# Patient Record
Sex: MALE | Race: WHITE | NOT HISPANIC OR LATINO
[De-identification: names, ages, dates, MRNs, and addresses within clinical notes are randomized per-mention and may not be internally consistent; named-entity substitution may affect disease eponyms.]

---

## 2017-09-07 PROBLEM — Z00.00 ENCOUNTER FOR PREVENTIVE HEALTH EXAMINATION: Status: ACTIVE | Noted: 2017-09-07

## 2017-09-25 ENCOUNTER — APPOINTMENT (OUTPATIENT)
Dept: VASCULAR SURGERY | Facility: CLINIC | Age: 57
End: 2017-09-25
Payer: MEDICAID

## 2017-09-25 VITALS
HEIGHT: 68 IN | DIASTOLIC BLOOD PRESSURE: 94 MMHG | HEART RATE: 106 BPM | BODY MASS INDEX: 43.19 KG/M2 | TEMPERATURE: 98.4 F | SYSTOLIC BLOOD PRESSURE: 143 MMHG | WEIGHT: 285 LBS

## 2017-09-25 DIAGNOSIS — I83.90 ASYMPTOMATIC VARICOSE VEINS OF UNSPECIFIED LOWER EXTREMITY: ICD-10-CM

## 2017-09-25 DIAGNOSIS — M25.472 EFFUSION, RIGHT ANKLE: ICD-10-CM

## 2017-09-25 DIAGNOSIS — Z86.39 PERSONAL HISTORY OF OTHER ENDOCRINE, NUTRITIONAL AND METABOLIC DISEASE: ICD-10-CM

## 2017-09-25 DIAGNOSIS — F17.200 NICOTINE DEPENDENCE, UNSPECIFIED, UNCOMPLICATED: ICD-10-CM

## 2017-09-25 DIAGNOSIS — E03.9 HYPOTHYROIDISM, UNSPECIFIED: ICD-10-CM

## 2017-09-25 DIAGNOSIS — L03.116 CELLULITIS OF LEFT LOWER LIMB: ICD-10-CM

## 2017-09-25 DIAGNOSIS — M25.471 EFFUSION, RIGHT ANKLE: ICD-10-CM

## 2017-09-25 PROCEDURE — 99204 OFFICE O/P NEW MOD 45 MIN: CPT | Mod: 25

## 2017-09-25 RX ORDER — LEVOTHYROXINE SODIUM 137 UG/1
TABLET ORAL
Refills: 0 | Status: ACTIVE | COMMUNITY

## 2017-09-25 RX ORDER — DIVALPROEX SODIUM 500 MG/1
500 TABLET, DELAYED RELEASE ORAL
Refills: 0 | Status: ACTIVE | COMMUNITY

## 2017-09-25 RX ORDER — SIMVASTATIN 40 MG/1
40 TABLET, FILM COATED ORAL
Refills: 0 | Status: ACTIVE | COMMUNITY

## 2017-09-25 RX ORDER — OMEPRAZOLE 40 MG/1
40 CAPSULE, DELAYED RELEASE ORAL
Refills: 0 | Status: ACTIVE | COMMUNITY

## 2017-09-25 RX ORDER — CALCIUM CARBONATE/VITAMIN D3 600 MG-10
TABLET ORAL
Refills: 0 | Status: ACTIVE | COMMUNITY

## 2017-09-25 RX ORDER — RISPERIDONE 2 MG/1
2 TABLET ORAL
Refills: 0 | Status: ACTIVE | COMMUNITY

## 2017-09-25 RX ORDER — CEPHALEXIN 500 MG/1
500 CAPSULE ORAL
Qty: 20 | Refills: 0 | Status: ACTIVE | COMMUNITY
Start: 2017-09-25 | End: 1900-01-01

## 2017-09-25 RX ORDER — BUSPIRONE HYDROCHLORIDE 10 MG/1
10 TABLET ORAL
Refills: 0 | Status: ACTIVE | COMMUNITY

## 2017-09-25 RX ORDER — FOLIC ACID 1 MG/1
1 TABLET ORAL
Refills: 0 | Status: ACTIVE | COMMUNITY

## 2017-10-23 ENCOUNTER — APPOINTMENT (OUTPATIENT)
Dept: VASCULAR SURGERY | Facility: CLINIC | Age: 57
End: 2017-10-23

## 2019-05-27 ENCOUNTER — INPATIENT (INPATIENT)
Facility: HOSPITAL | Age: 59
LOS: 1 days | Discharge: HOME | End: 2019-05-29
Attending: HOSPITALIST | Admitting: HOSPITALIST
Payer: MEDICAID

## 2019-05-27 VITALS
HEART RATE: 85 BPM | RESPIRATION RATE: 18 BRPM | DIASTOLIC BLOOD PRESSURE: 56 MMHG | OXYGEN SATURATION: 97 % | SYSTOLIC BLOOD PRESSURE: 114 MMHG | TEMPERATURE: 99 F

## 2019-05-27 DIAGNOSIS — F20.9 SCHIZOPHRENIA, UNSPECIFIED: ICD-10-CM

## 2019-05-27 DIAGNOSIS — R41.0 DISORIENTATION, UNSPECIFIED: ICD-10-CM

## 2019-05-27 DIAGNOSIS — N18.9 CHRONIC KIDNEY DISEASE, UNSPECIFIED: ICD-10-CM

## 2019-05-27 DIAGNOSIS — R09.89 OTHER SPECIFIED SYMPTOMS AND SIGNS INVOLVING THE CIRCULATORY AND RESPIRATORY SYSTEMS: ICD-10-CM

## 2019-05-27 DIAGNOSIS — E03.9 HYPOTHYROIDISM, UNSPECIFIED: ICD-10-CM

## 2019-05-27 DIAGNOSIS — R60.0 LOCALIZED EDEMA: ICD-10-CM

## 2019-05-27 DIAGNOSIS — F05 DELIRIUM DUE TO KNOWN PHYSIOLOGICAL CONDITION: ICD-10-CM

## 2019-05-27 DIAGNOSIS — N40.0 BENIGN PROSTATIC HYPERPLASIA WITHOUT LOWER URINARY TRACT SYMPTOMS: ICD-10-CM

## 2019-05-27 DIAGNOSIS — Z29.9 ENCOUNTER FOR PROPHYLACTIC MEASURES, UNSPECIFIED: ICD-10-CM

## 2019-05-27 DIAGNOSIS — D64.9 ANEMIA, UNSPECIFIED: ICD-10-CM

## 2019-05-27 LAB
ALBUMIN SERPL ELPH-MCNC: 3.3 G/DL — SIGNIFICANT CHANGE UP (ref 3.3–5)
ALP SERPL-CCNC: 104 U/L — SIGNIFICANT CHANGE UP (ref 40–120)
ALT FLD-CCNC: 7 U/L — SIGNIFICANT CHANGE UP (ref 4–41)
AMPHET UR-MCNC: NEGATIVE — SIGNIFICANT CHANGE UP
ANION GAP SERPL CALC-SCNC: 14 MMO/L — SIGNIFICANT CHANGE UP (ref 7–14)
APAP SERPL-MCNC: < 15 UG/ML — LOW (ref 15–25)
APPEARANCE UR: CLEAR — SIGNIFICANT CHANGE UP
AST SERPL-CCNC: 10 U/L — SIGNIFICANT CHANGE UP (ref 4–40)
BACTERIA # UR AUTO: SIGNIFICANT CHANGE UP
BARBITURATES UR SCN-MCNC: NEGATIVE — SIGNIFICANT CHANGE UP
BASOPHILS # BLD AUTO: 0.08 K/UL — SIGNIFICANT CHANGE UP (ref 0–0.2)
BASOPHILS NFR BLD AUTO: 0.9 % — SIGNIFICANT CHANGE UP (ref 0–2)
BENZODIAZ UR-MCNC: NEGATIVE — SIGNIFICANT CHANGE UP
BILIRUB SERPL-MCNC: < 0.2 MG/DL — LOW (ref 0.2–1.2)
BILIRUB UR-MCNC: NEGATIVE — SIGNIFICANT CHANGE UP
BLOOD UR QL VISUAL: NEGATIVE — SIGNIFICANT CHANGE UP
BUN SERPL-MCNC: 29 MG/DL — HIGH (ref 7–23)
CALCIUM SERPL-MCNC: 8.7 MG/DL — SIGNIFICANT CHANGE UP (ref 8.4–10.5)
CANNABINOIDS UR-MCNC: NEGATIVE — SIGNIFICANT CHANGE UP
CHLORIDE SERPL-SCNC: 107 MMOL/L — SIGNIFICANT CHANGE UP (ref 98–107)
CHLORIDE UR-SCNC: 53 MMOL/L — SIGNIFICANT CHANGE UP
CO2 SERPL-SCNC: 20 MMOL/L — LOW (ref 22–31)
COCAINE METAB.OTHER UR-MCNC: NEGATIVE — SIGNIFICANT CHANGE UP
COLOR SPEC: COLORLESS — SIGNIFICANT CHANGE UP
CREAT ?TM UR-MCNC: 24.6 MG/DL — SIGNIFICANT CHANGE UP
CREAT SERPL-MCNC: 3.26 MG/DL — HIGH (ref 0.5–1.3)
EOSINOPHIL # BLD AUTO: 0.33 K/UL — SIGNIFICANT CHANGE UP (ref 0–0.5)
EOSINOPHIL NFR BLD AUTO: 3.8 % — SIGNIFICANT CHANGE UP (ref 0–6)
ETHANOL BLD-MCNC: < 10 MG/DL — SIGNIFICANT CHANGE UP
GLUCOSE SERPL-MCNC: 102 MG/DL — HIGH (ref 70–99)
GLUCOSE UR-MCNC: NEGATIVE — SIGNIFICANT CHANGE UP
HCT VFR BLD CALC: 27.9 % — LOW (ref 39–50)
HGB BLD-MCNC: 8.9 G/DL — LOW (ref 13–17)
HYALINE CASTS # UR AUTO: NEGATIVE — SIGNIFICANT CHANGE UP
IMM GRANULOCYTES NFR BLD AUTO: 0.5 % — SIGNIFICANT CHANGE UP (ref 0–1.5)
KETONES UR-MCNC: NEGATIVE — SIGNIFICANT CHANGE UP
LEUKOCYTE ESTERASE UR-ACNC: SIGNIFICANT CHANGE UP
LYMPHOCYTES # BLD AUTO: 1.41 K/UL — SIGNIFICANT CHANGE UP (ref 1–3.3)
LYMPHOCYTES # BLD AUTO: 16.1 % — SIGNIFICANT CHANGE UP (ref 13–44)
MCHC RBC-ENTMCNC: 30.7 PG — SIGNIFICANT CHANGE UP (ref 27–34)
MCHC RBC-ENTMCNC: 31.9 % — LOW (ref 32–36)
MCV RBC AUTO: 96.2 FL — SIGNIFICANT CHANGE UP (ref 80–100)
METHADONE UR-MCNC: NEGATIVE — SIGNIFICANT CHANGE UP
MONOCYTES # BLD AUTO: 0.62 K/UL — SIGNIFICANT CHANGE UP (ref 0–0.9)
MONOCYTES NFR BLD AUTO: 7.1 % — SIGNIFICANT CHANGE UP (ref 2–14)
NEUTROPHILS # BLD AUTO: 6.29 K/UL — SIGNIFICANT CHANGE UP (ref 1.8–7.4)
NEUTROPHILS NFR BLD AUTO: 71.6 % — SIGNIFICANT CHANGE UP (ref 43–77)
NITRITE UR-MCNC: NEGATIVE — SIGNIFICANT CHANGE UP
NRBC # FLD: 0 K/UL — SIGNIFICANT CHANGE UP (ref 0–0)
NT-PROBNP SERPL-SCNC: 4364 PG/ML — SIGNIFICANT CHANGE UP
OPIATES UR-MCNC: NEGATIVE — SIGNIFICANT CHANGE UP
OXYCODONE UR-MCNC: NEGATIVE — SIGNIFICANT CHANGE UP
PCP UR-MCNC: NEGATIVE — SIGNIFICANT CHANGE UP
PH UR: 6.5 — SIGNIFICANT CHANGE UP (ref 5–8)
PLATELET # BLD AUTO: 383 K/UL — SIGNIFICANT CHANGE UP (ref 150–400)
PMV BLD: 9 FL — SIGNIFICANT CHANGE UP (ref 7–13)
POTASSIUM SERPL-MCNC: 4.1 MMOL/L — SIGNIFICANT CHANGE UP (ref 3.5–5.3)
POTASSIUM SERPL-SCNC: 4.1 MMOL/L — SIGNIFICANT CHANGE UP (ref 3.5–5.3)
POTASSIUM UR-SCNC: 9.5 MMOL/L — SIGNIFICANT CHANGE UP
PROT SERPL-MCNC: 6.6 G/DL — SIGNIFICANT CHANGE UP (ref 6–8.3)
PROT UR-MCNC: NEGATIVE — SIGNIFICANT CHANGE UP
RBC # BLD: 2.9 M/UL — LOW (ref 4.2–5.8)
RBC # FLD: 14 % — SIGNIFICANT CHANGE UP (ref 10.3–14.5)
RBC CASTS # UR COMP ASSIST: SIGNIFICANT CHANGE UP (ref 0–?)
SALICYLATES SERPL-MCNC: < 5 MG/DL — LOW (ref 15–30)
SODIUM SERPL-SCNC: 141 MMOL/L — SIGNIFICANT CHANGE UP (ref 135–145)
SODIUM UR-SCNC: 60 MMOL/L — SIGNIFICANT CHANGE UP
SP GR SPEC: 1.01 — SIGNIFICANT CHANGE UP (ref 1–1.04)
SQUAMOUS # UR AUTO: SIGNIFICANT CHANGE UP
TSH SERPL-MCNC: 8.77 UIU/ML — HIGH (ref 0.27–4.2)
UROBILINOGEN FLD QL: NORMAL — SIGNIFICANT CHANGE UP
VALPROATE SERPL-MCNC: 17.7 UG/ML — LOW (ref 50–100)
WBC # BLD: 8.77 K/UL — SIGNIFICANT CHANGE UP (ref 3.8–10.5)
WBC # FLD AUTO: 8.77 K/UL — SIGNIFICANT CHANGE UP (ref 3.8–10.5)
WBC UR QL: HIGH (ref 0–?)

## 2019-05-27 PROCEDURE — 90792 PSYCH DIAG EVAL W/MED SRVCS: CPT | Mod: GC

## 2019-05-27 PROCEDURE — 99223 1ST HOSP IP/OBS HIGH 75: CPT | Mod: GC

## 2019-05-27 RX ORDER — RISPERIDONE 4 MG/1
2 TABLET ORAL AT BEDTIME
Refills: 0 | Status: DISCONTINUED | OUTPATIENT
Start: 2019-05-27 | End: 2019-05-29

## 2019-05-27 RX ORDER — SIMVASTATIN 20 MG/1
40 TABLET, FILM COATED ORAL AT BEDTIME
Refills: 0 | Status: DISCONTINUED | OUTPATIENT
Start: 2019-05-27 | End: 2019-05-29

## 2019-05-27 RX ORDER — TRAZODONE HCL 50 MG
50 TABLET ORAL AT BEDTIME
Refills: 0 | Status: DISCONTINUED | OUTPATIENT
Start: 2019-05-27 | End: 2019-05-29

## 2019-05-27 RX ORDER — DIVALPROEX SODIUM 500 MG/1
250 TABLET, DELAYED RELEASE ORAL AT BEDTIME
Refills: 0 | Status: DISCONTINUED | OUTPATIENT
Start: 2019-05-27 | End: 2019-05-28

## 2019-05-27 RX ORDER — HEPARIN SODIUM 5000 [USP'U]/ML
5000 INJECTION INTRAVENOUS; SUBCUTANEOUS EVERY 8 HOURS
Refills: 0 | Status: DISCONTINUED | OUTPATIENT
Start: 2019-05-27 | End: 2019-05-29

## 2019-05-27 RX ORDER — TAMSULOSIN HYDROCHLORIDE 0.4 MG/1
0.4 CAPSULE ORAL AT BEDTIME
Refills: 0 | Status: DISCONTINUED | OUTPATIENT
Start: 2019-05-27 | End: 2019-05-29

## 2019-05-27 RX ORDER — LEVOTHYROXINE SODIUM 125 MCG
88 TABLET ORAL DAILY
Refills: 0 | Status: DISCONTINUED | OUTPATIENT
Start: 2019-05-27 | End: 2019-05-29

## 2019-05-27 RX ADMIN — HEPARIN SODIUM 5000 UNIT(S): 5000 INJECTION INTRAVENOUS; SUBCUTANEOUS at 21:37

## 2019-05-27 RX ADMIN — DIVALPROEX SODIUM 250 MILLIGRAM(S): 500 TABLET, DELAYED RELEASE ORAL at 21:37

## 2019-05-27 RX ADMIN — Medication 50 MILLIGRAM(S): at 21:37

## 2019-05-27 RX ADMIN — TAMSULOSIN HYDROCHLORIDE 0.4 MILLIGRAM(S): 0.4 CAPSULE ORAL at 21:37

## 2019-05-27 RX ADMIN — SIMVASTATIN 40 MILLIGRAM(S): 20 TABLET, FILM COATED ORAL at 21:37

## 2019-05-27 RX ADMIN — RISPERIDONE 2 MILLIGRAM(S): 4 TABLET ORAL at 21:37

## 2019-05-27 NOTE — H&P ADULT - PROBLEM SELECTOR PLAN 1
Psych hx: schizophrenia/schizoaffective disorder (30+ hospitalization, last 2018 at [place]), brought in by brother for concern of functional decline in setting of medication non-adherence. Hx 4 suicide attempts (last 20 yrs ago).  - Appreciate psych recs  - Valproic acid level subtherapeutic at 17.7  - C/w home meds including: divalproex sodium 1000 daily, resperidone 2 mg daily  buspirone 10 mg BID, bupropion 75 mg BID, trazodone 50 mg daily for now Psych hx: schizophrenia/schizoaffective disorder (30+ hospitalization, last 2018 at [place]), brought in by brother for concern of functional decline in setting of medication non-adherence. Hx 4 suicide attempts (last 20 yrs ago).  - psych consult reviewed  - Valproic acid level subtherapeutic at 17.7  - C/w home meds including: divalproex sodium 1000 daily, resperidone 2 mg daily  buspirone 10 mg BID, bupropion 75 mg BID, trazodone 50 mg daily for now

## 2019-05-27 NOTE — H&P ADULT - PROBLEM SELECTOR PLAN 3
Scr noted at 3.26 in ED, likely chronic. No baseline Cr in [facility] however Scr 3.89 1 week PTA per [facility] record supplied by brother  Pt and brother endorse long history of "kidney problem"  - FeNa 5.8 suggestive of post-renal/obstructive etiology  - F/u kidney ultrasound   - C/w tamsulosin as below Scr noted at 3.26 in ED, likely chronic. No baseline Cr in [facility] however Scr 3.89 1 week PTA per [facility] record supplied by brother  Pt and brother endorse long history of "kidney problem"  - FeNa 5.8 suggestive of post-renal/obstructive etiology  - F/u kidney ultrasound   - C/w tamsulosin as below

## 2019-05-27 NOTE — ED PROVIDER NOTE - SKIN COLOR
B/L LE lymphadema appearance, lichen planus, stasus dermatitis, nontender erytheam along midline thighs

## 2019-05-27 NOTE — H&P ADULT - PROBLEM SELECTOR PLAN 2
Normocytic anemia. Hgb 8.9 in ED.   Likely chronic 2/2 renal disease. No b/l in [facility].  Hgb 9.4 1 week PTA per [place] record supplied by brother. Per pt EGD at [facility] last week negative for bleed "totally normal".  Pt denies hematuria or BRBPR.    - F/u reticulocyte count/index  - F/u iron studies   - F/u B12/thiamine Normocytic anemia. Hgb 8.9 in ED.   Likely chronic 2/2 renal disease. No b/l in [facility].  Hgb 9.4 1 week PTA per [facility] record supplied by brother. Per pt EGD at [facility] last week negative for bleed "totally normal".  Pt denies hematuria or BRBPR.    - F/u reticulocyte count/index  - F/u iron studies   - F/u B12/thiamine

## 2019-05-27 NOTE — H&P ADULT - PROBLEM SELECTOR PLAN 5
C/w tamsulosin 0.4 mg daily TSH elevated at 8.7 (possibly in setting poor medication adherence)   Will f/u T4 in AM  C/w home levothyroxine 88 mcg for now

## 2019-05-27 NOTE — ED BEHAVIORAL HEALTH ASSESSMENT NOTE - OTHER
cooperative, but appears to be minimizing, unclear if this is intent or his actual beliefs lying in bed Germán alavrado. brother

## 2019-05-27 NOTE — H&P ADULT - HISTORY OF PRESENT ILLNESS
60 yo man w/ ?vascualr disease, psych hx: schizophrenia/schizoaffective disorder (30+ hospitalization, last 2018 at [place]), brought in by brother for concern of functional decline in setting of medication non-adherence. Pt lives alone, disabled by schizophrenia, HHA 4d/wk, visiting nurse 2x/mo, hx of 4 suicide attempts (last 20 yrs ago).  Pt's brother concerned of functional decline, states pt not adhering to medication, apartment noted with lit cigarettes on the floor, pt not showering/ tending to hygiene, reduced sleep, increased anxiety and conceptual disorganization. 60 yo man w/ ?vascualr disease, psych hx: schizophrenia/schizoaffective disorder (30+ hospitalization, last 2018 at [place]), brought in by brother for concern of functional decline in setting of medication non-adherence. Pt lives alone, disabled by schizophrenia, HHA 4d/wk, visiting nurse 2x/mo, hx of 4 suicide attempts (last 20 yrs ago).  Pt's brother concerned of functional decline, states pt not adhering to medication, apartment noted with lit cigarettes on the floor, pt not showering/ tending to hygiene, reduced sleep, increased anxiety and conceptual disorganization.     ED VS, afebrile, HR 70s-80s, -114/56-79, O2sat 100% on RA  No medications administered in ED 58 yo man w/ ?vascualr disease, psych hx: schizophrenia/schizoaffective disorder (30+ hospitalization), brought in by brother for concern of functional decline in setting of medication non-adherence. Pt lives alone, disabled by schizophrenia, HHA 4d/wk, visiting nurse 2x/mo, hx of 4 suicide attempts (last 20 yrs ago).  Pt's brother concerned of functional decline, states pt not adhering to medication, apartment noted with lit cigarettes on the floor, pt not showering/ tending to hygiene, reduced sleep, increased anxiety and conceptual disorganization. Pt and brother endorse long history of kidney disfunction (Cr 3.89 at [facility] 1 week PTA). Pt states he had an EGD at [facility] last week that was unremarkable.  He states that he was found to have clots in his lower extremities last week but was not sent home on blood thinners.  Pt denies light headedness, recent fall, chest pain, SOB, abd pain, N/V/D.     ED VS, afebrile, HR 70s-80s, -114/56-79, O2sat 100% on RA  Labs notable for Scr 3.26, Hgb 9.4, valproic acid level subtherapeutic at 17.7, TSH elevated at 8.7  No medications administered in ED

## 2019-05-27 NOTE — H&P ADULT - NSHPPHYSICALEXAM_GEN_ALL_CORE
Vital Signs Last 24 Hrs  T(C): 36.7 (27 May 2019 16:45), Max: 37.1 (27 May 2019 13:09)  T(F): 98 (27 May 2019 16:45), Max: 98.7 (27 May 2019 13:09)  HR: 74 (27 May 2019 16:45) (74 - 85)  BP: 104/61 (27 May 2019 16:45) (104/61 - 134/79)  RR: 18 (27 May 2019 16:45) (18 - 18)  SpO2: 96% (27 May 2019 16:45) (96% - 100%)    GENERAL: NAD, sitting up in stretcher  HEENT: PERRL, EOMI, MMM, no oropharyngeal lesions or erythema appreciated  Pulm: normal work of breathing, CTAB  CV: RRR, S1&S2+, no m/r/g appreciated  ABDOMEN: +BS, soft, obese, slight distention, nt  MSK: nl ROM  EXTREMITIES: non-pitting AURELIA with erythematous/brown pigmented scaly lichenified plaques involving the distal lower leg. scattered woody induration w/ apearance of lipodermatosclerosis.  unable to palpate DP b/l, warm lower extremities.  palpable pulses b/l UE.    Neuro: A&Ox3, no focal deficits  SKIN: warm and dry, rash as above

## 2019-05-27 NOTE — ED BEHAVIORAL HEALTH ASSESSMENT NOTE - DESCRIPTION
DVT? in past, hx of vascular disease, Kidney disease? lives alone, never , no dependents Calm and cooperative in ED, seen on medical side, patient minimizing, unreliable historian.     Vital Signs Last 24 Hrs  T(C): 36.7 (27 May 2019 16:45), Max: 37.1 (27 May 2019 13:09)  T(F): 98 (27 May 2019 16:45), Max: 98.7 (27 May 2019 13:09)  HR: 74 (27 May 2019 16:45) (74 - 85)  BP: 104/61 (27 May 2019 16:45) (104/61 - 134/79)  BP(mean): --  RR: 18 (27 May 2019 16:45) (18 - 18)  SpO2: 96% (27 May 2019 16:45) (96% - 100%)

## 2019-05-27 NOTE — ED ADULT TRIAGE NOTE - CHIEF COMPLAINT QUOTE
Pt unable to care for self.  Disoriented.  Non compliant with meds.  PMH bipolar disorder, schizophrenia.  Pt states he forgets to take his meds.  A+OX3.  Denies any recent illnesses.

## 2019-05-27 NOTE — H&P ADULT - PROBLEM SELECTOR PLAN 4
TSH elevated at 8.7 (possibly in setting poor medication adherence)   Will f/u T4 in AM  C/w home levothyroxine 88 mcg for now Tense non-pitting lower extremity edema and rash suggestive of statis dermatitis.  Pt notes hx of lower extremity clot last week at [facility] but states he was not sent home on anticoagulation. Denies hx of PE. No SOB.   - F/u lower extremity dopplers to r/o DVT

## 2019-05-27 NOTE — ED PROVIDER NOTE - ATTENDING CONTRIBUTION TO CARE
Dr. Cottrell: I have personally seen and examined this patient at the bedside. I have fully participated in the care of this patient. I have reviewed all pertinent clinical information, including history, physical exam, plan and the Resident's note and agree except as noted. HPI above as by me. PE above as by me. Disorientation setting of medication non compliance no si/hi no harm to self or others PLAN ekg, labs, psych eval, suspect patient requires higher level of care than assisted living.

## 2019-05-27 NOTE — ED BEHAVIORAL HEALTH ASSESSMENT NOTE - DETAILS
hx of 4 SA, 3 ODs and 1 cutting wrists, all over 20 years ago. was toxic on Lithium in remote past, unclear details. Mother with "manic depressive illness" dementia nausea, vomiting signed out on [facility] care is coordinated with brother and EM provider

## 2019-05-27 NOTE — ED ADULT NURSE NOTE - OBJECTIVE STATEMENT
PT received into room 2 A&Ox3, ambulatory with assistance brought in by family for increased confusion, increased difficulties with ADL's and non- compliance with medications x 2 weeks. Pt C/O dizziness and nausea x 1 day. PT has PMH: Schizophrenia, Bipolar, family states PT has not been taking medications for approximately 2 weeks, assisted living apt was in disarray, has had episodes of confusion previously and usually associated to non- compliance with meds. PT currently A&Ox3, has some inconsistency with HPI; Pt has B/L swollen extremities, B/L LE redness; dry cracked skin secondary to Lymphedema and venous stasis, has redness and moisture associated excoriation to lower ABD. PT calm and cooperative on assessment. Denies SI/HI, AH/VH, ETOH/ drug use. MD evaluated, VS as noted. 22 G IV placed to R ARM, labs sent. Family at bedside, call bell within reach, will continue to monitor for safety and comfort.

## 2019-05-27 NOTE — ED PROVIDER NOTE - OBJECTIVE STATEMENT
14:13 Simba att: 59M h/o bipolar disorder and schizophrenia brought in by brother for psych eval. Patient reports he is here for nausea, confusion and dizzines. Brother reports in private patient lives in [facility], when he takes his meds he is good. Early this month 9 day medical admission [place], no psych issues. Past 2 weeks not taking depakote, risperidal, buspirone, trazodone and now he stays up all night, house in disarray, and gradually not answering questions. Brother took him 3 times to [facility] to get him psychiatrically admitted, came to [facility] today at a 's recommendation. While walking her patient told his brother he felt lightheaded. 14:13 Simba att: 59M h/o bipolar disorder and schizophrenia brought in by brother for psych eval. Patient reports he is here for nausea, confusion and dizzines. Brother reports in private patient lives in [facility], when he takes his meds he is good. Early this month 9 day medical admission [place], no psych issues. Past 2 weeks not taking Depakote, Risperdal, buspirone, trazodone and now he stays up all night, house in disarray, and gradually not answering questions. Brother took him 3 times to [facility] to get him psychiatrically admitted, came to [facility] today at a 's recommendation. While walking her patient told his brother he felt lightheaded.

## 2019-05-27 NOTE — ED BEHAVIORAL HEALTH ASSESSMENT NOTE - CASE SUMMARY
58yo M, single, living alone, disabled for schizophrenia, psych hx of schizophrenia/schizoaffective disorder, hx of over 30 hospitalizations, most recently in 2018 at [place], hx of 4 SA, 3 by OD, cut wrists once, all over 20 years ago, lives alone with assistance from [employer] has HHA 4 days/week, visiting nurse 2x/month, medical hx of vascular disease, hx of likely DVT remote, was recently medically admitted to [facility] for 9 days earlier this month for "medical reasons," went to [place] 3x this week was seen in ED and discharged each time, while patient is minimizing of all symptoms, brother Germán, present at bedside notes patient has been med non-compliant as he counted his meds, notes he has been going for walks around town at 3-4am at night, his apartment is in disarray, with lit cigarettes on the floor, patient is not showering or tending to hygiene, reduced sleep, increased anxiety and conceptual disorganization. Patient with apparent abrupt functional decline and waxing and waning sensorium, delirium vs recurrent psychosis 2/2 med non-compliance, current metabolic derangements include anemia and elevated creatinine and BUN, unclear chronicity. Patient to be medically admitted and [facility] psychiatry to follow.

## 2019-05-27 NOTE — ED PROVIDER NOTE - CLINICAL SUMMARY MEDICAL DECISION MAKING FREE TEXT BOX
Disorientation setting of medication non compliance no si/hi no harm to self or others PLAN ekg, labs, psych eval, suspect patient requires higher level of care than assisted living

## 2019-05-27 NOTE — ED BEHAVIORAL HEALTH ASSESSMENT NOTE - SUMMARY
58yo M, single, living alone, disabled for schizophrenia, psych hx of schizophrenia/schizoaffective disorder, hx of over 30 hospitalizations, most recently in 2018 at [place], hx of 4 SA, 3 by OD, cut wrists once, all over 20 years ago, lives alone with assistance from [employer] has HHA 4 days/week, visiting nurse 2x/month, medical hx of vascular disease, hx of likely DVT remote, was recently medically admitted to [facility] for 9 days earlier this month for "medical reasons," went to [place] 3x this week was seen in ED and discharged each time, while patient is minimizing of all symptoms, brother Germán, present at bedside notes patient has been med non-compliant as he counted his meds, notes he has been going for walks around town at 3-4am at night, his apartment is in disarray, with lit cigarettes on the floor, patient is not showering or tending to hygiene, reduced sleep, increased anxiety and conceptual disorganization. Patient with apparent abrupt functional decline and waxing and waning sensorium, delirium vs recurrent psychosis 2/2 med non-compliance, current metabolic derangements include anemia and elevated creatinine and BUN, unclear chronicity. Family thinks patient is not safe to go home.

## 2019-05-27 NOTE — H&P ADULT - NSHPREVIEWOFSYSTEMS_GEN_ALL_CORE
REVIEW OF SYSTEMS:    CONSTITUTIONAL: No weakness, fevers or chills  EYES/ENT: No visual changes;  no throat pain   NECK: No pain or stiffness  RESPIRATORY: No cough, wheezing, hemoptysis; No shortness of breath  CARDIOVASCULAR: No chest pain or palpitations  GASTROINTESTINAL: No abdominal or epigastric pain. No nausea, vomiting, or hematemesis; No diarrhea or constipation. No melena or hematochezia.  GENITOURINARY: No dysuria, change in frequency or hematuria  NEUROLOGICAL: +headache, No numbness or weakness  SKIN: No itching, burning, rashes, or lesions   All other review of systems is negative unless indicated above.

## 2019-05-27 NOTE — ED ADULT NURSE NOTE - NSIMPLEMENTINTERV_GEN_ALL_ED
Implemented All Fall Risk Interventions:  [place] to call system. Call bell, personal items and telephone within reach. Instruct patient to call for assistance. Room bathroom lighting operational. Non-slip footwear when patient is off stretcher. Physically safe environment: no spills, clutter or unnecessary equipment. Stretcher in lowest position, wheels locked, appropriate side rails in place. Provide visual cue, wrist band, yellow gown, etc. Monitor gait and stability. Monitor for mental status changes and reorient to person, place, and time. Review medications for side effects contributing to fall risk. Reinforce activity limits and safety measures with patient and family.

## 2019-05-27 NOTE — ED PROVIDER NOTE - NEUROLOGICAL, MLM
Alert and oriented, no focal deficits, no motor or sensory deficits. CN 2-12 intact, neg pronator drift, 5/5 str throughout, sensation intact throughout, clear speech. Finger to nose intact. Nl rapid alt mvmt.

## 2019-05-27 NOTE — ED BEHAVIORAL HEALTH ASSESSMENT NOTE - HPI (INCLUDE ILLNESS QUALITY, SEVERITY, DURATION, TIMING, CONTEXT, MODIFYING FACTORS, ASSOCIATED SIGNS AND SYMPTOMS)
60yo M, single, living alone, disabled for schizophrenia, psych hx of schizophrenia/schizoaffective disorder, hx of over 30 hospitalizations, most recently in 2018 at [place], hx of 4 SA, 3 by OD, cut wrists once, all over 20 years ago, lives alone with assistance from [employer] has HHA 4 days/week, visiting nurse 2x/month, medical hx of vascular disease, hx of likely DVT remote, was recently medically admitted to [facility] for 9 days earlier this month for "medical reasons," went to [place] 3x this week was seen in ED and discharged each time, while patient is minimizing of all symptoms, brother Germán, present at bedside notes patient has been med non-compliant as he counted his meds, notes he has been going for walks around town at 3-4am at night, his apartment is in disarray, with lit cigarettes on the floor, patient is not showering or tending to hygiene, reduced sleep, increased anxiety and conceptual disorganization. Denies that brother has expressed any SI or behaviors concerning for suicidal tendencies, denies apparent depressed mood. Family is unable to account for chronicity of kidney disease, or anemia. Brother expresses concerns that patient is not appropriate for independent living as he has concerns that cigarettes on floor could lead to fire and that patient is unable to feed self or take appropriate care of himself in current set up. Notes that they have initiated process to obtain placement in assisted living facility with assistance from a SW outside, but she felt that current condition warrants higher level of care until patient is stabilized or higher level of care can be obtained.

## 2019-05-27 NOTE — ED PROVIDER NOTE - PROGRESS NOTE DETAILS
labwork from [facility] on 5/24 with hb 9.4, Cr 3.89  will adm it to hospitalist for psych admission to [facility] and mikael workup of anemia and elevated cr

## 2019-05-27 NOTE — H&P ADULT - ATTENDING COMMENTS
Pt with history of schizophrenia brought to ED by brother 2/2 poor self care.  Pt reports recent admission to [place] where he was treated for blood clots in the legs, but states he had to walk 1 mile home from the hospital at 4 AM.  Reports he took a sleeping pill, and slept for 2 days and therefore missed his medications. Denies AVH, or SI.    On exam: pt in NAD.  Lungs CTAB.  Abd s/nt/nd normal bs.  BLE with marked edema and hyperpigmentation.    Labs and imaging reviewed  1) schizophrenia  - continue home medications  - f/u C/L psych  2) anemia  - likely chronic, no signs of bleeding  3) CKD: Cr improved from level from recent admission provided by brother

## 2019-05-27 NOTE — H&P ADULT - NSHPLABSRESULTS_GEN_ALL_CORE
8.9    8.77  )-----------( 383      ( 27 May 2019 14:20 )             27.9           141  |  107  |  29<H>  ----------------------------<  102<H>  4.1   |  20<L>  |  3.26<H>    Ca    8.7      27 May 2019 14:20    TPro  6.6  /  Alb  3.3  /  TBili  < 0.2<L>  /  DBili  x   /  AST  10  /  ALT  7   /  AlkPhos  104        Urinalysis Basic - ( 27 May 2019 18:35 )    Color: COLORLESS / Appearance: CLEAR / S.006 / pH: 6.5  Gluc: NEGATIVE / Ketone: NEGATIVE  / Bili: NEGATIVE / Urobili: NORMAL   Blood: NEGATIVE / Protein: NEGATIVE / Nitrite: NEGATIVE   Leuk Esterase: MODERATE / RBC: 0-2 / WBC 11-25   Sq Epi: OCC / Non Sq Epi: x / Bacteria: FEW      EKG:   NSR at 74 w/ bifascicular block (RBBB and LAHB) 8.9    8.77  )-----------( 383      ( 27 May 2019 14:20 )             27.9           141  |  107  |  29<H>  ----------------------------<  102<H>  4.1   |  20<L>  |  3.26<H>    Ca    8.7      27 May 2019 14:20    TPro  6.6  /  Alb  3.3  /  TBili  < 0.2<L>  /  DBili  x   /  AST  10  /  ALT  7   /  AlkPhos  104        Urinalysis Basic - ( 27 May 2019 18:35 )    Color: COLORLESS / Appearance: CLEAR / S.006 / pH: 6.5  Gluc: NEGATIVE / Ketone: NEGATIVE  / Bili: NEGATIVE / Urobili: NORMAL   Blood: NEGATIVE / Protein: NEGATIVE / Nitrite: NEGATIVE   Leuk Esterase: MODERATE / RBC: 0-2 / WBC 11-25   Sq Epi: OCC / Non Sq Epi: x / Bacteria: FEW    UTox negative  Acetaminophen level <15  Salicylate level <5  Ethanol level < 10  Valproic Acid level 17.7 (low)       EKG:   NSR at 74 w/ bifascicular block (RBBB and LAHB)

## 2019-05-27 NOTE — ED BEHAVIORAL HEALTH ASSESSMENT NOTE - RISK ASSESSMENT
Patient poses low acute suicide or aggression risk, but has demonstrated an inability to care for self in recent weeks, evidenced by putting out cigarettes on floor, not able to self administer meds consistently, unable to make meals adequately. While patient has static risk factors for suicide including hx of multiple attempts, hospitalizations, and med non-compliance, the patient is not currently suicidal has not been aggressive and there is no indication that patient is depressed.

## 2019-05-27 NOTE — H&P ADULT - NSHPSOCIALHISTORY_GEN_ALL_CORE
Lives alone, disabled by schizophrenia, HHA 4d/wk, visiting nurse 2x/mo  Retired "messenger" x 30 yrs   Ambulates with walker at home, states unlimited exercise tolerance     Tobacco: 47pk yr hx, current smoker  EtOH: never  Other drug use: never

## 2019-05-27 NOTE — H&P ADULT - ASSESSMENT
60 yo man w/ hypothyroidism, ?vascualr disease, schizophrenia/schizoaffective disorder (30+ hospitalization, last 2018 at [place]), brought in by brother for concern of functional decline in setting of medication non-adherence. Pt noted with anemia and elevated Cr (likely chronic), w/u pending.

## 2019-05-28 DIAGNOSIS — F25.0 SCHIZOAFFECTIVE DISORDER, BIPOLAR TYPE: ICD-10-CM

## 2019-05-28 LAB
ANION GAP SERPL CALC-SCNC: 16 MMO/L — HIGH (ref 7–14)
BUN SERPL-MCNC: 27 MG/DL — HIGH (ref 7–23)
CALCIUM SERPL-MCNC: 9.2 MG/DL — SIGNIFICANT CHANGE UP (ref 8.4–10.5)
CHLORIDE SERPL-SCNC: 107 MMOL/L — SIGNIFICANT CHANGE UP (ref 98–107)
CO2 SERPL-SCNC: 20 MMOL/L — LOW (ref 22–31)
CREAT SERPL-MCNC: 3.16 MG/DL — HIGH (ref 0.5–1.3)
FERRITIN SERPL-MCNC: 338.1 NG/ML — SIGNIFICANT CHANGE UP (ref 30–400)
FOLATE SERPL-MCNC: 11.6 NG/ML — SIGNIFICANT CHANGE UP (ref 4.7–20)
GLUCOSE SERPL-MCNC: 102 MG/DL — HIGH (ref 70–99)
HCT VFR BLD CALC: 29.7 % — LOW (ref 39–50)
HCV AB S/CO SERPL IA: 0.12 S/CO — SIGNIFICANT CHANGE UP (ref 0–0.99)
HCV AB SERPL-IMP: SIGNIFICANT CHANGE UP
HGB BLD-MCNC: 9.4 G/DL — LOW (ref 13–17)
IRON SATN MFR SERPL: 210 UG/DL — SIGNIFICANT CHANGE UP (ref 155–535)
IRON SATN MFR SERPL: 33 UG/DL — LOW (ref 45–165)
MAGNESIUM SERPL-MCNC: 2 MG/DL — SIGNIFICANT CHANGE UP (ref 1.6–2.6)
MCHC RBC-ENTMCNC: 30.8 PG — SIGNIFICANT CHANGE UP (ref 27–34)
MCHC RBC-ENTMCNC: 31.6 % — LOW (ref 32–36)
MCV RBC AUTO: 97.4 FL — SIGNIFICANT CHANGE UP (ref 80–100)
NRBC # FLD: 0 K/UL — SIGNIFICANT CHANGE UP (ref 0–0)
PHOSPHATE SERPL-MCNC: 3.5 MG/DL — SIGNIFICANT CHANGE UP (ref 2.5–4.5)
PLATELET # BLD AUTO: 388 K/UL — SIGNIFICANT CHANGE UP (ref 150–400)
PMV BLD: 8.9 FL — SIGNIFICANT CHANGE UP (ref 7–13)
POTASSIUM SERPL-MCNC: 4.4 MMOL/L — SIGNIFICANT CHANGE UP (ref 3.5–5.3)
POTASSIUM SERPL-SCNC: 4.4 MMOL/L — SIGNIFICANT CHANGE UP (ref 3.5–5.3)
RBC # BLD: 3.05 M/UL — LOW (ref 4.2–5.8)
RBC # FLD: 14.2 % — SIGNIFICANT CHANGE UP (ref 10.3–14.5)
RETICS #: 90 K/UL — SIGNIFICANT CHANGE UP (ref 25–125)
RETICS/RBC NFR: 3 % — HIGH (ref 0.5–2.5)
SODIUM SERPL-SCNC: 143 MMOL/L — SIGNIFICANT CHANGE UP (ref 135–145)
T4 FREE SERPL-MCNC: 1.13 NG/DL — SIGNIFICANT CHANGE UP (ref 0.9–1.8)
UIBC SERPL-MCNC: 177.4 UG/DL — SIGNIFICANT CHANGE UP (ref 110–370)
VIT B12 SERPL-MCNC: 390 PG/ML — SIGNIFICANT CHANGE UP (ref 200–900)
WBC # BLD: 8.62 K/UL — SIGNIFICANT CHANGE UP (ref 3.8–10.5)
WBC # FLD AUTO: 8.62 K/UL — SIGNIFICANT CHANGE UP (ref 3.8–10.5)

## 2019-05-28 PROCEDURE — 99232 SBSQ HOSP IP/OBS MODERATE 35: CPT | Mod: GC

## 2019-05-28 PROCEDURE — 93970 EXTREMITY STUDY: CPT | Mod: 26

## 2019-05-28 PROCEDURE — 99233 SBSQ HOSP IP/OBS HIGH 50: CPT

## 2019-05-28 RX ORDER — DIVALPROEX SODIUM 500 MG/1
500 TABLET, DELAYED RELEASE ORAL AT BEDTIME
Refills: 0 | Status: DISCONTINUED | OUTPATIENT
Start: 2019-05-28 | End: 2019-05-29

## 2019-05-28 RX ORDER — PANTOPRAZOLE SODIUM 20 MG/1
2 TABLET, DELAYED RELEASE ORAL
Qty: 0 | Refills: 0 | DISCHARGE

## 2019-05-28 RX ORDER — HALOPERIDOL DECANOATE 100 MG/ML
1 INJECTION INTRAMUSCULAR EVERY 6 HOURS
Refills: 0 | Status: DISCONTINUED | OUTPATIENT
Start: 2019-05-28 | End: 2019-05-29

## 2019-05-28 RX ADMIN — HEPARIN SODIUM 5000 UNIT(S): 5000 INJECTION INTRAVENOUS; SUBCUTANEOUS at 15:32

## 2019-05-28 RX ADMIN — Medication 50 MILLIGRAM(S): at 21:20

## 2019-05-28 RX ADMIN — DIVALPROEX SODIUM 500 MILLIGRAM(S): 500 TABLET, DELAYED RELEASE ORAL at 21:21

## 2019-05-28 RX ADMIN — RISPERIDONE 2 MILLIGRAM(S): 4 TABLET ORAL at 21:20

## 2019-05-28 RX ADMIN — SIMVASTATIN 40 MILLIGRAM(S): 20 TABLET, FILM COATED ORAL at 21:20

## 2019-05-28 RX ADMIN — Medication 10 MILLIGRAM(S): at 17:40

## 2019-05-28 RX ADMIN — HEPARIN SODIUM 5000 UNIT(S): 5000 INJECTION INTRAVENOUS; SUBCUTANEOUS at 05:44

## 2019-05-28 RX ADMIN — Medication 88 MICROGRAM(S): at 05:44

## 2019-05-28 RX ADMIN — Medication 10 MILLIGRAM(S): at 05:44

## 2019-05-28 RX ADMIN — TAMSULOSIN HYDROCHLORIDE 0.4 MILLIGRAM(S): 0.4 CAPSULE ORAL at 21:20

## 2019-05-28 RX ADMIN — HEPARIN SODIUM 5000 UNIT(S): 5000 INJECTION INTRAVENOUS; SUBCUTANEOUS at 21:20

## 2019-05-28 NOTE — PROGRESS NOTE BEHAVIORAL HEALTH - RISK ASSESSMENT
has chronic risks but is not in imminent risk to hurt self or others- older male, chronic schizophrenia, likely noncompliance, prior inpatient psych admissions, prior sa,   mood stable now, denies any si or hi, denies any a.vh or paranoia, is future oriented, able to state reasons to live

## 2019-05-28 NOTE — PROGRESS NOTE BEHAVIORAL HEALTH - NSBHATTESTSEENBY_PSY_A_CORE
avss  Feels well  abd soft, nontender  Drainage minimal  fistulagram tomorrow, perhaps remove drain   attending Psychiatrist without NP/Trainee

## 2019-05-28 NOTE — PROGRESS NOTE BEHAVIORAL HEALTH - NSBHCHARTREVIEWLAB_PSY_A_CORE FT
CBC Full  -  ( 28 May 2019 06:10 )  WBC Count : 8.62 K/uL  RBC Count : 3.05 M/uL  Hemoglobin : 9.4 g/dL  Hematocrit : 29.7 %  Platelet Count - Automated : 388 K/uL  Mean Cell Volume : 97.4 fL  Mean Cell Hemoglobin : 30.8 pg  Mean Cell Hemoglobin Concentration : 31.6 %  Auto Neutrophil # : x  Auto Lymphocyte # : x  Auto Monocyte # : x  Auto Eosinophil # : x  Auto Basophil # : x  Auto Neutrophil % : x  Auto Lymphocyte % : x  Auto Monocyte % : x  Auto Eosinophil % : x  Auto Basophil % : x  05-28    143  |  107  |  27<H>  ----------------------------<  102<H>  4.4   |  20<L>  |  3.16<H>    Ca    9.2      28 May 2019 06:10  Phos  3.5     05-28  Mg     2.0     05-28    TPro  6.6  /  Alb  3.3  /  TBili  < 0.2<L>  /  DBili  x   /  AST  10  /  ALT  7   /  AlkPhos  104  05-27

## 2019-05-28 NOTE — PROGRESS NOTE BEHAVIORAL HEALTH - SUMMARY
60yo M, single, living alone, disabled for schizophrenia, psych hx of schizophrenia/schizoaffective disorder, hx of over 30 hospitalizations, most recently in 2018 at [place], hx of 4 SA, 3 by OD, cut wrists once, all over 20 years ago, lives alone with assistance from [employer] has HHA 4 days/week, visiting nurse 2x/month, medical hx of vascular disease, hx of likely DVT remote, was recently medically admitted to [facility] for 9 days earlier this month for "medical reasons," went to [place] 3x this week was seen in ED and discharged each time, while patient is minimizing of all symptoms, brother Germán, present at bedside notes patient has been med non-compliant as he counted his meds, notes he has been going for walks around town at 3-4am at night, his apartment is in disarray, with lit cigarettes on the floor, patient is not showering or tending to hygiene, reduced sleep, increased anxiety and conceptual disorganization. Patient with apparent abrupt functional decline and waxing and waning sensorium, delirium vs recurrent psychosis 2/2 med non-compliance, current metabolic derangements include anemia and elevated creatinine and BUN, unclear chronicity. Family thinks patient is not safe to go home.    5/28= has been calm, no behavioral issues, has been compliant, no si or hi, no evident psychosis. At this time, there is no indication for an acute inpatient psychiatric admission. Chronic risks present but no imminent risk to hurt self or others. Family has some concerns that patient is unable to care for self in community, and so team will need to collaborate with them for a safe discharge plan    Recommendations  - Routine observation  - Continue Buspar 10mg BID PO, Risperidone 2mg q hs po, Trazodone 50mg q hs po  - INCREASE dose of Depakote to home dose of 500mg qhs po.   - Coordinate care with brother. Discussed with medical team  [id number], and MIKALA Olivier [id number] about need to ensure safe discharge plan  - Aggression= haldol 1mg q 6hrs prn im/iv- hold for qtc>=500

## 2019-05-28 NOTE — PHYSICAL THERAPY INITIAL EVALUATION ADULT - ASSISTIVE DEVICE FOR STAIR TRANSFER, REHAB EVAL
Subjective     History:   Debroah Rosas is a 22 y.o. female admitted on 8/10/2017 secondary to Acute severe exacerbation of asthma     Procedures: None    Patient seen and examined with CHARISMA Robin. Awake and alert with her mother sleeping at bedside. States she feels much improved today. Reports frequent dry coughing spells. Denies CP or palpitations. Denies dysuria. No acute events overnight per RN.     History taken from: patient, chart, and RN.      Objective     Vital Signs  Temp:  [97.6 °F (36.4 °C)-98.7 °F (37.1 °C)] 98 °F (36.7 °C)  Heart Rate:  [] 114  Resp:  [9-23] 15  BP: (104-144)/(65-87) 108/70    Intake/Output Summary (Last 24 hours) at 08/12/17 0813  Last data filed at 08/12/17 0500   Gross per 24 hour   Intake          3677.08 ml   Output                0 ml   Net          3677.08 ml         Physical Exam:  General:    Awake, alert, in no acute distress, thin appearing   Heart:      Normal S1 and S2. Tachycardic. No significant murmur, rubs or gallops appreciated.   Lungs:     Respirations regular, even and unlabored. Expiratory wheezes throughout but improved from yesterday. Air movement overall improved.     Abdomen:   Soft and nontender. No guarding, rebound tenderness or  organomegaly noted. Bowel sounds present x 4.   Extremities:  No clubbing, cyanosis or edema noted. Moves UE and LE equally B/L.     Results Review:      Results from last 7 days  Lab Units 08/12/17  0040 08/11/17  0519 08/11/17  0039 08/10/17  1238   WBC 10*3/mm3 12.89* 9.90 9.33 14.52*   HEMOGLOBIN g/dL 11.5* 9.8* 9.9* 12.9   PLATELETS 10*3/mm3 331 245 266 300       Results from last 7 days  Lab Units 08/12/17  0040 08/11/17  0519 08/11/17  0039 08/10/17  1238   SODIUM mmol/L 139 139 140 140   POTASSIUM mmol/L 4.4 4.1 4.0 3.8   CHLORIDE mmol/L 109 111 109 104   CO2 mmol/L 23.1* 21.4* 24.2* 26.6   BUN mg/dL 8 6* 8 9   CREATININE mg/dL 0.72 0.56 0.60 0.71   CALCIUM mg/dL 9.8 8.9 8.9 10.3*   GLUCOSE mg/dL 107 125* 133*  106       Results from last 7 days  Lab Units 08/11/17  0519 08/10/17  1238   BILIRUBIN mg/dL 0.3 0.4   ALK PHOS U/L 92 139*   AST (SGOT) U/L 26 34*   ALT (SGPT) U/L 16 21       Results from last 7 days  Lab Units 08/12/17  0040 08/10/17  1238   MAGNESIUM mg/dL 2.2 1.8           Results from last 7 days  Lab Units 08/11/17  1146 08/11/17  0519 08/11/17  0039   CK TOTAL U/L 266* 282* 303*   TROPONIN I ng/mL <0.006 <0.006 <0.006       Imaging Results (last 24 hours)     Procedure Component Value Units Date/Time    XR Chest AP [[id number]] Updated:  08/12/17 0724            Medications:    amoxicillin-clavulanate 1 tablet Oral Q12H   azithromycin 500 mg Intravenous Q24H   budesonide 0.25 mg Nebulization BID - RT   buprenorphine 8 mg Sublingual Daily   docusate sodium 100 mg Oral BID   famotidine 20 mg Intravenous Q12H   heparin (porcine) 5,000 Units Subcutaneous Q12H   ipratropium-albuterol 3 mL Nebulization Q4H - RT   methylPREDNISolone sodium succinate 40 mg Intravenous Q12H   IV Fluids 1000 mL + additives 125 mL/hr Intravenous Daily   Pharmacy Meds to Bed Consult  Does not apply Daily              Assessment/Plan   -Acute severe exacerbation of asthma with acute hypercapnic respiratory failure:  IV epinephrine and Ativan were given in ED.  Cont IV Solumedrol 40mg IV BID. Cont Albuterol inhalants in addition to budesonide inhalants. IV Pepcid has also been ordered. Add PRN Tessalon pearles and Robitussin. Transfer to PCU today. Pulm following with input appreciated.     -Severe sepsis: No evidence of pneumonia on CXR but pt is Mycoplasma (+). UA is abnormal with urine culture pending. Evidence of end-organ dysfunction with acute respiratory failure. Pt denies any dysuria. Currently on Azithromycin and Augmentin. Lactic acid quickly returned to normal. WBC elevated today but possibly 2/2 steroids. CRP is normal. Cont to follow cultures and repeat labs in the AM.      -Leukocytosis: Increased this AM but possibly 2/2  steroids. Cont antibiotics as outlined above. Repeat labs in the AM.      -Sinus tachycardia: Likely 2/2 above. EKG on admission revealed anterior T wave inversions. T wave inversions no longer present on repeat EKG. Serial CE's are negative. Cont to monitor on telemetry.       -Post-partum state:  Echocardiogram ordered in the setting of dyspnea, although pt does not appear volume overloaded. Echo reveals an EF of 65%.       -Mildly elevated liver enzymes: Viral hepatitis panel is non-reactive. Resolved this AM. Repeat CMP in the AM.     -Hx of substance abuse: Cont home Subutex      -DVT PPX: SQ heparin    Transfer to PCU.      Pt is at high risk 2/2 severe asthma exacerbation with respiratory failure at high risk for decompensation, severe sepsis and hx of substance abuse.         Dimitrios Blackman,   08/12/17  8:13 AM   right rail up/left rail down

## 2019-05-28 NOTE — PROGRESS NOTE BEHAVIORAL HEALTH - NSBHCHARTREVIEWVS_PSY_A_CORE FT
Vital Signs Last 24 Hrs  T(C): 36.4 (28 May 2019 05:40), Max: 37.1 (27 May 2019 13:09)  T(F): 97.6 (28 May 2019 05:40), Max: 98.7 (27 May 2019 13:09)  HR: 68 (28 May 2019 05:40) (68 - 85)  BP: 117/64 (28 May 2019 05:40) (104/61 - 134/79)  BP(mean): --  RR: 18 (28 May 2019 05:40) (18 - 18)  SpO2: 98% (28 May 2019 05:40) (96% - 100%)

## 2019-05-28 NOTE — PROGRESS NOTE BEHAVIORAL HEALTH - NSBHFUPINTERVALHXFT_PSY_A_CORE
Met with the patient. Pleasant, makes good eye contact, no distress. States that he has a chronic history of schizophrenia, and states that his last inpatient psych admission was 18 months ago for depression. He states that his last SA was when he was 16 years ago. He articulates that he has been compliant with medications and able to state dosage of them. States that he has an upcoming appt with psychiatrist Dr Galicia on June 18th. He states that he lives in an apartment, has 4 hours/4 days a week HHA, feels that his neighbor Wali helps other days, and he attends day program 2 days a week. Denies any mood symptoms when asked today, denies any si or hi, denies any a.vh or paranoia when asked.   Attempts were made to call brother [phone]- no answer to phone calls- left VM

## 2019-05-28 NOTE — PROGRESS NOTE ADULT - PROBLEM SELECTOR PLAN 3
Scr noted at 3.26 in ED, likely chronic. No baseline Cr in [facility] however Scr 3.89 1 week PTA per [facility] record supplied by brother  Pt and brother endorse long history of "kidney problem"  - FeNa 5.8 suggestive of post-renal/obstructive etiology  - F/u kidney ultrasound   - C/w tamsulosin as below Scr noted at 3.26 in ED, likely chronic. No baseline Cr in [facility] however Scr 3.89 1 week PTA per [facility] record supplied by brother  Pt and brother endorse long history of "kidney problem"  - Will f/u w/ PCP Ivan Rosales - [phone] for collateral   - FeNa 5.8 suggestive of post-renal/obstructive etiology  - F/u kidney ultrasound   - C/w tamsulosin as below CKD IV  Scr noted at 3.26 in ED, likely chronic. No baseline Cr in [facility] however Scr 3.89 1 week PTA per [facility] record supplied by brother  Pt and brother endorse long history of "kidney problem"  - Will f/u w/ PCP Ivan Rosales - [phone] for collateral   - FeNa 5.8 suggestive of post-renal/obstructive etiology  - F/u kidney ultrasound   - C/w tamsulosin as below

## 2019-05-28 NOTE — PROGRESS NOTE BEHAVIORAL HEALTH - NSBHFUPINTERVALCCFT_PSY_A_CORE
Reviewed initial behavioral health note. Patient was brought to the ed because patient has been needing more services I  community, and brother complains that he has been unable to care for self. No aggression over the course of his admission, he has been compliant with medications

## 2019-05-28 NOTE — PROGRESS NOTE BEHAVIORAL HEALTH - NSBHADMITCOUNSEL_PSY_A_CORE
risks and benefits of treatment options/importance of adherence to chosen treatment/diagnostic results/impressions and/or recommended studies/client/family/caregiver education/instructions for management, treatment and follow up/risk factor reduction

## 2019-05-29 ENCOUNTER — TRANSCRIPTION ENCOUNTER (OUTPATIENT)
Age: 59
End: 2019-05-29

## 2019-05-29 VITALS
SYSTOLIC BLOOD PRESSURE: 135 MMHG | TEMPERATURE: 98 F | OXYGEN SATURATION: 99 % | DIASTOLIC BLOOD PRESSURE: 76 MMHG | RESPIRATION RATE: 18 BRPM | HEART RATE: 84 BPM

## 2019-05-29 PROCEDURE — 99239 HOSP IP/OBS DSCHRG MGMT >30: CPT

## 2019-05-29 RX ORDER — FERROUS SULFATE 325(65) MG
1 TABLET ORAL
Qty: 30 | Refills: 0
Start: 2019-05-29 | End: 2019-06-27

## 2019-05-29 RX ORDER — ACETAMINOPHEN 500 MG
650 TABLET ORAL ONCE
Refills: 0 | Status: COMPLETED | OUTPATIENT
Start: 2019-05-29 | End: 2019-05-29

## 2019-05-29 RX ORDER — FERROUS SULFATE 325(65) MG
325 TABLET ORAL DAILY
Refills: 0 | Status: DISCONTINUED | OUTPATIENT
Start: 2019-05-29 | End: 2019-05-29

## 2019-05-29 RX ADMIN — Medication 10 MILLIGRAM(S): at 05:27

## 2019-05-29 RX ADMIN — Medication 650 MILLIGRAM(S): at 04:32

## 2019-05-29 RX ADMIN — HEPARIN SODIUM 5000 UNIT(S): 5000 INJECTION INTRAVENOUS; SUBCUTANEOUS at 05:27

## 2019-05-29 RX ADMIN — Medication 650 MILLIGRAM(S): at 03:55

## 2019-05-29 RX ADMIN — Medication 88 MICROGRAM(S): at 05:27

## 2019-05-29 NOTE — PROGRESS NOTE ADULT - PROBLEM SELECTOR PLAN 5
TSH elevated at 8.7 (possibly in setting poor medication adherence)   Will f/u T4 in AM  C/w home levothyroxine 88 mcg for now
TSH elevated at 8.7 (possibly in setting poor medication adherence)   C/w home levothyroxine 88 mcg  Will recommend repeat TSH/T4 6 weeks after discharge

## 2019-05-29 NOTE — DISCHARGE NOTE PROVIDER - PROVIDER TOKENS
FREE:[LAST:[Bobby],FIRST:[Ivan],PHONE:[phone]],FAX:[(   [phone]    -],ADDRESS:[[address] [id number]]]

## 2019-05-29 NOTE — DISCHARGE NOTE NURSING/CASE MANAGEMENT/SOCIAL WORK - NSDCDPATPORTLINK_GEN_ALL_CORE
You can access the [facility] Patient Portal, offered by [facility], by registering with the following website: [email or web address]

## 2019-05-29 NOTE — PROGRESS NOTE ADULT - SUBJECTIVE AND OBJECTIVE BOX
***************************************************************  Mark Hellerman, PGY1  Internal Medicine   pager: [facility]: [phone]; [facility]: [phone]  ***************************************************************    GAYLA PEARCE  59y  MRN: [id number]    Patient is a 59y old  Male who presents with a chief complaint of functional decline (27 May 2019 18:09)    Subjective:   - No events ON.   - No lightheadedness. Good urine output.   - Denies fever, CP, SOB, abn pain, N/V, dysuria. Tolerating diet.      MEDICATIONS  (STANDING):  busPIRone 10 milliGRAM(s) Oral two times a day  diVALproex  milliGRAM(s) Oral at bedtime  heparin  Injectable 5000 Unit(s) SubCutaneous every 8 hours  levothyroxine 88 MICROGram(s) Oral daily  risperiDONE   Tablet 2 milliGRAM(s) Oral at bedtime  simvastatin 40 milliGRAM(s) Oral at bedtime  tamsulosin 0.4 milliGRAM(s) Oral at bedtime  traZODone 50 milliGRAM(s) Oral at bedtime    MEDICATIONS  (PRN):      Objective:    Vitals: Vital Signs Last 24 Hrs  T(C): 36.4 (19 @ 05:40), Max: 37.1 (19 @ 13:09)  T(F): 97.6 (19 @ 05:40), Max: 98.7 (19 @ 13:09)  HR: 68 (19 @ 05:40) (68 - 85)  BP: 117/64 (19 @ 05:40) (104/61 - 134/79)  RR: 18 (19 @ 05:40) (18 - 18)  SpO2: 98% (19 @ 05:40) (96% - 100%)            I&O's Summary      PHYSICAL EXAM:  GENERAL: NAD, sitting up in stretcher  HEENT: PERRL, EOMI, MMM, no oropharyngeal lesions or erythema appreciated  Pulm: normal work of breathing, CTAB  CV: RRR, S1&S2+, no m/r/g appreciated  ABDOMEN: +BS, soft, obese, slight distention, nt  MSK: nl ROM  EXTREMITIES: non-pitting AURELIA with erythematous/brown pigmented scaly lichenified plaques involving the distal lower leg. scattered woody induration w/ apearance of lipodermatosclerosis.  unable to palpate DP b/l, warm lower extremities.  palpable pulses b/l UE.    Neuro: A&Ox3, no focal deficits  SKIN: warm and dry, rash as above    LABS:      143  |  107  |  27<H>  ----------------------------<  102<H>  4.4   |  20<L>  |  3.16<H>      141  |  107  |  29<H>  ----------------------------<  102<H>  4.1   |  20<L>  |  3.26<H>    Ca    9.2      28 May 2019 06:10  Ca    8.7      27 May 2019 14:20  Phos  3.5       Mg     2.0         TPro  6.6  /  Alb  3.3  /  TBili  < 0.2<L>  /  DBili  x   /  AST  10  /  ALT  7   /  AlkPhos  104                      Urinalysis Basic - ( 27 May 2019 18:35 )    Color: COLORLESS / Appearance: CLEAR / S.006 / pH: 6.5  Gluc: NEGATIVE / Ketone: NEGATIVE  / Bili: NEGATIVE / Urobili: NORMAL   Blood: NEGATIVE / Protein: NEGATIVE / Nitrite: NEGATIVE   Leuk Esterase: MODERATE / RBC: 0-2 / WBC 11-25   Sq Epi: OCC / Non Sq Epi: x / Bacteria: FEW                              9.4    8.62  )-----------( 388      ( 28 May 2019 06:10 )             29.7                         8.9    8.77  )-----------( 383      ( 27 May 2019 14:20 )             27.9     CAPILLARY BLOOD GLUCOSE          RADIOLOGY & ADDITIONAL TESTS:    Imaging Personally Reviewed:  [x ] YES  [ ] NO    Consultants involved in case:   Consultant(s) Notes Reviewed:  [ x] YES  [ ] NO:   Care Discussed with Consultants/Other Providers [x ] YES  [ ] NO
***************************************************************  Mark Hellerman, PGY1  Internal Medicine   pager: [facility]: [phone]; [facility]: [phone]  ***************************************************************    GAYLA PEARCE  59y  MRN: [id number]    Patient is a 59y old  Male who presents with a chief complaint of functional decline (28 May 2019 07:27)      Subjective:   - No overnight events. No CP/SOB/abd pain, N/V/D  - Pt requesting to go home today        MEDICATIONS  (STANDING):  busPIRone 10 milliGRAM(s) Oral two times a day  diVALproex  milliGRAM(s) Oral at bedtime  heparin  Injectable 5000 Unit(s) SubCutaneous every 8 hours  levothyroxine 88 MICROGram(s) Oral daily  risperiDONE   Tablet 2 milliGRAM(s) Oral at bedtime  simvastatin 40 milliGRAM(s) Oral at bedtime  tamsulosin 0.4 milliGRAM(s) Oral at bedtime  traZODone 50 milliGRAM(s) Oral at bedtime    MEDICATIONS  (PRN):  haloperidol    Injectable 1 milliGRAM(s) IV Push every 6 hours PRN Aggitation/aggression      Objective:    Vitals: Vital Signs Last 24 Hrs  T(C): 36.4 (19 @ 05:26), Max: 36.7 (19 @ 21:03)  T(F): 97.6 (19 @ 05:26), Max: 98.1 (19 @ 21:03)  HR: 78 (19 @ 05:26) (78 - 82)  BP: 134/63 (19 @ 05:26) (133/66 - 134/76)  RR: 18 (19 @ 05:26) (18 - 19)  SpO2: 96% (19 @ 05:26) (96% - 99%)                 PHYSICAL EXAM:  GENERAL: NAD, sitting up in stretcher  HEENT: PERRL, EOMI, MMM, no oropharyngeal lesions or erythema appreciated  Pulm: normal work of breathing, CTAB  CV: RRR, S1&S2+, no m/r/g appreciated  ABDOMEN: +BS, soft, obese, slight distention, nt  MSK: nl ROM  EXTREMITIES: non-pitting AURELIA with erythematous/brown pigmented scaly lichenified plaques involving the distal lower leg. scattered woody induration w/ appearance of lipodermatosclerosis.  unable to palpate DP b/l, warm lower extremities.  palpable pulses b/l UE.    Neuro: A&Ox3, no focal deficits  SKIN: warm and dry, rash as above    LABS:      143  |  107  |  27<H>  ----------------------------<  102<H>  4.4   |  20<L>  |  3.16<H>      141  |  107  |  29<H>  ----------------------------<  102<H>  4.1   |  20<L>  |  3.26<H>    Ca    9.2      28 May 2019 06:10  Ca    8.7      27 May 2019 14:20  Phos  3.5       Mg     2.0         TPro  6.6  /  Alb  3.3  /  TBili  < 0.2<L>  /  DBili  x   /  AST  10  /  ALT  7   /  AlkPhos  104                      Urinalysis Basic - ( 27 May 2019 18:35 )    Color: COLORLESS / Appearance: CLEAR / S.006 / pH: 6.5  Gluc: NEGATIVE / Ketone: NEGATIVE  / Bili: NEGATIVE / Urobili: NORMAL   Blood: NEGATIVE / Protein: NEGATIVE / Nitrite: NEGATIVE   Leuk Esterase: MODERATE / RBC: 0-2 / WBC 11-25   Sq Epi: OCC / Non Sq Epi: x / Bacteria: FEW                              9.4    8.62  )-----------( 388      ( 28 May 2019 06:10 )             29.7                         8.9    8.77  )-----------( 383      ( 27 May 2019 14:20 )             27.9     CAPILLARY BLOOD GLUCOSE          RADIOLOGY & ADDITIONAL TESTS:    Imaging Personally Reviewed:  [x ] YES  [ ] NO    Consultants involved in case:   Consultant(s) Notes Reviewed:  [ x] YES  [ ] NO:   Care Discussed with Consultants/Other Providers [x ] YES  [ ] NO

## 2019-05-29 NOTE — PROGRESS NOTE ADULT - PROBLEM SELECTOR PLAN 2
Normocytic hypoproliferative (retic index 1.41) anemia. Likely chronic 2/2 renal disease +/- YOSELIN. No b/l in [facility].  Hgb 9.4 1 week PTA per [facility] record supplied by brother and discussion with PMD. Per pt EGD at [facility] last week negative for bleed "totally normal".  Pt denies hematuria or BRBPR. Folate and B12 wnl. Iron slightly low at 33. PMD confirms b/l anemia ~10.
Normocytic hypoproliferative (retic index 1.41) anemia. Hgb 8.9 in ED, now 9.4.   Likely chronic 2/2 renal disease +/- YOSELIN. No b/l in [facility].  Hgb 9.4 1 week PTA per [facility] record supplied by brother. Per pt EGD at [facility] last week negative for bleed "totally normal".  Pt denies hematuria or BRBPR. Folate and B12 wnl. Iron slightly low at 33.

## 2019-05-29 NOTE — PROGRESS NOTE ADULT - PROBLEM SELECTOR PLAN 1
Psych hx: schizophrenia/schizoaffective disorder (30+ hospitalization, last 2018 at [place]), brought in by brother for concern of functional decline in setting of medication non-adherence. Hx 4 suicide attempts (last 20 yrs ago).  - Valproic acid level subtherapeutic at 17.7  - C/w home meds including: divalproex sodium 1000 daily, resperidone 2 mg daily  buspirone 10 mg BID, bupropion 75 mg BID, trazodone 50 mg daily for now  - F/u psych recs
Psych hx: schizophrenia/schizoaffective disorder (30+ hospitalization, last 2018 at [place]), brought in by brother for concern of functional decline in setting of medication non-adherence. Hx 4 suicide attempts (last 20 yrs ago).  - Appreciate psych: no need for inpt hospitalization   - Valproic acid level subtherapeutic at 17.7  - C/w Buspar 10mg BID PO, Risperidone 2mg q hs po, Trazodone 50mg q hs po  - Depakote 500mg qhs po.   - Haldol 1mg q 6hrs prn im/iv for aggression - hold for qtc>=500   - F/w with MIKALA Olivier re: safe discharge plan

## 2019-05-29 NOTE — PROGRESS NOTE ADULT - PROBLEM SELECTOR PLAN 4
Tense non-pitting lower extremity edema and rash suggestive of statis dermatitis.  Pt notes hx of lower extremity clot last week at [facility] but states he was not sent home on anticoagulation. Denies hx of PE. No SOB.   - F/u lower extremity dopplers to r/o DVT
Tense non-pitting lower extremity edema and rash suggestive of statis dermatitis.  Pt notes hx of lower extremity clot last week at [facility] but states he was not sent home on anticoagulation. Denies hx of PE. No SOB. LE dopplers w/o DVT.

## 2019-05-29 NOTE — DISCHARGE NOTE PROVIDER - NSDCCPCAREPLAN_GEN_ALL_CORE_FT
PRINCIPAL DISCHARGE DIAGNOSIS  Diagnosis: Disorientation  Assessment and Plan of Treatment: The blood level of one of your medications was found to be lower than the level at which it is helpful.  This is likely because you missed some doses of the medication. Please continue to take all medications as prescribed and continue to follow up with your outpatient psychiatriast.      SECONDARY DISCHARGE DIAGNOSES  Diagnosis: Hypothyroidism  Assessment and Plan of Treatment: One of the blood levels we monitor to determine if you are on the right dose of medication for your thyroid function was higher than normal which indicates that the medication for your thyroid is not therapeutic. PRINCIPAL DISCHARGE DIAGNOSIS  Diagnosis: Disorientation  Assessment and Plan of Treatment: The blood level of one of your medications was found to be lower than the level at which it is helpful.  This is likely because you missed some doses of the medication. Please continue to take all medications as prescribed and continue to follow up with your outpatient psychiatriast.      SECONDARY DISCHARGE DIAGNOSES  Diagnosis: Hypothyroidism  Assessment and Plan of Treatment: One of the blood levels we monitor to determine if you are on the right dose of medication for your thyroid function was higher than normal which indicates that the medication for your thyroid is not therapeutic. This is likely becuase you missed some doses of medication or the medication dose needs to be higher.  We recommend remeasuring this level in 6 weeks after the medication has had time to reach a therapeutic level in your blood.

## 2019-05-29 NOTE — PROGRESS NOTE ADULT - ATTENDING COMMENTS
Time planning discharge 35 minutes.
Patient seen and examined.  Case discussed with house staff.  Agree with above as edited.  Patient is a 59yoM with h/o schizophrenia/schizoaffective disorder, hypothyroidism, BPH, CKD IV, anemia brought in by brother for concern of functional decline in setting of medication non-adherence. Cr and h/h at baseline. Appreciate psychiatry f/u. Working with social work for safe disposition plan.  Hypothyroidism - TSH elevated but in context of medication non-adherence. Resumed synthroid - check TFTs in 6 weeks.

## 2019-05-29 NOTE — PROGRESS NOTE ADULT - ASSESSMENT
58 yo man w/ hypothyroidism, schizophrenia/schizoaffective disorder, brought in by brother for concern of functional decline in setting of medication non-adherence. Pt noted with anemia and elevated Cr (likely chronic), w/u pending.
60 yo man w/ CKD4, hypothyroidism, schizophrenia/schizoaffective disorder, brought in by brother for concern of functional decline in setting of medication non-adherence.

## 2019-05-29 NOTE — DISCHARGE NOTE PROVIDER - HOSPITAL COURSE
58 yo man CKD4, hypothyroidism, schizophrenia/schizoaffective disorder (30+ hospitalization), brought in by brother for concern of functional decline in setting of medication non-adherence. Pt's brother concerned of functional decline, states pt not adhering to medication, apartment noted with lit cigarettes on the floor, pt not showering/ tending to hygiene, reduced sleep, increased anxiety and conceptual disorganization. In ED pt found afebrile, HR 70s-80s, -114/56-79, O2sat 100% on RA. Labs notable for Scr 3.26 (chronic at baseline), Hgb 9.4 (baseline ~10 per PMD), valproic acid level subtherapeutic at 17.7, TSH elevated at 8.7. Pt evaluated by [facility] with no need for inpatient psych. Iron studies revealed slightly low iron levels at 33 (TIBC and ferritin wnl), pt started on ferrous sulfate 325 daily.  Pt deemed medically stable for discharge on 5/29/2019. Recommend repeat TSH in 6 weeks after discharge to reassess thyroid function.

## 2019-11-22 NOTE — H&P ADULT - PROBLEM SELECTOR PLAN 7
follows with cardiologist for murmur, MVP  METS>9  no recent CP, SOB or syncope Diet: Renal  DVT: subq heparin

## 2019-12-24 PROBLEM — E03.9 HYPOTHYROIDISM, UNSPECIFIED: Chronic | Status: ACTIVE | Noted: 2019-05-27

## 2019-12-30 ENCOUNTER — APPOINTMENT (OUTPATIENT)
Dept: UROLOGY | Facility: CLINIC | Age: 59
End: 2019-12-30

## 2020-02-07 ENCOUNTER — EMERGENCY (EMERGENCY)
Facility: HOSPITAL | Age: 60
LOS: 1 days | Discharge: HOME | End: 2020-02-07
Attending: EMERGENCY MEDICINE
Payer: MEDICAID

## 2020-02-07 VITALS
WEIGHT: 253.97 LBS | TEMPERATURE: 98 F | DIASTOLIC BLOOD PRESSURE: 75 MMHG | RESPIRATION RATE: 17 BRPM | HEART RATE: 90 BPM | SYSTOLIC BLOOD PRESSURE: 139 MMHG | HEIGHT: 68 IN | OXYGEN SATURATION: 96 %

## 2020-02-07 DIAGNOSIS — M25.551 PAIN IN RIGHT HIP: ICD-10-CM

## 2020-02-07 DIAGNOSIS — M79.651 PAIN IN RIGHT THIGH: ICD-10-CM

## 2020-02-07 PROCEDURE — 73552 X-RAY EXAM OF FEMUR 2/>: CPT

## 2020-02-07 PROCEDURE — 73552 X-RAY EXAM OF FEMUR 2/>: CPT | Mod: 26,RT

## 2020-02-07 PROCEDURE — 99283 EMERGENCY DEPT VISIT LOW MDM: CPT

## 2020-02-07 PROCEDURE — 99284 EMERGENCY DEPT VISIT MOD MDM: CPT | Mod: 25

## 2020-02-07 RX ORDER — METHOCARBAMOL 500 MG/1
1500 TABLET, FILM COATED ORAL ONCE
Refills: 0 | Status: COMPLETED | OUTPATIENT
Start: 2020-02-07 | End: 2020-02-07

## 2020-02-07 RX ORDER — IBUPROFEN 200 MG
600 TABLET ORAL ONCE
Refills: 0 | Status: COMPLETED | OUTPATIENT
Start: 2020-02-07 | End: 2020-02-07

## 2020-02-07 RX ADMIN — METHOCARBAMOL 1500 MILLIGRAM(S): 500 TABLET, FILM COATED ORAL at 19:39

## 2020-02-07 RX ADMIN — Medication 600 MILLIGRAM(S): at 19:39

## 2020-02-07 NOTE — ED PROVIDER NOTE - CONSTITUTIONAL, MLM
normal... Well appearing, awake, alert, oriented to person, place, time/situation and in no apparent distress. unkept

## 2020-02-07 NOTE — ED ADULT NURSE REASSESSMENT NOTE - NS ED NURSE REASSESS COMMENT FT1
Pt discharged and to be transferred back to [facility], called facility and spoke with the  Neal, Nursing Supervisor not available at the time. Pt stable at time of discharge, wheeled on a stretcher.

## 2020-02-07 NOTE — ED PROVIDER NOTE - OBJECTIVE STATEMENT
60 yr old male from [facility] c/o right anterior proximal thigh pain x 6 months gradually worsening. pt been taking tylenol and not helping.  denies any numbness or tingling.  pt pain localized.  no fever. pain with walking

## 2020-02-07 NOTE — ED ADULT NURSE NOTE - PAIN: PRESENCE, MLM
The Neb solution was ordered on 6/8 to [facility]. Pt did not know that and he has not called there. ( Budesonide and Performist) Pt will call  and go and  these neb solutions.    complains of pain/discomfort

## 2020-02-07 NOTE — ED PROVIDER NOTE - PATIENT PORTAL LINK FT
You can access the FollowMyHealth Patient Portal offered by [facility] by registering at the following website: [email or web address]. By joining [employer]’s FollowMyHealth portal, you will also be able to view your health information using other applications (apps) compatible with our system.

## 2020-02-07 NOTE — ED PROVIDER NOTE - MUSCULOSKELETAL, MLM
Spine appears normal, range of motion is not limited, no joint tenderness.  right anterior proximal thigh pain, no underlying skin changes ,no ecchymosis. b/l chornic venous stasis LAD

## 2020-02-07 NOTE — ED PROVIDER NOTE - CLINICAL SUMMARY MEDICAL DECISION MAKING FREE TEXT BOX
60 yr old male from [facility] c/o right anterior proximal thigh pain x 6 months gradually worsening. pt been taking tylenol and not helping.  denies any numbness or tingling.  pt pain localized.  no fever. pain with walking    thigh pain likely muscle strain will get xr to assess femur.  robaxin for spasm. and motrin

## 2020-02-24 ENCOUNTER — EMERGENCY (EMERGENCY)
Facility: HOSPITAL | Age: 60
LOS: 1 days | Discharge: HOME | End: 2020-02-24
Attending: STUDENT IN AN ORGANIZED HEALTH CARE EDUCATION/TRAINING PROGRAM
Payer: MEDICAID

## 2020-02-24 VITALS
HEART RATE: 84 BPM | DIASTOLIC BLOOD PRESSURE: 80 MMHG | WEIGHT: 257.94 LBS | TEMPERATURE: 98 F | OXYGEN SATURATION: 98 % | RESPIRATION RATE: 18 BRPM | SYSTOLIC BLOOD PRESSURE: 159 MMHG | HEIGHT: 68 IN

## 2020-02-24 PROCEDURE — 73502 X-RAY EXAM HIP UNI 2-3 VIEWS: CPT | Mod: 26,RT

## 2020-02-24 PROCEDURE — 73502 X-RAY EXAM HIP UNI 2-3 VIEWS: CPT

## 2020-02-24 PROCEDURE — 99283 EMERGENCY DEPT VISIT LOW MDM: CPT

## 2020-02-24 PROCEDURE — 99283 EMERGENCY DEPT VISIT LOW MDM: CPT | Mod: 25

## 2020-02-24 NOTE — ED PROVIDER NOTE - OBJECTIVE STATEMENT
59 y/o M with a PMHx of mood disorder, HLD, BPH, anemia and no known PSHx presents to ED from assisted living for right hip pain. Pt reports falling several months ago with increased work for ambulating, uses walker. Today, more pain than usual, reported this pain to doctor in facility-sent to hospital for further management. Denies current hip pain or usage of pain medication. Denies fever/chills/chest pain/ sob/ N/V/D, dysuria, headache, LOC, rash. NKDA.

## 2020-02-24 NOTE — ED PROVIDER NOTE - NSFOLLOWUPINSTRUCTIONS_ED_ALL_ED_FT
Follow-up with your primary care doctor in 24-48hrs. Return immediately to the emergency department if any worsening or concerning symptoms.

## 2020-02-24 NOTE — ED PROVIDER NOTE - PATIENT PORTAL LINK FT
You can access the FollowMyHealth Patient Portal offered by [facility] by registering at the following website: [email or web address]. By joining [employer]’s FollowMyHealth portal, you will also be able to view your health information using other applications (apps) compatible with our system.

## 2020-02-24 NOTE — ED ADULT NURSE NOTE - OBJECTIVE STATEMENT
BIB EMS from [facility] with c/o Rt hip, Rt leg pain for the past 6month. denies fever chills SOB. B/L LE discoloration and swelling noted

## 2020-02-24 NOTE — ED PROVIDER NOTE - PMH
Anemia    BPH (benign prostatic hyperplasia)    HLD (hyperlipidemia)    Hypothyroid    Mood disorder

## 2020-02-24 NOTE — ED PROVIDER NOTE - PROGRESS NOTE DETAILS
AV: Patient is ambulatory here with assistance, wants to go home. spoke to nurse at assisted living, who states patient reported increasing pain at right hip. Discussed Xray findings. Will dc back to nursing facility.

## 2021-06-01 NOTE — ED ADULT NURSE NOTE - NSHOSCREENINGQ1_ED_ALL_ED
No PAST SURGICAL HISTORY:  AICD (automatic cardioverter/defibrillator) present 12/19/17    Cardiac pacemaker 12/19/17    History of cardioversion 9/11/20    History of tonsillectomy and adenoidectomy     S/P ablation of atrial fibrillation 10/29/20    S/P aortic valve replacement 3/13/2004    S/P cardiac cath 1994, 1995, 1999, 2002, 2004    S/P colonoscopy 2001, 2002, 2006, 2011, 2016    S/P endoscopy 2006    S/P hernia repair 2002    S/P TURP 1996    Status post ablation of ventricular arrhythmia 2/14/19

## 2021-08-30 ENCOUNTER — INPATIENT (INPATIENT)
Facility: HOSPITAL | Age: 61
LOS: 8 days | Discharge: NURSING, REHAB OR LONG-TERM CARE | DRG: 682 | End: 2021-09-08
Attending: INTERNAL MEDICINE | Admitting: INTERNAL MEDICINE
Payer: MEDICAID

## 2021-08-30 VITALS
RESPIRATION RATE: 16 BRPM | TEMPERATURE: 98 F | HEART RATE: 81 BPM | WEIGHT: 292.99 LBS | DIASTOLIC BLOOD PRESSURE: 71 MMHG | OXYGEN SATURATION: 93 % | SYSTOLIC BLOOD PRESSURE: 122 MMHG | HEIGHT: 68 IN

## 2021-08-30 DIAGNOSIS — R26.2 DIFFICULTY IN WALKING, NOT ELSEWHERE CLASSIFIED: ICD-10-CM

## 2021-08-30 DIAGNOSIS — E03.9 HYPOTHYROIDISM, UNSPECIFIED: ICD-10-CM

## 2021-08-30 DIAGNOSIS — J44.9 CHRONIC OBSTRUCTIVE PULMONARY DISEASE, UNSPECIFIED: ICD-10-CM

## 2021-08-30 DIAGNOSIS — N40.0 BENIGN PROSTATIC HYPERPLASIA WITHOUT LOWER URINARY TRACT SYMPTOMS: ICD-10-CM

## 2021-08-30 DIAGNOSIS — D64.9 ANEMIA, UNSPECIFIED: ICD-10-CM

## 2021-08-30 DIAGNOSIS — I89.0 LYMPHEDEMA, NOT ELSEWHERE CLASSIFIED: ICD-10-CM

## 2021-08-30 DIAGNOSIS — Z29.9 ENCOUNTER FOR PROPHYLACTIC MEASURES, UNSPECIFIED: ICD-10-CM

## 2021-08-30 DIAGNOSIS — F31.9 BIPOLAR DISORDER, UNSPECIFIED: ICD-10-CM

## 2021-08-30 DIAGNOSIS — R53.1 WEAKNESS: ICD-10-CM

## 2021-08-30 DIAGNOSIS — N17.9 ACUTE KIDNEY FAILURE, UNSPECIFIED: ICD-10-CM

## 2021-08-30 DIAGNOSIS — R77.8 OTHER SPECIFIED ABNORMALITIES OF PLASMA PROTEINS: ICD-10-CM

## 2021-08-30 LAB
ALBUMIN SERPL ELPH-MCNC: 2.7 G/DL — LOW (ref 3.5–5)
ALP SERPL-CCNC: 98 U/L — SIGNIFICANT CHANGE UP (ref 40–120)
ALT FLD-CCNC: 12 U/L DA — SIGNIFICANT CHANGE UP (ref 10–60)
ANION GAP SERPL CALC-SCNC: 4 MMOL/L — LOW (ref 5–17)
ANION GAP SERPL CALC-SCNC: 5 MMOL/L — SIGNIFICANT CHANGE UP (ref 5–17)
APPEARANCE UR: CLEAR — SIGNIFICANT CHANGE UP
AST SERPL-CCNC: 8 U/L — LOW (ref 10–40)
BASOPHILS # BLD AUTO: 0.03 K/UL — SIGNIFICANT CHANGE UP (ref 0–0.2)
BASOPHILS NFR BLD AUTO: 0.3 % — SIGNIFICANT CHANGE UP (ref 0–2)
BILIRUB SERPL-MCNC: 0.2 MG/DL — SIGNIFICANT CHANGE UP (ref 0.2–1.2)
BILIRUB UR-MCNC: NEGATIVE — SIGNIFICANT CHANGE UP
BUN SERPL-MCNC: 41 MG/DL — HIGH (ref 7–18)
BUN SERPL-MCNC: 43 MG/DL — HIGH (ref 7–18)
CALCIUM SERPL-MCNC: 8.7 MG/DL — SIGNIFICANT CHANGE UP (ref 8.4–10.5)
CALCIUM SERPL-MCNC: 8.7 MG/DL — SIGNIFICANT CHANGE UP (ref 8.4–10.5)
CHLORIDE SERPL-SCNC: 107 MMOL/L — SIGNIFICANT CHANGE UP (ref 96–108)
CHLORIDE SERPL-SCNC: 109 MMOL/L — HIGH (ref 96–108)
CO2 SERPL-SCNC: 27 MMOL/L — SIGNIFICANT CHANGE UP (ref 22–31)
CO2 SERPL-SCNC: 29 MMOL/L — SIGNIFICANT CHANGE UP (ref 22–31)
COLOR SPEC: YELLOW — SIGNIFICANT CHANGE UP
CREAT SERPL-MCNC: 3.82 MG/DL — HIGH (ref 0.5–1.3)
CREAT SERPL-MCNC: 3.86 MG/DL — HIGH (ref 0.5–1.3)
DIFF PNL FLD: NEGATIVE — SIGNIFICANT CHANGE UP
EOSINOPHIL # BLD AUTO: 0.08 K/UL — SIGNIFICANT CHANGE UP (ref 0–0.5)
EOSINOPHIL NFR BLD AUTO: 0.9 % — SIGNIFICANT CHANGE UP (ref 0–6)
GLUCOSE BLDC GLUCOMTR-MCNC: 139 MG/DL — HIGH (ref 70–99)
GLUCOSE SERPL-MCNC: 101 MG/DL — HIGH (ref 70–99)
GLUCOSE SERPL-MCNC: 87 MG/DL — SIGNIFICANT CHANGE UP (ref 70–99)
GLUCOSE UR QL: NEGATIVE — SIGNIFICANT CHANGE UP
HCT VFR BLD CALC: 37 % — LOW (ref 39–50)
HGB BLD-MCNC: 11.5 G/DL — LOW (ref 13–17)
IMM GRANULOCYTES NFR BLD AUTO: 0.7 % — SIGNIFICANT CHANGE UP (ref 0–1.5)
KETONES UR-MCNC: NEGATIVE — SIGNIFICANT CHANGE UP
LACTATE SERPL-SCNC: 0.8 MMOL/L — SIGNIFICANT CHANGE UP (ref 0.7–2)
LEUKOCYTE ESTERASE UR-ACNC: NEGATIVE — SIGNIFICANT CHANGE UP
LYMPHOCYTES # BLD AUTO: 1.12 K/UL — SIGNIFICANT CHANGE UP (ref 1–3.3)
LYMPHOCYTES # BLD AUTO: 12.3 % — LOW (ref 13–44)
MAGNESIUM SERPL-MCNC: 2.7 MG/DL — HIGH (ref 1.6–2.6)
MCHC RBC-ENTMCNC: 31.1 GM/DL — LOW (ref 32–36)
MCHC RBC-ENTMCNC: 31.5 PG — SIGNIFICANT CHANGE UP (ref 27–34)
MCV RBC AUTO: 101.4 FL — HIGH (ref 80–100)
MONOCYTES # BLD AUTO: 1.02 K/UL — HIGH (ref 0–0.9)
MONOCYTES NFR BLD AUTO: 11.2 % — SIGNIFICANT CHANGE UP (ref 2–14)
NEUTROPHILS # BLD AUTO: 6.76 K/UL — SIGNIFICANT CHANGE UP (ref 1.8–7.4)
NEUTROPHILS NFR BLD AUTO: 74.6 % — SIGNIFICANT CHANGE UP (ref 43–77)
NITRITE UR-MCNC: NEGATIVE — SIGNIFICANT CHANGE UP
NRBC # BLD: 0 /100 WBCS — SIGNIFICANT CHANGE UP (ref 0–0)
NT-PROBNP SERPL-SCNC: 3159 PG/ML — HIGH (ref 0–125)
PH UR: 5 — SIGNIFICANT CHANGE UP (ref 5–8)
PLATELET # BLD AUTO: 244 K/UL — SIGNIFICANT CHANGE UP (ref 150–400)
POTASSIUM SERPL-MCNC: 4.7 MMOL/L — SIGNIFICANT CHANGE UP (ref 3.5–5.3)
POTASSIUM SERPL-MCNC: 5 MMOL/L — SIGNIFICANT CHANGE UP (ref 3.5–5.3)
POTASSIUM SERPL-SCNC: 4.7 MMOL/L — SIGNIFICANT CHANGE UP (ref 3.5–5.3)
POTASSIUM SERPL-SCNC: 5 MMOL/L — SIGNIFICANT CHANGE UP (ref 3.5–5.3)
PROT SERPL-MCNC: 7.4 G/DL — SIGNIFICANT CHANGE UP (ref 6–8.3)
PROT UR-MCNC: 100
RBC # BLD: 3.65 M/UL — LOW (ref 4.2–5.8)
RBC # FLD: 14.1 % — SIGNIFICANT CHANGE UP (ref 10.3–14.5)
SARS-COV-2 RNA SPEC QL NAA+PROBE: SIGNIFICANT CHANGE UP
SODIUM SERPL-SCNC: 140 MMOL/L — SIGNIFICANT CHANGE UP (ref 135–145)
SODIUM SERPL-SCNC: 141 MMOL/L — SIGNIFICANT CHANGE UP (ref 135–145)
SP GR SPEC: 1.01 — SIGNIFICANT CHANGE UP (ref 1.01–1.02)
TROPONIN I SERPL-MCNC: 0.18 NG/ML — HIGH (ref 0–0.04)
TROPONIN I SERPL-MCNC: 0.2 NG/ML — HIGH (ref 0–0.04)
UROBILINOGEN FLD QL: NEGATIVE — SIGNIFICANT CHANGE UP
WBC # BLD: 9.07 K/UL — SIGNIFICANT CHANGE UP (ref 3.8–10.5)
WBC # FLD AUTO: 9.07 K/UL — SIGNIFICANT CHANGE UP (ref 3.8–10.5)

## 2021-08-30 PROCEDURE — 99285 EMERGENCY DEPT VISIT HI MDM: CPT | Mod: CS

## 2021-08-30 PROCEDURE — 93970 EXTREMITY STUDY: CPT | Mod: 26

## 2021-08-30 PROCEDURE — 70450 CT HEAD/BRAIN W/O DYE: CPT | Mod: 26,MA

## 2021-08-30 PROCEDURE — 71045 X-RAY EXAM CHEST 1 VIEW: CPT | Mod: 26

## 2021-08-30 RX ORDER — LEVOTHYROXINE SODIUM 125 MCG
125 TABLET ORAL DAILY
Refills: 0 | Status: DISCONTINUED | OUTPATIENT
Start: 2021-08-30 | End: 2021-09-01

## 2021-08-30 RX ORDER — TAMSULOSIN HYDROCHLORIDE 0.4 MG/1
0.4 CAPSULE ORAL AT BEDTIME
Refills: 0 | Status: DISCONTINUED | OUTPATIENT
Start: 2021-08-30 | End: 2021-09-04

## 2021-08-30 RX ORDER — ASPIRIN/CALCIUM CARB/MAGNESIUM 324 MG
324 TABLET ORAL ONCE
Refills: 0 | Status: COMPLETED | OUTPATIENT
Start: 2021-08-30 | End: 2021-08-30

## 2021-08-30 RX ORDER — FOLIC ACID 0.8 MG
1 TABLET ORAL DAILY
Refills: 0 | Status: DISCONTINUED | OUTPATIENT
Start: 2021-08-30 | End: 2021-09-08

## 2021-08-30 RX ORDER — DIVALPROEX SODIUM 500 MG/1
2 TABLET, DELAYED RELEASE ORAL
Qty: 0 | Refills: 0 | DISCHARGE

## 2021-08-30 RX ORDER — FUROSEMIDE 40 MG
20 TABLET ORAL ONCE
Refills: 0 | Status: DISCONTINUED | OUTPATIENT
Start: 2021-08-30 | End: 2021-08-30

## 2021-08-30 RX ORDER — RISPERIDONE 4 MG/1
2 TABLET ORAL
Refills: 0 | Status: DISCONTINUED | OUTPATIENT
Start: 2021-08-30 | End: 2021-09-08

## 2021-08-30 RX ORDER — SIMVASTATIN 20 MG/1
20 TABLET, FILM COATED ORAL AT BEDTIME
Refills: 0 | Status: DISCONTINUED | OUTPATIENT
Start: 2021-08-30 | End: 2021-09-08

## 2021-08-30 RX ORDER — FUROSEMIDE 40 MG
40 TABLET ORAL ONCE
Refills: 0 | Status: COMPLETED | OUTPATIENT
Start: 2021-08-30 | End: 2021-08-30

## 2021-08-30 RX ORDER — FERROUS SULFATE 325(65) MG
325 TABLET ORAL DAILY
Refills: 0 | Status: DISCONTINUED | OUTPATIENT
Start: 2021-08-30 | End: 2021-09-08

## 2021-08-30 RX ORDER — BUPROPION HYDROCHLORIDE 150 MG/1
150 TABLET, EXTENDED RELEASE ORAL DAILY
Refills: 0 | Status: DISCONTINUED | OUTPATIENT
Start: 2021-08-30 | End: 2021-09-08

## 2021-08-30 RX ORDER — ENOXAPARIN SODIUM 100 MG/ML
40 INJECTION SUBCUTANEOUS DAILY
Refills: 0 | Status: DISCONTINUED | OUTPATIENT
Start: 2021-08-30 | End: 2021-08-30

## 2021-08-30 RX ORDER — HEPARIN SODIUM 5000 [USP'U]/ML
5000 INJECTION INTRAVENOUS; SUBCUTANEOUS EVERY 8 HOURS
Refills: 0 | Status: DISCONTINUED | OUTPATIENT
Start: 2021-08-30 | End: 2021-09-08

## 2021-08-30 RX ORDER — TRAZODONE HCL 50 MG
1 TABLET ORAL
Qty: 0 | Refills: 0 | DISCHARGE

## 2021-08-30 RX ORDER — SENNA PLUS 8.6 MG/1
2 TABLET ORAL AT BEDTIME
Refills: 0 | Status: DISCONTINUED | OUTPATIENT
Start: 2021-08-30 | End: 2021-09-08

## 2021-08-30 RX ORDER — RISPERIDONE 4 MG/1
1 TABLET ORAL
Qty: 0 | Refills: 0 | DISCHARGE

## 2021-08-30 RX ORDER — SIMVASTATIN 20 MG/1
1 TABLET, FILM COATED ORAL
Qty: 0 | Refills: 0 | DISCHARGE

## 2021-08-30 RX ORDER — LEVOTHYROXINE SODIUM 125 MCG
1 TABLET ORAL
Qty: 0 | Refills: 0 | DISCHARGE

## 2021-08-30 RX ORDER — HYDROMORPHONE HYDROCHLORIDE 2 MG/ML
4 INJECTION INTRAMUSCULAR; INTRAVENOUS; SUBCUTANEOUS DAILY
Refills: 0 | Status: DISCONTINUED | OUTPATIENT
Start: 2021-08-30 | End: 2021-08-31

## 2021-08-30 RX ORDER — POLYETHYLENE GLYCOL 3350 17 G/17G
17 POWDER, FOR SOLUTION ORAL DAILY
Refills: 0 | Status: DISCONTINUED | OUTPATIENT
Start: 2021-08-30 | End: 2021-09-08

## 2021-08-30 RX ORDER — PANTOPRAZOLE SODIUM 20 MG/1
1 TABLET, DELAYED RELEASE ORAL
Qty: 0 | Refills: 0 | DISCHARGE

## 2021-08-30 RX ORDER — ALBUTEROL 90 UG/1
2 AEROSOL, METERED ORAL
Refills: 0 | Status: DISCONTINUED | OUTPATIENT
Start: 2021-08-30 | End: 2021-09-08

## 2021-08-30 RX ORDER — FINASTERIDE 5 MG/1
5 TABLET, FILM COATED ORAL DAILY
Refills: 0 | Status: DISCONTINUED | OUTPATIENT
Start: 2021-08-30 | End: 2021-09-08

## 2021-08-30 RX ORDER — ASPIRIN/CALCIUM CARB/MAGNESIUM 324 MG
81 TABLET ORAL DAILY
Refills: 0 | Status: DISCONTINUED | OUTPATIENT
Start: 2021-08-30 | End: 2021-09-08

## 2021-08-30 RX ORDER — PANTOPRAZOLE SODIUM 20 MG/1
40 TABLET, DELAYED RELEASE ORAL
Refills: 0 | Status: DISCONTINUED | OUTPATIENT
Start: 2021-08-30 | End: 2021-09-08

## 2021-08-30 RX ORDER — ACETAMINOPHEN 500 MG
650 TABLET ORAL EVERY 6 HOURS
Refills: 0 | Status: DISCONTINUED | OUTPATIENT
Start: 2021-08-30 | End: 2021-09-01

## 2021-08-30 RX ORDER — DIVALPROEX SODIUM 500 MG/1
500 TABLET, DELAYED RELEASE ORAL
Refills: 0 | Status: DISCONTINUED | OUTPATIENT
Start: 2021-08-30 | End: 2021-09-08

## 2021-08-30 RX ORDER — BUDESONIDE AND FORMOTEROL FUMARATE DIHYDRATE 160; 4.5 UG/1; UG/1
2 AEROSOL RESPIRATORY (INHALATION)
Refills: 0 | Status: DISCONTINUED | OUTPATIENT
Start: 2021-08-30 | End: 2021-09-08

## 2021-08-30 RX ORDER — CHOLECALCIFEROL (VITAMIN D3) 125 MCG
1000 CAPSULE ORAL DAILY
Refills: 0 | Status: DISCONTINUED | OUTPATIENT
Start: 2021-08-30 | End: 2021-09-08

## 2021-08-30 RX ADMIN — SENNA PLUS 2 TABLET(S): 8.6 TABLET ORAL at 22:15

## 2021-08-30 RX ADMIN — Medication 40 MILLIGRAM(S): at 19:04

## 2021-08-30 RX ADMIN — TAMSULOSIN HYDROCHLORIDE 0.4 MILLIGRAM(S): 0.4 CAPSULE ORAL at 22:14

## 2021-08-30 RX ADMIN — RISPERIDONE 2 MILLIGRAM(S): 4 TABLET ORAL at 22:14

## 2021-08-30 RX ADMIN — HEPARIN SODIUM 5000 UNIT(S): 5000 INJECTION INTRAVENOUS; SUBCUTANEOUS at 22:14

## 2021-08-30 RX ADMIN — Medication 324 MILLIGRAM(S): at 14:22

## 2021-08-30 RX ADMIN — SIMVASTATIN 20 MILLIGRAM(S): 20 TABLET, FILM COATED ORAL at 22:14

## 2021-08-30 NOTE — ED PROVIDER NOTE - CARE PLAN
1 Principal Discharge DX:	Weakness  Secondary Diagnosis:	Demand ischemia of myocardium  Secondary Diagnosis:	SHEYLA (acute kidney injury)

## 2021-08-30 NOTE — H&P ADULT - ATTENDING COMMENTS
61 year old man with morbid obesity from [facility] [place] ambulates w/ walker has past medical hx of COPD not on O2 at home, hypothyroidism, anemia, left DVT, CKD, BPH, HLD, bipolar disorder, ambulatory dysfunction, lymphedema was BIBEMS as patient was complaining of worsening lower extremity weakness, lightheaded and overall not feeling well for one day. Of note, patient with slurred speech at baseline since childhood and known ambulatory dysfunction reason why he is on IRA. Patient denies chest pain, palpitations, sob or other symptoms.    GOC full code     # HYPOGLYCEMIA S/T POOR PO INTAKE - MONITOR BG  # ELEVATED TROPONIN - TREND, F/U ECHOCARDIOGRAM, CARDIOLOGY CONSULT  # SHEYLA ON CKD - F/U BLADDER SCAN, MONITOR CR, AVOID NEPHROTOXIC AGENTS  # COPD  # HYPOTHYROIDISM  # MORBID OBESITY  # CHRONIC VENOUS INSUFFICIENCY, HX OF LEFT DVT  # F/U PT EVAL  # GI AND DVT PPX    RAISA STORY MD COVERING TACOS STORY MD

## 2021-08-30 NOTE — PATIENT PROFILE ADULT - BRAND OF COVID-19 VACCINATION
patient states that he got 2 doses in February and March but cannot recall which brand patient states that he got 2 doses in February and March but cannot recall which brand/Pfizer dose 1 and 2

## 2021-08-30 NOTE — H&P ADULT - PROBLEM SELECTOR PLAN 9
IMPROVE VTE Individual Risk Assessment  RISK                                                                Points  [  ] Previous VTE                                                  3  [  ] Thrombophilia                                               2  [  ] Lower limb paralysis                                      2        (unable to hold up >15 seconds)    [  ] Current Cancer                                              2         (within 6 months)  [  ] Immobilization > 24 hrs                                1  [  ] ICU/CCU stay > 24 hours                              1  [  ] Age > 60                                                      1  IMPROVE VTE Score ____2_____    PPI for GI prophylaxis  Heparin for DVT PPX - Patient has hx of BPH on Tamsulosin, Finasteride at home   - C/w home meds

## 2021-08-30 NOTE — PATIENT PROFILE ADULT - NS PRO AD PATIENT TYPE
not indicated on transfer paperwork/Health Care Proxy (HCP)/Living Will not indicated on transfer paperwork/Living Will

## 2021-08-30 NOTE — H&P ADULT - NSICDXPASTMEDICALHX_GEN_ALL_CORE_FT
PAST MEDICAL HISTORY:  Ambulatory dysfunction     Anemia     CKD (chronic kidney disease)     History of BPH     Hyperlipidemia     Hypothyroid     Schizoaffective disorder     Schizophrenia      PAST MEDICAL HISTORY:  Ambulatory dysfunction     Anemia     Bipolar disorder     Chronic obstructive pulmonary disease (COPD)     CKD (chronic kidney disease)     History of BPH     Hyperlipidemia     Hypothyroid

## 2021-08-30 NOTE — H&P ADULT - PROBLEM SELECTOR PLAN 2
- - On admission, Patient with elevated troponin 0.2, Pro BNP 3k compared to 4k on last admission in 2019, no chest pain or sob  - Elevated troponin most likely demand ischemia 2/2 SHEYLA on CKD (worsening CKD)  - CXR unremarkable, no signs of pulmonary congestion, cardiomegaly   - F/u echo, no previous echo found   - F/u orthostatics   - Admitted to telemetry   - Cardio Dr Javier consulted - On admission, Patient with elevated troponin 0.20 >0.17, Pro BNP 3k compared to 4k on last admission in 2019, no chest pain or sob  - Elevated troponin most likely demand ischemia 2/2 SHEYLA on CKD (worsening CKD)  - CXR unremarkable, no signs of pulmonary congestion, cardiomegaly   - F/u echo, no previous echo found   - F/u orthostatics   - Admitted to telemetry   - Cardio Dr Javier consulted - On admission, Patient with elevated troponin 0.20 >0.17, Pro BNP 3k compared to 4k on last admission in 2019, no chest pain or sob  - Elevated troponin most likely demand ischemia 2/2 SHEYLA on CKD (worsening CKD)  - CXR unremarkable, no signs of pulmonary congestion, cardiomegaly   - F/u echo, no previous echo found   - F/u orthostatics and troponin  - Admitted to telemetry   - Cardio Dr Javier consulted - On admission, Patient with elevated troponin 0.20 >0.17, Pro BNP 3k compared to 4k on last admission in 2019, no chest pain or sob  - Elevated troponin most likely demand ischemia 2/2 SHEYLA on CKD (worsening CKD)  - EKG RBBB, no acute changes  - CXR unremarkable, no signs of pulmonary congestion, cardiomegaly   - F/u echo, no previous echo found   - F/u orthostatics and troponin  - Admitted to telemetry   - Cardio Dr Javier consulted

## 2021-08-30 NOTE — ED PROVIDER NOTE - OBJECTIVE STATEMENT
61yoM with h/o COPD, CHF, CAD, anemia, bipolar, psychosis, presents from [facility] assisted living with shakiness. Pt reports this morning trying to get out of bed he felt lightheaded, improved with sitting down, then when he went to walk he was feeling shaky. He reports chronic R leg muscle problem and L leg arthritis which have decreased his mobility in the past and this is why he is assisted living, but x3-4 days now feeling more generalized LE weakness. Per EMS he has slurred speech at baseline (and pt confirms this present since childhood) and was possibly slightly more today and unsteady gait, noted to have FS 68 and improved a little after 3 orange juices. No h/o DM. Denies CP, SOB, change in baseline LE edema, fever, v/d, black or bloody stool, dysuria, abd pain, back pain, and all other symptoms.

## 2021-08-30 NOTE — H&P ADULT - PROBLEM SELECTOR PLAN 8
Discharge Note  Short Stay      SUMMARY     Admit Date: 7/31/2019    Attending Physician: Dr Shell      Discharge Physician: CAMILO Barahona      Discharge Date: 7/31/2019 8:52 AM    Procedure(s) (LRB):  INJECTION, STEROID, EPIDURAL, TRANSFORAMINAL APPROACH, ARMINDA L4-L5 Pt. cleared to hold Plavix 7 days and Pletal 3 days (Bilateral)    Final Diagnosis: Lumbar radiculopathy [M54.16]  DDD (degenerative disc disease), lumbar [M51.36]    Disposition: Home or self care    Patient Instructions:   Current Discharge Medication List      CONTINUE these medications which have NOT CHANGED    Details   aspirin (ASPIR-81) 81 MG EC tablet Take 81 mg by mouth.      cilostazol (PLETAL) 100 MG Tab       clopidogrel (PLAVIX) 75 mg tablet clopidogrel 75 mg tablet      !! eplerenone (INSPRA) 25 MG Tab       !! eplerenone (INSPRA) 25 MG Tab eplerenone 25 mg tablet      fluticasone propionate (FLONASE) 50 mcg/actuation nasal spray fluticasone propionate 50 mcg/actuation nasal spray,suspension      furosemide (LASIX) 40 MG tablet Take 40 mg by mouth 2 (two) times daily.      gabapentin (NEURONTIN) 300 MG capsule gabapentin 300 mg capsule   TAKE 1 CAPSULE BY MOUTH THREE TIMES A DAY AS NEEDED      glimepiride (AMARYL) 2 MG tablet glimepiride 2 mg tablet      metoprolol tartrate (LOPRESSOR) 50 MG tablet metoprolol tartrate 50 mg tablet      pravastatin (PRAVACHOL) 80 MG tablet pravastatin 80 mg tablet      traMADol (ULTRAM) 50 mg tablet tramadol 50 mg tablet       !! - Potential duplicate medications found. Please discuss with provider.              Discharge Diagnosis: Lumbar radiculopathy [M54.16]  DDD (degenerative disc disease), lumbar [M51.36]  Condition on Discharge: Stable with no complications to procedure   Diet on Discharge: Same as before.  Activity: as per instruction sheet.  Discharge to: Home with a responsible adult.  Follow up: 2-4 weeks    Please call my office or pager at [phone] if experienced any weakness or loss of  sensation, fever > 101.5, pain uncontrolled with oral medications, persistent nausea/vomiting/or diarrhea, redness or drainage from the incisions, or any other worrisome concerns. If physician on call was not reached or could not communicate with our office for any reason please go to the nearest emergency department      - Patient with hx of hypothyroidism on Synthroid at home   - C/w home meds  - F/u TSH and adjust medications if needed

## 2021-08-30 NOTE — H&P ADULT - PROBLEM SELECTOR PLAN 1
- On admission, patient with Cr XXX, unknown baseline XX  - SHEYLA most likely pre renal in the setting of acute infection, poor oral intake, GI losses, diuretic use   - SHEYLA most likely post renal 2/2 BPH, kidney stones   - F/u urinalysis, urine lytes (urine sodium, urine creatinine, urine urea), urine protein/urine creatinine ratio   - Monitor I/O's daily  - Avoid nephrotoxins, NSAIDS, ACEI and ARBS  - Started on IVF cc/hr x 24 hrs  - Monitor BMP daily  - Nephro Dr consulted  - Consider Nephro consult in the am - On admission, patient with Cr 3.8, baseline 3.1  - SHEYLA most likely pre renal in the setting of worsening LE lymphedema from medication non compliance?  - F/u urine lytes (urine sodium, urine creatinine, urine urea)  - Monitor I/O's daily  - Avoid nephrotoxins, NSAIDS, ACEI and ARBS  - Will order one dose Lasix 40 mg IVP to help with diuresis   - Strict I and O, daily weight  - Monitor BMP daily  - Consider Nephro consult in the am if Creatinine worsens

## 2021-08-30 NOTE — H&P ADULT - PROBLEM SELECTOR PLAN 5
- Patient with known lymphedema and PVD  - Will order one dose of Lasix 40 mg IVP to help with diuresis  - Monitor I and O, weight daily   - Diet fluid restriction 1L

## 2021-08-30 NOTE — ED ADULT NURSE NOTE - OBJECTIVE STATEMENT
Patient present to ED sent from [facility] for shaking this morning with worsen weakness with chronic right leg pain . On EMS arrival FS 68 once in .

## 2021-08-30 NOTE — H&P ADULT - NSHPPHYSICALEXAM_GEN_ALL_CORE
ICU Vital Signs Last 24 Hrs  T(C): 36.4 (30 Aug 2021 15:20), Max: 37 (30 Aug 2021 11:00)  T(F): 97.6 (30 Aug 2021 15:20), Max: 98.6 (30 Aug 2021 11:00)  HR: 87 (30 Aug 2021 15:20) (74 - 87)  BP: 111/70 (30 Aug 2021 15:20) (111/70 - 122/71)  RR: 18 (30 Aug 2021 15:20) (16 - 19)  SpO2: 87% (30 Aug 2021 15:20) (87% - 99%)    GENERAL: NAD, morbid obese, sat well on 1L NC  HEAD:  Atraumatic, Normocephalic  EYES:  conjunctiva and sclera clear  NECK: Supple, No JVD, Normal thyroid  CHEST/LUNG: Clear to auscultation. Clear to percussion bilaterally; No rales, rhonchi, wheezing, or rubs  HEART: Regular rate and rhythm; No murmurs, rubs, or gallops  ABDOMEN: Abundant fat tissue, Soft, Nontender, Nondistended; Bowel sounds present, no pain or masses on palpation   NERVOUS SYSTEM:  Alert & Oriented X3  EXTREMITIES:  2+ Peripheral Pulses, No clubbing or cyanosis. Bilateral LE chronic edema, PVD skin changes   : voiding well   SKIN: warm, dry

## 2021-08-30 NOTE — ED PROVIDER NOTE - PHYSICAL EXAMINATION
Afebrile, hemodynamically stable, saturating well on RA  NAD, well appearing, sitting comfortably in bed, no WOB  Head NCAT  EOMI grossly, anicteric  MMM  No JVD  RRR, nml S1/S2, no m/r/g  Lungs CTAB, no w/r/r  Abd soft, NT, ND, nml BS, no rebound or guarding  AAO, CN's 3-12 intact, motor 4/5 in b/l lower extrems, 5/5 and sens symm in upper extrems, no dysarthria  BENDER spontaneously, b/l symm LE edema  Skin warm, dry

## 2021-08-30 NOTE — H&P ADULT - NSHPREVIEWOFSYSTEMS_GEN_ALL_CORE
REVIEW OF SYSTEMS:  CONSTITUTIONAL: No fever, weight loss, or fatigue  RESPIRATORY: No cough, wheezing, chills or hemoptysis; No shortness of breath  CARDIOVASCULAR: No chest pain, or palpitations, dizziness+,  leg swelling+  GASTROINTESTINAL: No abdominal pain. No nausea, vomiting, or hematemesis; No diarrhea or constipation. No melena or hematochezia.  GENITOURINARY: No dysuria or hematuria, urinary frequency  NEUROLOGICAL: No headaches, memory loss, loss of strength, numbness, or tremors  SKIN: No itching, burning, rashes, or lesions

## 2021-08-30 NOTE — H&P ADULT - HISTORY OF PRESENT ILLNESS
61 year old man from [facility] [place] ambulates w/ walker has past medical hx of hypothyroidism, anemia, CKD, BPH, HLD, schizophrenia, schizoaffective disorder, ambulatory dysfunction, lymphedema was BIBEMS as patient was complaining of worsening lower extremity weakness, lightheaded and overall not feeling well. Of note, patient has slurred speech at baseline since childhood, known ambulatory dysfunction. Patient denies chest pain, palpitations, sob.       [facility] full code  61 year old man with morbid obesity from [facility] [place] ambulates w/ walker has past medical hx of hypothyroidism, anemia, left DVT, CKD, BPH, HLD, bipolar disorder, ambulatory dysfunction, lymphedema was BIBEMS as patient was complaining of worsening lower extremity weakness, lightheaded and overall not feeling well. Of note, patient has slurred speech at baseline since childhood, known ambulatory dysfunction. Patient denies chest pain, palpitations, sob or other symptoms.      GOC full code  61 year old man with morbid obesity from [facility] [place] ambulates w/ walker has past medical hx of COPD not on O2 at home, hypothyroidism, anemia, left DVT, CKD, BPH, HLD, bipolar disorder, ambulatory dysfunction, lymphedema was BIBEMS as patient was complaining of worsening lower extremity weakness, lightheaded and overall not feeling well for one day. Of note, patient with slurred speech at baseline since childhood and known ambulatory dysfunction reason why he is on IRA. Patient denies chest pain, palpitations, sob or other symptoms.      GOC full code  61 year old man with morbid obesity from [facility] [place] ambulates w/ walker has past medical hx of COPD not on O2 at home, hypothyroidism, anemia, left DVT, CKD, BPH, HLD, bipolar disorder, ambulatory dysfunction, lymphedema was BIBEMS as patient was complaining of worsening lower extremity weakness, lightheaded and overall not feeling well for one day. Of note, patient with slurred speech at baseline since childhood and known ambulatory dysfunction reason why he is on IRA. Patient denies chest pain, palpitations, sob or other symptoms.      GOC full code

## 2021-08-30 NOTE — H&P ADULT - PROBLEM SELECTOR PLAN 3
- Patient with known ambulatory dysfunction, walks with walker  - Most likely worsening lymphedema and morbid obesity   - Nutritionist consulted  - Fall precautions   - PT consulted - Patient with known ambulatory dysfunction, walks with walker  - Most likely worsening lymphedema and morbid obesity   - CT head unremarkable for acute process   - Doppler LE -ve for DVT  - Nutritionist consulted  - Fall precautions   - PT consulted

## 2021-08-30 NOTE — H&P ADULT - PROBLEM SELECTOR PLAN 10
IMPROVE VTE Individual Risk Assessment  RISK                                                                Points  [  ] Previous VTE                                                  3  [  ] Thrombophilia                                               2  [  ] Lower limb paralysis                                      2        (unable to hold up >15 seconds)    [  ] Current Cancer                                              2         (within 6 months)  [  ] Immobilization > 24 hrs                                1  [  ] ICU/CCU stay > 24 hours                              1  [  ] Age > 60                                                      1  IMPROVE VTE Score ____2_____    PPI for GI prophylaxis  Heparin for DVT PPX

## 2021-08-30 NOTE — H&P ADULT - ASSESSMENT
61 year old man with extensive medical hx mentioned above was BIBEMS for worsening lower extremity weakness, lightheadedness and overall not feeling well. Patient admitted for SHEYLA on CKD and elevated troponin

## 2021-08-30 NOTE — H&P ADULT - PROBLEM SELECTOR PLAN 7
- Patient with hx of anemia of chronic disease on Ferrous sulfate at home  - C/w home meds  - Monitor CBC daily

## 2021-08-30 NOTE — ED PROVIDER NOTE - CLINICAL SUMMARY MEDICAL DECISION MAKING FREE TEXT BOX
Pt with chronic b/l extrem pain and weakness and no back pain or signs of cauda equina. B/l PVD with low suspicion for CHF exacerbation or DVT. No SOB or e/o pulm edema, PNA, PTX. No fever or specific infectious signs. No focal deficits and character low suspicion for CVA, symptoms appear to more c/w generalized weakness rather than CVA. lytes anemia Pt with chronic b/l extrem pain and weakness and no back pain or signs of cauda equina. B/l PVD with low suspicion for CHF exacerbation or DVT. No SOB or e/o pulm edema, PNA, PTX - BNP elevated but lower than last time documented in 2019. No fever or specific infectious signs. No focal deficits and character low suspicion for CVA, symptoms appear to more c/w generalized weakness rather than CVA. Mild SHEYLA. No gross electrolyte abnormality, no worsening anemia and no active bleeding. Noted elevated trop, no CP/SOB and low suspicion for ACS or PE. Ordered b/l dopplers for completion, which Dr. Parker [facility]NA will follow. Patient well appearing, hemodynamically stable. Given ASA. Admitted to internal medicine for further monitoring, w/u, and care.

## 2021-08-31 LAB
A1C WITH ESTIMATED AVERAGE GLUCOSE RESULT: 5.1 % — SIGNIFICANT CHANGE UP (ref 4–5.6)
ANION GAP SERPL CALC-SCNC: 4 MMOL/L — LOW (ref 5–17)
BASE EXCESS BLDA CALC-SCNC: -4.5 MMOL/L — LOW (ref -2–3)
BASE EXCESS BLDA CALC-SCNC: -5 MMOL/L — LOW (ref -2–3)
BLOOD GAS COMMENTS ARTERIAL: SIGNIFICANT CHANGE UP
BLOOD GAS COMMENTS ARTERIAL: SIGNIFICANT CHANGE UP
BUN SERPL-MCNC: 43 MG/DL — HIGH (ref 7–18)
CALCIUM SERPL-MCNC: 8.9 MG/DL — SIGNIFICANT CHANGE UP (ref 8.4–10.5)
CHLORIDE SERPL-SCNC: 108 MMOL/L — SIGNIFICANT CHANGE UP (ref 96–108)
CHOLEST SERPL-MCNC: 152 MG/DL — SIGNIFICANT CHANGE UP
CO2 SERPL-SCNC: 28 MMOL/L — SIGNIFICANT CHANGE UP (ref 22–31)
COVID-19 SPIKE DOMAIN AB INTERP: POSITIVE
COVID-19 SPIKE DOMAIN ANTIBODY RESULT: >250 U/ML — HIGH
CREAT SERPL-MCNC: 4.01 MG/DL — HIGH (ref 0.5–1.3)
CULTURE RESULTS: SIGNIFICANT CHANGE UP
D DIMER BLD IA.RAPID-MCNC: 217 NG/ML DDU — SIGNIFICANT CHANGE UP
ESTIMATED AVERAGE GLUCOSE: 100 MG/DL — SIGNIFICANT CHANGE UP (ref 68–114)
GLUCOSE BLDC GLUCOMTR-MCNC: 126 MG/DL — HIGH (ref 70–99)
GLUCOSE BLDC GLUCOMTR-MCNC: 138 MG/DL — HIGH (ref 70–99)
GLUCOSE BLDC GLUCOMTR-MCNC: 146 MG/DL — HIGH (ref 70–99)
GLUCOSE SERPL-MCNC: 102 MG/DL — HIGH (ref 70–99)
HCO3 BLDA-SCNC: 27 MMOL/L — SIGNIFICANT CHANGE UP (ref 21–28)
HCO3 BLDA-SCNC: 28 MMOL/L — SIGNIFICANT CHANGE UP (ref 21–28)
HCT VFR BLD CALC: 36.5 % — LOW (ref 39–50)
HDLC SERPL-MCNC: 60 MG/DL — SIGNIFICANT CHANGE UP
HGB BLD-MCNC: 11 G/DL — LOW (ref 13–17)
HOROWITZ INDEX BLDA+IHG-RTO: 100 — SIGNIFICANT CHANGE UP
HOROWITZ INDEX BLDA+IHG-RTO: 40 — SIGNIFICANT CHANGE UP
LIPID PNL WITH DIRECT LDL SERPL: 76 MG/DL — SIGNIFICANT CHANGE UP
MAGNESIUM SERPL-MCNC: 2.5 MG/DL — SIGNIFICANT CHANGE UP (ref 1.6–2.6)
MCHC RBC-ENTMCNC: 30.1 GM/DL — LOW (ref 32–36)
MCHC RBC-ENTMCNC: 31.6 PG — SIGNIFICANT CHANGE UP (ref 27–34)
MCV RBC AUTO: 104.9 FL — HIGH (ref 80–100)
NON HDL CHOLESTEROL: 92 MG/DL — SIGNIFICANT CHANGE UP
NRBC # BLD: 0 /100 WBCS — SIGNIFICANT CHANGE UP (ref 0–0)
PCO2 BLDA: 101 MMHG — CRITICAL HIGH (ref 35–48)
PCO2 BLDA: 89 MMHG — CRITICAL HIGH (ref 35–48)
PH BLDA: 7.05 — CRITICAL LOW (ref 7.35–7.45)
PH BLDA: 7.09 — CRITICAL LOW (ref 7.35–7.45)
PHOSPHATE SERPL-MCNC: 6.8 MG/DL — HIGH (ref 2.5–4.5)
PLATELET # BLD AUTO: 235 K/UL — SIGNIFICANT CHANGE UP (ref 150–400)
PO2 BLDA: 66 MMHG — LOW (ref 83–108)
PO2 BLDA: 86 MMHG — SIGNIFICANT CHANGE UP (ref 83–108)
POTASSIUM SERPL-MCNC: 4.9 MMOL/L — SIGNIFICANT CHANGE UP (ref 3.5–5.3)
POTASSIUM SERPL-SCNC: 4.9 MMOL/L — SIGNIFICANT CHANGE UP (ref 3.5–5.3)
RBC # BLD: 3.48 M/UL — LOW (ref 4.2–5.8)
RBC # FLD: 14.1 % — SIGNIFICANT CHANGE UP (ref 10.3–14.5)
SAO2 % BLDA: 93 % — SIGNIFICANT CHANGE UP
SAO2 % BLDA: 97 % — SIGNIFICANT CHANGE UP
SARS-COV-2 IGG+IGM SERPL QL IA: >250 U/ML — HIGH
SARS-COV-2 IGG+IGM SERPL QL IA: POSITIVE
SODIUM SERPL-SCNC: 140 MMOL/L — SIGNIFICANT CHANGE UP (ref 135–145)
SPECIMEN SOURCE: SIGNIFICANT CHANGE UP
TRIGL SERPL-MCNC: 80 MG/DL — SIGNIFICANT CHANGE UP
TSH SERPL-MCNC: 1.24 UU/ML — SIGNIFICANT CHANGE UP (ref 0.34–4.82)
WBC # BLD: 7.62 K/UL — SIGNIFICANT CHANGE UP (ref 3.8–10.5)
WBC # FLD AUTO: 7.62 K/UL — SIGNIFICANT CHANGE UP (ref 3.8–10.5)

## 2021-08-31 PROCEDURE — 99291 CRITICAL CARE FIRST HOUR: CPT

## 2021-08-31 PROCEDURE — 93306 TTE W/DOPPLER COMPLETE: CPT | Mod: 26

## 2021-08-31 RX ORDER — PHENYLEPHRINE HYDROCHLORIDE 10 MG/ML
0.1 INJECTION INTRAVENOUS
Qty: 40 | Refills: 0 | Status: DISCONTINUED | OUTPATIENT
Start: 2021-08-31 | End: 2021-09-01

## 2021-08-31 RX ORDER — SODIUM CHLORIDE 9 MG/ML
500 INJECTION INTRAMUSCULAR; INTRAVENOUS; SUBCUTANEOUS ONCE
Refills: 0 | Status: COMPLETED | OUTPATIENT
Start: 2021-08-31 | End: 2021-08-31

## 2021-08-31 RX ORDER — NALOXONE HYDROCHLORIDE 4 MG/.1ML
0.4 SPRAY NASAL ONCE
Refills: 0 | Status: COMPLETED | OUTPATIENT
Start: 2021-08-31 | End: 2021-08-31

## 2021-08-31 RX ORDER — SEVELAMER CARBONATE 2400 MG/1
800 POWDER, FOR SUSPENSION ORAL
Refills: 0 | Status: DISCONTINUED | OUTPATIENT
Start: 2021-08-31 | End: 2021-09-08

## 2021-08-31 RX ORDER — FUROSEMIDE 40 MG
40 TABLET ORAL ONCE
Refills: 0 | Status: COMPLETED | OUTPATIENT
Start: 2021-08-31 | End: 2021-08-31

## 2021-08-31 RX ORDER — SODIUM CHLORIDE 9 MG/ML
1000 INJECTION INTRAMUSCULAR; INTRAVENOUS; SUBCUTANEOUS
Refills: 0 | Status: DISCONTINUED | OUTPATIENT
Start: 2021-08-31 | End: 2021-09-01

## 2021-08-31 RX ADMIN — BUPROPION HYDROCHLORIDE 150 MILLIGRAM(S): 150 TABLET, EXTENDED RELEASE ORAL at 12:22

## 2021-08-31 RX ADMIN — HYDROMORPHONE HYDROCHLORIDE 4 MILLIGRAM(S): 2 INJECTION INTRAMUSCULAR; INTRAVENOUS; SUBCUTANEOUS at 13:48

## 2021-08-31 RX ADMIN — PANTOPRAZOLE SODIUM 40 MILLIGRAM(S): 20 TABLET, DELAYED RELEASE ORAL at 06:45

## 2021-08-31 RX ADMIN — ALBUTEROL 2 PUFF(S): 90 AEROSOL, METERED ORAL at 12:22

## 2021-08-31 RX ADMIN — POLYETHYLENE GLYCOL 3350 17 GRAM(S): 17 POWDER, FOR SOLUTION ORAL at 12:23

## 2021-08-31 RX ADMIN — Medication 325 MILLIGRAM(S): at 12:22

## 2021-08-31 RX ADMIN — BUDESONIDE AND FORMOTEROL FUMARATE DIHYDRATE 2 PUFF(S): 160; 4.5 AEROSOL RESPIRATORY (INHALATION) at 12:22

## 2021-08-31 RX ADMIN — HYDROMORPHONE HYDROCHLORIDE 4 MILLIGRAM(S): 2 INJECTION INTRAMUSCULAR; INTRAVENOUS; SUBCUTANEOUS at 15:57

## 2021-08-31 RX ADMIN — BUDESONIDE AND FORMOTEROL FUMARATE DIHYDRATE 2 PUFF(S): 160; 4.5 AEROSOL RESPIRATORY (INHALATION) at 00:30

## 2021-08-31 RX ADMIN — FINASTERIDE 5 MILLIGRAM(S): 5 TABLET, FILM COATED ORAL at 12:23

## 2021-08-31 RX ADMIN — DIVALPROEX SODIUM 500 MILLIGRAM(S): 500 TABLET, DELAYED RELEASE ORAL at 17:31

## 2021-08-31 RX ADMIN — BUDESONIDE AND FORMOTEROL FUMARATE DIHYDRATE 2 PUFF(S): 160; 4.5 AEROSOL RESPIRATORY (INHALATION) at 23:01

## 2021-08-31 RX ADMIN — Medication 10 MILLIGRAM(S): at 06:45

## 2021-08-31 RX ADMIN — Medication 81 MILLIGRAM(S): at 12:22

## 2021-08-31 RX ADMIN — DIVALPROEX SODIUM 500 MILLIGRAM(S): 500 TABLET, DELAYED RELEASE ORAL at 08:37

## 2021-08-31 RX ADMIN — ALBUTEROL 2 PUFF(S): 90 AEROSOL, METERED ORAL at 08:38

## 2021-08-31 RX ADMIN — Medication 40 MILLIGRAM(S): at 12:22

## 2021-08-31 RX ADMIN — PHENYLEPHRINE HYDROCHLORIDE 5.07 MICROGRAM(S)/KG/MIN: 10 INJECTION INTRAVENOUS at 23:47

## 2021-08-31 RX ADMIN — Medication 125 MICROGRAM(S): at 06:45

## 2021-08-31 RX ADMIN — Medication 1000 UNIT(S): at 12:23

## 2021-08-31 RX ADMIN — SEVELAMER CARBONATE 800 MILLIGRAM(S): 2400 POWDER, FOR SUSPENSION ORAL at 18:19

## 2021-08-31 RX ADMIN — SODIUM CHLORIDE 70 MILLILITER(S): 9 INJECTION INTRAMUSCULAR; INTRAVENOUS; SUBCUTANEOUS at 23:00

## 2021-08-31 RX ADMIN — NALOXONE HYDROCHLORIDE 0.4 MILLIGRAM(S): 4 SPRAY NASAL at 21:18

## 2021-08-31 RX ADMIN — NALOXONE HYDROCHLORIDE 0.4 MILLIGRAM(S): 4 SPRAY NASAL at 20:55

## 2021-08-31 RX ADMIN — ALBUTEROL 2 PUFF(S): 90 AEROSOL, METERED ORAL at 17:31

## 2021-08-31 RX ADMIN — HEPARIN SODIUM 5000 UNIT(S): 5000 INJECTION INTRAVENOUS; SUBCUTANEOUS at 22:59

## 2021-08-31 RX ADMIN — HEPARIN SODIUM 5000 UNIT(S): 5000 INJECTION INTRAVENOUS; SUBCUTANEOUS at 06:45

## 2021-08-31 RX ADMIN — Medication 1 MILLIGRAM(S): at 12:23

## 2021-08-31 RX ADMIN — SODIUM CHLORIDE 500 MILLILITER(S): 9 INJECTION INTRAMUSCULAR; INTRAVENOUS; SUBCUTANEOUS at 23:01

## 2021-08-31 RX ADMIN — HEPARIN SODIUM 5000 UNIT(S): 5000 INJECTION INTRAVENOUS; SUBCUTANEOUS at 13:50

## 2021-08-31 RX ADMIN — Medication 10 MILLIGRAM(S): at 17:32

## 2021-08-31 NOTE — DIETITIAN INITIAL EVALUATION ADULT. - PROBLEM SELECTOR PLAN 8
- Patient with hx of hypothyroidism on Synthroid at home   - C/w home meds  - F/u TSH and adjust medications if needed

## 2021-08-31 NOTE — RAPID RESPONSE TEAM SUMMARY - NSSITUATIONBACKGROUNDRRT_GEN_ALL_CORE
61 year old man with morbid obesity from [facility] [place] ambulates w/ walker has past medical hx of COPD not on O2 at home, hypothyroidism, anemia, left DVT, CKD, BPH, HLD, bipolar disorder, ambulatory dysfunction, lymphedema was BIBEMS as patient was complaining of worsening lower extremity weakness, lightheaded and overall not feeling well for one day. Of note, patient with slurred speech at baseline since childhood and known ambulatory dysfunction.   On 8/31, pt was noted to be increasingly lethargic and drowsy and ws hypotensive as well. Rapid response team was called. Patient was only opening eyes to painful stimuli. Pinpoint pupils were noted. chart review showed he received Dilaudid 4mg PO in the afternoon. Narcan 0.4mg was given and pt opened eyes.   ABG shows PCO2 of 100, patient is placed on BIPAP and will be taken to ICU   Attending informed

## 2021-08-31 NOTE — CONSULT NOTE ADULT - SUBJECTIVE AND OBJECTIVE BOX
NEPHROLOGY MEDICAL CARE, [facility] - Dr. Taj Valenzuela/ Dr. Hazel Solis/ Dr. Cosmo Root/ Dr. Fang Gifford    Patient was seen and examined at bedside.     Consultation requested by:  Owen Phillips    Reason for Consult: SHEYLA on CKD    HPI:  61 year old man with morbid obesity from [facility] [place] ambulates w/ walker has past medical hx of COPD not on O2 at home, hypothyroidism, anemia, left DVT, CKD, BPH, HLD, bipolar disorder, ambulatory dysfunction, lymphedema was BIBEMS as patient was complaining of worsening lower extremity weakness, lightheaded and overall not feeling well for one day. Of note, patient with slurred speech at baseline since childhood and known ambulatory dysfunction reason why he is on IRA. Renal consulted for SHEYLA on CKD and pt is poor historian. Patient was admitted with scr around 3.8mg/dL and slowly worsening to 4.0s. Previous scr was around 3.1mg/dL in 2019. Patient did not receive fluids in the Er and patient received lasix 40mg iv once since clinically hypervolemic state. Patient had daley's cath placed in the Er and not sure if was retention.  Patient did not receive IV contrast during hospital stay. Patient denies chest pain, palpitations, sob or other symptoms.      GOC full code         PMH:   Hypothyroid    Hyperlipidemia    Schizophrenia    Schizoaffective disorder    Ambulatory dysfunction    Anemia    History of BPH    CKD (chronic kidney disease)    Chronic obstructive pulmonary disease (COPD)    Bipolar disorder    Bipolar disorder        PSH:   No significant past surgical history        FAMILY HISTORY:      Social History:  non-smoker/ non-alcoholic     Home Meds:  Home Medications:  aspirin 81 mg oral tablet: 1 tab(s) orally once a day (30 Aug 2021 18:01)  buPROPion 75 mg oral tablet: 1 tab(s) orally 2 times a day (30 Aug 2021 18:01)  busPIRone 10 mg oral tablet: 1 tab(s) orally 2 times a day (30 Aug 2021 18:01)  Dilaudid 4 mg oral tablet: 1 tab(s) orally once a day (30 Aug 2021 18:01)  divalproex sodium 500 mg oral tablet, extended release: 1 tab(s) orally 2 times a day (30 Aug 2021 18:01)  folic acid 1 mg oral tablet: 1 tab(s) orally once a day (30 Aug 2021 18:01)  Milk of Magnesia 8% oral suspension: 30 milliliter(s) orally once a day M W F (30 Aug 2021 18:01)  ProAir HFA 90 mcg/inh inhalation aerosol: 2 puff(s) inhaled every 8 hours (30 Aug 2021 18:01)  Proscar 5 mg oral tablet: 1 tab(s) orally once a day (30 Aug 2021 18:01)  RisperDAL 2 mg oral tablet: 1 tab(s) orally once a day 8pm (30 Aug 2021 18:01)  Senna 8.6 mg oral tablet: 2 tab(s) orally once a day (at bedtime) (30 Aug 2021 18:01)  Simbrinza 1%- 0.2% ophthalmic suspension: 1 drop(s) to each affected eye 2 times a day Right eye (30 Aug 2021 18:01)  Symbicort 160 mcg-4.5 mcg/inh inhalation aerosol: 2 puff(s) inhaled 2 times a day (30 Aug 2021 18:01)  Synthroid 125 mcg (0.125 mg) oral tablet: 1 tab(s) orally once a day (30 Aug 2021 18:01)  tamsulosin 0.4 mg oral capsule: 1 cap(s) orally once a day (30 Aug 2021 18:01)  Tylenol 325 mg oral tablet: 2 tab(s) orally 2 times a day (30 Aug 2021 18:01)  Vitamin D3 25 mcg (1000 intl units) oral tablet: 1 tab(s) orally once a day (30 Aug 2021 18:01)  Zocor 20 mg oral tablet: 1 tab(s) orally once a day (at bedtime) (30 Aug 2021 18:01)      Allergies:  Allergies    No Known Allergies    Intolerances        REVIEW OF SYSTEMS:  poor historian    Vital Signs Last 24 Hrs  T(C): 37.7 (31 Aug 2021 15:18), Max: 37.7 (31 Aug 2021 15:18)  T(F): 99.8 (31 Aug 2021 15:18), Max: 99.8 (31 Aug 2021 15:18)  HR: 93 (31 Aug 2021 15:18) (86 - 93)  BP: 122/66 (31 Aug 2021 15:18) (108/63 - 139/63)  BP(mean): --  RR: 18 (31 Aug 2021 15:18) (18 - 19)  SpO2: 98% (31 Aug 2021 15:18) (96% - 99%)    0830 @ 07: @ 07:00  --------------------------------------------------------  IN: 0 mL / OUT: 900 mL / NET: -900 mL     @ 07: @ 16:31  --------------------------------------------------------  IN: 0 mL / OUT: 800 mL / NET: -800 mL          PHYSICAL EXAM:  General: No acute respiratory distress; obese  Eyes: conjunctiva and sclera clear  ENMT: Atraumatic, Normocephalic, supple, No JVD present. Moist mucous membranes  Respiratory: b/l poor air entry; No rales, rhonchi, wheezing  Cardiovasular: S1S2+; no m/r/g  Gastrointestinal: Soft, Non-tender, Nondistended; Bowel sounds present, no hepatosplenomegaly.   : daley's cath with yellowish urine.  Neuro:  Awake, Alert & Oriented X3, No focal deficits present.   Ext:  2+ Peripheral Pulses and 2+ pedal edema with chronic venous stasis; No Cyanosis  Skin: chronic vascular changes of the b/l legs.        LABS:                        11.0   7.62  )-----------( 235      ( 31 Aug 2021 08:02 )             36.5         140  |  108  |  43<H>  ----------------------------<  102<H>  4.9   |  28  |  4.01<H>    Ca    8.9      31 Aug 2021 08:02  Phos  6.8       Mg     2.5         TPro  7.4  /  Alb  2.7<L>  /  TBili  0.2  /  DBili  x   /  AST  8<L>  /  ALT  12  /  AlkPhos  98        Urinalysis Basic - ( 30 Aug 2021 18:47 )    Color: Yellow / Appearance: Clear / S.015 / pH: x  Gluc: x / Ketone: Negative  / Bili: Negative / Urobili: Negative   Blood: x / Protein: 100 / Nitrite: Negative   Leuk Esterase: Negative / RBC: 0-2 /HPF / WBC 3-5 /HPF   Sq Epi: x / Non Sq Epi: Occasional /HPF / Bacteria: Trace /HPF      Magnesium, Serum: 2.5 mg/dL ( @ 08:02)  Phosphorus Level, Serum: 6.8 mg/dL *H* ( @ 08:02)    Urine studies      Medications:  MEDICATIONS  (STANDING):  ALBUTerol    90 MICROgram(s) HFA Inhaler 2 Puff(s) Inhalation <User Schedule>  aspirin enteric coated 81 milliGRAM(s) Oral daily  budesonide 160 MICROgram(s)/formoterol 4.5 MICROgram(s) Inhaler 2 Puff(s) Inhalation two times a day  buPROPion XL (24-Hour) . 150 milliGRAM(s) Oral daily  busPIRone 10 milliGRAM(s) Oral two times a day  cholecalciferol 1000 Unit(s) Oral daily  diVALproex  milliGRAM(s) Oral <User Schedule>  ferrous    sulfate 325 milliGRAM(s) Oral daily  finasteride 5 milliGRAM(s) Oral daily  folic acid 1 milliGRAM(s) Oral daily  heparin   Injectable 5000 Unit(s) SubCutaneous every 8 hours  HYDROmorphone   Tablet 4 milliGRAM(s) Oral daily  levothyroxine 125 MICROGram(s) Oral daily  pantoprazole    Tablet 40 milliGRAM(s) Oral before breakfast  polyethylene glycol 3350 17 Gram(s) Oral daily  risperiDONE   Tablet 2 milliGRAM(s) Oral <User Schedule>  senna 2 Tablet(s) Oral at bedtime  simvastatin 20 milliGRAM(s) Oral at bedtime  tamsulosin 0.4 milliGRAM(s) Oral at bedtime    MEDICATIONS  (PRN):  acetaminophen   Tablet .. 650 milliGRAM(s) Oral every 6 hours PRN Temp greater or equal to 38C (100.4F), Mild Pain (1 - 3)

## 2021-08-31 NOTE — PROGRESS NOTE ADULT - ATTENDING COMMENTS
61 year old man with morbid obesity from [facility] [place] ambulates w/ walker has past medical hx of COPD not on O2 at home, hypothyroidism, anemia, left DVT, CKD, BPH, HLD, bipolar disorder, ambulatory dysfunction, lymphedema was BIBEMS as patient was complaining of worsening lower extremity weakness, lightheaded and overall not feeling well for one day. Of note, patient with slurred speech at baseline since childhood and known ambulatory dysfunction reason why he is on IRA. Patient denies chest pain, palpitations, sob or other symptoms.    GOC full code     # HYPOGLYCEMIA S/T POOR PO INTAKE - MONITOR BG  # ELEVATED TROPONIN - TREND, F/U ECHOCARDIOGRAM, CARDIOLOGY CONSULT  # SHEYLA ON CKD - F/U BLADDER SCAN, MONITOR CR, AVOID NEPHROTOXIC AGENTS  # COPD  # HYPOTHYROIDISM  # MORBID OBESITY  # CHRONIC VENOUS INSUFFICIENCY, HX OF LEFT DVT  # F/U PT EVAL  # GI AND DVT PPX    RAISA STORY MD COVERING TACOS STORY MD . 61 year old man with morbid obesity from [facility] [place] ambulates w/ walker has past medical hx of COPD not on O2 at home, hypothyroidism, anemia, left DVT, CKD, BPH, HLD, bipolar disorder, ambulatory dysfunction, lymphedema was BIBEMS as patient was complaining of worsening lower extremity weakness, lightheaded and overall not feeling well for one day. Of note, patient with slurred speech at baseline since childhood and known ambulatory dysfunction reason why he is on IRA. Patient denies chest pain, palpitations, sob or other symptoms.    GOC full code       # RRT FOR AMS [8/31] FOUND TO HAVE ACUTE HYPERCAPNIA - GIVEN NARCAN [PATIENT'S RESPONSIVENESS IMPROVED], NARCOTICS DISCONTINUED, TRANSFERRED TO ICU ON BIPAP  # HYPOGLYCEMIA S/T POOR PO INTAKE - MONITOR BG  # ELEVATED TROPONIN - TREND, F/U ECHOCARDIOGRAM, CARDIOLOGY CONSULT  # SHEYLA ON CKD - HATFIELD PLACED, MONITOR CR, AVOID NEPHROTOXIC AGENTS  - HOLD DIURETICS  - NEPHROLOGY CONSULT  # COPD  # HYPOTHYROIDISM  # MORBID OBESITY  # CHRONIC VENOUS INSUFFICIENCY, HX OF LEFT DVT  # F/U PT EVAL  # GI AND DVT PPX    RAISA STORY MD COVERING TACOS STORY MD .

## 2021-08-31 NOTE — DIETITIAN INITIAL EVALUATION ADULT. - OTHER INFO
Patient from [facility]. Visited pt. alert & with slow slurred speech, Patient from [facility]. Visited pt. alert & with slow slurred speech, states "eating okay", needs feeding assistance, "shaking hands", also requesting "extra fluids/soda"? explained fluid restrictions per MD orders, obtained "a few food choices", at time pt. confused, d/w PCA & flow sheet pt. consuming 90% of meals, Nephro team consulted, rt./left leg edema - non-pitting, skin intact.

## 2021-08-31 NOTE — PROGRESS NOTE ADULT - ASSESSMENT
This is a    This is a 61 year old male with PMHx of COPD, Hypothyroidism, anemia, CKD, HLD and bipolar disorder presented with weakness.

## 2021-08-31 NOTE — PROGRESS NOTE ADULT - PROBLEM SELECTOR PLAN 2
- On admission, Patient with elevated troponin 0.20 >0.17, Pro BNP 3k compared to 4k on last admission in 2019, no chest pain or sob  - Elevated troponin most likely demand ischemia 2/2 SHEYLA on CKD (worsening CKD)  - EKG RBBB, no acute changes  - CXR unremarkable, no signs of pulmonary congestion, cardiomegaly   - F/u echo, no previous echo found   - F/u orthostatics and troponin  - Admitted to telemetry   - Cardio Dr Javier consulted - On admission, Patient with elevated troponin 0.20 >0.17, Pro BNP 3k compared to 4k on last admission in 2019, no chest pain or sob  - Elevated troponin most likely demand ischemia 2/2 SHEYLA on CKD (worsening CKD)  - EKG RBBB, no acute changes  - CXR unremarkable, no signs of pulmonary congestion, cardiomegaly   - F/u echo, no previous echo found   - F/u orthostatics   - Admitted to telemetry   - Cardio Dr Javier consulted

## 2021-08-31 NOTE — CONSULT NOTE ADULT - ATTENDING COMMENTS
A/P: Acute hypercarbic respiratory failure secondary to narcotics    Plan:  Transfer to ICU  S/P NArcan x 2 and the patient became awake  ABG revealed hypercarbia  Bipap  Repeat ABG  NPO  Stop narcotics

## 2021-08-31 NOTE — DIETITIAN INITIAL EVALUATION ADULT. - PERTINENT LABORATORY DATA
08-31 Na140 mmol/L Glu 102 mg/dL<H> K+ 4.9 mmol/L Cr  4.01 mg/dL<H> BUN 43 mg/dL<H>   08-31 Phos 6.8 mg/dL<H>   08-30 Alb 2.7 g/dL<L>     08-31 Chol 152 mg/dL LDL --    HDL 60 mg/dL Trig 80 mg/dL    08-31-21 @ 13:42 HgbA1C 5.1

## 2021-08-31 NOTE — DIETITIAN INITIAL EVALUATION ADULT. - PROBLEM SELECTOR PLAN 1
- On admission, patient with Cr 3.8, baseline 3.1  - SHEYLA most likely pre renal in the setting of worsening LE lymphedema from medication non compliance?  - F/u urine lytes (urine sodium, urine creatinine, urine urea)  - Monitor I/O's daily  - Avoid nephrotoxins, NSAIDS, ACEI and ARBS  - Will order one dose Lasix 40 mg IVP to help with diuresis   - Strict I and O, daily weight  - Monitor BMP daily  - Consider Nephro consult in the am if Creatinine worsens

## 2021-08-31 NOTE — DIETITIAN INITIAL EVALUATION ADULT. - FACTORS AFF FOOD INTAKE
weakness,/difficulty chewing/difficulty with food procurement/preparation/Buddhist/ethnic/cultural/personal food preferences/other (specify) weakness, SHEYLA, bipolar disorder, COPD, CKD, anemia, lymphedema, ambulatory dysfunction, elevated troponin/difficulty chewing/difficulty feeding self/difficulty with food procurement/preparation/Anabaptist/ethnic/cultural/personal food preferences/other (specify)

## 2021-08-31 NOTE — PROGRESS NOTE ADULT - SUBJECTIVE AND OBJECTIVE BOX
PGY-1 Progress Note discussed with attending    PAGER #: [[phone]] TILL 5:00 PM  PLEASE CONTACT ON CALL TEAM:  - On Call Team (Please refer to Yodit) FROM 5:00 PM - 8:30PM  - Nightfloat Team FROM 8:30 -7:30 AM    CHIEF COMPLAINT & BRIEF HOSPITAL COURSE: HPI:  61 year old man with morbid obesity from [facility] [place] ambulates w/ walker has past medical hx of COPD not on O2 at home, hypothyroidism, anemia, left DVT, CKD, BPH, HLD, bipolar disorder, ambulatory dysfunction, lymphedema was BIBEMS as patient was complaining of worsening lower extremity weakness, lightheaded and overall not feeling well for one day. Of note, patient with slurred speech at baseline since childhood and known ambulatory dysfunction reason why he is on IRA. Patient denies chest pain, palpitations, sob or other symptoms.      [facility] full code    (30 Aug 2021 17:02)      INTERVAL HPI/OVERNIGHT EVENTS: Patient was examined while he was lying in bed, Aox3, responding appropriately to questions, in NAD. He has slurred speech at baseline which was noticed.     REVIEW OF SYSTEMS:  CONSTITUTIONAL: No fever, weight loss, or fatigue  RESPIRATORY: No cough, wheezing, chills or hemoptysis; No shortness of breath  CARDIOVASCULAR: No chest pain, palpitations, dizziness, or leg swelling  GASTROINTESTINAL: No abdominal pain. No nausea, vomiting, or hematemesis; No diarrhea or constipation. No melena or hematochezia.  GENITOURINARY: No dysuria or hematuria, urinary frequency  NEUROLOGICAL: No headaches, memory loss, loss of strength, numbness, or tremors  SKIN: No itching, burning, rashes, or lesions     MEDICATIONS  (STANDING):  ALBUTerol    90 MICROgram(s) HFA Inhaler 2 Puff(s) Inhalation <User Schedule>  aspirin enteric coated 81 milliGRAM(s) Oral daily  budesonide 160 MICROgram(s)/formoterol 4.5 MICROgram(s) Inhaler 2 Puff(s) Inhalation two times a day  buPROPion XL (24-Hour) . 150 milliGRAM(s) Oral daily  busPIRone 10 milliGRAM(s) Oral two times a day  cholecalciferol 1000 Unit(s) Oral daily  diVALproex  milliGRAM(s) Oral <User Schedule>  ferrous    sulfate 325 milliGRAM(s) Oral daily  finasteride 5 milliGRAM(s) Oral daily  folic acid 1 milliGRAM(s) Oral daily  furosemide   Injectable 40 milliGRAM(s) IV Push once  heparin   Injectable 5000 Unit(s) SubCutaneous every 8 hours  HYDROmorphone   Tablet 4 milliGRAM(s) Oral daily  levothyroxine 125 MICROGram(s) Oral daily  pantoprazole    Tablet 40 milliGRAM(s) Oral before breakfast  polyethylene glycol 3350 17 Gram(s) Oral daily  risperiDONE   Tablet 2 milliGRAM(s) Oral <User Schedule>  senna 2 Tablet(s) Oral at bedtime  simvastatin 20 milliGRAM(s) Oral at bedtime  tamsulosin 0.4 milliGRAM(s) Oral at bedtime    MEDICATIONS  (PRN):  acetaminophen   Tablet .. 650 milliGRAM(s) Oral every 6 hours PRN Temp greater or equal to 38C (100.4F), Mild Pain (1 - 3)      Vital Signs Last 24 Hrs  T(C): 36.8 (31 Aug 2021 08:32), Max: 37 (30 Aug 2021 11:00)  T(F): 98.3 (31 Aug 2021 08:32), Max: 98.6 (30 Aug 2021 11:00)  HR: 86 (31 Aug 2021 08:32) (74 - 88)  BP: 118/57 (31 Aug 2021 08:32) (108/63 - 139/63)  BP(mean): --  RR: 19 (31 Aug 2021 08:32) (18 - 19)  SpO2: 96% (31 Aug 2021 08:32) (87% - 99%)    PHYSICAL EXAMINATION:  GENERAL: NAD  HEAD:  Atraumatic, Normocephalic  EYES:  conjunctiva and sclera clear  NECK: Supple, No JVD,   CHEST/LUNG: Crackles present b/l lower lobes.   HEART: Regular rate and rhythm; No murmurs, rubs, or gallops  ABDOMEN: Soft, Nontender, Nondistended; Bowel sounds present  NERVOUS SYSTEM:  Alert & Oriented X3,    EXTREMITIES:  Pitting edema +2 b/l LE.   SKIN: warm dry, Venous stasis changes present b/l LE.                           11.0   7.62  )-----------( 235      ( 31 Aug 2021 08:02 )             36.5     08-31    140  |  108  |  43<H>  ----------------------------<  102<H>  4.9   |  28  |  4.01<H>    Ca    8.9      31 Aug 2021 08:02  Phos  6.8     08-31  Mg     2.5     08-31    TPro  7.4  /  Alb  2.7<L>  /  TBili  0.2  /  DBili  x   /  AST  8<L>  /  ALT  12  /  AlkPhos  98  08-30    LIVER FUNCTIONS - ( 30 Aug 2021 11:35 )  Alb: 2.7 g/dL / Pro: 7.4 g/dL / ALK PHOS: 98 U/L / ALT: 12 U/L DA / AST: 8 U/L / GGT: x           CARDIAC MARKERS ( 30 Aug 2021 18:43 )  0.179 ng/mL / x     / x     / x     / x      CARDIAC MARKERS ( 30 Aug 2021 11:35 )  0.203 ng/mL / x     / x     / x     / x

## 2021-08-31 NOTE — CONSULT NOTE ADULT - SUBJECTIVE AND OBJECTIVE BOX
Patient is a 61y old  Male who presents with a chief complaint of SHEYLA ON CKD, DEMAND ISCHEMIA, AMBULATORY DYSFUNCTION (31 Aug 2021 16:31)      Initial HPI on admission:  HPI:  61 year old man with morbid obesity from [facility] [place] amb[place] w/ walker has past medical hx of COPD not on O2 at home, hypothyroidism, anemia, left DVT, CKD, BPH, HLD, bipolar disorder, ambulatory dysfunction, lymphedema was BIBEMS as patient was complaining of worsening lower extremity weakness, lightheaded and overall not feeling well for one day. Of note, patient with slurred speech at baseline since childhood and known ambulatory dysfunction reason why he is on IRA. Patient denies chest pain, palpitations, sob or other symptoms.      [facility] full code    (30 Aug 2021 17:02)      BRIEF HOSPITAL COURSE:   On , pt was noted to be increasingly lethargic and drowsy and ws hypotensive as well. Rapid response team was called. Patient was only opening eyes to painful stimuli. Pinpoint pupils were noted. chart review showed he received Dilaudid 4mg PO in the afternoon. Narcan 0.4mg was given and pt opened eyes.   ABG shows PCO2 of 100, patient is placed on BIPAP and will be taken to ICU       PAST MEDICAL & SURGICAL HISTORY:  Hypothyroid    Hyperlipidemia    Ambulatory dysfunction    Anemia    History of BPH    CKD (chronic kidney disease)    Chronic obstructive pulmonary disease (COPD)    Bipolar disorder      Allergies    No Known Allergies    Intolerances      FAMILY HISTORY:          Medications:  acetaminophen   Tablet .. 650 milliGRAM(s) Oral every 6 hours PRN  ALBUTerol    90 MICROgram(s) HFA Inhaler 2 Puff(s) Inhalation <User Schedule>  aspirin enteric coated 81 milliGRAM(s) Oral daily  budesonide 160 MICROgram(s)/formoterol 4.5 MICROgram(s) Inhaler 2 Puff(s) Inhalation two times a day  buPROPion XL (24-Hour) . 150 milliGRAM(s) Oral daily  busPIRone 10 milliGRAM(s) Oral two times a day  cholecalciferol 1000 Unit(s) Oral daily  diVALproex  milliGRAM(s) Oral <User Schedule>  ferrous    sulfate 325 milliGRAM(s) Oral daily  finasteride 5 milliGRAM(s) Oral daily  folic acid 1 milliGRAM(s) Oral daily  heparin   Injectable 5000 Unit(s) SubCutaneous every 8 hours  HYDROmorphone   Tablet 4 milliGRAM(s) Oral daily  levothyroxine 125 MICROGram(s) Oral daily  naloxone Injectable 0.4 milliGRAM(s) IV Push once  naloxone Injectable 0.4 milliGRAM(s) IV Push once  pantoprazole    Tablet 40 milliGRAM(s) Oral before breakfast  polyethylene glycol 3350 17 Gram(s) Oral daily  risperiDONE   Tablet 2 milliGRAM(s) Oral <User Schedule>  senna 2 Tablet(s) Oral at bedtime  sevelamer carbonate 800 milliGRAM(s) Oral three times a day with meals  simvastatin 20 milliGRAM(s) Oral at bedtime  tamsulosin 0.4 milliGRAM(s) Oral at bedtime      vent settings      Vital Signs Last 24 Hrs  T(C): 37.7 (31 Aug 2021 15:18), Max: 37.7 (31 Aug 2021 15:18)  T(F): 99.8 (31 Aug 2021 15:18), Max: 99.8 (31 Aug 2021 15:18)  HR: 99 (31 Aug 2021 21:15) (86 - 99)  BP: 122/66 (31 Aug 2021 15:18) (108/63 - 139/63)  BP(mean): --  RR: 18 (31 Aug 2021 15:18) (18 - 19)  SpO2: 96% (31 Aug 2021 21:15) (96% - 99%)    ABG - ( 31 Aug 2021 20:59 )  pH, Arterial: 7.05  pH, Blood: x     /  pCO2: 101   /  pO2: 86    / HCO3: 28    / Base Excess: -4.5  /  SaO2: 97                    08-30 @ 07:01  -   @ 07:00  --------------------------------------------------------  IN: 0 mL / OUT: 900 mL / NET: -900 mL          LABS:                        11.0   7.62  )-----------( 235      ( 31 Aug 2021 08:02 )             36.5     08    140  |  108  |  43<H>  ----------------------------<  102<H>  4.9   |  28  |  4.01<H>    Ca    8.9      31 Aug 2021 08:02  Phos  6.8       Mg     2.5         TPro  7.4  /  Alb  2.7<L>  /  TBili  0.2  /  DBili  x   /  AST  8<L>  /  ALT  12  /  AlkPhos  98        CARDIAC MARKERS ( 30 Aug 2021 18:43 )  0.179 ng/mL / x     / x     / x     / x      CARDIAC MARKERS ( 30 Aug 2021 11:35 )  0.203 ng/mL / x     / x     / x     / x          CAPILLARY BLOOD GLUCOSE      POCT Blood Glucose.: 146 mg/dL (31 Aug 2021 20:52)      Urinalysis Basic - ( 30 Aug 2021 18:47 )    Color: Yellow / Appearance: Clear / S.015 / pH: x  Gluc: x / Ketone: Negative  / Bili: Negative / Urobili: Negative   Blood: x / Protein: 100 / Nitrite: Negative   Leuk Esterase: Negative / RBC: 0-2 /HPF / WBC 3-5 /HPF   Sq Epi: x / Non Sq Epi: Occasional /HPF / Bacteria: Trace /HPF      CULTURES:  Culture Results:   <10,000 CFU/mL Normal Urogenital Ivelisse ( @ 21:13)    Clean Catch Clean Catch (Midstream)      Physical Examination:    PHYSICAL EXAM:  GENERAL: NAD, speaks in full sentences, no signs of respiratory distress  HEAD:  Atraumatic, Normocephalic  EYES: EOMI, PERRLA, conjunctiva and sclera clear  NECK: Supple, No JVD  CHEST/LUNG: Clear to auscultation bilaterally; No wheeze; No crackles; No accessory muscles used  HEART: Regular rate and rhythm; No murmurs;   ABDOMEN: Soft, Nontender, Nondistended; Bowel sounds present; No guarding  EXTREMITIES:  2+ Peripheral Pulses, No cyanosis or edema  PSYCH: AAOx3  NEUROLOGY: non-focal  SKIN: No rashes or lesions    RADIOLOGY REVIEWED yes            ASSESSMENT AND PLAN:   61 year old man with morbid obesity from [facility] [place] ambulates w/ walker has past medical hx of COPD not on O2 at home, hypothyroidism, anemia, left DVT, CKD, BPH, HLD, bipolar disorder, initially admitted for increased lethargy is admitted for hypercapnic respiratroy failure requiring BIPAP     1- Acute Encephalopathy  2-Acute Hypercapnic Respiratory failure   3- Opioid Overdose  4-Bipolar Disorder  5- COPD  6-CKD  7-Hypothyroidism  8-Anemia    Neuro  #Acute Encephalopathy  Patient noted to be progressively lethargic and drowsy, responding to painful stimuli only   Rapid response called, pinpoint pupils noted  s/p Dilaudid 4mg PO, Buspirone, depakote, bupropion during the day (All home medications)  s/p Narcan 0.4mg x 2  with adequate response , Back to baseline   No indication of CT Hat present   Elevated PCO2 adding upto depression   will start on BIPAP and repeat ABG   Closely monitor for mental status in ICU     Cardiovascular  Questionable CHF , f/u Echo     #Elevated troponin  Likely demand ischemia, already trended down   no active complaint     Pulmonary  #Acute Hypercapnic Respiratory Failure   PCO2 100 , likely due to medication affect and underlying TEJ/Obesity hypoventilation (undiagnosed)  Will start on BIPAP and recheck ABG     #COPD  Not on home medications   continue home medications       Infections  No acute infections , however, patient is producing green phlegm   No findings on CXR for now   will monitor    Nephro  #CKD  Cr slowly going up.   pt is making urine   s/p Lasix 40mg IVP in Am   F/u Repeat BMP     Gastrointestinal  No acute issues   Senna for constipation     Heme  H/O DVT , not on AC for unknown reason   D-Dimer 217     Endocrine  FS Q6 while he is NPO      Prophylaxis   Guarded     Disposition   Transfer to ICU

## 2021-08-31 NOTE — CONSULT NOTE ADULT - SUBJECTIVE AND OBJECTIVE BOX
C A R D I O L O G Y  *********************    DATE OF SERVICE: 08-31-21    HISTORY OF PRESENT ILLNESS: HPI:  61 year old man with morbid obesity from [facility] [place] amb[place] w/ walker has past medical hx of COPD not on O2 at home, hypothyroidism, anemia, left DVT, CKD, BPH, HLD, bipolar disorder, ambulatory dysfunction, lymphedema was BIBEMS as patient was complaining of worsening lower extremity weakness, lightheaded and overall not feeling well for one day. Of note, patient with slurred speech at baseline since childhood and known ambulatory dysfunction reason why [facility]. Patient denies chest pain, palpitations, sob or other symptoms.  No CP, No LOC      GOC full code    (30 Aug 2021 17:02)      PAST MEDICAL & SURGICAL HISTORY:  Hypothyroid    Hyperlipidemia    Ambulatory dysfunction    Anemia    History of BPH    CKD (chronic kidney disease)    Chronic obstructive pulmonary disease (COPD)    Bipolar disorder      MEDICATIONS:  MEDICATIONS  (STANDING):  ALBUTerol    90 MICROgram(s) HFA Inhaler 2 Puff(s) Inhalation <User Schedule>  aspirin enteric coated 81 milliGRAM(s) Oral daily  budesonide 160 MICROgram(s)/formoterol 4.5 MICROgram(s) Inhaler 2 Puff(s) Inhalation two times a day  buPROPion XL (24-Hour) . 150 milliGRAM(s) Oral daily  busPIRone 10 milliGRAM(s) Oral two times a day  cholecalciferol 1000 Unit(s) Oral daily  diVALproex  milliGRAM(s) Oral <User Schedule>  ferrous    sulfate 325 milliGRAM(s) Oral daily  finasteride 5 milliGRAM(s) Oral daily  folic acid 1 milliGRAM(s) Oral daily  furosemide   Injectable 40 milliGRAM(s) IV Push once  heparin   Injectable 5000 Unit(s) SubCutaneous every 8 hours  HYDROmorphone   Tablet 4 milliGRAM(s) Oral daily  levothyroxine 125 MICROGram(s) Oral daily  pantoprazole    Tablet 40 milliGRAM(s) Oral before breakfast  polyethylene glycol 3350 17 Gram(s) Oral daily  risperiDONE   Tablet 2 milliGRAM(s) Oral <User Schedule>  senna 2 Tablet(s) Oral at bedtime  simvastatin 20 milliGRAM(s) Oral at bedtime  tamsulosin 0.4 milliGRAM(s) Oral at bedtime      Allergies    No Known Allergies    Intolerances        FAMILY HISTORY:    Non-contributary for premature coronary disease or sudden cardiac death    SOCIAL HISTORY:    [ X] Non-smoker  [ ] Smoker  [ ] Alcohol        REVIEW OF SYSTEMS:  [ ]chest pain  [  ]shortness of breath  [  ]palpitations  [  ]syncope  [ ]near syncope [ ]upper extremity weakness   [ ] lower extremity weakness  [  ]diplopia  [  ]altered mental status   [  ]fevers  [ ]chills [ ]nausea  [ ]vomitting  [  ]dysphagia    [ ]abdominal pain  [ ]melena  [ ]BRBPR    [  ]epistaxis  [  ]rash    [ ]lower extremity edema        [X] All others negative	  [ ] Unable to obtain      LABS:	 	    CARDIAC MARKERS:  CARDIAC MARKERS ( 30 Aug 2021 18:43 )  0.179 ng/mL / x     / x     / x     / x      CARDIAC MARKERS ( 30 Aug 2021 11:35 )  0.203 ng/mL / x     / x     / x     / x                                  11.0   7.62  )-----------( 235      ( 31 Aug 2021 08:02 )             36.5     Hb Trend: 11.0<--, 11.5<--    08-31    140  |  108  |  43<H>  ----------------------------<  102<H>  4.9   |  28  |  4.01<H>    Ca    8.9      31 Aug 2021 08:02  Phos  6.8     08-31  Mg     2.5     08-31    TPro  7.4  /  Alb  2.7<L>  /  TBili  0.2  /  DBili  x   /  AST  8<L>  /  ALT  12  /  AlkPhos  98  08-30    Creatinine Trend: 4.01<--, 3.82<--, 3.86<--      proBNP: Serum Pro-Brain Natriuretic Peptide: 3159 pg/mL (08-30 @ 11:35)    TSH: Thyroid Stimulating Hormone, Serum: 1.24 uU/mL (08-31 @ 08:02)      PHYSICAL EXAM:  T(C): 36.8 (08-31-21 @ 08:32), Max: 37 (08-30-21 @ 11:00)  HR: 86 (08-31-21 @ 08:32) (74 - 88)  BP: 118/57 (08-31-21 @ 08:32) (108/63 - 139/63)  RR: 19 (08-31-21 @ 08:32) (18 - 19)  SpO2: 96% (08-31-21 @ 08:32) (87% - 99%)  Wt(kg): --   BMI (kg/m2): 44.6 (08-30-21 @ 08:52)  I&O's Summary    30 Aug 2021 07:01  -  31 Aug 2021 07:00  --------------------------------------------------------  IN: 0 mL / OUT: 900 mL / NET: -900 mL      HEENT:  (-)icterus (-)pallor  CV: N S1 S2 1/6 ABIGAIL (+)2 Pulses B/l  Resp:  Clear to ausculatation B/L, normal effort  GI: (+) BS Soft, NT, ND  Lymph:  (-)Edema, (-)obvious lymphadenopathy  Skin: Warm to touch, Normal turgor  Psych: Appropriate mood and affect        TELEMETRY: 	  Sinus    ECG:  	Sinus RBBB, LAFB, Bifascicular block     RADIOLOGY:         CXR:   < from: Xray Chest 1 View-PORTABLE IMMEDIATE (08.30.21 @ 10:03) >  No active pulmonary disease.    Thank you for the courtesy of this referral.    < end of copied text >      ASSESSMENT/PLAN: 	61y Male morbid obesity from [facility] [place] ambulates w/ walker has past medical hx of COPD not on O2 at home, hypothyroidism, anemia, left DVT, CKD, BPH, HLD, bipolar disorder, ambulatory dysfunction, lymphedema was BIBEMS as patient was complaining of worsening lower extremity weakness and  lightheadedness    - No LOC.  Cont tele  - Check echo  - will follow    I once again thank you for allowing me to participate in the care of your patient.  If you have any questions or concerns please do not hesitate to contact me.    Austen Javier MD, [facility]  BEEPER [phone]

## 2021-08-31 NOTE — DIETITIAN INITIAL EVALUATION ADULT. - PROBLEM SELECTOR PLAN 3
- Patient with known ambulatory dysfunction, walks with walker  - Most likely worsening lymphedema and morbid obesity   - CT head unremarkable for acute process   - Doppler LE -ve for DVT  - Nutritionist consulted  - Fall precautions   - PT consulted

## 2021-08-31 NOTE — PROGRESS NOTE ADULT - NUTRITIONAL ASSESSMENT
Diet, Regular:   Low Fat (LOWFAT)  1000mL Fluid Restriction ([id number]) (08-30-21 @ 17:41) [Active]

## 2021-08-31 NOTE — DIETITIAN INITIAL EVALUATION ADULT. - PROBLEM SELECTOR PLAN 2
- On admission, Patient with elevated troponin 0.20 >0.17, Pro BNP 3k compared to 4k on last admission in 2019, no chest pain or sob  - Elevated troponin most likely demand ischemia 2/2 SHEYLA on CKD (worsening CKD)  - EKG RBBB, no acute changes  - CXR unremarkable, no signs of pulmonary congestion, cardiomegaly   - F/u echo, no previous echo found   - F/u orthostatics and troponin  - Admitted to telemetry   - Cardio Dr Javier consulted

## 2021-08-31 NOTE — PROGRESS NOTE ADULT - PROBLEM SELECTOR PLAN 1
- On admission, patient with Cr 3.8, baseline 3.1  - SHEYLA most likely pre renal in the setting of worsening LE lymphedema from medication non compliance?  - F/u urine lytes (urine sodium, urine creatinine, urine urea)  - Monitor I/O's daily  - Avoid nephrotoxins, NSAIDS, ACEI and ARBS  - Will order one dose Lasix 40 mg IVP to help with diuresis   - Strict I and O, daily weight  - Monitor BMP daily  - Consider Nephro consult in the am if Creatinine worsens - On admission, patient with Cr 3.8, baseline 3.1, Current 4.01  - SHEYLA most likely pre renal in the setting of worsening LE lymphedema from medication non compliance?  - F/u urine lytes (urine sodium, urine creatinine, urine urea)  - Monitor I/O's daily  - Avoid nephrotoxins, NSAIDS, ACEI and ARBS  - Will order one dose Lasix 40 mg IVP to help with diuresis   - Strict I and O, daily weight  - Monitor BMP daily  - Dr. Valenzuela Nephcolleen consulted.

## 2021-08-31 NOTE — DIETITIAN INITIAL EVALUATION ADULT. - PERTINENT MEDS FT
MEDICATIONS  (STANDING):  ALBUTerol    90 MICROgram(s) HFA Inhaler 2 Puff(s) Inhalation <User Schedule>  aspirin enteric coated 81 milliGRAM(s) Oral daily  budesonide 160 MICROgram(s)/formoterol 4.5 MICROgram(s) Inhaler 2 Puff(s) Inhalation two times a day  buPROPion XL (24-Hour) . 150 milliGRAM(s) Oral daily  busPIRone 10 milliGRAM(s) Oral two times a day  cholecalciferol 1000 Unit(s) Oral daily  diVALproex  milliGRAM(s) Oral <User Schedule>  ferrous    sulfate 325 milliGRAM(s) Oral daily  finasteride 5 milliGRAM(s) Oral daily  folic acid 1 milliGRAM(s) Oral daily  heparin   Injectable 5000 Unit(s) SubCutaneous every 8 hours  HYDROmorphone   Tablet 4 milliGRAM(s) Oral daily  levothyroxine 125 MICROGram(s) Oral daily  pantoprazole    Tablet 40 milliGRAM(s) Oral before breakfast  polyethylene glycol 3350 17 Gram(s) Oral daily  risperiDONE   Tablet 2 milliGRAM(s) Oral <User Schedule>  senna 2 Tablet(s) Oral at bedtime  simvastatin 20 milliGRAM(s) Oral at bedtime  tamsulosin 0.4 milliGRAM(s) Oral at bedtime    MEDICATIONS  (PRN):  acetaminophen   Tablet .. 650 milliGRAM(s) Oral every 6 hours PRN Temp greater or equal to 38C (100.4F), Mild Pain (1 - 3)

## 2021-09-01 DIAGNOSIS — M79.605 PAIN IN LEFT LEG: ICD-10-CM

## 2021-09-01 LAB
24R-OH-CALCIDIOL SERPL-MCNC: 28.4 NG/ML — LOW (ref 30–80)
ALBUMIN SERPL ELPH-MCNC: 2.4 G/DL — LOW (ref 3.5–5)
ALP SERPL-CCNC: 85 U/L — SIGNIFICANT CHANGE UP (ref 40–120)
ALT FLD-CCNC: 12 U/L DA — SIGNIFICANT CHANGE UP (ref 10–60)
ANION GAP SERPL CALC-SCNC: 6 MMOL/L — SIGNIFICANT CHANGE UP (ref 5–17)
ANISOCYTOSIS BLD QL: SLIGHT — SIGNIFICANT CHANGE UP
AST SERPL-CCNC: 11 U/L — SIGNIFICANT CHANGE UP (ref 10–40)
BASE EXCESS BLDA CALC-SCNC: -3.5 MMOL/L — LOW (ref -2–3)
BASE EXCESS BLDA CALC-SCNC: -5.1 MMOL/L — LOW (ref -2–3)
BASE EXCESS BLDA CALC-SCNC: -5.2 MMOL/L — LOW (ref -2–3)
BASOPHILS # BLD AUTO: 0.02 K/UL — SIGNIFICANT CHANGE UP (ref 0–0.2)
BASOPHILS NFR BLD AUTO: 0.2 % — SIGNIFICANT CHANGE UP (ref 0–2)
BILIRUB SERPL-MCNC: 0.2 MG/DL — SIGNIFICANT CHANGE UP (ref 0.2–1.2)
BLOOD GAS COMMENTS ARTERIAL: SIGNIFICANT CHANGE UP
BUN SERPL-MCNC: 49 MG/DL — HIGH (ref 7–18)
CALCIUM SERPL-MCNC: 8.5 MG/DL — SIGNIFICANT CHANGE UP (ref 8.4–10.5)
CALCIUM SERPL-MCNC: 8.7 MG/DL — SIGNIFICANT CHANGE UP (ref 8.4–10.5)
CHLORIDE SERPL-SCNC: 111 MMOL/L — HIGH (ref 96–108)
CO2 SERPL-SCNC: 25 MMOL/L — SIGNIFICANT CHANGE UP (ref 22–31)
CREAT SERPL-MCNC: 4.31 MG/DL — HIGH (ref 0.5–1.3)
DACRYOCYTES BLD QL SMEAR: SLIGHT — SIGNIFICANT CHANGE UP
EOSINOPHIL # BLD AUTO: 0.01 K/UL — SIGNIFICANT CHANGE UP (ref 0–0.5)
EOSINOPHIL NFR BLD AUTO: 0.1 % — SIGNIFICANT CHANGE UP (ref 0–6)
GLUCOSE SERPL-MCNC: 99 MG/DL — SIGNIFICANT CHANGE UP (ref 70–99)
HCO3 BLDA-SCNC: 25 MMOL/L — SIGNIFICANT CHANGE UP (ref 21–28)
HCO3 BLDA-SCNC: 26 MMOL/L — SIGNIFICANT CHANGE UP (ref 21–28)
HCO3 BLDA-SCNC: 26 MMOL/L — SIGNIFICANT CHANGE UP (ref 21–28)
HCT VFR BLD CALC: 37.4 % — LOW (ref 39–50)
HGB BLD-MCNC: 11.4 G/DL — LOW (ref 13–17)
HOROWITZ INDEX BLDA+IHG-RTO: 35 — SIGNIFICANT CHANGE UP
HOROWITZ INDEX BLDA+IHG-RTO: 40 — SIGNIFICANT CHANGE UP
HOROWITZ INDEX BLDA+IHG-RTO: 50 — SIGNIFICANT CHANGE UP
IMM GRANULOCYTES NFR BLD AUTO: 1.4 % — SIGNIFICANT CHANGE UP (ref 0–1.5)
LG PLATELETS BLD QL AUTO: SLIGHT — SIGNIFICANT CHANGE UP
LYMPHOCYTES # BLD AUTO: 0.94 K/UL — LOW (ref 1–3.3)
LYMPHOCYTES # BLD AUTO: 10.3 % — LOW (ref 13–44)
MACROCYTES BLD QL: SLIGHT — SIGNIFICANT CHANGE UP
MAGNESIUM SERPL-MCNC: 2.6 MG/DL — SIGNIFICANT CHANGE UP (ref 1.6–2.6)
MCHC RBC-ENTMCNC: 30.5 GM/DL — LOW (ref 32–36)
MCHC RBC-ENTMCNC: 32.4 PG — SIGNIFICANT CHANGE UP (ref 27–34)
MCV RBC AUTO: 106.3 FL — HIGH (ref 80–100)
MICROCYTES BLD QL: SLIGHT — SIGNIFICANT CHANGE UP
MONOCYTES # BLD AUTO: 1.39 K/UL — HIGH (ref 0–0.9)
MONOCYTES NFR BLD AUTO: 15.2 % — HIGH (ref 2–14)
NEUTROPHILS # BLD AUTO: 6.68 K/UL — SIGNIFICANT CHANGE UP (ref 1.8–7.4)
NEUTROPHILS NFR BLD AUTO: 72.8 % — SIGNIFICANT CHANGE UP (ref 43–77)
NRBC # BLD: 0 /100 WBCS — SIGNIFICANT CHANGE UP (ref 0–0)
OVALOCYTES BLD QL SMEAR: SLIGHT — SIGNIFICANT CHANGE UP
PCO2 BLDA: 62 MMHG — HIGH (ref 35–48)
PCO2 BLDA: 75 MMHG — CRITICAL HIGH (ref 35–48)
PCO2 BLDA: 80 MMHG — CRITICAL HIGH (ref 35–48)
PH BLDA: 7.12 — CRITICAL LOW (ref 7.35–7.45)
PH BLDA: 7.14 — CRITICAL LOW (ref 7.35–7.45)
PH BLDA: 7.22 — LOW (ref 7.35–7.45)
PHOSPHATE SERPL-MCNC: 6.6 MG/DL — HIGH (ref 2.5–4.5)
PLAT MORPH BLD: NORMAL — SIGNIFICANT CHANGE UP
PLATELET # BLD AUTO: 195 K/UL — SIGNIFICANT CHANGE UP (ref 150–400)
PO2 BLDA: 77 MMHG — LOW (ref 83–108)
PO2 BLDA: 83 MMHG — SIGNIFICANT CHANGE UP (ref 83–108)
PO2 BLDA: 88 MMHG — SIGNIFICANT CHANGE UP (ref 83–108)
POIKILOCYTOSIS BLD QL AUTO: SLIGHT — SIGNIFICANT CHANGE UP
POLYCHROMASIA BLD QL SMEAR: SLIGHT — SIGNIFICANT CHANGE UP
POTASSIUM SERPL-MCNC: 4.9 MMOL/L — SIGNIFICANT CHANGE UP (ref 3.5–5.3)
POTASSIUM SERPL-SCNC: 4.9 MMOL/L — SIGNIFICANT CHANGE UP (ref 3.5–5.3)
PROT SERPL-MCNC: 7.2 G/DL — SIGNIFICANT CHANGE UP (ref 6–8.3)
PTH-INTACT FLD-MCNC: 330 PG/ML — HIGH (ref 15–65)
RBC # BLD: 3.52 M/UL — LOW (ref 4.2–5.8)
RBC # FLD: 14 % — SIGNIFICANT CHANGE UP (ref 10.3–14.5)
RBC BLD AUTO: ABNORMAL
SAO2 % BLDA: 97 % — SIGNIFICANT CHANGE UP
SAO2 % BLDA: 97 % — SIGNIFICANT CHANGE UP
SAO2 % BLDA: 98 % — SIGNIFICANT CHANGE UP
SODIUM SERPL-SCNC: 142 MMOL/L — SIGNIFICANT CHANGE UP (ref 135–145)
WBC # BLD: 9.17 K/UL — SIGNIFICANT CHANGE UP (ref 3.8–10.5)
WBC # FLD AUTO: 9.17 K/UL — SIGNIFICANT CHANGE UP (ref 3.8–10.5)

## 2021-09-01 PROCEDURE — 71045 X-RAY EXAM CHEST 1 VIEW: CPT | Mod: 26

## 2021-09-01 PROCEDURE — 99223 1ST HOSP IP/OBS HIGH 75: CPT

## 2021-09-01 RX ORDER — LIDOCAINE 4 G/100G
1 CREAM TOPICAL DAILY
Refills: 0 | Status: DISCONTINUED | OUTPATIENT
Start: 2021-09-01 | End: 2021-09-08

## 2021-09-01 RX ORDER — CALCITRIOL 0.5 UG/1
0.25 CAPSULE ORAL DAILY
Refills: 0 | Status: DISCONTINUED | OUTPATIENT
Start: 2021-09-01 | End: 2021-09-08

## 2021-09-01 RX ORDER — ACETAMINOPHEN 500 MG
1000 TABLET ORAL EVERY 8 HOURS
Refills: 0 | Status: DISCONTINUED | OUTPATIENT
Start: 2021-09-01 | End: 2021-09-08

## 2021-09-01 RX ORDER — LEVOTHYROXINE SODIUM 125 MCG
94 TABLET ORAL
Refills: 0 | Status: DISCONTINUED | OUTPATIENT
Start: 2021-09-01 | End: 2021-09-02

## 2021-09-01 RX ORDER — FUROSEMIDE 40 MG
40 TABLET ORAL DAILY
Refills: 0 | Status: DISCONTINUED | OUTPATIENT
Start: 2021-09-01 | End: 2021-09-06

## 2021-09-01 RX ORDER — NICOTINE POLACRILEX 2 MG
1 GUM BUCCAL DAILY
Refills: 0 | Status: DISCONTINUED | OUTPATIENT
Start: 2021-09-01 | End: 2021-09-08

## 2021-09-01 RX ADMIN — Medication 1 MILLIGRAM(S): at 13:40

## 2021-09-01 RX ADMIN — RISPERIDONE 2 MILLIGRAM(S): 4 TABLET ORAL at 20:11

## 2021-09-01 RX ADMIN — Medication 1000 UNIT(S): at 13:40

## 2021-09-01 RX ADMIN — BUDESONIDE AND FORMOTEROL FUMARATE DIHYDRATE 2 PUFF(S): 160; 4.5 AEROSOL RESPIRATORY (INHALATION) at 21:25

## 2021-09-01 RX ADMIN — Medication 94 MICROGRAM(S): at 13:39

## 2021-09-01 RX ADMIN — FINASTERIDE 5 MILLIGRAM(S): 5 TABLET, FILM COATED ORAL at 13:40

## 2021-09-01 RX ADMIN — DIVALPROEX SODIUM 500 MILLIGRAM(S): 500 TABLET, DELAYED RELEASE ORAL at 17:10

## 2021-09-01 RX ADMIN — ALBUTEROL 2 PUFF(S): 90 AEROSOL, METERED ORAL at 17:10

## 2021-09-01 RX ADMIN — Medication 325 MILLIGRAM(S): at 13:40

## 2021-09-01 RX ADMIN — ALBUTEROL 2 PUFF(S): 90 AEROSOL, METERED ORAL at 13:39

## 2021-09-01 RX ADMIN — BUDESONIDE AND FORMOTEROL FUMARATE DIHYDRATE 2 PUFF(S): 160; 4.5 AEROSOL RESPIRATORY (INHALATION) at 13:39

## 2021-09-01 RX ADMIN — Medication 10 MILLIGRAM(S): at 17:10

## 2021-09-01 RX ADMIN — BUPROPION HYDROCHLORIDE 150 MILLIGRAM(S): 150 TABLET, EXTENDED RELEASE ORAL at 13:40

## 2021-09-01 RX ADMIN — HEPARIN SODIUM 5000 UNIT(S): 5000 INJECTION INTRAVENOUS; SUBCUTANEOUS at 13:38

## 2021-09-01 RX ADMIN — HEPARIN SODIUM 5000 UNIT(S): 5000 INJECTION INTRAVENOUS; SUBCUTANEOUS at 05:50

## 2021-09-01 RX ADMIN — POLYETHYLENE GLYCOL 3350 17 GRAM(S): 17 POWDER, FOR SOLUTION ORAL at 13:39

## 2021-09-01 RX ADMIN — HEPARIN SODIUM 5000 UNIT(S): 5000 INJECTION INTRAVENOUS; SUBCUTANEOUS at 21:26

## 2021-09-01 RX ADMIN — Medication 81 MILLIGRAM(S): at 13:40

## 2021-09-01 RX ADMIN — Medication 40 MILLIGRAM(S): at 17:10

## 2021-09-01 RX ADMIN — SEVELAMER CARBONATE 800 MILLIGRAM(S): 2400 POWDER, FOR SUSPENSION ORAL at 17:10

## 2021-09-01 NOTE — CONSULT NOTE ADULT - REASON FOR ADMISSION
SHEYLA ON CKD, DEMAND ISCHEMIA, AMBULATORY DYSFUNCTION

## 2021-09-01 NOTE — CHART NOTE - NSCHARTNOTEFT_GEN_A_CORE
spoke with Germán Rhoades ([phone]), who is on business trip, but can still receive update about the patient.    Originally, the sister wanted to receive update but Germán said he can get update instead.    Informed him of patient's improving hypercapnia, and improving lethargy.

## 2021-09-01 NOTE — PROGRESS NOTE ADULT - SUBJECTIVE AND OBJECTIVE BOX
EP COVERING C A R D I O L O G Y  *********************    DATE OF SERVICE: 09-01-21    HISTORY OF PRESENT ILLNESS: HPI:  61 year old man with morbid obesity from [facility] [place] amb[place] w/ walker has past medical hx of COPD not on O2 at home, hypothyroidism, anemia, left DVT, CKD, BPH, HLD, bipolar disorder, ambulatory dysfunction, lymphedema was BIBEMS as patient was complaining of worsening lower extremity weakness, lightheaded and overall not feeling well for one day. Of note, patient with slurred speech at baseline since childhood and known ambulatory dysfunction reason why he is on [facility]. Patient denies chest pain, palpitations, sob or other symptoms.  No CP, No LOC    Overnight had RRT for lethargy / impaired work of breathing. Was deemed likely narcotic toxicity. Was treated w/ narcan, started on BIPAP, moved to MICU.  This morning is more awake and alert. States he is thirsty and wants to go home to [facility].  No angina or palpitaitons.    acetaminophen   Tablet .. 650 milliGRAM(s) Oral every 6 hours PRN  ALBUTerol    90 MICROgram(s) HFA Inhaler 2 Puff(s) Inhalation <User Schedule>  aspirin enteric coated 81 milliGRAM(s) Oral daily  budesonide 160 MICROgram(s)/formoterol 4.5 MICROgram(s) Inhaler 2 Puff(s) Inhalation two times a day  buPROPion XL (24-Hour) . 150 milliGRAM(s) Oral daily  busPIRone 10 milliGRAM(s) Oral two times a day  cholecalciferol 1000 Unit(s) Oral daily  diVALproex  milliGRAM(s) Oral <User Schedule>  ferrous    sulfate 325 milliGRAM(s) Oral daily  finasteride 5 milliGRAM(s) Oral daily  folic acid 1 milliGRAM(s) Oral daily  heparin   Injectable 5000 Unit(s) SubCutaneous every 8 hours  levothyroxine Injectable 94 MICROGram(s) IV Push <User Schedule>  pantoprazole    Tablet 40 milliGRAM(s) Oral before breakfast  phenylephrine    Infusion 0.1 MICROgram(s)/kG/Min IV Continuous <Continuous>  polyethylene glycol 3350 17 Gram(s) Oral daily  risperiDONE   Tablet 2 milliGRAM(s) Oral <User Schedule>  senna 2 Tablet(s) Oral at bedtime  sevelamer carbonate 800 milliGRAM(s) Oral three times a day with meals  simvastatin 20 milliGRAM(s) Oral at bedtime  sodium chloride 0.9%. 1000 milliLiter(s) IV Continuous <Continuous>  tamsulosin 0.4 milliGRAM(s) Oral at bedtime                        11.4   9.17  )-----------( 195      ( 01 Sep 2021 05:22 )             37.4       09-01    142  |  111<H>  |  49<H>  ----------------------------<  99  4.9   |  25  |  4.31<H>    Ca    8.7      01 Sep 2021 05:22  Phos  6.6     09-01  Mg     2.6     09-01    TPro  7.2  /  Alb  2.4<L>  /  TBili  0.2  /  DBili  x   /  AST  11  /  ALT  12  /  AlkPhos  85  09-01      CARDIAC MARKERS ( 30 Aug 2021 18:43 )  0.179 ng/mL / x     / x     / x     / x      CARDIAC MARKERS ( 30 Aug 2021 11:35 )  0.203 ng/mL / x     / x     / x     / x          T(C): 36.1 (09-01-21 @ 04:00), Max: 37.7 (08-31-21 @ 15:18)  HR: 77 (09-01-21 @ 08:44) (77 - 99)  BP: 105/59 (09-01-21 @ 06:45) (85/41 - 127/50)  RR: 17 (09-01-21 @ 06:45) (14 - 27)  SpO2: 97% (09-01-21 @ 08:44) (89% - 100%)  Wt(kg): --    I&O's Summary    31 Aug 2021 07:01  -  01 Sep 2021 07:00  --------------------------------------------------------  IN: 1158.8 mL / OUT: 1295 mL / NET: -136.2 mL    Gen: pleasant overweight white male in no acute distress, cooperative.  HEENT:  (-)icterus (-)pallor  CV: N S1 S2 1/6 ABIGAIL (+)2 Pulses B/l  Resp:  Clear to ausculatation B/L, normal effort  GI: (+) BS Soft, NT, ND  Lymph:  severe  edema, (-)obvious lymphadenopathy  Skin: Warm to touch, Normal turgor  Psych: Appropriate mood and affect    TELEMETRY: 	  Sinus    ECG:  	Sinus RBBB, LAFB, Bifascicular block     RADIOLOGY:         CXR:   < from: Xray Chest 1 View-PORTABLE IMMEDIATE (08.30.21 @ 10:03) >  No active pulmonary disease.    Thank you for the courtesy of this referral.    < end of copied text >      ASSESSMENT/PLAN: 	61y Male morbid obesity from [facility] [place] ambulates w/ walker has past medical hx of COPD not on O2 at home, hypothyroidism, anemia, left DVT, CKD, BPH, HLD, bipolar disorder, ambulatory dysfunction, lymphedema was BIBEMS as patient was complaining of worsening lower extremity weakness and  lightheadedness    -  Recovering well after Narcan for opiate toxicity.  Cont tele  - Check echo re: lightheadedness, pending.  - will follow    Munir Bass M.D.  Cardiac Electrophysiology  [phone]

## 2021-09-01 NOTE — PROGRESS NOTE ADULT - ASSESSMENT
61 year old man with extensive medical hx mentioned above was BIBEMS for worsening lower extremity weakness, lightheadedness and overall not feeling well. Patient admitted for SHEYLA on CKD and elevated troponin    61 year old man with morbid obesity from [facility] [place] ambulates w/ walker has past medical hx of COPD not on O2 at home, hypothyroidism, anemia, left DVT, CKD, BPH, HLD, bipolar disorder, initially admitted for increased lethargy is admitted for hypercapnic respiratroy failure requiring BIPAP     1- Acute Encephalopathy  2-Acute Hypercapnic Respiratory failure   3- Opioid Overdose  4-Bipolar Disorder  5- COPD  6-CKD  7-Hypothyroidism  8-Anemia    Neuro  #Acute Encephalopathy  Patient noted to be progressively lethargic and drowsy, responding to painful stimuli only   Rapid response called, pinpoint pupils noted  s/p Dilaudid 4mg PO, Buspirone, depakote, bupropion during the day (All home medications)  s/p Narcan 0.4mg x 2  with adequate response , Back to baseline   No indication of CT Hat present   Elevated PCO2 adding upto depression   will start on BIPAP and repeat ABG   Closely monitor for mental status in ICU     Cardiovascular  Questionable CHF , f/u Echo     #Elevated troponin  Likely demand ischemia, already trended down   no active complaint     Pulmonary  #Acute Hypercapnic Respiratory Failure   PCO2 100 , likely due to medication affect and underlying TEJ/Obesity hypoventilation (undiagnosed)  Will start on BIPAP and recheck ABG     #COPD  Not on home medications   continue home medications       Infections  No acute infections , however, patient is producing green phlegm   No findings on CXR for now   will monitor    Nephro  #CKD  Cr slowly going up.   pt is making urine   s/p Lasix 40mg IVP in Am   F/u Repeat BMP  f/u renal US    Gastrointestinal  No acute issues   Senna for constipation     Heme  H/O DVT , not on AC for unknown reason   D-Dimer 217     Endocrine  FS Q6 while he is NPO      Prophylaxis   Guarded     Disposition   Transfer to ICU         61 year old man with morbid obesity from [facility] [place] ambulates w/ walker has past medical hx of COPD not on O2 at home, hypothyroidism, anemia, left DVT, CKD, BPH, HLD, bipolar disorder, initially admitted for increased lethargy is admitted for hypercapnic respiratroy failure requiring BIPAP     1- Acute Encephalopathy  2-Acute Hypercapnic Respiratory failure   3- Opioid Overdose  4-Bipolar Disorder  5- COPD  6-CKD  7-Hypothyroidism  8-Anemia    Neuro  #Acute Encephalopathy  - Patient noted to be progressively lethargic and drowsy, responding to painful stimuli only  - Rapid response called, pinpoint pupils noted  - s/p Dilaudid 4mg PO, Buspirone, depakote, bupropion during the day (All home medications)  - s/p Narcan 0.4mg x 2  with adequate response , Back to baseline   - No indication of CT Hat present   - Elevated PCO2 adding upto depression   - Trending ABG. CO2 improving  - Bipap changed to 2L NC  - Closely monitor for mental status in ICU     Cardiovascular  Questionable CHF , f/u Echo     #Elevated troponin  Likely demand ischemia, already trended down   no active complaint     Pulmonary  #Acute Hypercapnic Respiratory Failure   PCO2 100 , likely due to medication affect and underlying TEJ/Obesity hypoventilation (undiagnosed)  Will start on BIPAP and recheck ABG     #COPD  Not on home medications   continue home medications       Infections  No acute infections , however, patient is producing green phlegm   No findings on CXR for now   will monitor    Nephro  #CKD  Cr slowly going up.   pt is making urine   s/p Lasix 40mg IVP in Am   F/u Repeat BMP  f/u renal US    Gastrointestinal  No acute issues   Senna for constipation     Heme  H/O DVT , not on AC for unknown reason   D-Dimer 217     Endocrine  FS Q6 while he is NPO      Prophylaxis   Guarded     Disposition   Transfer to ICU         61 year old man with morbid obesity from [facility] [place] amb[place] w/ walker has past medical hx of COPD not on O2 at home, hypothyroidism, anemia, left DVT, CKD, BPH, HLD, bipolar disorder, initially admitted for increased lethargy is admitted for hypercapnic respiratroy failure requiring BIPAP     1- Acute Encephalopathy  2-Acute Hypercapnic Respiratory failure   3- Opioid Overdose  4-Bipolar Disorder  5- COPD  6-CKD  7-Hypothyroidism  8-Anemia    [Neuro]  #Acute Encephalopathy  - Patient noted to be progressively lethargic and drowsy, responding to painful stimuli only  - Rapid response called, pinpoint pupils noted  - s/p Dilaudid 4mg PO, Buspirone, depakote, bupropion during the day (All home medications)  - s/p Narcan 0.4mg x 2  with adequate response , Back to baseline   - No indication of CT head present   - Elevated PCO2 adding upto depression   - Trending ABG. CO2 improving  - Bipap  - Closely monitor for mental status in ICU     [Cardiovascular]  - Questionable CHF , f/u Echo     #Elevated troponin  - Likely demand ischemia, already trended down   - no active complaint     [Pulmonary]  #Acute Hypercapnic Respiratory Failure   - PCO2 100 , likely due to medication affect and underlying TEJ/Obesity hypoventilation (undiagnosed)   - Will start on BIPAP and recheck ABG     #COPD  - Not on home medications   - continue home medications       [Infections]  - No acute infections , however, patient is producing green phlegm   - No findings on CXR for now   - will monitor    [Nephro]  #CKD  - Cr slowly going up.   - pt is making urine   - s/p Lasix 40mg IVP in Am   - F/u Repeat BMP  - f/u renal US    [Gastrointestinal]  No acute issues   Senna for constipation     [Heme]  - H/O DVT , not on AC for unknown reason   - D-Dimer 217     [Endocrine]  - FS Q6 while he is NPO    [MSK]  #Lymphedema  - bilateral  - Pain management consulted    [Prophylaxis]   - Guarded     [Disposition]  - Transfer to ICU    61 year old man with morbid obesity from [facility] [place] ambulates w/ walker has past medical hx of COPD not on O2 at home, hypothyroidism, anemia, left DVT, CKD, BPH, HLD, bipolar disorder, initially admitted for increased lethargy is admitted for hypercapnic respiratroy failure requiring BIPAP     1- Acute Encephalopathy  2-Acute Hypercapnic Respiratory failure   3- Opioid Overdose  4-Bipolar Disorder  5- COPD  6-CKD  7-Hypothyroidism  8-Anemia    [Neuro]  #Acute Encephalopathy  - Patient noted to be progressively lethargic and drowsy, responding to painful stimuli only  - Rapid response called, pinpoint pupils noted  - s/p Dilaudid 4mg PO, Buspirone, depakote, bupropion during the day (All home medications)  - s/p Narcan 0.4mg x 2  with adequate response , Back to baseline   - No indication of CT head present   - Elevated PCO2 adding upto depression   - Trending ABG. CO2 improving  - Bipap  - Closely monitor for mental status in ICU     [Cardiovascular]  - Questionable CHF , f/u Echo     #Elevated troponin  - Likely demand ischemia, already trended down   - no active complaint     [Pulmonary]  #Acute Hypercapnic Respiratory Failure   - PCO2 100 , likely due to medication affect and underlying TEJ/Obesity hypoventilation (undiagnosed)   - Will start on BIPAP and recheck ABG     #COPD  - Not on home medications   - continue home medications       [Infections]  - No acute infections , however, patient is producing green phlegm   - No findings on CXR for now   - will monitor    [Nephro]  #CKD  - Cr slowly going up.   - pt is making urine   - s/p Lasix 40mg IVP in Am   - F/u Repeat BMP  - f/u renal US    [Gastrointestinal]  No acute issues   Senna for constipation     [Heme]  #Macrocytic Anemia  - f/u B12, folate      - H/O DVT , not on AC for unknown reason   - D-Dimer 217     [Endocrine]  - FS Q6 while he is NPO    [MSK]  #Lymphedema  - bilateral  - Pain management consulted    [Prophylaxis]   - Guarded     [Disposition]  - Transfer to ICU    61 year old man with morbid obesity from [facility] [place] ambulates w/ walker has past medical hx of COPD not on O2 at home, hypothyroidism, anemia, left DVT, CKD, BPH, HLD, bipolar disorder, initially admitted for increased lethargy is admitted for hypercapnic respiratroy failure requiring BIPAP     1- Acute Encephalopathy  2-Acute Hypercapnic Respiratory failure   3- Opioid Overdose  4-Bipolar Disorder  5- COPD  6-CKD  7-Hypothyroidism  8-Anemia    [Neuro]  #Acute Encephalopathy  - Patient noted to be progressively lethargic and drowsy, responding to painful stimuli only  - Rapid response called, pinpoint pupils noted  - s/p Dilaudid 4mg PO, Buspirone, depakote, bupropion during the day (All home medications)  - s/p Narcan 0.4mg x 2  with adequate response , Back to baseline   - No indication of CT head present   - Elevated PCO2 adding upto depression   - Trending ABG. CO2 improving  - Bipap  - Closely monitor for mental status in ICU     [Cardiovascular]  - Questionable CHF , f/u Echo     #Elevated troponin  - Likely demand ischemia, already trended down   - no active complaint     [Pulmonary]  #Acute Hypercapnic Respiratory Failure   - PCO2 100 , likely due to medication affect and underlying TEJ/Obesity hypoventilation (undiagnosed)   - on BIPAP and recheck ABG     #COPD  - Not on home medications   - continue home medications       [Infections]  - No acute infections , however, patient is producing green phlegm   - No findings on CXR for now   - will monitor    [Nephro]  #CKD  - Cr slowly going up.   - pt is making urine   - Lasix 40mg IVP  - F/u Repeat BMP  - f/u renal US    [Gastrointestinal]  No acute issues   Senna for constipation     [Heme]  #Macrocytic Anemia  - f/u B12, folate      - H/O DVT , not on AC for unknown reason   - D-Dimer 217     [Endocrine]  - FS Q6 while he is NPO    [MSK]  #Lymphedema  - bilateral  - Pain management consulted    [Prophylaxis]   - Guarded     [Disposition]  - Transfer to ICU

## 2021-09-01 NOTE — PROGRESS NOTE ADULT - SUBJECTIVE AND OBJECTIVE BOX
NEPHROLOGY MEDICAL CARE, [facility] - Dr. Taj Valenzuela/ Dr. Hazel Solis/ Dr. Cosmo Root/ Dr. Fang Gifford    Patient was seen and examined at bedside.    CC: pt was lethargic overnight and transferred to ICU  pt was given dilaudid and responded with narcan.       Vital Signs Last 24 Hrs  T(C): 36.4 (01 Sep 2021 15:59), Max: 36.7 (31 Aug 2021 22:45)  T(F): 97.6 (01 Sep 2021 15:59), Max: 98.1 (31 Aug 2021 22:45)  HR: 87 (01 Sep 2021 16:00) (71 - 99)  BP: 120/63 (01 Sep 2021 16:00) (85/41 - 132/65)  BP(mean): 76 (01 Sep 2021 16:00) (52 - 84)  RR: 22 (01 Sep 2021 16:00) (12 - 29)  SpO2: 97% (01 Sep 2021 16:00) (89% - 100%)     @ : @ 07:00  --------------------------------------------------------  IN: 1244 mL / OUT: 1395 mL / NET: -151 mL     @ 07: @ 17:30  --------------------------------------------------------  IN: 170.4 mL / OUT: 1050 mL / NET: -879.6 mL        PHYSICAL EXAM:  General: No acute respiratory distress; obese  Eyes: conjunctiva and sclera clear  ENMT: Atraumatic, Normocephalic, supple, No JVD present. Moist mucous membranes  Respiratory: b/l poor air entry; No rales, rhonchi, wheezing  Cardiovasular: S1S2+; no m/r/g  Gastrointestinal: Soft, Non-tender, Nondistended; Bowel sounds present  : daley's cath with yellowish urine.  Neuro:  Awake, Alert & Oriented X3  Ext:  2+ Peripheral Pulses and 2+ pedal edema with chronic venous stasis; No Cyanosis  Skin: chronic vascular changes of the b/l legs.    MEDICATIONS:  MEDICATIONS  (STANDING):  ALBUTerol    90 MICROgram(s) HFA Inhaler 2 Puff(s) Inhalation <User Schedule>  aspirin enteric coated 81 milliGRAM(s) Oral daily  budesonide 160 MICROgram(s)/formoterol 4.5 MICROgram(s) Inhaler 2 Puff(s) Inhalation two times a day  buPROPion XL (24-Hour) . 150 milliGRAM(s) Oral daily  busPIRone 10 milliGRAM(s) Oral two times a day  cholecalciferol 1000 Unit(s) Oral daily  diVALproex  milliGRAM(s) Oral <User Schedule>  ferrous    sulfate 325 milliGRAM(s) Oral daily  finasteride 5 milliGRAM(s) Oral daily  folic acid 1 milliGRAM(s) Oral daily  furosemide   Injectable 40 milliGRAM(s) IV Push daily  heparin   Injectable 5000 Unit(s) SubCutaneous every 8 hours  levothyroxine Injectable 94 MICROGram(s) IV Push <User Schedule>  nicotine -  14 mG/24Hr(s) Patch 1 patch Transdermal daily  pantoprazole    Tablet 40 milliGRAM(s) Oral before breakfast  polyethylene glycol 3350 17 Gram(s) Oral daily  risperiDONE   Tablet 2 milliGRAM(s) Oral <User Schedule>  senna 2 Tablet(s) Oral at bedtime  sevelamer carbonate 800 milliGRAM(s) Oral three times a day with meals  simvastatin 20 milliGRAM(s) Oral at bedtime  tamsulosin 0.4 milliGRAM(s) Oral at bedtime    MEDICATIONS  (PRN):  acetaminophen   Tablet .. 650 milliGRAM(s) Oral every 6 hours PRN Temp greater or equal to 38C (100.4F), Mild Pain (1 - 3)          LABS:                        11.4   9.17  )-----------( 195      ( 01 Sep 2021 05:22 )             37.4     09-01    142  |  111<H>  |  49<H>  ----------------------------<  99  4.9   |  25  |  4.31<H>    Ca    8.7      01 Sep 2021 05:22  Phos  6.6       Mg     2.6         TPro  7.2  /  Alb  2.4<L>  /  TBili  0.2  /  DBili  x   /  AST  11  /  ALT  12  /  AlkPhos  85        Urinalysis Basic - ( 30 Aug 2021 18:47 )    Color: Yellow / Appearance: Clear / S.015 / pH: x  Gluc: x / Ketone: Negative  / Bili: Negative / Urobili: Negative   Blood: x / Protein: 100 / Nitrite: Negative   Leuk Esterase: Negative / RBC: 0-2 /HPF / WBC 3-5 /HPF   Sq Epi: x / Non Sq Epi: Occasional /HPF / Bacteria: Trace /HPF      Vitamin D, 25-Hydroxy: 28.4 ng/mL ( @ 10:19)  Intact PTH: 330 pg/mL ( @ 10:19)  Magnesium, Serum: 2.6 mg/dL ( @ 05:22)  Phosphorus Level, Serum: 6.6 mg/dL ( @ 05:22)    Urine studies    PTH and Vit D:  Vitamin D, 25-Hydroxy: 28.4 ng/mL ( @ 10:19)  Intact PTH: 330 pg/mL ( @ 10:19)

## 2021-09-01 NOTE — PROGRESS NOTE ADULT - ASSESSMENT
1. SHEYLA due to r/o ATN due to possible CRS  -scr is worsening but stable electrolytes; Patient has increased UO with lasix. on lasix 40mg iv daily.   -UA shows some proteinuria. -resend urine lytes (uosm, urine sodium, urine creatinine, chloride, potassium), spot protein to creatinine ratio  -order renal sono to assess kidney size and r/o hydronephrosis when clinically stable.  -Adjust meds to eGFR and avoid IV Gadolinium contrast,NSAIDs, and phosphate enema.  -Monitor I/O's daily.   -Monitor SMA daily.  2. CKD stage 4 most likely due to ischemic nephropathy  -order renal sono and baseline scr around 3.1mg/dL  -Keep patient euvolemic and renal diet  -Avoid Nephrotoxic Meds/ Agents such as (NSAIDs, IV contrast, Aminoglycosides such as gentamicin, -Gadolinium contrast, Phosphate containing enemas, etc..)  -Adjust Medications according to eGFR  3. Anemia:  -on iron tabs.   -F/u CBC daily  -transfuse if HB < 7.0.  3. Mineral Bone Disease:  -phos is elevated and on renvela 1 tab tid and Vitamin 25 OH is low and PTH intact is 330, add calcitriol 0.25mcg daily.   4. Respiratory Acidosis: on bipap and f/u echo. on iv lasix. cardiology consulted.   Patient is critically ill. Time Spent: 35mins.  Discussed with patient in detail regarding the renal plan and care  Discussed the assessment and plan with Primary Team/Nurse

## 2021-09-01 NOTE — CONSULT NOTE ADULT - SUBJECTIVE AND OBJECTIVE BOX
Source of information: , Chart review  Patient language: English  : n/a    CC:      This is a 61yMale patient who presents to the hospital for Patient is a 61y old  Male who presents with a chief complaint of SHEYLA ON CKD, DEMAND ISCHEMIA, AMBULATORY DYSFUNCTION (01 Sep 2021 17:30)  .       Patient stated goal for pain control: to be able to take deep breaths, get out of bed to chair and ambulate with tolerable pain control.     PAIN SCORE:         SCALE USED: (1-10 NRS)      PAST MEDICAL & SURGICAL HISTORY:  Hypothyroid    Hyperlipidemia    Ambulatory dysfunction    Anemia    History of BPH    CKD (chronic kidney disease)    Chronic obstructive pulmonary disease (COPD)    Bipolar disorder        FAMILY HISTORY:        SOCIAL HISTORY:  [ ] Denies Smoking, Alcohol, or Drug Use    Allergies    No Known Allergies    Intolerances        MEDICATIONS:    MEDICATIONS  (STANDING):  ALBUTerol    90 MICROgram(s) HFA Inhaler 2 Puff(s) Inhalation <User Schedule>  aspirin enteric coated 81 milliGRAM(s) Oral daily  budesonide 160 MICROgram(s)/formoterol 4.5 MICROgram(s) Inhaler 2 Puff(s) Inhalation two times a day  buPROPion XL (24-Hour) . 150 milliGRAM(s) Oral daily  busPIRone 10 milliGRAM(s) Oral two times a day  calcitriol   Capsule 0.25 MICROGram(s) Oral daily  cholecalciferol 1000 Unit(s) Oral daily  diVALproex  milliGRAM(s) Oral <User Schedule>  ferrous    sulfate 325 milliGRAM(s) Oral daily  finasteride 5 milliGRAM(s) Oral daily  folic acid 1 milliGRAM(s) Oral daily  furosemide   Injectable 40 milliGRAM(s) IV Push daily  heparin   Injectable 5000 Unit(s) SubCutaneous every 8 hours  levothyroxine Injectable 94 MICROGram(s) IV Push <User Schedule>  nicotine -  14 mG/24Hr(s) Patch 1 patch Transdermal daily  pantoprazole    Tablet 40 milliGRAM(s) Oral before breakfast  polyethylene glycol 3350 17 Gram(s) Oral daily  risperiDONE   Tablet 2 milliGRAM(s) Oral <User Schedule>  senna 2 Tablet(s) Oral at bedtime  sevelamer carbonate 800 milliGRAM(s) Oral three times a day with meals  simvastatin 20 milliGRAM(s) Oral at bedtime  tamsulosin 0.4 milliGRAM(s) Oral at bedtime    MEDICATIONS  (PRN):  acetaminophen   Tablet .. 650 milliGRAM(s) Oral every 6 hours PRN Temp greater or equal to 38C (100.4F), Mild Pain (1 - 3)      Vital Signs Last 24 Hrs  T(C): 36.4 (01 Sep 2021 15:59), Max: 36.7 (31 Aug 2021 22:45)  T(F): 97.6 (01 Sep 2021 15:59), Max: 98.1 (31 Aug 2021 22:45)  HR: 87 (01 Sep 2021 16:00) (71 - 99)  BP: 120/63 (01 Sep 2021 16:00) (85/41 - 132/65)  BP(mean): 76 (01 Sep 2021 16:00) (52 - 84)  RR: 22 (01 Sep 2021 16:00) (12 - 29)  SpO2: 97% (01 Sep 2021 16:00) (89% - 100%)    LABS:                          11.4   9.17  )-----------( 195      ( 01 Sep 2021 05:22 )             37.4     09-    142  |  111<H>  |  49<H>  ----------------------------<  99  4.9   |  25  |  4.31<H>    Ca    8.7      01 Sep 2021 05:22  Phos  6.6     09-  Mg     2.6     -    TPro  7.2  /  Alb  2.4<L>  /  TBili  0.2  /  DBili  x   /  AST  11  /  ALT  12  /  AlkPhos  85  09-01      LIVER FUNCTIONS - ( 01 Sep 2021 05:22 )  Alb: 2.4 g/dL / Pro: 7.2 g/dL / ALK PHOS: 85 U/L / ALT: 12 U/L DA / AST: 11 U/L / GGT: x           Urinalysis Basic - ( 30 Aug 2021 18:47 )    Color: Yellow / Appearance: Clear / S.015 / pH: x  Gluc: x / Ketone: Negative  / Bili: Negative / Urobili: Negative   Blood: x / Protein: 100 / Nitrite: Negative   Leuk Esterase: Negative / RBC: 0-2 /HPF / WBC 3-5 /HPF   Sq Epi: x / Non Sq Epi: Occasional /HPF / Bacteria: Trace /HPF      CAPILLARY BLOOD GLUCOSE      POCT Blood Glucose.: 93 mg/dL (01 Sep 2021 17:19)  POCT Blood Glucose.: 101 mg/dL (01 Sep 2021 11:30)  POCT Blood Glucose.: 138 mg/dL (31 Aug 2021 23:08)  POCT Blood Glucose.: 146 mg/dL (31 Aug 2021 20:52)      Radiology:    Drug Screen:        REVIEW OF SYSTEMS:  CONSTITUTIONAL: No fever or fatigue  RESPIRATORY: No cough, wheezing, chills or hemoptysis; No shortness of breath  CARDIOVASCULAR: No chest pain, palpitations, dizziness, or leg swelling  GASTROINTESTINAL: No abdominal or epigastric pain. No nausea, vomiting; No diarrhea or constipation.   GENITOURINARY: No dysuria, frequency, hematuria, retention or incontinence  MUSCULOSKELETAL: No joint pain or swelling; No muscle, back, or extremity pain, no upper or lower motor strength weakness, no saddle anesthesia, bowel/bladder incontinence, no falls   NEURO: No headaches, No numbness/tingling b/l LE, No weakness  PSYCHIATRIC: No depression, anxiety, mood swings, or difficulty sleeping    PHYSICAL EXAM:  GENERAL:  Alert & Oriented X3, NAD, Good concentration  CHEST/LUNG: Clear to auscultation bilaterally; No rales, rhonchi, wheezing, or rubs  HEART: Regular rate and rhythm; No murmurs, rubs, or gallops  ABDOMEN: Soft, Nontender, Nondistended; Bowel sounds present  EXTREMITIES:  2+ Peripheral Pulses, No cyanosis, or edema  MUSCULOSKELETAL: + decreased rom Motor Strength 5/5 B/L upper and lower extremities; moves all extremities equally against gravity; ROM intact; negative SLR  SKIN: No rashes or lesions    Risk factors associated with adverse outcomes related to opioid treatment  [ ]  Concurrent benzodiazepine use  [ ]  History/ Active substance use or alcohol use disorder  [ ] Psychiatric co-morbidity  [ ] Sleep apnea  [ ] COPD  [ ] BMI> 35  [ ] Liver dysfunction  [ ] Renal dysfunction  [ ] CHF  [ ] Smoker  [ ]  Age > 60 years    [ ]  [place]  Reviewed and Copied to Chart. See below.    Plan of care and goal oriented pain management treatment options were discussed with patient and /or primary care giver; all questions and concerns were addressed and care was aligned with patient's wishes.    Educated patient on goal oriented pain management treatment options     21 @ 17:40 Source of information: , Chart review, patient  Patient language: English  : n/a    CC: Patient is a 61y old  Male who presents with a chief complaint of SHEYLA ON CKD, DEMAND ISCHEMIA, AMBULATORY DYSFUNCTION (01 Sep 2021 17:39)      HPI:  61 year old man with morbid obesity from [facility] [place] ambulates w/ walker has past medical hx of COPD not on O2 at home, hypothyroidism, anemia, left DVT, CKD, BPH, HLD, bipolar disorder, ambulatory dysfunction, lymphedema was BIBEMS as patient was complaining of worsening lower extremity weakness, lightheaded and overall not feeling well for one day. Of note, patient with slurred speech at baseline since childhood and known ambulatory dysfunction reason why he is on longterm. Patient denies chest pain, palpitations, sob or other symptoms.    Pt with copd, dvt, bipolar d/o and chronic pain in legs.  Pt recently given script for hydromorphone 4mg po once a day on 8/27/2021.  Believe that pt has not been opioids in past.  + ckd which has worsened.  Pt admitted with weakness, and not feeling well. during admission - pt became increasingly lethargic and unresponsive and needed to receive narcan.  PCO2 high  pt now on bipap.      PAIN SCORE:   4/10    SCALE USED: (1-10 VNRS)    PAST MEDICAL & SURGICAL HISTORY:  Hypothyroid    Hyperlipidemia    Ambulatory dysfunction    Anemia    History of BPH    CKD (chronic kidney disease)    Chronic obstructive pulmonary disease (COPD)    Bipolar disorder        FAMILY HISTORY:        SOCIAL HISTORY:  [ x] Denies Smoking, Alcohol, or Drug Use    Allergies    No Known Allergies    Intolerances        MEDICATIONS:    MEDICATIONS  (STANDING):  ALBUTerol    90 MICROgram(s) HFA Inhaler 2 Puff(s) Inhalation <User Schedule>  aspirin enteric coated 81 milliGRAM(s) Oral daily  budesonide 160 MICROgram(s)/formoterol 4.5 MICROgram(s) Inhaler 2 Puff(s) Inhalation two times a day  buPROPion XL (24-Hour) . 150 milliGRAM(s) Oral daily  busPIRone 10 milliGRAM(s) Oral two times a day  calcitriol   Capsule 0.25 MICROGram(s) Oral daily  cholecalciferol 1000 Unit(s) Oral daily  diVALproex  milliGRAM(s) Oral <User Schedule>  ferrous    sulfate 325 milliGRAM(s) Oral daily  finasteride 5 milliGRAM(s) Oral daily  folic acid 1 milliGRAM(s) Oral daily  furosemide   Injectable 40 milliGRAM(s) IV Push daily  heparin   Injectable 5000 Unit(s) SubCutaneous every 8 hours  levothyroxine Injectable 94 MICROGram(s) IV Push <User Schedule>  nicotine -  14 mG/24Hr(s) Patch 1 patch Transdermal daily  pantoprazole    Tablet 40 milliGRAM(s) Oral before breakfast  polyethylene glycol 3350 17 Gram(s) Oral daily  risperiDONE   Tablet 2 milliGRAM(s) Oral <User Schedule>  senna 2 Tablet(s) Oral at bedtime  sevelamer carbonate 800 milliGRAM(s) Oral three times a day with meals  simvastatin 20 milliGRAM(s) Oral at bedtime  tamsulosin 0.4 milliGRAM(s) Oral at bedtime    MEDICATIONS  (PRN):  acetaminophen   Tablet .. 650 milliGRAM(s) Oral every 6 hours PRN Temp greater or equal to 38C (100.4F), Mild Pain (1 - 3)      Vital Signs Last 24 Hrs  T(C): 37.1 (01 Sep 2021 19:00), Max: 37.1 (01 Sep 2021 19:00)  T(F): 98.7 (01 Sep 2021 19:00), Max: 98.7 (01 Sep 2021 19:00)  HR: 82 (01 Sep 2021 20:20) (71 - 98)  BP: 97/67 (01 Sep 2021 20:18) (73/38 - 132/65)  BP(mean): 75 (01 Sep 2021 20:18) (46 - 103)  RR: 21 (01 Sep 2021 20:18) (12 - 29)  SpO2: 98% (01 Sep 2021 20:20) (89% - 100%)    LABS:                          11.4   9.17  )-----------( 195      ( 01 Sep 2021 05:22 )             37.4     09-01    142  |  111<H>  |  49<H>  ----------------------------<  99  4.9   |  25  |  4.31<H>    Ca    8.7      01 Sep 2021 05:22  Phos  6.6     09-01  Mg     2.6     09-01    TPro  7.2  /  Alb  2.4<L>  /  TBili  0.2  /  DBili  x   /  AST  11  /  ALT  12  /  AlkPhos  85  09-01      LIVER FUNCTIONS - ( 01 Sep 2021 05:22 )  Alb: 2.4 g/dL / Pro: 7.2 g/dL / ALK PHOS: 85 U/L / ALT: 12 U/L DA / AST: 11 U/L / GGT: x             CAPILLARY BLOOD GLUCOSE      POCT Blood Glucose.: 93 mg/dL (01 Sep 2021 17:19)  POCT Blood Glucose.: 101 mg/dL (01 Sep 2021 11:30)  POCT Blood Glucose.: 138 mg/dL (31 Aug 2021 23:08)      REVIEW OF SYSTEMS:  CONSTITUTIONAL: No fever or fatigue  RESPIRATORY: + copd  CARDIOVASCULAR: No chest pain, palpitations, dizziness, or leg swelling  GASTROINTESTINAL: + obesity   GENITOURINARY: No dysuria, frequency, hematuria, retention or incontinence  MUSCULOSKELETAL: + pain in legs  NEURO: No weakness, no numbness   PSYCHIATRIC: No depression, anxiety, mood swings, or difficulty sleeping    PHYSICAL EXAM:  GENERAL:  Alert & Oriented X3, NAD  CHEST/LUNG: decreased breath sounds on bipap  HEART: Regular rate and rhythm; No murmurs, rubs, or gallops  ABDOMEN: Soft, Nontender, Nondistended; Bowel sounds present  obese  : no incontinence, no flank pain  EXTREMITIES:  2+ Peripheral Pulses, No cyanosis, or edema  MUSCULOSKELETAL:+ chronic lymphedema + pain le    NEUROLOGICAL: awake and alert and oriented   SKIN: No rashes or lesions    Radiology:  < from: Xray Chest 1 View- PORTABLE-Routine (Xray Chest 1 View- PORTABLE-Routine in AM.) (09.01.21 @ 07:00) >    EXAM:  XR CHEST PORTABLE ROUTINE 1V                            PROCEDURE DATE:  09/01/2021          INTERPRETATION:  Chest one view    HISTORY: Pulmonary edema    COMPARISON STUDY: 8/30/2021    Frontal expiratory view of the chest shows the heart to be similar in size. The lungs show mild bilateral atelectasis. Pulmonary vascularity is within normal limits and there is no evidence of pneumothorax nor pleural effusion. Elevated left hemidiaphragm is again noted.    IMPRESSION:  Atelectasis. No evidence of pulmonary edema.    < end of copied text >      Drug Screen:  aspirin enteric coated 81 milliGRAM(s) Oral daily  heparin   Injectable 5000 Unit(s) SubCutaneous every 8 hours          ORT Score   Family Hx of substance abuse	Female	Male  Alcohol 	                                              1              3  Illegal drugs	                                      2              3  Rx drugs                                               4	      4    Personal Hx of substance abuse		  Alcohol 	                                               3	      3  Illegal drugs                                  	       4	      4  Rx drugs                                                5	      5  Age between 16- 45 years	               1             1  hx preadolescent sexual abuse	       3	      0  Psychological disease		  ADD, OCD, bipolar, schizophrenia	2	      2  Depression                                    	1	      1  Score totals 		0  		  a score of 3 or lower indicates low risk for opioid abuse		  a score of 4-7 indicates moderate risk for opioid abuse		  a score of 8 or higher indicates high risk for opioid abuse	    Risk factors associated with adverse outcomes related to opioid treatment  [ ]  Concurrent benzodiazepine use  [ ]  History/ Active substance use or alcohol use disorder  [ ] Psychiatric co-morbidity  [ ] Sleep apnea  [x ] COPD  [ ] BMI> 35  [ ] Liver dysfunction  [ x] Renal dysfunction  [ ] CHF  [ ] Smoker  x[ ]  Age > 60 years      [x ]  [place]  Reviewed and Copied to Chart. See below.

## 2021-09-01 NOTE — CONSULT NOTE ADULT - PROBLEM SELECTOR RECOMMENDATION 9
Pt with chronic le pain.  Pt recently prescribed hydromorphone 4mg po daily.  Pt with copd and shruthi on ckd. Pt is also most likley opioid naive.  Hydromorphone with copd led to increased risk of resp depression  nonopioid recc  - acetaminophen 1000mg po q 8 hours prn moderate pian  - no nsaids due to ckd  - lidocaine patches to LE  - will avoid gabapentin at this time as pt with shruthi on ckd  opioid recc   - none at this time.  pt has not been on opioids long term so no risk of withdrawal.   bowel regimen  - senna  Will refer to pain mgt as outpt

## 2021-09-01 NOTE — CONSULT NOTE ADULT - ASSESSMENT
l Drug Utilization Report  Search Terms: bear abdul, 1960Search Date: 09/01/2021 17:47:17 PM  The Drug Utilization Report below displays all of the controlled substance prescriptions, if any, that your patient has filled in the last twelve months. The information displayed on this report is compiled from pharmacy submissions to the Department, and accurately reflects the information as submitted by the pharmacies.    This report was requested by: Eula Olivier | Reference #: [id number]    Others' Prescriptions  Patient Name: Bear AbdulBirth Date: 1960  Address: [address] [place], [place] [address]Jcs: Male  Rx Written	Rx Dispensed	Drug	Quantity	Days Supply	Prescriber Name	Prescriber Joycelyn #	Payment Method  08/27/2021	08/27/2021	hydromorphone 4 mg tablet	30	30	Owen Phillips	[id number]	[facility]  11/06/2020	11/07/2020	alprazolam 0.5 mg tablet	5	5	Owen Phillips	[id number]	[facility]
1. SHEYLA due to r/o ATN due to possible CRS  -scr is worsening but stable electrolytes; Patient has increased UO with lasix. give lasix as needed.   -UA shows some proteinuria. -recommend: urine lytes (uosm, urine sodium, urine creatinine, chloride, potassium), spot protein to creatinine ratio  -order renal sono to assess kidney size and r/o hydronephrosis.   -Adjust meds to eGFR and avoid IV Gadolinium contrast,NSAIDs, and phosphate enema.  -Monitor I/O's daily.   -Monitor SMA daily.  2. CKD stage 4 most likely due to ischemic nephropathy  -order renal sono and baseline scr around 3.1mg/dL  -Keep patient euvolemic and renal diet  -Avoid Nephrotoxic Meds/ Agents such as (NSAIDs, IV contrast, Aminoglycosides such as gentamicin, -Gadolinium contrast, Phosphate containing enemas, etc..)  -Adjust Medications according to eGFR  3. Anemia:  -on iron tabs.   -F/u CBC daily  -transfuse if HB < 7.0.  3. Mineral Bone Disease:  -phos is elevated and add renvela 1 tab tid and check Vitamin 25 OH, and PTH intact in am  4. generalized weakness and sob: echo pending r/o CHF. cardiology consulted.     Discussed the assessment and plan with Primary Team/Nurse

## 2021-09-01 NOTE — PROGRESS NOTE ADULT - SUBJECTIVE AND OBJECTIVE BOX
INTERVAL HPI/OVERNIGHT EVENTS:  Seen and examined at bedside.    PRESSORS: [  ] YES [ X ] NO  WHICH:    Antimicrobial:    Cardiovascular:  phenylephrine    Infusion 0.1 MICROgram(s)/kG/Min IV Continuous <Continuous>  tamsulosin 0.4 milliGRAM(s) Oral at bedtime    Pulmonary:  ALBUTerol    90 MICROgram(s) HFA Inhaler 2 Puff(s) Inhalation <User Schedule>  budesonide 160 MICROgram(s)/formoterol 4.5 MICROgram(s) Inhaler 2 Puff(s) Inhalation two times a day    Hematalogic:  aspirin enteric coated 81 milliGRAM(s) Oral daily  heparin   Injectable 5000 Unit(s) SubCutaneous every 8 hours    Other:  acetaminophen   Tablet .. 650 milliGRAM(s) Oral every 6 hours PRN  buPROPion XL (24-Hour) . 150 milliGRAM(s) Oral daily  busPIRone 10 milliGRAM(s) Oral two times a day  cholecalciferol 1000 Unit(s) Oral daily  diVALproex  milliGRAM(s) Oral <User Schedule>  ferrous    sulfate 325 milliGRAM(s) Oral daily  finasteride 5 milliGRAM(s) Oral daily  folic acid 1 milliGRAM(s) Oral daily  levothyroxine Injectable 94 MICROGram(s) IV Push <User Schedule>  pantoprazole    Tablet 40 milliGRAM(s) Oral before breakfast  polyethylene glycol 3350 17 Gram(s) Oral daily  risperiDONE   Tablet 2 milliGRAM(s) Oral <User Schedule>  senna 2 Tablet(s) Oral at bedtime  sevelamer carbonate 800 milliGRAM(s) Oral three times a day with meals  simvastatin 20 milliGRAM(s) Oral at bedtime  sodium chloride 0.9%. 1000 milliLiter(s) IV Continuous <Continuous>    acetaminophen   Tablet .. 650 milliGRAM(s) Oral every 6 hours PRN  ALBUTerol    90 MICROgram(s) HFA Inhaler 2 Puff(s) Inhalation <User Schedule>  aspirin enteric coated 81 milliGRAM(s) Oral daily  budesonide 160 MICROgram(s)/formoterol 4.5 MICROgram(s) Inhaler 2 Puff(s) Inhalation two times a day  buPROPion XL (24-Hour) . 150 milliGRAM(s) Oral daily  busPIRone 10 milliGRAM(s) Oral two times a day  cholecalciferol 1000 Unit(s) Oral daily  diVALproex  milliGRAM(s) Oral <User Schedule>  ferrous    sulfate 325 milliGRAM(s) Oral daily  finasteride 5 milliGRAM(s) Oral daily  folic acid 1 milliGRAM(s) Oral daily  heparin   Injectable 5000 Unit(s) SubCutaneous every 8 hours  levothyroxine Injectable 94 MICROGram(s) IV Push <User Schedule>  pantoprazole    Tablet 40 milliGRAM(s) Oral before breakfast  phenylephrine    Infusion 0.1 MICROgram(s)/kG/Min IV Continuous <Continuous>  polyethylene glycol 3350 17 Gram(s) Oral daily  risperiDONE   Tablet 2 milliGRAM(s) Oral <User Schedule>  senna 2 Tablet(s) Oral at bedtime  sevelamer carbonate 800 milliGRAM(s) Oral three times a day with meals  simvastatin 20 milliGRAM(s) Oral at bedtime  sodium chloride 0.9%. 1000 milliLiter(s) IV Continuous <Continuous>  tamsulosin 0.4 milliGRAM(s) Oral at bedtime    Drug Dosing Weight  Height (cm): 172.7 (30 Aug 2021 08:52)  Weight (kg): 135.1 (31 Aug 2021 22:45)  BMI (kg/m2): 45.3 (31 Aug 2021 22:45)  BSA (m2): 2.42 (31 Aug 2021 22:45)    CENTRAL LINE: [ ] YES [ ] NO  LOCATION:   DATE INSERTED:  REMOVE: [ ] YES [ ] NO  EXPLAIN:    HATFIELD: [ ] YES [ ] NO    DATE INSERTED:  REMOVE:  [ ] YES [ ] NO  EXPLAIN:    A-LINE:  [ ] YES [ ] NO  LOCATION:   DATE INSERTED:  REMOVE:  [ ] YES [ ] NO  EXPLAIN:    PMH -reviewed admission note, no change since admission  PAST MEDICAL & SURGICAL HISTORY:  Hypothyroid    Hyperlipidemia    Ambulatory dysfunction    Anemia    History of BPH    CKD (chronic kidney disease)    Chronic obstructive pulmonary disease (COPD)    Bipolar disorder        ICU Vital Signs Last 24 Hrs  T(C): 36.1 (01 Sep 2021 04:00), Max: 37.7 (31 Aug 2021 15:18)  T(F): 97 (01 Sep 2021 04:00), Max: 99.8 (31 Aug 2021 15:18)  HR: 79 (01 Sep 2021 06:45) (79 - 99)  BP: 105/59 (01 Sep 2021 06:45) (85/41 - 127/50)  BP(mean): 70 (01 Sep 2021 06:45) (52 - 72)  ABP: --  ABP(mean): --  RR: 17 (01 Sep 2021 06:45) (14 - 27)  SpO2: 93% (01 Sep 2021 06:45) (89% - 100%)      ABG - ( 01 Sep 2021 04:16 )  pH, Arterial: 7.14  pH, Blood: x     /  pCO2: 75    /  pO2: 88    / HCO3: 26    / Base Excess: -5.1  /  SaO2: 98                    08 @ 07:  -   @ 07:00  --------------------------------------------------------  IN: 1158.8 mL / OUT: 1295 mL / NET: -136.2 mL            PHYSICAL EXAM:  GENERAL: NAD, speaks in full sentences, no signs of respiratory distress  HEAD:  Atraumatic, Normocephalic  EYES: EOMI, PERRLA, conjunctiva and sclera clear  NECK: Supple, No JVD  CHEST/LUNG: Clear to auscultation bilaterally; No wheeze; No crackles; No accessory muscles used  HEART: Regular rate and rhythm; No murmurs;   ABDOMEN: Soft, Nontender, Nondistended; Bowel sounds present; No guarding  EXTREMITIES:  2+ Peripheral Pulses, No cyanosis or edema  PSYCH: AAOx3  NEUROLOGY: non-focal  SKIN: No rashes or lesions    LABS:  CBC Full  -  ( 01 Sep 2021 05:22 )  WBC Count : 9.17 K/uL  RBC Count : 3.52 M/uL  Hemoglobin : 11.4 g/dL  Hematocrit : 37.4 %  Platelet Count - Automated : 195 K/uL  Mean Cell Volume : 106.3 fl  Mean Cell Hemoglobin : 32.4 pg  Mean Cell Hemoglobin Concentration : 30.5 gm/dL  Auto Neutrophil # : 6.68 K/uL  Auto Lymphocyte # : 0.94 K/uL  Auto Monocyte # : 1.39 K/uL  Auto Eosinophil # : 0.01 K/uL  Auto Basophil # : 0.02 K/uL  Auto Neutrophil % : 72.8 %  Auto Lymphocyte % : 10.3 %  Auto Monocyte % : 15.2 %  Auto Eosinophil % : 0.1 %  Auto Basophil % : 0.2 %        142  |  111<H>  |  49<H>  ----------------------------<  99  4.9   |  25  |  4.31<H>    Ca    8.7      01 Sep 2021 05:22  Phos  6.6       Mg     2.6         TPro  7.2  /  Alb  2.4<L>  /  TBili  0.2  /  DBili  x   /  AST  11  /  ALT  12  /  AlkPhos  85        Urinalysis Basic - ( 30 Aug 2021 18:47 )    Color: Yellow / Appearance: Clear / S.015 / pH: x  Gluc: x / Ketone: Negative  / Bili: Negative / Urobili: Negative   Blood: x / Protein: 100 / Nitrite: Negative   Leuk Esterase: Negative / RBC: 0-2 /HPF / WBC 3-5 /HPF   Sq Epi: x / Non Sq Epi: Occasional /HPF / Bacteria: Trace /HPF      Culture Results:   <10,000 CFU/mL Normal Urogenital Ivelisse ( @ 21:13)      RADIOLOGY & ADDITIONAL STUDIES REVIEWED:  ***    [ ]GOALS OF CARE DISCUSSION WITH PATIENT/FAMILY/PROXY:    CRITICAL CARE TIME SPENT: 35 minutes INTERVAL HPI/OVERNIGHT EVENTS:  Seen and examined at bedside.    PRESSORS: [  ] YES [ X ] NO  WHICH:    Antimicrobial:    Cardiovascular:  phenylephrine    Infusion 0.1 MICROgram(s)/kG/Min IV Continuous <Continuous>  tamsulosin 0.4 milliGRAM(s) Oral at bedtime    Pulmonary:  ALBUTerol    90 MICROgram(s) HFA Inhaler 2 Puff(s) Inhalation <User Schedule>  budesonide 160 MICROgram(s)/formoterol 4.5 MICROgram(s) Inhaler 2 Puff(s) Inhalation two times a day    Hematalogic:  aspirin enteric coated 81 milliGRAM(s) Oral daily  heparin   Injectable 5000 Unit(s) SubCutaneous every 8 hours    Other:  acetaminophen   Tablet .. 650 milliGRAM(s) Oral every 6 hours PRN  buPROPion XL (24-Hour) . 150 milliGRAM(s) Oral daily  busPIRone 10 milliGRAM(s) Oral two times a day  cholecalciferol 1000 Unit(s) Oral daily  diVALproex  milliGRAM(s) Oral <User Schedule>  ferrous    sulfate 325 milliGRAM(s) Oral daily  finasteride 5 milliGRAM(s) Oral daily  folic acid 1 milliGRAM(s) Oral daily  levothyroxine Injectable 94 MICROGram(s) IV Push <User Schedule>  pantoprazole    Tablet 40 milliGRAM(s) Oral before breakfast  polyethylene glycol 3350 17 Gram(s) Oral daily  risperiDONE   Tablet 2 milliGRAM(s) Oral <User Schedule>  senna 2 Tablet(s) Oral at bedtime  sevelamer carbonate 800 milliGRAM(s) Oral three times a day with meals  simvastatin 20 milliGRAM(s) Oral at bedtime  sodium chloride 0.9%. 1000 milliLiter(s) IV Continuous <Continuous>    acetaminophen   Tablet .. 650 milliGRAM(s) Oral every 6 hours PRN  ALBUTerol    90 MICROgram(s) HFA Inhaler 2 Puff(s) Inhalation <User Schedule>  aspirin enteric coated 81 milliGRAM(s) Oral daily  budesonide 160 MICROgram(s)/formoterol 4.5 MICROgram(s) Inhaler 2 Puff(s) Inhalation two times a day  buPROPion XL (24-Hour) . 150 milliGRAM(s) Oral daily  busPIRone 10 milliGRAM(s) Oral two times a day  cholecalciferol 1000 Unit(s) Oral daily  diVALproex  milliGRAM(s) Oral <User Schedule>  ferrous    sulfate 325 milliGRAM(s) Oral daily  finasteride 5 milliGRAM(s) Oral daily  folic acid 1 milliGRAM(s) Oral daily  heparin   Injectable 5000 Unit(s) SubCutaneous every 8 hours  levothyroxine Injectable 94 MICROGram(s) IV Push <User Schedule>  pantoprazole    Tablet 40 milliGRAM(s) Oral before breakfast  phenylephrine    Infusion 0.1 MICROgram(s)/kG/Min IV Continuous <Continuous>  polyethylene glycol 3350 17 Gram(s) Oral daily  risperiDONE   Tablet 2 milliGRAM(s) Oral <User Schedule>  senna 2 Tablet(s) Oral at bedtime  sevelamer carbonate 800 milliGRAM(s) Oral three times a day with meals  simvastatin 20 milliGRAM(s) Oral at bedtime  sodium chloride 0.9%. 1000 milliLiter(s) IV Continuous <Continuous>  tamsulosin 0.4 milliGRAM(s) Oral at bedtime    Drug Dosing Weight  Height (cm): 172.7 (30 Aug 2021 08:52)  Weight (kg): 135.1 (31 Aug 2021 22:45)  BMI (kg/m2): 45.3 (31 Aug 2021 22:45)  BSA (m2): 2.42 (31 Aug 2021 22:45)    CENTRAL LINE: [ ] YES [ ] NO  LOCATION:   DATE INSERTED:  REMOVE: [ ] YES [ ] NO  EXPLAIN:    HATFIELD: [ ] YES [ ] NO    DATE INSERTED:  REMOVE:  [ ] YES [ ] NO  EXPLAIN:    A-LINE:  [ ] YES [ ] NO  LOCATION:   DATE INSERTED:  REMOVE:  [ ] YES [ ] NO  EXPLAIN:    PMH -reviewed admission note, no change since admission  PAST MEDICAL & SURGICAL HISTORY:  Hypothyroid    Hyperlipidemia    Ambulatory dysfunction    Anemia    History of BPH    CKD (chronic kidney disease)    Chronic obstructive pulmonary disease (COPD)    Bipolar disorder        ICU Vital Signs Last 24 Hrs  T(C): 36.1 (01 Sep 2021 04:00), Max: 37.7 (31 Aug 2021 15:18)  T(F): 97 (01 Sep 2021 04:00), Max: 99.8 (31 Aug 2021 15:18)  HR: 79 (01 Sep 2021 06:45) (79 - 99)  BP: 105/59 (01 Sep 2021 06:45) (85/41 - 127/50)  BP(mean): 70 (01 Sep 2021 06:45) (52 - 72)  ABP: --  ABP(mean): --  RR: 17 (01 Sep 2021 06:45) (14 - 27)  SpO2: 93% (01 Sep 2021 06:45) (89% - 100%)      ABG - ( 01 Sep 2021 04:16 )  pH, Arterial: 7.14  pH, Blood: x     /  pCO2: 75    /  pO2: 88    / HCO3: 26    / Base Excess: -5.1  /  SaO2: 98                    08 @ 07:01  -   @ 07:00  --------------------------------------------------------  IN: 1158.8 mL / OUT: 1295 mL / NET: -136.2 mL            PHYSICAL EXAM:  GENERAL: NAD, speaks in full sentences, bipap changed to NC  HEAD:  Atraumatic, Normocephalic  CHEST/LUNG: Clear to auscultation bilaterally; No wheeze; No crackles; No accessory muscles used  HEART: Regular rate and rhythm; No murmurs;   ABDOMEN: Soft, Nontender, Nondistended; Bowel sounds present; No guarding  EXTREMITIES: Severe lymphedema bilaterally with hyperpigmentation  PSYCH: AAOx3    LABS:  CBC Full  -  ( 01 Sep 2021 05:22 )  WBC Count : 9.17 K/uL  RBC Count : 3.52 M/uL  Hemoglobin : 11.4 g/dL  Hematocrit : 37.4 %  Platelet Count - Automated : 195 K/uL  Mean Cell Volume : 106.3 fl  Mean Cell Hemoglobin : 32.4 pg  Mean Cell Hemoglobin Concentration : 30.5 gm/dL  Auto Neutrophil # : 6.68 K/uL  Auto Lymphocyte # : 0.94 K/uL  Auto Monocyte # : 1.39 K/uL  Auto Eosinophil # : 0.01 K/uL  Auto Basophil # : 0.02 K/uL  Auto Neutrophil % : 72.8 %  Auto Lymphocyte % : 10.3 %  Auto Monocyte % : 15.2 %  Auto Eosinophil % : 0.1 %  Auto Basophil % : 0.2 %        142  |  111<H>  |  49<H>  ----------------------------<  99  4.9   |  25  |  4.31<H>    Ca    8.7      01 Sep 2021 05:22  Phos  6.6       Mg     2.6         TPro  7.2  /  Alb  2.4<L>  /  TBili  0.2  /  DBili  x   /  AST  11  /  ALT  12  /  AlkPhos  85        Urinalysis Basic - ( 30 Aug 2021 18:47 )    Color: Yellow / Appearance: Clear / S.015 / pH: x  Gluc: x / Ketone: Negative  / Bili: Negative / Urobili: Negative   Blood: x / Protein: 100 / Nitrite: Negative   Leuk Esterase: Negative / RBC: 0-2 /HPF / WBC 3-5 /HPF   Sq Epi: x / Non Sq Epi: Occasional /HPF / Bacteria: Trace /HPF      Culture Results:   <10,000 CFU/mL Normal Urogenital Ivelisse ( @ 21:13)      RADIOLOGY & ADDITIONAL STUDIES REVIEWED:  ***    [ ]GOALS OF CARE DISCUSSION WITH PATIENT/FAMILY/PROXY:    CRITICAL CARE TIME SPENT: 35 minutes INTERVAL HPI/OVERNIGHT EVENTS:  Seen and examined at bedside. Patient was agitated because he wanted to go to the restroom but he could not move. Also thirsty. Complains of back pain due to his positioning.    PRESSORS: [X] YES [ ] NO  WHICH: Phenylephrine 40    Antimicrobial:    Cardiovascular:  phenylephrine    Infusion 0.1 MICROgram(s)/kG/Min IV Continuous <Continuous>  tamsulosin 0.4 milliGRAM(s) Oral at bedtime    Pulmonary:  ALBUTerol    90 MICROgram(s) HFA Inhaler 2 Puff(s) Inhalation <User Schedule>  budesonide 160 MICROgram(s)/formoterol 4.5 MICROgram(s) Inhaler 2 Puff(s) Inhalation two times a day    Hematalogic:  aspirin enteric coated 81 milliGRAM(s) Oral daily  heparin   Injectable 5000 Unit(s) SubCutaneous every 8 hours    Other:  acetaminophen   Tablet .. 650 milliGRAM(s) Oral every 6 hours PRN  buPROPion XL (24-Hour) . 150 milliGRAM(s) Oral daily  busPIRone 10 milliGRAM(s) Oral two times a day  cholecalciferol 1000 Unit(s) Oral daily  diVALproex  milliGRAM(s) Oral <User Schedule>  ferrous    sulfate 325 milliGRAM(s) Oral daily  finasteride 5 milliGRAM(s) Oral daily  folic acid 1 milliGRAM(s) Oral daily  levothyroxine Injectable 94 MICROGram(s) IV Push <User Schedule>  pantoprazole    Tablet 40 milliGRAM(s) Oral before breakfast  polyethylene glycol 3350 17 Gram(s) Oral daily  risperiDONE   Tablet 2 milliGRAM(s) Oral <User Schedule>  senna 2 Tablet(s) Oral at bedtime  sevelamer carbonate 800 milliGRAM(s) Oral three times a day with meals  simvastatin 20 milliGRAM(s) Oral at bedtime  sodium chloride 0.9%. 1000 milliLiter(s) IV Continuous <Continuous>    acetaminophen   Tablet .. 650 milliGRAM(s) Oral every 6 hours PRN  ALBUTerol    90 MICROgram(s) HFA Inhaler 2 Puff(s) Inhalation <User Schedule>  aspirin enteric coated 81 milliGRAM(s) Oral daily  budesonide 160 MICROgram(s)/formoterol 4.5 MICROgram(s) Inhaler 2 Puff(s) Inhalation two times a day  buPROPion XL (24-Hour) . 150 milliGRAM(s) Oral daily  busPIRone 10 milliGRAM(s) Oral two times a day  cholecalciferol 1000 Unit(s) Oral daily  diVALproex  milliGRAM(s) Oral <User Schedule>  ferrous    sulfate 325 milliGRAM(s) Oral daily  finasteride 5 milliGRAM(s) Oral daily  folic acid 1 milliGRAM(s) Oral daily  heparin   Injectable 5000 Unit(s) SubCutaneous every 8 hours  levothyroxine Injectable 94 MICROGram(s) IV Push <User Schedule>  pantoprazole    Tablet 40 milliGRAM(s) Oral before breakfast  phenylephrine    Infusion 0.1 MICROgram(s)/kG/Min IV Continuous <Continuous>  polyethylene glycol 3350 17 Gram(s) Oral daily  risperiDONE   Tablet 2 milliGRAM(s) Oral <User Schedule>  senna 2 Tablet(s) Oral at bedtime  sevelamer carbonate 800 milliGRAM(s) Oral three times a day with meals  simvastatin 20 milliGRAM(s) Oral at bedtime  sodium chloride 0.9%. 1000 milliLiter(s) IV Continuous <Continuous>  tamsulosin 0.4 milliGRAM(s) Oral at bedtime    Drug Dosing Weight  Height (cm): 172.7 (30 Aug 2021 08:52)  Weight (kg): 135.1 (31 Aug 2021 22:45)  BMI (kg/m2): 45.3 (31 Aug 2021 22:45)  BSA (m2): 2.42 (31 Aug 2021 22:45)    CENTRAL LINE: [ ] YES [ ] NO  LOCATION:   DATE INSERTED:  REMOVE: [ ] YES [ ] NO  EXPLAIN:    HATFIELD: [ ] YES [ ] NO    DATE INSERTED:  REMOVE:  [ ] YES [ ] NO  EXPLAIN:    A-LINE:  [ ] YES [ ] NO  LOCATION:   DATE INSERTED:  REMOVE:  [ ] YES [ ] NO  EXPLAIN:    PMH -reviewed admission note, no change since admission  PAST MEDICAL & SURGICAL HISTORY:  Hypothyroid    Hyperlipidemia    Ambulatory dysfunction    Anemia    History of BPH    CKD (chronic kidney disease)    Chronic obstructive pulmonary disease (COPD)    Bipolar disorder        ICU Vital Signs Last 24 Hrs  T(C): 36.1 (01 Sep 2021 04:00), Max: 37.7 (31 Aug 2021 15:18)  T(F): 97 (01 Sep 2021 04:00), Max: 99.8 (31 Aug 2021 15:18)  HR: 79 (01 Sep 2021 06:45) (79 - 99)  BP: 105/59 (01 Sep 2021 06:45) (85/41 - 127/50)  BP(mean): 70 (01 Sep 2021 06:45) (52 - 72)  ABP: --  ABP(mean): --  RR: 17 (01 Sep 2021 06:45) (14 - 27)  SpO2: 93% (01 Sep 2021 06:45) (89% - 100%)      ABG - ( 01 Sep 2021 04:16 )  pH, Arterial: 7.14  pH, Blood: x     /  pCO2: 75    /  pO2: 88    / HCO3: 26    / Base Excess: -5.1  /  SaO2: 98                    08- @ 07:01  -  - @ 07:00  --------------------------------------------------------  IN: 1158.8 mL / OUT: 1295 mL / NET: -136.2 mL            PHYSICAL EXAM:  GENERAL: NAD, speaks in full sentences, bipap, morbidly obese  HEAD:  Atraumatic, Normocephalic  CHEST/LUNG: Clear to auscultation bilaterally; No wheeze; No crackles; No accessory muscles used  HEART: Regular rate and rhythm; No murmurs;   ABDOMEN: Soft, Nontender, Nondistended; Bowel sounds present; No guarding  EXTREMITIES: Severe lymphedema bilaterally with hyperpigmentation  PSYCH: AAOx3    LABS:  CBC Full  -  ( 01 Sep 2021 05:22 )  WBC Count : 9.17 K/uL  RBC Count : 3.52 M/uL  Hemoglobin : 11.4 g/dL  Hematocrit : 37.4 %  Platelet Count - Automated : 195 K/uL  Mean Cell Volume : 106.3 fl  Mean Cell Hemoglobin : 32.4 pg  Mean Cell Hemoglobin Concentration : 30.5 gm/dL  Auto Neutrophil # : 6.68 K/uL  Auto Lymphocyte # : 0.94 K/uL  Auto Monocyte # : 1.39 K/uL  Auto Eosinophil # : 0.01 K/uL  Auto Basophil # : 0.02 K/uL  Auto Neutrophil % : 72.8 %  Auto Lymphocyte % : 10.3 %  Auto Monocyte % : 15.2 %  Auto Eosinophil % : 0.1 %  Auto Basophil % : 0.2 %        142  |  111<H>  |  49<H>  ----------------------------<  99  4.9   |  25  |  4.31<H>    Ca    8.7      01 Sep 2021 05:22  Phos  6.6       Mg     2.6         TPro  7.2  /  Alb  2.4<L>  /  TBili  0.2  /  DBili  x   /  AST  11  /  ALT  12  /  AlkPhos  85        Urinalysis Basic - ( 30 Aug 2021 18:47 )    Color: Yellow / Appearance: Clear / S.015 / pH: x  Gluc: x / Ketone: Negative  / Bili: Negative / Urobili: Negative   Blood: x / Protein: 100 / Nitrite: Negative   Leuk Esterase: Negative / RBC: 0-2 /HPF / WBC 3-5 /HPF   Sq Epi: x / Non Sq Epi: Occasional /HPF / Bacteria: Trace /HPF      Culture Results:   <10,000 CFU/mL Normal Urogenital Ivelisse ( @ 21:13)      RADIOLOGY & ADDITIONAL STUDIES REVIEWED:  ***    [ ]GOALS OF CARE DISCUSSION WITH PATIENT/FAMILY/PROXY:    CRITICAL CARE TIME SPENT: 35 minutes INTERVAL HPI/OVERNIGHT EVENTS:  Seen and examined at bedside. Awake & alert, but intermittently lethargic. Patient was agitated because he wanted to go to the restroom but he could not move. Also thirsty. Complains of back pain due to his positioning.      PRESSORS: [X] YES [ ] NO  WHICH: Phenylephrine 40    Antimicrobial:    Cardiovascular:  phenylephrine    Infusion 0.1 MICROgram(s)/kG/Min IV Continuous <Continuous>  tamsulosin 0.4 milliGRAM(s) Oral at bedtime    Pulmonary:  ALBUTerol    90 MICROgram(s) HFA Inhaler 2 Puff(s) Inhalation <User Schedule>  budesonide 160 MICROgram(s)/formoterol 4.5 MICROgram(s) Inhaler 2 Puff(s) Inhalation two times a day    Hematalogic:  aspirin enteric coated 81 milliGRAM(s) Oral daily  heparin   Injectable 5000 Unit(s) SubCutaneous every 8 hours    Other:  acetaminophen   Tablet .. 650 milliGRAM(s) Oral every 6 hours PRN  buPROPion XL (24-Hour) . 150 milliGRAM(s) Oral daily  busPIRone 10 milliGRAM(s) Oral two times a day  cholecalciferol 1000 Unit(s) Oral daily  diVALproex  milliGRAM(s) Oral <User Schedule>  ferrous    sulfate 325 milliGRAM(s) Oral daily  finasteride 5 milliGRAM(s) Oral daily  folic acid 1 milliGRAM(s) Oral daily  levothyroxine Injectable 94 MICROGram(s) IV Push <User Schedule>  pantoprazole    Tablet 40 milliGRAM(s) Oral before breakfast  polyethylene glycol 3350 17 Gram(s) Oral daily  risperiDONE   Tablet 2 milliGRAM(s) Oral <User Schedule>  senna 2 Tablet(s) Oral at bedtime  sevelamer carbonate 800 milliGRAM(s) Oral three times a day with meals  simvastatin 20 milliGRAM(s) Oral at bedtime  sodium chloride 0.9%. 1000 milliLiter(s) IV Continuous <Continuous>    acetaminophen   Tablet .. 650 milliGRAM(s) Oral every 6 hours PRN  ALBUTerol    90 MICROgram(s) HFA Inhaler 2 Puff(s) Inhalation <User Schedule>  aspirin enteric coated 81 milliGRAM(s) Oral daily  budesonide 160 MICROgram(s)/formoterol 4.5 MICROgram(s) Inhaler 2 Puff(s) Inhalation two times a day  buPROPion XL (24-Hour) . 150 milliGRAM(s) Oral daily  busPIRone 10 milliGRAM(s) Oral two times a day  cholecalciferol 1000 Unit(s) Oral daily  diVALproex  milliGRAM(s) Oral <User Schedule>  ferrous    sulfate 325 milliGRAM(s) Oral daily  finasteride 5 milliGRAM(s) Oral daily  folic acid 1 milliGRAM(s) Oral daily  heparin   Injectable 5000 Unit(s) SubCutaneous every 8 hours  levothyroxine Injectable 94 MICROGram(s) IV Push <User Schedule>  pantoprazole    Tablet 40 milliGRAM(s) Oral before breakfast  phenylephrine    Infusion 0.1 MICROgram(s)/kG/Min IV Continuous <Continuous>  polyethylene glycol 3350 17 Gram(s) Oral daily  risperiDONE   Tablet 2 milliGRAM(s) Oral <User Schedule>  senna 2 Tablet(s) Oral at bedtime  sevelamer carbonate 800 milliGRAM(s) Oral three times a day with meals  simvastatin 20 milliGRAM(s) Oral at bedtime  sodium chloride 0.9%. 1000 milliLiter(s) IV Continuous <Continuous>  tamsulosin 0.4 milliGRAM(s) Oral at bedtime    Drug Dosing Weight  Height (cm): 172.7 (30 Aug 2021 08:52)  Weight (kg): 135.1 (31 Aug 2021 22:45)  BMI (kg/m2): 45.3 (31 Aug 2021 22:45)  BSA (m2): 2.42 (31 Aug 2021 22:45)    CENTRAL LINE: [ ] YES [ ] NO  LOCATION:   DATE INSERTED:  REMOVE: [ ] YES [ ] NO  EXPLAIN:    HATFIELD: [ ] YES [ ] NO    DATE INSERTED:  REMOVE:  [ ] YES [ ] NO  EXPLAIN:    A-LINE:  [ ] YES [ ] NO  LOCATION:   DATE INSERTED:  REMOVE:  [ ] YES [ ] NO  EXPLAIN:    PMH -reviewed admission note, no change since admission  PAST MEDICAL & SURGICAL HISTORY:  Hypothyroid    Hyperlipidemia    Ambulatory dysfunction    Anemia    History of BPH    CKD (chronic kidney disease)    Chronic obstructive pulmonary disease (COPD)    Bipolar disorder        ICU Vital Signs Last 24 Hrs  T(C): 36.1 (01 Sep 2021 04:00), Max: 37.7 (31 Aug 2021 15:18)  T(F): 97 (01 Sep 2021 04:00), Max: 99.8 (31 Aug 2021 15:18)  HR: 79 (01 Sep 2021 06:45) (79 - 99)  BP: 105/59 (01 Sep 2021 06:45) (85/41 - 127/50)  BP(mean): 70 (01 Sep 2021 06:45) (52 - 72)  ABP: --  ABP(mean): --  RR: 17 (01 Sep 2021 06:45) (14 - 27)  SpO2: 93% (01 Sep 2021 06:45) (89% - 100%)      ABG - ( 01 Sep 2021 04:16 )  pH, Arterial: 7.14  pH, Blood: x     /  pCO2: 75    /  pO2: 88    / HCO3: 26    / Base Excess: -5.1  /  SaO2: 98                     @ 07:01  -   @ 07:00  --------------------------------------------------------  IN: 1158.8 mL / OUT: 1295 mL / NET: -136.2 mL            PHYSICAL EXAM:  GENERAL: NAD, speaks in full sentences, bipap, morbidly obese  HEAD:  Atraumatic, Normocephalic  CHEST/LUNG: Clear to auscultation bilaterally; No wheeze; No crackles; No accessory muscles used  HEART: Regular rate and rhythm; No murmurs;   ABDOMEN: Soft, Nontender, Nondistended; Bowel sounds present; No guarding  EXTREMITIES: Severe lymphedema bilaterally with hyperpigmentation  PSYCH: AAOx3    LABS:  CBC Full  -  ( 01 Sep 2021 05:22 )  WBC Count : 9.17 K/uL  RBC Count : 3.52 M/uL  Hemoglobin : 11.4 g/dL  Hematocrit : 37.4 %  Platelet Count - Automated : 195 K/uL  Mean Cell Volume : 106.3 fl  Mean Cell Hemoglobin : 32.4 pg  Mean Cell Hemoglobin Concentration : 30.5 gm/dL  Auto Neutrophil # : 6.68 K/uL  Auto Lymphocyte # : 0.94 K/uL  Auto Monocyte # : 1.39 K/uL  Auto Eosinophil # : 0.01 K/uL  Auto Basophil # : 0.02 K/uL  Auto Neutrophil % : 72.8 %  Auto Lymphocyte % : 10.3 %  Auto Monocyte % : 15.2 %  Auto Eosinophil % : 0.1 %  Auto Basophil % : 0.2 %        142  |  111<H>  |  49<H>  ----------------------------<  99  4.9   |  25  |  4.31<H>    Ca    8.7      01 Sep 2021 05:22  Phos  6.6       Mg     2.6         TPro  7.2  /  Alb  2.4<L>  /  TBili  0.2  /  DBili  x   /  AST  11  /  ALT  12  /  AlkPhos  85        Urinalysis Basic - ( 30 Aug 2021 18:47 )    Color: Yellow / Appearance: Clear / S.015 / pH: x  Gluc: x / Ketone: Negative  / Bili: Negative / Urobili: Negative   Blood: x / Protein: 100 / Nitrite: Negative   Leuk Esterase: Negative / RBC: 0-2 /HPF / WBC 3-5 /HPF   Sq Epi: x / Non Sq Epi: Occasional /HPF / Bacteria: Trace /HPF      Culture Results:   <10,000 CFU/mL Normal Urogenital Ivelisse ( @ 21:13)      RADIOLOGY & ADDITIONAL STUDIES REVIEWED:  ***    [ ]GOALS OF CARE DISCUSSION WITH PATIENT/FAMILY/PROXY:    CRITICAL CARE TIME SPENT: 35 minutes INTERVAL HPI/OVERNIGHT EVENTS:  Seen and examined at bedside. Awake & alert, but intermittently lethargic. Patient was agitated because he wanted to go to the restroom but he could not move. Also thirsty. Complains of back pain due to his positioning.      PRESSORS: [] YES [X] NO  WHICH:    Antimicrobial:    Cardiovascular:  phenylephrine    Infusion 0.1 MICROgram(s)/kG/Min IV Continuous <Continuous>  tamsulosin 0.4 milliGRAM(s) Oral at bedtime    Pulmonary:  ALBUTerol    90 MICROgram(s) HFA Inhaler 2 Puff(s) Inhalation <User Schedule>  budesonide 160 MICROgram(s)/formoterol 4.5 MICROgram(s) Inhaler 2 Puff(s) Inhalation two times a day    Hematalogic:  aspirin enteric coated 81 milliGRAM(s) Oral daily  heparin   Injectable 5000 Unit(s) SubCutaneous every 8 hours    Other:  acetaminophen   Tablet .. 650 milliGRAM(s) Oral every 6 hours PRN  buPROPion XL (24-Hour) . 150 milliGRAM(s) Oral daily  busPIRone 10 milliGRAM(s) Oral two times a day  cholecalciferol 1000 Unit(s) Oral daily  diVALproex  milliGRAM(s) Oral <User Schedule>  ferrous    sulfate 325 milliGRAM(s) Oral daily  finasteride 5 milliGRAM(s) Oral daily  folic acid 1 milliGRAM(s) Oral daily  levothyroxine Injectable 94 MICROGram(s) IV Push <User Schedule>  pantoprazole    Tablet 40 milliGRAM(s) Oral before breakfast  polyethylene glycol 3350 17 Gram(s) Oral daily  risperiDONE   Tablet 2 milliGRAM(s) Oral <User Schedule>  senna 2 Tablet(s) Oral at bedtime  sevelamer carbonate 800 milliGRAM(s) Oral three times a day with meals  simvastatin 20 milliGRAM(s) Oral at bedtime  sodium chloride 0.9%. 1000 milliLiter(s) IV Continuous <Continuous>    acetaminophen   Tablet .. 650 milliGRAM(s) Oral every 6 hours PRN  ALBUTerol    90 MICROgram(s) HFA Inhaler 2 Puff(s) Inhalation <User Schedule>  aspirin enteric coated 81 milliGRAM(s) Oral daily  budesonide 160 MICROgram(s)/formoterol 4.5 MICROgram(s) Inhaler 2 Puff(s) Inhalation two times a day  buPROPion XL (24-Hour) . 150 milliGRAM(s) Oral daily  busPIRone 10 milliGRAM(s) Oral two times a day  cholecalciferol 1000 Unit(s) Oral daily  diVALproex  milliGRAM(s) Oral <User Schedule>  ferrous    sulfate 325 milliGRAM(s) Oral daily  finasteride 5 milliGRAM(s) Oral daily  folic acid 1 milliGRAM(s) Oral daily  heparin   Injectable 5000 Unit(s) SubCutaneous every 8 hours  levothyroxine Injectable 94 MICROGram(s) IV Push <User Schedule>  pantoprazole    Tablet 40 milliGRAM(s) Oral before breakfast  phenylephrine    Infusion 0.1 MICROgram(s)/kG/Min IV Continuous <Continuous>  polyethylene glycol 3350 17 Gram(s) Oral daily  risperiDONE   Tablet 2 milliGRAM(s) Oral <User Schedule>  senna 2 Tablet(s) Oral at bedtime  sevelamer carbonate 800 milliGRAM(s) Oral three times a day with meals  simvastatin 20 milliGRAM(s) Oral at bedtime  sodium chloride 0.9%. 1000 milliLiter(s) IV Continuous <Continuous>  tamsulosin 0.4 milliGRAM(s) Oral at bedtime    Drug Dosing Weight  Height (cm): 172.7 (30 Aug 2021 08:52)  Weight (kg): 135.1 (31 Aug 2021 22:45)  BMI (kg/m2): 45.3 (31 Aug 2021 22:45)  BSA (m2): 2.42 (31 Aug 2021 22:45)    CENTRAL LINE: [ ] YES [ ] NO  LOCATION:   DATE INSERTED:  REMOVE: [ ] YES [ ] NO  EXPLAIN:    HATFIELD: [ ] YES [ ] NO    DATE INSERTED:  REMOVE:  [ ] YES [ ] NO  EXPLAIN:    A-LINE:  [ ] YES [ ] NO  LOCATION:   DATE INSERTED:  REMOVE:  [ ] YES [ ] NO  EXPLAIN:    PMH -reviewed admission note, no change since admission  PAST MEDICAL & SURGICAL HISTORY:  Hypothyroid    Hyperlipidemia    Ambulatory dysfunction    Anemia    History of BPH    CKD (chronic kidney disease)    Chronic obstructive pulmonary disease (COPD)    Bipolar disorder        ICU Vital Signs Last 24 Hrs  T(C): 36.1 (01 Sep 2021 04:00), Max: 37.7 (31 Aug 2021 15:18)  T(F): 97 (01 Sep 2021 04:00), Max: 99.8 (31 Aug 2021 15:18)  HR: 79 (01 Sep 2021 06:45) (79 - 99)  BP: 105/59 (01 Sep 2021 06:45) (85/41 - 127/50)  BP(mean): 70 (01 Sep 2021 06:45) (52 - 72)  ABP: --  ABP(mean): --  RR: 17 (01 Sep 2021 06:45) (14 - 27)  SpO2: 93% (01 Sep 2021 06:45) (89% - 100%)      ABG - ( 01 Sep 2021 04:16 )  pH, Arterial: 7.14  pH, Blood: x     /  pCO2: 75    /  pO2: 88    / HCO3: 26    / Base Excess: -5.1  /  SaO2: 98                     @ 07:01  -   @ 07:00  --------------------------------------------------------  IN: 1158.8 mL / OUT: 1295 mL / NET: -136.2 mL            PHYSICAL EXAM:  GENERAL: NAD, speaks in full sentences, bipap, morbidly obese  HEAD:  Atraumatic, Normocephalic  CHEST/LUNG: Clear to auscultation bilaterally; No wheeze; No crackles; No accessory muscles used  HEART: Regular rate and rhythm; No murmurs;   ABDOMEN: Soft, Nontender, Nondistended; Bowel sounds present; No guarding  EXTREMITIES: Severe lymphedema bilaterally with hyperpigmentation  PSYCH: AAOx3    LABS:  CBC Full  -  ( 01 Sep 2021 05:22 )  WBC Count : 9.17 K/uL  RBC Count : 3.52 M/uL  Hemoglobin : 11.4 g/dL  Hematocrit : 37.4 %  Platelet Count - Automated : 195 K/uL  Mean Cell Volume : 106.3 fl  Mean Cell Hemoglobin : 32.4 pg  Mean Cell Hemoglobin Concentration : 30.5 gm/dL  Auto Neutrophil # : 6.68 K/uL  Auto Lymphocyte # : 0.94 K/uL  Auto Monocyte # : 1.39 K/uL  Auto Eosinophil # : 0.01 K/uL  Auto Basophil # : 0.02 K/uL  Auto Neutrophil % : 72.8 %  Auto Lymphocyte % : 10.3 %  Auto Monocyte % : 15.2 %  Auto Eosinophil % : 0.1 %  Auto Basophil % : 0.2 %        142  |  111<H>  |  49<H>  ----------------------------<  99  4.9   |  25  |  4.31<H>    Ca    8.7      01 Sep 2021 05:22  Phos  6.6       Mg     2.6         TPro  7.2  /  Alb  2.4<L>  /  TBili  0.2  /  DBili  x   /  AST  11  /  ALT  12  /  AlkPhos  85        Urinalysis Basic - ( 30 Aug 2021 18:47 )    Color: Yellow / Appearance: Clear / S.015 / pH: x  Gluc: x / Ketone: Negative  / Bili: Negative / Urobili: Negative   Blood: x / Protein: 100 / Nitrite: Negative   Leuk Esterase: Negative / RBC: 0-2 /HPF / WBC 3-5 /HPF   Sq Epi: x / Non Sq Epi: Occasional /HPF / Bacteria: Trace /HPF      Culture Results:   <10,000 CFU/mL Normal Urogenital Ivelisse ( @ 21:13)      RADIOLOGY & ADDITIONAL STUDIES REVIEWED:  ***    [ ]GOALS OF CARE DISCUSSION WITH PATIENT/FAMILY/PROXY:    CRITICAL CARE TIME SPENT: 35 minutes

## 2021-09-01 NOTE — PROGRESS NOTE ADULT - SUBJECTIVE AND OBJECTIVE BOX
Patient is a 61y old  Male who presents with a chief complaint of SHEYLA ON CKD, DEMAND ISCHEMIA, AMBULATORY DYSFUNCTION (01 Sep 2021 17:39)    PATIENT IS SEEN AND EXAMINED IN MEDICAL FLOOR.  SOWMYA [    ]    ALYSIA [   ]      GT [   ]    ALLERGIES:  No Known Allergies      Daily     Daily Weight in k.4 (01 Sep 2021 08:00)    VITALS:    Vital Signs Last 24 Hrs  T(C): 37.1 (01 Sep 2021 19:00), Max: 37.1 (01 Sep 2021 19:00)  T(F): 98.7 (01 Sep 2021 19:00), Max: 98.7 (01 Sep 2021 19:00)  HR: 88 (01 Sep 2021 22:00) (71 - 98)  BP: 121/61 (01 Sep 2021 22:00) (73/38 - 132/65)  BP(mean): 74 (01 Sep 2021 22:00) (46 - 103)  RR: 15 (01 Sep 2021 22:00) (12 - 29)  SpO2: 100% (01 Sep 2021 22:00) (89% - 100%)    LABS:    CBC Full  -  ( 01 Sep 2021 05:22 )  WBC Count : 9.17 K/uL  RBC Count : 3.52 M/uL  Hemoglobin : 11.4 g/dL  Hematocrit : 37.4 %  Platelet Count - Automated : 195 K/uL  Mean Cell Volume : 106.3 fl  Mean Cell Hemoglobin : 32.4 pg  Mean Cell Hemoglobin Concentration : 30.5 gm/dL  Auto Neutrophil # : 6.68 K/uL  Auto Lymphocyte # : 0.94 K/uL  Auto Monocyte # : 1.39 K/uL  Auto Eosinophil # : 0.01 K/uL  Auto Basophil # : 0.02 K/uL  Auto Neutrophil % : 72.8 %  Auto Lymphocyte % : 10.3 %  Auto Monocyte % : 15.2 %  Auto Eosinophil % : 0.1 %  Auto Basophil % : 0.2 %          142  |  111<H>  |  49<H>  ----------------------------<  99  4.9   |  25  |  4.31<H>    Ca    8.7      01 Sep 2021 05:22  Phos  6.6     09-  Mg     2.6     -    TPro  7.2  /  Alb  2.4<L>  /  TBili  0.2  /  DBili  x   /  AST  11  /  ALT  12  /  AlkPhos  85      CAPILLARY BLOOD GLUCOSE      POCT Blood Glucose.: 120 mg/dL (01 Sep 2021 21:36)  POCT Blood Glucose.: 93 mg/dL (01 Sep 2021 17:19)  POCT Blood Glucose.: 101 mg/dL (01 Sep 2021 11:30)  POCT Blood Glucose.: 138 mg/dL (31 Aug 2021 23:08)        LIVER FUNCTIONS - ( 01 Sep 2021 05:22 )  Alb: 2.4 g/dL / Pro: 7.2 g/dL / ALK PHOS: 85 U/L / ALT: 12 U/L DA / AST: 11 U/L / GGT: x           Creatinine Trend: 4.31<--, 4.01<--, 3.82<--, 3.86<--  I&O's Summary    31 Aug 2021 07:  -  01 Sep 2021 07:00  --------------------------------------------------------  IN: 1244 mL / OUT: 1395 mL / NET: -151 mL    01 Sep 2021 07:01  -  01 Sep 2021 22:29  --------------------------------------------------------  IN: 240.4 mL / OUT: 2550 mL / NET: -2309.6 mL        ABG - ( 01 Sep 2021 18:19 )  pH, Arterial: 7.22  pH, Blood: x     /  pCO2: 62    /  pO2: 83    / HCO3: 25    / Base Excess: -3.5  /  SaO2: 97                  Clean Catch Clean Catch (Midstream)  08- @ 21:13   <10,000 CFU/mL Normal Urogenital Ivelisse  --  --          MEDICATIONS:    MEDICATIONS  (STANDING):  ALBUTerol    90 MICROgram(s) HFA Inhaler 2 Puff(s) Inhalation <User Schedule>  aspirin enteric coated 81 milliGRAM(s) Oral daily  budesonide 160 MICROgram(s)/formoterol 4.5 MICROgram(s) Inhaler 2 Puff(s) Inhalation two times a day  buPROPion XL (24-Hour) . 150 milliGRAM(s) Oral daily  busPIRone 10 milliGRAM(s) Oral two times a day  calcitriol   Capsule 0.25 MICROGram(s) Oral daily  cholecalciferol 1000 Unit(s) Oral daily  diVALproex  milliGRAM(s) Oral <User Schedule>  ferrous    sulfate 325 milliGRAM(s) Oral daily  finasteride 5 milliGRAM(s) Oral daily  folic acid 1 milliGRAM(s) Oral daily  furosemide   Injectable 40 milliGRAM(s) IV Push daily  heparin   Injectable 5000 Unit(s) SubCutaneous every 8 hours  levothyroxine Injectable 94 MICROGram(s) IV Push <User Schedule>  lidocaine   4% Patch 1 Patch Transdermal daily  nicotine -  14 mG/24Hr(s) Patch 1 patch Transdermal daily  pantoprazole    Tablet 40 milliGRAM(s) Oral before breakfast  polyethylene glycol 3350 17 Gram(s) Oral daily  risperiDONE   Tablet 2 milliGRAM(s) Oral <User Schedule>  senna 2 Tablet(s) Oral at bedtime  sevelamer carbonate 800 milliGRAM(s) Oral three times a day with meals  simvastatin 20 milliGRAM(s) Oral at bedtime  tamsulosin 0.4 milliGRAM(s) Oral at bedtime      MEDICATIONS  (PRN):  acetaminophen   Tablet .. 1000 milliGRAM(s) Oral every 8 hours PRN Moderate Pain (4 - 6)      REVIEW OF SYSTEMS:                           ALL ROS DONE [ X   ]    CONSTITUTIONAL:  LETHARGIC [   ], FEVER [   ], UNRESPONSIVE [   ]  CVS:  CP  [   ], SOB, [   ], PALPITATIONS [   ], DIZZYNESS [   ]  RS: COUGH [   ], SPUTUM [   ]  GI: ABDOMINAL PAIN [   ], NAUSEA [   ], VOMITINGS [   ], DIARRHEA [   ], CONSTIPATION [   ]  :  DYSURIA [   ], NOCTURIA [   ], INCREASED FREQUENCY [   ], DRIBLING [   ],  SKELETAL: PAINFUL JOINTS [   ], SWOLLEN JOINTS [   ], NECK ACHE [   ], LOW BACK ACHE [   ],  SKIN : ULCERS [   ], RASH [   ], ITCHING [   ]  CNS: HEAD ACHE [   ], DOUBLE VISION [   ], BLURRED VISION [   ], AMS / CONFUSION [   ], SEIZURES [   ], WEAKNESS [   ],TINGLING / NUMBNESS [   ]    PHYSICAL EXAMINATION:  GENERAL APPEARANCE: NO DISTRESS  HEENT:  NO PALLOR, NO  JVD,  NO   NODES, NECK SUPPLE  CVS: S1 +, S2 +,   RS: AEEB,  OCCASIONAL  RALES +,   NO RONCHI  ABD: SOFT, NT, NO, BS +  EXT: PE ++  SKIN: WARM,   SKELETAL:  ROM ACCEPTABLE  CNS:  AAO X 2     RADIOLOGY :    RADIOLOGY REVIEWED    ASSESSMENT :     Weakness    No pertinent past medical history    Hypothyroid    Hyperlipidemia    Schizophrenia    Schizoaffective disorder    Ambulatory dysfunction    Anemia    History of BPH    CKD (chronic kidney disease)    Chronic obstructive pulmonary disease (COPD)    Bipolar disorder    Bipolar disorder    No significant past surgical history        PLAN:  HPI:  61 year old man with morbid obesity from [facility] [place] ambulates w/ walker has past medical hx of COPD not on O2 at home, hypothyroidism, anemia, left DVT, CKD, BPH, HLD, bipolar disorder, ambulatory dysfunction, lymphedema was BIBEMS as patient was complaining of worsening lower extremity weakness, lightheaded and overall not feeling well for one day. Of note, patient with slurred speech at baseline since childhood and known ambulatory dysfunction reason why he is on [facility]. Patient denies chest pain, palpitations, sob or other symptoms.      [facility] full code    (30 Aug 2021 17:02)    # RRT FOR AMS [] FOUND TO HAVE ACUTE HYPERCAPNIA - GIVEN NARCAN [PATIENT'S RESPONSIVENESS IMPROVED], NARCOTICS DISCONTINUED, TRANSFERRED TO ICU ON BIPAP  - PAIN MGMT CONSULT  # HYPOGLYCEMIA S/T POOR PO INTAKE - MONITOR BG  # ELEVATED TROPONIN - TREND, ECHOCARDIOGRAM - W/ MILD PULM HTN, CARDIOLOGY CONSULT  # SHEYLA ON CKD - HATFIELD PLACED, MONITOR CR, AVOID NEPHROTOXIC AGENTS - ? ATN  - MONITOR IS AND OS  - HOLD DIURETICS  - NEPHROLOGY CONSULT  # COPD  # HYPOTHYROIDISM  # MORBID OBESITY  # CHRONIC VENOUS INSUFFICIENCY, HX OF LEFT DVT  # F/U PT EVAL  # GI AND DVT PPX

## 2021-09-01 NOTE — PROGRESS NOTE ADULT - ATTENDING COMMENTS
Assessment:  1. Acute Encephalopathy  2. Acute Hypercapnic Respiratory failure   3. Opioid Overdose  4. Bipolar Disorder  5. COPD  6. CKD stage 4  7. Hypothyroidism  8. Anemia  9. Class 3 Obesity - likely underlying TEJ and OHS    Plan  - Cont. NIV support as tolerated   - Obtain repeat ABG  - Avoid opioids for now, monitor neurologic status  - Pain service consult for pain management  - IV lasix to aim for euvolemic volume status   - Cont. Bronchodilators, add albuterol/Symbicort  - Nicotine patch as current smoker  - May have underlying COPD, should get out patient PFTs  - D/c IV fluids  - Aspiration precautions  - Cont. Psych medications  - NPO for now  - DVT and stress ulcer prophylaxis  - Cont. ICU management for now

## 2021-09-02 ENCOUNTER — TRANSCRIPTION ENCOUNTER (OUTPATIENT)
Age: 61
End: 2021-09-02

## 2021-09-02 DIAGNOSIS — R53.1 WEAKNESS: ICD-10-CM

## 2021-09-02 DIAGNOSIS — T50.901A POISONING BY UNSPECIFIED DRUGS, MEDICAMENTS AND BIOLOGICAL SUBSTANCES, ACCIDENTAL (UNINTENTIONAL), INITIAL ENCOUNTER: ICD-10-CM

## 2021-09-02 DIAGNOSIS — J96.02 ACUTE RESPIRATORY FAILURE WITH HYPERCAPNIA: ICD-10-CM

## 2021-09-02 DIAGNOSIS — Z02.9 ENCOUNTER FOR ADMINISTRATIVE EXAMINATIONS, UNSPECIFIED: ICD-10-CM

## 2021-09-02 LAB
ANION GAP SERPL CALC-SCNC: 3 MMOL/L — LOW (ref 5–17)
BASOPHILS # BLD AUTO: 0.02 K/UL — SIGNIFICANT CHANGE UP (ref 0–0.2)
BASOPHILS NFR BLD AUTO: 0.3 % — SIGNIFICANT CHANGE UP (ref 0–2)
BUN SERPL-MCNC: 55 MG/DL — HIGH (ref 7–18)
CALCIUM SERPL-MCNC: 8.5 MG/DL — SIGNIFICANT CHANGE UP (ref 8.4–10.5)
CHLORIDE SERPL-SCNC: 110 MMOL/L — HIGH (ref 96–108)
CO2 SERPL-SCNC: 29 MMOL/L — SIGNIFICANT CHANGE UP (ref 22–31)
CREAT SERPL-MCNC: 3.62 MG/DL — HIGH (ref 0.5–1.3)
EOSINOPHIL # BLD AUTO: 0.08 K/UL — SIGNIFICANT CHANGE UP (ref 0–0.5)
EOSINOPHIL NFR BLD AUTO: 1.2 % — SIGNIFICANT CHANGE UP (ref 0–6)
GLUCOSE SERPL-MCNC: 107 MG/DL — HIGH (ref 70–99)
HCT VFR BLD CALC: 32.4 % — LOW (ref 39–50)
HGB BLD-MCNC: 10.1 G/DL — LOW (ref 13–17)
IMM GRANULOCYTES NFR BLD AUTO: 0.9 % — SIGNIFICANT CHANGE UP (ref 0–1.5)
LYMPHOCYTES # BLD AUTO: 0.86 K/UL — LOW (ref 1–3.3)
LYMPHOCYTES # BLD AUTO: 13 % — SIGNIFICANT CHANGE UP (ref 13–44)
MAGNESIUM SERPL-MCNC: 2.5 MG/DL — SIGNIFICANT CHANGE UP (ref 1.6–2.6)
MCHC RBC-ENTMCNC: 31.2 GM/DL — LOW (ref 32–36)
MCHC RBC-ENTMCNC: 31.7 PG — SIGNIFICANT CHANGE UP (ref 27–34)
MCV RBC AUTO: 101.6 FL — HIGH (ref 80–100)
MONOCYTES # BLD AUTO: 0.87 K/UL — SIGNIFICANT CHANGE UP (ref 0–0.9)
MONOCYTES NFR BLD AUTO: 13.1 % — SIGNIFICANT CHANGE UP (ref 2–14)
NEUTROPHILS # BLD AUTO: 4.73 K/UL — SIGNIFICANT CHANGE UP (ref 1.8–7.4)
NEUTROPHILS NFR BLD AUTO: 71.5 % — SIGNIFICANT CHANGE UP (ref 43–77)
NRBC # BLD: 0 /100 WBCS — SIGNIFICANT CHANGE UP (ref 0–0)
PHOSPHATE SERPL-MCNC: 4.2 MG/DL — SIGNIFICANT CHANGE UP (ref 2.5–4.5)
PLATELET # BLD AUTO: 206 K/UL — SIGNIFICANT CHANGE UP (ref 150–400)
POTASSIUM SERPL-MCNC: 5 MMOL/L — SIGNIFICANT CHANGE UP (ref 3.5–5.3)
POTASSIUM SERPL-SCNC: 5 MMOL/L — SIGNIFICANT CHANGE UP (ref 3.5–5.3)
RBC # BLD: 3.19 M/UL — LOW (ref 4.2–5.8)
RBC # FLD: 13.6 % — SIGNIFICANT CHANGE UP (ref 10.3–14.5)
SODIUM SERPL-SCNC: 142 MMOL/L — SIGNIFICANT CHANGE UP (ref 135–145)
WBC # BLD: 6.62 K/UL — SIGNIFICANT CHANGE UP (ref 3.8–10.5)
WBC # FLD AUTO: 6.62 K/UL — SIGNIFICANT CHANGE UP (ref 3.8–10.5)

## 2021-09-02 PROCEDURE — 99231 SBSQ HOSP IP/OBS SF/LOW 25: CPT

## 2021-09-02 PROCEDURE — 76775 US EXAM ABDO BACK WALL LIM: CPT | Mod: 26

## 2021-09-02 RX ORDER — LEVOTHYROXINE SODIUM 125 MCG
125 TABLET ORAL DAILY
Refills: 0 | Status: DISCONTINUED | OUTPATIENT
Start: 2021-09-02 | End: 2021-09-08

## 2021-09-02 RX ADMIN — Medication 10 MILLIGRAM(S): at 18:33

## 2021-09-02 RX ADMIN — Medication 125 MICROGRAM(S): at 11:44

## 2021-09-02 RX ADMIN — Medication 10 MILLIGRAM(S): at 06:16

## 2021-09-02 RX ADMIN — TAMSULOSIN HYDROCHLORIDE 0.4 MILLIGRAM(S): 0.4 CAPSULE ORAL at 21:27

## 2021-09-02 RX ADMIN — DIVALPROEX SODIUM 500 MILLIGRAM(S): 500 TABLET, DELAYED RELEASE ORAL at 09:25

## 2021-09-02 RX ADMIN — LIDOCAINE 1 PATCH: 4 CREAM TOPICAL at 23:00

## 2021-09-02 RX ADMIN — Medication 40 MILLIGRAM(S): at 06:16

## 2021-09-02 RX ADMIN — HEPARIN SODIUM 5000 UNIT(S): 5000 INJECTION INTRAVENOUS; SUBCUTANEOUS at 13:36

## 2021-09-02 RX ADMIN — Medication 325 MILLIGRAM(S): at 12:42

## 2021-09-02 RX ADMIN — LIDOCAINE 1 PATCH: 4 CREAM TOPICAL at 20:25

## 2021-09-02 RX ADMIN — LIDOCAINE 1 PATCH: 4 CREAM TOPICAL at 11:44

## 2021-09-02 RX ADMIN — Medication 1 ENEMA: at 18:56

## 2021-09-02 RX ADMIN — DIVALPROEX SODIUM 500 MILLIGRAM(S): 500 TABLET, DELAYED RELEASE ORAL at 17:33

## 2021-09-02 RX ADMIN — SENNA PLUS 2 TABLET(S): 8.6 TABLET ORAL at 21:27

## 2021-09-02 RX ADMIN — BUPROPION HYDROCHLORIDE 150 MILLIGRAM(S): 150 TABLET, EXTENDED RELEASE ORAL at 13:35

## 2021-09-02 RX ADMIN — BUDESONIDE AND FORMOTEROL FUMARATE DIHYDRATE 2 PUFF(S): 160; 4.5 AEROSOL RESPIRATORY (INHALATION) at 21:27

## 2021-09-02 RX ADMIN — SEVELAMER CARBONATE 800 MILLIGRAM(S): 2400 POWDER, FOR SUSPENSION ORAL at 12:42

## 2021-09-02 RX ADMIN — Medication 81 MILLIGRAM(S): at 11:43

## 2021-09-02 RX ADMIN — ALBUTEROL 2 PUFF(S): 90 AEROSOL, METERED ORAL at 11:45

## 2021-09-02 RX ADMIN — ALBUTEROL 2 PUFF(S): 90 AEROSOL, METERED ORAL at 17:35

## 2021-09-02 RX ADMIN — BUDESONIDE AND FORMOTEROL FUMARATE DIHYDRATE 2 PUFF(S): 160; 4.5 AEROSOL RESPIRATORY (INHALATION) at 12:42

## 2021-09-02 RX ADMIN — FINASTERIDE 5 MILLIGRAM(S): 5 TABLET, FILM COATED ORAL at 11:44

## 2021-09-02 RX ADMIN — CALCITRIOL 0.25 MICROGRAM(S): 0.5 CAPSULE ORAL at 11:44

## 2021-09-02 RX ADMIN — Medication 1000 UNIT(S): at 11:44

## 2021-09-02 RX ADMIN — SIMVASTATIN 20 MILLIGRAM(S): 20 TABLET, FILM COATED ORAL at 21:27

## 2021-09-02 RX ADMIN — POLYETHYLENE GLYCOL 3350 17 GRAM(S): 17 POWDER, FOR SOLUTION ORAL at 12:42

## 2021-09-02 RX ADMIN — Medication 1 MILLIGRAM(S): at 11:43

## 2021-09-02 RX ADMIN — SEVELAMER CARBONATE 800 MILLIGRAM(S): 2400 POWDER, FOR SUSPENSION ORAL at 17:35

## 2021-09-02 RX ADMIN — SEVELAMER CARBONATE 800 MILLIGRAM(S): 2400 POWDER, FOR SUSPENSION ORAL at 09:25

## 2021-09-02 RX ADMIN — RISPERIDONE 2 MILLIGRAM(S): 4 TABLET ORAL at 21:27

## 2021-09-02 RX ADMIN — ALBUTEROL 2 PUFF(S): 90 AEROSOL, METERED ORAL at 09:25

## 2021-09-02 RX ADMIN — PANTOPRAZOLE SODIUM 40 MILLIGRAM(S): 20 TABLET, DELAYED RELEASE ORAL at 06:16

## 2021-09-02 RX ADMIN — HEPARIN SODIUM 5000 UNIT(S): 5000 INJECTION INTRAVENOUS; SUBCUTANEOUS at 21:26

## 2021-09-02 RX ADMIN — HEPARIN SODIUM 5000 UNIT(S): 5000 INJECTION INTRAVENOUS; SUBCUTANEOUS at 06:16

## 2021-09-02 NOTE — DISCHARGE NOTE PROVIDER - HOSPITAL COURSE
61 year old man with morbid obesity from [facility] [place] ambulates w/ walker has past medical hx of COPD not on O2 at home, hypothyroidism, anemia, left DVT, CKD, BPH, HLD, bipolar disorder, ambulatory dysfunction, lymphedema was BIBEMS as patient was complaining of worsening lower extremity weakness, lightheaded and overall not feeling well for one day, SHEYLA on CKD on labs. Off note, patient with slurred speech at baseline since childhood and known ambulatory dysfunction, that is the reason why he is at [facility].     On 8/31, pt was noted to be increasingly lethargic and drowsy and was hypotensive as well. Rapid response team was called. Patient was only opening eyes to painful stimuli. Pinpoint pupils were noted. Chart review showed he received Dilaudid 4mg PO in the afternoon, Narcan 0.4mg was given and pt opened eyes.   ABG shows PCO2 of 100 for which pt was placed on BiPAP in ICU and improved. IV Lasix 40 mg added for euvolemic status. Pt was able to drink water and take pills 9/1, hence diet advanced to soft. Latest ABG 7.22/62/83/25 on 4 liters NC, hence patient is being downgraded to AI service.       >>>>>>>>>>>>>>>>>>>>>>>>>>>>>>>>>>>>>>>>>>>>INCOMPLETE>>>>>>>>>>>>>>>>>>>>>>>>>>>>>>>>>>>>>>>>>> 61 year old man with morbid obesity from [facility] [place] ambulates w/ walker has past medical hx of COPD not on O2 at home, hypothyroidism, anemia, left DVT, CKD, BPH, HLD, bipolar disorder, ambulatory dysfunction, lymphedema was BIBEMS as patient was complaining of worsening lower extremity weakness, lightheaded and overall not feeling well for one day, SHEYLA on CKD on labs. Off note, patient with slurred speech at baseline since childhood and known ambulatory dysfunction, that is the reason why he is at [facility].     On 8/31, pt was noted to be increasingly lethargic and drowsy and was hypotensive as well. Rapid response team was called. Patient was only opening eyes to painful stimuli. Pinpoint pupils were noted. Chart review showed he received Dilaudid 4mg PO in the afternoon, Narcan 0.4mg was given and pt opened eyes.   ABG shows PCO2 of 100 for which pt was placed on BiPAP in ICU and improved. IV Lasix 40 mg added for euvolemic status. Pt was able to drink water and take pills 9/1, hence diet advanced to soft. Latest ABG 7.22/62/83/25 on 4 liters NC, hence patient is being downgraded to AI service.     9/2 - Failed TOV, thuy reinserted  9/3 - Awtg PT recs for d/c  9/4 - PT rec TG, pending choices      >>>>>>>>>>>>>>>>>>>>>>>>>>>>>>>>>>>>>>>>>>>>INCOMPLETE>>>>>>>>>>>>>>>>>>>>>>>>>>>>>>>>>>>>>>>>>> 61 year old man with morbid obesity from [facility] [place] ambulates w/ walker has past medical hx of COPD not on O2 at home, hypothyroidism, anemia, left DVT, CKD, BPH, HLD, bipolar disorder, ambulatory dysfunction, lymphedema was BIBEMS as patient was complaining of worsening lower extremity weakness, lightheaded and overall not feeling well for one day, SHEYLA on CKD on labs. Off note, patient with slurred speech at baseline since childhood and known ambulatory dysfunction, that is the reason why he is at [facility].     On 8/31, pt was noted to be increasingly lethargic and drowsy and was hypotensive as well. Rapid response team was called. Patient was only opening eyes to painful stimuli. Pinpoint pupils were noted. Chart review showed he received Dilaudid 4mg PO in the afternoon, Narcan 0.4mg was given and pt opened eyes.   ABG shows PCO2 of 100 for which pt was placed on BiPAP in ICU and improved. IV Lasix 40 mg added for euvolemic status. Pt was able to drink water and take pills 9/1, hence diet advanced to soft. Latest ABG 7.22/62/83/25 on 4 liters NC, hence patient is being downgraded to AI service.     9/2 - Failed TOV, daley reinserted  9/3 - Awtg PT recs for d/c  9/4 - PT rec TG, pending choices  LABS:  CBC Full  -  ( 08 Sep 2021 07:57 )  WBC Count : 7.66 K/uL  RBC Count : 3.36 M/uL  Hemoglobin : 10.7 g/dL  Hematocrit : 33.7 %  Platelet Count - Automated : 207 K/uL  Mean Cell Volume : 100.3 fl  Mean Cell Hemoglobin : 31.8 pg  Mean Cell Hemoglobin Concentration : 31.8 gm/dL  Auto Neutrophil # : x  Auto Lymphocyte # : x  Auto Monocyte # : x  Auto Eosinophil # : x  Auto Basophil # : x  Auto Neutrophil % : x  Auto Lymphocyte % : x  Auto Monocyte % : x  Auto Eosinophil % : x  Auto Basophil % : x    09-08    141  |  106  |  75<H>  ----------------------------<  96  4.2   |  31  |  3.85<H>    Ca    9.6      08 Sep 2021 07:57  Phos  4.5     09-07  Mg     2.5     09-07    TPro  6.9  /  Alb  2.5<L>  /  TBili  0.3  /  DBili  x   /  AST  x   /  ALT  x   /  AlkPhos  82  09-08                 61 year old man with morbid obesity from [facility] [place] ambulates w/ walker has past medical hx of COPD not on O2 at home, hypothyroidism, anemia, left DVT, CKD, BPH, HLD, bipolar disorder, ambulatory dysfunction, lymphedema was BIBEMS as patient was complaining of worsening lower extremity weakness, lightheaded and overall not feeling well for one day, SHEYLA on CKD on labs. Off note, patient with slurred speech at baseline since childhood and known ambulatory dysfunction, that is the reason why he is at [facility].     On 8/31, pt was noted to be increasingly lethargic and drowsy and was hypotensive as well. Rapid response team was called. Patient was only opening eyes to painful stimuli. Pinpoint pupils were noted. Chart review showed he received Dilaudid 4mg PO in the afternoon, Narcan 0.4mg was given and pt opened eyes.   ABG shows PCO2 of 100 for which pt was placed on BiPAP in ICU and improved. IV Lasix 40 mg added for euvolemic status. Pt was able to drink water and take pills 9/1, hence diet advanced to soft. Latest ABG 7.22/62/83/25 on 4 liters NC, hence patient is being downgraded to AI service.     9/2 - Failed TOV, daley reinserted  9/3 - Awtg PT recs for d/c  9/4 - PT rec TG, pending choices  LABS:  CBC Full  -  ( 08 Sep 2021 07:57 )  WBC Count : 7.66 K/uL  RBC Count : 3.36 M/uL  Hemoglobin : 10.7 g/dL  Hematocrit : 33.7 %  Platelet Count - Automated : 207 K/uL  Mean Cell Volume : 100.3 fl  Mean Cell Hemoglobin : 31.8 pg  Mean Cell Hemoglobin Concentration : 31.8 gm/dL  Auto Neutrophil # : x  Auto Lymphocyte # : x  Auto Monocyte # : x  Auto Eosinophil # : x  Auto Basophil # : x  Auto Neutrophil % : x  Auto Lymphocyte % : x  Auto Monocyte % : x  Auto Eosinophil % : x  Auto Basophil % : x    09-08    141  |  106  |  75<H>  ----------------------------<  96  4.2   |  31  |  3.85<H>    Ca    9.6      08 Sep 2021 07:57  Phos  4.5     09-07  Mg     2.5     09-07    TPro  6.9  /  Alb  2.5<L>  /  TBili  0.3  /  DBili  x   /  AST  x   /  ALT  x   /  AlkPhos  82  09-08        for a full account of hospital course please refer to actual medical records for this is a brief summery

## 2021-09-02 NOTE — PROGRESS NOTE ADULT - PROBLEM SELECTOR PLAN 1
CXR noted above  SPO2 95% on 2L  Pt takes Albuterol and Symbicort at home  Continue home regimen   Monitor oxygen saturations

## 2021-09-02 NOTE — CHART NOTE - NSCHARTNOTEFT_GEN_A_CORE
EVENT: Received telephone call from RN that pt failed trial of void. He had urinated 200cc but was retaining 652cc as per bladder scan report.    HPI: This is a 61yMale patient who presents to the hospital for Patient is a 61y old  Male who presents with a chief complaint of SHEYLA ON CKD, DEMAND ISCHEMIA, AMBULATORY DYSFUNCTION (02 Sep 2021 13:44)    Pt with chronic leg pain recently started on opioids, admitted with weakness and found to have worsening acute respiratory distress and lethargy.  + response to narcan.  + copd and sheyla on ckd.  Pt now out of icu and on bipap prn.     Subjective: n/a    OBJECTIVE:  Vital Signs Last 24 Hrs  T(C): 37.4 (02 Sep 2021 20:37), Max: 37.4 (02 Sep 2021 20:37)  T(F): 99.4 (02 Sep 2021 20:37), Max: 99.4 (02 Sep 2021 20:37)  HR: 80 (02 Sep 2021 20:37) (77 - 92)  BP: 106/50 (02 Sep 2021 20:37) (87/72 - 150/66)  BP(mean): 107 (02 Sep 2021 03:00) (75 - 107)  RR: 19 (02 Sep 2021 20:37) (14 - 23)  SpO2: 95% (02 Sep 2021 20:37) (94% - 99%)    FOCUSED PHYSICAL EXAM:  Neuro: Obese, awake, alert, oriented x 3. No neuro deficit  Cardiovascular: Pulses +2 B/L in lower and upper extremities, HR regular, BP stable, No edema.  Respiratory: Respirations regular, unlabored, breath sounds clear B/L.   GI: Abdomen soft, non-tender, positive bowel sounds.  : + Bladder distention noted.  Skin: Dry, intact, no bruising, no diaphoresis.    LABS:                        10.1   6.62  )-----------( 206      ( 02 Sep 2021 11:04 )             32.4     09-02    142  |  110<H>  |  55<H>  ----------------------------<  107<H>  5.0   |  29  |  3.62<H>    Ca    8.5      02 Sep 2021 11:04  Phos  4.2     09-02  Mg     2.5     09-02    TPro  7.2  /  Alb  2.4<L>  /  TBili  0.2  /  DBili  x   /  AST  11  /  ALT  12  /  AlkPhos  85  09-01      EKG:   IMAGING:    ASSESSMENT/PROBLEM: Urinary retention most likely 2/2 to BPH      PLAN:   1. Viera catheter ordered & placed  2. Monitor urine output  3. Cont present care/treatment  4. Supportive care EVENT: Received telephone call from RN that pt failed trial of void. He had urinated 200cc but was retaining 652cc as per bladder scan report.    HPI: This is a 61yMale patient who presents to the hospital for Patient is a 61y old  Male who presents with a chief complaint of SHEYLA ON CKD, DEMAND ISCHEMIA, AMBULATORY DYSFUNCTION (02 Sep 2021 13:44)    Pt with chronic leg pain recently started on opioids, admitted with weakness and found to have worsening acute respiratory distress and lethargy.  + response to narcan.  + copd and sheyla on ckd.  Pt now out of icu and on bipap prn.     Subjective: n/a    OBJECTIVE:  Vital Signs Last 24 Hrs  T(C): 37.4 (02 Sep 2021 20:37), Max: 37.4 (02 Sep 2021 20:37)  T(F): 99.4 (02 Sep 2021 20:37), Max: 99.4 (02 Sep 2021 20:37)  HR: 80 (02 Sep 2021 20:37) (77 - 92)  BP: 106/50 (02 Sep 2021 20:37) (87/72 - 150/66)  BP(mean): 107 (02 Sep 2021 03:00) (75 - 107)  RR: 19 (02 Sep 2021 20:37) (14 - 23)  SpO2: 95% (02 Sep 2021 20:37) (94% - 99%)    FOCUSED PHYSICAL EXAM:  Neuro: Obese, awake, alert, oriented x 3. No neuro deficit  Cardiovascular: Pulses +2 B/L in lower and upper extremities, HR regular, BP stable, No edema.  Respiratory: Respirations regular, unlabored, breath sounds clear B/L.   GI: Abdomen soft, non-tender, positive bowel sounds.  : + Bladder distention noted.  Skin: Dry, intact, no bruising, no diaphoresis.    LABS:                        10.1   6.62  )-----------( 206      ( 02 Sep 2021 11:04 )             32.4     09-02    142  |  110<H>  |  55<H>  ----------------------------<  107<H>  5.0   |  29  |  3.62<H>    Ca    8.5      02 Sep 2021 11:04  Phos  4.2     09-02  Mg     2.5     09-02    TPro  7.2  /  Alb  2.4<L>  /  TBili  0.2  /  DBili  x   /  AST  11  /  ALT  12  /  AlkPhos  85  09-01      EKG:   IMAGING:    ASSESSMENT/PROBLEM: Urinary retention most likely 2/2 to BPH      PLAN:   1. Viera catheter ordered & placed  2. Monitor urine output  3. Cont Flomax at HS as ordered  4. Cont present care/treatment  5. Supportive care

## 2021-09-02 NOTE — DISCHARGE NOTE PROVIDER - CARE PROVIDER_API CALL
Owen Phillips)  Medicine  [id number] [place] [address]  Phone: [phone]  Fax: [phone]  Follow Up Time: 1-3 days

## 2021-09-02 NOTE — PROGRESS NOTE ADULT - SUBJECTIVE AND OBJECTIVE BOX
Source of information: , Chart review  Patient language: English  : n/a    CC: chronic leg pain     This is a 61yMale patient who presents to the hospital for Patient is a 61y old  Male who presents with a chief complaint of SHEYLA ON CKD, DEMAND ISCHEMIA, AMBULATORY DYSFUNCTION (02 Sep 2021 13:44)    Pt with chronic leg pain recently started on opioids, admitted with weakness and found to have worsening acute respiratory distress and lethargy.  + response to narcan.  + copd and sheyla on ckd.  Pt now out of icu and on bipap prn. Pt is awake and not complaining  of pain.      PAIN SCORE:  3/10       SCALE USED: (1-10 NRS)      PAST MEDICAL & SURGICAL HISTORY:  Hypothyroid    Hyperlipidemia    Ambulatory dysfunction    Anemia    History of BPH    CKD (chronic kidney disease)    Chronic obstructive pulmonary disease (COPD)    Bipolar disorder        FAMILY HISTORY:        SOCIAL HISTORY:  [x ] Denies Smoking, Alcohol, or Drug Use    Allergies    No Known Allergies    Intolerances        MEDICATIONS:    MEDICATIONS  (STANDING):  ALBUTerol    90 MICROgram(s) HFA Inhaler 2 Puff(s) Inhalation <User Schedule>  aspirin enteric coated 81 milliGRAM(s) Oral daily  budesonide 160 MICROgram(s)/formoterol 4.5 MICROgram(s) Inhaler 2 Puff(s) Inhalation two times a day  buPROPion XL (24-Hour) . 150 milliGRAM(s) Oral daily  busPIRone 10 milliGRAM(s) Oral two times a day  calcitriol   Capsule 0.25 MICROGram(s) Oral daily  cholecalciferol 1000 Unit(s) Oral daily  diVALproex  milliGRAM(s) Oral <User Schedule>  ferrous    sulfate 325 milliGRAM(s) Oral daily  finasteride 5 milliGRAM(s) Oral daily  folic acid 1 milliGRAM(s) Oral daily  furosemide   Injectable 40 milliGRAM(s) IV Push daily  heparin   Injectable 5000 Unit(s) SubCutaneous every 8 hours  levothyroxine 125 MICROGram(s) Oral daily  lidocaine   4% Patch 1 Patch Transdermal daily  nicotine -  14 mG/24Hr(s) Patch 1 patch Transdermal daily  pantoprazole    Tablet 40 milliGRAM(s) Oral before breakfast  polyethylene glycol 3350 17 Gram(s) Oral daily  risperiDONE   Tablet 2 milliGRAM(s) Oral <User Schedule>  senna 2 Tablet(s) Oral at bedtime  sevelamer carbonate 800 milliGRAM(s) Oral three times a day with meals  simvastatin 20 milliGRAM(s) Oral at bedtime  tamsulosin 0.4 milliGRAM(s) Oral at bedtime    MEDICATIONS  (PRN):  acetaminophen   Tablet .. 1000 milliGRAM(s) Oral every 8 hours PRN Moderate Pain (4 - 6)      Vital Signs Last 24 Hrs  T(C): 37.2 (02 Sep 2021 12:13), Max: 37.2 (02 Sep 2021 12:13)  T(F): 99 (02 Sep 2021 12:13), Max: 99 (02 Sep 2021 12:13)  HR: 77 (02 Sep 2021 12:13) (71 - 92)  BP: 118/58 (02 Sep 2021 12:13) (73/38 - 150/66)  BP(mean): 107 (02 Sep 2021 03:00) (46 - 107)  RR: 15 (02 Sep 2021 12:13) (13 - 23)  SpO2: 95% (02 Sep 2021 12:13) (92% - 100%)    LABS:                          10.1   6.62  )-----------( 206      ( 02 Sep 2021 11:04 )             32.4     09-02    142  |  110<H>  |  55<H>  ----------------------------<  107<H>  5.0   |  29  |  3.62<H>    Ca    8.5      02 Sep 2021 11:04  Phos  4.2     09-02  Mg     2.5     09-02    TPro  7.2  /  Alb  2.4<L>  /  TBili  0.2  /  DBili  x   /  AST  11  /  ALT  12  /  AlkPhos  85  09-01      LIVER FUNCTIONS - ( 01 Sep 2021 05:22 )  Alb: 2.4 g/dL / Pro: 7.2 g/dL / ALK PHOS: 85 U/L / ALT: 12 U/L DA / AST: 11 U/L / GGT: x             CAPILLARY BLOOD GLUCOSE      POCT Blood Glucose.: 123 mg/dL (02 Sep 2021 11:47)  POCT Blood Glucose.: 105 mg/dL (02 Sep 2021 07:51)  POCT Blood Glucose.: 120 mg/dL (01 Sep 2021 21:36)  POCT Blood Glucose.: 93 mg/dL (01 Sep 2021 17:19)      Radiology:  < from: Xray Chest 1 View- PORTABLE-Routine (Xray Chest 1 View- PORTABLE-Routine in AM.) (09.01.21 @ 07:00) >  EXAM:  XR CHEST PORTABLE ROUTINE 1V                            PROCEDURE DATE:  09/01/2021          INTERPRETATION:  Chest one view    HISTORY: Pulmonary edema    COMPARISON STUDY: 8/30/2021    Frontal expiratory view of the chest shows the heart to be similar in size. The lungs show mild bilateral atelectasis. Pulmonary vascularity is within normal limits and there is no evidence of pneumothorax nor pleural effusion. Elevated left hemidiaphragm is again noted.    IMPRESSION:  Atelectasis. No evidence of pulmonary edema.    < end of copied text >      Drug Screen:        REVIEW OF SYSTEMS:  CONSTITUTIONAL: No fever or fatigue  RESPIRATORY: + decreased breath sounds + copd  CARDIOVASCULAR: No chest pain, palpitations, dizziness, or leg swelling  GASTROINTESTINAL: no abdomina pain + obesity   GENITOURINARY: No dysuria, frequency, hematuria, retention or incontinence  MUSCULOSKELETAL: + leg pain   NEURO: No headaches, No numbness/tingling b/l LE, No weakness  PSYCHIATRIC: No depression, anxiety, mood swings, or difficulty sleeping    PHYSICAL EXAM:  GENERAL:  Alert & Oriented X3, NAD  CHEST/LUNG: decreased breath sounds  HEART: Regular rate and rhythm; No murmurs, rubs, or gallops  ABDOMEN: Soft, Nontender, Nondistended; Bowel sounds present + obesity   EXTREMITIES:  2+ Peripheral Pulses, No cyanosis, or edema  MUSCULOSKELETAL: + decreased rom + bilateral leg pain  SKIN: No rashes or lesions    Risk factors associated with adverse outcomes related to opioid treatment  [ ]  Concurrent benzodiazepine use  [ ]  History/ Active substance use or alcohol use disorder  [ ] Psychiatric co-morbidity  [ ] Sleep apnea  [ x] COPD  [ ] BMI> 35  [ ] Liver dysfunction  [x Renal dysfunction  [ ] CHF  [ ] Smoker  x[ ]  Age > 60 years    [ x]  [place]  Reviewed and Copied to Chart. See below.    Plan of care and goal oriented pain management treatment options were discussed with patient and /or primary care giver; all questions and concerns were addressed and care was aligned with patient's wishes.    Educated patient on goal oriented pain management treatment options     09-02-21 @ 16:04

## 2021-09-02 NOTE — PROGRESS NOTE ADULT - ASSESSMENT
1. SHEYLA due to r/o ATN due to possible CRS  -scr is improving today and stable electrolytes; Patient has increased UO with lasix. continue lasix 40mg iv daily. TOV done.  -UA shows some proteinuria. -resend urine lytes (uosm, urine sodium, urine creatinine, chloride, potassium), spot protein to creatinine ratio  - renal sonogram shows no hydronephrosis with Right kidney 11.8cm and Left kidney 12.0cm with increase echogenicity   -Adjust meds to eGFR and avoid IV Gadolinium contrast,NSAIDs, and phosphate enema.  -Monitor I/O's daily.   -Monitor SMA daily.  2. CKD stage 4 most likely due to ischemic nephropathy  -baseline scr around 3.1mg/dL  -Keep patient euvolemic and renal diet  -Avoid Nephrotoxic Meds/ Agents such as (NSAIDs, IV contrast, Aminoglycosides such as gentamicin, -Gadolinium contrast, Phosphate containing enemas, etc..)  -Adjust Medications according to eGFR  3. Anemia:  -on iron tabs.   -F/u CBC daily  -transfuse if HB < 7.0.  3. Mineral Bone Disease:  -phos is elevated and on renvela 1 tab tid and Vitamin 25 OH is low and PTH intact is 330, add calcitriol 0.25mcg daily.   4. Respiratory Acidosis: on bipap and f/u echo. on iv lasix. cardiology consulted.       Discussed with patient in detail regarding the renal plan and care  Discussed the assessment and plan with Primary Team/Nurse

## 2021-09-02 NOTE — PROGRESS NOTE ADULT - SUBJECTIVE AND OBJECTIVE BOX
GAYLA PEARCE    SCU progress note    INTERVAL HPI/OVERNIGHT EVENTS: *** No new overnight events.  Seen and examined at bedside.     DNR [  ]   DNI  [  ]  FULL CODE [ x ]    Covid - 19 PCR:  Negative 8/30    The 4Ms    What Matters Most: see GO  Age appropriate Medications/Screen for High Risk Medication: Yes  Mentation: see CAM below  Mobility: defer to physical exam    The Confusion Assessment Method (CAM) Diagnostic Algorithm     1: Acute Onset or Fluctuating Course  - Is there evidence of an acute change in mental status from the patient’s baseline? Did the (abnormal) behavior  fluctuate during the day, that is, tend to come and go, or increase and decrease in severity?  [  ] YES [ x ] NO     2: Inattention  - Did the patient have difficulty focusing attention, being easily distractible, or having difficulty keeping track of what was being said?  [  ] YES [ x ] NO     3: Disorganized thinking  -Was the patient’s thinking disorganized or incoherent, such as rambling or irrelevant conversation, unclear or illogical flow of ideas, or unpredictable switching from subject to subject?  [  ] YES [ x ] NO    4: Altered Level of consciousness?  [  ] YES [ x ] NO    The diagnosis of delirium by CAM requires the presence of features 1 and 2 and either 3 or 4.    PRESSORS: [  ] YES [ x ] NO    Cardiovascular:  Heart Failure  Acute   Acute on Chronic  Chronic       furosemide   Injectable 40 milliGRAM(s) IV Push daily  tamsulosin 0.4 milliGRAM(s) Oral at bedtime    Pulmonary:  ALBUTerol    90 MICROgram(s) HFA Inhaler 2 Puff(s) Inhalation <User Schedule>  budesonide 160 MICROgram(s)/formoterol 4.5 MICROgram(s) Inhaler 2 Puff(s) Inhalation two times a day    Hematalogic:  aspirin enteric coated 81 milliGRAM(s) Oral daily  heparin   Injectable 5000 Unit(s) SubCutaneous every 8 hours    Other:  acetaminophen   Tablet .. 1000 milliGRAM(s) Oral every 8 hours PRN  buPROPion XL (24-Hour) . 150 milliGRAM(s) Oral daily  busPIRone 10 milliGRAM(s) Oral two times a day  calcitriol   Capsule 0.25 MICROGram(s) Oral daily  cholecalciferol 1000 Unit(s) Oral daily  diVALproex  milliGRAM(s) Oral <User Schedule>  ferrous    sulfate 325 milliGRAM(s) Oral daily  finasteride 5 milliGRAM(s) Oral daily  folic acid 1 milliGRAM(s) Oral daily  levothyroxine 125 MICROGram(s) Oral daily  lidocaine   4% Patch 1 Patch Transdermal daily  nicotine -  14 mG/24Hr(s) Patch 1 patch Transdermal daily  pantoprazole    Tablet 40 milliGRAM(s) Oral before breakfast  polyethylene glycol 3350 17 Gram(s) Oral daily  risperiDONE   Tablet 2 milliGRAM(s) Oral <User Schedule>  senna 2 Tablet(s) Oral at bedtime  sevelamer carbonate 800 milliGRAM(s) Oral three times a day with meals  simvastatin 20 milliGRAM(s) Oral at bedtime    acetaminophen   Tablet .. 1000 milliGRAM(s) Oral every 8 hours PRN  ALBUTerol    90 MICROgram(s) HFA Inhaler 2 Puff(s) Inhalation <User Schedule>  aspirin enteric coated 81 milliGRAM(s) Oral daily  budesonide 160 MICROgram(s)/formoterol 4.5 MICROgram(s) Inhaler 2 Puff(s) Inhalation two times a day  buPROPion XL (24-Hour) . 150 milliGRAM(s) Oral daily  busPIRone 10 milliGRAM(s) Oral two times a day  calcitriol   Capsule 0.25 MICROGram(s) Oral daily  cholecalciferol 1000 Unit(s) Oral daily  diVALproex  milliGRAM(s) Oral <User Schedule>  ferrous    sulfate 325 milliGRAM(s) Oral daily  finasteride 5 milliGRAM(s) Oral daily  folic acid 1 milliGRAM(s) Oral daily  furosemide   Injectable 40 milliGRAM(s) IV Push daily  heparin   Injectable 5000 Unit(s) SubCutaneous every 8 hours  levothyroxine 125 MICROGram(s) Oral daily  lidocaine   4% Patch 1 Patch Transdermal daily  nicotine -  14 mG/24Hr(s) Patch 1 patch Transdermal daily  pantoprazole    Tablet 40 milliGRAM(s) Oral before breakfast  polyethylene glycol 3350 17 Gram(s) Oral daily  risperiDONE   Tablet 2 milliGRAM(s) Oral <User Schedule>  senna 2 Tablet(s) Oral at bedtime  sevelamer carbonate 800 milliGRAM(s) Oral three times a day with meals  simvastatin 20 milliGRAM(s) Oral at bedtime  tamsulosin 0.4 milliGRAM(s) Oral at bedtime    Drug Dosing Weight  Height (cm): 172.7 (30 Aug 2021 08:52)  Weight (kg): 135.1 (31 Aug 2021 22:45)  BMI (kg/m2): 45.3 (31 Aug 2021 22:45)  BSA (m2): 2.42 (31 Aug 2021 22:45)    CENTRAL LINE: [  ] YES [ x ] NO  LOCATION:   DATE INSERTED:  REMOVE: [  ] YES [  ] NO  EXPLAIN:    HATFIELD: [  ] YES [ x ] NO    DATE INSERTED:  REMOVE:  [  ] YES [  ] NO  EXPLAIN:    PAST MEDICAL & SURGICAL HISTORY:  Hypothyroid    Hyperlipidemia    Ambulatory dysfunction    Anemia    History of BPH    CKD (chronic kidney disease)    Chronic obstructive pulmonary disease (COPD)    Bipolar disorder          ABG - ( 01 Sep 2021 18:19 )  pH, Arterial: 7.22  pH, Blood: x     /  pCO2: 62    /  pO2: 83    / HCO3: 25    / Base Excess: -3.5  /  SaO2: 97                    09-01 @ 07:01  -  09-02 @ 07:00  --------------------------------------------------------  IN: 240.4 mL / OUT: 3375 mL / NET: -3134.6 mL            PHYSICAL EXAM:    GENERAL: NAD, well-groomed, well-developed  HEAD:  Atraumatic, Normocephalic  EYES: PERRLA, conjunctiva and sclera clear  ENMT: No tonsillar erythema, exudates, or enlargement; Moist mucous membranes, Good dentition, No lesions  NECK: Supple, No JVD, Normal thyroid  NERVOUS SYSTEM:  Alert & Oriented X3, Good concentration; Motor Strength 5/5 B/L upper and lower extremities  CHEST/LUNG: Clear to auscultation bilaterally; No rales, rhonchi, wheezing, or rubs  HEART: Regular rate and rhythm; No murmurs, rubs, or gallops  ABDOMEN: Soft, Nontender, Nondistended; Bowel sounds present  EXTREMITIES:  + Peripheral Pulses, No clubbing, cyanosis, or edema  LYMPH: No lymphadenopathy noted  SKIN: Warm and dry; No rashes or lesions      LABS:  CBC Full  -  ( 02 Sep 2021 11:04 )  WBC Count : 6.62 K/uL  RBC Count : 3.19 M/uL  Hemoglobin : 10.1 g/dL  Hematocrit : 32.4 %  Platelet Count - Automated : 206 K/uL  Mean Cell Volume : 101.6 fl  Mean Cell Hemoglobin : 31.7 pg  Mean Cell Hemoglobin Concentration : 31.2 gm/dL  Auto Neutrophil # : 4.73 K/uL  Auto Lymphocyte # : 0.86 K/uL  Auto Monocyte # : 0.87 K/uL  Auto Eosinophil # : 0.08 K/uL  Auto Basophil # : 0.02 K/uL  Auto Neutrophil % : 71.5 %  Auto Lymphocyte % : 13.0 %  Auto Monocyte % : 13.1 %  Auto Eosinophil % : 1.2 %  Auto Basophil % : 0.3 %    09-02    142  |  110<H>  |  55<H>  ----------------------------<  107<H>  5.0   |  29  |  3.62<H>    Ca    8.5      02 Sep 2021 11:04  Phos  4.2     09-02  Mg     2.5     09-02    TPro  7.2  /  Alb  2.4<L>  /  TBili  0.2  /  DBili  x   /  AST  11  /  ALT  12  /  AlkPhos  85  09-01              [  ]  DVT Prophylaxis  [  ]  Nutrition, Brand, Rate (  Goal Rate)          Abnormal Nutritional Status -  Malnutrition   Cachexia      Morbid Obesity BMI >/=40    RADIOLOGY & ADDITIONAL STUDIES:  ***     < from: US Renal (09.02.21 @ 11:05) >  Bilateral increased renal cortical echogenicity.    Right kidney: 11.8 cm. No hydronephrosis.    Left kidney: 12.0 cm. No hydronephrosis.    Urinary bladder: Collapsed.    IMPRESSION:    Technically limited exam.    Increased renal cortical echogenicity compatible with medical renal disease.    --- End of Report ---    < end of copied text >    < from: Xray Chest 1 View- PORTABLE-Routine (Xray Chest 1 View- PORTABLE-Routine in AM.) (09.01.21 @ 07:00) >  Frontal expiratory view of the chest shows the heart to be similar in size. The lungs show mild bilateral atelectasis. Pulmonary vascularity is within normal limits and there is no evidence of pneumothorax nor pleural effusion. Elevated left hemidiaphragm is again noted.    IMPRESSION:  Atelectasis. No evidence of pulmonary edema.    < end of copied text >    Goals of Care Discussion with Family/Proxy/Other   - see note from/family meeting set up for...

## 2021-09-02 NOTE — PROGRESS NOTE ADULT - ATTENDING COMMENTS
Assessment:  1. Acute Encephalopathy  2. Acute Hypercapnic Respiratory failure   3. Opioid Overdose  4. Bipolar Disorder  5. COPD  6. CKD stage 4  7. Hypothyroidism  8. Anemia  9. Class 3 Obesity - likely underlying TEJ and OHS    Plan  - Cont. NIV support as tolerated  - Repeat ABG is improved  - Avoid opioids for now, monitor neurologic status  - Pain service consult for pain management  - IV lasix to aim for euvolemic volume status   - Cont. Bronchodilators, add albuterol/Symbicort  - Nicotine patch as current smoker  - May have underlying COPD, should get out patient PFTs  - Aspiration precautions  - Cont. Psych medications  - Oral diet  - DVT and stress ulcer prophylaxis

## 2021-09-02 NOTE — PROGRESS NOTE ADULT - SUBJECTIVE AND OBJECTIVE BOX
NEPHROLOGY MEDICAL CARE, [facility] - Dr. Taj Valenzuela/ Dr. Hazel Solis/ Dr. Cosmo Root/ Dr. Fang Gifford    Patient was seen and examined at bedside.    CC: patient is okay and no sob    Vital Signs Last 24 Hrs  T(C): 37.2 (02 Sep 2021 12:13), Max: 37.2 (02 Sep 2021 12:13)  T(F): 99 (02 Sep 2021 12:13), Max: 99 (02 Sep 2021 12:13)  HR: 77 (02 Sep 2021 12:13) (71 - 92)  BP: 118/58 (02 Sep 2021 12:13) (73/38 - 150/66)  BP(mean): 107 (02 Sep 2021 03:00) (46 - 107)  RR: 15 (02 Sep 2021 12:13) (13 - 24)  SpO2: 95% (02 Sep 2021 12:13) (92% - 100%)    09-01 @ 07:01  -  09-02 @ 07:00  --------------------------------------------------------  IN: 240.4 mL / OUT: 3375 mL / NET: -3134.6 mL    09-02 @ 07:01  -  09-02 @ 13:44  --------------------------------------------------------  IN: 0 mL / OUT: 2125 mL / NET: -2125 mL        PHYSICAL EXAM:  General: No acute respiratory distress; obese  Eyes: conjunctiva and sclera clear  ENMT: Atraumatic, Normocephalic, supple, No JVD present. Moist mucous membranes  Respiratory: b/l poor air entry; No rales, rhonchi, wheezing  Cardiovasular: S1S2+; no m/r/g  Gastrointestinal: Soft, Non-tender, Nondistended; Bowel sounds present  Neuro:  Awake, Alert & Oriented X3  Ext:  2+ Peripheral Pulses and 2+ pedal edema with chronic venous stasis; No Cyanosis  Skin: chronic vascular changes of the b/l legs.      MEDICATIONS:  MEDICATIONS  (STANDING):  ALBUTerol    90 MICROgram(s) HFA Inhaler 2 Puff(s) Inhalation <User Schedule>  aspirin enteric coated 81 milliGRAM(s) Oral daily  budesonide 160 MICROgram(s)/formoterol 4.5 MICROgram(s) Inhaler 2 Puff(s) Inhalation two times a day  buPROPion XL (24-Hour) . 150 milliGRAM(s) Oral daily  busPIRone 10 milliGRAM(s) Oral two times a day  calcitriol   Capsule 0.25 MICROGram(s) Oral daily  cholecalciferol 1000 Unit(s) Oral daily  diVALproex  milliGRAM(s) Oral <User Schedule>  ferrous    sulfate 325 milliGRAM(s) Oral daily  finasteride 5 milliGRAM(s) Oral daily  folic acid 1 milliGRAM(s) Oral daily  furosemide   Injectable 40 milliGRAM(s) IV Push daily  heparin   Injectable 5000 Unit(s) SubCutaneous every 8 hours  levothyroxine 125 MICROGram(s) Oral daily  lidocaine   4% Patch 1 Patch Transdermal daily  nicotine -  14 mG/24Hr(s) Patch 1 patch Transdermal daily  pantoprazole    Tablet 40 milliGRAM(s) Oral before breakfast  polyethylene glycol 3350 17 Gram(s) Oral daily  risperiDONE   Tablet 2 milliGRAM(s) Oral <User Schedule>  senna 2 Tablet(s) Oral at bedtime  sevelamer carbonate 800 milliGRAM(s) Oral three times a day with meals  simvastatin 20 milliGRAM(s) Oral at bedtime  tamsulosin 0.4 milliGRAM(s) Oral at bedtime    MEDICATIONS  (PRN):  acetaminophen   Tablet .. 1000 milliGRAM(s) Oral every 8 hours PRN Moderate Pain (4 - 6)          LABS:                        10.1   6.62  )-----------( 206      ( 02 Sep 2021 11:04 )             32.4     09-02    142  |  110<H>  |  55<H>  ----------------------------<  107<H>  5.0   |  29  |  3.62<H>    Ca    8.5      02 Sep 2021 11:04  Phos  4.2     09-02  Mg     2.5     09-02    TPro  7.2  /  Alb  2.4<L>  /  TBili  0.2  /  DBili  x   /  AST  11  /  ALT  12  /  AlkPhos  85  09-01        Magnesium, Serum: 2.5 mg/dL (09-02 @ 11:04)  Phosphorus Level, Serum: 4.2 mg/dL (09-02 @ 11:04)    Urine studies    PTH and Vit D:

## 2021-09-02 NOTE — PROGRESS NOTE ADULT - PROBLEM SELECTOR PLAN 1
Chronic pain of left lower extremity.   ·  Recommendation: Pt with chronic le pain.  Pt recently prescribed hydromorphone 4mg po daily.  Pt with copd and shruthi on ckd. Pt is also most likely opioid naive.  Hydromorphone with copd and ckd led to increased risk of resp depression  nonopioid recc  - acetaminophen 1000mg po q 8 hours prn moderate pian  - no nsaids due to ckd  - lidocaine patches to LE  - will avoid gabapentin at this time as pt with shruthi on ckd  opioid recc   - none at this time.  pt has not been on opioids long term so no risk of withdrawal.   bowel regimen  - senna  Will refer to pain mgt as outpt.  [facility] - [phone]

## 2021-09-02 NOTE — PROGRESS NOTE ADULT - SUBJECTIVE AND OBJECTIVE BOX
`Patient is a 61y old  Male who presents with a chief complaint of SHEYLA ON CKD, DEMAND ISCHEMIA, AMBULATORY DYSFUNCTION (02 Sep 2021 06:43)    PATIENT IS SEEN AND EXAMINED IN MEDICAL FLOOR.  SOWMYA [    ]    ALYSIA [   ]      GT [   ]    ALLERGIES:  No Known Allergies      Daily     Daily     VITALS:    Vital Signs Last 24 Hrs  T(C): 36.7 (02 Sep 2021 04:20), Max: 37.1 (01 Sep 2021 19:00)  T(F): 98.1 (02 Sep 2021 04:20), Max: 98.7 (01 Sep 2021 19:00)  HR: 77 (02 Sep 2021 08:30) (71 - 92)  BP: 150/66 (02 Sep 2021 04:20) (73/38 - 150/66)  BP(mean): 107 (02 Sep 2021 03:00) (46 - 107)  RR: 20 (02 Sep 2021 04:20) (12 - 29)  SpO2: 94% (02 Sep 2021 08:30) (92% - 100%)    LABS:    CBC Full  -  ( 01 Sep 2021 05:22 )  WBC Count : 9.17 K/uL  RBC Count : 3.52 M/uL  Hemoglobin : 11.4 g/dL  Hematocrit : 37.4 %  Platelet Count - Automated : 195 K/uL  Mean Cell Volume : 106.3 fl  Mean Cell Hemoglobin : 32.4 pg  Mean Cell Hemoglobin Concentration : 30.5 gm/dL  Auto Neutrophil # : 6.68 K/uL  Auto Lymphocyte # : 0.94 K/uL  Auto Monocyte # : 1.39 K/uL  Auto Eosinophil # : 0.01 K/uL  Auto Basophil # : 0.02 K/uL  Auto Neutrophil % : 72.8 %  Auto Lymphocyte % : 10.3 %  Auto Monocyte % : 15.2 %  Auto Eosinophil % : 0.1 %  Auto Basophil % : 0.2 %      09-01    142  |  111<H>  |  49<H>  ----------------------------<  99  4.9   |  25  |  4.31<H>    Ca    8.7      01 Sep 2021 05:22  Phos  6.6     09-01  Mg     2.6     09-01    TPro  7.2  /  Alb  2.4<L>  /  TBili  0.2  /  DBili  x   /  AST  11  /  ALT  12  /  AlkPhos  85  09-01    CAPILLARY BLOOD GLUCOSE      POCT Blood Glucose.: 105 mg/dL (02 Sep 2021 07:51)  POCT Blood Glucose.: 120 mg/dL (01 Sep 2021 21:36)  POCT Blood Glucose.: 93 mg/dL (01 Sep 2021 17:19)  POCT Blood Glucose.: 101 mg/dL (01 Sep 2021 11:30)        LIVER FUNCTIONS - ( 01 Sep 2021 05:22 )  Alb: 2.4 g/dL / Pro: 7.2 g/dL / ALK PHOS: 85 U/L / ALT: 12 U/L DA / AST: 11 U/L / GGT: x           Creatinine Trend: 4.31<--, 4.01<--, 3.82<--, 3.86<--  I&O's Summary    01 Sep 2021 07:01  -  02 Sep 2021 07:00  --------------------------------------------------------  IN: 240.4 mL / OUT: 3375 mL / NET: -3134.6 mL    02 Sep 2021 07:01  -  02 Sep 2021 10:27  --------------------------------------------------------  IN: 0 mL / OUT: 1575 mL / NET: -1575 mL        ABG - ( 01 Sep 2021 18:19 )  pH, Arterial: 7.22  pH, Blood: x     /  pCO2: 62    /  pO2: 83    / HCO3: 25    / Base Excess: -3.5  /  SaO2: 97                  Clean Catch Clean Catch (Midstream)  08-30 @ 21:13   <10,000 CFU/mL Normal Urogenital Ivelisse  --  --          MEDICATIONS:    MEDICATIONS  (STANDING):  ALBUTerol    90 MICROgram(s) HFA Inhaler 2 Puff(s) Inhalation <User Schedule>  aspirin enteric coated 81 milliGRAM(s) Oral daily  budesonide 160 MICROgram(s)/formoterol 4.5 MICROgram(s) Inhaler 2 Puff(s) Inhalation two times a day  buPROPion XL (24-Hour) . 150 milliGRAM(s) Oral daily  busPIRone 10 milliGRAM(s) Oral two times a day  calcitriol   Capsule 0.25 MICROGram(s) Oral daily  cholecalciferol 1000 Unit(s) Oral daily  diVALproex  milliGRAM(s) Oral <User Schedule>  ferrous    sulfate 325 milliGRAM(s) Oral daily  finasteride 5 milliGRAM(s) Oral daily  folic acid 1 milliGRAM(s) Oral daily  furosemide   Injectable 40 milliGRAM(s) IV Push daily  heparin   Injectable 5000 Unit(s) SubCutaneous every 8 hours  levothyroxine 125 MICROGram(s) Oral daily  lidocaine   4% Patch 1 Patch Transdermal daily  nicotine -  14 mG/24Hr(s) Patch 1 patch Transdermal daily  pantoprazole    Tablet 40 milliGRAM(s) Oral before breakfast  polyethylene glycol 3350 17 Gram(s) Oral daily  risperiDONE   Tablet 2 milliGRAM(s) Oral <User Schedule>  senna 2 Tablet(s) Oral at bedtime  sevelamer carbonate 800 milliGRAM(s) Oral three times a day with meals  simvastatin 20 milliGRAM(s) Oral at bedtime  tamsulosin 0.4 milliGRAM(s) Oral at bedtime      MEDICATIONS  (PRN):  acetaminophen   Tablet .. 1000 milliGRAM(s) Oral every 8 hours PRN Moderate Pain (4 - 6)      REVIEW OF SYSTEMS:                           ALL ROS DONE [ X   ]    CONSTITUTIONAL:  LETHARGIC [   ], FEVER [   ], UNRESPONSIVE [   ]  CVS:  CP  [   ], SOB, [   ], PALPITATIONS [   ], DIZZYNESS [   ]  RS: COUGH [   ], SPUTUM [   ]  GI: ABDOMINAL PAIN [   ], NAUSEA [   ], VOMITINGS [   ], DIARRHEA [   ], CONSTIPATION [   ]  :  DYSURIA [   ], NOCTURIA [   ], INCREASED FREQUENCY [   ], DRIBLING [   ],  SKELETAL: PAINFUL JOINTS [   ], SWOLLEN JOINTS [   ], NECK ACHE [   ], LOW BACK ACHE [   ],  SKIN : ULCERS [   ], RASH [   ], ITCHING [   ]  CNS: HEAD ACHE [   ], DOUBLE VISION [   ], BLURRED VISION [   ], AMS / CONFUSION [   ], SEIZURES [   ], WEAKNESS [   ],TINGLING / NUMBNESS [   ]    PHYSICAL EXAMINATION:  GENERAL APPEARANCE: NO DISTRESS  HEENT:  NO PALLOR, NO  JVD,  NO   NODES, NECK SUPPLE  CVS: S1 +, S2 +,   RS: AEEB,  OCCASIONAL  RALES +,   NO RONCHI  ABD: SOFT, NT, NO, BS +  EXT: PE ++  SKIN: WARM,   SKELETAL:  ROM ACCEPTABLE  CNS:  AAO X 2     RADIOLOGY :    RADIOLOGY REVIEWED    ASSESSMENT :     Weakness    No pertinent past medical history    Hypothyroid    Hyperlipidemia    Schizophrenia    Schizoaffective disorder    Ambulatory dysfunction    Anemia    History of BPH    CKD (chronic kidney disease)    Chronic obstructive pulmonary disease (COPD)    Bipolar disorder    Bipolar disorder    No significant past surgical history        PLAN:  HPI:  61 year old man with morbid obesity from [facility] [place] amb[place] w/ walker has past medical hx of COPD not on O2 at home, hypothyroidism, anemia, left DVT, CKD, BPH, HLD, bipolar disorder, ambulatory dysfunction, lymphedema was BIBEMS as patient was complaining of worsening lower extremity weakness, lightheaded and overall not feeling well for one day. Of note, patient with slurred speech at baseline since childhood and known ambulatory dysfunction reason why he is on nursing home. Patient denies chest pain, palpitations, sob or other symptoms.      [facility] full code    (30 Aug 2021 17:02)    # RRT FOR AMS [8/31] FOUND TO HAVE ACUTE HYPERCAPNIA - GIVEN NARCAN [PATIENT'S RESPONSIVENESS IMPROVED], NARCOTICS DISCONTINUED, TRANSFERRED TO ICU ON BIPAP  - PAIN MGMT CONSULT  # HYPOGLYCEMIA S/T POOR PO INTAKE - MONITOR BG  # ELEVATED TROPONIN - TREND, ECHOCARDIOGRAM - W/ MILD PULM HTN, CARDIOLOGY CONSULT  # SHEYLA ON CKD - HATFIELD PLACED, MONITOR CR, AVOID NEPHROTOXIC AGENTS - ? ATN  - MONITOR IS AND OS  - HOLD DIURETICS  - NEPHROLOGY CONSULT  # COPD  # HYPOTHYROIDISM  # MORBID OBESITY  # CHRONIC VENOUS INSUFFICIENCY, HX OF LEFT DVT  # F/U PT EVAL  # GI AND DVT PPX     `Patient is a 61y old  Male who presents with a chief complaint of SHEYLA ON CKD, DEMAND ISCHEMIA, AMBULATORY DYSFUNCTION (02 Sep 2021 06:43)    PATIENT IS SEEN AND EXAMINED IN MEDICAL FLOOR.    ALLERGIES:  No Known Allergies    VITALS:    Vital Signs Last 24 Hrs  T(C): 36.7 (02 Sep 2021 04:20), Max: 37.1 (01 Sep 2021 19:00)  T(F): 98.1 (02 Sep 2021 04:20), Max: 98.7 (01 Sep 2021 19:00)  HR: 77 (02 Sep 2021 08:30) (71 - 92)  BP: 150/66 (02 Sep 2021 04:20) (73/38 - 150/66)  BP(mean): 107 (02 Sep 2021 03:00) (46 - 107)  RR: 20 (02 Sep 2021 04:20) (12 - 29)  SpO2: 94% (02 Sep 2021 08:30) (92% - 100%)    LABS:    CBC Full  -  ( 01 Sep 2021 05:22 )  WBC Count : 9.17 K/uL  RBC Count : 3.52 M/uL  Hemoglobin : 11.4 g/dL  Hematocrit : 37.4 %  Platelet Count - Automated : 195 K/uL  Mean Cell Volume : 106.3 fl  Mean Cell Hemoglobin : 32.4 pg  Mean Cell Hemoglobin Concentration : 30.5 gm/dL  Auto Neutrophil # : 6.68 K/uL  Auto Lymphocyte # : 0.94 K/uL  Auto Monocyte # : 1.39 K/uL  Auto Eosinophil # : 0.01 K/uL  Auto Basophil # : 0.02 K/uL  Auto Neutrophil % : 72.8 %  Auto Lymphocyte % : 10.3 %  Auto Monocyte % : 15.2 %  Auto Eosinophil % : 0.1 %  Auto Basophil % : 0.2 %      09-01    142  |  111<H>  |  49<H>  ----------------------------<  99  4.9   |  25  |  4.31<H>    Ca    8.7      01 Sep 2021 05:22  Phos  6.6     09-01  Mg     2.6     09-01    TPro  7.2  /  Alb  2.4<L>  /  TBili  0.2  /  DBili  x   /  AST  11  /  ALT  12  /  AlkPhos  85  09-01    CAPILLARY BLOOD GLUCOSE      POCT Blood Glucose.: 105 mg/dL (02 Sep 2021 07:51)  POCT Blood Glucose.: 120 mg/dL (01 Sep 2021 21:36)  POCT Blood Glucose.: 93 mg/dL (01 Sep 2021 17:19)  POCT Blood Glucose.: 101 mg/dL (01 Sep 2021 11:30)        LIVER FUNCTIONS - ( 01 Sep 2021 05:22 )  Alb: 2.4 g/dL / Pro: 7.2 g/dL / ALK PHOS: 85 U/L / ALT: 12 U/L DA / AST: 11 U/L / GGT: x           Creatinine Trend: 4.31<--, 4.01<--, 3.82<--, 3.86<--  I&O's Summary    01 Sep 2021 07:01  -  02 Sep 2021 07:00  --------------------------------------------------------  IN: 240.4 mL / OUT: 3375 mL / NET: -3134.6 mL    02 Sep 2021 07:01  -  02 Sep 2021 10:27  --------------------------------------------------------  IN: 0 mL / OUT: 1575 mL / NET: -1575 mL        ABG - ( 01 Sep 2021 18:19 )  pH, Arterial: 7.22  pH, Blood: x     /  pCO2: 62    /  pO2: 83    / HCO3: 25    / Base Excess: -3.5  /  SaO2: 97                  Clean Catch Clean Catch (Midstream)  08-30 @ 21:13   <10,000 CFU/mL Normal Urogenital Ivelisse  --  --          MEDICATIONS:    MEDICATIONS  (STANDING):  ALBUTerol    90 MICROgram(s) HFA Inhaler 2 Puff(s) Inhalation <User Schedule>  aspirin enteric coated 81 milliGRAM(s) Oral daily  budesonide 160 MICROgram(s)/formoterol 4.5 MICROgram(s) Inhaler 2 Puff(s) Inhalation two times a day  buPROPion XL (24-Hour) . 150 milliGRAM(s) Oral daily  busPIRone 10 milliGRAM(s) Oral two times a day  calcitriol   Capsule 0.25 MICROGram(s) Oral daily  cholecalciferol 1000 Unit(s) Oral daily  diVALproex  milliGRAM(s) Oral <User Schedule>  ferrous    sulfate 325 milliGRAM(s) Oral daily  finasteride 5 milliGRAM(s) Oral daily  folic acid 1 milliGRAM(s) Oral daily  furosemide   Injectable 40 milliGRAM(s) IV Push daily  heparin   Injectable 5000 Unit(s) SubCutaneous every 8 hours  levothyroxine 125 MICROGram(s) Oral daily  lidocaine   4% Patch 1 Patch Transdermal daily  nicotine -  14 mG/24Hr(s) Patch 1 patch Transdermal daily  pantoprazole    Tablet 40 milliGRAM(s) Oral before breakfast  polyethylene glycol 3350 17 Gram(s) Oral daily  risperiDONE   Tablet 2 milliGRAM(s) Oral <User Schedule>  senna 2 Tablet(s) Oral at bedtime  sevelamer carbonate 800 milliGRAM(s) Oral three times a day with meals  simvastatin 20 milliGRAM(s) Oral at bedtime  tamsulosin 0.4 milliGRAM(s) Oral at bedtime      MEDICATIONS  (PRN):  acetaminophen   Tablet .. 1000 milliGRAM(s) Oral every 8 hours PRN Moderate Pain (4 - 6)      REVIEW OF SYSTEMS:                           ALL ROS DONE [ X   ]    CONSTITUTIONAL:  LETHARGIC [   ], FEVER [   ], UNRESPONSIVE [   ]  CVS:  CP  [   ], SOB, [   ], PALPITATIONS [   ], DIZZYNESS [   ]  RS: COUGH [   ], SPUTUM [   ]  GI: ABDOMINAL PAIN [   ], NAUSEA [   ], VOMITINGS [   ], DIARRHEA [   ], CONSTIPATION [   ]  :  DYSURIA [   ], NOCTURIA [   ], INCREASED FREQUENCY [   ], DRIBLING [   ],  SKELETAL: PAINFUL JOINTS [   ], SWOLLEN JOINTS [   ], NECK ACHE [   ], LOW BACK ACHE [   ],  SKIN : ULCERS [   ], RASH [   ], ITCHING [   ]  CNS: HEAD ACHE [   ], DOUBLE VISION [   ], BLURRED VISION [   ], AMS / CONFUSION [   ], SEIZURES [   ], WEAKNESS [   ],TINGLING / NUMBNESS [   ]    PHYSICAL EXAMINATION:  GENERAL APPEARANCE: NO DISTRESS  HEENT:  NO PALLOR, NO  JVD,  NO   NODES, NECK SUPPLE  CVS: S1 +, S2 +,   RS: AEEB,  OCCASIONAL  RALES +,   NO RONCHI  ABD: SOFT, NT, NO, BS +  EXT: PE ++  SKIN: WARM,   SKELETAL:  ROM ACCEPTABLE  CNS:  AAO X 2     RADIOLOGY :    RADIOLOGY REVIEWED    ASSESSMENT :     Weakness    No pertinent past medical history    Hypothyroid    Hyperlipidemia    Schizophrenia    Schizoaffective disorder    Ambulatory dysfunction    Anemia    History of BPH    CKD (chronic kidney disease)    Chronic obstructive pulmonary disease (COPD)    Bipolar disorder    Bipolar disorder    No significant past surgical history        PLAN:  HPI:  61 year old man with morbid obesity from [facility] [place] ambulates w/ walker has past medical hx of COPD not on O2 at home, hypothyroidism, anemia, left DVT, CKD, BPH, HLD, bipolar disorder, ambulatory dysfunction, lymphedema was BIBEMS as patient was complaining of worsening lower extremity weakness, lightheaded and overall not feeling well for one day. Of note, patient with slurred speech at baseline since childhood and known ambulatory dysfunction reason why he is on long-term. Patient denies chest pain, palpitations, sob or other symptoms.      [facility] full code    (30 Aug 2021 17:02)    # RRT FOR AMS [8/31] FOUND TO HAVE ACUTE HYPERCAPNIA - GIVEN NARCAN [PATIENT'S RESPONSIVENESS IMPROVED], NARCOTICS DISCONTINUED, TRANSFERRED TO ICU ON BIPAP  - PAIN MGMT CONSULT  # HYPOGLYCEMIA S/T POOR PO INTAKE - MONITOR BG  # ELEVATED TROPONIN - TREND, ECHOCARDIOGRAM - W/ MILD PULM HTN, CARDIOLOGY CONSULT  # SHEYLA ON CKD - IMPROVING - S/P HATFIELD, MONITOR CR, AVOID NEPHROTOXIC AGENTS - ? ATN  - MONITOR IS AND OS  - HOLD DIURETICS  - NEPHROLOGY CONSULT  # COPD  # HYPOTHYROIDISM  # MORBID OBESITY  # CHRONIC VENOUS INSUFFICIENCY, HX OF LEFT DVT  # F/U PT EVAL  # GI AND DVT PPX

## 2021-09-02 NOTE — PHARMACOTHERAPY INTERVENTION NOTE - COMMENTS
94 mcg IV PUSH daily changed to 125 mcg ORAL daily-pt.tolerates soft diet and other oral medications

## 2021-09-02 NOTE — PROGRESS NOTE ADULT - ASSESSMENT
61 year old man with morbid obesity from [facility] [place] ambulates w/ walker has past medical hx of COPD not on O2 at home, hypothyroidism, anemia, left DVT, CKD, BPH, HLD, bipolar disorder, initially admitted for increased lethargy is admitted for hypercapnic respiratroy failure requiring BIPAP

## 2021-09-02 NOTE — DISCHARGE NOTE PROVIDER - NSDCMRMEDTOKEN_GEN_ALL_CORE_FT
aspirin 81 mg oral tablet: 1 tab(s) orally once a day  buPROPion 75 mg oral tablet: 1 tab(s) orally 2 times a day  busPIRone 10 mg oral tablet: 1 tab(s) orally 2 times a day  Dilaudid 4 mg oral tablet: 1 tab(s) orally once a day  divalproex sodium 500 mg oral tablet, extended release: 1 tab(s) orally 2 times a day  FeroSul 325 mg (65 mg elemental iron) oral tablet: 1 tab(s) orally once a day   folic acid 1 mg oral tablet: 1 tab(s) orally once a day  Milk of Magnesia 8% oral suspension: 30 milliliter(s) orally once a day M W F  ProAir HFA 90 mcg/inh inhalation aerosol: 2 puff(s) inhaled every 8 hours  Proscar 5 mg oral tablet: 1 tab(s) orally once a day  RisperDAL 2 mg oral tablet: 1 tab(s) orally once a day 8pm  Senna 8.6 mg oral tablet: 2 tab(s) orally once a day (at bedtime)  Simbrinza 1%- 0.2% ophthalmic suspension: 1 drop(s) to each affected eye 2 times a day Right eye  Symbicort 160 mcg-4.5 mcg/inh inhalation aerosol: 2 puff(s) inhaled 2 times a day  Synthroid 125 mcg (0.125 mg) oral tablet: 1 tab(s) orally once a day  tamsulosin 0.4 mg oral capsule: 1 cap(s) orally once a day  Tylenol 325 mg oral tablet: 2 tab(s) orally 2 times a day  Vitamin D3 25 mcg (1000 intl units) oral tablet: 1 tab(s) orally once a day  Zocor 20 mg oral tablet: 1 tab(s) orally once a day (at bedtime)   aspirin 81 mg oral tablet: 1 tab(s) orally once a day  buPROPion 75 mg oral tablet: 1 tab(s) orally 2 times a day  busPIRone 10 mg oral tablet: 1 tab(s) orally 2 times a day  calcitriol 0.25 mcg oral capsule: 1 cap(s) orally once a day  Dilaudid 4 mg oral tablet: 1 tab(s) orally once a day  divalproex sodium 500 mg oral tablet, extended release: 1 tab(s) orally 2 times a day  FeroSul 325 mg (65 mg elemental iron) oral tablet: 1 tab(s) orally once a day   folic acid 1 mg oral tablet: 1 tab(s) orally once a day  furosemide 40 mg oral tablet: 1 tab(s) orally once a day  lidocaine 4% topical film: Apply topically to affected area once a day  Milk of Magnesia 8% oral suspension: 30 milliliter(s) orally once a day M W F  nicotine 14 mg/24 hr transdermal film, extended release: 1 patch transdermal once a day  pantoprazole 40 mg oral delayed release tablet: 1 tab(s) orally once a day (before a meal)  ProAir HFA 90 mcg/inh inhalation aerosol: 2 puff(s) inhaled every 8 hours  Proscar 5 mg oral tablet: 1 tab(s) orally once a day  RisperDAL 2 mg oral tablet: 1 tab(s) orally once a day 8pm  Senna 8.6 mg oral tablet: 2 tab(s) orally once a day (at bedtime)  sevelamer carbonate 800 mg oral tablet: 1 tab(s) orally 3 times a day (with meals)  Simbrinza 1%- 0.2% ophthalmic suspension: 1 drop(s) to each affected eye 2 times a day Right eye  Symbicort 160 mcg-4.5 mcg/inh inhalation aerosol: 2 puff(s) inhaled 2 times a day  Synthroid 125 mcg (0.125 mg) oral tablet: 1 tab(s) orally once a day  tamsulosin 0.4 mg oral capsule: 1 cap(s) orally once a day  Tylenol 325 mg oral tablet: 2 tab(s) orally 2 times a day  Vitamin D3 25 mcg (1000 intl units) oral tablet: 1 tab(s) orally once a day  Zocor 20 mg oral tablet: 1 tab(s) orally once a day (at bedtime)

## 2021-09-02 NOTE — CHART NOTE - NSCHARTNOTEFT_GEN_A_CORE
61 year old man with morbid obesity from [facility] [place] ambulates w/ walker has past medical hx of COPD not on O2 at home, hypothyroidism, anemia, left DVT, CKD, BPH, HLD, bipolar disorder, ambulatory dysfunction, lymphedema was BIBEMS as patient was complaining of worsening lower extremity weakness, lightheaded and overall not feeling well for one day, SHEYLA on CKD on labs. Off note, patient with slurred speech at baseline since childhood and known ambulatory dysfunction, that is the reason why he is at [facility].     BRIEF HOSPITAL COURSE:   On 8/31, pt was noted to be increasingly lethargic and drowsy and was hypotensive as well. Rapid response team was called. Patient was only opening eyes to painful stimuli. Pinpoint pupils were noted. Chart review showed he received Dilaudid 4mg PO in the afternoon, Narcan 0.4mg was given and pt opened eyes.   ABG shows PCO2 of 100 for which pt was placed on BiPAP in ICU and improved. Pt was able to drink water and take pills 9/1, hence diet advanced to soft. Latest ABG 7.22/62/83/25 on 4 liters NC, hence patient is being downgraded to AI service.     F/u [facility] for SHEYLA on CKD  Pt was signed to NP ****** 61 year old man with morbid obesity from [facility] [place] ambulates w/ walker has past medical hx of COPD not on O2 at home, hypothyroidism, anemia, left DVT, CKD, BPH, HLD, bipolar disorder, ambulatory dysfunction, lymphedema was BIBEMS as patient was complaining of worsening lower extremity weakness, lightheaded and overall not feeling well for one day, SHEYLA on CKD on labs. Off note, patient with slurred speech at baseline since childhood and known ambulatory dysfunction, that is the reason why he is at [facility].     BRIEF HOSPITAL COURSE:   On 8/31, pt was noted to be increasingly lethargic and drowsy and was hypotensive as well. Rapid response team was called. Patient was only opening eyes to painful stimuli. Pinpoint pupils were noted. Chart review showed he received Dilaudid 4mg PO in the afternoon, Narcan 0.4mg was given and pt opened eyes.   ABG shows PCO2 of 100 for which pt was placed on BiPAP in ICU and improved. IV Lasix 40 mg added for euvolemic status. Pt was able to drink water and take pills 9/1, hence diet advanced to soft. Latest ABG 7.22/62/83/25 on 4 liters NC, hence patient is being downgraded to AI service.     F/u [facility] for SHEYLA on CKD  Pt was signed to NP ****** 61 year old man with morbid obesity from [facility] [place] ambulates w/ walker has past medical hx of COPD not on O2 at home, hypothyroidism, anemia, left DVT, CKD, BPH, HLD, bipolar disorder, ambulatory dysfunction, lymphedema was BIBEMS as patient was complaining of worsening lower extremity weakness, lightheaded and overall not feeling well for one day, SHEYLA on CKD on labs. Off note, patient with slurred speech at baseline since childhood and known ambulatory dysfunction, that is the reason why he is at [facility].     BRIEF HOSPITAL COURSE:   On 8/31, pt was noted to be increasingly lethargic and drowsy and was hypotensive as well. Rapid response team was called. Patient was only opening eyes to painful stimuli. Pinpoint pupils were noted. Chart review showed he received Dilaudid 4mg PO in the afternoon, Narcan 0.4mg was given and pt opened eyes.   ABG shows PCO2 of 100 for which pt was placed on BiPAP in ICU and improved. IV Lasix 40 mg added for euvolemic status. Pt was able to drink water and take pills 9/1, hence diet advanced to soft. Latest ABG 7.22/62/83/25 on 4 liters NC, hence patient is being downgraded to AI service.     F/u [facility] for SHEYLA on CKD  Pt was signed to GARY Selby

## 2021-09-02 NOTE — PROGRESS NOTE ADULT - SUBJECTIVE AND OBJECTIVE BOX
EP COVERING C A R D I O L O G Y  *********************    DATE OF SERVICE: 9-2-21    HISTORY OF PRESENT ILLNESS: HPI:  61 year old man with morbid obesity from [facility] [place] amb[place] w/ walker has past medical hx of COPD not on O2 at home, hypothyroidism, anemia, left DVT, CKD, BPH, HLD, bipolar disorder, ambulatory dysfunction, lymphedema was BIBEMS as patient was complaining of worsening lower extremity weakness, lightheaded and overall not feeling well for one day. Of note, patient with slurred speech at baseline since childhood and known ambulatory dysfunction reason why he is on IRA. Patient denies chest pain, palpitations, sob or other symptoms.  No CP, No LOC    Transferred out of ICU to floor.  Hypercarbic respiratory failure due to opiate overuse has resolved.  Feeling better.  Wants to go back to residential.    acetaminophen   Tablet .. 1000 milliGRAM(s) Oral every 8 hours PRN  ALBUTerol    90 MICROgram(s) HFA Inhaler 2 Puff(s) Inhalation <User Schedule>  aspirin enteric coated 81 milliGRAM(s) Oral daily  budesonide 160 MICROgram(s)/formoterol 4.5 MICROgram(s) Inhaler 2 Puff(s) Inhalation two times a day  buPROPion XL (24-Hour) . 150 milliGRAM(s) Oral daily  busPIRone 10 milliGRAM(s) Oral two times a day  calcitriol   Capsule 0.25 MICROGram(s) Oral daily  cholecalciferol 1000 Unit(s) Oral daily  diVALproex  milliGRAM(s) Oral <User Schedule>  ferrous    sulfate 325 milliGRAM(s) Oral daily  finasteride 5 milliGRAM(s) Oral daily  folic acid 1 milliGRAM(s) Oral daily  furosemide   Injectable 40 milliGRAM(s) IV Push daily  heparin   Injectable 5000 Unit(s) SubCutaneous every 8 hours  levothyroxine 125 MICROGram(s) Oral daily  lidocaine   4% Patch 1 Patch Transdermal daily  nicotine -  14 mG/24Hr(s) Patch 1 patch Transdermal daily  pantoprazole    Tablet 40 milliGRAM(s) Oral before breakfast  polyethylene glycol 3350 17 Gram(s) Oral daily  risperiDONE   Tablet 2 milliGRAM(s) Oral <User Schedule>  senna 2 Tablet(s) Oral at bedtime  sevelamer carbonate 800 milliGRAM(s) Oral three times a day with meals  simvastatin 20 milliGRAM(s) Oral at bedtime  tamsulosin 0.4 milliGRAM(s) Oral at bedtime                            10.1   6.62  )-----------( 206      ( 02 Sep 2021 11:04 )             32.4       09-02    142  |  110<H>  |  55<H>  ----------------------------<  107<H>  5.0   |  29  |  3.62<H>    Ca    8.5      02 Sep 2021 11:04  Phos  4.2     09-02  Mg     2.5     09-02    TPro  7.2  /  Alb  2.4<L>  /  TBili  0.2  /  DBili  x   /  AST  11  /  ALT  12  /  AlkPhos  85  09-01      CARDIAC MARKERS ( 30 Aug 2021 18:43 )  0.179 ng/mL / x     / x     / x     / x          T(C): 37.2 (09-02-21 @ 12:13), Max: 37.2 (09-02-21 @ 12:13)  HR: 77 (09-02-21 @ 12:13) (71 - 92)  BP: 118/58 (09-02-21 @ 12:13) (73/38 - 150/66)  RR: 15 (09-02-21 @ 12:13) (13 - 24)  SpO2: 95% (09-02-21 @ 12:13) (92% - 100%)  Wt(kg): --    I&O's Summary    01 Sep 2021 07:01  -  02 Sep 2021 07:00  --------------------------------------------------------  IN: 240.4 mL / OUT: 3375 mL / NET: -3134.6 mL    02 Sep 2021 07:01  -  02 Sep 2021 13:19  --------------------------------------------------------  IN: 0 mL / OUT: 2125 mL / NET: -2125 mL    Gen: pleasant overweight white male in no acute distress, cooperative.  HEENT:  (-)icterus (-)pallor  CV: N S1 S2 1/6 ABIGAIL (+)2 Pulses B/l  Resp:  Clear to ausculatation B/L, normal effort  GI: (+) BS Soft, NT, ND  Lymph:  severe  edema, (-)obvious lymphadenopathy  Skin: Warm to touch, Normal turgor  Psych: Appropriate mood and affect    TELEMETRY: 	  Sinus    ECG:  	Sinus RBBB, LAFB, Bifascicular block     RADIOLOGY:         CXR:   < from: Xray Chest 1 View-PORTABLE IMMEDIATE (08.30.21 @ 10:03) >  No active pulmonary disease.    Thank you for the courtesy of this referral.    < end of copied text >    Echo:  LVEF is 60%, LA is dilated, mild PHTN, normal RV function.    ASSESSMENT/PLAN: 	61y Male morbid obesity from [facility] [place] ambulates w/ walker has past medical hx of COPD not on O2 at home, hypothyroidism, anemia, left DVT, CKD, BPH, HLD, bipolar disorder, ambulatory dysfunction, lymphedema was BIBEMS as patient was complaining of worsening lower extremity weakness and  lightheadedness    -  Recovering well after Narcan for opiate toxicity.  Cont tele  - Echo is overall reassuring.  -Work toward discharge back to assisted living.    Munir Bass M.D.  Cardiac Electrophysiology  [phone]

## 2021-09-02 NOTE — DISCHARGE NOTE PROVIDER - NSDCCPCAREPLAN_GEN_ALL_CORE_FT
PRINCIPAL DISCHARGE DIAGNOSIS  Diagnosis: Acute respiratory failure with hypercapnia  Assessment and Plan of Treatment: Continue to BIPAP nightly Settings 16/8 FIO2 35%. Oxygen as needed during the day. Continue bronchodilators.      SECONDARY DISCHARGE DIAGNOSES  Diagnosis: COPD (chronic obstructive pulmonary disease)  Assessment and Plan of Treatment: Continue bronchodilators and ICS.  Use BIPAP nightly as prescribed    Diagnosis: Acute kidney injury superimposed on CKD  Assessment and Plan of Treatment: Stage 4 CKD. Continue to follow with Renal once discharged.  Continue taking medications as prescribed. Baseline creatinine 3.1.  CMP every 3 weeks    Diagnosis: Chronic venous stasis  Assessment and Plan of Treatment: Continue daily lasix. Elevate legs when sitting.    Diagnosis: Anemia  Assessment and Plan of Treatment: Continue iron supplementation.    Diagnosis: Bipolar disorder  Assessment and Plan of Treatment: Continue medications as prescribed.    Diagnosis: Hypothyroidism  Assessment and Plan of Treatment: Continue taking synthroid as prescribed. Have TSH monitored as an outpatient.    Diagnosis: BPH (benign prostatic hyperplasia)  Assessment and Plan of Treatment: Continue taking medications as prescribed.    Diagnosis: Demand ischemia of myocardium  Assessment and Plan of Treatment:     Diagnosis: SHEYLA (acute kidney injury)  Assessment and Plan of Treatment:

## 2021-09-02 NOTE — CHART NOTE - NSCHARTNOTEFT_GEN_A_CORE
Patient provided approval for emergency contact to receive update.  Called Bear, left message to call back for update.  Unit nurses station number provided for call back.

## 2021-09-02 NOTE — PROGRESS NOTE ADULT - ASSESSMENT
l Drug Utilization Report  Search Terms: bear abdul, 1960Search Date: 09/01/2021 17:47:17 PM  The Drug Utilization Report below displays all of the controlled substance prescriptions, if any, that your patient has filled in the last twelve months. The information displayed on this report is compiled from pharmacy submissions to the Department, and accurately reflects the information as submitted by the pharmacies.    This report was requested by: Eula Olivier | Reference #: [id number]    Others' Prescriptions  Patient Name: Bear AbdulBirth Date: 1960  Address: [address] [place], [place] [address]Xet: Male  Rx Written	Rx Dispensed	Drug	Quantity	Days Supply	Prescriber Name	Prescriber Joycelyn #	Payment Method  08/27/2021	08/27/2021	hydromorphone 4 mg tablet	30	30	Owen Phillips	[id number]	[facility]  11/06/2020	11/07/2020	alprazolam 0.5 mg tablet	5	5	Owen Phillips	[id number]	Medicaid  [facility]

## 2021-09-03 LAB
ALBUMIN SERPL ELPH-MCNC: 2.5 G/DL — LOW (ref 3.5–5)
ALP SERPL-CCNC: 76 U/L — SIGNIFICANT CHANGE UP (ref 40–120)
ALT FLD-CCNC: 12 U/L DA — SIGNIFICANT CHANGE UP (ref 10–60)
ANION GAP SERPL CALC-SCNC: 4 MMOL/L — LOW (ref 5–17)
AST SERPL-CCNC: 10 U/L — SIGNIFICANT CHANGE UP (ref 10–40)
BILIRUB SERPL-MCNC: 0.3 MG/DL — SIGNIFICANT CHANGE UP (ref 0.2–1.2)
BUN SERPL-MCNC: 60 MG/DL — HIGH (ref 7–18)
CALCIUM SERPL-MCNC: 9 MG/DL — SIGNIFICANT CHANGE UP (ref 8.4–10.5)
CHLORIDE SERPL-SCNC: 111 MMOL/L — HIGH (ref 96–108)
CO2 SERPL-SCNC: 30 MMOL/L — SIGNIFICANT CHANGE UP (ref 22–31)
CREAT SERPL-MCNC: 3.65 MG/DL — HIGH (ref 0.5–1.3)
GLUCOSE SERPL-MCNC: 90 MG/DL — SIGNIFICANT CHANGE UP (ref 70–99)
HCT VFR BLD CALC: 34.3 % — LOW (ref 39–50)
HGB BLD-MCNC: 10.9 G/DL — LOW (ref 13–17)
MAGNESIUM SERPL-MCNC: 2.5 MG/DL — SIGNIFICANT CHANGE UP (ref 1.6–2.6)
MCHC RBC-ENTMCNC: 31.8 GM/DL — LOW (ref 32–36)
MCHC RBC-ENTMCNC: 32.1 PG — SIGNIFICANT CHANGE UP (ref 27–34)
MCV RBC AUTO: 100.9 FL — HIGH (ref 80–100)
MRSA PCR RESULT.: DETECTED
NRBC # BLD: 0 /100 WBCS — SIGNIFICANT CHANGE UP (ref 0–0)
PHOSPHATE SERPL-MCNC: 4.2 MG/DL — SIGNIFICANT CHANGE UP (ref 2.5–4.5)
PLATELET # BLD AUTO: 202 K/UL — SIGNIFICANT CHANGE UP (ref 150–400)
POTASSIUM SERPL-MCNC: 4.2 MMOL/L — SIGNIFICANT CHANGE UP (ref 3.5–5.3)
POTASSIUM SERPL-SCNC: 4.2 MMOL/L — SIGNIFICANT CHANGE UP (ref 3.5–5.3)
PROT SERPL-MCNC: 6.9 G/DL — SIGNIFICANT CHANGE UP (ref 6–8.3)
RBC # BLD: 3.4 M/UL — LOW (ref 4.2–5.8)
RBC # FLD: 13.8 % — SIGNIFICANT CHANGE UP (ref 10.3–14.5)
S AUREUS DNA NOSE QL NAA+PROBE: DETECTED
SODIUM SERPL-SCNC: 145 MMOL/L — SIGNIFICANT CHANGE UP (ref 135–145)
WBC # BLD: 7.19 K/UL — SIGNIFICANT CHANGE UP (ref 3.8–10.5)
WBC # FLD AUTO: 7.19 K/UL — SIGNIFICANT CHANGE UP (ref 3.8–10.5)

## 2021-09-03 RX ORDER — MUPIROCIN 20 MG/G
1 OINTMENT TOPICAL EVERY 12 HOURS
Refills: 0 | Status: DISCONTINUED | OUTPATIENT
Start: 2021-09-03 | End: 2021-09-03

## 2021-09-03 RX ORDER — MUPIROCIN 20 MG/G
1 OINTMENT TOPICAL EVERY 12 HOURS
Refills: 0 | Status: COMPLETED | OUTPATIENT
Start: 2021-09-03 | End: 2021-09-08

## 2021-09-03 RX ADMIN — BUDESONIDE AND FORMOTEROL FUMARATE DIHYDRATE 2 PUFF(S): 160; 4.5 AEROSOL RESPIRATORY (INHALATION) at 09:07

## 2021-09-03 RX ADMIN — Medication 81 MILLIGRAM(S): at 13:30

## 2021-09-03 RX ADMIN — CALCITRIOL 0.25 MICROGRAM(S): 0.5 CAPSULE ORAL at 13:31

## 2021-09-03 RX ADMIN — Medication 10 MILLIGRAM(S): at 17:23

## 2021-09-03 RX ADMIN — RISPERIDONE 2 MILLIGRAM(S): 4 TABLET ORAL at 21:29

## 2021-09-03 RX ADMIN — DIVALPROEX SODIUM 500 MILLIGRAM(S): 500 TABLET, DELAYED RELEASE ORAL at 09:07

## 2021-09-03 RX ADMIN — LIDOCAINE 1 PATCH: 4 CREAM TOPICAL at 19:54

## 2021-09-03 RX ADMIN — SENNA PLUS 2 TABLET(S): 8.6 TABLET ORAL at 21:30

## 2021-09-03 RX ADMIN — SEVELAMER CARBONATE 800 MILLIGRAM(S): 2400 POWDER, FOR SUSPENSION ORAL at 09:07

## 2021-09-03 RX ADMIN — HEPARIN SODIUM 5000 UNIT(S): 5000 INJECTION INTRAVENOUS; SUBCUTANEOUS at 21:29

## 2021-09-03 RX ADMIN — PANTOPRAZOLE SODIUM 40 MILLIGRAM(S): 20 TABLET, DELAYED RELEASE ORAL at 05:31

## 2021-09-03 RX ADMIN — HEPARIN SODIUM 5000 UNIT(S): 5000 INJECTION INTRAVENOUS; SUBCUTANEOUS at 13:30

## 2021-09-03 RX ADMIN — Medication 125 MICROGRAM(S): at 05:31

## 2021-09-03 RX ADMIN — SEVELAMER CARBONATE 800 MILLIGRAM(S): 2400 POWDER, FOR SUSPENSION ORAL at 17:23

## 2021-09-03 RX ADMIN — ALBUTEROL 2 PUFF(S): 90 AEROSOL, METERED ORAL at 17:24

## 2021-09-03 RX ADMIN — Medication 1 PATCH: at 13:37

## 2021-09-03 RX ADMIN — SIMVASTATIN 20 MILLIGRAM(S): 20 TABLET, FILM COATED ORAL at 21:29

## 2021-09-03 RX ADMIN — HEPARIN SODIUM 5000 UNIT(S): 5000 INJECTION INTRAVENOUS; SUBCUTANEOUS at 05:31

## 2021-09-03 RX ADMIN — Medication 1000 UNIT(S): at 13:35

## 2021-09-03 RX ADMIN — LIDOCAINE 1 PATCH: 4 CREAM TOPICAL at 13:35

## 2021-09-03 RX ADMIN — TAMSULOSIN HYDROCHLORIDE 0.4 MILLIGRAM(S): 0.4 CAPSULE ORAL at 21:29

## 2021-09-03 RX ADMIN — Medication 325 MILLIGRAM(S): at 13:31

## 2021-09-03 RX ADMIN — BUDESONIDE AND FORMOTEROL FUMARATE DIHYDRATE 2 PUFF(S): 160; 4.5 AEROSOL RESPIRATORY (INHALATION) at 21:30

## 2021-09-03 RX ADMIN — BUPROPION HYDROCHLORIDE 150 MILLIGRAM(S): 150 TABLET, EXTENDED RELEASE ORAL at 13:30

## 2021-09-03 RX ADMIN — DIVALPROEX SODIUM 500 MILLIGRAM(S): 500 TABLET, DELAYED RELEASE ORAL at 17:23

## 2021-09-03 RX ADMIN — ALBUTEROL 2 PUFF(S): 90 AEROSOL, METERED ORAL at 13:29

## 2021-09-03 RX ADMIN — POLYETHYLENE GLYCOL 3350 17 GRAM(S): 17 POWDER, FOR SOLUTION ORAL at 13:37

## 2021-09-03 RX ADMIN — Medication 40 MILLIGRAM(S): at 05:31

## 2021-09-03 RX ADMIN — Medication 10 MILLIGRAM(S): at 05:31

## 2021-09-03 RX ADMIN — FINASTERIDE 5 MILLIGRAM(S): 5 TABLET, FILM COATED ORAL at 13:31

## 2021-09-03 RX ADMIN — Medication 1 MILLIGRAM(S): at 13:31

## 2021-09-03 RX ADMIN — ALBUTEROL 2 PUFF(S): 90 AEROSOL, METERED ORAL at 09:07

## 2021-09-03 RX ADMIN — SEVELAMER CARBONATE 800 MILLIGRAM(S): 2400 POWDER, FOR SUSPENSION ORAL at 13:30

## 2021-09-03 RX ADMIN — MUPIROCIN 1 APPLICATION(S): 20 OINTMENT TOPICAL at 17:24

## 2021-09-03 NOTE — PROGRESS NOTE ADULT - ASSESSMENT
61 year old man with morbid obesity from [facility] [place] ambulates w/ walker has past medical hx of COPD not on O2 at home, hypothyroidism, anemia, left DVT, CKD, BPH, HLD, bipolar disorder, initially admitted for increased lethargy is admitted for hypercapnic respiratroy failure requiring BIPAP     9/2 - Failed TOV, daley reinserted  9/3 - Awtg PT recs for d/c

## 2021-09-03 NOTE — PHYSICAL THERAPY INITIAL EVALUATION ADULT - GENERAL OBSERVATIONS, REHAB EVAL
awake, alert, NAD; currently on O2@2L/min 18 awake, alert, NAD; currently on O2@2L/min; + indwelling urethral catheter; + peripheral IV access on right antecubital area

## 2021-09-03 NOTE — PHYSICAL THERAPY INITIAL EVALUATION ADULT - MANUAL MUSCLE TESTING RESULTS, REHAB EVAL
MMT on both upper extremities are grossly graded 4/5; left lower extremity grossly graded 4/5; right LE MMT grossly graded 3/5

## 2021-09-03 NOTE — PROGRESS NOTE ADULT - ASSESSMENT
1. SHEYLA due to r/o ATN due to possible CRS  -scr is unchanged and close to baseline and stable electrolytes; continue lasix 40mg iv daily. TOV done.  -UA shows some proteinuria. -resend urine lytes (uosm, urine sodium, urine creatinine, chloride, potassium), spot protein to creatinine ratio  - renal sonogram shows no hydronephrosis with Right kidney 11.8cm and Left kidney 12.0cm with increase echogenicity   -Adjust meds to eGFR and avoid IV Gadolinium contrast,NSAIDs, and phosphate enema.  -Monitor I/O's daily.   -Monitor SMA daily.  2. CKD stage 4 most likely due to ischemic nephropathy  -baseline scr around 3.1mg/dL  -Keep patient euvolemic and renal diet  -Avoid Nephrotoxic Meds/ Agents such as (NSAIDs, IV contrast, Aminoglycosides such as gentamicin, -Gadolinium contrast, Phosphate containing enemas, etc..)  -Adjust Medications according to eGFR  3. Anemia:  -on iron tabs.   -F/u CBC daily  -transfuse if HB < 7.0.  3. Mineral Bone Disease:  -phos is elevated and on renvela 1 tab tid and Vitamin 25 OH is low and PTH intact is 330, add calcitriol 0.25mcg daily.   4. Respiratory Acidosis: on bipap and f/u echo. on iv lasix. cardiology consulted.       Discussed with patient in detail regarding the renal plan and care  Discussed the assessment and plan with Primary Team/Nurse

## 2021-09-03 NOTE — PROGRESS NOTE ADULT - SUBJECTIVE AND OBJECTIVE BOX
`Patient is a 61y old  Male who presents with a chief complaint of SHEYLA ON CKD, DEMAND ISCHEMIA, AMBULATORY DYSFUNCTION (02 Sep 2021 16:03)    PATIENT IS SEEN AND EXAMINED IN MEDICAL FLOOR.  DORIST [    ]    ALYSIA [   ]      GT [   ]    ALLERGIES:  No Known Allergies      Daily     Daily     VITALS:    Vital Signs Last 24 Hrs  T(C): 37 (03 Sep 2021 04:49), Max: 37.4 (02 Sep 2021 20:37)  T(F): 98.6 (03 Sep 2021 04:49), Max: 99.4 (02 Sep 2021 20:37)  HR: 90 (03 Sep 2021 09:03) (77 - 90)  BP: 146/58 (03 Sep 2021 04:49) (106/50 - 146/58)  BP(mean): --  RR: 18 (03 Sep 2021 04:49) (15 - 19)  SpO2: 97% (03 Sep 2021 09:03) (95% - 97%)    LABS:    CBC Full  -  ( 03 Sep 2021 07:20 )  WBC Count : 7.19 K/uL  RBC Count : 3.40 M/uL  Hemoglobin : 10.9 g/dL  Hematocrit : 34.3 %  Platelet Count - Automated : 202 K/uL  Mean Cell Volume : 100.9 fl  Mean Cell Hemoglobin : 32.1 pg  Mean Cell Hemoglobin Concentration : 31.8 gm/dL  Auto Neutrophil # : x  Auto Lymphocyte # : x  Auto Monocyte # : x  Auto Eosinophil # : x  Auto Basophil # : x  Auto Neutrophil % : x  Auto Lymphocyte % : x  Auto Monocyte % : x  Auto Eosinophil % : x  Auto Basophil % : x      09-03    145  |  111<H>  |  60<H>  ----------------------------<  90  4.2   |  30  |  3.65<H>    Ca    9.0      03 Sep 2021 07:20  Phos  4.2     09-03  Mg     2.5     09-03    TPro  6.9  /  Alb  2.5<L>  /  TBili  0.3  /  DBili  x   /  AST  10  /  ALT  12  /  AlkPhos  76  09-03    CAPILLARY BLOOD GLUCOSE      POCT Blood Glucose.: 114 mg/dL (03 Sep 2021 06:01)  POCT Blood Glucose.: 111 mg/dL (02 Sep 2021 23:41)  POCT Blood Glucose.: 122 mg/dL (02 Sep 2021 16:58)  POCT Blood Glucose.: 123 mg/dL (02 Sep 2021 11:47)        LIVER FUNCTIONS - ( 03 Sep 2021 07:20 )  Alb: 2.5 g/dL / Pro: 6.9 g/dL / ALK PHOS: 76 U/L / ALT: 12 U/L DA / AST: 10 U/L / GGT: x           Creatinine Trend: 3.65<--, 3.62<--, 4.31<--, 4.01<--, 3.82<--, 3.86<--  I&O's Summary    02 Sep 2021 07:01  -  03 Sep 2021 07:00  --------------------------------------------------------  IN: 0 mL / OUT: 5625 mL / NET: -5625 mL    03 Sep 2021 07:01  -  03 Sep 2021 10:31  --------------------------------------------------------  IN: 0 mL / OUT: 1600 mL / NET: -1600 mL        ABG - ( 01 Sep 2021 18:19 )  pH, Arterial: 7.22  pH, Blood: x     /  pCO2: 62    /  pO2: 83    / HCO3: 25    / Base Excess: -3.5  /  SaO2: 97                  Clean Catch Clean Catch (Midstream)  08-30 @ 21:13   <10,000 CFU/mL Normal Urogenital Ivelisse  --  --          MEDICATIONS:    MEDICATIONS  (STANDING):  ALBUTerol    90 MICROgram(s) HFA Inhaler 2 Puff(s) Inhalation <User Schedule>  aspirin enteric coated 81 milliGRAM(s) Oral daily  budesonide 160 MICROgram(s)/formoterol 4.5 MICROgram(s) Inhaler 2 Puff(s) Inhalation two times a day  buPROPion XL (24-Hour) . 150 milliGRAM(s) Oral daily  busPIRone 10 milliGRAM(s) Oral two times a day  calcitriol   Capsule 0.25 MICROGram(s) Oral daily  cholecalciferol 1000 Unit(s) Oral daily  diVALproex  milliGRAM(s) Oral <User Schedule>  ferrous    sulfate 325 milliGRAM(s) Oral daily  finasteride 5 milliGRAM(s) Oral daily  folic acid 1 milliGRAM(s) Oral daily  furosemide   Injectable 40 milliGRAM(s) IV Push daily  heparin   Injectable 5000 Unit(s) SubCutaneous every 8 hours  levothyroxine 125 MICROGram(s) Oral daily  lidocaine   4% Patch 1 Patch Transdermal daily  nicotine -  14 mG/24Hr(s) Patch 1 patch Transdermal daily  pantoprazole    Tablet 40 milliGRAM(s) Oral before breakfast  polyethylene glycol 3350 17 Gram(s) Oral daily  risperiDONE   Tablet 2 milliGRAM(s) Oral <User Schedule>  senna 2 Tablet(s) Oral at bedtime  sevelamer carbonate 800 milliGRAM(s) Oral three times a day with meals  simvastatin 20 milliGRAM(s) Oral at bedtime  tamsulosin 0.4 milliGRAM(s) Oral at bedtime      MEDICATIONS  (PRN):  acetaminophen   Tablet .. 1000 milliGRAM(s) Oral every 8 hours PRN Moderate Pain (4 - 6)      REVIEW OF SYSTEMS:                           ALL ROS DONE [ X   ]    CONSTITUTIONAL:  LETHARGIC [   ], FEVER [   ], UNRESPONSIVE [   ]  CVS:  CP  [   ], SOB, [   ], PALPITATIONS [   ], DIZZYNESS [   ]  RS: COUGH [   ], SPUTUM [   ]  GI: ABDOMINAL PAIN [   ], NAUSEA [   ], VOMITINGS [   ], DIARRHEA [   ], CONSTIPATION [   ]  :  DYSURIA [   ], NOCTURIA [   ], INCREASED FREQUENCY [   ], DRIBLING [   ],  SKELETAL: PAINFUL JOINTS [   ], SWOLLEN JOINTS [   ], NECK ACHE [   ], LOW BACK ACHE [   ],  SKIN : ULCERS [   ], RASH [   ], ITCHING [   ]  CNS: HEAD ACHE [   ], DOUBLE VISION [   ], BLURRED VISION [   ], AMS / CONFUSION [   ], SEIZURES [   ], WEAKNESS [   ],TINGLING / NUMBNESS [   ]    PHYSICAL EXAMINATION:  GENERAL APPEARANCE: NO DISTRESS  HEENT:  NO PALLOR, NO  JVD,  NO   NODES, NECK SUPPLE  CVS: S1 +, S2 +,   RS: AEEB,  OCCASIONAL  RALES +,   NO RONCHI  ABD: SOFT, NT, NO, BS +  EXT: PE ++  SKIN: WARM,   SKELETAL:  ROM ACCEPTABLE  CNS:  AAO X 2     RADIOLOGY :    RADIOLOGY REVIEWED    ASSESSMENT :     Weakness    No pertinent past medical history    Hypothyroid    Hyperlipidemia    Schizophrenia    Schizoaffective disorder    Ambulatory dysfunction    Anemia    History of BPH    CKD (chronic kidney disease)    Chronic obstructive pulmonary disease (COPD)    Bipolar disorder    Bipolar disorder    No significant past surgical history        PLAN:  HPI:  61 year old man with morbid obesity from [facility] [place] ambulates w/ walker has past medical hx of COPD not on O2 at home, hypothyroidism, anemia, left DVT, CKD, BPH, HLD, bipolar disorder, ambulatory dysfunction, lymphedema was BIBEMS as patient was complaining of worsening lower extremity weakness, lightheaded and overall not feeling well for one day. Of note, patient with slurred speech at baseline since childhood and known ambulatory dysfunction reason why he is on IRA. Patient denies chest pain, palpitations, sob or other symptoms.      [facility] full code    (30 Aug 2021 17:02)    # RRT FOR AMS [8/31] FOUND TO HAVE ACUTE HYPERCAPNIA - GIVEN NARCAN [PATIENT'S RESPONSIVENESS IMPROVED], NARCOTICS DISCONTINUED, TRANSFERRED TO ICU ON BIPAP  - PAIN MGMT CONSULT  # HYPOGLYCEMIA S/T POOR PO INTAKE - MONITOR BG  # ELEVATED TROPONIN - TREND, ECHOCARDIOGRAM - W/ MILD PULM HTN, CARDIOLOGY CONSULT  # SHEYLA ON CKD - IMPROVING - S/P HATFIELD, MONITOR CR, AVOID NEPHROTOXIC AGENTS - ? ATN  - MONITOR IS AND OS  - HOLD DIURETICS  - NEPHROLOGY CONSULT  # COPD  # HYPOTHYROIDISM  # MORBID OBESITY  # CHRONIC VENOUS INSUFFICIENCY, HX OF LEFT DVT  # F/U PT EVAL  # GI AND DVT PPX     Patient is a 61y old  Male who presents with a chief complaint of SHEYLA ON CKD, DEMAND ISCHEMIA, AMBULATORY DYSFUNCTION (02 Sep 2021 16:03)    PATIENT IS SEEN AND EXAMINED IN MEDICAL FLOOR.    ALLERGIES:  No Known Allergies    VITALS:    Vital Signs Last 24 Hrs  T(C): 37 (03 Sep 2021 04:49), Max: 37.4 (02 Sep 2021 20:37)  T(F): 98.6 (03 Sep 2021 04:49), Max: 99.4 (02 Sep 2021 20:37)  HR: 90 (03 Sep 2021 09:03) (77 - 90)  BP: 146/58 (03 Sep 2021 04:49) (106/50 - 146/58)  BP(mean): --  RR: 18 (03 Sep 2021 04:49) (15 - 19)  SpO2: 97% (03 Sep 2021 09:03) (95% - 97%)    LABS:    CBC Full  -  ( 03 Sep 2021 07:20 )  WBC Count : 7.19 K/uL  RBC Count : 3.40 M/uL  Hemoglobin : 10.9 g/dL  Hematocrit : 34.3 %  Platelet Count - Automated : 202 K/uL  Mean Cell Volume : 100.9 fl  Mean Cell Hemoglobin : 32.1 pg  Mean Cell Hemoglobin Concentration : 31.8 gm/dL  Auto Neutrophil # : x  Auto Lymphocyte # : x  Auto Monocyte # : x  Auto Eosinophil # : x  Auto Basophil # : x  Auto Neutrophil % : x  Auto Lymphocyte % : x  Auto Monocyte % : x  Auto Eosinophil % : x  Auto Basophil % : x      09-03    145  |  111<H>  |  60<H>  ----------------------------<  90  4.2   |  30  |  3.65<H>    Ca    9.0      03 Sep 2021 07:20  Phos  4.2     09-03  Mg     2.5     09-03    TPro  6.9  /  Alb  2.5<L>  /  TBili  0.3  /  DBili  x   /  AST  10  /  ALT  12  /  AlkPhos  76  09-03    CAPILLARY BLOOD GLUCOSE      POCT Blood Glucose.: 114 mg/dL (03 Sep 2021 06:01)  POCT Blood Glucose.: 111 mg/dL (02 Sep 2021 23:41)  POCT Blood Glucose.: 122 mg/dL (02 Sep 2021 16:58)  POCT Blood Glucose.: 123 mg/dL (02 Sep 2021 11:47)        LIVER FUNCTIONS - ( 03 Sep 2021 07:20 )  Alb: 2.5 g/dL / Pro: 6.9 g/dL / ALK PHOS: 76 U/L / ALT: 12 U/L DA / AST: 10 U/L / GGT: x           Creatinine Trend: 3.65<--, 3.62<--, 4.31<--, 4.01<--, 3.82<--, 3.86<--  I&O's Summary    02 Sep 2021 07:01  -  03 Sep 2021 07:00  --------------------------------------------------------  IN: 0 mL / OUT: 5625 mL / NET: -5625 mL    03 Sep 2021 07:01  -  03 Sep 2021 10:31  --------------------------------------------------------  IN: 0 mL / OUT: 1600 mL / NET: -1600 mL        ABG - ( 01 Sep 2021 18:19 )  pH, Arterial: 7.22  pH, Blood: x     /  pCO2: 62    /  pO2: 83    / HCO3: 25    / Base Excess: -3.5  /  SaO2: 97                  Clean Catch Clean Catch (Midstream)  08-30 @ 21:13   <10,000 CFU/mL Normal Urogenital Ivelisse  --  --          MEDICATIONS:    MEDICATIONS  (STANDING):  ALBUTerol    90 MICROgram(s) HFA Inhaler 2 Puff(s) Inhalation <User Schedule>  aspirin enteric coated 81 milliGRAM(s) Oral daily  budesonide 160 MICROgram(s)/formoterol 4.5 MICROgram(s) Inhaler 2 Puff(s) Inhalation two times a day  buPROPion XL (24-Hour) . 150 milliGRAM(s) Oral daily  busPIRone 10 milliGRAM(s) Oral two times a day  calcitriol   Capsule 0.25 MICROGram(s) Oral daily  cholecalciferol 1000 Unit(s) Oral daily  diVALproex  milliGRAM(s) Oral <User Schedule>  ferrous    sulfate 325 milliGRAM(s) Oral daily  finasteride 5 milliGRAM(s) Oral daily  folic acid 1 milliGRAM(s) Oral daily  furosemide   Injectable 40 milliGRAM(s) IV Push daily  heparin   Injectable 5000 Unit(s) SubCutaneous every 8 hours  levothyroxine 125 MICROGram(s) Oral daily  lidocaine   4% Patch 1 Patch Transdermal daily  nicotine -  14 mG/24Hr(s) Patch 1 patch Transdermal daily  pantoprazole    Tablet 40 milliGRAM(s) Oral before breakfast  polyethylene glycol 3350 17 Gram(s) Oral daily  risperiDONE   Tablet 2 milliGRAM(s) Oral <User Schedule>  senna 2 Tablet(s) Oral at bedtime  sevelamer carbonate 800 milliGRAM(s) Oral three times a day with meals  simvastatin 20 milliGRAM(s) Oral at bedtime  tamsulosin 0.4 milliGRAM(s) Oral at bedtime      MEDICATIONS  (PRN):  acetaminophen   Tablet .. 1000 milliGRAM(s) Oral every 8 hours PRN Moderate Pain (4 - 6)      REVIEW OF SYSTEMS:                           ALL ROS DONE [ X   ]    CONSTITUTIONAL:  LETHARGIC [   ], FEVER [   ], UNRESPONSIVE [   ]  CVS:  CP  [   ], SOB, [   ], PALPITATIONS [   ], DIZZYNESS [   ]  RS: COUGH [   ], SPUTUM [   ]  GI: ABDOMINAL PAIN [   ], NAUSEA [   ], VOMITINGS [   ], DIARRHEA [   ], CONSTIPATION [   ]  :  DYSURIA [   ], NOCTURIA [   ], INCREASED FREQUENCY [   ], DRIBLING [   ],  SKELETAL: PAINFUL JOINTS [   ], SWOLLEN JOINTS [   ], NECK ACHE [   ], LOW BACK ACHE [   ],  SKIN : ULCERS [   ], RASH [   ], ITCHING [   ]  CNS: HEAD ACHE [   ], DOUBLE VISION [   ], BLURRED VISION [   ], AMS / CONFUSION [   ], SEIZURES [   ], WEAKNESS [   ],TINGLING / NUMBNESS [   ]    PHYSICAL EXAMINATION:  GENERAL APPEARANCE: NO DISTRESS  HEENT:  NO PALLOR, NO  JVD,  NO   NODES, NECK SUPPLE  CVS: S1 +, S2 +,   RS: AEEB,  OCCASIONAL  RALES +,   NO RONCHI  ABD: SOFT, NT, NO, BS +  EXT: PE ++  SKIN: WARM,   SKELETAL:  ROM ACCEPTABLE  CNS:  AAO X 2     RADIOLOGY :    RADIOLOGY REVIEWED    ASSESSMENT :     Weakness    No pertinent past medical history    Hypothyroid    Hyperlipidemia    Schizophrenia    Schizoaffective disorder    Ambulatory dysfunction    Anemia    History of BPH    CKD (chronic kidney disease)    Chronic obstructive pulmonary disease (COPD)    Bipolar disorder    Bipolar disorder    No significant past surgical history        PLAN:  HPI:  61 year old man with morbid obesity from [facility] [place] ambulates w/ walker has past medical hx of COPD not on O2 at home, hypothyroidism, anemia, left DVT, CKD, BPH, HLD, bipolar disorder, ambulatory dysfunction, lymphedema was BIBEMS as patient was complaining of worsening lower extremity weakness, lightheaded and overall not feeling well for one day. Of note, patient with slurred speech at baseline since childhood and known ambulatory dysfunction reason why he is on IRA. Patient denies chest pain, palpitations, sob or other symptoms.      [facility] full code    (30 Aug 2021 17:02)    # PT EVAL RECOMMENDED TG - D/C PLAN TO [facility]    # RRT FOR AMS [8/31] FOUND TO HAVE ACUTE HYPERCAPNIA - GIVEN NARCAN [PATIENT'S RESPONSIVENESS IMPROVED], NARCOTICS DISCONTINUED, TRANSFERRED TO ICU ON BIPAP  - PAIN MGMT CONSULT  # HYPOGLYCEMIA S/T POOR PO INTAKE - MONITOR BG  # ELEVATED TROPONIN - TREND, ECHOCARDIOGRAM - W/ MILD PULM HTN, CARDIOLOGY CONSULT  # SHEYLA ON CKD - IMPROVING - FAILED TOV W/ HATFIELD - MONITOR CR, AVOID NEPHROTOXIC AGENTS - ? ATN  - MONITOR IS AND OS  - HOLD DIURETICS  - NEPHROLOGY CONSULT  # COPD  # HYPOTHYROIDISM  # MORBID OBESITY  # CHRONIC VENOUS INSUFFICIENCY, HX OF LEFT DVT  # F/U PT EVAL  # GI AND DVT PPX

## 2021-09-03 NOTE — PROGRESS NOTE ADULT - SUBJECTIVE AND OBJECTIVE BOX
NEPHROLOGY MEDICAL CARE, [facility] - Dr. Taj Valenzuela/ Dr. Hazel Solis/ Dr. Cosmo Root/ Dr. Fang Gifford    Patient was seen and examined at bedside.    CC: patient is okay and has good appetite    Vital Signs Last 24 Hrs  T(C): 36.3 (03 Sep 2021 11:45), Max: 37.4 (02 Sep 2021 20:37)  T(F): 97.3 (03 Sep 2021 11:45), Max: 99.4 (02 Sep 2021 20:37)  HR: 84 (03 Sep 2021 13:27) (77 - 90)  BP: 109/61 (03 Sep 2021 13:27) (106/50 - 146/58)  BP(mean): 67 (03 Sep 2021 13:27) (67 - 88)  RR: 18 (03 Sep 2021 13:27) (16 - 19)  SpO2: 95% (03 Sep 2021 13:27) (92% - 97%)    09-02 @ 07:01 - 09-03 @ 07:00  --------------------------------------------------------  IN: 0 mL / OUT: 5625 mL / NET: -5625 mL    09-03 @ 07:01  -  09-03 @ 15:47  --------------------------------------------------------  IN: 0 mL / OUT: 2950 mL / NET: -2950 mL        PHYSICAL EXAM:  General: No acute respiratory distress; obese  Eyes: conjunctiva and sclera clear  ENMT: Atraumatic, Normocephalic, supple, No JVD present. Moist mucous membranes  Respiratory: b/l poor air entry; No rales, rhonchi, wheezing  Cardiovasular: S1S2+; no m/r/g  Gastrointestinal: Soft, Non-tender, Nondistended; Bowel sounds present  Neuro:  Awake, Alert & Oriented X3  Ext:  2+ pedal edema with chronic venous stasis; No Cyanosis  Skin: chronic vascular changes of the b/l legs.      MEDICATIONS:  MEDICATIONS  (STANDING):  ALBUTerol    90 MICROgram(s) HFA Inhaler 2 Puff(s) Inhalation <User Schedule>  aspirin enteric coated 81 milliGRAM(s) Oral daily  budesonide 160 MICROgram(s)/formoterol 4.5 MICROgram(s) Inhaler 2 Puff(s) Inhalation two times a day  buPROPion XL (24-Hour) . 150 milliGRAM(s) Oral daily  busPIRone 10 milliGRAM(s) Oral two times a day  calcitriol   Capsule 0.25 MICROGram(s) Oral daily  cholecalciferol 1000 Unit(s) Oral daily  diVALproex  milliGRAM(s) Oral <User Schedule>  ferrous    sulfate 325 milliGRAM(s) Oral daily  finasteride 5 milliGRAM(s) Oral daily  folic acid 1 milliGRAM(s) Oral daily  furosemide   Injectable 40 milliGRAM(s) IV Push daily  heparin   Injectable 5000 Unit(s) SubCutaneous every 8 hours  levothyroxine 125 MICROGram(s) Oral daily  lidocaine   4% Patch 1 Patch Transdermal daily  mupirocin 2% Ointment 1 Application(s) Both Nostrils every 12 hours  nicotine -  14 mG/24Hr(s) Patch 1 patch Transdermal daily  pantoprazole    Tablet 40 milliGRAM(s) Oral before breakfast  polyethylene glycol 3350 17 Gram(s) Oral daily  risperiDONE   Tablet 2 milliGRAM(s) Oral <User Schedule>  senna 2 Tablet(s) Oral at bedtime  sevelamer carbonate 800 milliGRAM(s) Oral three times a day with meals  simvastatin 20 milliGRAM(s) Oral at bedtime  tamsulosin 0.4 milliGRAM(s) Oral at bedtime    MEDICATIONS  (PRN):  acetaminophen   Tablet .. 1000 milliGRAM(s) Oral every 8 hours PRN Moderate Pain (4 - 6)          LABS:                        10.9   7.19  )-----------( 202      ( 03 Sep 2021 07:20 )             34.3     09-03    145  |  111<H>  |  60<H>  ----------------------------<  90  4.2   |  30  |  3.65<H>    Ca    9.0      03 Sep 2021 07:20  Phos  4.2     09-03  Mg     2.5     09-03    TPro  6.9  /  Alb  2.5<L>  /  TBili  0.3  /  DBili  x   /  AST  10  /  ALT  12  /  AlkPhos  76  09-03        Magnesium, Serum: 2.5 mg/dL (09-03 @ 07:20)  Phosphorus Level, Serum: 4.2 mg/dL (09-03 @ 07:20)    Urine studies    PTH and Vit D:

## 2021-09-03 NOTE — PHYSICAL THERAPY INITIAL EVALUATION ADULT - LIVES WITH, PROFILE
from [facility] Living [facility]

## 2021-09-03 NOTE — PROGRESS NOTE ADULT - SUBJECTIVE AND OBJECTIVE BOX
GAYLA PEARCE    SCU progress note    INTERVAL HPI/OVERNIGHT EVENTS: *** No new overnight events.  Seen and examined at bedside.     DNR [  ]   DNI  [  ]  FULL CODE [ x ]    Covid - 19 PCR:  Negative 8/30    The 4Ms    What Matters Most: see GO  Age appropriate Medications/Screen for High Risk Medication: Yes  Mentation: see CAM below  Mobility: defer to physical exam    The Confusion Assessment Method (CAM) Diagnostic Algorithm     1: Acute Onset or Fluctuating Course  - Is there evidence of an acute change in mental status from the patient’s baseline? Did the (abnormal) behavior  fluctuate during the day, that is, tend to come and go, or increase and decrease in severity?  [  ] YES [ x ] NO     2: Inattention  - Did the patient have difficulty focusing attention, being easily distractible, or having difficulty keeping track of what was being said?  [  ] YES [ x ] NO     3: Disorganized thinking  -Was the patient’s thinking disorganized or incoherent, such as rambling or irrelevant conversation, unclear or illogical flow of ideas, or unpredictable switching from subject to subject?  [  ] YES [ x ] NO    4: Altered Level of consciousness?  [  ] YES [ x ] NO    The diagnosis of delirium by CAM requires the presence of features 1 and 2 and either 3 or 4.    PRESSORS: [  ] YES [ x ] NO    Cardiovascular:  Heart Failure  Acute   Acute on Chronic  Chronic       furosemide   Injectable 40 milliGRAM(s) IV Push daily  tamsulosin 0.4 milliGRAM(s) Oral at bedtime    Pulmonary:  ALBUTerol    90 MICROgram(s) HFA Inhaler 2 Puff(s) Inhalation <User Schedule>  budesonide 160 MICROgram(s)/formoterol 4.5 MICROgram(s) Inhaler 2 Puff(s) Inhalation two times a day    Hematalogic:  aspirin enteric coated 81 milliGRAM(s) Oral daily  heparin   Injectable 5000 Unit(s) SubCutaneous every 8 hours    Other:  acetaminophen   Tablet .. 1000 milliGRAM(s) Oral every 8 hours PRN  buPROPion XL (24-Hour) . 150 milliGRAM(s) Oral daily  busPIRone 10 milliGRAM(s) Oral two times a day  calcitriol   Capsule 0.25 MICROGram(s) Oral daily  cholecalciferol 1000 Unit(s) Oral daily  diVALproex  milliGRAM(s) Oral <User Schedule>  ferrous    sulfate 325 milliGRAM(s) Oral daily  finasteride 5 milliGRAM(s) Oral daily  folic acid 1 milliGRAM(s) Oral daily  levothyroxine 125 MICROGram(s) Oral daily  lidocaine   4% Patch 1 Patch Transdermal daily  mupirocin 2% Nasal 1 Application(s) Nasal every 12 hours  nicotine -  14 mG/24Hr(s) Patch 1 patch Transdermal daily  pantoprazole    Tablet 40 milliGRAM(s) Oral before breakfast  polyethylene glycol 3350 17 Gram(s) Oral daily  risperiDONE   Tablet 2 milliGRAM(s) Oral <User Schedule>  senna 2 Tablet(s) Oral at bedtime  sevelamer carbonate 800 milliGRAM(s) Oral three times a day with meals  simvastatin 20 milliGRAM(s) Oral at bedtime    acetaminophen   Tablet .. 1000 milliGRAM(s) Oral every 8 hours PRN  ALBUTerol    90 MICROgram(s) HFA Inhaler 2 Puff(s) Inhalation <User Schedule>  aspirin enteric coated 81 milliGRAM(s) Oral daily  budesonide 160 MICROgram(s)/formoterol 4.5 MICROgram(s) Inhaler 2 Puff(s) Inhalation two times a day  buPROPion XL (24-Hour) . 150 milliGRAM(s) Oral daily  busPIRone 10 milliGRAM(s) Oral two times a day  calcitriol   Capsule 0.25 MICROGram(s) Oral daily  cholecalciferol 1000 Unit(s) Oral daily  diVALproex  milliGRAM(s) Oral <User Schedule>  ferrous    sulfate 325 milliGRAM(s) Oral daily  finasteride 5 milliGRAM(s) Oral daily  folic acid 1 milliGRAM(s) Oral daily  furosemide   Injectable 40 milliGRAM(s) IV Push daily  heparin   Injectable 5000 Unit(s) SubCutaneous every 8 hours  levothyroxine 125 MICROGram(s) Oral daily  lidocaine   4% Patch 1 Patch Transdermal daily  mupirocin 2% Nasal 1 Application(s) Nasal every 12 hours  nicotine -  14 mG/24Hr(s) Patch 1 patch Transdermal daily  pantoprazole    Tablet 40 milliGRAM(s) Oral before breakfast  polyethylene glycol 3350 17 Gram(s) Oral daily  risperiDONE   Tablet 2 milliGRAM(s) Oral <User Schedule>  senna 2 Tablet(s) Oral at bedtime  sevelamer carbonate 800 milliGRAM(s) Oral three times a day with meals  simvastatin 20 milliGRAM(s) Oral at bedtime  tamsulosin 0.4 milliGRAM(s) Oral at bedtime    Drug Dosing Weight  Height (cm): 172.7 (30 Aug 2021 08:52)  Weight (kg): 135.1 (31 Aug 2021 22:45)  BMI (kg/m2): 45.3 (31 Aug 2021 22:45)  BSA (m2): 2.42 (31 Aug 2021 22:45)    CENTRAL LINE: [  ] YES [ x ] NO  LOCATION:   DATE INSERTED:  REMOVE: [  ] YES [  ] NO  EXPLAIN:    HATFIELD: [ x ] YES [  ] NO    DATE INSERTED:  REMOVE:  [  ] YES [  ] NO  EXPLAIN:    PAST MEDICAL & SURGICAL HISTORY:  Hypothyroid    Hyperlipidemia    Ambulatory dysfunction    Anemia    History of BPH    CKD (chronic kidney disease)    Chronic obstructive pulmonary disease (COPD)    Bipolar disorder          ABG - ( 01 Sep 2021 18:19 )  pH, Arterial: 7.22  pH, Blood: x     /  pCO2: 62    /  pO2: 83    / HCO3: 25    / Base Excess: -3.5  /  SaO2: 97                    09-02 @ 07:01  -  09-03 @ 07:00  --------------------------------------------------------  IN: 0 mL / OUT: 5625 mL / NET: -5625 mL            PHYSICAL EXAM:    GENERAL: NAD, well-groomed, well-developed  HEAD:  Atraumatic, Normocephalic  EYES: PERRLA, conjunctiva and sclera clear  ENMT: Moist mucous membranes, No lesions  NECK: Supple, No JVD  NERVOUS SYSTEM:  Alert & Oriented X3  CHEST/LUNG: Clear to auscultation bilaterally  HEART: Regular rate and rhythm  ABDOMEN: Soft, Nontender, Nondistended; Bowel sounds present  EXTREMITIES:  + Peripheral Pulses  LYMPH: No lymphadenopathy noted  SKIN: Warm and dry; No rashes or lesions      LABS:  CBC Full  -  ( 03 Sep 2021 07:20 )  WBC Count : 7.19 K/uL  RBC Count : 3.40 M/uL  Hemoglobin : 10.9 g/dL  Hematocrit : 34.3 %  Platelet Count - Automated : 202 K/uL  Mean Cell Volume : 100.9 fl  Mean Cell Hemoglobin : 32.1 pg  Mean Cell Hemoglobin Concentration : 31.8 gm/dL  Auto Neutrophil # : x  Auto Lymphocyte # : x  Auto Monocyte # : x  Auto Eosinophil # : x  Auto Basophil # : x  Auto Neutrophil % : x  Auto Lymphocyte % : x  Auto Monocyte % : x  Auto Eosinophil % : x  Auto Basophil % : x    09-03    145  |  111<H>  |  60<H>  ----------------------------<  90  4.2   |  30  |  3.65<H>    Ca    9.0      03 Sep 2021 07:20  Phos  4.2     09-03  Mg     2.5     09-03    TPro  6.9  /  Alb  2.5<L>  /  TBili  0.3  /  DBili  x   /  AST  10  /  ALT  12  /  AlkPhos  76  09-03              [  ]  DVT Prophylaxis  [  ]  Nutrition, Brand, Rate (  Goal Rate)          Abnormal Nutritional Status -  Malnutrition   Cachexia      Morbid Obesity BMI >/=40    RADIOLOGY & ADDITIONAL STUDIES:  ***     < from: US Renal (09.02.21 @ 11:05) >  FINDINGS:  Technically limited exam.    Bilateral increased renal cortical echogenicity.    Right kidney: 11.8 cm. No hydronephrosis.    Left kidney: 12.0 cm. No hydronephrosis.    Urinary bladder: Collapsed.    IMPRESSION:    Technically limited exam.    Increased renal cortical echogenicity compatible with medical renal disease.    < end of copied text >    < from: Xray Chest 1 View-PORTABLE IMMEDIATE (08.30.21 @ 10:03) >  Rotated expiratory view of the chest shows the heart to benormal in size. There is mild elevation of the left hemidiaphragm. Old right rib fractures are present.    The lungs are clear and there is no evidence of pneumothorax nor pleural effusion.    IMPRESSION:  No active pulmonary disease.    < end of copied text >    Goals of Care Discussion with Family/Proxy/Other   - see note from/family meeting set up for...     GAYLA PEARCE    SCU progress note    INTERVAL HPI/OVERNIGHT EVENTS: *** No new overnight events.  Seen and examined at bedside.     DNR [  ]   DNI  [  ]  FULL CODE [ x ]    Covid - 19 PCR:  Negative 8/30    The 4Ms    What Matters Most: see GO  Age appropriate Medications/Screen for High Risk Medication: Yes  Mentation: see CAM below  Mobility: defer to physical exam    The Confusion Assessment Method (CAM) Diagnostic Algorithm     1: Acute Onset or Fluctuating Course  - Is there evidence of an acute change in mental status from the patient’s baseline? Did the (abnormal) behavior  fluctuate during the day, that is, tend to come and go, or increase and decrease in severity?  [  ] YES [ x ] NO     2: Inattention  - Did the patient have difficulty focusing attention, being easily distractible, or having difficulty keeping track of what was being said?  [  ] YES [ x ] NO     3: Disorganized thinking  -Was the patient’s thinking disorganized or incoherent, such as rambling or irrelevant conversation, unclear or illogical flow of ideas, or unpredictable switching from subject to subject?  [  ] YES [ x ] NO    4: Altered Level of consciousness?  [  ] YES [ x ] NO    The diagnosis of delirium by CAM requires the presence of features 1 and 2 and either 3 or 4.    PRESSORS: [  ] YES [ x ] NO    Cardiovascular:  Heart Failure  Acute   Acute on Chronic  Chronic       furosemide   Injectable 40 milliGRAM(s) IV Push daily  tamsulosin 0.4 milliGRAM(s) Oral at bedtime    Pulmonary:  ALBUTerol    90 MICROgram(s) HFA Inhaler 2 Puff(s) Inhalation <User Schedule>  budesonide 160 MICROgram(s)/formoterol 4.5 MICROgram(s) Inhaler 2 Puff(s) Inhalation two times a day    Hematalogic:  aspirin enteric coated 81 milliGRAM(s) Oral daily  heparin   Injectable 5000 Unit(s) SubCutaneous every 8 hours    Other:  acetaminophen   Tablet .. 1000 milliGRAM(s) Oral every 8 hours PRN  buPROPion XL (24-Hour) . 150 milliGRAM(s) Oral daily  busPIRone 10 milliGRAM(s) Oral two times a day  calcitriol   Capsule 0.25 MICROGram(s) Oral daily  cholecalciferol 1000 Unit(s) Oral daily  diVALproex  milliGRAM(s) Oral <User Schedule>  ferrous    sulfate 325 milliGRAM(s) Oral daily  finasteride 5 milliGRAM(s) Oral daily  folic acid 1 milliGRAM(s) Oral daily  levothyroxine 125 MICROGram(s) Oral daily  lidocaine   4% Patch 1 Patch Transdermal daily  mupirocin 2% Nasal 1 Application(s) Nasal every 12 hours  nicotine -  14 mG/24Hr(s) Patch 1 patch Transdermal daily  pantoprazole    Tablet 40 milliGRAM(s) Oral before breakfast  polyethylene glycol 3350 17 Gram(s) Oral daily  risperiDONE   Tablet 2 milliGRAM(s) Oral <User Schedule>  senna 2 Tablet(s) Oral at bedtime  sevelamer carbonate 800 milliGRAM(s) Oral three times a day with meals  simvastatin 20 milliGRAM(s) Oral at bedtime    acetaminophen   Tablet .. 1000 milliGRAM(s) Oral every 8 hours PRN  ALBUTerol    90 MICROgram(s) HFA Inhaler 2 Puff(s) Inhalation <User Schedule>  aspirin enteric coated 81 milliGRAM(s) Oral daily  budesonide 160 MICROgram(s)/formoterol 4.5 MICROgram(s) Inhaler 2 Puff(s) Inhalation two times a day  buPROPion XL (24-Hour) . 150 milliGRAM(s) Oral daily  busPIRone 10 milliGRAM(s) Oral two times a day  calcitriol   Capsule 0.25 MICROGram(s) Oral daily  cholecalciferol 1000 Unit(s) Oral daily  diVALproex  milliGRAM(s) Oral <User Schedule>  ferrous    sulfate 325 milliGRAM(s) Oral daily  finasteride 5 milliGRAM(s) Oral daily  folic acid 1 milliGRAM(s) Oral daily  furosemide   Injectable 40 milliGRAM(s) IV Push daily  heparin   Injectable 5000 Unit(s) SubCutaneous every 8 hours  levothyroxine 125 MICROGram(s) Oral daily  lidocaine   4% Patch 1 Patch Transdermal daily  mupirocin 2% Nasal 1 Application(s) Nasal every 12 hours  nicotine -  14 mG/24Hr(s) Patch 1 patch Transdermal daily  pantoprazole    Tablet 40 milliGRAM(s) Oral before breakfast  polyethylene glycol 3350 17 Gram(s) Oral daily  risperiDONE   Tablet 2 milliGRAM(s) Oral <User Schedule>  senna 2 Tablet(s) Oral at bedtime  sevelamer carbonate 800 milliGRAM(s) Oral three times a day with meals  simvastatin 20 milliGRAM(s) Oral at bedtime  tamsulosin 0.4 milliGRAM(s) Oral at bedtime    Drug Dosing Weight  Height (cm): 172.7 (30 Aug 2021 08:52)  Weight (kg): 135.1 (31 Aug 2021 22:45)  BMI (kg/m2): 45.3 (31 Aug 2021 22:45)  BSA (m2): 2.42 (31 Aug 2021 22:45)    CENTRAL LINE: [  ] YES [ x ] NO  LOCATION:   DATE INSERTED:  REMOVE: [  ] YES [  ] NO  EXPLAIN:    HATFIELD: [ x ] YES [  ] NO    DATE INSERTED:  9/2  REMOVE:  [  ] YES [ X ] NO  EXPLAIN:   Failed TOV    PAST MEDICAL & SURGICAL HISTORY:  Hypothyroid    Hyperlipidemia    Ambulatory dysfunction    Anemia    History of BPH    CKD (chronic kidney disease)    Chronic obstructive pulmonary disease (COPD)    Bipolar disorder          ABG - ( 01 Sep 2021 18:19 )  pH, Arterial: 7.22  pH, Blood: x     /  pCO2: 62    /  pO2: 83    / HCO3: 25    / Base Excess: -3.5  /  SaO2: 97                    09-02 @ 07:01  -  09-03 @ 07:00  --------------------------------------------------------  IN: 0 mL / OUT: 5625 mL / NET: -5625 mL            PHYSICAL EXAM:    GENERAL: NAD, well-groomed, well-developed  HEAD:  Atraumatic, Normocephalic  EYES: PERRLA, conjunctiva and sclera clear  ENMT: Moist mucous membranes, No lesions  NECK: Supple, No JVD  NERVOUS SYSTEM:  Alert & Oriented X3  CHEST/LUNG: Clear to auscultation bilaterally  HEART: Regular rate and rhythm  ABDOMEN: Soft, Nontender, Nondistended; Bowel sounds present  EXTREMITIES:  + Peripheral Pulses  LYMPH: No lymphadenopathy noted  SKIN: Warm and dry; No rashes or lesions      LABS:  CBC Full  -  ( 03 Sep 2021 07:20 )  WBC Count : 7.19 K/uL  RBC Count : 3.40 M/uL  Hemoglobin : 10.9 g/dL  Hematocrit : 34.3 %  Platelet Count - Automated : 202 K/uL  Mean Cell Volume : 100.9 fl  Mean Cell Hemoglobin : 32.1 pg  Mean Cell Hemoglobin Concentration : 31.8 gm/dL  Auto Neutrophil # : x  Auto Lymphocyte # : x  Auto Monocyte # : x  Auto Eosinophil # : x  Auto Basophil # : x  Auto Neutrophil % : x  Auto Lymphocyte % : x  Auto Monocyte % : x  Auto Eosinophil % : x  Auto Basophil % : x    09-03    145  |  111<H>  |  60<H>  ----------------------------<  90  4.2   |  30  |  3.65<H>    Ca    9.0      03 Sep 2021 07:20  Phos  4.2     09-03  Mg     2.5     09-03    TPro  6.9  /  Alb  2.5<L>  /  TBili  0.3  /  DBili  x   /  AST  10  /  ALT  12  /  AlkPhos  76  09-03              [  ]  DVT Prophylaxis  [  ]  Nutrition, Brand, Rate (  Goal Rate)          Abnormal Nutritional Status -  Malnutrition   Cachexia      Morbid Obesity BMI >/=40    RADIOLOGY & ADDITIONAL STUDIES:  ***     < from: US Renal (09.02.21 @ 11:05) >  FINDINGS:  Technically limited exam.    Bilateral increased renal cortical echogenicity.    Right kidney: 11.8 cm. No hydronephrosis.    Left kidney: 12.0 cm. No hydronephrosis.    Urinary bladder: Collapsed.    IMPRESSION:    Technically limited exam.    Increased renal cortical echogenicity compatible with medical renal disease.    < end of copied text >    < from: Xray Chest 1 View-PORTABLE IMMEDIATE (08.30.21 @ 10:03) >  Rotated expiratory view of the chest shows the heart to benormal in size. There is mild elevation of the left hemidiaphragm. Old right rib fractures are present.    The lungs are clear and there is no evidence of pneumothorax nor pleural effusion.    IMPRESSION:  No active pulmonary disease.    < end of copied text >    Goals of Care Discussion with Family/Proxy/Other   - see note from/family meeting set up for...

## 2021-09-03 NOTE — PROGRESS NOTE ADULT - ATTENDING COMMENTS
61 year old man with morbid obesity from [facility] [place] ambulates w/ walker has past medical hx of COPD not on O2 at home, hypothyroidism, anemia, left DVT, CKD, BPH, HLD, bipolar disorder, initially admitted for increased lethargy is admitted for hypercapnic respiratroy failure requiring BIPAP     9/2 - Failed TOV, daley reinserted  9/3 - Awtg PT recs for d/c     Problem/Plan - 1:  ·  Problem: Acute respiratory failure with hypercapnia.   ·  Plan: CXR noted above  SPO2 94% on RA  Pt takes Albuterol and Symbicort at home  Continue home regimen   Monitor oxygen saturations.     Problem/Plan - 2:  ·  Problem: Weakness.   ·  Plan: Likely secondary to hypoglycemia  PT consulted for evaluation and safe discharge recommendations. to facility

## 2021-09-03 NOTE — PHYSICAL THERAPY INITIAL EVALUATION ADULT - PATIENT/FAMILY/SIGNIFICANT OTHER GOALS STATEMENT, PT EVAL
return back to assisted living facility; ambulate, transfer, and perform ADLs independently while using a rollator

## 2021-09-03 NOTE — CHART NOTE - NSCHARTNOTEFT_GEN_A_CORE
Offered to provide pts brother with update.  Pt states he has spoken with his brother already today, he will be at the patients bedside later, there is no need for an update.

## 2021-09-03 NOTE — PROGRESS NOTE ADULT - PROBLEM SELECTOR PLAN 1
CXR noted above  SPO2 94% on RA  Pt takes Albuterol and Symbicort at home  Continue home regimen   Monitor oxygen saturations

## 2021-09-03 NOTE — PROGRESS NOTE ADULT - SUBJECTIVE AND OBJECTIVE BOX
C A R D I O L O G Y  *********************    DATE OF SERVICE: 9-3-21    HISTORY OF PRESENT ILLNESS: HPI:  61 year old man with morbid obesity from [facility] [place] amb[place] w/ walker has past medical hx of COPD not on O2 at home, hypothyroidism, anemia, left DVT, CKD, BPH, HLD, bipolar disorder, ambulatory dysfunction, lymphedema was BIBEMS as patient was complaining of worsening lower extremity weakness, lightheaded and overall not feeling well for one day. Of note, patient with slurred speech at baseline since childhood and known ambulatory dysfunction reason why he is on IRA. Patient denies chest pain, palpitations, sob or other symptoms.  No CP, No LOC    Transferred out of ICU to floor.  Hypercarbic respiratory failure due to opiate overuse has resolved.  Feeling better.  Wants to go back to IRA.    acetaminophen   Tablet .. 1000 milliGRAM(s) Oral every 8 hours PRN  ALBUTerol    90 MICROgram(s) HFA Inhaler 2 Puff(s) Inhalation <User Schedule>  aspirin enteric coated 81 milliGRAM(s) Oral daily  budesonide 160 MICROgram(s)/formoterol 4.5 MICROgram(s) Inhaler 2 Puff(s) Inhalation two times a day  buPROPion XL (24-Hour) . 150 milliGRAM(s) Oral daily  busPIRone 10 milliGRAM(s) Oral two times a day  calcitriol   Capsule 0.25 MICROGram(s) Oral daily  cholecalciferol 1000 Unit(s) Oral daily  diVALproex  milliGRAM(s) Oral <User Schedule>  ferrous    sulfate 325 milliGRAM(s) Oral daily  finasteride 5 milliGRAM(s) Oral daily  folic acid 1 milliGRAM(s) Oral daily  furosemide   Injectable 40 milliGRAM(s) IV Push daily  heparin   Injectable 5000 Unit(s) SubCutaneous every 8 hours  levothyroxine 125 MICROGram(s) Oral daily  lidocaine   4% Patch 1 Patch Transdermal daily  nicotine -  14 mG/24Hr(s) Patch 1 patch Transdermal daily  pantoprazole    Tablet 40 milliGRAM(s) Oral before breakfast  polyethylene glycol 3350 17 Gram(s) Oral daily  risperiDONE   Tablet 2 milliGRAM(s) Oral <User Schedule>  senna 2 Tablet(s) Oral at bedtime  sevelamer carbonate 800 milliGRAM(s) Oral three times a day with meals  simvastatin 20 milliGRAM(s) Oral at bedtime  tamsulosin 0.4 milliGRAM(s) Oral at bedtime                            10.1   6.62  )-----------( 206      ( 02 Sep 2021 11:04 )             32.4       09-02    142  |  110<H>  |  55<H>  ----------------------------<  107<H>  5.0   |  29  |  3.62<H>    Ca    8.5      02 Sep 2021 11:04  Phos  4.2     09-02  Mg     2.5     09-02    TPro  7.2  /  Alb  2.4<L>  /  TBili  0.2  /  DBili  x   /  AST  11  /  ALT  12  /  AlkPhos  85  09-01      CARDIAC MARKERS ( 30 Aug 2021 18:43 )  0.179 ng/mL / x     / x     / x     / x          T(C): 37.2 (09-02-21 @ 12:13), Max: 37.2 (09-02-21 @ 12:13)  HR: 77 (09-02-21 @ 12:13) (71 - 92)  BP: 118/58 (09-02-21 @ 12:13) (73/38 - 150/66)  RR: 15 (09-02-21 @ 12:13) (13 - 24)  SpO2: 95% (09-02-21 @ 12:13) (92% - 100%)  Wt(kg): --    I&O's Summary    01 Sep 2021 07:01  -  02 Sep 2021 07:00  --------------------------------------------------------  IN: 240.4 mL / OUT: 3375 mL / NET: -3134.6 mL    02 Sep 2021 07:01  -  02 Sep 2021 13:19  --------------------------------------------------------  IN: 0 mL / OUT: 2125 mL / NET: -2125 mL    Gen: pleasant overweight white male in no acute distress, cooperative.  HEENT:  (-)icterus (-)pallor  CV: N S1 S2 1/6 ABIGAIL (+)2 Pulses B/l  Resp:  Clear to ausculatation B/L, normal effort  GI: (+) BS Soft, NT, ND  Lymph:  severe  edema, (-)obvious lymphadenopathy  Skin: Warm to touch, Normal turgor  Psych: Appropriate mood and affect      ASSESSMENT/PLAN: 	61y Male morbid obesity from [facility] [place] ambulates w/ walker has past medical hx of COPD not on O2 at home, hypothyroidism, anemia, left DVT, CKD, BPH, HLD, bipolar disorder, ambulatory dysfunction, lymphedema was BIBEMS as patient was complaining of worsening lower extremity weakness and  lightheadedness    -  Recovering well after Narcan for opiate toxicity.  Cont tele  - Echo is overall reassuring.  - Work toward discharge back to assisted living.  - No angina or CHF  - Clinical Presentation is sequale of a primary pulmonary process    Austen Javier MD, [facility]  BEEPER [phone]

## 2021-09-04 LAB
ALBUMIN SERPL ELPH-MCNC: 2.5 G/DL — LOW (ref 3.5–5)
ALP SERPL-CCNC: 76 U/L — SIGNIFICANT CHANGE UP (ref 40–120)
ALT FLD-CCNC: 11 U/L DA — SIGNIFICANT CHANGE UP (ref 10–60)
ANION GAP SERPL CALC-SCNC: 6 MMOL/L — SIGNIFICANT CHANGE UP (ref 5–17)
AST SERPL-CCNC: 9 U/L — LOW (ref 10–40)
BILIRUB SERPL-MCNC: 0.3 MG/DL — SIGNIFICANT CHANGE UP (ref 0.2–1.2)
BUN SERPL-MCNC: 59 MG/DL — HIGH (ref 7–18)
CALCIUM SERPL-MCNC: 8.9 MG/DL — SIGNIFICANT CHANGE UP (ref 8.4–10.5)
CHLORIDE SERPL-SCNC: 106 MMOL/L — SIGNIFICANT CHANGE UP (ref 96–108)
CO2 SERPL-SCNC: 29 MMOL/L — SIGNIFICANT CHANGE UP (ref 22–31)
CREAT SERPL-MCNC: 3.38 MG/DL — HIGH (ref 0.5–1.3)
GLUCOSE SERPL-MCNC: 90 MG/DL — SIGNIFICANT CHANGE UP (ref 70–99)
HCT VFR BLD CALC: 34.4 % — LOW (ref 39–50)
HGB BLD-MCNC: 10.6 G/DL — LOW (ref 13–17)
MAGNESIUM SERPL-MCNC: 2.2 MG/DL — SIGNIFICANT CHANGE UP (ref 1.6–2.6)
MCHC RBC-ENTMCNC: 30.8 GM/DL — LOW (ref 32–36)
MCHC RBC-ENTMCNC: 31.2 PG — SIGNIFICANT CHANGE UP (ref 27–34)
MCV RBC AUTO: 101.2 FL — HIGH (ref 80–100)
NRBC # BLD: 0 /100 WBCS — SIGNIFICANT CHANGE UP (ref 0–0)
PHOSPHATE SERPL-MCNC: 4.2 MG/DL — SIGNIFICANT CHANGE UP (ref 2.5–4.5)
PLATELET # BLD AUTO: 214 K/UL — SIGNIFICANT CHANGE UP (ref 150–400)
POTASSIUM SERPL-MCNC: 4.3 MMOL/L — SIGNIFICANT CHANGE UP (ref 3.5–5.3)
POTASSIUM SERPL-SCNC: 4.3 MMOL/L — SIGNIFICANT CHANGE UP (ref 3.5–5.3)
PROT SERPL-MCNC: 7 G/DL — SIGNIFICANT CHANGE UP (ref 6–8.3)
RBC # BLD: 3.4 M/UL — LOW (ref 4.2–5.8)
RBC # FLD: 13.5 % — SIGNIFICANT CHANGE UP (ref 10.3–14.5)
SODIUM SERPL-SCNC: 141 MMOL/L — SIGNIFICANT CHANGE UP (ref 135–145)
WBC # BLD: 6.54 K/UL — SIGNIFICANT CHANGE UP (ref 3.8–10.5)
WBC # FLD AUTO: 6.54 K/UL — SIGNIFICANT CHANGE UP (ref 3.8–10.5)

## 2021-09-04 PROCEDURE — 99231 SBSQ HOSP IP/OBS SF/LOW 25: CPT

## 2021-09-04 RX ORDER — TAMSULOSIN HYDROCHLORIDE 0.4 MG/1
0.8 CAPSULE ORAL AT BEDTIME
Refills: 0 | Status: DISCONTINUED | OUTPATIENT
Start: 2021-09-04 | End: 2021-09-08

## 2021-09-04 RX ADMIN — LIDOCAINE 1 PATCH: 4 CREAM TOPICAL at 20:38

## 2021-09-04 RX ADMIN — PANTOPRAZOLE SODIUM 40 MILLIGRAM(S): 20 TABLET, DELAYED RELEASE ORAL at 06:17

## 2021-09-04 RX ADMIN — HEPARIN SODIUM 5000 UNIT(S): 5000 INJECTION INTRAVENOUS; SUBCUTANEOUS at 21:36

## 2021-09-04 RX ADMIN — LIDOCAINE 1 PATCH: 4 CREAM TOPICAL at 11:12

## 2021-09-04 RX ADMIN — Medication 125 MICROGRAM(S): at 06:18

## 2021-09-04 RX ADMIN — Medication 1 PATCH: at 13:50

## 2021-09-04 RX ADMIN — RISPERIDONE 2 MILLIGRAM(S): 4 TABLET ORAL at 21:36

## 2021-09-04 RX ADMIN — POLYETHYLENE GLYCOL 3350 17 GRAM(S): 17 POWDER, FOR SOLUTION ORAL at 11:11

## 2021-09-04 RX ADMIN — BUDESONIDE AND FORMOTEROL FUMARATE DIHYDRATE 2 PUFF(S): 160; 4.5 AEROSOL RESPIRATORY (INHALATION) at 21:36

## 2021-09-04 RX ADMIN — SENNA PLUS 2 TABLET(S): 8.6 TABLET ORAL at 21:36

## 2021-09-04 RX ADMIN — Medication 1 PATCH: at 20:38

## 2021-09-04 RX ADMIN — MUPIROCIN 1 APPLICATION(S): 20 OINTMENT TOPICAL at 06:58

## 2021-09-04 RX ADMIN — LIDOCAINE 1 PATCH: 4 CREAM TOPICAL at 01:24

## 2021-09-04 RX ADMIN — TAMSULOSIN HYDROCHLORIDE 0.8 MILLIGRAM(S): 0.4 CAPSULE ORAL at 21:36

## 2021-09-04 RX ADMIN — Medication 40 MILLIGRAM(S): at 06:17

## 2021-09-04 RX ADMIN — Medication 1000 UNIT(S): at 11:13

## 2021-09-04 RX ADMIN — SEVELAMER CARBONATE 800 MILLIGRAM(S): 2400 POWDER, FOR SUSPENSION ORAL at 10:03

## 2021-09-04 RX ADMIN — Medication 10 MILLIGRAM(S): at 06:18

## 2021-09-04 RX ADMIN — Medication 1 MILLIGRAM(S): at 11:13

## 2021-09-04 RX ADMIN — ALBUTEROL 2 PUFF(S): 90 AEROSOL, METERED ORAL at 10:03

## 2021-09-04 RX ADMIN — SIMVASTATIN 20 MILLIGRAM(S): 20 TABLET, FILM COATED ORAL at 21:36

## 2021-09-04 RX ADMIN — Medication 1 PATCH: at 11:11

## 2021-09-04 RX ADMIN — BUPROPION HYDROCHLORIDE 150 MILLIGRAM(S): 150 TABLET, EXTENDED RELEASE ORAL at 11:13

## 2021-09-04 RX ADMIN — SEVELAMER CARBONATE 800 MILLIGRAM(S): 2400 POWDER, FOR SUSPENSION ORAL at 13:02

## 2021-09-04 RX ADMIN — Medication 325 MILLIGRAM(S): at 11:13

## 2021-09-04 RX ADMIN — SEVELAMER CARBONATE 800 MILLIGRAM(S): 2400 POWDER, FOR SUSPENSION ORAL at 17:39

## 2021-09-04 RX ADMIN — Medication 10 MILLIGRAM(S): at 17:39

## 2021-09-04 RX ADMIN — MUPIROCIN 1 APPLICATION(S): 20 OINTMENT TOPICAL at 21:37

## 2021-09-04 RX ADMIN — LIDOCAINE 1 PATCH: 4 CREAM TOPICAL at 23:23

## 2021-09-04 RX ADMIN — ALBUTEROL 2 PUFF(S): 90 AEROSOL, METERED ORAL at 11:12

## 2021-09-04 RX ADMIN — HEPARIN SODIUM 5000 UNIT(S): 5000 INJECTION INTRAVENOUS; SUBCUTANEOUS at 13:03

## 2021-09-04 RX ADMIN — ALBUTEROL 2 PUFF(S): 90 AEROSOL, METERED ORAL at 17:38

## 2021-09-04 RX ADMIN — CALCITRIOL 0.25 MICROGRAM(S): 0.5 CAPSULE ORAL at 11:13

## 2021-09-04 RX ADMIN — DIVALPROEX SODIUM 500 MILLIGRAM(S): 500 TABLET, DELAYED RELEASE ORAL at 17:39

## 2021-09-04 RX ADMIN — HEPARIN SODIUM 5000 UNIT(S): 5000 INJECTION INTRAVENOUS; SUBCUTANEOUS at 06:18

## 2021-09-04 RX ADMIN — Medication 81 MILLIGRAM(S): at 11:12

## 2021-09-04 RX ADMIN — BUDESONIDE AND FORMOTEROL FUMARATE DIHYDRATE 2 PUFF(S): 160; 4.5 AEROSOL RESPIRATORY (INHALATION) at 10:04

## 2021-09-04 RX ADMIN — FINASTERIDE 5 MILLIGRAM(S): 5 TABLET, FILM COATED ORAL at 11:13

## 2021-09-04 RX ADMIN — DIVALPROEX SODIUM 500 MILLIGRAM(S): 500 TABLET, DELAYED RELEASE ORAL at 10:03

## 2021-09-04 NOTE — PROGRESS NOTE ADULT - PROBLEM SELECTOR PLAN 1
Chronic pain of left lower extremity.   ·  Recommendation: Pt with chronic le pain.  Pt recently prescribed hydromorphone 4mg po daily.  Regimen was resumed in house and pt was given a dose.  Pt became increasingly lethargic and given narcan.   Pt with copd and shruthi on ckd. Pt is also most likely opioid naive.  Hydromorphone with copd and ckd led to increased risk of resp depression.    nonopioid recc  - acetaminophen 1000mg po q 8 hours prn moderate pain  - no nsaids due to ckd  - lidocaine patches to LE  - will avoid gabapentin at this time as pt with shruthi on ckd  opioid recc   - none at this time.  pt has not been on opioids long term so no risk of withdrawal.   bowel regimen  - senna  Will refer to pain mgt as outpt.  [facility] [place] - [phone]

## 2021-09-04 NOTE — PROGRESS NOTE ADULT - ATTENDING COMMENTS
61 year old man with morbid obesity from [facility] [place] ambulates w/ walker has past medical hx of COPD not on O2 at home, hypothyroidism, anemia, left DVT, CKD, BPH, HLD, bipolar disorder, initially admitted for increased lethargy is admitted for hypercapnic respiratroy failure requiring BIPAP     9/2 - Failed TOV, daley reinserted  9/3 - Awtg PT recs for d/c  9-4 - PT rec TG, pending choices     Problem/Plan - 1:  ·  Problem: Acute respiratory failure with hypercapnia.   ·  Plan: CXR noted above  SPO2 93% on 2L via n/c  Pt takes Albuterol and Symbicort at home  Continue home regimen   Monitor oxygen saturations.     Problem/Plan - 2:  ·  Problem: Weakness.   ·  Plan: Likely secondary to hypoglycemia  PT recommends TG.

## 2021-09-04 NOTE — PROGRESS NOTE ADULT - SUBJECTIVE AND OBJECTIVE BOX
C A R D I O L O G Y  *********************    DATE OF SERVICE: 9-4-21    HISTORY OF PRESENT ILLNESS: HPI:  61 year old man with morbid obesity from [facility] [place] amb[place] w/ walker has past medical hx of COPD not on O2 at home, hypothyroidism, anemia, left DVT, CKD, BPH, HLD, bipolar disorder, ambulatory dysfunction, lymphedema was BIBEMS as patient was complaining of worsening lower extremity weakness, lightheaded and overall not feeling well for one day. Of note, patient with slurred speech at baseline since childhood and known ambulatory dysfunction reason why he is on IRA. Patient denies chest pain, palpitations, sob or other symptoms.  No CP, No LOC    Transferred out of ICU to floor.  Hypercarbic respiratory failure due to opiate overuse has resolved.  Awaiting rehab then back to assisted living.    acetaminophen   Tablet .. 1000 milliGRAM(s) Oral every 8 hours PRN  ALBUTerol    90 MICROgram(s) HFA Inhaler 2 Puff(s) Inhalation <User Schedule>  aspirin enteric coated 81 milliGRAM(s) Oral daily  budesonide 160 MICROgram(s)/formoterol 4.5 MICROgram(s) Inhaler 2 Puff(s) Inhalation two times a day  buPROPion XL (24-Hour) . 150 milliGRAM(s) Oral daily  busPIRone 10 milliGRAM(s) Oral two times a day  calcitriol   Capsule 0.25 MICROGram(s) Oral daily  cholecalciferol 1000 Unit(s) Oral daily  diVALproex  milliGRAM(s) Oral <User Schedule>  ferrous    sulfate 325 milliGRAM(s) Oral daily  finasteride 5 milliGRAM(s) Oral daily  folic acid 1 milliGRAM(s) Oral daily  furosemide   Injectable 40 milliGRAM(s) IV Push daily  heparin   Injectable 5000 Unit(s) SubCutaneous every 8 hours  levothyroxine 125 MICROGram(s) Oral daily  lidocaine   4% Patch 1 Patch Transdermal daily  mupirocin 2% Ointment 1 Application(s) Both Nostrils every 12 hours  nicotine -  14 mG/24Hr(s) Patch 1 patch Transdermal daily  pantoprazole    Tablet 40 milliGRAM(s) Oral before breakfast  polyethylene glycol 3350 17 Gram(s) Oral daily  risperiDONE   Tablet 2 milliGRAM(s) Oral <User Schedule>  senna 2 Tablet(s) Oral at bedtime  sevelamer carbonate 800 milliGRAM(s) Oral three times a day with meals  simvastatin 20 milliGRAM(s) Oral at bedtime  tamsulosin 0.8 milliGRAM(s) Oral at bedtime                            10.6   6.54  )-----------( 214      ( 04 Sep 2021 08:41 )             34.4       09-04    141  |  106  |  59<H>  ----------------------------<  90  4.3   |  29  |  3.38<H>    Ca    8.9      04 Sep 2021 08:41  Phos  4.2     09-04  Mg     2.2     09-04    TPro  7.0  /  Alb  2.5<L>  /  TBili  0.3  /  DBili  x   /  AST  9<L>  /  ALT  11  /  AlkPhos  76  09-04    T(C): 36.9 (09-04-21 @ 12:54), Max: 37.4 (09-03-21 @ 20:41)  HR: 81 (09-04-21 @ 12:54) (81 - 84)  BP: 119/63 (09-04-21 @ 12:54) (108/56 - 119/63)  RR: 18 (09-04-21 @ 12:54) (17 - 18)  SpO2: 93% (09-04-21 @ 12:56) (93% - 96%)  Wt(kg): --    I&O's Summary    03 Sep 2021 07:01  -  04 Sep 2021 07:00  --------------------------------------------------------  IN: 0 mL / OUT: 4000 mL / NET: -4000 mL    04 Sep 2021 07:01  -  04 Sep 2021 13:19  --------------------------------------------------------  IN: 0 mL / OUT: 1000 mL / NET: -1000 mL    Gen: pleasant overweight white male in no acute distress, cooperative.  HEENT:  (-)icterus (-)pallor  CV: N S1 S2 1/6 ABIGAIL (+)2 Pulses B/l  Resp:  Clear to ausculatation B/L, normal effort  GI: (+) BS Soft, NT, ND  Lymph:  severe  edema, (-)obvious lymphadenopathy  Skin: Warm to touch, Normal turgor  Psych: Appropriate mood and affect      ASSESSMENT/PLAN: 	61y Male morbid obesity from [facility] [place] ambulates w/ walker has past medical hx of COPD not on O2 at home, hypothyroidism, anemia, left DVT, CKD, BPH, HLD, bipolar disorder, ambulatory dysfunction, lymphedema was BIBEMS as patient was complaining of worsening lower extremity weakness and  lightheadedness    - Recovering well after Narcan for opiate toxicity.  Cont tele  - Echo is overall reassuring.  - Work toward discharge back to assisted living.  No opposition to discharge/xfer when available.  - No angina or CHF  - Clinical presentation is sequale of a primary pulmonary process    Munir Bass M.D.  Cardiac Electrophysiology  [phone]

## 2021-09-04 NOTE — PROGRESS NOTE ADULT - SUBJECTIVE AND OBJECTIVE BOX
NEPHROLOGY MEDICAL CARE, [facility] - Dr. Taj Valenzuela/ Dr. Hazel Solis/ Dr. Cosmo Root/ Dr. Fang Gifford    Patient was seen and examined at bedside.    CC: patient is okay and no sob    Vital Signs Last 24 Hrs  T(C): 36.9 (04 Sep 2021 12:54), Max: 37.4 (03 Sep 2021 20:41)  T(F): 98.4 (04 Sep 2021 12:54), Max: 99.4 (03 Sep 2021 20:41)  HR: 81 (04 Sep 2021 12:54) (81 - 84)  BP: 119/63 (04 Sep 2021 12:54) (108/56 - 119/63)  BP(mean): --  RR: 18 (04 Sep 2021 12:54) (17 - 18)  SpO2: 93% (04 Sep 2021 12:56) (93% - 96%)    09-03 @ 07:01 - 09-04 @ 07:00  --------------------------------------------------------  IN: 0 mL / OUT: 4000 mL / NET: -4000 mL    09-04 @ 07:01  -  09-04 @ 13:56  --------------------------------------------------------  IN: 0 mL / OUT: 1000 mL / NET: -1000 mL        PHYSICAL EXAM:  General: No acute respiratory distress; obese  Eyes: conjunctiva and sclera clear  ENMT: Atraumatic, Normocephalic, supple, No JVD present. Moist mucous membranes  Respiratory: b/l poor air entry; No rales, rhonchi, wheezing  Cardiovasular: S1S2+; no m/r/g  Gastrointestinal: Soft, Non-tender, Nondistended; Bowel sounds present  Neuro:  Awake, Alert & Oriented X3  Ext:  2+ pedal edema with chronic venous stasis; No Cyanosis  Skin: chronic vascular changes of the b/l legs.    MEDICATIONS:  MEDICATIONS  (STANDING):  ALBUTerol    90 MICROgram(s) HFA Inhaler 2 Puff(s) Inhalation <User Schedule>  aspirin enteric coated 81 milliGRAM(s) Oral daily  budesonide 160 MICROgram(s)/formoterol 4.5 MICROgram(s) Inhaler 2 Puff(s) Inhalation two times a day  buPROPion XL (24-Hour) . 150 milliGRAM(s) Oral daily  busPIRone 10 milliGRAM(s) Oral two times a day  calcitriol   Capsule 0.25 MICROGram(s) Oral daily  cholecalciferol 1000 Unit(s) Oral daily  diVALproex  milliGRAM(s) Oral <User Schedule>  ferrous    sulfate 325 milliGRAM(s) Oral daily  finasteride 5 milliGRAM(s) Oral daily  folic acid 1 milliGRAM(s) Oral daily  furosemide   Injectable 40 milliGRAM(s) IV Push daily  heparin   Injectable 5000 Unit(s) SubCutaneous every 8 hours  levothyroxine 125 MICROGram(s) Oral daily  lidocaine   4% Patch 1 Patch Transdermal daily  mupirocin 2% Ointment 1 Application(s) Both Nostrils every 12 hours  nicotine -  14 mG/24Hr(s) Patch 1 patch Transdermal daily  pantoprazole    Tablet 40 milliGRAM(s) Oral before breakfast  polyethylene glycol 3350 17 Gram(s) Oral daily  risperiDONE   Tablet 2 milliGRAM(s) Oral <User Schedule>  senna 2 Tablet(s) Oral at bedtime  sevelamer carbonate 800 milliGRAM(s) Oral three times a day with meals  simvastatin 20 milliGRAM(s) Oral at bedtime  tamsulosin 0.8 milliGRAM(s) Oral at bedtime    MEDICATIONS  (PRN):  acetaminophen   Tablet .. 1000 milliGRAM(s) Oral every 8 hours PRN Moderate Pain (4 - 6)          LABS:                        10.6   6.54  )-----------( 214      ( 04 Sep 2021 08:41 )             34.4     09-04    141  |  106  |  59<H>  ----------------------------<  90  4.3   |  29  |  3.38<H>    Ca    8.9      04 Sep 2021 08:41  Phos  4.2     09-04  Mg     2.2     09-04    TPro  7.0  /  Alb  2.5<L>  /  TBili  0.3  /  DBili  x   /  AST  9<L>  /  ALT  11  /  AlkPhos  76  09-04        Magnesium, Serum: 2.2 mg/dL (09-04 @ 08:41)  Phosphorus Level, Serum: 4.2 mg/dL (09-04 @ 08:41)    Urine studies    PTH and Vit D:

## 2021-09-04 NOTE — PROGRESS NOTE ADULT - ASSESSMENT
1. SHEYLA due to r/o ATN due to possible CRS  -scr is stable and close to baseline and stable electrolytes; continue lasix 40mg iv daily. TOV done.  -UA shows some proteinuria. -resend urine lytes (uosm, urine sodium, urine creatinine, chloride, potassium), spot protein to creatinine ratio  - renal sonogram shows no hydronephrosis with Right kidney 11.8cm and Left kidney 12.0cm with increase echogenicity   -Adjust meds to eGFR and avoid IV Gadolinium contrast,NSAIDs, and phosphate enema.  -Monitor I/O's daily.   -Monitor SMA daily.  2. CKD stage 4 most likely due to ischemic nephropathy  -baseline scr around 3.1mg/dL  -Keep patient euvolemic and renal diet  -Avoid Nephrotoxic Meds/ Agents such as (NSAIDs, IV contrast, Aminoglycosides such as gentamicin, -Gadolinium contrast, Phosphate containing enemas, etc..)  -Adjust Medications according to eGFR  3. Anemia:  -on iron tabs.   -F/u CBC daily  -transfuse if HB < 7.0.  3. Mineral Bone Disease:  -phos is elevated and on renvela 1 tab tid and Vitamin 25 OH is low and PTH intact is 330, add calcitriol 0.25mcg daily.   4. Respiratory Acidosis: on bipap and f/u echo. on iv lasix. cardiology consulted.

## 2021-09-04 NOTE — CHART NOTE - NSCHARTNOTEFT_GEN_A_CORE
Patient stated he has already provided his brother with an update.  Patient stated he told his brother he is improving, he will need to rehab and hopefully in after a month of rehab he will be able to return to his IRA.

## 2021-09-04 NOTE — PROGRESS NOTE ADULT - ASSESSMENT
l Drug Utilization Report  Search Terms: bear abdul, 1960Search Date: 09/01/2021 17:47:17 PM  The Drug Utilization Report below displays all of the controlled substance prescriptions, if any, that your patient has filled in the last twelve months. The information displayed on this report is compiled from pharmacy submissions to the Department, and accurately reflects the information as submitted by the pharmacies.    This report was requested by: Eula Olivier | Reference #: [id number]    Others' Prescriptions  Patient Name: Bear AbdulBirth Date: 1960  Address: [address] [place], [place] [address]Nmc: Male  Rx Written	Rx Dispensed	Drug	Quantity	Days Supply	Prescriber Name	Prescriber Joycelyn #	Payment Method  08/27/2021	08/27/2021	hydromorphone 4 mg tablet	30	30	Owen Phillips	[id number]	[facility]  11/06/2020	11/07/2020	alprazolam 0.5 mg tablet	5	5	Owen Phillips	[id number]	Medicaid  [facility]

## 2021-09-04 NOTE — PROGRESS NOTE ADULT - PROBLEM SELECTOR PLAN 1
CXR noted above  SPO2 93% on 2L via n/c  Pt takes Albuterol and Symbicort at home  Continue home regimen   Monitor oxygen saturations

## 2021-09-04 NOTE — PROGRESS NOTE ADULT - ASSESSMENT
61 year old man with morbid obesity from [facility] [place] ambulates w/ walker has past medical hx of COPD not on O2 at home, hypothyroidism, anemia, left DVT, CKD, BPH, HLD, bipolar disorder, initially admitted for increased lethargy is admitted for hypercapnic respiratroy failure requiring BIPAP     9/2 - Failed TOV, daley reinserted  9/3 - Awtg PT recs for d/c  9-4 - PT rec TG, pending choices

## 2021-09-04 NOTE — PROGRESS NOTE ADULT - SUBJECTIVE AND OBJECTIVE BOX
`Patient is a 61y old  Male who presents with a chief complaint of SHEYLA ON CKD, DEMAND ISCHEMIA, AMBULATORY DYSFUNCTION (03 Sep 2021 15:47)    PATIENT IS SEEN AND EXAMINED IN MEDICAL FLOOR.  DORIST [    ]    ALYSIA [   ]      GT [   ]    ALLERGIES:  No Known Allergies      Daily     Daily Weight in k.5 (04 Sep 2021 04:57)    VITALS:    Vital Signs Last 24 Hrs  T(C): 36.6 (04 Sep 2021 04:57), Max: 37.4 (03 Sep 2021 20:41)  T(F): 97.8 (04 Sep 2021 04:57), Max: 99.4 (03 Sep 2021 20:41)  HR: 84 (04 Sep 2021 04:57) (77 - 88)  BP: 109/67 (04 Sep 2021 04:57) (108/56 - 131/72)  BP(mean): 67 (03 Sep 2021 13:27) (67 - 88)  RR: 17 (04 Sep 2021 04:57) (16 - 18)  SpO2: 93% (04 Sep 2021 04:57) (92% - 95%)    LABS:    CBC Full  -  ( 04 Sep 2021 08:41 )  WBC Count : 6.54 K/uL  RBC Count : 3.40 M/uL  Hemoglobin : 10.6 g/dL  Hematocrit : 34.4 %  Platelet Count - Automated : 214 K/uL  Mean Cell Volume : 101.2 fl  Mean Cell Hemoglobin : 31.2 pg  Mean Cell Hemoglobin Concentration : 30.8 gm/dL  Auto Neutrophil # : x  Auto Lymphocyte # : x  Auto Monocyte # : x  Auto Eosinophil # : x  Auto Basophil # : x  Auto Neutrophil % : x  Auto Lymphocyte % : x  Auto Monocyte % : x  Auto Eosinophil % : x  Auto Basophil % : x          141  |  106  |  59<H>  ----------------------------<  90  4.3   |  29  |  3.38<H>    Ca    8.9      04 Sep 2021 08:41  Phos  4.2     09-04  Mg     2.2     04    TPro  7.0  /  Alb  2.5<L>  /  TBili  0.3  /  DBili  x   /  AST  9<L>  /  ALT  11  /  AlkPhos  76  -04    CAPILLARY BLOOD GLUCOSE      POCT Blood Glucose.: 95 mg/dL (04 Sep 2021 05:54)  POCT Blood Glucose.: 116 mg/dL (03 Sep 2021 23:56)  POCT Blood Glucose.: 122 mg/dL (03 Sep 2021 17:02)  POCT Blood Glucose.: 112 mg/dL (03 Sep 2021 12:51)        LIVER FUNCTIONS - ( 04 Sep 2021 08:41 )  Alb: 2.5 g/dL / Pro: 7.0 g/dL / ALK PHOS: 76 U/L / ALT: 11 U/L DA / AST: 9 U/L / GGT: x           Creatinine Trend: 3.38<--, 3.65<--, 3.62<--, 4.31<--, 4.01<--, 3.82<--  I&O's Summary    03 Sep 2021 07:01  -  04 Sep 2021 07:00  --------------------------------------------------------  IN: 0 mL / OUT: 4000 mL / NET: -4000 mL            Clean Catch Clean Catch (Midstream)  08-30 @ 21:13   <10,000 CFU/mL Normal Urogenital Ivelisse  --  --          MEDICATIONS:    MEDICATIONS  (STANDING):  ALBUTerol    90 MICROgram(s) HFA Inhaler 2 Puff(s) Inhalation <User Schedule>  aspirin enteric coated 81 milliGRAM(s) Oral daily  budesonide 160 MICROgram(s)/formoterol 4.5 MICROgram(s) Inhaler 2 Puff(s) Inhalation two times a day  buPROPion XL (24-Hour) . 150 milliGRAM(s) Oral daily  busPIRone 10 milliGRAM(s) Oral two times a day  calcitriol   Capsule 0.25 MICROGram(s) Oral daily  cholecalciferol 1000 Unit(s) Oral daily  diVALproex  milliGRAM(s) Oral <User Schedule>  ferrous    sulfate 325 milliGRAM(s) Oral daily  finasteride 5 milliGRAM(s) Oral daily  folic acid 1 milliGRAM(s) Oral daily  furosemide   Injectable 40 milliGRAM(s) IV Push daily  heparin   Injectable 5000 Unit(s) SubCutaneous every 8 hours  levothyroxine 125 MICROGram(s) Oral daily  lidocaine   4% Patch 1 Patch Transdermal daily  mupirocin 2% Ointment 1 Application(s) Both Nostrils every 12 hours  nicotine -  14 mG/24Hr(s) Patch 1 patch Transdermal daily  pantoprazole    Tablet 40 milliGRAM(s) Oral before breakfast  polyethylene glycol 3350 17 Gram(s) Oral daily  risperiDONE   Tablet 2 milliGRAM(s) Oral <User Schedule>  senna 2 Tablet(s) Oral at bedtime  sevelamer carbonate 800 milliGRAM(s) Oral three times a day with meals  simvastatin 20 milliGRAM(s) Oral at bedtime  tamsulosin 0.8 milliGRAM(s) Oral at bedtime      MEDICATIONS  (PRN):  acetaminophen   Tablet .. 1000 milliGRAM(s) Oral every 8 hours PRN Moderate Pain (4 - 6)      REVIEW OF SYSTEMS:                           ALL ROS DONE [ X   ]    CONSTITUTIONAL:  LETHARGIC [   ], FEVER [   ], UNRESPONSIVE [   ]  CVS:  CP  [   ], SOB, [   ], PALPITATIONS [   ], DIZZYNESS [   ]  RS: COUGH [   ], SPUTUM [   ]  GI: ABDOMINAL PAIN [   ], NAUSEA [   ], VOMITINGS [   ], DIARRHEA [   ], CONSTIPATION [   ]  :  DYSURIA [   ], NOCTURIA [   ], INCREASED FREQUENCY [   ], DRIBLING [   ],  SKELETAL: PAINFUL JOINTS [   ], SWOLLEN JOINTS [   ], NECK ACHE [   ], LOW BACK ACHE [   ],  SKIN : ULCERS [   ], RASH [   ], ITCHING [   ]  CNS: HEAD ACHE [   ], DOUBLE VISION [   ], BLURRED VISION [   ], AMS / CONFUSION [   ], SEIZURES [   ], WEAKNESS [   ],TINGLING / NUMBNESS [   ]    PHYSICAL EXAMINATION:  GENERAL APPEARANCE: NO DISTRESS  HEENT:  NO PALLOR, NO  JVD,  NO   NODES, NECK SUPPLE  CVS: S1 +, S2 +,   RS: AEEB,  OCCASIONAL  RALES +,   NO RONCHI  ABD: SOFT, NT, NO, BS +  EXT: PE ++  SKIN: WARM,   SKELETAL:  ROM ACCEPTABLE  CNS:  AAO X 2     RADIOLOGY :    RADIOLOGY REVIEWED    ASSESSMENT :     Weakness    No pertinent past medical history    Hypothyroid    Hyperlipidemia    Schizophrenia    Schizoaffective disorder    Ambulatory dysfunction    Anemia    History of BPH    CKD (chronic kidney disease)    Chronic obstructive pulmonary disease (COPD)    Bipolar disorder    Bipolar disorder    No significant past surgical history        PLAN:  HPI:  61 year old man with morbid obesity from [facility] [place] ambulates w/ walker has past medical hx of COPD not on O2 at home, hypothyroidism, anemia, left DVT, CKD, BPH, HLD, bipolar disorder, ambulatory dysfunction, lymphedema was BIBEMS as patient was complaining of worsening lower extremity weakness, lightheaded and overall not feeling well for one day. Of note, patient with slurred speech at baseline since childhood and known ambulatory dysfunction reason why he is on IRA. Patient denies chest pain, palpitations, sob or other symptoms.      [facility] full code    (30 Aug 2021 17:02)    # PT EVAL RECOMMENDED TG - D/C PLAN TO [facility]    # RRT FOR AMS [] FOUND TO HAVE ACUTE HYPERCAPNIA - GIVEN NARCAN [PATIENT'S RESPONSIVENESS IMPROVED], NARCOTICS DISCONTINUED, TRANSFERRED TO ICU ON BIPAP  - PAIN MGMT CONSULT  # HYPOGLYCEMIA S/T POOR PO INTAKE - MONITOR BG  # ELEVATED TROPONIN - TREND, ECHOCARDIOGRAM - W/ MILD PULM HTN, CARDIOLOGY CONSULT  # SHEYLA ON CKD - IMPROVING - FAILED TOV W/ HATFIELD - MONITOR CR, AVOID NEPHROTOXIC AGENTS - ? ATN  - MONITOR IS AND OS  - HOLD DIURETICS  - NEPHROLOGY CONSULT  # COPD  # HYPOTHYROIDISM  # MORBID OBESITY  # CHRONIC VENOUS INSUFFICIENCY, HX OF LEFT DVT  # F/U PT EVAL  # GI AND DVT PPX       `Patient is a 61y old  Male who presents with a chief complaint of SHEYLA ON CKD, DEMAND ISCHEMIA, AMBULATORY DYSFUNCTION (03 Sep 2021 15:47)    PATIENT IS SEEN AND EXAMINED IN MEDICAL FLOOR.  DORIST [    ]    ALYSIA [   ]      GT [   ]    ALLERGIES:  No Known Allergies      Daily     Daily Weight in k.5 (04 Sep 2021 04:57)    VITALS:    Vital Signs Last 24 Hrs  T(C): 36.6 (04 Sep 2021 04:57), Max: 37.4 (03 Sep 2021 20:41)  T(F): 97.8 (04 Sep 2021 04:57), Max: 99.4 (03 Sep 2021 20:41)  HR: 84 (04 Sep 2021 04:57) (77 - 88)  BP: 109/67 (04 Sep 2021 04:57) (108/56 - 131/72)  BP(mean): 67 (03 Sep 2021 13:27) (67 - 88)  RR: 17 (04 Sep 2021 04:57) (16 - 18)  SpO2: 93% (04 Sep 2021 04:57) (92% - 95%)    LABS:    CBC Full  -  ( 04 Sep 2021 08:41 )  WBC Count : 6.54 K/uL  RBC Count : 3.40 M/uL  Hemoglobin : 10.6 g/dL  Hematocrit : 34.4 %  Platelet Count - Automated : 214 K/uL  Mean Cell Volume : 101.2 fl  Mean Cell Hemoglobin : 31.2 pg  Mean Cell Hemoglobin Concentration : 30.8 gm/dL  Auto Neutrophil # : x  Auto Lymphocyte # : x  Auto Monocyte # : x  Auto Eosinophil # : x  Auto Basophil # : x  Auto Neutrophil % : x  Auto Lymphocyte % : x  Auto Monocyte % : x  Auto Eosinophil % : x  Auto Basophil % : x          141  |  106  |  59<H>  ----------------------------<  90  4.3   |  29  |  3.38<H>    Ca    8.9      04 Sep 2021 08:41  Phos  4.2     09-04  Mg     2.2     04    TPro  7.0  /  Alb  2.5<L>  /  TBili  0.3  /  DBili  x   /  AST  9<L>  /  ALT  11  /  AlkPhos  76  -04    CAPILLARY BLOOD GLUCOSE      POCT Blood Glucose.: 95 mg/dL (04 Sep 2021 05:54)  POCT Blood Glucose.: 116 mg/dL (03 Sep 2021 23:56)  POCT Blood Glucose.: 122 mg/dL (03 Sep 2021 17:02)  POCT Blood Glucose.: 112 mg/dL (03 Sep 2021 12:51)        LIVER FUNCTIONS - ( 04 Sep 2021 08:41 )  Alb: 2.5 g/dL / Pro: 7.0 g/dL / ALK PHOS: 76 U/L / ALT: 11 U/L DA / AST: 9 U/L / GGT: x           Creatinine Trend: 3.38<--, 3.65<--, 3.62<--, 4.31<--, 4.01<--, 3.82<--  I&O's Summary    03 Sep 2021 07:01  -  04 Sep 2021 07:00  --------------------------------------------------------  IN: 0 mL / OUT: 4000 mL / NET: -4000 mL            Clean Catch Clean Catch (Midstream)  08-30 @ 21:13   <10,000 CFU/mL Normal Urogenital Ivelisse  --  --          MEDICATIONS:    MEDICATIONS  (STANDING):  ALBUTerol    90 MICROgram(s) HFA Inhaler 2 Puff(s) Inhalation <User Schedule>  aspirin enteric coated 81 milliGRAM(s) Oral daily  budesonide 160 MICROgram(s)/formoterol 4.5 MICROgram(s) Inhaler 2 Puff(s) Inhalation two times a day  buPROPion XL (24-Hour) . 150 milliGRAM(s) Oral daily  busPIRone 10 milliGRAM(s) Oral two times a day  calcitriol   Capsule 0.25 MICROGram(s) Oral daily  cholecalciferol 1000 Unit(s) Oral daily  diVALproex  milliGRAM(s) Oral <User Schedule>  ferrous    sulfate 325 milliGRAM(s) Oral daily  finasteride 5 milliGRAM(s) Oral daily  folic acid 1 milliGRAM(s) Oral daily  furosemide   Injectable 40 milliGRAM(s) IV Push daily  heparin   Injectable 5000 Unit(s) SubCutaneous every 8 hours  levothyroxine 125 MICROGram(s) Oral daily  lidocaine   4% Patch 1 Patch Transdermal daily  mupirocin 2% Ointment 1 Application(s) Both Nostrils every 12 hours  nicotine -  14 mG/24Hr(s) Patch 1 patch Transdermal daily  pantoprazole    Tablet 40 milliGRAM(s) Oral before breakfast  polyethylene glycol 3350 17 Gram(s) Oral daily  risperiDONE   Tablet 2 milliGRAM(s) Oral <User Schedule>  senna 2 Tablet(s) Oral at bedtime  sevelamer carbonate 800 milliGRAM(s) Oral three times a day with meals  simvastatin 20 milliGRAM(s) Oral at bedtime  tamsulosin 0.8 milliGRAM(s) Oral at bedtime      MEDICATIONS  (PRN):  acetaminophen   Tablet .. 1000 milliGRAM(s) Oral every 8 hours PRN Moderate Pain (4 - 6)      REVIEW OF SYSTEMS:                           ALL ROS DONE [ X   ]    CONSTITUTIONAL:  LETHARGIC [   ], FEVER [   ], UNRESPONSIVE [   ]  CVS:  CP  [   ], SOB, [   ], PALPITATIONS [   ], DIZZYNESS [   ]  RS: COUGH [   ], SPUTUM [   ]  GI: ABDOMINAL PAIN [   ], NAUSEA [   ], VOMITINGS [   ], DIARRHEA [   ], CONSTIPATION [   ]  :  DYSURIA [   ], NOCTURIA [   ], INCREASED FREQUENCY [   ], DRIBLING [   ],  SKELETAL: PAINFUL JOINTS [   ], SWOLLEN JOINTS [   ], NECK ACHE [   ], LOW BACK ACHE [   ],  SKIN : ULCERS [   ], RASH [   ], ITCHING [   ]  CNS: HEAD ACHE [   ], DOUBLE VISION [   ], BLURRED VISION [   ], AMS / CONFUSION [   ], SEIZURES [   ], WEAKNESS [   ],TINGLING / NUMBNESS [   ]    PHYSICAL EXAMINATION:  GENERAL APPEARANCE: NO DISTRESS  HEENT:  NO PALLOR, NO  JVD,  NO   NODES, NECK SUPPLE  CVS: S1 +, S2 +,   RS: AEEB,  OCCASIONAL  RALES +,   NO RONCHI  ABD: SOFT, NT, NO, BS +  EXT: PE ++  SKIN: WARM,   SKELETAL:  ROM ACCEPTABLE  CNS:  AAO X 2     RADIOLOGY :    RADIOLOGY REVIEWED    ASSESSMENT :     Weakness    No pertinent past medical history    Hypothyroid    Hyperlipidemia    Schizophrenia    Schizoaffective disorder    Ambulatory dysfunction    Anemia    History of BPH    CKD (chronic kidney disease)    Chronic obstructive pulmonary disease (COPD)    Bipolar disorder    Bipolar disorder    No significant past surgical history        PLAN:  HPI:  61 year old man with morbid obesity from [facility] [place] ambulates w/ walker has past medical hx of COPD not on O2 at home, hypothyroidism, anemia, left DVT, CKD, BPH, HLD, bipolar disorder, ambulatory dysfunction, lymphedema was BIBEMS as patient was complaining of worsening lower extremity weakness, lightheaded and overall not feeling well for one day. Of note, patient with slurred speech at baseline since childhood and known ambulatory dysfunction reason why he is on IRA. Patient denies chest pain, palpitations, sob or other symptoms.      [facility] full code    (30 Aug 2021 17:02)    # PT EVAL RECOMMENDED TG - D/C PLAN TO [facility]    # RRT FOR AMS [] FOUND TO HAVE ACUTE HYPERCAPNIA - GIVEN NARCAN [PATIENT'S RESPONSIVENESS IMPROVED], NARCOTICS DISCONTINUED, TRANSFERRED TO ICU ON BIPAP  - PAIN MGMT CONSULT  # HYPOGLYCEMIA S/T POOR PO INTAKE - RESOLVED  # ELEVATED TROPONIN - TREND, ECHOCARDIOGRAM - W/ MILD PULM HTN, CARDIOLOGY CONSULT  # SHEYLA ON CKD - IMPROVING - FAILED TOV W/ HATFIELD - MONITOR CR, AVOID NEPHROTOXIC AGENTS - ? ATN  - MONITOR IS AND OS  - INCREASED FLOMAX, CONTINUE FINASTERIDE  - HOLD DIURETICS  - NEPHROLOGY CONSULT  # COPD  # HYPOTHYROIDISM  # MORBID OBESITY  # CHRONIC VENOUS INSUFFICIENCY, HX OF LEFT DVT  # F/U PT EVAL  # GI AND DVT PPX

## 2021-09-04 NOTE — PROGRESS NOTE ADULT - SUBJECTIVE AND OBJECTIVE BOX
GAYLA PEARCE    SCU progress note    INTERVAL HPI/OVERNIGHT EVENTS: *** No new overnight events.  Seen and examined at bedside.     DNR [  ]   DNI  [  ]  FULL CODE [ X ]    Covid - 19 PCR:      The 4Ms    What Matters Most: see GOC  Age appropriate Medications/Screen for High Risk Medication: Yes  Mentation: see CAM below  Mobility: defer to physical exam    The Confusion Assessment Method (CAM) Diagnostic Algorithm     1: Acute Onset or Fluctuating Course  - Is there evidence of an acute change in mental status from the patient’s baseline? Did the (abnormal) behavior  fluctuate during the day, that is, tend to come and go, or increase and decrease in severity?  [  ] YES [ X ] NO     2: Inattention  - Did the patient have difficulty focusing attention, being easily distractible, or having difficulty keeping track of what was being said?  [  ] YES [ X ] NO     3: Disorganized thinking  -Was the patient’s thinking disorganized or incoherent, such as rambling or irrelevant conversation, unclear or illogical flow of ideas, or unpredictable switching from subject to subject?  [  ] YES [ X ] NO    4: Altered Level of consciousness?  [  ] YES [ X ] NO    The diagnosis of delirium by CAM requires the presence of features 1 and 2 and either 3 or 4.    PRESSORS: [  ] YES [ X ] NO    Cardiovascular:  Heart Failure  Acute   Acute on Chronic  Chronic       furosemide   Injectable 40 milliGRAM(s) IV Push daily  tamsulosin 0.8 milliGRAM(s) Oral at bedtime    Pulmonary:  ALBUTerol    90 MICROgram(s) HFA Inhaler 2 Puff(s) Inhalation <User Schedule>  budesonide 160 MICROgram(s)/formoterol 4.5 MICROgram(s) Inhaler 2 Puff(s) Inhalation two times a day    Hematalogic:  aspirin enteric coated 81 milliGRAM(s) Oral daily  heparin   Injectable 5000 Unit(s) SubCutaneous every 8 hours    Other:  acetaminophen   Tablet .. 1000 milliGRAM(s) Oral every 8 hours PRN  buPROPion XL (24-Hour) . 150 milliGRAM(s) Oral daily  busPIRone 10 milliGRAM(s) Oral two times a day  calcitriol   Capsule 0.25 MICROGram(s) Oral daily  cholecalciferol 1000 Unit(s) Oral daily  diVALproex  milliGRAM(s) Oral <User Schedule>  ferrous    sulfate 325 milliGRAM(s) Oral daily  finasteride 5 milliGRAM(s) Oral daily  folic acid 1 milliGRAM(s) Oral daily  levothyroxine 125 MICROGram(s) Oral daily  lidocaine   4% Patch 1 Patch Transdermal daily  mupirocin 2% Ointment 1 Application(s) Both Nostrils every 12 hours  nicotine -  14 mG/24Hr(s) Patch 1 patch Transdermal daily  pantoprazole    Tablet 40 milliGRAM(s) Oral before breakfast  polyethylene glycol 3350 17 Gram(s) Oral daily  risperiDONE   Tablet 2 milliGRAM(s) Oral <User Schedule>  senna 2 Tablet(s) Oral at bedtime  sevelamer carbonate 800 milliGRAM(s) Oral three times a day with meals  simvastatin 20 milliGRAM(s) Oral at bedtime    acetaminophen   Tablet .. 1000 milliGRAM(s) Oral every 8 hours PRN  ALBUTerol    90 MICROgram(s) HFA Inhaler 2 Puff(s) Inhalation <User Schedule>  aspirin enteric coated 81 milliGRAM(s) Oral daily  budesonide 160 MICROgram(s)/formoterol 4.5 MICROgram(s) Inhaler 2 Puff(s) Inhalation two times a day  buPROPion XL (24-Hour) . 150 milliGRAM(s) Oral daily  busPIRone 10 milliGRAM(s) Oral two times a day  calcitriol   Capsule 0.25 MICROGram(s) Oral daily  cholecalciferol 1000 Unit(s) Oral daily  diVALproex  milliGRAM(s) Oral <User Schedule>  ferrous    sulfate 325 milliGRAM(s) Oral daily  finasteride 5 milliGRAM(s) Oral daily  folic acid 1 milliGRAM(s) Oral daily  furosemide   Injectable 40 milliGRAM(s) IV Push daily  heparin   Injectable 5000 Unit(s) SubCutaneous every 8 hours  levothyroxine 125 MICROGram(s) Oral daily  lidocaine   4% Patch 1 Patch Transdermal daily  mupirocin 2% Ointment 1 Application(s) Both Nostrils every 12 hours  nicotine -  14 mG/24Hr(s) Patch 1 patch Transdermal daily  pantoprazole    Tablet 40 milliGRAM(s) Oral before breakfast  polyethylene glycol 3350 17 Gram(s) Oral daily  risperiDONE   Tablet 2 milliGRAM(s) Oral <User Schedule>  senna 2 Tablet(s) Oral at bedtime  sevelamer carbonate 800 milliGRAM(s) Oral three times a day with meals  simvastatin 20 milliGRAM(s) Oral at bedtime  tamsulosin 0.8 milliGRAM(s) Oral at bedtime    Drug Dosing Weight  Height (cm): 172.7 (30 Aug 2021 08:52)  Weight (kg): 135.1 (31 Aug 2021 22:45)  BMI (kg/m2): 45.3 (31 Aug 2021 22:45)  BSA (m2): 2.42 (31 Aug 2021 22:45)    CENTRAL LINE: [  ] YES [ X ] NO  LOCATION:   DATE INSERTED:  REMOVE: [  ] YES [  ] NO  EXPLAIN:    HATFIELD: [ X ] YES [  ] NO  DATE INSERTED:  9/2/2021  REMOVE:  [  ] YES [ X ] NO  EXPLAIN:  Failed TOV    PAST MEDICAL & SURGICAL HISTORY:  Hypothyroid    Hyperlipidemia    Ambulatory dysfunction    Anemia    History of BPH    CKD (chronic kidney disease)    Chronic obstructive pulmonary disease (COPD)    Bipolar disorder                09-03 @ 07:01  -  09-04 @ 07:00  --------------------------------------------------------  IN: 0 mL / OUT: 4000 mL / NET: -4000 mL            PHYSICAL EXAM:    GENERAL: NAD, well-groomed  HEAD:  Atraumatic, Normocephalic  EYES: PERRLA, conjunctiva and sclera clear  ENMT: Moist mucous membranes, No lesions  NECK: Supple, No JVD  NERVOUS SYSTEM:  Alert & Oriented X3, Good concentration  CHEST/LUNG: Clear to auscultation bilaterally  HEART: Regular rate and rhythm  ABDOMEN: Soft, Nontender, Nondistended; Bowel sounds present  EXTREMITIES:  + Peripheral Pulses  LYMPH: No lymphadenopathy noted  SKIN: Warm and dry; No rashes or lesions      LABS:  CBC Full  -  ( 04 Sep 2021 08:41 )  WBC Count : 6.54 K/uL  RBC Count : 3.40 M/uL  Hemoglobin : 10.6 g/dL  Hematocrit : 34.4 %  Platelet Count - Automated : 214 K/uL  Mean Cell Volume : 101.2 fl  Mean Cell Hemoglobin : 31.2 pg  Mean Cell Hemoglobin Concentration : 30.8 gm/dL  Auto Neutrophil # : x  Auto Lymphocyte # : x  Auto Monocyte # : x  Auto Eosinophil # : x  Auto Basophil # : x  Auto Neutrophil % : x  Auto Lymphocyte % : x  Auto Monocyte % : x  Auto Eosinophil % : x  Auto Basophil % : x    09-04    141  |  106  |  59<H>  ----------------------------<  90  4.3   |  29  |  3.38<H>    Ca    8.9      04 Sep 2021 08:41  Phos  4.2     09-04  Mg     2.2     09-04    TPro  7.0  /  Alb  2.5<L>  /  TBili  0.3  /  DBili  x   /  AST  9<L>  /  ALT  11  /  AlkPhos  76  09-04              [  ]  DVT Prophylaxis  [  ]  Nutrition, Brand, Rate (  Goal Rate)          Abnormal Nutritional Status -  Malnutrition   Cachexia      Morbid Obesity BMI >/=40    RADIOLOGY & ADDITIONAL STUDIES:  ***     < from: US Renal (09.02.21 @ 11:05) >  IMPRESSION:    Technically limited exam.    Increased renal cortical echogenicity compatible with medical renal disease.    < end of copied text >    < from: Xray Chest 1 View- PORTABLE-Routine (Xray Chest 1 View- PORTABLE-Routine in AM.) (09.01.21 @ 07:00) >  IMPRESSION:  Atelectasis. No evidence of pulmonary edema.    < end of copied text >    Goals of Care Discussion with Family/Proxy/Other   - see note from/family meeting set up for...

## 2021-09-04 NOTE — PROGRESS NOTE ADULT - SUBJECTIVE AND OBJECTIVE BOX
Source of information: , Chart review  Patient language: English  : n/a    CC:  chronic leg pain    This is a 61yMale patient who presents to the hospital for Patient is a 61y old  Male who presents with a chief complaint of SHEYLA ON CKD, DEMAND ISCHEMIA, AMBULATORY DYSFUNCTION (04 Sep 2021 10:29)    Pt with sheyla on ckd and copd.  + chronic LE pain.  Pt was recently started on hydromorphone 4mg po q daily.  Regimen was resumed in house. Pt given dose and became increasingly lethargic and given narcan. Pt states pain is well controlled.      PAIN SCORE:      3/10   SCALE USED: (1-10 NRS)      PAST MEDICAL & SURGICAL HISTORY:  Hypothyroid    Hyperlipidemia    Ambulatory dysfunction    Anemia    History of BPH    CKD (chronic kidney disease)    Chronic obstructive pulmonary disease (COPD)    Bipolar disorder        FAMILY HISTORY:        SOCIAL HISTORY:  [x ] Denies Smoking, Alcohol, or Drug Use    Allergies    No Known Allergies    Intolerances        MEDICATIONS:    MEDICATIONS  (STANDING):  ALBUTerol    90 MICROgram(s) HFA Inhaler 2 Puff(s) Inhalation <User Schedule>  aspirin enteric coated 81 milliGRAM(s) Oral daily  budesonide 160 MICROgram(s)/formoterol 4.5 MICROgram(s) Inhaler 2 Puff(s) Inhalation two times a day  buPROPion XL (24-Hour) . 150 milliGRAM(s) Oral daily  busPIRone 10 milliGRAM(s) Oral two times a day  calcitriol   Capsule 0.25 MICROGram(s) Oral daily  cholecalciferol 1000 Unit(s) Oral daily  diVALproex  milliGRAM(s) Oral <User Schedule>  ferrous    sulfate 325 milliGRAM(s) Oral daily  finasteride 5 milliGRAM(s) Oral daily  folic acid 1 milliGRAM(s) Oral daily  furosemide   Injectable 40 milliGRAM(s) IV Push daily  heparin   Injectable 5000 Unit(s) SubCutaneous every 8 hours  levothyroxine 125 MICROGram(s) Oral daily  lidocaine   4% Patch 1 Patch Transdermal daily  mupirocin 2% Ointment 1 Application(s) Both Nostrils every 12 hours  nicotine -  14 mG/24Hr(s) Patch 1 patch Transdermal daily  pantoprazole    Tablet 40 milliGRAM(s) Oral before breakfast  polyethylene glycol 3350 17 Gram(s) Oral daily  risperiDONE   Tablet 2 milliGRAM(s) Oral <User Schedule>  senna 2 Tablet(s) Oral at bedtime  sevelamer carbonate 800 milliGRAM(s) Oral three times a day with meals  simvastatin 20 milliGRAM(s) Oral at bedtime  tamsulosin 0.8 milliGRAM(s) Oral at bedtime    MEDICATIONS  (PRN):  acetaminophen   Tablet .. 1000 milliGRAM(s) Oral every 8 hours PRN Moderate Pain (4 - 6)      Vital Signs Last 24 Hrs  T(C): 36.6 (04 Sep 2021 04:57), Max: 37.4 (03 Sep 2021 20:41)  T(F): 97.8 (04 Sep 2021 04:57), Max: 99.4 (03 Sep 2021 20:41)  HR: 84 (04 Sep 2021 08:45) (77 - 88)  BP: 109/67 (04 Sep 2021 04:57) (108/56 - 131/72)  BP(mean): 67 (03 Sep 2021 13:27) (67 - 88)  RR: 17 (04 Sep 2021 04:57) (16 - 18)  SpO2: 94% (04 Sep 2021 08:45) (92% - 95%)    LABS:                          10.6   6.54  )-----------( 214      ( 04 Sep 2021 08:41 )             34.4     09-04    141  |  106  |  59<H>  ----------------------------<  90  4.3   |  29  |  3.38<H>    Ca    8.9      04 Sep 2021 08:41  Phos  4.2     09-04  Mg     2.2     09-04    TPro  7.0  /  Alb  2.5<L>  /  TBili  0.3  /  DBili  x   /  AST  9<L>  /  ALT  11  /  AlkPhos  76  09-04      LIVER FUNCTIONS - ( 04 Sep 2021 08:41 )  Alb: 2.5 g/dL / Pro: 7.0 g/dL / ALK PHOS: 76 U/L / ALT: 11 U/L DA / AST: 9 U/L / GGT: x             CAPILLARY BLOOD GLUCOSE      POCT Blood Glucose.: 95 mg/dL (04 Sep 2021 05:54)  POCT Blood Glucose.: 116 mg/dL (03 Sep 2021 23:56)  POCT Blood Glucose.: 122 mg/dL (03 Sep 2021 17:02)  POCT Blood Glucose.: 112 mg/dL (03 Sep 2021 12:51)      Radiology:    Drug Screen:        REVIEW OF SYSTEMS:  CONSTITUTIONAL: No fever or fatigue  RESPIRATORY: No cough, wheezing, chills or hemoptysis; No shortness of breath  CARDIOVASCULAR: No chest pain, palpitations, dizziness, or leg swelling  GASTROINTESTINAL: No abdominal or epigastric pain. No nausea, vomiting; No diarrhea or constipation.  + obese  GENITOURINARY: No dysuria, frequency, hematuria, retention or incontinence  MUSCULOSKELETAL: + chronic LE pain  NEURO: No headaches, No numbness/tingling b/l LE, No weakness  PSYCHIATRIC: No depression, anxiety, mood swings, or difficulty sleeping    PHYSICAL EXAM:  GENERAL:  Alert & Oriented X3, NAD, Good concentration  CHEST/LUNG: decreased breath sounds   HEART: Regular rate and rhythm; No murmurs, rubs, or gallops  ABDOMEN: distended, nontender   EXTREMITIES:  2+ Peripheral Pulses, No cyanosis, + le edema  MUSCULOSKELETAL: + decreased rom +pain - legs    SKIN: No rashes or lesions    Risk factors associated with adverse outcomes related to opioid treatment  [ ]  Concurrent benzodiazepine use  [ ]  History/ Active substance use or alcohol use disorder  [ ] Psychiatric co-morbidity  [ ] Sleep apnea  x[ ] COPD  [ ] BMI> 35  [ ] Liver dysfunction  [x ] Renal dysfunction  [ ] CHF  [ ] Smoker  [x ]  Age > 60 years    [ x]  [place]  Reviewed and Copied to Chart. See below.    Plan of care and goal oriented pain management treatment options were discussed with patient and /or primary care giver; all questions and concerns were addressed and care was aligned with patient's wishes.    Educated patient on goal oriented pain management treatment options     09-04-21 @ 11:29

## 2021-09-05 LAB
ALBUMIN SERPL ELPH-MCNC: 2.6 G/DL — LOW (ref 3.5–5)
ALP SERPL-CCNC: 72 U/L — SIGNIFICANT CHANGE UP (ref 40–120)
ALT FLD-CCNC: 10 U/L DA — SIGNIFICANT CHANGE UP (ref 10–60)
ANION GAP SERPL CALC-SCNC: 4 MMOL/L — LOW (ref 5–17)
AST SERPL-CCNC: 8 U/L — LOW (ref 10–40)
BILIRUB SERPL-MCNC: 0.3 MG/DL — SIGNIFICANT CHANGE UP (ref 0.2–1.2)
BUN SERPL-MCNC: 69 MG/DL — HIGH (ref 7–18)
CALCIUM SERPL-MCNC: 9.3 MG/DL — SIGNIFICANT CHANGE UP (ref 8.4–10.5)
CHLORIDE SERPL-SCNC: 107 MMOL/L — SIGNIFICANT CHANGE UP (ref 96–108)
CO2 SERPL-SCNC: 31 MMOL/L — SIGNIFICANT CHANGE UP (ref 22–31)
CREAT SERPL-MCNC: 3.54 MG/DL — HIGH (ref 0.5–1.3)
GLUCOSE SERPL-MCNC: 148 MG/DL — HIGH (ref 70–99)
HCT VFR BLD CALC: 32.6 % — LOW (ref 39–50)
HGB BLD-MCNC: 10.5 G/DL — LOW (ref 13–17)
MAGNESIUM SERPL-MCNC: 2.3 MG/DL — SIGNIFICANT CHANGE UP (ref 1.6–2.6)
MCHC RBC-ENTMCNC: 32.2 GM/DL — SIGNIFICANT CHANGE UP (ref 32–36)
MCHC RBC-ENTMCNC: 32.3 PG — SIGNIFICANT CHANGE UP (ref 27–34)
MCV RBC AUTO: 100.3 FL — HIGH (ref 80–100)
NRBC # BLD: 0 /100 WBCS — SIGNIFICANT CHANGE UP (ref 0–0)
PHOSPHATE SERPL-MCNC: 3.6 MG/DL — SIGNIFICANT CHANGE UP (ref 2.5–4.5)
PLATELET # BLD AUTO: 202 K/UL — SIGNIFICANT CHANGE UP (ref 150–400)
POTASSIUM SERPL-MCNC: 4.1 MMOL/L — SIGNIFICANT CHANGE UP (ref 3.5–5.3)
POTASSIUM SERPL-SCNC: 4.1 MMOL/L — SIGNIFICANT CHANGE UP (ref 3.5–5.3)
PROT SERPL-MCNC: 6.8 G/DL — SIGNIFICANT CHANGE UP (ref 6–8.3)
RBC # BLD: 3.25 M/UL — LOW (ref 4.2–5.8)
RBC # FLD: 13.5 % — SIGNIFICANT CHANGE UP (ref 10.3–14.5)
SODIUM SERPL-SCNC: 142 MMOL/L — SIGNIFICANT CHANGE UP (ref 135–145)
WBC # BLD: 6.78 K/UL — SIGNIFICANT CHANGE UP (ref 3.8–10.5)
WBC # FLD AUTO: 6.78 K/UL — SIGNIFICANT CHANGE UP (ref 3.8–10.5)

## 2021-09-05 RX ADMIN — HEPARIN SODIUM 5000 UNIT(S): 5000 INJECTION INTRAVENOUS; SUBCUTANEOUS at 05:26

## 2021-09-05 RX ADMIN — HEPARIN SODIUM 5000 UNIT(S): 5000 INJECTION INTRAVENOUS; SUBCUTANEOUS at 14:10

## 2021-09-05 RX ADMIN — CALCITRIOL 0.25 MICROGRAM(S): 0.5 CAPSULE ORAL at 11:49

## 2021-09-05 RX ADMIN — Medication 81 MILLIGRAM(S): at 11:47

## 2021-09-05 RX ADMIN — ALBUTEROL 2 PUFF(S): 90 AEROSOL, METERED ORAL at 08:16

## 2021-09-05 RX ADMIN — ALBUTEROL 2 PUFF(S): 90 AEROSOL, METERED ORAL at 11:49

## 2021-09-05 RX ADMIN — BUPROPION HYDROCHLORIDE 150 MILLIGRAM(S): 150 TABLET, EXTENDED RELEASE ORAL at 11:48

## 2021-09-05 RX ADMIN — SEVELAMER CARBONATE 800 MILLIGRAM(S): 2400 POWDER, FOR SUSPENSION ORAL at 08:16

## 2021-09-05 RX ADMIN — Medication 1 PATCH: at 11:49

## 2021-09-05 RX ADMIN — Medication 325 MILLIGRAM(S): at 11:47

## 2021-09-05 RX ADMIN — Medication 1 MILLIGRAM(S): at 11:47

## 2021-09-05 RX ADMIN — Medication 1 DROP(S): at 21:31

## 2021-09-05 RX ADMIN — Medication 1 PATCH: at 07:29

## 2021-09-05 RX ADMIN — PANTOPRAZOLE SODIUM 40 MILLIGRAM(S): 20 TABLET, DELAYED RELEASE ORAL at 06:08

## 2021-09-05 RX ADMIN — Medication 40 MILLIGRAM(S): at 05:26

## 2021-09-05 RX ADMIN — BUDESONIDE AND FORMOTEROL FUMARATE DIHYDRATE 2 PUFF(S): 160; 4.5 AEROSOL RESPIRATORY (INHALATION) at 21:29

## 2021-09-05 RX ADMIN — Medication 1000 UNIT(S): at 11:47

## 2021-09-05 RX ADMIN — RISPERIDONE 2 MILLIGRAM(S): 4 TABLET ORAL at 21:29

## 2021-09-05 RX ADMIN — MUPIROCIN 1 APPLICATION(S): 20 OINTMENT TOPICAL at 17:54

## 2021-09-05 RX ADMIN — Medication 125 MICROGRAM(S): at 05:26

## 2021-09-05 RX ADMIN — Medication 10 MILLIGRAM(S): at 17:54

## 2021-09-05 RX ADMIN — LIDOCAINE 1 PATCH: 4 CREAM TOPICAL at 23:36

## 2021-09-05 RX ADMIN — SEVELAMER CARBONATE 800 MILLIGRAM(S): 2400 POWDER, FOR SUSPENSION ORAL at 11:47

## 2021-09-05 RX ADMIN — Medication 10 MILLIGRAM(S): at 05:26

## 2021-09-05 RX ADMIN — ALBUTEROL 2 PUFF(S): 90 AEROSOL, METERED ORAL at 16:54

## 2021-09-05 RX ADMIN — FINASTERIDE 5 MILLIGRAM(S): 5 TABLET, FILM COATED ORAL at 11:48

## 2021-09-05 RX ADMIN — LIDOCAINE 1 PATCH: 4 CREAM TOPICAL at 11:48

## 2021-09-05 RX ADMIN — HEPARIN SODIUM 5000 UNIT(S): 5000 INJECTION INTRAVENOUS; SUBCUTANEOUS at 21:30

## 2021-09-05 RX ADMIN — SIMVASTATIN 20 MILLIGRAM(S): 20 TABLET, FILM COATED ORAL at 21:30

## 2021-09-05 RX ADMIN — SENNA PLUS 2 TABLET(S): 8.6 TABLET ORAL at 21:30

## 2021-09-05 RX ADMIN — SEVELAMER CARBONATE 800 MILLIGRAM(S): 2400 POWDER, FOR SUSPENSION ORAL at 17:53

## 2021-09-05 RX ADMIN — DIVALPROEX SODIUM 500 MILLIGRAM(S): 500 TABLET, DELAYED RELEASE ORAL at 16:11

## 2021-09-05 RX ADMIN — BUDESONIDE AND FORMOTEROL FUMARATE DIHYDRATE 2 PUFF(S): 160; 4.5 AEROSOL RESPIRATORY (INHALATION) at 09:55

## 2021-09-05 RX ADMIN — POLYETHYLENE GLYCOL 3350 17 GRAM(S): 17 POWDER, FOR SOLUTION ORAL at 11:48

## 2021-09-05 RX ADMIN — MUPIROCIN 1 APPLICATION(S): 20 OINTMENT TOPICAL at 05:26

## 2021-09-05 RX ADMIN — TAMSULOSIN HYDROCHLORIDE 0.8 MILLIGRAM(S): 0.4 CAPSULE ORAL at 21:30

## 2021-09-05 RX ADMIN — Medication 1 PATCH: at 19:11

## 2021-09-05 RX ADMIN — Medication 1 PATCH: at 11:45

## 2021-09-05 RX ADMIN — LIDOCAINE 1 PATCH: 4 CREAM TOPICAL at 21:16

## 2021-09-05 RX ADMIN — DIVALPROEX SODIUM 500 MILLIGRAM(S): 500 TABLET, DELAYED RELEASE ORAL at 08:16

## 2021-09-05 NOTE — PROGRESS NOTE ADULT - SUBJECTIVE AND OBJECTIVE BOX
C A R D I O L O G Y  *********************    DATE OF SERVICE: 9-5-21    HISTORY OF PRESENT ILLNESS: HPI:  61 year old man with morbid [place] from [facility] [place] amb[place] w/ walker has past medical hx of COPD not on O2 at home, hypothyroidism, anemia, left DVT, CKD, BPH, HLD, bipolar disorder, ambulatory dysfunction, lymphedema was BIBEMS as patient was complaining of worsening lower extremity weakness, lightheaded and overall not feeling well for one day. Of note, patient with slurred speech at baseline since childhood and known ambulatory dysfunction reason why he is on IRA. Patient denies chest pain, palpitations, sob or other symptoms.  No CP, No LOC    Transferred out of ICU to floor.  Hypercarbic respiratory failure due to opiate overuse has resolved.    Patient states no complaints, feels well, awaiting rehab then back to assisted living.    acetaminophen   Tablet .. 1000 milliGRAM(s) Oral every 8 hours PRN  ALBUTerol    90 MICROgram(s) HFA Inhaler 2 Puff(s) Inhalation <User Schedule>  artificial  tears Solution 1 Drop(s) Right EYE every 6 hours PRN  aspirin enteric coated 81 milliGRAM(s) Oral daily  budesonide 160 MICROgram(s)/formoterol 4.5 MICROgram(s) Inhaler 2 Puff(s) Inhalation two times a day  buPROPion XL (24-Hour) . 150 milliGRAM(s) Oral daily  busPIRone 10 milliGRAM(s) Oral two times a day  calcitriol   Capsule 0.25 MICROGram(s) Oral daily  cholecalciferol 1000 Unit(s) Oral daily  diVALproex  milliGRAM(s) Oral <User Schedule>  ferrous    sulfate 325 milliGRAM(s) Oral daily  finasteride 5 milliGRAM(s) Oral daily  folic acid 1 milliGRAM(s) Oral daily  furosemide   Injectable 40 milliGRAM(s) IV Push daily  heparin   Injectable 5000 Unit(s) SubCutaneous every 8 hours  levothyroxine 125 MICROGram(s) Oral daily  lidocaine   4% Patch 1 Patch Transdermal daily  mupirocin 2% Ointment 1 Application(s) Both Nostrils every 12 hours  nicotine -  14 mG/24Hr(s) Patch 1 patch Transdermal daily  pantoprazole    Tablet 40 milliGRAM(s) Oral before breakfast  polyethylene glycol 3350 17 Gram(s) Oral daily  risperiDONE   Tablet 2 milliGRAM(s) Oral <User Schedule>  senna 2 Tablet(s) Oral at bedtime  sevelamer carbonate 800 milliGRAM(s) Oral three times a day with meals  simvastatin 20 milliGRAM(s) Oral at bedtime  tamsulosin 0.8 milliGRAM(s) Oral at bedtime                            10.5   6.78  )-----------( 202      ( 05 Sep 2021 10:06 )             32.6       09-05    142  |  107  |  69<H>  ----------------------------<  148<H>  4.1   |  31  |  3.54<H>    Ca    9.3      05 Sep 2021 10:06  Phos  3.6     09-05  Mg     2.3     09-05    TPro  6.8  /  Alb  2.6<L>  /  TBili  0.3  /  DBili  x   /  AST  8<L>  /  ALT  10  /  AlkPhos  72  09-05    T(C): 36.4 (09-05-21 @ 05:17), Max: 36.9 (09-04-21 @ 12:54)  HR: 78 (09-05-21 @ 05:17) (76 - 81)  BP: 127/66 (09-05-21 @ 05:17) (117/49 - 127/66)  RR: 19 (09-05-21 @ 05:17) (18 - 20)  SpO2: 93% (09-05-21 @ 05:17) (93% - 96%)  Wt(kg): --    I&O's Summary    04 Sep 2021 07:01  -  05 Sep 2021 07:00  --------------------------------------------------------  IN: 0 mL / OUT: 5600 mL / NET: -5600 mL    Gen: pleasant overweight white male in no acute distress, cooperative.  HEENT:  (-)icterus (-)pallor  CV: N S1 S2 1/6 ABIGAIL (+)2 Pulses B/l  Resp:  Clear to ausculatation B/L, normal effort  GI: (+) BS Soft, NT, ND  Lymph:  severe  edema, (-)obvious lymphadenopathy  Skin: Warm to touch, Normal turgor  Psych: Appropriate mood and affect      ASSESSMENT/PLAN: 	61y Male morbid obesity from [facility] [place] ambulates w/ walker has past medical hx of COPD not on O2 at home, hypothyroidism, anemia, left DVT, CKD, BPH, HLD, bipolar disorder, ambulatory dysfunction, lymphedema was BIBEMS as patient was complaining of worsening lower extremity weakness and  lightheadedness    - Recovering well after Narcan for opiate toxicity.  Cont tele  - Echo is overall reassuring.  - Work toward discharge back to assisted living.  No opposition to discharge/xfer when available.  - No angina or CHF  - Clinical presentation is sequale of a primary pulmonary process    Munir Bass M.D.  Cardiac Electrophysiology  [phone]

## 2021-09-05 NOTE — PROGRESS NOTE ADULT - SUBJECTIVE AND OBJECTIVE BOX
`Patient is a 61y old  Male who presents with a chief complaint of SHEYLA ON CKD, DEMAND ISCHEMIA, AMBULATORY DYSFUNCTION (05 Sep 2021 10:54)    PATIENT IS SEEN AND EXAMINED IN MEDICAL FLOOR.  DORIST [    ]    ALYSIA [   ]      GT [   ]    ALLERGIES:  No Known Allergies      Daily     Daily Weight in k.8 (05 Sep 2021 05:17)    VITALS:    Vital Signs Last 24 Hrs  T(C): 36.4 (05 Sep 2021 05:17), Max: 36.9 (04 Sep 2021 12:54)  T(F): 97.6 (05 Sep 2021 05:17), Max: 98.4 (04 Sep 2021 12:54)  HR: 78 (05 Sep 2021 05:17) (76 - 81)  BP: 127/66 (05 Sep 2021 05:17) (117/49 - 127/66)  BP(mean): --  RR: 19 (05 Sep 2021 05:17) (18 - 20)  SpO2: 93% (05 Sep 2021 05:17) (93% - 96%)    LABS:    CBC Full  -  ( 05 Sep 2021 10:06 )  WBC Count : 6.78 K/uL  RBC Count : 3.25 M/uL  Hemoglobin : 10.5 g/dL  Hematocrit : 32.6 %  Platelet Count - Automated : 202 K/uL  Mean Cell Volume : 100.3 fl  Mean Cell Hemoglobin : 32.3 pg  Mean Cell Hemoglobin Concentration : 32.2 gm/dL  Auto Neutrophil # : x  Auto Lymphocyte # : x  Auto Monocyte # : x  Auto Eosinophil # : x  Auto Basophil # : x  Auto Neutrophil % : x  Auto Lymphocyte % : x  Auto Monocyte % : x  Auto Eosinophil % : x  Auto Basophil % : x          142  |  107  |  69<H>  ----------------------------<  148<H>  4.1   |  31  |  3.54<H>    Ca    9.3      05 Sep 2021 10:06  Phos  3.6     09-  Mg     2.3     09-    TPro  6.8  /  Alb  2.6<L>  /  TBili  0.3  /  DBili  x   /  AST  8<L>  /  ALT  10  /  AlkPhos  72  09-05    CAPILLARY BLOOD GLUCOSE      POCT Blood Glucose.: 100 mg/dL (05 Sep 2021 07:43)  POCT Blood Glucose.: 104 mg/dL (05 Sep 2021 05:53)  POCT Blood Glucose.: 112 mg/dL (04 Sep 2021 23:33)  POCT Blood Glucose.: 110 mg/dL (04 Sep 2021 16:51)  POCT Blood Glucose.: 104 mg/dL (04 Sep 2021 12:05)        LIVER FUNCTIONS - ( 05 Sep 2021 10:06 )  Alb: 2.6 g/dL / Pro: 6.8 g/dL / ALK PHOS: 72 U/L / ALT: 10 U/L DA / AST: 8 U/L / GGT: x           Creatinine Trend: 3.54<--, 3.38<--, 3.65<--, 3.62<--, 4.31<--, 4.01<--  I&O's Summary    04 Sep 2021 07:01  -  05 Sep 2021 07:00  --------------------------------------------------------  IN: 0 mL / OUT: 5600 mL / NET: -5600 mL            Clean Catch Clean Catch (Midstream)  08-30 @ 21:13   <10,000 CFU/mL Normal Urogenital Ivelisse  --  --          MEDICATIONS:    MEDICATIONS  (STANDING):  ALBUTerol    90 MICROgram(s) HFA Inhaler 2 Puff(s) Inhalation <User Schedule>  aspirin enteric coated 81 milliGRAM(s) Oral daily  budesonide 160 MICROgram(s)/formoterol 4.5 MICROgram(s) Inhaler 2 Puff(s) Inhalation two times a day  buPROPion XL (24-Hour) . 150 milliGRAM(s) Oral daily  busPIRone 10 milliGRAM(s) Oral two times a day  calcitriol   Capsule 0.25 MICROGram(s) Oral daily  cholecalciferol 1000 Unit(s) Oral daily  diVALproex  milliGRAM(s) Oral <User Schedule>  ferrous    sulfate 325 milliGRAM(s) Oral daily  finasteride 5 milliGRAM(s) Oral daily  folic acid 1 milliGRAM(s) Oral daily  furosemide   Injectable 40 milliGRAM(s) IV Push daily  heparin   Injectable 5000 Unit(s) SubCutaneous every 8 hours  levothyroxine 125 MICROGram(s) Oral daily  lidocaine   4% Patch 1 Patch Transdermal daily  mupirocin 2% Ointment 1 Application(s) Both Nostrils every 12 hours  nicotine -  14 mG/24Hr(s) Patch 1 patch Transdermal daily  pantoprazole    Tablet 40 milliGRAM(s) Oral before breakfast  polyethylene glycol 3350 17 Gram(s) Oral daily  risperiDONE   Tablet 2 milliGRAM(s) Oral <User Schedule>  senna 2 Tablet(s) Oral at bedtime  sevelamer carbonate 800 milliGRAM(s) Oral three times a day with meals  simvastatin 20 milliGRAM(s) Oral at bedtime  tamsulosin 0.8 milliGRAM(s) Oral at bedtime      MEDICATIONS  (PRN):  acetaminophen   Tablet .. 1000 milliGRAM(s) Oral every 8 hours PRN Moderate Pain (4 - 6)  artificial  tears Solution 1 Drop(s) Right EYE every 6 hours PRN Dry Eyes      REVIEW OF SYSTEMS:                           ALL ROS DONE [ X   ]    CONSTITUTIONAL:  LETHARGIC [   ], FEVER [   ], UNRESPONSIVE [   ]  CVS:  CP  [   ], SOB, [   ], PALPITATIONS [   ], DIZZYNESS [   ]  RS: COUGH [   ], SPUTUM [   ]  GI: ABDOMINAL PAIN [   ], NAUSEA [   ], VOMITINGS [   ], DIARRHEA [   ], CONSTIPATION [   ]  :  DYSURIA [   ], NOCTURIA [   ], INCREASED FREQUENCY [   ], DRIBLING [   ],  SKELETAL: PAINFUL JOINTS [   ], SWOLLEN JOINTS [   ], NECK ACHE [   ], LOW BACK ACHE [   ],  SKIN : ULCERS [   ], RASH [   ], ITCHING [   ]  CNS: HEAD ACHE [   ], DOUBLE VISION [   ], BLURRED VISION [   ], AMS / CONFUSION [   ], SEIZURES [   ], WEAKNESS [   ],TINGLING / NUMBNESS [   ]    PHYSICAL EXAMINATION:  GENERAL APPEARANCE: NO DISTRESS  HEENT:  NO PALLOR, NO  JVD,  NO   NODES, NECK SUPPLE  CVS: S1 +, S2 +,   RS: AEEB,  OCCASIONAL  RALES +,   NO RONCHI  ABD: SOFT, NT, NO, BS +  EXT: PE ++  SKIN: WARM,   SKELETAL:  ROM ACCEPTABLE  CNS:  AAO X 2     RADIOLOGY :    RADIOLOGY REVIEWED    ASSESSMENT :     Weakness    No pertinent past medical history    Hypothyroid    Hyperlipidemia    Schizophrenia    Schizoaffective disorder    Ambulatory dysfunction    Anemia    History of BPH    CKD (chronic kidney disease)    Chronic obstructive pulmonary disease (COPD)    Bipolar disorder    Bipolar disorder    No significant past surgical history        PLAN:  HPI:  61 year old man with morbid obesity from [facility] [place] ambulates w/ walker has past medical hx of COPD not on O2 at home, hypothyroidism, anemia, left DVT, CKD, BPH, HLD, bipolar disorder, ambulatory dysfunction, lymphedema was BIBEMS as patient was complaining of worsening lower extremity weakness, lightheaded and overall not feeling well for one day. Of note, patient with slurred speech at baseline since childhood and known ambulatory dysfunction reason why he is on IRA. Patient denies chest pain, palpitations, sob or other symptoms.      [facility] full code    (30 Aug 2021 17:02)    # PT EVAL RECOMMENDED TG - D/C PLAN TO [facility]    # RRT FOR AMS [] FOUND TO HAVE ACUTE HYPERCAPNIA - GIVEN NARCAN [PATIENT'S RESPONSIVENESS IMPROVED], NARCOTICS DISCONTINUED, TRANSFERRED TO ICU ON BIPAP  - PAIN MGMT CONSULT  # HYPOGLYCEMIA S/T POOR PO INTAKE - RESOLVED  # ELEVATED TROPONIN - TREND, ECHOCARDIOGRAM - W/ MILD PULM HTN, CARDIOLOGY CONSULT  # SHEYLA ON CKD - IMPROVING - FAILED TOV W/ HATFIELD - MONITOR CR, AVOID NEPHROTOXIC AGENTS - ? ATN  - MONITOR IS AND OS  - INCREASED FLOMAX, CONTINUE FINASTERIDE  - HOLD DIURETICS  - NEPHROLOGY CONSULT  # COPD  # HYPOTHYROIDISM  # MORBID OBESITY  # CHRONIC VENOUS INSUFFICIENCY, HX OF LEFT DVT  # F/U PT EVAL  # GI AND DVT PPX

## 2021-09-05 NOTE — PROGRESS NOTE ADULT - PROBLEM SELECTOR PLAN 1
CXR noted above  SPO2 93% on 2L via n/c, saturaing well with NC  Pt takes Albuterol and Symbicort at home  Continue home regimen   Monitor oxygen saturations  using few hrs of bipap at [facility], not requiring through out night

## 2021-09-05 NOTE — PROGRESS NOTE ADULT - SUBJECTIVE AND OBJECTIVE BOX
GAYLA PEARCE    SCU progress note    INTERVAL HPI/OVERNIGHT EVENTS: no new complaints. Pt comfortable in bed. stated, "I don't need the machine". denies any shortness of breath.    DNR [ ]   DNI  [  ] FULL CODE    Covid - 19 PCR: negative since 8/30/21    The 4Ms    What Matters Most: see GOC  Age appropriate Medications/Screen for High Risk Medication: Yes  Mentation: see CAM below  Mobility: defer to physical exam    The Confusion Assessment Method (CAM) Diagnostic Algorithm     1: Acute Onset or Fluctuating Course  - Is there evidence of an acute change in mental status from the patient’s baseline? Did the (abnormal) behavior  fluctuate during the day, that is, tend to come and go, or increase and decrease in severity?  [ ] YES [ x] NO     2: Inattention  - Did the patient have difficulty focusing attention, being easily distractible, or having difficulty keeping track of what was being said?  [ ] YES [x ] NO     3: Disorganized thinking  -Was the patient’s thinking disorganized or incoherent, such as rambling or irrelevant conversation, unclear or illogical flow of ideas, or unpredictable switching from subject to subject?  [ ] YES [ x] NO    4: Altered Level of consciousness?  [ ] YES [ x] NO    The diagnosis of delirium by CAM requires the presence of features 1 and 2 and either 3 or 4.    PRESSORS: [ ] YES [x ] NO    Cardiovascular:  Heart Failure  Acute   Acute on Chronic  Chronic       furosemide   Injectable 40 milliGRAM(s) IV Push daily  tamsulosin 0.8 milliGRAM(s) Oral at bedtime    Pulmonary:  ALBUTerol    90 MICROgram(s) HFA Inhaler 2 Puff(s) Inhalation <User Schedule>  budesonide 160 MICROgram(s)/formoterol 4.5 MICROgram(s) Inhaler 2 Puff(s) Inhalation two times a day    Hematalogic:  aspirin enteric coated 81 milliGRAM(s) Oral daily  heparin   Injectable 5000 Unit(s) SubCutaneous every 8 hours    Other:  acetaminophen   Tablet .. 1000 milliGRAM(s) Oral every 8 hours PRN  artificial  tears Solution 1 Drop(s) Right EYE every 6 hours PRN  buPROPion XL (24-Hour) . 150 milliGRAM(s) Oral daily  busPIRone 10 milliGRAM(s) Oral two times a day  calcitriol   Capsule 0.25 MICROGram(s) Oral daily  cholecalciferol 1000 Unit(s) Oral daily  diVALproex  milliGRAM(s) Oral <User Schedule>  ferrous    sulfate 325 milliGRAM(s) Oral daily  finasteride 5 milliGRAM(s) Oral daily  folic acid 1 milliGRAM(s) Oral daily  levothyroxine 125 MICROGram(s) Oral daily  lidocaine   4% Patch 1 Patch Transdermal daily  mupirocin 2% Ointment 1 Application(s) Both Nostrils every 12 hours  nicotine -  14 mG/24Hr(s) Patch 1 patch Transdermal daily  pantoprazole    Tablet 40 milliGRAM(s) Oral before breakfast  polyethylene glycol 3350 17 Gram(s) Oral daily  risperiDONE   Tablet 2 milliGRAM(s) Oral <User Schedule>  senna 2 Tablet(s) Oral at bedtime  sevelamer carbonate 800 milliGRAM(s) Oral three times a day with meals  simvastatin 20 milliGRAM(s) Oral at bedtime    acetaminophen   Tablet .. 1000 milliGRAM(s) Oral every 8 hours PRN  ALBUTerol    90 MICROgram(s) HFA Inhaler 2 Puff(s) Inhalation <User Schedule>  artificial  tears Solution 1 Drop(s) Right EYE every 6 hours PRN  aspirin enteric coated 81 milliGRAM(s) Oral daily  budesonide 160 MICROgram(s)/formoterol 4.5 MICROgram(s) Inhaler 2 Puff(s) Inhalation two times a day  buPROPion XL (24-Hour) . 150 milliGRAM(s) Oral daily  busPIRone 10 milliGRAM(s) Oral two times a day  calcitriol   Capsule 0.25 MICROGram(s) Oral daily  cholecalciferol 1000 Unit(s) Oral daily  diVALproex  milliGRAM(s) Oral <User Schedule>  ferrous    sulfate 325 milliGRAM(s) Oral daily  finasteride 5 milliGRAM(s) Oral daily  folic acid 1 milliGRAM(s) Oral daily  furosemide   Injectable 40 milliGRAM(s) IV Push daily  heparin   Injectable 5000 Unit(s) SubCutaneous every 8 hours  levothyroxine 125 MICROGram(s) Oral daily  lidocaine   4% Patch 1 Patch Transdermal daily  mupirocin 2% Ointment 1 Application(s) Both Nostrils every 12 hours  nicotine -  14 mG/24Hr(s) Patch 1 patch Transdermal daily  pantoprazole    Tablet 40 milliGRAM(s) Oral before breakfast  polyethylene glycol 3350 17 Gram(s) Oral daily  risperiDONE   Tablet 2 milliGRAM(s) Oral <User Schedule>  senna 2 Tablet(s) Oral at bedtime  sevelamer carbonate 800 milliGRAM(s) Oral three times a day with meals  simvastatin 20 milliGRAM(s) Oral at bedtime  tamsulosin 0.8 milliGRAM(s) Oral at bedtime    Drug Dosing Weight  Height (cm): 172.7 (30 Aug 2021 08:52)  Weight (kg): 135.1 (31 Aug 2021 22:45)  BMI (kg/m2): 45.3 (31 Aug 2021 22:45)  BSA (m2): 2.42 (31 Aug 2021 22:45)    CENTRAL LINE: [ ] YES [x ] NO  LOCATION:   DATE INSERTED:  REMOVE: [ ] YES [ ] NO  EXPLAIN:    HATFIELD: [x ] YES [ ] NO    DATE INSERTED: 9/4  REMOVE:  [ ] YES [x ] NO  EXPLAIN: Pt failed TOV    PAST MEDICAL & SURGICAL HISTORY:  Hypothyroid    Hyperlipidemia    Ambulatory dysfunction    Anemia    History of BPH    CKD (chronic kidney disease)    Chronic obstructive pulmonary disease (COPD)    Bipolar disorder    09-04 @ 07:01  -  09-05 @ 07:00  --------------------------------------------------------  IN: 0 mL / OUT: 5600 mL / NET: -5600 mL    PHYSICAL EXAM:    GENERAL: NAD, well-groomed, morbidly obesed  HEAD:  Atraumatic, Normocephalic  EYES: EOMI, PERRLA, conjunctiva and sclera clear  ENMT: No tonsillar erythema, exudates, or enlargement; Moist mucous membranes, Good dentition, No lesions  NECK: Supple, No JVD, Normal thyroid  NERVOUS SYSTEM:  Alert & Oriented X3, Good concentration; Motor Strength 5/5 B/L upper and generalized weakness  lower extremities; DTRs 2+ intact and symmetric  CHEST/LUNG: Clear to percussion bilaterally; No rales, rhonchi, wheezing, or rubs  HEART: Regular rate and rhythm; No murmurs, rubs, or gallops  ABDOMEN: Soft, Nontender, Nondistended; Bowel sounds present  EXTREMITIES:  2+ Peripheral Pulses, erythema and generalized edema BLE  LYMPH: BLE lymphadenopathy noted  SKIN: No rashes or lesions      LABS:  CBC Full  -  ( 04 Sep 2021 08:41 )  WBC Count : 6.54 K/uL  RBC Count : 3.40 M/uL  Hemoglobin : 10.6 g/dL  Hematocrit : 34.4 %  Platelet Count - Automated : 214 K/uL  Mean Cell Volume : 101.2 fl  Mean Cell Hemoglobin : 31.2 pg  Mean Cell Hemoglobin Concentration : 30.8 gm/dL  Auto Neutrophil # : x  Auto Lymphocyte # : x  Auto Monocyte # : x  Auto Eosinophil # : x  Auto Basophil # : x  Auto Neutrophil % : x  Auto Lymphocyte % : x  Auto Monocyte % : x  Auto Eosinophil % : x  Auto Basophil % : x    09-04    141  |  106  |  59<H>  ----------------------------<  90  4.3   |  29  |  3.38<H>    Ca    8.9      04 Sep 2021 08:41  Phos  4.2     09-04  Mg     2.2     09-04    TPro  7.0  /  Alb  2.5<L>  /  TBili  0.3  /  DBili  x   /  AST  9<L>  /  ALT  11  /  AlkPhos  76  09-04    [  x]  DVT Prophylaxis  [  ]  Nutrition, Brand, Rate, Taking PO        Morbid Obesity BMI >/=40    RADIOLOGY & ADDITIONAL STUDIES:    < from: Xray Chest 1 View- PORTABLE-Routine (Xray Chest 1 View- PORTABLE-Routine in AM.) (09.01.21 @ 07:00) >  EXAM:  XR CHEST PORTABLE ROUTINE 1V                            PROCEDURE DATE:  09/01/2021          INTERPRETATION:  Chest one view    HISTORY: Pulmonary edema    COMPARISON STUDY: 8/30/2021    Frontal expiratory view of the chest shows the heart to be similar in size. The lungs show mild bilateral atelectasis. Pulmonary vascularity is within normal limits and there is no evidence of pneumothorax nor pleural effusion. Elevated left hemidiaphragm is again noted.    IMPRESSION:  Atelectasis. No evidence of pulmonary edema.        Thank you for the courtesy of this referral.    --- End of Report ---      ROYCE PALACIOS MD; Attending Interventional Radiologist  This document has been electronically signed. Sep  1 2021  3:15PM    < end of copied text >    Goals of Care Discussion with Family/Proxy/Other   - see note from/family meeting set up for...

## 2021-09-05 NOTE — PROGRESS NOTE ADULT - ASSESSMENT
61 year old man with morbid obesity from [facility] [place] ambulates w/ walker has past medical hx of COPD not on O2 at home, hypothyroidism, anemia, left DVT, CKD, BPH, HLD, bipolar disorder, initially admitted for increased lethargy is admitted for hypercapnic respiratroy failure requiring BIPAP.  On 8/31, pt was noted to be increasingly lethargic and drowsy and was hypotensive as well. Rapid response team was called. Patient was only opening eyes to painful stimuli. Pinpoint pupils were noted. Chart review showed he received Dilaudid 4mg PO in the afternoon, Narcan 0.4mg was given and pt opened eyes.   ABG shows PCO2 of 100 for which pt was placed on BiPAP in ICU and improved. IV Lasix 40 mg added for euvolemic status. Pt was able to drink water and take pills 9/1, hence diet advanced to soft. Latest ABG 7.22/62/83/25 on 4 liters NC, hence patient is being downgraded to AI service.     9/2 - Failed TOV, daley reinserted  9/4 - PT rec TG, pending choices, will likely go to [facility] per pt

## 2021-09-05 NOTE — PROGRESS NOTE ADULT - ASSESSMENT
1. SHEYLA due to r/o ATN due to possible CRS  -scr slight increase but stable and close to baseline and stable electrolytes; continue lasix 40mg iv daily. TOV done.  -UA shows some proteinuria. -resend urine lytes (uosm, urine sodium, urine creatinine, chloride, potassium), spot protein to creatinine ratio  - renal sonogram shows no hydronephrosis with Right kidney 11.8cm and Left kidney 12.0cm with increase echogenicity   -Adjust meds to eGFR and avoid IV Gadolinium contrast,NSAIDs, and phosphate enema.  -Monitor I/O's daily.   -Monitor SMA daily.  2. CKD stage 4 most likely due to ischemic nephropathy  -baseline scr around 3.1mg/dL  -Keep patient euvolemic and renal diet  -Avoid Nephrotoxic Meds/ Agents such as (NSAIDs, IV contrast, Aminoglycosides such as gentamicin, -Gadolinium contrast, Phosphate containing enemas, etc..)  -Adjust Medications according to eGFR  3. Anemia:  -on iron tabs.   -F/u CBC daily  -transfuse if HB < 7.0.  3. Mineral Bone Disease:  -phos is elevated and on renvela 1 tab tid and Vitamin 25 OH is low and PTH intact is 330, on  calcitriol 0.25mcg daily.   4. Respiratory Acidosis: on bipap prn and echo noted. on iv lasix. cardiology consulted.     Discussed with patient in detail regarding the renal plan and care  Discussed the assessment and plan with Primary Team/Nurse

## 2021-09-05 NOTE — PROGRESS NOTE ADULT - SUBJECTIVE AND OBJECTIVE BOX
NEPHROLOGY MEDICAL CARE, [facility] - Dr. Taj Valenzuela/ Dr. Hazel Solis/ Dr. Cosmo Root/ Dr. Fang Gifford    Patient was seen and examined at bedside.    CC: patient is okay; NAD    Vital Signs Last 24 Hrs  T(C): 36.4 (05 Sep 2021 05:17), Max: 36.9 (04 Sep 2021 12:54)  T(F): 97.6 (05 Sep 2021 05:17), Max: 98.4 (04 Sep 2021 12:54)  HR: 78 (05 Sep 2021 05:17) (76 - 81)  BP: 127/66 (05 Sep 2021 05:17) (117/49 - 127/66)  BP(mean): --  RR: 19 (05 Sep 2021 05:17) (18 - 20)  SpO2: 93% (05 Sep 2021 05:17) (93% - 96%)    09-04 @ 07:01  -  09-05 @ 07:00  --------------------------------------------------------  IN: 0 mL / OUT: 5600 mL / NET: -5600 mL        PHYSICAL EXAM:  General: No acute respiratory distress; obese  Eyes: conjunctiva and sclera clear  ENMT: Atraumatic, Normocephalic, supple, No JVD present. Moist mucous membranes  Respiratory: b/l poor air entry; No rales, rhonchi, wheezing  Cardiovasular: S1S2+; no m/r/g  Gastrointestinal: Soft, Non-tender, Nondistended; Bowel sounds present  Neuro:  Awake, Alert & Oriented X3  Ext:  2+ pedal edema with chronic venous stasis; No Cyanosis  Skin: chronic vascular changes of the b/l legs.    MEDICATIONS:  MEDICATIONS  (STANDING):  ALBUTerol    90 MICROgram(s) HFA Inhaler 2 Puff(s) Inhalation <User Schedule>  aspirin enteric coated 81 milliGRAM(s) Oral daily  budesonide 160 MICROgram(s)/formoterol 4.5 MICROgram(s) Inhaler 2 Puff(s) Inhalation two times a day  buPROPion XL (24-Hour) . 150 milliGRAM(s) Oral daily  busPIRone 10 milliGRAM(s) Oral two times a day  calcitriol   Capsule 0.25 MICROGram(s) Oral daily  cholecalciferol 1000 Unit(s) Oral daily  diVALproex  milliGRAM(s) Oral <User Schedule>  ferrous    sulfate 325 milliGRAM(s) Oral daily  finasteride 5 milliGRAM(s) Oral daily  folic acid 1 milliGRAM(s) Oral daily  furosemide   Injectable 40 milliGRAM(s) IV Push daily  heparin   Injectable 5000 Unit(s) SubCutaneous every 8 hours  levothyroxine 125 MICROGram(s) Oral daily  lidocaine   4% Patch 1 Patch Transdermal daily  mupirocin 2% Ointment 1 Application(s) Both Nostrils every 12 hours  nicotine -  14 mG/24Hr(s) Patch 1 patch Transdermal daily  pantoprazole    Tablet 40 milliGRAM(s) Oral before breakfast  polyethylene glycol 3350 17 Gram(s) Oral daily  risperiDONE   Tablet 2 milliGRAM(s) Oral <User Schedule>  senna 2 Tablet(s) Oral at bedtime  sevelamer carbonate 800 milliGRAM(s) Oral three times a day with meals  simvastatin 20 milliGRAM(s) Oral at bedtime  tamsulosin 0.8 milliGRAM(s) Oral at bedtime    MEDICATIONS  (PRN):  acetaminophen   Tablet .. 1000 milliGRAM(s) Oral every 8 hours PRN Moderate Pain (4 - 6)  artificial  tears Solution 1 Drop(s) Right EYE every 6 hours PRN Dry Eyes          LABS:                        10.5   6.78  )-----------( 202      ( 05 Sep 2021 10:06 )             32.6     09-05    142  |  107  |  69<H>  ----------------------------<  148<H>  4.1   |  31  |  3.54<H>    Ca    9.3      05 Sep 2021 10:06  Phos  3.6     09-05  Mg     2.3     09-05    TPro  6.8  /  Alb  2.6<L>  /  TBili  0.3  /  DBili  x   /  AST  8<L>  /  ALT  10  /  AlkPhos  72  09-05        Magnesium, Serum: 2.3 mg/dL (09-05 @ 10:06)  Phosphorus Level, Serum: 3.6 mg/dL (09-05 @ 10:06)    Urine studies    PTH and Vit D:

## 2021-09-05 NOTE — CHART NOTE - NSCHARTNOTEFT_GEN_A_CORE
-called brother, Proxy Germán Rhoades to update regarding pt. unable to reach over phone. message left to call back

## 2021-09-06 LAB
ALBUMIN SERPL ELPH-MCNC: 2.3 G/DL — LOW (ref 3.5–5)
ALP SERPL-CCNC: 76 U/L — SIGNIFICANT CHANGE UP (ref 40–120)
ALT FLD-CCNC: 10 U/L DA — SIGNIFICANT CHANGE UP (ref 10–60)
ANION GAP SERPL CALC-SCNC: 4 MMOL/L — LOW (ref 5–17)
AST SERPL-CCNC: 7 U/L — LOW (ref 10–40)
BILIRUB SERPL-MCNC: 0.3 MG/DL — SIGNIFICANT CHANGE UP (ref 0.2–1.2)
BUN SERPL-MCNC: 69 MG/DL — HIGH (ref 7–18)
CALCIUM SERPL-MCNC: 9.6 MG/DL — SIGNIFICANT CHANGE UP (ref 8.4–10.5)
CHLORIDE SERPL-SCNC: 108 MMOL/L — SIGNIFICANT CHANGE UP (ref 96–108)
CO2 SERPL-SCNC: 31 MMOL/L — SIGNIFICANT CHANGE UP (ref 22–31)
CREAT SERPL-MCNC: 3.66 MG/DL — HIGH (ref 0.5–1.3)
GLUCOSE SERPL-MCNC: 97 MG/DL — SIGNIFICANT CHANGE UP (ref 70–99)
HCT VFR BLD CALC: 33.6 % — LOW (ref 39–50)
HGB BLD-MCNC: 10.5 G/DL — LOW (ref 13–17)
MAGNESIUM SERPL-MCNC: 2.5 MG/DL — SIGNIFICANT CHANGE UP (ref 1.6–2.6)
MCHC RBC-ENTMCNC: 31.3 GM/DL — LOW (ref 32–36)
MCHC RBC-ENTMCNC: 31.7 PG — SIGNIFICANT CHANGE UP (ref 27–34)
MCV RBC AUTO: 101.5 FL — HIGH (ref 80–100)
NRBC # BLD: 0 /100 WBCS — SIGNIFICANT CHANGE UP (ref 0–0)
PHOSPHATE SERPL-MCNC: 5.2 MG/DL — HIGH (ref 2.5–4.5)
PLATELET # BLD AUTO: 191 K/UL — SIGNIFICANT CHANGE UP (ref 150–400)
POTASSIUM SERPL-MCNC: 4.6 MMOL/L — SIGNIFICANT CHANGE UP (ref 3.5–5.3)
POTASSIUM SERPL-SCNC: 4.6 MMOL/L — SIGNIFICANT CHANGE UP (ref 3.5–5.3)
PROT SERPL-MCNC: 6.7 G/DL — SIGNIFICANT CHANGE UP (ref 6–8.3)
RBC # BLD: 3.31 M/UL — LOW (ref 4.2–5.8)
RBC # FLD: 13.4 % — SIGNIFICANT CHANGE UP (ref 10.3–14.5)
SARS-COV-2 RNA SPEC QL NAA+PROBE: SIGNIFICANT CHANGE UP
SODIUM SERPL-SCNC: 143 MMOL/L — SIGNIFICANT CHANGE UP (ref 135–145)
WBC # BLD: 7.66 K/UL — SIGNIFICANT CHANGE UP (ref 3.8–10.5)
WBC # FLD AUTO: 7.66 K/UL — SIGNIFICANT CHANGE UP (ref 3.8–10.5)

## 2021-09-06 RX ORDER — FUROSEMIDE 40 MG
40 TABLET ORAL DAILY
Refills: 0 | Status: DISCONTINUED | OUTPATIENT
Start: 2021-09-06 | End: 2021-09-08

## 2021-09-06 RX ADMIN — HEPARIN SODIUM 5000 UNIT(S): 5000 INJECTION INTRAVENOUS; SUBCUTANEOUS at 13:47

## 2021-09-06 RX ADMIN — CALCITRIOL 0.25 MICROGRAM(S): 0.5 CAPSULE ORAL at 11:50

## 2021-09-06 RX ADMIN — Medication 1 PATCH: at 11:52

## 2021-09-06 RX ADMIN — LIDOCAINE 1 PATCH: 4 CREAM TOPICAL at 11:50

## 2021-09-06 RX ADMIN — Medication 81 MILLIGRAM(S): at 11:48

## 2021-09-06 RX ADMIN — TAMSULOSIN HYDROCHLORIDE 0.8 MILLIGRAM(S): 0.4 CAPSULE ORAL at 21:11

## 2021-09-06 RX ADMIN — Medication 125 MICROGRAM(S): at 05:53

## 2021-09-06 RX ADMIN — SEVELAMER CARBONATE 800 MILLIGRAM(S): 2400 POWDER, FOR SUSPENSION ORAL at 08:43

## 2021-09-06 RX ADMIN — Medication 1 PATCH: at 13:47

## 2021-09-06 RX ADMIN — PANTOPRAZOLE SODIUM 40 MILLIGRAM(S): 20 TABLET, DELAYED RELEASE ORAL at 05:56

## 2021-09-06 RX ADMIN — BUDESONIDE AND FORMOTEROL FUMARATE DIHYDRATE 2 PUFF(S): 160; 4.5 AEROSOL RESPIRATORY (INHALATION) at 21:09

## 2021-09-06 RX ADMIN — POLYETHYLENE GLYCOL 3350 17 GRAM(S): 17 POWDER, FOR SOLUTION ORAL at 11:53

## 2021-09-06 RX ADMIN — ALBUTEROL 2 PUFF(S): 90 AEROSOL, METERED ORAL at 16:28

## 2021-09-06 RX ADMIN — ALBUTEROL 2 PUFF(S): 90 AEROSOL, METERED ORAL at 08:43

## 2021-09-06 RX ADMIN — DIVALPROEX SODIUM 500 MILLIGRAM(S): 500 TABLET, DELAYED RELEASE ORAL at 08:43

## 2021-09-06 RX ADMIN — SIMVASTATIN 20 MILLIGRAM(S): 20 TABLET, FILM COATED ORAL at 21:08

## 2021-09-06 RX ADMIN — SEVELAMER CARBONATE 800 MILLIGRAM(S): 2400 POWDER, FOR SUSPENSION ORAL at 11:49

## 2021-09-06 RX ADMIN — HEPARIN SODIUM 5000 UNIT(S): 5000 INJECTION INTRAVENOUS; SUBCUTANEOUS at 21:09

## 2021-09-06 RX ADMIN — Medication 1 PATCH: at 05:56

## 2021-09-06 RX ADMIN — ALBUTEROL 2 PUFF(S): 90 AEROSOL, METERED ORAL at 11:48

## 2021-09-06 RX ADMIN — Medication 10 MILLIGRAM(S): at 18:05

## 2021-09-06 RX ADMIN — BUPROPION HYDROCHLORIDE 150 MILLIGRAM(S): 150 TABLET, EXTENDED RELEASE ORAL at 11:48

## 2021-09-06 RX ADMIN — MUPIROCIN 1 APPLICATION(S): 20 OINTMENT TOPICAL at 05:54

## 2021-09-06 RX ADMIN — Medication 10 MILLIGRAM(S): at 05:54

## 2021-09-06 RX ADMIN — MUPIROCIN 1 APPLICATION(S): 20 OINTMENT TOPICAL at 18:06

## 2021-09-06 RX ADMIN — Medication 1 MILLIGRAM(S): at 11:49

## 2021-09-06 RX ADMIN — HEPARIN SODIUM 5000 UNIT(S): 5000 INJECTION INTRAVENOUS; SUBCUTANEOUS at 05:53

## 2021-09-06 RX ADMIN — Medication 1000 UNIT(S): at 11:48

## 2021-09-06 RX ADMIN — LIDOCAINE 1 PATCH: 4 CREAM TOPICAL at 18:39

## 2021-09-06 RX ADMIN — SEVELAMER CARBONATE 800 MILLIGRAM(S): 2400 POWDER, FOR SUSPENSION ORAL at 18:06

## 2021-09-06 RX ADMIN — FINASTERIDE 5 MILLIGRAM(S): 5 TABLET, FILM COATED ORAL at 11:48

## 2021-09-06 RX ADMIN — Medication 40 MILLIGRAM(S): at 05:54

## 2021-09-06 RX ADMIN — Medication 1 PATCH: at 18:39

## 2021-09-06 RX ADMIN — RISPERIDONE 2 MILLIGRAM(S): 4 TABLET ORAL at 21:09

## 2021-09-06 RX ADMIN — BUDESONIDE AND FORMOTEROL FUMARATE DIHYDRATE 2 PUFF(S): 160; 4.5 AEROSOL RESPIRATORY (INHALATION) at 10:43

## 2021-09-06 RX ADMIN — Medication 325 MILLIGRAM(S): at 11:49

## 2021-09-06 NOTE — PROGRESS NOTE ADULT - ASSESSMENT
61 year old man with morbid obesity from [facility] [place] ambulates w/ walker has past medical hx of COPD not on O2 at home, hypothyroidism, anemia, left DVT, CKD, BPH, HLD, bipolar disorder, initially admitted for increased lethargy is admitted for hypercapnic respiratroy failure requiring BIPAP.  On 8/31, pt was noted to be increasingly lethargic and drowsy and was hypotensive as well. Rapid response team was called. Patient was only opening eyes to painful stimuli. Pinpoint pupils were noted. Chart review showed he received Dilaudid 4mg PO in the afternoon, Narcan 0.4mg was given and pt opened eyes.   ABG shows PCO2 of 100 for which pt was placed on BiPAP in ICU and improved. IV Lasix 40 mg added for euvolemic status. Pt was able to drink water and take pills 9/1, hence diet advanced to soft. Latest ABG 7.22/62/83/25 on 4 liters NC, hence patient is being downgraded to AI service.     9/2 - Failed TOV, daley reinserted  9/4 - PT rec TG, pending choices, will likely go to [facility] per pt

## 2021-09-06 NOTE — PROGRESS NOTE ADULT - ASSESSMENT
1. SHEYLA due to r/o ATN due to possible CRS  -scr slight increase but stable electrolytes and close to baseline. change to  lasix 40mg oral daily.   -UA shows some proteinuria. -resend urine lytes (uosm, urine sodium, urine creatinine, chloride, potassium), spot protein to creatinine ratio  - renal sonogram shows no hydronephrosis with Right kidney 11.8cm and Left kidney 12.0cm with increase echogenicity   -Adjust meds to eGFR and avoid IV Gadolinium contrast,NSAIDs, and phosphate enema.  -Monitor I/O's daily.   -Monitor SMA daily.  2. CKD stage 4 most likely due to ischemic nephropathy  -baseline scr around 3.1mg/dL  -Keep patient euvolemic and renal diet  -Avoid Nephrotoxic Meds/ Agents such as (NSAIDs, IV contrast, Aminoglycosides such as gentamicin, -Gadolinium contrast, Phosphate containing enemas, etc..)  -Adjust Medications according to eGFR  3. Anemia:  -on iron tabs.   -F/u CBC daily  -transfuse if HB < 7.0.  3. Mineral Bone Disease:  -phos is elevated and on renvela 1 tab tid and Vitamin 25 OH is low and PTH intact is 330, on  calcitriol 0.25mcg daily.   4. Respiratory Acidosis: on bipap prn and echo noted. on lasix. cardiology consulted.     Discussed with patient in detail regarding the renal plan and care  Discussed the assessment and plan with Primary Team/Nurse

## 2021-09-06 NOTE — PROGRESS NOTE ADULT - ATTENDING COMMENTS
Problem/Plan - 1:  ·  Problem: Acute respiratory failure with hypercapnia.   ·  Plan: CXR noted above  SPO2 93% on 2L via n/c, saturaing well with NC  Pt takes Albuterol and Symbicort at home  Continue home regimen   Monitor oxygen saturations  using few hrs of bipap at bight, not requiring through out night.     Problem/Plan - 2:  ·  Problem: Weakness.   ·  Plan: Likely secondary to hypoglycemia  C/w FS Q6hr  PT recommends TG.  continue BiPaP at night

## 2021-09-06 NOTE — PROGRESS NOTE ADULT - SUBJECTIVE AND OBJECTIVE BOX
C A R D I O L O G Y  *********************    DATE OF SERVICE: 9-6-21    HISTORY OF PRESENT ILLNESS: HPI:  61 year old man with morbid obesity from [facility] [place] ambulates w/ walker has past medical hx of COPD not on O2 at home, hypothyroidism, anemia, left DVT, CKD, BPH, HLD, bipolar disorder, ambulatory dysfunction, lymphedema was BIBEMS as patient was complaining of worsening lower extremity weakness, lightheaded and overall not feeling well for one day. Of note, patient with slurred speech at baseline since childhood and known ambulatory dysfunction reason why he is on IRA. Patient denies chest pain, palpitations, sob or other symptoms.  No CP, No LOC    Transferred out of ICU to floor.  Hypercarbic respiratory failure due to opiate overuse has resolved.    Patient states no complaints, feels well, awaiting rehab then back to assisted living.    acetaminophen   Tablet .. 1000 milliGRAM(s) Oral every 8 hours PRN  ALBUTerol    90 MICROgram(s) HFA Inhaler 2 Puff(s) Inhalation <User Schedule>  artificial  tears Solution 1 Drop(s) Right EYE every 6 hours PRN  aspirin enteric coated 81 milliGRAM(s) Oral daily  budesonide 160 MICROgram(s)/formoterol 4.5 MICROgram(s) Inhaler 2 Puff(s) Inhalation two times a day  buPROPion XL (24-Hour) . 150 milliGRAM(s) Oral daily  busPIRone 10 milliGRAM(s) Oral two times a day  calcitriol   Capsule 0.25 MICROGram(s) Oral daily  cholecalciferol 1000 Unit(s) Oral daily  diVALproex  milliGRAM(s) Oral <User Schedule>  ferrous    sulfate 325 milliGRAM(s) Oral daily  finasteride 5 milliGRAM(s) Oral daily  folic acid 1 milliGRAM(s) Oral daily  furosemide    Tablet 40 milliGRAM(s) Oral daily  heparin   Injectable 5000 Unit(s) SubCutaneous every 8 hours  levothyroxine 125 MICROGram(s) Oral daily  lidocaine   4% Patch 1 Patch Transdermal daily  mupirocin 2% Ointment 1 Application(s) Both Nostrils every 12 hours  nicotine -  14 mG/24Hr(s) Patch 1 patch Transdermal daily  pantoprazole    Tablet 40 milliGRAM(s) Oral before breakfast  polyethylene glycol 3350 17 Gram(s) Oral daily  risperiDONE   Tablet 2 milliGRAM(s) Oral <User Schedule>  senna 2 Tablet(s) Oral at bedtime  sevelamer carbonate 800 milliGRAM(s) Oral three times a day with meals  simvastatin 20 milliGRAM(s) Oral at bedtime  tamsulosin 0.8 milliGRAM(s) Oral at bedtime                            10.5   7.66  )-----------( 191      ( 06 Sep 2021 03:51 )             33.6       09-06    143  |  108  |  69<H>  ----------------------------<  97  4.6   |  31  |  3.66<H>    Ca    9.6      06 Sep 2021 03:51  Phos  5.2     09-06  Mg     2.5     09-06    TPro  6.7  /  Alb  2.3<L>  /  TBili  0.3  /  DBili  x   /  AST  7<L>  /  ALT  10  /  AlkPhos  76  09-06      T(C): 37.3 (09-05-21 @ 20:51), Max: 37.3 (09-05-21 @ 14:32)  HR: 79 (09-06-21 @ 08:35) (79 - 86)  BP: 108/48 (09-05-21 @ 20:51) (108/48 - 110/48)  RR: 20 (09-05-21 @ 20:51) (18 - 20)  SpO2: 94% (09-06-21 @ 08:35) (93% - 98%)  Wt(kg): --    I&O's Summary    05 Sep 2021 07:01  -  06 Sep 2021 07:00  --------------------------------------------------------  IN: 0 mL / OUT: 4400 mL / NET: -4400 mL    06 Sep 2021 07:01  -  06 Sep 2021 12:14  --------------------------------------------------------  IN: 0 mL / OUT: 1200 mL / NET: -1200 mL        Gen: pleasant overweight white male in no acute distress, cooperative.  HEENT:  (-)icterus (-)pallor  CV: N S1 S2 1/6 ABIGAIL (+)2 Pulses B/l  Resp:  Clear to ausculatation B/L, normal effort  GI: (+) BS Soft, NT, ND  Lymph:  severe  edema, (-)obvious lymphadenopathy  Skin: Warm to touch, Normal turgor  Psych: Appropriate mood and affect      ASSESSMENT/PLAN: 	61y Male morbid obesity from [facility] [place] ambulates w/ walker has past medical hx of COPD not on O2 at home, hypothyroidism, anemia, left DVT, CKD, BPH, HLD, bipolar disorder, ambulatory dysfunction, lymphedema was BIBEMS as patient was complaining of worsening lower extremity weakness and  lightheadedness    - Recovering well after Narcan for opiate toxicity.  Cont tele  - Echo is overall reassuring.  - Work toward discharge back to assisted living.  No opposition to discharge/xfer when available.  - No angina or CHF  - Clinical presentation is sequale of a primary pulmonary process    Munir Bass M.D.  Cardiac Electrophysiology  [phone]

## 2021-09-06 NOTE — PROGRESS NOTE ADULT - PROBLEM SELECTOR PLAN 1
CXR noted above  SPO2 93% on 2L via n/c, saturaing well with NC  Pt takes Albuterol and Symbicort at home  Continue home regimen   Monitor oxygen saturations  using few hrs of bipap at [facility], not requiring through out night

## 2021-09-06 NOTE — PROGRESS NOTE ADULT - SUBJECTIVE AND OBJECTIVE BOX
Patient is a 61y old  Male who presents with a chief complaint of SHEYLA ON CKD, DEMAND ISCHEMIA, AMBULATORY DYSFUNCTION (06 Sep 2021 10:53)    PATIENT IS SEEN AND EXAMINED IN MEDICAL FLOOR.  DORIST [    ]    ALYSIA [   ]      GT [   ]    ALLERGIES:  No Known Allergies      Daily     Daily Weight in k.2 (06 Sep 2021 08:44)    VITALS:    Vital Signs Last 24 Hrs  T(C): 37.3 (05 Sep 2021 20:51), Max: 37.3 (05 Sep 2021 14:32)  T(F): 99.2 (05 Sep 2021 20:51), Max: 99.2 (05 Sep 2021 14:32)  HR: 79 (06 Sep 2021 08:35) (79 - 86)  BP: 108/48 (05 Sep 2021 20:51) (108/48 - 110/48)  BP(mean): --  RR: 20 (05 Sep 2021 20:51) (18 - 20)  SpO2: 94% (06 Sep 2021 08:35) (93% - 98%)    LABS:    CBC Full  -  ( 06 Sep 2021 03:51 )  WBC Count : 7.66 K/uL  RBC Count : 3.31 M/uL  Hemoglobin : 10.5 g/dL  Hematocrit : 33.6 %  Platelet Count - Automated : 191 K/uL  Mean Cell Volume : 101.5 fl  Mean Cell Hemoglobin : 31.7 pg  Mean Cell Hemoglobin Concentration : 31.3 gm/dL  Auto Neutrophil # : x  Auto Lymphocyte # : x  Auto Monocyte # : x  Auto Eosinophil # : x  Auto Basophil # : x  Auto Neutrophil % : x  Auto Lymphocyte % : x  Auto Monocyte % : x  Auto Eosinophil % : x  Auto Basophil % : x          143  |  108  |  69<H>  ----------------------------<  97  4.6   |  31  |  3.66<H>    Ca    9.6      06 Sep 2021 03:51  Phos  5.2     -  Mg     2.5     -    TPro  6.7  /  Alb  2.3<L>  /  TBili  0.3  /  DBili  x   /  AST  7<L>  /  ALT  10  /  AlkPhos  76  -06    CAPILLARY BLOOD GLUCOSE      POCT Blood Glucose.: 104 mg/dL (06 Sep 2021 07:36)  POCT Blood Glucose.: 118 mg/dL (05 Sep 2021 20:59)  POCT Blood Glucose.: 117 mg/dL (05 Sep 2021 16:52)  POCT Blood Glucose.: 152 mg/dL (05 Sep 2021 11:34)        LIVER FUNCTIONS - ( 06 Sep 2021 03:51 )  Alb: 2.3 g/dL / Pro: 6.7 g/dL / ALK PHOS: 76 U/L / ALT: 10 U/L DA / AST: 7 U/L / GGT: x           Creatinine Trend: 3.66<--, 3.54<--, 3.38<--, 3.65<--, 3.62<--, 4.31<--  I&O's Summary    05 Sep 2021 07:01  -  06 Sep 2021 07:00  --------------------------------------------------------  IN: 0 mL / OUT: 4400 mL / NET: -4400 mL    06 Sep 2021 07:01  -  06 Sep 2021 11:26  --------------------------------------------------------  IN: 0 mL / OUT: 1200 mL / NET: -1200 mL            Clean Catch Clean Catch (Midstream)  08- @ 21:13   <10,000 CFU/mL Normal Urogenital Ivelisse  --  --          MEDICATIONS:    MEDICATIONS  (STANDING):  ALBUTerol    90 MICROgram(s) HFA Inhaler 2 Puff(s) Inhalation <User Schedule>  aspirin enteric coated 81 milliGRAM(s) Oral daily  budesonide 160 MICROgram(s)/formoterol 4.5 MICROgram(s) Inhaler 2 Puff(s) Inhalation two times a day  buPROPion XL (24-Hour) . 150 milliGRAM(s) Oral daily  busPIRone 10 milliGRAM(s) Oral two times a day  calcitriol   Capsule 0.25 MICROGram(s) Oral daily  cholecalciferol 1000 Unit(s) Oral daily  diVALproex  milliGRAM(s) Oral <User Schedule>  ferrous    sulfate 325 milliGRAM(s) Oral daily  finasteride 5 milliGRAM(s) Oral daily  folic acid 1 milliGRAM(s) Oral daily  furosemide    Tablet 40 milliGRAM(s) Oral daily  heparin   Injectable 5000 Unit(s) SubCutaneous every 8 hours  levothyroxine 125 MICROGram(s) Oral daily  lidocaine   4% Patch 1 Patch Transdermal daily  mupirocin 2% Ointment 1 Application(s) Both Nostrils every 12 hours  nicotine -  14 mG/24Hr(s) Patch 1 patch Transdermal daily  pantoprazole    Tablet 40 milliGRAM(s) Oral before breakfast  polyethylene glycol 3350 17 Gram(s) Oral daily  risperiDONE   Tablet 2 milliGRAM(s) Oral <User Schedule>  senna 2 Tablet(s) Oral at bedtime  sevelamer carbonate 800 milliGRAM(s) Oral three times a day with meals  simvastatin 20 milliGRAM(s) Oral at bedtime  tamsulosin 0.8 milliGRAM(s) Oral at bedtime      MEDICATIONS  (PRN):  acetaminophen   Tablet .. 1000 milliGRAM(s) Oral every 8 hours PRN Moderate Pain (4 - 6)  artificial  tears Solution 1 Drop(s) Right EYE every 6 hours PRN Dry Eyes      REVIEW OF SYSTEMS:                           ALL ROS DONE [ X   ]    CONSTITUTIONAL:  LETHARGIC [   ], FEVER [   ], UNRESPONSIVE [   ]  CVS:  CP  [   ], SOB, [   ], PALPITATIONS [   ], DIZZYNESS [   ]  RS: COUGH [   ], SPUTUM [   ]  GI: ABDOMINAL PAIN [   ], NAUSEA [   ], VOMITINGS [   ], DIARRHEA [   ], CONSTIPATION [   ]  :  DYSURIA [   ], NOCTURIA [   ], INCREASED FREQUENCY [   ], DRIBLING [   ],  SKELETAL: PAINFUL JOINTS [   ], SWOLLEN JOINTS [   ], NECK ACHE [   ], LOW BACK ACHE [   ],  SKIN : ULCERS [   ], RASH [   ], ITCHING [   ]  CNS: HEAD ACHE [   ], DOUBLE VISION [   ], BLURRED VISION [   ], AMS / CONFUSION [   ], SEIZURES [   ], WEAKNESS [   ],TINGLING / NUMBNESS [   ]    PHYSICAL EXAMINATION:  GENERAL APPEARANCE: NO DISTRESS  HEENT:  NO PALLOR, NO  JVD,  NO   NODES, NECK SUPPLE  CVS: S1 +, S2 +,   RS: AEEB,  OCCASIONAL  RALES +,   NO RONCHI  ABD: SOFT, NT, NO, BS +  EXT: PE ++  SKIN: WARM,   SKELETAL:  ROM ACCEPTABLE  CNS:  AAO X 2     RADIOLOGY :    RADIOLOGY REVIEWED    ASSESSMENT :     Weakness    No pertinent past medical history    Hypothyroid    Hyperlipidemia    Schizophrenia    Schizoaffective disorder    Ambulatory dysfunction    Anemia    History of BPH    CKD (chronic kidney disease)    Chronic obstructive pulmonary disease (COPD)    Bipolar disorder    Bipolar disorder    No significant past surgical history        PLAN:  HPI:  61 year old man with morbid obesity from [facility] [place] ambulates w/ walker has past medical hx of COPD not on O2 at home, hypothyroidism, anemia, left DVT, CKD, BPH, HLD, bipolar disorder, ambulatory dysfunction, lymphedema was BIBEMS as patient was complaining of worsening lower extremity weakness, lightheaded and overall not feeling well for one day. Of note, patient with slurred speech at baseline since childhood and known ambulatory dysfunction reason why he is on prison. Patient denies chest pain, palpitations, sob or other symptoms.      [facility] full code    (30 Aug 2021 17:02)    # PT EVAL RECOMMENDED TG - D/C PLAN TO [facility]    # RRT FOR AMS [] FOUND TO HAVE ACUTE HYPERCAPNIA - GIVEN NARCAN [PATIENT'S RESPONSIVENESS IMPROVED], NARCOTICS DISCONTINUED, TRANSFERRED TO ICU ON BIPAP  - PAIN MGMT CONSULT  # HYPOGLYCEMIA S/T POOR PO INTAKE - RESOLVED  # ELEVATED TROPONIN - TREND, ECHOCARDIOGRAM - W/ MILD PULM HTN, CARDIOLOGY CONSULT  # SHEYLA ON CKD - IMPROVING - FAILED TOV W/ HATFIELD - MONITOR CR, AVOID NEPHROTOXIC AGENTS - ? ATN  - MONITOR IS AND OS  - INCREASED FLOMAX, CONTINUE FINASTERIDE  - HOLD DIURETICS  - NEPHROLOGY CONSULT  # COPD  # HYPOTHYROIDISM  # MORBID OBESITY  # CHRONIC VENOUS INSUFFICIENCY, HX OF LEFT DVT  # F/U PT EVAL  # GI AND DVT PPX

## 2021-09-06 NOTE — CHART NOTE - NSCHARTNOTEFT_GEN_A_CORE
- called proxy Germán for update regarding pt's conditions. left message for brother to call back if there is any questions regarding pt.

## 2021-09-06 NOTE — PROGRESS NOTE ADULT - SUBJECTIVE AND OBJECTIVE BOX
NEPHROLOGY MEDICAL CARE, [facility] - Dr. Taj Valenzuela/ Dr. Hazel Solis/ Dr. Cosmo Root/ Dr. Fang Gifford    Patient was seen and examined at bedside.    CC: patient is okay and no sob.    Vital Signs Last 24 Hrs  T(C): 37.3 (05 Sep 2021 20:51), Max: 37.3 (05 Sep 2021 14:32)  T(F): 99.2 (05 Sep 2021 20:51), Max: 99.2 (05 Sep 2021 14:32)  HR: 79 (06 Sep 2021 08:35) (79 - 86)  BP: 108/48 (05 Sep 2021 20:51) (108/48 - 110/48)  BP(mean): --  RR: 20 (05 Sep 2021 20:51) (18 - 20)  SpO2: 94% (06 Sep 2021 08:35) (93% - 98%)    09-05 @ 07:01 - 09-06 @ 07:00  --------------------------------------------------------  IN: 0 mL / OUT: 4400 mL / NET: -4400 mL    09-06 @ 07:01  -  09-06 @ 10:53  --------------------------------------------------------  IN: 0 mL / OUT: 1200 mL / NET: -1200 mL        PHYSICAL EXAM:  General: No acute respiratory distress; obese  Eyes: conjunctiva and sclera clear  ENMT: Atraumatic, Normocephalic, supple, No JVD present. Moist mucous membranes  Respiratory: b/l poor air entry; No rales, rhonchi, wheezing  Cardiovasular: S1S2+; no m/r/g  Gastrointestinal: Soft, Non-tender, Nondistended; Bowel sounds present  Neuro:  Awake, Alert & Oriented X3  Ext:  2+ pedal edema with chronic venous stasis; No Cyanosis  Skin: chronic vascular changes of the b/l legs.    MEDICATIONS:  MEDICATIONS  (STANDING):  ALBUTerol    90 MICROgram(s) HFA Inhaler 2 Puff(s) Inhalation <User Schedule>  aspirin enteric coated 81 milliGRAM(s) Oral daily  budesonide 160 MICROgram(s)/formoterol 4.5 MICROgram(s) Inhaler 2 Puff(s) Inhalation two times a day  buPROPion XL (24-Hour) . 150 milliGRAM(s) Oral daily  busPIRone 10 milliGRAM(s) Oral two times a day  calcitriol   Capsule 0.25 MICROGram(s) Oral daily  cholecalciferol 1000 Unit(s) Oral daily  diVALproex  milliGRAM(s) Oral <User Schedule>  ferrous    sulfate 325 milliGRAM(s) Oral daily  finasteride 5 milliGRAM(s) Oral daily  folic acid 1 milliGRAM(s) Oral daily  furosemide    Tablet 40 milliGRAM(s) Oral daily  heparin   Injectable 5000 Unit(s) SubCutaneous every 8 hours  levothyroxine 125 MICROGram(s) Oral daily  lidocaine   4% Patch 1 Patch Transdermal daily  mupirocin 2% Ointment 1 Application(s) Both Nostrils every 12 hours  nicotine -  14 mG/24Hr(s) Patch 1 patch Transdermal daily  pantoprazole    Tablet 40 milliGRAM(s) Oral before breakfast  polyethylene glycol 3350 17 Gram(s) Oral daily  risperiDONE   Tablet 2 milliGRAM(s) Oral <User Schedule>  senna 2 Tablet(s) Oral at bedtime  sevelamer carbonate 800 milliGRAM(s) Oral three times a day with meals  simvastatin 20 milliGRAM(s) Oral at bedtime  tamsulosin 0.8 milliGRAM(s) Oral at bedtime    MEDICATIONS  (PRN):  acetaminophen   Tablet .. 1000 milliGRAM(s) Oral every 8 hours PRN Moderate Pain (4 - 6)  artificial  tears Solution 1 Drop(s) Right EYE every 6 hours PRN Dry Eyes          LABS:                        10.5   7.66  )-----------( 191      ( 06 Sep 2021 03:51 )             33.6     09-06    143  |  108  |  69<H>  ----------------------------<  97  4.6   |  31  |  3.66<H>    Ca    9.6      06 Sep 2021 03:51  Phos  5.2     09-06  Mg     2.5     09-06    TPro  6.7  /  Alb  2.3<L>  /  TBili  0.3  /  DBili  x   /  AST  7<L>  /  ALT  10  /  AlkPhos  76  09-06        Magnesium, Serum: 2.5 mg/dL (09-06 @ 03:51)  Phosphorus Level, Serum: 5.2 mg/dL (09-06 @ 03:51)    Urine studies    PTH and Vit D:

## 2021-09-06 NOTE — PROGRESS NOTE ADULT - SUBJECTIVE AND OBJECTIVE BOX
GAYLA PEARCE    SCU progress note    INTERVAL HPI/OVERNIGHT EVENTS: no new complaints    DNR [ ]   DNI  [  ] FULL CODE    Covid - 19 PCR: negative since 9/6/21    The 4Ms    What Matters Most: see GOC  Age appropriate Medications/Screen for High Risk Medication: Yes  Mentation: see CAM below  Mobility: defer to physical exam    The Confusion Assessment Method (CAM) Diagnostic Algorithm     1: Acute Onset or Fluctuating Course  - Is there evidence of an acute change in mental status from the patient’s baseline? Did the (abnormal) behavior  fluctuate during the day, that is, tend to come and go, or increase and decrease in severity?  [ ] YES [ x] NO     2: Inattention  - Did the patient have difficulty focusing attention, being easily distractible, or having difficulty keeping track of what was being said?  [ ] YES [x ] NO     3: Disorganized thinking  -Was the patient’s thinking disorganized or incoherent, such as rambling or irrelevant conversation, unclear or illogical flow of ideas, or unpredictable switching from subject to subject?  [ ] YES [x ] NO    4: Altered Level of consciousness?  [ ] YES [x ] NO    The diagnosis of delirium by CAM requires the presence of features 1 and 2 and either 3 or 4.    PRESSORS: [ ] YES [x ] NO    Cardiovascular:  Heart Failure  Acute   Acute on Chronic  Chronic       furosemide    Tablet 40 milliGRAM(s) Oral daily  tamsulosin 0.8 milliGRAM(s) Oral at bedtime    Pulmonary:  ALBUTerol    90 MICROgram(s) HFA Inhaler 2 Puff(s) Inhalation <User Schedule>  budesonide 160 MICROgram(s)/formoterol 4.5 MICROgram(s) Inhaler 2 Puff(s) Inhalation two times a day    Hematalogic:  aspirin enteric coated 81 milliGRAM(s) Oral daily  heparin   Injectable 5000 Unit(s) SubCutaneous every 8 hours    Other:  acetaminophen   Tablet .. 1000 milliGRAM(s) Oral every 8 hours PRN  artificial  tears Solution 1 Drop(s) Right EYE every 6 hours PRN  buPROPion XL (24-Hour) . 150 milliGRAM(s) Oral daily  busPIRone 10 milliGRAM(s) Oral two times a day  calcitriol   Capsule 0.25 MICROGram(s) Oral daily  cholecalciferol 1000 Unit(s) Oral daily  diVALproex  milliGRAM(s) Oral <User Schedule>  ferrous    sulfate 325 milliGRAM(s) Oral daily  finasteride 5 milliGRAM(s) Oral daily  folic acid 1 milliGRAM(s) Oral daily  levothyroxine 125 MICROGram(s) Oral daily  lidocaine   4% Patch 1 Patch Transdermal daily  mupirocin 2% Ointment 1 Application(s) Both Nostrils every 12 hours  nicotine -  14 mG/24Hr(s) Patch 1 patch Transdermal daily  pantoprazole    Tablet 40 milliGRAM(s) Oral before breakfast  polyethylene glycol 3350 17 Gram(s) Oral daily  risperiDONE   Tablet 2 milliGRAM(s) Oral <User Schedule>  senna 2 Tablet(s) Oral at bedtime  sevelamer carbonate 800 milliGRAM(s) Oral three times a day with meals  simvastatin 20 milliGRAM(s) Oral at bedtime    acetaminophen   Tablet .. 1000 milliGRAM(s) Oral every 8 hours PRN  ALBUTerol    90 MICROgram(s) HFA Inhaler 2 Puff(s) Inhalation <User Schedule>  artificial  tears Solution 1 Drop(s) Right EYE every 6 hours PRN  aspirin enteric coated 81 milliGRAM(s) Oral daily  budesonide 160 MICROgram(s)/formoterol 4.5 MICROgram(s) Inhaler 2 Puff(s) Inhalation two times a day  buPROPion XL (24-Hour) . 150 milliGRAM(s) Oral daily  busPIRone 10 milliGRAM(s) Oral two times a day  calcitriol   Capsule 0.25 MICROGram(s) Oral daily  cholecalciferol 1000 Unit(s) Oral daily  diVALproex  milliGRAM(s) Oral <User Schedule>  ferrous    sulfate 325 milliGRAM(s) Oral daily  finasteride 5 milliGRAM(s) Oral daily  folic acid 1 milliGRAM(s) Oral daily  furosemide    Tablet 40 milliGRAM(s) Oral daily  heparin   Injectable 5000 Unit(s) SubCutaneous every 8 hours  levothyroxine 125 MICROGram(s) Oral daily  lidocaine   4% Patch 1 Patch Transdermal daily  mupirocin 2% Ointment 1 Application(s) Both Nostrils every 12 hours  nicotine -  14 mG/24Hr(s) Patch 1 patch Transdermal daily  pantoprazole    Tablet 40 milliGRAM(s) Oral before breakfast  polyethylene glycol 3350 17 Gram(s) Oral daily  risperiDONE   Tablet 2 milliGRAM(s) Oral <User Schedule>  senna 2 Tablet(s) Oral at bedtime  sevelamer carbonate 800 milliGRAM(s) Oral three times a day with meals  simvastatin 20 milliGRAM(s) Oral at bedtime  tamsulosin 0.8 milliGRAM(s) Oral at bedtime    Drug Dosing Weight  Height (cm): 172.7 (30 Aug 2021 08:52)  Weight (kg): 135.1 (31 Aug 2021 22:45)  BMI (kg/m2): 45.3 (31 Aug 2021 22:45)  BSA (m2): 2.42 (31 Aug 2021 22:45)    CENTRAL LINE: [ ] YES [ ] NO  LOCATION:   DATE INSERTED:  REMOVE: [ ] YES [ ] NO  EXPLAIN:    HATFIELD: [ ] YES [ ] NO    DATE INSERTED:  REMOVE:  [ ] YES [ ] NO  EXPLAIN:    PAST MEDICAL & SURGICAL HISTORY:  Hypothyroid    Hyperlipidemia    Ambulatory dysfunction    Anemia    History of BPH    CKD (chronic kidney disease)    Chronic obstructive pulmonary disease (COPD)    Bipolar disorder      09-05 @ 07:01  -  09-06 @ 07:00  --------------------------------------------------------  IN: 0 mL / OUT: 4400 mL / NET: -4400 mL    PHYSICAL EXAM:    GENERAL: NAD, well-groomed, well-developed  HEAD:  Atraumatic, Normocephalic  EYES: EOMI, PERRLA, conjunctiva and sclera clear  ENMT: No tonsillar erythema, exudates, or enlargement; Moist mucous membranes, Good dentition, No lesions  NECK: Supple, No JVD, Normal thyroid  NERVOUS SYSTEM:  Alert & Oriented X3, Good concentration; Motor Strength 5/5 B/L upper and lower extremities; DTRs 2+ intact and symmetric  CHEST/LUNG: Clear to percussion bilaterally; No rales, rhonchi, wheezing, or rubs  HEART: Regular rate and rhythm; No murmurs, rubs, or gallops  ABDOMEN: Soft, Nontender, Nondistended; Bowel sounds present  EXTREMITIES:  Generalized edema BLE  LYMPH: lymphadenopathy noted  SKIN: No rashes or lesions      LABS:  CBC Full  -  ( 06 Sep 2021 03:51 )  WBC Count : 7.66 K/uL  RBC Count : 3.31 M/uL  Hemoglobin : 10.5 g/dL  Hematocrit : 33.6 %  Platelet Count - Automated : 191 K/uL  Mean Cell Volume : 101.5 fl  Mean Cell Hemoglobin : 31.7 pg  Mean Cell Hemoglobin Concentration : 31.3 gm/dL  Auto Neutrophil # : x  Auto Lymphocyte # : x  Auto Monocyte # : x  Auto Eosinophil # : x  Auto Basophil # : x  Auto Neutrophil % : x  Auto Lymphocyte % : x  Auto Monocyte % : x  Auto Eosinophil % : x  Auto Basophil % : x    09-06    143  |  108  |  69<H>  ----------------------------<  97  4.6   |  31  |  3.66<H>    Ca    9.6      06 Sep 2021 03:51  Phos  5.2     09-06  Mg     2.5     09-06    TPro  6.7  /  Alb  2.3<L>  /  TBili  0.3  /  DBili  x   /  AST  7<L>  /  ALT  10  /  AlkPhos  76  09-06    [ x ]  DVT Prophylaxis  [  ]  Nutrition, Brand, Taking PO         Goal Rate        Abnormal Nutritional Status -  Malnutrition   Cachexia      Morbid Obesity BMI >/=40    RADIOLOGY & ADDITIONAL STUDIES:  < from: Xray Chest 1 View- PORTABLE-Routine (Xray Chest 1 View- PORTABLE-Routine in AM.) (09.01.21 @ 07:00) >  EXAM:  XR CHEST PORTABLE ROUTINE 1V                            PROCEDURE DATE:  09/01/2021          INTERPRETATION:  Chest one view    HISTORY: Pulmonary edema    COMPARISON STUDY: 8/30/2021    Frontal expiratory view of the chest shows the heart to be similar in size. The lungs show mild bilateral atelectasis. Pulmonary vascularity is within normal limits and there is no evidence of pneumothorax nor pleural effusion. Elevated left hemidiaphragm is again noted.    IMPRESSION:  Atelectasis. No evidence of pulmonary edema.        Thank you for the courtesy of this referral.    --- End of Report ---      ROYCE PALACIOS MD; Attending Interventional Radiologist  This document has been electronically signed. Sep  1 2021  3:15PM    < end of copied text >      Goals of Care Discussion with Family/Proxy/Other   - see note from/family meeting set up for

## 2021-09-07 LAB
ALBUMIN SERPL ELPH-MCNC: 2.4 G/DL — LOW (ref 3.5–5)
ALP SERPL-CCNC: 78 U/L — SIGNIFICANT CHANGE UP (ref 40–120)
ALT FLD-CCNC: 10 U/L DA — SIGNIFICANT CHANGE UP (ref 10–60)
ANION GAP SERPL CALC-SCNC: 7 MMOL/L — SIGNIFICANT CHANGE UP (ref 5–17)
AST SERPL-CCNC: 6 U/L — LOW (ref 10–40)
BILIRUB SERPL-MCNC: 0.3 MG/DL — SIGNIFICANT CHANGE UP (ref 0.2–1.2)
BUN SERPL-MCNC: 72 MG/DL — HIGH (ref 7–18)
CALCIUM SERPL-MCNC: 9.5 MG/DL — SIGNIFICANT CHANGE UP (ref 8.4–10.5)
CHLORIDE SERPL-SCNC: 107 MMOL/L — SIGNIFICANT CHANGE UP (ref 96–108)
CO2 SERPL-SCNC: 29 MMOL/L — SIGNIFICANT CHANGE UP (ref 22–31)
CREAT SERPL-MCNC: 3.67 MG/DL — HIGH (ref 0.5–1.3)
GLUCOSE SERPL-MCNC: 93 MG/DL — SIGNIFICANT CHANGE UP (ref 70–99)
HCT VFR BLD CALC: 34.3 % — LOW (ref 39–50)
HGB BLD-MCNC: 10.9 G/DL — LOW (ref 13–17)
MAGNESIUM SERPL-MCNC: 2.5 MG/DL — SIGNIFICANT CHANGE UP (ref 1.6–2.6)
MCHC RBC-ENTMCNC: 31.8 GM/DL — LOW (ref 32–36)
MCHC RBC-ENTMCNC: 32.6 PG — SIGNIFICANT CHANGE UP (ref 27–34)
MCV RBC AUTO: 102.7 FL — HIGH (ref 80–100)
NRBC # BLD: 0 /100 WBCS — SIGNIFICANT CHANGE UP (ref 0–0)
PHOSPHATE SERPL-MCNC: 4.5 MG/DL — SIGNIFICANT CHANGE UP (ref 2.5–4.5)
PLATELET # BLD AUTO: 189 K/UL — SIGNIFICANT CHANGE UP (ref 150–400)
POTASSIUM SERPL-MCNC: 4.3 MMOL/L — SIGNIFICANT CHANGE UP (ref 3.5–5.3)
POTASSIUM SERPL-SCNC: 4.3 MMOL/L — SIGNIFICANT CHANGE UP (ref 3.5–5.3)
PROT SERPL-MCNC: 6.9 G/DL — SIGNIFICANT CHANGE UP (ref 6–8.3)
RBC # BLD: 3.34 M/UL — LOW (ref 4.2–5.8)
RBC # FLD: 13.5 % — SIGNIFICANT CHANGE UP (ref 10.3–14.5)
SODIUM SERPL-SCNC: 143 MMOL/L — SIGNIFICANT CHANGE UP (ref 135–145)
WBC # BLD: 8.12 K/UL — SIGNIFICANT CHANGE UP (ref 3.8–10.5)
WBC # FLD AUTO: 8.12 K/UL — SIGNIFICANT CHANGE UP (ref 3.8–10.5)

## 2021-09-07 RX ORDER — SIMETHICONE 80 MG/1
80 TABLET, CHEWABLE ORAL THREE TIMES A DAY
Refills: 0 | Status: DISCONTINUED | OUTPATIENT
Start: 2021-09-07 | End: 2021-09-08

## 2021-09-07 RX ORDER — CHLORHEXIDINE GLUCONATE 213 G/1000ML
1 SOLUTION TOPICAL
Refills: 0 | Status: DISCONTINUED | OUTPATIENT
Start: 2021-09-07 | End: 2021-09-08

## 2021-09-07 RX ADMIN — Medication 1 PATCH: at 12:01

## 2021-09-07 RX ADMIN — Medication 1 PATCH: at 20:41

## 2021-09-07 RX ADMIN — HEPARIN SODIUM 5000 UNIT(S): 5000 INJECTION INTRAVENOUS; SUBCUTANEOUS at 13:00

## 2021-09-07 RX ADMIN — Medication 1 MILLIGRAM(S): at 12:00

## 2021-09-07 RX ADMIN — Medication 1000 UNIT(S): at 12:00

## 2021-09-07 RX ADMIN — POLYETHYLENE GLYCOL 3350 17 GRAM(S): 17 POWDER, FOR SOLUTION ORAL at 12:00

## 2021-09-07 RX ADMIN — Medication 40 MILLIGRAM(S): at 05:22

## 2021-09-07 RX ADMIN — BUPROPION HYDROCHLORIDE 150 MILLIGRAM(S): 150 TABLET, EXTENDED RELEASE ORAL at 11:59

## 2021-09-07 RX ADMIN — SEVELAMER CARBONATE 800 MILLIGRAM(S): 2400 POWDER, FOR SUSPENSION ORAL at 11:59

## 2021-09-07 RX ADMIN — ALBUTEROL 2 PUFF(S): 90 AEROSOL, METERED ORAL at 16:53

## 2021-09-07 RX ADMIN — SIMETHICONE 80 MILLIGRAM(S): 80 TABLET, CHEWABLE ORAL at 21:34

## 2021-09-07 RX ADMIN — Medication 10 MILLIGRAM(S): at 05:21

## 2021-09-07 RX ADMIN — CHLORHEXIDINE GLUCONATE 1 APPLICATION(S): 213 SOLUTION TOPICAL at 21:35

## 2021-09-07 RX ADMIN — Medication 10 MILLIGRAM(S): at 17:28

## 2021-09-07 RX ADMIN — Medication 125 MICROGRAM(S): at 05:22

## 2021-09-07 RX ADMIN — HEPARIN SODIUM 5000 UNIT(S): 5000 INJECTION INTRAVENOUS; SUBCUTANEOUS at 21:34

## 2021-09-07 RX ADMIN — BUDESONIDE AND FORMOTEROL FUMARATE DIHYDRATE 2 PUFF(S): 160; 4.5 AEROSOL RESPIRATORY (INHALATION) at 21:35

## 2021-09-07 RX ADMIN — Medication 325 MILLIGRAM(S): at 11:59

## 2021-09-07 RX ADMIN — DIVALPROEX SODIUM 500 MILLIGRAM(S): 500 TABLET, DELAYED RELEASE ORAL at 17:27

## 2021-09-07 RX ADMIN — DIVALPROEX SODIUM 500 MILLIGRAM(S): 500 TABLET, DELAYED RELEASE ORAL at 08:13

## 2021-09-07 RX ADMIN — FINASTERIDE 5 MILLIGRAM(S): 5 TABLET, FILM COATED ORAL at 12:00

## 2021-09-07 RX ADMIN — ALBUTEROL 2 PUFF(S): 90 AEROSOL, METERED ORAL at 08:13

## 2021-09-07 RX ADMIN — CALCITRIOL 0.25 MICROGRAM(S): 0.5 CAPSULE ORAL at 12:00

## 2021-09-07 RX ADMIN — Medication 81 MILLIGRAM(S): at 11:59

## 2021-09-07 RX ADMIN — LIDOCAINE 1 PATCH: 4 CREAM TOPICAL at 00:33

## 2021-09-07 RX ADMIN — HEPARIN SODIUM 5000 UNIT(S): 5000 INJECTION INTRAVENOUS; SUBCUTANEOUS at 05:21

## 2021-09-07 RX ADMIN — SEVELAMER CARBONATE 800 MILLIGRAM(S): 2400 POWDER, FOR SUSPENSION ORAL at 17:27

## 2021-09-07 RX ADMIN — SEVELAMER CARBONATE 800 MILLIGRAM(S): 2400 POWDER, FOR SUSPENSION ORAL at 08:13

## 2021-09-07 RX ADMIN — RISPERIDONE 2 MILLIGRAM(S): 4 TABLET ORAL at 21:34

## 2021-09-07 RX ADMIN — LIDOCAINE 1 PATCH: 4 CREAM TOPICAL at 20:41

## 2021-09-07 RX ADMIN — MUPIROCIN 1 APPLICATION(S): 20 OINTMENT TOPICAL at 05:22

## 2021-09-07 RX ADMIN — SENNA PLUS 2 TABLET(S): 8.6 TABLET ORAL at 21:35

## 2021-09-07 RX ADMIN — MUPIROCIN 1 APPLICATION(S): 20 OINTMENT TOPICAL at 17:28

## 2021-09-07 RX ADMIN — Medication 1 PATCH: at 12:35

## 2021-09-07 RX ADMIN — TAMSULOSIN HYDROCHLORIDE 0.8 MILLIGRAM(S): 0.4 CAPSULE ORAL at 21:35

## 2021-09-07 RX ADMIN — LIDOCAINE 1 PATCH: 4 CREAM TOPICAL at 12:00

## 2021-09-07 RX ADMIN — SIMVASTATIN 20 MILLIGRAM(S): 20 TABLET, FILM COATED ORAL at 21:34

## 2021-09-07 RX ADMIN — BUDESONIDE AND FORMOTEROL FUMARATE DIHYDRATE 2 PUFF(S): 160; 4.5 AEROSOL RESPIRATORY (INHALATION) at 09:02

## 2021-09-07 RX ADMIN — ALBUTEROL 2 PUFF(S): 90 AEROSOL, METERED ORAL at 12:01

## 2021-09-07 RX ADMIN — PANTOPRAZOLE SODIUM 40 MILLIGRAM(S): 20 TABLET, DELAYED RELEASE ORAL at 05:22

## 2021-09-07 RX ADMIN — Medication 1 PATCH: at 07:00

## 2021-09-07 NOTE — CHART NOTE - NSCHARTNOTESELECT_GEN_ALL_CORE
Event Note
Event Note
Family Update/Event Note
Family Update/Event Note
family update note/Event Note
phone call/Event Note
Family Update/Event Note
Family update note/Event Note
Family update note/Event Note

## 2021-09-07 NOTE — PROGRESS NOTE ADULT - PROBLEM SELECTOR PLAN 11
Pt admitted from [facility]  PT recommends TG  likely D/c to [facility] per Pt, pending auth  Covid negative 9/6  CM following
Pt admitted from [facility]  PT recommends TG  Care management following for discharge planning  likely D/c to [facility] per Pt
Pt admitted from [facility]  PT consulted for discharge recommendations  Care management following for discharge planning
Pt admitted from [facility]  PT consulted for discharge recommendations  Care management following for discharge planning
Pt admitted from [facility]  PT recommends TG  likely D/c to [facility] per Pt  Covid negative 9/6  CM following
Pt admitted from [facility]  PT recommends TG  Care management following for discharge planning

## 2021-09-07 NOTE — PROGRESS NOTE ADULT - SUBJECTIVE AND OBJECTIVE BOX
C A R D I O L O G Y  *********************    DATE OF SERVICE: 09-07-21    Patient denies chest pain or shortness of breath.   Review of symptoms otherwise negative.    acetaminophen   Tablet .. 1000 milliGRAM(s) Oral every 8 hours PRN  ALBUTerol    90 MICROgram(s) HFA Inhaler 2 Puff(s) Inhalation <User Schedule>  artificial  tears Solution 1 Drop(s) Right EYE every 6 hours PRN  aspirin enteric coated 81 milliGRAM(s) Oral daily  budesonide 160 MICROgram(s)/formoterol 4.5 MICROgram(s) Inhaler 2 Puff(s) Inhalation two times a day  buPROPion XL (24-Hour) . 150 milliGRAM(s) Oral daily  busPIRone 10 milliGRAM(s) Oral two times a day  calcitriol   Capsule 0.25 MICROGram(s) Oral daily  chlorhexidine 2% Cloths 1 Application(s) Topical <User Schedule>  cholecalciferol 1000 Unit(s) Oral daily  diVALproex  milliGRAM(s) Oral <User Schedule>  ferrous    sulfate 325 milliGRAM(s) Oral daily  finasteride 5 milliGRAM(s) Oral daily  folic acid 1 milliGRAM(s) Oral daily  furosemide    Tablet 40 milliGRAM(s) Oral daily  heparin   Injectable 5000 Unit(s) SubCutaneous every 8 hours  levothyroxine 125 MICROGram(s) Oral daily  lidocaine   4% Patch 1 Patch Transdermal daily  mupirocin 2% Ointment 1 Application(s) Both Nostrils every 12 hours  nicotine -  14 mG/24Hr(s) Patch 1 patch Transdermal daily  pantoprazole    Tablet 40 milliGRAM(s) Oral before breakfast  polyethylene glycol 3350 17 Gram(s) Oral daily  risperiDONE   Tablet 2 milliGRAM(s) Oral <User Schedule>  senna 2 Tablet(s) Oral at bedtime  sevelamer carbonate 800 milliGRAM(s) Oral three times a day with meals  simvastatin 20 milliGRAM(s) Oral at bedtime  tamsulosin 0.8 milliGRAM(s) Oral at bedtime                            10.9   8.12  )-----------( 189      ( 07 Sep 2021 08:30 )             34.3       Hemoglobin: 10.9 g/dL (09-07 @ 08:30)  Hemoglobin: 10.5 g/dL (09-06 @ 03:51)  Hemoglobin: 10.5 g/dL (09-05 @ 10:06)  Hemoglobin: 10.6 g/dL (09-04 @ 08:41)  Hemoglobin: 10.9 g/dL (09-03 @ 07:20)      09-07    143  |  107  |  72<H>  ----------------------------<  93  4.3   |  29  |  3.67<H>    Ca    9.5      07 Sep 2021 08:30  Phos  4.5     09-07  Mg     2.5     09-07    TPro  6.9  /  Alb  2.4<L>  /  TBili  0.3  /  DBili  x   /  AST  6<L>  /  ALT  10  /  AlkPhos  78  09-07    Creatinine Trend: 3.67<--, 3.66<--, 3.54<--, 3.38<--, 3.65<--, 3.62<--    COAGS:           T(C): 36.4 (09-07-21 @ 05:17), Max: 37.4 (09-06-21 @ 21:07)  HR: 84 (09-07-21 @ 08:39) (77 - 84)  BP: 127/62 (09-07-21 @ 05:17) (121/58 - 127/62)  RR: 18 (09-07-21 @ 05:17) (16 - 18)  SpO2: 93% (09-07-21 @ 08:39) (93% - 98%)  Wt(kg): --    I&O's Summary    06 Sep 2021 07:01  -  07 Sep 2021 07:00  --------------------------------------------------------  IN: 0 mL / OUT: 4120 mL / NET: -4120 mL    07 Sep 2021 07:01  -  07 Sep 2021 11:23  --------------------------------------------------------  IN: 0 mL / OUT: 1175 mL / NET: -1175 mL          Gen: pleasant overweight white male in no acute distress, cooperative.  HEENT:  (-)icterus (-)pallor  CV: N S1 S2 1/6 ABIGAIL (+)2 Pulses B/l  Resp:  Clear to ausculatation B/L, normal effort  GI: (+) BS Soft, NT, ND  Lymph:  severe  edema, (-)obvious lymphadenopathy  Skin: Warm to touch, Normal turgor  Psych: Appropriate mood and affect      ASSESSMENT/PLAN: 	61y Male morbid obesity from [facility] [place] ambulates w/ walker has past medical hx of COPD not on O2 at home, hypothyroidism, anemia, left DVT, CKD, BPH, HLD, bipolar disorder, ambulatory dysfunction, lymphedema was BIBEMS as patient was complaining of worsening lower extremity weakness and  lightheadedness    - Recovering well after Narcan for opiate toxicity.  Cont tele  - Echo is overall reassuring.  - Work toward discharge back to assisted living.  No opposition to discharge/xfer when available.  - No angina or CHF  - Clinical presentation is sequale of a primary pulmonary process.  Trace trop and elevated BNP not specific in the setting of CKG  - He has no angina at this point.  Can consider Stress as an oupt when he is fully recovered    Austen Javier MD, [facility]  BEEPER [phone]

## 2021-09-07 NOTE — PROGRESS NOTE ADULT - ASSESSMENT
61 year old man with morbid obesity from [facility] [place] ambulates w/ walker has past medical hx of COPD not on O2 at home, hypothyroidism, anemia, left DVT, CKD, BPH, HLD, bipolar disorder, initially admitted for increased lethargy is admitted for hypercapnic respiratroy failure requiring BIPAP.  On 8/31, pt was noted to be increasingly lethargic and drowsy and was hypotensive as well. Rapid response team was called. Patient was only opening eyes to painful stimuli. Pinpoint pupils were noted. Chart review showed he received Dilaudid 4mg PO in the afternoon, Narcan 0.4mg was given and pt opened eyes.   ABG shows PCO2 of 100 for which pt was placed on BiPAP in ICU and improved. IV Lasix 40 mg added for euvolemic status. Pt was able to drink water and take pills 9/1, hence diet advanced to soft. Latest ABG 7.22/62/83/25 on 4 liters NC, hence patient is being downgraded to AI service.     9/2 - Failed TOV, daley reinserted  9/4 - PT rec TG, pending choices, will likely go to [facility] per pt  9/7, R buttock wound noted, dressing applied by RN, wound consult placed. Daley D/c, voiding without any difficulty

## 2021-09-07 NOTE — PROGRESS NOTE ADULT - PROBLEM SELECTOR PLAN 1
CXR noted above  SPO2 93% on 2L via n/c, saturating well with NC  Pt tolerating bipap at night as needed  Pt takes Albuterol and Symbicort at home  Continue home regimen   Monitor oxygen saturations

## 2021-09-07 NOTE — PROGRESS NOTE ADULT - SUBJECTIVE AND OBJECTIVE BOX
NEPHROLOGY MEDICAL CARE, [facility] - Dr. Taj Valenzuela/ Dr. Hazel Solis/ Dr. Cosmo Root/ Dr. Fang Gifford    Patient was seen and examined at bedside.    CC: patient is okay.     Vital Signs Last 24 Hrs  T(C): 36.7 (07 Sep 2021 12:24), Max: 37.4 (06 Sep 2021 21:07)  T(F): 98 (07 Sep 2021 12:24), Max: 99.3 (06 Sep 2021 21:07)  HR: 78 (07 Sep 2021 12:24) (78 - 84)  BP: 112/64 (07 Sep 2021 12:24) (112/64 - 127/62)  BP(mean): 77 (06 Sep 2021 21:07) (77 - 77)  RR: 15 (07 Sep 2021 12:24) (15 - 18)  SpO2: 94% (07 Sep 2021 12:24) (93% - 98%)    09-06 @ 07:01 - 09-07 @ 07:00  --------------------------------------------------------  IN: 0 mL / OUT: 4120 mL / NET: -4120 mL    09-07 @ 07:01  -  09-07 @ 13:41  --------------------------------------------------------  IN: 0 mL / OUT: 1175 mL / NET: -1175 mL        PHYSICAL EXAM:  General: No acute respiratory distress; obese  Eyes: conjunctiva and sclera clear  ENMT: Atraumatic, Normocephalic, supple, No JVD present. Moist mucous membranes  Respiratory: b/l poor air entry; No rales, rhonchi, wheezing  Cardiovasular: S1S2+; no m/r/g  Gastrointestinal: Soft, Non-tender, Nondistended; Bowel sounds present  Neuro:  Awake, Alert & Oriented X3  Ext:  2+ pedal edema with chronic venous stasis; No Cyanosis  Skin: chronic vascular changes of the b/l legs.      MEDICATIONS:  MEDICATIONS  (STANDING):  ALBUTerol    90 MICROgram(s) HFA Inhaler 2 Puff(s) Inhalation <User Schedule>  aspirin enteric coated 81 milliGRAM(s) Oral daily  budesonide 160 MICROgram(s)/formoterol 4.5 MICROgram(s) Inhaler 2 Puff(s) Inhalation two times a day  buPROPion XL (24-Hour) . 150 milliGRAM(s) Oral daily  busPIRone 10 milliGRAM(s) Oral two times a day  calcitriol   Capsule 0.25 MICROGram(s) Oral daily  chlorhexidine 2% Cloths 1 Application(s) Topical <User Schedule>  cholecalciferol 1000 Unit(s) Oral daily  diVALproex  milliGRAM(s) Oral <User Schedule>  ferrous    sulfate 325 milliGRAM(s) Oral daily  finasteride 5 milliGRAM(s) Oral daily  folic acid 1 milliGRAM(s) Oral daily  furosemide    Tablet 40 milliGRAM(s) Oral daily  heparin   Injectable 5000 Unit(s) SubCutaneous every 8 hours  levothyroxine 125 MICROGram(s) Oral daily  lidocaine   4% Patch 1 Patch Transdermal daily  mupirocin 2% Ointment 1 Application(s) Both Nostrils every 12 hours  nicotine -  14 mG/24Hr(s) Patch 1 patch Transdermal daily  pantoprazole    Tablet 40 milliGRAM(s) Oral before breakfast  polyethylene glycol 3350 17 Gram(s) Oral daily  risperiDONE   Tablet 2 milliGRAM(s) Oral <User Schedule>  senna 2 Tablet(s) Oral at bedtime  sevelamer carbonate 800 milliGRAM(s) Oral three times a day with meals  simvastatin 20 milliGRAM(s) Oral at bedtime  tamsulosin 0.8 milliGRAM(s) Oral at bedtime    MEDICATIONS  (PRN):  acetaminophen   Tablet .. 1000 milliGRAM(s) Oral every 8 hours PRN Moderate Pain (4 - 6)  artificial  tears Solution 1 Drop(s) Right EYE every 6 hours PRN Dry Eyes          LABS:                        10.9   8.12  )-----------( 189      ( 07 Sep 2021 08:30 )             34.3     09-07    143  |  107  |  72<H>  ----------------------------<  93  4.3   |  29  |  3.67<H>    Ca    9.5      07 Sep 2021 08:30  Phos  4.5     09-07  Mg     2.5     09-07    TPro  6.9  /  Alb  2.4<L>  /  TBili  0.3  /  DBili  x   /  AST  6<L>  /  ALT  10  /  AlkPhos  78  09-07        Magnesium, Serum: 2.5 mg/dL (09-07 @ 08:30)  Phosphorus Level, Serum: 4.5 mg/dL (09-07 @ 08:30)    Urine studies    PTH and Vit D:

## 2021-09-07 NOTE — PROGRESS NOTE ADULT - SUBJECTIVE AND OBJECTIVE BOX
GAYLA PEARCE    SCU progress note    INTERVAL HPI/OVERNIGHT EVENTS: No new complaints    DNR [ ]   DNI  [  ] FUll CODE    Covid - 19 PCR: negative since 9/6/21    The 4Ms    What Matters Most: see GOC  Age appropriate Medications/Screen for High Risk Medication: Yes  Mentation: see CAM below  Mobility: defer to physical exam    The Confusion Assessment Method (CAM) Diagnostic Algorithm     1: Acute Onset or Fluctuating Course  - Is there evidence of an acute change in mental status from the patient’s baseline? Did the (abnormal) behavior  fluctuate during the day, that is, tend to come and go, or increase and decrease in severity?  [ ] YES [ x] NO     2: Inattention  - Did the patient have difficulty focusing attention, being easily distractible, or having difficulty keeping track of what was being said?  [ ] YES [ x] NO     3: Disorganized thinking  -Was the patient’s thinking disorganized or incoherent, such as rambling or irrelevant conversation, unclear or illogical flow of ideas, or unpredictable switching from subject to subject?  [ ] YES [x ] NO    4: Altered Level of consciousness?  [ ] YES [ x] NO    The diagnosis of delirium by CAM requires the presence of features 1 and 2 and either 3 or 4.    PRESSORS: [ ] YES [x ] NO    Cardiovascular:  Heart Failure  Acute   Acute on Chronic  Chronic       furosemide    Tablet 40 milliGRAM(s) Oral daily  tamsulosin 0.8 milliGRAM(s) Oral at bedtime    Pulmonary:  ALBUTerol    90 MICROgram(s) HFA Inhaler 2 Puff(s) Inhalation <User Schedule>  budesonide 160 MICROgram(s)/formoterol 4.5 MICROgram(s) Inhaler 2 Puff(s) Inhalation two times a day    Hematalogic:  aspirin enteric coated 81 milliGRAM(s) Oral daily  heparin   Injectable 5000 Unit(s) SubCutaneous every 8 hours    Other:  acetaminophen   Tablet .. 1000 milliGRAM(s) Oral every 8 hours PRN  artificial  tears Solution 1 Drop(s) Right EYE every 6 hours PRN  buPROPion XL (24-Hour) . 150 milliGRAM(s) Oral daily  busPIRone 10 milliGRAM(s) Oral two times a day  calcitriol   Capsule 0.25 MICROGram(s) Oral daily  chlorhexidine 2% Cloths 1 Application(s) Topical <User Schedule>  cholecalciferol 1000 Unit(s) Oral daily  diVALproex  milliGRAM(s) Oral <User Schedule>  ferrous    sulfate 325 milliGRAM(s) Oral daily  finasteride 5 milliGRAM(s) Oral daily  folic acid 1 milliGRAM(s) Oral daily  levothyroxine 125 MICROGram(s) Oral daily  lidocaine   4% Patch 1 Patch Transdermal daily  mupirocin 2% Ointment 1 Application(s) Both Nostrils every 12 hours  nicotine -  14 mG/24Hr(s) Patch 1 patch Transdermal daily  pantoprazole    Tablet 40 milliGRAM(s) Oral before breakfast  polyethylene glycol 3350 17 Gram(s) Oral daily  risperiDONE   Tablet 2 milliGRAM(s) Oral <User Schedule>  senna 2 Tablet(s) Oral at bedtime  sevelamer carbonate 800 milliGRAM(s) Oral three times a day with meals  simvastatin 20 milliGRAM(s) Oral at bedtime    acetaminophen   Tablet .. 1000 milliGRAM(s) Oral every 8 hours PRN  ALBUTerol    90 MICROgram(s) HFA Inhaler 2 Puff(s) Inhalation <User Schedule>  artificial  tears Solution 1 Drop(s) Right EYE every 6 hours PRN  aspirin enteric coated 81 milliGRAM(s) Oral daily  budesonide 160 MICROgram(s)/formoterol 4.5 MICROgram(s) Inhaler 2 Puff(s) Inhalation two times a day  buPROPion XL (24-Hour) . 150 milliGRAM(s) Oral daily  busPIRone 10 milliGRAM(s) Oral two times a day  calcitriol   Capsule 0.25 MICROGram(s) Oral daily  chlorhexidine 2% Cloths 1 Application(s) Topical <User Schedule>  cholecalciferol 1000 Unit(s) Oral daily  diVALproex  milliGRAM(s) Oral <User Schedule>  ferrous    sulfate 325 milliGRAM(s) Oral daily  finasteride 5 milliGRAM(s) Oral daily  folic acid 1 milliGRAM(s) Oral daily  furosemide    Tablet 40 milliGRAM(s) Oral daily  heparin   Injectable 5000 Unit(s) SubCutaneous every 8 hours  levothyroxine 125 MICROGram(s) Oral daily  lidocaine   4% Patch 1 Patch Transdermal daily  mupirocin 2% Ointment 1 Application(s) Both Nostrils every 12 hours  nicotine -  14 mG/24Hr(s) Patch 1 patch Transdermal daily  pantoprazole    Tablet 40 milliGRAM(s) Oral before breakfast  polyethylene glycol 3350 17 Gram(s) Oral daily  risperiDONE   Tablet 2 milliGRAM(s) Oral <User Schedule>  senna 2 Tablet(s) Oral at bedtime  sevelamer carbonate 800 milliGRAM(s) Oral three times a day with meals  simvastatin 20 milliGRAM(s) Oral at bedtime  tamsulosin 0.8 milliGRAM(s) Oral at bedtime    Drug Dosing Weight  Height (cm): 172.7 (30 Aug 2021 08:52)  Weight (kg): 135.1 (31 Aug 2021 22:45)  BMI (kg/m2): 45.3 (31 Aug 2021 22:45)  BSA (m2): 2.42 (31 Aug 2021 22:45)    CENTRAL LINE: [ ] YES [x ] NO     HATFIELD: [ ] YES [x ] NO        PAST MEDICAL & SURGICAL HISTORY:  Hypothyroid    Hyperlipidemia    Ambulatory dysfunction    Anemia    History of BPH    CKD (chronic kidney disease)    Chronic obstructive pulmonary disease (COPD)    Bipolar disorder    09-06 @ 07:01  -  09-07 @ 07:00  --------------------------------------------------------  IN: 0 mL / OUT: 4120 mL / NET: -4120 mL    PHYSICAL EXAM:    GENERAL: NAD, well-groomed, well-developed  HEAD:  Atraumatic, Normocephalic  EYES: EOMI, PERRLA, conjunctiva and sclera clear  ENMT: No tonsillar erythema, exudates, or enlargement; Moist mucous membranes, Good dentition, No lesions  NECK: Supple, No JVD, Normal thyroid  NERVOUS SYSTEM:  Alert & Oriented X3, Good concentration; Motor Strength 5/5 B/L upper and lower extremities; DTRs 2+ intact and symmetric  CHEST/LUNG: Clear to percussion bilaterally; No rales, rhonchi, wheezing, or rubs  HEART: Regular rate and rhythm; No murmurs, rubs, or gallops  ABDOMEN: Soft, Nontender, Nondistended; Bowel sounds present  EXTREMITIES:  2+ edema BLE  LYMPH: No lymphadenopathy noted  SKIN: R buttock wound, L hip area with dressing CDI      LABS:  CBC Full  -  ( 07 Sep 2021 08:30 )  WBC Count : 8.12 K/uL  RBC Count : 3.34 M/uL  Hemoglobin : 10.9 g/dL  Hematocrit : 34.3 %  Platelet Count - Automated : 189 K/uL  Mean Cell Volume : 102.7 fl  Mean Cell Hemoglobin : 32.6 pg  Mean Cell Hemoglobin Concentration : 31.8 gm/dL  Auto Neutrophil # : x  Auto Lymphocyte # : x  Auto Monocyte # : x  Auto Eosinophil # : x  Auto Basophil # : x  Auto Neutrophil % : x  Auto Lymphocyte % : x  Auto Monocyte % : x  Auto Eosinophil % : x  Auto Basophil % : x    09-07    143  |  107  |  72<H>  ----------------------------<  93  4.3   |  29  |  3.67<H>    Ca    9.5      07 Sep 2021 08:30  Phos  4.5     09-07  Mg     2.5     09-07    TPro  6.9  /  Alb  2.4<L>  /  TBili  0.3  /  DBili  x   /  AST  6<L>  /  ALT  10  /  AlkPhos  78  09-07    [ x ]  DVT Prophylaxis  [  ]  Nutrition, Brand, Taking PO            Morbid Obesity BMI >/=40    RADIOLOGY & ADDITIONAL STUDIES:  < from: Xray Chest 1 View- PORTABLE-Routine (Xray Chest 1 View- PORTABLE-Routine in AM.) (09.01.21 @ 07:00) >  EXAM:  XR CHEST PORTABLE ROUTINE 1V                            PROCEDURE DATE:  09/01/2021          INTERPRETATION:  Chest one view    HISTORY: Pulmonary edema    COMPARISON STUDY: 8/30/2021    Frontal expiratory view of the chest shows the heart to be similar in size. The lungs show mild bilateral atelectasis. Pulmonary vascularity is within normal limits and there is no evidence of pneumothorax nor pleural effusion. Elevated left hemidiaphragm is again noted.    IMPRESSION:  Atelectasis. No evidence of pulmonary edema.        Thank you for the courtesy of this referral.    --- End of Report ---    < end of copied text >      Goals of Care Discussion with Family/Proxy/Other   - see note from/family meeting set up for

## 2021-09-07 NOTE — PROGRESS NOTE ADULT - ASSESSMENT
1. SHEYLA due to r/o ATN due to possible CRS  -scr is stable with stable electrolytes and close to baseline. continue lasix 40mg oral daily.   -UA shows some proteinuria. -resend urine lytes (uosm, urine sodium, urine creatinine, chloride, potassium), spot protein to creatinine ratio  - renal sonogram shows no hydronephrosis with Right kidney 11.8cm and Left kidney 12.0cm with increase echogenicity   -Adjust meds to eGFR and avoid IV Gadolinium contrast,NSAIDs, and phosphate enema.  -Monitor I/O's daily.   -Monitor SMA daily.  2. CKD stage 4 most likely due to ischemic nephropathy  -baseline scr around 3.1mg/dL  -Keep patient euvolemic and renal diet  -Avoid Nephrotoxic Meds/ Agents such as (NSAIDs, IV contrast, Aminoglycosides such as gentamicin, -Gadolinium contrast, Phosphate containing enemas, etc..)  -Adjust Medications according to eGFR  3. Anemia:  -on iron tabs.   -F/u CBC daily  -transfuse if HB < 7.0.  3. Mineral Bone Disease:  -phos is elevated and on renvela 1 tab tid and Vitamin 25 OH is low and PTH intact is 330, on  calcitriol 0.25mcg daily.   4. Respiratory Acidosis: on bipap prn and echo noted. on lasix. cardiology consulted.     Discussed with patient in detail regarding the renal plan and care  Discussed the assessment and plan with Primary Team/Nurse

## 2021-09-07 NOTE — CHART NOTE - NSCHARTNOTEFT_GEN_A_CORE
- attempted to call brother proxy for update regarding pt's condition. unable to reach pts brother. Per pt, He is working and is difficult to reach, advised pt that he can call NP at any time for update.

## 2021-09-08 ENCOUNTER — TRANSCRIPTION ENCOUNTER (OUTPATIENT)
Age: 61
End: 2021-09-08

## 2021-09-08 VITALS
RESPIRATION RATE: 16 BRPM | TEMPERATURE: 98 F | OXYGEN SATURATION: 93 % | SYSTOLIC BLOOD PRESSURE: 117 MMHG | HEART RATE: 80 BPM | DIASTOLIC BLOOD PRESSURE: 65 MMHG

## 2021-09-08 DIAGNOSIS — Z71.89 OTHER SPECIFIED COUNSELING: ICD-10-CM

## 2021-09-08 DIAGNOSIS — I87.8 OTHER SPECIFIED DISORDERS OF VEINS: ICD-10-CM

## 2021-09-08 LAB
ALBUMIN SERPL ELPH-MCNC: 2.5 G/DL — LOW (ref 3.5–5)
ALP SERPL-CCNC: 82 U/L — SIGNIFICANT CHANGE UP (ref 40–120)
ALT FLD-CCNC: 12 U/L DA — SIGNIFICANT CHANGE UP (ref 10–60)
ANION GAP SERPL CALC-SCNC: 4 MMOL/L — LOW (ref 5–17)
AST SERPL-CCNC: 10 U/L — SIGNIFICANT CHANGE UP (ref 10–40)
BILIRUB SERPL-MCNC: 0.3 MG/DL — SIGNIFICANT CHANGE UP (ref 0.2–1.2)
BUN SERPL-MCNC: 75 MG/DL — HIGH (ref 7–18)
CALCIUM SERPL-MCNC: 9.6 MG/DL — SIGNIFICANT CHANGE UP (ref 8.4–10.5)
CHLORIDE SERPL-SCNC: 106 MMOL/L — SIGNIFICANT CHANGE UP (ref 96–108)
CO2 SERPL-SCNC: 31 MMOL/L — SIGNIFICANT CHANGE UP (ref 22–31)
CREAT SERPL-MCNC: 3.85 MG/DL — HIGH (ref 0.5–1.3)
GLUCOSE SERPL-MCNC: 96 MG/DL — SIGNIFICANT CHANGE UP (ref 70–99)
HCT VFR BLD CALC: 33.7 % — LOW (ref 39–50)
HGB BLD-MCNC: 10.7 G/DL — LOW (ref 13–17)
MCHC RBC-ENTMCNC: 31.8 GM/DL — LOW (ref 32–36)
MCHC RBC-ENTMCNC: 31.8 PG — SIGNIFICANT CHANGE UP (ref 27–34)
MCV RBC AUTO: 100.3 FL — HIGH (ref 80–100)
NRBC # BLD: 0 /100 WBCS — SIGNIFICANT CHANGE UP (ref 0–0)
PLATELET # BLD AUTO: 207 K/UL — SIGNIFICANT CHANGE UP (ref 150–400)
POTASSIUM SERPL-MCNC: 4.2 MMOL/L — SIGNIFICANT CHANGE UP (ref 3.5–5.3)
POTASSIUM SERPL-SCNC: 4.2 MMOL/L — SIGNIFICANT CHANGE UP (ref 3.5–5.3)
PROT SERPL-MCNC: 6.9 G/DL — SIGNIFICANT CHANGE UP (ref 6–8.3)
RBC # BLD: 3.36 M/UL — LOW (ref 4.2–5.8)
RBC # FLD: 13.5 % — SIGNIFICANT CHANGE UP (ref 10.3–14.5)
SODIUM SERPL-SCNC: 141 MMOL/L — SIGNIFICANT CHANGE UP (ref 135–145)
WBC # BLD: 7.66 K/UL — SIGNIFICANT CHANGE UP (ref 3.8–10.5)
WBC # FLD AUTO: 7.66 K/UL — SIGNIFICANT CHANGE UP (ref 3.8–10.5)

## 2021-09-08 PROCEDURE — 87086 URINE CULTURE/COLONY COUNT: CPT

## 2021-09-08 PROCEDURE — 83605 ASSAY OF LACTIC ACID: CPT

## 2021-09-08 PROCEDURE — 80061 LIPID PANEL: CPT

## 2021-09-08 PROCEDURE — 70450 CT HEAD/BRAIN W/O DYE: CPT | Mod: MA

## 2021-09-08 PROCEDURE — 83970 ASSAY OF PARATHORMONE: CPT

## 2021-09-08 PROCEDURE — 82310 ASSAY OF CALCIUM: CPT

## 2021-09-08 PROCEDURE — 87641 MR-STAPH DNA AMP PROBE: CPT

## 2021-09-08 PROCEDURE — 82962 GLUCOSE BLOOD TEST: CPT

## 2021-09-08 PROCEDURE — 85027 COMPLETE CBC AUTOMATED: CPT

## 2021-09-08 PROCEDURE — 76775 US EXAM ABDO BACK WALL LIM: CPT

## 2021-09-08 PROCEDURE — 85025 COMPLETE CBC W/AUTO DIFF WBC: CPT

## 2021-09-08 PROCEDURE — 94660 CPAP INITIATION&MGMT: CPT

## 2021-09-08 PROCEDURE — 97116 GAIT TRAINING THERAPY: CPT

## 2021-09-08 PROCEDURE — 93005 ELECTROCARDIOGRAM TRACING: CPT

## 2021-09-08 PROCEDURE — 36415 COLL VENOUS BLD VENIPUNCTURE: CPT

## 2021-09-08 PROCEDURE — 84100 ASSAY OF PHOSPHORUS: CPT

## 2021-09-08 PROCEDURE — 93970 EXTREMITY STUDY: CPT

## 2021-09-08 PROCEDURE — 99285 EMERGENCY DEPT VISIT HI MDM: CPT

## 2021-09-08 PROCEDURE — 81001 URINALYSIS AUTO W/SCOPE: CPT

## 2021-09-08 PROCEDURE — 97110 THERAPEUTIC EXERCISES: CPT

## 2021-09-08 PROCEDURE — 93306 TTE W/DOPPLER COMPLETE: CPT

## 2021-09-08 PROCEDURE — 80048 BASIC METABOLIC PNL TOTAL CA: CPT

## 2021-09-08 PROCEDURE — 84443 ASSAY THYROID STIM HORMONE: CPT

## 2021-09-08 PROCEDURE — 85379 FIBRIN DEGRADATION QUANT: CPT

## 2021-09-08 PROCEDURE — 97530 THERAPEUTIC ACTIVITIES: CPT

## 2021-09-08 PROCEDURE — 84484 ASSAY OF TROPONIN QUANT: CPT

## 2021-09-08 PROCEDURE — 83036 HEMOGLOBIN GLYCOSYLATED A1C: CPT

## 2021-09-08 PROCEDURE — 71045 X-RAY EXAM CHEST 1 VIEW: CPT

## 2021-09-08 PROCEDURE — 80053 COMPREHEN METABOLIC PANEL: CPT

## 2021-09-08 PROCEDURE — 87640 STAPH A DNA AMP PROBE: CPT

## 2021-09-08 PROCEDURE — 94640 AIRWAY INHALATION TREATMENT: CPT

## 2021-09-08 PROCEDURE — 86769 SARS-COV-2 COVID-19 ANTIBODY: CPT

## 2021-09-08 PROCEDURE — 82306 VITAMIN D 25 HYDROXY: CPT

## 2021-09-08 PROCEDURE — 82803 BLOOD GASES ANY COMBINATION: CPT

## 2021-09-08 PROCEDURE — 87635 SARS-COV-2 COVID-19 AMP PRB: CPT

## 2021-09-08 PROCEDURE — 83735 ASSAY OF MAGNESIUM: CPT

## 2021-09-08 PROCEDURE — 83880 ASSAY OF NATRIURETIC PEPTIDE: CPT

## 2021-09-08 RX ORDER — SEVELAMER CARBONATE 2400 MG/1
1 POWDER, FOR SUSPENSION ORAL
Qty: 0 | Refills: 0 | DISCHARGE
Start: 2021-09-08

## 2021-09-08 RX ORDER — CALCITRIOL 0.5 UG/1
1 CAPSULE ORAL
Qty: 0 | Refills: 0 | DISCHARGE
Start: 2021-09-08

## 2021-09-08 RX ORDER — NICOTINE POLACRILEX 2 MG
1 GUM BUCCAL
Qty: 0 | Refills: 0 | DISCHARGE
Start: 2021-09-08

## 2021-09-08 RX ORDER — PANTOPRAZOLE SODIUM 20 MG/1
1 TABLET, DELAYED RELEASE ORAL
Qty: 0 | Refills: 0 | DISCHARGE
Start: 2021-09-08

## 2021-09-08 RX ORDER — FUROSEMIDE 40 MG
1 TABLET ORAL
Qty: 0 | Refills: 0 | DISCHARGE
Start: 2021-09-08

## 2021-09-08 RX ORDER — LIDOCAINE 4 G/100G
1 CREAM TOPICAL
Qty: 0 | Refills: 0 | DISCHARGE
Start: 2021-09-08

## 2021-09-08 RX ADMIN — POLYETHYLENE GLYCOL 3350 17 GRAM(S): 17 POWDER, FOR SOLUTION ORAL at 11:31

## 2021-09-08 RX ADMIN — FINASTERIDE 5 MILLIGRAM(S): 5 TABLET, FILM COATED ORAL at 11:29

## 2021-09-08 RX ADMIN — CHLORHEXIDINE GLUCONATE 1 APPLICATION(S): 213 SOLUTION TOPICAL at 05:50

## 2021-09-08 RX ADMIN — LIDOCAINE 1 PATCH: 4 CREAM TOPICAL at 00:34

## 2021-09-08 RX ADMIN — MUPIROCIN 1 APPLICATION(S): 20 OINTMENT TOPICAL at 05:08

## 2021-09-08 RX ADMIN — Medication 1 PATCH: at 11:32

## 2021-09-08 RX ADMIN — Medication 1 PATCH: at 18:31

## 2021-09-08 RX ADMIN — ALBUTEROL 2 PUFF(S): 90 AEROSOL, METERED ORAL at 07:24

## 2021-09-08 RX ADMIN — ALBUTEROL 2 PUFF(S): 90 AEROSOL, METERED ORAL at 16:44

## 2021-09-08 RX ADMIN — CALCITRIOL 0.25 MICROGRAM(S): 0.5 CAPSULE ORAL at 11:29

## 2021-09-08 RX ADMIN — BUDESONIDE AND FORMOTEROL FUMARATE DIHYDRATE 2 PUFF(S): 160; 4.5 AEROSOL RESPIRATORY (INHALATION) at 09:49

## 2021-09-08 RX ADMIN — ALBUTEROL 2 PUFF(S): 90 AEROSOL, METERED ORAL at 11:31

## 2021-09-08 RX ADMIN — BUPROPION HYDROCHLORIDE 150 MILLIGRAM(S): 150 TABLET, EXTENDED RELEASE ORAL at 11:28

## 2021-09-08 RX ADMIN — SEVELAMER CARBONATE 800 MILLIGRAM(S): 2400 POWDER, FOR SUSPENSION ORAL at 17:25

## 2021-09-08 RX ADMIN — LIDOCAINE 1 PATCH: 4 CREAM TOPICAL at 18:30

## 2021-09-08 RX ADMIN — Medication 81 MILLIGRAM(S): at 11:28

## 2021-09-08 RX ADMIN — Medication 1 PATCH: at 11:30

## 2021-09-08 RX ADMIN — DIVALPROEX SODIUM 500 MILLIGRAM(S): 500 TABLET, DELAYED RELEASE ORAL at 17:26

## 2021-09-08 RX ADMIN — Medication 10 MILLIGRAM(S): at 17:25

## 2021-09-08 RX ADMIN — SEVELAMER CARBONATE 800 MILLIGRAM(S): 2400 POWDER, FOR SUSPENSION ORAL at 07:30

## 2021-09-08 RX ADMIN — Medication 1 MILLIGRAM(S): at 11:30

## 2021-09-08 RX ADMIN — SIMETHICONE 80 MILLIGRAM(S): 80 TABLET, CHEWABLE ORAL at 14:01

## 2021-09-08 RX ADMIN — PANTOPRAZOLE SODIUM 40 MILLIGRAM(S): 20 TABLET, DELAYED RELEASE ORAL at 06:01

## 2021-09-08 RX ADMIN — Medication 1 PATCH: at 08:34

## 2021-09-08 RX ADMIN — HEPARIN SODIUM 5000 UNIT(S): 5000 INJECTION INTRAVENOUS; SUBCUTANEOUS at 14:01

## 2021-09-08 RX ADMIN — LIDOCAINE 1 PATCH: 4 CREAM TOPICAL at 11:30

## 2021-09-08 RX ADMIN — HEPARIN SODIUM 5000 UNIT(S): 5000 INJECTION INTRAVENOUS; SUBCUTANEOUS at 05:08

## 2021-09-08 RX ADMIN — Medication 325 MILLIGRAM(S): at 11:29

## 2021-09-08 RX ADMIN — Medication 1000 UNIT(S): at 11:29

## 2021-09-08 RX ADMIN — SEVELAMER CARBONATE 800 MILLIGRAM(S): 2400 POWDER, FOR SUSPENSION ORAL at 11:33

## 2021-09-08 RX ADMIN — Medication 10 MILLIGRAM(S): at 05:08

## 2021-09-08 RX ADMIN — SIMETHICONE 80 MILLIGRAM(S): 80 TABLET, CHEWABLE ORAL at 05:08

## 2021-09-08 RX ADMIN — Medication 125 MICROGRAM(S): at 05:08

## 2021-09-08 RX ADMIN — DIVALPROEX SODIUM 500 MILLIGRAM(S): 500 TABLET, DELAYED RELEASE ORAL at 07:25

## 2021-09-08 RX ADMIN — Medication 40 MILLIGRAM(S): at 05:08

## 2021-09-08 NOTE — PROGRESS NOTE ADULT - PROVIDER SPECIALTY LIST ADULT
Cardiology
Cardiology
Nephrology
Cardiology
Critical Care
Internal Medicine
Nephrology
Nephrology
Cardiology
Internal Medicine
Nephrology
Critical Care
Pain Medicine
Critical Care
Pain Medicine
Critical Care
Critical Care
Internal Medicine
Critical Care

## 2021-09-08 NOTE — PROGRESS NOTE ADULT - SUBJECTIVE AND OBJECTIVE BOX
NEPHROLOGY MEDICAL CARE, [facility] - Dr. Taj Valenzuela/ Dr. Hazel Solis/ Dr. Cosmo Root/ Dr. Fang Gifford    Patient was seen and examined at bedside.    CC: patient is okay and no sob.    Vital Signs Last 24 Hrs  T(C): 36.6 (08 Sep 2021 12:23), Max: 37 (07 Sep 2021 20:39)  T(F): 97.9 (08 Sep 2021 12:23), Max: 98.6 (07 Sep 2021 20:39)  HR: 80 (08 Sep 2021 12:23) (71 - 80)  BP: 117/65 (08 Sep 2021 12:23) (116/65 - 120/81)  BP(mean): 82 (07 Sep 2021 20:39) (82 - 82)  RR: 16 (08 Sep 2021 12:23) (16 - 18)  SpO2: 93% (08 Sep 2021 12:23) (91% - 98%)    09-07 @ 07:01  -  09-08 @ 07:00  --------------------------------------------------------  IN: 0 mL / OUT: 1375 mL / NET: -1375 mL    09-08 @ 07:01  -  09-08 @ 13:59  --------------------------------------------------------  IN: 0 mL / OUT: 1310 mL / NET: -1310 mL        PHYSICAL EXAM:  General: No acute respiratory distress; obese  Eyes: conjunctiva and sclera clear  ENMT: Atraumatic, Normocephalic, supple, No JVD present. Moist mucous membranes  Respiratory: b/l poor air entry; No rales, rhonchi, wheezing  Cardiovasular: S1S2+; no m/r/g  Gastrointestinal: Soft, Non-tender, Nondistended; Bowel sounds present  Neuro:  Awake, Alert & Oriented X3  Ext:  2+ pedal edema with chronic venous stasis; No Cyanosis  Skin: chronic vascular changes of the b/l legs.    MEDICATIONS:  MEDICATIONS  (STANDING):  ALBUTerol    90 MICROgram(s) HFA Inhaler 2 Puff(s) Inhalation <User Schedule>  aspirin enteric coated 81 milliGRAM(s) Oral daily  budesonide 160 MICROgram(s)/formoterol 4.5 MICROgram(s) Inhaler 2 Puff(s) Inhalation two times a day  buPROPion XL (24-Hour) . 150 milliGRAM(s) Oral daily  busPIRone 10 milliGRAM(s) Oral two times a day  calcitriol   Capsule 0.25 MICROGram(s) Oral daily  chlorhexidine 2% Cloths 1 Application(s) Topical <User Schedule>  cholecalciferol 1000 Unit(s) Oral daily  diVALproex  milliGRAM(s) Oral <User Schedule>  ferrous    sulfate 325 milliGRAM(s) Oral daily  finasteride 5 milliGRAM(s) Oral daily  folic acid 1 milliGRAM(s) Oral daily  furosemide    Tablet 40 milliGRAM(s) Oral daily  heparin   Injectable 5000 Unit(s) SubCutaneous every 8 hours  levothyroxine 125 MICROGram(s) Oral daily  lidocaine   4% Patch 1 Patch Transdermal daily  nicotine -  14 mG/24Hr(s) Patch 1 patch Transdermal daily  pantoprazole    Tablet 40 milliGRAM(s) Oral before breakfast  polyethylene glycol 3350 17 Gram(s) Oral daily  risperiDONE   Tablet 2 milliGRAM(s) Oral <User Schedule>  senna 2 Tablet(s) Oral at bedtime  sevelamer carbonate 800 milliGRAM(s) Oral three times a day with meals  simethicone 80 milliGRAM(s) Chew three times a day  simvastatin 20 milliGRAM(s) Oral at bedtime  tamsulosin 0.8 milliGRAM(s) Oral at bedtime    MEDICATIONS  (PRN):  acetaminophen   Tablet .. 1000 milliGRAM(s) Oral every 8 hours PRN Moderate Pain (4 - 6)  artificial  tears Solution 1 Drop(s) Right EYE every 6 hours PRN Dry Eyes          LABS:                        10.7   7.66  )-----------( 207      ( 08 Sep 2021 07:57 )             33.7     09-08    141  |  106  |  75<H>  ----------------------------<  96  4.2   |  31  |  3.85<H>    Ca    9.6      08 Sep 2021 07:57  Phos  4.5     09-07  Mg     2.5     09-07    TPro  6.9  /  Alb  2.5<L>  /  TBili  0.3  /  DBili  x   /  AST  10  /  ALT  12  /  AlkPhos  82  09-08          Urine studies    PTH and Vit D:

## 2021-09-08 NOTE — PROGRESS NOTE ADULT - PROBLEM SELECTOR PLAN 5
- Patient with known lymphedema and PVD  - Will order one dose of Lasix 40 mg IVP to help with diuresis  - Monitor I and O, weight daily   - Diet fluid restriction 1L
Pt takes Risperidone, Wellbutrin and Buspar at home  Continue home regimen   Continue supportive care
Pt takes Risperidone, Wellbutrin and Buspar at home  Continue home regimen   Continue supportive care
- Patient with known lymphedema and PVD  - Will order one dose of Lasix 40 mg IVP to help with diuresis  - Monitor I and O, weight daily   - Diet fluid restriction 1L
Pt takes Risperidone, Wellbutrin and Buspar at home  Continue home regimen   Continue supportive care
Pt takes Risperidone, Wellbutrin and Buspar at home  Continue home regimen   Continue supportive care
Continue Risperidone, Wellbutrin and Buspar at home.  Continue home regimen
Pt takes Risperidone, Wellbutrin and Buspar at home  Continue home regimen   Continue supportive care
Pt takes Risperidone, Wellbutrin and Buspar at home  Continue home regimen   Continue supportive care

## 2021-09-08 NOTE — PROGRESS NOTE ADULT - PROBLEM SELECTOR PROBLEM 4
Lymphedema
Lymphedema
Acute kidney injury superimposed on CKD
Chronic venous stasis
Acute kidney injury superimposed on CKD
COPD (chronic obstructive pulmonary disease)
Acute kidney injury superimposed on CKD
Acute kidney injury superimposed on CKD
COPD (chronic obstructive pulmonary disease)

## 2021-09-08 NOTE — PROGRESS NOTE ADULT - SUBJECTIVE AND OBJECTIVE BOX
Patient is a 61y old  Male who presents with a chief complaint of SHEYLA ON CKD, DEMAND ISCHEMIA, AMBULATORY DYSFUNCTION (08 Sep 2021 13:59)    PATIENT IS SEEN AND EXAMINED IN MEDICAL FLOOR.  DORIST [    ]    ALYSIA [   ]      GT [   ]    ALLERGIES:  No Known Allergies      Daily     Daily Weight in k.9 (08 Sep 2021 05:04)    VITALS:    Vital Signs Last 24 Hrs  T(C): 36.6 (08 Sep 2021 12:23), Max: 37 (07 Sep 2021 20:39)  T(F): 97.9 (08 Sep 2021 12:23), Max: 98.6 (07 Sep 2021 20:39)  HR: 80 (08 Sep 2021 12:23) (71 - 80)  BP: 117/65 (08 Sep 2021 12:23) (116/65 - 120/81)  BP(mean): 82 (07 Sep 2021 20:39) (82 - 82)  RR: 16 (08 Sep 2021 12:23) (16 - 18)  SpO2: 93% (08 Sep 2021 12:23) (91% - 98%)    LABS:    CBC Full  -  ( 08 Sep 2021 07:57 )  WBC Count : 7.66 K/uL  RBC Count : 3.36 M/uL  Hemoglobin : 10.7 g/dL  Hematocrit : 33.7 %  Platelet Count - Automated : 207 K/uL  Mean Cell Volume : 100.3 fl  Mean Cell Hemoglobin : 31.8 pg  Mean Cell Hemoglobin Concentration : 31.8 gm/dL  Auto Neutrophil # : x  Auto Lymphocyte # : x  Auto Monocyte # : x  Auto Eosinophil # : x  Auto Basophil # : x  Auto Neutrophil % : x  Auto Lymphocyte % : x  Auto Monocyte % : x  Auto Eosinophil % : x  Auto Basophil % : x      08    141  |  106  |  75<H>  ----------------------------<  96  4.2   |  31  |  3.85<H>    Ca    9.6      08 Sep 2021 07:57  Phos  4.5     0907  Mg     2.5         TPro  6.9  /  Alb  2.5<L>  /  TBili  0.3  /  DBili  x   /  AST  10  /  ALT  12  /  AlkPhos  82  08    CAPILLARY BLOOD GLUCOSE      POCT Blood Glucose.: 150 mg/dL (08 Sep 2021 11:22)  POCT Blood Glucose.: 111 mg/dL (08 Sep 2021 07:46)  POCT Blood Glucose.: 134 mg/dL (07 Sep 2021 21:00)  POCT Blood Glucose.: 132 mg/dL (07 Sep 2021 16:32)        LIVER FUNCTIONS - ( 08 Sep 2021 07:57 )  Alb: 2.5 g/dL / Pro: 6.9 g/dL / ALK PHOS: 82 U/L / ALT: 12 U/L DA / AST: 10 U/L / GGT: x           Creatinine Trend: 3.85<--, 3.67<--, 3.66<--, 3.54<--, 3.38<--, 3.65<--  I&O's Summary    07 Sep 2021 07:01  -  08 Sep 2021 07:00  --------------------------------------------------------  IN: 0 mL / OUT: 1375 mL / NET: -1375 mL    08 Sep 2021 07:01  -  08 Sep 2021 15:27  --------------------------------------------------------  IN: 0 mL / OUT: 1310 mL / NET: -1310 mL            Clean Catch Clean Catch (Midstream)   @ 21:13   <10,000 CFU/mL Normal Urogenital Ivelisse  --  --          MEDICATIONS:    MEDICATIONS  (STANDING):  ALBUTerol    90 MICROgram(s) HFA Inhaler 2 Puff(s) Inhalation <User Schedule>  aspirin enteric coated 81 milliGRAM(s) Oral daily  budesonide 160 MICROgram(s)/formoterol 4.5 MICROgram(s) Inhaler 2 Puff(s) Inhalation two times a day  buPROPion XL (24-Hour) . 150 milliGRAM(s) Oral daily  busPIRone 10 milliGRAM(s) Oral two times a day  calcitriol   Capsule 0.25 MICROGram(s) Oral daily  chlorhexidine 2% Cloths 1 Application(s) Topical <User Schedule>  cholecalciferol 1000 Unit(s) Oral daily  diVALproex  milliGRAM(s) Oral <User Schedule>  ferrous    sulfate 325 milliGRAM(s) Oral daily  finasteride 5 milliGRAM(s) Oral daily  folic acid 1 milliGRAM(s) Oral daily  furosemide    Tablet 40 milliGRAM(s) Oral daily  heparin   Injectable 5000 Unit(s) SubCutaneous every 8 hours  levothyroxine 125 MICROGram(s) Oral daily  lidocaine   4% Patch 1 Patch Transdermal daily  nicotine -  14 mG/24Hr(s) Patch 1 patch Transdermal daily  pantoprazole    Tablet 40 milliGRAM(s) Oral before breakfast  polyethylene glycol 3350 17 Gram(s) Oral daily  risperiDONE   Tablet 2 milliGRAM(s) Oral <User Schedule>  senna 2 Tablet(s) Oral at bedtime  sevelamer carbonate 800 milliGRAM(s) Oral three times a day with meals  simethicone 80 milliGRAM(s) Chew three times a day  simvastatin 20 milliGRAM(s) Oral at bedtime  tamsulosin 0.8 milliGRAM(s) Oral at bedtime      MEDICATIONS  (PRN):  acetaminophen   Tablet .. 1000 milliGRAM(s) Oral every 8 hours PRN Moderate Pain (4 - 6)  artificial  tears Solution 1 Drop(s) Right EYE every 6 hours PRN Dry Eyes      REVIEW OF SYSTEMS:                           ALL ROS DONE [ X   ]    CONSTITUTIONAL:  LETHARGIC [   ], FEVER [   ], UNRESPONSIVE [   ]  CVS:  CP  [   ], SOB, [   ], PALPITATIONS [   ], DIZZYNESS [   ]  RS: COUGH [   ], SPUTUM [   ]  GI: ABDOMINAL PAIN [   ], NAUSEA [   ], VOMITINGS [   ], DIARRHEA [   ], CONSTIPATION [   ]  :  DYSURIA [   ], NOCTURIA [   ], INCREASED FREQUENCY [   ], DRIBLING [   ],  SKELETAL: PAINFUL JOINTS [   ], SWOLLEN JOINTS [   ], NECK ACHE [   ], LOW BACK ACHE [   ],  SKIN : ULCERS [   ], RASH [   ], ITCHING [   ]  CNS: HEAD ACHE [   ], DOUBLE VISION [   ], BLURRED VISION [   ], AMS / CONFUSION [   ], SEIZURES [   ], WEAKNESS [   ],TINGLING / NUMBNESS [   ]    PHYSICAL EXAMINATION:  GENERAL APPEARANCE: NO DISTRESS  HEENT:  NO PALLOR, NO  JVD,  NO   NODES, NECK SUPPLE  CVS: S1 +, S2 +,   RS: AEEB,  OCCASIONAL  RALES +,   NO RONCHI  ABD: SOFT, NT, NO, BS +  EXT: PE ++  SKIN: WARM,   SKELETAL:  ROM ACCEPTABLE  CNS:  AAO X 2     RADIOLOGY :    RADIOLOGY REVIEWED    ASSESSMENT :     Weakness    No pertinent past medical history    Hypothyroid    Hyperlipidemia    Schizophrenia    Schizoaffective disorder    Ambulatory dysfunction    Anemia    History of BPH    CKD (chronic kidney disease)    Chronic obstructive pulmonary disease (COPD)    Bipolar disorder    Bipolar disorder    No significant past surgical history        PLAN:  HPI:  61 year old man with morbid obesity from [facility] [place] ambulates w/ walker has past medical hx of COPD not on O2 at home, hypothyroidism, anemia, left DVT, CKD, BPH, HLD, bipolar disorder, ambulatory dysfunction, lymphedema was BIBEMS as patient was complaining of worsening lower extremity weakness, lightheaded and overall not feeling well for one day. Of note, patient with slurred speech at baseline since childhood and known ambulatory dysfunction reason why he is on IRA. Patient denies chest pain, palpitations, sob or other symptoms.      [facility] full code    (30 Aug 2021 17:02)    # PT EVAL RECOMMENDED TG - D/C PLAN TO [facility]    # RRT FOR AMS [] FOUND TO HAVE ACUTE HYPERCAPNIA - GIVEN NARCAN [PATIENT'S RESPONSIVENESS IMPROVED], NARCOTICS DISCONTINUED, TRANSFERRED TO ICU ON BIPAP  - PAIN MGMT CONSULT  # HYPOGLYCEMIA S/T POOR PO INTAKE - RESOLVED  # ELEVATED TROPONIN - TREND, ECHOCARDIOGRAM - W/ MILD PULM HTN, CARDIOLOGY CONSULT  # SHEYLA ON CKD - IMPROVING - PASSED TOV W/ ALYSIA [] - MONITOR CR, AVOID NEPHROTOXIC AGENTS - ? ATN  - MONITOR IS AND OS  - INCREASED FLOMAX, CONTINUE FINASTERIDE  - HOLD DIURETICS  - NEPHROLOGY CONSULT  # COPD  # HYPOTHYROIDISM  # MORBID OBESITY  # CHRONIC VENOUS INSUFFICIENCY, HX OF LEFT DVT    # GI AND DVT PPX

## 2021-09-08 NOTE — DISCHARGE NOTE NURSING/CASE MANAGEMENT/SOCIAL WORK - NSDCPEWEB_GEN_ALL_CORE
[facility] for Tobacco Control website --- [email or web address] website --- [email or web address]

## 2021-09-08 NOTE — PROGRESS NOTE ADULT - ASSESSMENT
1. SHEYLA due to r/o ATN due to possible CRS  -scr is slight increased but stable electrolytes and close to baseline. continue lasix 40mg oral daily.   -UA shows some proteinuria. -resend urine lytes (uosm, urine sodium, urine creatinine, chloride, potassium), spot protein to creatinine ratio  - renal sonogram shows no hydronephrosis with Right kidney 11.8cm and Left kidney 12.0cm with increase echogenicity   -Adjust meds to eGFR and avoid IV Gadolinium contrast,NSAIDs, and phosphate enema.  -Monitor I/O's daily.   -Monitor SMA daily.  2. CKD stage 4 most likely due to ischemic nephropathy  -baseline scr around 3.1mg/dL  -Keep patient euvolemic and renal diet  -Avoid Nephrotoxic Meds/ Agents such as (NSAIDs, IV contrast, Aminoglycosides such as gentamicin, -Gadolinium contrast, Phosphate containing enemas, etc..)  -Adjust Medications according to eGFR  3. Anemia:  -on iron tabs.   -F/u CBC daily  -transfuse if HB < 7.0.  3. Mineral Bone Disease:  -phos is elevated and on renvela 1 tab tid and Vitamin 25 OH is low and PTH intact is 330, on  calcitriol 0.25mcg daily.   4. Respiratory Acidosis: on bipap prn and echo noted. on lasix. cardiology consulted.     Discussed with patient in detail regarding the renal plan and care  Discussed the assessment and plan with Primary Team/Nurse

## 2021-09-08 NOTE — ADVANCED PRACTICE NURSE CONSULT - RECOMMEDATIONS
-Apply a Foam dressing to the Coccyx area Q 72hrs PRN, for protection  -Elevate/float the patients heels using heel protectors and reposition the patient Q 2hrs using wedges or pillows

## 2021-09-08 NOTE — PROGRESS NOTE ADULT - PROBLEM SELECTOR PLAN 4
SCr 3.38  Pt takes Renvela at home  Continue home regimen   Follow up CMP in AM
- Patient with hx of COPD not on O2 at home, not on exacerbation at this time   - C/w home meds
- Patient with hx of COPD not on O2 at home, not on exacerbation at this time   - C/w home meds
SCr 3.62  Pt takes Renvela at home  Continue home regimen   Follow up CMP in AM
SCr 3.38  Pt takes Renvela at home  Continue home regimen   Nepro following
SCr 3.38  Pt takes Renvela at home  Continue home regimen   Nepro following
Pt takes Renvela at home  avoid nephro toxins  Continue home regimen   Nepro following
SCr 3.65  Pt takes Renvela at home  Continue home regimen   Follow up CMP in AM
Continue daily lasix  Elevate legs when sitting.  Needs assistance with ambulation.

## 2021-09-08 NOTE — PROGRESS NOTE ADULT - PROBLEM SELECTOR PLAN 1
Secondary to COPD and likely TEJ/OHS  Continue BIPAP nightly.   Currently maintaining oxygen saturation off oxygen  Continue to monitor oxygen saturation  Continue bronchodilators and ICS

## 2021-09-08 NOTE — PROGRESS NOTE ADULT - PROBLEM SELECTOR PROBLEM 2
Acute kidney injury superimposed on CKD
Weakness
COPD (chronic obstructive pulmonary disease)
Acute kidney injury superimposed on CKD
Weakness
Weakness
Elevated troponin
Weakness
Weakness
Elevated troponin
Weakness

## 2021-09-08 NOTE — DISCHARGE NOTE NURSING/CASE MANAGEMENT/SOCIAL WORK - NSDCPEEMAIL_GEN_ALL_CORE
[facility] for Tobacco Control email [email or web address]

## 2021-09-08 NOTE — PROGRESS NOTE ADULT - PROBLEM SELECTOR PROBLEM 1
Chronic pain of left lower extremity
Acute kidney injury superimposed on CKD
Acute respiratory failure with hypercapnia
Acute respiratory failure with hypercapnia
Chronic pain of left lower extremity
Acute kidney injury superimposed on CKD
Acute respiratory failure with hypercapnia

## 2021-09-08 NOTE — DISCHARGE NOTE NURSING/CASE MANAGEMENT/SOCIAL WORK - BRAND OF COVID-19 VACCINATION
patient states that he got 2 doses in February and March but cannot recall which brand/Pfizer dose 1 and 2

## 2021-09-08 NOTE — DISCHARGE NOTE NURSING/CASE MANAGEMENT/SOCIAL WORK - PATIENT PORTAL LINK FT
You can access the FollowMyHealth Patient Portal offered by [facility] by registering at the following website: [email or web address]. By joining [employer]’s FollowMyHealth portal, you will also be able to view your health information using other applications (apps) compatible with our system.

## 2021-09-08 NOTE — PROGRESS NOTE ADULT - PROBLEM SELECTOR PROBLEM 6
COPD (chronic obstructive pulmonary disease)
COPD (chronic obstructive pulmonary disease)
Bipolar disorder
COPD (chronic obstructive pulmonary disease)
Anemia
COPD (chronic obstructive pulmonary disease)
Bipolar disorder
COPD (chronic obstructive pulmonary disease)
COPD (chronic obstructive pulmonary disease)

## 2021-09-08 NOTE — PROGRESS NOTE ADULT - PROBLEM SELECTOR PROBLEM 10
Prophylactic measure
Prophylactic measure
Advance care planning
Prophylactic measure

## 2021-09-08 NOTE — PROGRESS NOTE ADULT - PROBLEM SELECTOR PROBLEM 3
Ambulatory dysfunction
Acute kidney injury superimposed on CKD
Ambulatory dysfunction

## 2021-09-08 NOTE — PROGRESS NOTE ADULT - REASON FOR ADMISSION
SHEYLA ON CKD, DEMAND ISCHEMIA, AMBULATORY DYSFUNCTION

## 2021-09-08 NOTE — PROGRESS NOTE ADULT - PROBLEM SELECTOR PLAN 10
DVT PPX: Heparin   GI PPX: PPI
DVT and GI prophylaxis.  Nightly BIPAP  OOB daily   Medically stable for discharge.
IMPROVE VTE Individual Risk Assessment  RISK                                                                Points  [  ] Previous VTE                                                  3  [  ] Thrombophilia                                               2  [  ] Lower limb paralysis                                      2        (unable to hold up >15 seconds)    [  ] Current Cancer                                              2         (within 6 months)  [  ] Immobilization > 24 hrs                                1  [  ] ICU/CCU stay > 24 hours                              1  [  ] Age > 60                                                      1  IMPROVE VTE Score ____2_____    PPI for GI prophylaxis  Heparin for DVT PPX
IMPROVE VTE Individual Risk Assessment  RISK                                                                Points  [  ] Previous VTE                                                  3  [  ] Thrombophilia                                               2  [  ] Lower limb paralysis                                      2        (unable to hold up >15 seconds)    [  ] Current Cancer                                              2         (within 6 months)  [  ] Immobilization > 24 hrs                                1  [  ] ICU/CCU stay > 24 hours                              1  [  ] Age > 60                                                      1  IMPROVE VTE Score ____2_____    PPI for GI prophylaxis  Heparin for DVT PPX

## 2021-09-08 NOTE — PROGRESS NOTE ADULT - SUBJECTIVE AND OBJECTIVE BOX
GAYLA PEARCE    SCU progress note    INTERVAL HPI/OVERNIGHT EVENTS: ***Used BIPAP during the night    DNR [ ]   DNI  [  ]   FULL CODE    Covid - 19 PCR: Negative 9/6    The 4Ms    What Matters Most: see GOC  Age appropriate Medications/Screen for High Risk Medication: Yes  Mentation: see CAM below  Mobility: defer to physical exam    The Confusion Assessment Method (CAM) Diagnostic Algorithm     1: Acute Onset or Fluctuating Course  - Is there evidence of an acute change in mental status from the patient’s baseline? Did the (abnormal) behavior  fluctuate during the day, that is, tend to come and go, or increase and decrease in severity?  [ ] YES [x ] NO     2: Inattention  - Did the patient have difficulty focusing attention, being easily distractible, or having difficulty keeping track of what was being said?  [ ] YES [ x] NO     3: Disorganized thinking  -Was the patient’s thinking disorganized or incoherent, such as rambling or irrelevant conversation, unclear or illogical flow of ideas, or unpredictable switching from subject to subject?  [ ] YES [x ] NO    4: Altered Level of consciousness?  [ ] YES [x ] NO    The diagnosis of delirium by CAM requires the presence of features 1 and 2 and either 3 or 4.    PRESSORS: [ ] YES [x ] NO    Cardiovascular:  Heart Failure  Acute   Acute on Chronic  Chronic       furosemide    Tablet 40 milliGRAM(s) Oral daily  tamsulosin 0.8 milliGRAM(s) Oral at bedtime    Pulmonary:  ALBUTerol    90 MICROgram(s) HFA Inhaler 2 Puff(s) Inhalation <User Schedule>  budesonide 160 MICROgram(s)/formoterol 4.5 MICROgram(s) Inhaler 2 Puff(s) Inhalation two times a day    Hematalogic:  aspirin enteric coated 81 milliGRAM(s) Oral daily  heparin   Injectable 5000 Unit(s) SubCutaneous every 8 hours    Other:  acetaminophen   Tablet .. 1000 milliGRAM(s) Oral every 8 hours PRN  artificial  tears Solution 1 Drop(s) Right EYE every 6 hours PRN  buPROPion XL (24-Hour) . 150 milliGRAM(s) Oral daily  busPIRone 10 milliGRAM(s) Oral two times a day  calcitriol   Capsule 0.25 MICROGram(s) Oral daily  chlorhexidine 2% Cloths 1 Application(s) Topical <User Schedule>  cholecalciferol 1000 Unit(s) Oral daily  diVALproex  milliGRAM(s) Oral <User Schedule>  ferrous    sulfate 325 milliGRAM(s) Oral daily  finasteride 5 milliGRAM(s) Oral daily  folic acid 1 milliGRAM(s) Oral daily  levothyroxine 125 MICROGram(s) Oral daily  lidocaine   4% Patch 1 Patch Transdermal daily  nicotine -  14 mG/24Hr(s) Patch 1 patch Transdermal daily  pantoprazole    Tablet 40 milliGRAM(s) Oral before breakfast  polyethylene glycol 3350 17 Gram(s) Oral daily  risperiDONE   Tablet 2 milliGRAM(s) Oral <User Schedule>  senna 2 Tablet(s) Oral at bedtime  sevelamer carbonate 800 milliGRAM(s) Oral three times a day with meals  simethicone 80 milliGRAM(s) Chew three times a day  simvastatin 20 milliGRAM(s) Oral at bedtime    acetaminophen   Tablet .. 1000 milliGRAM(s) Oral every 8 hours PRN  ALBUTerol    90 MICROgram(s) HFA Inhaler 2 Puff(s) Inhalation <User Schedule>  artificial  tears Solution 1 Drop(s) Right EYE every 6 hours PRN  aspirin enteric coated 81 milliGRAM(s) Oral daily  budesonide 160 MICROgram(s)/formoterol 4.5 MICROgram(s) Inhaler 2 Puff(s) Inhalation two times a day  buPROPion XL (24-Hour) . 150 milliGRAM(s) Oral daily  busPIRone 10 milliGRAM(s) Oral two times a day  calcitriol   Capsule 0.25 MICROGram(s) Oral daily  chlorhexidine 2% Cloths 1 Application(s) Topical <User Schedule>  cholecalciferol 1000 Unit(s) Oral daily  diVALproex  milliGRAM(s) Oral <User Schedule>  ferrous    sulfate 325 milliGRAM(s) Oral daily  finasteride 5 milliGRAM(s) Oral daily  folic acid 1 milliGRAM(s) Oral daily  furosemide    Tablet 40 milliGRAM(s) Oral daily  heparin   Injectable 5000 Unit(s) SubCutaneous every 8 hours  levothyroxine 125 MICROGram(s) Oral daily  lidocaine   4% Patch 1 Patch Transdermal daily  nicotine -  14 mG/24Hr(s) Patch 1 patch Transdermal daily  pantoprazole    Tablet 40 milliGRAM(s) Oral before breakfast  polyethylene glycol 3350 17 Gram(s) Oral daily  risperiDONE   Tablet 2 milliGRAM(s) Oral <User Schedule>  senna 2 Tablet(s) Oral at bedtime  sevelamer carbonate 800 milliGRAM(s) Oral three times a day with meals  simethicone 80 milliGRAM(s) Chew three times a day  simvastatin 20 milliGRAM(s) Oral at bedtime  tamsulosin 0.8 milliGRAM(s) Oral at bedtime    Drug Dosing Weight  Height (cm): 172.7 (30 Aug 2021 08:52)  Weight (kg): 135.1 (31 Aug 2021 22:45)  BMI (kg/m2): 45.3 (31 Aug 2021 22:45)  BSA (m2): 2.42 (31 Aug 2021 22:45)    CENTRAL LINE: [ ] YES [x ] NO  LOCATION:   DATE INSERTED:  REMOVE: [ ] YES [ ] NO  EXPLAIN:    HATFIELD: [ ] YES [x ] NO    DATE INSERTED:  REMOVE:  [ ] YES [ ] NO  EXPLAIN:    PAST MEDICAL & SURGICAL HISTORY:  Hypothyroid    Hyperlipidemia    Ambulatory dysfunction    Anemia    History of BPH    CKD (chronic kidney disease)    Chronic obstructive pulmonary disease (COPD)    Bipolar disorder                09-07 @ 07:01  -  09-08 @ 07:00  --------------------------------------------------------  IN: 0 mL / OUT: 1375 mL / NET: -1375 mL            PHYSICAL EXAM:    GENERAL: NAD, morbidly obese  HEAD:  Atraumatic, Normocephalic  EYES: EOMI, PERRLA, conjunctiva and sclera clear  ENMT: No tonsillar erythema, exudates  NECK: Supple, No JVD  NERVOUS SYSTEM:  Alert & Oriented X3, Follows commands, moving all extremities  CHEST/LUNG: Clear to percussion bilaterally; No rales, rhonchi, wheezing, or rubs  HEART: Regular rate and rhythm; No murmurs, rubs, or gallops  ABDOMEN: Soft, Nontender, Nondistended; Bowel sounds present  EXTREMITIES:  2+ Peripheral Pulses, No clubbing, cyanosis, or edema  LYMPH: No lymphadenopathy noted  SKIN: No rashes or lesions      LABS:  CBC Full  -  ( 08 Sep 2021 07:57 )  WBC Count : 7.66 K/uL  RBC Count : 3.36 M/uL  Hemoglobin : 10.7 g/dL  Hematocrit : 33.7 %  Platelet Count - Automated : 207 K/uL  Mean Cell Volume : 100.3 fl  Mean Cell Hemoglobin : 31.8 pg  Mean Cell Hemoglobin Concentration : 31.8 gm/dL  Auto Neutrophil # : x  Auto Lymphocyte # : x  Auto Monocyte # : x  Auto Eosinophil # : x  Auto Basophil # : x  Auto Neutrophil % : x  Auto Lymphocyte % : x  Auto Monocyte % : x  Auto Eosinophil % : x  Auto Basophil % : x    09-08    141  |  106  |  75<H>  ----------------------------<  96  4.2   |  31  |  3.85<H>    Ca    9.6      08 Sep 2021 07:57  Phos  4.5     09-07  Mg     2.5     09-07    TPro  6.9  /  Alb  2.5<L>  /  TBili  0.3  /  DBili  x   /  AST  x   /  ALT  x   /  AlkPhos  82  09-08              [  ]  DVT Prophylaxis  [  ]  Nutrition, Brand, Rate         Goal Rate        Abnormal Nutritional Status -  Malnutrition   Cachexia      Morbid Obesity BMI >/=40    RADIOLOGY & ADDITIONAL STUDIES:  ***    Goals of Care Discussion with Family/Proxy/Other   - see note from/family meeting set up for...     GAYLA PEARCE    SCU progress note    INTERVAL HPI/OVERNIGHT EVENTS: ***Used BIPAP during the night    DNR [ ]   DNI  [  ]   FULL CODE    Covid - 19 PCR: Negative 9/6    The 4Ms    What Matters Most: see GOC  Age appropriate Medications/Screen for High Risk Medication: Yes  Mentation: see CAM below  Mobility: defer to physical exam    The Confusion Assessment Method (CAM) Diagnostic Algorithm     1: Acute Onset or Fluctuating Course  - Is there evidence of an acute change in mental status from the patient’s baseline? Did the (abnormal) behavior  fluctuate during the day, that is, tend to come and go, or increase and decrease in severity?  [ ] YES [x ] NO     2: Inattention  - Did the patient have difficulty focusing attention, being easily distractible, or having difficulty keeping track of what was being said?  [ ] YES [ x] NO     3: Disorganized thinking  -Was the patient’s thinking disorganized or incoherent, such as rambling or irrelevant conversation, unclear or illogical flow of ideas, or unpredictable switching from subject to subject?  [ ] YES [x ] NO    4: Altered Level of consciousness?  [ ] YES [x ] NO    The diagnosis of delirium by CAM requires the presence of features 1 and 2 and either 3 or 4.    PRESSORS: [ ] YES [x ] NO    Cardiovascular:  Heart Failure  Acute   Acute on Chronic  Chronic       furosemide    Tablet 40 milliGRAM(s) Oral daily  tamsulosin 0.8 milliGRAM(s) Oral at bedtime    Pulmonary:  ALBUTerol    90 MICROgram(s) HFA Inhaler 2 Puff(s) Inhalation <User Schedule>  budesonide 160 MICROgram(s)/formoterol 4.5 MICROgram(s) Inhaler 2 Puff(s) Inhalation two times a day    Hematalogic:  aspirin enteric coated 81 milliGRAM(s) Oral daily  heparin   Injectable 5000 Unit(s) SubCutaneous every 8 hours    Other:  acetaminophen   Tablet .. 1000 milliGRAM(s) Oral every 8 hours PRN  artificial  tears Solution 1 Drop(s) Right EYE every 6 hours PRN  buPROPion XL (24-Hour) . 150 milliGRAM(s) Oral daily  busPIRone 10 milliGRAM(s) Oral two times a day  calcitriol   Capsule 0.25 MICROGram(s) Oral daily  chlorhexidine 2% Cloths 1 Application(s) Topical <User Schedule>  cholecalciferol 1000 Unit(s) Oral daily  diVALproex  milliGRAM(s) Oral <User Schedule>  ferrous    sulfate 325 milliGRAM(s) Oral daily  finasteride 5 milliGRAM(s) Oral daily  folic acid 1 milliGRAM(s) Oral daily  levothyroxine 125 MICROGram(s) Oral daily  lidocaine   4% Patch 1 Patch Transdermal daily  nicotine -  14 mG/24Hr(s) Patch 1 patch Transdermal daily  pantoprazole    Tablet 40 milliGRAM(s) Oral before breakfast  polyethylene glycol 3350 17 Gram(s) Oral daily  risperiDONE   Tablet 2 milliGRAM(s) Oral <User Schedule>  senna 2 Tablet(s) Oral at bedtime  sevelamer carbonate 800 milliGRAM(s) Oral three times a day with meals  simethicone 80 milliGRAM(s) Chew three times a day  simvastatin 20 milliGRAM(s) Oral at bedtime    acetaminophen   Tablet .. 1000 milliGRAM(s) Oral every 8 hours PRN  ALBUTerol    90 MICROgram(s) HFA Inhaler 2 Puff(s) Inhalation <User Schedule>  artificial  tears Solution 1 Drop(s) Right EYE every 6 hours PRN  aspirin enteric coated 81 milliGRAM(s) Oral daily  budesonide 160 MICROgram(s)/formoterol 4.5 MICROgram(s) Inhaler 2 Puff(s) Inhalation two times a day  buPROPion XL (24-Hour) . 150 milliGRAM(s) Oral daily  busPIRone 10 milliGRAM(s) Oral two times a day  calcitriol   Capsule 0.25 MICROGram(s) Oral daily  chlorhexidine 2% Cloths 1 Application(s) Topical <User Schedule>  cholecalciferol 1000 Unit(s) Oral daily  diVALproex  milliGRAM(s) Oral <User Schedule>  ferrous    sulfate 325 milliGRAM(s) Oral daily  finasteride 5 milliGRAM(s) Oral daily  folic acid 1 milliGRAM(s) Oral daily  furosemide    Tablet 40 milliGRAM(s) Oral daily  heparin   Injectable 5000 Unit(s) SubCutaneous every 8 hours  levothyroxine 125 MICROGram(s) Oral daily  lidocaine   4% Patch 1 Patch Transdermal daily  nicotine -  14 mG/24Hr(s) Patch 1 patch Transdermal daily  pantoprazole    Tablet 40 milliGRAM(s) Oral before breakfast  polyethylene glycol 3350 17 Gram(s) Oral daily  risperiDONE   Tablet 2 milliGRAM(s) Oral <User Schedule>  senna 2 Tablet(s) Oral at bedtime  sevelamer carbonate 800 milliGRAM(s) Oral three times a day with meals  simethicone 80 milliGRAM(s) Chew three times a day  simvastatin 20 milliGRAM(s) Oral at bedtime  tamsulosin 0.8 milliGRAM(s) Oral at bedtime    Drug Dosing Weight  Height (cm): 172.7 (30 Aug 2021 08:52)  Weight (kg): 135.1 (31 Aug 2021 22:45)  BMI (kg/m2): 45.3 (31 Aug 2021 22:45)  BSA (m2): 2.42 (31 Aug 2021 22:45)    CENTRAL LINE: [ ] YES [x ] NO  LOCATION:   DATE INSERTED:  REMOVE: [ ] YES [ ] NO  EXPLAIN:    HATFIELD: [ ] YES [x ] NO    DATE INSERTED:  REMOVE:  [ ] YES [ ] NO  EXPLAIN:    PAST MEDICAL & SURGICAL HISTORY:  Hypothyroid    Hyperlipidemia    Ambulatory dysfunction    Anemia    History of BPH    CKD (chronic kidney disease)    Chronic obstructive pulmonary disease (COPD)    Bipolar disorder                09-07 @ 07:01  -  09-08 @ 07:00  --------------------------------------------------------  IN: 0 mL / OUT: 1375 mL / NET: -1375 mL            PHYSICAL EXAM:    GENERAL: NAD, morbidly obese  HEAD:  Atraumatic, Normocephalic  EYES: EOMI, PERRLA, conjunctiva and sclera clear  ENMT: No tonsillar erythema, exudates  NECK: Supple, No JVD  NERVOUS SYSTEM:  Alert & Oriented X3, Follows commands, moving all extremities  CHEST/LUNG: Clear to percussion bilaterally; No rales, rhonchi, wheezing, or rubs  HEART: Regular rate and rhythm; No murmurs, rubs, or gallops  ABDOMEN: Soft, Nontender, Nondistended; Bowel sounds present  EXTREMITIES:  +2 edema bilateral lower extremities.  Chronic venous stasis. 2+ Peripheral Pulses, No clubbing   LYMPH: No lymphadenopathy noted  SKIN: Blanchable erythema bilateral gluteus.. Chronic venous stasis bilateral lower extremities      LABS:  CBC Full  -  ( 08 Sep 2021 07:57 )  WBC Count : 7.66 K/uL  RBC Count : 3.36 M/uL  Hemoglobin : 10.7 g/dL  Hematocrit : 33.7 %  Platelet Count - Automated : 207 K/uL  Mean Cell Volume : 100.3 fl  Mean Cell Hemoglobin : 31.8 pg  Mean Cell Hemoglobin Concentration : 31.8 gm/dL  Auto Neutrophil # : x  Auto Lymphocyte # : x  Auto Monocyte # : x  Auto Eosinophil # : x  Auto Basophil # : x  Auto Neutrophil % : x  Auto Lymphocyte % : x  Auto Monocyte % : x  Auto Eosinophil % : x  Auto Basophil % : x    09-08    141  |  106  |  75<H>  ----------------------------<  96  4.2   |  31  |  3.85<H>    Ca    9.6      08 Sep 2021 07:57  Phos  4.5     09-07  Mg     2.5     09-07    TPro  6.9  /  Alb  2.5<L>  /  TBili  0.3  /  DBili  x   /  AST  x   /  ALT  x   /  AlkPhos  82  09-08              [  ]  DVT Prophylaxis  [  ]  Nutrition, Brand, Rate         Goal Rate        Abnormal Nutritional Status -  Malnutrition   Cachexia      Morbid Obesity BMI >/=40    RADIOLOGY & ADDITIONAL STUDIES:  ***  < from: Xray Chest 1 View- PORTABLE-Routine (Xray Chest 1 View- PORTABLE-Routine in AM.) (09.01.21 @ 07:00) >  Frontal expiratory view of the chest shows the heart to be similar in size. The lungs show mild bilateral atelectasis. Pulmonary vascularity is within normal limits and there is no evidence of pneumothorax nor pleural effusion. Elevated left hemidiaphragm is again noted.    IMPRESSION:  Atelectasis. No evidence of pulmonary edema.    < end of copied text >    Goals of Care Discussion with Family/Proxy/Other   - see note from      GAYLA PEARCE    SCU progress note    INTERVAL HPI/OVERNIGHT EVENTS: ***Used BIPAP during the night    DNR [ ]   DNI  [  ]   FULL CODE    Covid - 19 PCR: Negative 9/6    The 4Ms    What Matters Most: see GOC  Age appropriate Medications/Screen for High Risk Medication: Yes  Mentation: see CAM below  Mobility: defer to physical exam    The Confusion Assessment Method (CAM) Diagnostic Algorithm     1: Acute Onset or Fluctuating Course  - Is there evidence of an acute change in mental status from the patient’s baseline? Did the (abnormal) behavior  fluctuate during the day, that is, tend to come and go, or increase and decrease in severity?  [ ] YES [x ] NO     2: Inattention  - Did the patient have difficulty focusing attention, being easily distractible, or having difficulty keeping track of what was being said?  [ ] YES [ x] NO     3: Disorganized thinking  -Was the patient’s thinking disorganized or incoherent, such as rambling or irrelevant conversation, unclear or illogical flow of ideas, or unpredictable switching from subject to subject?  [ ] YES [x ] NO    4: Altered Level of consciousness?  [ ] YES [x ] NO    The diagnosis of delirium by CAM requires the presence of features 1 and 2 and either 3 or 4.    PRESSORS: [ ] YES [x ] NO    Cardiovascular:  Heart Failure  Acute   Acute on Chronic  Chronic       furosemide    Tablet 40 milliGRAM(s) Oral daily  tamsulosin 0.8 milliGRAM(s) Oral at bedtime    Pulmonary:  ALBUTerol    90 MICROgram(s) HFA Inhaler 2 Puff(s) Inhalation <User Schedule>  budesonide 160 MICROgram(s)/formoterol 4.5 MICROgram(s) Inhaler 2 Puff(s) Inhalation two times a day    Hematalogic:  aspirin enteric coated 81 milliGRAM(s) Oral daily  heparin   Injectable 5000 Unit(s) SubCutaneous every 8 hours    Other:  acetaminophen   Tablet .. 1000 milliGRAM(s) Oral every 8 hours PRN  artificial  tears Solution 1 Drop(s) Right EYE every 6 hours PRN  buPROPion XL (24-Hour) . 150 milliGRAM(s) Oral daily  busPIRone 10 milliGRAM(s) Oral two times a day  calcitriol   Capsule 0.25 MICROGram(s) Oral daily  chlorhexidine 2% Cloths 1 Application(s) Topical <User Schedule>  cholecalciferol 1000 Unit(s) Oral daily  diVALproex  milliGRAM(s) Oral <User Schedule>  ferrous    sulfate 325 milliGRAM(s) Oral daily  finasteride 5 milliGRAM(s) Oral daily  folic acid 1 milliGRAM(s) Oral daily  levothyroxine 125 MICROGram(s) Oral daily  lidocaine   4% Patch 1 Patch Transdermal daily  nicotine -  14 mG/24Hr(s) Patch 1 patch Transdermal daily  pantoprazole    Tablet 40 milliGRAM(s) Oral before breakfast  polyethylene glycol 3350 17 Gram(s) Oral daily  risperiDONE   Tablet 2 milliGRAM(s) Oral <User Schedule>  senna 2 Tablet(s) Oral at bedtime  sevelamer carbonate 800 milliGRAM(s) Oral three times a day with meals  simethicone 80 milliGRAM(s) Chew three times a day  simvastatin 20 milliGRAM(s) Oral at bedtime    acetaminophen   Tablet .. 1000 milliGRAM(s) Oral every 8 hours PRN  ALBUTerol    90 MICROgram(s) HFA Inhaler 2 Puff(s) Inhalation <User Schedule>  artificial  tears Solution 1 Drop(s) Right EYE every 6 hours PRN  aspirin enteric coated 81 milliGRAM(s) Oral daily  budesonide 160 MICROgram(s)/formoterol 4.5 MICROgram(s) Inhaler 2 Puff(s) Inhalation two times a day  buPROPion XL (24-Hour) . 150 milliGRAM(s) Oral daily  busPIRone 10 milliGRAM(s) Oral two times a day  calcitriol   Capsule 0.25 MICROGram(s) Oral daily  chlorhexidine 2% Cloths 1 Application(s) Topical <User Schedule>  cholecalciferol 1000 Unit(s) Oral daily  diVALproex  milliGRAM(s) Oral <User Schedule>  ferrous    sulfate 325 milliGRAM(s) Oral daily  finasteride 5 milliGRAM(s) Oral daily  folic acid 1 milliGRAM(s) Oral daily  furosemide    Tablet 40 milliGRAM(s) Oral daily  heparin   Injectable 5000 Unit(s) SubCutaneous every 8 hours  levothyroxine 125 MICROGram(s) Oral daily  lidocaine   4% Patch 1 Patch Transdermal daily  nicotine -  14 mG/24Hr(s) Patch 1 patch Transdermal daily  pantoprazole    Tablet 40 milliGRAM(s) Oral before breakfast  polyethylene glycol 3350 17 Gram(s) Oral daily  risperiDONE   Tablet 2 milliGRAM(s) Oral <User Schedule>  senna 2 Tablet(s) Oral at bedtime  sevelamer carbonate 800 milliGRAM(s) Oral three times a day with meals  simethicone 80 milliGRAM(s) Chew three times a day  simvastatin 20 milliGRAM(s) Oral at bedtime  tamsulosin 0.8 milliGRAM(s) Oral at bedtime    Drug Dosing Weight  Height (cm): 172.7 (30 Aug 2021 08:52)  Weight (kg): 135.1 (31 Aug 2021 22:45)  BMI (kg/m2): 45.3 (31 Aug 2021 22:45)  BSA (m2): 2.42 (31 Aug 2021 22:45)    CENTRAL LINE: [ ] YES [x ] NO  LOCATION:   DATE INSERTED:  REMOVE: [ ] YES [ ] NO  EXPLAIN:    HATFIELD: [ ] YES [x ] NO    DATE INSERTED:  REMOVE:  [ ] YES [ ] NO  EXPLAIN:    PAST MEDICAL & SURGICAL HISTORY:  Hypothyroid    Hyperlipidemia    Ambulatory dysfunction    Anemia    History of BPH    CKD (chronic kidney disease)    Chronic obstructive pulmonary disease (COPD)    Bipolar disorder                09-07 @ 07:01  -  09-08 @ 07:00  --------------------------------------------------------  IN: 0 mL / OUT: 1375 mL / NET: -1375 mL            PHYSICAL EXAM:    GENERAL: NAD, morbidly obese  HEAD:  Atraumatic, Normocephalic  EYES: EOMI, PERRLA, conjunctiva and sclera clear  ENMT: No tonsillar erythema, exudates  NECK: Supple, No JVD  NERVOUS SYSTEM:  Alert & Oriented X3, Follows commands, moving all extremities  CHEST/LUNG: Clear to percussion bilaterally; No rales, rhonchi, wheezing, or rubs  HEART: Regular rate and rhythm; No murmurs, rubs, or gallops  ABDOMEN: Soft, Nontender, Nondistended; Bowel sounds present  EXTREMITIES:  +2 edema bilateral lower extremities.  Chronic venous stasis. 2+ Peripheral Pulses, No clubbing   LYMPH: No lymphadenopathy noted  SKIN: Blanchable erythema bilateral gluteus.. Chronic venous stasis bilateral lower extremities      LABS:  CBC Full  -  ( 08 Sep 2021 07:57 )  WBC Count : 7.66 K/uL  RBC Count : 3.36 M/uL  Hemoglobin : 10.7 g/dL  Hematocrit : 33.7 %  Platelet Count - Automated : 207 K/uL  Mean Cell Volume : 100.3 fl  Mean Cell Hemoglobin : 31.8 pg  Mean Cell Hemoglobin Concentration : 31.8 gm/dL  Auto Neutrophil # : x  Auto Lymphocyte # : x  Auto Monocyte # : x  Auto Eosinophil # : x  Auto Basophil # : x  Auto Neutrophil % : x  Auto Lymphocyte % : x  Auto Monocyte % : x  Auto Eosinophil % : x  Auto Basophil % : x    09-08    141  |  106  |  75<H>  ----------------------------<  96  4.2   |  31  |  3.85<H>    Ca    9.6      08 Sep 2021 07:57  Phos  4.5     09-07  Mg     2.5     09-07    TPro  6.9  /  Alb  2.5<L>  /  TBili  0.3  /  DBili  x   /  AST  x   /  ALT  x   /  AlkPhos  82  09-08              [  ]  DVT Prophylaxis  [  ]  Nutrition, Brand, Rate         Goal Rate        Abnormal Nutritional Status -  Malnutrition   Cachexia      Morbid Obesity BMI >/=40    RADIOLOGY & ADDITIONAL STUDIES:  ***  < from: Xray Chest 1 View- PORTABLE-Routine (Xray Chest 1 View- PORTABLE-Routine in AM.) (09.01.21 @ 07:00) >  Frontal expiratory view of the chest shows the heart to be similar in size. The lungs show mild bilateral atelectasis. Pulmonary vascularity is within normal limits and there is no evidence of pneumothorax nor pleural effusion. Elevated left hemidiaphragm is again noted.    IMPRESSION:  Atelectasis. No evidence of pulmonary edema.    < end of copied text >    Goals of Care Discussion with Family/Proxy/Other

## 2021-09-08 NOTE — PROGRESS NOTE ADULT - ATTENDING COMMENTS
Problem/Plan - 1:  ·  Problem: Acute respiratory failure with hypercapnia.   ·  Plan: Secondary to COPD and likely TEJ/OHS  Continue BIPAP nightly.   Currently maintaining oxygen saturation off oxygen  Continue to monitor oxygen saturation  Continue bronchodilators and ICS.     Problem/Plan - 2:  ·  Problem: COPD (chronic obstructive pulmonary disease).   ·  Plan: Continue bronchodilators and ICS  BIPAP nightly and as needed during the day.     Problem/Plan - 3:  ·  Problem: Acute kidney injury superimposed on CKD.   ·  Plan: CKD stage 4  Baseline creatinine 3.1  Daily CMP while in the hospital  Renal following.  Avoid nephrotoxins.  Continue sevelamer.

## 2021-09-08 NOTE — PROGRESS NOTE ADULT - PROBLEM SELECTOR PLAN 8
Pt takes synthroid at home  Continue home regimen
- Patient with hx of hypothyroidism on Synthroid at home   - C/w home meds  - F/u TSH and adjust medications if needed
- Patient with hx of hypothyroidism on Synthroid at home   - C/w home meds  - F/u TSH and adjust medications if needed
Continue flomax
Pt takes synthroid at home  Continue home regimen
Pt takes synthroid at home  Continue home regimen

## 2021-09-08 NOTE — PROGRESS NOTE ADULT - PROBLEM SELECTOR PROBLEM 5
Lymphedema
Bipolar disorder
COPD (chronic obstructive pulmonary disease)
Lymphedema
COPD (chronic obstructive pulmonary disease)
Bipolar disorder

## 2021-09-08 NOTE — PROGRESS NOTE ADULT - PROBLEM SELECTOR PROBLEM 8
Hypothyroidism
BPH (benign prostatic hyperplasia)
Hypothyroidism

## 2021-09-08 NOTE — PROGRESS NOTE ADULT - PROBLEM SELECTOR PLAN 9
- Patient has hx of BPH on Tamsulosin, Finasteride at home   - C/w home meds
Pt takes proscar and flomax at home  Continue home regimen
Pt from [facility]  PT rec TG to be discharged to [facility] today. Auth received.
Pt takes proscar and flomax at home  Increase Flomax to 0.8 secondary to failed TOV
Pt takes proscar and flomax at home  Continue home regimen
Pt takes proscar and flomax at home  Increase Flomax to 0.8 secondary to failed TOV
Pt takes proscar and flomax at home  Increase Flomax to 0.8 secondary to failed TOV
Pt takes proscar and flomax at home  C/w Flomax
- Patient has hx of BPH on Tamsulosin, Finasteride at home   - C/w home meds

## 2021-09-08 NOTE — PROGRESS NOTE ADULT - PROBLEM SELECTOR PROBLEM 9
BPH (benign prostatic hyperplasia)
Discharge planning issues
BPH (benign prostatic hyperplasia)

## 2021-09-08 NOTE — PROGRESS NOTE ADULT - PROBLEM SELECTOR PLAN 7
Pt takes iron at home  Continue home regimen
Pt takes iron at home  Continue home regimen
- Patient with hx of anemia of chronic disease on Ferrous sulfate at home  - C/w home meds  - Monitor CBC daily
Pt takes iron at home  Continue home regimen
Continue synthroid.  Will need TSH monitored as an outpatient.
Pt takes iron at home  Continue home regimen
Pt takes iron at home  Continue home regimen
- Patient with hx of anemia of chronic disease on Ferrous sulfate at home  - C/w home meds  - Monitor CBC daily
Pt takes iron at home  Continue home regimen

## 2021-09-08 NOTE — PROGRESS NOTE ADULT - SUBJECTIVE AND OBJECTIVE BOX
C A R D I O L O G Y  *********************    DATE OF SERVICE: 09-08-21    Patient denies chest pain or shortness of breath.   Review of symptoms otherwise negative.    acetaminophen   Tablet .. 1000 milliGRAM(s) Oral every 8 hours PRN  ALBUTerol    90 MICROgram(s) HFA Inhaler 2 Puff(s) Inhalation <User Schedule>  artificial  tears Solution 1 Drop(s) Right EYE every 6 hours PRN  aspirin enteric coated 81 milliGRAM(s) Oral daily  budesonide 160 MICROgram(s)/formoterol 4.5 MICROgram(s) Inhaler 2 Puff(s) Inhalation two times a day  buPROPion XL (24-Hour) . 150 milliGRAM(s) Oral daily  busPIRone 10 milliGRAM(s) Oral two times a day  calcitriol   Capsule 0.25 MICROGram(s) Oral daily  chlorhexidine 2% Cloths 1 Application(s) Topical <User Schedule>  cholecalciferol 1000 Unit(s) Oral daily  diVALproex  milliGRAM(s) Oral <User Schedule>  ferrous    sulfate 325 milliGRAM(s) Oral daily  finasteride 5 milliGRAM(s) Oral daily  folic acid 1 milliGRAM(s) Oral daily  furosemide    Tablet 40 milliGRAM(s) Oral daily  heparin   Injectable 5000 Unit(s) SubCutaneous every 8 hours  levothyroxine 125 MICROGram(s) Oral daily  lidocaine   4% Patch 1 Patch Transdermal daily  nicotine -  14 mG/24Hr(s) Patch 1 patch Transdermal daily  pantoprazole    Tablet 40 milliGRAM(s) Oral before breakfast  polyethylene glycol 3350 17 Gram(s) Oral daily  risperiDONE   Tablet 2 milliGRAM(s) Oral <User Schedule>  senna 2 Tablet(s) Oral at bedtime  sevelamer carbonate 800 milliGRAM(s) Oral three times a day with meals  simethicone 80 milliGRAM(s) Chew three times a day  simvastatin 20 milliGRAM(s) Oral at bedtime  tamsulosin 0.8 milliGRAM(s) Oral at bedtime                            10.7   7.66  )-----------( 207      ( 08 Sep 2021 07:57 )             33.7       Hemoglobin: 10.7 g/dL (09-08 @ 07:57)  Hemoglobin: 10.9 g/dL (09-07 @ 08:30)  Hemoglobin: 10.5 g/dL (09-06 @ 03:51)  Hemoglobin: 10.5 g/dL (09-05 @ 10:06)  Hemoglobin: 10.6 g/dL (09-04 @ 08:41)      09-08    141  |  106  |  75<H>  ----------------------------<  96  4.2   |  31  |  3.85<H>    Ca    9.6      08 Sep 2021 07:57  Phos  4.5     09-07  Mg     2.5     09-07    TPro  6.9  /  Alb  2.5<L>  /  TBili  0.3  /  DBili  x   /  AST  10  /  ALT  12  /  AlkPhos  82  09-08    Creatinine Trend: 3.85<--, 3.67<--, 3.66<--, 3.54<--, 3.38<--, 3.65<--    COAGS:           T(C): 36.4 (09-08-21 @ 05:04), Max: 37 (09-07-21 @ 20:39)  HR: 71 (09-08-21 @ 05:04) (71 - 82)  BP: 120/81 (09-08-21 @ 05:04) (112/64 - 139/70)  RR: 18 (09-08-21 @ 05:04) (15 - 18)  SpO2: 98% (09-08-21 @ 05:04) (91% - 98%)  Wt(kg): --    I&O's Summary    07 Sep 2021 07:01  -  08 Sep 2021 07:00  --------------------------------------------------------  IN: 0 mL / OUT: 1375 mL / NET: -1375 mL    08 Sep 2021 07:01  -  08 Sep 2021 10:28  --------------------------------------------------------  IN: 0 mL / OUT: 560 mL / NET: -560 mL          Gen: pleasant overweight white male in no acute distress, cooperative.  HEENT:  (-)icterus (-)pallor  CV: N S1 S2 1/6 ABIGAIL (+)2 Pulses B/l  Resp:  Clear to ausculatation B/L, normal effort  GI: (+) BS Soft, NT, ND  Lymph:  severe  edema, (-)obvious lymphadenopathy  Skin: Warm to touch, Normal turgor  Psych: Appropriate mood and affect      ASSESSMENT/PLAN: 	61y Male morbid obesity from [facility] [place] ambulates w/ walker has past medical hx of COPD not on O2 at home, hypothyroidism, anemia, left DVT, CKD, BPH, HLD, bipolar disorder, ambulatory dysfunction, lymphedema was BIBEMS as patient was complaining of worsening lower extremity weakness and  lightheadedness    - Recovering well after Narcan for opiate toxicity.  - Echo is overall reassuring.  - Work toward discharge back to assisted living.  No opposition to discharge/xfer when available.  - No angina or CHF  - Clinical presentation is sequale of a primary pulmonary process.  Trace trop and elevated BNP not specific in the setting of CKD  - He has no angina at this point.  Can consider Stress as an oupt when he is fully recovered    Austen Javier MD, [facility]  BEEPER [phone]

## 2021-09-08 NOTE — ADVANCED PRACTICE NURSE CONSULT - ASSESSMENT
This is a 61yr old male patient admitted for Weakness, presenting with blanchable erythema to the Bilateral Gluteus

## 2022-01-01 ENCOUNTER — TRANSCRIPTION ENCOUNTER (OUTPATIENT)
Age: 62
End: 2022-01-01

## 2022-01-01 ENCOUNTER — INPATIENT (INPATIENT)
Facility: HOSPITAL | Age: 62
LOS: 14 days | Discharge: NURSING, REHAB OR LONG-TERM CARE | DRG: 871 | End: 2022-12-15
Attending: SURGERY | Admitting: SURGERY
Payer: MEDICAID

## 2022-01-01 ENCOUNTER — INPATIENT (INPATIENT)
Facility: HOSPITAL | Age: 62
LOS: 5 days | Discharge: NURSING, REHAB OR LONG-TERM CARE | DRG: 871 | End: 2022-07-23
Attending: INTERNAL MEDICINE | Admitting: INTERNAL MEDICINE
Payer: MEDICAID

## 2022-01-01 VITALS
SYSTOLIC BLOOD PRESSURE: 89 MMHG | WEIGHT: 251.11 LBS | RESPIRATION RATE: 22 BRPM | HEART RATE: 55 BPM | DIASTOLIC BLOOD PRESSURE: 42 MMHG | OXYGEN SATURATION: 96 %

## 2022-01-01 VITALS
OXYGEN SATURATION: 96 % | HEART RATE: 102 BPM | SYSTOLIC BLOOD PRESSURE: 137 MMHG | RESPIRATION RATE: 19 BRPM | TEMPERATURE: 99 F | DIASTOLIC BLOOD PRESSURE: 56 MMHG

## 2022-01-01 VITALS
HEIGHT: 68 IN | SYSTOLIC BLOOD PRESSURE: 92 MMHG | DIASTOLIC BLOOD PRESSURE: 66 MMHG | HEART RATE: 108 BPM | WEIGHT: 315 LBS | TEMPERATURE: 98 F | OXYGEN SATURATION: 95 % | RESPIRATION RATE: 18 BRPM

## 2022-01-01 VITALS
HEART RATE: 77 BPM | RESPIRATION RATE: 25 BRPM | SYSTOLIC BLOOD PRESSURE: 103 MMHG | DIASTOLIC BLOOD PRESSURE: 50 MMHG | OXYGEN SATURATION: 98 %

## 2022-01-01 DIAGNOSIS — I50.32 CHRONIC DIASTOLIC (CONGESTIVE) HEART FAILURE: ICD-10-CM

## 2022-01-01 DIAGNOSIS — E03.9 HYPOTHYROIDISM, UNSPECIFIED: ICD-10-CM

## 2022-01-01 DIAGNOSIS — N19 UNSPECIFIED KIDNEY FAILURE: ICD-10-CM

## 2022-01-01 DIAGNOSIS — N18.9 CHRONIC KIDNEY DISEASE, UNSPECIFIED: ICD-10-CM

## 2022-01-01 DIAGNOSIS — Z87.438 PERSONAL HISTORY OF OTHER DISEASES OF MALE GENITAL ORGANS: ICD-10-CM

## 2022-01-01 DIAGNOSIS — E66.9 OBESITY, UNSPECIFIED: ICD-10-CM

## 2022-01-01 DIAGNOSIS — E78.5 HYPERLIPIDEMIA, UNSPECIFIED: ICD-10-CM

## 2022-01-01 DIAGNOSIS — N17.9 ACUTE KIDNEY FAILURE, UNSPECIFIED: ICD-10-CM

## 2022-01-01 DIAGNOSIS — J96.01 ACUTE RESPIRATORY FAILURE WITH HYPOXIA: ICD-10-CM

## 2022-01-01 DIAGNOSIS — T14.90XA INJURY, UNSPECIFIED, INITIAL ENCOUNTER: ICD-10-CM

## 2022-01-01 DIAGNOSIS — J44.9 CHRONIC OBSTRUCTIVE PULMONARY DISEASE, UNSPECIFIED: ICD-10-CM

## 2022-01-01 DIAGNOSIS — D64.9 ANEMIA, UNSPECIFIED: ICD-10-CM

## 2022-01-01 DIAGNOSIS — F31.9 BIPOLAR DISORDER, UNSPECIFIED: ICD-10-CM

## 2022-01-01 DIAGNOSIS — N40.0 BENIGN PROSTATIC HYPERPLASIA WITHOUT LOWER URINARY TRACT SYMPTOMS: ICD-10-CM

## 2022-01-01 DIAGNOSIS — M79.606 PAIN IN LEG, UNSPECIFIED: ICD-10-CM

## 2022-01-01 DIAGNOSIS — M54.9 DORSALGIA, UNSPECIFIED: ICD-10-CM

## 2022-01-01 DIAGNOSIS — I89.0 LYMPHEDEMA, NOT ELSEWHERE CLASSIFIED: ICD-10-CM

## 2022-01-01 DIAGNOSIS — J96.02 ACUTE RESPIRATORY FAILURE WITH HYPERCAPNIA: ICD-10-CM

## 2022-01-01 DIAGNOSIS — Z71.89 OTHER SPECIFIED COUNSELING: ICD-10-CM

## 2022-01-01 LAB
ALBUMIN SERPL ELPH-MCNC: 1.7 G/DL — LOW (ref 3.5–5)
ALBUMIN SERPL ELPH-MCNC: 1.7 G/DL — LOW (ref 3.5–5)
ALBUMIN SERPL ELPH-MCNC: 1.8 G/DL — LOW (ref 3.5–5)
ALBUMIN SERPL ELPH-MCNC: 1.9 G/DL — LOW (ref 3.5–5)
ALBUMIN SERPL ELPH-MCNC: 2 G/DL — LOW (ref 3.5–5)
ALBUMIN SERPL ELPH-MCNC: 2.1 G/DL — LOW (ref 3.5–5)
ALBUMIN SERPL ELPH-MCNC: 2.3 G/DL — LOW (ref 3.5–5)
ALBUMIN SERPL ELPH-MCNC: 2.4 G/DL — LOW (ref 3.5–5)
ALBUMIN SERPL ELPH-MCNC: 2.5 G/DL — LOW (ref 3.5–5)
ALP SERPL-CCNC: 103 U/L — SIGNIFICANT CHANGE UP (ref 40–120)
ALP SERPL-CCNC: 112 U/L — SIGNIFICANT CHANGE UP (ref 40–120)
ALP SERPL-CCNC: 63 U/L — SIGNIFICANT CHANGE UP (ref 40–120)
ALP SERPL-CCNC: 65 U/L — SIGNIFICANT CHANGE UP (ref 40–120)
ALP SERPL-CCNC: 66 U/L — SIGNIFICANT CHANGE UP (ref 40–120)
ALP SERPL-CCNC: 70 U/L — SIGNIFICANT CHANGE UP (ref 40–120)
ALP SERPL-CCNC: 71 U/L — SIGNIFICANT CHANGE UP (ref 40–120)
ALP SERPL-CCNC: 71 U/L — SIGNIFICANT CHANGE UP (ref 40–120)
ALP SERPL-CCNC: 73 U/L — SIGNIFICANT CHANGE UP (ref 40–120)
ALP SERPL-CCNC: 74 U/L — SIGNIFICANT CHANGE UP (ref 40–120)
ALP SERPL-CCNC: 74 U/L — SIGNIFICANT CHANGE UP (ref 40–120)
ALP SERPL-CCNC: 77 U/L — SIGNIFICANT CHANGE UP (ref 40–120)
ALP SERPL-CCNC: 78 U/L — SIGNIFICANT CHANGE UP (ref 40–120)
ALP SERPL-CCNC: 78 U/L — SIGNIFICANT CHANGE UP (ref 40–120)
ALP SERPL-CCNC: 79 U/L — SIGNIFICANT CHANGE UP (ref 40–120)
ALP SERPL-CCNC: 81 U/L — SIGNIFICANT CHANGE UP (ref 40–120)
ALP SERPL-CCNC: 83 U/L — SIGNIFICANT CHANGE UP (ref 40–120)
ALP SERPL-CCNC: 90 U/L — SIGNIFICANT CHANGE UP (ref 40–120)
ALP SERPL-CCNC: 93 U/L — SIGNIFICANT CHANGE UP (ref 40–120)
ALP SERPL-CCNC: 98 U/L — SIGNIFICANT CHANGE UP (ref 40–120)
ALT FLD-CCNC: 10 U/L DA — SIGNIFICANT CHANGE UP (ref 10–60)
ALT FLD-CCNC: 11 U/L DA — SIGNIFICANT CHANGE UP (ref 10–60)
ALT FLD-CCNC: 12 U/L DA — SIGNIFICANT CHANGE UP (ref 10–60)
ALT FLD-CCNC: 14 U/L DA — SIGNIFICANT CHANGE UP (ref 10–60)
ALT FLD-CCNC: 14 U/L DA — SIGNIFICANT CHANGE UP (ref 10–60)
ALT FLD-CCNC: 15 U/L DA — SIGNIFICANT CHANGE UP (ref 10–60)
ALT FLD-CCNC: 16 U/L DA — SIGNIFICANT CHANGE UP (ref 10–60)
ALT FLD-CCNC: 17 U/L DA — SIGNIFICANT CHANGE UP (ref 10–60)
ALT FLD-CCNC: 18 U/L DA — SIGNIFICANT CHANGE UP (ref 10–60)
ALT FLD-CCNC: 19 U/L DA — SIGNIFICANT CHANGE UP (ref 10–60)
ALT FLD-CCNC: 24 U/L DA — SIGNIFICANT CHANGE UP (ref 10–60)
ALT FLD-CCNC: 26 U/L DA — SIGNIFICANT CHANGE UP (ref 10–60)
ALT FLD-CCNC: 28 U/L DA — SIGNIFICANT CHANGE UP (ref 10–60)
ALT FLD-CCNC: 8 U/L DA — LOW (ref 10–60)
ALT FLD-CCNC: 8 U/L DA — LOW (ref 10–60)
ALT FLD-CCNC: 9 U/L DA — LOW (ref 10–60)
AMMONIA BLD-MCNC: 58 UMOL/L — HIGH (ref 11–32)
AMPHET UR-MCNC: NEGATIVE — SIGNIFICANT CHANGE UP
ANION GAP SERPL CALC-SCNC: 10 MMOL/L — SIGNIFICANT CHANGE UP (ref 5–17)
ANION GAP SERPL CALC-SCNC: 11 MMOL/L — SIGNIFICANT CHANGE UP (ref 5–17)
ANION GAP SERPL CALC-SCNC: 11 MMOL/L — SIGNIFICANT CHANGE UP (ref 5–17)
ANION GAP SERPL CALC-SCNC: 12 MMOL/L — SIGNIFICANT CHANGE UP (ref 5–17)
ANION GAP SERPL CALC-SCNC: 12 MMOL/L — SIGNIFICANT CHANGE UP (ref 5–17)
ANION GAP SERPL CALC-SCNC: 15 MMOL/L — SIGNIFICANT CHANGE UP (ref 5–17)
ANION GAP SERPL CALC-SCNC: 16 MMOL/L — SIGNIFICANT CHANGE UP (ref 5–17)
ANION GAP SERPL CALC-SCNC: 16 MMOL/L — SIGNIFICANT CHANGE UP (ref 5–17)
ANION GAP SERPL CALC-SCNC: 4 MMOL/L — LOW (ref 5–17)
ANION GAP SERPL CALC-SCNC: 5 MMOL/L — SIGNIFICANT CHANGE UP (ref 5–17)
ANION GAP SERPL CALC-SCNC: 6 MMOL/L — SIGNIFICANT CHANGE UP (ref 5–17)
ANION GAP SERPL CALC-SCNC: 7 MMOL/L — SIGNIFICANT CHANGE UP (ref 5–17)
ANION GAP SERPL CALC-SCNC: 8 MMOL/L — SIGNIFICANT CHANGE UP (ref 5–17)
ANION GAP SERPL CALC-SCNC: 9 MMOL/L — SIGNIFICANT CHANGE UP (ref 5–17)
ANION GAP SERPL CALC-SCNC: 9 MMOL/L — SIGNIFICANT CHANGE UP (ref 5–17)
ANISOCYTOSIS BLD QL: SLIGHT — SIGNIFICANT CHANGE UP
APAP SERPL-MCNC: 4 UG/ML — LOW (ref 10–30)
APPEARANCE UR: CLEAR — SIGNIFICANT CHANGE UP
APPEARANCE UR: CLEAR — SIGNIFICANT CHANGE UP
APTT BLD: 28.6 SEC — SIGNIFICANT CHANGE UP (ref 27.5–35.5)
APTT BLD: 35 SEC — SIGNIFICANT CHANGE UP (ref 27.5–35.5)
AST SERPL-CCNC: 11 U/L — SIGNIFICANT CHANGE UP (ref 10–40)
AST SERPL-CCNC: 13 U/L — SIGNIFICANT CHANGE UP (ref 10–40)
AST SERPL-CCNC: 13 U/L — SIGNIFICANT CHANGE UP (ref 10–40)
AST SERPL-CCNC: 14 U/L — SIGNIFICANT CHANGE UP (ref 10–40)
AST SERPL-CCNC: 16 U/L — SIGNIFICANT CHANGE UP (ref 10–40)
AST SERPL-CCNC: 18 U/L — SIGNIFICANT CHANGE UP (ref 10–40)
AST SERPL-CCNC: 20 U/L — SIGNIFICANT CHANGE UP (ref 10–40)
AST SERPL-CCNC: 22 U/L — SIGNIFICANT CHANGE UP (ref 10–40)
AST SERPL-CCNC: 23 U/L — SIGNIFICANT CHANGE UP (ref 10–40)
AST SERPL-CCNC: 23 U/L — SIGNIFICANT CHANGE UP (ref 10–40)
AST SERPL-CCNC: 27 U/L — SIGNIFICANT CHANGE UP (ref 10–40)
AST SERPL-CCNC: 33 U/L — SIGNIFICANT CHANGE UP (ref 10–40)
AST SERPL-CCNC: 4 U/L — LOW (ref 10–40)
AST SERPL-CCNC: 5 U/L — LOW (ref 10–40)
AST SERPL-CCNC: 6 U/L — LOW (ref 10–40)
AST SERPL-CCNC: 7 U/L — LOW (ref 10–40)
AST SERPL-CCNC: 8 U/L — LOW (ref 10–40)
AST SERPL-CCNC: 9 U/L — LOW (ref 10–40)
BACTERIA # UR AUTO: ABNORMAL /HPF
BACTERIA # UR AUTO: ABNORMAL /HPF
BARBITURATES UR SCN-MCNC: NEGATIVE — SIGNIFICANT CHANGE UP
BASE EXCESS BLDA CALC-SCNC: -1.5 MMOL/L — SIGNIFICANT CHANGE UP (ref -2–3)
BASE EXCESS BLDA CALC-SCNC: -10 MMOL/L — LOW (ref -2–3)
BASE EXCESS BLDA CALC-SCNC: -10 MMOL/L — LOW (ref -2–3)
BASE EXCESS BLDA CALC-SCNC: -2.8 MMOL/L — LOW (ref -2–3)
BASE EXCESS BLDA CALC-SCNC: -6.1 MMOL/L — LOW (ref -2–3)
BASE EXCESS BLDA CALC-SCNC: -7 MMOL/L — LOW (ref -2–3)
BASE EXCESS BLDA CALC-SCNC: -8 MMOL/L — LOW (ref -2–3)
BASE EXCESS BLDA CALC-SCNC: -8.8 MMOL/L — LOW (ref -2–3)
BASE EXCESS BLDA CALC-SCNC: -9 MMOL/L — LOW (ref -2–3)
BASE EXCESS BLDA CALC-SCNC: -9.9 MMOL/L — LOW (ref -2–3)
BASE EXCESS BLDA CALC-SCNC: 0.3 MMOL/L — SIGNIFICANT CHANGE UP (ref -2–3)
BASE EXCESS BLDA CALC-SCNC: 0.4 MMOL/L — SIGNIFICANT CHANGE UP (ref -2–3)
BASE EXCESS BLDA CALC-SCNC: 1.1 MMOL/L — SIGNIFICANT CHANGE UP (ref -2–3)
BASE EXCESS BLDA CALC-SCNC: 1.2 MMOL/L — SIGNIFICANT CHANGE UP (ref -2–3)
BASE EXCESS BLDA CALC-SCNC: 1.9 MMOL/L — SIGNIFICANT CHANGE UP (ref -2–3)
BASE EXCESS BLDA CALC-SCNC: SIGNIFICANT CHANGE UP MMOL/L (ref -2–3)
BASE EXCESS BLDV CALC-SCNC: -0.3 MMOL/L — SIGNIFICANT CHANGE UP
BASE EXCESS BLDV CALC-SCNC: -11.4 MMOL/L — SIGNIFICANT CHANGE UP
BASE EXCESS BLDV CALC-SCNC: -4.6 MMOL/L — SIGNIFICANT CHANGE UP
BASOPHILS # BLD AUTO: 0 K/UL — SIGNIFICANT CHANGE UP (ref 0–0.2)
BASOPHILS # BLD AUTO: 0.01 K/UL — SIGNIFICANT CHANGE UP (ref 0–0.2)
BASOPHILS # BLD AUTO: 0.02 K/UL — SIGNIFICANT CHANGE UP (ref 0–0.2)
BASOPHILS # BLD AUTO: 0.03 K/UL — SIGNIFICANT CHANGE UP (ref 0–0.2)
BASOPHILS # BLD AUTO: 0.04 K/UL — SIGNIFICANT CHANGE UP (ref 0–0.2)
BASOPHILS NFR BLD AUTO: 0 % — SIGNIFICANT CHANGE UP (ref 0–2)
BASOPHILS NFR BLD AUTO: 0.1 % — SIGNIFICANT CHANGE UP (ref 0–2)
BASOPHILS NFR BLD AUTO: 0.2 % — SIGNIFICANT CHANGE UP (ref 0–2)
BASOPHILS NFR BLD AUTO: 0.3 % — SIGNIFICANT CHANGE UP (ref 0–2)
BASOPHILS NFR BLD AUTO: 0.3 % — SIGNIFICANT CHANGE UP (ref 0–2)
BENZODIAZ UR-MCNC: NEGATIVE — SIGNIFICANT CHANGE UP
BILIRUB SERPL-MCNC: 0.1 MG/DL — LOW (ref 0.2–1.2)
BILIRUB SERPL-MCNC: 0.1 MG/DL — LOW (ref 0.2–1.2)
BILIRUB SERPL-MCNC: 0.2 MG/DL — SIGNIFICANT CHANGE UP (ref 0.2–1.2)
BILIRUB SERPL-MCNC: 0.3 MG/DL — SIGNIFICANT CHANGE UP (ref 0.2–1.2)
BILIRUB UR-MCNC: NEGATIVE — SIGNIFICANT CHANGE UP
BILIRUB UR-MCNC: NEGATIVE — SIGNIFICANT CHANGE UP
BLD GP AB SCN SERPL QL: SIGNIFICANT CHANGE UP
BLOOD GAS COMMENTS ARTERIAL: SIGNIFICANT CHANGE UP
BLOOD GAS COMMENTS, VENOUS: SIGNIFICANT CHANGE UP
BUN SERPL-MCNC: 119 MG/DL — HIGH (ref 7–18)
BUN SERPL-MCNC: 121 MG/DL — HIGH (ref 7–18)
BUN SERPL-MCNC: 124 MG/DL — HIGH (ref 7–18)
BUN SERPL-MCNC: 125 MG/DL — HIGH (ref 7–18)
BUN SERPL-MCNC: 130 MG/DL — HIGH (ref 7–18)
BUN SERPL-MCNC: 51 MG/DL — HIGH (ref 7–18)
BUN SERPL-MCNC: 52 MG/DL — HIGH (ref 7–18)
BUN SERPL-MCNC: 53 MG/DL — HIGH (ref 7–18)
BUN SERPL-MCNC: 54 MG/DL — HIGH (ref 7–18)
BUN SERPL-MCNC: 59 MG/DL — HIGH (ref 7–18)
BUN SERPL-MCNC: 61 MG/DL — HIGH (ref 7–18)
BUN SERPL-MCNC: 63 MG/DL — HIGH (ref 7–18)
BUN SERPL-MCNC: 64 MG/DL — HIGH (ref 7–18)
BUN SERPL-MCNC: 64 MG/DL — HIGH (ref 7–18)
BUN SERPL-MCNC: 66 MG/DL — HIGH (ref 7–18)
BUN SERPL-MCNC: 66 MG/DL — HIGH (ref 7–18)
BUN SERPL-MCNC: 67 MG/DL — HIGH (ref 7–18)
BUN SERPL-MCNC: 68 MG/DL — HIGH (ref 7–18)
BUN SERPL-MCNC: 73 MG/DL — HIGH (ref 7–18)
BUN SERPL-MCNC: 76 MG/DL — HIGH (ref 7–18)
BUN SERPL-MCNC: 82 MG/DL — HIGH (ref 7–18)
BUN SERPL-MCNC: 84 MG/DL — HIGH (ref 7–18)
BUN SERPL-MCNC: 89 MG/DL — HIGH (ref 7–18)
BUN SERPL-MCNC: 89 MG/DL — HIGH (ref 7–18)
BUN SERPL-MCNC: 91 MG/DL — HIGH (ref 7–18)
BUN SERPL-MCNC: 91 MG/DL — HIGH (ref 7–18)
BUN SERPL-MCNC: 93 MG/DL — HIGH (ref 7–18)
BUN SERPL-MCNC: 95 MG/DL — HIGH (ref 7–18)
BUN SERPL-MCNC: 97 MG/DL — HIGH (ref 7–18)
CA-I SERPL-SCNC: SIGNIFICANT CHANGE UP MMOL/L (ref 1.15–1.33)
CALCIUM SERPL-MCNC: 10 MG/DL — SIGNIFICANT CHANGE UP (ref 8.4–10.5)
CALCIUM SERPL-MCNC: 10.1 MG/DL — SIGNIFICANT CHANGE UP (ref 8.4–10.5)
CALCIUM SERPL-MCNC: 10.2 MG/DL — SIGNIFICANT CHANGE UP (ref 8.4–10.5)
CALCIUM SERPL-MCNC: 10.3 MG/DL — SIGNIFICANT CHANGE UP (ref 8.4–10.5)
CALCIUM SERPL-MCNC: 8.4 MG/DL — SIGNIFICANT CHANGE UP (ref 8.4–10.5)
CALCIUM SERPL-MCNC: 8.5 MG/DL — SIGNIFICANT CHANGE UP (ref 8.4–10.5)
CALCIUM SERPL-MCNC: 8.8 MG/DL — SIGNIFICANT CHANGE UP (ref 8.4–10.5)
CALCIUM SERPL-MCNC: 8.9 MG/DL — SIGNIFICANT CHANGE UP (ref 8.4–10.5)
CALCIUM SERPL-MCNC: 9.2 MG/DL — SIGNIFICANT CHANGE UP (ref 8.4–10.5)
CALCIUM SERPL-MCNC: 9.2 MG/DL — SIGNIFICANT CHANGE UP (ref 8.4–10.5)
CALCIUM SERPL-MCNC: 9.4 MG/DL — SIGNIFICANT CHANGE UP (ref 8.4–10.5)
CALCIUM SERPL-MCNC: 9.4 MG/DL — SIGNIFICANT CHANGE UP (ref 8.4–10.5)
CALCIUM SERPL-MCNC: 9.5 MG/DL — SIGNIFICANT CHANGE UP (ref 8.4–10.5)
CALCIUM SERPL-MCNC: 9.6 MG/DL — SIGNIFICANT CHANGE UP (ref 8.4–10.5)
CALCIUM SERPL-MCNC: 9.7 MG/DL — SIGNIFICANT CHANGE UP (ref 8.4–10.5)
CALCIUM SERPL-MCNC: 9.8 MG/DL — SIGNIFICANT CHANGE UP (ref 8.4–10.5)
CALCIUM SERPL-MCNC: 9.8 MG/DL — SIGNIFICANT CHANGE UP (ref 8.4–10.5)
CALCIUM SERPL-MCNC: 9.9 MG/DL — SIGNIFICANT CHANGE UP (ref 8.4–10.5)
CHLORIDE SERPL-SCNC: 101 MMOL/L — SIGNIFICANT CHANGE UP (ref 96–108)
CHLORIDE SERPL-SCNC: 104 MMOL/L — SIGNIFICANT CHANGE UP (ref 96–108)
CHLORIDE SERPL-SCNC: 105 MMOL/L — SIGNIFICANT CHANGE UP (ref 96–108)
CHLORIDE SERPL-SCNC: 106 MMOL/L — SIGNIFICANT CHANGE UP (ref 96–108)
CHLORIDE SERPL-SCNC: 108 MMOL/L — SIGNIFICANT CHANGE UP (ref 96–108)
CHLORIDE SERPL-SCNC: 109 MMOL/L — HIGH (ref 96–108)
CHLORIDE SERPL-SCNC: 111 MMOL/L — HIGH (ref 96–108)
CHLORIDE SERPL-SCNC: 111 MMOL/L — HIGH (ref 96–108)
CHLORIDE SERPL-SCNC: 112 MMOL/L — HIGH (ref 96–108)
CHLORIDE SERPL-SCNC: 115 MMOL/L — HIGH (ref 96–108)
CHLORIDE SERPL-SCNC: 116 MMOL/L — HIGH (ref 96–108)
CHLORIDE SERPL-SCNC: 117 MMOL/L — HIGH (ref 96–108)
CHLORIDE SERPL-SCNC: 118 MMOL/L — HIGH (ref 96–108)
CHLORIDE SERPL-SCNC: 119 MMOL/L — HIGH (ref 96–108)
CHLORIDE SERPL-SCNC: 98 MMOL/L — SIGNIFICANT CHANGE UP (ref 96–108)
CHLORIDE SERPL-SCNC: 99 MMOL/L — SIGNIFICANT CHANGE UP (ref 96–108)
CK MB BLD-MCNC: 4.5 % — HIGH (ref 0–3.5)
CK MB CFR SERPL CALC: 10.1 NG/ML — HIGH (ref 0–3.6)
CK SERPL-CCNC: 185 U/L — SIGNIFICANT CHANGE UP (ref 35–232)
CK SERPL-CCNC: 225 U/L — SIGNIFICANT CHANGE UP (ref 35–232)
CO2 SERPL-SCNC: 18 MMOL/L — LOW (ref 22–31)
CO2 SERPL-SCNC: 18 MMOL/L — LOW (ref 22–31)
CO2 SERPL-SCNC: 19 MMOL/L — LOW (ref 22–31)
CO2 SERPL-SCNC: 20 MMOL/L — LOW (ref 22–31)
CO2 SERPL-SCNC: 20 MMOL/L — LOW (ref 22–31)
CO2 SERPL-SCNC: 21 MMOL/L — LOW (ref 22–31)
CO2 SERPL-SCNC: 23 MMOL/L — SIGNIFICANT CHANGE UP (ref 22–31)
CO2 SERPL-SCNC: 24 MMOL/L — SIGNIFICANT CHANGE UP (ref 22–31)
CO2 SERPL-SCNC: 26 MMOL/L — SIGNIFICANT CHANGE UP (ref 22–31)
CO2 SERPL-SCNC: 27 MMOL/L — SIGNIFICANT CHANGE UP (ref 22–31)
CO2 SERPL-SCNC: 28 MMOL/L — SIGNIFICANT CHANGE UP (ref 22–31)
CO2 SERPL-SCNC: 30 MMOL/L — SIGNIFICANT CHANGE UP (ref 22–31)
CO2 SERPL-SCNC: 31 MMOL/L — SIGNIFICANT CHANGE UP (ref 22–31)
COCAINE METAB.OTHER UR-MCNC: NEGATIVE — SIGNIFICANT CHANGE UP
COLOR SPEC: YELLOW — SIGNIFICANT CHANGE UP
COLOR SPEC: YELLOW — SIGNIFICANT CHANGE UP
CREAT SERPL-MCNC: 3 MG/DL — HIGH (ref 0.5–1.3)
CREAT SERPL-MCNC: 3.11 MG/DL — HIGH (ref 0.5–1.3)
CREAT SERPL-MCNC: 3.34 MG/DL — HIGH (ref 0.5–1.3)
CREAT SERPL-MCNC: 3.42 MG/DL — HIGH (ref 0.5–1.3)
CREAT SERPL-MCNC: 3.43 MG/DL — HIGH (ref 0.5–1.3)
CREAT SERPL-MCNC: 3.46 MG/DL — HIGH (ref 0.5–1.3)
CREAT SERPL-MCNC: 3.5 MG/DL — HIGH (ref 0.5–1.3)
CREAT SERPL-MCNC: 3.56 MG/DL — HIGH (ref 0.5–1.3)
CREAT SERPL-MCNC: 3.58 MG/DL — HIGH (ref 0.5–1.3)
CREAT SERPL-MCNC: 3.62 MG/DL — HIGH (ref 0.5–1.3)
CREAT SERPL-MCNC: 3.65 MG/DL — HIGH (ref 0.5–1.3)
CREAT SERPL-MCNC: 3.67 MG/DL — HIGH (ref 0.5–1.3)
CREAT SERPL-MCNC: 3.69 MG/DL — HIGH (ref 0.5–1.3)
CREAT SERPL-MCNC: 3.71 MG/DL — HIGH (ref 0.5–1.3)
CREAT SERPL-MCNC: 3.72 MG/DL — HIGH (ref 0.5–1.3)
CREAT SERPL-MCNC: 3.79 MG/DL — HIGH (ref 0.5–1.3)
CREAT SERPL-MCNC: 4.06 MG/DL — HIGH (ref 0.5–1.3)
CREAT SERPL-MCNC: 4.24 MG/DL — HIGH (ref 0.5–1.3)
CREAT SERPL-MCNC: 4.32 MG/DL — HIGH (ref 0.5–1.3)
CREAT SERPL-MCNC: 4.66 MG/DL — HIGH (ref 0.5–1.3)
CREAT SERPL-MCNC: 5.03 MG/DL — HIGH (ref 0.5–1.3)
CREAT SERPL-MCNC: 5.2 MG/DL — HIGH (ref 0.5–1.3)
CREAT SERPL-MCNC: 5.61 MG/DL — HIGH (ref 0.5–1.3)
CREAT SERPL-MCNC: 5.68 MG/DL — HIGH (ref 0.5–1.3)
CREAT SERPL-MCNC: 5.68 MG/DL — HIGH (ref 0.5–1.3)
CREAT SERPL-MCNC: 5.82 MG/DL — HIGH (ref 0.5–1.3)
CREAT SERPL-MCNC: 6.03 MG/DL — HIGH (ref 0.5–1.3)
CREAT SERPL-MCNC: 6.14 MG/DL — HIGH (ref 0.5–1.3)
CREAT SERPL-MCNC: 6.44 MG/DL — HIGH (ref 0.5–1.3)
CULTURE RESULTS: NO GROWTH — SIGNIFICANT CHANGE UP
CULTURE RESULTS: NO GROWTH — SIGNIFICANT CHANGE UP
CULTURE RESULTS: SIGNIFICANT CHANGE UP
D DIMER BLD IA.RAPID-MCNC: 700 NG/ML DDU — HIGH
DIFF PNL FLD: ABNORMAL
DIFF PNL FLD: ABNORMAL
EGFR: 10 ML/MIN/1.73M2 — LOW
EGFR: 11 ML/MIN/1.73M2 — LOW
EGFR: 12 ML/MIN/1.73M2 — LOW
EGFR: 12 ML/MIN/1.73M2 — LOW
EGFR: 13 ML/MIN/1.73M2 — LOW
EGFR: 15 ML/MIN/1.73M2 — LOW
EGFR: 15 ML/MIN/1.73M2 — LOW
EGFR: 16 ML/MIN/1.73M2 — LOW
EGFR: 17 ML/MIN/1.73M2 — LOW
EGFR: 17 ML/MIN/1.73M2 — LOW
EGFR: 18 ML/MIN/1.73M2 — LOW
EGFR: 19 ML/MIN/1.73M2 — LOW
EGFR: 20 ML/MIN/1.73M2 — LOW
EGFR: 22 ML/MIN/1.73M2 — LOW
EGFR: 23 ML/MIN/1.73M2 — LOW
EGFR: 9 ML/MIN/1.73M2 — LOW
EOSINOPHIL # BLD AUTO: 0 K/UL — SIGNIFICANT CHANGE UP (ref 0–0.5)
EOSINOPHIL # BLD AUTO: 0.03 K/UL — SIGNIFICANT CHANGE UP (ref 0–0.5)
EOSINOPHIL # BLD AUTO: 0.06 K/UL — SIGNIFICANT CHANGE UP (ref 0–0.5)
EOSINOPHIL # BLD AUTO: 0.09 K/UL — SIGNIFICANT CHANGE UP (ref 0–0.5)
EOSINOPHIL # BLD AUTO: 0.19 K/UL — SIGNIFICANT CHANGE UP (ref 0–0.5)
EOSINOPHIL NFR BLD AUTO: 0 % — SIGNIFICANT CHANGE UP (ref 0–6)
EOSINOPHIL NFR BLD AUTO: 0.2 % — SIGNIFICANT CHANGE UP (ref 0–6)
EOSINOPHIL NFR BLD AUTO: 0.5 % — SIGNIFICANT CHANGE UP (ref 0–6)
EOSINOPHIL NFR BLD AUTO: 0.8 % — SIGNIFICANT CHANGE UP (ref 0–6)
EOSINOPHIL NFR BLD AUTO: 2 % — SIGNIFICANT CHANGE UP (ref 0–6)
EPI CELLS # UR: SIGNIFICANT CHANGE UP /HPF
EPI CELLS # UR: SIGNIFICANT CHANGE UP /HPF
FERRITIN SERPL-MCNC: 553 NG/ML — HIGH (ref 30–400)
FLUAV AG NPH QL: SIGNIFICANT CHANGE UP
FLUBV AG NPH QL: SIGNIFICANT CHANGE UP
GAS PNL BLDA: SIGNIFICANT CHANGE UP
GAS PNL BLDV: 133 MMOL/L — LOW (ref 136–145)
GAS PNL BLDV: SIGNIFICANT CHANGE UP
GLUCOSE SERPL-MCNC: 102 MG/DL — HIGH (ref 70–99)
GLUCOSE SERPL-MCNC: 102 MG/DL — HIGH (ref 70–99)
GLUCOSE SERPL-MCNC: 103 MG/DL — HIGH (ref 70–99)
GLUCOSE SERPL-MCNC: 104 MG/DL — HIGH (ref 70–99)
GLUCOSE SERPL-MCNC: 105 MG/DL — HIGH (ref 70–99)
GLUCOSE SERPL-MCNC: 111 MG/DL — HIGH (ref 70–99)
GLUCOSE SERPL-MCNC: 112 MG/DL — HIGH (ref 70–99)
GLUCOSE SERPL-MCNC: 114 MG/DL — HIGH (ref 70–99)
GLUCOSE SERPL-MCNC: 115 MG/DL — HIGH (ref 70–99)
GLUCOSE SERPL-MCNC: 116 MG/DL — HIGH (ref 70–99)
GLUCOSE SERPL-MCNC: 117 MG/DL — HIGH (ref 70–99)
GLUCOSE SERPL-MCNC: 119 MG/DL — HIGH (ref 70–99)
GLUCOSE SERPL-MCNC: 128 MG/DL — HIGH (ref 70–99)
GLUCOSE SERPL-MCNC: 131 MG/DL — HIGH (ref 70–99)
GLUCOSE SERPL-MCNC: 131 MG/DL — HIGH (ref 70–99)
GLUCOSE SERPL-MCNC: 135 MG/DL — HIGH (ref 70–99)
GLUCOSE SERPL-MCNC: 139 MG/DL — HIGH (ref 70–99)
GLUCOSE SERPL-MCNC: 139 MG/DL — HIGH (ref 70–99)
GLUCOSE SERPL-MCNC: 163 MG/DL — HIGH (ref 70–99)
GLUCOSE SERPL-MCNC: 174 MG/DL — HIGH (ref 70–99)
GLUCOSE SERPL-MCNC: 85 MG/DL — SIGNIFICANT CHANGE UP (ref 70–99)
GLUCOSE SERPL-MCNC: 90 MG/DL — SIGNIFICANT CHANGE UP (ref 70–99)
GLUCOSE SERPL-MCNC: 94 MG/DL — SIGNIFICANT CHANGE UP (ref 70–99)
GLUCOSE SERPL-MCNC: 97 MG/DL — SIGNIFICANT CHANGE UP (ref 70–99)
GLUCOSE SERPL-MCNC: 98 MG/DL — SIGNIFICANT CHANGE UP (ref 70–99)
GLUCOSE SERPL-MCNC: 99 MG/DL — SIGNIFICANT CHANGE UP (ref 70–99)
GLUCOSE SERPL-MCNC: 99 MG/DL — SIGNIFICANT CHANGE UP (ref 70–99)
GLUCOSE UR QL: NEGATIVE — SIGNIFICANT CHANGE UP
GLUCOSE UR QL: NEGATIVE — SIGNIFICANT CHANGE UP
GRAM STN FLD: SIGNIFICANT CHANGE UP
GRAM STN FLD: SIGNIFICANT CHANGE UP
HAV IGM SER-ACNC: SIGNIFICANT CHANGE UP
HBV CORE IGM SER-ACNC: SIGNIFICANT CHANGE UP
HBV SURFACE AG SER-ACNC: SIGNIFICANT CHANGE UP
HBV SURFACE AG SER-ACNC: SIGNIFICANT CHANGE UP
HCO3 BLDA-SCNC: 15 MMOL/L — LOW (ref 21–28)
HCO3 BLDA-SCNC: 17 MMOL/L — LOW (ref 21–28)
HCO3 BLDA-SCNC: 18 MMOL/L — LOW (ref 21–28)
HCO3 BLDA-SCNC: 19 MMOL/L — LOW (ref 21–28)
HCO3 BLDA-SCNC: 21 MMOL/L — SIGNIFICANT CHANGE UP (ref 21–28)
HCO3 BLDA-SCNC: 24 MMOL/L — SIGNIFICANT CHANGE UP (ref 21–28)
HCO3 BLDA-SCNC: 24 MMOL/L — SIGNIFICANT CHANGE UP (ref 21–28)
HCO3 BLDA-SCNC: 25 MMOL/L — SIGNIFICANT CHANGE UP (ref 21–28)
HCO3 BLDA-SCNC: 26 MMOL/L — SIGNIFICANT CHANGE UP (ref 21–28)
HCO3 BLDA-SCNC: 28 MMOL/L — SIGNIFICANT CHANGE UP (ref 21–28)
HCO3 BLDA-SCNC: 28 MMOL/L — SIGNIFICANT CHANGE UP (ref 21–28)
HCO3 BLDA-SCNC: 29 MMOL/L — HIGH (ref 21–28)
HCO3 BLDA-SCNC: SIGNIFICANT CHANGE UP MMOL/L (ref 21–28)
HCO3 BLDV-SCNC: 17 MMOL/L — LOW (ref 22–29)
HCO3 BLDV-SCNC: 25 MMOL/L — SIGNIFICANT CHANGE UP (ref 22–29)
HCO3 BLDV-SCNC: 28 MMOL/L — SIGNIFICANT CHANGE UP (ref 22–29)
HCT VFR BLD CALC: 21.6 % — LOW (ref 39–50)
HCT VFR BLD CALC: 22.2 % — LOW (ref 39–50)
HCT VFR BLD CALC: 23.4 % — LOW (ref 39–50)
HCT VFR BLD CALC: 23.8 % — LOW (ref 39–50)
HCT VFR BLD CALC: 23.9 % — LOW (ref 39–50)
HCT VFR BLD CALC: 24.1 % — LOW (ref 39–50)
HCT VFR BLD CALC: 24.3 % — LOW (ref 39–50)
HCT VFR BLD CALC: 24.4 % — LOW (ref 39–50)
HCT VFR BLD CALC: 24.7 % — LOW (ref 39–50)
HCT VFR BLD CALC: 24.7 % — LOW (ref 39–50)
HCT VFR BLD CALC: 25 % — LOW (ref 39–50)
HCT VFR BLD CALC: 25.2 % — LOW (ref 39–50)
HCT VFR BLD CALC: 25.2 % — LOW (ref 39–50)
HCT VFR BLD CALC: 25.3 % — LOW (ref 39–50)
HCT VFR BLD CALC: 25.5 % — LOW (ref 39–50)
HCT VFR BLD CALC: 25.5 % — LOW (ref 39–50)
HCT VFR BLD CALC: 25.8 % — LOW (ref 39–50)
HCT VFR BLD CALC: 26 % — LOW (ref 39–50)
HCT VFR BLD CALC: 26.2 % — LOW (ref 39–50)
HCT VFR BLD CALC: 26.4 % — LOW (ref 39–50)
HCT VFR BLD CALC: 26.6 % — LOW (ref 39–50)
HCT VFR BLD CALC: 27 % — LOW (ref 39–50)
HCT VFR BLD CALC: 27.6 % — LOW (ref 39–50)
HCT VFR BLD CALC: 27.9 % — LOW (ref 39–50)
HCT VFR BLD CALC: 28 % — LOW (ref 39–50)
HCT VFR BLD CALC: 28.3 % — LOW (ref 39–50)
HCT VFR BLD CALC: 28.4 % — LOW (ref 39–50)
HCT VFR BLD CALC: 29.2 % — LOW (ref 39–50)
HCV AB S/CO SERPL IA: 0.08 S/CO — SIGNIFICANT CHANGE UP (ref 0–0.99)
HCV AB SERPL-IMP: SIGNIFICANT CHANGE UP
HGB BLD-MCNC: 7 G/DL — CRITICAL LOW (ref 13–17)
HGB BLD-MCNC: 7 G/DL — CRITICAL LOW (ref 13–17)
HGB BLD-MCNC: 7.1 G/DL — LOW (ref 13–17)
HGB BLD-MCNC: 7.2 G/DL — LOW (ref 13–17)
HGB BLD-MCNC: 7.3 G/DL — LOW (ref 13–17)
HGB BLD-MCNC: 7.4 G/DL — LOW (ref 13–17)
HGB BLD-MCNC: 7.4 G/DL — LOW (ref 13–17)
HGB BLD-MCNC: 7.5 G/DL — LOW (ref 13–17)
HGB BLD-MCNC: 7.6 G/DL — LOW (ref 13–17)
HGB BLD-MCNC: 7.7 G/DL — LOW (ref 13–17)
HGB BLD-MCNC: 7.9 G/DL — LOW (ref 13–17)
HGB BLD-MCNC: 8 G/DL — LOW (ref 13–17)
HGB BLD-MCNC: 8 G/DL — LOW (ref 13–17)
HGB BLD-MCNC: 8.1 G/DL — LOW (ref 13–17)
HGB BLD-MCNC: 8.5 G/DL — LOW (ref 13–17)
HGB BLD-MCNC: 9.2 G/DL — LOW (ref 13–17)
HOROWITZ INDEX BLDA+IHG-RTO: 100 — SIGNIFICANT CHANGE UP
HOROWITZ INDEX BLDA+IHG-RTO: 40 — SIGNIFICANT CHANGE UP
HOROWITZ INDEX BLDA+IHG-RTO: 50 — SIGNIFICANT CHANGE UP
HOROWITZ INDEX BLDA+IHG-RTO: 60 — SIGNIFICANT CHANGE UP
HOROWITZ INDEX BLDA+IHG-RTO: 70 — SIGNIFICANT CHANGE UP
HOROWITZ INDEX BLDA+IHG-RTO: 70 — SIGNIFICANT CHANGE UP
HOROWITZ INDEX BLDA+IHG-RTO: 95 — SIGNIFICANT CHANGE UP
HYPOCHROMIA BLD QL: SLIGHT — SIGNIFICANT CHANGE UP
IMM GRANULOCYTES NFR BLD AUTO: 0.6 % — SIGNIFICANT CHANGE UP (ref 0–1.5)
IMM GRANULOCYTES NFR BLD AUTO: 0.9 % — SIGNIFICANT CHANGE UP (ref 0–1.5)
IMM GRANULOCYTES NFR BLD AUTO: 0.9 % — SIGNIFICANT CHANGE UP (ref 0–1.5)
IMM GRANULOCYTES NFR BLD AUTO: 1.9 % — HIGH (ref 0–1.5)
IMM GRANULOCYTES NFR BLD AUTO: 2.2 % — HIGH (ref 0–0.9)
IMM GRANULOCYTES NFR BLD AUTO: 2.6 % — HIGH (ref 0–0.9)
INR BLD: 0.93 RATIO — SIGNIFICANT CHANGE UP (ref 0.88–1.16)
INR BLD: 0.99 RATIO — SIGNIFICANT CHANGE UP (ref 0.88–1.16)
IRON SATN MFR SERPL: 18 % — LOW (ref 20–55)
IRON SATN MFR SERPL: 29 UG/DL — LOW (ref 65–170)
IRON SATN MFR SERPL: 48 % — SIGNIFICANT CHANGE UP (ref 20–55)
IRON SATN MFR SERPL: 99 UG/DL — SIGNIFICANT CHANGE UP (ref 65–170)
KETONES UR-MCNC: NEGATIVE — SIGNIFICANT CHANGE UP
KETONES UR-MCNC: NEGATIVE — SIGNIFICANT CHANGE UP
LACTATE BLDV-MCNC: <0.45 MMOL/L — LOW (ref 0.5–2)
LACTATE SERPL-SCNC: 1.2 MMOL/L — SIGNIFICANT CHANGE UP (ref 0.7–2)
LACTATE SERPL-SCNC: 1.4 MMOL/L — SIGNIFICANT CHANGE UP (ref 0.7–2)
LEGIONELLA AG UR QL: NEGATIVE — SIGNIFICANT CHANGE UP
LEUKOCYTE ESTERASE UR-ACNC: ABNORMAL
LEUKOCYTE ESTERASE UR-ACNC: NEGATIVE — SIGNIFICANT CHANGE UP
LEVETIRACETAM SERPL-MCNC: 10.3 UG/ML — SIGNIFICANT CHANGE UP (ref 10–40)
LEVETIRACETAM SERPL-MCNC: 16.2 UG/ML — SIGNIFICANT CHANGE UP (ref 10–40)
LYMPHOCYTES # BLD AUTO: 0.17 K/UL — LOW (ref 1–3.3)
LYMPHOCYTES # BLD AUTO: 0.27 K/UL — LOW (ref 1–3.3)
LYMPHOCYTES # BLD AUTO: 0.5 K/UL — LOW (ref 1–3.3)
LYMPHOCYTES # BLD AUTO: 0.64 K/UL — LOW (ref 1–3.3)
LYMPHOCYTES # BLD AUTO: 0.67 K/UL — LOW (ref 1–3.3)
LYMPHOCYTES # BLD AUTO: 0.69 K/UL — LOW (ref 1–3.3)
LYMPHOCYTES # BLD AUTO: 0.79 K/UL — LOW (ref 1–3.3)
LYMPHOCYTES # BLD AUTO: 0.83 K/UL — LOW (ref 1–3.3)
LYMPHOCYTES # BLD AUTO: 0.92 K/UL — LOW (ref 1–3.3)
LYMPHOCYTES # BLD AUTO: 1.2 % — LOW (ref 13–44)
LYMPHOCYTES # BLD AUTO: 10.3 % — LOW (ref 13–44)
LYMPHOCYTES # BLD AUTO: 11 % — LOW (ref 13–44)
LYMPHOCYTES # BLD AUTO: 3 % — LOW (ref 13–44)
LYMPHOCYTES # BLD AUTO: 3.5 % — LOW (ref 13–44)
LYMPHOCYTES # BLD AUTO: 5 % — LOW (ref 13–44)
LYMPHOCYTES # BLD AUTO: 5.9 % — LOW (ref 13–44)
LYMPHOCYTES # BLD AUTO: 7 % — LOW (ref 13–44)
LYMPHOCYTES # BLD AUTO: 7 % — LOW (ref 13–44)
MAGNESIUM SERPL-MCNC: 1.8 MG/DL — SIGNIFICANT CHANGE UP (ref 1.6–2.6)
MAGNESIUM SERPL-MCNC: 1.8 MG/DL — SIGNIFICANT CHANGE UP (ref 1.6–2.6)
MAGNESIUM SERPL-MCNC: 1.9 MG/DL — SIGNIFICANT CHANGE UP (ref 1.6–2.6)
MAGNESIUM SERPL-MCNC: 2 MG/DL — SIGNIFICANT CHANGE UP (ref 1.6–2.6)
MAGNESIUM SERPL-MCNC: 2.1 MG/DL — SIGNIFICANT CHANGE UP (ref 1.6–2.6)
MAGNESIUM SERPL-MCNC: 2.2 MG/DL — SIGNIFICANT CHANGE UP (ref 1.6–2.6)
MAGNESIUM SERPL-MCNC: 2.3 MG/DL — SIGNIFICANT CHANGE UP (ref 1.6–2.6)
MAGNESIUM SERPL-MCNC: 2.3 MG/DL — SIGNIFICANT CHANGE UP (ref 1.6–2.6)
MAGNESIUM SERPL-MCNC: 2.4 MG/DL — SIGNIFICANT CHANGE UP (ref 1.6–2.6)
MAGNESIUM SERPL-MCNC: 2.5 MG/DL — SIGNIFICANT CHANGE UP (ref 1.6–2.6)
MAGNESIUM SERPL-MCNC: 2.5 MG/DL — SIGNIFICANT CHANGE UP (ref 1.6–2.6)
MAGNESIUM SERPL-MCNC: 2.6 MG/DL — SIGNIFICANT CHANGE UP (ref 1.6–2.6)
MAGNESIUM SERPL-MCNC: 2.9 MG/DL — HIGH (ref 1.6–2.6)
MAGNESIUM SERPL-MCNC: 3 MG/DL — HIGH (ref 1.6–2.6)
MAGNESIUM SERPL-MCNC: 3.2 MG/DL — HIGH (ref 1.6–2.6)
MAGNESIUM SERPL-MCNC: 3.3 MG/DL — HIGH (ref 1.6–2.6)
MAGNESIUM SERPL-MCNC: 3.4 MG/DL — HIGH (ref 1.6–2.6)
MAGNESIUM SERPL-MCNC: 3.5 MG/DL — HIGH (ref 1.6–2.6)
MANUAL SMEAR VERIFICATION: SIGNIFICANT CHANGE UP
MCHC RBC-ENTMCNC: 28.2 GM/DL — LOW (ref 32–36)
MCHC RBC-ENTMCNC: 28.3 GM/DL — LOW (ref 32–36)
MCHC RBC-ENTMCNC: 28.4 GM/DL — LOW (ref 32–36)
MCHC RBC-ENTMCNC: 28.7 GM/DL — LOW (ref 32–36)
MCHC RBC-ENTMCNC: 28.8 GM/DL — LOW (ref 32–36)
MCHC RBC-ENTMCNC: 29.1 GM/DL — LOW (ref 32–36)
MCHC RBC-ENTMCNC: 29.1 PG — SIGNIFICANT CHANGE UP (ref 27–34)
MCHC RBC-ENTMCNC: 29.4 GM/DL — LOW (ref 32–36)
MCHC RBC-ENTMCNC: 29.5 PG — SIGNIFICANT CHANGE UP (ref 27–34)
MCHC RBC-ENTMCNC: 29.6 GM/DL — LOW (ref 32–36)
MCHC RBC-ENTMCNC: 29.6 PG — SIGNIFICANT CHANGE UP (ref 27–34)
MCHC RBC-ENTMCNC: 29.7 PG — SIGNIFICANT CHANGE UP (ref 27–34)
MCHC RBC-ENTMCNC: 29.7 PG — SIGNIFICANT CHANGE UP (ref 27–34)
MCHC RBC-ENTMCNC: 29.8 PG — SIGNIFICANT CHANGE UP (ref 27–34)
MCHC RBC-ENTMCNC: 29.9 PG — SIGNIFICANT CHANGE UP (ref 27–34)
MCHC RBC-ENTMCNC: 30 GM/DL — LOW (ref 32–36)
MCHC RBC-ENTMCNC: 30 GM/DL — LOW (ref 32–36)
MCHC RBC-ENTMCNC: 30 PG — SIGNIFICANT CHANGE UP (ref 27–34)
MCHC RBC-ENTMCNC: 30.1 PG — SIGNIFICANT CHANGE UP (ref 27–34)
MCHC RBC-ENTMCNC: 30.2 GM/DL — LOW (ref 32–36)
MCHC RBC-ENTMCNC: 30.2 PG — SIGNIFICANT CHANGE UP (ref 27–34)
MCHC RBC-ENTMCNC: 30.3 GM/DL — LOW (ref 32–36)
MCHC RBC-ENTMCNC: 30.3 PG — SIGNIFICANT CHANGE UP (ref 27–34)
MCHC RBC-ENTMCNC: 30.4 PG — SIGNIFICANT CHANGE UP (ref 27–34)
MCHC RBC-ENTMCNC: 30.5 GM/DL — LOW (ref 32–36)
MCHC RBC-ENTMCNC: 30.6 PG — SIGNIFICANT CHANGE UP (ref 27–34)
MCHC RBC-ENTMCNC: 30.7 GM/DL — LOW (ref 32–36)
MCHC RBC-ENTMCNC: 30.8 GM/DL — LOW (ref 32–36)
MCHC RBC-ENTMCNC: 31.2 PG — SIGNIFICANT CHANGE UP (ref 27–34)
MCHC RBC-ENTMCNC: 31.3 PG — SIGNIFICANT CHANGE UP (ref 27–34)
MCHC RBC-ENTMCNC: 31.3 PG — SIGNIFICANT CHANGE UP (ref 27–34)
MCHC RBC-ENTMCNC: 31.4 GM/DL — LOW (ref 32–36)
MCHC RBC-ENTMCNC: 31.4 PG — SIGNIFICANT CHANGE UP (ref 27–34)
MCHC RBC-ENTMCNC: 31.5 GM/DL — LOW (ref 32–36)
MCHC RBC-ENTMCNC: 31.5 PG — SIGNIFICANT CHANGE UP (ref 27–34)
MCHC RBC-ENTMCNC: 31.7 GM/DL — LOW (ref 32–36)
MCHC RBC-ENTMCNC: 31.8 GM/DL — LOW (ref 32–36)
MCHC RBC-ENTMCNC: 31.9 PG — SIGNIFICANT CHANGE UP (ref 27–34)
MCHC RBC-ENTMCNC: 32 PG — SIGNIFICANT CHANGE UP (ref 27–34)
MCHC RBC-ENTMCNC: 32.1 GM/DL — SIGNIFICANT CHANGE UP (ref 32–36)
MCHC RBC-ENTMCNC: 32.4 GM/DL — SIGNIFICANT CHANGE UP (ref 32–36)
MCHC RBC-ENTMCNC: 32.7 PG — SIGNIFICANT CHANGE UP (ref 27–34)
MCHC RBC-ENTMCNC: 33.3 GM/DL — SIGNIFICANT CHANGE UP (ref 32–36)
MCV RBC AUTO: 100.7 FL — HIGH (ref 80–100)
MCV RBC AUTO: 101.2 FL — HIGH (ref 80–100)
MCV RBC AUTO: 101.5 FL — HIGH (ref 80–100)
MCV RBC AUTO: 102 FL — HIGH (ref 80–100)
MCV RBC AUTO: 102.8 FL — HIGH (ref 80–100)
MCV RBC AUTO: 103 FL — HIGH (ref 80–100)
MCV RBC AUTO: 103.1 FL — HIGH (ref 80–100)
MCV RBC AUTO: 103.3 FL — HIGH (ref 80–100)
MCV RBC AUTO: 103.9 FL — HIGH (ref 80–100)
MCV RBC AUTO: 104.3 FL — HIGH (ref 80–100)
MCV RBC AUTO: 104.4 FL — HIGH (ref 80–100)
MCV RBC AUTO: 104.9 FL — HIGH (ref 80–100)
MCV RBC AUTO: 105.1 FL — HIGH (ref 80–100)
MCV RBC AUTO: 105.2 FL — HIGH (ref 80–100)
MCV RBC AUTO: 105.6 FL — HIGH (ref 80–100)
MCV RBC AUTO: 106.1 FL — HIGH (ref 80–100)
MCV RBC AUTO: 89.9 FL — SIGNIFICANT CHANGE UP (ref 80–100)
MCV RBC AUTO: 91.9 FL — SIGNIFICANT CHANGE UP (ref 80–100)
MCV RBC AUTO: 93.1 FL — SIGNIFICANT CHANGE UP (ref 80–100)
MCV RBC AUTO: 93.4 FL — SIGNIFICANT CHANGE UP (ref 80–100)
MCV RBC AUTO: 93.9 FL — SIGNIFICANT CHANGE UP (ref 80–100)
MCV RBC AUTO: 94.4 FL — SIGNIFICANT CHANGE UP (ref 80–100)
MCV RBC AUTO: 94.5 FL — SIGNIFICANT CHANGE UP (ref 80–100)
MCV RBC AUTO: 94.9 FL — SIGNIFICANT CHANGE UP (ref 80–100)
MCV RBC AUTO: 97.8 FL — SIGNIFICANT CHANGE UP (ref 80–100)
MCV RBC AUTO: 98.8 FL — SIGNIFICANT CHANGE UP (ref 80–100)
MCV RBC AUTO: 98.8 FL — SIGNIFICANT CHANGE UP (ref 80–100)
MCV RBC AUTO: 99.2 FL — SIGNIFICANT CHANGE UP (ref 80–100)
METAMYELOCYTES # FLD: 2 % — HIGH (ref 0–0)
METHADONE UR-MCNC: NEGATIVE — SIGNIFICANT CHANGE UP
MICROCYTES BLD QL: SLIGHT — SIGNIFICANT CHANGE UP
MONOCYTES # BLD AUTO: 0.18 K/UL — SIGNIFICANT CHANGE UP (ref 0–0.9)
MONOCYTES # BLD AUTO: 0.38 K/UL — SIGNIFICANT CHANGE UP (ref 0–0.9)
MONOCYTES # BLD AUTO: 0.41 K/UL — SIGNIFICANT CHANGE UP (ref 0–0.9)
MONOCYTES # BLD AUTO: 0.69 K/UL — SIGNIFICANT CHANGE UP (ref 0–0.9)
MONOCYTES # BLD AUTO: 0.77 K/UL — SIGNIFICANT CHANGE UP (ref 0–0.9)
MONOCYTES # BLD AUTO: 0.9 K/UL — SIGNIFICANT CHANGE UP (ref 0–0.9)
MONOCYTES # BLD AUTO: 0.96 K/UL — HIGH (ref 0–0.9)
MONOCYTES # BLD AUTO: 1.05 K/UL — HIGH (ref 0–0.9)
MONOCYTES # BLD AUTO: 1.07 K/UL — HIGH (ref 0–0.9)
MONOCYTES NFR BLD AUTO: 2 % — SIGNIFICANT CHANGE UP (ref 2–14)
MONOCYTES NFR BLD AUTO: 2.9 % — SIGNIFICANT CHANGE UP (ref 2–14)
MONOCYTES NFR BLD AUTO: 4 % — SIGNIFICANT CHANGE UP (ref 2–14)
MONOCYTES NFR BLD AUTO: 6.7 % — SIGNIFICANT CHANGE UP (ref 2–14)
MONOCYTES NFR BLD AUTO: 7.1 % — SIGNIFICANT CHANGE UP (ref 2–14)
MONOCYTES NFR BLD AUTO: 9 % — SIGNIFICANT CHANGE UP (ref 2–14)
MONOCYTES NFR BLD AUTO: 9.2 % — SIGNIFICANT CHANGE UP (ref 2–14)
MRSA PCR RESULT.: DETECTED
MRSA PCR RESULT.: SIGNIFICANT CHANGE UP
MYELOCYTES NFR BLD: 2 % — HIGH (ref 0–0)
NEUTROPHILS # BLD AUTO: 10.88 K/UL — HIGH (ref 1.8–7.4)
NEUTROPHILS # BLD AUTO: 12.36 K/UL — HIGH (ref 1.8–7.4)
NEUTROPHILS # BLD AUTO: 13.12 K/UL — HIGH (ref 1.8–7.4)
NEUTROPHILS # BLD AUTO: 6.12 K/UL — SIGNIFICANT CHANGE UP (ref 1.8–7.4)
NEUTROPHILS # BLD AUTO: 6.62 K/UL — SIGNIFICANT CHANGE UP (ref 1.8–7.4)
NEUTROPHILS # BLD AUTO: 7.81 K/UL — HIGH (ref 1.8–7.4)
NEUTROPHILS # BLD AUTO: 8.63 K/UL — HIGH (ref 1.8–7.4)
NEUTROPHILS # BLD AUTO: 9.4 K/UL — HIGH (ref 1.8–7.4)
NEUTROPHILS # BLD AUTO: 9.69 K/UL — HIGH (ref 1.8–7.4)
NEUTROPHILS NFR BLD AUTO: 79 % — HIGH (ref 43–77)
NEUTROPHILS NFR BLD AUTO: 79 % — HIGH (ref 43–77)
NEUTROPHILS NFR BLD AUTO: 79.7 % — HIGH (ref 43–77)
NEUTROPHILS NFR BLD AUTO: 82 % — HIGH (ref 43–77)
NEUTROPHILS NFR BLD AUTO: 83.1 % — HIGH (ref 43–77)
NEUTROPHILS NFR BLD AUTO: 85.2 % — HIGH (ref 43–77)
NEUTROPHILS NFR BLD AUTO: 86.8 % — HIGH (ref 43–77)
NEUTROPHILS NFR BLD AUTO: 93.5 % — HIGH (ref 43–77)
NEUTROPHILS NFR BLD AUTO: 95 % — HIGH (ref 43–77)
NITRITE UR-MCNC: NEGATIVE — SIGNIFICANT CHANGE UP
NITRITE UR-MCNC: NEGATIVE — SIGNIFICANT CHANGE UP
NRBC # BLD: 0 /100 WBCS — SIGNIFICANT CHANGE UP (ref 0–0)
NRBC # BLD: 0 /100 — SIGNIFICANT CHANGE UP (ref 0–0)
NRBC # BLD: 0 /100 — SIGNIFICANT CHANGE UP (ref 0–0)
NT-PROBNP SERPL-SCNC: 1750 PG/ML — HIGH (ref 0–125)
NT-PROBNP SERPL-SCNC: 1763 PG/ML — HIGH (ref 0–125)
OB PNL STL: NEGATIVE — SIGNIFICANT CHANGE UP
OPIATES UR-MCNC: NEGATIVE — SIGNIFICANT CHANGE UP
OSMOLALITY UR: 301 MOS/KG — SIGNIFICANT CHANGE UP (ref 50–1200)
PCO2 BLDA: 32 MMHG — LOW (ref 35–48)
PCO2 BLDA: 34 MMHG — LOW (ref 35–48)
PCO2 BLDA: 37 MMHG — SIGNIFICANT CHANGE UP (ref 35–48)
PCO2 BLDA: 38 MMHG — SIGNIFICANT CHANGE UP (ref 35–48)
PCO2 BLDA: 39 MMHG — SIGNIFICANT CHANGE UP (ref 35–48)
PCO2 BLDA: 40 MMHG — SIGNIFICANT CHANGE UP (ref 35–48)
PCO2 BLDA: 41 MMHG — SIGNIFICANT CHANGE UP (ref 35–48)
PCO2 BLDA: 43 MMHG — SIGNIFICANT CHANGE UP (ref 35–48)
PCO2 BLDA: 44 MMHG — SIGNIFICANT CHANGE UP (ref 35–48)
PCO2 BLDA: 46 MMHG — SIGNIFICANT CHANGE UP (ref 35–48)
PCO2 BLDA: 46 MMHG — SIGNIFICANT CHANGE UP (ref 35–48)
PCO2 BLDA: 49 MMHG — HIGH (ref 35–48)
PCO2 BLDA: 55 MMHG — HIGH (ref 35–48)
PCO2 BLDA: 55 MMHG — HIGH (ref 35–48)
PCO2 BLDA: 57 MMHG — HIGH (ref 35–48)
PCO2 BLDA: >125 MMHG — CRITICAL HIGH (ref 35–48)
PCO2 BLDV: 52 MMHG — SIGNIFICANT CHANGE UP (ref 42–55)
PCO2 BLDV: 65 MMHG — HIGH (ref 42–55)
PCO2 BLDV: 65 MMHG — HIGH (ref 42–55)
PCP SPEC-MCNC: SIGNIFICANT CHANGE UP
PCP UR-MCNC: NEGATIVE — SIGNIFICANT CHANGE UP
PH BLDA: 7.2 — CRITICAL LOW (ref 7.35–7.45)
PH BLDA: 7.22 — LOW (ref 7.35–7.45)
PH BLDA: 7.26 — LOW (ref 7.35–7.45)
PH BLDA: 7.26 — LOW (ref 7.35–7.45)
PH BLDA: 7.27 — LOW (ref 7.35–7.45)
PH BLDA: 7.29 — LOW (ref 7.35–7.45)
PH BLDA: 7.3 — LOW (ref 7.35–7.45)
PH BLDA: 7.31 — LOW (ref 7.35–7.45)
PH BLDA: 7.32 — LOW (ref 7.35–7.45)
PH BLDA: 7.33 — LOW (ref 7.35–7.45)
PH BLDA: 7.35 — SIGNIFICANT CHANGE UP (ref 7.35–7.45)
PH BLDA: 7.41 — SIGNIFICANT CHANGE UP (ref 7.35–7.45)
PH BLDA: 7.42 — SIGNIFICANT CHANGE UP (ref 7.35–7.45)
PH BLDA: <7 — CRITICAL LOW (ref 7.35–7.45)
PH BLDV: 7.13 — LOW (ref 7.32–7.43)
PH BLDV: 7.19 — LOW (ref 7.32–7.43)
PH BLDV: 7.25 — LOW (ref 7.32–7.43)
PH UR: 5 — SIGNIFICANT CHANGE UP (ref 5–8)
PH UR: 5 — SIGNIFICANT CHANGE UP (ref 5–8)
PHOSPHATE SERPL-MCNC: 2.7 MG/DL — SIGNIFICANT CHANGE UP (ref 2.5–4.5)
PHOSPHATE SERPL-MCNC: 2.8 MG/DL — SIGNIFICANT CHANGE UP (ref 2.5–4.5)
PHOSPHATE SERPL-MCNC: 3.1 MG/DL — SIGNIFICANT CHANGE UP (ref 2.5–4.5)
PHOSPHATE SERPL-MCNC: 3.1 MG/DL — SIGNIFICANT CHANGE UP (ref 2.5–4.5)
PHOSPHATE SERPL-MCNC: 3.6 MG/DL — SIGNIFICANT CHANGE UP (ref 2.5–4.5)
PHOSPHATE SERPL-MCNC: 3.7 MG/DL — SIGNIFICANT CHANGE UP (ref 2.5–4.5)
PHOSPHATE SERPL-MCNC: 3.8 MG/DL — SIGNIFICANT CHANGE UP (ref 2.5–4.5)
PHOSPHATE SERPL-MCNC: 4 MG/DL — SIGNIFICANT CHANGE UP (ref 2.5–4.5)
PHOSPHATE SERPL-MCNC: 4.1 MG/DL — SIGNIFICANT CHANGE UP (ref 2.5–4.5)
PHOSPHATE SERPL-MCNC: 4.6 MG/DL — HIGH (ref 2.5–4.5)
PHOSPHATE SERPL-MCNC: 4.9 MG/DL — HIGH (ref 2.5–4.5)
PHOSPHATE SERPL-MCNC: 5 MG/DL — HIGH (ref 2.5–4.5)
PHOSPHATE SERPL-MCNC: 5.3 MG/DL — HIGH (ref 2.5–4.5)
PHOSPHATE SERPL-MCNC: 5.4 MG/DL — HIGH (ref 2.5–4.5)
PHOSPHATE SERPL-MCNC: 5.6 MG/DL — HIGH (ref 2.5–4.5)
PHOSPHATE SERPL-MCNC: 5.6 MG/DL — HIGH (ref 2.5–4.5)
PHOSPHATE SERPL-MCNC: 6.7 MG/DL — HIGH (ref 2.5–4.5)
PLAT MORPH BLD: NORMAL — SIGNIFICANT CHANGE UP
PLATELET # BLD AUTO: 149 K/UL — LOW (ref 150–400)
PLATELET # BLD AUTO: 151 K/UL — SIGNIFICANT CHANGE UP (ref 150–400)
PLATELET # BLD AUTO: 161 K/UL — SIGNIFICANT CHANGE UP (ref 150–400)
PLATELET # BLD AUTO: 163 K/UL — SIGNIFICANT CHANGE UP (ref 150–400)
PLATELET # BLD AUTO: 165 K/UL — SIGNIFICANT CHANGE UP (ref 150–400)
PLATELET # BLD AUTO: 182 K/UL — SIGNIFICANT CHANGE UP (ref 150–400)
PLATELET # BLD AUTO: 186 K/UL — SIGNIFICANT CHANGE UP (ref 150–400)
PLATELET # BLD AUTO: 196 K/UL — SIGNIFICANT CHANGE UP (ref 150–400)
PLATELET # BLD AUTO: 199 K/UL — SIGNIFICANT CHANGE UP (ref 150–400)
PLATELET # BLD AUTO: 228 K/UL — SIGNIFICANT CHANGE UP (ref 150–400)
PLATELET # BLD AUTO: 229 K/UL — SIGNIFICANT CHANGE UP (ref 150–400)
PLATELET # BLD AUTO: 234 K/UL — SIGNIFICANT CHANGE UP (ref 150–400)
PLATELET # BLD AUTO: 235 K/UL — SIGNIFICANT CHANGE UP (ref 150–400)
PLATELET # BLD AUTO: 240 K/UL — SIGNIFICANT CHANGE UP (ref 150–400)
PLATELET # BLD AUTO: 240 K/UL — SIGNIFICANT CHANGE UP (ref 150–400)
PLATELET # BLD AUTO: 250 K/UL — SIGNIFICANT CHANGE UP (ref 150–400)
PLATELET # BLD AUTO: 250 K/UL — SIGNIFICANT CHANGE UP (ref 150–400)
PLATELET # BLD AUTO: 255 K/UL — SIGNIFICANT CHANGE UP (ref 150–400)
PLATELET # BLD AUTO: 257 K/UL — SIGNIFICANT CHANGE UP (ref 150–400)
PLATELET # BLD AUTO: 262 K/UL — SIGNIFICANT CHANGE UP (ref 150–400)
PLATELET # BLD AUTO: 262 K/UL — SIGNIFICANT CHANGE UP (ref 150–400)
PLATELET # BLD AUTO: 263 K/UL — SIGNIFICANT CHANGE UP (ref 150–400)
PLATELET # BLD AUTO: 265 K/UL — SIGNIFICANT CHANGE UP (ref 150–400)
PLATELET # BLD AUTO: 268 K/UL — SIGNIFICANT CHANGE UP (ref 150–400)
PLATELET # BLD AUTO: 273 K/UL — SIGNIFICANT CHANGE UP (ref 150–400)
PLATELET # BLD AUTO: 279 K/UL — SIGNIFICANT CHANGE UP (ref 150–400)
PLATELET # BLD AUTO: 315 K/UL — SIGNIFICANT CHANGE UP (ref 150–400)
PLATELET # BLD AUTO: 319 K/UL — SIGNIFICANT CHANGE UP (ref 150–400)
PLATELET COUNT - ESTIMATE: NORMAL — SIGNIFICANT CHANGE UP
PO2 BLDA: 102 MMHG — SIGNIFICANT CHANGE UP (ref 83–108)
PO2 BLDA: 107 MMHG — SIGNIFICANT CHANGE UP (ref 83–108)
PO2 BLDA: 112 MMHG — HIGH (ref 83–108)
PO2 BLDA: 113 MMHG — HIGH (ref 83–108)
PO2 BLDA: 143 MMHG — HIGH (ref 83–108)
PO2 BLDA: 166 MMHG — HIGH (ref 83–108)
PO2 BLDA: 171 MMHG — HIGH (ref 83–108)
PO2 BLDA: 190 MMHG — HIGH (ref 83–108)
PO2 BLDA: 77 MMHG — LOW (ref 83–108)
PO2 BLDA: 79 MMHG — LOW (ref 83–108)
PO2 BLDA: 80 MMHG — LOW (ref 83–108)
PO2 BLDA: 81 MMHG — LOW (ref 83–108)
PO2 BLDA: 91 MMHG — SIGNIFICANT CHANGE UP (ref 83–108)
PO2 BLDA: 95 MMHG — SIGNIFICANT CHANGE UP (ref 83–108)
PO2 BLDA: 96 MMHG — SIGNIFICANT CHANGE UP (ref 83–108)
PO2 BLDA: 98 MMHG — SIGNIFICANT CHANGE UP (ref 83–108)
PO2 BLDV: 41 MMHG — SIGNIFICANT CHANGE UP
PO2 BLDV: 49 MMHG — SIGNIFICANT CHANGE UP
PO2 BLDV: 57 MMHG — SIGNIFICANT CHANGE UP
POIKILOCYTOSIS BLD QL AUTO: SLIGHT — SIGNIFICANT CHANGE UP
POLYCHROMASIA BLD QL SMEAR: SLIGHT — SIGNIFICANT CHANGE UP
POLYCHROMASIA BLD QL SMEAR: SLIGHT — SIGNIFICANT CHANGE UP
POTASSIUM BLDV-SCNC: 3.8 MMOL/L — SIGNIFICANT CHANGE UP (ref 3.5–5.1)
POTASSIUM SERPL-MCNC: 3.2 MMOL/L — LOW (ref 3.5–5.3)
POTASSIUM SERPL-MCNC: 3.3 MMOL/L — LOW (ref 3.5–5.3)
POTASSIUM SERPL-MCNC: 3.3 MMOL/L — LOW (ref 3.5–5.3)
POTASSIUM SERPL-MCNC: 3.4 MMOL/L — LOW (ref 3.5–5.3)
POTASSIUM SERPL-MCNC: 3.5 MMOL/L — SIGNIFICANT CHANGE UP (ref 3.5–5.3)
POTASSIUM SERPL-MCNC: 3.6 MMOL/L — SIGNIFICANT CHANGE UP (ref 3.5–5.3)
POTASSIUM SERPL-MCNC: 3.6 MMOL/L — SIGNIFICANT CHANGE UP (ref 3.5–5.3)
POTASSIUM SERPL-MCNC: 3.7 MMOL/L — SIGNIFICANT CHANGE UP (ref 3.5–5.3)
POTASSIUM SERPL-MCNC: 3.8 MMOL/L — SIGNIFICANT CHANGE UP (ref 3.5–5.3)
POTASSIUM SERPL-MCNC: 3.9 MMOL/L — SIGNIFICANT CHANGE UP (ref 3.5–5.3)
POTASSIUM SERPL-MCNC: 4 MMOL/L — SIGNIFICANT CHANGE UP (ref 3.5–5.3)
POTASSIUM SERPL-MCNC: 4 MMOL/L — SIGNIFICANT CHANGE UP (ref 3.5–5.3)
POTASSIUM SERPL-MCNC: 4.1 MMOL/L — SIGNIFICANT CHANGE UP (ref 3.5–5.3)
POTASSIUM SERPL-MCNC: 4.1 MMOL/L — SIGNIFICANT CHANGE UP (ref 3.5–5.3)
POTASSIUM SERPL-MCNC: 4.2 MMOL/L — SIGNIFICANT CHANGE UP (ref 3.5–5.3)
POTASSIUM SERPL-MCNC: 4.2 MMOL/L — SIGNIFICANT CHANGE UP (ref 3.5–5.3)
POTASSIUM SERPL-MCNC: 4.3 MMOL/L — SIGNIFICANT CHANGE UP (ref 3.5–5.3)
POTASSIUM SERPL-MCNC: 4.3 MMOL/L — SIGNIFICANT CHANGE UP (ref 3.5–5.3)
POTASSIUM SERPL-MCNC: 4.4 MMOL/L — SIGNIFICANT CHANGE UP (ref 3.5–5.3)
POTASSIUM SERPL-MCNC: 4.5 MMOL/L — SIGNIFICANT CHANGE UP (ref 3.5–5.3)
POTASSIUM SERPL-MCNC: 4.5 MMOL/L — SIGNIFICANT CHANGE UP (ref 3.5–5.3)
POTASSIUM SERPL-MCNC: 5 MMOL/L — SIGNIFICANT CHANGE UP (ref 3.5–5.3)
POTASSIUM SERPL-SCNC: 3.2 MMOL/L — LOW (ref 3.5–5.3)
POTASSIUM SERPL-SCNC: 3.3 MMOL/L — LOW (ref 3.5–5.3)
POTASSIUM SERPL-SCNC: 3.3 MMOL/L — LOW (ref 3.5–5.3)
POTASSIUM SERPL-SCNC: 3.4 MMOL/L — LOW (ref 3.5–5.3)
POTASSIUM SERPL-SCNC: 3.5 MMOL/L — SIGNIFICANT CHANGE UP (ref 3.5–5.3)
POTASSIUM SERPL-SCNC: 3.6 MMOL/L — SIGNIFICANT CHANGE UP (ref 3.5–5.3)
POTASSIUM SERPL-SCNC: 3.6 MMOL/L — SIGNIFICANT CHANGE UP (ref 3.5–5.3)
POTASSIUM SERPL-SCNC: 3.7 MMOL/L — SIGNIFICANT CHANGE UP (ref 3.5–5.3)
POTASSIUM SERPL-SCNC: 3.8 MMOL/L — SIGNIFICANT CHANGE UP (ref 3.5–5.3)
POTASSIUM SERPL-SCNC: 3.9 MMOL/L — SIGNIFICANT CHANGE UP (ref 3.5–5.3)
POTASSIUM SERPL-SCNC: 4 MMOL/L — SIGNIFICANT CHANGE UP (ref 3.5–5.3)
POTASSIUM SERPL-SCNC: 4 MMOL/L — SIGNIFICANT CHANGE UP (ref 3.5–5.3)
POTASSIUM SERPL-SCNC: 4.1 MMOL/L — SIGNIFICANT CHANGE UP (ref 3.5–5.3)
POTASSIUM SERPL-SCNC: 4.1 MMOL/L — SIGNIFICANT CHANGE UP (ref 3.5–5.3)
POTASSIUM SERPL-SCNC: 4.2 MMOL/L — SIGNIFICANT CHANGE UP (ref 3.5–5.3)
POTASSIUM SERPL-SCNC: 4.2 MMOL/L — SIGNIFICANT CHANGE UP (ref 3.5–5.3)
POTASSIUM SERPL-SCNC: 4.3 MMOL/L — SIGNIFICANT CHANGE UP (ref 3.5–5.3)
POTASSIUM SERPL-SCNC: 4.3 MMOL/L — SIGNIFICANT CHANGE UP (ref 3.5–5.3)
POTASSIUM SERPL-SCNC: 4.4 MMOL/L — SIGNIFICANT CHANGE UP (ref 3.5–5.3)
POTASSIUM SERPL-SCNC: 4.5 MMOL/L — SIGNIFICANT CHANGE UP (ref 3.5–5.3)
POTASSIUM SERPL-SCNC: 4.5 MMOL/L — SIGNIFICANT CHANGE UP (ref 3.5–5.3)
POTASSIUM SERPL-SCNC: 5 MMOL/L — SIGNIFICANT CHANGE UP (ref 3.5–5.3)
PROT SERPL-MCNC: 5.8 G/DL — LOW (ref 6–8.3)
PROT SERPL-MCNC: 5.8 G/DL — LOW (ref 6–8.3)
PROT SERPL-MCNC: 6 G/DL — SIGNIFICANT CHANGE UP (ref 6–8.3)
PROT SERPL-MCNC: 6.1 G/DL — SIGNIFICANT CHANGE UP (ref 6–8.3)
PROT SERPL-MCNC: 6.2 G/DL — SIGNIFICANT CHANGE UP (ref 6–8.3)
PROT SERPL-MCNC: 6.3 G/DL — SIGNIFICANT CHANGE UP (ref 6–8.3)
PROT SERPL-MCNC: 6.3 G/DL — SIGNIFICANT CHANGE UP (ref 6–8.3)
PROT SERPL-MCNC: 6.4 G/DL — SIGNIFICANT CHANGE UP (ref 6–8.3)
PROT SERPL-MCNC: 6.5 G/DL — SIGNIFICANT CHANGE UP (ref 6–8.3)
PROT SERPL-MCNC: 6.8 G/DL — SIGNIFICANT CHANGE UP (ref 6–8.3)
PROT SERPL-MCNC: 7 G/DL — SIGNIFICANT CHANGE UP (ref 6–8.3)
PROT SERPL-MCNC: 7.1 G/DL — SIGNIFICANT CHANGE UP (ref 6–8.3)
PROT SERPL-MCNC: 7.1 G/DL — SIGNIFICANT CHANGE UP (ref 6–8.3)
PROT SERPL-MCNC: 7.5 G/DL — SIGNIFICANT CHANGE UP (ref 6–8.3)
PROT UR-MCNC: 100
PROT UR-MCNC: 30 MG/DL
PROTHROM AB SERPL-ACNC: 11.1 SEC — SIGNIFICANT CHANGE UP (ref 10.5–13.4)
PROTHROM AB SERPL-ACNC: 11.8 SEC — SIGNIFICANT CHANGE UP (ref 10.5–13.4)
PTH-INTACT FLD-MCNC: 402 PG/ML — HIGH (ref 15–65)
RAPID RVP RESULT: SIGNIFICANT CHANGE UP
RBC # BLD: 2.27 M/UL — LOW (ref 4.2–5.8)
RBC # BLD: 2.3 M/UL — LOW (ref 4.2–5.8)
RBC # BLD: 2.31 M/UL — LOW (ref 4.2–5.8)
RBC # BLD: 2.32 M/UL — LOW (ref 4.2–5.8)
RBC # BLD: 2.34 M/UL — LOW (ref 4.2–5.8)
RBC # BLD: 2.35 M/UL — LOW (ref 4.2–5.8)
RBC # BLD: 2.39 M/UL — LOW (ref 4.2–5.8)
RBC # BLD: 2.43 M/UL — LOW (ref 4.2–5.8)
RBC # BLD: 2.46 M/UL — LOW (ref 4.2–5.8)
RBC # BLD: 2.47 M/UL — LOW (ref 4.2–5.8)
RBC # BLD: 2.47 M/UL — LOW (ref 4.2–5.8)
RBC # BLD: 2.51 M/UL — LOW (ref 4.2–5.8)
RBC # BLD: 2.53 M/UL — LOW (ref 4.2–5.8)
RBC # BLD: 2.53 M/UL — LOW (ref 4.2–5.8)
RBC # BLD: 2.54 M/UL — LOW (ref 4.2–5.8)
RBC # BLD: 2.56 M/UL — LOW (ref 4.2–5.8)
RBC # BLD: 2.59 M/UL — LOW (ref 4.2–5.8)
RBC # BLD: 2.62 M/UL — LOW (ref 4.2–5.8)
RBC # BLD: 2.64 M/UL — LOW (ref 4.2–5.8)
RBC # BLD: 2.68 M/UL — LOW (ref 4.2–5.8)
RBC # BLD: 2.68 M/UL — LOW (ref 4.2–5.8)
RBC # BLD: 2.7 M/UL — LOW (ref 4.2–5.8)
RBC # BLD: 2.71 M/UL — LOW (ref 4.2–5.8)
RBC # BLD: 2.72 M/UL — LOW (ref 4.2–5.8)
RBC # BLD: 2.75 M/UL — LOW (ref 4.2–5.8)
RBC # BLD: 2.81 M/UL — LOW (ref 4.2–5.8)
RBC # BLD: 3.04 M/UL — LOW (ref 4.2–5.8)
RBC # BLD: 3.07 M/UL — LOW (ref 4.2–5.8)
RBC # FLD: 13.5 % — SIGNIFICANT CHANGE UP (ref 10.3–14.5)
RBC # FLD: 13.6 % — SIGNIFICANT CHANGE UP (ref 10.3–14.5)
RBC # FLD: 13.7 % — SIGNIFICANT CHANGE UP (ref 10.3–14.5)
RBC # FLD: 13.8 % — SIGNIFICANT CHANGE UP (ref 10.3–14.5)
RBC # FLD: 13.9 % — SIGNIFICANT CHANGE UP (ref 10.3–14.5)
RBC # FLD: 14 % — SIGNIFICANT CHANGE UP (ref 10.3–14.5)
RBC # FLD: 14.6 % — HIGH (ref 10.3–14.5)
RBC # FLD: 14.6 % — HIGH (ref 10.3–14.5)
RBC # FLD: 14.7 % — HIGH (ref 10.3–14.5)
RBC # FLD: 14.8 % — HIGH (ref 10.3–14.5)
RBC # FLD: 14.9 % — HIGH (ref 10.3–14.5)
RBC # FLD: 15 % — HIGH (ref 10.3–14.5)
RBC # FLD: 15.4 % — HIGH (ref 10.3–14.5)
RBC # FLD: 15.4 % — HIGH (ref 10.3–14.5)
RBC # FLD: 15.5 % — HIGH (ref 10.3–14.5)
RBC # FLD: 15.6 % — HIGH (ref 10.3–14.5)
RBC # FLD: 15.6 % — HIGH (ref 10.3–14.5)
RBC # FLD: 15.8 % — HIGH (ref 10.3–14.5)
RBC # FLD: 15.9 % — HIGH (ref 10.3–14.5)
RBC # FLD: 16 % — HIGH (ref 10.3–14.5)
RBC # FLD: 16.1 % — HIGH (ref 10.3–14.5)
RBC # FLD: 16.1 % — HIGH (ref 10.3–14.5)
RBC # FLD: 16.7 % — HIGH (ref 10.3–14.5)
RBC # FLD: 16.8 % — HIGH (ref 10.3–14.5)
RBC BLD AUTO: ABNORMAL
RBC BLD AUTO: ABNORMAL
RBC BLD AUTO: NORMAL — SIGNIFICANT CHANGE UP
RBC CASTS # UR COMP ASSIST: ABNORMAL /HPF (ref 0–2)
RBC CASTS # UR COMP ASSIST: ABNORMAL /HPF (ref 0–2)
S AUREUS DNA NOSE QL NAA+PROBE: DETECTED
S AUREUS DNA NOSE QL NAA+PROBE: DETECTED
SALICYLATES SERPL-MCNC: 1.9 MG/DL — LOW (ref 2.8–20)
SAO2 % BLDA: 96 % — SIGNIFICANT CHANGE UP
SAO2 % BLDA: 97 % — SIGNIFICANT CHANGE UP
SAO2 % BLDA: 98 % — SIGNIFICANT CHANGE UP
SAO2 % BLDA: 99 % — SIGNIFICANT CHANGE UP
SAO2 % BLDV: 67.2 % — SIGNIFICANT CHANGE UP
SAO2 % BLDV: 84 % — SIGNIFICANT CHANGE UP
SAO2 % BLDV: SIGNIFICANT CHANGE UP %
SARS-COV-2 RNA SPEC QL NAA+PROBE: SIGNIFICANT CHANGE UP
SODIUM SERPL-SCNC: 136 MMOL/L — SIGNIFICANT CHANGE UP (ref 135–145)
SODIUM SERPL-SCNC: 136 MMOL/L — SIGNIFICANT CHANGE UP (ref 135–145)
SODIUM SERPL-SCNC: 138 MMOL/L — SIGNIFICANT CHANGE UP (ref 135–145)
SODIUM SERPL-SCNC: 139 MMOL/L — SIGNIFICANT CHANGE UP (ref 135–145)
SODIUM SERPL-SCNC: 141 MMOL/L — SIGNIFICANT CHANGE UP (ref 135–145)
SODIUM SERPL-SCNC: 142 MMOL/L — SIGNIFICANT CHANGE UP (ref 135–145)
SODIUM SERPL-SCNC: 142 MMOL/L — SIGNIFICANT CHANGE UP (ref 135–145)
SODIUM SERPL-SCNC: 143 MMOL/L — SIGNIFICANT CHANGE UP (ref 135–145)
SODIUM SERPL-SCNC: 144 MMOL/L — SIGNIFICANT CHANGE UP (ref 135–145)
SODIUM SERPL-SCNC: 144 MMOL/L — SIGNIFICANT CHANGE UP (ref 135–145)
SODIUM SERPL-SCNC: 146 MMOL/L — HIGH (ref 135–145)
SODIUM SERPL-SCNC: 146 MMOL/L — HIGH (ref 135–145)
SODIUM SERPL-SCNC: 147 MMOL/L — HIGH (ref 135–145)
SODIUM SERPL-SCNC: 149 MMOL/L — HIGH (ref 135–145)
SODIUM SERPL-SCNC: 150 MMOL/L — HIGH (ref 135–145)
SODIUM SERPL-SCNC: 151 MMOL/L — HIGH (ref 135–145)
SODIUM SERPL-SCNC: 151 MMOL/L — HIGH (ref 135–145)
SODIUM SERPL-SCNC: 153 MMOL/L — HIGH (ref 135–145)
SODIUM SERPL-SCNC: 153 MMOL/L — HIGH (ref 135–145)
SODIUM SERPL-SCNC: 154 MMOL/L — HIGH (ref 135–145)
SODIUM SERPL-SCNC: 154 MMOL/L — HIGH (ref 135–145)
SP GR SPEC: 1 — LOW (ref 1.01–1.02)
SP GR SPEC: 1.02 — SIGNIFICANT CHANGE UP (ref 1.01–1.02)
SPECIMEN SOURCE: SIGNIFICANT CHANGE UP
SPHEROCYTES BLD QL SMEAR: SLIGHT — SIGNIFICANT CHANGE UP
THC UR QL: NEGATIVE — SIGNIFICANT CHANGE UP
TIBC SERPL-MCNC: 166 UG/DL — LOW (ref 250–450)
TIBC SERPL-MCNC: 204 UG/DL — LOW (ref 250–450)
TROPONIN I, HIGH SENSITIVITY RESULT: 18.8 NG/L — SIGNIFICANT CHANGE UP
TROPONIN I, HIGH SENSITIVITY RESULT: 284.8 NG/L — HIGH
TROPONIN I, HIGH SENSITIVITY RESULT: 353.3 NG/L — HIGH
TROPONIN I, HIGH SENSITIVITY RESULT: 9.9 NG/L — SIGNIFICANT CHANGE UP
TSH SERPL-MCNC: 1.77 UU/ML — SIGNIFICANT CHANGE UP (ref 0.34–4.82)
UIBC SERPL-MCNC: 105 UG/DL — LOW (ref 110–370)
UIBC SERPL-MCNC: 137 UG/DL — SIGNIFICANT CHANGE UP (ref 110–370)
UROBILINOGEN FLD QL: NEGATIVE — SIGNIFICANT CHANGE UP
UROBILINOGEN FLD QL: NEGATIVE — SIGNIFICANT CHANGE UP
VALPROATE FREE SERPL-MCNC: 11.4 MG/L — SIGNIFICANT CHANGE UP (ref 4.8–17.3)
VALPROATE SERPL-MCNC: 24 UG/ML — LOW (ref 50–100)
VALPROATE SERPL-MCNC: 31 UG/ML — LOW (ref 50–100)
VALPROATE SERPL-MCNC: 41 UG/ML — LOW (ref 50–100)
VALPROATE SERPL-MCNC: 54 UG/ML — SIGNIFICANT CHANGE UP (ref 50–100)
VARIANT LYMPHS # BLD: 1 % — SIGNIFICANT CHANGE UP (ref 0–6)
WBC # BLD: 10.09 K/UL — SIGNIFICANT CHANGE UP (ref 3.8–10.5)
WBC # BLD: 10.64 K/UL — HIGH (ref 3.8–10.5)
WBC # BLD: 10.73 K/UL — HIGH (ref 3.8–10.5)
WBC # BLD: 11.51 K/UL — HIGH (ref 3.8–10.5)
WBC # BLD: 11.65 K/UL — HIGH (ref 3.8–10.5)
WBC # BLD: 11.69 K/UL — HIGH (ref 3.8–10.5)
WBC # BLD: 11.9 K/UL — HIGH (ref 3.8–10.5)
WBC # BLD: 11.91 K/UL — HIGH (ref 3.8–10.5)
WBC # BLD: 12.48 K/UL — HIGH (ref 3.8–10.5)
WBC # BLD: 12.65 K/UL — HIGH (ref 3.8–10.5)
WBC # BLD: 12.76 K/UL — HIGH (ref 3.8–10.5)
WBC # BLD: 12.76 K/UL — HIGH (ref 3.8–10.5)
WBC # BLD: 14.04 K/UL — HIGH (ref 3.8–10.5)
WBC # BLD: 14.25 K/UL — HIGH (ref 3.8–10.5)
WBC # BLD: 15.84 K/UL — HIGH (ref 3.8–10.5)
WBC # BLD: 7.27 K/UL — SIGNIFICANT CHANGE UP (ref 3.8–10.5)
WBC # BLD: 7.61 K/UL — SIGNIFICANT CHANGE UP (ref 3.8–10.5)
WBC # BLD: 7.68 K/UL — SIGNIFICANT CHANGE UP (ref 3.8–10.5)
WBC # BLD: 7.68 K/UL — SIGNIFICANT CHANGE UP (ref 3.8–10.5)
WBC # BLD: 7.99 K/UL — SIGNIFICANT CHANGE UP (ref 3.8–10.5)
WBC # BLD: 8.38 K/UL — SIGNIFICANT CHANGE UP (ref 3.8–10.5)
WBC # BLD: 9.08 K/UL — SIGNIFICANT CHANGE UP (ref 3.8–10.5)
WBC # BLD: 9.12 K/UL — SIGNIFICANT CHANGE UP (ref 3.8–10.5)
WBC # BLD: 9.52 K/UL — SIGNIFICANT CHANGE UP (ref 3.8–10.5)
WBC # BLD: 9.61 K/UL — SIGNIFICANT CHANGE UP (ref 3.8–10.5)
WBC # BLD: 9.8 K/UL — SIGNIFICANT CHANGE UP (ref 3.8–10.5)
WBC # BLD: 9.86 K/UL — SIGNIFICANT CHANGE UP (ref 3.8–10.5)
WBC # BLD: 9.97 K/UL — SIGNIFICANT CHANGE UP (ref 3.8–10.5)
WBC # FLD AUTO: 10.09 K/UL — SIGNIFICANT CHANGE UP (ref 3.8–10.5)
WBC # FLD AUTO: 10.64 K/UL — HIGH (ref 3.8–10.5)
WBC # FLD AUTO: 10.73 K/UL — HIGH (ref 3.8–10.5)
WBC # FLD AUTO: 11.51 K/UL — HIGH (ref 3.8–10.5)
WBC # FLD AUTO: 11.65 K/UL — HIGH (ref 3.8–10.5)
WBC # FLD AUTO: 11.69 K/UL — HIGH (ref 3.8–10.5)
WBC # FLD AUTO: 11.9 K/UL — HIGH (ref 3.8–10.5)
WBC # FLD AUTO: 11.91 K/UL — HIGH (ref 3.8–10.5)
WBC # FLD AUTO: 12.48 K/UL — HIGH (ref 3.8–10.5)
WBC # FLD AUTO: 12.65 K/UL — HIGH (ref 3.8–10.5)
WBC # FLD AUTO: 12.76 K/UL — HIGH (ref 3.8–10.5)
WBC # FLD AUTO: 12.76 K/UL — HIGH (ref 3.8–10.5)
WBC # FLD AUTO: 14.04 K/UL — HIGH (ref 3.8–10.5)
WBC # FLD AUTO: 14.25 K/UL — HIGH (ref 3.8–10.5)
WBC # FLD AUTO: 15.84 K/UL — HIGH (ref 3.8–10.5)
WBC # FLD AUTO: 7.27 K/UL — SIGNIFICANT CHANGE UP (ref 3.8–10.5)
WBC # FLD AUTO: 7.61 K/UL — SIGNIFICANT CHANGE UP (ref 3.8–10.5)
WBC # FLD AUTO: 7.68 K/UL — SIGNIFICANT CHANGE UP (ref 3.8–10.5)
WBC # FLD AUTO: 7.68 K/UL — SIGNIFICANT CHANGE UP (ref 3.8–10.5)
WBC # FLD AUTO: 7.99 K/UL — SIGNIFICANT CHANGE UP (ref 3.8–10.5)
WBC # FLD AUTO: 8.38 K/UL — SIGNIFICANT CHANGE UP (ref 3.8–10.5)
WBC # FLD AUTO: 9.08 K/UL — SIGNIFICANT CHANGE UP (ref 3.8–10.5)
WBC # FLD AUTO: 9.12 K/UL — SIGNIFICANT CHANGE UP (ref 3.8–10.5)
WBC # FLD AUTO: 9.52 K/UL — SIGNIFICANT CHANGE UP (ref 3.8–10.5)
WBC # FLD AUTO: 9.61 K/UL — SIGNIFICANT CHANGE UP (ref 3.8–10.5)
WBC # FLD AUTO: 9.8 K/UL — SIGNIFICANT CHANGE UP (ref 3.8–10.5)
WBC # FLD AUTO: 9.86 K/UL — SIGNIFICANT CHANGE UP (ref 3.8–10.5)
WBC # FLD AUTO: 9.97 K/UL — SIGNIFICANT CHANGE UP (ref 3.8–10.5)
WBC UR QL: ABNORMAL /HPF (ref 0–5)
WBC UR QL: SIGNIFICANT CHANGE UP /HPF (ref 0–5)

## 2022-01-01 PROCEDURE — 74176 CT ABD & PELVIS W/O CONTRAST: CPT | Mod: 26

## 2022-01-01 PROCEDURE — 82962 GLUCOSE BLOOD TEST: CPT

## 2022-01-01 PROCEDURE — 93306 TTE W/DOPPLER COMPLETE: CPT

## 2022-01-01 PROCEDURE — 99291 CRITICAL CARE FIRST HOUR: CPT

## 2022-01-01 PROCEDURE — 87635 SARS-COV-2 COVID-19 AMP PRB: CPT

## 2022-01-01 PROCEDURE — 84100 ASSAY OF PHOSPHORUS: CPT

## 2022-01-01 PROCEDURE — 82803 BLOOD GASES ANY COMBINATION: CPT

## 2022-01-01 PROCEDURE — 85730 THROMBOPLASTIN TIME PARTIAL: CPT

## 2022-01-01 PROCEDURE — 87637 SARSCOV2&INF A&B&RSV AMP PRB: CPT

## 2022-01-01 PROCEDURE — 87640 STAPH A DNA AMP PROBE: CPT

## 2022-01-01 PROCEDURE — 95816 EEG AWAKE AND DROWSY: CPT | Mod: 26

## 2022-01-01 PROCEDURE — 86901 BLOOD TYPING SEROLOGIC RH(D): CPT

## 2022-01-01 PROCEDURE — 94003 VENT MGMT INPAT SUBQ DAY: CPT

## 2022-01-01 PROCEDURE — 87040 BLOOD CULTURE FOR BACTERIA: CPT

## 2022-01-01 PROCEDURE — 87641 MR-STAPH DNA AMP PROBE: CPT

## 2022-01-01 PROCEDURE — 84443 ASSAY THYROID STIM HORMONE: CPT

## 2022-01-01 PROCEDURE — 93010 ELECTROCARDIOGRAM REPORT: CPT

## 2022-01-01 PROCEDURE — 82550 ASSAY OF CK (CPK): CPT

## 2022-01-01 PROCEDURE — 85027 COMPLETE CBC AUTOMATED: CPT

## 2022-01-01 PROCEDURE — 83935 ASSAY OF URINE OSMOLALITY: CPT

## 2022-01-01 PROCEDURE — 99497 ADVNCD CARE PLAN 30 MIN: CPT

## 2022-01-01 PROCEDURE — 84132 ASSAY OF SERUM POTASSIUM: CPT

## 2022-01-01 PROCEDURE — 82310 ASSAY OF CALCIUM: CPT

## 2022-01-01 PROCEDURE — 84295 ASSAY OF SERUM SODIUM: CPT

## 2022-01-01 PROCEDURE — 80053 COMPREHEN METABOLIC PANEL: CPT

## 2022-01-01 PROCEDURE — 93306 TTE W/DOPPLER COMPLETE: CPT | Mod: 26

## 2022-01-01 PROCEDURE — 80307 DRUG TEST PRSMV CHEM ANLYZR: CPT

## 2022-01-01 PROCEDURE — 71045 X-RAY EXAM CHEST 1 VIEW: CPT | Mod: 26,77

## 2022-01-01 PROCEDURE — 80165 DIPROPYLACETIC ACID FREE: CPT

## 2022-01-01 PROCEDURE — 87070 CULTURE OTHR SPECIMN AEROBIC: CPT

## 2022-01-01 PROCEDURE — 95819 EEG AWAKE AND ASLEEP: CPT

## 2022-01-01 PROCEDURE — P9040: CPT

## 2022-01-01 PROCEDURE — 81001 URINALYSIS AUTO W/SCOPE: CPT

## 2022-01-01 PROCEDURE — 94640 AIRWAY INHALATION TREATMENT: CPT

## 2022-01-01 PROCEDURE — 99285 EMERGENCY DEPT VISIT HI MDM: CPT

## 2022-01-01 PROCEDURE — 86900 BLOOD TYPING SEROLOGIC ABO: CPT

## 2022-01-01 PROCEDURE — 95720 EEG PHY/QHP EA INCR W/VEEG: CPT

## 2022-01-01 PROCEDURE — 99222 1ST HOSP IP/OBS MODERATE 55: CPT

## 2022-01-01 PROCEDURE — 82330 ASSAY OF CALCIUM: CPT

## 2022-01-01 PROCEDURE — 36620 INSERTION CATHETER ARTERY: CPT | Mod: GC

## 2022-01-01 PROCEDURE — 71045 X-RAY EXAM CHEST 1 VIEW: CPT | Mod: 26,77,76

## 2022-01-01 PROCEDURE — 71045 X-RAY EXAM CHEST 1 VIEW: CPT | Mod: 26

## 2022-01-01 PROCEDURE — 36430 TRANSFUSION BLD/BLD COMPNT: CPT

## 2022-01-01 PROCEDURE — 86850 RBC ANTIBODY SCREEN: CPT

## 2022-01-01 PROCEDURE — 92526 ORAL FUNCTION THERAPY: CPT

## 2022-01-01 PROCEDURE — 83540 ASSAY OF IRON: CPT

## 2022-01-01 PROCEDURE — 71045 X-RAY EXAM CHEST 1 VIEW: CPT

## 2022-01-01 PROCEDURE — 83605 ASSAY OF LACTIC ACID: CPT

## 2022-01-01 PROCEDURE — 93970 EXTREMITY STUDY: CPT | Mod: 26

## 2022-01-01 PROCEDURE — 70450 CT HEAD/BRAIN W/O DYE: CPT | Mod: 26

## 2022-01-01 PROCEDURE — 85025 COMPLETE CBC W/AUTO DIFF WBC: CPT

## 2022-01-01 PROCEDURE — 71250 CT THORAX DX C-: CPT

## 2022-01-01 PROCEDURE — 96375 TX/PRO/DX INJ NEW DRUG ADDON: CPT

## 2022-01-01 PROCEDURE — 95957 EEG DIGITAL ANALYSIS: CPT

## 2022-01-01 PROCEDURE — 80177 DRUG SCRN QUAN LEVETIRACETAM: CPT

## 2022-01-01 PROCEDURE — 80048 BASIC METABOLIC PNL TOTAL CA: CPT

## 2022-01-01 PROCEDURE — 36556 INSERT NON-TUNNEL CV CATH: CPT | Mod: GC

## 2022-01-01 PROCEDURE — 80074 ACUTE HEPATITIS PANEL: CPT

## 2022-01-01 PROCEDURE — 86923 COMPATIBILITY TEST ELECTRIC: CPT

## 2022-01-01 PROCEDURE — 82553 CREATINE MB FRACTION: CPT

## 2022-01-01 PROCEDURE — 80164 ASSAY DIPROPYLACETIC ACD TOT: CPT

## 2022-01-01 PROCEDURE — 83735 ASSAY OF MAGNESIUM: CPT

## 2022-01-01 PROCEDURE — 96361 HYDRATE IV INFUSION ADD-ON: CPT

## 2022-01-01 PROCEDURE — 93005 ELECTROCARDIOGRAM TRACING: CPT

## 2022-01-01 PROCEDURE — 99291 CRITICAL CARE FIRST HOUR: CPT | Mod: GC,25

## 2022-01-01 PROCEDURE — 87449 NOS EACH ORGANISM AG IA: CPT

## 2022-01-01 PROCEDURE — 36415 COLL VENOUS BLD VENIPUNCTURE: CPT

## 2022-01-01 PROCEDURE — 94002 VENT MGMT INPAT INIT DAY: CPT

## 2022-01-01 PROCEDURE — 94660 CPAP INITIATION&MGMT: CPT

## 2022-01-01 PROCEDURE — 96360 HYDRATION IV INFUSION INIT: CPT

## 2022-01-01 PROCEDURE — 85379 FIBRIN DEGRADATION QUANT: CPT

## 2022-01-01 PROCEDURE — 70450 CT HEAD/BRAIN W/O DYE: CPT

## 2022-01-01 PROCEDURE — 85610 PROTHROMBIN TIME: CPT

## 2022-01-01 PROCEDURE — 94760 N-INVAS EAR/PLS OXIMETRY 1: CPT

## 2022-01-01 PROCEDURE — 87086 URINE CULTURE/COLONY COUNT: CPT

## 2022-01-01 PROCEDURE — 71250 CT THORAX DX C-: CPT | Mod: 26

## 2022-01-01 PROCEDURE — 82140 ASSAY OF AMMONIA: CPT

## 2022-01-01 PROCEDURE — 92610 EVALUATE SWALLOWING FUNCTION: CPT

## 2022-01-01 PROCEDURE — 82728 ASSAY OF FERRITIN: CPT

## 2022-01-01 PROCEDURE — 95707 EEG W/O VID 2-12HR CONT MNTR: CPT

## 2022-01-01 PROCEDURE — 99261: CPT

## 2022-01-01 PROCEDURE — 93970 EXTREMITY STUDY: CPT

## 2022-01-01 PROCEDURE — 74176 CT ABD & PELVIS W/O CONTRAST: CPT

## 2022-01-01 PROCEDURE — 96374 THER/PROPH/DIAG INJ IV PUSH: CPT

## 2022-01-01 PROCEDURE — 82272 OCCULT BLD FECES 1-3 TESTS: CPT

## 2022-01-01 PROCEDURE — 71045 X-RAY EXAM CHEST 1 VIEW: CPT | Mod: 26,76

## 2022-01-01 PROCEDURE — 87340 HEPATITIS B SURFACE AG IA: CPT

## 2022-01-01 PROCEDURE — 84484 ASSAY OF TROPONIN QUANT: CPT

## 2022-01-01 PROCEDURE — 83880 ASSAY OF NATRIURETIC PEPTIDE: CPT

## 2022-01-01 PROCEDURE — 31500 INSERT EMERGENCY AIRWAY: CPT | Mod: GC

## 2022-01-01 PROCEDURE — 0225U NFCT DS DNA&RNA 21 SARSCOV2: CPT

## 2022-01-01 PROCEDURE — 70450 CT HEAD/BRAIN W/O DYE: CPT | Mod: MA

## 2022-01-01 PROCEDURE — 83550 IRON BINDING TEST: CPT

## 2022-01-01 PROCEDURE — 83970 ASSAY OF PARATHORMONE: CPT

## 2022-01-01 RX ORDER — MIDAZOLAM HYDROCHLORIDE 1 MG/ML
2 INJECTION, SOLUTION INTRAMUSCULAR; INTRAVENOUS ONCE
Refills: 0 | Status: DISCONTINUED | OUTPATIENT
Start: 2022-01-01 | End: 2022-01-01

## 2022-01-01 RX ORDER — PROPOFOL 10 MG/ML
10 INJECTION, EMULSION INTRAVENOUS
Qty: 1000 | Refills: 0 | Status: DISCONTINUED | OUTPATIENT
Start: 2022-01-01 | End: 2022-01-01

## 2022-01-01 RX ORDER — ACETAMINOPHEN 500 MG
650 TABLET ORAL EVERY 6 HOURS
Refills: 0 | Status: DISCONTINUED | OUTPATIENT
Start: 2022-01-01 | End: 2022-01-01

## 2022-01-01 RX ORDER — FERROUS SULFATE 325(65) MG
325 TABLET ORAL DAILY
Refills: 0 | Status: DISCONTINUED | OUTPATIENT
Start: 2022-01-01 | End: 2022-01-01

## 2022-01-01 RX ORDER — ACETAMINOPHEN 500 MG
975 TABLET ORAL EVERY 6 HOURS
Refills: 0 | Status: DISCONTINUED | OUTPATIENT
Start: 2022-01-01 | End: 2022-01-01

## 2022-01-01 RX ORDER — PANTOPRAZOLE SODIUM 20 MG/1
40 TABLET, DELAYED RELEASE ORAL
Refills: 0 | Status: DISCONTINUED | OUTPATIENT
Start: 2022-01-01 | End: 2022-01-01

## 2022-01-01 RX ORDER — LEVOTHYROXINE SODIUM 125 MCG
75 TABLET ORAL AT BEDTIME
Refills: 0 | Status: DISCONTINUED | OUTPATIENT
Start: 2022-01-01 | End: 2022-01-01

## 2022-01-01 RX ORDER — RISPERIDONE 4 MG/1
2 TABLET ORAL DAILY
Refills: 0 | Status: DISCONTINUED | OUTPATIENT
Start: 2022-01-01 | End: 2022-01-01

## 2022-01-01 RX ORDER — FINASTERIDE 5 MG/1
5 TABLET, FILM COATED ORAL DAILY
Refills: 0 | Status: DISCONTINUED | OUTPATIENT
Start: 2022-01-01 | End: 2022-01-01

## 2022-01-01 RX ORDER — FOLIC ACID 0.8 MG
1 TABLET ORAL DAILY
Refills: 0 | Status: DISCONTINUED | OUTPATIENT
Start: 2022-01-01 | End: 2022-01-01

## 2022-01-01 RX ORDER — SODIUM CHLORIDE 9 MG/ML
1000 INJECTION, SOLUTION INTRAVENOUS
Refills: 0 | Status: DISCONTINUED | OUTPATIENT
Start: 2022-01-01 | End: 2022-01-01

## 2022-01-01 RX ORDER — VALPROIC ACID (AS SODIUM SALT) 250 MG/5ML
650 SOLUTION, ORAL ORAL EVERY 6 HOURS
Refills: 0 | Status: DISCONTINUED | OUTPATIENT
Start: 2022-01-01 | End: 2022-01-01

## 2022-01-01 RX ORDER — VALPROIC ACID (AS SODIUM SALT) 250 MG/5ML
750 SOLUTION, ORAL ORAL EVERY 6 HOURS
Refills: 0 | Status: DISCONTINUED | OUTPATIENT
Start: 2022-01-01 | End: 2022-01-01

## 2022-01-01 RX ORDER — SEVELAMER CARBONATE 2400 MG/1
800 POWDER, FOR SUSPENSION ORAL EVERY 8 HOURS
Refills: 0 | Status: DISCONTINUED | OUTPATIENT
Start: 2022-01-01 | End: 2022-01-01

## 2022-01-01 RX ORDER — TAMSULOSIN HYDROCHLORIDE 0.4 MG/1
0.4 CAPSULE ORAL AT BEDTIME
Refills: 0 | Status: DISCONTINUED | OUTPATIENT
Start: 2022-01-01 | End: 2022-01-01

## 2022-01-01 RX ORDER — SODIUM BICARBONATE 1 MEQ/ML
325 SYRINGE (ML) INTRAVENOUS EVERY 6 HOURS
Refills: 0 | Status: DISCONTINUED | OUTPATIENT
Start: 2022-01-01 | End: 2022-01-01

## 2022-01-01 RX ORDER — CHLORHEXIDINE GLUCONATE 213 G/1000ML
2 SOLUTION TOPICAL
Refills: 0 | Status: DISCONTINUED | OUTPATIENT
Start: 2022-01-01 | End: 2022-01-01

## 2022-01-01 RX ORDER — SENNA PLUS 8.6 MG/1
10 TABLET ORAL AT BEDTIME
Refills: 0 | Status: DISCONTINUED | OUTPATIENT
Start: 2022-01-01 | End: 2022-01-01

## 2022-01-01 RX ORDER — RISPERIDONE 4 MG/1
2 TABLET ORAL AT BEDTIME
Refills: 0 | Status: DISCONTINUED | OUTPATIENT
Start: 2022-01-01 | End: 2022-01-01

## 2022-01-01 RX ORDER — NOREPINEPHRINE BITARTRATE/D5W 8 MG/250ML
0.4 PLASTIC BAG, INJECTION (ML) INTRAVENOUS
Qty: 8 | Refills: 0 | Status: DISCONTINUED | OUTPATIENT
Start: 2022-01-01 | End: 2022-01-01

## 2022-01-01 RX ORDER — CEFTRIAXONE 500 MG/1
2000 INJECTION, POWDER, FOR SOLUTION INTRAMUSCULAR; INTRAVENOUS EVERY 24 HOURS
Refills: 0 | Status: DISCONTINUED | OUTPATIENT
Start: 2022-01-01 | End: 2022-01-01

## 2022-01-01 RX ORDER — SEVELAMER CARBONATE 2400 MG/1
800 POWDER, FOR SUSPENSION ORAL THREE TIMES A DAY
Refills: 0 | Status: DISCONTINUED | OUTPATIENT
Start: 2022-01-01 | End: 2022-01-01

## 2022-01-01 RX ORDER — DOXAZOSIN MESYLATE 4 MG
1 TABLET ORAL AT BEDTIME
Refills: 0 | Status: DISCONTINUED | OUTPATIENT
Start: 2022-01-01 | End: 2022-01-01

## 2022-01-01 RX ORDER — BUPROPION HYDROCHLORIDE 150 MG/1
150 TABLET, EXTENDED RELEASE ORAL DAILY
Refills: 0 | Status: DISCONTINUED | OUTPATIENT
Start: 2022-01-01 | End: 2022-01-01

## 2022-01-01 RX ORDER — SODIUM CHLORIDE 9 MG/ML
1000 INJECTION, SOLUTION INTRAVENOUS
Refills: 0 | Status: COMPLETED | OUTPATIENT
Start: 2022-01-01 | End: 2022-01-01

## 2022-01-01 RX ORDER — MIDAZOLAM HYDROCHLORIDE 1 MG/ML
1 INJECTION, SOLUTION INTRAMUSCULAR; INTRAVENOUS ONCE
Refills: 0 | Status: DISCONTINUED | OUTPATIENT
Start: 2022-01-01 | End: 2022-01-01

## 2022-01-01 RX ORDER — HEPARIN SODIUM 5000 [USP'U]/ML
5000 INJECTION INTRAVENOUS; SUBCUTANEOUS EVERY 8 HOURS
Refills: 0 | Status: DISCONTINUED | OUTPATIENT
Start: 2022-01-01 | End: 2022-01-01

## 2022-01-01 RX ORDER — POTASSIUM CHLORIDE 20 MEQ
20 PACKET (EA) ORAL ONCE
Refills: 0 | Status: DISCONTINUED | OUTPATIENT
Start: 2022-01-01 | End: 2022-01-01

## 2022-01-01 RX ORDER — LEVOTHYROXINE SODIUM 125 MCG
125 TABLET ORAL DAILY
Refills: 0 | Status: DISCONTINUED | OUTPATIENT
Start: 2022-01-01 | End: 2022-01-01

## 2022-01-01 RX ORDER — VALPROIC ACID (AS SODIUM SALT) 250 MG/5ML
500 SOLUTION, ORAL ORAL EVERY 8 HOURS
Refills: 0 | Status: DISCONTINUED | OUTPATIENT
Start: 2022-01-01 | End: 2022-01-01

## 2022-01-01 RX ORDER — SIMVASTATIN 20 MG/1
20 TABLET, FILM COATED ORAL AT BEDTIME
Refills: 0 | Status: DISCONTINUED | OUTPATIENT
Start: 2022-01-01 | End: 2022-01-01

## 2022-01-01 RX ORDER — MUPIROCIN 20 MG/G
1 OINTMENT TOPICAL
Refills: 0 | Status: COMPLETED | OUTPATIENT
Start: 2022-01-01 | End: 2022-01-01

## 2022-01-01 RX ORDER — LEVOTHYROXINE SODIUM 125 MCG
100 TABLET ORAL AT BEDTIME
Refills: 0 | Status: DISCONTINUED | OUTPATIENT
Start: 2022-01-01 | End: 2022-01-01

## 2022-01-01 RX ORDER — NOREPINEPHRINE BITARTRATE/D5W 8 MG/250ML
0.05 PLASTIC BAG, INJECTION (ML) INTRAVENOUS
Qty: 16 | Refills: 0 | Status: DISCONTINUED | OUTPATIENT
Start: 2022-01-01 | End: 2022-01-01

## 2022-01-01 RX ORDER — ALBUTEROL 90 UG/1
2 AEROSOL, METERED ORAL EVERY 6 HOURS
Refills: 0 | Status: COMPLETED | OUTPATIENT
Start: 2022-01-01 | End: 2023-10-30

## 2022-01-01 RX ORDER — VALPROIC ACID (AS SODIUM SALT) 250 MG/5ML
250 SOLUTION, ORAL ORAL EVERY 6 HOURS
Refills: 0 | Status: DISCONTINUED | OUTPATIENT
Start: 2022-01-01 | End: 2022-01-01

## 2022-01-01 RX ORDER — DEXMEDETOMIDINE HYDROCHLORIDE IN 0.9% SODIUM CHLORIDE 4 UG/ML
0.2 INJECTION INTRAVENOUS
Qty: 200 | Refills: 0 | Status: DISCONTINUED | OUTPATIENT
Start: 2022-01-01 | End: 2022-01-01

## 2022-01-01 RX ORDER — POLYETHYLENE GLYCOL 3350 17 G/17G
17 POWDER, FOR SOLUTION ORAL AT BEDTIME
Refills: 0 | Status: DISCONTINUED | OUTPATIENT
Start: 2022-01-01 | End: 2022-01-01

## 2022-01-01 RX ORDER — POTASSIUM CHLORIDE 20 MEQ
40 PACKET (EA) ORAL ONCE
Refills: 0 | Status: COMPLETED | OUTPATIENT
Start: 2022-01-01 | End: 2022-01-01

## 2022-01-01 RX ORDER — NOREPINEPHRINE BITARTRATE/D5W 8 MG/250ML
0.1 PLASTIC BAG, INJECTION (ML) INTRAVENOUS
Qty: 8 | Refills: 0 | Status: DISCONTINUED | OUTPATIENT
Start: 2022-01-01 | End: 2022-01-01

## 2022-01-01 RX ORDER — SEVELAMER CARBONATE 2400 MG/1
800 POWDER, FOR SUSPENSION ORAL
Refills: 0 | Status: DISCONTINUED | OUTPATIENT
Start: 2022-01-01 | End: 2022-01-01

## 2022-01-01 RX ORDER — DOPAMINE HYDROCHLORIDE 40 MG/ML
5 INJECTION, SOLUTION, CONCENTRATE INTRAVENOUS
Qty: 800 | Refills: 0 | Status: DISCONTINUED | OUTPATIENT
Start: 2022-01-01 | End: 2022-01-01

## 2022-01-01 RX ORDER — PANTOPRAZOLE SODIUM 20 MG/1
40 TABLET, DELAYED RELEASE ORAL DAILY
Refills: 0 | Status: DISCONTINUED | OUTPATIENT
Start: 2022-01-01 | End: 2022-01-01

## 2022-01-01 RX ORDER — FERROUS SULFATE 325(65) MG
300 TABLET ORAL DAILY
Refills: 0 | Status: DISCONTINUED | OUTPATIENT
Start: 2022-01-01 | End: 2022-01-01

## 2022-01-01 RX ORDER — BUPROPION HYDROCHLORIDE 150 MG/1
75 TABLET, EXTENDED RELEASE ORAL
Refills: 0 | Status: DISCONTINUED | OUTPATIENT
Start: 2022-01-01 | End: 2022-01-01

## 2022-01-01 RX ORDER — RISPERIDONE 4 MG/1
25 TABLET ORAL ONCE
Refills: 0 | Status: DISCONTINUED | OUTPATIENT
Start: 2022-01-01 | End: 2022-01-01

## 2022-01-01 RX ORDER — MIDAZOLAM HYDROCHLORIDE 1 MG/ML
4 INJECTION, SOLUTION INTRAMUSCULAR; INTRAVENOUS EVERY 4 HOURS
Refills: 0 | Status: DISCONTINUED | OUTPATIENT
Start: 2022-01-01 | End: 2022-01-01

## 2022-01-01 RX ORDER — BUDESONIDE AND FORMOTEROL FUMARATE DIHYDRATE 160; 4.5 UG/1; UG/1
2 AEROSOL RESPIRATORY (INHALATION)
Qty: 0 | Refills: 0 | DISCHARGE

## 2022-01-01 RX ORDER — CEFTRIAXONE 500 MG/1
1000 INJECTION, POWDER, FOR SOLUTION INTRAMUSCULAR; INTRAVENOUS EVERY 24 HOURS
Refills: 0 | Status: DISCONTINUED | OUTPATIENT
Start: 2022-01-01 | End: 2022-01-01

## 2022-01-01 RX ORDER — ACETAMINOPHEN 500 MG
1000 TABLET ORAL ONCE
Refills: 0 | Status: COMPLETED | OUTPATIENT
Start: 2022-01-01 | End: 2022-01-01

## 2022-01-01 RX ORDER — ACETAMINOPHEN 500 MG
2 TABLET ORAL
Qty: 0 | Refills: 0 | DISCHARGE

## 2022-01-01 RX ORDER — CEFEPIME 1 G/1
1000 INJECTION, POWDER, FOR SOLUTION INTRAMUSCULAR; INTRAVENOUS ONCE
Refills: 0 | Status: COMPLETED | OUTPATIENT
Start: 2022-01-01 | End: 2022-01-01

## 2022-01-01 RX ORDER — MIDODRINE HYDROCHLORIDE 2.5 MG/1
10 TABLET ORAL EVERY 8 HOURS
Refills: 0 | Status: DISCONTINUED | OUTPATIENT
Start: 2022-01-01 | End: 2022-01-01

## 2022-01-01 RX ORDER — BUPROPION HYDROCHLORIDE 150 MG/1
1 TABLET, EXTENDED RELEASE ORAL
Qty: 0 | Refills: 0 | DISCHARGE
Start: 2022-01-01

## 2022-01-01 RX ORDER — SIMETHICONE 80 MG/1
80 TABLET, CHEWABLE ORAL
Refills: 0 | Status: DISCONTINUED | OUTPATIENT
Start: 2022-01-01 | End: 2022-01-01

## 2022-01-01 RX ORDER — FUROSEMIDE 40 MG
40 TABLET ORAL DAILY
Refills: 0 | Status: DISCONTINUED | OUTPATIENT
Start: 2022-01-01 | End: 2022-01-01

## 2022-01-01 RX ORDER — CEFEPIME 1 G/1
INJECTION, POWDER, FOR SOLUTION INTRAMUSCULAR; INTRAVENOUS
Refills: 0 | Status: DISCONTINUED | OUTPATIENT
Start: 2022-01-01 | End: 2022-01-01

## 2022-01-01 RX ORDER — CEFEPIME 1 G/1
1000 INJECTION, POWDER, FOR SOLUTION INTRAMUSCULAR; INTRAVENOUS EVERY 8 HOURS
Refills: 0 | Status: DISCONTINUED | OUTPATIENT
Start: 2022-01-01 | End: 2022-01-01

## 2022-01-01 RX ORDER — HYDROCORTISONE 20 MG
50 TABLET ORAL EVERY 6 HOURS
Refills: 0 | Status: DISCONTINUED | OUTPATIENT
Start: 2022-01-01 | End: 2022-01-01

## 2022-01-01 RX ORDER — SENNA PLUS 8.6 MG/1
2 TABLET ORAL
Qty: 0 | Refills: 0 | DISCHARGE

## 2022-01-01 RX ORDER — IPRATROPIUM/ALBUTEROL SULFATE 18-103MCG
3 AEROSOL WITH ADAPTER (GRAM) INHALATION EVERY 6 HOURS
Refills: 0 | Status: DISCONTINUED | OUTPATIENT
Start: 2022-01-01 | End: 2022-01-01

## 2022-01-01 RX ORDER — ZINC OXIDE 200 MG/G
1 OINTMENT TOPICAL
Refills: 0 | Status: DISCONTINUED | OUTPATIENT
Start: 2022-01-01 | End: 2022-01-01

## 2022-01-01 RX ORDER — ERYTHROPOIETIN 10000 [IU]/ML
8000 INJECTION, SOLUTION INTRAVENOUS; SUBCUTANEOUS
Refills: 0 | Status: DISCONTINUED | OUTPATIENT
Start: 2022-01-01 | End: 2022-01-01

## 2022-01-01 RX ORDER — AZITHROMYCIN 500 MG/1
500 TABLET, FILM COATED ORAL EVERY 24 HOURS
Refills: 0 | Status: COMPLETED | OUTPATIENT
Start: 2022-01-01 | End: 2022-01-01

## 2022-01-01 RX ORDER — DEXMEDETOMIDINE HYDROCHLORIDE IN 0.9% SODIUM CHLORIDE 4 UG/ML
0.5 INJECTION INTRAVENOUS
Qty: 400 | Refills: 0 | Status: DISCONTINUED | OUTPATIENT
Start: 2022-01-01 | End: 2022-01-01

## 2022-01-01 RX ORDER — SENNA PLUS 8.6 MG/1
2 TABLET ORAL AT BEDTIME
Refills: 0 | Status: DISCONTINUED | OUTPATIENT
Start: 2022-01-01 | End: 2022-01-01

## 2022-01-01 RX ORDER — FENTANYL CITRATE 50 UG/ML
1 INJECTION INTRAVENOUS
Qty: 2500 | Refills: 0 | Status: DISCONTINUED | OUTPATIENT
Start: 2022-01-01 | End: 2022-01-01

## 2022-01-01 RX ORDER — DIVALPROEX SODIUM 500 MG/1
1 TABLET, DELAYED RELEASE ORAL
Qty: 60 | Refills: 0
Start: 2022-01-01 | End: 2022-01-01

## 2022-01-01 RX ORDER — FERROUS SULFATE 325(65) MG
1 TABLET ORAL
Qty: 0 | Refills: 0 | DISCHARGE
Start: 2022-01-01

## 2022-01-01 RX ORDER — SODIUM BICARBONATE 1 MEQ/ML
0.13 SYRINGE (ML) INTRAVENOUS
Qty: 150 | Refills: 0 | Status: DISCONTINUED | OUTPATIENT
Start: 2022-01-01 | End: 2022-01-01

## 2022-01-01 RX ORDER — FENTANYL CITRATE 50 UG/ML
0.5 INJECTION INTRAVENOUS
Qty: 2500 | Refills: 0 | Status: DISCONTINUED | OUTPATIENT
Start: 2022-01-01 | End: 2022-01-01

## 2022-01-01 RX ORDER — CEFAZOLIN SODIUM 1 G
VIAL (EA) INJECTION
Refills: 0 | Status: DISCONTINUED | OUTPATIENT
Start: 2022-01-01 | End: 2022-01-01

## 2022-01-01 RX ORDER — ALBUTEROL 90 UG/1
2 AEROSOL, METERED ORAL EVERY 6 HOURS
Refills: 0 | Status: DISCONTINUED | OUTPATIENT
Start: 2022-01-01 | End: 2022-01-01

## 2022-01-01 RX ORDER — VALPROIC ACID (AS SODIUM SALT) 250 MG/5ML
750 SOLUTION, ORAL ORAL EVERY 12 HOURS
Refills: 0 | Status: DISCONTINUED | OUTPATIENT
Start: 2022-01-01 | End: 2022-01-01

## 2022-01-01 RX ORDER — VALPROIC ACID (AS SODIUM SALT) 250 MG/5ML
200 SOLUTION, ORAL ORAL
Refills: 0 | Status: DISCONTINUED | OUTPATIENT
Start: 2022-01-01 | End: 2022-01-01

## 2022-01-01 RX ORDER — CEFAZOLIN SODIUM 1 G
1000 VIAL (EA) INJECTION ONCE
Refills: 0 | Status: COMPLETED | OUTPATIENT
Start: 2022-01-01 | End: 2022-01-01

## 2022-01-01 RX ORDER — SODIUM CHLORIDE 9 MG/ML
500 INJECTION INTRAMUSCULAR; INTRAVENOUS; SUBCUTANEOUS ONCE
Refills: 0 | Status: COMPLETED | OUTPATIENT
Start: 2022-01-01 | End: 2022-01-01

## 2022-01-01 RX ORDER — BUDESONIDE AND FORMOTEROL FUMARATE DIHYDRATE 160; 4.5 UG/1; UG/1
2 AEROSOL RESPIRATORY (INHALATION)
Refills: 0 | Status: DISCONTINUED | OUTPATIENT
Start: 2022-01-01 | End: 2022-01-01

## 2022-01-01 RX ORDER — DEXMEDETOMIDINE HYDROCHLORIDE IN 0.9% SODIUM CHLORIDE 4 UG/ML
0.1 INJECTION INTRAVENOUS
Qty: 200 | Refills: 0 | Status: DISCONTINUED | OUTPATIENT
Start: 2022-01-01 | End: 2022-01-01

## 2022-01-01 RX ORDER — HYDROMORPHONE HYDROCHLORIDE 2 MG/ML
1 INJECTION INTRAMUSCULAR; INTRAVENOUS; SUBCUTANEOUS
Qty: 0 | Refills: 0 | DISCHARGE

## 2022-01-01 RX ORDER — FENTANYL CITRATE 50 UG/ML
100 INJECTION INTRAVENOUS ONCE
Refills: 0 | Status: DISCONTINUED | OUTPATIENT
Start: 2022-01-01 | End: 2022-01-01

## 2022-01-01 RX ORDER — DEXMEDETOMIDINE HYDROCHLORIDE IN 0.9% SODIUM CHLORIDE 4 UG/ML
0.3 INJECTION INTRAVENOUS
Qty: 400 | Refills: 0 | Status: DISCONTINUED | OUTPATIENT
Start: 2022-01-01 | End: 2022-01-01

## 2022-01-01 RX ORDER — DEXMEDETOMIDINE HYDROCHLORIDE IN 0.9% SODIUM CHLORIDE 4 UG/ML
0.5 INJECTION INTRAVENOUS
Qty: 200 | Refills: 0 | Status: DISCONTINUED | OUTPATIENT
Start: 2022-01-01 | End: 2022-01-01

## 2022-01-01 RX ORDER — CALCITRIOL 0.5 UG/1
0.25 CAPSULE ORAL DAILY
Refills: 0 | Status: DISCONTINUED | OUTPATIENT
Start: 2022-01-01 | End: 2022-01-01

## 2022-01-01 RX ORDER — ALBUTEROL 90 UG/1
2 AEROSOL, METERED ORAL
Qty: 0 | Refills: 0 | DISCHARGE

## 2022-01-01 RX ORDER — CHLORHEXIDINE GLUCONATE 213 G/1000ML
15 SOLUTION TOPICAL EVERY 12 HOURS
Refills: 0 | Status: DISCONTINUED | OUTPATIENT
Start: 2022-01-01 | End: 2022-01-01

## 2022-01-01 RX ORDER — VALPROIC ACID (AS SODIUM SALT) 250 MG/5ML
750 SOLUTION, ORAL ORAL
Refills: 0 | Status: DISCONTINUED | OUTPATIENT
Start: 2022-01-01 | End: 2022-01-01

## 2022-01-01 RX ORDER — CALCITRIOL 0.5 UG/1
1 CAPSULE ORAL
Qty: 0 | Refills: 0 | DISCHARGE

## 2022-01-01 RX ORDER — ACETAMINOPHEN 500 MG
2 TABLET ORAL
Qty: 0 | Refills: 0 | DISCHARGE
Start: 2022-01-01

## 2022-01-01 RX ORDER — LIDOCAINE 4 G/100G
1 CREAM TOPICAL DAILY
Refills: 0 | Status: DISCONTINUED | OUTPATIENT
Start: 2022-01-01 | End: 2022-01-01

## 2022-01-01 RX ORDER — SODIUM CHLORIDE 9 MG/ML
4500 INJECTION INTRAMUSCULAR; INTRAVENOUS; SUBCUTANEOUS ONCE
Refills: 0 | Status: COMPLETED | OUTPATIENT
Start: 2022-01-01 | End: 2022-01-01

## 2022-01-01 RX ORDER — ALBUTEROL 90 UG/1
2 AEROSOL, METERED ORAL
Qty: 0 | Refills: 0 | DISCHARGE
Start: 2022-01-01

## 2022-01-01 RX ORDER — POLYETHYLENE GLYCOL 3350 17 G/17G
17 POWDER, FOR SOLUTION ORAL
Qty: 0 | Refills: 0 | DISCHARGE
Start: 2022-01-01

## 2022-01-01 RX ORDER — AMIODARONE HYDROCHLORIDE 400 MG/1
150 TABLET ORAL ONCE
Refills: 0 | Status: COMPLETED | OUTPATIENT
Start: 2022-01-01 | End: 2022-01-01

## 2022-01-01 RX ORDER — MAGNESIUM HYDROXIDE 400 MG/1
30 TABLET, CHEWABLE ORAL
Qty: 0 | Refills: 0 | DISCHARGE

## 2022-01-01 RX ORDER — CEFTRIAXONE 500 MG/1
1000 INJECTION, POWDER, FOR SOLUTION INTRAMUSCULAR; INTRAVENOUS ONCE
Refills: 0 | Status: COMPLETED | OUTPATIENT
Start: 2022-01-01 | End: 2022-01-01

## 2022-01-01 RX ORDER — DIVALPROEX SODIUM 500 MG/1
500 TABLET, DELAYED RELEASE ORAL AT BEDTIME
Refills: 0 | Status: DISCONTINUED | OUTPATIENT
Start: 2022-01-01 | End: 2022-01-01

## 2022-01-01 RX ORDER — POTASSIUM CHLORIDE 20 MEQ
10 PACKET (EA) ORAL
Refills: 0 | Status: COMPLETED | OUTPATIENT
Start: 2022-01-01 | End: 2022-01-01

## 2022-01-01 RX ORDER — DIVALPROEX SODIUM 500 MG/1
1 TABLET, DELAYED RELEASE ORAL
Qty: 0 | Refills: 0 | DISCHARGE

## 2022-01-01 RX ORDER — ASPIRIN/CALCIUM CARB/MAGNESIUM 324 MG
81 TABLET ORAL DAILY
Refills: 0 | Status: DISCONTINUED | OUTPATIENT
Start: 2022-01-01 | End: 2022-01-01

## 2022-01-01 RX ORDER — SIMVASTATIN 20 MG/1
1 TABLET, FILM COATED ORAL
Qty: 0 | Refills: 0 | DISCHARGE

## 2022-01-01 RX ORDER — CEFEPIME 1 G/1
1000 INJECTION, POWDER, FOR SOLUTION INTRAMUSCULAR; INTRAVENOUS EVERY 24 HOURS
Refills: 0 | Status: DISCONTINUED | OUTPATIENT
Start: 2022-01-01 | End: 2022-01-01

## 2022-01-01 RX ORDER — POLYETHYLENE GLYCOL 3350 17 G/17G
17 POWDER, FOR SOLUTION ORAL DAILY
Refills: 0 | Status: DISCONTINUED | OUTPATIENT
Start: 2022-01-01 | End: 2022-01-01

## 2022-01-01 RX ORDER — ETOMIDATE 2 MG/ML
20 INJECTION INTRAVENOUS ONCE
Refills: 0 | Status: COMPLETED | OUTPATIENT
Start: 2022-01-01 | End: 2022-01-01

## 2022-01-01 RX ORDER — DIVALPROEX SODIUM 500 MG/1
500 TABLET, DELAYED RELEASE ORAL
Refills: 0 | Status: DISCONTINUED | OUTPATIENT
Start: 2022-01-01 | End: 2022-01-01

## 2022-01-01 RX ORDER — DIVALPROEX SODIUM 500 MG/1
3 TABLET, DELAYED RELEASE ORAL
Qty: 180 | Refills: 0
Start: 2022-01-01 | End: 2023-01-01

## 2022-01-01 RX ORDER — BUPROPION HYDROCHLORIDE 150 MG/1
75 TABLET, EXTENDED RELEASE ORAL EVERY 12 HOURS
Refills: 0 | Status: DISCONTINUED | OUTPATIENT
Start: 2022-01-01 | End: 2022-01-01

## 2022-01-01 RX ORDER — ALBUTEROL 90 UG/1
1 AEROSOL, METERED ORAL EVERY 4 HOURS
Refills: 0 | Status: DISCONTINUED | OUTPATIENT
Start: 2022-01-01 | End: 2022-01-01

## 2022-01-01 RX ORDER — BUPROPION HYDROCHLORIDE 150 MG/1
1 TABLET, EXTENDED RELEASE ORAL
Qty: 0 | Refills: 0 | DISCHARGE

## 2022-01-01 RX ORDER — CEFAZOLIN SODIUM 1 G
1000 VIAL (EA) INJECTION EVERY 12 HOURS
Refills: 0 | Status: DISCONTINUED | OUTPATIENT
Start: 2022-01-01 | End: 2022-01-01

## 2022-01-01 RX ORDER — ALBUTEROL 90 UG/1
2.5 AEROSOL, METERED ORAL EVERY 6 HOURS
Refills: 0 | Status: DISCONTINUED | OUTPATIENT
Start: 2022-01-01 | End: 2022-01-01

## 2022-01-01 RX ORDER — COLLAGENASE CLOSTRIDIUM HIST. 250 UNIT/G
1 OINTMENT (GRAM) TOPICAL
Refills: 0 | Status: DISCONTINUED | OUTPATIENT
Start: 2022-01-01 | End: 2022-01-01

## 2022-01-01 RX ORDER — ATROPINE SULFATE 0.1 MG/ML
0.4 SYRINGE (ML) INJECTION ONCE
Refills: 0 | Status: COMPLETED | OUTPATIENT
Start: 2022-01-01 | End: 2022-01-01

## 2022-01-01 RX ORDER — OFLOXACIN 0.3 %
1 DROPS OPHTHALMIC (EYE) EVERY 4 HOURS
Refills: 0 | Status: DISCONTINUED | OUTPATIENT
Start: 2022-01-01 | End: 2022-01-01

## 2022-01-01 RX ORDER — LEVETIRACETAM 250 MG/1
1000 TABLET, FILM COATED ORAL ONCE
Refills: 0 | Status: COMPLETED | OUTPATIENT
Start: 2022-01-01 | End: 2022-01-01

## 2022-01-01 RX ORDER — MUPIROCIN 20 MG/G
1 OINTMENT TOPICAL
Refills: 0 | Status: DISCONTINUED | OUTPATIENT
Start: 2022-01-01 | End: 2022-01-01

## 2022-01-01 RX ORDER — VALPROIC ACID (AS SODIUM SALT) 250 MG/5ML
500 SOLUTION, ORAL ORAL EVERY 12 HOURS
Refills: 0 | Status: DISCONTINUED | OUTPATIENT
Start: 2022-01-01 | End: 2022-01-01

## 2022-01-01 RX ORDER — VALPROIC ACID (AS SODIUM SALT) 250 MG/5ML
500 SOLUTION, ORAL ORAL THREE TIMES A DAY
Refills: 0 | Status: DISCONTINUED | OUTPATIENT
Start: 2022-01-01 | End: 2022-01-01

## 2022-01-01 RX ORDER — MUPIROCIN 20 MG/G
1 OINTMENT TOPICAL EVERY 12 HOURS
Refills: 0 | Status: DISCONTINUED | OUTPATIENT
Start: 2022-01-01 | End: 2022-01-01

## 2022-01-01 RX ORDER — CHOLECALCIFEROL (VITAMIN D3) 125 MCG
1 CAPSULE ORAL
Qty: 0 | Refills: 0 | DISCHARGE

## 2022-01-01 RX ORDER — FUROSEMIDE 40 MG
1 TABLET ORAL
Qty: 0 | Refills: 0 | DISCHARGE

## 2022-01-01 RX ORDER — IPRATROPIUM/ALBUTEROL SULFATE 18-103MCG
3 AEROSOL WITH ADAPTER (GRAM) INHALATION
Qty: 0 | Refills: 0 | DISCHARGE
Start: 2022-01-01

## 2022-01-01 RX ORDER — AZITHROMYCIN 500 MG/1
500 TABLET, FILM COATED ORAL ONCE
Refills: 0 | Status: COMPLETED | OUTPATIENT
Start: 2022-01-01 | End: 2022-01-01

## 2022-01-01 RX ORDER — LEVOTHYROXINE SODIUM 125 MCG
94 TABLET ORAL AT BEDTIME
Refills: 0 | Status: DISCONTINUED | OUTPATIENT
Start: 2022-01-01 | End: 2022-01-01

## 2022-01-01 RX ORDER — CHLORHEXIDINE GLUCONATE 213 G/1000ML
1 SOLUTION TOPICAL
Refills: 0 | Status: DISCONTINUED | OUTPATIENT
Start: 2022-01-01 | End: 2022-01-01

## 2022-01-01 RX ORDER — LEVETIRACETAM 250 MG/1
500 TABLET, FILM COATED ORAL EVERY 12 HOURS
Refills: 0 | Status: DISCONTINUED | OUTPATIENT
Start: 2022-01-01 | End: 2022-01-01

## 2022-01-01 RX ORDER — CEFEPIME 1 G/1
1000 INJECTION, POWDER, FOR SOLUTION INTRAMUSCULAR; INTRAVENOUS EVERY 12 HOURS
Refills: 0 | Status: DISCONTINUED | OUTPATIENT
Start: 2022-01-01 | End: 2022-01-01

## 2022-01-01 RX ORDER — VALPROIC ACID (AS SODIUM SALT) 250 MG/5ML
150 SOLUTION, ORAL ORAL
Refills: 0 | Status: DISCONTINUED | OUTPATIENT
Start: 2022-01-01 | End: 2022-01-01

## 2022-01-01 RX ORDER — MIDAZOLAM HYDROCHLORIDE 1 MG/ML
4 INJECTION, SOLUTION INTRAMUSCULAR; INTRAVENOUS ONCE
Refills: 0 | Status: DISCONTINUED | OUTPATIENT
Start: 2022-01-01 | End: 2022-01-01

## 2022-01-01 RX ORDER — NOREPINEPHRINE BITARTRATE/D5W 8 MG/250ML
0.08 PLASTIC BAG, INJECTION (ML) INTRAVENOUS
Qty: 8 | Refills: 0 | Status: DISCONTINUED | OUTPATIENT
Start: 2022-01-01 | End: 2022-01-01

## 2022-01-01 RX ORDER — ASPIRIN/CALCIUM CARB/MAGNESIUM 324 MG
1 TABLET ORAL
Qty: 0 | Refills: 0 | DISCHARGE

## 2022-01-01 RX ORDER — KETAMINE HYDROCHLORIDE 100 MG/ML
100 INJECTION INTRAMUSCULAR; INTRAVENOUS ONCE
Refills: 0 | Status: DISCONTINUED | OUTPATIENT
Start: 2022-01-01 | End: 2022-01-01

## 2022-01-01 RX ADMIN — LEVETIRACETAM 420 MILLIGRAM(S): 250 TABLET, FILM COATED ORAL at 17:01

## 2022-01-01 RX ADMIN — Medication 125 MICROGRAM(S): at 05:02

## 2022-01-01 RX ADMIN — SEVELAMER CARBONATE 800 MILLIGRAM(S): 2400 POWDER, FOR SUSPENSION ORAL at 05:08

## 2022-01-01 RX ADMIN — CHLORHEXIDINE GLUCONATE 1 APPLICATION(S): 213 SOLUTION TOPICAL at 06:56

## 2022-01-01 RX ADMIN — SEVELAMER CARBONATE 800 MILLIGRAM(S): 2400 POWDER, FOR SUSPENSION ORAL at 14:26

## 2022-01-01 RX ADMIN — ALBUTEROL 2 PUFF(S): 90 AEROSOL, METERED ORAL at 03:10

## 2022-01-01 RX ADMIN — Medication 50 MILLIGRAM(S): at 17:15

## 2022-01-01 RX ADMIN — AZITHROMYCIN 255 MILLIGRAM(S): 500 TABLET, FILM COATED ORAL at 17:08

## 2022-01-01 RX ADMIN — MUPIROCIN 1 APPLICATION(S): 20 OINTMENT TOPICAL at 05:35

## 2022-01-01 RX ADMIN — SIMETHICONE 80 MILLIGRAM(S): 80 TABLET, CHEWABLE ORAL at 18:32

## 2022-01-01 RX ADMIN — RISPERIDONE 2 MILLIGRAM(S): 4 TABLET ORAL at 11:34

## 2022-01-01 RX ADMIN — MUPIROCIN 1 APPLICATION(S): 20 OINTMENT TOPICAL at 17:31

## 2022-01-01 RX ADMIN — FINASTERIDE 5 MILLIGRAM(S): 5 TABLET, FILM COATED ORAL at 11:15

## 2022-01-01 RX ADMIN — SEVELAMER CARBONATE 800 MILLIGRAM(S): 2400 POWDER, FOR SUSPENSION ORAL at 21:12

## 2022-01-01 RX ADMIN — SEVELAMER CARBONATE 800 MILLIGRAM(S): 2400 POWDER, FOR SUSPENSION ORAL at 05:31

## 2022-01-01 RX ADMIN — Medication 100 MICROGRAM(S): at 05:08

## 2022-01-01 RX ADMIN — MUPIROCIN 1 APPLICATION(S): 20 OINTMENT TOPICAL at 05:41

## 2022-01-01 RX ADMIN — CEFTRIAXONE 100 MILLIGRAM(S): 500 INJECTION, POWDER, FOR SOLUTION INTRAMUSCULAR; INTRAVENOUS at 12:52

## 2022-01-01 RX ADMIN — HEPARIN SODIUM 5000 UNIT(S): 5000 INJECTION INTRAVENOUS; SUBCUTANEOUS at 21:23

## 2022-01-01 RX ADMIN — CHLORHEXIDINE GLUCONATE 15 MILLILITER(S): 213 SOLUTION TOPICAL at 17:15

## 2022-01-01 RX ADMIN — SEVELAMER CARBONATE 800 MILLIGRAM(S): 2400 POWDER, FOR SUSPENSION ORAL at 05:34

## 2022-01-01 RX ADMIN — FINASTERIDE 5 MILLIGRAM(S): 5 TABLET, FILM COATED ORAL at 11:23

## 2022-01-01 RX ADMIN — Medication 1 APPLICATION(S): at 05:33

## 2022-01-01 RX ADMIN — HEPARIN SODIUM 5000 UNIT(S): 5000 INJECTION INTRAVENOUS; SUBCUTANEOUS at 13:19

## 2022-01-01 RX ADMIN — MIDAZOLAM HYDROCHLORIDE 4 MILLIGRAM(S): 1 INJECTION, SOLUTION INTRAMUSCULAR; INTRAVENOUS at 02:00

## 2022-01-01 RX ADMIN — Medication 1 APPLICATION(S): at 17:55

## 2022-01-01 RX ADMIN — Medication 125 MICROGRAM(S): at 05:36

## 2022-01-01 RX ADMIN — Medication 1 APPLICATION(S): at 18:35

## 2022-01-01 RX ADMIN — LIDOCAINE 1 PATCH: 4 CREAM TOPICAL at 11:20

## 2022-01-01 RX ADMIN — CHLORHEXIDINE GLUCONATE 2 APPLICATION(S): 213 SOLUTION TOPICAL at 05:40

## 2022-01-01 RX ADMIN — Medication 325 MILLIGRAM(S): at 02:03

## 2022-01-01 RX ADMIN — ZINC OXIDE 1 APPLICATION(S): 200 OINTMENT TOPICAL at 05:33

## 2022-01-01 RX ADMIN — SEVELAMER CARBONATE 800 MILLIGRAM(S): 2400 POWDER, FOR SUSPENSION ORAL at 18:32

## 2022-01-01 RX ADMIN — SEVELAMER CARBONATE 800 MILLIGRAM(S): 2400 POWDER, FOR SUSPENSION ORAL at 14:09

## 2022-01-01 RX ADMIN — BUPROPION HYDROCHLORIDE 75 MILLIGRAM(S): 150 TABLET, EXTENDED RELEASE ORAL at 05:36

## 2022-01-01 RX ADMIN — Medication 3 MILLILITER(S): at 20:08

## 2022-01-01 RX ADMIN — Medication 27.2 MICROGRAM(S)/KG/MIN: at 08:45

## 2022-01-01 RX ADMIN — Medication 100 MEQ/KG/HR: at 21:00

## 2022-01-01 RX ADMIN — CEFEPIME 100 MILLIGRAM(S): 1 INJECTION, POWDER, FOR SOLUTION INTRAMUSCULAR; INTRAVENOUS at 11:14

## 2022-01-01 RX ADMIN — SEVELAMER CARBONATE 800 MILLIGRAM(S): 2400 POWDER, FOR SUSPENSION ORAL at 14:55

## 2022-01-01 RX ADMIN — HEPARIN SODIUM 5000 UNIT(S): 5000 INJECTION INTRAVENOUS; SUBCUTANEOUS at 05:16

## 2022-01-01 RX ADMIN — Medication 3 MILLILITER(S): at 20:31

## 2022-01-01 RX ADMIN — Medication 30 MILLIGRAM(S): at 05:43

## 2022-01-01 RX ADMIN — TAMSULOSIN HYDROCHLORIDE 0.4 MILLIGRAM(S): 0.4 CAPSULE ORAL at 21:28

## 2022-01-01 RX ADMIN — Medication 27.2 MICROGRAM(S)/KG/MIN: at 10:21

## 2022-01-01 RX ADMIN — MUPIROCIN 1 APPLICATION(S): 20 OINTMENT TOPICAL at 06:27

## 2022-01-01 RX ADMIN — SODIUM CHLORIDE 500 MILLILITER(S): 9 INJECTION INTRAMUSCULAR; INTRAVENOUS; SUBCUTANEOUS at 18:48

## 2022-01-01 RX ADMIN — ALBUTEROL 2.5 MILLIGRAM(S): 90 AEROSOL, METERED ORAL at 03:07

## 2022-01-01 RX ADMIN — Medication 3 MILLILITER(S): at 20:24

## 2022-01-01 RX ADMIN — SENNA PLUS 10 MILLILITER(S): 8.6 TABLET ORAL at 22:29

## 2022-01-01 RX ADMIN — SODIUM CHLORIDE 100 MILLILITER(S): 9 INJECTION, SOLUTION INTRAVENOUS at 04:39

## 2022-01-01 RX ADMIN — ZINC OXIDE 1 APPLICATION(S): 200 OINTMENT TOPICAL at 17:39

## 2022-01-01 RX ADMIN — HEPARIN SODIUM 5000 UNIT(S): 5000 INJECTION INTRAVENOUS; SUBCUTANEOUS at 23:33

## 2022-01-01 RX ADMIN — Medication 1 APPLICATION(S): at 17:07

## 2022-01-01 RX ADMIN — LIDOCAINE 1 PATCH: 4 CREAM TOPICAL at 20:23

## 2022-01-01 RX ADMIN — Medication 1 APPLICATION(S): at 05:36

## 2022-01-01 RX ADMIN — MIDODRINE HYDROCHLORIDE 10 MILLIGRAM(S): 2.5 TABLET ORAL at 14:11

## 2022-01-01 RX ADMIN — MUPIROCIN 1 APPLICATION(S): 20 OINTMENT TOPICAL at 05:17

## 2022-01-01 RX ADMIN — POLYETHYLENE GLYCOL 3350 17 GRAM(S): 17 POWDER, FOR SOLUTION ORAL at 13:03

## 2022-01-01 RX ADMIN — ZINC OXIDE 1 APPLICATION(S): 200 OINTMENT TOPICAL at 05:11

## 2022-01-01 RX ADMIN — ZINC OXIDE 1 APPLICATION(S): 200 OINTMENT TOPICAL at 17:21

## 2022-01-01 RX ADMIN — ZINC OXIDE 1 APPLICATION(S): 200 OINTMENT TOPICAL at 05:34

## 2022-01-01 RX ADMIN — BUPROPION HYDROCHLORIDE 75 MILLIGRAM(S): 150 TABLET, EXTENDED RELEASE ORAL at 05:18

## 2022-01-01 RX ADMIN — CHLORHEXIDINE GLUCONATE 1 APPLICATION(S): 213 SOLUTION TOPICAL at 06:13

## 2022-01-01 RX ADMIN — Medication 57.5 MILLIGRAM(S): at 18:25

## 2022-01-01 RX ADMIN — PANTOPRAZOLE SODIUM 40 MILLIGRAM(S): 20 TABLET, DELAYED RELEASE ORAL at 11:25

## 2022-01-01 RX ADMIN — Medication 125 MICROGRAM(S): at 05:37

## 2022-01-01 RX ADMIN — HEPARIN SODIUM 5000 UNIT(S): 5000 INJECTION INTRAVENOUS; SUBCUTANEOUS at 21:42

## 2022-01-01 RX ADMIN — LEVETIRACETAM 420 MILLIGRAM(S): 250 TABLET, FILM COATED ORAL at 06:13

## 2022-01-01 RX ADMIN — CEFEPIME 100 MILLIGRAM(S): 1 INJECTION, POWDER, FOR SOLUTION INTRAMUSCULAR; INTRAVENOUS at 05:14

## 2022-01-01 RX ADMIN — TAMSULOSIN HYDROCHLORIDE 0.4 MILLIGRAM(S): 0.4 CAPSULE ORAL at 23:33

## 2022-01-01 RX ADMIN — Medication 500 MILLIGRAM(S): at 22:48

## 2022-01-01 RX ADMIN — CHLORHEXIDINE GLUCONATE 15 MILLILITER(S): 213 SOLUTION TOPICAL at 17:44

## 2022-01-01 RX ADMIN — Medication 1000 MILLIGRAM(S): at 23:51

## 2022-01-01 RX ADMIN — Medication 3 MILLILITER(S): at 20:00

## 2022-01-01 RX ADMIN — HEPARIN SODIUM 5000 UNIT(S): 5000 INJECTION INTRAVENOUS; SUBCUTANEOUS at 21:26

## 2022-01-01 RX ADMIN — HEPARIN SODIUM 5000 UNIT(S): 5000 INJECTION INTRAVENOUS; SUBCUTANEOUS at 22:48

## 2022-01-01 RX ADMIN — FINASTERIDE 5 MILLIGRAM(S): 5 TABLET, FILM COATED ORAL at 12:50

## 2022-01-01 RX ADMIN — HEPARIN SODIUM 5000 UNIT(S): 5000 INJECTION INTRAVENOUS; SUBCUTANEOUS at 06:27

## 2022-01-01 RX ADMIN — BUPROPION HYDROCHLORIDE 150 MILLIGRAM(S): 150 TABLET, EXTENDED RELEASE ORAL at 12:44

## 2022-01-01 RX ADMIN — Medication 650 MILLIGRAM(S): at 04:45

## 2022-01-01 RX ADMIN — SENNA PLUS 10 MILLILITER(S): 8.6 TABLET ORAL at 21:24

## 2022-01-01 RX ADMIN — Medication 3 MILLILITER(S): at 08:52

## 2022-01-01 RX ADMIN — ALBUTEROL 2 PUFF(S): 90 AEROSOL, METERED ORAL at 16:08

## 2022-01-01 RX ADMIN — HEPARIN SODIUM 5000 UNIT(S): 5000 INJECTION INTRAVENOUS; SUBCUTANEOUS at 14:32

## 2022-01-01 RX ADMIN — SODIUM CHLORIDE 100 MILLILITER(S): 9 INJECTION, SOLUTION INTRAVENOUS at 23:10

## 2022-01-01 RX ADMIN — CEFEPIME 100 MILLIGRAM(S): 1 INJECTION, POWDER, FOR SOLUTION INTRAMUSCULAR; INTRAVENOUS at 15:58

## 2022-01-01 RX ADMIN — BUPROPION HYDROCHLORIDE 75 MILLIGRAM(S): 150 TABLET, EXTENDED RELEASE ORAL at 17:20

## 2022-01-01 RX ADMIN — Medication 750 MILLIGRAM(S): at 05:20

## 2022-01-01 RX ADMIN — Medication 1 APPLICATION(S): at 05:38

## 2022-01-01 RX ADMIN — Medication 1 APPLICATION(S): at 17:39

## 2022-01-01 RX ADMIN — SEVELAMER CARBONATE 800 MILLIGRAM(S): 2400 POWDER, FOR SUSPENSION ORAL at 22:29

## 2022-01-01 RX ADMIN — RISPERIDONE 2 MILLIGRAM(S): 4 TABLET ORAL at 11:23

## 2022-01-01 RX ADMIN — Medication 1 MILLIGRAM(S): at 21:41

## 2022-01-01 RX ADMIN — Medication 17.1 MICROGRAM(S)/KG/MIN: at 09:14

## 2022-01-01 RX ADMIN — FINASTERIDE 5 MILLIGRAM(S): 5 TABLET, FILM COATED ORAL at 18:30

## 2022-01-01 RX ADMIN — SEVELAMER CARBONATE 800 MILLIGRAM(S): 2400 POWDER, FOR SUSPENSION ORAL at 05:41

## 2022-01-01 RX ADMIN — FINASTERIDE 5 MILLIGRAM(S): 5 TABLET, FILM COATED ORAL at 11:25

## 2022-01-01 RX ADMIN — ZINC OXIDE 1 APPLICATION(S): 200 OINTMENT TOPICAL at 06:14

## 2022-01-01 RX ADMIN — Medication 57.5 MILLIGRAM(S): at 02:59

## 2022-01-01 RX ADMIN — MUPIROCIN 1 APPLICATION(S): 20 OINTMENT TOPICAL at 17:02

## 2022-01-01 RX ADMIN — Medication 1 APPLICATION(S): at 05:35

## 2022-01-01 RX ADMIN — Medication 3 MILLILITER(S): at 09:17

## 2022-01-01 RX ADMIN — ALBUTEROL 2 PUFF(S): 90 AEROSOL, METERED ORAL at 10:04

## 2022-01-01 RX ADMIN — BUPROPION HYDROCHLORIDE 150 MILLIGRAM(S): 150 TABLET, EXTENDED RELEASE ORAL at 11:51

## 2022-01-01 RX ADMIN — MUPIROCIN 1 APPLICATION(S): 20 OINTMENT TOPICAL at 17:45

## 2022-01-01 RX ADMIN — MIDAZOLAM HYDROCHLORIDE 1 MILLIGRAM(S): 1 INJECTION, SOLUTION INTRAMUSCULAR; INTRAVENOUS at 22:02

## 2022-01-01 RX ADMIN — ZINC OXIDE 1 APPLICATION(S): 200 OINTMENT TOPICAL at 06:28

## 2022-01-01 RX ADMIN — ALBUTEROL 2 PUFF(S): 90 AEROSOL, METERED ORAL at 20:15

## 2022-01-01 RX ADMIN — HEPARIN SODIUM 5000 UNIT(S): 5000 INJECTION INTRAVENOUS; SUBCUTANEOUS at 13:02

## 2022-01-01 RX ADMIN — Medication 3 MILLILITER(S): at 08:31

## 2022-01-01 RX ADMIN — HEPARIN SODIUM 5000 UNIT(S): 5000 INJECTION INTRAVENOUS; SUBCUTANEOUS at 15:00

## 2022-01-01 RX ADMIN — Medication 3 MILLILITER(S): at 09:32

## 2022-01-01 RX ADMIN — Medication 3 MILLILITER(S): at 17:05

## 2022-01-01 RX ADMIN — MUPIROCIN 1 APPLICATION(S): 20 OINTMENT TOPICAL at 06:14

## 2022-01-01 RX ADMIN — Medication 1 MILLIGRAM(S): at 21:23

## 2022-01-01 RX ADMIN — Medication 3 MILLILITER(S): at 15:39

## 2022-01-01 RX ADMIN — Medication 3 MILLILITER(S): at 08:32

## 2022-01-01 RX ADMIN — ZINC OXIDE 1 APPLICATION(S): 200 OINTMENT TOPICAL at 17:07

## 2022-01-01 RX ADMIN — DEXMEDETOMIDINE HYDROCHLORIDE IN 0.9% SODIUM CHLORIDE 18.1 MICROGRAM(S)/KG/HR: 4 INJECTION INTRAVENOUS at 02:35

## 2022-01-01 RX ADMIN — HEPARIN SODIUM 5000 UNIT(S): 5000 INJECTION INTRAVENOUS; SUBCUTANEOUS at 15:57

## 2022-01-01 RX ADMIN — ZINC OXIDE 1 APPLICATION(S): 200 OINTMENT TOPICAL at 18:16

## 2022-01-01 RX ADMIN — SENNA PLUS 2 TABLET(S): 8.6 TABLET ORAL at 21:29

## 2022-01-01 RX ADMIN — Medication 1 APPLICATION(S): at 18:17

## 2022-01-01 RX ADMIN — SEVELAMER CARBONATE 800 MILLIGRAM(S): 2400 POWDER, FOR SUSPENSION ORAL at 05:20

## 2022-01-01 RX ADMIN — CHLORHEXIDINE GLUCONATE 15 MILLILITER(S): 213 SOLUTION TOPICAL at 17:09

## 2022-01-01 RX ADMIN — PANTOPRAZOLE SODIUM 40 MILLIGRAM(S): 20 TABLET, DELAYED RELEASE ORAL at 12:53

## 2022-01-01 RX ADMIN — MUPIROCIN 1 APPLICATION(S): 20 OINTMENT TOPICAL at 05:12

## 2022-01-01 RX ADMIN — Medication 3 MILLILITER(S): at 03:19

## 2022-01-01 RX ADMIN — Medication 250 MILLIGRAM(S): at 17:11

## 2022-01-01 RX ADMIN — HEPARIN SODIUM 5000 UNIT(S): 5000 INJECTION INTRAVENOUS; SUBCUTANEOUS at 13:57

## 2022-01-01 RX ADMIN — HEPARIN SODIUM 5000 UNIT(S): 5000 INJECTION INTRAVENOUS; SUBCUTANEOUS at 05:30

## 2022-01-01 RX ADMIN — SEVELAMER CARBONATE 800 MILLIGRAM(S): 2400 POWDER, FOR SUSPENSION ORAL at 14:08

## 2022-01-01 RX ADMIN — CHLORHEXIDINE GLUCONATE 15 MILLILITER(S): 213 SOLUTION TOPICAL at 05:08

## 2022-01-01 RX ADMIN — SEVELAMER CARBONATE 800 MILLIGRAM(S): 2400 POWDER, FOR SUSPENSION ORAL at 21:42

## 2022-01-01 RX ADMIN — HEPARIN SODIUM 5000 UNIT(S): 5000 INJECTION INTRAVENOUS; SUBCUTANEOUS at 05:40

## 2022-01-01 RX ADMIN — CHLORHEXIDINE GLUCONATE 2 APPLICATION(S): 213 SOLUTION TOPICAL at 05:35

## 2022-01-01 RX ADMIN — Medication 50 MILLIGRAM(S): at 11:52

## 2022-01-01 RX ADMIN — POLYETHYLENE GLYCOL 3350 17 GRAM(S): 17 POWDER, FOR SOLUTION ORAL at 21:24

## 2022-01-01 RX ADMIN — Medication 3 MILLILITER(S): at 03:09

## 2022-01-01 RX ADMIN — Medication 3 MILLILITER(S): at 09:16

## 2022-01-01 RX ADMIN — ZINC OXIDE 1 APPLICATION(S): 200 OINTMENT TOPICAL at 17:00

## 2022-01-01 RX ADMIN — Medication 125 MICROGRAM(S): at 05:30

## 2022-01-01 RX ADMIN — MIDODRINE HYDROCHLORIDE 10 MILLIGRAM(S): 2.5 TABLET ORAL at 05:41

## 2022-01-01 RX ADMIN — DEXMEDETOMIDINE HYDROCHLORIDE IN 0.9% SODIUM CHLORIDE 18.1 MICROGRAM(S)/KG/HR: 4 INJECTION INTRAVENOUS at 13:55

## 2022-01-01 RX ADMIN — DIVALPROEX SODIUM 500 MILLIGRAM(S): 500 TABLET, DELAYED RELEASE ORAL at 17:15

## 2022-01-01 RX ADMIN — Medication 50 MILLIGRAM(S): at 14:04

## 2022-01-01 RX ADMIN — TAMSULOSIN HYDROCHLORIDE 0.4 MILLIGRAM(S): 0.4 CAPSULE ORAL at 21:07

## 2022-01-01 RX ADMIN — Medication 1 APPLICATION(S): at 05:12

## 2022-01-01 RX ADMIN — SEVELAMER CARBONATE 800 MILLIGRAM(S): 2400 POWDER, FOR SUSPENSION ORAL at 21:24

## 2022-01-01 RX ADMIN — BUPROPION HYDROCHLORIDE 75 MILLIGRAM(S): 150 TABLET, EXTENDED RELEASE ORAL at 05:35

## 2022-01-01 RX ADMIN — HEPARIN SODIUM 5000 UNIT(S): 5000 INJECTION INTRAVENOUS; SUBCUTANEOUS at 05:35

## 2022-01-01 RX ADMIN — Medication 3 MILLILITER(S): at 08:35

## 2022-01-01 RX ADMIN — DEXMEDETOMIDINE HYDROCHLORIDE IN 0.9% SODIUM CHLORIDE 3.63 MICROGRAM(S)/KG/HR: 4 INJECTION INTRAVENOUS at 08:44

## 2022-01-01 RX ADMIN — PANTOPRAZOLE SODIUM 40 MILLIGRAM(S): 20 TABLET, DELAYED RELEASE ORAL at 13:10

## 2022-01-01 RX ADMIN — Medication 125 MICROGRAM(S): at 05:18

## 2022-01-01 RX ADMIN — Medication 250 MILLIGRAM(S): at 12:48

## 2022-01-01 RX ADMIN — RISPERIDONE 2 MILLIGRAM(S): 4 TABLET ORAL at 21:23

## 2022-01-01 RX ADMIN — ZINC OXIDE 1 APPLICATION(S): 200 OINTMENT TOPICAL at 05:39

## 2022-01-01 RX ADMIN — Medication 3 MILLILITER(S): at 15:35

## 2022-01-01 RX ADMIN — Medication 250 MILLIGRAM(S): at 02:09

## 2022-01-01 RX ADMIN — Medication 1 APPLICATION(S): at 17:21

## 2022-01-01 RX ADMIN — Medication 40 MILLIGRAM(S): at 17:53

## 2022-01-01 RX ADMIN — HEPARIN SODIUM 5000 UNIT(S): 5000 INJECTION INTRAVENOUS; SUBCUTANEOUS at 06:14

## 2022-01-01 RX ADMIN — Medication 750 MILLIGRAM(S): at 23:33

## 2022-01-01 RX ADMIN — Medication 109 MICROGRAM(S)/KG/MIN: at 21:50

## 2022-01-01 RX ADMIN — CEFEPIME 100 MILLIGRAM(S): 1 INJECTION, POWDER, FOR SOLUTION INTRAMUSCULAR; INTRAVENOUS at 10:17

## 2022-01-01 RX ADMIN — SEVELAMER CARBONATE 800 MILLIGRAM(S): 2400 POWDER, FOR SUSPENSION ORAL at 05:10

## 2022-01-01 RX ADMIN — RISPERIDONE 2 MILLIGRAM(S): 4 TABLET ORAL at 11:15

## 2022-01-01 RX ADMIN — BUPROPION HYDROCHLORIDE 75 MILLIGRAM(S): 150 TABLET, EXTENDED RELEASE ORAL at 17:41

## 2022-01-01 RX ADMIN — SEVELAMER CARBONATE 800 MILLIGRAM(S): 2400 POWDER, FOR SUSPENSION ORAL at 21:23

## 2022-01-01 RX ADMIN — Medication 150 MILLIGRAM(S): at 05:08

## 2022-01-01 RX ADMIN — Medication 2 MILLIGRAM(S): at 08:55

## 2022-01-01 RX ADMIN — Medication 100 MEQ/KG/HR: at 07:19

## 2022-01-01 RX ADMIN — HEPARIN SODIUM 5000 UNIT(S): 5000 INJECTION INTRAVENOUS; SUBCUTANEOUS at 05:36

## 2022-01-01 RX ADMIN — Medication 125 MICROGRAM(S): at 05:22

## 2022-01-01 RX ADMIN — Medication 1 APPLICATION(S): at 05:32

## 2022-01-01 RX ADMIN — Medication 1 APPLICATION(S): at 05:19

## 2022-01-01 RX ADMIN — Medication 94 MICROGRAM(S): at 01:59

## 2022-01-01 RX ADMIN — HEPARIN SODIUM 5000 UNIT(S): 5000 INJECTION INTRAVENOUS; SUBCUTANEOUS at 14:01

## 2022-01-01 RX ADMIN — Medication 3 MILLILITER(S): at 03:24

## 2022-01-01 RX ADMIN — Medication 40 MILLIGRAM(S): at 05:15

## 2022-01-01 RX ADMIN — Medication 3 MILLILITER(S): at 03:27

## 2022-01-01 RX ADMIN — Medication 5.34 MICROGRAM(S)/KG/MIN: at 07:34

## 2022-01-01 RX ADMIN — Medication 1 MILLIGRAM(S): at 22:10

## 2022-01-01 RX ADMIN — DIVALPROEX SODIUM 500 MILLIGRAM(S): 500 TABLET, DELAYED RELEASE ORAL at 05:15

## 2022-01-01 RX ADMIN — HEPARIN SODIUM 5000 UNIT(S): 5000 INJECTION INTRAVENOUS; SUBCUTANEOUS at 21:48

## 2022-01-01 RX ADMIN — MUPIROCIN 1 APPLICATION(S): 20 OINTMENT TOPICAL at 17:57

## 2022-01-01 RX ADMIN — HEPARIN SODIUM 5000 UNIT(S): 5000 INJECTION INTRAVENOUS; SUBCUTANEOUS at 05:37

## 2022-01-01 RX ADMIN — MIDAZOLAM HYDROCHLORIDE 2 MILLIGRAM(S): 1 INJECTION, SOLUTION INTRAMUSCULAR; INTRAVENOUS at 01:33

## 2022-01-01 RX ADMIN — Medication 3 MILLILITER(S): at 08:13

## 2022-01-01 RX ADMIN — HEPARIN SODIUM 5000 UNIT(S): 5000 INJECTION INTRAVENOUS; SUBCUTANEOUS at 22:30

## 2022-01-01 RX ADMIN — FENTANYL CITRATE 11.4 MICROGRAM(S)/KG/HR: 50 INJECTION INTRAVENOUS at 13:58

## 2022-01-01 RX ADMIN — ALBUTEROL 2 PUFF(S): 90 AEROSOL, METERED ORAL at 16:06

## 2022-01-01 RX ADMIN — Medication 3 MILLILITER(S): at 16:11

## 2022-01-01 RX ADMIN — Medication 50 MILLIGRAM(S): at 01:59

## 2022-01-01 RX ADMIN — MUPIROCIN 1 APPLICATION(S): 20 OINTMENT TOPICAL at 05:01

## 2022-01-01 RX ADMIN — SEVELAMER CARBONATE 800 MILLIGRAM(S): 2400 POWDER, FOR SUSPENSION ORAL at 13:04

## 2022-01-01 RX ADMIN — AZITHROMYCIN 255 MILLIGRAM(S): 500 TABLET, FILM COATED ORAL at 18:50

## 2022-01-01 RX ADMIN — CEFEPIME 100 MILLIGRAM(S): 1 INJECTION, POWDER, FOR SOLUTION INTRAMUSCULAR; INTRAVENOUS at 05:37

## 2022-01-01 RX ADMIN — PROPOFOL 6.83 MICROGRAM(S)/KG/MIN: 10 INJECTION, EMULSION INTRAVENOUS at 17:09

## 2022-01-01 RX ADMIN — Medication 1 MILLIGRAM(S): at 21:28

## 2022-01-01 RX ADMIN — Medication 57.5 MILLIGRAM(S): at 05:30

## 2022-01-01 RX ADMIN — TAMSULOSIN HYDROCHLORIDE 0.4 MILLIGRAM(S): 0.4 CAPSULE ORAL at 21:24

## 2022-01-01 RX ADMIN — LEVETIRACETAM 420 MILLIGRAM(S): 250 TABLET, FILM COATED ORAL at 05:02

## 2022-01-01 RX ADMIN — DIVALPROEX SODIUM 500 MILLIGRAM(S): 500 TABLET, DELAYED RELEASE ORAL at 18:30

## 2022-01-01 RX ADMIN — BUPROPION HYDROCHLORIDE 150 MILLIGRAM(S): 150 TABLET, EXTENDED RELEASE ORAL at 21:40

## 2022-01-01 RX ADMIN — AZITHROMYCIN 255 MILLIGRAM(S): 500 TABLET, FILM COATED ORAL at 17:12

## 2022-01-01 RX ADMIN — HEPARIN SODIUM 5000 UNIT(S): 5000 INJECTION INTRAVENOUS; SUBCUTANEOUS at 14:10

## 2022-01-01 RX ADMIN — Medication 3 MILLILITER(S): at 20:36

## 2022-01-01 RX ADMIN — Medication 57.5 MILLIGRAM(S): at 18:41

## 2022-01-01 RX ADMIN — RISPERIDONE 2 MILLIGRAM(S): 4 TABLET ORAL at 14:13

## 2022-01-01 RX ADMIN — MIDODRINE HYDROCHLORIDE 10 MILLIGRAM(S): 2.5 TABLET ORAL at 13:03

## 2022-01-01 RX ADMIN — Medication 325 MILLIGRAM(S): at 11:11

## 2022-01-01 RX ADMIN — ALBUTEROL 2 PUFF(S): 90 AEROSOL, METERED ORAL at 20:39

## 2022-01-01 RX ADMIN — HEPARIN SODIUM 5000 UNIT(S): 5000 INJECTION INTRAVENOUS; SUBCUTANEOUS at 14:55

## 2022-01-01 RX ADMIN — Medication 3 MILLILITER(S): at 03:14

## 2022-01-01 RX ADMIN — Medication 1 APPLICATION(S): at 17:01

## 2022-01-01 RX ADMIN — Medication 109 MICROGRAM(S)/KG/MIN: at 13:55

## 2022-01-01 RX ADMIN — TAMSULOSIN HYDROCHLORIDE 0.4 MILLIGRAM(S): 0.4 CAPSULE ORAL at 21:12

## 2022-01-01 RX ADMIN — Medication 975 MILLIGRAM(S): at 10:00

## 2022-01-01 RX ADMIN — FENTANYL CITRATE 5.7 MICROGRAM(S)/KG/HR: 50 INJECTION INTRAVENOUS at 17:07

## 2022-01-01 RX ADMIN — DIVALPROEX SODIUM 500 MILLIGRAM(S): 500 TABLET, DELAYED RELEASE ORAL at 17:28

## 2022-01-01 RX ADMIN — AZITHROMYCIN 255 MILLIGRAM(S): 500 TABLET, FILM COATED ORAL at 08:02

## 2022-01-01 RX ADMIN — Medication 3 MILLILITER(S): at 08:11

## 2022-01-01 RX ADMIN — SEVELAMER CARBONATE 800 MILLIGRAM(S): 2400 POWDER, FOR SUSPENSION ORAL at 21:39

## 2022-01-01 RX ADMIN — SEVELAMER CARBONATE 800 MILLIGRAM(S): 2400 POWDER, FOR SUSPENSION ORAL at 08:38

## 2022-01-01 RX ADMIN — Medication 55 MILLIGRAM(S): at 18:53

## 2022-01-01 RX ADMIN — Medication 1 APPLICATION(S): at 05:21

## 2022-01-01 RX ADMIN — Medication 400 MILLIGRAM(S): at 17:38

## 2022-01-01 RX ADMIN — PANTOPRAZOLE SODIUM 40 MILLIGRAM(S): 20 TABLET, DELAYED RELEASE ORAL at 12:31

## 2022-01-01 RX ADMIN — MUPIROCIN 1 APPLICATION(S): 20 OINTMENT TOPICAL at 05:38

## 2022-01-01 RX ADMIN — FENTANYL CITRATE 5.7 MICROGRAM(S)/KG/HR: 50 INJECTION INTRAVENOUS at 22:00

## 2022-01-01 RX ADMIN — SEVELAMER CARBONATE 800 MILLIGRAM(S): 2400 POWDER, FOR SUSPENSION ORAL at 05:37

## 2022-01-01 RX ADMIN — Medication 17.1 MICROGRAM(S)/KG/MIN: at 18:48

## 2022-01-01 RX ADMIN — ZINC OXIDE 1 APPLICATION(S): 200 OINTMENT TOPICAL at 17:55

## 2022-01-01 RX ADMIN — Medication 1 MILLIGRAM(S): at 21:39

## 2022-01-01 RX ADMIN — Medication 250 MILLIGRAM(S): at 02:06

## 2022-01-01 RX ADMIN — BUPROPION HYDROCHLORIDE 75 MILLIGRAM(S): 150 TABLET, EXTENDED RELEASE ORAL at 18:32

## 2022-01-01 RX ADMIN — Medication 1 APPLICATION(S): at 05:01

## 2022-01-01 RX ADMIN — Medication 125 MICROGRAM(S): at 05:07

## 2022-01-01 RX ADMIN — SODIUM CHLORIDE 4500 MILLILITER(S): 9 INJECTION INTRAMUSCULAR; INTRAVENOUS; SUBCUTANEOUS at 08:01

## 2022-01-01 RX ADMIN — Medication 50 MILLIGRAM(S): at 05:06

## 2022-01-01 RX ADMIN — Medication 40 MILLIEQUIVALENT(S): at 09:37

## 2022-01-01 RX ADMIN — Medication 3 MILLILITER(S): at 09:34

## 2022-01-01 RX ADMIN — SEVELAMER CARBONATE 800 MILLIGRAM(S): 2400 POWDER, FOR SUSPENSION ORAL at 09:12

## 2022-01-01 RX ADMIN — CHLORHEXIDINE GLUCONATE 15 MILLILITER(S): 213 SOLUTION TOPICAL at 06:27

## 2022-01-01 RX ADMIN — RISPERIDONE 2 MILLIGRAM(S): 4 TABLET ORAL at 21:28

## 2022-01-01 RX ADMIN — Medication 650 MILLIGRAM(S): at 11:24

## 2022-01-01 RX ADMIN — RISPERIDONE 2 MILLIGRAM(S): 4 TABLET ORAL at 22:48

## 2022-01-01 RX ADMIN — Medication 250 MILLIGRAM(S): at 06:28

## 2022-01-01 RX ADMIN — HEPARIN SODIUM 5000 UNIT(S): 5000 INJECTION INTRAVENOUS; SUBCUTANEOUS at 05:18

## 2022-01-01 RX ADMIN — SEVELAMER CARBONATE 800 MILLIGRAM(S): 2400 POWDER, FOR SUSPENSION ORAL at 15:06

## 2022-01-01 RX ADMIN — SEVELAMER CARBONATE 800 MILLIGRAM(S): 2400 POWDER, FOR SUSPENSION ORAL at 22:50

## 2022-01-01 RX ADMIN — Medication 50 MILLIGRAM(S): at 02:06

## 2022-01-01 RX ADMIN — MUPIROCIN 1 APPLICATION(S): 20 OINTMENT TOPICAL at 05:18

## 2022-01-01 RX ADMIN — BUPROPION HYDROCHLORIDE 75 MILLIGRAM(S): 150 TABLET, EXTENDED RELEASE ORAL at 06:26

## 2022-01-01 RX ADMIN — CALCITRIOL 0.25 MICROGRAM(S): 0.5 CAPSULE ORAL at 18:02

## 2022-01-01 RX ADMIN — Medication 40 MILLIGRAM(S): at 05:34

## 2022-01-01 RX ADMIN — Medication 650 MILLIGRAM(S): at 10:48

## 2022-01-01 RX ADMIN — Medication 17.1 MICROGRAM(S)/KG/MIN: at 22:29

## 2022-01-01 RX ADMIN — ETOMIDATE 20 MILLIGRAM(S): 2 INJECTION INTRAVENOUS at 08:01

## 2022-01-01 RX ADMIN — Medication 10 MILLIGRAM(S): at 15:40

## 2022-01-01 RX ADMIN — TAMSULOSIN HYDROCHLORIDE 0.4 MILLIGRAM(S): 0.4 CAPSULE ORAL at 22:42

## 2022-01-01 RX ADMIN — BUPROPION HYDROCHLORIDE 75 MILLIGRAM(S): 150 TABLET, EXTENDED RELEASE ORAL at 17:47

## 2022-01-01 RX ADMIN — ZINC OXIDE 1 APPLICATION(S): 200 OINTMENT TOPICAL at 05:36

## 2022-01-01 RX ADMIN — RISPERIDONE 2 MILLIGRAM(S): 4 TABLET ORAL at 13:02

## 2022-01-01 RX ADMIN — ZINC OXIDE 1 APPLICATION(S): 200 OINTMENT TOPICAL at 05:09

## 2022-01-01 RX ADMIN — SIMETHICONE 80 MILLIGRAM(S): 80 TABLET, CHEWABLE ORAL at 09:16

## 2022-01-01 RX ADMIN — ALBUTEROL 2.5 MILLIGRAM(S): 90 AEROSOL, METERED ORAL at 15:38

## 2022-01-01 RX ADMIN — Medication 50 MILLIGRAM(S): at 06:27

## 2022-01-01 RX ADMIN — AZITHROMYCIN 255 MILLIGRAM(S): 500 TABLET, FILM COATED ORAL at 16:54

## 2022-01-01 RX ADMIN — ALBUTEROL 2 PUFF(S): 90 AEROSOL, METERED ORAL at 15:53

## 2022-01-01 RX ADMIN — BUPROPION HYDROCHLORIDE 75 MILLIGRAM(S): 150 TABLET, EXTENDED RELEASE ORAL at 05:31

## 2022-01-01 RX ADMIN — Medication 5.34 MICROGRAM(S)/KG/MIN: at 09:36

## 2022-01-01 RX ADMIN — SODIUM CHLORIDE 100 MILLILITER(S): 9 INJECTION, SOLUTION INTRAVENOUS at 08:55

## 2022-01-01 RX ADMIN — HEPARIN SODIUM 5000 UNIT(S): 5000 INJECTION INTRAVENOUS; SUBCUTANEOUS at 21:24

## 2022-01-01 RX ADMIN — Medication 650 MILLIGRAM(S): at 18:30

## 2022-01-01 RX ADMIN — Medication 125 MICROGRAM(S): at 05:31

## 2022-01-01 RX ADMIN — SEVELAMER CARBONATE 800 MILLIGRAM(S): 2400 POWDER, FOR SUSPENSION ORAL at 12:03

## 2022-01-01 RX ADMIN — HEPARIN SODIUM 5000 UNIT(S): 5000 INJECTION INTRAVENOUS; SUBCUTANEOUS at 13:54

## 2022-01-01 RX ADMIN — Medication 3 MILLILITER(S): at 08:57

## 2022-01-01 RX ADMIN — Medication 650 MILLIGRAM(S): at 10:18

## 2022-01-01 RX ADMIN — Medication 57.5 MILLIGRAM(S): at 20:08

## 2022-01-01 RX ADMIN — Medication 3 MILLILITER(S): at 09:59

## 2022-01-01 RX ADMIN — Medication 125 MICROGRAM(S): at 06:13

## 2022-01-01 RX ADMIN — PANTOPRAZOLE SODIUM 40 MILLIGRAM(S): 20 TABLET, DELAYED RELEASE ORAL at 12:03

## 2022-01-01 RX ADMIN — MUPIROCIN 1 APPLICATION(S): 20 OINTMENT TOPICAL at 17:12

## 2022-01-01 RX ADMIN — ALBUTEROL 2.5 MILLIGRAM(S): 90 AEROSOL, METERED ORAL at 03:39

## 2022-01-01 RX ADMIN — SEVELAMER CARBONATE 800 MILLIGRAM(S): 2400 POWDER, FOR SUSPENSION ORAL at 14:01

## 2022-01-01 RX ADMIN — Medication 250 MILLIGRAM(S): at 07:04

## 2022-01-01 RX ADMIN — TAMSULOSIN HYDROCHLORIDE 0.4 MILLIGRAM(S): 0.4 CAPSULE ORAL at 21:42

## 2022-01-01 RX ADMIN — Medication 57.5 MILLIGRAM(S): at 23:08

## 2022-01-01 RX ADMIN — CHLORHEXIDINE GLUCONATE 1 APPLICATION(S): 213 SOLUTION TOPICAL at 05:08

## 2022-01-01 RX ADMIN — Medication 50 MILLIGRAM(S): at 05:01

## 2022-01-01 RX ADMIN — PROPOFOL 6.83 MICROGRAM(S)/KG/MIN: 10 INJECTION, EMULSION INTRAVENOUS at 05:01

## 2022-01-01 RX ADMIN — Medication 57.5 MILLIGRAM(S): at 13:55

## 2022-01-01 RX ADMIN — Medication 650 MILLIGRAM(S): at 17:08

## 2022-01-01 RX ADMIN — DIVALPROEX SODIUM 500 MILLIGRAM(S): 500 TABLET, DELAYED RELEASE ORAL at 17:37

## 2022-01-01 RX ADMIN — HEPARIN SODIUM 5000 UNIT(S): 5000 INJECTION INTRAVENOUS; SUBCUTANEOUS at 14:08

## 2022-01-01 RX ADMIN — Medication 3 MILLILITER(S): at 08:25

## 2022-01-01 RX ADMIN — CHLORHEXIDINE GLUCONATE 1 APPLICATION(S): 213 SOLUTION TOPICAL at 05:35

## 2022-01-01 RX ADMIN — LIDOCAINE 1 PATCH: 4 CREAM TOPICAL at 23:51

## 2022-01-01 RX ADMIN — Medication 1 MILLIGRAM(S): at 22:29

## 2022-01-01 RX ADMIN — RISPERIDONE 2 MILLIGRAM(S): 4 TABLET ORAL at 11:19

## 2022-01-01 RX ADMIN — CHLORHEXIDINE GLUCONATE 1 APPLICATION(S): 213 SOLUTION TOPICAL at 05:31

## 2022-01-01 RX ADMIN — SENNA PLUS 2 TABLET(S): 8.6 TABLET ORAL at 21:28

## 2022-01-01 RX ADMIN — Medication 325 MILLIGRAM(S): at 12:49

## 2022-01-01 RX ADMIN — POLYETHYLENE GLYCOL 3350 17 GRAM(S): 17 POWDER, FOR SOLUTION ORAL at 22:29

## 2022-01-01 RX ADMIN — CEFTRIAXONE 100 MILLIGRAM(S): 500 INJECTION, POWDER, FOR SOLUTION INTRAMUSCULAR; INTRAVENOUS at 17:48

## 2022-01-01 RX ADMIN — ALBUTEROL 2 PUFF(S): 90 AEROSOL, METERED ORAL at 03:24

## 2022-01-01 RX ADMIN — Medication 1 APPLICATION(S): at 05:09

## 2022-01-01 RX ADMIN — FINASTERIDE 5 MILLIGRAM(S): 5 TABLET, FILM COATED ORAL at 13:01

## 2022-01-01 RX ADMIN — ZINC OXIDE 1 APPLICATION(S): 200 OINTMENT TOPICAL at 05:00

## 2022-01-01 RX ADMIN — Medication 57.5 MILLIGRAM(S): at 05:20

## 2022-01-01 RX ADMIN — HEPARIN SODIUM 5000 UNIT(S): 5000 INJECTION INTRAVENOUS; SUBCUTANEOUS at 22:28

## 2022-01-01 RX ADMIN — CHLORHEXIDINE GLUCONATE 15 MILLILITER(S): 213 SOLUTION TOPICAL at 06:56

## 2022-01-01 RX ADMIN — MIDAZOLAM HYDROCHLORIDE 2 MILLIGRAM(S): 1 INJECTION, SOLUTION INTRAMUSCULAR; INTRAVENOUS at 06:46

## 2022-01-01 RX ADMIN — Medication 1000 MILLIGRAM(S): at 17:50

## 2022-01-01 RX ADMIN — Medication 1 MILLIGRAM(S): at 13:51

## 2022-01-01 RX ADMIN — Medication 125 MICROGRAM(S): at 05:08

## 2022-01-01 RX ADMIN — ERYTHROPOIETIN 8000 UNIT(S): 10000 INJECTION, SOLUTION INTRAVENOUS; SUBCUTANEOUS at 11:13

## 2022-01-01 RX ADMIN — ALBUTEROL 2 PUFF(S): 90 AEROSOL, METERED ORAL at 03:20

## 2022-01-01 RX ADMIN — HEPARIN SODIUM 5000 UNIT(S): 5000 INJECTION INTRAVENOUS; SUBCUTANEOUS at 13:10

## 2022-01-01 RX ADMIN — HEPARIN SODIUM 5000 UNIT(S): 5000 INJECTION INTRAVENOUS; SUBCUTANEOUS at 05:38

## 2022-01-01 RX ADMIN — SEVELAMER CARBONATE 800 MILLIGRAM(S): 2400 POWDER, FOR SUSPENSION ORAL at 05:22

## 2022-01-01 RX ADMIN — SODIUM CHLORIDE 100 MILLILITER(S): 9 INJECTION, SOLUTION INTRAVENOUS at 21:40

## 2022-01-01 RX ADMIN — PANTOPRAZOLE SODIUM 40 MILLIGRAM(S): 20 TABLET, DELAYED RELEASE ORAL at 12:30

## 2022-01-01 RX ADMIN — Medication 5.34 MICROGRAM(S)/KG/MIN: at 17:15

## 2022-01-01 RX ADMIN — MUPIROCIN 1 APPLICATION(S): 20 OINTMENT TOPICAL at 17:15

## 2022-01-01 RX ADMIN — ALBUTEROL 2 PUFF(S): 90 AEROSOL, METERED ORAL at 21:54

## 2022-01-01 RX ADMIN — POLYETHYLENE GLYCOL 3350 17 GRAM(S): 17 POWDER, FOR SOLUTION ORAL at 21:43

## 2022-01-01 RX ADMIN — Medication 100 MILLIGRAM(S): at 06:56

## 2022-01-01 RX ADMIN — LIDOCAINE 1 PATCH: 4 CREAM TOPICAL at 00:00

## 2022-01-01 RX ADMIN — PROPOFOL 6.83 MICROGRAM(S)/KG/MIN: 10 INJECTION, EMULSION INTRAVENOUS at 05:20

## 2022-01-01 RX ADMIN — SEVELAMER CARBONATE 800 MILLIGRAM(S): 2400 POWDER, FOR SUSPENSION ORAL at 21:56

## 2022-01-01 RX ADMIN — PANTOPRAZOLE SODIUM 40 MILLIGRAM(S): 20 TABLET, DELAYED RELEASE ORAL at 12:27

## 2022-01-01 RX ADMIN — CHLORHEXIDINE GLUCONATE 1 APPLICATION(S): 213 SOLUTION TOPICAL at 06:27

## 2022-01-01 RX ADMIN — LEVETIRACETAM 420 MILLIGRAM(S): 250 TABLET, FILM COATED ORAL at 17:09

## 2022-01-01 RX ADMIN — Medication 1 MILLIGRAM(S): at 11:11

## 2022-01-01 RX ADMIN — CHLORHEXIDINE GLUCONATE 15 MILLILITER(S): 213 SOLUTION TOPICAL at 17:47

## 2022-01-01 RX ADMIN — HEPARIN SODIUM 5000 UNIT(S): 5000 INJECTION INTRAVENOUS; SUBCUTANEOUS at 13:06

## 2022-01-01 RX ADMIN — Medication 400 MILLIGRAM(S): at 07:16

## 2022-01-01 RX ADMIN — SODIUM CHLORIDE 75 MILLILITER(S): 9 INJECTION, SOLUTION INTRAVENOUS at 15:20

## 2022-01-01 RX ADMIN — DEXMEDETOMIDINE HYDROCHLORIDE IN 0.9% SODIUM CHLORIDE 3.63 MICROGRAM(S)/KG/HR: 4 INJECTION INTRAVENOUS at 10:22

## 2022-01-01 RX ADMIN — Medication 50 MILLIGRAM(S): at 05:05

## 2022-01-01 RX ADMIN — PANTOPRAZOLE SODIUM 40 MILLIGRAM(S): 20 TABLET, DELAYED RELEASE ORAL at 11:51

## 2022-01-01 RX ADMIN — Medication 1 APPLICATION(S): at 06:27

## 2022-01-01 RX ADMIN — SEVELAMER CARBONATE 800 MILLIGRAM(S): 2400 POWDER, FOR SUSPENSION ORAL at 17:41

## 2022-01-01 RX ADMIN — Medication 57.5 MILLIGRAM(S): at 12:27

## 2022-01-01 RX ADMIN — Medication 5.34 MICROGRAM(S)/KG/MIN: at 19:06

## 2022-01-01 RX ADMIN — Medication 50 MILLIGRAM(S): at 00:23

## 2022-01-01 RX ADMIN — Medication 500 MILLIGRAM(S): at 13:18

## 2022-01-01 RX ADMIN — CHLORHEXIDINE GLUCONATE 15 MILLILITER(S): 213 SOLUTION TOPICAL at 05:02

## 2022-01-01 RX ADMIN — CHLORHEXIDINE GLUCONATE 1 APPLICATION(S): 213 SOLUTION TOPICAL at 05:32

## 2022-01-01 RX ADMIN — Medication 57.5 MILLIGRAM(S): at 17:20

## 2022-01-01 RX ADMIN — Medication 325 MILLIGRAM(S): at 13:02

## 2022-01-01 RX ADMIN — FINASTERIDE 5 MILLIGRAM(S): 5 TABLET, FILM COATED ORAL at 12:26

## 2022-01-01 RX ADMIN — PANTOPRAZOLE SODIUM 40 MILLIGRAM(S): 20 TABLET, DELAYED RELEASE ORAL at 11:11

## 2022-01-01 RX ADMIN — Medication 3 MILLILITER(S): at 03:11

## 2022-01-01 RX ADMIN — SEVELAMER CARBONATE 800 MILLIGRAM(S): 2400 POWDER, FOR SUSPENSION ORAL at 05:15

## 2022-01-01 RX ADMIN — Medication 57.5 MILLIGRAM(S): at 05:09

## 2022-01-01 RX ADMIN — Medication 57.5 MILLIGRAM(S): at 11:03

## 2022-01-01 RX ADMIN — SEVELAMER CARBONATE 800 MILLIGRAM(S): 2400 POWDER, FOR SUSPENSION ORAL at 13:54

## 2022-01-01 RX ADMIN — PANTOPRAZOLE SODIUM 40 MILLIGRAM(S): 20 TABLET, DELAYED RELEASE ORAL at 11:16

## 2022-01-01 RX ADMIN — Medication 40 MILLIEQUIVALENT(S): at 05:18

## 2022-01-01 RX ADMIN — Medication 3 MILLILITER(S): at 20:52

## 2022-01-01 RX ADMIN — CEFEPIME 100 MILLIGRAM(S): 1 INJECTION, POWDER, FOR SOLUTION INTRAMUSCULAR; INTRAVENOUS at 05:41

## 2022-01-01 RX ADMIN — Medication 27.2 MICROGRAM(S)/KG/MIN: at 17:36

## 2022-01-01 RX ADMIN — SEVELAMER CARBONATE 800 MILLIGRAM(S): 2400 POWDER, FOR SUSPENSION ORAL at 18:33

## 2022-01-01 RX ADMIN — BUPROPION HYDROCHLORIDE 150 MILLIGRAM(S): 150 TABLET, EXTENDED RELEASE ORAL at 17:37

## 2022-01-01 RX ADMIN — LIDOCAINE 1 PATCH: 4 CREAM TOPICAL at 05:35

## 2022-01-01 RX ADMIN — CHLORHEXIDINE GLUCONATE 1 APPLICATION(S): 213 SOLUTION TOPICAL at 06:19

## 2022-01-01 RX ADMIN — SEVELAMER CARBONATE 800 MILLIGRAM(S): 2400 POWDER, FOR SUSPENSION ORAL at 22:30

## 2022-01-01 RX ADMIN — Medication 150 MILLIGRAM(S): at 23:27

## 2022-01-01 RX ADMIN — Medication 325 MILLIGRAM(S): at 11:19

## 2022-01-01 RX ADMIN — KETAMINE HYDROCHLORIDE 100 MILLIGRAM(S): 100 INJECTION INTRAMUSCULAR; INTRAVENOUS at 22:02

## 2022-01-01 RX ADMIN — SEVELAMER CARBONATE 800 MILLIGRAM(S): 2400 POWDER, FOR SUSPENSION ORAL at 13:09

## 2022-01-01 RX ADMIN — Medication 325 MILLIGRAM(S): at 06:28

## 2022-01-01 RX ADMIN — Medication 400 MILLIGRAM(S): at 22:41

## 2022-01-01 RX ADMIN — Medication 750 MILLIGRAM(S): at 23:11

## 2022-01-01 RX ADMIN — CALCITRIOL 0.25 MICROGRAM(S): 0.5 CAPSULE ORAL at 11:50

## 2022-01-01 RX ADMIN — TAMSULOSIN HYDROCHLORIDE 0.4 MILLIGRAM(S): 0.4 CAPSULE ORAL at 22:31

## 2022-01-01 RX ADMIN — CHLORHEXIDINE GLUCONATE 15 MILLILITER(S): 213 SOLUTION TOPICAL at 05:05

## 2022-01-01 RX ADMIN — Medication 3 MILLILITER(S): at 15:49

## 2022-01-01 RX ADMIN — Medication 3 MILLILITER(S): at 03:51

## 2022-01-01 RX ADMIN — AZITHROMYCIN 255 MILLIGRAM(S): 500 TABLET, FILM COATED ORAL at 17:48

## 2022-01-01 RX ADMIN — Medication 5.34 MICROGRAM(S)/KG/MIN: at 00:53

## 2022-01-01 RX ADMIN — BUPROPION HYDROCHLORIDE 150 MILLIGRAM(S): 150 TABLET, EXTENDED RELEASE ORAL at 13:01

## 2022-01-01 RX ADMIN — SEVELAMER CARBONATE 800 MILLIGRAM(S): 2400 POWDER, FOR SUSPENSION ORAL at 21:40

## 2022-01-01 RX ADMIN — SEVELAMER CARBONATE 800 MILLIGRAM(S): 2400 POWDER, FOR SUSPENSION ORAL at 13:03

## 2022-01-01 RX ADMIN — Medication 125 MICROGRAM(S): at 05:40

## 2022-01-01 RX ADMIN — ALBUTEROL 2 PUFF(S): 90 AEROSOL, METERED ORAL at 04:35

## 2022-01-01 RX ADMIN — Medication 40 MILLIGRAM(S): at 05:40

## 2022-01-01 RX ADMIN — Medication 3 MILLILITER(S): at 03:21

## 2022-01-01 RX ADMIN — Medication 750 MILLIGRAM(S): at 11:25

## 2022-01-01 RX ADMIN — Medication 1 APPLICATION(S): at 17:23

## 2022-01-01 RX ADMIN — LIDOCAINE 1 PATCH: 4 CREAM TOPICAL at 21:26

## 2022-01-01 RX ADMIN — PANTOPRAZOLE SODIUM 40 MILLIGRAM(S): 20 TABLET, DELAYED RELEASE ORAL at 11:02

## 2022-01-01 RX ADMIN — HEPARIN SODIUM 5000 UNIT(S): 5000 INJECTION INTRAVENOUS; SUBCUTANEOUS at 05:21

## 2022-01-01 RX ADMIN — HEPARIN SODIUM 5000 UNIT(S): 5000 INJECTION INTRAVENOUS; SUBCUTANEOUS at 05:09

## 2022-01-01 RX ADMIN — Medication 125 MICROGRAM(S): at 05:34

## 2022-01-01 RX ADMIN — Medication 1 APPLICATION(S): at 22:11

## 2022-01-01 RX ADMIN — ZINC OXIDE 1 APPLICATION(S): 200 OINTMENT TOPICAL at 17:09

## 2022-01-01 RX ADMIN — Medication 3 MILLILITER(S): at 03:12

## 2022-01-01 RX ADMIN — AZITHROMYCIN 500 MILLIGRAM(S): 500 TABLET, FILM COATED ORAL at 09:00

## 2022-01-01 RX ADMIN — DIVALPROEX SODIUM 500 MILLIGRAM(S): 500 TABLET, DELAYED RELEASE ORAL at 05:38

## 2022-01-01 RX ADMIN — ZINC OXIDE 1 APPLICATION(S): 200 OINTMENT TOPICAL at 05:32

## 2022-01-01 RX ADMIN — ZINC OXIDE 1 APPLICATION(S): 200 OINTMENT TOPICAL at 18:24

## 2022-01-01 RX ADMIN — Medication 750 MILLIGRAM(S): at 11:48

## 2022-01-01 RX ADMIN — Medication 100 MILLIGRAM(S): at 20:28

## 2022-01-01 RX ADMIN — RISPERIDONE 2 MILLIGRAM(S): 4 TABLET ORAL at 11:51

## 2022-01-01 RX ADMIN — Medication 50 MILLIGRAM(S): at 12:52

## 2022-01-01 RX ADMIN — SEVELAMER CARBONATE 800 MILLIGRAM(S): 2400 POWDER, FOR SUSPENSION ORAL at 06:13

## 2022-01-01 RX ADMIN — Medication 57.5 MILLIGRAM(S): at 18:15

## 2022-01-01 RX ADMIN — MIDAZOLAM HYDROCHLORIDE 4 MILLIGRAM(S): 1 INJECTION, SOLUTION INTRAMUSCULAR; INTRAVENOUS at 07:09

## 2022-01-01 RX ADMIN — SODIUM CHLORIDE 60 MILLILITER(S): 9 INJECTION, SOLUTION INTRAVENOUS at 13:06

## 2022-01-01 RX ADMIN — Medication 1 MILLIGRAM(S): at 21:56

## 2022-01-01 RX ADMIN — POLYETHYLENE GLYCOL 3350 17 GRAM(S): 17 POWDER, FOR SOLUTION ORAL at 12:48

## 2022-01-01 RX ADMIN — CHLORHEXIDINE GLUCONATE 1 APPLICATION(S): 213 SOLUTION TOPICAL at 05:36

## 2022-01-01 RX ADMIN — SEVELAMER CARBONATE 800 MILLIGRAM(S): 2400 POWDER, FOR SUSPENSION ORAL at 22:42

## 2022-01-01 RX ADMIN — Medication 325 MILLIGRAM(S): at 11:51

## 2022-01-01 RX ADMIN — SEVELAMER CARBONATE 800 MILLIGRAM(S): 2400 POWDER, FOR SUSPENSION ORAL at 18:25

## 2022-01-01 RX ADMIN — CHLORHEXIDINE GLUCONATE 1 APPLICATION(S): 213 SOLUTION TOPICAL at 05:19

## 2022-01-01 RX ADMIN — Medication 1 MILLIGRAM(S): at 13:02

## 2022-01-01 RX ADMIN — DIVALPROEX SODIUM 500 MILLIGRAM(S): 500 TABLET, DELAYED RELEASE ORAL at 05:34

## 2022-01-01 RX ADMIN — CEFEPIME 100 MILLIGRAM(S): 1 INJECTION, POWDER, FOR SOLUTION INTRAMUSCULAR; INTRAVENOUS at 17:50

## 2022-01-01 RX ADMIN — ZINC OXIDE 1 APPLICATION(S): 200 OINTMENT TOPICAL at 17:11

## 2022-01-01 RX ADMIN — SODIUM CHLORIDE 100 MILLILITER(S): 9 INJECTION, SOLUTION INTRAVENOUS at 11:12

## 2022-01-01 RX ADMIN — ZINC OXIDE 1 APPLICATION(S): 200 OINTMENT TOPICAL at 17:30

## 2022-01-01 RX ADMIN — Medication 30 MILLIGRAM(S): at 05:38

## 2022-01-01 RX ADMIN — Medication 325 MILLIGRAM(S): at 18:02

## 2022-01-01 RX ADMIN — HEPARIN SODIUM 5000 UNIT(S): 5000 INJECTION INTRAVENOUS; SUBCUTANEOUS at 21:55

## 2022-01-01 RX ADMIN — SENNA PLUS 2 TABLET(S): 8.6 TABLET ORAL at 22:42

## 2022-01-01 RX ADMIN — RISPERIDONE 2 MILLIGRAM(S): 4 TABLET ORAL at 12:48

## 2022-01-01 RX ADMIN — Medication 3 MILLILITER(S): at 09:15

## 2022-01-01 RX ADMIN — FENTANYL CITRATE 5.7 MICROGRAM(S)/KG/HR: 50 INJECTION INTRAVENOUS at 12:49

## 2022-01-01 RX ADMIN — CHLORHEXIDINE GLUCONATE 1 APPLICATION(S): 213 SOLUTION TOPICAL at 05:05

## 2022-01-01 RX ADMIN — SEVELAMER CARBONATE 800 MILLIGRAM(S): 2400 POWDER, FOR SUSPENSION ORAL at 05:30

## 2022-01-01 RX ADMIN — Medication 2 MILLIGRAM(S): at 13:58

## 2022-01-01 RX ADMIN — Medication 125 MICROGRAM(S): at 05:43

## 2022-01-01 RX ADMIN — Medication 3 MILLILITER(S): at 20:20

## 2022-01-01 RX ADMIN — Medication 125 MICROGRAM(S): at 05:10

## 2022-01-01 RX ADMIN — PANTOPRAZOLE SODIUM 40 MILLIGRAM(S): 20 TABLET, DELAYED RELEASE ORAL at 06:42

## 2022-01-01 RX ADMIN — HEPARIN SODIUM 5000 UNIT(S): 5000 INJECTION INTRAVENOUS; SUBCUTANEOUS at 15:06

## 2022-01-01 RX ADMIN — CEFTRIAXONE 100 MILLIGRAM(S): 500 INJECTION, POWDER, FOR SOLUTION INTRAMUSCULAR; INTRAVENOUS at 11:32

## 2022-01-01 RX ADMIN — TAMSULOSIN HYDROCHLORIDE 0.4 MILLIGRAM(S): 0.4 CAPSULE ORAL at 21:29

## 2022-01-01 RX ADMIN — AMIODARONE HYDROCHLORIDE 600 MILLIGRAM(S): 400 TABLET ORAL at 17:40

## 2022-01-01 RX ADMIN — MUPIROCIN 1 APPLICATION(S): 20 OINTMENT TOPICAL at 18:31

## 2022-01-01 RX ADMIN — MUPIROCIN 1 APPLICATION(S): 20 OINTMENT TOPICAL at 17:08

## 2022-01-01 RX ADMIN — DEXMEDETOMIDINE HYDROCHLORIDE IN 0.9% SODIUM CHLORIDE 8.54 MICROGRAM(S)/KG/HR: 4 INJECTION INTRAVENOUS at 10:22

## 2022-01-01 RX ADMIN — LEVETIRACETAM 420 MILLIGRAM(S): 250 TABLET, FILM COATED ORAL at 05:20

## 2022-01-01 RX ADMIN — TAMSULOSIN HYDROCHLORIDE 0.4 MILLIGRAM(S): 0.4 CAPSULE ORAL at 21:43

## 2022-01-01 RX ADMIN — Medication 50 MILLIGRAM(S): at 17:09

## 2022-01-01 RX ADMIN — Medication 1 MILLIGRAM(S): at 11:19

## 2022-01-01 RX ADMIN — PANTOPRAZOLE SODIUM 40 MILLIGRAM(S): 20 TABLET, DELAYED RELEASE ORAL at 12:48

## 2022-01-01 RX ADMIN — RISPERIDONE 2 MILLIGRAM(S): 4 TABLET ORAL at 22:10

## 2022-01-01 RX ADMIN — HEPARIN SODIUM 5000 UNIT(S): 5000 INJECTION INTRAVENOUS; SUBCUTANEOUS at 14:26

## 2022-01-01 RX ADMIN — Medication 3 MILLILITER(S): at 15:01

## 2022-01-01 RX ADMIN — Medication 1 MILLIGRAM(S): at 22:48

## 2022-01-01 RX ADMIN — CHLORHEXIDINE GLUCONATE 1 APPLICATION(S): 213 SOLUTION TOPICAL at 05:20

## 2022-01-01 RX ADMIN — HEPARIN SODIUM 5000 UNIT(S): 5000 INJECTION INTRAVENOUS; SUBCUTANEOUS at 05:10

## 2022-01-01 RX ADMIN — RISPERIDONE 2 MILLIGRAM(S): 4 TABLET ORAL at 12:26

## 2022-01-01 RX ADMIN — HEPARIN SODIUM 5000 UNIT(S): 5000 INJECTION INTRAVENOUS; SUBCUTANEOUS at 05:08

## 2022-01-01 RX ADMIN — CALCITRIOL 0.25 MICROGRAM(S): 0.5 CAPSULE ORAL at 11:19

## 2022-01-01 RX ADMIN — HEPARIN SODIUM 5000 UNIT(S): 5000 INJECTION INTRAVENOUS; SUBCUTANEOUS at 13:04

## 2022-01-01 RX ADMIN — Medication 125 MICROGRAM(S): at 05:15

## 2022-01-01 RX ADMIN — PANTOPRAZOLE SODIUM 40 MILLIGRAM(S): 20 TABLET, DELAYED RELEASE ORAL at 05:17

## 2022-01-01 RX ADMIN — CEFTRIAXONE 100 MILLIGRAM(S): 500 INJECTION, POWDER, FOR SOLUTION INTRAMUSCULAR; INTRAVENOUS at 12:28

## 2022-01-01 RX ADMIN — Medication 125 MICROGRAM(S): at 05:20

## 2022-01-01 RX ADMIN — Medication 750 MILLIGRAM(S): at 17:01

## 2022-01-01 RX ADMIN — Medication 100 MILLIEQUIVALENT(S): at 08:12

## 2022-01-01 RX ADMIN — Medication 975 MILLIGRAM(S): at 09:42

## 2022-01-01 RX ADMIN — CALCITRIOL 0.25 MICROGRAM(S): 0.5 CAPSULE ORAL at 13:01

## 2022-01-01 RX ADMIN — HEPARIN SODIUM 5000 UNIT(S): 5000 INJECTION INTRAVENOUS; SUBCUTANEOUS at 22:10

## 2022-01-01 RX ADMIN — Medication 57.5 MILLIGRAM(S): at 07:15

## 2022-01-01 RX ADMIN — CEFTRIAXONE 100 MILLIGRAM(S): 500 INJECTION, POWDER, FOR SOLUTION INTRAMUSCULAR; INTRAVENOUS at 11:34

## 2022-01-01 RX ADMIN — SEVELAMER CARBONATE 800 MILLIGRAM(S): 2400 POWDER, FOR SUSPENSION ORAL at 21:29

## 2022-01-01 RX ADMIN — MUPIROCIN 1 APPLICATION(S): 20 OINTMENT TOPICAL at 05:48

## 2022-01-01 RX ADMIN — Medication 750 MILLIGRAM(S): at 17:31

## 2022-01-01 RX ADMIN — Medication 650 MILLIGRAM(S): at 17:44

## 2022-01-01 RX ADMIN — BUPROPION HYDROCHLORIDE 150 MILLIGRAM(S): 150 TABLET, EXTENDED RELEASE ORAL at 11:20

## 2022-01-01 RX ADMIN — Medication 3 MILLILITER(S): at 02:58

## 2022-01-01 RX ADMIN — LEVETIRACETAM 400 MILLIGRAM(S): 250 TABLET, FILM COATED ORAL at 14:04

## 2022-01-01 RX ADMIN — Medication 50 MILLIGRAM(S): at 17:49

## 2022-01-01 RX ADMIN — SODIUM CHLORIDE 75 MILLILITER(S): 9 INJECTION, SOLUTION INTRAVENOUS at 05:19

## 2022-01-01 RX ADMIN — HEPARIN SODIUM 5000 UNIT(S): 5000 INJECTION INTRAVENOUS; SUBCUTANEOUS at 05:20

## 2022-01-01 RX ADMIN — SODIUM CHLORIDE 500 MILLILITER(S): 9 INJECTION INTRAMUSCULAR; INTRAVENOUS; SUBCUTANEOUS at 15:55

## 2022-01-01 RX ADMIN — Medication 125 MICROGRAM(S): at 05:35

## 2022-01-01 RX ADMIN — HEPARIN SODIUM 5000 UNIT(S): 5000 INJECTION INTRAVENOUS; SUBCUTANEOUS at 21:12

## 2022-01-01 RX ADMIN — CEFEPIME 100 MILLIGRAM(S): 1 INJECTION, POWDER, FOR SOLUTION INTRAMUSCULAR; INTRAVENOUS at 05:36

## 2022-01-01 RX ADMIN — Medication 650 MILLIGRAM(S): at 04:27

## 2022-01-01 RX ADMIN — DIVALPROEX SODIUM 500 MILLIGRAM(S): 500 TABLET, DELAYED RELEASE ORAL at 17:57

## 2022-01-01 RX ADMIN — CHLORHEXIDINE GLUCONATE 1 APPLICATION(S): 213 SOLUTION TOPICAL at 19:02

## 2022-01-01 RX ADMIN — PANTOPRAZOLE SODIUM 40 MILLIGRAM(S): 20 TABLET, DELAYED RELEASE ORAL at 13:16

## 2022-01-01 RX ADMIN — CHLORHEXIDINE GLUCONATE 1 APPLICATION(S): 213 SOLUTION TOPICAL at 05:11

## 2022-01-01 RX ADMIN — Medication 40 MILLIEQUIVALENT(S): at 05:30

## 2022-01-01 RX ADMIN — ALBUTEROL 2.5 MILLIGRAM(S): 90 AEROSOL, METERED ORAL at 08:17

## 2022-01-01 RX ADMIN — ALBUTEROL 2.5 MILLIGRAM(S): 90 AEROSOL, METERED ORAL at 20:04

## 2022-01-01 RX ADMIN — LIDOCAINE 1 PATCH: 4 CREAM TOPICAL at 13:03

## 2022-01-01 RX ADMIN — ZINC OXIDE 1 APPLICATION(S): 200 OINTMENT TOPICAL at 05:31

## 2022-01-01 RX ADMIN — CHLORHEXIDINE GLUCONATE 2 APPLICATION(S): 213 SOLUTION TOPICAL at 17:38

## 2022-01-01 RX ADMIN — PANTOPRAZOLE SODIUM 40 MILLIGRAM(S): 20 TABLET, DELAYED RELEASE ORAL at 05:37

## 2022-01-01 RX ADMIN — HEPARIN SODIUM 5000 UNIT(S): 5000 INJECTION INTRAVENOUS; SUBCUTANEOUS at 21:43

## 2022-01-01 RX ADMIN — MIDAZOLAM HYDROCHLORIDE 4 MILLIGRAM(S): 1 INJECTION, SOLUTION INTRAMUSCULAR; INTRAVENOUS at 11:59

## 2022-01-01 RX ADMIN — Medication 3 MILLILITER(S): at 20:12

## 2022-01-01 RX ADMIN — Medication 325 MILLIGRAM(S): at 17:47

## 2022-01-01 RX ADMIN — Medication 1000 MILLIGRAM(S): at 07:44

## 2022-01-01 RX ADMIN — ALBUTEROL 2 PUFF(S): 90 AEROSOL, METERED ORAL at 20:22

## 2022-01-01 RX ADMIN — DOPAMINE HYDROCHLORIDE 10.7 MICROGRAM(S)/KG/MIN: 40 INJECTION, SOLUTION, CONCENTRATE INTRAVENOUS at 21:31

## 2022-01-01 RX ADMIN — Medication 55 MILLIGRAM(S): at 06:47

## 2022-01-01 RX ADMIN — ALBUTEROL 2 PUFF(S): 90 AEROSOL, METERED ORAL at 09:52

## 2022-01-01 RX ADMIN — MIDODRINE HYDROCHLORIDE 10 MILLIGRAM(S): 2.5 TABLET ORAL at 21:36

## 2022-01-01 RX ADMIN — Medication 3 MILLILITER(S): at 14:37

## 2022-01-01 RX ADMIN — Medication 250 MILLIGRAM(S): at 17:47

## 2022-01-01 RX ADMIN — POLYETHYLENE GLYCOL 3350 17 GRAM(S): 17 POWDER, FOR SOLUTION ORAL at 11:19

## 2022-01-01 RX ADMIN — Medication 650 MILLIGRAM(S): at 06:13

## 2022-01-01 RX ADMIN — CEFEPIME 100 MILLIGRAM(S): 1 INJECTION, POWDER, FOR SOLUTION INTRAMUSCULAR; INTRAVENOUS at 05:08

## 2022-01-01 RX ADMIN — CHLORHEXIDINE GLUCONATE 2 APPLICATION(S): 213 SOLUTION TOPICAL at 17:45

## 2022-01-01 RX ADMIN — RISPERIDONE 2 MILLIGRAM(S): 4 TABLET ORAL at 21:41

## 2022-01-01 RX ADMIN — PANTOPRAZOLE SODIUM 40 MILLIGRAM(S): 20 TABLET, DELAYED RELEASE ORAL at 12:49

## 2022-01-01 RX ADMIN — Medication 1 APPLICATION(S): at 05:31

## 2022-01-01 RX ADMIN — Medication 3 MILLILITER(S): at 20:03

## 2022-01-01 RX ADMIN — CHLORHEXIDINE GLUCONATE 1 APPLICATION(S): 213 SOLUTION TOPICAL at 05:18

## 2022-01-01 RX ADMIN — PANTOPRAZOLE SODIUM 40 MILLIGRAM(S): 20 TABLET, DELAYED RELEASE ORAL at 06:59

## 2022-01-01 RX ADMIN — ZINC OXIDE 1 APPLICATION(S): 200 OINTMENT TOPICAL at 22:16

## 2022-01-01 RX ADMIN — Medication 3 MILLILITER(S): at 03:35

## 2022-01-01 RX ADMIN — HEPARIN SODIUM 5000 UNIT(S): 5000 INJECTION INTRAVENOUS; SUBCUTANEOUS at 21:28

## 2022-01-01 RX ADMIN — CEFTRIAXONE 1000 MILLIGRAM(S): 500 INJECTION, POWDER, FOR SOLUTION INTRAMUSCULAR; INTRAVENOUS at 08:30

## 2022-01-01 RX ADMIN — CEFTRIAXONE 100 MILLIGRAM(S): 500 INJECTION, POWDER, FOR SOLUTION INTRAMUSCULAR; INTRAVENOUS at 20:28

## 2022-01-01 RX ADMIN — DIVALPROEX SODIUM 500 MILLIGRAM(S): 500 TABLET, DELAYED RELEASE ORAL at 05:41

## 2022-01-01 RX ADMIN — Medication 1 MILLIGRAM(S): at 11:51

## 2022-01-01 RX ADMIN — HEPARIN SODIUM 5000 UNIT(S): 5000 INJECTION INTRAVENOUS; SUBCUTANEOUS at 06:56

## 2022-01-01 RX ADMIN — SODIUM CHLORIDE 4500 MILLILITER(S): 9 INJECTION INTRAMUSCULAR; INTRAVENOUS; SUBCUTANEOUS at 09:01

## 2022-01-01 RX ADMIN — Medication 50 MILLIGRAM(S): at 06:56

## 2022-01-01 RX ADMIN — ZINC OXIDE 1 APPLICATION(S): 200 OINTMENT TOPICAL at 17:23

## 2022-01-01 RX ADMIN — CEFTRIAXONE 100 MILLIGRAM(S): 500 INJECTION, POWDER, FOR SOLUTION INTRAMUSCULAR; INTRAVENOUS at 08:00

## 2022-01-01 RX ADMIN — Medication 500 MILLIGRAM(S): at 05:35

## 2022-01-01 RX ADMIN — Medication 250 MILLIGRAM(S): at 12:27

## 2022-01-01 RX ADMIN — SEVELAMER CARBONATE 800 MILLIGRAM(S): 2400 POWDER, FOR SUSPENSION ORAL at 13:17

## 2022-01-01 RX ADMIN — HEPARIN SODIUM 5000 UNIT(S): 5000 INJECTION INTRAVENOUS; SUBCUTANEOUS at 21:40

## 2022-01-01 RX ADMIN — RISPERIDONE 2 MILLIGRAM(S): 4 TABLET ORAL at 11:11

## 2022-01-01 RX ADMIN — ZINC OXIDE 1 APPLICATION(S): 200 OINTMENT TOPICAL at 05:20

## 2022-01-01 RX ADMIN — PANTOPRAZOLE SODIUM 40 MILLIGRAM(S): 20 TABLET, DELAYED RELEASE ORAL at 12:17

## 2022-01-01 RX ADMIN — SEVELAMER CARBONATE 800 MILLIGRAM(S): 2400 POWDER, FOR SUSPENSION ORAL at 13:50

## 2022-01-01 RX ADMIN — MUPIROCIN 1 APPLICATION(S): 20 OINTMENT TOPICAL at 17:37

## 2022-01-01 RX ADMIN — CALCITRIOL 0.25 MICROGRAM(S): 0.5 CAPSULE ORAL at 11:10

## 2022-01-01 RX ADMIN — SIMETHICONE 80 MILLIGRAM(S): 80 TABLET, CHEWABLE ORAL at 05:35

## 2022-01-01 RX ADMIN — Medication 100 MILLIEQUIVALENT(S): at 06:58

## 2022-01-01 RX ADMIN — FINASTERIDE 5 MILLIGRAM(S): 5 TABLET, FILM COATED ORAL at 11:19

## 2022-01-01 RX ADMIN — HEPARIN SODIUM 5000 UNIT(S): 5000 INJECTION INTRAVENOUS; SUBCUTANEOUS at 05:43

## 2022-01-01 RX ADMIN — Medication 1 APPLICATION(S): at 06:14

## 2022-01-01 RX ADMIN — Medication 1 APPLICATION(S): at 17:11

## 2022-01-01 RX ADMIN — CEFTRIAXONE 100 MILLIGRAM(S): 500 INJECTION, POWDER, FOR SOLUTION INTRAMUSCULAR; INTRAVENOUS at 12:50

## 2022-01-01 RX ADMIN — PANTOPRAZOLE SODIUM 40 MILLIGRAM(S): 20 TABLET, DELAYED RELEASE ORAL at 11:52

## 2022-01-01 RX ADMIN — Medication 500 MILLIGRAM(S): at 13:57

## 2022-01-01 RX ADMIN — ALBUTEROL 2.5 MILLIGRAM(S): 90 AEROSOL, METERED ORAL at 20:24

## 2022-01-01 RX ADMIN — ALBUTEROL 2 PUFF(S): 90 AEROSOL, METERED ORAL at 09:55

## 2022-01-01 RX ADMIN — ZINC OXIDE 1 APPLICATION(S): 200 OINTMENT TOPICAL at 05:19

## 2022-01-01 RX ADMIN — SEVELAMER CARBONATE 800 MILLIGRAM(S): 2400 POWDER, FOR SUSPENSION ORAL at 05:36

## 2022-01-01 RX ADMIN — Medication 1 APPLICATION(S): at 18:25

## 2022-01-01 RX ADMIN — Medication 57.5 MILLIGRAM(S): at 17:31

## 2022-01-01 RX ADMIN — Medication 3 MILLILITER(S): at 20:27

## 2022-01-01 RX ADMIN — Medication 750 MILLIGRAM(S): at 05:10

## 2022-01-01 RX ADMIN — MUPIROCIN 1 APPLICATION(S): 20 OINTMENT TOPICAL at 17:48

## 2022-01-01 RX ADMIN — Medication 10 MILLIGRAM(S): at 15:15

## 2022-01-01 RX ADMIN — HEPARIN SODIUM 5000 UNIT(S): 5000 INJECTION INTRAVENOUS; SUBCUTANEOUS at 22:42

## 2022-01-01 RX ADMIN — PANTOPRAZOLE SODIUM 40 MILLIGRAM(S): 20 TABLET, DELAYED RELEASE ORAL at 11:24

## 2022-01-01 RX ADMIN — BUPROPION HYDROCHLORIDE 75 MILLIGRAM(S): 150 TABLET, EXTENDED RELEASE ORAL at 18:25

## 2022-01-01 RX ADMIN — Medication 1 APPLICATION(S): at 17:09

## 2022-01-01 RX ADMIN — Medication 57.5 MILLIGRAM(S): at 05:37

## 2022-01-01 RX ADMIN — Medication 57.5 MILLIGRAM(S): at 05:23

## 2022-01-01 RX ADMIN — LIDOCAINE 1 PATCH: 4 CREAM TOPICAL at 18:31

## 2022-01-01 RX ADMIN — ZINC OXIDE 1 APPLICATION(S): 200 OINTMENT TOPICAL at 18:36

## 2022-01-01 RX ADMIN — SODIUM CHLORIDE 75 MILLILITER(S): 9 INJECTION, SOLUTION INTRAVENOUS at 09:40

## 2022-01-01 RX ADMIN — SEVELAMER CARBONATE 800 MILLIGRAM(S): 2400 POWDER, FOR SUSPENSION ORAL at 05:40

## 2022-01-01 RX ADMIN — Medication 650 MILLIGRAM(S): at 00:11

## 2022-01-01 RX ADMIN — DIVALPROEX SODIUM 500 MILLIGRAM(S): 500 TABLET, DELAYED RELEASE ORAL at 05:39

## 2022-01-01 RX ADMIN — LIDOCAINE 1 PATCH: 4 CREAM TOPICAL at 18:53

## 2022-01-01 RX ADMIN — Medication 1 APPLICATION(S): at 17:30

## 2022-01-01 RX ADMIN — Medication 57.5 MILLIGRAM(S): at 17:39

## 2022-01-01 RX ADMIN — ALBUTEROL 2.5 MILLIGRAM(S): 90 AEROSOL, METERED ORAL at 14:50

## 2022-01-01 RX ADMIN — SENNA PLUS 10 MILLILITER(S): 8.6 TABLET ORAL at 21:43

## 2022-02-14 ENCOUNTER — INPATIENT (INPATIENT)
Facility: HOSPITAL | Age: 62
LOS: 3 days | Discharge: NURSING, REHAB OR LONG-TERM CARE | DRG: 291 | End: 2022-02-18
Attending: INTERNAL MEDICINE | Admitting: INTERNAL MEDICINE
Payer: MEDICAID

## 2022-02-14 VITALS
SYSTOLIC BLOOD PRESSURE: 107 MMHG | OXYGEN SATURATION: 98 % | RESPIRATION RATE: 17 BRPM | DIASTOLIC BLOOD PRESSURE: 62 MMHG | HEIGHT: 68 IN | WEIGHT: 302.03 LBS | TEMPERATURE: 99 F | HEART RATE: 105 BPM

## 2022-02-14 DIAGNOSIS — Z87.09 PERSONAL HISTORY OF OTHER DISEASES OF THE RESPIRATORY SYSTEM: ICD-10-CM

## 2022-02-14 DIAGNOSIS — J96.01 ACUTE RESPIRATORY FAILURE WITH HYPOXIA: ICD-10-CM

## 2022-02-14 DIAGNOSIS — I50.9 HEART FAILURE, UNSPECIFIED: ICD-10-CM

## 2022-02-14 DIAGNOSIS — Z29.9 ENCOUNTER FOR PROPHYLACTIC MEASURES, UNSPECIFIED: ICD-10-CM

## 2022-02-14 DIAGNOSIS — N18.9 CHRONIC KIDNEY DISEASE, UNSPECIFIED: ICD-10-CM

## 2022-02-14 DIAGNOSIS — E03.9 HYPOTHYROIDISM, UNSPECIFIED: ICD-10-CM

## 2022-02-14 DIAGNOSIS — J81.1 CHRONIC PULMONARY EDEMA: ICD-10-CM

## 2022-02-14 DIAGNOSIS — A41.9 SEPSIS, UNSPECIFIED ORGANISM: ICD-10-CM

## 2022-02-14 LAB
ALBUMIN SERPL ELPH-MCNC: 2.6 G/DL — LOW (ref 3.5–5)
ALP SERPL-CCNC: 93 U/L — SIGNIFICANT CHANGE UP (ref 40–120)
ALT FLD-CCNC: 10 U/L DA — SIGNIFICANT CHANGE UP (ref 10–60)
ANION GAP SERPL CALC-SCNC: 4 MMOL/L — LOW (ref 5–17)
APPEARANCE UR: CLEAR — SIGNIFICANT CHANGE UP
APTT BLD: 33.2 SEC — SIGNIFICANT CHANGE UP (ref 27.5–35.5)
AST SERPL-CCNC: 8 U/L — LOW (ref 10–40)
BASE EXCESS BLDA CALC-SCNC: 5.7 MMOL/L — HIGH (ref -2–3)
BILIRUB SERPL-MCNC: 0.2 MG/DL — SIGNIFICANT CHANGE UP (ref 0.2–1.2)
BILIRUB UR-MCNC: NEGATIVE — SIGNIFICANT CHANGE UP
BLOOD GAS COMMENTS ARTERIAL: SIGNIFICANT CHANGE UP
BUN SERPL-MCNC: 52 MG/DL — HIGH (ref 7–18)
CALCIUM SERPL-MCNC: 9.5 MG/DL — SIGNIFICANT CHANGE UP (ref 8.4–10.5)
CHLORIDE SERPL-SCNC: 103 MMOL/L — SIGNIFICANT CHANGE UP (ref 96–108)
CO2 SERPL-SCNC: 32 MMOL/L — HIGH (ref 22–31)
COLOR SPEC: YELLOW — SIGNIFICANT CHANGE UP
CREAT SERPL-MCNC: 3.82 MG/DL — HIGH (ref 0.5–1.3)
DIFF PNL FLD: ABNORMAL
GAS PNL BLDV: SIGNIFICANT CHANGE UP
GLUCOSE SERPL-MCNC: 119 MG/DL — HIGH (ref 70–99)
GLUCOSE UR QL: NEGATIVE — SIGNIFICANT CHANGE UP
HCO3 BLDA-SCNC: 34 MMOL/L — HIGH (ref 21–28)
HCT VFR BLD CALC: 30.8 % — LOW (ref 39–50)
HGB BLD-MCNC: 9.6 G/DL — LOW (ref 13–17)
HOROWITZ INDEX BLDA+IHG-RTO: 32 — SIGNIFICANT CHANGE UP
INR BLD: 1.01 RATIO — SIGNIFICANT CHANGE UP (ref 0.88–1.16)
KETONES UR-MCNC: NEGATIVE — SIGNIFICANT CHANGE UP
LACTATE SERPL-SCNC: 1.8 MMOL/L — SIGNIFICANT CHANGE UP (ref 0.7–2)
LEUKOCYTE ESTERASE UR-ACNC: NEGATIVE — SIGNIFICANT CHANGE UP
LYMPHOCYTES # BLD AUTO: 5 % — LOW (ref 13–44)
MAGNESIUM SERPL-MCNC: 2.6 MG/DL — SIGNIFICANT CHANGE UP (ref 1.6–2.6)
MCHC RBC-ENTMCNC: 31.2 GM/DL — LOW (ref 32–36)
MCHC RBC-ENTMCNC: 31.6 PG — SIGNIFICANT CHANGE UP (ref 27–34)
MCV RBC AUTO: 101.3 FL — HIGH (ref 80–100)
MONOCYTES NFR BLD AUTO: 7 % — SIGNIFICANT CHANGE UP (ref 2–14)
NEUTROPHILS NFR BLD AUTO: 83 % — HIGH (ref 43–77)
NITRITE UR-MCNC: NEGATIVE — SIGNIFICANT CHANGE UP
NT-PROBNP SERPL-SCNC: 1228 PG/ML — HIGH (ref 0–125)
PCO2 BLDA: 64 MMHG — HIGH (ref 35–48)
PH BLDA: 7.33 — LOW (ref 7.35–7.45)
PH UR: 6.5 — SIGNIFICANT CHANGE UP (ref 5–8)
PLATELET # BLD AUTO: 294 K/UL — SIGNIFICANT CHANGE UP (ref 150–400)
PO2 BLDA: 60 MMHG — LOW (ref 83–108)
POTASSIUM SERPL-MCNC: 5.1 MMOL/L — SIGNIFICANT CHANGE UP (ref 3.5–5.3)
POTASSIUM SERPL-SCNC: 5.1 MMOL/L — SIGNIFICANT CHANGE UP (ref 3.5–5.3)
PROT SERPL-MCNC: 7.7 G/DL — SIGNIFICANT CHANGE UP (ref 6–8.3)
PROT UR-MCNC: 30 MG/DL
PROTHROM AB SERPL-ACNC: 12 SEC — SIGNIFICANT CHANGE UP (ref 10.6–13.6)
RAPID RVP RESULT: SIGNIFICANT CHANGE UP
RBC # BLD: 3.04 M/UL — LOW (ref 4.2–5.8)
RBC # FLD: 14.6 % — HIGH (ref 10.3–14.5)
SAO2 % BLDA: 92 % — SIGNIFICANT CHANGE UP
SARS-COV-2 RNA SPEC QL NAA+PROBE: SIGNIFICANT CHANGE UP
SODIUM SERPL-SCNC: 139 MMOL/L — SIGNIFICANT CHANGE UP (ref 135–145)
SP GR SPEC: 1.01 — SIGNIFICANT CHANGE UP (ref 1.01–1.02)
TROPONIN I, HIGH SENSITIVITY RESULT: 21.3 NG/L — SIGNIFICANT CHANGE UP
UROBILINOGEN FLD QL: NEGATIVE — SIGNIFICANT CHANGE UP
WBC # BLD: 13.79 K/UL — HIGH (ref 3.8–10.5)
WBC # FLD AUTO: 13.79 K/UL — HIGH (ref 3.8–10.5)

## 2022-02-14 PROCEDURE — 71045 X-RAY EXAM CHEST 1 VIEW: CPT | Mod: 26

## 2022-02-14 PROCEDURE — 93010 ELECTROCARDIOGRAM REPORT: CPT

## 2022-02-14 PROCEDURE — 99291 CRITICAL CARE FIRST HOUR: CPT

## 2022-02-14 RX ORDER — FINASTERIDE 5 MG/1
5 TABLET, FILM COATED ORAL DAILY
Refills: 0 | Status: DISCONTINUED | OUTPATIENT
Start: 2022-02-14 | End: 2022-02-18

## 2022-02-14 RX ORDER — LEVOTHYROXINE SODIUM 125 MCG
125 TABLET ORAL DAILY
Refills: 0 | Status: DISCONTINUED | OUTPATIENT
Start: 2022-02-14 | End: 2022-02-18

## 2022-02-14 RX ORDER — RISPERIDONE 4 MG/1
2 TABLET ORAL DAILY
Refills: 0 | Status: DISCONTINUED | OUTPATIENT
Start: 2022-02-14 | End: 2022-02-18

## 2022-02-14 RX ORDER — FUROSEMIDE 40 MG
20 TABLET ORAL ONCE
Refills: 0 | Status: COMPLETED | OUTPATIENT
Start: 2022-02-14 | End: 2022-02-14

## 2022-02-14 RX ORDER — NICOTINE POLACRILEX 2 MG
1 GUM BUCCAL DAILY
Refills: 0 | Status: DISCONTINUED | OUTPATIENT
Start: 2022-02-14 | End: 2022-02-18

## 2022-02-14 RX ORDER — BUDESONIDE AND FORMOTEROL FUMARATE DIHYDRATE 160; 4.5 UG/1; UG/1
2 AEROSOL RESPIRATORY (INHALATION)
Refills: 0 | Status: DISCONTINUED | OUTPATIENT
Start: 2022-02-14 | End: 2022-02-18

## 2022-02-14 RX ORDER — ACETAMINOPHEN 500 MG
650 TABLET ORAL ONCE
Refills: 0 | Status: COMPLETED | OUTPATIENT
Start: 2022-02-14 | End: 2022-02-14

## 2022-02-14 RX ORDER — SEVELAMER CARBONATE 2400 MG/1
800 POWDER, FOR SUSPENSION ORAL
Refills: 0 | Status: DISCONTINUED | OUTPATIENT
Start: 2022-02-14 | End: 2022-02-18

## 2022-02-14 RX ORDER — TAMSULOSIN HYDROCHLORIDE 0.4 MG/1
0.4 CAPSULE ORAL AT BEDTIME
Refills: 0 | Status: DISCONTINUED | OUTPATIENT
Start: 2022-02-14 | End: 2022-02-18

## 2022-02-14 RX ORDER — DIVALPROEX SODIUM 500 MG/1
500 TABLET, DELAYED RELEASE ORAL
Refills: 0 | Status: DISCONTINUED | OUTPATIENT
Start: 2022-02-14 | End: 2022-02-18

## 2022-02-14 RX ORDER — SIMVASTATIN 20 MG/1
20 TABLET, FILM COATED ORAL AT BEDTIME
Refills: 0 | Status: DISCONTINUED | OUTPATIENT
Start: 2022-02-14 | End: 2022-02-18

## 2022-02-14 RX ORDER — VANCOMYCIN HCL 1 G
1000 VIAL (EA) INTRAVENOUS ONCE
Refills: 0 | Status: COMPLETED | OUTPATIENT
Start: 2022-02-14 | End: 2022-02-14

## 2022-02-14 RX ORDER — PIPERACILLIN AND TAZOBACTAM 4; .5 G/20ML; G/20ML
3.38 INJECTION, POWDER, LYOPHILIZED, FOR SOLUTION INTRAVENOUS ONCE
Refills: 0 | Status: COMPLETED | OUTPATIENT
Start: 2022-02-14 | End: 2022-02-14

## 2022-02-14 RX ORDER — PANTOPRAZOLE SODIUM 20 MG/1
40 TABLET, DELAYED RELEASE ORAL
Refills: 0 | Status: DISCONTINUED | OUTPATIENT
Start: 2022-02-14 | End: 2022-02-18

## 2022-02-14 RX ORDER — BUPROPION HYDROCHLORIDE 150 MG/1
150 TABLET, EXTENDED RELEASE ORAL DAILY
Refills: 0 | Status: DISCONTINUED | OUTPATIENT
Start: 2022-02-14 | End: 2022-02-18

## 2022-02-14 RX ORDER — ALBUTEROL 90 UG/1
2 AEROSOL, METERED ORAL EVERY 6 HOURS
Refills: 0 | Status: DISCONTINUED | OUTPATIENT
Start: 2022-02-14 | End: 2022-02-18

## 2022-02-14 RX ORDER — CALCITRIOL 0.5 UG/1
0.25 CAPSULE ORAL DAILY
Refills: 0 | Status: DISCONTINUED | OUTPATIENT
Start: 2022-02-14 | End: 2022-02-18

## 2022-02-14 RX ORDER — IPRATROPIUM/ALBUTEROL SULFATE 18-103MCG
3 AEROSOL WITH ADAPTER (GRAM) INHALATION
Refills: 0 | Status: COMPLETED | OUTPATIENT
Start: 2022-02-14 | End: 2022-02-14

## 2022-02-14 RX ORDER — MAGNESIUM HYDROXIDE 400 MG/1
30 TABLET, CHEWABLE ORAL AT BEDTIME
Refills: 0 | Status: DISCONTINUED | OUTPATIENT
Start: 2022-02-14 | End: 2022-02-18

## 2022-02-14 RX ORDER — AZITHROMYCIN 500 MG/1
500 TABLET, FILM COATED ORAL EVERY 24 HOURS
Refills: 0 | Status: DISCONTINUED | OUTPATIENT
Start: 2022-02-14 | End: 2022-02-18

## 2022-02-14 RX ORDER — CEFTRIAXONE 500 MG/1
1000 INJECTION, POWDER, FOR SOLUTION INTRAMUSCULAR; INTRAVENOUS EVERY 24 HOURS
Refills: 0 | Status: DISCONTINUED | OUTPATIENT
Start: 2022-02-14 | End: 2022-02-18

## 2022-02-14 RX ORDER — ACETAMINOPHEN 500 MG
650 TABLET ORAL EVERY 6 HOURS
Refills: 0 | Status: DISCONTINUED | OUTPATIENT
Start: 2022-02-14 | End: 2022-02-18

## 2022-02-14 RX ORDER — ASPIRIN/CALCIUM CARB/MAGNESIUM 324 MG
81 TABLET ORAL DAILY
Refills: 0 | Status: DISCONTINUED | OUTPATIENT
Start: 2022-02-14 | End: 2022-02-18

## 2022-02-14 RX ORDER — FOLIC ACID 0.8 MG
1 TABLET ORAL DAILY
Refills: 0 | Status: DISCONTINUED | OUTPATIENT
Start: 2022-02-14 | End: 2022-02-18

## 2022-02-14 RX ORDER — FERROUS SULFATE 325(65) MG
325 TABLET ORAL DAILY
Refills: 0 | Status: DISCONTINUED | OUTPATIENT
Start: 2022-02-14 | End: 2022-02-18

## 2022-02-14 RX ORDER — FUROSEMIDE 40 MG
40 TABLET ORAL ONCE
Refills: 0 | Status: COMPLETED | OUTPATIENT
Start: 2022-02-14 | End: 2022-02-14

## 2022-02-14 RX ORDER — CHOLECALCIFEROL (VITAMIN D3) 125 MCG
2000 CAPSULE ORAL DAILY
Refills: 0 | Status: DISCONTINUED | OUTPATIENT
Start: 2022-02-14 | End: 2022-02-18

## 2022-02-14 RX ADMIN — PIPERACILLIN AND TAZOBACTAM 3.38 GRAM(S): 4; .5 INJECTION, POWDER, LYOPHILIZED, FOR SOLUTION INTRAVENOUS at 19:20

## 2022-02-14 RX ADMIN — Medication 650 MILLIGRAM(S): at 18:33

## 2022-02-14 RX ADMIN — Medication 1000 MILLIGRAM(S): at 23:30

## 2022-02-14 RX ADMIN — Medication 40 MILLIGRAM(S): at 20:17

## 2022-02-14 RX ADMIN — Medication 3 MILLILITER(S): at 20:17

## 2022-02-14 RX ADMIN — TAMSULOSIN HYDROCHLORIDE 0.4 MILLIGRAM(S): 0.4 CAPSULE ORAL at 22:37

## 2022-02-14 RX ADMIN — SIMVASTATIN 20 MILLIGRAM(S): 20 TABLET, FILM COATED ORAL at 22:37

## 2022-02-14 RX ADMIN — Medication 3 MILLILITER(S): at 18:33

## 2022-02-14 RX ADMIN — Medication 250 MILLIGRAM(S): at 19:26

## 2022-02-14 RX ADMIN — Medication 20 MILLIGRAM(S): at 22:41

## 2022-02-14 RX ADMIN — Medication 650 MILLIGRAM(S): at 18:41

## 2022-02-14 RX ADMIN — PIPERACILLIN AND TAZOBACTAM 200 GRAM(S): 4; .5 INJECTION, POWDER, LYOPHILIZED, FOR SOLUTION INTRAVENOUS at 18:32

## 2022-02-14 RX ADMIN — Medication 3 MILLILITER(S): at 19:27

## 2022-02-14 NOTE — ED PROVIDER NOTE - CARE PLAN
1 Principal Discharge DX:	Acute exacerbation of CHF (congestive heart failure)   Principal Discharge DX:	Acute exacerbation of CHF (congestive heart failure)  Secondary Diagnosis:	Sepsis  Secondary Diagnosis:	Consolidation lung  Secondary Diagnosis:	CKD (chronic kidney disease)

## 2022-02-14 NOTE — H&P ADULT - NSHPREVIEWOFSYSTEMS_GEN_ALL_CORE
REVIEW OF SYSTEMS:  CONSTITUTIONAL: Weakness  EYES/ENT: No visual changes;  No vertigo or throat pain   RESPIRATORY: Mild to moderate shortness of breath  CARDIOVASCULAR: No chest pain or palpitations  GASTROINTESTINAL: No abdominal  pain. No nausea, vomiting. No diarrhea or constipation. No melena or hematochezia.  GENITOURINARY: No dysuria, frequency or hematuria  NEUROLOGICAL: No numbness or weakness  SKIN: Bilateral legs swelling with skin changes

## 2022-02-14 NOTE — H&P ADULT - PROBLEM SELECTOR PLAN 1
- Patient was desaturated to 80s on ED arrival   - Saturated well on 4L NC; Patient is on 2L O2 NC   - Could be due to COPD on morbid obesity or CHF   - CXR showed bilateral pulmonary edema with suspecting for pneumonia   - Patient got 60mg IV lasix in ED   - Symptoms improved; Still on 4L NC but less respiratory distress   - Will continue to diuresis on 40mg IV lasix; Patient is on 40mg Lasix PO at home   - Will measure I&O   - Fluids restrictions   - Bed rest   - Fall precautions

## 2022-02-14 NOTE — H&P ADULT - NSHPPHYSICALEXAM_GEN_ALL_CORE
ICU Vital Signs Last 24 Hrs  T(C): 36.9 (14 Feb 2022 20:13), Max: 38.2 (14 Feb 2022 18:51)  T(F): 98.4 (14 Feb 2022 20:13), Max: 100.7 (14 Feb 2022 18:51)  HR: 96 (14 Feb 2022 20:00) (95 - 105)  BP: 180/156 (14 Feb 2022 20:00) (107/62 - 180/156)  BP(mean): 162 (14 Feb 2022 20:00) (121 - 162)  RR: 21 (14 Feb 2022 20:00) (17 - 21)  SpO2: 95% (14 Feb 2022 20:00) (94% - 98%)    GENERAL: NAD, lying in bed, AAOx2, in mild distress, on 4L NC   HEAD:  Atraumatic, Normocephalic  EYES: EOMI, PERRLA, conjunctiva and sclera clear  ENT: Moist mucous membranes  NECK: Supple, No JVD  CHEST/LUNG: Bilaterals crackles throughout lungs   HEART: Regular rate and rhythm; No murmurs, rubs, or gallops  ABDOMEN: Bowel sounds present; Soft, Nontender, Nondistended. No hepatomegally  EXTREMITIES:  2+ Peripheral Pulses, brisk capillary refill. No clubbing, cyanosis, or edema  NERVOUS SYSTEM:  Alert & Oriented X2, speech unclear. No deficits   MSK: FROM all 4 extremities, full and equal strength  SKIN: Bilateral leg swelling with skin changes due to venous stasis

## 2022-02-14 NOTE — ED PROVIDER NOTE - NSICDXPASTMEDICALHX_GEN_ALL_CORE_FT
PAST MEDICAL HISTORY:  Ambulatory dysfunction     Anemia     Bipolar disorder     Chronic obstructive pulmonary disease (COPD)     CKD (chronic kidney disease)     History of BPH     Hyperlipidemia     Hypothyroid

## 2022-02-14 NOTE — ED PROVIDER NOTE - ADMIT DISPOSITION PRESENT ON ADMISSION SEPSIS Q1 - RE-EVALUATED PATIENT FLUID AND VITAL SIGNS
No I have re-evaluated the patient's fluid status and reviewed vital signs. Clinical perfusion assessment was performed.

## 2022-02-14 NOTE — H&P ADULT - HISTORY OF PRESENT ILLNESS
Patient is 62 years old Morbid obese male from [facility] with past medical history of ambulates with walker has past medical hx of COPD not on O2 at home, hypothyroidism, anemia, left DVT, CKD, BPH, HLD, bipolar disorder, ambulatory dysfunction, lymphedema was brought to ED due to shortness of breath. Patient is not good historian. Patient states that he has this problem usually but he gets anxious and that's why it gets worse as he mentioned. Patient mentioned that he usually use 2L O2 at night. Patient was admitted to [facility] before in August and was started on BIPAP but for some insurance reasons, Patient wasn't discharged on BIPAP. Patient was found to have sepsis in ED as well and Antibiotics was given. CXR showed pulmonary edema and patient looks overloaded. Patient was jet Patient is 62 years old Morbid obese male from [facility] with past medical history of ambulates with walker has past medical hx of COPD not on O2 at home, hypothyroidism, anemia, left DVT, CKD, BPH, HLD, bipolar disorder, ambulatory dysfunction, lymphedema was brought to ED due to shortness of breath. Patient is not good historian. Patient states that he has this problem usually but he gets anxious and that's why it gets worse as he mentioned. Patient mentioned that he usually use 2L O2 at night. Patient was admitted to [facility] before in August and was started on BIPAP but for some insurance reasons, Patient wasn't discharged on BIPAP. Patient was found to have sepsis in ED as well and Antibiotics was given. CXR showed pulmonary edema and patient looks overloaded. During time of examination, Patient is AAox2. His speech was unclear. on 4L Oxygen NC. Patient denied chest pain, abdominal pain, leg pain, N/V or change in bowel movement.

## 2022-02-14 NOTE — ED PROVIDER NOTE - CLINICAL SUMMARY MEDICAL DECISION MAKING FREE TEXT BOX
Character low suspicion for ACS and ECG/trop unremarkable. No u/l swelling and lower suspicion overall for PE. Rales, b/l lymphedema, elevated BNP, Xray findings concerning for CHF. Given Duonebs on arrival as well in light of strong COPD hx. Given abx for fever. No arrhythmia. Patient well appearing, hemodynamically stable, no WOB, satting well on NRB. Admitted to internal medicine for further monitoring, w/u, and care.

## 2022-02-14 NOTE — ED ADULT NURSE REASSESSMENT NOTE - NS ED NURSE REASSESS COMMENT FT1
aaox3.no acute distress noted .vitals done .antibiotic in progress . safety maintained report given to roseanne /seferino.

## 2022-02-14 NOTE — ED PROVIDER NOTE - OBJECTIVE STATEMENT
62yoM with h/o COPD on home O2 2-3L baseline, no CPAP at night, morbid obesity, chronic lymphedema, CHF, RBBB, CKD 4, HTN, HLD, anemia, bipolar disorder, presents from [facility] assisted living with SOB. Pt states onset this morning, but now feels better after being placed on oxygen. Denies change in chronic lymphedema, CP, cough, fever, v/d, and all other symptoms. COVID booster in October, states never had COVID.

## 2022-02-14 NOTE — H&P ADULT - PROBLEM SELECTOR PLAN 2
- WBCs 14K, Hypotensive, Fever, Tachycardia + suspecting pneumonia   - Lactate is normal   - Patient is at his baseline mental status   - Got Zosyn and Vanc in ED   - Will start on IV Zithro and Rocephin   - No fluids IV   - Will f/u on Labs   - Monitor Vitals   - ID consult Dr. Nolasco if gets worse - WBCs 14K, Hypotensive, Fever, Tachycardia + suspecting pneumonia   - Lactate is normal   - Patient is at his baseline mental status   - Got Zosyn and Vanc in ED   - Will start on IV Zithro and Rocephin   - No fluids IV   - Cultures f/u   - Will f/u on Labs   - Monitor Vitals   - ID consult Dr. Nolasco if gets worse

## 2022-02-14 NOTE — H&P ADULT - PROBLEM SELECTOR PLAN 3
- Plan as above   - Will continue diuresis   - Could be CHF or flashing pulmonary edema   - Will get Echo

## 2022-02-14 NOTE — ED PROVIDER NOTE - PHYSICAL EXAMINATION
Febrile, hemodynamically stable, saturating high 80's on RA, 4L 90s  NAD, appearance of chronic disease, morbid obesity, no WOB, speaking full sentences  Head NCAT  EOMI grossly, anicteric  MMM  No JVD  RRR, nml S1/S2, no m/r/g  Lungs generalized rales  Abd soft, NT, ND, nml BS, no rebound or guarding  AAO, CN's 3-12 grossly intact  B/l appearance of chronic lymphedema/skin driness and weeping, no undue   Skin warm, dry

## 2022-02-14 NOTE — H&P ADULT - ASSESSMENT
Patient is 62 years old Morbid obese male from [facility] with past medical history of ambulates with walker has past medical hx of COPD not on O2 at home, hypothyroidism, anemia, left DVT, CKD, BPH, HLD, bipolar disorder, ambulatory dysfunction, lymphedema was brought to ED due to shortness of breath. Patient is not good historian. Patient states that he has this problem usually but he gets anxious and that's why it gets worse as he mentioned. Patient mentioned that he usually use 2L O2 at night. Patient was admitted to [facility] before in August and was started on BIPAP.     WBCs 14K, Hypotensive, tachycardia, Fever - Patient got IV Zosyn and Vancomycin in ED   CXR showed pulmonary edema; Patient got 60mg IV lasix in ED     Patient will be admitted for acute hypoxia respiratory failure due to COPD/Morbid obese or CHF exacerbation.

## 2022-02-14 NOTE — ED ADULT NURSE NOTE - OBJECTIVE STATEMENT
BIBA from nursing home with difficulty breathing ,has h/o chf and copd, oxygen dependent .bilateral leg swelling and redness noted .

## 2022-02-14 NOTE — ED ADULT NURSE NOTE - ED STAT RN HANDOFF DETAILS
Patient is currently sleeping, but response to tactile stimuli, breathing 4L/min of oxygen via nasal cannula with no sign of acute distress noted. Patient is admitted for acute exacerbation of CHF; pending bed assignment. Safety measures maintain. Endorsed to SHRUTHI Bravo.

## 2022-02-15 PROBLEM — F31.9 BIPOLAR DISORDER, UNSPECIFIED: Chronic | Status: ACTIVE | Noted: 2021-08-30

## 2022-02-15 PROBLEM — D64.9 ANEMIA, UNSPECIFIED: Chronic | Status: ACTIVE | Noted: 2021-08-30

## 2022-02-15 PROBLEM — R26.2 DIFFICULTY IN WALKING, NOT ELSEWHERE CLASSIFIED: Chronic | Status: ACTIVE | Noted: 2021-08-30

## 2022-02-15 PROBLEM — N18.9 CHRONIC KIDNEY DISEASE, UNSPECIFIED: Chronic | Status: ACTIVE | Noted: 2021-08-30

## 2022-02-15 PROBLEM — J44.9 CHRONIC OBSTRUCTIVE PULMONARY DISEASE, UNSPECIFIED: Chronic | Status: ACTIVE | Noted: 2021-08-30

## 2022-02-15 PROBLEM — E78.5 HYPERLIPIDEMIA, UNSPECIFIED: Chronic | Status: ACTIVE | Noted: 2021-08-30

## 2022-02-15 PROBLEM — Z87.438 PERSONAL HISTORY OF OTHER DISEASES OF MALE GENITAL ORGANS: Chronic | Status: ACTIVE | Noted: 2021-08-30

## 2022-02-15 LAB
A1C WITH ESTIMATED AVERAGE GLUCOSE RESULT: 5.2 % — SIGNIFICANT CHANGE UP (ref 4–5.6)
ALBUMIN SERPL ELPH-MCNC: 2.3 G/DL — LOW (ref 3.5–5)
ALP SERPL-CCNC: 80 U/L — SIGNIFICANT CHANGE UP (ref 40–120)
ALT FLD-CCNC: 10 U/L DA — SIGNIFICANT CHANGE UP (ref 10–60)
ANION GAP SERPL CALC-SCNC: 3 MMOL/L — LOW (ref 5–17)
AST SERPL-CCNC: 6 U/L — LOW (ref 10–40)
BASOPHILS # BLD AUTO: 0.02 K/UL — SIGNIFICANT CHANGE UP (ref 0–0.2)
BASOPHILS NFR BLD AUTO: 0.1 % — SIGNIFICANT CHANGE UP (ref 0–2)
BILIRUB SERPL-MCNC: 0.2 MG/DL — SIGNIFICANT CHANGE UP (ref 0.2–1.2)
BUN SERPL-MCNC: 53 MG/DL — HIGH (ref 7–18)
CALCIUM SERPL-MCNC: 9 MG/DL — SIGNIFICANT CHANGE UP (ref 8.4–10.5)
CHLORIDE SERPL-SCNC: 105 MMOL/L — SIGNIFICANT CHANGE UP (ref 96–108)
CHOLEST SERPL-MCNC: 138 MG/DL — SIGNIFICANT CHANGE UP
CO2 SERPL-SCNC: 34 MMOL/L — HIGH (ref 22–31)
CREAT SERPL-MCNC: 3.95 MG/DL — HIGH (ref 0.5–1.3)
CULTURE RESULTS: NO GROWTH — SIGNIFICANT CHANGE UP
D DIMER BLD IA.RAPID-MCNC: 331 NG/ML DDU — HIGH
EOSINOPHIL # BLD AUTO: 0.2 K/UL — SIGNIFICANT CHANGE UP (ref 0–0.5)
EOSINOPHIL NFR BLD AUTO: 1.4 % — SIGNIFICANT CHANGE UP (ref 0–6)
ESTIMATED AVERAGE GLUCOSE: 103 MG/DL — SIGNIFICANT CHANGE UP (ref 68–114)
FOLATE SERPL-MCNC: >20 NG/ML — SIGNIFICANT CHANGE UP
GLUCOSE SERPL-MCNC: 100 MG/DL — HIGH (ref 70–99)
HCT VFR BLD CALC: 28.1 % — LOW (ref 39–50)
HDLC SERPL-MCNC: 76 MG/DL — SIGNIFICANT CHANGE UP
HGB BLD-MCNC: 8.8 G/DL — LOW (ref 13–17)
IMM GRANULOCYTES NFR BLD AUTO: 0.5 % — SIGNIFICANT CHANGE UP (ref 0–1.5)
LIPID PNL WITH DIRECT LDL SERPL: 47 MG/DL — SIGNIFICANT CHANGE UP
LYMPHOCYTES # BLD AUTO: 1.18 K/UL — SIGNIFICANT CHANGE UP (ref 1–3.3)
LYMPHOCYTES # BLD AUTO: 8.5 % — LOW (ref 13–44)
MAGNESIUM SERPL-MCNC: 2.9 MG/DL — HIGH (ref 1.6–2.6)
MCHC RBC-ENTMCNC: 31.3 GM/DL — LOW (ref 32–36)
MCHC RBC-ENTMCNC: 31.5 PG — SIGNIFICANT CHANGE UP (ref 27–34)
MCV RBC AUTO: 100.7 FL — HIGH (ref 80–100)
MONOCYTES # BLD AUTO: 1.26 K/UL — HIGH (ref 0–0.9)
MONOCYTES NFR BLD AUTO: 9 % — SIGNIFICANT CHANGE UP (ref 2–14)
NEUTROPHILS # BLD AUTO: 11.2 K/UL — HIGH (ref 1.8–7.4)
NEUTROPHILS NFR BLD AUTO: 80.5 % — HIGH (ref 43–77)
NON HDL CHOLESTEROL: 62 MG/DL — SIGNIFICANT CHANGE UP
NRBC # BLD: 0 /100 WBCS — SIGNIFICANT CHANGE UP (ref 0–0)
PHOSPHATE SERPL-MCNC: 5.5 MG/DL — HIGH (ref 2.5–4.5)
PLATELET # BLD AUTO: 263 K/UL — SIGNIFICANT CHANGE UP (ref 150–400)
POTASSIUM SERPL-MCNC: 4.6 MMOL/L — SIGNIFICANT CHANGE UP (ref 3.5–5.3)
POTASSIUM SERPL-SCNC: 4.6 MMOL/L — SIGNIFICANT CHANGE UP (ref 3.5–5.3)
PROT SERPL-MCNC: 7.3 G/DL — SIGNIFICANT CHANGE UP (ref 6–8.3)
RBC # BLD: 2.79 M/UL — LOW (ref 4.2–5.8)
RBC # FLD: 14.6 % — HIGH (ref 10.3–14.5)
SODIUM SERPL-SCNC: 142 MMOL/L — SIGNIFICANT CHANGE UP (ref 135–145)
SPECIMEN SOURCE: SIGNIFICANT CHANGE UP
TRIGL SERPL-MCNC: 75 MG/DL — SIGNIFICANT CHANGE UP
VIT B12 SERPL-MCNC: 374 PG/ML — SIGNIFICANT CHANGE UP (ref 232–1245)
WBC # BLD: 13.93 K/UL — HIGH (ref 3.8–10.5)
WBC # FLD AUTO: 13.93 K/UL — HIGH (ref 3.8–10.5)

## 2022-02-15 RX ORDER — FUROSEMIDE 40 MG
40 TABLET ORAL DAILY
Refills: 0 | Status: DISCONTINUED | OUTPATIENT
Start: 2022-02-15 | End: 2022-02-15

## 2022-02-15 RX ORDER — POLYETHYLENE GLYCOL 3350 17 G/17G
17 POWDER, FOR SOLUTION ORAL DAILY
Refills: 0 | Status: DISCONTINUED | OUTPATIENT
Start: 2022-02-15 | End: 2022-02-18

## 2022-02-15 RX ORDER — FUROSEMIDE 40 MG
60 TABLET ORAL
Refills: 0 | Status: DISCONTINUED | OUTPATIENT
Start: 2022-02-15 | End: 2022-02-17

## 2022-02-15 RX ADMIN — FINASTERIDE 5 MILLIGRAM(S): 5 TABLET, FILM COATED ORAL at 12:59

## 2022-02-15 RX ADMIN — BUDESONIDE AND FORMOTEROL FUMARATE DIHYDRATE 2 PUFF(S): 160; 4.5 AEROSOL RESPIRATORY (INHALATION) at 23:54

## 2022-02-15 RX ADMIN — DIVALPROEX SODIUM 500 MILLIGRAM(S): 500 TABLET, DELAYED RELEASE ORAL at 10:04

## 2022-02-15 RX ADMIN — Medication 10 MILLIGRAM(S): at 19:04

## 2022-02-15 RX ADMIN — BUPROPION HYDROCHLORIDE 150 MILLIGRAM(S): 150 TABLET, EXTENDED RELEASE ORAL at 13:57

## 2022-02-15 RX ADMIN — BUDESONIDE AND FORMOTEROL FUMARATE DIHYDRATE 2 PUFF(S): 160; 4.5 AEROSOL RESPIRATORY (INHALATION) at 10:04

## 2022-02-15 RX ADMIN — SEVELAMER CARBONATE 800 MILLIGRAM(S): 2400 POWDER, FOR SUSPENSION ORAL at 13:03

## 2022-02-15 RX ADMIN — BUDESONIDE AND FORMOTEROL FUMARATE DIHYDRATE 2 PUFF(S): 160; 4.5 AEROSOL RESPIRATORY (INHALATION) at 01:00

## 2022-02-15 RX ADMIN — Medication 125 MICROGRAM(S): at 05:41

## 2022-02-15 RX ADMIN — SEVELAMER CARBONATE 800 MILLIGRAM(S): 2400 POWDER, FOR SUSPENSION ORAL at 17:46

## 2022-02-15 RX ADMIN — AZITHROMYCIN 255 MILLIGRAM(S): 500 TABLET, FILM COATED ORAL at 20:35

## 2022-02-15 RX ADMIN — CALCITRIOL 0.25 MICROGRAM(S): 0.5 CAPSULE ORAL at 13:57

## 2022-02-15 RX ADMIN — SIMVASTATIN 20 MILLIGRAM(S): 20 TABLET, FILM COATED ORAL at 18:41

## 2022-02-15 RX ADMIN — DIVALPROEX SODIUM 500 MILLIGRAM(S): 500 TABLET, DELAYED RELEASE ORAL at 19:04

## 2022-02-15 RX ADMIN — Medication 325 MILLIGRAM(S): at 12:59

## 2022-02-15 RX ADMIN — Medication 60 MILLIGRAM(S): at 18:40

## 2022-02-15 RX ADMIN — SEVELAMER CARBONATE 800 MILLIGRAM(S): 2400 POWDER, FOR SUSPENSION ORAL at 10:04

## 2022-02-15 RX ADMIN — Medication 1 MILLIGRAM(S): at 12:59

## 2022-02-15 RX ADMIN — CEFTRIAXONE 100 MILLIGRAM(S): 500 INJECTION, POWDER, FOR SOLUTION INTRAMUSCULAR; INTRAVENOUS at 19:05

## 2022-02-15 RX ADMIN — RISPERIDONE 2 MILLIGRAM(S): 4 TABLET ORAL at 12:59

## 2022-02-15 RX ADMIN — Medication 10 MILLIGRAM(S): at 05:41

## 2022-02-15 RX ADMIN — TAMSULOSIN HYDROCHLORIDE 0.4 MILLIGRAM(S): 0.4 CAPSULE ORAL at 22:14

## 2022-02-15 RX ADMIN — Medication 2000 UNIT(S): at 12:59

## 2022-02-15 RX ADMIN — Medication 81 MILLIGRAM(S): at 12:59

## 2022-02-15 NOTE — PATIENT PROFILE ADULT - FUNCTIONAL ASSESSMENT - BASIC MOBILITY 4.
78y/o F with PMH of uterine CA s/p SHAAN on chemo (finished 01/2021, [place]), CAD CABG, hx of PE on xarelto, HTN, depression presents to ED for abd discomfort. Hx of constipation, last seen in ED 2 wk ago. pt placed on lactulose with "extreme relief". Here today for SP pressure. pain mild, non radiating with no alleviating or aggravating factors. No fever/chills, hx of STD, hx of smoking, weight loss, CP, SOB, abd pain, N/V/D, blood per rectum. Patient is passing gas. Denies dysuria or vaginal discharge
3 = A little assistance

## 2022-02-15 NOTE — PATIENT PROFILE ADULT - FALL HARM RISK - RISK INTERVENTIONS

## 2022-02-15 NOTE — PROGRESS NOTE ADULT - PROBLEM SELECTOR PLAN 1
- Patient was desaturated to 80s on ED arrival   - Saturated well on 4L NC; Patient is on 2L O2 NC   - Could be due to COPD on morbid obesity or CHF   - CXR read with b/l atelectasis with questionable small left base infiltrate, no PTX, pleural eff. However clinically has rales on exam.  - Appreciated Cards Dr. Javier's recs: Cont lasix 60mg BID  - Will measure I&O   - Fluids restrictions   - Bed rest   - Fall precautions

## 2022-02-15 NOTE — PROGRESS NOTE ADULT - ASSESSMENT
Patient is 62 years old Morbid obese male from [facility] with past medical history of ambulates with walker has past medical hx of COPD not on O2 at home, hypothyroidism, anemia, left DVT, CKD, BPH, HLD, bipolar disorder, ambulatory dysfunction, lymphedema was brought to ED due to shortness of breath. Patient is not good historian. Patient states that he has this problem usually but he gets anxious and that's why it gets worse as he mentioned. Patient mentioned that he usually use 2L O2 at night. Patient was admitted to [facility] before in August and was started on BIPAP.     WBCs 14K, Hypotensive, tachycardia, Fever - Patient got IV Zosyn and Vancomycin in ED   CXR showed pulmonary edema; Patient got 60mg IV lasix in ED     Patient will be admitted for acute hypoxia respiratory failure due to COPD/Morbid obese or CHF exacerbation.

## 2022-02-15 NOTE — PROGRESS NOTE ADULT - SUBJECTIVE AND OBJECTIVE BOX
Patient is a 62y old  Male who presents with a chief complaint of Shortness of breath / CHF (15 Feb 2022 13:15)    PATIENT IS SEEN AND EXAMINED IN MEDICAL FLOOR.  SOWMYA [    ]    ALYSIA [   ]      GT [   ]    ALLERGIES:  No Known Allergies      Daily     Daily     VITALS:    Vital Signs Last 24 Hrs  T(C): 36.7 (15 Feb 2022 16:06), Max: 37.2 (15 Feb 2022 07:00)  T(F): 98 (15 Feb 2022 16:06), Max: 98.9 (15 Feb 2022 07:00)  HR: 88 (15 Feb 2022 16:06) (78 - 95)  BP: 164/67 (15 Feb 2022 16:06) (98/46 - 164/67)  BP(mean): 80 (15 Feb 2022 15:45) (57 - 89)  RR: 17 (15 Feb 2022 16:06) (17 - 20)  SpO2: 96% (15 Feb 2022 16:06) (95% - 98%)    LABS:    CBC Full  -  ( 15 Feb 2022 05:43 )  WBC Count : 13.93 K/uL  RBC Count : 2.79 M/uL  Hemoglobin : 8.8 g/dL  Hematocrit : 28.1 %  Platelet Count - Automated : 263 K/uL  Mean Cell Volume : 100.7 fl  Mean Cell Hemoglobin : 31.5 pg  Mean Cell Hemoglobin Concentration : 31.3 gm/dL  Auto Neutrophil # : 11.20 K/uL  Auto Lymphocyte # : 1.18 K/uL  Auto Monocyte # : 1.26 K/uL  Auto Eosinophil # : 0.20 K/uL  Auto Basophil # : 0.02 K/uL  Auto Neutrophil % : 80.5 %  Auto Lymphocyte % : 8.5 %  Auto Monocyte % : 9.0 %  Auto Eosinophil % : 1.4 %  Auto Basophil % : 0.1 %    PT/INR - ( 14 Feb 2022 18:27 )   PT: 12.0 sec;   INR: 1.01 ratio         PTT - ( 14 Feb 2022 18:27 )  PTT:33.2 sec  02-15    142  |  105  |  53<H>  ----------------------------<  100<H>  4.6   |  34<H>  |  3.95<H>    Ca    9.0      15 Feb 2022 05:43  Phos  5.5     02-15  Mg     2.9     02-15    TPro  7.3  /  Alb  2.3<L>  /  TBili  0.2  /  DBili  x   /  AST  6<L>  /  ALT  10  /  AlkPhos  80  02-15    CAPILLARY BLOOD GLUCOSE            LIVER FUNCTIONS - ( 15 Feb 2022 05:43 )  Alb: 2.3 g/dL / Pro: 7.3 g/dL / ALK PHOS: 80 U/L / ALT: 10 U/L DA / AST: 6 U/L / GGT: x           Creatinine Trend: 3.95<--, 3.82<--  I&O's Summary    14 Feb 2022 07:01  -  15 Feb 2022 07:00  --------------------------------------------------------  IN: 0 mL / OUT: 600 mL / NET: -600 mL    15 Feb 2022 07:01  -  15 Feb 2022 21:09  --------------------------------------------------------  IN: 480 mL / OUT: 400 mL / NET: 80 mL        ABG - ( 14 Feb 2022 20:18 )  pH, Arterial: 7.33  pH, Blood: x     /  pCO2: 64    /  pO2: 60    / HCO3: 34    / Base Excess: 5.7   /  SaO2: 92                  .Blood Blood-Peripheral  02-14 @ 20:45   No growth to date.  --  --      Clean Catch Clean Catch (Midstream)  02-14 @ 20:43   No growth  --  --          MEDICATIONS:    MEDICATIONS  (STANDING):  aspirin  chewable 81 milliGRAM(s) Oral daily  azithromycin  IVPB 500 milliGRAM(s) IV Intermittent every 24 hours  budesonide 160 MICROgram(s)/formoterol 4.5 MICROgram(s) Inhaler 2 Puff(s) Inhalation two times a day  buPROPion XL (24-Hour) . 150 milliGRAM(s) Oral daily  busPIRone 10 milliGRAM(s) Oral two times a day  calcitriol   Capsule 0.25 MICROGram(s) Oral daily  cefTRIAXone   IVPB 1000 milliGRAM(s) IV Intermittent every 24 hours  cholecalciferol 2000 Unit(s) Oral daily  diVALproex  milliGRAM(s) Oral <User Schedule>  ferrous    sulfate 325 milliGRAM(s) Oral daily  finasteride 5 milliGRAM(s) Oral daily  folic acid 1 milliGRAM(s) Oral daily  furosemide   Injectable 60 milliGRAM(s) IV Push two times a day  levothyroxine 125 MICROGram(s) Oral daily  nicotine -  14 mG/24Hr(s) Patch 1 patch Transdermal daily  pantoprazole    Tablet 40 milliGRAM(s) Oral before breakfast  risperiDONE   Tablet 2 milliGRAM(s) Oral daily  sevelamer carbonate 800 milliGRAM(s) Oral three times a day with meals  simvastatin 20 milliGRAM(s) Oral at bedtime  tamsulosin 0.4 milliGRAM(s) Oral at bedtime      MEDICATIONS  (PRN):  acetaminophen     Tablet .. 650 milliGRAM(s) Oral every 6 hours PRN Temp greater or equal to 38C (100.4F), Mild Pain (1 - 3)  ALBUTerol    90 MICROgram(s) HFA Inhaler 2 Puff(s) Inhalation every 6 hours PRN Shortness of Breath and/or Wheezing  magnesium hydroxide Suspension 30 milliLiter(s) Oral at bedtime PRN Constipation      REVIEW OF SYSTEMS:                           ALL ROS DONE [ X   ]    CONSTITUTIONAL:  LETHARGIC [   ], FEVER [   ], UNRESPONSIVE [   ]  CVS:  CP  [   ], SOB, [   ], PALPITATIONS [   ], DIZZYNESS [   ]  RS: COUGH [   ], SPUTUM [   ]  GI: ABDOMINAL PAIN [   ], NAUSEA [   ], VOMITINGS [   ], DIARRHEA [   ], CONSTIPATION [   ]  :  DYSURIA [   ], NOCTURIA [   ], INCREASED FREQUENCY [   ], DRIBLING [   ],  SKELETAL: PAINFUL JOINTS [   ], SWOLLEN JOINTS [   ], NECK ACHE [   ], LOW BACK ACHE [   ],  SKIN : ULCERS [   ], RASH [   ], ITCHING [   ]  CNS: HEAD ACHE [   ], DOUBLE VISION [   ], BLURRED VISION [   ], AMS / CONFUSION [   ], SEIZURES [   ], WEAKNESS [   ],TINGLING / NUMBNESS [   ]    PHYSICAL EXAMINATION:  GENERAL APPEARANCE: NO DISTRESS  HEENT:  NO PALLOR, NO  JVD,  NO   NODES, NECK SUPPLE  CVS: S1 +, S2 +,   RS: AEEB,  OCCASIONAL  RALES +,   NO RONCHI  ABD: SOFT, NT, NO, BS +  EXT: NO PE  SKIN: WARM,   SKELETAL:  ROM ACCEPTABLE  CNS:  AAO X    ,   DEFICITS    RADIOLOGY :      ASSESSMENT :     Heart failure    No pertinent past medical history    Hypothyroid    Hyperlipidemia    Schizophrenia    Schizoaffective disorder    Ambulatory dysfunction    Anemia    History of BPH    CKD (chronic kidney disease)    Chronic obstructive pulmonary disease (COPD)    Bipolar disorder    Bipolar disorder    No significant past surgical history        PLAN:  HPI:  Patient is 62 years old Morbid obese male from [facility] with past medical history of ambulates with walker has past medical hx of COPD not on O2 at home, hypothyroidism, anemia, left DVT, CKD, BPH, HLD, bipolar disorder, ambulatory dysfunction, lymphedema was brought to ED due to shortness of breath. Patient is not good historian. Patient states that he has this problem usually but he gets anxious and that's why it gets worse as he mentioned. Patient mentioned that he usually use 2L O2 at night. Patient was admitted to [facility] before in August and was started on BIPAP but for some insurance reasons, Patient wasn't discharged on BIPAP. Patient was found to have sepsis in ED as well and Antibiotics was given. CXR showed pulmonary edema and patient looks overloaded. During time of examination, Patient is AAox2. His speech was unclear. on 4L Oxygen NC. Patient denied chest pain, abdominal pain, leg pain, N/V or change in bowel movement. (14 Feb 2022 21:20)    -      Patient is a 62y old  Male who presents with a chief complaint of Shortness of breath / CHF (15 Feb 2022 13:15)    PATIENT IS SEEN AND EXAMINED IN MEDICAL FLOOR.  SOWMYA [    ]    LAYSIA [   ]      GT [   ]    ALLERGIES:  No Known Allergies      Daily     Daily     VITALS:    Vital Signs Last 24 Hrs  T(C): 36.7 (15 Feb 2022 16:06), Max: 37.2 (15 Feb 2022 07:00)  T(F): 98 (15 Feb 2022 16:06), Max: 98.9 (15 Feb 2022 07:00)  HR: 88 (15 Feb 2022 16:06) (78 - 95)  BP: 164/67 (15 Feb 2022 16:06) (98/46 - 164/67)  BP(mean): 80 (15 Feb 2022 15:45) (57 - 89)  RR: 17 (15 Feb 2022 16:06) (17 - 20)  SpO2: 96% (15 Feb 2022 16:06) (95% - 98%)    LABS:    CBC Full  -  ( 15 Feb 2022 05:43 )  WBC Count : 13.93 K/uL  RBC Count : 2.79 M/uL  Hemoglobin : 8.8 g/dL  Hematocrit : 28.1 %  Platelet Count - Automated : 263 K/uL  Mean Cell Volume : 100.7 fl  Mean Cell Hemoglobin : 31.5 pg  Mean Cell Hemoglobin Concentration : 31.3 gm/dL  Auto Neutrophil # : 11.20 K/uL  Auto Lymphocyte # : 1.18 K/uL  Auto Monocyte # : 1.26 K/uL  Auto Eosinophil # : 0.20 K/uL  Auto Basophil # : 0.02 K/uL  Auto Neutrophil % : 80.5 %  Auto Lymphocyte % : 8.5 %  Auto Monocyte % : 9.0 %  Auto Eosinophil % : 1.4 %  Auto Basophil % : 0.1 %    PT/INR - ( 14 Feb 2022 18:27 )   PT: 12.0 sec;   INR: 1.01 ratio         PTT - ( 14 Feb 2022 18:27 )  PTT:33.2 sec  02-15    142  |  105  |  53<H>  ----------------------------<  100<H>  4.6   |  34<H>  |  3.95<H>    Ca    9.0      15 Feb 2022 05:43  Phos  5.5     02-15  Mg     2.9     02-15    TPro  7.3  /  Alb  2.3<L>  /  TBili  0.2  /  DBili  x   /  AST  6<L>  /  ALT  10  /  AlkPhos  80  02-15    CAPILLARY BLOOD GLUCOSE            LIVER FUNCTIONS - ( 15 Feb 2022 05:43 )  Alb: 2.3 g/dL / Pro: 7.3 g/dL / ALK PHOS: 80 U/L / ALT: 10 U/L DA / AST: 6 U/L / GGT: x           Creatinine Trend: 3.95<--, 3.82<--  I&O's Summary    14 Feb 2022 07:01  -  15 Feb 2022 07:00  --------------------------------------------------------  IN: 0 mL / OUT: 600 mL / NET: -600 mL    15 Feb 2022 07:01  -  15 Feb 2022 21:09  --------------------------------------------------------  IN: 480 mL / OUT: 400 mL / NET: 80 mL        ABG - ( 14 Feb 2022 20:18 )  pH, Arterial: 7.33  pH, Blood: x     /  pCO2: 64    /  pO2: 60    / HCO3: 34    / Base Excess: 5.7   /  SaO2: 92                  .Blood Blood-Peripheral  02-14 @ 20:45   No growth to date.  --  --      Clean Catch Clean Catch (Midstream)  02-14 @ 20:43   No growth  --  --          MEDICATIONS:    MEDICATIONS  (STANDING):  aspirin  chewable 81 milliGRAM(s) Oral daily  azithromycin  IVPB 500 milliGRAM(s) IV Intermittent every 24 hours  budesonide 160 MICROgram(s)/formoterol 4.5 MICROgram(s) Inhaler 2 Puff(s) Inhalation two times a day  buPROPion XL (24-Hour) . 150 milliGRAM(s) Oral daily  busPIRone 10 milliGRAM(s) Oral two times a day  calcitriol   Capsule 0.25 MICROGram(s) Oral daily  cefTRIAXone   IVPB 1000 milliGRAM(s) IV Intermittent every 24 hours  cholecalciferol 2000 Unit(s) Oral daily  diVALproex  milliGRAM(s) Oral <User Schedule>  ferrous    sulfate 325 milliGRAM(s) Oral daily  finasteride 5 milliGRAM(s) Oral daily  folic acid 1 milliGRAM(s) Oral daily  furosemide   Injectable 60 milliGRAM(s) IV Push two times a day  levothyroxine 125 MICROGram(s) Oral daily  nicotine -  14 mG/24Hr(s) Patch 1 patch Transdermal daily  pantoprazole    Tablet 40 milliGRAM(s) Oral before breakfast  risperiDONE   Tablet 2 milliGRAM(s) Oral daily  sevelamer carbonate 800 milliGRAM(s) Oral three times a day with meals  simvastatin 20 milliGRAM(s) Oral at bedtime  tamsulosin 0.4 milliGRAM(s) Oral at bedtime      MEDICATIONS  (PRN):  acetaminophen     Tablet .. 650 milliGRAM(s) Oral every 6 hours PRN Temp greater or equal to 38C (100.4F), Mild Pain (1 - 3)  ALBUTerol    90 MICROgram(s) HFA Inhaler 2 Puff(s) Inhalation every 6 hours PRN Shortness of Breath and/or Wheezing  magnesium hydroxide Suspension 30 milliLiter(s) Oral at bedtime PRN Constipation      REVIEW OF SYSTEMS:                           ALL ROS DONE [ X   ]    CONSTITUTIONAL:  LETHARGIC [   ], FEVER [   ], UNRESPONSIVE [   ]  CVS:  CP  [   ], SOB, [   ], PALPITATIONS [   ], DIZZYNESS [   ]  RS: COUGH [   ], SPUTUM [   ]  GI: ABDOMINAL PAIN [   ], NAUSEA [   ], VOMITINGS [   ], DIARRHEA [   ], CONSTIPATION [   ]  :  DYSURIA [   ], NOCTURIA [   ], INCREASED FREQUENCY [   ], DRIBLING [   ],  SKELETAL: PAINFUL JOINTS [   ], SWOLLEN JOINTS [   ], NECK ACHE [   ], LOW BACK ACHE [   ],  SKIN : ULCERS [   ], RASH [   ], ITCHING [   ]  CNS: HEAD ACHE [   ], DOUBLE VISION [   ], BLURRED VISION [   ], AMS / CONFUSION [   ], SEIZURES [   ], WEAKNESS [   ],TINGLING / NUMBNESS [   ]    PHYSICAL EXAMINATION:  GENERAL APPEARANCE: NO DISTRESS  HEENT:  NO PALLOR, NO  JVD,  NO   NODES, NECK SUPPLE  CVS: S1 +, S2 +,   RS: AEEB,  OCCASIONAL  RALES +,   NO RONCHI  ABD: SOFT, NT, NO, BS +  EXT: PE +  SKIN: WARM,   SKELETAL:  ROM ACCEPTABLE  CNS:  AAO X 1-2    RADIOLOGY :    ACC: [id number] EXAM:  XR CHEST PORTABLE IMMED 1V                          PROCEDURE DATE:  02/14/2022      Frontal expiratory view of the chest shows the heart to be similar in   size. The lungs show more atelectasis bilaterally with questionable small   left base infiltrate and there is no evidence of pneumothorax nor pleural   effusion. Old right rib fractures are again noted.    IMPRESSION:  Questionable left base infiltrate. Follow-up study is recommended as   clinically warranted.      ASSESSMENT :     Heart failure    No pertinent past medical history    Hypothyroid    Hyperlipidemia    Schizophrenia    Schizoaffective disorder    Ambulatory dysfunction    Anemia    History of BPH    CKD (chronic kidney disease)    Chronic obstructive pulmonary disease (COPD)    Bipolar disorder    Bipolar disorder    No significant past surgical history        PLAN:  HPI:  Patient is 62 years old Morbid obese male from [facility] with past medical history of ambulates with walker has past medical hx of COPD not on O2 at home, hypothyroidism, anemia, left DVT, CKD, BPH, HLD, bipolar disorder, ambulatory dysfunction, lymphedema was brought to ED due to shortness of breath. Patient is not good historian. Patient states that he has this problem usually but he gets anxious and that's why it gets worse as he mentioned. Patient mentioned that he usually use 2L O2 at night. Patient was admitted to [facility] before in August and was started on BIPAP but for some insurance reasons, Patient wasn't discharged on BIPAP. Patient was found to have sepsis in ED as well and Antibiotics was given. CXR showed pulmonary edema and patient looks overloaded. During time of examination, Patient is AAox2. His speech was unclear. on 4L Oxygen NC. Patient denied chest pain, abdominal pain, leg pain, N/V or change in bowel movement. (14 Feb 2022 21:20)      # ACUTE HYPOXIC RESPIRATORY FAILURE S/T PULMONARY EDEMA, POSSIBLE PNA - PLACED ON ROCEPHIN AND AZITHROMYCIN, LASIX, F/U ECHOCARDIOGRAM  # CHRONIC COPD, MILD PULM HTN  # ?TEJ ON BIPAP [NONCOMPLIANT]  - F/U BCX  - STRICT IS AND OS  - CARDIOLOGY CONSULT    - F/U CT CHEST    # CKD - MONITOR CR, AVOID NEPHROTOXIC AGENTS   - FLOMAX,  FINASTERIDE  - WILL CONSULT NEPHROLOGY    # CONSTIPATION - PLANNED FOR TWE X 2 DAYS, MIRALAX    # HYPOTHYROIDISM  # MORBID OBESITY  # CHRONIC VENOUS INSUFFICIENCY, HX OF LEFT DVT    # GI AND DVT PPX

## 2022-02-15 NOTE — PROGRESS NOTE ADULT - SUBJECTIVE AND OBJECTIVE BOX
Chief Complaint:  HPI:  Patient is 62 years old Morbid obese male from [facility] with past medical history of ambulates with walker has past medical hx of COPD not on O2 at home, hypothyroidism, anemia, left DVT, CKD, BPH, HLD, bipolar disorder, ambulatory dysfunction, lymphedema was brought to ED due to shortness of breath. Patient is not good historian. Patient states that he has this problem usually but he gets anxious and that's why it gets worse as he mentioned. Patient mentioned that he usually use 2L O2 at night. Patient was admitted to [facility] before in August and was started on BIPAP but for some insurance reasons, Patient wasn't discharged on BIPAP. Patient was found to have sepsis in ED as well and Antibiotics was given. CXR showed pulmonary edema and patient looks overloaded. During time of examination, Patient is AAox2. His speech was unclear. on 4L Oxygen NC. Patient denied chest pain, abdominal pain, leg pain, N/V or change in bowel movement. (14 Feb 2022 21:20)    No acute events overnight, was seen by Cardiology earlier today.  Has chronic right thigh pain from falling in a pothole, otherwise denies complaints.      Allergies    No Known Allergies    Intolerances        REVIEW OF SYSTEMS:  As above.      Vital Signs Last 24 Hrs  T(C): 36.6 (15 Feb 2022 11:26), Max: 38.2 (14 Feb 2022 18:51)  T(F): 97.8 (15 Feb 2022 11:26), Max: 100.7 (14 Feb 2022 18:51)  HR: 85 (15 Feb 2022 11:26) (78 - 105)  BP: 124/57 (15 Feb 2022 11:26) (98/46 - 180/156)  BP(mean): 57 (15 Feb 2022 05:36) (57 - 162)  RR: 19 (15 Feb 2022 11:26) (17 - 21)  SpO2: 96% (15 Feb 2022 11:26) (94% - 98%)    MedsMEDICATIONS  (STANDING):  aspirin  chewable 81 milliGRAM(s) Oral daily  azithromycin  IVPB 500 milliGRAM(s) IV Intermittent every 24 hours  budesonide 160 MICROgram(s)/formoterol 4.5 MICROgram(s) Inhaler 2 Puff(s) Inhalation two times a day  buPROPion XL (24-Hour) . 150 milliGRAM(s) Oral daily  busPIRone 10 milliGRAM(s) Oral two times a day  calcitriol   Capsule 0.25 MICROGram(s) Oral daily  cefTRIAXone   IVPB 1000 milliGRAM(s) IV Intermittent every 24 hours  cholecalciferol 2000 Unit(s) Oral daily  diVALproex  milliGRAM(s) Oral <User Schedule>  ferrous    sulfate 325 milliGRAM(s) Oral daily  finasteride 5 milliGRAM(s) Oral daily  folic acid 1 milliGRAM(s) Oral daily  furosemide   Injectable 60 milliGRAM(s) IV Push two times a day  levothyroxine 125 MICROGram(s) Oral daily  nicotine -  14 mG/24Hr(s) Patch 1 patch Transdermal daily  pantoprazole    Tablet 40 milliGRAM(s) Oral before breakfast  risperiDONE   Tablet 2 milliGRAM(s) Oral daily  sevelamer carbonate 800 milliGRAM(s) Oral three times a day with meals  simvastatin 20 milliGRAM(s) Oral at bedtime  tamsulosin 0.4 milliGRAM(s) Oral at bedtime    MEDICATIONS  (PRN):  acetaminophen     Tablet .. 650 milliGRAM(s) Oral every 6 hours PRN Temp greater or equal to 38C (100.4F), Mild Pain (1 - 3)  ALBUTerol    90 MICROgram(s) HFA Inhaler 2 Puff(s) Inhalation every 6 hours PRN Shortness of Breath and/or Wheezing  magnesium hydroxide Suspension 30 milliLiter(s) Oral at bedtime PRN Constipation      PHYSICAL EXAM:  GENERAL: NAD, well-groomed, well-developed  NEURO: AAOx3, BIJAN b/l, 5/5 b/l UE and 5/5 b/l LE motor strength  LUNG: Lungs dim b/l, +crackles b/l  HEART: S1, S2, no S3 or S4. Regular rate and rhythm. No murmurs, gallops, or rubs. Cannot assess JVD  ABDOMEN: Bowel sounds present, abd Soft, Nontender, Nondistended  EXTREMITIES:  2+ Peripheral Pulses b/l, No clubbing, cyanosis, or edema  SKIN: Darkened skin b/l LE likely from venous stasis.    Consultant(s) Notes Reviewed:  [x ] YES  [ ] NO  Care Discussed with Consultants/Other Providers [ x] YES  [ ] NO    LABS:                        8.8    13.93 )-----------( 263      ( 15 Feb 2022 05:43 )             28.1     02-15    142  |  105  |  53<H>  ----------------------------<  100<H>  4.6   |  34<H>  |  3.95<H>    Ca    9.0      15 Feb 2022 05:43  Phos  5.5     02-15  Mg     2.9     02-15    TPro  7.3  /  Alb  2.3<L>  /  TBili  0.2  /  DBili  x   /  AST  6<L>  /  ALT  10  /  AlkPhos  80  02-15    PT/INR - ( 14 Feb 2022 18:27 )   PT: 12.0 sec;   INR: 1.01 ratio         PTT - ( 14 Feb 2022 18:27 )  PTT:33.2 sec    RADIOLOGY & ADDITIONAL TESTS:    EKG:     Chief Complaint:  HPI:  Patient is 62 years old Morbid obese male from [facility] with past medical history of ambulates with walker has past medical hx of COPD not on O2 at home, hypothyroidism, anemia, left DVT, CKD, BPH, HLD, bipolar disorder, ambulatory dysfunction, lymphedema was brought to ED due to shortness of breath. Patient is not good historian. Patient states that he has this problem usually but he gets anxious and that's why it gets worse as he mentioned. Patient mentioned that he usually use 2L O2 at night. Patient was admitted to [facility] before in August and was started on BIPAP but for some insurance reasons, Patient wasn't discharged on BIPAP. Patient was found to have sepsis in ED as well and Antibiotics was given. CXR showed pulmonary edema and patient looks overloaded. During time of examination, Patient is AAox2. His speech was unclear. on 4L Oxygen NC. Patient denied chest pain, abdominal pain, leg pain, N/V or change in bowel movement. (14 Feb 2022 21:20)    No acute events overnight, was seen by Cardiology earlier today.  Has chronic right thigh pain from falling in a pothole, otherwise denies complaints.      Allergies    No Known Allergies    Intolerances        REVIEW OF SYSTEMS:  As above.      Vital Signs Last 24 Hrs  T(C): 36.6 (15 Feb 2022 11:26), Max: 38.2 (14 Feb 2022 18:51)  T(F): 97.8 (15 Feb 2022 11:26), Max: 100.7 (14 Feb 2022 18:51)  HR: 85 (15 Feb 2022 11:26) (78 - 105)  BP: 124/57 (15 Feb 2022 11:26) (98/46 - 180/156)  BP(mean): 57 (15 Feb 2022 05:36) (57 - 162)  RR: 19 (15 Feb 2022 11:26) (17 - 21)  SpO2: 96% (15 Feb 2022 11:26) (94% - 98%)    MedsMEDICATIONS  (STANDING):  aspirin  chewable 81 milliGRAM(s) Oral daily  azithromycin  IVPB 500 milliGRAM(s) IV Intermittent every 24 hours  budesonide 160 MICROgram(s)/formoterol 4.5 MICROgram(s) Inhaler 2 Puff(s) Inhalation two times a day  buPROPion XL (24-Hour) . 150 milliGRAM(s) Oral daily  busPIRone 10 milliGRAM(s) Oral two times a day  calcitriol   Capsule 0.25 MICROGram(s) Oral daily  cefTRIAXone   IVPB 1000 milliGRAM(s) IV Intermittent every 24 hours  cholecalciferol 2000 Unit(s) Oral daily  diVALproex  milliGRAM(s) Oral <User Schedule>  ferrous    sulfate 325 milliGRAM(s) Oral daily  finasteride 5 milliGRAM(s) Oral daily  folic acid 1 milliGRAM(s) Oral daily  furosemide   Injectable 60 milliGRAM(s) IV Push two times a day  levothyroxine 125 MICROGram(s) Oral daily  nicotine -  14 mG/24Hr(s) Patch 1 patch Transdermal daily  pantoprazole    Tablet 40 milliGRAM(s) Oral before breakfast  risperiDONE   Tablet 2 milliGRAM(s) Oral daily  sevelamer carbonate 800 milliGRAM(s) Oral three times a day with meals  simvastatin 20 milliGRAM(s) Oral at bedtime  tamsulosin 0.4 milliGRAM(s) Oral at bedtime    MEDICATIONS  (PRN):  acetaminophen     Tablet .. 650 milliGRAM(s) Oral every 6 hours PRN Temp greater or equal to 38C (100.4F), Mild Pain (1 - 3)  ALBUTerol    90 MICROgram(s) HFA Inhaler 2 Puff(s) Inhalation every 6 hours PRN Shortness of Breath and/or Wheezing  magnesium hydroxide Suspension 30 milliLiter(s) Oral at bedtime PRN Constipation      PHYSICAL EXAM:  GENERAL: NAD, well-groomed, well-developed  NEURO: AAOx3, BIJAN b/l, MAEx4  LUNG: Lungs dim b/l, +crackles b/l to mid lung  HEART: S1, S2, no S3 or S4. Regular rate and rhythm. No murmurs, gallops, or rubs. Cannot assess JVD  ABDOMEN: Bowel sounds present, abd Soft, Nontender, Nondistended  EXTREMITIES:  +2 b/l LE edema to knee  SKIN: Darkened skin b/l LE likely from venous stasis.    Consultant(s) Notes Reviewed:  [x ] YES  [ ] NO  Care Discussed with Consultants/Other Providers [ x] YES  [ ] NO    LABS:                        8.8    13.93 )-----------( 263      ( 15 Feb 2022 05:43 )             28.1     02-15    142  |  105  |  53<H>  ----------------------------<  100<H>  4.6   |  34<H>  |  3.95<H>    Ca    9.0      15 Feb 2022 05:43  Phos  5.5     02-15  Mg     2.9     02-15    TPro  7.3  /  Alb  2.3<L>  /  TBili  0.2  /  DBili  x   /  AST  6<L>  /  ALT  10  /  AlkPhos  80  02-15    PT/INR - ( 14 Feb 2022 18:27 )   PT: 12.0 sec;   INR: 1.01 ratio         PTT - ( 14 Feb 2022 18:27 )  PTT:33.2 sec    RADIOLOGY & ADDITIONAL TESTS:    EKG:

## 2022-02-15 NOTE — CONSULT NOTE ADULT - SUBJECTIVE AND OBJECTIVE BOX
C A R D I O L O G Y  *********************    DATE OF SERVICE: 02-15-22    HISTORY OF PRESENT ILLNESS: HPI:  Patient is 62 years old Morbid obese male from [facility] with past medical history of ambulates with walker has past medical hx of COPD not on O2 at home, normal LV/ RV function  hypothyroidism, anemia, left DVT, CKD, BPH, HLD, bipolar disorder, ambulatory dysfunction, lymphedema was brought to ED due to shortness of breath. Patient is not good historian. Patient states that he has this problem usually but he gets anxious and that's why it gets worse as he mentioned. Patient mentioned that he usually use 2L O2 at night. [facility] and was started on BIPAP but for some insurance reasons, Patient wasn't discharged on BIPAP. Patient was found to have sepsis in ED as well and Antibiotics was given. CXR showed pulmonary edema and patient looks overloaded. During time of examination, Patient is AAox2. His speech was unclear. on 4L Oxygen NC. Patient denied chest pain, abdominal pain, leg pain, N/V or change in bowel movement. No CP, NO LOC      PAST MEDICAL & SURGICAL HISTORY:  Hypothyroid    Hyperlipidemia    Ambulatory dysfunction    Anemia    History of BPH    CKD (chronic kidney disease)    Chronic obstructive pulmonary disease (COPD)    Bipolar disorder            MEDICATIONS:  MEDICATIONS  (STANDING):  aspirin  chewable 81 milliGRAM(s) Oral daily  azithromycin  IVPB 500 milliGRAM(s) IV Intermittent every 24 hours  budesonide 160 MICROgram(s)/formoterol 4.5 MICROgram(s) Inhaler 2 Puff(s) Inhalation two times a day  buPROPion XL (24-Hour) . 150 milliGRAM(s) Oral daily  busPIRone 10 milliGRAM(s) Oral two times a day  calcitriol   Capsule 0.25 MICROGram(s) Oral daily  cefTRIAXone   IVPB 1000 milliGRAM(s) IV Intermittent every 24 hours  cholecalciferol 2000 Unit(s) Oral daily  diVALproex  milliGRAM(s) Oral <User Schedule>  ferrous    sulfate 325 milliGRAM(s) Oral daily  finasteride 5 milliGRAM(s) Oral daily  folic acid 1 milliGRAM(s) Oral daily  furosemide   Injectable 40 milliGRAM(s) IV Push daily  levothyroxine 125 MICROGram(s) Oral daily  nicotine -  14 mG/24Hr(s) Patch 1 patch Transdermal daily  pantoprazole    Tablet 40 milliGRAM(s) Oral before breakfast  risperiDONE   Tablet 2 milliGRAM(s) Oral daily  sevelamer carbonate 800 milliGRAM(s) Oral three times a day with meals  simvastatin 20 milliGRAM(s) Oral at bedtime  tamsulosin 0.4 milliGRAM(s) Oral at bedtime      Allergies    No Known Allergies    Intolerances        FAMILY HISTORY:    Non-contributary for premature coronary disease or sudden cardiac death    SOCIAL HISTORY:    [X ] Non-smoker  [ ] Smoker  [ ] Alcohol        REVIEW OF SYSTEMS:  [ ]chest pain  [  ]shortness of breath  [  ]palpitations  [  ]syncope  [ ]near syncope [ ]upper extremity weakness   [ ] lower extremity weakness  [  ]diplopia  [  ]altered mental status   [  ]fevers  [ ]chills [ ]nausea  [ ]vomitting  [  ]dysphagia    [ ]abdominal pain  [ ]melena  [ ]BRBPR    [  ]epistaxis  [  ]rash    [ X]lower extremity edema        [X] All others negative	  [ ] Unable to obtain      LABS:	 	    CARDIAC MARKERS:        Troponin I, High Sensitivity Result: 21.3 ng/L (02-14-22 @ 18:27)                            8.8    13.93 )-----------( 263      ( 15 Feb 2022 05:43 )             28.1     Hb Trend: 8.8<--, 9.6<--    02-15    142  |  105  |  53<H>  ----------------------------<  100<H>  4.6   |  34<H>  |  3.95<H>    Ca    9.0      15 Feb 2022 05:43  Phos  5.5     02-15  Mg     2.9     02-15    TPro  7.3  /  Alb  2.3<L>  /  TBili  0.2  /  DBili  x   /  AST  6<L>  /  ALT  10  /  AlkPhos  80  02-15    Creatinine Trend: 3.95<--, 3.82<--    Coags:  PT/INR - ( 14 Feb 2022 18:27 )   PT: 12.0 sec;   INR: 1.01 ratio         PTT - ( 14 Feb 2022 18:27 )  PTT:33.2 sec    proBNP: Serum Pro-Brain Natriuretic Peptide: 1228 pg/mL (02-14 @ 18:27)        PHYSICAL EXAM:  T(C): 37.2 (02-15-22 @ 07:00), Max: 38.2 (02-14-22 @ 18:51)  HR: 91 (02-15-22 @ 07:00) (78 - 105)  BP: 109/60 (02-15-22 @ 07:00) (98/46 - 180/156)  RR: 19 (02-15-22 @ 07:00) (17 - 21)  SpO2: 97% (02-15-22 @ 07:00) (94% - 98%)  Wt(kg): --   BMI (kg/m2): 45.9 (02-14-22 @ 17:47)  I&O's Summary    14 Feb 2022 07:01  -  15 Feb 2022 07:00  --------------------------------------------------------  IN: 0 mL / OUT: 600 mL / NET: -600 mL        Gen: Appears well in NAD  HEENT:  (-)icterus (-)pallor  CV: N S1 S2 1/6 ABIGAIL (+)2 Pulses B/l  Resp:  Clear to ausculatation B/L, normal effort  GI: (+) BS Soft, NT, ND  Lymph:  (+)Edema/ Stasis , (-)obvious lymphadenopathy  Skin: Warm to touch, Normal turgor  Psych: Appropriate mood and affect       ECG:  	sinus 98 BPM,, RBBB, LAD     RADIOLOGY:         CXR:   < from: Xray Chest 1 View- PORTABLE-Routine (Xray Chest 1 View- PORTABLE-Routine in AM.) (09.01.21 @ 07:00) >  Atelectasis. No evidence of pulmonary edema.    < end of copied text >       ASSESSMENT/PLAN: 	62y Male Morbid obese male from [facility] with past medical history of ambulates with walker has past medical hx of COPD not on O2 at home, normal LV/ RV function  hypothyroidism, anemia, left DVT, CKD, BPH, HLD, bipolar disorder, ambulatory dysfunction, lymphedema was brought to ED due to shortness of breath pulmonary vascular congestion.    - Suspect pulmonary vascular congestion is at least in part driven  by Advanced renal disease  - keep net negative increase lasix to 60 mg IV BID  - consider renal eval  - will follow    I once again thank you for allowing me to participate in the care of your patient.  If you have any questions or concerns please do not hesitate to contact me.    Austen Javier MD, [facility]  BEEPER [phone]

## 2022-02-15 NOTE — PROGRESS NOTE ADULT - PROBLEM SELECTOR PLAN 2
- WBCs 14K, Hypotensive, Fever, Tachycardia + suspecting pneumonia   - Lactate is normal   - Patient is at his baseline mental status   - Got Zosyn and Vanc in ED   - Small left base infiltrate, may be CAP  - Cont roceph/azithro  - f/u Cultures  - Monitor Vitals

## 2022-02-16 ENCOUNTER — TRANSCRIPTION ENCOUNTER (OUTPATIENT)
Age: 62
End: 2022-02-16

## 2022-02-16 LAB
ANION GAP SERPL CALC-SCNC: 2 MMOL/L — LOW (ref 5–17)
BASE EXCESS BLDA CALC-SCNC: 6.4 MMOL/L — HIGH (ref -2–3)
BLOOD GAS COMMENTS ARTERIAL: SIGNIFICANT CHANGE UP
BUN SERPL-MCNC: 55 MG/DL — HIGH (ref 7–18)
CALCIUM SERPL-MCNC: 9.6 MG/DL — SIGNIFICANT CHANGE UP (ref 8.4–10.5)
CHLORIDE SERPL-SCNC: 106 MMOL/L — SIGNIFICANT CHANGE UP (ref 96–108)
CO2 SERPL-SCNC: 34 MMOL/L — HIGH (ref 22–31)
CREAT SERPL-MCNC: 3.67 MG/DL — HIGH (ref 0.5–1.3)
GLUCOSE SERPL-MCNC: 97 MG/DL — SIGNIFICANT CHANGE UP (ref 70–99)
HCO3 BLDA-SCNC: 35 MMOL/L — HIGH (ref 21–28)
HCT VFR BLD CALC: 27.5 % — LOW (ref 39–50)
HGB BLD-MCNC: 8.8 G/DL — LOW (ref 13–17)
HOROWITZ INDEX BLDA+IHG-RTO: 36 — SIGNIFICANT CHANGE UP
MCHC RBC-ENTMCNC: 32 GM/DL — SIGNIFICANT CHANGE UP (ref 32–36)
MCHC RBC-ENTMCNC: 32.2 PG — SIGNIFICANT CHANGE UP (ref 27–34)
MCV RBC AUTO: 100.7 FL — HIGH (ref 80–100)
NRBC # BLD: 0 /100 WBCS — SIGNIFICANT CHANGE UP (ref 0–0)
PCO2 BLDA: 70 MMHG — CRITICAL HIGH (ref 35–48)
PH BLDA: 7.31 — LOW (ref 7.35–7.45)
PLATELET # BLD AUTO: 249 K/UL — SIGNIFICANT CHANGE UP (ref 150–400)
PO2 BLDA: 74 MMHG — LOW (ref 83–108)
POTASSIUM SERPL-MCNC: 4.2 MMOL/L — SIGNIFICANT CHANGE UP (ref 3.5–5.3)
POTASSIUM SERPL-SCNC: 4.2 MMOL/L — SIGNIFICANT CHANGE UP (ref 3.5–5.3)
RBC # BLD: 2.73 M/UL — LOW (ref 4.2–5.8)
RBC # FLD: 14.3 % — SIGNIFICANT CHANGE UP (ref 10.3–14.5)
SAO2 % BLDA: 96 % — SIGNIFICANT CHANGE UP
SODIUM SERPL-SCNC: 142 MMOL/L — SIGNIFICANT CHANGE UP (ref 135–145)
WBC # BLD: 9.14 K/UL — SIGNIFICANT CHANGE UP (ref 3.8–10.5)
WBC # FLD AUTO: 9.14 K/UL — SIGNIFICANT CHANGE UP (ref 3.8–10.5)

## 2022-02-16 PROCEDURE — 71250 CT THORAX DX C-: CPT | Mod: 26

## 2022-02-16 RX ORDER — HYDROCORTISONE 20 MG
40 TABLET ORAL
Refills: 0 | Status: DISCONTINUED | OUTPATIENT
Start: 2022-02-16 | End: 2022-02-16

## 2022-02-16 RX ADMIN — Medication 60 MILLIGRAM(S): at 06:50

## 2022-02-16 RX ADMIN — Medication 1 MILLIGRAM(S): at 13:24

## 2022-02-16 RX ADMIN — BUDESONIDE AND FORMOTEROL FUMARATE DIHYDRATE 2 PUFF(S): 160; 4.5 AEROSOL RESPIRATORY (INHALATION) at 12:01

## 2022-02-16 RX ADMIN — Medication 650 MILLIGRAM(S): at 08:18

## 2022-02-16 RX ADMIN — SIMVASTATIN 20 MILLIGRAM(S): 20 TABLET, FILM COATED ORAL at 21:00

## 2022-02-16 RX ADMIN — Medication 81 MILLIGRAM(S): at 13:24

## 2022-02-16 RX ADMIN — RISPERIDONE 2 MILLIGRAM(S): 4 TABLET ORAL at 13:24

## 2022-02-16 RX ADMIN — DIVALPROEX SODIUM 500 MILLIGRAM(S): 500 TABLET, DELAYED RELEASE ORAL at 16:18

## 2022-02-16 RX ADMIN — SEVELAMER CARBONATE 800 MILLIGRAM(S): 2400 POWDER, FOR SUSPENSION ORAL at 18:14

## 2022-02-16 RX ADMIN — SEVELAMER CARBONATE 800 MILLIGRAM(S): 2400 POWDER, FOR SUSPENSION ORAL at 13:24

## 2022-02-16 RX ADMIN — CALCITRIOL 0.25 MICROGRAM(S): 0.5 CAPSULE ORAL at 13:25

## 2022-02-16 RX ADMIN — Medication 1 PATCH: at 13:23

## 2022-02-16 RX ADMIN — Medication 325 MILLIGRAM(S): at 13:24

## 2022-02-16 RX ADMIN — Medication 10 MILLIGRAM(S): at 18:14

## 2022-02-16 RX ADMIN — Medication 125 MICROGRAM(S): at 06:49

## 2022-02-16 RX ADMIN — SEVELAMER CARBONATE 800 MILLIGRAM(S): 2400 POWDER, FOR SUSPENSION ORAL at 08:16

## 2022-02-16 RX ADMIN — DIVALPROEX SODIUM 500 MILLIGRAM(S): 500 TABLET, DELAYED RELEASE ORAL at 08:16

## 2022-02-16 RX ADMIN — FINASTERIDE 5 MILLIGRAM(S): 5 TABLET, FILM COATED ORAL at 13:24

## 2022-02-16 RX ADMIN — BUDESONIDE AND FORMOTEROL FUMARATE DIHYDRATE 2 PUFF(S): 160; 4.5 AEROSOL RESPIRATORY (INHALATION) at 21:01

## 2022-02-16 RX ADMIN — Medication 2000 UNIT(S): at 13:25

## 2022-02-16 RX ADMIN — TAMSULOSIN HYDROCHLORIDE 0.4 MILLIGRAM(S): 0.4 CAPSULE ORAL at 21:00

## 2022-02-16 RX ADMIN — Medication 60 MILLIGRAM(S): at 18:14

## 2022-02-16 RX ADMIN — Medication 650 MILLIGRAM(S): at 09:59

## 2022-02-16 RX ADMIN — POLYETHYLENE GLYCOL 3350 17 GRAM(S): 17 POWDER, FOR SOLUTION ORAL at 13:25

## 2022-02-16 RX ADMIN — AZITHROMYCIN 255 MILLIGRAM(S): 500 TABLET, FILM COATED ORAL at 20:18

## 2022-02-16 RX ADMIN — CEFTRIAXONE 100 MILLIGRAM(S): 500 INJECTION, POWDER, FOR SOLUTION INTRAMUSCULAR; INTRAVENOUS at 20:16

## 2022-02-16 RX ADMIN — Medication 650 MILLIGRAM(S): at 21:01

## 2022-02-16 RX ADMIN — Medication 40 MILLIGRAM(S): at 18:13

## 2022-02-16 RX ADMIN — BUPROPION HYDROCHLORIDE 150 MILLIGRAM(S): 150 TABLET, EXTENDED RELEASE ORAL at 13:25

## 2022-02-16 RX ADMIN — PANTOPRAZOLE SODIUM 40 MILLIGRAM(S): 20 TABLET, DELAYED RELEASE ORAL at 06:49

## 2022-02-16 RX ADMIN — Medication 650 MILLIGRAM(S): at 22:21

## 2022-02-16 RX ADMIN — Medication 10 MILLIGRAM(S): at 06:49

## 2022-02-16 NOTE — DISCHARGE NOTE PROVIDER - PROVIDER TOKENS
PROVIDER:[TOKEN:[[id number]],FOLLOWUP:[1 week],ESTABLISHEDPATIENT:[T]],PROVIDER:[TOKEN:[[id number]],FOLLOWUP:[1 week],ESTABLISHEDPATIENT:[T]]

## 2022-02-16 NOTE — PROGRESS NOTE ADULT - ASSESSMENT
62 years old Morbid obese male from [facility] ambulates with walker has past medical hx of COPD not on O2 at home, hypothyroidism, anemia, left DVT, CKD, BPH, HLD, bipolar disorder, ambulatory dysfunction, lymphedema presented with shortness of breath.   Patient states uses oxygen  2L at night. Patient was admitted to [facility] before in August and was recommended  BIPAP however never used     In ED Patient's oxygen saturation in 80s, WBC 14K, Hypotensive, tachycardic, febrile   Patient received IV Zosyn and Vancomycin   CXR showed pulmonary edema with questionable left base infiltrate.  Patient got 60mg IV lasix     Patient was admitted for acute hypoxia respiratory failure due to COPD/CHF exacerbation with suspected underlying PNA   ID and cardiology consulted. Started on Zithromax and ceftriaxone   Lasix 60 mg IV BID continued     Pt seen at bedside saturating 94% on 2 L NC, NAD. Afebrile, WBC normal 9.4  TTE with normal left ventricular systolic function,  (EF = 55 to 60%). Normal diastolic function.   62 years old Morbid obese male from [facility] ambulates with walker has past medical hx of COPD not on O2 at home, hypothyroidism, anemia, left DVT, CKD, BPH, HLD, bipolar disorder, ambulatory dysfunction, lymphedema presented with shortness of breath.   Patient states uses oxygen  2L at night. Patient was admitted to [facility] before in August and was recommended  BIPAP however never used     In ED Patient's oxygen saturation in 80s, WBC 14K, Hypotensive, tachycardic, febrile   Patient received IV Zosyn and Vancomycin   CXR showed pulmonary edema with questionable left base infiltrate.  Patient got 60mg IV lasix     Patient was admitted for acute hypoxia respiratory failure due to COPD/CHF exacerbation with suspected underlying PNA   ID and cardiology consulted. Started on Zithromax and ceftriaxone   Lasix 60 mg IV BID continued     Pt seen at bedside saturating 94% on 4 L NC, co intermittent SOB. Afebrile, WBC normal 9.4  Pt was placed on bipap   TTE with normal left ventricular systolic function,  (EF = 55 to 60%). Normal diastolic function.

## 2022-02-16 NOTE — PROGRESS NOTE ADULT - PROBLEM SELECTOR PLAN 2
p/w tachycardia, fever, hypotension, CXR with left base infiltrates, suspected PNA   sepsis resolved   continue Zithromax, Ceftriaxone   f/u Cultures  supplemental oxygen 2-4 L NC p/w tachycardia, fever, hypotension, CXR with left base infiltrates, suspected PNA   sepsis resolved   continue Zithromax, Ceftriaxone   f/u Cultures  ID on board

## 2022-02-16 NOTE — DISCHARGE NOTE PROVIDER - CARE PROVIDER_API CALL
Serina Phillips)  Internal Medicine  [address] [place] [address]  Phone: [phone]  Fax: [phone]  Established Patient  Follow Up Time: 1 week    Owen Phillips)  Medicine  [address] [place] [address]  Phone: [phone]  Fax: [phone]  Established Patient  Follow Up Time: 1 week

## 2022-02-16 NOTE — DISCHARGE NOTE PROVIDER - NSDCCPCAREPLAN_GEN_ALL_CORE_FT
PRINCIPAL DISCHARGE DIAGNOSIS  Diagnosis: Acute exacerbation of CHF (congestive heart failure)  Assessment and Plan of Treatment:       SECONDARY DISCHARGE DIAGNOSES  Diagnosis: Sepsis  Assessment and Plan of Treatment:     Diagnosis: Consolidation lung  Assessment and Plan of Treatment:     Diagnosis: CKD (chronic kidney disease)  Assessment and Plan of Treatment:      PRINCIPAL DISCHARGE DIAGNOSIS  Diagnosis: Acute exacerbation of CHF (congestive heart failure)  Assessment and Plan of Treatment: Weigh yourself daily.If you gain 3lbs in 3 days, or 5lbs in a week call your Health Care Provider.Do not eat or drink foods containing more than 2000mg of salt (sodium) in your diet every day. Continue to take your medications as prescribed.  Call your Health Care Provider if you have any swelling or increased swelling in your feet, ankles, and/or stomach.  Take all of your medication as directed.  If you become dizzy call your Health Care Provider.        SECONDARY DISCHARGE DIAGNOSES  Diagnosis: Sepsis  Assessment and Plan of Treatment: Take all antibiotics as ordered.  Call you Health care provider upon arrival home to make a one week follow up appointment.  If you develop fever, chills, malaise, or change in mental status call your Health Care Provider or go to the Emergency Department.  Nutrition is important, eat small frequent meals to help ensure you get adequate calories.  Do not stay in bed all day!  Increase your activity daily as tolerated.      Diagnosis: Consolidation lung  Assessment and Plan of Treatment: Pneumonia is a lung infection that can cause a fever, cough, and trouble breathing. You had imaging of the chest which showes consolidation. You received azithromycin and ceftriaxone in the hospital. You completed a 5 day course or Azithromycin in the hospital and you will continue Ceftin orally in exchenge of Ceftriaxone for 2 additional days to complete a 7 day course of ceftin/ceftriaxone.  Nutrition is important, eat small frequent meals.  Get lots of rest and drink fluids.  Call your health care provider upon arrival home from hospital and make a follow up appointment for one week.  If your cough worsens, you develop fever greater than 101', you have shaking chills, a fast heartbeat, trouble breathing and/or feel your are breathing much faster than usual, call your healthcare provider.  Make sure you wash your hands frequently.      Diagnosis: CKD (chronic kidney disease)  Assessment and Plan of Treatment: Avoid taking (NSAIDs) - (ex: Ibuprofen, Advil, Celebrex, Naprosyn)  Avoid taking any nephrotoxic agents (can harm kidneys) - Intravenous contrast for diagnostic testing, combination cold medications.  Have all medications adjusted for your renal function by your Health Care Provider.  Blood pressure control is important.  Take all medication as prescribed.

## 2022-02-16 NOTE — DISCHARGE NOTE PROVIDER - NSDCMRMEDTOKEN_GEN_ALL_CORE_FT
aspirin 81 mg oral tablet: 1 tab(s) orally once a day  buPROPion 75 mg oral tablet: 1 tab(s) orally 2 times a day  busPIRone 10 mg oral tablet: 1 tab(s) orally 2 times a day  calcitriol 0.25 mcg oral capsule: 1 cap(s) orally once a day  Dilaudid 4 mg oral tablet: 1 tab(s) orally once a day  divalproex sodium 500 mg oral tablet, extended release: 1 tab(s) orally 2 times a day  FeroSul 325 mg (65 mg elemental iron) oral tablet: 1 tab(s) orally once a day   folic acid 1 mg oral tablet: 1 tab(s) orally once a day  furosemide 40 mg oral tablet: 1 tab(s) orally once a day  lidocaine 4% topical film: Apply topically to affected area once a day  Milk of Magnesia 8% oral suspension: 30 milliliter(s) orally once a day M W F  nicotine 14 mg/24 hr transdermal film, extended release: 1 patch transdermal once a day  pantoprazole 40 mg oral delayed release tablet: 1 tab(s) orally once a day (before a meal)  ProAir HFA 90 mcg/inh inhalation aerosol: 2 puff(s) inhaled every 8 hours  Proscar 5 mg oral tablet: 1 tab(s) orally once a day  RisperDAL 2 mg oral tablet: 1 tab(s) orally once a day 8pm  Senna 8.6 mg oral tablet: 2 tab(s) orally once a day (at bedtime)  sevelamer carbonate 800 mg oral tablet: 1 tab(s) orally 3 times a day (with meals)  Simbrinza 1%- 0.2% ophthalmic suspension: 1 drop(s) to each affected eye 2 times a day Right eye  Symbicort 160 mcg-4.5 mcg/inh inhalation aerosol: 2 puff(s) inhaled 2 times a day  Synthroid 125 mcg (0.125 mg) oral tablet: 1 tab(s) orally once a day  tamsulosin 0.4 mg oral capsule: 1 cap(s) orally once a day  Tylenol 325 mg oral tablet: 2 tab(s) orally 2 times a day  Vitamin D3 25 mcg (1000 intl units) oral tablet: 1 tab(s) orally once a day  Zocor 20 mg oral tablet: 1 tab(s) orally once a day (at bedtime)   aspirin 81 mg oral tablet: 1 tab(s) orally once a day  buPROPion 75 mg oral tablet: 1 tab(s) orally 2 times a day  busPIRone 10 mg oral tablet: 1 tab(s) orally 2 times a day  calcitriol 0.25 mcg oral capsule: 1 cap(s) orally once a day  Ceftin 250 mg oral tablet: 1 tab(s) orally every 12 hours  until Feb 21, 2022  Dilaudid 4 mg oral tablet: 1 tab(s) orally once a day  divalproex sodium 500 mg oral tablet, extended release: 1 tab(s) orally 2 times a day  FeroSul 325 mg (65 mg elemental iron) oral tablet: 1 tab(s) orally once a day   folic acid 1 mg oral tablet: 1 tab(s) orally once a day  furosemide 40 mg oral tablet: 1 tab(s) orally once a day  lidocaine 4% topical film: Apply topically to affected area once a day  Milk of Magnesia 8% oral suspension: 30 milliliter(s) orally once a day M W F  nicotine 14 mg/24 hr transdermal film, extended release: 1 patch transdermal once a day  pantoprazole 40 mg oral delayed release tablet: 1 tab(s) orally once a day (before a meal)  predniSONE 20 mg oral tablet: 2 tab(s) orally once a day  ProAir HFA 90 mcg/inh inhalation aerosol: 2 puff(s) inhaled every 8 hours  Proscar 5 mg oral tablet: 1 tab(s) orally once a day  RisperDAL 2 mg oral tablet: 1 tab(s) orally once a day 8pm  Senna 8.6 mg oral tablet: 2 tab(s) orally once a day (at bedtime)  sevelamer carbonate 800 mg oral tablet: 1 tab(s) orally 3 times a day (with meals)  Simbrinza 1%- 0.2% ophthalmic suspension: 1 drop(s) to each affected eye 2 times a day Right eye  Symbicort 160 mcg-4.5 mcg/inh inhalation aerosol: 2 puff(s) inhaled 2 times a day  Synthroid 125 mcg (0.125 mg) oral tablet: 1 tab(s) orally once a day  tamsulosin 0.4 mg oral capsule: 1 cap(s) orally once a day  Tylenol 325 mg oral tablet: 2 tab(s) orally 2 times a day  Vitamin D3 25 mcg (1000 intl units) oral tablet: 1 tab(s) orally once a day  Zocor 20 mg oral tablet: 1 tab(s) orally once a day (at bedtime)

## 2022-02-16 NOTE — CONSULT NOTE ADULT - SUBJECTIVE AND OBJECTIVE BOX
NEPHROLOGY MEDICAL CARE, [facility] - Dr. Taj Valenzuela/ Dr. Hazel Solis/ Dr. Cosmo Root/ Dr. Fang Gifford    Patient was seen and examined at bedside.     Consultation requested by:  Owen Phillips    Reason for Consult:     HPI:  Patient is 62 years old Morbid obese male from [facility] with past medical history of ambulates with walker has past medical hx of COPD not on O2 at home, hypothyroidism, anemia, left DVT, CKD, BPH, HLD, bipolar disorder, ambulatory dysfunction, lymphedema was brought to ED due to shortness of breath. Patient is not good historian. Patient states that he has this problem usually but he gets anxious and that's why it gets worse as he mentioned. Patient mentioned that he usually use 2L O2 at night. Patient was admitted to [facility] before in August and was started on BIPAP but for some insurance reasons, Patient wasn't discharged on BIPAP. Patient was found to have sepsis in ED as well and Antibiotics was given. CXR showed pulmonary edema and patient looks overloaded. During time of examination, Patient is AAox2. His speech was unclear. on 4L Oxygen NC. Patient denied chest pain, abdominal pain, leg pain, N/V or change in bowel movement. (2022 21:20).   Renal consulted for SHEYLA on CKD and pt is poor historian. Patient was admitted with scr around 3.8mg/dL. Previous scr was around 3.1mg/dL in 2019. Pt received lasix 40mg iv bid due to hypervolemic state. Pt has baseline around 3s. Patient did not receive IV contrast during hospital stay. Patient denies chest pain, palpitations, sob or other symptoms.        PMH:   No pertinent past medical history    Hypothyroid    Hyperlipidemia    Schizophrenia    Schizoaffective disorder    Ambulatory dysfunction    Anemia    History of BPH    CKD (chronic kidney disease)    Chronic obstructive pulmonary disease (COPD)    Bipolar disorder    Bipolar disorder        PSH:   No significant past surgical history        FAMILY HISTORY:      Social History:  non-smoker/ non-alcoholic     Home Meds:  Home Medications:  aspirin 81 mg oral tablet: 1 tab(s) orally once a day (2022 21:14)  buPROPion 75 mg oral tablet: 1 tab(s) orally 2 times a day (2022 21:14)  busPIRone 10 mg oral tablet: 1 tab(s) orally 2 times a day (2022 21:14)  calcitriol 0.25 mcg oral capsule: 1 cap(s) orally once a day (2022 21:14)  Dilaudid 4 mg oral tablet: 1 tab(s) orally once a day (2022 21:14)  divalproex sodium 500 mg oral tablet, extended release: 1 tab(s) orally 2 times a day (2022 21:14)  folic acid 1 mg oral tablet: 1 tab(s) orally once a day (2022 21:14)  furosemide 40 mg oral tablet: 1 tab(s) orally once a day (2022 21:14)  lidocaine 4% topical film: Apply topically to affected area once a day (2022 21:14)  Milk of Magnesia 8% oral suspension: 30 milliliter(s) orally once a day M W F (2022 21:14)  nicotine 14 mg/24 hr transdermal film, extended release: 1 patch transdermal once a day (2022 21:14)  pantoprazole 40 mg oral delayed release tablet: 1 tab(s) orally once a day (before a meal) (2022 21:14)  ProAir HFA 90 mcg/inh inhalation aerosol: 2 puff(s) inhaled every 8 hours (2022 21:14)  Proscar 5 mg oral tablet: 1 tab(s) orally once a day (2022 21:14)  RisperDAL 2 mg oral tablet: 1 tab(s) orally once a day 8pm (2022 21:14)  Senna 8.6 mg oral tablet: 2 tab(s) orally once a day (at bedtime) (2022 21:14)  sevelamer carbonate 800 mg oral tablet: 1 tab(s) orally 3 times a day (with meals) (2022 21:14)  Simbrinza 1%- 0.2% ophthalmic suspension: 1 drop(s) to each affected eye 2 times a day Right eye (2022 21:14)  Symbicort 160 mcg-4.5 mcg/inh inhalation aerosol: 2 puff(s) inhaled 2 times a day (2022 21:14)  Synthroid 125 mcg (0.125 mg) oral tablet: 1 tab(s) orally once a day (2022 21:14)  tamsulosin 0.4 mg oral capsule: 1 cap(s) orally once a day (2022 21:14)  Tylenol 325 mg oral tablet: 2 tab(s) orally 2 times a day (2022 21:14)  Vitamin D3 25 mcg (1000 intl units) oral tablet: 1 tab(s) orally once a day (2022 21:14)  Zocor 20 mg oral tablet: 1 tab(s) orally once a day (at bedtime) (2022 21:14)      Allergies:  Allergies    No Known Allergies    Intolerances        REVIEW OF SYSTEMS:  pt is poor historian so unable obtain.     Vital Signs Last 24 Hrs  T(C): 36.4 (2022 14:31), Max: 36.8 (2022 04:47)  T(F): 97.6 (2022 14:31), Max: 98.2 (2022 04:47)  HR: 82 (2022 14:31) (82 - 95)  BP: 133/65 (2022 14:31) (116/65 - 164/67)  BP(mean): 80 (15 Feb 2022 15:45) (80 - 80)  RR: 18 (2022 14:31) (17 - 19)  SpO2: 94% (2022 14:31) (94% - 97%)    02-15 @ 07:  -   @ 07:00  --------------------------------------------------------  IN: 480 mL / OUT: 975 mL / NET: -495 mL     @ 07:  -  16 @ 14:39  --------------------------------------------------------  IN: 0 mL / OUT: 400 mL / NET: -400 mL          PHYSICAL EXAM:  General: No acute respiratory distress; obese  Eyes: conjunctiva and sclera clear  ENMT: Atraumatic, Normocephalic, supple, No JVD present. Moist mucous membranes  Respiratory: b/l poor air entry; No rales, rhonchi, wheezing  Cardiovasular: S1S2+; no m/r/g  Gastrointestinal: Soft, Non-tender, Nondistended; Bowel sounds present  Neuro:  Awake, Alert & Oriented X3  Ext:  2+ pedal edema with chronic venous stasis; No Cyanosis  Skin: chronic vascular changes of the b/l legs.          LABS:                        8.8    9.14  )-----------( 249      ( 2022 06:37 )             27.5     02-16    142  |  106  |  55<H>  ----------------------------<  97  4.2   |  34<H>  |  3.67<H>    Ca    9.6      2022 06:37  Phos  5.5     02-15  Mg     2.9     02-15    TPro  7.3  /  Alb  2.3<L>  /  TBili  0.2  /  DBili  x   /  AST  6<L>  /  ALT  10  /  AlkPhos  80  02-15    PT/INR - ( 2022 18:27 )   PT: 12.0 sec;   INR: 1.01 ratio         PTT - ( 2022 18:27 )  PTT:33.2 sec  Urinalysis Basic - ( 2022 18:27 )    Color: Yellow / Appearance: Clear / S.010 / pH: x  Gluc: x / Ketone: Negative  / Bili: Negative / Urobili: Negative   Blood: x / Protein: 30 mg/dL / Nitrite: Negative   Leuk Esterase: Negative / RBC: 0-2 /HPF / WBC 0-2 /HPF   Sq Epi: x / Non Sq Epi: x / Bacteria: Trace /HPF        Urine studies      Medications:  MEDICATIONS  (STANDING):  aspirin  chewable 81 milliGRAM(s) Oral daily  azithromycin  IVPB 500 milliGRAM(s) IV Intermittent every 24 hours  budesonide 160 MICROgram(s)/formoterol 4.5 MICROgram(s) Inhaler 2 Puff(s) Inhalation two times a day  buPROPion XL (24-Hour) . 150 milliGRAM(s) Oral daily  busPIRone 10 milliGRAM(s) Oral two times a day  calcitriol   Capsule 0.25 MICROGram(s) Oral daily  cefTRIAXone   IVPB 1000 milliGRAM(s) IV Intermittent every 24 hours  cholecalciferol 2000 Unit(s) Oral daily  diVALproex  milliGRAM(s) Oral <User Schedule>  ferrous    sulfate 325 milliGRAM(s) Oral daily  finasteride 5 milliGRAM(s) Oral daily  folic acid 1 milliGRAM(s) Oral daily  furosemide   Injectable 60 milliGRAM(s) IV Push two times a day  levothyroxine 125 MICROGram(s) Oral daily  nicotine -  14 mG/24Hr(s) Patch 1 patch Transdermal daily  pantoprazole    Tablet 40 milliGRAM(s) Oral before breakfast  polyethylene glycol 3350 17 Gram(s) Oral daily  risperiDONE   Tablet 2 milliGRAM(s) Oral daily  sevelamer carbonate 800 milliGRAM(s) Oral three times a day with meals  simvastatin 20 milliGRAM(s) Oral at bedtime  tamsulosin 0.4 milliGRAM(s) Oral at bedtime    MEDICATIONS  (PRN):  acetaminophen     Tablet .. 650 milliGRAM(s) Oral every 6 hours PRN Temp greater or equal to 38C (100.4F), Mild Pain (1 - 3)  ALBUTerol    90 MICROgram(s) HFA Inhaler 2 Puff(s) Inhalation every 6 hours PRN Shortness of Breath and/or Wheezing  magnesium hydroxide Suspension 30 milliLiter(s) Oral at bedtime PRN Constipation

## 2022-02-16 NOTE — CONSULT NOTE ADULT - ASSESSMENT
1. SHEYLA due to possible CRS  -scr is improving with stable electrolytes and close to baseline. Will accept higher bun/cr to optimize volume and respiratory status. continue lasix 60mg iv bid.    -Adjust meds to eGFR and avoid IV Gadolinium contrast,NSAIDs, and phosphate enema.  -Monitor I/O's daily.   -Monitor SMA daily.  2. CKD stage 4 most likely due to ischemic nephropathy  -baseline scr around 3.1mg/dL  -Keep patient euvolemic and renal diet  -Avoid Nephrotoxic Meds/ Agents such as (NSAIDs, IV contrast, Aminoglycosides such as gentamicin, -Gadolinium contrast, Phosphate containing enemas, etc..)  -Adjust Medications according to eGFR  3. Anemia:  -on iron tabs.   -F/u CBC daily  -transfuse if HB < 7.0.  3. Mineral Bone Disease:  -phos is elevated and on renvela 1 tab tid and on calcitriol 0.25mcg daily; check PTH in am.   4. Respiratory Acidosis: on bipap prn and echo noted. on lasix. cardiology consulted.     Discussed the assessment and plan with Primary Team/Nurse
Pneumonia - LLL  Fever - low grade x 1   Leukocytosis - normalized    Plan - Cont Rocephin 1 gm iv q24hrs  Cont Azithromycin 500mgs iv q24hrs  will switch to po abxs at time of discharge.

## 2022-02-16 NOTE — PHARMACOTHERAPY INTERVENTION NOTE - COMMENTS
Hydrocortisone (SOLU-CORTWF) 40 mg IV PUSH twice a day changed to Methylprednisolone (SOLU-MEDROL) 40 mg IV PUSH twice a day Hydrocortisone (SOLU-CORTEF) 40 mg IV PUSH twice a day changed to Methylprednisolone (SOLU-MEDROL) 40 mg IV PUSH twice a day

## 2022-02-16 NOTE — PROGRESS NOTE ADULT - SUBJECTIVE AND OBJECTIVE BOX
C A R D I O L O G Y  **********************************     DATE OF SERVICE: 02-16-22    Patient denies chest pain or shortness of breath.   Review of systems otherwise (-)  	  MEDICATIONS:  MEDICATIONS  (STANDING):  aspirin  chewable 81 milliGRAM(s) Oral daily  azithromycin  IVPB 500 milliGRAM(s) IV Intermittent every 24 hours  budesonide 160 MICROgram(s)/formoterol 4.5 MICROgram(s) Inhaler 2 Puff(s) Inhalation two times a day  buPROPion XL (24-Hour) . 150 milliGRAM(s) Oral daily  busPIRone 10 milliGRAM(s) Oral two times a day  calcitriol   Capsule 0.25 MICROGram(s) Oral daily  cefTRIAXone   IVPB 1000 milliGRAM(s) IV Intermittent every 24 hours  cholecalciferol 2000 Unit(s) Oral daily  diVALproex  milliGRAM(s) Oral <User Schedule>  ferrous    sulfate 325 milliGRAM(s) Oral daily  finasteride 5 milliGRAM(s) Oral daily  folic acid 1 milliGRAM(s) Oral daily  furosemide   Injectable 60 milliGRAM(s) IV Push two times a day  levothyroxine 125 MICROGram(s) Oral daily  nicotine -  14 mG/24Hr(s) Patch 1 patch Transdermal daily  pantoprazole    Tablet 40 milliGRAM(s) Oral before breakfast  polyethylene glycol 3350 17 Gram(s) Oral daily  risperiDONE   Tablet 2 milliGRAM(s) Oral daily  sevelamer carbonate 800 milliGRAM(s) Oral three times a day with meals  simvastatin 20 milliGRAM(s) Oral at bedtime  tamsulosin 0.4 milliGRAM(s) Oral at bedtime      LABS:	 	    CARDIAC MARKERS:        Troponin I, High Sensitivity Result: 21.3 ng/L (02-14-22 @ 18:27)                              8.8    9.14  )-----------( 249      ( 16 Feb 2022 06:37 )             27.5     Hemoglobin: 8.8 g/dL (02-16 @ 06:37)  Hemoglobin: 8.8 g/dL (02-15 @ 05:43)  Hemoglobin: 9.6 g/dL (02-14 @ 18:27)      02-16    142  |  106  |  55<H>  ----------------------------<  97  4.2   |  34<H>  |  3.67<H>    Ca    9.6      16 Feb 2022 06:37  Phos  5.5     02-15  Mg     2.9     02-15    TPro  7.3  /  Alb  2.3<L>  /  TBili  0.2  /  DBili  x   /  AST  6<L>  /  ALT  10  /  AlkPhos  80  02-15    Creatinine Trend: 3.67<--, 3.95<--, 3.82<--      PHYSICAL EXAM:  T(C): 36.8 (02-16-22 @ 04:47), Max: 36.8 (02-16-22 @ 04:47)  HR: 91 (02-16-22 @ 04:47) (84 - 95)  BP: 116/65 (02-16-22 @ 04:47) (116/65 - 164/67)  RR: 19 (02-16-22 @ 04:47) (17 - 19)  SpO2: 94% (02-16-22 @ 04:47) (94% - 97%)  Wt(kg): --  I&O's Summary    15 Feb 2022 07:01  -  16 Feb 2022 07:00  --------------------------------------------------------  IN: 480 mL / OUT: 975 mL / NET: -495 mL    16 Feb 2022 07:01  -  16 Feb 2022 12:04  --------------------------------------------------------  IN: 0 mL / OUT: 400 mL / NET: -400 mL      HEENT:  (-)icterus (-)pallor  CV: N S1 S2 1/6 ABIGAIL (+)2 Pulses B/l  Resp:  Clear to ausculatation B/L, normal effort  GI: (+) BS Soft, NT, ND  Lymph:  (+)Edema/ Stasis , (-)obvious lymphadenopathy  Skin: Warm to touch, Normal turgor  Psych: Appropriate mood and affect          ASSESSMENT/PLAN: 	62y Male Morbid obese male from [facility] with past medical history of ambulates with walker has past medical hx of COPD not on O2 at home, normal LV/ RV function  hypothyroidism, anemia, left DVT, CKD, BPH, HLD, bipolar disorder, ambulatory dysfunction, lymphedema was brought to ED due to shortness of breath pulmonary vascular congestion.    - Suspect pulmonary vascular congestion is at least in part driven  by Advanced renal disease  - keep net negative increase lasix to 60 mg IV BID  - consider renal eval  - echo with no interval changes    Austen Javier MD, [facility]  BEEPER [phone]

## 2022-02-16 NOTE — CONSULT NOTE ADULT - SUBJECTIVE AND OBJECTIVE BOX
HPI:  Patient is 62 years old Morbid obese male from [facility] with past medical history of ambulates with walker has past medical hx of COPD not on O2 at home, hypothyroidism, anemia, left DVT, CKD, BPH, HLD, bipolar disorder, ambulatory dysfunction, lymphedema was brought to ED due to shortness of breath. Patient is not good historian. Patient states that he has this problem usually but he gets anxious and that's why it gets worse as he mentioned. Patient mentioned that he usually use 2L O2 at night. Patient was admitted to [facility] before in August and was started on BIPAP but for some insurance reasons, Patient wasn't discharged on BIPAP. Patient was found to have sepsis in ED as well and Antibiotics was given. CXR showed pulmonary edema and patient looks overloaded. During time of examination, Patient is AAox2. His speech was unclear. on 4L Oxygen NC. Patient denied chest pain, abdominal pain, leg pain, N/V or change in bowel movement. (2022 21:20)      PAST MEDICAL & SURGICAL HISTORY:  Hypothyroid    Hyperlipidemia    Ambulatory dysfunction    Anemia    History of BPH    CKD (chronic kidney disease)    Chronic obstructive pulmonary disease (COPD)    Bipolar disorder        No Known Allergies      Meds:  acetaminophen     Tablet .. 650 milliGRAM(s) Oral every 6 hours PRN  ALBUTerol    90 MICROgram(s) HFA Inhaler 2 Puff(s) Inhalation every 6 hours PRN  aspirin  chewable 81 milliGRAM(s) Oral daily  azithromycin  IVPB 500 milliGRAM(s) IV Intermittent every 24 hours  budesonide 160 MICROgram(s)/formoterol 4.5 MICROgram(s) Inhaler 2 Puff(s) Inhalation two times a day  buPROPion XL (24-Hour) . 150 milliGRAM(s) Oral daily  busPIRone 10 milliGRAM(s) Oral two times a day  calcitriol   Capsule 0.25 MICROGram(s) Oral daily  cefTRIAXone   IVPB 1000 milliGRAM(s) IV Intermittent every 24 hours  cholecalciferol 2000 Unit(s) Oral daily  diVALproex  milliGRAM(s) Oral <User Schedule>  ferrous    sulfate 325 milliGRAM(s) Oral daily  finasteride 5 milliGRAM(s) Oral daily  folic acid 1 milliGRAM(s) Oral daily  furosemide   Injectable 60 milliGRAM(s) IV Push two times a day  levothyroxine 125 MICROGram(s) Oral daily  magnesium hydroxide Suspension 30 milliLiter(s) Oral at bedtime PRN  nicotine -  14 mG/24Hr(s) Patch 1 patch Transdermal daily  pantoprazole    Tablet 40 milliGRAM(s) Oral before breakfast  polyethylene glycol 3350 17 Gram(s) Oral daily  risperiDONE   Tablet 2 milliGRAM(s) Oral daily  sevelamer carbonate 800 milliGRAM(s) Oral three times a day with meals  simvastatin 20 milliGRAM(s) Oral at bedtime  tamsulosin 0.4 milliGRAM(s) Oral at bedtime      SOCIAL HISTORY:  Smoker:  YES / NO        PACK YEARS:                         WHEN QUIT?  ETOH use:  YES / NO               FREQUENCY / QUANTITY:  Ilicit Drug use:  YES / NO  Occupation:  Assisted device use (Cane / Walker):  Live with:    FAMILY HISTORY:      VITALS:  Vital Signs Last 24 Hrs  T(C): 36.4 (2022 14:31), Max: 36.8 (2022 04:47)  T(F): 97.6 (2022 14:31), Max: 98.2 (2022 04:47)  HR: 82 (2022 14:31) (82 - 95)  BP: 133/65 (2022 14:31) (116/65 - 164/67)  BP(mean): 80 (15 Feb 2022 15:45) (80 - 80)  RR: 18 (2022 14:31) (17 - 19)  SpO2: 94% (2022 14:31) (94% - 97%)    LABS/DIAGNOSTIC TESTS:                          8.8    9.14  )-----------( 249      ( 2022 06:37 )             27.5     WBC Count: 9.14 K/uL ( @ 06:37)  WBC Count: 13.93 K/uL (02-15 @ 05:43)  WBC Count: 13.79 K/uL ( @ 18:27)          142  |  106  |  55<H>  ----------------------------<  97  4.2   |  34<H>  |  3.67<H>    Ca    9.6      2022 06:37  Phos  5.5     02-15  Mg     2.9     02-15    TPro  7.3  /  Alb  2.3<L>  /  TBili  0.2  /  DBili  x   /  AST  6<L>  /  ALT  10  /  AlkPhos  80  02-15      Urinalysis Basic - ( 2022 18:27 )    Color: Yellow / Appearance: Clear / S.010 / pH: x  Gluc: x / Ketone: Negative  / Bili: Negative / Urobili: Negative   Blood: x / Protein: 30 mg/dL / Nitrite: Negative   Leuk Esterase: Negative / RBC: 0-2 /HPF / WBC 0-2 /HPF   Sq Epi: x / Non Sq Epi: x / Bacteria: Trace /HPF        LIVER FUNCTIONS - ( 15 Feb 2022 05:43 )  Alb: 2.3 g/dL / Pro: 7.3 g/dL / ALK PHOS: 80 U/L / ALT: 10 U/L DA / AST: 6 U/L / GGT: x             PT/INR - ( 2022 18:27 )   PT: 12.0 sec;   INR: 1.01 ratio         PTT - ( 2022 18:27 )  PTT:33.2 sec    LACTATE:    ABG - ABG - ( 2022 04:28 )  pH, Arterial: 7.31  pH, Blood: x     /  pCO2: 70    /  pO2: 74    / HCO3: 35    / Base Excess: 6.4   /  SaO2: 96                  CULTURES:   .Blood Blood-Peripheral   @ 20:45   No growth to date.  --  --      Clean Catch Clean Catch (Midstream)   @ 20:43   No growth  --  --          RADIOLOGY:< from: CT Chest No Cont (22 @ 09:17) >  ACC: [id number] EXAM:  CT CHEST                          PROCEDURE DATE:  2022          INTERPRETATION:  Clinical information: Evaluate for infiltrate.    CT scan of the chest was obtained without administration of intravenous   contrast.    Calcifications are noted within both lobes of the thyroid gland.    No hilar and or mediastinal adenopathy is noted.    Heart is enlarged in size. Calcification of the aortic valve and the   coronary arteries is noted. No pericardial effusion is noted.    No endobronchial lesions are noted. Mucous/secretions are noted in the   trachea. Few small opacities are noted in the left lower lobe. Linear   opacities representing atelectasis are noted within both lungs. Trace   left pleural effusion is noted.    Below the diaphragm, visualized portions of the abdomen are unremarkable.    Degenerative changes of the spine are noted.    IMPRESSION: Few small opacities are noted in the left lower lobe.   Etiology is unclear. Follow-up CT scan is recommended in 3 months to   ensure resolution.    --- End of Report ---            ANKIT LEGGETT MD; Attending Radiologist  This document has been electronically signed. 2022 10:28AM    < end of copied text >        ROS  [  ] UNABLE TO ELICIT               HPI:  Patient is 62 years old Morbid obese male from [facility] with past medical history of ambulates with walker has past medical hx of COPD not on O2 at home, hypothyroidism, anemia, left DVT, CKD, BPH, HLD, bipolar disorder, ambulatory dysfunction, lymphedema was brought to ED due to shortness of breath. Patient is not good historian. Patient states that he has this problem usually but he gets anxious and that's why it gets worse as he mentioned. Patient mentioned that he usually use 2L O2 at night. Patient was admitted to [facility] before in August and was started on BIPAP but for some insurance reasons, Patient wasn't discharged on BIPAP. Patient was found to have sepsis in ED as well and Antibiotics was given. CXR showed pulmonary edema and patient looks overloaded. During time of examination, Patient is AAox2. His speech was unclear. on 4L Oxygen NC. Patient denied chest pain, abdominal pain, leg pain, N/V or change in bowel movement. (2022 21:20)        History as above, pt who is from an assisted living facility, brought to the hospital with SOB and found to be fluid overloaded but also had a low grade fever to 100.7 and Leukocytosis which has normalized now, he c/o chills and sweating but has no more fevers, he has a dry cough and leg swelling but no chest pain, no nausea, vomiting or diarrhea, no urinary symptoms. He is doing better clinically and is on abxs for possible pneumonia.         PAST MEDICAL & SURGICAL HISTORY:  Hypothyroid    Hyperlipidemia    Ambulatory dysfunction    Anemia    History of BPH    CKD (chronic kidney disease)    Chronic obstructive pulmonary disease (COPD)    Bipolar disorder        No Known Allergies      Meds:  acetaminophen     Tablet .. 650 milliGRAM(s) Oral every 6 hours PRN  ALBUTerol    90 MICROgram(s) HFA Inhaler 2 Puff(s) Inhalation every 6 hours PRN  aspirin  chewable 81 milliGRAM(s) Oral daily  azithromycin  IVPB 500 milliGRAM(s) IV Intermittent every 24 hours  budesonide 160 MICROgram(s)/formoterol 4.5 MICROgram(s) Inhaler 2 Puff(s) Inhalation two times a day  buPROPion XL (24-Hour) . 150 milliGRAM(s) Oral daily  busPIRone 10 milliGRAM(s) Oral two times a day  calcitriol   Capsule 0.25 MICROGram(s) Oral daily  cefTRIAXone   IVPB 1000 milliGRAM(s) IV Intermittent every 24 hours  cholecalciferol 2000 Unit(s) Oral daily  diVALproex  milliGRAM(s) Oral <User Schedule>  ferrous    sulfate 325 milliGRAM(s) Oral daily  finasteride 5 milliGRAM(s) Oral daily  folic acid 1 milliGRAM(s) Oral daily  furosemide   Injectable 60 milliGRAM(s) IV Push two times a day  levothyroxine 125 MICROGram(s) Oral daily  magnesium hydroxide Suspension 30 milliLiter(s) Oral at bedtime PRN  nicotine -  14 mG/24Hr(s) Patch 1 patch Transdermal daily  pantoprazole    Tablet 40 milliGRAM(s) Oral before breakfast  polyethylene glycol 3350 17 Gram(s) Oral daily  risperiDONE   Tablet 2 milliGRAM(s) Oral daily  sevelamer carbonate 800 milliGRAM(s) Oral three times a day with meals  simvastatin 20 milliGRAM(s) Oral at bedtime  tamsulosin 0.4 milliGRAM(s) Oral at bedtime      SOCIAL HISTORY:  Smoker:  YES   ETOH use:  denies  Ilicit Drug use:  denies      FAMILY HISTORY: not contributory      VITALS:  Vital Signs Last 24 Hrs  T(C): 36.4 (2022 14:31), Max: 36.8 (2022 04:47)  T(F): 97.6 (2022 14:31), Max: 98.2 (2022 04:47)  HR: 82 (2022 14:31) (82 - 95)  BP: 133/65 (2022 14:31) (116/65 - 164/67)  BP(mean): 80 (15 Feb 2022 15:45) (80 - 80)  RR: 18 (2022 14:31) (17 - 19)  SpO2: 94% (2022 14:31) (94% - 97%)    LABS/DIAGNOSTIC TESTS:                          8.8    9.14  )-----------( 249      ( 2022 06:37 )             27.5     WBC Count: 9.14 K/uL ( @ 06:37)  WBC Count: 13.93 K/uL (02-15 @ 05:43)  WBC Count: 13.79 K/uL ( @ 18:27)          142  |  106  |  55<H>  ----------------------------<  97  4.2   |  34<H>  |  3.67<H>    Ca    9.6      2022 06:37  Phos  5.5     02-15  Mg     2.9     02-15    TPro  7.3  /  Alb  2.3<L>  /  TBili  0.2  /  DBili  x   /  AST  6<L>  /  ALT  10  /  AlkPhos  80  02-15      Urinalysis Basic - ( 2022 18:27 )    Color: Yellow / Appearance: Clear / S.010 / pH: x  Gluc: x / Ketone: Negative  / Bili: Negative / Urobili: Negative   Blood: x / Protein: 30 mg/dL / Nitrite: Negative   Leuk Esterase: Negative / RBC: 0-2 /HPF / WBC 0-2 /HPF   Sq Epi: x / Non Sq Epi: x / Bacteria: Trace /HPF        LIVER FUNCTIONS - ( 15 Feb 2022 05:43 )  Alb: 2.3 g/dL / Pro: 7.3 g/dL / ALK PHOS: 80 U/L / ALT: 10 U/L DA / AST: 6 U/L / GGT: x             PT/INR - ( 2022 18:27 )   PT: 12.0 sec;   INR: 1.01 ratio         PTT - ( 2022 18:27 )  PTT:33.2 sec    LACTATE:    ABG - ABG - ( 2022 04:28 )  pH, Arterial: 7.31  pH, Blood: x     /  pCO2: 70    /  pO2: 74    / HCO3: 35    / Base Excess: 6.4   /  SaO2: 96                  CULTURES:   .Blood Blood-Peripheral   @ 20:45   No growth to date.  --  --      Clean Catch Clean Catch (Midstream)   @ 20:43   No growth  --  --          RADIOLOGY:< from: CT Chest No Cont (22 @ 09:17) >  ACC: [id number] EXAM:  CT CHEST                          PROCEDURE DATE:  2022          INTERPRETATION:  Clinical information: Evaluate for infiltrate.    CT scan of the chest was obtained without administration of intravenous   contrast.    Calcifications are noted within both lobes of the thyroid gland.    No hilar and or mediastinal adenopathy is noted.    Heart is enlarged in size. Calcification of the aortic valve and the   coronary arteries is noted. No pericardial effusion is noted.    No endobronchial lesions are noted. Mucous/secretions are noted in the   trachea. Few small opacities are noted in the left lower lobe. Linear   opacities representing atelectasis are noted within both lungs. Trace   left pleural effusion is noted.    Below the diaphragm, visualized portions of the abdomen are unremarkable.    Degenerative changes of the spine are noted.    IMPRESSION: Few small opacities are noted in the left lower lobe.   Etiology is unclear. Follow-up CT scan is recommended in 3 months to   ensure resolution.    --- End of Report ---            ANKIT LEGGETT MD; Attending Radiologist  This document has been electronically signed. 2022 10:28AM    < end of copied text >        ROS  [  ] UNABLE TO ELICIT

## 2022-02-16 NOTE — PROGRESS NOTE ADULT - PROBLEM SELECTOR PLAN 5
At his baseline   creatinine 3.95--> 3.67  avoid nephrotoxics At his baseline   creatinine 3.95--> 3.67  avoid nephrotoxics  Dr Bianca ko consulted

## 2022-02-16 NOTE — DISCHARGE NOTE PROVIDER - HOSPITAL COURSE
62 years old Morbid obese male from [facility] ambulates with walker has past medical hx of COPD not on O2 at home, hypothyroidism, anemia, left DVT, CKD, BPH, HLD, bipolar disorder, ambulatory dysfunction, lymphedema presented with shortness of breath.   Patient states uses oxygen  2L at night. Patient was admitted to [facility] before in August and was recommended  BIPAP however never used     In ED Patient's oxygen saturation in 80s, WBC 14K, Hypotensive, tachycardic, febrile   Patient received IV Zosyn and Vancomycin   CXR showed pulmonary edema with questionable left base infiltrate.  Patient got 60mg IV lasix     Patient was admitted for acute hypoxia respiratory failure due to COPD/CHF exacerbation with suspected underlying PNA   ID and cardiology consulted. Started on Zithromax, ceftriaxone and solumedrol   Lasix 60 mg IV BID continued   ABG with pH 7.1, pO2 74, pCO2 70  Placed on bipap   TTE with normal left ventricular systolic function,  (EF = 55 to 60%). Normal diastolic function.      INCOMPLETE 62 years old Morbid obese male from [facility] ambulates with walker has past medical hx of COPD not on O2 at home, hypothyroidism, anemia, left DVT, CKD, BPH, HLD, bipolar disorder, ambulatory dysfunction, lymphedema presented with shortness of breath. Patient states uses oxygen 2L at night. Patient was admitted to [facility] before in August and was recommended BIPAP however never used. In ED Patient's oxygen saturation was in the 80s, WBC 14K, Hypotensive, tachycardic, febrile   Patient received IV Zosyn and Vancomycin . CXR showed pulmonary edema with questionable left base infiltrate. Patient received IV lasix for volume overload . Patient was admitted for acute hypoxia respiratory failure due to COPD/CHF exacerbation with suspected underlying PNA. ID and cardiology consulted. Pt was started on Zithromax, ceftriaxone and solumedrol. Patient was started on BIPAP at night and continued on lasix with improvement. TTE was done with normal left ventricular systolic function,  (EF = 55 to 60%). Normal diastolic function. Patient was seen by PT - team recommends [facility].  Patient will continue BIPAP and 40mg Lasix PO QD. Patient completed 5 days of Azithromycin and will transition from Ceftriaxone to Ceftin to complete 7 day course. Patient stable for transfer to [facility] to continue management outpatient.      This is a brief summary of the patient encounter. Please refer to the medical records for complete details. 62 years old Morbid obese male from [facility] ambulates with walker has past medical hx of COPD not on O2 at home, hypothyroidism, anemia, left DVT, CKD, BPH, HLD, bipolar disorder, ambulatory dysfunction, lymphedema presented with shortness of breath. Patient states uses oxygen 2L at night. Patient was admitted to [facility] before in August and was recommended BIPAP however never used. In ED Patient's oxygen saturation was in the 80s, WBC 14K, Hypotensive, tachycardic, febrile   Patient received IV Zosyn and Vancomycin . CXR showed pulmonary edema with questionable left base infiltrate. Patient received IV lasix for volume overload . Patient was admitted for acute hypoxia respiratory failure due to COPD/CHF exacerbation with suspected underlying PNA. ID and cardiology consulted. Pt was started on Zithromax, ceftriaxone and solumedrol. Patient was started on BIPAP at night and continued on lasix with improvement. TTE was done with normal left ventricular systolic function,  (EF = 55 to 60%). Normal diastolic function. Patient was seen by PT - team recommends [facility].  Patient will continue BIPAP and 40mg Lasix PO QD. Patient completed 5 days of Azithromycin and will transition from Ceftriaxone to Ceftin to complete 7 day course. Patient stable for transfer to [facility] to continue management outpatient management under Dr. Phillips at [facility] and [facility]. Patient was tested for covid-19 and has tested negative the following times:    COVID-19 PCR: NotDetec (18 Feb 2022 11:25)  SARS-CoV-2: NotDetec (14 Feb 2022 18:27)        This is a brief summary of the patient encounter. Please refer to the medical records for complete details.

## 2022-02-16 NOTE — PROGRESS NOTE ADULT - PROBLEM SELECTOR PLAN 1
likely due to COPD/ CHF exacerbation with morbid obesity, suspected PNA   continue Lasix 60 mg BID  cardiology Dr. Javier on board   measure I&O   Fluids restrictions   TTE noted as above   monitor oxygen saturation  supplemental oxygen 2-4 L as needed to keep O2 sat >92%   Fall precautions likely due to COPD/ CHF exacerbation with morbid obesity, suspected PNA   continue Lasix 60 mg BID  cardiology Dr. Javier on board   measure I&O   Fluids restrictions   TTE noted as above   monitor oxygen saturation  supplemental oxygen 2-4 L as needed to keep O2 sat >92%   Fall precautions  Dr Morin consulted likely due to COPD/ CHF exacerbation with morbid obesity, suspected PNA   placed on bipap   continue Lasix 60 mg BID  cardiology Dr. Javier on board   measure I&O   Fluids restrictions   TTE noted as above   monitor oxygen saturation  Fall precautions  Dr Morin consulted

## 2022-02-16 NOTE — CONSULT NOTE ADULT - REASON FOR ADMISSION
Shortness of breath / CHF

## 2022-02-16 NOTE — PROGRESS NOTE ADULT - SUBJECTIVE AND OBJECTIVE BOX
Patient is a 62y old  Male who presents with a chief complaint of Shortness of breath / CHF (2022 09:13)    PATIENT IS SEEN AND EXAMINED IN MEDICAL FLOOR.  SOWMYA [    ]    ALYSIA [   ]      GT [   ]    ALLERGIES:  No Known Allergies      Daily     Daily Weight in k.2 (2022 04:47)    VITALS:    Vital Signs Last 24 Hrs  T(C): 36.8 (2022 04:47), Max: 36.8 (2022 04:47)  T(F): 98.2 (2022 04:47), Max: 98.2 (2022 04:47)  HR: 91 (2022 04:47) (84 - 95)  BP: 116/65 (2022 04:47) (116/65 - 164/67)  BP(mean): 80 (15 Feb 2022 15:45) (80 - 80)  RR: 19 (2022 04:47) (17 - 19)  SpO2: 94% (2022 04:47) (94% - 97%)    LABS:    CBC Full  -  ( 2022 06:37 )  WBC Count : 9.14 K/uL  RBC Count : 2.73 M/uL  Hemoglobin : 8.8 g/dL  Hematocrit : 27.5 %  Platelet Count - Automated : 249 K/uL  Mean Cell Volume : 100.7 fl  Mean Cell Hemoglobin : 32.2 pg  Mean Cell Hemoglobin Concentration : 32.0 gm/dL  Auto Neutrophil # : x  Auto Lymphocyte # : x  Auto Monocyte # : x  Auto Eosinophil # : x  Auto Basophil # : x  Auto Neutrophil % : x  Auto Lymphocyte % : x  Auto Monocyte % : x  Auto Eosinophil % : x  Auto Basophil % : x    PT/INR - ( 2022 18:27 )   PT: 12.0 sec;   INR: 1.01 ratio         PTT - ( 2022 18:27 )  PTT:33.2 sec  02-16    142  |  106  |  55<H>  ----------------------------<  97  4.2   |  34<H>  |  3.67<H>    Ca    9.6      2022 06:37  Phos  5.5     02-15  Mg     2.9     02-15    TPro  7.3  /  Alb  2.3<L>  /  TBili  0.2  /  DBili  x   /  AST  6<L>  /  ALT  10  /  AlkPhos  80  02-15    CAPILLARY BLOOD GLUCOSE            LIVER FUNCTIONS - ( 15 Feb 2022 05:43 )  Alb: 2.3 g/dL / Pro: 7.3 g/dL / ALK PHOS: 80 U/L / ALT: 10 U/L DA / AST: 6 U/L / GGT: x           Creatinine Trend: 3.67<--, 3.95<--, 3.82<--  I&O's Summary    15 Feb 2022 07:01  -  2022 07:00  --------------------------------------------------------  IN: 480 mL / OUT: 975 mL / NET: -495 mL    2022 07:01  -  2022 10:31  --------------------------------------------------------  IN: 0 mL / OUT: 400 mL / NET: -400 mL        ABG - ( 2022 04:28 )  pH, Arterial: 7.31  pH, Blood: x     /  pCO2: 70    /  pO2: 74    / HCO3: 35    / Base Excess: 6.4   /  SaO2: 96                  .Blood Blood-Peripheral   @ 20:45   No growth to date.  --  --      Clean Catch Clean Catch (Midstream)   @ 20:43   No growth  --  --          MEDICATIONS:    MEDICATIONS  (STANDING):  aspirin  chewable 81 milliGRAM(s) Oral daily  azithromycin  IVPB 500 milliGRAM(s) IV Intermittent every 24 hours  budesonide 160 MICROgram(s)/formoterol 4.5 MICROgram(s) Inhaler 2 Puff(s) Inhalation two times a day  buPROPion XL (24-Hour) . 150 milliGRAM(s) Oral daily  busPIRone 10 milliGRAM(s) Oral two times a day  calcitriol   Capsule 0.25 MICROGram(s) Oral daily  cefTRIAXone   IVPB 1000 milliGRAM(s) IV Intermittent every 24 hours  cholecalciferol 2000 Unit(s) Oral daily  diVALproex  milliGRAM(s) Oral <User Schedule>  ferrous    sulfate 325 milliGRAM(s) Oral daily  finasteride 5 milliGRAM(s) Oral daily  folic acid 1 milliGRAM(s) Oral daily  furosemide   Injectable 60 milliGRAM(s) IV Push two times a day  levothyroxine 125 MICROGram(s) Oral daily  nicotine -  14 mG/24Hr(s) Patch 1 patch Transdermal daily  pantoprazole    Tablet 40 milliGRAM(s) Oral before breakfast  polyethylene glycol 3350 17 Gram(s) Oral daily  risperiDONE   Tablet 2 milliGRAM(s) Oral daily  sevelamer carbonate 800 milliGRAM(s) Oral three times a day with meals  simvastatin 20 milliGRAM(s) Oral at bedtime  tamsulosin 0.4 milliGRAM(s) Oral at bedtime      MEDICATIONS  (PRN):  acetaminophen     Tablet .. 650 milliGRAM(s) Oral every 6 hours PRN Temp greater or equal to 38C (100.4F), Mild Pain (1 - 3)  ALBUTerol    90 MICROgram(s) HFA Inhaler 2 Puff(s) Inhalation every 6 hours PRN Shortness of Breath and/or Wheezing  magnesium hydroxide Suspension 30 milliLiter(s) Oral at bedtime PRN Constipation      REVIEW OF SYSTEMS:                           ALL ROS DONE [ X   ]    CONSTITUTIONAL:  LETHARGIC [   ], FEVER [   ], UNRESPONSIVE [   ]  CVS:  CP  [   ], SOB, [   ], PALPITATIONS [   ], DIZZYNESS [   ]  RS: COUGH [   ], SPUTUM [   ]  GI: ABDOMINAL PAIN [   ], NAUSEA [   ], VOMITINGS [   ], DIARRHEA [   ], CONSTIPATION [   ]  :  DYSURIA [   ], NOCTURIA [   ], INCREASED FREQUENCY [   ], DRIBLING [   ],  SKELETAL: PAINFUL JOINTS [   ], SWOLLEN JOINTS [   ], NECK ACHE [   ], LOW BACK ACHE [   ],  SKIN : ULCERS [   ], RASH [   ], ITCHING [   ]  CNS: HEAD ACHE [   ], DOUBLE VISION [   ], BLURRED VISION [   ], AMS / CONFUSION [   ], SEIZURES [   ], WEAKNESS [   ],TINGLING / NUMBNESS [   ]      PHYSICAL EXAMINATION:  GENERAL APPEARANCE: NO DISTRESS  HEENT:  NO PALLOR, NO  JVD,  NO   NODES, NECK SUPPLE  CVS: S1 +, S2 +,   RS: AEEB,  OCCASIONAL  RALES +,   NO RONCHI  ABD: SOFT, NT, NO, BS +  EXT: PE +  SKIN: WARM,   SKELETAL:  ROM ACCEPTABLE  CNS:  AAO X 1-2    RADIOLOGY :    ACC: [id number] EXAM:  XR CHEST PORTABLE IMMED 1V                          PROCEDURE DATE:  2022      Frontal expiratory view of the chest shows the heart to be similar in   size. The lungs show more atelectasis bilaterally with questionable small   left base infiltrate and there is no evidence of pneumothorax nor pleural   effusion. Old right rib fractures are again noted.    IMPRESSION:  Questionable left base infiltrate. Follow-up study is recommended as   clinically warranted.      ASSESSMENT :     Heart failure    No pertinent past medical history    Hypothyroid    Hyperlipidemia    Schizophrenia    Schizoaffective disorder    Ambulatory dysfunction    Anemia    History of BPH    CKD (chronic kidney disease)    Chronic obstructive pulmonary disease (COPD)    Bipolar disorder    Bipolar disorder    No significant past surgical history        PLAN:  HPI:  Patient is 62 years old Morbid obese male from [facility] with past medical history of ambulates with walker has past medical hx of COPD not on O2 at home, hypothyroidism, anemia, left DVT, CKD, BPH, HLD, bipolar disorder, ambulatory dysfunction, lymphedema was brought to ED due to shortness of breath. Patient is not good historian. Patient states that he has this problem usually but he gets anxious and that's why it gets worse as he mentioned. Patient mentioned that he usually use 2L O2 at night. Patient was admitted to [facility] before in August and was started on BIPAP but for some insurance reasons, Patient wasn't discharged on BIPAP. Patient was found to have sepsis in ED as well and Antibiotics was given. CXR showed pulmonary edema and patient looks overloaded. During time of examination, Patient is AAox2. His speech was unclear. on 4L Oxygen NC. Patient denied chest pain, abdominal pain, leg pain, N/V or change in bowel movement. (2022 21:20)      # ACUTE HYPOXIC RESPIRATORY FAILURE S/T PULMONARY EDEMA, POSSIBLE PNA - PLACED ON ROCEPHIN AND AZITHROMYCIN, LASIX, F/U ECHOCARDIOGRAM  # CHRONIC COPD, MILD PULM HTN  # ?TEJ ON BIPAP [NONCOMPLIANT]  - F/U BCX  - STRICT IS AND OS  - CARDIOLOGY CONSULT    - F/U CT CHEST    # CKD - MONITOR CR, AVOID NEPHROTOXIC AGENTS   - FLOMAX,  FINASTERIDE  - WILL CONSULT NEPHROLOGY    # CONSTIPATION - PLANNED FOR TWE X 2 DAYS, MIRALAX    # HYPOTHYROIDISM  # MORBID OBESITY  # CHRONIC VENOUS INSUFFICIENCY, HX OF LEFT DVT    # GI AND DVT PPX   Patient is a 62y old  Male who presents with a chief complaint of Shortness of breath / CHF (2022 09:13)    PATIENT IS SEEN AND EXAMINED IN MEDICAL FLOOR.    ALLERGIES:  No Known Allergies      Daily     Daily Weight in k.2 (2022 04:47)    VITALS:    Vital Signs Last 24 Hrs  T(C): 36.8 (2022 04:47), Max: 36.8 (2022 04:47)  T(F): 98.2 (2022 04:47), Max: 98.2 (2022 04:47)  HR: 91 (2022 04:47) (84 - 95)  BP: 116/65 (2022 04:47) (116/65 - 164/67)  BP(mean): 80 (15 Feb 2022 15:45) (80 - 80)  RR: 19 (2022 04:47) (17 - 19)  SpO2: 94% (2022 04:47) (94% - 97%)    LABS:    CBC Full  -  ( 2022 06:37 )  WBC Count : 9.14 K/uL  RBC Count : 2.73 M/uL  Hemoglobin : 8.8 g/dL  Hematocrit : 27.5 %  Platelet Count - Automated : 249 K/uL  Mean Cell Volume : 100.7 fl  Mean Cell Hemoglobin : 32.2 pg  Mean Cell Hemoglobin Concentration : 32.0 gm/dL  Auto Neutrophil # : x  Auto Lymphocyte # : x  Auto Monocyte # : x  Auto Eosinophil # : x  Auto Basophil # : x  Auto Neutrophil % : x  Auto Lymphocyte % : x  Auto Monocyte % : x  Auto Eosinophil % : x  Auto Basophil % : x    PT/INR - ( 2022 18:27 )   PT: 12.0 sec;   INR: 1.01 ratio         PTT - ( 2022 18:27 )  PTT:33.2 sec  02-16    142  |  106  |  55<H>  ----------------------------<  97  4.2   |  34<H>  |  3.67<H>    Ca    9.6      2022 06:37  Phos  5.5     02-15  Mg     2.9     02-15    TPro  7.3  /  Alb  2.3<L>  /  TBili  0.2  /  DBili  x   /  AST  6<L>  /  ALT  10  /  AlkPhos  80  02-15    CAPILLARY BLOOD GLUCOSE            LIVER FUNCTIONS - ( 15 Feb 2022 05:43 )  Alb: 2.3 g/dL / Pro: 7.3 g/dL / ALK PHOS: 80 U/L / ALT: 10 U/L DA / AST: 6 U/L / GGT: x           Creatinine Trend: 3.67<--, 3.95<--, 3.82<--  I&O's Summary    15 Feb 2022 07:01  -  2022 07:00  --------------------------------------------------------  IN: 480 mL / OUT: 975 mL / NET: -495 mL    2022 07:01  -  2022 10:31  --------------------------------------------------------  IN: 0 mL / OUT: 400 mL / NET: -400 mL        ABG - ( 2022 04:28 )  pH, Arterial: 7.31  pH, Blood: x     /  pCO2: 70    /  pO2: 74    / HCO3: 35    / Base Excess: 6.4   /  SaO2: 96                  .Blood Blood-Peripheral   @ 20:45   No growth to date.  --  --      Clean Catch Clean Catch (Midstream)   @ 20:43   No growth  --  --          MEDICATIONS:    MEDICATIONS  (STANDING):  aspirin  chewable 81 milliGRAM(s) Oral daily  azithromycin  IVPB 500 milliGRAM(s) IV Intermittent every 24 hours  budesonide 160 MICROgram(s)/formoterol 4.5 MICROgram(s) Inhaler 2 Puff(s) Inhalation two times a day  buPROPion XL (24-Hour) . 150 milliGRAM(s) Oral daily  busPIRone 10 milliGRAM(s) Oral two times a day  calcitriol   Capsule 0.25 MICROGram(s) Oral daily  cefTRIAXone   IVPB 1000 milliGRAM(s) IV Intermittent every 24 hours  cholecalciferol 2000 Unit(s) Oral daily  diVALproex  milliGRAM(s) Oral <User Schedule>  ferrous    sulfate 325 milliGRAM(s) Oral daily  finasteride 5 milliGRAM(s) Oral daily  folic acid 1 milliGRAM(s) Oral daily  furosemide   Injectable 60 milliGRAM(s) IV Push two times a day  levothyroxine 125 MICROGram(s) Oral daily  nicotine -  14 mG/24Hr(s) Patch 1 patch Transdermal daily  pantoprazole    Tablet 40 milliGRAM(s) Oral before breakfast  polyethylene glycol 3350 17 Gram(s) Oral daily  risperiDONE   Tablet 2 milliGRAM(s) Oral daily  sevelamer carbonate 800 milliGRAM(s) Oral three times a day with meals  simvastatin 20 milliGRAM(s) Oral at bedtime  tamsulosin 0.4 milliGRAM(s) Oral at bedtime      MEDICATIONS  (PRN):  acetaminophen     Tablet .. 650 milliGRAM(s) Oral every 6 hours PRN Temp greater or equal to 38C (100.4F), Mild Pain (1 - 3)  ALBUTerol    90 MICROgram(s) HFA Inhaler 2 Puff(s) Inhalation every 6 hours PRN Shortness of Breath and/or Wheezing  magnesium hydroxide Suspension 30 milliLiter(s) Oral at bedtime PRN Constipation      REVIEW OF SYSTEMS:                           ALL ROS DONE [ X   ]    CONSTITUTIONAL:  LETHARGIC [   ], FEVER [   ], UNRESPONSIVE [   ]  CVS:  CP  [   ], SOB, [   ], PALPITATIONS [   ], DIZZYNESS [   ]  RS: COUGH [   ], SPUTUM [   ]  GI: ABDOMINAL PAIN [   ], NAUSEA [   ], VOMITINGS [   ], DIARRHEA [   ], CONSTIPATION [   ]  :  DYSURIA [   ], NOCTURIA [   ], INCREASED FREQUENCY [   ], DRIBLING [   ],  SKELETAL: PAINFUL JOINTS [   ], SWOLLEN JOINTS [   ], NECK ACHE [   ], LOW BACK ACHE [   ],  SKIN : ULCERS [   ], RASH [   ], ITCHING [   ]  CNS: HEAD ACHE [   ], DOUBLE VISION [   ], BLURRED VISION [   ], AMS / CONFUSION [   ], SEIZURES [   ], WEAKNESS [   ],TINGLING / NUMBNESS [   ]      PHYSICAL EXAMINATION:  GENERAL APPEARANCE: NO DISTRESS  HEENT:  NO PALLOR, NO  JVD,  NO   NODES, NECK SUPPLE  CVS: S1 +, S2 +,   RS: AEEB,  OCCASIONAL  RALES +,   NO RONCHI  ABD: SOFT, NT, NO, BS +  EXT: PE +  SKIN: WARM,   SKELETAL:  ROM ACCEPTABLE  CNS:  AAO X 1-2    RADIOLOGY :    ACC: [id number] EXAM:  XR CHEST PORTABLE IMMED 1V                          PROCEDURE DATE:  2022      Frontal expiratory view of the chest shows the heart to be similar in   size. The lungs show more atelectasis bilaterally with questionable small   left base infiltrate and there is no evidence of pneumothorax nor pleural   effusion. Old right rib fractures are again noted.    IMPRESSION:  Questionable left base infiltrate. Follow-up study is recommended as   clinically warranted.      ASSESSMENT :     Heart failure    No pertinent past medical history    Hypothyroid    Hyperlipidemia    Schizophrenia    Schizoaffective disorder    Ambulatory dysfunction    Anemia    History of BPH    CKD (chronic kidney disease)    Chronic obstructive pulmonary disease (COPD)    Bipolar disorder    Bipolar disorder    No significant past surgical history        PLAN:  HPI:  Patient is 62 years old Morbid obese male from [facility] with past medical history of ambulates with walker has past medical hx of COPD not on O2 at home, hypothyroidism, anemia, left DVT, CKD, BPH, HLD, bipolar disorder, ambulatory dysfunction, lymphedema was brought to ED due to shortness of breath. Patient is not good historian. Patient states that he has this problem usually but he gets anxious and that's why it gets worse as he mentioned. Patient mentioned that he usually use 2L O2 at night. Patient was admitted to [facility] before in August and was started on BIPAP but for some insurance reasons, Patient wasn't discharged on BIPAP. Patient was found to have sepsis in ED as well and Antibiotics was given. CXR showed pulmonary edema and patient looks overloaded. During time of examination, Patient is AAox2. His speech was unclear. on 4L Oxygen NC. Patient denied chest pain, abdominal pain, leg pain, N/V or change in bowel movement. (2022 21:20)      # ACUTE HYPOXIC RESPIRATORY FAILURE S/T PULMONARY EDEMA, POSSIBLE PNA - PLACED ON ROCEPHIN AND AZITHROMYCIN, LASIX, F/U ECHOCARDIOGRAM  # CHRONIC COPD, MILD PULM HTN  # ?TEJ ON BIPAP [NONCOMPLIANT]  - F/U BCX  - STRICT IS AND OS  - CARDIOLOGY CONSULT    - CT CHEST - LLL OPACITIES  - ECHO - NORMAL LV EF, NORMAL MODERATE PULM HTN    - STARTED ON BIPAP, STEROIDS  - PULM CONSULT, ID CONSULT    # CKD - MONITOR CR, AVOID NEPHROTOXIC AGENTS   - FLOMAX,  FINASTERIDE  - WILL CONSULT NEPHROLOGY    # CONSTIPATION - PLANNED FOR TWE X 2 DAYS, MIRALAX    # HYPOTHYROIDISM  # MORBID OBESITY  # CHRONIC VENOUS INSUFFICIENCY, HX OF LEFT DVT    # GI AND DVT PPX

## 2022-02-16 NOTE — CONSULT NOTE ADULT - SUBJECTIVE AND OBJECTIVE BOX
Time of visit:    CHIEF COMPLAINT: Patient is a 62y old  Male who presents with a chief complaint of Shortness of breath / CHF (16 Feb 2022 16:29)      HPI:  Patient is 62 years old Morbid obese male from [facility] with past medical history of ambulates with walker has past medical hx of COPD not on O2 at home, hypothyroidism, anemia, left DVT, CKD, BPH, HLD, bipolar disorder, ambulatory dysfunction, lymphedema was brought to ED due to shortness of breath. Patient is not good historian. Patient states that he has this problem usually but he gets anxious and that's why it gets worse as he mentioned. Patient mentioned that he usually use 2L O2 at night. Patient was admitted to [facility] before in August and was started on BIPAP but for some insurance reasons, Patient wasn't discharged on BIPAP. Patient was found to have sepsis in ED as well and Antibiotics was given. CXR showed pulmonary edema and patient looks overloaded. During time of examination, Patient is AAox2. His speech was unclear. on 4L Oxygen NC. Patient denied chest pain, abdominal pain, leg pain, N/V or change in bowel movement. (14 Feb 2022 21:20)   Patient seen and examined.     PAST MEDICAL & SURGICAL HISTORY:  Hypothyroid    Hyperlipidemia    Ambulatory dysfunction    Anemia    History of BPH    CKD (chronic kidney disease)    Chronic obstructive pulmonary disease (COPD)    Bipolar disorder        Allergies    No Known Allergies    Intolerances        MEDICATIONS  (STANDING):  aspirin  chewable 81 milliGRAM(s) Oral daily  azithromycin  IVPB 500 milliGRAM(s) IV Intermittent every 24 hours  budesonide 160 MICROgram(s)/formoterol 4.5 MICROgram(s) Inhaler 2 Puff(s) Inhalation two times a day  buPROPion XL (24-Hour) . 150 milliGRAM(s) Oral daily  busPIRone 10 milliGRAM(s) Oral two times a day  calcitriol   Capsule 0.25 MICROGram(s) Oral daily  cefTRIAXone   IVPB 1000 milliGRAM(s) IV Intermittent every 24 hours  cholecalciferol 2000 Unit(s) Oral daily  diVALproex  milliGRAM(s) Oral <User Schedule>  ferrous    sulfate 325 milliGRAM(s) Oral daily  finasteride 5 milliGRAM(s) Oral daily  folic acid 1 milliGRAM(s) Oral daily  furosemide   Injectable 60 milliGRAM(s) IV Push two times a day  levothyroxine 125 MICROGram(s) Oral daily  methylPREDNISolone sodium succinate Injectable 40 milliGRAM(s) IV Push two times a day  nicotine -  14 mG/24Hr(s) Patch 1 patch Transdermal daily  pantoprazole    Tablet 40 milliGRAM(s) Oral before breakfast  polyethylene glycol 3350 17 Gram(s) Oral daily  risperiDONE   Tablet 2 milliGRAM(s) Oral daily  sevelamer carbonate 800 milliGRAM(s) Oral three times a day with meals  simvastatin 20 milliGRAM(s) Oral at bedtime  tamsulosin 0.4 milliGRAM(s) Oral at bedtime      MEDICATIONS  (PRN):  acetaminophen     Tablet .. 650 milliGRAM(s) Oral every 6 hours PRN Temp greater or equal to 38C (100.4F), Mild Pain (1 - 3)  ALBUTerol    90 MICROgram(s) HFA Inhaler 2 Puff(s) Inhalation every 6 hours PRN Shortness of Breath and/or Wheezing  magnesium hydroxide Suspension 30 milliLiter(s) Oral at bedtime PRN Constipation   Medications up to date at time of exam.    Medications up to date at time of exam.    FAMILY HISTORY:      SOCIAL HISTORY  Smoking History: [   ] smoking/smoke exposure, [  x ] former smoker smoked 11/2 PPD  Living Condition: [   ] apartment, [   ] private house  Work History:  retired   Travel History: denies recent travel  Illicit Substance Use: denies  Alcohol Use: denies    REVIEW OF SYSTEMS:    CONSTITUTIONAL:  denies fevers, chills, sweats, weight loss    HEENT:  denies diplopia or blurred vision, sore throat or runny nose.    CARDIOVASCULAR:  denies pressure, squeezing, tightness, or heaviness about the chest; no palpitations.    RESPIRATORY:  denies SOB, cough, PAULA, wheezing.    GASTROINTESTINAL:  denies abdominal pain, nausea, vomiting or diarrhea.    GENITOURINARY: denies dysuria, frequency or urgency.    NEUROLOGIC:  denies numbness, tingling, seizures or weakness.    PSYCHIATRIC:  denies disorder of thought or mood.    MSK: + chronic venous stasis       PHYSICAL EXAMINATION:    GENERAL: The patient is a well-developed, well-nourished, in no apparent distress.     Vital Signs Last 24 Hrs  T(C): 36.4 (16 Feb 2022 14:31), Max: 36.8 (16 Feb 2022 04:47)  T(F): 97.6 (16 Feb 2022 14:31), Max: 98.2 (16 Feb 2022 04:47)  HR: 82 (16 Feb 2022 14:31) (82 - 91)  BP: 133/65 (16 Feb 2022 14:31) (116/65 - 139/60)  BP(mean): --  RR: 18 (16 Feb 2022 14:31) (18 - 19)  SpO2: 94% (16 Feb 2022 14:31) (94% - 97%)   (if applicable)    Chest Tube (if applicable)    HEENT: Head is normocephalic and atraumatic. Extraocular muscles are intact. Mucous membranes are moist.     NECK: Supple, no palpable adenopathy.    LUNGS: Clear to auscultation, no wheezing, rales, or rhonchi.    HEART: Regular rate and rhythm without murmur.    ABDOMEN: Soft, nontender, and nondistended.  No hepatosplenomegaly is noted.    RENAL: No difficulty voiding, no pelvic pain    EXTREMITIES: + b/l  LE chronic skin changes with edema     NEUROLOGIC: Awake, alert, oriented, grossly intact    SKIN: Warm, dry, good turgor.      LABS:                        8.8    9.14  )-----------( 249      ( 16 Feb 2022 06:37 )             27.5     02-16    142  |  106  |  55<H>  ----------------------------<  97  4.2   |  34<H>  |  3.67<H>    Ca    9.6      16 Feb 2022 06:37  Phos  5.5     02-15  Mg     2.9     02-15    TPro  7.3  /  Alb  2.3<L>  /  TBili  0.2  /  DBili  x   /  AST  6<L>  /  ALT  10  /  AlkPhos  80  02-15        ABG - ( 16 Feb 2022 04:28 )  pH, Arterial: 7.31  pH, Blood: x     /  pCO2: 70    /  pO2: 74    / HCO3: 35    / Base Excess: 6.4   /  SaO2: 96            Serum Pro-Brain Natriuretic Peptide: 1228 pg/mL (02-14-22 @ 18:27)          MICROBIOLOGY: (if applicable)    RADIOLOGY & ADDITIONAL STUDIES:  EKG:   CXR:< from: CT Chest No Cont (02.16.22 @ 09:17) >    ACC: [id number] EXAM:  CT CHEST                          PROCEDURE DATE:  02/16/2022          INTERPRETATION:  Clinical information: Evaluate for infiltrate.    CT scan of the chest was obtained without administration of intravenous   contrast.    Calcifications are noted within both lobes of the thyroid gland.    No hilar and or mediastinal adenopathy is noted.    Heart is enlarged in size. Calcification of the aortic valve and the   coronary arteries is noted. No pericardial effusion is noted.    No endobronchial lesions are noted. Mucous/secretions are noted in the   trachea. Few small opacities are noted in the left lower lobe. Linear   opacities representing atelectasis are noted within both lungs. Trace   left pleural effusion is noted.    Below the diaphragm, visualized portions of the abdomen are unremarkable.    Degenerative changes of the spine are noted.    IMPRESSION: Few small opacities are noted in the left lower lobe.   Etiology is unclear. Follow-up CT scan is recommended in 3 months to   ensure resolution.    --- End of Report ---            ANKIT LEGGETT MD; Attending Radiologist  This document has been electronically signed. Feb 16 2022 10:28AM    < end of copied text >    ECHO:    IMPRESSION: 62y Male PAST MEDICAL & SURGICAL HISTORY:  Hypothyroid    Hyperlipidemia    Ambulatory dysfunction    Anemia    History of BPH    CKD (chronic kidney disease)    Chronic obstructive pulmonary disease (COPD)    Bipolar disorder     p/w         IMP: This is a 62 yr old morbid obese man , former smoker  from [facility]  ambulates with walker, Chronic hypercapnic hypoxic resp failure due to COPD requiring supp O2 @ 2 L/Min ,hypothyroidism, anemia, left DVT, CKD, BPH, HLD, bipolar disorder, ambulatory dysfunction, lymphedema was brought to ED due to shortness of breath from a  combination of Pul edema with COPD , morbid obesity , TEJ and possible heart failure.  He had use BiPaP in past . While in rehab did not use BiPaP.  ? PNSA on antibx by primary team     Sugg:  - Continue O2 supp as needed   - BiPaP at 16/8  plus O2   - Diuresis   - Fluid restriction   - Continue Albuterol inhaler   - Symbicort   - Change solumedrol to prednisone 40 mg /day fro 2/17  - Advise to stop smoking   - Repeat CXR in AM   - DVT GI Prophy

## 2022-02-16 NOTE — PROGRESS NOTE ADULT - SUBJECTIVE AND OBJECTIVE BOX
NP Note     Patient is a 62y old  Male who presents with a chief complaint of Shortness of breath / CHF (15 Feb 2022 21:08)      INTERVAL HPI/OVERNIGHT EVENTS: no new complaints    MEDICATIONS  (STANDING):  aspirin  chewable 81 milliGRAM(s) Oral daily  azithromycin  IVPB 500 milliGRAM(s) IV Intermittent every 24 hours  budesonide 160 MICROgram(s)/formoterol 4.5 MICROgram(s) Inhaler 2 Puff(s) Inhalation two times a day  buPROPion XL (24-Hour) . 150 milliGRAM(s) Oral daily  busPIRone 10 milliGRAM(s) Oral two times a day  calcitriol   Capsule 0.25 MICROGram(s) Oral daily  cefTRIAXone   IVPB 1000 milliGRAM(s) IV Intermittent every 24 hours  cholecalciferol 2000 Unit(s) Oral daily  diVALproex  milliGRAM(s) Oral <User Schedule>  ferrous    sulfate 325 milliGRAM(s) Oral daily  finasteride 5 milliGRAM(s) Oral daily  folic acid 1 milliGRAM(s) Oral daily  furosemide   Injectable 60 milliGRAM(s) IV Push two times a day  levothyroxine 125 MICROGram(s) Oral daily  nicotine -  14 mG/24Hr(s) Patch 1 patch Transdermal daily  pantoprazole    Tablet 40 milliGRAM(s) Oral before breakfast  polyethylene glycol 3350 17 Gram(s) Oral daily  risperiDONE   Tablet 2 milliGRAM(s) Oral daily  sevelamer carbonate 800 milliGRAM(s) Oral three times a day with meals  simvastatin 20 milliGRAM(s) Oral at bedtime  tamsulosin 0.4 milliGRAM(s) Oral at bedtime    MEDICATIONS  (PRN):  acetaminophen     Tablet .. 650 milliGRAM(s) Oral every 6 hours PRN Temp greater or equal to 38C (100.4F), Mild Pain (1 - 3)  ALBUTerol    90 MICROgram(s) HFA Inhaler 2 Puff(s) Inhalation every 6 hours PRN Shortness of Breath and/or Wheezing  magnesium hydroxide Suspension 30 milliLiter(s) Oral at bedtime PRN Constipation      __________________________________________________  REVIEW OF SYSTEMS:    CONSTITUTIONAL: No fever,   RESPIRATORY: No cough; +intermittent  shortness of breath  CARDIOVASCULAR: No chest pain, no palpitations  GASTROINTESTINAL: No pain. No nausea or vomiting; No diarrhea   NEUROLOGICAL: No headache or numbness, no tremors  MUSCULOSKELETAL: + chronic leg pain       Vital Signs Last 24 Hrs  T(C): 36.8 (2022 04:47), Max: 36.8 (2022 04:47)  T(F): 98.2 (2022 04:47), Max: 98.2 (2022 04:47)  HR: 91 (2022 04:47) (84 - 95)  BP: 116/65 (2022 04:47) (116/65 - 164/67)  BP(mean): 80 (15 Feb 2022 15:45) (80 - 80)  RR: 19 (2022 04:47) (17 - 19)  SpO2: 94% (2022 04:47) (94% - 97%)    ________________________________________________  PHYSICAL EXAM:  GENERAL: NAD, morbidly obese   CHEST/LUNG: decreased breath sounds with scattered rales   HEART: S1 S2  regular;   ABDOMEN: Soft, Nontender, nondistended; Bowel sounds present  EXTREMITIES: + LE chronic edema, discoloration hx of lymphedema   SKIN: warm and dry; no rash  NERVOUS SYSTEM:  Awake and alert; Oriented  to place, person and time ; no new deficits    _________________________________________________  LABS:                        8.8    9.14  )-----------( 249      ( 2022 06:37 )             27.5     02-16    142  |  106  |  55<H>  ----------------------------<  97  4.2   |  34<H>  |  3.67<H>    Ca    9.6      2022 06:37  Phos  5.5     02-15  Mg     2.9     02-15    TPro  7.3  /  Alb  2.3<L>  /  TBili  0.2  /  DBili  x   /  AST  6<L>  /  ALT  10  /  AlkPhos  80  02-15    PT/INR - ( 2022 18:27 )   PT: 12.0 sec;   INR: 1.01 ratio         PTT - ( 2022 18:27 )  PTT:33.2 sec  Urinalysis Basic - ( 2022 18:27 )    Color: Yellow / Appearance: Clear / S.010 / pH: x  Gluc: x / Ketone: Negative  / Bili: Negative / Urobili: Negative   Blood: x / Protein: 30 mg/dL / Nitrite: Negative   Leuk Esterase: Negative / RBC: 0-2 /HPF / WBC 0-2 /HPF   Sq Epi: x / Non Sq Epi: x / Bacteria: Trace /HPF      CAPILLARY BLOOD GLUCOSE            RADIOLOGY & ADDITIONAL TESTS:    Imaging Personally Reviewed:  YES/NO    Consultant(s) Notes Reviewed:   YES/ No    Care Discussed with Consultants :     Plan of care was discussed with patient and /or primary care giver; all questions and concerns were addressed and care was aligned with patient's wishes.

## 2022-02-17 DIAGNOSIS — Z02.9 ENCOUNTER FOR ADMINISTRATIVE EXAMINATIONS, UNSPECIFIED: ICD-10-CM

## 2022-02-17 DIAGNOSIS — J18.9 PNEUMONIA, UNSPECIFIED ORGANISM: ICD-10-CM

## 2022-02-17 LAB
ANION GAP SERPL CALC-SCNC: 4 MMOL/L — LOW (ref 5–17)
BUN SERPL-MCNC: 54 MG/DL — HIGH (ref 7–18)
CALCIUM SERPL-MCNC: 10 MG/DL — SIGNIFICANT CHANGE UP (ref 8.4–10.5)
CALCIUM SERPL-MCNC: 9.6 MG/DL — SIGNIFICANT CHANGE UP (ref 8.4–10.5)
CHLORIDE SERPL-SCNC: 104 MMOL/L — SIGNIFICANT CHANGE UP (ref 96–108)
CO2 SERPL-SCNC: 36 MMOL/L — HIGH (ref 22–31)
CREAT SERPL-MCNC: 3.7 MG/DL — HIGH (ref 0.5–1.3)
GLUCOSE SERPL-MCNC: 113 MG/DL — HIGH (ref 70–99)
HCT VFR BLD CALC: 30.5 % — LOW (ref 39–50)
HGB BLD-MCNC: 9.8 G/DL — LOW (ref 13–17)
MCHC RBC-ENTMCNC: 32 PG — SIGNIFICANT CHANGE UP (ref 27–34)
MCHC RBC-ENTMCNC: 32.1 GM/DL — SIGNIFICANT CHANGE UP (ref 32–36)
MCV RBC AUTO: 99.7 FL — SIGNIFICANT CHANGE UP (ref 80–100)
NRBC # BLD: 0 /100 WBCS — SIGNIFICANT CHANGE UP (ref 0–0)
PLATELET # BLD AUTO: 294 K/UL — SIGNIFICANT CHANGE UP (ref 150–400)
POTASSIUM SERPL-MCNC: 4.7 MMOL/L — SIGNIFICANT CHANGE UP (ref 3.5–5.3)
POTASSIUM SERPL-SCNC: 4.7 MMOL/L — SIGNIFICANT CHANGE UP (ref 3.5–5.3)
PTH-INTACT FLD-MCNC: 214 PG/ML — HIGH (ref 15–65)
RBC # BLD: 3.06 M/UL — LOW (ref 4.2–5.8)
RBC # FLD: 14.1 % — SIGNIFICANT CHANGE UP (ref 10.3–14.5)
SODIUM SERPL-SCNC: 144 MMOL/L — SIGNIFICANT CHANGE UP (ref 135–145)
WBC # BLD: 7.23 K/UL — SIGNIFICANT CHANGE UP (ref 3.8–10.5)
WBC # FLD AUTO: 7.23 K/UL — SIGNIFICANT CHANGE UP (ref 3.8–10.5)

## 2022-02-17 RX ORDER — HEPARIN SODIUM 5000 [USP'U]/ML
7500 INJECTION INTRAVENOUS; SUBCUTANEOUS EVERY 12 HOURS
Refills: 0 | Status: DISCONTINUED | OUTPATIENT
Start: 2022-02-17 | End: 2022-02-18

## 2022-02-17 RX ORDER — FUROSEMIDE 40 MG
60 TABLET ORAL DAILY
Refills: 0 | Status: DISCONTINUED | OUTPATIENT
Start: 2022-02-17 | End: 2022-02-18

## 2022-02-17 RX ADMIN — Medication 81 MILLIGRAM(S): at 11:26

## 2022-02-17 RX ADMIN — Medication 40 MILLIGRAM(S): at 06:32

## 2022-02-17 RX ADMIN — BUDESONIDE AND FORMOTEROL FUMARATE DIHYDRATE 2 PUFF(S): 160; 4.5 AEROSOL RESPIRATORY (INHALATION) at 21:01

## 2022-02-17 RX ADMIN — BUDESONIDE AND FORMOTEROL FUMARATE DIHYDRATE 2 PUFF(S): 160; 4.5 AEROSOL RESPIRATORY (INHALATION) at 11:26

## 2022-02-17 RX ADMIN — AZITHROMYCIN 255 MILLIGRAM(S): 500 TABLET, FILM COATED ORAL at 20:43

## 2022-02-17 RX ADMIN — Medication 1 MILLIGRAM(S): at 11:26

## 2022-02-17 RX ADMIN — CALCITRIOL 0.25 MICROGRAM(S): 0.5 CAPSULE ORAL at 11:26

## 2022-02-17 RX ADMIN — SEVELAMER CARBONATE 800 MILLIGRAM(S): 2400 POWDER, FOR SUSPENSION ORAL at 12:50

## 2022-02-17 RX ADMIN — DIVALPROEX SODIUM 500 MILLIGRAM(S): 500 TABLET, DELAYED RELEASE ORAL at 17:21

## 2022-02-17 RX ADMIN — SEVELAMER CARBONATE 800 MILLIGRAM(S): 2400 POWDER, FOR SUSPENSION ORAL at 17:22

## 2022-02-17 RX ADMIN — Medication 125 MICROGRAM(S): at 07:00

## 2022-02-17 RX ADMIN — Medication 10 MILLIGRAM(S): at 06:31

## 2022-02-17 RX ADMIN — PANTOPRAZOLE SODIUM 40 MILLIGRAM(S): 20 TABLET, DELAYED RELEASE ORAL at 06:31

## 2022-02-17 RX ADMIN — HEPARIN SODIUM 7500 UNIT(S): 5000 INJECTION INTRAVENOUS; SUBCUTANEOUS at 17:23

## 2022-02-17 RX ADMIN — Medication 10 MILLIGRAM(S): at 17:22

## 2022-02-17 RX ADMIN — DIVALPROEX SODIUM 500 MILLIGRAM(S): 500 TABLET, DELAYED RELEASE ORAL at 08:28

## 2022-02-17 RX ADMIN — POLYETHYLENE GLYCOL 3350 17 GRAM(S): 17 POWDER, FOR SOLUTION ORAL at 11:27

## 2022-02-17 RX ADMIN — Medication 60 MILLIGRAM(S): at 06:35

## 2022-02-17 RX ADMIN — SIMVASTATIN 20 MILLIGRAM(S): 20 TABLET, FILM COATED ORAL at 21:01

## 2022-02-17 RX ADMIN — BUPROPION HYDROCHLORIDE 150 MILLIGRAM(S): 150 TABLET, EXTENDED RELEASE ORAL at 11:26

## 2022-02-17 RX ADMIN — Medication 2000 UNIT(S): at 12:55

## 2022-02-17 RX ADMIN — SEVELAMER CARBONATE 800 MILLIGRAM(S): 2400 POWDER, FOR SUSPENSION ORAL at 08:28

## 2022-02-17 RX ADMIN — FINASTERIDE 5 MILLIGRAM(S): 5 TABLET, FILM COATED ORAL at 11:29

## 2022-02-17 RX ADMIN — TAMSULOSIN HYDROCHLORIDE 0.4 MILLIGRAM(S): 0.4 CAPSULE ORAL at 21:01

## 2022-02-17 RX ADMIN — RISPERIDONE 2 MILLIGRAM(S): 4 TABLET ORAL at 11:26

## 2022-02-17 RX ADMIN — Medication 325 MILLIGRAM(S): at 11:26

## 2022-02-17 NOTE — PROGRESS NOTE ADULT - SUBJECTIVE AND OBJECTIVE BOX
C A R D I O L O G Y  **********************************     DATE OF SERVICE: 02-17-22    Patient denies chest pain or shortness of breath.   Review of symptoms otherwise negative.    acetaminophen     Tablet .. 650 milliGRAM(s) Oral every 6 hours PRN  ALBUTerol    90 MICROgram(s) HFA Inhaler 2 Puff(s) Inhalation every 6 hours PRN  aspirin  chewable 81 milliGRAM(s) Oral daily  azithromycin  IVPB 500 milliGRAM(s) IV Intermittent every 24 hours  budesonide 160 MICROgram(s)/formoterol 4.5 MICROgram(s) Inhaler 2 Puff(s) Inhalation two times a day  buPROPion XL (24-Hour) . 150 milliGRAM(s) Oral daily  busPIRone 10 milliGRAM(s) Oral two times a day  calcitriol   Capsule 0.25 MICROGram(s) Oral daily  cefTRIAXone   IVPB 1000 milliGRAM(s) IV Intermittent every 24 hours  cholecalciferol 2000 Unit(s) Oral daily  diVALproex  milliGRAM(s) Oral <User Schedule>  ferrous    sulfate 325 milliGRAM(s) Oral daily  finasteride 5 milliGRAM(s) Oral daily  folic acid 1 milliGRAM(s) Oral daily  furosemide   Injectable 60 milliGRAM(s) IV Push daily  levothyroxine 125 MICROGram(s) Oral daily  magnesium hydroxide Suspension 30 milliLiter(s) Oral at bedtime PRN  nicotine -  14 mG/24Hr(s) Patch 1 patch Transdermal daily  pantoprazole    Tablet 40 milliGRAM(s) Oral before breakfast  polyethylene glycol 3350 17 Gram(s) Oral daily  predniSONE   Tablet 40 milliGRAM(s) Oral daily  risperiDONE   Tablet 2 milliGRAM(s) Oral daily  sevelamer carbonate 800 milliGRAM(s) Oral three times a day with meals  simvastatin 20 milliGRAM(s) Oral at bedtime  tamsulosin 0.4 milliGRAM(s) Oral at bedtime                            9.8    7.23  )-----------( 294      ( 17 Feb 2022 07:26 )             30.5       Hemoglobin: 9.8 g/dL (02-17 @ 07:26)  Hemoglobin: 8.8 g/dL (02-16 @ 06:37)  Hemoglobin: 8.8 g/dL (02-15 @ 05:43)  Hemoglobin: 9.6 g/dL (02-14 @ 18:27)      02-17    144  |  104  |  54<H>  ----------------------------<  113<H>  4.7   |  36<H>  |  3.70<H>    Ca    10.0      17 Feb 2022 07:26      Creatinine Trend: 3.70<--, 3.67<--, 3.95<--, 3.82<--    COAGS:           T(C): 36.8 (02-17-22 @ 05:55), Max: 37 (02-16-22 @ 21:21)  HR: 82 (02-17-22 @ 05:55) (77 - 84)  BP: 117/60 (02-17-22 @ 05:55) (117/60 - 150/73)  RR: 18 (02-17-22 @ 05:55) (18 - 20)  SpO2: 99% (02-17-22 @ 05:55) (93% - 99%)  Wt(kg): --    I&O's Summary    16 Feb 2022 07:01  -  17 Feb 2022 07:00  --------------------------------------------------------  IN: 0 mL / OUT: 400 mL / NET: -400 mL        HEENT:  (-)icterus (-)pallor  CV: N S1 S2 1/6 ABIGAIL (+)2 Pulses B/l  Resp:  Clear to ausculatation B/L, normal effort  GI: (+) BS Soft, NT, ND  Lymph:  (+)Edema/ Stasis , (-)obvious lymphadenopathy  Skin: Warm to touch, Normal turgor  Psych: Appropriate mood and affect          ASSESSMENT/PLAN: 	62y Male Morbid obese male from [facility] with past medical history of ambulates with walker has past medical hx of COPD not on O2 at home, normal LV/ RV function  hypothyroidism, anemia, left DVT, CKD, BPH, HLD, bipolar disorder, ambulatory dysfunction, lymphedema was brought to ED due to shortness of breath pulmonary vascular congestion.    - Suspect pulmonary vascular congestion is at least in part driven  by Advanced renal disease  - keep net negative diuretics per renal  -  renal and pulm eval appreciated   - echo with no interval changes    Austen Javier MD, [facility]  BEEPER [phone]

## 2022-02-17 NOTE — PROGRESS NOTE ADULT - PROBLEM SELECTOR PLAN 6
Continue on home medications; Synthroid
baseline 3.1  - Creatinine 3.95--> 3.67--> 3.7   - Avoid  nephrotoxics  - Renal, Dr Valenzuela,  following
- Continue on home medications

## 2022-02-17 NOTE — PHYSICAL THERAPY INITIAL EVALUATION ADULT - GENERAL OBSERVATIONS, REHAB EVAL
awake, alert, NAD; currently on O2@2L/min via NC; + peripheral IV access on both forearms; +skin creases and dry skin on all extremities, also complains of thirst -on fluid restriction, currently on furosemide; dark discoloration on both lower extremities

## 2022-02-17 NOTE — PROGRESS NOTE ADULT - ASSESSMENT
61 y/o Morbid obese male from [facility] ambulates with walker has past medical hx of COPD not on O2 at home, hypothyroidism, anemia, left DVT, CKD, BPH, HLD, bipolar disorder, ambulatory dysfunction, lymphedema presented with shortness of breath Patient stated he uses oxygen  2L at night. Patient was admitted to [facility] before in August and was recommended  BIPAP however never used. In ED Patient's oxygen saturation in 80s, WBC 14K, Hypotensive, tachycardic, febrile. Patient received IV Zosyn and Vancomycin   CXR showed pulmonary edema with questionable left base infiltrate. CT chest reveals few small opacities LLL with recommendation for f/u CT in 3 months to check for resolution.  Patient got 60mg IV lasix. Patient was admitted for acute hypoxia respiratory failure due to COPD/CHF exacerbation and PNA ID and cardiology consulted and following. Started on Zithromax and ceftriaxone Lasix 60 mg IV BID continued. Pulmonary also following; on Bipap 16/8. TTE with normal left ventricular systolic function,  (EF = 55 to 60%). Normal diastolic function.

## 2022-02-17 NOTE — PROGRESS NOTE ADULT - ASSESSMENT
1. SHEYLA due to possible CRS  -scr is unchanged with stable electrolytes and close to baseline. Will accept higher bun/cr to optimize volume and respiratory status. continue lasix 60mg iv bid.    -Adjust meds to eGFR and avoid IV Gadolinium contrast,NSAIDs, and phosphate enema.  -Monitor I/O's daily.   -Monitor SMA daily.  2. CKD stage 4 most likely due to ischemic nephropathy  -baseline scr around 3.1mg/dL  -Keep patient euvolemic and renal diet  -Avoid Nephrotoxic Meds/ Agents such as (NSAIDs, IV contrast, Aminoglycosides such as gentamicin, -Gadolinium contrast, Phosphate containing enemas, etc..)  -Adjust Medications according to eGFR  3. Anemia:  -on iron tabs.   -F/u CBC daily  -transfuse if HB < 7.0.  3. Mineral Bone Disease:  -phos is elevated and on renvela 1 tab tid and on calcitriol 0.25mcg daily;  improving  (334)   4. Respiratory Acidosis: on bipap prn and echo noted. on lasix. cardiology consulted.     Discussed the assessment and plan with Primary Team/Nurse

## 2022-02-17 NOTE — PROGRESS NOTE ADULT - PROBLEM SELECTOR PLAN 9
PT eval pending  For possible discharge tomorrow if remains stable. PT eval pending  f/u Cxray  For possible discharge tomorrow if remains stable.

## 2022-02-17 NOTE — PROGRESS NOTE ADULT - SUBJECTIVE AND OBJECTIVE BOX
Time of Visit:  Patient seen and examined.     MEDICATIONS  (STANDING):  aspirin  chewable 81 milliGRAM(s) Oral daily  azithromycin  IVPB 500 milliGRAM(s) IV Intermittent every 24 hours  budesonide 160 MICROgram(s)/formoterol 4.5 MICROgram(s) Inhaler 2 Puff(s) Inhalation two times a day  buPROPion XL (24-Hour) . 150 milliGRAM(s) Oral daily  busPIRone 10 milliGRAM(s) Oral two times a day  calcitriol   Capsule 0.25 MICROGram(s) Oral daily  cefTRIAXone   IVPB 1000 milliGRAM(s) IV Intermittent every 24 hours  cholecalciferol 2000 Unit(s) Oral daily  diVALproex  milliGRAM(s) Oral <User Schedule>  ferrous    sulfate 325 milliGRAM(s) Oral daily  finasteride 5 milliGRAM(s) Oral daily  folic acid 1 milliGRAM(s) Oral daily  furosemide   Injectable 60 milliGRAM(s) IV Push daily  levothyroxine 125 MICROGram(s) Oral daily  nicotine -  14 mG/24Hr(s) Patch 1 patch Transdermal daily  pantoprazole    Tablet 40 milliGRAM(s) Oral before breakfast  polyethylene glycol 3350 17 Gram(s) Oral daily  predniSONE   Tablet 40 milliGRAM(s) Oral daily  risperiDONE   Tablet 2 milliGRAM(s) Oral daily  sevelamer carbonate 800 milliGRAM(s) Oral three times a day with meals  simvastatin 20 milliGRAM(s) Oral at bedtime  tamsulosin 0.4 milliGRAM(s) Oral at bedtime      MEDICATIONS  (PRN):  acetaminophen     Tablet .. 650 milliGRAM(s) Oral every 6 hours PRN Temp greater or equal to 38C (100.4F), Mild Pain (1 - 3)  ALBUTerol    90 MICROgram(s) HFA Inhaler 2 Puff(s) Inhalation every 6 hours PRN Shortness of Breath and/or Wheezing  magnesium hydroxide Suspension 30 milliLiter(s) Oral at bedtime PRN Constipation       Medications up to date at time of exam.    ROS; No fever, chills, cough, nasal congestion.   PHYSICAL EXAMINATION:    Vital Signs Last 24 Hrs  T(C): 37.1 (17 Feb 2022 13:10), Max: 37.1 (17 Feb 2022 13:10)  T(F): 98.7 (17 Feb 2022 13:10), Max: 98.7 (17 Feb 2022 13:10)  HR: 81 (17 Feb 2022 15:02) (77 - 84)  BP: 109/61 (17 Feb 2022 13:10) (109/61 - 150/73)  BP(mean): --  RR: 18 (17 Feb 2022 15:02) (18 - 20)  SpO2: 94% (17 Feb 2022 15:02) (93% - 99%)   (if applicable)    General : Alert and oriented. Fair historian.  Morbid Obese. No acute distress.     HEENT: Head is normocephalic and atraumatic. No nasal tenderness. Extraocular muscles are intact. Mucous membranes are moist.     NECK: Supple, no palpable adenopathy.    LUNGS: Non labored. Fair air entrance. No wheezing. No use of accessory muscle .    HEART: S1 S2 Regular rate and no click/ rub.     ABDOMEN: Soft, nontender, and nondistended. Active bowel sounds.     ; No bladder distention and tenderness.     NEUROLOGIC: Awake, alert, oriented.     SKIN: Warm and moist. Non diaphoretic. Has bilateral LE with chronic skin changes.       LABS:                        9.8    7.23  )-----------( 294      ( 17 Feb 2022 07:26 )             30.5     02-17    144  |  104  |  54<H>  ----------------------------<  113<H>  4.7   |  36<H>  |  3.70<H>    Ca    10.0      17 Feb 2022 07:26          ABG - ( 16 Feb 2022 04:28 )  pH, Arterial: 7.31  pH, Blood: x     /  pCO2: 70    /  pO2: 74    / HCO3: 35    / Base Excess: 6.4   /  SaO2: 96                      Serum Pro-Brain Natriuretic Peptide: 1228 pg/mL (02-14-22 @ 18:27)          MICROBIOLOGY: (if applicable)    RADIOLOGY & ADDITIONAL STUDIES:  EKG:   CXR:  ECHO:    IMPRESSION: 62y Male PAST MEDICAL & SURGICAL HISTORY:  Hypothyroid    Hyperlipidemia    Ambulatory dysfunction    Anemia    History of BPH    CKD (chronic kidney disease)    Chronic obstructive pulmonary disease (COPD)    Bipolar disorder    Impression; 61 Y/O Male who appears older than stated age from [facility] presented with shortness of breath from combination Pulmonary Edema with COPD, Morbid Obesity. Has chronic hypoxic respiratory failure secondary to COPD and CHF exacerbation with LLL Pneumonia. Has TEJ but non compliant with Bipap at the Assisted Living Facility . Negative Blood Cx x2 , RVP and Negative Covid 19.         Suggestion:   O2 saturation 94% with O2 supplement. Continue Oxygen supplementation 2L NC.  Reinforced the importance of daily compliance and continue Bipap 16/8 FIO2 40% at Night.   Reinforced the importance of smoking cessation . On Nicotine patch on x 12 Hours.  Continue PRN Albuterol 2Puffs Q 6 Hours.  Continue Symbicort 2 Puffs twice Daily.      Started on Prednisone 40 mg oral daily x 5 days .  On Azithromycin 500 mg IVPB daily . Ceftriaxone 1 Gm IVPB daily x 7 days as per ID recommendation.       Time of Visit:  Patient seen and examined.     MEDICATIONS  (STANDING):  aspirin  chewable 81 milliGRAM(s) Oral daily  azithromycin  IVPB 500 milliGRAM(s) IV Intermittent every 24 hours  budesonide 160 MICROgram(s)/formoterol 4.5 MICROgram(s) Inhaler 2 Puff(s) Inhalation two times a day  buPROPion XL (24-Hour) . 150 milliGRAM(s) Oral daily  busPIRone 10 milliGRAM(s) Oral two times a day  calcitriol   Capsule 0.25 MICROGram(s) Oral daily  cefTRIAXone   IVPB 1000 milliGRAM(s) IV Intermittent every 24 hours  cholecalciferol 2000 Unit(s) Oral daily  diVALproex  milliGRAM(s) Oral <User Schedule>  ferrous    sulfate 325 milliGRAM(s) Oral daily  finasteride 5 milliGRAM(s) Oral daily  folic acid 1 milliGRAM(s) Oral daily  furosemide   Injectable 60 milliGRAM(s) IV Push daily  levothyroxine 125 MICROGram(s) Oral daily  nicotine -  14 mG/24Hr(s) Patch 1 patch Transdermal daily  pantoprazole    Tablet 40 milliGRAM(s) Oral before breakfast  polyethylene glycol 3350 17 Gram(s) Oral daily  predniSONE   Tablet 40 milliGRAM(s) Oral daily  risperiDONE   Tablet 2 milliGRAM(s) Oral daily  sevelamer carbonate 800 milliGRAM(s) Oral three times a day with meals  simvastatin 20 milliGRAM(s) Oral at bedtime  tamsulosin 0.4 milliGRAM(s) Oral at bedtime      MEDICATIONS  (PRN):  acetaminophen     Tablet .. 650 milliGRAM(s) Oral every 6 hours PRN Temp greater or equal to 38C (100.4F), Mild Pain (1 - 3)  ALBUTerol    90 MICROgram(s) HFA Inhaler 2 Puff(s) Inhalation every 6 hours PRN Shortness of Breath and/or Wheezing  magnesium hydroxide Suspension 30 milliLiter(s) Oral at bedtime PRN Constipation       Medications up to date at time of exam.    ROS; No fever, chills, cough, nasal congestion.   PHYSICAL EXAMINATION:    Vital Signs Last 24 Hrs  T(C): 37.1 (17 Feb 2022 13:10), Max: 37.1 (17 Feb 2022 13:10)  T(F): 98.7 (17 Feb 2022 13:10), Max: 98.7 (17 Feb 2022 13:10)  HR: 81 (17 Feb 2022 15:02) (77 - 84)  BP: 109/61 (17 Feb 2022 13:10) (109/61 - 150/73)  BP(mean): --  RR: 18 (17 Feb 2022 15:02) (18 - 20)  SpO2: 94% (17 Feb 2022 15:02) (93% - 99%)   (if applicable)    General : Alert and oriented. Fair historian.  Morbid Obese. No acute distress.     HEENT: Head is normocephalic and atraumatic. No nasal tenderness. Extraocular muscles are intact. Mucous membranes are moist.     NECK: Supple, no palpable adenopathy.    LUNGS: Non labored. Fair air entrance. No wheezing. No use of accessory muscle .    HEART: S1 S2 Regular rate and no click/ rub.     ABDOMEN: Soft, nontender, and nondistended. Active bowel sounds.     ; No bladder distention and tenderness.     NEUROLOGIC: Awake, alert, oriented.     SKIN: Warm and moist. Non diaphoretic. Has bilateral LE with chronic skin changes.       LABS:                        9.8    7.23  )-----------( 294      ( 17 Feb 2022 07:26 )             30.5     02-17    144  |  104  |  54<H>  ----------------------------<  113<H>  4.7   |  36<H>  |  3.70<H>    Ca    10.0      17 Feb 2022 07:26          ABG - ( 16 Feb 2022 04:28 )  pH, Arterial: 7.31  pH, Blood: x     /  pCO2: 70    /  pO2: 74    / HCO3: 35    / Base Excess: 6.4   /  SaO2: 96                      Serum Pro-Brain Natriuretic Peptide: 1228 pg/mL (02-14-22 @ 18:27)          MICROBIOLOGY: (if applicable)    RADIOLOGY & ADDITIONAL STUDIES:  EKG:   CXR:  ECHO:    IMPRESSION: 62y Male PAST MEDICAL & SURGICAL HISTORY:  Hypothyroid    Hyperlipidemia    Ambulatory dysfunction    Anemia    History of BPH    CKD (chronic kidney disease)    Chronic obstructive pulmonary disease (COPD)    Bipolar disorder    Impression; 63 Y/O Male who appears older than stated age from [facility] presented with shortness of breath from combination Pulmonary Edema with COPD, Morbid Obesity. Has chronic hypoxic respiratory failure secondary to COPD and CHF exacerbation with LLL Pneumonia. Has TEJ but non compliant with Bipap at the Assisted Living Facility . Negative Blood Cx x2 , RVP and Negative Covid 19.         Suggestion:   O2 saturation 94% with O2 supplement. Continue Oxygen supplementation 2L NC.  Reinforced the importance of daily compliance and continue Bipap 16/8 FIO2 40% at Night.   Reinforced the importance of smoking cessation . On Nicotine patch on x 12 Hours.  Continue PRN Albuterol 2Puffs Q 6 Hours.  Continue Symbicort 2 Puffs twice Daily.      Started on Prednisone 40 mg oral daily x 5 days .  On Azithromycin 500 mg IVPB daily . Ceftriaxone 1 Gm IVPB daily x 7 days as per ID recommendation.          Agree with above assessment and plan as transcribed.

## 2022-02-17 NOTE — PHYSICAL THERAPY INITIAL EVALUATION ADULT - PATIENT/FAMILY/SIGNIFICANT OTHER GOALS STATEMENT, PT EVAL
return to assisted living and be able to perform mobility and functional activities independently while using a rolling walker while on portable O2@2L/min via NC

## 2022-02-17 NOTE — PHYSICAL THERAPY INITIAL EVALUATION ADULT - MODALITIES TREATMENT COMMENTS
currently on O2@2L/min via NC; reports modified Demetrio RPE of 7 when performing bed mobility and sit-stand tasks; requires rest period and breathing strategies to return back to baseline

## 2022-02-17 NOTE — PROGRESS NOTE ADULT - PROBLEM SELECTOR PLAN 5
- Continue albuterol, symbicort  - Switched from Solumedrol to Prednisone today   - Bipap 16/8  -continue  on 2-4 L NC PRN when not on bipap ( eating etc)

## 2022-02-17 NOTE — PROGRESS NOTE ADULT - PROBLEM SELECTOR PLAN 1
likely due to COPD/ CHF exacerbation with morbid obesity, suspected PNA   - Continue bipap 16/8  - continue Lasix 60 mg IV daily  - Solumedrol switched to PO Prednisone 40mg daily today  - Continue current antimicrobial therapies  - Daily weights, strict Is and Os  - Echo with normal LV & RV  systolic function   - continue to monitor oxygen saturation  - Pulmonary following

## 2022-02-17 NOTE — PROGRESS NOTE ADULT - PROBLEM SELECTOR PLAN 4
Continue on home medications   BIPAP if needed; Not new BIPAP; used in on last admission  continue  on 2-4 L NC adjust as indicated
-CT chest reveals few small opacities LLL with recommendation for f/u CT in 3 months to check for resolution.  - Continue Azithromycin and Ceftriaxone  - Continue to monitor WBC, vitals
- Continue on home medications   - BIPAP if needed; Not new BIPAP; used in on last admission

## 2022-02-17 NOTE — PROGRESS NOTE ADULT - SUBJECTIVE AND OBJECTIVE BOX
NP Note discussed with  Primary Attending; Dr Phillips     Patient is a 62y old  Male who presents with a chief complaint of Shortness of breath / CHF (17 Feb 2022 14:02)      INTERVAL HPI/OVERNIGHT EVENTS: Patient seen and examined earlier this am; no new complaints    MEDICATIONS  (STANDING):  aspirin  chewable 81 milliGRAM(s) Oral daily  azithromycin  IVPB 500 milliGRAM(s) IV Intermittent every 24 hours  budesonide 160 MICROgram(s)/formoterol 4.5 MICROgram(s) Inhaler 2 Puff(s) Inhalation two times a day  buPROPion XL (24-Hour) . 150 milliGRAM(s) Oral daily  busPIRone 10 milliGRAM(s) Oral two times a day  calcitriol   Capsule 0.25 MICROGram(s) Oral daily  cefTRIAXone   IVPB 1000 milliGRAM(s) IV Intermittent every 24 hours  cholecalciferol 2000 Unit(s) Oral daily  diVALproex  milliGRAM(s) Oral <User Schedule>  ferrous    sulfate 325 milliGRAM(s) Oral daily  finasteride 5 milliGRAM(s) Oral daily  folic acid 1 milliGRAM(s) Oral daily  furosemide   Injectable 60 milliGRAM(s) IV Push daily  levothyroxine 125 MICROGram(s) Oral daily  nicotine -  14 mG/24Hr(s) Patch 1 patch Transdermal daily  pantoprazole    Tablet 40 milliGRAM(s) Oral before breakfast  polyethylene glycol 3350 17 Gram(s) Oral daily  predniSONE   Tablet 40 milliGRAM(s) Oral daily  risperiDONE   Tablet 2 milliGRAM(s) Oral daily  sevelamer carbonate 800 milliGRAM(s) Oral three times a day with meals  simvastatin 20 milliGRAM(s) Oral at bedtime  tamsulosin 0.4 milliGRAM(s) Oral at bedtime    MEDICATIONS  (PRN):  acetaminophen     Tablet .. 650 milliGRAM(s) Oral every 6 hours PRN Temp greater or equal to 38C (100.4F), Mild Pain (1 - 3)  ALBUTerol    90 MICROgram(s) HFA Inhaler 2 Puff(s) Inhalation every 6 hours PRN Shortness of Breath and/or Wheezing  magnesium hydroxide Suspension 30 milliLiter(s) Oral at bedtime PRN Constipation      __________________________________________________  REVIEW OF SYSTEMS:    CONSTITUTIONAL: No fever,   EYES: no acute visual disturbances  NECK: No pain or stiffness  RESPIRATORY: No cough; No shortness of breath  CARDIOVASCULAR: No chest pain, no palpitations  GASTROINTESTINAL: No pain. No nausea or vomiting; No bloody BM  NEUROLOGICAL: No headache or numbness, no dizziness  MUSCULOSKELETAL: No joint pain, no muscle pain  GENITOURINARY: no dysuria, no frequency, no hematuria   ALL OTHER  ROS negative        Vital Signs Last 24 Hrs  T(C): 37.1 (17 Feb 2022 13:10), Max: 37.1 (17 Feb 2022 13:10)  T(F): 98.7 (17 Feb 2022 13:10), Max: 98.7 (17 Feb 2022 13:10)  HR: 81 (17 Feb 2022 15:02) (77 - 84)  BP: 109/61 (17 Feb 2022 13:10) (109/61 - 150/73)  BP(mean): --  RR: 18 (17 Feb 2022 15:02) (18 - 20)  SpO2: 94% (17 Feb 2022 15:02) (93% - 99%)    ________________________________________________  PHYSICAL EXAM:  GENERAL: NAD  HEENT: Normocephalic; atraumatic   CHEST/LUNG: Breathing nonlabored; diminished breath sounds on auscultation.   HEART: +S1 +S2  regular  ABDOMEN: Softly distended, Nontender; Bowel sounds present  EXTREMITIES: +2 bilateral radial pulses. bilateral LE + hyperpigmentation  SKIN: warm and dry; no rash  NERVOUS SYSTEM:  Awake and alert; Oriented  to place, person and time ; no new deficits    _________________________________________________  LABS:                        9.8    7.23  )-----------( 294      ( 17 Feb 2022 07:26 )             30.5     02-17    144  |  104  |  54<H>  ----------------------------<  113<H>  4.7   |  36<H>  |  3.70<H>    Ca    10.0      17 Feb 2022 07:26          CAPILLARY BLOOD GLUCOSE            RADIOLOGY & ADDITIONAL TESTS:    < from: Xray Chest 1 View-PORTABLE IMMEDIATE (02.14.22 @ 18:46) >    ACC: [id number] EXAM:  XR CHEST PORTABLE IMMED 1V                          PROCEDURE DATE:  02/14/2022          INTERPRETATION:  Chest one view    HISTORY: Sepsis    COMPARISON STUDY: 9/1/2021    Frontal expiratory view of the chest shows the heart to be similar in   size. The lungs show more atelectasis bilaterally with questionable small   left base infiltrate and there is no evidence of pneumothorax nor pleural   effusion. Old right rib fractures are again noted.    IMPRESSION:  Questionable left base infiltrate. Follow-up study is recommended as   clinically warranted.        Thank you for the courtesy of this referral.    --- End of Report ---      ROYCE PALACIOS MD; Attending Interventional Radiologist  This document has been electronically signed. Feb 15 2022  9:09AM        < from: Transthoracic Echocardiogram (02.15.22 @ 07:08) >    Patient name: GAYLA PEARCE  YOB: 1960   Age: 62 (M)   MR#: [id number]  Study Date: 2/15/2022  Location: [address] [place], [place] [address]Sonographer: Shannon [facility]  Study quality: Technically good  Referring Physician: Owen Phillips MD  Blood Pressure:98/46 mmHg  Height: 173 cm  Weight: 137 kg  BSA: 2.4 m2  ------------------------------------------------------------------------    PROCEDURE: Transthoracic echocardiogram with 2-D, M-Mode  and complete spectral and color flow Doppler.  INDICATION: Heart failure, unspecified (I50.9)  HISTORY:  ------------------------------------------------------------------------  DIMENSIONS:  Dimensions:     Normal Values:  LA:     4.7 cm    2.0 - 4.0 cm  Ao:     4.1 cm    2.0 - 3.8 cm  SEPTUM: 1.0 cm    0.6 - 1.2 cm  PWT:    1.1 cm    0.6 - 1.1 cm  LVIDd:  6.3 cm    3.0 - 5.6 cm  LVIDs:  4.2 cm    1.8 - 4.0 cm      Derived Variables:  LVMI: 117 g/m2  RWT: 0.34  Ejection Fraction Visual Estimate: 55-60 %  Ejection Fraction Shore: 60 %    ------------------------------------------------------------------------  OBSERVATIONS:  Mitral Valve: Normal mitral valve.  Aortic Root: Aortic Root: 4.1 cm.    Aortic Valve: Calcified trileaflet aortic valve with normal  opening.  Left Atrium: Normal left atrium.LA volume index = 26  cc/m2.  Left Ventricle: Normal Left Ventricular Systolic Function,  (EF = 55 to 60%) Normal left ventricular internal  dimensions and wall thicknesses. Normal diastolic function.  Right Heart: Normal right atrium. Normal right ventricular  size and systolic function (TAPSE  2.6cm). Normal tricuspid  valve. Normal pulmonic valve.  Pericardium/PleuraNormal pericardium with no pericardial  effusion.  Hemodynamic: RV systolic pressure is moderately increased  at  48 mm Hg.  ------------------------------------------------------------------------  CONCLUSIONS:  1. Normal mitral valve.  2. Calcified trileaflet aortic valve with normal opening.  3. Aortic Root: 4.1 cm.    4. Normal left atrium.  LA volume index = 26 cc/m2.  5. Normal left ventricular internal dimensions and wall  thicknesses.  6. Normal Left Ventricular Systolic Function,  (EF = 55 to  60%)  7. Normal diastolic function.  8. Normal right atrium.  9. Normal right ventricular size and systolic function  (TAPSE  2.6cm).  10. RV systolic pressure is moderately increased at  48 mm  Hg.  11. Normal tricuspid valve.  12. Normal pulmonic valve.  13. Normal pericardium with no pericardial effusion.    ------------------------------------------------------------------------  Confirmed on  2/16/2022 - 07:58:46 by Mora Kowalski MD  ------------------------------------------------------------------------    < end of copied text >        Culture - Blood (02.14.22 @ 20:45)    Specimen Source: .Blood Blood-Peripheral    Culture Results:   No growth to date.    Culture - Blood (02.14.22 @ 20:45)    Specimen Source: .Blood Blood-Peripheral    Culture Results:   No growth to date.    < from: Xray Chest 1 View-PORTABLE IMMEDIATE (02.14.22 @ 18:46) >    ACC: [id number] EXAM:  XR CHEST PORTABLE IMMED 1V                          PROCEDURE DATE:  02/14/2022          INTERPRETATION:  Chest one view    HISTORY: Sepsis    COMPARISON STUDY: 9/1/2021    Frontal expiratory view of the chest shows the heart to be similar in   size. The lungs show more atelectasis bilaterally with questionable small   left base infiltrate and there is no evidence of pneumothorax nor pleural   effusion. Old right rib fractures are again noted.    IMPRESSION:  Questionable left base infiltrate. Follow-up study is recommended as   clinically warranted.        Thank you for the courtesy of this referral.    --- End of Report ---    < end of copied text >  < from: CT Chest No Cont (02.16.22 @ 09:17) >    ACC: [id number] EXAM:  CT CHEST                          PROCEDURE DATE:  02/16/2022          INTERPRETATION:  Clinical information: Evaluate for infiltrate.    CT scan of the chest was obtained without administration of intravenous   contrast.    Calcifications are noted within both lobes of the thyroid gland.    No hilar and or mediastinal adenopathy is noted.    Heart is enlarged in size. Calcification of the aortic valve and the   coronary arteries is noted. No pericardial effusion is noted.    No endobronchial lesions are noted. Mucous/secretions are noted in the   trachea. Few small opacities are noted in the left lower lobe. Linear   opacities representing atelectasis are noted within both lungs. Trace   left pleural effusion is noted.    Below the diaphragm, visualized portions of the abdomen are unremarkable.    Degenerative changes of the spine are noted.    IMPRESSION: Few small opacities are noted in the left lower lobe.   Etiology is unclear. Follow-up CT scan is recommended in 3 months to   ensure resolution.    --- End of Report ---        ANKIT LEGGETT MD; Attending Radiologist  This document has been electronically signed. Feb 16 2022 10:28AM    < end of copied text >

## 2022-02-17 NOTE — PHYSICAL THERAPY INITIAL EVALUATION ADULT - DIAGNOSIS, PT EVAL
generalized weakness; impaired cardiopulmonary and functional status, CHF, difficulty performing bed mobility, standing, transfers, and ambulation

## 2022-02-17 NOTE — PROGRESS NOTE ADULT - SUBJECTIVE AND OBJECTIVE BOX
Patient is a 62y old  Male who presents with a chief complaint of Shortness of breath / CHF (2022 19:27)    PATIENT IS SEEN AND EXAMINED IN MEDICAL FLOOR.  SOWMYA [    ]    ALYSIA [   ]      GT [   ]    ALLERGIES:  No Known Allergies      Daily     Daily Weight in k.3 (2022 05:55)    VITALS:    Vital Signs Last 24 Hrs  T(C): 36.8 (2022 05:55), Max: 37 (2022 21:21)  T(F): 98.3 (2022 05:55), Max: 98.6 (2022 21:21)  HR: 82 (2022 05:55) (77 - 84)  BP: 117/60 (2022 05:55) (117/60 - 150/73)  BP(mean): --  RR: 18 (2022 05:55) (18 - 20)  SpO2: 99% (2022 05:55) (93% - 99%)    LABS:    CBC Full  -  ( 2022 07:26 )  WBC Count : 7.23 K/uL  RBC Count : 3.06 M/uL  Hemoglobin : 9.8 g/dL  Hematocrit : 30.5 %  Platelet Count - Automated : 294 K/uL  Mean Cell Volume : 99.7 fl  Mean Cell Hemoglobin : 32.0 pg  Mean Cell Hemoglobin Concentration : 32.1 gm/dL  Auto Neutrophil # : x  Auto Lymphocyte # : x  Auto Monocyte # : x  Auto Eosinophil # : x  Auto Basophil # : x  Auto Neutrophil % : x  Auto Lymphocyte % : x  Auto Monocyte % : x  Auto Eosinophil % : x  Auto Basophil % : x      02-17    144  |  104  |  54<H>  ----------------------------<  113<H>  4.7   |  36<H>  |  3.70<H>    Ca    10.0      2022 07:26      CAPILLARY BLOOD GLUCOSE              Creatinine Trend: 3.70<--, 3.67<--, 3.95<--, 3.82<--  I&O's Summary    2022 07:01  -  2022 07:00  --------------------------------------------------------  IN: 0 mL / OUT: 400 mL / NET: -400 mL        ABG - ( 2022 04:28 )  pH, Arterial: 7.31  pH, Blood: x     /  pCO2: 70    /  pO2: 74    / HCO3: 35    / Base Excess: 6.4   /  SaO2: 96                  .Blood Blood-Peripheral   @ 20:45   No growth to date.  --  --      Clean Catch Clean Catch (Midstream)   @ 20:43   No growth  --  --          MEDICATIONS:    MEDICATIONS  (STANDING):  aspirin  chewable 81 milliGRAM(s) Oral daily  azithromycin  IVPB 500 milliGRAM(s) IV Intermittent every 24 hours  budesonide 160 MICROgram(s)/formoterol 4.5 MICROgram(s) Inhaler 2 Puff(s) Inhalation two times a day  buPROPion XL (24-Hour) . 150 milliGRAM(s) Oral daily  busPIRone 10 milliGRAM(s) Oral two times a day  calcitriol   Capsule 0.25 MICROGram(s) Oral daily  cefTRIAXone   IVPB 1000 milliGRAM(s) IV Intermittent every 24 hours  cholecalciferol 2000 Unit(s) Oral daily  diVALproex  milliGRAM(s) Oral <User Schedule>  ferrous    sulfate 325 milliGRAM(s) Oral daily  finasteride 5 milliGRAM(s) Oral daily  folic acid 1 milliGRAM(s) Oral daily  furosemide   Injectable 60 milliGRAM(s) IV Push daily  levothyroxine 125 MICROGram(s) Oral daily  nicotine -  14 mG/24Hr(s) Patch 1 patch Transdermal daily  pantoprazole    Tablet 40 milliGRAM(s) Oral before breakfast  polyethylene glycol 3350 17 Gram(s) Oral daily  predniSONE   Tablet 40 milliGRAM(s) Oral daily  risperiDONE   Tablet 2 milliGRAM(s) Oral daily  sevelamer carbonate 800 milliGRAM(s) Oral three times a day with meals  simvastatin 20 milliGRAM(s) Oral at bedtime  tamsulosin 0.4 milliGRAM(s) Oral at bedtime      MEDICATIONS  (PRN):  acetaminophen     Tablet .. 650 milliGRAM(s) Oral every 6 hours PRN Temp greater or equal to 38C (100.4F), Mild Pain (1 - 3)  ALBUTerol    90 MICROgram(s) HFA Inhaler 2 Puff(s) Inhalation every 6 hours PRN Shortness of Breath and/or Wheezing  magnesium hydroxide Suspension 30 milliLiter(s) Oral at bedtime PRN Constipation      REVIEW OF SYSTEMS:                           ALL ROS DONE [ X   ]    CONSTITUTIONAL:  LETHARGIC [   ], FEVER [   ], UNRESPONSIVE [   ]  CVS:  CP  [   ], SOB, [   ], PALPITATIONS [   ], DIZZYNESS [   ]  RS: COUGH [   ], SPUTUM [   ]  GI: ABDOMINAL PAIN [   ], NAUSEA [   ], VOMITINGS [   ], DIARRHEA [   ], CONSTIPATION [   ]  :  DYSURIA [   ], NOCTURIA [   ], INCREASED FREQUENCY [   ], DRIBLING [   ],  SKELETAL: PAINFUL JOINTS [   ], SWOLLEN JOINTS [   ], NECK ACHE [   ], LOW BACK ACHE [   ],  SKIN : ULCERS [   ], RASH [   ], ITCHING [   ]  CNS: HEAD ACHE [   ], DOUBLE VISION [   ], BLURRED VISION [   ], AMS / CONFUSION [   ], SEIZURES [   ], WEAKNESS [   ],TINGLING / NUMBNESS [   ]      PHYSICAL EXAMINATION:  GENERAL APPEARANCE: NO DISTRESS  HEENT:  NO PALLOR, NO  JVD,  NO   NODES, NECK SUPPLE  CVS: S1 +, S2 +,   RS: AEEB,  OCCASIONAL  RALES +,   NO RONCHI  ABD: SOFT, NT, NO, BS +  EXT: PE +  SKIN: WARM,   SKELETAL:  ROM ACCEPTABLE  CNS:  AAO X 1-2    RADIOLOGY :    ACC: [id number] EXAM:  XR CHEST PORTABLE IMMED 1V                          PROCEDURE DATE:  2022      Frontal expiratory view of the chest shows the heart to be similar in   size. The lungs show more atelectasis bilaterally with questionable small   left base infiltrate and there is no evidence of pneumothorax nor pleural   effusion. Old right rib fractures are again noted.    IMPRESSION:  Questionable left base infiltrate. Follow-up study is recommended as   clinically warranted.      ASSESSMENT :     Heart failure    No pertinent past medical history    Hypothyroid    Hyperlipidemia    Schizophrenia    Schizoaffective disorder    Ambulatory dysfunction    Anemia    History of BPH    CKD (chronic kidney disease)    Chronic obstructive pulmonary disease (COPD)    Bipolar disorder    Bipolar disorder    No significant past surgical history        PLAN:  HPI:  Patient is 62 years old Morbid obese male from [facility] with past medical history of ambulates with walker has past medical hx of COPD not on O2 at home, hypothyroidism, anemia, left DVT, CKD, BPH, HLD, bipolar disorder, ambulatory dysfunction, lymphedema was brought to ED due to shortness of breath. Patient is not good historian. Patient states that he has this problem usually but he gets anxious and that's why it gets worse as he mentioned. Patient mentioned that he usually use 2L O2 at night. Patient was admitted to [facility] before in August and was started on BIPAP but for some insurance reasons, Patient wasn't discharged on BIPAP. Patient was found to have sepsis in ED as well and Antibiotics was given. CXR showed pulmonary edema and patient looks overloaded. During time of examination, Patient is AAox2. His speech was unclear. on 4L Oxygen NC. Patient denied chest pain, abdominal pain, leg pain, N/V or change in bowel movement. (2022 21:20)      # ACUTE HYPOXIC RESPIRATORY FAILURE S/T PULMONARY EDEMA, POSSIBLE PNA - PLACED ON ROCEPHIN AND AZITHROMYCIN, LASIX, F/U ECHOCARDIOGRAM  # CHRONIC COPD, MILD PULM HTN  # ?TEJ ON BIPAP [NONCOMPLIANT]  - F/U BCX  - STRICT IS AND OS  - CARDIOLOGY CONSULT    - CT CHEST - LLL OPACITIES  - ECHO - NORMAL LV EF, NORMAL MODERATE PULM HTN    - STARTED ON BIPAP, STEROIDS  - PULM CONSULT, ID CONSULT    # CKD - MONITOR CR, AVOID NEPHROTOXIC AGENTS   - FLOMAX,  FINASTERIDE  - WILL CONSULT NEPHROLOGY    # CONSTIPATION - PLANNED FOR TWE X 2 DAYS, MIRALAX    # HYPOTHYROIDISM  # MORBID OBESITY  # CHRONIC VENOUS INSUFFICIENCY, HX OF LEFT DVT    # GI AND DVT PPX   Patient is a 62y old  Male who presents with a chief complaint of Shortness of breath / CHF (2022 19:27)    PATIENT IS SEEN AND EXAMINED IN MEDICAL FLOOR.    ALLERGIES:  No Known Allergies      Daily     Daily Weight in k.3 (2022 05:55)    VITALS:    Vital Signs Last 24 Hrs  T(C): 36.8 (2022 05:55), Max: 37 (2022 21:21)  T(F): 98.3 (2022 05:55), Max: 98.6 (2022 21:21)  HR: 82 (2022 05:55) (77 - 84)  BP: 117/60 (2022 05:55) (117/60 - 150/73)  BP(mean): --  RR: 18 (2022 05:55) (18 - 20)  SpO2: 99% (2022 05:55) (93% - 99%)    LABS:    CBC Full  -  ( 2022 07:26 )  WBC Count : 7.23 K/uL  RBC Count : 3.06 M/uL  Hemoglobin : 9.8 g/dL  Hematocrit : 30.5 %  Platelet Count - Automated : 294 K/uL  Mean Cell Volume : 99.7 fl  Mean Cell Hemoglobin : 32.0 pg  Mean Cell Hemoglobin Concentration : 32.1 gm/dL  Auto Neutrophil # : x  Auto Lymphocyte # : x  Auto Monocyte # : x  Auto Eosinophil # : x  Auto Basophil # : x  Auto Neutrophil % : x  Auto Lymphocyte % : x  Auto Monocyte % : x  Auto Eosinophil % : x  Auto Basophil % : x      -17    144  |  104  |  54<H>  ----------------------------<  113<H>  4.7   |  36<H>  |  3.70<H>    Ca    10.0      2022 07:26      CAPILLARY BLOOD GLUCOSE              Creatinine Trend: 3.70<--, 3.67<--, 3.95<--, 3.82<--  I&O's Summary    2022 07:01  -  2022 07:00  --------------------------------------------------------  IN: 0 mL / OUT: 400 mL / NET: -400 mL        ABG - ( 2022 04:28 )  pH, Arterial: 7.31  pH, Blood: x     /  pCO2: 70    /  pO2: 74    / HCO3: 35    / Base Excess: 6.4   /  SaO2: 96                  .Blood Blood-Peripheral   @ 20:45   No growth to date.  --  --      Clean Catch Clean Catch (Midstream)   @ 20:43   No growth  --  --          MEDICATIONS:    MEDICATIONS  (STANDING):  aspirin  chewable 81 milliGRAM(s) Oral daily  azithromycin  IVPB 500 milliGRAM(s) IV Intermittent every 24 hours  budesonide 160 MICROgram(s)/formoterol 4.5 MICROgram(s) Inhaler 2 Puff(s) Inhalation two times a day  buPROPion XL (24-Hour) . 150 milliGRAM(s) Oral daily  busPIRone 10 milliGRAM(s) Oral two times a day  calcitriol   Capsule 0.25 MICROGram(s) Oral daily  cefTRIAXone   IVPB 1000 milliGRAM(s) IV Intermittent every 24 hours  cholecalciferol 2000 Unit(s) Oral daily  diVALproex  milliGRAM(s) Oral <User Schedule>  ferrous    sulfate 325 milliGRAM(s) Oral daily  finasteride 5 milliGRAM(s) Oral daily  folic acid 1 milliGRAM(s) Oral daily  furosemide   Injectable 60 milliGRAM(s) IV Push daily  levothyroxine 125 MICROGram(s) Oral daily  nicotine -  14 mG/24Hr(s) Patch 1 patch Transdermal daily  pantoprazole    Tablet 40 milliGRAM(s) Oral before breakfast  polyethylene glycol 3350 17 Gram(s) Oral daily  predniSONE   Tablet 40 milliGRAM(s) Oral daily  risperiDONE   Tablet 2 milliGRAM(s) Oral daily  sevelamer carbonate 800 milliGRAM(s) Oral three times a day with meals  simvastatin 20 milliGRAM(s) Oral at bedtime  tamsulosin 0.4 milliGRAM(s) Oral at bedtime      MEDICATIONS  (PRN):  acetaminophen     Tablet .. 650 milliGRAM(s) Oral every 6 hours PRN Temp greater or equal to 38C (100.4F), Mild Pain (1 - 3)  ALBUTerol    90 MICROgram(s) HFA Inhaler 2 Puff(s) Inhalation every 6 hours PRN Shortness of Breath and/or Wheezing  magnesium hydroxide Suspension 30 milliLiter(s) Oral at bedtime PRN Constipation      REVIEW OF SYSTEMS:                           ALL ROS DONE [ X   ]    CONSTITUTIONAL:  LETHARGIC [   ], FEVER [   ], UNRESPONSIVE [   ]  CVS:  CP  [   ], SOB, [   ], PALPITATIONS [   ], DIZZYNESS [   ]  RS: COUGH [   ], SPUTUM [   ]  GI: ABDOMINAL PAIN [   ], NAUSEA [   ], VOMITINGS [   ], DIARRHEA [   ], CONSTIPATION [   ]  :  DYSURIA [   ], NOCTURIA [   ], INCREASED FREQUENCY [   ], DRIBLING [   ],  SKELETAL: PAINFUL JOINTS [   ], SWOLLEN JOINTS [   ], NECK ACHE [   ], LOW BACK ACHE [   ],  SKIN : ULCERS [   ], RASH [   ], ITCHING [   ]  CNS: HEAD ACHE [   ], DOUBLE VISION [   ], BLURRED VISION [   ], AMS / CONFUSION [   ], SEIZURES [   ], WEAKNESS [   ],TINGLING / NUMBNESS [   ]      PHYSICAL EXAMINATION:  GENERAL APPEARANCE: NO DISTRESS  HEENT:  NO PALLOR, NO  JVD,  NO   NODES, NECK SUPPLE  CVS: S1 +, S2 +,   RS: AEEB,  OCCASIONAL  RALES +,   NO RONCHI  ABD: SOFT, NT, NO, BS +  EXT: PE +  SKIN: WARM,   SKELETAL:  ROM ACCEPTABLE  CNS:  AAO X 1-2    RADIOLOGY :    ACC: [id number] EXAM:  XR CHEST PORTABLE IMMED 1V                          PROCEDURE DATE:  2022      Frontal expiratory view of the chest shows the heart to be similar in   size. The lungs show more atelectasis bilaterally with questionable small   left base infiltrate and there is no evidence of pneumothorax nor pleural   effusion. Old right rib fractures are again noted.    IMPRESSION:  Questionable left base infiltrate. Follow-up study is recommended as   clinically warranted.      ASSESSMENT :     Heart failure    No pertinent past medical history    Hypothyroid    Hyperlipidemia    Schizophrenia    Schizoaffective disorder    Ambulatory dysfunction    Anemia    History of BPH    CKD (chronic kidney disease)    Chronic obstructive pulmonary disease (COPD)    Bipolar disorder    Bipolar disorder    No significant past surgical history        PLAN:  HPI:  Patient is 62 years old Morbid obese male from [facility] with past medical history of ambulates with walker has past medical hx of COPD not on O2 at home, hypothyroidism, anemia, left DVT, CKD, BPH, HLD, bipolar disorder, ambulatory dysfunction, lymphedema was brought to ED due to shortness of breath. Patient is not good historian. Patient states that he has this problem usually but he gets anxious and that's why it gets worse as he mentioned. Patient mentioned that he usually use 2L O2 at night. [facility] and was started on BIPAP but for some insurance reasons, Patient wasn't discharged on BIPAP. Patient was found to have sepsis in ED as well and Antibiotics was given. CXR showed pulmonary edema and patient looks overloaded. During time of examination, Patient is AAox2. His speech was unclear. on 4L Oxygen NC. Patient denied chest pain, abdominal pain, leg pain, N/V or change in bowel movement. (2022 21:20)    # F/U PT EVAL     # ACUTE HYPOXIC RESPIRATORY FAILURE S/T PULMONARY EDEMA, POSSIBLE PNA - PLACED ON ROCEPHIN AND AZITHROMYCIN, LASIX [REDUCING DOSE]  # CHRONIC COPD, MILD PULM HTN  # ?TEJ ON BIPAP [NONCOMPLIANT]  - F/U BCX  - STRICT IS AND OS  - CARDIOLOGY CONSULT    - CT CHEST - LLL OPACITIES  - ECHO - NORMAL LV EF, NORMAL MODERATE PULM HTN    - ON BIPAP, STEROIDS  - PULM CONSULT, ID CONSULT    # CKD - MONITOR CR, AVOID NEPHROTOXIC AGENTS   - FLOMAX,  FINASTERIDE  - WILL CONSULT NEPHROLOGY    # CONSTIPATION - PLANNED FOR TWE X 2 DAYS, MIRALAX    # HYPOTHYROIDISM  # MORBID OBESITY  # CHRONIC VENOUS INSUFFICIENCY, HX OF LEFT DVT    # GI AND DVT PPX

## 2022-02-17 NOTE — PROGRESS NOTE ADULT - PROBLEM SELECTOR PLAN 3
renal vs cardiac in origin   - Continue Lasix  - Continue to monitor Scr  - Daily weights, strict Is and Os
- Plan as above  - Cards opinion is that this is more renal related, had normal LV/RV function in past.  - Will recheck echo
renal vs cardiac in origin   Plan as above

## 2022-02-17 NOTE — PROGRESS NOTE ADULT - SUBJECTIVE AND OBJECTIVE BOX
NEPHROLOGY MEDICAL CARE, [facility] - Dr. Taj Valenzuela/ Dr. Hazel Solis/ Dr. Cosmo Root/ Dr. Fang Gifford    Patient was seen and examined at bedside.    CC: patient resting comfortable with oxygen    Vital Signs Last 24 Hrs  T(C): 37.1 (17 Feb 2022 13:10), Max: 37.1 (17 Feb 2022 13:10)  T(F): 98.7 (17 Feb 2022 13:10), Max: 98.7 (17 Feb 2022 13:10)  HR: 78 (17 Feb 2022 13:10) (77 - 84)  BP: 109/61 (17 Feb 2022 13:10) (109/61 - 150/73)  BP(mean): --  RR: 18 (17 Feb 2022 13:10) (18 - 20)  SpO2: 98% (17 Feb 2022 13:10) (93% - 99%)    02-16 @ 07:01  -  02-17 @ 07:00  --------------------------------------------------------  IN: 0 mL / OUT: 400 mL / NET: -400 mL        PHYSICAL EXAM:  General: No acute respiratory distress; obese  Eyes: conjunctiva and sclera clear  ENMT: Atraumatic, Normocephalic, supple, No JVD present. Moist mucous membranes  Respiratory: b/l poor air entry; No rales, rhonchi, wheezing  Cardiovasular: S1S2+; no m/r/g  Gastrointestinal: Soft, Non-tender, Nondistended; Bowel sounds present  Neuro:  Awake, Alert & Oriented X3  Ext:  2+ pedal edema with chronic venous stasis; No Cyanosis  Skin: chronic vascular changes of the b/l legs.    MEDICATIONS:  MEDICATIONS  (STANDING):  aspirin  chewable 81 milliGRAM(s) Oral daily  azithromycin  IVPB 500 milliGRAM(s) IV Intermittent every 24 hours  budesonide 160 MICROgram(s)/formoterol 4.5 MICROgram(s) Inhaler 2 Puff(s) Inhalation two times a day  buPROPion XL (24-Hour) . 150 milliGRAM(s) Oral daily  busPIRone 10 milliGRAM(s) Oral two times a day  calcitriol   Capsule 0.25 MICROGram(s) Oral daily  cefTRIAXone   IVPB 1000 milliGRAM(s) IV Intermittent every 24 hours  cholecalciferol 2000 Unit(s) Oral daily  diVALproex  milliGRAM(s) Oral <User Schedule>  ferrous    sulfate 325 milliGRAM(s) Oral daily  finasteride 5 milliGRAM(s) Oral daily  folic acid 1 milliGRAM(s) Oral daily  furosemide   Injectable 60 milliGRAM(s) IV Push daily  levothyroxine 125 MICROGram(s) Oral daily  nicotine -  14 mG/24Hr(s) Patch 1 patch Transdermal daily  pantoprazole    Tablet 40 milliGRAM(s) Oral before breakfast  polyethylene glycol 3350 17 Gram(s) Oral daily  predniSONE   Tablet 40 milliGRAM(s) Oral daily  risperiDONE   Tablet 2 milliGRAM(s) Oral daily  sevelamer carbonate 800 milliGRAM(s) Oral three times a day with meals  simvastatin 20 milliGRAM(s) Oral at bedtime  tamsulosin 0.4 milliGRAM(s) Oral at bedtime    MEDICATIONS  (PRN):  acetaminophen     Tablet .. 650 milliGRAM(s) Oral every 6 hours PRN Temp greater or equal to 38C (100.4F), Mild Pain (1 - 3)  ALBUTerol    90 MICROgram(s) HFA Inhaler 2 Puff(s) Inhalation every 6 hours PRN Shortness of Breath and/or Wheezing  magnesium hydroxide Suspension 30 milliLiter(s) Oral at bedtime PRN Constipation          LABS:                        9.8    7.23  )-----------( 294      ( 17 Feb 2022 07:26 )             30.5     02-17    144  |  104  |  54<H>  ----------------------------<  113<H>  4.7   |  36<H>  |  3.70<H>    Ca    10.0      17 Feb 2022 07:26          Intact PTH: 214 pg/mL (02-17 @ 11:45)    Urine studies    PTH and Vit D:  Intact PTH: 214 pg/mL (02-17 @ 11:45)

## 2022-02-17 NOTE — PROGRESS NOTE ADULT - PROBLEM SELECTOR PLAN 2
p/w tachycardia, fever, hypotension, CXR with left base infiltrates, suspected PNA   since resolved  -continue Zithromax, Ceftriaxone for PNA  - 2/14 blood cultures; NGTD   -ID, Dr Nolasco, following

## 2022-02-18 ENCOUNTER — TRANSCRIPTION ENCOUNTER (OUTPATIENT)
Age: 62
End: 2022-02-18

## 2022-02-18 VITALS
RESPIRATION RATE: 18 BRPM | HEART RATE: 72 BPM | SYSTOLIC BLOOD PRESSURE: 138 MMHG | TEMPERATURE: 98 F | OXYGEN SATURATION: 95 % | DIASTOLIC BLOOD PRESSURE: 72 MMHG

## 2022-02-18 LAB
ANION GAP SERPL CALC-SCNC: 3 MMOL/L — LOW (ref 5–17)
BASOPHILS # BLD AUTO: 0.05 K/UL — SIGNIFICANT CHANGE UP (ref 0–0.2)
BASOPHILS NFR BLD AUTO: 0.6 % — SIGNIFICANT CHANGE UP (ref 0–2)
BUN SERPL-MCNC: 63 MG/DL — HIGH (ref 7–18)
CALCIUM SERPL-MCNC: 10.1 MG/DL — SIGNIFICANT CHANGE UP (ref 8.4–10.5)
CHLORIDE SERPL-SCNC: 106 MMOL/L — SIGNIFICANT CHANGE UP (ref 96–108)
CO2 SERPL-SCNC: 36 MMOL/L — HIGH (ref 22–31)
CREAT SERPL-MCNC: 3.87 MG/DL — HIGH (ref 0.5–1.3)
EOSINOPHIL # BLD AUTO: 0.03 K/UL — SIGNIFICANT CHANGE UP (ref 0–0.5)
EOSINOPHIL NFR BLD AUTO: 0.4 % — SIGNIFICANT CHANGE UP (ref 0–6)
GLUCOSE SERPL-MCNC: 98 MG/DL — SIGNIFICANT CHANGE UP (ref 70–99)
HCT VFR BLD CALC: 30.1 % — LOW (ref 39–50)
HGB BLD-MCNC: 9.5 G/DL — LOW (ref 13–17)
IMM GRANULOCYTES NFR BLD AUTO: 0.6 % — SIGNIFICANT CHANGE UP (ref 0–1.5)
LYMPHOCYTES # BLD AUTO: 1.59 K/UL — SIGNIFICANT CHANGE UP (ref 1–3.3)
LYMPHOCYTES # BLD AUTO: 19 % — SIGNIFICANT CHANGE UP (ref 13–44)
MCHC RBC-ENTMCNC: 31.6 GM/DL — LOW (ref 32–36)
MCHC RBC-ENTMCNC: 31.9 PG — SIGNIFICANT CHANGE UP (ref 27–34)
MCV RBC AUTO: 101 FL — HIGH (ref 80–100)
MONOCYTES # BLD AUTO: 0.89 K/UL — SIGNIFICANT CHANGE UP (ref 0–0.9)
MONOCYTES NFR BLD AUTO: 10.6 % — SIGNIFICANT CHANGE UP (ref 2–14)
NEUTROPHILS # BLD AUTO: 5.75 K/UL — SIGNIFICANT CHANGE UP (ref 1.8–7.4)
NEUTROPHILS NFR BLD AUTO: 68.8 % — SIGNIFICANT CHANGE UP (ref 43–77)
NRBC # BLD: 0 /100 WBCS — SIGNIFICANT CHANGE UP (ref 0–0)
PLATELET # BLD AUTO: 268 K/UL — SIGNIFICANT CHANGE UP (ref 150–400)
POTASSIUM SERPL-MCNC: 5 MMOL/L — SIGNIFICANT CHANGE UP (ref 3.5–5.3)
POTASSIUM SERPL-SCNC: 5 MMOL/L — SIGNIFICANT CHANGE UP (ref 3.5–5.3)
RBC # BLD: 2.98 M/UL — LOW (ref 4.2–5.8)
RBC # FLD: 13.9 % — SIGNIFICANT CHANGE UP (ref 10.3–14.5)
SARS-COV-2 RNA SPEC QL NAA+PROBE: SIGNIFICANT CHANGE UP
SODIUM SERPL-SCNC: 145 MMOL/L — SIGNIFICANT CHANGE UP (ref 135–145)
WBC # BLD: 8.36 K/UL — SIGNIFICANT CHANGE UP (ref 3.8–10.5)
WBC # FLD AUTO: 8.36 K/UL — SIGNIFICANT CHANGE UP (ref 3.8–10.5)

## 2022-02-18 PROCEDURE — 82607 VITAMIN B-12: CPT

## 2022-02-18 PROCEDURE — 83036 HEMOGLOBIN GLYCOSYLATED A1C: CPT

## 2022-02-18 PROCEDURE — 80053 COMPREHEN METABOLIC PANEL: CPT

## 2022-02-18 PROCEDURE — 71250 CT THORAX DX C-: CPT

## 2022-02-18 PROCEDURE — 93306 TTE W/DOPPLER COMPLETE: CPT

## 2022-02-18 PROCEDURE — 84100 ASSAY OF PHOSPHORUS: CPT

## 2022-02-18 PROCEDURE — 71045 X-RAY EXAM CHEST 1 VIEW: CPT

## 2022-02-18 PROCEDURE — 83970 ASSAY OF PARATHORMONE: CPT

## 2022-02-18 PROCEDURE — 85379 FIBRIN DEGRADATION QUANT: CPT

## 2022-02-18 PROCEDURE — 80061 LIPID PANEL: CPT

## 2022-02-18 PROCEDURE — 85025 COMPLETE CBC W/AUTO DIFF WBC: CPT

## 2022-02-18 PROCEDURE — 94640 AIRWAY INHALATION TREATMENT: CPT

## 2022-02-18 PROCEDURE — 94660 CPAP INITIATION&MGMT: CPT

## 2022-02-18 PROCEDURE — 99291 CRITICAL CARE FIRST HOUR: CPT | Mod: 25

## 2022-02-18 PROCEDURE — 36415 COLL VENOUS BLD VENIPUNCTURE: CPT

## 2022-02-18 PROCEDURE — 87040 BLOOD CULTURE FOR BACTERIA: CPT

## 2022-02-18 PROCEDURE — 83880 ASSAY OF NATRIURETIC PEPTIDE: CPT

## 2022-02-18 PROCEDURE — 84295 ASSAY OF SERUM SODIUM: CPT

## 2022-02-18 PROCEDURE — 84484 ASSAY OF TROPONIN QUANT: CPT

## 2022-02-18 PROCEDURE — 82330 ASSAY OF CALCIUM: CPT

## 2022-02-18 PROCEDURE — 85730 THROMBOPLASTIN TIME PARTIAL: CPT

## 2022-02-18 PROCEDURE — 87635 SARS-COV-2 COVID-19 AMP PRB: CPT

## 2022-02-18 PROCEDURE — 81001 URINALYSIS AUTO W/SCOPE: CPT

## 2022-02-18 PROCEDURE — 84132 ASSAY OF SERUM POTASSIUM: CPT

## 2022-02-18 PROCEDURE — 82803 BLOOD GASES ANY COMBINATION: CPT

## 2022-02-18 PROCEDURE — 83735 ASSAY OF MAGNESIUM: CPT

## 2022-02-18 PROCEDURE — 71045 X-RAY EXAM CHEST 1 VIEW: CPT | Mod: 26

## 2022-02-18 PROCEDURE — 85610 PROTHROMBIN TIME: CPT

## 2022-02-18 PROCEDURE — 82746 ASSAY OF FOLIC ACID SERUM: CPT

## 2022-02-18 PROCEDURE — 82310 ASSAY OF CALCIUM: CPT

## 2022-02-18 PROCEDURE — 85027 COMPLETE CBC AUTOMATED: CPT

## 2022-02-18 PROCEDURE — 83605 ASSAY OF LACTIC ACID: CPT

## 2022-02-18 PROCEDURE — 0225U NFCT DS DNA&RNA 21 SARSCOV2: CPT

## 2022-02-18 PROCEDURE — 96375 TX/PRO/DX INJ NEW DRUG ADDON: CPT

## 2022-02-18 PROCEDURE — 87086 URINE CULTURE/COLONY COUNT: CPT

## 2022-02-18 PROCEDURE — 93005 ELECTROCARDIOGRAM TRACING: CPT

## 2022-02-18 PROCEDURE — 96365 THER/PROPH/DIAG IV INF INIT: CPT

## 2022-02-18 PROCEDURE — 80048 BASIC METABOLIC PNL TOTAL CA: CPT

## 2022-02-18 RX ORDER — CEFUROXIME AXETIL 250 MG
1 TABLET ORAL
Qty: 0 | Refills: 0 | DISCHARGE
Start: 2022-02-18 | End: 2022-02-20

## 2022-02-18 RX ADMIN — PANTOPRAZOLE SODIUM 40 MILLIGRAM(S): 20 TABLET, DELAYED RELEASE ORAL at 07:06

## 2022-02-18 RX ADMIN — Medication 40 MILLIGRAM(S): at 07:06

## 2022-02-18 RX ADMIN — Medication 81 MILLIGRAM(S): at 12:31

## 2022-02-18 RX ADMIN — SEVELAMER CARBONATE 800 MILLIGRAM(S): 2400 POWDER, FOR SUSPENSION ORAL at 09:15

## 2022-02-18 RX ADMIN — Medication 10 MILLIGRAM(S): at 17:28

## 2022-02-18 RX ADMIN — RISPERIDONE 2 MILLIGRAM(S): 4 TABLET ORAL at 12:32

## 2022-02-18 RX ADMIN — Medication 325 MILLIGRAM(S): at 12:32

## 2022-02-18 RX ADMIN — Medication 10 MILLIGRAM(S): at 07:06

## 2022-02-18 RX ADMIN — BUPROPION HYDROCHLORIDE 150 MILLIGRAM(S): 150 TABLET, EXTENDED RELEASE ORAL at 12:31

## 2022-02-18 RX ADMIN — DIVALPROEX SODIUM 500 MILLIGRAM(S): 500 TABLET, DELAYED RELEASE ORAL at 09:14

## 2022-02-18 RX ADMIN — Medication 2000 UNIT(S): at 12:31

## 2022-02-18 RX ADMIN — POLYETHYLENE GLYCOL 3350 17 GRAM(S): 17 POWDER, FOR SOLUTION ORAL at 12:32

## 2022-02-18 RX ADMIN — CEFTRIAXONE 100 MILLIGRAM(S): 500 INJECTION, POWDER, FOR SOLUTION INTRAMUSCULAR; INTRAVENOUS at 18:18

## 2022-02-18 RX ADMIN — Medication 60 MILLIGRAM(S): at 07:07

## 2022-02-18 RX ADMIN — FINASTERIDE 5 MILLIGRAM(S): 5 TABLET, FILM COATED ORAL at 12:32

## 2022-02-18 RX ADMIN — SEVELAMER CARBONATE 800 MILLIGRAM(S): 2400 POWDER, FOR SUSPENSION ORAL at 17:27

## 2022-02-18 RX ADMIN — HEPARIN SODIUM 7500 UNIT(S): 5000 INJECTION INTRAVENOUS; SUBCUTANEOUS at 07:07

## 2022-02-18 RX ADMIN — BUDESONIDE AND FORMOTEROL FUMARATE DIHYDRATE 2 PUFF(S): 160; 4.5 AEROSOL RESPIRATORY (INHALATION) at 09:15

## 2022-02-18 RX ADMIN — CALCITRIOL 0.25 MICROGRAM(S): 0.5 CAPSULE ORAL at 12:32

## 2022-02-18 RX ADMIN — DIVALPROEX SODIUM 500 MILLIGRAM(S): 500 TABLET, DELAYED RELEASE ORAL at 17:29

## 2022-02-18 RX ADMIN — SEVELAMER CARBONATE 800 MILLIGRAM(S): 2400 POWDER, FOR SUSPENSION ORAL at 12:31

## 2022-02-18 RX ADMIN — Medication 125 MICROGRAM(S): at 07:06

## 2022-02-18 RX ADMIN — HEPARIN SODIUM 7500 UNIT(S): 5000 INJECTION INTRAVENOUS; SUBCUTANEOUS at 17:28

## 2022-02-18 RX ADMIN — Medication 1 MILLIGRAM(S): at 12:32

## 2022-02-18 NOTE — PROGRESS NOTE ADULT - ASSESSMENT
1. SHEYLA due to possible CRS  -scr is slight increased with stable electrolytes and close to baseline. Will accept higher bun/cr to optimize volume and respiratory status. continue lasix 60mg iv bid if scr worsens tomorrow then decrease to lasix 60mg daily.   -Adjust meds to eGFR and avoid IV Gadolinium contrast,NSAIDs, and phosphate enema.  -Monitor I/O's daily.   -Monitor SMA daily.  2. CKD stage 4 most likely due to ischemic nephropathy  -baseline scr around 3.1mg/dL  -Keep patient euvolemic and renal diet  -Avoid Nephrotoxic Meds/ Agents such as (NSAIDs, IV contrast, Aminoglycosides such as gentamicin, -Gadolinium contrast, Phosphate containing enemas, etc..)  -Adjust Medications according to eGFR  3. Anemia:  -on iron tabs.   -F/u CBC daily  -transfuse if HB < 7.0.  3. Mineral Bone Disease:  -phos is elevated and on renvela 1 tab tid and on calcitriol 0.25mcg daily;  improving  (334)   4. Respiratory Acidosis: on bipap prn and echo noted. on lasix. cardiology consulted.     Discussed the assessment and plan with Primary Team/Nurse

## 2022-02-18 NOTE — PROGRESS NOTE ADULT - SUBJECTIVE AND OBJECTIVE BOX
Patient is a 62y old  Male who presents with a chief complaint of Shortness of breath / CHF (18 Feb 2022 16:07)    PATIENT IS SEEN AND EXAMINED IN MEDICAL FLOOR.  SOWMYA [    ]    ALYSIA [   ]      GT [   ]    ALLERGIES:  No Known Allergies      Daily     Daily     VITALS:    Vital Signs Last 24 Hrs  T(C): 36.9 (18 Feb 2022 12:55), Max: 37.6 (17 Feb 2022 20:35)  T(F): 98.4 (18 Feb 2022 12:55), Max: 99.6 (17 Feb 2022 20:35)  HR: 72 (18 Feb 2022 12:55) (72 - 86)  BP: 138/72 (18 Feb 2022 12:55) (104/49 - 138/72)  BP(mean): --  RR: 18 (18 Feb 2022 12:55) (17 - 19)  SpO2: 95% (18 Feb 2022 12:55) (94% - 95%)    LABS:    CBC Full  -  ( 18 Feb 2022 07:00 )  WBC Count : 8.36 K/uL  RBC Count : 2.98 M/uL  Hemoglobin : 9.5 g/dL  Hematocrit : 30.1 %  Platelet Count - Automated : 268 K/uL  Mean Cell Volume : 101.0 fl  Mean Cell Hemoglobin : 31.9 pg  Mean Cell Hemoglobin Concentration : 31.6 gm/dL  Auto Neutrophil # : 5.75 K/uL  Auto Lymphocyte # : 1.59 K/uL  Auto Monocyte # : 0.89 K/uL  Auto Eosinophil # : 0.03 K/uL  Auto Basophil # : 0.05 K/uL  Auto Neutrophil % : 68.8 %  Auto Lymphocyte % : 19.0 %  Auto Monocyte % : 10.6 %  Auto Eosinophil % : 0.4 %  Auto Basophil % : 0.6 %      02-18    145  |  106  |  63<H>  ----------------------------<  98  5.0   |  36<H>  |  3.87<H>    Ca    10.1      18 Feb 2022 07:00      CAPILLARY BLOOD GLUCOSE              Creatinine Trend: 3.87<--, 3.70<--, 3.67<--, 3.95<--, 3.82<--  I&O's Summary          .Blood Blood-Peripheral  02-14 @ 20:45   No growth to date.  --  --      Clean Catch Clean Catch (Midstream)  02-14 @ 20:43   No growth  --  --          MEDICATIONS:    MEDICATIONS  (STANDING):  aspirin  chewable 81 milliGRAM(s) Oral daily  azithromycin  IVPB 500 milliGRAM(s) IV Intermittent every 24 hours  budesonide 160 MICROgram(s)/formoterol 4.5 MICROgram(s) Inhaler 2 Puff(s) Inhalation two times a day  buPROPion XL (24-Hour) . 150 milliGRAM(s) Oral daily  busPIRone 10 milliGRAM(s) Oral two times a day  calcitriol   Capsule 0.25 MICROGram(s) Oral daily  cefTRIAXone   IVPB 1000 milliGRAM(s) IV Intermittent every 24 hours  cholecalciferol 2000 Unit(s) Oral daily  diVALproex  milliGRAM(s) Oral <User Schedule>  ferrous    sulfate 325 milliGRAM(s) Oral daily  finasteride 5 milliGRAM(s) Oral daily  folic acid 1 milliGRAM(s) Oral daily  furosemide   Injectable 60 milliGRAM(s) IV Push daily  heparin   Injectable 7500 Unit(s) SubCutaneous every 12 hours  levothyroxine 125 MICROGram(s) Oral daily  nicotine -  14 mG/24Hr(s) Patch 1 patch Transdermal daily  pantoprazole    Tablet 40 milliGRAM(s) Oral before breakfast  polyethylene glycol 3350 17 Gram(s) Oral daily  predniSONE   Tablet 40 milliGRAM(s) Oral daily  risperiDONE   Tablet 2 milliGRAM(s) Oral daily  sevelamer carbonate 800 milliGRAM(s) Oral three times a day with meals  simvastatin 20 milliGRAM(s) Oral at bedtime  tamsulosin 0.4 milliGRAM(s) Oral at bedtime      MEDICATIONS  (PRN):  acetaminophen     Tablet .. 650 milliGRAM(s) Oral every 6 hours PRN Temp greater or equal to 38C (100.4F), Mild Pain (1 - 3)  ALBUTerol    90 MICROgram(s) HFA Inhaler 2 Puff(s) Inhalation every 6 hours PRN Shortness of Breath and/or Wheezing  magnesium hydroxide Suspension 30 milliLiter(s) Oral at bedtime PRN Constipation      REVIEW OF SYSTEMS:                           ALL ROS DONE [ X   ]    CONSTITUTIONAL:  LETHARGIC [   ], FEVER [   ], UNRESPONSIVE [   ]  CVS:  CP  [   ], SOB, [   ], PALPITATIONS [   ], DIZZYNESS [   ]  RS: COUGH [   ], SPUTUM [   ]  GI: ABDOMINAL PAIN [   ], NAUSEA [   ], VOMITINGS [   ], DIARRHEA [   ], CONSTIPATION [   ]  :  DYSURIA [   ], NOCTURIA [   ], INCREASED FREQUENCY [   ], DRIBLING [   ],  SKELETAL: PAINFUL JOINTS [   ], SWOLLEN JOINTS [   ], NECK ACHE [   ], LOW BACK ACHE [   ],  SKIN : ULCERS [   ], RASH [   ], ITCHING [   ]  CNS: HEAD ACHE [   ], DOUBLE VISION [   ], BLURRED VISION [   ], AMS / CONFUSION [   ], SEIZURES [   ], WEAKNESS [   ],TINGLING / NUMBNESS [   ]      PHYSICAL EXAMINATION:  GENERAL APPEARANCE: NO DISTRESS  HEENT:  NO PALLOR, NO  JVD,  NO   NODES, NECK SUPPLE  CVS: S1 +, S2 +,   RS: AEEB,  OCCASIONAL  RALES +,   NO RONCHI  ABD: SOFT, NT, NO, BS +  EXT: PE +  SKIN: WARM,   SKELETAL:  ROM ACCEPTABLE  CNS:  AAO X 1-2    RADIOLOGY :    ACC: [id number] EXAM:  XR CHEST PORTABLE IMMED 1V                          PROCEDURE DATE:  02/14/2022      Frontal expiratory view of the chest shows the heart to be similar in   size. The lungs show more atelectasis bilaterally with questionable small   left base infiltrate and there is no evidence of pneumothorax nor pleural   effusion. Old right rib fractures are again noted.    IMPRESSION:  Questionable left base infiltrate. Follow-up study is recommended as   clinically warranted.      ASSESSMENT :     Heart failure    No pertinent past medical history    Hypothyroid    Hyperlipidemia    Schizophrenia    Schizoaffective disorder    Ambulatory dysfunction    Anemia    History of BPH    CKD (chronic kidney disease)    Chronic obstructive pulmonary disease (COPD)    Bipolar disorder    Bipolar disorder    No significant past surgical history        PLAN:  HPI:  Patient is 62 years old Morbid obese male from [facility] with past medical history of ambulates with walker has past medical hx of COPD not on O2 at home, hypothyroidism, anemia, left DVT, CKD, BPH, HLD, bipolar disorder, ambulatory dysfunction, lymphedema was brought to ED due to shortness of breath. Patient is not good historian. Patient states that he has this problem usually but he gets anxious and that's why it gets worse as he mentioned. Patient mentioned that he usually use 2L O2 at night. Patient was admitted to [facility] before in August and was started on BIPAP but for some insurance reasons, Patient wasn't discharged on BIPAP. Patient was found to have sepsis in ED as well and Antibiotics was given. CXR showed pulmonary edema and patient looks overloaded. During time of examination, Patient is AAox2. His speech was unclear. on 4L Oxygen NC. Patient denied chest pain, abdominal pain, leg pain, N/V or change in bowel movement. (14 Feb 2022 21:20)    # D/C PLAN TO [facility] TG - ON PO LASIX, CEFTIN [TO COMPLETE 7 DAYS] , STEROIDS, BIPAP    # ACUTE HYPOXIC RESPIRATORY FAILURE S/T PULMONARY EDEMA, POSSIBLE PNA - PLACED ON ROCEPHIN AND AZITHROMYCIN, LASIX SWITCHED TO PO  # CHRONIC COPD, MILD PULM HTN  # ?TEJ ON BIPAP [NONCOMPLIANT]  -  BCX - NGTD  - STRICT IS AND OS  - CARDIOLOGY CONSULT    - CT CHEST - LLL OPACITIES  - ECHO - NORMAL LV EF, NORMAL MODERATE PULM HTN    - ON BIPAP, STEROIDS  - PULM CONSULT, ID CONSULT    # CKD - MONITOR CR, AVOID NEPHROTOXIC AGENTS   - FLOMAX,  FINASTERIDE  - WILL CONSULT NEPHROLOGY    # CONSTIPATION - PLANNED FOR TWE X 2 DAYS, MIRALAX    # HYPOTHYROIDISM  # MORBID OBESITY  # CHRONIC VENOUS INSUFFICIENCY, HX OF LEFT DVT    # GI AND DVT PPX

## 2022-02-18 NOTE — PROGRESS NOTE ADULT - SUBJECTIVE AND OBJECTIVE BOX
C A R D I O L O G Y  **********************************     DATE OF SERVICE: 02-18-22    Patient denies chest pain or shortness of breath.   Review of symptoms otherwise negative.    acetaminophen     Tablet .. 650 milliGRAM(s) Oral every 6 hours PRN  ALBUTerol    90 MICROgram(s) HFA Inhaler 2 Puff(s) Inhalation every 6 hours PRN  aspirin  chewable 81 milliGRAM(s) Oral daily  azithromycin  IVPB 500 milliGRAM(s) IV Intermittent every 24 hours  budesonide 160 MICROgram(s)/formoterol 4.5 MICROgram(s) Inhaler 2 Puff(s) Inhalation two times a day  buPROPion XL (24-Hour) . 150 milliGRAM(s) Oral daily  busPIRone 10 milliGRAM(s) Oral two times a day  calcitriol   Capsule 0.25 MICROGram(s) Oral daily  cefTRIAXone   IVPB 1000 milliGRAM(s) IV Intermittent every 24 hours  cholecalciferol 2000 Unit(s) Oral daily  diVALproex  milliGRAM(s) Oral <User Schedule>  ferrous    sulfate 325 milliGRAM(s) Oral daily  finasteride 5 milliGRAM(s) Oral daily  folic acid 1 milliGRAM(s) Oral daily  furosemide   Injectable 60 milliGRAM(s) IV Push daily  heparin   Injectable 7500 Unit(s) SubCutaneous every 12 hours  levothyroxine 125 MICROGram(s) Oral daily  magnesium hydroxide Suspension 30 milliLiter(s) Oral at bedtime PRN  nicotine -  14 mG/24Hr(s) Patch 1 patch Transdermal daily  pantoprazole    Tablet 40 milliGRAM(s) Oral before breakfast  polyethylene glycol 3350 17 Gram(s) Oral daily  predniSONE   Tablet 40 milliGRAM(s) Oral daily  risperiDONE   Tablet 2 milliGRAM(s) Oral daily  sevelamer carbonate 800 milliGRAM(s) Oral three times a day with meals  simvastatin 20 milliGRAM(s) Oral at bedtime  tamsulosin 0.4 milliGRAM(s) Oral at bedtime                            9.5    8.36  )-----------( 268      ( 18 Feb 2022 07:00 )             30.1       Hemoglobin: 9.5 g/dL (02-18 @ 07:00)  Hemoglobin: 9.8 g/dL (02-17 @ 07:26)  Hemoglobin: 8.8 g/dL (02-16 @ 06:37)  Hemoglobin: 8.8 g/dL (02-15 @ 05:43)  Hemoglobin: 9.6 g/dL (02-14 @ 18:27)      02-18    145  |  106  |  63<H>  ----------------------------<  98  5.0   |  36<H>  |  3.87<H>    Ca    10.1      18 Feb 2022 07:00      Creatinine Trend: 3.87<--, 3.70<--, 3.67<--, 3.95<--, 3.82<--    COAGS:           T(C): 36.1 (02-18-22 @ 05:18), Max: 37.6 (02-17-22 @ 20:35)  HR: 80 (02-18-22 @ 05:18) (64 - 94)  BP: 104/49 (02-18-22 @ 05:18) (102/68 - 135/63)  RR: 19 (02-18-22 @ 05:18) (16 - 19)  SpO2: 95% (02-18-22 @ 05:18) (93% - 98%)  Wt(kg): --    I&O's Summary            HEENT:  (-)icterus (-)pallor  CV: N S1 S2 1/6 ABIGAIL (+)2 Pulses B/l  Resp:  Clear to ausculatation B/L, normal effort  GI: (+) BS Soft, NT, ND  Lymph:  (+)Edema/ Stasis , (-)obvious lymphadenopathy  Skin: Warm to touch, Normal turgor  Psych: Appropriate mood and affect          ASSESSMENT/PLAN: 	62y Male Morbid obese male from [facility] with past medical history of ambulates with walker has past medical hx of COPD not on O2 at home, normal LV/ RV function  hypothyroidism, anemia, left DVT, CKD, BPH, HLD, bipolar disorder, ambulatory dysfunction, lymphedema was brought to ED due to shortness of breath pulmonary vascular congestion.    - Suspect pulmonary vascular congestion is at least in part driven  by Advanced renal disease.  CT scan with no pulmonary edema at present  - keep net negative diuretics per renal  -  renal and pulm eval appreciated   - echo with no interval changes  - not in clinical CHF    Austen Javier MD, [facility]  BEEPER [phone]

## 2022-02-18 NOTE — PROGRESS NOTE ADULT - SUBJECTIVE AND OBJECTIVE BOX
NEPHROLOGY MEDICAL CARE, [facility] - Dr. Taj Valenzuela/ Dr. Hazel Solis/ Dr. Cosmo Root/ Dr. Fang Gifford    Patient was seen and examined at bedside.    CC: patient is not sob. resting comfortable.    Vital Signs Last 24 Hrs  T(C): 36.9 (18 Feb 2022 12:55), Max: 37.6 (17 Feb 2022 20:35)  T(F): 98.4 (18 Feb 2022 12:55), Max: 99.6 (17 Feb 2022 20:35)  HR: 72 (18 Feb 2022 12:55) (72 - 86)  BP: 138/72 (18 Feb 2022 12:55) (104/49 - 138/72)  BP(mean): --  RR: 18 (18 Feb 2022 12:55) (17 - 19)  SpO2: 95% (18 Feb 2022 12:55) (94% - 95%)      PHYSICAL EXAM:  General: No acute respiratory distress; obese  Eyes: conjunctiva and sclera clear  ENMT: Atraumatic, Normocephalic, supple, No JVD present. Moist mucous membranes  Respiratory: b/l poor air entry; No rales, rhonchi, wheezing  Cardiovasular: S1S2+; no m/r/g  Gastrointestinal: Soft, Non-tender, Nondistended; Bowel sounds present  Neuro:  Awake, Alert & Oriented X3  Ext:  2+ pedal edema with chronic venous stasis; No Cyanosis  Skin: chronic vascular changes of the b/l legs.    MEDICATIONS:  MEDICATIONS  (STANDING):  aspirin  chewable 81 milliGRAM(s) Oral daily  azithromycin  IVPB 500 milliGRAM(s) IV Intermittent every 24 hours  budesonide 160 MICROgram(s)/formoterol 4.5 MICROgram(s) Inhaler 2 Puff(s) Inhalation two times a day  buPROPion XL (24-Hour) . 150 milliGRAM(s) Oral daily  busPIRone 10 milliGRAM(s) Oral two times a day  calcitriol   Capsule 0.25 MICROGram(s) Oral daily  cefTRIAXone   IVPB 1000 milliGRAM(s) IV Intermittent every 24 hours  cholecalciferol 2000 Unit(s) Oral daily  diVALproex  milliGRAM(s) Oral <User Schedule>  ferrous    sulfate 325 milliGRAM(s) Oral daily  finasteride 5 milliGRAM(s) Oral daily  folic acid 1 milliGRAM(s) Oral daily  furosemide   Injectable 60 milliGRAM(s) IV Push daily  heparin   Injectable 7500 Unit(s) SubCutaneous every 12 hours  levothyroxine 125 MICROGram(s) Oral daily  nicotine -  14 mG/24Hr(s) Patch 1 patch Transdermal daily  pantoprazole    Tablet 40 milliGRAM(s) Oral before breakfast  polyethylene glycol 3350 17 Gram(s) Oral daily  predniSONE   Tablet 40 milliGRAM(s) Oral daily  risperiDONE   Tablet 2 milliGRAM(s) Oral daily  sevelamer carbonate 800 milliGRAM(s) Oral three times a day with meals  simvastatin 20 milliGRAM(s) Oral at bedtime  tamsulosin 0.4 milliGRAM(s) Oral at bedtime    MEDICATIONS  (PRN):  acetaminophen     Tablet .. 650 milliGRAM(s) Oral every 6 hours PRN Temp greater or equal to 38C (100.4F), Mild Pain (1 - 3)  ALBUTerol    90 MICROgram(s) HFA Inhaler 2 Puff(s) Inhalation every 6 hours PRN Shortness of Breath and/or Wheezing  magnesium hydroxide Suspension 30 milliLiter(s) Oral at bedtime PRN Constipation          LABS:                        9.5    8.36  )-----------( 268      ( 18 Feb 2022 07:00 )             30.1     02-18    145  |  106  |  63<H>  ----------------------------<  98  5.0   |  36<H>  |  3.87<H>    Ca    10.1      18 Feb 2022 07:00            Urine studies    PTH and Vit D:

## 2022-02-18 NOTE — DISCHARGE NOTE NURSING/CASE MANAGEMENT/SOCIAL WORK - PATIENT PORTAL LINK FT
You can access the FollowMyHealth Patient Portal offered by [facility] by registering at the following website: [email or web address]. By joining [email or web address]s FollowMyHealth portal, you will also be able to view your health information using other applications (apps) compatible with our system.

## 2022-02-18 NOTE — PROGRESS NOTE ADULT - PROVIDER SPECIALTY LIST ADULT
Cardiology
Cardiology
Internal Medicine
Nephrology
Cardiology
Internal Medicine
Pulmonology
Nephrology
Internal Medicine
Internal Medicine

## 2022-02-18 NOTE — PROGRESS NOTE ADULT - REASON FOR ADMISSION
Shortness of breath / CHF

## 2022-02-18 NOTE — DISCHARGE NOTE NURSING/CASE MANAGEMENT/SOCIAL WORK - NSDCPEFALRISK_GEN_ALL_CORE
For information on Fall & Injury Prevention, visit: [email or web address] OR  [email or web address] OR  https://www.cdc.gov/steadi/patient.html

## 2022-02-19 LAB
CULTURE RESULTS: SIGNIFICANT CHANGE UP
CULTURE RESULTS: SIGNIFICANT CHANGE UP
SPECIMEN SOURCE: SIGNIFICANT CHANGE UP
SPECIMEN SOURCE: SIGNIFICANT CHANGE UP

## 2022-03-29 NOTE — DISCHARGE NOTE PROVIDER - NSDCDCMDCOMP_GEN_ALL_CORE
Report given to Orin Young RN including SBAR, ED Summary, [facility] and Recent Results. This document is complete and the patient is ready for discharge.

## 2022-07-08 NOTE — PROGRESS NOTE ADULT - PROBLEM SELECTOR PLAN 3
Plan as above
Plan as above
- Patient with known ambulatory dysfunction, walks with walker  - Most likely worsening lymphedema and morbid obesity   - CT head unremarkable for acute process   - Doppler LE -ve for DVT  - Nutritionist consulted  - Fall precautions   - PT consulted
Plan as above
- Patient with known ambulatory dysfunction, walks with walker  - Most likely worsening lymphedema and morbid obesity   - CT head unremarkable for acute process   - Doppler LE -ve for DVT  - Nutritionist consulted  - Fall precautions   - PT consulted
Detail Level: Zone
Plan as above
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  Try products in the fold of elbow for three days to test product
CKD stage 4  Baseline creatinine 3.1  Daily CMP while in the hospital  Renal following.  Avoid nephrotoxins.  Continue sevelamer

## 2022-07-17 NOTE — ED PROVIDER NOTE - CARE PLAN
Principal Discharge DX:	Worried well   1 Principal Discharge DX:	Acute respiratory failure with hypercapnia

## 2022-07-17 NOTE — CONSULT NOTE ADULT - NSCONSULTADDITIONALINFOA_GEN_ALL_CORE
Agree with note as outlined by JODEE Clayton above. I examined pt in ICU. Admitted for respiratory failure, currently intubated, sedated though is rousable. With assistance pt was rolled onto left side to examine rectal area. Dressing removed. Could see where stitch placed by ED to control bleeding with overlying surgicel which was removed, this resulted in some oozing. Located at right posterolateral gluteal area. There was a cavity underlying the suture containing clot. Surrounding bruising to area with ridge of skin.  No evidence of purulence. Pressure dressing applied to wound. No evidence of anal mass. Pt desaturated with turning and unable to perform a more thorough exam.  Doubt hematoma due to infection, may be perianal bleed due to prolonged immobility. Continue critical care management of respiratory failure. Once pt improves can consider exam under anesthesia.

## 2022-07-17 NOTE — CONSULT NOTE ADULT - ASSESSMENT
perianal skin lesion, does not appear infectious, no fluctuance, nonfungating, hyperkeratotic skin lesion. periractal area without active bleed. pressure dressing left in place.  trend cbc  GI consult  keep pressure to wound  observation

## 2022-07-17 NOTE — CONSULT NOTE ADULT - SUBJECTIVE AND OBJECTIVE BOX
61 yo mald pmhx anemia, bph, mood disorder, to ER c/o sob intubated and admitted to icu for respiratory failure. Called for report of perianal bleeding from punctate site, unexplained. no hx trauma. Reportedly bleeding was brisk and projectile, however with suturing by ER staff and pressure and surgi-lula wound reportedly slowed to slow ooze. In ER pt intubated and sedated and ICU team indicated that pt hemodynamically stable and better to evaluate in ICYU after transfer.   Upon arrival to ICU examined pt with assistance of icu staff, right perianal area there is a 4cm x 1cm area of hyperkeratotic skin fold, non fluctuant and non erythematous. lateral to this skin lesion punctate area of dried blood. no active bleed. dried scab evident. no fluctuance, no hematoma.  no rectal mass evident. no active bleed from anal/ractal area evident. no blood on glove  no crepitus. unable to assess tenderness but no sign of abcess or cellulitis  Abd: obese, soft nt nd                        8.5    12.76 )-----------( 319      ( 17 Jul 2022 08:21 )             28.3   07-17    138  |  99  |  84<H>  ----------------------------<  163<H>  5.0   |  28  |  6.03<H>    Ca    9.7      17 Jul 2022 08:21    TPro  7.5  /  Alb  2.5<L>  /  TBili  0.1<L>  /  DBili  x   /  AST  14  /  ALT  11  /  AlkPhos  112  07-17

## 2022-07-17 NOTE — PATIENT PROFILE ADULT - FALL HARM RISK - HARM RISK INTERVENTIONS
Assistance with ambulation/Assistance OOB with selected safe patient handling equipment/Communicate Risk of Fall with Harm to all staff/Discuss with provider need for PT consult/Monitor gait and stability/Provide patient with walking aids - walker, cane, crutches/Reinforce activity limits and safety measures with patient and family/Sit up slowly, dangle for a short time, stand at bedside before walking/Tailored Fall Risk Interventions/Use of alarms - bed, chair and/or voice tab/Visual Cue: Yellow wristband and red socks/Bed in lowest position, wheels locked, appropriate side rails in place/Call bell, personal items and telephone in reach/Instruct patient to call for assistance before getting out of bed or chair/Non-slip footwear when patient is out of bed/[place] to call system/Physically safe environment - no spills, clutter or unnecessary equipment/Purposeful Proactive Rounding/Room/bathroom lighting operational, light cord in reach

## 2022-07-17 NOTE — ED ADULT NURSE REASSESSMENT NOTE - NS ED NURSE REASSESS COMMENT FT1
Around 6:50 am, pt is unresponsive to verbal and tactile stimuli. V/S check, BP - 82/53 P - 70 O2 - 87% via NC. MD Underwood made aware. Around 6:50 am, pt is unresponsive to verbal and tactile stimuli. V/S check, BP - 82/53 P - 70 O2 - 87% via NC. Pt was placed on Non rebreather @15lpm. MD Underwood made aware.

## 2022-07-17 NOTE — H&P ADULT - NSICDXPASTMEDICALHX_GEN_ALL_CORE_FT
PAST MEDICAL HISTORY:  Ambulatory dysfunction     Anemia     Anemia     Bipolar disorder     BPH (benign prostatic hyperplasia)     Chronic obstructive pulmonary disease (COPD)     CKD (chronic kidney disease)     History of BPH     HLD (hyperlipidemia)     Hyperlipidemia     Hypothyroid     Mood disorder

## 2022-07-17 NOTE — ED PROVIDER NOTE - PATIENT PORTAL LINK FT
You can access the FollowMyHealth Patient Portal offered by [facility] by registering at the following website: [email or web address]. By joining [facility]’s FollowMyHealth portal, you will also be able to view your health information using other applications (apps) compatible with our system.

## 2022-07-17 NOTE — ED ADULT TRIAGE NOTE - CHIEF COMPLAINT QUOTE
lennox from [place] assisted [facility] with c/o became sweaty and shaking after taking new meds of zocor today as per ems BGL on scene was 150

## 2022-07-17 NOTE — ED ADULT NURSE NOTE - CHIEF COMPLAINT QUOTE
lennox from [place] assisted [facility] with c/o became sweaty and shaking after taking new meds of zocor today as per ems BGL on scene was 150

## 2022-07-17 NOTE — CONSULT NOTE ADULT - SUBJECTIVE AND OBJECTIVE BOX
Time of visit:    CHIEF COMPLAINT: Patient is a 62y old  Male who presents with a chief complaint of     HPI:  Patient is AAOx0, intubated; So hx was obtained from previous charts and NH paperwork    Patient 62 years old Morbid obese male from [facility] ambulates with walker has PMHx of bph, ckd, and bipolar disorder, COPD not on O2 at home, hypothyroidism, anemia, left DVT, CKD, BPH, HLD, bipolar disorder, ambulatory dysfunction, lymphedema presented with shortness of breath and unresponsivness. The patient was sent here due to sweaty and shaky reaction to taking new Zocor pill. Patient was intubated in ED and ICU was called for Acute hypoxia respiratory failure likely secondary to COPD exacerbation.  (2022 11:15)   Patient seen and examined.     PAST MEDICAL & SURGICAL HISTORY:  Hypothyroid      Hyperlipidemia      Ambulatory dysfunction      Anemia      History of BPH      CKD (chronic kidney disease)      Chronic obstructive pulmonary disease (COPD)      Bipolar disorder      Mood disorder      HLD (hyperlipidemia)      BPH (benign prostatic hyperplasia)      Anemia      No significant past surgical history          Allergies    No Known Allergies    Intolerances        MEDICATIONS  (STANDING):  buPROPion XL (24-Hour) . 150 milliGRAM(s) Oral daily  calcitriol   Capsule 0.25 MICROGram(s) Oral daily  cefepime   IVPB      cefepime   IVPB 1000 milliGRAM(s) IV Intermittent every 12 hours  chlorhexidine 0.12% Liquid 15 milliLiter(s) Oral Mucosa every 12 hours  dexMEDEtomidine Infusion 0.5 MICROgram(s)/kG/Hr (18.1 mL/Hr) IV Continuous <Continuous>  diVALproex  milliGRAM(s) Oral at bedtime  ferrous    sulfate 325 milliGRAM(s) Oral daily  folic acid 1 milliGRAM(s) Oral daily  levothyroxine 125 MICROGram(s) Oral daily  norepinephrine Infusion 0.4 MICROgram(s)/kG/Min (109 mL/Hr) IV Continuous <Continuous>  pantoprazole    Tablet 40 milliGRAM(s) Oral before breakfast  risperiDONE   Tablet 2 milliGRAM(s) Oral daily  sevelamer carbonate 800 milliGRAM(s) Oral every 8 hours  tamsulosin 0.4 milliGRAM(s) Oral at bedtime      MEDICATIONS  (PRN):  ALBUTerol    90 MICROgram(s) HFA Inhaler 2 Puff(s) Inhalation every 6 hours PRN Shortness of Breath and/or Wheezing   Medications up to date at time of exam.    Medications up to date at time of exam.    FAMILY HISTORY:      SOCIAL HISTORY  Smoking History: [   ] smoking/smoke exposure, [ x  ] former smoker  PPD   Living Condition: [   ] apartment, [   ] private house  Work History:  retired   Travel History: denies recent travel  Illicit Substance Use: denies  Alcohol Use: denies    REVIEW OF SYSTEMS:    CONSTITUTIONAL:  denies fevers, chills, sweats, weight loss    HEENT:  denies diplopia or blurred vision, sore throat or runny nose.    CARDIOVASCULAR:  denies pressure, squeezing, tightness, or heaviness about the chest; no palpitations.    RESPIRATORY:  denies SOB, cough, PAULA, wheezing.    GASTROINTESTINAL:  denies abdominal pain, nausea, vomiting or diarrhea.    GENITOURINARY: denies dysuria, frequency or urgency.    NEUROLOGIC:  denies numbness, tingling, seizures or weakness.    PSYCHIATRIC:  denies disorder of thought or mood.    MSK: denies swelling, redness      PHYSICAL EXAMINATION:    GENERAL: The patient is a well-developed, well-nourished, in no apparent distress.     Vital Signs Last 24 Hrs  T(C): 36.1 (2022 14:00), Max: 36.9 (2022 02:28)  T(F): 97 (2022 14:00), Max: 98.5 (2022 02:28)  HR: 55 (2022 16:38) (53 - 108)  BP: 117/59 (2022 15:30) (82/53 - 136/72)  BP(mean): 73 (2022 15:30) (55 - 82)  RR: 20 (2022 15:45) (15 - 23)  SpO2: 100% (2022 16:38) (87% - 100%)    Parameters below as of 2022 15:45  Patient On (Oxygen Delivery Method): ventilator    O2 Concentration (%): 60  Mode: AC/ CMV (Assist Control/ Continuous Mandatory Ventilation)  RR (machine): 20  TV (machine): 450  FiO2: 50  PEEP: 5  ITime: 0.8  MAP: 9  PIP: 22   (if applicable)    Chest Tube (if applicable)    HEENT: Head is normocephalic and atraumatic. Extraocular muscles are intact. Mucous membranes are moist.     NECK: Supple, no palpable adenopathy.    LUNGS: Clear to auscultation, no wheezing, rales, or rhonchi.    HEART: Regular rate and rhythm without murmur.    ABDOMEN: Soft, nontender, and nondistended.  No hepatosplenomegaly is noted.    RENAL: No difficulty voiding, no pelvic pain    EXTREMITIES: Without any cyanosis, clubbing, rash, lesions or edema.    NEUROLOGIC: Awake, alert, oriented, grossly intact    SKIN: Warm, dry, good turgor.      LABS:                        8.5    12.76 )-----------( 319      ( 2022 08:21 )             28.3         138  |  99  |  84<H>  ----------------------------<  163<H>  5.0   |  28  |  6.03<H>    Ca    9.7      2022 08:21    TPro  7.5  /  Alb  2.5<L>  /  TBili  0.1<L>  /  DBili  x   /  AST  14  /  ALT  11  /  AlkPhos  112      PT/INR - ( 2022 08:54 )   PT: 11.1 sec;   INR: 0.93 ratio         PTT - ( 2022 08:54 )  PTT:28.6 sec  Urinalysis Basic - ( 2022 09:05 )    Color: Yellow / Appearance: Clear / S.020 / pH: x  Gluc: x / Ketone: Negative  / Bili: Negative / Urobili: Negative   Blood: x / Protein: 100 / Nitrite: Negative   Leuk Esterase: Trace / RBC: 5-10 /HPF / WBC 6-10 /HPF   Sq Epi: x / Non Sq Epi: Few /HPF / Bacteria: Few /HPF      ABG - ( 2022 12:56 )  pH, Arterial: 7.30  pH, Blood: x     /  pCO2: 49    /  pO2: 143   / HCO3: 24    / Base Excess: -2.8  /  SaO2: 99                        Lactate, Blood: 1.2 mmol/L (22 @ 08:21)        MICROBIOLOGY: (if applicable)    RADIOLOGY & ADDITIONAL STUDIES:  EKG:   CT chest:< from: CT Chest No Cont (22 @ 09:51) >    ACC: [id number] EXAM:  CT ABDOMEN AND PELVIS                        ACC: [id number] EXAM:  CT CHEST                          PROCEDURE DATE:  2022          INTERPRETATION:  CLINICAL INFORMATION: Altered mental status. Acute   hypoxic respiratory failure, intubated. Perianal subcutaneous bleeding,   concern for collection.    COMPARISON: CT chest 2022    CONTRAST/COMPLICATIONS:  IV Contrast: NONE  Oral Contrast: NONE  Complications: None reported at time of study completion    PROCEDURE:  CT of the Chest, Abdomen and Pelvis was performed.  Sagittal and coronal reformats were performed.    FINDINGS:  Limited evaluation of the vasculature and viscera in the absence of   intravenous contrast.    CHEST:  LUNGS AND LARGE AIRWAYS: Motion degraded. Endotracheal tube with tip   above the lesley. Likely mucous plugging in the bilateral lower lobe   airways. Near complete atelectasis of the right lower lobe. Segmental   atelectasis within the left lower lobe. Subsegmental atelectasis within  the right upper lobe, middle lobe, and left upper lobe. Mild biapical   paraseptal emphysema.  PLEURA: No pleural effusion.  VESSELS: Atherosclerotic changes. Coronary artery calcifications.  HEART: Mild cardiomegaly. No pericardial effusion. Aortic valve   calcification.  MEDIASTINUM AND CARLOS: No lymphadenopathy.  CHEST WALL AND LOWER NECK: Heterogeneous thyroid gland with coarse   calcifications.    ABDOMEN AND PELVIS:  LIVER: Within normal limits.  BILE DUCTS: Normal caliber.  GALLBLADDER: Cholecystectomy.  SPLEEN: Within normal limits.  PANCREAS: Within normal limits.  ADRENALS: Within normal limits.  KIDNEYS/URETERS: Bilateral nonspecific perinephric stranding. No   hydronephrosis. No renal calculi.    BLADDER: Underdistended with Viera catheter in place and expected   intravesicular air.  REPRODUCTIVE ORGANS: The prostate is not enlarged.    BOWEL: Moderate colonic stool burden. The rectum is distended with stool.   No evidence of bowel obstruction. Colonic diverticula without evidence of   acute diverticulitis. Appendix is normal.  PERITONEUM: No ascites.  VESSELS: Atherosclerotic changes. IVC filter.  RETROPERITONEUM/LYMPH NODES: Nonspecific small left para-aortic and left   common iliac lymph nodes, with example measuring 1.8 x 1.6 cm (series 2   image 219).  ABDOMINAL WALL: Small bilateral fat-containing inguinal hernias.   Incompletely imaged focal cutaneous/subcutaneous thickening along the   right inferior intergluteal cleft extending into the right perineum and   towards the right perianal soft tissue, the imaged portion measuring 4.4   x 3.4 x 2.2 cm (series 2 image 328, series 8 image 127). The imaged   portion demonstrate soft tissue density without discrete fluid collection   identified.  BONES: Degenerative changes, with severe osteoarthritic changes of the   right hip and remodeling of the right femoral head.    IMPRESSION:  Near complete atelectasis of the right lower lobe and segmental   atelectasis of the left lower lobe with likely mucous pluggingof the   bilateral lower lobe airways. Underlying pneumonia is not excluded.    Incompletely imaged cutaneous/subcutaneous thickening along the right   inferior gluteal cleft and extending into the right perineum and towards   the right perianal softtissue, which is of uncertain etiology. The   imaged portion measures 4.4 x 3.4 x 2.2 cm. No discrete fluid collection   is visualized.    Moderate colonic stool burden with mild distention of the rectum, likely   reflecting constipation. No evidenceof bowel obstruction.        --- End of Report ---            AISSATOU VERGARA MD; Attending Radiologist  This document has been electronically signed. 2022  2:21PM    < end of copied text >    ECHO:    IMPRESSION: 62y Male PAST MEDICAL & SURGICAL HISTORY:  Hypothyroid      Hyperlipidemia      Ambulatory dysfunction      Anemia      History of BPH      CKD (chronic kidney disease)      Chronic obstructive pulmonary disease (COPD)      Bipolar disorder      Mood disorder      HLD (hyperlipidemia)      BPH (benign prostatic hyperplasia)      Anemia      No significant past surgical history       p/w               IMP: This a 62 years old man former smoker with Morbid obese class 3  from [facility] ambulates with  COPD , TEJ on BiPaP 18/ , hypothyroidism, anemia, left DVT, CKD, BPH, HLD, bipolar disorder, ambulatory dysfunction, lymphedema presented with shortness of breath and unresponsiveness  was intubated for acute hypoxic hypercapnic resp failure due to acute ex of COPD .  CT chest show b/l lower lung atelectasis with possible PNA     Sugg     - Continue vent support   - Adjust vent as per ABG   - Sedation for vent synchrony   - Continue iv antibx   - F/U  cultures   - D/C albuterol inhaler prn   - Start Combivent q6h   - Solumedrol 40 mg q12h   - DVT GI prophy     discussed with ICU team

## 2022-07-17 NOTE — H&P ADULT - ATTENDING COMMENTS
62 years old Morbid obese male from [facility] ambulates with walker has PMHx of COPD not on O2 at home, hypothyroidism, anemia, left DVT, CKD, BPH, HLD, bipolar disorder, ambulatory dysfunction, lymphedema presented with shortness of breath and unresponsiveness The patient was sent here due to sweaty and shaky reaction to taking new Zocor pill. Patient was intubated in ED and ICU was called for Acute hypoxia and hypercapnic respiratory failure likely secondary to COPD exacerbation or other etiology?    Assessment:  1. Acute hypoxic and hypercapnic respiratory failure   2.   #Acute Hypoxia respiratory failure   #TEJ  #SHEYLA on CKD  # Prianal Subcutanous tissue bleeding 62 years old Morbid obese male from [facility] ambulates with walker has PMHx of COPD not on O2 at home, hypothyroidism, anemia, left DVT, CKD, BPH, HLD, bipolar disorder, ambulatory dysfunction, lymphedema presented with shortness of breath and unresponsiveness The patient was sent here due to sweaty and shaky reaction to taking new Zocor pill. Patient was intubated in ED and ICU was called for Acute hypoxia and hypercapnic respiratory failure. As per triage note patient endorsed shortness of breath.     Assessment:  1. Acute hypoxic and hypercapnic respiratory failure   2. Severe sepsis  3. Aspiration pneumonia   4. COPD   5. Acute encephalopathy  6. Perianal soft tissue bleeding  7. Bipolar disorder  8. Chronic venous statis changes of lower extremities   9. Acute renal failure on CKD   10. Hyperlipidemia   11. BPH   12. Class 3 obesity    Plan:  - Cont. mechanical ventilation  - ABG improved post intubation  - CT head noted with out acute pathology   - Empiric antibiotics  - Follow up cultures, including sputum culture  - Check viral panel   - S/p IV fluid resuscitation in the ED  - Cont. home psych meds  - Check valproic acid level  - restart bronchodilators  - Hypotensive post intubation, requiring low dose vasopressors  - Surgery evaluation of bleeding wound, s/p surgicel placement and suturing in the ED  - Wound care consult  - Pelvic CT scan to evaluate for possible perianal collection  - Check lower extremity dopplers  - Viera to monitor urine output  - Repeat labs in PM   - Admit to ICU for further management

## 2022-07-17 NOTE — ED ADULT NURSE REASSESSMENT NOTE - NS ED NURSE REASSESS COMMENT FT1
Patient intubated by Dr. Underwood with ET size 7.5 at lip line 24. Etomidate 20mg administered as ordered. Viera catheter fr.16 inserted aseptically without any difficulty. Awaiting fo ICU Consultation.

## 2022-07-17 NOTE — H&P ADULT - HISTORY OF PRESENT ILLNESS
62 year old male with major PMHx of bph, ckd, and bipolar disorder is a resident and [facility]. The patient was sent here due to sweaty and shaky reaction to taking new zocor pill today. Patient is AAOx0, intubated; So hx was obtained from previous charts and NH paperwork    Patient 62 years old Morbid obese male from [facility] ambulates with walker has PMHx of bph, ckd, and bipolar disorder, COPD not on O2 at home, hypothyroidism, anemia, left DVT, CKD, BPH, HLD, bipolar disorder, ambulatory dysfunction, lymphedema presented with shortness of breath and unresponsivness. The patient was sent here due to sweaty and shaky reaction to taking new Zocor pill. Patient was intubated in ED and ICU was called for Acute hypoxia respiratory failure likely secondary to COPD exacerbation.

## 2022-07-17 NOTE — H&P ADULT - NSHPPHYSICALEXAM_GEN_ALL_CORE
ICU Vital Signs Last 24 Hrs  T(C): 35.7 (17 Jul 2022 11:45), Max: 36.9 (17 Jul 2022 02:28)  T(F): 96.2 (17 Jul 2022 11:45), Max: 98.5 (17 Jul 2022 02:28)  HR: 60 (17 Jul 2022 11:30) (60 - 108)  BP: 121/62 (17 Jul 2022 11:30) (82/53 - 136/72)  BP(mean): 75 (17 Jul 2022 11:30) (55 - 76)  RR: 20 (17 Jul 2022 11:30) (15 - 23)  SpO2: 100% (17 Jul 2022 11:30) (87% - 100%)    O2 Parameters below as of 17 Jul 2022 10:20  Patient On (Oxygen Delivery Method): ventilator    O2 Concentration (%): 100      GENERAL: Patient is geri  HEAD:  Atraumatic, Normocephalic  EYES: EOMI, PERRLA, conjunctiva and sclera clear  ENT: Moist mucous membranes  NECK: Supple, No JVD  CHEST/LUNG: Clear to auscultation bilaterally; No rales, rhonchi, wheezing, or rubs. Unlabored respirations  HEART: Regular rate and rhythm; No murmurs, rubs, or gallops  ABDOMEN: Bowel sounds present; Soft, Nontender, Nondistended. No hepatomegally  EXTREMITIES:  2+ Peripheral Pulses, brisk capillary refill. No clubbing, cyanosis, or edema  NERVOUS SYSTEM:  Alert & Oriented X3, speech clear. No deficits   MSK: FROM all 4 extremities, full and equal strength  SKIN: No rashes or lesions ICU Vital Signs Last 24 Hrs  T(C): 35.7 (17 Jul 2022 11:45), Max: 36.9 (17 Jul 2022 02:28)  T(F): 96.2 (17 Jul 2022 11:45), Max: 98.5 (17 Jul 2022 02:28)  HR: 60 (17 Jul 2022 11:30) (60 - 108)  BP: 121/62 (17 Jul 2022 11:30) (82/53 - 136/72)  BP(mean): 75 (17 Jul 2022 11:30) (55 - 76)  RR: 20 (17 Jul 2022 11:30) (15 - 23)  SpO2: 100% (17 Jul 2022 11:30) (87% - 100%)    O2 Parameters below as of 17 Jul 2022 10:20  Patient On (Oxygen Delivery Method): ventilator    O2 Concentration (%): 100      GENERAL: Patient is sedated and intubated, AAOx0  HEAD:  Atraumatic, Normocephalic  EYES: EOMI, PERRLA, conjunctiva and sclera clear  ENT: Moist mucous membranes  NECK: Supple, No JVD  CHEST/LUNG: Diminished breath sound bilateral lower lobes  HEART: Regular rate and rhythm; No murmurs, rubs, or gallops  ABDOMEN: Bowel sounds present; Soft, Nontender, Nondistended. No hepatomegally  EXTREMITIES:  2+ Peripheral Pulses, brisk capillary refill. No clubbing, cyanosis, or edema  NERVOUS SYSTEM:  Alert & Oriented X0, intubated.  MSK: Unable to assess   SKIN: No rashes or lesions ICU Vital Signs Last 24 Hrs  T(C): 35.7 (17 Jul 2022 11:45), Max: 36.9 (17 Jul 2022 02:28)  T(F): 96.2 (17 Jul 2022 11:45), Max: 98.5 (17 Jul 2022 02:28)  HR: 60 (17 Jul 2022 11:30) (60 - 108)  BP: 121/62 (17 Jul 2022 11:30) (82/53 - 136/72)  BP(mean): 75 (17 Jul 2022 11:30) (55 - 76)  RR: 20 (17 Jul 2022 11:30) (15 - 23)  SpO2: 100% (17 Jul 2022 11:30) (87% - 100%)    O2 Parameters below as of 17 Jul 2022 10:20  Patient On (Oxygen Delivery Method): ventilator    O2 Concentration (%): 100      GENERAL: Patient is sedated and intubated, AAOx0  HEAD:  Atraumatic, Normocephalic  EYES: EOMI, PERRLA, conjunctiva and sclera clear  ENT: Moist mucous membranes  NECK: Supple, No JVD  CHEST/LUNG: Diminished breath sound bilateral lower lobes  HEART: Regular rate and rhythm; No murmurs, rubs, or gallops  ABDOMEN: Bowel sounds present; Soft, Nontender, Nondistended. No hepatomegally  EXTREMITIES:  2+ Peripheral Pulses, brisk capillary refill. No clubbing, cyanosis, or edema  NERVOUS SYSTEM:  Alert & Oriented X0, intubated.  MSK: Unable to assess   SKIN: Thickened skin in the perianal region, with + bleeding wound, bilateral venous stasis changes

## 2022-07-17 NOTE — ED ADULT NURSE REASSESSMENT NOTE - NS ED NURSE REASSESS COMMENT FT1
Report given to SHRUTHI Solis. Noted with open wound at right inner buttocks and skin discoloration at mid-sternum area. Noted bilateral lower leg dryness. Wound sutured by Dr. Parson.

## 2022-07-17 NOTE — ED PROVIDER NOTE - PHYSICAL EXAMINATION
Patient is asleep. Waking and answering questions and falling back asleep (may be consistent with morbid obesity and sleep apnea)

## 2022-07-17 NOTE — ED PROVIDER NOTE - PROGRESS NOTE DETAILS
Patient waiting for [facility] to bring him back to nursing home, schedueld for noon, sleeping throughout the night.  Sonorous but responsive.  At 7 pm nurse informed me he was unresponsive, with a pulse but no gag relex, likely CO retaining, will do trail of CPAP DERICK: Patient intubated by Dr. Underwood at 7:30am, signed out pending ICU consult. ICU team at bedside, accepted under Dr. Cohen.

## 2022-07-17 NOTE — ED PROVIDER NOTE - NSICDXPASTMEDICALHX_GEN_ALL_CORE_FT
PAST MEDICAL HISTORY:  Ambulatory dysfunction     Anemia     Bipolar disorder     Chronic obstructive pulmonary disease (COPD)     CKD (chronic kidney disease)     History of BPH     Hyperlipidemia     Hypothyroid       Anemia     BPH (benign prostatic hyperplasia)     HLD (hyperlipidemia)     Mood disorder

## 2022-07-17 NOTE — ED ADULT NURSE NOTE - OBJECTIVE STATEMENT
pt lennox from [place] assisted [facility]. As per EMS, pt c/o became sweaty and shaking after taking new meds of zocor today. PT sleeping, responsive to verbal and tactile stimuli.

## 2022-07-17 NOTE — H&P ADULT - ASSESSMENT
Patient 62 years old Morbid obese male from [facility] ambulates with walker has PMHx of bph, ckd, and bipolar disorder, COPD not on O2 at home, hypothyroidism, anemia, left DVT, CKD, BPH, HLD, bipolar disorder, ambulatory dysfunction, lymphedema presented with shortness of breath and unresponsiveness The patient was sent here due to sweaty and shaky reaction to taking new Zocor pill. Patient was intubated in ED and ICU was called for Acute hypoxia respiratory failure likely secondary to COPD exacerbation or other etiology?    #Acute Hypoxia respiratory failure   #TEJ  #SHEYLA on CKD  # Prianal Subcutanous tissue bleeding    Patient 62 years old Morbid obese male from [facility] ambulates with walker has PMHx of bph, ckd, and bipolar disorder, COPD not on O2 at home, hypothyroidism, anemia, left DVT, CKD, BPH, HLD, bipolar disorder, ambulatory dysfunction, lymphedema presented with shortness of breath and unresponsiveness The patient was sent here due to sweaty and shaky reaction to taking new Zocor pill. Patient was intubated in ED and ICU was called for Acute hypoxia respiratory failure likely secondary to COPD exacerbation or other etiology?    #Acute Hypoxia respiratory failure   #TEJ  #SHEYLA on CKD  # Prianal Subcutanous tissue bleeding       ====Neuro====    Patient is AAOx0, Sedated and intubated in ED, Baseline AAox2-3  In ED, Patient was unresponsiveness likely due to Hypercapnia from COPD exacerbation      #Seizure?  Will need to r/o Seizure, Patient was shaking in ED and Got Ativan 2mg   Will get EEG   Ativan PRN 2mg       ====CVS====  #Hypotension   Likely due to sedations   Now BP improving   Echo on Feb; Was normal including EF    ====Pulm====  #Acute hypoxia respiratory failure   Likely due to COPD   ABG showed CO2 >125  Patient was intubated; Will repeat ABG  Will continue home medications includes inhalars  CXR showed Atelectasis of bilateral lungs   Serial CXR   f/u Sputum culture   f/u RVP       ====GI====  #NPO on tube feed     ====Renal====  #NAKI on CKD   Baseline ~5  Will send U lytes and start on IV fluids     ====ID====  #No active issues    ====Heme/Onc====    #Patient has prianal bleeding from subcutanous tissue   Pressure was applied and surgery was consulted   pending recommendations     ====Endo====  #bG goal: 140-180    ====Skin/lines====  # As before     ====Ppx====  #DVT: SCD

## 2022-07-17 NOTE — ED PROVIDER NOTE - OBJECTIVE STATEMENT
62 year old male with major PMHx of bph, ckd, and bipolar disorder is a resident and [facility]. The patient was sent here due to sweaty and shaky reaction to taking new zocor pill today. Patient also complains of shortness of breath for "a very long time." NKDA.

## 2022-07-17 NOTE — PROCEDURE NOTE - ADDITIONAL PROCEDURE DETAILS
18g 8cm  extended dwell IV (Arrow) placed with sterile technique under ultrasound guidance on 7/17. Confirmed via ultrasound and VBG. Can be used for up to 30 days. Above details and extended dwell policy reviewed in full with primary team NP/Resident and RN.

## 2022-07-18 NOTE — ADVANCED PRACTICE NURSE CONSULT - RECOMMEDATIONS
-Apply a Foam dressing to the Sacral area Q 72hrs PRN, for protection  -Elevate/float the patients heels using heel protectors and reposition the patient Q 2hrs using wedges or pillows

## 2022-07-18 NOTE — AIRWAY REMOVAL NOTE  ADULT & PEDS - ARTIFICAL AIRWAY REMOVAL COMMENTS
@11:12 am patient extubated and placed on BIPAP ST 16/8/40% as per DR. BURNETTE, will continue to monitor ventilation and oxygenation status.

## 2022-07-18 NOTE — PROGRESS NOTE ADULT - ASSESSMENT
Patient 62 years old Morbid obese male from [facility] ambulates with walker has PMHx of bph, ckd, and bipolar disorder, COPD not on O2 at home, hypothyroidism, anemia, left DVT, CKD, BPH, HLD, bipolar disorder, ambulatory dysfunction, lymphedema presented with shortness of breath and unresponsiveness The patient was sent here due to sweaty and shaky reaction to taking new Zocor pill. Patient was intubated in ED and ICU was called for Acute hypoxia respiratory failure likely secondary to COPD exacerbation or other etiology?    #Acute Hypoxia respiratory failure   #TEJ  #SHEYLA on CKD  # Prianal Subcutanous tissue bleeding       ====Neuro====    Patient is AAOx0, Sedated and intubated in ED, Baseline AAox2-3  In ED, Patient was unresponsiveness likely due to Hypercapnia from COPD exacerbation      #Seizure?  Will need to r/o Seizure, Patient was shaking in ED and Got Ativan 2mg   Will get EEG   Ativan PRN 2mg       ====CVS====  #Hypotension   Likely due to sedations   Now BP improving   Echo on Feb; Was normal including EF    ====Pulm====  #Acute hypoxia respiratory failure   Likely due to COPD   ABG showed CO2 >125  Patient was intubated; Will repeat ABG  Will continue home medications includes inhalars  CXR showed Atelectasis of bilateral lungs   Serial CXR   f/u Sputum culture   f/u RVP       ====GI====  #NPO on tube feed     ====Renal====  #NAKI on CKD   Baseline ~5  Will send U lytes and start on IV fluids     ====ID====  #No active issues    ====Heme/Onc====    #Patient has prianal bleeding from subcutanous tissue   Pressure was applied and surgery was consulted   pending recommendations     ====Endo====  #bG goal: 140-180    ====Skin/lines====  # As before     ====Ppx====  #DVT: SCD      Assessment & Plan  62 years old from [facility], history of morbid obesity, bph, ckd, and bipolar disorder, hypothyroidism lymphedema, COPD, and TEJ on QHS CPAP presented to  on 7/17 for an episode of "shaking a sweating" after taking Zocor, while awaiting disposition became unresponsive secondary to acute hypercapnic respiratory failure requiring intubation, also noted to have perianal bleeding.     Active Issues:  #Acute Hypoxia Hypercapnic respiratory failure   #TEJ  #SHEYLA on CKD  # Prianal Subcutanous tissue bleeding   # COPD    ====Neuro====  #Acute Metabolic Encephalopathy   - Likely secondary to hypercapnia.   - Baseline of A+Ox2 - 3 resides at assisted living.   - Off all sedating medications.   - Currently at baseline mentation now that hypercapnia has resolved.     #Rule Out: Seizure  - Had episode of generalized shaking in ED and received Ativan.   - Continue home dose of Divalproex 500mg BID.     ====CVS====  #Hypotension   - Likely due to sedation, was on low dose Levophed.   - Wean off, start Midodrine if needed.   - TTE in February without acute findings.     ====Pulm====  #Acute hypoxia hypercapnic respiratory failure   - Likely due to TEJ/COPD and non-adherence to CPAP   - Inital ABG showed CO2 >125  - Resolved after intubation/ventilation.   - Tolerated PS trial, extubated 7/18.   - Continue home COPD medications.     ====GI====  #Nutrition  - Diet as tolerated    ====Renal====  #NAKI on CKD   - Non-oliguric, likely pre-renal.   - Monitor for resolution.    ====ID====  #Empiric  - On empiric course of Cefepime due to inital concern for sepsis.   - If no infectious indicators can discontinue.     ====Heme/Onc====  VTE Prophylaxis   - Heparin 5000 q8.   - History of left leg DVT in chart but unable to find positive ultrasound study in EMR.   - Not on full AC at home.     ====Endo====  #bG goal: 140-180    ====Skin/lines====  #Rule Out: Perianal Abcess  - Noted to have perianal skin bleeding in ED requiring sutute.   - CT Abdomen pelvis with skin thickening but no collection.   - Evaluated by surgery Dr. Rojas.

## 2022-07-18 NOTE — PROGRESS NOTE ADULT - SUBJECTIVE AND OBJECTIVE BOX
Time of Visit:  Patient seen and examined.     MEDICATIONS  (STANDING):  albuterol/ipratropium for Nebulization 3 milliLiter(s) Nebulizer every 6 hours  buPROPion XL (24-Hour) . 150 milliGRAM(s) Oral daily  calcitriol   Capsule 0.25 MICROGram(s) Oral daily  cefepime   IVPB      cefepime   IVPB 1000 milliGRAM(s) IV Intermittent every 12 hours  ferrous    sulfate 325 milliGRAM(s) Oral daily  folic acid 1 milliGRAM(s) Oral daily  heparin   Injectable 5000 Unit(s) SubCutaneous every 8 hours  levothyroxine 125 MICROGram(s) Oral daily  mupirocin 2% Ointment 1 Application(s) Both Nostrils two times a day  norepinephrine Infusion 0.4 MICROgram(s)/kG/Min (109 mL/Hr) IV Continuous <Continuous>  pantoprazole  Injectable 40 milliGRAM(s) IV Push daily  risperiDONE   Tablet 2 milliGRAM(s) Oral daily  sevelamer carbonate Powder 800 milliGRAM(s) Enteral Tube three times a day  tamsulosin 0.4 milliGRAM(s) Oral at bedtime  valproic  acid Syrup 150 milliGRAM(s) Oral four times a day      MEDICATIONS  (PRN):  ALBUTerol    90 MICROgram(s) HFA Inhaler 2 Puff(s) Inhalation every 6 hours PRN Shortness of Breath and/or Wheezing       Medications up to date at time of exam.      PHYSICAL EXAMINATION:    Vital Signs Last 24 Hrs  T(C): 36.7 (2022 07:30), Max: 37.9 (2022 00:00)  T(F): 98 (2022 07:30), Max: 100.2 (2022 00:00)  HR: 49 (2022 12:15) (46 - 104)  BP: 79/53 (2022 12:00) (79/40 - 153/50)  BP(mean): 60 (2022 12:00) (49 - 109)  RR: 14 (2022 12:15) (11 - 45)  SpO2: 100% (2022 12:15) (85% - 100%)    Parameters below as of 2022 11:38      O2 Concentration (%): 40  Mode: CPAP with PS  FiO2: 40  PEEP: 5  PS: 8  ITime: 0.8  MAP: 8  PIP: 13   (if applicable)    General: Extubated today and on Bipap.  Morbid Obese.     HEENT: Head is normocephalic and atraumatic. No nasal tenderness . Mucous membranes are moist.     NECK: Supple, no palpable adenopathy.    LUNGS: Non labored. No wheezing. No use of accessory muscle.     HEART: S1 S2 Regular rate and no click/ rub.     ABDOMEN: Soft, nontender, and nondistended.  Active bowel sounds.     : No bladder distention and tenderness.      SKIN: Warm and moist . Non diaphoretic.       LABS:                        8.1    9.08  )-----------( 263      ( 2022 03:53 )             25.2     07-18    139  |  105  |  82<H>  ----------------------------<  139<H>  4.5   |  24  |  5.20<H>    Ca    9.7      2022 03:53  Phos  5.0     07-18  Mg     3.2     07-18    TPro  7.1  /  Alb  2.4<L>  /  TBili  0.2  /  DBili  x   /  AST  23  /  ALT  16  /  AlkPhos  90  07-18    PT/INR - ( 2022 08:54 )   PT: 11.1 sec;   INR: 0.93 ratio         PTT - ( 2022 08:54 )  PTT:28.6 sec  Urinalysis Basic - ( 2022 09:05 )    Color: Yellow / Appearance: Clear / S.020 / pH: x  Gluc: x / Ketone: Negative  / Bili: Negative / Urobili: Negative   Blood: x / Protein: 100 / Nitrite: Negative   Leuk Esterase: Trace / RBC: 5-10 /HPF / WBC 6-10 /HPF   Sq Epi: x / Non Sq Epi: Few /HPF / Bacteria: Few /HPF      ABG - ( 2022 03:22 )  pH, Arterial: 7.35  pH, Blood: x     /  pCO2: 44    /  pO2: 102   / HCO3: 24    / Base Excess: -1.5  /  SaO2: 98                              MICROBIOLOGY: (if applicable)    RADIOLOGY & ADDITIONAL STUDIES:  EKG:   CXR:  ECHO:    IMPRESSION: 62y Male PAST MEDICAL & SURGICAL HISTORY:  Hypothyroid      Hyperlipidemia      Ambulatory dysfunction      Anemia      History of BPH      CKD (chronic kidney disease)      Chronic obstructive pulmonary disease (COPD)      Bipolar disorder      Mood disorder      HLD (hyperlipidemia)      BPH (benign prostatic hyperplasia)      Anemia      No significant past surgical history    Impression: This is a 61 Y/O Male from [facility] with Hx of TEJ on Bipap and COPD. Presented with Shortness of breath and unresponsiveness. Admitted to ICU for Acute hypoxic hypercapnic respiratory failure due to Acute exacerbation of COPD. Got intubated and extubated today and on Bipap.  CT chest show b/l lower lung atelectasis with possible PNA . 22 Negative RVP and Negative for Covid 19.        Suggestion:  Extubated today. On Bipap 16/8 FIO 40%. Monitor O2 saturation trend.   On Duoneb via nebulization Q 6 Hours.   Please Dcd PRN Albuterol 90 mcg Q 6 hours.   On Cefepime 1 Gm IVPB Daily.   DVT/ GI prophylactic. On Heparin 5,000 Units SQ Q8 Hours.

## 2022-07-18 NOTE — PROGRESS NOTE ADULT - SUBJECTIVE AND OBJECTIVE BOX
INTERVAL HPI/OVERNIGHT EVENTS:       PRESSORS: [ ] YES [ ] NO  WHICH:    ANTIBIOTICS:                  DATE STARTED:  ANTIBIOTICS:                  DATE STARTED:    Antimicrobial:  cefepime   IVPB      cefepime   IVPB 1000 milliGRAM(s) IV Intermittent every 12 hours    Cardiovascular:  norepinephrine Infusion 0.4 MICROgram(s)/kG/Min IV Continuous <Continuous>  tamsulosin 0.4 milliGRAM(s) Oral at bedtime    Pulmonary:  ALBUTerol    90 MICROgram(s) HFA Inhaler 2 Puff(s) Inhalation every 6 hours PRN  albuterol/ipratropium for Nebulization 3 milliLiter(s) Nebulizer every 6 hours    Hematalogic:  heparin   Injectable 5000 Unit(s) SubCutaneous every 8 hours    Other:  buPROPion . 75 milliGRAM(s) Oral two times a day  calcitriol   Capsule 0.25 MICROGram(s) Oral daily  ferrous    sulfate Liquid 300 milliGRAM(s) Enteral Tube daily  folic acid 1 milliGRAM(s) Oral daily  levothyroxine 125 MICROGram(s) Oral daily  mupirocin 2% Ointment 1 Application(s) Both Nostrils two times a day  pantoprazole  Injectable 40 milliGRAM(s) IV Push daily  risperiDONE   Tablet 2 milliGRAM(s) Oral daily  sevelamer carbonate Powder 800 milliGRAM(s) Enteral Tube three times a day  valproic  acid Syrup 200 milliGRAM(s) Enteral Tube four times a day    ALBUTerol    90 MICROgram(s) HFA Inhaler 2 Puff(s) Inhalation every 6 hours PRN  albuterol/ipratropium for Nebulization 3 milliLiter(s) Nebulizer every 6 hours  buPROPion . 75 milliGRAM(s) Oral two times a day  calcitriol   Capsule 0.25 MICROGram(s) Oral daily  cefepime   IVPB      cefepime   IVPB 1000 milliGRAM(s) IV Intermittent every 12 hours  ferrous    sulfate Liquid 300 milliGRAM(s) Enteral Tube daily  folic acid 1 milliGRAM(s) Oral daily  heparin   Injectable 5000 Unit(s) SubCutaneous every 8 hours  levothyroxine 125 MICROGram(s) Oral daily  mupirocin 2% Ointment 1 Application(s) Both Nostrils two times a day  norepinephrine Infusion 0.4 MICROgram(s)/kG/Min IV Continuous <Continuous>  pantoprazole  Injectable 40 milliGRAM(s) IV Push daily  risperiDONE   Tablet 2 milliGRAM(s) Oral daily  sevelamer carbonate Powder 800 milliGRAM(s) Enteral Tube three times a day  tamsulosin 0.4 milliGRAM(s) Oral at bedtime  valproic  acid Syrup 200 milliGRAM(s) Enteral Tube four times a day    Drug Dosing Weight  Height (cm): 172.7 (2022 00:32)  Weight (kg): 126.8 (2022 10:20)  BMI (kg/m2): 42.5 (2022 10:20)  BSA (m2): 2.36 (2022 10:20)    PHYSICAL EXAM:  GENERAL: NAD  EYES: EOMI, PERRLA  NECK: Supple, No JVD; Normal thyroid; Trachea midline: No LAD   NERVOUS SYSTEM:  Alert & Oriented X3,  Motor Strength 5/5 B/L upper and lower extremities; DTRs 2+ intact and symmetric  CHEST/LUNG: No rales, rhonchi, wheezing, breath sounds present bilaterally  HEART: Regular rate and rhythm; No murmurs, no gallops  ABDOMEN: Soft, Nontender, Nondistended; Bowel sounds present, no pain or masses on palpation  : voiding well, [place] in place  EXTREMITIES:  2+ Peripheral Pulses, No clubbing, cyanosis, or edema  SKIN: warm, intact, no lesions     LINES/DRAINS/DEVICES  CENTRAL LINE: [ ] YES [ ] NO  LOCATION:     HATFIELD: [ ] YES [ ] NO     A-LINE:  [ ] YES [ ] NO  LOCATION:       ICU Vital Signs Last 24 Hrs  T(C): 36.7 (2022 07:30), Max: 37.9 (2022 00:00)  T(F): 98 (2022 07:30), Max: 100.2 (2022 00:00)  HR: 49 (2022 12:15) (46 - 104)  BP: 100/88 (2022 12:08) (79/40 - 153/50)  BP(mean): 91 (2022 12:08) (49 - 109)  ABP: --  ABP(mean): --  RR: 14 (2022 12:15) (11 - 45)  SpO2: 100% (2022 12:15) (85% - 100%)    O2 Parameters below as of 2022 11:38      O2 Concentration (%): 40        ABG - ( 2022 03:22 )  pH, Arterial: 7.35  pH, Blood: x     /  pCO2: 44    /  pO2: 102   / HCO3: 24    / Base Excess: -1.5  /  SaO2: 98                     @ 07:01  -  0718 @ 07:00  --------------------------------------------------------  IN: 1268.2 mL / OUT: 4335 mL / NET: -3066.8 mL        Mode: CPAP with PS  FiO2: 40  PEEP: 5  PS: 8  ITime: 0.8  MAP: 8  PIP: 13        LABS:  CBC Full  -  ( 2022 03:53 )  WBC Count : 9.08 K/uL  RBC Count : 2.54 M/uL  Hemoglobin : 8.1 g/dL  Hematocrit : 25.2 %  Platelet Count - Automated : 263 K/uL  Mean Cell Volume : 99.2 fl  Mean Cell Hemoglobin : 31.9 pg  Mean Cell Hemoglobin Concentration : 32.1 gm/dL  Auto Neutrophil # : 8.63 K/uL  Auto Lymphocyte # : 0.27 K/uL  Auto Monocyte # : 0.18 K/uL  Auto Eosinophil # : 0.00 K/uL  Auto Basophil # : 0.00 K/uL  Auto Neutrophil % : 95.0 %  Auto Lymphocyte % : 3.0 %  Auto Monocyte % : 2.0 %  Auto Eosinophil % : 0.0 %  Auto Basophil % : 0.0 %        139  |  105  |  82<H>  ----------------------------<  139<H>  4.5   |  24  |  5.20<H>    Ca    9.7      2022 03:53  Phos  5.0       Mg     3.2         TPro  7.1  /  Alb  2.4<L>  /  TBili  0.2  /  DBili  x   /  AST  23  /  ALT  16  /  AlkPhos  90  -18    PT/INR - ( 2022 08:54 )   PT: 11.1 sec;   INR: 0.93 ratio         PTT - ( 2022 08:54 )  PTT:28.6 sec  Urinalysis Basic - ( 2022 09:05 )    Color: Yellow / Appearance: Clear / S.020 / pH: x  Gluc: x / Ketone: Negative  / Bili: Negative / Urobili: Negative   Blood: x / Protein: 100 / Nitrite: Negative   Leuk Esterase: Trace / RBC: 5-10 /HPF / WBC 6-10 /HPF   Sq Epi: x / Non Sq Epi: Few /HPF / Bacteria: Few /HPF      Culture Results:   No growth ( @ 14:56)      RADIOLOGY & ADDITIONAL STUDIES REVIEWED DURING TEAM ROUNDS    [ ]GOALS OF CARE DISCUSSION WITH PATIENT/FAMILY/PROXY:    CRITICAL CARE TIME SPENT: 35 minutes

## 2022-07-18 NOTE — PROGRESS NOTE ADULT - SUBJECTIVE AND OBJECTIVE BOX
Gluteal wound nchange. no purulence no bleed no crepitus  local wound care  plan for possible EUA when pt stable and downgraded from ICU

## 2022-07-18 NOTE — ADVANCED PRACTICE NURSE CONSULT - ASSESSMENT
This is a 62yr old male patient admitted for Acute Respiratory Failure, presenting with the following:  -There is a Perirectal wound to which the patient is being followed by Surgery with a treatment plan in place to address this issue  -There is evidence of Bilateral Lower Extremity Venous Stasis Ulcers to which there will be a Podiatry consultation for evaluation and treatment of this issue This is a 62yr old male patient admitted for Acute Respiratory Failure, presenting with the following:  -There is a R. Ischial wound to which the patient is being followed by Surgery with a treatment plan in place to address this issue  -There is a Stage 1 Pressure Injury to the Bilateral Heels and Sacral area, as evident by non-blanchable erythema

## 2022-07-18 NOTE — CONSULT NOTE ADULT - SUBJECTIVE AND OBJECTIVE BOX
HPI:  Patient is AAOx0, intubated; So hx was obtained from previous charts and NH paperwork    Patient 62 years old Morbid obese male from [facility] ambulates with walker has PMHx of bph, ckd, and bipolar disorder, COPD not on O2 at home, hypothyroidism, anemia, left DVT, CKD, BPH, HLD, bipolar disorder, ambulatory dysfunction, lymphedema presented with shortness of breath and unresponsivness. The patient was sent here due to sweaty and shaky reaction to taking new Zocor pill. Patient was intubated in ED and ICU was called for Acute hypoxia respiratory failure likely secondary to COPD exacerbation.  (17 Jul 2022 11:15)      PAST MEDICAL & SURGICAL HISTORY:  Hypothyroid      Hyperlipidemia      Ambulatory dysfunction      Anemia      History of BPH      CKD (chronic kidney disease)      Chronic obstructive pulmonary disease (COPD)      Bipolar disorder      Mood disorder      HLD (hyperlipidemia)      BPH (benign prostatic hyperplasia)      Anemia      No significant past surgical history          No Known Allergies      Social Hx:    FAMILY HISTORY:      ALBUTerol    90 MICROgram(s) HFA Inhaler 2 Puff(s) Inhalation every 6 hours PRN  albuterol/ipratropium for Nebulization 3 milliLiter(s) Nebulizer every 6 hours  buPROPion XL (24-Hour) . 150 milliGRAM(s) Oral daily  calcitriol   Capsule 0.25 MICROGram(s) Oral daily  cefepime   IVPB      cefepime   IVPB 1000 milliGRAM(s) IV Intermittent every 12 hours  chlorhexidine 0.12% Liquid 15 milliLiter(s) Oral Mucosa every 12 hours  dexMEDEtomidine Infusion 0.5 MICROgram(s)/kG/Hr IV Continuous <Continuous>  ferrous    sulfate 325 milliGRAM(s) Oral daily  folic acid 1 milliGRAM(s) Oral daily  levothyroxine 125 MICROGram(s) Oral daily  methylPREDNISolone sodium succinate Injectable 40 milliGRAM(s) IV Push two times a day  mupirocin 2% Ointment 1 Application(s) Both Nostrils two times a day  norepinephrine Infusion 0.4 MICROgram(s)/kG/Min IV Continuous <Continuous>  pantoprazole  Injectable 40 milliGRAM(s) IV Push daily  risperiDONE   Tablet 2 milliGRAM(s) Oral daily  sevelamer carbonate 800 milliGRAM(s) Oral every 8 hours  tamsulosin 0.4 milliGRAM(s) Oral at bedtime  valproic  acid Syrup 150 milliGRAM(s) Oral four times a day      MEDICATIONS  (PRN):  ALBUTerol    90 MICROgram(s) HFA Inhaler 2 Puff(s) Inhalation every 6 hours PRN Shortness of Breath and/or Wheezing      T(C): 36.7 (07-18-22 @ 07:30), Max: 37.9 (07-18-22 @ 00:00)  HR: 50 (07-18-22 @ 08:30) (46 - 79)  BP: 122/55 (07-18-22 @ 08:30) (79/40 - 153/50)  RR: 19 (07-18-22 @ 08:30) (12 - 27)  SpO2: 99% (07-18-22 @ 08:30) (85% - 100%)        REVIEW OF SYSTEMS:                           ALL ROS DONE [ X   ]    CONSTITUTIONAL:  LETHARGIC [   ], FEVER [   ], UNRESPONSIVE [   ]  CVS:  CP  [   ], SOB, [   ], PALPITATIONS [   ], DIZZYNESS [   ]  RS: COUGH [   ], SPUTUM [   ]  GI: ABDOMINAL PAIN [   ], NAUSEA [   ], VOMITINGS [   ], DIARRHEA [   ], CONSTIPATION [   ]  :  DYSURIA [   ], NOCTURIA [   ], INCREASED FREQUENCY [   ], DRIBLING [   ],  SKELETAL: PAINFUL JOINTS [   ], SWOLLEN JOINTS [   ], NECK ACHE [   ], LOW BACK ACHE [   ],  SKIN : ULCERS [   ], RASH [   ], ITCHING [   ]  CNS: HEAD ACHE [   ], DOUBLE VISION [   ], BLURRED VISION [   ], AMS / CONFUSION [   ], SEIZURES [   ], WEAKNESS [   ],TINGLING / NUMBNESS [   ]    PHYSICAL EXAMINATION:  GENERAL APPEARANCE: NO DISTRESS  HEENT:  NO PALLOR, NO  JVD,  NO   NODES, NECK SUPPLE  CVS: S1 +, S2 +,   RS: AEEB,  OCCASIONAL  RALES +,   NO RONCHI  ABD: SOFT, NT, NO, BS +  EXT: NO PE  SKIN: WARM,   SKELETAL:  ROM ACCEPTABLE  CNS:  AAO X    ,   DEFICITS    RADIOLOGY :      ASSESSMENT :       PLAN:  HPI:  Patient is AAOx0, intubated; So hx was obtained from previous charts and NH paperwork    Patient 62 years old Morbid obese male from [facility] ambulates with walker has PMHx of bph, ckd, and bipolar disorder, COPD not on O2 at home, hypothyroidism, anemia, left DVT, CKD, BPH, HLD, bipolar disorder, ambulatory dysfunction, lymphedema presented with shortness of breath and unresponsivness. The patient was sent here due to sweaty and shaky reaction to taking new Zocor pill. Patient was intubated in ED and ICU was called for Acute hypoxia respiratory failure likely secondary to COPD exacerbation.  (17 Jul 2022 11:15)    -      HPI:  Patient is AAOx0, intubated; So hx was obtained from previous charts and NH paperwork    Patient 62 years old Morbid obese male from [facility] ambulates with walker has PMHx of bph, ckd, and bipolar disorder, COPD not on O2 at home, hypothyroidism, anemia, left DVT, CKD, BPH, HLD, bipolar disorder, ambulatory dysfunction, lymphedema presented with shortness of breath and unresponsivness. The patient was sent here due to sweaty and shaky reaction to taking new Zocor pill. Patient was intubated in ED and ICU was called for Acute hypoxia respiratory failure likely secondary to COPD exacerbation.  (17 Jul 2022 11:15)      PAST MEDICAL & SURGICAL HISTORY:  Hypothyroid      Hyperlipidemia      Ambulatory dysfunction      Anemia      History of BPH      CKD (chronic kidney disease)      Chronic obstructive pulmonary disease (COPD)      Bipolar disorder      Mood disorder      HLD (hyperlipidemia)      BPH (benign prostatic hyperplasia)      Anemia      No significant past surgical history          No Known Allergies      Social Hx:    FAMILY HISTORY:      ALBUTerol    90 MICROgram(s) HFA Inhaler 2 Puff(s) Inhalation every 6 hours PRN  albuterol/ipratropium for Nebulization 3 milliLiter(s) Nebulizer every 6 hours  buPROPion XL (24-Hour) . 150 milliGRAM(s) Oral daily  calcitriol   Capsule 0.25 MICROGram(s) Oral daily  cefepime   IVPB      cefepime   IVPB 1000 milliGRAM(s) IV Intermittent every 12 hours  chlorhexidine 0.12% Liquid 15 milliLiter(s) Oral Mucosa every 12 hours  dexMEDEtomidine Infusion 0.5 MICROgram(s)/kG/Hr IV Continuous <Continuous>  ferrous    sulfate 325 milliGRAM(s) Oral daily  folic acid 1 milliGRAM(s) Oral daily  levothyroxine 125 MICROGram(s) Oral daily  methylPREDNISolone sodium succinate Injectable 40 milliGRAM(s) IV Push two times a day  mupirocin 2% Ointment 1 Application(s) Both Nostrils two times a day  norepinephrine Infusion 0.4 MICROgram(s)/kG/Min IV Continuous <Continuous>  pantoprazole  Injectable 40 milliGRAM(s) IV Push daily  risperiDONE   Tablet 2 milliGRAM(s) Oral daily  sevelamer carbonate 800 milliGRAM(s) Oral every 8 hours  tamsulosin 0.4 milliGRAM(s) Oral at bedtime  valproic  acid Syrup 150 milliGRAM(s) Oral four times a day      MEDICATIONS  (PRN):  ALBUTerol    90 MICROgram(s) HFA Inhaler 2 Puff(s) Inhalation every 6 hours PRN Shortness of Breath and/or Wheezing      T(C): 36.7 (07-18-22 @ 07:30), Max: 37.9 (07-18-22 @ 00:00)  HR: 50 (07-18-22 @ 08:30) (46 - 79)  BP: 122/55 (07-18-22 @ 08:30) (79/40 - 153/50)  RR: 19 (07-18-22 @ 08:30) (12 - 27)  SpO2: 99% (07-18-22 @ 08:30) (85% - 100%)        REVIEW OF SYSTEMS:                           ALL ROS DONE [ X   ]    CONSTITUTIONAL:  LETHARGIC [   ], FEVER [   ], UNRESPONSIVE [   ]  CVS:  CP  [   ], SOB, [   ], PALPITATIONS [   ], DIZZYNESS [   ]  RS: COUGH [   ], SPUTUM [   ]  GI: ABDOMINAL PAIN [   ], NAUSEA [   ], VOMITINGS [   ], DIARRHEA [   ], CONSTIPATION [   ]  :  DYSURIA [   ], NOCTURIA [   ], INCREASED FREQUENCY [   ], DRIBLING [   ],  SKELETAL: PAINFUL JOINTS [   ], SWOLLEN JOINTS [   ], NECK ACHE [   ], LOW BACK ACHE [   ],  SKIN : ULCERS [   ], RASH [   ], ITCHING [   ]  CNS: HEAD ACHE [   ], DOUBLE VISION [   ], BLURRED VISION [   ], AMS / CONFUSION [   ], SEIZURES [   ], WEAKNESS [   ],TINGLING / NUMBNESS [   ]    PHYSICAL EXAMINATION:  GENERAL APPEARANCE: NO DISTRESS  HEENT:  NO PALLOR, NO  JVD,  NO   NODES, NECK SUPPLE  CVS: S1 +, S2 +,   RS: AEEB,  OCCASIONAL  RALES +,   NO RONCHI  ; ON BIPAP  ABD: SOFT, NT, NO, BS +  EXT: NO PE  SKIN: WARM,   SKELETAL:  ROM ACCEPTABLE  CNS:  AAO X 2    RADIOLOGY :    ACC: [id number] EXAM:  XR CHEST PORTABLE URGENT 1V                        ACC: [id number] EXAM:  XR CHEST PORTABLE URGENT 1V                          PROCEDURE DATE:  07/17/2022          INTERPRETATION:  Portable chest radiograph    CLINICAL INFORMATION: Dyspnea, shortness of breath.    TECHNIQUE:  Portable  AP chest radiograph.    COMPARISON: 9/1/2021 chest .    FINDINGS:  CATHETERS AND TUBES: ET tube tip above tracheal bifurcation. NG tube tip   coiled within the hypopharynx. PULMONARY: LEFT basilar airspace disease   obscures diaphragm contour. Is mild vascular congestion.  .   No pneumothorax.    HEART/VASCULAR: The  heart is enlarged in transverse diameter. .    BONES: Visualized osseous structures are intact.    IMPRESSION:   LEFT basilarairspace disease.  ET tube tip above tracheal bifurcation.  NG tube coiled within the hypopharynx..    Follow-up AP portable chest radiograph 7/17/2022 at 12:59 PM:  ET tube tip above tracheal bifurcation.  NG tube tip beyond GE junction.  Residual LEFT basilar airspace consolidation and/or effusion obscuring   diaphragm contour. RIGHT basilar mild diffuse airspace disease and linear   atelectasis. Upper zones clear.  Cardiomegaly.      ASSESSMENT :       PLAN:  HPI:  Patient is AAOx0, intubated; So hx was obtained from previous charts and NH paperwork    Patient 62 years old Morbid obese male from [facility] ambulates with walker has PMHx of bph, ckd, and bipolar disorder, COPD not on O2 at home, hypothyroidism, anemia, left DVT, CKD, BPH, HLD, bipolar disorder, ambulatory dysfunction, lymphedema presented with shortness of breath and unresponsivness. The patient was sent here due to sweaty and shaky reaction to taking new Zocor pill. Patient was intubated in ED and ICU was called for Acute hypoxia respiratory failure likely secondary to COPD exacerbation.  (17 Jul 2022 11:15)      # ACUTE HYPOXIC RESPIRATORY FAILURE W/ UNDERLYING COPD/TEJ W/ SUSPECTED ASPIRATION PNEUMONITIS  - S/P MECHANICAL VENTILATION  - ON BIPAP  - ON BRONCHODILATORS, SYMBICORT  - ON CEFEPIME, F/U BCX  - CRITICAL CARE CONSULT IN PROGRESS    # ACUTE METABOLIC ENCEPHALOPATHY LIKELY S/T HYPERCAPNIA  - R/O SEIZURE - F/U EEG  - ON HOME DIVALPROEX    # SEPSIS S/T ASPIRATION PNEUMONITIS  - EMPIRICALLY ON CEFEPIME  - ON VASOPRESSORS  - F/U BCX   - CRITICAL CARE CONSULT IN PROGRESS    # SHEYLA ON CKD  # BPH  - MONITORING CR  - AVOID NEPHROTOXIC AGENTS    # PERIANAL SUBCUTANEOUS TISSUE BLEEDING  - CT A/P REVIEWED  - SURGERY CONSULT IN PROGRESS    # CLASS III OBESITY    # GI AND DVT PPX

## 2022-07-19 NOTE — DIETITIAN INITIAL EVALUATION ADULT - NS FNS DIET ORDER
Diet, Full Liquid:   Consistent Carbohydrate {No Snacks}  DASH/TLC {Sodium & Cholesterol Restricted} (07-18-22 @ 16:14)

## 2022-07-19 NOTE — DIETITIAN INITIAL EVALUATION ADULT - NSFNSPHYEXAMSKINFT_GEN_A_CORE
Pressure Injury 1: none, none  Pressure Injury 2: sacrum, Stage I  Pressure Injury 3: Bilateral:,heel, Stage I  Pressure Injury 4: none, none  Pressure Injury 5: none, none  Pressure Injury 6: none, none  Pressure Injury 7: none, none  Pressure Injury 8: none, none  Pressure Injury 9: none, none  Pressure Injury 10: none, none  Pressure Injury 11: none, none

## 2022-07-19 NOTE — PHYSICAL THERAPY INITIAL EVALUATION ADULT - PERTINENT HX OF CURRENT PROBLEM, REHAB EVAL
Patient admitted from home due to SOB and unresponsiveness. Pt. w/h/o obesity, COPD, Schizophrenia disorder

## 2022-07-19 NOTE — DIETITIAN INITIAL EVALUATION ADULT - PERTINENT MEDS FT
MEDICATIONS  (STANDING):  albuterol/ipratropium for Nebulization 3 milliLiter(s) Nebulizer every 6 hours  buPROPion XL (24-Hour) . 150 milliGRAM(s) Oral daily  calcitriol   Capsule 0.25 MICROGram(s) Oral daily  cefepime   IVPB 1000 milliGRAM(s) IV Intermittent every 24 hours  chlorhexidine 2% Cloths 2 Application(s) Topical two times a day  diVALproex  milliGRAM(s) Oral <User Schedule>  ferrous    sulfate 325 milliGRAM(s) Oral daily  folic acid 1 milliGRAM(s) Oral daily  heparin   Injectable 5000 Unit(s) SubCutaneous every 8 hours  levothyroxine 125 MICROGram(s) Oral daily  midodrine 10 milliGRAM(s) Oral every 8 hours  mupirocin 2% Ointment 1 Application(s) Both Nostrils two times a day  norepinephrine Infusion 0.1 MICROgram(s)/kG/Min (27.2 mL/Hr) IV Continuous <Continuous>  pantoprazole  Injectable 40 milliGRAM(s) IV Push daily  predniSONE   Tablet 40 milliGRAM(s) Oral daily  risperiDONE   Tablet 2 milliGRAM(s) Oral daily  sevelamer carbonate 800 milliGRAM(s) Oral every 8 hours  tamsulosin 0.4 milliGRAM(s) Oral at bedtime    MEDICATIONS  (PRN):  ALBUTerol    90 MICROgram(s) HFA Inhaler 2 Puff(s) Inhalation every 6 hours PRN Shortness of Breath and/or Wheezing

## 2022-07-19 NOTE — DIETITIAN INITIAL EVALUATION ADULT - OTHER INFO
From assisted living. S/p extubation 7/18 . Pt on liquids. Pt does not provide detailed info to RD, lunch tray present, pt waiting for feeding assistance.

## 2022-07-19 NOTE — PROGRESS NOTE ADULT - ASSESSMENT
Assessment & Plan  62 years old from [facility], history of morbid obesity, bph, ckd, and bipolar disorder, hypothyroidism lymphedema, COPD, and TEJ on QHS CPAP presented to  on 7/17 for an episode of "shaking a sweating" after taking Zocor, while awaiting disposition became unresponsive secondary to acute hypercapnic respiratory failure requiring intubation, also noted to have perianal bleeding.     Active Issues:  #Acute Hypoxia Hypercapnic respiratory failure   #TEJ  #SHEYLA on CKD  # Prianal Subcutanous tissue bleeding   # COPD    ====Neuro====  #Acute Metabolic Encephalopathy   - Likely secondary to hypercapnia.   - Baseline of A+Ox2 - 3 resides at assisted living.   - Off all sedating medications.   - Currently at baseline mentation now that hypercapnia has resolved.     #Rule Out: Seizure  - Had episode of generalized shaking in ED and received Ativan.   - Continue home dose of Divalproex 500mg BID.     ====CVS====  #Hypotension   - Likely due to sedation, was on low dose Levophed.   - Wean off, start Midodrine if needed.   - TTE in February without acute findings.     ====Pulm====  #Acute hypoxia hypercapnic respiratory failure   - Likely due to TEJ/COPD and non-adherence to CPAP   - Inital ABG showed CO2 >125  - Resolved after intubation/ventilation.   - Tolerated PS trial, extubated 7/18.   - Continue home COPD medications.     ====GI====  #Nutrition  - Diet as tolerated    ====Renal====  #NAKI on CKD   - Non-oliguric, likely pre-renal.   - Monitor for resolution.    ====ID====  #Empiric  - On empiric course of Cefepime due to inital concern for sepsis.   - If no infectious indicators can discontinue.     ====Heme/Onc====  VTE Prophylaxis   - Heparin 5000 q8.   - History of left leg DVT in chart but unable to find positive ultrasound study in EMR.   - Not on full AC at home.     ====Endo====  #bG goal: 140-180    ====Skin/lines====  #Rule Out: Perianal Abcess  - Noted to have perianal skin bleeding in ED requiring sutute.   - CT Abdomen pelvis with skin thickening but no collection.   - Evaluated by surgery Dr. Rojas.      Assessment & Plan  62 years old from [facility], history of morbid obesity, bph, ckd, and bipolar disorder, hypothyroidism lymphedema, COPD, and TEJ on QHS CPAP presented to  on 7/17 for an episode of "shaking a sweating" after taking Zocor, while awaiting disposition became unresponsive secondary to acute hypercapnic respiratory failure requiring intubation, also noted to have perianal bleeding.     Active Issues:  1. Acute hypoxic and hypercapnic respiratory failure   2. Severe sepsis  3. Aspiration pneumonia   4. COPD   5. Acute encephalopathy  6. Perianal soft tissue bleeding  7. Bipolar disorder  8. Chronic venous statis changes of lower extremities   9. Acute renal failure on CKD   10. Hyperlipidemia   11. BPH   12. Class 3 obesity    ====Neuro====  #Acute Metabolic Encephalopathy   - Likely secondary to hypercapnia.   - Baseline of A+Ox2 - 3 resides at assisted living.   - Off all sedating medications.   - Currently at baseline mentation now that hypercapnia has resolved.     #Rule Out: Seizure  - Had episode of generalized shaking in ED and received Ativan.   - Continue home dose of Divalproex 500mg BID.     ====CVS====  #Hypotension   - Likely due to sedation, was on low dose Levophed.   - Wean off, start Midodrine if needed.   - TTE in February without acute findings.     ====Pulm====  #Acute hypoxia hypercapnic respiratory failure   - Likely due to TEJ/COPD and non-adherence to CPAP   - Inital ABG showed CO2 >125  - Resolved after intubation/ventilation.   - Tolerated PS trial, extubated 7/18.   - Continue home COPD medications.   - On NC, not on O2 at assisted living, wean as tolerated.     ====GI====  #Nutrition  - Diet as tolerated    ====Renal====  #NAKI on CKD   - Non-oliguric, likely pre-renal.   - Monitor for resolution.    ====ID====  #Empiric  - On empiric course of Cefepime due to inital concern for sepsis.   - If no infectious indicators can discontinue.     ====Heme/Onc====  VTE Prophylaxis   - Heparin 5000 q8.   - History of left leg DVT in chart but unable to find positive ultrasound study in EMR.   - Not on full AC at home.     ====Endo====  #bG goal: 140-180    ====Skin/lines====  #Rule Out: Perianal Abcess  - Noted to have perianal skin bleeding in ED requiring sutute.   - CT Abdomen pelvis with skin thickening but no collection.   - Evaluated by surgery Dr. Rojas.      Assessment & Plan  62 years old from [facility], history of morbid obesity, bph, ckd, and bipolar disorder, hypothyroidism lymphedema, COPD, and TEJ on QHS CPAP presented to  on 7/17 for an episode of "shaking a sweating" after taking Zocor, while awaiting disposition became unresponsive secondary to acute hypercapnic respiratory failure requiring intubation, also noted to have perianal bleeding.     Active Issues:  1. Acute hypoxic and hypercapnic respiratory failure   2. Severe sepsis  3. Aspiration pneumonia   4. COPD   5. Acute encephalopathy  6. Perianal soft tissue bleeding  7. Bipolar disorder  8. Chronic venous statis changes of lower extremities   9. Acute renal failure on CKD   10. Hyperlipidemia   11. BPH   12. Class 3 obesity    ====Neuro====  #Acute Metabolic Encephalopathy   - Likely secondary to hypercapnia.   - Baseline of A+Ox2 - 3 resides at assisted living.   - Off all sedating medications.   - Currently at baseline mentation now that hypercapnia has resolved.     #Rule Out: Seizure  - Had episode of generalized shaking in ED and received Ativan.   - Continue home dose of Divalproex 500mg BID.     ====CVS====  #Hypotension   - Likely due to sedation, was on low dose Levophed.   - Wean off, start Midodrine if needed.   - TTE in February without acute findings.     ====Pulm====  #Acute hypoxia hypercapnic respiratory failure   - Likely due to TEJ/COPD and non-adherence to CPAP   - Inital ABG showed CO2 >125  - Resolved after intubation/ventilation.   - Tolerated PS trial, extubated 7/18.   - Continue home COPD medications.   - On NC, not on O2 at assisted living, wean as tolerated.     ====GI====  #Nutrition  - Diet as tolerated    ====Renal====  #NAKI on CKD   - Non-oliguric, likely pre-renal.   - Monitor for resolution.  - Attempted Viera removal and trial of void, however had retention.   - Viera replaced, on Flomax, re-attempt in a few days.     ====ID====  #Empiric  - On empiric course of Cefepime due to initial concern for sepsis.   - If no infectious indicators can discontinue.     ====Heme/Onc====  VTE Prophylaxis   - Heparin 5000 q8.   - History of left leg DVT in chart but unable to find positive ultrasound study in EMR.   - Not on full AC at home.     ====Endo====  #bG goal: 140-180    ====Skin/lines====  #Rule Out: Perianal Abcess  - Noted to have perianal skin bleeding in ED requiring sutute.   - CT Abdomen pelvis with skin thickening but no collection.   - Evaluated by surgery Dr. Rojas.      Assessment & Plan  62 years old from [facility], history of morbid obesity, bph, ckd, and bipolar disorder, hypothyroidism lymphedema, COPD, and TEJ on QHS CPAP presented to  on 7/17 for an episode of "shaking a sweating" after taking Zocor, while awaiting disposition became unresponsive secondary to acute hypercapnic respiratory failure requiring intubation, also noted to have perianal bleeding.     Active Issues:  1. Acute hypoxic and hypercapnic respiratory failure   2. Severe sepsis  3. Aspiration pneumonia   4. COPD   5. Acute encephalopathy  6. Perianal soft tissue bleeding  7. Bipolar disorder  8. Chronic venous statis changes of lower extremities   9. Acute renal failure on CKD   10. Hyperlipidemia   11. BPH   12. Class 3 obesity    ====Neuro====  #Acute Metabolic Encephalopathy   - Likely secondary to hypercapnia.   - Baseline of A+Ox2 - 3 resides at assisted living.   - Off all sedating medications.   - Currently at baseline mentation now that hypercapnia has resolved.     #Rule Out: Seizure  - Had episode of generalized shaking in ED and received Ativan.   - Continue home dose of Divalproex 500mg BID.     ====CVS====  #Hypotension   - Likely due to sedation, was on low dose Levophed.   - Wean off, start Midodrine if needed.   - TTE in February without acute findings.     ====Pulm====  #Acute hypoxia hypercapnic respiratory failure   - Likely due to TEJ/COPD and non-adherence to CPAP   - Inital ABG showed CO2 >125  - Resolved after intubation/ventilation.   - Tolerated PS trial, extubated 7/18.   - Continue home COPD medications.   - On NC, not on O2 at assisted living, wean as tolerated.     ====GI====  #Nutrition  - Diet as tolerated    ====Renal====  #NAKI on CKD   - Non-oliguric, likely pre-renal.   - Monitor for resolution.  - Attempted Viera removal and trial of void, however had retention.   - Viera replaced, on Flomax, re-attempt in a few days.     ====ID====  #Empiric  - On empiric course of Cefepime due to initial concern for sepsis.   - If no infectious indicators can discontinue.     ====Heme/Onc====  VTE Prophylaxis   - Heparin 5000 q8.   - History of left leg DVT in chart but unable to find positive ultrasound study in EMR.   - Repeat performed on 7/18, no DVT noted.   - Not on full AC at home.     ====Endo====  #bG goal: 140-180    ====Skin/lines====  #Rule Out: Perianal Abcess  - Noted to have perianal skin bleeding in ED requiring sutute.   - CT Abdomen pelvis with skin thickening but no collection.   - Evaluated by surgery Dr. Rojas.

## 2022-07-19 NOTE — PROGRESS NOTE ADULT - SUBJECTIVE AND OBJECTIVE BOX
Patient is a 62y old  Male who presents with a chief complaint of AHRF due to COPD (2022 11:45)    PATIENT IS SEEN AND EXAMINED IN MEDICAL FLOOR.  SOWMYA [    ]    ALYSIA [   ]      GT [   ]    ALLERGIES:  No Known Allergies      Daily     Daily Weight in k (2022 08:30)    VITALS:    Vital Signs Last 24 Hrs  T(C): 36.4 (2022 07:30), Max: 36.9 (2022 04:16)  T(F): 97.5 (2022 07:30), Max: 98.4 (2022 04:16)  HR: 103 (2022 09:10) (44 - 120)  BP: 128/66 (2022 08:45) (66/44 - 185/109)  BP(mean): 81 (2022 08:45) (30 - 138)  RR: 27 (2022 08:45) (10 - 35)  SpO2: 96% (2022 09:10) (89% - 100%)    Parameters below as of 2022 08:30  Patient On (Oxygen Delivery Method): nasal cannula    O2 Concentration (%): 3    LABS:    CBC Full  -  ( 2022 03:53 )  WBC Count : 8.38 K/uL  RBC Count : 2.46 M/uL  Hemoglobin : 7.7 g/dL  Hematocrit : 24.3 %  Platelet Count - Automated : 265 K/uL  Mean Cell Volume : 98.8 fl  Mean Cell Hemoglobin : 31.3 pg  Mean Cell Hemoglobin Concentration : 31.7 gm/dL  Auto Neutrophil # : 6.62 K/uL  Auto Lymphocyte # : 0.92 K/uL  Auto Monocyte # : 0.77 K/uL  Auto Eosinophil # : 0.00 K/uL  Auto Basophil # : 0.02 K/uL  Auto Neutrophil % : 79.0 %  Auto Lymphocyte % : 11.0 %  Auto Monocyte % : 9.2 %  Auto Eosinophil % : 0.0 %  Auto Basophil % : 0.2 %          142  |  106  |  95<H>  ----------------------------<  119<H>  4.2   |  28  |  5.03<H>    Ca    9.7      2022 03:53  Phos  5.4       Mg     3.4         TPro  7.0  /  Alb  2.5<L>  /  TBili  0.2  /  DBili  x   /  AST  18  /  ALT  17  /  AlkPhos  81      CAPILLARY BLOOD GLUCOSE      POCT Blood Glucose.: 125 mg/dL (2022 17:23)  POCT Blood Glucose.: 149 mg/dL (2022 11:24)        LIVER FUNCTIONS - ( 2022 03:53 )  Alb: 2.5 g/dL / Pro: 7.0 g/dL / ALK PHOS: 81 U/L / ALT: 17 U/L DA / AST: 18 U/L / GGT: x           Creatinine Trend: 5.03<--, 5.20<--, 6.03<--, 5.68<--  I&O's Summary    2022 07:01  -  2022 07:00  --------------------------------------------------------  IN: 259.6 mL / OUT: 2195 mL / NET: -1935.4 mL        ABG - ( 2022 03:33 )  pH, Arterial: 7.30  pH, Blood: x     /  pCO2: 57    /  pO2: 171   / HCO3: 28    / Base Excess: 0.4   /  SaO2: 99                  ET Tube ET Tube   @ 06:18 --  --    No polymorphonuclear leukocytes seen per low power field  Moderate Squamous epithelial cells seen per low power field  Moderate Yeast like cells seen per oil power field  Few Gram positive cocci in pairs seen per oil power field  Rare Gram PositiveRods seen per oil power field  Rare Gram Negative Rods seen per oil power field  Rare Gram Negative Diplococci seen per oil power field      Clean Catch Clean Catch (Midstream)   @ 14:56   No growth  --  --      .Blood Blood-Peripheral   @ 14:46   No growth to date.  --  --          MEDICATIONS:    MEDICATIONS  (STANDING):  albuterol/ipratropium for Nebulization 3 milliLiter(s) Nebulizer every 6 hours  buPROPion XL (24-Hour) . 150 milliGRAM(s) Oral daily  calcitriol   Capsule 0.25 MICROGram(s) Oral daily  cefepime   IVPB 1000 milliGRAM(s) IV Intermittent every 24 hours  chlorhexidine 2% Cloths 2 Application(s) Topical two times a day  diVALproex  milliGRAM(s) Oral <User Schedule>  ferrous    sulfate 325 milliGRAM(s) Oral daily  ferrous    sulfate Liquid 300 milliGRAM(s) Enteral Tube daily  folic acid 1 milliGRAM(s) Oral daily  heparin   Injectable 5000 Unit(s) SubCutaneous every 8 hours  levothyroxine 125 MICROGram(s) Oral daily  mupirocin 2% Ointment 1 Application(s) Both Nostrils two times a day  norepinephrine Infusion 0.1 MICROgram(s)/kG/Min (27.2 mL/Hr) IV Continuous <Continuous>  pantoprazole  Injectable 40 milliGRAM(s) IV Push daily  predniSONE   Tablet 40 milliGRAM(s) Oral daily  risperiDONE   Tablet 2 milliGRAM(s) Oral daily  sevelamer carbonate 800 milliGRAM(s) Oral every 8 hours  tamsulosin 0.4 milliGRAM(s) Oral at bedtime      MEDICATIONS  (PRN):  ALBUTerol    90 MICROgram(s) HFA Inhaler 2 Puff(s) Inhalation every 6 hours PRN Shortness of Breath and/or Wheezing      REVIEW OF SYSTEMS:                           ALL ROS DONE [ X   ]    CONSTITUTIONAL:  LETHARGIC [   ], FEVER [   ], UNRESPONSIVE [   ]  CVS:  CP  [   ], SOB, [   ], PALPITATIONS [   ], DIZZYNESS [   ]  RS: COUGH [   ], SPUTUM [   ]  GI: ABDOMINAL PAIN [   ], NAUSEA [   ], VOMITINGS [   ], DIARRHEA [   ], CONSTIPATION [   ]  :  DYSURIA [   ], NOCTURIA [   ], INCREASED FREQUENCY [   ], DRIBLING [   ],  SKELETAL: PAINFUL JOINTS [   ], SWOLLEN JOINTS [   ], NECK ACHE [   ], LOW BACK ACHE [   ],  SKIN : ULCERS [   ], RASH [   ], ITCHING [   ]  CNS: HEAD ACHE [   ], DOUBLE VISION [   ], BLURRED VISION [   ], AMS / CONFUSION [   ], SEIZURES [   ], WEAKNESS [   ],TINGLING / NUMBNESS [   ]      PHYSICAL EXAMINATION:  GENERAL APPEARANCE: NO DISTRESS  HEENT:  NO PALLOR, NO  JVD,  NO   NODES, NECK SUPPLE  CVS: S1 +, S2 +,   RS: AEEB,  OCCASIONAL  RALES +,   NO RONCHI  ; ON BIPAP  ABD: SOFT, NT, NO, BS +  EXT: NO PE  SKIN: WARM,   SKELETAL:  ROM ACCEPTABLE  CNS:  AAO X 2    RADIOLOGY :    ACC: [id number] EXAM:  XR CHEST PORTABLE URGENT 1V                        ACC: [id number] EXAM:  XR CHEST PORTABLE URGENT 1V                          PROCEDURE DATE:  2022          INTERPRETATION:  Portable chest radiograph    CLINICAL INFORMATION: Dyspnea, shortness of breath.    TECHNIQUE:  Portable  AP chest radiograph.    COMPARISON: 2021 chest .    FINDINGS:  CATHETERS AND TUBES: ET tube tip above tracheal bifurcation. NG tube tip   coiled within the hypopharynx. PULMONARY: LEFT basilar airspace disease   obscures diaphragm contour. Is mild vascular congestion.  .   No pneumothorax.    HEART/VASCULAR: The  heart is enlarged in transverse diameter. .    BONES: Visualized osseous structures are intact.    IMPRESSION:   LEFT basilarairspace disease.  ET tube tip above tracheal bifurcation.  NG tube coiled within the hypopharynx..    Follow-up AP portable chest radiograph 2022 at 12:59 PM:  ET tube tip above tracheal bifurcation.  NG tube tip beyond GE junction.  Residual LEFT basilar airspace consolidation and/or effusion obscuring   diaphragm contour. RIGHT basilar mild diffuse airspace disease and linear   atelectasis. Upper zones clear.  Cardiomegaly.      ASSESSMENT :       PLAN:  HPI:  Patient is AAOx0, intubated; So hx was obtained from previous charts and NH paperwork    Patient 62 years old Morbid obese male from [facility] ambulates with walker has PMHx of bph, ckd, and bipolar disorder, COPD not on O2 at home, hypothyroidism, anemia, left DVT, CKD, BPH, HLD, bipolar disorder, ambulatory dysfunction, lymphedema presented with shortness of breath and unresponsivness. The patient was sent here due to sweaty and shaky reaction to taking new Zocor pill. Patient was intubated in ED and ICU was called for Acute hypoxia respiratory failure likely secondary to COPD exacerbation.  (2022 11:15)      # ACUTE HYPOXIC RESPIRATORY FAILURE W/ UNDERLYING COPD/TEJ W/ SUSPECTED ASPIRATION PNEUMONITIS  - S/P MECHANICAL VENTILATION  - ON BIPAP  - ON BRONCHODILATORS, SYMBICORT  - ON CEFEPIME, F/U BCX  - CRITICAL CARE CONSULT IN PROGRESS    # ACUTE METABOLIC ENCEPHALOPATHY LIKELY S/T HYPERCAPNIA  - R/O SEIZURE - F/U EEG  - ON HOME DIVALPROEX    # SEPSIS S/T ASPIRATION PNEUMONITIS  - EMPIRICALLY ON CEFEPIME  - ON VASOPRESSORS  - F/U BCX   - CRITICAL CARE CONSULT IN PROGRESS    # SHEYLA ON CKD  # BPH  - MONITORING CR  - AVOID NEPHROTOXIC AGENTS    # PERIANAL SUBCUTANEOUS TISSUE BLEEDING  - CT A/P REVIEWED  - SURGERY CONSULT IN PROGRESS    # CLASS III OBESITY    # GI AND DVT PPX   Patient is a 62y old  Male who presents with a chief complaint of AHRF due to COPD (2022 11:45)    PATIENT IS SEEN AND EXAMINED IN MEDICAL FLOOR.  SOWMYA [    ]    ALYSIA [   ]      GT [   ]    ALLERGIES:  No Known Allergies      Daily     Daily Weight in k (2022 08:30)    VITALS:    Vital Signs Last 24 Hrs  T(C): 36.4 (2022 07:30), Max: 36.9 (2022 04:16)  T(F): 97.5 (2022 07:30), Max: 98.4 (2022 04:16)  HR: 103 (2022 09:10) (44 - 120)  BP: 128/66 (2022 08:45) (66/44 - 185/109)  BP(mean): 81 (2022 08:45) (30 - 138)  RR: 27 (2022 08:45) (10 - 35)  SpO2: 96% (2022 09:10) (89% - 100%)    Parameters below as of 2022 08:30  Patient On (Oxygen Delivery Method): nasal cannula    O2 Concentration (%): 3    LABS:    CBC Full  -  ( 2022 03:53 )  WBC Count : 8.38 K/uL  RBC Count : 2.46 M/uL  Hemoglobin : 7.7 g/dL  Hematocrit : 24.3 %  Platelet Count - Automated : 265 K/uL  Mean Cell Volume : 98.8 fl  Mean Cell Hemoglobin : 31.3 pg  Mean Cell Hemoglobin Concentration : 31.7 gm/dL  Auto Neutrophil # : 6.62 K/uL  Auto Lymphocyte # : 0.92 K/uL  Auto Monocyte # : 0.77 K/uL  Auto Eosinophil # : 0.00 K/uL  Auto Basophil # : 0.02 K/uL  Auto Neutrophil % : 79.0 %  Auto Lymphocyte % : 11.0 %  Auto Monocyte % : 9.2 %  Auto Eosinophil % : 0.0 %  Auto Basophil % : 0.2 %          142  |  106  |  95<H>  ----------------------------<  119<H>  4.2   |  28  |  5.03<H>    Ca    9.7      2022 03:53  Phos  5.4       Mg     3.4         TPro  7.0  /  Alb  2.5<L>  /  TBili  0.2  /  DBili  x   /  AST  18  /  ALT  17  /  AlkPhos  81      CAPILLARY BLOOD GLUCOSE      POCT Blood Glucose.: 125 mg/dL (2022 17:23)  POCT Blood Glucose.: 149 mg/dL (2022 11:24)        LIVER FUNCTIONS - ( 2022 03:53 )  Alb: 2.5 g/dL / Pro: 7.0 g/dL / ALK PHOS: 81 U/L / ALT: 17 U/L DA / AST: 18 U/L / GGT: x           Creatinine Trend: 5.03<--, 5.20<--, 6.03<--, 5.68<--  I&O's Summary    2022 07:01  -  2022 07:00  --------------------------------------------------------  IN: 259.6 mL / OUT: 2195 mL / NET: -1935.4 mL        ABG - ( 2022 03:33 )  pH, Arterial: 7.30  pH, Blood: x     /  pCO2: 57    /  pO2: 171   / HCO3: 28    / Base Excess: 0.4   /  SaO2: 99                  ET Tube ET Tube   @ 06:18 --  --    No polymorphonuclear leukocytes seen per low power field  Moderate Squamous epithelial cells seen per low power field  Moderate Yeast like cells seen per oil power field  Few Gram positive cocci in pairs seen per oil power field  Rare Gram PositiveRods seen per oil power field  Rare Gram Negative Rods seen per oil power field  Rare Gram Negative Diplococci seen per oil power field      Clean Catch Clean Catch (Midstream)   @ 14:56   No growth  --  --      .Blood Blood-Peripheral   @ 14:46   No growth to date.  --  --          MEDICATIONS:    MEDICATIONS  (STANDING):  albuterol/ipratropium for Nebulization 3 milliLiter(s) Nebulizer every 6 hours  buPROPion XL (24-Hour) . 150 milliGRAM(s) Oral daily  calcitriol   Capsule 0.25 MICROGram(s) Oral daily  cefepime   IVPB 1000 milliGRAM(s) IV Intermittent every 24 hours  chlorhexidine 2% Cloths 2 Application(s) Topical two times a day  diVALproex  milliGRAM(s) Oral <User Schedule>  ferrous    sulfate 325 milliGRAM(s) Oral daily  ferrous    sulfate Liquid 300 milliGRAM(s) Enteral Tube daily  folic acid 1 milliGRAM(s) Oral daily  heparin   Injectable 5000 Unit(s) SubCutaneous every 8 hours  levothyroxine 125 MICROGram(s) Oral daily  mupirocin 2% Ointment 1 Application(s) Both Nostrils two times a day  norepinephrine Infusion 0.1 MICROgram(s)/kG/Min (27.2 mL/Hr) IV Continuous <Continuous>  pantoprazole  Injectable 40 milliGRAM(s) IV Push daily  predniSONE   Tablet 40 milliGRAM(s) Oral daily  risperiDONE   Tablet 2 milliGRAM(s) Oral daily  sevelamer carbonate 800 milliGRAM(s) Oral every 8 hours  tamsulosin 0.4 milliGRAM(s) Oral at bedtime      MEDICATIONS  (PRN):  ALBUTerol    90 MICROgram(s) HFA Inhaler 2 Puff(s) Inhalation every 6 hours PRN Shortness of Breath and/or Wheezing      REVIEW OF SYSTEMS:                           ALL ROS DONE [ X   ]    CONSTITUTIONAL:  LETHARGIC [   ], FEVER [   ], UNRESPONSIVE [   ]  CVS:  CP  [   ], SOB, [   ], PALPITATIONS [   ], DIZZYNESS [   ]  RS: COUGH [   ], SPUTUM [   ]  GI: ABDOMINAL PAIN [   ], NAUSEA [   ], VOMITINGS [   ], DIARRHEA [   ], CONSTIPATION [   ]  :  DYSURIA [   ], NOCTURIA [   ], INCREASED FREQUENCY [   ], DRIBLING [   ],  SKELETAL: PAINFUL JOINTS [   ], SWOLLEN JOINTS [   ], NECK ACHE [   ], LOW BACK ACHE [   ],  SKIN : ULCERS [   ], RASH [   ], ITCHING [   ]  CNS: HEAD ACHE [   ], DOUBLE VISION [   ], BLURRED VISION [   ], AMS / CONFUSION [   ], SEIZURES [   ], WEAKNESS [   ],TINGLING / NUMBNESS [   ]      PHYSICAL EXAMINATION:  GENERAL APPEARANCE: NO DISTRESS  HEENT:  NO PALLOR, NO  JVD,  NO   NODES, NECK SUPPLE  CVS: S1 +, S2 +,   RS: AEEB,  OCCASIONAL  RALES +,   NO RONCHI  ABD: SOFT, NT, NO, BS +  EXT: NO PE  SKIN: WARM,   SKELETAL:  ROM ACCEPTABLE  CNS:  AAO X 2    RADIOLOGY :    ACC: [id number] EXAM:  XR CHEST PORTABLE URGENT 1V                        ACC: [id number] EXAM:  XR CHEST PORTABLE URGENT 1V                          PROCEDURE DATE:  2022          INTERPRETATION:  Portable chest radiograph    CLINICAL INFORMATION: Dyspnea, shortness of breath.    TECHNIQUE:  Portable  AP chest radiograph.    COMPARISON: 2021 chest .    FINDINGS:  CATHETERS AND TUBES: ET tube tip above tracheal bifurcation. NG tube tip   coiled within the hypopharynx. PULMONARY: LEFT basilar airspace disease   obscures diaphragm contour. Is mild vascular congestion.  .   No pneumothorax.    HEART/VASCULAR: The  heart is enlarged in transverse diameter. .    BONES: Visualized osseous structures are intact.    IMPRESSION:   LEFT basilarairspace disease.  ET tube tip above tracheal bifurcation.  NG tube coiled within the hypopharynx..    Follow-up AP portable chest radiograph 2022 at 12:59 PM:  ET tube tip above tracheal bifurcation.  NG tube tip beyond GE junction.  Residual LEFT basilar airspace consolidation and/or effusion obscuring   diaphragm contour. RIGHT basilar mild diffuse airspace disease and linear   atelectasis. Upper zones clear.  Cardiomegaly.      ASSESSMENT :       PLAN:  HPI:  Patient is AAOx0, intubated; So hx was obtained from previous charts and NH paperwork    Patient 62 years old Morbid obese male from [facility] ambulates with walker has PMHx of bph, ckd, and bipolar disorder, COPD not on O2 at home, hypothyroidism, anemia, left DVT, CKD, BPH, HLD, bipolar disorder, ambulatory dysfunction, lymphedema presented with shortness of breath and unresponsivness. The patient was sent here due to sweaty and shaky reaction to taking new Zocor pill. Patient was intubated in ED and ICU was called for Acute hypoxia respiratory failure likely secondary to COPD exacerbation.  (2022 11:15)      # ACUTE HYPOXIC RESPIRATORY FAILURE W/ UNDERLYING COPD/TEJ W/ SUSPECTED ASPIRATION PNEUMONITIS  - S/P MECHANICAL VENTILATION  - ON NC  - ON BRONCHODILATORS, SYMBICORT  - ON CEFEPIME, F/U BCX  - CRITICAL CARE CONSULT IN PROGRESS    # ACUTE METABOLIC ENCEPHALOPATHY LIKELY S/T HYPERCAPNIA  - R/O SEIZURE - F/U EEG  - ON HOME DIVALPROEX    # SEPSIS S/T ASPIRATION PNEUMONITIS  - EMPIRICALLY ON CEFEPIME  - ON VASOPRESSORS  - F/U BCX   - CRITICAL CARE CONSULT IN PROGRESS    # SHEYLA ON CKD  # BPH , URINARY RETENTION  - MONITORING CR  - AVOID NEPHROTOXIC AGENTS    - FAILED TOV, PLACED RE-PLACED HATFIELD    # PERIANAL SUBCUTANEOUS TISSUE BLEEDING  - CT A/P REVIEWED  - SURGERY CONSULT IN PROGRESS    # CLASS III OBESITY    # GI AND DVT PPX

## 2022-07-19 NOTE — DIETITIAN INITIAL EVALUATION ADULT - PERTINENT LABORATORY DATA
07-19    142  |  106  |  95<H>  ----------------------------<  119<H>  4.2   |  28  |  5.03<H>    Ca    9.7      19 Jul 2022 03:53  Phos  5.4     07-19  Mg     3.4     07-19    TPro  7.0  /  Alb  2.5<L>  /  TBili  0.2  /  DBili  x   /  AST  18  /  ALT  17  /  AlkPhos  81  07-19  POCT Blood Glucose.: 125 mg/dL (07-18-22 @ 17:23)  A1C with Estimated Average Glucose Result: 5.2 % (02-15-22 @ 09:59)  A1C with Estimated Average Glucose Result: 5.1 % (08-31-21 @ 13:42)

## 2022-07-19 NOTE — PROGRESS NOTE ADULT - SUBJECTIVE AND OBJECTIVE BOX
INTERVAL HPI/OVERNIGHT EVENTS:       PRESSORS: [ ] YES [ ] NO  WHICH:    ANTIBIOTICS:                  DATE STARTED:  ANTIBIOTICS:                  DATE STARTED:    Antimicrobial:  cefepime   IVPB 1000 milliGRAM(s) IV Intermittent every 24 hours    Cardiovascular:  norepinephrine Infusion 0.1 MICROgram(s)/kG/Min IV Continuous <Continuous>  tamsulosin 0.4 milliGRAM(s) Oral at bedtime    Pulmonary:  ALBUTerol    90 MICROgram(s) HFA Inhaler 2 Puff(s) Inhalation every 6 hours PRN  albuterol/ipratropium for Nebulization 3 milliLiter(s) Nebulizer every 6 hours    Hematalogic:  heparin   Injectable 5000 Unit(s) SubCutaneous every 8 hours    Other:  buPROPion XL (24-Hour) . 150 milliGRAM(s) Oral daily  calcitriol   Capsule 0.25 MICROGram(s) Oral daily  chlorhexidine 2% Cloths 2 Application(s) Topical two times a day  diVALproex  milliGRAM(s) Oral <User Schedule>  ferrous    sulfate 325 milliGRAM(s) Oral daily  ferrous    sulfate Liquid 300 milliGRAM(s) Enteral Tube daily  folic acid 1 milliGRAM(s) Oral daily  levothyroxine 125 MICROGram(s) Oral daily  mupirocin 2% Ointment 1 Application(s) Both Nostrils two times a day  pantoprazole  Injectable 40 milliGRAM(s) IV Push daily  predniSONE   Tablet 40 milliGRAM(s) Oral daily  risperiDONE   Tablet 2 milliGRAM(s) Oral daily  sevelamer carbonate 800 milliGRAM(s) Oral every 8 hours    ALBUTerol    90 MICROgram(s) HFA Inhaler 2 Puff(s) Inhalation every 6 hours PRN  albuterol/ipratropium for Nebulization 3 milliLiter(s) Nebulizer every 6 hours  buPROPion XL (24-Hour) . 150 milliGRAM(s) Oral daily  calcitriol   Capsule 0.25 MICROGram(s) Oral daily  cefepime   IVPB 1000 milliGRAM(s) IV Intermittent every 24 hours  chlorhexidine 2% Cloths 2 Application(s) Topical two times a day  diVALproex  milliGRAM(s) Oral <User Schedule>  ferrous    sulfate 325 milliGRAM(s) Oral daily  ferrous    sulfate Liquid 300 milliGRAM(s) Enteral Tube daily  folic acid 1 milliGRAM(s) Oral daily  heparin   Injectable 5000 Unit(s) SubCutaneous every 8 hours  levothyroxine 125 MICROGram(s) Oral daily  mupirocin 2% Ointment 1 Application(s) Both Nostrils two times a day  norepinephrine Infusion 0.1 MICROgram(s)/kG/Min IV Continuous <Continuous>  pantoprazole  Injectable 40 milliGRAM(s) IV Push daily  predniSONE   Tablet 40 milliGRAM(s) Oral daily  risperiDONE   Tablet 2 milliGRAM(s) Oral daily  sevelamer carbonate 800 milliGRAM(s) Oral every 8 hours  tamsulosin 0.4 milliGRAM(s) Oral at bedtime    Drug Dosing Weight  Height (cm): 172.7 (2022 00:32)  Weight (kg): 126.8 (2022 10:20)  BMI (kg/m2): 42.5 (2022 10:20)  BSA (m2): 2.36 (2022 10:20)    PHYSICAL EXAM:  GENERAL: NAD  EYES: EOMI, PERRLA  NECK: Supple, No JVD; Normal thyroid; Trachea midline: No LAD   NERVOUS SYSTEM:  Alert & Oriented X3,  Motor Strength 5/5 B/L upper and lower extremities; DTRs 2+ intact and symmetric  CHEST/LUNG: No rales, rhonchi, wheezing, breath sounds present bilaterally  HEART: Regular rate and rhythm; No murmurs, no gallops  ABDOMEN: Soft, Nontender, Nondistended; Bowel sounds present, no pain or masses on palpation  : voiding well, Hatfield in place  EXTREMITIES:  2+ Peripheral Pulses, No clubbing, cyanosis, or edema  SKIN: warm, intact, no lesions     LINES/DRAINS/DEVICES  CENTRAL LINE: [ ] YES [ ] NO  LOCATION:     HATFIELD: [ ] YES [ ] NO     A-LINE:  [ ] YES [ ] NO  LOCATION:       ICU Vital Signs Last 24 Hrs  T(C): 36.9 (2022 04:16), Max: 36.9 (2022 04:16)  T(F): 98.4 (2022 04:16), Max: 98.4 (2022 04:16)  HR: 86 (2022 07:17) (44 - 120)  BP: 119/102 (2022 07:17) (66/44 - 185/109)  BP(mean): 106 (2022 07:17) (30 - 138)  ABP: --  ABP(mean): --  RR: 13 (2022 07:17) (10 - 45)  SpO2: 91% (2022 07:17) (88% - 100%)    O2 Parameters below as of 2022 02:00  Patient On (Oxygen Delivery Method): BiPAP/CPAP            ABG - ( 2022 03:33 )  pH, Arterial: 7.30  pH, Blood: x     /  pCO2: 57    /  pO2: 171   / HCO3: 28    / Base Excess: 0.4   /  SaO2: 99                    0718 @ 07:01  -   @ 07:00  --------------------------------------------------------  IN: 259.6 mL / OUT: 2195 mL / NET: -1935.4 mL        Mode: CPAP with PS  FiO2: 40  PEEP: 5  PS: 8  ITime: 0.8  MAP: 8  PIP: 13        LABS:  CBC Full  -  ( 2022 03:53 )  WBC Count : 8.38 K/uL  RBC Count : 2.46 M/uL  Hemoglobin : 7.7 g/dL  Hematocrit : 24.3 %  Platelet Count - Automated : 265 K/uL  Mean Cell Volume : 98.8 fl  Mean Cell Hemoglobin : 31.3 pg  Mean Cell Hemoglobin Concentration : 31.7 gm/dL  Auto Neutrophil # : 6.62 K/uL  Auto Lymphocyte # : 0.92 K/uL  Auto Monocyte # : 0.77 K/uL  Auto Eosinophil # : 0.00 K/uL  Auto Basophil # : 0.02 K/uL  Auto Neutrophil % : 79.0 %  Auto Lymphocyte % : 11.0 %  Auto Monocyte % : 9.2 %  Auto Eosinophil % : 0.0 %  Auto Basophil % : 0.2 %        142  |  106  |  95<H>  ----------------------------<  119<H>  4.2   |  28  |  5.03<H>    Ca    9.7      2022 03:53  Phos  5.4       Mg     3.4     07-19    TPro  7.0  /  Alb  2.5<L>  /  TBili  0.2  /  DBili  x   /  AST  18  /  ALT  17  /  AlkPhos  81  07-    PT/INR - ( 2022 08:54 )   PT: 11.1 sec;   INR: 0.93 ratio         PTT - ( 2022 08:54 )  PTT:28.6 sec  Urinalysis Basic - ( 2022 09:05 )    Color: Yellow / Appearance: Clear / S.020 / pH: x  Gluc: x / Ketone: Negative  / Bili: Negative / Urobili: Negative   Blood: x / Protein: 100 / Nitrite: Negative   Leuk Esterase: Trace / RBC: 5-10 /HPF / WBC 6-10 /HPF   Sq Epi: x / Non Sq Epi: Few /HPF / Bacteria: Few /HPF      Culture Results:   No growth ( @ 14:56)  Culture Results:   No growth to date. ( @ 14:46)  Culture Results:   No growth to date. ( @ 14:46)      RADIOLOGY & ADDITIONAL STUDIES REVIEWED DURING TEAM ROUNDS    [ ]GOALS OF CARE DISCUSSION WITH PATIENT/FAMILY/PROXY:    CRITICAL CARE TIME SPENT: 35 minutes   INTERVAL HPI/OVERNIGHT EVENTS: On BiPAP overnight, has Hatfield discontinued but developed urinary retention, had straight catheter performed x1.       PRESSORS: [ ] YES [X] NO  WHICH:    ANTIBIOTICS:                  DATE STARTED:  ANTIBIOTICS:                  DATE STARTED:    Antimicrobial:  cefepime   IVPB 1000 milliGRAM(s) IV Intermittent every 24 hours    Cardiovascular:  norepinephrine Infusion 0.1 MICROgram(s)/kG/Min IV Continuous <Continuous>  tamsulosin 0.4 milliGRAM(s) Oral at bedtime    Pulmonary:  ALBUTerol    90 MICROgram(s) HFA Inhaler 2 Puff(s) Inhalation every 6 hours PRN  albuterol/ipratropium for Nebulization 3 milliLiter(s) Nebulizer every 6 hours    Hematalogic:  heparin   Injectable 5000 Unit(s) SubCutaneous every 8 hours    Other:  buPROPion XL (24-Hour) . 150 milliGRAM(s) Oral daily  calcitriol   Capsule 0.25 MICROGram(s) Oral daily  chlorhexidine 2% Cloths 2 Application(s) Topical two times a day  diVALproex  milliGRAM(s) Oral <User Schedule>  ferrous    sulfate 325 milliGRAM(s) Oral daily  ferrous    sulfate Liquid 300 milliGRAM(s) Enteral Tube daily  folic acid 1 milliGRAM(s) Oral daily  levothyroxine 125 MICROGram(s) Oral daily  mupirocin 2% Ointment 1 Application(s) Both Nostrils two times a day  pantoprazole  Injectable 40 milliGRAM(s) IV Push daily  predniSONE   Tablet 40 milliGRAM(s) Oral daily  risperiDONE   Tablet 2 milliGRAM(s) Oral daily  sevelamer carbonate 800 milliGRAM(s) Oral every 8 hours    ALBUTerol    90 MICROgram(s) HFA Inhaler 2 Puff(s) Inhalation every 6 hours PRN  albuterol/ipratropium for Nebulization 3 milliLiter(s) Nebulizer every 6 hours  buPROPion XL (24-Hour) . 150 milliGRAM(s) Oral daily  calcitriol   Capsule 0.25 MICROGram(s) Oral daily  cefepime   IVPB 1000 milliGRAM(s) IV Intermittent every 24 hours  chlorhexidine 2% Cloths 2 Application(s) Topical two times a day  diVALproex  milliGRAM(s) Oral <User Schedule>  ferrous    sulfate 325 milliGRAM(s) Oral daily  ferrous    sulfate Liquid 300 milliGRAM(s) Enteral Tube daily  folic acid 1 milliGRAM(s) Oral daily  heparin   Injectable 5000 Unit(s) SubCutaneous every 8 hours  levothyroxine 125 MICROGram(s) Oral daily  mupirocin 2% Ointment 1 Application(s) Both Nostrils two times a day  norepinephrine Infusion 0.1 MICROgram(s)/kG/Min IV Continuous <Continuous>  pantoprazole  Injectable 40 milliGRAM(s) IV Push daily  predniSONE   Tablet 40 milliGRAM(s) Oral daily  risperiDONE   Tablet 2 milliGRAM(s) Oral daily  sevelamer carbonate 800 milliGRAM(s) Oral every 8 hours  tamsulosin 0.4 milliGRAM(s) Oral at bedtime    Drug Dosing Weight  Height (cm): 172.7 (2022 00:32)  Weight (kg): 126.8 (2022 10:20)  BMI (kg/m2): 42.5 (2022 10:20)  BSA (m2): 2.36 (2022 10:20)    PHYSICAL EXAM:  GENERAL: NAD  EYES: EOMI, PERRLA  NECK: Supple, No JVD; Normal thyroid; Trachea midline: No LAD   NERVOUS SYSTEM:  Alert & Oriented X3,  Motor Strength 5/5 B/L upper and lower extremities; DTRs 2+ intact and symmetric  CHEST/LUNG: No rales, rhonchi, wheezing, breath sounds present bilaterally  HEART: Regular rate and rhythm; No murmurs, no gallops  ABDOMEN: Soft, Nontender, Nondistended; Bowel sounds present, no pain or masses on palpation  : voiding well, Hatfield in place  EXTREMITIES:  2+ Peripheral Pulses, No clubbing, cyanosis, or edema  SKIN: warm, intact, no lesions     LINES/DRAINS/DEVICES  CENTRAL LINE: [ ] YES [ ] NO  LOCATION:     HATFIELD: [ ] YES [ ] NO     A-LINE:  [ ] YES [ ] NO  LOCATION:       ICU Vital Signs Last 24 Hrs  T(C): 36.9 (2022 04:16), Max: 36.9 (2022 04:16)  T(F): 98.4 (2022 04:16), Max: 98.4 (2022 04:16)  HR: 86 (2022 07:17) (44 - 120)  BP: 119/102 (2022 07:17) (66/44 - 185/109)  BP(mean): 106 (2022 07:17) (30 - 138)  ABP: --  ABP(mean): --  RR: 13 (2022 07:17) (10 - 45)  SpO2: 91% (2022 07:17) (88% - 100%)    O2 Parameters below as of 2022 02:00  Patient On (Oxygen Delivery Method): BiPAP/CPAP            ABG - ( 2022 03:33 )  pH, Arterial: 7.30  pH, Blood: x     /  pCO2: 57    /  pO2: 171   / HCO3: 28    / Base Excess: 0.4   /  SaO2: 99                    0718 @ 07:01  -  07-19 @ 07:00  --------------------------------------------------------  IN: 259.6 mL / OUT: 2195 mL / NET: -1935.4 mL        Mode: CPAP with PS  FiO2: 40  PEEP: 5  PS: 8  ITime: 0.8  MAP: 8  PIP: 13        LABS:  CBC Full  -  ( 2022 03:53 )  WBC Count : 8.38 K/uL  RBC Count : 2.46 M/uL  Hemoglobin : 7.7 g/dL  Hematocrit : 24.3 %  Platelet Count - Automated : 265 K/uL  Mean Cell Volume : 98.8 fl  Mean Cell Hemoglobin : 31.3 pg  Mean Cell Hemoglobin Concentration : 31.7 gm/dL  Auto Neutrophil # : 6.62 K/uL  Auto Lymphocyte # : 0.92 K/uL  Auto Monocyte # : 0.77 K/uL  Auto Eosinophil # : 0.00 K/uL  Auto Basophil # : 0.02 K/uL  Auto Neutrophil % : 79.0 %  Auto Lymphocyte % : 11.0 %  Auto Monocyte % : 9.2 %  Auto Eosinophil % : 0.0 %  Auto Basophil % : 0.2 %        142  |  106  |  95<H>  ----------------------------<  119<H>  4.2   |  28  |  5.03<H>    Ca    9.7      2022 03:53  Phos  5.4       Mg     3.4         TPro  7.0  /  Alb  2.5<L>  /  TBili  0.2  /  DBili  x   /  AST  18  /  ALT  17  /  AlkPhos  81  -    PT/INR - ( 2022 08:54 )   PT: 11.1 sec;   INR: 0.93 ratio         PTT - ( 2022 08:54 )  PTT:28.6 sec  Urinalysis Basic - ( 2022 09:05 )    Color: Yellow / Appearance: Clear / S.020 / pH: x  Gluc: x / Ketone: Negative  / Bili: Negative / Urobili: Negative   Blood: x / Protein: 100 / Nitrite: Negative   Leuk Esterase: Trace / RBC: 5-10 /HPF / WBC 6-10 /HPF   Sq Epi: x / Non Sq Epi: Few /HPF / Bacteria: Few /HPF      Culture Results:   No growth ( @ 14:56)  Culture Results:   No growth to date. ( @ 14:46)  Culture Results:   No growth to date. ( @ 14:46)      RADIOLOGY & ADDITIONAL STUDIES REVIEWED DURING TEAM ROUNDS    [ ]GOALS OF CARE DISCUSSION WITH PATIENT/FAMILY/PROXY:    CRITICAL CARE TIME SPENT: 35 minutes

## 2022-07-19 NOTE — PROGRESS NOTE ADULT - CRITICAL CARE ATTENDING COMMENT
62 years old Morbidly obese male from [facility] ambulates with walker has PMHx of COPD not on O2 at home, hypothyroidism, anemia, left DVT, CKD, BPH, HLD, bipolar disorder, ambulatory dysfunction, lymphedema presented with shortness of breath and unresponsiveness The patient was sent here due to sweaty and shaky reaction to taking new Zocor pill. Patient was intubated in ED and ICU was called for Acute hypoxia and hypercapnic respiratory failure. As per triage note patient endorsed shortness of breath.     Assessment:  1. Acute hypoxic and hypercapnic respiratory failure   2. Severe sepsis  3. Aspiration pneumonia   4. COPD   5. Acute encephalopathy  6. Perianal soft tissue bleeding  7. Bipolar disorder  8. Chronic venous statis changes of lower extremities   9. Acute renal failure on CKD   10. Hyperlipidemia   11. BPH   12. Class 3 obesity    Plan:  - reported patient on NIV at facility, likely cause of respiratory failure was hypercapnia due not being placed on NIV. may have aspirated when unresponsive.   - Awake this morning, tolerating PS  - ABG improved  - Extubated patient to Bipap   - CT head noted with out acute pathology   - Empiric antibiotics for possible aspiration   - Follow up cultures, including sputum culture  - viral panel negative  - S/p IV fluid resuscitation in the ED  - Cont. home psych meds  - Cont. bronchodilators  - Prednisone taper as no evidence of wheezing on exam  - US of lower extremities with out DVT  - Surgery evaluation of bleeding wound, s/p surgicel placement and suturing in the ED  - Viera to monitor urine output  - Cont. ICU care for today
62 years old Morbidly obese male from [facility] ambulates with walker has PMHx of COPD not on O2 at home, hypothyroidism, anemia, left DVT, CKD, BPH, HLD, bipolar disorder, ambulatory dysfunction, lymphedema presented with shortness of breath and unresponsiveness The patient was sent here due to sweaty and shaky reaction to taking new Zocor pill. Patient was intubated in ED and ICU was called for Acute hypoxia and hypercapnic respiratory failure. As per triage note patient endorsed shortness of breath.     Assessment:  1. Acute hypoxic and hypercapnic respiratory failure   2. Severe sepsis  3. Aspiration pneumonia   4. COPD   5. Acute encephalopathy  6. Perianal soft tissue bleeding  7. Bipolar disorder  8. Chronic venous statis changes of lower extremities   9. Acute renal failure on CKD   10. Hyperlipidemia   11. BPH   12. Class 3 obesity    Plan:  - Extubated 7/18  - Cont. Nocturnal bipap support  - ABG improved  - CT head noted with out acute pathology   - Empiric antibiotics for possible aspiration   - Follow up cultures, including sputum culture  - viral panel negative  - S/p IV fluid resuscitation in the ED  - Cont. home psych meds  - Cont. bronchodilators  - Prednisone taper as no evidence of wheezing on exam  - US of lower extremities with out DVT  - Failed void trial, daley reinserted   - Taper vasopressors  - PT evaluation  - OOB to chair  - Cont. ICU care for today

## 2022-07-19 NOTE — PHYSICAL THERAPY INITIAL EVALUATION ADULT - RANGE OF MOTION EXAMINATION, REHAB EVAL
Left LE ROM was WNL (within normal limits)/Right LE ROM was WNL (within normal limits)/Left LE ROM was WFL (within functional limits)/Right LE ROM was WFL (within functional limits)

## 2022-07-20 NOTE — PROGRESS NOTE ADULT - SUBJECTIVE AND OBJECTIVE BOX
`Patient is a 62y old  Male who presents with a chief complaint of Acute respiratory failure with hypercapnia     (2022 12:03)    PATIENT IS SEEN AND EXAMINED IN MEDICAL FLOOR.  SOWMYA [    ]    ALYSIA [   ]      GT [   ]    ALLERGIES:  No Known Allergies      Daily     Daily Weight in k (2022 07:00)    VITALS:    Vital Signs Last 24 Hrs  T(C): 37.6 (2022 16:57), Max: 37.6 (2022 16:57)  T(F): 99.6 (2022 16:57), Max: 99.6 (2022 16:57)  HR: 104 (2022 16:57) (62 - 109)  BP: 123/59 (2022 16:57) (85/46 - 138/61)  BP(mean): 81 (2022 15:17) (50 - 106)  RR: 18 (2022 16:57) (11 - 24)  SpO2: 97% (2022 16:57) (87% - 100%)    Parameters below as of 2022 16:57  Patient On (Oxygen Delivery Method): nasal cannula  O2 Flow (L/min): 2      LABS:    CBC Full  -  ( 2022 03:58 )  WBC Count : 7.68 K/uL  RBC Count : 2.47 M/uL  Hemoglobin : 7.7 g/dL  Hematocrit : 25.0 %  Platelet Count - Automated : 268 K/uL  Mean Cell Volume : 101.2 fl  Mean Cell Hemoglobin : 31.2 pg  Mean Cell Hemoglobin Concentration : 30.8 gm/dL  Auto Neutrophil # : 6.12 K/uL  Auto Lymphocyte # : 0.79 K/uL  Auto Monocyte # : 0.69 K/uL  Auto Eosinophil # : 0.00 K/uL  Auto Basophil # : 0.01 K/uL  Auto Neutrophil % : 79.7 %  Auto Lymphocyte % : 10.3 %  Auto Monocyte % : 9.0 %  Auto Eosinophil % : 0.0 %  Auto Basophil % : 0.1 %      07-20    143  |  109<H>  |  91<H>  ----------------------------<  105<H>  4.3   |  28  |  4.66<H>    Ca    9.9      2022 03:58  Phos  5.6     20  Mg     3.5     -    TPro  7.1  /  Alb  2.5<L>  /  TBili  0.2  /  DBili  x   /  AST  13  /  ALT  14  /  AlkPhos  79  07-20    CAPILLARY BLOOD GLUCOSE            LIVER FUNCTIONS - ( 2022 03:58 )  Alb: 2.5 g/dL / Pro: 7.1 g/dL / ALK PHOS: 79 U/L / ALT: 14 U/L DA / AST: 13 U/L / GGT: x           Creatinine Trend: 4.66<--, 5.03<--, 5.20<--, 6.03<--, 5.68<--  I&O's Summary    2022 07:01  -  2022 07:00  --------------------------------------------------------  IN: 917 mL / OUT: 2535 mL / NET: -1618 mL    2022 07:01  -  2022 17:28  --------------------------------------------------------  IN: 240 mL / OUT: 1300 mL / NET: -1060 mL        ABG - ( 2022 03:33 )  pH, Arterial: 7.30  pH, Blood: x     /  pCO2: 57    /  pO2: 171   / HCO3: 28    / Base Excess: 0.4   /  SaO2: 99                  ET Tube ET Tube   @ 06:18   Normal Respiratory Ivelisse present  --    No polymorphonuclear leukocytes seen per low power field  Moderate Squamous epithelial cells seen per low power field  Moderate Yeast like cells seen per oil power field  Few Gram positive cocci in pairs seen per oil power field  Rare Gram PositiveRods seen per oil power field  Rare Gram Negative Rods seen per oil power field  Rare Gram Negative Diplococci seen per oil power field      Clean Catch Clean Catch (Midstream)   @ 14:56   No growth  --  --      .Blood Blood-Peripheral   @ 14:46   No growth to date.  --  --          MEDICATIONS:    MEDICATIONS  (STANDING):  albuterol/ipratropium for Nebulization 3 milliLiter(s) Nebulizer every 6 hours  buPROPion XL (24-Hour) . 150 milliGRAM(s) Oral daily  calcitriol   Capsule 0.25 MICROGram(s) Oral daily  cefepime   IVPB 1000 milliGRAM(s) IV Intermittent every 24 hours  chlorhexidine 2% Cloths 2 Application(s) Topical two times a day  diVALproex  milliGRAM(s) Oral <User Schedule>  ferrous    sulfate 325 milliGRAM(s) Oral daily  finasteride 5 milliGRAM(s) Oral daily  folic acid 1 milliGRAM(s) Oral daily  heparin   Injectable 5000 Unit(s) SubCutaneous every 8 hours  levothyroxine 125 MICROGram(s) Oral daily  lidocaine   4% Patch 1 Patch Transdermal daily  midodrine 10 milliGRAM(s) Oral every 8 hours  mupirocin 2% Ointment 1 Application(s) Both Nostrils two times a day  pantoprazole    Tablet 40 milliGRAM(s) Oral before breakfast  predniSONE   Tablet 40 milliGRAM(s) Oral daily  risperiDONE   Tablet 2 milliGRAM(s) Oral daily  sevelamer carbonate 800 milliGRAM(s) Oral every 8 hours  simvastatin 20 milliGRAM(s) Oral at bedtime  tamsulosin 0.4 milliGRAM(s) Oral at bedtime      MEDICATIONS  (PRN):  acetaminophen     Tablet .. 650 milliGRAM(s) Oral every 6 hours PRN Mild Pain (1 - 3), Moderate Pain (4 - 6)  ALBUTerol    90 MICROgram(s) HFA Inhaler 2 Puff(s) Inhalation every 6 hours PRN Shortness of Breath and/or Wheezing      REVIEW OF SYSTEMS:                           ALL ROS DONE [ X   ]    CONSTITUTIONAL:  LETHARGIC [   ], FEVER [   ], UNRESPONSIVE [   ]  CVS:  CP  [   ], SOB, [   ], PALPITATIONS [   ], DIZZYNESS [   ]  RS: COUGH [   ], SPUTUM [   ]  GI: ABDOMINAL PAIN [   ], NAUSEA [   ], VOMITINGS [   ], DIARRHEA [   ], CONSTIPATION [   ]  :  DYSURIA [   ], NOCTURIA [   ], INCREASED FREQUENCY [   ], DRIBLING [   ],  SKELETAL: PAINFUL JOINTS [   ], SWOLLEN JOINTS [   ], NECK ACHE [   ], LOW BACK ACHE [   ],  SKIN : ULCERS [   ], RASH [   ], ITCHING [   ]  CNS: HEAD ACHE [   ], DOUBLE VISION [   ], BLURRED VISION [   ], AMS / CONFUSION [   ], SEIZURES [   ], WEAKNESS [   ],TINGLING / NUMBNESS [   ]    PHYSICAL EXAMINATION:  GENERAL APPEARANCE: NO DISTRESS  HEENT:  NO PALLOR, NO  JVD,  NO   NODES, NECK SUPPLE  CVS: S1 +, S2 +,   RS: AEEB,  OCCASIONAL  RALES +,   NO RONCHI  ABD: SOFT, NT, NO, BS +  EXT: NO PE  SKIN: WARM,   SKELETAL:  ROM ACCEPTABLE  CNS:  AAO X 2    RADIOLOGY :    ACC: [id number] EXAM:  XR CHEST PORTABLE URGENT 1V                        ACC: [id number] EXAM:  XR CHEST PORTABLE URGENT 1V                          PROCEDURE DATE:  2022          INTERPRETATION:  Portable chest radiograph    CLINICAL INFORMATION: Dyspnea, shortness of breath.    TECHNIQUE:  Portable  AP chest radiograph.    COMPARISON: 2021 chest .    FINDINGS:  CATHETERS AND TUBES: ET tube tip above tracheal bifurcation. NG tube tip   coiled within the hypopharynx. PULMONARY: LEFT basilar airspace disease   obscures diaphragm contour. Is mild vascular congestion.  .   No pneumothorax.    HEART/VASCULAR: The  heart is enlarged in transverse diameter. .    BONES: Visualized osseous structures are intact.    IMPRESSION:   LEFT basilarairspace disease.  ET tube tip above tracheal bifurcation.  NG tube coiled within the hypopharynx..    Follow-up AP portable chest radiograph 2022 at 12:59 PM:  ET tube tip above tracheal bifurcation.  NG tube tip beyond GE junction.  Residual LEFT basilar airspace consolidation and/or effusion obscuring   diaphragm contour. RIGHT basilar mild diffuse airspace disease and linear   atelectasis. Upper zones clear.  Cardiomegaly.      ASSESSMENT :       PLAN:  HPI:  Patient is AAOx0, intubated; So hx was obtained from previous charts and NH paperwork    Patient 62 years old Morbid obese male from [facility] ambulates with walker has PMHx of bph, ckd, and bipolar disorder, COPD not on O2 at home, hypothyroidism, anemia, left DVT, CKD, BPH, HLD, bipolar disorder, ambulatory dysfunction, lymphedema presented with shortness of breath and unresponsivness. The patient was sent here due to sweaty and shaky reaction to taking new Zocor pill. Patient was intubated in ED and ICU was called for Acute hypoxia respiratory failure likely secondary to COPD exacerbation.  (2022 11:15)      # ACUTE HYPOXIC RESPIRATORY FAILURE W/ UNDERLYING COPD/TEJ W/ SUSPECTED ASPIRATION PNEUMONITIS  - S/P MECHANICAL VENTILATION  - ON NC  - ON BRONCHODILATORS, SYMBICORT  - ON CEFEPIME, F/U BCX  - CRITICAL CARE CONSULT IN PROGRESS    # ACUTE METABOLIC ENCEPHALOPATHY LIKELY S/T HYPERCAPNIA  - R/O SEIZURE - F/U EEG  - ON HOME DIVALPROEX    # SEPSIS S/T ASPIRATION PNEUMONITIS  - EMPIRICALLY ON CEFEPIME  - S/P VASOPRESSORS  - F/U BCX   - CRITICAL CARE CONSULT IN PROGRESS    # SHEYLA ON CKD  # BPH , URINARY RETENTION  - MONITORING CR  - AVOID NEPHROTOXIC AGENTS    - FAILED TOV, PLACED RE-PLACED HATFIELD    # PERIANAL SUBCUTANEOUS TISSUE BLEEDING  - CT A/P REVIEWED  - SURGERY CONSULT IN PROGRESS    # CLASS III OBESITY    # GI AND DVT PPX

## 2022-07-20 NOTE — PROGRESS NOTE ADULT - PROBLEM SELECTOR PLAN 9
perianal wound stable  Surgery recommending outpatient follow up, no surgical intervention at this time

## 2022-07-20 NOTE — PROGRESS NOTE ADULT - ASSESSMENT
Patient is a 62 years old male from [facility], with PMH of COPD/TEJ- CPAP at night, CHF Grade 1 LV diastolic dysfunction, HLD, PAD, CKD 4, anemia of chronic disease, hypothyroidism, lymphedema,  Chronic Back Pain, OP, Polyarthritis, peripheral neuropathy. Patient presented to ED from assisted living after taking Zocor developed sweats and shakes, was to return to assisted living, however ED course complicated after patient became unresponsive for CO2 retention from COPD. Patient was emergently mechanically intubated and sedated in ED. ICU consulted. Patient admitted to ICU for Acute Hypoxic Respiratory Failure Secondary to COPD exacerbation. Surgery consulted for perianal bleeding from punctate site, unexplained, no trauma. Suture placed in ED.   CT chest, abdomen and pelvis near complete atelectasis of the right lower lobe and segmental atelectasis of the left lower lobe with likely mucous plugging of the bilateral lower lobe airways. Underlying pneumonia is not excluded. Incompletely imaged cutaneous/subcutaneous thickening along the right inferior gluteal cleft and extending into the right perineum and towards the right perianal soft tissue, which is of uncertain etiology. The imaged portion measures 4.4 x 3.4 x 2.2 cm. No discrete fluid collection is visualized. Moderate colonic stool burden with mild distention of the rectum, likely reflecting constipation. No evidence of bowel obstruction.  During ICU stay patient was hemodynamically unstable requiring vasopressor support. TOV attempted however failed and Viera catheter reinserted, restarted on BPH medication. Patient respiratory status improved and was extubated. Patient downgraded to SCU for further medical management.

## 2022-07-20 NOTE — PROGRESS NOTE ADULT - SUBJECTIVE AND OBJECTIVE BOX
Patient is a 62y old  Male who presents with a chief complaint of Acute respiratory failure with hypercapnia     (19 Jul 2022 12:03)    INTERVAL HISTORY/ OVERNIGHT EVENTS:       PRESSORS: [ ] YES [ ] NO  WHICH:    Antimicrobial:  cefepime   IVPB 1000 milliGRAM(s) IV Intermittent every 24 hours    Cardiovascular:  midodrine 10 milliGRAM(s) Oral every 8 hours  norepinephrine Infusion 0.1 MICROgram(s)/kG/Min IV Continuous <Continuous>  tamsulosin 0.4 milliGRAM(s) Oral at bedtime    Pulmonary:  ALBUTerol    90 MICROgram(s) HFA Inhaler 2 Puff(s) Inhalation every 6 hours PRN  albuterol/ipratropium for Nebulization 3 milliLiter(s) Nebulizer every 6 hours    Hematalogic:  heparin   Injectable 5000 Unit(s) SubCutaneous every 8 hours    Other:  acetaminophen     Tablet .. 650 milliGRAM(s) Oral every 6 hours PRN  buPROPion XL (24-Hour) . 150 milliGRAM(s) Oral daily  calcitriol   Capsule 0.25 MICROGram(s) Oral daily  chlorhexidine 2% Cloths 2 Application(s) Topical two times a day  diVALproex  milliGRAM(s) Oral <User Schedule>  ferrous    sulfate 325 milliGRAM(s) Oral daily  folic acid 1 milliGRAM(s) Oral daily  levothyroxine 125 MICROGram(s) Oral daily  mupirocin 2% Ointment 1 Application(s) Both Nostrils two times a day  pantoprazole  Injectable 40 milliGRAM(s) IV Push daily  predniSONE   Tablet 40 milliGRAM(s) Oral daily  risperiDONE   Tablet 2 milliGRAM(s) Oral daily  sevelamer carbonate 800 milliGRAM(s) Oral every 8 hours    acetaminophen     Tablet .. 650 milliGRAM(s) Oral every 6 hours PRN  ALBUTerol    90 MICROgram(s) HFA Inhaler 2 Puff(s) Inhalation every 6 hours PRN  albuterol/ipratropium for Nebulization 3 milliLiter(s) Nebulizer every 6 hours  buPROPion XL (24-Hour) . 150 milliGRAM(s) Oral daily  calcitriol   Capsule 0.25 MICROGram(s) Oral daily  cefepime   IVPB 1000 milliGRAM(s) IV Intermittent every 24 hours  chlorhexidine 2% Cloths 2 Application(s) Topical two times a day  diVALproex  milliGRAM(s) Oral <User Schedule>  ferrous    sulfate 325 milliGRAM(s) Oral daily  folic acid 1 milliGRAM(s) Oral daily  heparin   Injectable 5000 Unit(s) SubCutaneous every 8 hours  levothyroxine 125 MICROGram(s) Oral daily  midodrine 10 milliGRAM(s) Oral every 8 hours  mupirocin 2% Ointment 1 Application(s) Both Nostrils two times a day  norepinephrine Infusion 0.1 MICROgram(s)/kG/Min IV Continuous <Continuous>  pantoprazole  Injectable 40 milliGRAM(s) IV Push daily  predniSONE   Tablet 40 milliGRAM(s) Oral daily  risperiDONE   Tablet 2 milliGRAM(s) Oral daily  sevelamer carbonate 800 milliGRAM(s) Oral every 8 hours  tamsulosin 0.4 milliGRAM(s) Oral at bedtime    Drug Dosing Weight  Height (cm): 172.7 (17 Jul 2022 00:32)  Weight (kg): 126.8 (17 Jul 2022 10:20)  BMI (kg/m2): 42.5 (17 Jul 2022 10:20)  BSA (m2): 2.36 (17 Jul 2022 10:20)    CENTRAL LINE: [ ] YES [ ] NO  LOCATION:     HATFIELD: [ ] YES [ ] NO      A-LINE:  [ ] YES [ ] NO  LOCATION:         ICU Vital Signs Last 24 Hrs  T(C): 36.1 (20 Jul 2022 04:00), Max: 36.7 (19 Jul 2022 20:00)  T(F): 96.9 (20 Jul 2022 04:00), Max: 98 (19 Jul 2022 20:00)  HR: 81 (20 Jul 2022 06:00) (62 - 110)  BP: 119/46 (20 Jul 2022 06:00) (85/46 - 188/165)  BP(mean): 57 (20 Jul 2022 06:00) (51 - 171)  ABP: --  ABP(mean): --  RR: 19 (20 Jul 2022 06:00) (11 - 33)  SpO2: 99% (20 Jul 2022 06:00) (82% - 100%)    O2 Parameters below as of 20 Jul 2022 06:00  Patient On (Oxygen Delivery Method): nasal cannula            ABG - ( 19 Jul 2022 03:33 )  pH, Arterial: 7.30  pH, Blood: x     /  pCO2: 57    /  pO2: 171   / HCO3: 28    / Base Excess: 0.4   /  SaO2: 99                    07-19 @ 07:01  -  07-20 @ 07:00  --------------------------------------------------------  IN: 917 mL / OUT: 2475 mL / NET: -1558 mL            PHYSICAL EXAM:    GENERAL: NAD, well-groomed, well-developed  EYES: EOMI, PERRLA,   NECK: Supple, No JVD; Normal thyroid; Trachea midline  NERVOUS SYSTEM:  Alert & Oriented X3,  Motor Strength 5/5 B/L upper and lower extremities; DTRs 2+ intact and symmetric  CHEST/LUNG: No rales, rhonchi, wheezing   HEART: Regular rate and rhythm; No murmurs,   ABDOMEN: Soft, Nontender, Nondistended; Bowel sounds present  EXTREMITIES:  2+ Peripheral Pulses, No clubbing, cyanosis, or edema      LABS:                        7.7    7.68  )-----------( 268      ( 20 Jul 2022 03:58 )             25.0     07-20    143  |  109<H>  |  91<H>  ----------------------------<  105<H>  4.3   |  28  |  4.66<H>    Ca    9.9      20 Jul 2022 03:58  Phos  5.6     07-20  Mg     3.5     07-20    TPro  7.1  /  Alb  2.5<L>  /  TBili  0.2  /  DBili  x   /  AST  13  /  ALT  14  /  AlkPhos  79  07-20        CAPILLARY BLOOD GLUCOSE        Culture Results:   Normal Respiratory Ivelisse present (07-18 @ 06:18)  Culture Results:   No growth (07-17 @ 14:56)  Culture Results:   No growth to date. (07-17 @ 14:46)  Culture Results:   No growth to date. (07-17 @ 14:46)      RADIOLOGY & ADDITIONAL STUDIES REVIEWED:  ***     Patient is a 62y old  Male who presents with a chief complaint of Acute respiratory failure with hypercapnia     (19 Jul 2022 12:03)    INTERVAL HISTORY/ OVERNIGHT EVENTS:   Overnight, pt refusing to wear BIPAP mask, states it is uncomfortable and does not want to wear it. Otherwise denies any other acute complaints.     PRESSORS: [ ] YES [X] NO      Antimicrobial:  cefepime   IVPB 1000 milliGRAM(s) IV Intermittent every 24 hours    Cardiovascular:  midodrine 10 milliGRAM(s) Oral every 8 hours  norepinephrine Infusion 0.1 MICROgram(s)/kG/Min IV Continuous <Continuous>  tamsulosin 0.4 milliGRAM(s) Oral at bedtime    Pulmonary:  ALBUTerol    90 MICROgram(s) HFA Inhaler 2 Puff(s) Inhalation every 6 hours PRN  albuterol/ipratropium for Nebulization 3 milliLiter(s) Nebulizer every 6 hours    Hematalogic:  heparin   Injectable 5000 Unit(s) SubCutaneous every 8 hours    Other:  acetaminophen     Tablet .. 650 milliGRAM(s) Oral every 6 hours PRN  buPROPion XL (24-Hour) . 150 milliGRAM(s) Oral daily  calcitriol   Capsule 0.25 MICROGram(s) Oral daily  chlorhexidine 2% Cloths 2 Application(s) Topical two times a day  diVALproex  milliGRAM(s) Oral <User Schedule>  ferrous    sulfate 325 milliGRAM(s) Oral daily  folic acid 1 milliGRAM(s) Oral daily  levothyroxine 125 MICROGram(s) Oral daily  mupirocin 2% Ointment 1 Application(s) Both Nostrils two times a day  pantoprazole  Injectable 40 milliGRAM(s) IV Push daily  predniSONE   Tablet 40 milliGRAM(s) Oral daily  risperiDONE   Tablet 2 milliGRAM(s) Oral daily  sevelamer carbonate 800 milliGRAM(s) Oral every 8 hours    acetaminophen     Tablet .. 650 milliGRAM(s) Oral every 6 hours PRN  ALBUTerol    90 MICROgram(s) HFA Inhaler 2 Puff(s) Inhalation every 6 hours PRN  albuterol/ipratropium for Nebulization 3 milliLiter(s) Nebulizer every 6 hours  buPROPion XL (24-Hour) . 150 milliGRAM(s) Oral daily  calcitriol   Capsule 0.25 MICROGram(s) Oral daily  cefepime   IVPB 1000 milliGRAM(s) IV Intermittent every 24 hours  chlorhexidine 2% Cloths 2 Application(s) Topical two times a day  diVALproex  milliGRAM(s) Oral <User Schedule>  ferrous    sulfate 325 milliGRAM(s) Oral daily  folic acid 1 milliGRAM(s) Oral daily  heparin   Injectable 5000 Unit(s) SubCutaneous every 8 hours  levothyroxine 125 MICROGram(s) Oral daily  midodrine 10 milliGRAM(s) Oral every 8 hours  mupirocin 2% Ointment 1 Application(s) Both Nostrils two times a day  norepinephrine Infusion 0.1 MICROgram(s)/kG/Min IV Continuous <Continuous>  pantoprazole  Injectable 40 milliGRAM(s) IV Push daily  predniSONE   Tablet 40 milliGRAM(s) Oral daily  risperiDONE   Tablet 2 milliGRAM(s) Oral daily  sevelamer carbonate 800 milliGRAM(s) Oral every 8 hours  tamsulosin 0.4 milliGRAM(s) Oral at bedtime    Drug Dosing Weight  Height (cm): 172.7 (17 Jul 2022 00:32)  Weight (kg): 126.8 (17 Jul 2022 10:20)  BMI (kg/m2): 42.5 (17 Jul 2022 10:20)  BSA (m2): 2.36 (17 Jul 2022 10:20)    CENTRAL LINE: [ ] YES [X] NO  LOCATION:     HATFIELD: [X ] YES [ ] NO      A-LINE:  [ ] YES [X ] NO  LOCATION:         ICU Vital Signs Last 24 Hrs  T(C): 36.1 (20 Jul 2022 04:00), Max: 36.7 (19 Jul 2022 20:00)  T(F): 96.9 (20 Jul 2022 04:00), Max: 98 (19 Jul 2022 20:00)  HR: 81 (20 Jul 2022 06:00) (62 - 110)  BP: 119/46 (20 Jul 2022 06:00) (85/46 - 188/165)  BP(mean): 57 (20 Jul 2022 06:00) (51 - 171)  ABP: --  ABP(mean): --  RR: 19 (20 Jul 2022 06:00) (11 - 33)  SpO2: 99% (20 Jul 2022 06:00) (82% - 100%)    O2 Parameters below as of 20 Jul 2022 06:00  Patient On (Oxygen Delivery Method): nasal cannula            ABG - ( 19 Jul 2022 03:33 )  pH, Arterial: 7.30  pH, Blood: x     /  pCO2: 57    /  pO2: 171   / HCO3: 28    / Base Excess: 0.4   /  SaO2: 99                    07-19 @ 07:01  -  07-20 @ 07:00  --------------------------------------------------------  IN: 917 mL / OUT: 2475 mL / NET: -1558 mL            PHYSICAL EXAM:  GENERAL: NAD obese elderly male  EYES: EOMI, PERRLA  NECK: Supple, No JVD; Normal thyroid; Trachea midline: No LAD   NERVOUS SYSTEM:  Alert & Oriented X3,  Motor Strength 5/5 B/L upper and lower extremities; DTRs 2+ intact and symmetric  CHEST/LUNG: No rales, rhonchi, wheezing, breath sounds present bilaterally  HEART: Regular rate and rhythm; No murmurs, no gallops  ABDOMEN: Soft, Nontender, Nondistended; Bowel sounds present, no pain or masses on palpation  : voiding well, Hatfield in place  EXTREMITIES:  2+ Peripheral Pulses, No clubbing, cyanosis, or edema  SKIN: warm, intact, chronic venous stasis changes on B/L LE      LABS:                        7.7    7.68  )-----------( 268      ( 20 Jul 2022 03:58 )             25.0     07-20    143  |  109<H>  |  91<H>  ----------------------------<  105<H>  4.3   |  28  |  4.66<H>    Ca    9.9      20 Jul 2022 03:58  Phos  5.6     07-20  Mg     3.5     07-20    TPro  7.1  /  Alb  2.5<L>  /  TBili  0.2  /  DBili  x   /  AST  13  /  ALT  14  /  AlkPhos  79  07-20        CAPILLARY BLOOD GLUCOSE        Culture Results:   Normal Respiratory Ivelisse present (07-18 @ 06:18)  Culture Results:   No growth (07-17 @ 14:56)  Culture Results:   No growth to date. (07-17 @ 14:46)  Culture Results:   No growth to date. (07-17 @ 14:46)      RADIOLOGY & ADDITIONAL STUDIES REVIEWED:  ***

## 2022-07-20 NOTE — PROGRESS NOTE ADULT - SUBJECTIVE AND OBJECTIVE BOX
GAYLA PEARCE    SCU progress note    INTERVAL HPI/OVERNIGHT EVENTS: *** Patient seen and examined at bedside. Patient downgraded to SCU from ICU today.     DNR [ ]   DNI  [  ] FULL CODE    Covid - 19 PCR: 7/17/22 negative    The 4Ms    What Matters Most: see GOC  Age appropriate Medications/Screen for High Risk Medication: Yes  Mentation: see CAM below  Mobility: defer to physical exam    The Confusion Assessment Method (CAM) Diagnostic Algorithm     1: Acute Onset or Fluctuating Course  - Is there evidence of an acute change in mental status from the patient’s baseline? Did the (abnormal) behavior  fluctuate during the day, that is, tend to come and go, or increase and decrease in severity?  [ ] YES [x ] NO     2: Inattention  - Did the patient have difficulty focusing attention, being easily distractible, or having difficulty keeping track of what was being said?  [ ] YES [ x] NO     3: Disorganized thinking  -Was the patient’s thinking disorganized or incoherent, such as rambling or irrelevant conversation, unclear or illogical flow of ideas, or unpredictable switching from subject to subject?  [ ] YES [x ] NO    4: Altered Level of consciousness?  [ ] YES [x ] NO    The diagnosis of delirium by CAM requires the presence of features 1 and 2 and either 3 or 4.    PRESSORS: [ ] YES [x ] NO  cefepime   IVPB 1000 milliGRAM(s) IV Intermittent every 24 hours    Cardiovascular:  Heart Failure  Acute   Acute on Chronic  Chronic       midodrine 10 milliGRAM(s) Oral every 8 hours  tamsulosin 0.4 milliGRAM(s) Oral at bedtime    Pulmonary:  ALBUTerol    90 MICROgram(s) HFA Inhaler 2 Puff(s) Inhalation every 6 hours PRN  albuterol/ipratropium for Nebulization 3 milliLiter(s) Nebulizer every 6 hours    Hematalogic:  aspirin enteric coated 81 milliGRAM(s) Oral daily  heparin   Injectable 5000 Unit(s) SubCutaneous every 8 hours    Other:  acetaminophen     Tablet .. 650 milliGRAM(s) Oral every 6 hours PRN  buPROPion XL (24-Hour) . 150 milliGRAM(s) Oral daily  calcitriol   Capsule 0.25 MICROGram(s) Oral daily  diVALproex  milliGRAM(s) Oral <User Schedule>  ferrous    sulfate 325 milliGRAM(s) Oral daily  finasteride 5 milliGRAM(s) Oral daily  folic acid 1 milliGRAM(s) Oral daily  levothyroxine 125 MICROGram(s) Oral daily  lidocaine   4% Patch 1 Patch Transdermal daily  mupirocin 2% Ointment 1 Application(s) Both Nostrils two times a day  pantoprazole    Tablet 40 milliGRAM(s) Oral before breakfast  predniSONE   Tablet 40 milliGRAM(s) Oral daily  risperiDONE   Tablet 2 milliGRAM(s) Oral daily  sevelamer carbonate 800 milliGRAM(s) Oral every 8 hours    acetaminophen     Tablet .. 650 milliGRAM(s) Oral every 6 hours PRN  ALBUTerol    90 MICROgram(s) HFA Inhaler 2 Puff(s) Inhalation every 6 hours PRN  albuterol/ipratropium for Nebulization 3 milliLiter(s) Nebulizer every 6 hours  aspirin enteric coated 81 milliGRAM(s) Oral daily  buPROPion XL (24-Hour) . 150 milliGRAM(s) Oral daily  calcitriol   Capsule 0.25 MICROGram(s) Oral daily  cefepime   IVPB 1000 milliGRAM(s) IV Intermittent every 24 hours  diVALproex  milliGRAM(s) Oral <User Schedule>  ferrous    sulfate 325 milliGRAM(s) Oral daily  finasteride 5 milliGRAM(s) Oral daily  folic acid 1 milliGRAM(s) Oral daily  heparin   Injectable 5000 Unit(s) SubCutaneous every 8 hours  levothyroxine 125 MICROGram(s) Oral daily  lidocaine   4% Patch 1 Patch Transdermal daily  midodrine 10 milliGRAM(s) Oral every 8 hours  mupirocin 2% Ointment 1 Application(s) Both Nostrils two times a day  pantoprazole    Tablet 40 milliGRAM(s) Oral before breakfast  predniSONE   Tablet 40 milliGRAM(s) Oral daily  risperiDONE   Tablet 2 milliGRAM(s) Oral daily  sevelamer carbonate 800 milliGRAM(s) Oral every 8 hours  tamsulosin 0.4 milliGRAM(s) Oral at bedtime    Drug Dosing Weight  Height (cm): 172.7 (17 Jul 2022 00:32)  Weight (kg): 126.8 (17 Jul 2022 10:20)  BMI (kg/m2): 42.5 (17 Jul 2022 10:20)  BSA (m2): 2.36 (17 Jul 2022 10:20)    CENTRAL LINE: [ ] YES [x ] NO  LOCATION:   DATE INSERTED:  REMOVE: [ ] YES [ ] NO  EXPLAIN:    HATFIELD: [ x] YES [ ] NO    DATE INSERTED:7/19/22  REMOVE:  [ ] YES [x ] NO  EXPLAIN: urinary retention    PAST MEDICAL & SURGICAL HISTORY:  Hypothyroid      Hyperlipidemia      Ambulatory dysfunction      Anemia      History of BPH      CKD (chronic kidney disease)      Chronic obstructive pulmonary disease (COPD)      Bipolar disorder      Mood disorder      HLD (hyperlipidemia)      BPH (benign prostatic hyperplasia)      Anemia      No significant past surgical history      ABG - ( 19 Jul 2022 03:33 )  pH, Arterial: 7.30  pH, Blood: x     /  pCO2: 57    /  pO2: 171   / HCO3: 28    / Base Excess: 0.4   /  SaO2: 99              07-19 @ 07:01  -  07-20 @ 07:00  --------------------------------------------------------  IN: 917 mL / OUT: 2535 mL / NET: -1618 mL      PHYSICAL EXAM:    GENERAL: NAD, well-groomed, well-developed  HEAD:  Atraumatic, Normocephalic  EYES: EOMI, PERRLA, conjunctiva and sclera clear  ENMT: No tonsillar erythema, exudates, or enlargement; Moist mucous membranes, Good dentition, No lesions  NECK: Supple, No JVD, Normal thyroid  NERVOUS SYSTEM:  Alert & Oriented X3, Good concentration; generalized weakness  CHEST/LUNG: right upper back slight expiratory wheezing, clear throughout rest of lung field  HEART: Regular rate and rhythm; No murmurs, rubs, or gallops  ABDOMEN: obese, Soft, Nontender, Nondistended; Bowel sounds present  EXTREMITIES: bilateral lower leg edema, dark discoloration  2+ Peripheral Pulses  LYMPH: No lymphadenopathy noted  SKIN: right ischial wound, Pressure injury stage 1 to bilateral heels and sacral      LABS:  CBC Full  -  ( 20 Jul 2022 03:58 )  WBC Count : 7.68 K/uL  RBC Count : 2.47 M/uL  Hemoglobin : 7.7 g/dL  Hematocrit : 25.0 %  Platelet Count - Automated : 268 K/uL  Mean Cell Volume : 101.2 fl  Mean Cell Hemoglobin : 31.2 pg  Mean Cell Hemoglobin Concentration : 30.8 gm/dL  Auto Neutrophil # : 6.12 K/uL  Auto Lymphocyte # : 0.79 K/uL  Auto Monocyte # : 0.69 K/uL  Auto Eosinophil # : 0.00 K/uL  Auto Basophil # : 0.01 K/uL  Auto Neutrophil % : 79.7 %  Auto Lymphocyte % : 10.3 %  Auto Monocyte % : 9.0 %  Auto Eosinophil % : 0.0 %  Auto Basophil % : 0.1 %    07-20    143  |  109<H>  |  91<H>  ----------------------------<  105<H>  4.3   |  28  |  4.66<H>    Ca    9.9      20 Jul 2022 03:58  Phos  5.6     07-20  Mg     3.5     07-20    TPro  7.1  /  Alb  2.5<L>  /  TBili  0.2  /  DBili  x   /  AST  13  /  ALT  14  /  AlkPhos  79  07-20    [  ]  DVT Prophylaxis  [  ]  Nutrition, Brand, Rate         Goal Rate        Abnormal Nutritional Status -  Malnutrition   Cachexia      Morbid Obesity BMI >/=40    RADIOLOGY & ADDITIONAL STUDIES:  ***  < from: Xray Chest 1 View- PORTABLE-Urgent (Xray Chest 1 View- PORTABLE-Urgent .) (07.19.22 @ 10:05) >  FINDINGS:  CATHETERS AND TUBES: None    PULMONARY: Residual RIGHT basilar airspace consolidation obscuring   diaphragm contour.  Mild bilateral perihilar diffuse airspace disease/. No pneumothorax.    HEART/VASCULAR:    The  heart is enlarged in transverse diameter.       BONES: Visualized osseous structures are intact.    IMPRESSION:   Residual RIGHT basilar airspace consolidation. LEFT   perihilar/basilar airspace disease.  .    --- End of Report ---    < end of copied text >  < from: US Duplex Venous Lower Ext Complete, Bilateral (07.18.22 @ 12:01) >  FINDINGS:    RIGHT:  Normal compressibility of the RIGHT common femoral, femoral and popliteal   veins.  Doppler examination shows normal spontaneous and phasic flow.  No RIGHT calf vein thrombosis is detected.    LEFT:  Normal compressibility of the LEFT common femoral, femoral and popliteal   veins.  Doppler examination shows normal spontaneous and phasic flow.  No LEFT calf vein thrombosis is detected.    IMPRESSION:  No evidence of deep venous thrombosis in either lower extremity.          --- End of Report ---    < end of copied text >  < from: CT Abdomen and Pelvis No Cont (07.17.22 @ 09:51) >  FINDINGS:  Limited evaluation of the vasculature and viscera in the absence of   intravenous contrast.    CHEST:  LUNGS AND LARGE AIRWAYS: Motion degraded. Endotracheal tube with tip   above the lesley. Likely mucous plugging in the bilateral lower lobe   airways. Near complete atelectasis of the right lower lobe. Segmental   atelectasis within the left lower lobe. Subsegmental atelectasis within  the right upper lobe, middle lobe, and left upper lobe. Mild biapical   paraseptal emphysema.  PLEURA: No pleural effusion.  VESSELS: Atherosclerotic changes. Coronary artery calcifications.  HEART: Mild cardiomegaly. No pericardial effusion. Aortic valve   calcification.  MEDIASTINUM AND CARLOS: No lymphadenopathy.  CHEST WALL AND LOWER NECK: Heterogeneous thyroid gland with coarse   calcifications.    ABDOMEN AND PELVIS:  LIVER: Within normal limits.  BILE DUCTS: Normal caliber.  GALLBLADDER: Cholecystectomy.  SPLEEN: Within normal limits.  PANCREAS: Within normal limits.  ADRENALS: Within normal limits.  KIDNEYS/URETERS: Bilateral nonspecific perinephric stranding. No   hydronephrosis. No renal calculi.    BLADDER: Underdistended with Hatfield catheter in place and expected   intravesicular air.  REPRODUCTIVE ORGANS: The prostate is not enlarged.    BOWEL: Moderate colonic stool burden. The rectum is distended with stool.   No evidence of bowel obstruction. Colonic diverticula without evidence of   acute diverticulitis. Appendix is normal.  PERITONEUM: No ascites.  VESSELS: Atherosclerotic changes. IVC filter.  RETROPERITONEUM/LYMPH NODES: Nonspecific small left para-aortic and left   common iliac lymph nodes, with example measuring 1.8 x 1.6 cm (series 2   image 219).  ABDOMINAL WALL: Small bilateral fat-containing inguinal hernias.   Incompletely imaged focal cutaneous/subcutaneous thickening along the   right inferior intergluteal cleft extending into the right perineum and   towards the right perianal soft tissue, the imaged portion measuring 4.4   x 3.4 x 2.2 cm (series 2 image 328, series 8 image 127). The imaged   portion demonstrate soft tissue density without discrete fluid collection   identified.  BONES: Degenerative changes, with severe osteoarthritic changes of the   right hip and remodeling of the right femoral head.    IMPRESSION:  Near complete atelectasis of the right lower lobe and segmental   atelectasis of the left lower lobe with likely mucous pluggingof the   bilateral lower lobe airways. Underlying pneumonia is not excluded.    Incompletely imaged cutaneous/subcutaneous thickening along the right   inferior gluteal cleft and extending into the right perineum and towards   the right perianal softtissue, which is of uncertain etiology. The   imaged portion measures 4.4 x 3.4 x 2.2 cm. No discrete fluid collection   is visualized.    Moderate colonic stool burden with mild distention of the rectum, likely   reflecting constipation. No evidenceof bowel obstruction.        --- End of Report ---    < end of copied text >  < from: CT Head No Cont (07.17.22 @ 09:51) >  FINDINGS:  There is no CT evidence of acute transcortical infarct.    There is no hydrocephalus, mass effect, or acute intracranial hemorrhage.   No extra-axial collection. Basal cisterns are patent.    The visualized paranasal sinuses and mastoid air cells are clear.    The calvarium is intact.    Bilateral cataract surgery.    IMPRESSION:  No evidence of acute transcortical infarct, acute intracranial   hemorrhage, or mass effect.    --- End of Report ---    < end of copied text >    Goals of Care Discussion with Family/Proxy/Other   - see note from 7/20/22

## 2022-07-20 NOTE — PROGRESS NOTE ADULT - ATTENDING COMMENTS
62 years old Morbidly obese male from [facility] ambulates with walker has PMHx of COPD not on O2 at home, hypothyroidism, anemia, left DVT, CKD, BPH, HLD, bipolar disorder, ambulatory dysfunction, lymphedema presented with shortness of breath and unresponsiveness The patient was sent here due to sweaty and shaky reaction to taking new Zocor pill. Patient was intubated in ED and ICU was called for Acute hypoxia and hypercapnic respiratory failure. As per triage note patient endorsed shortness of breath.     Assessment:  1. Acute hypoxic and hypercapnic respiratory failure   2. Severe sepsis  3. Aspiration pneumonia   4. COPD   5. Acute encephalopathy  6. Perianal soft tissue bleeding  7. Bipolar disorder  8. Chronic venous statis changes of lower extremities   9. Acute renal failure on CKD   10. Hyperlipidemia   11. BPH   12. Class 3 obesity    Plan:  - Extubated 7/18  - Cont. Nocturnal BiPAP support  - CT head noted with out acute pathology   - Empiric antibiotics for possible aspiration   - Follow up cultures, including sputum culture  - viral panel negative  - S/p IV fluid resuscitation in the ED  - Cont. home psych meds  - Cont. bronchodilators  - Prednisone taper as no evidence of wheezing on exam  - US of lower extremities with out DVT  - Failed void trial, daley reinserted   - Taper vasopressors  - PT evaluation  - OOB to chair

## 2022-07-20 NOTE — PROGRESS NOTE ADULT - ASSESSMENT
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     Active Issues:  1. Acute hypoxic and hypercapnic respiratory failure   2. Severe sepsis  3. Aspiration pneumonia   4. COPD   5. Acute encephalopathy  6. Perianal soft tissue bleeding  7. Bipolar disorder  8. Chronic venous statis changes of lower extremities   9. Acute renal failure on CKD   10. Hyperlipidemia   11. BPH   12. Class 3 obesity    ====Neuro====  #Acute Metabolic Encephalopathy   - Likely secondary to hypercapnia.   - Baseline of A+Ox2 - 3 resides at assisted living.   - Off all sedating medications.   - Currently at baseline mentation now that hypercapnia has resolved.     #Rule Out: Seizure  - Had episode of generalized shaking in ED and received Ativan.   - Continue home dose of Divalproex 500mg BID.     ====CVS====  #Hypotension   - Likely due to sedation, was on low dose Levophed.   - Wean off, start Midodrine if needed.   - TTE in February without acute findings.     ====Pulm====  #Acute hypoxia hypercapnic respiratory failure   - Likely due to TEJ/COPD and non-adherence to CPAP   - Inital ABG showed CO2 >125  - Resolved after intubation/ventilation.   - Tolerated PS trial, extubated 7/18.   - Continue home COPD medications.   - On NC, not on O2 at assisted living, wean as tolerated.     ====GI====  #Nutrition  - Diet as tolerated    ====Renal====  #NAKI on CKD   - Non-oliguric, likely pre-renal.   - Monitor for resolution.  - Attempted Viera removal and trial of void, however had retention.   - Viera replaced, on Flomax, re-attempt in a few days.     ====ID====  #Empiric  - On empiric course of Cefepime due to initial concern for sepsis.   - If no infectious indicators can discontinue.     ====Heme/Onc====  VTE Prophylaxis   - Heparin 5000 q8.   - History of left leg DVT in chart but unable to find positive ultrasound study in EMR.   - Repeat performed on 7/18, no DVT noted.   - Not on full AC at home.     ====Endo====  #bG goal: 140-180    ====Skin/lines====  #Rule Out: Perianal Abcess  - Noted to have perianal skin bleeding in ED requiring sutute.   - CT Abdomen pelvis with skin thickening but no collection.   - Evaluated by surgery Dr. Rojas.      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     Active Issues:  1. Acute hypoxic and hypercapnic respiratory failure   2. Severe sepsis  3. Aspiration pneumonia   4. COPD   5. Acute encephalopathy  6. Perianal soft tissue bleeding  7. Bipolar disorder  8. Chronic venous statis changes of lower extremities   9. Acute renal failure on CKD   10. Hyperlipidemia   11. BPH   12. Class 3 obesity    ====Neuro====  #Acute Metabolic Encephalopathy   - Likely secondary to hypercapnia.   - Baseline of A+Ox2 - 3 resides at assisted living.   - Off all sedating medications.   - Currently at baseline mentation now that hypercapnia has resolved.     #Rule Out: Seizure  - Had episode of generalized shaking in ED and received Ativan.   - Continue home dose of Divalproex 500mg BID.     ====CVS====  #Hypotension   - Likely due to sedation, was on low dose Levophed.   - Off Levophed, start Midodrine if needed.   - TTE in February without acute findings.     ====Pulm====  #Acute hypoxia hypercapnic respiratory failure   - Likely due to TEJ/COPD and non-adherence to CPAP   - Inital ABG showed CO2 >125  - Resolved after intubation/ventilation.   - Tolerated PS trial, extubated 7/18.   - Continue home COPD medications.   - On NC, not on O2 at assisted living, wean as tolerated.     ====GI====  #Nutrition  - Diet as tolerated    ====Renal====  #NAKI on CKD   - Non-oliguric, likely pre-renal.   - Monitor for resolution.  - Attempted Viera removal and trial of void, however had retention.   - Viera replaced, on Flomax, re-attempt in a few days.     ====ID====  #Empiric  - On empiric course of Cefepime due to initial concern for sepsis.   - c/w cefepime day 4 out of 5    ====Heme/Onc====  VTE Prophylaxis   - Heparin 5000 q8.   - History of left leg DVT in chart but unable to find positive ultrasound study in EMR.   - Repeat performed on 7/18, no DVT noted.   - Not on full AC at home.     ====Endo====  #bG goal: 140-180    ====Skin/lines====  #Rule Out: Perianal Abcess  - Noted to have perianal skin bleeding in ED requiring sutute.   - CT Abdomen pelvis with skin thickening but no collection.   - Evaluated by surgery Dr. Rojas.

## 2022-07-20 NOTE — PROGRESS NOTE ADULT - PROBLEM SELECTOR PLAN 6
secondary anemia of chronic disease  no active bleeding currently   PRBC not given during this admission  transfuse as appropriate

## 2022-07-20 NOTE — PROGRESS NOTE ADULT - PROBLEM SELECTOR PLAN 7
SHEYLA on CKD stage 4  likely due to prerenal due to hypotension in ED  baseline 3.8   Creatinine 4.66  consider nephrology consult if Creatinine continues to uptrend  will need outpatient follow up

## 2022-07-20 NOTE — PHARMACOTHERAPY INTERVENTION NOTE - COMMENTS
Pantoprazole was changed from IV to PO by the ICU team due to patient's oral diet.
Recommended changing frequency of cefepime 1gm from Q12hrs to Q24hrs due to patient's current renal function.

## 2022-07-20 NOTE — CHART NOTE - NSCHARTNOTEFT_GEN_A_CORE
63 yo from [facility], history of morbid obesity, bph, ckd, and bipolar disorder, hypothyroidism lymphedema, COPD, and TEJ on QHS CPAP presented to  on 7/17 for an episode of "shaking a sweating" after taking Zocor. Pt has history of left leg DVT in chart but unable to find positive ultrasound study in EMR. Repeat performed on 7/18, no DVT noted. Not on full AC at home. While awaiting disposition became unresponsive secondary to acute hypercapnic respiratory failure requiring intubation, also noted to have perianal bleeding. Noted to have perianal skin bleeding in ED requiring sutures. CT Abdomen pelvis with skin thickening but no collection. Evaluated by surgery Dr. Rojas, no acute intervention at this time. In ED, had episode of generalized shaking, received Ativan.    ICU Course:  Patient was intubated in ED and admitted to ICU acute hypoxia respiratory failure likely secondary to COPD exacerbation. Initial ABG showed CO2 >125. Resolved after intubation/ventilation. Pt was hypotensive, started on pressors. Pt extubated 7/18. Started on BIPAP but pt refusing to wear mask. Saturating well on NC. Attempted Viera removal and trial of void, however had retention. Viera replaced, on Flomax, re-attempt in a few days. On empiric course of Cefepime due to initial concern for sepsis. c/w cefepime for 5 days total.    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 Pt has history of left leg DVT in chart but unable to find positive ultrasound study in EMR. Repeat performed on 7/18, no DVT noted. Not on full AC at home. While awaiting disposition became unresponsive secondary to acute hypercapnic respiratory failure requiring intubation, also noted to have perianal bleeding. Noted to have perianal skin bleeding in ED requiring sutures. CT Abdomen pelvis with skin thickening but no collection. Evaluated by surgery Dr. Rojas, no acute intervention at this time. In ED, had episode of generalized shaking, received Ativan.    ICU Course:  Patient was intubated in ED and admitted to ICU acute hypoxia respiratory failure likely secondary to COPD exacerbation. Initial ABG showed CO2 >125. Resolved after intubation/ventilation. Pt was hypotensive, started on pressors. Pt extubated 7/18. Pt on prednisone taper for COPD exacerbation, no wheezing on exam. Started on BIPAP but pt refusing to wear mask. Saturating well on NC. Attempted Viera removal and trial of void, however had retention. Viera replaced, on Flomax, re-attempt in a few days. On empiric course of Cefepime due to initial concern for sepsis. c/w cefepime for 5 days total.    To Follow:  - complete cefepime tomorrow 7/21  - c/w Prednisone taper      - prednisone 40mg day 2 of 3      - taper schedule: 30mg x3days, 20mg x 3days, 10mg x 3days  - f/u blood cultures (final reports)    Pt transferred to [facility]. 63 yo from [facility], history of morbid obesity, bph, ckd, and bipolar disorder, hypothyroidism lymphedema, COPD, and TEJ on QHS CPAP presented to  on 7/17 for an episode of "shaking a sweating" after taking Zocor. Pt has history of left leg DVT in chart but unable to find positive ultrasound study in EMR. Repeat performed on 7/18, no DVT noted. Not on full AC at home. While awaiting disposition became unresponsive secondary to acute hypercapnic respiratory failure requiring intubation, also noted to have perianal bleeding. Noted to have perianal skin bleeding in ED requiring sutures. CT Abdomen pelvis with skin thickening but no collection. Evaluated by surgery Dr. Rojas, no acute intervention at this time. In ED, had episode of generalized shaking, received Ativan.    ICU Course:  Patient was intubated in ED and admitted to ICU acute hypoxia respiratory failure likely secondary to COPD exacerbation. Initial ABG showed CO2 >125. Resolved after intubation/ventilation. Pt was hypotensive, started on pressors. Pt extubated 7/18. Pt on prednisone taper for COPD exacerbation, no wheezing on exam. Started on BIPAP but pt refusing to wear mask. Saturating well on NC. Attempted Viera removal and trial of void, however had retention. Viera replaced, on Flomax, re-attempt in a few days. On empiric course of Cefepime due to initial concern for sepsis. c/w cefepime for 5 days total.    To Follow:  - complete cefepime tomorrow 7/21  - c/w Prednisone taper      - prednisone 40mg day 2 of 3      - taper schedule: 30mg x3days, 20mg x 3days, 10mg x 3days  - f/u blood cultures (final reports)    Pt transferred to [address]. Signed out to GARY Oliveros and Dr. Morin.

## 2022-07-21 NOTE — PROGRESS NOTE ADULT - ASSESSMENT
Patient is a 62 years old male from [facility], with PMH of COPD/TEJ- CPAP at night, CHF Grade 1 LV diastolic dysfunction, HLD, PAD, CKD 4, anemia of chronic disease, hypothyroidism, lymphedema,  Chronic Back Pain, OP, Polyarthritis, peripheral neuropathy. Patient presented to ED from assisted living after taking Zocor developed sweats and shakes, was to return to assisted living, however ED course complicated after patient became unresponsive for CO2 retention from COPD. Patient was emergently mechanically intubated and sedated in ED. ICU consulted. Patient admitted to ICU for Acute Hypoxic Respiratory Failure Secondary to COPD exacerbation. Surgery consulted for perianal bleeding from punctate site, unexplained, no trauma. Suture placed in ED.   CT chest, abdomen and pelvis near complete atelectasis of the right lower lobe and segmental atelectasis of the left lower lobe with likely mucous plugging of the bilateral lower lobe airways. Underlying pneumonia is not excluded. Incompletely imaged cutaneous/subcutaneous thickening along the right inferior gluteal cleft and extending into the right perineum and towards the right perianal soft tissue, which is of uncertain etiology. The imaged portion measures 4.4 x 3.4 x 2.2 cm. No discrete fluid collection is visualized. Moderate colonic stool burden with mild distention of the rectum, likely reflecting constipation. No evidence of bowel obstruction.  During ICU stay patient was hemodynamically unstable requiring vasopressor support. TOV attempted however failed and Viera catheter reinserted, restarted on BPH medication. Patient respiratory status improved and was extubated.   Patient downgraded to SCU for further medical management.  on Prednisone taper. given last dose antibiotic today.   PT recs TG.

## 2022-07-21 NOTE — CHART NOTE - NSCHARTNOTEFT_GEN_A_CORE
Pt HCP [facility] not available, [place].   spoke with sister Daija Mcrae at [facility] (C.P. [phone]) and updated pt condition, including test result, prognosis, discharge plan.   answered all questions. no further questions are addressed.

## 2022-07-21 NOTE — PROGRESS NOTE ADULT - PROBLEM SELECTOR PLAN 4
continue daley catheter consider TOV after 3 days  continue Flomax  restarted Proscar continue daley catheter consider TOV after 3 days  continue Flomax  restarted Proscar  TOV 7/22

## 2022-07-21 NOTE — PROGRESS NOTE ADULT - PROBLEM SELECTOR PLAN 8
lymphedema with PAD  Doppler negative for DVT  Lasix held due to SHEYLA lymphedema with PAD  b/l lower leg discoloration, dry scaly scabs, no open wound  Doppler negative for DVT  Lasix held due to SHEYLA

## 2022-07-21 NOTE — PROGRESS NOTE ADULT - SUBJECTIVE AND OBJECTIVE BOX
Patient is a 62y old  Male who presents with a chief complaint of AHRF due to COPD, aspiration PNA (21 Jul 2022 10:29)    PATIENT IS SEEN AND EXAMINED IN MEDICAL FLOOR.  SOWMYA [    ]    ALYSIA [   ]      GT [   ]    ALLERGIES:  No Known Allergies      Daily     Daily     VITALS:    Vital Signs Last 24 Hrs  T(C): 36.6 (21 Jul 2022 05:12), Max: 37.6 (20 Jul 2022 16:57)  T(F): 97.8 (21 Jul 2022 05:12), Max: 99.6 (20 Jul 2022 16:57)  HR: 85 (21 Jul 2022 05:12) (85 - 109)  BP: 119/57 (21 Jul 2022 05:12) (108/49 - 138/61)  BP(mean): 81 (20 Jul 2022 15:17) (50 - 81)  RR: 18 (21 Jul 2022 05:12) (12 - 23)  SpO2: 95% (21 Jul 2022 08:42) (93% - 100%)    Parameters below as of 21 Jul 2022 05:12  Patient On (Oxygen Delivery Method): nasal cannula  O2 Flow (L/min): 2      LABS:    CBC Full  -  ( 21 Jul 2022 09:30 )  WBC Count : 7.68 K/uL  RBC Count : 2.31 M/uL  Hemoglobin : 7.4 g/dL  Hematocrit : 24.1 %  Platelet Count - Automated : 250 K/uL  Mean Cell Volume : 104.3 fl  Mean Cell Hemoglobin : 32.0 pg  Mean Cell Hemoglobin Concentration : 30.7 gm/dL  Auto Neutrophil # : x  Auto Lymphocyte # : x  Auto Monocyte # : x  Auto Eosinophil # : x  Auto Basophil # : x  Auto Neutrophil % : x  Auto Lymphocyte % : x  Auto Monocyte % : x  Auto Eosinophil % : x  Auto Basophil % : x      07-21    147<H>  |  111<H>  |  97<H>  ----------------------------<  x   4.1   |  27  |  4.06<H>    Ca    10.0      21 Jul 2022 07:14  Phos  3.8     07-21  Mg     3.3     07-21    TPro  7.1  /  Alb  2.5<L>  /  TBili  0.2  /  DBili  x   /  AST  13  /  ALT  14  /  AlkPhos  79  07-20    CAPILLARY BLOOD GLUCOSE            LIVER FUNCTIONS - ( 20 Jul 2022 03:58 )  Alb: 2.5 g/dL / Pro: 7.1 g/dL / ALK PHOS: 79 U/L / ALT: 14 U/L DA / AST: 13 U/L / GGT: x           Creatinine Trend: 4.06<--, 4.66<--, 5.03<--, 5.20<--, 6.03<--, 5.68<--  I&O's Summary    20 Jul 2022 07:01  -  21 Jul 2022 07:00  --------------------------------------------------------  IN: 240 mL / OUT: 2300 mL / NET: -2060 mL    21 Jul 2022 07:01  -  21 Jul 2022 10:54  --------------------------------------------------------  IN: 0 mL / OUT: 1300 mL / NET: -1300 mL            ET Tube ET Tube  07-18 @ 06:18   Normal Respiratory Ivelisse present  --    No polymorphonuclear leukocytes seen per low power field  Moderate Squamous epithelial cells seen per low power field  Moderate Yeast like cells seen per oil power field  Few Gram positive cocci in pairs seen per oil power field  Rare Gram PositiveRods seen per oil power field  Rare Gram Negative Rods seen per oil power field  Rare Gram Negative Diplococci seen per oil power field      Clean Catch Clean Catch (Midstream)  07-17 @ 14:56   No growth  --  --      .Blood Blood-Peripheral  07-17 @ 14:46   No growth to date.  --  --          MEDICATIONS:    MEDICATIONS  (STANDING):  albuterol/ipratropium for Nebulization 3 milliLiter(s) Nebulizer every 6 hours  buPROPion XL (24-Hour) . 150 milliGRAM(s) Oral daily  calcitriol   Capsule 0.25 MICROGram(s) Oral daily  cefepime   IVPB 1000 milliGRAM(s) IV Intermittent every 24 hours  dextrose 5% + sodium chloride 0.45%. 1000 milliLiter(s) (60 mL/Hr) IV Continuous <Continuous>  diVALproex  milliGRAM(s) Oral <User Schedule>  ferrous    sulfate 325 milliGRAM(s) Oral daily  finasteride 5 milliGRAM(s) Oral daily  folic acid 1 milliGRAM(s) Oral daily  heparin   Injectable 5000 Unit(s) SubCutaneous every 8 hours  levothyroxine 125 MICROGram(s) Oral daily  lidocaine   4% Patch 1 Patch Transdermal daily  midodrine 10 milliGRAM(s) Oral every 8 hours  mupirocin 2% Ointment 1 Application(s) Both Nostrils two times a day  pantoprazole    Tablet 40 milliGRAM(s) Oral before breakfast  polyethylene glycol 3350 17 Gram(s) Oral daily  risperiDONE   Tablet 2 milliGRAM(s) Oral daily  senna 2 Tablet(s) Oral at bedtime  sevelamer carbonate 800 milliGRAM(s) Oral every 8 hours  tamsulosin 0.4 milliGRAM(s) Oral at bedtime      MEDICATIONS  (PRN):  acetaminophen     Tablet .. 650 milliGRAM(s) Oral every 6 hours PRN Mild Pain (1 - 3), Moderate Pain (4 - 6)  ALBUTerol    90 MICROgram(s) HFA Inhaler 2 Puff(s) Inhalation every 6 hours PRN Shortness of Breath and/or Wheezing      REVIEW OF SYSTEMS:                           ALL ROS DONE [ X   ]    CONSTITUTIONAL:  LETHARGIC [   ], FEVER [   ], UNRESPONSIVE [   ]  CVS:  CP  [   ], SOB, [   ], PALPITATIONS [   ], DIZZYNESS [   ]  RS: COUGH [   ], SPUTUM [   ]  GI: ABDOMINAL PAIN [   ], NAUSEA [   ], VOMITINGS [   ], DIARRHEA [   ], CONSTIPATION [   ]  :  DYSURIA [   ], NOCTURIA [   ], INCREASED FREQUENCY [   ], DRIBLING [   ],  SKELETAL: PAINFUL JOINTS [   ], SWOLLEN JOINTS [   ], NECK ACHE [   ], LOW BACK ACHE [   ],  SKIN : ULCERS [   ], RASH [   ], ITCHING [   ]  CNS: HEAD ACHE [   ], DOUBLE VISION [   ], BLURRED VISION [   ], AMS / CONFUSION [   ], SEIZURES [   ], WEAKNESS [   ],TINGLING / NUMBNESS [   ]    PHYSICAL EXAMINATION:  GENERAL APPEARANCE: NO DISTRESS  HEENT:  NO PALLOR, NO  JVD,  NO   NODES, NECK SUPPLE  CVS: S1 +, S2 +,   RS: AEEB,  OCCASIONAL  RALES +,   NO RONCHI  ABD: SOFT, NT, NO, BS +  EXT: NO PE  SKIN: WARM,   SKELETAL:  ROM ACCEPTABLE  CNS:  AAO X 2    RADIOLOGY :    ACC: [id number] EXAM:  XR CHEST PORTABLE URGENT 1V                        ACC: [id number] EXAM:  XR CHEST PORTABLE URGENT 1V                          PROCEDURE DATE:  07/17/2022          INTERPRETATION:  Portable chest radiograph    CLINICAL INFORMATION: Dyspnea, shortness of breath.    TECHNIQUE:  Portable  AP chest radiograph.    COMPARISON: 9/1/2021 chest .    FINDINGS:  CATHETERS AND TUBES: ET tube tip above tracheal bifurcation. NG tube tip   coiled within the hypopharynx. PULMONARY: LEFT basilar airspace disease   obscures diaphragm contour. Is mild vascular congestion.  .   No pneumothorax.    HEART/VASCULAR: The  heart is enlarged in transverse diameter. .    BONES: Visualized osseous structures are intact.    IMPRESSION:   LEFT basilarairspace disease.  ET tube tip above tracheal bifurcation.  NG tube coiled within the hypopharynx..    Follow-up AP portable chest radiograph 7/17/2022 at 12:59 PM:  ET tube tip above tracheal bifurcation.  NG tube tip beyond GE junction.  Residual LEFT basilar airspace consolidation and/or effusion obscuring   diaphragm contour. RIGHT basilar mild diffuse airspace disease and linear   atelectasis. Upper zones clear.  Cardiomegaly.      ASSESSMENT :       PLAN:  HPI:  Patient is AAOx0, intubated; So hx was obtained from previous charts and NH paperwork    Patient 62 years old Morbid obese male from [facility] ambulates with walker has PMHx of bph, ckd, and bipolar disorder, COPD not on O2 at home, hypothyroidism, anemia, left DVT, CKD, BPH, HLD, bipolar disorder, ambulatory dysfunction, lymphedema presented with shortness of breath and unresponsivness. The patient was sent here due to sweaty and shaky reaction to taking new Zocor pill. Patient was intubated in ED and ICU was called for Acute hypoxia respiratory failure likely secondary to COPD exacerbation.  (17 Jul 2022 11:15)      # ACUTE HYPOXIC RESPIRATORY FAILURE W/ UNDERLYING COPD/TEJ W/ SUSPECTED ASPIRATION PNEUMONITIS  - S/P MECHANICAL VENTILATION  - ON NC  - ON BRONCHODILATORS, SYMBICORT  - ON CEFEPIME, F/U BCX  - CRITICAL CARE CONSULT IN PROGRESS    # ACUTE METABOLIC ENCEPHALOPATHY LIKELY S/T HYPERCAPNIA  - R/O SEIZURE - F/U EEG  - ON HOME DIVALPROEX    # SEPSIS S/T ASPIRATION PNEUMONITIS  - EMPIRICALLY ON CEFEPIME  - S/P VASOPRESSORS  - F/U BCX   - CRITICAL CARE CONSULT IN PROGRESS    # SHEYLA ON CKD  # BPH , URINARY RETENTION  - MONITORING CR  - AVOID NEPHROTOXIC AGENTS    - FAILED TOV, PLACED RE-PLACED HATFIELD    # PERIANAL SUBCUTANEOUS TISSUE BLEEDING  - CT A/P REVIEWED  - SURGERY CONSULT IN PROGRESS    # CLASS III OBESITY    # GI AND DVT PPX   Patient is a 62y old  Male who presents with a chief complaint of AHRF due to COPD, aspiration PNA (21 Jul 2022 10:29)    PATIENT IS SEEN AND EXAMINED IN MEDICAL FLOOR.    ALLERGIES:  No Known Allergies    VITALS:    Vital Signs Last 24 Hrs  T(C): 36.6 (21 Jul 2022 05:12), Max: 37.6 (20 Jul 2022 16:57)  T(F): 97.8 (21 Jul 2022 05:12), Max: 99.6 (20 Jul 2022 16:57)  HR: 85 (21 Jul 2022 05:12) (85 - 109)  BP: 119/57 (21 Jul 2022 05:12) (108/49 - 138/61)  BP(mean): 81 (20 Jul 2022 15:17) (50 - 81)  RR: 18 (21 Jul 2022 05:12) (12 - 23)  SpO2: 95% (21 Jul 2022 08:42) (93% - 100%)    Parameters below as of 21 Jul 2022 05:12  Patient On (Oxygen Delivery Method): nasal cannula  O2 Flow (L/min): 2      LABS:    CBC Full  -  ( 21 Jul 2022 09:30 )  WBC Count : 7.68 K/uL  RBC Count : 2.31 M/uL  Hemoglobin : 7.4 g/dL  Hematocrit : 24.1 %  Platelet Count - Automated : 250 K/uL  Mean Cell Volume : 104.3 fl  Mean Cell Hemoglobin : 32.0 pg  Mean Cell Hemoglobin Concentration : 30.7 gm/dL  Auto Neutrophil # : x  Auto Lymphocyte # : x  Auto Monocyte # : x  Auto Eosinophil # : x  Auto Basophil # : x  Auto Neutrophil % : x  Auto Lymphocyte % : x  Auto Monocyte % : x  Auto Eosinophil % : x  Auto Basophil % : x      07-21    147<H>  |  111<H>  |  97<H>  ----------------------------<  x   4.1   |  27  |  4.06<H>    Ca    10.0      21 Jul 2022 07:14  Phos  3.8     07-21  Mg     3.3     07-21    TPro  7.1  /  Alb  2.5<L>  /  TBili  0.2  /  DBili  x   /  AST  13  /  ALT  14  /  AlkPhos  79  07-20    CAPILLARY BLOOD GLUCOSE            LIVER FUNCTIONS - ( 20 Jul 2022 03:58 )  Alb: 2.5 g/dL / Pro: 7.1 g/dL / ALK PHOS: 79 U/L / ALT: 14 U/L DA / AST: 13 U/L / GGT: x           Creatinine Trend: 4.06<--, 4.66<--, 5.03<--, 5.20<--, 6.03<--, 5.68<--  I&O's Summary    20 Jul 2022 07:01  -  21 Jul 2022 07:00  --------------------------------------------------------  IN: 240 mL / OUT: 2300 mL / NET: -2060 mL    21 Jul 2022 07:01  -  21 Jul 2022 10:54  --------------------------------------------------------  IN: 0 mL / OUT: 1300 mL / NET: -1300 mL            ET Tube ET Tube  07-18 @ 06:18   Normal Respiratory Ivelisse present  --    No polymorphonuclear leukocytes seen per low power field  Moderate Squamous epithelial cells seen per low power field  Moderate Yeast like cells seen per oil power field  Few Gram positive cocci in pairs seen per oil power field  Rare Gram PositiveRods seen per oil power field  Rare Gram Negative Rods seen per oil power field  Rare Gram Negative Diplococci seen per oil power field      Clean Catch Clean Catch (Midstream)  07-17 @ 14:56   No growth  --  --      .Blood Blood-Peripheral  07-17 @ 14:46   No growth to date.  --  --          MEDICATIONS:    MEDICATIONS  (STANDING):  albuterol/ipratropium for Nebulization 3 milliLiter(s) Nebulizer every 6 hours  buPROPion XL (24-Hour) . 150 milliGRAM(s) Oral daily  calcitriol   Capsule 0.25 MICROGram(s) Oral daily  cefepime   IVPB 1000 milliGRAM(s) IV Intermittent every 24 hours  dextrose 5% + sodium chloride 0.45%. 1000 milliLiter(s) (60 mL/Hr) IV Continuous <Continuous>  diVALproex  milliGRAM(s) Oral <User Schedule>  ferrous    sulfate 325 milliGRAM(s) Oral daily  finasteride 5 milliGRAM(s) Oral daily  folic acid 1 milliGRAM(s) Oral daily  heparin   Injectable 5000 Unit(s) SubCutaneous every 8 hours  levothyroxine 125 MICROGram(s) Oral daily  lidocaine   4% Patch 1 Patch Transdermal daily  midodrine 10 milliGRAM(s) Oral every 8 hours  mupirocin 2% Ointment 1 Application(s) Both Nostrils two times a day  pantoprazole    Tablet 40 milliGRAM(s) Oral before breakfast  polyethylene glycol 3350 17 Gram(s) Oral daily  risperiDONE   Tablet 2 milliGRAM(s) Oral daily  senna 2 Tablet(s) Oral at bedtime  sevelamer carbonate 800 milliGRAM(s) Oral every 8 hours  tamsulosin 0.4 milliGRAM(s) Oral at bedtime      MEDICATIONS  (PRN):  acetaminophen     Tablet .. 650 milliGRAM(s) Oral every 6 hours PRN Mild Pain (1 - 3), Moderate Pain (4 - 6)  ALBUTerol    90 MICROgram(s) HFA Inhaler 2 Puff(s) Inhalation every 6 hours PRN Shortness of Breath and/or Wheezing      REVIEW OF SYSTEMS:                           ALL ROS DONE [ X   ]    CONSTITUTIONAL:  LETHARGIC [   ], FEVER [   ], UNRESPONSIVE [   ]  CVS:  CP  [   ], SOB, [   ], PALPITATIONS [   ], DIZZYNESS [   ]  RS: COUGH [   ], SPUTUM [   ]  GI: ABDOMINAL PAIN [   ], NAUSEA [   ], VOMITINGS [   ], DIARRHEA [   ], CONSTIPATION [   ]  :  DYSURIA [   ], NOCTURIA [   ], INCREASED FREQUENCY [   ], DRIBLING [   ],  SKELETAL: PAINFUL JOINTS [   ], SWOLLEN JOINTS [   ], NECK ACHE [   ], LOW BACK ACHE [   ],  SKIN : ULCERS [   ], RASH [   ], ITCHING [   ]  CNS: HEAD ACHE [   ], DOUBLE VISION [   ], BLURRED VISION [   ], AMS / CONFUSION [   ], SEIZURES [   ], WEAKNESS [   ],TINGLING / NUMBNESS [   ]    PHYSICAL EXAMINATION:  GENERAL APPEARANCE: NO DISTRESS  HEENT:  NO PALLOR, NO  JVD,  NO   NODES, NECK SUPPLE  CVS: S1 +, S2 +,   RS: AEEB,  OCCASIONAL  RALES +,   NO RONCHI  ABD: SOFT, NT, NO, BS +  EXT: NO PE  SKIN: WARM,   SKELETAL:  ROM ACCEPTABLE  CNS:  AAO X 2    RADIOLOGY :    ACC: [id number] EXAM:  XR CHEST PORTABLE URGENT 1V                        ACC: [id number] EXAM:  XR CHEST PORTABLE URGENT 1V                          PROCEDURE DATE:  07/17/2022          INTERPRETATION:  Portable chest radiograph    CLINICAL INFORMATION: Dyspnea, shortness of breath.    TECHNIQUE:  Portable  AP chest radiograph.    COMPARISON: 9/1/2021 chest .    FINDINGS:  CATHETERS AND TUBES: ET tube tip above tracheal bifurcation. NG tube tip   coiled within the hypopharynx. PULMONARY: LEFT basilar airspace disease   obscures diaphragm contour. Is mild vascular congestion.  .   No pneumothorax.    HEART/VASCULAR: The  heart is enlarged in transverse diameter. .    BONES: Visualized osseous structures are intact.    IMPRESSION:   LEFT basilarairspace disease.  ET tube tip above tracheal bifurcation.  NG tube coiled within the hypopharynx..    Follow-up AP portable chest radiograph 7/17/2022 at 12:59 PM:  ET tube tip above tracheal bifurcation.  NG tube tip beyond GE junction.  Residual LEFT basilar airspace consolidation and/or effusion obscuring   diaphragm contour. RIGHT basilar mild diffuse airspace disease and linear   atelectasis. Upper zones clear.  Cardiomegaly.      ASSESSMENT :       PLAN:  HPI:  Patient is AAOx0, intubated; So hx was obtained from previous charts and NH paperwork    Patient 62 years old Morbid obese male from [facility] ambulates with walker has PMHx of bph, ckd, and bipolar disorder, COPD not on O2 at home, hypothyroidism, anemia, left DVT, CKD, BPH, HLD, bipolar disorder, ambulatory dysfunction, lymphedema presented with shortness of breath and unresponsivness. The patient was sent here due to sweaty and shaky reaction to taking new Zocor pill. Patient was intubated in ED and ICU was called for Acute hypoxia respiratory failure likely secondary to COPD exacerbation.  (17 Jul 2022 11:15)    # CASE DISCUSSED AT LENGTH WITH SISTER JAMES AT BEDSIDE. ALL QUESTIONS ANSWERED. PATIENT AND SISTER AGREEABLE FOR DISCHARGE TO [facility] FOR SUBACUTE REHAB.     # ACUTE HYPOXIC RESPIRATORY FAILURE W/ UNDERLYING COPD/TEJ W/ SUSPECTED ASPIRATION PNEUMONITIS  - S/P MECHANICAL VENTILATION  - ON NC  - ON BRONCHODILATORS, SYMBICORT  - ON CEFEPIME, F/U BCX  - CRITICAL CARE CONSULT IN PROGRESS    # ACUTE METABOLIC ENCEPHALOPATHY LIKELY S/T HYPERCAPNIA  - R/O SEIZURE - F/U EEG  - ON HOME DIVALPROEX    # SEPSIS S/T ASPIRATION PNEUMONITIS  - EMPIRICALLY ON CEFEPIME  - S/P VASOPRESSORS  - F/U BCX   - CRITICAL CARE CONSULT IN PROGRESS    # SHEYLA ON CKD  # BPH , URINARY RETENTION  - MONITORING CR  - AVOID NEPHROTOXIC AGENTS    - FAILED TOV, PLACED RE-PLACED HATFIELD    # PERIANAL SUBCUTANEOUS TISSUE BLEEDING  - CT A/P REVIEWED  - SURGERY CONSULT IN PROGRESS    # CLASS III OBESITY    # GI AND DVT PPX

## 2022-07-21 NOTE — PROGRESS NOTE ADULT - SUBJECTIVE AND OBJECTIVE BOX
GAYLA PEARCE    SCU progress note    INTERVAL HPI/OVERNIGHT EVENTS: seen at bedside, no overnight event, no complains. eats very well.     DNR [ ]   DNI  [  ] FULL cod [x]    Covid - 19 PCR:  negative 7/17    The 4Ms    What Matters Most: see GOC  Age appropriate Medications/Screen for High Risk Medication: Yes  Mentation: see CAM below  Mobility: defer to physical exam    The Confusion Assessment Method (CAM) Diagnostic Algorithm     1: Acute Onset or Fluctuating Course  - Is there evidence of an acute change in mental status from the patient’s baseline? Did the (abnormal) behavior  fluctuate during the day, that is, tend to come and go, or increase and decrease in severity?  [ ] YES [x ] NO     2: Inattention  - Did the patient have difficulty focusing attention, being easily distractible, or having difficulty keeping track of what was being said?  [ ] YES [x ] NO     3: Disorganized thinking  -Was the patient’s thinking disorganized or incoherent, such as rambling or irrelevant conversation, unclear or illogical flow of ideas, or unpredictable switching from subject to subject?  [ ] YES [ x] NO    4: Altered Level of consciousness?  [ ] YES [x ] NO    The diagnosis of delirium by CAM requires the presence of features 1 and 2 and either 3 or 4.    PRESSORS: [ ] YES [x ] NO  cefepime   IVPB 1000 milliGRAM(s) IV Intermittent every 24 hours    Cardiovascular:  Heart Failure  Acute   Acute on Chronic  Chronic       midodrine 10 milliGRAM(s) Oral every 8 hours  tamsulosin 0.4 milliGRAM(s) Oral at bedtime    Pulmonary:  ALBUTerol    90 MICROgram(s) HFA Inhaler 2 Puff(s) Inhalation every 6 hours PRN  albuterol/ipratropium for Nebulization 3 milliLiter(s) Nebulizer every 6 hours    Hematalogic:  heparin   Injectable 5000 Unit(s) SubCutaneous every 8 hours    Other:  acetaminophen     Tablet .. 650 milliGRAM(s) Oral every 6 hours PRN  buPROPion XL (24-Hour) . 150 milliGRAM(s) Oral daily  calcitriol   Capsule 0.25 MICROGram(s) Oral daily  dextrose 5% + sodium chloride 0.45%. 1000 milliLiter(s) IV Continuous <Continuous>  diVALproex  milliGRAM(s) Oral <User Schedule>  ferrous    sulfate 325 milliGRAM(s) Oral daily  finasteride 5 milliGRAM(s) Oral daily  folic acid 1 milliGRAM(s) Oral daily  levothyroxine 125 MICROGram(s) Oral daily  lidocaine   4% Patch 1 Patch Transdermal daily  mupirocin 2% Ointment 1 Application(s) Both Nostrils two times a day  pantoprazole    Tablet 40 milliGRAM(s) Oral before breakfast  polyethylene glycol 3350 17 Gram(s) Oral daily  risperiDONE   Tablet 2 milliGRAM(s) Oral daily  senna 2 Tablet(s) Oral at bedtime  sevelamer carbonate 800 milliGRAM(s) Oral every 8 hours    acetaminophen     Tablet .. 650 milliGRAM(s) Oral every 6 hours PRN  ALBUTerol    90 MICROgram(s) HFA Inhaler 2 Puff(s) Inhalation every 6 hours PRN  albuterol/ipratropium for Nebulization 3 milliLiter(s) Nebulizer every 6 hours  buPROPion XL (24-Hour) . 150 milliGRAM(s) Oral daily  calcitriol   Capsule 0.25 MICROGram(s) Oral daily  cefepime   IVPB 1000 milliGRAM(s) IV Intermittent every 24 hours  dextrose 5% + sodium chloride 0.45%. 1000 milliLiter(s) IV Continuous <Continuous>  diVALproex  milliGRAM(s) Oral <User Schedule>  ferrous    sulfate 325 milliGRAM(s) Oral daily  finasteride 5 milliGRAM(s) Oral daily  folic acid 1 milliGRAM(s) Oral daily  heparin   Injectable 5000 Unit(s) SubCutaneous every 8 hours  levothyroxine 125 MICROGram(s) Oral daily  lidocaine   4% Patch 1 Patch Transdermal daily  midodrine 10 milliGRAM(s) Oral every 8 hours  mupirocin 2% Ointment 1 Application(s) Both Nostrils two times a day  pantoprazole    Tablet 40 milliGRAM(s) Oral before breakfast  polyethylene glycol 3350 17 Gram(s) Oral daily  risperiDONE   Tablet 2 milliGRAM(s) Oral daily  senna 2 Tablet(s) Oral at bedtime  sevelamer carbonate 800 milliGRAM(s) Oral every 8 hours  tamsulosin 0.4 milliGRAM(s) Oral at bedtime    Drug Dosing Weight  Height (cm): 172.7 (17 Jul 2022 00:32)  Weight (kg): 126.8 (17 Jul 2022 10:20)  BMI (kg/m2): 42.5 (17 Jul 2022 10:20)  BSA (m2): 2.36 (17 Jul 2022 10:20)    CENTRAL LINE: [ ] YES [x ] NO  LOCATION:   DATE INSERTED:  REMOVE: [ ] YES [ ] NO  EXPLAIN:    HATFIELD: [ x] YES [ ] NO    DATE INSERTED: failed TOV  REMOVE:  [ ] YES [ ] NO  EXPLAIN:    PAST MEDICAL & SURGICAL HISTORY:  Hypothyroid  Hyperlipidemia  Ambulatory dysfunction  Anemia  History of BPH  CKD (chronic kidney disease)  Chronic obstructive pulmonary disease (COPD)  Bipolar disorder  Mood disorder  HLD (hyperlipidemia)  BPH (benign prostatic hyperplasia)  Anemia  No significant past surgical history    07-20 @ 07:01  -  07-21 @ 07:00  --------------------------------------------------------  IN: 240 mL / OUT: 2300 mL / NET: -2060 mL    PHYSICAL EXAM:    GENERAL: NAD, obese, well groomed  HEAD:  Atraumatic, Normocephalic  EYES: EOMI, PERRLA, conjunctiva and sclera clear  ENMT: No tonsillar erythema, exudates, or enlargement; Moist mucous membranes, Good dentition, No lesions  NECK: Supple, No JVD, Normal thyroid  NERVOUS SYSTEM:  Alert & Oriented X 2-3,  Motor Strength 5/5 B/L upper and lower extremities; DTRs 2+ intact and symmetric  CHEST/LUNG: fair air entry b/l Eupneic on NC oxygen therapy ; No rales, rhonchi, wheezing, or rubs  HEART: Regular rate and rhythm; No murmurs, rubs, or gallops  ABDOMEN: Soft, Nontender, Nondistended; Bowel sounds present  EXTREMITIES:  2+ Peripheral Pulses, No clubbing, cyanosis, or edema  ; Hatfield in place, clear urine output  LYMPH: No lymphadenopathy noted  SKIN: No rashes or lesions, see wound care note, R. Ischial wound  Stage 1 Pressure Injury to the Bilateral Heels and Sacral area, as evident by non-blanchable erythema      LABS:  CBC Full  -  ( 21 Jul 2022 09:30 )  WBC Count : 7.68 K/uL  RBC Count : 2.31 M/uL  Hemoglobin : 7.4 g/dL  Hematocrit : 24.1 %  Platelet Count - Automated : 250 K/uL  Mean Cell Volume : 104.3 fl  Mean Cell Hemoglobin : 32.0 pg  Mean Cell Hemoglobin Concentration : 30.7 gm/dL    07-21    147<H>  |  111<H>  |  97<H>  ----------------------------<  x   4.1   |  27  |  4.06<H>    Ca    10.0      21 Jul 2022 07:14  Phos  3.8     07-21  Mg     3.3     07-21    TPro  7.1  /  Alb  2.5<L>  /  TBili  0.2  /  DBili  x   /  AST  13  /  ALT  14  /  AlkPhos  79  07-20      [  ]  DVT Prophylaxis  [  ]  Nutrition, Brand, Rate         Goal Rate        Abnormal Nutritional Status -  Malnutrition   Cachexia      Morbid Obesity BMI >/=40    RADIOLOGY & ADDITIONAL STUDIES:      < from: Xray Chest 1 View- PORTABLE-Urgent (Xray Chest 1 View- PORTABLE-Urgent .) (07.19.22 @ 10:05) >  IMPRESSION:   Residual RIGHT basilar airspace consolidation. LEFT   perihilar/basilar airspace disease.    < end of copied text >    Goals of Care Discussion with Family/Proxy/Other   - see note from/family meeting set up for...     GAYLA PEARCE    SCU progress note    INTERVAL HPI/OVERNIGHT EVENTS: seen at bedside, no overnight event, no complains. eats very well.     DNR [ ]   DNI  [  ] FULL cod [x]    Covid - 19 PCR:  negative 7/17    The 4Ms    What Matters Most: see GOC  Age appropriate Medications/Screen for High Risk Medication: Yes  Mentation: see CAM below  Mobility: defer to physical exam    The Confusion Assessment Method (CAM) Diagnostic Algorithm     1: Acute Onset or Fluctuating Course  - Is there evidence of an acute change in mental status from the patient’s baseline? Did the (abnormal) behavior  fluctuate during the day, that is, tend to come and go, or increase and decrease in severity?  [ ] YES [x ] NO     2: Inattention  - Did the patient have difficulty focusing attention, being easily distractible, or having difficulty keeping track of what was being said?  [ ] YES [x ] NO     3: Disorganized thinking  -Was the patient’s thinking disorganized or incoherent, such as rambling or irrelevant conversation, unclear or illogical flow of ideas, or unpredictable switching from subject to subject?  [ ] YES [ x] NO    4: Altered Level of consciousness?  [ ] YES [x ] NO    The diagnosis of delirium by CAM requires the presence of features 1 and 2 and either 3 or 4.    PRESSORS: [ ] YES [x ] NO  cefepime   IVPB 1000 milliGRAM(s) IV Intermittent every 24 hours    Cardiovascular:  Heart Failure  Acute   Acute on Chronic  Chronic       midodrine 10 milliGRAM(s) Oral every 8 hours  tamsulosin 0.4 milliGRAM(s) Oral at bedtime    Pulmonary:  ALBUTerol    90 MICROgram(s) HFA Inhaler 2 Puff(s) Inhalation every 6 hours PRN  albuterol/ipratropium for Nebulization 3 milliLiter(s) Nebulizer every 6 hours    Hematalogic:  heparin   Injectable 5000 Unit(s) SubCutaneous every 8 hours    Other:  acetaminophen     Tablet .. 650 milliGRAM(s) Oral every 6 hours PRN  buPROPion XL (24-Hour) . 150 milliGRAM(s) Oral daily  calcitriol   Capsule 0.25 MICROGram(s) Oral daily  dextrose 5% + sodium chloride 0.45%. 1000 milliLiter(s) IV Continuous <Continuous>  diVALproex  milliGRAM(s) Oral <User Schedule>  ferrous    sulfate 325 milliGRAM(s) Oral daily  finasteride 5 milliGRAM(s) Oral daily  folic acid 1 milliGRAM(s) Oral daily  levothyroxine 125 MICROGram(s) Oral daily  lidocaine   4% Patch 1 Patch Transdermal daily  mupirocin 2% Ointment 1 Application(s) Both Nostrils two times a day  pantoprazole    Tablet 40 milliGRAM(s) Oral before breakfast  polyethylene glycol 3350 17 Gram(s) Oral daily  risperiDONE   Tablet 2 milliGRAM(s) Oral daily  senna 2 Tablet(s) Oral at bedtime  sevelamer carbonate 800 milliGRAM(s) Oral every 8 hours    acetaminophen     Tablet .. 650 milliGRAM(s) Oral every 6 hours PRN  ALBUTerol    90 MICROgram(s) HFA Inhaler 2 Puff(s) Inhalation every 6 hours PRN  albuterol/ipratropium for Nebulization 3 milliLiter(s) Nebulizer every 6 hours  buPROPion XL (24-Hour) . 150 milliGRAM(s) Oral daily  calcitriol   Capsule 0.25 MICROGram(s) Oral daily  cefepime   IVPB 1000 milliGRAM(s) IV Intermittent every 24 hours  dextrose 5% + sodium chloride 0.45%. 1000 milliLiter(s) IV Continuous <Continuous>  diVALproex  milliGRAM(s) Oral <User Schedule>  ferrous    sulfate 325 milliGRAM(s) Oral daily  finasteride 5 milliGRAM(s) Oral daily  folic acid 1 milliGRAM(s) Oral daily  heparin   Injectable 5000 Unit(s) SubCutaneous every 8 hours  levothyroxine 125 MICROGram(s) Oral daily  lidocaine   4% Patch 1 Patch Transdermal daily  midodrine 10 milliGRAM(s) Oral every 8 hours  mupirocin 2% Ointment 1 Application(s) Both Nostrils two times a day  pantoprazole    Tablet 40 milliGRAM(s) Oral before breakfast  polyethylene glycol 3350 17 Gram(s) Oral daily  risperiDONE   Tablet 2 milliGRAM(s) Oral daily  senna 2 Tablet(s) Oral at bedtime  sevelamer carbonate 800 milliGRAM(s) Oral every 8 hours  tamsulosin 0.4 milliGRAM(s) Oral at bedtime    Drug Dosing Weight  Height (cm): 172.7 (17 Jul 2022 00:32)  Weight (kg): 126.8 (17 Jul 2022 10:20)  BMI (kg/m2): 42.5 (17 Jul 2022 10:20)  BSA (m2): 2.36 (17 Jul 2022 10:20)    CENTRAL LINE: [ ] YES [x ] NO  LOCATION:   DATE INSERTED:  REMOVE: [ ] YES [ ] NO  EXPLAIN:    HATFIELD: [ x] YES [ ] NO    DATE INSERTED: failed TOV  REMOVE:  [ ] YES [ ] NO  EXPLAIN:    PAST MEDICAL & SURGICAL HISTORY:  Hypothyroid  Hyperlipidemia  Ambulatory dysfunction  Anemia  History of BPH  CKD (chronic kidney disease)  Chronic obstructive pulmonary disease (COPD)  Bipolar disorder  Mood disorder  HLD (hyperlipidemia)  BPH (benign prostatic hyperplasia)  Anemia  No significant past surgical history    07-20 @ 07:01  -  07-21 @ 07:00  --------------------------------------------------------  IN: 240 mL / OUT: 2300 mL / NET: -2060 mL    PHYSICAL EXAM:    GENERAL: NAD, obese, well groomed  HEAD:  Atraumatic, Normocephalic  EYES: EOMI, PERRLA, conjunctiva and sclera clear  ENMT: No tonsillar erythema, exudates, or enlargement; Moist mucous membranes, Good dentition, No lesions  NECK: Supple, No JVD, Normal thyroid  NERVOUS SYSTEM:  Alert & Oriented X 2-3,  Motor Strength 5/5 B/L upper and lower extremities; DTRs 2+ intact and symmetric  CHEST/LUNG: fair air entry b/l Eupneic on NC oxygen therapy ; No rales, rhonchi, wheezing, or rubs  HEART: Regular rate and rhythm; No murmurs, rubs, or gallops  ABDOMEN: Soft, Nontender, Nondistended; Bowel sounds present  EXTREMITIES:  2+ Peripheral Pulses, No clubbing, cyanosis, or edema  ; Hatfield in place, clear urine output  LYMPH: No lymphadenopathy noted  SKIN: No rashes or lesions, see wound care note, R. Ischial wound  Stage 1 Pressure Injury to the Bilateral Heels and Sacral area, as evident by non-blanchable erythema. b/l lower legs brownish discoloration with dry scabs. no open wound.       LABS:  CBC Full  -  ( 21 Jul 2022 09:30 )  WBC Count : 7.68 K/uL  RBC Count : 2.31 M/uL  Hemoglobin : 7.4 g/dL  Hematocrit : 24.1 %  Platelet Count - Automated : 250 K/uL  Mean Cell Volume : 104.3 fl  Mean Cell Hemoglobin : 32.0 pg  Mean Cell Hemoglobin Concentration : 30.7 gm/dL    07-21    147<H>  |  111<H>  |  97<H>  ----------------------------<  x   4.1   |  27  |  4.06<H>    Ca    10.0      21 Jul 2022 07:14  Phos  3.8     07-21  Mg     3.3     07-21    TPro  7.1  /  Alb  2.5<L>  /  TBili  0.2  /  DBili  x   /  AST  13  /  ALT  14  /  AlkPhos  79  07-20      [  ]  DVT Prophylaxis  [  ]  Nutrition, Brand, Rate         Goal Rate        Abnormal Nutritional Status -  Malnutrition   Cachexia      Morbid Obesity BMI >/=40    RADIOLOGY & ADDITIONAL STUDIES:      < from: Xray Chest 1 View- PORTABLE-Urgent (Xray Chest 1 View- PORTABLE-Urgent .) (07.19.22 @ 10:05) >  IMPRESSION:   Residual RIGHT basilar airspace consolidation. LEFT   perihilar/basilar airspace disease.    < end of copied text >    Goals of Care Discussion with Family/Proxy/Other   - see note from/family meeting set up for...

## 2022-07-21 NOTE — PROGRESS NOTE ADULT - PROBLEM SELECTOR PLAN 6
secondary anemia of chronic disease  no active bleeding currently   PRBC not given during this admission  transfuse as appropriate secondary anemia of chronic disease  H/H trending down from 8 to 7.1  no active bleeding currently   PRBC not given during this admission  transfuse as appropriate  Type and screen done 7/21

## 2022-07-21 NOTE — PROGRESS NOTE ADULT - PROBLEM SELECTOR PLAN 9
perianal wound stable  Surgery recommending outpatient follow up, no surgical intervention at this time perianal wound stable  Wound care consulted  Surgery recommending outpatient follow up, no surgical intervention at this time

## 2022-07-21 NOTE — PROGRESS NOTE ADULT - PROBLEM SELECTOR PLAN 12
from [facility]  BiPAP at night  Full Code  PT recs TG  repeat COVID 7/24 Sunday from [facility]  BiPAP at night  Full Code  PT recs TG  will do TOV 7/22.   repeat COVID 7/24 Sunday

## 2022-07-21 NOTE — PROGRESS NOTE ADULT - PROBLEM SELECTOR PLAN 7
SHEYLA on CKD stage 4  likely due to prerenal due to hypotension in ED  baseline 3.8   Creatinine 4.66  consider nephrology consult if Creatinine continues to uptrend  will need outpatient follow up SHEYLA on CKD stage 4  likely due to prerenal due to hypotension in ED  baseline 3.8   Creatinine 4.66-4.06  mild hypernatremia-give free water, low dose IVF x 12hr.   monitor BMP  consider nephrology consult if Creatinine continues to uptrend  will need outpatient follow up

## 2022-07-22 NOTE — CHART NOTE - NSCHARTNOTEFT_GEN_A_CORE
EVENT:   7/21/22, 10:17pm, patient c/o right thing pain level 8/10 and requested medication.   HPI:   62 years old Morbid obese male from [facility] ambulates with walker has PMHx of BPH, CKD, and bipolar disorder, COPD not on O2 at home, hypothyroidism, anemia, left DVT, CKD, BPH, HLD, bipolar disorder, ambulatory dysfunction, lymphedema presented with shortness of breath and unresponsiveness. The patient was sent here due to sweaty and shaky reaction to taking new Zocor pill. Patient was intubated in ED and ICU was called for Acute hypoxia respiratory failure likely secondary to COPD exacerbation.    OBJECTIVE:  Vital Signs Last 24 Hrs  T(C): 36.1 (22 Jul 2022 04:39), Max: 36.9 (21 Jul 2022 11:41)  T(F): 97 (22 Jul 2022 04:39), Max: 98.5 (21 Jul 2022 11:41)  HR: 77 (22 Jul 2022 04:39) (77 - 102)  BP: 145/89 (22 Jul 2022 04:39) (128/53 - 145/89)  BP(mean): --  RR: 19 (22 Jul 2022 04:39) (19 - 20)  SpO2: 97% (22 Jul 2022 04:39) (95% - 99%)    Parameters below as of 22 Jul 2022 04:39  Patient On (Oxygen Delivery Method): nasal cannula  O2 Flow (L/min): 3      FOCUSED PHYSICAL EXAM:    LABS:                        7.4    7.68  )-----------( 250      ( 21 Jul 2022 09:30 )             24.1     07-21    147<H>  |  111<H>  |  97<H>  ----------------------------<  104<H>  4.1   |  27  |  4.06<H>    Ca    10.0      21 Jul 2022 07:14  Phos  3.8     07-21  Mg     3.3     07-2    PLAN:   - Ofirmev (Acetaminophen) 1000 mg IV PB x 1 dose for pain.     FOLLOW UP / RESULT:   - Reassess patient pain level,   - Continue supportive care/ treatment.

## 2022-07-22 NOTE — PROGRESS NOTE ADULT - PROBLEM SELECTOR PLAN 8
lymphedema with PAD  b/l lower leg discoloration, dry scaly scabs, no open wound  Doppler negative for DVT  Lasix held due to SHEYLA

## 2022-07-22 NOTE — DISCHARGE NOTE PROVIDER - CARE PROVIDER_API CALL
Owen Phillips)  Medicine  [address], [address] [place] [address]  Phone: [phone]  Fax: [phone]  Follow Up Time: 1-3 days    Facility MD at rehabilitation,   Phone: (   )    -  Fax: (   )    -  Follow Up Time: 1-3 days    Serafin Cunningham)  Surgery  [address] [facility], [address]  [place] [id number]  Phone: [phone]  Fax: [phone]  Follow Up Time: 2 weeks

## 2022-07-22 NOTE — PROGRESS NOTE ADULT - PROBLEM SELECTOR PLAN 9
perianal wound stable  Wound care consulted  Surgery recommending outpatient follow up, no surgical intervention at this time

## 2022-07-22 NOTE — PROGRESS NOTE ADULT - NS ATTEND AMEND GEN_ALL_CORE FT
Impression: This is a 61 Y/O Male from [facility] with Hx of TEJ on Bipap and COPD. Presented with Shortness of breath and unresponsiveness. Admitted to ICU for Acute hypoxic hypercapnic respiratory failure due to Acute exacerbation of COPD. Got intubated and extubated today and on Bipap.  CT chest show b/l lower lung atelectasis with possible PNA . 07-17-22 Negative RVP and Negative for Covid 19.        Suggestion:  Extubated today. On Bipap 16/8 FIO 40%. Monitor O2 saturation trend.   On Duoneb via nebulization Q 6 Hours.   Please Dcd PRN Albuterol 90 mcg Q 6 hours.   On Cefepime 1 Gm IVPB Daily.   DVT/ GI prophylactic. On Heparin 5,000 Units SQ Q8 Hours.     case discussed with icu team
Problem/Plan - 1:  ·  Problem: Acute respiratory failure with hypoxia and hypercapnia.   ·  Plan: secondary to COPD exacerbation and aspiration pneumonia  continue BiPAP at night and PRN during day  continue cefepime  continue albuterol neb and MDI PRN  continue prednisone taper  blood and urine culture negative.     Problem/Plan - 2:  ·  Problem: Chronic obstructive pulmonary disease (COPD).   ·  Plan: see plan as above  not in exacerbation  will hold nicotine patch- quit smoking 11mns, no patch given this admission. patient denies wanting a cigarette.     Problem/Plan - 3:  ·  Problem: Chronic diastolic heart failure.   ·  Plan: not in exacerbation  Lasix held due to SHEYLA  continue aspirin  hold Zocor due to sweating and shaking.     Problem/Plan - 4:  ·  Problem: BPH (benign prostatic hyperplasia).   ·  Plan: continue daley catheter consider TOV after 3 days  continue Flomax  restarted Proscar  TOV 7/22.     Problem/Plan - 5:  ·  Problem: Hyperlipidemia.   ·  Plan: hold Zocor as patient develop sweat and shaking  will need to follow up outpatient for further recommendations.     Problem/Plan - 6:  ·  Problem: Anemia.   ·  Plan: secondary anemia of chronic disease  H/H trending down from 8 to 7.1  no active bleeding currently   PRBC not given during this admission  transfuse as appropriate  Type and screen done 7/21.     Problem/Plan - 7:  ·  Problem: SHEYLA (acute kidney injury).   ·  Plan: SHEYLA on CKD stage 4  likely due to prerenal due to hypotension in ED  baseline 3.8   mild hypernatremia-give free water, s/p low dose IVF x 12hr.  resolved  Creatinine 4.66--> 3.72 today  monitor BMP  Cr level is trending down   will need outpatient follow up.     Problem/Plan - 8:  ·  Problem: Lymphedema.   ·  Plan: lymphedema with PAD  b/l lower leg discoloration, dry scaly scabs, no open wound  Doppler negative for DVT  Lasix held due to SHEYLA.
Problem/Plan - 1:  ·  Problem: Acute respiratory failure with hypoxia and hypercapnia.   ·  Plan: secondary to COPD exacerbation and aspiration pneumonia  continue BiPAP at night and PRN during day  continue cefepime  continue albuterol neb and MDI PRN  continue prednisone taper  blood and urine culture negative.     Problem/Plan - 2:  ·  Problem: Chronic obstructive pulmonary disease (COPD).   ·  Plan: see plan as above  not in exacerbation  will hold nicotine patch- quit smoking 11mns, no patch given this admission. patient denies wanting a cigarette.     Problem/Plan - 3:  ·  Problem: Chronic diastolic heart failure.   ·  Plan: not in exacerbation  Lasix held due to SHEYLA  continue aspirin  hold Zocor due to sweating and shaking.     Problem/Plan - 4:  ·  Problem: BPH (benign prostatic hyperplasia).   ·  Plan: continue daley catheter consider TOV after 3 days  continue Flomax  restarted Proscar  TOV 7/22.     Problem/Plan - 5:  ·  Problem: Hyperlipidemia.   ·  Plan: hold Zocor as patient develop sweat and shaking  will need to follow up outpatient for further recommendations.     Problem/Plan - 6:  ·  Problem: Anemia.   ·  Plan: secondary anemia of chronic disease  H/H trending down from 8 to 7.1  no active bleeding currently   PRBC not given during this admission  transfuse as appropriate  Type and screen done 7/21.     Problem/Plan - 7:  ·  Problem: SHEYLA (acute kidney injury).   ·  Plan: SHEYLA on CKD stage 4  likely due to prerenal due to hypotension in ED  baseline 3.8   Creatinine 4.66-4.06  mild hypernatremia-give free water, low dose IVF x 12hr.   monitor BMP  consider nephrology consult if Creatinine continues to uptrend  will need outpatient follow up.     Problem/Plan - 8:  ·  Problem: Lymphedema.   ·  Plan: lymphedema with PAD  b/l lower leg discoloration, dry scaly scabs, no open wound  Doppler negative for DVT  Lasix held due to SHEYLA.     Problem/Plan - 9:  ·  Problem: Wound.   ·  Plan: perianal wound stable  Wound care consulted  Surgery recommending outpatient follow up, no surgical intervention at this time.

## 2022-07-22 NOTE — PROGRESS NOTE ADULT - PROBLEM SELECTOR PLAN 7
SHEYLA on CKD stage 4  likely due to prerenal due to hypotension in ED  baseline 3.8   mild hypernatremia-give free water, s/p low dose IVF x 12hr.  resolved  Creatinine 4.66--> 3.72 today  monitor BMP  consider nephrology consult if Creatinine continues to uptrend  will need outpatient follow up

## 2022-07-22 NOTE — PROGRESS NOTE ADULT - SUBJECTIVE AND OBJECTIVE BOX
GAYLA PEARCE    SCU progress note    INTERVAL HPI/OVERNIGHT EVENTS: Seen at bedside, no overnight event no complaints    DNR [ ]   DNI  [  ] Full cod [x]    Covid - 19 PCR:  negative 7/17    The 4Ms    What Matters Most: see GOC  Age appropriate Medications/Screen for High Risk Medication: Yes  Mentation: see CAM below  Mobility: defer to physical exam    The Confusion Assessment Method (CAM) Diagnostic Algorithm     1: Acute Onset or Fluctuating Course  - Is there evidence of an acute change in mental status from the patient’s baseline? Did the (abnormal) behavior  fluctuate during the day, that is, tend to come and go, or increase and decrease in severity?  [ ] YES [x ] NO     2: Inattention  - Did the patient have difficulty focusing attention, being easily distractible, or having difficulty keeping track of what was being said?  [ ] YES [x] NO     3: Disorganized thinking  -Was the patient’s thinking disorganized or incoherent, such as rambling or irrelevant conversation, unclear or illogical flow of ideas, or unpredictable switching from subject to subject?  [ ] YES [x ] NO    4: Altered Level of consciousness?  [ ] YES [ x] NO    The diagnosis of delirium by CAM requires the presence of features 1 and 2 and either 3 or 4.    PRESSORS: [ ] YES [ x] NO  cefepime   IVPB 1000 milliGRAM(s) IV Intermittent every 24 hours    Cardiovascular:  Heart Failure  Acute   Acute on Chronic  Chronic       midodrine 10 milliGRAM(s) Oral every 8 hours  tamsulosin 0.4 milliGRAM(s) Oral at bedtime    Pulmonary:  ALBUTerol    90 MICROgram(s) HFA Inhaler 2 Puff(s) Inhalation every 6 hours PRN  albuterol/ipratropium for Nebulization 3 milliLiter(s) Nebulizer every 6 hours    Hematalogic:  heparin   Injectable 5000 Unit(s) SubCutaneous every 8 hours    Other:  acetaminophen     Tablet .. 650 milliGRAM(s) Oral every 6 hours PRN  buPROPion XL (24-Hour) . 150 milliGRAM(s) Oral daily  calcitriol   Capsule 0.25 MICROGram(s) Oral daily  dextrose 5% + sodium chloride 0.45%. 1000 milliLiter(s) IV Continuous <Continuous>  diVALproex  milliGRAM(s) Oral <User Schedule>  ferrous    sulfate 325 milliGRAM(s) Oral daily  finasteride 5 milliGRAM(s) Oral daily  folic acid 1 milliGRAM(s) Oral daily  levothyroxine 125 MICROGram(s) Oral daily  lidocaine   4% Patch 1 Patch Transdermal daily  mupirocin 2% Ointment 1 Application(s) Both Nostrils two times a day  pantoprazole    Tablet 40 milliGRAM(s) Oral before breakfast  polyethylene glycol 3350 17 Gram(s) Oral daily  predniSONE   Tablet 30 milliGRAM(s) Oral daily  risperiDONE   Tablet 2 milliGRAM(s) Oral daily  senna 2 Tablet(s) Oral at bedtime  sevelamer carbonate 800 milliGRAM(s) Oral every 8 hours    acetaminophen     Tablet .. 650 milliGRAM(s) Oral every 6 hours PRN  ALBUTerol    90 MICROgram(s) HFA Inhaler 2 Puff(s) Inhalation every 6 hours PRN  albuterol/ipratropium for Nebulization 3 milliLiter(s) Nebulizer every 6 hours  buPROPion XL (24-Hour) . 150 milliGRAM(s) Oral daily  calcitriol   Capsule 0.25 MICROGram(s) Oral daily  cefepime   IVPB 1000 milliGRAM(s) IV Intermittent every 24 hours  dextrose 5% + sodium chloride 0.45%. 1000 milliLiter(s) IV Continuous <Continuous>  diVALproex  milliGRAM(s) Oral <User Schedule>  ferrous    sulfate 325 milliGRAM(s) Oral daily  finasteride 5 milliGRAM(s) Oral daily  folic acid 1 milliGRAM(s) Oral daily  heparin   Injectable 5000 Unit(s) SubCutaneous every 8 hours  levothyroxine 125 MICROGram(s) Oral daily  lidocaine   4% Patch 1 Patch Transdermal daily  midodrine 10 milliGRAM(s) Oral every 8 hours  mupirocin 2% Ointment 1 Application(s) Both Nostrils two times a day  pantoprazole    Tablet 40 milliGRAM(s) Oral before breakfast  polyethylene glycol 3350 17 Gram(s) Oral daily  predniSONE   Tablet 30 milliGRAM(s) Oral daily  risperiDONE   Tablet 2 milliGRAM(s) Oral daily  senna 2 Tablet(s) Oral at bedtime  sevelamer carbonate 800 milliGRAM(s) Oral every 8 hours  tamsulosin 0.4 milliGRAM(s) Oral at bedtime    Drug Dosing Weight  Height (cm): 172.7 (17 Jul 2022 00:32)  Weight (kg): 126.8 (17 Jul 2022 10:20)  BMI (kg/m2): 42.5 (17 Jul 2022 10:20)  BSA (m2): 2.36 (17 Jul 2022 10:20)    CENTRAL LINE: [ ] YES [x ] NO  LOCATION:   DATE INSERTED:  REMOVE: [ ] YES [ ] NO  EXPLAIN:    HATFIELD: [x ] YES [ ] NO    DATE INSERTED: 7/17  REMOVE:  [ ] YES [ ] NO  EXPLAIN:    PAST MEDICAL & SURGICAL HISTORY:  Hypothyroid      Hyperlipidemia      Ambulatory dysfunction      Anemia      History of BPH      CKD (chronic kidney disease)      Chronic obstructive pulmonary disease (COPD)      Bipolar disorder      Mood disorder      HLD (hyperlipidemia)      BPH (benign prostatic hyperplasia)      Anemia      No significant past surgical history    07-21 @ 07:01  -  07-22 @ 07:00  --------------------------------------------------------  IN: 720 mL / OUT: 4000 mL / NET: -3280 mL    PHYSICAL EXAM:    GENERAL: NAD, well-groomed,  obese  HEAD:  Atraumatic, Normocephalic  EYES: EOMI, PERRLA, conjunctiva and sclera clear  ENMT: No tonsillar erythema, exudates, or enlargement; Moist mucous membranes, Good dentition, No lesions  NECK: Supple, No JVD, Normal thyroid  NERVOUS SYSTEM:  Alert & Oriented X 2-3,  Motor Strength 5/5 B/L upper and lower extremities; DTRs 2+ intact and symmetric  CHEST/LUNG: fair air entry b/l Eupneic on NC oxygen therapy ; No rales, rhonchi, wheezing, or rubs  HEART: Regular rate and rhythm; No murmurs, rubs, or gallops  ABDOMEN: Soft, Nontender, Nondistended; Bowel sounds present  EXTREMITIES:  2+ Peripheral Pulses, No clubbing, cyanosis, or edema  ; Hatfield in place, clear urine output  LYMPH: No lymphadenopathy noted  SKIN: No rashes or lesions, see wound care note, R. Ischial wound  Stage 1 Pressure Injury to the Bilateral Heels and Sacral area, as evident by non-blanchable erythema. b/l lower legs brownish discoloration with dry scabs. no open wound.     LABS:  CBC Full  -  ( 22 Jul 2022 06:54 )  WBC Count : 7.61 K/uL  RBC Count : 2.32 M/uL  Hemoglobin : 7.3 g/dL  Hematocrit : 23.9 %  Platelet Count - Automated : 235 K/uL  Mean Cell Volume : 103.0 fl  Mean Cell Hemoglobin : 31.5 pg  Mean Cell Hemoglobin Concentration : 30.5 gm/dL       07-22    143  |  108  |  91<H>  ----------------------------<  117<H>  4.3   |  28  |  3.72<H>    Ca    9.9      22 Jul 2022 06:54  Phos  3.6     07-22  Mg     3.0     07-22    TPro  6.5  /  Alb  2.3<L>  /  TBili  0.2  /  DBili  x   /  AST  6<L>  /  ALT  10  /  AlkPhos  73  07-22           [  ]  DVT Prophylaxis  [  ]  Nutrition, Brand, Rate         Goal Rate        Abnormal Nutritional Status -  Malnutrition   Cachexia      Morbid Obesity BMI >/=40    RADIOLOGY & ADDITIONAL STUDIES:   < from: Xray Chest 1 View- PORTABLE-Urgent (Xray Chest 1 View- PORTABLE-Urgent .) (07.19.22 @ 10:05) >  IMPRESSION:   Residual RIGHT basilar airspace consolidation. LEFT   perihilar/basilar airspace disease.    < end of copied text >    < from: CT Abdomen and Pelvis No Cont (07.17.22 @ 09:51) >    ACC: [id number] EXAM:  CT ABDOMEN AND PELVIS                        ACC: [id number] EXAM:  CT CHEST                          PROCEDURE DATE:  07/17/2022          INTERPRETATION:  CLINICAL INFORMATION: Altered mental status. Acute   hypoxic respiratory failure, intubated. Perianal subcutaneous bleeding,   concern for collection.    COMPARISON: CT chest 2/16/2022    CONTRAST/COMPLICATIONS:  IV Contrast: NONE  Oral Contrast: NONE  Complications: None reported at time of study completion    PROCEDURE:  CT of the Chest, Abdomen and Pelvis was performed.  Sagittal and coronal reformats were performed.    FINDINGS:  Limited evaluation of the vasculature and viscera in the absence of   intravenous contrast.    CHEST:  LUNGS AND LARGE AIRWAYS: Motion degraded. Endotracheal tube with tip   above the lesley. Likely mucous plugging in the bilateral lower lobe   airways. Near complete atelectasis of the right lower lobe. Segmental   atelectasis within the left lower lobe. Subsegmental atelectasis within  the right upper lobe, middle lobe, and left upper lobe. Mild biapical   paraseptal emphysema.  PLEURA: No pleural effusion.  VESSELS: Atherosclerotic changes. Coronary artery calcifications.  HEART: Mild cardiomegaly. No pericardial effusion. Aortic valve   calcification.  MEDIASTINUM AND CARLOS: No lymphadenopathy.  CHEST WALL AND LOWER NECK: Heterogeneous thyroid gland with coarse   calcifications.    ABDOMEN AND PELVIS:  LIVER: Within normal limits.  BILE DUCTS: Normal caliber.  GALLBLADDER: Cholecystectomy.  SPLEEN: Within normal limits.  PANCREAS: Within normal limits.  ADRENALS: Within normal limits.  KIDNEYS/URETERS: Bilateral nonspecific perinephric stranding. No   hydronephrosis. No renal calculi.    BLADDER: Underdistended with Hatfield catheter in place and expected   intravesicular air.  REPRODUCTIVE ORGANS: The prostate is not enlarged.    BOWEL: Moderate colonic stool burden. The rectum is distended with stool.   No evidence of bowel obstruction. Colonic diverticula without evidence of   acute diverticulitis. Appendix is normal.  PERITONEUM: No ascites.  VESSELS: Atherosclerotic changes. IVC filter.  RETROPERITONEUM/LYMPH NODES: Nonspecific small left para-aortic and left   common iliac lymph nodes, with example measuring 1.8 x 1.6 cm (series 2   image 219).  ABDOMINAL WALL: Small bilateral fat-containing inguinal hernias.   Incompletely imaged focal cutaneous/subcutaneous thickening along the   right inferior intergluteal cleft extending into the right perineum and   towards the right perianal soft tissue, the imaged portion measuring 4.4   x 3.4 x 2.2 cm (series 2 image 328, series 8 image 127). The imaged   portion demonstrate soft tissue density without discrete fluid collection   identified.  BONES: Degenerative changes, with severe osteoarthritic changes of the   right hip and remodeling of the right femoral head.    IMPRESSION:  Near complete atelectasis of the right lower lobe and segmental   atelectasis of the left lower lobe with likely mucous pluggingof the   bilateral lower lobe airways. Underlying pneumonia is not excluded.    Incompletely imaged cutaneous/subcutaneous thickening along the right   inferior gluteal cleft and extending into the right perineum and towards   the right perianal softtissue, which is of uncertain etiology. The   imaged portion measures 4.4 x 3.4 x 2.2 cm. No discrete fluid collection   is visualized.    Moderate colonic stool burden with mild distention of the rectum, likely   reflecting constipation. No evidenceof bowel obstruction.      --- End of Report ---    AISSATOU VERGARA MD; Attending Radiologist  This document has been electronically signed. Jul 17 2022  2:21PM    < end of copied text >      Goals of Care Discussion with Family/Proxy/Other   - see note from/family meeting set up for...

## 2022-07-22 NOTE — GOALS OF CARE CONVERSATION - ADVANCED CARE PLANNING - CONVERSATION DETAILS
Discussed patient and sister Daija Mcrae at bedside, plan of care regarding life sustaining treatment. Wishes all aggressive treatment including intubation, CPR. MOLST initiated Discussed patient and sister Daija Mcrae at bedside, plan of care regarding life sustaining treatment. Wishes all aggressive treatment including intubation, CPR. short term tube feeding. MOLST initiated

## 2022-07-22 NOTE — DISCHARGE NOTE PROVIDER - HOSPITAL COURSE
Patient is a 62 years old male from [facility], with PMH of COPD/TEJ- CPAP at night, CHF Grade 1 LV diastolic dysfunction, HLD, PAD, CKD 4, anemia of chronic disease, hypothyroidism, lymphedema,  Chronic Back Pain, OP, Polyarthritis, peripheral neuropathy. Patient presented to ED from assisted living after taking Zocor developed sweats and shakes, was to return to assisted living, however ED course complicated after patient became unresponsive for CO2 retention from COPD. Patient was emergently mechanically intubated and sedated in ED. ICU consulted. Patient admitted to ICU for Acute Hypoxic Respiratory Failure Secondary to COPD exacerbation. Surgery consulted for perianal bleeding from punctate site, unexplained, no trauma. Suture placed in ED.   CT chest, abdomen and pelvis near complete atelectasis of the right lower lobe and segmental atelectasis of the left lower lobe with likely mucous plugging of the bilateral lower lobe airways. Underlying pneumonia is not excluded. Incompletely imaged cutaneous/subcutaneous thickening along the right inferior gluteal cleft and extending into the right perineum and towards the right perianal soft tissue, which is of uncertain etiology. The imaged portion measures 4.4 x 3.4 x 2.2 cm. No discrete fluid collection is visualized. Moderate colonic stool burden with mild distention of the rectum, likely reflecting constipation. No evidence of bowel obstruction.  During ICU stay patient was hemodynamically unstable requiring vasopressor support. TOV attempted however failed and Daley catheter reinserted, restarted on BPH medication. Patient respiratory status improved and was extubated.   Patient downgraded to SCU for further medical management.  on Prednisone taper. given last dose antibiotic 7/21.   PT recs TG. CM/SW to follow.  D/C'd daley for TOV 7/22       Problem/Plan - 5:  ·  Problem: Hyperlipidemia.   ·  Plan: hold Zocor as patient develop sweat and shaking  will need to follow up outpatient for further recommendations.     Problem/Plan - 6:  ·  Problem: Anemia.   ·  Plan: secondary anemia of chronic disease  H/H trending down from 8 to 7.1  no active bleeding currently   PRBC not given during this admission  transfuse as appropriate  Type and screen done 7/21.     Problem/Plan - 7:  ·  Problem: SHEYLA (acute kidney injury).   ·  Plan: SHEYLA on CKD stage 4  likely due to prerenal due to hypotension in ED  baseline 3.8   mild hypernatremia-give free water, s/p low dose IVF x 12hr.  resolved  Creatinine 4.66--> 3.72 today  monitor BMP  consider nephrology consult if Creatinine continues to uptrend  will need outpatient follow up.     Problem/Plan - 8:  ·  Problem: Lymphedema.   ·  Plan: lymphedema with PAD  b/l lower leg discoloration, dry scaly scabs, no open wound  Doppler negative for DVT  Lasix held due to SHEYLA.     Problem/Plan - 9:  ·  Problem: Wound.   ·  Plan: perianal wound stable  Wound care consulted  Surgery recommending outpatient follow up, no surgical intervention at this time.     Problem/Plan - 10:  ·  Problem: Chronic back pain.   ·  Plan; left thigh pain- patient fell in a pot hole  continue Lidocaine patch  patient on dilaudid, however none during this admission, will hold off as patient developed unresponsiveness in ED- patient not complaining of pain.     Problem/Plan - 11:  ·  Problem: Bipolar disorder.   ·  Plan: continue Risperdal, buproprion and Depakote. Patient is a 62 years old male from [facility], with PMH of COPD/TEJ- CPAP at night, CHF Grade 1 LV diastolic dysfunction, HLD, PAD, CKD 4, anemia of chronic disease, hypothyroidism, lymphedema,  Chronic Back Pain, OP, Polyarthritis, peripheral neuropathy. Patient presented to ED from assisted living after taking Zocor developed sweats and shakes, was to return to assisted living, however ED course complicated after patient became unresponsive for CO2 retention from COPD. Patient was emergently mechanically intubated and sedated in ED. ICU consulted. Patient admitted to ICU for Acute Hypoxic Respiratory Failure Secondary to COPD exacerbation. Surgery consulted for perianal bleeding from punctate site, unexplained, no trauma. Suture placed in ED.   CT chest, abdomen and pelvis near complete atelectasis of the right lower lobe and segmental atelectasis of the left lower lobe with likely mucous plugging of the bilateral lower lobe airways. Underlying pneumonia is not excluded. Incompletely imaged cutaneous/subcutaneous thickening along the right inferior gluteal cleft and extending into the right perineum and towards the right perianal soft tissue, which is of uncertain etiology. The imaged portion measures 4.4 x 3.4 x 2.2 cm. No discrete fluid collection is visualized. Moderate colonic stool burden with mild distention of the rectum, likely reflecting constipation. No evidence of bowel obstruction.  During ICU stay patient was hemodynamically unstable requiring vasopressor support. TOV attempted however failed and Daley catheter reinserted, restarted on BPH medication. Patient respiratory status improved and was extubated.   Patient downgraded to SCU for further medical management.  on Prednisone taper. given last dose antibiotic 7/22.   PT recs TG.  D/C'd daley for TOV 7/22, Voided freely. continues flomax and proscar.          Problem/Plan - 6:  ·  Problem: Anemia.   ·  Plan: secondary anemia of chronic disease  H/H trending down from 8 to 7.1  no active bleeding currently   PRBC not given during this admission  transfuse as appropriate  Type and screen done 7/21.     Problem/Plan - 7:  ·  Problem: SHEYLA (acute kidney injury).   ·  Plan: SHEYLA on CKD stage 4  likely due to prerenal due to hypotension in ED  baseline 3.8   mild hypernatremia-give free water, s/p low dose IVF x 12hr.  resolved  Creatinine 4.66--> 3.72 today  monitor BMP  consider nephrology consult if Creatinine continues to uptrend  will need outpatient follow up.     Problem/Plan - 8:  ·  Problem: Lymphedema.   ·  Plan: lymphedema with PAD  b/l lower leg discoloration, dry scaly scabs, no open wound  Doppler negative for DVT  Lasix held due to SHEYLA.     Problem/Plan - 9:  ·  Problem: Wound.   ·  Plan: perianal wound stable  Wound care consulted  Surgery recommending outpatient follow up, no surgical intervention at this time.     Problem/Plan - 10:  ·  Problem: Chronic back pain.   ·  Plan; left thigh pain- patient fell in a pot hole  continue Lidocaine patch  patient on dilaudid, however none during this admission, will hold off as patient developed unresponsiveness in ED- patient not complaining of pain.     Problem/Plan - 11:  ·  Problem: Bipolar disorder.   ·  Plan: continue Risperdal, buproprion and Depakote. Patient is a 62 years old male from [facility], with PMH of COPD/TEJ- CPAP at night, CHF Grade 1 LV diastolic dysfunction, HLD, PAD, CKD 4, anemia of chronic disease, hypothyroidism, lymphedema,  Chronic Back Pain, OP, Polyarthritis, peripheral neuropathy. Patient presented to ED from assisted living after taking Zocor developed sweats and shakes, was to return to assisted living, however ED course complicated after patient became unresponsive for CO2 retention from COPD. Patient was emergently mechanically intubated and sedated in ED. ICU consulted. Patient admitted to ICU for Acute Hypoxic Respiratory Failure Secondary to COPD exacerbation. Surgery consulted for perianal bleeding from punctate site, unexplained, no trauma. Suture placed in ED.   CT chest, abdomen and pelvis near complete atelectasis of the right lower lobe and segmental atelectasis of the left lower lobe with likely mucous plugging of the bilateral lower lobe airways. Underlying pneumonia is not excluded. Incompletely imaged cutaneous/subcutaneous thickening along the right inferior gluteal cleft and extending into the right perineum and towards the right perianal soft tissue, which is of uncertain etiology. The imaged portion measures 4.4 x 3.4 x 2.2 cm. No discrete fluid collection is visualized. Moderate colonic stool burden with mild distention of the rectum, likely reflecting constipation. No evidence of bowel obstruction.  During ICU stay patient was hemodynamically unstable requiring vasopressor support. TOV attempted however failed and Daley catheter reinserted, restarted on BPH medication. Patient respiratory status improved and was extubated.   Patient downgraded to SCU for further medical management.  on Prednisone taper. given last dose antibiotic 7/22.   Discontinued daley catheter and passed Trial of void. Continues Flomax and Proscar.   PT evaluated and recommends TG.  Last COVID PCR negative 7/22.     Patient is medically optimized for discharge to rehab facility.   Please refer to medical records/progress notes for in depth hospital course as this is a brief summary.        Patient is a 62 years old male from [facility], with PMH of COPD/TEJ- CPAP at night, CHF Grade 1 LV diastolic dysfunction, HLD, PAD, CKD 4, anemia of chronic disease, hypothyroidism, lymphedema,  Chronic Back Pain, OP, Polyarthritis, peripheral neuropathy. Patient presented to ED from assisted living after taking Zocor developed sweats and shakes, was to return to assisted living, however ED course complicated after patient became unresponsive for CO2 retention from COPD. Patient was emergently mechanically intubated and sedated in ED. ICU consulted.   Patient admitted to ICU for Acute Hypoxic Respiratory Failure Secondary to COPD exacerbation. Surgery consulted for perianal bleeding from punctate site, unexplained, no trauma. Suture placed in ED.   CT chest, abdomen and pelvis near complete atelectasis of the right lower lobe and segmental atelectasis of the left lower lobe with likely mucous plugging of the bilateral lower lobe airways. Underlying pneumonia is not excluded. Incompletely imaged cutaneous/subcutaneous thickening along the right inferior gluteal cleft and extending into the right perineum and towards the right perianal soft tissue, which is of uncertain etiology. The imaged portion measures 4.4 x 3.4 x 2.2 cm. No discrete fluid collection is visualized. Moderate colonic stool burden with mild distention of the rectum, likely reflecting constipation. No evidence of bowel obstruction.  During ICU stay patient was hemodynamically unstable requiring vasopressor support. TOV attempted however failed and Daley catheter reinserted, Restarted on BPH medication. Patient respiratory status improved and was extubated 7/20/22.   Patient downgraded to SCU for further medical management.  on Prednisone taper. given last dose antibiotic 7/22.   SHEYLA on CKD 4- Scr remains improving slowly to 3.72 from 6.0 (on admission) and lasix has been held. Patient is Euvolumic.   Followed by surgery team for perirectal wound, wound site healing well. Surgery recommends to continue local wound care with NS with Dry dressing, pt needs to follow up with colorectal surgeon dr. Cunningham outpatient and wound care team at nursing facility.  Patient with chronic anemia, no s/sx of bleeding. hold ASA on discharge. advised monitoring CBC closely at facility.   Discontinued daley catheter and passed Trial of void. Continues Flomax and Proscar.   PT evaluated and recommends TG.  Last COVID PCR negative 7/22.     Patient is medically optimized for discharge to rehab facility.   Please refer to medical records/progress notes for in depth hospital course as this is a brief summary.        Patient is a 62 years old male from [facility], with PMH of COPD/TEJ- CPAP at night, CHF Grade 1 LV diastolic dysfunction, HLD, PAD, CKD 4, anemia of chronic disease, hypothyroidism, lymphedema,  Chronic Back Pain, OP, Polyarthritis, peripheral neuropathy. Patient presented to ED from assisted living after taking Zocor developed sweats and shakes, was to return to assisted living, however ED course complicated after patient became unresponsive for CO2 retention from COPD. Patient was emergently mechanically intubated and sedated in ED. ICU consulted.   Patient admitted to ICU for Acute Hypoxic Respiratory Failure Secondary to COPD exacerbation. Surgery consulted for perianal bleeding from punctate site, unexplained, no trauma. Suture placed in ED.   CT chest, abdomen and pelvis near complete atelectasis of the right lower lobe and segmental atelectasis of the left lower lobe with likely mucous plugging of the bilateral lower lobe airways. Underlying pneumonia is not excluded. Incompletely imaged cutaneous/subcutaneous thickening along the right inferior gluteal cleft and extending into the right perineum and towards the right perianal soft tissue, which is of uncertain etiology. The imaged portion measures 4.4 x 3.4 x 2.2 cm. No discrete fluid collection is visualized. Moderate colonic stool burden with mild distention of the rectum, likely reflecting constipation. No evidence of bowel obstruction.  During ICU stay patient was hemodynamically unstable requiring vasopressor support. TOV attempted however failed and Daley catheter reinserted, Restarted on BPH medication. Patient respiratory status improved and was extubated 7/20/22.   Patient downgraded to SCU for further medical management.  on Prednisone taper. given last dose antibiotic 7/22.   SHEYLA on CKD 4- Scr remains improving slowly to 3.72 from 6.0 (on admission) and lasix has been held. Patient is Euvolumic.   Followed by surgery team for perirectal wound, wound site healing well. Surgery recommends to continue local wound care with NS with Dry dressing, pt needs to follow up with colorectal surgeon dr. Cunningham outpatient and wound care team at nursing facility.  Patient with chronic anemia, no s/sx of bleeding. Fecal occult blood is negative.  hold ASA on discharge. advised monitoring CBC closely at facility.   Discontinued daley catheter and passed Trial of void. Continues Flomax and Proscar.   PT evaluated and recommends TG.  Last COVID PCR negative 7/22.     Patient is medically optimized for discharge to rehab facility.   Please refer to medical records/progress notes for in depth hospital course as this is a brief summary.        Patient is a 62 years old male from [facility], with PMH of COPD/TEJ- CPAP at night, CHF Grade 1 LV diastolic dysfunction, HLD, PAD, CKD 4, anemia of chronic disease, hypothyroidism, lymphedema,  Chronic Back Pain, OP, Polyarthritis, peripheral neuropathy. Patient presented to ED from assisted living after taking Zocor developed sweats and shakes, was to return to assisted living, however ED course complicated after patient became unresponsive for CO2 retention from COPD. Patient was emergently mechanically intubated and sedated in ED. ICU consulted.   Patient admitted to ICU for Acute Hypoxic Respiratory Failure Secondary to COPD exacerbation. Surgery consulted for perianal bleeding from punctate site, unexplained, no trauma. Suture placed in ED.   CT chest, abdomen and pelvis near complete atelectasis of the right lower lobe and segmental atelectasis of the left lower lobe with likely mucous plugging of the bilateral lower lobe airways. Underlying pneumonia is not excluded. Incompletely imaged cutaneous/subcutaneous thickening along the right inferior gluteal cleft and extending into the right perineum and towards the right perianal soft tissue, which is of uncertain etiology. The imaged portion measures 4.4 x 3.4 x 2.2 cm. No discrete fluid collection is visualized. Moderate colonic stool burden with mild distention of the rectum, likely reflecting constipation. No evidence of bowel obstruction.  During ICU stay patient was hemodynamically unstable requiring vasopressor support. TOV attempted however failed and Daley catheter reinserted, Restarted on BPH medication. Patient respiratory status improved and was extubated 7/20/22.   Patient downgraded to SCU for further medical management.  on Prednisone taper. given last dose antibiotic 7/22.   SHEYLA on CKD 4- Scr remains improving slowly to 3.72 from 6.0 (on admission) and lasix has been held. Patient is Euvolumic.   Followed by surgery team for perirectal wound, wound site healing well. Surgery recommends to continue local wound care with NS with Dry dressing, pt needs to follow up with colorectal surgeon dr. Cunningham outpatient and wound care team at nursing facility.  Patient with chronic anemia, no s/sx of bleeding. Fecal occult blood is negative.  hold ASA on discharge. advised monitoring CBC closely at facility.   Discontinued daley catheter and passed Trial of void. Continues Flomax and Proscar.   PT evaluated and recommends TG.  Last COVID PCR negative 7/22.     Patient is medically optimized for discharge to rehab facility.   Please refer to medical records/progress notes for in depth hospital course as this is a brief summary.     for a full account of hospital course please refer to actual medical records for this is a brief summery

## 2022-07-22 NOTE — PROGRESS NOTE ADULT - SUBJECTIVE AND OBJECTIVE BOX
`Patient is a 62y old  Male who presents with a chief complaint of AHRF due to COPD exacerbation (22 Jul 2022 09:24)    PATIENT IS SEEN AND EXAMINED IN MEDICAL FLOOR.  SOWMYA [    ]    ALYSIA [   ]      GT [   ]    ALLERGIES:  No Known Allergies      Daily     Daily     VITALS:    Vital Signs Last 24 Hrs  T(C): 36.1 (22 Jul 2022 04:39), Max: 36.9 (21 Jul 2022 11:41)  T(F): 97 (22 Jul 2022 04:39), Max: 98.5 (21 Jul 2022 11:41)  HR: 77 (22 Jul 2022 04:39) (77 - 102)  BP: 145/89 (22 Jul 2022 04:39) (128/53 - 145/89)  BP(mean): --  RR: 19 (22 Jul 2022 04:39) (19 - 20)  SpO2: 97% (22 Jul 2022 04:39) (95% - 99%)    Parameters below as of 22 Jul 2022 04:39  Patient On (Oxygen Delivery Method): nasal cannula  O2 Flow (L/min): 3      LABS:    CBC Full  -  ( 22 Jul 2022 06:54 )  WBC Count : 7.61 K/uL  RBC Count : 2.32 M/uL  Hemoglobin : 7.3 g/dL  Hematocrit : 23.9 %  Platelet Count - Automated : 235 K/uL  Mean Cell Volume : 103.0 fl  Mean Cell Hemoglobin : 31.5 pg  Mean Cell Hemoglobin Concentration : 30.5 gm/dL  Auto Neutrophil # : x  Auto Lymphocyte # : x  Auto Monocyte # : x  Auto Eosinophil # : x  Auto Basophil # : x  Auto Neutrophil % : x  Auto Lymphocyte % : x  Auto Monocyte % : x  Auto Eosinophil % : x  Auto Basophil % : x      07-22    143  |  108  |  91<H>  ----------------------------<  117<H>  4.3   |  28  |  3.72<H>    Ca    9.9      22 Jul 2022 06:54  Phos  3.6     07-22  Mg     3.0     07-22    TPro  6.5  /  Alb  2.3<L>  /  TBili  0.2  /  DBili  x   /  AST  6<L>  /  ALT  10  /  AlkPhos  73  07-22    CAPILLARY BLOOD GLUCOSE            LIVER FUNCTIONS - ( 22 Jul 2022 06:54 )  Alb: 2.3 g/dL / Pro: 6.5 g/dL / ALK PHOS: 73 U/L / ALT: 10 U/L DA / AST: 6 U/L / GGT: x           Creatinine Trend: 3.72<--, 4.06<--, 4.66<--, 5.03<--, 5.20<--, 6.03<--  I&O's Summary    21 Jul 2022 07:01  -  22 Jul 2022 07:00  --------------------------------------------------------  IN: 720 mL / OUT: 4000 mL / NET: -3280 mL            ET Tube ET Tube  07-18 @ 06:18   Normal Respiratory Ivelisse present  --    No polymorphonuclear leukocytes seen per low power field  Moderate Squamous epithelial cells seen per low power field  Moderate Yeast like cells seen per oil power field  Few Gram positive cocci in pairs seen per oil power field  Rare Gram PositiveRods seen per oil power field  Rare Gram Negative Rods seen per oil power field  Rare Gram Negative Diplococci seen per oil power field      Clean Catch Clean Catch (Midstream)  07-17 @ 14:56   No growth  --  --      .Blood Blood-Peripheral  07-17 @ 14:46   No growth to date.  --  --          MEDICATIONS:    MEDICATIONS  (STANDING):  albuterol/ipratropium for Nebulization 3 milliLiter(s) Nebulizer every 6 hours  buPROPion XL (24-Hour) . 150 milliGRAM(s) Oral daily  calcitriol   Capsule 0.25 MICROGram(s) Oral daily  cefepime   IVPB 1000 milliGRAM(s) IV Intermittent every 24 hours  dextrose 5% + sodium chloride 0.45%. 1000 milliLiter(s) (60 mL/Hr) IV Continuous <Continuous>  diVALproex  milliGRAM(s) Oral <User Schedule>  ferrous    sulfate 325 milliGRAM(s) Oral daily  finasteride 5 milliGRAM(s) Oral daily  folic acid 1 milliGRAM(s) Oral daily  heparin   Injectable 5000 Unit(s) SubCutaneous every 8 hours  levothyroxine 125 MICROGram(s) Oral daily  lidocaine   4% Patch 1 Patch Transdermal daily  midodrine 10 milliGRAM(s) Oral every 8 hours  mupirocin 2% Ointment 1 Application(s) Both Nostrils two times a day  pantoprazole    Tablet 40 milliGRAM(s) Oral before breakfast  polyethylene glycol 3350 17 Gram(s) Oral daily  predniSONE   Tablet 30 milliGRAM(s) Oral daily  risperiDONE   Tablet 2 milliGRAM(s) Oral daily  senna 2 Tablet(s) Oral at bedtime  sevelamer carbonate 800 milliGRAM(s) Oral every 8 hours  tamsulosin 0.4 milliGRAM(s) Oral at bedtime      MEDICATIONS  (PRN):  acetaminophen     Tablet .. 650 milliGRAM(s) Oral every 6 hours PRN Mild Pain (1 - 3), Moderate Pain (4 - 6)  ALBUTerol    90 MICROgram(s) HFA Inhaler 2 Puff(s) Inhalation every 6 hours PRN Shortness of Breath and/or Wheezing      REVIEW OF SYSTEMS:                           ALL ROS DONE [ X   ]    CONSTITUTIONAL:  LETHARGIC [   ], FEVER [   ], UNRESPONSIVE [   ]  CVS:  CP  [   ], SOB, [   ], PALPITATIONS [   ], DIZZYNESS [   ]  RS: COUGH [   ], SPUTUM [   ]  GI: ABDOMINAL PAIN [   ], NAUSEA [   ], VOMITINGS [   ], DIARRHEA [   ], CONSTIPATION [   ]  :  DYSURIA [   ], NOCTURIA [   ], INCREASED FREQUENCY [   ], DRIBLING [   ],  SKELETAL: PAINFUL JOINTS [   ], SWOLLEN JOINTS [   ], NECK ACHE [   ], LOW BACK ACHE [   ],  SKIN : ULCERS [   ], RASH [   ], ITCHING [   ]  CNS: HEAD ACHE [   ], DOUBLE VISION [   ], BLURRED VISION [   ], AMS / CONFUSION [   ], SEIZURES [   ], WEAKNESS [   ],TINGLING / NUMBNESS [   ]      PHYSICAL EXAMINATION:  GENERAL APPEARANCE: NO DISTRESS  HEENT:  NO PALLOR, NO  JVD,  NO   NODES, NECK SUPPLE  CVS: S1 +, S2 +,   RS: AEEB,  OCCASIONAL  RALES +,   NO RONCHI  ABD: SOFT, NT, NO, BS +  EXT: NO PE  SKIN: WARM,   SKELETAL:  ROM ACCEPTABLE  CNS:  AAO X 2    RADIOLOGY :    ACC: [id number] EXAM:  XR CHEST PORTABLE URGENT 1V                        ACC: [id number] EXAM:  XR CHEST PORTABLE URGENT 1V                          PROCEDURE DATE:  07/17/2022          INTERPRETATION:  Portable chest radiograph    CLINICAL INFORMATION: Dyspnea, shortness of breath.    TECHNIQUE:  Portable  AP chest radiograph.    COMPARISON: 9/1/2021 chest .    FINDINGS:  CATHETERS AND TUBES: ET tube tip above tracheal bifurcation. NG tube tip   coiled within the hypopharynx. PULMONARY: LEFT basilar airspace disease   obscures diaphragm contour. Is mild vascular congestion.  .   No pneumothorax.    HEART/VASCULAR: The  heart is enlarged in transverse diameter. .    BONES: Visualized osseous structures are intact.    IMPRESSION:   LEFT basilarairspace disease.  ET tube tip above tracheal bifurcation.  NG tube coiled within the hypopharynx..    Follow-up AP portable chest radiograph 7/17/2022 at 12:59 PM:  ET tube tip above tracheal bifurcation.  NG tube tip beyond GE junction.  Residual LEFT basilar airspace consolidation and/or effusion obscuring   diaphragm contour. RIGHT basilar mild diffuse airspace disease and linear   atelectasis. Upper zones clear.  Cardiomegaly.      ASSESSMENT :       PLAN:  HPI:  Patient is AAOx0, intubated; So hx was obtained from previous charts and NH paperwork    Patient 62 years old Morbid obese male from [facility] ambulates with walker has PMHx of bph, ckd, and bipolar disorder, COPD not on O2 at home, hypothyroidism, anemia, left DVT, CKD, BPH, HLD, bipolar disorder, ambulatory dysfunction, lymphedema presented with shortness of breath and unresponsivness. The patient was sent here due to sweaty and shaky reaction to taking new Zocor pill. Patient was intubated in ED and ICU was called for Acute hypoxia respiratory failure likely secondary to COPD exacerbation.  (17 Jul 2022 11:15)    # CASE DISCUSSED AT LENGTH WITH SISTER JAMES AT BEDSIDE. ALL QUESTIONS ANSWERED. PATIENT AND SISTER AGREEABLE FOR DISCHARGE TO Health system FOR SUBACUTE REHAB.     # ACUTE HYPOXIC RESPIRATORY FAILURE W/ UNDERLYING COPD/TEJ W/ SUSPECTED ASPIRATION PNEUMONITIS  - S/P MECHANICAL VENTILATION  - ON NC  - ON BRONCHODILATORS, SYMBICORT  - ON CEFEPIME, F/U BCX  - CRITICAL CARE CONSULT IN PROGRESS    # ACUTE METABOLIC ENCEPHALOPATHY LIKELY S/T HYPERCAPNIA  - R/O SEIZURE - F/U EEG  - ON HOME DIVALPROEX    # SEPSIS S/T ASPIRATION PNEUMONITIS  - EMPIRICALLY ON CEFEPIME  - S/P VASOPRESSORS  - F/U BCX   - CRITICAL CARE CONSULT IN PROGRESS    # SHEYLA ON CKD  # BPH , URINARY RETENTION  - MONITORING CR  - AVOID NEPHROTOXIC AGENTS    - FAILED TOV, PLACED RE-PLACED HATFIELD    # PERIANAL SUBCUTANEOUS TISSUE BLEEDING  - CT A/P REVIEWED  - SURGERY CONSULT IN PROGRESS    # CLASS III OBESITY    # GI AND DVT PPX   `Patient is a 62y old  Male who presents with a chief complaint of AHRF due to COPD exacerbation (22 Jul 2022 09:24)    PATIENT IS SEEN AND EXAMINED IN MEDICAL FLOOR.  SOWMYA [    ]    ALYSIA [   ]      GT [   ]    ALLERGIES:  No Known Allergies      Daily     Daily     VITALS:    Vital Signs Last 24 Hrs  T(C): 36.1 (22 Jul 2022 04:39), Max: 36.9 (21 Jul 2022 11:41)  T(F): 97 (22 Jul 2022 04:39), Max: 98.5 (21 Jul 2022 11:41)  HR: 77 (22 Jul 2022 04:39) (77 - 102)  BP: 145/89 (22 Jul 2022 04:39) (128/53 - 145/89)  BP(mean): --  RR: 19 (22 Jul 2022 04:39) (19 - 20)  SpO2: 97% (22 Jul 2022 04:39) (95% - 99%)    Parameters below as of 22 Jul 2022 04:39  Patient On (Oxygen Delivery Method): nasal cannula  O2 Flow (L/min): 3      LABS:    CBC Full  -  ( 22 Jul 2022 06:54 )  WBC Count : 7.61 K/uL  RBC Count : 2.32 M/uL  Hemoglobin : 7.3 g/dL  Hematocrit : 23.9 %  Platelet Count - Automated : 235 K/uL  Mean Cell Volume : 103.0 fl  Mean Cell Hemoglobin : 31.5 pg  Mean Cell Hemoglobin Concentration : 30.5 gm/dL  Auto Neutrophil # : x  Auto Lymphocyte # : x  Auto Monocyte # : x  Auto Eosinophil # : x  Auto Basophil # : x  Auto Neutrophil % : x  Auto Lymphocyte % : x  Auto Monocyte % : x  Auto Eosinophil % : x  Auto Basophil % : x      07-22    143  |  108  |  91<H>  ----------------------------<  117<H>  4.3   |  28  |  3.72<H>    Ca    9.9      22 Jul 2022 06:54  Phos  3.6     07-22  Mg     3.0     07-22    TPro  6.5  /  Alb  2.3<L>  /  TBili  0.2  /  DBili  x   /  AST  6<L>  /  ALT  10  /  AlkPhos  73  07-22    CAPILLARY BLOOD GLUCOSE            LIVER FUNCTIONS - ( 22 Jul 2022 06:54 )  Alb: 2.3 g/dL / Pro: 6.5 g/dL / ALK PHOS: 73 U/L / ALT: 10 U/L DA / AST: 6 U/L / GGT: x           Creatinine Trend: 3.72<--, 4.06<--, 4.66<--, 5.03<--, 5.20<--, 6.03<--  I&O's Summary    21 Jul 2022 07:01  -  22 Jul 2022 07:00  --------------------------------------------------------  IN: 720 mL / OUT: 4000 mL / NET: -3280 mL            ET Tube ET Tube  07-18 @ 06:18   Normal Respiratory Ivelisse present  --    No polymorphonuclear leukocytes seen per low power field  Moderate Squamous epithelial cells seen per low power field  Moderate Yeast like cells seen per oil power field  Few Gram positive cocci in pairs seen per oil power field  Rare Gram PositiveRods seen per oil power field  Rare Gram Negative Rods seen per oil power field  Rare Gram Negative Diplococci seen per oil power field      Clean Catch Clean Catch (Midstream)  07-17 @ 14:56   No growth  --  --      .Blood Blood-Peripheral  07-17 @ 14:46   No growth to date.  --  --          MEDICATIONS:    MEDICATIONS  (STANDING):  albuterol/ipratropium for Nebulization 3 milliLiter(s) Nebulizer every 6 hours  buPROPion XL (24-Hour) . 150 milliGRAM(s) Oral daily  calcitriol   Capsule 0.25 MICROGram(s) Oral daily  cefepime   IVPB 1000 milliGRAM(s) IV Intermittent every 24 hours  dextrose 5% + sodium chloride 0.45%. 1000 milliLiter(s) (60 mL/Hr) IV Continuous <Continuous>  diVALproex  milliGRAM(s) Oral <User Schedule>  ferrous    sulfate 325 milliGRAM(s) Oral daily  finasteride 5 milliGRAM(s) Oral daily  folic acid 1 milliGRAM(s) Oral daily  heparin   Injectable 5000 Unit(s) SubCutaneous every 8 hours  levothyroxine 125 MICROGram(s) Oral daily  lidocaine   4% Patch 1 Patch Transdermal daily  midodrine 10 milliGRAM(s) Oral every 8 hours  mupirocin 2% Ointment 1 Application(s) Both Nostrils two times a day  pantoprazole    Tablet 40 milliGRAM(s) Oral before breakfast  polyethylene glycol 3350 17 Gram(s) Oral daily  predniSONE   Tablet 30 milliGRAM(s) Oral daily  risperiDONE   Tablet 2 milliGRAM(s) Oral daily  senna 2 Tablet(s) Oral at bedtime  sevelamer carbonate 800 milliGRAM(s) Oral every 8 hours  tamsulosin 0.4 milliGRAM(s) Oral at bedtime      MEDICATIONS  (PRN):  acetaminophen     Tablet .. 650 milliGRAM(s) Oral every 6 hours PRN Mild Pain (1 - 3), Moderate Pain (4 - 6)  ALBUTerol    90 MICROgram(s) HFA Inhaler 2 Puff(s) Inhalation every 6 hours PRN Shortness of Breath and/or Wheezing      REVIEW OF SYSTEMS:                           ALL ROS DONE [ X   ]    CONSTITUTIONAL:  LETHARGIC [   ], FEVER [   ], UNRESPONSIVE [   ]  CVS:  CP  [   ], SOB, [   ], PALPITATIONS [   ], DIZZYNESS [   ]  RS: COUGH [   ], SPUTUM [   ]  GI: ABDOMINAL PAIN [   ], NAUSEA [   ], VOMITINGS [   ], DIARRHEA [   ], CONSTIPATION [   ]  :  DYSURIA [   ], NOCTURIA [   ], INCREASED FREQUENCY [   ], DRIBLING [   ],  SKELETAL: PAINFUL JOINTS [   ], SWOLLEN JOINTS [   ], NECK ACHE [   ], LOW BACK ACHE [   ],  SKIN : ULCERS [   ], RASH [   ], ITCHING [   ]  CNS: HEAD ACHE [   ], DOUBLE VISION [   ], BLURRED VISION [   ], AMS / CONFUSION [   ], SEIZURES [   ], WEAKNESS [   ],TINGLING / NUMBNESS [   ]      PHYSICAL EXAMINATION:  GENERAL APPEARANCE: NO DISTRESS  HEENT:  NO PALLOR, NO  JVD,  NO   NODES, NECK SUPPLE  CVS: S1 +, S2 +,   RS: AEEB,  OCCASIONAL  RALES +,   NO RONCHI  ABD: SOFT, NT, NO, BS +  EXT: NO PE  SKIN: WARM,   SKELETAL:  ROM ACCEPTABLE  CNS:  AAO X 2    RADIOLOGY :    ACC: [id number] EXAM:  XR CHEST PORTABLE URGENT 1V                        ACC: [id number] EXAM:  XR CHEST PORTABLE URGENT 1V                          PROCEDURE DATE:  07/17/2022          INTERPRETATION:  Portable chest radiograph    CLINICAL INFORMATION: Dyspnea, shortness of breath.    TECHNIQUE:  Portable  AP chest radiograph.    COMPARISON: 9/1/2021 chest .    FINDINGS:  CATHETERS AND TUBES: ET tube tip above tracheal bifurcation. NG tube tip   coiled within the hypopharynx. PULMONARY: LEFT basilar airspace disease   obscures diaphragm contour. Is mild vascular congestion.  .   No pneumothorax.    HEART/VASCULAR: The  heart is enlarged in transverse diameter. .    BONES: Visualized osseous structures are intact.    IMPRESSION:   LEFT basilarairspace disease.  ET tube tip above tracheal bifurcation.  NG tube coiled within the hypopharynx..    Follow-up AP portable chest radiograph 7/17/2022 at 12:59 PM:  ET tube tip above tracheal bifurcation.  NG tube tip beyond GE junction.  Residual LEFT basilar airspace consolidation and/or effusion obscuring   diaphragm contour. RIGHT basilar mild diffuse airspace disease and linear   atelectasis. Upper zones clear.  Cardiomegaly.      ASSESSMENT :       PLAN:  HPI:  Patient is AAOx0, intubated; So hx was obtained from previous charts and NH paperwork    Patient 62 years old Morbid obese male from [facility] ambulates with walker has PMHx of bph, ckd, and bipolar disorder, COPD not on O2 at home, hypothyroidism, anemia, left DVT, CKD, BPH, HLD, bipolar disorder, ambulatory dysfunction, lymphedema presented with shortness of breath and unresponsivness. The patient was sent here due to sweaty and shaky reaction to taking new Zocor pill. Patient was intubated in ED and ICU was called for Acute hypoxia respiratory failure likely secondary to COPD exacerbation.  (17 Jul 2022 11:15)    # CASE DISCUSSED AT LENGTH WITH SISTER JAMES AT BEDSIDE. ALL QUESTIONS ANSWERED. PATIENT AND SISTER AGREEABLE FOR DISCHARGE TO Health system FOR SUBACUTE REHAB.     # ACUTE HYPOXIC RESPIRATORY FAILURE W/ UNDERLYING COPD/TEJ W/ SUSPECTED ASPIRATION PNEUMONITIS  - S/P MECHANICAL VENTILATION  - ON NC  - ON BRONCHODILATORS, SYMBICORT  - S/P CEFEPIME, BCX - NGTD  - CRITICAL CARE CONSULT IN PROGRESS    # ACUTE METABOLIC ENCEPHALOPATHY LIKELY S/T HYPERCAPNIA  - CRITICAL CARE EVALUATION IN PROGRESS  - ON HOME DIVALPROEX    # SEPSIS S/T ASPIRATION PNEUMONITIS  - EMPIRICALLY ON CEFEPIME  - S/P VASOPRESSORS  - F/U BCX   - CRITICAL CARE CONSULT IN PROGRESS    # SHEYLA ON CKD  # BPH , URINARY RETENTION  - MONITORING CR  - AVOID NEPHROTOXIC AGENTS    - FAILED TOV, PLACED RE-PLACED HATFIELD    # PERIANAL SUBCUTANEOUS TISSUE BLEEDING  - CT A/P REVIEWED  - SURGERY CONSULT IN PROGRESS    # CLASS III OBESITY    # GI AND DVT PPX

## 2022-07-22 NOTE — DISCHARGE NOTE PROVIDER - NSDCCPCAREPLAN_GEN_ALL_CORE_FT
PRINCIPAL DISCHARGE DIAGNOSIS  Diagnosis: Acute respiratory failure with hypercapnia  Assessment and Plan of Treatment: continue BiPAP at night and PRN during day  continue cefepime  continue albuterol neb and MDI PRN  continue prednisone taper  blood and urine culture negative.      SECONDARY DISCHARGE DIAGNOSES  Diagnosis: Chronic obstructive pulmonary disease (COPD)  Assessment and Plan of Treatment: Call your Health Care provider upon arrival home to make a follow up appointment within one week.  Take all inhalers as prescribed by your Health Care Provider.  Take steroids as prescribed by your Health Care Provider.  If your cough increases infrequency and severity and/or you have shortness of breath or increased shortness of breath call your Health Care Provider.  If you develop fever, chills, night sweats, malaise, and/or change in mental status call your Health care Provider.  Nutrition is very important.  Eat small frequent meals.  Increase your activity as tolerated.  Do not stay in bed all day  not in exacerbation  will hold nicotine patch- quit smoking 11mns, no patch given this admission. patient denies wanting a cigarette.      Diagnosis: Chronic diastolic heart failure  Assessment and Plan of Treatment: Lasix held due to SHEYLA  continue aspirin  hold Zocor due to sweating and shaking.  Weigh yourself daily.  If you gain 3lbs in 3 days, or 5lbs in a week call your Health Care Provider.  Do not eat or drink foods containing more than 2000mg of salt (sodium) in your diet every day.  Call your Health Care Provider if you have any swelling or increased swelling in your feet, ankles, and/or stomach.  Take all of your medication as directed.  If you become dizzy call your Health Care Provider.      Diagnosis: BPH (benign prostatic hyperplasia)  Assessment and Plan of Treatment: continue Flomax  restarted Proscar  You have an enlarged prostate gland which gets bigger as men get older - it is a very common problem and has nothing to do with prostate cancer  Call your doctor if you are urinating more frequently, have trouble starting to urinate, have weak stream, urine leaking or dribbling, and feeling as though bladder is not empty after urination  Your doctor will monitor your prostate with a rectal exam as well as urine or blood testing  You can help yourself by reducing the amount of fluid you drink before going to bed, limiting the amount of alcohol & caffeine you drink   Avoid cold & allergy medication that contain decongestants or antihistamines which make BPH symptoms worse  You can also "double void" by waiting a moment after urinating & trying again  Take your medication as prescribed - one medication helps to relax the muscle around the urethra and the other medication you may take prevents the prostate from growing more or even shrinking the prostate      Diagnosis: Hyperlipidemia  Assessment and Plan of Treatment: Follow up with PCP for treatment goals, continue medication, have liver function testing every 3 months as anti lipid medications can cause liver irritation, eat low fat, low cholesterol meals      Diagnosis: Acute kidney injury superimposed on CKD  Assessment and Plan of Treatment: Acute kidney injury (SHEYLA) is when the kidneys suddenly stop working effectively.  This can happen when there is less blood flow to the kidneys, there is kidney damage or the path for urine to leave the body is blocked.  SHEYLA can cause decreased urination, blood in the urine, swelling, vomiting, weakness, confusion and/or seisures.  The treatment depends on the cause and severity of the injury.  In any case, while your kidneys are healing you should avoid agents that can cause kidney injury.  Some of these agents include NSAIDs, Gadolinium contrast, and Phosphate containing enemas.      Diagnosis: Anemia  Assessment and Plan of Treatment: Symptoms to report, bleeding, palpitations, fatigue, pale skin, cold skin, dizziness. Take medications as ordered by PCP      Diagnosis: PAD (peripheral artery disease)  Assessment and Plan of Treatment: Follow-up with your primary care physician.Avoid injury and extreme temperatures to your lower extremities.Call your physician if you develop non-healing wounds , numbness/tingling  in your extremities.      Diagnosis: Bipolar disorder  Assessment and Plan of Treatment:     Diagnosis: Pressure injury of skin  Assessment and Plan of Treatment: you were followed by wound care specialist and surgery team Right Ischial wound and Stage 1 pressure injury to both heels and sacral area. non blanchable erythema.   For right Ischial wound, no acute bleeding or signs of infectino thus no acute surgical intervention is needed at this time and continue clean wiht normal saline solution and apply dry protective dressing per surgery recommendation. Follow up with wound care doctor at facility or outpatient surgery team dr. Cunningham after discharge if there is no wound healing or bleeding.   For stage 1 Pressure injury, apply a Foam dressing to the Sacral area Q 72hrs PRN, for protection  -Elevate/float the patients heels using heel protectors and reposition the patient Q 2hrs using wedges or pillows         PRINCIPAL DISCHARGE DIAGNOSIS  Diagnosis: Acute respiratory failure with hypercapnia  Assessment and Plan of Treatment: continue BiPAP at night and PRN during day  continue cefepime  continue albuterol neb and MDI PRN  continue prednisone taper  blood and urine culture negative.      SECONDARY DISCHARGE DIAGNOSES  Diagnosis: Chronic obstructive pulmonary disease (COPD)  Assessment and Plan of Treatment: Call your Health Care provider upon arrival home to make a follow up appointment within one week.  Take all inhalers as prescribed by your Health Care Provider.  Take steroids as prescribed by your Health Care Provider.  If your cough increases infrequency and severity and/or you have shortness of breath or increased shortness of breath call your Health Care Provider.  If you develop fever, chills, night sweats, malaise, and/or change in mental status call your Health care Provider.  Nutrition is very important.  Eat small frequent meals.  Increase your activity as tolerated.  Do not stay in bed all day  not in exacerbation  will hold nicotine patch- quit smoking 11mns, no patch given this admission. patient denies wanting a cigarette.      Diagnosis: Chronic diastolic heart failure  Assessment and Plan of Treatment: Lasix held due to SHEYLA  continue aspirin  hold Zocor due to sweating and shaking.  Weigh yourself daily.  If you gain 3lbs in 3 days, or 5lbs in a week call your Health Care Provider.  Do not eat or drink foods containing more than 2000mg of salt (sodium) in your diet every day.  Call your Health Care Provider if you have any swelling or increased swelling in your feet, ankles, and/or stomach.  Take all of your medication as directed.  If you become dizzy call your Health Care Provider.      Diagnosis: BPH (benign prostatic hyperplasia)  Assessment and Plan of Treatment: continue Flomax  restarted Proscar  You have an enlarged prostate gland which gets bigger as men get older - it is a very common problem and has nothing to do with prostate cancer  Call your doctor if you are urinating more frequently, have trouble starting to urinate, have weak stream, urine leaking or dribbling, and feeling as though bladder is not empty after urination  Your doctor will monitor your prostate with a rectal exam as well as urine or blood testing  You can help yourself by reducing the amount of fluid you drink before going to bed, limiting the amount of alcohol & caffeine you drink   Avoid cold & allergy medication that contain decongestants or antihistamines which make BPH symptoms worse  You can also "double void" by waiting a moment after urinating & trying again  Take your medication as prescribed - one medication helps to relax the muscle around the urethra and the other medication you may take prevents the prostate from growing more or even shrinking the prostate      Diagnosis: Hyperlipidemia  Assessment and Plan of Treatment:    Problem/Plan - 5:  ·  Problem: Hyperlipidemia.   ·  Plan: hold Zocor as patient develop sweat and shaking  will need to follow up outpatient for further recommendations.  Follow up with PCP for treatment goals, continue medication, have liver function testing every 3 months as anti lipid medications can cause liver irritation, eat low fat, low cholesterol meals      Diagnosis: Acute kidney injury superimposed on CKD  Assessment and Plan of Treatment: Acute kidney injury (SHEYLA) is when the kidneys suddenly stop working effectively.  This can happen when there is less blood flow to the kidneys, there is kidney damage or the path for urine to leave the body is blocked.  SHEYLA can cause decreased urination, blood in the urine, swelling, vomiting, weakness, confusion and/or seisures.  The treatment depends on the cause and severity of the injury.  In any case, while your kidneys are healing you should avoid agents that can cause kidney injury.  Some of these agents include NSAIDs, Gadolinium contrast, and Phosphate containing enemas.      Diagnosis: Anemia  Assessment and Plan of Treatment: Symptoms to report, bleeding, palpitations, fatigue, pale skin, cold skin, dizziness. Take medications as ordered by PCP      Diagnosis: PAD (peripheral artery disease)  Assessment and Plan of Treatment: Follow-up with your primary care physician.Avoid injury and extreme temperatures to your lower extremities.Call your physician if you develop non-healing wounds , numbness/tingling  in your extremities.      Diagnosis: Bipolar disorder  Assessment and Plan of Treatment:     Diagnosis: Pressure injury of skin  Assessment and Plan of Treatment: you were followed by wound care specialist and surgery team Right Ischial wound and Stage 1 pressure injury to both heels and sacral area. non blanchable erythema.   For right Ischial wound, no acute bleeding or signs of infectino thus no acute surgical intervention is needed at this time and continue clean wiht normal saline solution and apply dry protective dressing per surgery recommendation. Follow up with wound care doctor at facility or outpatient surgery team dr. Cunningham after discharge if there is no wound healing or bleeding.   For stage 1 Pressure injury, apply a Foam dressing to the Sacral area Q 72hrs PRN, for protection  -Elevate/float the patients heels using heel protectors and reposition the patient Q 2hrs using wedges or pillows         PRINCIPAL DISCHARGE DIAGNOSIS  Diagnosis: Acute respiratory failure with hypercapnia  Assessment and Plan of Treatment: You were admitted for acute hypercapnic respiratory failure, required intubation, transferred to ICU, monitored on ventilator and extubated 7/18.  You were followed by pulmonary team. Use oxygen as needed for shortness or breath and hypoxia. continue BiPAP at night and PRN during day. Setting FIO2 40%, IP 18, EP 6, Rate 12  You were treated for sepsis, aspiration pneumonia.   continue albuterol neb and MDI PRN  continue prednisone with tapered dose        SECONDARY DISCHARGE DIAGNOSES  Diagnosis: Chronic obstructive pulmonary disease (COPD)  Assessment and Plan of Treatment: Take all inhalers as prescribed by your Health Care Provider.  Take steroids as prescribed by your Health Care Provider.  If your cough increases infrequency and severity and/or you have shortness of breath or increased shortness of breath call your Health Care Provider.  If you develop fever, chills, night sweats, malaise, and/or change in mental status call your Health care Provider.  Nutrition is very important.  Eat small frequent meals.  Increase your activity as tolerated.  Do not stay in bed all day  Follow up with your facility MD at rehabilitation and PCP.          Diagnosis: Chronic diastolic heart failure  Assessment and Plan of Treatment: Diuretic medication is on hold due to deterioration of kidney function. Kidney functions are slowly improving. Last Serum Cr level 3.72 from 6.03.    Follow up with your facility MD or PCP when to resume medication.   Weigh yourself daily.  If you gain 3lbs in 3 days, or 5lbs in a week call your Health Care Provider.  Do not eat or drink foods containing more than 2000mg of salt (sodium) in your diet every day.  Call your Health Care Provider if you have any swelling or increased swelling in your feet, ankles, and/or stomach.  Take all of your medication as directed.  If you become dizzy call your Health Care Provider.      Diagnosis: BPH (benign prostatic hyperplasia)  Assessment and Plan of Treatment: continue Flomax and Proscar as prescribed.   Urniary catheter was placed in the ICU for urinary retention. you are voiding freely after removal of catheter.   You have an enlarged prostate gland which gets bigger as men get older - it is a very common problem and has nothing to do with prostate cancer  Call your doctor if you are urinating more frequently, have trouble starting to urinate, have weak stream, urine leaking or dribbling, and feeling as though bladder is not empty after urination  Your doctor will monitor your prostate with a rectal exam as well as urine or blood testing  You can help yourself by reducing the amount of fluid you drink before going to bed, limiting the amount of alcohol & caffeine you drink   Avoid cold & allergy medication that contain decongestants or antihistamines which make BPH symptoms worse  You can also "double void" by waiting a moment after urinating & trying again         Diagnosis: Hyperlipidemia  Assessment and Plan of Treatment: You Cholesterol medication is held due to side effect-sweat and shaking. Follow up with facility MD or PCP to repeat lipid panel and start alternative medication depending on the result.   eat low fat, low cholesterol meals      Diagnosis: Acute kidney injury superimposed on CKD  Assessment and Plan of Treatment: You have history chronic kidney disease stage 4 and deteriorated on admission. Now slowly improving, close to baseline (3.8).   continue medications as prescribed.   Follow up with your facility MD or PCP to monitor kidney functions.   Acute kidney injury (SHYELA) is when the kidneys suddenly stop working effectively.  This can happen when there is less blood flow to the kidneys, there is kidney damage or the path for urine to leave the body is blocked.  SHEYLA can cause decreased urination, blood in the urine, swelling, vomiting, weakness, confusion and/or seisures.  The treatment depends on the cause and severity of the injury.  In any case, while your kidneys are healing you should avoid agents that can cause kidney injury.  Some of these agents include NSAIDs, Gadolinium contrast, and Phosphate containing enemas.      Diagnosis: Anemia  Assessment and Plan of Treatment: Likely due to chronic kidney disease and lung disease.   Symptoms to report, bleeding, palpitations, fatigue, pale skin, cold skin, dizziness. Take medications as ordered by PCP      Diagnosis: PAD (peripheral artery disease)  Assessment and Plan of Treatment: Follow-up with your primary care physician.Avoid injury and extreme temperatures to your lower extremities.Call your physician if you develop non-healing wounds , numbness/tingling  in your extremities.      Diagnosis: Bipolar disorder  Assessment and Plan of Treatment: continue home medications and follow up with your doctor/facility MD after discharge.    Diagnosis: Pressure injury of skin  Assessment and Plan of Treatment: you were followed by wound care specialist and surgery team Right Ischial wound and Stage 1 pressure injury to both heels and sacral area. non blanchable erythema.   For right Ischial wound, no acute bleeding or signs of infectino thus no acute surgical intervention is needed at this time and continue clean wiht normal saline solution and apply dry protective dressing per surgery recommendation. Follow up with wound care doctor at facility or outpatient surgery team dr. Cunningham after discharge if there is no wound healing or bleeding.   For stage 1 Pressure injury, apply a Foam dressing to the Sacral area Q 72hrs PRN, for protection  -Elevate/float the patients heels using heel protectors and reposition the patient Q 2hrs using wedges or pillows         PRINCIPAL DISCHARGE DIAGNOSIS  Diagnosis: Acute respiratory failure with hypercapnia  Assessment and Plan of Treatment: You were admitted for acute hypercapnic respiratory failure, required intubation, transferred to ICU, monitored on ventilator and extubated 7/18.  You were followed by pulmonary team. Use oxygen as needed for shortness or breath and hypoxia. continue BiPAP at night and PRN during day. Setting FIO2 40%, IP 18, EP 6, Rate 12  You were treated for sepsis, aspiration pneumonia.   continue albuterol neb and MDI PRN  continue prednisone with tapered dose        SECONDARY DISCHARGE DIAGNOSES  Diagnosis: Chronic obstructive pulmonary disease (COPD)  Assessment and Plan of Treatment: Take all inhalers as prescribed by your Health Care Provider.  Take steroids as prescribed by your Health Care Provider.  If your cough increases infrequency and severity and/or you have shortness of breath or increased shortness of breath call your Health Care Provider.  If you develop fever, chills, night sweats, malaise, and/or change in mental status call your Health care Provider.  Nutrition is very important.  Eat small frequent meals.  Increase your activity as tolerated.  Do not stay in bed all day  Follow up with your facility MD at rehabilitation and PCP.          Diagnosis: Chronic diastolic heart failure  Assessment and Plan of Treatment: Diuretic medication is on hold due to deterioration of kidney function. Kidney functions are slowly improving. Last Serum Cr level 3.72 from 6.03.    Follow up with your facility MD or PCP when to resume medication.   Weigh yourself daily.  If you gain 3lbs in 3 days, or 5lbs in a week call your Health Care Provider.  Do not eat or drink foods containing more than 2000mg of salt (sodium) in your diet every day.  Call your Health Care Provider if you have any swelling or increased swelling in your feet, ankles, and/or stomach.  Take all of your medication as directed.  If you become dizzy call your Health Care Provider.      Diagnosis: BPH (benign prostatic hyperplasia)  Assessment and Plan of Treatment: continue Flomax and Proscar as prescribed.   Urniary catheter was placed in the ICU for urinary retention. you are voiding freely after removal of catheter.   You have an enlarged prostate gland which gets bigger as men get older - it is a very common problem and has nothing to do with prostate cancer  Call your doctor if you are urinating more frequently, have trouble starting to urinate, have weak stream, urine leaking or dribbling, and feeling as though bladder is not empty after urination  Your doctor will monitor your prostate with a rectal exam as well as urine or blood testing  You can help yourself by reducing the amount of fluid you drink before going to bed, limiting the amount of alcohol & caffeine you drink   Avoid cold & allergy medication that contain decongestants or antihistamines which make BPH symptoms worse  You can also "double void" by waiting a moment after urinating & trying again         Diagnosis: Hyperlipidemia  Assessment and Plan of Treatment: You Cholesterol medication is held due to side effect-sweat and shaking. Follow up with facility MD or PCP to repeat lipid panel and start alternative medication depending on the result.   eat low fat, low cholesterol meals      Diagnosis: Acute kidney injury superimposed on CKD  Assessment and Plan of Treatment: You have history chronic kidney disease stage 4 and deteriorated on admission. Now slowly improving, close to baseline (3.8).   continue medications as prescribed.   Follow up with your facility MD or PCP to monitor kidney functions.   Acute kidney injury (SHEYLA) is when the kidneys suddenly stop working effectively.  This can happen when there is less blood flow to the kidneys, there is kidney damage or the path for urine to leave the body is blocked.  SHEYLA can cause decreased urination, blood in the urine, swelling, vomiting, weakness, confusion and/or seisures.  The treatment depends on the cause and severity of the injury.  In any case, while your kidneys are healing you should avoid agents that can cause kidney injury.  Some of these agents include NSAIDs, Gadolinium contrast, and Phosphate containing enemas.      Diagnosis: Anemia  Assessment and Plan of Treatment: Likely due to chronic kidney disease. no signs of bleeding.   Symptoms to report, bleeding, palpitations, fatigue, pale skin, cold skin, dizziness. Take medications as ordered by PCP. Hold Aspirin for now and monitor CBC at nursing facility and restart Aspirin when lab is stable.       Diagnosis: PAD (peripheral artery disease)  Assessment and Plan of Treatment: you have history of poor circulation with lymphedema due to peripheral artery disease. Doppler resulted negative for DVT.   Hold aspirin for now due to anemia. monitor lab, signs of bleeding. discuss with your doctor when to start aspirin.   Follow-up with your facility MD. Avoid injury and extreme temperatures to your lower extremities. Call your physician if you develop non-healing wounds , numbness/tingling  in your extremities.      Diagnosis: Bipolar disorder  Assessment and Plan of Treatment: continue home medications and follow up with your doctor/facility MD after discharge.    Diagnosis: Pressure injury of skin  Assessment and Plan of Treatment: you were followed by wound care specialist and surgery team Right Ischial wound and Stage 1 pressure injury to both heels and sacral area. non blanchable erythema.   For right Ischial wound, no acute bleeding or signs of infectino thus no acute surgical intervention is needed at this time and continue clean wiht normal saline solution and apply dry protective dressing per surgery recommendation. Follow up with wound care doctor at facility or outpatient surgery team dr. Cunningham after discharge if there is no wound healing or bleeding.   For stage 1 Pressure injury, apply a Foam dressing to the Sacral area Q 72hrs PRN, for protection  -Elevate/float the patients heels using heel protectors and reposition the patient Q 2hrs using wedges or pillows         PRINCIPAL DISCHARGE DIAGNOSIS  Diagnosis: Acute respiratory failure with hypercapnia  Assessment and Plan of Treatment: You were admitted for acute hypercapnic respiratory failure, required intubation, transferred to ICU, monitored on ventilator and extubated 7/18.  You were followed by pulmonary team. Use oxygen as needed for shortness or breath and hypoxia. continue BiPAP at night and PRN during day. Setting FIO2 40%, IP 18, EP 6, Rate 12  You were treated for sepsis, aspiration pneumonia.   continue albuterol neb and MDI PRN  continue prednisone with tapered dose        SECONDARY DISCHARGE DIAGNOSES  Diagnosis: Chronic obstructive pulmonary disease (COPD)  Assessment and Plan of Treatment: Take all inhalers as prescribed by your Health Care Provider.  Take steroids as prescribed by your Health Care Provider.  If your cough increases infrequency and severity and/or you have shortness of breath or increased shortness of breath call your Health Care Provider.  If you develop fever, chills, night sweats, malaise, and/or change in mental status call your Health care Provider.  Nutrition is very important.  Eat small frequent meals.  Increase your activity as tolerated.  Do not stay in bed all day  Follow up with your facility MD at rehabilitation and PCP.          Diagnosis: Chronic diastolic heart failure  Assessment and Plan of Treatment: Diuretic medication is on hold due to deterioration of kidney function. Kidney functions are slowly improving. Last Serum Cr level 3.72 from 6.03.    Follow up with your facility MD or PCP when to resume medication.   Weigh yourself daily.  If you gain 3lbs in 3 days, or 5lbs in a week call your Health Care Provider.  Do not eat or drink foods containing more than 2000mg of salt (sodium) in your diet every day.  Call your Health Care Provider if you have any swelling or increased swelling in your feet, ankles, and/or stomach.  Take all of your medication as directed.  If you become dizzy call your Health Care Provider.      Diagnosis: BPH (benign prostatic hyperplasia)  Assessment and Plan of Treatment: continue Flomax and Proscar as prescribed.   Urniary catheter was placed in the ICU for urinary retention. you are voiding freely after removal of catheter.   You have an enlarged prostate gland which gets bigger as men get older - it is a very common problem and has nothing to do with prostate cancer  Call your doctor if you are urinating more frequently, have trouble starting to urinate, have weak stream, urine leaking or dribbling, and feeling as though bladder is not empty after urination  Your doctor will monitor your prostate with a rectal exam as well as urine or blood testing  You can help yourself by reducing the amount of fluid you drink before going to bed, limiting the amount of alcohol & caffeine you drink   Avoid cold & allergy medication that contain decongestants or antihistamines which make BPH symptoms worse  You can also "double void" by waiting a moment after urinating & trying again         Diagnosis: Hyperlipidemia  Assessment and Plan of Treatment: You Cholesterol medication is held due to side effect-sweat and shaking. Follow up with facility MD or PCP to repeat lipid panel and start alternative medication depending on the result.   eat low fat, low cholesterol meals      Diagnosis: Acute kidney injury superimposed on CKD  Assessment and Plan of Treatment: You have history chronic kidney disease stage 4 and deteriorated on admission. Now slowly improving, close to baseline (3.8).   continue medications as prescribed.   Follow up with your facility MD or PCP to monitor kidney functions.   Acute kidney injury (SHEYLA) is when the kidneys suddenly stop working effectively.  This can happen when there is less blood flow to the kidneys, there is kidney damage or the path for urine to leave the body is blocked.  SHEYLA can cause decreased urination, blood in the urine, swelling, vomiting, weakness, confusion and/or seisures.  The treatment depends on the cause and severity of the injury.  In any case, while your kidneys are healing you should avoid agents that can cause kidney injury.  Some of these agents include NSAIDs, Gadolinium contrast, and Phosphate containing enemas.      Diagnosis: Anemia  Assessment and Plan of Treatment: Likely due to chronic kidney disease. no signs of bleeding.   Fecal occult blood test is negative.   Symptoms to report, bleeding, palpitations, fatigue, pale skin, cold skin, dizziness. Take medications as ordered by PCP. Hold Aspirin for now and monitor CBC at nursing facility and restart Aspirin when lab is stable.       Diagnosis: PAD (peripheral artery disease)  Assessment and Plan of Treatment: you have history of poor circulation with lymphedema due to peripheral artery disease. Doppler resulted negative for DVT.   Hold aspirin for now due to anemia. monitor lab, signs of bleeding. discuss with your doctor when to start aspirin.   Follow-up with your facility MD. Avoid injury and extreme temperatures to your lower extremities. Call your physician if you develop non-healing wounds , numbness/tingling  in your extremities.      Diagnosis: Bipolar disorder  Assessment and Plan of Treatment: continue home medications and follow up with your doctor/facility MD after discharge.    Diagnosis: Pressure injury of skin  Assessment and Plan of Treatment: you were followed by wound care specialist and surgery team Right Ischial wound and Stage 1 pressure injury to both heels and sacral area. non blanchable erythema.   For right Ischial wound, no acute bleeding or signs of infectino thus no acute surgical intervention is needed at this time and continue clean wiht normal saline solution and apply dry protective dressing per surgery recommendation. Follow up with wound care doctor at facility or outpatient surgery team dr. Cunningham after discharge if there is no wound healing or bleeding.   For stage 1 Pressure injury, apply a Foam dressing to the Sacral area Q 72hrs PRN, for protection  -Elevate/float the patients heels using heel protectors and reposition the patient Q 2hrs using wedges or pillows

## 2022-07-22 NOTE — PROGRESS NOTE ADULT - PROBLEM SELECTOR PLAN 6
secondary anemia of chronic disease  H/H trending down from 8 to 7.1  no active bleeding currently   PRBC not given during this admission  transfuse as appropriate  Type and screen done 7/21

## 2022-07-22 NOTE — DISCHARGE NOTE PROVIDER - NSDCFUADDAPPT_GEN_ALL_CORE_FT
follow up with wound care specialist at nursing facility or outpatient colorectal surgery Dr. Cunningham to follow up perirectal wound if there is  bleeding or not improving.

## 2022-07-22 NOTE — PROGRESS NOTE ADULT - ASSESSMENT
Patient is a 62 years old male from [facility], with PMH of COPD/TEJ- CPAP at night, CHF Grade 1 LV diastolic dysfunction, HLD, PAD, CKD 4, anemia of chronic disease, hypothyroidism, lymphedema,  Chronic Back Pain, OP, Polyarthritis, peripheral neuropathy. Patient presented to ED from assisted living after taking Zocor developed sweats and shakes, was to return to assisted living, however ED course complicated after patient became unresponsive for CO2 retention from COPD. Patient was emergently mechanically intubated and sedated in ED. ICU consulted. Patient admitted to ICU for Acute Hypoxic Respiratory Failure Secondary to COPD exacerbation. Surgery consulted for perianal bleeding from punctate site, unexplained, no trauma. Suture placed in ED.   CT chest, abdomen and pelvis near complete atelectasis of the right lower lobe and segmental atelectasis of the left lower lobe with likely mucous plugging of the bilateral lower lobe airways. Underlying pneumonia is not excluded. Incompletely imaged cutaneous/subcutaneous thickening along the right inferior gluteal cleft and extending into the right perineum and towards the right perianal soft tissue, which is of uncertain etiology. The imaged portion measures 4.4 x 3.4 x 2.2 cm. No discrete fluid collection is visualized. Moderate colonic stool burden with mild distention of the rectum, likely reflecting constipation. No evidence of bowel obstruction.  During ICU stay patient was hemodynamically unstable requiring vasopressor support. TOV attempted however failed and Viera catheter reinserted, restarted on BPH medication. Patient respiratory status improved and was extubated.   Patient downgraded to SCU for further medical management.  on Prednisone taper. given last dose antibiotic 7/21.   PT recs TG. CM/SW to follow.

## 2022-07-22 NOTE — DISCHARGE NOTE PROVIDER - NSDCFUADDINST_GEN_ALL_CORE_FT
for perirectal wound, continue clean with NS and apply dry dressing. can remove stitches at surgery follow up or with wound care team.

## 2022-07-22 NOTE — DISCHARGE NOTE PROVIDER - NSDCMRMEDTOKEN_GEN_ALL_CORE_FT
aspirin 81 mg oral tablet: 1 tab(s) orally once a day  buPROPion 75 mg oral tablet: 1 tab(s) orally 2 times a day  busPIRone 10 mg oral tablet: 1 tab(s) orally 2 times a day  calcitriol 0.25 mcg oral capsule: 1 cap(s) orally once a day  Ceftin 250 mg oral tablet: 1 tab(s) orally every 12 hours  until Feb 21, 2022  Dilaudid 4 mg oral tablet: 1 tab(s) orally once a day  divalproex sodium 500 mg oral tablet, extended release: 1 tab(s) orally 2 times a day  FeroSul 325 mg (65 mg elemental iron) oral tablet: 1 tab(s) orally once a day   folic acid 1 mg oral tablet: 1 tab(s) orally once a day  Lasix 40 mg oral tablet: 1 tab(s) orally once a day  lidocaine 4% topical film: Apply topically to affected area once a day  Milk of Magnesia 8% oral suspension: 30 milliliter(s) orally once a day M W F  nicotine 14 mg/24 hr transdermal film, extended release: 1 patch transdermal once a day  pantoprazole 40 mg oral delayed release tablet: 1 tab(s) orally once a day (before a meal)  predniSONE 20 mg oral tablet: 2 tab(s) orally once a day  ProAir HFA 90 mcg/inh inhalation aerosol: 2 puff(s) inhaled every 8 hours  Proscar 5 mg oral tablet: 1 tab(s) orally once a day  RisperDAL 2 mg oral tablet: 1 tab(s) orally once a day 8pm  Senna 8.6 mg oral tablet: 2 tab(s) orally once a day (at bedtime)  sevelamer carbonate 800 mg oral tablet: 1 tab(s) orally 3 times a day (with meals)  Simbrinza 1%- 0.2% ophthalmic suspension: 1 drop(s) to each affected eye 2 times a day Right eye  Symbicort 160 mcg-4.5 mcg/inh inhalation aerosol: 2 puff(s) inhaled 2 times a day  Synthroid 125 mcg (0.125 mg) oral tablet: 1 tab(s) orally once a day  tamsulosin 0.4 mg oral capsule: 1 cap(s) orally once a day  Tylenol 325 mg oral tablet: 2 tab(s) orally 2 times a day  Vitamin D3 25 mcg (1000 intl units) oral tablet: 1 tab(s) orally once a day  Zocor 20 mg oral tablet: 1 tab(s) orally once a day (at bedtime)   acetaminophen 325 mg oral tablet: 2 tab(s) orally every 6 hours, As needed, Mild Pain (1 - 3), Moderate Pain (4 - 6)  albuterol 90 mcg/inh inhalation aerosol: 2 puff(s) inhaled every 6 hours, As needed, Shortness of Breath and/or Wheezing  buPROPion 150 mg/24 hours (XL) oral tablet, extended release: 1 tab(s) orally once a day  calcitriol 0.25 mcg oral capsule: 1 cap(s) orally once a day  divalproex sodium 500 mg oral tablet, extended release: 1 tab(s) orally 2 times a day (6am and 6pm)  ferrous sulfate 325 mg (65 mg elemental iron) oral tablet: 1 tab(s) orally once a day  folic acid 1 mg oral tablet: 1 tab(s) orally once a day  ipratropium-albuterol 0.5 mg-2.5 mg/3 mL inhalation solution: 3 milliliter(s) inhaled every 6 hours  lidocaine 4% topical film: Apply topically to affected area once a day  pantoprazole 40 mg oral delayed release tablet: 1 tab(s) orally once a day (before a meal)  polyethylene glycol 3350 oral powder for reconstitution: 17 gram(s) orally once a day  predniSONE 10 mg oral tablet: 3 tab(s) orally once a day x 3 days ( from 7/22)  2 tab(s) orally once a day x 3 days  1 tab(s) orally once a day x 3 days  Proscar 5 mg oral tablet: 1 tab(s) orally once a day  RisperDAL 2 mg oral tablet: 1 tab(s) orally once a day 8pm  Senna 8.6 mg oral tablet: 2 tab(s) orally once a day (at bedtime)  sevelamer carbonate 800 mg oral tablet: 1 tab(s) orally 3 times a day (with meals)  Simbrinza 1%- 0.2% ophthalmic suspension: 1 drop(s) to each affected eye 2 times a day Right eye  Synthroid 125 mcg (0.125 mg) oral tablet: 1 tab(s) orally once a day  tamsulosin 0.4 mg oral capsule: 1 cap(s) orally once a day

## 2022-07-22 NOTE — DISCHARGE NOTE PROVIDER - PROVIDER TOKENS
PROVIDER:[TOKEN:[[id number]],FOLLOWUP:[1-3 days]],FREE:[LAST:[Facility MD at rehabilitation],PHONE:[(   )    -],FAX:[(   )    -],FOLLOWUP:[1-3 days]],PROVIDER:[TOKEN:[[id number]],FOLLOWUP:[2 weeks]]

## 2022-07-23 NOTE — DISCHARGE NOTE NURSING/CASE MANAGEMENT/SOCIAL WORK - PATIENT PORTAL LINK FT
You can access the FollowMyHealth Patient Portal offered by [facility] by registering at the following website: [email or web address]. By joining [employer]’s FollowMyHealth portal, you will also be able to view your health information using other applications (apps) compatible with our system.

## 2022-07-23 NOTE — PROGRESS NOTE ADULT - PROBLEM SELECTOR PLAN 2
see plan as above  not in exacerbation  will hold nicotine patch- quit smoking 11mns, no patch given this admission. patient denies wanting a cigarette.

## 2022-07-23 NOTE — PROGRESS NOTE ADULT - SUBJECTIVE AND OBJECTIVE BOX
Patient is a 62y old  Male who presents with a chief complaint of AHRF due to COPD exacerbation (23 Jul 2022 09:54)    PATIENT IS SEEN AND EXAMINED IN MEDICAL FLOOR.  SOWMYA [    ]    ALYSIA [   ]      GT [   ]    ALLERGIES:  No Known Allergies      Daily     Daily     VITALS:    Vital Signs Last 24 Hrs  T(C): 37.3 (23 Jul 2022 10:25), Max: 37.3 (23 Jul 2022 10:25)  T(F): 99.1 (23 Jul 2022 10:25), Max: 99.1 (23 Jul 2022 10:25)  HR: 102 (23 Jul 2022 10:25) (73 - 102)  BP: 137/56 (23 Jul 2022 10:25) (118/52 - 145/56)  BP(mean): --  RR: 19 (23 Jul 2022 10:25) (19 - 20)  SpO2: 96% (23 Jul 2022 10:25) (95% - 100%)    Parameters below as of 23 Jul 2022 10:25  Patient On (Oxygen Delivery Method): nasal cannula  O2 Flow (L/min): 2      LABS:    CBC Full  -  ( 22 Jul 2022 06:54 )  WBC Count : 7.61 K/uL  RBC Count : 2.32 M/uL  Hemoglobin : 7.3 g/dL  Hematocrit : 23.9 %  Platelet Count - Automated : 235 K/uL  Mean Cell Volume : 103.0 fl  Mean Cell Hemoglobin : 31.5 pg  Mean Cell Hemoglobin Concentration : 30.5 gm/dL  Auto Neutrophil # : x  Auto Lymphocyte # : x  Auto Monocyte # : x  Auto Eosinophil # : x  Auto Basophil # : x  Auto Neutrophil % : x  Auto Lymphocyte % : x  Auto Monocyte % : x  Auto Eosinophil % : x  Auto Basophil % : x      07-22    143  |  108  |  91<H>  ----------------------------<  117<H>  4.3   |  28  |  3.72<H>    Ca    9.9      22 Jul 2022 06:54  Phos  3.6     07-22  Mg     3.0     07-22    TPro  6.5  /  Alb  2.3<L>  /  TBili  0.2  /  DBili  x   /  AST  6<L>  /  ALT  10  /  AlkPhos  73  07-22    CAPILLARY BLOOD GLUCOSE            LIVER FUNCTIONS - ( 22 Jul 2022 06:54 )  Alb: 2.3 g/dL / Pro: 6.5 g/dL / ALK PHOS: 73 U/L / ALT: 10 U/L DA / AST: 6 U/L / GGT: x           Creatinine Trend: 3.72<--, 4.06<--, 4.66<--, 5.03<--, 5.20<--, 6.03<--  I&O's Summary    22 Jul 2022 07:01  -  23 Jul 2022 07:00  --------------------------------------------------------  IN: 0 mL / OUT: 2300 mL / NET: -2300 mL            ET Tube ET Tube  07-18 @ 06:18   Normal Respiratory Ivelisse present  --    No polymorphonuclear leukocytes seen per low power field  Moderate Squamous epithelial cells seen per low power field  Moderate Yeast like cells seen per oil power field  Few Gram positive cocci in pairs seen per oil power field  Rare Gram PositiveRods seen per oil power field  Rare Gram Negative Rods seen per oil power field  Rare Gram Negative Diplococci seen per oil power field      Clean Catch Clean Catch (Midstream)  07-17 @ 14:56   No growth  --  --      .Blood Blood-Peripheral  07-17 @ 14:46   No Growth Final  --  --          MEDICATIONS:    MEDICATIONS  (STANDING):  albuterol/ipratropium for Nebulization 3 milliLiter(s) Nebulizer every 6 hours  buPROPion XL (24-Hour) . 150 milliGRAM(s) Oral daily  calcitriol   Capsule 0.25 MICROGram(s) Oral daily  dextrose 5% + sodium chloride 0.45%. 1000 milliLiter(s) (60 mL/Hr) IV Continuous <Continuous>  diVALproex  milliGRAM(s) Oral <User Schedule>  ferrous    sulfate 325 milliGRAM(s) Oral daily  finasteride 5 milliGRAM(s) Oral daily  folic acid 1 milliGRAM(s) Oral daily  heparin   Injectable 5000 Unit(s) SubCutaneous every 8 hours  levothyroxine 125 MICROGram(s) Oral daily  lidocaine   4% Patch 1 Patch Transdermal daily  pantoprazole    Tablet 40 milliGRAM(s) Oral before breakfast  polyethylene glycol 3350 17 Gram(s) Oral daily  predniSONE   Tablet 30 milliGRAM(s) Oral daily  risperiDONE   Tablet 2 milliGRAM(s) Oral daily  senna 2 Tablet(s) Oral at bedtime  sevelamer carbonate 800 milliGRAM(s) Oral every 8 hours  tamsulosin 0.4 milliGRAM(s) Oral at bedtime      MEDICATIONS  (PRN):  acetaminophen     Tablet .. 650 milliGRAM(s) Oral every 6 hours PRN Mild Pain (1 - 3), Moderate Pain (4 - 6)  ALBUTerol    90 MICROgram(s) HFA Inhaler 2 Puff(s) Inhalation every 6 hours PRN Shortness of Breath and/or Wheezing      REVIEW OF SYSTEMS:                           ALL ROS DONE [ X   ]    CONSTITUTIONAL:  LETHARGIC [   ], FEVER [   ], UNRESPONSIVE [   ]  CVS:  CP  [   ], SOB, [   ], PALPITATIONS [   ], DIZZYNESS [   ]  RS: COUGH [   ], SPUTUM [   ]  GI: ABDOMINAL PAIN [   ], NAUSEA [   ], VOMITINGS [   ], DIARRHEA [   ], CONSTIPATION [   ]  :  DYSURIA [   ], NOCTURIA [   ], INCREASED FREQUENCY [   ], DRIBLING [   ],  SKELETAL: PAINFUL JOINTS [   ], SWOLLEN JOINTS [   ], NECK ACHE [   ], LOW BACK ACHE [   ],  SKIN : ULCERS [   ], RASH [   ], ITCHING [   ]  CNS: HEAD ACHE [   ], DOUBLE VISION [   ], BLURRED VISION [   ], AMS / CONFUSION [   ], SEIZURES [   ], WEAKNESS [   ],TINGLING / NUMBNESS [   ]      PHYSICAL EXAMINATION:  GENERAL APPEARANCE: NO DISTRESS  HEENT:  NO PALLOR, NO  JVD,  NO   NODES, NECK SUPPLE  CVS: S1 +, S2 +,   RS: AEEB,  OCCASIONAL  RALES +,   NO RONCHI  ABD: SOFT, NT, NO, BS +  EXT: NO PE  SKIN: WARM,   SKELETAL:  ROM ACCEPTABLE  CNS:  AAO X 2    RADIOLOGY :    ACC: [id number] EXAM:  XR CHEST PORTABLE URGENT 1V                        ACC: [id number] EXAM:  XR CHEST PORTABLE URGENT 1V                          PROCEDURE DATE:  07/17/2022          INTERPRETATION:  Portable chest radiograph    CLINICAL INFORMATION: Dyspnea, shortness of breath.    TECHNIQUE:  Portable  AP chest radiograph.    COMPARISON: 9/1/2021 chest .    FINDINGS:  CATHETERS AND TUBES: ET tube tip above tracheal bifurcation. NG tube tip   coiled within the hypopharynx. PULMONARY: LEFT basilar airspace disease   obscures diaphragm contour. Is mild vascular congestion.  .   No pneumothorax.    HEART/VASCULAR: The  heart is enlarged in transverse diameter. .    BONES: Visualized osseous structures are intact.    IMPRESSION:   LEFT basilarairspace disease.  ET tube tip above tracheal bifurcation.  NG tube coiled within the hypopharynx..    Follow-up AP portable chest radiograph 7/17/2022 at 12:59 PM:  ET tube tip above tracheal bifurcation.  NG tube tip beyond GE junction.  Residual LEFT basilar airspace consolidation and/or effusion obscuring   diaphragm contour. RIGHT basilar mild diffuse airspace disease and linear   atelectasis. Upper zones clear.  Cardiomegaly.      ASSESSMENT :       PLAN:  HPI:  Patient is AAOx0, intubated; So hx was obtained from previous charts and NH paperwork    Patient 62 years old Morbid obese male from [facility] ambulates with walker has PMHx of bph, ckd, and bipolar disorder, COPD not on O2 at home, hypothyroidism, anemia, left DVT, CKD, BPH, HLD, bipolar disorder, ambulatory dysfunction, lymphedema presented with shortness of breath and unresponsivness. The patient was sent here due to sweaty and shaky reaction to taking new Zocor pill. Patient was intubated in ED and ICU was called for Acute hypoxia respiratory failure likely secondary to COPD exacerbation.  (17 Jul 2022 11:15)    # CASE DISCUSSED AT LENGTH WITH SISTER JAEMS AT BEDSIDE. ALL QUESTIONS ANSWERED. PATIENT AND SISTER AGREEABLE FOR DISCHARGE TO [facility] FOR SUBACUTE REHAB.     # ACUTE HYPOXIC RESPIRATORY FAILURE W/ UNDERLYING COPD/TEJ W/ SUSPECTED ASPIRATION PNEUMONITIS  - S/P MECHANICAL VENTILATION  - ON NC  - ON BRONCHODILATORS, SYMBICORT  - S/P CEFEPIME, BCX - NGTD  - CRITICAL CARE CONSULT IN PROGRESS    # ACUTE METABOLIC ENCEPHALOPATHY LIKELY S/T HYPERCAPNIA  - CRITICAL CARE EVALUATION IN PROGRESS  - ON HOME DIVALPROEX    # SEPSIS S/T ASPIRATION PNEUMONITIS  - EMPIRICALLY ON CEFEPIME  - S/P VASOPRESSORS  - F/U BCX   - CRITICAL CARE CONSULT IN PROGRESS    # SHEYLA ON CKD  # BPH , URINARY RETENTION  - MONITORING CR  - AVOID NEPHROTOXIC AGENTS    - FAILED TOV, PLACED RE-PLACED HATFIELD    # PERIANAL SUBCUTANEOUS TISSUE BLEEDING  - CT A/P REVIEWED  - SURGERY CONSULT IN PROGRESS    # CLASS III OBESITY    # GI AND DVT PPX

## 2022-07-23 NOTE — PROGRESS NOTE ADULT - PROBLEM SELECTOR PLAN 1
secondary to COPD exacerbation and aspiration pneumonia  continue BiPAP at night and PRN during day  continue cefepime  continue albuterol neb and MDI PRN  continue prednisone taper  blood and urine culture negative

## 2022-07-23 NOTE — PROGRESS NOTE ADULT - SUBJECTIVE AND OBJECTIVE BOX
GAYLA PEARCE    SCU progress note    INTERVAL HPI/OVERNIGHT EVENTS: ***    DNR [ ]   DNI  [  ] Full code [x]    Covid - 19 PCR: negative 7/21    The 4Ms    What Matters Most: see GOC  Age appropriate Medications/Screen for High Risk Medication: Yes  Mentation: see CAM below  Mobility: defer to physical exam    The Confusion Assessment Method (CAM) Diagnostic Algorithm     1: Acute Onset or Fluctuating Course  - Is there evidence of an acute change in mental status from the patient’s baseline? Did the (abnormal) behavior  fluctuate during the day, that is, tend to come and go, or increase and decrease in severity?  [ ] YES [x ] NO     2: Inattention  - Did the patient have difficulty focusing attention, being easily distractible, or having difficulty keeping track of what was being said?  [ ] YES [x ] NO     3: Disorganized thinking  -Was the patient’s thinking disorganized or incoherent, such as rambling or irrelevant conversation, unclear or illogical flow of ideas, or unpredictable switching from subject to subject?  [ ] YES [x ] NO    4: Altered Level of consciousness?  [ ] YES [x ] NO    The diagnosis of delirium by CAM requires the presence of features 1 and 2 and either 3 or 4.    PRESSORS: [ ] YES [x ] NO    Cardiovascular:  Heart Failure  Acute   Acute on Chronic  Chronic       tamsulosin 0.4 milliGRAM(s) Oral at bedtime    Pulmonary:  ALBUTerol    90 MICROgram(s) HFA Inhaler 2 Puff(s) Inhalation every 6 hours PRN  albuterol/ipratropium for Nebulization 3 milliLiter(s) Nebulizer every 6 hours    Hematalogic:  heparin   Injectable 5000 Unit(s) SubCutaneous every 8 hours    Other:  acetaminophen     Tablet .. 650 milliGRAM(s) Oral every 6 hours PRN  buPROPion XL (24-Hour) . 150 milliGRAM(s) Oral daily  calcitriol   Capsule 0.25 MICROGram(s) Oral daily  dextrose 5% + sodium chloride 0.45%. 1000 milliLiter(s) IV Continuous <Continuous>  diVALproex  milliGRAM(s) Oral <User Schedule>  ferrous    sulfate 325 milliGRAM(s) Oral daily  finasteride 5 milliGRAM(s) Oral daily  folic acid 1 milliGRAM(s) Oral daily  levothyroxine 125 MICROGram(s) Oral daily  lidocaine   4% Patch 1 Patch Transdermal daily  pantoprazole    Tablet 40 milliGRAM(s) Oral before breakfast  polyethylene glycol 3350 17 Gram(s) Oral daily  predniSONE   Tablet 30 milliGRAM(s) Oral daily  risperiDONE   Tablet 2 milliGRAM(s) Oral daily  senna 2 Tablet(s) Oral at bedtime  sevelamer carbonate 800 milliGRAM(s) Oral every 8 hours    acetaminophen     Tablet .. 650 milliGRAM(s) Oral every 6 hours PRN  ALBUTerol    90 MICROgram(s) HFA Inhaler 2 Puff(s) Inhalation every 6 hours PRN  albuterol/ipratropium for Nebulization 3 milliLiter(s) Nebulizer every 6 hours  buPROPion XL (24-Hour) . 150 milliGRAM(s) Oral daily  calcitriol   Capsule 0.25 MICROGram(s) Oral daily  dextrose 5% + sodium chloride 0.45%. 1000 milliLiter(s) IV Continuous <Continuous>  diVALproex  milliGRAM(s) Oral <User Schedule>  ferrous    sulfate 325 milliGRAM(s) Oral daily  finasteride 5 milliGRAM(s) Oral daily  folic acid 1 milliGRAM(s) Oral daily  heparin   Injectable 5000 Unit(s) SubCutaneous every 8 hours  levothyroxine 125 MICROGram(s) Oral daily  lidocaine   4% Patch 1 Patch Transdermal daily  pantoprazole    Tablet 40 milliGRAM(s) Oral before breakfast  polyethylene glycol 3350 17 Gram(s) Oral daily  predniSONE   Tablet 30 milliGRAM(s) Oral daily  risperiDONE   Tablet 2 milliGRAM(s) Oral daily  senna 2 Tablet(s) Oral at bedtime  sevelamer carbonate 800 milliGRAM(s) Oral every 8 hours  tamsulosin 0.4 milliGRAM(s) Oral at bedtime    Drug Dosing Weight  Height (cm): 172.7 (17 Jul 2022 00:32)  Weight (kg): 126.8 (17 Jul 2022 10:20)  BMI (kg/m2): 42.5 (17 Jul 2022 10:20)  BSA (m2): 2.36 (17 Jul 2022 10:20)    CENTRAL LINE: [ ] YES [ x] NO  LOCATION:   DATE INSERTED:  REMOVE: [ ] YES [ ] NO  EXPLAIN:    HATFIELD: [ ] YES [x ] NO    DATE INSERTED:  REMOVE:  [ ] YES [ ] NO  EXPLAIN:    PAST MEDICAL & SURGICAL HISTORY:  Hypothyroid      Hyperlipidemia      Ambulatory dysfunction      Anemia      History of BPH      CKD (chronic kidney disease)      Chronic obstructive pulmonary disease (COPD)      Bipolar disorder      Mood disorder      HLD (hyperlipidemia)      BPH (benign prostatic hyperplasia)      Anemia      No significant past surgical history    07-22 @ 07:01  -  07-23 @ 07:00  --------------------------------------------------------  IN: 0 mL / OUT: 2300 mL / NET: -2300 mL    PHYSICAL EXAM:  GENERAL: NAD, well-groomed,  obese  HEAD:  Atraumatic, Normocephalic  EYES: EOMI, PERRLA, conjunctiva and sclera clear  ENMT: No tonsillar erythema, exudates, or enlargement; Moist mucous membranes, Good dentition, No lesions  NECK: Supple, No JVD, Normal thyroid  NERVOUS SYSTEM:  Alert & Oriented X 3,  Motor Strength 5/5 B/L upper and lower extremities; DTRs 2+ intact and symmetric  CHEST/LUNG: fair air entry b/l Eupneic on NC oxygen therapy ; No rales, rhonchi, wheezing, or rubs  HEART: Regular rate and rhythm; No murmurs, rubs, or gallops  ABDOMEN: Soft, Nontender, Nondistended; Bowel sounds present  EXTREMITIES:  2+ Peripheral Pulses, No clubbing, cyanosis, or edema  ; Hatfield in place, clear urine output  LYMPH: No lymphadenopathy noted  SKIN: No rashes or lesions, see wound care note, R. Ischial wound  Stage 1 Pressure Injury to the Bilateral Heels and Sacral area, as evident by non-blanchable erythema. b/l lower legs brownish discoloration with dry scabs. no open wound.         LABS:  CBC Full  -  ( 22 Jul 2022 06:54 )  WBC Count : 7.61 K/uL  RBC Count : 2.32 M/uL  Hemoglobin : 7.3 g/dL  Hematocrit : 23.9 %  Platelet Count - Automated : 235 K/uL  Mean Cell Volume : 103.0 fl  Mean Cell Hemoglobin : 31.5 pg  Mean Cell Hemoglobin Concentration : 30.5 gm/dL       07-22    143  |  108  |  91<H>  ----------------------------<  117<H>  4.3   |  28  |  3.72<H>    Ca    9.9      22 Jul 2022 06:54  Phos  3.6     07-22  Mg     3.0     07-22    TPro  6.5  /  Alb  2.3<L>  /  TBili  0.2  /  DBili  x   /  AST  6<L>  /  ALT  10  /  AlkPhos  73  07-22    [  ]  DVT Prophylaxis  [  ]  Nutrition, Brand, Rate         Goal Rate        Abnormal Nutritional Status -  Malnutrition   Cachexia      Morbid Obesity BMI >/=40    RADIOLOGY & ADDITIONAL STUDIES:     < from: Xray Chest 1 View- PORTABLE-Urgent (Xray Chest 1 View- PORTABLE-Urgent .) (07.19.22 @ 10:05) >    ACC: [id number] EXAM:  XR CHEST PORTABLE URGENT 1V                          PROCEDURE DATE:  07/19/2022          INTERPRETATION:  Portable chest radiograph    CLINICAL INFORMATION: Postextubation chest radiograph. Follow-up    TECHNIQUE:  Portable  AP chest radiograph.    COMPARISON: 7/18/2022 chest radiograph .    FINDINGS:  CATHETERS AND TUBES: None    PULMONARY: Residual RIGHT basilar airspace consolidation obscuring   diaphragm contour.  Mild bilateral perihilar diffuse airspace disease/. No pneumothorax.    HEART/VASCULAR:    The  heart is enlarged in transverse diameter.       BONES: Visualized osseous structures are intact.    IMPRESSION:   Residual RIGHT basilar airspace consolidation. LEFT   perihilar/basilar airspace disease.  .    --- End of Report ---      MAICO RUST MD; Attending Radiologist  This document has been electronically signed. Jul 20 2022  2:28PM    < end of copied text >      Goals of Care Discussion with Family/Proxy/Other   - see note from/family meeting set up for...     GAYLA PEARCE    SCU progress note    INTERVAL HPI/OVERNIGHT EVENTS: seen at bedside no complains, no overnight event    DNR [ ]   DNI  [  ] Full code [x]    Covid - 19 PCR: negative 7/21    The 4Ms    What Matters Most: see GO  Age appropriate Medications/Screen for High Risk Medication: Yes  Mentation: see CAM below  Mobility: defer to physical exam    The Confusion Assessment Method (CAM) Diagnostic Algorithm     1: Acute Onset or Fluctuating Course  - Is there evidence of an acute change in mental status from the patient’s baseline? Did the (abnormal) behavior  fluctuate during the day, that is, tend to come and go, or increase and decrease in severity?  [ ] YES [x ] NO     2: Inattention  - Did the patient have difficulty focusing attention, being easily distractible, or having difficulty keeping track of what was being said?  [ ] YES [x ] NO     3: Disorganized thinking  -Was the patient’s thinking disorganized or incoherent, such as rambling or irrelevant conversation, unclear or illogical flow of ideas, or unpredictable switching from subject to subject?  [ ] YES [x ] NO    4: Altered Level of consciousness?  [ ] YES [x ] NO    The diagnosis of delirium by CAM requires the presence of features 1 and 2 and either 3 or 4.    PRESSORS: [ ] YES [x ] NO    Cardiovascular:  Heart Failure  Acute   Acute on Chronic  Chronic       tamsulosin 0.4 milliGRAM(s) Oral at bedtime    Pulmonary:  ALBUTerol    90 MICROgram(s) HFA Inhaler 2 Puff(s) Inhalation every 6 hours PRN  albuterol/ipratropium for Nebulization 3 milliLiter(s) Nebulizer every 6 hours    Hematalogic:  heparin   Injectable 5000 Unit(s) SubCutaneous every 8 hours    Other:  acetaminophen     Tablet .. 650 milliGRAM(s) Oral every 6 hours PRN  buPROPion XL (24-Hour) . 150 milliGRAM(s) Oral daily  calcitriol   Capsule 0.25 MICROGram(s) Oral daily  dextrose 5% + sodium chloride 0.45%. 1000 milliLiter(s) IV Continuous <Continuous>  diVALproex  milliGRAM(s) Oral <User Schedule>  ferrous    sulfate 325 milliGRAM(s) Oral daily  finasteride 5 milliGRAM(s) Oral daily  folic acid 1 milliGRAM(s) Oral daily  levothyroxine 125 MICROGram(s) Oral daily  lidocaine   4% Patch 1 Patch Transdermal daily  pantoprazole    Tablet 40 milliGRAM(s) Oral before breakfast  polyethylene glycol 3350 17 Gram(s) Oral daily  predniSONE   Tablet 30 milliGRAM(s) Oral daily  risperiDONE   Tablet 2 milliGRAM(s) Oral daily  senna 2 Tablet(s) Oral at bedtime  sevelamer carbonate 800 milliGRAM(s) Oral every 8 hours    acetaminophen     Tablet .. 650 milliGRAM(s) Oral every 6 hours PRN  ALBUTerol    90 MICROgram(s) HFA Inhaler 2 Puff(s) Inhalation every 6 hours PRN  albuterol/ipratropium for Nebulization 3 milliLiter(s) Nebulizer every 6 hours  buPROPion XL (24-Hour) . 150 milliGRAM(s) Oral daily  calcitriol   Capsule 0.25 MICROGram(s) Oral daily  dextrose 5% + sodium chloride 0.45%. 1000 milliLiter(s) IV Continuous <Continuous>  diVALproex  milliGRAM(s) Oral <User Schedule>  ferrous    sulfate 325 milliGRAM(s) Oral daily  finasteride 5 milliGRAM(s) Oral daily  folic acid 1 milliGRAM(s) Oral daily  heparin   Injectable 5000 Unit(s) SubCutaneous every 8 hours  levothyroxine 125 MICROGram(s) Oral daily  lidocaine   4% Patch 1 Patch Transdermal daily  pantoprazole    Tablet 40 milliGRAM(s) Oral before breakfast  polyethylene glycol 3350 17 Gram(s) Oral daily  predniSONE   Tablet 30 milliGRAM(s) Oral daily  risperiDONE   Tablet 2 milliGRAM(s) Oral daily  senna 2 Tablet(s) Oral at bedtime  sevelamer carbonate 800 milliGRAM(s) Oral every 8 hours  tamsulosin 0.4 milliGRAM(s) Oral at bedtime    Drug Dosing Weight  Height (cm): 172.7 (17 Jul 2022 00:32)  Weight (kg): 126.8 (17 Jul 2022 10:20)  BMI (kg/m2): 42.5 (17 Jul 2022 10:20)  BSA (m2): 2.36 (17 Jul 2022 10:20)    CENTRAL LINE: [ ] YES [ x] NO  LOCATION:   DATE INSERTED:  REMOVE: [ ] YES [ ] NO  EXPLAIN:    HATFIELD: [ ] YES [x ] NO    DATE INSERTED:  REMOVE:  [ ] YES [ ] NO  EXPLAIN:    PAST MEDICAL & SURGICAL HISTORY:  Hypothyroid      Hyperlipidemia      Ambulatory dysfunction      Anemia      History of BPH      CKD (chronic kidney disease)      Chronic obstructive pulmonary disease (COPD)      Bipolar disorder      Mood disorder      HLD (hyperlipidemia)      BPH (benign prostatic hyperplasia)      Anemia      No significant past surgical history    07-22 @ 07:01  -  07-23 @ 07:00  --------------------------------------------------------  IN: 0 mL / OUT: 2300 mL / NET: -2300 mL    PHYSICAL EXAM:  GENERAL: NAD, well-groomed,  obese  HEAD:  Atraumatic, Normocephalic  EYES: EOMI, PERRLA, conjunctiva and sclera clear  ENMT: No tonsillar erythema, exudates, or enlargement; Moist mucous membranes, Good dentition, No lesions  NECK: Supple, No JVD, Normal thyroid  NERVOUS SYSTEM:  Alert & Oriented X 3,  Motor Strength 5/5 B/L upper and lower extremities; DTRs 2+ intact and symmetric  CHEST/LUNG: fair air entry b/l Eupneic on NC oxygen therapy ; No rales, rhonchi, wheezing, or rubs  HEART: Regular rate and rhythm; No murmurs, rubs, or gallops  ABDOMEN: Soft, Nontender, Nondistended; Bowel sounds present  EXTREMITIES:  2+ Peripheral Pulses, No clubbing, cyanosis, or edema  ; Hatfield in place, clear urine output  LYMPH: No lymphadenopathy noted  SKIN: No rashes or lesions, see wound care note, R. Ischial wound  Stage 1 Pressure Injury to the Bilateral Heels and Sacral area, as evident by non-blanchable erythema. b/l lower legs brownish discoloration with dry scabs. no open wound.         LABS:  CBC Full  -  ( 22 Jul 2022 06:54 )  WBC Count : 7.61 K/uL  RBC Count : 2.32 M/uL  Hemoglobin : 7.3 g/dL  Hematocrit : 23.9 %  Platelet Count - Automated : 235 K/uL  Mean Cell Volume : 103.0 fl  Mean Cell Hemoglobin : 31.5 pg  Mean Cell Hemoglobin Concentration : 30.5 gm/dL       07-22    143  |  108  |  91<H>  ----------------------------<  117<H>  4.3   |  28  |  3.72<H>    Ca    9.9      22 Jul 2022 06:54  Phos  3.6     07-22  Mg     3.0     07-22    TPro  6.5  /  Alb  2.3<L>  /  TBili  0.2  /  DBili  x   /  AST  6<L>  /  ALT  10  /  AlkPhos  73  07-22    [  ]  DVT Prophylaxis  [  ]  Nutrition, Brand, Rate         Goal Rate        Abnormal Nutritional Status -  Malnutrition   Cachexia      Morbid Obesity BMI >/=40    RADIOLOGY & ADDITIONAL STUDIES:     < from: Xray Chest 1 View- PORTABLE-Urgent (Xray Chest 1 View- PORTABLE-Urgent .) (07.19.22 @ 10:05) >    ACC: [id number] EXAM:  XR CHEST PORTABLE URGENT 1V                          PROCEDURE DATE:  07/19/2022          INTERPRETATION:  Portable chest radiograph    CLINICAL INFORMATION: Postextubation chest radiograph. Follow-up    TECHNIQUE:  Portable  AP chest radiograph.    COMPARISON: 7/18/2022 chest radiograph .    FINDINGS:  CATHETERS AND TUBES: None    PULMONARY: Residual RIGHT basilar airspace consolidation obscuring   diaphragm contour.  Mild bilateral perihilar diffuse airspace disease/. No pneumothorax.    HEART/VASCULAR:    The  heart is enlarged in transverse diameter.       BONES: Visualized osseous structures are intact.    IMPRESSION:   Residual RIGHT basilar airspace consolidation. LEFT   perihilar/basilar airspace disease.  .    --- End of Report ---      MAICO RUST MD; Attending Radiologist  This document has been electronically signed. Jul 20 2022  2:28PM    < end of copied text >      Goals of Care Discussion with Family/Proxy/Other   - see note from/family meeting set up for...

## 2022-07-23 NOTE — PROGRESS NOTE ADULT - PROBLEM SELECTOR PROBLEM 7
SHEYLA (acute kidney injury)

## 2022-07-23 NOTE — PROGRESS NOTE ADULT - REASON FOR ADMISSION
AHRF due to COPD
AHRF due to COPD exacerbation
AHRF due to COPD exacerbation
AHRF due to COPD, aspiration PNA

## 2022-07-23 NOTE — PROGRESS NOTE ADULT - PROBLEM SELECTOR PLAN 11
continue Risperdal, buproprion and Depakote

## 2022-07-23 NOTE — DISCHARGE NOTE NURSING/CASE MANAGEMENT/SOCIAL WORK - NSDCPEFALRISK_GEN_ALL_CORE
For information on Fall & Injury Prevention, visit: [email or web address] OR  [email or web address] OR  https://www.cdc.gov/steadi/patient.html

## 2022-07-23 NOTE — PROGRESS NOTE ADULT - PROBLEM SELECTOR PLAN 7
SHEYLA on CKD stage 4  likely due to prerenal due to hypotension in ED  baseline 3.8   mild hypernatremia-give free water, s/p low dose IVF x 12hr.  resolved  Creatinine 4.66--> 3.72    monitor BMP  consider nephrology consult if Creatinine continues to uptrend  will need outpatient follow up

## 2022-07-23 NOTE — PROGRESS NOTE ADULT - PROVIDER SPECIALTY LIST ADULT
Internal Medicine
Pulmonology
Surgery
Critical Care
Internal Medicine
Internal Medicine
Critical Care

## 2022-07-23 NOTE — PROGRESS NOTE ADULT - PROBLEM SELECTOR PLAN 10
left thigh pain- patient fell in a pot hole  continue Lidocaine patch  patient on dilaudid, however none during this admission, will hold off as patient developed unresponsiveness in ED- patient not complaining of pain

## 2022-07-23 NOTE — PROGRESS NOTE ADULT - PROBLEM SELECTOR PLAN 6
secondary anemia of chronic disease  H/H trending down from 8 to 7.1  no active bleeding currently   PRBC not given during this admission  transfuse as appropriate  Type and screen done 7/21  hold ASA for now and on discharge

## 2022-07-23 NOTE — PROGRESS NOTE ADULT - PROBLEM SELECTOR PLAN 5
hold Zocor as patient develop sweat and shaking  will need to follow up outpatient for further recommendations

## 2022-07-23 NOTE — PROGRESS NOTE ADULT - PROBLEM SELECTOR PLAN 3
not in exacerbation  Lasix held due to SHEYLA  continue aspirin  hold Zocor due to sweating and shaking
not in exacerbation  Lasix held due to SHEYLA  hold aspirin for anemia  hold Zocor for reaction due to sweating and shaking

## 2022-07-23 NOTE — PROGRESS NOTE ADULT - ASSESSMENT
"Cystoscopy    Date/Time: 10/27/2020 8:20 AM  Performed by: Darren Truong MD  Authorized by: Darren Truong MD     Consent Done?:  Yes (Written)  Time out: Immediately prior to procedure a "time out" was called to verify the correct patient, procedure, equipment, support staff and site/side marked as required.    Indications: hematuria    Position:  Supine  Anesthesia:  Intraurethral instillation  Patient sedated?: No    Preparation: Patient was prepped and draped in usual sterile fashion      Scope type:  Flexible cystoscope  External exam normal: Yes    Urethra normal: Yes    Prostate normal: Yes       The patient was brought to the procedure room placed on the table padded prepped and draped in usual sterile fashion in supine position. The cystoscope was inserted into the urethra and advanced the urethra was normal. The bladder was entered and inspected, it was found to be free of tumor stone or foreign body.  Bilateral ureteral orifices were identified and noted to be normal in appearance with clear efflux of urine at this point the scope was removed the patient tolerated the procedure well there were no complications.  Pelvic exam was performed and no abnormalities were noted.        " Patient is a 62 years old male from [facility], with PMH of COPD/TEJ- CPAP at night, CHF Grade 1 LV diastolic dysfunction, HLD, PAD, CKD 4, anemia of chronic disease, hypothyroidism, lymphedema,  Chronic Back Pain, OP, Polyarthritis, peripheral neuropathy. Patient presented to ED from assisted living after taking Zocor developed sweats and shakes, was to return to assisted living, however ED course complicated after patient became unresponsive for CO2 retention from COPD. Patient was emergently mechanically intubated and sedated in ED. ICU consulted.     Patient admitted to ICU for Acute Hypoxic Respiratory Failure Secondary to COPD exacerbation. Surgery consulted for perianal bleeding from punctate site, unexplained, no trauma. Suture placed in ED.   CT chest, abdomen and pelvis near complete atelectasis of the right lower lobe and segmental atelectasis of the left lower lobe with likely mucous plugging of the bilateral lower lobe airways. Underlying pneumonia is not excluded. Incompletely imaged cutaneous/subcutaneous thickening along the right inferior gluteal cleft and extending into the right perineum and towards the right perianal soft tissue, which is of uncertain etiology. The imaged portion measures 4.4 x 3.4 x 2.2 cm. No discrete fluid collection is visualized. Moderate colonic stool burden with mild distention of the rectum, likely reflecting constipation. No evidence of bowel obstruction.  During ICU stay patient was hemodynamically unstable requiring vasopressor support. TOV attempted however failed and Daley catheter reinserted, Restarted on BPH medication. Patient respiratory status improved and was extubated 7/20/22.   Patient downgraded to SCU for further medical management.  on Prednisone taper. given last dose antibiotic 7/22.   SHEYLA on CKD 4- Scr remains improving slowly to 3.72 from 6.0 (on admission) and lasix has been held. Patient is Euvolumic.   Followed by surgery team for perirectal wound, wound site healing well. Surgery recommends to continue local wound care with NS with Dry dressing, pt needs to follow up with colorectal surgeon dr. Cunningham outpatient and wound care team at nursing facility.  Patient with chronic anemia, no s/sx of bleeding. Fecal occult blood is negative. continue holding ASA on discharge and` monitoring CBC closely at facility.   Discontinued daley catheter and passed Trial of void. Continues Flomax and Proscar.   PT evaluated and recommends TG.  Last COVID PCR negative 7/22.  Patient is a 62 years old male from [facility], with PMH of COPD/TEJ- CPAP at night, CHF Grade 1 LV diastolic dysfunction, HLD, PAD, CKD 4, anemia of chronic disease, hypothyroidism, lymphedema,  Chronic Back Pain, OP, Polyarthritis, peripheral neuropathy. Patient presented to ED from assisted living after taking Zocor developed sweats and shakes, was to return to assisted living, however ED course complicated after patient became unresponsive for CO2 retention from COPD. Patient was emergently mechanically intubated and sedated in ED. ICU consulted.     Patient admitted to ICU for Acute Hypoxic Respiratory Failure Secondary to COPD exacerbation. Surgery consulted for perianal bleeding from punctate site, unexplained, no trauma. Suture placed in ED.   CT chest, abdomen and pelvis near complete atelectasis of the right lower lobe and segmental atelectasis of the left lower lobe with likely mucous plugging of the bilateral lower lobe airways. Underlying pneumonia is not excluded. Incompletely imaged cutaneous/subcutaneous thickening along the right inferior gluteal cleft and extending into the right perineum and towards the right perianal soft tissue, which is of uncertain etiology. The imaged portion measures 4.4 x 3.4 x 2.2 cm. No discrete fluid collection is visualized. Moderate colonic stool burden with mild distention of the rectum, likely reflecting constipation. No evidence of bowel obstruction.  During ICU stay patient was hemodynamically unstable requiring vasopressor support. TOV attempted however failed and Daley catheter reinserted, Restarted on BPH medication. Patient respiratory status improved and was extubated 7/20/22.   Patient downgraded to SCU for further medical management.  on Prednisone taper. given last dose antibiotic 7/22.   SHEYLA on CKD 4- Scr remains improving slowly to 3.72 from 6.0 (on admission) and lasix has been held. Patient is Euvolumic.   Followed by surgery team for perirectal wound, wound site healing well. Surgery recommends to continue local wound care with NS with Dry dressing, pt needs to follow up with colorectal surgeon dr. Cunningham outpatient and wound care team at nursing facility.  Patient with chronic anemia, no s/sx of bleeding. Fecal occult blood is negative. continue holding ASA on discharge and` monitoring CBC closely at facility.   Discontinued daley catheter and passed Trial of void. Continues Flomax and Proscar.   PT evaluated and recommends [facility].  Last COVID PCR negative 7/22.   Medically optimized for discharge to [facility].

## 2022-11-30 PROBLEM — E78.5 HYPERLIPIDEMIA, UNSPECIFIED: Chronic | Status: ACTIVE | Noted: 2022-01-01

## 2022-11-30 PROBLEM — N40.0 BENIGN PROSTATIC HYPERPLASIA WITHOUT LOWER URINARY TRACT SYMPTOMS: Chronic | Status: ACTIVE | Noted: 2022-01-01

## 2022-11-30 NOTE — H&P ADULT - NSICDXPASTMEDICALHX_GEN_ALL_CORE_FT
PAST MEDICAL HISTORY:  Ambulatory dysfunction     Anemia     Anemia     Bipolar disorder     BPH (benign prostatic hyperplasia)     Chronic obstructive pulmonary disease (COPD)     CKD (chronic kidney disease)     History of BPH     HLD (hyperlipidemia)     Hyperlipidemia     Hypothyroid     Mood disorder      PAST MEDICAL HISTORY:  Ambulatory dysfunction     Anemia     Bipolar disorder     BPH (benign prostatic hyperplasia)     Chronic obstructive pulmonary disease (COPD)     CKD (chronic kidney disease)     History of BPH     HLD (hyperlipidemia)     Hyperlipidemia     Hypothyroid      PAST MEDICAL HISTORY:  Ambulatory dysfunction     Anemia     Bipolar disorder     BPH (benign prostatic hyperplasia)     Chronic diastolic congestive heart failure     Chronic obstructive pulmonary disease (COPD)     CKD (chronic kidney disease)     History of BPH     HLD (hyperlipidemia)     Hyperlipidemia     Hypothyroid     Lymphedema

## 2022-11-30 NOTE — H&P ADULT - ATTENDING COMMENTS
Patient is a 61 y/o male with PMH of CKD ( baseline creat 3.8 ), COPD on recent prednisone taper, PVD, lymphedema, and Bipolar disorder admitted from [facility] after reported altered mental status. EMS was called and found the patient hypotensive ( 73/37 ) and bradycardic ( 40 ). The patient got 1 dose of atropine in the ED and 500cc IVF with improvement in both albeit transiently. The patient was transferred to my care by the ED attending. A call to the patient's nephrologist was made and he & I spoke regarding his need of lack of need for hemodialysis. The patient has an anion gap of 16. CXR significant only for right basilar atelectasis. potassium 4. The patient had a normal echo in Feb 2022 with the exception of an elevated Pa systolic pressure of 48 mmhg. normal aortic/mitral valves. Grade 1 diastolic dysfunction. He is morbidly obese  and hypoventilates while somnolent. ABG- 7.20/44/97 on 3L. We have accepted the patient to ICU and placed a central line for transduction of CVP ( 12mmhg ) and administration of vasopressors. Will also treat with stress dose steroids or more pointedly steroids for relative adrenal insufficiency in setting of sepsis. The patient has a h/o perirectal lesion which bleed on his last admission. He had a suture placed on general surgery did a procedure. I inspected the area and found broken skin over the perirectal area ( about the size of a 1/2 dollar ) with an adjacent area of eschar. No pus. The patient has dramatic edema of the lower extremities with punctate areas of erythema. Dx- hypotension, likely distributive shock with cellulitis. The patient has h/o RBBB and the bradycardia may be slow AFlutter/AFib. Will send cardiac enzymes every 8 hours. r/o inferior wall ischemia. Kefzol for antibiotic coverage. The levophed dose is 0.6 yamil/kg/min. BC x 2 sent. lactate sent.

## 2022-11-30 NOTE — ED PROVIDER NOTE - OBJECTIVE STATEMENT
62-year-old male with history of anemia of CKD, CKD, BPH, COPD on home O2, PVD, presents as he was poorly responsive at [facility], found by EMS to have blood pressure 73/37, heart rate in the 40s. Given 500 mL NS and 1 mg of atropine with significant improvement in vitals and mental status. Patient himself confirms that he was sent here due to confusion but states he feels entirely normal. Denies all symptoms including chest pain, shortness of breath, vomiting, diarrhea, urinary symptoms, cough, abdominal pain.

## 2022-11-30 NOTE — ED PROVIDER NOTE - CLINICAL SUMMARY MEDICAL DECISION MAKING FREE TEXT BOX
Pt with no CP/SOB. EF in February was nml. Pt with no abd pain or tenderness. No fever or specific infectious symptoms. No Sinus and no significant bradycardia and low suspicion for this as cause of symptoms.    Noted uremia and SHEYLA. D/w Dr. Valenzuela who has followed pt in the past, and will see him in the next few hrs, no acute intervention at this time. Given IVF Pt with no CP/SOB. EF in February was nml. Pt with no abd pain or tenderness. No fever or specific infectious symptoms. No Sinus and no significant bradycardia and low suspicion for this as cause of symptoms. Noted uremia and SHEYLA. D/w Dr. Valenzuela who has followed pt in the past, and will see him in the next few hrs, no acute intervention at this time. Given IVF with initial improvement. However then became hypotensive again and decreased responsiveness though still awakeable and able to have a conversation but drowsy. Giving more fluids and ICU starting peripheral pressors. Protecting airway.

## 2022-11-30 NOTE — ED PROVIDER NOTE - CARE PLAN
1 Principal Discharge DX:	Acute uremia  Secondary Diagnosis:	Acute renal failure  Secondary Diagnosis:	Metabolic acidosis   Principal Discharge DX:	Acute uremia  Secondary Diagnosis:	Acute renal failure  Secondary Diagnosis:	Metabolic acidosis  Secondary Diagnosis:	Hypotension

## 2022-11-30 NOTE — H&P ADULT - HISTORY OF PRESENT ILLNESS
62 year old male, from [facility], has past medical hx of ambulatory dysfunction, ACD, bipolar disorder, BPH, CKD, HLD, hypothyroidism was BIBEMS for hypotension and bradycardia. Story obtained from ED provider note as patient is not able to provide any history.  62 year old morbid obese male, from [facility], has past medical hx of ambulatory dysfunction, ACD, bipolar disorder, BPH, CKD, HLD, PVD,  COPD not on oxygen, hypothyroidism, RBBB, lymphedema, CHF (last echo 2/22 normal EF, GIDD) was BIBEMS for hypotension 73/37 mmHg and bradycardia (40's). Story obtained from ED provider note as patient is not able to provide any history. Patient received 500 cc NS and one dose of atropine for bradycardia. Patient very somnolent during interview but arousable to verbal stimuli.

## 2022-11-30 NOTE — ED PROVIDER NOTE - PHYSICAL EXAMINATION
Afebrile, hemodynamically stable, saturating well on NC  NAD, nontoxic appearing, sitting comfortably in bed, no WOB, speaking full sentences  Head NCAT  EOMI grossly, anicteric  MMM  No JVD  RRR, nml S1/S2, no m/r/g  Lungs CTAB, no w/r/r  Abd soft, NT, ND, nml BS, no rebound or guarding  Fully AAO, no dysarthria, CN's 3-12 grossly intact  BENDER spontaneously, b/l PVD  Skin warm, dry

## 2022-11-30 NOTE — CONSULT NOTE ADULT - ASSESSMENT
1. SHEYLA due to pre-renal azotemia from hypotension vs r/o ATN  -s/p 2L NS in the Er and ICU will start pressor if no improvement after fluid resuscitation. K is stable but acidosis present; would start bicarbonate drip if still worsening.  -no urgent need for HD however after fluid resuscitation and worsening patient's electrolytes then will consider HD.     -recommend: urinalysis, urine lytes (uosm, urine sodium, urine creatinine, chloride, potassium), spot protein to creatinine ratio  -order renal sono to assess kidney size and r/o hydronephrosis.   -Adjust meds to eGFR and avoid IV Gadolinium contrast,NSAIDs, and phosphate enema.  -Monitor I/O's daily.   -Monitor SMA daily.  2. CKD stage 4 most likely due to ischemic nephropathy  -baseline scr around 3.7mg/dL in july 2022.  -Keep patient euvolemic and renal diet  -Avoid Nephrotoxic Meds/ Agents such as (NSAIDs, IV contrast, Aminoglycosides such as gentamicin, -Gadolinium contrast, Phosphate containing enemas, etc..)  -Adjust Medications according to eGFR  3. Hypotension possible r/o sepsis vs cardiogenic  -s/p fluids and pressor as needed.  4. Mineral Bone Disease:  -Order phos, Vitamin 25 OH, and PTH intact in am  -continue calcitriol 0.25mcg daily and renvela tid once taking oral meds.  5. HAGMA and mild respiratory acidosis:  -abg noted.   -start bicarbonate drip and if no improvement then will consider dialysis.  6. Bradycardia due to unclear etiology  -trend troponins  -order TSH.     Patient is critically ill. Time Spent: 35mins.  Discussed the assessment and plan with ICU Team/Nurse     1. SHEYLA due to pre-renal azotemia from hypotension vs r/o ATN  -s/p 2L NS in the Er and ICU will start pressor if no improvement after fluid resuscitation. K is stable, good urine output but acidosis present; would start bicarbonate drip if still worsening.  -no urgent need for HD however after fluid resuscitation and worsening patient's electrolytes then will consider HD.     -recommend: urinalysis, urine lytes (uosm, urine sodium, urine creatinine, chloride, potassium), spot protein to creatinine ratio  -order renal sono to assess kidney size and r/o hydronephrosis.   -Adjust meds to eGFR and avoid IV Gadolinium contrast,NSAIDs, and phosphate enema.  -Monitor I/O's daily.   -Monitor SMA daily.  2. CKD stage 4 most likely due to ischemic nephropathy  -baseline scr around 3.7mg/dL in july 2022.  -Keep patient euvolemic and renal diet  -Avoid Nephrotoxic Meds/ Agents such as (NSAIDs, IV contrast, Aminoglycosides such as gentamicin, -Gadolinium contrast, Phosphate containing enemas, etc..)  -Adjust Medications according to eGFR  3. Hypotension possible r/o sepsis vs cardiogenic  -s/p fluids and pressor as needed.  4. Mineral Bone Disease:  -Order phos, Vitamin 25 OH, and PTH intact in am  -continue calcitriol 0.25mcg daily and renvela tid once taking oral meds.  5. HAGMA and mild respiratory acidosis:  -abg noted.   -start bicarbonate drip and if no improvement then will consider dialysis.  6. Bradycardia due to unclear etiology  -trend troponins  -order TSH.     Patient is critically ill. Time Spent: 35mins.  Discussed the assessment and plan with ICU Team/Nurse

## 2022-11-30 NOTE — PROCEDURE NOTE - NSPOSTPRCRAD_GEN_A_CORE
central line located in the/central line located in the superior vena cava/post-procedure radiography performed

## 2022-11-30 NOTE — H&P ADULT - ASSESSMENT
DX:  - Acute encephalopathy   - Sinus bradycardia  - HNAG Metabolic acidosis   - SHEYLA on CKD  -  -  -  -  -      =================== Neuro============================  Alert and oriented x 3 with confusion at times          ================= Cardiovascular==========================      ================- Pulm=================================      ==================ID===================================      ================= Nephro================================      =================GI====================================      ================ Heme==================================      =================Endocrine===============================      ================= Skin/Catheters============================  Peripheral IV lines   Viera catheter   Patient/Family consented for A line and CVC     =================Prophylaxis =============================  DVT prophylaxis with Heparin SQ daily   GI prophylaxis with PPI daily    ==================GOC==================================  FULL CODE            DX:  - Shock septic vs hypovolemia  - Acute encephalopathy   - Sinus bradycardia  - HNAG Metabolic acidosis   - SHEYLA on CKD  - Anemia   - COPD   - HLD   - Hypothyroidism   - Mood disorder       =================== Neuro============================  Alert and oriented x 3 with confusion at times    Pain meds with Tylenol     Mood disorder    ================= Cardiovascular==========================  Sinus bradycardia  - EKG sinus bradycardia, old RBBB  - F/u echo   - Monitor for now     ================- Pulm=================================  COPD  - Patient with hx of COPD not in exacerbation   - Chest X ray Showed bilateral patchy infiltrates   - Oxygen supplementation as needed to maintain SpO2 of 88-92%      ==================ID===================================  No acute issues      ================= Nephro================================  SHEYLA on CKD  - On admission, patient with Cr 6.4, baseline 3.7  - Worsening CKD  - Monitor I/O's daily  - Avoid nephrotoxins, NSAIDS, ACEI and ARBS  - Monitor BMP daily  - Nephro Dr Valenzuela consulted      =================GI====================================  No acute issues      ================ Heme==================================  Anemia   - Anemia of chronic disease   - Monitor CBC daily     =================Endocrine===============================  Hypothyroidism  - F/u TSH   - C/w home meds when not NPO     ================= Skin/Catheters============================  Peripheral IV lines   Viera catheter   Patient/Family consented for A line and CVC     =================Prophylaxis =============================  DVT prophylaxis with Heparin SQ daily   GI prophylaxis with PPI daily    ==================GOC==================================  FULL CODE          62 year old morbid obese male, from [facility], has past medical hx of ambulatory dysfunction, ACD, bipolar disorder, BPH, CKD, HLD, PVD, COPD not on oxygen, hypothyroidism, RBBB, lymphedema, CHF (last echo 2/22 normal EF, GIDD) was BIBEMS for hypotension 73/37 mmHg and bradycardia (40's). Patient admitted to ICU for shock and acute encephalopathy.     DX:  - Shock septic vs hypovolemia  - LE cellulitis   - Acute encephalopathy   - Sinus bradycardia  - HAGMA   - SHEYLA on CKD  - Anemia   - COPD not on oxygen at home   - HLD   - BPH  - Hypothyroidism   - Mood disorder       =================== Neuro============================  Alert and oriented x 3 with somnolence     Pain meds with Tylenol     Mood disorder  - C/w home meds     ================= Cardiovascular==========================  Sinus bradycardia  - EKG sinus bradycardia, old RBBB  - F/u echo, troponin   - Monitor for now   - Will give atropine if needed, pads at bedside     CHF  - Patient with hx of diastolic CHF  - Last echo 2/22 normal EF, GIDD  - F/u echo     ================- Pulm=================================  COPD  - Patient with hx of COPD not in exacerbation   - Chest X ray Showed bilateral patchy infiltrates, no concern for fluid overload    - Oxygen supplementation as needed to maintain SpO2 of 88-92%  - C/w home meds       ==================ID===================================  Shock  - Concern for sepsis vs hypovolemic   - Will start empirical treatment with Cefazolin for LE cellulitis   - F/u lactate, procalcitonin, Bcx, CK  - ID consulted Dr Constance       ================= Nephro================================  SHEYLA on CKD  - On admission, patient with Cr 6.4, baseline 3.7  - Worsening CKD most likely a combination of obstructive vs pre- renal   - Viera catheter placed in ED with 1000 cc of UO  - Monitor I/O's daily  - Avoid nephrotoxins, NSAIDS, ACEI and ARBS  - Monitor BMP daily  - Nephro Dr Valenzuela consulted    [facility]  - Most likely from uremia   - ABG 7.2/44/97/17  - Will give bicarb as needed   - Nephro Dr Valenzuela consulted    =================GI====================================  NPO for now, FS q 6hrs     ================ Heme==================================  Anemia   - Anemia of chronic disease   - Monitor CBC daily     =================Endocrine===============================  Hypothyroidism  - F/u TSH   - C/w home meds when not NPO     ================= Skin/Catheters============================  Peripheral IV lines   Viera catheter   Patient/Family consented for A line and CVC     =================Prophylaxis =============================  DVT prophylaxis with Heparin SQ daily   GI prophylaxis with PPI daily    ==================GOC==================================  FULL CODE          62 year old morbid obese male, from [facility], has past medical hx of ambulatory dysfunction, ACD, bipolar disorder, BPH, CKD, HLD, PVD, COPD not on oxygen, hypothyroidism, RBBB, lymphedema, CHF (last echo 2/22 normal EF, GIDD) was BIBEMS for hypotension 73/37 mmHg and bradycardia (40's). Patient admitted to ICU for shock and acute encephalopathy.     DX:  - Shock septic vs hypovolemia  - LE cellulitis   - Acute encephalopathy   - Sinus bradycardia  - HAGMA   - SHEYLA on CKD  - Anemia   - COPD not on oxygen at home   - HLD   - BPH  - Hypothyroidism   - Mood disorder       =================== Neuro============================  Alert and oriented x 3 with somnolence     Pain meds with Tylenol     Mood disorder  - C/w home meds     ================= Cardiovascular==========================  Sinus bradycardia  - EKG sinus bradycardia, old RBBB  - F/u echo, troponin   - Monitor for now   - Will give atropine if needed, pads at bedside     CHF  - Patient with hx of diastolic CHF  - Last echo 2/22 normal EF, GIDD  - F/u echo     ================- Pulm=================================  COPD  - Patient with hx of COPD not in exacerbation   - Chest X ray Showed bilateral patchy infiltrates, no concern for fluid overload    - Oxygen supplementation as needed to maintain SpO2 of 88-92%  - C/w home meds       ==================ID===================================  Shock  - Concern for sepsis vs hypovolemic   - Will start empirical treatment with Cefazolin for LE cellulitis   - S/p 4L NS   - Started on pressors   - F/u lactate, procalcitonin, Bcx, Ucx, CK  - ID consulted Dr Nolasco     ================= Nephro================================  SHEYLA on CKD  - On admission, patient with Cr 6.4, baseline 3.7  - Worsening CKD most likely a combination of obstructive vs pre- renal   - Viera catheter placed in ED with 1000 cc of UO  - Monitor I/O's daily  - Avoid nephrotoxins, NSAIDS, ACEI and ARBS  - Monitor BMP daily  - Nephro Dr Valenzuela consulted    HAGMA  - Most likely from uremia   - ABG 7.2/44/97/17  - Will give bicarb as needed   - Nephro Dr Valenzuela consulted    =================GI====================================  NPO for now, FS q 6hrs     ================ Heme==================================  Anemia   - Anemia of chronic disease   - Monitor CBC daily     =================Endocrine===============================  Hypothyroidism  - F/u TSH   - C/w home meds when not NPO     ================= Skin/Catheters============================  Peripheral IV lines   Viera catheter   Patient/Family consented for A line and CVC     =================Prophylaxis =============================  DVT prophylaxis with Heparin SQ daily   GI prophylaxis with PPI daily    ==================GOC==================================  FULL CODE

## 2022-11-30 NOTE — CHART NOTE - NSCHARTNOTEFT_GEN_A_CORE
Called Mr. Germán Rhoades, the brother, and explained that Mr. Bear Rhoades needed to be intubated.  Mr. Germán Rhoades agreed for the intubation of Mr. Bear Rhoades

## 2022-11-30 NOTE — CONSULT NOTE ADULT - SUBJECTIVE AND OBJECTIVE BOX
NEPHROLOGY MEDICAL CARE, [facility] - Dr. Taj Valenzuela/ Dr. Hazel Solis/ Dr. Cosmo Root/ Dr. Fang Gifford    Patient was seen and examined at bedside.     Consultation requested by:  López Vargas    Reason for Consult: SHEYLA on CKD and Acidosis    HPI:  Bri Weaver with past medical history of ambulates with walker has past medical hx of COPD not on O2 at home, hypothyroidism, anemia, left DVT, CKD, BPH, HLD, bipolar disorder, ambulatory dysfunction, lymphedema was brought to ED for worsening mental status and hypotension around 80s. Renal consulted for sheyla on ckd. Patient knowns where he is but seems disoriented. Pt was admitted with scr around 6.0mg/dL and 130s in bun and had scr around 3.7mg/dL in july 2022. Patient has similar issues in the past and his worsened to 6 then improved to 3s. in the Er, patient was bradycardic and hypotensive to 70s and given 500cc NS then given 2L. no other information provided.     PMH:     Hypothyroid    Hyperlipidemia    Schizophrenia    Schizoaffective disorder    Ambulatory dysfunction    Anemia    History of BPH    CKD (chronic kidney disease)    Chronic obstructive pulmonary disease (COPD)    Bipolar disorder    Bipolar disorder    Mood disorder    HLD (hyperlipidemia)    BPH (benign prostatic hyperplasia)    Anemia        PSH:       FAMILY HISTORY:      Social History:  non-smoker/ non-alcoholic     Home Meds:  Home Medications:  acetaminophen 325 mg oral tablet: 2 tab(s) orally every 6 hours, As needed, Mild Pain (1 - 3), Moderate Pain (4 - 6) (22 Jul 2022 18:44)  albuterol 90 mcg/inh inhalation aerosol: 2 puff(s) inhaled every 6 hours, As needed, Shortness of Breath and/or Wheezing (22 Jul 2022 18:44)  buPROPion 150 mg/24 hours (XL) oral tablet, extended release: 1 tab(s) orally once a day (22 Jul 2022 18:44)  calcitriol 0.25 mcg oral capsule: 1 cap(s) orally once a day (17 Jul 2022 12:06)  ferrous sulfate 325 mg (65 mg elemental iron) oral tablet: 1 tab(s) orally once a day (22 Jul 2022 18:44)  folic acid 1 mg oral tablet: 1 tab(s) orally once a day (17 Jul 2022 12:06)  ipratropium-albuterol 0.5 mg-2.5 mg/3 mL inhalation solution: 3 milliliter(s) inhaled every 6 hours (22 Jul 2022 18:44)  lidocaine 4% topical film: Apply topically to affected area once a day (17 Jul 2022 12:06)  pantoprazole 40 mg oral delayed release tablet: 1 tab(s) orally once a day (before a meal) (17 Jul 2022 12:06)  polyethylene glycol 3350 oral powder for reconstitution: 17 gram(s) orally once a day (22 Jul 2022 18:44)  Proscar 5 mg oral tablet: 1 tab(s) orally once a day (17 Jul 2022 12:06)  RisperDAL 2 mg oral tablet: 1 tab(s) orally once a day 8pm (17 Jul 2022 12:06)  Senna 8.6 mg oral tablet: 2 tab(s) orally once a day (at bedtime) (17 Jul 2022 12:06)  sevelamer carbonate 800 mg oral tablet: 1 tab(s) orally 3 times a day (with meals) (17 Jul 2022 12:06)  Simbrinza 1%- 0.2% ophthalmic suspension: 1 drop(s) to each affected eye 2 times a day Right eye (17 Jul 2022 12:06)  Synthroid 125 mcg (0.125 mg) oral tablet: 1 tab(s) orally once a day (17 Jul 2022 12:06)  tamsulosin 0.4 mg oral capsule: 1 cap(s) orally once a day (17 Jul 2022 12:06)      Allergies:  Allergies    No Known Allergies    Intolerances        REVIEW OF SYSTEMS:  -unable to obtain.    Vital Signs Last 24 Hrs  T(C): 37 (30 Nov 2022 13:36), Max: 37 (30 Nov 2022 13:36)  T(F): 98.6 (30 Nov 2022 13:36), Max: 98.6 (30 Nov 2022 13:36)  HR: 50 (30 Nov 2022 15:50) (50 - 55)  BP: 91/56 (30 Nov 2022 15:50) (89/42 - 100/63)  BP(mean): --  RR: 16 (30 Nov 2022 15:50) (16 - 22)  SpO2: 97% (30 Nov 2022 15:50) (96% - 97%)    Parameters below as of 30 Nov 2022 15:50  Patient On (Oxygen Delivery Method): nasal cannula            PHYSICAL EXAM:  General: No acute respiratory distress; obese.  Eyes: conjunctiva and sclera clear  ENMT: Atraumatic, Normocephalic, supple, No JVD present. Moist mucous membranes  Respiratory: Bilateral wheezing  Cardiovascular: S1S2+; no m/r/g  Gastrointestinal: Soft, Non-tender, Nondistended; Bowel sounds present,   Neuro:  Awake, Alert & Oriented X 2 (name and place), No focal deficits present.   Ext:  vascular skin changes of both legs; No Cyanosis  Skin: No visible rashes        LABS:                        8.1    9.52  )-----------( 234      ( 30 Nov 2022 13:50 )             25.5     11-30    136  |  101  |  130<H>  ----------------------------<  85  4.0   |  19<L>  |  6.44<H>    Ca    9.6      30 Nov 2022 13:50  Mg     2.9     11-30    TPro  6.8  /  Alb  2.1<L>  /  TBili  0.1<L>  /  DBili  x   /  AST  23  /  ALT  18  /  AlkPhos  98  11-30        Magnesium, Serum: 2.9 mg/dL *H* (11-30 @ 13:50)    Urine studies      Medications:  MEDICATIONS  (STANDING):  norepinephrine Infusion 0.08 MICROgram(s)/kG/Min (17.1 mL/Hr) IV Continuous <Continuous>    MEDICATIONS  (PRN):           NEPHROLOGY MEDICAL CARE, [facility] - Dr. Taj Valenzuela/ Dr. Hazel Solis/ Dr. Cosmo Root/ Dr. Fang Gifford    Patient was seen and examined at bedside.     Consultation requested by:  López Vargas    Reason for Consult: SHEYLA on CKD and Acidosis    HPI:  Bri Weaver with past medical history of ambulates with walker has past medical hx of COPD not on O2 at home, hypothyroidism, anemia, left DVT, CKD, BPH, HLD, bipolar disorder, ambulatory dysfunction, lymphedema was brought to ED for worsening mental status and hypotension around 80s. Renal consulted for sheyla on ckd. Patient knowns where he is but seems disoriented. Pt was admitted with scr around 6.0mg/dL and 130s in bun and had scr around 3.7mg/dL in july 2022. Patient has similar issues in the past and his worsened to 6 then improved to 3s. in the Er, patient was bradycardic and hypotensive to 70s and given 500cc NS then given 2L. no other information provided.     PMH:     Hypothyroid    Hyperlipidemia    Schizophrenia    Schizoaffective disorder    Ambulatory dysfunction    Anemia    History of BPH    CKD (chronic kidney disease)    Chronic obstructive pulmonary disease (COPD)    Bipolar disorder    Bipolar disorder    Mood disorder    HLD (hyperlipidemia)    BPH (benign prostatic hyperplasia)    Anemia        PSH:       FAMILY HISTORY:      Social History:  non-smoker/ non-alcoholic     Home Meds:  Home Medications:  acetaminophen 325 mg oral tablet: 2 tab(s) orally every 6 hours, As needed, Mild Pain (1 - 3), Moderate Pain (4 - 6) (22 Jul 2022 18:44)  albuterol 90 mcg/inh inhalation aerosol: 2 puff(s) inhaled every 6 hours, As needed, Shortness of Breath and/or Wheezing (22 Jul 2022 18:44)  buPROPion 150 mg/24 hours (XL) oral tablet, extended release: 1 tab(s) orally once a day (22 Jul 2022 18:44)  calcitriol 0.25 mcg oral capsule: 1 cap(s) orally once a day (17 Jul 2022 12:06)  ferrous sulfate 325 mg (65 mg elemental iron) oral tablet: 1 tab(s) orally once a day (22 Jul 2022 18:44)  folic acid 1 mg oral tablet: 1 tab(s) orally once a day (17 Jul 2022 12:06)  ipratropium-albuterol 0.5 mg-2.5 mg/3 mL inhalation solution: 3 milliliter(s) inhaled every 6 hours (22 Jul 2022 18:44)  lidocaine 4% topical film: Apply topically to affected area once a day (17 Jul 2022 12:06)  pantoprazole 40 mg oral delayed release tablet: 1 tab(s) orally once a day (before a meal) (17 Jul 2022 12:06)  polyethylene glycol 3350 oral powder for reconstitution: 17 gram(s) orally once a day (22 Jul 2022 18:44)  Proscar 5 mg oral tablet: 1 tab(s) orally once a day (17 Jul 2022 12:06)  RisperDAL 2 mg oral tablet: 1 tab(s) orally once a day 8pm (17 Jul 2022 12:06)  Senna 8.6 mg oral tablet: 2 tab(s) orally once a day (at bedtime) (17 Jul 2022 12:06)  sevelamer carbonate 800 mg oral tablet: 1 tab(s) orally 3 times a day (with meals) (17 Jul 2022 12:06)  Simbrinza 1%- 0.2% ophthalmic suspension: 1 drop(s) to each affected eye 2 times a day Right eye (17 Jul 2022 12:06)  Synthroid 125 mcg (0.125 mg) oral tablet: 1 tab(s) orally once a day (17 Jul 2022 12:06)  tamsulosin 0.4 mg oral capsule: 1 cap(s) orally once a day (17 Jul 2022 12:06)      Allergies:  Allergies    No Known Allergies    Intolerances        REVIEW OF SYSTEMS:  -unable to obtain.    Vital Signs Last 24 Hrs  T(C): 37 (30 Nov 2022 13:36), Max: 37 (30 Nov 2022 13:36)  T(F): 98.6 (30 Nov 2022 13:36), Max: 98.6 (30 Nov 2022 13:36)  HR: 50 (30 Nov 2022 15:50) (50 - 55)  BP: 91/56 (30 Nov 2022 15:50) (89/42 - 100/63)  BP(mean): --  RR: 16 (30 Nov 2022 15:50) (16 - 22)  SpO2: 97% (30 Nov 2022 15:50) (96% - 97%)    Parameters below as of 30 Nov 2022 15:50  Patient On (Oxygen Delivery Method): nasal cannula            PHYSICAL EXAM:  General: No acute respiratory distress; obese.  Eyes: conjunctiva and sclera clear  ENMT: Atraumatic, Normocephalic, supple, No JVD present. Moist mucous membranes  Respiratory: Bilateral wheezing  Cardiovascular: S1S2+; no m/r/g  Gastrointestinal: Soft, Non-tender, Nondistended; Bowel sounds present,   : daley's cath with yellowish urine.  Neuro:  Awake, Alert & Oriented X 2 (name and place), No focal deficits present.   Ext:  vascular skin changes of both legs; No Cyanosis  Skin: No visible rashes        LABS:                        8.1    9.52  )-----------( 234      ( 30 Nov 2022 13:50 )             25.5     11-30    136  |  101  |  130<H>  ----------------------------<  85  4.0   |  19<L>  |  6.44<H>    Ca    9.6      30 Nov 2022 13:50  Mg     2.9     11-30    TPro  6.8  /  Alb  2.1<L>  /  TBili  0.1<L>  /  DBili  x   /  AST  23  /  ALT  18  /  AlkPhos  98  11-30        Magnesium, Serum: 2.9 mg/dL *H* (11-30 @ 13:50)    Urine studies      Medications:  MEDICATIONS  (STANDING):  norepinephrine Infusion 0.08 MICROgram(s)/kG/Min (17.1 mL/Hr) IV Continuous <Continuous>    MEDICATIONS  (PRN):

## 2022-11-30 NOTE — H&P ADULT - CONVERSATION DETAILS
Spoke to HCP regrading advanced directives. He would like everything done if it will help his brother but if there is a point where patient will have poor quality of life then he will consider DNR DNI    Patient remains Full Code

## 2022-11-30 NOTE — ED ADULT NURSE REASSESSMENT NOTE - NS ED NURSE REASSESS COMMENT FT1
pt. lethargic but awake to name, oriented to person, place and situation .pt. report given to ICU RN.  pt. receiving levophed drip via right arm saline lock 20g.  IV site intact no signs of redness or pain. pt. transferred with ICU MD, on cardiac monitor. oxygen 2l nasal canula.

## 2022-11-30 NOTE — H&P ADULT - NSHPPHYSICALEXAM_GEN_ALL_CORE
ICU Vital Signs Last 24 Hrs  T(C): 37 (30 Nov 2022 13:36), Max: 37 (30 Nov 2022 13:36)  T(F): 98.6 (30 Nov 2022 13:36), Max: 98.6 (30 Nov 2022 13:36)  HR: 50 (30 Nov 2022 15:50) (50 - 55)  BP: 91/56 (30 Nov 2022 15:50) (89/42 - 100/63)  RR: 16 (30 Nov 2022 15:50) (16 - 22)  SpO2: 97% (30 Nov 2022 15:50) (96% - 97%)    O2 Parameters below as of 30 Nov 2022 15:50  Patient On (Oxygen Delivery Method): nasal cannula      GENERAL: NAD, obese, sat well on NC  HEAD:  Atraumatic, Normocephalic  EYES:  conjunctiva and sclera clear  NECK: Supple, No JVD, Normal thyroid  CHEST/LUNG: Clear to auscultation. Clear to percussion bilaterally; No rales, rhonchi, wheezing, or rubs  HEART: bradycardic, no murmurs   ABDOMEN: Soft, Nontender, Nondistended; Bowel sounds present, no pain or masses on palpation   NERVOUS SYSTEM:  Alert & Oriented X 2-3 with periods of confusion and lethargy  EXTREMITIES:  2+ Peripheral Pulses, No clubbing, or cyanosis, bilateral lymphedema, venous ulcers   : voiding well   SKIN: warm, dry ICU Vital Signs Last 24 Hrs  T(C): 37 (30 Nov 2022 13:36), Max: 37 (30 Nov 2022 13:36)  T(F): 98.6 (30 Nov 2022 13:36), Max: 98.6 (30 Nov 2022 13:36)  HR: 50 (30 Nov 2022 15:50) (50 - 55)  BP: 91/56 (30 Nov 2022 15:50) (89/42 - 100/63)  RR: 16 (30 Nov 2022 15:50) (16 - 22)  SpO2: 97% (30 Nov 2022 15:50) (96% - 97%)    O2 Parameters below as of 30 Nov 2022 15:50  Patient On (Oxygen Delivery Method): nasal cannula      GENERAL: NAD, obese, sat well on NC  HEAD:  Atraumatic, Normocephalic  EYES:  conjunctiva and sclera clear  NECK: Supple, No JVD, Normal thyroid  CHEST/LUNG: Clear to auscultation. Clear to percussion bilaterally; No rales, rhonchi, wheezing, or rubs  HEART: bradycardic, no murmurs   ABDOMEN: Soft, Nontender, Nondistended; Bowel sounds present, no pain or masses on palpation   NERVOUS SYSTEM:  Alert & Oriented X 2-3 with periods of confusion and somnolent   EXTREMITIES:  2+ Peripheral Pulses, No clubbing, or cyanosis, bilateral lymphedema associated with erythema and multiple blister like lesions, venous ulcers   : voiding well   SKIN: warm, dry, sacral and right thigh skin breakdown, no purulent secretion or malodor

## 2022-12-01 NOTE — ADVANCED PRACTICE NURSE CONSULT - ASSESSMENT
This is a 62yr old male patient admitted for Renal Failure, presenting with the following:  -There is evidence of Bilateral Lower Extremity wound, to which a Podiatry consultation will be placed for evaluation and treatment of this issue.  -There is evidence of Unstageable Pressure Injury to the R. Lat Thigh, R. Gluteus, and Coccyx areas with necrotic tissue, red tissue, drainage, strong odor, and periwound ertythema  -There is evidence of an intact DTI to the Bilateral Inner Thigh areas without drainage  -There is signs of skin failure  -The patient is with pressure injury prevention resources in place  -The patient is being treated for Shock septic vs hypovolemia, LE cellulitis, Acute encephalopathy, Sinus bradycardia, HAGMA, SHEYLA on CKD, Anemia, COPD, HLD, BPH, Hypothyroidism, Mood disorder

## 2022-12-01 NOTE — DIETITIAN INITIAL EVALUATION ADULT - OTHER INFO
Pt seen.. Discussed with CCM, this PM,  planning TF. Pt noted w/ multiple unstageable pressure injuries. Pt is from assisted living w/ hx ambulatory dysfunction, lymphedema, bipolar, CKD ("stage 4"), COPD (not on O2). Noted to be SHEYLA on CKD. Of note: seen by this RD (7/19/22) then 5'8, wt 121 kg

## 2022-12-01 NOTE — PROGRESS NOTE ADULT - ASSESSMENT
1. SHEYLA due to pre-renal azotemia from hypotension vs ATN  -Scr is improving to 5.0mg/dl with stable electrolytes. will hold off HD for now. questionable post obstructive uropathy due to increase UO.   -UA shows no infection. send urine lytes (uosm, urine sodium, urine creatinine, chloride, potassium), spot protein to creatinine ratio  -order renal sono to assess kidney size and r/o hydronephrosis when clincally stable.   -Adjust meds to eGFR and avoid IV Gadolinium contrast,NSAIDs, and phosphate enema.  -Monitor I/O's daily.   -Monitor SMA daily.  2. CKD stage 4 most likely due to ischemic nephropathy  -baseline scr around 3.7mg/dL in july 2022.  -Keep patient euvolemic and renal diet  -Avoid Nephrotoxic Meds/ Agents such as (NSAIDs, IV contrast, Aminoglycosides such as gentamicin, -Gadolinium contrast, Phosphate containing enemas, etc..)  -Adjust Medications according to eGFR  3. Hypotension possible sepsis  -bp is stable and on one pressor.  4. Mineral Bone Disease:  -phos is improving and f/u Vitamin 25 OH, and PTH intact  -continue calcitriol 0.25mcg daily and renvela tid once taking oral meds.  5. HAGMA and respiratory acidosis:  -abg noted.   -improved post intubation.   -on bicarbonate drip and if rpt abg improves then discontinue   6. Bradycardia due to unclear etiology  -improving heart rate.  -trend troponins  -TSH is nl  7. Pulm:  -s/p intubated on 11/30 for worsening respiratory support.  -continue vent support  -plan as per MICU.     Patient is critically ill. Time Spent: 35mins.  Discussed the assessment and plan with ICU Team/Nurse

## 2022-12-01 NOTE — PROGRESS NOTE ADULT - ASSESSMENT
62 year old morbid obese male, from [facility], has past medical hx of ambulatory dysfunction, ACD, bipolar disorder, BPH, CKD, HLD, PVD, COPD not on oxygen, hypothyroidism, RBBB, lymphedema, CHF (last echo 2/22 normal EF, GIDD) was BIBEMS for hypotension 73/37 mmHg and bradycardia (40's). Patient admitted to ICU for shock and acute encephalopathy.     DX:  - Shock septic vs hypovolemia  - LE cellulitis   - Acute encephalopathy   - Sinus bradycardia  - HAGMA   - SHEYLA on CKD  - Anemia   - COPD not on oxygen at home   - HLD   - BPH  - Hypothyroidism   - Mood disorder       =================== Neuro============================  Alert and oriented x 3 with somnolence     Pain meds with Tylenol     Mood disorder  - C/w home meds     ================= Cardiovascular==========================  Sinus bradycardia  - EKG sinus bradycardia, old RBBB  - F/u echo, troponin   - Monitor for now   - Will give atropine if needed, pads at bedside     CHF  - Patient with hx of diastolic CHF  - Last echo 2/22 normal EF, GIDD  - F/u echo     ================- Pulm=================================  COPD  - Patient with hx of COPD not in exacerbation   - Chest X ray Showed bilateral patchy infiltrates, no concern for fluid overload    - Oxygen supplementation as needed to maintain SpO2 of 88-92%  - C/w home meds       ==================ID===================================  Shock  - Concern for sepsis vs hypovolemic   - Will start empirical treatment with Cefazolin for LE cellulitis   - S/p 4L NS   - Started on pressors   - F/u lactate, procalcitonin, Bcx, Ucx, CK  - ID consulted Dr Nolasco     ================= Nephro================================  SHEYLA on CKD  - On admission, patient with Cr 6.4, baseline 3.7  - Worsening CKD most likely a combination of obstructive vs pre- renal   - Viera catheter placed in ED with 1000 cc of UO  - Monitor I/O's daily  - Avoid nephrotoxins, NSAIDS, ACEI and ARBS  - Monitor BMP daily  - Nephro Dr Valenzuela consulted    HAGMA  - Most likely from uremia   - ABG 7.2/44/97/17  - Will give bicarb as needed   - Nephro Dr Valenzuela consulted    =================GI====================================  NPO for now, FS q 6hrs     ================ Heme==================================  Anemia   - Anemia of chronic disease   - Monitor CBC daily     =================Endocrine===============================  Hypothyroidism  - F/u TSH   - C/w home meds when not NPO     ================= Skin/Catheters============================  Peripheral IV lines   Viera catheter   Patient/Family consented for A line and CVC     =================Prophylaxis =============================  DVT prophylaxis with Heparin SQ daily   GI prophylaxis with PPI daily    ==================GOC==================================  FULL CODE          62 year old morbid obese male, from [facility], has past medical hx of ambulatory dysfunction, ACD, bipolar disorder, BPH, CKD, HLD, PVD, COPD not on oxygen, hypothyroidism, RBBB, lymphedema, CHF (last echo 2/22 normal EF, GIDD) was BIBEMS for hypotension 73/37 mmHg and bradycardia (40's). Patient admitted to ICU for shock and acute encephalopathy.     DX:  - Shock septic vs hypovolemia  - LE cellulitis   - Acute encephalopathy   - Sinus bradycardia  - HAGMA   - SHEYLA on CKD  - Anemia   - COPD not on oxygen at home   - HLD   - BPH  - Hypothyroidism   - Mood disorder       =================== Neuro============================  Lethargic, unable to follow commands     Pain meds with Tylenol   On Fentanyl for Sedation/Pain    [ ] Follow up CT head    ================= Cardiovascular==========================  Sinus bradycardia  - EKG sinus bradycardia, old RBBB  - F/u echo, troponin; downtrending, normal EF  - Monitor for now   - Will give atropine if needed, pads at bedside   - Given normal EF, no changes in EKG, cardiogenic shock less likely    Hypotension  - On Levophed, Dopamine  - IV Fluids 75cc/hr    ================- Pulm=================================  COPD  - Patient with hx of COPD not in exacerbation   - Chest X ray Showed bilateral patchy infiltrates, no concern for fluid overload    - Oxygen supplementation as needed to maintain SpO2 of 88-92%  - C/w home meds       ==================ID===================================  Shock  - Concern for sepsis vs hypovolemic   - Empirical treatment with Cefepime for LE cellulitis and for pseudomonal coverage  - S/p 4L NS   - Started on pressors   [ ]  F/u lactate, procalcitonin, Bcx, Ucx, CK  - ID consulted Dr Nolasco     ================= Nephro================================  SHEYLA on CKD  - On admission, patient with Cr 6.4>>5.6, baseline 3.7  - Worsening CKD most likely a combination of obstructive vs pre- renal   - Viera catheter placed in ED with 1000 cc of UO  - -2200L in the past 24 hours  - Avoid nephrotoxins, NSAIDS, ACEI and ARBS  - Monitor BMP daily  - Nephro Dr Valenzuela consulted    HAGMA  - Most likely from uremia   - ABG 7.2/44/97/17 >>7.31/37/79/20  - s/p bicarb drip  - Nephro Dr Valenzuela consulted    =================GI====================================  NPO for now, FS q 6hrs   NGT Today    ================ Heme==================================  Anemia   - Anemia of chronic disease   - Monitor CBC daily   - Hgb Stable no signs of active bleed    =================Endocrine===============================  Hypothyroidism  - F/u TSH Normal   - C/w home meds when not NPO     ================= Skin/Catheters============================  Peripheral IV lines   Viera catheter   A line and CVC   Sacral and Posterior Thigh Ulcers, Wound Care Consulted    =================Prophylaxis =============================  DVT prophylaxis with Heparin SQ daily   GI prophylaxis with PPI daily    ==================GOC==================================  FULL CODE

## 2022-12-01 NOTE — PATIENT PROFILE ADULT - FALL HARM RISK - HARM RISK INTERVENTIONS

## 2022-12-01 NOTE — CONSULT NOTE ADULT - SUBJECTIVE AND OBJECTIVE BOX
Podiatry HPI: Patient is a 62y old  Male who presents with a chief complaint of Shock, Acute Encephalopathy (01 Dec 2022 17:27). Patient is seen in ICU bed, appears uncomfortable while intubated. Patient is unable to answer questions. Nurse bedside reports he does not like to be bothered. Patient has a history of sacral pressure ulcers. Unable to obtain further history.       HPI:  62 year old morbid obese male, from [facility], has past medical hx of ambulatory dysfunction, ACD, bipolar disorder, BPH, CKD, HLD, PVD,  COPD not on oxygen, hypothyroidism, RBBB, lymphedema, CHF (last echo 2/22 normal EF, GIDD) was BIBEMS for hypotension 73/37 mmHg and bradycardia (40's). Story obtained from ED provider note as patient is not able to provide any history. Patient received 500 cc NS and one dose of atropine for bradycardia. Patient very somnolent during interview but arousable to verbal stimuli.  (30 Nov 2022 18:39)      PMH:No pertinent past medical history    Hypothyroid    Hyperlipidemia    Schizophrenia    Schizoaffective disorder    Ambulatory dysfunction    Anemia    History of BPH    CKD (chronic kidney disease)    Chronic obstructive pulmonary disease (COPD)    Bipolar disorder    Bipolar disorder    Mood disorder    HLD (hyperlipidemia)    BPH (benign prostatic hyperplasia)    Anemia    Chronic diastolic congestive heart failure    Lymphedema      Allergies: No Known Allergies    Medications: albuterol    90 MICROgram(s) HFA Inhaler 2 Puff(s) Inhalation every 6 hours  azithromycin  IVPB 500 milliGRAM(s) IV Intermittent every 24 hours  cefTRIAXone   IVPB 1000 milliGRAM(s) IV Intermittent every 24 hours  chlorhexidine 0.12% Liquid 15 milliLiter(s) Oral Mucosa every 12 hours  chlorhexidine 2% Cloths 1 Application(s) Topical <User Schedule>  fentaNYL   Infusion. 0.5 MICROgram(s)/kG/Hr IV Continuous <Continuous>  heparin   Injectable 5000 Unit(s) SubCutaneous every 8 hours  hydrocortisone sodium succinate Injectable 50 milliGRAM(s) IV Push every 6 hours  lactated ringers. 1000 milliLiter(s) IV Continuous <Continuous>  levothyroxine Injectable 100 MICROGram(s) IV Push at bedtime  norepinephrine Infusion 0.05 MICROgram(s)/kG/Min IV Continuous <Continuous>  pantoprazole  Injectable 40 milliGRAM(s) IV Push daily  valproate sodium  IVPB 500 milliGRAM(s) IV Intermittent every 12 hours    FH:No pertinent family history in first degree relatives      PSX: No significant past surgical history    No significant past surgical history      SH: albuterol    90 MICROgram(s) HFA Inhaler 2 Puff(s) Inhalation every 6 hours  azithromycin  IVPB 500 milliGRAM(s) IV Intermittent every 24 hours  cefTRIAXone   IVPB 1000 milliGRAM(s) IV Intermittent every 24 hours  chlorhexidine 0.12% Liquid 15 milliLiter(s) Oral Mucosa every 12 hours  chlorhexidine 2% Cloths 1 Application(s) Topical <User Schedule>  fentaNYL   Infusion. 0.5 MICROgram(s)/kG/Hr IV Continuous <Continuous>  heparin   Injectable 5000 Unit(s) SubCutaneous every 8 hours  hydrocortisone sodium succinate Injectable 50 milliGRAM(s) IV Push every 6 hours  lactated ringers. 1000 milliLiter(s) IV Continuous <Continuous>  levothyroxine Injectable 100 MICROGram(s) IV Push at bedtime  norepinephrine Infusion 0.05 MICROgram(s)/kG/Min IV Continuous <Continuous>  pantoprazole  Injectable 40 milliGRAM(s) IV Push daily  valproate sodium  IVPB 500 milliGRAM(s) IV Intermittent every 12 hours      Vital Signs Last 24 Hrs  T(C): 37.2 (01 Dec 2022 18:15), Max: 37.9 (01 Dec 2022 13:15)  T(F): 99 (01 Dec 2022 18:15), Max: 100.2 (01 Dec 2022 13:15)  HR: 74 (01 Dec 2022 18:15) (45 - 85)  BP: 124/36 (01 Dec 2022 18:00) (74/38 - 124/55)  BP(mean): 59 (01 Dec 2022 18:00) (43 - 73)  RR: 25 (01 Dec 2022 18:15) (15 - 28)  SpO2: 100% (01 Dec 2022 18:15) (81% - 100%)    Parameters below as of 01 Dec 2022 07:00  Patient On (Oxygen Delivery Method): ventilator    O2 Concentration (%): 50    LABS                        9.2    14.04 )-----------( 273      ( 01 Dec 2022 06:03 )             27.6               12-01    138  |  106  |  124<H>  ----------------------------<  112<H>  4.0   |  20<L>  |  5.82<H>    Ca    8.8      01 Dec 2022 15:30  Phos  5.3     12-01  Mg     2.5     12-01    TPro  6.2  /  Alb  1.9<L>  /  TBili  0.2  /  DBili  x   /  AST  22  /  ALT  24  /  AlkPhos  83  12-01      ROS  REVIEW OF SYSTEMS:    CONSTITUTIONAL: unremarkable outside of PE      PHYSICAL EXAM  Vasc:  DP and PT pulses non palpable due to edema. TG: warm to warm. Pedal hair absent. Bilateral non pitting edema.   Derm: b/l venous stasis changes to skin with hemosiderin deposits. Peau d'orange appearance of skin due to venous stasis weeping b/l. Small superficial fibrosis due to venous stasis. Skin slough due to edema. No purulence or drainage noted at b/l legs on exam. No infected wounds on inspection of b/l lower extremity; no rash noted at b/l legs. Mild IDM noted in all interspaces. Hypertrophic and elongated toenails x 10  Neuro: unable to assess  MSK: unable to assess        A:  Chronic bilateral venous stasis with hemosiderin deposits in skin    P:   Patient evaluated and Chart reviewed   Unable to discuss diagnosis with patient  Inspected b/l extremities for signs of infection, none found  Continue with IV antibiotics As Per ID  Recommend CAIR boots to avoid pressure ulcers  Podiatry to follow while in house.  Outpatient nail care as needed  Discussed with Attending Dr. Navas

## 2022-12-01 NOTE — PROGRESS NOTE ADULT - SUBJECTIVE AND OBJECTIVE BOX
NEPHROLOGY MEDICAL CARE, [facility] - Dr. Taj Valenzuela/ Dr. Hazel Solis/ Dr. Cosmo Root/ Dr. Fang Gifford    Patient was seen and examined at bedside.    CC: patient was intubated overnight for worsening respiratory status.    Vital Signs Last 24 Hrs  T(C): 37.8 (01 Dec 2022 13:06), Max: 37.8 (01 Dec 2022 13:04)  T(F): 100 (01 Dec 2022 13:06), Max: 100 (01 Dec 2022 13:04)  HR: 67 (01 Dec 2022 13:06) (45 - 75)  BP: 111/45 (01 Dec 2022 13:04) (58/26 - 124/55)  BP(mean): 61 (01 Dec 2022 13:) (33 - 73)  RR: 19 (01 Dec 2022 13:) (15 - 22)  SpO2: 98% (01 Dec 2022 13:06) (81% - 100%)    Parameters below as of 01 Dec 2022 07:00  Patient On (Oxygen Delivery Method): ventilator    O2 Concentration (%): 50    11-30 @ :  -   @ 07:00  --------------------------------------------------------  IN: 2083.3 mL / OUT: 4350 mL / NET: -2266.7 mL     @ 07: @ 13:29  --------------------------------------------------------  IN: 550.1 mL / OUT: 375 mL / NET: 175.1 mL        PHYSICAL EXAM:  General: No acute respiratory distress on vent; obese.  Eyes: conjunctiva and sclera clear  ENMT: Atraumatic, Normocephalic; ET on vent  Respiratory: Bilateral poor air entry  Cardiovascular: S1S2+; no m/r/g  Gastrointestinal: Soft, Non-tender, Nondistended; Bowel sounds present,   : daley's cath with yellowish urine.  Neuro:  sedated.  Ext:  vascular skin changes of both legs; No Cyanosis  Skin: No visible rashes      MEDICATIONS:  MEDICATIONS  (STANDING):  albuterol    90 MICROgram(s) HFA Inhaler 2 Puff(s) Inhalation every 6 hours  cefepime   IVPB      cefepime   IVPB 1000 milliGRAM(s) IV Intermittent every 8 hours  chlorhexidine 0.12% Liquid 15 milliLiter(s) Oral Mucosa every 12 hours  chlorhexidine 2% Cloths 1 Application(s) Topical <User Schedule>  fentaNYL   Infusion. 0.5 MICROgram(s)/kG/Hr (5.7 mL/Hr) IV Continuous <Continuous>  heparin   Injectable 5000 Unit(s) SubCutaneous every 8 hours  hydrocortisone sodium succinate Injectable 50 milliGRAM(s) IV Push every 6 hours  lactated ringers. 1000 milliLiter(s) (75 mL/Hr) IV Continuous <Continuous>  norepinephrine Infusion 0.05 MICROgram(s)/kG/Min (5.34 mL/Hr) IV Continuous <Continuous>  pantoprazole  Injectable 40 milliGRAM(s) IV Push daily  valproate sodium  IVPB 500 milliGRAM(s) IV Intermittent every 12 hours    MEDICATIONS  (PRN):          LABS:                        9.2    14.04 )-----------( 273      ( 01 Dec 2022 06:03 )             27.6     12    141  |  106  |  121<H>  ----------------------------<  139<H>  3.3<L>   |  20<L>  |  5.68<H>    Ca    9.2      01 Dec 2022 06:03  Phos  5.3     12  Mg     2.5     12    TPro  6.5  /  Alb  1.9<L>  /  TBili  0.2  /  DBili  x   /  AST  27  /  ALT  26  /  AlkPhos  93  12    PT/INR - ( 01 Dec 2022 00:49 )   PT: 11.8 sec;   INR: 0.99 ratio         PTT - ( 01 Dec 2022 00:49 )  PTT:35.0 sec  Urinalysis Basic - ( 01 Dec 2022 00:14 )    Color: Yellow / Appearance: Clear / S.005 / pH: x  Gluc: x / Ketone: Negative  / Bili: Negative / Urobili: Negative   Blood: x / Protein: 30 mg/dL / Nitrite: Negative   Leuk Esterase: Negative / RBC: 2-5 /HPF / WBC 3-5 /HPF   Sq Epi: x / Non Sq Epi: Few /HPF / Bacteria: Few /HPF      Magnesium, Serum: 2.5 mg/dL ( @ 06:03)  Phosphorus Level, Serum: 5.3 mg/dL ( @ 06:03)  Magnesium, Serum: 2.6 mg/dL ( @ 00:14)  Phosphorus Level, Serum: 6.7 mg/dL ( @ 00:14)  Magnesium, Serum: 2.9 mg/dL ( @ 13:50)    Urine studies    PTH and Vit D:

## 2022-12-01 NOTE — DIETITIAN INITIAL EVALUATION ADULT - NSFNSPHYEXAMSKINFT_GEN_A_CORE
Pressure Injury 1: Right:,lateral,thigh, Unstageable  Pressure Injury 2: Right:,buttocks, Unstageable  Pressure Injury 3: coccyx, Unstageable  Pressure Injury 4: none, none  Pressure Injury 5: none, none  Pressure Injury 6: none, none  Pressure Injury 7: none, none  Pressure Injury 8: none, none  Pressure Injury 9: none, none  Pressure Injury 10: none, none  Pressure Injury 11: none, none, Pressure Injury 1: Right:,lateral,thigh, Unstageable  Pressure Injury 2: Right:,buttocks, Unstageable  Pressure Injury 3: coccyx, Unstageable  Pressure Injury 4: none, none  Pressure Injury 5: none, none  Pressure Injury 6: none, none  Pressure Injury 7: none, none  Pressure Injury 8: none, none  Pressure Injury 9: none, none  Pressure Injury 10: none, none  Pressure Injury 11: none, none

## 2022-12-01 NOTE — DIETITIAN INITIAL EVALUATION ADULT - PERTINENT LABORATORY DATA
12-01    141  |  106  |  121<H>  ----------------------------<  139<H>  3.3<L>   |  20<L>  |  5.68<H>    Ca    9.2      01 Dec 2022 06:03  Phos  5.3     12-01  Mg     2.5     12-01    TPro  6.5  /  Alb  1.9<L>  /  TBili  0.2  /  DBili  x   /  AST  27  /  ALT  26  /  AlkPhos  93  12-01  A1C with Estimated Average Glucose Result: 5.2 % (02-15-22 @ 09:59)

## 2022-12-01 NOTE — DIETITIAN INITIAL EVALUATION ADULT - NSICDXPASTMEDICALHX_GEN_ALL_CORE_FT
PAST MEDICAL HISTORY:  Ambulatory dysfunction     Anemia     Bipolar disorder     BPH (benign prostatic hyperplasia)     Chronic diastolic congestive heart failure     Chronic obstructive pulmonary disease (COPD)     CKD (chronic kidney disease)     History of BPH     HLD (hyperlipidemia)     Hyperlipidemia     Hypothyroid     Lymphedema

## 2022-12-01 NOTE — ADVANCED PRACTICE NURSE CONSULT - RECOMMEDATIONS
-Clean all wounds with normal saline and apply skin prep to the surrounding skin  -Apply Collagenase ointment to the necrotic and slough areas of the R. Lat Thigh, Coccyx, and R. Gluteal wounds, apply saline moistened gauze to the wound bed, and cover with a Foam dressing Daily PRN  -Apply Zinc Oxide Moisture Barrier Cream to the Bilateral Inner Thigh wounds b.i.d. PRN  -Frequent toileting   -Elevate/float the patients heels using heel protectors and reposition the patient Q 2hrs using wedges or pillows

## 2022-12-01 NOTE — CONSULT NOTE ADULT - ASSESSMENT
Pneumonia - CAP  Resp failure  Septic shock  fevers  Leukocytosis  Pustular rash      Plan - Cont Rocephin 1 gm iv q24hrs  Cont Azithromycin 500mgs iv q24hrs  Cont Pressor support

## 2022-12-01 NOTE — DIETITIAN INITIAL EVALUATION ADULT - PERTINENT MEDS FT
MEDICATIONS  (STANDING):  albuterol    90 MICROgram(s) HFA Inhaler 2 Puff(s) Inhalation every 6 hours  cefepime   IVPB      cefepime   IVPB 1000 milliGRAM(s) IV Intermittent every 8 hours  chlorhexidine 0.12% Liquid 15 milliLiter(s) Oral Mucosa every 12 hours  chlorhexidine 2% Cloths 1 Application(s) Topical <User Schedule>  fentaNYL   Infusion. 0.5 MICROgram(s)/kG/Hr (5.7 mL/Hr) IV Continuous <Continuous>  heparin   Injectable 5000 Unit(s) SubCutaneous every 8 hours  hydrocortisone sodium succinate Injectable 50 milliGRAM(s) IV Push every 6 hours  lactated ringers. 1000 milliLiter(s) (75 mL/Hr) IV Continuous <Continuous>  levothyroxine Injectable 100 MICROGram(s) IV Push at bedtime  norepinephrine Infusion 0.05 MICROgram(s)/kG/Min (5.34 mL/Hr) IV Continuous <Continuous>  pantoprazole  Injectable 40 milliGRAM(s) IV Push daily  valproate sodium  IVPB 500 milliGRAM(s) IV Intermittent every 12 hours    MEDICATIONS  (PRN):

## 2022-12-01 NOTE — CONSULT NOTE ADULT - SKIN COMMENTS
nodular rash over both lower exts with some pus filled pockets much better than yesterday, no warmth of skin , chronic mild erythema

## 2022-12-01 NOTE — DIETITIAN INITIAL EVALUATION ADULT - OTHER CALCULATIONS
Note: Protein levels as tolerated, re: renal fx (Consideration for ICU recs, wound care, intubation w/ BMI>30) and ?ht

## 2022-12-01 NOTE — CONSULT NOTE ADULT - SUBJECTIVE AND OBJECTIVE BOX
HPI:  62 year old morbid obese male, from [facility], has past medical hx of ambulatory dysfunction, ACD, bipolar disorder, BPH, CKD, HLD, PVD,  COPD not on oxygen, hypothyroidism, RBBB, lymphedema, CHF (last echo  normal EF, GIDD) was BIBEMS for hypotension 73/37 mmHg and bradycardia (40's). Story obtained from ED provider note as patient is not able to provide any history. Patient received 500 cc NS and one dose of atropine for bradycardia. Patient very somnolent during interview but arousable to verbal stimuli.  (2022 18:39)      PAST MEDICAL & SURGICAL HISTORY:  Hypothyroid      Hyperlipidemia      Ambulatory dysfunction      Anemia      History of BPH      CKD (chronic kidney disease)      Chronic obstructive pulmonary disease (COPD)      Bipolar disorder      HLD (hyperlipidemia)      BPH (benign prostatic hyperplasia)      Chronic diastolic congestive heart failure      Lymphedema      No significant past surgical history          No Known Allergies      Meds:  albuterol    90 MICROgram(s) HFA Inhaler 2 Puff(s) Inhalation every 6 hours  chlorhexidine 0.12% Liquid 15 milliLiter(s) Oral Mucosa every 12 hours  chlorhexidine 2% Cloths 1 Application(s) Topical <User Schedule>  fentaNYL   Infusion. 0.5 MICROgram(s)/kG/Hr IV Continuous <Continuous>  heparin   Injectable 5000 Unit(s) SubCutaneous every 8 hours  hydrocortisone sodium succinate Injectable 50 milliGRAM(s) IV Push every 6 hours  lactated ringers. 1000 milliLiter(s) IV Continuous <Continuous>  levothyroxine Injectable 100 MICROGram(s) IV Push at bedtime  norepinephrine Infusion 0.05 MICROgram(s)/kG/Min IV Continuous <Continuous>  pantoprazole  Injectable 40 milliGRAM(s) IV Push daily  valproate sodium  IVPB 500 milliGRAM(s) IV Intermittent every 12 hours      SOCIAL HISTORY:  Smoker:  YES / NO        PACK YEARS:                         WHEN QUIT?  ETOH use:  YES / NO               FREQUENCY / QUANTITY:  Ilicit Drug use:  YES / NO  Occupation:  Assisted device use (Cane / Walker):  Live with:    FAMILY HISTORY:      VITALS:  Vital Signs Last 24 Hrs  T(C): 37.5 (01 Dec 2022 16:30), Max: 37.9 (01 Dec 2022 13:15)  T(F): 99.5 (01 Dec 2022 16:30), Max: 100.2 (01 Dec 2022 13:15)  HR: 80 (01 Dec 2022 16:30) (45 - 85)  BP: 103/44 (01 Dec 2022 16:00) (58/26 - 124/55)  BP(mean): 59 (01 Dec 2022 16:00) (33 - 73)  RR: 28 (01 Dec 2022 16:30) (15 - 28)  SpO2: 97% (01 Dec 2022 16:30) (81% - 100%)    Parameters below as of 01 Dec 2022 07:00  Patient On (Oxygen Delivery Method): ventilator    O2 Concentration (%): 50    LABS/DIAGNOSTIC TESTS:                          9.2    14.04 )-----------( 273      ( 01 Dec 2022 06:03 )             27.6     WBC Count: 14.04 K/uL ( @ 06:03)  WBC Count: 14.25 K/uL ( @ 00:14)  WBC Count: 9.52 K/uL ( @ 13:50)          138  |  106  |  124<H>  ----------------------------<  112<H>  4.0   |  20<L>  |  5.82<H>    Ca    8.8      01 Dec 2022 15:30  Phos  5.3       Mg     2.5         TPro  6.2  /  Alb  1.9<L>  /  TBili  0.2  /  DBili  x   /  AST  22  /  ALT  24  /  AlkPhos  83  12      Urinalysis Basic - ( 01 Dec 2022 00:14 )    Color: Yellow / Appearance: Clear / S.005 / pH: x  Gluc: x / Ketone: Negative  / Bili: Negative / Urobili: Negative   Blood: x / Protein: 30 mg/dL / Nitrite: Negative   Leuk Esterase: Negative / RBC: 2-5 /HPF / WBC 3-5 /HPF   Sq Epi: x / Non Sq Epi: Few /HPF / Bacteria: Few /HPF        LIVER FUNCTIONS - ( 01 Dec 2022 15:30 )  Alb: 1.9 g/dL / Pro: 6.2 g/dL / ALK PHOS: 83 U/L / ALT: 24 U/L DA / AST: 22 U/L / GGT: x             PT/INR - ( 01 Dec 2022 00:49 )   PT: 11.8 sec;   INR: 0.99 ratio         PTT - ( 01 Dec 2022 00:49 )  PTT:35.0 sec    LACTATE:    ABG - ABG - ( 01 Dec 2022 15:31 )  pH, Arterial: 7.20  pH, Blood: x     /  pCO2: 46    /  pO2: 98    / HCO3: 18    / Base Excess: -9.9  /  SaO2: 98                  CULTURES:       RADIOLOGY:< from: CT Abdomen and Pelvis No Cont (22 @ 15:37) >    ACC: [id number] EXAM:  CT CHEST                        ACC: [id number] EXAM:  CT ABDOMEN AND PELVIS                          PROCEDURE DATE:  2022          INTERPRETATION:  CLINICAL INFORMATION: Infection    COMPARISON: CT of the chest, abdomen, pelvis dated 2022.    CONTRAST/COMPLICATIONS:  IV Contrast: NONE  Oral Contrast: NONE  Complications: None reported at time of study completion    PROCEDURE:  CT of the Chest, Abdomen and Pelvis was performed.  Sagittal and coronal reformats wereperformed.    FINDINGS:  CHEST:  LUNGS AND LARGE AIRWAYS: Patent central airways. ET tube terminates above   the lesley. Combination of partial atelectasis and consolidation in the   lower lobes bilaterally. There are scattered small atelectasis in the   rest of the lung fields the bilaterally. Small blebs in the lung apices   bilaterally.  PLEURA: Small right and trace left pleural effusions. No evidence for   pneumothorax.  VESSELS: Aortic and coronary artery calcifications are present. No   evidence for aortic aneurysm.  HEART: Mild cardiomegaly. No pericardial effusion.  MEDIASTINUM AND CARLOS: NG tube terminates in the distal esophagus;   advancement is recommended.  CHEST WALL AND LOWER NECK: Coarse calcifications in the thyroid   bilaterally. Right IJ central venous catheter terminates in the   cavoatrial junction.    ABDOMEN AND PELVIS: Evaluation of the abdomen and pelvis is limited by   lack of IV contrast.  LIVER: Within normal limits.  BILE DUCTS: Normal caliber.  GALLBLADDER: Stable cholecystectomy clips.  SPLEEN: Mild splenomegaly measuring 13.8.  PANCREAS: Within normal limits.  ADRENALS: Within normal limits.  KIDNEYS/URETERS: No evidence for a calculus in the kidneys or ureters. No   hydronephrosis. Perinephric stranding again noted bilaterally. Although   this is a nonspecific finding, pyelonephritis can have this noncontrast   CT appearance. Clinical correlation with urinalysis is recommended.    BLADDER: Viera catheter in the urinary bladder which is underdistended.  REPRODUCTIVE ORGANS: The prostate and seminal vesicles appear grossly   unremarkable.    BOWEL: No bowel obstruction. Colonic diverticulosis without evidence of   diverticulitis. Appendix within normal limits.  Moderate amount of stool in the rectum with mild rectal wall thickening   may represent associated stercoral proctitis. Follow-up colonoscopy after   treatment/resolution of acute disease may be pursued for additional   evaluation. A rectal tube is present, terminating in the rectum.  PERITONEUM: No free air. Small pelvic ascites.  VESSELS: Calcified atherosclerotic disease. IVC filter is present.  RETROPERITONEUM/LYMPH NODES: Mildly enlarged 1.2 cm left the common iliac   chain lymph node (image 223 series 2). 1.6 cm enlarged leftinguinal   lymph node (image 337 series 2). Enlarged 1.5 cm right inguinal lymph   node (image 318 series 2).  ABDOMINAL WALL: Mild fat-containing inguinal hernias bilaterally. Small   fat-containing umbilical hernia.  BONES: 2 stable old right rib fractures. Mild degenerative spondylosis.   Severe degenerative joint disease at the right hip.    IMPRESSION:  NG tube terminates in the distal esophagus; advancement is recommended.    Combination of partial atelectasis and consolidation in the lowerlobes   bilaterally. Clinical correlation with pneumonia is recommended.    Small right and trace left pleural effusions.    Mild splenomegaly.    Perinephric stranding again noted bilaterally. Although this is a   nonspecific finding, pyelonephritiscan have this noncontrast CT   appearance. Clinical correlation with urinalysis is recommended.    Moderate amount of stool in the rectum with mild rectal wall thickening   may represent associated stercoral proctitis. Follow-up colonoscopy after   treatment/resolution of acute disease may be pursued for additional   evaluation.    Small pelvic ascites.    Dr. Calvillo is informed with read back.    --- End of Report ---            ROBBY BARRAGAN MD; Attending Radiologist  This document has been electronically signed. Dec  1 2022  4:37PM    < end of copied text >        ROS  [  ] UNABLE TO ELICIT               HPI:  62 year old morbid obese male, from [facility], has past medical hx of ambulatory dysfunction, ACD, bipolar disorder, BPH, CKD, HLD, PVD,  COPD not on oxygen, hypothyroidism, RBBB, lymphedema, CHF (last echo  normal EF, GIDD) was BIBEMS for hypotension 73/37 mmHg and bradycardia (40's). Story obtained from ED provider note as patient is not able to provide any history. Patient received 500 cc NS and one dose of atropine for bradycardia. Patient very somnolent during interview but arousable to verbal stimuli.  (2022 18:39)        History as above, patient I had seen as an informal consult yesterday for a pustular rash on both his lower legs where he has chronic lymphedema and stasis dermatitis, he developed hypotension and SOB and had to be intubated , he is currently sedated but responds to pain, he is orally intubated and vent dependent on a FIO2 of 40% and PEEP of 5, he is on a pressor as well. He has some low grade fevers and a rising WBC count and was found to have bilat Pneumonia on a CT chest. His rash on his legs has improved a lot and the pus has mostly dried up. He is currently on Rocephin and Azithromycin for his Pneumonia.      PAST MEDICAL & SURGICAL HISTORY:  Hypothyroid      Hyperlipidemia      Ambulatory dysfunction      Anemia      History of BPH      CKD (chronic kidney disease)      Chronic obstructive pulmonary disease (COPD)      Bipolar disorder      HLD (hyperlipidemia)      BPH (benign prostatic hyperplasia)      Chronic diastolic congestive heart failure      Lymphedema      No significant past surgical history          No Known Allergies      Meds:  albuterol    90 MICROgram(s) HFA Inhaler 2 Puff(s) Inhalation every 6 hours  chlorhexidine 0.12% Liquid 15 milliLiter(s) Oral Mucosa every 12 hours  chlorhexidine 2% Cloths 1 Application(s) Topical <User Schedule>  fentaNYL   Infusion. 0.5 MICROgram(s)/kG/Hr IV Continuous <Continuous>  heparin   Injectable 5000 Unit(s) SubCutaneous every 8 hours  hydrocortisone sodium succinate Injectable 50 milliGRAM(s) IV Push every 6 hours  lactated ringers. 1000 milliLiter(s) IV Continuous <Continuous>  levothyroxine Injectable 100 MICROGram(s) IV Push at bedtime  norepinephrine Infusion 0.05 MICROgram(s)/kG/Min IV Continuous <Continuous>  pantoprazole  Injectable 40 milliGRAM(s) IV Push daily  valproate sodium  IVPB 500 milliGRAM(s) IV Intermittent every 12 hours      SOCIAL HISTORY: unknown    FAMILY HISTORY: unknown      VITALS:  Vital Signs Last 24 Hrs  T(C): 37.5 (01 Dec 2022 16:30), Max: 37.9 (01 Dec 2022 13:15)  T(F): 99.5 (01 Dec 2022 16:30), Max: 100.2 (01 Dec 2022 13:15)  HR: 80 (01 Dec 2022 16:30) (45 - 85)  BP: 103/44 (01 Dec 2022 16:00) (58/26 - 124/55)  BP(mean): 59 (01 Dec 2022 16:00) (33 - 73)  RR: 28 (01 Dec 2022 16:30) (15 - 28)  SpO2: 97% (01 Dec 2022 16:30) (81% - 100%)    Parameters below as of 01 Dec 2022 07:00  Patient On (Oxygen Delivery Method): ventilator    O2 Concentration (%): 50    LABS/DIAGNOSTIC TESTS:                          9.2    14.04 )-----------( 273      ( 01 Dec 2022 06:03 )             27.6     WBC Count: 14.04 K/uL ( @ 06:03)  WBC Count: 14.25 K/uL ( @ 00:14)  WBC Count: 9.52 K/uL ( @ 13:50)          138  |  106  |  124<H>  ----------------------------<  112<H>  4.0   |  20<L>  |  5.82<H>    Ca    8.8      01 Dec 2022 15:30  Phos  5.3       Mg     2.5         TPro  6.2  /  Alb  1.9<L>  /  TBili  0.2  /  DBili  x   /  AST  22  /  ALT  24  /  AlkPhos  83        Urinalysis Basic - ( 01 Dec 2022 00:14 )    Color: Yellow / Appearance: Clear / S.005 / pH: x  Gluc: x / Ketone: Negative  / Bili: Negative / Urobili: Negative   Blood: x / Protein: 30 mg/dL / Nitrite: Negative   Leuk Esterase: Negative / RBC: 2-5 /HPF / WBC 3-5 /HPF   Sq Epi: x / Non Sq Epi: Few /HPF / Bacteria: Few /HPF        LIVER FUNCTIONS - ( 01 Dec 2022 15:30 )  Alb: 1.9 g/dL / Pro: 6.2 g/dL / ALK PHOS: 83 U/L / ALT: 24 U/L DA / AST: 22 U/L / GGT: x             PT/INR - ( 01 Dec 2022 00:49 )   PT: 11.8 sec;   INR: 0.99 ratio         PTT - ( 01 Dec 2022 00:49 )  PTT:35.0 sec    LACTATE:    ABG - ABG - ( 01 Dec 2022 15:31 )  pH, Arterial: 7.20  pH, Blood: x     /  pCO2: 46    /  pO2: 98    / HCO3: 18    / Base Excess: -9.9  /  SaO2: 98                  CULTURES:       RADIOLOGY:< from: CT Abdomen and Pelvis No Cont (22 @ 15:37) >    ACC: [id number] EXAM:  CT CHEST                        ACC: [id number] EXAM:  CT ABDOMEN AND PELVIS                          PROCEDURE DATE:  2022          INTERPRETATION:  CLINICAL INFORMATION: Infection    COMPARISON: CT of the chest, abdomen, pelvis dated 2022.    CONTRAST/COMPLICATIONS:  IV Contrast: NONE  Oral Contrast: NONE  Complications: None reported at time of study completion    PROCEDURE:  CT of the Chest, Abdomen and Pelvis was performed.  Sagittal and coronal reformats wereperformed.    FINDINGS:  CHEST:  LUNGS AND LARGE AIRWAYS: Patent central airways. ET tube terminates above   the lesley. Combination of partial atelectasis and consolidation in the   lower lobes bilaterally. There are scattered small atelectasis in the   rest of the lung fields the bilaterally. Small blebs in the lung apices   bilaterally.  PLEURA: Small right and trace left pleural effusions. No evidence for   pneumothorax.  VESSELS: Aortic and coronary artery calcifications are present. No   evidence for aortic aneurysm.  HEART: Mild cardiomegaly. No pericardial effusion.  MEDIASTINUM AND CARLOS: NG tube terminates in the distal esophagus;   advancement is recommended.  CHEST WALL AND LOWER NECK: Coarse calcifications in the thyroid   bilaterally. Right IJ central venous catheter terminates in the   cavoatrial junction.    ABDOMEN AND PELVIS: Evaluation of the abdomen and pelvis is limited by   lack of IV contrast.  LIVER: Within normal limits.  BILE DUCTS: Normal caliber.  GALLBLADDER: Stable cholecystectomy clips.  SPLEEN: Mild splenomegaly measuring 13.8.  PANCREAS: Within normal limits.  ADRENALS: Within normal limits.  KIDNEYS/URETERS: No evidence for a calculus in the kidneys or ureters. No   hydronephrosis. Perinephric stranding again noted bilaterally. Although   this is a nonspecific finding, pyelonephritis can have this noncontrast   CT appearance. Clinical correlation with urinalysis is recommended.    BLADDER: Viera catheter in the urinary bladder which is underdistended.  REPRODUCTIVE ORGANS: The prostate and seminal vesicles appear grossly   unremarkable.    BOWEL: No bowel obstruction. Colonic diverticulosis without evidence of   diverticulitis. Appendix within normal limits.  Moderate amount of stool in the rectum with mild rectal wall thickening   may represent associated stercoral proctitis. Follow-up colonoscopy after   treatment/resolution of acute disease may be pursued for additional   evaluation. A rectal tube is present, terminating in the rectum.  PERITONEUM: No free air. Small pelvic ascites.  VESSELS: Calcified atherosclerotic disease. IVC filter is present.  RETROPERITONEUM/LYMPH NODES: Mildly enlarged 1.2 cm left the common iliac   chain lymph node (image 223 series 2). 1.6 cm enlarged leftinguinal   lymph node (image 337 series 2). Enlarged 1.5 cm right inguinal lymph   node (image 318 series 2).  ABDOMINAL WALL: Mild fat-containing inguinal hernias bilaterally. Small   fat-containing umbilical hernia.  BONES: 2 stable old right rib fractures. Mild degenerative spondylosis.   Severe degenerative joint disease at the right hip.    IMPRESSION:  NG tube terminates in the distal esophagus; advancement is recommended.    Combination of partial atelectasis and consolidation in the lowerlobes   bilaterally. Clinical correlation with pneumonia is recommended.    Small right and trace left pleural effusions.    Mild splenomegaly.    Perinephric stranding again noted bilaterally. Although this is a   nonspecific finding, pyelonephritiscan have this noncontrast CT   appearance. Clinical correlation with urinalysis is recommended.    Moderate amount of stool in the rectum with mild rectal wall thickening   may represent associated stercoral proctitis. Follow-up colonoscopy after   treatment/resolution of acute disease may be pursued for additional   evaluation.    Small pelvic ascites.    Dr. Calvillo is informed with read back.    --- End of Report ---            ROBBY BARRAGAN MD; Attending Radiologist  This document has been electronically signed. Dec  1 2022  4:37PM    < end of copied text >        ROS  [ x ] UNABLE TO ELICIT

## 2022-12-01 NOTE — PROGRESS NOTE ADULT - ATTENDING COMMENTS
62 year old morbid obese male, from [facility], has past medical hx of ambulatory dysfunction, ACD, bipolar disorder, BPH, CKD, HLD, PVD, COPD not on oxygen, hypothyroidism, RBBB, lymphedema, CHF (last echo 2/22 normal EF, GIDD). Presented for hypotension 73/37 mmHg and bradycardia (40's). Patient admitted to ICU for shock and acute encephalopathy. Now intubated on mechanical ventilation.     DX:  - Shock - septic vs hypovolemia  - Bilateral lower cellulitis   - Acute encephalopathy   - Sinus bradycardia  - HAGMA   - SHEYLA on CKD  - Anemia   - COPD not on oxygen at home   - HLD   - BPH  - Hypothyroidism   - Mood disorder     Plan:  - Monitor hemodyanmics  - Vasopressor support to maintain MAP > 65  - Mechanical ventilation with lung protective strategy  - Adjust vent per ABG  - Change antibiotics to cefepime   - Obtain CT Chest abdomen and pelvis   - ID consult  - Follow up cultures  - Monitor renal function  - Viera for urinary obstruction   - Change to LR for now  - repeat labs in PM   - Place NGT and start tube feedings  - Cont. home valproic acid  - Cont. Levothyroxine  - DVT and stress ulcer prophylaxis  - Cont. ICU care for now

## 2022-12-01 NOTE — DIETITIAN INITIAL EVALUATION ADULT - ENTERAL
Nepro 40x24 (960 ml, 1728 kcals, 78 gm protein). Add Prosource (each Pkt provides: 15 gm protein, 60 kcals). May consider 4 Pkts/ day (may start 1 Pkt BID), then increase to either 2 Pkt BID or 1 Pkt 4x/day. MD to monitor. RD available.

## 2022-12-01 NOTE — PROGRESS NOTE ADULT - SUBJECTIVE AND OBJECTIVE BOX
INTERVAL HPI/OVERNIGHT EVENTS:     PRESSORS: [ ] YES [ ] NO  WHICH:    ANTIBIOTICS:                  DATE STARTED:  ANTIBIOTICS:                  DATE STARTED:  ANTIBIOTICS:                  DATE STARTED:    Antimicrobial:  ceFAZolin   IVPB      ceFAZolin   IVPB 1000 milliGRAM(s) IV Intermittent every 12 hours    Cardiovascular:  DOPamine Infusion 5 MICROgram(s)/kG/Min IV Continuous <Continuous>  norepinephrine Infusion 0.08 MICROgram(s)/kG/Min IV Continuous <Continuous>    Pulmonary:    Hematalogic:  heparin   Injectable 5000 Unit(s) SubCutaneous every 8 hours    Other:  chlorhexidine 0.12% Liquid 15 milliLiter(s) Oral Mucosa every 12 hours  chlorhexidine 2% Cloths 1 Application(s) Topical <User Schedule>  fentaNYL   Infusion. 0.5 MICROgram(s)/kG/Hr IV Continuous <Continuous>  hydrocortisone sodium succinate Injectable 50 milliGRAM(s) IV Push every 6 hours  pantoprazole  Injectable 40 milliGRAM(s) IV Push daily  potassium chloride  10 mEq/100 mL IVPB 10 milliEquivalent(s) IV Intermittent every 1 hour  sodium bicarbonate  Infusion 0.132 mEq/kG/Hr IV Continuous <Continuous>      Drug Dosing Weight  Height (cm): 177.8 (2022 18:45)  Weight (kg): 113.9 (2022 18:45)  BMI (kg/m2): 36 (2022 18:45)  BSA (m2): 2.3 (2022 18:45)    CENTRAL LINE: [ ] YES [ ] NO  LOCATION:   DATE INSERTED:  REMOVE: [ ] YES [ ] NO  EXPLAIN:    HATFIELD: [ ] YES [ ] NO    DATE INSERTED:  REMOVE:  [ ] YES [ ] NO  EXPLAIN:    A-LINE:  [ ] YES [ ] NO  LOCATION:   DATE INSERTED:  REMOVE:  [ ] YES [ ] NO  EXPLAIN:    PMH/Social Hx/Fam Hx -reviewed admission note, no change since admission  PAST MEDICAL & SURGICAL HISTORY:  Hypothyroid      Hyperlipidemia      Ambulatory dysfunction      Anemia      History of BPH      CKD (chronic kidney disease)      Chronic obstructive pulmonary disease (COPD)      Bipolar disorder      HLD (hyperlipidemia)      BPH (benign prostatic hyperplasia)      Chronic diastolic congestive heart failure      Lymphedema      No significant past surgical history        Heart faliure: acute [ ] chronic [ ] acute or chronic [ ] diastolic [ ] systolic [ ] combied systolic and diastolic[ ]  SHEYLA: ATN[ ] renal medullary necrosis [ ] CKD I [ ]CKDII [ ]CKD III [ ]CKD IV [ ]CKD V [ ]Other pathological lesions [ ]  Abdominal Nutrition Status: malnutrition [ ] cachexia [ ] morbid obesity/BMI=40 [ ] Supplement ordered [___________]     T(C): 34.5 (22 @ 07:45), Max: 37 (22 @ 13:36)  HR: 68 (22 @ 07:45)  BP: 99/44 (22 @ 07:45)  BP(mean): 58 (22 @ 07:45)  ABP: --  ABP(mean): --  RR: 20 (22 @ 07:45)  SpO2: 95% (22 @ 07:45)  Wt(kg): --    ABG - ( 01 Dec 2022 06:04 )  pH, Arterial: 7.31  pH, Blood: x     /  pCO2: 37    /  pO2: 79    / HCO3: 19    / Base Excess: -7.0  /  SaO2: 97                     @ 07:01  -   @ 07:00  --------------------------------------------------------  IN: 2083.3 mL / OUT: 4350 mL / NET: -2266.7 mL        Mode: AC/ CMV (Assist Control/ Continuous Mandatory Ventilation)  RR (machine): 20  TV (machine): 540  FiO2: 50  PEEP: 5  ITime: 0.9  MAP: 11  PIP: 29      PHYSICAL EXAM:    GENERAL: [ ]NAD, [ ]well-groomed, [ ]well-developed  HEAD:  [ ]Atraumatic, [ ]Normocephalic  EYES: [ ]EOMI, [ ]PERRLA, [ ]conjunctiva and sclera clear  ENMT: [ ]No tonsillar erythema, exudates, or enlargement; [ ]Moist mucous membranes, [ ]Good dentition, [ ]No lesions  NECK: [ ]Supple, normal appearance, [ ]No JVD; [ ]Normal thyroid; [ ]Trachea midline  NERVOUS SYSTEM:  [ ]Alert & Oriented X3, [ ]Good concentration; [ ]Motor Strength 5/5 B/L upper and lower extremities; [ ]DTRs 2+ intact and symmetric  CHEST/LUNG: [ ]No chest deformity; [ ]Normal percussion bilaterally; [ ]No rales, rhonchi, wheezing; [ ]Crackles at bases  HEART: [ ]Regular rate and rhythm; [ ]No murmurs, rubs, or gallops  ABDOMEN: [ ]Soft, Nontender, Nondistended; [ ]Bowel sounds present  EXTREMITIES:  [ ]2+ Peripheral Pulses, [ ]No clubbing, cyanosis, or edema [ ]Bilat lower extremity edema  LYMPH: [ ]No lymphadenopathy noted  SKIN: [ ]No rashes or lesions; [ ]Good capillary refill      LABS:  CBC Full  -  ( 01 Dec 2022 06:03 )  WBC Count : 14.04 K/uL  RBC Count : 3.07 M/uL  Hemoglobin : 9.2 g/dL  Hematocrit : 27.6 %  Platelet Count - Automated : 273 K/uL  Mean Cell Volume : 89.9 fl  Mean Cell Hemoglobin : 30.0 pg  Mean Cell Hemoglobin Concentration : 33.3 gm/dL  Auto Neutrophil # : x  Auto Lymphocyte # : x  Auto Monocyte # : x  Auto Eosinophil # : x  Auto Basophil # : x  Auto Neutrophil % : x  Auto Lymphocyte % : x  Auto Monocyte % : x  Auto Eosinophil % : x  Auto Basophil % : x        141  |  106  |  121<H>  ----------------------------<  139<H>  3.3<L>   |  20<L>  |  5.68<H>    Ca    9.2      01 Dec 2022 06:03  Phos  5.3       Mg     2.5         TPro  6.5  /  Alb  1.9<L>  /  TBili  0.2  /  DBili  x   /  AST  27  /  ALT  26  /  AlkPhos  93  12    PT/INR - ( 01 Dec 2022 00:49 )   PT: 11.8 sec;   INR: 0.99 ratio         PTT - ( 01 Dec 2022 00:49 )  PTT:35.0 sec  Urinalysis Basic - ( 01 Dec 2022 00:14 )    Color: Yellow / Appearance: Clear / S.005 / pH: x  Gluc: x / Ketone: Negative  / Bili: Negative / Urobili: Negative   Blood: x / Protein: 30 mg/dL / Nitrite: Negative   Leuk Esterase: Negative / RBC: 2-5 /HPF / WBC 3-5 /HPF   Sq Epi: x / Non Sq Epi: Few /HPF / Bacteria: Few /HPF          RADIOLOGY & ADDITIONAL STUDIES REVIEWED:      [ ]GOALS OF CARE DISCUSSION WITH PATIENT/FAMILY/PROXY:    CRITICAL CARE TIME SPENT: 35 minutes INTERVAL HPI/OVERNIGHT EVENTS: Patient became further lethargic, unable to follow commands, intubated, sedated with fentanyl    PRESSORS: [X] YES [ ] NO  WHICH: Levophed, Dopamine    ANTIBIOTICS: Cefazolin                 DATE STARTED: -  ANTIBIOTICS: Cefepime                 DATE STARTED:     Antimicrobial:  ceFAZolin   IVPB      ceFAZolin   IVPB 1000 milliGRAM(s) IV Intermittent every 12 hours    Cardiovascular:  DOPamine Infusion 5 MICROgram(s)/kG/Min IV Continuous <Continuous>  norepinephrine Infusion 0.08 MICROgram(s)/kG/Min IV Continuous <Continuous>    Pulmonary:    Hematalogic:  heparin   Injectable 5000 Unit(s) SubCutaneous every 8 hours    Other:  chlorhexidine 0.12% Liquid 15 milliLiter(s) Oral Mucosa every 12 hours  chlorhexidine 2% Cloths 1 Application(s) Topical <User Schedule>  fentaNYL   Infusion. 0.5 MICROgram(s)/kG/Hr IV Continuous <Continuous>  hydrocortisone sodium succinate Injectable 50 milliGRAM(s) IV Push every 6 hours  pantoprazole  Injectable 40 milliGRAM(s) IV Push daily  potassium chloride  10 mEq/100 mL IVPB 10 milliEquivalent(s) IV Intermittent every 1 hour  sodium bicarbonate  Infusion 0.132 mEq/kG/Hr IV Continuous <Continuous>      Drug Dosing Weight  Height (cm): 177.8 (2022 18:45)  Weight (kg): 113.9 (2022 18:45)  BMI (kg/m2): 36 (2022 18:45)  BSA (m2): 2.3 (2022 18:45)    CENTRAL LINE: [X ] YES [ ] NO  LOCATION: [facility]  DATE INSERTED:   REMOVE: [ ] YES [ ] NO  EXPLAIN:    HATFIELD: [X ] YES [ ] NO    DATE INSERTED:   REMOVE:  [ ] YES [ ] NO  EXPLAIN:    A-LINE:  [X] YES [ ] NO  LOCATION:   DATE INSERTED:   REMOVE:  [ ] YES [ ] NO  EXPLAIN:    PMH/Social Hx/Fam Hx -reviewed admission note, no change since admission  PAST MEDICAL & SURGICAL HISTORY:  Hypothyroid      Hyperlipidemia      Ambulatory dysfunction      Anemia      History of BPH      CKD (chronic kidney disease)      Chronic obstructive pulmonary disease (COPD)      Bipolar disorder      HLD (hyperlipidemia)      BPH (benign prostatic hyperplasia)      Chronic diastolic congestive heart failure      Lymphedema      No significant past surgical history        Heart faliure: acute [ ] chronic [ ] acute or chronic [ ] diastolic [ ] systolic [ ] combied systolic and diastolic[ ]  SHEYLA: ATN[ ] renal medullary necrosis [ ] CKD I [ ]CKDII [ ]CKD III [ ]CKD IV [ ]CKD V [ ]Other pathological lesions [ ]  Abdominal Nutrition Status: malnutrition [ ] cachexia [ ] morbid obesity/BMI=40 [ ] Supplement ordered [___________]     T(C): 34.5 (22 @ 07:45), Max: 37 (22 @ 13:36)  HR: 68 (22 @ 07:45)  BP: 99/44 (22 @ 07:45)  BP(mean): 58 (22 @ 07:45)  ABP: --  ABP(mean): --  RR: 20 (22 @ 07:45)  SpO2: 95% (22 @ 07:45)  Wt(kg): --    ABG - ( 01 Dec 2022 06:04 )  pH, Arterial: 7.31  pH, Blood: x     /  pCO2: 37    /  pO2: 79    / HCO3: 19    / Base Excess: -7.0  /  SaO2: 97                     @ 07:01  -   @ 07:00  --------------------------------------------------------  IN: 2083.3 mL / OUT: 4350 mL / NET: -2266.7 mL        Mode: AC/ CMV (Assist Control/ Continuous Mandatory Ventilation)  RR (machine): 20  TV (machine): 540  FiO2: 50  PEEP: 5  ITime: 0.9  MAP: 11  PIP: 29      PHYSICAL EXAM:    GENERAL: Obese, obtunded  HEAD:  Atraumatic, Normocephalic  EYES: EOMI, PERRLA, conjunctiva and sclera clear  NECK: Supple, normal appearance,  NERVOUS SYSTEM:  Lethargic, does not follow commands  CHEST/LUNG: Lungs clear to auscultation bilaterally, No rales, rhonchi, wheezing   HEART: Regular rate and rhythm; No murmurs, rubs, or gallops  ABDOMEN: Soft, Nontender, Distended; Bowel sounds present  EXTREMITIES:  2+ Peripheral Pulses, No clubbing, cyanosis, or edema  LYMPH: No lymphadenopathy noted  SKIN: Sacral ulcer wound, Posterior Right Thigh        LABS:  CBC Full  -  ( 01 Dec 2022 06:03 )  WBC Count : 14.04 K/uL  RBC Count : 3.07 M/uL  Hemoglobin : 9.2 g/dL  Hematocrit : 27.6 %  Platelet Count - Automated : 273 K/uL  Mean Cell Volume : 89.9 fl  Mean Cell Hemoglobin : 30.0 pg  Mean Cell Hemoglobin Concentration : 33.3 gm/dL  Auto Neutrophil # : x  Auto Lymphocyte # : x  Auto Monocyte # : x  Auto Eosinophil # : x  Auto Basophil # : x  Auto Neutrophil % : x  Auto Lymphocyte % : x  Auto Monocyte % : x  Auto Eosinophil % : x  Auto Basophil % : x        141  |  106  |  121<H>  ----------------------------<  139<H>  3.3<L>   |  20<L>  |  5.68<H>    Ca    9.2      01 Dec 2022 06:03  Phos  5.3     12  Mg     2.5         TPro  6.5  /  Alb  1.9<L>  /  TBili  0.2  /  DBili  x   /  AST  27  /  ALT  26  /  AlkPhos  93  12    PT/INR - ( 01 Dec 2022 00:49 )   PT: 11.8 sec;   INR: 0.99 ratio         PTT - ( 01 Dec 2022 00:49 )  PTT:35.0 sec  Urinalysis Basic - ( 01 Dec 2022 00:14 )    Color: Yellow / Appearance: Clear / S.005 / pH: x  Gluc: x / Ketone: Negative  / Bili: Negative / Urobili: Negative   Blood: x / Protein: 30 mg/dL / Nitrite: Negative   Leuk Esterase: Negative / RBC: 2-5 /HPF / WBC 3-5 /HPF   Sq Epi: x / Non Sq Epi: Few /HPF / Bacteria: Few /HPF          RADIOLOGY & ADDITIONAL STUDIES REVIEWED:      [ ]GOALS OF CARE DISCUSSION WITH PATIENT/FAMILY/PROXY:    CRITICAL CARE TIME SPENT: 35 minutes

## 2022-12-02 NOTE — PROGRESS NOTE ADULT - SUBJECTIVE AND OBJECTIVE BOX
INTERVAL HPI/OVERNIGHT EVENTS:     PRESSORS: [ ] YES [ ] NO  WHICH:    ANTIBIOTICS:                  DATE STARTED:  ANTIBIOTICS:                  DATE STARTED:  ANTIBIOTICS:                  DATE STARTED:    Antimicrobial:  azithromycin  IVPB 500 milliGRAM(s) IV Intermittent every 24 hours  cefTRIAXone   IVPB 1000 milliGRAM(s) IV Intermittent every 24 hours    Cardiovascular:  norepinephrine Infusion 0.05 MICROgram(s)/kG/Min IV Continuous <Continuous>    Pulmonary:  albuterol    90 MICROgram(s) HFA Inhaler 2 Puff(s) Inhalation every 6 hours    Hematalogic:  heparin   Injectable 5000 Unit(s) SubCutaneous every 8 hours    Other:  buPROPion . 75 milliGRAM(s) Oral two times a day  chlorhexidine 0.12% Liquid 15 milliLiter(s) Oral Mucosa every 12 hours  chlorhexidine 2% Cloths 1 Application(s) Topical <User Schedule>  collagenase Ointment 1 Application(s) Topical two times a day  dexMEDEtomidine Infusion 0.3 MICROgram(s)/kG/Hr IV Continuous <Continuous>  fentaNYL   Infusion. 0.5 MICROgram(s)/kG/Hr IV Continuous <Continuous>  finasteride 5 milliGRAM(s) Oral daily  hydrocortisone sodium succinate Injectable 50 milliGRAM(s) IV Push every 6 hours  lactated ringers. 1000 milliLiter(s) IV Continuous <Continuous>  levothyroxine Injectable 94 MICROGram(s) IV Push at bedtime  pantoprazole  Injectable 40 milliGRAM(s) IV Push daily  polyethylene glycol 3350 17 Gram(s) Oral at bedtime  risperiDONE   Tablet 2 milliGRAM(s) Oral daily  senna Syrup 10 milliLiter(s) Oral at bedtime  sodium bicarbonate 325 milliGRAM(s) Oral every 6 hours  tamsulosin 0.4 milliGRAM(s) Oral at bedtime  valproic  acid Syrup 250 milliGRAM(s) Oral every 6 hours  zinc oxide 20% Ointment 1 Application(s) Topical two times a day      Drug Dosing Weight  Height (cm): 177.8 (2022 18:45)  Weight (kg): 113.9 (2022 18:45)  BMI (kg/m2): 36 (2022 18:45)  BSA (m2): 2.3 (2022 18:45)    CENTRAL LINE: [ ] YES [ ] NO  LOCATION:   DATE INSERTED:  REMOVE: [ ] YES [ ] NO  EXPLAIN:    HATFIELD: [ ] YES [ ] NO    DATE INSERTED:  REMOVE:  [ ] YES [ ] NO  EXPLAIN:    A-LINE:  [ ] YES [ ] NO  LOCATION:   DATE INSERTED:  REMOVE:  [ ] YES [ ] NO  EXPLAIN:    PMH/Social Hx/Fam Hx -reviewed admission note, no change since admission  PAST MEDICAL & SURGICAL HISTORY:  Hypothyroid      Hyperlipidemia      Ambulatory dysfunction      Anemia      History of BPH      CKD (chronic kidney disease)      Chronic obstructive pulmonary disease (COPD)      Bipolar disorder      HLD (hyperlipidemia)      BPH (benign prostatic hyperplasia)      Chronic diastolic congestive heart failure      Lymphedema      No significant past surgical history        Heart faliure: acute [ ] chronic [ ] acute or chronic [ ] diastolic [ ] systolic [ ] combied systolic and diastolic[ ]  SHEYLA: ATN[ ] renal medullary necrosis [ ] CKD I [ ]CKDII [ ]CKD III [ ]CKD IV [ ]CKD V [ ]Other pathological lesions [ ]  Abdominal Nutrition Status: malnutrition [ ] cachexia [ ] morbid obesity/BMI=40 [ ] Supplement ordered [___________]     T(C): 36.9 (22 @ 10:45), Max: 37.9 (22 @ 13:15)  HR: 63 (22 @ 10:45)  BP: 111/43 (22 @ 10:00)  BP(mean): 60 (22 @ 10:00)  ABP: 115/40 (22 @ 10:45)  ABP(mean): 62 (22 @ 10:45)  RR: 30 (22 @ 10:45)  SpO2: 100% (22 @ 10:45)  Wt(kg): --    ABG - ( 02 Dec 2022 03:41 )  pH, Arterial: 7.30  pH, Blood: x     /  pCO2: 34    /  pO2: 107   / HCO3: 17    / Base Excess: -8.8  /  SaO2: 98                     @ 07:01  -   @ 07:00  --------------------------------------------------------  IN: 3042.5 mL / OUT: 2000 mL / NET: 1042.5 mL        Mode: AC/ CMV (Assist Control/ Continuous Mandatory Ventilation)  RR (machine): 30  TV (machine): 450  FiO2: 60  PEEP: 5  ITime: 0.9  MAP: 11  PIP: 21      PHYSICAL EXAM:    GENERAL: [ ]NAD, [ ]well-groomed, [ ]well-developed  HEAD:  [ ]Atraumatic, [ ]Normocephalic  EYES: [ ]EOMI, [ ]PERRLA, [ ]conjunctiva and sclera clear  ENMT: [ ]No tonsillar erythema, exudates, or enlargement; [ ]Moist mucous membranes, [ ]Good dentition, [ ]No lesions  NECK: [ ]Supple, normal appearance, [ ]No JVD; [ ]Normal thyroid; [ ]Trachea midline  NERVOUS SYSTEM:  [ ]Alert & Oriented X3, [ ]Good concentration; [ ]Motor Strength 5/5 B/L upper and lower extremities; [ ]DTRs 2+ intact and symmetric  CHEST/LUNG: [ ]No chest deformity; [ ]Normal percussion bilaterally; [ ]No rales, rhonchi, wheezing; [ ]Crackles at bases  HEART: [ ]Regular rate and rhythm; [ ]No murmurs, rubs, or gallops  ABDOMEN: [ ]Soft, Nontender, Nondistended; [ ]Bowel sounds present  EXTREMITIES:  [ ]2+ Peripheral Pulses, [ ]No clubbing, cyanosis, or edema [ ]Bilat lower extremity edema  LYMPH: [ ]No lymphadenopathy noted  SKIN: [ ]No rashes or lesions; [ ]Good capillary refill      LABS:  CBC Full  -  ( 02 Dec 2022 06:45 )  WBC Count : 12.76 K/uL  RBC Count : 2.62 M/uL  Hemoglobin : 7.9 g/dL  Hematocrit : 24.4 %  Platelet Count - Automated : 250 K/uL  Mean Cell Volume : 93.1 fl  Mean Cell Hemoglobin : 30.2 pg  Mean Cell Hemoglobin Concentration : 32.4 gm/dL  Auto Neutrophil # : x  Auto Lymphocyte # : x  Auto Monocyte # : x  Auto Eosinophil # : x  Auto Basophil # : x  Auto Neutrophil % : x  Auto Lymphocyte % : x  Auto Monocyte % : x  Auto Eosinophil % : x  Auto Basophil % : x    12    142  |  112<H>  |  119<H>  ----------------------------<  131<H>  3.8   |  18<L>  |  5.61<H>    Ca    8.9      02 Dec 2022 05:11  Phos  5.6       Mg     2.5         TPro  6.1  /  Alb  1.8<L>  /  TBili  0.2  /  DBili  x   /  AST  16  /  ALT  19  /  AlkPhos  77  12    PT/INR - ( 01 Dec 2022 00:49 )   PT: 11.8 sec;   INR: 0.99 ratio         PTT - ( 01 Dec 2022 00:49 )  PTT:35.0 sec  Urinalysis Basic - ( 01 Dec 2022 00:14 )    Color: Yellow / Appearance: Clear / S.005 / pH: x  Gluc: x / Ketone: Negative  / Bili: Negative / Urobili: Negative   Blood: x / Protein: 30 mg/dL / Nitrite: Negative   Leuk Esterase: Negative / RBC: 2-5 /HPF / WBC 3-5 /HPF   Sq Epi: x / Non Sq Epi: Few /HPF / Bacteria: Few /HPF      Culture Results:   No growth to date. ( @ 00:15)  Culture Results:   No growth to date. ( @ 00:15)  Culture Results:   No growth ( @ 00:14)      RADIOLOGY & ADDITIONAL STUDIES REVIEWED:      [ ]GOALS OF CARE DISCUSSION WITH PATIENT/FAMILY/PROXY:    CRITICAL CARE TIME SPENT: 35 minutes INTERVAL HPI/OVERNIGHT EVENTS: Placed on increased O2, agitated on vent    Antimicrobial:  azithromycin  IVPB 500 milliGRAM(s) IV Intermittent every 24 hours  cefTRIAXone   IVPB 1000 milliGRAM(s) IV Intermittent every 24 hours    Cardiovascular:  norepinephrine Infusion 0.05 MICROgram(s)/kG/Min IV Continuous <Continuous>    Pulmonary:  albuterol    90 MICROgram(s) HFA Inhaler 2 Puff(s) Inhalation every 6 hours    Hematalogic:  heparin   Injectable 5000 Unit(s) SubCutaneous every 8 hours    Other:  buPROPion . 75 milliGRAM(s) Oral two times a day  chlorhexidine 0.12% Liquid 15 milliLiter(s) Oral Mucosa every 12 hours  chlorhexidine 2% Cloths 1 Application(s) Topical <User Schedule>  collagenase Ointment 1 Application(s) Topical two times a day  dexMEDEtomidine Infusion 0.3 MICROgram(s)/kG/Hr IV Continuous <Continuous>  fentaNYL   Infusion. 0.5 MICROgram(s)/kG/Hr IV Continuous <Continuous>  finasteride 5 milliGRAM(s) Oral daily  hydrocortisone sodium succinate Injectable 50 milliGRAM(s) IV Push every 6 hours  lactated ringers. 1000 milliLiter(s) IV Continuous <Continuous>  levothyroxine Injectable 94 MICROGram(s) IV Push at bedtime  pantoprazole  Injectable 40 milliGRAM(s) IV Push daily  polyethylene glycol 3350 17 Gram(s) Oral at bedtime  risperiDONE   Tablet 2 milliGRAM(s) Oral daily  senna Syrup 10 milliLiter(s) Oral at bedtime  sodium bicarbonate 325 milliGRAM(s) Oral every 6 hours  tamsulosin 0.4 milliGRAM(s) Oral at bedtime  valproic  acid Syrup 250 milliGRAM(s) Oral every 6 hours  zinc oxide 20% Ointment 1 Application(s) Topical two times a day      Drug Dosing Weight  Height (cm): 177.8 (2022 18:45)  Weight (kg): 113.9 (2022 18:45)  BMI (kg/m2): 36 (2022 18:45)  BSA (m2): 2.3 (2022 18:45)    CENTRAL LINE: [X] YES [ ] NO  LOCATION:   DATE INSERTED:       HATFIELD: [X] YES [ ] NO    DATE INSERTED:   REMOVE:  [ ] YES [ ] NO  EXPLAIN:    PMH/Social Hx/Fam Hx -reviewed admission note, no change since admission  PAST MEDICAL & SURGICAL HISTORY:  Hypothyroid      Hyperlipidemia      Ambulatory dysfunction      Anemia      History of BPH      CKD (chronic kidney disease)      Chronic obstructive pulmonary disease (COPD)      Bipolar disorder      HLD (hyperlipidemia)      BPH (benign prostatic hyperplasia)      Chronic diastolic congestive heart failure      Lymphedema      No significant past surgical history        Heart faliure: acute [ ] chronic [ ] acute or chronic [ ] diastolic [ ] systolic [ ] combied systolic and diastolic[ ]  SHEYLA: ATN[ ] renal medullary necrosis [ ] CKD I [ ]CKDII [ ]CKD III [ ]CKD IV [ ]CKD V [ ]Other pathological lesions [ ]  Abdominal Nutrition Status: malnutrition [ ] cachexia [ ] morbid obesity/BMI=40 [ ] Supplement ordered [___________]     T(C): 36.9 (22 @ 10:45), Max: 37.9 (22 @ 13:15)  HR: 63 (22 @ 10:45)  BP: 111/43 (22 @ 10:00)  BP(mean): 60 (22 @ 10:00)  ABP: 115/40 (22 @ 10:45)  ABP(mean): 62 (22 @ 10:45)  RR: 30 (22 @ 10:45)  SpO2: 100% (22 @ 10:45)  Wt(kg): --    ABG - ( 02 Dec 2022 03:41 )  pH, Arterial: 7.30  pH, Blood: x     /  pCO2: 34    /  pO2: 107   / HCO3: 17    / Base Excess: -8.8  /  SaO2: 98                     @ 07:01  -   @ 07:00  --------------------------------------------------------  IN: 3042.5 mL / OUT: 2000 mL / NET: 1042.5 mL        Mode: AC/ CMV (Assist Control/ Continuous Mandatory Ventilation)  RR (machine): 30  TV (machine): 450  FiO2: 60  PEEP: 5  ITime: 0.9  MAP: 11  PIP: 21      PHYSICAL EXAM:    GENERAL: Ventilated, Sedated  HEAD:  Atraumatic, Normocephalic  EYES: 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, no scleral icterus  NECK: Supple, normal appearance  NERVOUS SYSTEM: Sedated, responds to noxious stimuli  CHEST/LUNG: Lungs clear to auscultation bilaterally, No rales, rhonchi, wheezing   HEART: Regular rate and rhythm; No murmurs, rubs, or gallops  ABDOMEN: Soft, Nontender, Nondistended; Bowel sounds present  EXTREMITIES,:  severe scaling, discoloring  LYMPH: No lymphadenopathy noted  SKIN: Sacral ulcer, Right posterior thigh        LABS:  CBC Full  -  ( 02 Dec 2022 06:45 )  WBC Count : 12.76 K/uL  RBC Count : 2.62 M/uL  Hemoglobin : 7.9 g/dL  Hematocrit : 24.4 %  Platelet Count - Automated : 250 K/uL  Mean Cell Volume : 93.1 fl  Mean Cell Hemoglobin : 30.2 pg  Mean Cell Hemoglobin Concentration : 32.4 gm/dL  Auto Neutrophil # : x  Auto Lymphocyte # : x  Auto Monocyte # : x  Auto Eosinophil # : x  Auto Basophil # : x  Auto Neutrophil % : x  Auto Lymphocyte % : x  Auto Monocyte % : x  Auto Eosinophil % : x  Auto Basophil % : x    12    142  |  112<H>  |  119<H>  ----------------------------<  131<H>  3.8   |  18<L>  |  5.61<H>    Ca    8.9      02 Dec 2022 05:11  Phos  5.6     12-  Mg     2.5         TPro  6.1  /  Alb  1.8<L>  /  TBili  0.2  /  DBili  x   /  AST  16  /  ALT  19  /  AlkPhos  77  12-02    PT/INR - ( 01 Dec 2022 00:49 )   PT: 11.8 sec;   INR: 0.99 ratio         PTT - ( 01 Dec 2022 00:49 )  PTT:35.0 sec  Urinalysis Basic - ( 01 Dec 2022 00:14 )    Color: Yellow / Appearance: Clear / S.005 / pH: x  Gluc: x / Ketone: Negative  / Bili: Negative / Urobili: Negative   Blood: x / Protein: 30 mg/dL / Nitrite: Negative   Leuk Esterase: Negative / RBC: 2-5 /HPF / WBC 3-5 /HPF   Sq Epi: x / Non Sq Epi: Few /HPF / Bacteria: Few /HPF      Culture Results:   No growth to date. ( @ 00:15)  Culture Results:   No growth to date. ( @ 00:15)  Culture Results:   No growth ( @ 00:14)      RADIOLOGY & ADDITIONAL STUDIES REVIEWED:      [ ]GOALS OF CARE DISCUSSION WITH PATIENT/FAMILY/PROXY:    CRITICAL CARE TIME SPENT: 35 minutes

## 2022-12-02 NOTE — PROGRESS NOTE ADULT - ATTENDING COMMENTS
62 year old morbid obese male, from [facility], has past medical hx of ambulatory dysfunction, ACD, bipolar disorder, BPH, CKD, HLD, PVD, COPD not on oxygen, hypothyroidism, RBBB, lymphedema, CHF (last echo 2/22 normal EF, GIDD). Presented for hypotension 73/37 mmHg and bradycardia (40's). Patient admitted to ICU for shock and acute encephalopathy. Now intubated on mechanical ventilation.     DX:  - Shock - septic vs hypovolemia  - Bilateral lower cellulitis   - Acute encephalopathy   - Sinus bradycardia  - HAGMA   - SHEYLA on CKD  - Anemia   - COPD not on oxygen at home   - HLD   - BPH  - Hypothyroidism   - Mood disorder     Plan:  - Monitor hemodyanmics  - Vasopressor support to maintain MAP > 65  - Mechanical ventilation with lung protective strategy  - Adjust vent per ABG  - Change antibiotics to cefepime   - CT scan showing basilar pneumonia   - ID consult appreciated  - Cultures negative thus far   - Monitor renal function  - Viera for urinary obstruction   - Cont. LR, remains non oliguric though acidotic  - Plan for HD as per nephrology   - Tube feedings via NGT   - Restart home psych medications  - Cont. Levothyroxine  - DVT and stress ulcer prophylaxis  - Cont. ICU care for now

## 2022-12-02 NOTE — PROGRESS NOTE ADULT - SUBJECTIVE AND OBJECTIVE BOX
Podiatry HPI: Patient is seen in ICU bed, is intubated. Patient is unable to answer questions, seen fighting the vent. Patient appears uncomfortable during exam. Unable to obtain further history.     HPI:  62 year old morbid obese male, from [facility], has past medical hx of ambulatory dysfunction, ACD, bipolar disorder, BPH, CKD, HLD, PVD,  COPD not on oxygen, hypothyroidism, RBBB, lymphedema, CHF (last echo 2/22 normal EF, GIDD) was BIBEMS for hypotension 73/37 mmHg and bradycardia (40's). Story obtained from ED provider note as patient is not able to provide any history. Patient received 500 cc NS and one dose of atropine for bradycardia. Patient very somnolent during interview but arousable to verbal stimuli.  (30 Nov 2022 18:39)    Patient admits to  (-) Fevers, (-) Chills, (-) Nausea, (-) Vomiting, (-) Shortness of Breath (-) calf pain (-) chest pain     Medications albuterol    90 MICROgram(s) HFA Inhaler 2 Puff(s) Inhalation every 6 hours  azithromycin  IVPB 500 milliGRAM(s) IV Intermittent every 24 hours  buPROPion . 75 milliGRAM(s) Oral two times a day  cefTRIAXone   IVPB 1000 milliGRAM(s) IV Intermittent every 24 hours  chlorhexidine 0.12% Liquid 15 milliLiter(s) Oral Mucosa every 12 hours  chlorhexidine 2% Cloths 1 Application(s) Topical <User Schedule>  collagenase Ointment 1 Application(s) Topical two times a day  dexMEDEtomidine Infusion 0.3 MICROgram(s)/kG/Hr IV Continuous <Continuous>  fentaNYL   Infusion. 0.5 MICROgram(s)/kG/Hr IV Continuous <Continuous>  finasteride 5 milliGRAM(s) Oral daily  heparin   Injectable 5000 Unit(s) SubCutaneous every 8 hours  hydrocortisone sodium succinate Injectable 50 milliGRAM(s) IV Push every 6 hours  lactated ringers. 1000 milliLiter(s) IV Continuous <Continuous>  levothyroxine Injectable 94 MICROGram(s) IV Push at bedtime  mupirocin 2% Ointment 1 Application(s) Both Nostrils two times a day  norepinephrine Infusion 0.05 MICROgram(s)/kG/Min IV Continuous <Continuous>  pantoprazole  Injectable 40 milliGRAM(s) IV Push daily  polyethylene glycol 3350 17 Gram(s) Oral at bedtime  risperiDONE   Tablet 2 milliGRAM(s) Oral daily  senna Syrup 10 milliLiter(s) Oral at bedtime  sodium bicarbonate 325 milliGRAM(s) Oral every 6 hours  tamsulosin 0.4 milliGRAM(s) Oral at bedtime  valproic  acid Syrup 250 milliGRAM(s) Oral every 6 hours  zinc oxide 20% Ointment 1 Application(s) Topical two times a day    FHNo pertinent family history in first degree relatives    PMHNo pertinent past medical history    Hypothyroid    Hyperlipidemia    Schizophrenia    Schizoaffective disorder    Ambulatory dysfunction    Anemia    History of BPH    CKD (chronic kidney disease)    Chronic obstructive pulmonary disease (COPD)    Bipolar disorder    Bipolar disorder    Mood disorder    HLD (hyperlipidemia)    BPH (benign prostatic hyperplasia)    Anemia    Chronic diastolic congestive heart failure    Lymphedema       PSHNo significant past surgical history    No significant past surgical history        Labs                          7.9    12.76 )-----------( 250      ( 02 Dec 2022 06:45 )             24.4      12-02    142  |  112<H>  |  119<H>  ----------------------------<  131<H>  3.8   |  18<L>  |  5.61<H>    Ca    8.9      02 Dec 2022 05:11  Phos  5.6     12-02  Mg     2.5     12-02    TPro  6.1  /  Alb  1.8<L>  /  TBili  0.2  /  DBili  x   /  AST  16  /  ALT  19  /  AlkPhos  77  12-02     Vital Signs Last 24 Hrs  T(C): 36.7 (02 Dec 2022 11:49), Max: 37.8 (01 Dec 2022 21:00)  T(F): 98.1 (02 Dec 2022 11:49), Max: 100 (01 Dec 2022 21:00)  HR: 53 (02 Dec 2022 14:18) (38 - 104)  BP: 120/57 (02 Dec 2022 14:04) (103/44 - 130/50)  BP(mean): 76 (02 Dec 2022 14:04) (59 - 96)  RR: 30 (02 Dec 2022 14:18) (18 - 41)  SpO2: 100% (02 Dec 2022 14:18) (91% - 100%)    Parameters below as of 02 Dec 2022 07:00  Patient On (Oxygen Delivery Method): ventilator              WBC Count: 12.76 K/uL *H* (12-02-22 @ 06:45)  WBC Count: 11.91 K/uL *H* (12-02-22 @ 05:12)      ROS  REVIEW OF SYSTEMS:    CONSTITUTIONAL: unremarkable outside of PE      PHYSICAL EXAM  Vasc:  DP and PT pulses non palpable due to edema. TG: warm to warm. Pedal hair absent. Bilateral non pitting edema.   Derm: unchanged appearance of bilateral extremities on exam. B/L venous stasis changes to skin with hemosiderin deposits. Peau d'orange appearance of skin due to venous stasis weeping b/l. Small superficial fibrosis due to venous stasis. Skin slough due to edema. No purulence or drainage noted at b/l legs on exam. No infected wounds on inspection of b/l lower extremity; no rash noted at b/l legs. Mild IDM noted in all interspaces. Hypertrophic and elongated toenails x 10  Neuro: unable to assess  MSK: unable to assess        A:  Chronic bilateral venous stasis with hemosiderin deposits in skin    P:   Patient evaluated and Chart reviewed   Unable to discuss diagnosis with patient  Inspected b/l extremities for signs of infection, none found  Continue with IV antibiotics As Per ID  Recommend to reapply CAIR boots to avoid pressure ulcers  Podiatry to follow while in house.  Outpatient nail care as needed  Seen bedside and discussed with Dr. Ramírez  Discussed with Attending Dr. Navas

## 2022-12-02 NOTE — PROGRESS NOTE ADULT - ASSESSMENT
1. SHEYLA due to ATN  -BUN/Scr remain unchanged with fair UO. Since kidney function is not improving at 5s and elevated BUN with acidosis and also underlying CKD stage 4, pt progressed to   ESRD. CT scan shows no hydro. will plan for First HD today for 2hrs and HD again tomorrow. Brother gave consent to proceed with HD.   -UA shows no infection. f/u urine lytes (uosm, urine sodium, urine creatinine, chloride, potassium), spot protein to creatinine ratio  -Adjust meds to eGFR and avoid IV Gadolinium contrast,NSAIDs, and phosphate enema.  -Monitor I/O's daily.   -Monitor SMA daily.  2. CKD stage 4 most likely due to ischemic nephropathy  -baseline scr around 3.7mg/dL in july 2022.  -Keep patient euvolemic and renal diet  -Avoid Nephrotoxic Meds/ Agents such as (NSAIDs, IV contrast, Aminoglycosides such as gentamicin, -Gadolinium contrast, Phosphate containing enemas, etc..)  -Adjust Medications according to eGFR  3. Hypotension possible sepsis  -bp is stable and on one pressor.  4. Mineral Bone Disease:  -phos is improving and f/u Vitamin 25 OH, and PTH intact  -continue calcitriol 0.25mcg daily and renvela tid once taking oral meds.  5. HAGMA and respiratory acidosis:  -abg noted.   -still acidotic and will start dialysis for correction.  6. Bradycardia due to unclear etiology  -improving heart rate.  -trend troponins  -TSH is nl  7. Pulm:  -s/p intubated on 11/30 for worsening respiratory support.  -continue vent support  -plan as per MICU.     Patient is critically ill. Time Spent: 35mins.  Discussed the assessment and plan with ICU Team/Nurse 1. SHEYLA due to ATN  -BUN/Scr remain unchanged with fair UO. Since kidney function is not improving at 5s and elevated BUN with acidosis and also underlying CKD stage 4, pt progressed to   ESRD. CT scan shows no hydro. will plan for First HD today for 2hrs and HD again tomorrow. Brother gave consent to proceed with HD.   -UA shows no infection. f/u urine lytes (uosm, urine sodium, urine creatinine, chloride, potassium), spot protein to creatinine ratio  -Adjust meds to eGFR and avoid IV Gadolinium contrast,NSAIDs, and phosphate enema.  -Monitor I/O's daily.   -Monitor SMA daily.  2. CKD stage 4 most likely due to ischemic nephropathy  -baseline scr around 3.7mg/dL in july 2022.  -Keep patient euvolemic and renal diet  -Avoid Nephrotoxic Meds/ Agents such as (NSAIDs, IV contrast, Aminoglycosides such as gentamicin, -Gadolinium contrast, Phosphate containing enemas, etc..)  -Adjust Medications according to eGFR  3. Hypotension possible sepsis  -bp is stable and on one pressor.  4. Mineral Bone Disease:  -phos is improving and f/u Vitamin 25 OH, and PTH intact  -continue calcitriol 0.25mcg daily and renvela tid once taking oral meds.  5. HAGMA and respiratory acidosis:  -abg noted.   -still acidotic and will start dialysis for correction.  6. Bradycardia due to unclear etiology  -improving heart rate.  -trend troponins  -TSH is nl  7. Pulm:  -s/p intubated on 11/30 for worsening respiratory support.  -continue vent support  -plan as per MICU.   8. Anemia of ESRD:  -check iron panel and ferritin in am.  -order Epogen 8000units IV/SC TIW  -F/u CBC daily  -transfuse if HB < 7.0.      Patient is critically ill. Time Spent: 35mins.  Discussed the assessment and plan with ICU Team/Nurse

## 2022-12-02 NOTE — PROGRESS NOTE ADULT - ASSESSMENT
62 year old morbid obese male, from [facility], has past medical hx of ambulatory dysfunction, ACD, bipolar disorder, BPH, CKD, HLD, PVD, COPD not on oxygen, hypothyroidism, RBBB, lymphedema, CHF (last echo 2/22 normal EF, GIDD) was BIBEMS for hypotension 73/37 mmHg and bradycardia (40's). Patient admitted to ICU for shock and acute encephalopathy.     DX:  - Shock septic vs hypovolemia  - LE cellulitis   - Acute encephalopathy   - Sinus bradycardia  - HAGMA   - SHEYLA on CKD  - Anemia   - COPD not on oxygen at home   - HLD   - BPH  - Hypothyroidism   - Mood disorder       =================== Neuro============================  Lethargic, unable to follow commands     Pain meds with Tylenol   On Fentanyl for Sedation/Pain    [ ] Follow up CT head    ================= Cardiovascular==========================  Sinus bradycardia  - EKG sinus bradycardia, old RBBB  - F/u echo, troponin; downtrending, normal EF  - Monitor for now   - Will give atropine if needed, pads at bedside   - Given normal EF, no changes in EKG, cardiogenic shock less likely    Hypotension  - On Levophed, Dopamine  - IV Fluids 75cc/hr    ================- Pulm=================================  COPD  - Patient with hx of COPD not in exacerbation   - Chest X ray Showed bilateral patchy infiltrates, no concern for fluid overload    - Oxygen supplementation as needed to maintain SpO2 of 88-92%  - C/w home meds       ==================ID===================================  Shock  - Concern for sepsis vs hypovolemic   - Empirical treatment with Cefepime for LE cellulitis and for pseudomonal coverage  - S/p 4L NS   - Started on pressors   [ ]  F/u lactate, procalcitonin, Bcx, Ucx, CK  - ID consulted Dr Nolasco     ================= Nephro================================  SHEYLA on CKD  - On admission, patient with Cr 6.4>>5.6, baseline 3.7  - Worsening CKD most likely a combination of obstructive vs pre- renal   - Viera catheter placed in ED with 1000 cc of UO  - -2200L in the past 24 hours  - Avoid nephrotoxins, NSAIDS, ACEI and ARBS  - Monitor BMP daily  - Nephro Dr Valenzuela consulted    HAGMA  - Most likely from uremia   - ABG 7.2/44/97/17 >>7.31/37/79/20  - s/p bicarb drip  - Nephro Dr Valenzuela consulted    =================GI====================================  NPO for now, FS q 6hrs   NGT Today    ================ Heme==================================  Anemia   - Anemia of chronic disease   - Monitor CBC daily   - Hgb Stable no signs of active bleed    =================Endocrine===============================  Hypothyroidism  - F/u TSH Normal   - C/w home meds when not NPO     ================= Skin/Catheters============================  Peripheral IV lines   Viera catheter   A line and CVC   Sacral and Posterior Thigh Ulcers, Wound Care Consulted    =================Prophylaxis =============================  DVT prophylaxis with Heparin SQ daily   GI prophylaxis with PPI daily    ==================GOC==================================  FULL CODE          62 year old morbid obese male, from [facility], has past medical hx of ambulatory dysfunction, ACD, bipolar disorder, BPH, CKD, HLD, PVD, COPD not on oxygen, hypothyroidism, RBBB, lymphedema, CHF (last echo 2/22 normal EF, GIDD) was BIBEMS for hypotension 73/37 mmHg and bradycardia (40's). Patient admitted to ICU for shock and acute encephalopathy.     DX:  - Shock septic vs hypovolemia  - LE cellulitis   - Acute encephalopathy   - Sinus bradycardia  - HAGMA   - SHEYLA on CKD  - Anemia   - COPD not on oxygen at home   - HLD   - BPH  - Hypothyroidism   - Mood disorder       =================== Neuro============================  #Acute Encephalopathy; likely in the setting of acidemia, infection, uremia    Lethargic, fighting vent    Pain meds with Tylenol   On Precedex for Sedation/Pain  CT Head negative for acute bleed/swelling  [ ] EEG      ================= Cardiovascular==========================  Sinus bradycardia  - EKG sinus bradycardia, old RBBB  - F/u echo, troponin; downtrending, normal EF  - Monitor for now   - Will give atropine if needed, pads at bedside   - Given normal EF, no changes in EKG, cardiogenic shock less likely    Hypotension  - On Levophed, Dopamine  - IV Fluids 75cc/hr    ================- Pulm=================================  COPD  - Ventilated on 20/450/60%/8  - Patient with hx of COPD not in exacerbation   - Chest X ray Showed bilateral patchy infiltrates, no concern for fluid overload    - Oxygen supplementation as needed to maintain SpO2 of 88-92%  - C/w home meds       ==================ID===================================  Shock  - Concern for sepsis vs hypovolemic   - Empirical treatment with Cefepime for LE cellulitis and for pseudomonal coverage  - S/p 4L NS   - Started on pressors   F/u lactate 1.4, procalcitonin, Bcx, Ucx, CK NGTD  - ID consulted Dr Nolasco     ================= Nephro================================  SHEYLA on CKD  - On admission, patient with Cr 6.4>>5.6, baseline 3.7  - Worsening CKD most likely a combination of obstructive vs pre- renal   - Viera catheter placed in ED with 1000 cc of UO  - -2200L in the past 24 hours  - Avoid nephrotoxins, NSAIDS, ACEI and ARBS  - Monitor BMP daily  - Nephro Dr Valenzuela consulted  [ ] Dialysis today  [ ] Establish Line access via vascular    HAGMA  - Most likely from uremia   - ABG 7.2/44/97/17 >>7.34/34/102/18  - Resume Bicarb 325 Q6 hours  - Nephro Dr Valenzuela consulted    =================GI====================================  NGT in place  Start tube feeds  Resume psych PO medications- Wellbutrin  [ ] Bowel regimen, stool impaction    ================ Heme==================================  Anemia   - Anemia of chronic disease   - Monitor CBC daily   - Hgb Stable no signs of active bleed    =================Endocrine===============================  Hypothyroidism  - F/u TSH Normal   - C/w home meds when not NPO     ================= Skin/Catheters============================  Peripheral IV lines   Viera catheter   A line and CVC   Sacral and Posterior Thigh Ulcers, Wound Care Consulted  [ ] Pending dialysis access    =================Prophylaxis =============================  DVT prophylaxis with Heparin SQ daily   GI prophylaxis with PPI daily    ==================GOC==================================  FULL CODE

## 2022-12-02 NOTE — PROGRESS NOTE ADULT - SUBJECTIVE AND OBJECTIVE BOX
NEPHROLOGY MEDICAL CARE, [facility] - Dr. Taj Valenzuela/ Dr. Hazel Solis/ Dr. Cosmo Root/ Dr. Fang Gifford    Patient was seen and examined at bedside.    CC: patient is intubated and on one pressor    Vital Signs Last 24 Hrs  T(C): 36.7 (02 Dec 2022 11:49), Max: 37.8 (01 Dec 2022 21:00)  T(F): 98.1 (02 Dec 2022 11:49), Max: 100 (01 Dec 2022 21:00)  HR: 47 (02 Dec 2022 16:00) (38 - 104)  BP: 113/51 (02 Dec 2022 16:00) (111/43 - 130/50)  BP(mean): 70 (02 Dec 2022 16:00) (59 - 96)  RR: 30 (02 Dec 2022 16:00) (17 - 41)  SpO2: 100% (02 Dec 2022 16:00) (91% - 100%)    Parameters below as of 02 Dec 2022 07:00  Patient On (Oxygen Delivery Method): ventilator         @ : @ 07:00  --------------------------------------------------------  IN: 3042.5 mL / OUT: 2000 mL / NET: 1042.5 mL     @ : @ 16:05  --------------------------------------------------------  IN: 830 mL / OUT: 710 mL / NET: 120 mL        PHYSICAL EXAM:  General: No acute respiratory distress on vent; obese.  Eyes: conjunctiva and sclera clear  ENMT: Atraumatic, Normocephalic; ET on vent  Respiratory: Bilateral poor air entry  Cardiovascular: S1S2+; no m/r/g  Gastrointestinal: Soft, Non-tender, Nondistended; Bowel sounds present,   : daley's cath with yellowish urine.  Neuro:  sedated.  Ext:  vascular skin changes of both legs; No Cyanosis  Skin: No visible rashes  Dialysis Access: Rt femoral shiley    MEDICATIONS:  MEDICATIONS  (STANDING):  albuterol    90 MICROgram(s) HFA Inhaler 2 Puff(s) Inhalation every 6 hours  azithromycin  IVPB 500 milliGRAM(s) IV Intermittent every 24 hours  buPROPion . 75 milliGRAM(s) Oral two times a day  cefTRIAXone   IVPB 1000 milliGRAM(s) IV Intermittent every 24 hours  chlorhexidine 0.12% Liquid 15 milliLiter(s) Oral Mucosa every 12 hours  chlorhexidine 2% Cloths 1 Application(s) Topical <User Schedule>  collagenase Ointment 1 Application(s) Topical two times a day  dexMEDEtomidine Infusion 0.3 MICROgram(s)/kG/Hr (8.54 mL/Hr) IV Continuous <Continuous>  fentaNYL   Infusion. 1 MICROgram(s)/kG/Hr (11.4 mL/Hr) IV Continuous <Continuous>  finasteride 5 milliGRAM(s) Oral daily  heparin   Injectable 5000 Unit(s) SubCutaneous every 8 hours  hydrocortisone sodium succinate Injectable 50 milliGRAM(s) IV Push every 6 hours  lactated ringers. 1000 milliLiter(s) (75 mL/Hr) IV Continuous <Continuous>  levothyroxine Injectable 94 MICROGram(s) IV Push at bedtime  mupirocin 2% Ointment 1 Application(s) Both Nostrils two times a day  norepinephrine Infusion 0.05 MICROgram(s)/kG/Min (5.34 mL/Hr) IV Continuous <Continuous>  pantoprazole  Injectable 40 milliGRAM(s) IV Push daily  polyethylene glycol 3350 17 Gram(s) Oral at bedtime  risperiDONE   Tablet 2 milliGRAM(s) Oral daily  senna Syrup 10 milliLiter(s) Oral at bedtime  sodium bicarbonate 325 milliGRAM(s) Oral every 6 hours  tamsulosin 0.4 milliGRAM(s) Oral at bedtime  valproic  acid Syrup 250 milliGRAM(s) Oral every 6 hours  zinc oxide 20% Ointment 1 Application(s) Topical two times a day    MEDICATIONS  (PRN):          LABS:                        7.9    12.76 )-----------( 250      ( 02 Dec 2022 06:45 )             24.4     12-02    142  |  112<H>  |  119<H>  ----------------------------<  131<H>  3.8   |  18<L>  |  5.61<H>    Ca    8.9      02 Dec 2022 05:11  Phos  5.6       Mg     2.5         TPro  6.1  /  Alb  1.8<L>  /  TBili  0.2  /  DBili  x   /  AST  16  /  ALT  19  /  AlkPhos  77      PT/INR - ( 01 Dec 2022 00:49 )   PT: 11.8 sec;   INR: 0.99 ratio         PTT - ( 01 Dec 2022 00:49 )  PTT:35.0 sec  Urinalysis Basic - ( 01 Dec 2022 00:14 )    Color: Yellow / Appearance: Clear / S.005 / pH: x  Gluc: x / Ketone: Negative  / Bili: Negative / Urobili: Negative   Blood: x / Protein: 30 mg/dL / Nitrite: Negative   Leuk Esterase: Negative / RBC: 2-5 /HPF / WBC 3-5 /HPF   Sq Epi: x / Non Sq Epi: Few /HPF / Bacteria: Few /HPF      Magnesium, Serum: 2.5 mg/dL ( @ 05:11)  Phosphorus Level, Serum: 5.6 mg/dL ( @ 05:11)    Urine studies    PTH and Vit D:

## 2022-12-02 NOTE — CONSULT NOTE ADULT - ASSESSMENT
62M with  multiple comorbidities including CKD, HLD, PVD, ACD, COPD, hypothyroidism, RBBB, lymphedema, CHF  a/w encephalopathy, hypotensive shock, uremia, SHEYLA on CKD requiring hemodilaysis    Right femoral shiley placed at bedside in sterile fashion; dialyze as per nephrology   Diligent wound care for weeping lower legs  Discussed with Dr. Pinzon

## 2022-12-02 NOTE — CONSULT NOTE ADULT - SUBJECTIVE AND OBJECTIVE BOX
SURGERY CONSULTATION NOTE    HPI: 63 y/o morbidly obese male from [facility] with PMHx bipolar disorder, BPH, CKD, HLD, PVD, ACD, COPD, hypothyroidism, RBBB, lymphedema, CHF (last echo 2/22 normal EF, GIDD) was BIBEMS for hypotension 73/37 mmHg and bradycardia (40's). Patient received 500 cc NS and one dose of atropine for bradycardia. Patient very somnolent during interview but arousable to verbal stimuli. Admitted to ICU for hypotensive shock with encephalopathy, intubated and sedated, on pressor support. Pt uremic, with acute on chronic kidney disease and hyperkalemia. Vascular surgery consulted for temporary dialysis catheter placement.     REVIEW OF SYSTEMS:   Negative except stated above in HPI    Allergies:  No Known Allergies    MEDICATIONS:  heparin   Injectable 5000 Unit(s) SubCutaneous every 8 hours  norepinephrine Infusion 0.05 MICROgram(s)/kG/Min IV Continuous <Continuous>  azithromycin  IVPB 500 milliGRAM(s) IV Intermittent every 24 hours  cefTRIAXone   IVPB 1000 milliGRAM(s) IV Intermittent every 24 hours  albuterol    90 MICROgram(s) HFA Inhaler 2 Puff(s) Inhalation every 6 hours  buPROPion . 75 milliGRAM(s) Oral two times a day  dexMEDEtomidine Infusion 0.3 MICROgram(s)/kG/Hr IV Continuous <Continuous>  fentaNYL   Infusion. 0.5 MICROgram(s)/kG/Hr IV Continuous <Continuous>  risperiDONE   Tablet 2 milliGRAM(s) Oral daily  valproic  acid Syrup 250 milliGRAM(s) Oral every 6 hours  pantoprazole  Injectable 40 milliGRAM(s) IV Push daily  polyethylene glycol 3350 17 Gram(s) Oral at bedtime  senna Syrup 10 milliLiter(s) Oral at bedtime  finasteride 5 milliGRAM(s) Oral daily  hydrocortisone sodium succinate Injectable 50 milliGRAM(s) IV Push every 6 hours  levothyroxine Injectable 94 MICROGram(s) IV Push at bedtime  chlorhexidine 0.12% Liquid 15 milliLiter(s) Oral Mucosa every 12 hours  chlorhexidine 2% Cloths 1 Application(s) Topical <User Schedule>  collagenase Ointment 1 Application(s) Topical two times a day  lactated ringers. 1000 milliLiter(s) IV Continuous <Continuous>  sodium bicarbonate 325 milliGRAM(s) Oral every 6 hours  tamsulosin 0.4 milliGRAM(s) Oral at bedtime  zinc oxide 20% Ointment 1 Application(s) Topical two times a day      PAST MEDICAL & SURGICAL HISTORY:  Hypothyroid  Hyperlipidemia  Ambulatory dysfunction  Anemia  History of BPH  CKD (chronic kidney disease)  Chronic obstructive pulmonary disease (COPD)  Bipolar disorder  HLD (hyperlipidemia)  BPH (benign prostatic hyperplasia)  Chronic diastolic congestive heart failure  Lymphedema  No significant past surgical history    VITALS:  T(C): 36.9 (12-02-22 @ 10:45), Max: 37.9 (12-01-22 @ 13:15)  HR: 63 (12-02-22 @ 10:45) (54 - 92)  BP: 111/43 (12-02-22 @ 10:00) (103/44 - 130/50)  RR: 30 (12-02-22 @ 10:45) (18 - 31)  SpO2: 100% (12-02-22 @ 10:45) (91% - 100%)    I&O's Summary  01 Dec 2022 07:01  -  02 Dec 2022 07:00  --------------------------------------------------------  IN: 3042.5 mL / OUT: 2000 mL / NET: 1042.5 mL  02 Dec 2022 07:01  -  02 Dec 2022 13:02  --------------------------------------------------------  IN: 366 mL / OUT: 0 mL / NET: 366 mL    PHYSICAL EXAM:   General: intubated, sedated  Respiratory: on vent  Abdomen: obese, soft  Extremities: hyperkeratotic skin changes to b/l lower legs with weeping skin; b/l feet warm    LABS:	 	  CBC Full  -  ( 02 Dec 2022 06:45 )  WBC Count : 12.76 K/uL  Hemoglobin : 7.9 g/dL  Hematocrit : 24.4 %  Platelet Count - Automated : 250 K/uL  Mean Cell Volume : 93.1 fl  Mean Cell Hemoglobin : 30.2 pg  Mean Cell Hemoglobin Concentration : 32.4 gm/dL    12-02  142  |  112<H>  |  119<H>  ----------------------------<  131<H>  3.8   |  18<L>  |  5.61<H>  12-01    138  |  106  |  124<H>  ----------------------------<  112<H>  4.0   |  20<L>  |  5.82<H>    Ca    8.9      02 Dec 2022 05:11  Ca    8.8      01 Dec 2022 15:30  Phos  5.6     12-02  Phos  5.3     12-01  Mg     2.5     12-02  Mg     2.5     12-01    TPro  6.1  /  Alb  1.8<L>  /  TBili  0.2  /  DBili  x   /  AST  16  /  ALT  19  /  AlkPhos  77  12-02  TPro  6.2  /  Alb  1.9<L>  /  TBili  0.2  /  DBili  x   /  AST  22  /  ALT  24  /  AlkPhos  83  12-01

## 2022-12-03 NOTE — CONSULT NOTE ADULT - CRITICAL CARE ATTENDING COMMENT
I counseled the primary team about the patient's differential diagnoses, as well as the testing and treatment indicated for evaluation and management of the patient's jerking movements.

## 2022-12-03 NOTE — PROGRESS NOTE ADULT - SUBJECTIVE AND OBJECTIVE BOX
Podiatry HPI: Patient is seen in ICU bed, is intubated and without CAIR boots. Patient is unable to answer questions however; visibly uncomfortably with movement of b/l legs upon physical exam. Unable to obtain further history.     HPI:  62 year old morbid obese male, from [facility], has past medical hx of ambulatory dysfunction, ACD, bipolar disorder, BPH, CKD, HLD, PVD,  COPD not on oxygen, hypothyroidism, RBBB, lymphedema, CHF (last echo 2/22 normal EF, GIDD) was BIBEMS for hypotension 73/37 mmHg and bradycardia (40's). Story obtained from ED provider note as patient is not able to provide any history. Patient received 500 cc NS and one dose of atropine for bradycardia. Patient very somnolent during interview but arousable to verbal stimuli.  (30 Nov 2022 18:39)    Patient admits to  (-) Fevers, (-) Chills, (-) Nausea, (-) Vomiting, (-) Shortness of Breath (-) calf pain (-) chest pain     Medications albuterol    90 MICROgram(s) HFA Inhaler 2 Puff(s) Inhalation every 6 hours  azithromycin  IVPB 500 milliGRAM(s) IV Intermittent every 24 hours  buPROPion . 75 milliGRAM(s) Oral two times a day  cefTRIAXone   IVPB 1000 milliGRAM(s) IV Intermittent every 24 hours  chlorhexidine 0.12% Liquid 15 milliLiter(s) Oral Mucosa every 12 hours  chlorhexidine 2% Cloths 1 Application(s) Topical <User Schedule>  collagenase Ointment 1 Application(s) Topical two times a day  epoetin madyson-epbx (RETACRIT) Injectable 8000 Unit(s) IV Push <User Schedule>  fentaNYL   Infusion. 1 MICROgram(s)/kG/Hr IV Continuous <Continuous>  finasteride 5 milliGRAM(s) Oral daily  heparin   Injectable 5000 Unit(s) SubCutaneous every 8 hours  hydrocortisone sodium succinate Injectable 50 milliGRAM(s) IV Push every 6 hours  levothyroxine Injectable 94 MICROGram(s) IV Push at bedtime  midazolam Injectable 4 milliGRAM(s) IV Push every 4 hours PRN  mupirocin 2% Ointment 1 Application(s) Both Nostrils two times a day  norepinephrine Infusion 0.05 MICROgram(s)/kG/Min IV Continuous <Continuous>  pantoprazole  Injectable 40 milliGRAM(s) IV Push daily  polyethylene glycol 3350 17 Gram(s) Oral at bedtime  propofol Infusion 10 MICROgram(s)/kG/Min IV Continuous <Continuous>  risperiDONE   Tablet 2 milliGRAM(s) Oral daily  senna Syrup 10 milliLiter(s) Oral at bedtime  sodium bicarbonate 325 milliGRAM(s) Oral every 6 hours  tamsulosin 0.4 milliGRAM(s) Oral at bedtime  valproic  acid Syrup 250 milliGRAM(s) Oral every 6 hours  zinc oxide 20% Ointment 1 Application(s) Topical two times a day    FHNo pertinent family history in first degree relatives    ,   PMHNo pertinent past medical history    Hypothyroid    Hyperlipidemia    Schizophrenia    Schizoaffective disorder    Ambulatory dysfunction    Anemia    History of BPH    CKD (chronic kidney disease)    Chronic obstructive pulmonary disease (COPD)    Bipolar disorder    Bipolar disorder    Mood disorder    HLD (hyperlipidemia)    BPH (benign prostatic hyperplasia)    Anemia    Chronic diastolic congestive heart failure    Lymphedema       PSHNo significant past surgical history    No significant past surgical history        Labs                          7.2    9.97  )-----------( 182      ( 03 Dec 2022 04:54 )             22.2      12-03    141  |  109<H>  |  93<H>  ----------------------------<  115<H>  3.9   |  23  |  4.32<H>    Ca    8.8      03 Dec 2022 04:54  Phos  4.6     12-03  Mg     2.4     12-03    TPro  6.1  /  Alb  1.8<L>  /  TBili  0.2  /  DBili  x   /  AST  20  /  ALT  15  /  AlkPhos  74  12-02     Vital Signs Last 24 Hrs  T(C): 37.3 (03 Dec 2022 04:15), Max: 37.3 (03 Dec 2022 04:15)  T(F): 99.1 (03 Dec 2022 04:15), Max: 99.1 (03 Dec 2022 04:15)  HR: 83 (03 Dec 2022 07:45) (38 - 108)  BP: 106/69 (03 Dec 2022 07:45) (103/42 - 146/118)  BP(mean): 81 (03 Dec 2022 07:45) (59 - 128)  RR: 29 (03 Dec 2022 07:45) (17 - 41)  SpO2: 99% (03 Dec 2022 07:45) (91% - 100%)    Parameters below as of 02 Dec 2022 15:55  Patient On (Oxygen Delivery Method): ventilator              WBC Count: 9.97 K/uL (12-03-22 @ 04:54)      ROS unremarkable outside of HPI    CONSTITUTIONAL: unremarkable outside of PE      PHYSICAL EXAM  Vasc:  DP and PT pulses non palpable due to b/l edema. TG: warm to warm. Pedal hair absent. Bilateral non pitting edema to LE.   Derm: unchanged appearance of bilateral extremities on exam. B/L venous stasis changes to skin with hemosiderin deposits. Peau d'orange appearance of skin due to venous stasis weeping b/l. Small superficial fibrosis due to venous stasis. Skin slough due to edema. No purulence or drainage noted at b/l legs on exam. No infected wounds on inspection of b/l lower extremity; no rash noted at b/l legs. Mild IDM noted in all interspaces. Hypertrophic and elongated toenails x 10  Neuro: unable to assess  MSK: unable to assess        A:  Chronic bilateral venous stasis with hemosiderin deposits in skin    P:   Patient evaluated and Chart reviewed   Unable to discuss diagnosis with patient  Inspected b/l extremities for signs of infection, none found  Continue with IV antibiotics As Per ID  Recommend to reapply CAIR boots to avoid pressure ulcers if tolerated by patient   Podiatry to follow while in house.  Outpatient nail care as needed  Seen bedside and discussed with Dr. Ramírez  Discussed with Attending Dr. Navas

## 2022-12-03 NOTE — PROGRESS NOTE ADULT - ASSESSMENT
62 year old morbid obese male, from [facility], has past medical hx of ambulatory dysfunction, ACD, bipolar disorder, BPH, CKD, HLD, PVD, COPD not on oxygen, hypothyroidism, RBBB, lymphedema, CHF (last echo 2/22 normal EF, GIDD) was BIBEMS for hypotension 73/37 mmHg and bradycardia (40's). Patient admitted to ICU for shock and acute encephalopathy.     DX:  - Shock septic vs hypovolemia  - LE cellulitis   - Acute encephalopathy   - Sinus bradycardia  - HAGMA   - SHEYLA on CKD  - Anemia   - COPD not on oxygen at home   - HLD   - BPH  - Hypothyroidism   - Mood disorder       =================== Neuro============================  #Acute Encephalopathy; likely in the setting of acidemia, infection, uremia    Lethargic, fighting vent    Pain meds with Tylenol   On Precedex for Sedation/Pain  CT Head negative for acute bleed/swelling    #Seizure  - Home med includes valproic acid  - Patient is having repeated muscle spasms, improved on Versed but not fully stopped  - Still having muscle movements after dialysis which brought down BUN  - Started propofol instead of precedex  - Valproic acid level low, likely due to missed doses when patient was originally admitted.  - [ ] EEG    ================= Cardiovascular==========================  Sinus bradycardia  - EKG sinus bradycardia, old RBBB  - F/u echo, troponin; downtrending, normal EF  - Monitor for now   - Will give atropine if needed, pads at bedside   - Given normal EF, no changes in EKG, cardiogenic shock less likely    Hypotension  - On Levophed, Dopamine  - IV Fluids 75cc/hr    ================- Pulm=================================  COPD  - Ventilated on 20/450/60%/8  - Patient with hx of COPD not in exacerbation   - Chest X ray Showed bilateral patchy infiltrates, no concern for fluid overload    - Oxygen supplementation as needed to maintain SpO2 of 88-92%  - C/w home meds       ==================ID===================================  Shock  - Concern for sepsis vs hypovolemic   - Empirical treatment with Cefepime for LE cellulitis and for pseudomonal coverage  - S/p 4L NS   - Started on pressors   F/u lactate 1.4, procalcitonin, Bcx, Ucx, CK NGTD  - ID consulted Dr Nolasco     ================= Nephro================================  SHEYLA on CKD  - On admission, patient with Cr 6.4>>5.6, baseline 3.7  - Worsening CKD most likely a combination of obstructive vs pre- renal   - Viera catheter placed in ED with 1000 cc of UO  - -2200L in the past 24 hours  - Avoid nephrotoxins, NSAIDS, ACEI and ARBS  - Monitor BMP daily  - Nephro Dr Valenzuela consulted  - S/p Dialysis 12/2  [ ] Dialysis 12/3  [ ]Establish Line access via vascular    HAGMA  - Most likely from uremia   - ABG 7.2/44/97/17 >>7.34/34/102/18  - Resume Bicarb 325 Q6 hours  - Nephro Dr Valenzuela consulted    =================GI====================================  NGT in place  Start tube feeds  Resume psych PO medications- Wellbutrin  [ ] Bowel regimen, stool impaction    ================ Heme==================================  Anemia   - Anemia of chronic disease   - Monitor CBC daily   - Hgb Stable no signs of active bleed    =================Endocrine===============================  Hypothyroidism  - F/u TSH Normal   - C/w home meds when not NPO     ================= Skin/Catheters============================  Peripheral IV lines   Viera catheter   A line and CVC   Sacral and Posterior Thigh Ulcers, Wound Care Consulted  [ ] Pending dialysis access    =================Prophylaxis =============================  DVT prophylaxis with Heparin SQ daily   GI prophylaxis with PPI daily    ==================GOC==================================  FULL CODE

## 2022-12-03 NOTE — CONSULT NOTE ADULT - SUBJECTIVE AND OBJECTIVE BOX
NEUROLOGY CONSULT NOTE    NAME:  GAYLA PEARCE      ASSESSMENT:  62M with recurrent jerks in the setting of end-stage renal disease on hemodialysis and shock, concerning for epileptic seizures vs. myoclonic jerks      RECOMMENDATIONS:    - Routine EEG shows no subclinical seizures - Continuous EEG is approved to monitor for emergence of subclinical seizures as sedation is weaned    - Continue Levetiracetam 500mg PO BID    - Continue Valproic acid syrup 650mg PO QID    - Check Valproic acid and Levetiracetam levels in the morning    - Respiratory management and infectious workup as per primary team    - DVT ppx: SCDs, Heparin        NOTE TO BE COMPLETED - PLEASE REFER TO ABOVE ONLY AND IGNORE INFORMATION BELOW    *******************************      CHIEF COMPLAINT:  Patient is a 62y old  Male who presents with a chief complaint of Shock, Acute Encephalopathy (04 Dec 2022 03:44)      HPI:  62 year old morbid obese male, from [facility], has past medical hx of ambulatory dysfunction, ACD, bipolar disorder, BPH, CKD, HLD, PVD,  COPD not on oxygen, hypothyroidism, RBBB, lymphedema, CHF (last echo 2/22 normal EF, GIDD) was BIBEMS for hypotension 73/37 mmHg and bradycardia (40's). Story obtained from ED provider note as patient is not able to provide any history. Patient received 500 cc NS and one dose of atropine for bradycardia. Patient very somnolent during interview but arousable to verbal stimuli.  (30 Nov 2022 18:39)      NEURO HPI:      PAST MEDICAL & SURGICAL HISTORY:  Hypothyroid      Hyperlipidemia      Ambulatory dysfunction      Anemia      History of BPH      CKD (chronic kidney disease)      Chronic obstructive pulmonary disease (COPD)      Bipolar disorder      HLD (hyperlipidemia)      BPH (benign prostatic hyperplasia)      Chronic diastolic congestive heart failure      Lymphedema      No significant past surgical history          MEDICATIONS:  albuterol    90 MICROgram(s) HFA Inhaler 2 Puff(s) Inhalation every 6 hours  azithromycin  IVPB 500 milliGRAM(s) IV Intermittent every 24 hours  cefTRIAXone   IVPB 2000 milliGRAM(s) IV Intermittent every 24 hours  chlorhexidine 0.12% Liquid 15 milliLiter(s) Oral Mucosa every 12 hours  chlorhexidine 2% Cloths 1 Application(s) Topical <User Schedule>  collagenase Ointment 1 Application(s) Topical two times a day  epoetin madyson-epbx (RETACRIT) Injectable 8000 Unit(s) IV Push <User Schedule>  fentaNYL   Infusion. 1 MICROgram(s)/kG/Hr IV Continuous <Continuous>  finasteride 5 milliGRAM(s) Oral daily  heparin   Injectable 5000 Unit(s) SubCutaneous every 8 hours  hydrocortisone sodium succinate Injectable 50 milliGRAM(s) IV Push every 6 hours  levETIRAcetam  IVPB 500 milliGRAM(s) IV Intermittent every 12 hours  levothyroxine 125 MICROGram(s) Oral daily  midazolam Injectable 4 milliGRAM(s) IV Push every 4 hours PRN  mupirocin 2% Ointment 1 Application(s) Both Nostrils two times a day  norepinephrine Infusion 0.05 MICROgram(s)/kG/Min IV Continuous <Continuous>  pantoprazole  Injectable 40 milliGRAM(s) IV Push daily  polyethylene glycol 3350 17 Gram(s) Oral at bedtime  propofol Infusion 10 MICROgram(s)/kG/Min IV Continuous <Continuous>  risperiDONE   Tablet 2 milliGRAM(s) Oral daily  senna Syrup 10 milliLiter(s) Oral at bedtime  tamsulosin 0.4 milliGRAM(s) Oral at bedtime  valproic  acid Syrup 250 milliGRAM(s) Oral every 6 hours  zinc oxide 20% Ointment 1 Application(s) Topical two times a day      ALLERGIES:  No Known Allergies      FAMILY HISTORY:        SOCIAL HISTORY:  Denies alcohol, tobacco, or illicit drug use      REVIEW OF SYSTEMS:  GENERAL: No fever, weight changes, fatigue  EYES: No eye pain or discharge  EAR/NOSE/MOUTH/THROAT: No sinus or throat pain; No difficulty hearing  NECK: No pain or stiffness  RESPIRATORY: No cough, wheezing, chills, or hemoptysis  CARDIOVASCULAR: No chest pain, palpitations, shortness of breath, or dyspnea on exertion  GASTROINTESTINAL: No abdominal pain, nausea, vomiting, hematemesis, diarrhea, or constipation  GENITOURINARY: No dysuria, frequency, hematuria, or incontinence  SKIN: No rashes or lesions  ENDOCRINE: No heat or cold intolerance  HEMATOLOGIC: No easy bruising or bleeding  PSYCHIATRIC: No depression, anxiety, or mood swings  MUSCULOSKELETAL: No joint pain or swelling  NEUROLOGICAL: As per HPI        OBJECTIVE:    Vital Signs Last 24 Hrs  T(C): 36.6 (04 Dec 2022 04:03), Max: 36.6 (04 Dec 2022 04:03)  T(F): 97.9 (04 Dec 2022 04:03), Max: 97.9 (04 Dec 2022 04:03)  HR: 49 (04 Dec 2022 05:45) (33 - 114)  BP: 116/37 (03 Dec 2022 10:15) (95/38 - 116/37)  BP(mean): 62 (03 Dec 2022 10:15) (55 - 90)  RR: 24 (04 Dec 2022 05:45) (18 - 30)  SpO2: 98% (04 Dec 2022 05:45) (94% - 100%)    Parameters below as of 03 Dec 2022 23:00  Patient On (Oxygen Delivery Method): ventilator    O2 Concentration (%): 40    General Examination:  General: No acute distress  HEENT: Atraumatic, Normocephalic  Respiratory: CTA B/l.  No crackles, rhonchi, or wheezes.  Cardiovascular: RRR.  Normal S1 & S2.  Normal b/l radial and pedal pulses.    Neurological Examination:  General / Mental Status: AAO x 3.  No aphasia or dysarthria.  Naming and repetition intact.  Cranial Nerves: VFF x 4.  PERRL.  EOMI x 2, No nystagmus or diplopia.  B/l V1-V3 equal and intact to light touch and pinprick.  Symmetric facial movement and palate elevation.  B/l hearing equal to finger rub.  5/5 strength with b/l sternocleidomastoid & trapezius.  Midline tongue protrusion, with no atrophy or fasciculations.  Motor: Normal bulk & tone in all four extremities.  5/5 strength throughout all four extremities.  No downward drift, rigidity, spasticity, or tremors in any of the four extremities.  Sensory: Intact to light touch and pinprick in all four extremities.  Negative Romberg.  Reflex: 2+ and symmetric at b/l biceps, triceps, brachioradialis, patellae, and ankles.  Downgoing toes b/l.  Coordination: No dysmetria with b/l finger-to-nose and heel raise tests.  Symmetric rapid alternating movements b/l.  Gait: Normal, narrow-based gait.  No difficulty with tiptoe, heel, and tandem gaits.        LABORATORY VALUES:                        7.0    10.09 )-----------( 165      ( 04 Dec 2022 03:50 )             21.6       12-04    144  |  106  |  63<H>  ----------------------------<  128<H>  3.4<L>   |  28  |  3.00<H>    Ca    8.9      04 Dec 2022 03:50  Phos  4.1     12-04  Mg     2.2     12-04    TPro  6.1  /  Alb  1.8<L>  /  TBili  0.2  /  DBili  x   /  AST  20  /  ALT  15  /  AlkPhos  74  12-02    LIVER FUNCTIONS - ( 02 Dec 2022 19:09 )  Alb: 1.8 g/dL / Pro: 6.1 g/dL / ALK PHOS: 74 U/L / ALT: 15 U/L DA / AST: 20 U/L / GGT: x                                 NEUROIMAGING:          Please contact the Neurology consult service with any neurological questions.    Eric Rose MD   of Neurology  [facility] NEUROLOGY CONSULT NOTE    NAME:  GAYLA PEARCE      ASSESSMENT:  62M with recurrent jerks in the setting of end-stage renal disease on hemodialysis and shock, concerning for epileptic seizures vs. myoclonic jerks      RECOMMENDATIONS:    - Routine EEG shows no subclinical seizures - Continuous EEG is approved to monitor for emergence of subclinical seizures as sedation is weaned    - Continue Levetiracetam 500mg PO BID    - Continue Valproic acid syrup 650mg PO QID    - Check Valproic acid and Levetiracetam levels in the morning    - Respiratory management and infectious workup as per primary team    - DVT ppx: SCDs, Heparin        *******************************      CHIEF COMPLAINT:  Patient is a 62y old  Male who presents with a chief complaint of Shock, Acute Encephalopathy (04 Dec 2022 03:44)      HPI:  62 year old morbid obese male, from [facility], has past medical hx of ambulatory dysfunction, ACD, bipolar disorder, BPH, CKD, HLD, PVD,  COPD not on oxygen, hypothyroidism, RBBB, lymphedema, CHF (last echo 2/22 normal EF, GIDD) was BIBEMS for hypotension 73/37 mmHg and bradycardia (40's). Story obtained from ED provider note as patient is not able to provide any history. Patient received 500 cc NS and one dose of atropine for bradycardia. Patient very somnolent during interview but arousable to verbal stimuli.  (30 Nov 2022 18:39)      NEURO HPI:  62 LHM presenting after experiencing bradycardia and hypotension, and then observed to have shaking of both arms and hands while being managed in the ICU.      PAST MEDICAL & SURGICAL HISTORY:  Hypothyroid  Hyperlipidemia  Ambulatory dysfunction  Anemia  History of BPH  CKD (chronic kidney disease)  Chronic obstructive pulmonary disease (COPD)  Bipolar disorder  HLD (hyperlipidemia)  BPH (benign prostatic hyperplasia)  Chronic diastolic congestive heart failure  Lymphedema  No significant past surgical history      MEDICATIONS:  albuterol    90 MICROgram(s) HFA Inhaler 2 Puff(s) Inhalation every 6 hours  azithromycin  IVPB 500 milliGRAM(s) IV Intermittent every 24 hours  cefTRIAXone   IVPB 2000 milliGRAM(s) IV Intermittent every 24 hours  chlorhexidine 0.12% Liquid 15 milliLiter(s) Oral Mucosa every 12 hours  chlorhexidine 2% Cloths 1 Application(s) Topical <User Schedule>  collagenase Ointment 1 Application(s) Topical two times a day  epoetin madyson-epbx (RETACRIT) Injectable 8000 Unit(s) IV Push <User Schedule>  fentaNYL   Infusion. 1 MICROgram(s)/kG/Hr IV Continuous <Continuous>  finasteride 5 milliGRAM(s) Oral daily  heparin   Injectable 5000 Unit(s) SubCutaneous every 8 hours  hydrocortisone sodium succinate Injectable 50 milliGRAM(s) IV Push every 6 hours  levETIRAcetam  IVPB 500 milliGRAM(s) IV Intermittent every 12 hours  levothyroxine 125 MICROGram(s) Oral daily  midazolam Injectable 4 milliGRAM(s) IV Push every 4 hours PRN  mupirocin 2% Ointment 1 Application(s) Both Nostrils two times a day  norepinephrine Infusion 0.05 MICROgram(s)/kG/Min IV Continuous <Continuous>  pantoprazole  Injectable 40 milliGRAM(s) IV Push daily  polyethylene glycol 3350 17 Gram(s) Oral at bedtime  propofol Infusion 10 MICROgram(s)/kG/Min IV Continuous <Continuous>  risperiDONE   Tablet 2 milliGRAM(s) Oral daily  senna Syrup 10 milliLiter(s) Oral at bedtime  tamsulosin 0.4 milliGRAM(s) Oral at bedtime  valproic  acid Syrup 250 milliGRAM(s) Oral every 6 hours  zinc oxide 20% Ointment 1 Application(s) Topical two times a day      ALLERGIES:  No Known Allergies      FAMILY HISTORY:  No reported family history of epilepsy      SOCIAL HISTORY:  Skilled nursing facility resident  No reported alcohol, tobacco, or illicit drug use      REVIEW OF SYSTEMS: Limited due to encephalopathy  GENERAL: No fever  EYES: No eye discharge  EAR/NOSE/MOUTH/THROAT: No sinus discharge  NECK: No stiffness  RESPIRATORY: No cough  CARDIOVASCULAR: Recent bradycardia and hypotension  GASTROINTESTINAL: No nausea, vomiting, hematemesis  GENITOURINARY: No frequency, hematuria  SKIN: No rashes or lesions  ENDOCRINE: No reported heat or cold intolerance  HEMATOLOGIC: No reported easy bruising or bleeding  PSYCHIATRIC: H/o bipolar disorder  MUSCULOSKELETAL: No joint swelling  NEUROLOGICAL: As per HPI          OBJECTIVE:    Vital Signs Last 24 Hrs  T(C): 36.6 (04 Dec 2022 04:03), Max: 36.6 (04 Dec 2022 04:03)  T(F): 97.9 (04 Dec 2022 04:03), Max: 97.9 (04 Dec 2022 04:03)  HR: 49 (04 Dec 2022 05:45) (33 - 114)  BP: 116/37 (03 Dec 2022 10:15) (95/38 - 116/37)  BP(mean): 62 (03 Dec 2022 10:15) (55 - 90)  RR: 24 (04 Dec 2022 05:45) (18 - 30)  SpO2: 98% (04 Dec 2022 05:45) (94% - 100%)  Parameters below as of 03 Dec 2022 23:00  Patient On (Oxygen Delivery Method): ventilator  O2 Concentration (%): 40    General Exam:  General: Intubated, Sedated  Respiratory: Rapid rate, Diminished breath sounds b/l  Cardiovascular: Low heart rate, Regular rhythm, Weak b/l radial and pedal pulses    Neurological Exam:  General / Mental Status: Intubated, Sedated, Nonverbal, Does not follow commands.  Neurological exam is limited.  Cranial Nerves: Pupils are constricted at baseline with no further reaction to light.  Blink to threat absent b/l, Does not track finger movements with eyes b/l.  No response to pinprick at b/l V1-V3 nor to snap at b/l ears.  No apparent facial asymmetry.  Midline palate and tongue.  No resistance to passive motion of neck b/l.  Motor: Diminished bulk and tone in all four extremities.  All four extremities drop to bed after passive elevation.  Intermittent b/l arm jerking movements, more prominent on the right, are observed during the examination.  Sensation: No response to nailbed pressure in any of the four extremities.  Reflexes: 1+ and symmetric at b/l biceps, triceps, brachioradialis, patellae, and ankles.  Toes mute b/l.  Unable to assess coordination, Romberg sign, or gait in unresponsive patient.          LABORATORY VALUES:                        7.0    10.09 )-----------( 165      ( 04 Dec 2022 03:50 )             21.6       12-04    144  |  106  |  63<H>  ----------------------------<  128<H>  3.4<L>   |  28  |  3.00<H>    Ca    8.9      04 Dec 2022 03:50  Phos  4.1     12-04  Mg     2.2     12-04    TPro  6.1  /  Alb  1.8<L>  /  TBili  0.2  /  DBili  x   /  AST  20  /  ALT  15  /  AlkPhos  74  12-02    Valproic Acid Level, Serum: 31 ug/mL (12.02.22 @ 06:45)         NEUROIMAGING:      CT Head (12/1/22):  - No acute intracranial abnormality  - Chronic microvascular changes  - Mild diffuse atrophy  - Incidental inflammatory changes in sinuses      Routine EEG (12/3/22):  - Moderate generalized background slowing  - No seizure tendency identified          Please contact the Neurology consult service with any neurological questions.    Eric Rose MD   of Neurology  [facility] at [facility]

## 2022-12-03 NOTE — PROGRESS NOTE ADULT - SUBJECTIVE AND OBJECTIVE BOX
ICU VISIT  62y Male    Meds:  azithromycin  IVPB 500 milliGRAM(s) IV Intermittent every 24 hours  cefTRIAXone   IVPB 2000 milliGRAM(s) IV Intermittent every 24 hours    Allergies    No Known Allergies    Intolerances        VITALS:  Vital Signs Last 24 Hrs  T(C): 36.3 (03 Dec 2022 15:33), Max: 37.3 (03 Dec 2022 04:15)  T(F): 97.4 (03 Dec 2022 15:33), Max: 99.1 (03 Dec 2022 04:15)  HR: 53 (03 Dec 2022 16:06) (41 - 114)  BP: 116/37 (03 Dec 2022 10:15) (95/38 - 146/118)  BP(mean): 62 (03 Dec 2022 10:15) (55 - 128)  RR: 24 (03 Dec 2022 16:06) (21 - 30)  SpO2: 98% (03 Dec 2022 16:06) (94% - 100%)        LABS/DIAGNOSTIC TESTS:                          7.2    9.97  )-----------( 182      ( 03 Dec 2022 04:54 )             22.2         12-03    141  |  109<H>  |  93<H>  ----------------------------<  115<H>  3.9   |  23  |  4.32<H>    Ca    8.8      03 Dec 2022 04:54  Phos  4.6     12-03  Mg     2.4     12-03    TPro  6.1  /  Alb  1.8<L>  /  TBili  0.2  /  DBili  x   /  AST  20  /  ALT  15  /  AlkPhos  74  12-02      LIVER FUNCTIONS - ( 02 Dec 2022 19:09 )  Alb: 1.8 g/dL / Pro: 6.1 g/dL / ALK PHOS: 74 U/L / ALT: 15 U/L DA / AST: 20 U/L / GGT: x             CULTURES: ET Tube ET Tube  12-01 @ 17:40   Normal Respiratory Ivelisse present  --    Rare polymorphonuclear leukocytes per low power field  Rare Squamous epithelial cells per low power field  No organisms seen per oil power field      .Blood Blood  12-01 @ 00:15   No growth to date.  --  --      Clean Catch Clean Catch (Midstream)  12-01 @ 00:14   No growth  --  --            RADIOLOGY:      ROS:  [  ] UNABLE TO ELICIT ICU VISIT  62y Male who remains in the ICU , he remains intubated and vent dependent on an FIO2 of 40% and PEEP of 5, he is off pressors, he has no fevers or diarrhea and his WBC count is WNL.    Meds:  azithromycin  IVPB 500 milliGRAM(s) IV Intermittent every 24 hours  cefTRIAXone   IVPB 2000 milliGRAM(s) IV Intermittent every 24 hours    Allergies    No Known Allergies    Intolerances        VITALS:  Vital Signs Last 24 Hrs  T(C): 36.3 (03 Dec 2022 15:33), Max: 37.3 (03 Dec 2022 04:15)  T(F): 97.4 (03 Dec 2022 15:33), Max: 99.1 (03 Dec 2022 04:15)  HR: 53 (03 Dec 2022 16:06) (41 - 114)  BP: 116/37 (03 Dec 2022 10:15) (95/38 - 146/118)  BP(mean): 62 (03 Dec 2022 10:15) (55 - 128)  RR: 24 (03 Dec 2022 16:06) (21 - 30)  SpO2: 98% (03 Dec 2022 16:06) (94% - 100%)        LABS/DIAGNOSTIC TESTS:                          7.2    9.97  )-----------( 182      ( 03 Dec 2022 04:54 )             22.2         12-03    141  |  109<H>  |  93<H>  ----------------------------<  115<H>  3.9   |  23  |  4.32<H>    Ca    8.8      03 Dec 2022 04:54  Phos  4.6     12-03  Mg     2.4     12-03    TPro  6.1  /  Alb  1.8<L>  /  TBili  0.2  /  DBili  x   /  AST  20  /  ALT  15  /  AlkPhos  74  12-02      LIVER FUNCTIONS - ( 02 Dec 2022 19:09 )  Alb: 1.8 g/dL / Pro: 6.1 g/dL / ALK PHOS: 74 U/L / ALT: 15 U/L DA / AST: 20 U/L / GGT: x             CULTURES: ET Tube ET Tube  12-01 @ 17:40   Normal Respiratory Ivelisse present  --    Rare polymorphonuclear leukocytes per low power field  Rare Squamous epithelial cells per low power field  No organisms seen per oil power field      .Blood Blood  12-01 @ 00:15   No growth to date.  --  --      Clean Catch Clean Catch (Midstream)  12-01 @ 00:14   No growth  --  --            RADIOLOGY:      ROS:  [ x ] UNABLE TO ELICIT

## 2022-12-03 NOTE — PROGRESS NOTE ADULT - ASSESSMENT
1. SHEYLA due to ATN  Likely ATN from shock on CKD. Started on HD.  - HD again today.  -Adjust meds to eGFR and avoid IV Gadolinium contrast,NSAIDs, and phosphate enema.  -Monitor I/O's daily.   -Monitor SMA daily.    2. CKD stage 4 most likely due to ischemic nephropathy  -baseline scr around 3.7mg/dL in july 2022.  -Keep patient euvolemic and renal diet  -Avoid Nephrotoxic Meds/ Agents such as (NSAIDs, IV contrast, Aminoglycosides such as gentamicin, -Gadolinium contrast, Phosphate containing enemas, etc..)  -Adjust Medications according to eGFR.    3. Shock:  Likely septic shock.  Target MAP > 65 mm Hg.  - Care as per ICU    4. Mineral Bone Disease:  PTH is 400 and Ca is 8.8, corrected Ca is 11.8.  -phos is improving and f/u Vitamin 25 OH.  - Renvela tid once taking oral meds.  - Hold Calcitriol.  - Follow up Corrected Ca.    5. Mixed Acidosis:  Has AG and Respiratory Acidosis.  - Repeat ABG as he is at risk for Alkalosis now.    6. Bradycardia due to unclear etiology.  -improving heart rate.  -trend troponins  -TSH is nl.    7. Respiratory Failure.  -s/p intubated on 11/30 for worsening respiratory support.  -continue vent support  -plan as per MICU.     8. Anemia of ESRD:  -check iron panel and ferritin in am.  -order Epogen 8000units IV/SC TIW  -F/u CBC daily  -transfuse if HB < 7.0.      Patient is critically ill. Time Spent: 35mins.  Discussed the assessment and plan with ICU Team/Nurse

## 2022-12-03 NOTE — PROGRESS NOTE ADULT - ATTENDING COMMENTS
62 year old morbid obese male, from [facility], has past medical hx of ambulatory dysfunction, ACD, bipolar disorder, BPH, CKD, HLD, PVD, COPD not on oxygen, hypothyroidism, RBBB, lymphedema, CHF (last echo 2/22 normal EF, GIDD). Presented for hypotension 73/37 mmHg and bradycardia (40's). Patient admitted to ICU for shock and acute encephalopathy. Now intubated on mechanical ventilation.     DX:  - Shock - septic vs hypovolemia  - Bilateral lower cellulitis   - Acute encephalopathy   - Sinus bradycardia  - HAGMA   - SHEYLA on CKD  - Anemia   - COPD not on oxygen at home   - HLD   - BPH  - Hypothyroidism   - Mood disorder     Plan:  - Noted with myoclonic activity when off propofol  - Concern for possible seizures  - Obtain EEG   - Neurology consult  - Load with keppra  - Repeat CT head given new HD and concern for possible dialysis disequilibrium syndrome  - Monitor hemodynamics  - Vasopressor support to maintain MAP > 65  - Mechanical ventilation with lung protective strategy  - Adjust vent per ABG  - Cont. antibiotics  - CT scan showing basilar pneumonia   - ID consult appreciated  - Cultures negative thus far   - Monitor renal function  - Viera for urinary obstruction   - HD as per nephrology   - Tube feedings via NGT   - Cont. home psych medications  - Cont. Levothyroxine  - DVT and stress ulcer prophylaxis  - Cont. ICU care for now

## 2022-12-03 NOTE — PROGRESS NOTE ADULT - ASSESSMENT
Pneumonia - CAP  Resp failure  Septic shock - resolved  fevers - resolved  Leukocytosis - normalized  Pustular rash - resolved      Plan - Cont Rocephin 1 gm iv q24hrs  Cont Azithromycin 500mgs iv q24hrs  Time spent - 31 mins

## 2022-12-03 NOTE — CHART NOTE - NSCHARTNOTEFT_GEN_A_CORE
Right femoral shiley catheter dressing clean/dry. No palpable hematomas.   Continue dialysis per neph recs.

## 2022-12-03 NOTE — EEG REPORT - NS EEG TEXT BOX
[facility]  Com[facility]  Report of Routine EEG with Video    [facility]: [address], [place] [address], Phone [phone]  [place] Office: [address], [address], [place] [address] Phone [phone]    [facility]: [address] [place] [address], Phone [phone]  [place] Office: [address] [place] [address], Phone [phone]    Patient Name: GAYLA PEARCE    Age: 62 year  : 1960  Patient ID: -, MRN #: -, Location: ICU BED 6 D1-  Referring Physician: DR OSBORNE  EEG #: [id number]    Study Date: 12/3/2022     Technical Information:					  On Instrument: -  Placement and Labeling of Electrodes:  The EEG was performed utilizing 20 channels referential EEG connections (coronal over temporal over parasagittal montage) using all standard 10-20 electrode placements.  Recording was at a sampling rate of 256 samples per second per channel.  Time synchronized digital video recording was done simultaneously with EEG recording.  A low light infrared camera was used for low light recording.  Costa and seizure detection algorithms were utilized.    ROUTINE EEG PERFORMED AT BEDSIDE  61 Y/O MALE  H/O : MORBID OBESE MALE, AMBULATORY DYSFUNCTION ACXD, BIPOLAR DISORDER  BPH, CKD, PVD, COPD, NOT ON OXYGEN, ANEMIA, HYPOTHYROIDISMRBBB, LYMPHEDEMA, CHF (LAST ECHO 2022 NORMAL EF GIDD ) , WAS BIBEMS FOR HYPOTENSION ( 73/37 MMHG) AND BRADYCARDIA 40'S  P/W : R/O SEIZURE AMS / RE TWITCHING    Pertinent Medication  No Data.    Study Interpretation:  Findings: The background was continuous and reactive.   No posterior dominant rhythm seen.  Diffuse theta and polymorphic delta slowing.    Focal Slowing:   None were present.    Sleep Background:  Stage II sleep transients were not recorded.    Other Non-Epileptiform Findings:  None were present.    Interictal Epileptiform Activity:   None were present.    Events:  No event or seizure recorded.    Activation Procedures:   Hyperventilation was not performed.    Photic stimulation was performed and did not elicit any abnormalities.      Artifacts:  Intermittent myogenic and movement artifacts were noted.    EEG Summary / Classification:  Abnormal EEG   - Moderate generalized slowing.    EEG Impression / Clinical Correlate:  Abnormal EEG study.  Moderate nonspecific diffuse or multifocal cerebral dysfunction.   No epileptiform pattern or seizure seen.    ________________________________________    Feliciano Esquivel MD  Attending Physician, [facility]

## 2022-12-03 NOTE — PROGRESS NOTE ADULT - SUBJECTIVE AND OBJECTIVE BOX
Blake Olivier MD  [phone].    Covering for Dr Valenzuela.    GAYLA PEARCE  62y  Patient is a 62y old  Male who presents with a chief complaint of Shock, Acute Encephalopathy (03 Dec 2022 08:30)    HPI:  This is a patient admitted for septic shock and developed SHEYLA on CKD. HD initiated yesterday via temporary HD non-tunnelled catheter in femoral vein.  HD again today. He remains sedated on pressors.    HEALTH ISSUES - PROBLEM Dx:        MEDICATIONS  (STANDING):  albuterol    90 MICROgram(s) HFA Inhaler 2 Puff(s) Inhalation every 6 hours  azithromycin  IVPB 500 milliGRAM(s) IV Intermittent every 24 hours  cefTRIAXone   IVPB 2000 milliGRAM(s) IV Intermittent every 24 hours  chlorhexidine 0.12% Liquid 15 milliLiter(s) Oral Mucosa every 12 hours  chlorhexidine 2% Cloths 1 Application(s) Topical <User Schedule>  collagenase Ointment 1 Application(s) Topical two times a day  epoetin madyson-epbx (RETACRIT) Injectable 8000 Unit(s) IV Push <User Schedule>  fentaNYL   Infusion. 1 MICROgram(s)/kG/Hr (11.4 mL/Hr) IV Continuous <Continuous>  finasteride 5 milliGRAM(s) Oral daily  heparin   Injectable 5000 Unit(s) SubCutaneous every 8 hours  hydrocortisone sodium succinate Injectable 50 milliGRAM(s) IV Push every 6 hours  levothyroxine 125 MICROGram(s) Oral daily  mupirocin 2% Ointment 1 Application(s) Both Nostrils two times a day  norepinephrine Infusion 0.05 MICROgram(s)/kG/Min (5.34 mL/Hr) IV Continuous <Continuous>  pantoprazole  Injectable 40 milliGRAM(s) IV Push daily  polyethylene glycol 3350 17 Gram(s) Oral at bedtime  propofol Infusion 10 MICROgram(s)/kG/Min (6.83 mL/Hr) IV Continuous <Continuous>  risperiDONE   Tablet 2 milliGRAM(s) Oral daily  senna Syrup 10 milliLiter(s) Oral at bedtime  tamsulosin 0.4 milliGRAM(s) Oral at bedtime  valproic  acid Syrup 250 milliGRAM(s) Oral every 6 hours  zinc oxide 20% Ointment 1 Application(s) Topical two times a day    MEDICATIONS  (PRN):  midazolam Injectable 4 milliGRAM(s) IV Push every 4 hours PRN seizure or muscle spasms    Vital Signs Last 24 Hrs  T(C): 37.3 (03 Dec 2022 04:15), Max: 37.3 (03 Dec 2022 04:15)  T(F): 99.1 (03 Dec 2022 04:15), Max: 99.1 (03 Dec 2022 04:15)  HR: 55 (03 Dec 2022 14:15) (41 - 114)  BP: 116/37 (03 Dec 2022 10:15) (95/38 - 146/118)  BP(mean): 62 (03 Dec 2022 10:15) (55 - 128)  RR: 30 (03 Dec 2022 14:15) (17 - 30)  SpO2: 100% (03 Dec 2022 14:15) (94% - 100%)    Parameters below as of 02 Dec 2022 15:55  Patient On (Oxygen Delivery Method): ventilator      Daily     Daily Weight in k.9 (03 Dec 2022 07:00)    PHYSICAL EXAM:  Constitutional:  He appears comfortable and not distressed. Not diaphoretic. Obese.    Neck:  The thyroid is normal. Trachea is midline.     Breasts: No gynecomastia.      Respiratory: The lungs are clear to auscultation. No dullness and expansion is normal.    Cardiovascular: S1 and S2 are normal. No mummurs, rubs or gallops are present.    Gastrointestinal: The abdomen is soft. No tenderness is present. No masses are present.     Genitourinary: The bladder is not distended. No CVA tenderness is present.    Extremities: No edema is noted. Cellulitis of both lower legs.    Neurological: Sedated.    Skin: Leg cellulitis, possible chronic venous stasis.    Lymph Nodes: No lymphadenopathy is present.    I&O's Summary    02 Dec 2022 07:01  -  03 Dec 2022 07:00  --------------------------------------------------------  IN: 2989.5 mL / OUT: 2610 mL / NET: 379.5 mL    03 Dec 2022 07:01  -  03 Dec 2022 15:28  --------------------------------------------------------  IN: 751 mL / OUT: 1310 mL / NET: -559 mL                            7.2    9.97  )-----------( 182      ( 03 Dec 2022 04:54 )             22.2     12-    141  |  109<H>  |  93<H>  ----------------------------<  115<H>  3.9   |  23  |  4.32<H>    Ca    8.8      03 Dec 2022 04:54  Phos  4.6       Mg     2.4         TPro  6.1  /  Alb  1.8<L>  /  TBili  0.2  /  DBili  x   /  AST  20  /  ALT  15  /  AlkPhos  74  12    Parathyroid Hormone Intact, Serum (22 @ 04:54)   Calcium.: 8.4 mg/dL   Intact PTH: 402  Creatine Kinase, Serum: 185 U/L (22 @ 06:45)   Creatine Kinase, Serum: 225 U/L (22 @ 00:14)

## 2022-12-03 NOTE — PHARMACOTHERAPY INTERVENTION NOTE - NSPHARMCOMMASP
ASP - Lab/ test recommended
ASP - Dose optimization/Non-Renal dose adjustment
ASP - Renal dose adjustment

## 2022-12-03 NOTE — PROGRESS NOTE ADULT - SUBJECTIVE AND OBJECTIVE BOX
INTERVAL HPI/OVERNIGHT EVENTS:  Seen and examined at bedside. Intubated. Sedated on fentanyl. Added propofol due to recurrent muscle spasms    PRESSORS: [ X] YES [ ] NO  WHICH: Levophed    Antimicrobial:  azithromycin  IVPB 500 milliGRAM(s) IV Intermittent every 24 hours  cefTRIAXone   IVPB 1000 milliGRAM(s) IV Intermittent every 24 hours    Cardiovascular:  norepinephrine Infusion 0.05 MICROgram(s)/kG/Min IV Continuous <Continuous>    Pulmonary:  albuterol    90 MICROgram(s) HFA Inhaler 2 Puff(s) Inhalation every 6 hours    Hematalogic:  heparin   Injectable 5000 Unit(s) SubCutaneous every 8 hours    Other:  buPROPion . 75 milliGRAM(s) Oral two times a day  chlorhexidine 0.12% Liquid 15 milliLiter(s) Oral Mucosa every 12 hours  chlorhexidine 2% Cloths 1 Application(s) Topical <User Schedule>  collagenase Ointment 1 Application(s) Topical two times a day  dexMEDEtomidine Infusion 0.3 MICROgram(s)/kG/Hr IV Continuous <Continuous>  epoetin madyson-epbx (RETACRIT) Injectable 8000 Unit(s) IV Push <User Schedule>  fentaNYL   Infusion. 1 MICROgram(s)/kG/Hr IV Continuous <Continuous>  finasteride 5 milliGRAM(s) Oral daily  hydrocortisone sodium succinate Injectable 50 milliGRAM(s) IV Push every 6 hours  lactated ringers. 1000 milliLiter(s) IV Continuous <Continuous>  levothyroxine Injectable 94 MICROGram(s) IV Push at bedtime  midazolam Injectable 4 milliGRAM(s) IV Push every 4 hours PRN  mupirocin 2% Ointment 1 Application(s) Both Nostrils two times a day  pantoprazole  Injectable 40 milliGRAM(s) IV Push daily  polyethylene glycol 3350 17 Gram(s) Oral at bedtime  propofol Infusion 10 MICROgram(s)/kG/Min IV Continuous <Continuous>  risperiDONE   Tablet 2 milliGRAM(s) Oral daily  senna Syrup 10 milliLiter(s) Oral at bedtime  sodium bicarbonate 325 milliGRAM(s) Oral every 6 hours  tamsulosin 0.4 milliGRAM(s) Oral at bedtime  valproic  acid Syrup 250 milliGRAM(s) Oral every 6 hours  zinc oxide 20% Ointment 1 Application(s) Topical two times a day    albuterol    90 MICROgram(s) HFA Inhaler 2 Puff(s) Inhalation every 6 hours  azithromycin  IVPB 500 milliGRAM(s) IV Intermittent every 24 hours  buPROPion . 75 milliGRAM(s) Oral two times a day  cefTRIAXone   IVPB 1000 milliGRAM(s) IV Intermittent every 24 hours  chlorhexidine 0.12% Liquid 15 milliLiter(s) Oral Mucosa every 12 hours  chlorhexidine 2% Cloths 1 Application(s) Topical <User Schedule>  collagenase Ointment 1 Application(s) Topical two times a day  dexMEDEtomidine Infusion 0.3 MICROgram(s)/kG/Hr IV Continuous <Continuous>  epoetin madyson-epbx (RETACRIT) Injectable 8000 Unit(s) IV Push <User Schedule>  fentaNYL   Infusion. 1 MICROgram(s)/kG/Hr IV Continuous <Continuous>  finasteride 5 milliGRAM(s) Oral daily  heparin   Injectable 5000 Unit(s) SubCutaneous every 8 hours  hydrocortisone sodium succinate Injectable 50 milliGRAM(s) IV Push every 6 hours  lactated ringers. 1000 milliLiter(s) IV Continuous <Continuous>  levothyroxine Injectable 94 MICROGram(s) IV Push at bedtime  midazolam Injectable 4 milliGRAM(s) IV Push every 4 hours PRN  mupirocin 2% Ointment 1 Application(s) Both Nostrils two times a day  norepinephrine Infusion 0.05 MICROgram(s)/kG/Min IV Continuous <Continuous>  pantoprazole  Injectable 40 milliGRAM(s) IV Push daily  polyethylene glycol 3350 17 Gram(s) Oral at bedtime  propofol Infusion 10 MICROgram(s)/kG/Min IV Continuous <Continuous>  risperiDONE   Tablet 2 milliGRAM(s) Oral daily  senna Syrup 10 milliLiter(s) Oral at bedtime  sodium bicarbonate 325 milliGRAM(s) Oral every 6 hours  tamsulosin 0.4 milliGRAM(s) Oral at bedtime  valproic  acid Syrup 250 milliGRAM(s) Oral every 6 hours  zinc oxide 20% Ointment 1 Application(s) Topical two times a day    Drug Dosing Weight  Height (cm): 177.8 (30 Nov 2022 18:45)  Weight (kg): 113.9 (30 Nov 2022 18:45)  BMI (kg/m2): 36 (30 Nov 2022 18:45)  BSA (m2): 2.3 (30 Nov 2022 18:45)    CENTRAL LINE: [ ] YES [ ] NO  LOCATION:   DATE INSERTED:  REMOVE: [ ] YES [ ] NO  EXPLAIN:    HATFIELD: [ ] YES [ ] NO    DATE INSERTED:  REMOVE:  [ ] YES [ ] NO  EXPLAIN:    A-LINE:  [ ] YES [ ] NO  LOCATION:   DATE INSERTED:  REMOVE:  [ ] YES [ ] NO  EXPLAIN:    PMH -reviewed admission note, no change since admission  PAST MEDICAL & SURGICAL HISTORY:  Hypothyroid      Hyperlipidemia      Ambulatory dysfunction      Anemia      History of BPH      CKD (chronic kidney disease)      Chronic obstructive pulmonary disease (COPD)      Bipolar disorder      HLD (hyperlipidemia)      BPH (benign prostatic hyperplasia)      Chronic diastolic congestive heart failure      Lymphedema      No significant past surgical history          ICU Vital Signs Last 24 Hrs  T(C): 35.9 (02 Dec 2022 18:30), Max: 37.3 (02 Dec 2022 06:00)  T(F): 96.6 (02 Dec 2022 18:30), Max: 99.1 (02 Dec 2022 06:00)  HR: 55 (03 Dec 2022 03:00) (38 - 108)  BP: 146/118 (03 Dec 2022 02:00) (111/43 - 146/118)  BP(mean): 128 (03 Dec 2022 02:00) (60 - 128)  ABP: 116/50 (03 Dec 2022 03:00) (110/39 - 193/133)  ABP(mean): 69 (03 Dec 2022 03:00) (59 - 156)  RR: 30 (03 Dec 2022 03:00) (17 - 41)  SpO2: 100% (03 Dec 2022 03:00) (91% - 100%)    O2 Parameters below as of 02 Dec 2022 15:55  Patient On (Oxygen Delivery Method): ventilator            ABG - ( 02 Dec 2022 18:37 )  pH, Arterial: 7.26  pH, Blood: x     /  pCO2: 46    /  pO2: 112   / HCO3: 21    / Base Excess: -6.1  /  SaO2: 99                    12-01 @ 07:01  -  12-02 @ 07:00  --------------------------------------------------------  IN: 3042.5 mL / OUT: 2000 mL / NET: 1042.5 mL        Mode: AC/ CMV (Assist Control/ Continuous Mandatory Ventilation)  RR (machine): 30  TV (machine): 450  FiO2: 40  PEEP: 5  ITime: 0.9  MAP: 10  PIP: 24      PHYSICAL EXAM:  GENERAL: Ventilated, Sedated  HEAD:  Atraumatic, Normocephalic  EYES: 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, no scleral icterus  NECK: Supple, normal appearance  NERVOUS SYSTEM: Sedated, responds to noxious stimuli  CHEST/LUNG: Lungs clear to auscultation bilaterally, No rales, rhonchi, wheezing   HEART: Regular rate and rhythm; No murmurs, rubs, or gallops  ABDOMEN: Soft, Nontender, Nondistended; Bowel sounds present  EXTREMITIES,:  severe scaling, discoloring  LYMPH: No lymphadenopathy noted  SKIN: Sacral ulcer, Right posterior thigh      LABS:  CBC Full  -  ( 02 Dec 2022 06:45 )  WBC Count : 12.76 K/uL  RBC Count : 2.62 M/uL  Hemoglobin : 7.9 g/dL  Hematocrit : 24.4 %  Platelet Count - Automated : 250 K/uL  Mean Cell Volume : 93.1 fl  Mean Cell Hemoglobin : 30.2 pg  Mean Cell Hemoglobin Concentration : 32.4 gm/dL  Auto Neutrophil # : x  Auto Lymphocyte # : x  Auto Monocyte # : x  Auto Eosinophil # : x  Auto Basophil # : x  Auto Neutrophil % : x  Auto Lymphocyte % : x  Auto Monocyte % : x  Auto Eosinophil % : x  Auto Basophil % : x    12-02    141  |  109<H>  |  89<H>  ----------------------------<  131<H>  3.6   |  21<L>  |  4.24<H>    Ca    8.5      02 Dec 2022 19:09  Phos  5.6     12-02  Mg     2.5     12-02    TPro  6.1  /  Alb  1.8<L>  /  TBili  0.2  /  DBili  x   /  AST  20  /  ALT  15  /  AlkPhos  74  12-02        Culture Results:   Normal Respiratory Ivelisse present (12-01 @ 17:40)  Culture Results:   No growth to date. (12-01 @ 00:15)  Culture Results:   No growth to date. (12-01 @ 00:15)  Culture Results:   No growth (12-01 @ 00:14)      RADIOLOGY & ADDITIONAL STUDIES REVIEWED:  ***    [ ]GOALS OF CARE DISCUSSION WITH PATIENT/FAMILY/PROXY:    CRITICAL CARE TIME SPENT: 35 minutes INTERVAL HPI/OVERNIGHT EVENTS:  Seen and examined at bedside. Intubated. Sedated on fentanyl. Added propofol due to recurrent muscle spasms    PRESSORS: [ X] YES [ ] NO  WHICH: Levophed    Antimicrobial:  azithromycin  IVPB 500 milliGRAM(s) IV Intermittent every 24 hours  cefTRIAXone   IVPB 1000 milliGRAM(s) IV Intermittent every 24 hours    Cardiovascular:  norepinephrine Infusion 0.05 MICROgram(s)/kG/Min IV Continuous <Continuous>    Pulmonary:  albuterol    90 MICROgram(s) HFA Inhaler 2 Puff(s) Inhalation every 6 hours    Hematalogic:  heparin   Injectable 5000 Unit(s) SubCutaneous every 8 hours    Other:  buPROPion . 75 milliGRAM(s) Oral two times a day  chlorhexidine 0.12% Liquid 15 milliLiter(s) Oral Mucosa every 12 hours  chlorhexidine 2% Cloths 1 Application(s) Topical <User Schedule>  collagenase Ointment 1 Application(s) Topical two times a day  dexMEDEtomidine Infusion 0.3 MICROgram(s)/kG/Hr IV Continuous <Continuous>  epoetin madyson-epbx (RETACRIT) Injectable 8000 Unit(s) IV Push <User Schedule>  fentaNYL   Infusion. 1 MICROgram(s)/kG/Hr IV Continuous <Continuous>  finasteride 5 milliGRAM(s) Oral daily  hydrocortisone sodium succinate Injectable 50 milliGRAM(s) IV Push every 6 hours  lactated ringers. 1000 milliLiter(s) IV Continuous <Continuous>  levothyroxine Injectable 94 MICROGram(s) IV Push at bedtime  midazolam Injectable 4 milliGRAM(s) IV Push every 4 hours PRN  mupirocin 2% Ointment 1 Application(s) Both Nostrils two times a day  pantoprazole  Injectable 40 milliGRAM(s) IV Push daily  polyethylene glycol 3350 17 Gram(s) Oral at bedtime  propofol Infusion 10 MICROgram(s)/kG/Min IV Continuous <Continuous>  risperiDONE   Tablet 2 milliGRAM(s) Oral daily  senna Syrup 10 milliLiter(s) Oral at bedtime  sodium bicarbonate 325 milliGRAM(s) Oral every 6 hours  tamsulosin 0.4 milliGRAM(s) Oral at bedtime  valproic  acid Syrup 250 milliGRAM(s) Oral every 6 hours  zinc oxide 20% Ointment 1 Application(s) Topical two times a day    albuterol    90 MICROgram(s) HFA Inhaler 2 Puff(s) Inhalation every 6 hours  azithromycin  IVPB 500 milliGRAM(s) IV Intermittent every 24 hours  buPROPion . 75 milliGRAM(s) Oral two times a day  cefTRIAXone   IVPB 1000 milliGRAM(s) IV Intermittent every 24 hours  chlorhexidine 0.12% Liquid 15 milliLiter(s) Oral Mucosa every 12 hours  chlorhexidine 2% Cloths 1 Application(s) Topical <User Schedule>  collagenase Ointment 1 Application(s) Topical two times a day  dexMEDEtomidine Infusion 0.3 MICROgram(s)/kG/Hr IV Continuous <Continuous>  epoetin madyson-epbx (RETACRIT) Injectable 8000 Unit(s) IV Push <User Schedule>  fentaNYL   Infusion. 1 MICROgram(s)/kG/Hr IV Continuous <Continuous>  finasteride 5 milliGRAM(s) Oral daily  heparin   Injectable 5000 Unit(s) SubCutaneous every 8 hours  hydrocortisone sodium succinate Injectable 50 milliGRAM(s) IV Push every 6 hours  lactated ringers. 1000 milliLiter(s) IV Continuous <Continuous>  levothyroxine Injectable 94 MICROGram(s) IV Push at bedtime  midazolam Injectable 4 milliGRAM(s) IV Push every 4 hours PRN  mupirocin 2% Ointment 1 Application(s) Both Nostrils two times a day  norepinephrine Infusion 0.05 MICROgram(s)/kG/Min IV Continuous <Continuous>  pantoprazole  Injectable 40 milliGRAM(s) IV Push daily  polyethylene glycol 3350 17 Gram(s) Oral at bedtime  propofol Infusion 10 MICROgram(s)/kG/Min IV Continuous <Continuous>  risperiDONE   Tablet 2 milliGRAM(s) Oral daily  senna Syrup 10 milliLiter(s) Oral at bedtime  sodium bicarbonate 325 milliGRAM(s) Oral every 6 hours  tamsulosin 0.4 milliGRAM(s) Oral at bedtime  valproic  acid Syrup 250 milliGRAM(s) Oral every 6 hours  zinc oxide 20% Ointment 1 Application(s) Topical two times a day    Drug Dosing Weight  Height (cm): 177.8 (30 Nov 2022 18:45)  Weight (kg): 113.9 (30 Nov 2022 18:45)  BMI (kg/m2): 36 (30 Nov 2022 18:45)  BSA (m2): 2.3 (30 Nov 2022 18:45)    CENTRAL LINE: [X] YES [ ] NO  LOCATION:  [facility] DATE INSERTED: 11/30    HATFIELD: [X] YES [ ] NO    DATE INSERTED: 11/30    A-LINE:  [X ] YES [ ] NO  LOCATION: [facility]  DATE INSERTED: 11/30      PMH -reviewed admission note, no change since admission  PAST MEDICAL & SURGICAL HISTORY:  Hypothyroid      Hyperlipidemia      Ambulatory dysfunction      Anemia      History of BPH      CKD (chronic kidney disease)      Chronic obstructive pulmonary disease (COPD)      Bipolar disorder      HLD (hyperlipidemia)      BPH (benign prostatic hyperplasia)      Chronic diastolic congestive heart failure      Lymphedema      No significant past surgical history          ICU Vital Signs Last 24 Hrs  T(C): 35.9 (02 Dec 2022 18:30), Max: 37.3 (02 Dec 2022 06:00)  T(F): 96.6 (02 Dec 2022 18:30), Max: 99.1 (02 Dec 2022 06:00)  HR: 55 (03 Dec 2022 03:00) (38 - 108)  BP: 146/118 (03 Dec 2022 02:00) (111/43 - 146/118)  BP(mean): 128 (03 Dec 2022 02:00) (60 - 128)  ABP: 116/50 (03 Dec 2022 03:00) (110/39 - 193/133)  ABP(mean): 69 (03 Dec 2022 03:00) (59 - 156)  RR: 30 (03 Dec 2022 03:00) (17 - 41)  SpO2: 100% (03 Dec 2022 03:00) (91% - 100%)    O2 Parameters below as of 02 Dec 2022 15:55  Patient On (Oxygen Delivery Method): ventilator            ABG - ( 02 Dec 2022 18:37 )  pH, Arterial: 7.26  pH, Blood: x     /  pCO2: 46    /  pO2: 112   / HCO3: 21    / Base Excess: -6.1  /  SaO2: 99                    12-01 @ 07:01  -  12-02 @ 07:00  --------------------------------------------------------  IN: 3042.5 mL / OUT: 2000 mL / NET: 1042.5 mL        Mode: AC/ CMV (Assist Control/ Continuous Mandatory Ventilation)  RR (machine): 30  TV (machine): 450  FiO2: 40  PEEP: 5  ITime: 0.9  MAP: 10  PIP: 24      PHYSICAL EXAM:  GENERAL: Ventilated, Sedated  HEAD:  Atraumatic, Normocephalic  EYES: 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, no scleral icterus  NECK: Supple, normal appearance  NERVOUS SYSTEM: Sedated, responds to noxious stimuli  CHEST/LUNG: Lungs clear to auscultation bilaterally, No rales, rhonchi, wheezing   HEART: Regular rate and rhythm; No murmurs, rubs, or gallops  ABDOMEN: Soft, Nontender, Nondistended; Bowel sounds present  EXTREMITIES,:  severe scaling, discoloring  LYMPH: No lymphadenopathy noted  SKIN: Sacral ulcer, Right posterior thigh      LABS:  CBC Full  -  ( 02 Dec 2022 06:45 )  WBC Count : 12.76 K/uL  RBC Count : 2.62 M/uL  Hemoglobin : 7.9 g/dL  Hematocrit : 24.4 %  Platelet Count - Automated : 250 K/uL  Mean Cell Volume : 93.1 fl  Mean Cell Hemoglobin : 30.2 pg  Mean Cell Hemoglobin Concentration : 32.4 gm/dL  Auto Neutrophil # : x  Auto Lymphocyte # : x  Auto Monocyte # : x  Auto Eosinophil # : x  Auto Basophil # : x  Auto Neutrophil % : x  Auto Lymphocyte % : x  Auto Monocyte % : x  Auto Eosinophil % : x  Auto Basophil % : x    12-02    141  |  109<H>  |  89<H>  ----------------------------<  131<H>  3.6   |  21<L>  |  4.24<H>    Ca    8.5      02 Dec 2022 19:09  Phos  5.6     12-02  Mg     2.5     12-02    TPro  6.1  /  Alb  1.8<L>  /  TBili  0.2  /  DBili  x   /  AST  20  /  ALT  15  /  AlkPhos  74  12-02        Culture Results:   Normal Respiratory Ivelisse present (12-01 @ 17:40)  Culture Results:   No growth to date. (12-01 @ 00:15)  Culture Results:   No growth to date. (12-01 @ 00:15)  Culture Results:   No growth (12-01 @ 00:14)      RADIOLOGY & ADDITIONAL STUDIES REVIEWED:  ***    [ ]GOALS OF CARE DISCUSSION WITH PATIENT/FAMILY/PROXY:    CRITICAL CARE TIME SPENT: 35 minutes

## 2022-12-04 NOTE — PROGRESS NOTE ADULT - ASSESSMENT
62 year old morbid obese male, from [facility], has past medical hx of ambulatory dysfunction, ACD, bipolar disorder, BPH, CKD, HLD, PVD, COPD not on oxygen, hypothyroidism, RBBB, lymphedema, CHF (last echo 2/22 normal EF, GIDD) was BIBEMS for hypotension 73/37 mmHg and bradycardia (40's). Patient admitted to ICU for shock and acute encephalopathy.     DX:  - Shock septic vs hypovolemia  - LE cellulitis   - Acute encephalopathy   - Sinus bradycardia  - HAGMA   - SHEYLA on CKD  - Anemia   - COPD not on oxygen at home   - HLD   - BPH  - Hypothyroidism   - Mood disorder       =================== Neuro============================  #Acute Encephalopathy; likely in the setting of acidemia, infection, uremia    Lethargic, fighting vent    Pain meds with Tylenol   On Precedex for Sedation/Pain  CT Head negative for acute bleed/swelling    #Seizure  - Home med includes valproic acid  - Patient is having repeated muscle spasms, improved on Versed but not fully stopped  - Still having muscle movements after dialysis which brought down BUN  - Started propofol instead of precedex  - Valproic acid level low, likely due to missed doses when patient was originally admitted.  - [ ] EEG    ================= Cardiovascular==========================  Sinus bradycardia  - EKG sinus bradycardia, old RBBB  - F/u echo, troponin; downtrending, normal EF  - Monitor for now   - Will give atropine if needed, pads at bedside   - Given normal EF, no changes in EKG, cardiogenic shock less likely    Hypotension  - On Levophed, Dopamine  - IV Fluids 75cc/hr    ================- Pulm=================================  COPD  - Ventilated on 20/450/60%/8  - Patient with hx of COPD not in exacerbation   - Chest X ray Showed bilateral patchy infiltrates, no concern for fluid overload    - Oxygen supplementation as needed to maintain SpO2 of 88-92%  - C/w home meds       ==================ID===================================  Shock  - Concern for sepsis vs hypovolemic   - Empirical treatment with Cefepime for LE cellulitis and for pseudomonal coverage  - S/p 4L NS   - Started on pressors   F/u lactate 1.4, procalcitonin, Bcx, Ucx, CK NGTD  - ID consulted Dr Nolasco     ================= Nephro================================  SHEYLA on CKD  - On admission, patient with Cr 6.4>>5.6, baseline 3.7  - Worsening CKD most likely a combination of obstructive vs pre- renal   - Viera catheter placed in ED with 1000 cc of UO  - -2200L in the past 24 hours  - Avoid nephrotoxins, NSAIDS, ACEI and ARBS  - Monitor BMP daily  - Nephro Dr Valenzuela consulted  - S/p Dialysis 12/2  [ ] Dialysis 12/3  [ ]Establish Line access via vascular    HAGMA  - Most likely from uremia   - ABG 7.2/44/97/17 >>7.34/34/102/18  - Resume Bicarb 325 Q6 hours  - Nephro Dr Valenzuela consulted    =================GI====================================  NGT in place  Start tube feeds  Resume psych PO medications- Wellbutrin  [ ] Bowel regimen, stool impaction    ================ Heme==================================  Anemia   - Anemia of chronic disease   - Monitor CBC daily   - Hgb Stable no signs of active bleed    =================Endocrine===============================  Hypothyroidism  - F/u TSH Normal   - C/w home meds when not NPO     ================= Skin/Catheters============================  Peripheral IV lines   Viera catheter   A line and CVC   Sacral and Posterior Thigh Ulcers, Wound Care Consulted  [ ] Pending dialysis access    =================Prophylaxis =============================  DVT prophylaxis with Heparin SQ daily   GI prophylaxis with PPI daily    ==================GOC==================================  FULL CODE

## 2022-12-04 NOTE — PROCEDURE NOTE - NSPROCNAME_GEN_A_CORE
Arterial Puncture/Cannulation
Central Line Insertion
Central Line Insertion
Peripheral Line Insertion

## 2022-12-04 NOTE — CHART NOTE - NSCHARTNOTEFT_GEN_A_CORE
Patient seen and examined at bedside for removal of a radial arterial line. Chart and labs reviewed prior to removal, no contraindication to procedure. Area cleaned with Alcohol swabs and suture removal kit used to remove sutures holding arterial line in place. Pressure applied for 10 minutes with gauze. No signs of active bleeding noted. No hematoma formation noted. Tegaderm and gauze/tape used to create pressure dressing to maintain pressure on arterial line site. Patient tolerated well without complications. Discussed procedure with RN who will monitor and notify the provider for bleeding, hematoma formation, or other procedural complications.

## 2022-12-04 NOTE — PROGRESS NOTE ADULT - SUBJECTIVE AND OBJECTIVE BOX
INTERVAL HPI/OVERNIGHT EVENTS:   Albaro to 20s    PRESSORS: [ X] YES [ ] NO  WHICH: levo     ANTIBIOTICS:         azithro          DATE STARTED:  12/1/2022  ANTIBIOTICS:      ceftriaxone             DATE STARTED:  12/1/2022      Antimicrobial:  azithromycin  IVPB 500 milliGRAM(s) IV Intermittent every 24 hours  cefTRIAXone   IVPB 2000 milliGRAM(s) IV Intermittent every 24 hours    Cardiovascular:  norepinephrine Infusion 0.05 MICROgram(s)/kG/Min IV Continuous <Continuous>    Pulmonary:  albuterol    90 MICROgram(s) HFA Inhaler 2 Puff(s) Inhalation every 6 hours    Hematalogic:  heparin   Injectable 5000 Unit(s) SubCutaneous every 8 hours    Other:  chlorhexidine 0.12% Liquid 15 milliLiter(s) Oral Mucosa every 12 hours  chlorhexidine 2% Cloths 1 Application(s) Topical <User Schedule>  collagenase Ointment 1 Application(s) Topical two times a day  epoetin madyson-epbx (RETACRIT) Injectable 8000 Unit(s) IV Push <User Schedule>  fentaNYL   Infusion. 1 MICROgram(s)/kG/Hr IV Continuous <Continuous>  finasteride 5 milliGRAM(s) Oral daily  hydrocortisone sodium succinate Injectable 50 milliGRAM(s) IV Push every 6 hours  levETIRAcetam  IVPB 500 milliGRAM(s) IV Intermittent every 12 hours  levothyroxine 125 MICROGram(s) Oral daily  midazolam Injectable 4 milliGRAM(s) IV Push every 4 hours PRN  mupirocin 2% Ointment 1 Application(s) Both Nostrils two times a day  pantoprazole  Injectable 40 milliGRAM(s) IV Push daily  polyethylene glycol 3350 17 Gram(s) Oral at bedtime  propofol Infusion 10 MICROgram(s)/kG/Min IV Continuous <Continuous>  risperiDONE   Tablet 2 milliGRAM(s) Oral daily  senna Syrup 10 milliLiter(s) Oral at bedtime  tamsulosin 0.4 milliGRAM(s) Oral at bedtime  valproic  acid Syrup 250 milliGRAM(s) Oral every 6 hours  zinc oxide 20% Ointment 1 Application(s) Topical two times a day      Drug Dosing Weight  Height (cm): 177.8 (30 Nov 2022 18:45)  Weight (kg): 113.9 (30 Nov 2022 18:45)  BMI (kg/m2): 36 (30 Nov 2022 18:45)  BSA (m2): 2.3 (30 Nov 2022 18:45)    CENTRAL LINE: [X ] YES  LOCATION:  [facility]  DATE INSERTED:  11/30  REMOVE: [ ] YES [ ] NO  EXPLAIN:    HATFIELD: [ X] YES [ ] NO    DATE INSERTED:  11/30      A-LINE:  [X ] YES [ ] NO  LOCATION: left radial   DATE INSERTED: 11/30      PMH/Social Hx/Fam Hx -reviewed admission note, no change since admission  PAST MEDICAL & SURGICAL HISTORY:  Hypothyroid      Hyperlipidemia      Ambulatory dysfunction      Anemia      History of BPH      CKD (chronic kidney disease)      Chronic obstructive pulmonary disease (COPD)      Bipolar disorder      HLD (hyperlipidemia)      BPH (benign prostatic hyperplasia)      Chronic diastolic congestive heart failure      Lymphedema      No significant past surgical history        Heart faliure: acute [ ] chronic [ ] acute or chronic [ ] diastolic [ ] systolic [ ] combied systolic and diastolic[ ]  SHEYLA: ATN[ ] renal medullary necrosis [ ] CKD I [ ]CKDII [ ]CKD III [ ]CKD IV [ ]CKD V [ ]Other pathological lesions [ ]  Abdominal Nutrition Status: malnutrition [ ] cachexia [ ] morbid obesity/BMI=40 [ ] Supplement ordered [___________]     T(C): 36.3 (12-03-22 @ 19:28), Max: 37.3 (12-03-22 @ 04:15)  HR: 40 (12-04-22 @ 03:00)  BP: 116/37 (12-03-22 @ 10:15)  BP(mean): 62 (12-03-22 @ 10:15)  ABP: 116/53 (12-04-22 @ 03:00)  ABP(mean): 75 (12-04-22 @ 03:00)  RR: 24 (12-04-22 @ 03:00)  SpO2: 100% (12-04-22 @ 03:00)  Wt(kg): --    ABG - ( 04 Dec 2022 03:29 )  pH, Arterial: 7.41  pH, Blood: x     /  pCO2: 41    /  pO2: 113   / HCO3: 26    / Base Excess: 1.2   /  SaO2: 96                    12-02 @ 07:01  -  12-03 @ 07:00  --------------------------------------------------------  IN: 2989.5 mL / OUT: 2610 mL / NET: 379.5 mL        Mode: AC/ CMV (Assist Control/ Continuous Mandatory Ventilation)  RR (machine): 24  TV (machine): 450  FiO2: 40  PEEP: 5  ITime: 0.9  MAP: 10  PIP: 19      PHYSICAL EXAM:  GENERAL: NAD, lying in bed comfortably, lightly sedated  HEAD:  Atraumatic, Normocephalic  EYES: EOMI, PERRLA, conjunctiva and sclera clear  ENT: Moist mucous membranes  NECK: Supple, No JVD  CHEST/LUNG: Clear to auscultation bilaterally; No rales, rhonchi, wheezing, or rubs. Unlabored respirations  HEART: Regular rate and rhythm; No murmurs, rubs, or gallops  ABDOMEN: Bowel sounds present; Soft, Nontender, Nondistended. No hepatomegally  EXTREMITIES:  2+ Peripheral Pulses, brisk capillary refill. No clubbing, cyanosis, or edema  NERVOUS SYSTEM:  lightly sedated, no distress  MSK: FROM all 4 extremities, full and equal strength  SKIN: No rashes or lesions      LABS:  CBC Full  -  ( 03 Dec 2022 04:54 )  WBC Count : 9.97 K/uL  RBC Count : 2.35 M/uL  Hemoglobin : 7.2 g/dL  Hematocrit : 22.2 %  Platelet Count - Automated : 182 K/uL  Mean Cell Volume : 94.5 fl  Mean Cell Hemoglobin : 30.6 pg  Mean Cell Hemoglobin Concentration : 32.4 gm/dL  Auto Neutrophil # : x  Auto Lymphocyte # : x  Auto Monocyte # : x  Auto Eosinophil # : x  Auto Basophil # : x  Auto Neutrophil % : x  Auto Lymphocyte % : x  Auto Monocyte % : x  Auto Eosinophil % : x  Auto Basophil % : x    12-03    141  |  109<H>  |  93<H>  ----------------------------<  115<H>  3.9   |  23  |  4.32<H>    Ca    8.8      03 Dec 2022 04:54  Phos  4.6     12-03  Mg     2.4     12-03    TPro  6.1  /  Alb  1.8<L>  /  TBili  0.2  /  DBili  x   /  AST  20  /  ALT  15  /  AlkPhos  74  12-02        Culture Results:   Normal Respiratory Ivelisse present (12-01 @ 17:40)      RADIOLOGY & ADDITIONAL STUDIES REVIEWED:      [X ]GOALS OF CARE DISCUSSION WITH PATIENT/FAMILY/PROXY:    CRITICAL CARE TIME SPENT: 35 minutes

## 2022-12-04 NOTE — PROCEDURE NOTE - NSPROCDETAILS_GEN_ALL_CORE
location identified, draped/prepped, sterile technique used, needle inserted/introduced/positive blood return obtained via catheter/connected to a pressurized flush line/sutured in place/all materials/supplies accounted for at end of procedure
guidewire recovered
location identified, draped/prepped, sterile technique used/blood seen on insertion/dressing applied/flushes easily/secured in place/sterile technique, catheter placed

## 2022-12-04 NOTE — PROGRESS NOTE ADULT - SUBJECTIVE AND OBJECTIVE BOX
Covering for Dr Bianca hebert MD  [phone]    GAYLA PEARCE  62y  Patient is a 62y old  Male who presents with a chief complaint of Shock, Acute Encephalopathy (04 Dec 2022 09:22)    HPI:  Seen and examined.  Sedated and intubated. S/P HD yesterday.  He is passing urine.    HEALTH ISSUES - PROBLEM Dx:        MEDICATIONS  (STANDING):  albuterol    90 MICROgram(s) HFA Inhaler 2 Puff(s) Inhalation every 6 hours  azithromycin  IVPB 500 milliGRAM(s) IV Intermittent every 24 hours  cefTRIAXone   IVPB 2000 milliGRAM(s) IV Intermittent every 24 hours  chlorhexidine 0.12% Liquid 15 milliLiter(s) Oral Mucosa every 12 hours  chlorhexidine 2% Cloths 1 Application(s) Topical <User Schedule>  collagenase Ointment 1 Application(s) Topical two times a day  epoetin madyson-epbx (RETACRIT) Injectable 8000 Unit(s) IV Push <User Schedule>  finasteride 5 milliGRAM(s) Oral daily  heparin   Injectable 5000 Unit(s) SubCutaneous every 8 hours  levETIRAcetam  IVPB 500 milliGRAM(s) IV Intermittent every 12 hours  levothyroxine 125 MICROGram(s) Oral daily  mupirocin 2% Ointment 1 Application(s) Both Nostrils two times a day  pantoprazole   Suspension 40 milliGRAM(s) Oral daily  polyethylene glycol 3350 17 Gram(s) Oral at bedtime  risperiDONE   Tablet 2 milliGRAM(s) Oral daily  senna Syrup 10 milliLiter(s) Oral at bedtime  sevelamer carbonate Powder 800 milliGRAM(s) Enteral Tube three times a day  tamsulosin 0.4 milliGRAM(s) Oral at bedtime  valproic  acid Syrup 650 milliGRAM(s) Oral every 6 hours  zinc oxide 20% Ointment 1 Application(s) Topical two times a day    MEDICATIONS  (PRN):    Vital Signs Last 24 Hrs  T(C): 35.7 (04 Dec 2022 09:30), Max: 36.6 (04 Dec 2022 04:03)  T(F): 96.2 (04 Dec 2022 09:30), Max: 97.9 (04 Dec 2022 04:03)  HR: 78 (04 Dec 2022 11:45) (33 - 114)  BP: --  BP(mean): --  RR: 14 (04 Dec 2022 11:45) (11 - 30)  SpO2: 99% (04 Dec 2022 11:45) (95% - 100%)    Parameters below as of 03 Dec 2022 23:00  Patient On (Oxygen Delivery Method): ventilator    O2 Concentration (%): 40  Daily     Daily Weight in k.6 (04 Dec 2022 07:00)    PHYSICAL EXAM:  Constitutional: He appears comfortable and not distressed. Not diaphoretic. Obese.    Neck:  The thyroid is normal. Trachea is midline.     Respiratory: The lungs are clear to auscultation. No dullness and expansion is normal.    Cardiovascular: S1 and S2 are normal. No murmurs, rubs or gallops are present.    Gastrointestinal: The abdomen is soft. No tenderness is present.     Genitourinary: The bladder is not distended. No CVA tenderness is present.    Extremities: No edema is noted. No deformities are present.    Neurological: Sedated.    Skin: No lesions are seen  or palpated.    Lymph Nodes: No lymphadenopathy is present.      I&O's Summary    03 Dec 2022 07:01  -  04 Dec 2022 07:00  --------------------------------------------------------  IN: 2073 mL / OUT: 2535 mL / NET: -462 mL    04 Dec 2022 07:01  -  04 Dec 2022 12:02  --------------------------------------------------------  IN: 447 mL / OUT: 350 mL / NET: 97 mL                            7.0    10.09 )-----------( 165      ( 04 Dec 2022 03:50 )             21.6     1204    144  |  106  |  63<H>  ----------------------------<  128<H>  3.4<L>   |  28  |  3.00<H>  Blood Gas Profile - Arterial (22 @ 03:29)   pH, Arterial: 7.41   pCO2, Arterial: 41 mmHg   pO2, Arterial: 113 mmHg   HCO3, Arterial: 26 mmol/L     Ca    8.9      04 Dec 2022 03:50  Phos  4.1     -  Mg     2.2         TPro  6.1  /  Alb  1.8<L>  /  TBili  0.2  /  DBili  x   /  AST  20  /  ALT  15  /  AlkPhos  74

## 2022-12-04 NOTE — EEG REPORT - NS EEG TEXT BOX
[facility]  Comprehensive Epilepsy Center  Report of Routine EEG with Video    [facility]: [address], [place] [address], Phone [phone]  [place] Office: [address], [address], [place] [address] Phone [phone]    [facility]: [address] [place] [address], Phone [phone]  [place] Office: [address] [place] [address], Phone [phone]    Patient Name: GAYLA PEARCE    Age: 62 year  : 1960  Patient ID: -, MRN #: -, Location: ICU BED 6 D1-  Referring Physician: DR OSBORNE  EEG #: [id number]    Study Date: 2022     Technical Information:					  On Instrument: -  Placement and Labeling of Electrodes:  The EEG was performed utilizing 20 channels referential EEG connections (coronal over temporal over parasagittal montage) using all standard 10-20 electrode placements.  Recording was at a sampling rate of 256 samples per second per channel.  Time synchronized digital video recording was done simultaneously with EEG recording.  A low light infrared camera was used for low light recording.  Costa and seizure detection algorithms were utilized.    ROUTINE EEG PERFORMED AT BEDSIDE  63 Y/O MALE  H/O : MORBID OBESE MALE, AMBULATORY DYSFUNCTION ACXD, BIPOLAR DISORDER  BPH, CKD, PVD, COPD, NOT ON OXYGEN, ANEMIA, HYPOTHYROIDISMRBBB, LYMPHEDEMA, CHF (LAST ECHO 2022 NORMAL EF GIDD ) , WAS BIBEMS FOR HYPOTENSION ( 73/37 MMHG) AND BRADYCARDIA 40'S  P/W : R/O SEIZURE AMS / RE TWITCHING    MEDICATIONS  (STANDING):  albuterol    90 MICROgram(s) HFA Inhaler 2 Puff(s) Inhalation every 6 hours  azithromycin  IVPB 500 milliGRAM(s) IV Intermittent every 24 hours  cefTRIAXone   IVPB 2000 milliGRAM(s) IV Intermittent every 24 hours  chlorhexidine 2% Cloths 1 Application(s) Topical <User Schedule>  collagenase Ointment 1 Application(s) Topical two times a day  epoetin madyson-epbx (RETACRIT) Injectable 8000 Unit(s) IV Push <User Schedule>  finasteride 5 milliGRAM(s) Oral daily  heparin   Injectable 5000 Unit(s) SubCutaneous every 8 hours  levETIRAcetam  IVPB 500 milliGRAM(s) IV Intermittent every 12 hours  levothyroxine 125 MICROGram(s) Oral daily  mupirocin 2% Ointment 1 Application(s) Both Nostrils two times a day  pantoprazole   Suspension 40 milliGRAM(s) Oral daily  polyethylene glycol 3350 17 Gram(s) Oral at bedtime  risperiDONE   Tablet 2 milliGRAM(s) Oral daily  senna Syrup 10 milliLiter(s) Oral at bedtime  sevelamer carbonate Powder 800 milliGRAM(s) Enteral Tube three times a day  tamsulosin 0.4 milliGRAM(s) Oral at bedtime  valproic  acid Syrup 650 milliGRAM(s) Oral every 6 hours  zinc oxide 20% Ointment 1 Application(s) Topical two times a day    Study Interpretation:  Findings: The background was continuous and reactive.   PDR: 7 Hz during maximal wakefulness  Diffuse theta and polymorphic delta slowing.    Focal Slowing:   None were present.    Sleep Background:  Stage II sleep transients were not recorded.    Other Non-Epileptiform Findings:  None were present.    Interictal Epileptiform Activity:   None were present.    Events:  No event or seizure recorded.    Activation Procedures:   Hyperventilation was not performed.    Photic stimulation was not performed      Artifacts:  Intermittent myogenic and movement artifacts were noted.    EEG Summary / Classification:  Abnormal EEG   - Mild to Moderate generalized slowing.    EEG Impression / Clinical Correlate:  Abnormal EEG study.  Mild to Moderate nonspecific diffuse or multifocal cerebral dysfunction.   No epileptiform pattern or seizure seen.    ________________________________________    Placido Burns MD  Epilepsy Fellow , [facility]  Department of Neurology, [facility]    [facility] EEG Reading Room Ph#: [phone]  Epilepsy Answering Service after 5PM and before 8:30AM: Ph#: [phone]     [facility]  Comprehensive Epilepsy Center  Report of Routine EEG with Video    [facility]: [address], [place] [address], Phone [phone]  [place] Office: [address], [address], [place] [address] Phone [phone]    [facility]: [address] [place] [address], Phone [phone]  [place] Office: [address] [place] [address], Phone [phone]    Patient Name: GAYLA PEARCE    Age: 62 year  : 1960  Patient ID: -, MRN #: -, Location: ICU BED 6 D1-  Referring Physician: DR OSBORNE  EEG #: [id number]    Study Date: 2022     Technical Information:					  On Instrument: -  Placement and Labeling of Electrodes:  The EEG was performed utilizing 20 channels referential EEG connections (coronal over temporal over parasagittal montage) using all standard 10-20 electrode placements.  Recording was at a sampling rate of 256 samples per second per channel.  Time synchronized digital video recording was done simultaneously with EEG recording.  A low light infrared camera was used for low light recording.  Costa and seizure detection algorithms were utilized.    ROUTINE EEG PERFORMED AT BEDSIDE  61 Y/O MALE  H/O : MORBID OBESE MALE, AMBULATORY DYSFUNCTION ACXD, BIPOLAR DISORDER  BPH, CKD, PVD, COPD, NOT ON OXYGEN, ANEMIA, HYPOTHYROIDISMRBBB, LYMPHEDEMA, CHF (LAST ECHO 2022 NORMAL EF GIDD ) , WAS BIBEMS FOR HYPOTENSION ( 73/37 MMHG) AND BRADYCARDIA 40'S  P/W : R/O SEIZURE AMS / RE TWITCHING    MEDICATIONS  (STANDING):  albuterol    90 MICROgram(s) HFA Inhaler 2 Puff(s) Inhalation every 6 hours  azithromycin  IVPB 500 milliGRAM(s) IV Intermittent every 24 hours  cefTRIAXone   IVPB 2000 milliGRAM(s) IV Intermittent every 24 hours  chlorhexidine 2% Cloths 1 Application(s) Topical <User Schedule>  collagenase Ointment 1 Application(s) Topical two times a day  epoetin madyson-epbx (RETACRIT) Injectable 8000 Unit(s) IV Push <User Schedule>  finasteride 5 milliGRAM(s) Oral daily  heparin   Injectable 5000 Unit(s) SubCutaneous every 8 hours  levETIRAcetam  IVPB 500 milliGRAM(s) IV Intermittent every 12 hours  levothyroxine 125 MICROGram(s) Oral daily  mupirocin 2% Ointment 1 Application(s) Both Nostrils two times a day  pantoprazole   Suspension 40 milliGRAM(s) Oral daily  polyethylene glycol 3350 17 Gram(s) Oral at bedtime  risperiDONE   Tablet 2 milliGRAM(s) Oral daily  senna Syrup 10 milliLiter(s) Oral at bedtime  sevelamer carbonate Powder 800 milliGRAM(s) Enteral Tube three times a day  tamsulosin 0.4 milliGRAM(s) Oral at bedtime  valproic  acid Syrup 650 milliGRAM(s) Oral every 6 hours  zinc oxide 20% Ointment 1 Application(s) Topical two times a day    Study Interpretation:  Findings: The background was continuous and reactive.   PDR: 7 Hz during maximal wakefulness  Diffuse theta and polymorphic delta slowing.    Focal Slowing:   None were present.    Sleep Background:  Stage II sleep transients were not recorded.    Other Non-Epileptiform Findings:  None were present.    Interictal Epileptiform Activity:   None were present.    Events:  No event or seizure recorded.    Activation Procedures:   Hyperventilation was not performed.    Photic stimulation was not performed      Artifacts:  Intermittent myogenic and movement artifacts were noted.    EEG Summary / Classification:  Abnormal EEG   - Mild to Moderate generalized slowing.    EEG Impression / Clinical Correlate:  Abnormal EEG study.  Mild to Moderate nonspecific diffuse or multifocal cerebral dysfunction.   No epileptiform pattern or seizure seen.    ________________________________________    Placido Burns MD  Epilepsy Fellow , [facility]  Department of Neurology, [facility]    Feliciano Esquivel MD  EEG/Epilepsy Attending     [facility] EEG Reading Room Ph#: [phone]  Epilepsy Answering Service after 5PM and before 8:30AM: Ph#: [phone]

## 2022-12-04 NOTE — PROGRESS NOTE ADULT - ASSESSMENT
1. SHEYLA due to ATN  Likely ATN from shock, superimposed on CKD. Started on HD.  He is Not Oliguric.  - Will evaluate in am for continued HD.  -Adjust meds to eGFR and avoid IV Gadolinium contrast NSAIDs, and phosphate enema.  -Monitor I/O's daily.   -Monitor SMA daily.    2. CKD stage 4 most likely due to ischemic nephropathy  His baseline scr around 3.7mg/dL in july 2022.  -Keep patient euvolemic and renal diet  -Avoid Nephrotoxic Meds/ Agents such as (NSAIDs, IV contrast, Aminoglycosides such as gentamicin, -Gadolinium contrast, Phosphate containing enemas, etc..)  -Adjust Medications according to eGFR.    3. Shock:  Likely septic shock.  Target MAP > 65 mm Hg.  - Care as per ICU    4. Mineral Bone Disease:  PTH is 400 and Ca is 8.8, corrected Ca is 11.8.  -phos is improving and f/u Vitamin 25 OH.  - Renvela tid once taking oral meds.  - Hold Calcitriol.  - Follow up Corrected Ca.    5. Mixed Acidosis:  Has AG and Respiratory Acidosis.  - Repeat ABG as he is at risk for Alkalosis now.    6. Bradycardia due to unclear etiology.  -improving heart rate.  -trend troponins  -TSH is nl.    7. Respiratory Failure.  -s/p intubated on 11/30 for worsening respiratory support.  -continue vent support  -plan as per MICU.     8. Anemia of ESRD:  -check iron panel and ferritin in am.  -order Epogen 8000units IV/SC TIW  -F/u CBC daily  -transfuse if HB < 7.0.      Patient is critically ill. Time Spent: 35mins.  Discussed the assessment and plan with ICU Team/Nurse

## 2022-12-04 NOTE — PROGRESS NOTE ADULT - SUBJECTIVE AND OBJECTIVE BOX
INTERVAL HPI/OVERNIGHT EVENTS:     PRESSORS: [ ] YES [x ] NO  WHICH:    ANTIBIOTICS:    azithromycin              DATE STARTED:  ANTIBIOTICS:     CTX             DATE STARTED:  ANTIBIOTICS:                  DATE STARTED:    Antimicrobial:  azithromycin  IVPB 500 milliGRAM(s) IV Intermittent every 24 hours  cefTRIAXone   IVPB 2000 milliGRAM(s) IV Intermittent every 24 hours    Cardiovascular:    Pulmonary:  albuterol    90 MICROgram(s) HFA Inhaler 2 Puff(s) Inhalation every 6 hours    Hematalogic:  heparin   Injectable 5000 Unit(s) SubCutaneous every 8 hours    Other:  chlorhexidine 2% Cloths 1 Application(s) Topical <User Schedule>  collagenase Ointment 1 Application(s) Topical two times a day  epoetin madyson-epbx (RETACRIT) Injectable 8000 Unit(s) IV Push <User Schedule>  finasteride 5 milliGRAM(s) Oral daily  levETIRAcetam  IVPB 500 milliGRAM(s) IV Intermittent every 12 hours  levothyroxine 125 MICROGram(s) Oral daily  mupirocin 2% Ointment 1 Application(s) Both Nostrils two times a day  pantoprazole   Suspension 40 milliGRAM(s) Oral daily  polyethylene glycol 3350 17 Gram(s) Oral at bedtime  risperiDONE   Tablet 2 milliGRAM(s) Oral daily  senna Syrup 10 milliLiter(s) Oral at bedtime  sevelamer carbonate Powder 800 milliGRAM(s) Enteral Tube three times a day  tamsulosin 0.4 milliGRAM(s) Oral at bedtime  valproic  acid Syrup 650 milliGRAM(s) Oral every 6 hours  zinc oxide 20% Ointment 1 Application(s) Topical two times a day      Drug Dosing Weight  Height (cm): 177.8 (30 Nov 2022 18:45)  Weight (kg): 113.9 (30 Nov 2022 18:45)  BMI (kg/m2): 36 (30 Nov 2022 18:45)  BSA (m2): 2.3 (30 Nov 2022 18:45)    CENTRAL LINE: [x ] YES [ ] NO  LOCATION:   DATE INSERTED: 11/30  REMOVE: [ ] YES [ ] NO  EXPLAIN:    HATFIELD: [x ] YES [ ] NO    DATE INSERTED: 11/30  REMOVE:  [ ] YES [ ] NO  EXPLAIN:    A-LINE:  [x ] YES [ ] NO  LOCATION:   DATE INSERTED: 11/30  REMOVE:  [ ] YES [ ] NO  EXPLAIN:    PMH/Social Hx/Fam Hx -reviewed admission note, no change since admission  PAST MEDICAL & SURGICAL HISTORY:  Hypothyroid      Hyperlipidemia      Ambulatory dysfunction      Anemia      History of BPH      CKD (chronic kidney disease)      Chronic obstructive pulmonary disease (COPD)      Bipolar disorder      HLD (hyperlipidemia)      BPH (benign prostatic hyperplasia)      Chronic diastolic congestive heart failure      Lymphedema      No significant past surgical history        Heart faliure: acute [ ] chronic [ ] acute or chronic [ ] diastolic [ ] systolic [ ] combied systolic and diastolic[ ]  SHEYLA: ATN[ ] renal medullary necrosis [ ] CKD I [ ]CKDII [ ]CKD III [ ]CKD IV [ ]CKD V [ ]Other pathological lesions [ ]  Abdominal Nutrition Status: malnutrition [ ] cachexia [ ] morbid obesity/BMI=40 [ ] Supplement ordered [___________]     T(C): 35.7 (12-04-22 @ 09:30), Max: 36.6 (12-04-22 @ 04:03)  HR: 108 (12-04-22 @ 13:00)  BP: 123/65 (12-04-22 @ 13:00)  BP(mean): 80 (12-04-22 @ 13:00)  ABP: 127/51 (12-04-22 @ 13:00)  ABP(mean): 75 (12-04-22 @ 13:00)  RR: 34 (12-04-22 @ 13:00)  SpO2: 97% (12-04-22 @ 13:00)  Wt(kg): --    ABG - ( 04 Dec 2022 03:29 )  pH, Arterial: 7.41  pH, Blood: x     /  pCO2: 41    /  pO2: 113   / HCO3: 26    / Base Excess: 1.2   /  SaO2: 96                    12-03 @ 07:01  -  12-04 @ 07:00  --------------------------------------------------------  IN: 2073 mL / OUT: 2535 mL / NET: -462 mL        Mode: PS (Pressure Support)/ Spontaneous  FiO2: 40  PEEP: 5  PS: 5  ITime: 1  MAP: 6  PIP: 10      PHYSICAL EXAM:    GENERAL: NAD, well-developed, morbid obesity  HEAD:  atraumatic, normocephalic  EYES: EOMI, PERRL, conjunctiva and sclera clear  ENMT:  Moist mucous membranes,  [ ]No lesions  NECK: Supple, normal appearance, No JVD; Trachea midline  NERVOUS SYSTEM:  Alert & Oriented X3  CHEST/LUNG: No chest deformity; No rales, rhonchi, wheezing; EXAM LIMITED BY BODY HABITUS  HEART: Regular rate and rhythm; No murmurs, rubs, or gallops, EXAM LIMITED BY BODY HABITUS  ABDOMEN: Soft, Nontender, Nondistended; Bowel sounds present  : voiding well, [ ] Hatfield in place  EXTREMITIES:  ]2+ Peripheral Pulses, Bilat lower extremity edema, chronic ulceration and discoloration of b/l extremities from feet to just distal to knee, purplish discoloration + crusting and green discoloration  LYMPH: No lymphadenopathy noted  SKIN: b/l ulcers from feet to below knee, purplish with crusting and green discoloration, Good capillary refill      LABS:  CBC Full  -  ( 04 Dec 2022 03:50 )  WBC Count : 10.09 K/uL  RBC Count : 2.30 M/uL  Hemoglobin : 7.0 g/dL  Hematocrit : 21.6 %  Platelet Count - Automated : 165 K/uL  Mean Cell Volume : 93.9 fl  Mean Cell Hemoglobin : 30.4 pg  Mean Cell Hemoglobin Concentration : 32.4 gm/dL  Auto Neutrophil # : x  Auto Lymphocyte # : x  Auto Monocyte # : x  Auto Eosinophil # : x  Auto Basophil # : x  Auto Neutrophil % : x  Auto Lymphocyte % : x  Auto Monocyte % : x  Auto Eosinophil % : x  Auto Basophil % : x    12-04    144  |  106  |  63<H>  ----------------------------<  128<H>  3.4<L>   |  28  |  3.00<H>    Ca    8.9      04 Dec 2022 03:50  Phos  4.1     12-04  Mg     2.2     12-04    TPro  6.1  /  Alb  1.8<L>  /  TBili  0.2  /  DBili  x   /  AST  20  /  ALT  15  /  AlkPhos  74  12-02        Culture Results:   Normal Respiratory Ivelisse present (12-01 @ 17:40)      RADIOLOGY & ADDITIONAL STUDIES REVIEWED:      [ ]GOALS OF CARE DISCUSSION WITH PATIENT/FAMILY/PROXY:    CRITICAL CARE TIME SPENT: ____ minutes

## 2022-12-04 NOTE — PROGRESS NOTE ADULT - ASSESSMENT
62 year old morbid obese male, from [facility], has past medical hx of ambulatory dysfunction, ACD, bipolar disorder, BPH, CKD, HLD, PVD, COPD not on oxygen, hypothyroidism, RBBB, lymphedema, CHF (last echo 2/22 normal EF, GIDD) was BIBEMS for hypotension 73/37 mmHg and bradycardia (40's). Patient admitted to ICU for shock and acute encephalopathy.     DX:  - Shock septic vs hypovolemia  - LE cellulitis   - Acute encephalopathy   - Sinus bradycardia  - HAGMA   - SHEYLA on CKD  - Anemia   - COPD not on oxygen at home   - HLD   - BPH  - Hypothyroidism   - Mood disorder       =================== Neuro============================  #Acute Encephalopathy; likely in the setting of acidemia, infection, uremia    Lethargic, fighting vent    Pain meds with Tylenol   CT Head negative for acute bleed/swelling  Off sedation    #Seizure  - Home med includes valproic acid  - Patient is having repeated muscle spasms, improved on Versed but not fully stopped  - Still having muscle movements after dialysis which brought down BUN  - Started propofol instead of precedex  - Valproic acid level low, likely due to missed doses when patient was originally admitted.  - cont keppra and valproate as per neuro recs, prefer valproate as less nephrotoxic  - spot EEG 12/3 - moderate generalized slowing    ================= Cardiovascular==========================  Sinus bradycardia  - EKG sinus bradycardia, old RBBB  - TTE 12/1 - LVEF 60-65%  - trops neg  - Given normal EF, no changes in EKG, cardiogenic shock less likely    Hypotension  - off pressers      ================- Pulm=================================  COPD  - Ventilated on 20/450/60%/8  - Patient with hx of COPD not in exacerbation   - Chest X ray Showed bilateral patchy infiltrates, no concern for fluid overload    - Oxygen supplementation as needed to maintain SpO2 of 88-92%  - C/w home meds       ==================ID===================================  Shock  - Concern for sepsis vs hypovolemic   - Empirical treatment with Cefepime for LE cellulitis and for pseudomonal coverage  - S/p 4L NS   - Started on pressors   F/u lactate 1.4, procalcitonin, Bcx, Ucx, CK NGTD  - ID consulted Dr Nolasco     ================= Nephro================================  SHEYLA on CKD  - On admission, patient with Cr 6.4>>5.6, baseline 3.7  - Worsening CKD most likely a combination of obstructive vs pre- renal   - Viera catheter placed in ED with 1000 cc of UO  - -2200L in the past 24 hours  - Avoid nephrotoxins, NSAIDS, ACEI and ARBS  - Monitor BMP daily  - Nephro Dr Valenzuela consulted  - S/p Dialysis 12/2  [ ] Dialysis 12/3  [ ]Establish Line access via vascular    HAGMA  - Most likely from uremia   - ABG 7.2/44/97/17 >>7.34/34/102/18  - Resume Bicarb 325 Q6 hours  - Nephro Dr Valenzuela consulted    =================GI====================================  NGT in place  Start tube feeds  Resume psych PO medications- Wellbutrin  [ ] Bowel regimen, stool impaction    ================ Heme==================================  Anemia   - Anemia of chronic disease   - Monitor CBC daily   - Hgb Stable no signs of active bleed    =================Endocrine===============================  Hypothyroidism  - F/u TSH Normal   - C/w home meds when not NPO     ================= Skin/Catheters============================  Peripheral IV lines   Viera catheter   A line and CVC   Sacral and Posterior Thigh Ulcers, Wound Care Consulted  [ ] Pending dialysis access    =================Prophylaxis =============================  DVT prophylaxis with Heparin SQ daily   GI prophylaxis with PPI daily    ==================GOC==================================  FULL CODE    62 year old morbid obese male, from [facility], has past medical hx of ambulatory dysfunction, ACD, bipolar disorder, BPH, CKD, HLD, PVD, COPD not on oxygen, hypothyroidism, RBBB, lymphedema, CHF (last echo 2/22 normal EF, GIDD) was BIBEMS for hypotension 73/37 mmHg and bradycardia (40's). Patient admitted to ICU for shock and acute encephalopathy.     DX:  - Shock septic vs hypovolemia  - LE cellulitis   - Acute encephalopathy   - Sinus bradycardia  - HAGMA   - SHEYLA on CKD  - Anemia   - COPD not on oxygen at home   - HLD   - BPH  - Hypothyroidism   - Mood disorder       =================== Neuro============================  #Acute Encephalopathy; likely in the setting of acidemia, infection, uremia    Lethargic, fighting vent    Pain meds with Tylenol   CT Head negative for acute bleed/swelling  Off sedation    #Seizure  - Home med includes valproic acid  - Patient is having repeated muscle spasms, improved on Versed but not fully stopped  - Still having muscle movements after dialysis which brought down BUN  - Started propofol instead of precedex  - Valproic acid level low, likely due to missed doses when patient was originally admitted.  - cont keppra and valproate as per neuro recs, prefer valproate as less nephrotoxic  - spot EEG 12/3 - moderate generalized slowing    ================= Cardiovascular==========================  Sinus bradycardia  - EKG sinus bradycardia, old RBBB  - TTE 12/1 - LVEF 60-65%  - trops neg  - Given normal EF, no changes in EKG, cardiogenic shock less likely    Hypotension  - off pressers      ================- Pulm=================================  COPD  - Ventilated on 20/450/60%/8  - Patient with hx of COPD not in exacerbation   - Chest X ray Showed bilateral patchy infiltrates, no concern for fluid overload    - Oxygen supplementation as needed to maintain SpO2 of 88-92%  - C/w home meds       ==================ID===================================  Shock  - Concern for sepsis vs hypovolemic   - Empirical treatment with Cefepime for LE cellulitis and for pseudomonal coverage  - S/p 4L NS   - Started on pressors   F/u lactate 1.4, procalcitonin, Bcx, Ucx, CK NGTD  - ID consulted Dr Nolasco     ================= Nephro================================  SHEYLA on CKD  - On admission, patient with Cr 6.4>>5.6, baseline 3.7  - Worsening CKD most likely a combination of obstructive vs pre- renal   - Viera catheter placed in ED with 1000 cc of UO  - -2200L in the past 24 hours  - Avoid nephrotoxins, NSAIDS, ACEI and ARBS  - Monitor BMP daily  - Nephro Dr Valenzuela consulted  - S/p Dialysis 12/2  [ ] Dialysis 12/3  [ ]Establish Line access via vascular    HAGMA  - Most likely from uremia   - ABG 7.2/44/97/17 >>7.34/34/102/18  - Resume Bicarb 325 Q6 hours  - Nephro Dr Valenzuela consulted    =================GI====================================  NGT in place  Start tube feeds  Resume psych PO medications- Wellbutrin  [ ] Bowel regimen, stool impaction    ================ Heme==================================  Anemia   - Anemia of chronic disease   - Monitor CBC daily   - Hgb Stable no signs of active bleed  - 12/4 - Hb 7.0, s/p 2U PRBC  - f/u repeat CBC    =================Endocrine===============================  Hypothyroidism  - F/u TSH Normal   - C/w home meds when not NPO     ================= Skin/Catheters============================  Peripheral IV lines   Viera catheter   A line and CVC   Sacral and Posterior Thigh Ulcers, Wound Care Consulted  [ ] Pending dialysis access    =================Prophylaxis =============================  DVT prophylaxis with Heparin SQ daily   GI prophylaxis with PPI daily    ==================GOC==================================  FULL CODE    62 year old morbid obese male, from [facility], has past medical hx of ambulatory dysfunction, ACD, bipolar disorder, BPH, CKD, HLD, PVD, COPD not on oxygen, hypothyroidism, RBBB, lymphedema, CHF (last echo 2/22 normal EF, GIDD) was BIBEMS for hypotension 73/37 mmHg and bradycardia (40's). Patient admitted to ICU for shock and acute encephalopathy.     DX:  - Shock septic vs hypovolemia  - LE cellulitis   - Acute encephalopathy   - Sinus bradycardia  - HAGMA   - SHEYLA on CKD  - Anemia   - COPD not on oxygen at home   - HLD   - BPH  - Hypothyroidism   - Mood disorder       =================== Neuro============================  #Acute Encephalopathy; likely in the setting of acidemia, infection, uremia    Lethargic, fighting vent    Pain meds with Tylenol   CT Head negative for acute bleed/swelling  Off sedation    #Seizure  - Home med includes valproic acid  - Patient is having repeated muscle spasms, improved on Versed but not fully stopped  - Still having muscle movements after dialysis which brought down BUN  - Started propofol instead of precedex  - Valproic acid level low, likely due to missed doses when patient was originally admitted.  - cont keppra and valproate as per neuro recs, prefer valproate as less nephrotoxic  - spot EEG 12/3 - moderate generalized slowing    ================= Cardiovascular==========================  Sinus bradycardia  - EKG sinus bradycardia, old RBBB  - TTE 12/1 - LVEF 60-65%  - trops neg  - Given normal EF, no changes in EKG, cardiogenic shock less likely    Hypotension  - off pressers      ================- Pulm=================================  COPD  - Patient with hx of COPD not in exacerbation   - Chest X ray Showed bilateral patchy infiltrates, no concern for fluid overload    - Oxygen supplementation as needed to maintain SpO2 of 88-92%  - C/w home meds   - currently on high flow O2 (12/4), but pt may require Bipap      ==================ID===================================  Shock  - Concern for sepsis vs hypovolemic   - Empirical treatment with Cefepime for LE cellulitis and for pseudomonal coverage  - S/p 4L NS   - Started on pressors   F/u lactate 1.4, procalcitonin, Bcx, Ucx, CK NGTD  - ID consulted Dr Nolasco     ================= Nephro================================  SHEYLA on CKD  - On admission, patient with Cr 6.4>>5.6, baseline 3.7  - Worsening CKD most likely a combination of obstructive vs pre- renal   - Viera catheter placed in ED with 1000 cc of UO  - -2200L in the past 24 hours  - Avoid nephrotoxins, NSAIDS, ACEI and ARBS  - Monitor BMP daily  - Nephro Dr Valenzuela consulted  - S/p Dialysis 12/2  [ ] Dialysis 12/3  [ ]Establish Line access via vascular    HAGMA  - Most likely from uremia   - ABG 7.2/44/97/17 >>7.34/34/102/18  - Resume Bicarb 325 Q6 hours  - Nephro Dr Valenzuela consulted    =================GI====================================  NGT in place  Start tube feeds  Resume psych PO medications- Wellbutrin  [ ] Bowel regimen, stool impaction    ================ Heme==================================  Anemia   - Anemia of chronic disease   - Monitor CBC daily   - Hgb Stable no signs of active bleed  - 12/4 - Hb 7.0, s/p 2U PRBC  - f/u repeat CBC    =================Endocrine===============================  Hypothyroidism  - F/u TSH Normal   - C/w home meds when not NPO     ================= Skin/Catheters============================  Peripheral IV lines   Viera catheter   A line and CVC   Sacral and Posterior Thigh Ulcers, Wound Care Consulted  [ ] Pending dialysis access    =================Prophylaxis =============================  DVT prophylaxis with Heparin SQ daily   GI prophylaxis with PPI daily    ==================GOC==================================  FULL CODE    62 year old morbid obese male, from [facility], has past medical hx of ambulatory dysfunction, ACD, bipolar disorder, BPH, CKD, HLD, PVD, COPD not on oxygen, hypothyroidism, RBBB, lymphedema, CHF (last echo 2/22 normal EF, GIDD) was BIBEMS for hypotension 73/37 mmHg and bradycardia (40's). Patient admitted to ICU for shock and acute encephalopathy.     DX:  - Shock septic vs hypovolemia  - LE cellulitis   - Acute encephalopathy   - Sinus bradycardia  - HAGMA   - SHEYLA on CKD  - Anemia   - COPD not on oxygen at home   - HLD   - BPH  - Hypothyroidism   - Mood disorder       =================== Neuro============================  #Acute Encephalopathy; likely in the setting of acidemia, infection, uremia    Lethargic, fighting vent    Pain meds with Tylenol   CT Head negative for acute bleed/swelling  Off sedation    #Seizure  - Home med includes valproic acid  - Patient is having repeated muscle spasms, improved on Versed but not fully stopped  - Still having muscle movements after dialysis which brought down BUN  - Started propofol instead of precedex  - Valproic acid level low, likely due to missed doses when patient was originally admitted.  - cont keppra and valproate as per neuro recs, prefer valproate as less nephrotoxic  - spot EEG 12/3 - moderate generalized slowing    ================= Cardiovascular==========================  Sinus bradycardia  - EKG sinus bradycardia, old RBBB  - TTE 12/1 - LVEF 60-65%  - trops neg  - Given normal EF, no changes in EKG, cardiogenic shock less likely    Hypotension  - off pressers      ================- Pulm=================================  COPD  - Patient with hx of COPD not in exacerbation   - Chest X ray Showed bilateral patchy infiltrates, no concern for fluid overload    - Oxygen supplementation as needed to maintain SpO2 of 88-92%  - C/w home meds   - currently on high flow O2 (12/4), but pt may require Bipap      ==================ID===================================  Shock  - Concern for sepsis vs hypovolemic   - Empirical treatment with Cefepime for LE cellulitis and for pseudomonal coverage  - S/p 4L NS   - Started on pressors   F/u lactate 1.4, procalcitonin, Bcx, Ucx, CK NGTD  - ID consulted Dr Nolasco     ================= Nephro================================  SHEYLA on CKD  - On admission, patient with Cr 6.4>>5.6, baseline 3.7  - Worsening CKD most likely a combination of obstructive vs pre- renal   - Viera catheter placed in ED with 1000 cc of UO  - -2200L in the past 24 hours  - Avoid nephrotoxins, NSAIDS, ACEI and ARBS  - Monitor BMP daily  - Nephro Dr Valenzuela consulted  - S/p Dialysis 12/2  [ ] Dialysis 12/3  [ ]Establish Line access via vascular    HAGMA  - Most likely from uremia   - ABG 7.2/44/97/17 >>7.34/34/102/18  - Resume Bicarb 325 Q6 hours  - Nephro Dr Valenzuela consulted    =================GI====================================  NGT in place  Start tube feeds  Resume psych PO medications- Wellbutrin  [ ] Bowel regimen, stool impaction    ================ Heme==================================  Anemia   - Anemia of chronic disease   - Monitor CBC daily   - Hgb Stable no signs of active bleed  - 12/4 - Hb 7.0, s/p 1U PRBC  - f/u repeat CBC    =================Endocrine===============================  Hypothyroidism  - F/u TSH Normal   - C/w home meds when not NPO     ================= Skin/Catheters============================  Peripheral IV lines   Viera catheter   A line and CVC   Sacral and Posterior Thigh Ulcers, Wound Care Consulted  [ ] Pending dialysis access    =================Prophylaxis =============================  DVT prophylaxis with Heparin SQ daily   GI prophylaxis with PPI daily    ==================GOC==================================  FULL CODE    62 year old morbid obese male, from [facility], has past medical hx of ambulatory dysfunction, ACD, bipolar disorder, BPH, CKD, HLD, PVD, COPD not on oxygen, hypothyroidism, RBBB, lymphedema, CHF (last echo 2/22 normal EF, GIDD) was BIBEMS for hypotension 73/37 mmHg and bradycardia (40's). Patient admitted to ICU for shock and acute encephalopathy.     DX:  - Shock septic vs hypovolemia  - LE cellulitis   - Acute encephalopathy   - Sinus bradycardia  - HAGMA   - SHEYLA on CKD  - Anemia   - COPD not on oxygen at home   - HLD   - BPH  - Hypothyroidism   - Mood disorder       =================== Neuro============================  #Acute Encephalopathy; likely in the setting of acidemia, infection, uremia    Lethargic, fighting vent    Pain meds with Tylenol   CT Head negative for acute bleed/swelling  Off sedation    #Seizure  - Home med includes valproic acid  - Patient is having repeated muscle spasms, improved on Versed but not fully stopped  - Still having muscle movements after dialysis which brought down BUN  - Started propofol instead of precedex  - Valproic acid level low, likely due to missed doses when patient was originally admitted.  - cont keppra and valproate as per neuro recs, prefer valproate as less nephrotoxic  - spot EEG 12/3 - moderate generalized slowing  - f/u 24 hr EEG (12/4)    ================= Cardiovascular==========================  Sinus bradycardia  - EKG sinus bradycardia, old RBBB  - TTE 12/1 - LVEF 60-65%  - trops neg  - Given normal EF, no changes in EKG, cardiogenic shock less likely    Hypotension  - off pressers      ================- Pulm=================================  COPD  - Patient with hx of COPD not in exacerbation   - Chest X ray Showed bilateral patchy infiltrates, no concern for fluid overload    - Oxygen supplementation as needed to maintain SpO2 of 88-92%  - C/w home meds   - currently on high flow O2 (12/4), but pt may require Bipap      ==================ID===================================  Shock  - Concern for sepsis vs hypovolemic   - Empirical treatment with Cefepime for LE cellulitis and for pseudomonal coverage  - S/p 4L NS   - Started on pressors   F/u lactate 1.4, procalcitonin, Bcx, Ucx, CK NGTD  - ID consulted Dr Nolasco     ================= Nephro================================  SHEYLA on CKD  - On admission, patient with Cr 6.4>>5.6, baseline 3.7  - Worsening CKD most likely a combination of obstructive vs pre- renal   - Viera catheter placed in ED with 1000 cc of UO  - -2200L in the past 24 hours  - Avoid nephrotoxins, NSAIDS, ACEI and ARBS  - Monitor BMP daily  - Nephro Dr Valenzuela consulted  - S/p Dialysis 12/2  [ ] Dialysis 12/3  [ ]Establish Line access via vascular    HAGMA  - Most likely from uremia   - ABG 7.2/44/97/17 >>7.34/34/102/18  - Resume Bicarb 325 Q6 hours  - Nephro Dr Valenzuela consulted    =================GI====================================  NGT in place  Start tube feeds  Resume psych PO medications- Wellbutrin  [ ] Bowel regimen, stool impaction    ================ Heme==================================  Anemia   - Anemia of chronic disease   - Monitor CBC daily   - Hgb Stable no signs of active bleed  - 12/4 - Hb 7.0, s/p 1U PRBC  - f/u repeat CBC    =================Endocrine===============================  Hypothyroidism  - F/u TSH Normal   - C/w home meds when not NPO     ================= Skin/Catheters============================  Peripheral IV lines   Viera catheter   A line and CVC   Sacral and Posterior Thigh Ulcers, Wound Care Consulted  [ ] Pending dialysis access    =================Prophylaxis =============================  DVT prophylaxis with Heparin SQ daily   GI prophylaxis with PPI daily    ==================GOC==================================  FULL CODE

## 2022-12-04 NOTE — PROGRESS NOTE ADULT - ATTENDING COMMENTS
62 year old morbid obese male, from [facility], has past medical hx of ambulatory dysfunction, ACD, bipolar disorder, BPH, CKD, HLD, PVD, COPD not on oxygen, hypothyroidism, RBBB, lymphedema, CHF (last echo 2/22 normal EF, GIDD). Presented for hypotension 73/37 mmHg and bradycardia (40's). Patient admitted to ICU for shock and acute encephalopathy. Now intubated on mechanical ventilation.     DX:  - Shock - septic vs hypovolemia  - Bilateral lower cellulitis   - Acute encephalopathy   - Sinus bradycardia  - HAGMA   - SHEYLA on CKD  - Anemia   - COPD not on oxygen at home   - HLD   - BPH  - Hypothyroidism   - Mood disorder     Plan:  - No further myoclonic activity this morning   - Cont. Keppra and VPA for now  - EEG with out seizures  - Neurology consult noted, recommend prolonged EEG monitoring  - CT head unremarkable   - Awake this morning  - Tolerating SBT  - Extubated today   - Monitor respiratory status   - Cont. antibiotics  - CT scan showing basilar pneumonia   - ID consult appreciated  - Cultures negative thus far   - Monitor renal function  - Viera for urinary obstruction   - HD as per nephrology   - Transfuse 1 unit PRBC  - Tube feedings via NGT   - Cont. home psych medications  - Cont. Levothyroxine  - DVT and stress ulcer prophylaxis  - Cont. ICU care for now

## 2022-12-04 NOTE — PROCEDURE NOTE - NSSITEPREP_SKIN_A_CORE
chlorhexidine
alcohol/chlorhexidine/Adherence to aseptic technique: hand hygiene prior to donning barriers (gown, gloves), don cap and mask, sterile drape over patient

## 2022-12-04 NOTE — PROGRESS NOTE ADULT - SUBJECTIVE AND OBJECTIVE BOX
Podiatry HPI: Patient is seen in ICU bed, is intubated and without CAIR boots, on his way to imaging. Patient is unable to answer questions due toe intubation status.Unable to obtain further history.     HPI:  62 year old morbid obese male, from [facility], has past medical hx of ambulatory dysfunction, ACD, bipolar disorder, BPH, CKD, HLD, PVD,  COPD not on oxygen, hypothyroidism, RBBB, lymphedema, CHF (last echo 2/22 normal EF, GIDD) was BIBEMS for hypotension 73/37 mmHg and bradycardia (40's). Story obtained from ED provider note as patient is not able to provide any history. Patient received 500 cc NS and one dose of atropine for bradycardia. Patient very somnolent during interview but arousable to verbal stimuli.  (30 Nov 2022 18:39)    Patient admits to  (-) Fevers, (-) Chills, (-) Nausea, (-) Vomiting, (-) Shortness of Breath (-) calf pain (-) chest pain     Medications albuterol    90 MICROgram(s) HFA Inhaler 2 Puff(s) Inhalation every 6 hours  azithromycin  IVPB 500 milliGRAM(s) IV Intermittent every 24 hours  cefTRIAXone   IVPB 2000 milliGRAM(s) IV Intermittent every 24 hours  chlorhexidine 0.12% Liquid 15 milliLiter(s) Oral Mucosa every 12 hours  chlorhexidine 2% Cloths 1 Application(s) Topical <User Schedule>  collagenase Ointment 1 Application(s) Topical two times a day  epoetin madyson-epbx (RETACRIT) Injectable 8000 Unit(s) IV Push <User Schedule>  fentaNYL   Infusion. 1 MICROgram(s)/kG/Hr IV Continuous <Continuous>  finasteride 5 milliGRAM(s) Oral daily  heparin   Injectable 5000 Unit(s) SubCutaneous every 8 hours  hydrocortisone sodium succinate Injectable 50 milliGRAM(s) IV Push every 6 hours  levETIRAcetam  IVPB 500 milliGRAM(s) IV Intermittent every 12 hours  levothyroxine 125 MICROGram(s) Oral daily  midazolam Injectable 4 milliGRAM(s) IV Push every 4 hours PRN  mupirocin 2% Ointment 1 Application(s) Both Nostrils two times a day  norepinephrine Infusion 0.05 MICROgram(s)/kG/Min IV Continuous <Continuous>  pantoprazole  Injectable 40 milliGRAM(s) IV Push daily  polyethylene glycol 3350 17 Gram(s) Oral at bedtime  potassium chloride   Powder 40 milliEquivalent(s) Oral once  propofol Infusion 10 MICROgram(s)/kG/Min IV Continuous <Continuous>  risperiDONE   Tablet 2 milliGRAM(s) Oral daily  senna Syrup 10 milliLiter(s) Oral at bedtime  tamsulosin 0.4 milliGRAM(s) Oral at bedtime  valproic  acid Syrup 250 milliGRAM(s) Oral every 6 hours  zinc oxide 20% Ointment 1 Application(s) Topical two times a day    FHNo pertinent family history in first degree relatives    ,   PMHNo pertinent past medical history    Hypothyroid    Hyperlipidemia    Schizophrenia    Schizoaffective disorder    Ambulatory dysfunction    Anemia    History of BPH    CKD (chronic kidney disease)    Chronic obstructive pulmonary disease (COPD)    Bipolar disorder    Bipolar disorder    Mood disorder    HLD (hyperlipidemia)    BPH (benign prostatic hyperplasia)    Anemia    Chronic diastolic congestive heart failure    Lymphedema       PSHNo significant past surgical history    No significant past surgical history        Labs                          7.0    10.09 )-----------( 165      ( 04 Dec 2022 03:50 )             21.6      12-04    144  |  106  |  63<H>  ----------------------------<  128<H>  3.4<L>   |  28  |  3.00<H>    Ca    8.9      04 Dec 2022 03:50  Phos  4.1     12-04  Mg     2.2     12-04    TPro  6.1  /  Alb  1.8<L>  /  TBili  0.2  /  DBili  x   /  AST  20  /  ALT  15  /  AlkPhos  74  12-02     Vital Signs Last 24 Hrs  T(C): 36.6 (04 Dec 2022 04:03), Max: 36.6 (04 Dec 2022 04:03)  T(F): 97.9 (04 Dec 2022 04:03), Max: 97.9 (04 Dec 2022 04:03)  HR: 46 (04 Dec 2022 07:00) (33 - 114)  BP: 116/37 (03 Dec 2022 10:15) (112/40 - 116/37)  BP(mean): 62 (03 Dec 2022 10:15) (62 - 62)  RR: 24 (04 Dec 2022 07:00) (18 - 30)  SpO2: 97% (04 Dec 2022 07:00) (95% - 100%)    Parameters below as of 03 Dec 2022 23:00  Patient On (Oxygen Delivery Method): ventilator    O2 Concentration (%): 40          WBC Count: 10.09 K/uL (12-04-22 @ 03:50)    ROS unremarkable outside of HPI    CONSTITUTIONAL: unremarkable outside of PE      PHYSICAL EXAM  Vasc:  DP and PT pulses non palpable due to b/l edema. TG: warm to warm. Pedal hair absent. Bilateral non pitting edema to LE.   Derm: slightly improved appearance of bilateral extremities on exam. B/L venous stasis changes to skin with hemosiderin deposits. Peau d'orange appearance of skin due to venous stasis, weeping is improved today b/l. Small superficial fibrosis due to venous stasis. SNo purulence or drainage noted at b/l legs on exam. No infected wounds on inspection of b/l lower extremity; no rash noted at b/l legs. Mild IDM noted in all interspaces. Hypertrophic and elongated toenails x 10  Neuro: unable to assess  MSK: unable to assess        A:  Chronic bilateral venous stasis with hemosiderin deposits in skin    P:   Patient evaluated and Chart reviewed, leukocytosis improved  Unable to discuss diagnosis with patient  Inspected b/l extremities for signs of infection, none found  Continue with IV antibiotics As Per ID  Recommend to reapply CAIR boots to avoid pressure ulcers if tolerated by patient   Podiatry to follow while in house.  Outpatient nail care as needed  Seen bedside and discussed with Dr. Ramírez  Discussed with Attending Dr. Navas

## 2022-12-04 NOTE — CHART NOTE - NSCHARTNOTEFT_GEN_A_CORE
[facility] ICUX IC06 D1  GAYLA PEARCE, 62y, Male  [id number]    Patient seen and examined at bedside for removal of central line. Chart and labs reviewed prior to removal, no contraindication to procedure. Area cleaned with alcohol swabs and suture removal kit used to remove sutures holding central line in place. Patient then placed in reverse Trendelenburg position, asked to hold breath/hum, and then central line removed. Pressure applied for 5-10 minutes with gauze. No signs of active bleeding noted. No hematoma formation noted. Tegaderm/gauze/tape used to create pressure dressing to maintain pressure on central line site. Patient tolerated well without complications.   Discussed procedure with RN who will monitor and notify the provider for bleeding, hematoma formation, or other procedural complications.    Sally Vang MD  Department of Medicine  #

## 2022-12-04 NOTE — PROCEDURE NOTE - NSINDICATIONS_GEN_A_CORE
dialysis/CRRT
antibiotic therapy
critical patient/monitoring purposes
critical illness/emergency venous access

## 2022-12-04 NOTE — PROCEDURE NOTE - ADDITIONAL PROCEDURE DETAILS
20g 6cm extended dwell IV (Arrow) placed with sterile technique under ultrasound guidance on 12/4. Confirmed via ultrasound and VBG. Can be used for up to 30 days. Above details and extended dwell policy reviewed in full with primary team NP/Resident and RN.

## 2022-12-04 NOTE — PROCEDURE NOTE - NSINFORMCONSENT_GEN_A_CORE
Benefits, risks, and possible complications of procedure explained to patient/caregiver who verbalized understanding and gave verbal consent.
Germán Rhoades, brother/Benefits, risks, and possible complications of procedure explained to patient/caregiver who verbalized understanding and gave written consent.
Benefits, risks, and possible complications of procedure explained to patient/caregiver who verbalized understanding and gave verbal consent.

## 2022-12-04 NOTE — PROCEDURE NOTE - NSPOSTCAREGUIDE_GEN_A_CORE
Care for catheter as per unit/ICU protocols
no

## 2022-12-05 NOTE — PROGRESS NOTE ADULT - ASSESSMENT
1. SHEYLA due to ATN  -BUN/Scr remain unchanged with fair UO. Since kidney function is not improving at 5s and elevated BUN with acidosis and also underlying CKD stage 4, pt progressed to   ESRD. CT scan shows no hydro. First HD 12/2. s/p shiley on 12/2.   -Patient had HD on Friday and Saturday and will monitor Renal function for recovery otherwise will continue with HD.    -UA shows no infection. f/u urine lytes (uosm, urine sodium, urine creatinine, chloride, potassium), spot protein to creatinine ratio  -Adjust meds to eGFR and avoid IV Gadolinium contrast,NSAIDs, and phosphate enema.  -Monitor I/O's daily.   -Monitor SMA daily.  2. CKD stage 4 most likely due to ischemic nephropathy  -baseline scr around 3.7mg/dL in july 2022.  -Keep patient euvolemic and renal diet  -Avoid Nephrotoxic Meds/ Agents such as (NSAIDs, IV contrast, Aminoglycosides such as gentamicin, -Gadolinium contrast, Phosphate containing enemas, etc..)  -Adjust Medications according to eGFR  3. Hypotension possible sepsis  -bp is stable and off pressor.  4. Mineral Bone Disease:  -phos is improving and PTH intact is 400.  -continue calcitriol 0.25mcg daily and renvela tid once taking oral meds.  5. HAGMA and respiratory acidosis:  -abg noted.   -patient has respiratory acidosis and managed by ICU.   6. Bradycardia due to unclear etiology  -improving heart rate.  -trend troponins  -TSH is nl  7. Pulm:  -s/p intubated on 11/30 for worsening respiratory support.  -continue vent support  -plan as per MICU.   8. Anemia of ESRD:  -iron panel and ferritin is acceptable.   -continue Epogen 8000units IV/SC TIW  -F/u CBC daily  -transfuse if HB < 7.0.  9. Encephalopathy:  -video EEG in progress.         Patient is critically ill. Time Spent: 35mins.  Discussed the assessment and plan with ICU Team/Nurse   1. SHEYLA due to ATN  -BUN/Scr remain unchanged with fair UO. Since kidney function is not improving at 5s and elevated BUN with acidosis and also underlying CKD stage 4, pt seems likely progressed to   ESRD. CT scan shows no hydro. First HD 12/2. s/p shiley on 12/2.   -Patient had HD on Friday and Saturday and will monitor Renal function for recovery otherwise will continue with HD if no improvement.   -UA shows no infection. f/u urine lytes (uosm, urine sodium, urine creatinine, chloride, potassium), spot protein to creatinine ratio  -Adjust meds to eGFR and avoid IV Gadolinium contrast,NSAIDs, and phosphate enema.  -Monitor I/O's daily.   -Monitor SMA daily.  2. CKD stage 4 most likely due to ischemic nephropathy  -baseline scr around 3.7mg/dL in july 2022.  -Keep patient euvolemic and renal diet  -Avoid Nephrotoxic Meds/ Agents such as (NSAIDs, IV contrast, Aminoglycosides such as gentamicin, -Gadolinium contrast, Phosphate containing enemas, etc..)  -Adjust Medications according to eGFR  3. Hypotension possible sepsis  -bp is stable and off pressor.  4. Mineral Bone Disease:  -phos is improving and PTH intact is 400.  -continue calcitriol 0.25mcg daily and renvela tid once taking oral meds.  5. HAGMA and respiratory acidosis:  -abg noted.   -patient has respiratory acidosis and managed by ICU.   6. Bradycardia due to unclear etiology  -improving heart rate.  -trend troponins  -TSH is nl  7. Pulm:  -s/p intubated on 11/30 for worsening respiratory support.  -continue vent support  -plan as per MICU.   8. Anemia of ESRD:  -iron panel and ferritin is acceptable.   -continue Epogen 8000units IV/SC TIW  -F/u CBC daily  -transfuse if HB < 7.0.  9. Encephalopathy:  -video EEG in progress.         Patient is critically ill. Time Spent: 35mins.  Discussed the assessment and plan with ICU Team/Nurse

## 2022-12-05 NOTE — PROGRESS NOTE ADULT - SUBJECTIVE AND OBJECTIVE BOX
NEPHROLOGY MEDICAL CARE, [facility] - Dr. Taj Valenzuela/ Dr. Hazel Solis/ Dr. Comso Root/ Dr. Fang Gifford    Patient was seen and examined at bedside.    CC: patient was extubated over the weekend  patient still confused and on HFNC    Vital Signs Last 24 Hrs  T(C): 36.2 (05 Dec 2022 07:00), Max: 36.4 (04 Dec 2022 15:20)  T(F): 97.2 (05 Dec 2022 07:00), Max: 97.5 (04 Dec 2022 15:20)  HR: 94 (05 Dec 2022 11:59) (76 - 108)  BP: 116/57 (05 Dec 2022 11:00) (97/56 - 134/77)  BP(mean): 74 (05 Dec 2022 11:00) (71 - 95)  RR: 20 (05 Dec 2022 11:59) (10 - 34)  SpO2: 94% (05 Dec 2022 11:59) (83% - 100%)    Parameters below as of 05 Dec 2022 11:59  Patient On (Oxygen Delivery Method): nasal cannula, high flow  O2 Flow (L/min): 30  O2 Concentration (%): 60    12-04 @ 07:01  -  12-05 @ 07:00  --------------------------------------------------------  IN: 1597 mL / OUT: 1580 mL / NET: 17 mL    12-05 @ 07:01  -  12-05 @ 12:51  --------------------------------------------------------  IN: 330 mL / OUT: 0 mL / NET: 330 mL        PHYSICAL EXAM:  General: NAD on HFNC; obese.  Eyes: conjunctiva and sclera clear  ENMT: Atraumatic, Normocephalic  Respiratory: Bilateral poor air entry  Cardiovascular: S1S2+; no m/r/g  Gastrointestinal: Soft, Non-tender, Nondistended; Bowel sounds present,   : dlaey's cath with yellowish urine.  Neuro:  confused.  Ext:  vascular skin changes of both legs; No Cyanosis  Skin: No visible rashes  Dialysis Access: Rt femoral shiley    MEDICATIONS:  MEDICATIONS  (STANDING):  albuterol    90 MICROgram(s) HFA Inhaler 2 Puff(s) Inhalation every 6 hours  azithromycin  IVPB 500 milliGRAM(s) IV Intermittent every 24 hours  cefTRIAXone   IVPB 2000 milliGRAM(s) IV Intermittent every 24 hours  chlorhexidine 2% Cloths 1 Application(s) Topical <User Schedule>  collagenase Ointment 1 Application(s) Topical two times a day  epoetin madyson-epbx (RETACRIT) Injectable 8000 Unit(s) IV Push <User Schedule>  finasteride 5 milliGRAM(s) Oral daily  heparin   Injectable 5000 Unit(s) SubCutaneous every 8 hours  levETIRAcetam  IVPB 500 milliGRAM(s) IV Intermittent every 12 hours  levothyroxine 125 MICROGram(s) Oral daily  mupirocin 2% Ointment 1 Application(s) Both Nostrils two times a day  pantoprazole   Suspension 40 milliGRAM(s) Oral daily  polyethylene glycol 3350 17 Gram(s) Oral at bedtime  risperiDONE   Tablet 2 milliGRAM(s) Oral daily  senna Syrup 10 milliLiter(s) Oral at bedtime  sevelamer carbonate Powder 800 milliGRAM(s) Enteral Tube three times a day  tamsulosin 0.4 milliGRAM(s) Oral at bedtime  valproic  acid Syrup 750 milliGRAM(s) Oral every 6 hours  zinc oxide 20% Ointment 1 Application(s) Topical two times a day    MEDICATIONS  (PRN):  bisacodyl Suppository 10 milliGRAM(s) Rectal daily PRN Constipation          LABS:                        7.9    10.64 )-----------( 199      ( 05 Dec 2022 05:09 )             26.2     12-05    144  |  109<H>  |  68<H>  ----------------------------<  94  3.9   |  30  |  3.34<H>    Ca    8.9      05 Dec 2022 05:09  Phos  4.9     12-05  Mg     2.4     12-05          Magnesium, Serum: 2.4 mg/dL (12-05 @ 05:09)  Phosphorus Level, Serum: 4.9 mg/dL (12-05 @ 05:09)  Magnesium, Serum: 2.3 mg/dL (12-04 @ 14:25)  Phosphorus Level, Serum: 4.9 mg/dL (12-04 @ 14:25)    Urine studies    PTH and Vit D:

## 2022-12-05 NOTE — EEG REPORT - NS EEG TEXT BOX
[facility]  Comprehensive Epilepsy Center  Report of Continuous Video EEG    [facility]: [address], [place] [address], Phone [phone]  [place] Office: [address], [address], [place] [address] Phone [phone]    [facility]: [address] [place] [address], Phone [phone]  [place] Office: [address] [place] [address], Phone [phone]    Patient Name: GAYLA PEARCE    Age: 62 year, : 1960  Patient ID: -, MRN #: -, Braden: - -  Referring Physician: -  EEG #:     Study Time/Date: 7:12:31 PM on 2022  	  End Time/Date: 0800 on 2022          			   Duration: 12H 40min    Study Information:    EEG Recording Technique:  The patient underwent continuous Video-EEG monitoring, using Telemetry System hardware on the XLTek Digital System. EEG and video data were stored on a computer hard drive with important events saved in digital archive files. The material was reviewed by a physician (electroencephalographer / epileptologist) on a daily basis. Costa and seizure detection algorithms were utilized and reviewed. An EEG Technician attended to the patient, and was available throughout daytime work hours.  The epilepsy center neurologist was available in person or on call 24-hours per day.    EEG Placement and Labeling of Electrodes:  The EEG was performed utilizing 20 channel referential EEG connections (coronal over temporal over parasagittal montage) using all standard 10-20 electrode placements, with additional electrodes placed in the inferior temporal region using the modified 10-10 montage electrode placements for elective admissions, or if deemed necessary. Recording was at a sampling rate of 256 samples per second per channel. Time synchronized digital video recording was done simultaneously with EEG recording. A low light infrared camera was used for low light recording.     History:   VEG DAY 1  HR 77  61 Y/O male presents with:  Upper extremity twitching  R/O seizure  Bipolar disorder  COPD          Pertinent Medication  Keppra (Levetiracetam)    Interpretation:    Daily EEG Visual Analysis  Findings: The background was continuous and reactive. During wakefulness, the posterior dominant rhythm consisted of symmetric, 7 Hz activity, with amplitude to 30 uV, that attenuated to eye opening.     Background Slowing:  Diffuse theta and polymorphic delta slowing.    Focal Slowing:   None were present.    Sleep Background:  Drowsiness was characterized by fragmentation, attenuation, and slowing of the background activity.    Sleep was characterized by the presence of vertex waves, symmetric sleep spindles and K-complexes.    Other Non-Epileptiform Findings:  None were present.    Interictal Epileptiform Activity:   None were present.    Events:  Clinical events: None recorded.  Seizures: None recorded.    Activation Procedures:   Hyperventilation was not performed.    Photic stimulation was not performed.     Artifacts:  Intermittent myogenic and movement artifacts were noted.    EEG Summary / Classification:  Abnormal EEG   - Moderate generalized slowing.    EEG Impression / Clinical Correlate:  Abnormal EEG study.  Moderate nonspecific diffuse or multifocal cerebral dysfunction.   No epileptiform pattern or seizure seen.    Consider discontinuation of EEG unless strong clinical concern persists.  ________________________________________  Feliciano Esquivel MD  Attending Physician, [facility]ile[facility] [place]   [facility]  Comprehensive Epilepsy Center  Report of Continuous Video EEG    [facility]: [address], [place] [address], Phone [phone]  [place] Office: [address], [address], [place] [address] Phone [phone]    [facility]: [address] [place] [address], Phone [phone]  [place] Office: [address] [place] [address], Phone [phone]    Patient Name: GAYLA PEARCE    Age: 62 year, : 1960  Patient ID: -, MRN #: -, Braden: - -  Referring Physician: -  EEG #:     Study Time/Date: 7:12:31 PM on 2022  	  End Time/Date: 1530 on 2022          			   Duration: 20H 20min    Study Information:    EEG Recording Technique:  The patient underwent continuous Video-EEG monitoring, using Telemetry System hardware on the XLTek Digital System. EEG and video data were stored on a computer hard drive with important events saved in digital archive files. The material was reviewed by a physician (electroencephalographer / epileptologist) on a daily basis. Costa and seizure detection algorithms were utilized and reviewed. An EEG Technician attended to the patient, and was available throughout daytime work hours.  The epilepsy center neurologist was available in person or on call 24-hours per day.    EEG Placement and Labeling of Electrodes:  The EEG was performed utilizing 20 channel referential EEG connections (coronal over temporal over parasagittal montage) using all standard 10-20 electrode placements, with additional electrodes placed in the inferior temporal region using the modified 10-10 montage electrode placements for elective admissions, or if deemed necessary. Recording was at a sampling rate of 256 samples per second per channel. Time synchronized digital video recording was done simultaneously with EEG recording. A low light infrared camera was used for low light recording.     History:   VEG DAY 1  HR 77  63 Y/O male presents with:  Upper extremity twitching  R/O seizure  Bipolar disorder  COPD          Pertinent Medication  Keppra (Levetiracetam)    Interpretation:    Daily EEG Visual Analysis  Findings: The background was continuous and reactive. During wakefulness, the posterior dominant rhythm consisted of symmetric, 7 Hz activity, with amplitude to 30 uV, that attenuated to eye opening.     Background Slowing:  Diffuse theta and polymorphic delta slowing.    Focal Slowing:   None were present.    Sleep Background:  Drowsiness was characterized by fragmentation, attenuation, and slowing of the background activity.    Sleep was characterized by the presence of vertex waves, symmetric sleep spindles and K-complexes.    Other Non-Epileptiform Findings:  None were present.    Interictal Epileptiform Activity:   None were present.    Events:  Clinical events: None recorded.  Seizures: None recorded.    Activation Procedures:   Hyperventilation was not performed.    Photic stimulation was not performed.     Artifacts:  Intermittent myogenic and movement artifacts were noted.    EEG Summary / Classification:  Abnormal EEG   - Moderate generalized slowing.    EEG Impression / Clinical Correlate:  Abnormal EEG study.  Moderate nonspecific diffuse or multifocal cerebral dysfunction.   No epileptiform pattern or seizure seen.    Consider discontinuation of EEG unless strong clinical concern persists.  ________________________________________  Feliciano Esquivel MD  Attending Physician, [facility] Epilepsy [place]

## 2022-12-05 NOTE — PROGRESS NOTE ADULT - SUBJECTIVE AND OBJECTIVE BOX
Podiatry HPI: Patient is seen in ICU bed, no longer intubated. Patient is not wearing CAIR boots. Patient is unable to answer questions, not conscious. Unable to obtain further history.     HPI:  62 year old morbid obese male, from [facility], has past medical hx of ambulatory dysfunction, ACD, bipolar disorder, BPH, CKD, HLD, PVD,  COPD not on oxygen, hypothyroidism, RBBB, lymphedema, CHF (last echo 2/22 normal EF, GIDD) was BIBEMS for hypotension 73/37 mmHg and bradycardia (40's). Story obtained from ED provider note as patient is not able to provide any history. Patient received 500 cc NS and one dose of atropine for bradycardia. Patient very somnolent during interview but arousable to verbal stimuli.  (30 Nov 2022 18:39)    Patient admits to  (-) Fevers, (-) Chills, (-) Nausea, (-) Vomiting, (-) Shortness of Breath (-) calf pain (-) chest pain     Medications albuterol    0.083% 2.5 milliGRAM(s) Nebulizer every 6 hours  albuterol    90 MICROgram(s) HFA Inhaler 1 Puff(s) Inhalation every 4 hours  azithromycin  IVPB 500 milliGRAM(s) IV Intermittent every 24 hours  bisacodyl Suppository 10 milliGRAM(s) Rectal daily PRN  cefTRIAXone   IVPB 2000 milliGRAM(s) IV Intermittent every 24 hours  chlorhexidine 2% Cloths 1 Application(s) Topical <User Schedule>  collagenase Ointment 1 Application(s) Topical two times a day  epoetin madyson-epbx (RETACRIT) Injectable 8000 Unit(s) IV Push <User Schedule>  finasteride 5 milliGRAM(s) Oral daily  heparin   Injectable 5000 Unit(s) SubCutaneous every 8 hours  levETIRAcetam  IVPB 500 milliGRAM(s) IV Intermittent every 12 hours  levothyroxine 125 MICROGram(s) Oral daily  mupirocin 2% Ointment 1 Application(s) Both Nostrils two times a day  pantoprazole   Suspension 40 milliGRAM(s) Oral daily  polyethylene glycol 3350 17 Gram(s) Oral at bedtime  risperiDONE   Tablet 2 milliGRAM(s) Oral daily  senna Syrup 10 milliLiter(s) Oral at bedtime  sevelamer carbonate Powder 800 milliGRAM(s) Enteral Tube three times a day  tamsulosin 0.4 milliGRAM(s) Oral at bedtime  valproic  acid Syrup 750 milliGRAM(s) Oral every 6 hours  zinc oxide 20% Ointment 1 Application(s) Topical two times a day    FHNo pertinent family history in first degree relatives    ,   PMHNo pertinent past medical history    Hypothyroid    Hyperlipidemia    Schizophrenia    Schizoaffective disorder    Ambulatory dysfunction    Anemia    History of BPH    CKD (chronic kidney disease)    Chronic obstructive pulmonary disease (COPD)    Bipolar disorder    Bipolar disorder    Mood disorder    HLD (hyperlipidemia)    BPH (benign prostatic hyperplasia)    Anemia    Chronic diastolic congestive heart failure    Lymphedema       PSHNo significant past surgical history    No significant past surgical history        Labs                          7.9    10.64 )-----------( 199      ( 05 Dec 2022 05:09 )             26.2      12-05    144  |  109<H>  |  68<H>  ----------------------------<  94  3.9   |  30  |  3.34<H>    Ca    8.9      05 Dec 2022 05:09  Phos  4.9     12-05  Mg     2.4     12-05       Vital Signs Last 24 Hrs  T(C): 36.2 (05 Dec 2022 07:00), Max: 36.4 (04 Dec 2022 15:20)  T(F): 97.2 (05 Dec 2022 07:00), Max: 97.5 (04 Dec 2022 15:20)  HR: 83 (05 Dec 2022 13:00) (76 - 101)  BP: 101/55 (05 Dec 2022 13:00) (97/56 - 134/77)  BP(mean): 66 (05 Dec 2022 13:00) (65 - 95)  RR: 18 (05 Dec 2022 13:00) (10 - 20)  SpO2: 93% (05 Dec 2022 13:00) (93% - 100%)    Parameters below as of 05 Dec 2022 13:00  Patient On (Oxygen Delivery Method): nasal cannula, high flow  O2 Flow (L/min): 30  O2 Concentration (%): 60          WBC Count: 10.64 K/uL *H* (12-05-22 @ 05:09)  WBC Count: 11.90 K/uL *H* (12-04-22 @ 14:25)        PHYSICAL EXAM  Vasc:  DP and PT pulses non palpable due to b/l edema. TG: warm to warm. Pedal hair absent. Bilateral non pitting edema to LE.   Derm: slightly improved appearance of bilateral extremities on exam. B/L venous stasis changes to skin with hemosiderin deposits. Peau d'orange appearance of skin due to venous stasis, weeping is improved today b/l. Small superficial fibrosis due to venous stasis. SNo purulence or drainage noted at b/l legs on exam. No infected wounds on inspection of b/l lower extremity; no rash noted at b/l legs. Mild IDM noted in all interspaces. Hypertrophic and elongated toenails x 10  Neuro: unable to assess  MSK: unable to assess        A:  Chronic bilateral venous stasis with hemosiderin deposits in skin    P:   Patient evaluated and Chart reviewed  Unable to discuss diagnosis with patient  Inspected b/l extremities for signs of infection, none found  Continue with IV antibiotics As Per ID  Recommend to reapply CAIR boots to avoid pressure ulcers if tolerated by patient   Podiatry to follow while in house.  Outpatient nail care as needed  Discussed with Attending Dr. Navas   DISPLAY PLAN FREE TEXT

## 2022-12-05 NOTE — PROGRESS NOTE ADULT - SUBJECTIVE AND OBJECTIVE BOX
NEUROLOGY FOLLOW-UP NOTE    NAME:  GAYLA PEARCE      ASSESSMENT:  62M with recurrent jerks in the setting of end-stage renal disease on hemodialysis and shock, concerning for myoclonic jerks, less likely epileptic seizures given absence of seizures on continuous EEG      RECOMMENDATIONS:    - Continuous EEG does not reveal subclinical seizures - This study can be discontinued this afternoon    - Continue Levetiracetam 500mg PO BID to manage myoclonic jerks    - Increase Valproic acid syrup dosage from 650mg to 750mg PO QID given low valproic acid level    - Recheck Valproic acid level tomorrow morning    - Respiratory management and infectious workup as per primary team    - DVT ppx: SCDs, Heparin            NOTE TO BE COMPLETED - PLEASE REFER TO ABOVE ONLY AND IGNORE INFORMATION BELOW    ******************************    HPI:  62 year old morbid obese male, from [facility], has past medical hx of ambulatory dysfunction, ACD, bipolar disorder, BPH, CKD, HLD, PVD,  COPD not on oxygen, hypothyroidism, RBBB, lymphedema, CHF (last echo 2/22 normal EF, GIDD) was BIBEMS for hypotension 73/37 mmHg and bradycardia (40's). Story obtained from ED provider note as patient is not able to provide any history. Patient received 500 cc NS and one dose of atropine for bradycardia. Patient very somnolent during interview but arousable to verbal stimuli.  (30 Nov 2022 18:39)      NEURO HPI:      INTERVAL HISTORY:      MEDICATIONS:  albuterol    0.083% 2.5 milliGRAM(s) Nebulizer every 6 hours  albuterol    90 MICROgram(s) HFA Inhaler 1 Puff(s) Inhalation every 4 hours  azithromycin  IVPB 500 milliGRAM(s) IV Intermittent every 24 hours  bisacodyl Suppository 10 milliGRAM(s) Rectal daily PRN  cefTRIAXone   IVPB 2000 milliGRAM(s) IV Intermittent every 24 hours  chlorhexidine 2% Cloths 1 Application(s) Topical <User Schedule>  collagenase Ointment 1 Application(s) Topical two times a day  epoetin madyson-epbx (RETACRIT) Injectable 8000 Unit(s) IV Push <User Schedule>  finasteride 5 milliGRAM(s) Oral daily  heparin   Injectable 5000 Unit(s) SubCutaneous every 8 hours  levETIRAcetam  IVPB 500 milliGRAM(s) IV Intermittent every 12 hours  levothyroxine 125 MICROGram(s) Oral daily  mupirocin 2% Ointment 1 Application(s) Both Nostrils two times a day  pantoprazole   Suspension 40 milliGRAM(s) Oral daily  polyethylene glycol 3350 17 Gram(s) Oral at bedtime  risperiDONE   Tablet 2 milliGRAM(s) Oral daily  senna Syrup 10 milliLiter(s) Oral at bedtime  sevelamer carbonate Powder 800 milliGRAM(s) Enteral Tube three times a day  tamsulosin 0.4 milliGRAM(s) Oral at bedtime  valproic  acid Syrup 750 milliGRAM(s) Oral every 6 hours  zinc oxide 20% Ointment 1 Application(s) Topical two times a day      ALLERGIES:  No Known Allergies      REVIEW OF SYSTEMS:  Fourteen systems reviewed and negative except as in HPI / Interval History.        OBJECTIVE:  Vital Signs Last 24 Hrs  T(C): 36.2 (05 Dec 2022 07:00), Max: 36.4 (04 Dec 2022 15:20)  T(F): 97.2 (05 Dec 2022 07:00), Max: 97.5 (04 Dec 2022 15:20)  HR: 94 (05 Dec 2022 11:59) (76 - 101)  BP: 116/57 (05 Dec 2022 11:00) (97/56 - 134/77)  BP(mean): 74 (05 Dec 2022 11:00) (71 - 95)  RR: 20 (05 Dec 2022 11:59) (10 - 20)  SpO2: 94% (05 Dec 2022 11:59) (93% - 100%)    Parameters below as of 05 Dec 2022 11:59  Patient On (Oxygen Delivery Method): nasal cannula, high flow  O2 Flow (L/min): 30  O2 Concentration (%): 60    General Examination:  General: No acute distress  HEENT: Atraumatic, Normocephalic  Respiratory: CTA B/l.  No crackles, rhonchi, or wheezes.  Cardiovascular: RRR.  Normal S1 & S2.  Normal b/l radial and pedal pulses.    Neurological Examination:  General / Mental Status: AAO x 3.  No aphasia or dysarthria.  Naming and repetition intact.  Cranial Nerves: VFF x 4.  PERRL.  EOMI x 2, No nystagmus or diplopia.  B/l V1-V3 equal and intact to light touch and pinprick.  Symmetric facial movement and palate elevation.  B/l hearing equal to finger rub.  5/5 strength with b/l sternocleidomastoid & trapezius.  Midline tongue protrusion, with no atrophy or fasciculations.  Motor: Normal bulk & tone in all four extremities.  5/5 strength throughout all four extremities.  No downward drift, rigidity, spasticity, or tremors in any of the four extremities.  Sensory: Intact to light touch and pinprick in all four extremities.  Negative Romberg.  Reflex: 2+ and symmetric at b/l biceps, triceps, brachioradialis, patellae, and ankles.  Downgoing toes b/l.  Coordination: No dysmetria with b/l finger-to-nose and heel raise tests.  Symmetric rapid alternating movements b/l.  Gait: Normal, narrow-based gait.  No difficulty with tiptoe, heel, and tandem gaits.        LABORATORY VALUES:                          7.9    10.64 )-----------( 199      ( 05 Dec 2022 05:09 )             26.2       12-05    144  |  109<H>  |  68<H>  ----------------------------<  94  3.9   |  30  |  3.34<H>    Ca    8.9      05 Dec 2022 05:09  Phos  4.9     12-05  Mg     2.4     12-05                Glucose Trend  12-05-22 @ 05:09   -  -- 94 --  12-05-22 @ 00:10   -  -- -- 88  12-04-22 @ 14:25   -  -- 105<H> --  12-04-22 @ 03:50   -  -- 128<H> --  12-03-22 @ 22:32   -  -- -- 138<H>  12-03-22 @ 14:14   -  -- -- 99  12-03-22 @ 04:54   -  -- 115<H> --  12-02-22 @ 19:09   -  -- 131<H> --                    NEUROIMAGING:          Please contact the Neurology consult service with any neurological questions.      Eric Rose MD   of Neurology  [facility]         NEUROLOGY FOLLOW-UP NOTE    NAME:  GAYLA PEARCE      ASSESSMENT:  62M with recurrent jerks in the setting of end-stage renal disease on hemodialysis and shock, concerning for myoclonic jerks, less likely epileptic seizures given absence of seizures on continuous EEG      RECOMMENDATIONS:    - Continuous EEG does not reveal subclinical seizures - This study can be discontinued this afternoon    - Continue Levetiracetam 500mg PO BID to manage myoclonic jerks    - Increase Valproic acid syrup dosage from 650mg to 750mg PO QID given low valproic acid level    - Recheck Valproic acid level tomorrow morning    - Respiratory management and infectious workup as per primary team    - DVT ppx: SCDs, Heparin          ******************************    HPI:  62 year old morbid obese male, from [facility], has past medical hx of ambulatory dysfunction, ACD, bipolar disorder, BPH, CKD, HLD, PVD,  COPD not on oxygen, hypothyroidism, RBBB, lymphedema, CHF (last echo 2/22 normal EF, GIDD) was BIBEMS for hypotension 73/37 mmHg and bradycardia (40's). Story obtained from ED provider note as patient is not able to provide any history. Patient received 500 cc NS and one dose of atropine for bradycardia. Patient very somnolent during interview but arousable to verbal stimuli.  (30 Nov 2022 18:39)      NEURO HPI:  62 LHM presenting after experiencing bradycardia and hypotension, and then observed to have shaking of both arms and hands while being managed in the ICU.      INTERVAL HISTORY:  The patient's sedation has been stopped and he has had decreased frequency of shaking movements in both arms overnight.      MEDICATIONS:  albuterol    0.083% 2.5 milliGRAM(s) Nebulizer every 6 hours  albuterol    90 MICROgram(s) HFA Inhaler 1 Puff(s) Inhalation every 4 hours  azithromycin  IVPB 500 milliGRAM(s) IV Intermittent every 24 hours  bisacodyl Suppository 10 milliGRAM(s) Rectal daily PRN  cefTRIAXone   IVPB 2000 milliGRAM(s) IV Intermittent every 24 hours  chlorhexidine 2% Cloths 1 Application(s) Topical <User Schedule>  collagenase Ointment 1 Application(s) Topical two times a day  epoetin madyson-epbx (RETACRIT) Injectable 8000 Unit(s) IV Push <User Schedule>  finasteride 5 milliGRAM(s) Oral daily  heparin   Injectable 5000 Unit(s) SubCutaneous every 8 hours  levETIRAcetam  IVPB 500 milliGRAM(s) IV Intermittent every 12 hours  levothyroxine 125 MICROGram(s) Oral daily  mupirocin 2% Ointment 1 Application(s) Both Nostrils two times a day  pantoprazole   Suspension 40 milliGRAM(s) Oral daily  polyethylene glycol 3350 17 Gram(s) Oral at bedtime  risperiDONE   Tablet 2 milliGRAM(s) Oral daily  senna Syrup 10 milliLiter(s) Oral at bedtime  sevelamer carbonate Powder 800 milliGRAM(s) Enteral Tube three times a day  tamsulosin 0.4 milliGRAM(s) Oral at bedtime  valproic  acid Syrup 750 milliGRAM(s) Oral every 6 hours  zinc oxide 20% Ointment 1 Application(s) Topical two times a day      ALLERGIES:  No Known Allergies      REVIEW OF SYSTEMS:  Fourteen systems reviewed and negative except as in HPI / Interval History.        OBJECTIVE:  Vital Signs Last 24 Hrs  T(C): 36.2 (05 Dec 2022 07:00), Max: 36.4 (04 Dec 2022 15:20)  T(F): 97.2 (05 Dec 2022 07:00), Max: 97.5 (04 Dec 2022 15:20)  HR: 94 (05 Dec 2022 11:59) (76 - 101)  BP: 116/57 (05 Dec 2022 11:00) (97/56 - 134/77)  BP(mean): 74 (05 Dec 2022 11:00) (71 - 95)  RR: 20 (05 Dec 2022 11:59) (10 - 20)  SpO2: 94% (05 Dec 2022 11:59) (93% - 100%)  Parameters below as of 05 Dec 2022 11:59  Patient On (Oxygen Delivery Method): nasal cannula, high flow  O2 Flow (L/min): 30  O2 Concentration (%): 60    General Exam:  General: Intubated  Respiratory: Rapid rate, Diminished breath sounds b/l  Cardiovascular: RRR, Weak b/l radial and pedal pulses    Neurological Exam:  General / Mental Status: Intubated, Nonverbal, Does not follow commands.  Neurological exam is limited.  Cranial Nerves: Pupils are constricted at baseline with no further reaction to light.  Blink to threat absent b/l, Does not track finger movements with eyes b/l.  No response to pinprick at b/l V1-V3 nor to snap at b/l ears.  No apparent facial asymmetry.  Midline palate and tongue.  No resistance to passive motion of neck b/l.  Motor: Diminished bulk and tone in all four extremities.  All four extremities drop to bed after passive elevation.  No arm jerking movements observed during this examination.  Sensation: Weak withdrawal to nailbed pressure in all four extremities.  Reflexes: 1+ and symmetric at b/l biceps, triceps, brachioradialis, patellae, and ankles.  Toes mute b/l.  Unable to assess coordination, Romberg sign, or gait in unresponsive patient.          LABORATORY VALUES:                          7.9    10.64 )-----------( 199      ( 05 Dec 2022 05:09 )             26.2       12-05    144  |  109<H>  |  68<H>  ----------------------------<  94  3.9   |  30  |  3.34<H>    Ca    8.9      05 Dec 2022 05:09  Phos  4.9     12-05  Mg     2.4     12-05      Glucose Trend  12-05-22 @ 05:09   -  -- 94 --  12-05-22 @ 00:10   -  -- -- 88  12-04-22 @ 14:25   -  -- 105<H> --  12-04-22 @ 03:50   -  -- 128<H> --  12-03-22 @ 22:32   -  -- -- 138<H>  12-03-22 @ 14:14   -  -- -- 99  12-03-22 @ 04:54   -  -- 115<H> --  12-02-22 @ 19:09   -  -- 131<H> --          NEUROIMAGING:        CT Head (12/1/22):  - No acute intracranial abnormality  - Chronic microvascular changes  - Mild diffuse atrophy  - Incidental inflammatory changes in sinuses      Routine EEG (12/3/22):  - Moderate generalized background slowing  - No seizure tendency identified      Routine & Continuous EEG (12/4/22 - 12/5/22):  - Mild/Moderate generalized background slowing  - No seizure tendency identified          Please contact the Neurology consult service with any neurological questions.      Eric Rose MD   of Neurology  [facility] at [facility]         NEUROLOGY FOLLOW-UP NOTE    NAME:  GAYLA PEARCE      ASSESSMENT:  62M with recurrent jerks in the setting of end-stage renal disease on hemodialysis and shock, concerning for myoclonic jerks, less likely epileptic seizures given absence of seizures on continuous EEG      RECOMMENDATIONS:    - Continuous EEG does not reveal subclinical seizures - This study can be discontinued this afternoon    - Continue Levetiracetam 500mg PO BID to manage myoclonic jerks    - Increase Valproic acid syrup dosage from 650mg to 750mg PO QID given low valproic acid level    - Recheck Valproic acid level tomorrow morning    - Respiratory management and infectious workup as per primary team    - DVT ppx: SCDs, Heparin          ******************************    HPI:  62 year old morbid obese male, from [facility], has past medical hx of ambulatory dysfunction, ACD, bipolar disorder, BPH, CKD, HLD, PVD,  COPD not on oxygen, hypothyroidism, RBBB, lymphedema, CHF (last echo 2/22 normal EF, GIDD) was BIBEMS for hypotension 73/37 mmHg and bradycardia (40's). Story obtained from ED provider note as patient is not able to provide any history. Patient received 500 cc NS and one dose of atropine for bradycardia. Patient very somnolent during interview but arousable to verbal stimuli.  (30 Nov 2022 18:39)      NEURO HPI:  62 LHM presenting after experiencing bradycardia and hypotension, and then observed to have shaking of both arms and hands while being managed in the ICU.      INTERVAL HISTORY:  The patient's sedation has been stopped and he has had decreased frequency of shaking movements in both arms overnight.      MEDICATIONS:  albuterol    0.083% 2.5 milliGRAM(s) Nebulizer every 6 hours  albuterol    90 MICROgram(s) HFA Inhaler 1 Puff(s) Inhalation every 4 hours  azithromycin  IVPB 500 milliGRAM(s) IV Intermittent every 24 hours  bisacodyl Suppository 10 milliGRAM(s) Rectal daily PRN  cefTRIAXone   IVPB 2000 milliGRAM(s) IV Intermittent every 24 hours  chlorhexidine 2% Cloths 1 Application(s) Topical <User Schedule>  collagenase Ointment 1 Application(s) Topical two times a day  epoetin madyson-epbx (RETACRIT) Injectable 8000 Unit(s) IV Push <User Schedule>  finasteride 5 milliGRAM(s) Oral daily  heparin   Injectable 5000 Unit(s) SubCutaneous every 8 hours  levETIRAcetam  IVPB 500 milliGRAM(s) IV Intermittent every 12 hours  levothyroxine 125 MICROGram(s) Oral daily  mupirocin 2% Ointment 1 Application(s) Both Nostrils two times a day  pantoprazole   Suspension 40 milliGRAM(s) Oral daily  polyethylene glycol 3350 17 Gram(s) Oral at bedtime  risperiDONE   Tablet 2 milliGRAM(s) Oral daily  senna Syrup 10 milliLiter(s) Oral at bedtime  sevelamer carbonate Powder 800 milliGRAM(s) Enteral Tube three times a day  tamsulosin 0.4 milliGRAM(s) Oral at bedtime  valproic  acid Syrup 750 milliGRAM(s) Oral every 6 hours  zinc oxide 20% Ointment 1 Application(s) Topical two times a day      ALLERGIES:  No Known Allergies      REVIEW OF SYSTEMS:  Fourteen systems reviewed and negative except as in HPI / Interval History.        OBJECTIVE:  Vital Signs Last 24 Hrs  T(C): 36.2 (05 Dec 2022 07:00), Max: 36.4 (04 Dec 2022 15:20)  T(F): 97.2 (05 Dec 2022 07:00), Max: 97.5 (04 Dec 2022 15:20)  HR: 94 (05 Dec 2022 11:59) (76 - 101)  BP: 116/57 (05 Dec 2022 11:00) (97/56 - 134/77)  BP(mean): 74 (05 Dec 2022 11:00) (71 - 95)  RR: 20 (05 Dec 2022 11:59) (10 - 20)  SpO2: 94% (05 Dec 2022 11:59) (93% - 100%)  Parameters below as of 05 Dec 2022 11:59  Patient On (Oxygen Delivery Method): nasal cannula, high flow  O2 Flow (L/min): 30  O2 Concentration (%): 60    General Exam:  General: Intubated  Respiratory: Rapid rate, Diminished breath sounds b/l  Cardiovascular: RRR, Weak b/l radial and pedal pulses    Neurological Exam:  General / Mental Status: Intubated, Nonverbal, Does not follow commands.  Neurological exam is limited.  Cranial Nerves: Pupils are constricted at baseline with no further reaction to light.  Blink to threat absent b/l, Does not track finger movements with eyes b/l.  No response to pinprick at b/l V1-V3 nor to snap at b/l ears.  No apparent facial asymmetry.  Midline palate and tongue.  No resistance to passive motion of neck b/l.  Motor: Diminished bulk and tone in all four extremities.  All four extremities drop to bed after passive elevation.  No arm jerking movements observed during this examination.  Sensation: Weak withdrawal to nailbed pressure in all four extremities.  Reflexes: 1+ and symmetric at b/l biceps, triceps, brachioradialis, patellae, and ankles.  Toes mute b/l.  Unable to assess coordination, Romberg sign, or gait in unresponsive patient.          LABORATORY VALUES:                          7.9    10.64 )-----------( 199      ( 05 Dec 2022 05:09 )             26.2       12-05    144  |  109<H>  |  68<H>  ----------------------------<  94  3.9   |  30  |  3.34<H>    Ca    8.9      05 Dec 2022 05:09  Phos  4.9     12-05  Mg     2.4     12-05      Glucose Trend  12-05-22 @ 05:09   -  -- 94 --  12-05-22 @ 00:10   -  -- -- 88  12-04-22 @ 14:25   -  -- 105<H> --  12-04-22 @ 03:50   -  -- 128<H> --  12-03-22 @ 22:32   -  -- -- 138<H>  12-03-22 @ 14:14   -  -- -- 99  12-03-22 @ 04:54   -  -- 115<H> --  12-02-22 @ 19:09   -  -- 131<H> --          NEUROIMAGING:        CT Head (12/1/22):  - No acute intracranial abnormality  - Chronic microvascular changes  - Mild diffuse atrophy  - Incidental inflammatory changes in sinuses      Routine EEG (12/3/22):  - Moderate generalized background slowing  - No seizure tendency identified      Repeat CT Head (12/4/22):  - No acute intracranial abnormality  - Mild chronic microvascular changes  - Mild diffuse atrophy      Routine & Continuous EEG (12/4/22 - 12/5/22):  - Mild/Moderate generalized background slowing  - No seizure tendency identified          Please contact the Neurology consult service with any neurological questions.      Eric Rose MD   of Neurology  [facility] at [facility]

## 2022-12-05 NOTE — PROGRESS NOTE ADULT - ATTENDING COMMENTS
62 year old morbid obese male, from [facility], has past medical hx of ambulatory dysfunction, ACD, bipolar disorder, BPH, CKD, HLD, PVD, COPD not on oxygen, hypothyroidism, RBBB, lymphedema, CHF (last echo 2/22 normal EF, GIDD). Presented for hypotension 73/37 mmHg and bradycardia (40's). Patient admitted to ICU for shock and acute encephalopathy. Now intubated on mechanical ventilation.     DX:  - Shock - septic vs hypovolemia  - Bilateral lower cellulitis   - Acute encephalopathy   - Sinus bradycardia  - HAGMA   - SHEYLA on CKD  - Anemia   - COPD not on oxygen at home   - HLD   - BPH  - Hypothyroidism   - Mood disorder     Plan:  - No further myoclonic activity this morning   - Cont. Keppra and VPA for now  - EEG with out seizures  - Neurology consult noted, recommend prolonged EEG monitoring  - CT head unremarkable   - Awake this morning  - Tolerating SBT  - Extubated today   - Monitor respiratory status   - Cont. antibiotics  - CT scan showing basilar pneumonia   - ID consult appreciated  - Cultures negative thus far   - Monitor renal function  - Viera for urinary obstruction   - HD as per nephrology   - Transfuse 1 unit PRBC  - Tube feedings via NGT   - Cont. home psych medications  - Cont. Levothyroxine  - DVT and stress ulcer prophylaxis  - Cont. ICU care for now.

## 2022-12-05 NOTE — PROGRESS NOTE ADULT - ASSESSMENT
62 year old morbid obese male, from [facility], has past medical hx of ambulatory dysfunction, ACD, bipolar disorder, BPH, CKD, HLD, PVD, COPD not on oxygen, hypothyroidism, RBBB, lymphedema, CHF (last echo 2/22 normal EF, GIDD) was BIBEMS for hypotension 73/37 mmHg and bradycardia (40's). Patient admitted to ICU for shock and acute encephalopathy.     DX:  - Shock septic vs hypovolemia  - LE cellulitis   - Acute encephalopathy   - Sinus bradycardia  - HAGMA   - SHEYLA on CKD  - Anemia   - COPD not on oxygen at home   - HLD   - BPH  - Hypothyroidism   - Mood disorder       =================== Neuro============================  #Acute Encephalopathy; likely in the setting of acidemia, infection, uremia    Lethargic, fighting vent    Pain meds with Tylenol   CT Head negative for acute bleed/swelling  Off sedation    #Seizure  - Home med includes valproic acid  - Patient is having repeated muscle spasms, improved on Versed but not fully stopped  - Still having muscle movements after dialysis which brought down BUN  - Started propofol instead of precedex  - Valproic acid level low, likely due to missed doses when patient was originally admitted.  - cont keppra and valproate as per neuro recs, prefer valproate as less nephrotoxic  - spot EEG 12/3 - moderate generalized slowing  - f/u 24 hr EEG (12/4)    ================= Cardiovascular==========================  Sinus bradycardia  - EKG sinus bradycardia, old RBBB  - TTE 12/1 - LVEF 60-65%  - trops neg  - Given normal EF, no changes in EKG, cardiogenic shock less likely    Hypotension  - off pressers      ================- Pulm=================================  COPD  - Patient with hx of COPD not in exacerbation   - Chest X ray Showed bilateral patchy infiltrates, no concern for fluid overload    - Oxygen supplementation as needed to maintain SpO2 of 88-92%  - C/w home meds   - currently on high flow O2 (12/4), but pt may require Bipap      ==================ID===================================  Shock  - Concern for sepsis vs hypovolemic   - Empirical treatment with Cefepime for LE cellulitis and for pseudomonal coverage  - S/p 4L NS   - Started on pressors   F/u lactate 1.4, procalcitonin, Bcx, Ucx, CK NGTD  - ID consulted Dr Nolasco     ================= Nephro================================  SHEYLA on CKD  - On admission, patient with Cr 6.4>>5.6, baseline 3.7  - Worsening CKD most likely a combination of obstructive vs pre- renal   - Viera catheter placed in ED with 1000 cc of UO  - -2200L in the past 24 hours  - Avoid nephrotoxins, NSAIDS, ACEI and ARBS  - Monitor BMP daily  - Nephro Dr Valenzuela consulted  - S/p Dialysis 12/2  [ ] Dialysis 12/3  [ ]Establish Line access via vascular    HAGMA  - Most likely from uremia   - ABG 7.2/44/97/17 >>7.34/34/102/18  - Resume Bicarb 325 Q6 hours  - Nephro Dr Valenzuela consulted    =================GI====================================  NGT in place  Start tube feeds  Resume psych PO medications- Wellbutrin  [ ] Bowel regimen, stool impaction    ================ Heme==================================  Anemia   - Anemia of chronic disease   - Monitor CBC daily   - Hgb Stable no signs of active bleed  - 12/4 - Hb 7.0, s/p 1U PRBC  - f/u repeat CBC    =================Endocrine===============================  Hypothyroidism  - F/u TSH Normal   - C/w home meds when not NPO     ================= Skin/Catheters============================  Peripheral IV lines   Viera catheter   A line and CVC   Sacral and Posterior Thigh Ulcers, Wound Care Consulted  [ ] Pending dialysis access    =================Prophylaxis =============================  DVT prophylaxis with Heparin SQ daily   GI prophylaxis with PPI daily    ==================GOC==================================  FULL CODE    62 year old morbid obese male, from [facility], has past medical hx of ambulatory dysfunction, ACD, bipolar disorder, BPH, CKD, HLD, PVD, COPD not on oxygen, hypothyroidism, RBBB, lymphedema, CHF (last echo 2/22 normal EF, GIDD) was BIBEMS for hypotension 73/37 mmHg and bradycardia (40's). Patient admitted to ICU for shock and acute encephalopathy.     DX:  - Shock septic vs hypovolemia  - LE cellulitis   - Acute encephalopathy   - Sinus bradycardia  - HAGMA   - SHEYLA on CKD  - Anemia   - COPD not on oxygen at home   - HLD   - BPH  - Hypothyroidism   - Mood disorder       =================== Neuro============================  #Acute Encephalopathy; likely in the setting of acidemia, infection, uremia    Lethargic, fighting vent    Pain meds with Tylenol   CT Head negative for acute bleed/swelling  Off sedation  Alert and Oriented X2    #Seizure  - Home med includes valproic acid  - Patient is having repeated muscle spasms, improved on Versed but not fully stopped  - Still having muscle movements after dialysis which brought down BUN  - Started propofol instead of precedex  - Valproic acid level low, likely due to missed doses when patient was originally admitted.  - cont keppra and valproate as per neuro recs, prefer valproate as less nephrotoxic  - spot EEG 12/3 - moderate generalized slowing  - f/u 24 hr EEG (12/4)  [ ] Valproic Acid Trough in AM     ================= Cardiovascular==========================  Sinus bradycardia  - EKG sinus bradycardia, old RBBB  - TTE 12/1 - LVEF 60-65%  - trops neg  - Given normal EF, no changes in EKG, cardiogenic shock less likely    Hypotension  - off pressers      ================- Pulm=================================  COPD  - Patient with hx of COPD not in exacerbation   - Chest X ray Showed bilateral patchy infiltrates, no concern for fluid overload    - Oxygen supplementation as needed to maintain SpO2 of 88-92%  - C/w home meds   - currently on high flow O2 (12/4), but pt may require Bipap      ==================ID===================================  Shock  - Concern for sepsis vs hypovolemic   - Empirical treatment with Cefepime for LE cellulitis and for pseudomonal coverage  - S/p 4L NS   - Started on pressors   F/u lactate 1.4, procalcitonin, Bcx, Ucx, CK NGTD  - ID consulted Dr Nolasco     ================= Nephro================================  SHEYLA on CKD  - On admission, patient with Cr 6.4>>5.6, baseline 3.7  - Worsening CKD most likely a combination of obstructive vs pre- renal   - Viera catheter placed in ED with 1000 cc of UO  - -2200L in the past 24 hours  - Avoid nephrotoxins, NSAIDS, ACEI and ARBS  - Monitor BMP daily  - Nephro Dr Valenzuela consulted  - S/p Dialysis 12/2 and 12/3, Creatnine near baseline, mental status improved  [ ] Follow up w/ nephro to see if shiley can be removed    HAGMA  - Most likely from uremia   - ABG 7.2/44/97/17 >>7.34/34/102/18  - Resume Bicarb 325 Q6 hours  - Nephro Dr Valenzuela consulted    =================GI====================================  NGT in place  Start tube feeds  Resume psych PO medications- Wellbutrin  Swallow evaluation  Bowel Regimen: Suppository; Impacted    ================ Heme==================================  Anemia   - Anemia of chronic disease   - Monitor CBC daily   - Hgb Stable no signs of active bleed  - 12/4 - Hb 7.0, s/p 1U PRBC  - f/u repeat CBC    =================Endocrine===============================  Hypothyroidism  - F/u TSH Normal   - C/w home meds when not NPO     ================= Skin/Catheters============================  Peripheral IV lines   Viera catheter    Sacral and Posterior Thigh Ulcers, Wound Care Consulted  [ ] f/u removal of shiley     =================Prophylaxis =============================  DVT prophylaxis with Heparin SQ daily   GI prophylaxis with PPI daily    ==================GOC==================================  FULL CODE    62 year old morbid obese male, from [facility], has past medical hx of ambulatory dysfunction, ACD, bipolar disorder, BPH, CKD, HLD, PVD, COPD not on oxygen, hypothyroidism, RBBB, lymphedema, CHF (last echo 2/22 normal EF, GIDD) was BIBEMS for hypotension 73/37 mmHg and bradycardia (40's). Patient admitted to ICU for shock and acute encephalopathy.     DX:  - Shock septic vs hypovolemia  - LE cellulitis   - Acute encephalopathy   - Sinus bradycardia  - HAGMA   - SHEYLA on CKD  - Anemia   - COPD not on oxygen at home   - HLD   - BPH  - Hypothyroidism   - Mood disorder       =================== Neuro============================  #Acute Encephalopathy; likely in the setting of acidemia, infection, uremia    Lethargic, fighting vent    Pain meds with Tylenol   CT Head negative for acute bleed/swelling  Off sedation  Alert and Oriented X2    #Seizure  - Home med includes valproic acid  - Patient is having repeated muscle spasms, improved on Versed but not fully stopped  - Still having muscle movements after dialysis which brought down BUN  - Started propofol instead of precedex  - Valproic acid level low, likely due to missed doses when patient was originally admitted.  - cont keppra and valproate as per neuro recs, prefer valproate as less nephrotoxic  - spot EEG 12/3 - moderate generalized slowing  - f/u 24 hr EEG (12/4)  [ ] Increase Valproic Acid to 750mg Q6  [ ] Valproic Acid Trough in AM     ================= Cardiovascular==========================  Sinus bradycardia  - EKG sinus bradycardia, old RBBB  - TTE 12/1 - LVEF 60-65%  - trops neg  - Given normal EF, no changes in EKG, cardiogenic shock less likely    Hypotension  - off pressers      ================- Pulm=================================  COPD  - Patient with hx of COPD not in exacerbation   - Chest X ray Showed bilateral patchy infiltrates, no concern for fluid overload    - Oxygen supplementation as needed to maintain SpO2 of 88-92%  - C/w home meds   - currently on high flow O2 (12/4), but pt may require Bipap      ==================ID===================================  Shock  - Concern for sepsis vs hypovolemic   - Empirical treatment with Cefepime for LE cellulitis and for pseudomonal coverage  - S/p 4L NS   - Started on pressors   F/u lactate 1.4, procalcitonin, Bcx, Ucx, CK NGTD  - ID consulted Dr Nolasco     ================= Nephro================================  SHEYLA on CKD  - On admission, patient with Cr 6.4>>5.6, baseline 3.7  - Worsening CKD most likely a combination of obstructive vs pre- renal   - Viera catheter placed in ED with 1000 cc of UO  - -2200L in the past 24 hours  - Avoid nephrotoxins, NSAIDS, ACEI and ARBS  - Monitor BMP daily  - Nephro Dr Valenzuela consulted  - S/p Dialysis 12/2 and 12/3, Creatnine near baseline, mental status improved  [ ] Follow up w/ nephro to see if shiley can be removed    HAGMA  - Most likely from uremia   - ABG 7.2/44/97/17 >>7.34/34/102/18  - Resume Bicarb 325 Q6 hours  - Nephro Dr Valenzuela consulted    =================GI====================================  NGT in place  Start tube feeds  Resume psych PO medications- Wellbutrin  Swallow evaluation  Bowel Regimen: Suppository; Impacted    ================ Heme==================================  Anemia   - Anemia of chronic disease   - Monitor CBC daily   - Hgb Stable no signs of active bleed  - 12/4 - Hb 7.0, s/p 1U PRBC  - f/u repeat CBC    =================Endocrine===============================  Hypothyroidism  - F/u TSH Normal   - C/w home meds when not NPO     ================= Skin/Catheters============================  Peripheral IV lines   Viera catheter    Sacral and Posterior Thigh Ulcers, Wound Care Consulted  [ ] f/u removal of shiley     =================Prophylaxis =============================  DVT prophylaxis with Heparin SQ daily   GI prophylaxis with PPI daily    ==================GOC==================================  FULL CODE    62 year old morbid obese male, from [facility], has past medical hx of ambulatory dysfunction, ACD, bipolar disorder, BPH, CKD, HLD, PVD, COPD not on oxygen, hypothyroidism, RBBB, lymphedema, CHF (last echo 2/22 normal EF, GIDD) was BIBEMS for hypotension 73/37 mmHg and bradycardia (40's). Patient admitted to ICU for shock and acute encephalopathy.     DX:  - Shock septic vs hypovolemia  - LE cellulitis   - Acute encephalopathy   - Sinus bradycardia  - HAGMA   - SHEYLA on CKD  - Anemia   - COPD not on oxygen at home   - HLD   - BPH  - Hypothyroidism   - Mood disorder       =================== Neuro============================  #Acute Encephalopathy; likely in the setting of acidemia, infection, uremia    Lethargic, fighting vent    Pain meds with Tylenol   CT Head negative for acute bleed/swelling  Off sedation  Alert and Oriented X2    #Seizure  - Home med includes valproic acid  - Patient is having repeated muscle spasms, improved on Versed but not fully stopped  - Still having muscle movements after dialysis which brought down BUN  - Started propofol instead of precedex  - Valproic acid level low, likely due to missed doses when patient was originally admitted.  - cont keppra and valproate as per neuro recs, prefer valproate as less nephrotoxic  - spot EEG 12/3 - moderate generalized slowing  - f/u 24 hr EEG (12/4)  [ ] Increase Valproic Acid to 750mg Q6  [ ] Valproic Acid Trough in AM     ================= Cardiovascular==========================  Sinus bradycardia  - EKG sinus bradycardia, old RBBB  - TTE 12/1 - LVEF 60-65%  - trops neg  - Given normal EF, no changes in EKG, cardiogenic shock less likely    Hypotension  - off pressers      ================- Pulm=================================  COPD  - Patient with hx of COPD not in exacerbation   - Chest X ray Showed bilateral patchy infiltrates, no concern for fluid overload    - Oxygen supplementation as needed to maintain SpO2 of 88-92%  - C/w home meds   - currently on high flow O2 (12/4), but pt may require Bipap      ==================ID===================================  Shock  - Concern for sepsis vs hypovolemic   - Empirical treatment with Cefepime for LE cellulitis and for pseudomonal coverage  - S/p 4L NS   - Started on pressors   F/u lactate 1.4, procalcitonin, Bcx, Ucx, CK NGTD  - ID consulted Dr Nolasco     ================= Nephro================================  SHEYLA on CKD  - On admission, patient with Cr 6.4>>5.6, baseline 3.7; back to [facility] today  - Worsening CKD most likely a combination of obstructive vs pre- renal   - Viera catheter placed in ED with 1000 cc of UO  - -58mL in the past 24 hours  - Avoid nephrotoxins, NSAIDS, ACEI and ARBS  - Monitor BMP daily  - Nephro Dr Valenzuela consulted  - S/p Dialysis 12/2 and 12/3, Creatnine near baseline, mental status improved  [ ] Follow up w/ nephro to see if shiley can be removed    HAGMA  - Most likely from uremia   - ABG 7.2/44/97/17 >>7.34/34/102/18  - Resume Bicarb 325 Q6 hours  - Nephro Dr Valenzuela consulted    =================GI====================================  NGT in place  Start tube feeds  Resume psych PO medications- Wellbutrin  Swallow evaluation  Bowel Regimen: Suppository; Impacted    ================ Heme==================================  Anemia   - Anemia of chronic disease   - Monitor CBC daily   - Hgb Stable no signs of active bleed  - 12/4 - Hb 7.0, s/p 1U PRBC  - f/u repeat CBC    =================Endocrine===============================  Hypothyroidism  - F/u TSH Normal   - C/w home meds when not NPO     ================= Skin/Catheters============================  Peripheral IV lines   Viera catheter    Sacral and Posterior Thigh Ulcers, Wound Care Consulted  [ ] f/u removal of shiley     =================Prophylaxis =============================  DVT prophylaxis with Heparin SQ daily   GI prophylaxis with PPI daily    ==================GOC==================================  FULL CODE

## 2022-12-05 NOTE — PROGRESS NOTE ADULT - SUBJECTIVE AND OBJECTIVE BOX
INTERVAL HPI/OVERNIGHT EVENTS:     PRESSORS: [ ] YES [ ] NO  WHICH:    ANTIBIOTICS:                  DATE STARTED:  ANTIBIOTICS:                  DATE STARTED:  ANTIBIOTICS:                  DATE STARTED:    Antimicrobial:  azithromycin  IVPB 500 milliGRAM(s) IV Intermittent every 24 hours  cefTRIAXone   IVPB 2000 milliGRAM(s) IV Intermittent every 24 hours    Cardiovascular:    Pulmonary:  albuterol    90 MICROgram(s) HFA Inhaler 2 Puff(s) Inhalation every 6 hours    Hematalogic:  heparin   Injectable 5000 Unit(s) SubCutaneous every 8 hours    Other:  chlorhexidine 2% Cloths 1 Application(s) Topical <User Schedule>  collagenase Ointment 1 Application(s) Topical two times a day  epoetin madyson-epbx (RETACRIT) Injectable 8000 Unit(s) IV Push <User Schedule>  finasteride 5 milliGRAM(s) Oral daily  levETIRAcetam  IVPB 500 milliGRAM(s) IV Intermittent every 12 hours  levothyroxine 125 MICROGram(s) Oral daily  mupirocin 2% Ointment 1 Application(s) Both Nostrils two times a day  pantoprazole   Suspension 40 milliGRAM(s) Oral daily  polyethylene glycol 3350 17 Gram(s) Oral at bedtime  risperiDONE   Tablet 2 milliGRAM(s) Oral daily  senna Syrup 10 milliLiter(s) Oral at bedtime  sevelamer carbonate Powder 800 milliGRAM(s) Enteral Tube three times a day  tamsulosin 0.4 milliGRAM(s) Oral at bedtime  valproic  acid Syrup 650 milliGRAM(s) Oral every 6 hours  zinc oxide 20% Ointment 1 Application(s) Topical two times a day      Drug Dosing Weight  Height (cm): 177.8 (30 Nov 2022 18:45)  Weight (kg): 113.9 (30 Nov 2022 18:45)  BMI (kg/m2): 36 (30 Nov 2022 18:45)  BSA (m2): 2.3 (30 Nov 2022 18:45)    CENTRAL LINE: [ ] YES [ ] NO  LOCATION:   DATE INSERTED:  REMOVE: [ ] YES [ ] NO  EXPLAIN:    HATFIELD: [ ] YES [ ] NO    DATE INSERTED:  REMOVE:  [ ] YES [ ] NO  EXPLAIN:    A-LINE:  [ ] YES [ ] NO  LOCATION:   DATE INSERTED:  REMOVE:  [ ] YES [ ] NO  EXPLAIN:    PMH/Social Hx/Fam Hx -reviewed admission note, no change since admission  PAST MEDICAL & SURGICAL HISTORY:  Hypothyroid      Hyperlipidemia      Ambulatory dysfunction      Anemia      History of BPH      CKD (chronic kidney disease)      Chronic obstructive pulmonary disease (COPD)      Bipolar disorder      HLD (hyperlipidemia)      BPH (benign prostatic hyperplasia)      Chronic diastolic congestive heart failure      Lymphedema      No significant past surgical history        Heart faliure: acute [ ] chronic [ ] acute or chronic [ ] diastolic [ ] systolic [ ] combied systolic and diastolic[ ]  SHEYLA: ATN[ ] renal medullary necrosis [ ] CKD I [ ]CKDII [ ]CKD III [ ]CKD IV [ ]CKD V [ ]Other pathological lesions [ ]  Abdominal Nutrition Status: malnutrition [ ] cachexia [ ] morbid obesity/BMI=40 [ ] Supplement ordered [___________]     T(C): 36.2 (12-05-22 @ 07:00), Max: 36.4 (12-04-22 @ 15:20)  HR: 96 (12-05-22 @ 08:00)  BP: 130/60 (12-05-22 @ 08:00)  BP(mean): 79 (12-05-22 @ 08:00)  ABP: 143/57 (12-04-22 @ 15:00)  ABP(mean): 85 (12-04-22 @ 14:45)  RR: 19 (12-05-22 @ 08:00)  SpO2: 94% (12-05-22 @ 08:00)  Wt(kg): --    ABG - ( 05 Dec 2022 05:21 )  pH, Arterial: 7.32  pH, Blood: x     /  pCO2: 55    /  pO2: 77    / HCO3: 28    / Base Excess: 1.1   /  SaO2: 97                    12-04 @ 07:01  -  12-05 @ 07:00  --------------------------------------------------------  IN: 1522 mL / OUT: 1580 mL / NET: -58 mL        Mode: HFNC   FiO2: 30      PHYSICAL EXAM:    GENERAL: [ ]NAD, [ ]well-groomed, [ ]well-developed  HEAD:  [ ]Atraumatic, [ ]Normocephalic  EYES: [ ]EOMI, [ ]PERRLA, [ ]conjunctiva and sclera clear  ENMT: [ ]No tonsillar erythema, exudates, or enlargement; [ ]Moist mucous membranes, [ ]Good dentition, [ ]No lesions  NECK: [ ]Supple, normal appearance, [ ]No JVD; [ ]Normal thyroid; [ ]Trachea midline  NERVOUS SYSTEM:  [ ]Alert & Oriented X3, [ ]Good concentration; [ ]Motor Strength 5/5 B/L upper and lower extremities; [ ]DTRs 2+ intact and symmetric  CHEST/LUNG: [ ]No chest deformity; [ ]Normal percussion bilaterally; [ ]No rales, rhonchi, wheezing; [ ]Crackles at bases  HEART: [ ]Regular rate and rhythm; [ ]No murmurs, rubs, or gallops  ABDOMEN: [ ]Soft, Nontender, Nondistended; [ ]Bowel sounds present  EXTREMITIES:  [ ]2+ Peripheral Pulses, [ ]No clubbing, cyanosis, or edema [ ]Bilat lower extremity edema  LYMPH: [ ]No lymphadenopathy noted  SKIN: [ ]No rashes or lesions; [ ]Good capillary refill      LABS:  CBC Full  -  ( 05 Dec 2022 05:09 )  WBC Count : 10.64 K/uL  RBC Count : 2.68 M/uL  Hemoglobin : 7.9 g/dL  Hematocrit : 26.2 %  Platelet Count - Automated : 199 K/uL  Mean Cell Volume : 97.8 fl  Mean Cell Hemoglobin : 29.5 pg  Mean Cell Hemoglobin Concentration : 30.2 gm/dL  Auto Neutrophil # : x  Auto Lymphocyte # : x  Auto Monocyte # : x  Auto Eosinophil # : x  Auto Basophil # : x  Auto Neutrophil % : x  Auto Lymphocyte % : x  Auto Monocyte % : x  Auto Eosinophil % : x  Auto Basophil % : x    12-05    144  |  109<H>  |  68<H>  ----------------------------<  94  3.9   |  30  |  3.34<H>    Ca    8.9      05 Dec 2022 05:09  Phos  4.9     12-05  Mg     2.4     12-05              RADIOLOGY & ADDITIONAL STUDIES REVIEWED:      [ ]GOALS OF CARE DISCUSSION WITH PATIENT/FAMILY/PROXY:    CRITICAL CARE TIME SPENT: 35 minutes INTERVAL HPI/OVERNIGHT EVENTS: EEG, extubated, AAOX2 to self and time, but not place    Antimicrobial:  azithromycin  IVPB 500 milliGRAM(s) IV Intermittent every 24 hours  cefTRIAXone   IVPB 2000 milliGRAM(s) IV Intermittent every 24 hours    Cardiovascular:    Pulmonary:  albuterol    90 MICROgram(s) HFA Inhaler 2 Puff(s) Inhalation every 6 hours    Hematalogic:  heparin   Injectable 5000 Unit(s) SubCutaneous every 8 hours    Other:  chlorhexidine 2% Cloths 1 Application(s) Topical <User Schedule>  collagenase Ointment 1 Application(s) Topical two times a day  epoetin madyson-epbx (RETACRIT) Injectable 8000 Unit(s) IV Push <User Schedule>  finasteride 5 milliGRAM(s) Oral daily  levETIRAcetam  IVPB 500 milliGRAM(s) IV Intermittent every 12 hours  levothyroxine 125 MICROGram(s) Oral daily  mupirocin 2% Ointment 1 Application(s) Both Nostrils two times a day  pantoprazole   Suspension 40 milliGRAM(s) Oral daily  polyethylene glycol 3350 17 Gram(s) Oral at bedtime  risperiDONE   Tablet 2 milliGRAM(s) Oral daily  senna Syrup 10 milliLiter(s) Oral at bedtime  sevelamer carbonate Powder 800 milliGRAM(s) Enteral Tube three times a day  tamsulosin 0.4 milliGRAM(s) Oral at bedtime  valproic  acid Syrup 650 milliGRAM(s) Oral every 6 hours  zinc oxide 20% Ointment 1 Application(s) Topical two times a day      Drug Dosing Weight  Height (cm): 177.8 (30 Nov 2022 18:45)  Weight (kg): 113.9 (30 Nov 2022 18:45)  BMI (kg/m2): 36 (30 Nov 2022 18:45)  BSA (m2): 2.3 (30 Nov 2022 18:45)    PMH/Social Hx/Fam Hx -reviewed admission note, no change since admission  PAST MEDICAL & SURGICAL HISTORY:  Hypothyroid      Hyperlipidemia      Ambulatory dysfunction      Anemia      History of BPH      CKD (chronic kidney disease)      Chronic obstructive pulmonary disease (COPD)      Bipolar disorder      HLD (hyperlipidemia)      BPH (benign prostatic hyperplasia)      Chronic diastolic congestive heart failure      Lymphedema      No significant past surgical history       T(C): 36.2 (12-05-22 @ 07:00), Max: 36.4 (12-04-22 @ 15:20)  HR: 96 (12-05-22 @ 08:00)  BP: 130/60 (12-05-22 @ 08:00)  BP(mean): 79 (12-05-22 @ 08:00)  ABP: 143/57 (12-04-22 @ 15:00)  ABP(mean): 85 (12-04-22 @ 14:45)  RR: 19 (12-05-22 @ 08:00)  SpO2: 94% (12-05-22 @ 08:00)  Wt(kg): --    ABG - ( 05 Dec 2022 05:21 )  pH, Arterial: 7.32  pH, Blood: x     /  pCO2: 55    /  pO2: 77    / HCO3: 28    / Base Excess: 1.1   /  SaO2: 97                    12-04 @ 07:01  -  12-05 @ 07:00  --------------------------------------------------------  IN: 1522 mL / OUT: 1580 mL / NET: -58 mL        Mode: HFNC   FiO2: 30      PHYSICAL EXAM:  GENERAL: NAD, EEG study in progress  HEAD:  Atraumatic, Normocephalic  EYES: EOMI, PERRLA, conjunctiva and sclera clear  NECK: Supple, normal appearance  NERVOUS SYSTEM:  Alert & Oriented X2 to self and time, not place, moves extremities spontaneously   CHEST/LUNG: Lungs clear to auscultation bilaterally, No rales, rhonchi, wheezing   HEART: Regular rate and rhythm; No murmurs, rubs, or gallops  ABDOMEN: Soft, Nontender, Distended; Bowel sounds present  EXTREMITIES:  2+ Peripheral Pulses, No clubbing, cyanosis, or edema  LYMPH: No lymphadenopathy noted  SKIN: No rashes or lesions;  Good capillary refill      LABS:  CBC Full  -  ( 05 Dec 2022 05:09 )  WBC Count : 10.64 K/uL  RBC Count : 2.68 M/uL  Hemoglobin : 7.9 g/dL  Hematocrit : 26.2 %  Platelet Count - Automated : 199 K/uL  Mean Cell Volume : 97.8 fl  Mean Cell Hemoglobin : 29.5 pg  Mean Cell Hemoglobin Concentration : 30.2 gm/dL  Auto Neutrophil # : x  Auto Lymphocyte # : x  Auto Monocyte # : x  Auto Eosinophil # : x  Auto Basophil # : x  Auto Neutrophil % : x  Auto Lymphocyte % : x  Auto Monocyte % : x  Auto Eosinophil % : x  Auto Basophil % : x    12-05    144  |  109<H>  |  68<H>  ----------------------------<  94  3.9   |  30  |  3.34<H>    Ca    8.9      05 Dec 2022 05:09  Phos  4.9     12-05  Mg     2.4     12-05              RADIOLOGY & ADDITIONAL STUDIES REVIEWED:      [ ]GOALS OF CARE DISCUSSION WITH PATIENT/FAMILY/PROXY:    CRITICAL CARE TIME SPENT: 35 minutes

## 2022-12-06 NOTE — PHYSICAL THERAPY INITIAL EVALUATION ADULT - DIAGNOSIS, PT EVAL
Pt presents w/ impairments in cognition and strength which have reduced his ability to perform bed mobility.

## 2022-12-06 NOTE — PROGRESS NOTE ADULT - SUBJECTIVE AND OBJECTIVE BOX
INTERVAL HPI/OVERNIGHT EVENTS:     PRESSORS: [ ] YES [ ] NO  WHICH:    ANTIBIOTICS:                  DATE STARTED:  ANTIBIOTICS:                  DATE STARTED:  ANTIBIOTICS:                  DATE STARTED:    Antimicrobial:  cefTRIAXone   IVPB 2000 milliGRAM(s) IV Intermittent every 24 hours    Cardiovascular:    Pulmonary:  albuterol    0.083% 2.5 milliGRAM(s) Nebulizer every 6 hours  albuterol    90 MICROgram(s) HFA Inhaler 1 Puff(s) Inhalation every 4 hours    Hematalogic:  heparin   Injectable 5000 Unit(s) SubCutaneous every 8 hours    Other:  bisacodyl Suppository 10 milliGRAM(s) Rectal daily PRN  chlorhexidine 2% Cloths 1 Application(s) Topical <User Schedule>  collagenase Ointment 1 Application(s) Topical two times a day  epoetin madyson-epbx (RETACRIT) Injectable 8000 Unit(s) IV Push <User Schedule>  finasteride 5 milliGRAM(s) Oral daily  levETIRAcetam  IVPB 500 milliGRAM(s) IV Intermittent every 12 hours  levothyroxine 125 MICROGram(s) Oral daily  mupirocin 2% Ointment 1 Application(s) Both Nostrils two times a day  pantoprazole   Suspension 40 milliGRAM(s) Oral daily  polyethylene glycol 3350 17 Gram(s) Oral at bedtime  risperiDONE   Tablet 2 milliGRAM(s) Oral daily  senna Syrup 10 milliLiter(s) Oral at bedtime  sevelamer carbonate Powder 800 milliGRAM(s) Enteral Tube three times a day  tamsulosin 0.4 milliGRAM(s) Oral at bedtime  valproic  acid Syrup 750 milliGRAM(s) Oral every 6 hours  zinc oxide 20% Ointment 1 Application(s) Topical two times a day      Drug Dosing Weight  Height (cm): 177.8 (30 Nov 2022 18:45)  Weight (kg): 113.9 (30 Nov 2022 18:45)  BMI (kg/m2): 36 (30 Nov 2022 18:45)  BSA (m2): 2.3 (30 Nov 2022 18:45)    CENTRAL LINE: [ ] YES [ ] NO  LOCATION:   DATE INSERTED:  REMOVE: [ ] YES [ ] NO  EXPLAIN:    HATFIELD: [ ] YES [ ] NO    DATE INSERTED:  REMOVE:  [ ] YES [ ] NO  EXPLAIN:    A-LINE:  [ ] YES [ ] NO  LOCATION:   DATE INSERTED:  REMOVE:  [ ] YES [ ] NO  EXPLAIN:    PMH/Social Hx/Fam Hx -reviewed admission note, no change since admission  PAST MEDICAL & SURGICAL HISTORY:  Hypothyroid      Hyperlipidemia      Ambulatory dysfunction      Anemia      History of BPH      CKD (chronic kidney disease)      Chronic obstructive pulmonary disease (COPD)      Bipolar disorder      HLD (hyperlipidemia)      BPH (benign prostatic hyperplasia)      Chronic diastolic congestive heart failure      Lymphedema      No significant past surgical history        Heart faliure: acute [ ] chronic [ ] acute or chronic [ ] diastolic [ ] systolic [ ] combied systolic and diastolic[ ]  SHEYLA: ATN[ ] renal medullary necrosis [ ] CKD I [ ]CKDII [ ]CKD III [ ]CKD IV [ ]CKD V [ ]Other pathological lesions [ ]  Abdominal Nutrition Status: malnutrition [ ] cachexia [ ] morbid obesity/BMI=40 [ ] Supplement ordered [___________]     T(C): 37.6 (12-06-22 @ 05:25), Max: 37.6 (12-06-22 @ 05:25)  HR: 100 (12-06-22 @ 06:00)  BP: 110/42 (12-06-22 @ 05:00)  BP(mean): 61 (12-06-22 @ 05:00)  ABP: --  ABP(mean): --  RR: 35 (12-06-22 @ 06:00)  SpO2: 100% (12-06-22 @ 06:00)  Wt(kg): --    ABG - ( 05 Dec 2022 05:21 )  pH, Arterial: 7.32  pH, Blood: x     /  pCO2: 55    /  pO2: 77    / HCO3: 28    / Base Excess: 1.1   /  SaO2: 97                    12-05 @ 07:01  -  12-06 @ 07:00  --------------------------------------------------------  IN: 1025 mL / OUT: 1670 mL / NET: -645 mL            PHYSICAL EXAM:    GENERAL: [ ]NAD, [ ]well-groomed, [ ]well-developed  HEAD:  [ ]Atraumatic, [ ]Normocephalic  EYES: [ ]EOMI, [ ]PERRLA, [ ]conjunctiva and sclera clear  ENMT: [ ]No tonsillar erythema, exudates, or enlargement; [ ]Moist mucous membranes, [ ]Good dentition, [ ]No lesions  NECK: [ ]Supple, normal appearance, [ ]No JVD; [ ]Normal thyroid; [ ]Trachea midline  NERVOUS SYSTEM:  [ ]Alert & Oriented X3, [ ]Good concentration; [ ]Motor Strength 5/5 B/L upper and lower extremities; [ ]DTRs 2+ intact and symmetric  CHEST/LUNG: [ ]No chest deformity; [ ]Normal percussion bilaterally; [ ]No rales, rhonchi, wheezing; [ ]Crackles at bases  HEART: [ ]Regular rate and rhythm; [ ]No murmurs, rubs, or gallops  ABDOMEN: [ ]Soft, Nontender, Nondistended; [ ]Bowel sounds present  EXTREMITIES:  [ ]2+ Peripheral Pulses, [ ]No clubbing, cyanosis, or edema [ ]Bilat lower extremity edema  LYMPH: [ ]No lymphadenopathy noted  SKIN: [ ]No rashes or lesions; [ ]Good capillary refill      LABS:  CBC Full  -  ( 06 Dec 2022 04:16 )  WBC Count : 10.73 K/uL  RBC Count : 2.56 M/uL  Hemoglobin : 7.6 g/dL  Hematocrit : 25.3 %  Platelet Count - Automated : 161 K/uL  Mean Cell Volume : 98.8 fl  Mean Cell Hemoglobin : 29.7 pg  Mean Cell Hemoglobin Concentration : 30.0 gm/dL  Auto Neutrophil # : x  Auto Lymphocyte # : x  Auto Monocyte # : x  Auto Eosinophil # : x  Auto Basophil # : x  Auto Neutrophil % : x  Auto Lymphocyte % : x  Auto Monocyte % : x  Auto Eosinophil % : x  Auto Basophil % : x    12-06    149<H>  |  112<H>  |  76<H>  ----------------------------<  102<H>  4.1   |  31  |  3.56<H>    Ca    9.4      06 Dec 2022 04:16  Phos  4.0     12-06  Mg     2.3     12-06    TPro  5.8<L>  /  Alb  1.8<L>  /  TBili  0.2  /  DBili  x   /  AST  13  /  ALT  12  /  AlkPhos  71  12-06            RADIOLOGY & ADDITIONAL STUDIES REVIEWED:      [ ]GOALS OF CARE DISCUSSION WITH PATIENT/FAMILY/PROXY:    CRITICAL CARE TIME SPENT: 35 minutes INTERVAL HPI/OVERNIGHT EVENTS: coughing while on tube feeds, tube feeds held, on BiPAP    Antimicrobial:  cefTRIAXone   IVPB 2000 milliGRAM(s) IV Intermittent every 24 hours    Cardiovascular:    Pulmonary:  albuterol    0.083% 2.5 milliGRAM(s) Nebulizer every 6 hours  albuterol    90 MICROgram(s) HFA Inhaler 1 Puff(s) Inhalation every 4 hours    Hematalogic:  heparin   Injectable 5000 Unit(s) SubCutaneous every 8 hours    Other:  bisacodyl Suppository 10 milliGRAM(s) Rectal daily PRN  chlorhexidine 2% Cloths 1 Application(s) Topical <User Schedule>  collagenase Ointment 1 Application(s) Topical two times a day  epoetin madyson-epbx (RETACRIT) Injectable 8000 Unit(s) IV Push <User Schedule>  finasteride 5 milliGRAM(s) Oral daily  levETIRAcetam  IVPB 500 milliGRAM(s) IV Intermittent every 12 hours  levothyroxine 125 MICROGram(s) Oral daily  mupirocin 2% Ointment 1 Application(s) Both Nostrils two times a day  pantoprazole   Suspension 40 milliGRAM(s) Oral daily  polyethylene glycol 3350 17 Gram(s) Oral at bedtime  risperiDONE   Tablet 2 milliGRAM(s) Oral daily  senna Syrup 10 milliLiter(s) Oral at bedtime  sevelamer carbonate Powder 800 milliGRAM(s) Enteral Tube three times a day  tamsulosin 0.4 milliGRAM(s) Oral at bedtime  valproic  acid Syrup 750 milliGRAM(s) Oral every 6 hours  zinc oxide 20% Ointment 1 Application(s) Topical two times a day      Drug Dosing Weight  Height (cm): 177.8 (30 Nov 2022 18:45)  Weight (kg): 113.9 (30 Nov 2022 18:45)  BMI (kg/m2): 36 (30 Nov 2022 18:45)  BSA (m2): 2.3 (30 Nov 2022 18:45)    PMH/Social Hx/Fam Hx -reviewed admission note, no change since admission  PAST MEDICAL & SURGICAL HISTORY:  Hypothyroid      Hyperlipidemia      Ambulatory dysfunction      Anemia      History of BPH      CKD (chronic kidney disease)      Chronic obstructive pulmonary disease (COPD)      Bipolar disorder      HLD (hyperlipidemia)      BPH (benign prostatic hyperplasia)      Chronic diastolic congestive heart failure      Lymphedema      No significant past surgical history       T(C): 37.6 (12-06-22 @ 05:25), Max: 37.6 (12-06-22 @ 05:25)  HR: 100 (12-06-22 @ 06:00)  BP: 110/42 (12-06-22 @ 05:00)  BP(mean): 61 (12-06-22 @ 05:00)  ABP: --  ABP(mean): --  RR: 35 (12-06-22 @ 06:00)  SpO2: 100% (12-06-22 @ 06:00)  Wt(kg): --    ABG - ( 05 Dec 2022 05:21 )  pH, Arterial: 7.32  pH, Blood: x     /  pCO2: 55    /  pO2: 77    / HCO3: 28    / Base Excess: 1.1   /  SaO2: 97                    12-05 @ 07:01  -  12-06 @ 07:00  --------------------------------------------------------  IN: 1025 mL / OUT: 1670 mL / NET: -645 mL            PHYSICAL EXAM:    GENERAL: on Bipap, NAD   HEAD:  Atraumatic, Normocephalic  EYES: EOMI, PERRLA, conjunctiva and sclera clear  NECK: Supple, normal appearance  NERVOUS SYSTEM:  Alert & Oriented X2, some discomfort  CHEST/LUNG: Lungs clear to auscultation bilaterally  HEART: Slow rate and rhythm  ABDOMEN: Soft, Nontender, Nondistended; Bowel sounds present  EXTREMITIES: bilateral edema, stable, erythema, venous stasis          LABS:  CBC Full  -  ( 06 Dec 2022 04:16 )  WBC Count : 10.73 K/uL  RBC Count : 2.56 M/uL  Hemoglobin : 7.6 g/dL  Hematocrit : 25.3 %  Platelet Count - Automated : 161 K/uL  Mean Cell Volume : 98.8 fl  Mean Cell Hemoglobin : 29.7 pg  Mean Cell Hemoglobin Concentration : 30.0 gm/dL  Auto Neutrophil # : x  Auto Lymphocyte # : x  Auto Monocyte # : x  Auto Eosinophil # : x  Auto Basophil # : x  Auto Neutrophil % : x  Auto Lymphocyte % : x  Auto Monocyte % : x  Auto Eosinophil % : x  Auto Basophil % : x    12-06    149<H>  |  112<H>  |  76<H>  ----------------------------<  102<H>  4.1   |  31  |  3.56<H>    Ca    9.4      06 Dec 2022 04:16  Phos  4.0     12-06  Mg     2.3     12-06    TPro  5.8<L>  /  Alb  1.8<L>  /  TBili  0.2  /  DBili  x   /  AST  13  /  ALT  12  /  AlkPhos  71  12-06            RADIOLOGY & ADDITIONAL STUDIES REVIEWED:      [ ]GOALS OF CARE DISCUSSION WITH PATIENT/FAMILY/PROXY:    CRITICAL CARE TIME SPENT: 35 minutes

## 2022-12-06 NOTE — PROGRESS NOTE ADULT - SUBJECTIVE AND OBJECTIVE BOX
NEPHROLOGY MEDICAL CARE, [facility] - Dr. Taj Valenzuela/ Dr. Hazel Solis/ Dr. Cosmo Root/ Dr. Fang Gifford    Patient was seen and examined at bedside.    CC: patient on bipap and still non-verbal.    Vital Signs Last 24 Hrs  T(C): 37.3 (06 Dec 2022 08:00), Max: 37.6 (06 Dec 2022 05:25)  T(F): 99.2 (06 Dec 2022 08:00), Max: 99.6 (06 Dec 2022 05:25)  HR: 95 (06 Dec 2022 08:00) (67 - 104)  BP: 128/69 (06 Dec 2022 08:00) (101/53 - 141/52)  BP(mean): 85 (06 Dec 2022 08:00) (57 - 85)  RR: 25 (06 Dec 2022 08:00) (15 - 35)  SpO2: 94% (06 Dec 2022 08:00) (82% - 100%)    Parameters below as of 06 Dec 2022 08:00  Patient On (Oxygen Delivery Method): BiPAP/CPAP        12-05 @ 07:01  -  12-06 @ 07:00  --------------------------------------------------------  IN: 1025 mL / OUT: 1670 mL / NET: -645 mL    12-06 @ 07:01  -  12-06 @ 09:02  --------------------------------------------------------  IN: 0 mL / OUT: 100 mL / NET: -100 mL        PHYSICAL EXAM:  General: NAD on HFNC; obese.  Eyes: conjunctiva and sclera clear  ENMT: Atraumatic, Normocephalic  Respiratory: Bilateral poor air entry  Cardiovascular: S1S2+; no m/r/g  Gastrointestinal: Soft, Non-tender, Nondistended; Bowel sounds present,   : daley's cath with yellowish urine.  Neuro:  confused.  Ext:  vascular skin changes of both legs; No Cyanosis  Skin: No visible rashes  Dialysis Access: Rt femoral shiley      MEDICATIONS:  MEDICATIONS  (STANDING):  albuterol    0.083% 2.5 milliGRAM(s) Nebulizer every 6 hours  albuterol    90 MICROgram(s) HFA Inhaler 1 Puff(s) Inhalation every 4 hours  cefTRIAXone   IVPB 2000 milliGRAM(s) IV Intermittent every 24 hours  chlorhexidine 2% Cloths 1 Application(s) Topical <User Schedule>  collagenase Ointment 1 Application(s) Topical two times a day  epoetin madyson-epbx (RETACRIT) Injectable 8000 Unit(s) IV Push <User Schedule>  finasteride 5 milliGRAM(s) Oral daily  heparin   Injectable 5000 Unit(s) SubCutaneous every 8 hours  levETIRAcetam  IVPB 500 milliGRAM(s) IV Intermittent every 12 hours  levothyroxine 125 MICROGram(s) Oral daily  mupirocin 2% Ointment 1 Application(s) Both Nostrils two times a day  pantoprazole   Suspension 40 milliGRAM(s) Oral daily  polyethylene glycol 3350 17 Gram(s) Oral at bedtime  risperiDONE   Tablet 2 milliGRAM(s) Oral daily  senna Syrup 10 milliLiter(s) Oral at bedtime  sevelamer carbonate Powder 800 milliGRAM(s) Enteral Tube three times a day  tamsulosin 0.4 milliGRAM(s) Oral at bedtime  valproic  acid Syrup 750 milliGRAM(s) Oral every 6 hours  zinc oxide 20% Ointment 1 Application(s) Topical two times a day    MEDICATIONS  (PRN):  bisacodyl Suppository 10 milliGRAM(s) Rectal daily PRN Constipation          LABS:                        7.6    10.73 )-----------( 161      ( 06 Dec 2022 04:16 )             25.3     12-06    149<H>  |  112<H>  |  76<H>  ----------------------------<  102<H>  4.1   |  31  |  3.56<H>    Ca    9.4      06 Dec 2022 04:16  Phos  4.0     12-06  Mg     2.3     12-06    TPro  5.8<L>  /  Alb  1.8<L>  /  TBili  0.2  /  DBili  x   /  AST  13  /  ALT  12  /  AlkPhos  71  12-06        Magnesium, Serum: 2.3 mg/dL (12-06 @ 04:16)  Phosphorus Level, Serum: 4.0 mg/dL (12-06 @ 04:16)    Urine studies    PTH and Vit D:         NEPHROLOGY MEDICAL CARE, [facility] - Dr. Taj Valenzuela/ Dr. Hazel Solis/ Dr. Cosmo Root/ Dr. Fang Gifford    Patient was seen and examined at bedside.    CC: patient on bipap and still non-verbal.    Vital Signs Last 24 Hrs  T(C): 37.3 (06 Dec 2022 08:00), Max: 37.6 (06 Dec 2022 05:25)  T(F): 99.2 (06 Dec 2022 08:00), Max: 99.6 (06 Dec 2022 05:25)  HR: 95 (06 Dec 2022 08:00) (67 - 104)  BP: 128/69 (06 Dec 2022 08:00) (101/53 - 141/52)  BP(mean): 85 (06 Dec 2022 08:00) (57 - 85)  RR: 25 (06 Dec 2022 08:00) (15 - 35)  SpO2: 94% (06 Dec 2022 08:00) (82% - 100%)    Parameters below as of 06 Dec 2022 08:00  Patient On (Oxygen Delivery Method): BiPAP/CPAP        12-05 @ 07:01  -  12-06 @ 07:00  --------------------------------------------------------  IN: 1025 mL / OUT: 1670 mL / NET: -645 mL    12-06 @ 07:01 - 12-06 @ 09:02  --------------------------------------------------------  IN: 0 mL / OUT: 100 mL / NET: -100 mL        PHYSICAL EXAM:  General: NAD on bipap; obese.  Eyes: conjunctiva and sclera clear  ENMT: Atraumatic, Normocephalic; NGT  Respiratory: Bilateral poor air entry  Cardiovascular: S1S2+; no m/r/g  Gastrointestinal: Soft, Non-tender, Nondistended; Bowel sounds present,   : daley's cath with yellowish urine.  Neuro:  confused.  Ext:  vascular skin changes of both legs; No Cyanosis  Skin: No visible rashes  Dialysis Access: Rt femoral shiley      MEDICATIONS:  MEDICATIONS  (STANDING):  albuterol    0.083% 2.5 milliGRAM(s) Nebulizer every 6 hours  albuterol    90 MICROgram(s) HFA Inhaler 1 Puff(s) Inhalation every 4 hours  cefTRIAXone   IVPB 2000 milliGRAM(s) IV Intermittent every 24 hours  chlorhexidine 2% Cloths 1 Application(s) Topical <User Schedule>  collagenase Ointment 1 Application(s) Topical two times a day  epoetin madyson-epbx (RETACRIT) Injectable 8000 Unit(s) IV Push <User Schedule>  finasteride 5 milliGRAM(s) Oral daily  heparin   Injectable 5000 Unit(s) SubCutaneous every 8 hours  levETIRAcetam  IVPB 500 milliGRAM(s) IV Intermittent every 12 hours  levothyroxine 125 MICROGram(s) Oral daily  mupirocin 2% Ointment 1 Application(s) Both Nostrils two times a day  pantoprazole   Suspension 40 milliGRAM(s) Oral daily  polyethylene glycol 3350 17 Gram(s) Oral at bedtime  risperiDONE   Tablet 2 milliGRAM(s) Oral daily  senna Syrup 10 milliLiter(s) Oral at bedtime  sevelamer carbonate Powder 800 milliGRAM(s) Enteral Tube three times a day  tamsulosin 0.4 milliGRAM(s) Oral at bedtime  valproic  acid Syrup 750 milliGRAM(s) Oral every 6 hours  zinc oxide 20% Ointment 1 Application(s) Topical two times a day    MEDICATIONS  (PRN):  bisacodyl Suppository 10 milliGRAM(s) Rectal daily PRN Constipation          LABS:                        7.6    10.73 )-----------( 161      ( 06 Dec 2022 04:16 )             25.3     12-06    149<H>  |  112<H>  |  76<H>  ----------------------------<  102<H>  4.1   |  31  |  3.56<H>    Ca    9.4      06 Dec 2022 04:16  Phos  4.0     12-06  Mg     2.3     12-06    TPro  5.8<L>  /  Alb  1.8<L>  /  TBili  0.2  /  DBili  x   /  AST  13  /  ALT  12  /  AlkPhos  71  12-06        Magnesium, Serum: 2.3 mg/dL (12-06 @ 04:16)  Phosphorus Level, Serum: 4.0 mg/dL (12-06 @ 04:16)    Urine studies    PTH and Vit D:

## 2022-12-06 NOTE — PROGRESS NOTE ADULT - ASSESSMENT
Pneumonia - CAP  Resp failure  fevers - resolved  Leukocytosis - decreased  rash - on legs is dried up      Plan - Cont Rocephin 1 gm iv q24hrs  Time spent - 32 mins

## 2022-12-06 NOTE — PROGRESS NOTE ADULT - ATTENDING COMMENTS
62 year old morbid obese male, from [facility], has past medical hx of ambulatory dysfunction, ACD, bipolar disorder, BPH, CKD, HLD, PVD, COPD not on oxygen, hypothyroidism, RBBB, lymphedema, CHF (last echo 2/22 normal EF, GIDD). Presented for hypotension 73/37 mmHg and bradycardia (40's). Patient admitted to ICU for shock and acute encephalopathy. Now intubated on mechanical ventilation.     DX:  - Shock - septic vs hypovolemia  - Bilateral lower cellulitis   - Acute encephalopathy   - Sinus bradycardia  - HAGMA   - SHEYLA on CKD  - Anemia   - COPD not on oxygen at home   - HLD   - BPH  - Hypothyroidism   - Mood disorder       Plan:  - No further myoclonic activity this morning   - Cont. Keppra and VPA for now  - EEG with out seizures  - Neurology consult noted, recommend prolonged EEG monitoring  - CT head unremarkable   - Awake this morning  - Tolerating SBT  - Extubated today   - Monitor respiratory status   - Cont. antibiotics  - CT scan showing basilar pneumonia   - ID consult appreciated  - Cultures negative   - Monitor renal function  - Viera for urinary obstruction   - HD as per nephrology   - S/P 1  u PRBC  - Tube feedings via NGT   - Cont. home psych medications  - Cont. Levothyroxine  - DVT and stress ulcer prophylaxis  - Cont. ICU care for now.

## 2022-12-06 NOTE — PROGRESS NOTE ADULT - ASSESSMENT
1. SHEYLA due to ATN.  First HD 12/2. s/p shiley on 12/2. dialysis started since Scr not improved and remained at 5s.  -Scr slowly increasing with stable electrolytes and good urine output. will hold off HD today and monitor renal recovery.   -Adjust meds to eGFR and avoid IV Gadolinium contrast,NSAIDs, and phosphate enema.  -Monitor I/O's daily.   -Monitor SMA daily.  2. CKD stage 4 most likely due to ischemic nephropathy  -baseline scr around 3.7mg/dL in july 2022.  -Keep patient euvolemic and renal diet  -Avoid Nephrotoxic Meds/ Agents such as (NSAIDs, IV contrast, Aminoglycosides such as gentamicin, -Gadolinium contrast, Phosphate containing enemas, etc..)  -Adjust Medications according to eGFR  3. Hypotension possible sepsis  -bp is stable and off pressor.  4. Mineral Bone Disease:  -phos is improving and PTH intact is 400.  -continue calcitriol 0.25mcg daily.  5. HAGMA and respiratory acidosis:  -abg noted.   -patient has respiratory acidosis and managed by ICU.   6. Bradycardia due to unclear etiology  -improving heart rate.  -trend troponins  -TSH is nl  7. Pulm:  -s/p intubated on 11/30 for worsening respiratory support.  -plan as per MICU.   8. Anemia of ESRD:  -iron panel and ferritin is acceptable.   -continue Epogen 8000units IV/SC TIW  -F/u CBC daily  -transfuse if HB < 7.0.  9. Encephalopathy:  -video EEG shows no seizures.      Patient is critically ill. Time Spent: 35mins.  Discussed the assessment and plan with ICU Team/Nurse   1. SHEYLA due to ATN.  First HD 12/2. s/p riosley on 12/2. dialysis started since Scr not improved and remained at 5s.  -Scr slowly increasing with stable electrolytes and good urine output. will hold off HD today and monitor renal recovery.   -Adjust meds to eGFR and avoid IV Gadolinium contrast,NSAIDs, and phosphate enema.  -Monitor I/O's daily.   -Monitor SMA daily.  2. CKD stage 4 most likely due to ischemic nephropathy  -baseline scr around 3.7mg/dL in july 2022.  -Keep patient euvolemic and renal diet  -Avoid Nephrotoxic Meds/ Agents such as (NSAIDs, IV contrast, Aminoglycosides such as gentamicin, -Gadolinium contrast, Phosphate containing enemas, etc..)  -Adjust Medications according to eGFR  3. Hypotension possible sepsis  -bp is stable and off pressor.  4. Mineral Bone Disease:  -phos is improving and PTH intact is 400.  -continue calcitriol 0.25mcg daily.  5. HAGMA and respiratory acidosis:  -abg noted.   -patient has respiratory acidosis and managed by ICU.   6. Bradycardia due to unclear etiology  -improving heart rate.  -trend troponins  -TSH is nl  7. Pulm:  -s/p intubated on 11/30 for worsening respiratory support.  -plan as per MICU.   8. Anemia of ESRD:  -iron panel and ferritin is acceptable.   -continue Epogen 8000units IV/SC TIW  -F/u CBC daily  -transfuse if HB < 7.0.  9. Encephalopathy:  -video EEG shows no seizures.    10. Hypernatremia due to water deficit and insensible losses. Pt is clinically euvolemic.   -start free water 300 q8hrs.   -Monitor I/O's. Check Serum Na Daily. Avoid high solute intake diet and sodium bicarbonate infuse. Avoid overcorrection of NA (8-10meq/day)      Patient is critically ill. Time Spent: 35mins.  Discussed the assessment and plan with ICU Team/Nurse

## 2022-12-06 NOTE — PROGRESS NOTE ADULT - SUBJECTIVE AND OBJECTIVE BOX
ICU VISIT  62y Male    Meds:  cefTRIAXone   IVPB 2000 milliGRAM(s) IV Intermittent every 24 hours    Allergies    No Known Allergies    Intolerances        VITALS:  Vital Signs Last 24 Hrs  T(C): 36.3 (06 Dec 2022 16:00), Max: 37.6 (06 Dec 2022 05:25)  T(F): 97.4 (06 Dec 2022 16:00), Max: 99.6 (06 Dec 2022 05:25)  HR: 84 (06 Dec 2022 18:00) (67 - 115)  BP: 128/77 (06 Dec 2022 18:00) (96/45 - 144/64)  BP(mean): 94 (06 Dec 2022 18:00) (57 - 94)  RR: 18 (06 Dec 2022 18:00) (17 - 35)  SpO2: 92% (06 Dec 2022 18:00) (82% - 100%)    Parameters below as of 06 Dec 2022 18:00  Patient On (Oxygen Delivery Method): BiPAP/CPAP    O2 Concentration (%): 100    LABS/DIAGNOSTIC TESTS:                          7.6    10.73 )-----------( 161      ( 06 Dec 2022 04:16 )             25.3         12-06    149<H>  |  112<H>  |  76<H>  ----------------------------<  102<H>  4.1   |  31  |  3.56<H>    Ca    9.4      06 Dec 2022 04:16  Phos  4.0     12-06  Mg     2.3     12-06    TPro  5.8<L>  /  Alb  1.8<L>  /  TBili  0.2  /  DBili  x   /  AST  13  /  ALT  12  /  AlkPhos  71  12-06      LIVER FUNCTIONS - ( 06 Dec 2022 04:16 )  Alb: 1.8 g/dL / Pro: 5.8 g/dL / ALK PHOS: 71 U/L / ALT: 12 U/L DA / AST: 13 U/L / GGT: x             CULTURES: ET Tube ET Tube  12-01 @ 17:40   Normal Respiratory Ivelisse present  --    Rare polymorphonuclear leukocytes per low power field  Rare Squamous epithelial cells per low power field  No organisms seen per oil power field      .Blood Blood  12-01 @ 00:15   No Growth Final  --  --      Clean Catch Clean Catch (Midstream)  12-01 @ 00:14   No growth  --  --            RADIOLOGY:      ROS:  [  ] UNABLE TO ELICIT ICU VISIT  62y Male who is in the ICU but is on BIPAP, he is on 100% oxygen. He is arousable but sleepy, he has a slight cough, and is still SOB, he has no chest pain or sputum production, no fevers , chills , no nausea, vomiting or diarrhea, no other complaints except for a dry mouth. He completed his Azithromycin and is on Rocephin alone.    Meds:  cefTRIAXone   IVPB 2000 milliGRAM(s) IV Intermittent every 24 hours    Allergies    No Known Allergies    Intolerances        VITALS:  Vital Signs Last 24 Hrs  T(C): 36.3 (06 Dec 2022 16:00), Max: 37.6 (06 Dec 2022 05:25)  T(F): 97.4 (06 Dec 2022 16:00), Max: 99.6 (06 Dec 2022 05:25)  HR: 84 (06 Dec 2022 18:00) (67 - 115)  BP: 128/77 (06 Dec 2022 18:00) (96/45 - 144/64)  BP(mean): 94 (06 Dec 2022 18:00) (57 - 94)  RR: 18 (06 Dec 2022 18:00) (17 - 35)  SpO2: 92% (06 Dec 2022 18:00) (82% - 100%)    Parameters below as of 06 Dec 2022 18:00  Patient On (Oxygen Delivery Method): BiPAP/CPAP    O2 Concentration (%): 100    LABS/DIAGNOSTIC TESTS:                          7.6    10.73 )-----------( 161      ( 06 Dec 2022 04:16 )             25.3         12-06    149<H>  |  112<H>  |  76<H>  ----------------------------<  102<H>  4.1   |  31  |  3.56<H>    Ca    9.4      06 Dec 2022 04:16  Phos  4.0     12-06  Mg     2.3     12-06    TPro  5.8<L>  /  Alb  1.8<L>  /  TBili  0.2  /  DBili  x   /  AST  13  /  ALT  12  /  AlkPhos  71  12-06      LIVER FUNCTIONS - ( 06 Dec 2022 04:16 )  Alb: 1.8 g/dL / Pro: 5.8 g/dL / ALK PHOS: 71 U/L / ALT: 12 U/L DA / AST: 13 U/L / GGT: x             CULTURES: ET Tube ET Tube  12-01 @ 17:40   Normal Respiratory Ivelisse present  --    Rare polymorphonuclear leukocytes per low power field  Rare Squamous epithelial cells per low power field  No organisms seen per oil power field      .Blood Blood  12-01 @ 00:15   No Growth Final  --  --      Clean Catch Clean Catch (Midstream)  12-01 @ 00:14   No growth  --  --            RADIOLOGY:      ROS:  [  ] UNABLE TO ELICIT

## 2022-12-06 NOTE — PROGRESS NOTE ADULT - SUBJECTIVE AND OBJECTIVE BOX
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 jerks, and less likely to represent epileptic seizures given absence of seizures on continuous EEG      RECOMMENDATIONS:    - Continue Valproic acid syrup 750mg PO QID given low valproic acid level       - Valproic acid level is within normal range at this dosage; no need to further adjust dosage at this time    - Okay to discontinue Levetiracetam 500mg PO BID in the setting of renal disease    - Respiratory management and infectious workup as per primary team    - DVT ppx: SCDs, Heparin            NOTE TO BE COMPLETED - PLEASE REFER TO ABOVE ONLY AND IGNORE INFORMATION BELOW    ******************************    HPI:  62 year old morbid obese male, from [facility], has past medical hx of ambulatory dysfunction, ACD, bipolar disorder, BPH, CKD, HLD, PVD,  COPD not on oxygen, hypothyroidism, RBBB, lymphedema, CHF (last echo 2/22 normal EF, GIDD) was BIBEMS for hypotension 73/37 mmHg and bradycardia (40's). Story obtained from ED provider note as patient is not able to provide any history. Patient received 500 cc NS and one dose of atropine for bradycardia. Patient very somnolent during interview but arousable to verbal stimuli.  (30 Nov 2022 18:39)      NEURO HPI:      INTERVAL HISTORY:      MEDICATIONS:  albuterol    0.083% 2.5 milliGRAM(s) Nebulizer every 6 hours  albuterol    90 MICROgram(s) HFA Inhaler 1 Puff(s) Inhalation every 4 hours  bisacodyl Suppository 10 milliGRAM(s) Rectal daily PRN  cefTRIAXone   IVPB 2000 milliGRAM(s) IV Intermittent every 24 hours  chlorhexidine 2% Cloths 1 Application(s) Topical <User Schedule>  collagenase Ointment 1 Application(s) Topical two times a day  epoetin madyson-epbx (RETACRIT) Injectable 8000 Unit(s) IV Push <User Schedule>  finasteride 5 milliGRAM(s) Oral daily  heparin   Injectable 5000 Unit(s) SubCutaneous every 8 hours  levothyroxine 125 MICROGram(s) Oral daily  mupirocin 2% Ointment 1 Application(s) Both Nostrils two times a day  pantoprazole   Suspension 40 milliGRAM(s) Oral daily  polyethylene glycol 3350 17 Gram(s) Oral at bedtime  risperiDONE   Tablet 2 milliGRAM(s) Oral daily  senna Syrup 10 milliLiter(s) Oral at bedtime  sevelamer carbonate Powder 800 milliGRAM(s) Enteral Tube three times a day  tamsulosin 0.4 milliGRAM(s) Oral at bedtime  valproic  acid Syrup 750 milliGRAM(s) Oral every 6 hours  zinc oxide 20% Ointment 1 Application(s) Topical two times a day      ALLERGIES:  No Known Allergies      REVIEW OF SYSTEMS:  Fourteen systems reviewed and negative except as in HPI / Interval History.        OBJECTIVE:  Vital Signs Last 24 Hrs  T(C): 36.3 (06 Dec 2022 16:00), Max: 37.6 (06 Dec 2022 05:25)  T(F): 97.4 (06 Dec 2022 16:00), Max: 99.6 (06 Dec 2022 05:25)  HR: 86 (06 Dec 2022 19:00) (67 - 115)  BP: 110/71 (06 Dec 2022 19:00) (96/45 - 144/64)  BP(mean): 83 (06 Dec 2022 19:00) (57 - 94)  RR: 15 (06 Dec 2022 19:00) (15 - 35)  SpO2: 100% (06 Dec 2022 19:00) (82% - 100%)    Parameters below as of 06 Dec 2022 19:00  Patient On (Oxygen Delivery Method): BiPAP/CPAP    O2 Concentration (%): 100    General Examination:  General: No acute distress  HEENT: Atraumatic, Normocephalic  Respiratory: CTA B/l.  No crackles, rhonchi, or wheezes.  Cardiovascular: RRR.  Normal S1 & S2.  Normal b/l radial and pedal pulses.    Neurological Examination:  General / Mental Status: AAO x 3.  No aphasia or dysarthria.  Naming and repetition intact.  Cranial Nerves: VFF x 4.  PERRL.  EOMI x 2, No nystagmus or diplopia.  B/l V1-V3 equal and intact to light touch and pinprick.  Symmetric facial movement and palate elevation.  B/l hearing equal to finger rub.  5/5 strength with b/l sternocleidomastoid & trapezius.  Midline tongue protrusion, with no atrophy or fasciculations.  Motor: Normal bulk & tone in all four extremities.  5/5 strength throughout all four extremities.  No downward drift, rigidity, spasticity, or tremors in any of the four extremities.  Sensory: Intact to light touch and pinprick in all four extremities.  Negative Romberg.  Reflex: 2+ and symmetric at b/l biceps, triceps, brachioradialis, patellae, and ankles.  Downgoing toes b/l.  Coordination: No dysmetria with b/l finger-to-nose and heel raise tests.  Symmetric rapid alternating movements b/l.  Gait: Normal, narrow-based gait.  No difficulty with tiptoe, heel, and tandem gaits.        LABORATORY VALUES:                          7.6    10.73 )-----------( 161      ( 06 Dec 2022 04:16 )             25.3       12-06    149<H>  |  112<H>  |  76<H>  ----------------------------<  102<H>  4.1   |  31  |  3.56<H>    Ca    9.4      06 Dec 2022 04:16  Phos  4.0     12-06  Mg     2.3     12-06    TPro  5.8<L>  /  Alb  1.8<L>  /  TBili  0.2  /  DBili  x   /  AST  13  /  ALT  12  /  AlkPhos  71  12-06    Glucose Trend  12-06-22 @ 16:33   -  -- -- 90 12-06-22 @ 14:13   -  -- -- 94  12-06-22 @ 04:16   -  -- 102<H> --  12-05-22 @ 05:09   -  -- 94 --  12-05-22 @ 00:10   -  -- -- 88  12-04-22 @ 14:25   -  -- 105<H> --  12-04-22 @ 03:50   -  -- 128<H> --  12-03-22 @ 22:32   -  -- -- 138<H>                    NEUROIMAGING:          Please contact the Neurology consult service with any neurological questions.      Eric Rose MD   of Neurology  [facility]         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 jerks, and less likely to represent epileptic seizures given absence of seizures on continuous EEG      RECOMMENDATIONS:    - Continue Valproic acid syrup 750mg PO QID given low valproic acid level       - Valproic acid level is within normal range at this dosage; no need to further adjust dosage at this time    - Okay to discontinue Levetiracetam 500mg PO BID in the setting of renal disease    - Respiratory management and infectious workup as per primary team    - DVT ppx: SCDs, Heparin          ******************************    HPI:  62 year old morbid obese male, from [facility], has past medical hx of ambulatory dysfunction, ACD, bipolar disorder, BPH, CKD, HLD, PVD,  COPD not on oxygen, hypothyroidism, RBBB, lymphedema, CHF (last echo 2/22 normal EF, GIDD) was BIBEMS for hypotension 73/37 mmHg and bradycardia (40's). Story obtained from ED provider note as patient is not able to provide any history. Patient received 500 cc NS and one dose of atropine for bradycardia. Patient very somnolent during interview but arousable to verbal stimuli.  (30 Nov 2022 18:39)      NEURO HPI:  62 LHM presenting after experiencing bradycardia and hypotension, and then observed to have shaking of both arms and hands while being managed in the ICU.      INTERVAL HISTORY:  The patient has been extubated and is currently on BiPAP. Hehas had only occasional shaking in both arms overnight.      MEDICATIONS:  albuterol    0.083% 2.5 milliGRAM(s) Nebulizer every 6 hours  albuterol    90 MICROgram(s) HFA Inhaler 1 Puff(s) Inhalation every 4 hours  bisacodyl Suppository 10 milliGRAM(s) Rectal daily PRN  cefTRIAXone   IVPB 2000 milliGRAM(s) IV Intermittent every 24 hours  chlorhexidine 2% Cloths 1 Application(s) Topical <User Schedule>  collagenase Ointment 1 Application(s) Topical two times a day  epoetin madyson-epbx (RETACRIT) Injectable 8000 Unit(s) IV Push <User Schedule>  finasteride 5 milliGRAM(s) Oral daily  heparin   Injectable 5000 Unit(s) SubCutaneous every 8 hours  levothyroxine 125 MICROGram(s) Oral daily  mupirocin 2% Ointment 1 Application(s) Both Nostrils two times a day  pantoprazole   Suspension 40 milliGRAM(s) Oral daily  polyethylene glycol 3350 17 Gram(s) Oral at bedtime  risperiDONE   Tablet 2 milliGRAM(s) Oral daily  senna Syrup 10 milliLiter(s) Oral at bedtime  sevelamer carbonate Powder 800 milliGRAM(s) Enteral Tube three times a day  tamsulosin 0.4 milliGRAM(s) Oral at bedtime  valproic  acid Syrup 750 milliGRAM(s) Oral every 6 hours  zinc oxide 20% Ointment 1 Application(s) Topical two times a day      ALLERGIES:  No Known Allergies      REVIEW OF SYSTEMS:  Fourteen systems reviewed and negative except as in HPI / Interval History.        OBJECTIVE:  Vital Signs Last 24 Hrs  T(C): 36.3 (06 Dec 2022 16:00), Max: 37.6 (06 Dec 2022 05:25)  T(F): 97.4 (06 Dec 2022 16:00), Max: 99.6 (06 Dec 2022 05:25)  HR: 86 (06 Dec 2022 19:00) (67 - 115)  BP: 110/71 (06 Dec 2022 19:00) (96/45 - 144/64)  BP(mean): 83 (06 Dec 2022 19:00) (57 - 94)  RR: 15 (06 Dec 2022 19:00) (15 - 35)  SpO2: 100% (06 Dec 2022 19:00) (82% - 100%)  Parameters below as of 06 Dec 2022 19:00  Patient On (Oxygen Delivery Method): BiPAP/CPAP  O2 Concentration (%): 100    General Examination:  General: No acute distress  HEENT: Atraumatic, Normocephalic  Respiratory: CTA B/l.  No crackles, rhonchi, or wheezes.  Cardiovascular: RRR.  Normal S1 & S2.  Normal b/l radial and pedal pulses.    Neurological Examination:  General / Mental Status: AAO x 1 (only to name).  No aphasia or dysarthria.  Cranial Nerves: B/l blink to threat present.  PERRL.  EOMI x 2, No nystagmus.  B/l V1-V3 equal and intact to light touch and pinprick.  Symmetric facial movement and palate elevation.  B/l hearing equal to finger rub.  At least 3/5 strength with b/l sternocleidomastoid & trapezius.  Midline tongue protrusion.  Motor: Normal bulk & tone in all four extremities.  At least 3/5 strength throughout all four extremities, limited by poor effort.  No downward drift, rigidity, spasticity, or tremors in any of the four extremities.  Sensory: Intact to light touch and pinprick in all four extremities.  Reflex: 1+ and symmetric at b/l biceps, triceps, brachioradialis, patellae, and ankles.  Downgoing toes b/l.  Coordination: No dysmetria with b/l finger-to-nose and heel raise tests.  Gait and Romberg sing testing deferred while patient is on BiPAP.          LABORATORY VALUES:                          7.6    10.73 )-----------( 161      ( 06 Dec 2022 04:16 )             25.3       12-06    149<H>  |  112<H>  |  76<H>  ----------------------------<  102<H>  4.1   |  31  |  3.56<H>    Ca    9.4      06 Dec 2022 04:16  Phos  4.0     12-06  Mg     2.3     12-06    TPro  5.8<L>  /  Alb  1.8<L>  /  TBili  0.2  /  DBili  x   /  AST  13  /  ALT  12  /  AlkPhos  71  12-06    Glucose Trend  12-06-22 @ 16:33   -  -- -- 90  12-06-22 @ 14:13   -  -- -- 94  12-06-22 @ 04:16   -  -- 102<H> --  12-05-22 @ 05:09   -  -- 94 --  12-05-22 @ 00:10   -  -- -- 88  12-04-22 @ 14:25   -  -- 105<H> --  12-04-22 @ 03:50   -  -- 128<H> --  12-03-22 @ 22:32   -  -- -- 138<H>          NEUROIMAGING:      CT Head (12/1/22):  - No acute intracranial abnormality  - Chronic microvascular changes  - Mild diffuse atrophy  - Incidental inflammatory changes in sinuses      Routine EEG (12/3/22):  - Moderate generalized background slowing  - No seizure tendency identified      Repeat CT Head (12/4/22):  - No acute intracranial abnormality  - Mild chronic microvascular changes  - Mild diffuse atrophy      Routine & Continuous EEG (12/4/22 - 12/5/22):  - Mild/Moderate generalized background slowing  - No seizure tendency identified            Please contact the Neurology consult service with any neurological questions.      Eric Rose MD   of Neurology  [facility]         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 jerks, and less likely to represent epileptic seizures given absence of seizures on continuous EEG      RECOMMENDATIONS:    - Continue Valproic acid syrup 750mg PO QID given low valproic acid level       - Valproic acid level is within normal range at this dosage; no need to further adjust dosage at this time    - Okay to discontinue Levetiracetam 500mg PO BID in the setting of renal disease    - Respiratory management and infectious workup as per primary team    - DVT ppx: SCDs, Heparin          ******************************    HPI:  62 year old morbid obese male, from [facility], has past medical hx of ambulatory dysfunction, ACD, bipolar disorder, BPH, CKD, HLD, PVD,  COPD not on oxygen, hypothyroidism, RBBB, lymphedema, CHF (last echo 2/22 normal EF, GIDD) was BIBEMS for hypotension 73/37 mmHg and bradycardia (40's). Story obtained from ED provider note as patient is not able to provide any history. Patient received 500 cc NS and one dose of atropine for bradycardia. Patient very somnolent during interview but arousable to verbal stimuli.  (30 Nov 2022 18:39)      NEURO HPI:  62 LHM presenting after experiencing bradycardia and hypotension, and then observed to have shaking of both arms and hands while being managed in the ICU.      INTERVAL HISTORY:  The patient has been extubated and is currently on BiPAP. Hehas had only occasional shaking in both arms overnight.      MEDICATIONS:  albuterol    0.083% 2.5 milliGRAM(s) Nebulizer every 6 hours  albuterol    90 MICROgram(s) HFA Inhaler 1 Puff(s) Inhalation every 4 hours  bisacodyl Suppository 10 milliGRAM(s) Rectal daily PRN  cefTRIAXone   IVPB 2000 milliGRAM(s) IV Intermittent every 24 hours  chlorhexidine 2% Cloths 1 Application(s) Topical <User Schedule>  collagenase Ointment 1 Application(s) Topical two times a day  epoetin madyson-epbx (RETACRIT) Injectable 8000 Unit(s) IV Push <User Schedule>  finasteride 5 milliGRAM(s) Oral daily  heparin   Injectable 5000 Unit(s) SubCutaneous every 8 hours  levothyroxine 125 MICROGram(s) Oral daily  mupirocin 2% Ointment 1 Application(s) Both Nostrils two times a day  pantoprazole   Suspension 40 milliGRAM(s) Oral daily  polyethylene glycol 3350 17 Gram(s) Oral at bedtime  risperiDONE   Tablet 2 milliGRAM(s) Oral daily  senna Syrup 10 milliLiter(s) Oral at bedtime  sevelamer carbonate Powder 800 milliGRAM(s) Enteral Tube three times a day  tamsulosin 0.4 milliGRAM(s) Oral at bedtime  valproic  acid Syrup 750 milliGRAM(s) Oral every 6 hours  zinc oxide 20% Ointment 1 Application(s) Topical two times a day      ALLERGIES:  No Known Allergies      REVIEW OF SYSTEMS:  Fourteen systems reviewed and negative except as in HPI / Interval History.        OBJECTIVE:  Vital Signs Last 24 Hrs  T(C): 36.3 (06 Dec 2022 16:00), Max: 37.6 (06 Dec 2022 05:25)  T(F): 97.4 (06 Dec 2022 16:00), Max: 99.6 (06 Dec 2022 05:25)  HR: 86 (06 Dec 2022 19:00) (67 - 115)  BP: 110/71 (06 Dec 2022 19:00) (96/45 - 144/64)  BP(mean): 83 (06 Dec 2022 19:00) (57 - 94)  RR: 15 (06 Dec 2022 19:00) (15 - 35)  SpO2: 100% (06 Dec 2022 19:00) (82% - 100%)  Parameters below as of 06 Dec 2022 19:00  Patient On (Oxygen Delivery Method): BiPAP/CPAP  O2 Concentration (%): 100    General Examination:  General: No acute distress  HEENT: Atraumatic, Normocephalic  Respiratory: CTA B/l.  No crackles, rhonchi, or wheezes.  Cardiovascular: RRR.  Normal S1 & S2.  Normal b/l radial and pedal pulses.    Neurological Examination:  General / Mental Status: AAO x 1 (only to name).  No aphasia or dysarthria.  Cranial Nerves: B/l blink to threat present.  PERRL.  EOMI x 2, No nystagmus.  B/l V1-V3 equal and intact to light touch and pinprick.  Symmetric facial movement and palate elevation.  B/l hearing equal to finger rub.  At least 3/5 strength with b/l sternocleidomastoid & trapezius.  Midline tongue protrusion.  Motor: Normal bulk & tone in all four extremities.  At least 3/5 strength throughout all four extremities, limited by poor effort.  Downward drift to bed present in b/l lower extremities only.  No rigidity, spasticity, or tremors in any of the four extremities.  Sensory: Intact to light touch and pinprick in all four extremities.  Reflex: 1+ and symmetric at b/l biceps, triceps, brachioradialis, patellae, and ankles.  Downgoing toes b/l.  Coordination: No dysmetria with b/l finger-to-nose and heel raise tests.  Gait and Romberg sign testing deferred while patient is on BiPAP.          LABORATORY VALUES:                          7.6    10.73 )-----------( 161      ( 06 Dec 2022 04:16 )             25.3       12-06    149<H>  |  112<H>  |  76<H>  ----------------------------<  102<H>  4.1   |  31  |  3.56<H>    Ca    9.4      06 Dec 2022 04:16  Phos  4.0     12-06  Mg     2.3     12-06    TPro  5.8<L>  /  Alb  1.8<L>  /  TBili  0.2  /  DBili  x   /  AST  13  /  ALT  12  /  AlkPhos  71  12-06    Glucose Trend  12-06-22 @ 16:33   -  -- -- 90  12-06-22 @ 14:13   -  -- -- 94  12-06-22 @ 04:16   -  -- 102<H> --  12-05-22 @ 05:09   -  -- 94 --  12-05-22 @ 00:10   -  -- -- 88  12-04-22 @ 14:25   -  -- 105<H> --  12-04-22 @ 03:50   -  -- 128<H> --  12-03-22 @ 22:32   -  -- -- 138<H>          NEUROIMAGING:      CT Head (12/1/22):  - No acute intracranial abnormality  - Chronic microvascular changes  - Mild diffuse atrophy  - Incidental inflammatory changes in sinuses      Routine EEG (12/3/22):  - Moderate generalized background slowing  - No seizure tendency identified      Repeat CT Head (12/4/22):  - No acute intracranial abnormality  - Mild chronic microvascular changes  - Mild diffuse atrophy      Routine & Continuous EEG (12/4/22 - 12/5/22):  - Mild/Moderate generalized background slowing  - No seizure tendency identified            Please contact the Neurology consult service with any neurological questions.      Eric Rose MD   of Neurology  [facility]

## 2022-12-06 NOTE — PROGRESS NOTE ADULT - ASSESSMENT
62 year old morbid obese male, from [facility], has past medical hx of ambulatory dysfunction, ACD, bipolar disorder, BPH, CKD, HLD, PVD, COPD not on oxygen, hypothyroidism, RBBB, lymphedema, CHF (last echo 2/22 normal EF, GIDD) was BIBEMS for hypotension 73/37 mmHg and bradycardia (40's). Patient admitted to ICU for shock and acute encephalopathy.     DX:  - Shock septic vs hypovolemia  - LE cellulitis   - Acute encephalopathy   - Sinus bradycardia  - HAGMA   - SHEYLA on CKD  - Anemia   - COPD not on oxygen at home   - HLD   - BPH  - Hypothyroidism   - Mood disorder       =================== Neuro============================  #Acute Encephalopathy; likely in the setting of acidemia, infection, uremia    Lethargic, fighting vent    Pain meds with Tylenol   CT Head negative for acute bleed/swelling  Off sedation  Alert and Oriented X2    #Seizure  - Home med includes valproic acid  - Patient is having repeated muscle spasms, improved on Versed but not fully stopped  - Still having muscle movements after dialysis which brought down BUN  - Started propofol instead of precedex  - Valproic acid level low, likely due to missed doses when patient was originally admitted.  - cont keppra and valproate as per neuro recs, prefer valproate as less nephrotoxic  - spot EEG 12/3 - moderate generalized slowing  - f/u 24 hr EEG (12/4)  [ ] Increase Valproic Acid to 750mg Q6  [ ] Valproic Acid Trough in AM     ================= Cardiovascular==========================  Sinus bradycardia  - EKG sinus bradycardia, old RBBB  - TTE 12/1 - LVEF 60-65%  - trops neg  - Given normal EF, no changes in EKG, cardiogenic shock less likely    Hypotension  - off pressers      ================- Pulm=================================  COPD  - Patient with hx of COPD not in exacerbation   - Chest X ray Showed bilateral patchy infiltrates, no concern for fluid overload    - Oxygen supplementation as needed to maintain SpO2 of 88-92%  - C/w home meds   - currently on high flow O2 (12/4), but pt may require Bipap      ==================ID===================================  Shock  - Concern for sepsis vs hypovolemic   - Empirical treatment with Cefepime for LE cellulitis and for pseudomonal coverage  - S/p 4L NS   - Started on pressors   F/u lactate 1.4, procalcitonin, Bcx, Ucx, CK NGTD  - ID consulted Dr Nolasco     ================= Nephro================================  SHEYLA on CKD  - On admission, patient with Cr 6.4>>5.6, baseline 3.7; back to [facility] today  - Worsening CKD most likely a combination of obstructive vs pre- renal   - Viera catheter placed in ED with 1000 cc of UO  - -58mL in the past 24 hours  - Avoid nephrotoxins, NSAIDS, ACEI and ARBS  - Monitor BMP daily  - Nephro Dr Valenzuela consulted  - S/p Dialysis 12/2 and 12/3, Creatnine near baseline, mental status improved  [ ] Follow up w/ nephro to see if shiley can be removed    HAGMA  - Most likely from uremia   - ABG 7.2/44/97/17 >>7.34/34/102/18  - Resume Bicarb 325 Q6 hours  - Nephro Dr Valenzuela consulted    =================GI====================================  NGT in place  Start tube feeds  Resume psych PO medications- Wellbutrin  Swallow evaluation  Bowel Regimen: Suppository; Impacted    ================ Heme==================================  Anemia   - Anemia of chronic disease   - Monitor CBC daily   - Hgb Stable no signs of active bleed  - 12/4 - Hb 7.0, s/p 1U PRBC  - f/u repeat CBC    =================Endocrine===============================  Hypothyroidism  - F/u TSH Normal   - C/w home meds when not NPO     ================= Skin/Catheters============================  Peripheral IV lines   Viera catheter    Sacral and Posterior Thigh Ulcers, Wound Care Consulted  [ ] f/u removal of shiley     =================Prophylaxis =============================  DVT prophylaxis with Heparin SQ daily   GI prophylaxis with PPI daily    ==================GOC==================================  FULL CODE    0 62 year old morbid obese male, from [facility], has past medical hx of ambulatory dysfunction, ACD, bipolar disorder, BPH, CKD, HLD, PVD, COPD not on oxygen, hypothyroidism, RBBB, lymphedema, CHF (last echo 2/22 normal EF, GIDD) was BIBEMS for hypotension 73/37 mmHg and bradycardia (40's). Patient admitted to ICU for shock and acute encephalopathy.     DX:  - Shock septic vs hypovolemia  - LE cellulitis   - Acute encephalopathy   - Sinus bradycardia  - HAGMA   - SHEYLA on CKD  - Anemia   - COPD not on oxygen at home   - HLD   - BPH  - Hypothyroidism   - Mood disorder       =================== Neuro============================  #Acute Encephalopathy; likely in the setting of acidemia, infection, uremia    Lethargic, fighting vent    Pain meds with Tylenol   CT Head negative for acute bleed/swelling  Off sedation  Alert and Oriented X2    #Seizure  - Home med includes valproic acid  - Patient is having repeated muscle spasms, improved on Versed but not fully stopped  - Still having muscle movements after dialysis which brought down BUN  - Started propofol instead of precedex  - Valproic acid level low, likely due to missed doses when patient was originally admitted.  - cont keppra and valproate as per neuro recs, prefer valproate as less nephrotoxic  - spot EEG 12/3 - moderate generalized slowing  - f/u 24 hr EEG (12/4)  - Valproic Acid to 750mg Q6  - Valproic Acid Trough in AM 54    ================= Cardiovascular==========================  Sinus bradycardia  - EKG sinus bradycardia, old RBBB  - TTE 12/1 - LVEF 60-65%  - trops neg  - Given normal EF, no changes in EKG, cardiogenic shock less likely    Hypotension  - off pressers      ================- Pulm=================================  COPD  - Patient with hx of COPD not in exacerbation   - Chest X ray Showed bilateral patchy infiltrates, no concern for fluid overload    - Oxygen supplementation as needed to maintain SpO2 of 88-92%  - C/w home meds   - On BiPAP, saturated 90s      ==================ID===================================  Shock  - Concern for sepsis vs hypovolemic   - Empirical treatment with Cefepime for LE cellulitis and for pseudomonal coverage  - S/p 4L NS   - Started on pressors   F/u lactate 1.4, procalcitonin, Bcx, Ucx, CK NGTD  - ID consulted Dr Nolasco     ================= Nephro================================  SHEYLA on CKD  - On admission, patient with Cr 6.4>>5.6, baseline 3.7; stable at baseline today  - Worsening CKD most likely a combination of obstructive vs pre- renal   - Viera catheter placed in ED with 1000 cc of UO  - -58mL in the past 24 hours  - Avoid nephrotoxins, NSAIDS, ACEI and ARBS  - Monitor BMP daily  - Nephro Dr Valenzuela consulted  - S/p Dialysis 12/2 and 12/3, Creatnine near baseline, mental status improved  [ ] Dialysis Today    HAGMA  - Most likely from uremia   - ABG 7.2/44/97/17 >>7.34/34/102/18  - Resume Bicarb 325 Q6 hours  - Nephro Dr Valenzuela consulted    =================GI====================================  NGT in place  Start tube feeds  Resume psych PO medications- Wellbutrin  Swallow evaluation  Bowel Regimen: Suppository; Impacted    ================ Heme==================================  Anemia   - Anemia of chronic disease   - Monitor CBC daily   - Hgb Stable no signs of active bleed    =================Endocrine===============================  Hypothyroidism  - F/u TSH Normal   - C/w home meds when not NPO     ================= Skin/Catheters============================  Peripheral IV lines   Viera catheter    Sacral and Posterior Thigh Ulcers, Wound Care Consulted  [ ] f/u removal of shiley     =================Prophylaxis =============================  DVT prophylaxis with Heparin SQ daily   GI prophylaxis with PPI daily    ==================GOC==================================  FULL CODE    62 year old morbid obese male, from [facility], has past medical hx of ambulatory dysfunction, ACD, bipolar disorder, BPH, CKD, HLD, PVD, COPD not on oxygen, hypothyroidism, RBBB, lymphedema, CHF (last echo 2/22 normal EF, GIDD) was BIBEMS for hypotension 73/37 mmHg and bradycardia (40's). Patient admitted to ICU for shock and acute encephalopathy.     DX:  - Shock septic vs hypovolemia  - LE cellulitis   - Acute encephalopathy   - Sinus bradycardia  - HAGMA   - SHEYLA on CKD  - Anemia   - COPD not on oxygen at home   - HLD   - BPH  - Hypothyroidism   - Mood disorder       =================== Neuro============================  #Acute Encephalopathy; likely in the setting of acidemia, infection, uremia    Lethargic, fighting vent    Pain meds with Tylenol   CT Head negative for acute bleed/swelling  Off sedation  Alert and Oriented X2    #Seizure  - Home med includes valproic acid  - Patient is having repeated muscle spasms, improved on Versed but not fully stopped  - Still having muscle movements after dialysis which brought down BUN  - Started propofol instead of precedex  - Valproic acid level low, likely due to missed doses when patient was originally admitted.  - cont keppra and valproate as per neuro recs, prefer valproate as less nephrotoxic  - spot EEG 12/3 - moderate generalized slowing  - f/u 24 hr EEG (12/4)  - Valproic Acid to 750mg Q6  - Valproic Acid Trough in AM 54    ================= Cardiovascular==========================  Sinus bradycardia  - EKG sinus bradycardia, old RBBB  - TTE 12/1 - LVEF 60-65%  - trops neg  - Given normal EF, no changes in EKG, cardiogenic shock less likely    Hypotension  - off pressers      ================- Pulm=================================  COPD  - Patient with hx of COPD not in exacerbation   - Chest X ray Showed bilateral patchy infiltrates, no concern for fluid overload    - Oxygen supplementation as needed to maintain SpO2 of 88-92%  - C/w home meds   - On BiPAP, saturated 90s; transition to high flow to assess mentation      ==================ID===================================  Shock  - Concern for sepsis vs hypovolemic   - Empirical treatment with Cefepime for LE cellulitis and for pseudomonal coverage  - S/p 4L NS   - Started on pressors   F/u lactate 1.4, procalcitonin, Bcx, Ucx, CK NGTD  - ID consulted Dr Nolasco     ================= Nephro================================  SHEYLA on CKD  - On admission, patient with Cr 6.4>>5.6, baseline 3.7; stable at baseline today  - Worsening CKD most likely a combination of obstructive vs pre- renal   - Viera catheter placed in ED with 1000 cc of UO  - -58mL in the past 24 hours  - Avoid nephrotoxins, NSAIDS, ACEI and ARBS  - Monitor BMP daily  - Nephro Dr Valenzuela consulted  - S/p Dialysis 12/2 and 12/3, Creatnine near baseline, mental status improved  Dialysis held today 12/6    HAGMA  - Most likely from uremia   - Nephro Dr Valenzuela consulted    =================GI====================================  NGT in place  Start tube feeds  Resume psych PO medications- Wellbutrin  Swallow evaluation  Bowel Regimen: Suppository; Impacted    ================ Heme==================================  Anemia   - Anemia of chronic disease   - Monitor CBC daily   - Hgb Stable no signs of active bleed    =================Endocrine===============================  Hypothyroidism  - F/u TSH Normal   - C/w home meds when not NPO     ================= Skin/Catheters============================  Peripheral IV lines   Viera catheter    Sacral and Posterior Thigh Ulcers, Wound Care Consulted  [ ] f/u removal of shiley     =================Prophylaxis =============================  DVT prophylaxis with Heparin SQ daily   GI prophylaxis with PPI daily    ==================GOC==================================  FULL CODE    62 year old morbid obese male, from [facility], has past medical hx of ambulatory dysfunction, ACD, bipolar disorder, BPH, CKD, HLD, PVD, COPD not on oxygen, hypothyroidism, RBBB, lymphedema, CHF (last echo 2/22 normal EF, GIDD) was BIBEMS for hypotension 73/37 mmHg and bradycardia (40's). Patient admitted to ICU for shock and acute encephalopathy.     DX:  - Shock septic vs hypovolemia  - LE cellulitis   - Acute encephalopathy   - Sinus bradycardia  - HAGMA   - SHEYLA on CKD  - Anemia   - COPD not on oxygen at home   - HLD   - BPH  - Hypothyroidism   - Mood disorder       =================== Neuro============================  #Acute Encephalopathy; likely in the setting of acidemia, infection, uremia    Lethargic, fighting vent    Pain meds with Tylenol   CT Head negative for acute bleed/swelling  Off sedation  Alert and Oriented X2    #Seizure  - Home med includes valproic acid  - Patient is having repeated muscle spasms, improved on Versed but not fully stopped  - Still having muscle movements after dialysis which brought down BUN  - Started propofol instead of precedex  - Valproic acid level low, likely due to missed doses when patient was originally admitted.  - cont keppra and valproate as per neuro recs, prefer valproate as less nephrotoxic  - spot EEG 12/3 - moderate generalized slowing  - f/u 24 hr EEG (12/4)  - Valproic Acid to 750mg Q6  - Valproic Acid Trough in AM 54    ================= Cardiovascular==========================  Sinus bradycardia  - EKG sinus bradycardia, old RBBB  - TTE 12/1 - LVEF 60-65%  - trops neg  - Given normal EF, no changes in EKG, cardiogenic shock less likely    Hypotension  - off pressers      ================- Pulm=================================  COPD  - Patient with hx of COPD not in exacerbation   - Chest X ray Showed bilateral patchy infiltrates, no concern for fluid overload    - Oxygen supplementation as needed to maintain SpO2 of 88-92%  - C/w home meds   - On BiPAP, saturated 90s; transition to high flow to assess mentation, dysphagia screen      ==================ID===================================  Shock  - Concern for sepsis vs hypovolemic   - Empirical treatment with Cefepime for LE cellulitis and for pseudomonal coverage  - S/p 4L NS   - Started on pressors   F/u lactate 1.4, procalcitonin, Bcx, Ucx, CK NGTD  - ID consulted Dr Nolasco     ================= Nephro================================  SHEYLA on CKD  - On admission, patient with Cr 6.4>>5.6, baseline 3.7; stable at baseline today  - Worsening CKD most likely a combination of obstructive vs pre- renal   - Viera catheter placed in ED with 1000 cc of UO  - -58mL in the past 24 hours  - Avoid nephrotoxins, NSAIDS, ACEI and ARBS  - Monitor BMP daily  - Nephro Dr Valenzuela consulted  - S/p Dialysis 12/2 and 12/3, Creatnine near baseline, mental status improved  Dialysis held today 12/6    HAGMA  - Most likely from uremia   - Nephro Dr Valenzuela consulted  - Hold dialysis today as BUN/Cr near baseline    Hypernatremia  [ ] Free Water 300ml Q8 via NGT    =================GI====================================  NGT in place  Start tube feeds  Resume psych PO medications- Wellbutrin  Swallow evaluation  Bowel Regimen Give tonight,  Possible disimpaction tomorrow    ================ Heme==================================  Anemia   - Anemia of chronic disease   - Monitor CBC daily   - Hgb Stable no signs of active bleed    =================Endocrine===============================  Hypothyroidism  - F/u TSH Normal   - C/w home meds when not NPO     ================= Skin/Catheters============================  Peripheral IV lines   Viera catheter    Sacral and Posterior Thigh Ulcers, Wound Care Consulted  [ ] f/u removal of shiley     =================Prophylaxis =============================  DVT prophylaxis with Heparin SQ daily   GI prophylaxis with PPI daily    ==================GOC==================================  FULL CODE

## 2022-12-07 NOTE — PROGRESS NOTE ADULT - SUBJECTIVE AND OBJECTIVE BOX
Podiatry HPI: Patient is seen in ICU bed, no longer intubated, now verbal. Patient has CAIR boots on. Patient asks for help through his nebulizer treatment. Unable to understand, will interview patient on following exam     HPI:  62 year old morbid obese male, from [facility], has past medical hx of ambulatory dysfunction, ACD, bipolar disorder, BPH, CKD, HLD, PVD,  COPD not on oxygen, hypothyroidism, RBBB, lymphedema, CHF (last echo 2/22 normal EF, GIDD) was BIBEMS for hypotension 73/37 mmHg and bradycardia (40's). Story obtained from ED provider note as patient is not able to provide any history. Patient received 500 cc NS and one dose of atropine for bradycardia. Patient very somnolent during interview but arousable to verbal stimuli.  (30 Nov 2022 18:39)    Patient admits to  (-) Fevers, (-) Chills, (-) Nausea, (-) Vomiting, (-) Shortness of Breath (-) calf pain (-) chest pain     Medications albuterol    0.083% 2.5 milliGRAM(s) Nebulizer every 6 hours  albuterol    90 MICROgram(s) HFA Inhaler 1 Puff(s) Inhalation every 4 hours  albuterol/ipratropium for Nebulization 3 milliLiter(s) Nebulizer every 6 hours  bisacodyl Suppository 10 milliGRAM(s) Rectal daily PRN  cefTRIAXone   IVPB 2000 milliGRAM(s) IV Intermittent every 24 hours  chlorhexidine 2% Cloths 1 Application(s) Topical <User Schedule>  collagenase Ointment 1 Application(s) Topical two times a day  dextrose 5%. 1000 milliLiter(s) IV Continuous <Continuous>  doxazosin 1 milliGRAM(s) Oral at bedtime  finasteride 5 milliGRAM(s) Oral daily  heparin   Injectable 5000 Unit(s) SubCutaneous every 8 hours  levothyroxine 125 MICROGram(s) Oral daily  pantoprazole  Injectable 40 milliGRAM(s) IV Push daily  polyethylene glycol 3350 17 Gram(s) Oral at bedtime  senna Syrup 10 milliLiter(s) Oral at bedtime  sevelamer carbonate Powder 800 milliGRAM(s) Enteral Tube three times a day  valproic  acid Syrup 750 milliGRAM(s) Oral every 6 hours  zinc oxide 20% Ointment 1 Application(s) Topical two times a day    FHNo pertinent family history in first degree relatives    ,   PMHNo pertinent past medical history    Hypothyroid    Hyperlipidemia    Schizophrenia    Schizoaffective disorder    Ambulatory dysfunction    Anemia    History of BPH    CKD (chronic kidney disease)    Chronic obstructive pulmonary disease (COPD)    Bipolar disorder    Bipolar disorder    Mood disorder    HLD (hyperlipidemia)    BPH (benign prostatic hyperplasia)    Anemia    Chronic diastolic congestive heart failure    Lymphedema       PSHNo significant past surgical history    No significant past surgical history        Labs                          7.4    11.69 )-----------( 151      ( 07 Dec 2022 03:19 )             25.2      12-07    151<H>  |  116<H>  |  73<H>  ----------------------------<  99  4.2   |  31  |  3.69<H>    Ca    9.7      07 Dec 2022 03:19  Phos  4.9     12-07  Mg     2.4     12-07    TPro  5.8<L>  /  Alb  2.0<L>  /  TBili  0.3  /  DBili  x   /  AST  11  /  ALT  12  /  AlkPhos  63  12-07     Vital Signs Last 24 Hrs  T(C): 37.2 (07 Dec 2022 09:00), Max: 37.2 (07 Dec 2022 09:00)  T(F): 99 (07 Dec 2022 09:00), Max: 99 (07 Dec 2022 09:00)  HR: 98 (07 Dec 2022 11:00) (78 - 113)  BP: 133/47 (07 Dec 2022 11:00) (98/43 - 150/81)  BP(mean): 69 (07 Dec 2022 11:00) (59 - 103)  RR: 19 (07 Dec 2022 11:00) (15 - 38)  SpO2: 98% (07 Dec 2022 11:00) (83% - 100%)    Parameters below as of 07 Dec 2022 11:00  Patient On (Oxygen Delivery Method): BiPAP/CPAP    O2 Concentration (%): 70          WBC Count: 11.69 K/uL *H* (12-07-22 @ 03:19)      PHYSICAL EXAM  Vasc:  DP and PT pulses non palpable due to b/l edema. TG: warm to warm. Pedal hair absent. Bilateral non pitting edema to LE.   Derm: improved appearance of bilateral extremities on exam. B/L venous stasis changes to skin with hemosiderin deposits. Peau d'orange appearance of skin due to venous stasis, no weeping on exam today. Edema is visibly improved. No purulence or drainage noted at b/l legs on exam. No infected wounds on inspection of b/l lower extremity; no rash noted at b/l legs. Mild IDM noted in all interspaces. Hypertrophic and elongated toenails x 10  Neuro: unable to assess  MSK: unable to assess        A:  Chronic bilateral venous stasis with hemosiderin deposits in skin    P:   Patient evaluated and Chart reviewed  Unable to discuss diagnosis with patient  Inspected b/l extremities for signs of infection, no weeping or open wounds  Continue with IV antibiotics As Per ID  Continue wearing CAIR boots to avoid pressure ulcers as tolerated by patient   Podiatry to follow while in house.  Outpatient nail care as needed  Seen and discussed with Dr. Foster  Discussed with Attending Dr. Navas

## 2022-12-07 NOTE — PROGRESS NOTE ADULT - ASSESSMENT
1. SHEYLA due to ATN.  First HD 12/2. s/p riosley on 12/2. dialysis started since Scr not improved and remained at 5s.  -Scr remains stable at 3.7mg/dL with stable electrolytes and good urine output. will hold off HD today and monitor renal recovery.   -Adjust meds to eGFR and avoid IV Gadolinium contrast,NSAIDs, and phosphate enema.  -Monitor I/O's daily.   -Monitor SMA daily.  2. CKD stage 4 most likely due to ischemic nephropathy  -baseline scr around 3.7mg/dL in july 2022.  -Keep patient euvolemic and renal diet  -Avoid Nephrotoxic Meds/ Agents such as (NSAIDs, IV contrast, Aminoglycosides such as gentamicin, -Gadolinium contrast, Phosphate containing enemas, etc..)  -Adjust Medications according to eGFR  3. Hypotension possible sepsis  -bp is stable and off pressor.  4. Mineral Bone Disease:  -phos is improving and PTH intact is 400.  -continue calcitriol 0.25mcg daily.  5. HAGMA and respiratory acidosis:  -abg noted.   -patient has respiratory acidosis and managed by ICU.   6. Bradycardia due to unclear etiology  -improving heart rate.  -trend troponins  -TSH is nl  7. Pulm:  -s/p intubated on 11/30 for worsening respiratory support.  -plan as per MICU.   8. Anemia of ESRD:  -iron panel and ferritin is acceptable.   -continue Epogen 8000units IV/SC TIW  -F/u CBC daily  -transfuse if HB < 7.0.  9. Encephalopathy:  -video EEG shows no seizures.    10. Hypernatremia due to water deficit and insensible losses. Pt is clinically euvolemic.   -Na worsenig; free water 300 q8hrs or D5W at 70cc/hr.  -Monitor I/O's. Check Serum Na Daily. Avoid high solute intake diet and sodium bicarbonate infuse. Avoid overcorrection of NA (8-10meq/day)      Patient is critically ill. Time Spent: 35mins.  Discussed the assessment and plan with ICU Team/Nurse

## 2022-12-07 NOTE — PROGRESS NOTE ADULT - SUBJECTIVE AND OBJECTIVE BOX
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 jerks, and less likely to represent epileptic seizures given absence of seizures on continuous EEG      RECOMMENDATIONS:    - Valproic acid syrup 750mg PO QID to be switched to IV Valproate since patient is unable to receive NG tube feeds while BiPAP is used during most of the day    - Levetiracetam 500mg PT/IV BID does not need to be resumed since myoclonic jerks are well-controlled on Valproic acid    - Respiratory management and infectious workup as per primary team    - DVT ppx: SCDs, Heparin          NOTE TO BE COMPLETED - PLEASE REFER TO ABOVE ONLY AND IGNORE INFORMATION BELOW    ******************************    HPI:  62 year old morbid obese male, from [facility], has past medical hx of ambulatory dysfunction, ACD, bipolar disorder, BPH, CKD, HLD, PVD,  COPD not on oxygen, hypothyroidism, RBBB, lymphedema, CHF (last echo 2/22 normal EF, GIDD) was BIBEMS for hypotension 73/37 mmHg and bradycardia (40's). Story obtained from ED provider note as patient is not able to provide any history. Patient received 500 cc NS and one dose of atropine for bradycardia. Patient very somnolent during interview but arousable to verbal stimuli.  (30 Nov 2022 18:39)      NEURO HPI:      INTERVAL HISTORY:      MEDICATIONS:  albuterol/ipratropium for Nebulization 3 milliLiter(s) Nebulizer every 6 hours  cefTRIAXone   IVPB 2000 milliGRAM(s) IV Intermittent every 24 hours  chlorhexidine 2% Cloths 1 Application(s) Topical <User Schedule>  collagenase Ointment 1 Application(s) Topical two times a day  dextrose 5%. 1000 milliLiter(s) IV Continuous <Continuous>  doxazosin 1 milliGRAM(s) Oral at bedtime  heparin   Injectable 5000 Unit(s) SubCutaneous every 8 hours  levothyroxine 125 MICROGram(s) Oral daily  pantoprazole  Injectable 40 milliGRAM(s) IV Push daily  sevelamer carbonate Powder 800 milliGRAM(s) Enteral Tube three times a day  valproate sodium  IVPB 750 milliGRAM(s) IV Intermittent every 6 hours  zinc oxide 20% Ointment 1 Application(s) Topical two times a day      ALLERGIES:  No Known Allergies      REVIEW OF SYSTEMS:  Fourteen systems reviewed and negative except as in HPI / Interval History.        OBJECTIVE:  Vital Signs Last 24 Hrs  T(C): 37.3 (07 Dec 2022 18:00), Max: 37.3 (07 Dec 2022 13:00)  T(F): 99.1 (07 Dec 2022 18:00), Max: 99.1 (07 Dec 2022 13:00)  HR: 85 (07 Dec 2022 20:16) (80 - 113)  BP: 141/59 (07 Dec 2022 20:00) (98/43 - 157/68)  BP(mean): 82 (07 Dec 2022 20:00) (60 - 103)  RR: 23 (07 Dec 2022 20:00) (19 - 38)  SpO2: 99% (07 Dec 2022 20:16) (83% - 100%)    Parameters below as of 07 Dec 2022 19:00  Patient On (Oxygen Delivery Method): BiPAP/CPAP        General Examination:  General: No acute distress  HEENT: Atraumatic, Normocephalic  Respiratory: CTA B/l.  No crackles, rhonchi, or wheezes.  Cardiovascular: RRR.  Normal S1 & S2.  Normal b/l radial and pedal pulses.    Neurological Examination:  General / Mental Status: AAO x 3.  No aphasia or dysarthria.  Naming and repetition intact.  Cranial Nerves: VFF x 4.  PERRL.  EOMI x 2, No nystagmus or diplopia.  B/l V1-V3 equal and intact to light touch and pinprick.  Symmetric facial movement and palate elevation.  B/l hearing equal to finger rub.  5/5 strength with b/l sternocleidomastoid & trapezius.  Midline tongue protrusion, with no atrophy or fasciculations.  Motor: Normal bulk & tone in all four extremities.  5/5 strength throughout all four extremities.  No downward drift, rigidity, spasticity, or tremors in any of the four extremities.  Sensory: Intact to light touch and pinprick in all four extremities.  Negative Romberg.  Reflex: 2+ and symmetric at b/l biceps, triceps, brachioradialis, patellae, and ankles.  Downgoing toes b/l.  Coordination: No dysmetria with b/l finger-to-nose and heel raise tests.  Symmetric rapid alternating movements b/l.  Gait: Normal, narrow-based gait.  No difficulty with tiptoe, heel, and tandem gaits.        LABORATORY VALUES:                          7.4    11.69 )-----------( 151      ( 07 Dec 2022 03:19 )             25.2       12-07    151<H>  |  116<H>  |  73<H>  ----------------------------<  99  4.2   |  31  |  3.69<H>    Ca    9.7      07 Dec 2022 03:19  Phos  4.9     12-07  Mg     2.4     12-07    TPro  5.8<L>  /  Alb  2.0<L>  /  TBili  0.3  /  DBili  x   /  AST  11  /  ALT  12  /  AlkPhos  63  12-07      LIVER FUNCTIONS - ( 07 Dec 2022 03:19 )  Alb: 2.0 g/dL / Pro: 5.8 g/dL / ALK PHOS: 63 U/L / ALT: 12 U/L DA / AST: 11 U/L / GGT: x                 Glucose Trend  12-07-22 @ 17:12   -  -- -- 104<H>  12-07-22 @ 11:10   -  -- -- 100<H>  12-07-22 @ 05:34   -  -- -- 112<H>  12-07-22 @ 03:19   -  -- 99 --  12-06-22 @ 23:15   -  -- -- 98  12-06-22 @ 16:33   -  -- -- 90  12-06-22 @ 14:13   -  -- -- 94  12-06-22 @ 04:16   -  -- 102<H> --  12-05-22 @ 05:09   -  -- 94 --  12-05-22 @ 00:10   -  -- -- 88                    NEUROIMAGING:          Please contact the Neurology consult service with any neurological questions.      Eric Rose MD   of Neurology  [facility] at [facility]         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 jerks, and less likely to represent epileptic seizures given absence of seizures on continuous EEG      RECOMMENDATIONS:    - Valproic acid syrup 750mg PO QID to be switched to IV Valproate since patient is unable to receive NG tube feeds while BiPAP is used during most of the day    - Levetiracetam 500mg PT/IV BID does not need to be resumed since myoclonic jerks are well-controlled on Valproic acid    - Respiratory management and infectious workup as per primary team    - DVT ppx: SCDs, Heparin          ******************************    HPI:  62 year old morbid obese male, from [facility], has past medical hx of ambulatory dysfunction, ACD, bipolar disorder, BPH, CKD, HLD, PVD,  COPD not on oxygen, hypothyroidism, RBBB, lymphedema, CHF (last echo 2/22 normal EF, GIDD) was BIBEMS for hypotension 73/37 mmHg and bradycardia (40's). Story obtained from ED provider note as patient is not able to provide any history. Patient received 500 cc NS and one dose of atropine for bradycardia. Patient very somnolent during interview but arousable to verbal stimuli.  (30 Nov 2022 18:39)      NEURO HPI:  62 LHM presenting after experiencing bradycardia and hypotension, and then observed to have shaking of both arms and hands while being managed in the ICU.      INTERVAL HISTORY:  The patient continues to be managed on BiPAP. He has not had any witnessed arm shaking overnight.      MEDICATIONS:  albuterol/ipratropium for Nebulization 3 milliLiter(s) Nebulizer every 6 hours  cefTRIAXone   IVPB 2000 milliGRAM(s) IV Intermittent every 24 hours  chlorhexidine 2% Cloths 1 Application(s) Topical <User Schedule>  collagenase Ointment 1 Application(s) Topical two times a day  dextrose 5%. 1000 milliLiter(s) IV Continuous <Continuous>  doxazosin 1 milliGRAM(s) Oral at bedtime  heparin   Injectable 5000 Unit(s) SubCutaneous every 8 hours  levothyroxine 125 MICROGram(s) Oral daily  pantoprazole  Injectable 40 milliGRAM(s) IV Push daily  sevelamer carbonate Powder 800 milliGRAM(s) Enteral Tube three times a day  valproate sodium  IVPB 750 milliGRAM(s) IV Intermittent every 6 hours  zinc oxide 20% Ointment 1 Application(s) Topical two times a day      ALLERGIES:  No Known Allergies      REVIEW OF SYSTEMS:  Fourteen systems reviewed and negative except as in HPI / Interval History.        OBJECTIVE:  Vital Signs Last 24 Hrs  T(C): 37.3 (07 Dec 2022 18:00), Max: 37.3 (07 Dec 2022 13:00)  T(F): 99.1 (07 Dec 2022 18:00), Max: 99.1 (07 Dec 2022 13:00)  HR: 85 (07 Dec 2022 20:16) (80 - 113)  BP: 141/59 (07 Dec 2022 20:00) (98/43 - 157/68)  BP(mean): 82 (07 Dec 2022 20:00) (60 - 103)  RR: 23 (07 Dec 2022 20:00) (19 - 38)  SpO2: 99% (07 Dec 2022 20:16) (83% - 100%)  Parameters below as of 07 Dec 2022 19:00  Patient On (Oxygen Delivery Method): BiPAP/CPAP        General Examination:  General: No acute distress  HEENT: Atraumatic, Normocephalic  Respiratory: Rapid rate, No crackles  Cardiovascular: RRR.  Normal S1 & S2.  Normal b/l radial and pedal pulses.    Neurological Examination:  General / Mental Status: AAO x 1 (only to name).  No aphasia or dysarthria.  Cranial Nerves: B/l blink to threat present.  PERRL.  EOMI x 2, No nystagmus.  B/l V1-V3 equal and intact to light touch and pinprick.  Symmetric facial movement and palate elevation.  B/l hearing equal to finger rub.  At least 3/5 strength with b/l sternocleidomastoid & trapezius.  Midline tongue protrusion.  Motor: Normal bulk & tone in all four extremities.  At least 3/5 strength throughout all four extremities, limited by poor effort.  No downward drift, rigidity, spasticity, or tremors in any of the four extremities.  Sensory: Intact to light touch and pinprick in all four extremities.  Reflex: 1+ and symmetric at b/l biceps, triceps, brachioradialis, patellae, and ankles.  Downgoing toes b/l.  Coordination: No dysmetria with b/l finger-to-nose and heel raise tests.  Gait and Romberg sing testing deferred while patient is on BiPAP.          LABORATORY VALUES:                          7.4    11.69 )-----------( 151      ( 07 Dec 2022 03:19 )             25.2       12-07    151<H>  |  116<H>  |  73<H>  ----------------------------<  99  4.2   |  31  |  3.69<H>    Ca    9.7      07 Dec 2022 03:19  Phos  4.9     12-07  Mg     2.4     12-07    TPro  5.8<L>  /  Alb  2.0<L>  /  TBili  0.3  /  DBili  x   /  AST  11  /  ALT  12  /  AlkPhos  63  12-07    Glucose Trend  12-07-22 @ 17:12   -  -- -- 104<H>  12-07-22 @ 11:10   -  -- -- 100<H>  12-07-22 @ 05:34   -  -- -- 112<H>  12-07-22 @ 03:19   -  -- 99 --  12-06-22 @ 23:15   -  -- -- 98  12-06-22 @ 16:33   -  -- -- 90  12-06-22 @ 14:13   -  -- -- 94  12-06-22 @ 04:16   -  -- 102<H> --  12-05-22 @ 05:09   -  -- 94 --  12-05-22 @ 00:10   -  -- -- 88          NEUROIMAGING:      CT Head (12/1/22):  - No acute intracranial abnormality  - Chronic microvascular changes  - Mild diffuse atrophy  - Incidental inflammatory changes in sinuses      Routine EEG (12/3/22):  - Moderate generalized background slowing  - No seizure tendency identified      Repeat CT Head (12/4/22):  - No acute intracranial abnormality  - Mild chronic microvascular changes  - Mild diffuse atrophy      Routine & Continuous EEG (12/4/22 - 12/5/22):  - Mild/Moderate generalized background slowing  - No seizure tendency identified            Please contact the Neurology consult service with any neurological questions.      Eric Rose MD   of Neurology  [facility] at [facility]         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 jerks, and less likely to represent epileptic seizures given absence of seizures on continuous EEG      RECOMMENDATIONS:    - Valproic acid syrup 750mg PO QID to be switched to IV Valproate since patient is unable to receive NG tube feeds while BiPAP is used during most of the day    - Levetiracetam 500mg PT/IV BID does not need to be resumed since myoclonic jerks are well-controlled on Valproic acid    - Respiratory management and infectious workup as per primary team    - DVT ppx: SCDs, Heparin          ******************************    HPI:  62 year old morbid obese male, from [facility], has past medical hx of ambulatory dysfunction, ACD, bipolar disorder, BPH, CKD, HLD, PVD,  COPD not on oxygen, hypothyroidism, RBBB, lymphedema, CHF (last echo 2/22 normal EF, GIDD) was BIBEMS for hypotension 73/37 mmHg and bradycardia (40's). Story obtained from ED provider note as patient is not able to provide any history. Patient received 500 cc NS and one dose of atropine for bradycardia. Patient very somnolent during interview but arousable to verbal stimuli.  (30 Nov 2022 18:39)      NEURO HPI:  62 LHM presenting after experiencing bradycardia and hypotension, and then observed to have shaking of both arms and hands while being managed in the ICU.      INTERVAL HISTORY:  The patient continues to be managed on BiPAP. He has not had any witnessed arm shaking overnight.      MEDICATIONS:  albuterol/ipratropium for Nebulization 3 milliLiter(s) Nebulizer every 6 hours  cefTRIAXone   IVPB 2000 milliGRAM(s) IV Intermittent every 24 hours  chlorhexidine 2% Cloths 1 Application(s) Topical <User Schedule>  collagenase Ointment 1 Application(s) Topical two times a day  dextrose 5%. 1000 milliLiter(s) IV Continuous <Continuous>  doxazosin 1 milliGRAM(s) Oral at bedtime  heparin   Injectable 5000 Unit(s) SubCutaneous every 8 hours  levothyroxine 125 MICROGram(s) Oral daily  pantoprazole  Injectable 40 milliGRAM(s) IV Push daily  sevelamer carbonate Powder 800 milliGRAM(s) Enteral Tube three times a day  valproate sodium  IVPB 750 milliGRAM(s) IV Intermittent every 6 hours  zinc oxide 20% Ointment 1 Application(s) Topical two times a day      ALLERGIES:  No Known Allergies      REVIEW OF SYSTEMS:  Fourteen systems reviewed and negative except as in HPI / Interval History.        OBJECTIVE:  Vital Signs Last 24 Hrs  T(C): 37.3 (07 Dec 2022 18:00), Max: 37.3 (07 Dec 2022 13:00)  T(F): 99.1 (07 Dec 2022 18:00), Max: 99.1 (07 Dec 2022 13:00)  HR: 85 (07 Dec 2022 20:16) (80 - 113)  BP: 141/59 (07 Dec 2022 20:00) (98/43 - 157/68)  BP(mean): 82 (07 Dec 2022 20:00) (60 - 103)  RR: 23 (07 Dec 2022 20:00) (19 - 38)  SpO2: 99% (07 Dec 2022 20:16) (83% - 100%)  Parameters below as of 07 Dec 2022 19:00  Patient On (Oxygen Delivery Method): BiPAP/CPAP        General Examination:  General: No acute distress  HEENT: Atraumatic, Normocephalic  Respiratory: Rapid rate, No crackles  Cardiovascular: RRR.  Normal S1 & S2.  Normal b/l radial and pedal pulses.    Neurological Examination:  General / Mental Status: AAO x 1 (only to name).  No aphasia or dysarthria.  Cranial Nerves: B/l blink to threat present.  PERRL.  EOMI x 2, No nystagmus.  B/l V1-V3 equal and intact to light touch and pinprick.  Symmetric facial movement and palate elevation.  B/l hearing equal to finger rub.  At least 3/5 strength with b/l sternocleidomastoid & trapezius.  Midline tongue protrusion.  Motor: Normal bulk & tone in all four extremities.  At least 3/5 strength throughout all four extremities, limited by poor effort, with downward drift to bed only in both lower extremities.  No rigidity, spasticity, or tremors in any of the four extremities.  Sensory: Intact to light touch and pinprick in all four extremities.  Reflex: 1+ and symmetric at b/l biceps, triceps, brachioradialis, patellae, and ankles.  Downgoing toes b/l.  Coordination: No dysmetria with b/l finger-to-nose and heel raise tests.  Gait and Romberg sign testing deferred while patient is on BiPAP.          LABORATORY VALUES:                          7.4    11.69 )-----------( 151      ( 07 Dec 2022 03:19 )             25.2       12-07    151<H>  |  116<H>  |  73<H>  ----------------------------<  99  4.2   |  31  |  3.69<H>    Ca    9.7      07 Dec 2022 03:19  Phos  4.9     12-07  Mg     2.4     12-07    TPro  5.8<L>  /  Alb  2.0<L>  /  TBili  0.3  /  DBili  x   /  AST  11  /  ALT  12  /  AlkPhos  63  12-07    Glucose Trend  12-07-22 @ 17:12   -  -- -- 104<H>  12-07-22 @ 11:10   -  -- -- 100<H>  12-07-22 @ 05:34   -  -- -- 112<H>  12-07-22 @ 03:19   -  -- 99 --  12-06-22 @ 23:15   -  -- -- 98  12-06-22 @ 16:33   -  -- -- 90  12-06-22 @ 14:13   -  -- -- 94  12-06-22 @ 04:16   -  -- 102<H> --  12-05-22 @ 05:09   -  -- 94 --  12-05-22 @ 00:10   -  -- -- 88          NEUROIMAGING:      CT Head (12/1/22):  - No acute intracranial abnormality  - Chronic microvascular changes  - Mild diffuse atrophy  - Incidental inflammatory changes in sinuses      Routine EEG (12/3/22):  - Moderate generalized background slowing  - No seizure tendency identified      Repeat CT Head (12/4/22):  - No acute intracranial abnormality  - Mild chronic microvascular changes  - Mild diffuse atrophy      Routine & Continuous EEG (12/4/22 - 12/5/22):  - Mild/Moderate generalized background slowing  - No seizure tendency identified            Please contact the Neurology consult service with any neurological questions.      Eric Rose MD   of Neurology  [facility]

## 2022-12-07 NOTE — PROGRESS NOTE ADULT - SUBJECTIVE AND OBJECTIVE BOX
INTERVAL HPI/OVERNIGHT EVENTS:     PRESSORS: [ ] YES [ ] NO  WHICH:    ANTIBIOTICS:                  DATE STARTED:  ANTIBIOTICS:                  DATE STARTED:  ANTIBIOTICS:                  DATE STARTED:    Antimicrobial:  cefTRIAXone   IVPB 2000 milliGRAM(s) IV Intermittent every 24 hours    Cardiovascular:    Pulmonary:  albuterol    0.083% 2.5 milliGRAM(s) Nebulizer every 6 hours  albuterol    90 MICROgram(s) HFA Inhaler 1 Puff(s) Inhalation every 4 hours    Hematalogic:  heparin   Injectable 5000 Unit(s) SubCutaneous every 8 hours    Other:  bisacodyl Suppository 10 milliGRAM(s) Rectal daily PRN  chlorhexidine 2% Cloths 1 Application(s) Topical <User Schedule>  collagenase Ointment 1 Application(s) Topical two times a day  epoetin madyson-epbx (RETACRIT) Injectable 8000 Unit(s) IV Push <User Schedule>  finasteride 5 milliGRAM(s) Oral daily  levothyroxine 125 MICROGram(s) Oral daily  pantoprazole   Suspension 40 milliGRAM(s) Oral daily  polyethylene glycol 3350 17 Gram(s) Oral at bedtime  risperiDONE   Tablet 2 milliGRAM(s) Oral daily  senna Syrup 10 milliLiter(s) Oral at bedtime  sevelamer carbonate Powder 800 milliGRAM(s) Enteral Tube three times a day  tamsulosin 0.4 milliGRAM(s) Oral at bedtime  valproic  acid Syrup 750 milliGRAM(s) Oral every 6 hours  zinc oxide 20% Ointment 1 Application(s) Topical two times a day      Drug Dosing Weight  Height (cm): 177.8 (30 Nov 2022 18:45)  Weight (kg): 113.9 (30 Nov 2022 18:45)  BMI (kg/m2): 36 (30 Nov 2022 18:45)  BSA (m2): 2.3 (30 Nov 2022 18:45)    CENTRAL LINE: [ ] YES [ ] NO  LOCATION:   DATE INSERTED:  REMOVE: [ ] YES [ ] NO  EXPLAIN:    HATFIELD: [ ] YES [ ] NO    DATE INSERTED:  REMOVE:  [ ] YES [ ] NO  EXPLAIN:    A-LINE:  [ ] YES [ ] NO  LOCATION:   DATE INSERTED:  REMOVE:  [ ] YES [ ] NO  EXPLAIN:    PMH/Social Hx/Fam Hx -reviewed admission note, no change since admission  PAST MEDICAL & SURGICAL HISTORY:  Hypothyroid      Hyperlipidemia      Ambulatory dysfunction      Anemia      History of BPH      CKD (chronic kidney disease)      Chronic obstructive pulmonary disease (COPD)      Bipolar disorder      HLD (hyperlipidemia)      BPH (benign prostatic hyperplasia)      Chronic diastolic congestive heart failure      Lymphedema      No significant past surgical history        Heart faliure: acute [ ] chronic [ ] acute or chronic [ ] diastolic [ ] systolic [ ] combied systolic and diastolic[ ]  SHEYLA: ATN[ ] renal medullary necrosis [ ] CKD I [ ]CKDII [ ]CKD III [ ]CKD IV [ ]CKD V [ ]Other pathological lesions [ ]  Abdominal Nutrition Status: malnutrition [ ] cachexia [ ] morbid obesity/BMI=40 [ ] Supplement ordered [___________]     T(C): 37.1 (12-07-22 @ 04:30), Max: 37.3 (12-06-22 @ 08:00)  HR: 99 (12-07-22 @ 07:00)  BP: 149/67 (12-07-22 @ 07:00)  BP(mean): 92 (12-07-22 @ 07:00)  ABP: --  ABP(mean): --  RR: 24 (12-07-22 @ 07:00)  SpO2: 93% (12-07-22 @ 07:00)  Wt(kg): --          12-06 @ 07:01  -  12-07 @ 07:00  --------------------------------------------------------  IN: 650 mL / OUT: 2470 mL / NET: -1820 mL            PHYSICAL EXAM:    GENERAL: [ ]NAD, [ ]well-groomed, [ ]well-developed  HEAD:  [ ]Atraumatic, [ ]Normocephalic  EYES: [ ]EOMI, [ ]PERRLA, [ ]conjunctiva and sclera clear  ENMT: [ ]No tonsillar erythema, exudates, or enlargement; [ ]Moist mucous membranes, [ ]Good dentition, [ ]No lesions  NECK: [ ]Supple, normal appearance, [ ]No JVD; [ ]Normal thyroid; [ ]Trachea midline  NERVOUS SYSTEM:  [ ]Alert & Oriented X3, [ ]Good concentration; [ ]Motor Strength 5/5 B/L upper and lower extremities; [ ]DTRs 2+ intact and symmetric  CHEST/LUNG: [ ]No chest deformity; [ ]Normal percussion bilaterally; [ ]No rales, rhonchi, wheezing; [ ]Crackles at bases  HEART: [ ]Regular rate and rhythm; [ ]No murmurs, rubs, or gallops  ABDOMEN: [ ]Soft, Nontender, Nondistended; [ ]Bowel sounds present  EXTREMITIES:  [ ]2+ Peripheral Pulses, [ ]No clubbing, cyanosis, or edema [ ]Bilat lower extremity edema  LYMPH: [ ]No lymphadenopathy noted  SKIN: [ ]No rashes or lesions; [ ]Good capillary refill      LABS:  CBC Full  -  ( 07 Dec 2022 03:19 )  WBC Count : 11.69 K/uL  RBC Count : 2.47 M/uL  Hemoglobin : 7.4 g/dL  Hematocrit : 25.2 %  Platelet Count - Automated : 151 K/uL  Mean Cell Volume : 102.0 fl  Mean Cell Hemoglobin : 30.0 pg  Mean Cell Hemoglobin Concentration : 29.4 gm/dL  Auto Neutrophil # : x  Auto Lymphocyte # : x  Auto Monocyte # : x  Auto Eosinophil # : x  Auto Basophil # : x  Auto Neutrophil % : x  Auto Lymphocyte % : x  Auto Monocyte % : x  Auto Eosinophil % : x  Auto Basophil % : x    12-07    151<H>  |  116<H>  |  73<H>  ----------------------------<  99  4.2   |  31  |  3.69<H>    Ca    9.7      07 Dec 2022 03:19  Phos  4.9     12-07  Mg     2.4     12-07    TPro  5.8<L>  /  Alb  2.0<L>  /  TBili  0.3  /  DBili  x   /  AST  11  /  ALT  12  /  AlkPhos  63  12-07            RADIOLOGY & ADDITIONAL STUDIES REVIEWED:      [ ]GOALS OF CARE DISCUSSION WITH PATIENT/FAMILY/PROXY:    CRITICAL CARE TIME SPENT: 35 minutes INTERVAL HPI/OVERNIGHT EVENTS: BIPAP overnight, FiO2 down to 80%      Antimicrobial:  cefTRIAXone   IVPB 2000 milliGRAM(s) IV Intermittent every 24 hours    Cardiovascular:    Pulmonary:  albuterol    0.083% 2.5 milliGRAM(s) Nebulizer every 6 hours  albuterol    90 MICROgram(s) HFA Inhaler 1 Puff(s) Inhalation every 4 hours    Hematalogic:  heparin   Injectable 5000 Unit(s) SubCutaneous every 8 hours    Other:  bisacodyl Suppository 10 milliGRAM(s) Rectal daily PRN  chlorhexidine 2% Cloths 1 Application(s) Topical <User Schedule>  collagenase Ointment 1 Application(s) Topical two times a day  epoetin madyson-epbx (RETACRIT) Injectable 8000 Unit(s) IV Push <User Schedule>  finasteride 5 milliGRAM(s) Oral daily  levothyroxine 125 MICROGram(s) Oral daily  pantoprazole   Suspension 40 milliGRAM(s) Oral daily  polyethylene glycol 3350 17 Gram(s) Oral at bedtime  risperiDONE   Tablet 2 milliGRAM(s) Oral daily  senna Syrup 10 milliLiter(s) Oral at bedtime  sevelamer carbonate Powder 800 milliGRAM(s) Enteral Tube three times a day  tamsulosin 0.4 milliGRAM(s) Oral at bedtime  valproic  acid Syrup 750 milliGRAM(s) Oral every 6 hours  zinc oxide 20% Ointment 1 Application(s) Topical two times a day      Drug Dosing Weight  Height (cm): 177.8 (30 Nov 2022 18:45)  Weight (kg): 113.9 (30 Nov 2022 18:45)  BMI (kg/m2): 36 (30 Nov 2022 18:45)  BSA (m2): 2.3 (30 Nov 2022 18:45)    PMH/Social Hx/Fam Hx -reviewed admission note, no change since admission  PAST MEDICAL & SURGICAL HISTORY:  Hypothyroid      Hyperlipidemia      Ambulatory dysfunction      Anemia      History of BPH      CKD (chronic kidney disease)      Chronic obstructive pulmonary disease (COPD)      Bipolar disorder      HLD (hyperlipidemia)      BPH (benign prostatic hyperplasia)      Chronic diastolic congestive heart failure      Lymphedema      No significant past surgical history      T(C): 37.1 (12-07-22 @ 04:30), Max: 37.3 (12-06-22 @ 08:00)  HR: 99 (12-07-22 @ 07:00)  BP: 149/67 (12-07-22 @ 07:00)  BP(mean): 92 (12-07-22 @ 07:00)  ABP: --  ABP(mean): --  RR: 24 (12-07-22 @ 07:00)  SpO2: 93% (12-07-22 @ 07:00)  Wt(kg): --          12-06 @ 07:01  -  12-07 @ 07:00  --------------------------------------------------------  IN: 650 mL / OUT: 2470 mL / NET: -1820 mL            PHYSICAL EXAM:    GENERAL: NAD, well-groomed, well-developed  HEAD:  Atraumatic, Normocephalic  EYES: EOMI, PERRLA, conjunctiva and sclera clear  ENMT: No tonsillar erythema, exudates, or enlargement; Moist mucous membranes, Good dentition, No lesions  NECK: Supple, normal appearance, No JVD; Normal thyroid; Trachea midline  NERVOUS SYSTEM:  Alert & Oriented X3,  Motor Strength 5/5 B/L upper and lower extremities, sensation intact  CHEST/LUNG: Lungs clear to auscultation bilaterally, No rales, rhonchi, wheezing   HEART: Regular rate and rhythm; No murmurs, rubs, or gallops  ABDOMEN: Soft, Nontender, Nondistended; Bowel sounds present  EXTREMITIES:  2+ Peripheral Pulses, No clubbing, cyanosis, or edema  LYMPH: No lymphadenopathy noted  SKIN: No rashes or lesions;  Good capillary refill      LABS:  CBC Full  -  ( 07 Dec 2022 03:19 )  WBC Count : 11.69 K/uL  RBC Count : 2.47 M/uL  Hemoglobin : 7.4 g/dL  Hematocrit : 25.2 %  Platelet Count - Automated : 151 K/uL  Mean Cell Volume : 102.0 fl  Mean Cell Hemoglobin : 30.0 pg  Mean Cell Hemoglobin Concentration : 29.4 gm/dL  Auto Neutrophil # : x  Auto Lymphocyte # : x  Auto Monocyte # : x  Auto Eosinophil # : x  Auto Basophil # : x  Auto Neutrophil % : x  Auto Lymphocyte % : x  Auto Monocyte % : x  Auto Eosinophil % : x  Auto Basophil % : x    12-07    151<H>  |  116<H>  |  73<H>  ----------------------------<  99  4.2   |  31  |  3.69<H>    Ca    9.7      07 Dec 2022 03:19  Phos  4.9     12-07  Mg     2.4     12-07    TPro  5.8<L>  /  Alb  2.0<L>  /  TBili  0.3  /  DBili  x   /  AST  11  /  ALT  12  /  AlkPhos  63  12-07            RADIOLOGY & ADDITIONAL STUDIES REVIEWED:      [ ]GOALS OF CARE DISCUSSION WITH PATIENT/FAMILY/PROXY:    CRITICAL CARE TIME SPENT: 35 minutes INTERVAL HPI/OVERNIGHT EVENTS: BIPAP overnight, FiO2 down to 80%    Antimicrobial:  cefTRIAXone   IVPB 2000 milliGRAM(s) IV Intermittent every 24 hours    Cardiovascular:    Pulmonary:  albuterol    0.083% 2.5 milliGRAM(s) Nebulizer every 6 hours  albuterol    90 MICROgram(s) HFA Inhaler 1 Puff(s) Inhalation every 4 hours    Hematalogic:  heparin   Injectable 5000 Unit(s) SubCutaneous every 8 hours    Other:  bisacodyl Suppository 10 milliGRAM(s) Rectal daily PRN  chlorhexidine 2% Cloths 1 Application(s) Topical <User Schedule>  collagenase Ointment 1 Application(s) Topical two times a day  epoetin madyson-epbx (RETACRIT) Injectable 8000 Unit(s) IV Push <User Schedule>  finasteride 5 milliGRAM(s) Oral daily  levothyroxine 125 MICROGram(s) Oral daily  pantoprazole   Suspension 40 milliGRAM(s) Oral daily  polyethylene glycol 3350 17 Gram(s) Oral at bedtime  risperiDONE   Tablet 2 milliGRAM(s) Oral daily  senna Syrup 10 milliLiter(s) Oral at bedtime  sevelamer carbonate Powder 800 milliGRAM(s) Enteral Tube three times a day  tamsulosin 0.4 milliGRAM(s) Oral at bedtime  valproic  acid Syrup 750 milliGRAM(s) Oral every 6 hours  zinc oxide 20% Ointment 1 Application(s) Topical two times a day      Drug Dosing Weight  Height (cm): 177.8 (30 Nov 2022 18:45)  Weight (kg): 113.9 (30 Nov 2022 18:45)  BMI (kg/m2): 36 (30 Nov 2022 18:45)  BSA (m2): 2.3 (30 Nov 2022 18:45)    PMH/Social Hx/Fam Hx -reviewed admission note, no change since admission  PAST MEDICAL & SURGICAL HISTORY:  Hypothyroid      Hyperlipidemia      Ambulatory dysfunction      Anemia      History of BPH      CKD (chronic kidney disease)      Chronic obstructive pulmonary disease (COPD)      Bipolar disorder      HLD (hyperlipidemia)      BPH (benign prostatic hyperplasia)      Chronic diastolic congestive heart failure      Lymphedema      No significant past surgical history      T(C): 37.1 (12-07-22 @ 04:30), Max: 37.3 (12-06-22 @ 08:00)  HR: 99 (12-07-22 @ 07:00)  BP: 149/67 (12-07-22 @ 07:00)  BP(mean): 92 (12-07-22 @ 07:00)  ABP: --  ABP(mean): --  RR: 24 (12-07-22 @ 07:00)  SpO2: 93% (12-07-22 @ 07:00)  Wt(kg): --          12-06 @ 07:01  -  12-07 @ 07:00  --------------------------------------------------------  IN: 650 mL / OUT: 2470 mL / NET: -1820 mL            PHYSICAL EXAM:    GENERAL: NAD, BIPAP  HEAD:  Atraumatic, Normocephalic  EYES: EOMI, PERRLA, conjunctiva and sclera clear  NECK: Supple, normal appearance,  NERVOUS SYSTEM:  Awakes to noise, but does not follow commands  CHEST/LUNG: Lungs clear to auscultation bilaterally, No rales, rhonchi, wheezing   HEART: Regular rate and rhythm; No murmurs, rubs, or gallops  ABDOMEN: Soft, Nontender, Distended; Bowel sounds present  EXTREMITIES:  Bilateral lower extremity edema      LABS:  CBC Full  -  ( 07 Dec 2022 03:19 )  WBC Count : 11.69 K/uL  RBC Count : 2.47 M/uL  Hemoglobin : 7.4 g/dL  Hematocrit : 25.2 %  Platelet Count - Automated : 151 K/uL  Mean Cell Volume : 102.0 fl  Mean Cell Hemoglobin : 30.0 pg  Mean Cell Hemoglobin Concentration : 29.4 gm/dL  Auto Neutrophil # : x  Auto Lymphocyte # : x  Auto Monocyte # : x  Auto Eosinophil # : x  Auto Basophil # : x  Auto Neutrophil % : x  Auto Lymphocyte % : x  Auto Monocyte % : x  Auto Eosinophil % : x  Auto Basophil % : x    12-07    151<H>  |  116<H>  |  73<H>  ----------------------------<  99  4.2   |  31  |  3.69<H>    Ca    9.7      07 Dec 2022 03:19  Phos  4.9     12-07  Mg     2.4     12-07    TPro  5.8<L>  /  Alb  2.0<L>  /  TBili  0.3  /  DBili  x   /  AST  11  /  ALT  12  /  AlkPhos  63  12-07            RADIOLOGY & ADDITIONAL STUDIES REVIEWED:      [ ]GOALS OF CARE DISCUSSION WITH PATIENT/FAMILY/PROXY:    CRITICAL CARE TIME SPENT: 35 minutes INTERVAL HPI/OVERNIGHT EVENTS: BIPAP overnight, FiO2 down to 80%    Antimicrobial:  cefTRIAXone   IVPB 2000 milliGRAM(s) IV Intermittent every 24 hours    Cardiovascular:    Pulmonary:  albuterol    0.083% 2.5 milliGRAM(s) Nebulizer every 6 hours  albuterol    90 MICROgram(s) HFA Inhaler 1 Puff(s) Inhalation every 4 hours    Hematalogic:  heparin   Injectable 5000 Unit(s) SubCutaneous every 8 hours    Other:  bisacodyl Suppository 10 milliGRAM(s) Rectal daily PRN  chlorhexidine 2% Cloths 1 Application(s) Topical <User Schedule>  collagenase Ointment 1 Application(s) Topical two times a day  epoetin madyson-epbx (RETACRIT) Injectable 8000 Unit(s) IV Push <User Schedule>  finasteride 5 milliGRAM(s) Oral daily  levothyroxine 125 MICROGram(s) Oral daily  pantoprazole   Suspension 40 milliGRAM(s) Oral daily  polyethylene glycol 3350 17 Gram(s) Oral at bedtime  risperiDONE   Tablet 2 milliGRAM(s) Oral daily  senna Syrup 10 milliLiter(s) Oral at bedtime  sevelamer carbonate Powder 800 milliGRAM(s) Enteral Tube three times a day  tamsulosin 0.4 milliGRAM(s) Oral at bedtime  valproic  acid Syrup 750 milliGRAM(s) Oral every 6 hours  zinc oxide 20% Ointment 1 Application(s) Topical two times a day      Drug Dosing Weight  Height (cm): 177.8 (30 Nov 2022 18:45)  Weight (kg): 113.9 (30 Nov 2022 18:45)  BMI (kg/m2): 36 (30 Nov 2022 18:45)  BSA (m2): 2.3 (30 Nov 2022 18:45)    PMH/Social Hx/Fam Hx -reviewed admission note, no change since admission  PAST MEDICAL & SURGICAL HISTORY:  Hypothyroid      Hyperlipidemia      Ambulatory dysfunction      Anemia      History of BPH      CKD (chronic kidney disease)      Chronic obstructive pulmonary disease (COPD)      Bipolar disorder      HLD (hyperlipidemia)      BPH (benign prostatic hyperplasia)      Chronic diastolic congestive heart failure      Lymphedema      No significant past surgical history      T(C): 37.1 (12-07-22 @ 04:30), Max: 37.3 (12-06-22 @ 08:00)  HR: 99 (12-07-22 @ 07:00)  BP: 149/67 (12-07-22 @ 07:00)  BP(mean): 92 (12-07-22 @ 07:00)  ABP: --  ABP(mean): --  RR: 24 (12-07-22 @ 07:00)  SpO2: 93% (12-07-22 @ 07:00)  Wt(kg): --          12-06 @ 07:01  -  12-07 @ 07:00  --------------------------------------------------------  IN: 650 mL / OUT: 2470 mL / NET: -1820 mL            PHYSICAL EXAM:    GENERAL: NAD, BIPAP  HEAD:  Atraumatic, Normocephalic  EYES: EOMI, PERRLA, conjunctiva and sclera clear  NECK: Supple, normal appearance,  NERVOUS SYSTEM:  Awakes to noise, but does not follow commands  CHEST/LUNG: Lungs clear to auscultation on Left, Right sounds diminished, No rales, rhonchi, wheezing   HEART: Regular rate and rhythm; No murmurs, rubs, or gallops  ABDOMEN: Soft, Nontender, Distended; Bowel sounds present  EXTREMITIES:  Bilateral lower extremity edema      LABS:  CBC Full  -  ( 07 Dec 2022 03:19 )  WBC Count : 11.69 K/uL  RBC Count : 2.47 M/uL  Hemoglobin : 7.4 g/dL  Hematocrit : 25.2 %  Platelet Count - Automated : 151 K/uL  Mean Cell Volume : 102.0 fl  Mean Cell Hemoglobin : 30.0 pg  Mean Cell Hemoglobin Concentration : 29.4 gm/dL  Auto Neutrophil # : x  Auto Lymphocyte # : x  Auto Monocyte # : x  Auto Eosinophil # : x  Auto Basophil # : x  Auto Neutrophil % : x  Auto Lymphocyte % : x  Auto Monocyte % : x  Auto Eosinophil % : x  Auto Basophil % : x    12-07    151<H>  |  116<H>  |  73<H>  ----------------------------<  99  4.2   |  31  |  3.69<H>    Ca    9.7      07 Dec 2022 03:19  Phos  4.9     12-07  Mg     2.4     12-07    TPro  5.8<L>  /  Alb  2.0<L>  /  TBili  0.3  /  DBili  x   /  AST  11  /  ALT  12  /  AlkPhos  63  12-07            RADIOLOGY & ADDITIONAL STUDIES REVIEWED:      [ ]GOALS OF CARE DISCUSSION WITH PATIENT/FAMILY/PROXY:    CRITICAL CARE TIME SPENT: 35 minutes

## 2022-12-07 NOTE — PROGRESS NOTE ADULT - ATTENDING COMMENTS
62 year old morbid obese male, from [facility], has past medical hx of ambulatory dysfunction, ACD, bipolar disorder, BPH, CKD, HLD, PVD, COPD not on oxygen, hypothyroidism, RBBB, lymphedema, CHF (last echo 2/22 normal EF, GIDD). Presented for hypotension 73/37 mmHg and bradycardia (40's). Patient admitted to ICU for shock and acute encephalopathy. Now intubated on mechanical ventilation.     DX:  - Shock - septic vs hypovolemia  - Bilateral lower cellulitis   - Acute encephalopathy   - Sinus bradycardia  - HAGMA   - SHEYLA on CKD  - Anemia   - COPD not on oxygen at home   - HLD   - BPH  - Hypothyroidism   - Mood disorder       Plan:  - No further myoclonic activity this morning   - D/C  Keppra   - EEG with out seizures  - Depressed mental status maybe due to meds ( Keppra) toxic metabolic   - Neurology consult noted, recommend prolonged EEG monitoring  - CT head unremarkable   - Monitor respiratory status   - CXR with complete right lung atelectasis , failed trail of HFNC   - Rotated pat right side up with aggressive Chest PT and suctioning   - Repeated CXR  improved right atelectasis with RLL atelectasis   - Cont. antibiotics last day   - CT scan showing basilar pneumonia   - ID following   - Cultures negative   - Monitor renal function  - HD today , Worst Renal failure   - Viera for urinary obstruction   - HD as per nephrology   - Tube feedings via NGT   - Cont. home psych medications  - Cont. Levothyroxine  - DVT and stress ulcer prophylaxis 62 year old morbid obese male, from [facility], has past medical hx of ambulatory dysfunction, ACD, bipolar disorder, BPH, CKD, HLD, PVD, COPD not on oxygen, hypothyroidism, RBBB, lymphedema, CHF (last echo 2/22 normal EF, GIDD). Presented for hypotension 73/37 mmHg and bradycardia (40's). Patient admitted to ICU for shock and acute encephalopathy. Now intubated on mechanical ventilation.     DX:  - Shock - septic vs hypovolemia  - Bilateral lower cellulitis   - Acute encephalopathy   - Sinus bradycardia  - HAGMA   - SHEYLA on CKD  - Anemia   - COPD not on oxygen at home   - HLD   - BPH  - Hypothyroidism   - Mood disorder       Plan:  - No further myoclonic activity this morning   - D/C  Keppra   - EEG with out seizures  - Depressed mental status maybe due to meds ( Keppra) toxic metabolic   - Neurology consult noted, recommend prolonged EEG monitoring  - CT head unremarkable   - Monitor respiratory status   - CXR with complete right lung atelectasis , failed trail of HFNC   - Rotated pat right side up with aggressive Chest PT and suctioning   - Repeated CXR  improved right atelectasis with RLL atelectasis   - Cont. antibiotics last day   - CT scan showing basilar pneumonia   - ID following   - Cultures negative   - Monitor renal function  - HD as per Neph  - Viera for urinary obstruction   - HD as per nephrology   - Tube feedings via NGT   - Cont. home psych medications  - Cont. Levothyroxine  - DVT and stress ulcer prophylaxis

## 2022-12-07 NOTE — PROGRESS NOTE ADULT - ASSESSMENT
62 year old morbid obese male, from [facility], has past medical hx of ambulatory dysfunction, ACD, bipolar disorder, BPH, CKD, HLD, PVD, COPD not on oxygen, hypothyroidism, RBBB, lymphedema, CHF (last echo 2/22 normal EF, GIDD) was BIBEMS for hypotension 73/37 mmHg and bradycardia (40's). Patient admitted to ICU for shock and acute encephalopathy.     DX:  - Shock septic vs hypovolemia  - LE cellulitis   - Acute encephalopathy   - Sinus bradycardia  - HAGMA   - SHEYLA on CKD  - Anemia   - COPD not on oxygen at home   - HLD   - BPH  - Hypothyroidism   - Mood disorder       =================== Neuro============================  #Acute Encephalopathy; likely in the setting of acidemia, infection, uremia    Lethargic, fighting vent    Pain meds with Tylenol   CT Head negative for acute bleed/swelling  Off sedation  Alert and Oriented X2    #Seizure  - Home med includes valproic acid  - Patient is having repeated muscle spasms, improved on Versed but not fully stopped  - Still having muscle movements after dialysis which brought down BUN  - Started propofol instead of precedex  - Valproic acid level low, likely due to missed doses when patient was originally admitted.  - cont keppra and valproate as per neuro recs, prefer valproate as less nephrotoxic  - spot EEG 12/3 - moderate generalized slowing  - f/u 24 hr EEG (12/4)  - Valproic Acid to 750mg Q6  - Valproic Acid Trough in AM 54    ================= Cardiovascular==========================  Sinus bradycardia  - EKG sinus bradycardia, old RBBB  - TTE 12/1 - LVEF 60-65%  - trops neg  - Given normal EF, no changes in EKG, cardiogenic shock less likely    Hypotension  - off pressers      ================- Pulm=================================  COPD  - Patient with hx of COPD not in exacerbation   - Chest X ray Showed bilateral patchy infiltrates, no concern for fluid overload    - Oxygen supplementation as needed to maintain SpO2 of 88-92%  - C/w home meds   - On BiPAP, saturated 90s; transition to high flow to assess mentation, dysphagia screen      ==================ID===================================  Shock  - Concern for sepsis vs hypovolemic   - Empirical treatment with Cefepime for LE cellulitis and for pseudomonal coverage  - S/p 4L NS   - Started on pressors   F/u lactate 1.4, procalcitonin, Bcx, Ucx, CK NGTD  - ID consulted Dr Nolasco     ================= Nephro================================  SHEYLA on CKD  - On admission, patient with Cr 6.4>>5.6, baseline 3.7; stable at baseline today  - Worsening CKD most likely a combination of obstructive vs pre- renal   - Viera catheter placed in ED with 1000 cc of UO  - -58mL in the past 24 hours  - Avoid nephrotoxins, NSAIDS, ACEI and ARBS  - Monitor BMP daily  - Nephro Dr Valenzuela consulted  - S/p Dialysis 12/2 and 12/3, Creatnine near baseline, mental status improved  Dialysis held today 12/6    HAGMA  - Most likely from uremia   - Nephro Dr Valenzuela consulted  - Hold dialysis today as BUN/Cr near baseline    Hypernatremia  [ ] Free Water 300ml Q8 via NGT    =================GI====================================  NGT in place  Start tube feeds  Resume psych PO medications- Wellbutrin  Swallow evaluation  Bowel Regimen Give tonight,  Possible disimpaction tomorrow    ================ Heme==================================  Anemia   - Anemia of chronic disease   - Monitor CBC daily   - Hgb Stable no signs of active bleed    =================Endocrine===============================  Hypothyroidism  - F/u TSH Normal   - C/w home meds when not NPO     ================= Skin/Catheters============================  Peripheral IV lines   Viera catheter    Sacral and Posterior Thigh Ulcers, Wound Care Consulted  [ ] f/u removal of shiley     =================Prophylaxis =============================  DVT prophylaxis with Heparin SQ daily   GI prophylaxis with PPI daily    ==================GOC==================================  FULL CODE    62 year old morbid obese male, from [facility], has past medical hx of ambulatory dysfunction, ACD, bipolar disorder, BPH, CKD, HLD, PVD, COPD not on oxygen, hypothyroidism, RBBB, lymphedema, CHF (last echo 2/22 normal EF, GIDD) was BIBEMS for hypotension 73/37 mmHg and bradycardia (40's). Patient admitted to ICU for shock and acute encephalopathy.     DX:  - Shock septic vs hypovolemia  - LE cellulitis   - Acute encephalopathy   - Sinus bradycardia  - HAGMA   - SHEYLA on CKD  - Anemia   - COPD not on oxygen at home   - HLD   - BPH  - Hypothyroidism   - Mood disorder       =================== Neuro============================  #Acute Encephalopathy; likely in the setting of acidemia, infection, uremia    Lethargic, fighting vent    Pain meds with Tylenol   CT Head negative for acute bleed/swelling  Off sedation  Awakens to voice, but does not follow commands    #Seizure  - Home med includes valproic acid  - Patient is having repeated muscle spasms, improved on Versed but not fully stopped  - Still having muscle movements after dialysis which brought down BUN  - Started propofol instead of precedex  - Valproic acid level low, likely due to missed doses when patient was originally admitted.  - DC keppra per neuro recs, prefer valproate as less nephrotoxic  - spot EEG 12/3 - moderate generalized slowing  - f/u 24 hr EEG (12/4)  - Valproic Acid to 750mg Q6  - Valproic Acid Trough in AM 54    ================= Cardiovascular==========================  Sinus bradycardia  - EKG sinus bradycardia, old RBBB  - TTE 12/1 - LVEF 60-65%  - trops neg  - Given normal EF, no changes in EKG, cardiogenic shock less likely    Hypotension  - off pressers      ================- Pulm=================================  COPD  - Patient with hx of COPD not in exacerbation   - Chest X ray Showed bilateral patchy infiltrates, no concern for fluid overload    - Oxygen supplementation as needed to maintain SpO2 of 88-92%  - C/w home meds   - On BiPAP, saturated 90s; transition to high flow to assess mentation, dysphagia screen      ==================ID===================================  Shock  - Concern for sepsis vs hypovolemic   - Empirical treatment with Cefepime for LE cellulitis and for pseudomonal coverage  - S/p 4L NS   - Started on pressors   F/u lactate 1.4, procalcitonin, Bcx, Ucx, CK NGTD  - ID consulted Dr Nolasco     ================= Nephro================================  SHEYLA on CKD  - On admission, patient with Cr 6.4>>5.6, baseline 3.7; stable at baseline today  - Worsening CKD most likely a combination of obstructive vs pre- renal   - Viera catheter placed in ED with 1000 cc of UO  - -58mL in the past 24 hours  - Avoid nephrotoxins, NSAIDS, ACEI and ARBS  - Monitor BMP daily  - Nephro Dr Valenzuela consulted  - S/p Dialysis 12/2 and 12/3, Creatnine near baseline, mental status improved  Dialysis held today 12/7    HAGMA  - Most likely from uremia   - Nephro Dr Valenzuela consulted  - Hold dialysis today as BUN/Cr near baseline    Hypernatremia  [ ] D5W 1L  [ ] follow up BMP tonight 8PM    =================GI====================================  NGT in place  Start tube feeds  Resume psych PO medications- Wellbutrin  Swallow evaluation  Bowel Regimen Give tonight,  Possible disimpaction tomorrow    ================ Heme==================================  Anemia   - Anemia of chronic disease   - Monitor CBC daily   - Hgb Stable no signs of active bleed    =================Endocrine===============================  Hypothyroidism  - F/u TSH Normal   - C/w home meds when not NPO     ================= Skin/Catheters============================  Peripheral IV lines   Viera catheter    Sacral and Posterior Thigh Ulcers, Wound Care Consulted  [ ] f/u removal of shiley     =================Prophylaxis =============================  DVT prophylaxis with Heparin SQ daily   GI prophylaxis with PPI daily    ==================GOC==================================  FULL CODE    62 year old morbid obese male, from [facility], has past medical hx of ambulatory dysfunction, ACD, bipolar disorder, BPH, CKD, HLD, PVD, COPD not on oxygen, hypothyroidism, RBBB, lymphedema, CHF (last echo 2/22 normal EF, GIDD) was BIBEMS for hypotension 73/37 mmHg and bradycardia (40's). Patient admitted to ICU for shock and acute encephalopathy.     DX:  - Shock septic vs hypovolemia  - LE cellulitis   - Acute encephalopathy   - Sinus bradycardia  - HAGMA   - SHEYLA on CKD  - Anemia   - COPD not on oxygen at home   - HLD   - BPH  - Hypothyroidism   - Mood disorder       =================== Neuro============================  #Acute Encephalopathy; likely in the setting of acidemia, infection, uremia    Lethargic, fighting vent    Pain meds with Tylenol   CT Head negative for acute bleed/swelling  Off sedation  Awakens to voice, but does not follow commands    #Seizure  - Home med includes valproic acid  - Patient is having repeated muscle spasms, improved on Versed but not fully stopped  - Still having muscle movements after dialysis which brought down BUN  - Started propofol instead of precedex  - Valproic acid level low, likely due to missed doses when patient was originally admitted.  - DC keppra per neuro recs, prefer valproate as less nephrotoxic  - spot EEG 12/3 - moderate generalized slowing  - f/u 24 hr EEG (12/4)  - Valproic Acid to 750mg Q6    ================= Cardiovascular==========================  Sinus bradycardia  - EKG sinus bradycardia, old RBBB  - TTE 12/1 - LVEF 60-65%  - trops neg  - Given normal EF, no changes in EKG, cardiogenic shock less likely    Hypotension  - off pressers      ================- Pulm=================================  COPD  - Patient with hx of COPD not in exacerbation   - Chest X ray Showed bilateral patchy infiltrates, no concern for fluid overload    - CXR 12/7 showed severe R Atelectasis, likely due to mucous plugging, chest PT, R side elevated  [ ] Repeat CXR 2PM today  - Oxygen supplementation as needed to maintain SpO2 of 88-92%  - On BiPAP, saturated 90s; transition to high flow to assess mentation, dysphagia screen      ==================ID===================================  Shock  - Concern for sepsis vs hypovolemic   - Empirical treatment with CTX for LE cellulitis and for pseudomonal coverage  - S/p 4L NS   - Started on pressors   F/u lactate 1.4, procalcitonin, Bcx, Ucx, CK NGTD  - ID consulted Dr Nolasco     ================= Nephro================================  SHEYLA on CKD  - On admission, patient with Cr 6.4>>5.6, baseline 3.7; stable at baseline today  - Worsening CKD most likely a combination of obstructive vs pre- renal   - Viera catheter placed in ED with 1000 cc of UO  - -58mL in the past 24 hours  - Avoid nephrotoxins, NSAIDS, ACEI and ARBS  - Monitor BMP daily  - Nephro Dr Valenzuela consulted  - S/p Dialysis 12/2 and 12/3, Creatnine near baseline, mental status improved  Dialysis held today 12/7    HAGMA  - Most likely from uremia   - Nephro Dr Valenzuela consulted  - Hold dialysis today as BUN/Cr near baseline    Hypernatremia  [ ] D5W 1L  [ ] follow up BMP tonight 8PM    =================GI====================================  NGT in place  Start tube feeds  Resume psych PO medications- Wellbutrin  Swallow evaluation  Bowel Regimen Give tonight,  Possible disimpaction tomorrow    ================ Heme==================================  Anemia   - Anemia of chronic disease   - Monitor CBC daily   - Hgb Stable no signs of active bleed    =================Endocrine===============================  Hypothyroidism  - F/u TSH Normal   - C/w home meds when not NPO     ================= Skin/Catheters============================  Peripheral IV lines   Viera catheter    Sacral and Posterior Thigh Ulcers, Wound Care Consulted  [ ] f/u removal of shiley     =================Prophylaxis =============================  DVT prophylaxis with Heparin SQ daily   GI prophylaxis with PPI daily    ==================GOC==================================  FULL CODE    62 year old morbid obese male, from [facility], has past medical hx of ambulatory dysfunction, ACD, bipolar disorder, BPH, CKD, HLD, PVD, COPD not on oxygen, hypothyroidism, RBBB, lymphedema, CHF (last echo 2/22 normal EF, GIDD) was BIBEMS for hypotension 73/37 mmHg and bradycardia (40's). Patient admitted to ICU for shock and acute encephalopathy.     DX:  - Shock septic vs hypovolemia  - LE cellulitis   - Acute encephalopathy   - Sinus bradycardia  - HAGMA   - SHEYLA on CKD  - Anemia   - COPD not on oxygen at home   - HLD   - BPH  - Hypothyroidism   - Mood disorder       =================== Neuro============================  #Acute Encephalopathy; likely in the setting of acidemia, infection, uremia    Lethargic, fighting vent    Pain meds with Tylenol   CT Head negative for acute bleed/swelling  Off sedation  Awakens to voice, but does not follow commands    #Seizure  - Home med includes valproic acid  - Patient is having repeated muscle spasms, improved on Versed but not fully stopped  - Still having muscle movements after dialysis which brought down BUN  - Started propofol instead of precedex  - Valproic acid level low, likely due to missed doses when patient was originally admitted.  - DC keppra per neuro recs, prefer valproate as less nephrotoxic  - spot EEG 12/3 - moderate generalized slowing  - f/u 24 hr EEG (12/4)  - Valproic Acid to 750mg Q6    ================= Cardiovascular==========================  Sinus bradycardia  - EKG sinus bradycardia, old RBBB  - TTE 12/1 - LVEF 60-65%  - trops neg  - Given normal EF, no changes in EKG, cardiogenic shock less likely    Hypotension  - off pressers      ================- Pulm=================================  COPD  - Patient with hx of COPD not in exacerbation   - Chest X ray Showed bilateral patchy infiltrates, no concern for fluid overload    - CXR 12/7 showed severe R Atelectasis, likely due to mucous plugging, chest PT, R side elevated  [ ] Repeat CXR 2PM today  - Low threshold for intubation given poor respiratory status  - Oxygen supplementation as needed to maintain SpO2 of 88-92%      ==================ID===================================  Shock  - Concern for sepsis vs hypovolemic   - Empirical treatment with CTX for LE cellulitis and for pseudomonal coverage  - S/p 4L NS   - Started on pressors   F/u lactate 1.4, procalcitonin, Bcx, Ucx, CK NGTD  - ID consulted Dr Nolasco     ================= Nephro================================  SHEYLA on CKD  - On admission, patient with Cr 6.4>>5.6, baseline 3.7; stable at baseline today  - Worsening CKD most likely a combination of obstructive vs pre- renal   - Viera catheter placed in ED with 1000 cc of UO  - -58mL in the past 24 hours  - Avoid nephrotoxins, NSAIDS, ACEI and ARBS  - Monitor BMP daily  - Nephro Dr Valenzuela consulted  - S/p Dialysis 12/2 and 12/3, Creatnine near baseline, mental status improved  Dialysis held today 12/7    HAGMA  - Most likely from uremia   - Nephro Dr Valenzuela consulted  - Hold dialysis today as BUN/Cr near baseline    Hypernatremia  [ ] D5W 1L  [ ] follow up BMP tonight 8PM    =================GI====================================  NGT in place  Start tube feeds  Resume psych PO medications- Wellbutrin  Swallow evaluation  Bowel Regimen Give tonight,  Possible disimpaction tomorrow    ================ Heme==================================  Anemia   - Anemia of chronic disease   - Monitor CBC daily   - Hgb Stable no signs of active bleed    =================Endocrine===============================  Hypothyroidism  - F/u TSH Normal   - C/w home meds when not NPO     ================= Skin/Catheters============================  Peripheral IV lines   Viera catheter    Sacral and Posterior Thigh Ulcers, Wound Care Consulted  [ ] f/u removal of shiley     =================Prophylaxis =============================  DVT prophylaxis with Heparin SQ daily   GI prophylaxis with PPI daily    ==================GOC==================================  FULL CODE

## 2022-12-07 NOTE — PROGRESS NOTE ADULT - SUBJECTIVE AND OBJECTIVE BOX
NEPHROLOGY MEDICAL CARE, [facility] - Dr. Taj Valenzuela/ Dr. Hazel Solis/ Dr. Cosmo Root/ Dr. Fang Gifford    Patient was seen and examined at bedside.    CC: pt on bipap and restless    Vital Signs Last 24 Hrs  T(C): 37.2 (07 Dec 2022 09:00), Max: 37.2 (06 Dec 2022 12:00)  T(F): 99 (07 Dec 2022 09:00), Max: 99 (06 Dec 2022 12:00)  HR: 98 (07 Dec 2022 11:00) (77 - 113)  BP: 133/47 (07 Dec 2022 11:00) (96/45 - 150/81)  BP(mean): 69 (07 Dec 2022 11:00) (59 - 103)  RR: 19 (07 Dec 2022 11:00) (15 - 38)  SpO2: 98% (07 Dec 2022 11:00) (83% - 100%)    Parameters below as of 07 Dec 2022 11:00  Patient On (Oxygen Delivery Method): BiPAP/CPAP    O2 Concentration (%): 70    12-06 @ 07:01  -  12-07 @ 07:00  --------------------------------------------------------  IN: 650 mL / OUT: 2470 mL / NET: -1820 mL    12-07 @ 07:01 - 12-07 @ 11:10  --------------------------------------------------------  IN: 0 mL / OUT: 450 mL / NET: -450 mL        PHYSICAL EXAM:  General: NAD on bipap; obese.  Eyes: conjunctiva and sclera clear  ENMT: Atraumatic, Normocephalic; NGT  Respiratory: Bilateral poor air entry  Cardiovascular: S1S2+; no m/r/g  Gastrointestinal: Soft, Non-tender, Nondistended; Bowel sounds present,   : daley's cath with yellowish urine.  Neuro:  confused.  Ext:  vascular skin changes of both legs; No Cyanosis  Skin: No visible rashes  Dialysis Access: Rt femoral shiley    MEDICATIONS:  MEDICATIONS  (STANDING):  albuterol    0.083% 2.5 milliGRAM(s) Nebulizer every 6 hours  albuterol    90 MICROgram(s) HFA Inhaler 1 Puff(s) Inhalation every 4 hours  albuterol/ipratropium for Nebulization 3 milliLiter(s) Nebulizer every 6 hours  cefTRIAXone   IVPB 2000 milliGRAM(s) IV Intermittent every 24 hours  chlorhexidine 2% Cloths 1 Application(s) Topical <User Schedule>  collagenase Ointment 1 Application(s) Topical two times a day  dextrose 5%. 1000 milliLiter(s) (100 mL/Hr) IV Continuous <Continuous>  finasteride 5 milliGRAM(s) Oral daily  heparin   Injectable 5000 Unit(s) SubCutaneous every 8 hours  levothyroxine 125 MICROGram(s) Oral daily  pantoprazole  Injectable 40 milliGRAM(s) IV Push daily  polyethylene glycol 3350 17 Gram(s) Oral at bedtime  risperiDONE Injectable, Long Acting 25 milliGRAM(s) IntraMuscular once  senna Syrup 10 milliLiter(s) Oral at bedtime  sevelamer carbonate Powder 800 milliGRAM(s) Enteral Tube three times a day  tamsulosin 0.4 milliGRAM(s) Oral at bedtime  valproic  acid Syrup 750 milliGRAM(s) Oral every 6 hours  zinc oxide 20% Ointment 1 Application(s) Topical two times a day    MEDICATIONS  (PRN):  bisacodyl Suppository 10 milliGRAM(s) Rectal daily PRN Constipation          LABS:                        7.4    11.69 )-----------( 151      ( 07 Dec 2022 03:19 )             25.2     12-07    151<H>  |  116<H>  |  73<H>  ----------------------------<  99  4.2   |  31  |  3.69<H>    Ca    9.7      07 Dec 2022 03:19  Phos  4.9     12-07  Mg     2.4     12-07    TPro  5.8<L>  /  Alb  2.0<L>  /  TBili  0.3  /  DBili  x   /  AST  11  /  ALT  12  /  AlkPhos  63  12-07        Magnesium, Serum: 2.4 mg/dL (12-07 @ 03:19)  Phosphorus Level, Serum: 4.9 mg/dL (12-07 @ 03:19)    Urine studies    PTH and Vit D:

## 2022-12-07 NOTE — CHART NOTE - NSCHARTNOTEFT_GEN_A_CORE
Assessment:     Factors impacting intake: [ ] none [ ] nausea  [ ] vomiting [ ] diarrhea [ ] constipation  [ ]chewing problems [ ] swallowing issues  [ ] other:     Diet Presciption: Diet, NPO with Tube Feed:   Tube Feeding Modality: Nasogastric  Nepro with Carb Steady  Total Volume for 24 Hours (mL): 600  Continuous  Starting Tube Feed Rate {mL per Hour}: 10  Increase Tube Feed Rate by (mL): 10     Every 4 hours  Until Goal Tube Feed Rate (mL per Hour): 25  Tube Feed Duration (in Hours): 24  Tube Feed Start Time: 09:15 (22 @ 09:07)    Intake:     Daily Weight in k.3 (07 Dec 2022 07:00)  Weight in k (06 Dec 2022 08:00)  Weight in k.4 (05 Dec 2022 07:00)  Weight in k.6 (04 Dec 2022 07:00)  Weight in k.9 (03 Dec 2022 07:00)  Weight in k.3 (02 Dec 2022 07:00)  Weight in k.5 (01 Dec 2022 07:00)    % Weight Change    Pertinent Medications: MEDICATIONS  (STANDING):  albuterol    0.083% 2.5 milliGRAM(s) Nebulizer every 6 hours  albuterol    90 MICROgram(s) HFA Inhaler 1 Puff(s) Inhalation every 4 hours  albuterol/ipratropium for Nebulization 3 milliLiter(s) Nebulizer every 6 hours  cefTRIAXone   IVPB 2000 milliGRAM(s) IV Intermittent every 24 hours  chlorhexidine 2% Cloths 1 Application(s) Topical <User Schedule>  collagenase Ointment 1 Application(s) Topical two times a day  dextrose 5%. 1000 milliLiter(s) (100 mL/Hr) IV Continuous <Continuous>  finasteride 5 milliGRAM(s) Oral daily  heparin   Injectable 5000 Unit(s) SubCutaneous every 8 hours  levothyroxine 125 MICROGram(s) Oral daily  pantoprazole   Suspension 40 milliGRAM(s) Oral daily  polyethylene glycol 3350 17 Gram(s) Oral at bedtime  risperiDONE   Tablet 2 milliGRAM(s) Oral daily  senna Syrup 10 milliLiter(s) Oral at bedtime  sevelamer carbonate Powder 800 milliGRAM(s) Enteral Tube three times a day  tamsulosin 0.4 milliGRAM(s) Oral at bedtime  valproic  acid Syrup 750 milliGRAM(s) Oral every 6 hours  zinc oxide 20% Ointment 1 Application(s) Topical two times a day    MEDICATIONS  (PRN):  bisacodyl Suppository 10 milliGRAM(s) Rectal daily PRN Constipation    Pertinent Labs:  Na151 mmol/L<H> Glu 99 mg/dL K+ 4.2 mmol/L Cr  3.69 mg/dL<H> BUN 73 mg/dL<H>  Phos 4.9 mg/dL<H>  Alb 2.0 g/dL<L>     CAPILLARY BLOOD GLUCOSE      POCT Blood Glucose.: 112 mg/dL (07 Dec 2022 05:34)  POCT Blood Glucose.: 98 mg/dL (06 Dec 2022 23:15)  POCT Blood Glucose.: 90 mg/dL (06 Dec 2022 16:33)  POCT Blood Glucose.: 94 mg/dL (06 Dec 2022 14:13)      Skin:     Estimated Needs:   [ ] no change since previous assessment  [ ] recalculated:     Previous Nutrition Diagnosis:   [ ] Inadequate Energy Intake [ ]Inadequate Oral Intake [ ] Excessive Energy Intake   [ ] Underweight [ ] Increased Nutrient Needs [ ] Overweight/Obesity  [ ] Swallowing Difficult   [ ] Altered GI Function [ ] Unintended Weight Loss [ ] Food & Nutrition Related Knowledge Deficit [ ] Malnutrition   [ ] Not Ready for Diet/Life Style Changes     Nutrition Diagnosis is [ ] ongoing  [ ] Improving   [ ] resolved [ ] not applicable     New Nutrition Diagnosis: [ ] not applicable       Interventions:   Recommend  [ ] Change Diet To:  [ ] Nutrition Supplement  [ ] Nutrition Support  [ ] Other:     Monitoring and Evaluation:   [ ] PO intake [ x ] Tolerance to diet prescription [ x ] weights [ x ] labs[ x ] follow up per protocol  [ ] other: Assessment:      Nutrition reassessment for follow-up. Chart reviewed, s/p extubation 22, visited in ICU, lethargy/confused, NGT in place, TF on hold at present due to BiPAP per RN; s/o HD , 12/3, on hold 22 per MD, Nephrology on the case     Factors impacting intake: [ X ] swallowing issues  [ X ] other: acute on chronic comorbidities including shock, acute encephalopathy, SHEYLA on CKD     Diet Prescription:   Diet, NPO with Tube Feed:   Tube Feeding Modality: Nasogastric  Nepro with Carb Steady  Total Volume for 24 Hours (mL): 600  Continuous  Starting Tube Feed Rate {mL per Hour}: 10  Increase Tube Feed Rate by (mL): 10     Every 4 hours  Until Goal Tube Feed Rate (mL per Hour): 25  Tube Feed Duration (in Hours): 24  Tube Feed Start Time: 09:15 (22 @ 09:07)    Intake: TF on hold at present     Daily Weight in k.3 (07 Dec 2022 07:00)  Weight in k (06 Dec 2022 08:00)  Weight in k.4 (05 Dec 2022 07:00)  Weight in k.6 (04 Dec 2022 07:00)  Weight in k.9 (03 Dec 2022 07:00)  Weight in k.3 (02 Dec 2022 07:00)  Weight in k.5 (01 Dec 2022 07:00)    % Weight Change: Wts in [place] EMR reviewed, a bit fluctuated with downward trend, may due to scale/fluid variance, HD Tx; 2+ generalized edema, 3+ extremity edema     Pertinent Medications: MEDICATIONS  (STANDING):  albuterol    0.083% 2.5 milliGRAM(s) Nebulizer every 6 hours  albuterol    90 MICROgram(s) HFA Inhaler 1 Puff(s) Inhalation every 4 hours  albuterol/ipratropium for Nebulization 3 milliLiter(s) Nebulizer every 6 hours  cefTRIAXone   IVPB 2000 milliGRAM(s) IV Intermittent every 24 hours  chlorhexidine 2% Cloths 1 Application(s) Topical <User Schedule>  collagenase Ointment 1 Application(s) Topical two times a day  dextrose 5%. 1000 milliLiter(s) (100 mL/Hr) IV Continuous <Continuous>  finasteride 5 milliGRAM(s) Oral daily  heparin   Injectable 5000 Unit(s) SubCutaneous every 8 hours  levothyroxine 125 MICROGram(s) Oral daily  pantoprazole   Suspension 40 milliGRAM(s) Oral daily  polyethylene glycol 3350 17 Gram(s) Oral at bedtime  risperiDONE   Tablet 2 milliGRAM(s) Oral daily  senna Syrup 10 milliLiter(s) Oral at bedtime  sevelamer carbonate Powder 800 milliGRAM(s) Enteral Tube three times a day  tamsulosin 0.4 milliGRAM(s) Oral at bedtime  valproic  acid Syrup 750 milliGRAM(s) Oral every 6 hours  zinc oxide 20% Ointment 1 Application(s) Topical two times a day    MEDICATIONS  (PRN):  bisacodyl Suppository 10 milliGRAM(s) Rectal daily PRN Constipation    Pertinent Labs:  Na151 mmol/L<H> Glu 99 mg/dL K+ 4.2 mmol/L Cr  3.69 mg/dL<H> BUN 73 mg/dL<H>  Phos 4.9 mg/dL<H>  Alb 2.0 g/dL<L>     CAPILLARY BLOOD GLUCOSE    POCT Blood Glucose.: 112 mg/dL (07 Dec 2022 05:34)  POCT Blood Glucose.: 98 mg/dL (06 Dec 2022 23:15)  POCT Blood Glucose.: 90 mg/dL (06 Dec 2022 16:33)  POCT Blood Glucose.: 94 mg/dL (06 Dec 2022 14:13)    Skin: Pressure Injury: Unstagable x 3    Estimated Needs:   [ X ] no change since previous assessment  [ ] recalculated:     Previous Nutrition Diagnosis:   [ X ] Swallowing Difficult     Nutrition Diagnosis is [ X ] ongoing  [ ] Improving   [ ] resolved [ ] not applicable     New Nutrition Diagnosis: [ X ] not applicable       Interventions:   Recommend  [ X ] May consider to restart TF as medically feasible: Goal: Nepro @ 40 ml/h x 24 h  MD to adjust free water as needed  ( 960 ml, 1728 kcal, 78 g protein, 698 ml water daily); Add Prosource 30ml x tid daily as medically feasible (extra 180 kcal, 45 g protein daily)          May consider Swallow evaluation as medically feasible if for po intake trial   [ ] Nutrition Supplement  [ ] Nutrition Support  [ X ] Other: Discussed with RN    Monitoring and Evaluation:   [ ] PO intake [ x ] Tolerance to diet prescription [ x ] weights [ x ] labs[ x ] follow up per protocol  [ ] other: Assessment:      Nutrition reassessment for follow-up. Chart reviewed, s/p extubation 22, visited in ICU, lethargy/confused, NGT in place, TF on hold at present due to BiPAP per RN; s/o HD , 12/3, on hold 22 per MD, Nephrology on the case     Factors impacting intake: [ X ] swallowing issues  [ X ] other: acute on chronic comorbidities including shock, acute encephalopathy, SHEYLA on CKD     Diet Prescription:   Diet, NPO with Tube Feed:   Tube Feeding Modality: Nasogastric  Nepro with Carb Steady  Total Volume for 24 Hours (mL): 600  Continuous  Starting Tube Feed Rate {mL per Hour}: 10  Increase Tube Feed Rate by (mL): 10     Every 4 hours  Until Goal Tube Feed Rate (mL per Hour): 25  Tube Feed Duration (in Hours): 24  Tube Feed Start Time: 09:15 (22 @ 09:07)    Intake: TF on hold at present     Daily Weight in k.3 (07 Dec 2022 07:00)  Weight in k (06 Dec 2022 08:00)  Weight in k.4 (05 Dec 2022 07:00)  Weight in k.6 (04 Dec 2022 07:00)  Weight in k.9 (03 Dec 2022 07:00)  Weight in k.3 (02 Dec 2022 07:00)  Weight in k.5 (01 Dec 2022 07:00)    % Weight Change: Wts in [place] EMR reviewed, a bit fluctuated with downward trend, may due to scale/fluid variance, HD Tx; 2+ generalized edema, 3+ extremity edema     Pertinent Medications: MEDICATIONS  (STANDING):  albuterol    0.083% 2.5 milliGRAM(s) Nebulizer every 6 hours  albuterol    90 MICROgram(s) HFA Inhaler 1 Puff(s) Inhalation every 4 hours  albuterol/ipratropium for Nebulization 3 milliLiter(s) Nebulizer every 6 hours  cefTRIAXone   IVPB 2000 milliGRAM(s) IV Intermittent every 24 hours  chlorhexidine 2% Cloths 1 Application(s) Topical <User Schedule>  collagenase Ointment 1 Application(s) Topical two times a day  dextrose 5%. 1000 milliLiter(s) (100 mL/Hr) IV Continuous <Continuous>  finasteride 5 milliGRAM(s) Oral daily  heparin   Injectable 5000 Unit(s) SubCutaneous every 8 hours  levothyroxine 125 MICROGram(s) Oral daily  pantoprazole   Suspension 40 milliGRAM(s) Oral daily  polyethylene glycol 3350 17 Gram(s) Oral at bedtime  risperiDONE   Tablet 2 milliGRAM(s) Oral daily  senna Syrup 10 milliLiter(s) Oral at bedtime  sevelamer carbonate Powder 800 milliGRAM(s) Enteral Tube three times a day  tamsulosin 0.4 milliGRAM(s) Oral at bedtime  valproic  acid Syrup 750 milliGRAM(s) Oral every 6 hours  zinc oxide 20% Ointment 1 Application(s) Topical two times a day    MEDICATIONS  (PRN):  bisacodyl Suppository 10 milliGRAM(s) Rectal daily PRN Constipation    Pertinent Labs:  Na151 mmol/L<H> Glu 99 mg/dL K+ 4.2 mmol/L Cr  3.69 mg/dL<H> BUN 73 mg/dL<H>  Phos 4.9 mg/dL<H>  Alb 2.0 g/dL<L>     CAPILLARY BLOOD GLUCOSE    POCT Blood Glucose.: 112 mg/dL (07 Dec 2022 05:34)  POCT Blood Glucose.: 98 mg/dL (06 Dec 2022 23:15)  POCT Blood Glucose.: 90 mg/dL (06 Dec 2022 16:33)  POCT Blood Glucose.: 94 mg/dL (06 Dec 2022 14:13)    Skin: Pressure Injury: Unstagable x 3    Estimated Needs:   [ X ] no change since previous assessment  [ ] recalculated:     Previous Nutrition Diagnosis:   [ X ] Swallowing Difficult     Nutrition Diagnosis is [ X ] ongoing  [ ] Improving   [ ] resolved [ ] not applicable     New Nutrition Diagnosis: [ X ] not applicable       Interventions:   Recommend  [ X ] May consider to restart TF as medically feasible: Goal: Nepro @ 40 ml/h x 24 h  MD to adjust free water as needed  ( 960 ml, 1728 kcal, 78 g protein, 698 ml water daily); Add Prosource 30ml x tid daily as medically feasible (extra 180 kcal, 45 g protein daily)          May consider Swallow evaluation as medically feasible if for po intake trial   [ X ] Nutrition Supplement: Nephrocaps as medically feasible   [ ] Nutrition Support  [ X ] Other: Discussed with RN    Monitoring and Evaluation:   [ ] PO intake [ x ] Tolerance to diet prescription [ x ] weights [ x ] labs[ x ] follow up per protocol  [ ] other:

## 2022-12-07 NOTE — PROGRESS NOTE ADULT - ATTENDING COMMENTS
Seen at bedside. Continue to offload and elevate to reduce swelling and prevent ulceration. No acute intervention planned. Will follow

## 2022-12-08 NOTE — PROGRESS NOTE ADULT - ASSESSMENT
62 year old morbid obese male, from [facility], has past medical hx of ambulatory dysfunction, ACD, bipolar disorder, BPH, CKD, HLD, PVD, COPD not on oxygen, hypothyroidism, RBBB, lymphedema, CHF (last echo 2/22 normal EF, GIDD) was BIBEMS for hypotension 73/37 mmHg and bradycardia (40's). Patient admitted to ICU for shock and acute encephalopathy.     DX:  - Shock septic vs hypovolemia  - LE cellulitis   - Acute encephalopathy   - Sinus bradycardia  - HAGMA   - SHEYLA on CKD  - Anemia   - COPD not on oxygen at home   - HLD   - BPH  - Hypothyroidism   - Mood disorder       =================== Neuro============================  #Acute Encephalopathy; likely in the setting of acidemia, infection, uremia    Lethargic, fighting vent    Pain meds with Tylenol   CT Head negative for acute bleed/swelling  Off sedation  Awakens to voice, but does not follow commands    #Seizure  - Home med includes valproic acid  - Patient is having repeated muscle spasms, improved on Versed but not fully stopped  - Still having muscle movements after dialysis which brought down BUN  - Started propofol instead of precedex  - Valproic acid level low, likely due to missed doses when patient was originally admitted.  - DC keppra per neuro recs, prefer valproate as less nephrotoxic  - spot EEG 12/3 - moderate generalized slowing  - f/u 24 hr EEG (12/4)  - Valproic Acid to 750mg Q6    ================= Cardiovascular==========================  Sinus bradycardia  - EKG sinus bradycardia, old RBBB  - TTE 12/1 - LVEF 60-65%  - trops neg  - Given normal EF, no changes in EKG, cardiogenic shock less likely    Hypotension  - off pressers      ================- Pulm=================================  COPD  - Patient with hx of COPD not in exacerbation   - Chest X ray Showed bilateral patchy infiltrates, no concern for fluid overload    - CXR 12/7 showed severe R Atelectasis, likely due to mucous plugging, chest PT, R side elevated  [ ] Repeat CXR 2PM today  - Low threshold for intubation given poor respiratory status  - Oxygen supplementation as needed to maintain SpO2 of 88-92%      ==================ID===================================  Shock  - Concern for sepsis vs hypovolemic   - Empirical treatment with CTX for LE cellulitis and for pseudomonal coverage  - S/p 4L NS   - Started on pressors   F/u lactate 1.4, procalcitonin, Bcx, Ucx, CK NGTD  - ID consulted Dr Nolasco     ================= Nephro================================  SHEYLA on CKD  - On admission, patient with Cr 6.4>>5.6, baseline 3.7; stable at baseline today  - Worsening CKD most likely a combination of obstructive vs pre- renal   - Viera catheter placed in ED with 1000 cc of UO  - -58mL in the past 24 hours  - Avoid nephrotoxins, NSAIDS, ACEI and ARBS  - Monitor BMP daily  - Nephro Dr Valenzuela consulted  - S/p Dialysis 12/2 and 12/3, Creatnine near baseline, mental status improved  Dialysis held today 12/7    HAGMA  - Most likely from uremia   - Nephro Dr Valenzuela consulted  - Hold dialysis today as BUN/Cr near baseline    Hypernatremia  [ ] D5W 1L  [ ] follow up BMP tonight 8PM    =================GI====================================  NGT in place  Start tube feeds  Resume psych PO medications- Wellbutrin  Swallow evaluation  Bowel Regimen Give tonight,  Possible disimpaction tomorrow    ================ Heme==================================  Anemia   - Anemia of chronic disease   - Monitor CBC daily   - Hgb Stable no signs of active bleed    =================Endocrine===============================  Hypothyroidism  - F/u TSH Normal   - C/w home meds when not NPO     ================= Skin/Catheters============================  Peripheral IV lines   Viera catheter    Sacral and Posterior Thigh Ulcers, Wound Care Consulted  [ ] f/u removal of shiley     =================Prophylaxis =============================  DVT prophylaxis with Heparin SQ daily   GI prophylaxis with PPI daily    ==================GOC==================================  FULL CODE    62 year old morbid obese male, from [facility], has past medical hx of ambulatory dysfunction, ACD, bipolar disorder, BPH, CKD, HLD, PVD, COPD not on oxygen, hypothyroidism, RBBB, lymphedema, CHF (last echo 2/22 normal EF, GIDD) was BIBEMS for hypotension 73/37 mmHg and bradycardia (40's). Patient admitted to ICU for shock and acute encephalopathy.     DX:  - Shock septic vs hypovolemia  - LE cellulitis   - Acute encephalopathy   - Sinus bradycardia  - HAGMA   - SHEYLA on CKD  - Anemia   - COPD not on oxygen at home   - HLD   - BPH  - Hypothyroidism   - Mood disorder       =================== Neuro============================  #Acute Encephalopathy; likely in the setting of acidemia, infection, uremia    Lethargic, fighting vent    Pain meds with Tylenol   CT Head negative for acute bleed/swelling  Off sedation  Awakens to voice, but does not follow commands    #Seizure  - Home med includes valproic acid  - Patient is having repeated muscle spasms, improved on Versed but not fully stopped  - Still having muscle movements after dialysis which brought down BUN  - Started propofol instead of precedex  - Valproic acid level low, likely due to missed doses when patient was originally admitted.  - DC keppra per neuro recs, prefer valproate as less nephrotoxic  - spot EEG 12/3 - moderate generalized slowing  - f/u 24 hr EEG (12/4)  - Valproic Acid to 750mg Q6    ================= Cardiovascular==========================  Sinus bradycardia  - EKG sinus bradycardia, old RBBB  - TTE 12/1 - LVEF 60-65%  - trops neg  - Given normal EF, no changes in EKG, cardiogenic shock less likely    Hypotension  - off pressers      ================- Pulm=================================  COPD  - Patient with hx of COPD not in exacerbation   - Chest X ray Showed bilateral patchy infiltrates, no concern for fluid overload    - CXR 12/7 showed severe R Atelectasis, likely due to mucous plugging, chest PT, R side elevated  -CXR 12/7 3PM showed improvement with chest PT  -CXR 12/8 showed worsening edema, fluids stopped,   - Patient on high flow  - Oxygen supplementation as needed to maintain SpO2 of 88-92%      ==================ID===================================  Shock  - Concern for sepsis vs hypovolemic   - Empirical treatment with CTX for LE cellulitis and for pseudomonal coverage  - S/p 4L NS   - Started on pressors   F/u lactate 1.4, procalcitonin, Bcx, Ucx, CK NGTD  - ID consulted Dr Nolasco     ================= Nephro================================  SHEYLA on CKD  - On admission, patient with Cr 6.4>>5.6, baseline 3.7; stable at baseline today  - Worsening CKD most likely a combination of obstructive vs pre- renal   - Viera catheter placed in ED with 1000 cc of UO  - -58mL in the past 24 hours  - Avoid nephrotoxins, NSAIDS, ACEI and ARBS  - Monitor BMP daily  - Nephro Dr Valenzuela consulted  - S/p Dialysis 12/2 and 12/3, Creatnine near baseline, mental status improved  Dialysis held today 12/8    HAGMA  - Most likely from uremia   - Nephro Dr Valenzuela consulted  - Hold dialysis today as BUN/Cr near baseline    Hypernatremia  Held D5 IV fluids in the setting of worsening edema,   Give 200ml Q6 free water via NGT    =================GI====================================  NGT in place  Start tube feeds  Resume psych PO medications- Wellbutrin  Swallow evaluation  Bowel Regimen Give tonight,  Possible disimpaction tomorrow    ================ Heme==================================  Anemia   - Anemia of chronic disease   - Monitor CBC daily   - Hgb Stable no signs of active bleed    =================Endocrine===============================  Hypothyroidism  - F/u TSH Normal   - C/w home meds when not NPO     ================= Skin/Catheters============================  Peripheral IV lines   Viera catheter    Sacral and Posterior Thigh Ulcers, Wound Care Consulted    =================Prophylaxis =============================  DVT prophylaxis with Heparin SQ daily   GI prophylaxis with PPI daily    ==================GOC==================================  FULL CODE

## 2022-12-08 NOTE — PROGRESS NOTE ADULT - RESPIRATORY
no wheezes/no rales/airway patent/breath sounds equal/good air movement/rhonchi
no wheezes/no rales/breath sounds equal/rhonchi
no wheezes/no rales/breath sounds equal/rhonchi

## 2022-12-08 NOTE — PROGRESS NOTE ADULT - ASSESSMENT
Pneumonia - CAP  fevers - resolved  Leukocytosis - normalized  rash - on legs is dried up      Plan - Cont Rocephin 1 gm iv q24hrs, will give antibiotics for 1-2 days more only.  Time spent - 30 mins

## 2022-12-08 NOTE — PROGRESS NOTE ADULT - SUBJECTIVE AND OBJECTIVE BOX
ICU VISIT  62y Male    Meds:  cefTRIAXone   IVPB 2000 milliGRAM(s) IV Intermittent every 24 hours    Allergies    No Known Allergies    Intolerances        VITALS:  Vital Signs Last 24 Hrs  T(C): 36.6 (08 Dec 2022 15:30), Max: 37.3 (07 Dec 2022 18:00)  T(F): 97.9 (08 Dec 2022 15:30), Max: 99.1 (07 Dec 2022 18:00)  HR: 87 (08 Dec 2022 15:00) (60 - 97)  BP: 116/40 (08 Dec 2022 15:00) (101/42 - 141/59)  BP(mean): 64 (08 Dec 2022 15:00) (59 - 97)  RR: 27 (08 Dec 2022 15:00) (17 - 28)  SpO2: 97% (08 Dec 2022 15:00) (87% - 100%)    Parameters below as of 07 Dec 2022 19:00  Patient On (Oxygen Delivery Method): BiPAP/CPAP        LABS/DIAGNOSTIC TESTS:                          7.2    9.80  )-----------( 149      ( 08 Dec 2022 03:25 )             24.7         12-08    150<H>  |  115<H>  |  64<H>  ----------------------------<  103<H>  3.6   |  30  |  3.58<H>    Ca    9.5      08 Dec 2022 13:30  Phos  4.1     12-08  Mg     2.2     12-08    TPro  6.3  /  Alb  1.9<L>  /  TBili  0.2  /  DBili  x   /  AST  9<L>  /  ALT  12  /  AlkPhos  65  12-08      LIVER FUNCTIONS - ( 08 Dec 2022 03:25 )  Alb: 1.9 g/dL / Pro: 6.3 g/dL / ALK PHOS: 65 U/L / ALT: 12 U/L DA / AST: 9 U/L / GGT: x             CULTURES: ET Tube ET Tube  12-01 @ 17:40   Normal Respiratory Ivelisse present  --    Rare polymorphonuclear leukocytes per low power field  Rare Squamous epithelial cells per low power field  No organisms seen per oil power field      .Blood Blood  12-01 @ 00:15   No Growth Final  --  --      Clean Catch Clean Catch (Midstream)  12-01 @ 00:14   No growth  --  --            RADIOLOGY:< from: Xray Chest 1 View- PORTABLE-Urgent (Xray Chest 1 View- PORTABLE-Urgent .) (12.08.22 @ 10:44) >  ACC: [id number] EXAM:  XR CHEST PORTABLE URGENT 1V                        ACC: [id number] EXAM:  XR CHEST PORTABLE ROUTINE 1V                          PROCEDURE DATE:  12/07/2022          INTERPRETATION:  INDICATION: Respiratory distress    COMPARISON:12/4/2022    Portable chest 12/7/2022 at 9:41 AM    FINDINGS:  Heart/Vascular: The heart size, mediastinum, hilum and aorta are within   normal limits for projection.  Pulmonary: Midline trachea. Complete whiteout of right lung with mild   shift of heart and mediastinum to the right suggesting at least some   degree of atelectasis. The left lung is clear.  Bones: There is no fracture.  Lines and catheter: Feeding tube below diaphragm. Tip appears to be in   the very distal stomach.    Portable chest 12/8/2022 at 9:29 AM. Comparison 12/7/2022 at 3:14 PM    FINDINGS: Further reaeration of the right lung. There is however now   moderate pulmonary venous congestion. The heart size appears stable. Tip   of feeding tube presently not seen.    Impression:    In comparison with 12/7/2022 at 9:41 AM there is near complete resolution   of the right-sided atelectasis. There is now moderate pulmonary venous   congestion.    --- End of Report ---             JONATHAN ESPINAL DO; Attending Radiologist  This document has been electronically signed. Dec  8 2022 12:31PM    < end of copied text >      ROS:  [  ] UNABLE TO ELICIT ICU VISIT  62y Male who remains in the ICU , he is on HFNC on a FIO2 of 80% and states that he does not feel SOB and that he has very little coughing, he has no chest pain , nausea, vomiting or diarrhea , he is c/o wanting to eat and drink but explained to him that he is currently on NGT feeds until he stabilizes more. He has no fevers or chills.    Meds:  cefTRIAXone   IVPB 2000 milliGRAM(s) IV Intermittent every 24 hours    Allergies    No Known Allergies    Intolerances        VITALS:  Vital Signs Last 24 Hrs  T(C): 36.6 (08 Dec 2022 15:30), Max: 37.3 (07 Dec 2022 18:00)  T(F): 97.9 (08 Dec 2022 15:30), Max: 99.1 (07 Dec 2022 18:00)  HR: 87 (08 Dec 2022 15:00) (60 - 97)  BP: 116/40 (08 Dec 2022 15:00) (101/42 - 141/59)  BP(mean): 64 (08 Dec 2022 15:00) (59 - 97)  RR: 27 (08 Dec 2022 15:00) (17 - 28)  SpO2: 97% (08 Dec 2022 15:00) (87% - 100%)    Parameters below as of 07 Dec 2022 19:00  Patient On (Oxygen Delivery Method): BiPAP/CPAP        LABS/DIAGNOSTIC TESTS:                          7.2    9.80  )-----------( 149      ( 08 Dec 2022 03:25 )             24.7         12-08    150<H>  |  115<H>  |  64<H>  ----------------------------<  103<H>  3.6   |  30  |  3.58<H>    Ca    9.5      08 Dec 2022 13:30  Phos  4.1     12-08  Mg     2.2     12-08    TPro  6.3  /  Alb  1.9<L>  /  TBili  0.2  /  DBili  x   /  AST  9<L>  /  ALT  12  /  AlkPhos  65  12-08      LIVER FUNCTIONS - ( 08 Dec 2022 03:25 )  Alb: 1.9 g/dL / Pro: 6.3 g/dL / ALK PHOS: 65 U/L / ALT: 12 U/L DA / AST: 9 U/L / GGT: x             CULTURES: ET Tube ET Tube  12-01 @ 17:40   Normal Respiratory Ivelisse present  --    Rare polymorphonuclear leukocytes per low power field  Rare Squamous epithelial cells per low power field  No organisms seen per oil power field      .Blood Blood  12-01 @ 00:15   No Growth Final  --  --      Clean Catch Clean Catch (Midstream)  12-01 @ 00:14   No growth  --  --            RADIOLOGY:< from: Xray Chest 1 View- PORTABLE-Urgent (Xray Chest 1 View- PORTABLE-Urgent .) (12.08.22 @ 10:44) >  ACC: [id number] EXAM:  XR CHEST PORTABLE URGENT 1V                        ACC: [id number] EXAM:  XR CHEST PORTABLE ROUTINE 1V                          PROCEDURE DATE:  12/07/2022          INTERPRETATION:  INDICATION: Respiratory distress    COMPARISON:12/4/2022    Portable chest 12/7/2022 at 9:41 AM    FINDINGS:  Heart/Vascular: The heart size, mediastinum, hilum and aorta are within   normal limits for projection.  Pulmonary: Midline trachea. Complete whiteout of right lung with mild   shift of heart and mediastinum to the right suggesting at least some   degree of atelectasis. The left lung is clear.  Bones: There is no fracture.  Lines and catheter: Feeding tube below diaphragm. Tip appears to be in   the very distal stomach.    Portable chest 12/8/2022 at 9:29 AM. Comparison 12/7/2022 at 3:14 PM    FINDINGS: Further reaeration of the right lung. There is however now   moderate pulmonary venous congestion. The heart size appears stable. Tip   of feeding tube presently not seen.    Impression:    In comparison with 12/7/2022 at 9:41 AM there is near complete resolution   of the right-sided atelectasis. There is now moderate pulmonary venous   congestion.    --- End of Report ---             JONATHAN ESPINAL DO; Attending Radiologist  This document has been electronically signed. Dec  8 2022 12:31PM    < end of copied text >      ROS:  [  ] UNABLE TO ELICIT

## 2022-12-08 NOTE — PROGRESS NOTE ADULT - SKIN COMMENTS
rash over legs is dry and scaly
chronic rash on legs , pustular lesions have resolved, no warmth of skin
scaly dry rash over lower exts unchanged

## 2022-12-08 NOTE — PROGRESS NOTE ADULT - SUBJECTIVE AND OBJECTIVE BOX
INTERVAL HPI/OVERNIGHT EVENTS:     PRESSORS: [ ] YES [ ] NO  WHICH:    ANTIBIOTICS:                  DATE STARTED:  ANTIBIOTICS:                  DATE STARTED:  ANTIBIOTICS:                  DATE STARTED:    Antimicrobial:  cefTRIAXone   IVPB 2000 milliGRAM(s) IV Intermittent every 24 hours    Cardiovascular:  doxazosin 1 milliGRAM(s) Oral at bedtime    Pulmonary:  albuterol/ipratropium for Nebulization 3 milliLiter(s) Nebulizer every 6 hours    Hematalogic:  heparin   Injectable 5000 Unit(s) SubCutaneous every 8 hours    Other:  chlorhexidine 2% Cloths 1 Application(s) Topical <User Schedule>  collagenase Ointment 1 Application(s) Topical two times a day  dextrose 5%. 1000 milliLiter(s) IV Continuous <Continuous>  levothyroxine 125 MICROGram(s) Oral daily  pantoprazole  Injectable 40 milliGRAM(s) IV Push daily  sevelamer carbonate Powder 800 milliGRAM(s) Enteral Tube three times a day  valproate sodium  IVPB 750 milliGRAM(s) IV Intermittent every 6 hours  zinc oxide 20% Ointment 1 Application(s) Topical two times a day      Drug Dosing Weight  Height (cm): 177.8 (30 Nov 2022 18:45)  Weight (kg): 113.9 (30 Nov 2022 18:45)  BMI (kg/m2): 36 (30 Nov 2022 18:45)  BSA (m2): 2.3 (30 Nov 2022 18:45)    CENTRAL LINE: [ ] YES [ ] NO  LOCATION:   DATE INSERTED:  REMOVE: [ ] YES [ ] NO  EXPLAIN:    HATFIELD: [ ] YES [ ] NO    DATE INSERTED:  REMOVE:  [ ] YES [ ] NO  EXPLAIN:    A-LINE:  [ ] YES [ ] NO  LOCATION:   DATE INSERTED:  REMOVE:  [ ] YES [ ] NO  EXPLAIN:    PMH/Social Hx/Fam Hx -reviewed admission note, no change since admission  PAST MEDICAL & SURGICAL HISTORY:  Hypothyroid      Hyperlipidemia      Ambulatory dysfunction      Anemia      History of BPH      CKD (chronic kidney disease)      Chronic obstructive pulmonary disease (COPD)      Bipolar disorder      HLD (hyperlipidemia)      BPH (benign prostatic hyperplasia)      Chronic diastolic congestive heart failure      Lymphedema      No significant past surgical history        Heart faliure: acute [ ] chronic [ ] acute or chronic [ ] diastolic [ ] systolic [ ] combied systolic and diastolic[ ]  SHEYLA: ATN[ ] renal medullary necrosis [ ] CKD I [ ]CKDII [ ]CKD III [ ]CKD IV [ ]CKD V [ ]Other pathological lesions [ ]  Abdominal Nutrition Status: malnutrition [ ] cachexia [ ] morbid obesity/BMI=40 [ ] Supplement ordered [___________]     T(C): 36.7 (12-08-22 @ 04:00), Max: 37.3 (12-07-22 @ 13:00)  HR: 70 (12-08-22 @ 06:00)  BP: 134/50 (12-08-22 @ 06:00)  BP(mean): 75 (12-08-22 @ 06:00)  ABP: --  ABP(mean): --  RR: 20 (12-08-22 @ 06:00)  SpO2: 100% (12-08-22 @ 06:00)  Wt(kg): --    ABG - ( 08 Dec 2022 03:32 )  pH, Arterial: 7.33  pH, Blood: x     /  pCO2: 55    /  pO2: 190   / HCO3: 29    / Base Excess: 1.9   /  SaO2: 99                    12-07 @ 07:01  -  12-08 @ 07:00  --------------------------------------------------------  IN: 2850 mL / OUT: 2755 mL / NET: 95 mL            PHYSICAL EXAM:    GENERAL: [ ]NAD, [ ]well-groomed, [ ]well-developed  HEAD:  [ ]Atraumatic, [ ]Normocephalic  EYES: [ ]EOMI, [ ]PERRLA, [ ]conjunctiva and sclera clear  ENMT: [ ]No tonsillar erythema, exudates, or enlargement; [ ]Moist mucous membranes, [ ]Good dentition, [ ]No lesions  NECK: [ ]Supple, normal appearance, [ ]No JVD; [ ]Normal thyroid; [ ]Trachea midline  NERVOUS SYSTEM:  [ ]Alert & Oriented X3, [ ]Good concentration; [ ]Motor Strength 5/5 B/L upper and lower extremities; [ ]DTRs 2+ intact and symmetric  CHEST/LUNG: [ ]No chest deformity; [ ]Normal percussion bilaterally; [ ]No rales, rhonchi, wheezing; [ ]Crackles at bases  HEART: [ ]Regular rate and rhythm; [ ]No murmurs, rubs, or gallops  ABDOMEN: [ ]Soft, Nontender, Nondistended; [ ]Bowel sounds present  EXTREMITIES:  [ ]2+ Peripheral Pulses, [ ]No clubbing, cyanosis, or edema [ ]Bilat lower extremity edema  LYMPH: [ ]No lymphadenopathy noted  SKIN: [ ]No rashes or lesions; [ ]Good capillary refill      LABS:  CBC Full  -  ( 08 Dec 2022 03:25 )  WBC Count : 9.80 K/uL  RBC Count : 2.39 M/uL  Hemoglobin : 7.2 g/dL  Hematocrit : 24.7 %  Platelet Count - Automated : 149 K/uL  Mean Cell Volume : 103.3 fl  Mean Cell Hemoglobin : 30.1 pg  Mean Cell Hemoglobin Concentration : 29.1 gm/dL  Auto Neutrophil # : x  Auto Lymphocyte # : x  Auto Monocyte # : x  Auto Eosinophil # : x  Auto Basophil # : x  Auto Neutrophil % : x  Auto Lymphocyte % : x  Auto Monocyte % : x  Auto Eosinophil % : x  Auto Basophil % : x    12-08    153<H>  |  116<H>  |  66<H>  ----------------------------<  105<H>  3.9   |  31  |  3.67<H>    Ca    9.4      08 Dec 2022 03:25  Phos  4.1     12-08  Mg     2.2     12-08    TPro  6.3  /  Alb  1.9<L>  /  TBili  0.2  /  DBili  x   /  AST  9<L>  /  ALT  12  /  AlkPhos  65  12-08            RADIOLOGY & ADDITIONAL STUDIES REVIEWED:      [ ]GOALS OF CARE DISCUSSION WITH PATIENT/FAMILY/PROXY:    CRITICAL CARE TIME SPENT: 35 minutes INTERVAL HPI/OVERNIGHT EVENTS: on BIPAP, titrated down to 50% FiO2, to try HFNC today:    Antimicrobial:  cefTRIAXone   IVPB 2000 milliGRAM(s) IV Intermittent every 24 hours    Cardiovascular:  doxazosin 1 milliGRAM(s) Oral at bedtime    Pulmonary:  albuterol/ipratropium for Nebulization 3 milliLiter(s) Nebulizer every 6 hours    Hematalogic:  heparin   Injectable 5000 Unit(s) SubCutaneous every 8 hours    Other:  chlorhexidine 2% Cloths 1 Application(s) Topical <User Schedule>  collagenase Ointment 1 Application(s) Topical two times a day  dextrose 5%. 1000 milliLiter(s) IV Continuous <Continuous>  levothyroxine 125 MICROGram(s) Oral daily  pantoprazole  Injectable 40 milliGRAM(s) IV Push daily  sevelamer carbonate Powder 800 milliGRAM(s) Enteral Tube three times a day  valproate sodium  IVPB 750 milliGRAM(s) IV Intermittent every 6 hours  zinc oxide 20% Ointment 1 Application(s) Topical two times a day      Drug Dosing Weight  Height (cm): 177.8 (30 Nov 2022 18:45)  Weight (kg): 113.9 (30 Nov 2022 18:45)  BMI (kg/m2): 36 (30 Nov 2022 18:45)  BSA (m2): 2.3 (30 Nov 2022 18:45)    PMH/Social Hx/Fam Hx -reviewed admission note, no change since admission  PAST MEDICAL & SURGICAL HISTORY:  Hypothyroid      Hyperlipidemia      Ambulatory dysfunction      Anemia      History of BPH      CKD (chronic kidney disease)      Chronic obstructive pulmonary disease (COPD)      Bipolar disorder      HLD (hyperlipidemia)      BPH (benign prostatic hyperplasia)      Chronic diastolic congestive heart failure      Lymphedema      No significant past surgical history        Heart faliure: acute [ ] chronic [ ] acute or chronic [ ] diastolic [ ] systolic [ ] combied systolic and diastolic[ ]  SHEYLA: ATN[ ] renal medullary necrosis [ ] CKD I [ ]CKDII [ ]CKD III [ ]CKD IV [ ]CKD V [ ]Other pathological lesions [ ]  Abdominal Nutrition Status: malnutrition [ ] cachexia [ ] morbid obesity/BMI=40 [ ] Supplement ordered [___________]     T(C): 36.7 (12-08-22 @ 04:00), Max: 37.3 (12-07-22 @ 13:00)  HR: 70 (12-08-22 @ 06:00)  BP: 134/50 (12-08-22 @ 06:00)  BP(mean): 75 (12-08-22 @ 06:00)  ABP: --  ABP(mean): --  RR: 20 (12-08-22 @ 06:00)  SpO2: 100% (12-08-22 @ 06:00)  Wt(kg): --    ABG - ( 08 Dec 2022 03:32 )  pH, Arterial: 7.33  pH, Blood: x     /  pCO2: 55    /  pO2: 190   / HCO3: 29    / Base Excess: 1.9   /  SaO2: 99                    12-07 @ 07:01  -  12-08 @ 07:00  --------------------------------------------------------  IN: 2850 mL / OUT: 2755 mL / NET: 95 mL            PHYSICAL EXAM:    GENERAL: NAD, sleeping on bipap mask  HEAD:  Atraumatic, Normocephalic  EYES: EOMI, PERRLA, conjunctiva and sclera clear  NECK: Supple, normal appearance  NERVOUS SYSTEM:  Awakens to voice, does not follow commands  CHEST/LUNG: Lungs clear to auscultation bilaterally in upper lobes, sounds diminished  HEART: Regular rate and rhythm; No murmurs, rubs, or gallops  ABDOMEN: Soft, Nontender, Nondistended; Bowel sounds present  EXTREMITIES:  bilateral edema still present, stable          LABS:  CBC Full  -  ( 08 Dec 2022 03:25 )  WBC Count : 9.80 K/uL  RBC Count : 2.39 M/uL  Hemoglobin : 7.2 g/dL  Hematocrit : 24.7 %  Platelet Count - Automated : 149 K/uL  Mean Cell Volume : 103.3 fl  Mean Cell Hemoglobin : 30.1 pg  Mean Cell Hemoglobin Concentration : 29.1 gm/dL  Auto Neutrophil # : x  Auto Lymphocyte # : x  Auto Monocyte # : x  Auto Eosinophil # : x  Auto Basophil # : x  Auto Neutrophil % : x  Auto Lymphocyte % : x  Auto Monocyte % : x  Auto Eosinophil % : x  Auto Basophil % : x    12-08    153<H>  |  116<H>  |  66<H>  ----------------------------<  105<H>  3.9   |  31  |  3.67<H>    Ca    9.4      08 Dec 2022 03:25  Phos  4.1     12-08  Mg     2.2     12-08    TPro  6.3  /  Alb  1.9<L>  /  TBili  0.2  /  DBili  x   /  AST  9<L>  /  ALT  12  /  AlkPhos  65  12-08            RADIOLOGY & ADDITIONAL STUDIES REVIEWED:      [ ]GOALS OF CARE DISCUSSION WITH PATIENT/FAMILY/PROXY:    CRITICAL CARE TIME SPENT: 35 minutes

## 2022-12-08 NOTE — PROGRESS NOTE ADULT - SUBJECTIVE AND OBJECTIVE BOX
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 jerks, and less likely to represent epileptic seizures given absence of seizures on continuous EEG      RECOMMENDATIONS:    - Continue Valproate 750mg IV QID for now - Transition back to Valproic acid syrup 750mg PO QID when able to be tolerated by patient now that BiPAP has been removed this morning    - Levetiracetam 500mg PT/IV BID does not need to be resumed since myoclonic jerks are well-controlled on Valproic acid    - Respiratory management and infectious workup as per primary team    - DVT ppx: SCDs, Heparin          NOTE TO BE COMPLETED - PLEASE REFER TO ABOVE ONLY AND IGNORE INFORMATION BELOW    ******************************    HPI:  62 year old morbid obese male, from [facility], has past medical hx of ambulatory dysfunction, ACD, bipolar disorder, BPH, CKD, HLD, PVD,  COPD not on oxygen, hypothyroidism, RBBB, lymphedema, CHF (last echo 2/22 normal EF, GIDD) was BIBEMS for hypotension 73/37 mmHg and bradycardia (40's). Story obtained from ED provider note as patient is not able to provide any history. Patient received 500 cc NS and one dose of atropine for bradycardia. Patient very somnolent during interview but arousable to verbal stimuli.  (30 Nov 2022 18:39)      NEURO HPI:      INTERVAL HISTORY:      MEDICATIONS:  albuterol/ipratropium for Nebulization 3 milliLiter(s) Nebulizer every 6 hours  cefTRIAXone   IVPB 2000 milliGRAM(s) IV Intermittent every 24 hours  chlorhexidine 2% Cloths 1 Application(s) Topical <User Schedule>  collagenase Ointment 1 Application(s) Topical two times a day  doxazosin 1 milliGRAM(s) Oral at bedtime  heparin   Injectable 5000 Unit(s) SubCutaneous every 8 hours  levothyroxine 125 MICROGram(s) Oral daily  pantoprazole  Injectable 40 milliGRAM(s) IV Push daily  sevelamer carbonate Powder 800 milliGRAM(s) Enteral Tube three times a day  valproate sodium  IVPB 750 milliGRAM(s) IV Intermittent every 6 hours  zinc oxide 20% Ointment 1 Application(s) Topical two times a day      ALLERGIES:  No Known Allergies      REVIEW OF SYSTEMS:  Fourteen systems reviewed and negative except as in HPI / Interval History.        OBJECTIVE:  Vital Signs Last 24 Hrs  T(C): 36.6 (08 Dec 2022 08:00), Max: 37.3 (07 Dec 2022 13:00)  T(F): 97.8 (08 Dec 2022 08:00), Max: 99.1 (07 Dec 2022 13:00)  HR: 61 (08 Dec 2022 09:00) (60 - 106)  BP: 112/41 (08 Dec 2022 09:00) (102/44 - 157/68)  BP(mean): 62 (08 Dec 2022 09:00) (62 - 97)  RR: 19 (08 Dec 2022 09:00) (17 - 28)  SpO2: 99% (08 Dec 2022 09:00) (95% - 100%)    Parameters below as of 07 Dec 2022 19:00  Patient On (Oxygen Delivery Method): BiPAP/CPAP        General Examination:  General: No acute distress  HEENT: Atraumatic, Normocephalic  Respiratory: CTA B/l.  No crackles, rhonchi, or wheezes.  Cardiovascular: RRR.  Normal S1 & S2.  Normal b/l radial and pedal pulses.    Neurological Examination:  General / Mental Status: AAO x 3.  No aphasia or dysarthria.  Naming and repetition intact.  Cranial Nerves: VFF x 4.  PERRL.  EOMI x 2, No nystagmus or diplopia.  B/l V1-V3 equal and intact to light touch and pinprick.  Symmetric facial movement and palate elevation.  B/l hearing equal to finger rub.  5/5 strength with b/l sternocleidomastoid & trapezius.  Midline tongue protrusion, with no atrophy or fasciculations.  Motor: Normal bulk & tone in all four extremities.  5/5 strength throughout all four extremities.  No downward drift, rigidity, spasticity, or tremors in any of the four extremities.  Sensory: Intact to light touch and pinprick in all four extremities.  Negative Romberg.  Reflex: 2+ and symmetric at b/l biceps, triceps, brachioradialis, patellae, and ankles.  Downgoing toes b/l.  Coordination: No dysmetria with b/l finger-to-nose and heel raise tests.  Symmetric rapid alternating movements b/l.  Gait: Normal, narrow-based gait.  No difficulty with tiptoe, heel, and tandem gaits.        LABORATORY VALUES:                          7.2    9.80  )-----------( 149      ( 08 Dec 2022 03:25 )             24.7       12-08    153<H>  |  116<H>  |  66<H>  ----------------------------<  105<H>  3.9   |  31  |  3.67<H>    Ca    9.4      08 Dec 2022 03:25  Phos  4.1     12-08  Mg     2.2     12-08    TPro  6.3  /  Alb  1.9<L>  /  TBili  0.2  /  DBili  x   /  AST  9<L>  /  ALT  12  /  AlkPhos  65  12-08      LIVER FUNCTIONS - ( 08 Dec 2022 03:25 )  Alb: 1.9 g/dL / Pro: 6.3 g/dL / ALK PHOS: 65 U/L / ALT: 12 U/L DA / AST: 9 U/L / GGT: x                 Glucose Trend  12-08-22 @ 05:37   -  -- -- 101<H>  12-08-22 @ 03:25   -  -- 105<H> --  12-07-22 @ 21:44   -  -- -- 105<H>  12-07-22 @ 21:30   -  -- 116<H> --  12-07-22 @ 17:12   -  -- -- 104<H>  12-07-22 @ 11:10   -  -- -- 100<H>  12-07-22 @ 05:34   -  -- -- 112<H>  12-07-22 @ 03:19   -  -- 99 --  12-06-22 @ 23:15   -  -- -- 98  12-06-22 @ 16:33   -  -- -- 90                    NEUROIMAGING:          Please contact the Neurology consult service with any neurological questions.      Eric Rose MD   of Neurology  [facility] at [facility]         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 jerks, and less likely to represent epileptic seizures given absence of seizures on continuous EEG      RECOMMENDATIONS:    - Continue Valproate 750mg IV QID for now - Transition back to Valproic acid syrup 750mg PO QID when able to be tolerated by patient now that BiPAP has been removed this morning    - Levetiracetam 500mg PT/IV BID does not need to be resumed since myoclonic jerks are well-controlled on Valproic acid    - Respiratory management and infectious workup as per primary team    - DVT ppx: SCDs, Heparin          ******************************    HPI:  62 year old morbid obese male, from [facility], has past medical hx of ambulatory dysfunction, ACD, bipolar disorder, BPH, CKD, HLD, PVD,  COPD not on oxygen, hypothyroidism, RBBB, lymphedema, CHF (last echo 2/22 normal EF, GIDD) was BIBEMS for hypotension 73/37 mmHg and bradycardia (40's). Story obtained from ED provider note as patient is not able to provide any history. Patient received 500 cc NS and one dose of atropine for bradycardia. Patient very somnolent during interview but arousable to verbal stimuli.  (30 Nov 2022 18:39)      NEURO HPI:  62 LHM presenting after experiencing bradycardia and hypotension, and then observed to have shaking of both arms and hands while being managed in the ICU.      INTERVAL HISTORY:  The patient continues to be managed on BiPAP, without any witnessed arm shaking episodes overnight.      MEDICATIONS:  albuterol/ipratropium for Nebulization 3 milliLiter(s) Nebulizer every 6 hours  cefTRIAXone   IVPB 2000 milliGRAM(s) IV Intermittent every 24 hours  chlorhexidine 2% Cloths 1 Application(s) Topical <User Schedule>  collagenase Ointment 1 Application(s) Topical two times a day  doxazosin 1 milliGRAM(s) Oral at bedtime  heparin   Injectable 5000 Unit(s) SubCutaneous every 8 hours  levothyroxine 125 MICROGram(s) Oral daily  pantoprazole  Injectable 40 milliGRAM(s) IV Push daily  sevelamer carbonate Powder 800 milliGRAM(s) Enteral Tube three times a day  valproate sodium  IVPB 750 milliGRAM(s) IV Intermittent every 6 hours  zinc oxide 20% Ointment 1 Application(s) Topical two times a day      ALLERGIES:  No Known Allergies      REVIEW OF SYSTEMS:  Fourteen systems reviewed and negative except as in HPI / Interval History.        OBJECTIVE:  Vital Signs Last 24 Hrs  T(C): 36.6 (08 Dec 2022 08:00), Max: 37.3 (07 Dec 2022 13:00)  T(F): 97.8 (08 Dec 2022 08:00), Max: 99.1 (07 Dec 2022 13:00)  HR: 61 (08 Dec 2022 09:00) (60 - 106)  BP: 112/41 (08 Dec 2022 09:00) (102/44 - 157/68)  BP(mean): 62 (08 Dec 2022 09:00) (62 - 97)  RR: 19 (08 Dec 2022 09:00) (17 - 28)  SpO2: 99% (08 Dec 2022 09:00) (95% - 100%)  Parameters below as of 07 Dec 2022 19:00  Patient On (Oxygen Delivery Method): BiPAP/CPAP      General Examination:  General: No acute distress  HEENT: Atraumatic, Normocephalic  Respiratory: Diminished breath sounds b/l  Cardiovascular: RRR.  Normal S1 & S2.  Normal b/l radial and pedal pulses.    Neurological Examination:  General / Mental Status: AAO x 1 (only to name).  No aphasia or dysarthria.  Cranial Nerves: B/l blink to threat present.  PERRL.  EOMI x 2, No nystagmus.  B/l V1-V3 equal and intact to light touch and pinprick.  Symmetric facial movement and palate elevation.  B/l hearing equal to finger rub.  At least 3/5 strength with b/l sternocleidomastoid & trapezius.  Midline tongue protrusion.  Motor: Normal bulk & tone in all four extremities.  At least 3/5 strength throughout all four extremities, limited by poor effort.  Downward drift to bed present in b/l lower extremities; no downward drift present in b/l upper extremities. No rigidity, spasticity, or tremors in any of the four extremities.  Sensory: Intact to light touch and pinprick in all four extremities.  Reflex: 1+ and symmetric at b/l biceps, triceps, brachioradialis, patellae, and ankles.  Downgoing toes b/l.  Coordination: No dysmetria with b/l finger-to-nose and heel raise tests.  Gait and Romberg sign testing deferred while patient is on BiPAP.          LABORATORY VALUES:                          7.2    9.80  )-----------( 149      ( 08 Dec 2022 03:25 )             24.7       12-08    153<H>  |  116<H>  |  66<H>  ----------------------------<  105<H>  3.9   |  31  |  3.67<H>    Ca    9.4      08 Dec 2022 03:25  Phos  4.1     12-08  Mg     2.2     12-08    TPro  6.3  /  Alb  1.9<L>  /  TBili  0.2  /  DBili  x   /  AST  9<L>  /  ALT  12  /  AlkPhos  65  12-08    Glucose Trend  12-08-22 @ 05:37   -  -- -- 101<H>  12-08-22 @ 03:25   -  -- 105<H> --  12-07-22 @ 21:44   -  -- -- 105<H>  12-07-22 @ 21:30   -  -- 116<H> --  12-07-22 @ 17:12   -  -- -- 104<H>  12-07-22 @ 11:10   -  -- -- 100<H>  12-07-22 @ 05:34   -  -- -- 112<H>  12-07-22 @ 03:19   -  -- 99 --  12-06-22 @ 23:15   -  -- -- 98  12-06-22 @ 16:33   -  -- -- 90            NEUROIMAGING:      CT Head (12/1/22):  - No acute intracranial abnormality  - Chronic microvascular changes  - Mild diffuse atrophy  - Incidental inflammatory changes in sinuses      Routine EEG (12/3/22):  - Moderate generalized background slowing  - No seizure tendency identified      Repeat CT Head (12/4/22):  - No acute intracranial abnormality  - Mild chronic microvascular changes  - Mild diffuse atrophy      Routine & Continuous EEG (12/4/22 - 12/5/22):  - Mild/Moderate generalized background slowing  - No seizure tendency identified              Please contact the Neurology consult service with any neurological questions.      Eric Rose MD   of Neurology  [facility] at [facility]

## 2022-12-08 NOTE — PROGRESS NOTE ADULT - SUBJECTIVE AND OBJECTIVE BOX
NEPHROLOGY MEDICAL CARE, [facility] - Dr. Taj Valenzuela/ Dr. Hazel Solis/ Dr. Cosmo Root/ Dr. Fang Gifford    Patient was seen and examined at bedside.    CC: patient is more awake and on HFNC    Vital Signs Last 24 Hrs  T(C): 36.6 (08 Dec 2022 08:00), Max: 37.3 (07 Dec 2022 18:00)  T(F): 97.8 (08 Dec 2022 08:00), Max: 99.1 (07 Dec 2022 18:00)  HR: 87 (08 Dec 2022 12:06) (60 - 106)  BP: 112/41 (08 Dec 2022 09:00) (102/44 - 157/68)  BP(mean): 62 (08 Dec 2022 09:00) (62 - 97)  RR: 19 (08 Dec 2022 09:00) (17 - 28)  SpO2: 97% (08 Dec 2022 12:06) (96% - 100%)    Parameters below as of 07 Dec 2022 19:00  Patient On (Oxygen Delivery Method): BiPAP/CPAP        12-07 @ 07:01  -  12-08 @ 07:00  --------------------------------------------------------  IN: 2850 mL / OUT: 2755 mL / NET: 95 mL        PHYSICAL EXAM:  General: NAD on HFNC  Eyes: conjunctiva and sclera clear  ENMT: Atraumatic, Normocephalic; NGT  Respiratory: Bilateral poor air entry  Cardiovascular: S1S2+; no m/r/g  Gastrointestinal: Soft, Non-tender, Nondistended; Bowel sounds present,   : daley's cath with yellowish urine.  Neuro:  awake and asking for water.  Ext:  vascular skin changes of both legs; No Cyanosis  Skin: No visible rashes  Dialysis Access: Rt femoral shiley    MEDICATIONS:  MEDICATIONS  (STANDING):  albuterol/ipratropium for Nebulization 3 milliLiter(s) Nebulizer every 6 hours  cefTRIAXone   IVPB 2000 milliGRAM(s) IV Intermittent every 24 hours  chlorhexidine 2% Cloths 1 Application(s) Topical <User Schedule>  collagenase Ointment 1 Application(s) Topical two times a day  doxazosin 1 milliGRAM(s) Oral at bedtime  heparin   Injectable 5000 Unit(s) SubCutaneous every 8 hours  levothyroxine 125 MICROGram(s) Oral daily  pantoprazole  Injectable 40 milliGRAM(s) IV Push daily  sevelamer carbonate Powder 800 milliGRAM(s) Enteral Tube three times a day  valproate sodium  IVPB 750 milliGRAM(s) IV Intermittent every 6 hours  zinc oxide 20% Ointment 1 Application(s) Topical two times a day    MEDICATIONS  (PRN):          LABS:                        7.2    9.80  )-----------( 149      ( 08 Dec 2022 03:25 )             24.7     12-08    153<H>  |  116<H>  |  66<H>  ----------------------------<  105<H>  3.9   |  31  |  3.67<H>    Ca    9.4      08 Dec 2022 03:25  Phos  4.1     12-08  Mg     2.2     12-08    TPro  6.3  /  Alb  1.9<L>  /  TBili  0.2  /  DBili  x   /  AST  9<L>  /  ALT  12  /  AlkPhos  65  12-08        Magnesium, Serum: 2.2 mg/dL (12-08 @ 03:25)  Phosphorus Level, Serum: 4.1 mg/dL (12-08 @ 03:25)    Urine studies    PTH and Vit D:

## 2022-12-08 NOTE — PROGRESS NOTE ADULT - ATTENDING COMMENTS
62 year old morbid obese male, from [facility], has past medical hx of ambulatory dysfunction, ACD, bipolar disorder, BPH, CKD, HLD, PVD, COPD not on oxygen, hypothyroidism, RBBB, lymphedema, CHF (last echo 2/22 normal EF, GIDD). Presented for hypotension 73/37 mmHg and bradycardia (40's). Patient admitted to ICU for shock and acute encephalopathy. Now intubated on mechanical ventilation.     DX:  - Acute hypoxic resp failure   - Shock - septic vs hypovolemia  - Bilateral lower cellulitis   - Acute encephalopathy   - Sinus bradycardia  - HAGMA   - SHEYLA on CKD  - Anemia   - COPD not on oxygen at home   - HLD   - BPH  - Hypothyroidism   - Mood disorder       Plan:  - No further myoclonic activity this morning   - Off  Keppra , more awake   - EEG with out seizures  - Depressed mental status maybe due to meds ( Keppra) toxic metabolic   - Neurology consult noted, recommend prolonged EEG monitoring  - CT head unremarkable   - Monitor respiratory status   - CXR improved , Right atelectasis improved . trial of O2 via  HFNC   - Rotated pat right side up with aggressive Chest PT and suctioning   - Completed course of antibx   - CT scan showing basilar pneumonia   - ID following   - Cultures negative   - Monitor renal function  - HD as per Neph  - Viera for urinary obstruction   - Tube feedings via NGT   - Cont. home psych medications  - Cont. Levothyroxine  - DVT and stress ulcer prophylaxis.

## 2022-12-08 NOTE — CHART NOTE - NSCHARTNOTEFT_GEN_A_CORE
Received phone call from Mr. Germán Rhoades ([phone]).  Provided updates and answered all questions.

## 2022-12-09 NOTE — SWALLOW BEDSIDE ASSESSMENT ADULT - COMMENTS
Pt seen for subjective bedside swallow evaluation. Pt received HOB elevated to 90°. Oxygen saturation observed in mid 90's + Hi-Flow NC. AA+Ox3, followed directives & responded to SLP queries, cooperative for all PO trials. Pt denied any pain/difficulty swallowing. PO trials included: ice, puree, minced & moist, mildly thick liquids, & thin liquids. Pt observed to have wet, upper airway sounds following PO intake of minced & moist. Overt s&s of aspiration/penetration observed AEB immediate cough & increased work of breathing following PO intake of thin liquids by cup.

## 2022-12-09 NOTE — PROGRESS NOTE ADULT - SUBJECTIVE AND OBJECTIVE BOX
NEUROLOGY CONSULT NOTE    NAME:  GAYLA PEARCE      ASSESSMENT:  62M with recurrent jerks in the setting of end-stage renal disease on hemodialysis and shock, likely exacerbation of essential tremor vs. myoclonic jerks, now resolved on Valproic acid      RECOMMENDATIONS:    - Okay to decrease Valproate 750mg IV from QID to BID       - Switch to Depakote ER 750mg PO BID when patient can tolerate PO medications    - Respiratory management and infectious workup as per primary team    - DVT ppx: SCDs, Heparin          NOTE TO BE COMPLETED - PLEASE REFER TO ABOVE ONLY AND IGNORE INFORMATION BELOW    *******************************      CHIEF COMPLAINT:  Patient is a 62y old  Male who presents with a chief complaint of Shock, Acute Encephalopathy (09 Dec 2022 15:09)      HPI:  62 year old morbid obese male, from [facility], has past medical hx of ambulatory dysfunction, ACD, bipolar disorder, BPH, CKD, HLD, PVD,  COPD not on oxygen, hypothyroidism, RBBB, lymphedema, CHF (last echo 2/22 normal EF, GIDD) was BIBEMS for hypotension 73/37 mmHg and bradycardia (40's). Story obtained from ED provider note as patient is not able to provide any history. Patient received 500 cc NS and one dose of atropine for bradycardia. Patient very somnolent during interview but arousable to verbal stimuli.  (30 Nov 2022 18:39)      NEURO HPI:      PAST MEDICAL & SURGICAL HISTORY:  Hypothyroid      Hyperlipidemia      Ambulatory dysfunction      Anemia      History of BPH      CKD (chronic kidney disease)      Chronic obstructive pulmonary disease (COPD)      Bipolar disorder      HLD (hyperlipidemia)      BPH (benign prostatic hyperplasia)      Chronic diastolic congestive heart failure      Lymphedema      No significant past surgical history          MEDICATIONS:  albuterol/ipratropium for Nebulization 3 milliLiter(s) Nebulizer every 6 hours  chlorhexidine 2% Cloths 1 Application(s) Topical <User Schedule>  collagenase Ointment 1 Application(s) Topical two times a day  doxazosin 1 milliGRAM(s) Oral at bedtime  heparin   Injectable 5000 Unit(s) SubCutaneous every 8 hours  levothyroxine 125 MICROGram(s) Oral daily  pantoprazole  Injectable 40 milliGRAM(s) IV Push daily  sevelamer carbonate Powder 800 milliGRAM(s) Enteral Tube three times a day  valproate sodium  IVPB 750 milliGRAM(s) IV Intermittent two times a day  zinc oxide 20% Ointment 1 Application(s) Topical two times a day      ALLERGIES:  No Known Allergies      FAMILY HISTORY:        SOCIAL HISTORY:  Denies alcohol, tobacco, or illicit drug use      REVIEW OF SYSTEMS:  GENERAL: No fever, weight changes, fatigue  EYES: No eye pain or discharge  EAR/NOSE/MOUTH/THROAT: No sinus or throat pain; No difficulty hearing  NECK: No pain or stiffness  RESPIRATORY: No cough, wheezing, chills, or hemoptysis  CARDIOVASCULAR: No chest pain, palpitations, shortness of breath, or dyspnea on exertion  GASTROINTESTINAL: No abdominal pain, nausea, vomiting, hematemesis, diarrhea, or constipation  GENITOURINARY: No dysuria, frequency, hematuria, or incontinence  SKIN: No rashes or lesions  ENDOCRINE: No heat or cold intolerance  HEMATOLOGIC: No easy bruising or bleeding  PSYCHIATRIC: No depression, anxiety, or mood swings  MUSCULOSKELETAL: No joint pain or swelling  NEUROLOGICAL: As per HPI        OBJECTIVE:    Vital Signs Last 24 Hrs  T(C): 36.8 (09 Dec 2022 20:00), Max: 37.3 (09 Dec 2022 04:00)  T(F): 98.3 (09 Dec 2022 20:00), Max: 99.1 (09 Dec 2022 04:00)  HR: 81 (09 Dec 2022 20:00) (76 - 99)  BP: 137/48 (09 Dec 2022 20:00) (119/46 - 157/62)  BP(mean): 71 (09 Dec 2022 20:00) (68 - 98)  RR: 21 (09 Dec 2022 20:47) (17 - 34)  SpO2: 94% (09 Dec 2022 20:47) (91% - 99%)    Parameters below as of 09 Dec 2022 20:47  Patient On (Oxygen Delivery Method): nasal cannula, high flow  O2 Flow (L/min): 40  O2 Concentration (%): 40    General Examination:  General: No acute distress  HEENT: Atraumatic, Normocephalic  Respiratory: CTA B/l.  No crackles, rhonchi, or wheezes.  Cardiovascular: RRR.  Normal S1 & S2.  Normal b/l radial and pedal pulses.    Neurological Examination:  General / Mental Status: AAO x 3.  No aphasia or dysarthria.  Naming and repetition intact.  Cranial Nerves: VFF x 4.  PERRL.  EOMI x 2, No nystagmus or diplopia.  B/l V1-V3 equal and intact to light touch and pinprick.  Symmetric facial movement and palate elevation.  B/l hearing equal to finger rub.  5/5 strength with b/l sternocleidomastoid & trapezius.  Midline tongue protrusion, with no atrophy or fasciculations.  Motor: Normal bulk & tone in all four extremities.  5/5 strength throughout all four extremities.  No downward drift, rigidity, spasticity, or tremors in any of the four extremities.  Sensory: Intact to light touch and pinprick in all four extremities.  Negative Romberg.  Reflex: 2+ and symmetric at b/l biceps, triceps, brachioradialis, patellae, and ankles.  Downgoing toes b/l.  Coordination: No dysmetria with b/l finger-to-nose and heel raise tests.  Symmetric rapid alternating movements b/l.  Gait: Normal, narrow-based gait.  No difficulty with tiptoe, heel, and tandem gaits.        LABORATORY VALUES:                        7.5    15.84 )-----------( 163      ( 09 Dec 2022 03:55 )             26.4       12-09    153<H>  |  118<H>  |  66<H>  ----------------------------<  90  3.9   |  27  |  3.42<H>    Ca    9.9      09 Dec 2022 03:55  Phos  3.7     12-09  Mg     2.4     12-09    TPro  6.0  /  Alb  2.0<L>  /  TBili  0.3  /  DBili  x   /  AST  7<L>  /  ALT  14  /  AlkPhos  66  12-09                NEUROIMAGING:          Please contact the Neurology consult service with any neurological questions.    Eric Rose MD   of Neurology  [facility] at hospitals/[facility] NEUROLOGY CONSULT NOTE    NAME:  GAYLA PEARCE      ASSESSMENT:  62 LHM with recurrent jerks in the setting of end-stage renal disease on hemodialysis and shock, likely exacerbation of essential tremor vs. myoclonic jerks, now resolved on Valproic acid      RECOMMENDATIONS:    - Okay to decrease Valproate 750mg IV from QID to BID       - Switch to Depakote ER 750mg PO BID when patient can tolerate PO medications    - Respiratory management and infectious workup as per primary team    - DVT ppx: SCDs, Heparin          NOTE TO BE COMPLETED - PLEASE REFER TO ABOVE ONLY AND IGNORE INFORMATION BELOW    *******************************      CHIEF COMPLAINT:  Patient is a 62y old  Male who presents with a chief complaint of Shock, Acute Encephalopathy (09 Dec 2022 15:09)      HPI:  62 year old morbid obese male, from [facility], has past medical hx of ambulatory dysfunction, ACD, bipolar disorder, BPH, CKD, HLD, PVD,  COPD not on oxygen, hypothyroidism, RBBB, lymphedema, CHF (last echo 2/22 normal EF, GIDD) was BIBEMS for hypotension 73/37 mmHg and bradycardia (40's). Story obtained from ED provider note as patient is not able to provide any history. Patient received 500 cc NS and one dose of atropine for bradycardia. Patient very somnolent during interview but arousable to verbal stimuli.  (30 Nov 2022 18:39)      NEURO HPI:      PAST MEDICAL & SURGICAL HISTORY:  Hypothyroid      Hyperlipidemia      Ambulatory dysfunction      Anemia      History of BPH      CKD (chronic kidney disease)      Chronic obstructive pulmonary disease (COPD)      Bipolar disorder      HLD (hyperlipidemia)      BPH (benign prostatic hyperplasia)      Chronic diastolic congestive heart failure      Lymphedema      No significant past surgical history          MEDICATIONS:  albuterol/ipratropium for Nebulization 3 milliLiter(s) Nebulizer every 6 hours  chlorhexidine 2% Cloths 1 Application(s) Topical <User Schedule>  collagenase Ointment 1 Application(s) Topical two times a day  doxazosin 1 milliGRAM(s) Oral at bedtime  heparin   Injectable 5000 Unit(s) SubCutaneous every 8 hours  levothyroxine 125 MICROGram(s) Oral daily  pantoprazole  Injectable 40 milliGRAM(s) IV Push daily  sevelamer carbonate Powder 800 milliGRAM(s) Enteral Tube three times a day  valproate sodium  IVPB 750 milliGRAM(s) IV Intermittent two times a day  zinc oxide 20% Ointment 1 Application(s) Topical two times a day      ALLERGIES:  No Known Allergies      FAMILY HISTORY:        SOCIAL HISTORY:  Denies alcohol, tobacco, or illicit drug use      REVIEW OF SYSTEMS:  GENERAL: No fever, weight changes, fatigue  EYES: No eye pain or discharge  EAR/NOSE/MOUTH/THROAT: No sinus or throat pain; No difficulty hearing  NECK: No pain or stiffness  RESPIRATORY: No cough, wheezing, chills, or hemoptysis  CARDIOVASCULAR: No chest pain, palpitations, shortness of breath, or dyspnea on exertion  GASTROINTESTINAL: No abdominal pain, nausea, vomiting, hematemesis, diarrhea, or constipation  GENITOURINARY: No dysuria, frequency, hematuria, or incontinence  SKIN: No rashes or lesions  ENDOCRINE: No heat or cold intolerance  HEMATOLOGIC: No easy bruising or bleeding  PSYCHIATRIC: No depression, anxiety, or mood swings  MUSCULOSKELETAL: No joint pain or swelling  NEUROLOGICAL: As per HPI        OBJECTIVE:    Vital Signs Last 24 Hrs  T(C): 36.8 (09 Dec 2022 20:00), Max: 37.3 (09 Dec 2022 04:00)  T(F): 98.3 (09 Dec 2022 20:00), Max: 99.1 (09 Dec 2022 04:00)  HR: 81 (09 Dec 2022 20:00) (76 - 99)  BP: 137/48 (09 Dec 2022 20:00) (119/46 - 157/62)  BP(mean): 71 (09 Dec 2022 20:00) (68 - 98)  RR: 21 (09 Dec 2022 20:47) (17 - 34)  SpO2: 94% (09 Dec 2022 20:47) (91% - 99%)    Parameters below as of 09 Dec 2022 20:47  Patient On (Oxygen Delivery Method): nasal cannula, high flow  O2 Flow (L/min): 40  O2 Concentration (%): 40    General Examination:  General: No acute distress  HEENT: Atraumatic, Normocephalic  Respiratory: CTA B/l.  No crackles, rhonchi, or wheezes.  Cardiovascular: RRR.  Normal S1 & S2.  Normal b/l radial and pedal pulses.    Neurological Examination:  General / Mental Status: AAO x 3.  No aphasia or dysarthria.  Naming and repetition intact.  Cranial Nerves: VFF x 4.  PERRL.  EOMI x 2, No nystagmus or diplopia.  B/l V1-V3 equal and intact to light touch and pinprick.  Symmetric facial movement and palate elevation.  B/l hearing equal to finger rub.  5/5 strength with b/l sternocleidomastoid & trapezius.  Midline tongue protrusion, with no atrophy or fasciculations.  Motor: Normal bulk & tone in all four extremities.  5/5 strength throughout all four extremities.  No downward drift, rigidity, spasticity, or tremors in any of the four extremities.  Sensory: Intact to light touch and pinprick in all four extremities.  Negative Romberg.  Reflex: 2+ and symmetric at b/l biceps, triceps, brachioradialis, patellae, and ankles.  Downgoing toes b/l.  Coordination: No dysmetria with b/l finger-to-nose and heel raise tests.  Symmetric rapid alternating movements b/l.  Gait: Normal, narrow-based gait.  No difficulty with tiptoe, heel, and tandem gaits.        LABORATORY VALUES:                        7.5    15.84 )-----------( 163      ( 09 Dec 2022 03:55 )             26.4       12-09    153<H>  |  118<H>  |  66<H>  ----------------------------<  90  3.9   |  27  |  3.42<H>    Ca    9.9      09 Dec 2022 03:55  Phos  3.7     12-09  Mg     2.4     12-09    TPro  6.0  /  Alb  2.0<L>  /  TBili  0.3  /  DBili  x   /  AST  7<L>  /  ALT  14  /  AlkPhos  66  12-09                NEUROIMAGING:          Please contact the Neurology consult service with any neurological questions.    Eric Rose MD   of Neurology  [facility] at hospitals/[facility] NEUROLOGY FOLLOW-UP NOTE    NAME:  GAYLA PEARCE      ASSESSMENT:  62 LHM with recurrent jerks in the setting of end-stage renal disease on hemodialysis and shock, likely exacerbation of essential tremor vs. myoclonic jerks, now resolved on Valproic acid      RECOMMENDATIONS:    - Okay to decrease Valproate 750mg IV from QID to BID       - Switch to Depakote ER 750mg PO BID when patient can tolerate PO medications    - Respiratory management and infectious workup as per primary team    - DVT ppx: SCDs, Heparin          *******************************      CHIEF COMPLAINT:  Patient is a 62y old  Male who presents with a chief complaint of Shock, Acute Encephalopathy (09 Dec 2022 15:09)      HPI:  62 year old morbid obese male, from [facility], has past medical hx of ambulatory dysfunction, ACD, bipolar disorder, BPH, CKD, HLD, PVD,  COPD not on oxygen, hypothyroidism, RBBB, lymphedema, CHF (last echo 2/22 normal EF, GIDD) was BIBEMS for hypotension 73/37 mmHg and bradycardia (40's). Story obtained from ED provider note as patient is not able to provide any history. Patient received 500 cc NS and one dose of atropine for bradycardia. Patient very somnolent during interview but arousable to verbal stimuli.  (30 Nov 2022 18:39)      NEURO HPI:  62 LHM presenting after experiencing bradycardia and hypotension, and then observed to have shaking of both arms and hands while being managed in the ICU.      INTERVAL HISTORY:  The patient continues to be managed on BiPAP. He is able to state that he has had a long history of essential tremor affecting both upper extremities.      MEDICATIONS:  albuterol/ipratropium for Nebulization 3 milliLiter(s) Nebulizer every 6 hours  chlorhexidine 2% Cloths 1 Application(s) Topical <User Schedule>  collagenase Ointment 1 Application(s) Topical two times a day  doxazosin 1 milliGRAM(s) Oral at bedtime  heparin   Injectable 5000 Unit(s) SubCutaneous every 8 hours  levothyroxine 125 MICROGram(s) Oral daily  pantoprazole  Injectable 40 milliGRAM(s) IV Push daily  sevelamer carbonate Powder 800 milliGRAM(s) Enteral Tube three times a day  valproate sodium  IVPB 750 milliGRAM(s) IV Intermittent two times a day  zinc oxide 20% Ointment 1 Application(s) Topical two times a day      ALLERGIES:  No Known Allergies      REVIEW OF SYSTEMS: All fourteen systems reviewed and negative except as in HPI / Neuro HPI.        OBJECTIVE:    Vital Signs Last 24 Hrs  T(C): 36.8 (09 Dec 2022 20:00), Max: 37.3 (09 Dec 2022 04:00)  T(F): 98.3 (09 Dec 2022 20:00), Max: 99.1 (09 Dec 2022 04:00)  HR: 81 (09 Dec 2022 20:00) (76 - 99)  BP: 137/48 (09 Dec 2022 20:00) (119/46 - 157/62)  BP(mean): 71 (09 Dec 2022 20:00) (68 - 98)  RR: 21 (09 Dec 2022 20:47) (17 - 34)  SpO2: 94% (09 Dec 2022 20:47) (91% - 99%)  Parameters below as of 09 Dec 2022 20:47  Patient On (Oxygen Delivery Method): nasal cannula, high flow  O2 Flow (L/min): 40  O2 Concentration (%): 40      General Examination:  General: No acute distress  HEENT: Atraumatic, Normocephalic  Respiratory: Rapid rate, Diminished breath sounds b/l  Cardiovascular: RRR.  Normal S1 & S2.  Normal b/l radial and pedal pulses.    Neurological Examination:  General / Mental Status: AAO x 1 (only to name).  No aphasia or dysarthria.  Cranial Nerves: B/l blink to threat present.  PERRL.  EOMI x 2, No nystagmus.  B/l V1-V3 equal and intact to light touch and pinprick.  Symmetric facial movement and palate elevation.  B/l hearing equal to finger rub.  At least 3/5 strength with b/l sternocleidomastoid & trapezius.  Midline tongue protrusion.  Motor: Normal bulk & tone in all four extremities.  At least 3/5 strength throughout all four extremities, limited by poor effort.  Downward drift to bed present in b/l lower extremities,  No downward drift present in b/l upper extremities. No rigidity, spasticity, or tremors in any of the four extremities.  Sensory: Intact to light touch and pinprick in all four extremities.  Reflex: 1+ and symmetric at b/l biceps, triceps, brachioradialis, patellae, and ankles.  Downgoing toes b/l.  Coordination: No dysmetria with b/l finger-to-nose and heel raise tests.  Gait and Romberg sign testing deferred while patient is on BiPAP.          LABORATORY VALUES:                        7.5    15.84 )-----------( 163      ( 09 Dec 2022 03:55 )             26.4       12-09    153<H>  |  118<H>  |  66<H>  ----------------------------<  90  3.9   |  27  |  3.42<H>    Ca    9.9      09 Dec 2022 03:55  Phos  3.7     12-09  Mg     2.4     12-09    TPro  6.0  /  Alb  2.0<L>  /  TBili  0.3  /  DBili  x   /  AST  7<L>  /  ALT  14  /  AlkPhos  66  12-09            NEUROIMAGING:      CT Head (12/1/22):  - No acute intracranial abnormality  - Chronic microvascular changes  - Mild diffuse atrophy  - Incidental inflammatory changes in sinuses      Routine EEG (12/3/22):  - Moderate generalized background slowing  - No seizure tendency identified      Repeat CT Head (12/4/22):  - No acute intracranial abnormality  - Mild chronic microvascular changes  - Mild diffuse atrophy      Routine & Continuous EEG (12/4/22 - 12/5/22):  - Mild/Moderate generalized background slowing  - No seizure tendency identified            Please contact the Neurology consult service with any neurological questions.    Eric Rose MD   of Neurology  [facility] at [facility]

## 2022-12-09 NOTE — SWALLOW BEDSIDE ASSESSMENT ADULT - SWALLOW EVAL: DIAGNOSIS
Pt p/w s&s of oropharyngeal dysphagia s/p extubation (12/4/22) c/b weak labial seal, decreased mastication ability 2/2 edentition, slow A-P transport, delayed swallow reflex, & decreased hyolaryngeal elevation. Overt s&s of aspiration/penetration observed AEB immediate cough & increased work of breathing following PO intake of thin liquids by cup. Pt also observed to have wet, upper airway sounds following PO intake of minced & moist.

## 2022-12-09 NOTE — PROGRESS NOTE ADULT - ATTENDING COMMENTS
62 year old morbid obese male, from [facility], has past medical hx of ambulatory dysfunction, ACD, bipolar disorder, BPH, CKD, HLD, PVD, COPD not on oxygen, hypothyroidism, RBBB, lymphedema, CHF (last echo 2/22 normal EF, GIDD). Presented for hypotension 73/37 mmHg and bradycardia (40's). Patient admitted to ICU for shock and acute encephalopathy. Now intubated on mechanical ventilation.     DX:  - Acute hypoxic resp failure   - Shock - septic vs hypovolemia  - Bilateral lower cellulitis   - Acute encephalopathy   - Sinus bradycardia  - HAGMA   - SHEYLA on CKD  - Anemia   - COPD not on oxygen at home   - HLD   - BPH  - Hypothyroidism   - Mood disorder       Plan:  - No further myoclonic activity this morning   - Off  Keppra , more awake   - EEG with out seizures  - Depressed mental status maybe due to meds ( Keppra) toxic metabolic   - Neurology consult noted, recommend prolonged EEG monitoring  - CT head unremarkable   - Monitor respiratory status   - CXR improved , Right atelectasis improved . trial of O2 via  HFNC   - Rotated pat right side up with aggressive Chest PT and suctioning   - Completed course of antibx   - CT scan showing basilar pneumonia   - ID following   - Cultures negative   - Monitor renal function  - HD as per Neph  - Viera for urinary obstruction   - Tube feedings via NGT   - Cont. home psych medications  - Cont. Levothyroxine  - DVT and stress ulcer prophylaxis.

## 2022-12-09 NOTE — PROGRESS NOTE ADULT - ASSESSMENT
1. SHEYLA due to ATN.  First HD 12/2. s/p shiley on 12/2. dialysis started since Scr not improved and remained at 5s.  -Scr improved at 3.4mg/dl and stable last few days and last HD on saturday with signs of renal recovery; probably doesn't need dialysis for now and can remove shiley if needed.  -Adjust meds to eGFR and avoid IV Gadolinium contrast,NSAIDs, and phosphate enema.  -Monitor I/O's daily.   -Monitor SMA daily.  2. CKD stage 4 most likely due to ischemic nephropathy  -baseline scr around 3.7mg/dL in july 2022.  -Keep patient euvolemic and renal diet  -Avoid Nephrotoxic Meds/ Agents such as (NSAIDs, IV contrast, Aminoglycosides such as gentamicin, -Gadolinium contrast, Phosphate containing enemas, etc..)  -Adjust Medications according to eGFR  3. Hypotension possible sepsis  -bp is stable and off pressor.  4. Mineral Bone Disease:  -phos is improving and PTH intact is 400.  -continue calcitriol 0.25mcg daily.  5. HAGMA and respiratory acidosis:  -abg noted.   -patient has respiratory acidosis and managed by ICU.   6. Bradycardia due to unclear etiology  -stable.  -trend troponins  -TSH is nl  7. Pulm:  -s/p intubated on 11/30 for worsening respiratory support.  -plan as per MICU.   8. Anemia of ESRD:  -iron panel and ferritin is acceptable.   -F/u CBC daily  -transfuse if HB < 7.0.  9. Encephalopathy:  -video EEG shows no seizures.    10. Hypernatremia due to water deficit and insensible losses. Pt is clinically hypervolemic.   -Na worsening and started on D5W but CXR shows more congestion. on free water 200cc 6hrs. may give metolazone 5mg once for diuresis if needed.   -Monitor I/O's. Check Serum Na Daily. Avoid high solute intake diet and sodium bicarbonate infuse. Avoid overcorrection of NA (8-10meq/day)      Patient is critically ill. Time Spent: 35mins.  Discussed the assessment and plan with ICU Team/Nurse

## 2022-12-09 NOTE — PROGRESS NOTE ADULT - SUBJECTIVE AND OBJECTIVE BOX
INTERVAL HPI/OVERNIGHT EVENTS:     PRESSORS: [ ] YES [ ] NO  WHICH:    ANTIBIOTICS:                  DATE STARTED:  ANTIBIOTICS:                  DATE STARTED:  ANTIBIOTICS:                  DATE STARTED:    Antimicrobial:    Cardiovascular:  doxazosin 1 milliGRAM(s) Oral at bedtime    Pulmonary:  albuterol/ipratropium for Nebulization 3 milliLiter(s) Nebulizer every 6 hours    Hematalogic:  heparin   Injectable 5000 Unit(s) SubCutaneous every 8 hours    Other:  chlorhexidine 2% Cloths 1 Application(s) Topical <User Schedule>  collagenase Ointment 1 Application(s) Topical two times a day  levothyroxine 125 MICROGram(s) Oral daily  pantoprazole  Injectable 40 milliGRAM(s) IV Push daily  sevelamer carbonate Powder 800 milliGRAM(s) Enteral Tube three times a day  valproate sodium  IVPB 750 milliGRAM(s) IV Intermittent every 6 hours  zinc oxide 20% Ointment 1 Application(s) Topical two times a day      Drug Dosing Weight  Height (cm): 177.8 (30 Nov 2022 18:45)  Weight (kg): 113.9 (30 Nov 2022 18:45)  BMI (kg/m2): 36 (30 Nov 2022 18:45)  BSA (m2): 2.3 (30 Nov 2022 18:45)    CENTRAL LINE: [ ] YES [ ] NO  LOCATION:   DATE INSERTED:  REMOVE: [ ] YES [ ] NO  EXPLAIN:    HATFIELD: [ ] YES [ ] NO    DATE INSERTED:  REMOVE:  [ ] YES [ ] NO  EXPLAIN:    A-LINE:  [ ] YES [ ] NO  LOCATION:   DATE INSERTED:  REMOVE:  [ ] YES [ ] NO  EXPLAIN:    PMH/Social Hx/Fam Hx -reviewed admission note, no change since admission  PAST MEDICAL & SURGICAL HISTORY:  Hypothyroid      Hyperlipidemia      Ambulatory dysfunction      Anemia      History of BPH      CKD (chronic kidney disease)      Chronic obstructive pulmonary disease (COPD)      Bipolar disorder      HLD (hyperlipidemia)      BPH (benign prostatic hyperplasia)      Chronic diastolic congestive heart failure      Lymphedema      No significant past surgical history        Heart faliure: acute [ ] chronic [ ] acute or chronic [ ] diastolic [ ] systolic [ ] combied systolic and diastolic[ ]  SHEYLA: ATN[ ] renal medullary necrosis [ ] CKD I [ ]CKDII [ ]CKD III [ ]CKD IV [ ]CKD V [ ]Other pathological lesions [ ]  Abdominal Nutrition Status: malnutrition [ ] cachexia [ ] morbid obesity/BMI=40 [ ] Supplement ordered [___________]     T(C): 36.3 (12-09-22 @ 08:00), Max: 37.3 (12-09-22 @ 04:00)  HR: 98 (12-09-22 @ 08:00)  BP: 145/53 (12-09-22 @ 08:00)  BP(mean): 79 (12-09-22 @ 08:00)  ABP: --  ABP(mean): --  RR: 20 (12-09-22 @ 08:00)  SpO2: 99% (12-09-22 @ 08:00)  Wt(kg): --    ABG - ( 08 Dec 2022 03:32 )  pH, Arterial: 7.33  pH, Blood: x     /  pCO2: 55    /  pO2: 190   / HCO3: 29    / Base Excess: 1.9   /  SaO2: 99                    12-08 @ 07:01  -  12-09 @ 07:00  --------------------------------------------------------  IN: 1080 mL / OUT: 2350 mL / NET: -1270 mL            PHYSICAL EXAM:    GENERAL: [ ]NAD, [ ]well-groomed, [ ]well-developed  HEAD:  [ ]Atraumatic, [ ]Normocephalic  EYES: [ ]EOMI, [ ]PERRLA, [ ]conjunctiva and sclera clear  ENMT: [ ]No tonsillar erythema, exudates, or enlargement; [ ]Moist mucous membranes, [ ]Good dentition, [ ]No lesions  NECK: [ ]Supple, normal appearance, [ ]No JVD; [ ]Normal thyroid; [ ]Trachea midline  NERVOUS SYSTEM:  [ ]Alert & Oriented X3, [ ]Good concentration; [ ]Motor Strength 5/5 B/L upper and lower extremities; [ ]DTRs 2+ intact and symmetric  CHEST/LUNG: [ ]No chest deformity; [ ]Normal percussion bilaterally; [ ]No rales, rhonchi, wheezing; [ ]Crackles at bases  HEART: [ ]Regular rate and rhythm; [ ]No murmurs, rubs, or gallops  ABDOMEN: [ ]Soft, Nontender, Nondistended; [ ]Bowel sounds present  EXTREMITIES:  [ ]2+ Peripheral Pulses, [ ]No clubbing, cyanosis, or edema [ ]Bilat lower extremity edema  LYMPH: [ ]No lymphadenopathy noted  SKIN: [ ]No rashes or lesions; [ ]Good capillary refill      LABS:  CBC Full  -  ( 09 Dec 2022 03:55 )  WBC Count : 15.84 K/uL  RBC Count : 2.51 M/uL  Hemoglobin : 7.5 g/dL  Hematocrit : 26.4 %  Platelet Count - Automated : 163 K/uL  Mean Cell Volume : 105.2 fl  Mean Cell Hemoglobin : 29.9 pg  Mean Cell Hemoglobin Concentration : 28.4 gm/dL  Auto Neutrophil # : x  Auto Lymphocyte # : x  Auto Monocyte # : x  Auto Eosinophil # : x  Auto Basophil # : x  Auto Neutrophil % : x  Auto Lymphocyte % : x  Auto Monocyte % : x  Auto Eosinophil % : x  Auto Basophil % : x    12-09    153<H>  |  118<H>  |  66<H>  ----------------------------<  90  3.9   |  27  |  3.42<H>    Ca    9.9      09 Dec 2022 03:55  Phos  3.7     12-09  Mg     2.4     12-09    TPro  6.0  /  Alb  2.0<L>  /  TBili  0.3  /  DBili  x   /  AST  7<L>  /  ALT  14  /  AlkPhos  66  12-09            RADIOLOGY & ADDITIONAL STUDIES REVIEWED:      [ ]GOALS OF CARE DISCUSSION WITH PATIENT/FAMILY/PROXY:    CRITICAL CARE TIME SPENT: 35 minutes INTERVAL HPI/OVERNIGHT EVENTS: NGT removed, patient passed swallow eval    Antimicrobial:    Cardiovascular:  doxazosin 1 milliGRAM(s) Oral at bedtime    Pulmonary:  albuterol/ipratropium for Nebulization 3 milliLiter(s) Nebulizer every 6 hours    Hematalogic:  heparin   Injectable 5000 Unit(s) SubCutaneous every 8 hours    Other:  chlorhexidine 2% Cloths 1 Application(s) Topical <User Schedule>  collagenase Ointment 1 Application(s) Topical two times a day  levothyroxine 125 MICROGram(s) Oral daily  pantoprazole  Injectable 40 milliGRAM(s) IV Push daily  sevelamer carbonate Powder 800 milliGRAM(s) Enteral Tube three times a day  valproate sodium  IVPB 750 milliGRAM(s) IV Intermittent every 6 hours  zinc oxide 20% Ointment 1 Application(s) Topical two times a day      Drug Dosing Weight  Height (cm): 177.8 (30 Nov 2022 18:45)  Weight (kg): 113.9 (30 Nov 2022 18:45)  BMI (kg/m2): 36 (30 Nov 2022 18:45)  BSA (m2): 2.3 (30 Nov 2022 18:45)      PMH/Social Hx/Fam Hx -reviewed admission note, no change since admission  PAST MEDICAL & SURGICAL HISTORY:  Hypothyroid      Hyperlipidemia      Ambulatory dysfunction      Anemia      History of BPH      CKD (chronic kidney disease)      Chronic obstructive pulmonary disease (COPD)      Bipolar disorder      HLD (hyperlipidemia)      BPH (benign prostatic hyperplasia)      Chronic diastolic congestive heart failure      Lymphedema      No significant past surgical history        T(C): 36.3 (12-09-22 @ 08:00), Max: 37.3 (12-09-22 @ 04:00)  HR: 98 (12-09-22 @ 08:00)  BP: 145/53 (12-09-22 @ 08:00)  BP(mean): 79 (12-09-22 @ 08:00)  ABP: --  ABP(mean): --  RR: 20 (12-09-22 @ 08:00)  SpO2: 99% (12-09-22 @ 08:00)  Wt(kg): --    ABG - ( 08 Dec 2022 03:32 )  pH, Arterial: 7.33  pH, Blood: x     /  pCO2: 55    /  pO2: 190   / HCO3: 29    / Base Excess: 1.9   /  SaO2: 99                    12-08 @ 07:01 - 12-09 @ 07:00  --------------------------------------------------------  IN: 1080 mL / OUT: 2350 mL / NET: -1270 mL            PHYSICAL EXAM:    GENERAL: NAD, resting in bed  HEAD:  Atraumatic, Normocephalic  EYES: EOMI, PERRLA, conjunctiva and sclera clear  NECK: Supple, normal appearance,  NERVOUS SYSTEM:  Alert & Oriented X2  CHEST/LUNG: Lungs clear to auscultation bilaterally, No rales, rhonchi, wheezing   HEART: Regular rate and rhythm; No murmurs, rubs, or gallops  ABDOMEN: Soft, Nontender, Nondistended; Bowel sounds present  EXTREMITIES:  Bilateral edema,   SKIN: Sacral wound bandage in place, posterrior R thigh ulcer bandage in place        LABS:  CBC Full  -  ( 09 Dec 2022 03:55 )  WBC Count : 15.84 K/uL  RBC Count : 2.51 M/uL  Hemoglobin : 7.5 g/dL  Hematocrit : 26.4 %  Platelet Count - Automated : 163 K/uL  Mean Cell Volume : 105.2 fl  Mean Cell Hemoglobin : 29.9 pg  Mean Cell Hemoglobin Concentration : 28.4 gm/dL  Auto Neutrophil # : x  Auto Lymphocyte # : x  Auto Monocyte # : x  Auto Eosinophil # : x  Auto Basophil # : x  Auto Neutrophil % : x  Auto Lymphocyte % : x  Auto Monocyte % : x  Auto Eosinophil % : x  Auto Basophil % : x    12-09    153<H>  |  118<H>  |  66<H>  ----------------------------<  90  3.9   |  27  |  3.42<H>    Ca    9.9      09 Dec 2022 03:55  Phos  3.7     12-09  Mg     2.4     12-09    TPro  6.0  /  Alb  2.0<L>  /  TBili  0.3  /  DBili  x   /  AST  7<L>  /  ALT  14  /  AlkPhos  66  12-09            RADIOLOGY & ADDITIONAL STUDIES REVIEWED:      [ ]GOALS OF CARE DISCUSSION WITH PATIENT/FAMILY/PROXY:    CRITICAL CARE TIME SPENT: 35 minutes

## 2022-12-09 NOTE — SWALLOW BEDSIDE ASSESSMENT ADULT - SLP PERTINENT HISTORY OF CURRENT PROBLEM
62 year old morbid obese male, from [facility], has past medical hx of ambulatory dysfunction, ACD, bipolar disorder, BPH, CKD, HLD, PVD, COPD not on oxygen, hypothyroidism, RBBB, lymphedema, CHF (last echo 2/22 normal EF, GIDD) was BIBEMS for hypotension 73/37 mmHg and bradycardia. Admitted to ICU for shock and acute encephalopathy. As per respiratory orders, pt intubated 11/30/22 & extubated 12/4/22. PT currently receiving NGT tube feeds. As per radiology, recent CXR (12/8/22) remarkable for further reaeration of R lung; moderate pulmonary venous congestion.

## 2022-12-09 NOTE — SWALLOW BEDSIDE ASSESSMENT ADULT - MODE OF PRESENTATION
x2 ice chips/spoon/fed by clinician x2 TSPN self-fed; x3 TSPN fed by clinician/spoon/self fed/fed by clinician x3 TSPN/spoon/fed by clinician x5 cup sips/cup/fed by clinician x3 TSPN & x2 cup sips/cup/spoon/fed by clinician

## 2022-12-09 NOTE — PROGRESS NOTE ADULT - SUBJECTIVE AND OBJECTIVE BOX
NEPHROLOGY MEDICAL CARE, [facility] - Dr. Taj Valenzuela/ Dr. Hazel Solis/ Dr. Cosmo Root/ Dr. Fang Gifford    Patient was seen and examined at bedside.    CC: patient is awake and asking for water.    Vital Signs Last 24 Hrs  T(C): 36.3 (09 Dec 2022 08:00), Max: 37.3 (09 Dec 2022 04:00)  T(F): 97.4 (09 Dec 2022 08:00), Max: 99.1 (09 Dec 2022 04:00)  HR: 98 (09 Dec 2022 08:30) (61 - 98)  BP: 145/53 (09 Dec 2022 08:00) (101/42 - 157/62)  BP(mean): 79 (09 Dec 2022 08:00) (59 - 98)  RR: 30 (09 Dec 2022 08:30) (17 - 30)  SpO2: 95% (09 Dec 2022 08:30) (87% - 100%)    Parameters below as of 09 Dec 2022 08:30  Patient On (Oxygen Delivery Method): nasal cannula, high flow  O2 Flow (L/min): 40  O2 Concentration (%): 40    12-08 @ 07:01  -  12-09 @ 07:00  --------------------------------------------------------  IN: 1080 mL / OUT: 2350 mL / NET: -1270 mL        PHYSICAL EXAM:  General: NAD on HFNC  Eyes: conjunctiva and sclera clear  ENMT: Atraumatic, Normocephalic; NGT  Respiratory: Bilateral poor air entry  Cardiovascular: S1S2+; no m/r/g  Gastrointestinal: Soft, Non-tender, Nondistended; Bowel sounds present,   : daley's cath with yellowish urine.  Neuro:  awake and asking for water.  Ext:  vascular skin changes of both legs; No Cyanosis  Skin: No visible rashes  Dialysis Access: Rt femoral shiley      MEDICATIONS:  MEDICATIONS  (STANDING):  albuterol/ipratropium for Nebulization 3 milliLiter(s) Nebulizer every 6 hours  chlorhexidine 2% Cloths 1 Application(s) Topical <User Schedule>  collagenase Ointment 1 Application(s) Topical two times a day  doxazosin 1 milliGRAM(s) Oral at bedtime  heparin   Injectable 5000 Unit(s) SubCutaneous every 8 hours  levothyroxine 125 MICROGram(s) Oral daily  pantoprazole  Injectable 40 milliGRAM(s) IV Push daily  sevelamer carbonate Powder 800 milliGRAM(s) Enteral Tube three times a day  valproate sodium  IVPB 750 milliGRAM(s) IV Intermittent every 6 hours  zinc oxide 20% Ointment 1 Application(s) Topical two times a day    MEDICATIONS  (PRN):          LABS:                        7.5    15.84 )-----------( 163      ( 09 Dec 2022 03:55 )             26.4     12-09    153<H>  |  118<H>  |  66<H>  ----------------------------<  90  3.9   |  27  |  3.42<H>    Ca    9.9      09 Dec 2022 03:55  Phos  3.7     12-09  Mg     2.4     12-09    TPro  6.0  /  Alb  2.0<L>  /  TBili  0.3  /  DBili  x   /  AST  7<L>  /  ALT  14  /  AlkPhos  66  12-09        Magnesium, Serum: 2.4 mg/dL (12-09 @ 03:55)  Phosphorus Level, Serum: 3.7 mg/dL (12-09 @ 03:55)    Urine studies    PTH and Vit D:

## 2022-12-09 NOTE — PROGRESS NOTE ADULT - SUBJECTIVE AND OBJECTIVE BOX
Podiatry HPI: Patient is seen in ICU bed, no longer intubated, now verbal. He reports he is feeling well, does not have pain in his bilateral extremities. Patient has CAIR boots on.     HPI:  62 year old morbid obese male, from [facility], has past medical hx of ambulatory dysfunction, ACD, bipolar disorder, BPH, CKD, HLD, PVD,  COPD not on oxygen, hypothyroidism, RBBB, lymphedema, CHF (last echo 2/22 normal EF, GIDD) was BIBEMS for hypotension 73/37 mmHg and bradycardia (40's). Story obtained from ED provider note as patient is not able to provide any history. Patient received 500 cc NS and one dose of atropine for bradycardia. Patient very somnolent during interview but arousable to verbal stimuli.  (30 Nov 2022 18:39)    Patient admits to  (-) Fevers, (-) Chills, (-) Nausea, (-) Vomiting, (-) Shortness of Breath (-) calf pain (-) chest pain     Medications albuterol/ipratropium for Nebulization 3 milliLiter(s) Nebulizer every 6 hours  chlorhexidine 2% Cloths 1 Application(s) Topical <User Schedule>  collagenase Ointment 1 Application(s) Topical two times a day  doxazosin 1 milliGRAM(s) Oral at bedtime  heparin   Injectable 5000 Unit(s) SubCutaneous every 8 hours  levothyroxine 125 MICROGram(s) Oral daily  pantoprazole  Injectable 40 milliGRAM(s) IV Push daily  sevelamer carbonate Powder 800 milliGRAM(s) Enteral Tube three times a day  valproate sodium  IVPB 750 milliGRAM(s) IV Intermittent every 6 hours  zinc oxide 20% Ointment 1 Application(s) Topical two times a day    FHNo pertinent family history in first degree relatives    ,   PMHNo pertinent past medical history    Hypothyroid    Hyperlipidemia    Schizophrenia    Schizoaffective disorder    Ambulatory dysfunction    Anemia    History of BPH    CKD (chronic kidney disease)    Chronic obstructive pulmonary disease (COPD)    Bipolar disorder    Bipolar disorder    Mood disorder    HLD (hyperlipidemia)    BPH (benign prostatic hyperplasia)    Anemia    Chronic diastolic congestive heart failure    Lymphedema       PSHNo significant past surgical history    No significant past surgical history        Labs                          7.5    15.84 )-----------( 163      ( 09 Dec 2022 03:55 )             26.4      12-09    153<H>  |  118<H>  |  66<H>  ----------------------------<  90  3.9   |  27  |  3.42<H>    Ca    9.9      09 Dec 2022 03:55  Phos  3.7     12-09  Mg     2.4     12-09    TPro  6.0  /  Alb  2.0<L>  /  TBili  0.3  /  DBili  x   /  AST  7<L>  /  ALT  14  /  AlkPhos  66  12-09     Vital Signs Last 24 Hrs  T(C): 36.7 (09 Dec 2022 12:00), Max: 37.3 (09 Dec 2022 04:00)  T(F): 98 (09 Dec 2022 12:00), Max: 99.1 (09 Dec 2022 04:00)  HR: 93 (09 Dec 2022 14:00) (76 - 98)  BP: 127/70 (09 Dec 2022 14:00) (104/43 - 157/62)  BP(mean): 84 (09 Dec 2022 14:00) (59 - 98)  RR: 26 (09 Dec 2022 14:00) (17 - 34)  SpO2: 94% (09 Dec 2022 14:00) (93% - 100%)    Parameters below as of 09 Dec 2022 14:00  Patient On (Oxygen Delivery Method): nasal cannula, high flow  O2 Flow (L/min): 40  O2 Concentration (%): 40          WBC Count: 15.84 K/uL *H* (12-09-22 @ 03:55)      PHYSICAL EXAM  Vasc:  DP and PT pulses non palpable due to b/l edema. TG: warm to warm. Pedal hair absent. Bilateral non pitting edema to LE.   Derm: pressure changes noted at bilateral heels. Improved appearance of bilateral extremities on exam. B/L venous stasis changes to skin with hemosiderin deposits. Peau d'orange appearance of skin due to venous stasis, no weeping on exam today. Edema is unchanged. No purulence or drainage noted at b/l legs on exam. No infected wounds on inspection of b/l lower extremity; no rash noted at b/l legs. Mild IDM noted in all interspaces. Hypertrophic and elongated toenails x 10  Neuro: unable to assess  MSK: unable to assess        A:  Chronic bilateral venous stasis with hemosiderin deposits in skin    P:   Patient evaluated and Chart reviewed  Inspected b/l extremities for signs of infection, no weeping or open wounds  Continue with IV antibiotics As Per ID  Continue wearing CAIR boots to avoid pressure ulcers as tolerated by patient  Recommend decub precautions  Podiatry to follow while in house  Outpatient nail care as needed  Discussed with Attending Dr. Navas

## 2022-12-09 NOTE — ADVANCED PRACTICE NURSE CONSULT - APN SPECIALTY LIST
Wound Ostomy Care Abdomen soft, non-tender and non-distended, no rebound, no guarding and no masses. no hepatosplenomegaly.

## 2022-12-09 NOTE — PROGRESS NOTE ADULT - CRITICAL CARE ATTENDING COMMENT
I counseled the primary team about the medication adjustments indicated to manage myoclonic jerks while avoiding medication adverse effects.
I counseled the primary team about the medication to maintain to help prevent myoclonic jerks.
I counseled the primary team about the medications to maintain to control myoclonic jerks.
I counseled the primary team about the medications to maintain to manage myoclonic jerks.
I counseled the patient and primary team about the medication that should be maintained to help prevent recurrence of tremors or myoclonic jerks in both upper extremities.

## 2022-12-09 NOTE — CHART NOTE - NSCHARTNOTEFT_GEN_A_CORE
Patient seen and examined at bedside for removal of femoral line. Chart and labs reviewed prior to removal, no contraindication to procedure. Area cleaned with alcohol swabs and suture removal kit used to remove sutures holding central line in place. Patient then placed in reverse Trendelenburg position, asked to hold breath/hum, and then central line removed. Pressure applied for 10 minutes with gauze. No signs of active bleeding noted. No hematoma formation noted. Tegaderm/gauze/tape used to create pressure dressing to maintain pressure on femoral line site. Patient tolerated well without complications.   Discussed procedure with RN who will monitor and notify the provider for bleeding, hematoma formation, or other procedural complications. Patient seen and examined at bedside for removal of femoral line. Chart and labs reviewed prior to removal, no contraindication to procedure. Area cleaned with alcohol swabs and suture removal kit used to remove sutures holding femoral line in place. Patient then placed in reverse Trendelenburg position, asked to hold breath/hum, and then central line removed. Pressure applied for 10 minutes with gauze. No signs of active bleeding noted. No hematoma formation noted. Tegaderm/gauze/tape used to create pressure dressing to maintain pressure on femoral line site. Patient tolerated well without complications.   Discussed procedure with RN who will monitor and notify the provider for bleeding, hematoma formation, or other procedural complications.

## 2022-12-10 NOTE — PROGRESS NOTE ADULT - ASSESSMENT
62 year old morbid obese male, from [facility], has past medical hx of ambulatory dysfunction, ACD, bipolar disorder, BPH, CKD, HLD, PVD, COPD not on oxygen, hypothyroidism, RBBB, lymphedema, CHF (last echo 2/22 normal EF, GIDD) was BIBEMS for hypotension 73/37 mmHg and bradycardia (40's). Patient admitted to ICU for shock and acute encephalopathy.     DX:  - Shock septic vs hypovolemia  - LE cellulitis   - Acute encephalopathy   - Sinus bradycardia  - HAGMA   - SHEYLA on CKD  - Anemia   - COPD not on oxygen at home   - HLD   - BPH  - Hypothyroidism   - Mood disorder       =================== Neuro============================  #Acute Encephalopathy; likely in the setting of acidemia, infection, uremia    Lethargic, fighting vent    Pain meds with Tylenol   CT Head negative for acute bleed/swelling  Awake and alert X2     #Seizure  - Home med includes valproic acid  - Patient is having repeated muscle spasms, improved on Versed but not fully stopped  - Still having muscle movements after dialysis which brought down BUN  - Started propofol instead of precedex  - Valproic acid level low, likely due to missed doses when patient was originally admitted.  - DC keppra per neuro recs, prefer valproate as less nephrotoxic  - spot EEG 12/3 - moderate generalized slowing  - f/u 24 hr EEG (12/4)  - Valproic Acid to 750mg Q6    ================= Cardiovascular==========================  Sinus bradycardia  - EKG sinus bradycardia, old RBBB  - TTE 12/1 - LVEF 60-65%  - trops neg  - Given normal EF, no changes in EKG, cardiogenic shock less likely    Hypotension  - off pressers      ================- Pulm=================================  COPD  - Patient with hx of COPD not in exacerbation   - Chest X ray Showed bilateral patchy infiltrates, no concern for fluid overload    - CXR 12/7 showed severe R Atelectasis, likely due to mucous plugging, chest PT, R side elevated  -CXR 12/7 3PM showed improvement with chest PT  -CXR 12/8 showed worsening edema, fluids stopped,   - Patient on high flow  - Oxygen supplementation as needed to maintain SpO2 of 88-92%      ==================ID===================================  Shock  - Concern for sepsis vs hypovolemic   - Empirical treatment with CTX for LE cellulitis and for pseudomonal coverage  - S/p 4L NS   - Started on pressors   F/u lactate 1.4, procalcitonin, Bcx, Ucx, CK NGTD  - ID consulted Dr Nolasco     ================= Nephro================================  SHEYLA on CKD  - On admission, patient with Cr 6.4>>5.6, baseline 3.7; stable at baseline today  - Worsening CKD most likely a combination of obstructive vs pre- renal   - Viera catheter placed in ED with 1000 cc of UO  - -58mL in the past 24 hours  - Avoid nephrotoxins, NSAIDS, ACEI and ARBS  - Monitor BMP daily  - Nephro Dr Valenzuela consulted  - S/p Dialysis 12/2 and 12/3, Creatnine near baseline, mental status improved  - Patient's renal function returned to baseline  [ ] Okay to remove Shiley    HAGMA  - Most likely from uremia   - Nephro Dr Valenzuela consulted  - Resolved    Hypernatremia  Likely hypervolemic hypernatremia, given fluid overload, and patient's refractory Na secondary to fluid.  [ ] Metolazone trial 5mg to induce Na excretion      =================GI====================================  NGT in place  Start tube feeds  Resume psych PO medications- Wellbutrin  Swallow evaluation  Bowel Regimen Give tonight,  Possible disimpaction tomorrow    ================ Heme==================================  Anemia   - Anemia of chronic disease   - Monitor CBC daily   - Hgb Stable no signs of active bleed    =================Endocrine===============================  Hypothyroidism  - F/u TSH Normal   - C/w home meds when not NPO     ================= Skin/Catheters============================  Peripheral IV lines   Viera catheter    Sacral and Posterior Thigh Ulcers, Wound Care Consulted    =================Prophylaxis =============================  DVT prophylaxis with Heparin SQ daily   GI prophylaxis with PPI daily    ==================GOC==================================  FULL CODE    62 year old morbid obese male, from [facility], has past medical hx of ambulatory dysfunction, ACD, bipolar disorder, BPH, CKD, HLD, PVD, COPD not on oxygen, hypothyroidism, RBBB, lymphedema, CHF (last echo 2/22 normal EF, GIDD) was BIBEMS for hypotension 73/37 mmHg and bradycardia (40's). Patient admitted to ICU for shock and acute encephalopathy.     DX:  - Shock septic vs hypovolemia  - LE cellulitis   - Acute encephalopathy   - Sinus bradycardia  - HAGMA   - SHEYLA on CKD  - Anemia   - COPD not on oxygen at home   - HLD   - BPH  - Hypothyroidism   - Mood disorder       =================== Neuro============================  #Acute Encephalopathy; likely in the setting of acidemia, infection, uremia    Lethargic, fighting vent    Pain meds with Tylenol   CT Head negative for acute bleed/swelling  Awake and alert X2     #Seizure  - Home med includes valproic acid  - Patient is having repeated muscle spasms, improved on Versed but not fully stopped  - Still having muscle movements after dialysis which brought down BUN  - Started propofol instead of precedex  - Valproic acid level low, likely due to missed doses when patient was originally admitted.  - DC keppra per neuro recs, prefer valproate as less nephrotoxic  - spot EEG 12/3 - moderate generalized slowing  - f/u 24 hr EEG (12/4)  - Valproic Acid to 750mg BID    ================= Cardiovascular==========================  Sinus bradycardia  - EKG sinus bradycardia, old RBBB  - TTE 12/1 - LVEF 60-65%  - trops neg  - Given normal EF, no changes in EKG, cardiogenic shock less likely    Hypotension  - off pressers      ================- Pulm=================================  COPD  - Patient with hx of COPD not in exacerbation   - Chest X ray Showed bilateral patchy infiltrates, no concern for fluid overload    - CXR 12/7 showed severe R Atelectasis, likely due to mucous plugging, chest PT, R side elevated  -CXR 12/7 3PM showed improvement with chest PT  -CXR 12/8 showed worsening edema, fluids stopped,   - Patient on high flow  - Oxygen supplementation as needed to maintain SpO2 of 88-92%      ==================ID===================================  Shock  - Concern for sepsis vs hypovolemic   - Empirical treatment with CTX for LE cellulitis and for pseudomonal coverage  - S/p 4L NS   - Started on pressors   F/u lactate 1.4, procalcitonin, Bcx, Ucx, CK NGTD  - ID consulted Dr Nolasco     ================= Nephro================================  SHEYLA on CKD  - On admission, patient with Cr 6.4>>5.6, baseline 3.7; stable at baseline today  - Worsening CKD most likely a combination of obstructive vs pre- renal   - Viera catheter placed in ED with 1000 cc of UO  - -58mL in the past 24 hours  - Avoid nephrotoxins, NSAIDS, ACEI and ARBS  - Monitor BMP daily  - Nephro Dr Valenzuela consulted  - S/p Dialysis 12/2 and 12/3, Creatnine near baseline, mental status improved  - Patient's renal function returned to baseline    HAGMA  - Most likely from uremia   - Nephro Dr Valenzuela consulted  - Resolved    Hypernatremia  Likely hypervolemic hypernatremia, given fluid overload, and patient's refractory Na secondary to fluid.  [ ] Metolazone trial 5mg to induce Na excretion      =================GI====================================  NGT in place  Start tube feeds  Resume psych PO medications- Wellbutrin  Swallow evaluation  Bowel Regimen Give tonight,  Possible disimpaction tomorrow    ================ Heme==================================  Anemia   - Anemia of chronic disease   - Monitor CBC daily   - Hgb Stable no signs of active bleed    =================Endocrine===============================  Hypothyroidism  - F/u TSH Normal   - C/w home meds when not NPO     ================= Skin/Catheters============================  Peripheral IV lines   Viera catheter    Sacral and Posterior Thigh Ulcers, Wound Care Consulted    =================Prophylaxis =============================  DVT prophylaxis with Heparin SQ daily   GI prophylaxis with PPI daily    ==================GOC==================================  FULL CODE    62 year old morbid obese male, from [facility], has past medical hx of ambulatory dysfunction, ACD, bipolar disorder, BPH, CKD, HLD, PVD, COPD not on oxygen, hypothyroidism, RBBB, lymphedema, CHF (last echo 2/22 normal EF, GIDD) was BIBEMS for hypotension 73/37 mmHg and bradycardia (40's). Patient admitted to ICU for shock and acute encephalopathy.     DX:  - Shock septic vs hypovolemia  - LE cellulitis   - Acute encephalopathy   - Sinus bradycardia  - HAGMA   - SHEYLA on CKD  - Anemia   - COPD not on oxygen at home   - HLD   - BPH  - Hypothyroidism   - Mood disorder       =================== Neuro============================  #Acute Encephalopathy; likely in the setting of acidemia, infection, uremia    Lethargic, fighting vent    Pain meds with Tylenol   CT Head negative for acute bleed/swelling  Awake and alert X2     #Seizure  - Home med includes valproic acid  - Patient is having repeated muscle spasms, improved on Versed but not fully stopped  - Still having muscle movements after dialysis which brought down BUN  - Started propofol instead of precedex  - Valproic acid level low, likely due to missed doses when patient was originally admitted.  - DC keppra per neuro recs, prefer valproate as less nephrotoxic  - spot EEG 12/3 - moderate generalized slowing  - f/u 24 hr EEG (12/4)  - Valproic Acid to 750mg BID    ================= Cardiovascular==========================  Sinus bradycardia  - EKG sinus bradycardia, old RBBB  - TTE 12/1 - LVEF 60-65%  - trops neg  - Given normal EF, no changes in EKG, cardiogenic shock less likely    Hypotension  - off pressers      ================- Pulm=================================  COPD  - Patient with hx of COPD not in exacerbation   - Chest X ray Showed bilateral patchy infiltrates, no concern for fluid overload    - CXR 12/7 showed severe R Atelectasis, likely due to mucous plugging, chest PT, R side elevated  -CXR 12/7 3PM showed improvement with chest PT  -CXR 12/8 showed worsening edema, fluids stopped,   - Patient on high flow  - Oxygen supplementation as needed to maintain SpO2 of 88-92%      ==================ID===================================  Shock  - Concern for sepsis vs hypovolemic   - Empirical treatment with CTX for LE cellulitis and for pseudomonal coverage  - S/p 4L NS   - Started on pressors   - Off Pressors  F/u lactate 1.4, procalcitonin, Bcx, Ucx, CK NGTD  - ID consulted Dr Nolasco   - Completed CTX    ================= Nephro================================  SHEYLA on CKD  - On admission, patient with Cr 6.4>>5.6, baseline 3.7; stable at baseline today  - Worsening CKD most likely a combination of obstructive vs pre- renal   - Viera catheter placed in ED with 1000 cc of UO  - -58mL in the past 24 hours  - Avoid nephrotoxins, NSAIDS, ACEI and ARBS  - Monitor BMP daily  - Nephro Dr Valenzuela consulted  - S/p Dialysis 12/2 and 12/3, Creatnine near baseline, mental status improved  - Patient's renal function returned to baseline    HAGMA  - Most likely from uremia   - Nephro Dr Valenzuela consulted  - Resolved    Hypernatremia  Likely hypervolemic hypernatremia, given fluid overload, and patient's refractory Na secondary to fluid.  [ ] Metolazone trial 5mg to induce Na excretion      =================GI====================================  Pureed Diet  Resume psych PO medications- Wellbutrin  Swallow evaluation  BM today, regular    ================ Heme==================================  Anemia   - Anemia of chronic disease   - Monitor CBC daily   - Hgb Stable no signs of active bleed    =================Endocrine===============================  Hypothyroidism  - F/u TSH Normal   - C/w home meds when not NPO     ================= Skin/Catheters============================  Peripheral IV lines   Viera catheter    Sacral and Posterior Thigh Ulcers, Wound Care Consulted    =================Prophylaxis =============================  DVT prophylaxis with Heparin SQ daily   GI prophylaxis with PPI daily    ==================GOC==================================  FULL CODE

## 2022-12-10 NOTE — PROGRESS NOTE ADULT - ATTENDING COMMENTS
62 year old morbid obese male, from [facility], has past medical hx of ambulatory dysfunction, ACD, bipolar disorder, BPH, CKD, HLD, PVD, COPD not on oxygen, hypothyroidism, RBBB, lymphedema, CHF (last echo 2/22 normal EF, GIDD). Presented for hypotension 73/37 mmHg and bradycardia (40's). Patient admitted to ICU for shock and acute encephalopathy. Now intubated on mechanical ventilation.     DX:  - Acute hypoxic resp failure   - Shock - septic vs hypovolemia  - Bilateral lower cellulitis   - Acute encephalopathy   - Sinus bradycardia  - HAGMA   - SHEYLA on CKD  - Anemia   - COPD not on oxygen at home   - HLD   - BPH  - Hypothyroidism   - Mood disorder       Plan:  - No further myoclonic activity this morning   - Off  Keppra , more awake   - EEG with out seizures  - Awake and more alert today   - Neurology consult noted, recommend prolonged EEG monitoring  - CT head unremarkable   - Monitor respiratory status   - CXR improved , Right atelectasis improved . trial of O2 via  HFNC   - Rotated pat right side up with aggressive Chest PT and suctioning   - Completed course of antibx   - CT scan showing basilar pneumonia   - ID following   - Cultures negative   - Monitor renal function  - HD as per Neph  - Viera for urinary obstruction   - Tube feedings via NGT   - Cont. home psych medications  - Cont. Levothyroxine  - DVT and stress ulcer prophylaxis.   - Transfer to AI unit

## 2022-12-10 NOTE — PHARMACOTHERAPY INTERVENTION NOTE - COMMENTS
Convert IV VPA to PO suspension, 750 mg Q12H --> 500 mg q8H
Increase  2 Q24H for body weight. Still requiring pressors, treatment for PNA.
Recommended switching from tamsulosin to doxazosin because the patient has a NGT and tamsulosin cannot be crushed or opened.
Recommended obtaining a Legionella urinary antigen in the setting of azithromycin ordered empirically for suspected pneumonia.    Marco Barnett PharmD  Clinical Pharmacy Specialist, Infectious Diseases  Tele-Antimicrobial Stewardship Program (Tele-ASP)  Tele-ASP Phone: [phone] 
Recommended to adjust cefepime dose from 1g IV q8h to 1g IV q12h since the creatinine clearance is 16 mL/min.    Jerica Barcenas, PharmD, [facility]  Clinical Pharmacy Specialist, Infectious Diseases  Tele-Antimicrobial Stewardship Program (Tele-ASP)  Tele-ASP Phone: [phone]

## 2022-12-10 NOTE — PROGRESS NOTE ADULT - SUBJECTIVE AND OBJECTIVE BOX
INTERVAL HPI/OVERNIGHT EVENTS:  Seen and examined at bedside. Comfortable    PRESSORS: [  ] YES [ X ] NO  WHICH:    Antimicrobial:    Cardiovascular:  doxazosin 1 milliGRAM(s) Oral at bedtime    Pulmonary:  albuterol/ipratropium for Nebulization 3 milliLiter(s) Nebulizer every 6 hours    Hematalogic:  heparin   Injectable 5000 Unit(s) SubCutaneous every 8 hours    Other:  chlorhexidine 2% Cloths 1 Application(s) Topical <User Schedule>  collagenase Ointment 1 Application(s) Topical two times a day  levothyroxine 125 MICROGram(s) Oral daily  pantoprazole  Injectable 40 milliGRAM(s) IV Push daily  sevelamer carbonate Powder 800 milliGRAM(s) Enteral Tube three times a day  valproate sodium  IVPB 750 milliGRAM(s) IV Intermittent two times a day  zinc oxide 20% Ointment 1 Application(s) Topical two times a day    albuterol/ipratropium for Nebulization 3 milliLiter(s) Nebulizer every 6 hours  chlorhexidine 2% Cloths 1 Application(s) Topical <User Schedule>  collagenase Ointment 1 Application(s) Topical two times a day  doxazosin 1 milliGRAM(s) Oral at bedtime  heparin   Injectable 5000 Unit(s) SubCutaneous every 8 hours  levothyroxine 125 MICROGram(s) Oral daily  pantoprazole  Injectable 40 milliGRAM(s) IV Push daily  sevelamer carbonate Powder 800 milliGRAM(s) Enteral Tube three times a day  valproate sodium  IVPB 750 milliGRAM(s) IV Intermittent two times a day  zinc oxide 20% Ointment 1 Application(s) Topical two times a day    Drug Dosing Weight  Height (cm): 177.8 (30 Nov 2022 18:45)  Weight (kg): 113.9 (30 Nov 2022 18:45)  BMI (kg/m2): 36 (30 Nov 2022 18:45)  BSA (m2): 2.3 (30 Nov 2022 18:45)    CENTRAL LINE: [ ] YES [ X] NO  LOCATION:   DATE INSERTED:  REMOVE: [ ] YES [ ] NO  EXPLAIN:    HATFIELD: [ X] YES [ ] NO    DATE INSERTED: 11/30  REMOVE:  [ ] YES [ ] NO  EXPLAIN:    A-LINE:  [ ] YES [X ] NO  LOCATION:   DATE INSERTED:  REMOVE:  [ ] YES [ ] NO  EXPLAIN:    PMH -reviewed admission note, no change since admission  PAST MEDICAL & SURGICAL HISTORY:  Hypothyroid      Hyperlipidemia      Ambulatory dysfunction      Anemia      History of BPH      CKD (chronic kidney disease)      Chronic obstructive pulmonary disease (COPD)      Bipolar disorder      HLD (hyperlipidemia)      BPH (benign prostatic hyperplasia)      Chronic diastolic congestive heart failure      Lymphedema      No significant past surgical history          ICU Vital Signs Last 24 Hrs  T(C): 36.8 (09 Dec 2022 20:00), Max: 37.3 (09 Dec 2022 04:00)  T(F): 98.3 (09 Dec 2022 20:00), Max: 99.1 (09 Dec 2022 04:00)  HR: 84 (10 Dec 2022 00:00) (76 - 99)  BP: 115/45 (10 Dec 2022 00:00) (110/51 - 157/62)  BP(mean): 66 (10 Dec 2022 00:00) (66 - 98)  ABP: --  ABP(mean): --  RR: 22 (10 Dec 2022 00:00) (17 - 34)  SpO2: 94% (10 Dec 2022 00:00) (91% - 99%)    O2 Parameters below as of 09 Dec 2022 23:00  Patient On (Oxygen Delivery Method): nasal cannula, high flow  O2 Flow (L/min): 40  O2 Concentration (%): 40        ABG - ( 08 Dec 2022 03:32 )  pH, Arterial: 7.33  pH, Blood: x     /  pCO2: 55    /  pO2: 190   / HCO3: 29    / Base Excess: 1.9   /  SaO2: 99                    12-08 @ 07:01  -  12-09 @ 07:00  --------------------------------------------------------  IN: 1080 mL / OUT: 2350 mL / NET: -1270 mL            PHYSICAL EXAM:  GENERAL: NAD, resting in bed  HEAD:  Atraumatic, Normocephalic  EYES: EOMI, PERRLA, conjunctiva and sclera clear  NECK: Supple, normal appearance,  NERVOUS SYSTEM:  Alert & Oriented X2  CHEST/LUNG: Lungs clear to auscultation bilaterally, No rales, rhonchi, wheezing   HEART: Regular rate and rhythm; No murmurs, rubs, or gallops  ABDOMEN: Soft, Nontender, Nondistended; Bowel sounds present  EXTREMITIES:  Bilateral edema,   SKIN: Sacral wound bandage in place, posterrior R thigh ulcer bandage in place      LABS:  CBC Full  -  ( 09 Dec 2022 03:55 )  WBC Count : 15.84 K/uL  RBC Count : 2.51 M/uL  Hemoglobin : 7.5 g/dL  Hematocrit : 26.4 %  Platelet Count - Automated : 163 K/uL  Mean Cell Volume : 105.2 fl  Mean Cell Hemoglobin : 29.9 pg  Mean Cell Hemoglobin Concentration : 28.4 gm/dL  Auto Neutrophil # : x  Auto Lymphocyte # : x  Auto Monocyte # : x  Auto Eosinophil # : x  Auto Basophil # : x  Auto Neutrophil % : x  Auto Lymphocyte % : x  Auto Monocyte % : x  Auto Eosinophil % : x  Auto Basophil % : x    12-09    153<H>  |  118<H>  |  66<H>  ----------------------------<  90  3.9   |  27  |  3.42<H>    Ca    9.9      09 Dec 2022 03:55  Phos  3.7     12-09  Mg     2.4     12-09    TPro  6.0  /  Alb  2.0<L>  /  TBili  0.3  /  DBili  x   /  AST  7<L>  /  ALT  14  /  AlkPhos  66  12-09            RADIOLOGY & ADDITIONAL STUDIES REVIEWED:  ***    [ ]GOALS OF CARE DISCUSSION WITH PATIENT/FAMILY/PROXY:    CRITICAL CARE TIME SPENT: 35 minutes

## 2022-12-10 NOTE — PROGRESS NOTE ADULT - ASSESSMENT
1. SHEYLA due to ATN.  First HD 12/2. s/p shiley on 12/2. dialysis started since Scr not improved and remained at 5s.  -Scr stable at 3.5mg/dl and stable last few days and last HD on saturday (12/3) with signs of renal recovery. no need HD and shiley was removed.   -Adjust meds to eGFR and avoid IV Gadolinium contrast,NSAIDs, and phosphate enema.  -Monitor I/O's daily.   -Monitor SMA daily.  2. CKD stage 4 most likely due to ischemic nephropathy  -baseline scr around 3.7mg/dL in july 2022.  -Keep patient euvolemic and renal diet  -Avoid Nephrotoxic Meds/ Agents such as (NSAIDs, IV contrast, Aminoglycosides such as gentamicin, -Gadolinium contrast, Phosphate containing enemas, etc..)  -Adjust Medications according to eGFR  3. Hypotension possible sepsis  -bp is stable and off pressor.  4. Mineral Bone Disease:  -phos is improving and PTH intact is 400.  -continue calcitriol 0.25mcg daily.  5. HAGMA and respiratory acidosis:  -abg noted.   -patient has respiratory acidosis and managed by ICU.   6. Bradycardia due to unclear etiology  -stable.  -trend troponins  -TSH is nl  7. Pulm:  -s/p intubated on 11/30 for worsening respiratory support.  -plan as per MICU.   8. Anemia of ESRD:  -iron panel and ferritin is acceptable.   -F/u CBC daily  -transfuse if HB < 7.0.  9. Encephalopathy:  -video EEG shows no seizures.    10. Hypernatremia due to water deficit and insensible losses. Pt is clinically hypervolemic.   -Na is unchanged and started on D5W but CXR shows still shows congestion. on free water 200cc 6hrs. may give lasix and metolazone 5mg once for diuresis if needed or diamox.   -Monitor I/O's. Check Serum Na Daily. Avoid high solute intake diet and sodium bicarbonate infuse. Avoid overcorrection of NA (8-10meq/day)      Patient is critically ill. Time Spent: 35mins.  Discussed the assessment and plan with ICU Team/Nurse

## 2022-12-10 NOTE — PROGRESS NOTE ADULT - SUBJECTIVE AND OBJECTIVE BOX
NEPHROLOGY MEDICAL CARE, [facility] - Dr. Taj Valenzuela/ Dr. Hazel Solis/ Dr. Cosmo Root/ Dr. Fang Gifford    Patient was seen and examined at bedside.    CC: pt is awake and on HFNC    Vital Signs Last 24 Hrs  T(C): 36.9 (10 Dec 2022 08:00), Max: 37.1 (10 Dec 2022 04:00)  T(F): 98.4 (10 Dec 2022 08:00), Max: 98.7 (10 Dec 2022 04:00)  HR: 78 (10 Dec 2022 09:00) (78 - 99)  BP: 142/64 (10 Dec 2022 09:00) (110/51 - 151/59)  BP(mean): 87 (10 Dec 2022 09:00) (66 - 90)  RR: 28 (10 Dec 2022 09:00) (20 - 34)  SpO2: 93% (10 Dec 2022 09:00) (89% - 97%)    Parameters below as of 10 Dec 2022 08:00  Patient On (Oxygen Delivery Method): nasal cannula, high flow  O2 Flow (L/min): 40  O2 Concentration (%): 50    12-09 @ 07:01  -  12-10 @ 07:00  --------------------------------------------------------  IN: 830 mL / OUT: 2850 mL / NET: -2020 mL    12-10 @ 07:01  -  12-10 @ 11:19  --------------------------------------------------------  IN: 0 mL / OUT: 200 mL / NET: -200 mL        PHYSICAL EXAM:  General: NAD on HFNC  Eyes: conjunctiva and sclera clear  ENMT: Atraumatic, Normocephalic; NGT  Respiratory: Bilateral poor air entry  Cardiovascular: S1S2+; no m/r/g  Gastrointestinal: Soft, Non-tender, Nondistended; Bowel sounds present,   : daley's cath with yellowish urine.  Neuro:  awake and asking for water.  Ext:  vascular skin changes of both legs; No Cyanosis  Skin: No visible rashes    MEDICATIONS:  MEDICATIONS  (STANDING):  albuterol/ipratropium for Nebulization 3 milliLiter(s) Nebulizer every 6 hours  chlorhexidine 2% Cloths 1 Application(s) Topical <User Schedule>  collagenase Ointment 1 Application(s) Topical two times a day  doxazosin 1 milliGRAM(s) Oral at bedtime  heparin   Injectable 5000 Unit(s) SubCutaneous every 8 hours  levothyroxine 125 MICROGram(s) Oral daily  pantoprazole   Suspension 40 milliGRAM(s) Oral daily  sevelamer carbonate Powder 800 milliGRAM(s) Enteral Tube three times a day  valproate sodium  IVPB 750 milliGRAM(s) IV Intermittent two times a day  zinc oxide 20% Ointment 1 Application(s) Topical two times a day    MEDICATIONS  (PRN):          LABS:                        7.5    12.65 )-----------( 196      ( 10 Dec 2022 04:25 )             26.6     12-10    154<H>  |  119<H>  |  59<H>  ----------------------------<  114<H>  3.5   |  31  |  3.50<H>    Ca    9.8      10 Dec 2022 04:25  Phos  2.7     12-10  Mg     2.2     12-10    TPro  6.4  /  Alb  1.8<L>  /  TBili  0.2  /  DBili  x   /  AST  5<L>  /  ALT  9<L>  /  AlkPhos  70  12-10        Magnesium, Serum: 2.2 mg/dL (12-10 @ 04:25)  Phosphorus Level, Serum: 2.7 mg/dL (12-10 @ 04:25)    Urine studies    PTH and Vit D:

## 2022-12-11 NOTE — PROGRESS NOTE ADULT - ATTENDING COMMENTS
62 year old morbid obese male, from [facility], has past medical hx of ambulatory dysfunction, ACD, bipolar disorder, BPH, CKD, HLD, PVD, COPD not on oxygen, hypothyroidism, RBBB, lymphedema, CHF (last echo 2/22 normal EF, GIDD). Presented for hypotension 73/37 mmHg and bradycardia (40's). Patient admitted to ICU for shock and acute encephalopathy. Now intubated on mechanical ventilation.     DX:  - Acute hypoxic resp failure   - Shock - septic vs hypovolemia  - Bilateral lower cellulitis   - Acute encephalopathy   - Sinus bradycardia  - HAGMA   - SHEYLA on CKD  - Anemia   - COPD not on oxygen at home   - HLD   - BPH  - Hypothyroidism   - Mood disorder       Plan:  - No further myoclonic activity this morning   - Off  Keppra , more awake   - EEG with out seizures  - Awake and more alert today   - Neurology consult noted, recommend prolonged EEG monitoring  - CT head unremarkable   - Monitor respiratory status   - CXR improved , Right atelectasis improved . trial of O2 via  HFNC   - Rotated pat right side up with aggressive Chest PT and suctioning   - Completed course of antibx   - CT scan showing basilar pneumonia   - ID following   - Cultures negative   - Monitor renal function  - HD as per Neph  - Viera for urinary obstruction , failed trial of void   - Cont. home psych medications  - Cont. Levothyroxine  - DVT and stress ulcer prophylaxis.   - Transfer to AI unit.

## 2022-12-11 NOTE — PROGRESS NOTE ADULT - ASSESSMENT
62 year old morbid obese male, from [facility], has past medical hx of ambulatory dysfunction, ACD, bipolar disorder, BPH, CKD, HLD, PVD, COPD not on oxygen, hypothyroidism, RBBB, lymphedema, CHF (last echo 2/22 normal EF, GIDD) was BIBEMS for hypotension 73/37 mmHg and bradycardia (40's). Patient admitted to ICU for shock and acute encephalopathy.     DX:  - Shock septic vs hypovolemia  - LE cellulitis   - Acute encephalopathy   - Sinus bradycardia  - HAGMA   - SHEYLA on CKD  - Anemia   - COPD not on oxygen at home   - HLD   - BPH  - Hypothyroidism   - Mood disorder       =================== Neuro============================  #Acute Encephalopathy; likely in the setting of acidemia, infection, uremia    Lethargic, fighting vent    Pain meds with Tylenol   CT Head negative for acute bleed/swelling  Awake and alert X2     #Seizure  - Home med includes valproic acid  - Patient is having repeated muscle spasms, improved on Versed but not fully stopped  - Still having muscle movements after dialysis which brought down BUN  - Started propofol instead of precedex  - Valproic acid level low, likely due to missed doses when patient was originally admitted.  - DC keppra per neuro recs, prefer valproate as less nephrotoxic  - spot EEG 12/3 - moderate generalized slowing  - f/u 24 hr EEG (12/4)  - Valproic Acid to 750mg BID    ================= Cardiovascular==========================  Sinus bradycardia  - EKG sinus bradycardia, old RBBB  - TTE 12/1 - LVEF 60-65%  - trops neg  - Given normal EF, no changes in EKG, cardiogenic shock less likely    Hypotension  - off pressers      ================- Pulm=================================  COPD  - Patient with hx of COPD not in exacerbation   - Chest X ray Showed bilateral patchy infiltrates, no concern for fluid overload    - CXR 12/7 showed severe R Atelectasis, likely due to mucous plugging, chest PT, R side elevated  -CXR 12/7 3PM showed improvement with chest PT  -CXR 12/8 showed worsening edema, fluids stopped,   - Patient on high flow  - Oxygen supplementation as needed to maintain SpO2 of 88-92%      ==================ID===================================  Shock  - Concern for sepsis vs hypovolemic   - Empirical treatment with CTX for LE cellulitis and for pseudomonal coverage  - S/p 4L NS   - Started on pressors   - Off Pressors  F/u lactate 1.4, procalcitonin, Bcx, Ucx, CK NGTD  - ID consulted Dr Nolasco   - Completed CTX    ================= Nephro================================  SHEYLA on CKD  - On admission, patient with Cr 6.4>>5.6, baseline 3.7; stable at baseline today  - Worsening CKD most likely a combination of obstructive vs pre- renal   - Viera catheter placed in ED with 1000 cc of UO  - -58mL in the past 24 hours  - Avoid nephrotoxins, NSAIDS, ACEI and ARBS  - Monitor BMP daily  - Nephro Dr Valenzuela consulted  - S/p Dialysis 12/2 and 12/3, Creatnine near baseline, mental status improved  - Patient's renal function returned to baseline    HAGMA  - Most likely from uremia   - Nephro Dr Valenzuela consulted  - Resolved    Hypernatremia  Likely hypervolemic hypernatremia, given fluid overload, and patient's refractory Na secondary to fluid.  [ ] Metolazone trial 5mg to induce Na excretion      =================GI====================================  Pureed Diet  Resume psych PO medications- Wellbutrin  Swallow evaluation  BM today, regular    ================ Heme==================================  Anemia   - Anemia of chronic disease   - Monitor CBC daily   - Hgb Stable no signs of active bleed    =================Endocrine===============================  Hypothyroidism  - F/u TSH Normal   - C/w home meds when not NPO     ================= Skin/Catheters============================  Peripheral IV lines   Viera catheter    Sacral and Posterior Thigh Ulcers, Wound Care Consulted    =================Prophylaxis =============================  DVT prophylaxis with Heparin SQ daily   GI prophylaxis with PPI daily    ==================GOC==================================  FULL CODE    62 year old morbid obese male, from [facility], has past medical hx of ambulatory dysfunction, ACD, bipolar disorder, BPH, CKD, HLD, PVD, COPD not on oxygen, hypothyroidism, RBBB, lymphedema, CHF (last echo 2/22 normal EF, GIDD) was BIBEMS for hypotension 73/37 mmHg and bradycardia (40's). Patient admitted to ICU for shock and acute encephalopathy.     DX:  - Shock septic vs hypovolemia  - LE cellulitis   - Acute encephalopathy   - Sinus bradycardia  - HAGMA   - SHEYLA on CKD  - Anemia   - COPD not on oxygen at home   - HLD   - BPH  - Hypothyroidism   - Mood disorder       =================== Neuro============================  #Acute Encephalopathy; likely in the setting of acidemia, infection, uremia    Lethargic, fighting vent    Pain meds with Tylenol   CT Head negative for acute bleed/swelling  Awake and alert X2     #Seizure  - Home med includes valproic acid  - Patient is having repeated muscle spasms, improved on Versed but not fully stopped  - Still having muscle movements after dialysis which brought down BUN  - Started propofol instead of precedex  - Valproic acid level low, likely due to missed doses when patient was originally admitted.  - DC keppra per neuro recs, prefer valproate as less nephrotoxic  - spot EEG 12/3 - moderate generalized slowing  - 24 hr EEG (12/4)-no change, No epileptiform pattern or seizure seen.  - Valproic Acid to 750mg BID    ================= Cardiovascular==========================  Sinus bradycardia  - EKG sinus bradycardia, old RBBB  - TTE 12/1 - LVEF 60-65%  - trops neg  - Given normal EF, no changes in EKG, cardiogenic shock less likely    Hypotension  - off pressers      ================- Pulm=================================  COPD  - Patient with hx of COPD not in exacerbation   - Chest X ray Showed bilateral patchy infiltrates, no concern for fluid overload    - CXR 12/7 showed severe R Atelectasis, likely due to mucous plugging, chest PT, R side elevated  -CXR 12/7 3PM showed improvement with chest PT  -CXR 12/8 showed worsening edema, fluids stopped,   - Patient on high flow  - Oxygen supplementation as needed to maintain SpO2 of 88-92%      ==================ID===================================  Shock  - Concern for sepsis vs hypovolemic   - Empirical treatment with CTX for LE cellulitis and for pseudomonal coverage  - S/p 4L NS   - Started on pressors   - Off Pressors  F/u lactate 1.4, procalcitonin, Bcx, Ucx, CK NGTD  - ID consulted Dr Nolasco   - Completed CTX    ================= Nephro================================  SHEYLA on CKD  - On admission, patient with Cr 6.4>>5.6, baseline 3.7; stable at baseline today  - Worsening CKD most likely a combination of obstructive vs pre- renal   - Viera catheter placed in ED with 1000 cc of UO  - -58mL in the past 24 hours  - Avoid nephrotoxins, NSAIDS, ACEI and ARBS  - Monitor BMP daily  - Nephro Dr Valenzuela consulted  - S/p Dialysis 12/2 and 12/3, Creatnine near baseline, mental status improved  - Patient's renal function returned to baseline    HAGMA  - Most likely from uremia   - Nephro Dr Valenzuela consulted  - Resolved    Hypernatremia  Likely hypervolemic hypernatremia, given fluid overload, and patient's refractory Na secondary to fluid.  c/w Metolazone trial 5mg to induce Na excretion      =================GI====================================  Pureed Diet  Resume psych PO medications- Wellbutrin  Swallow evaluation  BM today, regular    ================ Heme==================================  Anemia   - Anemia of chronic disease   - Monitor CBC daily   - Hgb Stable no signs of active bleed    =================Endocrine===============================  Hypothyroidism  - F/u TSH Normal   - C/w home meds when not NPO     ================= Skin/Catheters============================  Peripheral IV lines   Viera catheter    Sacral and Posterior Thigh Ulcers, Wound Care Consulted    =================Prophylaxis =============================  DVT prophylaxis with Heparin SQ daily   GI prophylaxis with PPI daily    ==================GOC==================================  FULL CODE

## 2022-12-11 NOTE — PROGRESS NOTE ADULT - SUBJECTIVE AND OBJECTIVE BOX
INTERVAL HPI/OVERNIGHT EVENTS:  Seen and examined at bedside. Comfortable    PRESSORS: [  ] YES [ X ] NO  WHICH:    Antimicrobial:    Cardiovascular:  doxazosin 1 milliGRAM(s) Oral at bedtime    Pulmonary:  albuterol/ipratropium for Nebulization 3 milliLiter(s) Nebulizer every 6 hours    Hematalogic:  heparin   Injectable 5000 Unit(s) SubCutaneous every 8 hours    Other:  buPROPion XL (24-Hour) . 150 milliGRAM(s) Oral daily  chlorhexidine 2% Cloths 1 Application(s) Topical <User Schedule>  collagenase Ointment 1 Application(s) Topical two times a day  levothyroxine 125 MICROGram(s) Oral daily  pantoprazole    Tablet 40 milliGRAM(s) Oral before breakfast  risperiDONE   Tablet 2 milliGRAM(s) Oral at bedtime  sevelamer carbonate Powder 800 milliGRAM(s) Enteral Tube three times a day  valproate sodium  IVPB 750 milliGRAM(s) IV Intermittent two times a day  zinc oxide 20% Ointment 1 Application(s) Topical two times a day      Drug Dosing Weight  Height (cm): 177.8 (30 Nov 2022 18:45)  Weight (kg): 113.9 (30 Nov 2022 18:45)  BMI (kg/m2): 36 (30 Nov 2022 18:45)  BSA (m2): 2.3 (30 Nov 2022 18:45)      CENTRAL LINE: [ ] YES [ X] NO  LOCATION:   DATE INSERTED:  REMOVE: [ ] YES [ ] NO  EXPLAIN:    HATFIELD: [ X] YES [ ] NO    DATE INSERTED: 11/30 removed 12/10 and reinserted for retention       A-LINE:  [ ] YES [X ] NO  LOCATION:   DATE INSERTED:  REMOVE:  [ ] YES [ ] NO  EXPLAIN:    PMH/Social Hx/Fam Hx -reviewed admission note, no change since admission  PAST MEDICAL & SURGICAL HISTORY:  Hypothyroid      Hyperlipidemia      Ambulatory dysfunction      Anemia      History of BPH      CKD (chronic kidney disease)      Chronic obstructive pulmonary disease (COPD)      Bipolar disorder      HLD (hyperlipidemia)      BPH (benign prostatic hyperplasia)      Chronic diastolic congestive heart failure      Lymphedema      No significant past surgical history        Heart faliure: acute [ ] chronic [ ] acute or chronic [ ] diastolic [ ] systolic [ ] combied systolic and diastolic[ ]  SHEYLA: ATN[ ] renal medullary necrosis [ ] CKD I [ ]CKDII [ ]CKD III [ ]CKD IV [ ]CKD V [ ]Other pathological lesions [ ]  Abdominal Nutrition Status: malnutrition [ ] cachexia [ ] morbid obesity/BMI=40 [ ] Supplement ordered [___________]     T(C): 37 (12-10-22 @ 20:00), Max: 37.1 (12-10-22 @ 04:00)  HR: 87 (12-10-22 @ 23:00)  BP: 117/53 (12-10-22 @ 23:00)  BP(mean): 72 (12-10-22 @ 23:00)  ABP: --  ABP(mean): --  RR: 20 (12-10-22 @ 23:00)  SpO2: 93% (12-10-22 @ 23:00)  Wt(kg): --          12-09 @ 07:01  -  12-10 @ 07:00  --------------------------------------------------------  IN: 830 mL / OUT: 2850 mL / NET: -2020 mL          PHYSICAL EXAM:  GENERAL: NAD, resting in bed  HEAD:  Atraumatic, Normocephalic  EYES: EOMI, PERRLA, conjunctiva and sclera clear  NECK: Supple, normal appearance,  NERVOUS SYSTEM:  Alert & Oriented X2  CHEST/LUNG: Lungs clear to auscultation bilaterally, No rales, rhonchi, wheezing   HEART: Regular rate and rhythm; No murmurs, rubs, or gallops  ABDOMEN: Soft, Nontender, Nondistended; Bowel sounds present  EXTREMITIES:  Bilateral edema,   SKIN: Sacral wound bandage in place, posterrior R thigh ulcer bandage in place      LABS:  CBC Full  -  ( 10 Dec 2022 04:25 )  WBC Count : 12.65 K/uL  RBC Count : 2.53 M/uL  Hemoglobin : 7.5 g/dL  Hematocrit : 26.6 %  Platelet Count - Automated : 196 K/uL  Mean Cell Volume : 105.1 fl  Mean Cell Hemoglobin : 29.6 pg  Mean Cell Hemoglobin Concentration : 28.2 gm/dL  Auto Neutrophil # : x  Auto Lymphocyte # : x  Auto Monocyte # : x  Auto Eosinophil # : x  Auto Basophil # : x  Auto Neutrophil % : x  Auto Lymphocyte % : x  Auto Monocyte % : x  Auto Eosinophil % : x  Auto Basophil % : x    12-10    154<H>  |  119<H>  |  59<H>  ----------------------------<  114<H>  3.5   |  31  |  3.50<H>    Ca    9.8      10 Dec 2022 04:25  Phos  2.7     12-10  Mg     2.2     12-10    TPro  6.4  /  Alb  1.8<L>  /  TBili  0.2  /  DBili  x   /  AST  5<L>  /  ALT  9<L>  /  AlkPhos  70  12-10            RADIOLOGY & ADDITIONAL STUDIES REVIEWED:      [ ]GOALS OF CARE DISCUSSION WITH PATIENT/FAMILY/PROXY:    CRITICAL CARE TIME SPENT: 35 minutes INTERVAL HPI/OVERNIGHT EVENTS:  Seen and examined at bedside. Comfortable    PRESSORS: [  ] YES [ X ] NO  WHICH:    Antimicrobial:    Cardiovascular:  doxazosin 1 milliGRAM(s) Oral at bedtime    Pulmonary:  albuterol/ipratropium for Nebulization 3 milliLiter(s) Nebulizer every 6 hours    Hematalogic:  heparin   Injectable 5000 Unit(s) SubCutaneous every 8 hours    Other:  buPROPion XL (24-Hour) . 150 milliGRAM(s) Oral daily  chlorhexidine 2% Cloths 1 Application(s) Topical <User Schedule>  collagenase Ointment 1 Application(s) Topical two times a day  levothyroxine 125 MICROGram(s) Oral daily  pantoprazole    Tablet 40 milliGRAM(s) Oral before breakfast  risperiDONE   Tablet 2 milliGRAM(s) Oral at bedtime  sevelamer carbonate Powder 800 milliGRAM(s) Enteral Tube three times a day  valproate sodium  IVPB 750 milliGRAM(s) IV Intermittent two times a day  zinc oxide 20% Ointment 1 Application(s) Topical two times a day      Drug Dosing Weight  Height (cm): 177.8 (30 Nov 2022 18:45)  Weight (kg): 113.9 (30 Nov 2022 18:45)  BMI (kg/m2): 36 (30 Nov 2022 18:45)  BSA (m2): 2.3 (30 Nov 2022 18:45)      CENTRAL LINE: [ ] YES [ X] NO  LOCATION:   DATE INSERTED:  REMOVE: [ ] YES [ ] NO  EXPLAIN:    HATFIELD: [ X] YES [ ] NO    DATE INSERTED: 11/30 removed 12/10 and reinserted for retention       A-LINE:  [ ] YES [X ] NO  LOCATION:   DATE INSERTED:  REMOVE:  [ ] YES [ ] NO  EXPLAIN:    PMH/Social Hx/Fam Hx -reviewed admission note, no change since admission  PAST MEDICAL & SURGICAL HISTORY:  Hypothyroid      Hyperlipidemia      Ambulatory dysfunction      Anemia      History of BPH      CKD (chronic kidney disease)      Chronic obstructive pulmonary disease (COPD)      Bipolar disorder      HLD (hyperlipidemia)      BPH (benign prostatic hyperplasia)      Chronic diastolic congestive heart failure      Lymphedema      No significant past surgical history        Heart faliure: acute [ ] chronic [ ] acute or chronic [ ] diastolic [ ] systolic [ ] combied systolic and diastolic[ ]  SHEYLA: ATN[ ] renal medullary necrosis [ ] CKD I [ ]CKDII [ ]CKD III [ ]CKD IV [ ]CKD V [ ]Other pathological lesions [ ]  Abdominal Nutrition Status: malnutrition [ ] cachexia [ ] morbid obesity/BMI=40 [ ] Supplement ordered [___________]     T(C): 37 (12-10-22 @ 20:00), Max: 37.1 (12-10-22 @ 04:00)  HR: 87 (12-10-22 @ 23:00)  BP: 117/53 (12-10-22 @ 23:00)  BP(mean): 72 (12-10-22 @ 23:00)  ABP: --  ABP(mean): --  RR: 20 (12-10-22 @ 23:00)  SpO2: 93% (12-10-22 @ 23:00)  Wt(kg): --          12-09 @ 07:01  -  12-10 @ 07:00  --------------------------------------------------------  IN: 830 mL / OUT: 2850 mL / NET: -2020 mL          PHYSICAL EXAM:  GENERAL: NAD, resting in bed  HEAD:  Atraumatic, Normocephalic  EYES: EOMI, PERRLA, conjunctiva and sclera clear  NECK: Supple, normal appearance,  NERVOUS SYSTEM:  Alert & Oriented X2  CHEST/LUNG: Lungs clear to auscultation bilaterally, No rales, rhonchi, wheezing   HEART: Regular rate and rhythm; No murmurs, rubs, or gallops  ABDOMEN: Soft, Nontender, Nondistended; Bowel sounds present  EXTREMITIES:  Bilateral LE edema,   SKIN: Sacral wound bandage in place, posterrior R thigh ulcer bandage in place      LABS:  CBC Full  -  ( 10 Dec 2022 04:25 )  WBC Count : 12.65 K/uL  RBC Count : 2.53 M/uL  Hemoglobin : 7.5 g/dL  Hematocrit : 26.6 %  Platelet Count - Automated : 196 K/uL  Mean Cell Volume : 105.1 fl  Mean Cell Hemoglobin : 29.6 pg  Mean Cell Hemoglobin Concentration : 28.2 gm/dL  Auto Neutrophil # : x  Auto Lymphocyte # : x  Auto Monocyte # : x  Auto Eosinophil # : x  Auto Basophil # : x  Auto Neutrophil % : x  Auto Lymphocyte % : x  Auto Monocyte % : x  Auto Eosinophil % : x  Auto Basophil % : x    12-10    154<H>  |  119<H>  |  59<H>  ----------------------------<  114<H>  3.5   |  31  |  3.50<H>    Ca    9.8      10 Dec 2022 04:25  Phos  2.7     12-10  Mg     2.2     12-10    TPro  6.4  /  Alb  1.8<L>  /  TBili  0.2  /  DBili  x   /  AST  5<L>  /  ALT  9<L>  /  AlkPhos  70  12-10            RADIOLOGY & ADDITIONAL STUDIES REVIEWED:      [ ]GOALS OF CARE DISCUSSION WITH PATIENT/FAMILY/PROXY:    CRITICAL CARE TIME SPENT: 35 minutes

## 2022-12-11 NOTE — PROGRESS NOTE ADULT - ASSESSMENT
1. SHEYLA due to ATN.  First HD 12/2. s/p shiley on 12/2. dialysis started since Scr not improved and remained at 5s.  -Scr stable at 3.5mg/dl and stable last few days and last HD on saturday (12/3) with signs of renal recovery. no need HD and shiley was removed.   -Adjust meds to eGFR and avoid IV Gadolinium contrast,NSAIDs, and phosphate enema.  -Monitor I/O's daily.   -Monitor SMA daily.  2. CKD stage 4 most likely due to ischemic nephropathy  -baseline scr around 3.7mg/dL in july 2022.  -Keep patient euvolemic and renal diet  -Avoid Nephrotoxic Meds/ Agents such as (NSAIDs, IV contrast, Aminoglycosides such as gentamicin, -Gadolinium contrast, Phosphate containing enemas, etc..)  -Adjust Medications according to eGFR  3. Hypotension possible sepsis  -bp is stable and off pressor.  4. Mineral Bone Disease:  -phos is improving and PTH intact is 400.  -continue calcitriol 0.25mcg daily.  5. HAGMA and respiratory acidosis:  -abg noted.   -patient has respiratory acidosis and managed by ICU.   6. Bradycardia due to unclear etiology  -stable.  -trend troponins  -TSH is nl  7. Pulm:  -s/p intubated on 11/30 for worsening respiratory support.  -plan as per MICU.   8. Anemia of ESRD:  -iron panel and ferritin is acceptable.   -F/u CBC daily  -transfuse if HB < 7.0.  9. Encephalopathy:  -video EEG shows no seizures.    10. Hypernatremia due to water deficit and insensible losses. Pt is clinically hypervolemic.   -Na improving and continue free water 200cc 6hrs. caution with IVFs since CXR shows congestion.   -Monitor I/O's. Check Serum Na Daily. Avoid high solute intake diet and sodium bicarbonate infuse. Avoid overcorrection of NA (8-10meq/day)  10. Hypokalemia:  -give kcl 20meq once.   -monitor K.     Patient is critically ill. Time Spent: 35mins.  Discussed the assessment and plan with ICU Team/Nurse

## 2022-12-11 NOTE — PROGRESS NOTE ADULT - SUBJECTIVE AND OBJECTIVE BOX
NEPHROLOGY MEDICAL CARE, [facility] - Dr. Taj Valenzuela/ Dr. Hazel Solis/ Dr. Cosmo Root/ Dr. Fang Gifford    Patient was seen and examined at bedside.    CC: patient is doing well on HFNC    Vital Signs Last 24 Hrs  T(C): 36.1 (11 Dec 2022 08:00), Max: 37.1 (11 Dec 2022 04:00)  T(F): 97 (11 Dec 2022 08:00), Max: 98.8 (11 Dec 2022 04:00)  HR: 82 (11 Dec 2022 09:40) (80 - 95)  BP: 150/63 (11 Dec 2022 08:00) (106/54 - 150/63)  BP(mean): 88 (11 Dec 2022 08:00) (70 - 88)  RR: 19 (11 Dec 2022 08:00) (19 - 30)  SpO2: 96% (11 Dec 2022 09:40) (91% - 100%)    Parameters below as of 11 Dec 2022 08:00  Patient On (Oxygen Delivery Method): nasal cannula, high flow  O2 Flow (L/min): 40  O2 Concentration (%): 50    12-10 @ 07:01  -  12-11 @ 07:00  --------------------------------------------------------  IN: 1070 mL / OUT: 2950 mL / NET: -1880 mL    12-11 @ 07:01  -  12-11 @ 10:39  --------------------------------------------------------  IN: 712 mL / OUT: 0 mL / NET: 712 mL        PHYSICAL EXAM:  General: NAD on HFNC  Eyes: conjunctiva and sclera clear  ENMT: Atraumatic, Normocephalic;  Respiratory: Bilateral poor air entry  Cardiovascular: S1S2+; no m/r/g  Gastrointestinal: Soft, Non-tender, Nondistended; Bowel sounds present,   : daley's cath with yellowish urine.  Neuro:  AAOx2-3  Ext:  vascular skin changes of both legs; No Cyanosis  Skin: No visible rashes    MEDICATIONS:  MEDICATIONS  (STANDING):  albuterol/ipratropium for Nebulization 3 milliLiter(s) Nebulizer every 6 hours  buPROPion XL (24-Hour) . 150 milliGRAM(s) Oral daily  chlorhexidine 2% Cloths 1 Application(s) Topical <User Schedule>  collagenase Ointment 1 Application(s) Topical two times a day  doxazosin 1 milliGRAM(s) Oral at bedtime  heparin   Injectable 5000 Unit(s) SubCutaneous every 8 hours  levothyroxine 125 MICROGram(s) Oral daily  pantoprazole    Tablet 40 milliGRAM(s) Oral before breakfast  risperiDONE   Tablet 2 milliGRAM(s) Oral at bedtime  sevelamer carbonate Powder 800 milliGRAM(s) Enteral Tube three times a day  valproate sodium  IVPB 750 milliGRAM(s) IV Intermittent two times a day  zinc oxide 20% Ointment 1 Application(s) Topical two times a day    MEDICATIONS  (PRN):          LABS:                        7.9    12.48 )-----------( 228      ( 11 Dec 2022 04:30 )             28.0     12-11    151<H>  |  116<H>  |  61<H>  ----------------------------<  111<H>  3.4<L>   |  30  |  3.46<H>    Ca    10.3      11 Dec 2022 04:30  Phos  2.8     12-11  Mg     2.1     12-11    TPro  6.1  /  Alb  2.0<L>  /  TBili  0.3  /  DBili  x   /  AST  5<L>  /  ALT  8<L>  /  AlkPhos  71  12-11        Magnesium, Serum: 2.1 mg/dL (12-11 @ 04:30)  Phosphorus Level, Serum: 2.8 mg/dL (12-11 @ 04:30)    Urine studies  Osmolality, Random Urine: 301 mos/kg (12-11 @ 04:30)    PTH and Vit D:

## 2022-12-12 NOTE — PROGRESS NOTE ADULT - SUBJECTIVE AND OBJECTIVE BOX
Podiatry HPI: Patient is seen in ICU bed, no longer intubated, now verbal. He reports he is feeling well, does not have pain in his bilateral extremities. Patient has CAIR boots on and states he has them on 24/7. Denies b/l calf pain. Denies any overnight acute events. Denies constitutional symptoms.     HPI:  62 year old morbid obese male, from [facility], has past medical hx of ambulatory dysfunction, ACD, bipolar disorder, BPH, CKD, HLD, PVD,  COPD not on oxygen, hypothyroidism, RBBB, lymphedema, CHF (last echo 2/22 normal EF, GIDD) was BIBEMS for hypotension 73/37 mmHg and bradycardia (40's). Story obtained from ED provider note as patient is not able to provide any history. Patient received 500 cc NS and one dose of atropine for bradycardia. Patient very somnolent during interview but arousable to verbal stimuli.  (30 Nov 2022 18:39)    Patient admits to  (-) Fevers, (-) Chills, (-) Nausea, (-) Vomiting, (-) Shortness of Breath (-) calf pain (-) chest pain     Medications albuterol/ipratropium for Nebulization 3 milliLiter(s) Nebulizer every 6 hours  buPROPion . 75 milliGRAM(s) Oral every 12 hours  chlorhexidine 2% Cloths 1 Application(s) Topical <User Schedule>  collagenase Ointment 1 Application(s) Topical two times a day  doxazosin 1 milliGRAM(s) Oral at bedtime  heparin   Injectable 5000 Unit(s) SubCutaneous every 8 hours  levothyroxine 125 MICROGram(s) Oral daily  pantoprazole   Suspension 40 milliGRAM(s) Oral daily  risperiDONE   Tablet 2 milliGRAM(s) Oral at bedtime  sevelamer carbonate 800 milliGRAM(s) Oral every 8 hours  valproate sodium  IVPB 750 milliGRAM(s) IV Intermittent two times a day  zinc oxide 20% Ointment 1 Application(s) Topical two times a day    FHNo pertinent family history in first degree relatives    ,   PMHNo pertinent past medical history    Hypothyroid    Hyperlipidemia    Schizophrenia    Schizoaffective disorder    Ambulatory dysfunction    Anemia    History of BPH    CKD (chronic kidney disease)    Chronic obstructive pulmonary disease (COPD)    Bipolar disorder    Bipolar disorder    Mood disorder    HLD (hyperlipidemia)    BPH (benign prostatic hyperplasia)    Anemia    Chronic diastolic congestive heart failure    Lymphedema       PSHNo significant past surgical history    No significant past surgical history        Labs                          7.2    11.51 )-----------( 255      ( 12 Dec 2022 03:43 )             25.5      12-12    147<H>  |  112<H>  |  54<H>  ----------------------------<  98  3.2<L>   |  28  |  3.43<H>    Ca    10.2      12 Dec 2022 03:43  Phos  3.1     12-12  Mg     2.0     12-12    TPro  6.1  /  Alb  1.9<L>  /  TBili  0.3  /  DBili  x   /  AST  4<L>  /  ALT  10  /  AlkPhos  74  12-12     Vital Signs Last 24 Hrs  T(C): 36.6 (12 Dec 2022 08:00), Max: 37.3 (11 Dec 2022 16:00)  T(F): 97.8 (12 Dec 2022 08:00), Max: 99.1 (11 Dec 2022 16:00)  HR: 88 (12 Dec 2022 08:04) (75 - 96)  BP: 117/97 (12 Dec 2022 08:00) (99/48 - 144/68)  BP(mean): 105 (12 Dec 2022 08:00) (61 - 105)  RR: 27 (12 Dec 2022 08:00) (15 - 33)  SpO2: 96% (12 Dec 2022 08:04) (87% - 98%)    Parameters below as of 12 Dec 2022 08:00  Patient On (Oxygen Delivery Method): nasal cannula  O2 Flow (L/min): 40  O2 Concentration (%): 30          WBC Count: 11.51 K/uL *H* (12-12-22 @ 03:43)      ROS unremarkable outside of HPI            WBC Count: 15.84 K/uL *H* (12-09-22 @ 03:55)      PHYSICAL EXAM  Vasc:  DP and PT pulses non palpable due to b/l edema. TG: warm to warm. Pedal hair absent. Bilateral non pitting edema to LE.   Derm: pressure changes noted at bilateral heels. Improved appearance of bilateral extremities on exam. B/L venous stasis changes to skin with hemosiderin deposits. Peau d'orange appearance of skin due to venous stasis, no weeping on exam today. Edema is unchanged. No purulence or drainage noted at b/l legs on exam. No infected wounds on inspection of b/l lower extremity; no rash noted at b/l legs. Mild IDM noted in all interspaces. Hypertrophic and elongated toenails x 10  Neuro: unable to assess  MSK: unable to assess        A:  Chronic bilateral venous stasis with hemosiderin deposits in skin    P:   Patient evaluated and Chart reviewed  Inspected b/l extremities for signs of infection, no weeping or open wounds  Continue with IV antibiotics As Per ID  Continue wearing CAIR boots to avoid pressure ulcers as tolerated by patient  Recommend decub precautions  Podiatry to follow while in house  Outpatient nail care as needed  Discussed with Attending Dr. Navas

## 2022-12-12 NOTE — PROGRESS NOTE ADULT - ATTENDING COMMENTS
62 year old morbid obese male, from [facility], has past medical hx of ambulatory dysfunction, ACD, bipolar disorder, BPH, CKD, HLD, PVD, COPD not on oxygen, hypothyroidism, RBBB, lymphedema, CHF (last echo 2/22 normal EF, GIDD). Presented for hypotension 73/37 mmHg and bradycardia (40's). Patient admitted to ICU for shock and acute encephalopathy. Now intubated on mechanical ventilation.     DX:  - Acute hypoxic resp failure   - Shock - septic vs hypovolemia  - Bilateral lower cellulitis   - Acute encephalopathy   - Sinus bradycardia  - HAGMA   - SHEYLA on CKD  - Anemia   - COPD not on oxygen at home   - HLD   - BPH  - Hypothyroidism   - Mood disorder       Plan:  - Awake and more alert today   - CT head unremarkable   - Monitor respiratory status   - Transitioned off HFNC support to NC oxygen  - Rotated pat right side up with aggressive Chest PT and suctioning   - Completed course of antibx   - CT scan showing basilar pneumonia   - ID following   - Cultures negative   - Monitor renal function  - HD as per Neph  - Viera for urinary obstruction , failed trial of void   - Cont. home psych medications  - Cont. Levothyroxine  - DVT and stress ulcer prophylaxis.   - Transfer out of ICU today

## 2022-12-12 NOTE — CONSULT NOTE ADULT - SUBJECTIVE AND OBJECTIVE BOX
HPI:  62 year old morbid obese male, from [facility], has past medical hx of ambulatory dysfunction, ACD, bipolar disorder, BPH, CKD, HLD, PVD,  COPD not on oxygen, hypothyroidism, RBBB, lymphedema, CHF (last echo 2/22 normal EF, GIDD) was BIBEMS for hypotension 73/37 mmHg and bradycardia (40's). Story obtained from ED provider note as patient is not able to provide any history. Patient received 500 cc NS and one dose of atropine for bradycardia. Patient very somnolent during interview but arousable to verbal stimuli.  (30 Nov 2022 18:39)      PAST MEDICAL & SURGICAL HISTORY:  Hypothyroid      Hyperlipidemia      Ambulatory dysfunction      Anemia      History of BPH      CKD (chronic kidney disease)      Chronic obstructive pulmonary disease (COPD)      Bipolar disorder      HLD (hyperlipidemia)      BPH (benign prostatic hyperplasia)      Chronic diastolic congestive heart failure      Lymphedema      Chronic leg pain    ALLERGIES:  No Known Allergies    Social Hx: SMOKER    FAMILY HISTORY: REVIEWED AND NONCONTRIBUTORY    MEDICATION:  acetaminophen     Tablet .. 975 milliGRAM(s) Oral every 6 hours PRN  buPROPion . 75 milliGRAM(s) Oral every 12 hours  chlorhexidine 2% Cloths 1 Application(s) Topical <User Schedule>  collagenase Ointment 1 Application(s) Topical two times a day  doxazosin 1 milliGRAM(s) Oral at bedtime  heparin   Injectable 5000 Unit(s) SubCutaneous every 8 hours  levothyroxine 125 MICROGram(s) Oral daily  pantoprazole   Suspension 40 milliGRAM(s) Oral daily  risperiDONE   Tablet 2 milliGRAM(s) Oral at bedtime  sevelamer carbonate Powder 800 milliGRAM(s) Oral three times a day with meals  simethicone 80 milliGRAM(s) Chew two times a day  valproic  acid Syrup 500 milliGRAM(s) Oral three times a day  zinc oxide 20% Ointment 1 Application(s) Topical two times a day      MEDICATIONS  (PRN):  acetaminophen     Tablet .. 975 milliGRAM(s) Oral every 6 hours PRN Temp greater or equal to 38C (100.4F), Moderate Pain (4 - 6), Severe Pain (7 - 10)      T(C): 36.6 (12-15-22 @ 04:49), Max: 36.8 (12-14-22 @ 23:38)  HR: 77 (12-15-22 @ 12:00) (72 - 83)  BP: 103/50 (12-15-22 @ 12:00) (84/46 - 138/71)  RR: 25 (12-15-22 @ 12:00) (18 - 25)  SpO2: 98% (12-15-22 @ 12:00) (88% - 99%)        REVIEW OF SYSTEMS:                           ALL ROS DONE [ X   ]    CONSTITUTIONAL:  LETHARGIC [   ], FEVER [   ], UNRESPONSIVE [   ]  CVS:  CP  [   ], SOB, [   ], PALPITATIONS [   ], DIZZYNESS [   ]  RS: COUGH [   ], SPUTUM [   ]  GI: ABDOMINAL PAIN [   ], NAUSEA [   ], VOMITINGS [   ], DIARRHEA [   ], CONSTIPATION [   ]  :  DYSURIA [   ], NOCTURIA [   ], INCREASED FREQUENCY [   ], DRIBLING [   ],  SKELETAL: PAINFUL JOINTS [   ], SWOLLEN JOINTS [   ], NECK ACHE [   ], LOW BACK ACHE [   ],  SKIN : ULCERS [   ], RASH [   ], ITCHING [   ]  CNS: HEAD ACHE [   ], DOUBLE VISION [   ], BLURRED VISION [   ], AMS / CONFUSION [   ], SEIZURES [   ], WEAKNESS [   ],TINGLING / NUMBNESS [   ]    PHYSICAL EXAMINATION:  GENERAL APPEARANCE: NO DISTRESS  HEENT:  NO PALLOR, NO  JVD,  NO   NODES, NECK SUPPLE  CVS: S1 +, S2 +,   RS: AEEB,  OCCASIONAL  RALES +,   NO RONCHI  ABD: SOFT, NT, NO, BS +  EXT: PE +  SKIN: WARM,   SKELETAL:  ROM ACCEPTABLE  CNS:  AAO X 2    RADIOLOGY :    REVIEWED    ASSESSMENT :       PLAN:  HPI:  62 year old morbid obese male, from [facility], has past medical hx of ambulatory dysfunction, ACD, bipolar disorder, BPH, CKD, HLD, PVD,  COPD not on oxygen, hypothyroidism, RBBB, lymphedema, CHF (last echo 2/22 normal EF, GIDD) was BIBEMS for hypotension 73/37 mmHg and bradycardia (40's). Story obtained from ED provider note as patient is not able to provide any history. Patient received 500 cc NS and one dose of atropine for bradycardia. Patient very somnolent during interview but arousable to verbal stimuli.  (30 Nov 2022 18:39)      # SEPTIC SHOCK - S/T CELLULITIS OF LOWER EXTREMITY  - S/P IVF, S/P VASOPRESSORS  - S/P ROCEPHIN  - ID CONSULT  - CRITICAL CARE CONSULT    # ACUTE ON CHRONIC HYPOXIC RESPIRATORY FAILURE S/T PULMONARY EDEMA - IMPROVED  # UNDERLYING COPD  - ON PRN O2  - S/P HIGH FLOW NASAL CANNULA  - S/P CHEST PT  - CRITICAL CARE EVALUATION    # ACUTE METABOLIC ENCEPHALOPATHY S/T ? UREMIA  # HX OF SEIZURE DX, W/ QUESTIONABLE SPASMODIC MOVEMENT - RESOLVED  - S/P EEG - NO EPILEPTIFORM PATTERN  - ON VALPROIC ACID  - NEUROLOGY CONSULT  - CRITICAL CARE CONSULT    # SHEYLA ON CKD   # URINARY RETENTION, BPH  - HATFIELD PLACED  - STRICT IS AND OS  - AVOID NEPHROTOXIC AGENTS  - MONITOR CR  - PATIENT REQUIRED SHILEY PLACEMENT AND HD ON 12/2 AND 12/3  - NOW S/P EMERGENT HD  - NEPHROLOGY CONSULT  - UROLOGY CONSULT    # AMBULATORY DYSFUNCTION, IMPAIRED GAIT S/T OA, PVD, PERIPHERAL NEUROPATHY  - NOTED PT EVAL - TG    # SINUS BRADYCARDIA  - TTE - NORMAL LV EF    # HAGMA - RESOLVED, NEPHROLOGY CONSULT IN PROGRESS    # HYPERNATREMIA - SUSPECTED HYPERVOLEMIC HYPERNATREMIA - ENCOURAGED PO FREE WATER INTAKE - RESOLVED    # DYSPHAGIA - ON MODIFIED DIET PER SWALLOW EVAL    # ANEMIA OF CHRONIC DISEASE - MONITOR HGB, TRANSFUSION THRESHOLD HGB < 8    # HYPOTHYROIDISM - ON LEVOTHYROXINE    # PRESSURE ULCERS  - PATIENT IS HIGH RISK FOR PRESSURE ULCERS DESPITE PREVENTIVE MEASURES IN PLACE  - WOUND CARE CONSULT    # GI AND DVT PPX

## 2022-12-12 NOTE — PROGRESS NOTE ADULT - ASSESSMENT
62 year old morbid obese male, from [facility], has past medical hx of ambulatory dysfunction, ACD, bipolar disorder, BPH, CKD, HLD, PVD, COPD not on oxygen, hypothyroidism, RBBB, lymphedema, CHF (last echo 2/22 normal EF, GIDD) was BIBEMS for hypotension 73/37 mmHg and bradycardia (40's). Patient admitted to ICU for shock and acute encephalopathy.     DX:  - Shock septic vs hypovolemia  - LE cellulitis   - Acute encephalopathy   - Sinus bradycardia  - HAGMA   - SHEYLA on CKD  - Anemia   - COPD not on oxygen at home   - HLD   - BPH  - Hypothyroidism   - Mood disorder       =================== Neuro============================  #Acute Encephalopathy; likely in the setting of acidemia, infection, uremia    Lethargic, fighting vent    Pain meds with Tylenol   CT Head negative for acute bleed/swelling  Awake and alert X2     #Seizure  - Home med includes valproic acid  - Patient is having repeated muscle spasms, improved on Versed but not fully stopped  - Still having muscle movements after dialysis which brought down BUN  - Started propofol instead of precedex  - Valproic acid level low, likely due to missed doses when patient was originally admitted.  - DC keppra per neuro recs, prefer valproate as less nephrotoxic  - spot EEG 12/3 - moderate generalized slowing  - 24 hr EEG (12/4)-no change, No epileptiform pattern or seizure seen.  - Valproic Acid to 750mg BID    ================= Cardiovascular==========================  Sinus bradycardia  - EKG sinus bradycardia, old RBBB  - TTE 12/1 - LVEF 60-65%  - trops neg  - Given normal EF, no changes in EKG, cardiogenic shock less likely    Hypotension  - off pressers      ================- Pulm=================================  COPD  - Patient with hx of COPD not in exacerbation   - Chest X ray Showed bilateral patchy infiltrates, no concern for fluid overload    - CXR 12/7 showed severe R Atelectasis, likely due to mucous plugging, chest PT, R side elevated  -CXR 12/7 3PM showed improvement with chest PT  -CXR 12/8 showed worsening edema, fluids stopped,   - Patient on high flow  - Oxygen supplementation as needed to maintain SpO2 of 88-92%      ==================ID===================================  Shock  - Concern for sepsis vs hypovolemic   - Empirical treatment with CTX for LE cellulitis and for pseudomonal coverage  - S/p 4L NS   - Started on pressors   - Off Pressors  F/u lactate 1.4, procalcitonin, Bcx, Ucx, CK NGTD  - ID consulted Dr Nolasco   - Completed CTX    ================= Nephro================================  SHEYLA on CKD  - On admission, patient with Cr 6.4>>5.6, baseline 3.7; stable at baseline today  - Worsening CKD most likely a combination of obstructive vs pre- renal   - Viera catheter placed in ED with 1000 cc of UO  - -58mL in the past 24 hours  - Avoid nephrotoxins, NSAIDS, ACEI and ARBS  - Monitor BMP daily  - Nephro Dr Valenzuela consulted  - S/p Dialysis 12/2 and 12/3, Creatnine near baseline, mental status improved  - Patient's renal function returned to baseline    HAGMA  - Most likely from uremia   - Nephro Dr Valenzuela consulted  - Resolved    Hypernatremia  Likely hypervolemic hypernatremia, given fluid overload, and patient's refractory Na secondary to fluid.  c/w Metolazone trial 5mg to induce Na excretion      =================GI====================================  Pureed Diet  Resume psych PO medications- Wellbutrin  Swallow evaluation  BM today, regular    ================ Heme==================================  Anemia   - Anemia of chronic disease   - Monitor CBC daily   - Hgb Stable no signs of active bleed    =================Endocrine===============================  Hypothyroidism  - F/u TSH Normal   - C/w home meds when not NPO     ================= Skin/Catheters============================  Peripheral IV lines   Viera catheter    Sacral and Posterior Thigh Ulcers, Wound Care Consulted    =================Prophylaxis =============================  DVT prophylaxis with Heparin SQ daily   GI prophylaxis with PPI daily    ==================GOC==================================  FULL CODE    62 year old morbid obese male, from [facility], has past medical hx of ambulatory dysfunction, ACD, bipolar disorder, BPH, CKD, HLD, PVD, COPD not on oxygen, hypothyroidism, RBBB, lymphedema, CHF (last echo 2/22 normal EF, GIDD) was BIBEMS for hypotension 73/37 mmHg and bradycardia (40's). Patient admitted to ICU for shock and acute encephalopathy.     DX:  - Shock septic vs hypovolemia  - LE cellulitis   - Acute encephalopathy   - Sinus bradycardia  - HAGMA   - SHEYLA on CKD  - Anemia   - COPD not on oxygen at home   - HLD   - BPH  - Hypothyroidism   - Mood disorder       =================== Neuro============================  #Acute Encephalopathy; likely in the setting of acidemia, infection, uremia    Lethargic, fighting vent    Pain meds with Tylenol   CT Head negative for acute bleed/swelling  Awake and alert X3 back at baseline    #Seizure  - Home med includes valproic acid  - Patient is having repeated muscle spasms, improved on Versed but not fully stopped  - Still having muscle movements after dialysis which brought down BUN  - Started propofol instead of precedex  - Valproic acid level low, likely due to missed doses when patient was originally admitted.  - DC keppra per neuro recs, prefer valproate as less nephrotoxic  - spot EEG 12/3 - moderate generalized slowing  - 24 hr EEG (12/4)-no change, No epileptiform pattern or seizure seen.  - Valproic Acid to 750mg BID    ================= Cardiovascular==========================  Sinus bradycardia  - EKG sinus bradycardia, old RBBB  - TTE 12/1 - LVEF 60-65%  - trops neg  - Given normal EF, no changes in EKG, cardiogenic shock less likely    Hypotension  - off pressers      ================- Pulm=================================  COPD  - Patient with hx of COPD not in exacerbation   - Chest X ray Showed bilateral patchy infiltrates, no concern for fluid overload    - CXR 12/7 showed severe R Atelectasis, likely due to mucous plugging, chest PT, R side elevated  -CXR 12/7 3PM showed improvement with chest PT  -CXR 12/8 showed worsening edema, fluids stopped,   - Patient on high flow  - Oxygen supplementation as needed to maintain SpO2 of 88-92%      ==================ID===================================  Shock  - Concern for sepsis vs hypovolemic   - Empirical treatment with CTX for LE cellulitis and for pseudomonal coverage  - S/p 4L NS   - Started on pressors   - Off Pressors  F/u lactate 1.4, procalcitonin, Bcx, Ucx, CK NGTD  - ID consulted Dr Nolasco   - Completed CTX    ================= Nephro================================  SHEYLA on CKD  - On admission, patient with Cr 6.4>>5.6, baseline 3.7; stable at baseline today  - Worsening CKD most likely a combination of obstructive vs pre- renal   - Viera catheter placed in ED with 1000 cc of UO  - -58mL in the past 24 hours  - Avoid nephrotoxins, NSAIDS, ACEI and ARBS  - Monitor BMP daily  - Nephro Dr Valenzuela consulted  - S/p Dialysis 12/2 and 12/3, Creatnine near baseline, mental status improved  - Patient's renal function returned to baseline    HAGMA  - Most likely from uremia   - Nephro Dr Valenzuela consulted  - Resolved    Hypernatremia  Likely hypervolemic hypernatremia, given fluid overload, and patient's refractory Na secondary to fluid.  Encourage PO water intake      =================GI====================================  Pureed Diet  Resume psych PO medications- Wellbutrin  Swallow evaluation  BM today, regular    ================ Heme==================================  Anemia   - Anemia of chronic disease   - Monitor CBC daily   - Hgb Stable no signs of active bleed    =================Endocrine===============================  Hypothyroidism  - On Levothyroxine    ================= Skin/Catheters============================  Peripheral IV lines   Viera catheter  TOV failed; to retry on floors  Sacral and Posterior Thigh Ulcers, Wound Care Consulted    =================Prophylaxis =============================  DVT prophylaxis with Heparin SQ daily   GI prophylaxis with PPI daily    ==================GOC==================================  FULL CODE

## 2022-12-12 NOTE — PROGRESS NOTE ADULT - SUBJECTIVE AND OBJECTIVE BOX
NEPHROLOGY MEDICAL CARE, [facility] - Dr. Taj Valenzuela/ Dr. Hazel Solis/ Dr. Cosmo Root/ Dr. Fang Gifford    Patient was seen and examined at bedside.    CC: patient is okay and NAD    Vital Signs Last 24 Hrs  T(C): 36.6 (12 Dec 2022 08:00), Max: 37.3 (11 Dec 2022 16:00)  T(F): 97.8 (12 Dec 2022 08:00), Max: 99.1 (11 Dec 2022 16:00)  HR: 86 (12 Dec 2022 12:00) (75 - 95)  BP: 104/56 (12 Dec 2022 12:00) (90/40 - 144/68)  BP(mean): 68 (12 Dec 2022 12:00) (55 - 105)  RR: 22 (12 Dec 2022 12:00) (15 - 33)  SpO2: 99% (12 Dec 2022 12:00) (87% - 99%)    Parameters below as of 12 Dec 2022 08:00  Patient On (Oxygen Delivery Method): nasal cannula  O2 Flow (L/min): 40  O2 Concentration (%): 30    12-11 @ 07:01  -  12-12 @ 07:00  --------------------------------------------------------  IN: 2564 mL / OUT: 4525 mL / NET: -1961 mL        PHYSICAL EXAM:  General: NAD on NC  Eyes: conjunctiva and sclera clear  ENMT: Atraumatic, Normocephalic;  Respiratory: Bilateral poor air entry  Cardiovascular: S1S2+; no m/r/g  Gastrointestinal: Soft, Non-tender, Nondistended; Bowel sounds present,   : daley's cath with yellowish urine.  Neuro:  AAOx2-3  Ext:  vascular skin changes of both legs; No Cyanosis  Skin: No visible rashes    MEDICATIONS:  MEDICATIONS  (STANDING):  albuterol/ipratropium for Nebulization 3 milliLiter(s) Nebulizer every 6 hours  buPROPion . 75 milliGRAM(s) Oral every 12 hours  chlorhexidine 2% Cloths 1 Application(s) Topical <User Schedule>  collagenase Ointment 1 Application(s) Topical two times a day  doxazosin 1 milliGRAM(s) Oral at bedtime  heparin   Injectable 5000 Unit(s) SubCutaneous every 8 hours  levothyroxine 125 MICROGram(s) Oral daily  pantoprazole   Suspension 40 milliGRAM(s) Oral daily  risperiDONE   Tablet 2 milliGRAM(s) Oral at bedtime  sevelamer carbonate 800 milliGRAM(s) Oral every 8 hours  valproate sodium  IVPB 750 milliGRAM(s) IV Intermittent two times a day  zinc oxide 20% Ointment 1 Application(s) Topical two times a day    MEDICATIONS  (PRN):          LABS:                        7.2    11.51 )-----------( 255      ( 12 Dec 2022 03:43 )             25.5     12-12    147<H>  |  112<H>  |  54<H>  ----------------------------<  98  3.2<L>   |  28  |  3.43<H>    Ca    10.2      12 Dec 2022 03:43  Phos  3.1     12-12  Mg     2.0     12-12    TPro  6.1  /  Alb  1.9<L>  /  TBili  0.3  /  DBili  x   /  AST  4<L>  /  ALT  10  /  AlkPhos  74  12-12        Magnesium, Serum: 2.0 mg/dL (12-12 @ 03:43)  Phosphorus Level, Serum: 3.1 mg/dL (12-12 @ 03:43)    Urine studies  Osmolality, Random Urine: 301 mos/kg (12-11 @ 04:30)    PTH and Vit D:

## 2022-12-12 NOTE — PROGRESS NOTE ADULT - ASSESSMENT
1. SHEYLA due to ATN.  First HD 12/2. s/p shiley on 12/2. dialysis started since Scr not improved and remained at 5s.  -Scr stable at 3.5mg/dl and last HD on saturday (12/3 and 12/2) with signs of renal recovery.  -Adjust meds to eGFR and avoid IV Gadolinium contrast,NSAIDs, and phosphate enema.  -Monitor I/O's daily.   -Monitor SMA daily.  2. CKD stage 4 most likely due to ischemic nephropathy  -baseline scr around 3.7mg/dL in july 2022.  -Keep patient euvolemic and renal diet  -Avoid Nephrotoxic Meds/ Agents such as (NSAIDs, IV contrast, Aminoglycosides such as gentamicin, -Gadolinium contrast, Phosphate containing enemas, etc..)  -Adjust Medications according to eGFR  3. Hypotension possible sepsis  -bp is stable and off pressor.  4. Mineral Bone Disease:  -phos is improving and PTH intact is 400.  -continue calcitriol 0.25mcg daily.  5. HAGMA and respiratory acidosis:  -abg noted.   -patient has respiratory acidosis and managed by ICU.   6. Bradycardia due to unclear etiology  -stable.  -TSH is nl  7. Pulm:  -s/p intubated on 11/30 for worsening respiratory support and extubated.  -plan as per MICU.   8. Anemia of ESRD:  -iron panel and ferritin is acceptable.   -F/u CBC daily  -transfuse if HB < 7.0.  9. Encephalopathy:  -video EEG shows no seizures.    10. Hypernatremia due to water deficit and insensible losses. Pt is clinically hypervolemic.   -Na improving and continue free water 200cc 6hrs. caution with IVFs since CXR shows congestion.   -Monitor I/O's. Check Serum Na Daily. Avoid high solute intake diet and sodium bicarbonate infuse. Avoid overcorrection of NA (8-10meq/day)  10. Hypokalemia:  -give kcl 20meq once.   -monitor K.     Patient is critically ill. Time Spent: 35mins.  Discussed the assessment and plan with ICU Team/Nurse

## 2022-12-12 NOTE — PROGRESS NOTE ADULT - SUBJECTIVE AND OBJECTIVE BOX
INTERVAL HPI/OVERNIGHT EVENTS:     PRESSORS: [ ] YES [ ] NO  WHICH:    ANTIBIOTICS:                  DATE STARTED:  ANTIBIOTICS:                  DATE STARTED:  ANTIBIOTICS:                  DATE STARTED:    Antimicrobial:    Cardiovascular:  doxazosin 1 milliGRAM(s) Oral at bedtime    Pulmonary:  albuterol/ipratropium for Nebulization 3 milliLiter(s) Nebulizer every 6 hours    Hematalogic:  heparin   Injectable 5000 Unit(s) SubCutaneous every 8 hours    Other:  buPROPion . 75 milliGRAM(s) Oral every 12 hours  chlorhexidine 2% Cloths 1 Application(s) Topical <User Schedule>  collagenase Ointment 1 Application(s) Topical two times a day  levothyroxine 125 MICROGram(s) Oral daily  pantoprazole   Suspension 40 milliGRAM(s) Oral daily  risperiDONE   Tablet 2 milliGRAM(s) Oral at bedtime  sevelamer carbonate 800 milliGRAM(s) Oral every 8 hours  valproate sodium  IVPB 750 milliGRAM(s) IV Intermittent two times a day  zinc oxide 20% Ointment 1 Application(s) Topical two times a day      Drug Dosing Weight  Height (cm): 177.8 (30 Nov 2022 18:45)  Weight (kg): 113.9 (30 Nov 2022 18:45)  BMI (kg/m2): 36 (30 Nov 2022 18:45)  BSA (m2): 2.3 (30 Nov 2022 18:45)    CENTRAL LINE: [ ] YES [ ] NO  LOCATION:   DATE INSERTED:  REMOVE: [ ] YES [ ] NO  EXPLAIN:    HATFIELD: [ ] YES [ ] NO    DATE INSERTED:  REMOVE:  [ ] YES [ ] NO  EXPLAIN:    A-LINE:  [ ] YES [ ] NO  LOCATION:   DATE INSERTED:  REMOVE:  [ ] YES [ ] NO  EXPLAIN:    PMH/Social Hx/Fam Hx -reviewed admission note, no change since admission  PAST MEDICAL & SURGICAL HISTORY:  Hypothyroid      Hyperlipidemia      Ambulatory dysfunction      Anemia      History of BPH      CKD (chronic kidney disease)      Chronic obstructive pulmonary disease (COPD)      Bipolar disorder      HLD (hyperlipidemia)      BPH (benign prostatic hyperplasia)      Chronic diastolic congestive heart failure      Lymphedema      No significant past surgical history        Heart faliure: acute [ ] chronic [ ] acute or chronic [ ] diastolic [ ] systolic [ ] combied systolic and diastolic[ ]  SHEYLA: ATN[ ] renal medullary necrosis [ ] CKD I [ ]CKDII [ ]CKD III [ ]CKD IV [ ]CKD V [ ]Other pathological lesions [ ]  Abdominal Nutrition Status: malnutrition [ ] cachexia [ ] morbid obesity/BMI=40 [ ] Supplement ordered [___________]     T(C): 36.6 (12-12-22 @ 08:00), Max: 37.3 (12-11-22 @ 16:00)  HR: 88 (12-12-22 @ 08:04)  BP: 117/97 (12-12-22 @ 08:00)  BP(mean): 105 (12-12-22 @ 08:00)  ABP: --  ABP(mean): --  RR: 27 (12-12-22 @ 08:00)  SpO2: 96% (12-12-22 @ 08:04)  Wt(kg): --          12-11 @ 07:01  -  12-12 @ 07:00  --------------------------------------------------------  IN: 2564 mL / OUT: 4525 mL / NET: -1961 mL            PHYSICAL EXAM:    GENERAL: [ ]NAD, [ ]well-groomed, [ ]well-developed  HEAD:  [ ]Atraumatic, [ ]Normocephalic  EYES: [ ]EOMI, [ ]PERRLA, [ ]conjunctiva and sclera clear  ENMT: [ ]No tonsillar erythema, exudates, or enlargement; [ ]Moist mucous membranes, [ ]Good dentition, [ ]No lesions  NECK: [ ]Supple, normal appearance, [ ]No JVD; [ ]Normal thyroid; [ ]Trachea midline  NERVOUS SYSTEM:  [ ]Alert & Oriented X3, [ ]Good concentration; [ ]Motor Strength 5/5 B/L upper and lower extremities; [ ]DTRs 2+ intact and symmetric  CHEST/LUNG: [ ]No chest deformity; [ ]Normal percussion bilaterally; [ ]No rales, rhonchi, wheezing; [ ]Crackles at bases  HEART: [ ]Regular rate and rhythm; [ ]No murmurs, rubs, or gallops  ABDOMEN: [ ]Soft, Nontender, Nondistended; [ ]Bowel sounds present  EXTREMITIES:  [ ]2+ Peripheral Pulses, [ ]No clubbing, cyanosis, or edema [ ]Bilat lower extremity edema  LYMPH: [ ]No lymphadenopathy noted  SKIN: [ ]No rashes or lesions; [ ]Good capillary refill      LABS:  CBC Full  -  ( 12 Dec 2022 03:43 )  WBC Count : 11.51 K/uL  RBC Count : 2.43 M/uL  Hemoglobin : 7.2 g/dL  Hematocrit : 25.5 %  Platelet Count - Automated : 255 K/uL  Mean Cell Volume : 104.9 fl  Mean Cell Hemoglobin : 29.6 pg  Mean Cell Hemoglobin Concentration : 28.2 gm/dL  Auto Neutrophil # : x  Auto Lymphocyte # : x  Auto Monocyte # : x  Auto Eosinophil # : x  Auto Basophil # : x  Auto Neutrophil % : x  Auto Lymphocyte % : x  Auto Monocyte % : x  Auto Eosinophil % : x  Auto Basophil % : x    12-12    147<H>  |  112<H>  |  54<H>  ----------------------------<  98  3.2<L>   |  28  |  3.43<H>    Ca    10.2      12 Dec 2022 03:43  Phos  3.1     12-12  Mg     2.0     12-12    TPro  6.1  /  Alb  1.9<L>  /  TBili  0.3  /  DBili  x   /  AST  4<L>  /  ALT  10  /  AlkPhos  74  12-12            RADIOLOGY & ADDITIONAL STUDIES REVIEWED:      [ ]GOALS OF CARE DISCUSSION WITH PATIENT/FAMILY/PROXY:    CRITICAL CARE TIME SPENT: 35 minutes INTERVAL HPI/OVERNIGHT EVENTS: Transitioned to NC    Antimicrobial:    Cardiovascular:  doxazosin 1 milliGRAM(s) Oral at bedtime    Pulmonary:  albuterol/ipratropium for Nebulization 3 milliLiter(s) Nebulizer every 6 hours    Hematalogic:  heparin   Injectable 5000 Unit(s) SubCutaneous every 8 hours    Other:  buPROPion . 75 milliGRAM(s) Oral every 12 hours  chlorhexidine 2% Cloths 1 Application(s) Topical <User Schedule>  collagenase Ointment 1 Application(s) Topical two times a day  levothyroxine 125 MICROGram(s) Oral daily  pantoprazole   Suspension 40 milliGRAM(s) Oral daily  risperiDONE   Tablet 2 milliGRAM(s) Oral at bedtime  sevelamer carbonate 800 milliGRAM(s) Oral every 8 hours  valproate sodium  IVPB 750 milliGRAM(s) IV Intermittent two times a day  zinc oxide 20% Ointment 1 Application(s) Topical two times a day      Drug Dosing Weight  Height (cm): 177.8 (30 Nov 2022 18:45)  Weight (kg): 113.9 (30 Nov 2022 18:45)  BMI (kg/m2): 36 (30 Nov 2022 18:45)  BSA (m2): 2.3 (30 Nov 2022 18:45)    PMH/Social Hx/Fam Hx -reviewed admission note, no change since admission  PAST MEDICAL & SURGICAL HISTORY:  Hypothyroid      Hyperlipidemia      Ambulatory dysfunction      Anemia      History of BPH      CKD (chronic kidney disease)      Chronic obstructive pulmonary disease (COPD)      Bipolar disorder      HLD (hyperlipidemia)      BPH (benign prostatic hyperplasia)      Chronic diastolic congestive heart failure      Lymphedema      No significant past surgical history        Heart faliure: acute [ ] chronic [ ] acute or chronic [ ] diastolic [ ] systolic [ ] combied systolic and diastolic[ ]  SHEYLA: ATN[ ] renal medullary necrosis [ ] CKD I [ ]CKDII [ ]CKD III [ ]CKD IV [ ]CKD V [ ]Other pathological lesions [ ]  Abdominal Nutrition Status: malnutrition [ ] cachexia [ ] morbid obesity/BMI=40 [ ] Supplement ordered [___________]     T(C): 36.6 (12-12-22 @ 08:00), Max: 37.3 (12-11-22 @ 16:00)  HR: 88 (12-12-22 @ 08:04)  BP: 117/97 (12-12-22 @ 08:00)  BP(mean): 105 (12-12-22 @ 08:00)  ABP: --  ABP(mean): --  RR: 27 (12-12-22 @ 08:00)  SpO2: 96% (12-12-22 @ 08:04)  Wt(kg): --          12-11 @ 07:01  -  12-12 @ 07:00  --------------------------------------------------------  IN: 2564 mL / OUT: 4525 mL / NET: -1961 mL            PHYSICAL EXAM:  GENERAL: NAD, resting comfortably in bed  HEAD:  Atraumatic, Normocephalic  EYES: EOMI, PERRLA, conjunctiva and sclera clear  NECK: Supple, normal appearance,  NERVOUS SYSTEM:  Alert & Oriented X3,  Motor Strength 5/5 B/L upper and lower extremities, sensation intact  CHEST/LUNG: Lungs clear to auscultation bilaterally, No rales, rhonchi, wheezing   HEART: Regular rate and rhythm; No murmurs, rubs, or gallops  ABDOMEN: Soft, Nontender, Nondistended; Bowel sounds present  EXTREMITIES:  Bilateral lower extremity edema, no changes in erythema or redness        LABS:  CBC Full  -  ( 12 Dec 2022 03:43 )  WBC Count : 11.51 K/uL  RBC Count : 2.43 M/uL  Hemoglobin : 7.2 g/dL  Hematocrit : 25.5 %  Platelet Count - Automated : 255 K/uL  Mean Cell Volume : 104.9 fl  Mean Cell Hemoglobin : 29.6 pg  Mean Cell Hemoglobin Concentration : 28.2 gm/dL  Auto Neutrophil # : x  Auto Lymphocyte # : x  Auto Monocyte # : x  Auto Eosinophil # : x  Auto Basophil # : x  Auto Neutrophil % : x  Auto Lymphocyte % : x  Auto Monocyte % : x  Auto Eosinophil % : x  Auto Basophil % : x    12-12    147<H>  |  112<H>  |  54<H>  ----------------------------<  98  3.2<L>   |  28  |  3.43<H>    Ca    10.2      12 Dec 2022 03:43  Phos  3.1     12-12  Mg     2.0     12-12    TPro  6.1  /  Alb  1.9<L>  /  TBili  0.3  /  DBili  x   /  AST  4<L>  /  ALT  10  /  AlkPhos  74  12-12            RADIOLOGY & ADDITIONAL STUDIES REVIEWED:      [ ]GOALS OF CARE DISCUSSION WITH PATIENT/FAMILY/PROXY:    CRITICAL CARE TIME SPENT: 35 minutes

## 2022-12-13 NOTE — CHART NOTE - NSCHARTNOTEFT_GEN_A_CORE
Assessment:     Factors impacting intake: [ ] none [ ] nausea  [ ] vomiting [ ] diarrhea [ ] constipation  [ ]chewing problems [ ] swallowing issues  [ ] other:     Diet Presciption: Diet, Pureed:   Consistent Carbohydrate {Evening Snacks}  DASH/TLC {Sodium & Cholesterol Restricted}  Mildly Thick Liquids (MILDTHICKLIQS)  For patients receiving Renal Replacement - No Protein Restr, No Conc K, No Conc Phos, Low Sodium (RENAL) (22 @ 11:42)    Intake:     Daily Weight in k.5 (12 Dec 2022 08:00)  Weight in k.1 (11 Dec 2022 08:00)  Weight in k.2 (10 Dec 2022 07:00)  Weight in k.7 (09 Dec 2022 07:00)  Weight in k.9 (08 Dec 2022 08:00)  Weight in k.3 (07 Dec 2022 07:00)    % Weight Change    Pertinent Medications: MEDICATIONS  (STANDING):  albuterol/ipratropium for Nebulization 3 milliLiter(s) Nebulizer every 6 hours  buPROPion . 75 milliGRAM(s) Oral every 12 hours  chlorhexidine 2% Cloths 1 Application(s) Topical <User Schedule>  collagenase Ointment 1 Application(s) Topical two times a day  doxazosin 1 milliGRAM(s) Oral at bedtime  heparin   Injectable 5000 Unit(s) SubCutaneous every 8 hours  levothyroxine 125 MICROGram(s) Oral daily  pantoprazole   Suspension 40 milliGRAM(s) Oral daily  risperiDONE   Tablet 2 milliGRAM(s) Oral at bedtime  sevelamer carbonate Powder 800 milliGRAM(s) Oral three times a day with meals  valproate sodium  IVPB 750 milliGRAM(s) IV Intermittent two times a day  zinc oxide 20% Ointment 1 Application(s) Topical two times a day    MEDICATIONS  (PRN):    Pertinent Labs:  Na146 mmol/L<H> Glu 99 mg/dL K+ 3.4 mmol/L<L> Cr  3.65 mg/dL<H> BUN 53 mg/dL<H>  Phos 4.1 mg/dL 12 Alb 1.9 g/dL<L>     CAPILLARY BLOOD GLUCOSE      POCT Blood Glucose.: 116 mg/dL (12 Dec 2022 16:59)  POCT Blood Glucose.: 109 mg/dL (12 Dec 2022 12:41)      Skin:     Estimated Needs:   [ ] no change since previous assessment  [ ] recalculated:     Previous Nutrition Diagnosis:   [ ] Inadequate Energy Intake [ ]Inadequate Oral Intake [ ] Excessive Energy Intake   [ ] Underweight [ ] Increased Nutrient Needs [ ] Overweight/Obesity  [ ] Swallowing Difficult   [ ] Altered GI Function [ ] Unintended Weight Loss [ ] Food & Nutrition Related Knowledge Deficit [ ] Malnutrition   [ ] Not Ready for Diet/Life Style Changes     Nutrition Diagnosis is [ ] ongoing  [ ] Improving   [ ] resolved [ ] not applicable     New Nutrition Diagnosis: [ ] not applicable       Interventions:   Recommend  [ ] Change Diet To:  [ ] Nutrition Supplement  [ ] Nutrition Support  [ ] Other:     Monitoring and Evaluation:   [ ] PO intake [ x ] Tolerance to diet prescription [ x ] weights [ x ] labs[ x ] follow up per protocol  [ ] other: Assessment:      Nutrition reassessment for follow-up. Chart reviewed, pt visited in ICU, asleep, confused per chart; s/p NGT TF, po diet started with swallow evaluation 22: puree food, mildly thicken liquid recommended, tolerating meals well, 100% intake per nursing; SHEYLA, ATN, CkD, s/p a few session of HD, Nephrolgoist on the case, sign of renal recovery, continue renal diet per MD     Factors impacting intake: [ X ] swallowing issues [ X ] acute on chronic comorbidities including shock, acute encephalopathy, SHEYLA on CKD     Diet Prescription:   Diet, Pureed:   Consistent Carbohydrate {Evening Snacks}  DASH/TLC {Sodium & Cholesterol Restricted}  Mildly Thick Liquids (MILDTHICKLIQS)  For patients receiving Renal Replacement - No Protein Restr, No Conc K, No Conc Phos, Low Sodium (RENAL) (22 @ 11:42)    Intake:  see above     Daily Weight in k.5 (12 Dec 2022 08:00)  Weight in k.1 (11 Dec 2022 08:00)  Weight in k.2 (10 Dec 2022 07:00)  Weight in k.7 (09 Dec 2022 07:00)  Weight in k.9 (08 Dec 2022 08:00)  Weight in k.3 (07 Dec 2022 07:00)    % Weight Change:  a bit fluctuated with downward trend, may due to scale/fluid variance    Pertinent Medications: MEDICATIONS  (STANDING):  albuterol/ipratropium for Nebulization 3 milliLiter(s) Nebulizer every 6 hours  buPROPion . 75 milliGRAM(s) Oral every 12 hours  chlorhexidine 2% Cloths 1 Application(s) Topical <User Schedule>  collagenase Ointment 1 Application(s) Topical two times a day  doxazosin 1 milliGRAM(s) Oral at bedtime  heparin   Injectable 5000 Unit(s) SubCutaneous every 8 hours  levothyroxine 125 MICROGram(s) Oral daily  pantoprazole   Suspension 40 milliGRAM(s) Oral daily  risperiDONE   Tablet 2 milliGRAM(s) Oral at bedtime  sevelamer carbonate Powder 800 milliGRAM(s) Oral three times a day with meals  valproate sodium  IVPB 750 milliGRAM(s) IV Intermittent two times a day  zinc oxide 20% Ointment 1 Application(s) Topical two times a day    MEDICATIONS  (PRN):    Pertinent Labs:  Na146 mmol/L<H> Glu 99 mg/dL K+ 3.4 mmol/L<L> Cr  3.65 mg/dL<H> BUN 53 mg/dL<H>  Phos 4.1 mg/dL  Alb 1.9 g/dL<L>     CAPILLARY BLOOD GLUCOSE    POCT Blood Glucose.: 116 mg/dL (12 Dec 2022 16:59)  POCT Blood Glucose.: 109 mg/dL (12 Dec 2022 12:41)    Skin:  Pressure Injury: Unstagable x 3     Estimated Needs:   [ X ] no change since previous assessment  [ ] recalculated:     Previous Nutrition Diagnosis:   [ X ] Swallowing Difficult     Nutrition Diagnosis is [ X ] ongoing  [ ] Improving   [ ] resolved [ ] not applicable     New Nutrition Diagnosis: [ X ] not applicable       Interventions:   Recommend  [ X ] Change Diet To: Puree, Mildly Thicken fluid, DASH, No concentrated K, No concentrated PO4; Add Ensure Pudding 120 ml x bid ( 340kcal 8g protein ) if medically feasible   [ X ] Nutrition Supplement: Nephrocaps, Vit C supplements as medically feasible, Monitor needs for Zn supplement   [ ] Nutrition Support  [ X ] Other: Discussed with RN                   Provide food choices within diet Rx as available/updated                    Nursing to continue feeding assistance and encouragement, aspiration precaution     Monitoring and Evaluation:   [ X ] PO intake [ x ] Tolerance to diet prescription [ x ] weights [ x ] labs[ x ] follow up per protocol  [ ] other:

## 2022-12-13 NOTE — PROGRESS NOTE ADULT - ASSESSMENT
62 year old morbid obese male, from [facility], has past medical hx of ambulatory dysfunction, ACD, bipolar disorder, BPH, CKD, HLD, PVD, COPD not on oxygen, hypothyroidism, RBBB, lymphedema, CHF (last echo 2/22 normal EF, GIDD) was BIBEMS for hypotension 73/37 mmHg and bradycardia (40's). Patient admitted to ICU for shock and acute encephalopathy.     DX:  - Shock septic vs hypovolemia  - LE cellulitis   - Acute encephalopathy   - Sinus bradycardia  - HAGMA   - SHEYLA on CKD  - Anemia   - COPD not on oxygen at home   - HLD   - BPH  - Hypothyroidism   - Mood disorder       =================== Neuro============================  #Acute Encephalopathy; likely in the setting of acidemia, infection, uremia    Lethargic, fighting vent    Pain meds with Tylenol   CT Head negative for acute bleed/swelling  Awake and alert X3 back at baseline    #Seizure  - Home med includes valproic acid  - Patient is having repeated muscle spasms, improved on Versed but not fully stopped  - Still having muscle movements after dialysis which brought down BUN  - Started propofol instead of precedex  - Valproic acid level low, likely due to missed doses when patient was originally admitted.  - DC keppra per neuro recs, prefer valproate as less nephrotoxic  - spot EEG 12/3 - moderate generalized slowing  - 24 hr EEG (12/4)-no change, No epileptiform pattern or seizure seen.  - Valproic Acid to 750mg BID    ================= Cardiovascular==========================  Sinus bradycardia  - EKG sinus bradycardia, old RBBB  - TTE 12/1 - LVEF 60-65%  - trops neg  - Given normal EF, no changes in EKG, cardiogenic shock less likely    Hypotension  - off pressers      ================- Pulm=================================  COPD  - Patient with hx of COPD not in exacerbation   - Chest X ray Showed bilateral patchy infiltrates, no concern for fluid overload    - CXR 12/7 showed severe R Atelectasis, likely due to mucous plugging, chest PT, R side elevated  -CXR 12/7 3PM showed improvement with chest PT  -CXR 12/8 showed worsening edema, fluids stopped,   - Patient on high flow  - Oxygen supplementation as needed to maintain SpO2 of 88-92%      ==================ID===================================  Shock  - Concern for sepsis vs hypovolemic   - Empirical treatment with CTX for LE cellulitis and for pseudomonal coverage  - S/p 4L NS   - Started on pressors   - Off Pressors  F/u lactate 1.4, procalcitonin, Bcx, Ucx, CK NGTD  - ID consulted Dr Nolasco   - Completed CTX    ================= Nephro================================  SHEYLA on CKD  - On admission, patient with Cr 6.4>>5.6, baseline 3.7; stable at baseline today  - Worsening CKD most likely a combination of obstructive vs pre- renal   - Viera catheter placed in ED with 1000 cc of UO  - -58mL in the past 24 hours  - Avoid nephrotoxins, NSAIDS, ACEI and ARBS  - Monitor BMP daily  - Nephro Dr Valenzuela consulted  - S/p Dialysis 12/2 and 12/3, Creatnine near baseline, mental status improved  - Patient's renal function returned to baseline    HAGMA  - Most likely from uremia   - Nephro Dr Valenzuela consulted  - Resolved    Hypernatremia  Likely hypervolemic hypernatremia, given fluid overload, and patient's refractory Na secondary to fluid.  Encourage PO water intake      =================GI====================================  Pureed Diet  Resume psych PO medications- Wellbutrin  Swallow evaluation  BM today, regular    ================ Heme==================================  Anemia   - Anemia of chronic disease   - Monitor CBC daily   - Hgb Stable no signs of active bleed    =================Endocrine===============================  Hypothyroidism  - On Levothyroxine    ================= Skin/Catheters============================  Peripheral IV lines   Viera catheter  TOV failed; to retry on floors  Sacral and Posterior Thigh Ulcers, Wound Care Consulted    =================Prophylaxis =============================  DVT prophylaxis with Heparin SQ daily   GI prophylaxis with PPI daily    ==================GOC==================================  FULL CODE    62 year old morbid obese male, from [facility], has past medical hx of ambulatory dysfunction, ACD, bipolar disorder, BPH, CKD, HLD, PVD, COPD not on oxygen, hypothyroidism, RBBB, lymphedema, CHF (last echo 2/22 normal EF, GIDD) was BIBEMS for hypotension 73/37 mmHg and bradycardia (40's). Patient admitted to ICU for shock and acute encephalopathy.     DX:  - Shock septic vs hypovolemia  - LE cellulitis   - Acute encephalopathy   - Sinus bradycardia  - HAGMA   - SHEYLA on CKD  - Anemia   - COPD not on oxygen at home   - HLD   - BPH  - Hypothyroidism   - Mood disorder       =================== Neuro============================  #Acute Encephalopathy; likely in the setting of acidemia, infection, uremia    Lethargic, fighting vent    Pain meds with Tylenol   CT Head negative for acute bleed/swelling  Awake and alert X3 back at baseline    #Seizure  - Home med includes valproic acid  - Patient is having repeated muscle spasms, improved on Versed but not fully stopped  - Still having muscle movements after dialysis which brought down BUN  - Started propofol instead of precedex  - Valproic acid level low, likely due to missed doses when patient was originally admitted.  - DC keppra per neuro recs, prefer valproate as less nephrotoxic  - spot EEG 12/3 - moderate generalized slowing  - 24 hr EEG (12/4)-no change, No epileptiform pattern or seizure seen.  - Valproic Acid to 750mg BID    ================= Cardiovascular==========================  Sinus bradycardia  - EKG sinus bradycardia, old RBBB  - TTE 12/1 - LVEF 60-65%  - trops neg  - Given normal EF, no changes in EKG, cardiogenic shock less likely    Hypotension  - off pressers      ================- Pulm=================================  COPD  - Patient with hx of COPD not in exacerbation   - Chest X ray Showed bilateral patchy infiltrates, no concern for fluid overload    - CXR 12/7 showed severe R Atelectasis, likely due to mucous plugging, chest PT, R side elevated  -CXR 12/7 3PM showed improvement with chest PT  -CXR 12/8 showed worsening edema, fluids stopped,   - Patient on high flow  - Oxygen supplementation as needed to maintain SpO2 of 88-92%      ==================ID===================================  Shock  - Concern for sepsis vs hypovolemic   - Empirical treatment with CTX for LE cellulitis and for pseudomonal coverage  - S/p 4L NS   - Started on pressors   - Off Pressors  F/u lactate 1.4, procalcitonin, Bcx, Ucx, CK NGTD  - ID consulted Dr Nolasco   - Completed CTX    ================= Nephro================================  SHEYLA on CKD  - On admission, patient with Cr 6.4>>5.6, baseline 3.7; stable at baseline today  - Worsening CKD most likely a combination of obstructive vs pre- renal   - Viera catheter placed in ED with 1000 cc of UO  - -58mL in the past 24 hours  - Avoid nephrotoxins, NSAIDS, ACEI and ARBS  - Monitor BMP daily  - Nephro Dr Valenzuela consulted  - S/p Dialysis 12/2 and 12/3, Creatnine near baseline, mental status improved  - Patient's renal function returned to baseline    HAGMA  - Most likely from uremia   - Nephro Dr Valenzuela consulted  - Resolved    Hypernatremia  Likely hypervolemic hypernatremia, given fluid overload, and patient's refractory Na secondary to fluid.  Encourage PO water intake      =================GI====================================  Pureed Diet  Resume psych PO medications- Wellbutrin  Swallow evaluation  BM today, regular    ================ Heme==================================  Anemia   - Anemia of chronic disease   - Monitor CBC daily   - No active bleed  - Hgb 7  [ ] 1 unit prBC    =================Endocrine===============================  Hypothyroidism  - On Levothyroxine    ================= Skin/Catheters============================  Peripheral IV lines   Sacral and Posterior Thigh Ulcers, Wound Care Consulted  [ ] TOV today    =================Prophylaxis =============================  DVT prophylaxis with Heparin SQ daily   GI prophylaxis with PPI daily    ==================GOC==================================  FULL CODE

## 2022-12-13 NOTE — PROGRESS NOTE ADULT - ATTENDING COMMENTS
62 year old morbid obese male, from [facility], has past medical hx of ambulatory dysfunction, ACD, bipolar disorder, BPH, CKD, HLD, PVD, COPD not on oxygen, hypothyroidism, RBBB, lymphedema, CHF (last echo 2/22 normal EF, GIDD). Presented for hypotension 73/37 mmHg and bradycardia (40's). Patient admitted to ICU for shock and acute encephalopathy. Now intubated on mechanical ventilation.     DX:  - Acute hypoxic resp failure   - Shock - septic vs hypovolemia  - Bilateral lower cellulitis   - Acute encephalopathy   - Sinus bradycardia  - HAGMA   - SHEYLA on CKD  - Anemia   - COPD not on oxygen at home   - HLD   - BPH  - Hypothyroidism   - Mood disorder       Plan:  - Comfortable on NC oxygent  - Completed course of antibx   - CT scan showing basilar pneumonia   - ID following   - Cultures negative   - Monitor renal function  - HD as per Neph  - Transfuse 1 unit PRBC, no obvious signs of bleeding  - Cont. home psych medications  - Cont. Levothyroxine  - DVT and stress ulcer prophylaxis.   - Transfer out of ICU today  - Discharge planning

## 2022-12-13 NOTE — PROGRESS NOTE ADULT - ASSESSMENT
1. SHEYLA due to ATN.  First HD 12/2. s/p shiley on 12/2. dialysis started since Scr not improved and remained at 5s.  -Scr stable at 3.5mg/dl and and unchanged. Last HD on saturday (12/3 and 12/2) with signs of renal recovery.  -Adjust meds to eGFR and avoid IV Gadolinium contrast,NSAIDs, and phosphate enema.  -Monitor I/O's daily.   -Monitor SMA daily.  2. CKD stage 4 most likely due to ischemic nephropathy  -baseline scr around 3.7mg/dL in july 2022.  -Keep patient euvolemic and renal diet  -Avoid Nephrotoxic Meds/ Agents such as (NSAIDs, IV contrast, Aminoglycosides such as gentamicin, -Gadolinium contrast, Phosphate containing enemas, etc..)  -Adjust Medications according to eGFR  3. Hypotension possible sepsis  -bp is stable and off pressor.  4. Mineral Bone Disease:  -phos is improving and PTH intact is 400.  -continue calcitriol 0.25mcg daily.  5. HAGMA and respiratory acidosis:  -abg noted.   -patient has respiratory acidosis and managed by ICU.   6. Bradycardia due to unclear etiology  -stable.  -TSH is nl  7. Pulm:  -s/p intubated on 11/30 for worsening respiratory support and extubated.  -plan as per MICU.   8. Anemia of ESRD:  -iron panel and ferritin is acceptable.   -F/u CBC daily  -transfuse if HB < 7.0.  9. Encephalopathy:  -video EEG shows no seizures.    10. Hypernatremia due to water deficit and insensible losses. Pt is clinically hypervolemic.   -Na improving and continue free water 200cc 6hrs. caution with IVFs since CXR shows congestion.   -Monitor I/O's. Check Serum Na Daily. Avoid high solute intake diet and sodium bicarbonate infuse. Avoid overcorrection of NA (8-10meq/day)  10. Hypokalemia:  -stable.  -monitor K.     Patient is critically ill. Time Spent: 35mins.  Discussed the assessment and plan with ICU Team/Nurse

## 2022-12-13 NOTE — PROGRESS NOTE ADULT - SUBJECTIVE AND OBJECTIVE BOX
INTERVAL HPI/OVERNIGHT EVENTS:     PRESSORS: [ ] YES [ ] NO  WHICH:    ANTIBIOTICS:                  DATE STARTED:  ANTIBIOTICS:                  DATE STARTED:  ANTIBIOTICS:                  DATE STARTED:    Antimicrobial:    Cardiovascular:  doxazosin 1 milliGRAM(s) Oral at bedtime    Pulmonary:  albuterol/ipratropium for Nebulization 3 milliLiter(s) Nebulizer every 6 hours    Hematalogic:  heparin   Injectable 5000 Unit(s) SubCutaneous every 8 hours    Other:  buPROPion . 75 milliGRAM(s) Oral every 12 hours  chlorhexidine 2% Cloths 1 Application(s) Topical <User Schedule>  collagenase Ointment 1 Application(s) Topical two times a day  levothyroxine 125 MICROGram(s) Oral daily  pantoprazole   Suspension 40 milliGRAM(s) Oral daily  risperiDONE   Tablet 2 milliGRAM(s) Oral at bedtime  sevelamer carbonate Powder 800 milliGRAM(s) Oral three times a day with meals  valproate sodium  IVPB 750 milliGRAM(s) IV Intermittent two times a day  zinc oxide 20% Ointment 1 Application(s) Topical two times a day      Drug Dosing Weight  Height (cm): 177.8 (30 Nov 2022 18:45)  Weight (kg): 113.9 (30 Nov 2022 18:45)  BMI (kg/m2): 36 (30 Nov 2022 18:45)  BSA (m2): 2.3 (30 Nov 2022 18:45)    CENTRAL LINE: [ ] YES [ ] NO  LOCATION:   DATE INSERTED:  REMOVE: [ ] YES [ ] NO  EXPLAIN:    HATFIELD: [ ] YES [ ] NO    DATE INSERTED:  REMOVE:  [ ] YES [ ] NO  EXPLAIN:    A-LINE:  [ ] YES [ ] NO  LOCATION:   DATE INSERTED:  REMOVE:  [ ] YES [ ] NO  EXPLAIN:    PMH/Social Hx/Fam Hx -reviewed admission note, no change since admission  PAST MEDICAL & SURGICAL HISTORY:  Hypothyroid      Hyperlipidemia      Ambulatory dysfunction      Anemia      History of BPH      CKD (chronic kidney disease)      Chronic obstructive pulmonary disease (COPD)      Bipolar disorder      HLD (hyperlipidemia)      BPH (benign prostatic hyperplasia)      Chronic diastolic congestive heart failure      Lymphedema      No significant past surgical history        Heart faliure: acute [ ] chronic [ ] acute or chronic [ ] diastolic [ ] systolic [ ] combied systolic and diastolic[ ]  SHEYLA: ATN[ ] renal medullary necrosis [ ] CKD I [ ]CKDII [ ]CKD III [ ]CKD IV [ ]CKD V [ ]Other pathological lesions [ ]  Abdominal Nutrition Status: malnutrition [ ] cachexia [ ] morbid obesity/BMI=40 [ ] Supplement ordered [___________]     T(C): 36.3 (12-13-22 @ 04:00), Max: 37.1 (12-12-22 @ 15:25)  HR: 81 (12-13-22 @ 07:00)  BP: 131/63 (12-13-22 @ 07:00)  BP(mean): 80 (12-13-22 @ 07:00)  ABP: --  ABP(mean): --  RR: 20 (12-13-22 @ 07:00)  SpO2: 91% (12-13-22 @ 07:00)  Wt(kg): --          12-12 @ 07:01  -  12-13 @ 07:00  --------------------------------------------------------  IN: 530 mL / OUT: 3525 mL / NET: -2995 mL            PHYSICAL EXAM:    GENERAL: [ ]NAD, [ ]well-groomed, [ ]well-developed  HEAD:  [ ]Atraumatic, [ ]Normocephalic  EYES: [ ]EOMI, [ ]PERRLA, [ ]conjunctiva and sclera clear  ENMT: [ ]No tonsillar erythema, exudates, or enlargement; [ ]Moist mucous membranes, [ ]Good dentition, [ ]No lesions  NECK: [ ]Supple, normal appearance, [ ]No JVD; [ ]Normal thyroid; [ ]Trachea midline  NERVOUS SYSTEM:  [ ]Alert & Oriented X3, [ ]Good concentration; [ ]Motor Strength 5/5 B/L upper and lower extremities; [ ]DTRs 2+ intact and symmetric  CHEST/LUNG: [ ]No chest deformity; [ ]Normal percussion bilaterally; [ ]No rales, rhonchi, wheezing; [ ]Crackles at bases  HEART: [ ]Regular rate and rhythm; [ ]No murmurs, rubs, or gallops  ABDOMEN: [ ]Soft, Nontender, Nondistended; [ ]Bowel sounds present  EXTREMITIES:  [ ]2+ Peripheral Pulses, [ ]No clubbing, cyanosis, or edema [ ]Bilat lower extremity edema  LYMPH: [ ]No lymphadenopathy noted  SKIN: [ ]No rashes or lesions; [ ]Good capillary refill      LABS:  CBC Full  -  ( 13 Dec 2022 03:20 )  WBC Count : 9.86 K/uL  RBC Count : 2.34 M/uL  Hemoglobin : 7.0 g/dL  Hematocrit : 24.7 %  Platelet Count - Automated : 240 K/uL  Mean Cell Volume : 105.6 fl  Mean Cell Hemoglobin : 29.9 pg  Mean Cell Hemoglobin Concentration : 28.3 gm/dL  Auto Neutrophil # : x  Auto Lymphocyte # : x  Auto Monocyte # : x  Auto Eosinophil # : x  Auto Basophil # : x  Auto Neutrophil % : x  Auto Lymphocyte % : x  Auto Monocyte % : x  Auto Eosinophil % : x  Auto Basophil % : x    12-13    146<H>  |  112<H>  |  53<H>  ----------------------------<  99  3.4<L>   |  26  |  3.65<H>    Ca    9.7      13 Dec 2022 03:20  Phos  4.1     12-13  Mg     1.8     12-13    TPro  6.1  /  Alb  1.9<L>  /  TBili  0.3  /  DBili  x   /  AST  4<L>  /  ALT  10  /  AlkPhos  74  12-12            RADIOLOGY & ADDITIONAL STUDIES REVIEWED:      [ ]GOALS OF CARE DISCUSSION WITH PATIENT/FAMILY/PROXY:    CRITICAL CARE TIME SPENT: 35 minutes INTERVAL HPI/OVERNIGHT EVENTS: Patient placed on isolation precaution because patient who shared same room had COVID-19. To be placed on DROPLET precautions per Infection Control; last day 12/19/22    Antimicrobial:    Cardiovascular:  doxazosin 1 milliGRAM(s) Oral at bedtime    Pulmonary:  albuterol/ipratropium for Nebulization 3 milliLiter(s) Nebulizer every 6 hours    Hematalogic:  heparin   Injectable 5000 Unit(s) SubCutaneous every 8 hours    Other:  buPROPion . 75 milliGRAM(s) Oral every 12 hours  chlorhexidine 2% Cloths 1 Application(s) Topical <User Schedule>  collagenase Ointment 1 Application(s) Topical two times a day  levothyroxine 125 MICROGram(s) Oral daily  pantoprazole   Suspension 40 milliGRAM(s) Oral daily  risperiDONE   Tablet 2 milliGRAM(s) Oral at bedtime  sevelamer carbonate Powder 800 milliGRAM(s) Oral three times a day with meals  valproate sodium  IVPB 750 milliGRAM(s) IV Intermittent two times a day  zinc oxide 20% Ointment 1 Application(s) Topical two times a day      Drug Dosing Weight  Height (cm): 177.8 (30 Nov 2022 18:45)  Weight (kg): 113.9 (30 Nov 2022 18:45)  BMI (kg/m2): 36 (30 Nov 2022 18:45)  BSA (m2): 2.3 (30 Nov 2022 18:45)      PMH/Social Hx/Fam Hx -reviewed admission note, no change since admission  PAST MEDICAL & SURGICAL HISTORY:  Hypothyroid      Hyperlipidemia      Ambulatory dysfunction      Anemia      History of BPH      CKD (chronic kidney disease)      Chronic obstructive pulmonary disease (COPD)      Bipolar disorder      HLD (hyperlipidemia)      BPH (benign prostatic hyperplasia)      Chronic diastolic congestive heart failure      Lymphedema      No significant past surgical history        Heart faliure: acute [ ] chronic [ ] acute or chronic [ ] diastolic [ ] systolic [ ] combied systolic and diastolic[ ]  SHEYLA: ATN[ ] renal medullary necrosis [ ] CKD I [ ]CKDII [ ]CKD III [ ]CKD IV [ ]CKD V [ ]Other pathological lesions [ ]  Abdominal Nutrition Status: malnutrition [ ] cachexia [ ] morbid obesity/BMI=40 [ ] Supplement ordered [___________]     T(C): 36.3 (12-13-22 @ 04:00), Max: 37.1 (12-12-22 @ 15:25)  HR: 81 (12-13-22 @ 07:00)  BP: 131/63 (12-13-22 @ 07:00)  BP(mean): 80 (12-13-22 @ 07:00)  ABP: --  ABP(mean): --  RR: 20 (12-13-22 @ 07:00)  SpO2: 91% (12-13-22 @ 07:00)  Wt(kg): --          12-12 @ 07:01  -  12-13 @ 07:00  --------------------------------------------------------  IN: 530 mL / OUT: 3525 mL / NET: -2995 mL            PHYSICAL EXAM:  GENERAL: NAD  HEAD:  Atraumatic, Normocephalic  EYES: EOMI, PERRLA, conjunctiva and sclera clear  NECK: Supple, normal appearance,  NERVOUS SYSTEM:  Alert & Oriented X3,  Motor Strength 5/5 B/L upper and lower extremities, sensation intact  CHEST/LUNG: Lungs clear to auscultation bilaterally, No rales, rhonchi, wheezing   HEART: Regular rate and rhythm; No murmurs, rubs, or gallops  ABDOMEN: Soft, Nontender, Nondistended; Bowel sounds present  EXTREMITIES:  Chronic bilateral edema of the LE        LABS:  CBC Full  -  ( 13 Dec 2022 03:20 )  WBC Count : 9.86 K/uL  RBC Count : 2.34 M/uL  Hemoglobin : 7.0 g/dL  Hematocrit : 24.7 %  Platelet Count - Automated : 240 K/uL  Mean Cell Volume : 105.6 fl  Mean Cell Hemoglobin : 29.9 pg  Mean Cell Hemoglobin Concentration : 28.3 gm/dL  Auto Neutrophil # : x  Auto Lymphocyte # : x  Auto Monocyte # : x  Auto Eosinophil # : x  Auto Basophil # : x  Auto Neutrophil % : x  Auto Lymphocyte % : x  Auto Monocyte % : x  Auto Eosinophil % : x  Auto Basophil % : x    12-13    146<H>  |  112<H>  |  53<H>  ----------------------------<  99  3.4<L>   |  26  |  3.65<H>    Ca    9.7      13 Dec 2022 03:20  Phos  4.1     12-13  Mg     1.8     12-13    TPro  6.1  /  Alb  1.9<L>  /  TBili  0.3  /  DBili  x   /  AST  4<L>  /  ALT  10  /  AlkPhos  74  12-12            RADIOLOGY & ADDITIONAL STUDIES REVIEWED:      [ ]GOALS OF CARE DISCUSSION WITH PATIENT/FAMILY/PROXY:    CRITICAL CARE TIME SPENT: 35 minutes

## 2022-12-13 NOTE — PROGRESS NOTE ADULT - SUBJECTIVE AND OBJECTIVE BOX
NEPHROLOGY MEDICAL CARE, [facility] - Dr. Taj Valenzuela/ Dr. Hazel Solis/ Dr. Cosmo Root/ Dr. Fang Gifford    Patient was seen and examined at bedside.    CC: patient is okay and NAD    Vital Signs Last 24 Hrs  T(C): 37.1 (13 Dec 2022 15:23), Max: 37.1 (13 Dec 2022 15:23)  T(F): 98.8 (13 Dec 2022 15:23), Max: 98.8 (13 Dec 2022 15:23)  HR: 85 (13 Dec 2022 14:00) (69 - 90)  BP: 130/64 (13 Dec 2022 14:00) (87/41 - 142/70)  BP(mean): 82 (13 Dec 2022 14:00) (55 - 93)  RR: 29 (13 Dec 2022 14:00) (12 - 32)  SpO2: 95% (13 Dec 2022 13:00) (91% - 100%)    Parameters below as of 13 Dec 2022 07:00  Patient On (Oxygen Delivery Method): nasal cannula  O2 Flow (L/min): 1      12-12 @ 07:01  -  12-13 @ 07:00  --------------------------------------------------------  IN: 530 mL / OUT: 3525 mL / NET: -2995 mL        PHYSICAL EXAM:  General: NAD on NC  Eyes: conjunctiva and sclera clear  ENMT: Atraumatic, Normocephalic;  Respiratory: Bilateral poor air entry  Cardiovascular: S1S2+; no m/r/g  Gastrointestinal: Soft, Non-tender, Nondistended; Bowel sounds present,   : daley's cath with yellowish urine.  Neuro:  AAOx2-3  Ext:  vascular skin changes of both legs; No Cyanosis  Skin: No visible rashes      MEDICATIONS:  MEDICATIONS  (STANDING):  albuterol/ipratropium for Nebulization 3 milliLiter(s) Nebulizer every 6 hours  buPROPion . 75 milliGRAM(s) Oral every 12 hours  chlorhexidine 2% Cloths 1 Application(s) Topical <User Schedule>  collagenase Ointment 1 Application(s) Topical two times a day  doxazosin 1 milliGRAM(s) Oral at bedtime  heparin   Injectable 5000 Unit(s) SubCutaneous every 8 hours  levothyroxine 125 MICROGram(s) Oral daily  pantoprazole   Suspension 40 milliGRAM(s) Oral daily  risperiDONE   Tablet 2 milliGRAM(s) Oral at bedtime  sevelamer carbonate Powder 800 milliGRAM(s) Oral three times a day with meals  valproate sodium  IVPB 750 milliGRAM(s) IV Intermittent two times a day  zinc oxide 20% Ointment 1 Application(s) Topical two times a day    MEDICATIONS  (PRN):          LABS:                        7.0    9.86  )-----------( 240      ( 13 Dec 2022 03:20 )             24.7     12-13    146<H>  |  112<H>  |  53<H>  ----------------------------<  99  3.4<L>   |  26  |  3.65<H>    Ca    9.7      13 Dec 2022 03:20  Phos  4.1     12-13  Mg     1.8     12-13    TPro  6.1  /  Alb  1.9<L>  /  TBili  0.3  /  DBili  x   /  AST  4<L>  /  ALT  10  /  AlkPhos  74  12-12        Magnesium, Serum: 1.8 mg/dL (12-13 @ 03:20)  Phosphorus Level, Serum: 4.1 mg/dL (12-13 @ 03:20)    Urine studies  Osmolality, Random Urine: 301 mos/kg (12-11 @ 04:30)    PTH and Vit D:

## 2022-12-14 NOTE — PROGRESS NOTE ADULT - ATTENDING COMMENTS
Seen at bedside. Legs/ankles improving. Less swelling, resolved wounds, resolved cellulitis.  No acute intervention planned. Continue to offload

## 2022-12-14 NOTE — CONSULT NOTE ADULT - PROBLEM SELECTOR RECOMMENDATION 9
Pt with chronic right leg pain which is somatic in nature since hx remote fall 7 years ago. Takes Tylenol at home.   Non-opioid pain recommendations   - Avoid NSAIDs due to CKD.  - Acetaminophen 1 gram PO q 8 hours PRN moderate pain.  Bowel Regimen  - per primary team  Mild pain   - Non-pharmacological pain treatment recommendations  - Warm/ Cool packs PRN   - Repositioning extremity, elevation, imagery, relaxation, distraction.  - Physical therapy OOB if no contraindications   Recommendations discussed with primary team and RN

## 2022-12-14 NOTE — DISCHARGE NOTE PROVIDER - NSDCCPCAREPLAN_GEN_ALL_CORE_FT
PRINCIPAL DISCHARGE DIAGNOSIS  Diagnosis: Uremic encephalopathy  Assessment and Plan of Treatment: You came in with altered mental status. Initial imaging was found to be negative with any acute bleed or hemorrhage. BUN/Creatinine was extremely elevated indicating that you had higher your uremia levels that were likely contributing to your alternate mental status. Your required emergent dialysis because of your worsening kidney function. In hyperuremia you received two sessions of dialysis which improved your kidney numbers and decrease your uremia. Your given sedation medication which was titrated down. You're awaken and were allowed to breathe on your own. Please follow up with nephrologist        SECONDARY DISCHARGE DIAGNOSES  Diagnosis: Hypotension  Assessment and Plan of Treatment: You had extreme hypertension likely due to shock. We treated you with  antibiotics to cover for any infection though your blood cultures and your urianalysis were negative. We treated you with vasopressors until your blood pressure stabilized. This hypotension was likely due to your kidney function, after dialysis your blood pressure imroved.    Diagnosis: Chronic leg pain  Assessment and Plan of Treatment: We had chronic venous insufficiency your evaluated by podiatry. They indicated that you had no source of infection that this was due to chronic insufficiency. Please follow up with PODIATRY OUTPATIENT  Continue wearing CAIR boots to avoid pressure ulcers as tolerated by patient  Recommend decub precautions  Outpatient nail care as needed    Diagnosis: Urinary retention  Assessment and Plan of Treatment: You retaining urine and needed a fully catheter placed. We attempted to do trial voids but you fail to void on your own and we're retaining fluid. Urology was consulted. They recommended for their ambulation and that there was no indication for inpatient work up at this time. They recommended outpatient follow up with urology and discharge with a daley catheter. PLEASE FOLLOW UP with OUTPATIENT UROLOGY    Diagnosis: Bipolar disorder  Assessment and Plan of Treatment: You have history of bipolar disorder. We held your medications due to your mental status and your sedation but gave you your medications. Once you're able to tolerate oral medications, please follow up with outpatient psychiatry  PLEASE CONTINUE TAKING YOUR PSYCHIATRIC MEDICATIONS      Diagnosis: Seizure  Assessment and Plan of Treatment: You have a history of seizures. You had seizures coming in while you were had elevated uremia. We did an EEG and found no new focal seizure locations. Neurology recommended increasing valproic acid and Aislinn and as your seizures were controlled, a decreased and discontinued the kept route. We adjusted your valparaic acid level medications and adjusted them to PO. Please follow up with outpatient neurology.  PLEASE CONTINUE TAKING 750MG DEPAKOTE TWICE DAILY

## 2022-12-14 NOTE — PROGRESS NOTE ADULT - ATTENDING COMMENTS
62 year old morbid obese male, from [facility], has past medical hx of ambulatory dysfunction, ACD, bipolar disorder, BPH, CKD, HLD, PVD, COPD not on oxygen, hypothyroidism, RBBB, lymphedema, CHF (last echo 2/22 normal EF, GIDD). Presented for hypotension 73/37 mmHg and bradycardia (40's). Patient admitted to ICU for shock and acute encephalopathy. Now intubated on mechanical ventilation.     DX:  - Acute hypoxic resp failure   - Shock - septic vs hypovolemia  - Bilateral lower cellulitis   - Acute encephalopathy   - Sinus bradycardia  - HAGMA   - SHEYLA on CKD  - Anemia   - COPD not on oxygen at home   - HLD   - BPH  - Hypothyroidism   - Mood disorder       Plan:  - Comfortable on NC oxygent  - Completed course of antibx   - CT scan showing basilar pneumonia   - ID following   - Cultures negative   - Monitor renal function  - HD as per Neph  - Transfuse 1 unit PRBC, no obvious signs of bleeding  - Cont. home psych medications  - Cont. Levothyroxine  - Failed void trial, daley reinserted  - Urology consult  - Cont. Doxazosin   - DVT and stress ulcer prophylaxis.   - Oral diet  - Transfer out of ICU today  - Discharge planning

## 2022-12-14 NOTE — CONSULT NOTE ADULT - SUBJECTIVE AND OBJECTIVE BOX
Source of information: GAYLA PEARCE, Chart review  Patient language: English  : n/a    HPI:  62 year old morbid obese male, from [facility], has past medical hx of ambulatory dysfunction, ACD, bipolar disorder, BPH, CKD, HLD, PVD,  COPD not on oxygen, hypothyroidism, RBBB, lymphedema, CHF (last echo 2/22 normal EF, GIDD) was BIBEMS for hypotension 73/37 mmHg and bradycardia (40's). Story obtained from ED provider note as patient is not able to provide any history. Patient received 500 cc NS and one dose of atropine for bradycardia. Patient very somnolent during interview but arousable to verbal stimuli.  (30 Nov 2022 18:39)    Dx with Shock, Acute Encephalopathy, currently in ICU. Pain consulted for right leg pain. Pt seen and examined at bedside. Pt laying in bed, denies pain at this time. Reports occasional right leg pain, around left thigh upon movement. Reports pain increases to a score 8-9/10 when right leg is lifted  SCALE USED: (1-10 VNRS). Pt describes pain as sharp, radiating to right leg, alleviated by Tylenol and resting leg on bed, exacerbated by elevating right leg. Denies recent injury/ trauma. Reports he has been experiencing right thigh pain since having a remote fall 7 years ago. Denies hx fractures, surgery. Takes PRN Tylenol at home for the pain.  Pt tolerating PO diet. Denies lethargy, chest pain, SOB, nausea, vomiting, constipation. On nighttime O2 at home. Reports last BM 12/14, loose. Patient stated goal for pain control: to be able to take deep breaths, get out of bed to chair with tolerable pain control. Reports he has last been out of bed to chair 1 month ago.     PAST MEDICAL & SURGICAL HISTORY:  Hypothyroid      Hyperlipidemia      Ambulatory dysfunction      Anemia      History of BPH      CKD (chronic kidney disease)      Chronic obstructive pulmonary disease (COPD)      Bipolar disorder      HLD (hyperlipidemia)      BPH (benign prostatic hyperplasia)      Chronic diastolic congestive heart failure      Lymphedema      No significant past surgical history          FAMILY HISTORY:      Social History:   [ X] Denies ETOH use, illicit drug use   [X] former smoking, quit 1 year ago.     Allergies    No Known Allergies    MEDICATIONS  (STANDING):  buPROPion . 75 milliGRAM(s) Oral every 12 hours  chlorhexidine 2% Cloths 1 Application(s) Topical <User Schedule>  collagenase Ointment 1 Application(s) Topical two times a day  doxazosin 1 milliGRAM(s) Oral at bedtime  heparin   Injectable 5000 Unit(s) SubCutaneous every 8 hours  levothyroxine 125 MICROGram(s) Oral daily  pantoprazole   Suspension 40 milliGRAM(s) Oral daily  risperiDONE   Tablet 2 milliGRAM(s) Oral at bedtime  sevelamer carbonate Powder 800 milliGRAM(s) Oral three times a day with meals  simethicone 80 milliGRAM(s) Chew two times a day  valproic  acid Syrup 500 milliGRAM(s) Oral three times a day  zinc oxide 20% Ointment 1 Application(s) Topical two times a day    MEDICATIONS  (PRN):      Vital Signs Last 24 Hrs  T(C): 36.6 (14 Dec 2022 04:38), Max: 37.1 (13 Dec 2022 15:23)  T(F): 97.9 (14 Dec 2022 04:38), Max: 98.8 (13 Dec 2022 15:23)  HR: 85 (14 Dec 2022 12:00) (76 - 95)  BP: 118/65 (14 Dec 2022 12:00) (90/64 - 140/59)  BP(mean): 78 (14 Dec 2022 12:00) (67 - 94)  RR: 16 (14 Dec 2022 12:00) (16 - 38)  SpO2: 96% (14 Dec 2022 12:00) (87% - 97%)    Parameters below as of 13 Dec 2022 19:00  Patient On (Oxygen Delivery Method): nasal cannula  O2 Flow (L/min): 2      LABS: Reviewed.                          8.0    9.61  )-----------( 279      ( 14 Dec 2022 04:45 )             27.9     12-14    147<H>  |  112<H>  |  51<H>  ----------------------------<  102<H>  3.8   |  28  |  3.62<H>    Ca    9.7      14 Dec 2022 04:45  Phos  3.1     12-14  Mg     1.8     12-14    TPro  6.5  /  Alb  1.7<L>  /  TBili  0.3  /  DBili  x   /  AST  5<L>  /  ALT  8<L>  /  AlkPhos  78  12-14      LIVER FUNCTIONS - ( 14 Dec 2022 04:45 )  Alb: 1.7 g/dL / Pro: 6.5 g/dL / ALK PHOS: 78 U/L / ALT: 8 U/L DA / AST: 5 U/L / GGT: x             CAPILLARY BLOOD GLUCOSE        COVID-19 PCR: NotDetec (22 Jul 2022 10:48)  SARS-CoV-2: NotDetec (17 Jul 2022 10:51)  COVID-19 PCR: NotDetec (17 Jul 2022 08:21)      Radiology: Reviewed.   < from: Xray Chest 1 View- PORTABLE-Urgent (Xray Chest 1 View- PORTABLE-Urgent .) (12.11.22 @ 14:20) >  ACC: [id number] EXAM:  XR CHEST PORTABLE URGENT 1V                          PROCEDURE DATE:  12/11/2022          INTERPRETATION:  TIME OF EXAM: December 11, 2022 at 2:06 PM.    CLINICAL INFORMATION: Pulmonary edema.    COMPARISON:  December 10, 2022.    TECHNIQUE:   AP Portable chest x-ray.    INTERPRETATION:    Heart size and the mediastinum cannot be accurately evaluated on this   projection.  Low lung volumes. Mildly elevated right hemidiaphragm again seen.  Patchy right lung opacities are notsignificantly changed allowing for   different patient position.  Continued right basilar linear atelectasis and trace right pleural   effusion. Left basilar linear atelectasis.  Left retrocardiac opacity, not significantly changed. No left pleural   effusion.  No pneumothorax seen.  There is osteoarthritic degenerative change of the spine.      IMPRESSION:  Low lung volumes with mild right hemidiaphragm elevation   again seen.    No significant interval change in patchy right lung opacities, allowing   for different patient position, or left retrocardiac opacity. The   findings are nonspecific and could be due to areas of atelectasis,   asymmetric pulmonary edema, or multifocal infection.    Bibasilar linear atelectasis.    Trace right pleural effusion.    --- End of Report ---            TREVOR OLIVAS MD; Attending Radiologist  This document has been electronically signed. Dec 12 2022  5:14PM    < end of copied text >      ORT Score -   Family Hx of substance abuse	Female	      Male  Alcohol 	                                           1                     3  Illegal drugs	                                   2                     3  Rx drugs                                           4 	                  4  Personal Hx of substance abuse		  Alcohol 	                                          3	                  3  Illegal drugs                                     4	                  4  Rx drugs                                            5 	                  5  Age between 16- 45 years	           1                     1  hx preadolescent sexual abuse	   3 	                  0  Psychological disease		  ADD, OCD, bipolar, schizophrenia   2	          2  Depression                                           1 	          1  Total: 2    a score of 3 or lower indicates low risk for opioid abuse		  a score of 4-7 indicates moderate risk for opioid abuse		  a score of 8 or higher indicates high risk for opioid abuse    REVIEW OF SYSTEMS:  CONSTITUTIONAL: No fever or fatigue  RESPIRATORY: No cough, wheezing, chills or hemoptysis; No shortness of breath  CARDIOVASCULAR: No chest pain, palpitations, dizziness, or leg swelling  GASTROINTESTINAL: No loss of appetite, decreased PO intake. No abdominal or epigastric pain. No nausea, vomiting; No diarrhea or constipation.   GENITOURINARY: No dysuria, frequency, hematuria, retention   MUSCULOSKELETAL: + occasional right leg pain. + b/l leg swelling; No back pain, no upper motor strength weakness + b/l lower motor strength weakness, no saddle anesthesia, bowel/bladder incontinence, no recent falls   NEURO: No headaches, No numbness/tingling b/l LE, + b/l LE weakness  PSYCHIATRIC: + bipolar     PHYSICAL EXAM:  GENERAL:  Alert & Oriented X4, cooperative, NAD, Good concentration. Speech is clear.   RESPIRATORY: Respirations even and unlabored. Clear to auscultation bilaterally; No rales, rhonchi, wheezing, or rubs + O2 2L NC   CARDIOVASCULAR: Normal S1/S2, regular rate and rhythm; No murmurs, rubs, or gallops. No JVD.   GASTROINTESTINAL: + obese per BMI, Soft, Nontender, Nondistended; Bowel sounds present  GENITOURINARY: + urinary catheter draining clear yellow urine   PERIPHERAL VASCULAR:  Extremities warm with severe b/l edema. + lymphedema; 2+ Peripheral Pulses, No cyanosis, No calf tenderness  MUSCULOSKELETAL: Motor Strength 4/5 B/L upper and 2/5 lower extremities; + decreased ROM b/l legs; No tenderness on palpation of all joints.   SKIN: Warm, dry, intact. No rashes, lesions, scars or wounds.     Risk factors associated with adverse outcomes related to opioid treatment  [ ]  Concurrent benzodiazepine use  [ ]  History/ Active substance use or alcohol use disorder  [X ] Psychiatric co-morbidity  [ ] Sleep apnea  [X] COPD  [X ] BMI> 35  [ ] Liver dysfunction  [X ] Renal dysfunction  [X ] CHF  [X ] Former Smoker, quit 1 year ago  [X ]  Age > 60 years    [X ]  [facility]  Reviewed and Copied to Chart. See below.    Plan of care and goal oriented pain management treatment options were discussed with patient and /or primary care giver; all questions and concerns were addressed and care was aligned with patient's wishes.    Educated patient on goal oriented pain management treatment options

## 2022-12-14 NOTE — CONSULT NOTE ADULT - CONSULT REQUESTED DATE/TIME
01-Dec-2022
14-Dec-2022 09:15
12-Dec-2022
30-Nov-2022 18:32
01-Dec-2022 17:29
02-Dec-2022 13:02
03-Dec-2022 21:08

## 2022-12-14 NOTE — DISCHARGE NOTE PROVIDER - NSDCCAREPROVSEEN_GEN_ALL_CORE_FT
Mauri Cohen Vicki, Mauri H  Parker, Priyanka Dooley, Viri Lopez, Yohannes Martinez, Renetta Ardon, Fox Rothman, Cory Vargas, López Angeles, Elly Ramírez, Keely Cohen, Mauri H  Celeste, Oscar Navas, José Luis Lim, Jennie López, Ajith Foster, Lee Sears, Valeriy Das, Liberty Morin, Gunjan Rosales, Parisa Snow, Papi Vang, Sally Rose, Eric Fraire, Elly Nice, Jeffery Phillips, Serina Gray, [facility]

## 2022-12-14 NOTE — CONSULT NOTE ADULT - REASON FOR ADMISSION
Shock, Acute Encephalopathy

## 2022-12-14 NOTE — PROGRESS NOTE ADULT - SUBJECTIVE AND OBJECTIVE BOX
[facility], [facility] - Dr. Taj Valenzuela/ Dr. Hazel Solis/ Dr. Cosmo Root/ Dr. Fang Gifford    Patient was seen and examined at bedside.    CC: patient is okay and NAD    Vital Signs Last 24 Hrs  T(C): 36.6 (14 Dec 2022 04:38), Max: 37.1 (13 Dec 2022 23:10)  T(F): 97.9 (14 Dec 2022 04:38), Max: 98.8 (13 Dec 2022 23:10)  HR: 87 (14 Dec 2022 13:00) (76 - 95)  BP: 109/51 (14 Dec 2022 14:00) (90/64 - 135/69)  BP(mean): 68 (14 Dec 2022 14:00) (67 - 94)  RR: 27 (14 Dec 2022 13:00) (16 - 38)  SpO2: 95% (14 Dec 2022 13:00) (87% - 97%)    Parameters below as of 13 Dec 2022 19:00  Patient On (Oxygen Delivery Method): nasal cannula  O2 Flow (L/min): 2      12-13 @ 07:01  -  12-14 @ 07:00  --------------------------------------------------------  IN: 1730 mL / OUT: 3850 mL / NET: -2120 mL        PHYSICAL EXAM:  General: NAD on NC  Eyes: conjunctiva and sclera clear  ENMT: Atraumatic, Normocephalic;  Respiratory: Bilateral poor air entry  Cardiovascular: S1S2+; no m/r/g  Gastrointestinal: Soft, Non-tender, Nondistended; Bowel sounds present,   : daley's cath with yellowish urine.  Neuro:  AAOx2-3  Ext:  vascular skin changes of both legs; No Cyanosis  Skin: No visible rashes      MEDICATIONS:  MEDICATIONS  (STANDING):  buPROPion . 75 milliGRAM(s) Oral every 12 hours  chlorhexidine 2% Cloths 1 Application(s) Topical <User Schedule>  collagenase Ointment 1 Application(s) Topical two times a day  doxazosin 1 milliGRAM(s) Oral at bedtime  heparin   Injectable 5000 Unit(s) SubCutaneous every 8 hours  levothyroxine 125 MICROGram(s) Oral daily  pantoprazole   Suspension 40 milliGRAM(s) Oral daily  risperiDONE   Tablet 2 milliGRAM(s) Oral at bedtime  sevelamer carbonate Powder 800 milliGRAM(s) Oral three times a day with meals  simethicone 80 milliGRAM(s) Chew two times a day  valproic  acid Syrup 500 milliGRAM(s) Oral three times a day  zinc oxide 20% Ointment 1 Application(s) Topical two times a day    MEDICATIONS  (PRN):          LABS:                        8.0    9.61  )-----------( 279      ( 14 Dec 2022 04:45 )             27.9     12-14    147<H>  |  112<H>  |  51<H>  ----------------------------<  102<H>  3.8   |  28  |  3.62<H>    Ca    9.7      14 Dec 2022 04:45  Phos  3.1     12-14  Mg     1.8     12-14    TPro  6.5  /  Alb  1.7<L>  /  TBili  0.3  /  DBili  x   /  AST  5<L>  /  ALT  8<L>  /  AlkPhos  78  12-14        Magnesium, Serum: 1.8 mg/dL (12-14 @ 04:45)  Phosphorus Level, Serum: 3.1 mg/dL (12-14 @ 04:45)    Urine studies    PTH and Vit D:

## 2022-12-14 NOTE — CHART NOTE - NSCHARTNOTEFT_GEN_A_CORE
Reached out to Urology regarding urinary retention, failed TOV on 12/10 and 12/13 despite being on doxazosin, no indication for further workup while inpatient, recommended further ambulation and outpatient urology follow up.

## 2022-12-14 NOTE — PROGRESS NOTE ADULT - SUBJECTIVE AND OBJECTIVE BOX
Podiatry HPI: Patient is seen in ICU bed, resting. He reports he is feeling well, does not have pain in his bilateral extremities. Patient has CAIR boots on and states he has them on 24/7. Denies b/l calf pain. Denies any overnight acute events. Denies constitutional symptoms.     HPI:  62 year old morbid obese male, from [facility], has past medical hx of ambulatory dysfunction, ACD, bipolar disorder, BPH, CKD, HLD, PVD,  COPD not on oxygen, hypothyroidism, RBBB, lymphedema, CHF (last echo 2/22 normal EF, GIDD) was BIBEMS for hypotension 73/37 mmHg and bradycardia (40's). Story obtained from ED provider note as patient is not able to provide any history. Patient received 500 cc NS and one dose of atropine for bradycardia. Patient very somnolent during interview but arousable to verbal stimuli.  (30 Nov 2022 18:39)    Patient admits to  (-) Fevers, (-) Chills, (-) Nausea, (-) Vomiting, (-) Shortness of Breath (-) calf pain (-) chest pain     Medications buPROPion . 75 milliGRAM(s) Oral every 12 hours  chlorhexidine 2% Cloths 1 Application(s) Topical <User Schedule>  collagenase Ointment 1 Application(s) Topical two times a day  doxazosin 1 milliGRAM(s) Oral at bedtime  heparin   Injectable 5000 Unit(s) SubCutaneous every 8 hours  levothyroxine 125 MICROGram(s) Oral daily  pantoprazole   Suspension 40 milliGRAM(s) Oral daily  risperiDONE   Tablet 2 milliGRAM(s) Oral at bedtime  sevelamer carbonate Powder 800 milliGRAM(s) Oral three times a day with meals  simethicone 80 milliGRAM(s) Chew two times a day  valproic  acid Syrup 500 milliGRAM(s) Oral three times a day  zinc oxide 20% Ointment 1 Application(s) Topical two times a day    FHNo pertinent family history in first degree relatives    ,   PMHNo pertinent past medical history    Hypothyroid    Hyperlipidemia    Schizophrenia    Schizoaffective disorder    Ambulatory dysfunction    Anemia    History of BPH    CKD (chronic kidney disease)    Chronic obstructive pulmonary disease (COPD)    Bipolar disorder    Bipolar disorder    Mood disorder    HLD (hyperlipidemia)    BPH (benign prostatic hyperplasia)    Anemia    Chronic diastolic congestive heart failure    Lymphedema    Chronic leg pain       PSHNo significant past surgical history    No significant past surgical history        Labs                          8.0    9.61  )-----------( 279      ( 14 Dec 2022 04:45 )             27.9      12-14    147<H>  |  112<H>  |  51<H>  ----------------------------<  102<H>  3.8   |  28  |  3.62<H>    Ca    9.7      14 Dec 2022 04:45  Phos  3.1     12-14  Mg     1.8     12-14    TPro  6.5  /  Alb  1.7<L>  /  TBili  0.3  /  DBili  x   /  AST  5<L>  /  ALT  8<L>  /  AlkPhos  78  12-14     Vital Signs Last 24 Hrs  T(C): 36.6 (14 Dec 2022 04:38), Max: 37.1 (13 Dec 2022 15:23)  T(F): 97.9 (14 Dec 2022 04:38), Max: 98.8 (13 Dec 2022 15:23)  HR: 87 (14 Dec 2022 13:00) (76 - 95)  BP: 109/51 (14 Dec 2022 14:00) (90/64 - 140/59)  BP(mean): 68 (14 Dec 2022 14:00) (67 - 94)  RR: 27 (14 Dec 2022 13:00) (16 - 38)  SpO2: 95% (14 Dec 2022 13:00) (87% - 97%)    Parameters below as of 13 Dec 2022 19:00  Patient On (Oxygen Delivery Method): nasal cannula  O2 Flow (L/min): 2            WBC Count: 9.61 K/uL (12-14-22 @ 04:45)  WBC Count: 9.12 K/uL (12-13-22 @ 19:30)        ROS unremarkable outside of HPI            WBC Count: 15.84 K/uL *H* (12-09-22 @ 03:55)      PHYSICAL EXAM  Vasc:  DP and PT pulses non palpable due to b/l edema. TG: warm to warm. Pedal hair absent. Bilateral non pitting edema to LE.   Derm: pressure changes noted at bilateral heels. Improved appearance of bilateral extremities on exam. B/L venous stasis changes to skin with hemosiderin deposits. Peau d'orange appearance of skin due to venous stasis, significantly improved today. Edema improved. No purulence or drainage noted at b/l legs on exam. No infected wounds on inspection of b/l lower extremity; no rash noted at b/l legs. Mild IDM noted in all interspaces. Hypertrophic and elongated toenails x 10  Neuro: protective sensation intact  MSK: patient is bed bound        A:  Chronic bilateral venous stasis with hemosiderin deposits in skin    P:   Patient evaluated and Chart reviewed  Inspected b/l extremities for signs of infection, no weeping or open wounds  Continue with IV antibiotics As Per ID  Continue wearing CAIR boots to avoid pressure ulcers as tolerated by patient  Recommend decub precautions  Podiatry to follow while in house  Outpatient nail care as needed  Seen bedside and discussed with Dr. Foster  Discussed with Attending Dr. Navas

## 2022-12-14 NOTE — PROGRESS NOTE ADULT - SUBJECTIVE AND OBJECTIVE BOX
Patient is a 62y old  Male who presents with a chief complaint of Shock, Acute Encephalopathy (14 Dec 2022 16:01)    PATIENT IS SEEN AND EXAMINED IN MEDICAL FLOOR.  SOWMYA [    ]    AYLSIA [   ]      GT [   ]    ALLERGIES:  No Known Allergies      Daily     Daily     VITALS:    Vital Signs Last 24 Hrs  T(C): 36.2 (14 Dec 2022 20:00), Max: 37.1 (13 Dec 2022 23:10)  T(F): 97.2 (14 Dec 2022 20:00), Max: 98.8 (13 Dec 2022 23:10)  HR: 76 (14 Dec 2022 21:00) (72 - 95)  BP: 107/54 (14 Dec 2022 21:00) (90/40 - 138/71)  BP(mean): 71 (14 Dec 2022 21:00) (55 - 97)  RR: 20 (14 Dec 2022 21:00) (16 - 32)  SpO2: 95% (14 Dec 2022 21:00) (87% - 99%)    Parameters below as of 14 Dec 2022 21:00  Patient On (Oxygen Delivery Method): nasal cannula  O2 Flow (L/min): 2      LABS:    CBC Full  -  ( 14 Dec 2022 04:45 )  WBC Count : 9.61 K/uL  RBC Count : 2.75 M/uL  Hemoglobin : 8.0 g/dL  Hematocrit : 27.9 %  Platelet Count - Automated : 279 K/uL  Mean Cell Volume : 101.5 fl  Mean Cell Hemoglobin : 29.1 pg  Mean Cell Hemoglobin Concentration : 28.7 gm/dL  Auto Neutrophil # : x  Auto Lymphocyte # : x  Auto Monocyte # : x  Auto Eosinophil # : x  Auto Basophil # : x  Auto Neutrophil % : x  Auto Lymphocyte % : x  Auto Monocyte % : x  Auto Eosinophil % : x  Auto Basophil % : x      12-14    147<H>  |  112<H>  |  51<H>  ----------------------------<  102<H>  3.8   |  28  |  3.62<H>    Ca    9.7      14 Dec 2022 04:45  Phos  3.1     12-14  Mg     1.8     12-14    TPro  6.5  /  Alb  1.7<L>  /  TBili  0.3  /  DBili  x   /  AST  5<L>  /  ALT  8<L>  /  AlkPhos  78  12-14    CAPILLARY BLOOD GLUCOSE            LIVER FUNCTIONS - ( 14 Dec 2022 04:45 )  Alb: 1.7 g/dL / Pro: 6.5 g/dL / ALK PHOS: 78 U/L / ALT: 8 U/L DA / AST: 5 U/L / GGT: x           Creatinine Trend: 3.62<--, 3.65<--, 3.43<--, 3.46<--, 3.50<--, 3.42<--  I&O's Summary    13 Dec 2022 07:01  -  14 Dec 2022 07:00  --------------------------------------------------------  IN: 1730 mL / OUT: 3850 mL / NET: -2120 mL    14 Dec 2022 07:01  -  14 Dec 2022 22:36  --------------------------------------------------------  IN: 550 mL / OUT: 1025 mL / NET: -475 mL            ET Tube ET Tube  12-01 @ 17:40   Normal Respiratory Ivelisse present  --    Rare polymorphonuclear leukocytes per low power field  Rare Squamous epithelial cells per low power field  No organisms seen per oil power field      .Blood Blood  12-01 @ 00:15   No Growth Final  --  --      Clean Catch Clean Catch (Midstream)  12-01 @ 00:14   No growth  --  --          MEDICATIONS:    MEDICATIONS  (STANDING):  buPROPion . 75 milliGRAM(s) Oral every 12 hours  chlorhexidine 2% Cloths 1 Application(s) Topical <User Schedule>  collagenase Ointment 1 Application(s) Topical two times a day  doxazosin 1 milliGRAM(s) Oral at bedtime  heparin   Injectable 5000 Unit(s) SubCutaneous every 8 hours  levothyroxine 125 MICROGram(s) Oral daily  pantoprazole   Suspension 40 milliGRAM(s) Oral daily  risperiDONE   Tablet 2 milliGRAM(s) Oral at bedtime  sevelamer carbonate Powder 800 milliGRAM(s) Oral three times a day with meals  simethicone 80 milliGRAM(s) Chew two times a day  valproic  acid Syrup 500 milliGRAM(s) Oral three times a day  zinc oxide 20% Ointment 1 Application(s) Topical two times a day      MEDICATIONS  (PRN):      REVIEW OF SYSTEMS:                           ALL ROS DONE [ X   ]    CONSTITUTIONAL:  LETHARGIC [   ], FEVER [   ], UNRESPONSIVE [   ]  CVS:  CP  [   ], SOB, [   ], PALPITATIONS [   ], DIZZYNESS [   ]  RS: COUGH [   ], SPUTUM [   ]  GI: ABDOMINAL PAIN [   ], NAUSEA [   ], VOMITINGS [   ], DIARRHEA [   ], CONSTIPATION [   ]  :  DYSURIA [   ], NOCTURIA [   ], INCREASED FREQUENCY [   ], DRIBLING [   ],  SKELETAL: PAINFUL JOINTS [   ], SWOLLEN JOINTS [   ], NECK ACHE [   ], LOW BACK ACHE [   ],  SKIN : ULCERS [   ], RASH [   ], ITCHING [   ]  CNS: HEAD ACHE [   ], DOUBLE VISION [   ], BLURRED VISION [   ], AMS / CONFUSION [   ], SEIZURES [   ], WEAKNESS [   ],TINGLING / NUMBNESS [   ]    PHYSICAL EXAMINATION:  GENERAL APPEARANCE: NO DISTRESS  HEENT:  NO PALLOR, NO  JVD,  NO   NODES, NECK SUPPLE  CVS: S1 +, S2 +,   RS: AEEB,  OCCASIONAL  RALES +,   NO RONCHI  ABD: SOFT, NT, NO, BS +  EXT: NO PE  SKIN: WARM,   SKELETAL:  ROM ACCEPTABLE  CNS:  AAO X    ,   DEFICITS    RADIOLOGY :      ASSESSMENT :     Renal failure    No pertinent past medical history    Hypothyroid    Hyperlipidemia    Schizophrenia    Schizoaffective disorder    Ambulatory dysfunction    Anemia    History of BPH    CKD (chronic kidney disease)    Chronic obstructive pulmonary disease (COPD)    Bipolar disorder    Bipolar disorder    Mood disorder    HLD (hyperlipidemia)    BPH (benign prostatic hyperplasia)    Anemia    Chronic diastolic congestive heart failure    Lymphedema    Chronic leg pain    No significant past surgical history    No significant past surgical history        PLAN:  HPI:  62 year old morbid obese male, from [facility], has past medical hx of ambulatory dysfunction, ACD, bipolar disorder, BPH, CKD, HLD, PVD,  COPD not on oxygen, hypothyroidism, RBBB, lymphedema, CHF (last echo 2/22 normal EF, GIDD) was BIBEMS for hypotension 73/37 mmHg and bradycardia (40's). Story obtained from ED provider note as patient is not able to provide any history. Patient received 500 cc NS and one dose of atropine for bradycardia. Patient very somnolent during interview but arousable to verbal stimuli.  (30 Nov 2022 18:39)    -      Patient is a 62y old  Male who presents with a chief complaint of Shock, Acute Encephalopathy (14 Dec 2022 16:01)    PATIENT IS SEEN AND EXAMINED IN MEDICAL FLOOR.  SOWMYA [    ]    ALYSIA [   ]      GT [   ]    ALLERGIES:  No Known Allergies      Daily     Daily     VITALS:    Vital Signs Last 24 Hrs  T(C): 36.2 (14 Dec 2022 20:00), Max: 37.1 (13 Dec 2022 23:10)  T(F): 97.2 (14 Dec 2022 20:00), Max: 98.8 (13 Dec 2022 23:10)  HR: 76 (14 Dec 2022 21:00) (72 - 95)  BP: 107/54 (14 Dec 2022 21:00) (90/40 - 138/71)  BP(mean): 71 (14 Dec 2022 21:00) (55 - 97)  RR: 20 (14 Dec 2022 21:00) (16 - 32)  SpO2: 95% (14 Dec 2022 21:00) (87% - 99%)    Parameters below as of 14 Dec 2022 21:00  Patient On (Oxygen Delivery Method): nasal cannula  O2 Flow (L/min): 2      LABS:    CBC Full  -  ( 14 Dec 2022 04:45 )  WBC Count : 9.61 K/uL  RBC Count : 2.75 M/uL  Hemoglobin : 8.0 g/dL  Hematocrit : 27.9 %  Platelet Count - Automated : 279 K/uL  Mean Cell Volume : 101.5 fl  Mean Cell Hemoglobin : 29.1 pg  Mean Cell Hemoglobin Concentration : 28.7 gm/dL  Auto Neutrophil # : x  Auto Lymphocyte # : x  Auto Monocyte # : x  Auto Eosinophil # : x  Auto Basophil # : x  Auto Neutrophil % : x  Auto Lymphocyte % : x  Auto Monocyte % : x  Auto Eosinophil % : x  Auto Basophil % : x      12-14    147<H>  |  112<H>  |  51<H>  ----------------------------<  102<H>  3.8   |  28  |  3.62<H>    Ca    9.7      14 Dec 2022 04:45  Phos  3.1     12-14  Mg     1.8     12-14    TPro  6.5  /  Alb  1.7<L>  /  TBili  0.3  /  DBili  x   /  AST  5<L>  /  ALT  8<L>  /  AlkPhos  78  12-14    CAPILLARY BLOOD GLUCOSE            LIVER FUNCTIONS - ( 14 Dec 2022 04:45 )  Alb: 1.7 g/dL / Pro: 6.5 g/dL / ALK PHOS: 78 U/L / ALT: 8 U/L DA / AST: 5 U/L / GGT: x           Creatinine Trend: 3.62<--, 3.65<--, 3.43<--, 3.46<--, 3.50<--, 3.42<--  I&O's Summary    13 Dec 2022 07:01  -  14 Dec 2022 07:00  --------------------------------------------------------  IN: 1730 mL / OUT: 3850 mL / NET: -2120 mL    14 Dec 2022 07:01  -  14 Dec 2022 22:36  --------------------------------------------------------  IN: 550 mL / OUT: 1025 mL / NET: -475 mL            ET Tube ET Tube  12-01 @ 17:40   Normal Respiratory Ivelisse present  --    Rare polymorphonuclear leukocytes per low power field  Rare Squamous epithelial cells per low power field  No organisms seen per oil power field      .Blood Blood  12-01 @ 00:15   No Growth Final  --  --      Clean Catch Clean Catch (Midstream)  12-01 @ 00:14   No growth  --  --          MEDICATIONS:    MEDICATIONS  (STANDING):  buPROPion . 75 milliGRAM(s) Oral every 12 hours  chlorhexidine 2% Cloths 1 Application(s) Topical <User Schedule>  collagenase Ointment 1 Application(s) Topical two times a day  doxazosin 1 milliGRAM(s) Oral at bedtime  heparin   Injectable 5000 Unit(s) SubCutaneous every 8 hours  levothyroxine 125 MICROGram(s) Oral daily  pantoprazole   Suspension 40 milliGRAM(s) Oral daily  risperiDONE   Tablet 2 milliGRAM(s) Oral at bedtime  sevelamer carbonate Powder 800 milliGRAM(s) Oral three times a day with meals  simethicone 80 milliGRAM(s) Chew two times a day  valproic  acid Syrup 500 milliGRAM(s) Oral three times a day  zinc oxide 20% Ointment 1 Application(s) Topical two times a day      MEDICATIONS  (PRN):      REVIEW OF SYSTEMS:                           ALL ROS DONE [ X   ]    CONSTITUTIONAL:  LETHARGIC [   ], FEVER [   ], UNRESPONSIVE [   ]  CVS:  CP  [   ], SOB, [   ], PALPITATIONS [   ], DIZZYNESS [   ]  RS: COUGH [   ], SPUTUM [   ]  GI: ABDOMINAL PAIN [   ], NAUSEA [   ], VOMITINGS [   ], DIARRHEA [   ], CONSTIPATION [   ]  :  DYSURIA [   ], NOCTURIA [   ], INCREASED FREQUENCY [   ], DRIBLING [   ],  SKELETAL: PAINFUL JOINTS [   ], SWOLLEN JOINTS [   ], NECK ACHE [   ], LOW BACK ACHE [   ],       Patient is a 62y old  Male who presents with a chief complaint of Shock, Acute Encephalopathy (14 Dec 2022 16:01)    PATIENT IS SEEN AND EXAMINED IN ICU.    ALLERGIES:  No Known Allergies    VITALS:    Vital Signs Last 24 Hrs  T(C): 36.2 (14 Dec 2022 20:00), Max: 37.1 (13 Dec 2022 23:10)  T(F): 97.2 (14 Dec 2022 20:00), Max: 98.8 (13 Dec 2022 23:10)  HR: 76 (14 Dec 2022 21:00) (72 - 95)  BP: 107/54 (14 Dec 2022 21:00) (90/40 - 138/71)  BP(mean): 71 (14 Dec 2022 21:00) (55 - 97)  RR: 20 (14 Dec 2022 21:00) (16 - 32)  SpO2: 95% (14 Dec 2022 21:00) (87% - 99%)    Parameters below as of 14 Dec 2022 21:00  Patient On (Oxygen Delivery Method): nasal cannula  O2 Flow (L/min): 2      LABS:    CBC Full  -  ( 14 Dec 2022 04:45 )  WBC Count : 9.61 K/uL  RBC Count : 2.75 M/uL  Hemoglobin : 8.0 g/dL  Hematocrit : 27.9 %  Platelet Count - Automated : 279 K/uL  Mean Cell Volume : 101.5 fl  Mean Cell Hemoglobin : 29.1 pg  Mean Cell Hemoglobin Concentration : 28.7 gm/dL  Auto Neutrophil # : x  Auto Lymphocyte # : x  Auto Monocyte # : x  Auto Eosinophil # : x  Auto Basophil # : x  Auto Neutrophil % : x  Auto Lymphocyte % : x  Auto Monocyte % : x  Auto Eosinophil % : x  Auto Basophil % : x      12-14    147<H>  |  112<H>  |  51<H>  ----------------------------<  102<H>  3.8   |  28  |  3.62<H>    Ca    9.7      14 Dec 2022 04:45  Phos  3.1     12-14  Mg     1.8     12-14    TPro  6.5  /  Alb  1.7<L>  /  TBili  0.3  /  DBili  x   /  AST  5<L>  /  ALT  8<L>  /  AlkPhos  78  12-14    CAPILLARY BLOOD GLUCOSE      LIVER FUNCTIONS - ( 14 Dec 2022 04:45 )  Alb: 1.7 g/dL / Pro: 6.5 g/dL / ALK PHOS: 78 U/L / ALT: 8 U/L DA / AST: 5 U/L / GGT: x           Creatinine Trend: 3.62<--, 3.65<--, 3.43<--, 3.46<--, 3.50<--, 3.42<--  I&O's Summary    13 Dec 2022 07:01  -  14 Dec 2022 07:00  --------------------------------------------------------  IN: 1730 mL / OUT: 3850 mL / NET: -2120 mL    14 Dec 2022 07:01  -  14 Dec 2022 22:36  --------------------------------------------------------  IN: 550 mL / OUT: 1025 mL / NET: -475 mL        ET Tube ET Tube  12-01 @ 17:40   Normal Respiratory Ivelisse present  --    Rare polymorphonuclear leukocytes per low power field  Rare Squamous epithelial cells per low power field  No organisms seen per oil power field      .Blood Blood  12-01 @ 00:15   No Growth Final  --  --      Clean Catch Clean Catch (Midstream)  12-01 @ 00:14   No growth  --  --          MEDICATIONS:    MEDICATIONS  (STANDING):  buPROPion . 75 milliGRAM(s) Oral every 12 hours  chlorhexidine 2% Cloths 1 Application(s) Topical <User Schedule>  collagenase Ointment 1 Application(s) Topical two times a day  doxazosin 1 milliGRAM(s) Oral at bedtime  heparin   Injectable 5000 Unit(s) SubCutaneous every 8 hours  levothyroxine 125 MICROGram(s) Oral daily  pantoprazole   Suspension 40 milliGRAM(s) Oral daily  risperiDONE   Tablet 2 milliGRAM(s) Oral at bedtime  sevelamer carbonate Powder 800 milliGRAM(s) Oral three times a day with meals  simethicone 80 milliGRAM(s) Chew two times a day  valproic  acid Syrup 500 milliGRAM(s) Oral three times a day  zinc oxide 20% Ointment 1 Application(s) Topical two times a day      MEDICATIONS  (PRN):      REVIEW OF SYSTEMS:                           ALL ROS DONE [ X   ]    CONSTITUTIONAL:  LETHARGIC [   ], FEVER [   ], UNRESPONSIVE [   ]  CVS:  CP  [   ], SOB, [   ], PALPITATIONS [   ], DIZZYNESS [   ]  RS: COUGH [   ], SPUTUM [   ]  GI: ABDOMINAL PAIN [   ], NAUSEA [   ], VOMITINGS [   ], DIARRHEA [   ], CONSTIPATION [   ]  :  DYSURIA [   ], NOCTURIA [   ], INCREASED FREQUENCY [   ], DRIBLING [   ],  SKELETAL: PAINFUL JOINTS [   ], SWOLLEN JOINTS [   ], NECK ACHE [   ], LOW BACK ACHE [   ],    PHYSICAL EXAMINATION:  GENERAL APPEARANCE: NO DISTRESS  HEENT:  NO PALLOR, NO  JVD,  NO   NODES, NECK SUPPLE  CVS: S1 +, S2 +,   RS: AEEB,  OCCASIONAL  RALES +,   NO RONCHI  ABD: SOFT, NT, NO, BS +  EXT: PE +  SKIN: WARM,   SKELETAL:  ROM ACCEPTABLE  CNS:  AAO X 2    RADIOLOGY :    REVIEWED    ASSESSMENT :       PLAN:  HPI:  62 year old morbid obese male, from [facility], has past medical hx of ambulatory dysfunction, ACD, bipolar disorder, BPH, CKD, HLD, PVD,  COPD not on oxygen, hypothyroidism, RBBB, lymphedema, CHF (last echo 2/22 normal EF, GIDD) was BIBEMS for hypotension 73/37 mmHg and bradycardia (40's). Story obtained from ED provider note as patient is not able to provide any history. Patient received 500 cc NS and one dose of atropine for bradycardia. Patient very somnolent during interview but arousable to verbal stimuli.  (30 Nov 2022 18:39)      # SEPTIC SHOCK - S/T CELLULITIS OF LOWER EXTREMITY  - S/P IVF, S/P VASOPRESSORS  - S/P ROCEPHIN  - ID CONSULT  - CRITICAL CARE CONSULT    # ACUTE ON CHRONIC HYPOXIC RESPIRATORY FAILURE S/T PULMONARY EDEMA - IMPROVED  # UNDERLYING COPD  - ON PRN O2  - S/P HIGH FLOW NASAL CANNULA  - S/P CHEST PT  - CRITICAL CARE EVALUATION    # ACUTE METABOLIC ENCEPHALOPATHY S/T ? UREMIA  # HX OF SEIZURE DX, W/ QUESTIONABLE SPASMODIC MOVEMENT - RESOLVED  - S/P EEG - NO EPILEPTIFORM PATTERN  - ON VALPROIC ACID  - NEUROLOGY CONSULT  - CRITICAL CARE CONSULT    # SHEYLA ON CKD   # URINARY RETENTION, BPH  - HATFIELD PLACED  - STRICT IS AND OS  - AVOID NEPHROTOXIC AGENTS  - MONITOR CR  - PATIENT REQUIRED SHILEY PLACEMENT AND HD ON 12/2 AND 12/3  - NOW S/P EMERGENT HD  - NEPHROLOGY CONSULT  - UROLOGY CONSULT    # AMBULATORY DYSFUNCTION, IMPAIRED GAIT S/T OA, PVD, PERIPHERAL NEUROPATHY  - NOTED PT EVAL - TG    # SINUS BRADYCARDIA  - TTE - NORMAL LV EF    # HAGMA - RESOLVED, NEPHROLOGY CONSULT IN PROGRESS    # HYPERNATREMIA - SUSPECTED HYPERVOLEMIC HYPERNATREMIA - ENCOURAGED PO FREE WATER INTAKE - RESOLVED    # DYSPHAGIA - ON MODIFIED DIET PER SWALLOW EVAL    # ANEMIA OF CHRONIC DISEASE - MONITOR HGB, TRANSFUSION THRESHOLD HGB < 8    # HYPOTHYROIDISM - ON LEVOTHYROXINE    # PRESSURE ULCERS  - PATIENT IS HIGH RISK FOR PRESSURE ULCERS DESPITE PREVENTIVE MEASURES IN PLACE  - WOUND CARE CONSULT    # GI AND DVT PPX

## 2022-12-14 NOTE — DISCHARGE NOTE PROVIDER - [email or web address]_GEN_ALL_CORE_FT
acetaminophen 325 mg oral tablet: 2 tab(s) orally every 6 hours, As needed, Mild Pain (1 - 3), Moderate Pain (4 - 6)  buPROPion 150 mg/24 hours (XL) oral tablet, extended release: 1 tab(s) orally once a day  calcitriol 0.25 mcg oral capsule: 1 cap(s) orally once a day  folic acid 1 mg oral tablet: 1 tab(s) orally once a day  ipratropium-albuterol 0.5 mg-2.5 mg/3 mL inhalation solution: 3 milliliter(s) inhaled every 6 hours  Proscar 5 mg oral tablet: 1 tab(s) orally once a day  RisperDAL 2 mg oral tablet: 1 tab(s) orally once a day 8pm  Simbrinza 1%- 0.2% ophthalmic suspension: 1 drop(s) to each affected eye 2 times a day Right eye  Synthroid 125 mcg (0.125 mg) oral tablet: 1 tab(s) orally once a day  tamsulosin 0.4 mg oral capsule: 1 cap(s) orally once a day  Vitamin D3 25 mcg (1000 intl units) oral tablet: 1 tab(s) orally once a day

## 2022-12-14 NOTE — PROGRESS NOTE ADULT - ASSESSMENT
62 year old morbid obese male, from [facility], has past medical hx of ambulatory dysfunction, ACD, bipolar disorder, BPH, CKD, HLD, PVD, COPD not on oxygen, hypothyroidism, RBBB, lymphedema, CHF (last echo 2/22 normal EF, GIDD) was BIBEMS for hypotension 73/37 mmHg and bradycardia (40's). Patient admitted to ICU for shock and acute encephalopathy.     DX:  - Shock septic vs hypovolemia  - LE cellulitis   - Acute encephalopathy   - Sinus bradycardia  - HAGMA   - SHEYLA on CKD  - Anemia   - COPD not on oxygen at home   - HLD   - BPH  - Hypothyroidism   - Mood disorder       =================== Neuro============================  #Acute Encephalopathy; likely in the setting of acidemia, infection, uremia    Lethargic, fighting vent    Pain meds with Tylenol   CT Head negative for acute bleed/swelling  Awake and alert X3 back at baseline    #Seizure  - Home med includes valproic acid  - Patient is having repeated muscle spasms, improved on Versed but not fully stopped  - Still having muscle movements after dialysis which brought down BUN  - Started propofol instead of precedex  - Valproic acid level low, likely due to missed doses when patient was originally admitted.  - DC keppra per neuro recs, prefer valproate as less nephrotoxic  - spot EEG 12/3 - moderate generalized slowing  - 24 hr EEG (12/4)-no change, No epileptiform pattern or seizure seen.  - Valproic Acid to 750mg BID    ================= Cardiovascular==========================  Sinus bradycardia  - EKG sinus bradycardia, old RBBB  - TTE 12/1 - LVEF 60-65%  - trops neg  - Given normal EF, no changes in EKG, cardiogenic shock less likely    Hypotension  - off pressers      ================- Pulm=================================  COPD  - Patient with hx of COPD not in exacerbation   - Chest X ray Showed bilateral patchy infiltrates, no concern for fluid overload    - CXR 12/7 showed severe R Atelectasis, likely due to mucous plugging, chest PT, R side elevated  -CXR 12/7 3PM showed improvement with chest PT  -CXR 12/8 showed worsening edema, fluids stopped,   - Patient on high flow  - Oxygen supplementation as needed to maintain SpO2 of 88-92%      ==================ID===================================  Shock  - Concern for sepsis vs hypovolemic   - Empirical treatment with CTX for LE cellulitis and for pseudomonal coverage  - S/p 4L NS   - Started on pressors   - Off Pressors  F/u lactate 1.4, procalcitonin, Bcx, Ucx, CK NGTD  - ID consulted Dr Nolasco   - Completed CTX    ================= Nephro================================  SHEYLA on CKD  - On admission, patient with Cr 6.4>>5.6, baseline 3.7; stable at baseline today  - Worsening CKD most likely a combination of obstructive vs pre- renal   - Viera catheter placed in ED with 1000 cc of UO  - -58mL in the past 24 hours  - Avoid nephrotoxins, NSAIDS, ACEI and ARBS  - Monitor BMP daily  - Nephro Dr Valenzuela consulted  - S/p Dialysis 12/2 and 12/3, Creatnine near baseline, mental status improved  - Patient's renal function returned to baseline    HAGMA  - Most likely from uremia   - Nephro Dr Valenzuela consulted  - Resolved    Hypernatremia  Likely hypervolemic hypernatremia, given fluid overload, and patient's refractory Na secondary to fluid.  Encourage PO water intake      =================GI====================================  Pureed Diet  Resume psych PO medications- Wellbutrin  Swallow evaluation  BM today, regular    ================ Heme==================================  Anemia   - Anemia of chronic disease   - Monitor CBC daily   - No active bleed  - Hgb 7  [ ] 1 unit prBC    =================Endocrine===============================  Hypothyroidism  - On Levothyroxine    ================= Skin/Catheters============================  Peripheral IV lines   Sacral and Posterior Thigh Ulcers, Wound Care Consulted  [ ] TOV today    =================Prophylaxis =============================  DVT prophylaxis with Heparin SQ daily   GI prophylaxis with PPI daily    ==================GOC==================================  FULL CODE    62 year old morbid obese male, from [facility], has past medical hx of ambulatory dysfunction, ACD, bipolar disorder, BPH, CKD, HLD, PVD, COPD not on oxygen, hypothyroidism, RBBB, lymphedema, CHF (last echo 2/22 normal EF, GIDD) was BIBEMS for hypotension 73/37 mmHg and bradycardia (40's). Patient admitted to ICU for shock and acute encephalopathy.     DX:  - Shock septic vs hypovolemia  - LE cellulitis   - Acute encephalopathy   - Sinus bradycardia  - HAGMA   - SHEYLA on CKD  - Anemia   - COPD not on oxygen at home   - HLD   - BPH  - Hypothyroidism   - Mood disorder       =================== Neuro============================  #Acute Encephalopathy; likely in the setting of acidemia, infection, uremia  Awake and alert X3 back at baseline    #Seizure  - Home med includes valproic acid  - Patient is having repeated muscle spasms, improved on Versed but not fully stopped  - Still having muscle movements after dialysis which brought down BUN  - Started propofol instead of precedex  - Valproic acid level low, likely due to missed doses when patient was originally admitted.  - DC keppra per neuro recs, prefer valproate as less nephrotoxic  - spot EEG 12/3 - moderate generalized slowing  - 24 hr EEG (12/4)-no change, No epileptiform pattern or seizure seen.  - Valproic Acid to 750mg BID    ================= Cardiovascular==========================  Sinus bradycardia  - EKG sinus bradycardia, old RBBB  - TTE 12/1 - LVEF 60-65%  - trops neg  - Given normal EF, no changes in EKG, cardiogenic shock less likely    Hypotension  - off pressers      ================- Pulm=================================  COPD  - Patient with hx of COPD not in exacerbation   - Chest X ray Showed bilateral patchy infiltrates, no concern for fluid overload    - CXR 12/7 showed severe R Atelectasis, likely due to mucous plugging, chest PT, R side elevated  -CXR 12/7 3PM showed improvement with chest PT  -CXR 12/8 showed worsening edema, fluids stopped,   - Patient on high flow  - Oxygen supplementation as needed to maintain SpO2 of 88-92%  [ ] Trial Room Air      ==================ID===================================  Shock  - Concern for sepsis vs hypovolemic   - Empirical treatment with CTX for LE cellulitis and for pseudomonal coverage  - S/p 4L NS   - Started on pressors   - Off Pressors  F/u lactate 1.4, procalcitonin, Bcx, Ucx, CK NGTD  - ID consulted Dr Nolasco   - Completed CTX    ================= Nephro================================  Urinary Obstruction  Failed TOV on 12/10 and 12/13, retaining more than 500cc on bladder scan  Urology consulted; recommending ambulation, but no inpatient workup at this time, and outpatient follow up    SHEYLA on CKD  - On admission, patient with Cr 6.4>>5.6, baseline 3.7; stable at baseline today  - Worsening CKD most likely a combination of obstructive vs pre- renal   - Viera catheter placed in ED with 1000 cc of UO  - -58mL in the past 24 hours  - Avoid nephrotoxins, NSAIDS, ACEI and ARBS  - Monitor BMP daily  - Nephro Dr Valenzuela consulted  - S/p Dialysis 12/2 and 12/3, Creatnine near baseline, mental status improved  - Patient's renal function returned to baseline    HAGMA  - Most likely from uremia   - Nephro Dr Valenzuela consulted  - Resolved    Hypernatremia  Likely hypervolemic hypernatremia, given fluid overload, and patient's refractory Na secondary to fluid.  Encourage PO water intake      =================GI====================================  Pureed Diet  Resume psych PO medications- Wellbutrin  Swallow evaluation  BM today, regular    ================ Heme==================================  Anemia   - Anemia of chronic disease   - Monitor CBC daily   - No active bleed  - Hgb 8, s/p pRBC 12/13    =================Endocrine===============================  Hypothyroidism  - On Levothyroxine    ================= Skin/Catheters============================  Peripheral IV lines   Sacral and Posterior Thigh Ulcers, Wound Care Consulted  Viera to be in place at DC    =================Prophylaxis =============================  DVT prophylaxis with Heparin SQ daily   GI prophylaxis with PPI daily    ==================GOC==================================  FULL CODE    62 year old morbid obese male, from [facility], has past medical hx of ambulatory dysfunction, ACD, bipolar disorder, BPH, CKD, HLD, PVD, COPD not on oxygen, hypothyroidism, RBBB, lymphedema, CHF (last echo 2/22 normal EF, GIDD) was BIBEMS for hypotension 73/37 mmHg and bradycardia (40's). Patient admitted to ICU for shock and acute encephalopathy.     DX:  - Shock septic vs hypovolemia  - LE cellulitis   - Acute encephalopathy   - Sinus bradycardia  - HAGMA   - SHEYLA on CKD  - Anemia   - COPD not on oxygen at home   - HLD   - BPH  - Hypothyroidism   - Mood disorder       =================== Neuro============================  #Acute Encephalopathy; likely in the setting of acidemia, infection, uremia  Awake and alert X3 back at baseline    #Seizure  - Home med includes valproic acid  - Patient is having repeated muscle spasms, improved on Versed but not fully stopped  - Still having muscle movements after dialysis which brought down BUN  - Started propofol instead of precedex  - Valproic acid level low, likely due to missed doses when patient was originally admitted.  - DC keppra per neuro recs, prefer valproate as less nephrotoxic  - spot EEG 12/3 - moderate generalized slowing  - 24 hr EEG (12/4)-no change, No epileptiform pattern or seizure seen.  - Valproic Acid to 750mg BID    ================= Cardiovascular==========================  Sinus bradycardia  - EKG sinus bradycardia, old RBBB  - TTE 12/1 - LVEF 60-65%  - trops neg  - Given normal EF, no changes in EKG, cardiogenic shock less likely    Hypotension  - off pressers      ================- Pulm=================================  COPD  - Patient with hx of COPD not in exacerbation   - Chest X ray Showed bilateral patchy infiltrates, no concern for fluid overload    - CXR 12/7 showed severe R Atelectasis, likely due to mucous plugging, chest PT, R side elevated  -CXR 12/7 3PM showed improvement with chest PT  -CXR 12/8 showed worsening edema, fluids stopped,   - Patient on high flow  - Oxygen supplementation as needed to maintain SpO2 of 88-92%  [ ] Trial Room Air      ==================ID===================================  Shock  - Concern for sepsis vs hypovolemic   - Empirical treatment with CTX for LE cellulitis and for pseudomonal coverage  - S/p 4L NS   - Started on pressors   - Off Pressors  F/u lactate 1.4, procalcitonin, Bcx, Ucx, CK NGTD  - ID consulted Dr Nolasco   - Completed CTX    ================= Nephro================================  #Urinary Retention  Failed TOV on 12/10 and 12/13, retaining more than 500cc on bladder scan  Urology consulted; recommending ambulation, but no inpatient workup at this time, and outpatient follow up    # SHEYLA on CKD  - On admission, patient with Cr 6.4>>5.6, baseline 3.7; stable at baseline today  - Worsening CKD most likely a combination of obstructive vs pre- renal   - Viera catheter placed in ED with 1000 cc of UO  - -58mL in the past 24 hours  - Avoid nephrotoxins, NSAIDS, ACEI and ARBS  - Monitor BMP daily  - Nephcolleen Valenzuela consulted  - S/p Dialysis 12/2 and 12/3, Creatnine near baseline, mental status improved  - Patient's renal function returned to baseline    HAGMA  - Most likely from uremia   - Nephcolleen Valenzuela consulted  - Resolved    Hypernatremia  Likely hypervolemic hypernatremia, given fluid overload, and patient's refractory Na secondary to fluid.  Encourage PO water intake      =================GI====================================  Pureed Diet  Resume psych PO medications- Wellbutrin  Swallow evaluation  BM today, regular    ================ Heme==================================  Anemia   - Anemia of chronic disease   - Monitor CBC daily   - No active bleed  - Hgb 8, s/p pRBC 12/13    =================Endocrine===============================  Hypothyroidism  - On Levothyroxine    ================= Skin/Catheters============================  Peripheral IV lines   Sacral and Posterior Thigh Ulcers, Wound Care Consulted  Viera to be in place at DC    =================Prophylaxis =============================  DVT prophylaxis with Heparin SQ daily   GI prophylaxis with PPI daily    ==================GOC==================================  FULL CODE

## 2022-12-14 NOTE — DISCHARGE NOTE PROVIDER - HOSPITAL COURSE
63 y/o male with PMH of CKD ( baseline creat 3.8 ), COPD, PVD, lymphedema, and Bipolar disorder admitted from [facility] after reported altered mental status. EMS was called and found the patient hypotensive ( 73/37 ) and bradycardic ( 40 ). The patient got 1 dose of atropine in the ED and 500cc IVF.  Anion gap of 16. CXR significant only for right basilar atelectasis. The patient had a normal echo in Feb 2022 with the exception of an elevated Pa systolic pressure of 48 mmhg. normal aortic/mitral valves. Grade 1 diastolic dysfunction. He is morbidly obese  and hypoventilates while somnolent. ABG- 7.20/44/97 on 3L. Patient came to ICU for pressor requirements and placed a central line for transduction of CVP ( 12mmhg ) and administration of vasopressors. Started with stress dose steroids or more pointedly steroids for relative adrenal insufficiency in setting of sepsis. The patient has a h/o perirectal lesion which bleed on his last admission. He had a suture placed on general surgery did a procedure. The patient has dramatic edema of the lower extremities with punctate areas of erythema. Dx- hypotension, likely distributive shock with cellulitis. The patient has h/o RBBB and the bradycardia may be slow AFlutter/AFib. Pateint intubated for airway protection as patient was not following commands, likely in the setting of metabolic acidosis/sepsis. Cefazolin was switched to Cefepime for pseudomonal coverage. Wound care consulted, ID Consulted. Nephrology consulted, dialysis recommended in the setting of acidemia, uremia with altered mental status. Vascular surgery placed access. SBT and SAT passed, patient extubated, EEG done; found diffuse hypoactivity, Valproic acid uptitrated, pending trough in AM, Valproic acid levels appropriate, patient required BIPAP overnight, attepmtpted to titrate 12/7 CXR showed massive R Atelectasis, likely due to mucous plugging. Chest PT ordered 12/8 CXR showed improvement in mucous plugging, patient on HFNC, NGT removed, patient started pureed diet. 12/9 Patient tolerated bipap, titrated now on high flow, normal diet, 12/10 Patient transitioned to home dose valproic acid, patient failed TOV  12/12 Patient transitioned to NC TOV 12/13 failed. Urology consulted; recommended ambulation, no inpatient workup at this time, recommended outpatient follow up.    Pt is stable for discharge. Pt has been advised to follow up as outpatient. Case has joyce discussed with the attending. This is just a summary of the case. For further information please refer to pt. chart document.

## 2022-12-14 NOTE — PROGRESS NOTE ADULT - SUBJECTIVE AND OBJECTIVE BOX
INTERVAL HPI/OVERNIGHT EVENTS:     PRESSORS: [ ] YES [ ] NO  WHICH:    ANTIBIOTICS:                  DATE STARTED:  ANTIBIOTICS:                  DATE STARTED:  ANTIBIOTICS:                  DATE STARTED:    Antimicrobial:    Cardiovascular:  doxazosin 1 milliGRAM(s) Oral at bedtime    Pulmonary:    Hematalogic:  heparin   Injectable 5000 Unit(s) SubCutaneous every 8 hours    Other:  buPROPion . 75 milliGRAM(s) Oral every 12 hours  chlorhexidine 2% Cloths 1 Application(s) Topical <User Schedule>  collagenase Ointment 1 Application(s) Topical two times a day  levothyroxine 125 MICROGram(s) Oral daily  pantoprazole   Suspension 40 milliGRAM(s) Oral daily  risperiDONE   Tablet 2 milliGRAM(s) Oral at bedtime  sevelamer carbonate Powder 800 milliGRAM(s) Oral three times a day with meals  simethicone 80 milliGRAM(s) Chew two times a day  valproic  acid Syrup 500 milliGRAM(s) Oral three times a day  zinc oxide 20% Ointment 1 Application(s) Topical two times a day      Drug Dosing Weight  Height (cm): 177.8 (30 Nov 2022 18:45)  Weight (kg): 113.9 (30 Nov 2022 18:45)  BMI (kg/m2): 36 (30 Nov 2022 18:45)  BSA (m2): 2.3 (30 Nov 2022 18:45)    CENTRAL LINE: [ ] YES [ ] NO  LOCATION:   DATE INSERTED:  REMOVE: [ ] YES [ ] NO  EXPLAIN:    HATFIELD: [ ] YES [ ] NO    DATE INSERTED:  REMOVE:  [ ] YES [ ] NO  EXPLAIN:    A-LINE:  [ ] YES [ ] NO  LOCATION:   DATE INSERTED:  REMOVE:  [ ] YES [ ] NO  EXPLAIN:    PMH/Social Hx/Fam Hx -reviewed admission note, no change since admission  PAST MEDICAL & SURGICAL HISTORY:  Hypothyroid      Hyperlipidemia      Ambulatory dysfunction      Anemia      History of BPH      CKD (chronic kidney disease)      Chronic obstructive pulmonary disease (COPD)      Bipolar disorder      HLD (hyperlipidemia)      BPH (benign prostatic hyperplasia)      Chronic diastolic congestive heart failure      Lymphedema      No significant past surgical history        Heart faliure: acute [ ] chronic [ ] acute or chronic [ ] diastolic [ ] systolic [ ] combied systolic and diastolic[ ]  SHEYLA: ATN[ ] renal medullary necrosis [ ] CKD I [ ]CKDII [ ]CKD III [ ]CKD IV [ ]CKD V [ ]Other pathological lesions [ ]  Abdominal Nutrition Status: malnutrition [ ] cachexia [ ] morbid obesity/BMI=40 [ ] Supplement ordered [___________]     T(C): 36.6 (12-14-22 @ 04:38), Max: 37.1 (12-13-22 @ 15:23)  HR: 83 (12-14-22 @ 09:00)  BP: 112/57 (12-14-22 @ 09:00)  BP(mean): 72 (12-14-22 @ 09:00)  ABP: --  ABP(mean): --  RR: 24 (12-14-22 @ 09:00)  SpO2: 96% (12-14-22 @ 08:00)  Wt(kg): --          12-13 @ 07:01  -  12-14 @ 07:00  --------------------------------------------------------  IN: 1730 mL / OUT: 3850 mL / NET: -2120 mL            PHYSICAL EXAM:    GENERAL: [ ]NAD, [ ]well-groomed, [ ]well-developed  HEAD:  [ ]Atraumatic, [ ]Normocephalic  EYES: [ ]EOMI, [ ]PERRLA, [ ]conjunctiva and sclera clear  ENMT: [ ]No tonsillar erythema, exudates, or enlargement; [ ]Moist mucous membranes, [ ]Good dentition, [ ]No lesions  NECK: [ ]Supple, normal appearance, [ ]No JVD; [ ]Normal thyroid; [ ]Trachea midline  NERVOUS SYSTEM:  [ ]Alert & Oriented X3, [ ]Good concentration; [ ]Motor Strength 5/5 B/L upper and lower extremities; [ ]DTRs 2+ intact and symmetric  CHEST/LUNG: [ ]No chest deformity; [ ]Normal percussion bilaterally; [ ]No rales, rhonchi, wheezing; [ ]Crackles at bases  HEART: [ ]Regular rate and rhythm; [ ]No murmurs, rubs, or gallops  ABDOMEN: [ ]Soft, Nontender, Nondistended; [ ]Bowel sounds present  EXTREMITIES:  [ ]2+ Peripheral Pulses, [ ]No clubbing, cyanosis, or edema [ ]Bilat lower extremity edema  LYMPH: [ ]No lymphadenopathy noted  SKIN: [ ]No rashes or lesions; [ ]Good capillary refill      LABS:  CBC Full  -  ( 14 Dec 2022 04:45 )  WBC Count : 9.61 K/uL  RBC Count : 2.75 M/uL  Hemoglobin : 8.0 g/dL  Hematocrit : 27.9 %  Platelet Count - Automated : 279 K/uL  Mean Cell Volume : 101.5 fl  Mean Cell Hemoglobin : 29.1 pg  Mean Cell Hemoglobin Concentration : 28.7 gm/dL  Auto Neutrophil # : x  Auto Lymphocyte # : x  Auto Monocyte # : x  Auto Eosinophil # : x  Auto Basophil # : x  Auto Neutrophil % : x  Auto Lymphocyte % : x  Auto Monocyte % : x  Auto Eosinophil % : x  Auto Basophil % : x    12-14    147<H>  |  112<H>  |  51<H>  ----------------------------<  102<H>  3.8   |  28  |  3.62<H>    Ca    9.7      14 Dec 2022 04:45  Phos  3.1     12-14  Mg     1.8     12-14    TPro  6.5  /  Alb  1.7<L>  /  TBili  0.3  /  DBili  x   /  AST  5<L>  /  ALT  8<L>  /  AlkPhos  78  12-14            RADIOLOGY & ADDITIONAL STUDIES REVIEWED:      [ ]GOALS OF CARE DISCUSSION WITH PATIENT/FAMILY/PROXY:    CRITICAL CARE TIME SPENT: 35 minutes INTERVAL HPI/OVERNIGHT EVENTS: Failed TOV    Cardiovascular:  doxazosin 1 milliGRAM(s) Oral at bedtime    Pulmonary:    Hematalogic:  heparin   Injectable 5000 Unit(s) SubCutaneous every 8 hours    Other:  buPROPion . 75 milliGRAM(s) Oral every 12 hours  chlorhexidine 2% Cloths 1 Application(s) Topical <User Schedule>  collagenase Ointment 1 Application(s) Topical two times a day  levothyroxine 125 MICROGram(s) Oral daily  pantoprazole   Suspension 40 milliGRAM(s) Oral daily  risperiDONE   Tablet 2 milliGRAM(s) Oral at bedtime  sevelamer carbonate Powder 800 milliGRAM(s) Oral three times a day with meals  simethicone 80 milliGRAM(s) Chew two times a day  valproic  acid Syrup 500 milliGRAM(s) Oral three times a day  zinc oxide 20% Ointment 1 Application(s) Topical two times a day      Drug Dosing Weight  Height (cm): 177.8 (30 Nov 2022 18:45)  Weight (kg): 113.9 (30 Nov 2022 18:45)  BMI (kg/m2): 36 (30 Nov 2022 18:45)  BSA (m2): 2.3 (30 Nov 2022 18:45)    CENTRAL LINE: [ ] YES [ ] NO  LOCATION:   DATE INSERTED:  REMOVE: [ ] YES [ ] NO  EXPLAIN:    HATFIELD: [ ] YES [ ] NO    DATE INSERTED:  REMOVE:  [ ] YES [ ] NO  EXPLAIN:    A-LINE:  [ ] YES [ ] NO  LOCATION:   DATE INSERTED:  REMOVE:  [ ] YES [ ] NO  EXPLAIN:    PMH/Social Hx/Fam Hx -reviewed admission note, no change since admission  PAST MEDICAL & SURGICAL HISTORY:  Hypothyroid      Hyperlipidemia      Ambulatory dysfunction      Anemia      History of BPH      CKD (chronic kidney disease)      Chronic obstructive pulmonary disease (COPD)      Bipolar disorder      HLD (hyperlipidemia)      BPH (benign prostatic hyperplasia)      Chronic diastolic congestive heart failure      Lymphedema      No significant past surgical history       T(C): 36.6 (12-14-22 @ 04:38), Max: 37.1 (12-13-22 @ 15:23)  HR: 83 (12-14-22 @ 09:00)  BP: 112/57 (12-14-22 @ 09:00)  BP(mean): 72 (12-14-22 @ 09:00)  ABP: --  ABP(mean): --  RR: 24 (12-14-22 @ 09:00)  SpO2: 96% (12-14-22 @ 08:00)  Wt(kg): --          12-13 @ 07:01  -  12-14 @ 07:00  --------------------------------------------------------  IN: 1730 mL / OUT: 3850 mL / NET: -2120 mL            PHYSICAL EXAM:  GENERAL: NAD, eating, sitting in bed  HEAD:  Atraumatic, Normocephalic  EYES: EOMI, PERRLA, conjunctiva and sclera clear  NECK: Supple, normal appearance,  NERVOUS SYSTEM:  Alert & Oriented X3,  moves all extremities spontaneously  CHEST/LUNG: Lungs clear to auscultation bilaterally, No rales, rhonchi, wheezing   HEART: Regular rate and rhythm; No murmurs, rubs, or gallops  ABDOMEN: Soft, Nontender, Distended; Bowel sounds present  EXTREMITIES: Bilateral lower extremity edema      LABS:  CBC Full  -  ( 14 Dec 2022 04:45 )  WBC Count : 9.61 K/uL  RBC Count : 2.75 M/uL  Hemoglobin : 8.0 g/dL  Hematocrit : 27.9 %  Platelet Count - Automated : 279 K/uL  Mean Cell Volume : 101.5 fl  Mean Cell Hemoglobin : 29.1 pg  Mean Cell Hemoglobin Concentration : 28.7 gm/dL  Auto Neutrophil # : x  Auto Lymphocyte # : x  Auto Monocyte # : x  Auto Eosinophil # : x  Auto Basophil # : x  Auto Neutrophil % : x  Auto Lymphocyte % : x  Auto Monocyte % : x  Auto Eosinophil % : x  Auto Basophil % : x    12-14    147<H>  |  112<H>  |  51<H>  ----------------------------<  102<H>  3.8   |  28  |  3.62<H>    Ca    9.7      14 Dec 2022 04:45  Phos  3.1     12-14  Mg     1.8     12-14    TPro  6.5  /  Alb  1.7<L>  /  TBili  0.3  /  DBili  x   /  AST  5<L>  /  ALT  8<L>  /  AlkPhos  78  12-14            RADIOLOGY & ADDITIONAL STUDIES REVIEWED:      [ ]GOALS OF CARE DISCUSSION WITH PATIENT/FAMILY/PROXY:    CRITICAL CARE TIME SPENT: 35 minutes

## 2022-12-14 NOTE — DISCHARGE NOTE PROVIDER - CARE PROVIDER_API CALL
Serina Phillips)  Internal Medicine  [phone] [place] [address]  Phone: [phone]  Fax: [phone]  Follow Up Time: 1 week

## 2022-12-14 NOTE — CONSULT NOTE ADULT - ASSESSMENT
Confidential Drug Utilization Report  Search Terms: Bear Rhoades, 1960Search Date: 12/14/2022 09:17:13 AM  The Drug Utilization Report below displays all of the controlled substance prescriptions, if any, that your patient has filled in the last twelve months. The information displayed on this report is compiled from pharmacy submissions to the Department, and accurately reflects the information as submitted by the pharmacies.    This report was requested by: Parisa Rosales | Reference #: [id number]    You have not added a JOYCELYN number. Keeping your JOYCELYN number(s) up to date on the My JOYCELYN # page will enable the separation of your prescriptions from others in the search results.    Others' Prescriptions  Patient Name: Bear RhoadesBirth Date: 1960  Address: [address] [place] [id number]: Male  Rx Written	Rx Dispensed	Drug	Quantity	Days Supply	Prescriber Name	Prescriber Joycelyn #	Payment Method	Dispenser  06/01/2022	06/01/2022	hydromorphone 2 mg tablet	30	7	Owen Phillips	[id number]	Cash	[facility]    Patient Name: Bear RhoadesBirth Date: 1960  Address: [address] [place], [place] [address]Xuc: Male  Rx Written	Rx Dispensed	Drug	Quantity	Days Supply	Prescriber Name	Prescriber Joycelyn #	Payment Method	Dispenser  07/14/2022	07/14/2022	morphine sulf er 30 mg tablet	90	30	Owen Phillips	[id number]	Medicaid	[facility]  12/27/2021	01/17/2022	hydromorphone 4 mg tablet	30	30	Owen Phillips	[id number]	Cash	[facility]    Patient Name: Bear hRoadesBirth Date: 1960  Address: [address] [place] [id number]: Male  Rx Written	Rx Dispensed	Drug	Quantity	Days Supply	Prescriber Name	Prescriber Joycelyn #	Payment Method	Dispenser  04/22/2022	06/20/2022	hydromorphone 2 mg tablet	30	10	Shiraz Madrigal	[id number]	[place]	[facility]  06/20/2022	06/20/2022	hydromorphone 2 mg tablet	15	5	Owen Phillips	[id number]	Cash	[facility]  05/09/2022	06/02/2022	hydromorphone 2 mg tablet	30	10	Jacqueline Owen R	[id number]	Cash	[facility]  05/09/2022	05/22/2022	hydromorphone 2 mg tablet	30	10	Jacqueline Owen HARRIS	[id number]	Cash	[facility]  05/09/2022	05/10/2022	hydromorphone 2 mg tablet	30	10	Jacqueline Owen [place]	[id number]	Cash	[facility]  04/22/2022	05/02/2022	hydromorphone 2 mg tablet	30	10	Krupochkin, Shiraz	[id number]	[place]	[facility]  04/22/2022	04/22/2022	hydromorphone 2 mg tablet	30	10	Krupochkin, Shiraz	[id number]	[place]	[facility]  03/18/2022	04/08/2022	hydromorphone 2 mg tablet	30	10	Krupochkin, Shiraz	[id number]	[place]	[facility]  03/18/2022	03/29/2022	hydromorphone 2 mg tablet	30	10	Krupochkin, Shiraz	[id number]	[place]	[facility]  03/18/2022	03/18/2022	hydromorphone 2 mg tablet	30	10	Krupochkin, Shiraz	[id number]	[place]	[facility]  02/19/2022	03/11/2022	hydromorphone 2 mg tablet	30	10	Krupochkin, Shiraz	[id number]	[place]	[facility]  02/19/2022	03/01/2022	hydromorphone 2 mg tablet	30	10	Krupochkin, Shiraz	[id number]	[place]	[facility]  02/19/2022	02/19/2022	hydromorphone 2 mg tablet	30	10	Krupochkin, Shiraz	[id number]	[place]	[facility]  * - Drugs marked with an asterisk are compound drugs. If the compound drug is made up of more than one controlled substance, then each controlled substance will be a separate row in the table.

## 2022-12-14 NOTE — PROGRESS NOTE ADULT - ASSESSMENT
1. SHEYLA due to ATN.  First HD 12/2. s/p shiley on 12/2. dialysis started since Scr not improved and remained at 5s.  -Scr stable at 3.5mg/dl and uchanged with renal recovery.  Last HD on saturday (12/3 and 12/2).  -Adjust meds to eGFR and avoid IV Gadolinium contrast,NSAIDs, and phosphate enema.  -Monitor I/O's daily.   -Monitor SMA daily.  2. CKD stage 4 most likely due to ischemic nephropathy  -baseline scr around 3.7mg/dL in july 2022.  -Keep patient euvolemic and renal diet  -Avoid Nephrotoxic Meds/ Agents such as (NSAIDs, IV contrast, Aminoglycosides such as gentamicin, -Gadolinium contrast, Phosphate containing enemas, etc..)  -Adjust Medications according to eGFR  3. Hypotension possible sepsis  -bp is stable and off pressor.  4. Mineral Bone Disease:  -phos is improving and PTH intact is 400.  -continue calcitriol 0.25mcg daily.  5. HAGMA and respiratory acidosis:  -abg noted.   -patient has respiratory acidosis and managed by ICU.   6. Bradycardia due to unclear etiology  -stable.  -TSH is nl  7. Pulm:  -s/p intubated on 11/30 for worsening respiratory support and extubated.  -plan as per MICU.   8. Anemia of ESRD:  -iron panel and ferritin is acceptable.   -F/u CBC daily  -transfuse if HB < 7.0.  9. Encephalopathy:  -video EEG shows no seizures.    10. Hypernatremia due to water deficit and insensible losses. Pt is clinically hypervolemic.   -Na improving and continue free water 200cc 6hrs. caution with IVFs since CXR shows congestion.   -Monitor I/O's. Check Serum Na Daily. Avoid high solute intake diet and sodium bicarbonate infuse. Avoid overcorrection of NA (8-10meq/day)  10. Hypokalemia:  -stable.  -monitor K.     Patient is critically ill. Time Spent: 35mins.  Discussed the assessment and plan with ICU Team/Nurse

## 2022-12-15 NOTE — PROGRESS NOTE ADULT - NUTRITIONAL ASSESSMENT
This patient has been assessed with a concern for Malnutrition and has been determined to have a diagnosis/diagnoses of Morbid obesity (BMI > 40).    This patient is being managed with:   Diet NPO with Tube Feed-  Tube Feeding Modality: Nasogastric  Nepro with Carb Steady  Total Volume for 24 Hours (mL): 600  Continuous  Starting Tube Feed Rate {mL per Hour}: 10  Increase Tube Feed Rate by (mL): 10     Every 4 hours  Until Goal Tube Feed Rate (mL per Hour): 25  Tube Feed Duration (in Hours): 24  Tube Feed Start Time: 09:15  Entered: Dec  2 2022  9:07AM    
This patient has been assessed with a concern for Malnutrition and has been determined to have a diagnosis/diagnoses of Morbid obesity (BMI > 40).    This patient is being managed with:   Diet Pureed-  Consistent Carbohydrate {Evening Snacks}  DASH/TLC {Sodium & Cholesterol Restricted}  Mildly Thick Liquids (MILDTHICKLIQS)  For patients receiving Renal Replacement - No Protein Restr No Conc K No Conc Phos Low Sodium (RENAL)  Entered: Dec  9 2022 11:42AM    
This patient has been assessed with a concern for Malnutrition and has been determined to have a diagnosis/diagnoses of Morbid obesity (BMI > 40).    This patient is being managed with:   Diet NPO with Tube Feed-  Tube Feeding Modality: Nasogastric  Nepro with Carb Steady  Total Volume for 24 Hours (mL): 600  Continuous  Starting Tube Feed Rate {mL per Hour}: 10  Increase Tube Feed Rate by (mL): 10     Every 4 hours  Until Goal Tube Feed Rate (mL per Hour): 25  Tube Feed Duration (in Hours): 24  Tube Feed Start Time: 09:15  Entered: Dec  2 2022  9:07AM    
This patient has been assessed with a concern for Malnutrition and has been determined to have a diagnosis/diagnoses of Morbid obesity (BMI > 40).    This patient is being managed with:   Diet Pureed-  DASH/TLC {Sodium & Cholesterol Restricted}  Mildly Thick Liquids (MILDTHICKLIQS)  No Concentrated Potassium  No Concentrated Phosphorus  Supplement Feeding Modality:  Oral  Ensure Pudding Cans or Servings Per Day:  1       Frequency:  Two Times a day  Entered: Dec 13 2022 10:11AM    
This patient has been assessed with a concern for Malnutrition and has been determined to have a diagnosis/diagnoses of Morbid obesity (BMI > 40).    This patient is being managed with:   Diet NPO with Tube Feed-  Tube Feeding Modality: Nasogastric  Nepro with Carb Steady  Total Volume for 24 Hours (mL): 600  Continuous  Starting Tube Feed Rate {mL per Hour}: 10  Increase Tube Feed Rate by (mL): 10     Every 4 hours  Until Goal Tube Feed Rate (mL per Hour): 25  Tube Feed Duration (in Hours): 24  Tube Feed Start Time: 09:15  Entered: Dec  2 2022  9:07AM    
This patient has been assessed with a concern for Malnutrition and has been determined to have a diagnosis/diagnoses of Morbid obesity (BMI > 40).    This patient is being managed with:   Diet NPO with Tube Feed-  Tube Feeding Modality: Nasogastric  Nepro with Carb Steady  Total Volume for 24 Hours (mL): 600  Continuous  Starting Tube Feed Rate {mL per Hour}: 10  Increase Tube Feed Rate by (mL): 10     Every 4 hours  Until Goal Tube Feed Rate (mL per Hour): 25  Tube Feed Duration (in Hours): 24  Tube Feed Start Time: 09:15  Entered: Dec  2 2022  9:07AM    
This patient has been assessed with a concern for Malnutrition and has been determined to have a diagnosis/diagnoses of Morbid obesity (BMI > 40).    This patient is being managed with:   Diet Pureed-  Consistent Carbohydrate {Evening Snacks}  DASH/TLC {Sodium & Cholesterol Restricted}  Mildly Thick Liquids (MILDTHICKLIQS)  For patients receiving Renal Replacement - No Protein Restr No Conc K No Conc Phos Low Sodium (RENAL)  Entered: Dec  9 2022 11:42AM    
This patient has been assessed with a concern for Malnutrition and has been determined to have a diagnosis/diagnoses of Morbid obesity (BMI > 40).    This patient is being managed with:   Diet NPO with Tube Feed-  Tube Feeding Modality: Nasogastric  Nepro with Carb Steady  Total Volume for 24 Hours (mL): 600  Continuous  Starting Tube Feed Rate {mL per Hour}: 10  Increase Tube Feed Rate by (mL): 10     Every 4 hours  Until Goal Tube Feed Rate (mL per Hour): 25  Tube Feed Duration (in Hours): 24  Tube Feed Start Time: 09:15  Entered: Dec  2 2022  9:07AM    
This patient has been assessed with a concern for Malnutrition and has been determined to have a diagnosis/diagnoses of Morbid obesity (BMI > 40).    This patient is being managed with:   Diet NPO with Tube Feed-  Tube Feeding Modality: Nasogastric  Nepro with Carb Steady  Total Volume for 24 Hours (mL): 600  Continuous  Starting Tube Feed Rate {mL per Hour}: 10  Increase Tube Feed Rate by (mL): 10     Every 4 hours  Until Goal Tube Feed Rate (mL per Hour): 25  Tube Feed Duration (in Hours): 24  Tube Feed Start Time: 09:15  Entered: Dec  2 2022  9:07AM    
This patient has been assessed with a concern for Malnutrition and has been determined to have a diagnosis/diagnoses of Morbid obesity (BMI > 40).    This patient is being managed with:   Diet NPO with Tube Feed-  Tube Feeding Modality: Nasogastric  Nepro with Carb Steady  Total Volume for 24 Hours (mL): 960  Continuous  Starting Tube Feed Rate {mL per Hour}: 10  Increase Tube Feed Rate by (mL): 10     Every 6 hours  Until Goal Tube Feed Rate (mL per Hour): 40  Tube Feed Duration (in Hours): 24  Tube Feed Start Time: 15:05  Entered: Dec  8 2022  3:01PM    
This patient has been assessed with a concern for Malnutrition and has been determined to have a diagnosis/diagnoses of Morbid obesity (BMI > 40).    This patient is being managed with:   Diet Pureed-  Consistent Carbohydrate {Evening Snacks}  DASH/TLC {Sodium & Cholesterol Restricted}  Mildly Thick Liquids (MILDTHICKLIQS)  For patients receiving Renal Replacement - No Protein Restr No Conc K No Conc Phos Low Sodium (RENAL)  Entered: Dec  9 2022 11:42AM    
This patient has been assessed with a concern for Malnutrition and has been determined to have a diagnosis/diagnoses of Morbid obesity (BMI > 40).    This patient is being managed with:   Diet Pureed-  Consistent Carbohydrate {Evening Snacks}  DASH/TLC {Sodium & Cholesterol Restricted}  Mildly Thick Liquids (MILDTHICKLIQS)  For patients receiving Renal Replacement - No Protein Restr No Conc K No Conc Phos Low Sodium (RENAL)  Entered: Dec  9 2022 11:42AM    
This patient has been assessed with a concern for Malnutrition and has been determined to have a diagnosis/diagnoses of Morbid obesity (BMI > 40).    This patient is being managed with:   Diet Pureed-  DASH/TLC {Sodium & Cholesterol Restricted}  Mildly Thick Liquids (MILDTHICKLIQS)  No Concentrated Potassium  No Concentrated Phosphorus  Supplement Feeding Modality:  Oral  Ensure Pudding Cans or Servings Per Day:  1       Frequency:  Two Times a day  Entered: Dec 13 2022 10:11AM    Diet Pureed-  Consistent Carbohydrate {Evening Snacks}  DASH/TLC {Sodium & Cholesterol Restricted}  Mildly Thick Liquids (MILDTHICKLIQS)  For patients receiving Renal Replacement - No Protein Restr No Conc K No Conc Phos Low Sodium (RENAL)  Entered: Dec  9 2022 11:42AM    The following pending diet order is being considered for treatment of Morbid obesity (BMI > 40):null

## 2022-12-15 NOTE — DISCHARGE NOTE NURSING/CASE MANAGEMENT/SOCIAL WORK - PATIENT PORTAL LINK FT
You can access the FollowMyHealth Patient Portal offered by [facility] by registering at the following website: [email or web address]. By joining [facility]’s FollowMyHealth portal, you will also be able to view your health information using other applications (apps) compatible with our system.

## 2022-12-15 NOTE — PROGRESS NOTE ADULT - SUBJECTIVE AND OBJECTIVE BOX
Podiatry HPI: Patient is seen in ICU bed, resting. Responds to questions. He reports he is feeling well, does not have pain in his bilateral extremities. Patient has CAIR boots on and states he has them on 24/7. Denies b/l calf pain. Denies any overnight acute events. Denies constitutional symptoms.     HPI:  62 year old morbid obese male, from [facility], has past medical hx of ambulatory dysfunction, ACD, bipolar disorder, BPH, CKD, HLD, PVD,  COPD not on oxygen, hypothyroidism, RBBB, lymphedema, CHF (last echo 2/22 normal EF, GIDD) was BIBEMS for hypotension 73/37 mmHg and bradycardia (40's). Story obtained from ED provider note as patient is not able to provide any history. Patient received 500 cc NS and one dose of atropine for bradycardia. Patient very somnolent during interview but arousable to verbal stimuli.  (30 Nov 2022 18:39)    Patient admits to  (-) Fevers, (-) Chills, (-) Nausea, (-) Vomiting, (-) Shortness of Breath (-) calf pain (-) chest pain     Medications acetaminophen     Tablet .. 975 milliGRAM(s) Oral every 6 hours PRN  buPROPion . 75 milliGRAM(s) Oral every 12 hours  chlorhexidine 2% Cloths 1 Application(s) Topical <User Schedule>  collagenase Ointment 1 Application(s) Topical two times a day  doxazosin 1 milliGRAM(s) Oral at bedtime  heparin   Injectable 5000 Unit(s) SubCutaneous every 8 hours  levothyroxine 125 MICROGram(s) Oral daily  pantoprazole   Suspension 40 milliGRAM(s) Oral daily  risperiDONE   Tablet 2 milliGRAM(s) Oral at bedtime  sevelamer carbonate Powder 800 milliGRAM(s) Oral three times a day with meals  simethicone 80 milliGRAM(s) Chew two times a day  valproic  acid Syrup 500 milliGRAM(s) Oral three times a day  zinc oxide 20% Ointment 1 Application(s) Topical two times a day    FHNo pertinent family history in first degree relatives    ,   PMHNo pertinent past medical history    Hypothyroid    Hyperlipidemia    Schizophrenia    Schizoaffective disorder    Ambulatory dysfunction    Anemia    History of BPH    CKD (chronic kidney disease)    Chronic obstructive pulmonary disease (COPD)    Bipolar disorder    Bipolar disorder    Mood disorder    HLD (hyperlipidemia)    BPH (benign prostatic hyperplasia)    Anemia    Chronic diastolic congestive heart failure    Lymphedema    Chronic leg pain       PSHNo significant past surgical history    No significant past surgical history        Labs                          7.5    7.27  )-----------( 257      ( 15 Dec 2022 04:30 )             26.0      12-15    146<H>  |  111<H>  |  52<H>  ----------------------------<  97  3.7   |  27  |  3.79<H>    Ca    10.1      15 Dec 2022 04:30  Phos  4.1     12-15  Mg     1.9     12-15    TPro  6.3  /  Alb  1.7<L>  /  TBili  0.2  /  DBili  x   /  AST  8<L>  /  ALT  10  /  AlkPhos  78  12-15     Vital Signs Last 24 Hrs  T(C): 36.6 (15 Dec 2022 04:49), Max: 36.8 (14 Dec 2022 23:38)  T(F): 97.9 (15 Dec 2022 04:49), Max: 98.3 (14 Dec 2022 23:38)  HR: 77 (15 Dec 2022 12:00) (72 - 83)  BP: 103/50 (15 Dec 2022 12:00) (84/46 - 138/71)  BP(mean): 66 (15 Dec 2022 12:00) (55 - 92)  RR: 25 (15 Dec 2022 12:00) (19 - 25)  SpO2: 98% (15 Dec 2022 12:00) (88% - 99%)    Parameters below as of 14 Dec 2022 21:00  Patient On (Oxygen Delivery Method): nasal cannula  O2 Flow (L/min): 2            WBC Count: 7.27 K/uL (12-15-22 @ 04:30)        ROS unremarkable outside of HPI            WBC Count: 15.84 K/uL *H* (12-09-22 @ 03:55)      PHYSICAL EXAM  Vasc:  DP and PT pulses non palpable due to b/l edema. TG: warm to warm. Pedal hair absent. Bilateral non pitting edema to LE.   Derm: pressure changes noted at right heel. Improved appearance of bilateral extremities on exam. B/L venous stasis changes to skin with hemosiderin deposits. Improved appearance of skin at bilateral extremities. Edema improved. No purulence or drainage noted at b/l legs on exam. No infected wounds on inspection of b/l lower extremity; no rash noted at b/l legs. Mild IDM noted in all interspaces. Hypertrophic and elongated toenails x 10  Neuro: protective sensation intact  MSK: patient is bed bound        A:  Chronic bilateral venous stasis with hemosiderin deposits in skin    P:   Patient evaluated and Chart reviewed  Inspected b/l extremities for signs of infection, no weeping or open wounds  Continue with IV antibiotics As Per ID  Continue wearing CAIR boots to avoid pressure ulcers as tolerated by patient  Recommend decub precautions  Podiatry to follow while in house  Outpatient nail care as needed  Seen bedside and discussed with Dr. Ramírez  Discussed with Attending Dr. Navas

## 2022-12-15 NOTE — PROGRESS NOTE ADULT - PROVIDER SPECIALTY LIST ADULT
Critical Care
Critical Care
Infectious Disease
Internal Medicine
Podiatry
Podiatry
Critical Care
Infectious Disease
Nephrology
Neurology
Neurology
Podiatry
Critical Care
Infectious Disease
Internal Medicine
Nephrology
Neurology
Podiatry
Podiatry
Critical Care
Nephrology
Critical Care

## 2022-12-15 NOTE — DISCHARGE NOTE NURSING/CASE MANAGEMENT/SOCIAL WORK - NSDCPEFALRISK_GEN_ALL_CORE
For information on Fall & Injury Prevention, visit: [email or web address] OR  [email or web address] OR  https://www.cdc.gov/steadi/patient.html

## 2022-12-15 NOTE — PROGRESS NOTE ADULT - SUBJECTIVE AND OBJECTIVE BOX
INTERVAL HPI/OVERNIGHT EVENTS:     PRESSORS: [ ] YES [ ] NO  WHICH:    ANTIBIOTICS:                  DATE STARTED:  ANTIBIOTICS:                  DATE STARTED:  ANTIBIOTICS:                  DATE STARTED:    Antimicrobial:    Cardiovascular:  doxazosin 1 milliGRAM(s) Oral at bedtime    Pulmonary:    Hematalogic:  heparin   Injectable 5000 Unit(s) SubCutaneous every 8 hours    Other:  acetaminophen     Tablet .. 975 milliGRAM(s) Oral every 6 hours PRN  buPROPion . 75 milliGRAM(s) Oral every 12 hours  chlorhexidine 2% Cloths 1 Application(s) Topical <User Schedule>  collagenase Ointment 1 Application(s) Topical two times a day  levothyroxine 125 MICROGram(s) Oral daily  pantoprazole   Suspension 40 milliGRAM(s) Oral daily  risperiDONE   Tablet 2 milliGRAM(s) Oral at bedtime  sevelamer carbonate Powder 800 milliGRAM(s) Oral three times a day with meals  simethicone 80 milliGRAM(s) Chew two times a day  valproic  acid Syrup 500 milliGRAM(s) Oral three times a day  zinc oxide 20% Ointment 1 Application(s) Topical two times a day      Drug Dosing Weight  Height (cm): 177.8 (30 Nov 2022 18:45)  Weight (kg): 113.9 (30 Nov 2022 18:45)  BMI (kg/m2): 36 (30 Nov 2022 18:45)  BSA (m2): 2.3 (30 Nov 2022 18:45)    CENTRAL LINE: [ ] YES [ ] NO  LOCATION:   DATE INSERTED:  REMOVE: [ ] YES [ ] NO  EXPLAIN:    HATFIELD: [ ] YES [ ] NO    DATE INSERTED:  REMOVE:  [ ] YES [ ] NO  EXPLAIN:    A-LINE:  [ ] YES [ ] NO  LOCATION:   DATE INSERTED:  REMOVE:  [ ] YES [ ] NO  EXPLAIN:    PMH/Social Hx/Fam Hx -reviewed admission note, no change since admission  PAST MEDICAL & SURGICAL HISTORY:  Hypothyroid      Hyperlipidemia      Ambulatory dysfunction      Anemia      History of BPH      CKD (chronic kidney disease)      Chronic obstructive pulmonary disease (COPD)      Bipolar disorder      HLD (hyperlipidemia)      BPH (benign prostatic hyperplasia)      Chronic diastolic congestive heart failure      Lymphedema      Chronic leg pain      No significant past surgical history        Heart faliure: acute [ ] chronic [ ] acute or chronic [ ] diastolic [ ] systolic [ ] combied systolic and diastolic[ ]  SHEYLA: ATN[ ] renal medullary necrosis [ ] CKD I [ ]CKDII [ ]CKD III [ ]CKD IV [ ]CKD V [ ]Other pathological lesions [ ]  Abdominal Nutrition Status: malnutrition [ ] cachexia [ ] morbid obesity/BMI=40 [ ] Supplement ordered [___________]     T(C): 36.6 (12-15-22 @ 04:49), Max: 36.8 (12-14-22 @ 23:38)  HR: 76 (12-15-22 @ 04:00)  BP: 99/52 (12-15-22 @ 04:00)  BP(mean): 68 (12-15-22 @ 04:00)  ABP: --  ABP(mean): --  RR: 22 (12-15-22 @ 04:00)  SpO2: 95% (12-15-22 @ 04:00)  Wt(kg): --          12-14 @ 07:01  -  12-15 @ 07:00  --------------------------------------------------------  IN: 1050 mL / OUT: 1925 mL / NET: -875 mL            PHYSICAL EXAM:    GENERAL: [ ]NAD, [ ]well-groomed, [ ]well-developed  HEAD:  [ ]Atraumatic, [ ]Normocephalic  EYES: [ ]EOMI, [ ]PERRLA, [ ]conjunctiva and sclera clear  ENMT: [ ]No tonsillar erythema, exudates, or enlargement; [ ]Moist mucous membranes, [ ]Good dentition, [ ]No lesions  NECK: [ ]Supple, normal appearance, [ ]No JVD; [ ]Normal thyroid; [ ]Trachea midline  NERVOUS SYSTEM:  [ ]Alert & Oriented X3, [ ]Good concentration; [ ]Motor Strength 5/5 B/L upper and lower extremities; [ ]DTRs 2+ intact and symmetric  CHEST/LUNG: [ ]No chest deformity; [ ]Normal percussion bilaterally; [ ]No rales, rhonchi, wheezing; [ ]Crackles at bases  HEART: [ ]Regular rate and rhythm; [ ]No murmurs, rubs, or gallops  ABDOMEN: [ ]Soft, Nontender, Nondistended; [ ]Bowel sounds present  EXTREMITIES:  [ ]2+ Peripheral Pulses, [ ]No clubbing, cyanosis, or edema [ ]Bilat lower extremity edema  LYMPH: [ ]No lymphadenopathy noted  SKIN: [ ]No rashes or lesions; [ ]Good capillary refill      LABS:  CBC Full  -  ( 15 Dec 2022 04:30 )  WBC Count : 7.27 K/uL  RBC Count : 2.53 M/uL  Hemoglobin : 7.5 g/dL  Hematocrit : 26.0 %  Platelet Count - Automated : 257 K/uL  Mean Cell Volume : 102.8 fl  Mean Cell Hemoglobin : 29.6 pg  Mean Cell Hemoglobin Concentration : 28.8 gm/dL  Auto Neutrophil # : x  Auto Lymphocyte # : x  Auto Monocyte # : x  Auto Eosinophil # : x  Auto Basophil # : x  Auto Neutrophil % : x  Auto Lymphocyte % : x  Auto Monocyte % : x  Auto Eosinophil % : x  Auto Basophil % : x    12-15    146<H>  |  111<H>  |  52<H>  ----------------------------<  97  3.7   |  27  |  3.79<H>    Ca    10.1      15 Dec 2022 04:30  Phos  4.1     12-15  Mg     1.9     12-15    TPro  6.3  /  Alb  1.7<L>  /  TBili  0.2  /  DBili  x   /  AST  8<L>  /  ALT  10  /  AlkPhos  78  12-15            RADIOLOGY & ADDITIONAL STUDIES REVIEWED:      [ ]GOALS OF CARE DISCUSSION WITH PATIENT/FAMILY/PROXY:    CRITICAL CARE TIME SPENT: 35 minutes INTERVAL HPI/OVERNIGHT EVENTS: Urology recommended ambulation/outpatient follow up regarding urinary retention    Antimicrobial:    Cardiovascular:  doxazosin 1 milliGRAM(s) Oral at bedtime    Pulmonary:    Hematalogic:  heparin   Injectable 5000 Unit(s) SubCutaneous every 8 hours    Other:  acetaminophen     Tablet .. 975 milliGRAM(s) Oral every 6 hours PRN  buPROPion . 75 milliGRAM(s) Oral every 12 hours  chlorhexidine 2% Cloths 1 Application(s) Topical <User Schedule>  collagenase Ointment 1 Application(s) Topical two times a day  levothyroxine 125 MICROGram(s) Oral daily  pantoprazole   Suspension 40 milliGRAM(s) Oral daily  risperiDONE   Tablet 2 milliGRAM(s) Oral at bedtime  sevelamer carbonate Powder 800 milliGRAM(s) Oral three times a day with meals  simethicone 80 milliGRAM(s) Chew two times a day  valproic  acid Syrup 500 milliGRAM(s) Oral three times a day  zinc oxide 20% Ointment 1 Application(s) Topical two times a day      Drug Dosing Weight  Height (cm): 177.8 (30 Nov 2022 18:45)  Weight (kg): 113.9 (30 Nov 2022 18:45)  BMI (kg/m2): 36 (30 Nov 2022 18:45)  BSA (m2): 2.3 (30 Nov 2022 18:45)    CENTRAL LINE: [ ] YES [ ] NO  LOCATION:   DATE INSERTED:  REMOVE: [ ] YES [ ] NO  EXPLAIN:    HATFIELD: [ ] YES [ ] NO    DATE INSERTED:  REMOVE:  [ ] YES [ ] NO  EXPLAIN:    A-LINE:  [ ] YES [ ] NO  LOCATION:   DATE INSERTED:  REMOVE:  [ ] YES [ ] NO  EXPLAIN:    PMH/Social Hx/Fam Hx -reviewed admission note, no change since admission  PAST MEDICAL & SURGICAL HISTORY:  Hypothyroid      Hyperlipidemia      Ambulatory dysfunction      Anemia      History of BPH      CKD (chronic kidney disease)      Chronic obstructive pulmonary disease (COPD)      Bipolar disorder      HLD (hyperlipidemia)      BPH (benign prostatic hyperplasia)      Chronic diastolic congestive heart failure      Lymphedema      Chronic leg pain      No significant past surgical history        Heart faliure: acute [ ] chronic [ ] acute or chronic [ ] diastolic [ ] systolic [ ] combied systolic and diastolic[ ]  SHEYLA: ATN[ ] renal medullary necrosis [ ] CKD I [ ]CKDII [ ]CKD III [ ]CKD IV [ ]CKD V [ ]Other pathological lesions [ ]  Abdominal Nutrition Status: malnutrition [ ] cachexia [ ] morbid obesity/BMI=40 [ ] Supplement ordered [___________]     T(C): 36.6 (12-15-22 @ 04:49), Max: 36.8 (12-14-22 @ 23:38)  HR: 76 (12-15-22 @ 04:00)  BP: 99/52 (12-15-22 @ 04:00)  BP(mean): 68 (12-15-22 @ 04:00)  ABP: --  ABP(mean): --  RR: 22 (12-15-22 @ 04:00)  SpO2: 95% (12-15-22 @ 04:00)  Wt(kg): --          12-14 @ 07:01  -  12-15 @ 07:00  --------------------------------------------------------  IN: 1050 mL / OUT: 1925 mL / NET: -875 mL            PHYSICAL EXAM:    GENERAL: [ ]NAD, [ ]well-groomed, [ ]well-developed  HEAD:  [ ]Atraumatic, [ ]Normocephalic  EYES: [ ]EOMI, [ ]PERRLA, [ ]conjunctiva and sclera clear  ENMT: [ ]No tonsillar erythema, exudates, or enlargement; [ ]Moist mucous membranes, [ ]Good dentition, [ ]No lesions  NECK: [ ]Supple, normal appearance, [ ]No JVD; [ ]Normal thyroid; [ ]Trachea midline  NERVOUS SYSTEM:  [ ]Alert & Oriented X3, [ ]Good concentration; [ ]Motor Strength 5/5 B/L upper and lower extremities; [ ]DTRs 2+ intact and symmetric  CHEST/LUNG: [ ]No chest deformity; [ ]Normal percussion bilaterally; [ ]No rales, rhonchi, wheezing; [ ]Crackles at bases  HEART: [ ]Regular rate and rhythm; [ ]No murmurs, rubs, or gallops  ABDOMEN: [ ]Soft, Nontender, Nondistended; [ ]Bowel sounds present  EXTREMITIES:  [ ]2+ Peripheral Pulses, [ ]No clubbing, cyanosis, or edema [ ]Bilat lower extremity edema  LYMPH: [ ]No lymphadenopathy noted  SKIN: [ ]No rashes or lesions; [ ]Good capillary refill      LABS:  CBC Full  -  ( 15 Dec 2022 04:30 )  WBC Count : 7.27 K/uL  RBC Count : 2.53 M/uL  Hemoglobin : 7.5 g/dL  Hematocrit : 26.0 %  Platelet Count - Automated : 257 K/uL  Mean Cell Volume : 102.8 fl  Mean Cell Hemoglobin : 29.6 pg  Mean Cell Hemoglobin Concentration : 28.8 gm/dL  Auto Neutrophil # : x  Auto Lymphocyte # : x  Auto Monocyte # : x  Auto Eosinophil # : x  Auto Basophil # : x  Auto Neutrophil % : x  Auto Lymphocyte % : x  Auto Monocyte % : x  Auto Eosinophil % : x  Auto Basophil % : x    12-15    146<H>  |  111<H>  |  52<H>  ----------------------------<  97  3.7   |  27  |  3.79<H>    Ca    10.1      15 Dec 2022 04:30  Phos  4.1     12-15  Mg     1.9     12-15    TPro  6.3  /  Alb  1.7<L>  /  TBili  0.2  /  DBili  x   /  AST  8<L>  /  ALT  10  /  AlkPhos  78  12-15            RADIOLOGY & ADDITIONAL STUDIES REVIEWED:      [ ]GOALS OF CARE DISCUSSION WITH PATIENT/FAMILY/PROXY:    CRITICAL CARE TIME SPENT: 35 minutes INTERVAL HPI/OVERNIGHT EVENTS: Urology recommended ambulation/outpatient follow up regarding urinary retention    Antimicrobial:    Cardiovascular:  doxazosin 1 milliGRAM(s) Oral at bedtime    Pulmonary:    Hematalogic:  heparin   Injectable 5000 Unit(s) SubCutaneous every 8 hours    Other:  acetaminophen     Tablet .. 975 milliGRAM(s) Oral every 6 hours PRN  buPROPion . 75 milliGRAM(s) Oral every 12 hours  chlorhexidine 2% Cloths 1 Application(s) Topical <User Schedule>  collagenase Ointment 1 Application(s) Topical two times a day  levothyroxine 125 MICROGram(s) Oral daily  pantoprazole   Suspension 40 milliGRAM(s) Oral daily  risperiDONE   Tablet 2 milliGRAM(s) Oral at bedtime  sevelamer carbonate Powder 800 milliGRAM(s) Oral three times a day with meals  simethicone 80 milliGRAM(s) Chew two times a day  valproic  acid Syrup 500 milliGRAM(s) Oral three times a day  zinc oxide 20% Ointment 1 Application(s) Topical two times a day      Drug Dosing Weight  Height (cm): 177.8 (30 Nov 2022 18:45)  Weight (kg): 113.9 (30 Nov 2022 18:45)  BMI (kg/m2): 36 (30 Nov 2022 18:45)  BSA (m2): 2.3 (30 Nov 2022 18:45)    HATFIELD 12/14    PMH/Social Hx/Fam Hx -reviewed admission note, no change since admission  PAST MEDICAL & SURGICAL HISTORY:  Hypothyroid      Hyperlipidemia      Ambulatory dysfunction      Anemia      History of BPH      CKD (chronic kidney disease)      Chronic obstructive pulmonary disease (COPD)      Bipolar disorder      HLD (hyperlipidemia)      BPH (benign prostatic hyperplasia)      Chronic diastolic congestive heart failure      Lymphedema      Chronic leg pain      No significant past surgical history        Heart faliure: acute [ ] chronic [ ] acute or chronic [ ] diastolic [ ] systolic [ ] combied systolic and diastolic[ ]  SHEYLA: ATN[ ] renal medullary necrosis [ ] CKD I [ ]CKDII [ ]CKD III [ ]CKD IV [ ]CKD V [ ]Other pathological lesions [ ]  Abdominal Nutrition Status: malnutrition [ ] cachexia [ ] morbid obesity/BMI=40 [ ] Supplement ordered [___________]     T(C): 36.6 (12-15-22 @ 04:49), Max: 36.8 (12-14-22 @ 23:38)  HR: 76 (12-15-22 @ 04:00)  BP: 99/52 (12-15-22 @ 04:00)  BP(mean): 68 (12-15-22 @ 04:00)  ABP: --  ABP(mean): --  RR: 22 (12-15-22 @ 04:00)  SpO2: 95% (12-15-22 @ 04:00)  Wt(kg): --          12-14 @ 07:01  -  12-15 @ 07:00  --------------------------------------------------------  IN: 1050 mL / OUT: 1925 mL / NET: -875 mL            PHYSICAL EXAM:    GENERAL: NAD  HEAD:  Atraumatic, Normocephalic  EYES: EOMI, PERRLA, conjunctiva and sclera clear  ENMT: No tonsillar erythema, exudates, or enlargement; Moist mucous membranes, Good dentition, No lesions  NECK: Supple, normal appearance, No JVD; Normal thyroid; Trachea midline  NERVOUS SYSTEM:  Alert & Oriented X3,  Motor Strength 5/5 B/L upper and lower extremities, sensation intact  CHEST/LUNG: Lungs clear to auscultation bilaterally, No rales, rhonchi, wheezing   HEART: Regular rate and rhythm; No murmurs, rubs, or gallops  ABDOMEN: Soft, Nontender, Nondistended; Bowel sounds present  EXTREMITIES:  2+ Peripheral Pulses, No clubbing, cyanosis, or edema  LYMPH: No lymphadenopathy noted  SKIN: No rashes or lesions;  Good capillary refill        LABS:  CBC Full  -  ( 15 Dec 2022 04:30 )  WBC Count : 7.27 K/uL  RBC Count : 2.53 M/uL  Hemoglobin : 7.5 g/dL  Hematocrit : 26.0 %  Platelet Count - Automated : 257 K/uL  Mean Cell Volume : 102.8 fl  Mean Cell Hemoglobin : 29.6 pg  Mean Cell Hemoglobin Concentration : 28.8 gm/dL  Auto Neutrophil # : x  Auto Lymphocyte # : x  Auto Monocyte # : x  Auto Eosinophil # : x  Auto Basophil # : x  Auto Neutrophil % : x  Auto Lymphocyte % : x  Auto Monocyte % : x  Auto Eosinophil % : x  Auto Basophil % : x    12-15    146<H>  |  111<H>  |  52<H>  ----------------------------<  97  3.7   |  27  |  3.79<H>    Ca    10.1      15 Dec 2022 04:30  Phos  4.1     12-15  Mg     1.9     12-15    TPro  6.3  /  Alb  1.7<L>  /  TBili  0.2  /  DBili  x   /  AST  8<L>  /  ALT  10  /  AlkPhos  78  12-15            RADIOLOGY & ADDITIONAL STUDIES REVIEWED:      [ ]GOALS OF CARE DISCUSSION WITH PATIENT/FAMILY/PROXY:    CRITICAL CARE TIME SPENT: 35 minutes INTERVAL HPI/OVERNIGHT EVENTS: Urology recommended ambulation/outpatient follow up regarding urinary retention    Antimicrobial:    Cardiovascular:  doxazosin 1 milliGRAM(s) Oral at bedtime    Pulmonary:    Hematalogic:  heparin   Injectable 5000 Unit(s) SubCutaneous every 8 hours    Other:  acetaminophen     Tablet .. 975 milliGRAM(s) Oral every 6 hours PRN  buPROPion . 75 milliGRAM(s) Oral every 12 hours  chlorhexidine 2% Cloths 1 Application(s) Topical <User Schedule>  collagenase Ointment 1 Application(s) Topical two times a day  levothyroxine 125 MICROGram(s) Oral daily  pantoprazole   Suspension 40 milliGRAM(s) Oral daily  risperiDONE   Tablet 2 milliGRAM(s) Oral at bedtime  sevelamer carbonate Powder 800 milliGRAM(s) Oral three times a day with meals  simethicone 80 milliGRAM(s) Chew two times a day  valproic  acid Syrup 500 milliGRAM(s) Oral three times a day  zinc oxide 20% Ointment 1 Application(s) Topical two times a day      Drug Dosing Weight  Height (cm): 177.8 (30 Nov 2022 18:45)  Weight (kg): 113.9 (30 Nov 2022 18:45)  BMI (kg/m2): 36 (30 Nov 2022 18:45)  BSA (m2): 2.3 (30 Nov 2022 18:45)    HATFIELD 12/14    PMH/Social Hx/Fam Hx -reviewed admission note, no change since admission  PAST MEDICAL & SURGICAL HISTORY:  Hypothyroid      Hyperlipidemia      Ambulatory dysfunction      Anemia      History of BPH      CKD (chronic kidney disease)      Chronic obstructive pulmonary disease (COPD)      Bipolar disorder      HLD (hyperlipidemia)      BPH (benign prostatic hyperplasia)      Chronic diastolic congestive heart failure      Lymphedema      Chronic leg pain      No significant past surgical history        Heart faliure: acute [ ] chronic [ ] acute or chronic [ ] diastolic [ ] systolic [ ] combied systolic and diastolic[ ]  SHEYLA: ATN[ ] renal medullary necrosis [ ] CKD I [ ]CKDII [ ]CKD III [ ]CKD IV [ ]CKD V [ ]Other pathological lesions [ ]  Abdominal Nutrition Status: malnutrition [ ] cachexia [ ] morbid obesity/BMI=40 [ ] Supplement ordered [___________]     T(C): 36.6 (12-15-22 @ 04:49), Max: 36.8 (12-14-22 @ 23:38)  HR: 76 (12-15-22 @ 04:00)  BP: 99/52 (12-15-22 @ 04:00)  BP(mean): 68 (12-15-22 @ 04:00)  ABP: --  ABP(mean): --  RR: 22 (12-15-22 @ 04:00)  SpO2: 95% (12-15-22 @ 04:00)  Wt(kg): --          12-14 @ 07:01  -  12-15 @ 07:00  --------------------------------------------------------  IN: 1050 mL / OUT: 1925 mL / NET: -875 mL            PHYSICAL EXAM:    GENERAL: NAD  HEAD:  Atraumatic, Normocephalic  EYES: EOMI, PERRLA, conjunctiva and sclera clear  NECK: Supple, normal appearance  NERVOUS SYSTEM:  Alert & Oriented X3  CHEST/LUNG: Lungs clear to auscultation bilaterally, No rales, rhonchi, wheezing   HEART: Regular rate and rhythm; No murmurs, rubs, or gallops  ABDOMEN: Soft, Nontender, Distended; Bowel sounds present  EXTREMITIES:  Bilateral lower extremity edema, no worsening erythema        LABS:  CBC Full  -  ( 15 Dec 2022 04:30 )  WBC Count : 7.27 K/uL  RBC Count : 2.53 M/uL  Hemoglobin : 7.5 g/dL  Hematocrit : 26.0 %  Platelet Count - Automated : 257 K/uL  Mean Cell Volume : 102.8 fl  Mean Cell Hemoglobin : 29.6 pg  Mean Cell Hemoglobin Concentration : 28.8 gm/dL  Auto Neutrophil # : x  Auto Lymphocyte # : x  Auto Monocyte # : x  Auto Eosinophil # : x  Auto Basophil # : x  Auto Neutrophil % : x  Auto Lymphocyte % : x  Auto Monocyte % : x  Auto Eosinophil % : x  Auto Basophil % : x    12-15    146<H>  |  111<H>  |  52<H>  ----------------------------<  97  3.7   |  27  |  3.79<H>    Ca    10.1      15 Dec 2022 04:30  Phos  4.1     12-15  Mg     1.9     12-15    TPro  6.3  /  Alb  1.7<L>  /  TBili  0.2  /  DBili  x   /  AST  8<L>  /  ALT  10  /  AlkPhos  78  12-15            RADIOLOGY & ADDITIONAL STUDIES REVIEWED:      [ ]GOALS OF CARE DISCUSSION WITH PATIENT/FAMILY/PROXY:    CRITICAL CARE TIME SPENT: 35 minutes

## 2022-12-15 NOTE — PROGRESS NOTE ADULT - SUBJECTIVE AND OBJECTIVE BOX
Patient is a 62y old  Male who presents with a chief complaint of Shock, Acute Encephalopathy (15 Dec 2022 11:42)    PATIENT IS SEEN AND EXAMINED IN ICU.    ALLERGIES:  No Known Allergies    VITALS:    Vital Signs Last 24 Hrs  T(C): 36.6 (15 Dec 2022 04:49), Max: 36.8 (14 Dec 2022 23:38)  T(F): 97.9 (15 Dec 2022 04:49), Max: 98.3 (14 Dec 2022 23:38)  HR: 77 (15 Dec 2022 12:00) (72 - 83)  BP: 103/50 (15 Dec 2022 12:00) (84/46 - 138/71)  BP(mean): 66 (15 Dec 2022 12:00) (55 - 97)  RR: 25 (15 Dec 2022 12:00) (18 - 25)  SpO2: 98% (15 Dec 2022 12:00) (88% - 99%)    Parameters below as of 14 Dec 2022 21:00  Patient On (Oxygen Delivery Method): nasal cannula  O2 Flow (L/min): 2      LABS:    CBC Full  -  ( 15 Dec 2022 04:30 )  WBC Count : 7.27 K/uL  RBC Count : 2.53 M/uL  Hemoglobin : 7.5 g/dL  Hematocrit : 26.0 %  Platelet Count - Automated : 257 K/uL  Mean Cell Volume : 102.8 fl  Mean Cell Hemoglobin : 29.6 pg  Mean Cell Hemoglobin Concentration : 28.8 gm/dL  Auto Neutrophil # : x  Auto Lymphocyte # : x  Auto Monocyte # : x  Auto Eosinophil # : x  Auto Basophil # : x  Auto Neutrophil % : x  Auto Lymphocyte % : x  Auto Monocyte % : x  Auto Eosinophil % : x  Auto Basophil % : x      12-15    146<H>  |  111<H>  |  52<H>  ----------------------------<  97  3.7   |  27  |  3.79<H>    Ca    10.1      15 Dec 2022 04:30  Phos  4.1     12-15  Mg     1.9     12-15    TPro  6.3  /  Alb  1.7<L>  /  TBili  0.2  /  DBili  x   /  AST  8<L>  /  ALT  10  /  AlkPhos  78  12-15    CAPILLARY BLOOD GLUCOSE            LIVER FUNCTIONS - ( 15 Dec 2022 04:30 )  Alb: 1.7 g/dL / Pro: 6.3 g/dL / ALK PHOS: 78 U/L / ALT: 10 U/L DA / AST: 8 U/L / GGT: x           Creatinine Trend: 3.79<--, 3.62<--, 3.65<--, 3.43<--, 3.46<--, 3.50<--  I&O's Summary    14 Dec 2022 07:01  -  15 Dec 2022 07:00  --------------------------------------------------------  IN: 1050 mL / OUT: 1925 mL / NET: -875 mL            ET Tube ET Tube  12-01 @ 17:40   Normal Respiratory Ivelisse present  --    Rare polymorphonuclear leukocytes per low power field  Rare Squamous epithelial cells per low power field  No organisms seen per oil power field      .Blood Blood  12-01 @ 00:15   No Growth Final  --  --      Clean Catch Clean Catch (Midstream)  12-01 @ 00:14   No growth  --  --          MEDICATIONS:    MEDICATIONS  (STANDING):  buPROPion . 75 milliGRAM(s) Oral every 12 hours  chlorhexidine 2% Cloths 1 Application(s) Topical <User Schedule>  collagenase Ointment 1 Application(s) Topical two times a day  doxazosin 1 milliGRAM(s) Oral at bedtime  heparin   Injectable 5000 Unit(s) SubCutaneous every 8 hours  levothyroxine 125 MICROGram(s) Oral daily  pantoprazole   Suspension 40 milliGRAM(s) Oral daily  risperiDONE   Tablet 2 milliGRAM(s) Oral at bedtime  sevelamer carbonate Powder 800 milliGRAM(s) Oral three times a day with meals  simethicone 80 milliGRAM(s) Chew two times a day  valproic  acid Syrup 500 milliGRAM(s) Oral three times a day  zinc oxide 20% Ointment 1 Application(s) Topical two times a day      MEDICATIONS  (PRN):  acetaminophen     Tablet .. 975 milliGRAM(s) Oral every 6 hours PRN Temp greater or equal to 38C (100.4F), Moderate Pain (4 - 6), Severe Pain (7 - 10)      REVIEW OF SYSTEMS:                           ALL ROS DONE [ X   ]    CONSTITUTIONAL:  LETHARGIC [   ], FEVER [   ], UNRESPONSIVE [   ]  CVS:  CP  [   ], SOB, [   ], PALPITATIONS [   ], DIZZYNESS [   ]  RS: COUGH [   ], SPUTUM [   ]  GI: ABDOMINAL PAIN [   ], NAUSEA [   ], VOMITINGS [   ], DIARRHEA [   ], CONSTIPATION [   ]  :  DYSURIA [   ], NOCTURIA [   ], INCREASED FREQUENCY [   ], DRIBLING [   ],  SKELETAL: PAINFUL JOINTS [   ], SWOLLEN JOINTS [   ], NECK ACHE [   ], LOW BACK ACHE [   ],  SKIN : ULCERS [   ], RASH [   ], ITCHING [   ]  CNS: HEAD ACHE [   ], DOUBLE VISION [   ], BLURRED VISION [   ], AMS / CONFUSION [   ], SEIZURES [   ], WEAKNESS [   ],TINGLING / NUMBNESS [   ]    PHYSICAL EXAMINATION:  GENERAL APPEARANCE: NO DISTRESS  HEENT:  NO PALLOR, NO  JVD,  NO   NODES, NECK SUPPLE  CVS: S1 +, S2 +,   RS: AEEB,  OCCASIONAL  RALES +,   NO RONCHI  ABD: SOFT, NT, NO, BS +  EXT: PE +  SKIN: WARM,   SKELETAL:  ROM ACCEPTABLE  CNS:  AAO X 2    RADIOLOGY :    REVIEWED    ASSESSMENT :       PLAN:  HPI:  62 year old morbid obese male, from [facility], has past medical hx of ambulatory dysfunction, ACD, bipolar disorder, BPH, CKD, HLD, PVD,  COPD not on oxygen, hypothyroidism, RBBB, lymphedema, CHF (last echo 2/22 normal EF, GIDD) was BIBEMS for hypotension 73/37 mmHg and bradycardia (40's). Story obtained from ED provider note as patient is not able to provide any history. Patient received 500 cc NS and one dose of atropine for bradycardia. Patient very somnolent during interview but arousable to verbal stimuli.  (30 Nov 2022 18:39)    # D/C PLAN TO [facility] PER CRITICAL CARE TEAM      # SEPTIC SHOCK - S/T CELLULITIS OF LOWER EXTREMITY  - S/P IVF, S/P VASOPRESSORS  - S/P ROCEPHIN  - ID CONSULT  - CRITICAL CARE CONSULT    # ACUTE ON CHRONIC HYPOXIC RESPIRATORY FAILURE S/T PULMONARY EDEMA - IMPROVED  # UNDERLYING COPD  - ON PRN O2  - S/P HIGH FLOW NASAL CANNULA  - S/P CHEST PT  - CRITICAL CARE EVALUATION    # ACUTE METABOLIC ENCEPHALOPATHY S/T ? UREMIA  # HX OF SEIZURE DX, W/ QUESTIONABLE SPASMODIC MOVEMENT - RESOLVED  - S/P EEG - NO EPILEPTIFORM PATTERN  - ON VALPROIC ACID  - NEUROLOGY CONSULT  - CRITICAL CARE CONSULT    # SHEYLA ON CKD   # URINARY RETENTION, BPH  - HATFIELD PLACED  - STRICT IS AND OS  - AVOID NEPHROTOXIC AGENTS  - MONITOR CR  - PATIENT REQUIRED SHILEY PLACEMENT AND HD ON 12/2 AND 12/3  - NOW S/P EMERGENT HD  - NEPHROLOGY CONSULT  - UROLOGY CONSULT    # AMBULATORY DYSFUNCTION, IMPAIRED GAIT S/T OA, PVD, PERIPHERAL NEUROPATHY  - NOTED PT EVAL - TG    # SINUS BRADYCARDIA  - TTE - NORMAL LV EF    # HAGMA - RESOLVED, NEPHROLOGY CONSULT IN PROGRESS    # HYPERNATREMIA - SUSPECTED HYPERVOLEMIC HYPERNATREMIA - ENCOURAGED PO FREE WATER INTAKE - RESOLVED    # DYSPHAGIA - ON MODIFIED DIET PER SWALLOW EVAL    # ANEMIA OF CHRONIC DISEASE - MONITOR HGB, TRANSFUSION THRESHOLD HGB < 8    # HYPOTHYROIDISM - ON LEVOTHYROXINE    # PRESSURE ULCERS  - PATIENT IS HIGH RISK FOR PRESSURE ULCERS DESPITE PREVENTIVE MEASURES IN PLACE  - WOUND CARE CONSULT    # GI AND DVT PPX

## 2022-12-15 NOTE — PROGRESS NOTE ADULT - SUBJECTIVE AND OBJECTIVE BOX
NEPHROLOGY MEDICAL CARE, [facility] - Dr. Taj Valenzuela/ Dr. Hazel Solis/ Dr. Cosmo Root/ Dr. Fang Gifford    Patient was seen and examined at bedside.    CC: patient is okay and NAD    Vital Signs Last 24 Hrs  T(C): 36.6 (15 Dec 2022 04:49), Max: 36.8 (14 Dec 2022 23:38)  T(F): 97.9 (15 Dec 2022 04:49), Max: 98.3 (14 Dec 2022 23:38)  HR: 74 (15 Dec 2022 09:15) (72 - 87)  BP: 112/55 (15 Dec 2022 08:00) (84/46 - 138/71)  BP(mean): 74 (15 Dec 2022 08:00) (55 - 97)  RR: 25 (15 Dec 2022 09:15) (16 - 27)  SpO2: 96% (15 Dec 2022 09:15) (88% - 99%)    Parameters below as of 14 Dec 2022 21:00  Patient On (Oxygen Delivery Method): nasal cannula  O2 Flow (L/min): 2      12-14 @ 07:01  -  12-15 @ 07:00  --------------------------------------------------------  IN: 1050 mL / OUT: 1925 mL / NET: -875 mL        PHYSICAL EXAM:  General: NAD on NC  Eyes: conjunctiva and sclera clear  ENMT: Atraumatic, Normocephalic;  Respiratory: Bilateral poor air entry  Cardiovascular: S1S2+; no m/r/g  Gastrointestinal: Soft, Non-tender, Nondistended; Bowel sounds present,   : daley's cath with yellowish urine.  Neuro:  AAOx2-3  Ext:  vascular skin changes of both legs; No Cyanosis  Skin: No visible rashes    MEDICATIONS:  MEDICATIONS  (STANDING):  buPROPion . 75 milliGRAM(s) Oral every 12 hours  chlorhexidine 2% Cloths 1 Application(s) Topical <User Schedule>  collagenase Ointment 1 Application(s) Topical two times a day  doxazosin 1 milliGRAM(s) Oral at bedtime  heparin   Injectable 5000 Unit(s) SubCutaneous every 8 hours  levothyroxine 125 MICROGram(s) Oral daily  pantoprazole   Suspension 40 milliGRAM(s) Oral daily  risperiDONE   Tablet 2 milliGRAM(s) Oral at bedtime  sevelamer carbonate Powder 800 milliGRAM(s) Oral three times a day with meals  simethicone 80 milliGRAM(s) Chew two times a day  valproic  acid Syrup 500 milliGRAM(s) Oral three times a day  zinc oxide 20% Ointment 1 Application(s) Topical two times a day    MEDICATIONS  (PRN):  acetaminophen     Tablet .. 975 milliGRAM(s) Oral every 6 hours PRN Temp greater or equal to 38C (100.4F), Moderate Pain (4 - 6), Severe Pain (7 - 10)          LABS:                        7.5    7.27  )-----------( 257      ( 15 Dec 2022 04:30 )             26.0     12-15    146<H>  |  111<H>  |  52<H>  ----------------------------<  97  3.7   |  27  |  3.79<H>    Ca    10.1      15 Dec 2022 04:30  Phos  4.1     12-15  Mg     1.9     12-15    TPro  6.3  /  Alb  1.7<L>  /  TBili  0.2  /  DBili  x   /  AST  8<L>  /  ALT  10  /  AlkPhos  78  12-15        Magnesium, Serum: 1.9 mg/dL (12-15 @ 04:30)  Phosphorus Level, Serum: 4.1 mg/dL (12-15 @ 04:30)    Urine studies    PTH and Vit D:

## 2022-12-15 NOTE — PROGRESS NOTE ADULT - REASON FOR ADMISSION
Shock, Acute Encephalopathy

## 2022-12-15 NOTE — PROGRESS NOTE ADULT - ASSESSMENT
62 year old morbid obese male, from [facility], has past medical hx of ambulatory dysfunction, ACD, bipolar disorder, BPH, CKD, HLD, PVD, COPD not on oxygen, hypothyroidism, RBBB, lymphedema, CHF (last echo 2/22 normal EF, GIDD) was BIBEMS for hypotension 73/37 mmHg and bradycardia (40's). Patient admitted to ICU for shock and acute encephalopathy.     DX:  - Shock septic vs hypovolemia  - LE cellulitis   - Acute encephalopathy   - Sinus bradycardia  - HAGMA   - SHEYLA on CKD  - Anemia   - COPD not on oxygen at home   - HLD   - BPH  - Hypothyroidism   - Mood disorder       =================== Neuro============================  #Acute Encephalopathy; likely in the setting of acidemia, infection, uremia  Awake and alert X3 back at baseline    #Seizure  - Home med includes valproic acid  - Patient is having repeated muscle spasms, improved on Versed but not fully stopped  - Still having muscle movements after dialysis which brought down BUN  - Started propofol instead of precedex  - Valproic acid level low, likely due to missed doses when patient was originally admitted.  - DC keppra per neuro recs, prefer valproate as less nephrotoxic  - spot EEG 12/3 - moderate generalized slowing  - 24 hr EEG (12/4)-no change, No epileptiform pattern or seizure seen.  - Valproic Acid to 750mg BID    ================= Cardiovascular==========================  Sinus bradycardia  - EKG sinus bradycardia, old RBBB  - TTE 12/1 - LVEF 60-65%  - trops neg  - Given normal EF, no changes in EKG, cardiogenic shock less likely    Hypotension  - off pressers      ================- Pulm=================================  COPD  - Patient with hx of COPD not in exacerbation   - Chest X ray Showed bilateral patchy infiltrates, no concern for fluid overload    - CXR 12/7 showed severe R Atelectasis, likely due to mucous plugging, chest PT, R side elevated  -CXR 12/7 3PM showed improvement with chest PT  -CXR 12/8 showed worsening edema, fluids stopped,   - Patient on high flow  - Oxygen supplementation as needed to maintain SpO2 of 88-92%  [ ] Trial Room Air      ==================ID===================================  Shock  - Concern for sepsis vs hypovolemic   - Empirical treatment with CTX for LE cellulitis and for pseudomonal coverage  - S/p 4L NS   - Started on pressors   - Off Pressors  F/u lactate 1.4, procalcitonin, Bcx, Ucx, CK NGTD  - ID consulted Dr Nolasco   - Completed CTX    ================= Nephro================================  #Urinary Retention  Failed TOV on 12/10 and 12/13, retaining more than 500cc on bladder scan  Urology consulted; recommending ambulation, but no inpatient workup at this time, and outpatient follow up    # SHEYLA on CKD  - On admission, patient with Cr 6.4>>5.6, baseline 3.7; stable at baseline today  - Worsening CKD most likely a combination of obstructive vs pre- renal   - Viera catheter placed in ED with 1000 cc of UO  - -58mL in the past 24 hours  - Avoid nephrotoxins, NSAIDS, ACEI and ARBS  - Monitor BMP daily  - Nephcolleen Valenzuela consulted  - S/p Dialysis 12/2 and 12/3, Creatnine near baseline, mental status improved  - Patient's renal function returned to baseline    HAGMA  - Most likely from uremia   - Nephcolleen Valenzuela consulted  - Resolved    Hypernatremia  Likely hypervolemic hypernatremia, given fluid overload, and patient's refractory Na secondary to fluid.  Encourage PO water intake      =================GI====================================  Pureed Diet  Resume psych PO medications- Wellbutrin  Swallow evaluation  BM today, regular    ================ Heme==================================  Anemia   - Anemia of chronic disease   - Monitor CBC daily   - No active bleed  - Hgb 8, s/p pRBC 12/13    =================Endocrine===============================  Hypothyroidism  - On Levothyroxine    ================= Skin/Catheters============================  Peripheral IV lines   Sacral and Posterior Thigh Ulcers, Wound Care Consulted  Viera to be in place at DC    =================Prophylaxis =============================  DVT prophylaxis with Heparin SQ daily   GI prophylaxis with PPI daily    ==================GOC==================================  FULL CODE    62 year old morbid obese male, from [facility], has past medical hx of ambulatory dysfunction, ACD, bipolar disorder, BPH, CKD, HLD, PVD, COPD not on oxygen, hypothyroidism, RBBB, lymphedema, CHF (last echo 2/22 normal EF, GIDD) was BIBEMS for hypotension 73/37 mmHg and bradycardia (40's). Patient admitted to ICU for shock and acute encephalopathy.     DX:  - Shock septic vs hypovolemia  - LE cellulitis   - Acute encephalopathy   - Sinus bradycardia  - HAGMA   - SHEYLA on CKD  - Anemia   - COPD not on oxygen at home   - HLD   - BPH  - Hypothyroidism   - Mood disorder       =================== Neuro============================  #Acute Encephalopathy; likely in the setting of acidemia, infection, uremia  Awake and alert X3 back at baseline    #Seizure  - Home med includes valproic acid  - Patient is having repeated muscle spasms, improved on Versed but not fully stopped  - Still having muscle movements after dialysis which brought down BUN  - Started propofol instead of precedex  - Valproic acid level low, likely due to missed doses when patient was originally admitted.  - DC keppra per neuro recs, prefer valproate as less nephrotoxic  - spot EEG 12/3 - moderate generalized slowing  - 24 hr EEG (12/4)-no change, No epileptiform pattern or seizure seen.  - Valproic Acid to 750mg BID    ================= Cardiovascular==========================  Sinus bradycardia  - EKG sinus bradycardia, old RBBB  - TTE 12/1 - LVEF 60-65%  - trops neg  - Given normal EF, no changes in EKG, cardiogenic shock less likely    Hypotension  - off pressers      ================- Pulm=================================  COPD  - Patient with hx of COPD not in exacerbation   - Chest X ray Showed bilateral patchy infiltrates, no concern for fluid overload    - CXR 12/7 showed severe R Atelectasis, likely due to mucous plugging, chest PT, R side elevated  -CXR 12/7 3PM showed improvement with chest PT  -CXR 12/8 showed worsening edema, fluids stopped,   - Patient on high flow  - Oxygen supplementation as needed to maintain SpO2 of 88-92%      ==================ID===================================  Shock  - Concern for sepsis vs hypovolemic   - Empirical treatment with CTX for LE cellulitis and for pseudomonal coverage  - S/p 4L NS   - Started on pressors   - Off Pressors  F/u lactate 1.4, procalcitonin, Bcx, Ucx, CK NGTD  - ID consulted Dr Nolasco   - Completed CTX    ================= Nephro================================  #Urinary Retention  Failed TOV on 12/10 and 12/13, retaining more than 500cc on bladder scan  Urology consulted; recommending ambulation, but no inpatient workup at this time, and outpatient follow up    # SHEYLA on CKD  - On admission, patient with Cr 6.4>>5.6, baseline 3.7; stable at baseline today  - Worsening CKD most likely a combination of obstructive vs pre- renal   - Viera catheter placed in ED with 1000 cc of UO  - -58mL in the past 24 hours  - Avoid nephrotoxins, NSAIDS, ACEI and ARBS  - Monitor BMP daily  - Nephro Dr Valenzuela consulted  - S/p Dialysis 12/2 and 12/3, Creatnine near baseline, mental status improved  - Patient's renal function returned to baseline    HAGMA  - Most likely from uremia   - Nephro Dr Valenzuela consulted  - Resolved    Hypernatremia  Likely hypervolemic hypernatremia, given fluid overload, and patient's refractory Na secondary to fluid.  Encourage PO water intake      =================GI====================================  Pureed Diet  Resume psych PO medications- Wellbutrin  Swallow evaluation  BM today, regular    ================ Heme==================================  Anemia   - Anemia of chronic disease   - Monitor CBC daily   - No active bleed  - Hgb 8, s/p pRBC 12/13    =================Endocrine===============================  Hypothyroidism  - On Levothyroxine    ================= Skin/Catheters============================  Peripheral IV lines   Sacral and Posterior Thigh Ulcers, Wound Care Consulted  Viera to be in place at DC    =================Prophylaxis =============================  DVT prophylaxis with Heparin SQ daily   GI prophylaxis with PPI daily    ==================GOC==================================  FULL CODE

## 2022-12-15 NOTE — PROGRESS NOTE ADULT - ASSESSMENT
1. SHEYLA due to ATN.  First HD 12/2. s/p shiley on 12/2. dialysis started since Scr not improved and remained at 5s.  -Scr stable at 3.7mg/dl and unchanged with renal recovery.  Last HD on saturday (12/3 and 12/2).  -Adjust meds to eGFR and avoid IV Gadolinium contrast,NSAIDs, and phosphate enema.  -Monitor I/O's daily.   -Monitor SMA daily.  2. CKD stage 4 most likely due to ischemic nephropathy  -baseline scr around 3.7mg/dL in july 2022.  -Keep patient euvolemic and renal diet  -Avoid Nephrotoxic Meds/ Agents such as (NSAIDs, IV contrast, Aminoglycosides such as gentamicin, -Gadolinium contrast, Phosphate containing enemas, etc..)  -Adjust Medications according to eGFR  3. Hypotension possible sepsis  -bp is stable and off pressor.  4. Mineral Bone Disease:  -phos is improving and PTH intact is 400.  -continue calcitriol 0.25mcg daily.  5. HAGMA and respiratory acidosis:  -abg noted.   -patient has respiratory acidosis and managed by ICU.   6. Bradycardia due to unclear etiology  -stable.  -TSH is nl  7. Pulm:  -s/p intubated on 11/30 for worsening respiratory support and extubated.  -plan as per MICU.   8. Anemia of ESRD:  -iron panel and ferritin is acceptable.   -F/u CBC daily  -transfuse if HB < 7.0.  9. Encephalopathy:  -video EEG shows no seizures.    10. Hypernatremia due to water deficit and insensible losses. Pt is clinically hypervolemic.   -Na improving and continue free water 200cc 6hrs. caution with IVFs since CXR shows congestion.   -Monitor I/O's. Check Serum Na Daily. Avoid high solute intake diet and sodium bicarbonate infuse. Avoid overcorrection of NA (8-10meq/day)  10. Hypokalemia:  -stable.  -monitor K.   11. awaiting to STR    Patient is critically ill. Time Spent: 35mins.  Discussed the assessment and plan with ICU Team/Nurse

## 2022-12-15 NOTE — PROGRESS NOTE ADULT - ATTENDING COMMENTS
62 year old morbid obese male, from [facility], has past medical hx of ambulatory dysfunction, ACD, bipolar disorder, BPH, CKD, HLD, PVD, COPD not on oxygen, hypothyroidism, RBBB, lymphedema, CHF (last echo 2/22 normal EF, GIDD). Presented for hypotension 73/37 mmHg and bradycardia (40's). Patient admitted to ICU for shock and acute encephalopathy. Now intubated on mechanical ventilation.     DX:  - Acute hypoxic resp failure   - Shock - septic vs hypovolemia  - Bilateral lower cellulitis   - Acute encephalopathy   - Sinus bradycardia  - HAGMA   - SHEYLA on CKD  - Anemia   - COPD not on oxygen at home   - HLD   - BPH  - Hypothyroidism   - Mood disorder       Plan:  - Comfortable on NC oxygent  - Completed course of antibx   - CT scan showing basilar pneumonia   - Cultures negative   - Monitor renal function  - HD as per Neph  - No obvious signs of bleeding  - Cont. home psych medications  - Cont. Valproic acid per neurology recs  - Cont. Levothyroxine  - Failed void trial, daley reinserted  - Urology consult, recommend outpatient follow up  - Cont. Doxazosin   - DVT and stress ulcer prophylaxis.   - Oral diet  - Discharge planning to TG

## 2023-01-01 ENCOUNTER — TRANSCRIPTION ENCOUNTER (OUTPATIENT)
Age: 63
End: 2023-01-01

## 2023-01-01 ENCOUNTER — INPATIENT (INPATIENT)
Facility: HOSPITAL | Age: 63
LOS: 26 days | DRG: 698 | End: 2023-06-24
Attending: INTERNAL MEDICINE | Admitting: INTERNAL MEDICINE
Payer: MEDICAID

## 2023-01-01 VITALS
SYSTOLIC BLOOD PRESSURE: 88 MMHG | TEMPERATURE: 98 F | HEIGHT: 68 IN | OXYGEN SATURATION: 100 % | WEIGHT: 184.97 LBS | DIASTOLIC BLOOD PRESSURE: 62 MMHG | RESPIRATION RATE: 16 BRPM | HEART RATE: 86 BPM

## 2023-01-01 VITALS — OXYGEN SATURATION: 100 % | HEART RATE: 71 BPM

## 2023-01-01 DIAGNOSIS — R11.10 VOMITING, UNSPECIFIED: ICD-10-CM

## 2023-01-01 DIAGNOSIS — E03.9 HYPOTHYROIDISM, UNSPECIFIED: ICD-10-CM

## 2023-01-01 DIAGNOSIS — N17.9 ACUTE KIDNEY FAILURE, UNSPECIFIED: ICD-10-CM

## 2023-01-01 DIAGNOSIS — R91.8 OTHER NONSPECIFIC ABNORMAL FINDING OF LUNG FIELD: ICD-10-CM

## 2023-01-01 DIAGNOSIS — D69.6 THROMBOCYTOPENIA, UNSPECIFIED: ICD-10-CM

## 2023-01-01 DIAGNOSIS — K20.90 ESOPHAGITIS, UNSPECIFIED WITHOUT BLEEDING: ICD-10-CM

## 2023-01-01 DIAGNOSIS — F31.9 BIPOLAR DISORDER, UNSPECIFIED: ICD-10-CM

## 2023-01-01 DIAGNOSIS — K22.10 ULCER OF ESOPHAGUS WITHOUT BLEEDING: ICD-10-CM

## 2023-01-01 DIAGNOSIS — I50.32 CHRONIC DIASTOLIC (CONGESTIVE) HEART FAILURE: ICD-10-CM

## 2023-01-01 DIAGNOSIS — E43 UNSPECIFIED SEVERE PROTEIN-CALORIE MALNUTRITION: ICD-10-CM

## 2023-01-01 DIAGNOSIS — G93.41 METABOLIC ENCEPHALOPATHY: ICD-10-CM

## 2023-01-01 DIAGNOSIS — R53.81 OTHER MALAISE: ICD-10-CM

## 2023-01-01 DIAGNOSIS — D64.9 ANEMIA, UNSPECIFIED: ICD-10-CM

## 2023-01-01 DIAGNOSIS — E46 UNSPECIFIED PROTEIN-CALORIE MALNUTRITION: ICD-10-CM

## 2023-01-01 DIAGNOSIS — J96.01 ACUTE RESPIRATORY FAILURE WITH HYPOXIA: ICD-10-CM

## 2023-01-01 DIAGNOSIS — R53.2 FUNCTIONAL QUADRIPLEGIA: ICD-10-CM

## 2023-01-01 DIAGNOSIS — E44.0 MODERATE PROTEIN-CALORIE MALNUTRITION: ICD-10-CM

## 2023-01-01 DIAGNOSIS — E87.0 HYPEROSMOLALITY AND HYPERNATREMIA: ICD-10-CM

## 2023-01-01 DIAGNOSIS — R56.9 UNSPECIFIED CONVULSIONS: ICD-10-CM

## 2023-01-01 DIAGNOSIS — N40.0 BENIGN PROSTATIC HYPERPLASIA WITHOUT LOWER URINARY TRACT SYMPTOMS: ICD-10-CM

## 2023-01-01 DIAGNOSIS — Z71.89 OTHER SPECIFIED COUNSELING: ICD-10-CM

## 2023-01-01 DIAGNOSIS — T14.8XXA OTHER INJURY OF UNSPECIFIED BODY REGION, INITIAL ENCOUNTER: ICD-10-CM

## 2023-01-01 DIAGNOSIS — N18.9 CHRONIC KIDNEY DISEASE, UNSPECIFIED: ICD-10-CM

## 2023-01-01 DIAGNOSIS — K92.2 GASTROINTESTINAL HEMORRHAGE, UNSPECIFIED: ICD-10-CM

## 2023-01-01 DIAGNOSIS — E87.8 OTHER DISORDERS OF ELECTROLYTE AND FLUID BALANCE, NOT ELSEWHERE CLASSIFIED: ICD-10-CM

## 2023-01-01 DIAGNOSIS — Z51.5 ENCOUNTER FOR PALLIATIVE CARE: ICD-10-CM

## 2023-01-01 DIAGNOSIS — A41.9 SEPSIS, UNSPECIFIED ORGANISM: ICD-10-CM

## 2023-01-01 DIAGNOSIS — R26.2 DIFFICULTY IN WALKING, NOT ELSEWHERE CLASSIFIED: ICD-10-CM

## 2023-01-01 DIAGNOSIS — Z29.9 ENCOUNTER FOR PROPHYLACTIC MEASURES, UNSPECIFIED: ICD-10-CM

## 2023-01-01 DIAGNOSIS — G93.40 ENCEPHALOPATHY, UNSPECIFIED: ICD-10-CM

## 2023-01-01 DIAGNOSIS — E87.6 HYPOKALEMIA: ICD-10-CM

## 2023-01-01 DIAGNOSIS — Z91.89 OTHER SPECIFIED PERSONAL RISK FACTORS, NOT ELSEWHERE CLASSIFIED: ICD-10-CM

## 2023-01-01 DIAGNOSIS — L89.90 PRESSURE ULCER OF UNSPECIFIED SITE, UNSPECIFIED STAGE: ICD-10-CM

## 2023-01-01 LAB
% ALBUMIN: 62.7 % — SIGNIFICANT CHANGE UP
% ALBUMIN: 62.8 % — SIGNIFICANT CHANGE UP
% ALPHA 1: 5.7 % — SIGNIFICANT CHANGE UP
% ALPHA 1: 5.7 % — SIGNIFICANT CHANGE UP
% ALPHA 2: 6.2 % — SIGNIFICANT CHANGE UP
% ALPHA 2: 6.4 % — SIGNIFICANT CHANGE UP
% BETA: 7.6 % — SIGNIFICANT CHANGE UP
% BETA: 8 % — SIGNIFICANT CHANGE UP
% GAMMA: 17.4 % — SIGNIFICANT CHANGE UP
% GAMMA: 17.5 % — SIGNIFICANT CHANGE UP
% M SPIKE: 5.6 % — SIGNIFICANT CHANGE UP
% M SPIKE: 6.3 % — SIGNIFICANT CHANGE UP
-  AMIKACIN: SIGNIFICANT CHANGE UP
-  AZTREONAM: SIGNIFICANT CHANGE UP
-  CEFEPIME: SIGNIFICANT CHANGE UP
-  CEFTAZIDIME: SIGNIFICANT CHANGE UP
-  CIPROFLOXACIN: SIGNIFICANT CHANGE UP
-  GENTAMICIN: SIGNIFICANT CHANGE UP
-  IMIPENEM: SIGNIFICANT CHANGE UP
-  LEVOFLOXACIN: SIGNIFICANT CHANGE UP
-  MEROPENEM: SIGNIFICANT CHANGE UP
-  PIPERACILLIN/TAZOBACTAM: SIGNIFICANT CHANGE UP
-  TOBRAMYCIN: SIGNIFICANT CHANGE UP
ALBUMIN SERPL ELPH-MCNC: 1.3 G/DL — LOW (ref 3.5–5)
ALBUMIN SERPL ELPH-MCNC: 1.4 G/DL — LOW (ref 3.5–5)
ALBUMIN SERPL ELPH-MCNC: 1.5 G/DL — LOW (ref 3.5–5)
ALBUMIN SERPL ELPH-MCNC: 1.6 G/DL — LOW (ref 3.5–5)
ALBUMIN SERPL ELPH-MCNC: 1.7 G/DL — LOW (ref 3.5–5)
ALBUMIN SERPL ELPH-MCNC: 1.8 G/DL — LOW (ref 3.5–5)
ALBUMIN SERPL ELPH-MCNC: 1.8 G/DL — LOW (ref 3.5–5)
ALBUMIN SERPL ELPH-MCNC: 1.9 G/DL — LOW (ref 3.5–5)
ALBUMIN SERPL ELPH-MCNC: 2 G/DL — LOW (ref 3.5–5)
ALBUMIN SERPL ELPH-MCNC: 2.1 G/DL — LOW (ref 3.5–5)
ALBUMIN SERPL ELPH-MCNC: 2.2 G/DL — LOW (ref 3.5–5)
ALBUMIN SERPL ELPH-MCNC: 2.3 G/DL — LOW (ref 3.5–5)
ALBUMIN SERPL ELPH-MCNC: 2.7 G/DL — LOW (ref 3.5–5)
ALBUMIN SERPL ELPH-MCNC: 2.7 G/DL — LOW (ref 3.6–5.5)
ALBUMIN SERPL ELPH-MCNC: 2.9 G/DL — LOW (ref 3.6–5.5)
ALBUMIN/GLOB SERPL ELPH: 1.7 RATIO — SIGNIFICANT CHANGE UP
ALBUMIN/GLOB SERPL ELPH: 1.7 RATIO — SIGNIFICANT CHANGE UP
ALP SERPL-CCNC: 104 U/L — SIGNIFICANT CHANGE UP (ref 40–120)
ALP SERPL-CCNC: 104 U/L — SIGNIFICANT CHANGE UP (ref 40–120)
ALP SERPL-CCNC: 59 U/L — SIGNIFICANT CHANGE UP (ref 40–120)
ALP SERPL-CCNC: 64 U/L — SIGNIFICANT CHANGE UP (ref 40–120)
ALP SERPL-CCNC: 66 U/L — SIGNIFICANT CHANGE UP (ref 40–120)
ALP SERPL-CCNC: 70 U/L — SIGNIFICANT CHANGE UP (ref 40–120)
ALP SERPL-CCNC: 74 U/L — SIGNIFICANT CHANGE UP (ref 40–120)
ALP SERPL-CCNC: 79 U/L — SIGNIFICANT CHANGE UP (ref 40–120)
ALP SERPL-CCNC: 80 U/L — SIGNIFICANT CHANGE UP (ref 40–120)
ALP SERPL-CCNC: 81 U/L — SIGNIFICANT CHANGE UP (ref 40–120)
ALP SERPL-CCNC: 85 U/L — SIGNIFICANT CHANGE UP (ref 40–120)
ALP SERPL-CCNC: 86 U/L — SIGNIFICANT CHANGE UP (ref 40–120)
ALP SERPL-CCNC: 86 U/L — SIGNIFICANT CHANGE UP (ref 40–120)
ALP SERPL-CCNC: 88 U/L — SIGNIFICANT CHANGE UP (ref 40–120)
ALP SERPL-CCNC: 88 U/L — SIGNIFICANT CHANGE UP (ref 40–120)
ALP SERPL-CCNC: 89 U/L — SIGNIFICANT CHANGE UP (ref 40–120)
ALP SERPL-CCNC: 90 U/L — SIGNIFICANT CHANGE UP (ref 40–120)
ALP SERPL-CCNC: 90 U/L — SIGNIFICANT CHANGE UP (ref 40–120)
ALP SERPL-CCNC: 91 U/L — SIGNIFICANT CHANGE UP (ref 40–120)
ALP SERPL-CCNC: 92 U/L — SIGNIFICANT CHANGE UP (ref 40–120)
ALP SERPL-CCNC: 93 U/L — SIGNIFICANT CHANGE UP (ref 40–120)
ALP SERPL-CCNC: 93 U/L — SIGNIFICANT CHANGE UP (ref 40–120)
ALP SERPL-CCNC: 95 U/L — SIGNIFICANT CHANGE UP (ref 40–120)
ALPHA1 GLOB SERPL ELPH-MCNC: 0.2 G/DL — SIGNIFICANT CHANGE UP (ref 0.1–0.4)
ALPHA1 GLOB SERPL ELPH-MCNC: 0.3 G/DL — SIGNIFICANT CHANGE UP (ref 0.1–0.4)
ALPHA2 GLOB SERPL ELPH-MCNC: 0.3 G/DL — LOW (ref 0.5–1)
ALPHA2 GLOB SERPL ELPH-MCNC: 0.3 G/DL — LOW (ref 0.5–1)
ALT FLD-CCNC: 6 U/L DA — LOW (ref 10–60)
ALT FLD-CCNC: 6 U/L DA — LOW (ref 10–60)
ALT FLD-CCNC: 7 U/L DA — LOW (ref 10–60)
ALT FLD-CCNC: 8 U/L DA — LOW (ref 10–60)
ALT FLD-CCNC: 9 U/L DA — LOW (ref 10–60)
ALT FLD-CCNC: <6 U/L DA — LOW (ref 10–60)
ANA TITR SER: NEGATIVE — SIGNIFICANT CHANGE UP
ANA TITR SER: NEGATIVE — SIGNIFICANT CHANGE UP
ANION GAP SERPL CALC-SCNC: 10 MMOL/L — SIGNIFICANT CHANGE UP (ref 5–17)
ANION GAP SERPL CALC-SCNC: 11 MMOL/L — SIGNIFICANT CHANGE UP (ref 5–17)
ANION GAP SERPL CALC-SCNC: 12 MMOL/L — SIGNIFICANT CHANGE UP (ref 5–17)
ANION GAP SERPL CALC-SCNC: 13 MMOL/L — SIGNIFICANT CHANGE UP (ref 5–17)
ANION GAP SERPL CALC-SCNC: 15 MMOL/L — SIGNIFICANT CHANGE UP (ref 5–17)
ANION GAP SERPL CALC-SCNC: 6 MMOL/L — SIGNIFICANT CHANGE UP (ref 5–17)
ANION GAP SERPL CALC-SCNC: 6 MMOL/L — SIGNIFICANT CHANGE UP (ref 5–17)
ANION GAP SERPL CALC-SCNC: 7 MMOL/L — SIGNIFICANT CHANGE UP (ref 5–17)
ANION GAP SERPL CALC-SCNC: 7 MMOL/L — SIGNIFICANT CHANGE UP (ref 5–17)
ANION GAP SERPL CALC-SCNC: 8 MMOL/L — SIGNIFICANT CHANGE UP (ref 5–17)
ANION GAP SERPL CALC-SCNC: 9 MMOL/L — SIGNIFICANT CHANGE UP (ref 5–17)
ANISOCYTOSIS BLD QL: SLIGHT — SIGNIFICANT CHANGE UP
APPEARANCE UR: ABNORMAL
APPEARANCE UR: CLEAR — SIGNIFICANT CHANGE UP
APTT BLD: 32.5 SEC — SIGNIFICANT CHANGE UP (ref 27.5–35.5)
APTT BLD: 36.9 SEC — HIGH (ref 27.5–35.5)
APTT BLD: 37.6 SEC — HIGH (ref 27.5–35.5)
APTT BLD: 40.2 SEC — HIGH (ref 27.5–35.5)
APTT BLD: 40.7 SEC — HIGH (ref 27.5–35.5)
APTT BLD: 41.5 SEC — HIGH (ref 27.5–35.5)
APTT BLD: 41.6 SEC — HIGH (ref 27.5–35.5)
APTT BLD: 43.7 SEC — HIGH (ref 27.5–35.5)
APTT BLD: 44.5 SEC — HIGH (ref 27.5–35.5)
APTT BLD: 49.1 SEC — HIGH (ref 27.5–35.5)
APTT BLD: 51 SEC — HIGH (ref 27.5–35.5)
APTT BLD: 55.3 SEC — HIGH (ref 27.5–35.5)
AST SERPL-CCNC: 11 U/L — SIGNIFICANT CHANGE UP (ref 10–40)
AST SERPL-CCNC: 11 U/L — SIGNIFICANT CHANGE UP (ref 10–40)
AST SERPL-CCNC: 13 U/L — SIGNIFICANT CHANGE UP (ref 10–40)
AST SERPL-CCNC: 16 U/L — SIGNIFICANT CHANGE UP (ref 10–40)
AST SERPL-CCNC: 16 U/L — SIGNIFICANT CHANGE UP (ref 10–40)
AST SERPL-CCNC: 3 U/L — LOW (ref 10–40)
AST SERPL-CCNC: 4 U/L — LOW (ref 10–40)
AST SERPL-CCNC: 5 U/L — LOW (ref 10–40)
AST SERPL-CCNC: 6 U/L — LOW (ref 10–40)
AST SERPL-CCNC: 6 U/L — LOW (ref 10–40)
AST SERPL-CCNC: 8 U/L — LOW (ref 10–40)
AST SERPL-CCNC: 8 U/L — LOW (ref 10–40)
AST SERPL-CCNC: <3 U/L — LOW (ref 10–40)
B-GLOBULIN SERPL ELPH-MCNC: 0.3 G/DL — LOW (ref 0.5–1)
B-GLOBULIN SERPL ELPH-MCNC: 0.3 G/DL — LOW (ref 0.5–1)
BACTERIA # UR AUTO: ABNORMAL /HPF
BACTERIA # UR AUTO: ABNORMAL /HPF
BASE EXCESS BLDA CALC-SCNC: -10.6 MMOL/L — LOW (ref -2–3)
BASE EXCESS BLDA CALC-SCNC: -10.9 MMOL/L — LOW (ref -2–3)
BASE EXCESS BLDA CALC-SCNC: -11.1 MMOL/L — LOW (ref -2–3)
BASE EXCESS BLDA CALC-SCNC: -15 MMOL/L — LOW (ref -2–3)
BASE EXCESS BLDA CALC-SCNC: -7.8 MMOL/L — LOW (ref -2–3)
BASE EXCESS BLDV CALC-SCNC: -10.9 MMOL/L — SIGNIFICANT CHANGE UP
BASE EXCESS BLDV CALC-SCNC: -16 MMOL/L — SIGNIFICANT CHANGE UP
BASE EXCESS BLDV CALC-SCNC: -4.2 MMOL/L — SIGNIFICANT CHANGE UP
BASE EXCESS BLDV CALC-SCNC: -5.1 MMOL/L — SIGNIFICANT CHANGE UP
BASE EXCESS BLDV CALC-SCNC: -6.6 MMOL/L — SIGNIFICANT CHANGE UP
BASE EXCESS BLDV CALC-SCNC: -7 MMOL/L — SIGNIFICANT CHANGE UP
BASO STIPL BLD QL SMEAR: PRESENT — SIGNIFICANT CHANGE UP
BASO STIPL BLD QL SMEAR: PRESENT — SIGNIFICANT CHANGE UP
BASOPHILS # BLD AUTO: 0 K/UL — SIGNIFICANT CHANGE UP (ref 0–0.2)
BASOPHILS # BLD AUTO: 0.01 K/UL — SIGNIFICANT CHANGE UP (ref 0–0.2)
BASOPHILS # BLD AUTO: 0.02 K/UL — SIGNIFICANT CHANGE UP (ref 0–0.2)
BASOPHILS # BLD AUTO: 0.02 K/UL — SIGNIFICANT CHANGE UP (ref 0–0.2)
BASOPHILS # BLD AUTO: 0.03 K/UL — SIGNIFICANT CHANGE UP (ref 0–0.2)
BASOPHILS # BLD AUTO: 0.04 K/UL — SIGNIFICANT CHANGE UP (ref 0–0.2)
BASOPHILS # BLD AUTO: 0.05 K/UL — SIGNIFICANT CHANGE UP (ref 0–0.2)
BASOPHILS NFR BLD AUTO: 0 % — SIGNIFICANT CHANGE UP (ref 0–2)
BASOPHILS NFR BLD AUTO: 0.2 % — SIGNIFICANT CHANGE UP (ref 0–2)
BASOPHILS NFR BLD AUTO: 0.3 % — SIGNIFICANT CHANGE UP (ref 0–2)
BILIRUB DIRECT SERPL-MCNC: 0.1 MG/DL — SIGNIFICANT CHANGE UP (ref 0–0.3)
BILIRUB DIRECT SERPL-MCNC: <0.1 MG/DL — SIGNIFICANT CHANGE UP (ref 0–0.3)
BILIRUB INDIRECT FLD-MCNC: >0.1 MG/DL — LOW (ref 0.2–1)
BILIRUB SERPL-MCNC: 0.1 MG/DL — LOW (ref 0.2–1.2)
BILIRUB SERPL-MCNC: 0.2 MG/DL — SIGNIFICANT CHANGE UP (ref 0.2–1.2)
BILIRUB SERPL-MCNC: 0.3 MG/DL — SIGNIFICANT CHANGE UP (ref 0.2–1.2)
BILIRUB SERPL-MCNC: 0.4 MG/DL — SIGNIFICANT CHANGE UP (ref 0.2–1.2)
BILIRUB SERPL-MCNC: 0.5 MG/DL — SIGNIFICANT CHANGE UP (ref 0.2–1.2)
BILIRUB SERPL-MCNC: 0.5 MG/DL — SIGNIFICANT CHANGE UP (ref 0.2–1.2)
BILIRUB SERPL-MCNC: 0.6 MG/DL — SIGNIFICANT CHANGE UP (ref 0.2–1.2)
BILIRUB SERPL-MCNC: 0.7 MG/DL — SIGNIFICANT CHANGE UP (ref 0.2–1.2)
BILIRUB UR-MCNC: NEGATIVE — SIGNIFICANT CHANGE UP
BILIRUB UR-MCNC: NEGATIVE — SIGNIFICANT CHANGE UP
BLD GP AB SCN SERPL QL: SIGNIFICANT CHANGE UP
BLOOD GAS COMMENTS ARTERIAL: SIGNIFICANT CHANGE UP
BLOOD GAS COMMENTS, VENOUS: SIGNIFICANT CHANGE UP
BLOOD GAS COMMENTS, VENOUS: SIGNIFICANT CHANGE UP
BUN SERPL-MCNC: 103 MG/DL — HIGH (ref 7–18)
BUN SERPL-MCNC: 40 MG/DL — HIGH (ref 7–18)
BUN SERPL-MCNC: 44 MG/DL — HIGH (ref 7–18)
BUN SERPL-MCNC: 45 MG/DL — HIGH (ref 7–18)
BUN SERPL-MCNC: 46 MG/DL — HIGH (ref 7–18)
BUN SERPL-MCNC: 46 MG/DL — HIGH (ref 7–18)
BUN SERPL-MCNC: 48 MG/DL — HIGH (ref 7–18)
BUN SERPL-MCNC: 49 MG/DL — HIGH (ref 7–18)
BUN SERPL-MCNC: 49 MG/DL — HIGH (ref 7–18)
BUN SERPL-MCNC: 50 MG/DL — HIGH (ref 7–18)
BUN SERPL-MCNC: 50 MG/DL — HIGH (ref 7–18)
BUN SERPL-MCNC: 51 MG/DL — HIGH (ref 7–18)
BUN SERPL-MCNC: 53 MG/DL — HIGH (ref 7–18)
BUN SERPL-MCNC: 56 MG/DL — HIGH (ref 7–18)
BUN SERPL-MCNC: 56 MG/DL — HIGH (ref 7–18)
BUN SERPL-MCNC: 57 MG/DL — HIGH (ref 7–18)
BUN SERPL-MCNC: 59 MG/DL — HIGH (ref 7–18)
BUN SERPL-MCNC: 62 MG/DL — HIGH (ref 7–18)
BUN SERPL-MCNC: 63 MG/DL — HIGH (ref 7–18)
BUN SERPL-MCNC: 64 MG/DL — HIGH (ref 7–18)
BUN SERPL-MCNC: 65 MG/DL — HIGH (ref 7–18)
BUN SERPL-MCNC: 66 MG/DL — HIGH (ref 7–18)
BUN SERPL-MCNC: 67 MG/DL — HIGH (ref 7–18)
BUN SERPL-MCNC: 69 MG/DL — HIGH (ref 7–18)
BUN SERPL-MCNC: 70 MG/DL — HIGH (ref 7–18)
BUN SERPL-MCNC: 71 MG/DL — HIGH (ref 7–18)
BUN SERPL-MCNC: 71 MG/DL — HIGH (ref 7–18)
BUN SERPL-MCNC: 72 MG/DL — HIGH (ref 7–18)
BUN SERPL-MCNC: 72 MG/DL — HIGH (ref 7–18)
BUN SERPL-MCNC: 73 MG/DL — HIGH (ref 7–18)
BUN SERPL-MCNC: 74 MG/DL — HIGH (ref 7–18)
BUN SERPL-MCNC: 84 MG/DL — HIGH (ref 7–18)
BUN SERPL-MCNC: 95 MG/DL — HIGH (ref 7–18)
BUN SERPL-MCNC: 97 MG/DL — HIGH (ref 7–18)
CA-I BLD-SCNC: 5.2 MG/DL — SIGNIFICANT CHANGE UP (ref 4.5–5.6)
CA-I SERPL-SCNC: SIGNIFICANT CHANGE UP MMOL/L (ref 1.15–1.33)
CALCIUM SERPL-MCNC: 10 MG/DL — SIGNIFICANT CHANGE UP (ref 8.4–10.5)
CALCIUM SERPL-MCNC: 10.1 MG/DL — SIGNIFICANT CHANGE UP (ref 8.4–10.5)
CALCIUM SERPL-MCNC: 10.2 MG/DL — SIGNIFICANT CHANGE UP (ref 8.4–10.5)
CALCIUM SERPL-MCNC: 10.3 MG/DL — SIGNIFICANT CHANGE UP (ref 8.4–10.5)
CALCIUM SERPL-MCNC: 10.4 MG/DL — SIGNIFICANT CHANGE UP (ref 8.4–10.5)
CALCIUM SERPL-MCNC: 6.6 MG/DL — LOW (ref 8.4–10.5)
CALCIUM SERPL-MCNC: 8.4 MG/DL — SIGNIFICANT CHANGE UP (ref 8.4–10.5)
CALCIUM SERPL-MCNC: 8.5 MG/DL — SIGNIFICANT CHANGE UP (ref 8.4–10.5)
CALCIUM SERPL-MCNC: 8.5 MG/DL — SIGNIFICANT CHANGE UP (ref 8.4–10.5)
CALCIUM SERPL-MCNC: 8.9 MG/DL — SIGNIFICANT CHANGE UP (ref 8.4–10.5)
CALCIUM SERPL-MCNC: 9.1 MG/DL — SIGNIFICANT CHANGE UP (ref 8.4–10.5)
CALCIUM SERPL-MCNC: 9.3 MG/DL — SIGNIFICANT CHANGE UP (ref 8.4–10.5)
CALCIUM SERPL-MCNC: 9.3 MG/DL — SIGNIFICANT CHANGE UP (ref 8.4–10.5)
CALCIUM SERPL-MCNC: 9.4 MG/DL — SIGNIFICANT CHANGE UP (ref 8.4–10.5)
CALCIUM SERPL-MCNC: 9.5 MG/DL — SIGNIFICANT CHANGE UP (ref 8.4–10.5)
CALCIUM SERPL-MCNC: 9.6 MG/DL — SIGNIFICANT CHANGE UP (ref 8.4–10.5)
CALCIUM SERPL-MCNC: 9.7 MG/DL — SIGNIFICANT CHANGE UP (ref 8.4–10.5)
CALCIUM SERPL-MCNC: 9.8 MG/DL — SIGNIFICANT CHANGE UP (ref 8.4–10.5)
CALCIUM SERPL-MCNC: 9.8 MG/DL — SIGNIFICANT CHANGE UP (ref 8.4–10.5)
CALCIUM SERPL-MCNC: 9.9 MG/DL — SIGNIFICANT CHANGE UP (ref 8.4–10.5)
CALCIUM SERPL-MCNC: 9.9 MG/DL — SIGNIFICANT CHANGE UP (ref 8.4–10.5)
CHLORIDE SERPL-SCNC: 101 MMOL/L — SIGNIFICANT CHANGE UP (ref 96–108)
CHLORIDE SERPL-SCNC: 108 MMOL/L — SIGNIFICANT CHANGE UP (ref 96–108)
CHLORIDE SERPL-SCNC: 112 MMOL/L — HIGH (ref 96–108)
CHLORIDE SERPL-SCNC: 114 MMOL/L — HIGH (ref 96–108)
CHLORIDE SERPL-SCNC: 115 MMOL/L — HIGH (ref 96–108)
CHLORIDE SERPL-SCNC: 116 MMOL/L — HIGH (ref 96–108)
CHLORIDE SERPL-SCNC: 117 MMOL/L — HIGH (ref 96–108)
CHLORIDE SERPL-SCNC: 118 MMOL/L — HIGH (ref 96–108)
CHLORIDE SERPL-SCNC: 119 MMOL/L — HIGH (ref 96–108)
CHLORIDE SERPL-SCNC: 120 MMOL/L — HIGH (ref 96–108)
CHLORIDE SERPL-SCNC: 120 MMOL/L — HIGH (ref 96–108)
CHLORIDE SERPL-SCNC: 125 MMOL/L — HIGH (ref 96–108)
CHLORIDE SERPL-SCNC: 125 MMOL/L — HIGH (ref 96–108)
CHLORIDE SERPL-SCNC: 126 MMOL/L — HIGH (ref 96–108)
CHLORIDE SERPL-SCNC: 128 MMOL/L — HIGH (ref 96–108)
CHLORIDE SERPL-SCNC: 99 MMOL/L — SIGNIFICANT CHANGE UP (ref 96–108)
CLOSURE TME COLL+EPINEP BLD: 30 K/UL — LOW (ref 150–400)
CLOSURE TME COLL+EPINEP BLD: 31 K/UL — LOW (ref 150–400)
CLOSURE TME COLL+EPINEP BLD: 36 K/UL — SIGNIFICANT CHANGE UP (ref 150–400)
CO2 SERPL-SCNC: 12 MMOL/L — LOW (ref 22–31)
CO2 SERPL-SCNC: 13 MMOL/L — LOW (ref 22–31)
CO2 SERPL-SCNC: 14 MMOL/L — LOW (ref 22–31)
CO2 SERPL-SCNC: 14 MMOL/L — LOW (ref 22–31)
CO2 SERPL-SCNC: 15 MMOL/L — LOW (ref 22–31)
CO2 SERPL-SCNC: 16 MMOL/L — LOW (ref 22–31)
CO2 SERPL-SCNC: 17 MMOL/L — LOW (ref 22–31)
CO2 SERPL-SCNC: 18 MMOL/L — LOW (ref 22–31)
CO2 SERPL-SCNC: 19 MMOL/L — LOW (ref 22–31)
CO2 SERPL-SCNC: 20 MMOL/L — LOW (ref 22–31)
CO2 SERPL-SCNC: 21 MMOL/L — LOW (ref 22–31)
COLOR SPEC: SIGNIFICANT CHANGE UP
COLOR SPEC: YELLOW — SIGNIFICANT CHANGE UP
CREAT ?TM UR-MCNC: 30 MG/DL — SIGNIFICANT CHANGE UP
CREAT ?TM UR-MCNC: 31 MG/DL — SIGNIFICANT CHANGE UP
CREAT SERPL-MCNC: 3.11 MG/DL — HIGH (ref 0.5–1.3)
CREAT SERPL-MCNC: 3.17 MG/DL — HIGH (ref 0.5–1.3)
CREAT SERPL-MCNC: 3.18 MG/DL — HIGH (ref 0.5–1.3)
CREAT SERPL-MCNC: 3.44 MG/DL — HIGH (ref 0.5–1.3)
CREAT SERPL-MCNC: 3.52 MG/DL — HIGH (ref 0.5–1.3)
CREAT SERPL-MCNC: 3.69 MG/DL — HIGH (ref 0.5–1.3)
CREAT SERPL-MCNC: 3.8 MG/DL — HIGH (ref 0.5–1.3)
CREAT SERPL-MCNC: 3.81 MG/DL — HIGH (ref 0.5–1.3)
CREAT SERPL-MCNC: 4.12 MG/DL — HIGH (ref 0.5–1.3)
CREAT SERPL-MCNC: 4.46 MG/DL — HIGH (ref 0.5–1.3)
CREAT SERPL-MCNC: 4.48 MG/DL — HIGH (ref 0.5–1.3)
CREAT SERPL-MCNC: 4.63 MG/DL — HIGH (ref 0.5–1.3)
CREAT SERPL-MCNC: 4.66 MG/DL — HIGH (ref 0.5–1.3)
CREAT SERPL-MCNC: 4.7 MG/DL — HIGH (ref 0.5–1.3)
CREAT SERPL-MCNC: 4.73 MG/DL — HIGH (ref 0.5–1.3)
CREAT SERPL-MCNC: 4.79 MG/DL — HIGH (ref 0.5–1.3)
CREAT SERPL-MCNC: 4.8 MG/DL — HIGH (ref 0.5–1.3)
CREAT SERPL-MCNC: 4.82 MG/DL — HIGH (ref 0.5–1.3)
CREAT SERPL-MCNC: 4.82 MG/DL — HIGH (ref 0.5–1.3)
CREAT SERPL-MCNC: 4.88 MG/DL — HIGH (ref 0.5–1.3)
CREAT SERPL-MCNC: 4.91 MG/DL — HIGH (ref 0.5–1.3)
CREAT SERPL-MCNC: 4.94 MG/DL — HIGH (ref 0.5–1.3)
CREAT SERPL-MCNC: 4.98 MG/DL — HIGH (ref 0.5–1.3)
CREAT SERPL-MCNC: 5.02 MG/DL — HIGH (ref 0.5–1.3)
CREAT SERPL-MCNC: 5.11 MG/DL — HIGH (ref 0.5–1.3)
CREAT SERPL-MCNC: 5.15 MG/DL — HIGH (ref 0.5–1.3)
CREAT SERPL-MCNC: 5.16 MG/DL — HIGH (ref 0.5–1.3)
CREAT SERPL-MCNC: 5.17 MG/DL — HIGH (ref 0.5–1.3)
CREAT SERPL-MCNC: 5.17 MG/DL — HIGH (ref 0.5–1.3)
CREAT SERPL-MCNC: 5.22 MG/DL — HIGH (ref 0.5–1.3)
CREAT SERPL-MCNC: 5.23 MG/DL — HIGH (ref 0.5–1.3)
CREAT SERPL-MCNC: 5.27 MG/DL — HIGH (ref 0.5–1.3)
CREAT SERPL-MCNC: 5.31 MG/DL — HIGH (ref 0.5–1.3)
CREAT SERPL-MCNC: 5.4 MG/DL — HIGH (ref 0.5–1.3)
CREAT SERPL-MCNC: 5.41 MG/DL — HIGH (ref 0.5–1.3)
CREAT SERPL-MCNC: 5.43 MG/DL — HIGH (ref 0.5–1.3)
CREAT SERPL-MCNC: 5.43 MG/DL — HIGH (ref 0.5–1.3)
CREAT SERPL-MCNC: 5.55 MG/DL — HIGH (ref 0.5–1.3)
CULTURE RESULTS: SIGNIFICANT CHANGE UP
D DIMER BLD IA.RAPID-MCNC: 282 NG/ML DDU — HIGH
D DIMER BLD IA.RAPID-MCNC: <150 NG/ML DDU — SIGNIFICANT CHANGE UP
DACRYOCYTES BLD QL SMEAR: SLIGHT — SIGNIFICANT CHANGE UP
DIFF PNL FLD: ABNORMAL
DIFF PNL FLD: ABNORMAL
EGFR: 11 ML/MIN/1.73M2 — LOW
EGFR: 12 ML/MIN/1.73M2 — LOW
EGFR: 13 ML/MIN/1.73M2 — LOW
EGFR: 14 ML/MIN/1.73M2 — LOW
EGFR: 14 ML/MIN/1.73M2 — LOW
EGFR: 15 ML/MIN/1.73M2 — LOW
EGFR: 17 ML/MIN/1.73M2 — LOW
EGFR: 17 ML/MIN/1.73M2 — LOW
EGFR: 18 ML/MIN/1.73M2 — LOW
EGFR: 19 ML/MIN/1.73M2 — LOW
EGFR: 19 ML/MIN/1.73M2 — LOW
EGFR: 21 ML/MIN/1.73M2 — LOW
EGFR: 21 ML/MIN/1.73M2 — LOW
EGFR: 22 ML/MIN/1.73M2 — LOW
ELLIPTOCYTES BLD QL SMEAR: SLIGHT — SIGNIFICANT CHANGE UP
EOSINOPHIL # BLD AUTO: 0 K/UL — SIGNIFICANT CHANGE UP (ref 0–0.5)
EOSINOPHIL # BLD AUTO: 0.07 K/UL — SIGNIFICANT CHANGE UP (ref 0–0.5)
EOSINOPHIL # BLD AUTO: 0.07 K/UL — SIGNIFICANT CHANGE UP (ref 0–0.5)
EOSINOPHIL # BLD AUTO: 0.08 K/UL — SIGNIFICANT CHANGE UP (ref 0–0.5)
EOSINOPHIL # BLD AUTO: 0.09 K/UL — SIGNIFICANT CHANGE UP (ref 0–0.5)
EOSINOPHIL # BLD AUTO: 0.1 K/UL — SIGNIFICANT CHANGE UP (ref 0–0.5)
EOSINOPHIL # BLD AUTO: 0.12 K/UL — SIGNIFICANT CHANGE UP (ref 0–0.5)
EOSINOPHIL # BLD AUTO: 0.13 K/UL — SIGNIFICANT CHANGE UP (ref 0–0.5)
EOSINOPHIL # BLD AUTO: 0.14 K/UL — SIGNIFICANT CHANGE UP (ref 0–0.5)
EOSINOPHIL # BLD AUTO: 0.15 K/UL — SIGNIFICANT CHANGE UP (ref 0–0.5)
EOSINOPHIL # BLD AUTO: 0.16 K/UL — SIGNIFICANT CHANGE UP (ref 0–0.5)
EOSINOPHIL NFR BLD AUTO: 0 % — SIGNIFICANT CHANGE UP (ref 0–6)
EOSINOPHIL NFR BLD AUTO: 0.5 % — SIGNIFICANT CHANGE UP (ref 0–6)
EOSINOPHIL NFR BLD AUTO: 0.8 % — SIGNIFICANT CHANGE UP (ref 0–6)
EOSINOPHIL NFR BLD AUTO: 1 % — SIGNIFICANT CHANGE UP (ref 0–6)
EOSINOPHIL NFR BLD AUTO: 1 % — SIGNIFICANT CHANGE UP (ref 0–6)
EOSINOPHIL NFR BLD AUTO: 1.5 % — SIGNIFICANT CHANGE UP (ref 0–6)
EOSINOPHIL NFR BLD AUTO: 1.6 % — SIGNIFICANT CHANGE UP (ref 0–6)
EOSINOPHIL NFR BLD AUTO: 1.7 % — SIGNIFICANT CHANGE UP (ref 0–6)
EOSINOPHIL NFR BLD AUTO: 1.7 % — SIGNIFICANT CHANGE UP (ref 0–6)
EOSINOPHIL NFR BLD AUTO: 1.9 % — SIGNIFICANT CHANGE UP (ref 0–6)
EOSINOPHIL NFR BLD AUTO: 2.6 % — SIGNIFICANT CHANGE UP (ref 0–6)
EOSINOPHIL NFR BLD AUTO: 3 % — SIGNIFICANT CHANGE UP (ref 0–6)
EOSINOPHIL NFR BLD AUTO: 3 % — SIGNIFICANT CHANGE UP (ref 0–6)
EPI CELLS # UR: ABNORMAL /HPF
EPI CELLS # UR: ABNORMAL /HPF
FERRITIN SERPL-MCNC: 1854 NG/ML — HIGH (ref 30–400)
FERRITIN SERPL-MCNC: 1901 NG/ML — HIGH (ref 30–400)
FERRITIN SERPL-MCNC: 2043 NG/ML — HIGH (ref 30–400)
FIBRINOGEN PPP-MCNC: 130 MG/DL — LOW (ref 200–475)
FIBRINOGEN PPP-MCNC: 152 MG/DL — LOW (ref 200–475)
FIBRINOGEN PPP-MCNC: 157 MG/DL — LOW (ref 200–475)
FIBRINOGEN PPP-MCNC: 164 MG/DL — LOW (ref 200–475)
FIBRINOGEN PPP-MCNC: 187 MG/DL — LOW (ref 200–475)
FIBRINOGEN PPP-MCNC: 199 MG/DL — LOW (ref 200–475)
FIBRINOGEN PPP-MCNC: 204 MG/DL — SIGNIFICANT CHANGE UP (ref 200–475)
FIBRINOGEN PPP-MCNC: 213 MG/DL — SIGNIFICANT CHANGE UP (ref 200–475)
FIBRINOGEN PPP-MCNC: 282 MG/DL — SIGNIFICANT CHANGE UP (ref 200–475)
FIBRINOGEN PPP-MCNC: 309 MG/DL — SIGNIFICANT CHANGE UP (ref 200–475)
FIBRINOGEN PPP-MCNC: 413 MG/DL — SIGNIFICANT CHANGE UP (ref 200–475)
FIBRINOGEN PPP-MCNC: 93 MG/DL — CRITICAL LOW (ref 200–475)
FOLATE SERPL-MCNC: >20 NG/ML — SIGNIFICANT CHANGE UP
GAMMA GLOBULIN: 0.7 G/DL — SIGNIFICANT CHANGE UP (ref 0.6–1.6)
GAMMA GLOBULIN: 0.8 G/DL — SIGNIFICANT CHANGE UP (ref 0.6–1.6)
GAS PNL BLDA: SIGNIFICANT CHANGE UP
GAS PNL BLDA: SIGNIFICANT CHANGE UP
GAS PNL BLDV: 141 MMOL/L — SIGNIFICANT CHANGE UP (ref 136–145)
GAS PNL BLDV: 145 MMOL/L — SIGNIFICANT CHANGE UP (ref 136–145)
GAS PNL BLDV: 145 MMOL/L — SIGNIFICANT CHANGE UP (ref 136–145)
GAS PNL BLDV: 147 MMOL/L — HIGH (ref 136–145)
GAS PNL BLDV: SIGNIFICANT CHANGE UP
GLUCOSE BLDC GLUCOMTR-MCNC: 101 MG/DL — HIGH (ref 70–99)
GLUCOSE BLDC GLUCOMTR-MCNC: 104 MG/DL — HIGH (ref 70–99)
GLUCOSE BLDC GLUCOMTR-MCNC: 105 MG/DL — HIGH (ref 70–99)
GLUCOSE BLDC GLUCOMTR-MCNC: 110 MG/DL — HIGH (ref 70–99)
GLUCOSE BLDC GLUCOMTR-MCNC: 167 MG/DL — HIGH (ref 70–99)
GLUCOSE BLDC GLUCOMTR-MCNC: 75 MG/DL — SIGNIFICANT CHANGE UP (ref 70–99)
GLUCOSE BLDC GLUCOMTR-MCNC: 79 MG/DL — SIGNIFICANT CHANGE UP (ref 70–99)
GLUCOSE BLDC GLUCOMTR-MCNC: 80 MG/DL — SIGNIFICANT CHANGE UP (ref 70–99)
GLUCOSE BLDC GLUCOMTR-MCNC: 80 MG/DL — SIGNIFICANT CHANGE UP (ref 70–99)
GLUCOSE BLDC GLUCOMTR-MCNC: 82 MG/DL — SIGNIFICANT CHANGE UP (ref 70–99)
GLUCOSE BLDC GLUCOMTR-MCNC: 82 MG/DL — SIGNIFICANT CHANGE UP (ref 70–99)
GLUCOSE BLDC GLUCOMTR-MCNC: 83 MG/DL — SIGNIFICANT CHANGE UP (ref 70–99)
GLUCOSE BLDC GLUCOMTR-MCNC: 87 MG/DL — SIGNIFICANT CHANGE UP (ref 70–99)
GLUCOSE BLDC GLUCOMTR-MCNC: 88 MG/DL — SIGNIFICANT CHANGE UP (ref 70–99)
GLUCOSE BLDC GLUCOMTR-MCNC: 91 MG/DL — SIGNIFICANT CHANGE UP (ref 70–99)
GLUCOSE BLDC GLUCOMTR-MCNC: 92 MG/DL — SIGNIFICANT CHANGE UP (ref 70–99)
GLUCOSE BLDC GLUCOMTR-MCNC: 97 MG/DL — SIGNIFICANT CHANGE UP (ref 70–99)
GLUCOSE SERPL-MCNC: 101 MG/DL — HIGH (ref 70–99)
GLUCOSE SERPL-MCNC: 103 MG/DL — HIGH (ref 70–99)
GLUCOSE SERPL-MCNC: 105 MG/DL — HIGH (ref 70–99)
GLUCOSE SERPL-MCNC: 106 MG/DL — HIGH (ref 70–99)
GLUCOSE SERPL-MCNC: 107 MG/DL — HIGH (ref 70–99)
GLUCOSE SERPL-MCNC: 108 MG/DL — HIGH (ref 70–99)
GLUCOSE SERPL-MCNC: 109 MG/DL — HIGH (ref 70–99)
GLUCOSE SERPL-MCNC: 111 MG/DL — HIGH (ref 70–99)
GLUCOSE SERPL-MCNC: 115 MG/DL — HIGH (ref 70–99)
GLUCOSE SERPL-MCNC: 115 MG/DL — HIGH (ref 70–99)
GLUCOSE SERPL-MCNC: 116 MG/DL — HIGH (ref 70–99)
GLUCOSE SERPL-MCNC: 117 MG/DL — HIGH (ref 70–99)
GLUCOSE SERPL-MCNC: 120 MG/DL — HIGH (ref 70–99)
GLUCOSE SERPL-MCNC: 126 MG/DL — HIGH (ref 70–99)
GLUCOSE SERPL-MCNC: 175 MG/DL — HIGH (ref 70–99)
GLUCOSE SERPL-MCNC: 68 MG/DL — LOW (ref 70–99)
GLUCOSE SERPL-MCNC: 71 MG/DL — SIGNIFICANT CHANGE UP (ref 70–99)
GLUCOSE SERPL-MCNC: 73 MG/DL — SIGNIFICANT CHANGE UP (ref 70–99)
GLUCOSE SERPL-MCNC: 78 MG/DL — SIGNIFICANT CHANGE UP (ref 70–99)
GLUCOSE SERPL-MCNC: 83 MG/DL — SIGNIFICANT CHANGE UP (ref 70–99)
GLUCOSE SERPL-MCNC: 84 MG/DL — SIGNIFICANT CHANGE UP (ref 70–99)
GLUCOSE SERPL-MCNC: 85 MG/DL — SIGNIFICANT CHANGE UP (ref 70–99)
GLUCOSE SERPL-MCNC: 85 MG/DL — SIGNIFICANT CHANGE UP (ref 70–99)
GLUCOSE SERPL-MCNC: 86 MG/DL — SIGNIFICANT CHANGE UP (ref 70–99)
GLUCOSE SERPL-MCNC: 89 MG/DL — SIGNIFICANT CHANGE UP (ref 70–99)
GLUCOSE SERPL-MCNC: 90 MG/DL — SIGNIFICANT CHANGE UP (ref 70–99)
GLUCOSE SERPL-MCNC: 93 MG/DL — SIGNIFICANT CHANGE UP (ref 70–99)
GLUCOSE SERPL-MCNC: 93 MG/DL — SIGNIFICANT CHANGE UP (ref 70–99)
GLUCOSE SERPL-MCNC: 95 MG/DL — SIGNIFICANT CHANGE UP (ref 70–99)
GLUCOSE SERPL-MCNC: 96 MG/DL — SIGNIFICANT CHANGE UP (ref 70–99)
GLUCOSE SERPL-MCNC: 97 MG/DL — SIGNIFICANT CHANGE UP (ref 70–99)
GLUCOSE SERPL-MCNC: 99 MG/DL — SIGNIFICANT CHANGE UP (ref 70–99)
GLUCOSE SERPL-MCNC: 99 MG/DL — SIGNIFICANT CHANGE UP (ref 70–99)
GLUCOSE UR QL: NEGATIVE — SIGNIFICANT CHANGE UP
GLUCOSE UR QL: NEGATIVE — SIGNIFICANT CHANGE UP
GRAM STN FLD: SIGNIFICANT CHANGE UP
HAPTOGLOB SERPL-MCNC: 44 MG/DL — SIGNIFICANT CHANGE UP (ref 34–200)
HAPTOGLOB SERPL-MCNC: 76 MG/DL — SIGNIFICANT CHANGE UP (ref 34–200)
HAV IGM SER-ACNC: SIGNIFICANT CHANGE UP
HAV IGM SER-ACNC: SIGNIFICANT CHANGE UP
HBV CORE IGM SER-ACNC: SIGNIFICANT CHANGE UP
HBV CORE IGM SER-ACNC: SIGNIFICANT CHANGE UP
HBV SURFACE AG SER-ACNC: SIGNIFICANT CHANGE UP
HCO3 BLDA-SCNC: 11 MMOL/L — LOW (ref 21–28)
HCO3 BLDA-SCNC: 15 MMOL/L — LOW (ref 21–28)
HCO3 BLDA-SCNC: 16 MMOL/L — LOW (ref 21–28)
HCO3 BLDA-SCNC: 16 MMOL/L — LOW (ref 21–28)
HCO3 BLDA-SCNC: 18 MMOL/L — LOW (ref 21–28)
HCO3 BLDV-SCNC: 12 MMOL/L — LOW (ref 22–29)
HCO3 BLDV-SCNC: 17 MMOL/L — LOW (ref 22–29)
HCO3 BLDV-SCNC: 18 MMOL/L — LOW (ref 22–29)
HCO3 BLDV-SCNC: 19 MMOL/L — LOW (ref 22–29)
HCO3 BLDV-SCNC: 20 MMOL/L — LOW (ref 22–29)
HCO3 BLDV-SCNC: 20 MMOL/L — LOW (ref 22–29)
HCT VFR BLD CALC: 17.3 % — CRITICAL LOW (ref 39–50)
HCT VFR BLD CALC: 21.7 % — LOW (ref 39–50)
HCT VFR BLD CALC: 22 % — LOW (ref 39–50)
HCT VFR BLD CALC: 22.3 % — LOW (ref 39–50)
HCT VFR BLD CALC: 22.9 % — LOW (ref 39–50)
HCT VFR BLD CALC: 23 % — LOW (ref 39–50)
HCT VFR BLD CALC: 23.3 % — LOW (ref 39–50)
HCT VFR BLD CALC: 23.9 % — LOW (ref 39–50)
HCT VFR BLD CALC: 24.5 % — LOW (ref 39–50)
HCT VFR BLD CALC: 24.8 % — LOW (ref 39–50)
HCT VFR BLD CALC: 24.8 % — LOW (ref 39–50)
HCT VFR BLD CALC: 24.9 % — LOW (ref 39–50)
HCT VFR BLD CALC: 25.1 % — LOW (ref 39–50)
HCT VFR BLD CALC: 25.4 % — LOW (ref 39–50)
HCT VFR BLD CALC: 25.5 % — LOW (ref 39–50)
HCT VFR BLD CALC: 26 % — LOW (ref 39–50)
HCT VFR BLD CALC: 26.4 % — LOW (ref 39–50)
HCT VFR BLD CALC: 26.5 % — LOW (ref 39–50)
HCT VFR BLD CALC: 26.6 % — LOW (ref 39–50)
HCT VFR BLD CALC: 26.8 % — LOW (ref 39–50)
HCT VFR BLD CALC: 27 % — LOW (ref 39–50)
HCT VFR BLD CALC: 27.1 % — LOW (ref 39–50)
HCT VFR BLD CALC: 27.2 % — LOW (ref 39–50)
HCT VFR BLD CALC: 27.3 % — LOW (ref 39–50)
HCT VFR BLD CALC: 27.4 % — LOW (ref 39–50)
HCT VFR BLD CALC: 27.5 % — LOW (ref 39–50)
HCT VFR BLD CALC: 27.6 % — LOW (ref 39–50)
HCT VFR BLD CALC: 27.9 % — LOW (ref 39–50)
HCT VFR BLD CALC: 28.1 % — LOW (ref 39–50)
HCT VFR BLD CALC: 28.3 % — LOW (ref 39–50)
HCT VFR BLD CALC: 28.4 % — LOW (ref 39–50)
HCT VFR BLD CALC: 28.5 % — LOW (ref 39–50)
HCT VFR BLD CALC: 28.8 % — LOW (ref 39–50)
HCT VFR BLD CALC: 29.5 % — LOW (ref 39–50)
HCT VFR BLD CALC: 29.6 % — LOW (ref 39–50)
HCT VFR BLD CALC: 30.3 % — LOW (ref 39–50)
HCT VFR BLD CALC: 30.5 % — LOW (ref 39–50)
HCT VFR BLD CALC: 31.1 % — LOW (ref 39–50)
HCV AB S/CO SERPL IA: 0.09 S/CO — SIGNIFICANT CHANGE UP (ref 0–0.99)
HCV AB S/CO SERPL IA: 0.13 S/CO — SIGNIFICANT CHANGE UP (ref 0–0.99)
HCV AB SERPL-IMP: SIGNIFICANT CHANGE UP
HCV AB SERPL-IMP: SIGNIFICANT CHANGE UP
HEPARIN-PF4 AB RESULT: <0.6 U/ML — SIGNIFICANT CHANGE UP (ref 0–0.9)
HGB BLD-MCNC: 5.3 G/DL — CRITICAL LOW (ref 13–17)
HGB BLD-MCNC: 6.8 G/DL — CRITICAL LOW (ref 13–17)
HGB BLD-MCNC: 6.9 G/DL — CRITICAL LOW (ref 13–17)
HGB BLD-MCNC: 6.9 G/DL — CRITICAL LOW (ref 13–17)
HGB BLD-MCNC: 7.1 G/DL — LOW (ref 13–17)
HGB BLD-MCNC: 7.1 G/DL — LOW (ref 13–17)
HGB BLD-MCNC: 7.2 G/DL — LOW (ref 13–17)
HGB BLD-MCNC: 7.3 G/DL — LOW (ref 13–17)
HGB BLD-MCNC: 7.4 G/DL — LOW (ref 13–17)
HGB BLD-MCNC: 7.6 G/DL — LOW (ref 13–17)
HGB BLD-MCNC: 7.7 G/DL — LOW (ref 13–17)
HGB BLD-MCNC: 7.7 G/DL — LOW (ref 13–17)
HGB BLD-MCNC: 7.8 G/DL — LOW (ref 13–17)
HGB BLD-MCNC: 7.9 G/DL — LOW (ref 13–17)
HGB BLD-MCNC: 7.9 G/DL — LOW (ref 13–17)
HGB BLD-MCNC: 8 G/DL — LOW (ref 13–17)
HGB BLD-MCNC: 8.2 G/DL — LOW (ref 13–17)
HGB BLD-MCNC: 8.3 G/DL — LOW (ref 13–17)
HGB BLD-MCNC: 8.3 G/DL — LOW (ref 13–17)
HGB BLD-MCNC: 8.4 G/DL — LOW (ref 13–17)
HGB BLD-MCNC: 8.5 G/DL — LOW (ref 13–17)
HGB BLD-MCNC: 8.6 G/DL — LOW (ref 13–17)
HGB BLD-MCNC: 8.6 G/DL — LOW (ref 13–17)
HGB BLD-MCNC: 8.8 G/DL — LOW (ref 13–17)
HGB BLD-MCNC: 8.9 G/DL — LOW (ref 13–17)
HGB BLD-MCNC: 9 G/DL — LOW (ref 13–17)
HGB BLD-MCNC: 9.3 G/DL — LOW (ref 13–17)
HGB BLD-MCNC: 9.3 G/DL — LOW (ref 13–17)
HGB BLD-MCNC: 9.5 G/DL — LOW (ref 13–17)
HGB BLD-MCNC: 9.7 G/DL — LOW (ref 13–17)
HGB BLD-MCNC: 9.9 G/DL — LOW (ref 13–17)
HOROWITZ INDEX BLDA+IHG-RTO: 28 — SIGNIFICANT CHANGE UP
HOROWITZ INDEX BLDA+IHG-RTO: 28 — SIGNIFICANT CHANGE UP
HOROWITZ INDEX BLDA+IHG-RTO: 60 — SIGNIFICANT CHANGE UP
HOROWITZ INDEX BLDA+IHG-RTO: 70 — SIGNIFICANT CHANGE UP
HOROWITZ INDEX BLDA+IHG-RTO: SIGNIFICANT CHANGE UP
HOROWITZ INDEX BLDV+IHG-RTO: 21 — SIGNIFICANT CHANGE UP
HOROWITZ INDEX BLDV+IHG-RTO: 21 — SIGNIFICANT CHANGE UP
HYPOCHROMIA BLD QL: SLIGHT — SIGNIFICANT CHANGE UP
HYPOCHROMIA BLD QL: SLIGHT — SIGNIFICANT CHANGE UP
IMM GRANULOCYTES NFR BLD AUTO: 1.9 % — HIGH (ref 0–0.9)
IMM GRANULOCYTES NFR BLD AUTO: 2.9 % — HIGH (ref 0–0.9)
IMM GRANULOCYTES NFR BLD AUTO: 3.1 % — HIGH (ref 0–0.9)
IMM GRANULOCYTES NFR BLD AUTO: 3.2 % — HIGH (ref 0–0.9)
IMM GRANULOCYTES NFR BLD AUTO: 3.3 % — HIGH (ref 0–0.9)
IMM GRANULOCYTES NFR BLD AUTO: 3.7 % — HIGH (ref 0–0.9)
IMM GRANULOCYTES NFR BLD AUTO: 3.8 % — HIGH (ref 0–0.9)
IMM GRANULOCYTES NFR BLD AUTO: 4.8 % — HIGH (ref 0–0.9)
IMM GRANULOCYTES NFR BLD AUTO: 5.1 % — HIGH (ref 0–0.9)
INR BLD: 0.96 RATIO — SIGNIFICANT CHANGE UP (ref 0.88–1.16)
INR BLD: 1.06 RATIO — SIGNIFICANT CHANGE UP (ref 0.88–1.16)
INR BLD: 1.07 RATIO — SIGNIFICANT CHANGE UP (ref 0.88–1.16)
INR BLD: 1.15 RATIO — SIGNIFICANT CHANGE UP (ref 0.88–1.16)
INR BLD: 1.34 RATIO — HIGH (ref 0.88–1.16)
INR BLD: 1.34 RATIO — HIGH (ref 0.88–1.16)
INR BLD: 1.4 RATIO — HIGH (ref 0.88–1.16)
INR BLD: 1.51 RATIO — HIGH (ref 0.88–1.16)
INR BLD: 1.51 RATIO — HIGH (ref 0.88–1.16)
INR BLD: 1.56 RATIO — HIGH (ref 0.88–1.16)
INR BLD: 1.62 RATIO — HIGH (ref 0.88–1.16)
INR BLD: 1.7 RATIO — HIGH (ref 0.88–1.16)
INR BLD: 1.72 RATIO — HIGH (ref 0.88–1.16)
INTERPRETATION SERPL IFE-IMP: SIGNIFICANT CHANGE UP
INTERPRETATION SERPL IFE-IMP: SIGNIFICANT CHANGE UP
IRON SATN MFR SERPL: 85 % — HIGH (ref 20–55)
IRON SATN MFR SERPL: 94 UG/DL — SIGNIFICANT CHANGE UP (ref 65–170)
KETONES UR-MCNC: ABNORMAL
KETONES UR-MCNC: NEGATIVE — SIGNIFICANT CHANGE UP
LACTATE BLDV-MCNC: 1.1 MMOL/L — SIGNIFICANT CHANGE UP (ref 0.5–2)
LACTATE BLDV-MCNC: 1.1 MMOL/L — SIGNIFICANT CHANGE UP (ref 0.5–2)
LACTATE BLDV-MCNC: 1.5 MMOL/L — SIGNIFICANT CHANGE UP (ref 0.5–2)
LACTATE BLDV-MCNC: 2 MMOL/L — SIGNIFICANT CHANGE UP (ref 0.5–2)
LACTATE SERPL-SCNC: 0.9 MMOL/L — SIGNIFICANT CHANGE UP (ref 0.7–2)
LACTATE SERPL-SCNC: 1.1 MMOL/L — SIGNIFICANT CHANGE UP (ref 0.7–2)
LACTATE SERPL-SCNC: 1.3 MMOL/L — SIGNIFICANT CHANGE UP (ref 0.7–2)
LACTATE SERPL-SCNC: 1.7 MMOL/L — SIGNIFICANT CHANGE UP (ref 0.7–2)
LDH SERPL L TO P-CCNC: 113 U/L — LOW (ref 120–225)
LDH SERPL L TO P-CCNC: 126 U/L — SIGNIFICANT CHANGE UP (ref 120–225)
LEUKOCYTE ESTERASE UR-ACNC: ABNORMAL
LEUKOCYTE ESTERASE UR-ACNC: ABNORMAL
LG PLATELETS BLD QL AUTO: SLIGHT — SIGNIFICANT CHANGE UP
LYMPHOCYTES # BLD AUTO: 0.76 K/UL — LOW (ref 1–3.3)
LYMPHOCYTES # BLD AUTO: 0.84 K/UL — LOW (ref 1–3.3)
LYMPHOCYTES # BLD AUTO: 0.88 K/UL — LOW (ref 1–3.3)
LYMPHOCYTES # BLD AUTO: 0.89 K/UL — LOW (ref 1–3.3)
LYMPHOCYTES # BLD AUTO: 0.91 K/UL — LOW (ref 1–3.3)
LYMPHOCYTES # BLD AUTO: 1.02 K/UL — SIGNIFICANT CHANGE UP (ref 1–3.3)
LYMPHOCYTES # BLD AUTO: 1.02 K/UL — SIGNIFICANT CHANGE UP (ref 1–3.3)
LYMPHOCYTES # BLD AUTO: 1.05 K/UL — SIGNIFICANT CHANGE UP (ref 1–3.3)
LYMPHOCYTES # BLD AUTO: 1.21 K/UL — SIGNIFICANT CHANGE UP (ref 1–3.3)
LYMPHOCYTES # BLD AUTO: 1.28 K/UL — SIGNIFICANT CHANGE UP (ref 1–3.3)
LYMPHOCYTES # BLD AUTO: 1.31 K/UL — SIGNIFICANT CHANGE UP (ref 1–3.3)
LYMPHOCYTES # BLD AUTO: 10 % — LOW (ref 13–44)
LYMPHOCYTES # BLD AUTO: 10.2 % — LOW (ref 13–44)
LYMPHOCYTES # BLD AUTO: 11.1 % — LOW (ref 13–44)
LYMPHOCYTES # BLD AUTO: 13.2 % — SIGNIFICANT CHANGE UP (ref 13–44)
LYMPHOCYTES # BLD AUTO: 14 % — SIGNIFICANT CHANGE UP (ref 13–44)
LYMPHOCYTES # BLD AUTO: 15.8 % — SIGNIFICANT CHANGE UP (ref 13–44)
LYMPHOCYTES # BLD AUTO: 16.6 % — SIGNIFICANT CHANGE UP (ref 13–44)
LYMPHOCYTES # BLD AUTO: 17 % — SIGNIFICANT CHANGE UP (ref 13–44)
LYMPHOCYTES # BLD AUTO: 20 % — SIGNIFICANT CHANGE UP (ref 13–44)
LYMPHOCYTES # BLD AUTO: 20 % — SIGNIFICANT CHANGE UP (ref 13–44)
LYMPHOCYTES # BLD AUTO: 24 % — SIGNIFICANT CHANGE UP (ref 13–44)
LYMPHOCYTES # BLD AUTO: 29 % — SIGNIFICANT CHANGE UP (ref 13–44)
LYMPHOCYTES # BLD AUTO: 6.8 % — LOW (ref 13–44)
M-SPIKE: 0.3 G/DL — HIGH (ref 0–0)
M-SPIKE: 0.3 G/DL — HIGH (ref 0–0)
MACROCYTES BLD QL: SLIGHT — SIGNIFICANT CHANGE UP
MAGNESIUM SERPL-MCNC: 1.4 MG/DL — LOW (ref 1.6–2.6)
MAGNESIUM SERPL-MCNC: 1.5 MG/DL — LOW (ref 1.6–2.6)
MAGNESIUM SERPL-MCNC: 1.6 MG/DL — SIGNIFICANT CHANGE UP (ref 1.6–2.6)
MAGNESIUM SERPL-MCNC: 1.7 MG/DL — SIGNIFICANT CHANGE UP (ref 1.6–2.6)
MAGNESIUM SERPL-MCNC: 1.9 MG/DL — SIGNIFICANT CHANGE UP (ref 1.6–2.6)
MAGNESIUM SERPL-MCNC: 2 MG/DL — SIGNIFICANT CHANGE UP (ref 1.6–2.6)
MAGNESIUM SERPL-MCNC: 2.1 MG/DL — SIGNIFICANT CHANGE UP (ref 1.6–2.6)
MAGNESIUM SERPL-MCNC: 2.2 MG/DL — SIGNIFICANT CHANGE UP (ref 1.6–2.6)
MAGNESIUM SERPL-MCNC: 2.2 MG/DL — SIGNIFICANT CHANGE UP (ref 1.6–2.6)
MAGNESIUM SERPL-MCNC: 2.4 MG/DL — SIGNIFICANT CHANGE UP (ref 1.6–2.6)
MAGNESIUM SERPL-MCNC: 2.8 MG/DL — HIGH (ref 1.6–2.6)
MAGNESIUM SERPL-MCNC: 2.9 MG/DL — HIGH (ref 1.6–2.6)
MAGNESIUM SERPL-MCNC: 2.9 MG/DL — HIGH (ref 1.6–2.6)
MANUAL SMEAR VERIFICATION: SIGNIFICANT CHANGE UP
MCHC RBC-ENTMCNC: 28.9 PG — SIGNIFICANT CHANGE UP (ref 27–34)
MCHC RBC-ENTMCNC: 28.9 PG — SIGNIFICANT CHANGE UP (ref 27–34)
MCHC RBC-ENTMCNC: 29.1 PG — SIGNIFICANT CHANGE UP (ref 27–34)
MCHC RBC-ENTMCNC: 29.2 GM/DL — LOW (ref 32–36)
MCHC RBC-ENTMCNC: 29.2 PG — SIGNIFICANT CHANGE UP (ref 27–34)
MCHC RBC-ENTMCNC: 29.3 PG — SIGNIFICANT CHANGE UP (ref 27–34)
MCHC RBC-ENTMCNC: 29.4 PG — SIGNIFICANT CHANGE UP (ref 27–34)
MCHC RBC-ENTMCNC: 29.4 PG — SIGNIFICANT CHANGE UP (ref 27–34)
MCHC RBC-ENTMCNC: 29.5 PG — SIGNIFICANT CHANGE UP (ref 27–34)
MCHC RBC-ENTMCNC: 29.6 PG — SIGNIFICANT CHANGE UP (ref 27–34)
MCHC RBC-ENTMCNC: 29.7 PG — SIGNIFICANT CHANGE UP (ref 27–34)
MCHC RBC-ENTMCNC: 29.8 GM/DL — LOW (ref 32–36)
MCHC RBC-ENTMCNC: 29.9 GM/DL — LOW (ref 32–36)
MCHC RBC-ENTMCNC: 29.9 PG — SIGNIFICANT CHANGE UP (ref 27–34)
MCHC RBC-ENTMCNC: 29.9 PG — SIGNIFICANT CHANGE UP (ref 27–34)
MCHC RBC-ENTMCNC: 30 GM/DL — LOW (ref 32–36)
MCHC RBC-ENTMCNC: 30 PG — SIGNIFICANT CHANGE UP (ref 27–34)
MCHC RBC-ENTMCNC: 30 PG — SIGNIFICANT CHANGE UP (ref 27–34)
MCHC RBC-ENTMCNC: 30.1 GM/DL — LOW (ref 32–36)
MCHC RBC-ENTMCNC: 30.1 GM/DL — LOW (ref 32–36)
MCHC RBC-ENTMCNC: 30.1 PG — SIGNIFICANT CHANGE UP (ref 27–34)
MCHC RBC-ENTMCNC: 30.1 PG — SIGNIFICANT CHANGE UP (ref 27–34)
MCHC RBC-ENTMCNC: 30.2 GM/DL — LOW (ref 32–36)
MCHC RBC-ENTMCNC: 30.2 GM/DL — LOW (ref 32–36)
MCHC RBC-ENTMCNC: 30.4 GM/DL — LOW (ref 32–36)
MCHC RBC-ENTMCNC: 30.4 GM/DL — LOW (ref 32–36)
MCHC RBC-ENTMCNC: 30.4 PG — SIGNIFICANT CHANGE UP (ref 27–34)
MCHC RBC-ENTMCNC: 30.5 GM/DL — LOW (ref 32–36)
MCHC RBC-ENTMCNC: 30.6 GM/DL — LOW (ref 32–36)
MCHC RBC-ENTMCNC: 30.6 PG — SIGNIFICANT CHANGE UP (ref 27–34)
MCHC RBC-ENTMCNC: 30.6 PG — SIGNIFICANT CHANGE UP (ref 27–34)
MCHC RBC-ENTMCNC: 30.7 PG — SIGNIFICANT CHANGE UP (ref 27–34)
MCHC RBC-ENTMCNC: 30.8 GM/DL — LOW (ref 32–36)
MCHC RBC-ENTMCNC: 30.8 PG — SIGNIFICANT CHANGE UP (ref 27–34)
MCHC RBC-ENTMCNC: 30.8 PG — SIGNIFICANT CHANGE UP (ref 27–34)
MCHC RBC-ENTMCNC: 30.9 GM/DL — LOW (ref 32–36)
MCHC RBC-ENTMCNC: 30.9 PG — SIGNIFICANT CHANGE UP (ref 27–34)
MCHC RBC-ENTMCNC: 30.9 PG — SIGNIFICANT CHANGE UP (ref 27–34)
MCHC RBC-ENTMCNC: 31 GM/DL — LOW (ref 32–36)
MCHC RBC-ENTMCNC: 31 PG — SIGNIFICANT CHANGE UP (ref 27–34)
MCHC RBC-ENTMCNC: 31 PG — SIGNIFICANT CHANGE UP (ref 27–34)
MCHC RBC-ENTMCNC: 31.1 GM/DL — LOW (ref 32–36)
MCHC RBC-ENTMCNC: 31.1 PG — SIGNIFICANT CHANGE UP (ref 27–34)
MCHC RBC-ENTMCNC: 31.1 PG — SIGNIFICANT CHANGE UP (ref 27–34)
MCHC RBC-ENTMCNC: 31.2 PG — SIGNIFICANT CHANGE UP (ref 27–34)
MCHC RBC-ENTMCNC: 31.3 GM/DL — LOW (ref 32–36)
MCHC RBC-ENTMCNC: 31.4 GM/DL — LOW (ref 32–36)
MCHC RBC-ENTMCNC: 31.4 PG — SIGNIFICANT CHANGE UP (ref 27–34)
MCHC RBC-ENTMCNC: 31.4 PG — SIGNIFICANT CHANGE UP (ref 27–34)
MCHC RBC-ENTMCNC: 31.5 GM/DL — LOW (ref 32–36)
MCHC RBC-ENTMCNC: 31.7 GM/DL — LOW (ref 32–36)
MCHC RBC-ENTMCNC: 31.8 GM/DL — LOW (ref 32–36)
MCHC RBC-ENTMCNC: 31.8 GM/DL — LOW (ref 32–36)
MCHC RBC-ENTMCNC: 32.1 GM/DL — SIGNIFICANT CHANGE UP (ref 32–36)
MCHC RBC-ENTMCNC: 32.9 GM/DL — SIGNIFICANT CHANGE UP (ref 32–36)
MCHC RBC-ENTMCNC: 33.1 GM/DL — SIGNIFICANT CHANGE UP (ref 32–36)
MCHC RBC-ENTMCNC: 33.3 GM/DL — SIGNIFICANT CHANGE UP (ref 32–36)
MCHC RBC-ENTMCNC: 33.3 GM/DL — SIGNIFICANT CHANGE UP (ref 32–36)
MCHC RBC-ENTMCNC: 33.6 GM/DL — SIGNIFICANT CHANGE UP (ref 32–36)
MCHC RBC-ENTMCNC: 33.8 GM/DL — SIGNIFICANT CHANGE UP (ref 32–36)
MCV RBC AUTO: 100 FL — SIGNIFICANT CHANGE UP (ref 80–100)
MCV RBC AUTO: 100 FL — SIGNIFICANT CHANGE UP (ref 80–100)
MCV RBC AUTO: 100.7 FL — HIGH (ref 80–100)
MCV RBC AUTO: 100.8 FL — HIGH (ref 80–100)
MCV RBC AUTO: 101 FL — HIGH (ref 80–100)
MCV RBC AUTO: 101.1 FL — HIGH (ref 80–100)
MCV RBC AUTO: 101.4 FL — HIGH (ref 80–100)
MCV RBC AUTO: 107 FL — HIGH (ref 80–100)
MCV RBC AUTO: 86.6 FL — SIGNIFICANT CHANGE UP (ref 80–100)
MCV RBC AUTO: 86.7 FL — SIGNIFICANT CHANGE UP (ref 80–100)
MCV RBC AUTO: 87.5 FL — SIGNIFICANT CHANGE UP (ref 80–100)
MCV RBC AUTO: 87.8 FL — SIGNIFICANT CHANGE UP (ref 80–100)
MCV RBC AUTO: 89.1 FL — SIGNIFICANT CHANGE UP (ref 80–100)
MCV RBC AUTO: 91.6 FL — SIGNIFICANT CHANGE UP (ref 80–100)
MCV RBC AUTO: 91.8 FL — SIGNIFICANT CHANGE UP (ref 80–100)
MCV RBC AUTO: 93.5 FL — SIGNIFICANT CHANGE UP (ref 80–100)
MCV RBC AUTO: 93.5 FL — SIGNIFICANT CHANGE UP (ref 80–100)
MCV RBC AUTO: 93.7 FL — SIGNIFICANT CHANGE UP (ref 80–100)
MCV RBC AUTO: 95.1 FL — SIGNIFICANT CHANGE UP (ref 80–100)
MCV RBC AUTO: 95.2 FL — SIGNIFICANT CHANGE UP (ref 80–100)
MCV RBC AUTO: 95.3 FL — SIGNIFICANT CHANGE UP (ref 80–100)
MCV RBC AUTO: 95.9 FL — SIGNIFICANT CHANGE UP (ref 80–100)
MCV RBC AUTO: 95.9 FL — SIGNIFICANT CHANGE UP (ref 80–100)
MCV RBC AUTO: 96.5 FL — SIGNIFICANT CHANGE UP (ref 80–100)
MCV RBC AUTO: 96.6 FL — SIGNIFICANT CHANGE UP (ref 80–100)
MCV RBC AUTO: 97.3 FL — SIGNIFICANT CHANGE UP (ref 80–100)
MCV RBC AUTO: 97.4 FL — SIGNIFICANT CHANGE UP (ref 80–100)
MCV RBC AUTO: 97.5 FL — SIGNIFICANT CHANGE UP (ref 80–100)
MCV RBC AUTO: 97.8 FL — SIGNIFICANT CHANGE UP (ref 80–100)
MCV RBC AUTO: 97.9 FL — SIGNIFICANT CHANGE UP (ref 80–100)
MCV RBC AUTO: 97.9 FL — SIGNIFICANT CHANGE UP (ref 80–100)
MCV RBC AUTO: 98.1 FL — SIGNIFICANT CHANGE UP (ref 80–100)
MCV RBC AUTO: 98.4 FL — SIGNIFICANT CHANGE UP (ref 80–100)
MCV RBC AUTO: 98.6 FL — SIGNIFICANT CHANGE UP (ref 80–100)
MCV RBC AUTO: 98.8 FL — SIGNIFICANT CHANGE UP (ref 80–100)
MCV RBC AUTO: 98.8 FL — SIGNIFICANT CHANGE UP (ref 80–100)
MCV RBC AUTO: 98.9 FL — SIGNIFICANT CHANGE UP (ref 80–100)
MCV RBC AUTO: 99.1 FL — SIGNIFICANT CHANGE UP (ref 80–100)
MCV RBC AUTO: 99.6 FL — SIGNIFICANT CHANGE UP (ref 80–100)
MCV RBC AUTO: 99.6 FL — SIGNIFICANT CHANGE UP (ref 80–100)
METAMYELOCYTES # FLD: 1 % — HIGH (ref 0–0)
METAMYELOCYTES # FLD: 4 % — HIGH (ref 0–0)
METHOD TYPE: SIGNIFICANT CHANGE UP
MICROCYTES BLD QL: SIGNIFICANT CHANGE UP
MICROCYTES BLD QL: SLIGHT — SIGNIFICANT CHANGE UP
MONOCYTES # BLD AUTO: 0.22 K/UL — SIGNIFICANT CHANGE UP (ref 0–0.9)
MONOCYTES # BLD AUTO: 0.31 K/UL — SIGNIFICANT CHANGE UP (ref 0–0.9)
MONOCYTES # BLD AUTO: 0.41 K/UL — SIGNIFICANT CHANGE UP (ref 0–0.9)
MONOCYTES # BLD AUTO: 0.45 K/UL — SIGNIFICANT CHANGE UP (ref 0–0.9)
MONOCYTES # BLD AUTO: 0.46 K/UL — SIGNIFICANT CHANGE UP (ref 0–0.9)
MONOCYTES # BLD AUTO: 0.5 K/UL — SIGNIFICANT CHANGE UP (ref 0–0.9)
MONOCYTES # BLD AUTO: 0.52 K/UL — SIGNIFICANT CHANGE UP (ref 0–0.9)
MONOCYTES # BLD AUTO: 0.61 K/UL — SIGNIFICANT CHANGE UP (ref 0–0.9)
MONOCYTES # BLD AUTO: 0.64 K/UL — SIGNIFICANT CHANGE UP (ref 0–0.9)
MONOCYTES # BLD AUTO: 0.72 K/UL — SIGNIFICANT CHANGE UP (ref 0–0.9)
MONOCYTES # BLD AUTO: 1 K/UL — HIGH (ref 0–0.9)
MONOCYTES # BLD AUTO: 1.24 K/UL — HIGH (ref 0–0.9)
MONOCYTES # BLD AUTO: 1.36 K/UL — HIGH (ref 0–0.9)
MONOCYTES NFR BLD AUTO: 10.8 % — SIGNIFICANT CHANGE UP (ref 2–14)
MONOCYTES NFR BLD AUTO: 10.8 % — SIGNIFICANT CHANGE UP (ref 2–14)
MONOCYTES NFR BLD AUTO: 11 % — SIGNIFICANT CHANGE UP (ref 2–14)
MONOCYTES NFR BLD AUTO: 7 % — SIGNIFICANT CHANGE UP (ref 2–14)
MONOCYTES NFR BLD AUTO: 7 % — SIGNIFICANT CHANGE UP (ref 2–14)
MONOCYTES NFR BLD AUTO: 7.7 % — SIGNIFICANT CHANGE UP (ref 2–14)
MONOCYTES NFR BLD AUTO: 7.9 % — SIGNIFICANT CHANGE UP (ref 2–14)
MONOCYTES NFR BLD AUTO: 8 % — SIGNIFICANT CHANGE UP (ref 2–14)
MONOCYTES NFR BLD AUTO: 8.1 % — SIGNIFICANT CHANGE UP (ref 2–14)
MONOCYTES NFR BLD AUTO: 8.3 % — SIGNIFICANT CHANGE UP (ref 2–14)
MONOCYTES NFR BLD AUTO: 8.7 % — SIGNIFICANT CHANGE UP (ref 2–14)
MONOCYTES NFR BLD AUTO: 8.9 % — SIGNIFICANT CHANGE UP (ref 2–14)
MONOCYTES NFR BLD AUTO: 9.8 % — SIGNIFICANT CHANGE UP (ref 2–14)
MRSA PCR RESULT.: SIGNIFICANT CHANGE UP
MRSA PCR RESULT.: SIGNIFICANT CHANGE UP
MYELOCYTES NFR BLD: 1 % — HIGH (ref 0–0)
MYELOCYTES NFR BLD: 2 % — HIGH (ref 0–0)
MYELOCYTES NFR BLD: 3 % — HIGH (ref 0–0)
NEUTROPHILS # BLD AUTO: 1.88 K/UL — SIGNIFICANT CHANGE UP (ref 1.8–7.4)
NEUTROPHILS # BLD AUTO: 12.42 K/UL — HIGH (ref 1.8–7.4)
NEUTROPHILS # BLD AUTO: 2.9 K/UL — SIGNIFICANT CHANGE UP (ref 1.8–7.4)
NEUTROPHILS # BLD AUTO: 3.04 K/UL — SIGNIFICANT CHANGE UP (ref 1.8–7.4)
NEUTROPHILS # BLD AUTO: 3.53 K/UL — SIGNIFICANT CHANGE UP (ref 1.8–7.4)
NEUTROPHILS # BLD AUTO: 3.68 K/UL — SIGNIFICANT CHANGE UP (ref 1.8–7.4)
NEUTROPHILS # BLD AUTO: 3.7 K/UL — SIGNIFICANT CHANGE UP (ref 1.8–7.4)
NEUTROPHILS # BLD AUTO: 3.98 K/UL — SIGNIFICANT CHANGE UP (ref 1.8–7.4)
NEUTROPHILS # BLD AUTO: 4.78 K/UL — SIGNIFICANT CHANGE UP (ref 1.8–7.4)
NEUTROPHILS # BLD AUTO: 5.69 K/UL — SIGNIFICANT CHANGE UP (ref 1.8–7.4)
NEUTROPHILS # BLD AUTO: 6.08 K/UL — SIGNIFICANT CHANGE UP (ref 1.8–7.4)
NEUTROPHILS # BLD AUTO: 6.67 K/UL — SIGNIFICANT CHANGE UP (ref 1.8–7.4)
NEUTROPHILS # BLD AUTO: 9.21 K/UL — HIGH (ref 1.8–7.4)
NEUTROPHILS NFR BLD AUTO: 53 % — SIGNIFICANT CHANGE UP (ref 43–77)
NEUTROPHILS NFR BLD AUTO: 60.1 % — SIGNIFICANT CHANGE UP (ref 43–77)
NEUTROPHILS NFR BLD AUTO: 61 % — SIGNIFICANT CHANGE UP (ref 43–77)
NEUTROPHILS NFR BLD AUTO: 63 % — SIGNIFICANT CHANGE UP (ref 43–77)
NEUTROPHILS NFR BLD AUTO: 66.7 % — SIGNIFICANT CHANGE UP (ref 43–77)
NEUTROPHILS NFR BLD AUTO: 69.4 % — SIGNIFICANT CHANGE UP (ref 43–77)
NEUTROPHILS NFR BLD AUTO: 71.3 % — SIGNIFICANT CHANGE UP (ref 43–77)
NEUTROPHILS NFR BLD AUTO: 72.4 % — SIGNIFICANT CHANGE UP (ref 43–77)
NEUTROPHILS NFR BLD AUTO: 73.1 % — SIGNIFICANT CHANGE UP (ref 43–77)
NEUTROPHILS NFR BLD AUTO: 73.5 % — SIGNIFICANT CHANGE UP (ref 43–77)
NEUTROPHILS NFR BLD AUTO: 73.6 % — SIGNIFICANT CHANGE UP (ref 43–77)
NEUTROPHILS NFR BLD AUTO: 80 % — HIGH (ref 43–77)
NEUTROPHILS NFR BLD AUTO: 81 % — HIGH (ref 43–77)
NEUTS BAND # BLD: 3 % — SIGNIFICANT CHANGE UP (ref 0–8)
NEUTS BAND # BLD: 8 % — SIGNIFICANT CHANGE UP (ref 0–8)
NITRITE UR-MCNC: NEGATIVE — SIGNIFICANT CHANGE UP
NITRITE UR-MCNC: NEGATIVE — SIGNIFICANT CHANGE UP
NRBC # BLD: 0 /100 WBCS — SIGNIFICANT CHANGE UP (ref 0–0)
NRBC # BLD: 0 /100 — SIGNIFICANT CHANGE UP (ref 0–0)
NRBC # BLD: 3 /100 — HIGH (ref 0–0)
NRBC # BLD: 4 /100 WBCS — HIGH (ref 0–0)
NRBC # BLD: 6 /100 WBCS — HIGH (ref 0–0)
NT-PROBNP SERPL-SCNC: 4946 PG/ML — HIGH (ref 0–125)
ORGANISM # SPEC MICROSCOPIC CNT: SIGNIFICANT CHANGE UP
OSMOLALITY UR: 261 MOS/KG — SIGNIFICANT CHANGE UP (ref 50–1200)
OVALOCYTES BLD QL SMEAR: SLIGHT — SIGNIFICANT CHANGE UP
P AERUGINOSA DNA BLD POS NAA+NON-PROBE: SIGNIFICANT CHANGE UP
P AERUGINOSA DNA BLD POS NAA+NON-PROBE: SIGNIFICANT CHANGE UP
PCO2 BLDA: 27 MMHG — LOW (ref 35–48)
PCO2 BLDA: 31 MMHG — LOW (ref 35–48)
PCO2 BLDA: 35 MMHG — SIGNIFICANT CHANGE UP (ref 35–48)
PCO2 BLDA: 39 MMHG — SIGNIFICANT CHANGE UP (ref 35–48)
PCO2 BLDA: 39 MMHG — SIGNIFICANT CHANGE UP (ref 35–48)
PCO2 BLDV: 34 MMHG — LOW (ref 42–55)
PCO2 BLDV: 36 MMHG — LOW (ref 42–55)
PCO2 BLDV: 40 MMHG — LOW (ref 42–55)
PCO2 BLDV: 43 MMHG — SIGNIFICANT CHANGE UP (ref 42–55)
PF4 HEPARIN CMPLX AB SER-ACNC: NEGATIVE — SIGNIFICANT CHANGE UP
PH BLDA: 7.22 — LOW (ref 7.35–7.45)
PH BLDA: 7.22 — LOW (ref 7.35–7.45)
PH BLDA: 7.23 — LOW (ref 7.35–7.45)
PH BLDA: 7.28 — LOW (ref 7.35–7.45)
PH BLDA: 7.31 — LOW (ref 7.35–7.45)
PH BLDV: 7.15 — LOW (ref 7.32–7.43)
PH BLDV: 7.2 — LOW (ref 7.32–7.43)
PH BLDV: 7.29 — LOW (ref 7.32–7.43)
PH BLDV: 7.34 — SIGNIFICANT CHANGE UP (ref 7.32–7.43)
PH BLDV: 7.35 — SIGNIFICANT CHANGE UP (ref 7.32–7.43)
PH BLDV: 7.38 — SIGNIFICANT CHANGE UP (ref 7.32–7.43)
PH UR: 5 — SIGNIFICANT CHANGE UP (ref 5–8)
PH UR: 6 — SIGNIFICANT CHANGE UP (ref 5–8)
PHOSPHATE SERPL-MCNC: 1.7 MG/DL — LOW (ref 2.5–4.5)
PHOSPHATE SERPL-MCNC: 2 MG/DL — LOW (ref 2.5–4.5)
PHOSPHATE SERPL-MCNC: 2.1 MG/DL — LOW (ref 2.5–4.5)
PHOSPHATE SERPL-MCNC: 2.1 MG/DL — LOW (ref 2.5–4.5)
PHOSPHATE SERPL-MCNC: 2.2 MG/DL — LOW (ref 2.5–4.5)
PHOSPHATE SERPL-MCNC: 2.2 MG/DL — LOW (ref 2.5–4.5)
PHOSPHATE SERPL-MCNC: 2.3 MG/DL — LOW (ref 2.5–4.5)
PHOSPHATE SERPL-MCNC: 2.3 MG/DL — LOW (ref 2.5–4.5)
PHOSPHATE SERPL-MCNC: 2.4 MG/DL — LOW (ref 2.5–4.5)
PHOSPHATE SERPL-MCNC: 2.5 MG/DL — SIGNIFICANT CHANGE UP (ref 2.5–4.5)
PHOSPHATE SERPL-MCNC: 2.6 MG/DL — SIGNIFICANT CHANGE UP (ref 2.5–4.5)
PHOSPHATE SERPL-MCNC: 2.7 MG/DL — SIGNIFICANT CHANGE UP (ref 2.5–4.5)
PHOSPHATE SERPL-MCNC: 2.8 MG/DL — SIGNIFICANT CHANGE UP (ref 2.5–4.5)
PHOSPHATE SERPL-MCNC: 3.1 MG/DL — SIGNIFICANT CHANGE UP (ref 2.5–4.5)
PHOSPHATE SERPL-MCNC: 3.1 MG/DL — SIGNIFICANT CHANGE UP (ref 2.5–4.5)
PHOSPHATE SERPL-MCNC: 3.2 MG/DL — SIGNIFICANT CHANGE UP (ref 2.5–4.5)
PHOSPHATE SERPL-MCNC: 3.2 MG/DL — SIGNIFICANT CHANGE UP (ref 2.5–4.5)
PHOSPHATE SERPL-MCNC: 3.3 MG/DL — SIGNIFICANT CHANGE UP (ref 2.5–4.5)
PHOSPHATE SERPL-MCNC: 3.4 MG/DL — SIGNIFICANT CHANGE UP (ref 2.5–4.5)
PHOSPHATE SERPL-MCNC: 3.4 MG/DL — SIGNIFICANT CHANGE UP (ref 2.5–4.5)
PHOSPHATE SERPL-MCNC: 3.6 MG/DL — SIGNIFICANT CHANGE UP (ref 2.5–4.5)
PHOSPHATE SERPL-MCNC: 3.9 MG/DL — SIGNIFICANT CHANGE UP (ref 2.5–4.5)
PHOSPHATE SERPL-MCNC: 3.9 MG/DL — SIGNIFICANT CHANGE UP (ref 2.5–4.5)
PHOSPHATE SERPL-MCNC: 4 MG/DL — SIGNIFICANT CHANGE UP (ref 2.5–4.5)
PHOSPHATE SERPL-MCNC: 4.2 MG/DL — SIGNIFICANT CHANGE UP (ref 2.5–4.5)
PHOSPHATE SERPL-MCNC: 4.3 MG/DL — SIGNIFICANT CHANGE UP (ref 2.5–4.5)
PLAT MORPH BLD: NORMAL — SIGNIFICANT CHANGE UP
PLATELET # BLD AUTO: 104 K/UL — LOW (ref 150–400)
PLATELET # BLD AUTO: 111 K/UL — LOW (ref 150–400)
PLATELET # BLD AUTO: 130 K/UL — LOW (ref 150–400)
PLATELET # BLD AUTO: 132 K/UL — LOW (ref 150–400)
PLATELET # BLD AUTO: 171 K/UL — SIGNIFICANT CHANGE UP (ref 150–400)
PLATELET # BLD AUTO: 184 K/UL — SIGNIFICANT CHANGE UP (ref 150–400)
PLATELET # BLD AUTO: 195 K/UL — SIGNIFICANT CHANGE UP (ref 150–400)
PLATELET # BLD AUTO: 205 K/UL — SIGNIFICANT CHANGE UP (ref 150–400)
PLATELET # BLD AUTO: 209 K/UL — SIGNIFICANT CHANGE UP (ref 150–400)
PLATELET # BLD AUTO: 210 K/UL — SIGNIFICANT CHANGE UP (ref 150–400)
PLATELET # BLD AUTO: 211 K/UL — SIGNIFICANT CHANGE UP (ref 150–400)
PLATELET # BLD AUTO: 219 K/UL — SIGNIFICANT CHANGE UP (ref 150–400)
PLATELET # BLD AUTO: 223 K/UL — SIGNIFICANT CHANGE UP (ref 150–400)
PLATELET # BLD AUTO: 224 K/UL — SIGNIFICANT CHANGE UP (ref 150–400)
PLATELET # BLD AUTO: 227 K/UL — SIGNIFICANT CHANGE UP (ref 150–400)
PLATELET # BLD AUTO: 228 K/UL — SIGNIFICANT CHANGE UP (ref 150–400)
PLATELET # BLD AUTO: 247 K/UL — SIGNIFICANT CHANGE UP (ref 150–400)
PLATELET # BLD AUTO: 250 K/UL — SIGNIFICANT CHANGE UP (ref 150–400)
PLATELET # BLD AUTO: 38 K/UL — LOW (ref 150–400)
PLATELET # BLD AUTO: 43 K/UL — LOW (ref 150–400)
PLATELET # BLD AUTO: 44 K/UL — LOW (ref 150–400)
PLATELET # BLD AUTO: 44 K/UL — LOW (ref 150–400)
PLATELET # BLD AUTO: 45 K/UL — LOW (ref 150–400)
PLATELET # BLD AUTO: 46 K/UL — LOW (ref 150–400)
PLATELET # BLD AUTO: 46 K/UL — LOW (ref 150–400)
PLATELET # BLD AUTO: 49 K/UL — LOW (ref 150–400)
PLATELET # BLD AUTO: 49 K/UL — LOW (ref 150–400)
PLATELET # BLD AUTO: 51 K/UL — LOW (ref 150–400)
PLATELET # BLD AUTO: 53 K/UL — LOW (ref 150–400)
PLATELET # BLD AUTO: 58 K/UL — LOW (ref 150–400)
PLATELET # BLD AUTO: 58 K/UL — LOW (ref 150–400)
PLATELET # BLD AUTO: 60 K/UL — LOW (ref 150–400)
PLATELET # BLD AUTO: 71 K/UL — LOW (ref 150–400)
PLATELET # BLD AUTO: 71 K/UL — LOW (ref 150–400)
PLATELET # BLD AUTO: 72 K/UL — LOW (ref 150–400)
PLATELET # BLD AUTO: 81 K/UL — LOW (ref 150–400)
PLATELET # BLD AUTO: 88 K/UL — LOW (ref 150–400)
PLATELET # BLD AUTO: 89 K/UL — LOW (ref 150–400)
PLATELET # BLD AUTO: 89 K/UL — LOW (ref 150–400)
PLATELET # BLD AUTO: 90 K/UL — LOW (ref 150–400)
PLATELET COUNT - ESTIMATE: ABNORMAL
PO2 BLDA: 141 MMHG — HIGH (ref 83–108)
PO2 BLDA: 65 MMHG — LOW (ref 83–108)
PO2 BLDA: 76 MMHG — LOW (ref 83–108)
PO2 BLDA: 80 MMHG — LOW (ref 83–108)
PO2 BLDA: 93 MMHG — SIGNIFICANT CHANGE UP (ref 83–108)
PO2 BLDV: 24 MMHG — SIGNIFICANT CHANGE UP
PO2 BLDV: 25 MMHG — SIGNIFICANT CHANGE UP
PO2 BLDV: 40 MMHG — SIGNIFICANT CHANGE UP
PO2 BLDV: 47 MMHG — SIGNIFICANT CHANGE UP
PO2 BLDV: 54 MMHG — SIGNIFICANT CHANGE UP
PO2 BLDV: 61 MMHG — SIGNIFICANT CHANGE UP
POIKILOCYTOSIS BLD QL AUTO: SLIGHT — SIGNIFICANT CHANGE UP
POLYCHROMASIA BLD QL SMEAR: SLIGHT — SIGNIFICANT CHANGE UP
POLYCHROMASIA BLD QL SMEAR: SLIGHT — SIGNIFICANT CHANGE UP
POTASSIUM BLDV-SCNC: 2.7 MMOL/L — CRITICAL LOW (ref 3.5–5.1)
POTASSIUM BLDV-SCNC: 3.3 MMOL/L — LOW (ref 3.5–5.1)
POTASSIUM BLDV-SCNC: 3.3 MMOL/L — LOW (ref 3.5–5.1)
POTASSIUM BLDV-SCNC: 4 MMOL/L — SIGNIFICANT CHANGE UP (ref 3.5–5.1)
POTASSIUM SERPL-MCNC: 2.7 MMOL/L — CRITICAL LOW (ref 3.5–5.3)
POTASSIUM SERPL-MCNC: 2.8 MMOL/L — CRITICAL LOW (ref 3.5–5.3)
POTASSIUM SERPL-MCNC: 2.9 MMOL/L — CRITICAL LOW (ref 3.5–5.3)
POTASSIUM SERPL-MCNC: 3 MMOL/L — LOW (ref 3.5–5.3)
POTASSIUM SERPL-MCNC: 3 MMOL/L — LOW (ref 3.5–5.3)
POTASSIUM SERPL-MCNC: 3.1 MMOL/L — LOW (ref 3.5–5.3)
POTASSIUM SERPL-MCNC: 3.2 MMOL/L — LOW (ref 3.5–5.3)
POTASSIUM SERPL-MCNC: 3.3 MMOL/L — LOW (ref 3.5–5.3)
POTASSIUM SERPL-MCNC: 3.4 MMOL/L — LOW (ref 3.5–5.3)
POTASSIUM SERPL-MCNC: 3.5 MMOL/L — SIGNIFICANT CHANGE UP (ref 3.5–5.3)
POTASSIUM SERPL-MCNC: 3.6 MMOL/L — SIGNIFICANT CHANGE UP (ref 3.5–5.3)
POTASSIUM SERPL-MCNC: 3.7 MMOL/L — SIGNIFICANT CHANGE UP (ref 3.5–5.3)
POTASSIUM SERPL-MCNC: 3.8 MMOL/L — SIGNIFICANT CHANGE UP (ref 3.5–5.3)
POTASSIUM SERPL-MCNC: 3.8 MMOL/L — SIGNIFICANT CHANGE UP (ref 3.5–5.3)
POTASSIUM SERPL-MCNC: 3.9 MMOL/L — SIGNIFICANT CHANGE UP (ref 3.5–5.3)
POTASSIUM SERPL-MCNC: 4 MMOL/L — SIGNIFICANT CHANGE UP (ref 3.5–5.3)
POTASSIUM SERPL-MCNC: 4 MMOL/L — SIGNIFICANT CHANGE UP (ref 3.5–5.3)
POTASSIUM SERPL-MCNC: 4.1 MMOL/L — SIGNIFICANT CHANGE UP (ref 3.5–5.3)
POTASSIUM SERPL-SCNC: 2.7 MMOL/L — CRITICAL LOW (ref 3.5–5.3)
POTASSIUM SERPL-SCNC: 2.8 MMOL/L — CRITICAL LOW (ref 3.5–5.3)
POTASSIUM SERPL-SCNC: 2.9 MMOL/L — CRITICAL LOW (ref 3.5–5.3)
POTASSIUM SERPL-SCNC: 3 MMOL/L — LOW (ref 3.5–5.3)
POTASSIUM SERPL-SCNC: 3 MMOL/L — LOW (ref 3.5–5.3)
POTASSIUM SERPL-SCNC: 3.1 MMOL/L — LOW (ref 3.5–5.3)
POTASSIUM SERPL-SCNC: 3.2 MMOL/L — LOW (ref 3.5–5.3)
POTASSIUM SERPL-SCNC: 3.3 MMOL/L — LOW (ref 3.5–5.3)
POTASSIUM SERPL-SCNC: 3.4 MMOL/L — LOW (ref 3.5–5.3)
POTASSIUM SERPL-SCNC: 3.5 MMOL/L — SIGNIFICANT CHANGE UP (ref 3.5–5.3)
POTASSIUM SERPL-SCNC: 3.6 MMOL/L — SIGNIFICANT CHANGE UP (ref 3.5–5.3)
POTASSIUM SERPL-SCNC: 3.7 MMOL/L — SIGNIFICANT CHANGE UP (ref 3.5–5.3)
POTASSIUM SERPL-SCNC: 3.8 MMOL/L — SIGNIFICANT CHANGE UP (ref 3.5–5.3)
POTASSIUM SERPL-SCNC: 3.8 MMOL/L — SIGNIFICANT CHANGE UP (ref 3.5–5.3)
POTASSIUM SERPL-SCNC: 3.9 MMOL/L — SIGNIFICANT CHANGE UP (ref 3.5–5.3)
POTASSIUM SERPL-SCNC: 4 MMOL/L — SIGNIFICANT CHANGE UP (ref 3.5–5.3)
POTASSIUM SERPL-SCNC: 4 MMOL/L — SIGNIFICANT CHANGE UP (ref 3.5–5.3)
POTASSIUM SERPL-SCNC: 4.1 MMOL/L — SIGNIFICANT CHANGE UP (ref 3.5–5.3)
PROCALCITONIN SERPL-MCNC: 0.67 NG/ML — HIGH (ref 0.02–0.1)
PROT PATTERN SERPL ELPH-IMP: SIGNIFICANT CHANGE UP
PROT PATTERN SERPL ELPH-IMP: SIGNIFICANT CHANGE UP
PROT SERPL-MCNC: 4.1 G/DL — LOW (ref 6–8.3)
PROT SERPL-MCNC: 4.3 G/DL — LOW (ref 6–8.3)
PROT SERPL-MCNC: 4.3 G/DL — LOW (ref 6–8.3)
PROT SERPL-MCNC: 4.6 G/DL — LOW (ref 6–8.3)
PROT SERPL-MCNC: 4.7 G/DL — LOW (ref 6–8.3)
PROT SERPL-MCNC: 4.7 G/DL — LOW (ref 6–8.3)
PROT SERPL-MCNC: 4.8 G/DL — LOW (ref 6–8.3)
PROT SERPL-MCNC: 4.9 G/DL — LOW (ref 6–8.3)
PROT SERPL-MCNC: 4.9 G/DL — LOW (ref 6–8.3)
PROT SERPL-MCNC: 5 G/DL — LOW (ref 6–8.3)
PROT SERPL-MCNC: 5 G/DL — LOW (ref 6–8.3)
PROT SERPL-MCNC: 5.1 G/DL — LOW (ref 6–8.3)
PROT SERPL-MCNC: 5.1 G/DL — LOW (ref 6–8.3)
PROT SERPL-MCNC: 5.2 G/DL — LOW (ref 6–8.3)
PROT SERPL-MCNC: 5.3 G/DL — LOW (ref 6–8.3)
PROT SERPL-MCNC: 5.4 G/DL — LOW (ref 6–8.3)
PROT SERPL-MCNC: 5.5 G/DL — LOW (ref 6–8.3)
PROT SERPL-MCNC: 5.5 G/DL — LOW (ref 6–8.3)
PROT SERPL-MCNC: 5.7 G/DL — LOW (ref 6–8.3)
PROT SERPL-MCNC: 6 G/DL — SIGNIFICANT CHANGE UP (ref 6–8.3)
PROT UR-MCNC: 100 MG/DL
PROT UR-MCNC: 30 MG/DL
PROTHROM AB SERPL-ACNC: 11.4 SEC — SIGNIFICANT CHANGE UP (ref 10.5–13.4)
PROTHROM AB SERPL-ACNC: 12.6 SEC — SIGNIFICANT CHANGE UP (ref 10.5–13.4)
PROTHROM AB SERPL-ACNC: 12.7 SEC — SIGNIFICANT CHANGE UP (ref 10.5–13.4)
PROTHROM AB SERPL-ACNC: 13.7 SEC — HIGH (ref 10.5–13.4)
PROTHROM AB SERPL-ACNC: 16 SEC — HIGH (ref 10.5–13.4)
PROTHROM AB SERPL-ACNC: 16 SEC — HIGH (ref 10.5–13.4)
PROTHROM AB SERPL-ACNC: 16.7 SEC — HIGH (ref 10.5–13.4)
PROTHROM AB SERPL-ACNC: 18 SEC — HIGH (ref 10.5–13.4)
PROTHROM AB SERPL-ACNC: 18.1 SEC — HIGH (ref 10.5–13.4)
PROTHROM AB SERPL-ACNC: 18.6 SEC — HIGH (ref 10.5–13.4)
PROTHROM AB SERPL-ACNC: 19.4 SEC — HIGH (ref 10.5–13.4)
PROTHROM AB SERPL-ACNC: 20.3 SEC — HIGH (ref 10.5–13.4)
PROTHROM AB SERPL-ACNC: 20.6 SEC — HIGH (ref 10.5–13.4)
PTH-INTACT FLD-MCNC: 33 PG/ML — SIGNIFICANT CHANGE UP (ref 15–65)
PTH-INTACT FLD-MCNC: 35 PG/ML — SIGNIFICANT CHANGE UP (ref 15–65)
RAPID RVP RESULT: SIGNIFICANT CHANGE UP
RAPID RVP RESULT: SIGNIFICANT CHANGE UP
RBC # BLD: 1.73 M/UL — LOW (ref 4.2–5.8)
RBC # BLD: 2.19 M/UL — LOW (ref 4.2–5.8)
RBC # BLD: 2.19 M/UL — LOW (ref 4.2–5.8)
RBC # BLD: 2.29 M/UL — LOW (ref 4.2–5.8)
RBC # BLD: 2.31 M/UL — LOW (ref 4.2–5.8)
RBC # BLD: 2.35 M/UL — LOW (ref 4.2–5.8)
RBC # BLD: 2.37 M/UL — LOW (ref 4.2–5.8)
RBC # BLD: 2.43 M/UL — LOW (ref 4.2–5.8)
RBC # BLD: 2.43 M/UL — LOW (ref 4.2–5.8)
RBC # BLD: 2.45 M/UL — LOW (ref 4.2–5.8)
RBC # BLD: 2.51 M/UL — LOW (ref 4.2–5.8)
RBC # BLD: 2.51 M/UL — LOW (ref 4.2–5.8)
RBC # BLD: 2.56 M/UL — LOW (ref 4.2–5.8)
RBC # BLD: 2.59 M/UL — LOW (ref 4.2–5.8)
RBC # BLD: 2.64 M/UL — LOW (ref 4.2–5.8)
RBC # BLD: 2.64 M/UL — LOW (ref 4.2–5.8)
RBC # BLD: 2.66 M/UL — LOW (ref 4.2–5.8)
RBC # BLD: 2.7 M/UL — LOW (ref 4.2–5.8)
RBC # BLD: 2.71 M/UL — LOW (ref 4.2–5.8)
RBC # BLD: 2.72 M/UL — LOW (ref 4.2–5.8)
RBC # BLD: 2.73 M/UL — LOW (ref 4.2–5.8)
RBC # BLD: 2.76 M/UL — LOW (ref 4.2–5.8)
RBC # BLD: 2.79 M/UL — LOW (ref 4.2–5.8)
RBC # BLD: 2.81 M/UL — LOW (ref 4.2–5.8)
RBC # BLD: 2.83 M/UL — LOW (ref 4.2–5.8)
RBC # BLD: 2.85 M/UL — LOW (ref 4.2–5.8)
RBC # BLD: 2.89 M/UL — LOW (ref 4.2–5.8)
RBC # BLD: 2.92 M/UL — LOW (ref 4.2–5.8)
RBC # BLD: 2.93 M/UL — LOW (ref 4.2–5.8)
RBC # BLD: 2.94 M/UL — LOW (ref 4.2–5.8)
RBC # BLD: 3 M/UL — LOW (ref 4.2–5.8)
RBC # BLD: 3.03 M/UL — LOW (ref 4.2–5.8)
RBC # BLD: 3.08 M/UL — LOW (ref 4.2–5.8)
RBC # BLD: 3.18 M/UL — LOW (ref 4.2–5.8)
RBC # BLD: 3.18 M/UL — LOW (ref 4.2–5.8)
RBC # BLD: 3.19 M/UL — LOW (ref 4.2–5.8)
RBC # BLD: 3.2 M/UL — LOW (ref 4.2–5.8)
RBC # BLD: 3.22 M/UL — LOW (ref 4.2–5.8)
RBC # BLD: 3.22 M/UL — LOW (ref 4.2–5.8)
RBC # FLD: 14.8 % — HIGH (ref 10.3–14.5)
RBC # FLD: 14.8 % — HIGH (ref 10.3–14.5)
RBC # FLD: 14.9 % — HIGH (ref 10.3–14.5)
RBC # FLD: 15 % — HIGH (ref 10.3–14.5)
RBC # FLD: 15.1 % — HIGH (ref 10.3–14.5)
RBC # FLD: 15.2 % — HIGH (ref 10.3–14.5)
RBC # FLD: 15.4 % — HIGH (ref 10.3–14.5)
RBC # FLD: 15.4 % — HIGH (ref 10.3–14.5)
RBC # FLD: 15.7 % — HIGH (ref 10.3–14.5)
RBC # FLD: 15.8 % — HIGH (ref 10.3–14.5)
RBC # FLD: 15.9 % — HIGH (ref 10.3–14.5)
RBC # FLD: 16 % — HIGH (ref 10.3–14.5)
RBC # FLD: 16 % — HIGH (ref 10.3–14.5)
RBC # FLD: 16.2 % — HIGH (ref 10.3–14.5)
RBC # FLD: 16.3 % — HIGH (ref 10.3–14.5)
RBC # FLD: 16.3 % — HIGH (ref 10.3–14.5)
RBC # FLD: 16.4 % — HIGH (ref 10.3–14.5)
RBC # FLD: 16.6 % — HIGH (ref 10.3–14.5)
RBC # FLD: 18.3 % — HIGH (ref 10.3–14.5)
RBC # FLD: 19 % — HIGH (ref 10.3–14.5)
RBC # FLD: 19.4 % — HIGH (ref 10.3–14.5)
RBC # FLD: 19.5 % — HIGH (ref 10.3–14.5)
RBC # FLD: 19.7 % — HIGH (ref 10.3–14.5)
RBC # FLD: 19.9 % — HIGH (ref 10.3–14.5)
RBC # FLD: 20.2 % — HIGH (ref 10.3–14.5)
RBC # FLD: 20.4 % — HIGH (ref 10.3–14.5)
RBC # FLD: 20.5 % — HIGH (ref 10.3–14.5)
RBC # FLD: 20.5 % — HIGH (ref 10.3–14.5)
RBC # FLD: 20.7 % — HIGH (ref 10.3–14.5)
RBC # FLD: 20.7 % — HIGH (ref 10.3–14.5)
RBC # FLD: 20.8 % — HIGH (ref 10.3–14.5)
RBC # FLD: 21.2 % — HIGH (ref 10.3–14.5)
RBC # FLD: 21.4 % — HIGH (ref 10.3–14.5)
RBC # FLD: 21.4 % — HIGH (ref 10.3–14.5)
RBC BLD AUTO: ABNORMAL
RBC CASTS # UR COMP ASSIST: ABNORMAL /HPF (ref 0–2)
RBC CASTS # UR COMP ASSIST: ABNORMAL /HPF (ref 0–2)
RETICS #: 40.1 K/UL — SIGNIFICANT CHANGE UP (ref 25–125)
RETICS #: 46.2 K/UL — SIGNIFICANT CHANGE UP (ref 25–125)
RETICS #: 50.5 K/UL — SIGNIFICANT CHANGE UP (ref 25–125)
RETICS/RBC NFR: 1.3 % — SIGNIFICANT CHANGE UP (ref 0.5–2.5)
RETICS/RBC NFR: 1.8 % — SIGNIFICANT CHANGE UP (ref 0.5–2.5)
RETICS/RBC NFR: 2.1 % — SIGNIFICANT CHANGE UP (ref 0.5–2.5)
RHEUMATOID FACT SERPL-ACNC: <10 IU/ML — SIGNIFICANT CHANGE UP (ref 0–13)
S AUREUS DNA NOSE QL NAA+PROBE: DETECTED
S AUREUS DNA NOSE QL NAA+PROBE: DETECTED
SAO2 % BLDA: 100 % — SIGNIFICANT CHANGE UP
SAO2 % BLDA: 100 % — SIGNIFICANT CHANGE UP
SAO2 % BLDA: 95 % — SIGNIFICANT CHANGE UP
SAO2 % BLDA: 98 % — SIGNIFICANT CHANGE UP
SAO2 % BLDA: 98 % — SIGNIFICANT CHANGE UP
SAO2 % BLDV: 37.2 % — SIGNIFICANT CHANGE UP
SAO2 % BLDV: 42.1 % — SIGNIFICANT CHANGE UP
SAO2 % BLDV: 76.3 % — SIGNIFICANT CHANGE UP
SAO2 % BLDV: 85.3 % — SIGNIFICANT CHANGE UP
SAO2 % BLDV: 87.9 % — SIGNIFICANT CHANGE UP
SAO2 % BLDV: 90.8 % — SIGNIFICANT CHANGE UP
SARS-COV-2 RNA SPEC QL NAA+PROBE: SIGNIFICANT CHANGE UP
SODIUM SERPL-SCNC: 132 MMOL/L — LOW (ref 135–145)
SODIUM SERPL-SCNC: 133 MMOL/L — LOW (ref 135–145)
SODIUM SERPL-SCNC: 135 MMOL/L — SIGNIFICANT CHANGE UP (ref 135–145)
SODIUM SERPL-SCNC: 136 MMOL/L — SIGNIFICANT CHANGE UP (ref 135–145)
SODIUM SERPL-SCNC: 138 MMOL/L — SIGNIFICANT CHANGE UP (ref 135–145)
SODIUM SERPL-SCNC: 139 MMOL/L — SIGNIFICANT CHANGE UP (ref 135–145)
SODIUM SERPL-SCNC: 139 MMOL/L — SIGNIFICANT CHANGE UP (ref 135–145)
SODIUM SERPL-SCNC: 140 MMOL/L — SIGNIFICANT CHANGE UP (ref 135–145)
SODIUM SERPL-SCNC: 140 MMOL/L — SIGNIFICANT CHANGE UP (ref 135–145)
SODIUM SERPL-SCNC: 141 MMOL/L — SIGNIFICANT CHANGE UP (ref 135–145)
SODIUM SERPL-SCNC: 142 MMOL/L — SIGNIFICANT CHANGE UP (ref 135–145)
SODIUM SERPL-SCNC: 142 MMOL/L — SIGNIFICANT CHANGE UP (ref 135–145)
SODIUM SERPL-SCNC: 144 MMOL/L — SIGNIFICANT CHANGE UP (ref 135–145)
SODIUM SERPL-SCNC: 144 MMOL/L — SIGNIFICANT CHANGE UP (ref 135–145)
SODIUM SERPL-SCNC: 145 MMOL/L — SIGNIFICANT CHANGE UP (ref 135–145)
SODIUM SERPL-SCNC: 145 MMOL/L — SIGNIFICANT CHANGE UP (ref 135–145)
SODIUM SERPL-SCNC: 146 MMOL/L — HIGH (ref 135–145)
SODIUM SERPL-SCNC: 147 MMOL/L — HIGH (ref 135–145)
SODIUM SERPL-SCNC: 148 MMOL/L — HIGH (ref 135–145)
SODIUM SERPL-SCNC: 149 MMOL/L — HIGH (ref 135–145)
SODIUM SERPL-SCNC: 150 MMOL/L — HIGH (ref 135–145)
SODIUM SERPL-SCNC: 150 MMOL/L — HIGH (ref 135–145)
SODIUM SERPL-SCNC: 151 MMOL/L — HIGH (ref 135–145)
SODIUM SERPL-SCNC: 152 MMOL/L — HIGH (ref 135–145)
SODIUM SERPL-SCNC: 153 MMOL/L — HIGH (ref 135–145)
SODIUM UR-SCNC: 37 MMOL/L — SIGNIFICANT CHANGE UP
SODIUM UR-SCNC: 63 MMOL/L — SIGNIFICANT CHANGE UP
SODIUM UR-SCNC: 76 MMOL/L — SIGNIFICANT CHANGE UP
SP GR SPEC: 1.01 — SIGNIFICANT CHANGE UP (ref 1.01–1.02)
SP GR SPEC: 1.01 — SIGNIFICANT CHANGE UP (ref 1.01–1.02)
SPECIMEN SOURCE: SIGNIFICANT CHANGE UP
SPHEROCYTES BLD QL SMEAR: SLIGHT — SIGNIFICANT CHANGE UP
T4 FREE SERPL-MCNC: 1.2 NG/DL — SIGNIFICANT CHANGE UP (ref 0.9–1.8)
TIBC SERPL-MCNC: 111 UG/DL — LOW (ref 250–450)
TROPONIN I, HIGH SENSITIVITY RESULT: 26.5 NG/L — SIGNIFICANT CHANGE UP
TSH SERPL-MCNC: 0.09 UU/ML — LOW (ref 0.34–4.82)
UIBC SERPL-MCNC: 17 UG/DL — LOW (ref 110–370)
UNFRACTIONATED HEPARIN INTERPRETATION: SIGNIFICANT CHANGE UP
UNFRACTIONATED HEPARIN RESULT: NEGATIVE — SIGNIFICANT CHANGE UP
UNFRACTIONATED HEPARIN-HIGH DOSE: 0 % — SIGNIFICANT CHANGE UP
UNFRACTIONATED HEPARIN-LOW DOSE: 2 % — SIGNIFICANT CHANGE UP
UROBILINOGEN FLD QL: NEGATIVE — SIGNIFICANT CHANGE UP
UROBILINOGEN FLD QL: NEGATIVE — SIGNIFICANT CHANGE UP
UUN UR-MCNC: 289 MG/DL — SIGNIFICANT CHANGE UP
VALPROATE SERPL-MCNC: 40 UG/ML — LOW (ref 50–100)
VANCOMYCIN TROUGH SERPL-MCNC: 12.4 UG/ML — SIGNIFICANT CHANGE UP (ref 10–20)
VANCOMYCIN TROUGH SERPL-MCNC: 25.6 UG/ML — CRITICAL HIGH (ref 10–20)
VARIANT LYMPHS # BLD: 2 % — SIGNIFICANT CHANGE UP (ref 0–6)
VIT B12 SERPL-MCNC: 1294 PG/ML — HIGH (ref 232–1245)
WBC # BLD: 1.2 K/UL — LOW (ref 3.8–10.5)
WBC # BLD: 1.85 K/UL — LOW (ref 3.8–10.5)
WBC # BLD: 10.49 K/UL — SIGNIFICANT CHANGE UP (ref 3.8–10.5)
WBC # BLD: 12.56 K/UL — HIGH (ref 3.8–10.5)
WBC # BLD: 12.59 K/UL — HIGH (ref 3.8–10.5)
WBC # BLD: 15.34 K/UL — HIGH (ref 3.8–10.5)
WBC # BLD: 3.08 K/UL — LOW (ref 3.8–10.5)
WBC # BLD: 4.39 K/UL — SIGNIFICANT CHANGE UP (ref 3.8–10.5)
WBC # BLD: 4.74 K/UL — SIGNIFICANT CHANGE UP (ref 3.8–10.5)
WBC # BLD: 4.83 K/UL — SIGNIFICANT CHANGE UP (ref 3.8–10.5)
WBC # BLD: 5.05 K/UL — SIGNIFICANT CHANGE UP (ref 3.8–10.5)
WBC # BLD: 5.19 K/UL — SIGNIFICANT CHANGE UP (ref 3.8–10.5)
WBC # BLD: 5.24 K/UL — SIGNIFICANT CHANGE UP (ref 3.8–10.5)
WBC # BLD: 5.3 K/UL — SIGNIFICANT CHANGE UP (ref 3.8–10.5)
WBC # BLD: 5.3 K/UL — SIGNIFICANT CHANGE UP (ref 3.8–10.5)
WBC # BLD: 5.47 K/UL — SIGNIFICANT CHANGE UP (ref 3.8–10.5)
WBC # BLD: 5.82 K/UL — SIGNIFICANT CHANGE UP (ref 3.8–10.5)
WBC # BLD: 6.46 K/UL — SIGNIFICANT CHANGE UP (ref 3.8–10.5)
WBC # BLD: 6.5 K/UL — SIGNIFICANT CHANGE UP (ref 3.8–10.5)
WBC # BLD: 6.53 K/UL — SIGNIFICANT CHANGE UP (ref 3.8–10.5)
WBC # BLD: 6.64 K/UL — SIGNIFICANT CHANGE UP (ref 3.8–10.5)
WBC # BLD: 6.73 K/UL — SIGNIFICANT CHANGE UP (ref 3.8–10.5)
WBC # BLD: 6.99 K/UL — SIGNIFICANT CHANGE UP (ref 3.8–10.5)
WBC # BLD: 7.11 K/UL — SIGNIFICANT CHANGE UP (ref 3.8–10.5)
WBC # BLD: 7.19 K/UL — SIGNIFICANT CHANGE UP (ref 3.8–10.5)
WBC # BLD: 7.4 K/UL — SIGNIFICANT CHANGE UP (ref 3.8–10.5)
WBC # BLD: 7.48 K/UL — SIGNIFICANT CHANGE UP (ref 3.8–10.5)
WBC # BLD: 7.6 K/UL — SIGNIFICANT CHANGE UP (ref 3.8–10.5)
WBC # BLD: 7.67 K/UL — SIGNIFICANT CHANGE UP (ref 3.8–10.5)
WBC # BLD: 7.71 K/UL — SIGNIFICANT CHANGE UP (ref 3.8–10.5)
WBC # BLD: 7.73 K/UL — SIGNIFICANT CHANGE UP (ref 3.8–10.5)
WBC # BLD: 8.04 K/UL — SIGNIFICANT CHANGE UP (ref 3.8–10.5)
WBC # BLD: 8.08 K/UL — SIGNIFICANT CHANGE UP (ref 3.8–10.5)
WBC # BLD: 8.25 K/UL — SIGNIFICANT CHANGE UP (ref 3.8–10.5)
WBC # BLD: 8.47 K/UL — SIGNIFICANT CHANGE UP (ref 3.8–10.5)
WBC # BLD: 8.61 K/UL — SIGNIFICANT CHANGE UP (ref 3.8–10.5)
WBC # BLD: 8.76 K/UL — SIGNIFICANT CHANGE UP (ref 3.8–10.5)
WBC # BLD: 8.95 K/UL — SIGNIFICANT CHANGE UP (ref 3.8–10.5)
WBC # BLD: 9.22 K/UL — SIGNIFICANT CHANGE UP (ref 3.8–10.5)
WBC # BLD: 9.87 K/UL — SIGNIFICANT CHANGE UP (ref 3.8–10.5)
WBC # FLD AUTO: 1.2 K/UL — LOW (ref 3.8–10.5)
WBC # FLD AUTO: 1.85 K/UL — LOW (ref 3.8–10.5)
WBC # FLD AUTO: 10.49 K/UL — SIGNIFICANT CHANGE UP (ref 3.8–10.5)
WBC # FLD AUTO: 12.56 K/UL — HIGH (ref 3.8–10.5)
WBC # FLD AUTO: 12.59 K/UL — HIGH (ref 3.8–10.5)
WBC # FLD AUTO: 15.34 K/UL — HIGH (ref 3.8–10.5)
WBC # FLD AUTO: 3.08 K/UL — LOW (ref 3.8–10.5)
WBC # FLD AUTO: 4.39 K/UL — SIGNIFICANT CHANGE UP (ref 3.8–10.5)
WBC # FLD AUTO: 4.74 K/UL — SIGNIFICANT CHANGE UP (ref 3.8–10.5)
WBC # FLD AUTO: 4.83 K/UL — SIGNIFICANT CHANGE UP (ref 3.8–10.5)
WBC # FLD AUTO: 5.05 K/UL — SIGNIFICANT CHANGE UP (ref 3.8–10.5)
WBC # FLD AUTO: 5.19 K/UL — SIGNIFICANT CHANGE UP (ref 3.8–10.5)
WBC # FLD AUTO: 5.24 K/UL — SIGNIFICANT CHANGE UP (ref 3.8–10.5)
WBC # FLD AUTO: 5.3 K/UL — SIGNIFICANT CHANGE UP (ref 3.8–10.5)
WBC # FLD AUTO: 5.3 K/UL — SIGNIFICANT CHANGE UP (ref 3.8–10.5)
WBC # FLD AUTO: 5.47 K/UL — SIGNIFICANT CHANGE UP (ref 3.8–10.5)
WBC # FLD AUTO: 5.82 K/UL — SIGNIFICANT CHANGE UP (ref 3.8–10.5)
WBC # FLD AUTO: 6.46 K/UL — SIGNIFICANT CHANGE UP (ref 3.8–10.5)
WBC # FLD AUTO: 6.5 K/UL — SIGNIFICANT CHANGE UP (ref 3.8–10.5)
WBC # FLD AUTO: 6.53 K/UL — SIGNIFICANT CHANGE UP (ref 3.8–10.5)
WBC # FLD AUTO: 6.64 K/UL — SIGNIFICANT CHANGE UP (ref 3.8–10.5)
WBC # FLD AUTO: 6.73 K/UL — SIGNIFICANT CHANGE UP (ref 3.8–10.5)
WBC # FLD AUTO: 6.99 K/UL — SIGNIFICANT CHANGE UP (ref 3.8–10.5)
WBC # FLD AUTO: 7.11 K/UL — SIGNIFICANT CHANGE UP (ref 3.8–10.5)
WBC # FLD AUTO: 7.19 K/UL — SIGNIFICANT CHANGE UP (ref 3.8–10.5)
WBC # FLD AUTO: 7.4 K/UL — SIGNIFICANT CHANGE UP (ref 3.8–10.5)
WBC # FLD AUTO: 7.48 K/UL — SIGNIFICANT CHANGE UP (ref 3.8–10.5)
WBC # FLD AUTO: 7.6 K/UL — SIGNIFICANT CHANGE UP (ref 3.8–10.5)
WBC # FLD AUTO: 7.67 K/UL — SIGNIFICANT CHANGE UP (ref 3.8–10.5)
WBC # FLD AUTO: 7.71 K/UL — SIGNIFICANT CHANGE UP (ref 3.8–10.5)
WBC # FLD AUTO: 7.73 K/UL — SIGNIFICANT CHANGE UP (ref 3.8–10.5)
WBC # FLD AUTO: 8.04 K/UL — SIGNIFICANT CHANGE UP (ref 3.8–10.5)
WBC # FLD AUTO: 8.08 K/UL — SIGNIFICANT CHANGE UP (ref 3.8–10.5)
WBC # FLD AUTO: 8.25 K/UL — SIGNIFICANT CHANGE UP (ref 3.8–10.5)
WBC # FLD AUTO: 8.47 K/UL — SIGNIFICANT CHANGE UP (ref 3.8–10.5)
WBC # FLD AUTO: 8.61 K/UL — SIGNIFICANT CHANGE UP (ref 3.8–10.5)
WBC # FLD AUTO: 8.76 K/UL — SIGNIFICANT CHANGE UP (ref 3.8–10.5)
WBC # FLD AUTO: 8.95 K/UL — SIGNIFICANT CHANGE UP (ref 3.8–10.5)
WBC # FLD AUTO: 9.22 K/UL — SIGNIFICANT CHANGE UP (ref 3.8–10.5)
WBC # FLD AUTO: 9.87 K/UL — SIGNIFICANT CHANGE UP (ref 3.8–10.5)
WBC UR QL: ABNORMAL /HPF (ref 0–5)
WBC UR QL: ABNORMAL /HPF (ref 0–5)

## 2023-01-01 PROCEDURE — 86022 PLATELET ANTIBODIES: CPT

## 2023-01-01 PROCEDURE — 99232 SBSQ HOSP IP/OBS MODERATE 35: CPT

## 2023-01-01 PROCEDURE — 83935 ASSAY OF URINE OSMOLALITY: CPT

## 2023-01-01 PROCEDURE — 80164 ASSAY DIPROPYLACETIC ACD TOT: CPT

## 2023-01-01 PROCEDURE — 99223 1ST HOSP IP/OBS HIGH 75: CPT

## 2023-01-01 PROCEDURE — 99232 SBSQ HOSP IP/OBS MODERATE 35: CPT | Mod: 25

## 2023-01-01 PROCEDURE — 43235 EGD DIAGNOSTIC BRUSH WASH: CPT

## 2023-01-01 PROCEDURE — 93971 EXTREMITY STUDY: CPT

## 2023-01-01 PROCEDURE — 80202 ASSAY OF VANCOMYCIN: CPT

## 2023-01-01 PROCEDURE — 87070 CULTURE OTHR SPECIMN AEROBIC: CPT

## 2023-01-01 PROCEDURE — P9012: CPT

## 2023-01-01 PROCEDURE — 82746 ASSAY OF FOLIC ACID SERUM: CPT

## 2023-01-01 PROCEDURE — 82728 ASSAY OF FERRITIN: CPT

## 2023-01-01 PROCEDURE — 85384 FIBRINOGEN ACTIVITY: CPT

## 2023-01-01 PROCEDURE — 86850 RBC ANTIBODY SCREEN: CPT

## 2023-01-01 PROCEDURE — 83615 LACTATE (LD) (LDH) ENZYME: CPT

## 2023-01-01 PROCEDURE — 85730 THROMBOPLASTIN TIME PARTIAL: CPT

## 2023-01-01 PROCEDURE — P9037: CPT

## 2023-01-01 PROCEDURE — 83605 ASSAY OF LACTIC ACID: CPT

## 2023-01-01 PROCEDURE — 71045 X-RAY EXAM CHEST 1 VIEW: CPT | Mod: 26

## 2023-01-01 PROCEDURE — 92610 EVALUATE SWALLOWING FUNCTION: CPT

## 2023-01-01 PROCEDURE — 99497 ADVNCD CARE PLAN 30 MIN: CPT | Mod: 25

## 2023-01-01 PROCEDURE — 85732 THROMBOPLASTIN TIME PARTIAL: CPT

## 2023-01-01 PROCEDURE — 84132 ASSAY OF SERUM POTASSIUM: CPT

## 2023-01-01 PROCEDURE — 86901 BLOOD TYPING SEROLOGIC RH(D): CPT

## 2023-01-01 PROCEDURE — 94003 VENT MGMT INPAT SUBQ DAY: CPT

## 2023-01-01 PROCEDURE — 83010 ASSAY OF HAPTOGLOBIN QUANT: CPT

## 2023-01-01 PROCEDURE — 83880 ASSAY OF NATRIURETIC PEPTIDE: CPT

## 2023-01-01 PROCEDURE — 82330 ASSAY OF CALCIUM: CPT

## 2023-01-01 PROCEDURE — 99285 EMERGENCY DEPT VISIT HI MDM: CPT | Mod: 25

## 2023-01-01 PROCEDURE — 83550 IRON BINDING TEST: CPT

## 2023-01-01 PROCEDURE — 87040 BLOOD CULTURE FOR BACTERIA: CPT

## 2023-01-01 PROCEDURE — 82310 ASSAY OF CALCIUM: CPT

## 2023-01-01 PROCEDURE — 82803 BLOOD GASES ANY COMBINATION: CPT

## 2023-01-01 PROCEDURE — 99285 EMERGENCY DEPT VISIT HI MDM: CPT

## 2023-01-01 PROCEDURE — 93970 EXTREMITY STUDY: CPT | Mod: 26

## 2023-01-01 PROCEDURE — 99233 SBSQ HOSP IP/OBS HIGH 50: CPT

## 2023-01-01 PROCEDURE — 86965 POOLING BLOOD PLATELETS: CPT

## 2023-01-01 PROCEDURE — 82570 ASSAY OF URINE CREATININE: CPT

## 2023-01-01 PROCEDURE — 93010 ELECTROCARDIOGRAM REPORT: CPT

## 2023-01-01 PROCEDURE — 83540 ASSAY OF IRON: CPT

## 2023-01-01 PROCEDURE — 85610 PROTHROMBIN TIME: CPT

## 2023-01-01 PROCEDURE — 87150 DNA/RNA AMPLIFIED PROBE: CPT

## 2023-01-01 PROCEDURE — 86923 COMPATIBILITY TEST ELECTRIC: CPT

## 2023-01-01 PROCEDURE — P9100: CPT

## 2023-01-01 PROCEDURE — 70450 CT HEAD/BRAIN W/O DYE: CPT | Mod: 26,MA

## 2023-01-01 PROCEDURE — 93005 ELECTROCARDIOGRAM TRACING: CPT

## 2023-01-01 PROCEDURE — 36415 COLL VENOUS BLD VENIPUNCTURE: CPT

## 2023-01-01 PROCEDURE — 74176 CT ABD & PELVIS W/O CONTRAST: CPT | Mod: MA

## 2023-01-01 PROCEDURE — 84100 ASSAY OF PHOSPHORUS: CPT

## 2023-01-01 PROCEDURE — 99291 CRITICAL CARE FIRST HOUR: CPT | Mod: GC

## 2023-01-01 PROCEDURE — 86900 BLOOD TYPING SEROLOGIC ABO: CPT

## 2023-01-01 PROCEDURE — 43752 NASAL/OROGASTRIC W/TUBE PLMT: CPT

## 2023-01-01 PROCEDURE — 82962 GLUCOSE BLOOD TEST: CPT

## 2023-01-01 PROCEDURE — 82248 BILIRUBIN DIRECT: CPT

## 2023-01-01 PROCEDURE — 87340 HEPATITIS B SURFACE AG IA: CPT

## 2023-01-01 PROCEDURE — 84295 ASSAY OF SERUM SODIUM: CPT

## 2023-01-01 PROCEDURE — P9045: CPT

## 2023-01-01 PROCEDURE — 99233 SBSQ HOSP IP/OBS HIGH 50: CPT | Mod: GC

## 2023-01-01 PROCEDURE — 84145 PROCALCITONIN (PCT): CPT

## 2023-01-01 PROCEDURE — 93306 TTE W/DOPPLER COMPLETE: CPT

## 2023-01-01 PROCEDURE — 87086 URINE CULTURE/COLONY COUNT: CPT

## 2023-01-01 PROCEDURE — 85027 COMPLETE CBC AUTOMATED: CPT

## 2023-01-01 PROCEDURE — 81001 URINALYSIS AUTO W/SCOPE: CPT

## 2023-01-01 PROCEDURE — P9040: CPT

## 2023-01-01 PROCEDURE — 85379 FIBRIN DEGRADATION QUANT: CPT

## 2023-01-01 PROCEDURE — 99291 CRITICAL CARE FIRST HOUR: CPT

## 2023-01-01 PROCEDURE — 93042 RHYTHM ECG REPORT: CPT

## 2023-01-01 PROCEDURE — 87640 STAPH A DNA AMP PROBE: CPT

## 2023-01-01 PROCEDURE — 99497 ADVNCD CARE PLAN 30 MIN: CPT

## 2023-01-01 PROCEDURE — 84155 ASSAY OF PROTEIN SERUM: CPT

## 2023-01-01 PROCEDURE — 80053 COMPREHEN METABOLIC PANEL: CPT

## 2023-01-01 PROCEDURE — 82607 VITAMIN B-12: CPT

## 2023-01-01 PROCEDURE — 84443 ASSAY THYROID STIM HORMONE: CPT

## 2023-01-01 PROCEDURE — 93041 RHYTHM ECG TRACING: CPT

## 2023-01-01 PROCEDURE — 86334 IMMUNOFIX E-PHORESIS SERUM: CPT

## 2023-01-01 PROCEDURE — 0225U NFCT DS DNA&RNA 21 SARSCOV2: CPT

## 2023-01-01 PROCEDURE — 85045 AUTOMATED RETICULOCYTE COUNT: CPT

## 2023-01-01 PROCEDURE — 84300 ASSAY OF URINE SODIUM: CPT

## 2023-01-01 PROCEDURE — 74176 CT ABD & PELVIS W/O CONTRAST: CPT | Mod: 26,MA

## 2023-01-01 PROCEDURE — 99222 1ST HOSP IP/OBS MODERATE 55: CPT | Mod: 25

## 2023-01-01 PROCEDURE — 87077 CULTURE AEROBIC IDENTIFY: CPT

## 2023-01-01 PROCEDURE — P9047: CPT

## 2023-01-01 PROCEDURE — 87641 MR-STAPH DNA AMP PROBE: CPT

## 2023-01-01 PROCEDURE — 86431 RHEUMATOID FACTOR QUANT: CPT

## 2023-01-01 PROCEDURE — 84439 ASSAY OF FREE THYROXINE: CPT

## 2023-01-01 PROCEDURE — 87635 SARS-COV-2 COVID-19 AMP PRB: CPT

## 2023-01-01 PROCEDURE — 94640 AIRWAY INHALATION TREATMENT: CPT

## 2023-01-01 PROCEDURE — 31720 CLEARANCE OF AIRWAYS: CPT

## 2023-01-01 PROCEDURE — 85670 THROMBIN TIME PLASMA: CPT

## 2023-01-01 PROCEDURE — 93970 EXTREMITY STUDY: CPT

## 2023-01-01 PROCEDURE — 80074 ACUTE HEPATITIS PANEL: CPT

## 2023-01-01 PROCEDURE — 85049 AUTOMATED PLATELET COUNT: CPT

## 2023-01-01 PROCEDURE — 71045 X-RAY EXAM CHEST 1 VIEW: CPT

## 2023-01-01 PROCEDURE — 84484 ASSAY OF TROPONIN QUANT: CPT

## 2023-01-01 PROCEDURE — 83735 ASSAY OF MAGNESIUM: CPT

## 2023-01-01 PROCEDURE — 36430 TRANSFUSION BLD/BLD COMPNT: CPT

## 2023-01-01 PROCEDURE — 80048 BASIC METABOLIC PNL TOTAL CA: CPT

## 2023-01-01 PROCEDURE — 85025 COMPLETE CBC W/AUTO DIFF WBC: CPT

## 2023-01-01 PROCEDURE — 86038 ANTINUCLEAR ANTIBODIES: CPT

## 2023-01-01 PROCEDURE — 84165 PROTEIN E-PHORESIS SERUM: CPT

## 2023-01-01 PROCEDURE — 87186 SC STD MICRODIL/AGAR DIL: CPT

## 2023-01-01 PROCEDURE — 83970 ASSAY OF PARATHORMONE: CPT

## 2023-01-01 PROCEDURE — 96375 TX/PRO/DX INJ NEW DRUG ADDON: CPT

## 2023-01-01 PROCEDURE — 94002 VENT MGMT INPAT INIT DAY: CPT

## 2023-01-01 PROCEDURE — 70450 CT HEAD/BRAIN W/O DYE: CPT | Mod: MA

## 2023-01-01 PROCEDURE — 90792 PSYCH DIAG EVAL W/MED SRVCS: CPT

## 2023-01-01 PROCEDURE — 93971 EXTREMITY STUDY: CPT | Mod: 26,RT

## 2023-01-01 PROCEDURE — 96374 THER/PROPH/DIAG INJ IV PUSH: CPT

## 2023-01-01 PROCEDURE — 84540 ASSAY OF URINE/UREA-N: CPT

## 2023-01-01 PROCEDURE — 82247 BILIRUBIN TOTAL: CPT

## 2023-01-01 PROCEDURE — 99233 SBSQ HOSP IP/OBS HIGH 50: CPT | Mod: 25

## 2023-01-01 PROCEDURE — 71045 X-RAY EXAM CHEST 1 VIEW: CPT | Mod: 26,77

## 2023-01-01 DEVICE — ESOPHAGEAL BALLOON CATH CRE FIXED WIRE 8-9-10MM: Type: IMPLANTABLE DEVICE | Status: FUNCTIONAL

## 2023-01-01 DEVICE — ESOPHAGEAL BALLOON CATH CRE FIXED WIRE 6-7-8MM: Type: IMPLANTABLE DEVICE | Status: FUNCTIONAL

## 2023-01-01 DEVICE — ESOPHAGEAL BALLOON CATH CRE FIXED WIRE 10-11-12MM: Type: IMPLANTABLE DEVICE | Status: FUNCTIONAL

## 2023-01-01 RX ORDER — HEPARIN SODIUM 5000 [USP'U]/ML
5000 INJECTION INTRAVENOUS; SUBCUTANEOUS EVERY 8 HOURS
Refills: 0 | Status: ACTIVE | OUTPATIENT
Start: 2023-01-01 | End: 2024-05-12

## 2023-01-01 RX ORDER — SODIUM CHLORIDE 9 MG/ML
1000 INJECTION, SOLUTION INTRAVENOUS
Refills: 0 | Status: DISCONTINUED | OUTPATIENT
Start: 2023-01-01 | End: 2023-01-01

## 2023-01-01 RX ORDER — TAMSULOSIN HYDROCHLORIDE 0.4 MG/1
0.4 CAPSULE ORAL AT BEDTIME
Refills: 0 | Status: DISCONTINUED | OUTPATIENT
Start: 2023-01-01 | End: 2023-01-01

## 2023-01-01 RX ORDER — DIVALPROEX SODIUM 500 MG/1
2 TABLET, DELAYED RELEASE ORAL
Refills: 0 | DISCHARGE

## 2023-01-01 RX ORDER — CEFEPIME 1 G/1
INJECTION, POWDER, FOR SOLUTION INTRAMUSCULAR; INTRAVENOUS
Refills: 0 | Status: DISCONTINUED | OUTPATIENT
Start: 2023-01-01 | End: 2023-01-01

## 2023-01-01 RX ORDER — MAGNESIUM SULFATE 500 MG/ML
1 VIAL (ML) INJECTION ONCE
Refills: 0 | Status: COMPLETED | OUTPATIENT
Start: 2023-01-01 | End: 2023-01-01

## 2023-01-01 RX ORDER — CHLORHEXIDINE GLUCONATE 213 G/1000ML
1 SOLUTION TOPICAL
Refills: 0 | Status: DISCONTINUED | OUTPATIENT
Start: 2023-01-01 | End: 2023-01-01

## 2023-01-01 RX ORDER — ETOMIDATE 2 MG/ML
20 INJECTION INTRAVENOUS ONCE
Refills: 0 | Status: COMPLETED | OUTPATIENT
Start: 2023-01-01 | End: 2023-01-01

## 2023-01-01 RX ORDER — SODIUM CHLORIDE 9 MG/ML
500 INJECTION, SOLUTION INTRAVENOUS ONCE
Refills: 0 | Status: COMPLETED | OUTPATIENT
Start: 2023-01-01 | End: 2023-01-01

## 2023-01-01 RX ORDER — ACETYLCYSTEINE 200 MG/ML
4 VIAL (ML) MISCELLANEOUS EVERY 4 HOURS
Refills: 0 | Status: DISCONTINUED | OUTPATIENT
Start: 2023-01-01 | End: 2023-01-01

## 2023-01-01 RX ORDER — ALBUMIN HUMAN 25 %
50 VIAL (ML) INTRAVENOUS ONCE
Refills: 0 | Status: COMPLETED | OUTPATIENT
Start: 2023-01-01 | End: 2023-01-01

## 2023-01-01 RX ORDER — POTASSIUM CHLORIDE 20 MEQ
20 PACKET (EA) ORAL
Refills: 0 | Status: COMPLETED | OUTPATIENT
Start: 2023-01-01 | End: 2023-01-01

## 2023-01-01 RX ORDER — POTASSIUM CHLORIDE 20 MEQ
20 PACKET (EA) ORAL ONCE
Refills: 0 | Status: COMPLETED | OUTPATIENT
Start: 2023-01-01 | End: 2023-01-01

## 2023-01-01 RX ORDER — DIVALPROEX SODIUM 500 MG/1
500 TABLET, DELAYED RELEASE ORAL
Refills: 0 | Status: DISCONTINUED | OUTPATIENT
Start: 2023-01-01 | End: 2023-01-01

## 2023-01-01 RX ORDER — PANTOPRAZOLE SODIUM 20 MG/1
40 TABLET, DELAYED RELEASE ORAL DAILY
Refills: 0 | Status: DISCONTINUED | OUTPATIENT
Start: 2023-01-01 | End: 2023-01-01

## 2023-01-01 RX ORDER — POTASSIUM CHLORIDE 20 MEQ
10 PACKET (EA) ORAL
Refills: 0 | Status: COMPLETED | OUTPATIENT
Start: 2023-01-01 | End: 2023-01-01

## 2023-01-01 RX ORDER — SODIUM CHLORIDE 9 MG/ML
1000 INJECTION, SOLUTION INTRAVENOUS ONCE
Refills: 0 | Status: COMPLETED | OUTPATIENT
Start: 2023-01-01 | End: 2023-01-01

## 2023-01-01 RX ORDER — FENTANYL CITRATE 50 UG/ML
50 INJECTION INTRAVENOUS
Refills: 0 | Status: DISCONTINUED | OUTPATIENT
Start: 2023-01-01 | End: 2023-01-01

## 2023-01-01 RX ORDER — ERYTHROPOIETIN 10000 [IU]/ML
10000 INJECTION, SOLUTION INTRAVENOUS; SUBCUTANEOUS
Refills: 0 | Status: DISCONTINUED | OUTPATIENT
Start: 2023-01-01 | End: 2023-01-01

## 2023-01-01 RX ORDER — ALBUTEROL 90 UG/1
2.5 AEROSOL, METERED ORAL EVERY 4 HOURS
Refills: 0 | Status: DISCONTINUED | OUTPATIENT
Start: 2023-01-01 | End: 2023-01-01

## 2023-01-01 RX ORDER — ALBUMIN HUMAN 25 %
250 VIAL (ML) INTRAVENOUS
Refills: 0 | Status: DISCONTINUED | OUTPATIENT
Start: 2023-01-01 | End: 2023-01-01

## 2023-01-01 RX ORDER — ROCURONIUM BROMIDE 10 MG/ML
5 VIAL (ML) INTRAVENOUS ONCE
Refills: 0 | Status: COMPLETED | OUTPATIENT
Start: 2023-01-01 | End: 2023-01-01

## 2023-01-01 RX ORDER — VASOPRESSIN 20 [USP'U]/ML
0.04 INJECTION INTRAVENOUS
Qty: 40 | Refills: 0 | Status: DISCONTINUED | OUTPATIENT
Start: 2023-01-01 | End: 2023-01-01

## 2023-01-01 RX ORDER — PANTOPRAZOLE SODIUM 20 MG/1
40 TABLET, DELAYED RELEASE ORAL
Refills: 0 | Status: DISCONTINUED | OUTPATIENT
Start: 2023-01-01 | End: 2023-01-01

## 2023-01-01 RX ORDER — MAGNESIUM SULFATE 500 MG/ML
2 VIAL (ML) INJECTION
Refills: 0 | Status: COMPLETED | OUTPATIENT
Start: 2023-01-01 | End: 2023-01-01

## 2023-01-01 RX ORDER — CEFEPIME 1 G/1
1000 INJECTION, POWDER, FOR SOLUTION INTRAMUSCULAR; INTRAVENOUS EVERY 12 HOURS
Refills: 0 | Status: DISCONTINUED | OUTPATIENT
Start: 2023-01-01 | End: 2023-01-01

## 2023-01-01 RX ORDER — METOPROLOL TARTRATE 50 MG
5 TABLET ORAL ONCE
Refills: 0 | Status: DISCONTINUED | OUTPATIENT
Start: 2023-01-01 | End: 2023-01-01

## 2023-01-01 RX ORDER — SODIUM CHLORIDE 9 MG/ML
1000 INJECTION, SOLUTION INTRAVENOUS
Refills: 0 | Status: COMPLETED | OUTPATIENT
Start: 2023-01-01 | End: 2023-01-01

## 2023-01-01 RX ORDER — POTASSIUM PHOSPHATE, MONOBASIC POTASSIUM PHOSPHATE, DIBASIC 236; 224 MG/ML; MG/ML
15 INJECTION, SOLUTION INTRAVENOUS ONCE
Refills: 0 | Status: DISCONTINUED | OUTPATIENT
Start: 2023-01-01 | End: 2023-01-01

## 2023-01-01 RX ORDER — SODIUM,POTASSIUM PHOSPHATES 278-250MG
1 POWDER IN PACKET (EA) ORAL ONCE
Refills: 0 | Status: COMPLETED | OUTPATIENT
Start: 2023-01-01 | End: 2023-01-01

## 2023-01-01 RX ORDER — SODIUM BICARBONATE 1 MEQ/ML
100 SYRINGE (ML) INTRAVENOUS ONCE
Refills: 0 | Status: COMPLETED | OUTPATIENT
Start: 2023-01-01 | End: 2023-01-01

## 2023-01-01 RX ORDER — POTASSIUM CHLORIDE 20 MEQ
20 PACKET (EA) ORAL
Refills: 0 | Status: DISCONTINUED | OUTPATIENT
Start: 2023-01-01 | End: 2023-01-01

## 2023-01-01 RX ORDER — ALBUMIN HUMAN 25 %
50 VIAL (ML) INTRAVENOUS EVERY 8 HOURS
Refills: 0 | Status: COMPLETED | OUTPATIENT
Start: 2023-01-01 | End: 2023-01-01

## 2023-01-01 RX ORDER — SODIUM CHLORIDE 9 MG/ML
1000 INJECTION INTRAMUSCULAR; INTRAVENOUS; SUBCUTANEOUS ONCE
Refills: 0 | Status: COMPLETED | OUTPATIENT
Start: 2023-01-01 | End: 2023-01-01

## 2023-01-01 RX ORDER — MEROPENEM 1 G/30ML
500 INJECTION INTRAVENOUS EVERY 12 HOURS
Refills: 0 | Status: DISCONTINUED | OUTPATIENT
Start: 2023-01-01 | End: 2023-01-01

## 2023-01-01 RX ORDER — POTASSIUM CHLORIDE 20 MEQ
40 PACKET (EA) ORAL ONCE
Refills: 0 | Status: DISCONTINUED | OUTPATIENT
Start: 2023-01-01 | End: 2023-01-01

## 2023-01-01 RX ORDER — SODIUM BICARBONATE 1 MEQ/ML
1300 SYRINGE (ML) INTRAVENOUS THREE TIMES A DAY
Refills: 0 | Status: DISCONTINUED | OUTPATIENT
Start: 2023-01-01 | End: 2023-01-01

## 2023-01-01 RX ORDER — PANTOPRAZOLE SODIUM 20 MG/1
80 TABLET, DELAYED RELEASE ORAL ONCE
Refills: 0 | Status: COMPLETED | OUTPATIENT
Start: 2023-01-01 | End: 2023-01-01

## 2023-01-01 RX ORDER — FINASTERIDE 5 MG/1
5 TABLET, FILM COATED ORAL DAILY
Refills: 0 | Status: DISCONTINUED | OUTPATIENT
Start: 2023-01-01 | End: 2023-01-01

## 2023-01-01 RX ORDER — TAMSULOSIN HYDROCHLORIDE 0.4 MG/1
1 CAPSULE ORAL
Qty: 0 | Refills: 0 | DISCHARGE

## 2023-01-01 RX ORDER — ONDANSETRON 8 MG/1
4 TABLET, FILM COATED ORAL ONCE
Refills: 0 | Status: COMPLETED | OUTPATIENT
Start: 2023-01-01 | End: 2023-01-01

## 2023-01-01 RX ORDER — METOCLOPRAMIDE HCL 10 MG
5 TABLET ORAL EVERY 6 HOURS
Refills: 0 | Status: DISCONTINUED | OUTPATIENT
Start: 2023-01-01 | End: 2023-01-01

## 2023-01-01 RX ORDER — PANTOPRAZOLE SODIUM 20 MG/1
40 TABLET, DELAYED RELEASE ORAL EVERY 12 HOURS
Refills: 0 | Status: DISCONTINUED | OUTPATIENT
Start: 2023-01-01 | End: 2023-01-01

## 2023-01-01 RX ORDER — DORZOLAMIDE HYDROCHLORIDE 20 MG/ML
1 SOLUTION/ DROPS OPHTHALMIC
Refills: 0 | Status: DISCONTINUED | OUTPATIENT
Start: 2023-01-01 | End: 2023-01-01

## 2023-01-01 RX ORDER — VALPROIC ACID (AS SODIUM SALT) 250 MG/5ML
500 SOLUTION, ORAL ORAL
Refills: 0 | Status: DISCONTINUED | OUTPATIENT
Start: 2023-01-01 | End: 2023-01-01

## 2023-01-01 RX ORDER — POTASSIUM CHLORIDE 20 MEQ
10 PACKET (EA) ORAL
Refills: 0 | Status: DISCONTINUED | OUTPATIENT
Start: 2023-01-01 | End: 2023-01-01

## 2023-01-01 RX ORDER — POTASSIUM CHLORIDE 20 MEQ
40 PACKET (EA) ORAL ONCE
Refills: 0 | Status: COMPLETED | OUTPATIENT
Start: 2023-01-01 | End: 2023-01-01

## 2023-01-01 RX ORDER — SEVELAMER CARBONATE 2400 MG/1
1 POWDER, FOR SUSPENSION ORAL
Refills: 0 | DISCHARGE

## 2023-01-01 RX ORDER — CEFEPIME 1 G/1
1000 INJECTION, POWDER, FOR SOLUTION INTRAMUSCULAR; INTRAVENOUS ONCE
Refills: 0 | Status: COMPLETED | OUTPATIENT
Start: 2023-01-01 | End: 2023-01-01

## 2023-01-01 RX ORDER — FENTANYL CITRATE 50 UG/ML
1 INJECTION INTRAVENOUS
Qty: 2500 | Refills: 0 | Status: DISCONTINUED | OUTPATIENT
Start: 2023-01-01 | End: 2023-01-01

## 2023-01-01 RX ORDER — ACETAMINOPHEN 500 MG
1000 TABLET ORAL ONCE
Refills: 0 | Status: COMPLETED | OUTPATIENT
Start: 2023-01-01 | End: 2023-01-01

## 2023-01-01 RX ORDER — POTASSIUM PHOSPHATE, MONOBASIC POTASSIUM PHOSPHATE, DIBASIC 236; 224 MG/ML; MG/ML
30 INJECTION, SOLUTION INTRAVENOUS ONCE
Refills: 0 | Status: COMPLETED | OUTPATIENT
Start: 2023-01-01 | End: 2023-01-01

## 2023-01-01 RX ORDER — LEVOTHYROXINE SODIUM 125 MCG
137 TABLET ORAL DAILY
Refills: 0 | Status: DISCONTINUED | OUTPATIENT
Start: 2023-01-01 | End: 2023-01-01

## 2023-01-01 RX ORDER — HALOPERIDOL DECANOATE 100 MG/ML
1 INJECTION INTRAMUSCULAR EVERY 6 HOURS
Refills: 0 | Status: DISCONTINUED | OUTPATIENT
Start: 2023-01-01 | End: 2023-01-01

## 2023-01-01 RX ORDER — VANCOMYCIN HCL 1 G
1000 VIAL (EA) INTRAVENOUS ONCE
Refills: 0 | Status: COMPLETED | OUTPATIENT
Start: 2023-01-01 | End: 2023-01-01

## 2023-01-01 RX ORDER — POTASSIUM CHLORIDE 20 MEQ
10 PACKET (EA) ORAL ONCE
Refills: 0 | Status: COMPLETED | OUTPATIENT
Start: 2023-01-01 | End: 2023-01-01

## 2023-01-01 RX ORDER — CEFEPIME 1 G/1
1000 INJECTION, POWDER, FOR SOLUTION INTRAMUSCULAR; INTRAVENOUS EVERY 24 HOURS
Refills: 0 | Status: DISCONTINUED | OUTPATIENT
Start: 2023-01-01 | End: 2023-01-01

## 2023-01-01 RX ORDER — METOCLOPRAMIDE HCL 10 MG
10 TABLET ORAL EVERY 6 HOURS
Refills: 0 | Status: DISCONTINUED | OUTPATIENT
Start: 2023-01-01 | End: 2023-01-01

## 2023-01-01 RX ORDER — SUCRALFATE 1 G
1 TABLET ORAL
Refills: 0 | Status: DISCONTINUED | OUTPATIENT
Start: 2023-01-01 | End: 2023-01-01

## 2023-01-01 RX ORDER — ACETYLCYSTEINE 200 MG/ML
4 VIAL (ML) MISCELLANEOUS EVERY 8 HOURS
Refills: 0 | Status: ACTIVE | OUTPATIENT
Start: 2023-01-01 | End: 2024-05-18

## 2023-01-01 RX ORDER — PROPOFOL 10 MG/ML
15 INJECTION, EMULSION INTRAVENOUS
Qty: 1000 | Refills: 0 | Status: DISCONTINUED | OUTPATIENT
Start: 2023-01-01 | End: 2023-01-01

## 2023-01-01 RX ORDER — ALBUTEROL 90 UG/1
2 AEROSOL, METERED ORAL EVERY 6 HOURS
Refills: 0 | Status: DISCONTINUED | OUTPATIENT
Start: 2023-01-01 | End: 2023-01-01

## 2023-01-01 RX ORDER — MUPIROCIN 20 MG/G
1 OINTMENT TOPICAL
Refills: 0 | Status: COMPLETED | OUTPATIENT
Start: 2023-01-01 | End: 2023-01-01

## 2023-01-01 RX ORDER — MEROPENEM 1 G/30ML
500 INJECTION INTRAVENOUS ONCE
Refills: 0 | Status: DISCONTINUED | OUTPATIENT
Start: 2023-01-01 | End: 2023-01-01

## 2023-01-01 RX ORDER — HEPARIN SODIUM 5000 [USP'U]/ML
5000 INJECTION INTRAVENOUS; SUBCUTANEOUS EVERY 8 HOURS
Refills: 0 | Status: DISCONTINUED | OUTPATIENT
Start: 2023-01-01 | End: 2023-01-01

## 2023-01-01 RX ORDER — METOCLOPRAMIDE HCL 10 MG
10 TABLET ORAL ONCE
Refills: 0 | Status: COMPLETED | OUTPATIENT
Start: 2023-01-01 | End: 2023-01-01

## 2023-01-01 RX ORDER — METOCLOPRAMIDE HCL 10 MG
5 TABLET ORAL ONCE
Refills: 0 | Status: COMPLETED | OUTPATIENT
Start: 2023-01-01 | End: 2023-01-01

## 2023-01-01 RX ORDER — POLYETHYLENE GLYCOL 3350 17 G/17G
17 POWDER, FOR SOLUTION ORAL EVERY 12 HOURS
Refills: 0 | Status: DISCONTINUED | OUTPATIENT
Start: 2023-01-01 | End: 2023-01-01

## 2023-01-01 RX ORDER — LEVOTHYROXINE SODIUM 125 MCG
1 TABLET ORAL
Qty: 0 | Refills: 0 | DISCHARGE

## 2023-01-01 RX ORDER — SODIUM,POTASSIUM PHOSPHATES 278-250MG
1 POWDER IN PACKET (EA) ORAL ONCE
Refills: 0 | Status: DISCONTINUED | OUTPATIENT
Start: 2023-01-01 | End: 2023-01-01

## 2023-01-01 RX ORDER — ROBINUL 0.2 MG/ML
0.2 INJECTION INTRAMUSCULAR; INTRAVENOUS EVERY 8 HOURS
Refills: 0 | Status: DISCONTINUED | OUTPATIENT
Start: 2023-01-01 | End: 2023-01-01

## 2023-01-01 RX ORDER — FUROSEMIDE 40 MG
40 TABLET ORAL ONCE
Refills: 0 | Status: COMPLETED | OUTPATIENT
Start: 2023-01-01 | End: 2023-01-01

## 2023-01-01 RX ORDER — SENNA PLUS 8.6 MG/1
10 TABLET ORAL AT BEDTIME
Refills: 0 | Status: DISCONTINUED | OUTPATIENT
Start: 2023-01-01 | End: 2023-01-01

## 2023-01-01 RX ORDER — RISPERIDONE 4 MG/1
1 TABLET ORAL
Qty: 0 | Refills: 0 | DISCHARGE

## 2023-01-01 RX ORDER — PROPOFOL 10 MG/ML
20 INJECTION, EMULSION INTRAVENOUS
Qty: 500 | Refills: 0 | Status: DISCONTINUED | OUTPATIENT
Start: 2023-01-01 | End: 2023-01-01

## 2023-01-01 RX ORDER — HYDROMORPHONE HYDROCHLORIDE 2 MG/ML
1 INJECTION INTRAMUSCULAR; INTRAVENOUS; SUBCUTANEOUS
Refills: 0 | Status: DISCONTINUED | OUTPATIENT
Start: 2023-01-01 | End: 2023-01-01

## 2023-01-01 RX ORDER — NOREPINEPHRINE BITARTRATE/D5W 8 MG/250ML
0.05 PLASTIC BAG, INJECTION (ML) INTRAVENOUS
Qty: 16 | Refills: 0 | Status: DISCONTINUED | OUTPATIENT
Start: 2023-01-01 | End: 2023-01-01

## 2023-01-01 RX ORDER — ONDANSETRON 8 MG/1
4 TABLET, FILM COATED ORAL EVERY 8 HOURS
Refills: 0 | Status: DISCONTINUED | OUTPATIENT
Start: 2023-01-01 | End: 2023-01-01

## 2023-01-01 RX ORDER — SODIUM BICARBONATE 1 MEQ/ML
650 SYRINGE (ML) INTRAVENOUS THREE TIMES A DAY
Refills: 0 | Status: DISCONTINUED | OUTPATIENT
Start: 2023-01-01 | End: 2023-01-01

## 2023-01-01 RX ORDER — SODIUM CHLORIDE 9 MG/ML
500 INJECTION INTRAMUSCULAR; INTRAVENOUS; SUBCUTANEOUS ONCE
Refills: 0 | Status: COMPLETED | OUTPATIENT
Start: 2023-01-01 | End: 2023-01-01

## 2023-01-01 RX ORDER — ALBUTEROL 90 UG/1
2.5 AEROSOL, METERED ORAL EVERY 6 HOURS
Refills: 0 | Status: DISCONTINUED | OUTPATIENT
Start: 2023-01-01 | End: 2023-01-01

## 2023-01-01 RX ORDER — SODIUM CHLORIDE 9 MG/ML
3 INJECTION INTRAMUSCULAR; INTRAVENOUS; SUBCUTANEOUS EVERY 6 HOURS
Refills: 0 | Status: COMPLETED | OUTPATIENT
Start: 2023-01-01 | End: 2023-01-01

## 2023-01-01 RX ORDER — ONDANSETRON 8 MG/1
4 TABLET, FILM COATED ORAL EVERY 6 HOURS
Refills: 0 | Status: DISCONTINUED | OUTPATIENT
Start: 2023-01-01 | End: 2023-01-01

## 2023-01-01 RX ORDER — MIDAZOLAM HYDROCHLORIDE 1 MG/ML
1 INJECTION, SOLUTION INTRAMUSCULAR; INTRAVENOUS ONCE
Refills: 0 | Status: DISCONTINUED | OUTPATIENT
Start: 2023-01-01 | End: 2023-01-01

## 2023-01-01 RX ORDER — TIOTROPIUM BROMIDE 18 UG/1
2 CAPSULE ORAL; RESPIRATORY (INHALATION) DAILY
Refills: 0 | Status: DISCONTINUED | OUTPATIENT
Start: 2023-01-01 | End: 2023-01-01

## 2023-01-01 RX ORDER — LEVOTHYROXINE SODIUM 125 MCG
95 TABLET ORAL AT BEDTIME
Refills: 0 | Status: DISCONTINUED | OUTPATIENT
Start: 2023-01-01 | End: 2023-01-01

## 2023-01-01 RX ORDER — PANTOPRAZOLE SODIUM 20 MG/1
40 TABLET, DELAYED RELEASE ORAL EVERY 24 HOURS
Refills: 0 | Status: DISCONTINUED | OUTPATIENT
Start: 2023-01-01 | End: 2023-01-01

## 2023-01-01 RX ORDER — FERROUS SULFATE 325(65) MG
1 TABLET ORAL
Refills: 0 | DISCHARGE

## 2023-01-01 RX ORDER — SODIUM CHLORIDE 9 MG/ML
1000 INJECTION, SOLUTION INTRAVENOUS ONCE
Refills: 0 | Status: DISCONTINUED | OUTPATIENT
Start: 2023-01-01 | End: 2023-01-01

## 2023-01-01 RX ORDER — POTASSIUM PHOSPHATE, MONOBASIC POTASSIUM PHOSPHATE, DIBASIC 236; 224 MG/ML; MG/ML
15 INJECTION, SOLUTION INTRAVENOUS ONCE
Refills: 0 | Status: COMPLETED | OUTPATIENT
Start: 2023-01-01 | End: 2023-01-01

## 2023-01-01 RX ORDER — SENNA PLUS 8.6 MG/1
1 TABLET ORAL
Refills: 0 | DISCHARGE

## 2023-01-01 RX ORDER — NOREPINEPHRINE BITARTRATE/D5W 8 MG/250ML
0.55 PLASTIC BAG, INJECTION (ML) INTRAVENOUS
Qty: 16 | Refills: 0 | Status: DISCONTINUED | OUTPATIENT
Start: 2023-01-01 | End: 2023-01-01

## 2023-01-01 RX ORDER — SODIUM BICARBONATE 1 MEQ/ML
0.13 SYRINGE (ML) INTRAVENOUS
Qty: 150 | Refills: 0 | Status: DISCONTINUED | OUTPATIENT
Start: 2023-01-01 | End: 2023-01-01

## 2023-01-01 RX ORDER — NOREPINEPHRINE BITARTRATE/D5W 8 MG/250ML
0.05 PLASTIC BAG, INJECTION (ML) INTRAVENOUS
Qty: 8 | Refills: 0 | Status: DISCONTINUED | OUTPATIENT
Start: 2023-01-01 | End: 2023-01-01

## 2023-01-01 RX ORDER — CHOLECALCIFEROL (VITAMIN D3) 125 MCG
1 CAPSULE ORAL
Qty: 0 | Refills: 0 | DISCHARGE

## 2023-01-01 RX ORDER — LEVOTHYROXINE SODIUM 125 MCG
125 TABLET ORAL DAILY
Refills: 0 | Status: DISCONTINUED | OUTPATIENT
Start: 2023-01-01 | End: 2023-01-01

## 2023-01-01 RX ORDER — SODIUM CHLORIDE 9 MG/ML
10 INJECTION INTRAMUSCULAR; INTRAVENOUS; SUBCUTANEOUS
Refills: 0 | Status: DISCONTINUED | OUTPATIENT
Start: 2023-01-01 | End: 2023-01-01

## 2023-01-01 RX ORDER — BUPROPION HYDROCHLORIDE 150 MG/1
150 TABLET, EXTENDED RELEASE ORAL DAILY
Refills: 0 | Status: DISCONTINUED | OUTPATIENT
Start: 2023-01-01 | End: 2023-01-01

## 2023-01-01 RX ORDER — MEROPENEM 1 G/30ML
INJECTION INTRAVENOUS
Refills: 0 | Status: DISCONTINUED | OUTPATIENT
Start: 2023-01-01 | End: 2023-01-01

## 2023-01-01 RX ORDER — BRINZOLAMIDE/BRIMONIDINE TARTRATE 10; 2 MG/ML; MG/ML
1 SUSPENSION/ DROPS OPHTHALMIC
Qty: 0 | Refills: 0 | DISCHARGE

## 2023-01-01 RX ORDER — TRAZODONE HCL 50 MG
50 TABLET ORAL AT BEDTIME
Refills: 0 | Status: DISCONTINUED | OUTPATIENT
Start: 2023-01-01 | End: 2023-01-01

## 2023-01-01 RX ORDER — TRAZODONE HCL 50 MG
1 TABLET ORAL
Refills: 0 | DISCHARGE

## 2023-01-01 RX ORDER — FOLIC ACID 0.8 MG
1 TABLET ORAL
Qty: 0 | Refills: 0 | DISCHARGE

## 2023-01-01 RX ORDER — METOCLOPRAMIDE HCL 10 MG
10 TABLET ORAL EVERY 6 HOURS
Refills: 0 | Status: COMPLETED | OUTPATIENT
Start: 2023-01-01 | End: 2023-01-01

## 2023-01-01 RX ORDER — NYSTATIN CREAM 100000 [USP'U]/G
1 CREAM TOPICAL
Refills: 0 | Status: DISCONTINUED | OUTPATIENT
Start: 2023-01-01 | End: 2023-01-01

## 2023-01-01 RX ORDER — MAGNESIUM SULFATE 500 MG/ML
2 VIAL (ML) INJECTION ONCE
Refills: 0 | Status: COMPLETED | OUTPATIENT
Start: 2023-01-01 | End: 2023-01-01

## 2023-01-01 RX ORDER — VANCOMYCIN HCL 1 G
1500 VIAL (EA) INTRAVENOUS ONCE
Refills: 0 | Status: COMPLETED | OUTPATIENT
Start: 2023-01-01 | End: 2023-01-01

## 2023-01-01 RX ORDER — VANCOMYCIN HCL 1 G
1250 VIAL (EA) INTRAVENOUS
Refills: 0 | Status: DISCONTINUED | OUTPATIENT
Start: 2023-01-01 | End: 2023-01-01

## 2023-01-01 RX ORDER — MIDODRINE HYDROCHLORIDE 2.5 MG/1
5 TABLET ORAL EVERY 8 HOURS
Refills: 0 | Status: DISCONTINUED | OUTPATIENT
Start: 2023-01-01 | End: 2023-01-01

## 2023-01-01 RX ORDER — SALICYLIC ACID 0.5 %
1 CLEANSER (GRAM) TOPICAL AT BEDTIME
Refills: 0 | Status: DISCONTINUED | OUTPATIENT
Start: 2023-01-01 | End: 2023-01-01

## 2023-01-01 RX ORDER — PANTOPRAZOLE SODIUM 20 MG/1
80 TABLET, DELAYED RELEASE ORAL ONCE
Refills: 0 | Status: DISCONTINUED | OUTPATIENT
Start: 2023-01-01 | End: 2023-01-01

## 2023-01-01 RX ORDER — VANCOMYCIN HCL 1 G
1250 VIAL (EA) INTRAVENOUS ONCE
Refills: 0 | Status: DISCONTINUED | OUTPATIENT
Start: 2023-01-01 | End: 2023-01-01

## 2023-01-01 RX ORDER — POTASSIUM PHOSPHATE, MONOBASIC POTASSIUM PHOSPHATE, DIBASIC 236; 224 MG/ML; MG/ML
30 INJECTION, SOLUTION INTRAVENOUS ONCE
Refills: 0 | Status: DISCONTINUED | OUTPATIENT
Start: 2023-01-01 | End: 2023-01-01

## 2023-01-01 RX ORDER — LEVOTHYROXINE SODIUM 125 MCG
130 TABLET ORAL AT BEDTIME
Refills: 0 | Status: DISCONTINUED | OUTPATIENT
Start: 2023-01-01 | End: 2023-01-01

## 2023-01-01 RX ORDER — FINASTERIDE 5 MG/1
1 TABLET, FILM COATED ORAL
Qty: 0 | Refills: 0 | DISCHARGE

## 2023-01-01 RX ADMIN — ONDANSETRON 4 MILLIGRAM(S): 8 TABLET, FILM COATED ORAL at 20:38

## 2023-01-01 RX ADMIN — PANTOPRAZOLE SODIUM 40 MILLIGRAM(S): 20 TABLET, DELAYED RELEASE ORAL at 06:32

## 2023-01-01 RX ADMIN — CEFEPIME 100 MILLIGRAM(S): 1 INJECTION, POWDER, FOR SOLUTION INTRAMUSCULAR; INTRAVENOUS at 17:42

## 2023-01-01 RX ADMIN — Medication 40 MILLIGRAM(S): at 00:55

## 2023-01-01 RX ADMIN — ERYTHROPOIETIN 10000 UNIT(S): 10000 INJECTION, SOLUTION INTRAVENOUS; SUBCUTANEOUS at 09:49

## 2023-01-01 RX ADMIN — ALBUTEROL 2 PUFF(S): 90 AEROSOL, METERED ORAL at 03:36

## 2023-01-01 RX ADMIN — HYDROMORPHONE HYDROCHLORIDE 1 MILLIGRAM(S): 2 INJECTION INTRAMUSCULAR; INTRAVENOUS; SUBCUTANEOUS at 04:11

## 2023-01-01 RX ADMIN — Medication 50 MILLIEQUIVALENT(S): at 04:08

## 2023-01-01 RX ADMIN — ALBUTEROL 2 PUFF(S): 90 AEROSOL, METERED ORAL at 22:44

## 2023-01-01 RX ADMIN — Medication 40 MILLIEQUIVALENT(S): at 10:30

## 2023-01-01 RX ADMIN — HEPARIN SODIUM 5000 UNIT(S): 5000 INJECTION INTRAVENOUS; SUBCUTANEOUS at 06:12

## 2023-01-01 RX ADMIN — ALBUTEROL 2 PUFF(S): 90 AEROSOL, METERED ORAL at 14:23

## 2023-01-01 RX ADMIN — DIVALPROEX SODIUM 500 MILLIGRAM(S): 500 TABLET, DELAYED RELEASE ORAL at 05:57

## 2023-01-01 RX ADMIN — HEPARIN SODIUM 5000 UNIT(S): 5000 INJECTION INTRAVENOUS; SUBCUTANEOUS at 05:37

## 2023-01-01 RX ADMIN — Medication 300 MILLIGRAM(S): at 12:45

## 2023-01-01 RX ADMIN — ALBUTEROL 2 PUFF(S): 90 AEROSOL, METERED ORAL at 03:00

## 2023-01-01 RX ADMIN — CHLORHEXIDINE GLUCONATE 1 APPLICATION(S): 213 SOLUTION TOPICAL at 06:49

## 2023-01-01 RX ADMIN — Medication 100 GRAM(S): at 14:57

## 2023-01-01 RX ADMIN — HEPARIN SODIUM 5000 UNIT(S): 5000 INJECTION INTRAVENOUS; SUBCUTANEOUS at 05:40

## 2023-01-01 RX ADMIN — Medication 1 GRAM(S): at 23:09

## 2023-01-01 RX ADMIN — ROBINUL 0.2 MILLIGRAM(S): 0.2 INJECTION INTRAMUSCULAR; INTRAVENOUS at 21:58

## 2023-01-01 RX ADMIN — Medication 50 MILLIGRAM(S): at 23:23

## 2023-01-01 RX ADMIN — Medication 125 MICROGRAM(S): at 05:27

## 2023-01-01 RX ADMIN — Medication 1 GRAM(S): at 00:29

## 2023-01-01 RX ADMIN — Medication 1 APPLICATION(S): at 21:09

## 2023-01-01 RX ADMIN — DORZOLAMIDE HYDROCHLORIDE 1 DROP(S): 20 SOLUTION/ DROPS OPHTHALMIC at 21:51

## 2023-01-01 RX ADMIN — DORZOLAMIDE HYDROCHLORIDE 1 DROP(S): 20 SOLUTION/ DROPS OPHTHALMIC at 22:45

## 2023-01-01 RX ADMIN — BUPROPION HYDROCHLORIDE 150 MILLIGRAM(S): 150 TABLET, EXTENDED RELEASE ORAL at 11:29

## 2023-01-01 RX ADMIN — NYSTATIN CREAM 1 APPLICATION(S): 100000 CREAM TOPICAL at 17:35

## 2023-01-01 RX ADMIN — Medication 50 MILLIGRAM(S): at 22:15

## 2023-01-01 RX ADMIN — PANTOPRAZOLE SODIUM 40 MILLIGRAM(S): 20 TABLET, DELAYED RELEASE ORAL at 11:03

## 2023-01-01 RX ADMIN — Medication 1 GRAM(S): at 05:21

## 2023-01-01 RX ADMIN — Medication 650 MILLIGRAM(S): at 05:32

## 2023-01-01 RX ADMIN — Medication 50 MILLIGRAM(S): at 21:24

## 2023-01-01 RX ADMIN — POTASSIUM PHOSPHATE, MONOBASIC POTASSIUM PHOSPHATE, DIBASIC 62.5 MILLIMOLE(S): 236; 224 INJECTION, SOLUTION INTRAVENOUS at 08:45

## 2023-01-01 RX ADMIN — Medication 1 GRAM(S): at 05:38

## 2023-01-01 RX ADMIN — DIVALPROEX SODIUM 500 MILLIGRAM(S): 500 TABLET, DELAYED RELEASE ORAL at 05:09

## 2023-01-01 RX ADMIN — PROPOFOL 7.78 MICROGRAM(S)/KG/MIN: 10 INJECTION, EMULSION INTRAVENOUS at 23:53

## 2023-01-01 RX ADMIN — DORZOLAMIDE HYDROCHLORIDE 1 DROP(S): 20 SOLUTION/ DROPS OPHTHALMIC at 08:54

## 2023-01-01 RX ADMIN — HEPARIN SODIUM 5000 UNIT(S): 5000 INJECTION INTRAVENOUS; SUBCUTANEOUS at 14:55

## 2023-01-01 RX ADMIN — PROPOFOL 7.78 MICROGRAM(S)/KG/MIN: 10 INJECTION, EMULSION INTRAVENOUS at 04:55

## 2023-01-01 RX ADMIN — CHLORHEXIDINE GLUCONATE 1 APPLICATION(S): 213 SOLUTION TOPICAL at 06:31

## 2023-01-01 RX ADMIN — FINASTERIDE 5 MILLIGRAM(S): 5 TABLET, FILM COATED ORAL at 13:18

## 2023-01-01 RX ADMIN — HEPARIN SODIUM 5000 UNIT(S): 5000 INJECTION INTRAVENOUS; SUBCUTANEOUS at 05:55

## 2023-01-01 RX ADMIN — Medication 1 GRAM(S): at 11:19

## 2023-01-01 RX ADMIN — Medication 4 MILLILITER(S): at 03:43

## 2023-01-01 RX ADMIN — HEPARIN SODIUM 5000 UNIT(S): 5000 INJECTION INTRAVENOUS; SUBCUTANEOUS at 22:09

## 2023-01-01 RX ADMIN — Medication 44.6 MICROGRAM(S)/KG/MIN: at 09:43

## 2023-01-01 RX ADMIN — Medication 125 MICROGRAM(S): at 07:14

## 2023-01-01 RX ADMIN — Medication 1 GRAM(S): at 00:11

## 2023-01-01 RX ADMIN — CEFEPIME 100 MILLIGRAM(S): 1 INJECTION, POWDER, FOR SOLUTION INTRAMUSCULAR; INTRAVENOUS at 05:39

## 2023-01-01 RX ADMIN — Medication 100 MILLIEQUIVALENT(S): at 06:18

## 2023-01-01 RX ADMIN — FENTANYL CITRATE 50 MICROGRAM(S): 50 INJECTION INTRAVENOUS at 05:18

## 2023-01-01 RX ADMIN — Medication 10 MILLIGRAM(S): at 22:34

## 2023-01-01 RX ADMIN — Medication 1300 MILLIGRAM(S): at 13:16

## 2023-01-01 RX ADMIN — PANTOPRAZOLE SODIUM 40 MILLIGRAM(S): 20 TABLET, DELAYED RELEASE ORAL at 11:19

## 2023-01-01 RX ADMIN — SODIUM CHLORIDE 1000 MILLILITER(S): 9 INJECTION INTRAMUSCULAR; INTRAVENOUS; SUBCUTANEOUS at 20:38

## 2023-01-01 RX ADMIN — PANTOPRAZOLE SODIUM 40 MILLIGRAM(S): 20 TABLET, DELAYED RELEASE ORAL at 06:11

## 2023-01-01 RX ADMIN — Medication 1 GRAM(S): at 05:45

## 2023-01-01 RX ADMIN — CEFEPIME 100 MILLIGRAM(S): 1 INJECTION, POWDER, FOR SOLUTION INTRAMUSCULAR; INTRAVENOUS at 17:30

## 2023-01-01 RX ADMIN — NYSTATIN CREAM 1 APPLICATION(S): 100000 CREAM TOPICAL at 17:06

## 2023-01-01 RX ADMIN — MIDAZOLAM HYDROCHLORIDE 1 MILLIGRAM(S): 1 INJECTION, SOLUTION INTRAMUSCULAR; INTRAVENOUS at 22:32

## 2023-01-01 RX ADMIN — Medication 50 MILLILITER(S): at 06:22

## 2023-01-01 RX ADMIN — ALBUTEROL 2 PUFF(S): 90 AEROSOL, METERED ORAL at 21:51

## 2023-01-01 RX ADMIN — Medication 650 MILLIGRAM(S): at 14:55

## 2023-01-01 RX ADMIN — Medication 4 MILLILITER(S): at 09:36

## 2023-01-01 RX ADMIN — ONDANSETRON 4 MILLIGRAM(S): 8 TABLET, FILM COATED ORAL at 05:42

## 2023-01-01 RX ADMIN — Medication 50 MILLILITER(S): at 14:16

## 2023-01-01 RX ADMIN — Medication 100 MILLIEQUIVALENT(S): at 00:50

## 2023-01-01 RX ADMIN — Medication 1300 MILLIGRAM(S): at 15:24

## 2023-01-01 RX ADMIN — Medication 10 MILLIGRAM(S): at 04:14

## 2023-01-01 RX ADMIN — Medication 1 GRAM(S): at 05:39

## 2023-01-01 RX ADMIN — HEPARIN SODIUM 5000 UNIT(S): 5000 INJECTION INTRAVENOUS; SUBCUTANEOUS at 22:43

## 2023-01-01 RX ADMIN — PANTOPRAZOLE SODIUM 40 MILLIGRAM(S): 20 TABLET, DELAYED RELEASE ORAL at 16:10

## 2023-01-01 RX ADMIN — Medication 1 GRAM(S): at 05:10

## 2023-01-01 RX ADMIN — Medication 250 MILLIGRAM(S): at 08:22

## 2023-01-01 RX ADMIN — ALBUTEROL 2 PUFF(S): 90 AEROSOL, METERED ORAL at 04:15

## 2023-01-01 RX ADMIN — ONDANSETRON 4 MILLIGRAM(S): 8 TABLET, FILM COATED ORAL at 04:45

## 2023-01-01 RX ADMIN — HEPARIN SODIUM 5000 UNIT(S): 5000 INJECTION INTRAVENOUS; SUBCUTANEOUS at 06:30

## 2023-01-01 RX ADMIN — BUPROPION HYDROCHLORIDE 150 MILLIGRAM(S): 150 TABLET, EXTENDED RELEASE ORAL at 12:33

## 2023-01-01 RX ADMIN — HEPARIN SODIUM 5000 UNIT(S): 5000 INJECTION INTRAVENOUS; SUBCUTANEOUS at 13:16

## 2023-01-01 RX ADMIN — ALBUTEROL 2 PUFF(S): 90 AEROSOL, METERED ORAL at 22:49

## 2023-01-01 RX ADMIN — Medication 3.95 MICROGRAM(S)/KG/MIN: at 00:51

## 2023-01-01 RX ADMIN — ALBUTEROL 2 PUFF(S): 90 AEROSOL, METERED ORAL at 08:18

## 2023-01-01 RX ADMIN — Medication 650 MILLIGRAM(S): at 22:26

## 2023-01-01 RX ADMIN — SODIUM CHLORIDE 2000 MILLILITER(S): 9 INJECTION, SOLUTION INTRAVENOUS at 21:49

## 2023-01-01 RX ADMIN — ALBUTEROL 2 PUFF(S): 90 AEROSOL, METERED ORAL at 03:02

## 2023-01-01 RX ADMIN — FINASTERIDE 5 MILLIGRAM(S): 5 TABLET, FILM COATED ORAL at 12:12

## 2023-01-01 RX ADMIN — Medication 1 GRAM(S): at 17:15

## 2023-01-01 RX ADMIN — Medication 650 MILLIGRAM(S): at 06:30

## 2023-01-01 RX ADMIN — Medication 1 GRAM(S): at 12:13

## 2023-01-01 RX ADMIN — TAMSULOSIN HYDROCHLORIDE 0.4 MILLIGRAM(S): 0.4 CAPSULE ORAL at 21:20

## 2023-01-01 RX ADMIN — Medication 1 GRAM(S): at 17:58

## 2023-01-01 RX ADMIN — PANTOPRAZOLE SODIUM 40 MILLIGRAM(S): 20 TABLET, DELAYED RELEASE ORAL at 06:12

## 2023-01-01 RX ADMIN — FINASTERIDE 5 MILLIGRAM(S): 5 TABLET, FILM COATED ORAL at 12:25

## 2023-01-01 RX ADMIN — Medication 1 GRAM(S): at 01:00

## 2023-01-01 RX ADMIN — DIVALPROEX SODIUM 500 MILLIGRAM(S): 500 TABLET, DELAYED RELEASE ORAL at 05:26

## 2023-01-01 RX ADMIN — ALBUTEROL 2 PUFF(S): 90 AEROSOL, METERED ORAL at 22:37

## 2023-01-01 RX ADMIN — HEPARIN SODIUM 5000 UNIT(S): 5000 INJECTION INTRAVENOUS; SUBCUTANEOUS at 13:18

## 2023-01-01 RX ADMIN — Medication 1 GRAM(S): at 00:10

## 2023-01-01 RX ADMIN — Medication 1 GRAM(S): at 17:30

## 2023-01-01 RX ADMIN — SODIUM CHLORIDE 75 MILLILITER(S): 9 INJECTION, SOLUTION INTRAVENOUS at 08:16

## 2023-01-01 RX ADMIN — Medication 1 APPLICATION(S): at 23:02

## 2023-01-01 RX ADMIN — PANTOPRAZOLE SODIUM 40 MILLIGRAM(S): 20 TABLET, DELAYED RELEASE ORAL at 17:47

## 2023-01-01 RX ADMIN — ALBUTEROL 2 PUFF(S): 90 AEROSOL, METERED ORAL at 15:34

## 2023-01-01 RX ADMIN — CHLORHEXIDINE GLUCONATE 1 APPLICATION(S): 213 SOLUTION TOPICAL at 06:38

## 2023-01-01 RX ADMIN — CHLORHEXIDINE GLUCONATE 1 APPLICATION(S): 213 SOLUTION TOPICAL at 05:10

## 2023-01-01 RX ADMIN — BUPROPION HYDROCHLORIDE 150 MILLIGRAM(S): 150 TABLET, EXTENDED RELEASE ORAL at 12:26

## 2023-01-01 RX ADMIN — Medication 1 APPLICATION(S): at 22:48

## 2023-01-01 RX ADMIN — DIVALPROEX SODIUM 500 MILLIGRAM(S): 500 TABLET, DELAYED RELEASE ORAL at 17:47

## 2023-01-01 RX ADMIN — BUPROPION HYDROCHLORIDE 150 MILLIGRAM(S): 150 TABLET, EXTENDED RELEASE ORAL at 12:22

## 2023-01-01 RX ADMIN — Medication 10 MILLIGRAM(S): at 10:53

## 2023-01-01 RX ADMIN — Medication 650 MILLIGRAM(S): at 22:09

## 2023-01-01 RX ADMIN — DORZOLAMIDE HYDROCHLORIDE 1 DROP(S): 20 SOLUTION/ DROPS OPHTHALMIC at 21:24

## 2023-01-01 RX ADMIN — CHLORHEXIDINE GLUCONATE 1 APPLICATION(S): 213 SOLUTION TOPICAL at 06:23

## 2023-01-01 RX ADMIN — ROBINUL 0.2 MILLIGRAM(S): 0.2 INJECTION INTRAMUSCULAR; INTRAVENOUS at 14:34

## 2023-01-01 RX ADMIN — Medication 50 MILLILITER(S): at 06:00

## 2023-01-01 RX ADMIN — DIVALPROEX SODIUM 500 MILLIGRAM(S): 500 TABLET, DELAYED RELEASE ORAL at 18:31

## 2023-01-01 RX ADMIN — TAMSULOSIN HYDROCHLORIDE 0.4 MILLIGRAM(S): 0.4 CAPSULE ORAL at 21:49

## 2023-01-01 RX ADMIN — ALBUTEROL 2 PUFF(S): 90 AEROSOL, METERED ORAL at 03:54

## 2023-01-01 RX ADMIN — Medication 1 GRAM(S): at 06:29

## 2023-01-01 RX ADMIN — Medication 55 MILLIGRAM(S): at 17:41

## 2023-01-01 RX ADMIN — ALBUTEROL 2.5 MILLIGRAM(S): 90 AEROSOL, METERED ORAL at 09:35

## 2023-01-01 RX ADMIN — ONDANSETRON 4 MILLIGRAM(S): 8 TABLET, FILM COATED ORAL at 21:20

## 2023-01-01 RX ADMIN — Medication 650 MILLIGRAM(S): at 13:51

## 2023-01-01 RX ADMIN — PANTOPRAZOLE SODIUM 40 MILLIGRAM(S): 20 TABLET, DELAYED RELEASE ORAL at 11:36

## 2023-01-01 RX ADMIN — Medication 50 MILLILITER(S): at 06:47

## 2023-01-01 RX ADMIN — BUPROPION HYDROCHLORIDE 150 MILLIGRAM(S): 150 TABLET, EXTENDED RELEASE ORAL at 13:15

## 2023-01-01 RX ADMIN — HEPARIN SODIUM 5000 UNIT(S): 5000 INJECTION INTRAVENOUS; SUBCUTANEOUS at 21:00

## 2023-01-01 RX ADMIN — Medication 1 GRAM(S): at 17:37

## 2023-01-01 RX ADMIN — Medication 1 PACKET(S): at 12:33

## 2023-01-01 RX ADMIN — PANTOPRAZOLE SODIUM 40 MILLIGRAM(S): 20 TABLET, DELAYED RELEASE ORAL at 18:48

## 2023-01-01 RX ADMIN — ALBUTEROL 2 PUFF(S): 90 AEROSOL, METERED ORAL at 11:27

## 2023-01-01 RX ADMIN — ALBUTEROL 2 PUFF(S): 90 AEROSOL, METERED ORAL at 21:43

## 2023-01-01 RX ADMIN — CEFEPIME 100 MILLIGRAM(S): 1 INJECTION, POWDER, FOR SOLUTION INTRAMUSCULAR; INTRAVENOUS at 06:30

## 2023-01-01 RX ADMIN — FINASTERIDE 5 MILLIGRAM(S): 5 TABLET, FILM COATED ORAL at 13:49

## 2023-01-01 RX ADMIN — ALBUTEROL 2 PUFF(S): 90 AEROSOL, METERED ORAL at 22:11

## 2023-01-01 RX ADMIN — ROBINUL 0.2 MILLIGRAM(S): 0.2 INJECTION INTRAMUSCULAR; INTRAVENOUS at 11:28

## 2023-01-01 RX ADMIN — ALBUTEROL 2 PUFF(S): 90 AEROSOL, METERED ORAL at 04:30

## 2023-01-01 RX ADMIN — ONDANSETRON 4 MILLIGRAM(S): 8 TABLET, FILM COATED ORAL at 00:03

## 2023-01-01 RX ADMIN — DORZOLAMIDE HYDROCHLORIDE 1 DROP(S): 20 SOLUTION/ DROPS OPHTHALMIC at 22:23

## 2023-01-01 RX ADMIN — DIVALPROEX SODIUM 500 MILLIGRAM(S): 500 TABLET, DELAYED RELEASE ORAL at 17:24

## 2023-01-01 RX ADMIN — ONDANSETRON 4 MILLIGRAM(S): 8 TABLET, FILM COATED ORAL at 14:31

## 2023-01-01 RX ADMIN — Medication 1 APPLICATION(S): at 21:37

## 2023-01-01 RX ADMIN — ALBUTEROL 2.5 MILLIGRAM(S): 90 AEROSOL, METERED ORAL at 09:21

## 2023-01-01 RX ADMIN — Medication 5 MILLIGRAM(S): at 04:02

## 2023-01-01 RX ADMIN — DIVALPROEX SODIUM 500 MILLIGRAM(S): 500 TABLET, DELAYED RELEASE ORAL at 17:11

## 2023-01-01 RX ADMIN — FINASTERIDE 5 MILLIGRAM(S): 5 TABLET, FILM COATED ORAL at 12:06

## 2023-01-01 RX ADMIN — DIVALPROEX SODIUM 500 MILLIGRAM(S): 500 TABLET, DELAYED RELEASE ORAL at 17:02

## 2023-01-01 RX ADMIN — MUPIROCIN 1 APPLICATION(S): 20 OINTMENT TOPICAL at 06:29

## 2023-01-01 RX ADMIN — DORZOLAMIDE HYDROCHLORIDE 1 DROP(S): 20 SOLUTION/ DROPS OPHTHALMIC at 21:29

## 2023-01-01 RX ADMIN — Medication 4 MILLILITER(S): at 20:13

## 2023-01-01 RX ADMIN — Medication 50 MILLIGRAM(S): at 21:20

## 2023-01-01 RX ADMIN — Medication 650 MILLIGRAM(S): at 06:32

## 2023-01-01 RX ADMIN — ALBUTEROL 2 PUFF(S): 90 AEROSOL, METERED ORAL at 21:01

## 2023-01-01 RX ADMIN — Medication 1300 MILLIGRAM(S): at 05:56

## 2023-01-01 RX ADMIN — DORZOLAMIDE HYDROCHLORIDE 1 DROP(S): 20 SOLUTION/ DROPS OPHTHALMIC at 21:46

## 2023-01-01 RX ADMIN — Medication 10 MILLIGRAM(S): at 17:03

## 2023-01-01 RX ADMIN — Medication 100 GRAM(S): at 05:52

## 2023-01-01 RX ADMIN — CEFEPIME 100 MILLIGRAM(S): 1 INJECTION, POWDER, FOR SOLUTION INTRAMUSCULAR; INTRAVENOUS at 17:49

## 2023-01-01 RX ADMIN — Medication 25 GRAM(S): at 09:13

## 2023-01-01 RX ADMIN — SODIUM CHLORIDE 50 MILLILITER(S): 9 INJECTION, SOLUTION INTRAVENOUS at 17:12

## 2023-01-01 RX ADMIN — ONDANSETRON 4 MILLIGRAM(S): 8 TABLET, FILM COATED ORAL at 02:42

## 2023-01-01 RX ADMIN — DORZOLAMIDE HYDROCHLORIDE 1 DROP(S): 20 SOLUTION/ DROPS OPHTHALMIC at 22:16

## 2023-01-01 RX ADMIN — ROBINUL 0.2 MILLIGRAM(S): 0.2 INJECTION INTRAMUSCULAR; INTRAVENOUS at 05:34

## 2023-01-01 RX ADMIN — HEPARIN SODIUM 5000 UNIT(S): 5000 INJECTION INTRAVENOUS; SUBCUTANEOUS at 21:44

## 2023-01-01 RX ADMIN — MUPIROCIN 1 APPLICATION(S): 20 OINTMENT TOPICAL at 05:31

## 2023-01-01 RX ADMIN — PANTOPRAZOLE SODIUM 40 MILLIGRAM(S): 20 TABLET, DELAYED RELEASE ORAL at 17:02

## 2023-01-01 RX ADMIN — Medication 250 MILLIGRAM(S): at 01:47

## 2023-01-01 RX ADMIN — Medication 20 MILLIEQUIVALENT(S): at 09:08

## 2023-01-01 RX ADMIN — FINASTERIDE 5 MILLIGRAM(S): 5 TABLET, FILM COATED ORAL at 12:29

## 2023-01-01 RX ADMIN — Medication 1 APPLICATION(S): at 22:27

## 2023-01-01 RX ADMIN — ALBUTEROL 2 PUFF(S): 90 AEROSOL, METERED ORAL at 08:35

## 2023-01-01 RX ADMIN — PANTOPRAZOLE SODIUM 40 MILLIGRAM(S): 20 TABLET, DELAYED RELEASE ORAL at 17:30

## 2023-01-01 RX ADMIN — Medication 50 MILLIGRAM(S): at 22:09

## 2023-01-01 RX ADMIN — ALBUTEROL 2 PUFF(S): 90 AEROSOL, METERED ORAL at 10:46

## 2023-01-01 RX ADMIN — NYSTATIN CREAM 1 APPLICATION(S): 100000 CREAM TOPICAL at 05:46

## 2023-01-01 RX ADMIN — ALBUTEROL 2 PUFF(S): 90 AEROSOL, METERED ORAL at 17:12

## 2023-01-01 RX ADMIN — Medication 50 MILLIGRAM(S): at 22:08

## 2023-01-01 RX ADMIN — Medication 10 MILLIGRAM(S): at 12:25

## 2023-01-01 RX ADMIN — POTASSIUM PHOSPHATE, MONOBASIC POTASSIUM PHOSPHATE, DIBASIC 62.5 MILLIMOLE(S): 236; 224 INJECTION, SOLUTION INTRAVENOUS at 11:10

## 2023-01-01 RX ADMIN — NYSTATIN CREAM 1 APPLICATION(S): 100000 CREAM TOPICAL at 18:00

## 2023-01-01 RX ADMIN — ALBUTEROL 2.5 MILLIGRAM(S): 90 AEROSOL, METERED ORAL at 19:21

## 2023-01-01 RX ADMIN — CHLORHEXIDINE GLUCONATE 1 APPLICATION(S): 213 SOLUTION TOPICAL at 05:28

## 2023-01-01 RX ADMIN — Medication 1000 MILLIGRAM(S): at 19:03

## 2023-01-01 RX ADMIN — POTASSIUM PHOSPHATE, MONOBASIC POTASSIUM PHOSPHATE, DIBASIC 62.5 MILLIMOLE(S): 236; 224 INJECTION, SOLUTION INTRAVENOUS at 06:53

## 2023-01-01 RX ADMIN — Medication 40 MILLIEQUIVALENT(S): at 08:57

## 2023-01-01 RX ADMIN — Medication 1 GRAM(S): at 14:14

## 2023-01-01 RX ADMIN — FINASTERIDE 5 MILLIGRAM(S): 5 TABLET, FILM COATED ORAL at 12:13

## 2023-01-01 RX ADMIN — CHLORHEXIDINE GLUCONATE 1 APPLICATION(S): 213 SOLUTION TOPICAL at 05:54

## 2023-01-01 RX ADMIN — PANTOPRAZOLE SODIUM 40 MILLIGRAM(S): 20 TABLET, DELAYED RELEASE ORAL at 18:01

## 2023-01-01 RX ADMIN — Medication 55 MILLIGRAM(S): at 05:36

## 2023-01-01 RX ADMIN — Medication 100 MILLIEQUIVALENT(S): at 10:53

## 2023-01-01 RX ADMIN — Medication 1000 MILLIGRAM(S): at 01:45

## 2023-01-01 RX ADMIN — CHLORHEXIDINE GLUCONATE 1 APPLICATION(S): 213 SOLUTION TOPICAL at 05:29

## 2023-01-01 RX ADMIN — Medication 100 MILLIEQUIVALENT(S): at 11:57

## 2023-01-01 RX ADMIN — DIVALPROEX SODIUM 500 MILLIGRAM(S): 500 TABLET, DELAYED RELEASE ORAL at 05:47

## 2023-01-01 RX ADMIN — Medication 1 GRAM(S): at 00:19

## 2023-01-01 RX ADMIN — Medication 5 MILLIGRAM(S): at 09:00

## 2023-01-01 RX ADMIN — Medication 10 MILLIGRAM(S): at 11:40

## 2023-01-01 RX ADMIN — CHLORHEXIDINE GLUCONATE 1 APPLICATION(S): 213 SOLUTION TOPICAL at 06:29

## 2023-01-01 RX ADMIN — ONDANSETRON 4 MILLIGRAM(S): 8 TABLET, FILM COATED ORAL at 22:36

## 2023-01-01 RX ADMIN — Medication 650 MILLIGRAM(S): at 21:47

## 2023-01-01 RX ADMIN — Medication 1 APPLICATION(S): at 21:52

## 2023-01-01 RX ADMIN — CEFEPIME 100 MILLIGRAM(S): 1 INJECTION, POWDER, FOR SOLUTION INTRAMUSCULAR; INTRAVENOUS at 06:21

## 2023-01-01 RX ADMIN — Medication 50 MILLIGRAM(S): at 21:34

## 2023-01-01 RX ADMIN — DORZOLAMIDE HYDROCHLORIDE 1 DROP(S): 20 SOLUTION/ DROPS OPHTHALMIC at 09:09

## 2023-01-01 RX ADMIN — Medication 50 MILLIEQUIVALENT(S): at 10:35

## 2023-01-01 RX ADMIN — Medication 55 MILLIGRAM(S): at 05:52

## 2023-01-01 RX ADMIN — Medication 50 MILLILITER(S): at 05:33

## 2023-01-01 RX ADMIN — Medication 50 MILLIGRAM(S): at 21:50

## 2023-01-01 RX ADMIN — Medication 137 MICROGRAM(S): at 06:12

## 2023-01-01 RX ADMIN — Medication 10 MILLIGRAM(S): at 00:17

## 2023-01-01 RX ADMIN — TAMSULOSIN HYDROCHLORIDE 0.4 MILLIGRAM(S): 0.4 CAPSULE ORAL at 22:21

## 2023-01-01 RX ADMIN — TAMSULOSIN HYDROCHLORIDE 0.4 MILLIGRAM(S): 0.4 CAPSULE ORAL at 21:58

## 2023-01-01 RX ADMIN — SODIUM CHLORIDE 2000 MILLILITER(S): 9 INJECTION, SOLUTION INTRAVENOUS at 08:54

## 2023-01-01 RX ADMIN — TAMSULOSIN HYDROCHLORIDE 0.4 MILLIGRAM(S): 0.4 CAPSULE ORAL at 22:08

## 2023-01-01 RX ADMIN — ALBUTEROL 2 PUFF(S): 90 AEROSOL, METERED ORAL at 12:14

## 2023-01-01 RX ADMIN — PANTOPRAZOLE SODIUM 40 MILLIGRAM(S): 20 TABLET, DELAYED RELEASE ORAL at 12:22

## 2023-01-01 RX ADMIN — CHLORHEXIDINE GLUCONATE 1 APPLICATION(S): 213 SOLUTION TOPICAL at 05:56

## 2023-01-01 RX ADMIN — SODIUM CHLORIDE 50 MILLILITER(S): 9 INJECTION, SOLUTION INTRAVENOUS at 05:48

## 2023-01-01 RX ADMIN — Medication 1 TABLET(S): at 15:12

## 2023-01-01 RX ADMIN — Medication 137 MICROGRAM(S): at 05:55

## 2023-01-01 RX ADMIN — Medication 75 MEQ/KG/HR: at 20:20

## 2023-01-01 RX ADMIN — DORZOLAMIDE HYDROCHLORIDE 1 DROP(S): 20 SOLUTION/ DROPS OPHTHALMIC at 22:13

## 2023-01-01 RX ADMIN — DORZOLAMIDE HYDROCHLORIDE 1 DROP(S): 20 SOLUTION/ DROPS OPHTHALMIC at 21:17

## 2023-01-01 RX ADMIN — DIVALPROEX SODIUM 500 MILLIGRAM(S): 500 TABLET, DELAYED RELEASE ORAL at 17:42

## 2023-01-01 RX ADMIN — SODIUM CHLORIDE 3 MILLILITER(S): 9 INJECTION INTRAMUSCULAR; INTRAVENOUS; SUBCUTANEOUS at 20:17

## 2023-01-01 RX ADMIN — NYSTATIN CREAM 1 APPLICATION(S): 100000 CREAM TOPICAL at 17:26

## 2023-01-01 RX ADMIN — ALBUTEROL 2 PUFF(S): 90 AEROSOL, METERED ORAL at 04:14

## 2023-01-01 RX ADMIN — Medication 1 GRAM(S): at 11:40

## 2023-01-01 RX ADMIN — HEPARIN SODIUM 5000 UNIT(S): 5000 INJECTION INTRAVENOUS; SUBCUTANEOUS at 22:07

## 2023-01-01 RX ADMIN — TAMSULOSIN HYDROCHLORIDE 0.4 MILLIGRAM(S): 0.4 CAPSULE ORAL at 22:27

## 2023-01-01 RX ADMIN — FINASTERIDE 5 MILLIGRAM(S): 5 TABLET, FILM COATED ORAL at 11:28

## 2023-01-01 RX ADMIN — CEFEPIME 100 MILLIGRAM(S): 1 INJECTION, POWDER, FOR SOLUTION INTRAMUSCULAR; INTRAVENOUS at 06:49

## 2023-01-01 RX ADMIN — SODIUM CHLORIDE 500 MILLILITER(S): 9 INJECTION INTRAMUSCULAR; INTRAVENOUS; SUBCUTANEOUS at 14:00

## 2023-01-01 RX ADMIN — Medication 650 MILLIGRAM(S): at 14:16

## 2023-01-01 RX ADMIN — MUPIROCIN 1 APPLICATION(S): 20 OINTMENT TOPICAL at 18:00

## 2023-01-01 RX ADMIN — SODIUM CHLORIDE 100 MILLILITER(S): 9 INJECTION, SOLUTION INTRAVENOUS at 00:05

## 2023-01-01 RX ADMIN — ALBUTEROL 2 PUFF(S): 90 AEROSOL, METERED ORAL at 17:05

## 2023-01-01 RX ADMIN — Medication 100 MILLIEQUIVALENT(S): at 08:05

## 2023-01-01 RX ADMIN — DORZOLAMIDE HYDROCHLORIDE 1 DROP(S): 20 SOLUTION/ DROPS OPHTHALMIC at 22:37

## 2023-01-01 RX ADMIN — Medication 1 GRAM(S): at 19:01

## 2023-01-01 RX ADMIN — BUPROPION HYDROCHLORIDE 150 MILLIGRAM(S): 150 TABLET, EXTENDED RELEASE ORAL at 11:36

## 2023-01-01 RX ADMIN — Medication 1 GRAM(S): at 17:35

## 2023-01-01 RX ADMIN — Medication 5 MILLIGRAM(S): at 21:41

## 2023-01-01 RX ADMIN — ALBUTEROL 2 PUFF(S): 90 AEROSOL, METERED ORAL at 08:55

## 2023-01-01 RX ADMIN — ALBUTEROL 2 PUFF(S): 90 AEROSOL, METERED ORAL at 06:37

## 2023-01-01 RX ADMIN — Medication 50 MILLIGRAM(S): at 21:44

## 2023-01-01 RX ADMIN — CEFEPIME 100 MILLIGRAM(S): 1 INJECTION, POWDER, FOR SOLUTION INTRAMUSCULAR; INTRAVENOUS at 05:28

## 2023-01-01 RX ADMIN — Medication 1300 MILLIGRAM(S): at 05:30

## 2023-01-01 RX ADMIN — HEPARIN SODIUM 5000 UNIT(S): 5000 INJECTION INTRAVENOUS; SUBCUTANEOUS at 21:46

## 2023-01-01 RX ADMIN — Medication 125 MICROGRAM(S): at 05:32

## 2023-01-01 RX ADMIN — Medication 1300 MILLIGRAM(S): at 15:32

## 2023-01-01 RX ADMIN — HEPARIN SODIUM 5000 UNIT(S): 5000 INJECTION INTRAVENOUS; SUBCUTANEOUS at 13:32

## 2023-01-01 RX ADMIN — DORZOLAMIDE HYDROCHLORIDE 1 DROP(S): 20 SOLUTION/ DROPS OPHTHALMIC at 22:44

## 2023-01-01 RX ADMIN — PANTOPRAZOLE SODIUM 40 MILLIGRAM(S): 20 TABLET, DELAYED RELEASE ORAL at 12:30

## 2023-01-01 RX ADMIN — MUPIROCIN 1 APPLICATION(S): 20 OINTMENT TOPICAL at 05:52

## 2023-01-01 RX ADMIN — Medication 100 MILLIEQUIVALENT(S): at 04:41

## 2023-01-01 RX ADMIN — Medication 10 MILLIGRAM(S): at 17:35

## 2023-01-01 RX ADMIN — DORZOLAMIDE HYDROCHLORIDE 1 DROP(S): 20 SOLUTION/ DROPS OPHTHALMIC at 10:13

## 2023-01-01 RX ADMIN — ALBUTEROL 2.5 MILLIGRAM(S): 90 AEROSOL, METERED ORAL at 03:42

## 2023-01-01 RX ADMIN — Medication 100 GRAM(S): at 06:21

## 2023-01-01 RX ADMIN — ALBUTEROL 2.5 MILLIGRAM(S): 90 AEROSOL, METERED ORAL at 15:52

## 2023-01-01 RX ADMIN — TAMSULOSIN HYDROCHLORIDE 0.4 MILLIGRAM(S): 0.4 CAPSULE ORAL at 21:28

## 2023-01-01 RX ADMIN — Medication 650 MILLIGRAM(S): at 22:21

## 2023-01-01 RX ADMIN — BUPROPION HYDROCHLORIDE 150 MILLIGRAM(S): 150 TABLET, EXTENDED RELEASE ORAL at 12:29

## 2023-01-01 RX ADMIN — DORZOLAMIDE HYDROCHLORIDE 1 DROP(S): 20 SOLUTION/ DROPS OPHTHALMIC at 08:32

## 2023-01-01 RX ADMIN — Medication 10 MILLIGRAM(S): at 17:06

## 2023-01-01 RX ADMIN — ALBUTEROL 2 PUFF(S): 90 AEROSOL, METERED ORAL at 09:23

## 2023-01-01 RX ADMIN — Medication 50 MILLILITER(S): at 22:23

## 2023-01-01 RX ADMIN — MUPIROCIN 1 APPLICATION(S): 20 OINTMENT TOPICAL at 06:47

## 2023-01-01 RX ADMIN — Medication 5 MILLIGRAM(S): at 18:03

## 2023-01-01 RX ADMIN — FINASTERIDE 5 MILLIGRAM(S): 5 TABLET, FILM COATED ORAL at 12:22

## 2023-01-01 RX ADMIN — DIVALPROEX SODIUM 500 MILLIGRAM(S): 500 TABLET, DELAYED RELEASE ORAL at 17:37

## 2023-01-01 RX ADMIN — ALBUTEROL 2 PUFF(S): 90 AEROSOL, METERED ORAL at 21:35

## 2023-01-01 RX ADMIN — PANTOPRAZOLE SODIUM 40 MILLIGRAM(S): 20 TABLET, DELAYED RELEASE ORAL at 05:33

## 2023-01-01 RX ADMIN — CEFEPIME 100 MILLIGRAM(S): 1 INJECTION, POWDER, FOR SOLUTION INTRAMUSCULAR; INTRAVENOUS at 18:05

## 2023-01-01 RX ADMIN — Medication 1 APPLICATION(S): at 22:52

## 2023-01-01 RX ADMIN — DORZOLAMIDE HYDROCHLORIDE 1 DROP(S): 20 SOLUTION/ DROPS OPHTHALMIC at 09:35

## 2023-01-01 RX ADMIN — DIVALPROEX SODIUM 500 MILLIGRAM(S): 500 TABLET, DELAYED RELEASE ORAL at 18:16

## 2023-01-01 RX ADMIN — ALBUTEROL 2 PUFF(S): 90 AEROSOL, METERED ORAL at 16:17

## 2023-01-01 RX ADMIN — Medication 1 GRAM(S): at 00:05

## 2023-01-01 RX ADMIN — SODIUM CHLORIDE 3 MILLILITER(S): 9 INJECTION INTRAMUSCULAR; INTRAVENOUS; SUBCUTANEOUS at 03:49

## 2023-01-01 RX ADMIN — Medication 50 MILLIGRAM(S): at 21:28

## 2023-01-01 RX ADMIN — CHLORHEXIDINE GLUCONATE 1 APPLICATION(S): 213 SOLUTION TOPICAL at 06:12

## 2023-01-01 RX ADMIN — PANTOPRAZOLE SODIUM 40 MILLIGRAM(S): 20 TABLET, DELAYED RELEASE ORAL at 11:28

## 2023-01-01 RX ADMIN — Medication 50 MILLIGRAM(S): at 21:00

## 2023-01-01 RX ADMIN — Medication 1 GRAM(S): at 17:16

## 2023-01-01 RX ADMIN — ALBUTEROL 2.5 MILLIGRAM(S): 90 AEROSOL, METERED ORAL at 14:55

## 2023-01-01 RX ADMIN — Medication 1300 MILLIGRAM(S): at 13:28

## 2023-01-01 RX ADMIN — ALBUTEROL 2 PUFF(S): 90 AEROSOL, METERED ORAL at 08:23

## 2023-01-01 RX ADMIN — DIVALPROEX SODIUM 500 MILLIGRAM(S): 500 TABLET, DELAYED RELEASE ORAL at 05:30

## 2023-01-01 RX ADMIN — Medication 50 MILLIGRAM(S): at 22:36

## 2023-01-01 RX ADMIN — NYSTATIN CREAM 1 APPLICATION(S): 100000 CREAM TOPICAL at 05:27

## 2023-01-01 RX ADMIN — Medication 5 MILLIGRAM(S): at 02:23

## 2023-01-01 RX ADMIN — CHLORHEXIDINE GLUCONATE 1 APPLICATION(S): 213 SOLUTION TOPICAL at 05:39

## 2023-01-01 RX ADMIN — Medication 50 MILLIEQUIVALENT(S): at 02:08

## 2023-01-01 RX ADMIN — Medication 5 MILLIGRAM(S): at 09:46

## 2023-01-01 RX ADMIN — BUPROPION HYDROCHLORIDE 150 MILLIGRAM(S): 150 TABLET, EXTENDED RELEASE ORAL at 12:02

## 2023-01-01 RX ADMIN — Medication 50 MILLIGRAM(S): at 22:14

## 2023-01-01 RX ADMIN — ALBUTEROL 2 PUFF(S): 90 AEROSOL, METERED ORAL at 02:08

## 2023-01-01 RX ADMIN — DORZOLAMIDE HYDROCHLORIDE 1 DROP(S): 20 SOLUTION/ DROPS OPHTHALMIC at 12:32

## 2023-01-01 RX ADMIN — MUPIROCIN 1 APPLICATION(S): 20 OINTMENT TOPICAL at 05:27

## 2023-01-01 RX ADMIN — PANTOPRAZOLE SODIUM 40 MILLIGRAM(S): 20 TABLET, DELAYED RELEASE ORAL at 05:44

## 2023-01-01 RX ADMIN — ALBUTEROL 2 PUFF(S): 90 AEROSOL, METERED ORAL at 10:14

## 2023-01-01 RX ADMIN — Medication 100 GRAM(S): at 11:25

## 2023-01-01 RX ADMIN — Medication 137 MICROGRAM(S): at 06:30

## 2023-01-01 RX ADMIN — ALBUTEROL 2 PUFF(S): 90 AEROSOL, METERED ORAL at 21:26

## 2023-01-01 RX ADMIN — FENTANYL CITRATE 8.64 MICROGRAM(S)/KG/HR: 50 INJECTION INTRAVENOUS at 08:19

## 2023-01-01 RX ADMIN — Medication 1 GRAM(S): at 12:26

## 2023-01-01 RX ADMIN — PANTOPRAZOLE SODIUM 40 MILLIGRAM(S): 20 TABLET, DELAYED RELEASE ORAL at 06:30

## 2023-01-01 RX ADMIN — Medication 4 MILLILITER(S): at 13:21

## 2023-01-01 RX ADMIN — CHLORHEXIDINE GLUCONATE 1 APPLICATION(S): 213 SOLUTION TOPICAL at 06:03

## 2023-01-01 RX ADMIN — PANTOPRAZOLE SODIUM 40 MILLIGRAM(S): 20 TABLET, DELAYED RELEASE ORAL at 11:40

## 2023-01-01 RX ADMIN — Medication 1 GRAM(S): at 05:32

## 2023-01-01 RX ADMIN — FINASTERIDE 5 MILLIGRAM(S): 5 TABLET, FILM COATED ORAL at 16:11

## 2023-01-01 RX ADMIN — DIVALPROEX SODIUM 500 MILLIGRAM(S): 500 TABLET, DELAYED RELEASE ORAL at 05:31

## 2023-01-01 RX ADMIN — Medication 10 MILLIGRAM(S): at 05:39

## 2023-01-01 RX ADMIN — CEFEPIME 100 MILLIGRAM(S): 1 INJECTION, POWDER, FOR SOLUTION INTRAMUSCULAR; INTRAVENOUS at 18:01

## 2023-01-01 RX ADMIN — DORZOLAMIDE HYDROCHLORIDE 1 DROP(S): 20 SOLUTION/ DROPS OPHTHALMIC at 08:51

## 2023-01-01 RX ADMIN — ALBUTEROL 2 PUFF(S): 90 AEROSOL, METERED ORAL at 10:29

## 2023-01-01 RX ADMIN — Medication 1 GRAM(S): at 05:53

## 2023-01-01 RX ADMIN — SODIUM CHLORIDE 75 MILLILITER(S): 9 INJECTION, SOLUTION INTRAVENOUS at 16:07

## 2023-01-01 RX ADMIN — DORZOLAMIDE HYDROCHLORIDE 1 DROP(S): 20 SOLUTION/ DROPS OPHTHALMIC at 21:00

## 2023-01-01 RX ADMIN — SODIUM CHLORIDE 1000 MILLILITER(S): 9 INJECTION, SOLUTION INTRAVENOUS at 11:36

## 2023-01-01 RX ADMIN — TAMSULOSIN HYDROCHLORIDE 0.4 MILLIGRAM(S): 0.4 CAPSULE ORAL at 21:32

## 2023-01-01 RX ADMIN — ALBUTEROL 2 PUFF(S): 90 AEROSOL, METERED ORAL at 09:10

## 2023-01-01 RX ADMIN — Medication 1300 MILLIGRAM(S): at 21:44

## 2023-01-01 RX ADMIN — Medication 100 MILLIEQUIVALENT(S): at 05:33

## 2023-01-01 RX ADMIN — ALBUTEROL 2.5 MILLIGRAM(S): 90 AEROSOL, METERED ORAL at 20:16

## 2023-01-01 RX ADMIN — Medication 50 MILLIGRAM(S): at 22:26

## 2023-01-01 RX ADMIN — BUPROPION HYDROCHLORIDE 150 MILLIGRAM(S): 150 TABLET, EXTENDED RELEASE ORAL at 11:33

## 2023-01-01 RX ADMIN — Medication 55 MILLIGRAM(S): at 08:06

## 2023-01-01 RX ADMIN — Medication 1 GRAM(S): at 17:12

## 2023-01-01 RX ADMIN — Medication 1 GRAM(S): at 17:05

## 2023-01-01 RX ADMIN — Medication 1300 MILLIGRAM(S): at 21:32

## 2023-01-01 RX ADMIN — ALBUTEROL 2.5 MILLIGRAM(S): 90 AEROSOL, METERED ORAL at 13:20

## 2023-01-01 RX ADMIN — Medication 1 APPLICATION(S): at 22:22

## 2023-01-01 RX ADMIN — HEPARIN SODIUM 5000 UNIT(S): 5000 INJECTION INTRAVENOUS; SUBCUTANEOUS at 22:20

## 2023-01-01 RX ADMIN — CEFEPIME 100 MILLIGRAM(S): 1 INJECTION, POWDER, FOR SOLUTION INTRAMUSCULAR; INTRAVENOUS at 17:11

## 2023-01-01 RX ADMIN — Medication 50 MILLILITER(S): at 05:46

## 2023-01-01 RX ADMIN — Medication 1 GRAM(S): at 12:11

## 2023-01-01 RX ADMIN — MIDODRINE HYDROCHLORIDE 5 MILLIGRAM(S): 2.5 TABLET ORAL at 12:51

## 2023-01-01 RX ADMIN — NYSTATIN CREAM 1 APPLICATION(S): 100000 CREAM TOPICAL at 17:21

## 2023-01-01 RX ADMIN — SODIUM CHLORIDE 50 MILLILITER(S): 9 INJECTION, SOLUTION INTRAVENOUS at 08:45

## 2023-01-01 RX ADMIN — CEFEPIME 100 MILLIGRAM(S): 1 INJECTION, POWDER, FOR SOLUTION INTRAMUSCULAR; INTRAVENOUS at 05:57

## 2023-01-01 RX ADMIN — PROPOFOL 7.78 MICROGRAM(S)/KG/MIN: 10 INJECTION, EMULSION INTRAVENOUS at 05:35

## 2023-01-01 RX ADMIN — Medication 5 MILLIGRAM(S): at 23:52

## 2023-01-01 RX ADMIN — Medication 650 MILLIGRAM(S): at 22:43

## 2023-01-01 RX ADMIN — Medication 25 GRAM(S): at 16:27

## 2023-01-01 RX ADMIN — Medication 1 GRAM(S): at 12:05

## 2023-01-01 RX ADMIN — DORZOLAMIDE HYDROCHLORIDE 1 DROP(S): 20 SOLUTION/ DROPS OPHTHALMIC at 08:41

## 2023-01-01 RX ADMIN — HEPARIN SODIUM 5000 UNIT(S): 5000 INJECTION INTRAVENOUS; SUBCUTANEOUS at 15:24

## 2023-01-01 RX ADMIN — NYSTATIN CREAM 1 APPLICATION(S): 100000 CREAM TOPICAL at 05:35

## 2023-01-01 RX ADMIN — Medication 1300 MILLIGRAM(S): at 14:25

## 2023-01-01 RX ADMIN — Medication 1 APPLICATION(S): at 22:04

## 2023-01-01 RX ADMIN — HEPARIN SODIUM 5000 UNIT(S): 5000 INJECTION INTRAVENOUS; SUBCUTANEOUS at 14:15

## 2023-01-01 RX ADMIN — Medication 1 GRAM(S): at 17:47

## 2023-01-01 RX ADMIN — MUPIROCIN 1 APPLICATION(S): 20 OINTMENT TOPICAL at 18:05

## 2023-01-01 RX ADMIN — TAMSULOSIN HYDROCHLORIDE 0.4 MILLIGRAM(S): 0.4 CAPSULE ORAL at 21:24

## 2023-01-01 RX ADMIN — SODIUM CHLORIDE 100 MILLILITER(S): 9 INJECTION, SOLUTION INTRAVENOUS at 10:47

## 2023-01-01 RX ADMIN — ALBUTEROL 2.5 MILLIGRAM(S): 90 AEROSOL, METERED ORAL at 03:02

## 2023-01-01 RX ADMIN — Medication 1 APPLICATION(S): at 21:24

## 2023-01-01 RX ADMIN — CHLORHEXIDINE GLUCONATE 1 APPLICATION(S): 213 SOLUTION TOPICAL at 05:32

## 2023-01-01 RX ADMIN — ALBUTEROL 2 PUFF(S): 90 AEROSOL, METERED ORAL at 21:19

## 2023-01-01 RX ADMIN — Medication 1 APPLICATION(S): at 22:21

## 2023-01-01 RX ADMIN — ALBUTEROL 2.5 MILLIGRAM(S): 90 AEROSOL, METERED ORAL at 17:11

## 2023-01-01 RX ADMIN — DORZOLAMIDE HYDROCHLORIDE 1 DROP(S): 20 SOLUTION/ DROPS OPHTHALMIC at 21:20

## 2023-01-01 RX ADMIN — DIVALPROEX SODIUM 500 MILLIGRAM(S): 500 TABLET, DELAYED RELEASE ORAL at 06:50

## 2023-01-01 RX ADMIN — Medication 50 MILLILITER(S): at 10:53

## 2023-01-01 RX ADMIN — Medication 8.1 MICROGRAM(S)/KG/MIN: at 17:16

## 2023-01-01 RX ADMIN — SODIUM CHLORIDE 50 MILLILITER(S): 9 INJECTION, SOLUTION INTRAVENOUS at 16:10

## 2023-01-01 RX ADMIN — Medication 4 MILLILITER(S): at 09:21

## 2023-01-01 RX ADMIN — NYSTATIN CREAM 1 APPLICATION(S): 100000 CREAM TOPICAL at 17:11

## 2023-01-01 RX ADMIN — ALBUTEROL 2 PUFF(S): 90 AEROSOL, METERED ORAL at 12:06

## 2023-01-01 RX ADMIN — NYSTATIN CREAM 1 APPLICATION(S): 100000 CREAM TOPICAL at 05:21

## 2023-01-01 RX ADMIN — HEPARIN SODIUM 5000 UNIT(S): 5000 INJECTION INTRAVENOUS; SUBCUTANEOUS at 13:50

## 2023-01-01 RX ADMIN — DORZOLAMIDE HYDROCHLORIDE 1 DROP(S): 20 SOLUTION/ DROPS OPHTHALMIC at 22:28

## 2023-01-01 RX ADMIN — Medication 100 MILLIEQUIVALENT(S): at 11:05

## 2023-01-01 RX ADMIN — Medication 1 APPLICATION(S): at 22:16

## 2023-01-01 RX ADMIN — Medication 125 MILLILITER(S): at 04:28

## 2023-01-01 RX ADMIN — HEPARIN SODIUM 5000 UNIT(S): 5000 INJECTION INTRAVENOUS; SUBCUTANEOUS at 14:14

## 2023-01-01 RX ADMIN — Medication 1 GRAM(S): at 06:12

## 2023-01-01 RX ADMIN — FENTANYL CITRATE 50 MICROGRAM(S): 50 INJECTION INTRAVENOUS at 07:45

## 2023-01-01 RX ADMIN — Medication 50 MILLILITER(S): at 21:32

## 2023-01-01 RX ADMIN — TAMSULOSIN HYDROCHLORIDE 0.4 MILLIGRAM(S): 0.4 CAPSULE ORAL at 21:00

## 2023-01-01 RX ADMIN — HEPARIN SODIUM 5000 UNIT(S): 5000 INJECTION INTRAVENOUS; SUBCUTANEOUS at 23:25

## 2023-01-01 RX ADMIN — PANTOPRAZOLE SODIUM 40 MILLIGRAM(S): 20 TABLET, DELAYED RELEASE ORAL at 05:57

## 2023-01-01 RX ADMIN — ALBUTEROL 2 PUFF(S): 90 AEROSOL, METERED ORAL at 15:48

## 2023-01-01 RX ADMIN — Medication 5 MILLIGRAM(S): at 11:40

## 2023-01-01 RX ADMIN — Medication 400 MILLIGRAM(S): at 17:24

## 2023-01-01 RX ADMIN — DORZOLAMIDE HYDROCHLORIDE 1 DROP(S): 20 SOLUTION/ DROPS OPHTHALMIC at 21:31

## 2023-01-01 RX ADMIN — ALBUTEROL 2 PUFF(S): 90 AEROSOL, METERED ORAL at 12:31

## 2023-01-01 RX ADMIN — Medication 10 MILLIGRAM(S): at 08:45

## 2023-01-01 RX ADMIN — DORZOLAMIDE HYDROCHLORIDE 1 DROP(S): 20 SOLUTION/ DROPS OPHTHALMIC at 22:19

## 2023-01-01 RX ADMIN — BUPROPION HYDROCHLORIDE 150 MILLIGRAM(S): 150 TABLET, EXTENDED RELEASE ORAL at 13:50

## 2023-01-01 RX ADMIN — Medication 1 GRAM(S): at 22:33

## 2023-01-01 RX ADMIN — PANTOPRAZOLE SODIUM 40 MILLIGRAM(S): 20 TABLET, DELAYED RELEASE ORAL at 05:38

## 2023-01-01 RX ADMIN — SODIUM CHLORIDE 3 MILLILITER(S): 9 INJECTION INTRAMUSCULAR; INTRAVENOUS; SUBCUTANEOUS at 03:01

## 2023-01-01 RX ADMIN — ALBUTEROL 2 PUFF(S): 90 AEROSOL, METERED ORAL at 11:40

## 2023-01-01 RX ADMIN — NYSTATIN CREAM 1 APPLICATION(S): 100000 CREAM TOPICAL at 18:40

## 2023-01-01 RX ADMIN — ALBUTEROL 2 PUFF(S): 90 AEROSOL, METERED ORAL at 15:30

## 2023-01-01 RX ADMIN — Medication 20 MILLIEQUIVALENT(S): at 17:15

## 2023-01-01 RX ADMIN — BUPROPION HYDROCHLORIDE 150 MILLIGRAM(S): 150 TABLET, EXTENDED RELEASE ORAL at 12:32

## 2023-01-01 RX ADMIN — Medication 137 MICROGRAM(S): at 05:39

## 2023-01-01 RX ADMIN — ONDANSETRON 4 MILLIGRAM(S): 8 TABLET, FILM COATED ORAL at 22:38

## 2023-01-01 RX ADMIN — Medication 50 MILLILITER(S): at 12:34

## 2023-01-01 RX ADMIN — CEFEPIME 100 MILLIGRAM(S): 1 INJECTION, POWDER, FOR SOLUTION INTRAMUSCULAR; INTRAVENOUS at 17:45

## 2023-01-01 RX ADMIN — DORZOLAMIDE HYDROCHLORIDE 1 DROP(S): 20 SOLUTION/ DROPS OPHTHALMIC at 08:06

## 2023-01-01 RX ADMIN — PANTOPRAZOLE SODIUM 80 MILLIGRAM(S): 20 TABLET, DELAYED RELEASE ORAL at 03:24

## 2023-01-01 RX ADMIN — HEPARIN SODIUM 5000 UNIT(S): 5000 INJECTION INTRAVENOUS; SUBCUTANEOUS at 22:15

## 2023-01-01 RX ADMIN — DORZOLAMIDE HYDROCHLORIDE 1 DROP(S): 20 SOLUTION/ DROPS OPHTHALMIC at 08:55

## 2023-01-01 RX ADMIN — ETOMIDATE 20 MILLIGRAM(S): 2 INJECTION INTRAVENOUS at 21:41

## 2023-01-01 RX ADMIN — PANTOPRAZOLE SODIUM 40 MILLIGRAM(S): 20 TABLET, DELAYED RELEASE ORAL at 06:23

## 2023-01-01 RX ADMIN — Medication 50 MILLILITER(S): at 13:21

## 2023-01-01 RX ADMIN — Medication 50 MILLIGRAM(S): at 21:31

## 2023-01-01 RX ADMIN — PANTOPRAZOLE SODIUM 40 MILLIGRAM(S): 20 TABLET, DELAYED RELEASE ORAL at 17:24

## 2023-01-01 RX ADMIN — CEFEPIME 100 MILLIGRAM(S): 1 INJECTION, POWDER, FOR SOLUTION INTRAMUSCULAR; INTRAVENOUS at 05:29

## 2023-01-01 RX ADMIN — Medication 125 MILLILITER(S): at 08:45

## 2023-01-01 RX ADMIN — DORZOLAMIDE HYDROCHLORIDE 1 DROP(S): 20 SOLUTION/ DROPS OPHTHALMIC at 22:38

## 2023-01-01 RX ADMIN — PANTOPRAZOLE SODIUM 40 MILLIGRAM(S): 20 TABLET, DELAYED RELEASE ORAL at 11:25

## 2023-01-01 RX ADMIN — Medication 1 APPLICATION(S): at 21:18

## 2023-01-01 RX ADMIN — HEPARIN SODIUM 5000 UNIT(S): 5000 INJECTION INTRAVENOUS; SUBCUTANEOUS at 06:48

## 2023-01-01 RX ADMIN — Medication 125 MICROGRAM(S): at 22:20

## 2023-01-01 RX ADMIN — PANTOPRAZOLE SODIUM 40 MILLIGRAM(S): 20 TABLET, DELAYED RELEASE ORAL at 18:06

## 2023-01-01 RX ADMIN — Medication 1 GRAM(S): at 18:40

## 2023-01-01 RX ADMIN — PANTOPRAZOLE SODIUM 40 MILLIGRAM(S): 20 TABLET, DELAYED RELEASE ORAL at 06:48

## 2023-01-01 RX ADMIN — BUPROPION HYDROCHLORIDE 150 MILLIGRAM(S): 150 TABLET, EXTENDED RELEASE ORAL at 12:51

## 2023-01-01 RX ADMIN — PANTOPRAZOLE SODIUM 40 MILLIGRAM(S): 20 TABLET, DELAYED RELEASE ORAL at 18:42

## 2023-01-01 RX ADMIN — DORZOLAMIDE HYDROCHLORIDE 1 DROP(S): 20 SOLUTION/ DROPS OPHTHALMIC at 08:45

## 2023-01-01 RX ADMIN — DORZOLAMIDE HYDROCHLORIDE 1 DROP(S): 20 SOLUTION/ DROPS OPHTHALMIC at 08:46

## 2023-01-01 RX ADMIN — NYSTATIN CREAM 1 APPLICATION(S): 100000 CREAM TOPICAL at 17:04

## 2023-01-01 RX ADMIN — Medication 1 APPLICATION(S): at 01:50

## 2023-01-01 RX ADMIN — Medication 1 APPLICATION(S): at 22:44

## 2023-01-01 RX ADMIN — BUPROPION HYDROCHLORIDE 150 MILLIGRAM(S): 150 TABLET, EXTENDED RELEASE ORAL at 13:17

## 2023-01-01 RX ADMIN — Medication 1 GRAM(S): at 00:09

## 2023-01-01 RX ADMIN — DORZOLAMIDE HYDROCHLORIDE 1 DROP(S): 20 SOLUTION/ DROPS OPHTHALMIC at 22:09

## 2023-01-01 RX ADMIN — Medication 1300 MILLIGRAM(S): at 22:07

## 2023-01-01 RX ADMIN — FINASTERIDE 5 MILLIGRAM(S): 5 TABLET, FILM COATED ORAL at 11:33

## 2023-01-01 RX ADMIN — ALBUTEROL 2 PUFF(S): 90 AEROSOL, METERED ORAL at 08:15

## 2023-01-01 RX ADMIN — BUPROPION HYDROCHLORIDE 150 MILLIGRAM(S): 150 TABLET, EXTENDED RELEASE ORAL at 11:59

## 2023-01-01 RX ADMIN — CEFEPIME 100 MILLIGRAM(S): 1 INJECTION, POWDER, FOR SOLUTION INTRAMUSCULAR; INTRAVENOUS at 06:33

## 2023-01-01 RX ADMIN — Medication 5 MILLIGRAM(S): at 03:24

## 2023-01-01 RX ADMIN — ALBUTEROL 2 PUFF(S): 90 AEROSOL, METERED ORAL at 03:20

## 2023-01-01 RX ADMIN — BUPROPION HYDROCHLORIDE 150 MILLIGRAM(S): 150 TABLET, EXTENDED RELEASE ORAL at 12:13

## 2023-01-01 RX ADMIN — Medication 1 GRAM(S): at 05:31

## 2023-01-01 RX ADMIN — TAMSULOSIN HYDROCHLORIDE 0.4 MILLIGRAM(S): 0.4 CAPSULE ORAL at 22:20

## 2023-01-01 RX ADMIN — Medication 1 GRAM(S): at 23:22

## 2023-01-01 RX ADMIN — DIVALPROEX SODIUM 500 MILLIGRAM(S): 500 TABLET, DELAYED RELEASE ORAL at 17:36

## 2023-01-01 RX ADMIN — CEFEPIME 100 MILLIGRAM(S): 1 INJECTION, POWDER, FOR SOLUTION INTRAMUSCULAR; INTRAVENOUS at 05:37

## 2023-01-01 RX ADMIN — Medication 1 GRAM(S): at 12:30

## 2023-01-01 RX ADMIN — CEFEPIME 100 MILLIGRAM(S): 1 INJECTION, POWDER, FOR SOLUTION INTRAMUSCULAR; INTRAVENOUS at 05:33

## 2023-01-01 RX ADMIN — ALBUTEROL 2 PUFF(S): 90 AEROSOL, METERED ORAL at 21:31

## 2023-01-01 RX ADMIN — ALBUTEROL 2 PUFF(S): 90 AEROSOL, METERED ORAL at 21:25

## 2023-01-01 RX ADMIN — Medication 1 GRAM(S): at 11:29

## 2023-01-01 RX ADMIN — FINASTERIDE 5 MILLIGRAM(S): 5 TABLET, FILM COATED ORAL at 11:19

## 2023-01-01 RX ADMIN — SODIUM CHLORIDE 1000 MILLILITER(S): 9 INJECTION, SOLUTION INTRAVENOUS at 23:45

## 2023-01-01 RX ADMIN — Medication 5 MILLIGRAM(S): at 14:37

## 2023-01-01 RX ADMIN — DORZOLAMIDE HYDROCHLORIDE 1 DROP(S): 20 SOLUTION/ DROPS OPHTHALMIC at 21:58

## 2023-01-01 RX ADMIN — Medication 1300 MILLIGRAM(S): at 17:11

## 2023-01-01 RX ADMIN — DORZOLAMIDE HYDROCHLORIDE 1 DROP(S): 20 SOLUTION/ DROPS OPHTHALMIC at 08:16

## 2023-01-01 RX ADMIN — Medication 1 APPLICATION(S): at 22:28

## 2023-01-01 RX ADMIN — DORZOLAMIDE HYDROCHLORIDE 1 DROP(S): 20 SOLUTION/ DROPS OPHTHALMIC at 08:14

## 2023-01-01 RX ADMIN — Medication 1 GRAM(S): at 23:28

## 2023-01-01 RX ADMIN — Medication 400 MILLIGRAM(S): at 01:24

## 2023-01-01 RX ADMIN — CEFEPIME 100 MILLIGRAM(S): 1 INJECTION, POWDER, FOR SOLUTION INTRAMUSCULAR; INTRAVENOUS at 17:47

## 2023-01-01 RX ADMIN — ALBUTEROL 2 PUFF(S): 90 AEROSOL, METERED ORAL at 08:46

## 2023-01-01 RX ADMIN — PANTOPRAZOLE SODIUM 40 MILLIGRAM(S): 20 TABLET, DELAYED RELEASE ORAL at 05:32

## 2023-01-01 RX ADMIN — Medication 1 GRAM(S): at 23:06

## 2023-01-01 RX ADMIN — Medication 55 MILLIGRAM(S): at 17:05

## 2023-01-01 RX ADMIN — ONDANSETRON 4 MILLIGRAM(S): 8 TABLET, FILM COATED ORAL at 22:00

## 2023-01-01 RX ADMIN — Medication 50 MILLIGRAM(S): at 22:19

## 2023-01-01 RX ADMIN — SODIUM CHLORIDE 3 MILLILITER(S): 9 INJECTION INTRAMUSCULAR; INTRAVENOUS; SUBCUTANEOUS at 21:34

## 2023-01-01 RX ADMIN — HEPARIN SODIUM 5000 UNIT(S): 5000 INJECTION INTRAVENOUS; SUBCUTANEOUS at 22:24

## 2023-01-01 RX ADMIN — DIVALPROEX SODIUM 500 MILLIGRAM(S): 500 TABLET, DELAYED RELEASE ORAL at 17:50

## 2023-01-01 RX ADMIN — FINASTERIDE 5 MILLIGRAM(S): 5 TABLET, FILM COATED ORAL at 12:51

## 2023-01-01 RX ADMIN — CHLORHEXIDINE GLUCONATE 1 APPLICATION(S): 213 SOLUTION TOPICAL at 05:37

## 2023-01-01 RX ADMIN — TAMSULOSIN HYDROCHLORIDE 0.4 MILLIGRAM(S): 0.4 CAPSULE ORAL at 22:15

## 2023-01-01 RX ADMIN — DORZOLAMIDE HYDROCHLORIDE 1 DROP(S): 20 SOLUTION/ DROPS OPHTHALMIC at 08:59

## 2023-01-01 RX ADMIN — Medication 1 GRAM(S): at 18:06

## 2023-01-01 RX ADMIN — Medication 4 MILLILITER(S): at 19:21

## 2023-01-01 RX ADMIN — Medication 650 MILLIGRAM(S): at 13:19

## 2023-01-01 RX ADMIN — HEPARIN SODIUM 5000 UNIT(S): 5000 INJECTION INTRAVENOUS; SUBCUTANEOUS at 05:31

## 2023-01-01 RX ADMIN — Medication 166.67 MILLIGRAM(S): at 04:23

## 2023-01-01 RX ADMIN — ONDANSETRON 4 MILLIGRAM(S): 8 TABLET, FILM COATED ORAL at 08:22

## 2023-01-01 RX ADMIN — Medication 1 APPLICATION(S): at 21:50

## 2023-01-01 RX ADMIN — Medication 50 MILLILITER(S): at 05:20

## 2023-01-01 RX ADMIN — Medication 50 MILLILITER(S): at 22:18

## 2023-01-01 RX ADMIN — CEFEPIME 100 MILLIGRAM(S): 1 INJECTION, POWDER, FOR SOLUTION INTRAMUSCULAR; INTRAVENOUS at 06:11

## 2023-01-01 RX ADMIN — CHLORHEXIDINE GLUCONATE 1 APPLICATION(S): 213 SOLUTION TOPICAL at 05:43

## 2023-01-01 RX ADMIN — Medication 75 MEQ/KG/HR: at 06:21

## 2023-01-01 RX ADMIN — Medication 50 MILLILITER(S): at 15:32

## 2023-01-01 RX ADMIN — ONDANSETRON 4 MILLIGRAM(S): 8 TABLET, FILM COATED ORAL at 08:14

## 2023-01-01 RX ADMIN — Medication 4 MILLILITER(S): at 20:17

## 2023-01-01 RX ADMIN — HEPARIN SODIUM 5000 UNIT(S): 5000 INJECTION INTRAVENOUS; SUBCUTANEOUS at 06:32

## 2023-01-01 RX ADMIN — ALBUTEROL 2 PUFF(S): 90 AEROSOL, METERED ORAL at 15:13

## 2023-01-01 RX ADMIN — ALBUTEROL 2 PUFF(S): 90 AEROSOL, METERED ORAL at 17:06

## 2023-01-01 RX ADMIN — HEPARIN SODIUM 5000 UNIT(S): 5000 INJECTION INTRAVENOUS; SUBCUTANEOUS at 05:30

## 2023-01-01 RX ADMIN — PROPOFOL 7.78 MICROGRAM(S)/KG/MIN: 10 INJECTION, EMULSION INTRAVENOUS at 00:34

## 2023-01-01 RX ADMIN — DORZOLAMIDE HYDROCHLORIDE 1 DROP(S): 20 SOLUTION/ DROPS OPHTHALMIC at 11:05

## 2023-01-01 RX ADMIN — Medication 1 GRAM(S): at 13:18

## 2023-01-01 RX ADMIN — HEPARIN SODIUM 5000 UNIT(S): 5000 INJECTION INTRAVENOUS; SUBCUTANEOUS at 05:45

## 2023-01-01 RX ADMIN — ALBUTEROL 2 PUFF(S): 90 AEROSOL, METERED ORAL at 22:15

## 2023-01-01 RX ADMIN — SODIUM CHLORIDE 3 MILLILITER(S): 9 INJECTION INTRAMUSCULAR; INTRAVENOUS; SUBCUTANEOUS at 15:10

## 2023-01-01 RX ADMIN — Medication 50 MILLILITER(S): at 00:19

## 2023-01-01 RX ADMIN — ALBUTEROL 2 PUFF(S): 90 AEROSOL, METERED ORAL at 20:41

## 2023-01-01 RX ADMIN — VASOPRESSIN 6 UNIT(S)/MIN: 20 INJECTION INTRAVENOUS at 06:18

## 2023-01-01 RX ADMIN — ALBUTEROL 2 PUFF(S): 90 AEROSOL, METERED ORAL at 17:38

## 2023-01-01 RX ADMIN — CEFEPIME 100 MILLIGRAM(S): 1 INJECTION, POWDER, FOR SOLUTION INTRAMUSCULAR; INTRAVENOUS at 17:37

## 2023-01-01 RX ADMIN — Medication 1 TABLET(S): at 17:57

## 2023-01-01 RX ADMIN — FENTANYL CITRATE 50 MICROGRAM(S): 50 INJECTION INTRAVENOUS at 07:35

## 2023-01-01 RX ADMIN — ALBUTEROL 2 PUFF(S): 90 AEROSOL, METERED ORAL at 22:21

## 2023-01-01 RX ADMIN — DORZOLAMIDE HYDROCHLORIDE 1 DROP(S): 20 SOLUTION/ DROPS OPHTHALMIC at 22:41

## 2023-01-01 RX ADMIN — DORZOLAMIDE HYDROCHLORIDE 1 DROP(S): 20 SOLUTION/ DROPS OPHTHALMIC at 22:35

## 2023-01-01 RX ADMIN — DIVALPROEX SODIUM 500 MILLIGRAM(S): 500 TABLET, DELAYED RELEASE ORAL at 18:00

## 2023-01-01 RX ADMIN — ALBUTEROL 2 PUFF(S): 90 AEROSOL, METERED ORAL at 14:25

## 2023-01-01 RX ADMIN — ALBUTEROL 2 PUFF(S): 90 AEROSOL, METERED ORAL at 14:15

## 2023-01-01 RX ADMIN — Medication 1 GRAM(S): at 11:33

## 2023-01-01 RX ADMIN — TAMSULOSIN HYDROCHLORIDE 0.4 MILLIGRAM(S): 0.4 CAPSULE ORAL at 21:17

## 2023-01-01 RX ADMIN — SODIUM CHLORIDE 3 MILLILITER(S): 9 INJECTION INTRAMUSCULAR; INTRAVENOUS; SUBCUTANEOUS at 09:21

## 2023-01-01 RX ADMIN — NYSTATIN CREAM 1 APPLICATION(S): 100000 CREAM TOPICAL at 05:53

## 2023-01-01 RX ADMIN — FINASTERIDE 5 MILLIGRAM(S): 5 TABLET, FILM COATED ORAL at 11:36

## 2023-01-01 RX ADMIN — HYDROMORPHONE HYDROCHLORIDE 1 MILLIGRAM(S): 2 INJECTION INTRAMUSCULAR; INTRAVENOUS; SUBCUTANEOUS at 03:56

## 2023-01-01 RX ADMIN — Medication 1 GRAM(S): at 13:49

## 2023-01-01 RX ADMIN — Medication 50 MILLIGRAM(S): at 21:17

## 2023-01-01 RX ADMIN — Medication 50 MILLIEQUIVALENT(S): at 06:00

## 2023-01-01 RX ADMIN — FINASTERIDE 5 MILLIGRAM(S): 5 TABLET, FILM COATED ORAL at 12:33

## 2023-01-01 RX ADMIN — Medication 650 MILLIGRAM(S): at 07:14

## 2023-01-01 RX ADMIN — NYSTATIN CREAM 1 APPLICATION(S): 100000 CREAM TOPICAL at 05:10

## 2023-01-01 RX ADMIN — Medication 1 GRAM(S): at 00:01

## 2023-01-01 RX ADMIN — Medication 4 MILLILITER(S): at 17:11

## 2023-01-01 RX ADMIN — ALBUTEROL 2 PUFF(S): 90 AEROSOL, METERED ORAL at 22:13

## 2023-01-01 RX ADMIN — ALBUTEROL 2.5 MILLIGRAM(S): 90 AEROSOL, METERED ORAL at 20:12

## 2023-01-01 RX ADMIN — Medication 1 GRAM(S): at 17:41

## 2023-01-01 RX ADMIN — Medication 1 GRAM(S): at 12:31

## 2023-01-01 RX ADMIN — Medication 1 GRAM(S): at 17:03

## 2023-01-01 RX ADMIN — Medication 100 MILLIEQUIVALENT(S): at 09:23

## 2023-01-01 RX ADMIN — DORZOLAMIDE HYDROCHLORIDE 1 DROP(S): 20 SOLUTION/ DROPS OPHTHALMIC at 22:14

## 2023-01-01 RX ADMIN — Medication 100 MILLIEQUIVALENT(S): at 04:52

## 2023-01-01 RX ADMIN — SODIUM CHLORIDE 1000 MILLILITER(S): 9 INJECTION INTRAMUSCULAR; INTRAVENOUS; SUBCUTANEOUS at 20:37

## 2023-01-01 RX ADMIN — BUPROPION HYDROCHLORIDE 150 MILLIGRAM(S): 150 TABLET, EXTENDED RELEASE ORAL at 16:11

## 2023-01-01 RX ADMIN — Medication 3.95 MICROGRAM(S)/KG/MIN: at 21:44

## 2023-01-01 RX ADMIN — Medication 5 MILLIGRAM(S): at 15:48

## 2023-01-01 RX ADMIN — TAMSULOSIN HYDROCHLORIDE 0.4 MILLIGRAM(S): 0.4 CAPSULE ORAL at 21:47

## 2023-01-01 RX ADMIN — DIVALPROEX SODIUM 500 MILLIGRAM(S): 500 TABLET, DELAYED RELEASE ORAL at 06:12

## 2023-01-01 RX ADMIN — SODIUM CHLORIDE 1000 MILLILITER(S): 9 INJECTION, SOLUTION INTRAVENOUS at 23:08

## 2023-01-01 RX ADMIN — Medication 95 MICROGRAM(S): at 04:31

## 2023-01-01 RX ADMIN — POTASSIUM PHOSPHATE, MONOBASIC POTASSIUM PHOSPHATE, DIBASIC 83.33 MILLIMOLE(S): 236; 224 INJECTION, SOLUTION INTRAVENOUS at 21:45

## 2023-01-01 RX ADMIN — Medication 650 MILLIGRAM(S): at 05:39

## 2023-01-01 RX ADMIN — CEFEPIME 100 MILLIGRAM(S): 1 INJECTION, POWDER, FOR SOLUTION INTRAMUSCULAR; INTRAVENOUS at 17:43

## 2023-01-01 RX ADMIN — FINASTERIDE 5 MILLIGRAM(S): 5 TABLET, FILM COATED ORAL at 11:59

## 2023-01-01 RX ADMIN — DORZOLAMIDE HYDROCHLORIDE 1 DROP(S): 20 SOLUTION/ DROPS OPHTHALMIC at 08:23

## 2023-01-01 RX ADMIN — Medication 1300 MILLIGRAM(S): at 21:17

## 2023-01-01 RX ADMIN — Medication 1 APPLICATION(S): at 21:21

## 2023-01-01 RX ADMIN — SODIUM CHLORIDE 3 MILLILITER(S): 9 INJECTION INTRAMUSCULAR; INTRAVENOUS; SUBCUTANEOUS at 09:22

## 2023-01-01 RX ADMIN — CEFEPIME 100 MILLIGRAM(S): 1 INJECTION, POWDER, FOR SOLUTION INTRAMUSCULAR; INTRAVENOUS at 01:24

## 2023-01-01 RX ADMIN — Medication 5 MILLIGRAM(S): at 18:07

## 2023-01-01 RX ADMIN — Medication 1 GRAM(S): at 06:48

## 2023-01-01 RX ADMIN — Medication 50 MILLIEQUIVALENT(S): at 00:50

## 2023-01-01 RX ADMIN — Medication 1 GRAM(S): at 06:01

## 2023-01-01 RX ADMIN — NYSTATIN CREAM 1 APPLICATION(S): 100000 CREAM TOPICAL at 06:01

## 2023-01-01 RX ADMIN — Medication 1 GRAM(S): at 11:28

## 2023-01-01 RX ADMIN — Medication 1 GRAM(S): at 17:45

## 2023-01-01 RX ADMIN — Medication 1 GRAM(S): at 05:57

## 2023-01-01 RX ADMIN — ONDANSETRON 4 MILLIGRAM(S): 8 TABLET, FILM COATED ORAL at 20:41

## 2023-01-01 RX ADMIN — DORZOLAMIDE HYDROCHLORIDE 1 DROP(S): 20 SOLUTION/ DROPS OPHTHALMIC at 08:31

## 2023-01-01 RX ADMIN — Medication 1 GRAM(S): at 00:21

## 2023-01-01 RX ADMIN — DIVALPROEX SODIUM 500 MILLIGRAM(S): 500 TABLET, DELAYED RELEASE ORAL at 17:35

## 2023-01-01 RX ADMIN — TAMSULOSIN HYDROCHLORIDE 0.4 MILLIGRAM(S): 0.4 CAPSULE ORAL at 22:43

## 2023-01-01 RX ADMIN — Medication 1300 MILLIGRAM(S): at 22:15

## 2023-01-01 RX ADMIN — CHLORHEXIDINE GLUCONATE 1 APPLICATION(S): 213 SOLUTION TOPICAL at 06:14

## 2023-01-01 RX ADMIN — Medication 50 MILLIGRAM(S): at 21:58

## 2023-01-01 RX ADMIN — Medication 50 MILLILITER(S): at 00:21

## 2023-01-01 RX ADMIN — TAMSULOSIN HYDROCHLORIDE 0.4 MILLIGRAM(S): 0.4 CAPSULE ORAL at 21:33

## 2023-01-01 RX ADMIN — FINASTERIDE 5 MILLIGRAM(S): 5 TABLET, FILM COATED ORAL at 12:10

## 2023-01-01 RX ADMIN — ALBUTEROL 2 PUFF(S): 90 AEROSOL, METERED ORAL at 11:04

## 2023-01-01 RX ADMIN — DIVALPROEX SODIUM 500 MILLIGRAM(S): 500 TABLET, DELAYED RELEASE ORAL at 06:29

## 2023-01-01 RX ADMIN — Medication 4 MILLILITER(S): at 15:53

## 2023-01-01 RX ADMIN — FINASTERIDE 5 MILLIGRAM(S): 5 TABLET, FILM COATED ORAL at 11:24

## 2023-01-01 RX ADMIN — FENTANYL CITRATE 50 MICROGRAM(S): 50 INJECTION INTRAVENOUS at 05:31

## 2023-01-01 RX ADMIN — Medication 1 GRAM(S): at 17:50

## 2023-01-01 RX ADMIN — TAMSULOSIN HYDROCHLORIDE 0.4 MILLIGRAM(S): 0.4 CAPSULE ORAL at 22:36

## 2023-01-01 RX ADMIN — Medication 5 MILLIGRAM(S): at 13:18

## 2023-01-01 RX ADMIN — ALBUTEROL 2 PUFF(S): 90 AEROSOL, METERED ORAL at 22:14

## 2023-01-01 RX ADMIN — Medication 1 GRAM(S): at 18:31

## 2023-01-01 RX ADMIN — NYSTATIN CREAM 1 APPLICATION(S): 100000 CREAM TOPICAL at 05:36

## 2023-01-01 RX ADMIN — Medication 1 GRAM(S): at 12:01

## 2023-01-01 RX ADMIN — ALBUTEROL 2 PUFF(S): 90 AEROSOL, METERED ORAL at 08:51

## 2023-01-01 RX ADMIN — Medication 50 MILLILITER(S): at 15:12

## 2023-01-01 RX ADMIN — NYSTATIN CREAM 1 APPLICATION(S): 100000 CREAM TOPICAL at 17:41

## 2023-01-01 RX ADMIN — ALBUTEROL 2 PUFF(S): 90 AEROSOL, METERED ORAL at 05:52

## 2023-01-01 RX ADMIN — ALBUTEROL 2 PUFF(S): 90 AEROSOL, METERED ORAL at 15:39

## 2023-01-01 RX ADMIN — ALBUTEROL 2 PUFF(S): 90 AEROSOL, METERED ORAL at 15:26

## 2023-01-01 RX ADMIN — Medication 1 APPLICATION(S): at 22:45

## 2023-01-01 RX ADMIN — DORZOLAMIDE HYDROCHLORIDE 1 DROP(S): 20 SOLUTION/ DROPS OPHTHALMIC at 00:29

## 2023-01-01 RX ADMIN — DORZOLAMIDE HYDROCHLORIDE 1 DROP(S): 20 SOLUTION/ DROPS OPHTHALMIC at 09:23

## 2023-01-01 RX ADMIN — Medication 100 MILLIEQUIVALENT(S): at 09:08

## 2023-01-01 RX ADMIN — Medication 1 APPLICATION(S): at 22:00

## 2023-01-01 RX ADMIN — NYSTATIN CREAM 1 APPLICATION(S): 100000 CREAM TOPICAL at 06:55

## 2023-01-01 RX ADMIN — Medication 1 APPLICATION(S): at 22:13

## 2023-01-01 RX ADMIN — Medication 137 MICROGRAM(S): at 05:29

## 2023-01-01 RX ADMIN — SODIUM CHLORIDE 50 MILLILITER(S): 9 INJECTION, SOLUTION INTRAVENOUS at 05:53

## 2023-01-01 RX ADMIN — DIVALPROEX SODIUM 500 MILLIGRAM(S): 500 TABLET, DELAYED RELEASE ORAL at 06:32

## 2023-01-01 RX ADMIN — HEPARIN SODIUM 5000 UNIT(S): 5000 INJECTION INTRAVENOUS; SUBCUTANEOUS at 22:27

## 2023-01-01 RX ADMIN — Medication 1 GRAM(S): at 05:27

## 2023-01-01 RX ADMIN — Medication 10 MILLIGRAM(S): at 05:54

## 2023-01-01 RX ADMIN — MUPIROCIN 1 APPLICATION(S): 20 OINTMENT TOPICAL at 17:05

## 2023-01-01 RX ADMIN — Medication 7.87 MICROGRAM(S)/KG/MIN: at 20:45

## 2023-01-01 RX ADMIN — Medication 10 MILLIGRAM(S): at 05:38

## 2023-01-01 RX ADMIN — DIVALPROEX SODIUM 500 MILLIGRAM(S): 500 TABLET, DELAYED RELEASE ORAL at 17:45

## 2023-01-01 RX ADMIN — Medication 55 MILLIGRAM(S): at 18:02

## 2023-01-01 RX ADMIN — DIVALPROEX SODIUM 500 MILLIGRAM(S): 500 TABLET, DELAYED RELEASE ORAL at 17:03

## 2023-01-01 RX ADMIN — MUPIROCIN 1 APPLICATION(S): 20 OINTMENT TOPICAL at 17:45

## 2023-01-01 RX ADMIN — DIVALPROEX SODIUM 500 MILLIGRAM(S): 500 TABLET, DELAYED RELEASE ORAL at 19:01

## 2023-01-01 RX ADMIN — ALBUTEROL 2 PUFF(S): 90 AEROSOL, METERED ORAL at 02:46

## 2023-01-01 RX ADMIN — PANTOPRAZOLE SODIUM 40 MILLIGRAM(S): 20 TABLET, DELAYED RELEASE ORAL at 17:37

## 2023-01-01 RX ADMIN — Medication 10 MILLIGRAM(S): at 12:52

## 2023-01-01 RX ADMIN — ALBUTEROL 2 PUFF(S): 90 AEROSOL, METERED ORAL at 14:33

## 2023-01-01 RX ADMIN — ALBUTEROL 2 PUFF(S): 90 AEROSOL, METERED ORAL at 17:41

## 2023-01-01 RX ADMIN — TAMSULOSIN HYDROCHLORIDE 0.4 MILLIGRAM(S): 0.4 CAPSULE ORAL at 21:44

## 2023-01-01 RX ADMIN — Medication 55 MILLIGRAM(S): at 05:44

## 2023-01-01 RX ADMIN — DIVALPROEX SODIUM 500 MILLIGRAM(S): 500 TABLET, DELAYED RELEASE ORAL at 05:38

## 2023-01-01 RX ADMIN — DORZOLAMIDE HYDROCHLORIDE 1 DROP(S): 20 SOLUTION/ DROPS OPHTHALMIC at 09:07

## 2023-01-01 RX ADMIN — Medication 1 GRAM(S): at 11:58

## 2023-01-01 RX ADMIN — HEPARIN SODIUM 5000 UNIT(S): 5000 INJECTION INTRAVENOUS; SUBCUTANEOUS at 15:38

## 2023-01-01 RX ADMIN — Medication 137 MICROGRAM(S): at 06:32

## 2023-01-01 RX ADMIN — SODIUM CHLORIDE 75 MILLILITER(S): 9 INJECTION, SOLUTION INTRAVENOUS at 14:35

## 2023-01-01 RX ADMIN — SODIUM CHLORIDE 75 MILLILITER(S): 9 INJECTION, SOLUTION INTRAVENOUS at 06:03

## 2023-01-01 RX ADMIN — Medication 5 MILLIGRAM(S): at 12:43

## 2023-01-01 RX ADMIN — PANTOPRAZOLE SODIUM 40 MILLIGRAM(S): 20 TABLET, DELAYED RELEASE ORAL at 06:03

## 2023-01-01 RX ADMIN — Medication 10 MILLIGRAM(S): at 00:06

## 2023-01-01 RX ADMIN — CEFEPIME 100 MILLIGRAM(S): 1 INJECTION, POWDER, FOR SOLUTION INTRAMUSCULAR; INTRAVENOUS at 17:34

## 2023-01-01 RX ADMIN — Medication 1 GRAM(S): at 06:36

## 2023-01-01 RX ADMIN — MUPIROCIN 1 APPLICATION(S): 20 OINTMENT TOPICAL at 18:31

## 2023-01-01 RX ADMIN — Medication 25 GRAM(S): at 11:12

## 2023-01-01 RX ADMIN — ROBINUL 0.2 MILLIGRAM(S): 0.2 INJECTION INTRAMUSCULAR; INTRAVENOUS at 00:29

## 2023-01-01 RX ADMIN — Medication 1 GRAM(S): at 05:56

## 2023-01-01 RX ADMIN — Medication 50 MILLILITER(S): at 16:43

## 2023-01-01 RX ADMIN — PANTOPRAZOLE SODIUM 40 MILLIGRAM(S): 20 TABLET, DELAYED RELEASE ORAL at 12:26

## 2023-01-01 RX ADMIN — TAMSULOSIN HYDROCHLORIDE 0.4 MILLIGRAM(S): 0.4 CAPSULE ORAL at 22:14

## 2023-01-01 RX ADMIN — BUPROPION HYDROCHLORIDE 150 MILLIGRAM(S): 150 TABLET, EXTENDED RELEASE ORAL at 16:10

## 2023-01-01 RX ADMIN — Medication 50 MILLILITER(S): at 16:09

## 2023-01-01 RX ADMIN — Medication 1 GRAM(S): at 17:23

## 2023-01-01 RX ADMIN — ALBUTEROL 2 PUFF(S): 90 AEROSOL, METERED ORAL at 08:05

## 2023-01-01 RX ADMIN — ALBUTEROL 2 PUFF(S): 90 AEROSOL, METERED ORAL at 02:23

## 2023-01-01 RX ADMIN — Medication 1 GRAM(S): at 17:02

## 2023-01-01 RX ADMIN — DIVALPROEX SODIUM 500 MILLIGRAM(S): 500 TABLET, DELAYED RELEASE ORAL at 06:48

## 2023-01-01 RX ADMIN — HEPARIN SODIUM 5000 UNIT(S): 5000 INJECTION INTRAVENOUS; SUBCUTANEOUS at 05:27

## 2023-01-01 RX ADMIN — SODIUM CHLORIDE 50 MILLILITER(S): 9 INJECTION, SOLUTION INTRAVENOUS at 08:31

## 2023-01-01 RX ADMIN — Medication 95 MICROGRAM(S): at 21:49

## 2023-01-01 RX ADMIN — BUPROPION HYDROCHLORIDE 150 MILLIGRAM(S): 150 TABLET, EXTENDED RELEASE ORAL at 12:05

## 2023-01-01 RX ADMIN — Medication 1 GRAM(S): at 05:26

## 2023-01-01 RX ADMIN — PANTOPRAZOLE SODIUM 40 MILLIGRAM(S): 20 TABLET, DELAYED RELEASE ORAL at 06:05

## 2023-01-01 RX ADMIN — ALBUTEROL 2 PUFF(S): 90 AEROSOL, METERED ORAL at 14:16

## 2023-01-01 RX ADMIN — PANTOPRAZOLE SODIUM 40 MILLIGRAM(S): 20 TABLET, DELAYED RELEASE ORAL at 17:50

## 2023-01-01 RX ADMIN — Medication 50 MILLIGRAM(S): at 22:21

## 2023-01-01 RX ADMIN — ALBUTEROL 2 PUFF(S): 90 AEROSOL, METERED ORAL at 15:25

## 2023-01-01 RX ADMIN — ALBUTEROL 2 PUFF(S): 90 AEROSOL, METERED ORAL at 14:39

## 2023-01-01 RX ADMIN — ALBUTEROL 2 PUFF(S): 90 AEROSOL, METERED ORAL at 16:30

## 2023-01-01 RX ADMIN — Medication 40 MILLIEQUIVALENT(S): at 13:18

## 2023-01-01 RX ADMIN — DORZOLAMIDE HYDROCHLORIDE 1 DROP(S): 20 SOLUTION/ DROPS OPHTHALMIC at 08:15

## 2023-01-01 RX ADMIN — ALBUTEROL 2 PUFF(S): 90 AEROSOL, METERED ORAL at 21:47

## 2023-01-01 RX ADMIN — Medication 1 APPLICATION(S): at 21:26

## 2023-01-01 RX ADMIN — Medication 3.95 MICROGRAM(S)/KG/MIN: at 04:56

## 2023-01-01 RX ADMIN — FINASTERIDE 5 MILLIGRAM(S): 5 TABLET, FILM COATED ORAL at 12:31

## 2023-01-01 RX ADMIN — CEFEPIME 100 MILLIGRAM(S): 1 INJECTION, POWDER, FOR SOLUTION INTRAMUSCULAR; INTRAVENOUS at 16:48

## 2023-01-01 RX ADMIN — ALBUTEROL 2 PUFF(S): 90 AEROSOL, METERED ORAL at 08:41

## 2023-01-01 RX ADMIN — SODIUM CHLORIDE 1000 MILLILITER(S): 9 INJECTION INTRAMUSCULAR; INTRAVENOUS; SUBCUTANEOUS at 19:00

## 2023-01-01 RX ADMIN — Medication 1 GRAM(S): at 12:22

## 2023-01-01 RX ADMIN — PANTOPRAZOLE SODIUM 40 MILLIGRAM(S): 20 TABLET, DELAYED RELEASE ORAL at 17:45

## 2023-01-01 RX ADMIN — ONDANSETRON 4 MILLIGRAM(S): 8 TABLET, FILM COATED ORAL at 17:10

## 2023-01-01 RX ADMIN — NYSTATIN CREAM 1 APPLICATION(S): 100000 CREAM TOPICAL at 06:23

## 2023-01-01 RX ADMIN — Medication 50 MILLILITER(S): at 11:17

## 2023-01-01 RX ADMIN — Medication 1 GRAM(S): at 00:07

## 2023-01-01 RX ADMIN — CEFEPIME 100 MILLIGRAM(S): 1 INJECTION, POWDER, FOR SOLUTION INTRAMUSCULAR; INTRAVENOUS at 20:17

## 2023-01-01 RX ADMIN — PANTOPRAZOLE SODIUM 40 MILLIGRAM(S): 20 TABLET, DELAYED RELEASE ORAL at 10:28

## 2023-01-01 RX ADMIN — Medication 5 MILLIGRAM(S): at 12:57

## 2023-01-01 RX ADMIN — Medication 100 MILLIEQUIVALENT(S): at 08:57

## 2023-01-01 RX ADMIN — Medication 50 MILLILITER(S): at 22:12

## 2023-01-01 RX ADMIN — SODIUM CHLORIDE 500 MILLILITER(S): 9 INJECTION, SOLUTION INTRAVENOUS at 10:46

## 2023-01-01 RX ADMIN — Medication 137 MICROGRAM(S): at 05:57

## 2023-01-01 RX ADMIN — DIVALPROEX SODIUM 500 MILLIGRAM(S): 500 TABLET, DELAYED RELEASE ORAL at 08:20

## 2023-01-01 RX ADMIN — NYSTATIN CREAM 1 APPLICATION(S): 100000 CREAM TOPICAL at 17:58

## 2023-01-01 RX ADMIN — FINASTERIDE 5 MILLIGRAM(S): 5 TABLET, FILM COATED ORAL at 11:29

## 2023-01-01 RX ADMIN — DORZOLAMIDE HYDROCHLORIDE 1 DROP(S): 20 SOLUTION/ DROPS OPHTHALMIC at 21:35

## 2023-01-01 RX ADMIN — ALBUTEROL 2 PUFF(S): 90 AEROSOL, METERED ORAL at 22:08

## 2023-01-01 RX ADMIN — PANTOPRAZOLE SODIUM 40 MILLIGRAM(S): 20 TABLET, DELAYED RELEASE ORAL at 17:35

## 2023-01-01 RX ADMIN — Medication 50 MILLIGRAM(S): at 22:43

## 2023-01-01 RX ADMIN — Medication 1 GRAM(S): at 11:25

## 2023-01-01 RX ADMIN — Medication 1 APPLICATION(S): at 22:10

## 2023-01-01 RX ADMIN — Medication 1 GRAM(S): at 23:17

## 2023-01-01 RX ADMIN — ALBUTEROL 2 PUFF(S): 90 AEROSOL, METERED ORAL at 08:16

## 2023-01-01 RX ADMIN — ALBUTEROL 2 PUFF(S): 90 AEROSOL, METERED ORAL at 21:17

## 2023-01-01 RX ADMIN — ALBUTEROL 2 PUFF(S): 90 AEROSOL, METERED ORAL at 22:22

## 2023-01-01 RX ADMIN — ALBUTEROL 2 PUFF(S): 90 AEROSOL, METERED ORAL at 16:18

## 2023-01-01 RX ADMIN — SODIUM CHLORIDE 3 MILLILITER(S): 9 INJECTION INTRAMUSCULAR; INTRAVENOUS; SUBCUTANEOUS at 14:55

## 2023-01-01 RX ADMIN — ALBUTEROL 2 PUFF(S): 90 AEROSOL, METERED ORAL at 20:52

## 2023-01-01 RX ADMIN — Medication 1300 MILLIGRAM(S): at 05:57

## 2023-01-01 RX ADMIN — ROBINUL 0.2 MILLIGRAM(S): 0.2 INJECTION INTRAMUSCULAR; INTRAVENOUS at 05:10

## 2023-01-01 RX ADMIN — Medication 650 MILLIGRAM(S): at 18:39

## 2023-01-01 RX ADMIN — DIVALPROEX SODIUM 500 MILLIGRAM(S): 500 TABLET, DELAYED RELEASE ORAL at 18:05

## 2023-01-01 RX ADMIN — Medication 1300 MILLIGRAM(S): at 06:11

## 2023-03-01 NOTE — ED ADULT NURSE NOTE - NSSEPSISSUSPECTED_ED_A_ED
In an effort to ensure that our patients [employer], a Team Member has reviewed your chart and identified an opportunity to provide the best care possible. An attempt was made to discuss or schedule overdue Preventive or Disease Management screening.     The Outcome was Contact was made, care gap was discussed - see further documentation. Care Gaps include Immunizations.    Patient notified due for flu vaccine will think about it    Yes

## 2023-05-28 NOTE — ED PROVIDER NOTE - NSICDXPASTMEDICALHX_GEN_ALL_CORE_FT
PAST MEDICAL HISTORY:  Ambulatory dysfunction     Anemia     Bipolar disorder     BPH (benign prostatic hyperplasia)     Chronic diastolic congestive heart failure     Chronic leg pain     Chronic obstructive pulmonary disease (COPD)     CKD (chronic kidney disease)     History of BPH     HLD (hyperlipidemia)     Hyperlipidemia     Hypothyroid     Lymphedema

## 2023-05-28 NOTE — ED PROVIDER NOTE - OBJECTIVE STATEMENT
63-year-old male with past medical history hypertension, hyperlipidemia, anemia, CHF, seizures, bipolar disorder, COPD, BPH presents with vomiting x3 to 4 days.  Patient reports diffuse abdominal discomfort and multiple episodes of vomiting, states he is not passing gas or having bowel movements.  Patient's alert and oriented to self only, history limited from patient, history obtained from chart review and nursing facility paperwork.  Denies any additional complaints.

## 2023-05-28 NOTE — ED PROVIDER NOTE - PROGRESS NOTE DETAILS
Lucks-DO: CT without evidence of obstruction. BP 80s/60s, Creatinine 4.98. Additional fluids ordered. ICU consulted, pending eval/recs. Signout to Dr. Richmond pending ICU eval/recs/reassessment. Basilio POSADA: Received sign out from Dr. Cisse.  Pt accepted  to ICU.  I had a detailed discussion with the patient and/or guardian regarding the historical points, exam findings, and any diagnostic results supporting the admit diagnosis.

## 2023-05-28 NOTE — ED ADULT TRIAGE NOTE - CHIEF COMPLAINT QUOTE
[facility] been vomiting brown liquid x 4 days. patient has decubiti to sacral area

## 2023-05-28 NOTE — ED PROVIDER NOTE - CARE PLAN
Principal Discharge DX:	Vomiting  Secondary Diagnosis:	Acute kidney injury superimposed on CKD  Secondary Diagnosis:	Hypotension   1

## 2023-05-28 NOTE — ED ADULT NURSE NOTE - ISOLATION TYPE:
None Eucrisa Counseling: Patient may experience a mild burning sensation during topical application. Eucrisa is not approved in children less than 2 years of age.

## 2023-05-28 NOTE — ED PROVIDER NOTE - PHYSICAL EXAMINATION
CONSTITUTIONAL: non-toxic, NAD  SKIN: no rash, no petechiae.  EYES: pink conjunctiva, anicteric  ENT: tongue and uvular midline, no exudates, dry mucous membranes  NECK: Supple; no meningismus, no JVD  CARD: RRR, no murmurs, equal radial pulses bilaterally 2+  RESP: CTAB, no respiratory distress  ABD: Soft, diffusely tender, mildly distended, no peritoneal signs, no CVA tenderness. Viera in place.   EXT: Normal ROM x4. No edema. Chronic dermatitis to BLE.   NEURO: Alert, oriented to self only. Neuro exam nonfocal.

## 2023-05-28 NOTE — ED PROVIDER NOTE - INPATIENT RESIDENT/ACP NOTIFIED
LOV 3/21/18.  Pt requesting a script for depression med.  Advised PCP office needs to address. Doesn't need any meds refilled at this time.  
Patient, Autumn Quintanabeba calling for medication refill. Medication(s) set up as pending orders from medication list.    Caller has been advised that their call does not guarantee an immediate refill. This refill will be reviewed within 24-72 hours by a qualified provider who will determine whether he or she can refill the medication.    Patient has contacted the pharmacy?  Yes    Patient advised he or she will receive a call back.    Additional information:     Patient has 2 weeks worth of medication.    Patient is also requesting a prescription for depression.     Patient’s preferred pharmacy has been noted and populated.       [facility] - [address]  [address]  [facility] [address]  Phone: [phone] Fax: [phone]      
ICU resident

## 2023-05-28 NOTE — ED ADULT NURSE NOTE - OBJECTIVE STATEMENT
Pt a&ox3, in ED for nausea x 3 days, denies pain, Viera & oxygen NC noted on admission, oxygen level stable on RA, no SOB, unlabored breathing, equal rise & fall, able to speak in full sentences, nausea without vomiting, denies diarrhea. PAD noted to lower extremities.

## 2023-05-28 NOTE — ED PROVIDER NOTE - CLINICAL SUMMARY MEDICAL DECISION MAKING FREE TEXT BOX
Lucila: 63-year-old male with past medical history hypertension, hyperlipidemia, anemia, CHF, seizures, bipolar disorder, COPD, BPH presents with vomiting x3 to 4 days.  Patient reports diffuse abdominal discomfort and multiple episodes of vomiting, states he is not passing gas or having bowel movements.  Patient's alert and oriented to self only, history limited from patient, history obtained from chart review and nursing facility paperwork. Concern for obstruction, ddx also includes but not limited to infectious/metabolic cause. Patient hypotensive to 80s/60s on arrival. Will obtain labs, imaging, provide supportive treatment, admit.

## 2023-05-28 NOTE — ED ADULT NURSE NOTE - CHIEF COMPLAINT QUOTE
lennox from [facility] with c/o been vomiting brown liquid x 4 days. patient has decubiti to sacral area

## 2023-05-28 NOTE — ED ADULT NURSE NOTE - NSFALLUNIVINTERV_ED_ALL_ED
Bed/Stretcher in lowest position, wheels locked, appropriate side rails in place/Call bell, personal items and telephone in reach/Instruct patient to call for assistance before getting out of bed/chair/stretcher/Non-slip footwear applied when patient is off stretcher/[place] to call system/Physically safe environment - no spills, clutter or unnecessary equipment/Purposeful proactive rounding/Room/bathroom lighting operational, light cord in reach

## 2023-05-29 PROBLEM — I89.0 LYMPHEDEMA, NOT ELSEWHERE CLASSIFIED: Chronic | Status: ACTIVE | Noted: 2022-01-01

## 2023-05-29 PROBLEM — I50.32 CHRONIC DIASTOLIC (CONGESTIVE) HEART FAILURE: Chronic | Status: ACTIVE | Noted: 2022-01-01

## 2023-05-29 PROBLEM — M79.606 PAIN IN LEG, UNSPECIFIED: Chronic | Status: ACTIVE | Noted: 2022-01-01

## 2023-05-29 NOTE — H&P ADULT - NSHPLABSRESULTS_GEN_ALL_CORE
LABS:                        9.9    7.73  )-----------( 250      ( 28 May 2023 21:02 )             31.1         132<L>  |  99  |  103<H>  ----------------------------<  96  3.1<L>   |  20<L>  |  4.98<H>    Ca    10.2      28 May 2023 21:02    TPro  6.0  /  Alb  1.8<L>  /  TBili  0.2  /  DBili  x   /  AST  5<L>  /  ALT  <6<L>  /  AlkPhos  104      PT/INR - ( 28 May 2023 21:02 )   PT: 11.4 sec;   INR: 0.96 ratio         PTT - ( 28 May 2023 21:02 )  PTT:32.5 sec  Urinalysis Basic - ( 28 May 2023 21:02 )    Color: Pale Yellow / Appearance: Slightly Turbid / S.010 / pH: x  Gluc: x / Ketone: Trace  / Bili: Negative / Urobili: Negative   Blood: x / Protein: 100 mg/dL / Nitrite: Negative   Leuk Esterase: Moderate / RBC: 10-25 /HPF / WBC 26-50 /HPF   Sq Epi: x / Non Sq Epi: x / Bacteria: TNTC /HPF      LIVER FUNCTIONS - ( 28 May 2023 21:02 )  Alb: 1.8 g/dL / Pro: 6.0 g/dL / ALK PHOS: 104 U/L / ALT: <6 U/L DA / AST: 5 U/L / GGT: x           Lactate, Blood: 0.9 mmol/L (23 @ 21:02)    < from: CT Head No Cont (23 @ 22:33) >    IMPRESSION:  No acute intracranial hemorrhage, mass effect, or midline shift.  Paranasal sinus mucosal disease.  The extent of ventricular and sulcal prominence appears increased   compared with 2022, indeterminate clinical significance.    < end of copied text >    < from: CT Abdomen and Pelvis No Cont (23 @ 22:34) >    IMPRESSION:  1. No bowel obstruction or inflammation.  2. Circumferential wall thickening of the distal esophagus which could be   due to an esophagitis.  3. Circumferential urinary bladder wall thickening which could be due to   underdistention versus a cystitis. Correlate with urinalysis.    < end of copied text >

## 2023-05-29 NOTE — CONSULT NOTE ADULT - SUBJECTIVE AND OBJECTIVE BOX
NEPHROLOGY MEDICAL CARE, [facility] - Dr. Taj Valenzuela/ Dr. Hazel Solis/ Dr. Cosmo Root/ Dr. Fang Gifford    Date of Service: 23 @ 09:55    Patient was seen and examined at bedside.     Consultation requested by:  Garfield Barcenas    Reason for Consult:     HPI:  64 y/o M, A&Ox3, bedbound, chronic daley catheter with CKD (baseline creat 3.8), COPD, CHFpEF, BPH, PVD, Lymphedema, decubitus ulcers (Sacrum/gluteal) , Anemia, poly-neuropathy, poly-arthritis, hypothyroidism, seizures and Bipolar disorder was BIB EMS from [place] for abdominal pain, [place]. Mr. Rhoades states that he developed acute onset sharp abdominal pain 4 days ago a/w "brown" emesis (witnessed by EMS). Unable to tolerate food/drink x 4 days. Having normal bowel movements, last this morning . Denies any fevers. NH paperwork states concern for obstruction. Previous admission in Dec 2022, pt had SHEYLA due to ATN requiring Hemodialysis x 2 session then had renal recovery with scr improving to 3.7mg/dL. Pt on admission had 4.9mg/dL and improved to 4.46mg/dl this morning after receiving 3L of fluid on admission. Patient didn't receive any iv contrast on admission.       PMH:   No pertinent past medical history    Hypothyroid    Hyperlipidemia    Schizophrenia    Schizoaffective disorder    Ambulatory dysfunction    Anemia    History of BPH    CKD (chronic kidney disease)    Chronic obstructive pulmonary disease (COPD)    Bipolar disorder    Bipolar disorder    Mood disorder    HLD (hyperlipidemia)    BPH (benign prostatic hyperplasia)    Anemia    Chronic diastolic congestive heart failure    Lymphedema    Chronic leg pain        PSH:   Chronic daley's cath        FAMILY HISTORY:  N/c    Social History:  non-smoker/ non-alcoholic     Home Meds:  Home Medications:  acetaminophen 325 mg oral tablet: 2 tab(s) orally every 6 hours, As needed, Mild Pain (1 - 3), Moderate Pain (4 - 6) (29 May 2023 01:34)  buPROPion 150 mg/24 hours (XL) oral tablet, extended release: 1 tab(s) orally once a day (29 May 2023 01:34)  calcitriol 0.25 mcg oral capsule: 1 cap(s) orally once a day (29 May 2023 01:34)  Depakote  mg oral tablet, extended release: 2 orally 2 times a day (29 May 2023 01:41)  ferrous sulfate 325 mg (65 mg elemental iron) oral delayed release tablet: 1 orally once a day (29 May 2023 01:41)  folic acid 1 mg oral tablet: 1 tab(s) orally once a day (29 May 2023 01:34)  ipratropium-albuterol 0.5 mg-2.5 mg/3 mL inhalation solution: 3 milliliter(s) inhaled every 6 hours (29 May 2023 01:34)  Proscar 5 mg oral tablet: 1 tab(s) orally once a day (29 May 2023 01:34)  Senna 8.6 mg oral tablet: 1 orally once a day (29 May 2023 01:41)  sevelamer carbonate 800 mg oral tablet: 1 orally 3 times a day (29 May 2023 01:41)  Simbrinza 1%- 0.2% ophthalmic suspension: 1 drop(s) to each affected eye 2 times a day Right eye (29 May 2023 01:34)  Synthroid 125 mcg (0.125 mg) oral tablet: 1 tab(s) orally once a day (29 May 2023 01:34)  tamsulosin 0.4 mg oral capsule: 1 cap(s) orally once a day (29 May 2023 01:34)  traZODone 50 mg oral tablet: 1 orally once a day (at bedtime) (29 May 2023 01:41)  Vitamin D3 25 mcg (1000 intl units) oral tablet: 1 tab(s) orally once a day (29 May 2023 01:34)      Allergies:  Allergies    No Known Allergies    Intolerances        REVIEW OF SYSTEMS:  unable to obtain due to poor historian    Vital Signs Last 24 Hrs  T(C): 35.5 (29 May 2023 07:00), Max: 36.8 (28 May 2023 19:50)  T(F): 95.9 (29 May 2023 07:00), Max: 98.2 (28 May 2023 19:50)  HR: 65 (29 May 2023 09:00) (65 - 86)  BP: 102/60 (29 May 2023 09:00) (88/62 - 122/73)  BP(mean): 70 (29 May 2023 09:00) (60 - 84)  RR: 15 (29 May 2023 09:00) (11 - 23)  SpO2: 100% (29 May 2023 09:00) (96% - 100%)    Parameters below as of 29 May 2023 02:45  Patient On (Oxygen Delivery Method): room air         @ 07:  -   @ 07:00  --------------------------------------------------------  IN: 100 mL / OUT: 450 mL / NET: -350 mL     @ 07:01  -   @ 09:55  --------------------------------------------------------  IN: 200 mL / OUT: 55 mL / NET: 145 mL          PHYSICAL EXAM:  General: No acute respiratory distress.  Eyes: conjunctiva and sclera clear  ENMT: Atraumatic, Normocephalic, supple, No JVD present. Moist mucous membranes  Respiratory: Bilateral poor air entry; No rales, rhonchi, wheezing  Cardiovascular: S1S2+; no m/r/g  Gastrointestinal: Soft, Non-tender, Nondistended; Bowel sounds present, no hepatosplenomegaly.   : daley's cath with clear urine.  Neuro:  Awake   Ext:  2+ Peripheral Pulses and pedal edema, No Cyanosis  Skin: No visible rashes        LABS:                        8.9    7.40  )-----------( 223      ( 29 May 2023 04:50 )             28.3     05-29    133<L>  |  101  |  95<H>  ----------------------------<  86  2.8<LL>   |  20<L>  |  4.46<H>    Ca    9.9      29 May 2023 04:50  Phos  3.9     -  Mg     2.9         TPro  5.3<L>  /  Alb  1.7<L>  /  TBili  0.2  /  DBili  x   /  AST  6<L>  /  ALT  <6<L>  /  AlkPhos  91      PT/INR - ( 28 May 2023 21:02 )   PT: 11.4 sec;   INR: 0.96 ratio         PTT - ( 28 May 2023 21:02 )  PTT:32.5 sec  Urinalysis Basic - ( 28 May 2023 21:02 )    Color: Pale Yellow / Appearance: Slightly Turbid / S.010 / pH: x  Gluc: x / Ketone: Trace  / Bili: Negative / Urobili: Negative   Blood: x / Protein: 100 mg/dL / Nitrite: Negative   Leuk Esterase: Moderate / RBC: 10-25 /HPF / WBC 26-50 /HPF   Sq Epi: x / Non Sq Epi: x / Bacteria: TNTC /HPF      Magnesium, Serum: 2.9 mg/dL *H* ( @ 04:50)  Phosphorus Level, Serum: 3.9 mg/dL ( @ 04:50)    Urine studies      Medications:  MEDICATIONS  (STANDING):  albumin human  5% IVPB 250 milliLiter(s) IV Intermittent every 2 hours  albuterol    90 MICROgram(s) HFA Inhaler 2 Puff(s) Inhalation every 6 hours  cefepime   IVPB 1000 milliGRAM(s) IV Intermittent every 24 hours  chlorhexidine 2% Cloths 1 Application(s) Topical <User Schedule>  dorzolamide 2% Ophthalmic Solution 1 Drop(s) Right EYE <User Schedule>  finasteride 5 milliGRAM(s) Oral daily  heparin   Injectable 5000 Unit(s) SubCutaneous every 8 hours  levothyroxine Injectable 95 MICROGram(s) IV Push at bedtime  sucralfate suspension 1 Gram(s) Oral four times a day  tamsulosin 0.4 milliGRAM(s) Oral at bedtime  traZODone 50 milliGRAM(s) Oral at bedtime  valproate sodium  IVPB 500 milliGRAM(s) IV Intermittent two times a day  vitamin A &amp; D Ointment 1 Application(s) Topical at bedtime    MEDICATIONS  (PRN):  metoclopramide Injectable 5 milliGRAM(s) IV Push every 6 hours PRN nausea/vomit

## 2023-05-29 NOTE — H&P ADULT - NSHPREVIEWOFSYSTEMS_GEN_ALL_CORE
REVIEW OF SYSTEMS:  CONSTITUTIONAL: No fever, weight loss, or fatigue  RESPIRATORY: No cough, wheezing, chills or hemoptysis; No shortness of breath  CARDIOVASCULAR: No chest pain, palpitations, dizziness, or leg swelling  GASTROINTESTINAL: + abdominal pain. + nausea, + vomiting, ? hematemesis; No diarrhea or constipation. No melena or hematochezia.  GENITOURINARY: No dysuria or hematuria, urinary frequency + chronic daley  NEUROLOGICAL: No headaches, memory loss, loss of strength, numbness, or tremors  SKIN: No itching, burning, rashes, or lesions

## 2023-05-29 NOTE — CHART NOTE - NSCHARTNOTEFT_GEN_A_CORE
EVENT:     SUBJECTIVE:    OBJECTIVE:  Vital Signs Last 24 Hrs  T(C): 36.6 (29 May 2023 21:23), Max: 36.6 (29 May 2023 21:23)  T(F): 97.8 (29 May 2023 21:23), Max: 97.8 (29 May 2023 21:23)  HR: 84 (29 May 2023 21:23) (64 - 84)  BP: 107/53 (29 May 2023 21:23) (77/41 - 122/73)  BP(mean): 68 (29 May 2023 17:15) (50 - 84)  RR: 20 (29 May 2023 21:23) (11 - 24)  SpO2: 100% (29 May 2023 21:23) (95% - 100%)    Parameters below as of 29 May 2023 21:23  Patient On (Oxygen Delivery Method): room air        PHYSICAL EXAM:  Neuro: Awake and alert, oriented to person, place, and time  Cardiovascular: + S1, S2, no murmurs, rubs, or bruits  Respiratory: clear to auscultation bilaterally with good air entry   GI: Abdomen soft, non-tender, bowel sounds present   : Non distended;   Skin: warm and dry; no rash      LABS:                        8.4    6.46  )-----------( 211      ( 29 May 2023 22:40 )             27.1     05-29    135  |  108  |  97<H>  ----------------------------<  86  3.8   |  17<L>  |  4.12<H>    Ca    9.8      29 May 2023 13:53  Phos  3.9     05-29  Mg     2.9     05-29    TPro  5.1<L>  /  Alb  1.9<L>  /  TBili  0.2  /  DBili  x   /  AST  5<L>  /  ALT  6<L>  /  AlkPhos  79  05-29        EKG:   IMAGING:    ASSESSMENT/PROBLEM    PLAN:     FOLLOW UP / RESULT: EVENT: Repeat CBC reviewed. Hgb of 8.4. Previous was 7.7. SQ heparin reinstated for DVT ppx. Continue to trend CBC and monitor for any s/s of active bleeding.

## 2023-05-29 NOTE — H&P ADULT - CRITICAL CARE ATTENDING COMMENT
case discussed with residents and patient    64 y/o M, A&Ox3, bedbound, chronic daley, CKD COPD, CHFpEF, BPH, PVD, Lymphedema, decubitus ulcers (Sacrum/gluteal) , Anemia, poly-neuropathy, poly-arthritis, hypothyroidism, seizures and Bipolar disorder was BIB EMS from [place] for abdominal pain, [place]. Admit to icu for hypotension. Patient endorses BLOODY EMESIS x 4 days.  edema legs    PLAN:    -----------------CNS:------------------  #Bipolar  #Seizure  CT head shows paranasal sinus disease, ventricular and sulcal prominences appear increased from prior; indeterminant clinical significance  cont home meds

## 2023-05-29 NOTE — CHART NOTE - NSCHARTNOTEFT_GEN_A_CORE
Talked to Ms Choe (who is in contact list in NH papers) and provided update. Answered all questions. Talked to sister, Daija (who is in contact list in NH papers), and provided update. Answered all questions.

## 2023-05-29 NOTE — PROGRESS NOTE ADULT - SUBJECTIVE AND OBJECTIVE BOX
INTERVAL HPI/OVERNIGHT EVENTS:  Seen and examined at bedside. No acute events overnight.    PRESSORS: [  ] YES [ X ] NO  WHICH:    Antimicrobial:  cefepime   IVPB 1000 milliGRAM(s) IV Intermittent every 24 hours    Cardiovascular:    Pulmonary:  albuterol    90 MICROgram(s) HFA Inhaler 2 Puff(s) Inhalation every 6 hours  tiotropium 2.5 MICROgram(s) Inhaler 2 Puff(s) Inhalation daily    Hematalogic:    Other:  albumin human  5% IVPB 250 milliLiter(s) IV Intermittent every 2 hours  chlorhexidine 2% Cloths 1 Application(s) Topical <User Schedule>  dorzolamide 2% Ophthalmic Solution 1 Drop(s) Right EYE <User Schedule>  levothyroxine Injectable 95 MICROGram(s) IV Push at bedtime  ondansetron Injectable 4 milliGRAM(s) IV Push every 6 hours PRN  potassium chloride  10 mEq/100 mL IVPB 10 milliEquivalent(s) IV Intermittent every 1 hour  sucralfate suspension 1 Gram(s) Oral four times a day  valproate sodium  IVPB 500 milliGRAM(s) IV Intermittent two times a day  vitamin A &amp; D Ointment 1 Application(s) Topical at bedtime    albumin human  5% IVPB 250 milliLiter(s) IV Intermittent every 2 hours  albuterol    90 MICROgram(s) HFA Inhaler 2 Puff(s) Inhalation every 6 hours  cefepime   IVPB 1000 milliGRAM(s) IV Intermittent every 24 hours  chlorhexidine 2% Cloths 1 Application(s) Topical <User Schedule>  dorzolamide 2% Ophthalmic Solution 1 Drop(s) Right EYE <User Schedule>  levothyroxine Injectable 95 MICROGram(s) IV Push at bedtime  ondansetron Injectable 4 milliGRAM(s) IV Push every 6 hours PRN  potassium chloride  10 mEq/100 mL IVPB 10 milliEquivalent(s) IV Intermittent every 1 hour  sucralfate suspension 1 Gram(s) Oral four times a day  tiotropium 2.5 MICROgram(s) Inhaler 2 Puff(s) Inhalation daily  valproate sodium  IVPB 500 milliGRAM(s) IV Intermittent two times a day  vitamin A &amp; D Ointment 1 Application(s) Topical at bedtime    Drug Dosing Weight  Height (cm): 172.7 (28 May 2023 19:50)  Weight (kg): 83.9 (28 May 2023 19:50)  BMI (kg/m2): 28.1 (28 May 2023 19:50)  BSA (m2): 1.98 (28 May 2023 19:50)    CENTRAL LINE: [ ] YES [ ] NO  LOCATION:   DATE INSERTED:  REMOVE: [ ] YES [ ] NO  EXPLAIN:    HATFIELD: [ ] YES [ ] NO    DATE INSERTED:  REMOVE:  [ ] YES [ ] NO  EXPLAIN:    A-LINE:  [ ] YES [ ] NO  LOCATION:   DATE INSERTED:  REMOVE:  [ ] YES [ ] NO  EXPLAIN:    PMH -reviewed admission note, no change since admission  PAST MEDICAL & SURGICAL HISTORY:  Hypothyroid      Hyperlipidemia      Ambulatory dysfunction      Anemia      History of BPH      CKD (chronic kidney disease)      Chronic obstructive pulmonary disease (COPD)      Bipolar disorder      HLD (hyperlipidemia)      BPH (benign prostatic hyperplasia)      Chronic diastolic congestive heart failure      Lymphedema      Chronic leg pain      No significant past surgical history          ICU Vital Signs Last 24 Hrs  T(C): 35.6 (29 May 2023 02:30), Max: 36.8 (28 May 2023 19:50)  T(F): 96 (29 May 2023 02:30), Max: 98.2 (28 May 2023 19:50)  HR: 68 (29 May 2023 06:00) (66 - 86)  BP: 122/73 (29 May 2023 06:00) (88/62 - 122/73)  BP(mean): 83 (29 May 2023 06:00) (60 - 84)  ABP: --  ABP(mean): --  RR: 20 (29 May 2023 06:00) (11 - 20)  SpO2: 100% (29 May 2023 06:00) (96% - 100%)    O2 Parameters below as of 29 May 2023 02:45  Patient On (Oxygen Delivery Method): room air                   @ 07:01  -   @ 07:00  --------------------------------------------------------  IN: 0 mL / OUT: 400 mL / NET: -400 mL            PHYSICAL EXAM:  GENERAL: NAD, well-groomed, well-developed  HEAD:  Atraumatic, Normocephalic  EYES: EOMI, PERRLA, conjunctiva and sclera clear  ENMT: No tonsillar erythema, exudates, or enlargement; Moist mucous membranes, No lesions  NECK: Supple, normal appearance, No JVD; Trachea midline  NERVOUS SYSTEM:  Alert & Oriented X3,  Motor Strength 5/5 B/L upper and lower extremities  CHEST/LUNG: Clear to auscultation bilaterally; No rales, rhonchi, wheezing   HEART: Regular rate and rhythm; No murmurs, rubs, or gallops  ABDOMEN: Soft, Nontender, Nondistended; Bowel sounds present  EXTREMITIES:  2+ Peripheral Pulses, No clubbing, cyanosis, or edema  LYMPH: No lymphadenopathy noted  SKIN: No rashes or lesions;  Good capillary refill      LABS:  CBC Full  -  ( 29 May 2023 04:50 )  WBC Count : 7.40 K/uL  RBC Count : 2.89 M/uL  Hemoglobin : 8.9 g/dL  Hematocrit : 28.3 %  Platelet Count - Automated : 223 K/uL  Mean Cell Volume : 97.9 fl  Mean Cell Hemoglobin : 30.8 pg  Mean Cell Hemoglobin Concentration : 31.4 gm/dL  Auto Neutrophil # : x  Auto Lymphocyte # : x  Auto Monocyte # : x  Auto Eosinophil # : x  Auto Basophil # : x  Auto Neutrophil % : x  Auto Lymphocyte % : x  Auto Monocyte % : x  Auto Eosinophil % : x  Auto Basophil % : x    -    133<L>  |  101  |  95<H>  ----------------------------<  86  2.8<LL>   |  20<L>  |  4.46<H>    Ca    9.9      29 May 2023 04:50  Phos  3.9       Mg     2.9         TPro  5.3<L>  /  Alb  1.7<L>  /  TBili  0.2  /  DBili  x   /  AST  6<L>  /  ALT  <6<L>  /  AlkPhos  91  -    PT/INR - ( 28 May 2023 21:02 )   PT: 11.4 sec;   INR: 0.96 ratio         PTT - ( 28 May 2023 21:02 )  PTT:32.5 sec  Urinalysis Basic - ( 28 May 2023 21:02 )    Color: Pale Yellow / Appearance: Slightly Turbid / S.010 / pH: x  Gluc: x / Ketone: Trace  / Bili: Negative / Urobili: Negative   Blood: x / Protein: 100 mg/dL / Nitrite: Negative   Leuk Esterase: Moderate / RBC: 10-25 /HPF / WBC 26-50 /HPF   Sq Epi: x / Non Sq Epi: x / Bacteria: TNTC /HPF          RADIOLOGY & ADDITIONAL STUDIES REVIEWED:  ***    [ ]GOALS OF CARE DISCUSSION WITH PATIENT/FAMILY/PROXY:    CRITICAL CARE TIME SPENT: 35 minutes INTERVAL HPI/OVERNIGHT EVENTS:  Seen and examined at bedside. No acute events overnight. Patient did not vomit. He is only nauseous. He wants to go home.    PRESSORS: [  ] YES [ X ] NO  WHICH:    Antimicrobial:  cefepime   IVPB 1000 milliGRAM(s) IV Intermittent every 24 hours    Cardiovascular:    Pulmonary:  albuterol    90 MICROgram(s) HFA Inhaler 2 Puff(s) Inhalation every 6 hours  tiotropium 2.5 MICROgram(s) Inhaler 2 Puff(s) Inhalation daily    Hematalogic:    Other:  albumin human  5% IVPB 250 milliLiter(s) IV Intermittent every 2 hours  chlorhexidine 2% Cloths 1 Application(s) Topical <User Schedule>  dorzolamide 2% Ophthalmic Solution 1 Drop(s) Right EYE <User Schedule>  levothyroxine Injectable 95 MICROGram(s) IV Push at bedtime  ondansetron Injectable 4 milliGRAM(s) IV Push every 6 hours PRN  potassium chloride  10 mEq/100 mL IVPB 10 milliEquivalent(s) IV Intermittent every 1 hour  sucralfate suspension 1 Gram(s) Oral four times a day  valproate sodium  IVPB 500 milliGRAM(s) IV Intermittent two times a day  vitamin A &amp; D Ointment 1 Application(s) Topical at bedtime    albumin human  5% IVPB 250 milliLiter(s) IV Intermittent every 2 hours  albuterol    90 MICROgram(s) HFA Inhaler 2 Puff(s) Inhalation every 6 hours  cefepime   IVPB 1000 milliGRAM(s) IV Intermittent every 24 hours  chlorhexidine 2% Cloths 1 Application(s) Topical <User Schedule>  dorzolamide 2% Ophthalmic Solution 1 Drop(s) Right EYE <User Schedule>  levothyroxine Injectable 95 MICROGram(s) IV Push at bedtime  ondansetron Injectable 4 milliGRAM(s) IV Push every 6 hours PRN  potassium chloride  10 mEq/100 mL IVPB 10 milliEquivalent(s) IV Intermittent every 1 hour  sucralfate suspension 1 Gram(s) Oral four times a day  tiotropium 2.5 MICROgram(s) Inhaler 2 Puff(s) Inhalation daily  valproate sodium  IVPB 500 milliGRAM(s) IV Intermittent two times a day  vitamin A &amp; D Ointment 1 Application(s) Topical at bedtime    Drug Dosing Weight  Height (cm): 172.7 (28 May 2023 19:50)  Weight (kg): 83.9 (28 May 2023 19:50)  BMI (kg/m2): 28.1 (28 May 2023 19:50)  BSA (m2): 1.98 (28 May 2023 19:50)    CENTRAL LINE: [ ] YES [ ] NO  LOCATION:   DATE INSERTED:  REMOVE: [ ] YES [ ] NO  EXPLAIN:    HATFIELD: [ ] YES [ ] NO    DATE INSERTED:  REMOVE:  [ ] YES [ ] NO  EXPLAIN:    A-LINE:  [ ] YES [ ] NO  LOCATION:   DATE INSERTED:  REMOVE:  [ ] YES [ ] NO  EXPLAIN:    PMH -reviewed admission note, no change since admission  PAST MEDICAL & SURGICAL HISTORY:  Hypothyroid      Hyperlipidemia      Ambulatory dysfunction      Anemia      History of BPH      CKD (chronic kidney disease)      Chronic obstructive pulmonary disease (COPD)      Bipolar disorder      HLD (hyperlipidemia)      BPH (benign prostatic hyperplasia)      Chronic diastolic congestive heart failure      Lymphedema      Chronic leg pain      No significant past surgical history          ICU Vital Signs Last 24 Hrs  T(C): 35.6 (29 May 2023 02:30), Max: 36.8 (28 May 2023 19:50)  T(F): 96 (29 May 2023 02:30), Max: 98.2 (28 May 2023 19:50)  HR: 68 (29 May 2023 06:00) (66 - 86)  BP: 122/73 (29 May 2023 06:00) (88/62 - 122/73)  BP(mean): 83 (29 May 2023 06:00) (60 - 84)  ABP: --  ABP(mean): --  RR: 20 (29 May 2023 06:00) (11 - 20)  SpO2: 100% (29 May 2023 06:00) (96% - 100%)    O2 Parameters below as of 29 May 2023 02:45  Patient On (Oxygen Delivery Method): room air                   @ 07:01  -   @ 07:00  --------------------------------------------------------  IN: 0 mL / OUT: 400 mL / NET: -400 mL            PHYSICAL EXAM:  GENERAL: NAD, well-groomed, well-developed  HEAD:  Atraumatic, Normocephalic  EYES: EOMI, PERRLA, conjunctiva and sclera clear  ENMT: No tonsillar erythema, exudates, or enlargement; Moist mucous membranes, No lesions  NECK: Supple, normal appearance, No JVD; Trachea midline  NERVOUS SYSTEM:  Alert & Oriented X3, no focal neuro deficits  CHEST/LUNG: Clear to auscultation bilaterally; No rales, rhonchi, wheezing   HEART: Regular rate and rhythm; No murmurs, rubs, or gallops  ABDOMEN: Soft, Nontender, Nondistended; Bowel sounds present  EXTREMITIES: 2+ Peripheral Pulses, No clubbing, cyanosis, or edema  SKIN: Chronic bilateral LE changes with hyperpigmentation, sacral pressure injury;  Good capillary refill      LABS:  CBC Full  -  ( 29 May 2023 04:50 )  WBC Count : 7.40 K/uL  RBC Count : 2.89 M/uL  Hemoglobin : 8.9 g/dL  Hematocrit : 28.3 %  Platelet Count - Automated : 223 K/uL  Mean Cell Volume : 97.9 fl  Mean Cell Hemoglobin : 30.8 pg  Mean Cell Hemoglobin Concentration : 31.4 gm/dL  Auto Neutrophil # : x  Auto Lymphocyte # : x  Auto Monocyte # : x  Auto Eosinophil # : x  Auto Basophil # : x  Auto Neutrophil % : x  Auto Lymphocyte % : x  Auto Monocyte % : x  Auto Eosinophil % : x  Auto Basophil % : x        133<L>  |  101  |  95<H>  ----------------------------<  86  2.8<LL>   |  20<L>  |  4.46<H>    Ca    9.9      29 May 2023 04:50  Phos  3.9       Mg     2.9         TPro  5.3<L>  /  Alb  1.7<L>  /  TBili  0.2  /  DBili  x   /  AST  6<L>  /  ALT  <6<L>  /  AlkPhos  91      PT/INR - ( 28 May 2023 21:02 )   PT: 11.4 sec;   INR: 0.96 ratio         PTT - ( 28 May 2023 21:02 )  PTT:32.5 sec  Urinalysis Basic - ( 28 May 2023 21:02 )    Color: Pale Yellow / Appearance: Slightly Turbid / S.010 / pH: x  Gluc: x / Ketone: Trace  / Bili: Negative / Urobili: Negative   Blood: x / Protein: 100 mg/dL / Nitrite: Negative   Leuk Esterase: Moderate / RBC: 10-25 /HPF / WBC 26-50 /HPF   Sq Epi: x / Non Sq Epi: x / Bacteria: TNTC /HPF          RADIOLOGY & ADDITIONAL STUDIES REVIEWED:  ***    [ ]GOALS OF CARE DISCUSSION WITH PATIENT/FAMILY/PROXY:    CRITICAL CARE TIME SPENT: 35 minutes

## 2023-05-29 NOTE — H&P ADULT - HISTORY OF PRESENT ILLNESS
64 y/o M, A&Ox3, bedbound, chronic daley catheter with CKD (baseline creat 3.8), COPD, CHFpEF, BPH, PVD, Lymphedema, decubitus ulcers (Sacrum/gluteal) , Anemia, poly-neuropathy, poly-arthritis, hypothyroidism, seizures and Bipolar disorder was BIB EMS from [place] for abdominal pain, [place]. Mr. Rhoades states that he developed acute onset sharp abdominal pain 4 days ago a/w "brown" emesis (witnessed by EMS). Unable to tolerate food/drink x 4 days. Having normal bowel movements, last this morning 5/28. Denies any fevers. NH paperwork states concern for obstruction. 62 y/o M, A&Ox3, bedbound, chronic daley catheter with CKD (baseline creat 3.8), COPD, CHFpEF, BPH, PVD, Lymphedema, decubitus ulcers (Sacrum/gluteal) , Anemia, poly-neuropathy, poly-arthritis, hypothyroidism, seizures and Bipolar disorder was BIB EMS from [place] for abdominal pain, [place]. Mr. Rhoades states that he developed acute onset sharp abdominal pain 4 days ago a/w "brown" emesis (witnessed by EMS). Unable to tolerate food/drink x 4 days. Having normal bowel movements, last this morning 5/28. Denies any fevers. NH paperwork states concern for obstruction.  Patient endorses BLOODY EMESIS x 4 days upon further questioning.

## 2023-05-29 NOTE — CHART NOTE - NSCHARTNOTEFT_GEN_A_CORE
[ICU course]  63M, from [place], AOx3, walks w/ walker+assistance, chronic FC, h/o obesity, HTN, HLD, PVD, Seizures, CKD4 (base SCr 3.8), anemia, BPH, Lymphedema, hypothyroidism, HFpEF, ?COPD, decubitus ulcer (sacrum/gluteal), polyneuropathy, polyarthritis, bipolar disorder, p/w abd pain a/w "brown” emesis and food intolerance x4d. Admitted for complicated UTI + suspected GIB.    Patient initially had BP of 88/62 but it improved with IVF. Patient admitted to poor oral intake for the past 4 days due to nausea and vomiting.  CT head was negative for acute changes. CT abd showed circumferential wall thickening of distal esophagus. PPI was started.  Patient complained of persistent nausea and Zofran was discontinued to prolonged QTc. Reglan was started. Patient did not have anymore vomiting. Clear liquid diet was started. Hemoglobin was stable around his baseline ~9 and has chronic anemia of renal disease. Hep SC was started for DVT prophylaxis.    Patient had chronic daley from Dec 2022 and he failed TOV. UA was positive. Patient was started on Cefepime. Blood cultures and urine cultures were sent.  Wound care was consulted for sacral pressure ulcer.    Patient presented with BUN/Cr 103/4.98 (Baseline Cr 3.6~3.8). Patient received IVF and SCr has been improving. Dr. Valenzuela nephrologist is on board.    Patient presented with Hypokelamia with K 3.1->2.8, likely due to vomiting and decreased PO intake. It was repleted IV and oral.    Patient is stable for transfer to . GARY Carpenter was informed.    [Things to follow]  - F/u BCx, UCx  - F/u CBC 10pm of 5/29. Hgb good as long as it's stable ~7.7. If stable, resume Hep SC dvt prophylaxis  - GI consult on 5/30  - Advance diet as tolerated

## 2023-05-29 NOTE — PROGRESS NOTE ADULT - ATTENDING COMMENTS
62 y/o M, A&Ox3, walks with walker+assistance, chronic daley(since Dec 2022, failed TOV since), CKD4, HFpEF, BPH, PVD, Lymphedema, decubitus ulcers (Sacrum/gluteal) , Anemia, poly-neuropathy, poly-arthritis, hypothyroidism, seizures and Bipolar disorder was BIB EMS from [place] for abdominal pain, NV. Admit to icu for hypotension. Patient endorses dark colored emesis     Assessment:  1. Sepsis  2. Vomiting  3. Catheter associated complicated UTI  4. SHEYLA on CKD stage 4   5. Seizure disorder  6. HFpEF  7. Pressure injury    Plan:  - No further episodes of vomiting  - Noted with esophageal thickening on CT scan  - Will likely need GI consult  - Start clear liquid diet and monitor response  - Follow up cultures   - IV fluids   - Cont. Antibiotics   - PPI   - Restart BPH medications  - Chronic daley catheter  - DVT prophylaxis  - Transfer to SCU service

## 2023-05-29 NOTE — PROGRESS NOTE ADULT - ASSESSMENT
64 y/o M, A&Ox3, bedbound, chronic daley, CKD COPD, CHFpEF, BPH, PVD, Lymphedema, decubitus ulcers (Sacrum/gluteal) , Anemia, poly-neuropathy, poly-arthritis, hypothyroidism, seizures and Bipolar disorder was BIB EMS from [place] for abdominal pain, NV. Admit to icu for hypotension. Patient endorses BLOODY EMESIS x 4 days.    PLAN:    -----------------CNS:------------------  #Bipolar  #Seizure  -CT head shows paranasal sinus disease, ventricular and sulcal prominences appear increased from prior; indeterminant -clinical significance  -cont home meds  -HOLDING Buproprion and Trazodone while NPO - please resume when off NPO  - buproprion, depakote, trazodone  - seizure precautions    -----------------CVS:------------------  #Hypotension  c/f sepsis though lactate wnl at this time  s/p 3L IVF in ED  - low threshold for pressor initiation (has been given in past)    #HFpEF  last echo with normal EF and diastolic dysfunction and elevated pulm A. systolic pressure 48mmHg (feb 2022)  - f/u repeat echo    -----------------RESPIRATORY:--------  #COPD  No acute exacerbation  albuterol/Duonebs PRN (not on inhaler)     -----------------GI:----------------------  #suspected UPPER GIB  c/o NV w/ brown vomit and bright red blood x 4 days  - CT abd showing circumferential wall thickening of distal esophagus   - trend CBC, maintain active T/S  - Hgb 9.9 -> 8.9 (baseline Hgb ~9)  - PPI 80 push then 40 IVB BID  - carafate QID if tolerates PO  - NPO for now while nauseous  - will place NGT    #Vomiting  - CT abd showing circumferential wall thickening of distal esophagus  - having BMs  - symptomatic control PRN zofran     -----------------ID:-----------------------  #Sepsis 2/2 Complicated UTI   - CT abd showing cystitis and b/l danni-nephric stranding (seen on prior as well)  - Chronic daley replaced in ED 5/29  - f/u UCx, BCx  - cont vanc + cefepime    #Decub ulcers  - chronic sacral gluteal decub ulcers stage 1 and 2  - continue home topical ointment, unable to continue dermaseptin and hydrogel (not on formulary)  - wound care consulted    -----------------RENAL: ---------------  #SHEYLA on CKD   - p/w BUN/Cr 103/4.98, ratio 20  - Baseline Cr 3.5~3.8  - s/p 3 L IVF in ED  - has had HD on past admission for persistent acidosis  - trend Cr  - avoid nephrotoxins    #Hypokalemia  - P/w K 3.1->2.8  - Vomiting and decreased PO intake  - Will replete    -----------------HEME/ONC: ---------------  #Anemia  - P/w Hgb 9.9 -> 8.9 (baseline Hgb ~9)  - normocytic MCV 97.5  - has chronic anemia on ferrous sulfate QD  - will HOLD PO meds for now    -----------------ENDO: ---------------  #Hypothyroid  -cont synthroid 125 > conver 130 synthroid inj QHS    -----------------EXTREMITIES:-------  - maintain 2 large bore peripheral IVs  - Chronic daley - replaced in ED 5/29    -----------------PROPHYLAXIS: -------  - SCD  - GI PPI IB BID    -----------------DISPOSITION--------  ICU 62 y/o M, A&Ox3, walks with walker+assistance, chronic daley, CKD4, ?COPD, ?HFpEF, BPH, PVD, Lymphedema, decubitus ulcers (Sacrum/gluteal) , Anemia, poly-neuropathy, poly-arthritis, hypothyroidism, seizures and Bipolar disorder was BIB EMS from [place] for abdominal pain, NV. Admit to icu for hypotension. Patient endorses BLOODY EMESIS x 4 days.    PLAN:    -----------------CNS:------------------  #Bipolar  #Seizure  -CT head shows paranasal sinus disease, ventricular and sulcal prominences appear increased from prior; indeterminant -clinical significance  -cont home meds  -HOLDING Buproprion and Trazodone while NPO - please resume when off NPO  - buproprion, depakote, trazodone  - seizure precautions    -----------------CVS:------------------  #Hypotension  c/f sepsis though lactate wnl at this time  s/p 3L IVF in ED  - low threshold for pressor initiation (has been given in past)    #HFpEF  last echo with normal EF and diastolic dysfunction and elevated pulm A. systolic pressure 48mmHg (feb 2022)  - f/u repeat echo    -----------------RESPIRATORY:--------  #COPD  No acute exacerbation  albuterol/Duonebs PRN (not on inhaler)     -----------------GI:----------------------  #suspected UPPER GIB  c/o NV w/ brown vomit and bright red blood x 4 days  - CT abd showing circumferential wall thickening of distal esophagus   - trend CBC, maintain active T/S  - Hgb 9.9 -> 8.9 (baseline Hgb ~9)  - PPI 80 push then 40 IVB BID  - carafate QID if tolerates PO  - NPO for now while nauseous  - will place NGT    #Vomiting  - CT abd showing circumferential wall thickening of distal esophagus  - having BMs  - symptomatic control PRN zofran     -----------------ID:-----------------------  #Sepsis 2/2 Complicated UTI   - CT abd showing cystitis and b/l danni-nephric stranding (seen on prior as well)  - Chronic daley replaced in ED 5/29  - f/u UCx, BCx  - cont vanc + cefepime    #Decub ulcers  - chronic sacral gluteal decub ulcers stage 1 and 2  - continue home topical ointment, unable to continue dermaseptin and hydrogel (not on formulary)  - wound care consulted    -----------------RENAL: ---------------  #SHEYLA on CKD   - p/w BUN/Cr 103/4.98, ratio 20  - Baseline Cr 3.5~3.8  - s/p 3 L IVF in ED  - has had HD on past admission for persistent acidosis  - trend Cr  - avoid nephrotoxins    #Hypokalemia  - P/w K 3.1->2.8  - Vomiting and decreased PO intake  - Will replete    -----------------HEME/ONC: ---------------  #Anemia  - P/w Hgb 9.9 -> 8.9 (baseline Hgb ~9)  - normocytic MCV 97.5  - has chronic anemia on ferrous sulfate QD  - will HOLD PO meds for now    -----------------ENDO: ---------------  #Hypothyroid  -cont synthroid 125 > conver 130 synthroid inj QHS    -----------------EXTREMITIES:-------  - maintain 2 large bore peripheral IVs  - Chronic daley - replaced in ED 5/29    -----------------PROPHYLAXIS: -------  - SCD  - GI PPI IB BID    -----------------DISPOSITION--------  ICU 62 y/o M, A&Ox3, walks with walker+assistance, chronic daley(since Dec 2022, failed TOV since), CKD4, ?COPD, ?HFpEF, BPH, PVD, Lymphedema, decubitus ulcers (Sacrum/gluteal) , Anemia, poly-neuropathy, poly-arthritis, hypothyroidism, seizures and Bipolar disorder was BIB EMS from [place] for abdominal pain, NV. Admit to icu for hypotension. Patient endorses BLOODY EMESIS x 4 days.    #Suspected upper GI bleed  #Nausea and vomiting  #Catheter Associated Complicated UTI  #CKD stage 4  #Hypokalemia  #Hypothyroidism  #Seizure disorder  #HFpEF  #?COPD  #Pressure injury    PLAN:  -----------------CNS:------------------  #Bipolar  #Seizure disorder  -CT head shows paranasal sinus disease, ventricular and sulcal prominences appear increased from prior  - cont home buproprion, depakote, trazodone  - seizure precautions    -----------------CVS:------------------  #Hypotension  - Initially 88/62  - Admits to poor oral take in the past 4 days due to vomiting  - s/p 3L IVF in ED  - Normotensive after IVF    #HFpEF  - Hx of grade 2 diastolic dysfunction  - Last echo with normal EF and diastolic dysfunction and elevated pulm A. systolic pressure 48mmHg (feb 2022)  - Not in exacerbation  - F/u repeat echo    -----------------RESPIRATORY:--------  #COPD  - Questionable history of COPD  - No acute exacerbation  - Home meds albuterol/Duonebs PRN (not on inhaler)     -----------------GI:----------------------  #suspected UPPER GIB  - c/o NV w/ brown vomit and bright red blood x 4 days  - CT abd showing circumferential wall thickening of distal esophagus   - Hgb 9.9 -> 8.9 (baseline Hgb ~9)  - PPI  - Clear liquid diet    #Vomiting  - CT abd showing circumferential wall thickening of distal esophagus  - Possibly due to complicated UTI  - having BMs without diarrhea  - symptomatic control PRN Reglan (Zofran discontinued due to prolonged QTc)     -----------------ID:-----------------------  #Catheter associated complicated UTI   - CT abd showing cystitis and b/l danni-nephric stranding (seen on prior as well)  - Chronic daley replaced in ED 5/29  - UA positive  - F/u UCx, BCx  - C/w cefepime    #Decub ulcers  - chronic sacral gluteal decub ulcers stage 1 and 2  - continue home topical ointment, unable to continue dermaseptin and hydrogel (not on formulary)  - wound care consulted    -----------------RENAL: ---------------  #CKD   - p/w BUN/Cr 103/4.98, ratio 20  - Baseline Cr 3.5~3.8  - S/p 3 L IVF in ED  - has had HD on past admission for persistent acidosis  - SCr improving  - avoid nephrotoxins    #Hypokalemia  - P/w K 3.1->2.8  - Vomiting and decreased PO intake  - Replete IV and oral    -----------------HEME/ONC: ---------------  #Anemia  - P/w Hgb 9.9 -> 8.9 (baseline Hgb ~9.0)  - normocytic MCV 97.5  - has chronic anemia on ferrous sulfate QD    -----------------ENDO: ---------------  #Hypothyroid  - Cont synthroid 130 as IV  - Switch to oral when nausea/vomiting resolves    -----------------EXTREMITIES:-------  - maintain 2 large bore peripheral IVs  - Chronic daley - replaced in ED 5/29    -----------------PROPHYLAXIS: -------  - Hep SC  - PPI until GI Bleeding ruled out    -----------------DISPOSITION--------  ICU 64 y/o M, A&Ox3, walks with walker+assistance, chronic daley(since Dec 2022, failed TOV since), CKD4, HFpEF, BPH, PVD, Lymphedema, decubitus ulcers (Sacrum/gluteal) , Anemia, poly-neuropathy, poly-arthritis, hypothyroidism, seizures and Bipolar disorder was BIB EMS from [place] for abdominal pain, NV. Admit to icu for hypotension. Patient endorses Dark colored emesis x 4 days.    #Sepsis  #Nausea and vomiting  #Catheter Associated Complicated UTI  #CKD stage 4  #Hypokalemia  #Hypothyroidism  #Seizure disorder  #HFpEF  #Pressure injury    PLAN:  -----------------CNS:------------------  #Bipolar  #Seizure disorder  -CT head shows paranasal sinus disease, ventricular and sulcal prominences appear increased from prior  - cont home buproprion, depakote, trazodone  - seizure precautions    -----------------CVS:------------------  #Hypotension  - Initially 88/62  - Admits to poor oral take in the past 4 days due to vomiting  - s/p 3L IVF in ED  - Normotensive after IVF    #HFpEF  - Hx of grade 2 diastolic dysfunction  - Last echo with normal EF and diastolic dysfunction and elevated pulm A. systolic pressure 48mmHg (feb 2022)  - Not in exacerbation  - F/u repeat echo    -----------------RESPIRATORY:--------  None    -----------------GI:--------------------  #Vomiting  - c/o NV w/ brown vomit and dark colored emesis x 4 days  - CT abd showing circumferential wall thickening of distal esophagus   - Hgb 9.9 -> 8.9 (baseline Hgb ~9)  - PPI  - Clear liquid diet  - CT abd showing circumferential wall thickening of distal esophagus  - Possibly due to complicated UTI  - having BMs without diarrhea  - symptomatic control PRN Reglan (Zofran discontinued due to prolonged QTc)     -----------------ID:-----------------------  #Catheter associated complicated UTI   - CT abd showing cystitis and b/l danni-nephric stranding (seen on prior as well)  - Chronic daley replaced in ED 5/29  - UA positive  - F/u UCx, BCx  - C/w cefepime    #Decub ulcers  - chronic sacral gluteal decub ulcers stage 1 and 2  - continue home topical ointment, unable to continue dermaseptin and hydrogel (not on formulary)  - wound care consulted    -----------------RENAL: ---------------  #CKD   - p/w BUN/Cr 103/4.98, ratio 20  - Baseline Cr 3.5~3.8  - S/p 3 L IVF in ED  - has had HD on past admission for persistent acidosis  - SCr improving  - avoid nephrotoxins    #Hypokalemia  - P/w K 3.1->2.8  - Vomiting and decreased PO intake  - Replete IV and oral    -----------------HEME/ONC: ---------------  #Anemia  - P/w Hgb 9.9 -> 8.9 (baseline Hgb ~9.0)  - normocytic MCV 97.5  - has chronic anemia on ferrous sulfate QD    -----------------ENDO: ---------------  #Hypothyroid  - Cont synthroid 130 as IV  - Switch to oral when nausea/vomiting resolves    -----------------EXTREMITIES:-------  - maintain 2 large bore peripheral IVs  - Chronic daley - replaced in ED 5/29    -----------------PROPHYLAXIS: -------  - Hep SC  - PPI until GI Bleeding ruled out    -----------------DISPOSITION--------  ICU

## 2023-05-29 NOTE — CONSULT NOTE ADULT - ASSESSMENT
1. SHEYLA due to hypovolemia state  -Scr is improving to 4.46mg/dl from 4.9mg/dL.   -send urine lytes. CT scan shows no hydro but perinephric stranding.  -Adjust meds to eGFR and avoid IV Gadolinium contrast,NSAIDs, and phosphate enema.  -Monitor I/O's daily.   -Monitor SMA daily.  2. CKD stage 4 most likely due to ischemic nephropathy  -baseline scr around 3.7mg/dL in Dec after ATN with renal recovery.  -Keep patient euvolemic and renal diet  -Avoid Nephrotoxic Meds/ Agents such as (NSAIDs, IV contrast, Aminoglycosides such as gentamicin, -Gadolinium contrast, Phosphate containing enemas, etc..)  -Adjust Medications according to eGFR  3. Hypotension  -bp is stable   -s/p 3L on admission  -monitor BP.  4. Mineral Bone Disease:  -phos is okay without binders  -check PTH intact in am.   5. Anemia of CKD  -Hematemesis being monitor but stable hb.   -check iron panel/ferritin; pt may benefit from SUREKHA.    -F/u CBC daily  -transfuse if HB < 7.0.  6. Hypokalemia:  -supplement K to keep >4.0  -monitor K.   7. UTI with chronic daley's cath.  -on iv cefepime  -f/u urine culture    Patient is critically ill. Time Spent: 35mins.  Discussed the assessment and plan with ICU Team/Nurse

## 2023-05-29 NOTE — H&P ADULT - NSHPPHYSICALEXAM_GEN_ALL_CORE
PHYSICAL EXAMINATION:  GENERAL: NAD, well built - tremulous  HEAD:  Atraumatic, Normocephalic  EYES:  conjunctiva and sclera clear - pallor  NECK: Supple, No JVD  CHEST/LUNG: Clear to auscultation.  No rales, rhonchi, wheezing, or rubs  HEART: Regular rate and rhythm; No murmurs, rubs, or gallops  ABDOMEN: Soft, tender throughout - non-focal. Nondistended; Bowel sounds present  NERVOUS SYSTEM:  Alert & Oriented X3,    EXTREMITIES:  2+ Peripheral Pulses, No clubbing, cyanosis, or edema  SKIN: warm dry

## 2023-05-29 NOTE — CHART NOTE - NSCHARTNOTEFT_GEN_A_CORE
Patient received from ICU.  Alert and oriented x 3.  Resting comfortably in bed with emesis bag at side.  Just medicated for nausea.  Denies pain, no distress noted.  Viera draining clear samantha urine.  Pt in agreement to try clear liquid diet and advance when tolerated.

## 2023-05-29 NOTE — H&P ADULT - ASSESSMENT
Assessment  64 y/o M, A&Ox3, bedbound, chronic daley, CKD COPD, CHFpEF, BPH, PVD, Lymphedema, decubitus ulcers (Sacrum/gluteal) , Anemia, poly-neuropathy, poly-arthritis, hypothyroidism, seizures and Bipolar disorder was BIB EMS from [place] for abdominal pain, NV. Admit to icu for hypotension.     PLAN:    -----------------CNS:------------------  #Bipolar  #Seizure  CT head shows paranasal sinus disease, ventricular and sulcal prominences appear increased from prior; indeterminant clinical significance  cont home meds  - buproprion, depakote, trazodone  - seizure precautions    -----------------CVS:------------------  #Hypotension  c/f sepsis though lactate wnl at this time  s/p 3L IVF in ED  - low threshold for pressor initiation (has been given in past)    #CHFpEF  last echo with normal EF and diastolic dysfunction and elevated pulm A. systolic pressure 48mmHg (feb 2022)  - f/u repeat echo    -----------------RESPIRATORY:--------    -----------------GI:----------------------    -----------------ID:-----------------------    -----------------RENAL: ---------------    -----------------HEME/ONC: ---------------    -----------------ENDO: ---------------    -----------------EXTREMITIES:-------    -----------------PROPHYLAXIS: -------    DVT   GI    -----------------DISPOSITION-------- Assessment  64 y/o M, A&Ox3, bedbound, chronic daley, CKD COPD, CHFpEF, BPH, PVD, Lymphedema, decubitus ulcers (Sacrum/gluteal) , Anemia, poly-neuropathy, poly-arthritis, hypothyroidism, seizures and Bipolar disorder was BIB EMS from [place] for abdominal pain, NV. Admit to icu for hypotension. Patient endorses BLOODY EMESIS x 4 days.    PLAN:    -----------------CNS:------------------  #Bipolar  #Seizure  CT head shows paranasal sinus disease, ventricular and sulcal prominences appear increased from prior; indeterminant clinical significance  cont home meds  - buproprion, depakote, trazodone  - seizure precautions    -----------------CVS:------------------  #Hypotension  c/f sepsis though lactate wnl at this time  s/p 3L IVF in ED  - low threshold for pressor initiation (has been given in past)    #CHFpEF  last echo with normal EF and diastolic dysfunction and elevated pulm A. systolic pressure 48mmHg (feb 2022)  - f/u repeat echo    -----------------RESPIRATORY:--------  #COPD  No acute exacerbation  albuterol/Duonebs PRN (not on inhaler)     -----------------GI:----------------------  #suspected UPPER GIB  c/o NV w/ brown vomit and bright red blood x 4 days  CT abd showing circumferential wall thickening of distal esophagus   - trend CBC, maintain active T/S  - PPI 80 push then 40 IVB BID  - carafate QID if tolerates PO  - NPO for now while nauseous  - will place NGT    -----------------ID:-----------------------  #Sepsis 2/2 Complicated UTI   CT abd showing cystitis and b/l danni-nephric stranding (seen on prior as well)  Chronic daley replaced in ED 5/29  - f/u UCx, BCx  - cont vanc + cefepime    #Decub ulcers  chronic sacral gluteal decub ulcers stage 1 and 2  continue home topical ointment, unable to continue dermaseptin and hydrogel (not on formulary)  - wound care consulted    -----------------RENAL: ---------------  #CKD   Baseline Cr 3.8, p/w BUN/Cr 103/4.98, ratio 20  s/p 3 L IVF in ED  has had HD on past admission for persistent acidosis  - trend Cr  - avoid nephrotoxins    -----------------HEME/ONC: ---------------  #Anemia  normocytic MCV 97.5  has chronic anemia on ferrous sulfate QD  will HOLD PO meds for now    -----------------ENDO: ---------------  #Hypothyroid  cont synthroid 125 > conver 130 synthroid inj QHS    -----------------EXTREMITIES:-------  maintain 2 large bore peripheral IVs  Chronic daley - replaced in ED 5/29  SCD  -----------------PROPHYLAXIS: -------    DVT NO CHEMICAL DVT PPX susp GIB   GI PPI IB BID    -----------------DISPOSITION--------  ICU Assessment  62 y/o M, A&Ox3, bedbound, chronic daley, CKD COPD, CHFpEF, BPH, PVD, Lymphedema, decubitus ulcers (Sacrum/gluteal) , Anemia, poly-neuropathy, poly-arthritis, hypothyroidism, seizures and Bipolar disorder was BIB EMS from [place] for abdominal pain, NV. Admit to icu for hypotension. Patient endorses BLOODY EMESIS x 4 days.    PLAN:    -----------------CNS:------------------  #Bipolar  #Seizure  CT head shows paranasal sinus disease, ventricular and sulcal prominences appear increased from prior; indeterminant clinical significance  cont home meds  - buproprion, depakote, trazodone  - seizure precautions    -----------------CVS:------------------  #Hypotension  c/f sepsis though lactate wnl at this time  s/p 3L IVF in ED  - low threshold for pressor initiation (has been given in past)    #CHFpEF  last echo with normal EF and diastolic dysfunction and elevated pulm A. systolic pressure 48mmHg (feb 2022)  - f/u repeat echo    -----------------RESPIRATORY:--------  #COPD  No acute exacerbation  albuterol/Duonebs PRN (not on inhaler)     -----------------GI:----------------------  #suspected UPPER GIB  c/o NV w/ brown vomit and bright red blood x 4 days  CT abd showing circumferential wall thickening of distal esophagus   - trend CBC, maintain active T/S  - PPI 80 push then 40 IVB BID  - carafate QID if tolerates PO  - NPO for now while nauseous  - will place NGT    #Vomiting  CT abd showing circumferential wall thickening of distal esophagus  having BMs  - symptomatic control PRN zofran     -----------------ID:-----------------------  #Sepsis 2/2 Complicated UTI   CT abd showing cystitis and b/l danni-nephric stranding (seen on prior as well)  Chronic daley replaced in ED 5/29  - f/u UCx, BCx  - cont vanc + cefepime    #Decub ulcers  chronic sacral gluteal decub ulcers stage 1 and 2  continue home topical ointment, unable to continue dermaseptin and hydrogel (not on formulary)  - wound care consulted    -----------------RENAL: ---------------  #CKD   Baseline Cr 3.8, p/w BUN/Cr 103/4.98, ratio 20  s/p 3 L IVF in ED  has had HD on past admission for persistent acidosis  - trend Cr  - avoid nephrotoxins    -----------------HEME/ONC: ---------------  #Anemia  normocytic MCV 97.5  has chronic anemia on ferrous sulfate QD  will HOLD PO meds for now    -----------------ENDO: ---------------  #Hypothyroid  cont synthroid 125 > conver 130 synthroid inj QHS    -----------------EXTREMITIES:-------  maintain 2 large bore peripheral IVs  Chronic daley - replaced in ED 5/29  SCD  -----------------PROPHYLAXIS: -------    DVT NO CHEMICAL DVT PPX susp GIB   GI PPI IB BID    -----------------DISPOSITION--------  ICU Assessment  64 y/o M, A&Ox3, bedbound, chronic daley, CKD COPD, CHFpEF, BPH, PVD, Lymphedema, decubitus ulcers (Sacrum/gluteal) , Anemia, poly-neuropathy, poly-arthritis, hypothyroidism, seizures and Bipolar disorder was BIB EMS from [place] for abdominal pain, NV. Admit to icu for hypotension. Patient endorses BLOODY EMESIS x 4 days.    PLAN:    -----------------CNS:------------------  #Bipolar  #Seizure  CT head shows paranasal sinus disease, ventricular and sulcal prominences appear increased from prior; indeterminant clinical significance  cont home meds  HOLDING Buproprion and Trazodone while NPO - please resume when off NPO  - buproprion, depakote, trazodone  - seizure precautions    -----------------CVS:------------------  #Hypotension  c/f sepsis though lactate wnl at this time  s/p 3L IVF in ED  - low threshold for pressor initiation (has been given in past)    #CHFpEF  last echo with normal EF and diastolic dysfunction and elevated pulm A. systolic pressure 48mmHg (feb 2022)  - f/u repeat echo    -----------------RESPIRATORY:--------  #COPD  No acute exacerbation  albuterol/Duonebs PRN (not on inhaler)     -----------------GI:----------------------  #suspected UPPER GIB  c/o NV w/ brown vomit and bright red blood x 4 days  CT abd showing circumferential wall thickening of distal esophagus   - trend CBC, maintain active T/S  - PPI 80 push then 40 IVB BID  - carafate QID if tolerates PO  - NPO for now while nauseous  - will place NGT    #Vomiting  CT abd showing circumferential wall thickening of distal esophagus  having BMs  - symptomatic control PRN zofran     -----------------ID:-----------------------  #Sepsis 2/2 Complicated UTI   CT abd showing cystitis and b/l danni-nephric stranding (seen on prior as well)  Chronic daley replaced in ED 5/29  - f/u UCx, BCx  - cont vanc + cefepime    #Decub ulcers  chronic sacral gluteal decub ulcers stage 1 and 2  continue home topical ointment, unable to continue dermaseptin and hydrogel (not on formulary)  - wound care consulted    -----------------RENAL: ---------------  #CKD   Baseline Cr 3.8, p/w BUN/Cr 103/4.98, ratio 20  s/p 3 L IVF in ED  has had HD on past admission for persistent acidosis  - trend Cr  - avoid nephrotoxins    -----------------HEME/ONC: ---------------  #Anemia  normocytic MCV 97.5  has chronic anemia on ferrous sulfate QD  will HOLD PO meds for now    -----------------ENDO: ---------------  #Hypothyroid  cont synthroid 125 > conver 130 synthroid inj QHS    -----------------EXTREMITIES:-------  maintain 2 large bore peripheral IVs  Chronic daley - replaced in ED 5/29  SCD  -----------------PROPHYLAXIS: -------    DVT NO CHEMICAL DVT PPX susp GIB   GI PPI IB BID    -----------------DISPOSITION--------  ICU

## 2023-05-29 NOTE — PATIENT PROFILE ADULT - FALL HARM RISK - HARM RISK INTERVENTIONS

## 2023-05-30 NOTE — CONSULT NOTE ADULT - ASSESSMENT
63M, from [facility], with significant PMH anemia, CKD4 (base SCr 3.8), p/w abd pain a/w "brown” emesis and food intolerance x4d. Admitted for complicated UTI + suspected GIB.    GIB has resolved  Imaging indicates esophagitis.      Plan for EGD today  Keep NPO  PPI BID  Carafate    This note and its recommendations herein are preliminary until such time as cosigned by an attending.    GI will continue to follow.  Thank you for the consult!

## 2023-05-30 NOTE — PROGRESS NOTE ADULT - NS ATTEND AMEND GEN_ALL_CORE FT
64 y/o M, A&Ox3, walks with walker+assistance, chronic daley(since Dec 2022, failed TOV since), CKD4, HFpEF, BPH, PVD, Lymphedema, decubitus ulcers (Sacrum/gluteal) , Anemia, poly-neuropathy, poly-arthritis, hypothyroidism, seizures and Bipolar disorder was BIB EMS from [employer] for abdominal pain, NV. Admit to icu for hypotension. Patient endorses dark colored emesis     Assessment:  1. Sepsis  2. Vomiting  3. Catheter associated complicated UTI  4. SHEYLA on CKD stage 4   5. Seizure disorder  6. HFpEF  7. Pressure injury  8. Pseudomonas BSI    Plan:  - No further episodes of vomiting or bleeding reported  - Noted with esophageal thickening on CT scan  - GI consult for EGD today   - NPO for now   - cont. PPI   - Follow up cultures   - IV fluids   - Cont. Antibiotics   - ID consult   - Repeat blood cultures   - PPI   - Cont. BPH medications  - Chronic daley catheter - changed in the ED  - DVT prophylaxis

## 2023-05-30 NOTE — PROGRESS NOTE ADULT - PROBLEM SELECTOR PLAN 2
secondary to complicated UTI  blood culture positive- Gram negative rids  f/u Urine culture   f/u repeat blood culture  continue Cefepime see CT as above  NPO  continue PPI  pending endoscopy

## 2023-05-30 NOTE — PROGRESS NOTE ADULT - PROBLEM SELECTOR PLAN 5
SHEYLA on CKD stage 4- likely prerenal  Serum creatinine today at baseline likely anemia of chronic disease and GIB  currently no active bleed  Hemoglobin 8.5  transfuse if Hemoglobin <7

## 2023-05-30 NOTE — PROGRESS NOTE ADULT - SUBJECTIVE AND OBJECTIVE BOX
NEPHROLOGY MEDICAL CARE, [facility] - Dr. Taj Valenzuela/ Dr. Hazel Solis/ Dr. Cosmo Root/ Dr. Fang Gifford    Date of Service: 23 @ 16:23    Patient was seen and examined at bedside.    CC: patient is okay post egd    Vital Signs Last 24 Hrs  T(C): 36.6 (30 May 2023 15:54), Max: 36.9 (30 May 2023 14:12)  T(F): 97.9 (30 May 2023 15:54), Max: 98.4 (30 May 2023 14:12)  HR: 92 (30 May 2023 15:54) (66 - 102)  BP: 99/58 (30 May 2023 15:54) (90/67 - 107/53)  BP(mean): 68 (29 May 2023 17:15) (58 - 68)  RR: 18 (30 May 2023 15:54) (13 - 20)  SpO2: 100% (30 May 2023 15:54) (98% - 100%)    Parameters below as of 30 May 2023 15:54  Patient On (Oxygen Delivery Method): room air         @ 07:01  -   @ 07:00  --------------------------------------------------------  IN: 1740 mL / OUT: 560 mL / NET: 1180 mL        PHYSICAL EXAM:  General: No acute respiratory distress.  Eyes: conjunctiva and sclera clear  ENMT: Atraumatic, Normocephalic, supple, No JVD present.   Respiratory: Bilateral poor air entry; No rales, rhonchi, wheezing  Cardiovascular: S1S2+; no m/r/g  Gastrointestinal: Soft, Non-tender, Nondistended; Bowel sounds present  : daely's cath with clear urine.  Neuro:  Awake and oriented.  Ext: pedal edema, No Cyanosis  Skin: No visible rashes    MEDICATIONS:  MEDICATIONS  (STANDING):  albuterol    90 MICROgram(s) HFA Inhaler 2 Puff(s) Inhalation every 6 hours  buPROPion XL (24-Hour) . 150 milliGRAM(s) Oral daily  cefepime   IVPB      chlorhexidine 2% Cloths 1 Application(s) Topical <User Schedule>  divalproex  milliGRAM(s) Oral two times a day  dorzolamide 2% Ophthalmic Solution 1 Drop(s) Right EYE <User Schedule>  finasteride 5 milliGRAM(s) Oral daily  heparin   Injectable 5000 Unit(s) SubCutaneous every 8 hours  levothyroxine 125 MICROGram(s) Oral daily  mupirocin 2% Ointment 1 Application(s) Both Nostrils two times a day  pantoprazole    Tablet 40 milliGRAM(s) Oral two times a day  sucralfate suspension 1 Gram(s) Oral four times a day  tamsulosin 0.4 milliGRAM(s) Oral at bedtime  traZODone 50 milliGRAM(s) Oral at bedtime  vitamin A &amp; D Ointment 1 Application(s) Topical at bedtime    MEDICATIONS  (PRN):  metoclopramide Injectable 5 milliGRAM(s) IV Push every 6 hours PRN nausea/vomit          LABS:                        8.5    6.99  )-----------( 247      ( 30 May 2023 06:53 )             27.5     05-30    142  |  115<H>  |  84<H>  ----------------------------<  68<L>  3.4<L>   |  12<L>  |  3.81<H>    Ca    10.3      30 May 2023 06:53  Phos  3.2     05-30  Mg     2.8     05-30    TPro  5.4<L>  /  Alb  2.0<L>  /  TBili  0.2  /  DBili  x   /  AST  5<L>  /  ALT  <6<L>  /  AlkPhos  86  05-30    PT/INR - ( 28 May 2023 21:02 )   PT: 11.4 sec;   INR: 0.96 ratio         PTT - ( 28 May 2023 21:02 )  PTT:32.5 sec  Urinalysis Basic - ( 28 May 2023 21:02 )    Color: Pale Yellow / Appearance: Slightly Turbid / S.010 / pH: x  Gluc: x / Ketone: Trace  / Bili: Negative / Urobili: Negative   Blood: x / Protein: 100 mg/dL / Nitrite: Negative   Leuk Esterase: Moderate / RBC: 10-25 /HPF / WBC 26-50 /HPF   Sq Epi: x / Non Sq Epi: x / Bacteria: TNTC /HPF      Magnesium, Serum: 2.8 mg/dL ( @ 06:53)  Phosphorus Level, Serum: 3.2 mg/dL ( @ :53)  Intact PTH: 33 pg/mL ( @ 06:53)    Urine studies  Sodium, Random Urine: 37 mmol/L ( @ 12:18)  Creatinine, Random Urine: 31 mg/dL ( @ 12:18)  Urea Nitrogen,  Random Urine: 289 mg/dL ( @ 12:18)    PTH and Vit D:  Intact PTH: 33 pg/mL ( @ 06:53)

## 2023-05-30 NOTE — CONSULT NOTE ADULT - SUBJECTIVE AND OBJECTIVE BOX
HPI:  62 y/o M, A&Ox3, bedbound, chronic daley catheter with CKD (baseline creat 3.8), COPD, CHFpEF, BPH, PVD, Lymphedema, decubitus ulcers (Sacrum/gluteal) , Anemia, poly-neuropathy, poly-arthritis, hypothyroidism, seizures and Bipolar disorder was BIB EMS from [place] for abdominal pain, [place]. Mr. Rhoades states that he developed acute onset sharp abdominal pain 4 days ago a/w "brown" emesis (witnessed by EMS). Unable to tolerate food/drink x 4 days. Having normal bowel movements, last this morning . Denies any fevers. NH paperwork states concern for obstruction.  Patient endorses BLOODY EMESIS x 4 days upon further questioning. (29 May 2023 01:36)      PAST MEDICAL & SURGICAL HISTORY:  Hypothyroid      Hyperlipidemia      Ambulatory dysfunction      Anemia      History of BPH      CKD (chronic kidney disease)      Chronic obstructive pulmonary disease (COPD)      Bipolar disorder      HLD (hyperlipidemia)      BPH (benign prostatic hyperplasia)      Chronic diastolic congestive heart failure      Lymphedema      Chronic leg pain      No significant past surgical history          No Known Allergies      Meds:  albuterol    90 MICROgram(s) HFA Inhaler 2 Puff(s) Inhalation every 6 hours  buPROPion XL (24-Hour) . 150 milliGRAM(s) Oral daily  cefepime   IVPB      chlorhexidine 2% Cloths 1 Application(s) Topical <User Schedule>  divalproex  milliGRAM(s) Oral two times a day  dorzolamide 2% Ophthalmic Solution 1 Drop(s) Right EYE <User Schedule>  finasteride 5 milliGRAM(s) Oral daily  heparin   Injectable 5000 Unit(s) SubCutaneous every 8 hours  levothyroxine 125 MICROGram(s) Oral daily  metoclopramide Injectable 5 milliGRAM(s) IV Push every 6 hours PRN  mupirocin 2% Ointment 1 Application(s) Both Nostrils two times a day  pantoprazole    Tablet 40 milliGRAM(s) Oral two times a day  sucralfate suspension 1 Gram(s) Oral four times a day  tamsulosin 0.4 milliGRAM(s) Oral at bedtime  traZODone 50 milliGRAM(s) Oral at bedtime  vitamin A &amp; D Ointment 1 Application(s) Topical at bedtime      SOCIAL HISTORY:  Smoker:  YES / NO        PACK YEARS:                         WHEN QUIT?  ETOH use:  YES / NO               FREQUENCY / QUANTITY:  Ilicit Drug use:  YES / NO  Occupation:  Assisted device use (Cane / Walker):  Live with:    FAMILY HISTORY:      VITALS:  Vital Signs Last 24 Hrs  T(C): 36.6 (30 May 2023 15:54), Max: 36.9 (30 May 2023 14:12)  T(F): 97.9 (30 May 2023 15:54), Max: 98.4 (30 May 2023 14:12)  HR: 92 (30 May 2023 15:54) (84 - 102)  BP: 99/58 (30 May 2023 15:54) (90/67 - 107/53)  BP(mean): --  RR: 18 (30 May 2023 15:54) (16 - 20)  SpO2: 100% (30 May 2023 15:54) (98% - 100%)    Parameters below as of 30 May 2023 15:54  Patient On (Oxygen Delivery Method): room air        LABS/DIAGNOSTIC TESTS:                          8.5    6.99  )-----------( 247      ( 30 May 2023 06:53 )             27.5     WBC Count: 6.99 K/uL ( @ 06:53)  WBC Count: 6.46 K/uL ( @ 22:40)  WBC Count: 7.48 K/uL ( @ 17:10)  WBC Count: 6.73 K/uL ( @ 13:53)  WBC Count: 7.40 K/uL ( @ 04:50)  WBC Count: 7.73 K/uL ( @ 21:02)          142  |  115<H>  |  84<H>  ----------------------------<  68<L>  3.4<L>   |  12<L>  |  3.81<H>    Ca    10.3      30 May 2023 06:53  Phos  3.2       Mg     2.8         TPro  5.4<L>  /  Alb  2.0<L>  /  TBili  0.2  /  DBili  x   /  AST  5<L>  /  ALT  <6<L>  /  AlkPhos  86        Urinalysis Basic - ( 28 May 2023 21:02 )    Color: Pale Yellow / Appearance: Slightly Turbid / S.010 / pH: x  Gluc: x / Ketone: Trace  / Bili: Negative / Urobili: Negative   Blood: x / Protein: 100 mg/dL / Nitrite: Negative   Leuk Esterase: Moderate / RBC: 10-25 /HPF / WBC 26-50 /HPF   Sq Epi: x / Non Sq Epi: x / Bacteria: TNTC /HPF        LIVER FUNCTIONS - ( 30 May 2023 06:53 )  Alb: 2.0 g/dL / Pro: 5.4 g/dL / ALK PHOS: 86 U/L / ALT: <6 U/L DA / AST: 5 U/L / GGT: x             PT/INR - ( 28 May 2023 21:02 )   PT: 11.4 sec;   INR: 0.96 ratio         PTT - ( 28 May 2023 21:02 )  PTT:32.5 sec    LACTATE:    ABG -     CULTURES:   .Blood Blood-Peripheral   @ 21:09   No growth to date.  --  Blood Culture PCR      Clean Catch Clean Catch (Midstream)   @ 21:02   >=3 organisms. Probable collection contamination.  --  --          RADIOLOGY:< from: CT Abdomen and Pelvis No Cont (23 @ 22:34) >  ACC: [id number] EXAM:  CT ABDOMEN AND PELVIS   ORDERED BY: GERHARD REDDY     PROCEDURE DATE:  2023          INTERPRETATION:  CLINICAL INFORMATION: Abdominal pain and vomiting. Rule   out obstruction.    COMPARISON: CT of the abdomen and pelvis from 2022.    CONTRAST/COMPLICATIONS:  IV Contrast: NONE  Evaluation of the visceral organs is limited without   intravenous contrast  Oral Contrast: NONE  Complications: None reported at time of study completion    PROCEDURE:  CT of the Abdomen and Pelvis was performed.  Sagittal and coronal reformats were performed.    FINDINGS:  LOWER CHEST: Bibasilar subsegmental atelectasis. Circumferential wall   thickening of the distal esophagus.    LIVER: Within normal limits.  BILE DUCTS: Normal caliber.  GALLBLADDER: Cholecystectomy.  SPLEEN: Within normal limits.  PANCREAS: Within normal limits.  ADRENALS: Within normal limits.  KIDNEYS/URETERS: Redemonstrated moderate nonspecific bilateral   perinephric fat stranding. No hydronephrosis.    BLADDER: Collapsed around a Daley catheter balloon. Intravesicular gas   secondary to instrumentation. Circumferential wall thickening.  REPRODUCTIVE ORGANS: Prostate within normal limits.    BOWEL: No bowel obstruction or inflammation. Scattered colonic   diverticulosis without diverticulitis. Appendix is normal.  PERITONEUM: No ascites.  VESSELS: Infrarenal IVC filter.  RETROPERITONEUM/LYMPH NODES: No lymphadenopathy.  ABDOMINAL WALL: Small bilateral fat-containing inguinal hernias.  BONES: Old right rib fractures. Severe right hip degenerative change with   remodeling of the right femoral head. Degenerative changes of the spine.    IMPRESSION:  1. No bowel obstruction or inflammation.  2. Circumferential wall thickening of the distal esophagus which could be   due to an esophagitis.  3. Circumferential urinary bladder wall thickening which could be due to   underdistention versus a cystitis. Correlate with urinalysis.        --- End of Report ---            GERHARD HAGAN MD; Attending Radiologist  This document has been electronically signed. May 28 2023 11:30PM    < end of copied text >  ------------------------------------------------------------------------------------------------------------------------------------------------------------------------------------------  ACC: [id number] EXAM:  XR CHEST PORTABLE URGENT 1V   ORDERED BY: GERHARD REDDY     PROCEDURE DATE:  2023          INTERPRETATION:  CLINICAL STATEMENT: Abdominal pain. Rule out infiltrate    TECHNIQUE: AP view of the chest.      COMPARISON: 2022    The cardiomediastinal silhouette is normal and the anthony are not enlarged.   The trachea is midline. Suboptimal degree of inspiration with   bronchovascular crowding. Mild platelike atelectatic change right base.   There is no focal lung consolidation or sizable pleural effusion. No   significant osseous abnormality. Degenerative changes of the spine and AC   joints. Old right rib fracture deformities.    IMPRESSION:    Unremarkable frontal chest x ray    --- End of Report ---            THOMAS ELLIOTT MD; Attending Radiologist  This document has been electronically signed. May 29 2023  1:01PM    < end of copied text >        ROS  [  ] UNABLE TO ELICIT               HPI:  62 y/o M, A&Ox3, bedbound, chronic daley catheter with CKD (baseline creat 3.8), COPD, CHFpEF, BPH, PVD, Lymphedema, decubitus ulcers (Sacrum/gluteal) , Anemia, poly-neuropathy, poly-arthritis, hypothyroidism, seizures and Bipolar disorder was BIB EMS from [place] for abdominal pain, [place]. Mr. Rhoades states that he developed acute onset sharp abdominal pain 4 days ago a/w "brown" emesis (witnessed by EMS). Unable to tolerate food/drink x 4 days. Having normal bowel movements, last this morning . Denies any fevers. NH paperwork states concern for obstruction.  Patient endorses BLOODY EMESIS x 4 days upon further questioning. (29 May 2023 01:36)      History as above, asked to see this patient who is from a NH and was found to have Gram Negative Bacteremia which has been identified as Pseudomonas, he has no fevers or chills , he has a chronic indwelling daley and his urine culture is contaminated.        PAST MEDICAL & SURGICAL HISTORY:  Hypothyroid      Hyperlipidemia      Ambulatory dysfunction      Anemia      History of BPH      CKD (chronic kidney disease)      Chronic obstructive pulmonary disease (COPD)      Bipolar disorder      HLD (hyperlipidemia)      BPH (benign prostatic hyperplasia)      Chronic diastolic congestive heart failure      Lymphedema      Chronic leg pain      No significant past surgical history          No Known Allergies      Meds:  albuterol    90 MICROgram(s) HFA Inhaler 2 Puff(s) Inhalation every 6 hours  buPROPion XL (24-Hour) . 150 milliGRAM(s) Oral daily  cefepime   IVPB      chlorhexidine 2% Cloths 1 Application(s) Topical <User Schedule>  divalproex  milliGRAM(s) Oral two times a day  dorzolamide 2% Ophthalmic Solution 1 Drop(s) Right EYE <User Schedule>  finasteride 5 milliGRAM(s) Oral daily  heparin   Injectable 5000 Unit(s) SubCutaneous every 8 hours  levothyroxine 125 MICROGram(s) Oral daily  metoclopramide Injectable 5 milliGRAM(s) IV Push every 6 hours PRN  mupirocin 2% Ointment 1 Application(s) Both Nostrils two times a day  pantoprazole    Tablet 40 milliGRAM(s) Oral two times a day  sucralfate suspension 1 Gram(s) Oral four times a day  tamsulosin 0.4 milliGRAM(s) Oral at bedtime  traZODone 50 milliGRAM(s) Oral at bedtime  vitamin A &amp; D Ointment 1 Application(s) Topical at bedtime      SOCIAL HISTORY:  Smoker:  YES / NO        PACK YEARS:                         WHEN QUIT?  ETOH use:  YES / NO               FREQUENCY / QUANTITY:  Ilicit Drug use:  YES / NO  Occupation:  Assisted device use (Cane / Walker):  Live with:    FAMILY HISTORY:      VITALS:  Vital Signs Last 24 Hrs  T(C): 36.6 (30 May 2023 15:54), Max: 36.9 (30 May 2023 14:12)  T(F): 97.9 (30 May 2023 15:54), Max: 98.4 (30 May 2023 14:12)  HR: 92 (30 May 2023 15:54) (84 - 102)  BP: 99/58 (30 May 2023 15:54) (90/67 - 107/53)  BP(mean): --  RR: 18 (30 May 2023 15:54) (16 - 20)  SpO2: 100% (30 May 2023 15:54) (98% - 100%)    Parameters below as of 30 May 2023 15:54  Patient On (Oxygen Delivery Method): room air        LABS/DIAGNOSTIC TESTS:                          8.5    6.99  )-----------( 247      ( 30 May 2023 06:53 )             27.5     WBC Count: 6.99 K/uL ( @ 06:53)  WBC Count: 6.46 K/uL ( @ 22:40)  WBC Count: 7.48 K/uL ( @ 17:10)  WBC Count: 6.73 K/uL ( @ 13:53)  WBC Count: 7.40 K/uL ( @ 04:50)  WBC Count: 7.73 K/uL ( @ 21:02)          142  |  115<H>  |  84<H>  ----------------------------<  68<L>  3.4<L>   |  12<L>  |  3.81<H>    Ca    10.3      30 May 2023 06:53  Phos  3.2       Mg     2.8         TPro  5.4<L>  /  Alb  2.0<L>  /  TBili  0.2  /  DBili  x   /  AST  5<L>  /  ALT  <6<L>  /  AlkPhos  86  30      Urinalysis Basic - ( 28 May 2023 21:02 )    Color: Pale Yellow / Appearance: Slightly Turbid / S.010 / pH: x  Gluc: x / Ketone: Trace  / Bili: Negative / Urobili: Negative   Blood: x / Protein: 100 mg/dL / Nitrite: Negative   Leuk Esterase: Moderate / RBC: 10-25 /HPF / WBC 26-50 /HPF   Sq Epi: x / Non Sq Epi: x / Bacteria: TNTC /HPF        LIVER FUNCTIONS - ( 30 May 2023 06:53 )  Alb: 2.0 g/dL / Pro: 5.4 g/dL / ALK PHOS: 86 U/L / ALT: <6 U/L DA / AST: 5 U/L / GGT: x             PT/INR - ( 28 May 2023 21:02 )   PT: 11.4 sec;   INR: 0.96 ratio         PTT - ( 28 May 2023 21:02 )  PTT:32.5 sec    LACTATE:    ABG -     CULTURES:       Specimen Source:   .Blood Blood-Peripheral (23 @ 21:09) - Pseudomonas aeruginosa: Detec (23 @ 21:09)       .Blood Blood-Peripheral   @ 21:09   No growth to date.  --  Blood Culture PCR      Clean Catch Clean Catch (Midstream)   @ 21:02   >=3 organisms. Probable collection contamination.  --  --          RADIOLOGY:< from: CT Abdomen and Pelvis No Cont (23 @ 22:34) >  ACC: [id number] EXAM:  CT ABDOMEN AND PELVIS   ORDERED BY: GERHARD REDDY     PROCEDURE DATE:  2023          INTERPRETATION:  CLINICAL INFORMATION: Abdominal pain and vomiting. Rule   out obstruction.    COMPARISON: CT of the abdomen and pelvis from 2022.    CONTRAST/COMPLICATIONS:  IV Contrast: NONE  Evaluation of the visceral organs is limited without   intravenous contrast  Oral Contrast: NONE  Complications: None reported at time of study completion    PROCEDURE:  CT of the Abdomen and Pelvis was performed.  Sagittal and coronal reformats were performed.    FINDINGS:  LOWER CHEST: Bibasilar subsegmental atelectasis. Circumferential wall   thickening of the distal esophagus.    LIVER: Within normal limits.  BILE DUCTS: Normal caliber.  GALLBLADDER: Cholecystectomy.  SPLEEN: Within normal limits.  PANCREAS: Within normal limits.  ADRENALS: Within normal limits.  KIDNEYS/URETERS: Redemonstrated moderate nonspecific bilateral   perinephric fat stranding. No hydronephrosis.    BLADDER: Collapsed around a Daley catheter balloon. Intravesicular gas   secondary to instrumentation. Circumferential wall thickening.  REPRODUCTIVE ORGANS: Prostate within normal limits.    BOWEL: No bowel obstruction or inflammation. Scattered colonic   diverticulosis without diverticulitis. Appendix is normal.  PERITONEUM: No ascites.  VESSELS: Infrarenal IVC filter.  RETROPERITONEUM/LYMPH NODES: No lymphadenopathy.  ABDOMINAL WALL: Small bilateral fat-containing inguinal hernias.  BONES: Old right rib fractures. Severe right hip degenerative change with   remodeling of the right femoral head. Degenerative changes of the spine.    IMPRESSION:  1. No bowel obstruction or inflammation.  2. Circumferential wall thickening of the distal esophagus which could be   due to an esophagitis.  3. Circumferential urinary bladder wall thickening which could be due to   underdistention versus a cystitis. Correlate with urinalysis.        --- End of Report ---            GERHARD HAGAN MD; Attending Radiologist  This document has been electronically signed. May 28 2023 11:30PM    < end of copied text >  ------------------------------------------------------------------------------------------------------------------------------------------------------------------------------------------  ACC: [id number] EXAM:  XR CHEST PORTABLE URGENT 1V   ORDERED BY: GERHARD REDDY     PROCEDURE DATE:  2023          INTERPRETATION:  CLINICAL STATEMENT: Abdominal pain. Rule out infiltrate    TECHNIQUE: AP view of the chest.      COMPARISON: 2022    The cardiomediastinal silhouette is normal and the anthony are not enlarged.   The trachea is midline. Suboptimal degree of inspiration with   bronchovascular crowding. Mild platelike atelectatic change right base.   There is no focal lung consolidation or sizable pleural effusion. No   significant osseous abnormality. Degenerative changes of the spine and AC   joints. Old right rib fracture deformities.    IMPRESSION:    Unremarkable frontal chest x ray    --- End of Report ---            THOMAS ELLIOTT MD; Attending Radiologist  This document has been electronically signed. May 29 2023  1:01PM    < end of copied text >        ROS  [  ] UNABLE TO ELICIT               HPI:  62 y/o M, A&Ox3, bedbound, chronic daley catheter with CKD (baseline creat 3.8), COPD, CHFpEF, BPH, PVD, Lymphedema, decubitus ulcers (Sacrum/gluteal) , Anemia, poly-neuropathy, poly-arthritis, hypothyroidism, seizures and Bipolar disorder was BIB EMS from [place] for abdominal pain, [place]. Mr. Rhoades states that he developed acute onset sharp abdominal pain 4 days ago a/w "brown" emesis (witnessed by EMS). Unable to tolerate food/drink x 4 days. Having normal bowel movements, last this morning . Denies any fevers. NH paperwork states concern for obstruction.  Patient endorses BLOODY EMESIS x 4 days upon further questioning. (29 May 2023 01:36)      History as above, asked to see this patient who is from a NH and was found to have Gram Negative Bacteremia which has been identified as Pseudomonas, he has no fevers or chills , he has a chronic indwelling daley and his urine culture is contaminated. Pt presents with abdominal pain and bloody vomitus x 4 days and unable to tolerate food. He had an EGD but apparently nothing was found.        PAST MEDICAL & SURGICAL HISTORY:  Hypothyroid      Hyperlipidemia      Ambulatory dysfunction      Anemia      History of BPH      CKD (chronic kidney disease)      Chronic obstructive pulmonary disease (COPD)      Bipolar disorder      HLD (hyperlipidemia)      BPH (benign prostatic hyperplasia)      Chronic diastolic congestive heart failure      Lymphedema      Chronic leg pain      No significant past surgical history          No Known Allergies      Meds:  albuterol    90 MICROgram(s) HFA Inhaler 2 Puff(s) Inhalation every 6 hours  buPROPion XL (24-Hour) . 150 milliGRAM(s) Oral daily  cefepime   IVPB      chlorhexidine 2% Cloths 1 Application(s) Topical <User Schedule>  divalproex  milliGRAM(s) Oral two times a day  dorzolamide 2% Ophthalmic Solution 1 Drop(s) Right EYE <User Schedule>  finasteride 5 milliGRAM(s) Oral daily  heparin   Injectable 5000 Unit(s) SubCutaneous every 8 hours  levothyroxine 125 MICROGram(s) Oral daily  metoclopramide Injectable 5 milliGRAM(s) IV Push every 6 hours PRN  mupirocin 2% Ointment 1 Application(s) Both Nostrils two times a day  pantoprazole    Tablet 40 milliGRAM(s) Oral two times a day  sucralfate suspension 1 Gram(s) Oral four times a day  tamsulosin 0.4 milliGRAM(s) Oral at bedtime  traZODone 50 milliGRAM(s) Oral at bedtime  vitamin A &amp; D Ointment 1 Application(s) Topical at bedtime      SOCIAL HISTORY:  Smoker:  no  ETOH use:  no      FAMILY HISTORY: not contributory      VITALS:  Vital Signs Last 24 Hrs  T(C): 36.6 (30 May 2023 15:54), Max: 36.9 (30 May 2023 14:12)  T(F): 97.9 (30 May 2023 15:54), Max: 98.4 (30 May 2023 14:12)  HR: 92 (30 May 2023 15:54) (84 - 102)  BP: 99/58 (30 May 2023 15:54) (90/67 - 107/53)  BP(mean): --  RR: 18 (30 May 2023 15:54) (16 - 20)  SpO2: 100% (30 May 2023 15:54) (98% - 100%)    Parameters below as of 30 May 2023 15:54  Patient On (Oxygen Delivery Method): room air        LABS/DIAGNOSTIC TESTS:                          8.5    6.99  )-----------( 247      ( 30 May 2023 06:53 )             27.5     WBC Count: 6.99 K/uL ( @ 06:53)  WBC Count: 6.46 K/uL ( @ 22:40)  WBC Count: 7.48 K/uL ( @ 17:10)  WBC Count: 6.73 K/uL ( @ 13:53)  WBC Count: 7.40 K/uL ( @ 04:50)  WBC Count: 7.73 K/uL ( @ 21:02)          142  |  115<H>  |  84<H>  ----------------------------<  68<L>  3.4<L>   |  12<L>  |  3.81<H>    Ca    10.3      30 May 2023 06:53  Phos  3.2       Mg     2.8         TPro  5.4<L>  /  Alb  2.0<L>  /  TBili  0.2  /  DBili  x   /  AST  5<L>  /  ALT  <6<L>  /  AlkPhos  86        Urinalysis Basic - ( 28 May 2023 21:02 )    Color: Pale Yellow / Appearance: Slightly Turbid / S.010 / pH: x  Gluc: x / Ketone: Trace  / Bili: Negative / Urobili: Negative   Blood: x / Protein: 100 mg/dL / Nitrite: Negative   Leuk Esterase: Moderate / RBC: 10-25 /HPF / WBC 26-50 /HPF   Sq Epi: x / Non Sq Epi: x / Bacteria: TNTC /HPF        LIVER FUNCTIONS - ( 30 May 2023 06:53 )  Alb: 2.0 g/dL / Pro: 5.4 g/dL / ALK PHOS: 86 U/L / ALT: <6 U/L DA / AST: 5 U/L / GGT: x             PT/INR - ( 28 May 2023 21:02 )   PT: 11.4 sec;   INR: 0.96 ratio         PTT - ( 28 May 2023 21:02 )  PTT:32.5 sec    LACTATE:    ABG -     CULTURES:       Specimen Source:   .Blood Blood-Peripheral (23 @ 21:09) - Pseudomonas aeruginosa: Detec (23 @ 21:09)       .Blood Blood-Peripheral   @ 21:09   No growth to date.  --  Blood Culture PCR      Clean Catch Clean Catch (Midstream)   @ 21:02   >=3 organisms. Probable collection contamination.  --  --          RADIOLOGY:< from: CT Abdomen and Pelvis No Cont (23 @ 22:34) >  ACC: [id number] EXAM:  CT ABDOMEN AND PELVIS   ORDERED BY: GERHARD REDDY     PROCEDURE DATE:  2023          INTERPRETATION:  CLINICAL INFORMATION: Abdominal pain and vomiting. Rule   out obstruction.    COMPARISON: CT of the abdomen and pelvis from 2022.    CONTRAST/COMPLICATIONS:  IV Contrast: NONE  Evaluation of the visceral organs is limited without   intravenous contrast  Oral Contrast: NONE  Complications: None reported at time of study completion    PROCEDURE:  CT of the Abdomen and Pelvis was performed.  Sagittal and coronal reformats were performed.    FINDINGS:  LOWER CHEST: Bibasilar subsegmental atelectasis. Circumferential wall   thickening of the distal esophagus.    LIVER: Within normal limits.  BILE DUCTS: Normal caliber.  GALLBLADDER: Cholecystectomy.  SPLEEN: Within normal limits.  PANCREAS: Within normal limits.  ADRENALS: Within normal limits.  KIDNEYS/URETERS: Redemonstrated moderate nonspecific bilateral   perinephric fat stranding. No hydronephrosis.    BLADDER: Collapsed around a Daley catheter balloon. Intravesicular gas   secondary to instrumentation. Circumferential wall thickening.  REPRODUCTIVE ORGANS: Prostate within normal limits.    BOWEL: No bowel obstruction or inflammation. Scattered colonic   diverticulosis without diverticulitis. Appendix is normal.  PERITONEUM: No ascites.  VESSELS: Infrarenal IVC filter.  RETROPERITONEUM/LYMPH NODES: No lymphadenopathy.  ABDOMINAL WALL: Small bilateral fat-containing inguinal hernias.  BONES: Old right rib fractures. Severe right hip degenerative change with   remodeling of the right femoral head. Degenerative changes of the spine.    IMPRESSION:  1. No bowel obstruction or inflammation.  2. Circumferential wall thickening of the distal esophagus which could be   due to an esophagitis.  3. Circumferential urinary bladder wall thickening which could be due to   underdistention versus a cystitis. Correlate with urinalysis.        --- End of Report ---            GERHARD HAGAN MD; Attending Radiologist  This document has been electronically signed. May 28 2023 11:30PM    < end of copied text >  ------------------------------------------------------------------------------------------------------------------------------------------------------------------------------------------  ACC: [id number] EXAM:  XR CHEST PORTABLE URGENT 1V   ORDERED BY: GERHARD REDDY     PROCEDURE DATE:  2023          INTERPRETATION:  CLINICAL STATEMENT: Abdominal pain. Rule out infiltrate    TECHNIQUE: AP view of the chest.      COMPARISON: 2022    The cardiomediastinal silhouette is normal and the anthony are not enlarged.   The trachea is midline. Suboptimal degree of inspiration with   bronchovascular crowding. Mild platelike atelectatic change right base.   There is no focal lung consolidation or sizable pleural effusion. No   significant osseous abnormality. Degenerative changes of the spine and AC   joints. Old right rib fracture deformities.    IMPRESSION:    Unremarkable frontal chest x ray    --- End of Report ---            THOMAS ELLIOTT MD; Attending Radiologist  This document has been electronically signed. May 29 2023  1:01PM    < end of copied text >        ROS  [  ] UNABLE TO ELICIT

## 2023-05-30 NOTE — PROGRESS NOTE ADULT - ASSESSMENT
Patient is a 63 year old Male, from [place], [facility], ambulates with walker , chronic FC, h/o obesity, HTN, HLD, PVD, Seizures, CKD4 (base SCr 3.8), anemia, BPH, Lymphedema, hypothyroidism, HFpEF, possible COPD, decubitus ulcer (sacrum/gluteal), polyneuropathy, polyarthritis, bipolar disorder. Patient presented to ED for abdominal pain, nausea and vomiting brown emesis. Patient admitted to ICU for Hypotension likely from GIB and Sepsis secondary to complicated UTI. CT head was negative for acute changes. CT abd showed circumferential wall thickening of distal esophagus. Patient with Prolonged QT, will repeat EKG, held Reglan and Zofran. GI consulted recommending endoscopy.  Blood culture resulting Gram negative rods, on cefepime, Urine culture testing, likely will be colonized. Patient with pressure injurt pending wound care consult. Nephrology consulted for SHEYLA on CKD.        Patient is a 63 year old Male, from [place] [id number], ambulates with walker , chronic FC, h/o obesity, HTN, HLD, PVD, Seizures, CKD4 (base SCr 3.8), anemia, BPH, Lymphedema, hypothyroidism, HFpEF, possible COPD, decubitus ulcer (sacrum/gluteal), polyneuropathy, polyarthritis, bipolar disorder. Patient presented to ED for abdominal pain, nausea and vomiting brown emesis. Patient admitted to ICU for Hypotension likely from Sepsis secondary to complicated UTI and esophagitis. CT head was negative for acute changes. CT abd showed circumferential wall thickening of distal esophagus. GI consulted recommending endoscopy for esophagitis.  Blood culture resulting Gram negative rods, on cefepime, Urine culture testing, likely will be colonized. Patient with pressure injury pending wound care consult. Nephrology consulted for SHEYLA on CKD.

## 2023-05-30 NOTE — CONSULT NOTE ADULT - SUBJECTIVE AND OBJECTIVE BOX
HPI:  62 y/o M, A&Ox3, bedbound, chronic daley catheter with CKD (baseline creat 3.8), COPD, CHFpEF, BPH, PVD, Lymphedema, decubitus ulcers (Sacrum/gluteal) , Anemia, poly-neuropathy, poly-arthritis, hypothyroidism, seizures and Bipolar disorder was BIB EMS from [place] for abdominal pain, [place]. Mr. Rhoades states that he developed acute onset sharp abdominal pain 4 days ago a/w "brown" emesis (witnessed by EMS). Unable to tolerate food/drink x 4 days. Having normal bowel movements, last this morning 5/28. Denies any fevers. NH paperwork states concern for obstruction.  Patient endorses BLOODY EMESIS x 4 days upon further questioning. (29 May 2023 01:36)      PAST MEDICAL & SURGICAL HISTORY:  Hypothyroid      Hyperlipidemia      Ambulatory dysfunction      Anemia      History of BPH      CKD (chronic kidney disease)      Chronic obstructive pulmonary disease (COPD)      Bipolar disorder      HLD (hyperlipidemia)      BPH (benign prostatic hyperplasia)      Chronic diastolic congestive heart failure      Lymphedema      Chronic leg pain      No significant past surgical history          No Known Allergies      Social Hx:    FAMILY HISTORY:      albuterol    90 MICROgram(s) HFA Inhaler 2 Puff(s) Inhalation every 6 hours  buPROPion XL (24-Hour) . 150 milliGRAM(s) Oral daily  cefepime   IVPB      chlorhexidine 2% Cloths 1 Application(s) Topical <User Schedule>  divalproex  milliGRAM(s) Oral two times a day  dorzolamide 2% Ophthalmic Solution 1 Drop(s) Right EYE <User Schedule>  finasteride 5 milliGRAM(s) Oral daily  heparin   Injectable 5000 Unit(s) SubCutaneous every 8 hours  levothyroxine 125 MICROGram(s) Oral daily  metoclopramide Injectable 5 milliGRAM(s) IV Push every 6 hours PRN  mupirocin 2% Ointment 1 Application(s) Both Nostrils two times a day  pantoprazole    Tablet 40 milliGRAM(s) Oral two times a day  sucralfate suspension 1 Gram(s) Oral four times a day  tamsulosin 0.4 milliGRAM(s) Oral at bedtime  traZODone 50 milliGRAM(s) Oral at bedtime  vitamin A &amp; D Ointment 1 Application(s) Topical at bedtime      MEDICATIONS  (PRN):  metoclopramide Injectable 5 milliGRAM(s) IV Push every 6 hours PRN nausea/vomit      T(C): 36.4 (05-30-23 @ 21:44), Max: 36.9 (05-30-23 @ 14:12)  HR: 87 (05-30-23 @ 21:44) (87 - 102)  BP: 96/46 (05-30-23 @ 21:44) (90/67 - 105/55)  RR: 18 (05-30-23 @ 21:44) (16 - 20)  SpO2: 100% (05-30-23 @ 21:44) (98% - 100%)        REVIEW OF SYSTEMS:                           ALL ROS DONE [ X   ]    CONSTITUTIONAL:  LETHARGIC [   ], FEVER [   ], UNRESPONSIVE [   ]  CVS:  CP  [   ], SOB, [   ], PALPITATIONS [   ], DIZZYNESS [   ]  RS: COUGH [   ], SPUTUM [   ]  GI: ABDOMINAL PAIN [   ], NAUSEA [   ], VOMITINGS [   ], DIARRHEA [   ], CONSTIPATION [   ]  :  DYSURIA [   ], NOCTURIA [   ], INCREASED FREQUENCY [   ], DRIBLING [   ],  SKELETAL: PAINFUL JOINTS [   ], SWOLLEN JOINTS [   ], NECK ACHE [   ], LOW BACK ACHE [   ],  SKIN : ULCERS [   ], RASH [   ], ITCHING [   ]  CNS: HEAD ACHE [   ], DOUBLE VISION [   ], BLURRED VISION [   ], AMS / CONFUSION [   ], SEIZURES [   ], WEAKNESS [   ],TINGLING / NUMBNESS [   ]    PHYSICAL EXAMINATION:  GENERAL APPEARANCE: NO DISTRESS  HEENT:  NO PALLOR, NO  JVD,  NO   NODES, NECK SUPPLE  CVS: S1 +, S2 +,   RS: AEEB,  OCCASIONAL  RALES +,   NO RONCHI  ABD: SOFT, NT, NO, BS +  EXT: NO PE  SKIN: WARM,   SKELETAL:  ROM ACCEPTABLE  CNS:  AAO X    ,   DEFICITS    RADIOLOGY :      ASSESSMENT :       PLAN:  HPI:  62 y/o M, A&Ox3, bedbound, chronic daley catheter with CKD (baseline creat 3.8), COPD, CHFpEF, BPH, PVD, Lymphedema, decubitus ulcers (Sacrum/gluteal) , Anemia, poly-neuropathy, poly-arthritis, hypothyroidism, seizures and Bipolar disorder was BIB EMS from [place] for abdominal pain, [place]. Mr. Rhoades states that he developed acute onset sharp abdominal pain 4 days ago a/w "brown" emesis (witnessed by EMS). Unable to tolerate food/drink x 4 days. Having normal bowel movements, last this morning 5/28. Denies any fevers. NH paperwork states concern for obstruction.  Patient endorses BLOODY EMESIS x 4 days upon further questioning. (29 May 2023 01:36)    -      HPI:  62 y/o M, A&Ox3, bedbound, chronic daley catheter with CKD (baseline creat 3.8), COPD, CHFpEF, BPH, PVD, Lymphedema, decubitus ulcers (Sacrum/gluteal) , Anemia, poly-neuropathy, poly-arthritis, hypothyroidism, seizures and Bipolar disorder was BIB EMS from [place] for abdominal pain, [place]. Mr. Rhoades states that he developed acute onset sharp abdominal pain 4 days ago a/w "brown" emesis (witnessed by EMS). Unable to tolerate food/drink x 4 days. Having normal bowel movements, last this morning 5/28. Denies any fevers. NH paperwork states concern for obstruction.  (29 May 2023 01:36)      PAST MEDICAL & SURGICAL HISTORY:  Hypothyroid  Hyperlipidemia  Ambulatory dysfunction  Anemia  History of BPH  CKD (chronic kidney disease)  Chronic obstructive pulmonary disease (COPD)  Bipolar disorder  HLD (hyperlipidemia)  BPH (benign prostatic hyperplasia)  Chronic diastolic congestive heart failure  Lymphedema  Chronic leg pain    ALLERGIES:  No Known Allergies      Social Hx: FORMER SMOKER    MEDICATIONS:  albuterol    90 MICROgram(s) HFA Inhaler 2 Puff(s) Inhalation every 6 hours  buPROPion XL (24-Hour) . 150 milliGRAM(s) Oral daily  cefepime   IVPB      chlorhexidine 2% Cloths 1 Application(s) Topical <User Schedule>  divalproex  milliGRAM(s) Oral two times a day  dorzolamide 2% Ophthalmic Solution 1 Drop(s) Right EYE <User Schedule>  finasteride 5 milliGRAM(s) Oral daily  heparin   Injectable 5000 Unit(s) SubCutaneous every 8 hours  levothyroxine 125 MICROGram(s) Oral daily  metoclopramide Injectable 5 milliGRAM(s) IV Push every 6 hours PRN  mupirocin 2% Ointment 1 Application(s) Both Nostrils two times a day  pantoprazole    Tablet 40 milliGRAM(s) Oral two times a day  sucralfate suspension 1 Gram(s) Oral four times a day  tamsulosin 0.4 milliGRAM(s) Oral at bedtime  traZODone 50 milliGRAM(s) Oral at bedtime  vitamin A &amp; D Ointment 1 Application(s) Topical at bedtime      MEDICATIONS  (PRN):  metoclopramide Injectable 5 milliGRAM(s) IV Push every 6 hours PRN nausea/vomit      T(C): 36.4 (05-30-23 @ 21:44), Max: 36.9 (05-30-23 @ 14:12)  HR: 87 (05-30-23 @ 21:44) (87 - 102)  BP: 96/46 (05-30-23 @ 21:44) (90/67 - 105/55)  RR: 18 (05-30-23 @ 21:44) (16 - 20)  SpO2: 100% (05-30-23 @ 21:44) (98% - 100%)        REVIEW OF SYSTEMS:                           ALL ROS DONE [ X   ]    CONSTITUTIONAL:  LETHARGIC [   ], FEVER [   ], UNRESPONSIVE [   ]  CVS:  CP  [   ], SOB, [   ], PALPITATIONS [   ], DIZZYNESS [   ]  RS: COUGH [   ], SPUTUM [   ]  GI: ABDOMINAL PAIN [   ], NAUSEA [   ], VOMITINGS [   ], DIARRHEA [   ], CONSTIPATION [   ]  :  DYSURIA [   ], NOCTURIA [   ], INCREASED FREQUENCY [   ], DRIBLING [   ],  SKELETAL: PAINFUL JOINTS [   ], SWOLLEN JOINTS [   ], NECK ACHE [   ], LOW BACK ACHE [   ],  SKIN : ULCERS [   ], RASH [   ], ITCHING [   ]  CNS: HEAD ACHE [   ], DOUBLE VISION [   ], BLURRED VISION [   ], AMS / CONFUSION [   ], SEIZURES [   ], WEAKNESS [   ],TINGLING / NUMBNESS [   ]    PHYSICAL EXAMINATION:  GENERAL APPEARANCE: NO DISTRESS  HEENT:  NO PALLOR, NO  JVD,  NO   NODES, NECK SUPPLE  CVS: S1 +, S2 +,   RS: AEEB,  OCCASIONAL  RALES +,   NO RONCHI  ABD: SOFT, NT, NO, BS +  EXT: PE+  SKIN: WARM,   SKELETAL:  ROM ACCEPTABLE  CNS:  AAO X 2-3    RADIOLOGY :    RADIOLOGY AND READINGS REVIEWED    ASSESSMENT :       PLAN:  HPI:  62 y/o M, A&Ox3, bedbound, chronic daley catheter with CKD (baseline creat 3.8), COPD, CHFpEF, BPH, PVD, Lymphedema, decubitus ulcers (Sacrum/gluteal) , Anemia, poly-neuropathy, poly-arthritis, hypothyroidism, seizures and Bipolar disorder was BIB EMS from [place] for abdominal pain, [place]. Mr. Rhoades states that he developed acute onset sharp abdominal pain 4 days ago a/w "brown" emesis (witnessed by EMS). Unable to tolerate food/drink x 4 days. Having normal bowel movements, last this morning 5/28. Denies any fevers. NH paperwork states concern for obstruction.  Patient endorses BLOODY EMESIS x 4 days upon further questioning. (29 May 2023 01:36)    # SEPSIS S/T GNR BACTEREMIA + COMPLICATED UTI  - ON CEFEPIME, F/U BCX AND UCX  - ID CONSULT  - CRITICAL CARE EVALUATION IN PROGRESS    # R/O GI BLEED  # ESOPHAGITIS  # ANEMIA OF CHRONIC DISEASE - MONITOR HGB, TRANSFUSION THRESHOLD HGB < 8  - S/P EGD - ULCERATIVE ESOPHAGITIS  - PPI BID  - CARAFATE QID  - GI CONSULT    # CHRONIC HYPOXIC RESPIRATORY FAILURE S/T UNDERLYING COPD  - ON PRN O2  - CRITICAL CARE EVALUATION IN PROGRESS    # SHEYLA ON CKD   # CHRONIC DALEY, BPH  - DALEY   - STRICT IS AND OS  - AVOID NEPHROTOXIC AGENTS  - MONITOR CR  - NEPHROLOGY CONSULT    # AMBULATORY DYSFUNCTION, IMPAIRED GAIT S/T OA, PVD, PERIPHERAL NEUROPATHY  - FALL PRECAUTIONS    # DYSPHAGIA - ON MODIFIED DIET PER SWALLOW EVAL    # HX OF SEIZURE DX  - ON VALPROIC ACID    # HYPOTHYROIDISM - ON LEVOTHYROXINE    # PRESSURE ULCERS  - PATIENT IS HIGH RISK FOR PRESSURE ULCERS DESPITE PREVENTIVE MEASURES IN PLACE  - WOUND CARE CONSULT    # GI PPX

## 2023-05-30 NOTE — CHART NOTE - NSCHARTNOTEFT_GEN_A_CORE
Spoke with Daija Ramey sister [phone]. Patient give permission to speak with sister. Explained current condition. Encouraged family to address concerns and emotional support given, all concerns and questions addressed.

## 2023-05-30 NOTE — PROGRESS NOTE ADULT - PROBLEM SELECTOR PLAN 1
no active bleeding  continue PPI  NPO  f/u endoscope today secondary to Bacteremia and complicated UTI  blood culture positive- Gram negative rods- Pseudomonas  f/u Urine culture   f/u repeat blood culture  continue Cefepime

## 2023-05-30 NOTE — PROGRESS NOTE ADULT - PROBLEM SELECTOR PLAN 7
continue wellbutrin-x continue WELLBUTRIN  continue Trazadone continiue chronic daley  changed on 5/28 in ED

## 2023-05-30 NOTE — CONSULT NOTE ADULT - ASSESSMENT
Bacteremia - with Pseudomonas  UTI - complicated      Plan - Cont Maxipime 1 gm iv q12hrs  repeat blood cultures.

## 2023-05-30 NOTE — PHARMACOTHERAPY INTERVENTION NOTE - COMMENTS
Recommended to adjust cefepime dose from 1g IV q24h to 1g IV q12h since the CrCl is 19 mL/min and patient is being treated for Pseudomonas aeruginosa bacteremia.     Jerica Barcenas, PharmD, [facility]  Clinical Pharmacy Specialist, Infectious Diseases  Tele-Antimicrobial Stewardship Program (Tele-ASP)  Tele-ASP Phone: [phone]

## 2023-05-30 NOTE — PROGRESS NOTE ADULT - SUBJECTIVE AND OBJECTIVE BOX
GAYLA PEARCE    SCU progress note    INTERVAL HPI/OVERNIGHT EVENTS: *** Patient downgraded to SCU from ICU. Patient with no overnight events.     DNR [ ]   DNI  [  ] Full Code    Covid - 19 PCR: 23 negative     The 4Ms    What Matters Most: see GOC  Age appropriate Medications/Screen for High Risk Medication: Yes  Mentation: see CAM below  Mobility: defer to physical exam    The Confusion Assessment Method (CAM) Diagnostic Algorithm     1: Acute Onset or Fluctuating Course  - Is there evidence of an acute change in mental status from the patient’s baseline? Did the (abnormal) behavior  fluctuate during the day, that is, tend to come and go, or increase and decrease in severity?  [ ] YES [x ] NO     2: Inattention  - Did the patient have difficulty focusing attention, being easily distractible, or having difficulty keeping track of what was being said?  [ ] YES [ x] NO     3: Disorganized thinking  -Was the patient’s thinking disorganized or incoherent, such as rambling or irrelevant conversation, unclear or illogical flow of ideas, or unpredictable switching from subject to subject?  [ ] YES [ x] NO    4: Altered Level of consciousness?  [ ] YES [x ] NO    The diagnosis of delirium by CAM requires the presence of features 1 and 2 and either 3 or 4.    PRESSORS: [ ] YES [ x] NO  cefepime   IVPB 1000 milliGRAM(s) IV Intermittent every 24 hours    Cardiovascular:  Heart Failure  Acute   Acute on Chronic  Chronic         Pulmonary:  albuterol    90 MICROgram(s) HFA Inhaler 2 Puff(s) Inhalation every 6 hours    Hematalogic:  heparin   Injectable 5000 Unit(s) SubCutaneous every 8 hours    Other:  albumin human  5% IVPB 250 milliLiter(s) IV Intermittent every 2 hours  buPROPion XL (24-Hour) . 150 milliGRAM(s) Oral daily  chlorhexidine 2% Cloths 1 Application(s) Topical <User Schedule>  dorzolamide 2% Ophthalmic Solution 1 Drop(s) Right EYE <User Schedule>  finasteride 5 milliGRAM(s) Oral daily  levothyroxine Injectable 95 MICROGram(s) IV Push at bedtime  metoclopramide Injectable 5 milliGRAM(s) IV Push every 6 hours PRN  mupirocin 2% Ointment 1 Application(s) Both Nostrils two times a day  pantoprazole  Injectable 40 milliGRAM(s) IV Push two times a day  sucralfate suspension 1 Gram(s) Oral four times a day  tamsulosin 0.4 milliGRAM(s) Oral at bedtime  traZODone 50 milliGRAM(s) Oral at bedtime  valproate sodium  IVPB 500 milliGRAM(s) IV Intermittent two times a day  vitamin A &amp; D Ointment 1 Application(s) Topical at bedtime    albumin human  5% IVPB 250 milliLiter(s) IV Intermittent every 2 hours  albuterol    90 MICROgram(s) HFA Inhaler 2 Puff(s) Inhalation every 6 hours  buPROPion XL (24-Hour) . 150 milliGRAM(s) Oral daily  cefepime   IVPB 1000 milliGRAM(s) IV Intermittent every 24 hours  chlorhexidine 2% Cloths 1 Application(s) Topical <User Schedule>  dorzolamide 2% Ophthalmic Solution 1 Drop(s) Right EYE <User Schedule>  finasteride 5 milliGRAM(s) Oral daily  heparin   Injectable 5000 Unit(s) SubCutaneous every 8 hours  levothyroxine Injectable 95 MICROGram(s) IV Push at bedtime  metoclopramide Injectable 5 milliGRAM(s) IV Push every 6 hours PRN  mupirocin 2% Ointment 1 Application(s) Both Nostrils two times a day  pantoprazole  Injectable 40 milliGRAM(s) IV Push two times a day  sucralfate suspension 1 Gram(s) Oral four times a day  tamsulosin 0.4 milliGRAM(s) Oral at bedtime  traZODone 50 milliGRAM(s) Oral at bedtime  valproate sodium  IVPB 500 milliGRAM(s) IV Intermittent two times a day  vitamin A &amp; D Ointment 1 Application(s) Topical at bedtime    Drug Dosing Weight  Height (cm): 172.7 (28 May 2023 19:50)  Weight (kg): 83.9 (28 May 2023 19:50)  BMI (kg/m2): 28.1 (28 May 2023 19:50)  BSA (m2): 1.98 (28 May 2023 19:50)    CENTRAL LINE: [ ] YES [ x] NO  LOCATION:   DATE INSERTED:  REMOVE: [ ] YES [ ] NO  EXPLAIN:    DALEY: [x ] YES [ ] NO    DATE INSERTED: 23  REMOVE:  [ ] YES [x ] NO  EXPLAIN: chronic daley    PAST MEDICAL & SURGICAL HISTORY:  Hypothyroid      Hyperlipidemia      Ambulatory dysfunction      Anemia      History of BPH      CKD (chronic kidney disease)      Chronic obstructive pulmonary disease (COPD)      Bipolar disorder      HLD (hyperlipidemia)      BPH (benign prostatic hyperplasia)      Chronic diastolic congestive heart failure      Lymphedema      Chronic leg pain      No significant past surgical history       @ 07:01  -   @ 07:00  --------------------------------------------------------  IN: 1740 mL / OUT: 560 mL / NET: 1180 mL    PHYSICAL EXAM:    GENERAL: NAD, well-groomed, well-developed  HEAD:  Atraumatic, Normocephalic  EYES: EOMI, PERRLA, conjunctiva and sclera clear  ENMT: No tonsillar erythema, exudates, or enlargement; Moist mucous membranes, Good dentition, No lesions  NECK: Supple, No JVD, Normal thyroid  NERVOUS SYSTEM:  Alert & Oriented X3, Good concentration; Motor Strength 4/5 B/L upper and lower extremities  CHEST/LUNG: bibasilar crackles; No rales, rhonchi, wheezing, or rubs  HEART: Regular rate and rhythm; No murmurs, rubs, or gallops  ABDOMEN: Soft, Nontender, Nondistended; Bowel sounds present  EXTREMITIES:  2+ Peripheral Pulses, No clubbing, cyanosis, or edema  LYMPH: No lymphadenopathy noted  SKIN: Pressure injury stage 2 left buttocks, stage 1 bilateral glutes     LABS:  CBC Full  -  ( 30 May 2023 06:53 )  WBC Count : 6.99 K/uL  RBC Count : 2.76 M/uL  Hemoglobin : 8.5 g/dL  Hematocrit : 27.5 %  Platelet Count - Automated : 247 K/uL  Mean Cell Volume : 99.6 fl  Mean Cell Hemoglobin : 30.8 pg  Mean Cell Hemoglobin Concentration : 30.9 gm/dL  Auto Neutrophil # : x  Auto Lymphocyte # : x  Auto Monocyte # : x  Auto Eosinophil # : x  Auto Basophil # : x  Auto Neutrophil % : x  Auto Lymphocyte % : x  Auto Monocyte % : x  Auto Eosinophil % : x  Auto Basophil % : x        142  |  115<H>  |  84<H>  ----------------------------<  68<L>  3.4<L>   |  12<L>  |  3.81<H>    Ca    10.3      30 May 2023 06:53  Phos  3.2       Mg     2.8         TPro  5.4<L>  /  Alb  2.0<L>  /  TBili  0.2  /  DBili  x   /  AST  5<L>  /  ALT  <6<L>  /  AlkPhos  86      PT/INR - ( 28 May 2023 21:02 )   PT: 11.4 sec;   INR: 0.96 ratio         PTT - ( 28 May 2023 21:02 )  PTT:32.5 sec  Urinalysis Basic - ( 28 May 2023 21:02 )    Color: Pale Yellow / Appearance: Slightly Turbid / S.010 / pH: x  Gluc: x / Ketone: Trace  / Bili: Negative / Urobili: Negative   Blood: x / Protein: 100 mg/dL / Nitrite: Negative   Leuk Esterase: Moderate / RBC: 10-25 /HPF / WBC 26-50 /HPF   Sq Epi: x / Non Sq Epi: x / Bacteria: TNTC /HPF      [x  ]  DVT Prophylaxis  [  ]  Nutrition, Brand, Rate         Goal Rate        Abnormal Nutritional Status -  Malnutrition   Cachexia       RADIOLOGY & ADDITIONAL STUDIES:  ***  < from: CT Abdomen and Pelvis No Cont (23 @ 22:34) >  PROCEDURE:  CT of the Abdomen and Pelvis was performed.  Sagittal and coronal reformats were performed.    FINDINGS:  LOWER CHEST: Bibasilar subsegmental atelectasis. Circumferential wall   thickening of the distal esophagus.    LIVER: Within normal limits.  BILE DUCTS: Normal caliber.  GALLBLADDER: Cholecystectomy.  SPLEEN: Within normal limits.  PANCREAS: Within normal limits.  ADRENALS: Within normal limits.  KIDNEYS/URETERS: Redemonstrated moderate nonspecific bilateral   perinephric fat stranding. No hydronephrosis.    BLADDER: Collapsed around a Daley catheter balloon. Intravesicular gas   secondary to instrumentation. Circumferential wall thickening.  REPRODUCTIVE ORGANS: Prostate within normal limits.    BOWEL: No bowel obstruction or inflammation. Scattered colonic   diverticulosis without diverticulitis. Appendix is normal.  PERITONEUM: No ascites.  VESSELS: Infrarenal IVC filter.  RETROPERITONEUM/LYMPH NODES: No lymphadenopathy.  ABDOMINAL WALL: Small bilateral fat-containing inguinal hernias.  BONES: Old right rib fractures. Severe right hip degenerative change with   remodeling of the right femoral head. Degenerative changes of the spine.    IMPRESSION:  1. No bowel obstruction or inflammation.  2. Circumferential wall thickening of the distal esophagus which could be   due to an esophagitis.  3. Circumferential urinary bladder wall thickening which could be due to   underdistention versus a cystitis. Correlate with urinalysis.        --- End of Report ---    < end of copied text >  < from: CT Head No Cont (23 @ 22:33) >  TECHNIQUE:  Noncontrast CT of the head was performed.  Sagittal and coronal reformats were created.    COMPARISON STUDY: 2022    FINDINGS:    PARENCHYMA AND VENTRICLES: No acute intracranial hemorrhage, mass effect,   or midline shift. No hydrocephalus. Prominence of the sulci and   ventricles, consistent with age-related parenchymal volume loss; the   extent of ventricular and sulcal prominence appears somewhat increased   compared to 2022. Small vessel white matter changes.  EXTRA-AXIAL: No abnormal extraaxial collection.  PARANASAL SINUSES: Layering fluid in the right sphenoid sinus and   additional mild scattered mucosal thickening, as on prior.  TYMPANOMASTOID CAVITIES: Within normal limits.  ORBITS: Bilateral cataract surgery.  CALVARIUM: Within normal limits.  MISCELLANEOUS: Intracranial atherosclerosis.    IMPRESSION:  No acute intracranial hemorrhage, mass effect, or midline shift.  Paranasal sinus mucosal disease.  The extent of ventricular and sulcal prominence appears increased   compared with 2022, indeterminate clinical significance.    --- End of Report ---    < end of copied text >    Goals of Care Discussion with Family/Proxy/Other

## 2023-05-30 NOTE — CONSULT NOTE ADULT - SUBJECTIVE AND OBJECTIVE BOX
[facility] GI CONSULTATION    Patient is a 63y old  Male who presents with a chief complaint of sepsis (30 May 2023 09:59)    HPI:  62 y/o M, A&Ox3, bedbound, chronic daley catheter with CKD (baseline creat 3.8), COPD, CHFpEF, BPH, PVD, Lymphedema, decubitus ulcers (Sacrum/gluteal) , Anemia, poly-neuropathy, poly-arthritis, hypothyroidism, seizures and Bipolar disorder was BIB EMS from [place] for abdominal pain, [place]. Mr. Rhoades states that he developed acute onset sharp abdominal pain 4 days ago a/w "brown" emesis (witnessed by EMS). Unable to tolerate food/drink x 4 days. Having normal bowel movements, last this morning 5/28. Denies any fevers. NH paperwork states concern for obstruction.  Patient endorses BLOODY EMESIS x 4 days upon further questioning. (29 May 2023 01:36)      GI HPI:  Resting comfortably in bed.  NPO for EGD today.  Denies pain or vomiting.  Received medication for nausea this morning.      PMH/PSH:  PAST MEDICAL & SURGICAL HISTORY:  Hypothyroid      Hyperlipidemia      Ambulatory dysfunction      Anemia      History of BPH      CKD (chronic kidney disease)      Chronic obstructive pulmonary disease (COPD)      Bipolar disorder      HLD (hyperlipidemia)      BPH (benign prostatic hyperplasia)      Chronic diastolic congestive heart failure      Lymphedema      Chronic leg pain      No significant past surgical history        FH:  FAMILY HISTORY:      MEDS:  MEDICATIONS  (STANDING):  albumin human  5% IVPB 250 milliLiter(s) IV Intermittent every 2 hours  albuterol    90 MICROgram(s) HFA Inhaler 2 Puff(s) Inhalation every 6 hours  buPROPion XL (24-Hour) . 150 milliGRAM(s) Oral daily  cefepime   IVPB 1000 milliGRAM(s) IV Intermittent every 24 hours  chlorhexidine 2% Cloths 1 Application(s) Topical <User Schedule>  dorzolamide 2% Ophthalmic Solution 1 Drop(s) Right EYE <User Schedule>  finasteride 5 milliGRAM(s) Oral daily  heparin   Injectable 5000 Unit(s) SubCutaneous every 8 hours  levothyroxine Injectable 95 MICROGram(s) IV Push at bedtime  mupirocin 2% Ointment 1 Application(s) Both Nostrils two times a day  pantoprazole  Injectable 40 milliGRAM(s) IV Push two times a day  sucralfate suspension 1 Gram(s) Oral four times a day  tamsulosin 0.4 milliGRAM(s) Oral at bedtime  traZODone 50 milliGRAM(s) Oral at bedtime  valproate sodium  IVPB 500 milliGRAM(s) IV Intermittent two times a day  vitamin A &amp; D Ointment 1 Application(s) Topical at bedtime    MEDICATIONS  (PRN):  metoclopramide Injectable 5 milliGRAM(s) IV Push every 6 hours PRN nausea/vomit    Allergies    No Known Allergies    Intolerances            CONSTITUTIONAL:  No weight loss, fever, chills, weakness or fatigue.  HEENT:  Eyes:  No visual loss, blurred vision, double vision or yellow sclerae. Ears, Nose, Throat:  No hearing loss, sneezing, congestion, runny nose or sore throat.  SKIN:  No rash or itching.  CARDIOVASCULAR:  No chest pain, chest pressure or chest discomfort. No palpitations or edema.  RESPIRATORY:  No shortness of breath, cough or sputum.  GASTROINTESTINAL:  SEE HPI  GENITOURINARY:  No dysuria, hematuria, urinary frequency  NEUROLOGICAL:  No headache, dizziness, syncope, paralysis, ataxia, numbness or tingling in the extremities. No change in bowel or bladder control.  MUSCULOSKELETAL:  No muscle, back pain, joint pain or stiffness.  HEMATOLOGIC:  No anemia, bleeding or bruising.  LYMPHATICS:  No enlarged nodes. No history of splenectomy.  PSYCHIATRIC:  No history of depression or anxiety.  ENDOCRINOLOGIC:  No reports of sweating, cold or heat intolerance. No polyuria or polydipsia.      ______________________________________________________________________  PHYSICAL EXAM:  T(C): 36.6 (05-30-23 @ 04:57), Max: 36.6 (05-29-23 @ 21:23)  HR: 102 (05-30-23 @ 09:50)  BP: 105/55 (05-30-23 @ 09:50)  RR: 20 (05-30-23 @ 04:57)  SpO2: 100% (05-30-23 @ 09:50)  Wt(kg): --    05-29 - 05-30  --------------------------------------------------------  IN:    IV PiggyBack: 1300 mL    Oral Fluid: 440 mL  Total IN: 1740 mL    OUT:    Indwelling Catheter - Urethral (mL): 560 mL  Total OUT: 560 mL    Total NET: 1180 mL          GEN: NAD, normocephalic  CVS: S1S2+  CHEST: clear to auscultation  ABD: soft , nontender, nondistended, bowel sounds present  EXTR: no cyanosis, no clubbing, no edema  NEURO: Awake and alert; oriented .....  SKIN:  warm;  no icteric    ______________________________________________________________________  LABS:                        8.5    6.99  )-----------( 247      ( 30 May 2023 06:53 )             27.5     05-30    142  |  115<H>  |  84<H>  ----------------------------<  68<L>  3.4<L>   |  12<L>  |  3.81<H>    Ca    10.3      30 May 2023 06:53  Phos  3.2     05-30  Mg     2.8     05-30    TPro  5.4<L>  /  Alb  2.0<L>  /  TBili  0.2  /  DBili  x   /  AST  5<L>  /  ALT  <6<L>  /  AlkPhos  86  05-30    LIVER FUNCTIONS - ( 30 May 2023 06:53 )  Alb: 2.0 g/dL / Pro: 5.4 g/dL / ALK PHOS: 86 U/L / ALT: <6 U/L DA / AST: 5 U/L / GGT: x           PT/INR - ( 28 May 2023 21:02 )   PT: 11.4 sec;   INR: 0.96 ratio         PTT - ( 28 May 2023 21:02 )  PTT:32.5 sec  ____________________________________________    IMAGING:    < from: CT Abdomen and Pelvis No Cont (05.28.23 @ 22:34) >  IMPRESSION:  1. No bowel obstruction or inflammation.  2. Circumferential wall thickening of the distal esophagus which could be   due to an esophagitis.  3. Circumferential urinary bladder wall thickening which could be due to   underdistention versus a cystitis. Correlate with urinalysis.    < end of copied text >

## 2023-05-30 NOTE — PROGRESS NOTE ADULT - PROBLEM SELECTOR PLAN 9
NPO  DVT PPX NPO  DVT PXX  PT recommending TG continue Depacon  f/u valproic acid am  no active seizure

## 2023-05-30 NOTE — PROGRESS NOTE ADULT - PROBLEM SELECTOR PLAN 6
continiue chronic daley  changed on 5/28 in ED SHEYLA on CKD stage 4- likely prerenal  Serum creatinine today at baseline

## 2023-05-30 NOTE — PROGRESS NOTE ADULT - ASSESSMENT
1. SHEYLA due to hypovolemia state  -Scr is improving to 3.8mg/dL and close to baseline   -FeNa <1%. CT scan shows no hydro but perinephric stranding.  -Adjust meds to eGFR and avoid IV Gadolinium contrast,NSAIDs, and phosphate enema.  -Monitor I/O's daily.   -Monitor SMA daily.  2. CKD stage 4 most likely due to ischemic nephropathy  -baseline scr around 3.7mg/dL in Dec after ATN with renal recovery.  -Keep patient euvolemic and renal diet  -Avoid Nephrotoxic Meds/ Agents such as (NSAIDs, IV contrast, Aminoglycosides such as gentamicin, -Gadolinium contrast, Phosphate containing enemas, etc..)  -Adjust Medications according to eGFR  3. Hypotension  -bp is stable   -s/p 3L on admission  -monitor BP.  4. Mineral Bone Disease:  -phos is okay without binders  -check PTH intact in am.   5. Anemia of CKD  -Hematemesis being monitor but stable hb. EGD today.  -iron panel/ferritin noted; pt may benefit from SUREKHA.    -F/u CBC daily  -transfuse if HB < 7.0.  6. Hypokalemia:  -supplement K to keep >4.0  -monitor K.   7. UTI with chronic daley's cath.  -on iv cefepime  -f/u urine culture    Discussed the assessment and plan with Primary Team/Nurse     1. SHEYLA due to hypovolemia state  -Scr is improving to 3.8mg/dL and close to baseline   -FeNa <1%. CT scan shows no hydro but perinephric stranding.  -Adjust meds to eGFR and avoid IV Gadolinium contrast,NSAIDs, and phosphate enema.  -Monitor I/O's daily.   -Monitor SMA daily.  2. CKD stage 4 most likely due to ischemic nephropathy  -baseline scr around 3.7mg/dL in Dec after ATN with renal recovery.  -Keep patient euvolemic and renal diet  -Avoid Nephrotoxic Meds/ Agents such as (NSAIDs, IV contrast, Aminoglycosides such as gentamicin, -Gadolinium contrast, Phosphate containing enemas, etc..)  -Adjust Medications according to eGFR  3. Hypotension  -bp is stable   -s/p 3L on admission  -monitor BP.  4. Mineral Bone Disease:  -phos is okay without binders  -PTH is low and no need vitamin d analog.  5. Anemia of CKD  -Hematemesis being monitor but stable hb. EGD today.  -iron panel/ferritin noted; pt may benefit from SUREKHA.    -F/u CBC daily  -transfuse if HB < 7.0.  6. Hypokalemia:  -supplement K to keep >4.0  -monitor K.   7. UTI with chronic daley's cath.  -on iv cefepime  -f/u urine culture    Discussed the assessment and plan with Primary Team/Nurse

## 2023-05-30 NOTE — PROGRESS NOTE ADULT - PROBLEM SELECTOR PLAN 4
likely anemia of chronic disease and GIB  currently no active bleed  Hemoglobin 8.5  transfuse if Hemoglobin <7 continue synthroid

## 2023-05-31 NOTE — DISCHARGE NOTE NURSING/CASE MANAGEMENT/SOCIAL WORK - PATIENT PORTAL LINK FT
You can access the FollowMyHealth Patient Portal offered by [facility] by registering at the following website: [email or web address]. By joining [facility]’s FollowMyHealth portal, you will also be able to view your health information using other applications (apps) compatible with our system.

## 2023-05-31 NOTE — PROGRESS NOTE ADULT - PROBLEM SELECTOR PLAN 10
DVT and GI prophylaxis  F/U final culture results.  Continue Cefepime  PT rec TG  TOV in am  OOB daily.

## 2023-05-31 NOTE — PROGRESS NOTE ADULT - SUBJECTIVE AND OBJECTIVE BOX
GI PROGRESS NOTE    Patient is a 63y old  Male who presents with a chief complaint of sepsis (30 May 2023 22:51)      HPI:  64 y/o M, A&Ox3, bedbound, chronic daley catheter with CKD (baseline creat 3.8), COPD, CHFpEF, BPH, PVD, Lymphedema, decubitus ulcers (Sacrum/gluteal) , Anemia, poly-neuropathy, poly-arthritis, hypothyroidism, seizures and Bipolar disorder was BIB EMS from [place] for abdominal pain, [place]. Mr. Rhoades states that he developed acute onset sharp abdominal pain 4 days ago a/w "brown" emesis (witnessed by EMS). Unable to tolerate food/drink x 4 days. Having normal bowel movements, last this morning 5/28. Denies any fevers. NH paperwork states concern for obstruction.  Patient endorses BLOODY EMESIS x 4 days upon further questioning. (29 May 2023 01:36)      GI HPI:  Resting comfortably in bed.  Tolerating clear liquid diet.    ______________________________________________________________________  PMH/PSH:  PAST MEDICAL & SURGICAL HISTORY:  Hypothyroid      Hyperlipidemia      Ambulatory dysfunction      Anemia      History of BPH      CKD (chronic kidney disease)      Chronic obstructive pulmonary disease (COPD)      Bipolar disorder      HLD (hyperlipidemia)      BPH (benign prostatic hyperplasia)      Chronic diastolic congestive heart failure      Lymphedema      Chronic leg pain      No significant past surgical history        ______________________________________________________________________  MEDS:  MEDICATIONS  (STANDING):  albuterol    90 MICROgram(s) HFA Inhaler 2 Puff(s) Inhalation every 6 hours  buPROPion XL (24-Hour) . 150 milliGRAM(s) Oral daily  cefepime   IVPB 1000 milliGRAM(s) IV Intermittent every 12 hours  cefepime   IVPB      chlorhexidine 2% Cloths 1 Application(s) Topical <User Schedule>  divalproex  milliGRAM(s) Oral two times a day  dorzolamide 2% Ophthalmic Solution 1 Drop(s) Right EYE <User Schedule>  finasteride 5 milliGRAM(s) Oral daily  heparin   Injectable 5000 Unit(s) SubCutaneous every 8 hours  levothyroxine 125 MICROGram(s) Oral daily  mupirocin 2% Ointment 1 Application(s) Both Nostrils two times a day  pantoprazole    Tablet 40 milliGRAM(s) Oral two times a day  sucralfate suspension 1 Gram(s) Oral four times a day  tamsulosin 0.4 milliGRAM(s) Oral at bedtime  traZODone 50 milliGRAM(s) Oral at bedtime  vitamin A &amp; D Ointment 1 Application(s) Topical at bedtime    MEDICATIONS  (PRN):  metoclopramide Injectable 5 milliGRAM(s) IV Push every 6 hours PRN nausea/vomit    ______________________________________________________________________  ALL:   Allergies    No Known Allergies    Intolerances      ______________________________________________________________________  SH: Social History:    ______________________________________________________________________  FH:  FAMILY HISTORY:    ______________________________________________________________________  ROS:    CONSTITUTIONAL:  No weight loss, fever, chills, weakness or fatigue.    HEENT:  Eyes:  No visual loss, blurred vision, double vision or yellow sclerae. Ears, Nose, Throat:  No hearing loss, sneezing, congestion, runny nose or sore throat.    SKIN:  No rash or itching.    CARDIOVASCULAR:  No chest pain, chest pressure or chest discomfort. No palpitations or edema.    RESPIRATORY:  No shortness of breath, cough or sputum.    GASTROINTESTINAL:  SEE HPI    GENITOURINARY:  No dysuria, hematuria, urinary frequency    NEUROLOGICAL:  No headache, dizziness, syncope, paralysis, ataxia, numbness or tingling in the extremities. No change in bowel or bladder control.    MUSCULOSKELETAL:  No muscle, back pain, joint pain or stiffness.    HEMATOLOGIC:  No anemia, bleeding or bruising.    LYMPHATICS:  No enlarged nodes. No history of splenectomy.    PSYCHIATRIC:  No history of depression or anxiety.    ENDOCRINOLOGIC:  No reports of sweating, cold or heat intolerance. No polyuria or polydipsia.    ALLERGIES:  No history of asthma, hives, eczema or rhinitis.  ______________________________________________________________________  PHYSICAL EXAM:  T(C): 36.4 (05-31-23 @ 05:00), Max: 36.9 (05-30-23 @ 14:12)  HR: 92 (05-31-23 @ 05:00)  BP: 90/46 (05-31-23 @ 05:00)  RR: 18 (05-31-23 @ 05:00)  SpO2: 100% (05-31-23 @ 05:00)  Wt(kg): --    05-30 - 05-31  --------------------------------------------------------  IN:  Total IN: 0 mL    OUT:    Indwelling Catheter - Urethral (mL): 1000 mL  Total OUT: 1000 mL    Total NET: -1000 mL          GEN: NAD   CVS- S1 S2  ABD: soft nontender, non distended, bowel sounds+  LUNGS: clear to auscultation  NEURO:  AAO x 3  Extremities: no cyanosis, no calf tenderness, no edema, no clubbing      ______________________________________________________________________  LABS:                        8.3    7.19  )-----------( 224      ( 31 May 2023 08:33 )             27.5     05-31    139  |  116<H>  |  74<H>  ----------------------------<  99  4.0   |  16<L>  |  3.52<H>    Ca    10.2      31 May 2023 08:33  Phos  2.7     05-31  Mg     2.4     05-31    TPro  5.2<L>  /  Alb  1.7<L>  /  TBili  0.2  /  DBili  x   /  AST  4<L>  /  ALT  <6<L>  /  AlkPhos  88  05-31    LIVER FUNCTIONS - ( 31 May 2023 08:33 )  Alb: 1.7 g/dL / Pro: 5.2 g/dL / ALK PHOS: 88 U/L / ALT: <6 U/L DA / AST: 4 U/L / GGT: x           ______________________________________________________________________  IMAGING:    < from: CT Abdomen and Pelvis No Cont (05.28.23 @ 22:34) >  IMPRESSION:  1. No bowel obstruction or inflammation.  2. Circumferential wall thickening of the distal esophagus which could be   due to an esophagitis.  3. Circumferential urinary bladder wall thickening which could be due to   underdistention versus a cystitis. Correlate with urinalysis.    < end of copied text >

## 2023-05-31 NOTE — PROGRESS NOTE ADULT - ASSESSMENT
63M, from [place], with significant PMH anemia, CKD4 (base SCr 3.8), p/w abd pain a/w "brown” emesis and food intolerance x4d. Admitted for complicated UTI + suspected GIB.    GIB has resolved Imaging indicates esophagitis.      EGD revealed erythema, congestion and ulceration in the lower third of the esophagus compatible with ulcerative esophagitis.    Recommendation:  Pantoprazole 40mg bid for 2 weeks then QD   Carafate susp 10 cc qid for two weeks      This note and its recommendations herein are preliminary until such time as cosigned by an attending.    GI will sign off at this time.  Thank you for involving us in the care of Mr. Rhoades  Please re-consult GI PRN.    63M, from [place], with significant PMH anemia, CKD4 (base SCr 3.8), p/w abd pain a/w "brown” emesis and food intolerance x4d. Admitted for complicated UTI + suspected GIB.    GIB has resolved Imaging indicates esophagitis.      EGD revealed erythema, congestion and ulceration in the lower third of the esophagus compatible with ulcerative esophagitis.    Recommendation:  Advance diet as tolerated  Pantoprazole 40mg bid for 2 weeks then QD   Carafate susp 10 cc qid for two weeks      This note and its recommendations herein are preliminary until such time as cosigned by an attending.    GI will sign off at this time.  Thank you for involving us in the care of Mr. Rhoades  Please re-consult GI PRN.

## 2023-05-31 NOTE — PROGRESS NOTE ADULT - NS ATTEND AMEND GEN_ALL_CORE FT
64 y/o M, A&Ox3, walks with walker+assistance, chronic daley(since Dec 2022, failed TOV since), CKD4, HFpEF, BPH, PVD, Lymphedema, decubitus ulcers (Sacrum/gluteal) , Anemia, poly-neuropathy, poly-arthritis, hypothyroidism, seizures and Bipolar disorder was BIB EMS from [place] for abdominal pain, [place]. Admit to icu for hypotension. Patient endorses dark colored emesis     Assessment:  1. Sepsis  2. Vomiting  3. Catheter associated complicated UTI  4. SHEYLA on CKD stage 4   5. Seizure disorder  6. HFpEF  7. Pressure injury  8. Pseudomonas BSI    Plan:  - No further episodes of vomiting or bleeding reported  - Noted with esophageal thickening on CT scan  - EGD results noted  - Started on clear liquid diet  - cont. PPI   - Follow up repeat blood cultures for clearance  - Cont. Antibiotics   - ID consult appreciated  - PPI   - Cont. BPH medications  - Void trial  - DVT prophylaxis

## 2023-05-31 NOTE — PROGRESS NOTE ADULT - ASSESSMENT
1. SHEYLA due to hypovolemia state  -Scr remains stable at 3.8mg/dL and close to baseline   -FeNa <1%. CT scan shows no hydro but perinephric stranding.  -Adjust meds to eGFR and avoid IV Gadolinium contrast,NSAIDs, and phosphate enema.  -Monitor I/O's daily.   -Monitor SMA daily.  2. CKD stage 4 most likely due to ischemic nephropathy  -baseline scr around 3.7mg/dL in Dec after ATN with renal recovery.  -Keep patient euvolemic and renal diet  -Avoid Nephrotoxic Meds/ Agents such as (NSAIDs, IV contrast, Aminoglycosides such as gentamicin, -Gadolinium contrast, Phosphate containing enemas, etc..)  -Adjust Medications according to eGFR  3. Hypotension  -bp is stable   -s/p 3L on admission  -monitor BP.  4. Mineral Bone Disease:  -phos is okay without binders  -PTH is low and no need vitamin d analog.  5. Anemia of CKD  -Hematemesis being monitor but stable hb. s/p EGD on 5/30.  -iron panel/ferritin noted; pt may benefit from SUREKHA.    -F/u CBC daily  -transfuse if HB < 7.0.  6. Hypokalemia:  -supplement K to keep >4.0  -monitor K.   7. UTI with chronic daley's cath.  -on iv cefepime  -f/u urine culture  8. Low CO2  -obtain abg or vbg  -if ph is low then consider to add sodium bicarbonate 650mg tid.  -monitor CO2.     Discussed the assessment and plan with Primary Team/Nurse

## 2023-05-31 NOTE — DIETITIAN INITIAL EVALUATION ADULT - PERTINENT MEDS FT
MEDICATIONS  (STANDING):  albuterol    90 MICROgram(s) HFA Inhaler 2 Puff(s) Inhalation every 6 hours  buPROPion XL (24-Hour) . 150 milliGRAM(s) Oral daily  cefepime   IVPB 1000 milliGRAM(s) IV Intermittent every 12 hours  cefepime   IVPB      chlorhexidine 2% Cloths 1 Application(s) Topical <User Schedule>  divalproex  milliGRAM(s) Oral two times a day  dorzolamide 2% Ophthalmic Solution 1 Drop(s) Right EYE <User Schedule>  finasteride 5 milliGRAM(s) Oral daily  heparin   Injectable 5000 Unit(s) SubCutaneous every 8 hours  levothyroxine 125 MICROGram(s) Oral daily  mupirocin 2% Ointment 1 Application(s) Both Nostrils two times a day  pantoprazole    Tablet 40 milliGRAM(s) Oral two times a day  sucralfate suspension 1 Gram(s) Oral four times a day  tamsulosin 0.4 milliGRAM(s) Oral at bedtime  traZODone 50 milliGRAM(s) Oral at bedtime  vitamin A &amp; D Ointment 1 Application(s) Topical at bedtime    MEDICATIONS  (PRN):  metoclopramide Injectable 5 milliGRAM(s) IV Push every 6 hours PRN nausea/vomit

## 2023-05-31 NOTE — DISCHARGE NOTE PROVIDER - NSDCCPCAREPLAN_GEN_ALL_CORE_FT
PRINCIPAL DISCHARGE DIAGNOSIS  Diagnosis: Sepsis  Assessment and Plan of Treatment: You tested positive for bacteria in your blood and urine. You were treated with IV antibiotics while in the hospital........      SECONDARY DISCHARGE DIAGNOSES  Diagnosis: Esophagitis  Assessment and Plan of Treatment: You had an EGD which showed that you have ulcerative esophagitis. You were started on mdications to help heal your esophagitis.    Diagnosis: Acute kidney injury superimposed on CKD  Assessment and Plan of Treatment: Your infecttion made your kidney function worse. You were given fluids and medications to help improve your kidney function. Currently your creatinine is better than your baseline. You will need to follow with a renal doctor upon your discharge. Your kidney function nedds to be monitored closely.    Diagnosis: GIB (gastrointestinal bleeding)  Assessment and Plan of Treatment: Your EGD did not show any active bleeding. You were given medications to help stop any bleeding. You will need these medications for 2 weeks after your EGD. Report any rectal bleeding or vomiting of blood immediately to your doctor.    Diagnosis: Anemia  Assessment and Plan of Treatment: Your blood count was was low while in the hospital. Have your blood count monitored when you are discharged from the hospital.    Diagnosis: Seizure  Assessment and Plan of Treatment: Continue taking seizure medications valproic acid as prescribed. Report any new seizure activity to your doctor immediately.    Diagnosis: CKD (chronic kidney disease)  Assessment and Plan of Treatment: Continue to follow with a renal doctor after your discharge. You need to have your kidney function monitored when you get discharged from the hospital. You need to avoid taking medications that adversely effect your kidneys like motrin, alieve and any nonsteriodal medications.    Diagnosis: Hypothyroid  Assessment and Plan of Treatment: Continue taking your synthroid as prescribed. You need to have your thyroid function after discharge.    Diagnosis: BPH (benign prostatic hyperplasia)  Assessment and Plan of Treatment: Continue taking tamulosin and finasteride as prescribed. You had your daley changed on 5/29. Your daley was removed on 6/1.....    Diagnosis: Bipolar disorder  Assessment and Plan of Treatment: Continue taking wllbutrin as prescribed. Report any change moods to your doctor.

## 2023-05-31 NOTE — ADVANCED PRACTICE NURSE CONSULT - RECOMMEDATIONS
-Clean all wounds with normal saline and apply skin prep to the surrounding skin  -Apply a Foam dressing to the R. Gluteal wound Q 72hrs PRN  -Elevate/float the patients heels using heel protectors and reposition the patient Q 2hrs using wedges or pillows

## 2023-05-31 NOTE — CONSULT NOTE ADULT - SUBJECTIVE AND OBJECTIVE BOX
Time of visit:    CHIEF COMPLAINT: Patient is a 63y old  Male who presents with a chief complaint of sepsis (31 May 2023 17:57)      HPI:  62 y/o M, A&Ox3, bedbound, chronic daley catheter with CKD (baseline creat 3.8), COPD, CHFpEF, BPH, PVD, Lymphedema, decubitus ulcers (Sacrum/gluteal) , Anemia, poly-neuropathy, poly-arthritis, hypothyroidism, seizures and Bipolar disorder was BIB EMS from [place] for abdominal pain, [place]. Mr. Rhoades states that he developed acute onset sharp abdominal pain 4 days ago a/w "brown" emesis (witnessed by EMS). Unable to tolerate food/drink x 4 days. Having normal bowel movements, last this morning 5/28. Denies any fevers. NH paperwork states concern for obstruction.  Patient endorses BLOODY EMESIS x 4 days upon further questioning. (29 May 2023 01:36)   Patient seen and examined.     PAST MEDICAL & SURGICAL HISTORY:  Hypothyroid      Hyperlipidemia      Ambulatory dysfunction      Anemia      History of BPH      CKD (chronic kidney disease)      Chronic obstructive pulmonary disease (COPD)      Bipolar disorder      HLD (hyperlipidemia)      BPH (benign prostatic hyperplasia)      Chronic diastolic congestive heart failure      Lymphedema      Chronic leg pain      No significant past surgical history          Allergies    No Known Allergies    Intolerances        MEDICATIONS  (STANDING):  albuterol    90 MICROgram(s) HFA Inhaler 2 Puff(s) Inhalation every 6 hours  buPROPion XL (24-Hour) . 150 milliGRAM(s) Oral daily  cefepime   IVPB      cefepime   IVPB 1000 milliGRAM(s) IV Intermittent every 12 hours  chlorhexidine 2% Cloths 1 Application(s) Topical <User Schedule>  divalproex  milliGRAM(s) Oral two times a day  dorzolamide 2% Ophthalmic Solution 1 Drop(s) Right EYE <User Schedule>  finasteride 5 milliGRAM(s) Oral daily  heparin   Injectable 5000 Unit(s) SubCutaneous every 8 hours  levothyroxine 125 MICROGram(s) Oral daily  mupirocin 2% Ointment 1 Application(s) Both Nostrils two times a day  pantoprazole    Tablet 40 milliGRAM(s) Oral two times a day  sodium bicarbonate 650 milliGRAM(s) Oral three times a day  sucralfate suspension 1 Gram(s) Oral four times a day  tamsulosin 0.4 milliGRAM(s) Oral at bedtime  traZODone 50 milliGRAM(s) Oral at bedtime  vitamin A &amp; D Ointment 1 Application(s) Topical at bedtime      MEDICATIONS  (PRN):  metoclopramide Injectable 5 milliGRAM(s) IV Push every 6 hours PRN nausea/vomit   Medications up to date at time of exam.    Medications up to date at time of exam.    FAMILY HISTORY:      SOCIAL HISTORY  Smoking History: [   ] smoking/smoke exposure, [   ] former smoker  Living Condition: [   ] apartment, [   ] private house  Work History:   Travel History: denies recent travel  Illicit Substance Use: denies  Alcohol Use: denies    REVIEW OF SYSTEMS:    CONSTITUTIONAL:  denies fevers, chills, sweats, weight loss    HEENT:  denies diplopia or blurred vision, sore throat or runny nose.    CARDIOVASCULAR:  denies pressure, squeezing, tightness, or heaviness about the chest; no palpitations.    RESPIRATORY:  denies SOB, cough, PAULA, wheezing.    GASTROINTESTINAL:  denies abdominal pain, nausea, vomiting or diarrhea.    GENITOURINARY: denies dysuria, frequency or urgency.    NEUROLOGIC:  denies numbness, tingling, seizures or weakness.    PSYCHIATRIC:  denies disorder of thought or mood.    MSK: denies swelling, redness      PHYSICAL EXAMINATION:    GENERAL: The patient is a well-developed, well-nourished, in no apparent distress.     Vital Signs Last 24 Hrs  T(C): 36.5 (31 May 2023 12:27), Max: 36.5 (31 May 2023 12:27)  T(F): 97.7 (31 May 2023 12:27), Max: 97.7 (31 May 2023 12:27)  HR: 91 (31 May 2023 12:27) (87 - 92)  BP: 105/49 (31 May 2023 12:27) (90/46 - 105/49)  BP(mean): 62 (31 May 2023 12:27) (62 - 62)  RR: 20 (31 May 2023 12:27) (18 - 20)  SpO2: 100% (31 May 2023 12:27) (100% - 100%)    Parameters below as of 31 May 2023 12:27  Patient On (Oxygen Delivery Method): room air       (if applicable)    Chest Tube (if applicable)    HEENT: Head is normocephalic and atraumatic. Extraocular muscles are intact. Mucous membranes are moist.     NECK: Supple, no palpable adenopathy.    LUNGS: Clear to auscultation, no wheezing, rales, or rhonchi.    HEART: Regular rate and rhythm without murmur.    ABDOMEN: Soft, nontender, and nondistended.  No hepatosplenomegaly is noted.    RENAL: No difficulty voiding, no pelvic pain    EXTREMITIES: Without any cyanosis, clubbing, rash, lesions or edema.    NEUROLOGIC: Awake, alert, oriented, grossly intact    SKIN: Warm, dry, good turgor.      LABS:                        8.3    7.19  )-----------( 224      ( 31 May 2023 08:33 )             27.5     05-31    139  |  116<H>  |  74<H>  ----------------------------<  99  4.0   |  16<L>  |  3.52<H>    Ca    10.2      31 May 2023 08:33  Phos  2.7     05-31  Mg     2.4     05-31    TPro  5.2<L>  /  Alb  1.7<L>  /  TBili  0.2  /  DBili  x   /  AST  4<L>  /  ALT  <6<L>  /  AlkPhos  88  05-31        ABG - ( 31 May 2023 16:49 )  pH, Arterial: 7.28  pH, Blood: x     /  pCO2: 31    /  pO2: 93    / HCO3: 15    / Base Excess: -10.9 /  SaO2: 100                             MICROBIOLOGY: (if applicable)    RADIOLOGY & ADDITIONAL STUDIES:  EKG:   CXR:  ECHO:    IMPRESSION: 63y Male PAST MEDICAL & SURGICAL HISTORY:  Hypothyroid      Hyperlipidemia      Ambulatory dysfunction      Anemia      History of BPH      CKD (chronic kidney disease)      Chronic obstructive pulmonary disease (COPD)      Bipolar disorder      HLD (hyperlipidemia)      BPH (benign prostatic hyperplasia)      Chronic diastolic congestive heart failure      Lymphedema      Chronic leg pain      No significant past surgical history       p/w                   RECOMMENDATIONS:   Time of visit:    CHIEF COMPLAINT: Patient is a 63y old  Male who presents with a chief complaint of sepsis (31 May 2023 17:57)      HPI:  64 y/o M, A&Ox3, bedbound, chronic daley catheter with CKD (baseline creat 3.8), COPD, CHFpEF, BPH, PVD, Lymphedema, decubitus ulcers (Sacrum/gluteal) , Anemia, poly-neuropathy, poly-arthritis, hypothyroidism, seizures and Bipolar disorder was BIB EMS from [place] for abdominal pain, [place]. Mr. Rhoades states that he developed acute onset sharp abdominal pain 4 days ago a/w "brown" emesis (witnessed by EMS). Unable to tolerate food/drink x 4 days. Having normal bowel movements, last this morning 5/28. Denies any fevers. NH paperwork states concern for obstruction.  Patient endorses BLOODY EMESIS x 4 days upon further questioning. (29 May 2023 01:36)      PAST MEDICAL & SURGICAL HISTORY:  Hypothyroid      Hyperlipidemia      Ambulatory dysfunction      Anemia      History of BPH      CKD (chronic kidney disease)      Chronic obstructive pulmonary disease (COPD)      Bipolar disorder      HLD (hyperlipidemia)      BPH (benign prostatic hyperplasia)      Chronic diastolic congestive heart failure      Lymphedema      Chronic leg pain      No significant past surgical history          Allergies    No Known Allergies    Intolerances        MEDICATIONS  (STANDING):  albuterol    90 MICROgram(s) HFA Inhaler 2 Puff(s) Inhalation every 6 hours  buPROPion XL (24-Hour) . 150 milliGRAM(s) Oral daily  cefepime   IVPB      cefepime   IVPB 1000 milliGRAM(s) IV Intermittent every 12 hours  chlorhexidine 2% Cloths 1 Application(s) Topical <User Schedule>  divalproex  milliGRAM(s) Oral two times a day  dorzolamide 2% Ophthalmic Solution 1 Drop(s) Right EYE <User Schedule>  finasteride 5 milliGRAM(s) Oral daily  heparin   Injectable 5000 Unit(s) SubCutaneous every 8 hours  levothyroxine 125 MICROGram(s) Oral daily  mupirocin 2% Ointment 1 Application(s) Both Nostrils two times a day  pantoprazole    Tablet 40 milliGRAM(s) Oral two times a day  sodium bicarbonate 650 milliGRAM(s) Oral three times a day  sucralfate suspension 1 Gram(s) Oral four times a day  tamsulosin 0.4 milliGRAM(s) Oral at bedtime  traZODone 50 milliGRAM(s) Oral at bedtime  vitamin A &amp; D Ointment 1 Application(s) Topical at bedtime      MEDICATIONS  (PRN):  metoclopramide Injectable 5 milliGRAM(s) IV Push every 6 hours PRN nausea/vomit   Medications up to date at time of exam.    Medications up to date at time of exam.    FAMILY HISTORY:      SOCIAL HISTORY  Smoking History: [   ] smoking/smoke exposure, [ x  ] former smoker  Living Condition: [   ] apartment, [   ] private house  Work History:   Travel History: denies recent travel  Illicit Substance Use: denies  Alcohol Use: denies    REVIEW OF SYSTEMS: as per EMR     CONSTITUTIONAL:  denies fevers, chills, sweats, weight loss    HEENT:  denies diplopia or blurred vision, sore throat or runny nose.    CARDIOVASCULAR:  denies pressure, squeezing, tightness, or heaviness about the chest; no palpitations.    RESPIRATORY:  denies SOB, cough, PAULA, wheezing.    GASTROINTESTINAL:  denies abdominal pain, nausea, vomiting or diarrhea.    GENITOURINARY: denies dysuria, frequency or urgency.    NEUROLOGIC:  denies numbness, tingling, seizures or weakness.    PSYCHIATRIC:  denies disorder of thought or mood.    MSK: denies swelling, redness      PHYSICAL EXAMINATION:    GENERAL: The patient is a well-developed, well-nourished, in no apparent distress.     Vital Signs Last 24 Hrs  T(C): 36.5 (31 May 2023 12:27), Max: 36.5 (31 May 2023 12:27)  T(F): 97.7 (31 May 2023 12:27), Max: 97.7 (31 May 2023 12:27)  HR: 91 (31 May 2023 12:27) (87 - 92)  BP: 105/49 (31 May 2023 12:27) (90/46 - 105/49)  BP(mean): 62 (31 May 2023 12:27) (62 - 62)  RR: 20 (31 May 2023 12:27) (18 - 20)  SpO2: 100% (31 May 2023 12:27) (100% - 100%)    Parameters below as of 31 May 2023 12:27  Patient On (Oxygen Delivery Method): room air       (if applicable)    Chest Tube (if applicable)    HEENT: Head is normocephalic and atraumatic. Extraocular muscles are intact. Mucous membranes are moist.     NECK: Supple, no palpable adenopathy.    LUNGS: Clear to auscultation, no wheezing, rales, or rhonchi.    HEART: Regular rate and rhythm without murmur.    ABDOMEN: Soft, nontender, and nondistended.  No hepatosplenomegaly is noted.    RENAL: No difficulty voiding, no pelvic pain    EXTREMITIES: + chronic edema    NEUROLOGIC: Awake, alert, oriented, grossly intact    SKIN: Warm, dry, good turgor.      LABS:                        8.3    7.19  )-----------( 224      ( 31 May 2023 08:33 )             27.5     05-31    139  |  116<H>  |  74<H>  ----------------------------<  99  4.0   |  16<L>  |  3.52<H>    Ca    10.2      31 May 2023 08:33  Phos  2.7     05-31  Mg     2.4     05-31    TPro  5.2<L>  /  Alb  1.7<L>  /  TBili  0.2  /  DBili  x   /  AST  4<L>  /  ALT  <6<L>  /  AlkPhos  88  05-31        ABG - ( 31 May 2023 16:49 )  pH, Arterial: 7.28  pH, Blood: x     /  pCO2: 31    /  pO2: 93    / HCO3: 15    / Base Excess: -10.9 /  SaO2: 100                             MICROBIOLOGY: (if applicable)    RADIOLOGY & ADDITIONAL STUDIES:  EKG:   CXR:  ECHO:    IMPRESSION: 63y Male PAST MEDICAL & SURGICAL HISTORY:  Hypothyroid      Hyperlipidemia      Ambulatory dysfunction      Anemia      History of BPH      CKD (chronic kidney disease)      Chronic obstructive pulmonary disease (COPD)      Bipolar disorder      HLD (hyperlipidemia)      BPH (benign prostatic hyperplasia)      Chronic diastolic congestive heart failure      Lymphedema      Chronic leg pain      No significant past surgical history       p/w         IMP: This is a  63 year old Man , from [place] [address], ambulates with walker , chronic FC, h/o obesity, HTN, HLD, PVD, Seizures, CKD4 (base SCr 3.8), anemia, BPH, Lymphedema, hypothyroidism, HFpEF,  COPD, decubitus ulcer (sacrum/gluteal), polyneuropathy, polyarthritis, bipolar disorder. Patient presented to ED for abdominal pain, nausea and vomiting brown emesis. Patient admitted to ICU for Hypotension likely from Sepsis secondary to complicated UTI and esophagitis. CT head was negative for acute changes. CT abd showed circumferential wall thickening of distal esophagus. GI consulted recommending endoscopy for esophagitis.  Blood culture resulting Gram negative rods, on cefepime, Urine culture testing, likely will be colonized. Patient with pressure injury pending wound care consult.  COPD stable at present time     Sugg  - Continue iv antibx   - Repeat BC   - Albuterol inhaler q6h   - Wound care   - PPI and carafate as per GI

## 2023-05-31 NOTE — PROGRESS NOTE ADULT - PROBLEM SELECTOR PLAN 7
SHEYLA on CKD  stage 4. likely pre-renal  Creatinine improved to 3.52. Baseline around 3.7  CMP and phos levels daily  Avoid nephrotoxic agents.  Dr Valenzuela following.

## 2023-05-31 NOTE — DISCHARGE NOTE PROVIDER - DETAILS OF MALNUTRITION DIAGNOSIS/DIAGNOSES
This patient has been assessed with a concern for Malnutrition and was treated during this hospitalization for the following Nutrition diagnosis/diagnoses:     -  06/13/2023: Moderate protein-calorie malnutrition

## 2023-05-31 NOTE — DISCHARGE NOTE NURSING/CASE MANAGEMENT/SOCIAL WORK - NSDCPEFALRISK_GEN_ALL_CORE
For information on Fall & Injury Prevention, visit: [email or web address] OR  [email or web address] OR  https://www.cdc.gov/steadi/patient.html

## 2023-05-31 NOTE — DIETITIAN INITIAL EVALUATION ADULT - NSFNSPHYEXAMSKINFT_GEN_A_CORE
Pressure Injury 1: B/L Buttocks , Stage I, Blanchable area   Pressure Injury 2: R glute, Stage II  Pressure Injury 3: B/L heels, Stage I  Pressure Injury 4: Coccyx , Stage I  Pressure Injury 5: none, none  Pressure Injury 6: none, none  Pressure Injury 7: none, none  Pressure Injury 8: none, none  Pressure Injury 9: none, none  Pressure Injury 10: none, none  Pressure Injury 11: none, none, Pressure Injury 1: Bilateral:, buttocks, Stage I  Pressure Injury 2: Right:, buttocks, Stage II  Pressure Injury 3: Bilateral:, heel, Stage I  Pressure Injury 4: coccyx, Stage I  Pressure Injury 5: none, none  Pressure Injury 6: none, none  Pressure Injury 7: none, none  Pressure Injury 8: none, none  Pressure Injury 9: none, none  Pressure Injury 10: none, none  Pressure Injury 11: none, none

## 2023-05-31 NOTE — PROGRESS NOTE ADULT - PROBLEM SELECTOR PLAN 4
likely in the setting GIB and chronic disease.  Currently no signs of active bleeding.  Continue to monitor.  Transfuse for Hgb less than 7.0.

## 2023-05-31 NOTE — DISCHARGE NOTE PROVIDER - CARE PROVIDER_API CALL
Owen Phillips  Internal Medicine  [address], [address] [place] [address]  Phone: [phone]  Fax: [phone]  Follow Up Time:     Taj Valenzuela  Internal Medicine  [address] [place] [address]  Phone: [phone]  Fax: [phone]  Follow Up Time: 1 week

## 2023-05-31 NOTE — DIETITIAN INITIAL EVALUATION ADULT - OTHER INFO
Pt visited. Pt is on clear liquid diet. Pt reports C/O Nausea. D/W RN.  Pt is on PPI. Pt with Esophagitis.  Pt DG from ICU to 4 N on 5/29.  Per Pt Ht 68 inches,   lb. Wt 182 lb Per NSG Flow sheet. Pt with pressure ulcer STAGE 2 PRESSURE ULCER @ R GLUTAEUS, Stage 1 @ B/L Heels, coccyx.  Labs noted.

## 2023-05-31 NOTE — PROGRESS NOTE ADULT - SUBJECTIVE AND OBJECTIVE BOX
Patient is a 63y old  Male who presents with a chief complaint of sepsis (31 May 2023 14:49)    PATIENT IS SEEN AND EXAMINED IN MEDICAL FLOOR.  DORIST [    ]    ALYSIA [   ]      GT [   ]    ALLERGIES:  No Known Allergies      Daily     Daily     VITALS:    Vital Signs Last 24 Hrs  T(C): 36.5 (31 May 2023 12:27), Max: 36.6 (30 May 2023 15:54)  T(F): 97.7 (31 May 2023 12:27), Max: 97.9 (30 May 2023 15:54)  HR: 91 (31 May 2023 12:27) (87 - 92)  BP: 105/49 (31 May 2023 12:27) (90/46 - 105/49)  BP(mean): 62 (31 May 2023 12:27) (62 - 62)  RR: 20 (31 May 2023 12:27) (18 - 20)  SpO2: 100% (31 May 2023 12:27) (100% - 100%)    Parameters below as of 31 May 2023 12:27  Patient On (Oxygen Delivery Method): room air        LABS:    CBC Full  -  ( 31 May 2023 08:33 )  WBC Count : 7.19 K/uL  RBC Count : 2.73 M/uL  Hemoglobin : 8.3 g/dL  Hematocrit : 27.5 %  Platelet Count - Automated : 224 K/uL  Mean Cell Volume : 100.7 fl  Mean Cell Hemoglobin : 30.4 pg  Mean Cell Hemoglobin Concentration : 30.2 gm/dL  Auto Neutrophil # : x  Auto Lymphocyte # : x  Auto Monocyte # : x  Auto Eosinophil # : x  Auto Basophil # : x  Auto Neutrophil % : x  Auto Lymphocyte % : x  Auto Monocyte % : x  Auto Eosinophil % : x  Auto Basophil % : x      05-31    139  |  116<H>  |  74<H>  ----------------------------<  99  4.0   |  16<L>  |  3.52<H>    Ca    10.2      31 May 2023 08:33  Phos  2.7     05-31  Mg     2.4     05-31    TPro  5.2<L>  /  Alb  1.7<L>  /  TBili  0.2  /  DBili  x   /  AST  4<L>  /  ALT  <6<L>  /  AlkPhos  88  05-31    CAPILLARY BLOOD GLUCOSE            LIVER FUNCTIONS - ( 31 May 2023 08:33 )  Alb: 1.7 g/dL / Pro: 5.2 g/dL / ALK PHOS: 88 U/L / ALT: <6 U/L DA / AST: 4 U/L / GGT: x           Creatinine Trend: 3.52<--, 3.81<--, 4.12<--, 4.46<--, 4.98<--  I&O's Summary    30 May 2023 07:01  -  31 May 2023 07:00  --------------------------------------------------------  IN: 0 mL / OUT: 1000 mL / NET: -1000 mL            .Blood Blood-Peripheral  05-28 @ 21:09   No growth to date.  --  Blood Culture PCR      Clean Catch Clean Catch (Midstream)  05-28 @ 21:02   >=3 organisms. Probable collection contamination.  --  --          MEDICATIONS:    MEDICATIONS  (STANDING):  albuterol    90 MICROgram(s) HFA Inhaler 2 Puff(s) Inhalation every 6 hours  buPROPion XL (24-Hour) . 150 milliGRAM(s) Oral daily  cefepime   IVPB 1000 milliGRAM(s) IV Intermittent every 12 hours  cefepime   IVPB      chlorhexidine 2% Cloths 1 Application(s) Topical <User Schedule>  divalproex  milliGRAM(s) Oral two times a day  dorzolamide 2% Ophthalmic Solution 1 Drop(s) Right EYE <User Schedule>  finasteride 5 milliGRAM(s) Oral daily  heparin   Injectable 5000 Unit(s) SubCutaneous every 8 hours  levothyroxine 125 MICROGram(s) Oral daily  mupirocin 2% Ointment 1 Application(s) Both Nostrils two times a day  pantoprazole    Tablet 40 milliGRAM(s) Oral two times a day  sucralfate suspension 1 Gram(s) Oral four times a day  tamsulosin 0.4 milliGRAM(s) Oral at bedtime  traZODone 50 milliGRAM(s) Oral at bedtime  vitamin A &amp; D Ointment 1 Application(s) Topical at bedtime      MEDICATIONS  (PRN):  metoclopramide Injectable 5 milliGRAM(s) IV Push every 6 hours PRN nausea/vomit      REVIEW OF SYSTEMS:                           ALL ROS DONE [ X   ]    CONSTITUTIONAL:  LETHARGIC [   ], FEVER [   ], UNRESPONSIVE [   ]  CVS:  CP  [   ], SOB, [   ], PALPITATIONS [   ], DIZZYNESS [   ]  RS: COUGH [   ], SPUTUM [   ]  GI: ABDOMINAL PAIN [   ], NAUSEA [   ], VOMITINGS [   ], DIARRHEA [   ], CONSTIPATION [   ]  :  DYSURIA [   ], NOCTURIA [   ], INCREASED FREQUENCY [   ], DRIBLING [   ],  SKELETAL: PAINFUL JOINTS [   ], SWOLLEN JOINTS [   ], NECK ACHE [   ], LOW BACK ACHE [   ],  SKIN : ULCERS [   ], RASH [   ], ITCHING [   ]  CNS: HEAD ACHE [   ], DOUBLE VISION [   ], BLURRED VISION [   ], AMS / CONFUSION [   ], SEIZURES [   ], WEAKNESS [   ],TINGLING / NUMBNESS [   ]    PHYSICAL EXAMINATION:  GENERAL APPEARANCE: NO DISTRESS  HEENT:  NO PALLOR, NO  JVD,  NO   NODES, NECK SUPPLE  CVS: S1 +, S2 +,   RS: AEEB,  OCCASIONAL  RALES +,   NO RONCHI  ABD: SOFT, NT, NO, BS +  EXT: PE+  SKIN: WARM,   SKELETAL:  ROM ACCEPTABLE  CNS:  AAO X 2-3    RADIOLOGY :    RADIOLOGY AND READINGS REVIEWED    ASSESSMENT :       PLAN:  HPI:  62 y/o M, A&Ox3, bedbound, chronic daley catheter with CKD (baseline creat 3.8), COPD, CHFpEF, BPH, PVD, Lymphedema, decubitus ulcers (Sacrum/gluteal) , Anemia, poly-neuropathy, poly-arthritis, hypothyroidism, seizures and Bipolar disorder was BIB EMS from [place] for abdominal pain, [place]. Mr. Rhoades states that he developed acute onset sharp abdominal pain 4 days ago a/w "brown" emesis (witnessed by EMS). Unable to tolerate food/drink x 4 days. Having normal bowel movements, last this morning 5/28. Denies any fevers. NH paperwork states concern for obstruction.  Patient endorses BLOODY EMESIS x 4 days upon further questioning. (29 May 2023 01:36)    # SEPSIS S/T P.AERUGINOSA BACTEREMIA + COMPLICATED UTI  - ON CEFEPIME, F/U BCX AND UCX  - ID CONSULT  - CRITICAL CARE EVALUATION IN PROGRESS    # R/O GI BLEED  # ESOPHAGITIS  # ANEMIA OF CHRONIC DISEASE - MONITOR HGB, TRANSFUSION THRESHOLD HGB < 8  - S/P EGD - ULCERATIVE ESOPHAGITIS  - PPI BID  - CARAFATE QID  - ADVANCING DIET PER GI RECOMMENDATION   - GI CONSULT    # CHRONIC HYPOXIC RESPIRATORY FAILURE S/T UNDERLYING COPD  - ON PRN O2  - CRITICAL CARE EVALUATION IN PROGRESS    # SHEYLA ON CKD   # CHRONIC DALEY, BPH  - DALEY   - STRICT IS AND OS  - AVOID NEPHROTOXIC AGENTS  - MONITOR CR  - NEPHROLOGY CONSULT    # AMBULATORY DYSFUNCTION, IMPAIRED GAIT S/T OA, PVD, PERIPHERAL NEUROPATHY  - FALL PRECAUTIONS    # DYSPHAGIA - ON MODIFIED DIET PER SWALLOW EVAL    # HX OF SEIZURE DX  - ON VALPROIC ACID    # HYPOTHYROIDISM - ON LEVOTHYROXINE    # PRESSURE ULCERS  - PATIENT IS HIGH RISK FOR PRESSURE ULCERS DESPITE PREVENTIVE MEASURES IN PLACE  - WOUND CARE CONSULT    # GI PPX

## 2023-05-31 NOTE — PROGRESS NOTE ADULT - SUBJECTIVE AND OBJECTIVE BOX
NEPHROLOGY MEDICAL CARE, [facility] - Dr. Taj Valenzuela/ Dr. Hazel Solis/ Dr. Cosmo Root/ Dr. Fang Gifford    Date of Service: 05-31-23 @ 17:25    Patient was seen and examined at bedside.    CC: patient is okay and NAD    Vital Signs Last 24 Hrs  T(C): 36.5 (31 May 2023 12:27), Max: 36.5 (31 May 2023 12:27)  T(F): 97.7 (31 May 2023 12:27), Max: 97.7 (31 May 2023 12:27)  HR: 91 (31 May 2023 12:27) (87 - 92)  BP: 105/49 (31 May 2023 12:27) (90/46 - 105/49)  BP(mean): 62 (31 May 2023 12:27) (62 - 62)  RR: 20 (31 May 2023 12:27) (18 - 20)  SpO2: 100% (31 May 2023 12:27) (100% - 100%)    Parameters below as of 31 May 2023 12:27  Patient On (Oxygen Delivery Method): room air        05-30 @ 07:01  -  05-31 @ 07:00  --------------------------------------------------------  IN: 0 mL / OUT: 1000 mL / NET: -1000 mL        PHYSICAL EXAM:  General: No acute respiratory distress.  Eyes: conjunctiva and sclera clear  ENMT: Atraumatic, Normocephalic, supple, No JVD present.   Respiratory: Bilateral poor air entry; No rales, rhonchi, wheezing  Cardiovascular: S1S2+; no m/r/g  Gastrointestinal: Soft, Non-tender, Nondistended; Bowel sounds present  : daley's cath with clear urine.  Neuro:  Awake and oriented.  Ext: pedal edema, No Cyanosis  Skin: No visible rashes      MEDICATIONS:  MEDICATIONS  (STANDING):  albuterol    90 MICROgram(s) HFA Inhaler 2 Puff(s) Inhalation every 6 hours  buPROPion XL (24-Hour) . 150 milliGRAM(s) Oral daily  cefepime   IVPB      cefepime   IVPB 1000 milliGRAM(s) IV Intermittent every 12 hours  chlorhexidine 2% Cloths 1 Application(s) Topical <User Schedule>  divalproex  milliGRAM(s) Oral two times a day  dorzolamide 2% Ophthalmic Solution 1 Drop(s) Right EYE <User Schedule>  finasteride 5 milliGRAM(s) Oral daily  heparin   Injectable 5000 Unit(s) SubCutaneous every 8 hours  levothyroxine 125 MICROGram(s) Oral daily  mupirocin 2% Ointment 1 Application(s) Both Nostrils two times a day  pantoprazole    Tablet 40 milliGRAM(s) Oral two times a day  sucralfate suspension 1 Gram(s) Oral four times a day  tamsulosin 0.4 milliGRAM(s) Oral at bedtime  traZODone 50 milliGRAM(s) Oral at bedtime  vitamin A &amp; D Ointment 1 Application(s) Topical at bedtime    MEDICATIONS  (PRN):  metoclopramide Injectable 5 milliGRAM(s) IV Push every 6 hours PRN nausea/vomit          LABS:                        8.3    7.19  )-----------( 224      ( 31 May 2023 08:33 )             27.5     05-31    139  |  116<H>  |  74<H>  ----------------------------<  99  4.0   |  16<L>  |  3.52<H>    Ca    10.2      31 May 2023 08:33  Phos  2.7     05-31  Mg     2.4     05-31    TPro  5.2<L>  /  Alb  1.7<L>  /  TBili  0.2  /  DBili  x   /  AST  4<L>  /  ALT  <6<L>  /  AlkPhos  88  05-31        Magnesium, Serum: 2.4 mg/dL (05-31 @ 08:33)  Phosphorus Level, Serum: 2.7 mg/dL (05-31 @ 08:33)    Urine studies  Sodium, Random Urine: 37 mmol/L (05-29 @ 12:18)  Creatinine, Random Urine: 31 mg/dL (05-29 @ 12:18)  Urea Nitrogen,  Random Urine: 289 mg/dL (05-29 @ 12:18)    PTH and Vit D:

## 2023-05-31 NOTE — DISCHARGE NOTE PROVIDER - HOSPITAL COURSE
63 year old Male, from [place], AOx3, ambulates with walker , chronic FC, h/o obesity, HTN, HLD, PVD, Seizures, CKD4 (base SCr 3.8), anemia, BPH, Lymphedema, hypothyroidism, HFpEF, possible COPD, decubitus ulcer (sacrum/gluteal), polyneuropathy, polyarthritis, bipolar disorder. Patient presented to ED for abdominal pain, nausea and vomiting brown emesis. Patient admitted to ICU for Hypotension likely from Sepsis secondary to complicated UTI and esophagitis. CT head was negative for acute changes. CT abd showed circumferential wall thickening of distal esophagus. GI consulted recommending endoscopy for esophagitis.  Blood culture resulting Gram negative rods, on cefepime, Urine culture testing, likely will be colonized. Patient with pressure injury pending wound care consult. Nephrology consulted for SHEYLA on CKD. 63M, from [place], AOx3, bedbound, chronic FC, h/o obesity, HTN, HLD, PVD, Seizures, CKD (base SCr 3.8), anemia, BPH, Lymphedema, hypothyroidism, HFpEF, COPD, decubitus ulcer (sacrum/gluteal), polyneuropathy, polyarthritis, bipolar disorder, p/w abd pain a/w "brown” emesis and food intolerance x4d. Admitted for complicated UTI + suspected GIB.  Patient initially had BP of 88/62 but it improved with IVF. Patient admitted to poor oral intake for the past 4 days due to nausea and vomiting.  CT head was negative for acute changes. CT abd showed circumferential wall thickening of distal esophagus. PPI was started.  Patient complains of persistent nausea without vomiting. Reglan was started TID to improve GI motility. Clear liquid diet was started. Hemoglobin was stable around his baseline ~9. Pt had thrombocytopenia with concern for cefepime induced thrombobytopenia.  We are following daily d-dimers and dribrinogen levels with plan to transfuse 10units cryo if PLTs < 100 or if PLTs < 150 with bleeding. PLTs have been stable in 70-50's and Heparin subQ was started for DVT prophylaxis.    Patient had chronic daley from Dec 2022 and he failed TOV. UA was positive. Patient was started on Cefepime. Blood cultures and urine cultures were sent.  Wound care was consulted for sacral pressure ulcer.    Patient presented with BUN/Cr 103/4.98 (Baseline Cr 3.6~3.8). Patient received IVF and SCr has been slowly improving. Dr. Valenzuela nephrologist is on board.    Patient presented with Hypokalemia with K 3.1->2.8, likely due to vomiting and decreased PO intake. It was repleted IV and oral.    06/16: Thrombocytopenia improving. S/p Cryo. Hypernatremic with worsening SHEYLA on CKD. Likely pre-renal. Discussed with Nephrology. Albumin and light hydration. Anasarca. Extended dwell to RUE with redness and swelling around IV site. Afebrile.   S/p ABX.     06/17: Plt blue top 30 and drop in H&H. patient pale and lethargic. One unit of Platelet and 1 PRCBC ordered. Plan for NG tube insertion post platelet transfusion. Hypotensive and tachycardic overnight x 1 event. Improvement this AM. EKG noted. RUE venous doppler US negative for DVT. Hypernatremic with slight increase in SCr. Nephro following. Will start free water flushes once NG tube in. Dietitian consult for enteral feeding assessment and recommendations.     6/18: more alert and awake at baseline. s/p vomiting about 100ml with c/o nausea. s/p zofran. last BM 6/16, ordered dulcolax supp.  Hypernatremia improving.   feeding held.    63M, from [place], AOx3, bedbound, chronic FC, h/o obesity, HTN, HLD, PVD, Seizures, CKD (base SCr 3.8), anemia, BPH, Lymphedema, hypothyroidism, HFpEF, COPD, decubitus ulcer (sacrum/gluteal), polyneuropathy, polyarthritis, bipolar disorder, p/w abd pain a/w "brown” emesis and food intolerance x4d. Admitted for complicated UTI + suspected GIB.  Patient initially had BP of 88/62 but it improved with IVF. Patient admitted to poor oral intake for the past 4 days due to nausea and vomiting.  CT head was negative for acute changes. CT abd showed circumferential wall thickening of distal esophagus. PPI was started.  Patient complains of persistent nausea without vomiting. Reglan was started TID to improve GI motility. Clear liquid diet was started. Hemoglobin was stable around his baseline ~9. Pt had thrombocytopenia with concern for cefepime induced thrombocytopenia.  We are following daily d-dimers and dribrinogen levels with plan to transfuse 10units cryo if PLTs < 100 or if PLTs < 150 with bleeding. PLTs have been stable in 70-50's and Heparin subQ was started for DVT prophylaxis.    Patient had chronic daley from Dec 2022 and he failed TOV. UA was positive. Patient was started on Cefepime. Blood cultures and urine cultures were sent.  Wound care was consulted for sacral pressure ulcer.    Patient presented with BUN/Cr 103/4.98 (Baseline Cr 3.6~3.8). Patient received IVF and SCr has been slowly improving. Dr. Valenzuela nephrologist is on board.    Patient presented with Hypokalemia with K 3.1->2.8, likely due to vomiting and decreased PO intake. It was repleted IV and oral.    06/16: Thrombocytopenia improving. S/p Cryo. Hypernatremic with worsening SHEYLA on CKD. Likely pre-renal. Discussed with Nephrology. Albumin and light hydration. Anasarca. Extended dwell to RUE with redness and swelling around IV site. Afebrile.   S/p ABX.     06/17: Plt blue top 30 and drop in H&H. patient pale and lethargic. One unit of Platelet and 1 PRCBC ordered. Plan for NG tube insertion post platelet transfusion. Hypotensive and tachycardic overnight x 1 event. Improvement this AM. EKG noted. RUE venous doppler US negative for DVT. Hypernatremic with slight increase in SCr. Nephro following. Will start free water flushes once NG tube in. Dietitian consult for enteral feeding assessment and recommendations.     6/18: more alert and awake at baseline. s/p vomiting about 100ml with c/o nausea. s/p zofran. last BM 6/16, ordered dulcolax supp.  Hypernatremia improving.   feeding held.    63M, from [place], AOx3, bedbound, chronic FC, h/o obesity, HTN, HLD, PVD, Seizures, CKD (base SCr 3.8), anemia, BPH, Lymphedema, hypothyroidism, HFpEF, COPD, decubitus ulcer (sacrum/gluteal), polyneuropathy, polyarthritis, bipolar disorder, p/w abd pain a/w "brown” emesis and food intolerance x4d. Admitted for complicated UTI + suspected GIB.  Patient initially had BP of 88/62 but it improved with IVF. Patient admitted to poor oral intake for the past 4 days due to nausea and vomiting.  CT head was negative for acute changes. CT abd showed circumferential wall thickening of distal esophagus. PPI was started.  Patient complains of persistent nausea without vomiting. Reglan was started TID to improve GI motility. Clear liquid diet was started. Hemoglobin was stable around his baseline ~9. Pt had thrombocytopenia with concern for cefepime induced thrombocytopenia.  We are following daily d-dimers and dribrinogen levels with plan to transfuse 10units cryo if PLTs < 100 or if PLTs < 150 with bleeding. PLTs have been stable in 70-50's and Heparin subQ was started for DVT prophylaxis.    Patient had chronic daley from Dec 2022 and he failed TOV. UA was positive. Patient was started on Cefepime. Blood cultures and urine cultures were sent.  Wound care was consulted for sacral pressure ulcer.    Patient presented with BUN/Cr 103/4.98 (Baseline Cr 3.6~3.8). Patient received IVF and SCr has been slowly improving. Dr. Valenzuela nephrologist is on board.    Patient presented with Hypokalemia with K 3.1->2.8, likely due to vomiting and decreased PO intake. It was repleted IV and oral.    : Thrombocytopenia improving. S/p Cryo. Hypernatremic with worsening SHEYLA on CKD. Likely pre-renal. Discussed with Nephrology. Albumin and light hydration. Anasarca. Extended dwell to RUE with redness and swelling around IV site. Afebrile.   S/p ABX.     : Plt blue top 30 and drop in H&H. patient pale and lethargic. One unit of Platelet and 1 PRCBC ordered. Plan for NG tube insertion post platelet transfusion. Hypotensive and tachycardic overnight x 1 event. Improvement this AM. EKG noted. RUE venous doppler US negative for DVT. Hypernatremic with slight increase in SCr. Nephro following. Will start free water flushes once NG tube in. Dietitian consult for enteral feeding assessment and recommendations.       PLEASE REFER TO DISCHARGE FOR  PATIENT. PATIENT

## 2023-05-31 NOTE — DISCHARGE NOTE PROVIDER - NSDCMRMEDTOKEN_GEN_ALL_CORE_FT
acetaminophen 325 mg oral tablet: 2 tab(s) orally every 6 hours, As needed, Mild Pain (1 - 3), Moderate Pain (4 - 6)  buPROPion 150 mg/24 hours (XL) oral tablet, extended release: 1 tab(s) orally once a day  calcitriol 0.25 mcg oral capsule: 1 cap(s) orally once a day  Depakote 250 mg oral delayed release tablet: 2 orally 2 times a day  ferrous sulfate 325 mg (65 mg elemental iron) oral delayed release tablet: 1 orally once a day  folic acid 1 mg oral tablet: 1 tab(s) orally once a day  ipratropium-albuterol 0.5 mg-2.5 mg/3 mL inhalation solution: 3 milliliter(s) inhaled every 6 hours  Proscar 5 mg oral tablet: 1 tab(s) orally once a day  Senna 8.6 mg oral tablet: 1 orally once a day  sevelamer carbonate 800 mg oral tablet: 1 orally 3 times a day  Simbrinza 1%- 0.2% ophthalmic suspension: 1 drop(s) to each affected eye 2 times a day Right eye  Synthroid 125 mcg (0.125 mg) oral tablet: 1 tab(s) orally once a day  tamsulosin 0.4 mg oral capsule: 1 cap(s) orally once a day  traZODone 50 mg oral tablet: 1 orally once a day (at bedtime)  Vitamin D3 25 mcg (1000 intl units) oral tablet: 1 tab(s) orally once a day

## 2023-05-31 NOTE — DIETITIAN INITIAL EVALUATION ADULT - PERTINENT LABORATORY DATA
05-31    139  |  116<H>  |  74<H>  ----------------------------<  99  4.0   |  16<L>  |  3.52<H>    Ca    10.2      31 May 2023 08:33  Phos  2.7     05-31  Mg     2.4     05-31    TPro  5.2<L>  /  Alb  1.7<L>  /  TBili  0.2  /  DBili  x   /  AST  4<L>  /  ALT  <6<L>  /  AlkPhos  88  05-31  POCT Blood Glucose.: 83 mg/dL (05-30-23 @ 14:24)

## 2023-05-31 NOTE — PROGRESS NOTE ADULT - PROBLEM SELECTOR PLAN 1
Secondary to Bacteremia and complicated UTI.  Blood culture positive for Gram negative rods/Pseudomonas  F/U urine and repeated blood cultures.  Continue Cefepime.  ID Dr Nolasco

## 2023-05-31 NOTE — PROGRESS NOTE ADULT - ASSESSMENT
Patient is a 63 year old Male, from [place] [id number], ambulates with walker , chronic FC, h/o obesity, HTN, HLD, PVD, Seizures, CKD4 (base SCr 3.8), anemia, BPH, Lymphedema, hypothyroidism, HFpEF, possible COPD, decubitus ulcer (sacrum/gluteal), polyneuropathy, polyarthritis, bipolar disorder. Patient presented to ED for abdominal pain, nausea and vomiting brown emesis. Patient admitted to ICU for Hypotension likely from Sepsis secondary to complicated UTI and esophagitis. CT head was negative for acute changes. CT abd showed circumferential wall thickening of distal esophagus. GI consulted recommending endoscopy for esophagitis.  Blood culture resulting Gram negative rods, on cefepime, Urine culture testing, likely will be colonized. Patient with pressure injury pending wound care consult. Nephrology consulted for SHEYLA on CKD.

## 2023-05-31 NOTE — PROGRESS NOTE ADULT - SUBJECTIVE AND OBJECTIVE BOX
GAYLA PEARCE    SCU progress note    INTERVAL HPI/OVERNIGHT EVENTS: ***No overnight events    DNR [ ]   DNI  [  ]  FULL CODE    Covid - 19 PCR: negative 5/28    The 4Ms    What Matters Most: see GOC  Age appropriate Medications/Screen for High Risk Medication: Yes  Mentation: see CAM below  Mobility: defer to physical exam    The Confusion Assessment Method (CAM) Diagnostic Algorithm     1: Acute Onset or Fluctuating Course  - Is there evidence of an acute change in mental status from the patient’s baseline? Did the (abnormal) behavior  fluctuate during the day, that is, tend to come and go, or increase and decrease in severity?  [ ] YES [x ] NO     2: Inattention  - Did the patient have difficulty focusing attention, being easily distractible, or having difficulty keeping track of what was being said?  [ ] YES [x ] NO     3: Disorganized thinking  -Was the patient’s thinking disorganized or incoherent, such as rambling or irrelevant conversation, unclear or illogical flow of ideas, or unpredictable switching from subject to subject?  [ ] YES [x ] NO    4: Altered Level of consciousness?  [ ] YES [x ] NO    The diagnosis of delirium by CAM requires the presence of features 1 and 2 and either 3 or 4.    PRESSORS: [ ] YES [x ] NO  cefepime   IVPB 1000 milliGRAM(s) IV Intermittent every 12 hours  cefepime   IVPB        Cardiovascular:  Heart Failure  Acute   Acute on Chronic  Chronic         Pulmonary:  albuterol    90 MICROgram(s) HFA Inhaler 2 Puff(s) Inhalation every 6 hours    Hematalogic:  heparin   Injectable 5000 Unit(s) SubCutaneous every 8 hours    Other:  buPROPion XL (24-Hour) . 150 milliGRAM(s) Oral daily  chlorhexidine 2% Cloths 1 Application(s) Topical <User Schedule>  divalproex  milliGRAM(s) Oral two times a day  dorzolamide 2% Ophthalmic Solution 1 Drop(s) Right EYE <User Schedule>  finasteride 5 milliGRAM(s) Oral daily  levothyroxine 125 MICROGram(s) Oral daily  metoclopramide Injectable 5 milliGRAM(s) IV Push every 6 hours PRN  mupirocin 2% Ointment 1 Application(s) Both Nostrils two times a day  pantoprazole    Tablet 40 milliGRAM(s) Oral two times a day  sucralfate suspension 1 Gram(s) Oral four times a day  tamsulosin 0.4 milliGRAM(s) Oral at bedtime  traZODone 50 milliGRAM(s) Oral at bedtime  vitamin A &amp; D Ointment 1 Application(s) Topical at bedtime    albuterol    90 MICROgram(s) HFA Inhaler 2 Puff(s) Inhalation every 6 hours  buPROPion XL (24-Hour) . 150 milliGRAM(s) Oral daily  cefepime   IVPB      cefepime   IVPB 1000 milliGRAM(s) IV Intermittent every 12 hours  chlorhexidine 2% Cloths 1 Application(s) Topical <User Schedule>  divalproex  milliGRAM(s) Oral two times a day  dorzolamide 2% Ophthalmic Solution 1 Drop(s) Right EYE <User Schedule>  finasteride 5 milliGRAM(s) Oral daily  heparin   Injectable 5000 Unit(s) SubCutaneous every 8 hours  levothyroxine 125 MICROGram(s) Oral daily  metoclopramide Injectable 5 milliGRAM(s) IV Push every 6 hours PRN  mupirocin 2% Ointment 1 Application(s) Both Nostrils two times a day  pantoprazole    Tablet 40 milliGRAM(s) Oral two times a day  sucralfate suspension 1 Gram(s) Oral four times a day  tamsulosin 0.4 milliGRAM(s) Oral at bedtime  traZODone 50 milliGRAM(s) Oral at bedtime  vitamin A &amp; D Ointment 1 Application(s) Topical at bedtime    Drug Dosing Weight  Height (cm): 172.7 (28 May 2023 19:50)  Weight (kg): 83.9 (28 May 2023 19:50)  BMI (kg/m2): 28.1 (28 May 2023 19:50)  BSA (m2): 1.98 (28 May 2023 19:50)    CENTRAL LINE: [ ] YES [x ] NO  LOCATION:   DATE INSERTED:  REMOVE: [ ] YES [ ] NO  EXPLAIN:    HATFIELD: [ ] YES [ ] NO    DATE INSERTED:  REMOVE:  [ ] YES [ ] NO  EXPLAIN:    PAST MEDICAL & SURGICAL HISTORY:  Hypothyroid      Hyperlipidemia      Ambulatory dysfunction      Anemia      History of BPH      CKD (chronic kidney disease)      Chronic obstructive pulmonary disease (COPD)      Bipolar disorder      HLD (hyperlipidemia)      BPH (benign prostatic hyperplasia)      Chronic diastolic congestive heart failure      Lymphedema      Chronic leg pain      No significant past surgical history                  05-30 @ 07:01  -  05-31 @ 07:00  --------------------------------------------------------  IN: 0 mL / OUT: 1000 mL / NET: -1000 mL            PHYSICAL EXAM:    GENERAL: NAD, well-groomed, well-developed  HEAD:  Atraumatic, Normocephalic  EYES: EOMI, PERRLA, conjunctiva and sclera clear  ENMT: No tonsillar erythema, exudates  NECK: Supple, No JVD  NERVOUS SYSTEM:  Alert & Oriented X3, Follows commands. Moving all extremities  CHEST/LUNG: Diminished breath sounds with crackles  HEART: Regular rate and rhythm; No murmurs, rubs, or gallops  ABDOMEN: Soft, Nontender, Nondistended; Bowel sounds present  EXTREMITIES:  2+ Peripheral Pulses, No clubbing, cyanosis, or edema  LYMPH: No lymphadenopathy noted  SKIN: Chronic venous stasis bilateral lower extremities. Stage 1 Pressure Injury to the Bilateral Heels, Bilateral Gluteus, and Coccyx areas; as evident by non-blanchable erythema  Stage 2 Pressure Injury to the R. Gluteus (0.3cm x 0.3cm x 0.1cm) with pink tissue and scant drainage        LABS:  CBC Full  -  ( 31 May 2023 08:33 )  WBC Count : 7.19 K/uL  RBC Count : 2.73 M/uL  Hemoglobin : 8.3 g/dL  Hematocrit : 27.5 %  Platelet Count - Automated : 224 K/uL  Mean Cell Volume : 100.7 fl  Mean Cell Hemoglobin : 30.4 pg  Mean Cell Hemoglobin Concentration : 30.2 gm/dL  Auto Neutrophil # : x  Auto Lymphocyte # : x  Auto Monocyte # : x  Auto Eosinophil # : x  Auto Basophil # : x  Auto Neutrophil % : x  Auto Lymphocyte % : x  Auto Monocyte % : x  Auto Eosinophil % : x  Auto Basophil % : x    05-31    139  |  116<H>  |  74<H>  ----------------------------<  99  4.0   |  16<L>  |  3.52<H>    Ca    10.2      31 May 2023 08:33  Phos  2.7     05-31  Mg     2.4     05-31    TPro  5.2<L>  /  Alb  1.7<L>  /  TBili  0.2  /  DBili  x   /  AST  4<L>  /  ALT  <6<L>  /  AlkPhos  88  05-31              [  ]  DVT Prophylaxis  [  ]  Nutrition, Brand, Rate         Goal Rate        Abnormal Nutritional Status -  Malnutrition   Cachexia          RADIOLOGY & ADDITIONAL STUDIES:  ***  < from: Xray Chest 1 View- PORTABLE-Urgent (Xray Chest 1 View- PORTABLE-Urgent .) (05.28.23 @ 20:42) >  COMPARISON: 12/11/2022    The cardiomediastinal silhouette is normal and the anthony are not enlarged.   The trachea is midline. Suboptimal degree of inspiration with   bronchovascular crowding. Mild platelike atelectatic change right base.   There is no focal lung consolidation or sizable pleural effusion. No   significant osseous abnormality. Degenerative changes of the spine and AC   joints. Old right rib fracture deformities.    IMPRESSION:    Unremarkable frontal chest x ray    < end of copied text >  < from: CT Abdomen and Pelvis No Cont (05.28.23 @ 22:34) >  PROCEDURE:  CT of the Abdomen and Pelvis was performed.  Sagittal and coronal reformats were performed.    FINDINGS:  LOWER CHEST: Bibasilar subsegmental atelectasis. Circumferential wall   thickening of the distal esophagus.    LIVER: Within normal limits.  BILE DUCTS: Normal caliber.  GALLBLADDER: Cholecystectomy.  SPLEEN: Within normal limits.  PANCREAS: Within normal limits.  ADRENALS: Within normal limits.  KIDNEYS/URETERS: Redemonstrated moderate nonspecific bilateral   perinephric fat stranding. No hydronephrosis.    BLADDER: Collapsed around a Hatfield catheter balloon. Intravesicular gas   secondary to instrumentation. Circumferential wall thickening.  REPRODUCTIVE ORGANS: Prostate within normal limits.    BOWEL: No bowel obstruction or inflammation. Scattered colonic   diverticulosis without diverticulitis. Appendix is normal.  PERITONEUM: No ascites.  VESSELS: Infrarenal IVC filter.  RETROPERITONEUM/LYMPH NODES: No lymphadenopathy.  ABDOMINAL WALL: Small bilateral fat-containing inguinal hernias.  BONES: Old right rib fractures. Severe right hip degenerative change with   remodeling of the right femoral head. Degenerative changes of the spine.    IMPRESSION:  1. No bowel obstruction or inflammation.  2. Circumferential wall thickening of the distal esophagus which could be   due to an esophagitis.  3. Circumferential urinary bladder wall thickening which could be due to   underdistention versus a cystitis. Correlate with urinalysis.        --- End of Report ---      < end of copied text >  < from: CT Head No Cont (05.28.23 @ 22:33) >  COMPARISON STUDY: 12/4/2022    FINDINGS:    PARENCHYMA AND VENTRICLES: No acute intracranial hemorrhage, mass effect,   or midline shift. No hydrocephalus. Prominence of the sulci and   ventricles, consistent with age-related parenchymal volume loss; the   extent of ventricular and sulcal prominence appears somewhat increased   compared to 12/4/2022. Small vessel white matter changes.  EXTRA-AXIAL: No abnormal extraaxial collection.  PARANASAL SINUSES: Layering fluid in the right sphenoid sinus and   additional mild scattered mucosal thickening, as on prior.  TYMPANOMASTOID CAVITIES: Within normal limits.  ORBITS: Bilateral cataract surgery.  CALVARIUM: Within normal limits.  MISCELLANEOUS: Intracranial atherosclerosis.    IMPRESSION:  No acute intracranial hemorrhage, mass effect, or midline shift.  Paranasal sinus mucosal disease.  The extent of ventricular and sulcal prominence appears increased   compared with 12/4/2022, indeterminate clinical significance.    --- End of Report ---    < end of copied text >    Goals of Care Discussion with Family/Proxy/Other   - see note from 5/30

## 2023-06-01 NOTE — PROGRESS NOTE ADULT - PROBLEM SELECTOR PLAN 9
Continue wellbutrin and trazadone.  Maintain safety measures. Passed TOV this AM.   Will continue to monitor with bladder scan as patient has hx of not passing TOV.   Continue finasteride and tamsulosin.

## 2023-06-01 NOTE — PROGRESS NOTE ADULT - PROBLEM SELECTOR PLAN 7
SHEYLA on CKD  stage 4. likely pre-renal  Creatinine improved to 3.52. Baseline around 3.7  CMP and phos levels daily  Avoid nephrotoxic agents.  Dr Valenzuela following. Continue synthroid  TSH in AM

## 2023-06-01 NOTE — PROGRESS NOTE ADULT - PROBLEM SELECTOR PLAN 3
No active bleeding  Continue PPI and Carafate S/P EGD  +Ulcerative esophagitis  Continue PPI and Carafate /needs 2 weeks  Tolerating diet  F/u with GI outpatient

## 2023-06-01 NOTE — PROGRESS NOTE ADULT - PROBLEM SELECTOR PLAN 8
Will attempt TOV in am  Continue finasteride and tamsulosin SHEYLA on CKD  stage 4. likely pre-renal  BP goal normotensive. Avoid hypotension.   Creatinine improved to 3.52>>3.18;  Baseline around 3.7  CMP and phos levels daily  Avoid nephrotoxic agents.  Dr Valenzuela following.  Monitor UO  ABG 7.28/31/93/15/-10.9  C/w Sodium Bicarb 650 mg TID

## 2023-06-01 NOTE — PROGRESS NOTE ADULT - PROBLEM SELECTOR PLAN 2
S/P EGD  +Ulcerative esophigitis  Continue PPI and Carafate /needs 2 weeks  Tolerating diet K 3.4  Replacement in progress  CMP in AM

## 2023-06-01 NOTE — PROGRESS NOTE ADULT - SUBJECTIVE AND OBJECTIVE BOX
NEPHROLOGY MEDICAL CARE, [facility] - Dr. Taj Valenzuela/ Dr. Hazel Solis/ Dr. Cosmo Root/ Dr. Fang Gifford    Date of Service: 06-01-23 @ 16:04    Patient was seen and examined at bedside.    CC: patient is okay and NAD    Vital Signs Last 24 Hrs  T(C): 36.4 (01 Jun 2023 14:30), Max: 36.4 (31 May 2023 22:13)  T(F): 97.5 (01 Jun 2023 14:30), Max: 97.5 (31 May 2023 22:13)  HR: 94 (01 Jun 2023 14:30) (76 - 94)  BP: 110/59 (01 Jun 2023 14:30) (93/48 - 110/59)  BP(mean): 69 (31 May 2023 22:13) (69 - 69)  RR: 18 (01 Jun 2023 14:30) (18 - 19)  SpO2: 99% (01 Jun 2023 14:30) (99% - 100%)    Parameters below as of 01 Jun 2023 14:30  Patient On (Oxygen Delivery Method): room air        05-31 @ 07:01  -  06-01 @ 07:00  --------------------------------------------------------  IN: 0 mL / OUT: 850 mL / NET: -850 mL        PHYSICAL EXAM:  General: No acute respiratory distress.  Eyes: conjunctiva and sclera clear  ENMT: Atraumatic, Normocephalic, supple, No JVD present.   Respiratory: Bilateral poor air entry; No rales, rhonchi, wheezing  Cardiovascular: S1S2+; no m/r/g  Gastrointestinal: Soft, Non-tender, Nondistended; Bowel sounds present  Neuro:  Awake and oriented.  Ext: pedal edema, No Cyanosis  Skin: No visible rashes      MEDICATIONS:  MEDICATIONS  (STANDING):  albuterol    90 MICROgram(s) HFA Inhaler 2 Puff(s) Inhalation every 6 hours  buPROPion XL (24-Hour) . 150 milliGRAM(s) Oral daily  cefepime   IVPB      cefepime   IVPB 1000 milliGRAM(s) IV Intermittent every 12 hours  chlorhexidine 2% Cloths 1 Application(s) Topical <User Schedule>  divalproex  milliGRAM(s) Oral two times a day  dorzolamide 2% Ophthalmic Solution 1 Drop(s) Right EYE <User Schedule>  finasteride 5 milliGRAM(s) Oral daily  heparin   Injectable 5000 Unit(s) SubCutaneous every 8 hours  levothyroxine 125 MICROGram(s) Oral daily  mupirocin 2% Ointment 1 Application(s) Both Nostrils two times a day  pantoprazole    Tablet 40 milliGRAM(s) Oral two times a day  sodium bicarbonate 650 milliGRAM(s) Oral three times a day  sucralfate suspension 1 Gram(s) Oral four times a day  tamsulosin 0.4 milliGRAM(s) Oral at bedtime  traZODone 50 milliGRAM(s) Oral at bedtime  vitamin A &amp; D Ointment 1 Application(s) Topical at bedtime    MEDICATIONS  (PRN):  metoclopramide Injectable 5 milliGRAM(s) IV Push every 6 hours PRN nausea/vomit          LABS:                        8.2    7.11  )-----------( 228      ( 01 Jun 2023 06:23 )             26.6     06-01    140  |  116<H>  |  66<H>  ----------------------------<  89  3.4<L>   |  16<L>  |  3.18<H>    Ca    10.4      01 Jun 2023 06:23  Phos  2.2     06-01  Mg     2.4     06-01    TPro  5.3<L>  /  Alb  1.7<L>  /  TBili  0.2  /  DBili  x   /  AST  3<L>  /  ALT  <6<L>  /  AlkPhos  88  06-01        Phosphorus Level, Serum: 2.2 mg/dL (06-01 @ 06:23)  Magnesium, Serum: 2.4 mg/dL (06-01 @ 06:23)    Urine studies    PTH and Vit D:

## 2023-06-01 NOTE — PROGRESS NOTE ADULT - SUBJECTIVE AND OBJECTIVE BOX
GAYLA PEARCE    SCU progress note    INTERVAL HPI/OVERNIGHT EVENTS: ***    DNR [ ]   DNI  [  ]    Covid - 19 PCR:     The 4Ms    What Matters Most: see GOC  Age appropriate Medications/Screen for High Risk Medication: Yes  Mentation: see CAM below  Mobility: defer to physical exam    The Confusion Assessment Method (CAM) Diagnostic Algorithm     1: Acute Onset or Fluctuating Course  - Is there evidence of an acute change in mental status from the patient’s baseline? Did the (abnormal) behavior  fluctuate during the day, that is, tend to come and go, or increase and decrease in severity?  [ ] YES [ ] NO     2: Inattention  - Did the patient have difficulty focusing attention, being easily distractible, or having difficulty keeping track of what was being said?  [ ] YES [ ] NO     3: Disorganized thinking  -Was the patient’s thinking disorganized or incoherent, such as rambling or irrelevant conversation, unclear or illogical flow of ideas, or unpredictable switching from subject to subject?  [ ] YES [ ] NO    4: Altered Level of consciousness?  [ ] YES [ ] NO    The diagnosis of delirium by CAM requires the presence of features 1 and 2 and either 3 or 4.    PRESSORS: [ ] YES [ ] NO  cefepime   IVPB 1000 milliGRAM(s) IV Intermittent every 12 hours  cefepime   IVPB        Cardiovascular:  Heart Failure  Acute   Acute on Chronic  Chronic         Pulmonary:  albuterol    90 MICROgram(s) HFA Inhaler 2 Puff(s) Inhalation every 6 hours    Hematalogic:  heparin   Injectable 5000 Unit(s) SubCutaneous every 8 hours    Other:  buPROPion XL (24-Hour) . 150 milliGRAM(s) Oral daily  chlorhexidine 2% Cloths 1 Application(s) Topical <User Schedule>  divalproex  milliGRAM(s) Oral two times a day  dorzolamide 2% Ophthalmic Solution 1 Drop(s) Right EYE <User Schedule>  finasteride 5 milliGRAM(s) Oral daily  levothyroxine 125 MICROGram(s) Oral daily  metoclopramide Injectable 5 milliGRAM(s) IV Push every 6 hours PRN  mupirocin 2% Ointment 1 Application(s) Both Nostrils two times a day  pantoprazole    Tablet 40 milliGRAM(s) Oral two times a day  potassium phosphate / sodium phosphate Powder (PHOS-NaK) 1 Packet(s) Oral once  sodium bicarbonate 650 milliGRAM(s) Oral three times a day  sucralfate suspension 1 Gram(s) Oral four times a day  tamsulosin 0.4 milliGRAM(s) Oral at bedtime  traZODone 50 milliGRAM(s) Oral at bedtime  vitamin A &amp; D Ointment 1 Application(s) Topical at bedtime    albuterol    90 MICROgram(s) HFA Inhaler 2 Puff(s) Inhalation every 6 hours  buPROPion XL (24-Hour) . 150 milliGRAM(s) Oral daily  cefepime   IVPB 1000 milliGRAM(s) IV Intermittent every 12 hours  cefepime   IVPB      chlorhexidine 2% Cloths 1 Application(s) Topical <User Schedule>  divalproex  milliGRAM(s) Oral two times a day  dorzolamide 2% Ophthalmic Solution 1 Drop(s) Right EYE <User Schedule>  finasteride 5 milliGRAM(s) Oral daily  heparin   Injectable 5000 Unit(s) SubCutaneous every 8 hours  levothyroxine 125 MICROGram(s) Oral daily  metoclopramide Injectable 5 milliGRAM(s) IV Push every 6 hours PRN  mupirocin 2% Ointment 1 Application(s) Both Nostrils two times a day  pantoprazole    Tablet 40 milliGRAM(s) Oral two times a day  potassium phosphate / sodium phosphate Powder (PHOS-NaK) 1 Packet(s) Oral once  sodium bicarbonate 650 milliGRAM(s) Oral three times a day  sucralfate suspension 1 Gram(s) Oral four times a day  tamsulosin 0.4 milliGRAM(s) Oral at bedtime  traZODone 50 milliGRAM(s) Oral at bedtime  vitamin A &amp; D Ointment 1 Application(s) Topical at bedtime    Drug Dosing Weight  Height (cm): 172.7 (28 May 2023 19:50)  Weight (kg): 83.9 (28 May 2023 19:50)  BMI (kg/m2): 28.1 (28 May 2023 19:50)  BSA (m2): 1.98 (28 May 2023 19:50)    CENTRAL LINE: [ ] YES [ ] NO  LOCATION:   DATE INSERTED:  REMOVE: [ ] YES [ ] NO  EXPLAIN:    HATFIELD: [ ] YES [ ] NO    DATE INSERTED:  REMOVE:  [ ] YES [ ] NO  EXPLAIN:    PAST MEDICAL & SURGICAL HISTORY:  Hypothyroid      Hyperlipidemia      Ambulatory dysfunction      Anemia      History of BPH      CKD (chronic kidney disease)      Chronic obstructive pulmonary disease (COPD)      Bipolar disorder      HLD (hyperlipidemia)      BPH (benign prostatic hyperplasia)      Chronic diastolic congestive heart failure      Lymphedema      Chronic leg pain      No significant past surgical history            ABG - ( 31 May 2023 16:49 )  pH, Arterial: 7.28  pH, Blood: x     /  pCO2: 31    /  pO2: 93    / HCO3: 15    / Base Excess: -10.9 /  SaO2: 100                   05-31 @ 07:01  -  06-01 @ 07:00  --------------------------------------------------------  IN: 0 mL / OUT: 850 mL / NET: -850 mL            PHYSICAL EXAM:    GENERAL: NAD, well-groomed, well-developed  HEAD:  Atraumatic, Normocephalic  EYES: EOMI, PERRLA, conjunctiva and sclera clear  ENMT: No tonsillar erythema, exudates, or enlargement; Moist mucous membranes, Good dentition, No lesions  NECK: Supple, No JVD, Normal thyroid  NERVOUS SYSTEM:  Alert & Oriented X3, Good concentration; Motor Strength 5/5 B/L upper and lower extremities; DTRs 2+ intact and symmetric  CHEST/LUNG: Clear to percussion bilaterally; No rales, rhonchi, wheezing, or rubs  HEART: Regular rate and rhythm; No murmurs, rubs, or gallops  ABDOMEN: Soft, Nontender, Nondistended; Bowel sounds present  EXTREMITIES:  2+ Peripheral Pulses, No clubbing, cyanosis, or edema  LYMPH: No lymphadenopathy noted  SKIN: No rashes or lesions      LABS:  CBC Full  -  ( 01 Jun 2023 06:23 )  WBC Count : 7.11 K/uL  RBC Count : 2.64 M/uL  Hemoglobin : 8.2 g/dL  Hematocrit : 26.6 %  Platelet Count - Automated : 228 K/uL  Mean Cell Volume : 100.8 fl  Mean Cell Hemoglobin : 31.1 pg  Mean Cell Hemoglobin Concentration : 30.8 gm/dL  Auto Neutrophil # : x  Auto Lymphocyte # : x  Auto Monocyte # : x  Auto Eosinophil # : x  Auto Basophil # : x  Auto Neutrophil % : x  Auto Lymphocyte % : x  Auto Monocyte % : x  Auto Eosinophil % : x  Auto Basophil % : x    06-01    140  |  116<H>  |  66<H>  ----------------------------<  89  3.4<L>   |  16<L>  |  3.18<H>    Ca    10.4      01 Jun 2023 06:23  Phos  2.2     06-01  Mg     2.4     06-01    TPro  5.3<L>  /  Alb  1.7<L>  /  TBili  0.2  /  DBili  x   /  AST  3<L>  /  ALT  <6<L>  /  AlkPhos  88  06-01              [  ]  DVT Prophylaxis  [  ]  Nutrition, Brand, Rate         Goal Rate        Abnormal Nutritional Status -  Malnutrition   Cachexia      Morbid Obesity BMI >/=40    RADIOLOGY & ADDITIONAL STUDIES:  ***    Goals of Care Discussion with Family/Proxy/Other   - see note from/family meeting set up for...     GAYLA PEARCE    SCU progress note    INTERVAL HPI/OVERNIGHT EVENTS: No events overnight.     Full Code.     Covid - 19 PCR: 05/28 not detected    The 4Ms    What Matters Most: see GOC  Age appropriate Medications/Screen for High Risk Medication: Yes  Mentation: see CAM below  Mobility: defer to physical exam    The Confusion Assessment Method (CAM) Diagnostic Algorithm     1: Acute Onset or Fluctuating Course  - Is there evidence of an acute change in mental status from the patient’s baseline? Did the (abnormal) behavior  fluctuate during the day, that is, tend to come and go, or increase and decrease in severity?  [ ] YES [x ] NO     2: Inattention  - Did the patient have difficulty focusing attention, being easily distractible, or having difficulty keeping track of what was being said?  [ ] YES [x ] NO     3: Disorganized thinking  -Was the patient’s thinking disorganized or incoherent, such as rambling or irrelevant conversation, unclear or illogical flow of ideas, or unpredictable switching from subject to subject?  [ ] YES [x ] NO    4: Altered Level of consciousness?  [ ] YES [x ] NO    The diagnosis of delirium by CAM requires the presence of features 1 and 2 and either 3 or 4.    PRESSORS: [ ] YES [ x] NO  cefepime   IVPB 1000 milliGRAM(s) IV Intermittent every 12 hours  cefepime   IVPB        Cardiovascular:  Heart Failure  Acute   Acute on Chronic  Chronic         Pulmonary:  albuterol    90 MICROgram(s) HFA Inhaler 2 Puff(s) Inhalation every 6 hours    Hematalogic:  heparin   Injectable 5000 Unit(s) SubCutaneous every 8 hours    Other:  buPROPion XL (24-Hour) . 150 milliGRAM(s) Oral daily  chlorhexidine 2% Cloths 1 Application(s) Topical <User Schedule>  divalproex  milliGRAM(s) Oral two times a day  dorzolamide 2% Ophthalmic Solution 1 Drop(s) Right EYE <User Schedule>  finasteride 5 milliGRAM(s) Oral daily  levothyroxine 125 MICROGram(s) Oral daily  metoclopramide Injectable 5 milliGRAM(s) IV Push every 6 hours PRN  mupirocin 2% Ointment 1 Application(s) Both Nostrils two times a day  pantoprazole    Tablet 40 milliGRAM(s) Oral two times a day  potassium phosphate / sodium phosphate Powder (PHOS-NaK) 1 Packet(s) Oral once  sodium bicarbonate 650 milliGRAM(s) Oral three times a day  sucralfate suspension 1 Gram(s) Oral four times a day  tamsulosin 0.4 milliGRAM(s) Oral at bedtime  traZODone 50 milliGRAM(s) Oral at bedtime  vitamin A &amp; D Ointment 1 Application(s) Topical at bedtime    albuterol    90 MICROgram(s) HFA Inhaler 2 Puff(s) Inhalation every 6 hours  buPROPion XL (24-Hour) . 150 milliGRAM(s) Oral daily  cefepime   IVPB 1000 milliGRAM(s) IV Intermittent every 12 hours  cefepime   IVPB      chlorhexidine 2% Cloths 1 Application(s) Topical <User Schedule>  divalproex  milliGRAM(s) Oral two times a day  dorzolamide 2% Ophthalmic Solution 1 Drop(s) Right EYE <User Schedule>  finasteride 5 milliGRAM(s) Oral daily  heparin   Injectable 5000 Unit(s) SubCutaneous every 8 hours  levothyroxine 125 MICROGram(s) Oral daily  metoclopramide Injectable 5 milliGRAM(s) IV Push every 6 hours PRN  mupirocin 2% Ointment 1 Application(s) Both Nostrils two times a day  pantoprazole    Tablet 40 milliGRAM(s) Oral two times a day  potassium phosphate / sodium phosphate Powder (PHOS-NaK) 1 Packet(s) Oral once  sodium bicarbonate 650 milliGRAM(s) Oral three times a day  sucralfate suspension 1 Gram(s) Oral four times a day  tamsulosin 0.4 milliGRAM(s) Oral at bedtime  traZODone 50 milliGRAM(s) Oral at bedtime  vitamin A &amp; D Ointment 1 Application(s) Topical at bedtime    Drug Dosing Weight  Height (cm): 172.7 (28 May 2023 19:50)  Weight (kg): 83.9 (28 May 2023 19:50)  BMI (kg/m2): 28.1 (28 May 2023 19:50)  BSA (m2): 1.98 (28 May 2023 19:50)    CENTRAL LINE: [ ] YES [x ] NO  LOCATION:   DATE INSERTED:  REMOVE: [ ] YES [ ] NO  EXPLAIN:    HATFIELD: [ ] YES [x ] NO    DATE INSERTED:  REMOVE:  [ ] YES [ ] NO  EXPLAIN:    PAST MEDICAL & SURGICAL HISTORY:  Hypothyroid      Hyperlipidemia      Ambulatory dysfunction      Anemia      History of BPH      CKD (chronic kidney disease)      Chronic obstructive pulmonary disease (COPD)      Bipolar disorder      HLD (hyperlipidemia)      BPH (benign prostatic hyperplasia)      Chronic diastolic congestive heart failure      Lymphedema      Chronic leg pain      No significant past surgical history    ABG - ( 31 May 2023 16:49 )  pH, Arterial: 7.28  pH, Blood: x     /  pCO2: 31    /  pO2: 93    / HCO3: 15    / Base Excess: -10.9 /  SaO2: 100       05-31 @ 07:01  -  06-01 @ 07:00  --------------------------------------------------------  IN: 0 mL / OUT: 850 mL / NET: -850 mL    PHYSICAL EXAM:  GENERAL: NAD, well-groomed, well-developed  HEAD:  Atraumatic, Normocephalic  EYES: PERRLA, conjunctiva and sclera clear  ENMT:Moist mucous membranes  NECK: Supple  NERVOUS SYSTEM:  Alert & Oriented X3, Good concentration; Moves exts spontaneously b/l.   CHEST/LUNG: Clear to percussion bilaterally; No rales, rhonchi, wheezing, or rubs  HEART: Regular rate and rhythm; No murmurs, rubs, or gallops  ABDOMEN: Soft, Nontender, Nondistended; Bowel sounds present  EXTREMITIES:  2+ Peripheral Pulses, No clubbing, cyanosis, or edema  SKIN: Discoloration on lower exts below the knee b/l.   Stage 1 Pressure Injury to the Bilateral Heels, Bilateral Gluteus, and Coccyx areas; Stage 2 Pressure Injury to the R. Gluteus.       LABS:  CBC Full  -  ( 01 Jun 2023 06:23 )  WBC Count : 7.11 K/uL  RBC Count : 2.64 M/uL  Hemoglobin : 8.2 g/dL  Hematocrit : 26.6 %  Platelet Count - Automated : 228 K/uL  Mean Cell Volume : 100.8 fl  Mean Cell Hemoglobin : 31.1 pg  Mean Cell Hemoglobin Concentration : 30.8 gm/dL  Auto Neutrophil # : x  Auto Lymphocyte # : x  Auto Monocyte # : x  Auto Eosinophil # : x  Auto Basophil # : x  Auto Neutrophil % : x  Auto Lymphocyte % : x  Auto Monocyte % : x  Auto Eosinophil % : x  Auto Basophil % : x    06-01    140  |  116<H>  |  66<H>  ----------------------------<  89  3.4<L>   |  16<L>  |  3.18<H>    Ca    10.4      01 Jun 2023 06:23  Phos  2.2     06-01  Mg     2.4     06-01    TPro  5.3<L>  /  Alb  1.7<L>  /  TBili  0.2  /  DBili  x   /  AST  3<L>  /  ALT  <6<L>  /  AlkPhos  88  06-01    [x  ]  DVT Prophylaxis : Heparin Q 8hrs.   [ x ]  Nutrition: Clear liquids. Advance diet as tolerated.     RADIOLOGY & ADDITIONAL STUDIES:     < from: CT Abdomen and Pelvis No Cont (05.28.23 @ 22:34) >  ACC: [id number] EXAM:  CT ABDOMEN AND PELVIS   ORDERED BY: GERHARD REDDY     PROCEDURE DATE:  05/28/2023          INTERPRETATION:  CLINICAL INFORMATION: Abdominal pain and vomiting. Rule   out obstruction.    COMPARISON: CT of the abdomen and pelvis from 12/1/2022.    CONTRAST/COMPLICATIONS:  IV Contrast: NONE  Evaluation of the visceral organs is limited without   intravenous contrast  Oral Contrast: NONE  Complications: None reported at time of study completion    PROCEDURE:  CT of the Abdomen and Pelvis was performed.  Sagittal and coronal reformats were performed.    FINDINGS:  LOWER CHEST: Bibasilar subsegmental atelectasis. Circumferential wall   thickening of the distal esophagus.    LIVER: Within normal limits.  BILE DUCTS: Normal caliber.  GALLBLADDER: Cholecystectomy.  SPLEEN: Within normal limits.  PANCREAS: Within normal limits.  ADRENALS: Within normal limits.  KIDNEYS/URETERS: Redemonstrated moderate nonspecific bilateral   perinephric fat stranding. No hydronephrosis.    BLADDER: Collapsed around a [place]field catheter balloon. Intravesicular gas   secondary to instrumentation. Circumferential wall thickening.  REPRODUCTIVE ORGANS: Prostate within normal limits.    BOWEL: No bowel obstruction or inflammation. Scattered colonic   diverticulosis without diverticulitis. Appendix is normal.  PERITONEUM: No ascites.  VESSELS: Infrarenal IVC filter.  RETROPERITONEUM/LYMPH NODES: No lymphadenopathy.  ABDOMINAL WALL: Small bilateral fat-containing inguinal hernias.  BONES: Old right rib fractures. Severe right hip degenerative change with   remodeling of the right femoral head. Degenerative changes of the spine.    IMPRESSION:  1. No bowel obstruction or inflammation.  2. Circumferential wall thickening of the distal esophagus which could be   due to an esophagitis.  3. Circumferential urinary bladder wall thickening which could be due to   underdistention versus a cystitis. Correlate with urinalysis.        --- End of Report ---            GERHARD HAGAN MD; Attending Radiologist  This document has been electronically signed. May 28 2023 11:30PM    < end of copied text >  < from: CT Head No Cont (05.28.23 @ 22:33) >  ACC: [id number] EXAM:  CT BRAIN   ORDERED BY: GERHARD REDDY     PROCEDURE DATE:  05/28/2023        INTERPRETATION:  CLINICAL INDICATION: Altered mental status, vomiting.    TECHNIQUE:  Noncontrast CT of the head was performed.  Sagittal and coronal reformats were created.    COMPARISON STUDY: 12/4/2022    FINDINGS:    PARENCHYMA AND VENTRICLES: No acute intracranial hemorrhage, mass effect,   or midline shift. No hydrocephalus. Prominence of the sulci and   ventricles, consistent with age-related parenchymal volume loss; the   extent of ventricular and sulcal prominence appears somewhat increased   compared to 12/4/2022. Small vessel white matter changes.  EXTRA-AXIAL: No abnormal extraaxial collection.  PARANASAL SINUSES: Layering fluid in the right sphenoid sinus and   additional mild scattered mucosal thickening, as on prior.  TYMPANOMASTOID CAVITIES: Within normal limits.  ORBITS: Bilateral cataract surgery.  CALVARIUM: Within normal limits.  MISCELLANEOUS: Intracranial atherosclerosis.    IMPRESSION:  No acute intracranial hemorrhage, mass effect, or midline shift.  Paranasal sinus mucosal disease.  The extent of ventricular and sulcal prominence appears increased   compared with 12/4/2022, indeterminate clinical significance.    --- End of Report ---         UMM SIBLEY MD; Attending Radiologist  This document has been electronically signed. May 28 2023 11:22PM    < end of copied text >      Plan of care discussed with intensivist.

## 2023-06-01 NOTE — PROGRESS NOTE ADULT - PROBLEM SELECTOR PLAN 12
Plan to return to [place] pending repeat of blood culture results  PT reccs TG.  Advance diet as tolerated. GI f/u outpatient.  DVT ppx: Heparin SC  GI ppx: PPI  Patient to f/u outpatient with Nephro  and GI.

## 2023-06-01 NOTE — PROGRESS NOTE ADULT - PROBLEM SELECTOR PLAN 6
Continue synthroid Continue valproic acid 500 mg BID.   Valproic acid level less than 40.   Maintain seizure precautions.  No s/s of seizures.

## 2023-06-01 NOTE — PROGRESS NOTE ADULT - PROBLEM SELECTOR PLAN 5
Continue valproic acid  F/U valproic acid level  Maintain seizure precautions. likely multifactorial 2/2 GIB vs iron deficiency anemia vs chronic disease (CKD4).   Currently no signs of active bleeding.  Continue to monitor.  Transfuse for Hgb less than 7.0.

## 2023-06-01 NOTE — PROGRESS NOTE ADULT - ASSESSMENT
Patient is a 63 year old Male, from [place] [id number], ambulates with walker , chronic FC, h/o obesity, HTN, HLD, PVD, Seizures, CKD4 (base SCr 3.8), anemia, BPH, Lymphedema, hypothyroidism, HFpEF, possible COPD, decubitus ulcer (sacrum/gluteal), polyneuropathy, polyarthritis, bipolar disorder. Patient presented to ED for abdominal pain, nausea and vomiting brown emesis. Patient admitted to ICU for Hypotension likely from Sepsis secondary to complicated UTI and esophagitis. CT head was negative for acute changes. CT abd showed circumferential wall thickening of distal esophagus. GI consulted recommending endoscopy for esophagitis.  Blood culture resulting Gram negative rods, on cefepime, Urine culture testing, likely will be colonized. Patient with pressure injury pending wound care consult. Nephrology consulted for SHEYLA on CKD.     06/01:        Patient is a 63 year old Male, from [place], AOx3, ambulates with walker , chronic FC, h/o obesity, HTN, HLD, PVD, Seizures, CKD4 (base SCr 3.8), anemia, BPH, Lymphedema, hypothyroidism, HFpEF, possible COPD, decubitus ulcer (sacrum/gluteal), polyneuropathy, polyarthritis, bipolar disorder. Patient presented to ED for abdominal pain, nausea and vomiting brown emesis. Patient admitted to ICU for Hypotension likely from Sepsis secondary to complicated UTI and esophagitis. CT head was negative for acute changes. CT abd showed circumferential wall thickening of distal esophagus. GI consulted recommending endoscopy which took place on 5/30 with findings of ulcerative esophagitis. On PPI.   Blood cultures from 5/28 + for P. aeruginasa. Ucx likely contaminant. On cefepime.  Patient with pressure injury. Wound care consult. Nephrology following for SHEYLA on CKD.   06/11: Repeat blood cx pending. Advance diet as tolerated. PT reccs [facility] (5/30). Plan to return to [place] once repeat Bcx come back. C/w Cefepime for complicated UTI and P. Aeruginosa bacteriemia. Afebrile. BP soft this AM.

## 2023-06-01 NOTE — PROGRESS NOTE ADULT - SUBJECTIVE AND OBJECTIVE BOX
Time of Visit:  Patient seen and examined.     MEDICATIONS  (STANDING):  albuterol    90 MICROgram(s) HFA Inhaler 2 Puff(s) Inhalation every 6 hours  buPROPion XL (24-Hour) . 150 milliGRAM(s) Oral daily  cefepime   IVPB 1000 milliGRAM(s) IV Intermittent every 12 hours  cefepime   IVPB      chlorhexidine 2% Cloths 1 Application(s) Topical <User Schedule>  divalproex  milliGRAM(s) Oral two times a day  dorzolamide 2% Ophthalmic Solution 1 Drop(s) Right EYE <User Schedule>  finasteride 5 milliGRAM(s) Oral daily  heparin   Injectable 5000 Unit(s) SubCutaneous every 8 hours  levothyroxine 125 MICROGram(s) Oral daily  mupirocin 2% Ointment 1 Application(s) Both Nostrils two times a day  pantoprazole    Tablet 40 milliGRAM(s) Oral two times a day  sodium bicarbonate 650 milliGRAM(s) Oral three times a day  sucralfate suspension 1 Gram(s) Oral four times a day  tamsulosin 0.4 milliGRAM(s) Oral at bedtime  traZODone 50 milliGRAM(s) Oral at bedtime  vitamin A &amp; D Ointment 1 Application(s) Topical at bedtime      MEDICATIONS  (PRN):  metoclopramide Injectable 5 milliGRAM(s) IV Push every 6 hours PRN nausea/vomit       Medications up to date at time of exam.    ROS; No fever, chills, cough, congestion on exam.   PHYSICAL EXAMINATION:    Vital Signs Last 24 Hrs  T(C): 36.3 (01 Jun 2023 05:30), Max: 36.4 (31 May 2023 22:13)  T(F): 97.3 (01 Jun 2023 05:30), Max: 97.5 (31 May 2023 22:13)  HR: 89 (01 Jun 2023 09:50) (76 - 89)  BP: 106/49 (01 Jun 2023 09:50) (93/48 - 106/49)  BP(mean): 69 (31 May 2023 22:13) (69 - 69)  RR: 18 (01 Jun 2023 05:30) (18 - 19)  SpO2: 99% (01 Jun 2023 09:50) (99% - 100%)    Parameters below as of 01 Jun 2023 05:30  Patient On (Oxygen Delivery Method): room air       (if applicable)    General : Alert and oriented. Able to answer question with no SOB. No acute distress .      HEENT: Head is normocephalic and atraumatic. Extraocular muscles are intact. Mucous membranes are moist.     NECK: Supple, no palpable adenopathy.    LUNGS: Clear to auscultation bilaterally with no wheezing, rales, or rhonchi. No use of accessory muscle.     HEART: S1 S2 Regular rate and no click/ rub.     ABDOMEN: Soft, nontender, and nondistended.  No hepatosplenomegaly is noted.    ; No bladder distention and tenderness.     NEUROLOGIC: Awake, alert, oriented.     SKIN: Warm and moist . Non diaphoretic. Bilateral LE Chronic Venous Stasis.       LABS:                        8.2    7.11  )-----------( 228      ( 01 Jun 2023 06:23 )             26.6     06-01    140  |  116<H>  |  66<H>  ----------------------------<  89  3.4<L>   |  16<L>  |  3.18<H>    Ca    10.4      01 Jun 2023 06:23  Phos  2.2     06-01  Mg     2.4     06-01    TPro  5.3<L>  /  Alb  1.7<L>  /  TBili  0.2  /  DBili  x   /  AST  3<L>  /  ALT  <6<L>  /  AlkPhos  88  06-01        ABG - ( 31 May 2023 16:49 )  pH, Arterial: 7.28  pH, Blood: x     /  pCO2: 31    /  pO2: 93    / HCO3: 15    / Base Excess: -10.9 /  SaO2: 100         MICROBIOLOGY: (if applicable)    RADIOLOGY & ADDITIONAL STUDIES:  EKG:   CXR:  ECHO:    IMPRESSION: 63y Male PAST MEDICAL & SURGICAL HISTORY:  Hypothyroid      Hyperlipidemia      Ambulatory dysfunction      Anemia      History of BPH      CKD (chronic kidney disease)      Chronic obstructive pulmonary disease (COPD)      Bipolar disorder      HLD (hyperlipidemia)      BPH (benign prostatic hyperplasia)      Chronic diastolic congestive heart failure      Lymphedema      Chronic leg pain      No significant past surgical history    Impression: This is a 64 Y/O Male from [facility] . Presented to ED with abdominal pain, nausea and vomiting brown emesis. Patient admitted to ICU for Hypotension likely from Sepsis secondary to complicated UTI and esophagitis. CT head was negative for acute changes. CT abd showed circumferential wall thickening of distal esophagus. GI consulted recommending endoscopy for esophagitis. Blood culture resulting Gram negative rods, on cefepime, Urine culture testing, likely will be colonized. COPD stable at present time .    Suggestion:  O2 saturation 96% room air . So far saturating good room air.   Continue Albuterol 90 mcg 2 puffs Q 6 Hours.   On Cefepime 1 Gm IVPB Q 12 Hours .   On Lovenox 40 mg SQ daily.   On Protonix 40 mg twice Daily. Carafate 4x Daily.

## 2023-06-01 NOTE — PROGRESS NOTE ADULT - PROBLEM SELECTOR PLAN 1
Secondary to Bacteremia and complicated UTI.  Blood culture positive for Gram negative rods/Pseudomonas  F/U urine and repeated blood cultures.  Continue Cefepime.  ID Dr Nolasco Secondary to Bacteremia and complicated UTI.  Blood culture positive for Gram negative rods/Pseudomonas  Ucx likely contaminant.   F/u  repeated blood cultures  Continue Cefepime.  TTE noted.   Afebrile.   Soft BP this AM  ID Dr Nolasco

## 2023-06-01 NOTE — PROGRESS NOTE ADULT - PROBLEM SELECTOR PLAN 10
DVT and GI prophylaxis  F/U final culture results.  Continue Cefepime  PT rec TG  TOV in am  OOB daily. Continue Wellbutrin and Trazadone.  Maintain safety measures.

## 2023-06-01 NOTE — PROGRESS NOTE ADULT - NS ATTEND AMEND GEN_ALL_CORE FT
64 y/o M, A&Ox3, walks with walker+assistance, chronic daley(since Dec 2022, failed TOV since), CKD4, HFpEF, BPH, PVD, Lymphedema, decubitus ulcers (Sacrum/gluteal) , Anemia, poly-neuropathy, poly-arthritis, hypothyroidism, seizures and Bipolar disorder was BIB EMS from [place] for abdominal pain, [place]. Admit to icu for hypotension. Patient endorses dark colored emesis     Assessment:  1. Sepsis  2. Vomiting  3. Catheter associated complicated UTI  4. SHEYLA on CKD stage 4   5. Seizure disorder  6. HFpEF  7. Pressure injury  8. Pseudomonas BSI    Plan:  - No further episodes of vomiting or bleeding reported  - Noted with esophageal thickening on CT scan  - EGD results noted  - Advance diet   - cont. PPI   - Follow up repeat blood cultures for clearance  - Cont. Antibiotics   - ID consult appreciated  - PPI   - Cont. BPH medications  - Void trial  - DVT prophylaxis  - PT evaluation

## 2023-06-01 NOTE — PROGRESS NOTE ADULT - SUBJECTIVE AND OBJECTIVE BOX
Patient is a 63y old  Male who presents with a chief complaint of sepsis (01 Jun 2023 16:03)    PATIENT IS SEEN AND EXAMINED IN MEDICAL FLOOR.  DORIST [    ]    ALYSIA [   ]      GT [   ]    ALLERGIES:  No Known Allergies      Daily     Daily     VITALS:    Vital Signs Last 24 Hrs  T(C): 36.4 (01 Jun 2023 14:30), Max: 36.4 (31 May 2023 22:13)  T(F): 97.5 (01 Jun 2023 14:30), Max: 97.5 (31 May 2023 22:13)  HR: 94 (01 Jun 2023 14:30) (76 - 94)  BP: 110/59 (01 Jun 2023 14:30) (93/48 - 110/59)  BP(mean): 69 (31 May 2023 22:13) (69 - 69)  RR: 18 (01 Jun 2023 14:30) (18 - 19)  SpO2: 99% (01 Jun 2023 14:30) (99% - 100%)    Parameters below as of 01 Jun 2023 14:30  Patient On (Oxygen Delivery Method): room air        LABS:    CBC Full  -  ( 01 Jun 2023 06:23 )  WBC Count : 7.11 K/uL  RBC Count : 2.64 M/uL  Hemoglobin : 8.2 g/dL  Hematocrit : 26.6 %  Platelet Count - Automated : 228 K/uL  Mean Cell Volume : 100.8 fl  Mean Cell Hemoglobin : 31.1 pg  Mean Cell Hemoglobin Concentration : 30.8 gm/dL  Auto Neutrophil # : x  Auto Lymphocyte # : x  Auto Monocyte # : x  Auto Eosinophil # : x  Auto Basophil # : x  Auto Neutrophil % : x  Auto Lymphocyte % : x  Auto Monocyte % : x  Auto Eosinophil % : x  Auto Basophil % : x      06-01    140  |  116<H>  |  66<H>  ----------------------------<  89  3.4<L>   |  16<L>  |  3.18<H>    Ca    10.4      01 Jun 2023 06:23  Phos  2.2     06-01  Mg     2.4     06-01    TPro  5.3<L>  /  Alb  1.7<L>  /  TBili  0.2  /  DBili  x   /  AST  3<L>  /  ALT  <6<L>  /  AlkPhos  88  06-01    CAPILLARY BLOOD GLUCOSE            LIVER FUNCTIONS - ( 01 Jun 2023 06:23 )  Alb: 1.7 g/dL / Pro: 5.3 g/dL / ALK PHOS: 88 U/L / ALT: <6 U/L DA / AST: 3 U/L / GGT: x           Creatinine Trend: 3.18<--, 3.52<--, 3.81<--, 4.12<--, 4.46<--, 4.98<--  I&O's Summary    31 May 2023 07:01  -  01 Jun 2023 07:00  --------------------------------------------------------  IN: 0 mL / OUT: 850 mL / NET: -850 mL        ABG - ( 31 May 2023 16:49 )  pH, Arterial: 7.28  pH, Blood: x     /  pCO2: 31    /  pO2: 93    / HCO3: 15    / Base Excess: -10.9 /  SaO2: 100                 .Blood Blood-Peripheral  05-28 @ 21:09   No growth to date.  --  Blood Culture PCR  Pseudomonas aeruginosa      Clean Catch Clean Catch (Midstream)  05-28 @ 21:02   >=3 organisms. Probable collection contamination.  --  --          MEDICATIONS:    MEDICATIONS  (STANDING):  albuterol    90 MICROgram(s) HFA Inhaler 2 Puff(s) Inhalation every 6 hours  buPROPion XL (24-Hour) . 150 milliGRAM(s) Oral daily  cefepime   IVPB      cefepime   IVPB 1000 milliGRAM(s) IV Intermittent every 12 hours  chlorhexidine 2% Cloths 1 Application(s) Topical <User Schedule>  divalproex  milliGRAM(s) Oral two times a day  dorzolamide 2% Ophthalmic Solution 1 Drop(s) Right EYE <User Schedule>  finasteride 5 milliGRAM(s) Oral daily  heparin   Injectable 5000 Unit(s) SubCutaneous every 8 hours  levothyroxine 125 MICROGram(s) Oral daily  mupirocin 2% Ointment 1 Application(s) Both Nostrils two times a day  pantoprazole    Tablet 40 milliGRAM(s) Oral two times a day  sodium bicarbonate 650 milliGRAM(s) Oral three times a day  sucralfate suspension 1 Gram(s) Oral four times a day  tamsulosin 0.4 milliGRAM(s) Oral at bedtime  traZODone 50 milliGRAM(s) Oral at bedtime  vitamin A &amp; D Ointment 1 Application(s) Topical at bedtime      MEDICATIONS  (PRN):  metoclopramide Injectable 5 milliGRAM(s) IV Push every 6 hours PRN nausea/vomit      REVIEW OF SYSTEMS:                           ALL ROS DONE [ X   ]    CONSTITUTIONAL:  LETHARGIC [   ], FEVER [   ], UNRESPONSIVE [   ]  CVS:  CP  [   ], SOB, [   ], PALPITATIONS [   ], DIZZYNESS [   ]  RS: COUGH [   ], SPUTUM [   ]  GI: ABDOMINAL PAIN [   ], NAUSEA [   ], VOMITINGS [   ], DIARRHEA [   ], CONSTIPATION [   ]  :  DYSURIA [   ], NOCTURIA [   ], INCREASED FREQUENCY [   ], DRIBLING [   ],  SKELETAL: PAINFUL JOINTS [   ], SWOLLEN JOINTS [   ], NECK ACHE [   ], LOW BACK ACHE [   ],  SKIN : ULCERS [   ], RASH [   ], ITCHING [   ]  CNS: HEAD ACHE [   ], DOUBLE VISION [   ], BLURRED VISION [   ], AMS / CONFUSION [   ], SEIZURES [   ], WEAKNESS [   ],TINGLING / NUMBNESS [   ]    PHYSICAL EXAMINATION:  GENERAL APPEARANCE: NO DISTRESS  HEENT:  NO PALLOR, NO  JVD,  NO   NODES, NECK SUPPLE  CVS: S1 +, S2 +,   RS: AEEB,  OCCASIONAL  RALES +,   NO RONCHI  ABD: SOFT, NT, NO, BS +  EXT: PE+  SKIN: WARM,   SKELETAL:  ROM ACCEPTABLE  CNS:  AAO X 2-3    RADIOLOGY :    RADIOLOGY AND READINGS REVIEWED    ASSESSMENT :       PLAN:  HPI:  62 y/o M, A&Ox3, bedbound, chronic daley catheter with CKD (baseline creat 3.8), COPD, CHFpEF, BPH, PVD, Lymphedema, decubitus ulcers (Sacrum/gluteal) , Anemia, poly-neuropathy, poly-arthritis, hypothyroidism, seizures and Bipolar disorder was BIB EMS from [place] for abdominal pain, [place]. Mr. Rhoades states that he developed acute onset sharp abdominal pain 4 days ago a/w "brown" emesis (witnessed by EMS). Unable to tolerate food/drink x 4 days. Having normal bowel movements, last this morning 5/28. Denies any fevers. NH paperwork states concern for obstruction.  Patient endorses BLOODY EMESIS x 4 days upon further questioning. (29 May 2023 01:36)    # SEPSIS S/T P.AERUGINOSA BACTEREMIA + COMPLICATED UTI  - ON CEFEPIME, F/U BCX AND UCX  - ID CONSULT  - CRITICAL CARE EVALUATION IN PROGRESS    # R/O GI BLEED  # ESOPHAGITIS  # ANEMIA OF CHRONIC DISEASE - MONITOR HGB, TRANSFUSION THRESHOLD HGB < 8  - S/P EGD - ULCERATIVE ESOPHAGITIS  - PPI BID  - CARAFATE QID  - ADVANCING DIET PER GI RECOMMENDATION   - GI CONSULT    # CHRONIC HYPOXIC RESPIRATORY FAILURE S/T UNDERLYING COPD  - ON PRN O2  - CRITICAL CARE EVALUATION IN PROGRESS    # SHEYLA ON CKD   # CHRONIC DALEY, BPH  - DALEY   - STRICT IS AND OS  - AVOID NEPHROTOXIC AGENTS  - MONITOR CR  - NEPHROLOGY CONSULT    # AMBULATORY DYSFUNCTION, IMPAIRED GAIT S/T OA, PVD, PERIPHERAL NEUROPATHY  - FALL PRECAUTIONS    # DYSPHAGIA - ON MODIFIED DIET PER SWALLOW EVAL    # HX OF SEIZURE DX  - ON VALPROIC ACID    # HYPOTHYROIDISM - ON LEVOTHYROXINE    # PRESSURE ULCERS  - PATIENT IS HIGH RISK FOR PRESSURE ULCERS DESPITE PREVENTIVE MEASURES IN PLACE  - WOUND CARE CONSULT    # GI PPX   Patient is a 63y old  Male who presents with a chief complaint of sepsis (01 Jun 2023 16:03)    PATIENT IS SEEN AND EXAMINED IN SCU.    ALLERGIES:  No Known Allergies    VITALS:    Vital Signs Last 24 Hrs  T(C): 36.4 (01 Jun 2023 14:30), Max: 36.4 (31 May 2023 22:13)  T(F): 97.5 (01 Jun 2023 14:30), Max: 97.5 (31 May 2023 22:13)  HR: 94 (01 Jun 2023 14:30) (76 - 94)  BP: 110/59 (01 Jun 2023 14:30) (93/48 - 110/59)  BP(mean): 69 (31 May 2023 22:13) (69 - 69)  RR: 18 (01 Jun 2023 14:30) (18 - 19)  SpO2: 99% (01 Jun 2023 14:30) (99% - 100%)    Parameters below as of 01 Jun 2023 14:30  Patient On (Oxygen Delivery Method): room air        LABS:    CBC Full  -  ( 01 Jun 2023 06:23 )  WBC Count : 7.11 K/uL  RBC Count : 2.64 M/uL  Hemoglobin : 8.2 g/dL  Hematocrit : 26.6 %  Platelet Count - Automated : 228 K/uL  Mean Cell Volume : 100.8 fl  Mean Cell Hemoglobin : 31.1 pg  Mean Cell Hemoglobin Concentration : 30.8 gm/dL  Auto Neutrophil # : x  Auto Lymphocyte # : x  Auto Monocyte # : x  Auto Eosinophil # : x  Auto Basophil # : x  Auto Neutrophil % : x  Auto Lymphocyte % : x  Auto Monocyte % : x  Auto Eosinophil % : x  Auto Basophil % : x      06-01    140  |  116<H>  |  66<H>  ----------------------------<  89  3.4<L>   |  16<L>  |  3.18<H>    Ca    10.4      01 Jun 2023 06:23  Phos  2.2     06-01  Mg     2.4     06-01    TPro  5.3<L>  /  Alb  1.7<L>  /  TBili  0.2  /  DBili  x   /  AST  3<L>  /  ALT  <6<L>  /  AlkPhos  88  06-01    CAPILLARY BLOOD GLUCOSE            LIVER FUNCTIONS - ( 01 Jun 2023 06:23 )  Alb: 1.7 g/dL / Pro: 5.3 g/dL / ALK PHOS: 88 U/L / ALT: <6 U/L DA / AST: 3 U/L / GGT: x           Creatinine Trend: 3.18<--, 3.52<--, 3.81<--, 4.12<--, 4.46<--, 4.98<--  I&O's Summary    31 May 2023 07:01  -  01 Jun 2023 07:00  --------------------------------------------------------  IN: 0 mL / OUT: 850 mL / NET: -850 mL        ABG - ( 31 May 2023 16:49 )  pH, Arterial: 7.28  pH, Blood: x     /  pCO2: 31    /  pO2: 93    / HCO3: 15    / Base Excess: -10.9 /  SaO2: 100                 .Blood Blood-Peripheral  05-28 @ 21:09   No growth to date.  --  Blood Culture PCR  Pseudomonas aeruginosa      Clean Catch Clean Catch (Midstream)  05-28 @ 21:02   >=3 organisms. Probable collection contamination.  --  --          MEDICATIONS:    MEDICATIONS  (STANDING):  albuterol    90 MICROgram(s) HFA Inhaler 2 Puff(s) Inhalation every 6 hours  buPROPion XL (24-Hour) . 150 milliGRAM(s) Oral daily  cefepime   IVPB      cefepime   IVPB 1000 milliGRAM(s) IV Intermittent every 12 hours  chlorhexidine 2% Cloths 1 Application(s) Topical <User Schedule>  divalproex  milliGRAM(s) Oral two times a day  dorzolamide 2% Ophthalmic Solution 1 Drop(s) Right EYE <User Schedule>  finasteride 5 milliGRAM(s) Oral daily  heparin   Injectable 5000 Unit(s) SubCutaneous every 8 hours  levothyroxine 125 MICROGram(s) Oral daily  mupirocin 2% Ointment 1 Application(s) Both Nostrils two times a day  pantoprazole    Tablet 40 milliGRAM(s) Oral two times a day  sodium bicarbonate 650 milliGRAM(s) Oral three times a day  sucralfate suspension 1 Gram(s) Oral four times a day  tamsulosin 0.4 milliGRAM(s) Oral at bedtime  traZODone 50 milliGRAM(s) Oral at bedtime  vitamin A &amp; D Ointment 1 Application(s) Topical at bedtime      MEDICATIONS  (PRN):  metoclopramide Injectable 5 milliGRAM(s) IV Push every 6 hours PRN nausea/vomit      REVIEW OF SYSTEMS:                           ALL ROS DONE [ X   ]    CONSTITUTIONAL:  LETHARGIC [   ], FEVER [   ], UNRESPONSIVE [   ]  CVS:  CP  [   ], SOB, [   ], PALPITATIONS [   ], DIZZYNESS [   ]  RS: COUGH [   ], SPUTUM [   ]  GI: ABDOMINAL PAIN [   ], NAUSEA [   ], VOMITINGS [   ], DIARRHEA [   ], CONSTIPATION [   ]  :  DYSURIA [   ], NOCTURIA [   ], INCREASED FREQUENCY [   ], DRIBLING [   ],  SKELETAL: PAINFUL JOINTS [   ], SWOLLEN JOINTS [   ], NECK ACHE [   ], LOW BACK ACHE [   ],  SKIN : ULCERS [   ], RASH [   ], ITCHING [   ]  CNS: HEAD ACHE [   ], DOUBLE VISION [   ], BLURRED VISION [   ], AMS / CONFUSION [   ], SEIZURES [   ], WEAKNESS [   ],TINGLING / NUMBNESS [   ]    PHYSICAL EXAMINATION:  GENERAL APPEARANCE: NO DISTRESS  HEENT:  NO PALLOR, NO  JVD,  NO   NODES, NECK SUPPLE  CVS: S1 +, S2 +,   RS: AEEB,  OCCASIONAL  RALES +,   NO RONCHI  ABD: SOFT, NT, NO, BS +  EXT: PE+  SKIN: WARM,   SKELETAL:  ROM ACCEPTABLE  CNS:  AAO X 2-3    RADIOLOGY :    RADIOLOGY AND READINGS REVIEWED    ASSESSMENT :       PLAN:  HPI:  64 y/o M, A&Ox3, bedbound, chronic daley catheter with CKD (baseline creat 3.8), COPD, CHFpEF, BPH, PVD, Lymphedema, decubitus ulcers (Sacrum/gluteal) , Anemia, poly-neuropathy, poly-arthritis, hypothyroidism, seizures and Bipolar disorder was BIB EMS from [place] for abdominal pain, [place]. Mr. Rhoades states that he developed acute onset sharp abdominal pain 4 days ago a/w "brown" emesis (witnessed by EMS). Unable to tolerate food/drink x 4 days. Having normal bowel movements, last this morning 5/28. Denies any fevers. NH paperwork states concern for obstruction.  Patient endorses BLOODY EMESIS x 4 days upon further questioning. (29 May 2023 01:36)    # SEPSIS S/T P.AERUGINOSA BACTEREMIA + COMPLICATED UTI  - ON CEFEPIME, F/U BCX AND UCX  - ID CONSULT  - CRITICAL CARE EVALUATION IN PROGRESS    # R/O GI BLEED  # ESOPHAGITIS  # ANEMIA OF CHRONIC DISEASE - MONITOR HGB, TRANSFUSION THRESHOLD HGB < 8  - S/P EGD - ULCERATIVE ESOPHAGITIS  - PPI BID  - CARAFATE QID  - ADVANCING DIET PER GI RECOMMENDATION   - GI CONSULT    # CHRONIC HYPOXIC RESPIRATORY FAILURE S/T UNDERLYING COPD  - ON PRN O2  - CRITICAL CARE EVALUATION IN PROGRESS    # SHEYLA ON CKD   # CHRONIC DALEY, BPH  - DALEY   - STRICT IS AND OS  - AVOID NEPHROTOXIC AGENTS  - MONITOR CR  - NEPHROLOGY CONSULT    # AMBULATORY DYSFUNCTION, IMPAIRED GAIT S/T OA, PVD, PERIPHERAL NEUROPATHY  - FALL PRECAUTIONS    # DYSPHAGIA - ON MODIFIED DIET PER SWALLOW EVAL    # HX OF SEIZURE DX  - ON VALPROIC ACID    # HYPOTHYROIDISM - ON LEVOTHYROXINE    # PRESSURE ULCERS  - PATIENT IS HIGH RISK FOR PRESSURE ULCERS DESPITE PREVENTIVE MEASURES IN PLACE  - WOUND CARE CONSULT    # GI PPX

## 2023-06-01 NOTE — PROGRESS NOTE ADULT - ASSESSMENT
1. SHEYLA due to hypovolemia state  -Scr improves to 3.1mg/dL and below the baseline   -FeNa <1%. CT scan shows no hydro but perinephric stranding.  -Adjust meds to eGFR and avoid IV Gadolinium contrast,NSAIDs, and phosphate enema.  -Monitor I/O's daily.   -Monitor SMA daily.  2. CKD stage 4 most likely due to ischemic nephropathy  -baseline scr around 3.7mg/dL in Dec after ATN with renal recovery.  -Keep patient euvolemic and renal diet  -Avoid Nephrotoxic Meds/ Agents such as (NSAIDs, IV contrast, Aminoglycosides such as gentamicin, -Gadolinium contrast, Phosphate containing enemas, etc..)  -Adjust Medications according to eGFR  3. Hypotension  -bp is stable   -s/p 3L NS on admission  -monitor BP.  4. Mineral Bone Disease:  -phos is okay without binders  -PTH is low and no need vitamin d analog.  5. Anemia of CKD  -Hematemesis being monitor but stable hb. s/p EGD on 5/30.  -iron panel/ferritin noted; pt may benefit from SUREKHA.    -F/u CBC daily  -transfuse if HB < 7.0.  6. Hypokalemia:  -supplement K to keep >4.0  -monitor K.   7. UTI with chronic daley's cath.  -on iv cefepime  -f/u urine culture  -TOV done today.  8. Acidosis  -abg noted.   -on sodium bicarbonate 650mg tid.  -monitor CO2.     Discussed the assessment and plan with Primary Team/Nurse

## 2023-06-01 NOTE — PROGRESS NOTE ADULT - NS ATTEND AMEND GEN_ALL_CORE FT
IMP: This is a  63 year old Man , from [place] [id number], ambulates with walker , chronic FC, h/o obesity, HTN, HLD, PVD, Seizures, CKD4 (base SCr 3.8), anemia, BPH, Lymphedema, hypothyroidism, HFpEF,  COPD, decubitus ulcer (sacrum/gluteal), polyneuropathy, polyarthritis, bipolar disorder. Patient presented to ED for abdominal pain, nausea and vomiting brown emesis. Patient admitted to ICU for Hypotension likely from Sepsis secondary to complicated UTI and esophagitis. CT head was negative for acute changes. CT abd showed circumferential wall thickening of distal esophagus. GI consulted recommending endoscopy for esophagitis.  Blood culture resulting Gram negative rods, on cefepime, Urine culture testing, likely will be colonized. Patient with pressure injury pending wound care consult.  COPD stable at present time     Sugg  - Continue iv antibx   - Repeat BC   - Albuterol inhaler q6h   - Wound care   - PPI and carafate as per GI

## 2023-06-01 NOTE — PROGRESS NOTE ADULT - PROBLEM SELECTOR PLAN 4
likely in the setting GIB and chronic disease.  Currently no signs of active bleeding.  Continue to monitor.  Transfuse for Hgb less than 7.0. H&H stable.   No active bleeding  Continue PPI and Carafate  GI F/u outpatient  CBC in AM

## 2023-06-02 NOTE — PROGRESS NOTE ADULT - SUBJECTIVE AND OBJECTIVE BOX
NEPHROLOGY MEDICAL CARE, [facility] - Dr. Taj Valenzuela/ Dr. Hazel Solis/ Dr. Cosmo Root/ Dr. Fang Gifford    Date of Service: 06-02-23 @ 13:56    Patient was seen and examined at bedside.    CC: patient is okay and NAD    Vital Signs Last 24 Hrs  T(C): 36.3 (02 Jun 2023 12:30), Max: 36.6 (01 Jun 2023 20:33)  T(F): 97.4 (02 Jun 2023 12:30), Max: 97.9 (01 Jun 2023 20:33)  HR: 94 (02 Jun 2023 12:30) (94 - 99)  BP: 96/61 (02 Jun 2023 12:30) (96/61 - 126/77)  BP(mean): --  RR: 20 (02 Jun 2023 12:30) (18 - 20)  SpO2: 99% (02 Jun 2023 12:30) (99% - 100%)    Parameters below as of 02 Jun 2023 12:30  Patient On (Oxygen Delivery Method): room air        06-01 @ 07:01  -  06-02 @ 07:00  --------------------------------------------------------  IN: 600 mL / OUT: 250 mL / NET: 350 mL        PHYSICAL EXAM:  General: No acute respiratory distress.  Eyes: conjunctiva and sclera clear  ENMT: Atraumatic, Normocephalic, supple, No JVD present.   Respiratory: Bilateral poor air entry; No rales, rhonchi, wheezing  Cardiovascular: S1S2+; no m/r/g  Gastrointestinal: Soft, Non-tender, Nondistended; Bowel sounds present  Neuro:  Awake and oriented.  Ext: pedal edema, No Cyanosis  Skin: No visible rashes    MEDICATIONS:  MEDICATIONS  (STANDING):  albuterol    90 MICROgram(s) HFA Inhaler 2 Puff(s) Inhalation every 6 hours  buPROPion XL (24-Hour) . 150 milliGRAM(s) Oral daily  cefepime   IVPB      cefepime   IVPB 1000 milliGRAM(s) IV Intermittent every 12 hours  chlorhexidine 2% Cloths 1 Application(s) Topical <User Schedule>  divalproex  milliGRAM(s) Oral two times a day  dorzolamide 2% Ophthalmic Solution 1 Drop(s) Right EYE <User Schedule>  finasteride 5 milliGRAM(s) Oral daily  heparin   Injectable 5000 Unit(s) SubCutaneous every 8 hours  mupirocin 2% Ointment 1 Application(s) Both Nostrils two times a day  pantoprazole    Tablet 40 milliGRAM(s) Oral two times a day  potassium phosphate / sodium phosphate Tablet (K-PHOS No. 2) 1 Tablet(s) Oral once  sodium bicarbonate 650 milliGRAM(s) Oral three times a day  sucralfate suspension 1 Gram(s) Oral four times a day  tamsulosin 0.4 milliGRAM(s) Oral at bedtime  traZODone 50 milliGRAM(s) Oral at bedtime  vitamin A &amp; D Ointment 1 Application(s) Topical at bedtime    MEDICATIONS  (PRN):  metoclopramide Injectable 5 milliGRAM(s) IV Push every 6 hours PRN nausea/vomit          LABS:                        8.5    8.25  )-----------( 227      ( 02 Jun 2023 07:02 )             27.4     06-02    141  |  118<H>  |  64<H>  ----------------------------<  101<H>  3.9   |  15<L>  |  3.11<H>    Ca    10.3      02 Jun 2023 07:02  Phos  2.1     06-02  Mg     2.4     06-01    TPro  5.5<L>  /  Alb  1.7<L>  /  TBili  0.3  /  DBili  x   /  AST  <3<L>  /  ALT  <6<L>  /  AlkPhos  93  06-02        Phosphorus Level, Serum: 2.1 mg/dL (06-02 @ 07:02)    Urine studies    PTH and Vit D:

## 2023-06-02 NOTE — PROGRESS NOTE ADULT - PROBLEM SELECTOR PLAN 10
Continue Wellbutrin and Trazadone.  Maintain safety measures. Continue Wellbutrin, Trazadone and Valproic acid.   Maintain safety measures.

## 2023-06-02 NOTE — PROGRESS NOTE ADULT - PROBLEM SELECTOR PLAN 8
SHEYLA on CKD  stage 4. likely pre-renal  BP goal normotensive. Avoid hypotension.   Creatinine improved to 3.52>>3.18;  Baseline around 3.7  CMP and phos levels daily  Avoid nephrotoxic agents.  Dr Valenzuela following.  Monitor UO  ABG 7.28/31/93/15/-10.9  C/w Sodium Bicarb 650 mg TID SHEYLA on CKD  stage 4. likely pre-renal  BP goal normotensive. Avoid hypotension.   Creatinine improved to 3.52>>3.18>>3.11  Baseline around 3.7  CMP and phos levels daily  Avoid nephrotoxic agents.  Dr Valenzuela following.  Monitor UO    #Metabolic Acidosis  ABG 7.28/31/93/15/-10.9  C/w Sodium Bicarb 650 mg TID

## 2023-06-02 NOTE — PROGRESS NOTE ADULT - PROBLEM SELECTOR PLAN 12
Plan to return to [facility] pending repeat of blood culture results  PT reccs TG.  Advance diet as tolerated. GI f/u outpatient.  DVT ppx: Heparin SC  GI ppx: PPI  Patient to f/u outpatient with Nephro  and GI. Plan to return to [facility] pending repeat of blood culture results  PT reccs TG.  Advance diet as tolerated. GI f/u outpatient.  DVT ppx: Heparin SC  GI ppx: PPI  Patient to f/u outpatient with Nephro  and GI.  Pending repeat blood cx.

## 2023-06-02 NOTE — PROGRESS NOTE ADULT - NS ATTEND AMEND GEN_ALL_CORE FT
64 y/o M, A&Ox3, walks with walker+assistance, chronic daley(since Dec 2022, failed TOV since), CKD4, HFpEF, BPH, PVD, Lymphedema, decubitus ulcers (Sacrum/gluteal) , Anemia, poly-neuropathy, poly-arthritis, hypothyroidism, seizures and Bipolar disorder was BIB EMS from [place] for abdominal pain, NV. Admit to icu for hypotension. Patient endorses dark colored emesis     Assessment:  1. Sepsis  2. Vomiting  3. Catheter associated complicated UTI  4. SHEYLA on CKD stage 4   5. Seizure disorder  6. HFpEF  7. Pressure injury  8. Pseudomonas BSI    Plan:  - No further episodes of vomiting or bleeding reported  - Noted with esophageal thickening on CT scan  - Advance diet   - cont. PPI   - Follow up repeat blood cultures for clearance  - Cont. Antibiotics   - ID consult appreciated  - Cont. BPH medications  - Passed void trial  - DVT prophylaxis  - PT evaluation

## 2023-06-02 NOTE — PROGRESS NOTE ADULT - PROBLEM SELECTOR PLAN 6
Continue valproic acid 500 mg BID.   Valproic acid level less than 40.   Maintain seizure precautions.  No s/s of seizures.

## 2023-06-02 NOTE — PROGRESS NOTE ADULT - SUBJECTIVE AND OBJECTIVE BOX
63y Male    Meds:  cefepime   IVPB      cefepime   IVPB 1000 milliGRAM(s) IV Intermittent every 12 hours    Allergies    No Known Allergies    Intolerances        VITALS:  Vital Signs Last 24 Hrs  T(C): 36.3 (02 Jun 2023 12:30), Max: 36.6 (01 Jun 2023 20:33)  T(F): 97.4 (02 Jun 2023 12:30), Max: 97.9 (01 Jun 2023 20:33)  HR: 83 (02 Jun 2023 14:50) (83 - 99)  BP: 97/56 (02 Jun 2023 14:50) (96/61 - 126/77)  BP(mean): --  RR: 20 (02 Jun 2023 12:30) (19 - 20)  SpO2: 100% (02 Jun 2023 14:50) (99% - 100%)    Parameters below as of 02 Jun 2023 12:30  Patient On (Oxygen Delivery Method): room air        LABS/DIAGNOSTIC TESTS:                          8.5    8.25  )-----------( 227      ( 02 Jun 2023 07:02 )             27.4         06-02    141  |  118<H>  |  64<H>  ----------------------------<  101<H>  3.9   |  15<L>  |  3.11<H>    Ca    10.3      02 Jun 2023 07:02  Phos  2.1     06-02  Mg     2.4     06-01    TPro  5.5<L>  /  Alb  1.7<L>  /  TBili  0.3  /  DBili  x   /  AST  <3<L>  /  ALT  <6<L>  /  AlkPhos  93  06-02      LIVER FUNCTIONS - ( 02 Jun 2023 07:02 )  Alb: 1.7 g/dL / Pro: 5.5 g/dL / ALK PHOS: 93 U/L / ALT: <6 U/L DA / AST: <3 U/L / GGT: x             CULTURES: .Blood Blood  06-01 @ 10:52   No growth to date.  --  --      .Blood Blood  06-01 @ 10:46   No growth to date.  --  --      .Blood Blood-Peripheral  05-28 @ 21:09   No growth to date.  --  Blood Culture PCR  Pseudomonas aeruginosa      Clean Catch Clean Catch (Midstream)  05-28 @ 21:02   >=3 organisms. Probable collection contamination.  --  --            RADIOLOGY:      ROS:  [  ] UNABLE TO ELICIT 63y Male who is doing well clinically but does c/o some nausea but not vomiting, he has no fevers , chills, abdominal pain , coughing or SOB. His repeat blood cultures are negative to date.     Meds:  cefepime   IVPB      cefepime   IVPB 1000 milliGRAM(s) IV Intermittent every 12 hours    Allergies    No Known Allergies    Intolerances        VITALS:  Vital Signs Last 24 Hrs  T(C): 36.3 (02 Jun 2023 12:30), Max: 36.6 (01 Jun 2023 20:33)  T(F): 97.4 (02 Jun 2023 12:30), Max: 97.9 (01 Jun 2023 20:33)  HR: 83 (02 Jun 2023 14:50) (83 - 99)  BP: 97/56 (02 Jun 2023 14:50) (96/61 - 126/77)  BP(mean): --  RR: 20 (02 Jun 2023 12:30) (19 - 20)  SpO2: 100% (02 Jun 2023 14:50) (99% - 100%)    Parameters below as of 02 Jun 2023 12:30  Patient On (Oxygen Delivery Method): room air        LABS/DIAGNOSTIC TESTS:                          8.5    8.25  )-----------( 227      ( 02 Jun 2023 07:02 )             27.4         06-02    141  |  118<H>  |  64<H>  ----------------------------<  101<H>  3.9   |  15<L>  |  3.11<H>    Ca    10.3      02 Jun 2023 07:02  Phos  2.1     06-02  Mg     2.4     06-01    TPro  5.5<L>  /  Alb  1.7<L>  /  TBili  0.3  /  DBili  x   /  AST  <3<L>  /  ALT  <6<L>  /  AlkPhos  93  06-02      LIVER FUNCTIONS - ( 02 Jun 2023 07:02 )  Alb: 1.7 g/dL / Pro: 5.5 g/dL / ALK PHOS: 93 U/L / ALT: <6 U/L DA / AST: <3 U/L / GGT: x             CULTURES: .Blood Blood  06-01 @ 10:52   No growth to date.  --  --      .Blood Blood  06-01 @ 10:46   No growth to date.  --  --      .Blood Blood-Peripheral  05-28 @ 21:09   No growth to date.  --  Blood Culture PCR  Pseudomonas aeruginosa      Clean Catch Clean Catch (Midstream)  05-28 @ 21:02   >=3 organisms. Probable collection contamination.  --  --            RADIOLOGY:      ROS:  [  ] UNABLE TO ELICIT

## 2023-06-02 NOTE — PROGRESS NOTE ADULT - ASSESSMENT
Bacteremia - with Pseudomonas  UTI - complicated      Plan - Cont Maxipime 1 gm iv q12hrs  repeat blood cultures are negative  When ready for discharge will put on Cipro 250mgs po BID to complete a total of 14 days of all antibiotics( Maxipime and cipro )

## 2023-06-02 NOTE — PROGRESS NOTE ADULT - PROBLEM SELECTOR PLAN 5
likely multifactorial 2/2 GIB vs iron deficiency anemia vs chronic disease (CKD4).   Currently no signs of active bleeding.  Continue to monitor.  Transfuse for Hgb less than 7.0.

## 2023-06-02 NOTE — PROGRESS NOTE ADULT - SUBJECTIVE AND OBJECTIVE BOX
Time of Visit:  Patient seen and examined. pat is layin gin bed comfortable     MEDICATIONS  (STANDING):  albuterol    90 MICROgram(s) HFA Inhaler 2 Puff(s) Inhalation every 6 hours  buPROPion XL (24-Hour) . 150 milliGRAM(s) Oral daily  cefepime   IVPB      cefepime   IVPB 1000 milliGRAM(s) IV Intermittent every 12 hours  chlorhexidine 2% Cloths 1 Application(s) Topical <User Schedule>  divalproex  milliGRAM(s) Oral two times a day  dorzolamide 2% Ophthalmic Solution 1 Drop(s) Right EYE <User Schedule>  finasteride 5 milliGRAM(s) Oral daily  heparin   Injectable 5000 Unit(s) SubCutaneous every 8 hours  mupirocin 2% Ointment 1 Application(s) Both Nostrils two times a day  pantoprazole    Tablet 40 milliGRAM(s) Oral two times a day  sodium bicarbonate 650 milliGRAM(s) Oral three times a day  sucralfate suspension 1 Gram(s) Oral four times a day  tamsulosin 0.4 milliGRAM(s) Oral at bedtime  traZODone 50 milliGRAM(s) Oral at bedtime  vitamin A &amp; D Ointment 1 Application(s) Topical at bedtime      MEDICATIONS  (PRN):  metoclopramide Injectable 5 milliGRAM(s) IV Push every 6 hours PRN nausea/vomit       Medications up to date at time of exam.      PHYSICAL EXAMINATION:  Patient has no new complaints.  GENERAL: The patient is a well-developed, well-nourished, in no apparent distress.     Vital Signs Last 24 Hrs  T(C): 36.3 (02 Jun 2023 12:30), Max: 36.6 (01 Jun 2023 20:33)  T(F): 97.4 (02 Jun 2023 12:30), Max: 97.9 (01 Jun 2023 20:33)  HR: 83 (02 Jun 2023 14:50) (83 - 99)  BP: 97/56 (02 Jun 2023 14:50) (96/61 - 126/77)  BP(mean): --  RR: 20 (02 Jun 2023 12:30) (19 - 20)  SpO2: 100% (02 Jun 2023 14:50) (99% - 100%)    Parameters below as of 02 Jun 2023 12:30  Patient On (Oxygen Delivery Method): room air       (if applicable)    Chest Tube (if applicable)    HEENT: Head is normocephalic and atraumatic. Extraocular muscles are intact. Mucous membranes are moist.     NECK: Supple, no palpable adenopathy.    LUNGS: Clear to auscultation, no wheezing, rales, or rhonchi.    HEART: Regular rate and rhythm without murmur.    ABDOMEN: Soft, nontender, and nondistended.  No hepatosplenomegaly is noted.    : daley d/ed    EXTREMITIES: Without any cyanosis, clubbing, rash, lesions or edema.    NEUROLOGIC: Awake, alert, oriented, grossly intact    SKIN: Warm, dry, good turgor.      LABS:                        8.5    8.25  )-----------( 227      ( 02 Jun 2023 07:02 )             27.4     06-02    141  |  118<H>  |  64<H>  ----------------------------<  101<H>  3.9   |  15<L>  |  3.11<H>    Ca    10.3      02 Jun 2023 07:02  Phos  2.1     06-02  Mg     2.4     06-01    TPro  5.5<L>  /  Alb  1.7<L>  /  TBili  0.3  /  DBili  x   /  AST  <3<L>  /  ALT  <6<L>  /  AlkPhos  93  06-02                        MICROBIOLOGY: (if applicable)    RADIOLOGY & ADDITIONAL STUDIES:  EKG:   CXR:  ECHO:    IMPRESSION: 63y Male PAST MEDICAL & SURGICAL HISTORY:  Hypothyroid      Hyperlipidemia      Ambulatory dysfunction      Anemia      History of BPH      CKD (chronic kidney disease)      Chronic obstructive pulmonary disease (COPD)      Bipolar disorder      HLD (hyperlipidemia)      BPH (benign prostatic hyperplasia)      Chronic diastolic congestive heart failure      Lymphedema      Chronic leg pain      No significant past surgical history       p/w            IMP: This is a  63 year old Man , from [place] [address], ambulates with walker , chronic FC, h/o obesity, HTN, HLD, PVD, Seizures, CKD4 (base SCr 3.8), anemia, BPH, Lymphedema, hypothyroidism, HFpEF,  COPD, decubitus ulcer (sacrum/gluteal), polyneuropathy, polyarthritis, bipolar disorder. Patient presented to ED for abdominal pain, nausea and vomiting brown emesis. Patient admitted to ICU for Hypotension likely from Sepsis secondary to complicated UTI and esophagitis. CT head was negative for acute changes. CT abd showed circumferential wall thickening of distal esophagus. GI consulted recommending endoscopy for esophagitis.  Blood culture resulting Gram negative rods, on cefepime, Urine culture testing, likely will be colonized. Patient with pressure injury pending wound care consult.  COPD stable at present time     Sugg  - Continue iv antibx   - Repeat BC   - Albuterol inhaler q6h   - Wound care   - PPI and carafate as per GI.  - Dlaey cath d/c'ed

## 2023-06-02 NOTE — PROGRESS NOTE ADULT - SUBJECTIVE AND OBJECTIVE BOX
Patient is a 63y old  Male who presents with a chief complaint of sepsis (02 Jun 2023 09:59)    PATIENT IS SEEN AND EXAMINED IN MEDICAL FLOOR.  DORIST [    ]    ALYSIA [   ]      GT [   ]    ALLERGIES:  No Known Allergies      Daily     Daily     VITALS:    Vital Signs Last 24 Hrs  T(C): 36.3 (02 Jun 2023 12:30), Max: 36.6 (01 Jun 2023 20:33)  T(F): 97.4 (02 Jun 2023 12:30), Max: 97.9 (01 Jun 2023 20:33)  HR: 94 (02 Jun 2023 12:30) (94 - 99)  BP: 96/61 (02 Jun 2023 12:30) (96/61 - 126/77)  BP(mean): --  RR: 20 (02 Jun 2023 12:30) (18 - 20)  SpO2: 99% (02 Jun 2023 12:30) (99% - 100%)    Parameters below as of 02 Jun 2023 12:30  Patient On (Oxygen Delivery Method): room air        LABS:    CBC Full  -  ( 02 Jun 2023 07:02 )  WBC Count : 8.25 K/uL  RBC Count : 2.71 M/uL  Hemoglobin : 8.5 g/dL  Hematocrit : 27.4 %  Platelet Count - Automated : 227 K/uL  Mean Cell Volume : 101.1 fl  Mean Cell Hemoglobin : 31.4 pg  Mean Cell Hemoglobin Concentration : 31.0 gm/dL  Auto Neutrophil # : x  Auto Lymphocyte # : x  Auto Monocyte # : x  Auto Eosinophil # : x  Auto Basophil # : x  Auto Neutrophil % : x  Auto Lymphocyte % : x  Auto Monocyte % : x  Auto Eosinophil % : x  Auto Basophil % : x      06-02    141  |  118<H>  |  64<H>  ----------------------------<  101<H>  3.9   |  15<L>  |  3.11<H>    Ca    10.3      02 Jun 2023 07:02  Phos  2.1     06-02  Mg     2.4     06-01    TPro  5.5<L>  /  Alb  1.7<L>  /  TBili  0.3  /  DBili  x   /  AST  <3<L>  /  ALT  <6<L>  /  AlkPhos  93  06-02    CAPILLARY BLOOD GLUCOSE            LIVER FUNCTIONS - ( 02 Jun 2023 07:02 )  Alb: 1.7 g/dL / Pro: 5.5 g/dL / ALK PHOS: 93 U/L / ALT: <6 U/L DA / AST: <3 U/L / GGT: x           Creatinine Trend: 3.11<--, 3.18<--, 3.52<--, 3.81<--, 4.12<--, 4.46<--  I&O's Summary    01 Jun 2023 07:01  -  02 Jun 2023 07:00  --------------------------------------------------------  IN: 600 mL / OUT: 250 mL / NET: 350 mL        ABG - ( 31 May 2023 16:49 )  pH, Arterial: 7.28  pH, Blood: x     /  pCO2: 31    /  pO2: 93    / HCO3: 15    / Base Excess: -10.9 /  SaO2: 100                 .Blood Blood-Peripheral  05-28 @ 21:09   No growth to date.  --  Blood Culture PCR  Pseudomonas aeruginosa      Clean Catch Clean Catch (Midstream)  05-28 @ 21:02   >=3 organisms. Probable collection contamination.  --  --          MEDICATIONS:    MEDICATIONS  (STANDING):  albuterol    90 MICROgram(s) HFA Inhaler 2 Puff(s) Inhalation every 6 hours  buPROPion XL (24-Hour) . 150 milliGRAM(s) Oral daily  cefepime   IVPB      cefepime   IVPB 1000 milliGRAM(s) IV Intermittent every 12 hours  chlorhexidine 2% Cloths 1 Application(s) Topical <User Schedule>  divalproex  milliGRAM(s) Oral two times a day  dorzolamide 2% Ophthalmic Solution 1 Drop(s) Right EYE <User Schedule>  finasteride 5 milliGRAM(s) Oral daily  heparin   Injectable 5000 Unit(s) SubCutaneous every 8 hours  levothyroxine 125 MICROGram(s) Oral daily  mupirocin 2% Ointment 1 Application(s) Both Nostrils two times a day  pantoprazole    Tablet 40 milliGRAM(s) Oral two times a day  sodium bicarbonate 650 milliGRAM(s) Oral three times a day  sucralfate suspension 1 Gram(s) Oral four times a day  tamsulosin 0.4 milliGRAM(s) Oral at bedtime  traZODone 50 milliGRAM(s) Oral at bedtime  vitamin A &amp; D Ointment 1 Application(s) Topical at bedtime      MEDICATIONS  (PRN):  metoclopramide Injectable 5 milliGRAM(s) IV Push every 6 hours PRN nausea/vomit      REVIEW OF SYSTEMS:                           ALL ROS DONE [ X   ]    CONSTITUTIONAL:  LETHARGIC [   ], FEVER [   ], UNRESPONSIVE [   ]  CVS:  CP  [   ], SOB, [   ], PALPITATIONS [   ], DIZZYNESS [   ]  RS: COUGH [   ], SPUTUM [   ]  GI: ABDOMINAL PAIN [   ], NAUSEA [   ], VOMITINGS [   ], DIARRHEA [   ], CONSTIPATION [   ]  :  DYSURIA [   ], NOCTURIA [   ], INCREASED FREQUENCY [   ], DRIBLING [   ],  SKELETAL: PAINFUL JOINTS [   ], SWOLLEN JOINTS [   ], NECK ACHE [   ], LOW BACK ACHE [   ],  SKIN : ULCERS [   ], RASH [   ], ITCHING [   ]  CNS: HEAD ACHE [   ], DOUBLE VISION [   ], BLURRED VISION [   ], AMS / CONFUSION [   ], SEIZURES [   ], WEAKNESS [   ],TINGLING / NUMBNESS [   ]      PHYSICAL EXAMINATION:  GENERAL APPEARANCE: NO DISTRESS  HEENT:  NO PALLOR, NO  JVD,  NO   NODES, NECK SUPPLE  CVS: S1 +, S2 +,   RS: AEEB,  OCCASIONAL  RALES +,   NO RONCHI  ABD: SOFT, NT, NO, BS +  EXT: PE+  SKIN: WARM,   SKELETAL:  ROM ACCEPTABLE  CNS:  AAO X 2-3    RADIOLOGY :    RADIOLOGY AND READINGS REVIEWED    ASSESSMENT :       PLAN:  HPI:  62 y/o M, A&Ox3, bedbound, chronic daley catheter with CKD (baseline creat 3.8), COPD, CHFpEF, BPH, PVD, Lymphedema, decubitus ulcers (Sacrum/gluteal) , Anemia, poly-neuropathy, poly-arthritis, hypothyroidism, seizures and Bipolar disorder was BIB EMS from [place] for abdominal pain, NV. Mr. Rhoades states that he developed acute onset sharp abdominal pain 4 days ago a/w "brown" emesis (witnessed by EMS). Unable to tolerate food/drink x 4 days. Having normal bowel movements, last this morning 5/28. Denies any fevers. NH paperwork states concern for obstruction.  Patient endorses BLOODY EMESIS x 4 days upon further questioning. (29 May 2023 01:36)    # SEPSIS S/T P.AERUGINOSA BACTEREMIA + COMPLICATED UTI  - ON CEFEPIME, F/U BCX AND UCX  - ID CONSULT  - CRITICAL CARE EVALUATION IN PROGRESS    # R/O GI BLEED  # ESOPHAGITIS  # ANEMIA OF CHRONIC DISEASE - MONITOR HGB, TRANSFUSION THRESHOLD HGB < 8  - S/P EGD - ULCERATIVE ESOPHAGITIS  - PPI BID  - CARAFATE QID  - ADVANCING DIET PER GI RECOMMENDATION   - GI CONSULT    # CHRONIC HYPOXIC RESPIRATORY FAILURE S/T UNDERLYING COPD  - ON PRN O2  - CRITICAL CARE EVALUATION IN PROGRESS    # SHEYLA ON CKD   # CHRONIC DALEY, BPH  - DALEY   - STRICT IS AND OS  - AVOID NEPHROTOXIC AGENTS  - MONITOR CR  - NEPHROLOGY CONSULT    # AMBULATORY DYSFUNCTION, IMPAIRED GAIT S/T OA, PVD, PERIPHERAL NEUROPATHY  - FALL PRECAUTIONS    # DYSPHAGIA - ON MODIFIED DIET PER SWALLOW EVAL    # HX OF SEIZURE DX  - ON VALPROIC ACID    # HYPOTHYROIDISM - ON LEVOTHYROXINE    # PRESSURE ULCERS  - PATIENT IS HIGH RISK FOR PRESSURE ULCERS DESPITE PREVENTIVE MEASURES IN PLACE  - WOUND CARE CONSULT    # GI PPX

## 2023-06-02 NOTE — PROGRESS NOTE ADULT - PROBLEM SELECTOR PLAN 3
S/P EGD  +Ulcerative esophagitis  Continue PPI and Carafate /needs 2 weeks  Tolerating diet  F/u with GI outpatient

## 2023-06-02 NOTE — PROGRESS NOTE ADULT - SUBJECTIVE AND OBJECTIVE BOX
GAYLA PEARCE    SCU progress note    INTERVAL HPI/OVERNIGHT EVENTS: ***    DNR [ ]   DNI  [  ]    Covid - 19 PCR:     The 4Ms    What Matters Most: see GOC  Age appropriate Medications/Screen for High Risk Medication: Yes  Mentation: see CAM below  Mobility: defer to physical exam    The Confusion Assessment Method (CAM) Diagnostic Algorithm     1: Acute Onset or Fluctuating Course  - Is there evidence of an acute change in mental status from the patient’s baseline? Did the (abnormal) behavior  fluctuate during the day, that is, tend to come and go, or increase and decrease in severity?  [ ] YES [ ] NO     2: Inattention  - Did the patient have difficulty focusing attention, being easily distractible, or having difficulty keeping track of what was being said?  [ ] YES [ ] NO     3: Disorganized thinking  -Was the patient’s thinking disorganized or incoherent, such as rambling or irrelevant conversation, unclear or illogical flow of ideas, or unpredictable switching from subject to subject?  [ ] YES [ ] NO    4: Altered Level of consciousness?  [ ] YES [ ] NO    The diagnosis of delirium by CAM requires the presence of features 1 and 2 and either 3 or 4.    PRESSORS: [ ] YES [ ] NO  cefepime   IVPB      cefepime   IVPB 1000 milliGRAM(s) IV Intermittent every 12 hours    Cardiovascular:  Heart Failure  Acute   Acute on Chronic  Chronic         Pulmonary:  albuterol    90 MICROgram(s) HFA Inhaler 2 Puff(s) Inhalation every 6 hours    Hematalogic:  heparin   Injectable 5000 Unit(s) SubCutaneous every 8 hours    Other:  buPROPion XL (24-Hour) . 150 milliGRAM(s) Oral daily  chlorhexidine 2% Cloths 1 Application(s) Topical <User Schedule>  divalproex  milliGRAM(s) Oral two times a day  dorzolamide 2% Ophthalmic Solution 1 Drop(s) Right EYE <User Schedule>  finasteride 5 milliGRAM(s) Oral daily  levothyroxine 125 MICROGram(s) Oral daily  metoclopramide Injectable 5 milliGRAM(s) IV Push every 6 hours PRN  mupirocin 2% Ointment 1 Application(s) Both Nostrils two times a day  pantoprazole    Tablet 40 milliGRAM(s) Oral two times a day  sodium bicarbonate 650 milliGRAM(s) Oral three times a day  sucralfate suspension 1 Gram(s) Oral four times a day  tamsulosin 0.4 milliGRAM(s) Oral at bedtime  traZODone 50 milliGRAM(s) Oral at bedtime  vitamin A &amp; D Ointment 1 Application(s) Topical at bedtime    albuterol    90 MICROgram(s) HFA Inhaler 2 Puff(s) Inhalation every 6 hours  buPROPion XL (24-Hour) . 150 milliGRAM(s) Oral daily  cefepime   IVPB 1000 milliGRAM(s) IV Intermittent every 12 hours  cefepime   IVPB      chlorhexidine 2% Cloths 1 Application(s) Topical <User Schedule>  divalproex  milliGRAM(s) Oral two times a day  dorzolamide 2% Ophthalmic Solution 1 Drop(s) Right EYE <User Schedule>  finasteride 5 milliGRAM(s) Oral daily  heparin   Injectable 5000 Unit(s) SubCutaneous every 8 hours  levothyroxine 125 MICROGram(s) Oral daily  metoclopramide Injectable 5 milliGRAM(s) IV Push every 6 hours PRN  mupirocin 2% Ointment 1 Application(s) Both Nostrils two times a day  pantoprazole    Tablet 40 milliGRAM(s) Oral two times a day  sodium bicarbonate 650 milliGRAM(s) Oral three times a day  sucralfate suspension 1 Gram(s) Oral four times a day  tamsulosin 0.4 milliGRAM(s) Oral at bedtime  traZODone 50 milliGRAM(s) Oral at bedtime  vitamin A &amp; D Ointment 1 Application(s) Topical at bedtime    Drug Dosing Weight  Height (cm): 172.7 (28 May 2023 19:50)  Weight (kg): 83.9 (28 May 2023 19:50)  BMI (kg/m2): 28.1 (28 May 2023 19:50)  BSA (m2): 1.98 (28 May 2023 19:50)    CENTRAL LINE: [ ] YES [ ] NO  LOCATION:   DATE INSERTED:  REMOVE: [ ] YES [ ] NO  EXPLAIN:    HATFIELD: [ ] YES [ ] NO    DATE INSERTED:  REMOVE:  [ ] YES [ ] NO  EXPLAIN:    PAST MEDICAL & SURGICAL HISTORY:  Hypothyroid      Hyperlipidemia      Ambulatory dysfunction      Anemia      History of BPH      CKD (chronic kidney disease)      Chronic obstructive pulmonary disease (COPD)      Bipolar disorder      HLD (hyperlipidemia)      BPH (benign prostatic hyperplasia)      Chronic diastolic congestive heart failure      Lymphedema      Chronic leg pain      No significant past surgical history            ABG - ( 31 May 2023 16:49 )  pH, Arterial: 7.28  pH, Blood: x     /  pCO2: 31    /  pO2: 93    / HCO3: 15    / Base Excess: -10.9 /  SaO2: 100           06-01 @ 07:01  -  06-02 @ 07:00  --------------------------------------------------------  IN: 600 mL / OUT: 250 mL / NET: 350 mL    PHYSICAL EXAM:    GENERAL: NAD, well-groomed, well-developed  HEAD:  Atraumatic, Normocephalic  EYES: EOMI, PERRLA, conjunctiva and sclera clear  ENMT:  Moist mucous membranes  NECK: Supple, No JVD, Normal thyroid  NERVOUS SYSTEM:  Alert & Oriented X3  CHEST/LUNG: Decreased at the bases bilaterally; No rales, rhonchi, wheezing, or rubs. On RA.   HEART: Regular rate and rhythm; No murmurs, rubs, or gallops  ABDOMEN: Soft, Nontender, Nondistended; Bowel sounds present  EXTREMITIES:  1+ Peripheral Pulses, No clubbing, cyanosis, + trace pitting  edema lower exts.   SKIN: Discoloration of lower exts.       LABS:  CBC Full  -  ( 02 Jun 2023 07:02 )  WBC Count : 8.25 K/uL  RBC Count : 2.71 M/uL  Hemoglobin : 8.5 g/dL  Hematocrit : 27.4 %  Platelet Count - Automated : 227 K/uL  Mean Cell Volume : 101.1 fl  Mean Cell Hemoglobin : 31.4 pg  Mean Cell Hemoglobin Concentration : 31.0 gm/dL  Auto Neutrophil # : x  Auto Lymphocyte # : x  Auto Monocyte # : x  Auto Eosinophil # : x  Auto Basophil # : x  Auto Neutrophil % : x  Auto Lymphocyte % : x  Auto Monocyte % : x  Auto Eosinophil % : x  Auto Basophil % : x    06-02    141  |  118<H>  |  64<H>  ----------------------------<  101<H>  3.9   |  15<L>  |  3.11<H>    Ca    10.3      02 Jun 2023 07:02  Phos  2.1     06-02  Mg     2.4     06-01    TPro  5.5<L>  /  Alb  1.7<L>  /  TBili  0.3  /  DBili  x   /  AST  <3<L>  /  ALT  <6<L>  /  AlkPhos  93  06-02      [  ]  DVT Prophylaxis  [  ]  Nutrition, Brand, Rate         Goal Rate        Abnormal Nutritional Status -  Malnutrition   Cachexia      Morbid Obesity BMI >/=40    RADIOLOGY & ADDITIONAL STUDIES:   < from: CT Abdomen and Pelvis No Cont (05.28.23 @ 22:34) >  ACC: [id number] EXAM:  CT ABDOMEN AND PELVIS   ORDERED BY: GERHARD REDDY     PROCEDURE DATE:  05/28/2023          INTERPRETATION:  CLINICAL INFORMATION: Abdominal pain and vomiting. Rule   out obstruction.    COMPARISON: CT of the abdomen and pelvis from 12/1/2022.    CONTRAST/COMPLICATIONS:  IV Contrast: NONE  Evaluation of the visceral organs is limited without   intravenous contrast  Oral Contrast: NONE  Complications: None reported at time of study completion    PROCEDURE:  CT of the Abdomen and Pelvis was performed.  Sagittal and coronal reformats were performed.    FINDINGS:  LOWER CHEST: Bibasilar subsegmental atelectasis. Circumferential wall   thickening of the distal esophagus.    LIVER: Within normal limits.  BILE DUCTS: Normal caliber.  GALLBLADDER: Cholecystectomy.  SPLEEN: Within normal limits.  PANCREAS: Within normal limits.  ADRENALS: Within normal limits.  KIDNEYS/URETERS: Redemonstrated moderate nonspecific bilateral   perinephric fat stranding. No hydronephrosis.    BLADDER: Collapsed around a Hatfield catheter balloon. Intravesicular gas   secondary to instrumentation. Circumferential wall thickening.  REPRODUCTIVE ORGANS: Prostate within normal limits.    BOWEL: No bowel obstruction or inflammation. Scattered colonic   diverticulosis without diverticulitis. Appendix is normal.  PERITONEUM: No ascites.  VESSELS: Infrarenal IVC filter.  RETROPERITONEUM/LYMPH NODES: No lymphadenopathy.  ABDOMINAL WALL: Small bilateral fat-containing inguinal hernias.  BONES: Old right rib fractures. Severe right hip degenerative change with   remodeling of the right femoral head. Degenerative changes of the spine.    IMPRESSION:  1. No bowel obstruction or inflammation.  2. Circumferential wall thickening of the distal esophagus which could be   due to an esophagitis.  3. Circumferential urinary bladder wall thickening which could be due to   underdistention versus a cystitis. Correlate with urinalysis.        --- End of Report ---          GERHARD HAGAN MD; Attending Radiologist  This document has been electronically signed. May 28 2023 11:30PM    < end of copied text >           GAYLA PEARCE    SCU progress note    INTERVAL HPI/OVERNIGHT EVENTS: No events overnight.     Full code    Covid - 19 PCR: 5/28/2023 Non-reactive.     The 4Ms    What Matters Most: see GOC  Age appropriate Medications/Screen for High Risk Medication: Yes  Mentation: see CAM below  Mobility: defer to physical exam    The Confusion Assessment Method (CAM) Diagnostic Algorithm     1: Acute Onset or Fluctuating Course  - Is there evidence of an acute change in mental status from the patient’s baseline? Did the (abnormal) behavior  fluctuate during the day, that is, tend to come and go, or increase and decrease in severity?  [ ] YES [x ] NO     2: Inattention  - Did the patient have difficulty focusing attention, being easily distractible, or having difficulty keeping track of what was being said?  [ ] YES [x ] NO     3: Disorganized thinking  -Was the patient’s thinking disorganized or incoherent, such as rambling or irrelevant conversation, unclear or illogical flow of ideas, or unpredictable switching from subject to subject?  [ ] YES [x ] NO    4: Altered Level of consciousness?  [ ] YES [x ] NO    The diagnosis of delirium by CAM requires the presence of features 1 and 2 and either 3 or 4.    PRESSORS: [ ] YES [ x] NO  cefepime   IVPB      cefepime   IVPB 1000 milliGRAM(s) IV Intermittent every 12 hours    Cardiovascular:  Heart Failure  Acute   Acute on Chronic  Chronic         Pulmonary:  albuterol    90 MICROgram(s) HFA Inhaler 2 Puff(s) Inhalation every 6 hours    Hematalogic:  heparin   Injectable 5000 Unit(s) SubCutaneous every 8 hours    Other:  buPROPion XL (24-Hour) . 150 milliGRAM(s) Oral daily  chlorhexidine 2% Cloths 1 Application(s) Topical <User Schedule>  divalproex  milliGRAM(s) Oral two times a day  dorzolamide 2% Ophthalmic Solution 1 Drop(s) Right EYE <User Schedule>  finasteride 5 milliGRAM(s) Oral daily  levothyroxine 125 MICROGram(s) Oral daily  metoclopramide Injectable 5 milliGRAM(s) IV Push every 6 hours PRN  mupirocin 2% Ointment 1 Application(s) Both Nostrils two times a day  pantoprazole    Tablet 40 milliGRAM(s) Oral two times a day  sodium bicarbonate 650 milliGRAM(s) Oral three times a day  sucralfate suspension 1 Gram(s) Oral four times a day  tamsulosin 0.4 milliGRAM(s) Oral at bedtime  traZODone 50 milliGRAM(s) Oral at bedtime  vitamin A &amp; D Ointment 1 Application(s) Topical at bedtime    albuterol    90 MICROgram(s) HFA Inhaler 2 Puff(s) Inhalation every 6 hours  buPROPion XL (24-Hour) . 150 milliGRAM(s) Oral daily  cefepime   IVPB 1000 milliGRAM(s) IV Intermittent every 12 hours  cefepime   IVPB      chlorhexidine 2% Cloths 1 Application(s) Topical <User Schedule>  divalproex  milliGRAM(s) Oral two times a day  dorzolamide 2% Ophthalmic Solution 1 Drop(s) Right EYE <User Schedule>  finasteride 5 milliGRAM(s) Oral daily  heparin   Injectable 5000 Unit(s) SubCutaneous every 8 hours  levothyroxine 125 MICROGram(s) Oral daily  metoclopramide Injectable 5 milliGRAM(s) IV Push every 6 hours PRN  mupirocin 2% Ointment 1 Application(s) Both Nostrils two times a day  pantoprazole    Tablet 40 milliGRAM(s) Oral two times a day  sodium bicarbonate 650 milliGRAM(s) Oral three times a day  sucralfate suspension 1 Gram(s) Oral four times a day  tamsulosin 0.4 milliGRAM(s) Oral at bedtime  traZODone 50 milliGRAM(s) Oral at bedtime  vitamin A &amp; D Ointment 1 Application(s) Topical at bedtime    Drug Dosing Weight  Height (cm): 172.7 (28 May 2023 19:50)  Weight (kg): 83.9 (28 May 2023 19:50)  BMI (kg/m2): 28.1 (28 May 2023 19:50)  BSA (m2): 1.98 (28 May 2023 19:50)    CENTRAL LINE: [ ] YES [ x] NO  LOCATION:   DATE INSERTED:  REMOVE: [ ] YES [ ] NO  EXPLAIN:    HATFIELD: [ ] YES [x ] NO    DATE INSERTED:  REMOVE:  [ ] YES [ ] NO  EXPLAIN:    PAST MEDICAL & SURGICAL HISTORY:  Hypothyroid      Hyperlipidemia      Ambulatory dysfunction      Anemia      History of BPH      CKD (chronic kidney disease)      Chronic obstructive pulmonary disease (COPD)      Bipolar disorder      HLD (hyperlipidemia)      BPH (benign prostatic hyperplasia)      Chronic diastolic congestive heart failure      Lymphedema      Chronic leg pain      No significant past surgical history      ABG - ( 31 May 2023 16:49 )  pH, Arterial: 7.28  pH, Blood: x     /  pCO2: 31    /  pO2: 93    / HCO3: 15    / Base Excess: -10.9 /  SaO2: 100         06-01 @ 07:01  -  06-02 @ 07:00  --------------------------------------------------------  IN: 600 mL / OUT: 250 mL / NET: 350 mL    PHYSICAL EXAM:  GENERAL: NAD, well-groomed, well-developed  HEAD:  Atraumatic, Normocephalic  EYES: EOMI, PERRLA, conjunctiva and sclera clear  ENMT:  Moist mucous membranes  NECK: Supple  NERVOUS SYSTEM:  Alert & Oriented X3. Moves all exts. spontaneously.   CHEST/LUNG: Decreased at the bases bilaterally; No rales, rhonchi, wheezing, or rubs. On RA.   HEART: Regular rate and rhythm; No murmurs, rubs, or gallops  ABDOMEN: Soft, Nontender, Nondistended; Bowel sounds present  EXTREMITIES:  1+ Peripheral Pulses, No clubbing, cyanosis, + trace pitting  edema lower exts.   SKIN: Discoloration of lower exts.       LABS:  CBC Full  -  ( 02 Jun 2023 07:02 )  WBC Count : 8.25 K/uL  RBC Count : 2.71 M/uL  Hemoglobin : 8.5 g/dL  Hematocrit : 27.4 %  Platelet Count - Automated : 227 K/uL  Mean Cell Volume : 101.1 fl  Mean Cell Hemoglobin : 31.4 pg  Mean Cell Hemoglobin Concentration : 31.0 gm/dL  Auto Neutrophil # : x  Auto Lymphocyte # : x  Auto Monocyte # : x  Auto Eosinophil # : x  Auto Basophil # : x  Auto Neutrophil % : x  Auto Lymphocyte % : x  Auto Monocyte % : x  Auto Eosinophil % : x  Auto Basophil % : x    06-02    141  |  118<H>  |  64<H>  ----------------------------<  101<H>  3.9   |  15<L>  |  3.11<H>    Ca    10.3      02 Jun 2023 07:02  Phos  2.1     06-02  Mg     2.4     06-01    TPro  5.5<L>  /  Alb  1.7<L>  /  TBili  0.3  /  DBili  x   /  AST  <3<L>  /  ALT  <6<L>  /  AlkPhos  93  06-02      [ x ]  DVT Prophylaxis  [ x ]  Nutrition: Clear liquids.     RADIOLOGY & ADDITIONAL STUDIES:   < from: CT Abdomen and Pelvis No Cont (05.28.23 @ 22:34) >  ACC: [id number] EXAM:  CT ABDOMEN AND PELVIS   ORDERED BY: GERHARD REDDY     PROCEDURE DATE:  05/28/2023        INTERPRETATION:  CLINICAL INFORMATION: Abdominal pain and vomiting. Rule   out obstruction.    COMPARISON: CT of the abdomen and pelvis from 12/1/2022.    CONTRAST/COMPLICATIONS:  IV Contrast: NONE  Evaluation of the visceral organs is limited without   intravenous contrast  Oral Contrast: NONE  Complications: None reported at time of study completion    PROCEDURE:  CT of the Abdomen and Pelvis was performed.  Sagittal and coronal reformats were performed.    FINDINGS:  LOWER CHEST: Bibasilar subsegmental atelectasis. Circumferential wall   thickening of the distal esophagus.    LIVER: Within normal limits.  BILE DUCTS: Normal caliber.  GALLBLADDER: Cholecystectomy.  SPLEEN: Within normal limits.  PANCREAS: Within normal limits.  ADRENALS: Within normal limits.  KIDNEYS/URETERS: Redemonstrated moderate nonspecific bilateral   perinephric fat stranding. No hydronephrosis.    BLADDER: Collapsed around a Hatfield catheter balloon. Intravesicular gas   secondary to instrumentation. Circumferential wall thickening.  REPRODUCTIVE ORGANS: Prostate within normal limits.    BOWEL: No bowel obstruction or inflammation. Scattered colonic   diverticulosis without diverticulitis. Appendix is normal.  PERITONEUM: No ascites.  VESSELS: Infrarenal IVC filter.  RETROPERITONEUM/LYMPH NODES: No lymphadenopathy.  ABDOMINAL WALL: Small bilateral fat-containing inguinal hernias.  BONES: Old right rib fractures. Severe right hip degenerative change with   remodeling of the right femoral head. Degenerative changes of the spine.    IMPRESSION:  1. No bowel obstruction or inflammation.  2. Circumferential wall thickening of the distal esophagus which could be   due to an esophagitis.  3. Circumferential urinary bladder wall thickening which could be due to   underdistention versus a cystitis. Correlate with urinalysis.      --- End of Report ---      GERHARD HAGAN MD; Attending Radiologist  This document has been electronically signed. May 28 2023 11:30PM    < end of copied text >

## 2023-06-02 NOTE — PROGRESS NOTE ADULT - ASSESSMENT
Patient is a 63 year old Male, from [place], AOx3, ambulates with walker , chronic FC, h/o obesity, HTN, HLD, PVD, Seizures, CKD4 (base SCr 3.8), anemia, BPH, Lymphedema, hypothyroidism, HFpEF, possible COPD, decubitus ulcer (sacrum/gluteal), polyneuropathy, polyarthritis, bipolar disorder. Patient presented to ED for abdominal pain, nausea and vomiting brown emesis. Patient admitted to ICU for Hypotension likely from Sepsis secondary to complicated UTI and esophagitis. CT head was negative for acute changes. CT abd showed circumferential wall thickening of distal esophagus. GI consulted recommending endoscopy which took place on 5/30 with findings of ulcerative esophagitis. On PPI.   Blood cultures from 5/28 + for P. aeruginasa. Ucx likely contaminant. On cefepime.  Patient with pressure injury. Wound care consult. Nephrology following for SHEYLA on CKD.   06/01: Repeat blood cx pending. Advance diet as tolerated. PT reccs [facility] (5/30). Plan to return to [place] once repeat Bcx come back. C/w Cefepime for complicated UTI and P. Aeruginosa bacteriemia. Afebrile. BP soft this AM.    06/02:        Patient is a 63 year old Male, from [place], AOx3, ambulates with walker , chronic FC, h/o obesity, HTN, HLD, PVD, Seizures, CKD4 (base SCr 3.8), anemia, BPH, Lymphedema, hypothyroidism, HFpEF, possible COPD, decubitus ulcer (sacrum/gluteal), polyneuropathy, polyarthritis, bipolar disorder. Patient presented to ED for abdominal pain, nausea and vomiting brown emesis. Patient admitted to ICU for Hypotension likely from Sepsis secondary to complicated UTI and esophagitis. CT head was negative for acute changes. CT abd showed circumferential wall thickening of distal esophagus. GI consulted recommending endoscopy which took place on 5/30 with findings of ulcerative esophagitis. On PPI.   Blood cultures from 5/28 + for P. aeruginasa. Ucx likely contaminant. On cefepime.  Patient with pressure injury. Wound care consult. Nephrology following for SHEYLA on CKD.   06/01: Repeat blood cx pending. Advance diet as tolerated. PT reccs [facility] (5/30). Plan to return to [place] once repeat Bcx come back. C/w Cefepime for complicated UTI and P. Aeruginosa bacteriemia. Afebrile. BP soft this AM.    06/02: Repeat blood cx pending. On Cefepime. PT reccs [facility]. Dispo ([place]). Hemodynamically stable. Plan for OOB to chair.

## 2023-06-02 NOTE — PROGRESS NOTE ADULT - PROBLEM SELECTOR PLAN 9
Passed TOV this AM.   Will continue to monitor with bladder scan as patient has hx of not passing TOV.   Continue finasteride and tamsulosin. Passed TOV  Will continue to monitor with bladder scan as patient has hx of not passing TOV.   Continue finasteride and tamsulosin.    #Urinary retention  Continue Bladder scan Q 8 hrs  Condom cath in place.   Monitor UO

## 2023-06-02 NOTE — PROGRESS NOTE ADULT - PROBLEM SELECTOR PLAN 7
Continue synthroid  TSH in AM synthroid increased from 125 mcg to 137 mcg.   TSH 0.09 Free T4 1.2   Monitor TSH in 6 weeks.

## 2023-06-02 NOTE — PROGRESS NOTE ADULT - ASSESSMENT
1. SHEYLA due to hypovolemia state  -Scr remains unchanged at 3.1mg/dL and below the baseline   -FeNa <1%. CT scan shows no hydro but perinephric stranding.  -Adjust meds to eGFR and avoid IV Gadolinium contrast,NSAIDs, and phosphate enema.  -Monitor I/O's daily.   -Monitor SMA daily.  2. CKD stage 4 most likely due to ischemic nephropathy  -baseline scr around 3.7mg/dL in Dec after ATN with renal recovery.  -Keep patient euvolemic and renal diet  -Avoid Nephrotoxic Meds/ Agents such as (NSAIDs, IV contrast, Aminoglycosides such as gentamicin, -Gadolinium contrast, Phosphate containing enemas, etc..)  -Adjust Medications according to eGFR  3. Hypotension:  -bp is stable   -s/p 3L NS on admission  -monitor BP.  4. Mineral Bone Disease:  -phos is okay without binders  -PTH is low and no need vitamin d analog.  5. Anemia of CKD  -Hematemesis being monitor but stable hb. s/p EGD on 5/30.  -iron panel/ferritin noted; pt may benefit from SUREKHA.    -F/u CBC daily  -transfuse if HB < 7.0.  6. Hypokalemia:  -supplement K to keep >4.0  -monitor K.   7. UTI  -on iv cefepime  -f/u urine culture  -passed TOV  8. Acidosis  -abg noted.   -on sodium bicarbonate 650mg tid.  -monitor CO2.     Discussed the assessment and plan with Primary Team/Nurse 1. SHEYLA due to hypovolemia state  -Scr remains unchanged at 3.1mg/dL and below the baseline   -FeNa <1%. CT scan shows no hydro but perinephric stranding.  -Adjust meds to eGFR and avoid IV Gadolinium contrast,NSAIDs, and phosphate enema.  -Monitor I/O's daily.   -Monitor SMA daily.  2. CKD stage 4 most likely due to ischemic nephropathy  -baseline scr around 3.7mg/dL in Dec after ATN with renal recovery.  -Keep patient euvolemic and renal diet  -Avoid Nephrotoxic Meds/ Agents such as (NSAIDs, IV contrast, Aminoglycosides such as gentamicin, -Gadolinium contrast, Phosphate containing enemas, etc..)  -Adjust Medications according to eGFR  3. Hypotension:  -bp is stable   -s/p 3L NS on admission  -monitor BP.  4. Mineral Bone Disease:  -phos is okay without binders; low phos and phos supplemented.  -PTH is low and no need vitamin d analog.  5. Anemia of CKD  -Hematemesis being monitor but stable hb. s/p EGD on 5/30.  -iron panel/ferritin noted; pt may benefit from SUREKHA.    -F/u CBC daily  -transfuse if HB < 7.0.  6. Hypokalemia:  -supplement K to keep >4.0  -monitor K.   7. UTI  -on iv cefepime  -f/u urine culture  -passed TOV  8. Acidosis  -abg noted.   -on sodium bicarbonate 650mg tid.  -monitor CO2.     Discussed the assessment and plan with Primary Team/Nurse 1. SHEYLA due to hypovolemia state  -Scr remains unchanged at 3.1mg/dL and below the baseline   -FeNa <1%. CT scan shows no hydro but perinephric stranding.  -Adjust meds to eGFR and avoid IV Gadolinium contrast,NSAIDs, and phosphate enema.  -Monitor I/O's daily.   -Monitor SMA daily.  2. CKD stage 4 most likely due to ischemic nephropathy  -baseline scr around 3.7mg/dL in Dec after ATN with renal recovery.  -Keep patient euvolemic and renal diet  -Avoid Nephrotoxic Meds/ Agents such as (NSAIDs, IV contrast, Aminoglycosides such as gentamicin, -Gadolinium contrast, Phosphate containing enemas, etc..)  -Adjust Medications according to eGFR  3. Hypotension:  -bp is stable   -s/p 3L NS on admission  -monitor BP.  4. Mineral Bone Disease:  -phos is okay without binders; low phos and phos supplemented.  -PTH is low and no need vitamin d analog.  5. Anemia of CKD  -Hematemesis being monitor but stable hb. s/p EGD on 5/30.  -iron panel/ferritin noted; pt may benefit from SUREKHA.    -F/u CBC daily  -transfuse if HB < 7.0.  6. Hypokalemia:  -supplement K to keep >4.0  -monitor K.   7. UTI  -on iv cefepime  -f/u urine culture  -passed TOV  8. Acidosis  -abg noted.   -on sodium bicarbonate 650mg tid.  -monitor CO2.     Will be off service til 6/18 and back on 6/19; Dr. Olivier (nephro) will be covering.     Discussed the assessment and plan with Primary Team/Nurse

## 2023-06-02 NOTE — PROGRESS NOTE ADULT - PROBLEM SELECTOR PLAN 1
Secondary to Bacteremia and complicated UTI.  Blood culture positive for Gram negative rods/Pseudomonas  Ucx likely contaminant.   F/u  repeated blood cultures  Continue Cefepime.  TTE noted.   Afebrile.   Soft BP this AM  ID Dr Nolasco Secondary to Bacteremia and complicated UTI.  Blood culture positive for Gram negative rods/Pseudomonas  Ucx likely contaminant.   F/u  repeated blood cultures  Continue Cefepime.  TTE noted.   Afebrile.   ID Dr Nolasco

## 2023-06-03 NOTE — PROGRESS NOTE ADULT - PROBLEM SELECTOR PLAN 1
Secondary to Bacteremia and complicated UTI.  Blood culture positive for Gram negative rods/Pseudomonas  Ucx likely contaminant.   repeated blood cultures (6/1) NGTD  Afebrile  Continue Cefepime.  TTE noted.   Afebrile.   ID Dr Nolasco

## 2023-06-03 NOTE — PROGRESS NOTE ADULT - SUBJECTIVE AND OBJECTIVE BOX
GAYLA PEARCE    SCU progress note    INTERVAL HPI/OVERNIGHT EVENTS: No events overnight.     Full code    Covid - 19 PCR: 5/28/2023: non -reactive.     The 4Ms    What Matters Most: see GOC  Age appropriate Medications/Screen for High Risk Medication: Yes  Mentation: see CAM below  Mobility: defer to physical exam    The Confusion Assessment Method (CAM) Diagnostic Algorithm     1: Acute Onset or Fluctuating Course  - Is there evidence of an acute change in mental status from the patient’s baseline? Did the (abnormal) behavior  fluctuate during the day, that is, tend to come and go, or increase and decrease in severity?  [ ] YES [x ] NO     2: Inattention  - Did the patient have difficulty focusing attention, being easily distractible, or having difficulty keeping track of what was being said?  [ ] YES [x ] NO     3: Disorganized thinking  -Was the patient’s thinking disorganized or incoherent, such as rambling or irrelevant conversation, unclear or illogical flow of ideas, or unpredictable switching from subject to subject?  [ ] YES [x ] NO    4: Altered Level of consciousness?  [ ] YES [x ] NO    The diagnosis of delirium by CAM requires the presence of features 1 and 2 and either 3 or 4.    PRESSORS: [ ] YES [x ] NO  cefepime   IVPB 1000 milliGRAM(s) IV Intermittent every 12 hours  cefepime   IVPB        Cardiovascular:  Heart Failure  Acute   Acute on Chronic  Chronic         Pulmonary:  albuterol    90 MICROgram(s) HFA Inhaler 2 Puff(s) Inhalation every 6 hours    Hematalogic:  heparin   Injectable 5000 Unit(s) SubCutaneous every 8 hours    Other:  buPROPion XL (24-Hour) . 150 milliGRAM(s) Oral daily  chlorhexidine 2% Cloths 1 Application(s) Topical <User Schedule>  divalproex  milliGRAM(s) Oral two times a day  dorzolamide 2% Ophthalmic Solution 1 Drop(s) Right EYE <User Schedule>  finasteride 5 milliGRAM(s) Oral daily  levothyroxine 137 MICROGram(s) Oral daily  metoclopramide Injectable 5 milliGRAM(s) IV Push every 6 hours PRN  mupirocin 2% Ointment 1 Application(s) Both Nostrils two times a day  pantoprazole    Tablet 40 milliGRAM(s) Oral two times a day  sodium bicarbonate 650 milliGRAM(s) Oral three times a day  sucralfate suspension 1 Gram(s) Oral four times a day  tamsulosin 0.4 milliGRAM(s) Oral at bedtime  traZODone 50 milliGRAM(s) Oral at bedtime  vitamin A &amp; D Ointment 1 Application(s) Topical at bedtime    albuterol    90 MICROgram(s) HFA Inhaler 2 Puff(s) Inhalation every 6 hours  buPROPion XL (24-Hour) . 150 milliGRAM(s) Oral daily  cefepime   IVPB 1000 milliGRAM(s) IV Intermittent every 12 hours  cefepime   IVPB      chlorhexidine 2% Cloths 1 Application(s) Topical <User Schedule>  divalproex  milliGRAM(s) Oral two times a day  dorzolamide 2% Ophthalmic Solution 1 Drop(s) Right EYE <User Schedule>  finasteride 5 milliGRAM(s) Oral daily  heparin   Injectable 5000 Unit(s) SubCutaneous every 8 hours  levothyroxine 137 MICROGram(s) Oral daily  metoclopramide Injectable 5 milliGRAM(s) IV Push every 6 hours PRN  mupirocin 2% Ointment 1 Application(s) Both Nostrils two times a day  pantoprazole    Tablet 40 milliGRAM(s) Oral two times a day  sodium bicarbonate 650 milliGRAM(s) Oral three times a day  sucralfate suspension 1 Gram(s) Oral four times a day  tamsulosin 0.4 milliGRAM(s) Oral at bedtime  traZODone 50 milliGRAM(s) Oral at bedtime  vitamin A &amp; D Ointment 1 Application(s) Topical at bedtime    Drug Dosing Weight  Height (cm): 172.7 (28 May 2023 19:50)  Weight (kg): 83.9 (28 May 2023 19:50)  BMI (kg/m2): 28.1 (28 May 2023 19:50)  BSA (m2): 1.98 (28 May 2023 19:50)    CENTRAL LINE: [ ] YES [x ] NO  LOCATION:   DATE INSERTED:  REMOVE: [ ] YES [ ] NO  EXPLAIN:    HATFIELD: [ ] YES [x ] NO    DATE INSERTED:  REMOVE:  [ ] YES [ ] NO  EXPLAIN:    PAST MEDICAL & SURGICAL HISTORY:  Hypothyroid      Hyperlipidemia      Ambulatory dysfunction      Anemia      History of BPH      CKD (chronic kidney disease)      Chronic obstructive pulmonary disease (COPD)      Bipolar disorder      HLD (hyperlipidemia)      BPH (benign prostatic hyperplasia)      Chronic diastolic congestive heart failure      Lymphedema      Chronic leg pain      No significant past surgical history    ROS:     PHYSICAL EXAM:  GENERAL: NAD, well-groomed, well-developed  HEAD:  Atraumatic, Normocephalic  EYES: EOMI, PERRLA, conjunctiva and sclera clear  ENMT:  Moist mucous membranes  NECK: Supple  NERVOUS SYSTEM:  Alert & Oriented X3. Moves all exts. spontaneously.   CHEST/LUNG: Decreased at the bases bilaterally; No rales, rhonchi, wheezing, or rubs. On RA.   HEART: Regular rate and rhythm; No murmurs, rubs, or gallops  ABDOMEN: Soft, Nontender, Nondistended; Bowel sounds present  EXTREMITIES:  1+ Peripheral Pulses, No clubbing, cyanosis, + trace pitting  edema lower exts.   SKIN: Discoloration of lower exts. Stage 1 Pressure Injury to the Bilateral Heels, Bilateral Gluteus, and Coccyx areas;   Stage 2 Pressure Injury to the R. Gluteu.     LABS:  CBC Full  -  ( 03 Jun 2023 07:19 )  WBC Count : 8.08 K/uL  RBC Count : 2.81 M/uL  Hemoglobin : 8.6 g/dL  Hematocrit : 28.5 %  Platelet Count - Automated : 219 K/uL  Mean Cell Volume : 101.4 fl  Mean Cell Hemoglobin : 30.6 pg  Mean Cell Hemoglobin Concentration : 30.2 gm/dL  Auto Neutrophil # : x  Auto Lymphocyte # : x  Auto Monocyte # : x  Auto Eosinophil # : x  Auto Basophil # : x  Auto Neutrophil % : x  Auto Lymphocyte % : x  Auto Monocyte % : x  Auto Eosinophil % : x  Auto Basophil % : x    06-02    141  |  118<H>  |  64<H>  ----------------------------<  101<H>  3.9   |  15<L>  |  3.11<H>    Ca    10.3      02 Jun 2023 07:02  Phos  2.1     06-02    TPro  5.5<L>  /  Alb  1.7<L>  /  TBili  0.3  /  DBili  x   /  AST  <3<L>  /  ALT  <6<L>  /  AlkPhos  93  06-02    [x  ]  DVT Prophylaxis  [x  ]  Nutrition: Clear liquid    RADIOLOGY & ADDITIONAL STUDIES:    < from: CT Abdomen and Pelvis No Cont (05.28.23 @ 22:34) >  ACC: [id number] EXAM:  CT ABDOMEN AND PELVIS   ORDERED BY: GERHARD REDDY     PROCEDURE DATE:  05/28/2023          INTERPRETATION:  CLINICAL INFORMATION: Abdominal pain and vomiting. Rule   out obstruction.    COMPARISON: CT of the abdomen and pelvis from 12/1/2022.    CONTRAST/COMPLICATIONS:  IV Contrast: NONE  Evaluation of the visceral organs is limited without   intravenous contrast  Oral Contrast: NONE  Complications: None reported at time of study completion    PROCEDURE:  CT of the Abdomen and Pelvis was performed.  Sagittal and coronal reformats were performed.    FINDINGS:  LOWER CHEST: Bibasilar subsegmental atelectasis. Circumferential wall   thickening of the distal esophagus.    LIVER: Within normal limits.  BILE DUCTS: Normal caliber.  GALLBLADDER: Cholecystectomy.  SPLEEN: Within normal limits.  PANCREAS: Within normal limits.  ADRENALS: Within normal limits.  KIDNEYS/URETERS: Redemonstrated moderate nonspecific bilateral   perinephric fat stranding. No hydronephrosis.    BLADDER: Collapsed around a Hatfield catheter balloon. Intravesicular gas   secondary to instrumentation. Circumferential wall thickening.  REPRODUCTIVE ORGANS: Prostate within normal limits.    BOWEL: No bowel obstruction or inflammation. Scattered colonic   diverticulosis without diverticulitis. Appendix is normal.  PERITONEUM: No ascites.  VESSELS: Infrarenal IVC filter.  RETROPERITONEUM/LYMPH NODES: No lymphadenopathy.  ABDOMINAL WALL: Small bilateral fat-containing inguinal hernias.  BONES: Old right rib fractures. Severe right hip degenerative change with   remodeling of the right femoral head. Degenerative changes of the spine.    IMPRESSION:  1. No bowel obstruction or inflammation.  2. Circumferential wall thickening of the distal esophagus which could be   due to an esophagitis.  3. Circumferential urinary bladder wall thickening which could be due to   underdistention versus a cystitis. Correlate with urinalysis.        --- End of Report ---            GERHARD HAGAN MD; Attending Radiologist  This document has been electronically signed. May 28 2023 11:30PM    < end of copied text >  < from: CT Head No Cont (05.28.23 @ 22:33) >  ACC: [id number] EXAM:  CT BRAIN   ORDERED BY: GERHARD REDDY     PROCEDURE DATE:  05/28/2023          INTERPRETATION:  CLINICAL INDICATION: Altered mental status, vomiting.    TECHNIQUE:  Noncontrast CT of the head was performed.  Sagittal and coronal reformats were created.    COMPARISON STUDY: 12/4/2022    FINDINGS:    PARENCHYMA AND VENTRICLES: No acute intracranial hemorrhage, mass effect,   or midline shift. No hydrocephalus. Prominence of the sulci and   ventricles, consistent with age-related parenchymal volume loss; the   extent of ventricular and sulcal prominence appears somewhat increased   compared to 12/4/2022. Small vessel white matter changes.  EXTRA-AXIAL: No abnormal extraaxial collection.  PARANASAL SINUSES: Layering fluid in the right sphenoid sinus and   additional mild scattered mucosal thickening, as on prior.  TYMPANOMASTOID CAVITIES: Within normal limits.  ORBITS: Bilateral cataract surgery.  CALVARIUM: Within normal limits.  MISCELLANEOUS: Intracranial atherosclerosis.    IMPRESSION:  No acute intracranial hemorrhage, mass effect, or midline shift.  Paranasal sinus mucosal disease.  The extent of ventricular and sulcal prominence appears increased   compared with 12/4/2022, indeterminate clinical significance.    --- End of Report ---       UMM SIBLEY MD; Attending Radiologist  This document has been electronically signed. May 28 2023 11:22PM    < end of copied text >      Plan of care discussed with intensivist.

## 2023-06-03 NOTE — PROGRESS NOTE ADULT - PROBLEM SELECTOR PLAN 6
Hx of seizures.   Continue valproic acid 500 mg BID.   Valproic acid level less than 40 (Valproic acid for Bipolar disorder).   Maintain seizure precautions.  No s/s of seizures.

## 2023-06-03 NOTE — PROGRESS NOTE ADULT - PROBLEM SELECTOR PLAN 7
synthroid increased from 125 mcg to 137 mcg on 6/1.   TSH 0.09 Free T4 1.2   Monitor TSH in 6 weeks.

## 2023-06-03 NOTE — PROGRESS NOTE ADULT - PROBLEM SELECTOR PLAN 9
Passed TOV  Will continue to monitor with bladder scan as patient has hx of not passing TOV.   Continue finasteride and tamsulosin.    #Urinary retention  Continue Bladder scan Q 8 hrs  Condom cath in place.   Monitor UO

## 2023-06-03 NOTE — PROGRESS NOTE ADULT - SUBJECTIVE AND OBJECTIVE BOX
Time of Visit:  Patient seen and examined.     MEDICATIONS  (STANDING):  albuterol    90 MICROgram(s) HFA Inhaler 2 Puff(s) Inhalation every 6 hours  buPROPion XL (24-Hour) . 150 milliGRAM(s) Oral daily  cefepime   IVPB      cefepime   IVPB 1000 milliGRAM(s) IV Intermittent every 12 hours  chlorhexidine 2% Cloths 1 Application(s) Topical <User Schedule>  divalproex  milliGRAM(s) Oral two times a day  dorzolamide 2% Ophthalmic Solution 1 Drop(s) Right EYE <User Schedule>  finasteride 5 milliGRAM(s) Oral daily  heparin   Injectable 5000 Unit(s) SubCutaneous every 8 hours  levothyroxine 137 MICROGram(s) Oral daily  mupirocin 2% Ointment 1 Application(s) Both Nostrils two times a day  pantoprazole    Tablet 40 milliGRAM(s) Oral two times a day  sodium bicarbonate 650 milliGRAM(s) Oral three times a day  sucralfate suspension 1 Gram(s) Oral four times a day  tamsulosin 0.4 milliGRAM(s) Oral at bedtime  traZODone 50 milliGRAM(s) Oral at bedtime  vitamin A &amp; D Ointment 1 Application(s) Topical at bedtime      MEDICATIONS  (PRN):  metoclopramide Injectable 5 milliGRAM(s) IV Push every 6 hours PRN nausea/vomit       Medications up to date at time of exam.      PHYSICAL EXAMINATION:  Patient has no new complaints.  GENERAL: The patient is a well-developed, well-nourished, in no apparent distress.     Vital Signs Last 24 Hrs  T(C): 36.5 (03 Jun 2023 12:48), Max: 36.7 (03 Jun 2023 06:10)  T(F): 97.7 (03 Jun 2023 12:48), Max: 98.1 (03 Jun 2023 06:10)  HR: 86 (03 Jun 2023 12:48) (86 - 89)  BP: 112/65 (03 Jun 2023 12:48) (101/57 - 112/65)  BP(mean): --  RR: 18 (03 Jun 2023 12:48) (18 - 20)  SpO2: 100% (03 Jun 2023 12:48) (100% - 100%)    Parameters below as of 03 Jun 2023 12:48  Patient On (Oxygen Delivery Method): room air       (if applicable)    Chest Tube (if applicable)    HEENT: Head is normocephalic and atraumatic. Extraocular muscles are intact. Mucous membranes are moist.     NECK: Supple, no palpable adenopathy.    LUNGS: Clear to auscultation, no wheezing, rales, or rhonchi.    HEART: Regular rate and rhythm without murmur.    ABDOMEN: Soft, nontender, and nondistended.  No hepatosplenomegaly is noted.    : No painful voiding, no pelvic pain    EXTREMITIES: Without any cyanosis, clubbing, rash, lesions or edema.    NEUROLOGIC: Awake, alert, oriented, grossly intact    SKIN: Warm, dry, good turgor.      LABS:                        8.6    8.08  )-----------( 219      ( 03 Jun 2023 07:19 )             28.5     06-03    138  |  116<H>  |  62<H>  ----------------------------<  111<H>  3.7   |  16<L>  |  3.17<H>    Ca    10.4      03 Jun 2023 07:19  Phos  2.1     06-03  Mg     2.4     06-03    TPro  5.4<L>  /  Alb  1.7<L>  /  TBili  0.2  /  DBili  x   /  AST  <3<L>  /  ALT  <6<L>  /  AlkPhos  92  06-03                        MICROBIOLOGY: (if applicable)    RADIOLOGY & ADDITIONAL STUDIES:  EKG:   CXR:  ECHO:    IMPRESSION: 63y Male PAST MEDICAL & SURGICAL HISTORY:  Hypothyroid      Hyperlipidemia      Ambulatory dysfunction      Anemia      History of BPH      CKD (chronic kidney disease)      Chronic obstructive pulmonary disease (COPD)      Bipolar disorder      HLD (hyperlipidemia)      BPH (benign prostatic hyperplasia)      Chronic diastolic congestive heart failure      Lymphedema      Chronic leg pain      No significant past surgical history       p/w         IMP: This is a  63 year old Man , from [place] [id number], ambulates with walker , chronic FC, h/o obesity, HTN, HLD, PVD, Seizures, CKD4 (base SCr 3.8), anemia, BPH, Lymphedema, hypothyroidism, HFpEF,  COPD, decubitus ulcer (sacrum/gluteal), polyneuropathy, polyarthritis, bipolar disorder. Patient presented to ED for abdominal pain, nausea and vomiting brown emesis. Patient admitted to ICU for Hypotension likely from Sepsis secondary to complicated UTI and esophagitis. CT head was negative for acute changes. CT abd showed circumferential wall thickening of distal esophagus. GI consulted recommending endoscopy for esophagitis.  Blood culture resulting Gram negative rods, on cefepime, Urine culture testing, likely will be colonized. Patient with pressure injury pending wound care consult.  COPD stable at present time     Sugg  - Continue iv antibx   - Repeat BC neg  - Albuterol inhaler q6h   - Wound care   - PPI and carafate as per GI.  - [place]iera cath d/c'ed

## 2023-06-03 NOTE — PROGRESS NOTE ADULT - SUBJECTIVE AND OBJECTIVE BOX
Patient is a 63y old  Male who presents with a chief complaint of sepsis (03 Jun 2023 17:04)    PATIENT IS SEEN AND EXAMINED IN MEDICAL FLOOR.    DORIST [    ]    ALYSIA [   ]      GT [   ]    ALLERGIES:  No Known Allergies      Daily     Daily     VITALS:    Vital Signs Last 24 Hrs  T(C): 36.5 (03 Jun 2023 12:48), Max: 36.7 (03 Jun 2023 06:10)  T(F): 97.7 (03 Jun 2023 12:48), Max: 98.1 (03 Jun 2023 06:10)  HR: 86 (03 Jun 2023 12:48) (86 - 89)  BP: 112/65 (03 Jun 2023 12:48) (101/57 - 112/65)  BP(mean): --  RR: 18 (03 Jun 2023 12:48) (18 - 20)  SpO2: 100% (03 Jun 2023 12:48) (100% - 100%)    Parameters below as of 03 Jun 2023 12:48  Patient On (Oxygen Delivery Method): room air        LABS:    CBC Full  -  ( 03 Jun 2023 07:19 )  WBC Count : 8.08 K/uL  RBC Count : 2.81 M/uL  Hemoglobin : 8.6 g/dL  Hematocrit : 28.5 %  Platelet Count - Automated : 219 K/uL  Mean Cell Volume : 101.4 fl  Mean Cell Hemoglobin : 30.6 pg  Mean Cell Hemoglobin Concentration : 30.2 gm/dL  Auto Neutrophil # : x  Auto Lymphocyte # : x  Auto Monocyte # : x  Auto Eosinophil # : x  Auto Basophil # : x  Auto Neutrophil % : x  Auto Lymphocyte % : x  Auto Monocyte % : x  Auto Eosinophil % : x  Auto Basophil % : x      06-03    138  |  116<H>  |  62<H>  ----------------------------<  111<H>  3.7   |  16<L>  |  3.17<H>    Ca    10.4      03 Jun 2023 07:19  Phos  2.1     06-03  Mg     2.4     06-03    TPro  5.4<L>  /  Alb  1.7<L>  /  TBili  0.2  /  DBili  x   /  AST  <3<L>  /  ALT  <6<L>  /  AlkPhos  92  06-03    CAPILLARY BLOOD GLUCOSE            LIVER FUNCTIONS - ( 03 Jun 2023 07:19 )  Alb: 1.7 g/dL / Pro: 5.4 g/dL / ALK PHOS: 92 U/L / ALT: <6 U/L DA / AST: <3 U/L / GGT: x           Creatinine Trend: 3.17<--, 3.11<--, 3.18<--, 3.52<--, 3.81<--, 4.12<--  I&O's Summary          .Blood Blood  06-01 @ 10:52   No growth to date.  --  --      .Blood Blood  06-01 @ 10:46   No growth to date.  --  --      .Blood Blood-Peripheral  05-28 @ 21:09   No Growth Final  --  Blood Culture PCR  Pseudomonas aeruginosa      Clean Catch Clean Catch (Midstream)  05-28 @ 21:02   >=3 organisms. Probable collection contamination.  --  --          MEDICATIONS:    MEDICATIONS  (STANDING):  albuterol    90 MICROgram(s) HFA Inhaler 2 Puff(s) Inhalation every 6 hours  buPROPion XL (24-Hour) . 150 milliGRAM(s) Oral daily  cefepime   IVPB 1000 milliGRAM(s) IV Intermittent every 12 hours  cefepime   IVPB      chlorhexidine 2% Cloths 1 Application(s) Topical <User Schedule>  divalproex  milliGRAM(s) Oral two times a day  dorzolamide 2% Ophthalmic Solution 1 Drop(s) Right EYE <User Schedule>  finasteride 5 milliGRAM(s) Oral daily  heparin   Injectable 5000 Unit(s) SubCutaneous every 8 hours  levothyroxine 137 MICROGram(s) Oral daily  mupirocin 2% Ointment 1 Application(s) Both Nostrils two times a day  pantoprazole    Tablet 40 milliGRAM(s) Oral two times a day  sodium bicarbonate 650 milliGRAM(s) Oral three times a day  sucralfate suspension 1 Gram(s) Oral four times a day  tamsulosin 0.4 milliGRAM(s) Oral at bedtime  traZODone 50 milliGRAM(s) Oral at bedtime  vitamin A &amp; D Ointment 1 Application(s) Topical at bedtime      MEDICATIONS  (PRN):  metoclopramide Injectable 5 milliGRAM(s) IV Push every 6 hours PRN nausea/vomit        REVIEW OF SYSTEMS:                           ALL ROS DONE [ X   ]      CONSTITUTIONAL:  LETHARGIC [   ], FEVER [   ], UNRESPONSIVE [   ]  CVS:  CP  [   ], SOB, [   ], PALPITATIONS [   ], DIZZYNESS [   ]  RS: COUGH [   ], SPUTUM [   ]  GI: ABDOMINAL PAIN [   ], NAUSEA [   ], VOMITINGS [   ], DIARRHEA [   ], CONSTIPATION [   ]  :  DYSURIA [   ], NOCTURIA [   ], INCREASED FREQUENCY [   ], DRIBLING [   ],  SKELETAL: PAINFUL JOINTS [   ], SWOLLEN JOINTS [   ], NECK ACHE [   ], LOW BACK ACHE [   ],  SKIN : ULCERS [   ], RASH [   ], ITCHING [   ]  CNS: HEAD ACHE [   ], DOUBLE VISION [   ], BLURRED VISION [   ], AMS / CONFUSION [   ], SEIZURES [   ], WEAKNESS [   ],TINGLING / NUMBNESS [   ]      PHYSICAL EXAMINATION:    GENERAL APPEARANCE: NO DISTRESS  HEENT:  NO PALLOR, NO  JVD,  NO   NODES, NECK SUPPLE  CVS: S1 +, S2 +,   RS: AEEB,  OCCASIONAL  RALES +,   NO RONCHI  ABD: SOFT, NT, NO, BS +  EXT: PE+  SKIN: WARM,   SKELETAL:  ROM REDUCED AT CERVICAL & LS SPINE  CNS:  AAO X 2-3          RADIOLOGY :    RADIOLOGY AND READINGS REVIEWED            ASSESSMENT :       PLAN:  HPI:  62 y/o M, A&Ox3, bedbound, chronic daley catheter with CKD (baseline creat 3.8), COPD, CHFpEF, BPH, PVD, Lymphedema, decubitus ulcers (Sacrum/gluteal) , Anemia, poly-neuropathy, poly-arthritis, hypothyroidism, seizures and Bipolar disorder was BIB EMS from [place] for abdominal pain, [place]. Mr. Rhoades states that he developed acute onset sharp abdominal pain 4 days ago a/w "brown" emesis (witnessed by EMS). Unable to tolerate food/drink x 4 days. Having normal bowel movements, last this morning 5/28. Denies any fevers. NH paperwork states concern for obstruction.  Patient endorses BLOODY EMESIS x 4 days upon further questioning. (29 May 2023 01:36)      # SEPSIS S/T P.AERUGINOSA BACTEREMIA + COMPLICATED UTI    - ON CEFEPIME, F/U BCX AND UCX  - ID CONSULT  - CRITICAL CARE EVALUATION IN PROGRESS      # GI BLEED  # ESOPHAGITIS  # ANEMIA OF CHRONIC DISEASE - MONITOR HGB, TRANSFUSION THRESHOLD HGB < 8  - S/P EGD - ULCERATIVE ESOPHAGITIS  - PPI BID  - CARAFATE QID  - ADVANCING DIET PER GI RECOMMENDATION   - GI CONSULT        # ANEMIA DUE TO GI BLEED, ANEMIA OF CKD     # CHRONIC HYPOXIC RESPIRATORY FAILURE S/T UNDERLYING COPD  - ON PRN O2  - CRITICAL CARE EVALUATION IN PROGRESS      # AMS DUE TO ACUTE METABOLIC ENCEPHALOPATHY      # SHEYLA ON CKD STAGE 4   # CHRONIC DALEY, BPH  - DALEY   - STRICT IS AND OS  - AVOID NEPHROTOXIC AGENTS  - MONITOR CR  - NEPHROLOGY CONSULT        # AMBULATORY DYSFUNCTION, IMPAIRED GAIT DUE TO GENERALIZED MUSCLE WEAKNESS, CERVICAL & LS SPINAL STENOSIS, POLYARTRHITIS & PERIPHERAL NEUROPATHY. OP  - FALL PRECAUTIONS    # DYSPHAGIA DUE TO METABOLIC ENCEPHALOPATHY - ON MODIFIED DIET PER SWALLOW EVAL      # HX OF SEIZURE DX  - ON VALPROIC ACID    # HYPOTHYROIDISM - ON LEVOTHYROXINE    # PRESSURE ULCERS  - PATIENT IS HIGH RISK FOR PRESSURE ULCERS DESPITE PREVENTIVE MEASURES IN PLACE  - WOUND CARE CONSULT    # GI PPX    DR. ALDEN STORY

## 2023-06-03 NOTE — PROGRESS NOTE ADULT - PROBLEM SELECTOR PLAN 3
S/P EGD  +Ulcerative esophagitis  Continue PPI and Carafate /needs 2 weeks  Tolerating diet and will advance to full liquids.   F/u with GI outpatient

## 2023-06-03 NOTE — PROGRESS NOTE ADULT - PROBLEM SELECTOR PLAN 12
Plan to return to [facility].  Advance diet as tolerated. GI f/u outpatient.  DVT ppx: Heparin SC  GI ppx: PPI  Patient to f/u outpatient with Nephro  and GI.  CM to follow on placement.   Patient needs diet advanced and clearance from ID for discharge.

## 2023-06-03 NOTE — PROGRESS NOTE ADULT - PROBLEM SELECTOR PROBLEM 5
+Hypoglycemia, hypotension, trauma Anemia +Hypoglycemia, hypotension, trauma    72 yo female with CHF COPD DM on insulin presenting with hypoglycemia, hypotension, and LOC, will evaluate with cbc cmp troponin bnp, cxr, ekg, likely admit for hypoglycemia vs chf exacerbation vs copd exacerbation.

## 2023-06-03 NOTE — PROGRESS NOTE ADULT - ASSESSMENT
Patient is a 63 year old Male, from [place], AOx3, ambulates with walker , chronic FC, h/o obesity, HTN, HLD, PVD, Seizures, CKD4 (base SCr 3.8), anemia, BPH, Lymphedema, hypothyroidism, HFpEF, possible COPD, decubitus ulcer (sacrum/gluteal), polyneuropathy, polyarthritis, bipolar disorder. Patient presented to ED for abdominal pain, nausea and vomiting brown emesis. Patient admitted to ICU for Hypotension likely from Sepsis secondary to complicated UTI and esophagitis. CT head was negative for acute changes. CT abd showed circumferential wall thickening of distal esophagus. GI consulted recommending endoscopy which took place on 5/30 with findings of ulcerative esophagitis. On PPI.   Blood cultures from 5/28 + for P. aeruginasa. Ucx likely contaminant. On cefepime.  Patient with pressure injury. Wound care consult. Nephrology following for SHEYLA on CKD.   06/01: Repeat blood cx pending. Advance diet as tolerated. PT reccs TG (5/30). Plan to return to [place] once repeat Bcx come back. C/w Cefepime for complicated UTI and P. Aeruginosa bacteriemia. Afebrile. BP soft this AM.    06/02: Repeat blood cx pending. On Cefepime. PT reccs TG. Dispo ([place]). Hemodynamically stable. Plan for OOB to chair.   06/03: Repeat Bcx from 6/1 NGTD. Afebrile. On Cefepime. ID following. Hemodynamically stable. Plan to advance diet to full liquids.

## 2023-06-03 NOTE — PROGRESS NOTE ADULT - PROBLEM SELECTOR PLAN 8
SHEYLA on CKD  stage 4. likely pre-renal  BP goal normotensive. Avoid hypotension.   Creatinine improved.   Baseline around 3.7  CMP and phos levels daily  Avoid nephrotoxic agents.  Dr Valenzuela following.  Monitor UO    #Metabolic Acidosis  ABG 7.28/31/93/15/-10.9  C/w Sodium Bicarb 650 mg TID

## 2023-06-03 NOTE — PROGRESS NOTE ADULT - NS ATTEND AMEND GEN_ALL_CORE FT
62 y/o M, A&Ox3, walks with walker+assistance, chronic daley(since Dec 2022, failed TOV since), CKD4, HFpEF, BPH, PVD, Lymphedema, decubitus ulcers (Sacrum/gluteal) , Anemia, poly-neuropathy, poly-arthritis, hypothyroidism, seizures and Bipolar disorder was BIB EMS from [place] for abdominal pain, [place]. Admit to icu for hypotension. Patient endorses dark colored emesis     Assessment:  1. Sepsis  2. Vomiting  3. Catheter associated complicated UTI  4. SHEYLA on CKD stage 4   5. Seizure disorder  6. HFpEF  7. Pressure injury  8. Pseudomonas BSI    Plan:  - No further episodes of vomiting or bleeding reported  - Noted with esophageal thickening on CT scan  - Advance diet   - cont. PPI   - Repeat blood cultures negative thus far   - Cont. Antibiotics   - ID consult appreciated  - Cont. BPH medications  - Passed void trial  - Cont. sodium bicarb supplementation   - Nephrology follow up  - DVT prophylaxis  - PT evaluation  - Dispo planning to [facility] 62 y/o M, A&Ox3, walks with walker+assistance, chronic daley(since Dec 2022, failed TOV since), CKD4, HFpEF, BPH, PVD, Lymphedema, decubitus ulcers (Sacrum/gluteal) , Anemia, poly-neuropathy, poly-arthritis, hypothyroidism, seizures and Bipolar disorder was BIB EMS from [place] for abdominal pain, [place]. Admit to icu for hypotension. Patient endorses dark colored emesis     Assessment:  1. Sepsis  2. Vomiting  3. Catheter associated complicated UTI  4. SHEYLA on CKD stage 4   5. Seizure disorder  6. HFpEF  7. Pressure injury  8. Pseudomonas BSI    Plan:  - No further episodes of vomiting or bleeding reported  - Noted with esophageal thickening on CT scan  - Advance diet   - cont. PPI   - Repeat blood cultures negative thus far   - Cont. Antibiotics   - ID consult appreciated  - Cont. BPH medications  - Passed void trial  - Cont. sodium bicarb supplementation   - Nephrology follow up  - DVT prophylaxis  - PT evaluation  - Dispo planning

## 2023-06-03 NOTE — PROGRESS NOTE ADULT - PROBLEM SELECTOR PROBLEM 8
[facility]  Progress Note Ji Baires 1958, 58 y o  male MRN: [id number]  Unit/Bed#: -[facility] Encounter: [id number]  Primary Care Provider: Eve Gan DO   Date and time admitted to hospital: 10/13/2021  5:17 AM    Pseudogout of knee, left  Assessment & Plan  Pain and swelling in the left anterior knee, superiorly  Tender to touch  Developed after PT  Xray -unremarkable  Started on colchicine 0 6mg bid x1 day, the 0 6mg daily  Synovial fluid analysis shows calcium pyrophosphate crystals    Pain and swelling of left knee  Assessment & Plan  Pain and swelling in the left anterior knee, superiorly  Tender to touch  Developed after PT  Xray -unremarkable  Started on colchicine 0 6mg bid x1 day, the 0 6mg daily  Synovial fluid analysis shows calcium pyrophosphate crystals      Elevated INR  Assessment & Plan  Patient previously on Coumadin due to history of DVTs  This is currently on hold due to need for surgery  Ortho recommends 1 4-1 6 for ortho procedure  INR 1 47  Continue to hold Coumadin  Monitor INR daily  Will hold heparin drip at midnight    ROSE (acute kidney injury) [facility])  Assessment & Plan  ROSE on CKD  May be prerenal   Cr on  Pres 1 99  Baseline 1 21 months ago  Avoid nephrotoxics  Avoid hypotension  Monitor I/O  Continue Light hydration with isolyte at 75cc/h  History of DVT (deep vein thrombosis)  Assessment & Plan  History of DVT on both LE  Unprovoked  Last episode was 10yrs ago  On AC with coumadin  Currently held  INR now 1 89  Would need heparin if INR is subtherapeutic  DVT prophylaxis with venodyne   Daily INR    Heparin drip will be stopped at midnight for procedure tomorrow     Type 2 diabetes mellitus with diabetic neuropathy, without long-term current use of insulin [facility])  Assessment & Plan  Lab Results   Component Value Date    HGBA1C 9 0 (A) 06/07/2021       Recent Labs     10/14/21  1606 10/14/21  1953 10/15/21  0703 10/15/21  1138 POCGLU 153* 141* 158* 260*       Blood Sugar Average: Last 72 hrs:  (P) 232 75  Known diabetic with diabetic neuropathy  On metformin and lantus at home  Blood sugar on presentation 418  Contiunue sliding scale insulin  Hypoglycemic protocol  Goal blood sugar 140-180  Diabetic diet  Received Lantus 46 u in Am 10/17  Will continue Lantus 42 u from tomorrow as patient will be NPO       Mixed hyperlipidemia  Assessment & Plan  Continue statin  Essential hypertension  Assessment & Plan  On lisinopril 10mg daily at home  On hold for ROSE  BP stable  Monitor BP  * Closed fracture of proximal end of right tibia  Assessment & Plan  Presented with Right knee / proximal leg pain and inability to bear weight after ground level fall after twisting leg in his kitchen  Assoc deformity,mild abrasion  10/13-Imaging findings including CT confirmed Acute comminuted displaced intra-articular fracture of the proximal tibia as described above  No dislocation  Small lipohemarthrosis  Moderate distal femoral and proximal tibiofibular subcutaneous edema/contusion  On knee immobilizer  Non weight bearing  Orthopedic input noted and appreciated  Patient will need surgical correction of fracture  Continue analgesia, will increased dosing today  Add stool softeners  Will not administer corticosteroid injection at this time into left knee as patient is having surgery tomorrow on right knee  NPO from midnight  Anticoagulation held at midnight  Surgery will be performed tomorrow    Hyperkalemia-resolved as of 10/15/2021  Assessment & Plan  Mild -5 3 on presentation  Resolved  Monitor BMP  VTE Pharmacologic Prophylaxis:   VTE Score: 5 Moderate Risk (Score 3-4) - Pharmacological DVT Prophylaxis Ordered: Heparin Drip  Mechanical VTE Prophylaxis in Place: Yes    Patient Centered Rounds: I have performed bedside rounds with nursing staff today      Discussions with Specialists or Other Care Team Provider: Surgery    Education and Discussions with Family / Patient: Updated  (wife) at bedside  Attempted to call later with another update, left a voicemail    Current Length of Stay: 4 day(s)    Current Patient Status: Inpatient     Discharge Plan / Estimated Discharge Date: Anticipate discharge in 48-72 hrs to home  Code Status: Level 1 - Full Code      Subjective:   Patient continues to have bilateral kn    Objective:     Vitals:   Temp (24hrs), Av 5 °F (37 5 °C), Min:98 7 °F (37 1 °C), Max:101 1 °F (38 4 °C)    Temp:  [98 7 °F (37 1 °C)-101 1 °F (38 4 °C)] 98 8 °F (37 1 °C)  HR:  [92-98] 92  BP: (141-169)/(80-82) 169/80  SpO2:  [94 %-97 %] 95 %  Body mass index is 30 31 kg/m²  Input and Output Summary (last 24 hours): Intake/Output Summary (Last 24 hours) at 10/17/2021 1212  Last data filed at 10/17/2021 0544  Gross per 24 hour   Intake 1460 ml   Output 1025 ml   Net 435 ml       Physical Exam:     Physical Exam  Vitals reviewed  HENT:      Head: Normocephalic and atraumatic  Mouth/Throat:      Mouth: Mucous membranes are moist    Eyes:      Extraocular Movements: Extraocular movements intact  Pupils: Pupils are equal, round, and reactive to light  Cardiovascular:      Rate and Rhythm: Normal rate and regular rhythm  Pulses: Normal pulses  Heart sounds: Normal heart sounds  Pulmonary:      Effort: Pulmonary effort is normal  No respiratory distress  Breath sounds: Normal breath sounds  No stridor  No wheezing, rhonchi or rales  Chest:      Chest wall: No tenderness  Abdominal:      General: Abdomen is flat  Bowel sounds are normal    Musculoskeletal:      Cervical back: Normal range of motion  Comments: Bilateral tenderness in knee joint   Right knee pain inferior to patella   Skin:     General: Skin is warm and dry  Neurological:      Mental Status: He is alert and oriented to person, place, and time  Mental status is at baseline  Additional Data:     Labs:  Results from last 7 days   Lab Units 10/17/21  0333   WBC Thousand/uL 9 13   HEMOGLOBIN g/dL 9 0*   HEMATOCRIT % 27 4*   PLATELETS Thousands/uL 200   NEUTROS PCT % 73   LYMPHS PCT % 20   MONOS PCT % 6   EOS PCT % 1     Results from last 7 days   Lab Units 10/17/21  0333 10/15/21  0522   SODIUM mmol/L 139 139   POTASSIUM mmol/L 4 5 4 3   CHLORIDE mmol/L 103 104   CO2 mmol/L 26 25   BUN mg/dL 26* 28*   CREATININE mg/dL 1 61* 1 88*   ANION GAP mmol/L 10 10   CALCIUM mg/dL 8 4 8 6   ALBUMIN g/dL  --  2 6*   TOTAL BILIRUBIN mg/dL  --  1 57*   ALK PHOS U/L  --  79   ALT U/L  --  11*   AST U/L  --  18   GLUCOSE RANDOM mg/dL 240* 173*     Results from last 7 days   Lab Units 10/17/21  0328   INR  1 47*     Results from last 7 days   Lab Units 10/17/21  1152 10/17/21  0755 10/17/21  0123 10/16/21  2053 10/16/21  1646 10/16/21  1116 10/16/21  0750 10/15/21  2115 10/15/21  1604 10/15/21  1138 10/15/21  0703 10/14/21  1953   POC GLUCOSE mg/dl 271* 224* 207* 204* 199* 227* 201* 219* 280* 260* 158* 141*     Results from last 7 days   Lab Units 10/16/21  0507   HEMOGLOBIN A1C % 8 2*           Imaging: Reviewed radiology reports from this admission including: xray(s)    Recent Cultures (last 7 days):     Results from last 7 days   Lab Units 10/16/21  0925   GRAM STAIN RESULT  Rare Polys  No bacteria seen       Lines/Drains:  Invasive Devices     Peripheral Intravenous Line            Peripheral IV 10/16/21 Left;Ventral (anterior) Forearm 1 day                Telemetry:        Last 24 Hours Medication List:   Current Facility-Administered Medications   Medication Dose Route Frequency Provider Last Rate    acetaminophen  650 mg Oral Q6H PRN Barry Cordero MD      calcium carbonate  500 mg Oral Daily PRN Sayra Pandey PA-C      colchicine  0 6 mg Oral Daily Barry Cordero MD      Diclofenac Sodium  2 g Topical 4x Daily Awais Ruth MD      heparin (porcine)  3-20 Units/kg/hr (Order-Specific) Intravenous Titrated Brenda Fox MD 15 1 Units/kg/hr (10/17/21 1114)    heparin (porcine)  2,000 Units Intravenous Q1H PRN Raza Suarez MD      heparin (porcine)  4,000 Units Intravenous Q1H PRN Raza Suarez MD      HYDROmorphone  0 5 mg Intravenous Q3H PRN Amy Morocho MD      insulin glargine  4 Units Subcutaneous Once Brenda oFx MD     Danika Sagastume Ramp ON 10/18/2021] insulin glargine  46 Units Subcutaneous Daily Brenda Fox MD      insulin lispro  1-6 Units Subcutaneous 4x Daily (AC & HS) Sayra Pandey PA-C      lidocaine  1 patch Topical Daily Raza Suarez MD      multi-electrolyte  75 mL/hr Intravenous Continuous Brenda Fox MD 75 mL/hr (10/17/21 1056)    ondansetron  4 mg Intravenous Q6H PRN Raza Suarez MD      oxyCODONE  10 mg Oral Q12H [address] Raza Suarez MD      polyethylene glycol  17 g Oral Daily PRN Raza Suarez MD      pravastatin  40 mg Oral Daily With Jennifer Shahid MD      senna  2 tablet Oral BID Amy Morocho MD          Today, Patient Was Seen By: Brenda Fox MD    ** Please Note: This note has been constructed using a voice recognition system   ** CKD (chronic kidney disease)

## 2023-06-04 NOTE — PROGRESS NOTE ADULT - PROBLEM SELECTOR PLAN 1
Secondary to Bacteremia and complicated UTI.  Blood culture positive for Gram negative rods/Pseudomonas  Ucx likely contaminant.   repeated blood cultures (6/1) NGTD  Continue Cefepime- discharge Cipro 250mgs po BID to complete a total of 14 days of all antibiotics( Maxipime and cipro )  Last fay 6/10

## 2023-06-04 NOTE — PROGRESS NOTE ADULT - NS ATTEND AMEND GEN_ALL_CORE FT
64 y/o M, A&Ox3, walks with walker+assistance, chronic daley(since Dec 2022, failed TOV since), CKD4, HFpEF, BPH, PVD, Lymphedema, decubitus ulcers (Sacrum/gluteal) , Anemia, poly-neuropathy, poly-arthritis, hypothyroidism, seizures and Bipolar disorder was BIB EMS from [place] for abdominal pain, [place]. Admit to icu for hypotension. Patient endorses dark colored emesis     Assessment:  1. Sepsis  2. Vomiting  3. Catheter associated complicated UTI  4. SHEYLA on CKD stage 4   5. Seizure disorder  6. HFpEF  7. Pressure injury  8. Pseudomonas BSI    Plan:  - Tolerating diet  - cont. PPI   - Repeat blood cultures negative thus far   - Cont. Antibiotics per ID  - ID consult appreciated  - Cont. BPH medications  - Passed void trial  - Cont. sodium bicarb supplementation   - Nephrology follow up  - DVT prophylaxis  - PT evaluation  - Dispo planning

## 2023-06-04 NOTE — PROGRESS NOTE ADULT - SUBJECTIVE AND OBJECTIVE BOX
GAYLA PEARCE    SCU progress note    INTERVAL HPI/OVERNIGHT EVENTS: *** no overnight events    DNR [ ]   DNI  [  ] Full Code    Covid - 19 PCR: 5/28/23 negaitve    The 4Ms    What Matters Most: see GOC  Age appropriate Medications/Screen for High Risk Medication: Yes  Mentation: see CAM below  Mobility: defer to physical exam    The Confusion Assessment Method (CAM) Diagnostic Algorithm     1: Acute Onset or Fluctuating Course  - Is there evidence of an acute change in mental status from the patient’s baseline? Did the (abnormal) behavior  fluctuate during the day, that is, tend to come and go, or increase and decrease in severity?  [ ] YES [x] NO     2: Inattention  - Did the patient have difficulty focusing attention, being easily distractible, or having difficulty keeping track of what was being said?  [ ] YES [ x] NO     3: Disorganized thinking  -Was the patient’s thinking disorganized or incoherent, such as rambling or irrelevant conversation, unclear or illogical flow of ideas, or unpredictable switching from subject to subject?  [ ] YES [x ] NO    4: Altered Level of consciousness?  [ ] YES [x ] NO    The diagnosis of delirium by CAM requires the presence of features 1 and 2 and either 3 or 4.    PRESSORS: [ ] YES [ ] NO  cefepime   IVPB      cefepime   IVPB 1000 milliGRAM(s) IV Intermittent every 12 hours    Cardiovascular:  Heart Failure  Acute   Acute on Chronic  Chronic         Pulmonary:  albuterol    90 MICROgram(s) HFA Inhaler 2 Puff(s) Inhalation every 6 hours    Hematalogic:  heparin   Injectable 5000 Unit(s) SubCutaneous every 8 hours    Other:  buPROPion XL (24-Hour) . 150 milliGRAM(s) Oral daily  chlorhexidine 2% Cloths 1 Application(s) Topical <User Schedule>  divalproex  milliGRAM(s) Oral two times a day  dorzolamide 2% Ophthalmic Solution 1 Drop(s) Right EYE <User Schedule>  finasteride 5 milliGRAM(s) Oral daily  levothyroxine 137 MICROGram(s) Oral daily  metoclopramide Injectable 5 milliGRAM(s) IV Push every 6 hours PRN  pantoprazole    Tablet 40 milliGRAM(s) Oral two times a day  sodium bicarbonate 650 milliGRAM(s) Oral three times a day  sucralfate suspension 1 Gram(s) Oral four times a day  tamsulosin 0.4 milliGRAM(s) Oral at bedtime  traZODone 50 milliGRAM(s) Oral at bedtime  vitamin A &amp; D Ointment 1 Application(s) Topical at bedtime    albuterol    90 MICROgram(s) HFA Inhaler 2 Puff(s) Inhalation every 6 hours  buPROPion XL (24-Hour) . 150 milliGRAM(s) Oral daily  cefepime   IVPB      cefepime   IVPB 1000 milliGRAM(s) IV Intermittent every 12 hours  chlorhexidine 2% Cloths 1 Application(s) Topical <User Schedule>  divalproex  milliGRAM(s) Oral two times a day  dorzolamide 2% Ophthalmic Solution 1 Drop(s) Right EYE <User Schedule>  finasteride 5 milliGRAM(s) Oral daily  heparin   Injectable 5000 Unit(s) SubCutaneous every 8 hours  levothyroxine 137 MICROGram(s) Oral daily  metoclopramide Injectable 5 milliGRAM(s) IV Push every 6 hours PRN  pantoprazole    Tablet 40 milliGRAM(s) Oral two times a day  sodium bicarbonate 650 milliGRAM(s) Oral three times a day  sucralfate suspension 1 Gram(s) Oral four times a day  tamsulosin 0.4 milliGRAM(s) Oral at bedtime  traZODone 50 milliGRAM(s) Oral at bedtime  vitamin A &amp; D Ointment 1 Application(s) Topical at bedtime    Drug Dosing Weight  Height (cm): 172.7 (28 May 2023 19:50)  Weight (kg): 83.9 (28 May 2023 19:50)  BMI (kg/m2): 28.1 (28 May 2023 19:50)  BSA (m2): 1.98 (28 May 2023 19:50)    CENTRAL LINE: [ ] YES [x ] NO  LOCATION:   DATE INSERTED:  REMOVE: [ ] YES [ ] NO  EXPLAIN:    HATFIELD: [ ] YES [x ] NO    DATE INSERTED:  REMOVE:  [ ] YES [ ] NO  EXPLAIN:    PAST MEDICAL & SURGICAL HISTORY:  Hypothyroid      Hyperlipidemia      Ambulatory dysfunction      Anemia      History of BPH      CKD (chronic kidney disease)      Chronic obstructive pulmonary disease (COPD)      Bipolar disorder      HLD (hyperlipidemia)      BPH (benign prostatic hyperplasia)      Chronic diastolic congestive heart failure      Lymphedema      Chronic leg pain      No significant past surgical history                        PHYSICAL EXAM:    PHYSICAL EXAM:  GENERAL: NAD, well-groomed, well-developed  HEAD:  Atraumatic, Normocephalic  EYES: EOMI, PERRLA, conjunctiva and sclera clear  ENMT:  Moist mucous membranes  NECK: Supple  NERVOUS SYSTEM:  Alert & Oriented X3. Moves all exts. spontaneously.   CHEST/LUNG: Decreased at the bases bilaterally; No rales, rhonchi, wheezing, or rubs. On RA.   HEART: Regular rate and rhythm; No murmurs, rubs, or gallops  ABDOMEN: Soft, Nontender, Nondistended; Bowel sounds present  EXTREMITIES:  1+ Peripheral Pulses, No clubbing, cyanosis, + trace pitting  edema lower exts.   SKIN: Discoloration of lower exts. Stage 1 Pressure Injury to the Bilateral Heels, Bilateral Gluteus, and Coccyx areas;   Stage 2 Pressure Injury to the R. Gluteu.         LABS:  CBC Full  -  ( 04 Jun 2023 07:28 )  WBC Count : 7.67 K/uL  RBC Count : 2.85 M/uL  Hemoglobin : 8.9 g/dL  Hematocrit : 30.5 %  Platelet Count - Automated : 205 K/uL  Mean Cell Volume : 107.0 fl  Mean Cell Hemoglobin : 31.2 pg  Mean Cell Hemoglobin Concentration : 29.2 gm/dL  Auto Neutrophil # : x  Auto Lymphocyte # : x  Auto Monocyte # : x  Auto Eosinophil # : x  Auto Basophil # : x  Auto Neutrophil % : x  Auto Lymphocyte % : x  Auto Monocyte % : x  Auto Eosinophil % : x  Auto Basophil % : x    06-04    136  |  116<H>  |  63<H>  ----------------------------<  99  3.4<L>   |  14<L>  |  3.44<H>    Ca    9.7      04 Jun 2023 07:28  Phos  2.6     06-04  Mg     2.1     06-04    TPro  5.3<L>  /  Alb  1.6<L>  /  TBili  0.2  /  DBili  x   /  AST  <3<L>  /  ALT  <6<L>  /  AlkPhos  92  06-04              [  ]  DVT Prophylaxis  [  ]  Nutrition, Brand, Rate         Goal Rate        Abnormal Nutritional Status -  Malnutrition   Cachexia      Morbid Obesity BMI >/=40    RADIOLOGY & ADDITIONAL STUDIES:  ***    Goals of Care Discussion with Family/Proxy/Other   - see note from/family meeting set up for...     GAYLA PEARCE    SCU progress note    INTERVAL HPI/OVERNIGHT EVENTS: *** no overnight events    DNR [ ]   DNI  [  ] Full Code    Covid - 19 PCR: 5/28/23 negaitve    The 4Ms    What Matters Most: see GOC  Age appropriate Medications/Screen for High Risk Medication: Yes  Mentation: see CAM below  Mobility: defer to physical exam    The Confusion Assessment Method (CAM) Diagnostic Algorithm     1: Acute Onset or Fluctuating Course  - Is there evidence of an acute change in mental status from the patient’s baseline? Did the (abnormal) behavior  fluctuate during the day, that is, tend to come and go, or increase and decrease in severity?  [ ] YES [x] NO     2: Inattention  - Did the patient have difficulty focusing attention, being easily distractible, or having difficulty keeping track of what was being said?  [ ] YES [ x] NO     3: Disorganized thinking  -Was the patient’s thinking disorganized or incoherent, such as rambling or irrelevant conversation, unclear or illogical flow of ideas, or unpredictable switching from subject to subject?  [ ] YES [x ] NO    4: Altered Level of consciousness?  [ ] YES [x ] NO    The diagnosis of delirium by CAM requires the presence of features 1 and 2 and either 3 or 4.    PRESSORS: [ ] YES [ ] NO  cefepime   IVPB      cefepime   IVPB 1000 milliGRAM(s) IV Intermittent every 12 hours    Cardiovascular:  Heart Failure  Acute   Acute on Chronic  Chronic         Pulmonary:  albuterol    90 MICROgram(s) HFA Inhaler 2 Puff(s) Inhalation every 6 hours    Hematalogic:  heparin   Injectable 5000 Unit(s) SubCutaneous every 8 hours    Other:  buPROPion XL (24-Hour) . 150 milliGRAM(s) Oral daily  chlorhexidine 2% Cloths 1 Application(s) Topical <User Schedule>  divalproex  milliGRAM(s) Oral two times a day  dorzolamide 2% Ophthalmic Solution 1 Drop(s) Right EYE <User Schedule>  finasteride 5 milliGRAM(s) Oral daily  levothyroxine 137 MICROGram(s) Oral daily  metoclopramide Injectable 5 milliGRAM(s) IV Push every 6 hours PRN  pantoprazole    Tablet 40 milliGRAM(s) Oral two times a day  sodium bicarbonate 650 milliGRAM(s) Oral three times a day  sucralfate suspension 1 Gram(s) Oral four times a day  tamsulosin 0.4 milliGRAM(s) Oral at bedtime  traZODone 50 milliGRAM(s) Oral at bedtime  vitamin A &amp; D Ointment 1 Application(s) Topical at bedtime    albuterol    90 MICROgram(s) HFA Inhaler 2 Puff(s) Inhalation every 6 hours  buPROPion XL (24-Hour) . 150 milliGRAM(s) Oral daily  cefepime   IVPB      cefepime   IVPB 1000 milliGRAM(s) IV Intermittent every 12 hours  chlorhexidine 2% Cloths 1 Application(s) Topical <User Schedule>  divalproex  milliGRAM(s) Oral two times a day  dorzolamide 2% Ophthalmic Solution 1 Drop(s) Right EYE <User Schedule>  finasteride 5 milliGRAM(s) Oral daily  heparin   Injectable 5000 Unit(s) SubCutaneous every 8 hours  levothyroxine 137 MICROGram(s) Oral daily  metoclopramide Injectable 5 milliGRAM(s) IV Push every 6 hours PRN  pantoprazole    Tablet 40 milliGRAM(s) Oral two times a day  sodium bicarbonate 650 milliGRAM(s) Oral three times a day  sucralfate suspension 1 Gram(s) Oral four times a day  tamsulosin 0.4 milliGRAM(s) Oral at bedtime  traZODone 50 milliGRAM(s) Oral at bedtime  vitamin A &amp; D Ointment 1 Application(s) Topical at bedtime    Drug Dosing Weight  Height (cm): 172.7 (28 May 2023 19:50)  Weight (kg): 83.9 (28 May 2023 19:50)  BMI (kg/m2): 28.1 (28 May 2023 19:50)  BSA (m2): 1.98 (28 May 2023 19:50)    CENTRAL LINE: [ ] YES [x ] NO  LOCATION:   DATE INSERTED:  REMOVE: [ ] YES [ ] NO  EXPLAIN:    HATFIELD: [ ] YES [x ] NO    DATE INSERTED:  REMOVE:  [ ] YES [ ] NO  EXPLAIN:    PAST MEDICAL & SURGICAL HISTORY:  Hypothyroid      Hyperlipidemia      Ambulatory dysfunction      Anemia      History of BPH      CKD (chronic kidney disease)      Chronic obstructive pulmonary disease (COPD)      Bipolar disorder      HLD (hyperlipidemia)      BPH (benign prostatic hyperplasia)      Chronic diastolic congestive heart failure      Lymphedema      Chronic leg pain      No significant past surgical history      PHYSICAL EXAM:    PHYSICAL EXAM:  GENERAL: NAD, well-groomed, well-developed  HEAD:  Atraumatic, Normocephalic  EYES: EOMI, PERRLA, conjunctiva and sclera clear  ENMT:  Moist mucous membranes  NECK: Supple  NERVOUS SYSTEM:  Alert & Oriented X3. Moves all exts. spontaneously.   CHEST/LUNG: Decreased at the bases bilaterally; No rales, rhonchi, wheezing, or rubs. On RA.   HEART: Regular rate and rhythm; No murmurs, rubs, or gallops  ABDOMEN: Soft, Nontender, Nondistended; Bowel sounds present  EXTREMITIES:  1+ Peripheral Pulses, No clubbing, cyanosis, + trace pitting  edema lower exts.   SKIN: Discoloration of lower exts. Stage 1 Pressure Injury to the Bilateral Heels, Bilateral Gluteus, and Coccyx areas;   Stage 2 Pressure Injury to the R. Gluteus         LABS:  CBC Full  -  ( 04 Jun 2023 07:28 )  WBC Count : 7.67 K/uL  RBC Count : 2.85 M/uL  Hemoglobin : 8.9 g/dL  Hematocrit : 30.5 %  Platelet Count - Automated : 205 K/uL  Mean Cell Volume : 107.0 fl  Mean Cell Hemoglobin : 31.2 pg  Mean Cell Hemoglobin Concentration : 29.2 gm/dL  Auto Neutrophil # : x  Auto Lymphocyte # : x  Auto Monocyte # : x  Auto Eosinophil # : x  Auto Basophil # : x  Auto Neutrophil % : x  Auto Lymphocyte % : x  Auto Monocyte % : x  Auto Eosinophil % : x  Auto Basophil % : x    06-04    136  |  116<H>  |  63<H>  ----------------------------<  99  3.4<L>   |  14<L>  |  3.44<H>    Ca    9.7      04 Jun 2023 07:28  Phos  2.6     06-04  Mg     2.1     06-04    TPro  5.3<L>  /  Alb  1.6<L>  /  TBili  0.2  /  DBili  x   /  AST  <3<L>  /  ALT  <6<L>  /  AlkPhos  92  06-04      [  ]  DVT Prophylaxis  [  ]  Nutrition, Brand, Rate         Goal Rate        Abnormal Nutritional Status -  Malnutrition   Cachexia         RADIOLOGY & ADDITIONAL STUDIES:  ***  < from: CT Abdomen and Pelvis No Cont (05.28.23 @ 22:34) >    FINDINGS:  LOWER CHEST: Bibasilar subsegmental atelectasis. Circumferential wall   thickening of the distal esophagus.    LIVER: Within normal limits.  BILE DUCTS: Normal caliber.  GALLBLADDER: Cholecystectomy.  SPLEEN: Within normal limits.  PANCREAS: Within normal limits.  ADRENALS: Within normal limits.  KIDNEYS/URETERS: Redemonstrated moderate nonspecific bilateral   perinephric fat stranding. No hydronephrosis.    BLADDER: Collapsed around a [place] catheter balloon. Intravesicular gas   secondary to instrumentation. Circumferential wall thickening.  REPRODUCTIVE ORGANS: Prostate within normal limits.    BOWEL: No bowel obstruction or inflammation. Scattered colonic   diverticulosis without diverticulitis. Appendix is normal.  PERITONEUM: No ascites.  VESSELS: Infrarenal IVC filter.  RETROPERITONEUM/LYMPH NODES: No lymphadenopathy.  ABDOMINAL WALL: Small bilateral fat-containing inguinal hernias.  BONES: Old right rib fractures. Severe right hip degenerative change with   remodeling of the right femoral head. Degenerative changes of the spine.    IMPRESSION:  1. No bowel obstruction or inflammation.  2. Circumferential wall thickening of the distal esophagus which could be   due to an esophagitis.  3. Circumferential urinary bladder wall thickening which could be due to   underdistention versus a cystitis. Correlate with urinalysis.        --- End of Report ---    < end of copied text >  < from: CT Head No Cont (05.28.23 @ 22:33) >    FINDINGS:    PARENCHYMA AND VENTRICLES: No acute intracranial hemorrhage, mass effect,   or midline shift. No hydrocephalus. Prominence of the sulci and   ventricles, consistent with age-related parenchymal volume loss; the   extent of ventricular and sulcal prominence appears somewhat increased   compared to 12/4/2022. Small vessel white matter changes.  EXTRA-AXIAL: No abnormal extraaxial collection.  PARANASAL SINUSES: Layering fluid in the right sphenoid sinus and   additional mild scattered mucosal thickening, as on prior.  TYMPANOMASTOID CAVITIES: Within normal limits.  ORBITS: Bilateral cataract surgery.  CALVARIUM: Within normal limits.  MISCELLANEOUS: Intracranial atherosclerosis.    IMPRESSION:  No acute intracranial hemorrhage, mass effect, or midline shift.  Paranasal sinus mucosal disease.  The extent of ventricular and sulcal prominence appears increased   compared with 12/4/2022, indeterminate clinical significance.    --- End of Report ---    < end of copied text >  < from: Transthoracic Echocardiogram (05.29.23 @ 06:27) >  PROCEDURE: Transthoracic echocardiogram with 2-D, M-Mode  and complete spectral and color flow Doppler.  INDICATION: Cardiogenic shock (R57.0)  HISTORY:  ------------------------------------------------------------------------  DIMENSIONS:  Dimensions:     Normal Values:  LA:     4.2 cm    2.0 - 4.0 cm  Ao:     4.1 cm    2.0 - 3.8 cm  SEPTUM: 1.2 cm    0.6 - 1.2cm  PWT:    1.1 cm    0.6 - 1.1 cm  LVIDd:  5.0 cm    3.0 - 5.6 cm  LVIDs:  3.6 cm    1.8 - 4.0 cm      Derived Variables:  LVMI: 111 g/m2  RWT: 0.44  Ejection Fraction Visual Estimate: >55 %    ------------------------------------------------------------------------  OBSERVATIONS:  Mitral Valve: Mitral annular calcification. Trace mitral  regurgitation.  Aortic Root: Normal aortic root.  Aortic Valve: Aortic valve leaflet morphology not well  visualized, opens normally.  Left Atrium: Normal leftatrium.  LA volume index = 28  cc/m2.  Left Ventricle: Endocardium not well visualized; grossly  normal left ventricular systolic function. Mild concentric  left ventricular hypertrophy. Grade I diastolic dysfunction  (Impaired relaxation, mild).  Right Heart: Normal right atrium. Normal right ventricular  function.   Off axis images preclude accurate assessment of  right ventricular size. Tricuspid valve not well seen.  There is trace pulmonic regurgitation.  Pericardium/PleuraNormal pericardiumwith no pericardial  effusion.  Hemodynamic: Incomplete tricuspid regurgitation jet  precludes accurate assessment of pulmonary artery systolic  pressure.  ------------------------------------------------------------------------  CONCLUSIONS:  1. Mitral annular calcification. Trace mitral  regurgitation.  2. Mild concentric left ventricular hypertrophy.  3. Endocardium not well visualized; grossly normal left  ventricular systolic function.  4. Grade I diastolic dysfunction (Impaired relaxation,  mild).  5. Normal right ventricular function.   Off axis images  preclude accurate assessment of right ventricular size.    ------------------------------------------------------------------------  Confirmed on  5/29/2023 - 21:25:02 by Austen Javier MD  ------------------------------------------------------------------------    < end of copied text >    Goals of Care Discussion with Family/Proxy/Other   - see note from/family meeting set up for...

## 2023-06-04 NOTE — PROGRESS NOTE ADULT - ASSESSMENT
Patient is a 63 year old Male, from [place], AOx3, ambulates with walker , chronic FC, h/o obesity, HTN, HLD, PVD, Seizures, CKD4 (base SCr 3.8), anemia, BPH, Lymphedema, hypothyroidism, HFpEF, possible COPD, decubitus ulcer (sacrum/gluteal), polyneuropathy, polyarthritis, bipolar disorder. Patient presented to ED for abdominal pain, nausea and vomiting brown emesis. Patient admitted to ICU for Hypotension likely from Sepsis secondary to complicated UTI and esophagitis. CT head was negative for acute changes. CT abd showed circumferential wall thickening of distal esophagus. GI consulted recommending endoscopy which took place on 5/30 with findings of ulcerative esophagitis. On PPI.   Blood cultures from 5/28 + for P. aeruginasa. Ucx likely contaminant. On cefepime.  Patient with pressure injury. Wound care consult. Nephrology following for SHEYLA on CKD.   06/01: Repeat blood cx pending. Advance diet as tolerated. PT reccs TG (5/30). Plan to return to [place] once repeat Bcx come back. C/w Cefepime for complicated UTI and P. Aeruginosa bacteriemia. Afebrile. BP soft this AM.    06/02: Repeat blood cx pending. On Cefepime. PT reccs TG. Dispo ([place]). Hemodynamically stable. Plan for OOB to chair.   06/03: Repeat Bcx from 6/1 NGTD. Afebrile. On Cefepime. ID following. Hemodynamically stable. Plan to advance diet to full liquids.   6/4: Patient with tachycardia secondary to frustration from not being able to be discharge. Emotional support given, encouraged to verbalize concerns, explained in full the plan of care. Patient VS became stable, will hold off on EKG as isolated event.

## 2023-06-04 NOTE — PROGRESS NOTE ADULT - ASSESSMENT
SHEYLA due to Hypovolemia.  Stable at present.  - Follow up BMP.    CKD  4  -Renal diet.  - Avoid Nephrotoxins.  - follow up BMP.    CKD Mineral and Bone Disease:  -Phosphorus is okay without binders; low phos and phos supplemented.  PTH is low and no need vitamin d analog.    Anemia of CKD:  - Transfuse as needed for Hg < 7.    Metabolic Acidosis:  Non AG, likely Distal RTA.  - Increase NaHCO3 to 1300 mg PO TID.    Hypokalemia:  Likely Distal RTA.  - Monitor K and Mg.  - Supplement as needed.

## 2023-06-04 NOTE — PROGRESS NOTE ADULT - PROBLEM SELECTOR PLAN 6
Continue valproic acid 500 mg BID.   Valproic acid level less than 40 (Valproic acid for Bipolar disorder).   Maintain seizure precautions.  No s/s of seizures.

## 2023-06-04 NOTE — PROGRESS NOTE ADULT - SUBJECTIVE AND OBJECTIVE BOX
Patient is a 63y old  Male who presents with a chief complaint of sepsis (04 Jun 2023 11:48)    PATIENT IS SEEN AND EXAMINED IN MEDICAL FLOOR.    SOWMYA [    ]    ALYSIA [   ]      GT [   ]    ALLERGIES:  No Known Allergies      Daily     Daily     VITALS:    Vital Signs Last 24 Hrs  T(C): 36.6 (04 Jun 2023 11:59), Max: 36.6 (04 Jun 2023 11:59)  T(F): 97.8 (04 Jun 2023 11:59), Max: 97.8 (04 Jun 2023 11:59)  HR: 91 (04 Jun 2023 12:15) (65 - 125)  BP: 108/59 (04 Jun 2023 12:15) (92/74 - 152/118)  BP(mean): 87 (04 Jun 2023 12:15) (87 - 87)  RR: 20 (04 Jun 2023 12:15) (18 - 20)  SpO2: 100% (04 Jun 2023 12:15) (96% - 100%)    Parameters below as of 04 Jun 2023 12:15  Patient On (Oxygen Delivery Method): room air        LABS:    CBC Full  -  ( 04 Jun 2023 07:28 )  WBC Count : 7.67 K/uL  RBC Count : 2.85 M/uL  Hemoglobin : 8.9 g/dL  Hematocrit : 30.5 %  Platelet Count - Automated : 205 K/uL  Mean Cell Volume : 107.0 fl  Mean Cell Hemoglobin : 31.2 pg  Mean Cell Hemoglobin Concentration : 29.2 gm/dL  Auto Neutrophil # : x  Auto Lymphocyte # : x  Auto Monocyte # : x  Auto Eosinophil # : x  Auto Basophil # : x  Auto Neutrophil % : x  Auto Lymphocyte % : x  Auto Monocyte % : x  Auto Eosinophil % : x  Auto Basophil % : x      06-04    136  |  116<H>  |  63<H>  ----------------------------<  99  3.4<L>   |  14<L>  |  3.44<H>    Ca    9.7      04 Jun 2023 07:28  Phos  2.6     06-04  Mg     2.1     06-04    TPro  5.3<L>  /  Alb  1.6<L>  /  TBili  0.2  /  DBili  x   /  AST  <3<L>  /  ALT  <6<L>  /  AlkPhos  92  06-04    CAPILLARY BLOOD GLUCOSE            LIVER FUNCTIONS - ( 04 Jun 2023 07:28 )  Alb: 1.6 g/dL / Pro: 5.3 g/dL / ALK PHOS: 92 U/L / ALT: <6 U/L DA / AST: <3 U/L / GGT: x           Creatinine Trend: 3.44<--, 3.17<--, 3.11<--, 3.18<--, 3.52<--, 3.81<--  I&O's Summary          .Blood Blood  06-01 @ 10:52   No growth to date.  --  --      .Blood Blood  06-01 @ 10:46   No growth to date.  --  --      .Blood Blood-Peripheral  05-28 @ 21:09   No Growth Final  --  Blood Culture PCR  Pseudomonas aeruginosa      Clean Catch Clean Catch (Midstream)  05-28 @ 21:02   >=3 organisms. Probable collection contamination.  --  --          MEDICATIONS:    MEDICATIONS  (STANDING):  albuterol    90 MICROgram(s) HFA Inhaler 2 Puff(s) Inhalation every 6 hours  buPROPion XL (24-Hour) . 150 milliGRAM(s) Oral daily  cefepime   IVPB      cefepime   IVPB 1000 milliGRAM(s) IV Intermittent every 12 hours  chlorhexidine 2% Cloths 1 Application(s) Topical <User Schedule>  divalproex  milliGRAM(s) Oral two times a day  dorzolamide 2% Ophthalmic Solution 1 Drop(s) Right EYE <User Schedule>  finasteride 5 milliGRAM(s) Oral daily  heparin   Injectable 5000 Unit(s) SubCutaneous every 8 hours  levothyroxine 137 MICROGram(s) Oral daily  pantoprazole    Tablet 40 milliGRAM(s) Oral two times a day  sodium bicarbonate 650 milliGRAM(s) Oral three times a day  sucralfate suspension 1 Gram(s) Oral four times a day  tamsulosin 0.4 milliGRAM(s) Oral at bedtime  traZODone 50 milliGRAM(s) Oral at bedtime  vitamin A &amp; D Ointment 1 Application(s) Topical at bedtime      MEDICATIONS  (PRN):  metoclopramide Injectable 5 milliGRAM(s) IV Push every 6 hours PRN nausea/vomit        REVIEW OF SYSTEMS:                           ALL ROS DONE [ X   ]      CONSTITUTIONAL:  LETHARGIC [   ], FEVER [   ], UNRESPONSIVE [   ]  CVS:  CP  [   ], SOB, [   ], PALPITATIONS [   ], DIZZYNESS [   ]  RS: COUGH [   ], SPUTUM [   ]  GI: ABDOMINAL PAIN [   ], NAUSEA [   ], VOMITINGS [   ], DIARRHEA [   ], CONSTIPATION [   ]  :  DYSURIA [   ], NOCTURIA [   ], INCREASED FREQUENCY [   ], DRIBLING [   ],  SKELETAL: PAINFUL JOINTS [   ], SWOLLEN JOINTS [   ], NECK ACHE [   ], LOW BACK ACHE [   ],  SKIN : ULCERS [   ], RASH [   ], ITCHING [   ]  CNS: HEAD ACHE [   ], DOUBLE VISION [   ], BLURRED VISION [   ], AMS / CONFUSION [   ], SEIZURES [   ], WEAKNESS [   ],TINGLING / NUMBNESS [   ]        PHYSICAL EXAMINATION:    GENERAL APPEARANCE: NO DISTRESS  HEENT:  NO PALLOR, NO  JVD,  NO   NODES, NECK SUPPLE  CVS: S1 +, S2 +,   RS: AEEB,  OCCASIONAL  RALES +,   NO RONCHI  ABD: SOFT, NT, NO, BS +  EXT: PE+  SKIN: WARM,   SKELETAL:  ROM REDUCED AT CERVICAL & LS SPINE  CNS:  AAO X 2-3          RADIOLOGY :    RADIOLOGY AND READINGS REVIEWED            ASSESSMENT :       PLAN:  HPI:  62 y/o M, A&Ox3, bedbound, chronic daley catheter with CKD (baseline creat 3.8), COPD, CHFpEF, BPH, PVD, Lymphedema, decubitus ulcers (Sacrum/gluteal) , Anemia, poly-neuropathy, poly-arthritis, hypothyroidism, seizures and Bipolar disorder was BIB EMS from [place] for abdominal pain, [place]. Mr. Rhoades states that he developed acute onset sharp abdominal pain 4 days ago a/w "brown" emesis (witnessed by EMS). Unable to tolerate food/drink x 4 days. Having normal bowel movements, last this morning 5/28. Denies any fevers. NH paperwork states concern for obstruction.  Patient endorses BLOODY EMESIS x 4 days upon further questioning. (29 May 2023 01:36)      # DC PLAN BACK TO [facility] ON ORAL CIPROFLOXACIN 250 MG BID ( RENAL DOSE ) TO COMPLETE TOTAL 2 WEEKS     # RESOLVING SEPSIS S/T P.AERUGINOSA BACTEREMIA + COMPLICATED UTI    - ON CEFEPIME, F/U BCX AND UCX  - ID CONSULT  - CRITICAL CARE EVALUATION IN PROGRESS      # RESOLVING GI BLEED  # ESOPHAGITIS  # ANEMIA OF CHRONIC DISEASE - MONITOR HGB, TRANSFUSION THRESHOLD HGB < 8  - S/P EGD - ULCERATIVE ESOPHAGITIS  - PPI BID  - CARAFATE QID  - ADVANCING DIET PER GI RECOMMENDATION   - GI CONSULT        # ANEMIA DUE TO GI BLEED, ANEMIA OF CKD     # CHRONIC HYPOXIC RESPIRATORY FAILURE S/T UNDERLYING COPD  - ON PRN O2  - CRITICAL CARE EVALUATION IN PROGRESS      # AMS DUE TO ACUTE METABOLIC ENCEPHALOPATHY      # SHEYLA ON CKD STAGE 4   # CHRONIC DALEY, BPH  - DALEY   - STRICT IS AND OS  - AVOID NEPHROTOXIC AGENTS  - MONITOR CR  - NEPHROLOGY CONSULT        # AMBULATORY DYSFUNCTION, IMPAIRED GAIT DUE TO GENERALIZED MUSCLE WEAKNESS, CERVICAL & LS SPINAL STENOSIS, POLYARTRHITIS & PERIPHERAL NEUROPATHY. OP  - FALL PRECAUTIONS    # DYSPHAGIA DUE TO METABOLIC ENCEPHALOPATHY - ON MODIFIED DIET PER SWALLOW EVAL      # HX OF SEIZURE DX  - ON VALPROIC ACID    # HYPOTHYROIDISM - ON LEVOTHYROXINE    # PRESSURE ULCERS  - PATIENT IS HIGH RISK FOR PRESSURE ULCERS DESPITE PREVENTIVE MEASURES IN PLACE  - WOUND CARE CONSULT    # GI PPX    DR. ALDEN STORY

## 2023-06-04 NOTE — PROGRESS NOTE ADULT - PROBLEM SELECTOR PLAN 8
SHEYLA on CKD  stage 4. likely pre-renal- resolved SHEYLA  BP goal normotensive. Avoid hypotension.   Baseline around 3.7  Avoid nephrotoxic agents.  continue bicarb

## 2023-06-04 NOTE — PROGRESS NOTE ADULT - PROBLEM SELECTOR PLAN 9
Continue finasteride and tamsulosin.    #Urinary retention  Continue Bladder scan Q 8 hrs  Condom cath in place.

## 2023-06-04 NOTE — PROGRESS NOTE ADULT - SUBJECTIVE AND OBJECTIVE BOX
GAYLA PEARCE  63y  Patient is a 63y old  Male who presents with a chief complaint of sepsis (04 Jun 2023 13:59)    HPI:  Seen and examined.  Followed for CKD 4 after admitted for GIB.    HEALTH ISSUES - PROBLEM Dx:  GIB (gastrointestinal bleeding)    Sepsis    Anemia    Hypothyroid    CKD (chronic kidney disease)    BPH (benign prostatic hyperplasia)    Bipolar disorder    Advance care planning    Seizure    Esophagitis    Hypokalemia    Ambulatory dysfunction    Prophylactic measure          MEDICATIONS  (STANDING):  albuterol    90 MICROgram(s) HFA Inhaler 2 Puff(s) Inhalation every 6 hours  buPROPion XL (24-Hour) . 150 milliGRAM(s) Oral daily  cefepime   IVPB      cefepime   IVPB 1000 milliGRAM(s) IV Intermittent every 12 hours  chlorhexidine 2% Cloths 1 Application(s) Topical <User Schedule>  divalproex  milliGRAM(s) Oral two times a day  dorzolamide 2% Ophthalmic Solution 1 Drop(s) Right EYE <User Schedule>  finasteride 5 milliGRAM(s) Oral daily  heparin   Injectable 5000 Unit(s) SubCutaneous every 8 hours  levothyroxine 137 MICROGram(s) Oral daily  pantoprazole    Tablet 40 milliGRAM(s) Oral two times a day  sodium bicarbonate 650 milliGRAM(s) Oral three times a day  sucralfate suspension 1 Gram(s) Oral four times a day  tamsulosin 0.4 milliGRAM(s) Oral at bedtime  traZODone 50 milliGRAM(s) Oral at bedtime  vitamin A &amp; D Ointment 1 Application(s) Topical at bedtime    MEDICATIONS  (PRN):  metoclopramide Injectable 5 milliGRAM(s) IV Push every 6 hours PRN nausea/vomit    Vital Signs Last 24 Hrs  T(C): 36.6 (04 Jun 2023 11:59), Max: 36.6 (04 Jun 2023 11:59)  T(F): 97.8 (04 Jun 2023 11:59), Max: 97.8 (04 Jun 2023 11:59)  HR: 91 (04 Jun 2023 12:15) (65 - 125)  BP: 108/59 (04 Jun 2023 12:15) (92/74 - 152/118)  BP(mean): 87 (04 Jun 2023 12:15) (87 - 87)  RR: 20 (04 Jun 2023 12:15) (18 - 20)  SpO2: 100% (04 Jun 2023 12:15) (96% - 100%)    Parameters below as of 04 Jun 2023 12:15  Patient On (Oxygen Delivery Method): room air      Daily     Daily     PHYSICAL EXAM:  Constitutional:  He appears comfortable and not distressed. Not diaphoretic.    Neck:  The thyroid is normal. Trachea is midline.     Respiratory: The lungs are clear to auscultation. No dullness and expansion is normal.    Cardiovascular: S1 and S2 are normal. No murmurs, rubs or gallops are present.    Gastrointestinal: The abdomen is soft. No tenderness is present. No masses are present. Bowel sounds are normal.    Genitourinary: The bladder is not distended. No CVA tenderness is present.    Extremities: No edema is noted. No deformities are present.    Neurological: Cognition is normal. Tone, power and sensation are normal.     Skin: No lesions are seen  or palpated.    Lymph Nodes: No lymphadenopathy is present.    Psychiatric: Mood is appropriate. No hallucinations or flight of ideas are noted.                              8.9    7.67  )-----------( 205      ( 04 Jun 2023 07:28 )             30.5     06-04    136  |  116<H>  |  63<H>  ----------------------------<  99  3.4<L>   |  14<L>  |  3.44<H>    Ca    9.7      04 Jun 2023 07:28  Phos  2.6     06-04  Mg     2.1     06-04    TPro  5.3<L>  /  Alb  1.6<L>  /  TBili  0.2  /  DBili  x   /  AST  <3<L>  /  ALT  <6<L>  /  AlkPhos  92  06-04

## 2023-06-05 NOTE — PROGRESS NOTE ADULT - ASSESSMENT
63 year old Male, from [place], AOx3, ambulates with walker , chronic FC, h/o obesity, HTN, HLD, PVD, Seizures, CKD4 (base SCr 3.8), anemia, BPH, Lymphedema, hypothyroidism, HFpEF, possible COPD, decubitus ulcer (sacrum/gluteal), polyneuropathy, polyarthritis, bipolar disorder. Patient presented to ED for abdominal pain, nausea and vomiting brown emesis. Patient admitted to ICU for Hypotension likely from Sepsis secondary to complicated UTI and esophagitis. CT head was negative for acute changes. CT abd showed circumferential wall thickening of distal esophagus. GI consulted recommending endoscopy which took place on 5/30 with findings of ulcerative esophagitis. On PPI.   Blood cultures from 5/28 + for P. aeruginasa. Ucx likely contaminant. On cefepime.  Patient with pressure injury. Wound care consult. Nephrology following for SHEYLA on CKD.   06/01: Repeat blood cx pending. Advance diet as tolerated. PT reccs TG (5/30). Plan to return to [facility] once repeat Bcx come back. C/w Cefepime for complicated UTI and P. Aeruginosa bacteriemia. Afebrile. BP soft this AM.    06/02: Repeat blood cx pending. On Cefepime. PT reccs TG. Dispo ([place]). Hemodynamically stable. Plan for OOB to chair.   06/03: Repeat Bcx from 6/1 NGTD. Afebrile. On Cefepime. ID following. Hemodynamically stable. Plan to advance diet to full liquids.   6/4: Patient with tachycardia secondary to frustration from not being able to be discharge. Emotional support given, encouraged to verbalize concerns, explained in full the plan of care. Patient VS became stable, will hold off on EKG as isolated event.

## 2023-06-05 NOTE — PROGRESS NOTE ADULT - PROBLEM SELECTOR PLAN 8
SHEYLA on CKD  stage 4. likely pre-renal- resolved SHEYLA  BP goal normotensive. Avoid hypotension.   Baseline around 3.7  Avoid nephrotoxic agents.  continue bicarb.

## 2023-06-05 NOTE — PROGRESS NOTE ADULT - PROBLEM SELECTOR PLAN 10
DVT and GI prophylaxis.  PT recs TG   CM aware   Continue attempts to get patient out of bed.  Medically stable for discharge to [facility]

## 2023-06-05 NOTE — PROGRESS NOTE ADULT - SUBJECTIVE AND OBJECTIVE BOX
Time of Visit:  Patient seen and examined.     MEDICATIONS  (STANDING):  albuterol    90 MICROgram(s) HFA Inhaler 2 Puff(s) Inhalation every 6 hours  buPROPion XL (24-Hour) . 150 milliGRAM(s) Oral daily  cefepime   IVPB      cefepime   IVPB 1000 milliGRAM(s) IV Intermittent every 12 hours  chlorhexidine 2% Cloths 1 Application(s) Topical <User Schedule>  divalproex  milliGRAM(s) Oral two times a day  dorzolamide 2% Ophthalmic Solution 1 Drop(s) Right EYE <User Schedule>  finasteride 5 milliGRAM(s) Oral daily  heparin   Injectable 5000 Unit(s) SubCutaneous every 8 hours  levothyroxine 137 MICROGram(s) Oral daily  pantoprazole    Tablet 40 milliGRAM(s) Oral two times a day  sodium bicarbonate 1300 milliGRAM(s) Oral three times a day  sucralfate suspension 1 Gram(s) Oral four times a day  tamsulosin 0.4 milliGRAM(s) Oral at bedtime  traZODone 50 milliGRAM(s) Oral at bedtime  vitamin A &amp; D Ointment 1 Application(s) Topical at bedtime      MEDICATIONS  (PRN):  metoclopramide Injectable 5 milliGRAM(s) IV Push every 6 hours PRN nausea/vomit       Medications up to date at time of exam.      PHYSICAL EXAMINATION:    Vital Signs Last 24 Hrs  T(C): 36.6 (05 Jun 2023 13:58), Max: 36.6 (05 Jun 2023 13:58)  T(F): 97.8 (05 Jun 2023 13:58), Max: 97.8 (05 Jun 2023 13:58)  HR: 98 (05 Jun 2023 13:58) (87 - 98)  BP: 96/68 (05 Jun 2023 13:58) (95/60 - 140/95)  BP(mean): --  RR: 18 (05 Jun 2023 13:58) (18 - 20)  SpO2: 99% (05 Jun 2023 13:58) (99% - 100%)    Parameters below as of 05 Jun 2023 13:58  Patient On (Oxygen Delivery Method): room air       (if applicable)    General : Alert and oriented . Able to answer question at rest with no SOB. No acute distress .     HEENT: Head is normocephalic and atraumatic. No nasal tenderness . EOMI intact . Mucous membranes are moist.     NECK: Supple, no palpable adenopathy.    LUNGS: Clear to auscultation bilaterally with  no wheezing, rales, or rhonchi. No use of accessory muscle.     HEART: S1 S2 Regular rate and no click / rub.     ABDOMEN: Soft, nontender, and nondistended.  Active bowel sounds.     : No bladder distention and tenderness.     EXTREMITIES: Trace B/L LE edema . Bilateral LE Chronic Venous Stasis .     NEUROLOGIC: Awake, alert, oriented.       LABS:                        8.9    8.04  )-----------( 210      ( 05 Jun 2023 07:24 )             29.6     06-05    141  |  114<H>  |  72<H>  ----------------------------<  83  3.4<L>   |  17<L>  |  3.80<H>    Ca    10.1      05 Jun 2023 07:24  Phos  2.5     06-05  Mg     2.0     06-05    TPro  5.4<L>  /  Alb  1.5<L>  /  TBili  0.2  /  DBili  x   /  AST  6<L>  /  ALT  <6<L>  /  AlkPhos  95  06-05                        MICROBIOLOGY: (if applicable)    RADIOLOGY & ADDITIONAL STUDIES:  EKG:   CXR:  ECHO:    IMPRESSION: 63y Male PAST MEDICAL & SURGICAL HISTORY:  Hypothyroid      Hyperlipidemia      Ambulatory dysfunction      Anemia      History of BPH      CKD (chronic kidney disease)      Chronic obstructive pulmonary disease (COPD)      Bipolar disorder      HLD (hyperlipidemia)      BPH (benign prostatic hyperplasia)      Chronic diastolic congestive heart failure      Lymphedema      Chronic leg pain      No significant past surgical history    Impression: This is a 64 Y/O Male from [facility] presented to ED  abdominal pain, nausea and vomiting brown emesis. Patient admitted to ICU for Hypotension likely from Sepsis secondary to complicated UTI and esophagitis. CT head was negative for acute changes. CT abd showed circumferential wall thickening of distal esophagus. GI consulted recommending endoscopy for esophagitis. Has COPD but no exacerbation at this time. 06-01-23 Negative Blood Cx x2. 06-04-23 Negative PCR for Covid 19.      Suggestion:  O2 saturation 98% room air. So far saturating good room air.  Continue Albuterol 90 mcg 2 Puffs Q 6 Hours.   On Cefepime 1 gm IVPB Q 12 Hours .     PPI and carafate as per GI.  DVT prophylactic . On Heparin 5,000 Units SQ Q 8 Hours .

## 2023-06-05 NOTE — PROGRESS NOTE ADULT - PROBLEM SELECTOR PLAN 1
Secondary to Bacteremia and complicated UTI.  Blood culture positive for Gram negative rods/Pseudomonas  Ucx likely contaminant.   repeated blood cultures (6/1) NGTD  Continue Cefepime- discharge Cipro 250mgs po BID to complete a total of 14 days of all antibiotics( Maxipime and cipro )  Last fay 6/10.

## 2023-06-05 NOTE — PROGRESS NOTE ADULT - SUBJECTIVE AND OBJECTIVE BOX
Patient is a 63y old  Male who presents with a chief complaint of sepsis (05 Jun 2023 12:33)    PATIENT IS SEEN AND EXAMINED IN MEDICAL FLOOR.  DORIST [    ]    ALYSIA [   ]      GT [   ]    ALLERGIES:  No Known Allergies      Daily     Daily     VITALS:    Vital Signs Last 24 Hrs  T(C): 36.4 (05 Jun 2023 21:09), Max: 36.6 (05 Jun 2023 13:58)  T(F): 97.6 (05 Jun 2023 21:09), Max: 97.8 (05 Jun 2023 13:58)  HR: 98 (05 Jun 2023 21:09) (87 - 98)  BP: 116/53 (05 Jun 2023 21:09) (96/68 - 140/95)  BP(mean): --  RR: 20 (05 Jun 2023 21:09) (18 - 20)  SpO2: 98% (05 Jun 2023 21:09) (98% - 100%)    Parameters below as of 05 Jun 2023 21:09  Patient On (Oxygen Delivery Method): room air        LABS:    CBC Full  -  ( 05 Jun 2023 07:24 )  WBC Count : 8.04 K/uL  RBC Count : 2.93 M/uL  Hemoglobin : 8.9 g/dL  Hematocrit : 29.6 %  Platelet Count - Automated : 210 K/uL  Mean Cell Volume : 101.0 fl  Mean Cell Hemoglobin : 30.4 pg  Mean Cell Hemoglobin Concentration : 30.1 gm/dL  Auto Neutrophil # : x  Auto Lymphocyte # : x  Auto Monocyte # : x  Auto Eosinophil # : x  Auto Basophil # : x  Auto Neutrophil % : x  Auto Lymphocyte % : x  Auto Monocyte % : x  Auto Eosinophil % : x  Auto Basophil % : x      06-05    141  |  114<H>  |  72<H>  ----------------------------<  83  3.4<L>   |  17<L>  |  3.80<H>    Ca    10.1      05 Jun 2023 07:24  Phos  2.5     06-05  Mg     2.0     06-05    TPro  5.4<L>  /  Alb  1.5<L>  /  TBili  0.2  /  DBili  x   /  AST  6<L>  /  ALT  <6<L>  /  AlkPhos  95  06-05    CAPILLARY BLOOD GLUCOSE            LIVER FUNCTIONS - ( 05 Jun 2023 07:24 )  Alb: 1.5 g/dL / Pro: 5.4 g/dL / ALK PHOS: 95 U/L / ALT: <6 U/L DA / AST: 6 U/L / GGT: x           Creatinine Trend: 3.80<--, 3.44<--, 3.17<--, 3.11<--, 3.18<--, 3.52<--  I&O's Summary    04 Jun 2023 07:01  -  05 Jun 2023 07:00  --------------------------------------------------------  IN: 550 mL / OUT: 275 mL / NET: 275 mL    05 Jun 2023 07:01  -  05 Jun 2023 22:19  --------------------------------------------------------  IN: 100 mL / OUT: 0 mL / NET: 100 mL            .Blood Blood  06-01 @ 10:52   No growth to date.  --  --      .Blood Blood  06-01 @ 10:46   No growth to date.  --  --      .Blood Blood-Peripheral  05-28 @ 21:09   No Growth Final  --  Blood Culture PCR  Pseudomonas aeruginosa      Clean Catch Clean Catch (Midstream)  05-28 @ 21:02   >=3 organisms. Probable collection contamination.  --  --          MEDICATIONS:    MEDICATIONS  (STANDING):  albuterol    90 MICROgram(s) HFA Inhaler 2 Puff(s) Inhalation every 6 hours  buPROPion XL (24-Hour) . 150 milliGRAM(s) Oral daily  cefepime   IVPB      cefepime   IVPB 1000 milliGRAM(s) IV Intermittent every 12 hours  chlorhexidine 2% Cloths 1 Application(s) Topical <User Schedule>  divalproex  milliGRAM(s) Oral two times a day  dorzolamide 2% Ophthalmic Solution 1 Drop(s) Right EYE <User Schedule>  finasteride 5 milliGRAM(s) Oral daily  heparin   Injectable 5000 Unit(s) SubCutaneous every 8 hours  levothyroxine 137 MICROGram(s) Oral daily  pantoprazole    Tablet 40 milliGRAM(s) Oral two times a day  sodium bicarbonate 1300 milliGRAM(s) Oral three times a day  sucralfate suspension 1 Gram(s) Oral four times a day  tamsulosin 0.4 milliGRAM(s) Oral at bedtime  traZODone 50 milliGRAM(s) Oral at bedtime  vitamin A &amp; D Ointment 1 Application(s) Topical at bedtime      MEDICATIONS  (PRN):  metoclopramide Injectable 5 milliGRAM(s) IV Push every 6 hours PRN nausea/vomit      REVIEW OF SYSTEMS:                           ALL ROS DONE [ X   ]    CONSTITUTIONAL:  LETHARGIC [   ], FEVER [   ], UNRESPONSIVE [   ]  CVS:  CP  [   ], SOB, [   ], PALPITATIONS [   ], DIZZYNESS [   ]  RS: COUGH [   ], SPUTUM [   ]  GI: ABDOMINAL PAIN [   ], NAUSEA [   ], VOMITINGS [   ], DIARRHEA [   ], CONSTIPATION [   ]  :  DYSURIA [   ], NOCTURIA [   ], INCREASED FREQUENCY [   ], DRIBLING [   ],  SKELETAL: PAINFUL JOINTS [   ], SWOLLEN JOINTS [   ], NECK ACHE [   ], LOW BACK ACHE [   ],  SKIN : ULCERS [   ], RASH [   ], ITCHING [   ]  CNS: HEAD ACHE [   ], DOUBLE VISION [   ], BLURRED VISION [   ], AMS / CONFUSION [   ], SEIZURES [   ], WEAKNESS [   ],TINGLING / NUMBNESS [   ]    PHYSICAL EXAMINATION:    GENERAL APPEARANCE: NO DISTRESS  HEENT:  NO PALLOR, NO  JVD,  NO   NODES, NECK SUPPLE  CVS: S1 +, S2 +,   RS: AEEB,  OCCASIONAL  RALES +,   NO RONCHI  ABD: SOFT, NT, NO, BS +  EXT: PE+  SKIN: WARM,   SKELETAL:  ROM REDUCED AT CERVICAL & LS SPINE  CNS:  AAO X 2-3      RADIOLOGY :    RADIOLOGY AND READINGS REVIEWED    ASSESSMENT :       PLAN:  HPI:  64 y/o M, A&Ox3, bedbound, chronic daley catheter with CKD (baseline creat 3.8), COPD, CHFpEF, BPH, PVD, Lymphedema, decubitus ulcers (Sacrum/gluteal) , Anemia, poly-neuropathy, poly-arthritis, hypothyroidism, seizures and Bipolar disorder was BIB EMS from [place] for abdominal pain, [place]. Mr. Dobias states that he developed acute onset sharp abdominal pain 4 days ago a/w "brown" emesis (witnessed by EMS). Unable to tolerate food/drink x 4 days. Having normal bowel movements, last this morning 5/28. Denies any fevers. NH paperwork states concern for obstruction.  Patient endorses BLOODY EMESIS x 4 days upon further questioning. (29 May 2023 01:36)      # RESOLVING SEPSIS S/T P.AERUGINOSA BACTEREMIA + COMPLICATED UTI    - ON CEFEPIME, BCX [P. AERUGINOSA] AND UCX [ > 3 ORGANISMS], REPEAT BCX - NGTD  - ID CONSULT  - CRITICAL CARE EVALUATION IN PROGRESS      # RESOLVING GI BLEED  # ESOPHAGITIS  # ANEMIA OF CHRONIC DISEASE - MONITOR HGB, TRANSFUSION THRESHOLD HGB < 8  - S/P EGD - ULCERATIVE ESOPHAGITIS  - PPI BID  - CARAFATE QID  - ADVANCING DIET PER GI RECOMMENDATION   - GI CONSULT        # ANEMIA DUE TO GI BLEED, ANEMIA OF CKD     # CHRONIC HYPOXIC RESPIRATORY FAILURE S/T UNDERLYING COPD  - ON PRN O2  - CRITICAL CARE EVALUATION IN PROGRESS      # AMS DUE TO ACUTE METABOLIC ENCEPHALOPATHY      # SHEYLA ON CKD STAGE 4   # CHRONIC DALEY, BPH  - DALEY   - STRICT IS AND OS  - AVOID NEPHROTOXIC AGENTS  - MONITOR CR  - NEPHROLOGY CONSULT    # AMBULATORY DYSFUNCTION, IMPAIRED GAIT DUE TO GENERALIZED MUSCLE WEAKNESS, CERVICAL & LS SPINAL STENOSIS, POLYARTRHITIS & PERIPHERAL NEUROPATHY. OP  - FALL PRECAUTIONS    # DYSPHAGIA DUE TO METABOLIC ENCEPHALOPATHY - ON MODIFIED DIET PER SWALLOW EVAL      # HX OF SEIZURE DX  - ON VALPROIC ACID    # HYPOTHYROIDISM - ON LEVOTHYROXINE    # PRESSURE ULCERS  - PATIENT IS HIGH RISK FOR PRESSURE ULCERS DESPITE PREVENTIVE MEASURES IN PLACE  - WOUND CARE CONSULT    # GI PPX

## 2023-06-05 NOTE — PROGRESS NOTE ADULT - NS ATTEND AMEND GEN_ALL_CORE FT
64 y/o M, A&Ox3, walks with walker+assistance, chronic daley(since Dec 2022, failed TOV since), CKD4, HFpEF, BPH, PVD, Lymphedema, decubitus ulcers (Sacrum/gluteal) , Anemia, poly-neuropathy, poly-arthritis, hypothyroidism, seizures and Bipolar disorder was BIB EMS from [place] for abdominal pain, NV. Admit to icu for hypotension. Patient endorses dark colored emesis     Assessment:  1. Sepsis  2. Vomiting  3. Catheter associated complicated UTI  4. SHEYLA on CKD stage 4   5. Seizure disorder  6. HFpEF  7. Pressure injury  8. Pseudomonas BSI    Plan:  - Tolerating diet  - cont. PPI   - Repeat blood cultures negative  - Cont. Antibiotics per ID, to be discharged on PO antibiotics  - ID consult appreciated  - Cont. BPH medications  - Passed void trial  - Cont. sodium bicarb supplementation   - Nephrology follow up  - DVT prophylaxis  - PT evaluation  - Dispo planning

## 2023-06-05 NOTE — PROGRESS NOTE ADULT - PROBLEM SELECTOR PLAN 3
S/P EGD  +Ulcerative esophagitis  Continue PPI and Carafate /needs 2 weeks  Tolerating Full liquid diet. Will try to advance diet later today.

## 2023-06-05 NOTE — PROGRESS NOTE ADULT - SUBJECTIVE AND OBJECTIVE BOX
Covering for GAYLA Lancaster  63y  Patient is a 63y old  Male who presents with a chief complaint of sepsis (04 Jun 2023 18:50)    HPI:  Seen and examined.    HEALTH ISSUES - PROBLEM Dx:  GIB (gastrointestinal bleeding)    Sepsis    Anemia    Hypothyroid    CKD (chronic kidney disease)    BPH (benign prostatic hyperplasia)    Bipolar disorder    Advance care planning    Seizure    Esophagitis    Hypokalemia    Ambulatory dysfunction    Prophylactic measure          MEDICATIONS  (STANDING):  albuterol    90 MICROgram(s) HFA Inhaler 2 Puff(s) Inhalation every 6 hours  buPROPion XL (24-Hour) . 150 milliGRAM(s) Oral daily  cefepime   IVPB      cefepime   IVPB 1000 milliGRAM(s) IV Intermittent every 12 hours  chlorhexidine 2% Cloths 1 Application(s) Topical <User Schedule>  divalproex  milliGRAM(s) Oral two times a day  dorzolamide 2% Ophthalmic Solution 1 Drop(s) Right EYE <User Schedule>  finasteride 5 milliGRAM(s) Oral daily  heparin   Injectable 5000 Unit(s) SubCutaneous every 8 hours  levothyroxine 137 MICROGram(s) Oral daily  pantoprazole    Tablet 40 milliGRAM(s) Oral two times a day  sodium bicarbonate 650 milliGRAM(s) Oral three times a day  sucralfate suspension 1 Gram(s) Oral four times a day  tamsulosin 0.4 milliGRAM(s) Oral at bedtime  traZODone 50 milliGRAM(s) Oral at bedtime  vitamin A &amp; D Ointment 1 Application(s) Topical at bedtime    MEDICATIONS  (PRN):  metoclopramide Injectable 5 milliGRAM(s) IV Push every 6 hours PRN nausea/vomit    Vital Signs Last 24 Hrs  T(C): 36.3 (05 Jun 2023 05:00), Max: 36.6 (04 Jun 2023 11:59)  T(F): 97.3 (05 Jun 2023 05:00), Max: 97.8 (04 Jun 2023 11:59)  HR: 87 (05 Jun 2023 05:00) (87 - 125)  BP: 140/95 (05 Jun 2023 05:00) (95/60 - 152/118)  BP(mean): 87 (04 Jun 2023 12:15) (87 - 87)  RR: 20 (05 Jun 2023 05:00) (20 - 20)  SpO2: 100% (05 Jun 2023 05:00) (100% - 100%)    Parameters below as of 05 Jun 2023 05:00  Patient On (Oxygen Delivery Method): room air      Daily     Daily     PHYSICAL EXAM:  Constitutional:  She appears comfortable and not distressed. Not diaphoretic.    Neck:  The thyroid is normal. Trachea is midline.     Respiratory: The lungs are clear to auscultation. No dullness and expansion is normal.    Cardiovascular: S1 and S2 are normal. No mummurs, rubs or gallops are present.    Gastrointestinal: The abdomen is soft. No tenderness is present. No masses are present. Bowel sounds are normal.    Genitourinary: The bladder is not distended. No CVA tenderness is present.    Extremities: No edema is noted. No deformities are present.    Neurological: Cognition is normal. Tone, power and sensation are normal. Gait is steady.    Skin: No lesions are seen  or palpated.    Lymph Nodes: No lymphadenopathy is present.    Psychiatric: Mood is appropriate. No hallucinations or flight of ideas are noted.                              8.9    8.04  )-----------( 210      ( 05 Jun 2023 07:24 )             29.6     06-05    141  |  114<H>  |  72<H>  ----------------------------<  83  3.4<L>   |  17<L>  |  3.80<H>    Ca    10.1      05 Jun 2023 07:24  Phos  2.5     06-05  Mg     2.0     06-05    TPro  5.4<L>  /  Alb  1.5<L>  /  TBili  0.2  /  DBili  x   /  AST  6<L>  /  ALT  <6<L>  /  AlkPhos  95  06-05

## 2023-06-05 NOTE — PROGRESS NOTE ADULT - NS ATTEND AMEND GEN_ALL_CORE FT
Impression: This is a 62 Y/O Male from [facility] presented to ED  abdominal pain, nausea and vomiting brown emesis. Patient admitted to ICU for Hypotension likely from Sepsis secondary to complicated UTI and esophagitis. CT head was negative for acute changes. CT abd showed circumferential wall thickening of distal esophagus. GI consulted recommending endoscopy for esophagitis. Has COPD but no exacerbation at this time. 06-01-23 Negative Blood Cx x2. 06-04-23 Negative PCR for Covid 19.      Suggestion:  O2 saturation 98% room air. So far saturating good room air.  Continue Albuterol 90 mcg 2 Puffs Q 6 Hours.   On Cefepime 1 gm IVPB Q 12 Hours .     PPI and carafate as per GI.  DVT prophylactic . On Heparin 5,000 Units SQ Q 8 Hours .

## 2023-06-05 NOTE — PROGRESS NOTE ADULT - SUBJECTIVE AND OBJECTIVE BOX
GAYLA PEARCE    SCU progress note    INTERVAL HPI/OVERNIGHT EVENTS: ***No overnight events    DNR [ ]   DNI  [  ]   FULL CODE    Covid - 19 PCR: Negative 6/4    The 4Ms    What Matters Most: see GOC  Age appropriate Medications/Screen for High Risk Medication: Yes  Mentation: see CAM below  Mobility: defer to physical exam    The Confusion Assessment Method (CAM) Diagnostic Algorithm     1: Acute Onset or Fluctuating Course  - Is there evidence of an acute change in mental status from the patient’s baseline? Did the (abnormal) behavior  fluctuate during the day, that is, tend to come and go, or increase and decrease in severity?  [ ] YES [x ] NO     2: Inattention  - Did the patient have difficulty focusing attention, being easily distractible, or having difficulty keeping track of what was being said?  [ ] YES [x ] NO     3: Disorganized thinking  -Was the patient’s thinking disorganized or incoherent, such as rambling or irrelevant conversation, unclear or illogical flow of ideas, or unpredictable switching from subject to subject?  [ ] YES [x ] NO    4: Altered Level of consciousness?  [ ] YES [x ] NO    The diagnosis of delirium by CAM requires the presence of features 1 and 2 and either 3 or 4.    PRESSORS: [ ] YES [x ] NO  cefepime   IVPB      cefepime   IVPB 1000 milliGRAM(s) IV Intermittent every 12 hours    Cardiovascular:  Heart Failure  Acute   Acute on Chronic  Chronic         Pulmonary:  albuterol    90 MICROgram(s) HFA Inhaler 2 Puff(s) Inhalation every 6 hours    Hematalogic:  heparin   Injectable 5000 Unit(s) SubCutaneous every 8 hours    Other:  buPROPion XL (24-Hour) . 150 milliGRAM(s) Oral daily  chlorhexidine 2% Cloths 1 Application(s) Topical <User Schedule>  divalproex  milliGRAM(s) Oral two times a day  dorzolamide 2% Ophthalmic Solution 1 Drop(s) Right EYE <User Schedule>  finasteride 5 milliGRAM(s) Oral daily  levothyroxine 137 MICROGram(s) Oral daily  metoclopramide Injectable 5 milliGRAM(s) IV Push every 6 hours PRN  pantoprazole    Tablet 40 milliGRAM(s) Oral two times a day  sodium bicarbonate 650 milliGRAM(s) Oral three times a day  sucralfate suspension 1 Gram(s) Oral four times a day  tamsulosin 0.4 milliGRAM(s) Oral at bedtime  traZODone 50 milliGRAM(s) Oral at bedtime  vitamin A &amp; D Ointment 1 Application(s) Topical at bedtime    albuterol    90 MICROgram(s) HFA Inhaler 2 Puff(s) Inhalation every 6 hours  buPROPion XL (24-Hour) . 150 milliGRAM(s) Oral daily  cefepime   IVPB      cefepime   IVPB 1000 milliGRAM(s) IV Intermittent every 12 hours  chlorhexidine 2% Cloths 1 Application(s) Topical <User Schedule>  divalproex  milliGRAM(s) Oral two times a day  dorzolamide 2% Ophthalmic Solution 1 Drop(s) Right EYE <User Schedule>  finasteride 5 milliGRAM(s) Oral daily  heparin   Injectable 5000 Unit(s) SubCutaneous every 8 hours  levothyroxine 137 MICROGram(s) Oral daily  metoclopramide Injectable 5 milliGRAM(s) IV Push every 6 hours PRN  pantoprazole    Tablet 40 milliGRAM(s) Oral two times a day  sodium bicarbonate 650 milliGRAM(s) Oral three times a day  sucralfate suspension 1 Gram(s) Oral four times a day  tamsulosin 0.4 milliGRAM(s) Oral at bedtime  traZODone 50 milliGRAM(s) Oral at bedtime  vitamin A &amp; D Ointment 1 Application(s) Topical at bedtime    Drug Dosing Weight  Height (cm): 172.7 (28 May 2023 19:50)  Weight (kg): 83.9 (28 May 2023 19:50)  BMI (kg/m2): 28.1 (28 May 2023 19:50)  BSA (m2): 1.98 (28 May 2023 19:50)    CENTRAL LINE: [ ] YES [x ] NO  LOCATION:   DATE INSERTED:  REMOVE: [ ] YES [ ] NO  EXPLAIN:    HATFIELD: [ ] YES [x ] NO    DATE INSERTED:  REMOVE:  [ ] YES [ ] NO  EXPLAIN:    PAST MEDICAL & SURGICAL HISTORY:  Hypothyroid      Hyperlipidemia      Ambulatory dysfunction      Anemia      History of BPH      CKD (chronic kidney disease)      Chronic obstructive pulmonary disease (COPD)      Bipolar disorder      HLD (hyperlipidemia)      BPH (benign prostatic hyperplasia)      Chronic diastolic congestive heart failure      Lymphedema      Chronic leg pain      No significant past surgical history                  06-04 @ 07:01 - 06-05 @ 07:00  --------------------------------------------------------  IN: 550 mL / OUT: 275 mL / NET: 275 mL            PHYSICAL EXAM:    GENERAL: NAD, well-groomed, well-developed  HEAD:  Atraumatic, Normocephalic  EYES: EOMI, PERRLA, conjunctiva and sclera clear  ENMT: No tonsillar erythema, exudates  NECK: Supple, No JVD, Normal thyroid  NERVOUS SYSTEM:  Alert & Oriented X3, Follows commands, moving all extremities.  CHEST/LUNG: Diminished breath sounds bilateral bases; No rales, rhonchi, wheezing, or rubs  HEART: Regular rate and rhythm; No murmurs, rubs, or gallops  ABDOMEN: Soft, Nontender, Nondistended; Bowel sounds present  EXTREMITIES: +Chronic venous stasis bilateral lower extremities. Trace edema BL lower extremities. 2+ Peripheral Pulses, No clubbing, cyanosis  LYMPH: No lymphadenopathy noted  SKIN: Chronic venous stasis BL lower extremities. Stage 1 Pressure Injury to the Bilateral Heels, Bilateral Gluteus, and Coccyx areas; Stage 2 Pressure Injury to the R. Gluteus       LABS:  CBC Full  -  ( 05 Jun 2023 07:24 )  WBC Count : 8.04 K/uL  RBC Count : 2.93 M/uL  Hemoglobin : 8.9 g/dL  Hematocrit : 29.6 %  Platelet Count - Automated : 210 K/uL  Mean Cell Volume : 101.0 fl  Mean Cell Hemoglobin : 30.4 pg  Mean Cell Hemoglobin Concentration : 30.1 gm/dL  Auto Neutrophil # : x  Auto Lymphocyte # : x  Auto Monocyte # : x  Auto Eosinophil # : x  Auto Basophil # : x  Auto Neutrophil % : x  Auto Lymphocyte % : x  Auto Monocyte % : x  Auto Eosinophil % : x  Auto Basophil % : x    06-05    141  |  114<H>  |  72<H>  ----------------------------<  83  3.4<L>   |  17<L>  |  3.80<H>    Ca    10.1      05 Jun 2023 07:24  Phos  2.5     06-05  Mg     2.0     06-05    TPro  5.4<L>  /  Alb  1.5<L>  /  TBili  0.2  /  DBili  x   /  AST  6<L>  /  ALT  <6<L>  /  AlkPhos  95  06-05              [  ]  DVT Prophylaxis  [  ]  Nutrition, Brand, Rate         Goal Rate        Abnormal Nutritional Status          RADIOLOGY & ADDITIONAL STUDIES:  ***    Goals of Care Discussion with Family/Proxy/Other   - see note from 5/30

## 2023-06-06 NOTE — PROGRESS NOTE ADULT - SUBJECTIVE AND OBJECTIVE BOX
Patient is a 63y old  Male who presents with a chief complaint of sepsis (06 Jun 2023 14:39)    PATIENT IS SEEN AND EXAMINED IN MEDICAL FLOOR.  DORIST [    ]    ALYSIA [   ]      GT [   ]    ALLERGIES:  No Known Allergies      Daily     Daily     VITALS:    Vital Signs Last 24 Hrs  T(C): 36.4 (06 Jun 2023 21:10), Max: 37 (06 Jun 2023 04:55)  T(F): 97.6 (06 Jun 2023 21:10), Max: 98.6 (06 Jun 2023 04:55)  HR: 93 (06 Jun 2023 21:10) (87 - 95)  BP: 93/43 (06 Jun 2023 21:10) (92/44 - 142/82)  BP(mean): 54 (06 Jun 2023 21:10) (54 - 54)  RR: 19 (06 Jun 2023 21:10) (19 - 20)  SpO2: 100% (06 Jun 2023 21:10) (100% - 100%)    Parameters below as of 06 Jun 2023 21:10  Patient On (Oxygen Delivery Method): room air        LABS:    CBC Full  -  ( 06 Jun 2023 07:40 )  WBC Count : 8.61 K/uL  RBC Count : 2.79 M/uL  Hemoglobin : 8.4 g/dL  Hematocrit : 27.6 %  Platelet Count - Automated : 184 K/uL  Mean Cell Volume : 98.9 fl  Mean Cell Hemoglobin : 30.1 pg  Mean Cell Hemoglobin Concentration : 30.4 gm/dL  Auto Neutrophil # : x  Auto Lymphocyte # : x  Auto Monocyte # : x  Auto Eosinophil # : x  Auto Basophil # : x  Auto Neutrophil % : x  Auto Lymphocyte % : x  Auto Monocyte % : x  Auto Eosinophil % : x  Auto Basophil % : x      06-06    142  |  117<H>  |  71<H>  ----------------------------<  96  3.5   |  16<L>  |  4.48<H>    Ca    10.1      06 Jun 2023 07:40  Phos  2.8     06-06  Mg     2.1     06-06    TPro  5.2<L>  /  Alb  1.5<L>  /  TBili  0.1<L>  /  DBili  x   /  AST  <3<L>  /  ALT  <6<L>  /  AlkPhos  89  06-06    CAPILLARY BLOOD GLUCOSE            LIVER FUNCTIONS - ( 06 Jun 2023 07:40 )  Alb: 1.5 g/dL / Pro: 5.2 g/dL / ALK PHOS: 89 U/L / ALT: <6 U/L DA / AST: <3 U/L / GGT: x           Creatinine Trend: 4.48<--, 3.80<--, 3.44<--, 3.17<--, 3.11<--, 3.18<--  I&O's Summary    05 Jun 2023 07:01  -  06 Jun 2023 07:00  --------------------------------------------------------  IN: 100 mL / OUT: 740 mL / NET: -640 mL    06 Jun 2023 07:01  -  06 Jun 2023 21:52  --------------------------------------------------------  IN: 0 mL / OUT: 900 mL / NET: -900 mL            .Blood Blood  06-01 @ 10:52   No Growth Final  --  --      .Blood Blood  06-01 @ 10:46   No Growth Final  --  --      .Blood Blood-Peripheral  05-28 @ 21:09   No Growth Final  --  Blood Culture PCR  Pseudomonas aeruginosa      Clean Catch Clean Catch (Midstream)  05-28 @ 21:02   >=3 organisms. Probable collection contamination.  --  --          MEDICATIONS:    MEDICATIONS  (STANDING):  albuterol    90 MICROgram(s) HFA Inhaler 2 Puff(s) Inhalation every 6 hours  buPROPion XL (24-Hour) . 150 milliGRAM(s) Oral daily  cefepime   IVPB 1000 milliGRAM(s) IV Intermittent every 12 hours  cefepime   IVPB      chlorhexidine 2% Cloths 1 Application(s) Topical <User Schedule>  dextrose 5% + sodium chloride 0.45%. 1000 milliLiter(s) (75 mL/Hr) IV Continuous <Continuous>  divalproex  milliGRAM(s) Oral two times a day  dorzolamide 2% Ophthalmic Solution 1 Drop(s) Right EYE <User Schedule>  finasteride 5 milliGRAM(s) Oral daily  heparin   Injectable 5000 Unit(s) SubCutaneous every 8 hours  levothyroxine 137 MICROGram(s) Oral daily  pantoprazole    Tablet 40 milliGRAM(s) Oral two times a day  sodium bicarbonate 1300 milliGRAM(s) Oral three times a day  sucralfate suspension 1 Gram(s) Oral four times a day  tamsulosin 0.4 milliGRAM(s) Oral at bedtime  traZODone 50 milliGRAM(s) Oral at bedtime  vitamin A &amp; D Ointment 1 Application(s) Topical at bedtime      MEDICATIONS  (PRN):  metoclopramide Injectable 5 milliGRAM(s) IV Push every 6 hours PRN nausea/vomit      REVIEW OF SYSTEMS:                           ALL ROS DONE [ X   ]    CONSTITUTIONAL:  LETHARGIC [   ], FEVER [   ], UNRESPONSIVE [   ]  CVS:  CP  [   ], SOB, [   ], PALPITATIONS [   ], DIZZYNESS [   ]  RS: COUGH [   ], SPUTUM [   ]  GI: ABDOMINAL PAIN [   ], NAUSEA [   ], VOMITINGS [   ], DIARRHEA [   ], CONSTIPATION [   ]  :  DYSURIA [   ], NOCTURIA [   ], INCREASED FREQUENCY [   ], DRIBLING [   ],  SKELETAL: PAINFUL JOINTS [   ], SWOLLEN JOINTS [   ], NECK ACHE [   ], LOW BACK ACHE [   ],  SKIN : ULCERS [   ], RASH [   ], ITCHING [   ]  CNS: HEAD ACHE [   ], DOUBLE VISION [   ], BLURRED VISION [   ], AMS / CONFUSION [   ], SEIZURES [   ], WEAKNESS [   ],TINGLING / NUMBNESS [   ]    PHYSICAL EXAMINATION:    GENERAL APPEARANCE: NO DISTRESS  HEENT:  NO PALLOR, NO  JVD,  NO   NODES, NECK SUPPLE  CVS: S1 +, S2 +,   RS: AEEB,  OCCASIONAL  RALES +,   NO RONCHI  ABD: SOFT, NT, NO, BS +  EXT: PE+  SKIN: WARM,   SKELETAL:  ROM REDUCED AT CERVICAL & LS SPINE  CNS:  AAO X 2-3      RADIOLOGY :    RADIOLOGY AND READINGS REVIEWED    ASSESSMENT :       PLAN:  HPI:  64 y/o M, A&Ox3, bedbound, chronic daley catheter with CKD (baseline creat 3.8), COPD, CHFpEF, BPH, PVD, Lymphedema, decubitus ulcers (Sacrum/gluteal) , Anemia, poly-neuropathy, poly-arthritis, hypothyroidism, seizures and Bipolar disorder was BIB EMS from [place] for abdominal pain, NV. Mr. Rhoades states that he developed acute onset sharp abdominal pain 4 days ago a/w "brown" emesis (witnessed by EMS). Unable to tolerate food/drink x 4 days. Having normal bowel movements, last this morning 5/28. Denies any fevers. NH paperwork states concern for obstruction.  Patient endorses BLOODY EMESIS x 4 days upon further questioning. (29 May 2023 01:36)      # RESOLVING SEPSIS S/T P.AERUGINOSA BACTEREMIA + COMPLICATED UTI    - ON CEFEPIME, BCX [P. AERUGINOSA] AND UCX [ > 3 ORGANISMS], REPEAT BCX - NGTD  - ID CONSULT  - CRITICAL CARE EVALUATION IN PROGRESS      # RESOLVING GI BLEED  # ESOPHAGITIS  # ANEMIA OF CHRONIC DISEASE - MONITOR HGB, TRANSFUSION THRESHOLD HGB < 8  - S/P EGD - ULCERATIVE ESOPHAGITIS  - PPI BID  - CARAFATE QID  - ADVANCING DIET PER GI RECOMMENDATION   - GI CONSULT    - [6/6] - EPISODE OF NAUSEA/VOMITING - MONITORING CLOSELY, PRN ANTIEMETICS    # ANEMIA DUE TO GI BLEED, ANEMIA OF CKD     # CHRONIC HYPOXIC RESPIRATORY FAILURE S/T UNDERLYING COPD  - ON PRN O2  - CRITICAL CARE EVALUATION IN PROGRESS    # AMS DUE TO ACUTE METABOLIC ENCEPHALOPATHY      # SHEYLA ON CKD STAGE 4   # CHRONIC DALEY, BPH  - DALEY   - STRICT IS AND OS  - AVOID NEPHROTOXIC AGENTS  - MONITOR CR  - NEPHROLOGY CONSULT    # AMBULATORY DYSFUNCTION, IMPAIRED GAIT DUE TO GENERALIZED MUSCLE WEAKNESS, CERVICAL & LS SPINAL STENOSIS, POLYARTRHITIS & PERIPHERAL NEUROPATHY. OP  - FALL PRECAUTIONS    # DYSPHAGIA DUE TO METABOLIC ENCEPHALOPATHY - ON MODIFIED DIET PER SWALLOW EVAL      # HX OF SEIZURE DX  - ON VALPROIC ACID    # HYPOTHYROIDISM - ON LEVOTHYROXINE    # PRESSURE ULCERS  - PATIENT IS HIGH RISK FOR PRESSURE ULCERS DESPITE PREVENTIVE MEASURES IN PLACE  - WOUND CARE CONSULT    # GI PPX

## 2023-06-06 NOTE — PROGRESS NOTE ADULT - SUBJECTIVE AND OBJECTIVE BOX
GAYLA PEARCE    SCU progress note    INTERVAL HPI/OVERNIGHT EVENTS: ***No overnight events. Appetite poor.    DNR [ ]   DNI  [  ]   FULL CODE    Covid - 19 PCR: Negative 6/4    The 4Ms    What Matters Most: see GOC  Age appropriate Medications/Screen for High Risk Medication: Yes  Mentation: see CAM below  Mobility: defer to physical exam    The Confusion Assessment Method (CAM) Diagnostic Algorithm     1: Acute Onset or Fluctuating Course  - Is there evidence of an acute change in mental status from the patient’s baseline? Did the (abnormal) behavior  fluctuate during the day, that is, tend to come and go, or increase and decrease in severity?  [ ] YES [x ] NO     2: Inattention  - Did the patient have difficulty focusing attention, being easily distractible, or having difficulty keeping track of what was being said?  [ ] YES [x ] NO     3: Disorganized thinking  -Was the patient’s thinking disorganized or incoherent, such as rambling or irrelevant conversation, unclear or illogical flow of ideas, or unpredictable switching from subject to subject?  [ ] YES [x ] NO    4: Altered Level of consciousness?  [ ] YES [x ] NO    The diagnosis of delirium by CAM requires the presence of features 1 and 2 and either 3 or 4.    PRESSORS: [ ] YES [x ] NO  cefepime   IVPB      cefepime   IVPB 1000 milliGRAM(s) IV Intermittent every 12 hours    Cardiovascular:  Heart Failure  Acute   Acute on Chronic  Chronic         Pulmonary:  albuterol    90 MICROgram(s) HFA Inhaler 2 Puff(s) Inhalation every 6 hours    Hematalogic:  heparin   Injectable 5000 Unit(s) SubCutaneous every 8 hours    Other:  buPROPion XL (24-Hour) . 150 milliGRAM(s) Oral daily  chlorhexidine 2% Cloths 1 Application(s) Topical <User Schedule>  divalproex  milliGRAM(s) Oral two times a day  dorzolamide 2% Ophthalmic Solution 1 Drop(s) Right EYE <User Schedule>  finasteride 5 milliGRAM(s) Oral daily  levothyroxine 137 MICROGram(s) Oral daily  metoclopramide Injectable 5 milliGRAM(s) IV Push every 6 hours PRN  pantoprazole    Tablet 40 milliGRAM(s) Oral two times a day  sodium bicarbonate 1300 milliGRAM(s) Oral three times a day  sucralfate suspension 1 Gram(s) Oral four times a day  tamsulosin 0.4 milliGRAM(s) Oral at bedtime  traZODone 50 milliGRAM(s) Oral at bedtime  vitamin A &amp; D Ointment 1 Application(s) Topical at bedtime    albuterol    90 MICROgram(s) HFA Inhaler 2 Puff(s) Inhalation every 6 hours  buPROPion XL (24-Hour) . 150 milliGRAM(s) Oral daily  cefepime   IVPB      cefepime   IVPB 1000 milliGRAM(s) IV Intermittent every 12 hours  chlorhexidine 2% Cloths 1 Application(s) Topical <User Schedule>  divalproex  milliGRAM(s) Oral two times a day  dorzolamide 2% Ophthalmic Solution 1 Drop(s) Right EYE <User Schedule>  finasteride 5 milliGRAM(s) Oral daily  heparin   Injectable 5000 Unit(s) SubCutaneous every 8 hours  levothyroxine 137 MICROGram(s) Oral daily  metoclopramide Injectable 5 milliGRAM(s) IV Push every 6 hours PRN  pantoprazole    Tablet 40 milliGRAM(s) Oral two times a day  sodium bicarbonate 1300 milliGRAM(s) Oral three times a day  sucralfate suspension 1 Gram(s) Oral four times a day  tamsulosin 0.4 milliGRAM(s) Oral at bedtime  traZODone 50 milliGRAM(s) Oral at bedtime  vitamin A &amp; D Ointment 1 Application(s) Topical at bedtime    Drug Dosing Weight  Height (cm): 172.7 (28 May 2023 19:50)  Weight (kg): 83.9 (28 May 2023 19:50)  BMI (kg/m2): 28.1 (28 May 2023 19:50)  BSA (m2): 1.98 (28 May 2023 19:50)    CENTRAL LINE: [ ] YES [x ] NO  LOCATION:   DATE INSERTED:  REMOVE: [ ] YES [ ] NO  EXPLAIN:    HATFIELD: [ ] YES [x ] NO    DATE INSERTED:  REMOVE:  [ ] YES [ ] NO  EXPLAIN:    PAST MEDICAL & SURGICAL HISTORY:  Hypothyroid      Hyperlipidemia      Ambulatory dysfunction      Anemia      History of BPH      CKD (chronic kidney disease)      Chronic obstructive pulmonary disease (COPD)      Bipolar disorder      HLD (hyperlipidemia)      BPH (benign prostatic hyperplasia)      Chronic diastolic congestive heart failure      Lymphedema      Chronic leg pain      No significant past surgical history                  06-05 @ 07:01  -  06-06 @ 07:00  --------------------------------------------------------  IN: 100 mL / OUT: 740 mL / NET: -640 mL            PHYSICAL EXAM:    GENERAL: NAD, well-groomed, well-developed  HEAD:  Atraumatic, Normocephalic  EYES: EOMI, PERRLA, conjunctiva and sclera clear  ENMT: No tonsillar erythema, exudates  NECK: Supple, No JVD  NERVOUS SYSTEM:  Alert & Oriented X3, Follows commands. Moving all extremities. Extremities weak bilaterally.  CHEST/LUNG: Diminished breath sounds bilateral bases.   HEART: Regular rate and rhythm; No murmurs, rubs, or gallops  ABDOMEN: Vomited X 2 small amount of bilious material.  Soft, Nontender, Nondistended; Bowel sounds present  EXTREMITIES:  Weak bilaterally. 2+ Peripheral Pulses, No clubbing, cyanosis, or edema  LYMPH: No lymphadenopathy noted  SKIN: Chronic venous stasis BL lower extremities. Stage 1 Pressure Injury to the Bilateral Heels, Bilateral Gluteus, and Coccyx areas; Stage 2 Pressure Injury to the R. Gluteus         LABS:  CBC Full  -  ( 06 Jun 2023 07:40 )  WBC Count : 8.61 K/uL  RBC Count : 2.79 M/uL  Hemoglobin : 8.4 g/dL  Hematocrit : 27.6 %  Platelet Count - Automated : 184 K/uL  Mean Cell Volume : 98.9 fl  Mean Cell Hemoglobin : 30.1 pg  Mean Cell Hemoglobin Concentration : 30.4 gm/dL  Auto Neutrophil # : x  Auto Lymphocyte # : x  Auto Monocyte # : x  Auto Eosinophil # : x  Auto Basophil # : x  Auto Neutrophil % : x  Auto Lymphocyte % : x  Auto Monocyte % : x  Auto Eosinophil % : x  Auto Basophil % : x    06-06    142  |  117<H>  |  71<H>  ----------------------------<  96  3.5   |  16<L>  |  x     Ca    10.1      06 Jun 2023 07:40  Phos  2.8     06-06  Mg     2.1     06-06    TPro  x   /  Alb  1.5<L>  /  TBili  x   /  DBili  x   /  AST  x   /  ALT  x   /  AlkPhos  x   06-06              [  ]  DVT Prophylaxis  [  ]  Nutrition, Brand, Rate         Goal Rate        Abnormal Nutritional Status -  Malnutrition       RADIOLOGY & ADDITIONAL STUDIES:  ***  < from: Xray Chest 1 View- PORTABLE-Urgent (Xray Chest 1 View- PORTABLE-Urgent .) (05.28.23 @ 20:42) >  COMPARISON: 12/11/2022    The cardiomediastinal silhouette is normal and the anthony are not enlarged.   The trachea is midline. Suboptimal degree of inspiration with   bronchovascular crowding. Mild platelike atelectatic change right base.   There is no focal lung consolidation or sizable pleural effusion. No   significant osseous abnormality. Degenerative changes of the spine and AC   joints. Old right rib fracture deformities.    IMPRESSION:    Unremarkable frontal chest x ray    < end of copied text >  < from: CT Abdomen and Pelvis No Cont (05.28.23 @ 22:34) >  CONTRAST/COMPLICATIONS:  IV Contrast: NONE  Evaluation of the visceral organs is limited without   intravenous contrast  Oral Contrast: NONE  Complications: None reported at time of study completion    PROCEDURE:  CT of the Abdomen and Pelvis was performed.  Sagittal and coronal reformats were performed.    FINDINGS:  LOWER CHEST: Bibasilar subsegmental atelectasis. Circumferential wall   thickening of the distal esophagus.    LIVER: Within normal limits.  BILE DUCTS: Normal caliber.  GALLBLADDER: Cholecystectomy.  SPLEEN: Within normal limits.  PANCREAS: Within normal limits.  ADRENALS: Within normal limits.  KIDNEYS/URETERS: Redemonstrated moderate nonspecific bilateral   perinephric fat stranding. No hydronephrosis.    BLADDER: Collapsed around a [place]field catheter balloon. Intravesicular gas   secondary to instrumentation. Circumferential wall thickening.  REPRODUCTIVE ORGANS: Prostate within normal limits.    BOWEL: No bowel obstruction or inflammation. Scattered colonic   diverticulosis without diverticulitis. Appendix is normal.  PERITONEUM: No ascites.  VESSELS: Infrarenal IVC filter.  RETROPERITONEUM/LYMPH NODES: No lymphadenopathy.  ABDOMINAL WALL: Small bilateral fat-containing inguinal hernias.  BONES: Old right rib fractures. Severe right hip degenerative change with   remodeling of the right femoral head. Degenerative changes of the spine.    IMPRESSION:  1. No bowel obstruction or inflammation.  2. Circumferential wall thickening of the distal esophagus which could be   due to an esophagitis.  3. Circumferential urinary bladder wall thickening which could be due to   underdistention versus a cystitis. Correlate with urinalysis.        --- End of Report ---        < end of copied text >  < from: CT Abdomen and Pelvis No Cont (05.28.23 @ 22:34) >  CONTRAST/COMPLICATIONS:  IV Contrast: NONE  Evaluation of the visceral organs is limited without   intravenous contrast  Oral Contrast: NONE  Complications: None reported at time of study completion    PROCEDURE:  CT of the Abdomen and Pelvis was performed.  Sagittal and coronal reformats were performed.    FINDINGS:  LOWER CHEST: Bibasilar subsegmental atelectasis. Circumferential wall   thickening of the distal esophagus.    LIVER: Within normal limits.  BILE DUCTS: Normal caliber.  GALLBLADDER: Cholecystectomy.  SPLEEN: Within normal limits.  PANCREAS: Within normal limits.  ADRENALS: Within normal limits.  KIDNEYS/URETERS: Redemonstrated moderate nonspecific bilateral   perinephric fat stranding. No hydronephrosis.    BLADDER: Collapsed around a [place] catheter balloon. Intravesicular gas   secondary to instrumentation. Circumferential wall thickening.  REPRODUCTIVE ORGANS: Prostate within normal limits.    BOWEL: No bowel obstruction or inflammation. Scattered colonic   diverticulosis without diverticulitis. Appendix is normal.  PERITONEUM: No ascites.  VESSELS: Infrarenal IVC filter.  RETROPERITONEUM/LYMPH NODES: No lymphadenopathy.  ABDOMINAL WALL: Small bilateral fat-containing inguinal hernias.  BONES: Old right rib fractures. Severe right hip degenerative change with   remodeling of the right femoral head. Degenerative changes of the spine.    IMPRESSION:  1. No bowel obstruction or inflammation.  2. Circumferential wall thickening of the distal esophagus which could be   due to an esophagitis.  3. Circumferential urinary bladder wall thickening which could be due to   underdistention versus a cystitis. Correlate with urinalysis.        --- End of Report ---        < end of copied text >  < from: CT Head No Cont (05.28.23 @ 22:33) >  COMPARISON STUDY: 12/4/2022    FINDINGS:    PARENCHYMA AND VENTRICLES: No acute intracranial hemorrhage, mass effect,   or midline shift. No hydrocephalus. Prominence of the sulci and   ventricles, consistent with age-related parenchymal volume loss; the   extent of ventricular and sulcal prominence appears somewhat increased   compared to 12/4/2022. Small vessel white matter changes.  EXTRA-AXIAL: No abnormal extraaxial collection.  PARANASAL SINUSES: Layering fluid in the right sphenoid sinus and   additional mild scattered mucosal thickening, as on prior.  TYMPANOMASTOID CAVITIES: Within normal limits.  ORBITS: Bilateral cataract surgery.  CALVARIUM: Within normal limits.  MISCELLANEOUS: Intracranial atherosclerosis.    IMPRESSION:  No acute intracranial hemorrhage, mass effect, or midline shift.  Paranasal sinus mucosal disease.  The extent of ventricular and sulcal prominence appears increased   compared with 12/4/2022, indeterminate clinical significance.    --- End of Report ---          < end of copied text >    Goals of Care Discussion with Family/Proxy/Other   - see note from 5/30

## 2023-06-06 NOTE — PROGRESS NOTE ADULT - NS ATTEND AMEND GEN_ALL_CORE FT
Problem/Plan - 1:  ·  Problem: Sepsis.   ·  Plan: Secondary to Bacteremia and complicated UTI.  Blood culture positive for Gram negative rods/Pseudomonas  Ucx likely contaminant.   repeated blood cultures (6/1) NGTD  Continue Cefepime- discharge Cipro 250mgs po BID to complete a total of 14 days of all antibiotics( Maxipime and cipro )  Last fay 6/10.     Problem/Plan - 2:  ·  Problem: GIB (gastrointestinal bleeding).   ·  Plan: S/P EGD. No signs of active bleeding.  Continue Carafate and PPI.     Problem/Plan - 3:  ·  Problem: Esophagitis.   ·  Plan: S/P EGD  +Ulcerative esophagitis  Continue PPI and Carafate /needs 2 weeks  Tolerating Full liquid diet. Will try to advance diet later today.     Problem/Plan - 4:  ·  Problem: Seizure.   ·  Plan: Continue valproic acid 500 mg BID.   Valproic acid level less than 40 (Valproic acid for Bipolar disorder).   Maintain seizure precautions.  No s/s of seizures.     Problem/Plan - 5:  ·  Problem: Bipolar disorder.   ·  Plan: Continue Wellbutrin, Trazadone and Valproic acid.   Maintain safety measures.     Problem/Plan - 6:  ·  Problem: Anemia.   ·  Plan: likely anemia of chronic disease  Hemoglobin 8.4.     Problem/Plan - 7:  ·  Problem: BPH (benign prostatic hyperplasia).   ·  Plan: Continue finasteride and tamsulosin.  Continue condom cath.     Problem/Plan - 8:  ·  Problem: CKD (chronic kidney disease).   ·  Plan: SHEYLA on CKD  stage 4. likely pre-renal- resolved SHEYLA  BP goal normotensive. Avoid hypotension.   Baseline around 3.7  Avoid nephrotoxic agents.  continue bicarb.     Problem/Plan - 9:  ·  Problem: Hypothyroid.   ·  Plan: Continue synthroid  Will need outpatient monitoring of TSH.

## 2023-06-06 NOTE — PROGRESS NOTE ADULT - ASSESSMENT
SHEYLA due to Hypovolemia likely. BP is low again and Cr is rising. Possible hypoperfusion but will need to rule out other causes of SHEYLA.    - Bladder scan.  - Hydrate at 50 ml/hour.  - UA and U Na were ordered.  - Follow up BMP.    CKD  4  -Renal diet.  - Avoid Nephrotoxins.  - follow up BMP.    CKD Mineral and Bone Disease:  -Phosphorus is okay without binders; low phos and phos supplemented.  PTH is low and no need vitamin d analog.    Anemia of CKD:  - Transfuse as needed for Hg < 7.    Metabolic Acidosis:  Non AG, likely Distal RTA.  - Increase NaHCO3 to 1300 mg PO TID.    Hypokalemia:  Likely Distal RTA.  - Monitor K and Mg.  - Supplement as needed.

## 2023-06-06 NOTE — CHART NOTE - NSCHARTNOTEFT_GEN_A_CORE
EVENT: urine retention    OBJECTIVE:  Vital Signs Last 24 Hrs  T(C): 36.4 (06 Jun 2023 21:10), Max: 37 (06 Jun 2023 04:55)  T(F): 97.6 (06 Jun 2023 21:10), Max: 98.6 (06 Jun 2023 04:55)  HR: 93 (06 Jun 2023 21:10) (87 - 95)  BP: 93/43 (06 Jun 2023 21:10) (92/44 - 142/82)  BP(mean): 54 (06 Jun 2023 21:10) (54 - 54)  RR: 19 (06 Jun 2023 21:10) (19 - 20)  SpO2: 100% (06 Jun 2023 21:10) (100% - 100%)    Parameters below as of 06 Jun 2023 21:10  Patient On (Oxygen Delivery Method): room air        FOCUSED PHYSICAL EXAM:  GENERAL APPEARANCE: NO DISTRESS  HEENT:  NO PALLOR, NO  JVD,  NO   NODES, NECK SUPPLE  CVS: S1 +, S2 +,   RS: AEEB,  OCCASIONAL  RALES +,   NO RONCHI  ABD: SOFT, NT, NO, BS +  EXT: PE+  SKIN: WARM,   SKELETAL:  ROM REDUCED AT CERVICAL & LS SPINE  CNS:  AAO X 2-3    LABS:                        8.4    8.61  )-----------( 184      ( 06 Jun 2023 07:40 )             27.6     06-06    142  |  117<H>  |  71<H>  ----------------------------<  96  3.5   |  16<L>  |  4.48<H>    Ca    10.1      06 Jun 2023 07:40  Phos  2.8     06-06  Mg     2.1     06-06    TPro  5.2<L>  /  Alb  1.5<L>  /  TBili  0.1<L>  /  DBili  x   /  AST  <3<L>  /  ALT  <6<L>  /  AlkPhos  89  06-06        ASSESSMENT:  urinary retention 2/2 BPH  dribbling urine with excessive residual urine   unable to pass regular urinary catheter due to enlarged prostate    PLAN:   Place daley with cudet cath and leave indwelling with collecting bag to gravity    FOLLOW UP / RESULT:

## 2023-06-06 NOTE — PROGRESS NOTE ADULT - PROBLEM SELECTOR PLAN 10
DVT and GI prophylaxis.  PT recs [facility]   CM aware   Continue attempts to get patient out of bed.  Medically stable for discharge to [facility].

## 2023-06-06 NOTE — PROGRESS NOTE ADULT - ASSESSMENT
63 year old Male, from [place], AOx3, ambulates with walker , chronic FC, h/o obesity, HTN, HLD, PVD, Seizures, CKD4 (base SCr 3.8), anemia, BPH, Lymphedema, hypothyroidism, HFpEF, possible COPD, decubitus ulcer (sacrum/gluteal), polyneuropathy, polyarthritis, bipolar disorder. Patient presented to ED for abdominal pain, nausea and vomiting brown emesis. Patient admitted to ICU for Hypotension likely from Sepsis secondary to complicated UTI and esophagitis. CT head was negative for acute changes. CT abd showed circumferential wall thickening of distal esophagus. GI consulted recommending endoscopy which took place on 5/30 with findings of ulcerative esophagitis. On PPI.   Blood cultures from 5/28 + for P. aeruginasa. Ucx likely contaminant. On cefepime.  Patient with pressure injury. Wound care consult. Nephrology following for SHEYLA on CKD.   06/01: Repeat blood cx pending. Advance diet as tolerated. PT reccs TG (5/30). Plan to return to [place] once repeat Bcx come back. C/w Cefepime for complicated UTI and P. Aeruginosa bacteriemia. Afebrile. BP soft this AM.    06/02: Repeat blood cx pending. On Cefepime. PT reccs TG. Dispo ([place]). Hemodynamically stable. Plan for OOB to chair.   06/03: Repeat Bcx from 6/1 NGTD. Afebrile. On Cefepime. ID following. Hemodynamically stable. Plan to advance diet to full liquids.   6/4: Patient with tachycardia secondary to frustration from not being able to be discharge. Emotional support given, encouraged to verbalize concerns, explained in full the plan of care. Patient VS became stable, will hold off on EKG as isolated event.

## 2023-06-06 NOTE — PROGRESS NOTE ADULT - SUBJECTIVE AND OBJECTIVE BOX
GAYLA PEARCE  63y  Patient is a 63y old  Male who presents with a chief complaint of sepsis (06 Jun 2023 09:05)    HPI: Seen and examined. Followed for SHEYLA on CKD.    HEALTH ISSUES - PROBLEM Dx:  GIB (gastrointestinal bleeding)    Sepsis    Anemia    Hypothyroid    CKD (chronic kidney disease)    BPH (benign prostatic hyperplasia)    Bipolar disorder    Advance care planning    Seizure    Esophagitis    Hypokalemia    Ambulatory dysfunction    Prophylactic measure          MEDICATIONS  (STANDING):  albuterol    90 MICROgram(s) HFA Inhaler 2 Puff(s) Inhalation every 6 hours  buPROPion XL (24-Hour) . 150 milliGRAM(s) Oral daily  cefepime   IVPB      cefepime   IVPB 1000 milliGRAM(s) IV Intermittent every 12 hours  chlorhexidine 2% Cloths 1 Application(s) Topical <User Schedule>  divalproex  milliGRAM(s) Oral two times a day  dorzolamide 2% Ophthalmic Solution 1 Drop(s) Right EYE <User Schedule>  finasteride 5 milliGRAM(s) Oral daily  heparin   Injectable 5000 Unit(s) SubCutaneous every 8 hours  levothyroxine 137 MICROGram(s) Oral daily  pantoprazole    Tablet 40 milliGRAM(s) Oral two times a day  sodium bicarbonate 1300 milliGRAM(s) Oral three times a day  sucralfate suspension 1 Gram(s) Oral four times a day  tamsulosin 0.4 milliGRAM(s) Oral at bedtime  traZODone 50 milliGRAM(s) Oral at bedtime  vitamin A &amp; D Ointment 1 Application(s) Topical at bedtime    MEDICATIONS  (PRN):  metoclopramide Injectable 5 milliGRAM(s) IV Push every 6 hours PRN nausea/vomit    Vital Signs Last 24 Hrs  T(C): 36.6 (06 Jun 2023 12:30), Max: 37 (06 Jun 2023 04:55)  T(F): 97.9 (06 Jun 2023 12:30), Max: 98.6 (06 Jun 2023 04:55)  HR: 87 (06 Jun 2023 12:30) (87 - 98)  BP: 92/44 (06 Jun 2023 12:30) (92/44 - 142/82)  BP(mean): --  RR: 20 (06 Jun 2023 12:30) (19 - 20)  SpO2: 100% (06 Jun 2023 12:30) (98% - 100%)    Parameters below as of 06 Jun 2023 12:30  Patient On (Oxygen Delivery Method): room air      Daily     Daily     PHYSICAL EXAM:  Constitutional: He appears comfortable and not distressed. Not diaphoretic.    Neck:  The thyroid is normal. Trachea is midline.       Respiratory: The lungs are clear to auscultation. No dullness and expansion is normal.    Cardiovascular: S1 and S2 are normal. No mummurs, rubs or gallops are present.    Gastrointestinal: The abdomen is soft. No tenderness is present. Bowel sounds are normal.    Genitourinary: The bladder is not distended. No CVA tenderness is present. [place] catheter in place.    Extremities: No edema is noted. No deformities are present.    Neurological: Tone, power and sensation are normal.     Skin: No lesions are seen  or palpated.    Lymph Nodes: No lymphadenopathy is present.                            8.4    8.61  )-----------( 184      ( 06 Jun 2023 07:40 )             27.6     06-06    142  |  117<H>  |  71<H>  ----------------------------<  96  3.5   |  16<L>  |  4.48<H>    Ca    10.1      06 Jun 2023 07:40  Phos  2.8     06-06  Mg     2.1     06-06    TPro  5.2<L>  /  Alb  1.5<L>  /  TBili  0.1<L>  /  DBili  x   /  AST  <3<L>  /  ALT  <6<L>  /  AlkPhos  89  06-06

## 2023-06-06 NOTE — CHART NOTE - NSCHARTNOTEFT_GEN_A_CORE
Assessment:   63yMalePatient is a 63y old  Male who presents with a chief complaint of sepsis (06 Jun 2023 09:05). Pt Visited.  Observed Lunch Meal.. Untouched. Pt drank Nepro  100 % but later he   threw up.  Offered Food alternate but Pt Refused. Pt with Esophagitis ON Carafate. Pt is On Reglan PRN . May consider Routine Reglan for Possible  increased Po intake. Pt w H/O Bipolar . Labs Noted. Pt with SHEYLA On CKD stage 4. Nephro notes Noted.       Factors impacting intake: [ ] none [ ] nausea  [ ] vomiting [ ] diarrhea [ ] constipation  [ ]chewing problems [ ] swallowing issues  [ ] other:     Diet Prescription: Diet, Regular:   No Concentrated Potassium  Low Sodium  No Concentrated Phosphorus  Supplement Feeding Modality:  Oral  Nepro Cans or Servings Per Day:  2       Frequency:  Two Times a day (06-06-23 @ 12:13)    Intake:  0- 25 % of meal.     Current Weight:   % Weight Change Bed scale 180 LB    Pertinent Medications: MEDICATIONS  (STANDING):  albuterol    90 MICROgram(s) HFA Inhaler 2 Puff(s) Inhalation every 6 hours  buPROPion XL (24-Hour) . 150 milliGRAM(s) Oral daily  cefepime   IVPB 1000 milliGRAM(s) IV Intermittent every 12 hours  cefepime   IVPB      chlorhexidine 2% Cloths 1 Application(s) Topical <User Schedule>  divalproex  milliGRAM(s) Oral two times a day  dorzolamide 2% Ophthalmic Solution 1 Drop(s) Right EYE <User Schedule>  finasteride 5 milliGRAM(s) Oral daily  heparin   Injectable 5000 Unit(s) SubCutaneous every 8 hours  levothyroxine 137 MICROGram(s) Oral daily  pantoprazole    Tablet 40 milliGRAM(s) Oral two times a day  sodium bicarbonate 1300 milliGRAM(s) Oral three times a day  sucralfate suspension 1 Gram(s) Oral four times a day  tamsulosin 0.4 milliGRAM(s) Oral at bedtime  traZODone 50 milliGRAM(s) Oral at bedtime  vitamin A &amp; D Ointment 1 Application(s) Topical at bedtime    MEDICATIONS  (PRN):  metoclopramide Injectable 5 milliGRAM(s) IV Push every 6 hours PRN nausea/vomit    Pertinent Labs: 06-06 Na142 mmol/L Glu 96 mg/dL K+ 3.5 mmol/L Cr  4.48 mg/dL<H> BUN 71 mg/dL<H> 06-06 Phos 2.8 mg/dL 06-06 Alb 1.5 g/dL<L>     CAPILLARY BLOOD GLUCOSE        Skin: Stage 2 @ R Gluteus, stage 1 @ B/L Heel, Coccyx.    Estimated Needs:   [ ] no change since previous assessment  [ ] recalculated:     Previous Nutrition Diagnosis:   [ ] Inadequate Energy Intake [x ]Inadequate Oral Intake [ ] Excessive Energy Intake   [ ] Underweight [ ] Increased Nutrient Needs [ ] Overweight/Obesity   [ ] Altered GI Function [ ] Unintended Weight Loss [ ] Food & Nutrition Related Knowledge Deficit [ ] Malnutrition     Nutrition Diagnosis is [ x] ongoing  [ ] resolved [ ] not applicable     New Nutrition Diagnosis: [ ] not applicable       Interventions:   Recommend  [ ] Change Diet To:  [x ] Nutrition Supplement Continue with Nepro BID.  (x) Appetite stimulant if medically appropriate  (x) Reglan Routine as Medically appropriate   [ ] Nutrition Support  [ ] Other:     Monitoring and Evaluation:   [ ] PO intake [ x ] Tolerance to diet prescription [ x ] weights [ x ] labs[ x ] follow up per protocol  [ ] other:

## 2023-06-07 NOTE — PROGRESS NOTE ADULT - PROBLEM SELECTOR PLAN 4
Quality 130: Documentation Of Current Medications In The Medical Record: Current Medications Documented Detail Level: Detailed Quality 137: Melanoma: Continuity Of Care - Recall System: Patient information entered into a recall system that includes: target date for the next exam specified AND a process to follow up with patients regarding missed or unscheduled appointments Quality 226: Preventive Care And Screening: Tobacco Use: Screening And Cessation Intervention: Patient screened for tobacco use and is an ex/non-smoker Quality 431: Preventive Care And Screening: Unhealthy Alcohol Use - Screening: Patient screened for unhealthy alcohol use using a single question and scores less than 2 times per year S/P EGD  +Ulcerative esophagitis  Continue PPI and Carafate /needs 2 weeks  Regular diet and tolerating well.

## 2023-06-07 NOTE — PROGRESS NOTE ADULT - ASSESSMENT
63 year old Male, from [place], AOx3, ambulates with walker , chronic FC, h/o obesity, HTN, HLD, PVD, Seizures, CKD4 (base SCr 3.8), anemia, BPH, Lymphedema, hypothyroidism, HFpEF, possible COPD, decubitus ulcer (sacrum/gluteal), polyneuropathy, polyarthritis, bipolar disorder. Patient presented to ED for abdominal pain, nausea and vomiting brown emesis. Patient admitted to ICU for Hypotension likely from Sepsis secondary to complicated UTI and esophagitis. CT head was negative for acute changes. CT abd showed circumferential wall thickening of distal esophagus. GI consulted recommending endoscopy which took place on 5/30 with findings of ulcerative esophagitis. On PPI.   Blood cultures from 5/28 + for P. aeruginasa. Ucx likely contaminant. On cefepime.  Patient with pressure injury. Wound care consult. Nephrology following for SHEYLA on CKD.   06/01: Repeat blood cx pending. Advance diet as tolerated. PT reccs TG (5/30). Plan to return to [facility] once repeat Bcx come back. C/w Cefepime for complicated UTI and P. Aeruginosa bacteriemia. Afebrile. BP soft this AM.    06/02: Repeat blood cx pending. On Cefepime. PT reccs TG. Dispo ([facility]). Hemodynamically stable. Plan for OOB to chair.   06/03: Repeat Bcx from 6/1 NGTD. Afebrile. On Cefepime. ID following. Hemodynamically stable. Plan to advance diet to full liquids.   6/4: Patient with tachycardia secondary to frustration from not being able to be discharge. Emotional support given, encouraged to verbalize concerns, explained in full the plan of care. Patient VS became stable, will hold off on EKG as isolated event.   06/07: Urinary retention with Viera insertion overnight. No acceptance from chosen facilities. Bcx NGTD. Plan to d/c on Cipro and Maxepime for a total of 14 days per ID. SCr increaased to 4.80 from 4.48. Hypokalemic this AM. replacement in progress. Titrating O2.

## 2023-06-07 NOTE — PROGRESS NOTE ADULT - SUBJECTIVE AND OBJECTIVE BOX
Patient is a 63y old  Male who presents with a chief complaint of sepsis (07 Jun 2023 14:59)    PATIENT IS SEEN AND EXAMINED IN MEDICAL FLOOR.  DORIST [    ]    ALYSIA [   ]      GT [   ]    ALLERGIES:  No Known Allergies      Daily     Daily     VITALS:    Vital Signs Last 24 Hrs  T(C): 36.9 (07 Jun 2023 21:26), Max: 36.9 (07 Jun 2023 21:26)  T(F): 98.4 (07 Jun 2023 21:26), Max: 98.4 (07 Jun 2023 21:26)  HR: 76 (07 Jun 2023 21:26) (76 - 91)  BP: 142/62 (07 Jun 2023 21:26) (95/54 - 147/57)  BP(mean): --  RR: 19 (07 Jun 2023 21:26) (18 - 19)  SpO2: 100% (07 Jun 2023 21:26) (91% - 100%)    Parameters below as of 07 Jun 2023 21:26  Patient On (Oxygen Delivery Method): nasal cannula  O2 Flow (L/min): 3      LABS:    CBC Full  -  ( 07 Jun 2023 07:06 )  WBC Count : 8.95 K/uL  RBC Count : 2.76 M/uL  Hemoglobin : 8.4 g/dL  Hematocrit : 27.2 %  Platelet Count - Automated : 171 K/uL  Mean Cell Volume : 98.6 fl  Mean Cell Hemoglobin : 30.4 pg  Mean Cell Hemoglobin Concentration : 30.9 gm/dL  Auto Neutrophil # : x  Auto Lymphocyte # : x  Auto Monocyte # : x  Auto Eosinophil # : x  Auto Basophil # : x  Auto Neutrophil % : x  Auto Lymphocyte % : x  Auto Monocyte % : x  Auto Eosinophil % : x  Auto Basophil % : x      06-07    141  |  115<H>  |  73<H>  ----------------------------<  108<H>  3.3<L>   |  17<L>  |  4.66<H>    Ca    9.4      07 Jun 2023 21:08  Phos  1.7     06-07  Mg     2.1     06-07    TPro  5.1<L>  /  Alb  1.5<L>  /  TBili  0.2  /  DBili  x   /  AST  <3<L>  /  ALT  <6<L>  /  AlkPhos  104  06-07    CAPILLARY BLOOD GLUCOSE            LIVER FUNCTIONS - ( 07 Jun 2023 07:06 )  Alb: 1.5 g/dL / Pro: 5.1 g/dL / ALK PHOS: 104 U/L / ALT: <6 U/L DA / AST: <3 U/L / GGT: x           Creatinine Trend: 4.66<--, 4.80<--, 4.48<--, 3.80<--, 3.44<--, 3.17<--  I&O's Summary    06 Jun 2023 07:01  -  07 Jun 2023 07:00  --------------------------------------------------------  IN: 0 mL / OUT: 900 mL / NET: -900 mL            .Blood Blood  06-01 @ 10:52   No Growth Final  --  --      .Blood Blood  06-01 @ 10:46   No Growth Final  --  --      .Blood Blood-Peripheral  05-28 @ 21:09   No Growth Final  --  Blood Culture PCR  Pseudomonas aeruginosa      Clean Catch Clean Catch (Midstream)  05-28 @ 21:02   >=3 organisms. Probable collection contamination.  --  --          MEDICATIONS:    MEDICATIONS  (STANDING):  albuterol    90 MICROgram(s) HFA Inhaler 2 Puff(s) Inhalation every 6 hours  buPROPion XL (24-Hour) . 150 milliGRAM(s) Oral daily  cefepime   IVPB      cefepime   IVPB 1000 milliGRAM(s) IV Intermittent every 12 hours  chlorhexidine 2% Cloths 1 Application(s) Topical <User Schedule>  dextrose 5% + sodium chloride 0.45%. 1000 milliLiter(s) (75 mL/Hr) IV Continuous <Continuous>  divalproex  milliGRAM(s) Oral two times a day  dorzolamide 2% Ophthalmic Solution 1 Drop(s) Right EYE <User Schedule>  finasteride 5 milliGRAM(s) Oral daily  heparin   Injectable 5000 Unit(s) SubCutaneous every 8 hours  levothyroxine 137 MICROGram(s) Oral daily  pantoprazole    Tablet 40 milliGRAM(s) Oral two times a day  potassium chloride   Powder 40 milliEquivalent(s) Oral once  sodium bicarbonate 1300 milliGRAM(s) Oral three times a day  sucralfate suspension 1 Gram(s) Oral four times a day  tamsulosin 0.4 milliGRAM(s) Oral at bedtime  traZODone 50 milliGRAM(s) Oral at bedtime  vitamin A &amp; D Ointment 1 Application(s) Topical at bedtime      MEDICATIONS  (PRN):  metoclopramide Injectable 5 milliGRAM(s) IV Push every 6 hours PRN nausea/vomit      REVIEW OF SYSTEMS:                           ALL ROS DONE [ X   ]    CONSTITUTIONAL:  LETHARGIC [   ], FEVER [   ], UNRESPONSIVE [   ]  CVS:  CP  [   ], SOB, [   ], PALPITATIONS [   ], DIZZYNESS [   ]  RS: COUGH [   ], SPUTUM [   ]  GI: ABDOMINAL PAIN [   ], NAUSEA [   ], VOMITINGS [   ], DIARRHEA [   ], CONSTIPATION [   ]  :  DYSURIA [   ], NOCTURIA [   ], INCREASED FREQUENCY [   ], DRIBLING [   ],  SKELETAL: PAINFUL JOINTS [   ], SWOLLEN JOINTS [   ], NECK ACHE [   ], LOW BACK ACHE [   ],  SKIN : ULCERS [   ], RASH [   ], ITCHING [   ]  CNS: HEAD ACHE [   ], DOUBLE VISION [   ], BLURRED VISION [   ], AMS / CONFUSION [   ], SEIZURES [   ], WEAKNESS [   ],TINGLING / NUMBNESS [   ]    PHYSICAL EXAMINATION:    GENERAL APPEARANCE: NO DISTRESS  HEENT:  NO PALLOR, NO  JVD,  NO   NODES, NECK SUPPLE  CVS: S1 +, S2 +,   RS: AEEB,  OCCASIONAL  RALES +,   NO RONCHI  ABD: SOFT, NT, NO, BS +  EXT: PE+  SKIN: WARM,   SKELETAL:  ROM REDUCED AT CERVICAL & LS SPINE  CNS:  AAO X 2-3      RADIOLOGY :    RADIOLOGY AND READINGS REVIEWED    ASSESSMENT :       PLAN:  HPI:  64 y/o M, A&Ox3, bedbound, chronic daley catheter with CKD (baseline creat 3.8), COPD, CHFpEF, BPH, PVD, Lymphedema, decubitus ulcers (Sacrum/gluteal) , Anemia, poly-neuropathy, poly-arthritis, hypothyroidism, seizures and Bipolar disorder was BIB EMS from [place] for abdominal pain, NV. Mr. Rhoades states that he developed acute onset sharp abdominal pain 4 days ago a/w "brown" emesis (witnessed by EMS). Unable to tolerate food/drink x 4 days. Having normal bowel movements, last this morning 5/28. Denies any fevers. NH paperwork states concern for obstruction.  Patient endorses BLOODY EMESIS x 4 days upon further questioning. (29 May 2023 01:36)      # RESOLVING SEPSIS S/T P.AERUGINOSA BACTEREMIA + COMPLICATED UTI    - ON CEFEPIME, BCX [P. AERUGINOSA] AND UCX [ > 3 ORGANISMS], REPEAT BCX - NGTD  - ID CONSULT  - CRITICAL CARE EVALUATION IN PROGRESS      # RESOLVING GI BLEED  # ESOPHAGITIS  # ANEMIA OF CHRONIC DISEASE - MONITOR HGB, TRANSFUSION THRESHOLD HGB < 8  - S/P EGD - ULCERATIVE ESOPHAGITIS  - PPI BID  - CARAFATE QID  - ADVANCING DIET PER GI RECOMMENDATION   - GI CONSULT    - [6/6] - EPISODE OF NAUSEA/VOMITING - MONITORING CLOSELY, PRN ANTIEMETICS    # ANEMIA DUE TO GI BLEED, ANEMIA OF CKD     # CHRONIC HYPOXIC RESPIRATORY FAILURE S/T UNDERLYING COPD  - ON PRN O2  - CRITICAL CARE EVALUATION IN PROGRESS    # AMS DUE TO ACUTE METABOLIC ENCEPHALOPATHY      # SHEYLA ON CKD STAGE 4   # CHRONIC DALEY, BPH  - DALEY   - STRICT IS AND OS  - AVOID NEPHROTOXIC AGENTS  - MONITOR CR  - NEPHROLOGY CONSULT    - [6/7] - URINARY RETENTION OVERNIGHT - DALEY PLACED    # AMBULATORY DYSFUNCTION, IMPAIRED GAIT DUE TO GENERALIZED MUSCLE WEAKNESS, CERVICAL & LS SPINAL STENOSIS, POLYARTRHITIS & PERIPHERAL NEUROPATHY. OP  - FALL PRECAUTIONS    # DYSPHAGIA DUE TO METABOLIC ENCEPHALOPATHY - ON MODIFIED DIET PER SWALLOW EVAL      # HX OF SEIZURE DX  - ON VALPROIC ACID    # HYPOTHYROIDISM - ON LEVOTHYROXINE    # PRESSURE ULCERS  - PATIENT IS HIGH RISK FOR PRESSURE ULCERS DESPITE PREVENTIVE MEASURES IN PLACE  - WOUND CARE CONSULT    # GI PPX

## 2023-06-07 NOTE — PROGRESS NOTE ADULT - ASSESSMENT
SHEYLA due to Hypovolemia likely. BP is low again and Cr is rising. Possible hypoperfusion but will need to rule out other causes of SHEYLA.    Bladder scan showed retention and a daley was inserted. Cr was still high, however. UA showed no evidence of ATN.  - Hydrate at 50 ml/hour.  - Follow up BMP.  - Maintain daley for now.    CKD  4  -Renal diet.  - Avoid Nephrotoxins.  - follow up BMP.    CKD Mineral and Bone Disease:  He has Hypophosphatemia now.  Off binders. Likely Renal wasting or poor GI absorption.  - Supplement PO4.  - Vit D levels.  - PTH levels should be repeated as his corrected Ca is 12.8.  May have Primary Hyperparathyroidism as this would explain his low PO4    Anemia of CKD:  - Transfuse as needed for Hg < 7.    Metabolic Acidosis:  Non AG, likely Distal RTA.  - NaHCO3 to 1300 mg PO TID.    Hypokalemia:  Likely Distal RTA. Mg is wnl.  - Monitor K and Mg.  - Supplement as needed.

## 2023-06-07 NOTE — PROGRESS NOTE ADULT - NS ATTEND AMEND GEN_ALL_CORE FT
Problem/Plan - 1:  ·  Problem: Sepsis.   ·  Plan: Secondary to Bacteremia and complicated UTI.  Blood culture positive for Gram negative rods/Pseudomonas  Ucx likely contaminant.   repeated blood cultures (6/1) NGTD  Continue Cefepime- discharge Cipro 250mgs po BID to complete a total of 14 days of all antibiotics( Maxipime and cipro )  Last fay 6/10.     Problem/Plan - 2:  ·  Problem: GIB (gastrointestinal bleeding).   ·  Plan: S/P EGD. No signs of active bleeding.  Continue Carafate and PPI.     Problem/Plan - 3:  ·  Problem: Electrolyte depletion.   ·  Plan: #hypokalemia 2.8  Replacement in progress    # hypophosphatemia   1.7 Replacement in progress    F/u BMP in PM.     Problem/Plan - 4:  ·  Problem: Esophagitis.   ·  Plan: S/P EGD  +Ulcerative esophagitis  Continue PPI and Carafate /needs 2 weeks  Regular diet and tolerating well.     Problem/Plan - 5:  ·  Problem: Seizure.   ·  Plan: Continue valproic acid 500 mg BID.   Valproic acid level less than 40 (Valproic acid for Bipolar disorder).   Maintain seizure precautions.  No s/s of seizures.

## 2023-06-07 NOTE — PROGRESS NOTE ADULT - PROBLEM SELECTOR PLAN 9
SHEYLA on CKD  stage 4. likely pre-renal- resolved SHEYLA  BP goal normotensive. Avoid hypotension.   Baseline around 3.7  Today Scr 4.80  Avoid nephrotoxic agents.  continue bicarb.

## 2023-06-07 NOTE — PROGRESS NOTE ADULT - PROBLEM SELECTOR PLAN 11
DVT and GI prophylaxis.  PT recs [facility]   CM aware   Continue attempts to get patient out of bed.  Medically stable for discharge to [facility].  No acceptance from other facilities.   [facility] only option  CM to follow

## 2023-06-07 NOTE — PROGRESS NOTE ADULT - SUBJECTIVE AND OBJECTIVE BOX
GAYLA PEARCE  63y  Patient is a 63y old  Male who presents with a chief complaint of sepsis (2023 10:26)    HPI:  Seen and examined. He had urinary retention and a daley was placed.  Complains of nausea.      HEALTH ISSUES - PROBLEM Dx:  GIB (gastrointestinal bleeding)    Sepsis    Anemia    Hypothyroid    CKD (chronic kidney disease)    BPH (benign prostatic hyperplasia)    Bipolar disorder    Advance care planning    Seizure    Esophagitis    Hypokalemia    Ambulatory dysfunction    Prophylactic measure    Electrolyte depletion          MEDICATIONS  (STANDING):  albuterol    90 MICROgram(s) HFA Inhaler 2 Puff(s) Inhalation every 6 hours  buPROPion XL (24-Hour) . 150 milliGRAM(s) Oral daily  cefepime   IVPB      cefepime   IVPB 1000 milliGRAM(s) IV Intermittent every 12 hours  chlorhexidine 2% Cloths 1 Application(s) Topical <User Schedule>  dextrose 5% + sodium chloride 0.45%. 1000 milliLiter(s) (75 mL/Hr) IV Continuous <Continuous>  divalproex  milliGRAM(s) Oral two times a day  dorzolamide 2% Ophthalmic Solution 1 Drop(s) Right EYE <User Schedule>  finasteride 5 milliGRAM(s) Oral daily  heparin   Injectable 5000 Unit(s) SubCutaneous every 8 hours  levothyroxine 137 MICROGram(s) Oral daily  pantoprazole    Tablet 40 milliGRAM(s) Oral two times a day  sodium bicarbonate 1300 milliGRAM(s) Oral three times a day  sucralfate suspension 1 Gram(s) Oral four times a day  tamsulosin 0.4 milliGRAM(s) Oral at bedtime  traZODone 50 milliGRAM(s) Oral at bedtime  vitamin A &amp; D Ointment 1 Application(s) Topical at bedtime    MEDICATIONS  (PRN):  metoclopramide Injectable 5 milliGRAM(s) IV Push every 6 hours PRN nausea/vomit    Vital Signs Last 24 Hrs  T(C): 36.6 (2023 13:30), Max: 36.6 (2023 13:30)  T(F): 97.8 (2023 13:30), Max: 97.8 (2023 13:30)  HR: 91 (2023 13:30) (76 - 93)  BP: 109/76 (2023 13:30) (93/43 - 147/57)  BP(mean): 54 (2023 21:10) (54 - 54)  RR: 18 (2023 13:30) (18 - 19)  SpO2: 100% (2023 13:30) (91% - 100%)    Parameters below as of 2023 13:30  Patient On (Oxygen Delivery Method): nasal cannula  O2 Flow (L/min): 5    Daily     Daily     PHYSICAL EXAM:  Constitutional:  He appears comfortable and not distressed. Not diaphoretic.    Neck:  The thyroid is normal. Trachea is midline.     Respiratory: The lungs are clear to auscultation. No dullness and expansion is normal.    Cardiovascular: S1 and S2 are normal. No murmurs rubs or gallops are present.    Gastrointestinal: The abdomen is soft. No tenderness is present. No masses are present.     Genitourinary: The bladder is not distended. No CVA tenderness is present. Daley in place.    Extremities: No edema is noted. No deformities are present.    Neurological: Cognition is normal. Tone, power and sensation are normal.     Skin: No lesions are seen  or palpated.    Lymph Nodes: No lymphadenopathy is present.    Psychiatric: Mood is appropriate. No hallucinations or flight of ideas are noted.                              8.4    8.95  )-----------( 171      ( 2023 07:06 )             27.2     06-07    141  |  114<H>  |  72<H>  ----------------------------<  126<H>  2.8<LL>   |  16<L>  |  4.80<H>    Ca    9.8      2023 07:06  Phos  1.7     06-07  Mg     2.1     06-07  Intact PTH: 33 MG/DL PG/ML (23 @ 06:53)   Intact PTH: 402 pg/mL (22 @ 11:45)   Intact PTH: 330 MG/DL PG/ML  pg/mL (21 @ 10:19)     TPro  5.1<L>  /  Alb  1.5<L>  /  TBili  0.2  /  DBili  x   /  AST  <3<L>  /  ALT  <6<L>  /  AlkPhos  104  06-07    Magnesium, Serum: 2.1 mg/dL (23 @ 07:06)   Magnesium, Serum: 2.1 mg/dL (23 @ 07:40)     Phosphorus Level, Serum: 1.7 mg/dL (23 @ 07:06)   Phosphorus Level, Serum: 2.8 mg/dL (23 @ 07:40)   Phosphorus Level, Serum: 2.5 mg/dL (23 @ 07:24)   Sodium, Random Urine: 63    Urinalysis Basic - ( 2023 22:00 )    Color: Yellow / Appearance: Clear / S.010 / pH: x  Gluc: x / Ketone: Negative  / Bili: Negative / Urobili: Negative   Blood: x / Protein: 30 mg/dL / Nitrite: Negative   Leuk Esterase: Small / RBC: 2-5 /HPF / WBC 11-25 /HPF   Sq Epi: x / Non Sq Epi: x / Bacteria: Few /HPF

## 2023-06-07 NOTE — PROGRESS NOTE ADULT - PROBLEM SELECTOR PLAN 8
Continue finasteride and tamsulosin.  Urinary retention overnight (900ml)  Viera present.  Monitor UO

## 2023-06-07 NOTE — SWALLOW BEDSIDE ASSESSMENT ADULT - COMMENTS
HOB elevated to 90°. AA+Ox2-3; pt awake, responsive to all SLP queries and follows all directions. Pt reports nausea and no solid PO intake "for a long time". As per RN, pt drank orange juice only from breakfast tray. Pt refused all solid PO trials ("want to vomit"), denied globus sensation and odynophagia.

## 2023-06-07 NOTE — PROGRESS NOTE ADULT - SUBJECTIVE AND OBJECTIVE BOX
GAYLA PEARCE    SCU progress note    INTERVAL HPI/OVERNIGHT EVENTS: Urinary retention overnight.     FULL CODE.     Covid - 19 PCR: 2023 non-reactive.     The 4Ms    What Matters Most: see GOC  Age appropriate Medications/Screen for High Risk Medication: Yes  Mentation: see CAM below  Mobility: defer to physical exam    The Confusion Assessment Method (CAM) Diagnostic Algorithm     1: Acute Onset or Fluctuating Course  - Is there evidence of an acute change in mental status from the patient’s baseline? Did the (abnormal) behavior  fluctuate during the day, that is, tend to come and go, or increase and decrease in severity?  [ ] YES [ x] NO     2: Inattention  - Did the patient have difficulty focusing attention, being easily distractible, or having difficulty keeping track of what was being said?  [ ] YES [x ] NO     3: Disorganized thinking  -Was the patient’s thinking disorganized or incoherent, such as rambling or irrelevant conversation, unclear or illogical flow of ideas, or unpredictable switching from subject to subject?  [ ] YES [x ] NO    4: Altered Level of consciousness?  [ ] YES [x ] NO    The diagnosis of delirium by CAM requires the presence of features 1 and 2 and either 3 or 4.    PRESSORS: [ ] YES [x ] NO  cefepime   IVPB      cefepime   IVPB 1000 milliGRAM(s) IV Intermittent every 12 hours    Cardiovascular:  Heart Failure  Acute   Acute on Chronic  Chronic         Pulmonary:  albuterol    90 MICROgram(s) HFA Inhaler 2 Puff(s) Inhalation every 6 hours    Hematalogic:  heparin   Injectable 5000 Unit(s) SubCutaneous every 8 hours    Other:  buPROPion XL (24-Hour) . 150 milliGRAM(s) Oral daily  chlorhexidine 2% Cloths 1 Application(s) Topical <User Schedule>  dextrose 5% + sodium chloride 0.45%. 1000 milliLiter(s) IV Continuous <Continuous>  divalproex  milliGRAM(s) Oral two times a day  dorzolamide 2% Ophthalmic Solution 1 Drop(s) Right EYE <User Schedule>  finasteride 5 milliGRAM(s) Oral daily  levothyroxine 137 MICROGram(s) Oral daily  metoclopramide Injectable 5 milliGRAM(s) IV Push every 6 hours PRN  pantoprazole    Tablet 40 milliGRAM(s) Oral two times a day  potassium phosphate IVPB 15 milliMole(s) IV Intermittent once  sodium bicarbonate 1300 milliGRAM(s) Oral three times a day  sucralfate suspension 1 Gram(s) Oral four times a day  tamsulosin 0.4 milliGRAM(s) Oral at bedtime  traZODone 50 milliGRAM(s) Oral at bedtime  vitamin A &amp; D Ointment 1 Application(s) Topical at bedtime    albuterol    90 MICROgram(s) HFA Inhaler 2 Puff(s) Inhalation every 6 hours  buPROPion XL (24-Hour) . 150 milliGRAM(s) Oral daily  cefepime   IVPB 1000 milliGRAM(s) IV Intermittent every 12 hours  cefepime   IVPB      chlorhexidine 2% Cloths 1 Application(s) Topical <User Schedule>  dextrose 5% + sodium chloride 0.45%. 1000 milliLiter(s) IV Continuous <Continuous>  divalproex  milliGRAM(s) Oral two times a day  dorzolamide 2% Ophthalmic Solution 1 Drop(s) Right EYE <User Schedule>  finasteride 5 milliGRAM(s) Oral daily  heparin   Injectable 5000 Unit(s) SubCutaneous every 8 hours  levothyroxine 137 MICROGram(s) Oral daily  metoclopramide Injectable 5 milliGRAM(s) IV Push every 6 hours PRN  pantoprazole    Tablet 40 milliGRAM(s) Oral two times a day  potassium phosphate IVPB 15 milliMole(s) IV Intermittent once  sodium bicarbonate 1300 milliGRAM(s) Oral three times a day  sucralfate suspension 1 Gram(s) Oral four times a day  tamsulosin 0.4 milliGRAM(s) Oral at bedtime  traZODone 50 milliGRAM(s) Oral at bedtime  vitamin A &amp; D Ointment 1 Application(s) Topical at bedtime    Drug Dosing Weight  Height (cm): 172.7 (28 May 2023 19:50)  Weight (kg): 83.9 (28 May 2023 19:50)  BMI (kg/m2): 28.1 (28 May 2023 19:50)  BSA (m2): 1.98 (28 May 2023 19:50)    CENTRAL LINE: [ ] YES [x ] NO  LOCATION:   DATE INSERTED:  REMOVE: [ ] YES [ ] NO  EXPLAIN:    HATFIELD: [x ] YES [ ] NO    DATE INSERTED:  REMOVE:  [ ] YES [ ] NO  EXPLAIN:    PAST MEDICAL & SURGICAL HISTORY:  Hypothyroid      Hyperlipidemia      Ambulatory dysfunction      Anemia      History of BPH      CKD (chronic kidney disease)      Chronic obstructive pulmonary disease (COPD)      Bipolar disorder      HLD (hyperlipidemia)      BPH (benign prostatic hyperplasia)      Chronic diastolic congestive heart failure      Lymphedema      Chronic leg pain      No significant past surgical history     @ 07:01  -   @ 07:00  --------------------------------------------------------  IN: 0 mL / OUT: 900 mL / NET: -900 mL      PHYSICAL EXAM:    GENERAL: NAD, well-groomed, well-developed  HEAD:  Atraumatic, Normocephalic  EYES: EOMI, PERRLA, conjunctiva and sclera clear  ENMT: Moist mucous membranes  NECK: Supple, No JVD  NERVOUS SYSTEM:  Alert & Oriented X3. Moves exts spontaneously. Generalized weakness.   CHEST/LUNG: Diminished at the bases bilaterally; No rales, rhonchi, wheezing, or rubs. On 3L NC.   HEART: Regular rate and rhythm; No murmurs, rubs, or gallops  ABDOMEN: Soft, Nontender, Nondistended; Bowel sounds present  : Hatfield in place.   EXTREMITIES: 1+ Peripheral Pulses, No clubbing, cyanosis, +1 pitting edema lower exts.   SKIN: DTI sacrum. Discoloration of lower exts lidia.       LABS:  CBC Full  -  ( 2023 07:06 )  WBC Count : 8.95 K/uL  RBC Count : 2.76 M/uL  Hemoglobin : 8.4 g/dL  Hematocrit : 27.2 %  Platelet Count - Automated : 171 K/uL  Mean Cell Volume : 98.6 fl  Mean Cell Hemoglobin : 30.4 pg  Mean Cell Hemoglobin Concentration : 30.9 gm/dL  Auto Neutrophil # : x  Auto Lymphocyte # : x  Auto Monocyte # : x  Auto Eosinophil # : x  Auto Basophil # : x  Auto Neutrophil % : x  Auto Lymphocyte % : x  Auto Monocyte % : x  Auto Eosinophil % : x  Auto Basophil % : x        141  |  114<H>  |  72<H>  ----------------------------<  126<H>  2.8<LL>   |  16<L>  |  4.80<H>    Ca    9.8      2023 07:06  Phos  1.7     06-  Mg     2.1     06-07    TPro  5.1<L>  /  Alb  1.5<L>  /  TBili  0.2  /  DBili  x   /  AST  <3<L>  /  ALT  <6<L>  /  AlkPhos  104  06-07      Urinalysis Basic - ( 2023 22:00 )    Color: Yellow / Appearance: Clear / S.010 / pH: x  Gluc: x / Ketone: Negative  / Bili: Negative / Urobili: Negative   Blood: x / Protein: 30 mg/dL / Nitrite: Negative   Leuk Esterase: Small / RBC: 2-5 /HPF / WBC 11-25 /HPF   Sq Epi: x / Non Sq Epi: x / Bacteria: Few /HPF      [ x ]  DVT Prophylaxis  [ x ]  Nutrition: Regular diet          RADIOLOGY & ADDITIONAL STUDIES:      < from: CT Abdomen and Pelvis No Cont (23 @ 22:34) >  ACC: [id number] EXAM:  CT ABDOMEN AND PELVIS   ORDERED BY: GERHARD REDDY     PROCEDURE DATE:  2023          INTERPRETATION:  CLINICAL INFORMATION: Abdominal pain and vomiting. Rule   out obstruction.    COMPARISON: CT of the abdomen and pelvis from 2022.    CONTRAST/COMPLICATIONS:  IV Contrast: NONE  Evaluation of the visceral organs is limited without   intravenous contrast  Oral Contrast: NONE  Complications: None reported at time of study completion    PROCEDURE:  CT of the Abdomen and Pelvis was performed.  Sagittal and coronal reformats were performed.    FINDINGS:  LOWER CHEST: Bibasilar subsegmental atelectasis. Circumferential wall   thickening of the distal esophagus.    LIVER: Within normal limits.  BILE DUCTS: Normal caliber.  GALLBLADDER: Cholecystectomy.  SPLEEN: Within normal limits.  PANCREAS: Within normal limits.  ADRENALS: Within normal limits.  KIDNEYS/URETERS: Redemonstrated moderate nonspecific bilateral   perinephric fat stranding. No hydronephrosis.    BLADDER: Collapsed around a Hatfield catheter balloon. Intravesicular gas   secondary to instrumentation. Circumferential wall thickening.  REPRODUCTIVE ORGANS: Prostate within normal limits.    BOWEL: No bowel obstruction or inflammation. Scattered colonic   diverticulosis without diverticulitis. Appendix is normal.  PERITONEUM: No ascites.  VESSELS: Infrarenal IVC filter.  RETROPERITONEUM/LYMPH NODES: No lymphadenopathy.  ABDOMINAL WALL: Small bilateral fat-containing inguinal hernias.  BONES: Old right rib fractures. Severe right hip degenerative change with   remodeling of the right femoral head. Degenerative changes of the spine.    IMPRESSION:  1. No bowel obstruction or inflammation.  2. Circumferential wall thickening of the distal esophagus which could be   due to an esophagitis.  3. Circumferential urinary bladder wall thickening which could be due to   underdistention versus a cystitis. Correlate with urinalysis.        --- End of Report ---            GERHARD HAGAN MD; Attending Radiologist  This document has been electronically signed. May 28 2023 11:30PM    < end of copied text >  < from: CT Head No Cont (23 @ 22:33) >  ACC: [id number] EXAM:  CT BRAIN   ORDERED BY: GERHARD REDDY     PROCEDURE DATE:  2023          INTERPRETATION:  CLINICAL INDICATION: Altered mental status, vomiting.    TECHNIQUE:  Noncontrast CT of the head was performed.  Sagittal and coronal reformats were created.    COMPARISON STUDY: 2022    FINDINGS:    PARENCHYMA AND VENTRICLES: No acute intracranial hemorrhage, mass effect,   or midline shift. No hydrocephalus. Prominence of the sulci and   ventricles, consistent with age-related parenchymal volume loss; the   extent of ventricular and sulcal prominence appears somewhat increased   compared to 2022. Small vessel white matter changes.  EXTRA-AXIAL: No abnormal extraaxial collection.  PARANASAL SINUSES: Layering fluid in the right sphenoid sinus and   additional mild scattered mucosal thickening, as on prior.  TYMPANOMASTOID CAVITIES: Within normal limits.  ORBITS: Bilateral cataract surgery.  CALVARIUM: Within normal limits.  MISCELLANEOUS: Intracranial atherosclerosis.    IMPRESSION:  No acute intracranial hemorrhage, mass effect, or midline shift.  Paranasal sinus mucosal disease.  The extent of ventricular and sulcal prominence appears increased   compared with 2022, indeterminate clinical significance.    --- End of Report ---         UMM SIBLEY MD; Attending Radiologist  This document has been electronically signed. May 28 2023 11:22PM    < end of copied text >      Plan of care discussed with patient and intensivist.

## 2023-06-07 NOTE — PROGRESS NOTE ADULT - PROBLEM SELECTOR PLAN 3
#hypokalemia 2.8  Replacement in progress    # hypophosphatemia   1.7 Replacement in progress    F/u BMP in PM.

## 2023-06-08 NOTE — PROGRESS NOTE ADULT - NS ATTEND AMEND GEN_ALL_CORE FT
Problem/Plan - 1:  ·  Problem: Sepsis.   ·  Plan: Secondary to Bacteremia and complicated UTI.  Blood culture positive for Gram negative rods/Pseudomonas  Ucx likely contaminant.   repeated blood cultures (6/1) NGTD  Continue Cefepime- discharge Cipro 250mgs po BID to complete a total of 14 days of all antibiotics( Maxipime and cipro )  Last fay 6/10.  This AM spike in WBC. Afebrile.   C/o SOB. CXR and BNP F/u  Soft BP on 06/08 but today wnl.     Problem/Plan - 2:  ·  Problem: GIB (gastrointestinal bleeding).   ·  Plan: S/P EGD. No signs of active bleeding.  Continue Carafate and PPI.  H&H stable.     Problem/Plan - 3:  ·  Problem: Electrolyte depletion.   ·  Plan: #hypokalemia resolved    # hypophosphatemia   1.7 Replacement in progress    F/u BMP, Mag and Phosp in AM.     Problem/Plan - 4:  ·  Problem: Esophagitis.   ·  Plan: S/P EGD  +Ulcerative esophagitis  Continue PPI and Carafate /needs 2 weeks  Regular diet and tolerating well.  Patient refusing to eat and refusing to participate in speech therapy assessment.

## 2023-06-08 NOTE — PROGRESS NOTE ADULT - PROBLEM SELECTOR PLAN 9
SHEYLA on CKD  stage 4. likely pre-renal  Nephro on board.   BP goal normotensive. Avoid hypotension.   Baseline around 3.7  Today Scr 4.82  Avoid nephrotoxic agents.  continue bicarb.

## 2023-06-08 NOTE — BH CONSULTATION LIAISON ASSESSMENT NOTE - NSBHCHARTREVIEWLAB_PSY_A_CORE FT
CBC Full  -  ( 08 Jun 2023 05:47 )  WBC Count : 12.56 K/uL  RBC Count : 2.59 M/uL  Hemoglobin : 8.0 g/dL  Hematocrit : 25.4 %  Platelet Count - Automated : 130 K/uL  Mean Cell Volume : 98.1 fl  Mean Cell Hemoglobin : 30.9 pg  Mean Cell Hemoglobin Concentration : 31.5 gm/dL  Auto Neutrophil # : x  Auto Lymphocyte # : x  Auto Monocyte # : x  Auto Eosinophil # : x  Auto Basophil # : x  Auto Neutrophil % : x  Auto Lymphocyte % : x  Auto Monocyte % : x  Auto Eosinophil % : x  Auto Basophil % : x  06-08    140  |  114<H>  |  71<H>  ----------------------------<  115<H>  3.7   |  15<L>  |  4.82<H>    Ca    9.5      08 Jun 2023 05:47  Phos  2.4     06-08  Mg     1.9     06-08    TPro  4.9<L>  /  Alb  1.3<L>  /  TBili  0.2  /  DBili  x   /  AST  4<L>  /  ALT  <6<L>  /  AlkPhos  86  06-08

## 2023-06-08 NOTE — BH CONSULTATION LIAISON ASSESSMENT NOTE - NSBHCHARTREVIEWVS_PSY_A_CORE FT
Vital Signs Last 24 Hrs  T(C): 36.4 (08 Jun 2023 14:15), Max: 36.9 (07 Jun 2023 21:26)  T(F): 97.5 (08 Jun 2023 14:15), Max: 98.4 (07 Jun 2023 21:26)  HR: 89 (08 Jun 2023 14:15) (70 - 90)  BP: 135/41 (08 Jun 2023 14:15) (81/50 - 142/62)  BP(mean): --  RR: 18 (08 Jun 2023 14:15) (18 - 19)  SpO2: 100% (08 Jun 2023 14:15) (100% - 100%)    Parameters below as of 08 Jun 2023 14:15    O2 Flow (L/min): 2

## 2023-06-08 NOTE — BH CONSULTATION LIAISON ASSESSMENT NOTE - HPI (INCLUDE ILLNESS QUALITY, SEVERITY, DURATION, TIMING, CONTEXT, MODIFYING FACTORS, ASSOCIATED SIGNS AND SYMPTOMS)
64 y/o M with a hx of CKD, COPD, CHF, BPH, PVD, lymphedema, anemia, decubitus ulcers, poly-neuropathy, hypothyroidism, and Bipolar disorder, who is admitted due to abdominal pain. He is consulted due to agitation. As per staff, he has been getting verbally agitated when PT tried to evaluate him and during mid-mornings and early evening. Patient was found awake, alert, and oriented to person, place and partially to time. The evaluation was limited, but he denies any episodes of agitation. He denied feeling confused. He denied any SI, HI or AVH.

## 2023-06-08 NOTE — PROGRESS NOTE ADULT - PROBLEM SELECTOR PLAN 1
Secondary to Bacteremia and complicated UTI.  Blood culture positive for Gram negative rods/Pseudomonas  Ucx likely contaminant.   repeated blood cultures (6/1) NGTD  Continue Cefepime- discharge Cipro 250mgs po BID to complete a total of 14 days of all antibiotics( Maxipime and cipro )  Last fay 6/10.  This AM spike in WBC. Afebrile.   C/o SOB. CXR and BNP F/u  Soft BP on 06/08 but today wnl.

## 2023-06-08 NOTE — PROGRESS NOTE ADULT - ASSESSMENT
63 year old Male, from [place], AOx3, ambulates with walker , chronic FC, h/o obesity, HTN, HLD, PVD, Seizures, CKD4 (base SCr 3.8), anemia, BPH, Lymphedema, hypothyroidism, HFpEF, possible COPD, decubitus ulcer (sacrum/gluteal), polyneuropathy, polyarthritis, bipolar disorder. Patient presented to ED for abdominal pain, nausea and vomiting brown emesis. Patient admitted to ICU for Hypotension likely from Sepsis secondary to complicated UTI and esophagitis. CT head was negative for acute changes. CT abd showed circumferential wall thickening of distal esophagus. GI consulted recommending endoscopy which took place on 5/30 with findings of ulcerative esophagitis. On PPI.   Blood cultures from 5/28 + for P. aeruginasa. Ucx likely contaminant. On cefepime.  Patient with pressure injury. Wound care consult. Nephrology following for SHEYLA on CKD.   06/01: Repeat blood cx pending. Advance diet as tolerated. PT reccs TG (5/30). Plan to return to [place] once repeat Bcx come back. C/w Cefepime for complicated UTI and P. Aeruginosa bacteriemia. Afebrile. BP soft this AM.    06/02: Repeat blood cx pending. On Cefepime. PT reccs TG. Dispo ([place]). Hemodynamically stable. Plan for OOB to chair.   06/03: Repeat Bcx from 6/1 NGTD. Afebrile. On Cefepime. ID following. Hemodynamically stable. Plan to advance diet to full liquids.   6/4: Patient with tachycardia secondary to frustration from not being able to be discharge. Emotional support given, encouraged to verbalize concerns, explained in full the plan of care. Patient VS became stable, will hold off on EKG as isolated event.   06/07: Urinary retention with Viera insertion overnight. No acceptance from chosen facilities. Bcx NGTD. Plan to d/c on Cipro and Maxepime for a total of 14 days per ID. SCr increased to 4.80 from 4.48. Hypokalemic this AM. Replacement in progress. Titrating O2.   06/08: Costa in WBC. Afebrile. Soft BP yesterday with SBP in the 90s.  C/o SOB this AM. See assessment above. CXR ordered and BNP. Patient refuses to participate in PT. Will attempt again today. Plan to D/C to [facility] when optimized.

## 2023-06-08 NOTE — BH CONSULTATION LIAISON ASSESSMENT NOTE - NSICDXBHSECONDARYDX_PSY_ALL_CORE
GIB (gastrointestinal bleeding)   K92.2  Sepsis   A41.9  Anemia   D64.9  Hypothyroid   E03.9  CKD (chronic kidney disease)   N18.9  BPH (benign prostatic hyperplasia)   N40.0  Bipolar disorder   F31.9  Advance care planning   Z71.89  Seizure   R56.9  Esophagitis   K20.90  Hypokalemia   E87.6  Ambulatory dysfunction   R26.2  Prophylactic measure   Z29.9  Electrolyte depletion   E87.8

## 2023-06-08 NOTE — PROGRESS NOTE ADULT - ASSESSMENT
SHEYLA: Cr is rising again. Bladder scan showed retention and a daley was inserted. Cr is still high, however. UA showed no evidence of ATN.  - Follow up BMP.  - Maintain daley for now.  - HD if no improvement.    CKD  4  -Renal diet.  - Avoid Nephrotoxins.  - follow up BMP.    CKD Mineral and Bone Disease:  He has Hypophosphatemia now.  Off binders. Likely Renal wasting or poor GI absorption.  - Supplement PO4.  - Vit D levels.  - PTH levels should be repeated as his corrected Ca is 12.8.  May have Primary Hyperparathyroidism as this would explain his low PO4    Anemia of CKD:  - Transfuse as needed for Hg < 7.    Metabolic Acidosis:  Non AG, likely Distal RTA.  - NaHCO3 to 1300 mg PO TID.    Hypokalemia:  Likely Distal RTA. Mg is wnl.  - Monitor K and Mg.  - Supplement as needed.

## 2023-06-08 NOTE — PROGRESS NOTE ADULT - SUBJECTIVE AND OBJECTIVE BOX
Patient is a 63y old  Male who presents with a chief complaint of sepsis (08 Jun 2023 13:10)    PATIENT IS SEEN AND EXAMINED IN MEDICAL FLOOR.    ALLERGIES:  No Known Allergies    VITALS:    Vital Signs Last 24 Hrs  T(C): 36.3 (08 Jun 2023 21:08), Max: 36.4 (08 Jun 2023 04:50)  T(F): 97.4 (08 Jun 2023 21:08), Max: 97.5 (08 Jun 2023 04:50)  HR: 78 (08 Jun 2023 21:08) (70 - 90)  BP: 148/98 (08 Jun 2023 21:08) (81/50 - 148/98)  BP(mean): --  RR: 17 (08 Jun 2023 21:08) (17 - 19)  SpO2: 100% (08 Jun 2023 21:08) (100% - 100%)    Parameters below as of 08 Jun 2023 21:08  Patient On (Oxygen Delivery Method): nasal cannula  O2 Flow (L/min): 2      LABS:    CBC Full  -  ( 08 Jun 2023 05:47 )  WBC Count : 12.56 K/uL  RBC Count : 2.59 M/uL  Hemoglobin : 8.0 g/dL  Hematocrit : 25.4 %  Platelet Count - Automated : 130 K/uL  Mean Cell Volume : 98.1 fl  Mean Cell Hemoglobin : 30.9 pg  Mean Cell Hemoglobin Concentration : 31.5 gm/dL  Auto Neutrophil # : x  Auto Lymphocyte # : x  Auto Monocyte # : x  Auto Eosinophil # : x  Auto Basophil # : x  Auto Neutrophil % : x  Auto Lymphocyte % : x  Auto Monocyte % : x  Auto Eosinophil % : x  Auto Basophil % : x      06-08    140  |  114<H>  |  71<H>  ----------------------------<  115<H>  3.7   |  15<L>  |  4.82<H>    Ca    9.5      08 Jun 2023 05:47  Phos  2.4     06-08  Mg     1.9     06-08    TPro  4.9<L>  /  Alb  1.3<L>  /  TBili  0.2  /  DBili  x   /  AST  4<L>  /  ALT  <6<L>  /  AlkPhos  86  06-08    CAPILLARY BLOOD GLUCOSE      POCT Blood Glucose.: 167 mg/dL (08 Jun 2023 13:54)        LIVER FUNCTIONS - ( 08 Jun 2023 05:47 )  Alb: 1.3 g/dL / Pro: 4.9 g/dL / ALK PHOS: 86 U/L / ALT: <6 U/L DA / AST: 4 U/L / GGT: x           Creatinine Trend: 4.82<--, 4.66<--, 4.80<--, 4.48<--, 3.80<--, 3.44<--  I&O's Summary    07 Jun 2023 07:01  -  08 Jun 2023 07:00  --------------------------------------------------------  IN: 0 mL / OUT: 500 mL / NET: -500 mL            .Blood Blood  06-01 @ 10:52   No Growth Final  --  --      .Blood Blood  06-01 @ 10:46   No Growth Final  --  --      .Blood Blood-Peripheral  05-28 @ 21:09   No Growth Final  --  Blood Culture PCR  Pseudomonas aeruginosa      Clean Catch Clean Catch (Midstream)  05-28 @ 21:02   >=3 organisms. Probable collection contamination.  --  --          MEDICATIONS:    MEDICATIONS  (STANDING):  albuterol    90 MICROgram(s) HFA Inhaler 2 Puff(s) Inhalation every 6 hours  buPROPion XL (24-Hour) . 150 milliGRAM(s) Oral daily  cefepime   IVPB 1000 milliGRAM(s) IV Intermittent every 12 hours  cefepime   IVPB      chlorhexidine 2% Cloths 1 Application(s) Topical <User Schedule>  dextrose 5% + sodium chloride 0.45%. 1000 milliLiter(s) (75 mL/Hr) IV Continuous <Continuous>  divalproex  milliGRAM(s) Oral two times a day  dorzolamide 2% Ophthalmic Solution 1 Drop(s) Right EYE <User Schedule>  finasteride 5 milliGRAM(s) Oral daily  levothyroxine 137 MICROGram(s) Oral daily  pantoprazole    Tablet 40 milliGRAM(s) Oral two times a day  potassium chloride   Powder 40 milliEquivalent(s) Oral once  sodium bicarbonate 1300 milliGRAM(s) Oral three times a day  sucralfate suspension 1 Gram(s) Oral four times a day  tamsulosin 0.4 milliGRAM(s) Oral at bedtime  traZODone 50 milliGRAM(s) Oral at bedtime  vitamin A &amp; D Ointment 1 Application(s) Topical at bedtime      MEDICATIONS  (PRN):  haloperidol    Injectable 1 milliGRAM(s) IntraMuscular every 6 hours PRN Agitation      REVIEW OF SYSTEMS:                           ALL ROS DONE [ X   ]    CONSTITUTIONAL:  LETHARGIC [   ], FEVER [   ], UNRESPONSIVE [   ]  CVS:  CP  [   ], SOB, [   ], PALPITATIONS [   ], DIZZYNESS [   ]  RS: COUGH [   ], SPUTUM [   ]  GI: ABDOMINAL PAIN [   ], NAUSEA [   ], VOMITINGS [   ], DIARRHEA [   ], CONSTIPATION [   ]  :  DYSURIA [   ], NOCTURIA [   ], INCREASED FREQUENCY [   ], DRIBLING [   ],  SKELETAL: PAINFUL JOINTS [   ], SWOLLEN JOINTS [   ], NECK ACHE [   ], LOW BACK ACHE [   ],  SKIN : ULCERS [   ], RASH [   ], ITCHING [   ]  CNS: HEAD ACHE [   ], DOUBLE VISION [   ], BLURRED VISION [   ], AMS / CONFUSION [   ], SEIZURES [   ], WEAKNESS [   ],TINGLING / NUMBNESS [   ]    PHYSICAL EXAMINATION:    GENERAL APPEARANCE: NO DISTRESS  HEENT:  NO PALLOR, NO  JVD,  NO   NODES, NECK SUPPLE  CVS: S1 +, S2 +,   RS: AEEB,  OCCASIONAL  RALES +,   NO RONCHI  ABD: SOFT, NT, NO, BS +  EXT: PE+  SKIN: WARM,   SKELETAL:  ROM REDUCED AT CERVICAL & LS SPINE  CNS:  AAO X 2-3      RADIOLOGY :    RADIOLOGY AND READINGS REVIEWED    ASSESSMENT :       PLAN:  HPI:  64 y/o M, A&Ox3, bedbound, chronic daley catheter with CKD (baseline creat 3.8), COPD, CHFpEF, BPH, PVD, Lymphedema, decubitus ulcers (Sacrum/gluteal) , Anemia, poly-neuropathy, poly-arthritis, hypothyroidism, seizures and Bipolar disorder was BIB EMS from [facility] for abdominal pain, NV. Mr. Rhoades states that he developed acute onset sharp abdominal pain 4 days ago a/w "brown" emesis (witnessed by EMS). Unable to tolerate food/drink x 4 days. Having normal bowel movements, last this morning 5/28. Denies any fevers. NH paperwork states concern for obstruction.  Patient endorses BLOODY EMESIS x 4 days upon further questioning. (29 May 2023 01:36)      # RESOLVING SEPSIS S/T P.AERUGINOSA BACTEREMIA + COMPLICATED UTI    - ON CEFEPIME, BCX [P. AERUGINOSA] AND UCX [ > 3 ORGANISMS], REPEAT BCX - NGTD  - ID CONSULT  - CRITICAL CARE EVALUATION IN PROGRESS      # RESOLVING GI BLEED  # ESOPHAGITIS  # ANEMIA OF CHRONIC DISEASE - MONITOR HGB, TRANSFUSION THRESHOLD HGB < 8  - S/P EGD - ULCERATIVE ESOPHAGITIS  - PPI BID  - CARAFATE QID  - ADVANCING DIET PER GI RECOMMENDATION   - GI CONSULT    - [6/6] - EPISODE OF NAUSEA/VOMITING - MONITORING CLOSELY, PRN ANTIEMETICS    # ANEMIA DUE TO GI BLEED, ANEMIA OF CKD     # CHRONIC HYPOXIC RESPIRATORY FAILURE S/T UNDERLYING COPD  - ON PRN O2  - CRITICAL CARE EVALUATION IN PROGRESS    # AMS DUE TO ACUTE METABOLIC ENCEPHALOPATHY    # SHEYLA ON CKD STAGE 4   # CHRONIC DALEY, BPH  - DALEY   - STRICT IS AND OS  - AVOID NEPHROTOXIC AGENTS  - MONITOR CR  - NEPHROLOGY CONSULT    - [6/7] - URINARY RETENTION OVERNIGHT - DALEY PLACED    # AMBULATORY DYSFUNCTION, IMPAIRED GAIT DUE TO GENERALIZED MUSCLE WEAKNESS, CERVICAL & LS SPINAL STENOSIS, POLYARTRHITIS & PERIPHERAL NEUROPATHY. OP  - FALL PRECAUTIONS    # DYSPHAGIA DUE TO METABOLIC ENCEPHALOPATHY - ON MODIFIED DIET PER SWALLOW EVAL      # HX OF SEIZURE DX  - ON VALPROIC ACID    # HYPOTHYROIDISM - ON LEVOTHYROXINE    # PRESSURE ULCERS  - PATIENT IS HIGH RISK FOR PRESSURE ULCERS DESPITE PREVENTIVE MEASURES IN PLACE  - WOUND CARE CONSULT    # GI PPX

## 2023-06-08 NOTE — BH CONSULTATION LIAISON ASSESSMENT NOTE - NSBHCHARTREVIEWINVESTIGATE_PSY_A_CORE FT
< from: 12 Lead ECG (06.07.23 @ 12:32) >    Ventricular Rate 93 BPM    Atrial Rate 93 BPM    P-R Interval 160 ms    QRS Duration 144 ms    Q-T Interval 404 ms    QTC Calculation(Bazett) 502 ms    P Axis 36 degrees    R Axis -61 degrees    T Axis 38 degrees    Diagnosis Line *** Poor data quality, interpretation may be adversely affected  Sinus rhythm with occasional Premature ventricular complexes  Right bundle branch block  Left anterior fascicular block  *** Bifascicular block ***  Septal infarct , age undetermined  Possible Lateral infarct , age undetermined  Abnormal ECG    Confirmed by SAVANNAH KINNEY, LU ([id number]) on 6/8/2023 12:56:39 PM    < end of copied text >

## 2023-06-08 NOTE — PROGRESS NOTE ADULT - PROBLEM SELECTOR PLAN 11
DVT and GI prophylaxis.  PT recs TG.   Patient refusing to participate in physical therapy.   Continue attempts to get patient out of bed.  No acceptance from other facilities.   [facility] only option.   CM to follow  Patient have spike in WBC this AM and w/u being done.

## 2023-06-08 NOTE — PROGRESS NOTE ADULT - PROBLEM SELECTOR PLAN 3
#hypokalemia resolved    # hypophosphatemia   1.7 Replacement in progress    F/u BMP, Mag and Phosp in AM

## 2023-06-08 NOTE — PROGRESS NOTE ADULT - SUBJECTIVE AND OBJECTIVE BOX
GAYLA PEARCE  63y  Patient is a 63y old  Male who presents with a chief complaint of sepsis (2023 08:50)    HPI:  Seen and examined. No new complaints. Followed for SHEYLA on CKD.  He had retention and is passing clear urine now.    HEALTH ISSUES - PROBLEM Dx:  GIB (gastrointestinal bleeding)    Sepsis    Anemia    Hypothyroid    CKD (chronic kidney disease)    BPH (benign prostatic hyperplasia)    Bipolar disorder    Advance care planning    Seizure    Esophagitis    Hypokalemia    Ambulatory dysfunction    Prophylactic measure    Electrolyte depletion          MEDICATIONS  (STANDING):  albuterol    90 MICROgram(s) HFA Inhaler 2 Puff(s) Inhalation every 6 hours  buPROPion XL (24-Hour) . 150 milliGRAM(s) Oral daily  cefepime   IVPB 1000 milliGRAM(s) IV Intermittent every 12 hours  cefepime   IVPB      chlorhexidine 2% Cloths 1 Application(s) Topical <User Schedule>  dextrose 5% + sodium chloride 0.45%. 1000 milliLiter(s) (75 mL/Hr) IV Continuous <Continuous>  divalproex  milliGRAM(s) Oral two times a day  dorzolamide 2% Ophthalmic Solution 1 Drop(s) Right EYE <User Schedule>  finasteride 5 milliGRAM(s) Oral daily  furosemide   Injectable 40 milliGRAM(s) IV Push once  levothyroxine 137 MICROGram(s) Oral daily  pantoprazole    Tablet 40 milliGRAM(s) Oral two times a day  potassium chloride   Powder 40 milliEquivalent(s) Oral once  sodium bicarbonate 1300 milliGRAM(s) Oral three times a day  sucralfate suspension 1 Gram(s) Oral four times a day  tamsulosin 0.4 milliGRAM(s) Oral at bedtime  traZODone 50 milliGRAM(s) Oral at bedtime  vitamin A &amp; D Ointment 1 Application(s) Topical at bedtime    MEDICATIONS  (PRN):  metoclopramide Injectable 5 milliGRAM(s) IV Push every 6 hours PRN nausea/vomit    Vital Signs Last 24 Hrs  T(C): 36.3 (2023 11:29), Max: 36.9 (2023 21:26)  T(F): 97.4 (2023 11:29), Max: 98.4 (2023 21:26)  HR: 70 (2023 11:29) (70 - 91)  BP: 101/45 (2023 11:29) (97/54 - 142/62)  BP(mean): --  RR: 18 (2023 11:29) (18 - 19)  SpO2: 100% (2023 11:29) (100% - 100%)    Parameters below as of 2023 11:29  Patient On (Oxygen Delivery Method): nasal cannula  O2 Flow (L/min): 2    Daily     Daily     PHYSICAL EXAM:  Constitutional:  He appears comfortable and not distressed. Not diaphoretic.    Neck:  The thyroid is normal. Trachea is midline.     Breasts: Normal examination.    Respiratory: The lungs are clear to auscultation. No dullness and expansion is normal.    Cardiovascular: S1 and S2 are normal. No mummurs, rubs or gallops are present.    Gastrointestinal: The abdomen is soft. No tenderness is present. No masses are present. Bowel sounds are normal.    Genitourinary: The bladder is not distended. No CVA tenderness is present.    Extremities: No edema is noted. No deformities are present.    Neurological: Cognition is normal. Tone, power and sensation are normal.     Skin: No lesions are seen  or palpated.    Lymph Nodes: No lymphadenopathy is present.    Psychiatric: Flat affect.                              8.0    12.56 )-----------( 130      ( 2023 05:47 )             25.4     06-08    140  |  114<H>  |  71<H>  ----------------------------<  115<H>  3.7   |  15<L>  |  4.82<H>    Ca    9.5      2023 05:47  Phos  2.4     06-08  Mg     1.9     06-08    TPro  4.9<L>  /  Alb  1.3<L>  /  TBili  0.2  /  DBili  x   /  AST  4<L>  /  ALT  <6<L>  /  AlkPhos  86  06-08    Urinalysis Basic - ( 2023 22:00 )    Color: Yellow / Appearance: Clear / S.010 / pH: x  Gluc: x / Ketone: Negative  / Bili: Negative / Urobili: Negative   Blood: x / Protein: 30 mg/dL / Nitrite: Negative   Leuk Esterase: Small / RBC: 2-5 /HPF / WBC 11-25 /HPF   Sq Epi: x / Non Sq Epi: x / Bacteria: Few /HPF

## 2023-06-08 NOTE — BH CONSULTATION LIAISON ASSESSMENT NOTE - CURRENT MEDICATION
MEDICATIONS  (STANDING):  albuterol    90 MICROgram(s) HFA Inhaler 2 Puff(s) Inhalation every 6 hours  buPROPion XL (24-Hour) . 150 milliGRAM(s) Oral daily  cefepime   IVPB 1000 milliGRAM(s) IV Intermittent every 12 hours  cefepime   IVPB      chlorhexidine 2% Cloths 1 Application(s) Topical <User Schedule>  dextrose 5% + sodium chloride 0.45%. 1000 milliLiter(s) (75 mL/Hr) IV Continuous <Continuous>  divalproex  milliGRAM(s) Oral two times a day  dorzolamide 2% Ophthalmic Solution 1 Drop(s) Right EYE <User Schedule>  finasteride 5 milliGRAM(s) Oral daily  levothyroxine 137 MICROGram(s) Oral daily  pantoprazole    Tablet 40 milliGRAM(s) Oral two times a day  potassium chloride   Powder 40 milliEquivalent(s) Oral once  sodium bicarbonate 1300 milliGRAM(s) Oral three times a day  sucralfate suspension 1 Gram(s) Oral four times a day  tamsulosin 0.4 milliGRAM(s) Oral at bedtime  traZODone 50 milliGRAM(s) Oral at bedtime  vitamin A &amp; D Ointment 1 Application(s) Topical at bedtime    MEDICATIONS  (PRN):  metoclopramide Injectable 5 milliGRAM(s) IV Push every 6 hours PRN nausea/vomit

## 2023-06-08 NOTE — BH CONSULTATION LIAISON ASSESSMENT NOTE - SUMMARY
62 y/o M with a hx of CKD, COPD, CHF, BPH, PVD, lymphedema, anemia, decubitus ulcers, poly-neuropathy, hypothyroidism, and Bipolar disorder, who is admitted due to abdominal pain. He is consulted due to agitation. Patient with what seem to be intermittent behavioral disturbances, r/o delirium. He is not suicidal, homicidal or psychotic.    -Cont Bupropion  mg PO daily, Divalproex 500 mg PO BID and Trazodone 50 mg PO HS  -PRN: Haldol 1mg IM q 6 hrs PRN for agitation  -Monitor his QTc; as per last EKG, his QTc is 502 under Bazett, but 462 under [place]  -Check Depakote level  -Monitor platelets and LFT's  -Delirium precautions  -Avoid use of benzodiazepines and anticholinergics  -Case discussed with the primary team  -Will follow as needed

## 2023-06-08 NOTE — PROGRESS NOTE ADULT - SUBJECTIVE AND OBJECTIVE BOX
GAYLA PEARCE    SCU progress note    INTERVAL HPI/OVERNIGHT EVENTS: No acute events overnight.     FULL CODE    Covid - 19 PCR: 2023 non-reactive.     The 4Ms    What Matters Most: see GOC  Age appropriate Medications/Screen for High Risk Medication: Yes  Mentation: see CAM below  Mobility: defer to physical exam    The Confusion Assessment Method (CAM) Diagnostic Algorithm     1: Acute Onset or Fluctuating Course  - Is there evidence of an acute change in mental status from the patient’s baseline? Did the (abnormal) behavior  fluctuate during the day, that is, tend to come and go, or increase and decrease in severity?  [ ] YES [x ] NO      2: Inattention  - Did the patient have difficulty focusing attention, being easily distractible, or having difficulty keeping track of what was being said?  [ ] YES [xx ] NO     3: Disorganized thinking  -Was the patient’s thinking disorganized or incoherent, such as rambling or irrelevant conversation, unclear or illogical flow of ideas, or unpredictable switching from subject to subject?  [ x] YES [ ] NO    4: Altered Level of consciousness?  [ ] YES [x ] NO    The diagnosis of delirium by CAM requires the presence of features 1 and 2 and either 3 or 4.    PRESSORS: [ ] YES [x ] NO  cefepime   IVPB      cefepime   IVPB 1000 milliGRAM(s) IV Intermittent every 12 hours    Cardiovascular:  Heart Failure  Acute   Acute on Chronic  Chronic         Pulmonary:  albuterol    90 MICROgram(s) HFA Inhaler 2 Puff(s) Inhalation every 6 hours    Hematalogic:  heparin   Injectable 5000 Unit(s) SubCutaneous every 8 hours    Other:  buPROPion XL (24-Hour) . 150 milliGRAM(s) Oral daily  chlorhexidine 2% Cloths 1 Application(s) Topical <User Schedule>  dextrose 5% + sodium chloride 0.45%. 1000 milliLiter(s) IV Continuous <Continuous>  divalproex  milliGRAM(s) Oral two times a day  dorzolamide 2% Ophthalmic Solution 1 Drop(s) Right EYE <User Schedule>  finasteride 5 milliGRAM(s) Oral daily  levothyroxine 137 MICROGram(s) Oral daily  metoclopramide Injectable 5 milliGRAM(s) IV Push every 6 hours PRN  pantoprazole    Tablet 40 milliGRAM(s) Oral two times a day  potassium chloride   Powder 40 milliEquivalent(s) Oral once  sodium bicarbonate 1300 milliGRAM(s) Oral three times a day  sucralfate suspension 1 Gram(s) Oral four times a day  tamsulosin 0.4 milliGRAM(s) Oral at bedtime  traZODone 50 milliGRAM(s) Oral at bedtime  vitamin A &amp; D Ointment 1 Application(s) Topical at bedtime    albuterol    90 MICROgram(s) HFA Inhaler 2 Puff(s) Inhalation every 6 hours  buPROPion XL (24-Hour) . 150 milliGRAM(s) Oral daily  cefepime   IVPB 1000 milliGRAM(s) IV Intermittent every 12 hours  cefepime   IVPB      chlorhexidine 2% Cloths 1 Application(s) Topical <User Schedule>  dextrose 5% + sodium chloride 0.45%. 1000 milliLiter(s) IV Continuous <Continuous>  divalproex  milliGRAM(s) Oral two times a day  dorzolamide 2% Ophthalmic Solution 1 Drop(s) Right EYE <User Schedule>  finasteride 5 milliGRAM(s) Oral daily  heparin   Injectable 5000 Unit(s) SubCutaneous every 8 hours  levothyroxine 137 MICROGram(s) Oral daily  metoclopramide Injectable 5 milliGRAM(s) IV Push every 6 hours PRN  pantoprazole    Tablet 40 milliGRAM(s) Oral two times a day  potassium chloride   Powder 40 milliEquivalent(s) Oral once  sodium bicarbonate 1300 milliGRAM(s) Oral three times a day  sucralfate suspension 1 Gram(s) Oral four times a day  tamsulosin 0.4 milliGRAM(s) Oral at bedtime  traZODone 50 milliGRAM(s) Oral at bedtime  vitamin A &amp; D Ointment 1 Application(s) Topical at bedtime    Drug Dosing Weight  Height (cm): 172.7 (28 May 2023 19:50)  Weight (kg): 83.9 (28 May 2023 19:50)  BMI (kg/m2): 28.1 (28 May 2023 19:50)  BSA (m2): 1.98 (28 May 2023 19:50)    CENTRAL LINE: [ ] YES [x ] NO  LOCATION:   DATE INSERTED: 2023 2/2 urinary retention.   REMOVE: [ ] YES [x ] NO  EXPLAIN: Persistent urinary retention with SHEYLA    HATFIELD: [x ] YES [ ] NO    DATE INSERTED:  REMOVE:  [ ] YES [ ] NO  EXPLAIN:    PAST MEDICAL & SURGICAL HISTORY:  Hypothyroid      Hyperlipidemia      Ambulatory dysfunction      Anemia      History of BPH      CKD (chronic kidney disease)      Chronic obstructive pulmonary disease (COPD)      Bipolar disorder      HLD (hyperlipidemia)      BPH (benign prostatic hyperplasia)      Chronic diastolic congestive heart failure      Lymphedema      Chronic leg pain      No significant past surgical history     @ 07:01  -  06- @ 07:00  --------------------------------------------------------  IN: 0 mL / OUT: 500 mL / NET: -500 mL    PHYSICAL EXAM:    GENERAL: NAD, well-groomed, well-developed  HEAD:  Atraumatic, Normocephalic  EYES: EOMI, PERRLA, conjunctiva and sclera clear  ENMT: Moist mucous membrane  NECK: Supple, No JVD, Normal thyroid  NERVOUS SYSTEM:  Alert & Oriented X2, Good concentration; Moves all exts spontaneously. Follows simple commands.    CHEST/LUNG: Diminished at the bilaterally; No rales, rhonchi, wheezing, or rubs. On 2 L NC  HEART: Regular rate and rhythm; No murmurs, rubs, or gallops  ABDOMEN: Soft, Nontender, Nondistended; Bowel sounds present  EXTREMITIES:  1+ Peripheral Pulses, No clubbing, cyanosis, + 2 pitting edema lower exts.   SKIN: Stage 2 with DTI on Sacrum and Discoloration of lower extremities.   Psych: Irritable, uncooperative and resistant medical tx and plan of care.       LABS:  CBC Full  -  ( 2023 05:47 )  WBC Count : 12.56 K/uL  RBC Count : 2.59 M/uL  Hemoglobin : 8.0 g/dL  Hematocrit : 25.4 %  Platelet Count - Automated : 130 K/uL  Mean Cell Volume : 98.1 fl  Mean Cell Hemoglobin : 30.9 pg  Mean Cell Hemoglobin Concentration : 31.5 gm/dL  Auto Neutrophil # : x  Auto Lymphocyte # : x  Auto Monocyte # : x  Auto Eosinophil # : x  Auto Basophil # : x  Auto Neutrophil % : x  Auto Lymphocyte % : x  Auto Monocyte % : x  Auto Eosinophil % : x  Auto Basophil % : x        140  |  114<H>  |  71<H>  ----------------------------<  115<H>  3.7   |  15<L>  |  4.82<H>    Ca    9.5      2023 05:47  Phos  2.4     06-  Mg     1.9     -    TPro  4.9<L>  /  Alb  1.3<L>  /  TBili  0.2  /  DBili  x   /  AST  4<L>  /  ALT  <6<L>  /  AlkPhos  86        Urinalysis Basic - ( 2023 22:00 )    Color: Yellow / Appearance: Clear / S.010 / pH: x  Gluc: x / Ketone: Negative  / Bili: Negative / Urobili: Negative   Blood: x / Protein: 30 mg/dL / Nitrite: Negative   Leuk Esterase: Small / RBC: 2-5 /HPF / WBC 11-25 /HPF   Sq Epi: x / Non Sq Epi: x / Bacteria: Few /HPF    [x  ]  DVT Prophylaxis  [ x ]  Nutrition Regular diet    RADIOLOGY & ADDITIONAL STUDIES:   < from: CT Abdomen and Pelvis No Cont (23 @ 22:34) >  ACC: [id number] EXAM:  CT ABDOMEN AND PELVIS   ORDERED BY: GERHARD REDDY     PROCEDURE DATE:  2023        INTERPRETATION:  CLINICAL INFORMATION: Abdominal pain and vomiting. Rule   out obstruction.    COMPARISON: CT of the abdomen and pelvis from 2022.    CONTRAST/COMPLICATIONS:  IV Contrast: NONE  Evaluation of the visceral organs is limited without   intravenous contrast  Oral Contrast: NONE  Complications: None reported at time of study completion    PROCEDURE:  CT of the Abdomen and Pelvis was performed.  Sagittal and coronal reformats were performed.    FINDINGS:  LOWER CHEST: Bibasilar subsegmental atelectasis. Circumferential wall   thickening of the distal esophagus.    LIVER: Within normal limits.  BILE DUCTS: Normal caliber.  GALLBLADDER: Cholecystectomy.  SPLEEN: Within normal limits.  PANCREAS: Within normal limits.  ADRENALS: Within normal limits.  KIDNEYS/URETERS: Redemonstrated moderate nonspecific bilateral   perinephric fat stranding. No hydronephrosis.    BLADDER: Collapsed around a Hatfield catheter balloon. Intravesicular gas   secondary to instrumentation. Circumferential wall thickening.  REPRODUCTIVE ORGANS: Prostate within normal limits.    BOWEL: No bowel obstruction or inflammation. Scattered colonic   diverticulosis without diverticulitis. Appendix is normal.  PERITONEUM: No ascites.  VESSELS: Infrarenal IVC filter.  RETROPERITONEUM/LYMPH NODES: No lymphadenopathy.  ABDOMINAL WALL: Small bilateral fat-containing inguinal hernias.  BONES: Old right rib fractures. Severe right hip degenerative change with   remodeling of the right femoral head. Degenerative changes of the spine.    IMPRESSION:  1. No bowel obstruction or inflammation.  2. Circumferential wall thickening of the distal esophagus which could be   due to an esophagitis.  3. Circumferential urinary bladder wall thickening which could be due to   underdistention versus a cystitis. Correlate with urinalysis.      --- End of Report ---    GERHARD HAGAN MD; Attending Radiologist  This document has been electronically signed. May 28 2023 11:30PM    < end of copied text >  < from: CT Head No Cont (23 @ 22:33) >  ACC: [id number] EXAM:  CT BRAIN   ORDERED BY: GERHARD REDDY     PROCEDURE DATE:  2023          INTERPRETATION:  CLINICAL INDICATION: Altered mental status, vomiting.    TECHNIQUE:  Noncontrast CT of the head was performed.  Sagittal and coronal reformats were created.    COMPARISON STUDY: 2022    FINDINGS:    PARENCHYMA AND VENTRICLES: No acute intracranial hemorrhage, mass effect,   or midline shift. No hydrocephalus. Prominence of the sulci and   ventricles, consistent with age-related parenchymal volume loss; the   extent of ventricular and sulcal prominence appears somewhat increased   compared to 2022. Small vessel white matter changes.  EXTRA-AXIAL: No abnormal extraaxial collection.  PARANASAL SINUSES: Layering fluid in the right sphenoid sinus and   additional mild scattered mucosal thickening, as on prior.  TYMPANOMASTOID CAVITIES: Within normal limits.  ORBITS: Bilateral cataract surgery.  CALVARIUM: Within normal limits.  MISCELLANEOUS: Intracranial atherosclerosis.    IMPRESSION:  No acute intracranial hemorrhage, mass effect, or midline shift.  Paranasal sinus mucosal disease.  The extent of ventricular and sulcal prominence appears increased   compared with 2022, indeterminate clinical significance.    --- End of Report ---       UMM SIBLEY MD; Attending Radiologist  This document has been electronically signed. May 28 2023 11:22PM    < end of copied text >  < from: Xray Chest 1 View- PORTABLE-Urgent (Xray Chest 1 View- PORTABLE-Urgent .) (23 @ 20:42) >  ACC: [id number] EXAM:  XR CHEST PORTABLE URGENT 1V   ORDERED BY: GERHARD REDDY     PROCEDURE DATE:  2023          INTERPRETATION:  CLINICAL STATEMENT: Abdominal pain. Rule out infiltrate    TECHNIQUE: AP view of the chest.      COMPARISON: 2022    The cardiomediastinal silhouette is normal and the anthony are not enlarged.   The trachea is midline. Suboptimal degree of inspiration with   bronchovascular crowding. Mild platelike atelectatic change right base.   There is no focal lung consolidation or sizable pleural effusion. No   significant osseous abnormality. Degenerative changes of the spine and AC   joints. Old right rib fracture deformities.    IMPRESSION:    Unremarkable frontal chest x ray    --- End of Report ---      THOMAS ELLIOTT MD; Attending Radiologist  This document has been electronically signed. May 29 2023  1:01PM    < end of copied text >      Plan of care discussed with intensivist.

## 2023-06-08 NOTE — PROGRESS NOTE ADULT - PROBLEM SELECTOR PLAN 4
S/P EGD  +Ulcerative esophagitis  Continue PPI and Carafate /needs 2 weeks  Regular diet and tolerating well.  Patient refusing to eat and refusing to participate in speech therapy assessment.

## 2023-06-09 NOTE — CONSULT NOTE ADULT - PROBLEM SELECTOR RECOMMENDATION 8
Clinical evidence indicates that the patient has Moderate protein calorie malnutrition/ 2nd degree  In context of Chronic Illness (>1 month)--SHEYLA on CKD and functional quadriplegia, with multiple medical comorbidities.  Also with concern for FTT.    As evidenced by:  Energy/Food intake <75% of estimated energy requirement >7 days  Weight loss: Mild   Body Fat loss: Mild    Muscle mass loss: Mild, with multiple areas of skin breakdown, albumin 1.6  Fluid Accumulation: Mild   + LE edema   Strength: weakened Mild     Recommend:   c/w regular renal diet as tolerated, aspiration precautions, keep head of bed at least 45 degrees during feeding

## 2023-06-09 NOTE — PROGRESS NOTE ADULT - SUBJECTIVE AND OBJECTIVE BOX
GAYLA PEARCE    SCU progress note    INTERVAL HPI/OVERNIGHT EVENTS: No events overnight.     Full code.     Covid - 19 PCR: 6/7/2023 non-reactive.     The 4Ms    What Matters Most: see GOC  Age appropriate Medications/Screen for High Risk Medication: Yes  Mentation: see CAM below  Mobility: defer to physical exam    The Confusion Assessment Method (CAM) Diagnostic Algorithm     1: Acute Onset or Fluctuating Course  - Is there evidence of an acute change in mental status from the patient’s baseline? Did the (abnormal) behavior  fluctuate during the day, that is, tend to come and go, or increase and decrease in severity?  [ ] YES [x ] NO     2: Inattention  - Did the patient have difficulty focusing attention, being easily distractible, or having difficulty keeping track of what was being said?  x[x ] YES [ ] NO     3: Disorganized thinking  -Was the patient’s thinking disorganized or incoherent, such as rambling or irrelevant conversation, unclear or illogical flow of ideas, or unpredictable switching from subject to subject?  [ ] YES [x ] NO    4: Altered Level of consciousness?  [ x] YES [ ] NO    The diagnosis of delirium by CAM requires the presence of features 1 and 2 and either 3 or 4.    PRESSORS: [ ] YES [ ] NO  cefepime   IVPB 1000 milliGRAM(s) IV Intermittent every 12 hours  cefepime   IVPB        Cardiovascular:  Heart Failure  Acute   Acute on Chronic  Chronic       midodrine. 5 milliGRAM(s) Oral every 8 hours    Pulmonary:  albuterol    90 MICROgram(s) HFA Inhaler 2 Puff(s) Inhalation every 6 hours    Hematalogic:    Other:  albumin human 25% IVPB 50 milliLiter(s) IV Intermittent every 8 hours  buPROPion XL (24-Hour) . 150 milliGRAM(s) Oral daily  chlorhexidine 2% Cloths 1 Application(s) Topical <User Schedule>  dorzolamide 2% Ophthalmic Solution 1 Drop(s) Right EYE <User Schedule>  finasteride 5 milliGRAM(s) Oral daily  haloperidol    Injectable 1 milliGRAM(s) IntraMuscular every 6 hours PRN  levothyroxine 137 MICROGram(s) Oral daily  pantoprazole    Tablet 40 milliGRAM(s) Oral two times a day  potassium chloride   Powder 40 milliEquivalent(s) Oral once  potassium phosphate IVPB 30 milliMole(s) IV Intermittent once  sodium bicarbonate 1300 milliGRAM(s) Oral three times a day  sucralfate suspension 1 Gram(s) Oral four times a day  tamsulosin 0.4 milliGRAM(s) Oral at bedtime  traZODone 50 milliGRAM(s) Oral at bedtime  vitamin A &amp; D Ointment 1 Application(s) Topical at bedtime    albumin human 25% IVPB 50 milliLiter(s) IV Intermittent every 8 hours  albuterol    90 MICROgram(s) HFA Inhaler 2 Puff(s) Inhalation every 6 hours  buPROPion XL (24-Hour) . 150 milliGRAM(s) Oral daily  cefepime   IVPB      cefepime   IVPB 1000 milliGRAM(s) IV Intermittent every 12 hours  chlorhexidine 2% Cloths 1 Application(s) Topical <User Schedule>  dorzolamide 2% Ophthalmic Solution 1 Drop(s) Right EYE <User Schedule>  finasteride 5 milliGRAM(s) Oral daily  haloperidol    Injectable 1 milliGRAM(s) IntraMuscular every 6 hours PRN  levothyroxine 137 MICROGram(s) Oral daily  midodrine. 5 milliGRAM(s) Oral every 8 hours  pantoprazole    Tablet 40 milliGRAM(s) Oral two times a day  potassium chloride   Powder 40 milliEquivalent(s) Oral once  potassium phosphate IVPB 30 milliMole(s) IV Intermittent once  sodium bicarbonate 1300 milliGRAM(s) Oral three times a day  sucralfate suspension 1 Gram(s) Oral four times a day  tamsulosin 0.4 milliGRAM(s) Oral at bedtime  traZODone 50 milliGRAM(s) Oral at bedtime  vitamin A &amp; D Ointment 1 Application(s) Topical at bedtime    Drug Dosing Weight  Height (cm): 172.7 (28 May 2023 19:50)  Weight (kg): 83.9 (28 May 2023 19:50)  BMI (kg/m2): 28.1 (28 May 2023 19:50)  BSA (m2): 1.98 (28 May 2023 19:50)    CENTRAL LINE: [ ] YES [x ] NO  LOCATION:   DATE INSERTED:  REMOVE: [ ] YES [ ] NO  EXPLAIN:    HATFIELD: [ x] YES [ ] NO    DATE INSERTED:  REMOVE:  [ ] YES [ ] NO  EXPLAIN:    PAST MEDICAL & SURGICAL HISTORY:  Hypothyroid      Hyperlipidemia      Ambulatory dysfunction      Anemia      History of BPH      CKD (chronic kidney disease)      Chronic obstructive pulmonary disease (COPD)      Bipolar disorder      HLD (hyperlipidemia)      BPH (benign prostatic hyperplasia)      Chronic diastolic congestive heart failure      Lymphedema      Chronic leg pain      No significant past surgical history    06-08 @ 07:01  -  06-09 @ 07:00  --------------------------------------------------------  IN: 0 mL / OUT: 400 mL / NET: -400 mL    PHYSICAL EXAM:  GENERAL: NAD.   HEAD:  Atraumatic, Normocephalic  EYES: EOMI, PERRLA, conjunctiva and sclera clear  ENMT: Dry mucous membrane  NECK: Supple  NERVOUS SYSTEM:  Lethargic & Oriented X2 (hospital, year, president, situation). Confused to month.  Moves all exts spontaneously. Follows simple commands.    CHEST/LUNG: Diminished at the bases  bilaterally R>L.  No rales, rhonchi, wheezing, or rubs. On 2 L NC  HEART: Regular rate and rhythm; No murmurs, rubs, or gallops  ABDOMEN: Soft, Nontender, Nondistended; Bowel sounds present  : Hatfield in place.   EXTREMITIES:  1+ Peripheral Pulses, No clubbing, cyanosis, + 2 pitting edema lower exts.   SKIN: Stage 2 with DTI on Sacrum and Discoloration of lower extremities.   Psych: Flat affect. Irritable, uncooperative and resistant medical tx and plan of care.       LABS:  CBC Full  -  ( 09 Jun 2023 06:30 )  WBC Count : 10.49 K/uL  RBC Count : 2.29 M/uL  Hemoglobin : 7.1 g/dL  Hematocrit : 22.3 %  Platelet Count - Automated : 111 K/uL  Mean Cell Volume : 97.4 fl  Mean Cell Hemoglobin : 31.0 pg  Mean Cell Hemoglobin Concentration : 31.8 gm/dL  Auto Neutrophil # : x  Auto Lymphocyte # : x  Auto Monocyte # : x  Auto Eosinophil # : x  Auto Basophil # : x  Auto Neutrophil % : x  Auto Lymphocyte % : x  Auto Monocyte % : x  Auto Eosinophil % : x  Auto Basophil % : x    06-09    139  |  112<H>  |  70<H>  ----------------------------<  116<H>  2.7<LL>   |  14<L>  |  4.91<H>    Ca    9.1      09 Jun 2023 06:30  Phos  2.2     06-09  Mg     2.0     06-09    TPro  4.7<L>  /  Alb  1.4<L>  /  TBili  0.2  /  DBili  x   /  AST  4<L>  /  ALT  <6<L>  /  AlkPhos  80  06-09    [ x ]  DVT Prophylaxis SCDs.   [  ]  Nutrition: refusing to eat. Reguular diet.     RADIOLOGY & ADDITIONAL STUDIES:      < from: Xray Chest 1 View- PORTABLE-Urgent (Xray Chest 1 View- PORTABLE-Urgent .) (06.08.23 @ 09:30) >  ACC: [id number] EXAM:  XR CHEST PORTABLE URGENT 1V   ORDERED BY: FABRICE WORTHY     PROCEDURE DATE:  06/08/2023          INTERPRETATION:  Chest one view    HISTORY: Shortness of breath    COMPARISON STUDY: 5/28/2023    Frontal expiratory view of the chest shows the heart to be normal in   size. The lungs show clear left lung with mild right base atelectasis.   There is a questionable developing patchy infiltrate of the mid right   lung and there is no evidence of pneumothorax nor pleural effusion.    IMPRESSION:  Questionable right infiltrate. Follow-up study is recommended as   clinically warranted.        Thank you for the courtesy of this referral.    --- End of Report ---      ROYCE PALACIOS MD; Attending Interventional Radiologist  This document has been electronically signed. Jun 8 2023 10:27AM    < end of copied text >    Discussed plan of care with intensivist and consultants.     Called and spoke with Patient's son regarding clinical status. All questions answered.

## 2023-06-09 NOTE — CHART NOTE - NSCHARTNOTEFT_GEN_A_CORE
Spoke with Germán Rhoades (Brother) regarding patient's critical clinical status and need to transfer to ICU. Explained in detail. All questions answered. Obtained consent from brother for blood transfusion. Risks and benefits explained.

## 2023-06-09 NOTE — CHART NOTE - NSCHARTNOTEFT_GEN_A_CORE
Attempted to contact Dr. Cheung via phone and text regarding patient's critical H&H, hypotension and transfer to ICU.

## 2023-06-09 NOTE — PROGRESS NOTE ADULT - PROBLEM SELECTOR PLAN 3
#hypokalemia 2.7  Replacement in porgress    # hypophosphatemia   2.2 Replacement in progress    F/u BMP, Mag and Phosp in AM

## 2023-06-09 NOTE — CONSULT NOTE ADULT - PROBLEM SELECTOR RECOMMENDATION 4
s/P EGD on 5/30, HGB overall has remained stable  Presently no signs of active GI bleeding (although H/H down to 7.1/22 as of this morning)  Continue Protonix and sucralfate suspension  Monitor closely

## 2023-06-09 NOTE — CONSULT NOTE ADULT - CONSULT REASON
Complex medical decision making in the context of multiple medical comorbidities, including GI bleed, sepsis, and worsening  CKD Complex medical decision making in the context of multiple medical comorbidities, including GI bleed, sepsis, and worsening SHEYLA on CKD

## 2023-06-09 NOTE — CONSULT NOTE ADULT - ASSESSMENT
Assessment  - 63 year old Male, from [place], [facility], ambulates with walker , chronic FC, h/o obesity, HTN, HLD, PVD, Seizures, CKD4 (base SCr 3.8), anemia, BPH, Lymphedema, hypothyroidism, HFpEF, possible COPD, decubitus ulcer (sacrum/gluteal), polyneuropathy, polyarthritis, bipolar disorder, presented to ED for abdominal pain, nausea and vomiting brown emesis. Admitted to ICU for Hypotension likely from Sepsis secondary to complicated UTI and esophagitis. ICU consulted to     Plan  ====Neuro====  #Baseline mentation: AAOx2   - at baseline    #Failure to thrive  - Refusing to eat and participate in medical tx.   - Psych and Palliative involved    ====CVS====  #Septic  -     ====Pulm====  #    ====GI====  #Diet:   #Bowel regimen:     ====Renal====  #    ====ID====  #    ====Heme/Onc====  #    ====Endo====  #bG goal: 140-180    ====Skin/lines====  #peripheral:   #CVC:   #daley:     ====Ppx====  #DVT: Lovenox or Heparin SQ  #GI: PPI or Pepcid   Assessment  - 63 year old Male, from [place], AOx[facility], ambulates with walker , chronic FC, h/o obesity, HTN, HLD, PVD, Seizures, CKD4 (base SCr 3.8), anemia, BPH, Lymphedema, hypothyroidism, HFpEF, possible COPD, decubitus ulcer (sacrum/gluteal), polyneuropathy, polyarthritis, bipolar disorder, presented to ED for abdominal pain, nausea and vomiting brown emesis. Admitted to ICU for Hypotension likely from Sepsis secondary to complicated UTI and esophagitis. ICU consulted to     Plan  ====Neuro====  #Baseline mentation: AAOx2   - at baseline    #Seizure  - depakote d/c on 6/9  - restarting on depakote  - seizure precautions    #Bipolar disorder.   - cont. w/ Wellbutrin, Trazadone  - Psych following  - Depakote discontinued in the setting of thrombocytopenia    #Failure to thrive  - Refusing to eat and participate in medical tx.   - Psych and Palliative involved    ====CVS====  #Sepsis 2/2 pseudomonas bacteremia and UTI  - was already on cefepime (last day 6/10 to complete a total of 14 days)  - s/p RRT for hypotension, given 2L IVF  - cont w/ cefepime  - f/u Bcx and UA    #Thrombocytopenia  - HIT panel negative  - f/u DIC panel  - heme/onc consulted    ====Pulm====  #AHRF  - likely 2/2 acute anemia and/or hypotension  - cont with NC   - monitor O2 sat and titrate off as indicated    ====GI====  #Diet: regular  #Bowel regimen: none    #Ulcerative esophagitis  - cont. w/ PPI and Carafate    ====Renal====  #SHEYLA on CKD  - monitor Cr  - plan for HD, if no improvement, but no need for urgent HD  - nephro following    #BPH  - cont with finasteride and tamsulosin  - on daley    ====ID====  #Sepsis  - see "sepsis" above    ====Heme/Onc====  #acute anemia  - likely 2/2 anemia or chronic disease +/- dilutional   - h/o suspected GIB, s/p EGD r/o'd GIB  - cbc 5.3  - transfusing 2u pRBC  - f/u hemolytic work up    ====Endo====  #bG goal: 140-180  - ISS for poor oral intake    #Hypothyroid.   - cont with synthroid    ====Skin/lines====  #peripheral: 2 peripherals in each arm  #CVC: none  #daley: yes    ====Ppx====  #DVT: hold given acute anemia  #GI: PPI

## 2023-06-09 NOTE — PROGRESS NOTE ADULT - NS ATTEND AMEND GEN_ALL_CORE FT
Problem/Plan - 1:  ·  Problem: Sepsis.   ·  Plan: Secondary to Bacteremia and complicated UTI.  Blood culture positive for Gram negative rods/Pseudomonas  Ucx likely contaminant.   repeated blood cultures (6/1) NGTD  Continue Cefepime- discharge Cipro 250mgs po BID to complete a total of 14 days of all antibiotics( Maxipime and cipro )  Last fay 6/10.  Leukocytosis improved. Afebrile.   Soft bp likely 2/2 orthostatic hypotension  Midodrine started.     Problem/Plan - 2:  ·  Problem: GIB (gastrointestinal bleeding).   ·  Plan: S/P EGD. No signs of active bleeding.  Continue Carafate and PPI.  H&H stable.     Problem/Plan - 3:  ·  Problem: Electrolyte depletion.   ·  Plan: #hypokalemia 2.7  Replacement in p[place]    # hypophosphatemia   2.2 Replacement in progress    F/u BMP, Mag and Phosp in AM.     Problem/Plan - 4:  ·  Problem: Esophagitis.   ·  Plan: S/P EGD  +Ulcerative esophagitis  Continue PPI and Carafate /needs 2 weeks  Regular diet and tolerating well.  Patient refusing to eat and refusing to participate in speech therapy assessment.     Problem/Plan - 5:  ·  Problem: Seizure.   ·  Plan: Continue valproic acid 500 mg BID.   Valproic acid level less than 40 (Valproic acid for Bipolar disorder).   Maintain seizure precautions.  No s/s of seizures.

## 2023-06-09 NOTE — PROGRESS NOTE ADULT - ASSESSMENT
63 year old Male, from [place], AOx3, ambulates with walker , chronic FC, h/o obesity, HTN, HLD, PVD, Seizures, CKD4 (base SCr 3.8), anemia, BPH, Lymphedema, hypothyroidism, HFpEF, possible COPD, decubitus ulcer (sacrum/gluteal), polyneuropathy, polyarthritis, bipolar disorder. Patient presented to ED for abdominal pain, nausea and vomiting brown emesis. Patient admitted to ICU for Hypotension likely from Sepsis secondary to complicated UTI and esophagitis. CT head was negative for acute changes. CT abd showed circumferential wall thickening of distal esophagus. GI consulted recommending endoscopy which took place on 5/30 with findings of ulcerative esophagitis. On PPI.   Blood cultures from 5/28 + for P. aeruginasa. Ucx likely contaminant. On cefepime.  Patient with pressure injury. Wound care consult. Nephrology following for SHEYLA on CKD.   06/01: Repeat blood cx pending. Advance diet as tolerated. PT reccs TG (5/30). Plan to return to [place] once repeat Bcx come back. C/w Cefepime for complicated UTI and P. Aeruginosa bacteriemia. Afebrile. BP soft this AM.    06/02: Repeat blood cx pending. On Cefepime. PT reccs TG. Dispo ([place]). Hemodynamically stable. Plan for OOB to chair.   06/03: Repeat Bcx from 6/1 NGTD. Afebrile. On Cefepime. ID following. Hemodynamically stable. Plan to advance diet to full liquids.   6/4: Patient with tachycardia secondary to frustration from not being able to be discharge. Emotional support given, encouraged to verbalize concerns, explained in full the plan of care. Patient VS became stable, will hold off on EKG as isolated event.   06/07: Urinary retention with Viera insertion overnight. No acceptance from chosen facilities. Bcx NGTD. Plan to d/c on Cipro and Maxepime for a total of 14 days per ID. SCr increased to 4.80 from 4.48. Hypokalemic this AM. Replacement in progress. Titrating O2.   06/08: Costa in WBC. Afebrile. Soft BP yesterday with SBP in the 90s.  C/o SOB this AM. See assessment above. CXR ordered and BNP. Patient refuses to participate in PT. Will attempt again today. Plan to D[facility] when optimized.   06/09: Lethargic. Thrombocytopenia Discontinued Depakote. Discussed with psych as it could be causing drop on plt. HIT panel negative. Hem/Onc consulted. Soft BP. Orthostatic hypotension. Midodrine started. IVF. Electrolyte derangements Replacement in progress. CXR shows possible infiltrate RLL. on 2L NC. Afebrile. Worsening SHEYLA. Discussed wit hnephro. No plan for HD at this time. Will continue to monitor. Patient has Viera and making urine.

## 2023-06-09 NOTE — PROGRESS NOTE ADULT - PROBLEM SELECTOR PLAN 1
Secondary to Bacteremia and complicated UTI.  Blood culture positive for Gram negative rods/Pseudomonas  Ucx likely contaminant.   repeated blood cultures (6/1) NGTD  Continue Cefepime- discharge Cipro 250mgs po BID to complete a total of 14 days of all antibiotics( Maxipime and cipro )  Last fay 6/10.  Leukocytosis improved. Afebrile.   Soft bp likely 2/2 orthostatic hypotension  Midodrine started.

## 2023-06-09 NOTE — CONSULT NOTE ADULT - PROBLEM SELECTOR RECOMMENDATION 5
Multiple stage I/II decubitus ulcers of sacrum, bilateral heels, and bilateral buttocks/gluteal area (including stage II of R gluteus muscle), likely due to functional quadriplegia in the setting of multiple medical comorbidities.    Continue present wound care recommendations  Encourage PT as tolerated (patient currently unable 2/2 acute delirium)  Offloading of heels  Frequent turning and repositioning while in bed

## 2023-06-09 NOTE — CHART NOTE - NSCHARTNOTEFT_GEN_A_CORE
Was asked to evaluate Mr. Rhoades' capacity to decide his DNR/DNI status. He was awake, but refused to engage or participate of an evaluation. An assessment could not be completed, either due to his own volition, or more likely, due to an emerging delirious state. Would rely on the primary team and Pall Care's determination regarding his capacity, but given his presentation from yesterday, would lean on him lacking the capacity for such a decision.

## 2023-06-09 NOTE — CONSULT NOTE ADULT - SUBJECTIVE AND OBJECTIVE BOX
Consult to: Discuss complex medical decision making related to goals of care    [facility] Consult Service:  [place] DO: cell ([phone])  Curt Palmer MD: cell ([phone])  Bubba Kelly NP: cell ([phone])   Renay Morales DNP: cell ([phone])  Avery Henderson LMSW: cell ([phone])   Angela King NP: via [employer] Teams    Can contact any Palliative Team member via [employer] Teams for consults and questions      HPI:  64 y/o M, A&Ox3, bedbound, chronic daley catheter with CKD (baseline creat 3.8), COPD, CHFpEF, BPH, PVD, Lymphedema, decubitus ulcers (Sacrum/gluteal) , Anemia, poly-neuropathy, poly-arthritis, hypothyroidism, seizures and Bipolar disorder was BIB EMS from [place] for abdominal pain, [place]. Mr. Rhoades states that he developed acute onset sharp abdominal pain 4 days ago a/w "brown" emesis (witnessed by EMS). Unable to tolerate food/drink x 4 days. Having normal bowel movements, last this morning 5/28. Denies any fevers. NH paperwork states concern for obstruction.  Patient endorses BLOODY EMESIS x 4 days upon further questioning. (29 May 2023 01:36)      PAST MEDICAL & SURGICAL HISTORY:  Hypothyroid      Hyperlipidemia      Ambulatory dysfunction      Anemia      History of BPH      CKD (chronic kidney disease)      Chronic obstructive pulmonary disease (COPD)      Bipolar disorder      HLD (hyperlipidemia)      BPH (benign prostatic hyperplasia)      Chronic diastolic congestive heart failure      Lymphedema      Chronic leg pain      No significant past surgical history          SOCIAL HISTORY:    Admitted from:  home  (with HHA)           assisted living          [facility]   [ none ] Substance abuse, [ none ] Tobacco hx, [ none ] Alcohol hx, [ none ] Home Opioid Hx    FAMILY HISTORY:   unable to obtain from patient due to poor mentation, family unable to give information, see H&P for history  Baseline ADLs (prior to admission):    Allergies    No Known Allergies    Intolerances      Present Symptoms: Mild, Moderate, Severe  Pain:             Location -                               Aggravating factors -             Quality -             Radiation -             Timing-             Severity (0-10 scale):             Minimal acceptable level (0-10 scale):  Fatigue:  Nausea:  Lack of Appetite:   SOB:  Depression:  Anxiety:  Review of Systems: [All others negative or Unable to obtain due to poor mentation]    CPOT:    https://www.[email or web address]/getattachment/[email or web address]/Critical-Care-Pain-Observation-Tool-(CPOT)  PAIN AD Score:   http://geriatrictoolkit.[email or web address]/painad.pdf (press ctrl +  left click to view)      MEDICATIONS  (STANDING):  albumin human 25% IVPB 50 milliLiter(s) IV Intermittent every 8 hours  albuterol    90 MICROgram(s) HFA Inhaler 2 Puff(s) Inhalation every 6 hours  buPROPion XL (24-Hour) . 150 milliGRAM(s) Oral daily  cefepime   IVPB 1000 milliGRAM(s) IV Intermittent every 12 hours  cefepime   IVPB      chlorhexidine 2% Cloths 1 Application(s) Topical <User Schedule>  divalproex  milliGRAM(s) Oral two times a day  dorzolamide 2% Ophthalmic Solution 1 Drop(s) Right EYE <User Schedule>  finasteride 5 milliGRAM(s) Oral daily  levothyroxine 137 MICROGram(s) Oral daily  norepinephrine Infusion 0.05 MICROgram(s)/kG/Min (7.87 mL/Hr) IV Continuous <Continuous>  pantoprazole    Tablet 40 milliGRAM(s) Oral two times a day  potassium chloride  10 mEq/100 mL IVPB 10 milliEquivalent(s) IV Intermittent every 1 hour  sodium bicarbonate 1300 milliGRAM(s) Oral three times a day  sucralfate suspension 1 Gram(s) Oral four times a day  tamsulosin 0.4 milliGRAM(s) Oral at bedtime  traZODone 50 milliGRAM(s) Oral at bedtime  vitamin A &amp; D Ointment 1 Application(s) Topical at bedtime    MEDICATIONS  (PRN):  haloperidol    Injectable 1 milliGRAM(s) IntraMuscular every 6 hours PRN Agitation      PHYSICAL EXAM:  Vital Signs Last 24 Hrs  T(C): 35.1 (09 Jun 2023 22:35), Max: 36.4 (09 Jun 2023 06:15)  T(F): 95.2 (09 Jun 2023 22:35), Max: 97.6 (09 Jun 2023 06:15)  HR: 96 (09 Jun 2023 23:00) (67 - 108)  BP: 106/37 (09 Jun 2023 23:00) (80/40 - 144/92)  BP(mean): 51 (09 Jun 2023 23:00) (46 - 109)  RR: 19 (09 Jun 2023 23:00) (14 - 36)  SpO2: 98% (09 Jun 2023 23:00) (80% - 100%)    Parameters below as of 09 Jun 2023 23:00  Patient On (Oxygen Delivery Method): nasal cannula  O2 Flow (L/min): 2      General: alert  oriented x ____    lethargic distressed cachexia  nonverbal  unarousable verbal    Palliative Performance Scale/Karnofsky Score:  http://npcrc.org/files/news/palliative_performance_scale_ppsv2.pdf    HEENT: no abnormal lesion, dry mouth  ET tube/trach oral lesions:  Lungs: tachypnea/labored breathing, audible excessive secretions  CV: RRR, S1S2, tachycardia  GI: soft non distended non tender  incontinent               PEG/NG/OG tube  constipation  last BM:   : incontinent  oliguria/anuria  daley  Musculoskeletal: weakness x4 edema x4    ambulatory with assistance   mostly/fully bedbound/wheelchair bound  Skin: no abnormal skin lesions, poor skin turgor, pressure ulcer stage:   Neuro: no deficits, mild cognitive impairment dsyphagia/dysarthria paresis  Oral intake ability: unable/only mouth care, minimal moderate full capability    LABS:                        6.8    8.47  )-----------( 90       ( 09 Jun 2023 20:50 )             21.7     06-09    141  |  117<H>  |  64<H>  ----------------------------<  86  2.9<LL>   |  13<L>  |  4.73<H>    Ca    8.5      09 Jun 2023 20:50  Phos  2.0     06-09  Mg     1.6     06-09    TPro  4.6<L>  /  Alb  1.6<L>  /  TBili  0.2  /  DBili  <0.1  /  AST  3<L>  /  ALT  <6<L>  /  AlkPhos  74  06-09        RADIOLOGY & ADDITIONAL STUDIES:     Consult to: Discuss complex medical decision making related to goals of care    [facility] Consult Service:  [facility] DO: cell ([phone])  Curt Palmer MD: cell ([phone])  Bubba Kelly NP: cell ([phone])   Renay Morales DNP: cell ([phone])  Avery Henderson LMSW: cell ([phone])   Angela King NP: via [employer] Teams    Can contact any Palliative Team member via [employer] Teams for consults and questions      HPI:  64 y/o M, current resident of [facility], A&Ox3, bedbound at baseline, chronic Viera catheter with PMHx of  CKD (baseline creat 3.8), COPD, CHFpEF, BPH, PVD, Lymphedema, multiple decubitus ulcers (Sacrum/gluteal) , Anemia, polyneuropathy, polyarthritis, hypothyroidism, seizures and Bipolar disorder.  Originally BIBEMS from [place] to [facility] late evening 5/28 for abdominal pain, nausea, and vomiting.   Patient stated that he developed acute onset sharp abdominal pain 4 days ago a/w "brown" emesis (witnessed by EMS). Unable to tolerate food/drink x 4 days. Having normal bowel movements, last this morning 5/28. Denies any fevers. NH paperwork states concern for obstruction.  Patient endorses BLOODY EMESIS x 4 days upon further questioning. (29 May 2023 01:36)    Patient originally admitted to ICU for sepsis 2/2 CAUTI and suspected UGI bleeding.  Initial CT of abd/pelvis notable for circumferential wall thickening of the distal esophagus c/w esophagitis and bladder wall thickening but otherwise negative for intraabdominal pathology.  Started on IV vancomycin + cefepime, and IV Protonix.  BP rapidly improved with IV fluids and patient downgraded to AI unit on 5/29.  Underwent EGD on 5/30 which showed ulcerative esophagitis.  Hospital course further complicated by development of Pseudomonas bacteremia (pan-sensitive).  CBC overall has remained stable since admission (HGB 8.4-8.9), repeat blood cultures negative.  Failed TOV, Coude Viera catheter reinserted 6/6.  Per chart review, patient does not want to return to [place], asking for different facility, frustrated by delay in discharge (no accepting facilities yet), ? now refusing to participate in PT.  WBC spiked to 12 on 6/8.  Also with worsening SHEYLA and increasing Cr over the last 72 hours (Cr 4.91 earlier today 6/9).  Seen by Psychiatry/BH on 6/8, no changes in home Psychiatric meds, suggested adding Haldol IM 1 mg Q 6 PRN severe agitation.    This AM, AI/medical team reports patient with increasing confusion and disorientation.      Patient examined at bedside this afternoon.  He is alert and oriented x 1-2 only and grossly confused/disoriented.  Denies pain, shortness of breath, nausea, or vomiting.  Otherwise has no acute complaints.    When asked briefly, he states that he would still want to be resuscitated and to undergo HD if his kidneys fail, but does not appear to have sufficient decision making capacity to understand the full implications of these decisions.    MOLST previously completed by patient on 5/30 (when he was fully A&O x4) with orders for CPR, trial of intubation + BIPAP, but no tracheostomy or long-term PEG/enteral feeds.      PAST MEDICAL & SURGICAL HISTORY:  Hypothyroid      Hyperlipidemia      Ambulatory dysfunction      Anemia      History of BPH      CKD (chronic kidney disease)      Chronic obstructive pulmonary disease (COPD)      Bipolar disorder      HLD (hyperlipidemia)      BPH (benign prostatic hyperplasia)      Chronic diastolic congestive heart failure      Lymphedema      Chronic leg pain      No significant past surgical history          SOCIAL HISTORY:    Admitted from:  [facility] (short-term)  [ none ] Substance abuse, [ none ] Tobacco hx, [ none ] Alcohol hx, [ none ] Home Opioid Hx    FAMILY HISTORY:  Unable to obtain from patient due to poor mentation, family unable to give information, see H&P for history  Baseline ADLs (prior to admission):  bedbound 2/2 functional quadriplegia, dependent for ADLs.    Allergies    No Known Allergies    Intolerances      Present Symptoms: Mild, Moderate, Severe  Pain:   Denies presently             Location -                               Aggravating factors -             Quality -             Radiation -             Timing-             Severity (0-10 scale):  0/10             Minimal acceptable level (0-10 scale):  Fatigue:  Denies  Nausea:  Denies  Lack of Appetite:   SOB:  Denies  Depression:  Denies  Anxiety:  Review of Systems:  All other systems reviewed and are negative      MEDICATIONS  (STANDING):  albumin human 25% IVPB 50 milliLiter(s) IV Intermittent every 8 hours  albuterol    90 MICROgram(s) HFA Inhaler 2 Puff(s) Inhalation every 6 hours  buPROPion XL (24-Hour) . 150 milliGRAM(s) Oral daily  cefepime   IVPB 1000 milliGRAM(s) IV Intermittent every 12 hours  cefepime   IVPB      chlorhexidine 2% Cloths 1 Application(s) Topical <User Schedule>  divalproex  milliGRAM(s) Oral two times a day  dorzolamide 2% Ophthalmic Solution 1 Drop(s) Right EYE <User Schedule>  finasteride 5 milliGRAM(s) Oral daily  levothyroxine 137 MICROGram(s) Oral daily  norepinephrine Infusion 0.05 MICROgram(s)/kG/Min (7.87 mL/Hr) IV Continuous <Continuous>  pantoprazole    Tablet 40 milliGRAM(s) Oral two times a day  potassium chloride  10 mEq/100 mL IVPB 10 milliEquivalent(s) IV Intermittent every 1 hour  sodium bicarbonate 1300 milliGRAM(s) Oral three times a day  sucralfate suspension 1 Gram(s) Oral four times a day  tamsulosin 0.4 milliGRAM(s) Oral at bedtime  traZODone 50 milliGRAM(s) Oral at bedtime  vitamin A &amp; D Ointment 1 Application(s) Topical at bedtime    MEDICATIONS  (PRN):  haloperidol    Injectable 1 milliGRAM(s) IntraMuscular every 6 hours PRN Agitation      PHYSICAL EXAM:  Vital Signs Last 24 Hrs  T(C): 35.1 (09 Jun 2023 22:35), Max: 36.4 (09 Jun 2023 06:15)  T(F): 95.2 (09 Jun 2023 22:35), Max: 97.6 (09 Jun 2023 06:15)  HR: 96 (09 Jun 2023 23:00) (67 - 108)  BP: 106/37 (09 Jun 2023 23:00) (80/40 - 144/92)  BP(mean): 51 (09 Jun 2023 23:00) (46 - 109)  RR: 19 (09 Jun 2023 23:00) (14 - 36)  SpO2: 98% (09 Jun 2023 23:00) (80% - 100%)    Parameters below as of 09 Jun 2023 23:00  Patient On (Oxygen Delivery Method): nasal cannula  O2 Flow (L/min): 2      General: alert  oriented x __1__    lethargic, grossly disoriented and confused, otherwise in NAD    Palliative Performance Scale/Karnofsky Score:  30%  http://npcrc.org/files/news/palliative_performance_scale_ppsv2.pdf    HEENT: EOMI, anicteric, pharynx clear, MM moist  Lungs: clear to auscultation, respirations unlabored  CV: RRR, normal S1 and S2, no murmur  GI: soft non distended non tender + normal bowel sounds   : incontinent + Viera cath in place  Musculoskeletal: weakness x4 in all extremities, completely bedbound, 1+ edema of legs B/L  Skin:  Multiple stage I DU of bilateral heels, bilateral gluteal areas, and coccyx (non-blanchable erythema); stage II DU to R gluteus muscle  Neuro: no focal deficits, +moderate cognitive impairment (likely 2/2 delirium)  Oral intake ability: full capability, previously on regular (renal) diet    LABS:                        6.8    8.47  )-----------( 90       ( 09 Jun 2023 20:50 )             21.7     06-09    141  |  117<H>  |  64<H>  ----------------------------<  86  2.9<LL>   |  13<L>  |  4.73<H>    Ca    8.5      09 Jun 2023 20:50  Phos  2.0     06-09  Mg     1.6     06-09    TPro  4.6<L>  /  Alb  1.6<L>  /  TBili  0.2  /  DBili  <0.1  /  AST  3<L>  /  ALT  <6<L>  /  AlkPhos  74  06-09        RADIOLOGY & ADDITIONAL STUDIES:    ACC: [id number] EXAM:  XR CHEST PORTABLE URGENT 1V   ORDERED BY: FABRICE WORTHY     PROCEDURE DATE:  06/08/2023          INTERPRETATION:  Chest one view    HISTORY: Shortness of breath    COMPARISON STUDY: 5/28/2023    Frontal expiratory view of the chest shows the heart to be normal in   size. The lungs show clear left lung with mild right base atelectasis.   There is a questionable developing patchy infiltrate of the mid right   lung and there is no evidence of pneumothorax nor pleural effusion.    IMPRESSION:  Questionable right infiltrate. Follow-up study is recommended as   clinically warranted.        Thank you for the courtesy of this referral.    --- End of Report ---      ACC: [id number] EXAM:  CT BRAIN   ORDERED BY: GERHARD REDDY     PROCEDURE DATE:  05/28/2023          INTERPRETATION:  CLINICAL INDICATION: Altered mental status, vomiting.    TECHNIQUE:  Noncontrast CT of the head was performed.  Sagittal and coronal reformats were created.    COMPARISON STUDY: 12/4/2022    FINDINGS:    PARENCHYMA AND VENTRICLES: No acute intracranial hemorrhage, mass effect,   or midline shift. No hydrocephalus. Prominence of the sulci and   ventricles, consistent with age-related parenchymal volume loss; the   extent of ventricular and sulcal prominence appears somewhat increased   compared to 12/4/2022. Small vessel white matter changes.  EXTRA-AXIAL: No abnormal extraaxial collection.  PARANASAL SINUSES: Layering fluid in the right sphenoid sinus and   additional mild scattered mucosal thickening, as on prior.  TYMPANOMASTOID CAVITIES: Within normal limits.  ORBITS: Bilateral cataract surgery.  CALVARIUM: Within normal limits.  MISCELLANEOUS: Intracranial atherosclerosis.    IMPRESSION:  No acute intracranial hemorrhage, mass effect, or midline shift.  Paranasal sinus mucosal disease.  The extent of ventricular and sulcal prominence appears increased   compared with 12/4/2022, indeterminate clinical significance.    --- End of Report ---      ACC: [id number] EXAM:  CT ABDOMEN AND PELVIS   ORDERED BY: GERHARD REDDY     PROCEDURE DATE:  05/28/2023          INTERPRETATION:  CLINICAL INFORMATION: Abdominal pain and vomiting. Rule   out obstruction.    COMPARISON: CT of the abdomen and pelvis from 12/1/2022.    CONTRAST/COMPLICATIONS:  IV Contrast: NONE  Evaluation of the visceral organs is limited without   intravenous contrast  Oral Contrast: NONE  Complications: None reported at time of study completion    PROCEDURE:  CT of the Abdomen and Pelvis was performed.  Sagittal and coronal reformats were performed.    FINDINGS:  LOWER CHEST: Bibasilar subsegmental atelectasis. Circumferential wall   thickening of the distal esophagus.    LIVER: Within normal limits.  BILE DUCTS: Normal caliber.  GALLBLADDER: Cholecystectomy.  SPLEEN: Within normal limits.  PANCREAS: Within normal limits.  ADRENALS: Within normal limits.  KIDNEYS/URETERS: Redemonstrated moderate nonspecific bilateral   perinephric fat stranding. No hydronephrosis.    BLADDER: Collapsed around a Viera catheter balloon. Intravesicular gas   secondary to instrumentation. Circumferential wall thickening.  REPRODUCTIVE ORGANS: Prostate within normal limits.    BOWEL: No bowel obstruction or inflammation. Scattered colonic   diverticulosis without diverticulitis. Appendix is normal.  PERITONEUM: No ascites.  VESSELS: Infrarenal IVC filter.  RETROPERITONEUM/LYMPH NODES: No lymphadenopathy.  ABDOMINAL WALL: Small bilateral fat-containing inguinal hernias.  BONES: Old right rib fractures. Severe right hip degenerative change with   remodeling of the right femoral head. Degenerative changes of the spine.    IMPRESSION:  1. No bowel obstruction or inflammation.  2. Circumferential wall thickening of the distal esophagus which could be   due to an esophagitis.  3. Circumferential urinary bladder wall thickening which could be due to   underdistention versus a cystitis. Correlate with urinalysis.        --- End of Report ---

## 2023-06-09 NOTE — PROGRESS NOTE ADULT - PROBLEM SELECTOR PLAN 7
likely anemia of chronic disease  H&H stable.   CBC in AM    # thrombocytopenia  HIT panel negative  Depakote d/c  CBC in AM  Hem/Onc Dr. Dove Consulted.

## 2023-06-09 NOTE — CONSULT NOTE ADULT - SUBJECTIVE AND OBJECTIVE BOX
INTERVAL HPI/OVERNIGHT EVENTS:   63 year old Male, from [facility], AOx3, ambulates with walker , chronic FC, h/o obesity, HTN, HLD, PVD, Seizures, CKD4 (base SCr 3.8), anemia, BPH, Lymphedema, hypothyroidism, HFpEF, possible COPD, decubitus ulcer (sacrum/gluteal), polyneuropathy, polyarthritis, bipolar disorder. Patient presented to ED for abdominal pain, nausea and vomiting brown emesis. Patient admitted to ICU for Hypotension likely from Sepsis secondary to complicated UTI and esophagitis. CT head was negative for acute changes. CT abd showed circumferential wall thickening of distal esophagus. GI consulted recommending endoscopy which took place on 5/30 with findings of ulcerative esophagitis. On PPI.   Blood cultures from 5/28 + for P. aeruginasa. Ucx likely contaminant. On cefepime.  Patient with pressure injury. Wound care consult. Nephrology following for SHEYLA on CKD.   06/01: Repeat blood cx pending. Advance diet as tolerated. PT reccs TG (5/30). Plan to return to [facility] once repeat Bcx come back. C/w Cefepime for complicated UTI and P. Aeruginosa bacteriemia. Afebrile. BP soft this AM.    06/02: Repeat blood cx pending. On Cefepime. PT reccs TG. Dispo ([facility]). Hemodynamically stable. Plan for OOB to chair.   06/03: Repeat Bcx from 6/1 NGTD. Afebrile. On Cefepime. ID following. Hemodynamically stable. Plan to advance diet to full liquids.   6/4: Patient with tachycardia secondary to frustration from not being able to be discharge. Emotional support given, encouraged to verbalize concerns, explained in full the plan of care. Patient VS became stable, will hold off on EKG as isolated event.   06/07: Urinary retention with Viera insertion overnight. No acceptance from chosen facilities. Bcx NGTD. Plan to d/c on Cipro and Maxepime for a total of 14 days per ID. SCr increased to 4.80 from 4.48. Hypokalemic this AM. Replacement in progress. Titrating O2.   06/08: Costa in WBC. Afebrile. Soft BP yesterday with SBP in the 90s.  C/o SOB this AM. See assessment above. CXR ordered and BNP. Patient refuses to participate in PT. Will attempt again today. Plan to D/C to [facility] when optimized.   06/09: Lethargic. Thrombocytopenia Discontinued Depakote. Discussed with psych as it could be causing drop on plt. HIT panel negative. Hem/Onc consulted. Soft BP. Orthostatic hypotension. Midodrine started. IVF. Electrolyte derangements Replacement in progress. CXR shows possible infiltrate RLL. on 2L NC. Afebrile. Worsening SHEYLA. Discussed wit hnephro. No plan for HD at this time. Will continue to monitor. Patient has Viera and making urine.    RRT initiated for hypotension. Patient was admitted for sepsis 2/2 UTI and possibly developing PNA. Blood pressure was noted to be in mid 70's. NS 1L bolus started. Patient also noted to have hemoglobin of 7.1 this am. Patient will be give 1u pRBC and transfer to ICU for pressor support.     Antimicrobial:  cefepime   IVPB      cefepime   IVPB 1000 milliGRAM(s) IV Intermittent every 12 hours    Cardiovascular:    Pulmonary:  albuterol    90 MICROgram(s) HFA Inhaler 2 Puff(s) Inhalation every 6 hours    Hematalogic:    Other:  albumin human 25% IVPB 50 milliLiter(s) IV Intermittent every 8 hours  buPROPion XL (24-Hour) . 150 milliGRAM(s) Oral daily  chlorhexidine 2% Cloths 1 Application(s) Topical <User Schedule>  dorzolamide 2% Ophthalmic Solution 1 Drop(s) Right EYE <User Schedule>  finasteride 5 milliGRAM(s) Oral daily  haloperidol    Injectable 1 milliGRAM(s) IntraMuscular every 6 hours PRN  levothyroxine 137 MICROGram(s) Oral daily  pantoprazole    Tablet 40 milliGRAM(s) Oral two times a day  potassium chloride   Powder 40 milliEquivalent(s) Oral once  potassium phosphate IVPB 30 milliMole(s) IV Intermittent once  sodium bicarbonate 1300 milliGRAM(s) Oral three times a day  sodium chloride 0.9% Bolus 1000 milliLiter(s) IV Bolus once  sucralfate suspension 1 Gram(s) Oral four times a day  tamsulosin 0.4 milliGRAM(s) Oral at bedtime  traZODone 50 milliGRAM(s) Oral at bedtime  vitamin A &amp; D Ointment 1 Application(s) Topical at bedtime    albumin human 25% IVPB 50 milliLiter(s) IV Intermittent every 8 hours  albuterol    90 MICROgram(s) HFA Inhaler 2 Puff(s) Inhalation every 6 hours  buPROPion XL (24-Hour) . 150 milliGRAM(s) Oral daily  cefepime   IVPB 1000 milliGRAM(s) IV Intermittent every 12 hours  cefepime   IVPB      chlorhexidine 2% Cloths 1 Application(s) Topical <User Schedule>  dorzolamide 2% Ophthalmic Solution 1 Drop(s) Right EYE <User Schedule>  finasteride 5 milliGRAM(s) Oral daily  haloperidol    Injectable 1 milliGRAM(s) IntraMuscular every 6 hours PRN  levothyroxine 137 MICROGram(s) Oral daily  pantoprazole    Tablet 40 milliGRAM(s) Oral two times a day  potassium chloride   Powder 40 milliEquivalent(s) Oral once  potassium phosphate IVPB 30 milliMole(s) IV Intermittent once  sodium bicarbonate 1300 milliGRAM(s) Oral three times a day  sodium chloride 0.9% Bolus 1000 milliLiter(s) IV Bolus once  sucralfate suspension 1 Gram(s) Oral four times a day  tamsulosin 0.4 milliGRAM(s) Oral at bedtime  traZODone 50 milliGRAM(s) Oral at bedtime  vitamin A &amp; D Ointment 1 Application(s) Topical at bedtime    Drug Dosing Weight  Height (cm): 172.7 (28 May 2023 19:50)  Weight (kg): 83.9 (28 May 2023 19:50)  BMI (kg/m2): 28.1 (28 May 2023 19:50)  BSA (m2): 1.98 (28 May 2023 19:50)    ICU Vital Signs Last 24 Hrs  T(C): 35.8 (09 Jun 2023 14:05), Max: 36.4 (09 Jun 2023 06:15)  T(F): 96.5 (09 Jun 2023 14:05), Max: 97.6 (09 Jun 2023 06:15)  HR: 86 (09 Jun 2023 14:05) (67 - 86)  BP: 84/51 (09 Jun 2023 14:05) (84/51 - 148/98)  BP(mean): --  ABP: --  ABP(mean): --  RR: 18 (09 Jun 2023 14:05) (17 - 18)  SpO2: 99% (09 Jun 2023 14:05) (99% - 100%)    O2 Parameters below as of 09 Jun 2023 14:05  Patient On (Oxygen Delivery Method): room air  O2 Flow (L/min): 2                06-08 @ 07:01  -  06-09 @ 07:00  --------------------------------------------------------  IN: 0 mL / OUT: 400 mL / NET: -400 mL              PHYSICAL EXAM:  GENERAL: mild lethargy  HEAD: Atraumatic, Normocephalic  EYES: EOMI, PERRLA, conjunctiva and sclera clear  ENMT: Dry mucous membrane  NECK: Supple  NERVOUS SYSTEM: AAOx2. Moves all exts spontaneously. Follows simple commands.    CHEST/LUNG: Diminished at the bases  bilaterally R>L.  No rales, rhonchi, wheezing, or rubs. On 2 L NC  HEART: Regular rate and rhythm; No murmurs, rubs, or gallops  ABDOMEN: Soft, Nontender, Nondistended; Bowel sounds present  : Viera in place.   EXTREMITIES:  1+ Peripheral Pulses, No clubbing, cyanosis, + 2 pitting edema lower exts.   SKIN: Stage 2 with DTI on Sacrum and Discoloration of lower extremities.   Psych: Flat affect. Irritable, uncooperative and resistant medical tx and plan of care.       LABS:  CBC Full  -  ( 09 Jun 2023 18:40 )  WBC Count : 7.60 K/uL  RBC Count : 1.73 M/uL  Hemoglobin : 5.3 g/dL  Hematocrit : 17.3 %  Platelet Count - Automated : 89 K/uL  Mean Cell Volume : 100.0 fl  Mean Cell Hemoglobin : 30.6 pg  Mean Cell Hemoglobin Concentration : 30.6 gm/dL  Auto Neutrophil # : x  Auto Lymphocyte # : x  Auto Monocyte # : x  Auto Eosinophil # : x  Auto Basophil # : x  Auto Neutrophil % : x  Auto Lymphocyte % : x  Auto Monocyte % : x  Auto Eosinophil % : x  Auto Basophil % : x    06-09    139  |  112<H>  |  70<H>  ----------------------------<  116<H>  2.7<LL>   |  14<L>  |  4.91<H>    Ca    9.1      09 Jun 2023 06:30  Phos  2.2     06-09  Mg     2.0     06-09    TPro  4.7<L>  /  Alb  1.4<L>  /  TBili  0.2  /  DBili  x   /  AST  4<L>  /  ALT  <6<L>  /  AlkPhos  80  06-09            RADIOLOGY & ADDITIONAL STUDIES REVIEWED:  Yes    CRITICAL CARE TIME SPENT: 35 minutes

## 2023-06-09 NOTE — PROGRESS NOTE ADULT - SUBJECTIVE AND OBJECTIVE BOX
GAYLA PEARCE  63y  Patient is a 63y old  Male who presents with a chief complaint of sepsis (08 Jun 2023 21:51)    HPI:  Seen and examined. No new complaints. Renal function slowly worsening.      HEALTH ISSUES - PROBLEM Dx:  GIB (gastrointestinal bleeding)    Sepsis    Anemia    Hypothyroid    CKD (chronic kidney disease)    BPH (benign prostatic hyperplasia)    Bipolar disorder    Advance care planning    Seizure    Esophagitis    Hypokalemia    Ambulatory dysfunction    Prophylactic measure    Electrolyte depletion          MEDICATIONS  (STANDING):  albumin human 25% IVPB 50 milliLiter(s) IV Intermittent every 8 hours  albuterol    90 MICROgram(s) HFA Inhaler 2 Puff(s) Inhalation every 6 hours  buPROPion XL (24-Hour) . 150 milliGRAM(s) Oral daily  cefepime   IVPB 1000 milliGRAM(s) IV Intermittent every 12 hours  cefepime   IVPB      chlorhexidine 2% Cloths 1 Application(s) Topical <User Schedule>  dorzolamide 2% Ophthalmic Solution 1 Drop(s) Right EYE <User Schedule>  finasteride 5 milliGRAM(s) Oral daily  levothyroxine 137 MICROGram(s) Oral daily  midodrine. 5 milliGRAM(s) Oral every 8 hours  pantoprazole    Tablet 40 milliGRAM(s) Oral two times a day  potassium chloride   Powder 40 milliEquivalent(s) Oral once  potassium phosphate IVPB 30 milliMole(s) IV Intermittent once  sodium bicarbonate 1300 milliGRAM(s) Oral three times a day  sucralfate suspension 1 Gram(s) Oral four times a day  tamsulosin 0.4 milliGRAM(s) Oral at bedtime  traZODone 50 milliGRAM(s) Oral at bedtime  vitamin A &amp; D Ointment 1 Application(s) Topical at bedtime    MEDICATIONS  (PRN):  haloperidol    Injectable 1 milliGRAM(s) IntraMuscular every 6 hours PRN Agitation    Vital Signs Last 24 Hrs  T(C): 36.4 (09 Jun 2023 06:15), Max: 36.4 (08 Jun 2023 14:15)  T(F): 97.6 (09 Jun 2023 06:15), Max: 97.6 (09 Jun 2023 06:15)  HR: 67 (09 Jun 2023 06:15) (67 - 90)  BP: 92/64 (09 Jun 2023 06:15) (81/50 - 148/98)  BP(mean): --  RR: 17 (09 Jun 2023 06:15) (17 - 18)  SpO2: 100% (09 Jun 2023 06:15) (100% - 100%)    Parameters below as of 09 Jun 2023 06:15  Patient On (Oxygen Delivery Method): room air  O2 Flow (L/min): 2    Daily     Daily     PHYSICAL EXAM:  Constitutional: He appears comfortable and not distressed. Not diaphoretic.    Neck:  The thyroid is normal. Trachea is midline.     Breasts: Normal examination.    Respiratory: The lungs are clear to auscultation. No dullness and expansion is normal.    Cardiovascular: S1 and S2 are normal. No mummurs, rubs or gallops are present.    Gastrointestinal: The abdomen is soft. No tenderness is present. No masses are present.     Genitourinary: The bladder is not distended. No CVA tenderness is present.    Extremities: No edema is noted. No deformities are present.    Neurological: Cognition is normal. Tone, power and sensation are normal. Gait is steady.    Skin: No lesions are seen  or palpated.    Lymph Nodes: No lymphadenopathy is present.    Psychiatric: Mood is appropriate. No hallucinations or flight of ideas are noted.                              7.1    10.49 )-----------( 111      ( 09 Jun 2023 06:30 )             22.3     06-09    139  |  112<H>  |  70<H>  ----------------------------<  116<H>  2.7<LL>   |  14<L>  |  4.91<H>    Ca    9.1      09 Jun 2023 06:30  Phos  2.2     06-09  Mg     2.0     06-09    TPro  4.7<L>  /  Alb  1.4<L>  /  TBili  0.2  /  DBili  x   /  AST  4<L>  /  ALT  <6<L>  /  AlkPhos  80  06-09

## 2023-06-09 NOTE — CONSULT NOTE ADULT - PROBLEM SELECTOR RECOMMENDATION 9
Unclear etiology, possibly due to worsening SHEYLA and/or anemia, with concern for ? recurrent sepsis  Patient has acute delirium, medical decision making capacity clearly impaired at this time    Continue IV cefepime  Frequent reorientation  Further work-up and management as per primary medical team As above.  62 yo male from NH setting with multiple medical comorbidities, including CKD IV, chronic HFpEF, bipolar disorder, seizure disorder, anemia (? in part related to CKD) and functional quadriplegia/chronic bedbound status 2/2 polyneuropathy/polyarthritis, with multiple DU and emerging protein-calorie malnutrition (alb 1.6).  Admitted for sepsis due to CAUTI with EGD showing ulcerative esophagitis; hospital course complicated by + Pseudomonas bacteremia, now with worsening SHEYLA on CKD and new onset delirium/acute encephalopathy.    Patient is at high risk of increased morbidity and mortality, including further functional decline, worsening skin failure, recurrent infections, further decline in renal and cardiac status, and recurrent hospitalizations (patient already with multiple hospital admits since 2021).  He would be at significant risk of requiring dialysis in the future.  He is at increased risk of death over the next 6-12 months and would be low threshold for hospice    Per previous GOC discussion with patient on 5/30, MOLST orders completed for CPR and trial of intubation/BIPAP. Patient stated he would not want ventilation via trach or long term enteral feeds.  Patient now without decision making capacity due to acute delirium    Continue management as per Nephrology, ID, and primary medical team  Patient remains Full Code for now  Will continue attempts to contact brother (Germán Rhoades) for further GOC discussion  Palliative care team will continue to follow.

## 2023-06-09 NOTE — CHART NOTE - NSCHARTNOTEFT_GEN_A_CORE
Patient appeared septic with hypotension. WBC was normal and patient afebrile. Spoke to ID (Dr. Nolasco) regarding antibiotic coverage. He is on Cefepime for pseudomonas bacteremia. As per ID, continue Cefepime. Can add Vanco once patient spikes a fever and elevation of WBC.

## 2023-06-09 NOTE — PROGRESS NOTE ADULT - PROBLEM SELECTOR PLAN 11
DVT and GI prophylaxis.  PT recs TG.   Patient refusing to participate in physical therapy and in speech therapy.   No acceptance from other facilities.   [facility] only option.   CM to follow    # Failure to thrive  Refusing to eat and participate in medical tx.   Psych and Palliative involved  Albumin trial x 48 hrs.

## 2023-06-09 NOTE — PROGRESS NOTE ADULT - PROBLEM SELECTOR PLAN 6
Continue Wellbutrin, Trazadone.  Psych following  Maintain safety measures.  Depakote discontinued in the setting of thrombocytopenia.

## 2023-06-09 NOTE — RAPID RESPONSE TEAM SUMMARY - NSSITUATIONBACKGROUNDRRT_GEN_ALL_CORE
RRT initiated for hypotension. Patient was admitted for sepsis 2/2 UTI and possibly developing PNA. Blood pressure was noted to be in mid 70's. NS 1L bolus started. Recommended to repeat blood pressure and give another 1L bolus. Patient should receive at least 30cc/kg before starting pressors.

## 2023-06-09 NOTE — CHART NOTE - NSCHARTNOTEFT_GEN_A_CORE
At 1812 patient's BP was 74/22 HR 91 RR34. RRT called. 1L Bolus initiated.   Critical care consulted. Discussed findings with intensivist Dr. Morin and ICU resident.   Sepsis w/u in progress.   Earlier in AM patient was started on Albumin trial. Per nephro light fluid hydration in the setting of worsening SCr ( CKD 4) although patient is producing urine and need for HD to be eval. during this weekend was discussed.   Notified Nephro of RRT and findings and fluid bolus.   Also Notified ID Dr. Constance MD states that he is not concerned with sepsis since patient is afebrile.   ICU attending at bedside and decision was made to transfer to ICU.     JOSELINE Schofield.

## 2023-06-09 NOTE — CHART NOTE - NSCHARTNOTEFT_GEN_A_CORE
Called Germán Rhoades (Emergency contact) to update on clinical status but unable to get a hold of family member

## 2023-06-09 NOTE — CONSULT NOTE ADULT - ASSESSMENT
62 y/o M, current resident of [facility], A&Ox3, bedbound at baseline, chronic Viera catheter with PMHx of  CKD (baseline creat 3.8), COPD, CHFpEF, BPH, PVD, Lymphedema, multiple decubitus ulcers (Sacrum/gluteal) , Anemia, polyneuropathy, polyarthritis, hypothyroidism, seizures and Bipolar disorder.  Originally BIBEMS from [facility] to [facility] late evening 5/28 for abdominal pain, nausea, and vomiting, including reports of bloody emesis.    Patient originally admitted to ICU for sepsis 2/2 CAUTI and suspected UGI bleeding.  Initial CT of abd/pelvis notable for circumferential wall thickening of the distal esophagus c/w esophagitis and bladder wall thickening but otherwise negative for intraabdominal pathology.  Started on IV vancomycin + cefepime, and IV Protonix.  BP rapidly improved with IV fluids and patient downgraded to AI unit on 5/29.  Underwent EGD on 5/30 which showed ulcerative esophagitis.  Hospital course further complicated by development of Pseudomonas bacteremia (pan-sensitive).  CBC overall has remained stable since admission (HGB 8.4-8.9), repeat blood cultures negative.  Failed TOV, Coude Viera catheter reinserted 6/6.  Per chart review, patient does not want to return to [facility], asking for different facility, frustrated by delay in discharge (no accepting facilities yet), ? now refusing to participate in PT and speech therapy, refusing to eat.  WBC spiked to 12 on 6/8.  Also with worsening SHEYLA and increasing Cr over the last 72 hours (Cr 4.91 earlier today 6/9).  Seen by Psychiatry/BH on 6/8, no changes in home Psychiatric meds, suggested adding Haldol IM 1 mg Q 6 PRN severe agitation.  Now with worsening confusion and disorientation.

## 2023-06-09 NOTE — PROGRESS NOTE ADULT - SUBJECTIVE AND OBJECTIVE BOX
Patient is a 63y old  Male who presents with a chief complaint of sepsis (09 Jun 2023 13:43)    PATIENT IS SEEN AND EXAMINED IN MEDICAL FLOOR.  DORIST [    ]    ALYSIA [   ]      GT [   ]    ALLERGIES:  No Known Allergies      Daily     Daily     VITALS:    Vital Signs Last 24 Hrs  T(C): 35.8 (09 Jun 2023 14:05), Max: 36.4 (09 Jun 2023 06:15)  T(F): 96.5 (09 Jun 2023 14:05), Max: 97.6 (09 Jun 2023 06:15)  HR: 86 (09 Jun 2023 14:05) (67 - 86)  BP: 84/51 (09 Jun 2023 14:05) (84/51 - 148/98)  BP(mean): --  RR: 18 (09 Jun 2023 14:05) (17 - 18)  SpO2: 99% (09 Jun 2023 14:05) (99% - 100%)    Parameters below as of 09 Jun 2023 14:05  Patient On (Oxygen Delivery Method): room air  O2 Flow (L/min): 2      LABS:    CBC Full  -  ( 09 Jun 2023 18:40 )  WBC Count : x  RBC Count : x  Hemoglobin : x  Hematocrit : x  Platelet Count - Automated : 89 K/uL  Mean Cell Volume : x  Mean Cell Hemoglobin : x  Mean Cell Hemoglobin Concentration : x  Auto Neutrophil # : x  Auto Lymphocyte # : x  Auto Monocyte # : x  Auto Eosinophil # : x  Auto Basophil # : x  Auto Neutrophil % : x  Auto Lymphocyte % : x  Auto Monocyte % : x  Auto Eosinophil % : x  Auto Basophil % : x      06-09    139  |  112<H>  |  70<H>  ----------------------------<  116<H>  2.7<LL>   |  14<L>  |  4.91<H>    Ca    9.1      09 Jun 2023 06:30  Phos  2.2     06-09  Mg     2.0     06-09    TPro  4.7<L>  /  Alb  1.4<L>  /  TBili  0.2  /  DBili  x   /  AST  4<L>  /  ALT  <6<L>  /  AlkPhos  80  06-09    CAPILLARY BLOOD GLUCOSE      POCT Blood Glucose.: 105 mg/dL (09 Jun 2023 18:24)        LIVER FUNCTIONS - ( 09 Jun 2023 06:30 )  Alb: 1.4 g/dL / Pro: 4.7 g/dL / ALK PHOS: 80 U/L / ALT: <6 U/L DA / AST: 4 U/L / GGT: x           Creatinine Trend: 4.91<--, 4.82<--, 4.66<--, 4.80<--, 4.48<--, 3.80<--  I&O's Summary    08 Jun 2023 07:01  -  09 Jun 2023 07:00  --------------------------------------------------------  IN: 0 mL / OUT: 400 mL / NET: -400 mL    09 Jun 2023 07:01  -  09 Jun 2023 18:59  --------------------------------------------------------  IN: 0 mL / OUT: 600 mL / NET: -600 mL            .Blood Blood  06-01 @ 10:52   No Growth Final  --  --      .Blood Blood  06-01 @ 10:46   No Growth Final  --  --      .Blood Blood-Peripheral  05-28 @ 21:09   No Growth Final  --  Blood Culture PCR  Pseudomonas aeruginosa      Clean Catch Clean Catch (Midstream)  05-28 @ 21:02   >=3 organisms. Probable collection contamination.  --  --          MEDICATIONS:    MEDICATIONS  (STANDING):  albumin human 25% IVPB 50 milliLiter(s) IV Intermittent every 8 hours  albuterol    90 MICROgram(s) HFA Inhaler 2 Puff(s) Inhalation every 6 hours  buPROPion XL (24-Hour) . 150 milliGRAM(s) Oral daily  cefepime   IVPB 1000 milliGRAM(s) IV Intermittent every 12 hours  cefepime   IVPB      chlorhexidine 2% Cloths 1 Application(s) Topical <User Schedule>  dorzolamide 2% Ophthalmic Solution 1 Drop(s) Right EYE <User Schedule>  finasteride 5 milliGRAM(s) Oral daily  levothyroxine 137 MICROGram(s) Oral daily  pantoprazole    Tablet 40 milliGRAM(s) Oral two times a day  potassium chloride   Powder 40 milliEquivalent(s) Oral once  potassium phosphate IVPB 30 milliMole(s) IV Intermittent once  sodium bicarbonate 1300 milliGRAM(s) Oral three times a day  sodium chloride 0.9% Bolus 1000 milliLiter(s) IV Bolus once  sucralfate suspension 1 Gram(s) Oral four times a day  tamsulosin 0.4 milliGRAM(s) Oral at bedtime  traZODone 50 milliGRAM(s) Oral at bedtime  vitamin A &amp; D Ointment 1 Application(s) Topical at bedtime      MEDICATIONS  (PRN):  haloperidol    Injectable 1 milliGRAM(s) IntraMuscular every 6 hours PRN Agitation      REVIEW OF SYSTEMS:                           ALL ROS DONE [ X   ]    CONSTITUTIONAL:  LETHARGIC [   ], FEVER [   ], UNRESPONSIVE [   ]  CVS:  CP  [   ], SOB, [   ], PALPITATIONS [   ], DIZZYNESS [   ]  RS: COUGH [   ], SPUTUM [   ]  GI: ABDOMINAL PAIN [   ], NAUSEA [   ], VOMITINGS [   ], DIARRHEA [   ], CONSTIPATION [   ]  :  DYSURIA [   ], NOCTURIA [   ], INCREASED FREQUENCY [   ], DRIBLING [   ],  SKELETAL: PAINFUL JOINTS [   ], SWOLLEN JOINTS [   ], NECK ACHE [   ], LOW BACK ACHE [   ],  SKIN : ULCERS [   ], RASH [   ], ITCHING [   ]  CNS: HEAD ACHE [   ], DOUBLE VISION [   ], BLURRED VISION [   ], AMS / CONFUSION [   ], SEIZURES [   ], WEAKNESS [   ],TINGLING / NUMBNESS [   ]    PHYSICAL EXAMINATION:    GENERAL APPEARANCE: NO DISTRESS  HEENT:  NO PALLOR, NO  JVD,  NO   NODES, NECK SUPPLE  CVS: S1 +, S2 +,   RS: AEEB,  OCCASIONAL  RALES +,   NO RONCHI  ABD: SOFT, NT, NO, BS +  EXT: PE+  SKIN: WARM,   SKELETAL:  ROM REDUCED AT CERVICAL & LS SPINE  CNS:  AAO X 2-3      RADIOLOGY :    RADIOLOGY AND READINGS REVIEWED    ASSESSMENT :       PLAN:  HPI:  62 y/o M, A&Ox3, bedbound, chronic daley catheter with CKD (baseline creat 3.8), COPD, CHFpEF, BPH, PVD, Lymphedema, decubitus ulcers (Sacrum/gluteal) , Anemia, poly-neuropathy, poly-arthritis, hypothyroidism, seizures and Bipolar disorder was BIB EMS from [place] for abdominal pain, [place]. Mr. Rhoades states that he developed acute onset sharp abdominal pain 4 days ago a/w "brown" emesis (witnessed by EMS). Unable to tolerate food/drink x 4 days. Having normal bowel movements, last this morning 5/28. Denies any fevers. NH paperwork states concern for obstruction.  Patient endorses BLOODY EMESIS x 4 days upon further questioning. (29 May 2023 01:36)      # RESOLVING SEPSIS S/T P.AERUGINOSA BACTEREMIA + COMPLICATED UTI    - ON CEFEPIME, BCX [P. AERUGINOSA] AND UCX [ > 3 ORGANISMS], REPEAT BCX - NGTD  - ID CONSULT  - CRITICAL CARE EVALUATION IN PROGRESS      # RESOLVING GI BLEED  # ESOPHAGITIS  # ANEMIA OF CHRONIC DISEASE - MONITOR HGB, TRANSFUSION THRESHOLD HGB < 8  - S/P EGD - ULCERATIVE ESOPHAGITIS  - PPI BID  - CARAFATE QID  - ADVANCING DIET PER GI RECOMMENDATION   - GI CONSULT    - [6/6] - EPISODE OF NAUSEA/VOMITING - MONITORING CLOSELY, PRN ANTIEMETICS    # ANEMIA DUE TO GI BLEED, ANEMIA OF CKD     # CHRONIC HYPOXIC RESPIRATORY FAILURE S/T UNDERLYING COPD  - ON PRN O2  - CRITICAL CARE EVALUATION IN PROGRESS    # AMS DUE TO ACUTE METABOLIC ENCEPHALOPATHY    # SHEYLA ON CKD STAGE 4  - WORSENING  # CHRONIC DALEY, BPH  - DALEY   - STRICT IS AND OS  - AVOID NEPHROTOXIC AGENTS  - MONITOR CR  - NEPHROLOGY CONSULT    - [6/7] - URINARY RETENTION OVERNIGHT - DALEY PLACED    # AMBULATORY DYSFUNCTION, IMPAIRED GAIT DUE TO GENERALIZED MUSCLE WEAKNESS, CERVICAL & LS SPINAL STENOSIS, POLYARTRHITIS & PERIPHERAL NEUROPATHY. OP  - FALL PRECAUTIONS    # DYSPHAGIA DUE TO METABOLIC ENCEPHALOPATHY - ON MODIFIED DIET PER SWALLOW EVAL    # HX OF SEIZURE DX  - ON VALPROIC ACID    # HYPOTHYROIDISM - ON LEVOTHYROXINE    # PRESSURE ULCERS  - PATIENT IS HIGH RISK FOR PRESSURE ULCERS DESPITE PREVENTIVE MEASURES IN PLACE  - WOUND CARE CONSULT    # GI PPX

## 2023-06-10 NOTE — PROGRESS NOTE ADULT - SUBJECTIVE AND OBJECTIVE BOX
Patient is a 63y old  Male who presents with a chief complaint of sepsis (10 Carlin 2023 14:32)    PATIENT IS SEEN AND EXAMINED IN MEDICAL FLOOR.  SOWMYA [    ]    ALYSIA [   ]      GT [   ]    ALLERGIES:  No Known Allergies      Daily Height in cm: 172.7 (2023 21:00)    Daily Weight in k.6 (10 Carlin 2023 07:00)    VITALS:    Vital Signs Last 24 Hrs  T(C): 36.7 (10 Carlin 2023 17:30), Max: 37 (10 Carlin 2023 04:00)  T(F): 98 (10 Carlin 2023 17:30), Max: 98.6 (10 Carlin 2023 04:00)  HR: 120 (10 Carlin 2023 17:15) (68 - 120)  BP: 105/55 (10 Carlin 2023 17:15) (80/40 - 144/92)  BP(mean): 65 (10 Carlin 2023 17:15) (46 - 116)  RR: 32 (10 Carlin 2023 17:15) (14 - 44)  SpO2: 96% (10 Carlin 2023 17:15) (80% - 100%)    Parameters below as of 10 Carlin 2023 07:00  Patient On (Oxygen Delivery Method): room air        LABS:    CBC Full  -  ( 10 Carlin 2023 03:40 )  WBC Count : 15.34 K/uL  RBC Count : 3.22 M/uL  Hemoglobin : 9.7 g/dL  Hematocrit : 29.5 %  Platelet Count - Automated : 132 K/uL  Mean Cell Volume : 91.6 fl  Mean Cell Hemoglobin : 30.1 pg  Mean Cell Hemoglobin Concentration : 32.9 gm/dL  Auto Neutrophil # : 12.42 K/uL  Auto Lymphocyte # : 1.05 K/uL  Auto Monocyte # : 1.24 K/uL  Auto Eosinophil # : 0.07 K/uL  Auto Basophil # : 0.05 K/uL  Auto Neutrophil % : 81.0 %  Auto Lymphocyte % : 6.8 %  Auto Monocyte % : 8.1 %  Auto Eosinophil % : 0.5 %  Auto Basophil % : 0.3 %    PT/INR - ( 2023 21:56 )   PT: 19.4 sec;   INR: 1.62 ratio         PTT - ( 2023 21:56 )  PTT:55.3 sec  06-10    141  |  118<H>  |  65<H>  ----------------------------<  105<H>  3.9   |  13<L>  |  4.79<H>    Ca    8.9      10 Carlin 2023 03:40  Phos  3.1     06-10  Mg     1.5     06-10    TPro  5.7<L>  /  Alb  2.0<L>  /  TBili  0.3  /  DBili  x   /  AST  8<L>  /  ALT  6<L>  /  AlkPhos  90  10    CAPILLARY BLOOD GLUCOSE            LIVER FUNCTIONS - ( 10 Carlin 2023 03:40 )  Alb: 2.0 g/dL / Pro: 5.7 g/dL / ALK PHOS: 90 U/L / ALT: 6 U/L DA / AST: 8 U/L / GGT: x           Creatinine Trend: 4.79<--, 4.73<--, 4.70<--, 4.91<--, 4.82<--, 4.66<--  I&O's Summary    2023 07:  -  10 Carlin 2023 07:00  --------------------------------------------------------  IN: 2971.8 mL / OUT: 1425 mL / NET: 1546.8 mL    10 Carlin 2023 07:01  -  10 Carlin 2023 18:56  --------------------------------------------------------  IN: 1108 mL / OUT: 740 mL / NET: 368 mL        ABG - ( 10 Carlin 2023 04:53 )  pH, Arterial: 7.35  pH, Blood: x     /  pCO2: 21    /  pO2: 123   / HCO3: 12    / Base Excess: -11.8 /  SaO2: 100                 .Blood Blood   @ 10:52   No Growth Final  --  --      .Blood Blood   @ 10:46   No Growth Final  --  --      .Blood Blood-Peripheral   @ 21:09   No Growth Final  --  Blood Culture PCR  Pseudomonas aeruginosa      Clean Catch Clean Catch (Midstream)  - @ 21:02   >=3 organisms. Probable collection contamination.  --  --          MEDICATIONS:    MEDICATIONS  (STANDING):  albumin human 25% IVPB 50 milliLiter(s) IV Intermittent every 8 hours  albuterol    90 MICROgram(s) HFA Inhaler 2 Puff(s) Inhalation every 6 hours  buPROPion XL (24-Hour) . 150 milliGRAM(s) Oral daily  cefepime   IVPB      cefepime   IVPB 1000 milliGRAM(s) IV Intermittent every 12 hours  chlorhexidine 2% Cloths 1 Application(s) Topical <User Schedule>  divalproex  milliGRAM(s) Oral two times a day  dorzolamide 2% Ophthalmic Solution 1 Drop(s) Right EYE <User Schedule>  finasteride 5 milliGRAM(s) Oral daily  levothyroxine 137 MICROGram(s) Oral daily  norepinephrine Infusion 0.05 MICROgram(s)/kG/Min (3.95 mL/Hr) IV Continuous <Continuous>  pantoprazole    Tablet 40 milliGRAM(s) Oral two times a day  pantoprazole  Injectable 40 milliGRAM(s) IV Push every 24 hours  sodium bicarbonate 1300 milliGRAM(s) Oral three times a day  sodium bicarbonate  Infusion 0.134 mEq/kG/Hr (75 mL/Hr) IV Continuous <Continuous>  sucralfate suspension 1 Gram(s) Oral four times a day  tamsulosin 0.4 milliGRAM(s) Oral at bedtime  traZODone 50 milliGRAM(s) Oral at bedtime  vancomycin  IVPB 1250 milliGRAM(s) IV Intermittent every 48 hours  vitamin A &amp; D Ointment 1 Application(s) Topical at bedtime      MEDICATIONS  (PRN):  haloperidol    Injectable 1 milliGRAM(s) IntraMuscular every 6 hours PRN Agitation  ondansetron Injectable 4 milliGRAM(s) IV Push every 8 hours PRN Nausea and/or Vomiting  sodium chloride 0.9% lock flush 10 milliLiter(s) IV Push every 1 hour PRN Pre/post blood products, medications, blood draw, and to maintain line patency      REVIEW OF SYSTEMS:                           ALL ROS DONE [ X   ]    CONSTITUTIONAL:  LETHARGIC [   ], FEVER [   ], UNRESPONSIVE [   ]  CVS:  CP  [   ], SOB, [   ], PALPITATIONS [   ], DIZZYNESS [   ]  RS: COUGH [   ], SPUTUM [   ]  GI: ABDOMINAL PAIN [   ], NAUSEA [   ], VOMITINGS [   ], DIARRHEA [   ], CONSTIPATION [   ]  :  DYSURIA [   ], NOCTURIA [   ], INCREASED FREQUENCY [   ], DRIBLING [   ],  SKELETAL: PAINFUL JOINTS [   ], SWOLLEN JOINTS [   ], NECK ACHE [   ], LOW BACK ACHE [   ],  SKIN : ULCERS [   ], RASH [   ], ITCHING [   ]  CNS: HEAD ACHE [   ], DOUBLE VISION [   ], BLURRED VISION [   ], AMS / CONFUSION [   ], SEIZURES [   ], WEAKNESS [   ],TINGLING / NUMBNESS [   ]    PHYSICAL EXAMINATION:    GENERAL APPEARANCE: NO DISTRESS  HEENT:  NO PALLOR, NO  JVD,  NO   NODES, NECK SUPPLE  CVS: S1 +, S2 +,   RS: AEEB,  OCCASIONAL  RALES +,   NO RONCHI  ABD: SOFT, NT, NO, BS +  EXT: PE+  SKIN: WARM,   SKELETAL:  ROM REDUCED AT CERVICAL & LS SPINE  CNS:  AAO X 2-3      RADIOLOGY :    RADIOLOGY AND READINGS REVIEWED    ASSESSMENT :       PLAN:  HPI:  64 y/o M, A&Ox3, bedbound, chronic daley catheter with CKD (baseline creat 3.8), COPD, CHFpEF, BPH, PVD, Lymphedema, decubitus ulcers (Sacrum/gluteal) , Anemia, poly-neuropathy, poly-arthritis, hypothyroidism, seizures and Bipolar disorder was BIB EMS from [place] for abdominal pain, [place]. Mr. Rhoades states that he developed acute onset sharp abdominal pain 4 days ago a/w "brown" emesis (witnessed by EMS). Unable to tolerate food/drink x 4 days. Having normal bowel movements, last this morning . Denies any fevers. NH paperwork states concern for obstruction.  Patient endorses BLOODY EMESIS x 4 days upon further questioning. (29 May 2023 01:36)      # [6/] - RRT CALLED FOR HYPOTENSION, FEVER AND ANEMIA - PATIENT GIVEN PRBC TRANSFUSION, VANCOMYCIN ADDED, TRANSFERRED FROM SCU TO ICU    # RESOLVING SEPSIS S/T P.AERUGINOSA BACTEREMIA + COMPLICATED UTI    - ON CEFEPIME, BCX [P. AERUGINOSA] AND UCX [ > 3 ORGANISMS], REPEAT BCX - NGTD  - ID CONSULT  - CRITICAL CARE EVALUATION IN PROGRESS    - PLACED ON VASOPRESSORS  - VANCOMYCIN + CEFEPIME, F/U REPEAT BCX    # RESOLVING GI BLEED  # ESOPHAGITIS  # ANEMIA OF CHRONIC DISEASE   # THROMBOCYTOPENIA  - MONITOR HGB, TRANSFUSION THRESHOLD HGB < 8  - TREND PLT  - S/P EGD - ULCERATIVE ESOPHAGITIS  - PPI BID  - CARAFATE QID  - ADVANCING DIET PER GI RECOMMENDATION   - GI CONSULT  - HEME/ONC CONSULT    - [] - EPISODE OF NAUSEA/VOMITING - MONITORING CLOSELY, PRN ANTIEMETICS    - [6/10] - GIVEN PRBC TRANSFUSION, PLANNED FOR 1 MORE UNIT OF PRBC, PLT AND FFP, DEPAKOTE STOPPED GIVEN THROMBOCYTOPENIA    # ANEMIA DUE TO GI BLEED, ANEMIA OF CKD     # CHRONIC HYPOXIC RESPIRATORY FAILURE S/T UNDERLYING COPD  - ON PRN O2  - CRITICAL CARE EVALUATION IN PROGRESS    # AMS DUE TO ACUTE METABOLIC ENCEPHALOPATHY    # SHEYLA ON CKD STAGE 4  - WORSENING  # CHRONIC DALEY, BPH  - DALEY   - STRICT IS AND OS  - AVOID NEPHROTOXIC AGENTS  - MONITOR CR  - NEPHROLOGY CONSULT    - [] - URINARY RETENTION OVERNIGHT - DALEY PLACED    # AMBULATORY DYSFUNCTION, IMPAIRED GAIT DUE TO GENERALIZED MUSCLE WEAKNESS, CERVICAL & LS SPINAL STENOSIS, POLYARTRHITIS & PERIPHERAL NEUROPATHY. OP  - FALL PRECAUTIONS    # DYSPHAGIA DUE TO METABOLIC ENCEPHALOPATHY - ON MODIFIED DIET PER SWALLOW EVAL    # HX OF SEIZURE DX  - ON VALPROIC ACID    # HYPOTHYROIDISM - ON LEVOTHYROXINE    # PRESSURE ULCERS  - PATIENT IS HIGH RISK FOR PRESSURE ULCERS DESPITE PREVENTIVE MEASURES IN PLACE  - WOUND CARE CONSULT    # GI PPX

## 2023-06-10 NOTE — CONSULT NOTE ADULT - SUBJECTIVE AND OBJECTIVE BOX
63 year old Male, from [place], [facility], ambulates with walker , chronic FC, h/o obesity, HTN, HLD, PVD, Seizures, CKD4 (base SCr 3.8), anemia, BPH, Lymphedema, hypothyroidism, HFpEF, possible COPD, decubitus ulcer (sacrum/gluteal), polyneuropathy, polyarthritis, bipolar disorder, presented to ED for abdominal pain, nausea and vomiting brown emesis. Admitted to ICU for Hypotension likely from Sepsis secondary to complicated UTI and esophagitis.     Alert , oriented, in no distress  Lungs: clear  Cor :RR  Abdomen: Benign  Extremities: No ediam      WBC Count: 15.34 K/uL  RBC Count: 3.22 M/uL  Hemoglobin: 9.7 g/dL  Hematocrit: 29.5 %  Mean Cell Volume: 91.6: tx history 6-9-6-10 fl  Mean Cell Hemoglobin: 30.1 pg  Mean Cell Hemoglobin Conc: 32.9 gm/dL  Red Cell Distrib Width: 18.3 %  Platelet Count - Automated: 132 K/uL  Auto Neutrophil #: 12.42 K/uL  Auto Lymphocyte #: 1.05 K/uL  Auto Monocyte #: 1.24 K/uL  Auto Eosinophil #: 0.07 K/uL  Auto Basophil #: 0.05 K/uL  Auto Neutrophil %: 81.0: Differential percentages must be correlated with absolute numbers for   clinical significance. %  Auto Lymphocyte %: 6.8 %  Auto Monocyte %: 8.1 %  Auto Eosinophil %: 0.5 %  Auto Basophil %: 0.3 %  Auto Immature Granulocyte %: 3.3: (Includes meta, myelo and promyelocytes). Mild elevations in immatur

## 2023-06-10 NOTE — PROGRESS NOTE ADULT - ASSESSMENT
Assessment  - 63 year old Male, from [place], AOx3, ambulates with walker , chronic FC, h/o obesity, HTN, HLD, PVD, Seizures, CKD4 (base SCr 3.8), anemia, BPH, Lymphedema, hypothyroidism, HFpEF, possible COPD, decubitus ulcer (sacrum/gluteal), polyneuropathy, polyarthritis, bipolar disorder, presented to ED for abdominal pain, nausea and vomiting brown emesis. Admitted to ICU for Hypotension likely from Sepsis secondary to complicated UTI and esophagitis. ICU consulted to     Plan  ====Neuro====  #Baseline mentation: AAOx2   - at baseline    #Seizure  - depakote d/c on 6/9  - restarting on depakote  - seizure precautions    #Bipolar disorder.   - cont. w/ Wellbutrin, Trazadone  - Psych following  - Depakote discontinued in the setting of thrombocytopenia    #Failure to thrive  - Refusing to eat and participate in medical tx.   - Psych and Palliative involved    ====CVS====  #Sepsis 2/2 pseudomonas bacteremia and UTI  - was already on cefepime (last day 6/10 to complete a total of 14 days)  - s/p RRT for hypotension, given 2L IVF  - cont w/ cefepime  - f/u Bcx and UA    #Thrombocytopenia  - HIT panel negative  - f/u DIC panel  - heme/onc consulted    ====Pulm====  #AHRF  - likely 2/2 acute anemia and/or hypotension  - cont with NC   - monitor O2 sat and titrate off as indicated    ====GI====  #Diet: regular  #Bowel regimen: none    #Ulcerative esophagitis  - cont. w/ PPI and Carafate    ====Renal====  #SHEYLA on CKD  - monitor Cr  - plan for HD, if no improvement, but no need for urgent HD  - nephro following    #BPH  - cont with finasteride and tamsulosin  - on daley    ====ID====  #Sepsis  - see "sepsis" above    ====Heme/Onc====  #acute anemia  - likely 2/2 anemia or chronic disease +/- dilutional   - h/o suspected GIB, s/p EGD r/o'd GIB  - cbc 5.3  - transfusing 2u pRBC  - f/u hemolytic work up    ====Endo====  #bG goal: 140-180  - ISS for poor oral intake    #Hypothyroid.   - cont with synthroid    ====Skin/lines====  #peripheral: 2 peripherals in each arm  #CVC: none  #daley: yes    ====Ppx====  #DVT: hold given acute anemia  #GI: PPI    Assessment  - 63 year old Male, from [place], AOx3, ambulates with walker , chronic FC, h/o obesity, HTN, HLD, PVD, Seizures, CKD4 (base SCr 3.8), anemia, BPH, Lymphedema, hypothyroidism, HFpEF, possible COPD, decubitus ulcer (sacrum/gluteal), polyneuropathy, polyarthritis, bipolar disorder, presented to ED for abdominal pain, nausea and vomiting brown emesis. Admitted to ICU for Hypotension likely from Sepsis secondary to complicated UTI and esophagitis. Readmitted to ICU status post RRT for hypotension and concern for evolving septic shock.    Plan  ====Neuro====  #Baseline mentation: AAOx2   - lethargic, likely due to metabolic encephalopathy in setting of shock state    #Seizure  - depakote d/c on 6/9 in the setting of thrombocytopenia  - seizure precautions    #Bipolar disorder.   - cont. w/ Wellbutrin, Trazadone  - Psych following  - Depakote discontinued in the setting of thrombocytopenia    #Failure to thrive  - Refusing to eat and participate in medical tx.   - Psych and Palliative involved    ====CVS====  #Sepsis 2/2 pseudomonas bacteremia and UTI; concern for ?PNA  - was already on cefepime (last day 6/10 to complete a total of 14 days)  - blood cultures from 6/1 were cleared  - s/p RRT for hypotension, given 2L IVF  - on levophed for BP support  - continue cefepime for now to complete course  - f/u sputum culture 6/10  - f/u Bcx 6/10 and UA   - ID following, f/u ID recs      ====Pulm====  #AHRF  - likely 2/2 acute anemia and/or hypotension  - cont with NC   - monitor O2 sat and titrate off as indicated    ====GI====  #Diet: NPO  #Bowel regimen: none    #Ulcerative esophagitis  - cont. w/ PPI and Carafate    ====Renal====  #SHEYLA on CKD  - Cr uptrending  - monitor BMP and UOP  - replete elctrolytes prn, replete K if less than 3  - plan for HD, if no improvement, but no need for urgent HD  - nephro following    #BPH  - cont with finasteride and tamsulosin  - on daley    ====ID====  #Sepsis  - see "sepsis" above    ====Heme/Onc====  #thrombocytopenia in the setting of septic shock  -HIT panel negative  -fibrinogen low, PTT elevated, but d-dimer low; inconsistent with DIC  -depakote held  -follow CBC  -keep > 50  - heme/onc following : QMA    #anemia  -h/o suspected GIB, s/p EGD; negative for GIB  -s/p 2 units PRBCs with appropriate response  6.8> 9.7  - no evidence of hemolysis, hapto and bili WNL  -follow CBC  -transfuse < 7        ====Endo====  #bG goal: 140-180  - ISS for poor oral intake    #Hypothyroidism   - continue synthroid 137mcg daily    ====Skin/lines====  #peripheral: 2 peripherals in each arm  #CVC: none  #daley: yes    ====Ppx====  #DVT: hold given acute anemia  #GI: PPI

## 2023-06-10 NOTE — CONSULT NOTE ADULT - ASSESSMENT
Not septic, though mild increase in WBC  Platelets improving  Blood picture appears reactive  Continue to trend CBC  We"ll F/U

## 2023-06-10 NOTE — PROGRESS NOTE ADULT - SUBJECTIVE AND OBJECTIVE BOX
GAYLA PEARCE  63y  Patient is a 63y old  Male who presents with a chief complaint of sepsis (10 Carlin 2023 13:19)    HPI:  Seen and examined in ICU. Admitted overnight for GI bleed and Hypotension.  Transfused and started on Vasopressors. Alert and awake.    HEALTH ISSUES - PROBLEM Dx:  GIB (gastrointestinal bleeding)    Sepsis    Anemia    Hypothyroid    CKD (chronic kidney disease)    BPH (benign prostatic hyperplasia)    Bipolar disorder    Advance care planning    Seizure    Esophagitis    Hypokalemia    Ambulatory dysfunction    Prophylactic measure    Electrolyte depletion          MEDICATIONS  (STANDING):  albumin human 25% IVPB 50 milliLiter(s) IV Intermittent every 8 hours  albuterol    90 MICROgram(s) HFA Inhaler 2 Puff(s) Inhalation every 6 hours  buPROPion XL (24-Hour) . 150 milliGRAM(s) Oral daily  cefepime   IVPB      cefepime   IVPB 1000 milliGRAM(s) IV Intermittent every 12 hours  chlorhexidine 2% Cloths 1 Application(s) Topical <User Schedule>  divalproex  milliGRAM(s) Oral two times a day  dorzolamide 2% Ophthalmic Solution 1 Drop(s) Right EYE <User Schedule>  finasteride 5 milliGRAM(s) Oral daily  levothyroxine 137 MICROGram(s) Oral daily  norepinephrine Infusion 0.05 MICROgram(s)/kG/Min (3.95 mL/Hr) IV Continuous <Continuous>  pantoprazole    Tablet 40 milliGRAM(s) Oral two times a day  pantoprazole  Injectable 40 milliGRAM(s) IV Push every 24 hours  sodium bicarbonate 1300 milliGRAM(s) Oral three times a day  sodium bicarbonate  Infusion 0.134 mEq/kG/Hr (75 mL/Hr) IV Continuous <Continuous>  sucralfate suspension 1 Gram(s) Oral four times a day  tamsulosin 0.4 milliGRAM(s) Oral at bedtime  traZODone 50 milliGRAM(s) Oral at bedtime  vancomycin  IVPB 1250 milliGRAM(s) IV Intermittent every 48 hours  vitamin A &amp; D Ointment 1 Application(s) Topical at bedtime    MEDICATIONS  (PRN):  haloperidol    Injectable 1 milliGRAM(s) IntraMuscular every 6 hours PRN Agitation  ondansetron Injectable 4 milliGRAM(s) IV Push every 8 hours PRN Nausea and/or Vomiting  sodium chloride 0.9% lock flush 10 milliLiter(s) IV Push every 1 hour PRN Pre/post blood products, medications, blood draw, and to maintain line patency    Vital Signs Last 24 Hrs  T(C): 37 (10 Carlin 2023 04:00), Max: 37 (10 Carlin 2023 04:00)  T(F): 98.6 (10 Carlin 2023 04:00), Max: 98.6 (10 Carlin 2023 04:00)  HR: 88 (10 Carlin 2023 13:30) (68 - 115)  BP: 132/66 (10 Carlin 2023 13:30) (80/40 - 144/92)  BP(mean): 80 (10 Carlin 2023 13:30) (46 - 109)  RR: 25 (10 Carlin 2023 13:30) (14 - 36)  SpO2: 100% (10 Carlin 2023 13:) (80% - 100%)    Parameters below as of 10 Carlin 2023 07:  Patient On (Oxygen Delivery Method): room air      Daily Height in cm: 172.7 (2023 21:00)    Daily Weight in k.6 (10 Carlin 2023 07:00)    PHYSICAL EXAM:  Constitutional:  he appears comfortable and not distressed. Not diaphoretic.    Neck:  The thyroid is normal. Trachea is midline. Triple lumen catheter to left IJ.    Breasts: Normal examination.    Respiratory: The lungs are clear to auscultation. No dullness and expansion is normal.    Cardiovascular: S1 and S2 are normal. No mummurs, rubs or gallops are present.    Gastrointestinal: The abdomen is soft. No tenderness is present. No masses are present. Bowel sounds are normal.    Genitourinary: The bladder is not distended. No CVA tenderness is present.    Extremities: No edema is noted. No deformities are present.    Neurological: Cognition is normal. Tone, power and sensation are normal. Gait is steady.    Skin: No leasions are seen  or palpated.    Lymph Nodes: No lymphadenopathy is present.    Psychiatric: Flat affect. No hallucinations or flight of ideas are noted.    I&O's Summary    2023 07:01  -  10 Carlin 2023 07:00  --------------------------------------------------------  IN: 2971.8 mL / OUT: 1425 mL / NET: 1546.8 mL    10 Carlin 2023 07:01  -  10 Carlin 2023 14:36  --------------------------------------------------------  IN: 454 mL / OUT: 415 mL / NET: 39 mL                              9.7    15.34 )-----------( 132      ( 10 Carlin 2023 03:40 )             29.5   Hemoglobin: 9.7 g/dL (06.10.23 @ 03:40)   Hemoglobin: 6.8    06-10    141  |  118<H>  |  65<H>  ----------------------------<  105<H>  3.9   |  13<L>  |  4.79<H>    Ca    8.9      10 Carlin 2023 03:40  Phos  3.1     06-10  Mg     1.5     06-10    TPro  5.7<L>  /  Alb  2.0<L>  /  TBili  0.3  /  DBili  x   /  AST  8<L>  /  ALT  6<L>  /  AlkPhos  90  06-10

## 2023-06-10 NOTE — PROGRESS NOTE ADULT - SUBJECTIVE AND OBJECTIVE BOX
INTERVAL HPI/OVERNIGHT EVENTS: Patient was examined at bedside, AAOx2-3, stable, NAD. Had episodes of hypothermia overnight. Started on warming blanket. No other acute events overnight.    PRESSORS: [ ] YES [ ] NO  WHICH:    ANTIBIOTICS:                      Antimicrobial:  cefepime   IVPB      cefepime   IVPB 1000 milliGRAM(s) IV Intermittent every 12 hours  vancomycin  IVPB 1250 milliGRAM(s) IV Intermittent every 48 hours    Cardiovascular:  norepinephrine Infusion 0.05 MICROgram(s)/kG/Min IV Continuous <Continuous>    Pulmonary:  albuterol    90 MICROgram(s) HFA Inhaler 2 Puff(s) Inhalation every 6 hours    Hematalogic:    Other:  albumin human 25% IVPB 50 milliLiter(s) IV Intermittent every 8 hours  buPROPion XL (24-Hour) . 150 milliGRAM(s) Oral daily  chlorhexidine 2% Cloths 1 Application(s) Topical <User Schedule>  chlorhexidine 4% Liquid 1 Application(s) Topical <User Schedule>  divalproex  milliGRAM(s) Oral two times a day  dorzolamide 2% Ophthalmic Solution 1 Drop(s) Right EYE <User Schedule>  finasteride 5 milliGRAM(s) Oral daily  haloperidol    Injectable 1 milliGRAM(s) IntraMuscular every 6 hours PRN  levothyroxine 137 MICROGram(s) Oral daily  ondansetron Injectable 4 milliGRAM(s) IV Push every 8 hours PRN  pantoprazole    Tablet 40 milliGRAM(s) Oral two times a day  potassium chloride  20 mEq/100 mL IVPB 20 milliEquivalent(s) IV Intermittent every 2 hours  sodium bicarbonate 1300 milliGRAM(s) Oral three times a day  sodium chloride 0.9% lock flush 10 milliLiter(s) IV Push every 1 hour PRN  sucralfate suspension 1 Gram(s) Oral four times a day  tamsulosin 0.4 milliGRAM(s) Oral at bedtime  traZODone 50 milliGRAM(s) Oral at bedtime  vitamin A &amp; D Ointment 1 Application(s) Topical at bedtime    albumin human 25% IVPB 50 milliLiter(s) IV Intermittent every 8 hours  albuterol    90 MICROgram(s) HFA Inhaler 2 Puff(s) Inhalation every 6 hours  buPROPion XL (24-Hour) . 150 milliGRAM(s) Oral daily  cefepime   IVPB      cefepime   IVPB 1000 milliGRAM(s) IV Intermittent every 12 hours  chlorhexidine 2% Cloths 1 Application(s) Topical <User Schedule>  chlorhexidine 4% Liquid 1 Application(s) Topical <User Schedule>  divalproex  milliGRAM(s) Oral two times a day  dorzolamide 2% Ophthalmic Solution 1 Drop(s) Right EYE <User Schedule>  finasteride 5 milliGRAM(s) Oral daily  haloperidol    Injectable 1 milliGRAM(s) IntraMuscular every 6 hours PRN  levothyroxine 137 MICROGram(s) Oral daily  norepinephrine Infusion 0.05 MICROgram(s)/kG/Min IV Continuous <Continuous>  ondansetron Injectable 4 milliGRAM(s) IV Push every 8 hours PRN  pantoprazole    Tablet 40 milliGRAM(s) Oral two times a day  potassium chloride  20 mEq/100 mL IVPB 20 milliEquivalent(s) IV Intermittent every 2 hours  sodium bicarbonate 1300 milliGRAM(s) Oral three times a day  sodium chloride 0.9% lock flush 10 milliLiter(s) IV Push every 1 hour PRN  sucralfate suspension 1 Gram(s) Oral four times a day  tamsulosin 0.4 milliGRAM(s) Oral at bedtime  traZODone 50 milliGRAM(s) Oral at bedtime  vancomycin  IVPB 1250 milliGRAM(s) IV Intermittent every 48 hours  vitamin A &amp; D Ointment 1 Application(s) Topical at bedtime    Drug Dosing Weight  Height (cm): 172.7 (09 Jun 2023 21:00)  Weight (kg): 84.2 (09 Jun 2023 21:00)  BMI (kg/m2): 28.2 (09 Jun 2023 21:00)  BSA (m2): 1.98 (09 Jun 2023 21:00)    CENTRAL LINE: [ ] YES [ ] NO  LOCATION:   DATE INSERTED:  REMOVE: [ ] YES [ ] NO  EXPLAIN:    HATFIELD: [ ] YES [ ] NO    DATE INSERTED:  REMOVE:  [ ] YES [ ] NO  EXPLAIN:    A-LINE:  [ ] YES [ ] NO  LOCATION:   DATE INSERTED:  REMOVE:  [ ] YES [ ] NO  EXPLAIN:    PMH -reviewed admission note, no change since admission    ICU Vital Signs Last 24 Hrs  T(C): 35.4 (10 Carlin 2023 00:48), Max: 36.4 (09 Jun 2023 06:15)  T(F): 95.7 (10 Carlin 2023 00:48), Max: 97.6 (09 Jun 2023 06:15)  HR: 98 (10 Carlin 2023 02:05) (67 - 108)  BP: 117/71 (10 Carlin 2023 02:00) (80/40 - 144/92)  BP(mean): 77 (10 Carlin 2023 02:00) (46 - 109)  ABP: --  ABP(mean): --  RR: 28 (10 Carlin 2023 02:05) (14 - 36)  SpO2: 100% (10 Carlin 2023 02:05) (80% - 100%)    O2 Parameters below as of 10 Carlin 2023 02:00  Patient On (Oxygen Delivery Method): nasal cannula  O2 Flow (L/min): 2          ABG - ( 10 Carlin 2023 00:10 )  pH, Arterial: 7.22  pH, Blood: x     /  pCO2: 27    /  pO2: 141   / HCO3: 11    / Base Excess: -15.0 /  SaO2: 100                   06-08 @ 07:01  -  06-09 @ 07:00  --------------------------------------------------------  IN: 0 mL / OUT: 400 mL / NET: -400 mL            PHYSICAL EXAM:    GENERAL: NAD, well-groomed, well-developed  HEAD:  Atraumatic, Normocephalic  EYES: EOMI, PERRLA, conjunctiva and sclera clear  ENMT: No tonsillar erythema, exudates, or enlargement; Moist mucous membranes, Good dentition, No lesions  NECK: Supple, normal appearance, No JVD; Normal thyroid; Trachea midline  NERVOUS SYSTEM:  Alert & Oriented X3, Good concentration; Motor Strength 5/5 B/L upper and lower extremities; DTRs 2+ intact and symmetric  CHEST/LUNG: No chest deformity; Normal percussion bilaterally; No rales, rhonchi, wheezing   HEART: Regular rate and rhythm; No murmurs, rubs, or gallops  ABDOMEN: Soft, Nontender, Nondistended; Bowel sounds present  EXTREMITIES:  2+ Peripheral Pulses, No clubbing, cyanosis, or edema  LYMPH: No lymphadenopathy noted  SKIN: No rashes or lesions; Good capillary refill      LABS:  CBC Full  -  ( 09 Jun 2023 20:50 )  WBC Count : 8.47 K/uL  RBC Count : 2.19 M/uL  Hemoglobin : 6.8 g/dL  Hematocrit : 21.7 %  Platelet Count - Automated : 90 K/uL  Mean Cell Volume : 99.1 fl  Mean Cell Hemoglobin : 31.1 pg  Mean Cell Hemoglobin Concentration : 31.3 gm/dL  Auto Neutrophil # : x  Auto Lymphocyte # : x  Auto Monocyte # : x  Auto Eosinophil # : x  Auto Basophil # : x  Auto Neutrophil % : x  Auto Lymphocyte % : x  Auto Monocyte % : x  Auto Eosinophil % : x  Auto Basophil % : x    06-09    141  |  117<H>  |  64<H>  ----------------------------<  86  2.9<LL>   |  13<L>  |  4.73<H>    Ca    8.5      09 Jun 2023 20:50  Phos  2.0     06-09  Mg     1.6     06-09    TPro  4.6<L>  /  Alb  1.6<L>  /  TBili  0.2  /  DBili  <0.1  /  AST  3<L>  /  ALT  <6<L>  /  AlkPhos  74  06-09    PT/INR - ( 09 Jun 2023 21:56 )   PT: 19.4 sec;   INR: 1.62 ratio         PTT - ( 09 Jun 2023 21:56 )  PTT:55.3 sec        RADIOLOGY & ADDITIONAL STUDIES REVIEWED:  ***    GOALS OF CARE DISCUSSION WITH PATIENT/FAMILY/PROXY:    CRITICAL CARE TIME SPENT: 35 minutes INTERVAL HPI/OVERNIGHT EVENTS: Patient was examined at bedside, AAOx2-3, stable, NAD. Had episodes of hypothermia overnight. Started on warming blanket. No other acute events overnight.    PRESSORS: [ ] YES [ ] NO  WHICH:    ANTIBIOTICS:                      Antimicrobial:  cefepime   IVPB      cefepime   IVPB 1000 milliGRAM(s) IV Intermittent every 12 hours  vancomycin  IVPB 1250 milliGRAM(s) IV Intermittent every 48 hours    Cardiovascular:  norepinephrine Infusion 0.05 MICROgram(s)/kG/Min IV Continuous <Continuous>    Pulmonary:  albuterol    90 MICROgram(s) HFA Inhaler 2 Puff(s) Inhalation every 6 hours    Hematalogic:    Other:  albumin human 25% IVPB 50 milliLiter(s) IV Intermittent every 8 hours  buPROPion XL (24-Hour) . 150 milliGRAM(s) Oral daily  chlorhexidine 2% Cloths 1 Application(s) Topical <User Schedule>  chlorhexidine 4% Liquid 1 Application(s) Topical <User Schedule>  divalproex  milliGRAM(s) Oral two times a day  dorzolamide 2% Ophthalmic Solution 1 Drop(s) Right EYE <User Schedule>  finasteride 5 milliGRAM(s) Oral daily  haloperidol    Injectable 1 milliGRAM(s) IntraMuscular every 6 hours PRN  levothyroxine 137 MICROGram(s) Oral daily  ondansetron Injectable 4 milliGRAM(s) IV Push every 8 hours PRN  pantoprazole    Tablet 40 milliGRAM(s) Oral two times a day  potassium chloride  20 mEq/100 mL IVPB 20 milliEquivalent(s) IV Intermittent every 2 hours  sodium bicarbonate 1300 milliGRAM(s) Oral three times a day  sodium chloride 0.9% lock flush 10 milliLiter(s) IV Push every 1 hour PRN  sucralfate suspension 1 Gram(s) Oral four times a day  tamsulosin 0.4 milliGRAM(s) Oral at bedtime  traZODone 50 milliGRAM(s) Oral at bedtime  vitamin A &amp; D Ointment 1 Application(s) Topical at bedtime    albumin human 25% IVPB 50 milliLiter(s) IV Intermittent every 8 hours  albuterol    90 MICROgram(s) HFA Inhaler 2 Puff(s) Inhalation every 6 hours  buPROPion XL (24-Hour) . 150 milliGRAM(s) Oral daily  cefepime   IVPB      cefepime   IVPB 1000 milliGRAM(s) IV Intermittent every 12 hours  chlorhexidine 2% Cloths 1 Application(s) Topical <User Schedule>  chlorhexidine 4% Liquid 1 Application(s) Topical <User Schedule>  divalproex  milliGRAM(s) Oral two times a day  dorzolamide 2% Ophthalmic Solution 1 Drop(s) Right EYE <User Schedule>  finasteride 5 milliGRAM(s) Oral daily  haloperidol    Injectable 1 milliGRAM(s) IntraMuscular every 6 hours PRN  levothyroxine 137 MICROGram(s) Oral daily  norepinephrine Infusion 0.05 MICROgram(s)/kG/Min IV Continuous <Continuous>  ondansetron Injectable 4 milliGRAM(s) IV Push every 8 hours PRN  pantoprazole    Tablet 40 milliGRAM(s) Oral two times a day  potassium chloride  20 mEq/100 mL IVPB 20 milliEquivalent(s) IV Intermittent every 2 hours  sodium bicarbonate 1300 milliGRAM(s) Oral three times a day  sodium chloride 0.9% lock flush 10 milliLiter(s) IV Push every 1 hour PRN  sucralfate suspension 1 Gram(s) Oral four times a day  tamsulosin 0.4 milliGRAM(s) Oral at bedtime  traZODone 50 milliGRAM(s) Oral at bedtime  vancomycin  IVPB 1250 milliGRAM(s) IV Intermittent every 48 hours  vitamin A &amp; D Ointment 1 Application(s) Topical at bedtime    Drug Dosing Weight  Height (cm): 172.7 (09 Jun 2023 21:00)  Weight (kg): 84.2 (09 Jun 2023 21:00)  BMI (kg/m2): 28.2 (09 Jun 2023 21:00)  BSA (m2): 1.98 (09 Jun 2023 21:00)    CENTRAL LINE: [ ] YES [ ] NO  LOCATION:   DATE INSERTED:  REMOVE: [ ] YES [ ] NO  EXPLAIN:    HATFIELD: [ ] YES [ ] NO    DATE INSERTED:  REMOVE:  [ ] YES [ ] NO  EXPLAIN:    A-LINE:  [ ] YES [ ] NO  LOCATION:   DATE INSERTED:  REMOVE:  [ ] YES [ ] NO  EXPLAIN:    PMH -reviewed admission note, no change since admission    ICU Vital Signs Last 24 Hrs  T(C): 35.4 (10 Carlin 2023 00:48), Max: 36.4 (09 Jun 2023 06:15)  T(F): 95.7 (10 Carlin 2023 00:48), Max: 97.6 (09 Jun 2023 06:15)  HR: 98 (10 Carlin 2023 02:05) (67 - 108)  BP: 117/71 (10 Carlin 2023 02:00) (80/40 - 144/92)  BP(mean): 77 (10 Carlin 2023 02:00) (46 - 109)  ABP: --  ABP(mean): --  RR: 28 (10 Carlin 2023 02:05) (14 - 36)  SpO2: 100% (10 Carlin 2023 02:05) (80% - 100%)    O2 Parameters below as of 10 Carlin 2023 02:00  Patient On (Oxygen Delivery Method): nasal cannula  O2 Flow (L/min): 2          ABG - ( 10 Carlin 2023 00:10 )  pH, Arterial: 7.22  pH, Blood: x     /  pCO2: 27    /  pO2: 141   / HCO3: 11    / Base Excess: -15.0 /  SaO2: 100                   06-08 @ 07:01  -  06-09 @ 07:00  --------------------------------------------------------  IN: 0 mL / OUT: 400 mL / NET: -400 mL            PHYSICAL EXAM:  GENERAL: lethargic, slow to respond  HEAD: Atraumatic, Normocephalic  EYES: EOMI, PERRLA, conjunctiva and sclera clear  ENMT: Dry mucous membranes  NECK: trachea midline, neck Supple  NERVOUS SYSTEM: AAOx2.  Follows simple commands.    CHEST/LUNG: Diminished bilaterally R>L.  No rales, rhonchi, wheezing, or rubs. On 2 L NC  HEART: Regular rate and rhythm; No murmurs, rubs, or gallops  ABDOMEN: Soft, Nontender, Nondistended; Bowel sounds present  : Hatfield in place, draining to gravity   EXTREMITIES:  1+ Peripheral Pulses, No clubbing, cyanosis, +1 pitting edema lower exts.   SKIN: Stage 2 with DTI on Sacrum and Discoloration of lower extremities.       LABS:  CBC Full  -  ( 09 Jun 2023 20:50 )  WBC Count : 8.47 K/uL  RBC Count : 2.19 M/uL  Hemoglobin : 6.8 g/dL  Hematocrit : 21.7 %  Platelet Count - Automated : 90 K/uL  Mean Cell Volume : 99.1 fl  Mean Cell Hemoglobin : 31.1 pg  Mean Cell Hemoglobin Concentration : 31.3 gm/dL  Auto Neutrophil # : x  Auto Lymphocyte # : x  Auto Monocyte # : x  Auto Eosinophil # : x  Auto Basophil # : x  Auto Neutrophil % : x  Auto Lymphocyte % : x  Auto Monocyte % : x  Auto Eosinophil % : x  Auto Basophil % : x    06-09    141  |  117<H>  |  64<H>  ----------------------------<  86  2.9<LL>   |  13<L>  |  4.73<H>    Ca    8.5      09 Jun 2023 20:50  Phos  2.0     06-09  Mg     1.6     06-09    TPro  4.6<L>  /  Alb  1.6<L>  /  TBili  0.2  /  DBili  <0.1  /  AST  3<L>  /  ALT  <6<L>  /  AlkPhos  74  06-09    PT/INR - ( 09 Jun 2023 21:56 )   PT: 19.4 sec;   INR: 1.62 ratio         PTT - ( 09 Jun 2023 21:56 )  PTT:55.3 sec        RADIOLOGY & ADDITIONAL STUDIES REVIEWED:  ***    GOALS OF CARE DISCUSSION WITH PATIENT/FAMILY/PROXY:    CRITICAL CARE TIME SPENT: 35 minutes

## 2023-06-10 NOTE — PROGRESS NOTE ADULT - ATTENDING COMMENTS
IMP: This is a 63 year old man , from [place], AOx3, ambulates with walker , chronic FC, h/o obesity, HTN, HLD, PVD, Seizures, CKD4 (base SCr 3.8), anemia, BPH, Lymphedema, hypothyroidism, HFpEF, possible COPD, decubitus ulcer (sacrum/gluteal), polyneuropathy, polyarthritis, bipolar disorder. Patient presented to ED for abdominal pain, nausea and vomiting brown emesis. Patient admitted to ICU for Hypotension likely from Sepsis secondary to complicated UTI and esophagitis. CT head was negative for acute changes. CT abd showed circumferential wall thickening of distal esophagus. GI consulted recommending endoscopy which took place on 5/30 with findings of ulcerative esophagitis. On PPI.   Blood cultures from 5/28 + for P. aeruginasa. Ucx likely contaminant. On cefepime.  Patient with pressure injury. Wound care consult. Nephrology following for SHEYLA on CKD.   06/01: Repeat blood cx pending. Advance diet as tolerated. PT reccs TG (5/30). Plan to return to [place] once repeat Bcx come back. C/w Cefepime for complicated UTI and P. Aeruginosa bacteriemia. Afebrile. BP soft this AM.    06/02: Repeat blood cx pending. On Cefepime. PT reccs TG. Dispo ([place]). Hemodynamically stable. Plan for OOB to chair.   06/03: Repeat Bcx from 6/1 NGTD. Afebrile. On Cefepime. ID following. Hemodynamically stable. Plan to advance diet to full liquids.   6/4: Patient with tachycardia secondary to frustration from not being able to be discharge. Emotional support given, encouraged to verbalize concerns, explained in full the plan of care. Patient VS became stable, will hold off on EKG as isolated event.   06/07: Urinary retention with Viera insertion overnight. No acceptance from chosen facilities. Bcx NGTD. Plan to d/c on Cipro and Maxepime for a total of 14 days per ID. SCr increased to 4.80 from 4.48. Hypokalemic this AM. Replacement in progress. Titrating O2.   06/08: Costa in WBC. Afebrile. Soft BP yesterday with SBP in the 90s.  C/o SOB this AM. See assessment above. CXR ordered and BNP. Patient refuses to participate in PT. Will attempt again today. Plan to D/C to [facility] when optimized.   06/09: Lethargic. Thrombocytopenia Discontinued Depakote. Discussed with psych as it could be causing drop on plt. HIT panel negative. Hem/Onc consulted. Soft BP. Orthostatic hypotension. Midodrine started. IVF. Electrolyte derangements Replacement in progress. CXR shows possible infiltrate RLL. on 2L NC. Afebrile. Worsening SHEYLA. Discussed wit hnephro. No plan for HD at this time. Will continue to monitor. Patient has Viera and making urine.   Transferred to  ICU for Hypotension likely from Sepsis secondary to complicated UTI and esophagitis. Noted to have low H/H but no overt sign of bleeding s/p 1 unit PRBC. AMS due to encephalopathy       Sugg:  - O2 supp as needed   - No sedation   - AMS due to encephalopathy   - Hold depakote due to thrombocytopenia   - Hemodynamic monitoring   - Albumin iv   - NPO   - Transfuse 1 unit PRBC  1 FFP and 1 PLT   - Monitor CBC  - If H/H continue to decrease , will need CT chest abd and pelvis   - Continue antibx   - Monitor vanco serum level   - Viera cath   - Bicarb gtt   - Heme eval noted .. not DIC   - Pat is refusing to participate in care   - Psy f/u

## 2023-06-10 NOTE — PROGRESS NOTE ADULT - ASSESSMENT
SHEYLA: Cr is rising again. S/P shock and bleeding yesterday.  On Vasopressors and Urine Output is > 50 mL/hour.  - Follow up BMP.  - MAP > 65.  - Maintain daley for now.  - HD if no improvement.  - No need for urgent HD at this time.      Metabolic Acidosis:  Non AG, likely Distal RTA.  Associated with relatively low K.  - Continue HCO3 infusion.  - Monitor for worsening Potassium wasting and Hypokalemia.       CKD  4  -Renal diet.  - Avoid Nephrotoxins.  - follow up BMP.    CKD Mineral and Bone Disease:  He has Hypophosphatemia now.  Off binders. Likely Renal wasting or poor GI absorption.  - Supplement PO4.  - Vit D levels.  - His corrected Ca was 12.8 so he may have Primary Hyperparathyroidism, and this would explain his low PO4.    Anemia of CKD:  - Transfuse as needed for Hg < 7.    Hypokalemia:  Likely Distal RTA. Mg is wnl.  - Monitor K and Mg.  - Supplement as needed.

## 2023-06-11 NOTE — PROGRESS NOTE ADULT - NSPROGADDITIONALINFOA_GEN_ALL_CORE
Patient's sister Daija Powers updated on current clinical condition and plan of care. She is planning to visit tomorrow 6/12 with patient's brother as well. She had no further questions at the end of our discussion.

## 2023-06-11 NOTE — CHART NOTE - NSCHARTNOTEFT_GEN_A_CORE
Informed by radiology about the location of ceneral line that it is unclear if arterial or venous , VBG and tracing the pressure on the monitor confirmed that  it is venous .

## 2023-06-11 NOTE — PROGRESS NOTE ADULT - ASSESSMENT
63 year old Male, from [place], AOx3, ambulates with walker , chronic FC, h/o obesity, HTN, HLD, PVD, Seizures, CKD4 (base SCr 3.8), anemia, BPH, Lymphedema, hypothyroidism, HFpEF, possible COPD, decubitus ulcer (sacrum/gluteal), polyneuropathy, polyarthritis, bipolar disorder, presented to ED for abdominal pain, nausea and vomiting brown emesis. Admitted to ICU for Hypotension likely from Sepsis secondary to complicated UTI and esophagitis. Readmitted to ICU status post RRT for hypotension and concern for evolving septic shock.    Plan  ====Neuro====  #Baseline mentation: AAOx2   - lethargic, likely due to metabolic encephalopathy in setting of shock state    #Seizure  - depakote d/c on 6/9 in the setting of thrombocytopenia  - seizure precautions    #Bipolar disorder.   - cont. w/ Wellbutrin, Trazadone  - Psych following  - Depakote discontinued in the setting of thrombocytopenia    #Failure to thrive  - Refusing to eat and participate in medical tx.   - Psych and Palliative involved    ====CVS====  #Sepsis 2/2 pseudomonas bacteremia and UTI; concern for ?PNA  - was already on cefepime (last day 6/10 to complete a total of 14 days)  - blood cultures from 6/1 were cleared  - s/p RRT for hypotension, given 2L IVF  - on levophed for BP support  - continue cefepime for now to complete course  - f/u sputum culture 6/10  - f/u Bcx 6/10 and UA   - ID following, f/u ID recs      ====Pulm====  #AHRF  - likely 2/2 acute anemia and/or hypotension  - cont with NC   - monitor O2 sat and titrate off as indicated    ====GI====  #Diet: NPO  #Bowel regimen: none    #Ulcerative esophagitis  - cont. w/ PPI and Carafate    ====Renal====  #SHEYLA on CKD  - Cr uptrending  - monitor BMP and UOP  - replete elctrolytes prn, replete K if less than 3  - plan for HD, if no improvement, but no need for urgent HD  - nephro following    #BPH  - cont with finasteride and tamsulosin  - on daley    ====ID====  #Sepsis  - see "sepsis" above    ====Heme/Onc====  #thrombocytopenia in the setting of septic shock  -HIT panel negative  -fibrinogen low, PTT elevated, but d-dimer low; inconsistent with DIC  -depakote held  -follow CBC  -keep > 50  - heme/onc following : QMA    #anemia  -h/o suspected GIB, s/p EGD; negative for GIB  -s/p 2 units PRBCs with appropriate response  6.8> 9.7  - no evidence of hemolysis, hapto and bili WNL  -follow CBC  -transfuse < 7        ====Endo====  #bG goal: 140-180  - ISS for poor oral intake    #Hypothyroidism   - continue synthroid 137mcg daily    ====Skin/lines====  #peripheral: 2 peripherals in each arm  #CVC: none  #daley: yes    ====Ppx====  #DVT: hold given acute anemia  #GI: PPI    (4) rarely moist

## 2023-06-11 NOTE — PROGRESS NOTE ADULT - SUBJECTIVE AND OBJECTIVE BOX
INTERVAL HPI/OVERNIGHT EVENTS: no acute overnight events, pt was seen and examined at bedside     PRESSORS: [x ] YES [ ] NO  WHICH: Levophed    Antimicrobial:  cefepime   IVPB      cefepime   IVPB 1000 milliGRAM(s) IV Intermittent every 12 hours  vancomycin  IVPB 1250 milliGRAM(s) IV Intermittent every 48 hours    Cardiovascular:  norepinephrine Infusion 0.05 MICROgram(s)/kG/Min IV Continuous <Continuous>    Pulmonary:  albuterol    90 MICROgram(s) HFA Inhaler 2 Puff(s) Inhalation every 6 hours    Hematalogic:    Other:  albumin human 25% IVPB 50 milliLiter(s) IV Intermittent every 8 hours  buPROPion XL (24-Hour) . 150 milliGRAM(s) Oral daily  chlorhexidine 2% Cloths 1 Application(s) Topical <User Schedule>  divalproex  milliGRAM(s) Oral two times a day  dorzolamide 2% Ophthalmic Solution 1 Drop(s) Right EYE <User Schedule>  finasteride 5 milliGRAM(s) Oral daily  haloperidol    Injectable 1 milliGRAM(s) IntraMuscular every 6 hours PRN  levothyroxine 137 MICROGram(s) Oral daily  ondansetron Injectable 4 milliGRAM(s) IV Push every 8 hours PRN  pantoprazole    Tablet 40 milliGRAM(s) Oral two times a day  pantoprazole  Injectable 40 milliGRAM(s) IV Push every 24 hours  sodium bicarbonate 1300 milliGRAM(s) Oral three times a day  sodium bicarbonate  Infusion 0.134 mEq/kG/Hr IV Continuous <Continuous>  sodium chloride 0.9% lock flush 10 milliLiter(s) IV Push every 1 hour PRN  sucralfate suspension 1 Gram(s) Oral four times a day  tamsulosin 0.4 milliGRAM(s) Oral at bedtime  traZODone 50 milliGRAM(s) Oral at bedtime  vitamin A &amp; D Ointment 1 Application(s) Topical at bedtime    albumin human 25% IVPB 50 milliLiter(s) IV Intermittent every 8 hours  albuterol    90 MICROgram(s) HFA Inhaler 2 Puff(s) Inhalation every 6 hours  buPROPion XL (24-Hour) . 150 milliGRAM(s) Oral daily  cefepime   IVPB      cefepime   IVPB 1000 milliGRAM(s) IV Intermittent every 12 hours  chlorhexidine 2% Cloths 1 Application(s) Topical <User Schedule>  divalproex  milliGRAM(s) Oral two times a day  dorzolamide 2% Ophthalmic Solution 1 Drop(s) Right EYE <User Schedule>  finasteride 5 milliGRAM(s) Oral daily  haloperidol    Injectable 1 milliGRAM(s) IntraMuscular every 6 hours PRN  levothyroxine 137 MICROGram(s) Oral daily  norepinephrine Infusion 0.05 MICROgram(s)/kG/Min IV Continuous <Continuous>  ondansetron Injectable 4 milliGRAM(s) IV Push every 8 hours PRN  pantoprazole    Tablet 40 milliGRAM(s) Oral two times a day  pantoprazole  Injectable 40 milliGRAM(s) IV Push every 24 hours  sodium bicarbonate 1300 milliGRAM(s) Oral three times a day  sodium bicarbonate  Infusion 0.134 mEq/kG/Hr IV Continuous <Continuous>  sodium chloride 0.9% lock flush 10 milliLiter(s) IV Push every 1 hour PRN  sucralfate suspension 1 Gram(s) Oral four times a day  tamsulosin 0.4 milliGRAM(s) Oral at bedtime  traZODone 50 milliGRAM(s) Oral at bedtime  vancomycin  IVPB 1250 milliGRAM(s) IV Intermittent every 48 hours  vitamin A &amp; D Ointment 1 Application(s) Topical at bedtime    Drug Dosing Weight  Height (cm): 172.7 (09 Jun 2023 21:00)  Weight (kg): 84.2 (09 Jun 2023 21:00)  BMI (kg/m2): 28.2 (09 Jun 2023 21:00)  BSA (m2): 1.98 (09 Jun 2023 21:00)    CENTRAL LINE: [ ] YES [ ] NO  LOCATION:   DATE INSERTED:  REMOVE: [ ] YES [ ] NO  EXPLAIN:    HATFIELD: [ ] YES [ ] NO    DATE INSERTED:  REMOVE:  [ ] YES [ ] NO  EXPLAIN:    A-LINE:  [ ] YES [ ] NO  LOCATION:   DATE INSERTED:  REMOVE:  [ ] YES [ ] NO  EXPLAIN:    PMH -reviewed admission note, no change since admission  PAST MEDICAL & SURGICAL HISTORY:  Hypothyroid      Hyperlipidemia      Ambulatory dysfunction      Anemia      History of BPH      CKD (chronic kidney disease)      Chronic obstructive pulmonary disease (COPD)      Bipolar disorder      HLD (hyperlipidemia)      BPH (benign prostatic hyperplasia)      Chronic diastolic congestive heart failure      Lymphedema      Chronic leg pain      No significant past surgical history          ICU Vital Signs Last 24 Hrs  T(C): 36.1 (10 Carlin 2023 23:46), Max: 37 (10 Carlin 2023 04:00)  T(F): 97 (10 Carlin 2023 23:46), Max: 98.6 (10 Carlin 2023 04:00)  HR: 63 (11 Jun 2023 02:45) (58 - 120)  BP: 139/55 (11 Jun 2023 02:45) (86/53 - 144/73)  BP(mean): 75 (11 Jun 2023 02:45) (47 - 116)  ABP: --  ABP(mean): --  RR: 21 (11 Jun 2023 02:45) (17 - 44)  SpO2: 100% (11 Jun 2023 02:45) (94% - 100%)    O2 Parameters below as of 10 Carlin 2023 07:00  Patient On (Oxygen Delivery Method): room air            ABG - ( 10 Carlin 2023 04:53 )  pH, Arterial: 7.35  pH, Blood: x     /  pCO2: 21    /  pO2: 123   / HCO3: 12    / Base Excess: -11.8 /  SaO2: 100                   06-09 @ 07:01  -  06-10 @ 07:00  --------------------------------------------------------  IN: 2971.8 mL / OUT: 1425 mL / NET: 1546.8 mL                PHYSICAL EXAM:  GENERAL: lNAD  HEAD: Atraumatic, Normocephalic  EYES: EOMI, PERRLA, conjunctiva and sclera clear  ENMT: Dry mucous membranes  NECK: trachea midline, neck Supple  NERVOUS SYSTEM: AAOx2.  Follows simple commands.    CHEST/LUNG: Diminished bilaterally R>L.  No rales, rhonchi, wheezing, or rubs. On 2 L NC  HEART: Regular rate and rhythm; No murmurs, rubs, or gallops  ABDOMEN: Soft, Nontender, Nondistended; Bowel sounds present  : Hatfield in place, draining to gravity   EXTREMITIES:  1+ Peripheral Pulses, No clubbing, cyanosis, +1 pitting edema lower exts.   SKIN: Stage 2 with DTI on Sacrum and Discoloration of lower extremities.     LABS:  CBC Full  -  ( 10 Carlin 2023 03:40 )  WBC Count : 15.34 K/uL  RBC Count : 3.22 M/uL  Hemoglobin : 9.7 g/dL  Hematocrit : 29.5 %  Platelet Count - Automated : 132 K/uL  Mean Cell Volume : 91.6 fl  Mean Cell Hemoglobin : 30.1 pg  Mean Cell Hemoglobin Concentration : 32.9 gm/dL  Auto Neutrophil # : 12.42 K/uL  Auto Lymphocyte # : 1.05 K/uL  Auto Monocyte # : 1.24 K/uL  Auto Eosinophil # : 0.07 K/uL  Auto Basophil # : 0.05 K/uL  Auto Neutrophil % : 81.0 %  Auto Lymphocyte % : 6.8 %  Auto Monocyte % : 8.1 %  Auto Eosinophil % : 0.5 %  Auto Basophil % : 0.3 %    06-10    141  |  118<H>  |  65<H>  ----------------------------<  105<H>  3.9   |  13<L>  |  4.79<H>    Ca    8.9      10 Carlin 2023 03:40  Phos  3.1     06-10  Mg     1.5     06-10    TPro  5.7<L>  /  Alb  2.0<L>  /  TBili  0.3  /  DBili  x   /  AST  8<L>  /  ALT  6<L>  /  AlkPhos  90  06-10    PT/INR - ( 09 Jun 2023 21:56 )   PT: 19.4 sec;   INR: 1.62 ratio         PTT - ( 09 Jun 2023 21:56 )  PTT:55.3 sec        RADIOLOGY & ADDITIONAL STUDIES REVIEWED:  ***    [ ]GOALS OF CARE DISCUSSION WITH PATIENT/FAMILY/PROXY:    CRITICAL CARE TIME SPENT: 35 minutes INTERVAL HPI/OVERNIGHT EVENTS: no acute overnight events, pt was seen and examined at bedside. Pt has required 2units PRBCs/24hs, currently no overt signs of active bleeding.    PRESSORS: [x ] YES [ ] NO  WHICH: Levophed     Antimicrobial:  cefepime   IVPB      cefepime   IVPB 1000 milliGRAM(s) IV Intermittent every 12 hours  vancomycin  IVPB 1250 milliGRAM(s) IV Intermittent every 48 hours    Cardiovascular:  norepinephrine Infusion 0.05 MICROgram(s)/kG/Min IV Continuous <Continuous> currently infusing @0.12 mcg/kg/min    Pulmonary:  albuterol    90 MICROgram(s) HFA Inhaler 2 Puff(s) Inhalation every 6 hours    Hematalogic:    Other:  albumin human 25% IVPB 50 milliLiter(s) IV Intermittent every 8 hours  buPROPion XL (24-Hour) . 150 milliGRAM(s) Oral daily  chlorhexidine 2% Cloths 1 Application(s) Topical <User Schedule>  divalproex  milliGRAM(s) Oral two times a day  dorzolamide 2% Ophthalmic Solution 1 Drop(s) Right EYE <User Schedule>  finasteride 5 milliGRAM(s) Oral daily  haloperidol    Injectable 1 milliGRAM(s) IntraMuscular every 6 hours PRN  levothyroxine 137 MICROGram(s) Oral daily  ondansetron Injectable 4 milliGRAM(s) IV Push every 8 hours PRN  pantoprazole    Tablet 40 milliGRAM(s) Oral two times a day  pantoprazole  Injectable 40 milliGRAM(s) IV Push every 24 hours  sodium bicarbonate 1300 milliGRAM(s) Oral three times a day  sodium bicarbonate  Infusion 0.134 mEq/kG/Hr IV Continuous <Continuous>  sodium chloride 0.9% lock flush 10 milliLiter(s) IV Push every 1 hour PRN  sucralfate suspension 1 Gram(s) Oral four times a day  tamsulosin 0.4 milliGRAM(s) Oral at bedtime  traZODone 50 milliGRAM(s) Oral at bedtime  vitamin A &amp; D Ointment 1 Application(s) Topical at bedtime    albumin human 25% IVPB 50 milliLiter(s) IV Intermittent every 8 hours  albuterol    90 MICROgram(s) HFA Inhaler 2 Puff(s) Inhalation every 6 hours  buPROPion XL (24-Hour) . 150 milliGRAM(s) Oral daily  cefepime   IVPB      cefepime   IVPB 1000 milliGRAM(s) IV Intermittent every 12 hours  chlorhexidine 2% Cloths 1 Application(s) Topical <User Schedule>  divalproex  milliGRAM(s) Oral two times a day  dorzolamide 2% Ophthalmic Solution 1 Drop(s) Right EYE <User Schedule>  finasteride 5 milliGRAM(s) Oral daily  haloperidol    Injectable 1 milliGRAM(s) IntraMuscular every 6 hours PRN  levothyroxine 137 MICROGram(s) Oral daily  norepinephrine Infusion 0.05 MICROgram(s)/kG/Min IV Continuous <Continuous>  ondansetron Injectable 4 milliGRAM(s) IV Push every 8 hours PRN  pantoprazole    Tablet 40 milliGRAM(s) Oral two times a day  pantoprazole  Injectable 40 milliGRAM(s) IV Push every 24 hours  sodium bicarbonate 1300 milliGRAM(s) Oral three times a day  sodium bicarbonate  Infusion 0.134 mEq/kG/Hr IV Continuous <Continuous>  sodium chloride 0.9% lock flush 10 milliLiter(s) IV Push every 1 hour PRN  sucralfate suspension 1 Gram(s) Oral four times a day  tamsulosin 0.4 milliGRAM(s) Oral at bedtime  traZODone 50 milliGRAM(s) Oral at bedtime  vancomycin  IVPB 1250 milliGRAM(s) IV Intermittent every 48 hours  vitamin A &amp; D Ointment 1 Application(s) Topical at bedtime    Drug Dosing Weight  Height (cm): 172.7 (09 Jun 2023 21:00)  Weight (kg): 84.2 (09 Jun 2023 21:00)  BMI (kg/m2): 28.2 (09 Jun 2023 21:00)  BSA (m2): 1.98 (09 Jun 2023 21:00)    CENTRAL LINE: [ X] YES [ ] NO  LOCATION:   DATE INSERTED: 6/9/23  REMOVE: [ ] YES [ ] NO  EXPLAIN:    HATFIELD: [X ] YES [ ] NO    DATE INSERTED: 5/29/23 Coude catheter placed by Urology  REMOVE:  [ ] YES [ ] NO  EXPLAIN:    A-LINE:  [ ] YES [ X] NO  LOCATION:   DATE INSERTED:  REMOVE:  [ ] YES [ ] NO  EXPLAIN:    PMH -reviewed admission note, no change since admission  PAST MEDICAL & SURGICAL HISTORY:  Hypothyroid      Hyperlipidemia      Ambulatory dysfunction      Anemia      History of BPH      CKD (chronic kidney disease)      Chronic obstructive pulmonary disease (COPD)      Bipolar disorder      HLD (hyperlipidemia)      BPH (benign prostatic hyperplasia)      Chronic diastolic congestive heart failure      Lymphedema      Chronic leg pain      No significant past surgical history          ICU Vital Signs Last 24 Hrs  T(C): 36.1 (10 Carlin 2023 23:46), Max: 37 (10 Carlin 2023 04:00)  T(F): 97 (10 Carlin 2023 23:46), Max: 98.6 (10 Carlin 2023 04:00)  HR: 63 (11 Jun 2023 02:45) (58 - 120)  BP: 139/55 (11 Jun 2023 02:45) (86/53 - 144/73)  BP(mean): 75 (11 Jun 2023 02:45) (47 - 116)  ABP: --  ABP(mean): --  RR: 21 (11 Jun 2023 02:45) (17 - 44)  SpO2: 100% (11 Jun 2023 02:45) (94% - 100%)    O2 Parameters below as of 10 Carlin 2023 07:00  Patient On (Oxygen Delivery Method): room air            ABG - ( 10 Carlin 2023 04:53 )  pH, Arterial: 7.35  pH, Blood: x     /  pCO2: 21    /  pO2: 123   / HCO3: 12    / Base Excess: -11.8 /  SaO2: 100             06-09 @ 07:01  -  06-10 @ 07:00  --------------------------------------------------------  IN: 2971.8 mL / OUT: 1425 mL / NET: 1546.8 mL                PHYSICAL EXAM:  GENERAL: NAD  HEAD: Atraumatic, Normocephalic  EYES: EOMI, PERRL, conjunctiva and sclera clear  ENMT: Dry mucous membranes  NECK: trachea midline, neck Supple  NERVOUS SYSTEM: AAOx2. Able to state he is in hospital, year but unaware of month, day of week.  Follows simple commands.    CHEST/LUNG: Non-labored, no accessory muscle use; coarse rhonchi bilaterally, clears somewhat with cough; occasional wet cough. Diminished bilaterally R>L.  No wheezing or rubs. On 2 L NC  HEART: Regular rate and rhythm; No murmurs, rubs, or gallops  ABDOMEN: Soft, Nontender, Nondistended; Bowel sounds present  : Hatfield in place, draining to gravity   EXTREMITIES:  1+ Peripheral Pulses, No clubbing, cyanosis, +1 pitting edema lower exts.   SKIN: Stage 2 with DTI on Sacrum and Discoloration of lower extremities.     LABS:  CBC Full  -  ( 10 Carlin 2023 03:40 )  WBC Count : 15.34 K/uL  RBC Count : 3.22 M/uL  Hemoglobin : 9.7 g/dL  Hematocrit : 29.5 %  Platelet Count - Automated : 132 K/uL  Mean Cell Volume : 91.6 fl  Mean Cell Hemoglobin : 30.1 pg  Mean Cell Hemoglobin Concentration : 32.9 gm/dL  Auto Neutrophil # : 12.42 K/uL  Auto Lymphocyte # : 1.05 K/uL  Auto Monocyte # : 1.24 K/uL  Auto Eosinophil # : 0.07 K/uL  Auto Basophil # : 0.05 K/uL  Auto Neutrophil % : 81.0 %  Auto Lymphocyte % : 6.8 %  Auto Monocyte % : 8.1 %  Auto Eosinophil % : 0.5 %  Auto Basophil % : 0.3 %    06-10    141  |  118<H>  |  65<H>  ----------------------------<  105<H>  3.9   |  13<L>  |  4.79<H>    Ca    8.9      10 Carlin 2023 03:40  Phos  3.1     06-10  Mg     1.5     06-10    TPro  5.7<L>  /  Alb  2.0<L>  /  TBili  0.3  /  DBili  x   /  AST  8<L>  /  ALT  6<L>  /  AlkPhos  90  06-10    PT/INR - ( 09 Jun 2023 21:56 )   PT: 19.4 sec;   INR: 1.62 ratio         PTT - ( 09 Jun 2023 21:56 )  PTT:55.3 sec        RADIOLOGY & ADDITIONAL STUDIES REVIEWED:       [ ]GOALS OF CARE DISCUSSION WITH PATIENT/FAMILY/PROXY: see note 5/30/23    CRITICAL CARE TIME SPENT: 35 minutes

## 2023-06-11 NOTE — PROGRESS NOTE ADULT - SUBJECTIVE AND OBJECTIVE BOX
GAYLA PEARCE  63y  Patient is a 63y old  Male who presents with a chief complaint of sepsis (11 Jun 2023 03:32)    HPI:  Seen and examined. No new complaints at this time.  Transferred to ICU for Hypotension, now on vasopressors.    HEALTH ISSUES - PROBLEM Dx:  GIB (gastrointestinal bleeding)    Sepsis    Anemia    Hypothyroid    CKD (chronic kidney disease)    BPH (benign prostatic hyperplasia)    Bipolar disorder    Advance care planning    Seizure    Esophagitis    Hypokalemia    Ambulatory dysfunction    Prophylactic measure    Electrolyte depletion          MEDICATIONS  (STANDING):  albuterol    90 MICROgram(s) HFA Inhaler 2 Puff(s) Inhalation every 6 hours  buPROPion XL (24-Hour) . 150 milliGRAM(s) Oral daily  cefepime   IVPB 1000 milliGRAM(s) IV Intermittent every 12 hours  chlorhexidine 2% Cloths 1 Application(s) Topical <User Schedule>  divalproex  milliGRAM(s) Oral two times a day  dorzolamide 2% Ophthalmic Solution 1 Drop(s) Right EYE <User Schedule>  finasteride 5 milliGRAM(s) Oral daily  norepinephrine Infusion 0.05 MICROgram(s)/kG/Min (3.95 mL/Hr) IV Continuous <Continuous>  pantoprazole  Injectable 40 milliGRAM(s) IV Push every 24 hours  sodium bicarbonate 1300 milliGRAM(s) Oral three times a day  sucralfate suspension 1 Gram(s) Oral four times a day  tamsulosin 0.4 milliGRAM(s) Oral at bedtime  traZODone 50 milliGRAM(s) Oral at bedtime  vitamin A &amp; D Ointment 1 Application(s) Topical at bedtime    MEDICATIONS  (PRN):  haloperidol    Injectable 1 milliGRAM(s) IntraMuscular every 6 hours PRN Agitation  ondansetron Injectable 4 milliGRAM(s) IV Push every 8 hours PRN Nausea and/or Vomiting  sodium chloride 0.9% lock flush 10 milliLiter(s) IV Push every 1 hour PRN Pre/post blood products, medications, blood draw, and to maintain line patency    Vital Signs Last 24 Hrs  T(C): 36.2 (11 Jun 2023 04:00), Max: 36.7 (10 Carlin 2023 17:30)  T(F): 97.2 (11 Jun 2023 04:00), Max: 98 (10 Carlin 2023 17:30)  HR: 112 (11 Jun 2023 15:15) (57 - 122)  BP: 111/68 (11 Jun 2023 15:15) (100/65 - 149/96)  BP(mean): 79 (11 Jun 2023 15:15) (62 - 109)  RR: 21 (11 Jun 2023 15:15) (16 - 44)  SpO2: 100% (11 Jun 2023 15:15) (91% - 100%)    Parameters below as of 11 Jun 2023 06:45  Patient On (Oxygen Delivery Method): nasal cannula  O2 Flow (L/min): 2    Daily     Daily     PHYSICAL EXAM:  Constitutional: He  appears comfortable and not distressed. Not diaphoretic.    Neck:  The thyroid is normal. Trachea is midline.     Breasts: Normal examination.    Respiratory: The lungs are clear to auscultation. No dullness and expansion is normal.    Cardiovascular: S1 and S2 are normal. No murmurs rubs or gallops are present.    Gastrointestinal: The abdomen is soft. No tenderness is present. No masses are present. Bowel sounds are normal.    Genitourinary: The bladder is not distended. No CVA tenderness is present.    Extremities: No edema is noted. No deformities are present.    Neurological: Cognition is normal. Tone, power and sensation are normal.     Skin: No lesions are seen  or palpated.    Lymph Nodes: No lymphadenopathy is present.    I&O's Summary    10 Carlin 2023 07:01  -  11 Jun 2023 07:00  --------------------------------------------------------  IN: 2541.4 mL / OUT: 1940 mL / NET: 601.4 mL    11 Jun 2023 07:01  -  11 Jun 2023 15:31  --------------------------------------------------------  IN: 63.1 mL / OUT: 650 mL / NET: -586.9 mL                                9.3    12.59 )-----------( 104      ( 11 Jun 2023 03:40 )             27.9     06-11    146<H>  |  116<H>  |  56<H>  ----------------------------<  120<H>  3.0<L>   |  19<L>  |  5.23<H>    Ca    8.9      11 Jun 2023 03:40  Phos  2.5     06-11  Mg     1.7     06-11    TPro  5.2<L>  /  Alb  2.1<L>  /  TBili  0.4  /  DBili  x   /  AST  11  /  ALT  7<L>  /  AlkPhos  85  06-11

## 2023-06-11 NOTE — PROGRESS NOTE ADULT - ASSESSMENT
63 year old Male, from [place], AOx3, ambulates with walker , chronic FC, h/o obesity, HTN, HLD, PVD, Seizures, CKD4 (base SCr 3.8), anemia, BPH, Lymphedema, hypothyroidism, HFpEF, possible COPD, decubitus ulcer (sacrum/gluteal), polyneuropathy, polyarthritis, bipolar disorder, presented to ED for abdominal pain, nausea and vomiting brown emesis. Admitted to ICU for Hypotension likely from Sepsis secondary to complicated UTI and esophagitis. Readmitted to ICU status post RRT for hypotension and concern for evolving septic shock.    Plan  ====Neuro====  #Baseline mentation: AAOx2   - lethargic, likely due to metabolic encephalopathy in setting of shock state    #Seizure  - depakote d/c on 6/9 in the setting of thrombocytopenia  - seizure precautions    #Bipolar disorder.   - cont. w/ Wellbutrin, Trazadone  - Psych following  - Depakote discontinued in the setting of thrombocytopenia    #Failure to thrive  - Refusing to eat and participate in medical tx.   - Psych and Palliative involved    ====CVS====  #Sepsis 2/2 pseudomonas bacteremia and UTI; concern for ?PNA  - was already on cefepime (last day 6/10 to complete a total of 14 days)  - blood cultures from 6/1 were cleared  - s/p RRT for hypotension, given 2L IVF  - on levophed for BP support  - continue cefepime for now to complete course  - f/u sputum culture 6/10  - f/u Bcx 6/10 and UA   - ID following, f/u ID recs      ====Pulm====  #AHRF  - likely 2/2 acute anemia and/or hypotension  - cont with NC   - monitor O2 sat and titrate off as indicated    ====GI====  #Diet: NPO  #Bowel regimen: none    #Ulcerative esophagitis  - cont. w/ PPI and Carafate    ====Renal====  #SHEYLA on CKD  - Cr uptrending  - monitor BMP and UOP  - replete elctrolytes prn, replete K if less than 3  - plan for HD, if no improvement, but no need for urgent HD  - nephro following    #BPH  - cont with finasteride and tamsulosin  - on daley    ====ID====  #Sepsis  - see "sepsis" above    ====Heme/Onc====  #thrombocytopenia in the setting of septic shock  -HIT panel negative  -fibrinogen low, PTT elevated, but d-dimer low; inconsistent with DIC  -depakote held  -follow CBC  -keep > 50  - heme/onc following : QMA    #anemia  -h/o suspected GIB, s/p EGD; negative for GIB  -s/p 2 units PRBCs with appropriate response  6.8> 9.7  - no evidence of hemolysis, hapto and bili WNL  -follow CBC  -transfuse < 7        ====Endo====  #bG goal: 140-180  - ISS for poor oral intake    #Hypothyroidism   - continue synthroid 137mcg daily    ====Skin/lines====  #peripheral: 2 peripherals in each arm  #CVC: none  #daley: yes    ====Ppx====  #DVT: hold given acute anemia  #GI: PPI     63 year old Male, from [place], AOx3, ambulates with walker , chronic FC, h/o obesity, HTN, HLD, PVD, Seizures, CKD4 (base SCr 3.8), anemia, BPH, Lymphedema, hypothyroidism, HFpEF, possible COPD, decubitus ulcer (sacrum/gluteal), polyneuropathy, polyarthritis, bipolar disorder, presented to ED for abdominal pain, nausea and vomiting brown emesis. Admitted to ICU for Hypotension likely from Sepsis secondary to complicated UTI and esophagitis. Readmitted to ICU status post RRT for hypotension and concern for evolving septic shock.    Plan  ====Neuro====  #Baseline mentation: AAOx2   - lethargic, likely due to metabolic encephalopathy in setting of shock state and organ dysfunction    #Seizure  - Continue Depakote; initially planned to d/c 6/9 given thrombocytopenia but Plts now stable  - seizure precautions    #Bipolar disorder.   - cont. w/ Wellbutrin, Trazadone  - Psych following  - Continue Depakote    #Failure to thrive  - Refusing to eat and participate in medical tx.   - Psych and Palliative involved    ====CVS====  #Sepsis 2/2 pseudomonas bacteremia and UTI; concern for ?PNA  - was already on cefepime (last day 6/11 to complete a total of 14 days)   - blood cultures from 6/1 were cleared  - s/p RRT for hypotension, given 2L IVF  - on levophed for BP support  - continue cefepime for now to complete course - completes 14d course through 6/11, d/c on 6/12  - f/u sputum culture 6/10  - f/u Bcx 6/10 and UA   - ID following, f/u ID recs      ====Pulm====  #AHRF  - likely 2/2 acute anemia and/or hypotension  - cont with NC   - monitor O2 sat and titrate off as indicated  - repeat CXR today -> repeat CXR unchanged from prior, malpositioned [facility] CVC again noted, probable small bibasilar effusions and atelectasis    ====GI====  #Diet: NPO  #Bowel regimen: none    #Ulcerative esophagitis  - cont. w/ PPI and Carafate  - s/p EGD 5/30/23 -> contact GI in AM 6/12 for possible repeat EGD given patient required 2units PRBCs on 6/10  - Will check q12h CBC 6/11      ====Renal====  #SHEYLA on CKD  #Hypokalemia  - Cr uptrending  - monitor BMP and UOP  - replete elctrolytes prn, replete K if less than 3  - plan for HD, if no improvement, but no need for urgent HD  - nephro following  - bicarb gtt d/cd 6/11, trend venous pH    #BPH  - cont with finasteride and tamsulosin  - on daley    ====ID====  #Sepsis  - see "sepsis" above    ====Heme/Onc====  #thrombocytopenia in the setting of septic shock  -HIT panel negative  -fibrinogen low, PTT elevated, but d-dimer low; inconsistent with DIC  -Plts now stable  -follow CBC  -keep PLT > 50  - heme/onc following : QMA    #anemia  -h/o suspected GIB, s/p EGD; negative for GIB (5/30)  -s/p 2 units PRBCs with appropriate response  6.8> 9.7  - no evidence of hemolysis, hapto and bili WNL  -follow CBC  q12h -> f/u 3pm  -transfuse < 7        ====Endo====  #bG goal: 140-180  - ISS for poor oral intake    #Hypothyroidism   - continue synthroid 137mcg daily    ====Skin/lines====  #peripheral: 2 peripherals in each arm  #CVC: [facility] (6/9)  #daley: yes    ====Ppx====  #DVT: hold given acute anemia  #GI: PPI

## 2023-06-11 NOTE — PROGRESS NOTE ADULT - ASSESSMENT
SHEYLA: Cr is rising again. S/P shock and bleeding yesterday.  On Vasopressors and Urine Output is > 50 mL/hour.  - Follow up BMP.  - MAP > 65.  - Maintain daley for now.  - HD if no improvement.  - No need for urgent HD at this time.      Metabolic Acidosis:  Non AG, likely Distal RTA.  Associated with relatively low K.  - Monitor BMP.    CKD  4  -Renal diet.  - Avoid Nephrotoxins.  - follow up BMP.    CKD Mineral and Bone Disease:  He has Hypophosphatemia now.  Off binders. Likely Renal wasting or poor GI absorption.  - Supplement PO4.  - Vit D levels.  - His corrected Ca was 12.8 so he may have Primary Hyperparathyroidism, and this would explain his low PO4.    Anemia of CKD:  - Transfuse as needed for Hg < 7.    Hypokalemia:  Likely Distal RTA. Mg is wnl.  - Monitor K and Mg.  - Supplement as needed.

## 2023-06-11 NOTE — PROGRESS NOTE ADULT - ATTENDING COMMENTS
IMP: This is a 63 year old man , from [place], AOx3, ambulates with walker , chronic FC, h/o obesity, HTN, HLD, PVD, Seizures, CKD4 (base SCr 3.8), anemia, BPH, Lymphedema, hypothyroidism, HFpEF, possible COPD, decubitus ulcer (sacrum/gluteal), polyneuropathy, polyarthritis, bipolar disorder. Patient presented to ED for abdominal pain, nausea and vomiting brown emesis. Patient admitted to ICU for Hypotension likely from Sepsis secondary to complicated UTI and esophagitis. CT head was negative for acute changes. CT abd showed circumferential wall thickening of distal esophagus. GI consulted recommending endoscopy which took place on 5/30 with findings of ulcerative esophagitis. On PPI.   Blood cultures from 5/28 + for P. aeruginasa. Ucx likely contaminant. On cefepime.  Patient with pressure injury. Wound care consult. Nephrology following for SHEYLA on CKD.   06/01: Repeat blood cx pending. Advance diet as tolerated. PT reccs TG (5/30). Plan to return to [place] once repeat Bcx come back. C/w Cefepime for complicated UTI and P. Aeruginosa bacteriemia. Afebrile. BP soft this AM.    06/02: Repeat blood cx pending. On Cefepime. PT reccs TG. Dispo ([place]). Hemodynamically stable. Plan for OOB to chair.   06/03: Repeat Bcx from 6/1 NGTD. Afebrile. On Cefepime. ID following. Hemodynamically stable. Plan to advance diet to full liquids.   6/4: Patient with tachycardia secondary to frustration from not being able to be discharge. Emotional support given, encouraged to verbalize concerns, explained in full the plan of care. Patient VS became stable, will hold off on EKG as isolated event.   06/07: Urinary retention with Viera insertion overnight. No acceptance from chosen facilities. Bcx NGTD. Plan to d/c on Cipro and Maxepime for a total of 14 days per ID. SCr increased to 4.80 from 4.48. Hypokalemic this AM. Replacement in progress. Titrating O2.   06/08: Costa in WBC. Afebrile. Soft BP yesterday with SBP in the 90s.  C/o SOB this AM. See assessment above. CXR ordered and BNP. Patient refuses to participate in PT. Will attempt again today. Plan to D/C to [facility] when optimized.   06/09: Lethargic. Thrombocytopenia Discontinued Depakote. Discussed with psych as it could be causing drop on plt. HIT panel negative. Hem/Onc consulted. Soft BP. Orthostatic hypotension. Midodrine started. IVF. Electrolyte derangements Replacement in progress. CXR shows possible infiltrate RLL. on 2L NC. Afebrile. Worsening SHEYLA. Discussed wit hnephro. No plan for HD at this time. Will continue to monitor. Patient has Viera and making urine.   Transferred to  ICU for Hypotension likely from Sepsis secondary to complicated UTI and esophagitis. Noted to have low H/H but no overt sign of bleeding s/p 1 unit PRBC. AMS due to encephalopathy       Sugg:    - O2 supp as needed   - No sedation   - AMS due to encephalopathy   - Hold depakote due to thrombocytopenia   - Hemodynamic monitoring   - Albumin iv   - NPO   - SHEYLA   - S/P blood product transfusion   - Monitor CBC  - If H/H continue to decrease , will need CT chest abd and pelvis   - Continue antibx   - Dose vanco as per serum level   - Viera cath   - D/C Bicarb gtt   - Heme eval noted .. not DIC   - Pat is refusing to participate in care   - Psy f/u. IMP: This is a 63 year old man , from [place], AOx3, ambulates with walker , chronic FC, h/o obesity, HTN, HLD, PVD, Seizures, CKD4 (base SCr 3.8), anemia, BPH, Lymphedema, hypothyroidism, HFpEF, possible COPD, decubitus ulcer (sacrum/gluteal), polyneuropathy, polyarthritis, bipolar disorder. Patient presented to ED for abdominal pain, nausea and vomiting brown emesis. Patient admitted to ICU for Hypotension likely from Sepsis secondary to complicated UTI and esophagitis. CT head was negative for acute changes. CT abd showed circumferential wall thickening of distal esophagus. GI consulted recommending endoscopy which took place on 5/30 with findings of ulcerative esophagitis. On PPI.   Blood cultures from 5/28 + for P. aeruginasa. Ucx likely contaminant. On cefepime.  Patient with pressure injury. Wound care consult. Nephrology following for SHEYLA on CKD.   06/01: Repeat blood cx pending. Advance diet as tolerated. PT reccs TG (5/30). Plan to return to [place] once repeat Bcx come back. C/w Cefepime for complicated UTI and P. Aeruginosa bacteriemia. Afebrile. BP soft this AM.    06/02: Repeat blood cx pending. On Cefepime. PT reccs TG. Dispo ([place]). Hemodynamically stable. Plan for OOB to chair.   06/03: Repeat Bcx from 6/1 NGTD. Afebrile. On Cefepime. ID following. Hemodynamically stable. Plan to advance diet to full liquids.   6/4: Patient with tachycardia secondary to frustration from not being able to be discharge. Emotional support given, encouraged to verbalize concerns, explained in full the plan of care. Patient VS became stable, will hold off on EKG as isolated event.   06/07: Urinary retention with Viera insertion overnight. No acceptance from chosen facilities. Bcx NGTD. Plan to d/c on Cipro and Maxepime for a total of 14 days per ID. SCr increased to 4.80 from 4.48. Hypokalemic this AM. Replacement in progress. Titrating O2.   06/08: Costa in WBC. Afebrile. Soft BP yesterday with SBP in the 90s.  C/o SOB this AM. See assessment above. CXR ordered and BNP. Patient refuses to participate in PT. Will attempt again today. Plan to D/C to [facility] when optimized.   06/09: Lethargic. Thrombocytopenia Discontinued Depakote. Discussed with psych as it could be causing drop on plt. HIT panel negative. Hem/Onc consulted. Soft BP. Orthostatic hypotension. Midodrine started. IVF. Electrolyte derangements Replacement in progress. CXR shows possible infiltrate RLL. on 2L NC. Afebrile. Worsening SHEYLA. Discussed wit hnephro. No plan for HD at this time. Will continue to monitor. Patient has Viera and making urine.   Transferred to  ICU for Hypotension likely from Sepsis secondary to complicated UTI and esophagitis. Noted to have low H/H but no overt sign of bleeding s/p 1 unit PRBC. AMS due to encephalopathy       Sugg:    - O2 supp as needed   - No sedation   - AMS due to encephalopathy   - Hold depakote due to thrombocytopenia   - Hemodynamic monitoring   - Albumin iv   - NPO   - SHEYLA   - S/P blood product transfusion   - Monitor CBC  - If H/H continue to decrease , will need CT chest abd and pelvis   - Continue antibx   - Dose vanco as per serum level   - Viera cath   - D/C Bicarb gtt   - Heme eval noted .. not DIC   - Pat is refusing to participate in care   - L IJ TLC is 15 cm cath , wave form and blood gas confirm venous   - Will monitor line position and change location   - Psy f/u.

## 2023-06-11 NOTE — PROGRESS NOTE ADULT - SUBJECTIVE AND OBJECTIVE BOX
Patient is a 63y old  Male who presents with a chief complaint of sepsis (11 Jun 2023 15:27)    PATIENT IS SEEN AND EXAMINED IN MEDICAL FLOOR.  SOWMYA [    ]    ALYSIA [   ]      GT [   ]    ALLERGIES:  No Known Allergies      Daily     Daily     VITALS:    Vital Signs Last 24 Hrs  T(C): 36.6 (11 Jun 2023 20:00), Max: 36.6 (11 Jun 2023 20:00)  T(F): 97.8 (11 Jun 2023 20:00), Max: 97.8 (11 Jun 2023 20:00)  HR: 72 (11 Jun 2023 22:45) (57 - 126)  BP: 104/53 (11 Jun 2023 22:45) (88/47 - 149/96)  BP(mean): 66 (11 Jun 2023 22:45) (56 - 109)  RR: 22 (11 Jun 2023 22:45) (16 - 34)  SpO2: 98% (11 Jun 2023 22:45) (91% - 100%)    Parameters below as of 11 Jun 2023 20:00  Patient On (Oxygen Delivery Method): room air        LABS:    CBC Full  -  ( 11 Jun 2023 16:10 )  WBC Count : 9.87 K/uL  RBC Count : 3.00 M/uL  Hemoglobin : 8.8 g/dL  Hematocrit : 26.0 %  Platelet Count - Automated : 89 K/uL  Mean Cell Volume : 86.7 fl  Mean Cell Hemoglobin : 29.3 pg  Mean Cell Hemoglobin Concentration : 33.8 gm/dL  Auto Neutrophil # : x  Auto Lymphocyte # : x  Auto Monocyte # : x  Auto Eosinophil # : x  Auto Basophil # : x  Auto Neutrophil % : x  Auto Lymphocyte % : x  Auto Monocyte % : x  Auto Eosinophil % : x  Auto Basophil % : x    PT/INR - ( 11 Jun 2023 16:10 )   PT: 20.6 sec;   INR: 1.72 ratio         PTT - ( 11 Jun 2023 16:10 )  PTT:51.0 sec  06-11    149<H>  |  118<H>  |  57<H>  ----------------------------<  106<H>  3.1<L>   |  21<L>  |  5.31<H>    Ca    8.9      11 Jun 2023 16:10  Phos  2.3     06-11  Mg     1.7     06-11    TPro  5.2<L>  /  Alb  2.1<L>  /  TBili  0.4  /  DBili  x   /  AST  11  /  ALT  7<L>  /  AlkPhos  85  06-11    CAPILLARY BLOOD GLUCOSE            LIVER FUNCTIONS - ( 11 Jun 2023 03:40 )  Alb: 2.1 g/dL / Pro: 5.2 g/dL / ALK PHOS: 85 U/L / ALT: 7 U/L DA / AST: 11 U/L / GGT: x           Creatinine Trend: 5.31<--, 5.23<--, 4.79<--, 4.73<--, 4.70<--, 4.91<--  I&O's Summary    10 Carlin 2023 07:01  -  11 Jun 2023 07:00  --------------------------------------------------------  IN: 2541.4 mL / OUT: 1940 mL / NET: 601.4 mL    11 Jun 2023 07:01  -  11 Jun 2023 23:47  --------------------------------------------------------  IN: 338.9 mL / OUT: 1125 mL / NET: -786.1 mL        ABG - ( 10 Carlin 2023 04:53 )  pH, Arterial: 7.35  pH, Blood: x     /  pCO2: 21    /  pO2: 123   / HCO3: 12    / Base Excess: -11.8 /  SaO2: 100                 .Sputum Sputum  06-10 @ 10:23   Normal Respiratory Ivelisse present  --    Few polymorphonuclear leukocytes per low power field  Few Squamous epithelial cells per low power field  Rare Yeast like cells per oil power field  Few Gram Variable Rods per oil power field      .Blood Blood-Peripheral  06-10 @ 04:42   No growth to date.  --  --      .Blood Blood-Peripheral  06-10 @ 03:40   No growth to date.  --  --      .Blood Blood  06-01 @ 10:52   No Growth Final  --  --      .Blood Blood  06-01 @ 10:46   No Growth Final  --  --      .Blood Blood-Peripheral  05-28 @ 21:09   No Growth Final  --  Blood Culture PCR  Pseudomonas aeruginosa      Clean Catch Clean Catch (Midstream)  05-28 @ 21:02   >=3 organisms. Probable collection contamination.  --  --          MEDICATIONS:    MEDICATIONS  (STANDING):  albuterol    90 MICROgram(s) HFA Inhaler 2 Puff(s) Inhalation every 6 hours  buPROPion XL (24-Hour) . 150 milliGRAM(s) Oral daily  cefepime   IVPB 1000 milliGRAM(s) IV Intermittent every 12 hours  chlorhexidine 2% Cloths 1 Application(s) Topical <User Schedule>  dextrose 5%. 1000 milliLiter(s) (50 mL/Hr) IV Continuous <Continuous>  divalproex  milliGRAM(s) Oral two times a day  dorzolamide 2% Ophthalmic Solution 1 Drop(s) Right EYE <User Schedule>  finasteride 5 milliGRAM(s) Oral daily  norepinephrine Infusion 0.05 MICROgram(s)/kG/Min (3.95 mL/Hr) IV Continuous <Continuous>  pantoprazole  Injectable 40 milliGRAM(s) IV Push every 24 hours  sodium bicarbonate 1300 milliGRAM(s) Oral three times a day  sucralfate suspension 1 Gram(s) Oral four times a day  tamsulosin 0.4 milliGRAM(s) Oral at bedtime  traZODone 50 milliGRAM(s) Oral at bedtime  vancomycin  IVPB 1250 milliGRAM(s) IV Intermittent once  vitamin A &amp; D Ointment 1 Application(s) Topical at bedtime      MEDICATIONS  (PRN):  haloperidol    Injectable 1 milliGRAM(s) IntraMuscular every 6 hours PRN Agitation  ondansetron Injectable 4 milliGRAM(s) IV Push every 8 hours PRN Nausea and/or Vomiting  sodium chloride 0.9% lock flush 10 milliLiter(s) IV Push every 1 hour PRN Pre/post blood products, medications, blood draw, and to maintain line patency      REVIEW OF SYSTEMS:                           ALL ROS DONE [ X   ]    CONSTITUTIONAL:  LETHARGIC [   ], FEVER [   ], UNRESPONSIVE [   ]  CVS:  CP  [   ], SOB, [   ], PALPITATIONS [   ], DIZZYNESS [   ]  RS: COUGH [   ], SPUTUM [   ]  GI: ABDOMINAL PAIN [   ], NAUSEA [   ], VOMITINGS [   ], DIARRHEA [   ], CONSTIPATION [   ]  :  DYSURIA [   ], NOCTURIA [   ], INCREASED FREQUENCY [   ], DRIBLING [   ],  SKELETAL: PAINFUL JOINTS [   ], SWOLLEN JOINTS [   ], NECK ACHE [   ], LOW BACK ACHE [   ],  SKIN : ULCERS [   ], RASH [   ], ITCHING [   ]  CNS: HEAD ACHE [   ], DOUBLE VISION [   ], BLURRED VISION [   ], AMS / CONFUSION [   ], SEIZURES [   ], WEAKNESS [   ],TINGLING / NUMBNESS [   ]    PHYSICAL EXAMINATION:    GENERAL APPEARANCE: NO DISTRESS  HEENT:  NO PALLOR, NO  JVD,  NO   NODES, NECK SUPPLE  CVS: S1 +, S2 +,   RS: AEEB,  OCCASIONAL  RALES +,   NO RONCHI  ABD: SOFT, NT, NO, BS +  EXT: PE+  SKIN: WARM,   SKELETAL:  ROM REDUCED AT CERVICAL & LS SPINE  CNS:  AAO X 2-3      RADIOLOGY :    RADIOLOGY AND READINGS REVIEWED    ASSESSMENT :       PLAN:  HPI:  62 y/o M, A&Ox3, bedbound, chronic dlaey catheter with CKD (baseline creat 3.8), COPD, CHFpEF, BPH, PVD, Lymphedema, decubitus ulcers (Sacrum/gluteal) , Anemia, poly-neuropathy, poly-arthritis, hypothyroidism, seizures and Bipolar disorder was BIB EMS from [place] for abdominal pain, [place]. Mr. Rhoades states that he developed acute onset sharp abdominal pain 4 days ago a/w "brown" emesis (witnessed by EMS). Unable to tolerate food/drink x 4 days. Having normal bowel movements, last this morning 5/28. Denies any fevers. NH paperwork states concern for obstruction.  Patient endorses BLOODY EMESIS x 4 days upon further questioning. (29 May 2023 01:36)      # [6/9] - RRT CALLED FOR HYPOTENSION, FEVER AND ANEMIA - PATIENT GIVEN PRBC TRANSFUSION, VANCOMYCIN ADDED, TRANSFERRED FROM SCU TO ICU    # RESOLVING SEPSIS S/T P.AERUGINOSA BACTEREMIA + COMPLICATED UTI    - ON CEFEPIME, BCX [P. AERUGINOSA] AND UCX [ > 3 ORGANISMS], REPEAT BCX - NGTD  - ID CONSULT  - CRITICAL CARE EVALUATION IN PROGRESS    - PLACED ON VASOPRESSORS  - VANCOMYCIN + CEFEPIME, F/U REPEAT BCX    # RESOLVING GI BLEED  # ESOPHAGITIS  # ANEMIA OF CHRONIC DISEASE   # THROMBOCYTOPENIA  - MONITOR HGB, TRANSFUSION THRESHOLD HGB < 8  - TREND PLT  - S/P EGD - ULCERATIVE ESOPHAGITIS  - PPI BID  - CARAFATE QID  - ADVANCING DIET PER GI RECOMMENDATION   - GI CONSULT  - HEME/ONC CONSULT    - [6/6] - EPISODE OF NAUSEA/VOMITING - MONITORING CLOSELY, PRN ANTIEMETICS    - [6/10] - GIVEN PRBC TRANSFUSION, PLANNED FOR 1 MORE UNIT OF PRBC, PLT AND FFP, DEPAKOTE STOPPED GIVEN THROMBOCYTOPENIA    # ANEMIA DUE TO GI BLEED, ANEMIA OF CKD     # CHRONIC HYPOXIC RESPIRATORY FAILURE S/T UNDERLYING COPD  - ON PRN O2  - CRITICAL CARE EVALUATION IN PROGRESS    # AMS DUE TO ACUTE METABOLIC ENCEPHALOPATHY    # SHEYLA ON CKD STAGE 4  - WORSENING  # CHRONIC DALEY, BPH  - DALEY   - STRICT IS AND OS  - AVOID NEPHROTOXIC AGENTS  - MONITOR CR  - NEPHROLOGY CONSULT    - [6/7] - URINARY RETENTION OVERNIGHT - DALEY PLACED    # AMBULATORY DYSFUNCTION, IMPAIRED GAIT DUE TO GENERALIZED MUSCLE WEAKNESS, CERVICAL & LS SPINAL STENOSIS, POLYARTRHITIS & PERIPHERAL NEUROPATHY. OP  - FALL PRECAUTIONS    # DYSPHAGIA DUE TO METABOLIC ENCEPHALOPATHY - ON MODIFIED DIET PER SWALLOW EVAL    # HX OF SEIZURE DX  - ON VALPROIC ACID    # HYPOTHYROIDISM - ON LEVOTHYROXINE    # PRESSURE ULCERS  - PATIENT IS HIGH RISK FOR PRESSURE ULCERS DESPITE PREVENTIVE MEASURES IN PLACE  - WOUND CARE CONSULT    # GI PPX

## 2023-06-12 NOTE — CONSULT NOTE ADULT - PROBLEM SELECTOR RECOMMENDATION 6
pt noted to have albumin of 2.0  worsened by poor PO intake due to esophagitis  Previous evaluated by nutrition services, recommending for pt to Continue with Nepro BID and appetite stimulant as medically feasible.   Currently NPO in anticipation for potential EGD today
Statement Selected
TTE from this admission (5/29) with relatively benign findings, including grossly normal EF (> 55%), concentric LVH, mild grade I DD, trace MR, and normal RV function  Currently on no cardiac meds, without current symptoms  Continue management as per primary medical team

## 2023-06-12 NOTE — CONSULT NOTE ADULT - PROVIDER SPECIALTY LIST ADULT
Internal Medicine
Infectious Disease
Palliative Care
Critical Care
Pulmonology
Nephrology
Gastroenterology
Heme/Onc
Palliative Care

## 2023-06-12 NOTE — CONSULT NOTE ADULT - ATTENDING COMMENTS
62 y/o with multiple me problem admitted with abd pain, nausea and vomiting. Hospital course complicated by bacteremia. RRT on a floor for hypotension. transferred to ICU for further Mx.     A/P shock hypovolemic and septic   anemia   thrombocytopenia   UTI   bacteremia  ARF on top of CKD     MICU    Hydration   pressors for BP support   transfuse PRBC, monitor CBC   IV PPI   cont for cefepime for now, unless has elevated WBC or fever ( as per ID recommendation )   DIC w/u   monitor renal function, urinary output  Cont other meds
Patient examined and discussed with resident Dr. Forbes.  Overall agree with her assessment and findings as above.    62 y/o M, current resident of [facility], A&Ox3, bedbound at baseline, chronic Viera catheter with PMHx of  CKD (baseline creat 3.8), COPD, CHFpEF, BPH, PVD, Lymphedema, multiple decubitus ulcers (Sacrum/gluteal) , Anemia, polyneuropathy, polyarthritis, hypothyroidism, seizures and Bipolar disorder.  Originally BIBEMS from [facility] to [facility] late evening 5/28 for abdominal pain, nausea, and vomiting, including reports of bloody emesis.    Patient originally admitted to ICU for sepsis 2/2 CAUTI and suspected UGI bleeding.  Initial CT of abd/pelvis notable for circumferential wall thickening of the distal esophagus c/w esophagitis and bladder wall thickening but otherwise negative for intraabdominal pathology.  Started on IV vancomycin + cefepime, and IV Protonix.  BP rapidly improved with IV fluids and patient downgraded to AI unit on 5/29.  Underwent EGD on 5/30 which showed ulcerative esophagitis.  Hospital course further complicated by development of Pseudomonas bacteremia (pan-sensitive).  CBC overall has remained stable since admission (HGB 8.4-8.9), repeat blood cultures negative.  Failed TOV, Coude Viera catheter reinserted 6/6.  Per chart review, patient does not want to return to [facility], asking for different facility, frustrated by delay in discharge (no accepting facilities yet), ? now refusing to participate in PT and speech therapy, refusing to eat.  WBC spiked to 12 on 6/8.  Also started developing worsening SHEYLA with increasing Cr from 6/7 (Cr 4.91 when I originally saw patient on 6/9).  Seen by Psychiatry/BH on 6/8, no changes in home Psychiatric meds, suggested adding Haldol IM 1 mg Q 6 PRN severe agitation.      On 6/9, patient noted to have worsening confusion and disorientation--then RRT called late afternoon 6/9 for hypotension, fever, and anemia, patient transferred back to ICU with concern for evolving septic vs. hypovolemic shock.  Stared back on Levophed, antibiotics continued.  Repeat blood cultures from 6/10 have remained NGTD.  Transfused 2 units PRBCs for Hgb of 5.3, Hgb has remained stable ~ 9.0 post transfusion.  Underwent repeat EGD earlier today which showed significant improvement of prior esophagitis with no active bleeding (per ICU team report)    Patient examined at bedside this afternoon.  Currently alert and oriented x 2, still with some confusion.  No complaints of pain or SOB.  General:  NAD  HEENT:  PERRLA EOMI anicteric, pharynx clear  Lungs:  Mild crackles bilaterally, otherwise clear to ausculation, respirations unlabored  Heart:  RSR normal S1 and S2, no murmur  Abd: soft, NT, ND normal bowel sounds  Extremities:  trace edema     Medical decision making as documented above by Dr. Forbes  ICU had tentatively arranged for family meeting with patient's brother (Germán Rhoades) to discuss prognosis and GOC, but brother did not come in today (as of 5 PM this afternoon).  Per previous GOC discussion with patient on 5/30, MOLST orders completed for CPR and trial of intubation/BIPAP. Patient had previously stated he would not want ventilation via trach or long term enteral feeds.     Patient is at high risk of increased morbidity and mortality, including further functional decline, worsening skin failure, recurrent infections, further decline in renal and cardiac status, and recurrent hospitalizations (patient already with multiple hospital admits since 2021).  He would be at significant risk of requiring dialysis in the future.  He is at increased risk of death over the next 6-12 months and would be low threshold for hospice, depending upon his future clinical course.  Give his multiple medical comorbidities, he would be unlikely to have a good functional outcome in the event of in-hospital cardiac arrest requiring CPR.    Continue management as per Nephrology, ID, and primary medical team  Patient remains Full Code for now  Will continue attempts to contact brother (Germán Rhoades) for further GOC discussion  Palliative care team will continue to follow.

## 2023-06-12 NOTE — CONSULT NOTE ADULT - PROBLEM SELECTOR RECOMMENDATION 3
on sucralfate   currently NPO  plan for repeat EGD today given drop in H/H and ongoing symptoms
Associated with hx of seizure disorder, no evidence of active seizures currently  Continue home trazodone, buproprion XL, and Divalproex  Agitation likely 2/2 delirium/metabolic encephalopathy, would avoid giving PRN Haldol or other meds for mental status unless agitation is severe.

## 2023-06-12 NOTE — PROGRESS NOTE ADULT - SUBJECTIVE AND OBJECTIVE BOX
Patient is a 63y old  Male who presents with a chief complaint of sepsis (12 Jun 2023 18:55)    PATIENT IS SEEN AND EXAMINED IN MEDICAL FLOOR.  SOWMYA [    ]    ALYSIA [   ]      GT [   ]    ALLERGIES:  No Known Allergies      Daily     Daily     VITALS:    Vital Signs Last 24 Hrs  T(C): 36.1 (12 Jun 2023 19:44), Max: 36.5 (12 Jun 2023 17:01)  T(F): 97 (12 Jun 2023 19:44), Max: 97.7 (12 Jun 2023 17:01)  HR: 75 (12 Jun 2023 22:15) (53 - 109)  BP: 103/50 (12 Jun 2023 22:15) (90/51 - 130/65)  BP(mean): 63 (12 Jun 2023 22:15) (48 - 105)  RR: 19 (12 Jun 2023 22:15) (15 - 34)  SpO2: 99% (12 Jun 2023 22:15) (92% - 100%)    Parameters below as of 12 Jun 2023 00:00  Patient On (Oxygen Delivery Method): room air        LABS:    CBC Full  -  ( 12 Jun 2023 18:10 )  WBC Count : 8.76 K/uL  RBC Count : 3.20 M/uL  Hemoglobin : 9.3 g/dL  Hematocrit : 28.1 %  Platelet Count - Automated : 72 K/uL  Mean Cell Volume : 87.8 fl  Mean Cell Hemoglobin : 29.1 pg  Mean Cell Hemoglobin Concentration : 33.1 gm/dL  Auto Neutrophil # : x  Auto Lymphocyte # : x  Auto Monocyte # : x  Auto Eosinophil # : x  Auto Basophil # : x  Auto Neutrophil % : x  Auto Lymphocyte % : x  Auto Monocyte % : x  Auto Eosinophil % : x  Auto Basophil % : x    PT/INR - ( 11 Jun 2023 16:10 )   PT: 20.6 sec;   INR: 1.72 ratio         PTT - ( 11 Jun 2023 16:10 )  PTT:51.0 sec  06-12    144  |  116<H>  |  53<H>  ----------------------------<  175<H>  4.1   |  19<L>  |  4.94<H>    Ca    8.5      12 Jun 2023 13:19  Phos  2.8     06-12  Mg     1.5     06-12    TPro  4.7<L>  /  Alb  1.8<L>  /  TBili  0.3  /  DBili  x   /  AST  16  /  ALT  7<L>  /  AlkPhos  81  06-12    CAPILLARY BLOOD GLUCOSE            LIVER FUNCTIONS - ( 12 Jun 2023 13:19 )  Alb: 1.8 g/dL / Pro: 4.7 g/dL / ALK PHOS: 81 U/L / ALT: 7 U/L DA / AST: 16 U/L / GGT: x           Creatinine Trend: 4.94<--, 5.22<--, 5.31<--, 5.23<--, 4.79<--, 4.73<--  I&O's Summary    11 Jun 2023 07:01  -  12 Jun 2023 07:00  --------------------------------------------------------  IN: 784.3 mL / OUT: 1575 mL / NET: -790.7 mL    12 Jun 2023 07:01  -  12 Jun 2023 23:44  --------------------------------------------------------  IN: 2237.6 mL / OUT: 360 mL / NET: 1877.6 mL            .Sputum Sputum  06-10 @ 10:23   Normal Respiratory Ivelisse present  --    Few polymorphonuclear leukocytes per low power field  Few Squamous epithelial cells per low power field  Rare Yeast like cells per oil power field  Few Gram Variable Rods per oil power field      .Blood Blood-Peripheral  06-10 @ 04:42   No growth to date.  --  --      .Blood Blood-Peripheral  06-10 @ 03:40   No growth to date.  --  --      .Blood Blood  06-01 @ 10:52   No Growth Final  --  --      .Blood Blood  06-01 @ 10:46   No Growth Final  --  --      .Blood Blood-Peripheral  05-28 @ 21:09   No Growth Final  --  Blood Culture PCR  Pseudomonas aeruginosa      Clean Catch Clean Catch (Midstream)  05-28 @ 21:02   >=3 organisms. Probable collection contamination.  --  --          MEDICATIONS:    MEDICATIONS  (STANDING):  albuterol    90 MICROgram(s) HFA Inhaler 2 Puff(s) Inhalation every 6 hours  buPROPion XL (24-Hour) . 150 milliGRAM(s) Oral daily  chlorhexidine 2% Cloths 1 Application(s) Topical <User Schedule>  dorzolamide 2% Ophthalmic Solution 1 Drop(s) Right EYE <User Schedule>  finasteride 5 milliGRAM(s) Oral daily  norepinephrine Infusion 0.05 MICROgram(s)/kG/Min (3.95 mL/Hr) IV Continuous <Continuous>  nystatin Powder 1 Application(s) Topical two times a day  pantoprazole  Injectable 40 milliGRAM(s) IV Push every 24 hours  sodium bicarbonate 1300 milliGRAM(s) Oral three times a day  sucralfate suspension 1 Gram(s) Oral four times a day  tamsulosin 0.4 milliGRAM(s) Oral at bedtime  traZODone 50 milliGRAM(s) Oral at bedtime  valproate sodium  IVPB 500 milliGRAM(s) IV Intermittent two times a day  vitamin A &amp; D Ointment 1 Application(s) Topical at bedtime      MEDICATIONS  (PRN):  ondansetron Injectable 4 milliGRAM(s) IV Push every 8 hours PRN Nausea and/or Vomiting  sodium chloride 0.9% lock flush 10 milliLiter(s) IV Push every 1 hour PRN Pre/post blood products, medications, blood draw, and to maintain line patency      REVIEW OF SYSTEMS:                           ALL ROS DONE [ X   ]    CONSTITUTIONAL:  LETHARGIC [   ], FEVER [   ], UNRESPONSIVE [   ]  CVS:  CP  [   ], SOB, [   ], PALPITATIONS [   ], DIZZYNESS [   ]  RS: COUGH [   ], SPUTUM [   ]  GI: ABDOMINAL PAIN [   ], NAUSEA [   ], VOMITINGS [   ], DIARRHEA [   ], CONSTIPATION [   ]  :  DYSURIA [   ], NOCTURIA [   ], INCREASED FREQUENCY [   ], DRIBLING [   ],  SKELETAL: PAINFUL JOINTS [   ], SWOLLEN JOINTS [   ], NECK ACHE [   ], LOW BACK ACHE [   ],  SKIN : ULCERS [   ], RASH [   ], ITCHING [   ]  CNS: HEAD ACHE [   ], DOUBLE VISION [   ], BLURRED VISION [   ], AMS / CONFUSION [   ], SEIZURES [   ], WEAKNESS [   ],TINGLING / NUMBNESS [   ]    PHYSICAL EXAMINATION:    GENERAL APPEARANCE: NO DISTRESS  HEENT:  NO PALLOR, NO  JVD,  NO   NODES, NECK SUPPLE  CVS: S1 +, S2 +,   RS: AEEB,  OCCASIONAL  RALES +,   NO RONCHI  ABD: SOFT, NT, NO, BS +  EXT: PE+  SKIN: WARM,   SKELETAL:  ROM REDUCED AT CERVICAL & LS SPINE  CNS:  AAO X 2-3      RADIOLOGY :    RADIOLOGY AND READINGS REVIEWED    ASSESSMENT :       PLAN:  HPI:  64 y/o M, A&Ox3, bedbound, chronic daley catheter with CKD (baseline creat 3.8), COPD, CHFpEF, BPH, PVD, Lymphedema, decubitus ulcers (Sacrum/gluteal) , Anemia, poly-neuropathy, poly-arthritis, hypothyroidism, seizures and Bipolar disorder was BIB EMS from [place] for abdominal pain, [place]. Mr. Rhoades states that he developed acute onset sharp abdominal pain 4 days ago a/w "brown" emesis (witnessed by EMS). Unable to tolerate food/drink x 4 days. Having normal bowel movements, last this morning 5/28. Denies any fevers. NH paperwork states concern for obstruction.  Patient endorses BLOODY EMESIS x 4 days upon further questioning. (29 May 2023 01:36)      # [6/9] - RRT CALLED FOR HYPOTENSION, FEVER AND ANEMIA - PATIENT GIVEN PRBC TRANSFUSION, VANCOMYCIN ADDED, TRANSFERRED FROM SCU TO ICU    # RESOLVING SEPSIS S/T P.AERUGINOSA BACTEREMIA + COMPLICATED UTI    - ON CEFEPIME, BCX [P. AERUGINOSA] AND UCX [ > 3 ORGANISMS], REPEAT BCX - NGTD  - ID CONSULT  - CRITICAL CARE EVALUATION IN PROGRESS    - PLACED ON VASOPRESSORS  - VANCOMYCIN + CEFEPIME, F/U REPEAT BCX    # RESOLVING GI BLEED  # ESOPHAGITIS  # ANEMIA OF CHRONIC DISEASE   # THROMBOCYTOPENIA  - MONITOR HGB, TRANSFUSION THRESHOLD HGB < 8  - TREND PLT  - S/P EGD - ULCERATIVE ESOPHAGITIS  - PPI BID  - CARAFATE QID  - ADVANCING DIET PER GI RECOMMENDATION   - GI CONSULT  - HEME/ONC CONSULT    - [6/6] - EPISODE OF NAUSEA/VOMITING - MONITORING CLOSELY, PRN ANTIEMETICS    - [6/10] - GIVEN PRBC TRANSFUSION, PLANNED FOR 1 MORE UNIT OF PRBC, PLT AND FFP, DEPAKOTE STOPPED GIVEN THROMBOCYTOPENIA    - [6/12] - PLANNED FOR POSSIBLE EGD    # ANEMIA DUE TO GI BLEED, ANEMIA OF CKD     # CHRONIC HYPOXIC RESPIRATORY FAILURE S/T UNDERLYING COPD  - ON PRN O2  - CRITICAL CARE EVALUATION IN PROGRESS    # AMS DUE TO ACUTE METABOLIC ENCEPHALOPATHY    # SHEYLA ON CKD STAGE 4  - WORSENING  # CHRONIC DALEY, BPH  - DALEY   - STRICT IS AND OS  - AVOID NEPHROTOXIC AGENTS  - MONITOR CR  - NEPHROLOGY CONSULT    - [6/7] - URINARY RETENTION OVERNIGHT - DALEY PLACED    # AMBULATORY DYSFUNCTION, IMPAIRED GAIT DUE TO GENERALIZED MUSCLE WEAKNESS, CERVICAL & LS SPINAL STENOSIS, POLYARTRHITIS & PERIPHERAL NEUROPATHY. OP  - FALL PRECAUTIONS    # DYSPHAGIA DUE TO METABOLIC ENCEPHALOPATHY - ON MODIFIED DIET PER SWALLOW EVAL     # HX OF SEIZURE DX  - ON VALPROIC ACID    # HYPOTHYROIDISM - ON LEVOTHYROXINE    # PRESSURE ULCERS  - PATIENT IS HIGH RISK FOR PRESSURE ULCERS DESPITE PREVENTIVE MEASURES IN PLACE  - WOUND CARE CONSULT    # GI PPX

## 2023-06-12 NOTE — CONSULT NOTE ADULT - SUBJECTIVE AND OBJECTIVE BOX
Consult to: Discuss complex medical decision making related to goals of care    [facility] Geriatric and Palliative Consult Service:  Dr. Paradise Bustamante: cell ([phone])  Dr. Wilfred Palmer ([phone])  Bubba Kelly NP: cell [phone])   Angela King NP: cell ([phone])  Avery Santiago LMSW: cell ([phone])     HPI:  64 y/o M, A&Ox3, bedbound, chronic daley catheter with CKD (baseline creat 3.8), COPD, CHFpEF, BPH, PVD, Lymphedema, decubitus ulcers (Sacrum/gluteal) , Anemia, poly-neuropathy, poly-arthritis, hypothyroidism, seizures and Bipolar disorder was BIB EMS from [place] for abdominal pain, [place]. Mr. Rhoades states that he developed acute onset sharp abdominal pain 4 days ago a/w "brown" emesis (witnessed by EMS). Unable to tolerate food/drink x 4 days. Having normal bowel movements, last this morning 5/28. Denies any fevers. NH paperwork states concern for obstruction.  Patient endorses BLOODY EMESIS x 4 days upon further questioning. (29 May 2023 01:36)      PAST MEDICAL & SURGICAL HISTORY:  Hypothyroid      Hyperlipidemia      Ambulatory dysfunction      Anemia      History of BPH      CKD (chronic kidney disease)      Chronic obstructive pulmonary disease (COPD)      Bipolar disorder      HLD (hyperlipidemia)      BPH (benign prostatic hyperplasia)      Chronic diastolic congestive heart failure      Lymphedema      Chronic leg pain      No significant past surgical history          SOCIAL HISTORY:    Admitted from:  NH      FAMILY HISTORY:  unable to obtain from patient due to poor mentation, family unavailable to give information,      Allergies    No Known Allergies    Intolerances      Present Symptoms:   Pain: denies  Fatigue: denies  Nausea: +ve  Lack of Appetite:  denies  SOB: denies  Depression: denies  Anxiety: mild  Review of Systems: All others negative    MEDICATIONS  (STANDING):  albuterol    90 MICROgram(s) HFA Inhaler 2 Puff(s) Inhalation every 6 hours  buPROPion XL (24-Hour) . 150 milliGRAM(s) Oral daily  chlorhexidine 2% Cloths 1 Application(s) Topical <User Schedule>  dextrose 5%. 1000 milliLiter(s) (50 mL/Hr) IV Continuous <Continuous>  dorzolamide 2% Ophthalmic Solution 1 Drop(s) Right EYE <User Schedule>  finasteride 5 milliGRAM(s) Oral daily  norepinephrine Infusion 0.05 MICROgram(s)/kG/Min (3.95 mL/Hr) IV Continuous <Continuous>  nystatin Powder 1 Application(s) Topical two times a day  pantoprazole  Injectable 40 milliGRAM(s) IV Push every 24 hours  sodium bicarbonate 1300 milliGRAM(s) Oral three times a day  sucralfate suspension 1 Gram(s) Oral four times a day  tamsulosin 0.4 milliGRAM(s) Oral at bedtime  traZODone 50 milliGRAM(s) Oral at bedtime  valproate sodium  IVPB 500 milliGRAM(s) IV Intermittent two times a day  vitamin A &amp; D Ointment 1 Application(s) Topical at bedtime    MEDICATIONS  (PRN):  ondansetron Injectable 4 milliGRAM(s) IV Push every 8 hours PRN Nausea and/or Vomiting  sodium chloride 0.9% lock flush 10 milliLiter(s) IV Push every 1 hour PRN Pre/post blood products, medications, blood draw, and to maintain line patency      PHYSICAL EXAM:  Vital Signs Last 24 Hrs  T(C): 36.5 (12 Jun 2023 17:01), Max: 36.6 (11 Jun 2023 20:00)  T(F): 97.7 (12 Jun 2023 17:01), Max: 97.8 (11 Jun 2023 20:00)  HR: 90 (12 Jun 2023 16:15) (53 - 109)  BP: 98/52 (12 Jun 2023 16:15) (88/47 - 130/65)  BP(mean): 63 (12 Jun 2023 16:15) (48 - 105)  RR: 19 (12 Jun 2023 16:15) (15 - 31)  SpO2: 99% (12 Jun 2023 16:15) (91% - 100%)    Parameters below as of 12 Jun 2023 00:00  Patient On (Oxygen Delivery Method): room air        Palliative Performance Scale/Karnofsky Score:  ECOG Performance: 3    General: alert  oriented x 2 , anxious  HEENT: no abnormal lesion  Lungs: good air movement b/l. Noted to have b/l crackles. No wheezing or ronchus  CV: RRR, S1S2,   GI: soft non distended non tender   Musculoskeletal: weakness x4  Skin: edema on the dorsal area of both hands  Neuro: follows commands      LABS:                        8.9    9.22  )-----------( 88       ( 12 Jun 2023 04:23 )             26.5     06-12    144  |  116<H>  |  53<H>  ----------------------------<  175<H>  4.1   |  19<L>  |  4.94<H>    Ca    8.5      12 Jun 2023 13:19  Phos  2.8     06-12  Mg     1.5     06-12    TPro  4.7<L>  /  Alb  1.8<L>  /  TBili  0.3  /  DBili  x   /  AST  16  /  ALT  7<L>  /  AlkPhos  81  06-12        RADIOLOGY & ADDITIONAL STUDIES:     Consult to: Discuss complex medical decision making related to goals of care    [facility] Geriatric and Palliative Consult Service:  Dr. Paradise Bustamante: cell ([phone])  Dr. Wilfred Palmer ([phone])  Bubba Kelly NP: cell ([phone])   Angela King NP: cell ([phone])  Avery Santiago LMSW: cell ([phone])     HPI:  62 y/o M, A&Ox3, bedbound, chronic daley catheter with CKD (baseline creat 3.8), COPD, CHFpEF, BPH, PVD, Lymphedema, decubitus ulcers (Sacrum/gluteal) , Anemia, poly-neuropathy, poly-arthritis, hypothyroidism, seizures and Bipolar disorder was BIB EMS from [place] for abdominal pain, [place]. Mr. Rhoades states that he developed acute onset sharp abdominal pain 4 days ago a/w "brown" emesis (witnessed by EMS). Unable to tolerate food/drink x 4 days. Having normal bowel movements, last this morning 5/28. Denies any fevers. NH paperwork states concern for obstruction.  Patient endorses BLOODY EMESIS x 4 days upon further questioning. (29 May 2023 01:36)      PAST MEDICAL & SURGICAL HISTORY:  Hypothyroid      Hyperlipidemia      Ambulatory dysfunction      Anemia      History of BPH      CKD (chronic kidney disease)      Chronic obstructive pulmonary disease (COPD)      Bipolar disorder      HLD (hyperlipidemia)      BPH (benign prostatic hyperplasia)      Chronic diastolic congestive heart failure      Lymphedema      Chronic leg pain      No significant past surgical history          SOCIAL HISTORY:    Admitted from:  NH      FAMILY HISTORY:  unable to obtain from patient due to poor mentation, family unavailable to give information,      Allergies    No Known Allergies    Intolerances      Present Symptoms:   Pain: denies  Fatigue: denies  Nausea: +ve  Lack of Appetite:  denies  SOB: denies  Depression: denies  Anxiety: mild  Review of Systems: All others negative    MEDICATIONS  (STANDING):  albuterol    90 MICROgram(s) HFA Inhaler 2 Puff(s) Inhalation every 6 hours  buPROPion XL (24-Hour) . 150 milliGRAM(s) Oral daily  chlorhexidine 2% Cloths 1 Application(s) Topical <User Schedule>  dextrose 5%. 1000 milliLiter(s) (50 mL/Hr) IV Continuous <Continuous>  dorzolamide 2% Ophthalmic Solution 1 Drop(s) Right EYE <User Schedule>  finasteride 5 milliGRAM(s) Oral daily  norepinephrine Infusion 0.05 MICROgram(s)/kG/Min (3.95 mL/Hr) IV Continuous <Continuous>  nystatin Powder 1 Application(s) Topical two times a day  pantoprazole  Injectable 40 milliGRAM(s) IV Push every 24 hours  sodium bicarbonate 1300 milliGRAM(s) Oral three times a day  sucralfate suspension 1 Gram(s) Oral four times a day  tamsulosin 0.4 milliGRAM(s) Oral at bedtime  traZODone 50 milliGRAM(s) Oral at bedtime  valproate sodium  IVPB 500 milliGRAM(s) IV Intermittent two times a day  vitamin A &amp; D Ointment 1 Application(s) Topical at bedtime    MEDICATIONS  (PRN):  ondansetron Injectable 4 milliGRAM(s) IV Push every 8 hours PRN Nausea and/or Vomiting  sodium chloride 0.9% lock flush 10 milliLiter(s) IV Push every 1 hour PRN Pre/post blood products, medications, blood draw, and to maintain line patency      PHYSICAL EXAM:  Vital Signs Last 24 Hrs  T(C): 36.5 (12 Jun 2023 17:01), Max: 36.6 (11 Jun 2023 20:00)  T(F): 97.7 (12 Jun 2023 17:01), Max: 97.8 (11 Jun 2023 20:00)  HR: 90 (12 Jun 2023 16:15) (53 - 109)  BP: 98/52 (12 Jun 2023 16:15) (88/47 - 130/65)  BP(mean): 63 (12 Jun 2023 16:15) (48 - 105)  RR: 19 (12 Jun 2023 16:15) (15 - 31)  SpO2: 99% (12 Jun 2023 16:15) (91% - 100%)    Parameters below as of 12 Jun 2023 00:00  Patient On (Oxygen Delivery Method): room air        Palliative Performance Scale/Karnofsky Score:  30%      General: alert  oriented x 2 , anxious  HEENT: no abnormal lesion  Lungs: good air movement b/l. Noted to have b/l crackles. No wheezing or ronchus  CV: RRR, S1S2,   GI: soft non distended non tender   Musculoskeletal: weakness x4  Skin: edema on the dorsal area of both hands  Neuro: follows commands      LABS:                        8.9    9.22  )-----------( 88       ( 12 Jun 2023 04:23 )             26.5     06-12    144  |  116<H>  |  53<H>  ----------------------------<  175<H>  4.1   |  19<L>  |  4.94<H>    Ca    8.5      12 Jun 2023 13:19  Phos  2.8     06-12  Mg     1.5     06-12    TPro  4.7<L>  /  Alb  1.8<L>  /  TBili  0.3  /  DBili  x   /  AST  16  /  ALT  7<L>  /  AlkPhos  81  06-12        RADIOLOGY & ADDITIONAL STUDIES:

## 2023-06-12 NOTE — CONSULT NOTE ADULT - PROBLEM SELECTOR RECOMMENDATION 9
pt with progressively worsening  renal function  does not meet criteria as of yet for urgent HD  Nephro following

## 2023-06-12 NOTE — PROGRESS NOTE ADULT - SUBJECTIVE AND OBJECTIVE BOX
Time of Visit:  Patient seen and examined.     MEDICATIONS  (STANDING):  albuterol    90 MICROgram(s) HFA Inhaler 2 Puff(s) Inhalation every 6 hours  buPROPion XL (24-Hour) . 150 milliGRAM(s) Oral daily  chlorhexidine 2% Cloths 1 Application(s) Topical <User Schedule>  dextrose 5%. 1000 milliLiter(s) (50 mL/Hr) IV Continuous <Continuous>  dorzolamide 2% Ophthalmic Solution 1 Drop(s) Right EYE <User Schedule>  finasteride 5 milliGRAM(s) Oral daily  magnesium sulfate  IVPB 1 Gram(s) IV Intermittent once  norepinephrine Infusion 0.05 MICROgram(s)/kG/Min (3.95 mL/Hr) IV Continuous <Continuous>  pantoprazole  Injectable 40 milliGRAM(s) IV Push every 24 hours  sodium bicarbonate 1300 milliGRAM(s) Oral three times a day  sucralfate suspension 1 Gram(s) Oral four times a day  tamsulosin 0.4 milliGRAM(s) Oral at bedtime  traZODone 50 milliGRAM(s) Oral at bedtime  valproate sodium  IVPB 500 milliGRAM(s) IV Intermittent two times a day  vitamin A &amp; D Ointment 1 Application(s) Topical at bedtime      MEDICATIONS  (PRN):  ondansetron Injectable 4 milliGRAM(s) IV Push every 8 hours PRN Nausea and/or Vomiting  sodium chloride 0.9% lock flush 10 milliLiter(s) IV Push every 1 hour PRN Pre/post blood products, medications, blood draw, and to maintain line patency       Medications up to date at time of exam.      PHYSICAL EXAMINATION:  Patient has no new complaints.  GENERAL: The patient is a well-developed, well-nourished, in no apparent distress.     Vital Signs Last 24 Hrs  T(C): 36.4 (12 Jun 2023 12:00), Max: 36.6 (11 Jun 2023 20:00)  T(F): 97.5 (12 Jun 2023 12:00), Max: 97.8 (11 Jun 2023 20:00)  HR: 61 (12 Jun 2023 14:00) (53 - 126)  BP: 114/69 (12 Jun 2023 14:00) (88/47 - 130/65)  BP(mean): 79 (12 Jun 2023 14:00) (48 - 87)  RR: 19 (12 Jun 2023 14:00) (15 - 31)  SpO2: 100% (12 Jun 2023 14:00) (91% - 100%)    Parameters below as of 12 Jun 2023 00:00  Patient On (Oxygen Delivery Method): room air       (if applicable)    Chest Tube (if applicable)    HEENT: Head is normocephalic and atraumatic. Extraocular muscles are intact. Mucous membranes are moist.     NECK: Supple, no palpable adenopathy.    LUNGS: Clear to auscultation, no wheezing, rales, or rhonchi.    HEART: Regular rate and rhythm without murmur.    ABDOMEN: Soft, nontender, and nondistended.  No hepatosplenomegaly is noted.    : No painful voiding, no pelvic pain    EXTREMITIES: Without any cyanosis, clubbing, rash, lesions or edema.    NEUROLOGIC: Awake, alert, oriented, grossly intact    SKIN: Warm, dry, good turgor.      LABS:                        8.9    9.22  )-----------( 88       ( 12 Jun 2023 04:23 )             26.5     06-12    144  |  116<H>  |  53<H>  ----------------------------<  175<H>  4.1   |  19<L>  |  4.94<H>    Ca    8.5      12 Jun 2023 13:19  Phos  2.8     06-12  Mg     1.5     06-12    TPro  4.7<L>  /  Alb  1.8<L>  /  TBili  0.3  /  DBili  x   /  AST  16  /  ALT  7<L>  /  AlkPhos  81  06-12    PT/INR - ( 11 Jun 2023 16:10 )   PT: 20.6 sec;   INR: 1.72 ratio         PTT - ( 11 Jun 2023 16:10 )  PTT:51.0 sec                Procalcitonin, Serum: 0.67 ng/mL (06-09-23 @ 18:40)      MICROBIOLOGY: (if applicable)    RADIOLOGY & ADDITIONAL STUDIES:  EKG:   CXR:  ECHO:    IMPRESSION: 63y Male PAST MEDICAL & SURGICAL HISTORY:  Hypothyroid      Hyperlipidemia      Ambulatory dysfunction      Anemia      History of BPH      CKD (chronic kidney disease)      Chronic obstructive pulmonary disease (COPD)      Bipolar disorder      HLD (hyperlipidemia)      BPH (benign prostatic hyperplasia)      Chronic diastolic congestive heart failure      Lymphedema      Chronic leg pain      No significant past surgical history       p/w           RECOMMENDATIONS:

## 2023-06-12 NOTE — PROGRESS NOTE ADULT - ASSESSMENT
63 year old Male, from [place], AOx3, ambulates with walker , chronic FC, h/o obesity, HTN, HLD, PVD, Seizures, CKD4 (base SCr 3.8), anemia, BPH, Lymphedema, hypothyroidism, HFpEF, possible COPD, decubitus ulcer (sacrum/gluteal), polyneuropathy, polyarthritis, bipolar disorder, presented to ED for abdominal pain, nausea and vomiting brown emesis. Admitted to ICU for Hypotension likely from Sepsis secondary to complicated UTI and esophagitis. Readmitted to ICU status post RRT for hypotension and concern for evolving septic vs hypovolemic shock.     Plan  ====Neuro====  #Baseline mentation: AAOx2   - lethargic, likely due to metabolic encephalopathy in setting of shock state and organ dysfunction    #Seizure  - Continue Valproic Acid IVPB 500mg BID  - no seizure activities, seizure precautions in place     #Bipolar disorder.   - cont. w/ Wellbutrin, Trazadone  - Psych following  - Continue Depakote    #Failure to thrive  - Psych and Palliative following    ====CVS====  #Sepsis 2/2 pseudomonas bacteremia and UTI   #Hemorrhagic shock 2/2 Ulcerative esophagitis  - was already on cefepime (last day 6/11 to complete a total of 14 days), d/c on 612  - on levophed @0.05 for BP support  - Given 1x bolus of 1L LR   - sputum culture 6/10 neg, Blood Cx 6/10 Neg and cleared from 6/1, UA 6/6 +  - ID following, f/u ID recs    ====Pulm====  #No acute issues  - cont with NC   - monitor O2 sat and titrate off as indicated  - repeat CXR today -> repeat CXR unchanged from prior  - +Staph on Nares PCR    ====GI====  #Diet: NPO  #Bowel regimen: none    #Ulcerative esophagitis  - cont. w/ PPI and Carafate  - s/p EGD 5/30/23 -> contact GI in AM 6/12 for possible repeat EGD given patient required 2units PRBCs on 6/10  - Hgb 6/12 stable @ 8.9, no signs of active bleeding or hematemesis   - GI following, appreciate recs    ====Renal====  #SHEYLA on CKD  #Hypokalemia  - Cr uptrending  - monitor BMP and UOP  - replete elctrolytes prn, replete K if less than 3  - nephro following  - bicarb gtt d/cd 6/11, trend venous pH    #BPH  - cont with finasteride and tamsulosin  - on daley    ====ID====  #Sepsis 2/2 pseudomonas bacteremia and UTI   - was already on cefepime (last day 6/11 to complete a total of 14 days), d/c on 612  - sputum culture 6/10 neg, Blood Cx 6/10 Neg and cleared from 6/1, UA 6/6 +  - Cefepime d/c iso thrombocytopenia  - Started on Zosyn 6/12-   - ID following, f/u ID recs    ====Heme/Onc====  #thrombocytopenia likelt 2/2 cefepime  -HIT panel negative  -fibrinogen low, PTT elevated, but d-dimer low; inconsistent with DIC  - PLT 88 from 103  - follow CBC from 6/12  - keep PLT > 50  - heme/onc following : QMA    #anemia  -h/o suspected GIB, s/p EGD; negative for GIB (5/30), repeat EGD 6/12 with improving esophagitis and no signs of active bleeding  -s/p 2 units PRBCs with appropriate response  6.8> 9.7  -no evidence of hemolysis, hapto and bili WNL  -transfuse < 7    ====Endo====  #bG goal: 140-180  - ISS for poor oral intake    #Hypothyroidism   - continue synthroid 137mcg daily    ====Skin/lines====  #peripheral: 2 peripherals in each arm  #CVC: [facility] (6/9)  #daley: yes    ====Ppx====  #DVT: hold given acute anemia  #GI: PPI     Emergency Contact: Brother Germán  63 year old Male, from [place], AOx3, ambulates with walker , chronic FC, h/o obesity, HTN, HLD, PVD, Seizures, CKD4 (base SCr 3.8), anemia, BPH, Lymphedema, hypothyroidism, HFpEF, possible COPD, decubitus ulcer (sacrum/gluteal), polyneuropathy, polyarthritis, bipolar disorder, presented to ED for abdominal pain, nausea and vomiting brown emesis. Admitted to ICU for Hypotension likely from Sepsis secondary to complicated UTI and esophagitis. Readmitted to ICU status post RRT for hypotension and concern for evolving septic vs hypovolemic shock.     Plan  ====Neuro====  #Baseline mentation: AAOx2   - lethargic, likely due to metabolic encephalopathy in setting of shock state and organ dysfunction    #Seizure  - Continue Valproic Acid IVPB 500mg BID  - no seizure activities, seizure precautions in place     #Bipolar disorder.   - cont. w/ Wellbutrin, Trazadone  - Psych following  - Continue Depakote    #Failure to thrive  - Psych and Palliative following    ====CVS====  #Sepsis 2/2 pseudomonas bacteremia and UTI   #Hemorrhagic shock 2/2 Ulcerative esophagitis  - was already on cefepime (last day 6/11 to complete a total of 14 days), d/c on 612  - on levophed @0.05 for BP support  - Given 1x bolus of 1L LR   - sputum culture 6/10 neg, Blood Cx 6/10 Neg and cleared from 6/1, UA 6/6 +  - ID following, f/u ID recs    ====Pulm====  #No acute issues  - cont with NC   - monitor O2 sat and titrate off as indicated  - repeat CXR today -> repeat CXR unchanged from prior  - +Staph on Nares PCR    ====GI====  #Diet: NPO  #Bowel regimen: none    #Ulcerative esophagitis  - cont. w/ PPI and Carafate  - s/p EGD 5/30/23 -> contact GI in AM 6/12 for possible repeat EGD given patient required 2units PRBCs on 6/10  - Hgb 6/12 stable @ 8.9, no signs of active bleeding or hematemesis   - GI following, appreciate recs  - Send FOBT with neg BM    ====Renal====  #SHEYLA on CKD  #Hypokalemia likley 2/2 Distal RTA  - Cr uptrending  - monitor BMP and UOP  - replete elctrolytes prn, replete K if less than 3  - Maintain K>4, Mag >2, Ph >3  - nephro following  - bicarb gtt d/cd 6/11, trend venous pH    #BPH  - cont with finasteride and tamsulosin  - on daley    ====ID====  #Sepsis 2/2 pseudomonas bacteremia and UTI   - was already on cefepime (last day 6/11 to complete a total of 14 days), d/c on 612  - sputum culture 6/10 neg, Blood Cx 6/10 Neg and cleared from 6/1, UA 6/6 +  - Cefepime d/c iso thrombocytopenia  - Started on Zosyn 6/12-   - ID following, f/u ID recs    ====Heme/Onc====  #thrombocytopenia likelt 2/2 cefepime  -HIT panel negative, XENIA negative  -fibrinogen low, PTT elevated, but d-dimer low; inconsistent with DIC  - PLT 88 from 103, no active bleeding  - follow CBC from 6/12  - keep PLT > 50  - heme/onc following : QMA    #anemia  -h/o suspected GIB, s/p EGD; negative for GIB (5/30), repeat EGD 6/12 with improving esophagitis and no signs of active bleeding  -s/p 2 units PRBCs with appropriate response  6.8> 9.7  -no evidence of hemolysis, hapto and bili WNL  -transfuse < 7    ====Endo====  #bG goal: 140-180  - ISS for poor oral intake    #Hypothyroidism   - continue synthroid 137mcg daily    ====Skin/lines====  #peripheral: 2 peripherals in each arm  #CVC: [facility] (6/9)  #daley: yes    ====Ppx====  #DVT: hold given acute anemia  #GI: PPI     Emergency Contact: Brother Germán  63 year old Male, from [place], AOx3, ambulates with walker , chronic FC, h/o obesity, HTN, HLD, PVD, Seizures, CKD4 (base SCr 3.8), anemia, BPH, Lymphedema, hypothyroidism, HFpEF, possible COPD, decubitus ulcer (sacrum/gluteal), polyneuropathy, polyarthritis, bipolar disorder, presented to ED for abdominal pain, nausea and vomiting brown emesis. Admitted to ICU for Hypotension likely from Sepsis secondary to complicated UTI and esophagitis. Readmitted to ICU status post RRT for hypotension and concern for evolving septic vs hypovolemic shock.     Plan  ====Neuro====  #Baseline mentation: AAOx2   - lethargic, likely due to metabolic encephalopathy in setting of shock state and organ dysfunction    #Seizure  - Continue Valproic Acid IVPB 500mg BID  - no seizure activities, seizure precautions in place     #Bipolar disorder.   - cont. w/ Wellbutrin, Trazadone  - Psych following  - Continue Depakote    #Failure to thrive  - Psych and Palliative following    ====CVS====  #Sepsis 2/2 pseudomonas bacteremia and UTI   #Hemorrhagic shock 2/2 Ulcerative esophagitis  - was already on cefepime (last day 6/11 to complete a total of 14 days), d/c on 612  - on levophed @0.05 for BP support  - Given 1x bolus of 1L LR   - sputum culture 6/10 neg, Blood Cx 6/10 Neg and cleared from 6/1, UA 6/6 +  - ID following, f/u ID recs    ====Pulm====  #No acute issues  - cont with NC   - monitor O2 sat and titrate off as indicated  - repeat CXR today -> repeat CXR unchanged from prior  - +Staph on Nares PCR    ====GI====  #Diet: NPO  #Bowel regimen: none    #Ulcerative esophagitis  - cont. w/ PPI and Carafate  - s/p EGD 5/30/23 -> contact GI in AM 6/12 for possible repeat EGD given patient required 2units PRBCs on 6/10  - Hgb 6/12 stable @ 8.9, no signs of active bleeding or hematemesis   - GI following, appreciate recs  - Send FOBT with neg BM    ====Renal====  #SHEYLA on CKD  #Hypokalemia likely 2/2 Distal RTA  - Cr plateaued 5.2 now 4.94   - monitor BMP and UOP  - no emergent need for HD at this time  - replete elctrolytes prn, replete K if less than 3  - Maintain K>4, Mag >2, Ph >3  - nephro following  - bicarb gtt d/cd 6/11, trend venous pH    #BPH  - cont with finasteride and tamsulosin  - on daley    ====ID====  #Sepsis 2/2 pseudomonas bacteremia and UTI   - was already on cefepime (last day 6/11 to complete a total of 14 days), d/c on 612  - sputum culture 6/10 neg, Blood Cx 6/10 Neg and cleared from 6/1, UA 6/6 +  - Cefepime d/c iso thrombocytopenia  - Started on Zosyn 6/12-   - ID following, f/u ID recs    ====Heme/Onc====  #thrombocytopenia likelt 2/2 cefepime  -HIT panel negative, XENIA negative  -fibrinogen low, PTT elevated, but d-dimer low; inconsistent with DIC  - PLT 88 from 103, no active bleeding  - follow CBC from 6/12  - keep PLT > 50  - heme/onc following : QMA    #anemia  -h/o suspected GIB, s/p EGD; negative for GIB (5/30), repeat EGD 6/12 with improving esophagitis and no signs of active bleeding  -s/p 2 units PRBCs with appropriate response  6.8> 9.7  -no evidence of hemolysis, hapto and bili WNL  -transfuse < 7    ====Endo====  #bG goal: 140-180  - ISS for poor oral intake    #Hypothyroidism   - continue synthroid 137mcg daily    ====Skin/lines====  #Sacral and right gluteal pressure ulcer   - Wound care as ordered   #peripheral: 2 peripherals in each arm  #CVC: [facility] (6/9)  #daley: yes    ====Ppx====  #DVT: hold given acute anemia  #GI: PPI   #Palliative care following    Emergency Contact: Brother Germán

## 2023-06-12 NOTE — PROGRESS NOTE ADULT - SUBJECTIVE AND OBJECTIVE BOX
GI Progress Note    Patient is a 63y old  Male who presents with a chief complaint of sepsis (12 Jun 2023 11:29)    GI was consulted for esophagitis.  GI ws re-consulted for anemia and repeat endoscopic evaluation.     24-HOUR INTERVAL EVENTS: Patient resting in bed, endorses n/v (brown colored emesis), unable to tolerate PO intake. GI was re-engaged for repeat endoscopic evaluation given drop in Hgb on 6/9 requiring 2u PRBC. Tentative repeat EGD this afternoon, patient & primary team aware.     MEDICATIONS  (STANDING):  albuterol    90 MICROgram(s) HFA Inhaler 2 Puff(s) Inhalation every 6 hours  buPROPion XL (24-Hour) . 150 milliGRAM(s) Oral daily  chlorhexidine 2% Cloths 1 Application(s) Topical <User Schedule>  dextrose 5%. 1000 milliLiter(s) (50 mL/Hr) IV Continuous <Continuous>  dorzolamide 2% Ophthalmic Solution 1 Drop(s) Right EYE <User Schedule>  finasteride 5 milliGRAM(s) Oral daily  norepinephrine Infusion 0.05 MICROgram(s)/kG/Min (3.95 mL/Hr) IV Continuous <Continuous>  pantoprazole  Injectable 40 milliGRAM(s) IV Push every 24 hours  sodium bicarbonate 1300 milliGRAM(s) Oral three times a day  sucralfate suspension 1 Gram(s) Oral four times a day  tamsulosin 0.4 milliGRAM(s) Oral at bedtime  traZODone 50 milliGRAM(s) Oral at bedtime  valproate sodium  IVPB 500 milliGRAM(s) IV Intermittent two times a day  vancomycin  IVPB 1500 milliGRAM(s) IV Intermittent once  vitamin A &amp; D Ointment 1 Application(s) Topical at bedtime    MEDICATIONS  (PRN):  ondansetron Injectable 4 milliGRAM(s) IV Push every 8 hours PRN Nausea and/or Vomiting  sodium chloride 0.9% lock flush 10 milliLiter(s) IV Push every 1 hour PRN Pre/post blood products, medications, blood draw, and to maintain line patency    __________________________________________________  REVIEW OF SYSTEMS:  A detailed set of ROS were asked and negative except those outlined in GI HPI above/below.   ________________________________________________  PHYSICAL EXAM    Vital Signs Last 24 Hrs  T(C): 36.4 (12 Jun 2023 08:00), Max: 36.6 (11 Jun 2023 20:00)  T(F): 97.6 (12 Jun 2023 08:00), Max: 97.8 (11 Jun 2023 20:00)  HR: 86 (12 Jun 2023 12:15) (53 - 126)  BP: 121/74 (12 Jun 2023 12:00) (88/47 - 130/65)  BP(mean): 84 (12 Jun 2023 12:00) (48 - 88)  RR: 25 (12 Jun 2023 12:15) (15 - 31)  SpO2: 100% (12 Jun 2023 12:15) (91% - 100%)    Parameters below as of 12 Jun 2023 00:00  Patient On (Oxygen Delivery Method): room air        GEN: NAD  HEENT: EOMI, conjunctivae anicteric, neck supple, moist mucous membranes  PULM: LCTAB, no wheezing, rales, or rhonchi  CV: RRR, no m/r/g  GI: soft, NT, ND; +BS in all four quadrants, no ascites, no Ren[place]'s sign  MSK: [place]  NEURO: A&O x 3, no gross deficits  _________________________________________________  LABS:                        8.9    9.22  )-----------( 88       ( 12 Jun 2023 04:23 )             26.5     06-12    147<H>  |  117<H>  |  56<H>  ----------------------------<  107<H>  3.0<L>   |  20<L>  |  5.22<H>    Ca    9.3      12 Jun 2023 04:23  Phos  2.0     06-12  Mg     1.7     06-12    TPro  5.0<L>  /  Alb  2.0<L>  /  TBili  0.4  /  DBili  x   /  AST  16  /  ALT  7<L>  /  AlkPhos  81  06-12    PT/INR - ( 11 Jun 2023 16:10 )   PT: 20.6 sec;   INR: 1.72 ratio         PTT - ( 11 Jun 2023 16:10 )  PTT:51.0 sec    CAPILLARY BLOOD GLUCOSE        SARS-CoV-2: NotDetec (10 Carlin 2023 10:35)  COVID-19 PCR: NotDetec (07 Jun 2023 06:43)  COVID-19 PCR: NotDetec (04 Jun 2023 18:40)  SARS-CoV-2: NotDetec (28 May 2023 21:02)  COVID-19 PCR: NotDetec (15 Dec 2022 00:41)      RADIOLOGY & ADDITIONAL TESTS:    EGD (5/30/23):  Findings:  Esophagus Mucosa Localized erythema, congestion and ulceration of the mucosa  with no bleeding was noted in the lower third of the esophagus. These findings  are compatible with ulcerative esophagitis.  Stomach Mucosa Normal mucosa was noted in the whole stomach.  Normal mucosa was noted in the cardia and fundus. On retroflexed view, the  stomach appeared to be normal except some diverticulum noted in fundus.  Duodenum Mucosa Normal mucosa was noted in the whole examined duodenum. Retained  food noted in 2nd portion.    Impressions:  Erythema, congestion and ulceration in the lower third of the esophagus  compatible with ulcerative esophagitis.  Normal mucosa in the whole stomach.  Normal mucosa in the cardia and fundus.  Normal mucosa in the whole examined duodenum.

## 2023-06-12 NOTE — PROGRESS NOTE ADULT - ASSESSMENT
SHEYLA: Cr is rising again. S/P shock and bleeding .  On Vasopressors and Urine Output is > 50 mL/hour.  - Follow up BMP.  - MAP > 65.  - Maintain daley for now.  - HD if no improvement.  - No need for urgent HD at this time.      Metabolic Acidosis:  Non AG, likely Distal RTA.  Associated with relatively low K.  - Monitor BMP.    CKD  4  -Renal diet.  - Avoid Nephrotoxins.  - follow up BMP.    CKD Mineral and Bone Disease:  He has Hypophosphatemia now.  Off binders. Likely Renal wasting or poor GI absorption.  - Supplement PO4.  - Vit D levels.  - His corrected Ca was 12.8 so he may have Primary Hyperparathyroidism, and this would explain his low PO4.    Anemia of CKD:  - Transfuse as needed for Hg < 7.    Hypokalemia:  Likely Distal RTA. Mg is wnl.  - Monitor K and Mg.  - Supplement as needed.

## 2023-06-12 NOTE — CONSULT NOTE ADULT - PROBLEM SELECTOR RECOMMENDATION 4
pt noted to have albumin of 2.0  worsened by poor PO intake due to esophagitis  Previous evaluated by nutrition services, recommending for pt to Continue with Nepro BID and appetite stimulant as medically feasible.   Currently NPO in anticipation for potential EGD today Associated with hx of seizure disorder, no evidence of active seizures currently  Continue home trazodone, buproprion XL, and Divalproex

## 2023-06-12 NOTE — CONSULT NOTE ADULT - PROBLEM SELECTOR RECOMMENDATION 2
2/2 hospital acquired PNA  currently on renally dosed vancomycin   continue management as per primary team
Creatinine increased to 4.91 this morning  At bedside, patient indicates that he would want HD if clinically indicated, but presently does not have full capacity to understand risks/benefits  Further management as per Nephrology and primary team

## 2023-06-12 NOTE — CONSULT NOTE ADULT - CONSULT REQUESTED DATE/TIME
10-Carlin-2023 13:20
09-Jun-2023 10:00
30-May-2023 19:52
31-May-2023 18:54
30-May-2023 12:08
29-May-2023 09:54
30-May-2023
09-Jun-2023 19:04
12-Jun-2023 09:31

## 2023-06-12 NOTE — CONSULT NOTE ADULT - PROBLEM SELECTOR RECOMMENDATION 5
Given pt lacking capacity, the plan was to have family discussion with pt's Brother Mr. Germán Rhoades in regards of GOC and ACP. However, pt's brother did not come to the hospital today.   Will plan to have family discussion tomorrow  Will continue to follow   Total encounter time 25-30 min Multiple stage I/II decubitus ulcers of sacrum, bilateral heels, and bilateral buttocks/gluteal area (including stage II of R gluteus muscle), likely due to functional quadriplegia in the setting of multiple medical comorbidities.    Continue present wound care recommendations  Encourage PT as tolerated when medically stable, consider PT reeval  Offloading of heels  Frequent turning and repositioning while in bed.

## 2023-06-12 NOTE — PROGRESS NOTE ADULT - SUBJECTIVE AND OBJECTIVE BOX
Patient is a 63y old  Male who presents with a chief complaint of sepsis       SUBJECTIVE / OVERNIGHT EVENTS:      MEDICATIONS  (STANDING):  albuterol    90 MICROgram(s) HFA Inhaler 2 Puff(s) Inhalation every 6 hours  buPROPion XL (24-Hour) . 150 milliGRAM(s) Oral daily  chlorhexidine 2% Cloths 1 Application(s) Topical <User Schedule>  dextrose 5%. 1000 milliLiter(s) (50 mL/Hr) IV Continuous <Continuous>  divalproex  milliGRAM(s) Oral two times a day  dorzolamide 2% Ophthalmic Solution 1 Drop(s) Right EYE <User Schedule>  finasteride 5 milliGRAM(s) Oral daily  norepinephrine Infusion 0.05 MICROgram(s)/kG/Min (3.95 mL/Hr) IV Continuous <Continuous>  pantoprazole  Injectable 40 milliGRAM(s) IV Push every 24 hours  potassium chloride  20 mEq/100 mL IVPB 20 milliEquivalent(s) IV Intermittent every 2 hours  sodium bicarbonate 1300 milliGRAM(s) Oral three times a day  sucralfate suspension 1 Gram(s) Oral four times a day  tamsulosin 0.4 milliGRAM(s) Oral at bedtime  traZODone 50 milliGRAM(s) Oral at bedtime  vancomycin  IVPB 1500 milliGRAM(s) IV Intermittent once  vitamin A &amp; D Ointment 1 Application(s) Topical at bedtime    MEDICATIONS  (PRN):  ondansetron Injectable 4 milliGRAM(s) IV Push every 8 hours PRN Nausea and/or Vomiting  sodium chloride 0.9% lock flush 10 milliLiter(s) IV Push every 1 hour PRN Pre/post blood products, medications, blood draw, and to maintain line patency    CAPILLARY BLOOD GLUCOSE        I&O's Summary    11 Jun 2023 07:01  -  12 Jun 2023 07:00  --------------------------------------------------------  IN: 784.3 mL / OUT: 1575 mL / NET: -790.7 mL    12 Jun 2023 07:01  -  12 Jun 2023 11:29  --------------------------------------------------------  IN: 506.3 mL / OUT: 175 mL / NET: 331.3 mL        PHYSICAL EXAM:  Vital Signs Last 24 Hrs  T(C): 36.4 (12 Jun 2023 08:00), Max: 36.6 (11 Jun 2023 20:00)  T(F): 97.6 (12 Jun 2023 08:00), Max: 97.8 (11 Jun 2023 20:00)  HR: 56 (12 Jun 2023 11:00) (53 - 126)  BP: 106/67 (12 Jun 2023 11:00) (88/47 - 130/65)  BP(mean): 77 (12 Jun 2023 11:00) (48 - 88)  RR: 19 (12 Jun 2023 11:00) (15 - 34)  SpO2: 100% (12 Jun 2023 11:00) (91% - 100%)    Parameters below as of 12 Jun 2023 00:00  Patient On (Oxygen Delivery Method): room air        LABS:                        8.9    9.22  )-----------( 88       ( 12 Jun 2023 04:23 )             26.5     06-12    147<H>  |  117<H>  |  56<H>  ----------------------------<  107<H>  3.0<L>   |  20<L>  |  5.22<H>    Ca    9.3      12 Jun 2023 04:23  Phos  2.0     06-12  Mg     1.7     06-12    TPro  5.0<L>  /  Alb  2.0<L>  /  TBili  0.4  /  DBili  x   /  AST  16  /  ALT  7<L>  /  AlkPhos  81  06-12    PT/INR - ( 11 Jun 2023 16:10 )   PT: 20.6 sec;   INR: 1.72 ratio         PTT - ( 11 Jun 2023 16:10 )  PTT:51.0 sec          Culture - Sputum (collected 10 Carlin 2023 10:23)  Source: .Sputum Sputum  Gram Stain (11 Jun 2023 00:10):    Few polymorphonuclear leukocytes per low power field    Few Squamous epithelial cells per low power field    Rare Yeast like cells per oil power field    Few Gram Variable Rods per oil power field  Preliminary Report (11 Jun 2023 16:47):    Normal Respiratory Ivelisse present    Culture - Blood (collected 10 Carlin 2023 04:42)  Source: .Blood Blood-Peripheral  Preliminary Report (11 Jun 2023 10:01):    No growth to date.    Culture - Blood (collected 10 Carlin 2023 03:40)  Source: .Blood Blood-Peripheral  Preliminary Report (11 Jun 2023 10:01):    No growth to date.      SARS-CoV-2: NotDetec (10 Carlin 2023 10:35)  COVID-19 PCR: NotDetec (07 Jun 2023 06:43)  COVID-19 PCR: NotDetec (04 Jun 2023 18:40)  SARS-CoV-2: NotDetec (28 May 2023 21:02)  COVID-19 PCR: NotDetec (15 Dec 2022 00:41)             Patient is a 63y old  Male who presents with a chief complaint of sepsis       SUBJECTIVE / OVERNIGHT EVENTS: events noted. Pt c/o N/V      MEDICATIONS  (STANDING):  albuterol    90 MICROgram(s) HFA Inhaler 2 Puff(s) Inhalation every 6 hours  buPROPion XL (24-Hour) . 150 milliGRAM(s) Oral daily  chlorhexidine 2% Cloths 1 Application(s) Topical <User Schedule>  dextrose 5%. 1000 milliLiter(s) (50 mL/Hr) IV Continuous <Continuous>  divalproex  milliGRAM(s) Oral two times a day  dorzolamide 2% Ophthalmic Solution 1 Drop(s) Right EYE <User Schedule>  finasteride 5 milliGRAM(s) Oral daily  norepinephrine Infusion 0.05 MICROgram(s)/kG/Min (3.95 mL/Hr) IV Continuous <Continuous>  pantoprazole  Injectable 40 milliGRAM(s) IV Push every 24 hours  potassium chloride  20 mEq/100 mL IVPB 20 milliEquivalent(s) IV Intermittent every 2 hours  sodium bicarbonate 1300 milliGRAM(s) Oral three times a day  sucralfate suspension 1 Gram(s) Oral four times a day  tamsulosin 0.4 milliGRAM(s) Oral at bedtime  traZODone 50 milliGRAM(s) Oral at bedtime  vancomycin  IVPB 1500 milliGRAM(s) IV Intermittent once  vitamin A &amp; D Ointment 1 Application(s) Topical at bedtime    MEDICATIONS  (PRN):  ondansetron Injectable 4 milliGRAM(s) IV Push every 8 hours PRN Nausea and/or Vomiting  sodium chloride 0.9% lock flush 10 milliLiter(s) IV Push every 1 hour PRN Pre/post blood products, medications, blood draw, and to maintain line patency    CAPILLARY BLOOD GLUCOSE        I&O's Summary    11 Jun 2023 07:01  -  12 Jun 2023 07:00  --------------------------------------------------------  IN: 784.3 mL / OUT: 1575 mL / NET: -790.7 mL    12 Jun 2023 07:01  -  12 Jun 2023 11:29  --------------------------------------------------------  IN: 506.3 mL / OUT: 175 mL / NET: 331.3 mL        PHYSICAL EXAM:  Vital Signs Last 24 Hrs  T(C): 36.4 (12 Jun 2023 08:00), Max: 36.6 (11 Jun 2023 20:00)  T(F): 97.6 (12 Jun 2023 08:00), Max: 97.8 (11 Jun 2023 20:00)  HR: 56 (12 Jun 2023 11:00) (53 - 126)  BP: 106/67 (12 Jun 2023 11:00) (88/47 - 130/65)  BP(mean): 77 (12 Jun 2023 11:00) (48 - 88)  RR: 19 (12 Jun 2023 11:00) (15 - 34)  SpO2: 100% (12 Jun 2023 11:00) (91% - 100%)    Parameters below as of 12 Jun 2023 00:00  Patient On (Oxygen Delivery Method): room air    GEN: NAD; A and O x 2, ill-appearing, pallor  LUNGS: scattered rhonchi  HEART: S1 S2  ABDOMEN: soft, non-tender, non-distended, + BS  EXTREMITIES: B/LE edema        LABS:                        8.9    9.22  )-----------( 88       ( 12 Jun 2023 04:23 )             26.5     06-12    147<H>  |  117<H>  |  56<H>  ----------------------------<  107<H>  3.0<L>   |  20<L>  |  5.22<H>    Ca    9.3      12 Jun 2023 04:23  Phos  2.0     06-12  Mg     1.7     06-12    TPro  5.0<L>  /  Alb  2.0<L>  /  TBili  0.4  /  DBili  x   /  AST  16  /  ALT  7<L>  /  AlkPhos  81  06-12    PT/INR - ( 11 Jun 2023 16:10 )   PT: 20.6 sec;   INR: 1.72 ratio         PTT - ( 11 Jun 2023 16:10 )  PTT:51.0 sec          Culture - Sputum (collected 10 Carlin 2023 10:23)  Source: .Sputum Sputum  Gram Stain (11 Jun 2023 00:10):    Few polymorphonuclear leukocytes per low power field    Few Squamous epithelial cells per low power field    Rare Yeast like cells per oil power field    Few Gram Variable Rods per oil power field  Preliminary Report (11 Jun 2023 16:47):    Normal Respiratory Ivelises present    Culture - Blood (collected 10 Carlin 2023 04:42)  Source: .Blood Blood-Peripheral  Preliminary Report (11 Jun 2023 10:01):    No growth to date.    Culture - Blood (collected 10 Carlin 2023 03:40)  Source: .Blood Blood-Peripheral  Preliminary Report (11 Jun 2023 10:01):    No growth to date.      SARS-CoV-2: NotDetec (10 Carlin 2023 10:35)  COVID-19 PCR: NotDetec (07 Jun 2023 06:43)  COVID-19 PCR: NotDetec (04 Jun 2023 18:40)  SARS-CoV-2: NotDetec (28 May 2023 21:02)  COVID-19 PCR: NotDetec (15 Dec 2022 00:41)

## 2023-06-12 NOTE — CONSULT NOTE ADULT - ASSESSMENT
Pt is a 62 y/o M from [facility] 3 at baseline with mhx of CKD stage 4, chronic FC, decubitus ulcers, bipolar disorder who was originally admitted for sepsis 2/2 UTI and esophagitis, s/p EGD (5/30) which showed ulcerative esophagitis. Hospital course complicated by worsening SHEYLA on CKD and hypotension for which he was started on midodrine. Hospital course further complicated by hypoxia and new RLL infiltrate, readmitted to ICU for septic shock and AHRF 2/2 hospital acquired PNA. Also with new drop in H/H requiring transfusion of PRBC.

## 2023-06-12 NOTE — PROGRESS NOTE ADULT - SUBJECTIVE AND OBJECTIVE BOX
GAYLA PEARCE  63y  Patient is a 63y old  Male who presents with a chief complaint of sepsis (12 Jun 2023 17:31)    HPI:  Followed for SHEYLA on CKD.  Transferred to ICU again, on low dose vasopressors.      HEALTH ISSUES - PROBLEM Dx:  GIB (gastrointestinal bleeding)    Sepsis    Anemia    Hypothyroid    CKD (chronic kidney disease)    BPH (benign prostatic hyperplasia)    Bipolar disorder    Advance care planning    Seizure    Esophagitis    Hypokalemia    Ambulatory dysfunction    Prophylactic measure    Electrolyte depletion    Acute encephalopathy    Acute kidney injury superimposed on CKD    Ulcerative esophagitis    Decubitus ulcers    Functional quadriplegia    Moderate protein-calorie malnutrition    Encounter for palliative care    Chronic heart failure with preserved ejection fraction (HFpEF)    Sepsis with acute hypoxic respiratory failure    Protein calorie malnutrition    Palliative care encounter          MEDICATIONS  (STANDING):  albuterol    90 MICROgram(s) HFA Inhaler 2 Puff(s) Inhalation every 6 hours  buPROPion XL (24-Hour) . 150 milliGRAM(s) Oral daily  chlorhexidine 2% Cloths 1 Application(s) Topical <User Schedule>  dorzolamide 2% Ophthalmic Solution 1 Drop(s) Right EYE <User Schedule>  finasteride 5 milliGRAM(s) Oral daily  norepinephrine Infusion 0.05 MICROgram(s)/kG/Min (3.95 mL/Hr) IV Continuous <Continuous>  nystatin Powder 1 Application(s) Topical two times a day  pantoprazole  Injectable 40 milliGRAM(s) IV Push every 24 hours  sodium bicarbonate 1300 milliGRAM(s) Oral three times a day  sucralfate suspension 1 Gram(s) Oral four times a day  tamsulosin 0.4 milliGRAM(s) Oral at bedtime  traZODone 50 milliGRAM(s) Oral at bedtime  valproate sodium  IVPB 500 milliGRAM(s) IV Intermittent two times a day  vitamin A &amp; D Ointment 1 Application(s) Topical at bedtime    MEDICATIONS  (PRN):  ondansetron Injectable 4 milliGRAM(s) IV Push every 8 hours PRN Nausea and/or Vomiting  sodium chloride 0.9% lock flush 10 milliLiter(s) IV Push every 1 hour PRN Pre/post blood products, medications, blood draw, and to maintain line patency    Vital Signs Last 24 Hrs  T(C): 36.5 (12 Jun 2023 17:01), Max: 36.6 (11 Jun 2023 20:00)  T(F): 97.7 (12 Jun 2023 17:01), Max: 97.8 (11 Jun 2023 20:00)  HR: 88 (12 Jun 2023 18:45) (53 - 109)  BP: 108/58 (12 Jun 2023 18:45) (88/47 - 130/65)  BP(mean): 69 (12 Jun 2023 18:45) (48 - 105)  RR: 34 (12 Jun 2023 18:45) (15 - 34)  SpO2: 100% (12 Jun 2023 18:45) (92% - 100%)    Parameters below as of 12 Jun 2023 00:00  Patient On (Oxygen Delivery Method): room air      Daily     Daily     PHYSICAL EXAM:  Constitutional:  He appears comfortable and not distressed. Not diaphoretic.    Neck:  The thyroid is normal. Trachea is midline.     Respiratory: The lungs are clear to auscultation. No dullness and expansion is normal.    Cardiovascular: S1 and S2 are normal. No mummurs, rubs or gallops are present.    Gastrointestinal: The abdomen is soft. No tenderness is present. No masses are present. Bowel sounds are normal.    Genitourinary: The bladder is not distended. No CVA tenderness is present.    Extremities: No edema is noted. No deformities are present.    Neurological: Tone, power and sensation are normal.     Skin: No lesions are seen  or palpated.    Lymph Nodes: No lymphadenopathy is present.                                9.3    8.76  )-----------( 72       ( 12 Jun 2023 18:10 )             28.1     06-12    144  |  116<H>  |  53<H>  ----------------------------<  175<H>  4.1   |  19<L>  |  4.94<H>    Ca    8.5      12 Jun 2023 13:19  Phos  2.8     06-12  Mg     1.5     06-12    TPro  4.7<L>  /  Alb  1.8<L>  /  TBili  0.3  /  DBili  x   /  AST  16  /  ALT  7<L>  /  AlkPhos  81  06-12

## 2023-06-12 NOTE — PROGRESS NOTE ADULT - ASSESSMENT
Patient is a 63M with a PMHx of chronic daley, obesity, HTN, HLD, PVD, seizures, CKD stage IV, anemia, BPH, lymphedema, hypothyroidism, HFpEF, ? COPD, decubitus ulcer, polyneuropathy, polyarthritis, bipolar disorder, who presented to the ED with abd pain, n/v, and brown emesis. GI was consulted for GIB.     #Nausea  #Esophagitis  #GIB  #Anemia  #Brown emesis  Patient presented with brown emesis and food intolerance x 4 days, currently in the ICU for urosepsis.   GI was initially consulted on 5/30, s/p EGD on the same day, notable for ulcerative esophagitis, recommended PO Protonix 40mg BID x 2 weeks then daily as well as carafate 10cc four times daily x 2 weeks, and diet as tolerated. GI was re-engaged on 6/12 for anemia and ? repeat EGD. At bedside, no overt signs of bleeding, denies hematochezia, melena, hematemesis. Hgb today 8.9 (stable, baseline). Staff reported emesis x 2 this morning of large amount, brown colored, however denies coffee-ground output. Of note, RRT on 6/9 for hypotension, suspected GIB due to drop in Hgb 5.3 -> 6.8 -> 9.7 after 2u PRBC.     	- Tentative repeat EGD today 6/12  	- Keep NPO  	- Medical optimization per primary team  	- Continue IV Protonix 40mg BID x 2 weeks  	- Continue PO carafate 10cc four times daily x 2 weeks  	- Maintain active T&S, 2 large bore peripheral IVs, transfuse for goal hgb >7 or if symptomatic  	- Trend H/H   	- PRN IV Zofran 4-8mg q8h for nausea (prev on Reglan with minimal effect), monitor QTc    This note and its recommendations herein are preliminary until such time as cosigned by an attending.

## 2023-06-12 NOTE — CONSULT NOTE ADULT - PROBLEM/RECOMMENDATION-1
PATIENT MOVED TO ROOM 221A WITH RN DICK

ALL PERSONAL BELONGINGS TAKEN WITH PATIENT. PATIENT PLACED ON TELE #[id number]

LASIX AND DOPAMINE DRIP VERIFIED WITH RN DICK

PT STABLE AT TIME OF TRANSPORTATION
DISPLAY PLAN FREE TEXT
DISPLAY PLAN FREE TEXT

## 2023-06-12 NOTE — PROGRESS NOTE ADULT - SUBJECTIVE AND OBJECTIVE BOX
INTERVAL HPI/OVERNIGHT EVENTS:       PRESSORS: [ ] YES [ ] NO  WHICH:    ANTIBIOTICS:                  DATE STARTED:  ANTIBIOTICS:                  DATE STARTED:    Antimicrobial:  cefepime   IVPB 1000 milliGRAM(s) IV Intermittent every 12 hours  vancomycin  IVPB 1250 milliGRAM(s) IV Intermittent once    Cardiovascular:  norepinephrine Infusion 0.05 MICROgram(s)/kG/Min IV Continuous <Continuous>    Pulmonary:  albuterol    90 MICROgram(s) HFA Inhaler 2 Puff(s) Inhalation every 6 hours    Hematalogic:    Other:  buPROPion XL (24-Hour) . 150 milliGRAM(s) Oral daily  chlorhexidine 2% Cloths 1 Application(s) Topical <User Schedule>  dextrose 5%. 1000 milliLiter(s) IV Continuous <Continuous>  divalproex  milliGRAM(s) Oral two times a day  dorzolamide 2% Ophthalmic Solution 1 Drop(s) Right EYE <User Schedule>  finasteride 5 milliGRAM(s) Oral daily  haloperidol    Injectable 1 milliGRAM(s) IntraMuscular every 6 hours PRN  ondansetron Injectable 4 milliGRAM(s) IV Push every 8 hours PRN  pantoprazole  Injectable 40 milliGRAM(s) IV Push every 24 hours  potassium chloride  20 mEq/100 mL IVPB 20 milliEquivalent(s) IV Intermittent every 2 hours  sodium bicarbonate 1300 milliGRAM(s) Oral three times a day  sodium chloride 0.9% lock flush 10 milliLiter(s) IV Push every 1 hour PRN  sucralfate suspension 1 Gram(s) Oral four times a day  tamsulosin 0.4 milliGRAM(s) Oral at bedtime  traZODone 50 milliGRAM(s) Oral at bedtime  vitamin A &amp; D Ointment 1 Application(s) Topical at bedtime    albuterol    90 MICROgram(s) HFA Inhaler 2 Puff(s) Inhalation every 6 hours  buPROPion XL (24-Hour) . 150 milliGRAM(s) Oral daily  cefepime   IVPB 1000 milliGRAM(s) IV Intermittent every 12 hours  chlorhexidine 2% Cloths 1 Application(s) Topical <User Schedule>  dextrose 5%. 1000 milliLiter(s) IV Continuous <Continuous>  divalproex  milliGRAM(s) Oral two times a day  dorzolamide 2% Ophthalmic Solution 1 Drop(s) Right EYE <User Schedule>  finasteride 5 milliGRAM(s) Oral daily  haloperidol    Injectable 1 milliGRAM(s) IntraMuscular every 6 hours PRN  norepinephrine Infusion 0.05 MICROgram(s)/kG/Min IV Continuous <Continuous>  ondansetron Injectable 4 milliGRAM(s) IV Push every 8 hours PRN  pantoprazole  Injectable 40 milliGRAM(s) IV Push every 24 hours  potassium chloride  20 mEq/100 mL IVPB 20 milliEquivalent(s) IV Intermittent every 2 hours  sodium bicarbonate 1300 milliGRAM(s) Oral three times a day  sodium chloride 0.9% lock flush 10 milliLiter(s) IV Push every 1 hour PRN  sucralfate suspension 1 Gram(s) Oral four times a day  tamsulosin 0.4 milliGRAM(s) Oral at bedtime  traZODone 50 milliGRAM(s) Oral at bedtime  vancomycin  IVPB 1250 milliGRAM(s) IV Intermittent once  vitamin A &amp; D Ointment 1 Application(s) Topical at bedtime    Drug Dosing Weight  Height (cm): 172.7 (09 Jun 2023 21:00)  Weight (kg): 84.2 (09 Jun 2023 21:00)  BMI (kg/m2): 28.2 (09 Jun 2023 21:00)  BSA (m2): 1.98 (09 Jun 2023 21:00)    PHYSICAL EXAM:  GENERAL: NAD  EYES: EOMI, PERRLA  NECK: Supple, No JVD; Normal thyroid; Trachea midline: No LAD   NERVOUS SYSTEM:  Alert & Oriented X3,  Motor Strength 5/5 B/L upper and lower extremities; DTRs 2+ intact and symmetric  CHEST/LUNG: No rales, rhonchi, wheezing, breath sounds present bilaterally  HEART: Regular rate and rhythm; No murmurs, no gallops  ABDOMEN: Soft, Nontender, Nondistended; Bowel sounds present, no pain or masses on palpation  : voiding well, [place] in place  EXTREMITIES:  2+ Peripheral Pulses, No clubbing, cyanosis, or edema  SKIN: warm, intact, no lesions     LINES/DRAINS/DEVICES  CENTRAL LINE: [ ] YES [ ] NO  LOCATION:     HATFIELD: [ ] YES [ ] NO     A-LINE:  [ ] YES [ ] NO  LOCATION:       ICU Vital Signs Last 24 Hrs  T(C): 36.4 (12 Jun 2023 08:00), Max: 36.6 (11 Jun 2023 20:00)  T(F): 97.6 (12 Jun 2023 08:00), Max: 97.8 (11 Jun 2023 20:00)  HR: 82 (12 Jun 2023 10:00) (56 - 126)  BP: 113/57 (12 Jun 2023 10:00) (88/47 - 130/65)  BP(mean): 70 (12 Jun 2023 10:00) (48 - 88)  ABP: --  ABP(mean): --  RR: 16 (12 Jun 2023 10:00) (15 - 34)  SpO2: 98% (12 Jun 2023 10:00) (91% - 100%)    O2 Parameters below as of 12 Jun 2023 00:00  Patient On (Oxygen Delivery Method): room air                  06-11 @ 07:01  -  06-12 @ 07:00  --------------------------------------------------------  IN: 784.3 mL / OUT: 1575 mL / NET: -790.7 mL              LABS:  CBC Full  -  ( 12 Jun 2023 04:23 )  WBC Count : 9.22 K/uL  RBC Count : 3.03 M/uL  Hemoglobin : 8.9 g/dL  Hematocrit : 26.5 %  Platelet Count - Automated : 88 K/uL  Mean Cell Volume : 87.5 fl  Mean Cell Hemoglobin : 29.4 pg  Mean Cell Hemoglobin Concentration : 33.6 gm/dL  Auto Neutrophil # : 6.67 K/uL  Auto Lymphocyte # : 1.02 K/uL  Auto Monocyte # : 1.00 K/uL  Auto Eosinophil # : 0.16 K/uL  Auto Basophil # : 0.03 K/uL  Auto Neutrophil % : 72.4 %  Auto Lymphocyte % : 11.1 %  Auto Monocyte % : 10.8 %  Auto Eosinophil % : 1.7 %  Auto Basophil % : 0.3 %    06-12    147<H>  |  117<H>  |  56<H>  ----------------------------<  107<H>  3.0<L>   |  20<L>  |  5.22<H>    Ca    9.3      12 Jun 2023 04:23  Phos  2.0     06-12  Mg     1.7     06-12    TPro  5.0<L>  /  Alb  2.0<L>  /  TBili  0.4  /  DBili  x   /  AST  16  /  ALT  7<L>  /  AlkPhos  81  06-12    PT/INR - ( 11 Jun 2023 16:10 )   PT: 20.6 sec;   INR: 1.72 ratio         PTT - ( 11 Jun 2023 16:10 )  PTT:51.0 sec    Culture Results:   Normal Respiratory Ivelisse present (06-10 @ 10:23)  Culture Results:   No growth to date. (06-10 @ 04:42)  Culture Results:   No growth to date. (06-10 @ 03:40)      RADIOLOGY & ADDITIONAL STUDIES REVIEWED DURING TEAM ROUNDS    [ ]GOALS OF CARE DISCUSSION WITH PATIENT/FAMILY/PROXY:    CRITICAL CARE TIME SPENT: 35 minutes   INTERVAL HPI/OVERNIGHT EVENTS:   - No acute events overnight    PRESSORS: [X] YES [ ] NO  Levophed    Antimicrobial:  cefepime   IVPB 1000 milliGRAM(s) IV Intermittent every 12 hours  vancomycin  IVPB 1250 milliGRAM(s) IV Intermittent once    Cardiovascular:  norepinephrine Infusion 0.05 MICROgram(s)/kG/Min IV Continuous <Continuous>    Pulmonary:  albuterol    90 MICROgram(s) HFA Inhaler 2 Puff(s) Inhalation every 6 hours    Hematalogic:    Other:  buPROPion XL (24-Hour) . 150 milliGRAM(s) Oral daily  chlorhexidine 2% Cloths 1 Application(s) Topical <User Schedule>  dextrose 5%. 1000 milliLiter(s) IV Continuous <Continuous>  divalproex  milliGRAM(s) Oral two times a day  dorzolamide 2% Ophthalmic Solution 1 Drop(s) Right EYE <User Schedule>  finasteride 5 milliGRAM(s) Oral daily  haloperidol    Injectable 1 milliGRAM(s) IntraMuscular every 6 hours PRN  ondansetron Injectable 4 milliGRAM(s) IV Push every 8 hours PRN  pantoprazole  Injectable 40 milliGRAM(s) IV Push every 24 hours  potassium chloride  20 mEq/100 mL IVPB 20 milliEquivalent(s) IV Intermittent every 2 hours  sodium bicarbonate 1300 milliGRAM(s) Oral three times a day  sodium chloride 0.9% lock flush 10 milliLiter(s) IV Push every 1 hour PRN  sucralfate suspension 1 Gram(s) Oral four times a day  tamsulosin 0.4 milliGRAM(s) Oral at bedtime  traZODone 50 milliGRAM(s) Oral at bedtime  vitamin A &amp; D Ointment 1 Application(s) Topical at bedtime    albuterol    90 MICROgram(s) HFA Inhaler 2 Puff(s) Inhalation every 6 hours  buPROPion XL (24-Hour) . 150 milliGRAM(s) Oral daily  cefepime   IVPB 1000 milliGRAM(s) IV Intermittent every 12 hours  chlorhexidine 2% Cloths 1 Application(s) Topical <User Schedule>  dextrose 5%. 1000 milliLiter(s) IV Continuous <Continuous>  divalproex  milliGRAM(s) Oral two times a day  dorzolamide 2% Ophthalmic Solution 1 Drop(s) Right EYE <User Schedule>  finasteride 5 milliGRAM(s) Oral daily  haloperidol    Injectable 1 milliGRAM(s) IntraMuscular every 6 hours PRN  norepinephrine Infusion 0.05 MICROgram(s)/kG/Min IV Continuous <Continuous>  ondansetron Injectable 4 milliGRAM(s) IV Push every 8 hours PRN  pantoprazole  Injectable 40 milliGRAM(s) IV Push every 24 hours  potassium chloride  20 mEq/100 mL IVPB 20 milliEquivalent(s) IV Intermittent every 2 hours  sodium bicarbonate 1300 milliGRAM(s) Oral three times a day  sodium chloride 0.9% lock flush 10 milliLiter(s) IV Push every 1 hour PRN  sucralfate suspension 1 Gram(s) Oral four times a day  tamsulosin 0.4 milliGRAM(s) Oral at bedtime  traZODone 50 milliGRAM(s) Oral at bedtime  vancomycin  IVPB 1250 milliGRAM(s) IV Intermittent once  vitamin A &amp; D Ointment 1 Application(s) Topical at bedtime    Drug Dosing Weight  Height (cm): 172.7 (09 Jun 2023 21:00)  Weight (kg): 84.2 (09 Jun 2023 21:00)  BMI (kg/m2): 28.2 (09 Jun 2023 21:00)  BSA (m2): 1.98 (09 Jun 2023 21:00)    PHYSICAL EXAM:  GENERAL: Chronically ill appearing, obese, supine in bed. NAD.   HEAD: Atraumatic, Normocephalic  EYES: EOMI, PERRL, conjunctiva and sclera clear  ENMT: No oropharyngeal exudates, erythematous oral mucosa. Moist mucous membranes. Denies painful swallowing. No oral bleeding.   NECK: Supple, no cervical lymphadenopathy, no JVD.   NERVOUS SYSTEM: Alert & Oriented x2, intermittent confusion. Moves all extremities on command. No focal neuro deficits.    CHEST/LUNG: Lung sounds clear bilaterally to auscultation. POCUS: Bilateral A-lines with B-lines to bases.   HEART: S1/S2 without murmurs, without rubs, or gallops.   ABDOMEN: Soft, Nontender, distended; Bowel sounds present, Bladder non distended, non palpable  EXTREMITIES: Pulses palpable radial pulses 2+ bilat, without cyanosis. Digits warm to touch with good cap refill < 3 secs  SKIN: warm, dry, intact, normal color, scattered ecchymosis to abdomen. Sacral pressure ulcer.     LINES/DRAINS/DEVICES  CENTRAL LINE: [X] YES [ ] NO  LOCATION: Left IJ     HATFIELD: [X] YES [ ] NO     A-LINE:  [ ] YES [X] NO      ICU Vital Signs Last 24 Hrs  T(C): 36.4 (12 Jun 2023 08:00), Max: 36.6 (11 Jun 2023 20:00)  T(F): 97.6 (12 Jun 2023 08:00), Max: 97.8 (11 Jun 2023 20:00)  HR: 82 (12 Jun 2023 10:00) (56 - 126)  BP: 113/57 (12 Jun 2023 10:00) (88/47 - 130/65)  BP(mean): 70 (12 Jun 2023 10:00) (48 - 88)  ABP: --  ABP(mean): --  RR: 16 (12 Jun 2023 10:00) (15 - 34)  SpO2: 98% (12 Jun 2023 10:00) (91% - 100%)    O2 Parameters below as of 12 Jun 2023 00:00  Patient On (Oxygen Delivery Method): room air      06-11 @ 07:01  -  06-12 @ 07:00  --------------------------------------------------------  IN: 784.3 mL / OUT: 1575 mL / NET: -790.7 mL      LABS:  CBC Full  -  ( 12 Jun 2023 04:23 )  WBC Count : 9.22 K/uL  RBC Count : 3.03 M/uL  Hemoglobin : 8.9 g/dL  Hematocrit : 26.5 %  Platelet Count - Automated : 88 K/uL  Mean Cell Volume : 87.5 fl  Mean Cell Hemoglobin : 29.4 pg  Mean Cell Hemoglobin Concentration : 33.6 gm/dL  Auto Neutrophil # : 6.67 K/uL  Auto Lymphocyte # : 1.02 K/uL  Auto Monocyte # : 1.00 K/uL  Auto Eosinophil # : 0.16 K/uL  Auto Basophil # : 0.03 K/uL  Auto Neutrophil % : 72.4 %  Auto Lymphocyte % : 11.1 %  Auto Monocyte % : 10.8 %  Auto Eosinophil % : 1.7 %  Auto Basophil % : 0.3 %    06-12    147<H>  |  117<H>  |  56<H>  ----------------------------<  107<H>  3.0<L>   |  20<L>  |  5.22<H>    Ca    9.3      12 Jun 2023 04:23  Phos  2.0     06-12  Mg     1.7     06-12    TPro  5.0<L>  /  Alb  2.0<L>  /  TBili  0.4  /  DBili  x   /  AST  16  /  ALT  7<L>  /  AlkPhos  81  06-12    PT/INR - ( 11 Jun 2023 16:10 )   PT: 20.6 sec;   INR: 1.72 ratio         PTT - ( 11 Jun 2023 16:10 )  PTT:51.0 sec    Culture Results:   Normal Respiratory Ivelisse present (06-10 @ 10:23)  Culture Results:   No growth to date. (06-10 @ 04:42)  Culture Results:   No growth to date. (06-10 @ 03:40)      RADIOLOGY & ADDITIONAL STUDIES REVIEWED DURING TEAM ROUNDS

## 2023-06-12 NOTE — PROGRESS NOTE ADULT - ASSESSMENT
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 H2 blocker)  -monitor CBC daily  -always check post SDP transfusion plt level within 1 hour to confirm adequate response    Thank you for the referral. Will continue to monitor the patient.  Please call with any questions [phone]  Above reviewed with Attending Dr. Derrell POON/[facility]  [id number] [facility], [address], [place], [place]  [phone]  *Note not finalized until signed by Attending Physician

## 2023-06-12 NOTE — CONSULT NOTE ADULT - PROBLEM SELECTOR RECOMMENDATION 7
Given pt lacking capacity, the plan was to have family discussion with pt's Brother Mr. Germán Rhoades in regards of GOC and ACP. However, pt's brother did not come to the hospital today.   Will plan to have family discussion tomorrow  Will continue to follow   Total encounter time 25-30 min
Presumably due to history of polyneuropathy and polyarthritis, possibly aggravated by behavioral issues related to chronic bipolar disorder.  Patient is bedbound at baseline, already with multiple sites of stage I/II skin breakdown, per chart not participating with PT.  Patient at risk of further debility and functional decline.    Continue present wound care recommendations  Offloading/frequent turning and repositioning as above

## 2023-06-12 NOTE — CONSULT NOTE ADULT - CONSULT REQUESTED BY NAME
Dr. Morin
DR. STORY
Dr Banerjee
Dr. Cohen
Dr. Davalos
Dr Phillips
Dr. Barcenas
Dr Cohen
Gunjan Cabrales MD

## 2023-06-13 NOTE — PROGRESS NOTE ADULT - PSYCHIATRIC
negative
normal/normal affect/alert and oriented x3/normal behavior

## 2023-06-13 NOTE — PHYSICAL THERAPY INITIAL EVALUATION ADULT - CRITERIA FOR SKILLED THERAPEUTIC INTERVENTIONS
impairments found/functional limitations in following categories/risk reduction/prevention/rehab potential
impairments found/functional limitations in following categories/risk reduction/prevention/rehab potential

## 2023-06-13 NOTE — PHYSICAL THERAPY INITIAL EVALUATION ADULT - PATIENT/FAMILY/SIGNIFICANT OTHER GOALS STATEMENT, PT EVAL
Patient stated that he wants to get better
Patient stated that he still feels like throwing up and has pain on R LE

## 2023-06-13 NOTE — PROGRESS NOTE ADULT - SUBJECTIVE AND OBJECTIVE BOX
follow up on:  complex medical decision making related to goals of care    OVERNIGHT EVENTS: no events overnight    Present Symptoms:   Dyspnea: denies  Nausea/Vomiting: severe  Anxiety:  unassessed  Depressed Mood: unassessed  Fatigue: denies  Loss of appetite: denies  Pain: denies  Review of Systems: [All others negative     MEDICATIONS  (STANDING):  albuterol    90 MICROgram(s) HFA Inhaler 2 Puff(s) Inhalation every 6 hours  chlorhexidine 2% Cloths 1 Application(s) Topical <User Schedule>  dorzolamide 2% Ophthalmic Solution 1 Drop(s) Right EYE <User Schedule>  metoclopramide Injectable 10 milliGRAM(s) IV Push every 6 hours  norepinephrine Infusion 0.05 MICROgram(s)/kG/Min (3.95 mL/Hr) IV Continuous <Continuous>  nystatin Powder 1 Application(s) Topical two times a day  pantoprazole  Injectable 40 milliGRAM(s) IV Push every 24 hours  sucralfate suspension 1 Gram(s) Oral four times a day  valproate sodium  IVPB 500 milliGRAM(s) IV Intermittent two times a day  vitamin A &amp; D Ointment 1 Application(s) Topical at bedtime    MEDICATIONS  (PRN):  ondansetron Injectable 4 milliGRAM(s) IV Push every 8 hours PRN Nausea and/or Vomiting  sodium chloride 0.9% lock flush 10 milliLiter(s) IV Push every 1 hour PRN Pre/post blood products, medications, blood draw, and to maintain line patency      PHYSICAL EXAM:  Vital Signs Last 24 Hrs  T(C): 36.3 (13 Jun 2023 15:32), Max: 36.5 (12 Jun 2023 17:01)  T(F): 97.3 (13 Jun 2023 15:32), Max: 97.7 (12 Jun 2023 17:01)  HR: 112 (13 Jun 2023 14:30) (57 - 127)  BP: 110/66 (13 Jun 2023 14:30) (84/50 - 123/75)  BP(mean): 79 (13 Jun 2023 14:30) (58 - 105)  RR: 26 (13 Jun 2023 14:30) (15 - 34)  SpO2: 96% (13 Jun 2023 14:30) (90% - 100%)    Parameters below as of 13 Jun 2023 07:30  Patient On (Oxygen Delivery Method): room air        General: critically ill appearing man, AOx2.  NAD  Karnofsky Performance Score/Palliative Performance Status Version2: 30    %    HEENT: NCAT, dry mouth, neck supple  Lungs: unlabored   CV: RRR, S1S2, tachycardia  GI: soft non distended non tender  incontinent  : incontinent / daley  Musculoskeletal: weakness, bedbound, b/l LE edema  Skin: no skin lesions, poor skin turgor,   Neuro: able to follow simple commands  Oral intake ability: unable/only mouth care y    LABS:                          9.0    7.71  )-----------( 81       ( 13 Jun 2023 03:10 )             27.0     06-13    147<H>  |  118<H>  |  50<H>  ----------------------------<  90  4.1   |  19<L>  |  4.82<H>    Ca    9.4      13 Jun 2023 14:40  Phos  3.1     06-13  Mg     2.1     06-13    TPro  4.7<L>  /  Alb  1.8<L>  /  TBili  0.3  /  DBili  x   /  AST  16  /  ALT  7<L>  /  AlkPhos  81  06-12        RADIOLOGY & ADDITIONAL STUDIES:    ADVANCE DIRECTIVES:   follow up on:  complex medical decision making related to goals of care    OVERNIGHT EVENTS: no events overnight    Present Symptoms:   Dyspnea: denies  Nausea/Vomiting: severe  Anxiety:  unassessed  Depressed Mood: unassessed  Fatigue: denies  Loss of appetite: denies  Pain: denies  Review of Systems: [All others negative     MEDICATIONS  (STANDING):  albuterol    90 MICROgram(s) HFA Inhaler 2 Puff(s) Inhalation every 6 hours  chlorhexidine 2% Cloths 1 Application(s) Topical <User Schedule>  dorzolamide 2% Ophthalmic Solution 1 Drop(s) Right EYE <User Schedule>  metoclopramide Injectable 10 milliGRAM(s) IV Push every 6 hours  norepinephrine Infusion 0.05 MICROgram(s)/kG/Min (3.95 mL/Hr) IV Continuous <Continuous>  nystatin Powder 1 Application(s) Topical two times a day  pantoprazole  Injectable 40 milliGRAM(s) IV Push every 24 hours  sucralfate suspension 1 Gram(s) Oral four times a day  valproate sodium  IVPB 500 milliGRAM(s) IV Intermittent two times a day  vitamin A &amp; D Ointment 1 Application(s) Topical at bedtime    MEDICATIONS  (PRN):  ondansetron Injectable 4 milliGRAM(s) IV Push every 8 hours PRN Nausea and/or Vomiting  sodium chloride 0.9% lock flush 10 milliLiter(s) IV Push every 1 hour PRN Pre/post blood products, medications, blood draw, and to maintain line patency      PHYSICAL EXAM:  Vital Signs Last 24 Hrs  T(C): 36.3 (13 Jun 2023 15:32), Max: 36.5 (12 Jun 2023 17:01)  T(F): 97.3 (13 Jun 2023 15:32), Max: 97.7 (12 Jun 2023 17:01)  HR: 112 (13 Jun 2023 14:30) (57 - 127)  BP: 110/66 (13 Jun 2023 14:30) (84/50 - 123/75)  BP(mean): 79 (13 Jun 2023 14:30) (58 - 105)  RR: 26 (13 Jun 2023 14:30) (15 - 34)  SpO2: 96% (13 Jun 2023 14:30) (90% - 100%)    Parameters below as of 13 Jun 2023 07:30  Patient On (Oxygen Delivery Method): room air        General: critically ill appearing man, AOx2.  NAD  Karnofsky Performance Score/Palliative Performance Status Version2: 30    %    HEENT: NCAT, dry mouth, neck supple  Lungs: unlabored   CV: RRR, S1S2, tachycardia  GI: soft non distended non tender  incontinent  : incontinent / daley  Musculoskeletal: weakness, bedbound, b/l LE edema  Skin: b/l LE hyperpigmentation, poor skin turgor,   Neuro: able to follow simple commands  Oral intake ability: unable/only mouth care     LABS:                          9.0    7.71  )-----------( 81       ( 13 Jun 2023 03:10 )             27.0     06-13    147<H>  |  118<H>  |  50<H>  ----------------------------<  90  4.1   |  19<L>  |  4.82<H>    Ca    9.4      13 Jun 2023 14:40  Phos  3.1     06-13  Mg     2.1     06-13    TPro  4.7<L>  /  Alb  1.8<L>  /  TBili  0.3  /  DBili  x   /  AST  16  /  ALT  7<L>  /  AlkPhos  81  06-12        RADIOLOGY & ADDITIONAL STUDIES: reviewed    ADVANCE DIRECTIVES: FULL CODE

## 2023-06-13 NOTE — PROGRESS NOTE ADULT - SUBJECTIVE AND OBJECTIVE BOX
GI Progress Note    Patient is a 63y old  Male who presents with a chief complaint of sepsis (12 Jun 2023 23:44)    GI was consulted for esophagitis.  GI ws re-consulted for anemia and repeat endoscopic evaluation.     24-HOUR INTERVAL EVENTS: Patient resting in bed, still requiring pressor support, continues to endorse nausea with excessive salivation and dry heaving subsequently causing intermittent sob, unable to tolerate PO intake, otherwise denies abd pain/tenderness, no flatus or BM. s/p repeat EGD yesterday, no active bleeding noted. Discussed results, questions answered.     MEDICATIONS  (STANDING):  albuterol    90 MICROgram(s) HFA Inhaler 2 Puff(s) Inhalation every 6 hours  buPROPion XL (24-Hour) . 150 milliGRAM(s) Oral daily  chlorhexidine 2% Cloths 1 Application(s) Topical <User Schedule>  dorzolamide 2% Ophthalmic Solution 1 Drop(s) Right EYE <User Schedule>  finasteride 5 milliGRAM(s) Oral daily  norepinephrine Infusion 0.05 MICROgram(s)/kG/Min (3.95 mL/Hr) IV Continuous <Continuous>  nystatin Powder 1 Application(s) Topical two times a day  pantoprazole  Injectable 40 milliGRAM(s) IV Push every 24 hours  sodium bicarbonate 1300 milliGRAM(s) Oral three times a day  sucralfate suspension 1 Gram(s) Oral four times a day  tamsulosin 0.4 milliGRAM(s) Oral at bedtime  traZODone 50 milliGRAM(s) Oral at bedtime  valproate sodium  IVPB 500 milliGRAM(s) IV Intermittent two times a day  vitamin A &amp; D Ointment 1 Application(s) Topical at bedtime    MEDICATIONS  (PRN):  ondansetron Injectable 4 milliGRAM(s) IV Push every 8 hours PRN Nausea and/or Vomiting  sodium chloride 0.9% lock flush 10 milliLiter(s) IV Push every 1 hour PRN Pre/post blood products, medications, blood draw, and to maintain line patency    __________________________________________________  REVIEW OF SYSTEMS:  A detailed set of ROS were asked and negative except those outlined in GI HPI above/below.   ________________________________________________  PHYSICAL EXAM    Vital Signs Last 24 Hrs  T(C): 36 (13 Jun 2023 08:00), Max: 36.5 (12 Jun 2023 17:01)  T(F): 96.8 (13 Jun 2023 08:00), Max: 97.7 (12 Jun 2023 17:01)  HR: 115 (13 Jun 2023 09:00) (53 - 124)  BP: 104/64 (13 Jun 2023 09:00) (90/51 - 128/66)  BP(mean): 73 (13 Jun 2023 09:00) (51 - 105)  RR: 23 (13 Jun 2023 09:00) (15 - 34)  SpO2: 97% (13 Jun 2023 09:00) (90% - 100%)    Parameters below as of 13 Jun 2023 07:30  Patient On (Oxygen Delivery Method): room air        GEN: NAD  HEENT: EOMI, conjunctivae anicteric, neck supple, dry mucous membranes  PULM: LCTAB, no wheezing, rales, or rhonchi  CV: RRR, no m/r/g  GI: soft, NT, ND; +BS in all four quadrants, no ascites, no Reno's sign  MSK: rodolfo BENDER BLE  NEURO: A&O x 3, no gross deficits  _________________________________________________  LABS:                        9.0    7.71  )-----------( 81       ( 13 Jun 2023 03:10 )             27.0     06-13    144  |  116<H>  |  49<H>  ----------------------------<  93  3.7   |  19<L>  |  4.63<H>    Ca    8.9      13 Jun 2023 03:10  Phos  2.3     06-13  Mg     2.2     06-13    TPro  4.7<L>  /  Alb  1.8<L>  /  TBili  0.3  /  DBili  x   /  AST  16  /  ALT  7<L>  /  AlkPhos  81  06-12    PT/INR - ( 13 Jun 2023 03:10 )   PT: 18.1 sec;   INR: 1.51 ratio         PTT - ( 13 Jun 2023 03:10 )  PTT:44.5 sec    CAPILLARY BLOOD GLUCOSE      POCT Blood Glucose.: 88 mg/dL (13 Jun 2023 00:56)    SARS-CoV-2: NotDetec (10 Carlin 2023 10:35)  COVID-19 PCR: NotDetec (07 Jun 2023 06:43)  COVID-19 PCR: NotDetec (04 Jun 2023 18:40)  SARS-CoV-2: NotDetec (28 May 2023 21:02)      RADIOLOGY & ADDITIONAL TESTS:      EGD (5/30/23):  Findings:  Esophagus Mucosa Localized erythema, congestion and ulceration of the mucosa  with no bleeding was noted in the lower third of the esophagus. These findings  are compatible with ulcerative esophagitis.  Stomach Mucosa Normal mucosa was noted in the whole stomach.  Normal mucosa was noted in the cardia and fundus. On retroflexed view, the  stomach appeared to be normal except some diverticulum noted in fundus.  Duodenum Mucosa Normal mucosa was noted in the whole examined duodenum. Retained  food noted in 2nd portion.    Impressions:  Erythema, congestion and ulceration in the lower third of the esophagus  compatible with ulcerative esophagitis.  Normal mucosa in the whole stomach.  Normal mucosa in the cardia and fundus.  Normal mucosa in the whole examined duodenum.      EGD (6/12/23):  Findings:  Esophagus Additional esophagus findings A shallow diverticulum seentowards  distal esophagus. 95% of prior esophagitis completely healed. Otherwise  unremarkable..  Stomach Mucosa Localized Single inflammatory nodule noted in antrum. No biopsies  take due to elevated INR. Of the mucosa with no bleeding was noted in the  Antrum. These findings are compatible with Inflammatory Nodule.  Duodenum Mucosa Normal mucosa was noted in the whole examined duodenum.    Impressions:  A shallow diverticulum seen towards distal esophagus. 95% of prior esophagitis  completely healed. Otherwise unremarkable. .  Single inflammatory nodule noted in antrum. No biopsies take due to elevated  INR. In the Antrum compatible with Inflammatory Nodule.  Normal mucosa in the whole examined duodenum.

## 2023-06-13 NOTE — PROGRESS NOTE ADULT - NUTRITIONAL ASSESSMENT
This patient has been assessed with a concern for Malnutrition and has been determined to have a diagnosis/diagnoses of Moderate protein-calorie malnutrition.    This patient is being managed with:   Diet Consistent Carbohydrate Clear Liquid-  Entered: Jun 13 2023  4:44PM

## 2023-06-13 NOTE — PROGRESS NOTE ADULT - ASSESSMENT
63 year old Male, from [place], AOx3, ambulates with walker , chronic FC, h/o obesity, HTN, HLD, PVD, Seizures, CKD4 (base SCr 3.8), anemia, BPH, Lymphedema, hypothyroidism, HFpEF, possible COPD, decubitus ulcer (sacrum/gluteal), polyneuropathy, polyarthritis, bipolar disorder, presented to ED for abdominal pain, nausea and vomiting brown emesis. Admitted to ICU for Hypotension likely from Sepsis secondary to complicated UTI and esophagitis. Readmitted to ICU status post RRT for hypotension and concern for evolving septic vs hypovolemic shock.     Plan  ====Neuro====  #Baseline mentation: AAOx2   - lethargic, likely due to metabolic encephalopathy in setting of shock state and organ dysfunction    #Seizure  - Continue Valproic Acid IVPB 500mg BID  - no seizure activities, seizure precautions in place     #Bipolar disorder.   - cont. w/ Wellbutrin, Trazadone  - Psych following  - Continue Depakote    #Failure to thrive  - Psych and Palliative following    ====CVS====  #Sepsis 2/2 pseudomonas bacteremia and UTI   #Hemorrhagic shock 2/2 Ulcerative esophagitis  - was already on cefepime (last day 6/11 to complete a total of 14 days), d/c on 612  - on levophed @0.05 for BP support  - Given 1x bolus of 1L LR   - sputum culture 6/10 neg, Blood Cx 6/10 Neg and cleared from 6/1, UA 6/6 +  - ID following, f/u ID recs    ====Pulm====  #No acute issues  - cont with NC   - monitor O2 sat and titrate off as indicated  - repeat CXR today -> repeat CXR unchanged from prior  - +Staph on Nares PCR    ====GI====  #Diet: NPO  #Bowel regimen: none    #Ulcerative esophagitis  - cont. w/ PPI and Carafate  - s/p EGD 5/30/23 -> contact GI in AM 6/12 for possible repeat EGD given patient required 2units PRBCs on 6/10  - Hgb 6/12 stable @ 8.9, no signs of active bleeding or hematemesis   - GI following, appreciate recs  - Send FOBT with neg BM    #Persistent Nausea likely 2/2 to Gastroparesis  - Reglan 10mg q6h  - Zofran 4mg prn  - Consider placement of NGT for nutrition if nausea and vomiting persist    ====Renal====  #SHEYLA on CKD  #Hypokalemia likely 2/2 Distal RTA  - Cr plateaued 5.2 now 4.94   - monitor BMP and UOP  - no emergent need for HD at this time  - replete electrolytes prn, replete K if less than 3  - Maintain K>4, Mag >2, Ph >3  - nephro following  - bicarb gtt d/cd 6/11, trend venous pH    #BPH  - cont with finasteride and tamsulosin  - cont daley    ====ID====  #Sepsis 2/2 pseudomonas bacteremia and UTI   - was already on cefepime (last day 6/11 to complete a total of 14 days), d/c on 612  - sputum culture 6/10 neg, Blood Cx 6/10 Neg and cleared from 6/1, UA 6/6 +  - Cefepime d/c iso thrombocytopenia. No need fo abx therapy due to clinical improvement.   - ID following, f/u ID recs    ====Heme/Onc====  #thrombocytopenia likelt 2/2 cefepime  -HIT panel negative, XENIA negative  -fibrinogen low, PTT elevated, but d-dimer low; inconsistent with DIC  - PLT 88 from 103, no active bleeding  - follow CBC from 6/12  - keep PLT > 50  - heme/onc following : QMA    #anemia  -h/o suspected GIB, s/p EGD; negative for GIB (5/30), repeat EGD 6/12 with improving esophagitis and no signs of active bleeding  -s/p 2 units PRBCs with appropriate response  6.8> 9.7  -no evidence of hemolysis, hapto and bili WNL  -transfuse < 7    ====Endo====  #bG goal: 140-180  - ISS for poor oral intake    #Hypothyroidism   - continue synthroid 137mcg daily    ====Skin/lines====  #Sacral and right gluteal pressure ulcer   - Wound care as ordered   #peripheral: 2 peripherals in each arm  #CVC: [facility] (6/9)  #daley: yes    ====Ppx====  #DVT: hold given acute anemia  #GI: PPI   #Palliative care following    Emergency Contact: Brother Germán  63 year old Male, from [place], AOx3, ambulates with walker , chronic FC, h/o obesity, HTN, HLD, PVD, Seizures, CKD4 (base SCr 3.8), anemia, BPH, Lymphedema, hypothyroidism, HFpEF, possible COPD, decubitus ulcer (sacrum/gluteal), polyneuropathy, polyarthritis, bipolar disorder, presented to ED for abdominal pain, nausea and vomiting brown emesis. Admitted to ICU for Hypotension likely from Sepsis secondary to complicated UTI and esophagitis. Readmitted to ICU status post RRT for hypotension and concern for evolving septic vs hypovolemic shock.     Plan  ====Neuro====  #Baseline mentation: AAOx2   - lethargic, likely due to metabolic encephalopathy in setting of shock state and organ dysfunction    #Seizure  - Continue Valproic Acid IVPB 500mg BID  - no seizure activities, seizure precautions in place     #Bipolar disorder.   - Wellbutrin, Trazadone being held due to continued po intolerance  - Consult psych for IM regimen while not tolerating po  - Continue Depakote    #Failure to thrive  - Psych and Palliative following    ====CVS====  #Sepsis 2/2 pseudomonas bacteremia and UTI   #Hemorrhagic shock 2/2 Ulcerative esophagitis  - was already on cefepime (last day 6/11 to complete a total of 14 days), d/c on 612  - off levophed as of 6/13  - Given 1x bolus of 1L LR   - sputum culture 6/10 neg, Blood Cx 6/10 Neg and cleared from 6/1, UA 6/6 +  - ID following, f/u ID recs    ====Pulm====  #No acute issues  - cont with NC   - monitor O2 sat and titrate off as indicated  - repeat CXR today -> repeat CXR unchanged from prior  - +Staph on Nares PCR    ====GI====  #Diet: NPO  #Bowel regimen: none    #Ulcerative esophagitis  - cont. w/ PPI and Carafate  - s/p EGD 5/30/23 -> contact GI in AM 6/12 for possible repeat EGD given patient required 2units PRBCs on 6/10  - Hgb 6/12 stable @ 8.9, no signs of active bleeding or hematemesis   - GI following, appreciate recs  - Send FOBT with neg BM    #Persistent Nausea 2/2 ulcerative esophagitis vs. gastroparesis   - Reglan 10mg q6h  - Zofran 4mg prn  - Consider placement of NGT for nutrition if nausea and vomiting persist    ====Renal====  #SHEYLA on CKD  #Hypokalemia likely 2/2 Distal RTA  - Cr plateaued 5.2 now 4.94   - monitor BMP and UOP  - no emergent need for HD at this time  - replete electrolytes prn, replete K if less than 3  - Maintain K>4, Mag >2, Ph >3  - nephro following  - bicarb gtt d/cd 6/11, trend venous pH    #BPH  - cont with finasteride and tamsulosin  - cont daley    ====ID====  #Sepsis 2/2 pseudomonas bacteremia and UTI   - was already on cefepime (last day 6/11 to complete a total of 14 days), d/c on 612  - sputum culture 6/10 neg, Blood Cx 6/10 Neg and cleared from 6/1, UA 6/6 +  - Cefepime d/c iso thrombocytopenia. No need fo abx therapy due to clinical improvement.   - ID following, f/u ID recs    ====Heme/Onc====  #thrombocytopenia likelt 2/2 cefepime  -HIT panel negative, XENIA negative  -fibrinogen low, PTT elevated, but d-dimer low; inconsistent with DIC  - PLT 88 from 103, no active bleeding  - follow CBC from 6/12  - keep PLT > 50  - heme/onc following : QMA    #anemia  -h/o suspected GIB, s/p EGD; negative for GIB (5/30), repeat EGD 6/12 with improving esophagitis and no signs of active bleeding  -s/p 2 units PRBCs with appropriate response  6.8> 9.7  -no evidence of hemolysis, hapto and bili WNL  -transfuse < 7  - Start lovenox tmrw if hgb stable and plt continue to uptrend    ====Endo====  #bG goal: 140-180  - ISS for poor oral intake    #Hypothyroidism   - continue synthroid 137mcg daily    ====Skin/lines====  #Sacral and right gluteal pressure ulcer   - Wound care as ordered   #peripheral: 2 peripherals in each arm  #CVC: [facility] (6/9)  #daley: yes    ====Ppx====  #DVT: hold given acute anemia  #GI: PPI   #Palliative care following     Emergency Contact: Brother Germán  63 year old Male, from [place], AOx3, ambulates with walker , chronic FC, h/o obesity, HTN, HLD, PVD, Seizures, CKD4 (base SCr 3.8), anemia, BPH, Lymphedema, hypothyroidism, HFpEF, possible COPD, decubitus ulcer (sacrum/gluteal), polyneuropathy, polyarthritis, bipolar disorder, presented to ED for abdominal pain, nausea and vomiting brown emesis. Admitted to ICU for Hypotension likely from Sepsis secondary to complicated UTI and esophagitis. Readmitted to ICU status post RRT for hypotension and concern for evolving septic vs hypovolemic shock.     Plan  ====Neuro====  #Baseline mentation: AAOx2   - lethargic, likely due to metabolic encephalopathy in setting of shock state and organ dysfunction    #Seizure  - Continue Valproic Acid IVPB 500mg BID  - no seizure activities, seizure precautions in place     #Bipolar disorder.   - Wellbutrin, Trazadone being held due to continued po intolerance  - Consult psych for IM regimen while not tolerating po  - Continue Depakote    #Failure to thrive  - Psych and Palliative following    ====CVS====  #Sepsis 2/2 pseudomonas bacteremia and UTI   #Hemorrhagic shock 2/2 Ulcerative esophagitis  - was already on cefepime (last day 6/11 to complete a total of 14 days), d/c on 612  - off levophed as of 6/13  - Given 1x bolus of 1L LR   - sputum culture 6/10 neg, Blood Cx 6/10 Neg and cleared from 6/1, UA 6/6 +  - ID following, f/u ID recs    ====Pulm====  #No acute issues  - cont with NC   - monitor O2 sat and titrate off as indicated  - repeat CXR today -> repeat CXR unchanged from prior  - +Staph on Nares PCR    ====GI====  #Diet: Clear Liquid diet.  #Bowel regimen: none    #Ulcerative esophagitis  - cont. w/ PPI and Carafate  - s/p EGD 5/30/23 -> contact GI in AM 6/12 for possible repeat EGD given patient required 2units PRBCs on 6/10  - Hgb 6/12 stable @ 8.9, no signs of active bleeding or hematemesis   - GI following, appreciate recs  - Send FOBT with neg BM    #Persistent Nausea 2/2 ulcerative esophagitis vs. gastroparesis   - Reglan 10mg q6h  - Zofran 4mg prn  - Consider placement of NGT for nutrition if nausea and vomiting persist  - Clear liquid diet started as patient tolerates, avoid carbonated beverages or orange juice    ====Renal====  #SHEYLA on CKD  #Hypokalemia likely 2/2 Distal RTA  - Cr plateaued 5.2 now 4.94   - monitor BMP and UOP  - no emergent need for HD at this time  - replete electrolytes prn, replete K if less than 3  - Maintain K>4, Mag >2, Ph >3  - nephro following  - bicarb gtt d/cd 6/11, trend venous pH    #BPH  - cont with finasteride and tamsulosin  - cont daley    ====ID====  #Sepsis 2/2 pseudomonas bacteremia and UTI   - was already on cefepime (last day 6/11 to complete a total of 14 days), d/c on 612  - sputum culture 6/10 neg, Blood Cx 6/10 Neg and cleared from 6/1, UA 6/6 +  - Cefepime d/c iso thrombocytopenia. No need fo abx therapy due to clinical improvement.   - ID following, f/u ID recs    ====Heme/Onc====  #thrombocytopenia likelt 2/2 cefepime  -HIT panel negative, XENIA negative  -fibrinogen low, PTT elevated, but d-dimer low; inconsistent with DIC  - PLT 88 from 103, no active bleeding  - follow CBC from 6/12  - keep PLT > 50  - heme/onc following : QMA    #anemia  -h/o suspected GIB, s/p EGD; negative for GIB (5/30), repeat EGD 6/12 with improving esophagitis and no signs of active bleeding  -s/p 2 units PRBCs with appropriate response  6.8> 9.7  -no evidence of hemolysis, hapto and bili WNL  -transfuse < 7  - Start lovenox tmrw if hgb stable and plt continue to uptrend    ====Endo====  #bG goal: 140-180  - ISS for poor oral intake    #Hypothyroidism   - continue synthroid 137mcg daily    ====Skin/lines====  #Sacral and right gluteal pressure ulcer   - Wound care as ordered   #peripheral: 2 peripherals in each arm  #CVC: [facility] (6/9)  #daley: yes    ====Ppx====  #DVT: hold given acute anemia  #GI: PPI   #Palliative care following     Emergency Contact: Brother Germán  63 year old Male, from [place], AOx3, ambulates with walker , chronic FC, h/o obesity, HTN, HLD, PVD, Seizures, CKD4 (base SCr 3.8), anemia, BPH, Lymphedema, hypothyroidism, HFpEF, possible COPD, decubitus ulcer (sacrum/gluteal), polyneuropathy, polyarthritis, bipolar disorder, presented to ED for abdominal pain, nausea and vomiting brown emesis. Admitted to ICU for Hypotension likely from Sepsis secondary to complicated UTI and esophagitis. Readmitted to ICU status post RRT for hypotension and concern for evolving septic vs hypovolemic shock.     Plan  ====Neuro====  #Baseline mentation: AAOx2 and able to be reoriented  - No active issues.     #Seizure  - Continue Valproic Acid IVPB 500mg BID  - no seizure activities, seizure precautions in place     #Bipolar disorder.   - Wellbutrin, Trazadone being held due to continued po intolerance  - Consult Psych for IM regimen while not tolerating po  - Continue Depakote    #Failure to thrive  - Palliative following    ====CVS====  #Sepsis 2/2 pseudomonas bacteremia and UTI   #Hemorrhagic shock 2/2 Ulcerative esophagitis  - was already on cefepime (last day 6/11 to complete a total of 14 days), d/c on 612  - off levophed as of 6/13  - Given 1x bolus of 1L LR   - sputum culture 6/10 neg, Blood Cx 6/10 Neg and cleared from 6/1, UA 6/6 +  - ID following, f/u ID recs    ====Pulm====  #No acute issues  - cont with NC as needed  - monitor O2 sat and titrate off as indicated  - repeat CXR today -> repeat CXR unchanged from prior  - 6/12 ON had episode of tachypnea and wheezing, given 1x dose of lasix. No concern at this time for acute pulmonary edema.   - +Staph on Nares PCR    ====GI====  #Diet: Clear Liquid diet.  #Bowel regimen: none    #Ulcerative esophagitis  - cont. w/ PPI and Carafate  - s/p EGD 5/30/23 -> contact GI in AM 6/12 for possible repeat EGD given patient required 2units PRBCs on 6/10  - Repeat EGD on 6/13 with no active bleeding, duodenal ulcer, and improving esophagitis  - Hgb 6/12 stable @ 8.9, no signs of active bleeding or hematemesis   - GI following, appreciate recs  - Send FOBT with neg BM    #Persistent Nausea 2/2 ulcerative esophagitis vs. gastroparesis   - Reglan 10mg q6h  - Zofran 4mg prn  - Consider placement of NGT for nutrition if nausea and vomiting persist  - Clear liquid diet started as patient tolerates, avoid carbonated beverages or orange juice    ====Renal====  #SHEYLA on CKD  #Hypokalemia likely 2/2 Distal RTA  - Cr plateaued 5.2 now 4.94   - monitor BMP and UOP  - no emergent need for HD at this time  - replete electrolytes prn, replete K if less than 3  - Maintain K>4, Mag >2, Ph >3  - nephro following  - bicarb gtt d/cd 6/11, trend venous pH    #BPH  - cont with finasteride and tamsulosin  - cont daley    ====ID====  #Sepsis 2/2 pseudomonas bacteremia and UTI   - was already on cefepime (last day 6/11 to complete a total of 14 days), d/c on 612  - sputum culture 6/10 neg, Blood Cx 6/10 Neg and cleared from 6/1, UA 6/6 +  - Cefepime d/c iso thrombocytopenia. No need fo abx therapy due to clinical improvement.   - ID following, f/u ID recs    ====Heme/Onc====  #thrombocytopenia likely 2/2 cefepime  -HIT panel negative, XENIA negative  -fibrinogen low, PTT elevated, but d-dimer low; inconsistent with DIC  - PLT 88 from 103, no active bleeding  - follow CBC from 6/12  - keep PLT > 50  - heme/onc following : QMA    #anemia  -h/o suspected GIB, s/p EGD; negative for GIB (5/30), repeat EGD 6/12 with improving esophagitis and no signs of active bleeding  -s/p 2 units PRBCs with appropriate response  6.8> 9.7  -no evidence of hemolysis, hapto and bili WNL  -transfuse < 7  - Start subq heparin tmrw if hgb stable and plt continue to uptrend    ====Endo====  #bG goal: 140-180  - ISS for poor oral intake    #Hypothyroidism   - continue synthroid 137mcg daily    ====Skin/lines====  #Sacral and right gluteal pressure ulcer   - Wound care as ordered   #peripheral: 2 peripherals in each arm  #CVC: [facility] (6/9)  #daley: yes    ====Ppx====  #DVT: hold given acute anemia  #GI: PPI   #Palliative care following, maintains Full Code    Emergency Contact: Brother Germán  63 year old Male, from [place], AOx3, ambulates with walker , chronic FC, h/o obesity, HTN, HLD, PVD, Seizures, CKD4 (base SCr 3.8), anemia, BPH, Lymphedema, hypothyroidism, HFpEF, possible COPD, decubitus ulcer (sacrum/gluteal), polyneuropathy, polyarthritis, bipolar disorder, presented to ED for abdominal pain, nausea and vomiting brown emesis. Admitted to ICU for Hypotension likely from Sepsis secondary to complicated UTI and esophagitis. Readmitted to ICU status post RRT for hypotension and concern for evolving septic vs hypovolemic shock.     Plan  ====Neuro====  #Baseline mentation: AAOx2 and able to be reoriented  - No active issues.     #Seizure  - Continue Valproic Acid IVPB 500mg BID  - no seizure activities, seizure precautions in place     #Bipolar disorder.   - Wellbutrin, Trazadone being held due to continued po intolerance  - Consult Psych for IM regimen while not tolerating po  - Continue Depakote    #Failure to thrive  - Palliative following    ====CVS====  #Sepsis 2/2 pseudomonas bacteremia and UTI   #Hemorrhagic shock 2/2 Ulcerative esophagitis  - was already on cefepime (last day 6/11 to complete a total of 14 days), d/c on 612  - off levophed as of 6/13  - Given 1x bolus of 1L LR   - sputum culture 6/10 neg, Blood Cx 6/10 Neg and cleared from 6/1, UA 6/6 +  - ID following, f/u ID recs    ====Pulm====  #No acute issues  - cont with NC as needed  - monitor O2 sat and titrate off as indicated  - repeat CXR today -> repeat CXR unchanged from prior  - 6/12 ON had episode of tachypnea and wheezing, given 1x dose of lasix. No concern at this time for acute pulmonary edema.   - +Staph on Nares PCR    ====GI====  #Diet: Clear Liquid diet.  #Bowel regimen: none    #Ulcerative esophagitis  - cont. w/ PPI and Carafate  - s/p EGD 5/30/23 -> contact GI in AM 6/12 for possible repeat EGD given patient required 2units PRBCs on 6/10  - Repeat EGD on 6/13 with no active bleeding, duodenal ulcer, and improving esophagitis  - Hgb 6/12 stable @ 8.9, no signs of active bleeding or hematemesis   - GI following, appreciate recs  - Send FOBT with neg BM    #Persistent Nausea 2/2 ulcerative esophagitis vs. gastroparesis   - Reglan 10mg q6h  - Zofran 4mg prn  - Consider placement of NGT for nutrition if nausea and vomiting persist  - Clear liquid diet started as patient tolerates, avoid carbonated beverages or orange juice    ====Renal====  #SHEYLA on CKD  #Hypokalemia likely 2/2 Distal RTA  - Cr plateaued 5.2 now 4.94   - monitor BMP and UOP  - no emergent need for HD at this time  - replete electrolytes prn, replete K if less than 3  - Maintain K>4, Mag >2, Ph >3  - nephro following  - bicarb gtt d/cd 6/11, trend venous pH    #BPH  - cont with finasteride and tamsulosin  - cont daley    ====ID====  #Sepsis 2/2 pseudomonas bacteremia and UTI   - was already on cefepime (last day 6/11 to complete a total of 14 days), d/c on 612  - sputum culture 6/10 neg, Blood Cx 6/10 Neg and cleared from 6/1, UA 6/6 +  - Cefepime d/c iso thrombocytopenia. No need fo abx therapy due to clinical improvement.   - ID following, f/u ID recs    ====Heme/Onc====  #thrombocytopenia likely 2/2 cefepime  -HIT panel negative, XENIA negative  -fibrinogen low, PTT elevated, but d-dimer low; inconsistent with DIC  - PLT 88 from 103, no active bleeding  - follow CBC from 6/12  - keep PLT > 50  - f/u hep panel, ferritin, B12, folate, SPEP, ROSANNE, FABRICE, RF  - f/u daily d-dimer and fibrinogen  - heme/onc following, appreciate recs    #anemia  -h/o suspected GIB, s/p EGD; negative for GIB (5/30), repeat EGD 6/12 with improving esophagitis and no signs of active bleeding  -s/p 2 units PRBCs with appropriate response  6.8> 9.7  - o evidence of hemolysis, hapto and bili WNL  -transfuse < 7  - Start subq heparin tmrw if hgb stable and plt continue to uptrend    ====Endo====  #bG goal: 140-180  - ISS for poor oral intake    #Hypothyroidism   - continue synthroid 137mcg daily    ====Skin/lines====  #Sacral and right gluteal pressure ulcer   - Wound care as ordered   #peripheral: 2 peripherals in each arm  #CVC: [facility] (6/9)  #daley: yes    ====Ppx====  #DVT: hold given acute anemia  #GI: PPI   #Palliative care following, maintains Full Code    Emergency Contact: Brother Germán

## 2023-06-13 NOTE — PHYSICAL THERAPY INITIAL EVALUATION ADULT - GENERAL OBSERVATIONS, REHAB EVAL
Patient was seen in supine on bed in ICU,+supp.O2 via NC at 2L
Patient was seen in supine on bed,+IV line

## 2023-06-13 NOTE — PROGRESS NOTE ADULT - SUBJECTIVE AND OBJECTIVE BOX
Patient is a 63y old  Male who presents with a chief complaint of sepsis       SUBJECTIVE / OVERNIGHT EVENTS:      MEDICATIONS  (STANDING):  albuterol    90 MICROgram(s) HFA Inhaler 2 Puff(s) Inhalation every 6 hours  chlorhexidine 2% Cloths 1 Application(s) Topical <User Schedule>  dorzolamide 2% Ophthalmic Solution 1 Drop(s) Right EYE <User Schedule>  metoclopramide Injectable 10 milliGRAM(s) IV Push every 6 hours  norepinephrine Infusion 0.05 MICROgram(s)/kG/Min (3.95 mL/Hr) IV Continuous <Continuous>  nystatin Powder 1 Application(s) Topical two times a day  pantoprazole  Injectable 40 milliGRAM(s) IV Push every 24 hours  sucralfate suspension 1 Gram(s) Oral four times a day  valproate sodium  IVPB 500 milliGRAM(s) IV Intermittent two times a day  vitamin A &amp; D Ointment 1 Application(s) Topical at bedtime    MEDICATIONS  (PRN):  ondansetron Injectable 4 milliGRAM(s) IV Push every 8 hours PRN Nausea and/or Vomiting  sodium chloride 0.9% lock flush 10 milliLiter(s) IV Push every 1 hour PRN Pre/post blood products, medications, blood draw, and to maintain line patency    CAPILLARY BLOOD GLUCOSE      POCT Blood Glucose.: 88 mg/dL (13 Jun 2023 00:56)    I&O's Summary    12 Jun 2023 07:01  -  13 Jun 2023 07:00  --------------------------------------------------------  IN: 2339.4 mL / OUT: 1610 mL / NET: 729.4 mL        PHYSICAL EXAM:  Vital Signs Last 24 Hrs  T(C): 36 (13 Jun 2023 08:00), Max: 36.5 (12 Jun 2023 17:01)  T(F): 96.8 (13 Jun 2023 08:00), Max: 97.7 (12 Jun 2023 17:01)  HR: 127 (13 Jun 2023 11:15) (57 - 127)  BP: 115/57 (13 Jun 2023 11:15) (90/51 - 128/66)  BP(mean): 70 (13 Jun 2023 11:15) (60 - 105)  RR: 26 (13 Jun 2023 11:15) (15 - 34)  SpO2: 93% (13 Jun 2023 11:15) (90% - 100%)    Parameters below as of 13 Jun 2023 07:30  Patient On (Oxygen Delivery Method): room air          LABS:                        9.0    7.71  )-----------( 81       ( 13 Jun 2023 03:10 )             27.0     06-13    144  |  116<H>  |  49<H>  ----------------------------<  93  3.7   |  19<L>  |  4.63<H>    Ca    8.9      13 Jun 2023 03:10  Phos  2.3     06-13  Mg     2.2     06-13    TPro  4.7<L>  /  Alb  1.8<L>  /  TBili  0.3  /  DBili  x   /  AST  16  /  ALT  7<L>  /  AlkPhos  81  06-12    PT/INR - ( 13 Jun 2023 03:10 )   PT: 18.1 sec;   INR: 1.51 ratio         PTT - ( 13 Jun 2023 03:10 )  PTT:44.5 sec          SARS-CoV-2: NotDetec (10 Carlin 2023 10:35)  COVID-19 PCR: NotDetec (07 Jun 2023 06:43)  COVID-19 PCR: NotDetec (04 Jun 2023 18:40)  SARS-CoV-2: NotDetec (28 May 2023 21:02)      RADIOLOGY & ADDITIONAL TESTS:       Patient is a 63y old  Male who presents with a chief complaint of sepsis       SUBJECTIVE / OVERNIGHT EVENTS: events noted. No new complaints. Ongoing Nausea, denies H/A, dizziness, visual changes      MEDICATIONS  (STANDING):  albuterol    90 MICROgram(s) HFA Inhaler 2 Puff(s) Inhalation every 6 hours  chlorhexidine 2% Cloths 1 Application(s) Topical <User Schedule>  dorzolamide 2% Ophthalmic Solution 1 Drop(s) Right EYE <User Schedule>  metoclopramide Injectable 10 milliGRAM(s) IV Push every 6 hours  norepinephrine Infusion 0.05 MICROgram(s)/kG/Min (3.95 mL/Hr) IV Continuous <Continuous>  nystatin Powder 1 Application(s) Topical two times a day  pantoprazole  Injectable 40 milliGRAM(s) IV Push every 24 hours  sucralfate suspension 1 Gram(s) Oral four times a day  valproate sodium  IVPB 500 milliGRAM(s) IV Intermittent two times a day  vitamin A &amp; D Ointment 1 Application(s) Topical at bedtime    MEDICATIONS  (PRN):  ondansetron Injectable 4 milliGRAM(s) IV Push every 8 hours PRN Nausea and/or Vomiting  sodium chloride 0.9% lock flush 10 milliLiter(s) IV Push every 1 hour PRN Pre/post blood products, medications, blood draw, and to maintain line patency    CAPILLARY BLOOD GLUCOSE      POCT Blood Glucose.: 88 mg/dL (13 Jun 2023 00:56)    I&O's Summary    12 Jun 2023 07:01  -  13 Jun 2023 07:00  --------------------------------------------------------  IN: 2339.4 mL / OUT: 1610 mL / NET: 729.4 mL        PHYSICAL EXAM:  Vital Signs Last 24 Hrs  T(C): 36 (13 Jun 2023 08:00), Max: 36.5 (12 Jun 2023 17:01)  T(F): 96.8 (13 Jun 2023 08:00), Max: 97.7 (12 Jun 2023 17:01)  HR: 127 (13 Jun 2023 11:15) (57 - 127)  BP: 115/57 (13 Jun 2023 11:15) (90/51 - 128/66)  BP(mean): 70 (13 Jun 2023 11:15) (60 - 105)  RR: 26 (13 Jun 2023 11:15) (15 - 34)  SpO2: 93% (13 Jun 2023 11:15) (90% - 100%)    Parameters below as of 13 Jun 2023 07:30  Patient On (Oxygen Delivery Method): room air      GEN: NAD; A and O x 2, ill-appearing, pallor, lethargic  LUNGS: scattered rhonchi  HEART: S1 S2  ABDOMEN: soft, non-tender, non-distended, + BS  EXTREMITIES: B/LE edema, hyperpigmentation        LABS:                        9.0    7.71  )-----------( 81       ( 13 Jun 2023 03:10 )             27.0     06-13    144  |  116<H>  |  49<H>  ----------------------------<  93  3.7   |  19<L>  |  4.63<H>    Ca    8.9      13 Jun 2023 03:10  Phos  2.3     06-13  Mg     2.2     06-13    TPro  4.7<L>  /  Alb  1.8<L>  /  TBili  0.3  /  DBili  x   /  AST  16  /  ALT  7<L>  /  AlkPhos  81  06-12    PT/INR - ( 13 Jun 2023 03:10 )   PT: 18.1 sec;   INR: 1.51 ratio         PTT - ( 13 Jun 2023 03:10 )  PTT:44.5 sec          SARS-CoV-2: NotDetec (10 Carlin 2023 10:35)  COVID-19 PCR: NotDetec (07 Jun 2023 06:43)  COVID-19 PCR: NotDetec (04 Jun 2023 18:40)  SARS-CoV-2: NotDetec (28 May 2023 21:02)      RADIOLOGY & ADDITIONAL TESTS:    < from: EGD (06.12.23 @ 13:00) >  Findings:        Esophagus Additional esophagus findings A shallow diverticulum seentowards    distal esophagus. 95% of prior esophagitis completely healed. Otherwise    unremarkable..        Stomach Mucosa Localized Single inflammatory nodule noted in antrum. No biopsies    take due to elevated INR. Of the mucosa with no bleeding was noted in the    Antrum. These findings are compatible with Inflammatory Nodule.        Duodenum Mucosa Normal mucosa was noted in the whole examined duodenum.        Impressions:        A shallow diverticulum seen towards distal esophagus. 95% of prior esophagitis    completely healed. Otherwise unremarkable. .        Single inflammatory nodule noted in antrum. No biopsies take due to elevated    INR. In the Antrum compatible with Inflammatory Nodule.        Normal mucosa in the whole examined duodenum.        Plan:        Continue PPI QD or BID Diet as tolerated Call back as needed but GI signing off    < end of copied text >

## 2023-06-13 NOTE — PROGRESS NOTE ADULT - SUBJECTIVE AND OBJECTIVE BOX
INTERVAL HPI/OVERNIGHT EVENTS:       PRESSORS: [ ] YES [ ] NO  WHICH:    ANTIBIOTICS:                  DATE STARTED:  ANTIBIOTICS:                  DATE STARTED:    Antimicrobial:    Cardiovascular:  norepinephrine Infusion 0.05 MICROgram(s)/kG/Min IV Continuous <Continuous>    Pulmonary:  albuterol    90 MICROgram(s) HFA Inhaler 2 Puff(s) Inhalation every 6 hours    Hematalogic:    Other:  buPROPion XL (24-Hour) . 150 milliGRAM(s) Oral daily  chlorhexidine 2% Cloths 1 Application(s) Topical <User Schedule>  dorzolamide 2% Ophthalmic Solution 1 Drop(s) Right EYE <User Schedule>  finasteride 5 milliGRAM(s) Oral daily  nystatin Powder 1 Application(s) Topical two times a day  ondansetron Injectable 4 milliGRAM(s) IV Push every 8 hours PRN  pantoprazole  Injectable 40 milliGRAM(s) IV Push every 24 hours  sodium bicarbonate 1300 milliGRAM(s) Oral three times a day  sodium chloride 0.9% lock flush 10 milliLiter(s) IV Push every 1 hour PRN  sucralfate suspension 1 Gram(s) Oral four times a day  tamsulosin 0.4 milliGRAM(s) Oral at bedtime  traZODone 50 milliGRAM(s) Oral at bedtime  valproate sodium  IVPB 500 milliGRAM(s) IV Intermittent two times a day  vitamin A &amp; D Ointment 1 Application(s) Topical at bedtime    albuterol    90 MICROgram(s) HFA Inhaler 2 Puff(s) Inhalation every 6 hours  buPROPion XL (24-Hour) . 150 milliGRAM(s) Oral daily  chlorhexidine 2% Cloths 1 Application(s) Topical <User Schedule>  dorzolamide 2% Ophthalmic Solution 1 Drop(s) Right EYE <User Schedule>  finasteride 5 milliGRAM(s) Oral daily  norepinephrine Infusion 0.05 MICROgram(s)/kG/Min IV Continuous <Continuous>  nystatin Powder 1 Application(s) Topical two times a day  ondansetron Injectable 4 milliGRAM(s) IV Push every 8 hours PRN  pantoprazole  Injectable 40 milliGRAM(s) IV Push every 24 hours  sodium bicarbonate 1300 milliGRAM(s) Oral three times a day  sodium chloride 0.9% lock flush 10 milliLiter(s) IV Push every 1 hour PRN  sucralfate suspension 1 Gram(s) Oral four times a day  tamsulosin 0.4 milliGRAM(s) Oral at bedtime  traZODone 50 milliGRAM(s) Oral at bedtime  valproate sodium  IVPB 500 milliGRAM(s) IV Intermittent two times a day  vitamin A &amp; D Ointment 1 Application(s) Topical at bedtime    Drug Dosing Weight  Height (cm): 172.7 (09 Jun 2023 21:00)  Weight (kg): 84.2 (09 Jun 2023 21:00)  BMI (kg/m2): 28.2 (09 Jun 2023 21:00)  BSA (m2): 1.98 (09 Jun 2023 21:00)    PHYSICAL EXAM:  GENERAL: NAD  EYES: EOMI, PERRLA  NECK: Supple, No JVD; Normal thyroid; Trachea midline: No LAD   NERVOUS SYSTEM:  Alert & Oriented X3,  Motor Strength 5/5 B/L upper and lower extremities; DTRs 2+ intact and symmetric  CHEST/LUNG: No rales, rhonchi, wheezing, breath sounds present bilaterally  HEART: Regular rate and rhythm; No murmurs, no gallops  ABDOMEN: Soft, Nontender, Nondistended; Bowel sounds present, no pain or masses on palpation  : voiding well, [place] in place  EXTREMITIES:  2+ Peripheral Pulses, No clubbing, cyanosis, or edema  SKIN: warm, intact, no lesions     LINES/DRAINS/DEVICES  CENTRAL LINE: [ ] YES [ ] NO  LOCATION:     HATFIELD: [ ] YES [ ] NO     A-LINE:  [ ] YES [ ] NO  LOCATION:       ICU Vital Signs Last 24 Hrs  T(C): 36 (13 Jun 2023 08:00), Max: 36.5 (12 Jun 2023 17:01)  T(F): 96.8 (13 Jun 2023 08:00), Max: 97.7 (12 Jun 2023 17:01)  HR: 115 (13 Jun 2023 09:00) (53 - 124)  BP: 104/64 (13 Jun 2023 09:00) (90/51 - 128/66)  BP(mean): 73 (13 Jun 2023 09:00) (51 - 105)  ABP: --  ABP(mean): --  RR: 23 (13 Jun 2023 09:00) (15 - 34)  SpO2: 97% (13 Jun 2023 09:00) (90% - 100%)    O2 Parameters below as of 13 Jun 2023 07:30  Patient On (Oxygen Delivery Method): room air                  06-12 @ 07:01  -  06-13 @ 07:00  --------------------------------------------------------  IN: 2339.4 mL / OUT: 1610 mL / NET: 729.4 mL              LABS:  CBC Full  -  ( 13 Jun 2023 03:10 )  WBC Count : 7.71 K/uL  RBC Count : 3.03 M/uL  Hemoglobin : 9.0 g/dL  Hematocrit : 27.0 %  Platelet Count - Automated : 81 K/uL  Mean Cell Volume : 89.1 fl  Mean Cell Hemoglobin : 29.7 pg  Mean Cell Hemoglobin Concentration : 33.3 gm/dL  Auto Neutrophil # : x  Auto Lymphocyte # : x  Auto Monocyte # : x  Auto Eosinophil # : x  Auto Basophil # : x  Auto Neutrophil % : x  Auto Lymphocyte % : x  Auto Monocyte % : x  Auto Eosinophil % : x  Auto Basophil % : x    06-13    144  |  116<H>  |  49<H>  ----------------------------<  93  3.7   |  19<L>  |  4.63<H>    Ca    8.9      13 Jun 2023 03:10  Phos  2.3     06-13  Mg     2.2     06-13    TPro  4.7<L>  /  Alb  1.8<L>  /  TBili  0.3  /  DBili  x   /  AST  16  /  ALT  7<L>  /  AlkPhos  81  06-12    PT/INR - ( 13 Jun 2023 03:10 )   PT: 18.1 sec;   INR: 1.51 ratio         PTT - ( 13 Jun 2023 03:10 )  PTT:44.5 sec    Culture Results:   Normal Respiratory Ivelisse present (06-10 @ 10:23)      RADIOLOGY & ADDITIONAL STUDIES REVIEWED DURING TEAM ROUNDS    [ ]GOALS OF CARE DISCUSSION WITH PATIENT/FAMILY/PROXY:    CRITICAL CARE TIME SPENT: 35 minutes   INTERVAL HPI/OVERNIGHT EVENTS:   - Overnight episode of SOB and wheezing without hypoxia. Given 1x dose of lasix.     PRESSORS: [X] YES [ ] NO  WHICH: Levophed    Antimicrobial: None    Cardiovascular:  norepinephrine Infusion 0.05 MICROgram(s)/kG/Min IV Continuous <Continuous>    Pulmonary:  albuterol    90 MICROgram(s) HFA Inhaler 2 Puff(s) Inhalation every 6 hours    Hematalogic:    Other:  buPROPion XL (24-Hour) . 150 milliGRAM(s) Oral daily  chlorhexidine 2% Cloths 1 Application(s) Topical <User Schedule>  dorzolamide 2% Ophthalmic Solution 1 Drop(s) Right EYE <User Schedule>  finasteride 5 milliGRAM(s) Oral daily  nystatin Powder 1 Application(s) Topical two times a day  ondansetron Injectable 4 milliGRAM(s) IV Push every 8 hours PRN  pantoprazole  Injectable 40 milliGRAM(s) IV Push every 24 hours  sodium bicarbonate 1300 milliGRAM(s) Oral three times a day  sodium chloride 0.9% lock flush 10 milliLiter(s) IV Push every 1 hour PRN  sucralfate suspension 1 Gram(s) Oral four times a day  tamsulosin 0.4 milliGRAM(s) Oral at bedtime  traZODone 50 milliGRAM(s) Oral at bedtime  valproate sodium  IVPB 500 milliGRAM(s) IV Intermittent two times a day  vitamin A &amp; D Ointment 1 Application(s) Topical at bedtime    albuterol    90 MICROgram(s) HFA Inhaler 2 Puff(s) Inhalation every 6 hours  buPROPion XL (24-Hour) . 150 milliGRAM(s) Oral daily  chlorhexidine 2% Cloths 1 Application(s) Topical <User Schedule>  dorzolamide 2% Ophthalmic Solution 1 Drop(s) Right EYE <User Schedule>  finasteride 5 milliGRAM(s) Oral daily  norepinephrine Infusion 0.05 MICROgram(s)/kG/Min IV Continuous <Continuous>  nystatin Powder 1 Application(s) Topical two times a day  ondansetron Injectable 4 milliGRAM(s) IV Push every 8 hours PRN  pantoprazole  Injectable 40 milliGRAM(s) IV Push every 24 hours  sodium bicarbonate 1300 milliGRAM(s) Oral three times a day  sodium chloride 0.9% lock flush 10 milliLiter(s) IV Push every 1 hour PRN  sucralfate suspension 1 Gram(s) Oral four times a day  tamsulosin 0.4 milliGRAM(s) Oral at bedtime  traZODone 50 milliGRAM(s) Oral at bedtime  valproate sodium  IVPB 500 milliGRAM(s) IV Intermittent two times a day  vitamin A &amp; D Ointment 1 Application(s) Topical at bedtime    Drug Dosing Weight  Height (cm): 172.7 (09 Jun 2023 21:00)  Weight (kg): 84.2 (09 Jun 2023 21:00)  BMI (kg/m2): 28.2 (09 Jun 2023 21:00)  BSA (m2): 1.98 (09 Jun 2023 21:00)    PHYSICAL EXAM:  GENERAL: Chronically ill appearing, obese, supine in bed. NAD.   HEAD: Atraumatic, Normocephalic  EYES: EOMI, PERRL, conjunctiva and sclera clear  ENMT: No oropharyngeal exudates, erythematous, moist, oral mucosa. Moist mucous membranes. Denies painful swallowing.  NECK: Supple, no cervical lymphadenopathy, No JVD.   NERVOUS SYSTEM: Alert & Oriented x2, intermittent confusion. Moves all extremities on command. No focal neuro deficits.    CHEST/LUNG: Lung sounds clear bilaterally to auscultation. POCUS: Bilateral A-lines with scattered B-lines.   HEART: S1/S2 without murmurs, without rubs, or gallops.   ABDOMEN: Soft, Nontender, distended; Bowel sounds present, Bladder non distended, non palpable  EXTREMITIES: Pulses palpable radial pulses 2+ bilat, without cyanosis. Digits warm to touch with good cap refill < 3 secs  SKIN: warm, dry, intact, normal color, scattered ecchymosis to abdomen. Sacral pressure ulcer.     LINES/DRAINS/DEVICES  CENTRAL LINE: [ ] YES [ ] NO  LOCATION:     HATFIELD: [X] YES [ ] NO     A-LINE:  [ ] YES [X] NO  LOCATION:       ICU Vital Signs Last 24 Hrs  T(C): 36 (13 Jun 2023 08:00), Max: 36.5 (12 Jun 2023 17:01)  T(F): 96.8 (13 Jun 2023 08:00), Max: 97.7 (12 Jun 2023 17:01)  HR: 115 (13 Jun 2023 09:00) (53 - 124)  BP: 104/64 (13 Jun 2023 09:00) (90/51 - 128/66)  BP(mean): 73 (13 Jun 2023 09:00) (51 - 105)  ABP: --  ABP(mean): --  RR: 23 (13 Jun 2023 09:00) (15 - 34)  SpO2: 97% (13 Jun 2023 09:00) (90% - 100%)    O2 Parameters below as of 13 Jun 2023 07:30  Patient On (Oxygen Delivery Method): room air      06-12 @ 07:01  -  06-13 @ 07:00  --------------------------------------------------------  IN: 2339.4 mL / OUT: 1610 mL / NET: 729.4 mL      LABS:  CBC Full  -  ( 13 Jun 2023 03:10 )  WBC Count : 7.71 K/uL  RBC Count : 3.03 M/uL  Hemoglobin : 9.0 g/dL  Hematocrit : 27.0 %  Platelet Count - Automated : 81 K/uL  Mean Cell Volume : 89.1 fl  Mean Cell Hemoglobin : 29.7 pg  Mean Cell Hemoglobin Concentration : 33.3 gm/dL    06-13    144  |  116<H>  |  49<H>  ----------------------------<  93  3.7   |  19<L>  |  4.63<H>    Ca    8.9      13 Jun 2023 03:10  Phos  2.3     06-13  Mg     2.2     06-13    TPro  4.7<L>  /  Alb  1.8<L>  /  TBili  0.3  /  DBili  x   /  AST  16  /  ALT  7<L>  /  AlkPhos  81  06-12    PT/INR - ( 13 Jun 2023 03:10 )   PT: 18.1 sec;   INR: 1.51 ratio         PTT - ( 13 Jun 2023 03:10 )  PTT:44.5 sec    Culture Results:   Normal Respiratory Ivelisse present (06-10 @ 10:23)      RADIOLOGY & ADDITIONAL STUDIES REVIEWED DURING TEAM ROUNDS

## 2023-06-13 NOTE — PROGRESS NOTE ADULT - SUBJECTIVE AND OBJECTIVE BOX
Patient is a 63y old  Male who presents with a chief complaint of sepsis (2023 17:38)    PATIENT IS SEEN AND EXAMINED IN ICU.    ALLERGIES:  No Known Allergies      Daily     Daily Weight in k.3 (2023 07:30)    VITALS:    Vital Signs Last 24 Hrs  T(C): 36.5 (2023 20:00), Max: 36.5 (2023 20:00)  T(F): 97.7 (2023 20:00), Max: 97.7 (2023 20:00)  HR: 107 (2023 23:00) (77 - 127)  BP: 105/68 (2023 23:00) (84/50 - 123/75)  BP(mean): 80 (2023 23:00) (58 - 97)  RR: 22 (2023 23:00) (14 - 32)  SpO2: 96% (2023 23:00) (90% - 100%)    Parameters below as of 2023 20:00  Patient On (Oxygen Delivery Method): room air        LABS:    CBC Full  -  ( 2023 03:10 )  WBC Count : 7.71 K/uL  RBC Count : 3.03 M/uL  Hemoglobin : 9.0 g/dL  Hematocrit : 27.0 %  Platelet Count - Automated : 81 K/uL  Mean Cell Volume : 89.1 fl  Mean Cell Hemoglobin : 29.7 pg  Mean Cell Hemoglobin Concentration : 33.3 gm/dL  Auto Neutrophil # : x  Auto Lymphocyte # : x  Auto Monocyte # : x  Auto Eosinophil # : x  Auto Basophil # : x  Auto Neutrophil % : x  Auto Lymphocyte % : x  Auto Monocyte % : x  Auto Eosinophil % : x  Auto Basophil % : x    PT/INR - ( 2023 03:10 )   PT: 18.1 sec;   INR: 1.51 ratio         PTT - ( 2023 03:10 )  PTT:44.5 sec  -13    147<H>  |  118<H>  |  50<H>  ----------------------------<  90  4.1   |  19<L>  |  4.82<H>    Ca    9.4      2023 14:40  Phos  3.1     06-13  Mg     2.1     06-13    TPro  4.6<L>  /  Alb  x   /  TBili  x   /  DBili  x   /  AST  x   /  ALT  x   /  AlkPhos  x       CAPILLARY BLOOD GLUCOSE      POCT Blood Glucose.: 104 mg/dL (2023 18:08)  POCT Blood Glucose.: 87 mg/dL (2023 12:38)  POCT Blood Glucose.: 75 mg/dL (2023 11:47)  POCT Blood Glucose.: 88 mg/dL (2023 00:56)        LIVER FUNCTIONS - ( 2023 11:30 )  Alb: x     / Pro: 4.6 g/dL / ALK PHOS: x     / ALT: x     / AST: x     / GGT: x           Creatinine Trend: 4.82<--, 4.63<--, 4.94<--, 5.22<--, 5.31<--, 5.23<--  I&O's Summary    2023 07:  -  2023 07:00  --------------------------------------------------------  IN: 2405.8 mL / OUT: 1700 mL / NET: 705.8 mL    2023 07:01  -  2023 23:21  --------------------------------------------------------  IN: 443 mL / OUT: 866 mL / NET: -423 mL            .Sputum Sputum  06-10 @ 10:23   Normal Respiratory Ivelisse present  --    Few polymorphonuclear leukocytes per low power field  Few Squamous epithelial cells per low power field  Rare Yeast like cells per oil power field  Few Gram Variable Rods per oil power field      .Blood Blood-Peripheral  06-10 @ 04:42   No growth to date.  --  --      .Blood Blood-Peripheral  06-10 @ 03:40   No growth to date.  --  --      .Blood Blood   @ 10:52   No Growth Final  --  --      .Blood Blood   @ 10:46   No Growth Final  --  --      .Blood Blood-Peripheral   @ 21:09   No Growth Final  --  Blood Culture PCR  Pseudomonas aeruginosa      Clean Catch Clean Catch (Midstream)   @ 21:02   >=3 organisms. Probable collection contamination.  --  --          MEDICATIONS:    MEDICATIONS  (STANDING):  albuterol    90 MICROgram(s) HFA Inhaler 2 Puff(s) Inhalation every 6 hours  chlorhexidine 2% Cloths 1 Application(s) Topical <User Schedule>  dorzolamide 2% Ophthalmic Solution 1 Drop(s) Right EYE <User Schedule>  metoclopramide Injectable 10 milliGRAM(s) IV Push every 6 hours  nystatin Powder 1 Application(s) Topical two times a day  pantoprazole  Injectable 40 milliGRAM(s) IV Push every 24 hours  sucralfate suspension 1 Gram(s) Oral four times a day  valproate sodium  IVPB 500 milliGRAM(s) IV Intermittent two times a day  vitamin A &amp; D Ointment 1 Application(s) Topical at bedtime      MEDICATIONS  (PRN):  ondansetron Injectable 4 milliGRAM(s) IV Push every 8 hours PRN Nausea and/or Vomiting  sodium chloride 0.9% lock flush 10 milliLiter(s) IV Push every 1 hour PRN Pre/post blood products, medications, blood draw, and to maintain line patency      REVIEW OF SYSTEMS:                           ALL ROS DONE [ X   ]    CONSTITUTIONAL:  LETHARGIC [   ], FEVER [   ], UNRESPONSIVE [   ]  CVS:  CP  [   ], SOB, [   ], PALPITATIONS [   ], DIZZYNESS [   ]  RS: COUGH [   ], SPUTUM [   ]  GI: ABDOMINAL PAIN [   ], NAUSEA [   ], VOMITINGS [   ], DIARRHEA [   ], CONSTIPATION [   ]  :  DYSURIA [   ], NOCTURIA [   ], INCREASED FREQUENCY [   ], DRIBLING [   ],  SKELETAL: PAINFUL JOINTS [   ], SWOLLEN JOINTS [   ], NECK ACHE [   ], LOW BACK ACHE [   ],  SKIN : ULCERS [   ], RASH [   ], ITCHING [   ]  CNS: HEAD ACHE [   ], DOUBLE VISION [   ], BLURRED VISION [   ], AMS / CONFUSION [   ], SEIZURES [   ], WEAKNESS [   ],TINGLING / NUMBNESS [   ]    PHYSICAL EXAMINATION:    GENERAL APPEARANCE: NO DISTRESS  HEENT:  NO PALLOR, NO  JVD,  NO   NODES, NECK SUPPLE  CVS: S1 +, S2 +,   RS: AEEB,  OCCASIONAL  RALES +,   NO RONCHI  ABD: SOFT, NT, NO, BS +  EXT: PE+  SKIN: WARM,   SKELETAL:  ROM REDUCED AT CERVICAL & LS SPINE  CNS:  AAO X 2-3      RADIOLOGY :    RADIOLOGY AND READINGS REVIEWED    ASSESSMENT :       PLAN:  HPI:  62 y/o M, A&Ox3, bedbound, chronic daley catheter with CKD (baseline creat 3.8), COPD, CHFpEF, BPH, PVD, Lymphedema, decubitus ulcers (Sacrum/gluteal) , Anemia, poly-neuropathy, poly-arthritis, hypothyroidism, seizures and Bipolar disorder was BIB EMS from [place] for abdominal pain, NV. Mr. Rhoades states that he developed acute onset sharp abdominal pain 4 days ago a/w "brown" emesis (witnessed by EMS). Unable to tolerate food/drink x 4 days. Having normal bowel movements, last this morning . Denies any fevers. NH paperwork states concern for obstruction.  Patient endorses BLOODY EMESIS x 4 days upon further questioning. (29 May 2023 01:36)      # [] - RRT CALLED FOR HYPOTENSION, FEVER AND ANEMIA - PATIENT GIVEN PRBC TRANSFUSION, VANCOMYCIN ADDED, TRANSFERRED FROM SCU TO ICU    # RESOLVING SEPSIS S/T P.AERUGINOSA BACTEREMIA + COMPLICATED UTI    - ON CEFEPIME, BCX [P. AERUGINOSA] AND UCX [ > 3 ORGANISMS], REPEAT BCX - NGTD  - ID CONSULT  - CRITICAL CARE EVALUATION IN PROGRESS    - PLACED ON VASOPRESSORS  - VANCOMYCIN + CEFEPIME, F/U REPEAT BCX    # RESOLVING GI BLEED  # ESOPHAGITIS  # ANEMIA OF CHRONIC DISEASE   # THROMBOCYTOPENIA  - MONITOR HGB, TRANSFUSION THRESHOLD HGB < 8  - TREND PLT  - S/P EGD - ULCERATIVE ESOPHAGITIS  - PPI BID  - CARAFATE QID  - ADVANCING DIET PER GI RECOMMENDATION   - GI CONSULT  - HEME/ONC CONSULT    - [] - EPISODE OF NAUSEA/VOMITING - MONITORING CLOSELY, PRN ANTIEMETICS    - [6/10] - GIVEN PRBC TRANSFUSION, PLANNED FOR 1 MORE UNIT OF PRBC, PLT AND FFP, DEPAKOTE STOPPED GIVEN THROMBOCYTOPENIA    - [] - PLANNED FOR POSSIBLE EGD    -  - REPEAT EGD - IMPROVING ESOPHAGITIS, SHALLOW DIVERTICULUM IN DISTAL ESOPHAGUS, SINGLE INFLAMMATORY NODULE IN ANTRUM    # ANEMIA DUE TO GI BLEED, ANEMIA OF CKD     # CHRONIC HYPOXIC RESPIRATORY FAILURE S/T UNDERLYING COPD  - ON PRN O2  - CRITICAL CARE EVALUATION IN PROGRESS    # THROMBOCYTOPENIA  - W/UP IN PROGRESS  - HEME/ONC CONSULT    # AMS DUE TO ACUTE METABOLIC ENCEPHALOPATHY    # SHEYLA ON CKD STAGE 4  - WORSENING  # CHRONIC DALEY, BPH  - DALEY   - STRICT IS AND OS  - AVOID NEPHROTOXIC AGENTS  - MONITOR CR  - NEPHROLOGY CONSULT    - [] - URINARY RETENTION OVERNIGHT - DALEY PLACED    # AMBULATORY DYSFUNCTION, IMPAIRED GAIT DUE TO GENERALIZED MUSCLE WEAKNESS, CERVICAL & LS SPINAL STENOSIS, POLYARTRHITIS & PERIPHERAL NEUROPATHY. OP  - FALL PRECAUTIONS    # DYSPHAGIA DUE TO METABOLIC ENCEPHALOPATHY - ON MODIFIED DIET PER SWALLOW EVAL     # HX OF SEIZURE DX  - ON VALPROIC ACID    # HYPOTHYROIDISM - ON LEVOTHYROXINE    # PRESSURE ULCERS  - PATIENT IS HIGH RISK FOR PRESSURE ULCERS DESPITE PREVENTIVE MEASURES IN PLACE  - WOUND CARE CONSULT    # GI PPX

## 2023-06-13 NOTE — PROGRESS NOTE ADULT - PROBLEM SELECTOR PLAN 5
64 y/o M, A&Ox3, bedbound, chronic daley catheter with CKD (baseline creat 3.8), COPD, CHFpEF, BPH, PVD, Lymphedema, decubitus ulcers (Sacrum/gluteal) , Anemia, poly-neuropathy, poly-arthritis, hypothyroidism, seizures and Bipolar disorder was BIB EMS from [place] for abdominal pain, NV.   Admitted to ICU for Hypotension likely from hemorraghic shock w complicated UTI and esophagitis.  Pt is high risk for complications and decline on this hospitalizations.  Pt may be hospice appropriate if his functional abilities does not improve.  Pt remains a FULL CODE.   Please see discussion noted above in GOC conversation

## 2023-06-13 NOTE — PROGRESS NOTE ADULT - ASSESSMENT
Patient is a 63M with a PMHx of chronic daley, obesity, HTN, HLD, PVD, seizures, CKD stage IV, anemia, BPH, lymphedema, hypothyroidism, HFpEF, ? COPD, decubitus ulcer, polyneuropathy, polyarthritis, bipolar disorder, who presented to the ED with abd pain, n/v, and brown emesis. GI was consulted for GIB.     #Nausea  #Esophagitis  #GIB  #Anemia  #Hypoalbuminemia  #Brown emesis (resolved)  Patient presented with brown emesis and food intolerance x 4 days, currently in the ICU for urosepsis.   GI was initially consulted on 5/30, s/p EGD on the same day, notable for ulcerative esophagitis, recommended PO Protonix 40mg BID x 2 weeks then daily as well as carafate 10cc four times daily x 2 weeks, and diet as tolerated.   GI was re-engaged on 6/12 for anemia and ? repeat EGD. repeat EGD performed 6/12 notable for improving esophagitis where 95% healed, inflammatory  nodule in antrum, shallow diverticulum seen towards distal esophagus, no active bleeding noted. Patient continues to endorse nausea, currently NPO due to PO intolerance. IV Zofran with mild effect. Discussed trialing Carafate suspension and maalox. Can consider IV Haldol 1mg vs IV Ativan 0.5-1mg vs IV diphenhydramine 25mg vs scopolamine 1.5mg transdermal patch q3days if nausea persists, recommend EKG to monitor QTc. Hgb improved 9.0 this morning, post EGD yesterday Hgb 9.3. No overt signs of bleeding.     	- s/p EGD 5/30: ulcerative esophagitis  	- s/p repeat EGD 6/12: 95% prio esophagitis healed, inflammatory nodule in antrum, shallow diverticulum seen towards distal esophagus  	- Recommend increasing IV Protonix 40mg BID due to persistent nausea, OK to transition to IV/PO 40mg daily once nausea resolves and upon discharge  	- Diet as tolerated  	- Continue PO carafate 10cc four times daily x 2 weeks  	- Trial Maalox 30cc q4h   	- PRN IV Zofran 4-8mg q8h for nausea (prev on Reglan with minimal effect)  	- PRN EKG to monitor QTc  	- Maintain active T&S, 2 large bore peripheral IVs, transfuse for goal hgb >7 or if symptomatic  	- Trend H/H     This note and its recommendations herein are preliminary until such time as cosigned by an attending.    GI will sign off at this time.  Thank you for involving us in the care of Mr. Bear Rhoades.  Please re-consult GI PRN.

## 2023-06-13 NOTE — PROGRESS NOTE ADULT - PROBLEM SELECTOR PLAN 2
Likely SHEYLA on CKD 2/2 septic shock and dehydration.  Scr improving.  Nephrology following     Avoid nephrotoxic medications/NSAIDs

## 2023-06-13 NOTE — PHYSICAL THERAPY INITIAL EVALUATION ADULT - PERTINENT HX OF CURRENT PROBLEM, REHAB EVAL
Patient was brought to ED from [facility] with abdominal pain
Patient for re-evaluation as patient was being transferred from  to ICU for sepsis

## 2023-06-13 NOTE — PHYSICAL THERAPY INITIAL EVALUATION ADULT - PLANNED THERAPY INTERVENTIONS, PT EVAL
balance training/bed mobility training/gait training/ROM/strengthening/transfer training
balance training/bed mobility training/gait training/ROM/strengthening/stretching/transfer training

## 2023-06-13 NOTE — PHYSICAL THERAPY INITIAL EVALUATION ADULT - IMPAIRMENTS FOUND, PT EVAL
aerobic capacity/endurance/cognitive impairment/gait, locomotion, and balance/muscle strength/poor safety awareness/ROM
aerobic capacity/endurance/gait, locomotion, and balance/muscle strength/poor safety awareness/ROM

## 2023-06-13 NOTE — PHYSICAL THERAPY INITIAL EVALUATION ADULT - DIAGNOSIS, PT EVAL
Patient presented with generalized weakness, difficulty with performing AROM on B LE'S(R LE weaker than L LE), impaired sitting tolerance and unable to perform OOB transfers at this time
Patient presented with pain on R LE, weakness on B LE'S, impairments in sitting balance and was unable to perform OOB transfers at this time

## 2023-06-13 NOTE — PROGRESS NOTE ADULT - PROBLEM SELECTOR PLAN 4
Ambulatory w walker prior to admission.  Currently  bedbound. Total care.  high risk for skin failure.  Supportive care  Freq positioning    Pt rec's TG

## 2023-06-13 NOTE — PROGRESS NOTE ADULT - ASSESSMENT
complete note to follow    #VTE Prophylaxis   Assessment and Plan:   · Assessment	    #Normocytic Anemia  #Thrombocytopenia  GNR sepsis, hypotension d/t UTI, esophagitis  SHEYLA on CKD  repeat BCx now (-)  Hgb=9.0 stable  no active bleeding/ecchymosis  LFT's nl  Plt=88k-->81k stable  elevated PT/PTT with borderline fibrinogen and normal D-dimer not c/w DIC  PF4 (-)  peripheral smear no schistocytes  hemolysis (-)  iron panel c/w ACD, hyperferritinemia likely d/t reactive process  Low TSH  Rec's:  -etiology thrombocytopenia multi-factorial GNR sepsis/Esophagitis/on valproate sodium  -etiology anemia SHEYLA on CKD, infxn, nutritional, drug induced  -H/H stable, EGD resolving esophagitis, no bleeding, cont PPI  -Platelets stable  -check Mixing study, hepatitis panel, repeat ferritin, check B/12, folate, SPEP, ROSANNE, FABRICE, RF, pending  -Check fibrinogen, D-dimer daily  -Cryo indicated if fibrinogen <100   -PRBC transfusion if Hgb <7.0 or symptomatic  -plan for repeat EGD today  -GI on board, pt with esophagitis, on PPI  -Transfuse SDP if Plt <20k in setting of sepsis  -avoid non-essential meds that can exacerbate plt drop (i.e. H2 blocker)  -monitor CBC daily  -always check post SDP transfusion plt level within 1 hour to confirm adequate response    Thank you for the referral. Will continue to monitor the patient.  Please call with any questions [phone]  Above reviewed with Attending Dr. Derrell POON/[facility]  [address] [facility], [address], [place], [place]  [phone]  *Note not finalized until signed by Attending Physician

## 2023-06-13 NOTE — CHART NOTE - NSCHARTNOTEFT_GEN_A_CORE
Patient seen and examined at bedside for removal of Left IJ central line. Chart and labs reviewed prior to removal, no contraindication to procedure. Area cleaned with chlorhexidine swabs and suture removal kit used to remove sutures holding central line in place using aseptic technique.  Patient then placed in reverse Trendelenburg position, asked to hold breath/hum, and then Left IJ central line removed.  Pressure applied for 5-10 minutes with gauze. No signs of active bleeding noted. No hematoma formation noted. Tegaderm and gauze/tape used to create pressure dressing to maintain pressure on central line site. Patient tolerated well without complications.     Discussed procedure with RN who will monitor and notify the provider for bleeding, hematoma formation, or other procedural complications.

## 2023-06-13 NOTE — ADVANCED PRACTICE NURSE CONSULT - ASSESSMENT
This is a 63yr old male patient admitted for Vomiting, presenting with the following:  -There is a Stage 1 Pressure Injury to the Bilateral Heels, as evident by non-blanchable erythema  -There is a healed wound to the R. Gluteus  -There is evidence of Moisture Associated Skin Damage to the Gluteal Fold, Bilateral Gluteus, and Perianal 
This is a 63yr old male patient admitted for Vomiting, presenting with the following:  -There is a Stage 1 Pressure Injury to the Bilateral Heels, Bilateral Gluteus, and Coccyx areas; as evident by non-blanchable erythema  -There is a Stage 2 Pressure Injury to the R. Gluteus (0.3cm x 0.3cm x 0.1cm) with pink tissue and scant drainage

## 2023-06-13 NOTE — PROGRESS NOTE ADULT - PROBLEM SELECTOR PLAN 1
Likely toxic metabolic encephalopathy with hx of bipolar disorder, seizures w hypoactive delirium.  c/w DepaCon  Pt Aox2, lethargic  and confused.  Lacks insight as to his clinical condition.  Management per ICU      Pt remains a FULL CODE

## 2023-06-13 NOTE — CHART NOTE - NSCHARTNOTEFT_GEN_A_CORE
Assessment:   Patient is a 63y old  Male who presents with a chief complaint of sepsis (2023 13:04). Pt transferred to ICU 6/9 PM. Discussed on ICU IDR  and , pt w/ persistent vomiting, made NPO.GI note in progress noted.      Diet Prescription: Diet, NPO:   Except Medications (23 @ 16:06)      Daily     Daily Weight in k.3 (2023 07:30)  Weight in k.6 (10 Carlin 2023 07:00)    % Weight Change: 2+ generalized, 3+ B/L hand edema    Pertinent Medications: MEDICATIONS  (STANDING):  albuterol    90 MICROgram(s) HFA Inhaler 2 Puff(s) Inhalation every 6 hours  chlorhexidine 2% Cloths 1 Application(s) Topical <User Schedule>  dorzolamide 2% Ophthalmic Solution 1 Drop(s) Right EYE <User Schedule>  metoclopramide Injectable 10 milliGRAM(s) IV Push every 6 hours  norepinephrine Infusion 0.05 MICROgram(s)/kG/Min (3.95 mL/Hr) IV Continuous <Continuous>  nystatin Powder 1 Application(s) Topical two times a day  pantoprazole  Injectable 40 milliGRAM(s) IV Push every 24 hours  sucralfate suspension 1 Gram(s) Oral four times a day  valproate sodium  IVPB 500 milliGRAM(s) IV Intermittent two times a day  vitamin A &amp; D Ointment 1 Application(s) Topical at bedtime    MEDICATIONS  (PRN):  ondansetron Injectable 4 milliGRAM(s) IV Push every 8 hours PRN Nausea and/or Vomiting  sodium chloride 0.9% lock flush 10 milliLiter(s) IV Push every 1 hour PRN Pre/post blood products, medications, blood draw, and to maintain line patency    Pertinent Labs:  Na147 mmol/L<H> Glu 90 mg/dL K+ 4.1 mmol/L Cr  4.82 mg/dL<H> BUN 50 mg/dL<H>  Phos 3.1 mg/dL  Alb 1.8 g/dL<L>     CAPILLARY BLOOD GLUCOSE      POCT Blood Glucose.: 87 mg/dL (2023 12:38)  POCT Blood Glucose.: 75 mg/dL (2023 11:47)  POCT Blood Glucose.: 88 mg/dL (2023 00:56)      Previous Nutrition Diagnosis:   [ ] Altered GI function  [x ]Inadequate Oral Intake [ ] Swallowing Difficulty   [ ] Altered nutrition related labs [ ] Increased Nutrient Needs [ ] Overweight/Obesity   [ ] Unintended Weight Loss [ ] Food & Nutrition Related Knowledge Deficit [ ] Malnutrition   [ ] Other:     Nutrition Diagnosis is [x ] ongoing  [ ] resolved [ ] not applicable     New Nutrition Diagnosis: [x ] PCM (at least moderate) related to altered GI fx ongoing as evidenced by >50% needs met> 5 days, 2+generalized, 3+ B/L hand edema      Interventions:   Recommend  [ ] Change Diet To:  [ ] Nutrition Supplement  [ ] Nutrition Support  [x ] Other: Diet advancement per MD, if ongoing inability to tolerate PO, consider alternate nutrition. MD to monitor. RD available.     Monitoring and Evaluation:   [x ] PO intake [ x ] Tolerance to diet prescription [ x ] weights [ x ] labs[ x ] follow up per protocol  [ ] other:

## 2023-06-13 NOTE — PHYSICAL THERAPY INITIAL EVALUATION ADULT - ACTIVE RANGE OF MOTION EXAMINATION, REHAB EVAL
R knee AROM limited(chronic)/deficits as listed below
R LE weaker than L LE and is having difficulty with knee flexion past mid range/deficits as listed below

## 2023-06-13 NOTE — PROGRESS NOTE ADULT - SUBJECTIVE AND OBJECTIVE BOX
Blake Olivier MD  Nephrology      GAYLA PEARCE  63y  Patient is a 63y old  Male who presents with a chief complaint of sepsis (2023 11:40)    HPI:  Seen and examined. he has no new complaints, but has poor appetite. No nausea.  He is a patient with multiple medical problems who is followed for SHEYLA on CKD.    HEALTH ISSUES - PROBLEM Dx:  GIB (gastrointestinal bleeding)    Sepsis    Anemia    Hypothyroid    CKD (chronic kidney disease)    BPH (benign prostatic hyperplasia)    Bipolar disorder    Advance care planning    Seizure    Esophagitis    Hypokalemia    Ambulatory dysfunction    Prophylactic measure    Electrolyte depletion    Acute encephalopathy    Acute kidney injury superimposed on CKD    Ulcerative esophagitis    Decubitus ulcers    Functional quadriplegia    Moderate protein-calorie malnutrition    Encounter for palliative care    Chronic heart failure with preserved ejection fraction (HFpEF)    Sepsis with acute hypoxic respiratory failure    Protein calorie malnutrition    Palliative care encounter          MEDICATIONS  (STANDING):  albuterol    90 MICROgram(s) HFA Inhaler 2 Puff(s) Inhalation every 6 hours  chlorhexidine 2% Cloths 1 Application(s) Topical <User Schedule>  dorzolamide 2% Ophthalmic Solution 1 Drop(s) Right EYE <User Schedule>  metoclopramide Injectable 10 milliGRAM(s) IV Push every 6 hours  norepinephrine Infusion 0.05 MICROgram(s)/kG/Min (3.95 mL/Hr) IV Continuous <Continuous>  nystatin Powder 1 Application(s) Topical two times a day  pantoprazole  Injectable 40 milliGRAM(s) IV Push every 24 hours  sucralfate suspension 1 Gram(s) Oral four times a day  valproate sodium  IVPB 500 milliGRAM(s) IV Intermittent two times a day  vitamin A &amp; D Ointment 1 Application(s) Topical at bedtime    MEDICATIONS  (PRN):  ondansetron Injectable 4 milliGRAM(s) IV Push every 8 hours PRN Nausea and/or Vomiting  sodium chloride 0.9% lock flush 10 milliLiter(s) IV Push every 1 hour PRN Pre/post blood products, medications, blood draw, and to maintain line patency    Vital Signs Last 24 Hrs  T(C): 36 (2023 08:00), Max: 36.5 (2023 17:01)  T(F): 96.8 (2023 08:00), Max: 97.7 (2023 17:01)  HR: 127 (2023 11:15) (57 - 127)  BP: 115/57 (2023 11:15) (90/51 - 128/66)  BP(mean): 70 (2023 11:15) (60 - 105)  RR: 26 (2023 11:15) (15 - 34)  SpO2: 93% (2023 11:15) (90% - 100%)    Parameters below as of 2023 07:30  Patient On (Oxygen Delivery Method): room air      Daily     Daily Weight in k.3 (2023 07:30)    PHYSICAL EXAM:  Constitutional: He appears comfortable and not distressed. Not diaphoretic.    Neck:  The thyroid is normal. Trachea is midline.     Breasts: Normal examination.    Respiratory: The lungs are clear to auscultation. No dullness and expansion is normal.    Cardiovascular: S1 and S2 are normal. No mummurs, rubs or gallops are present.    Gastrointestinal: The abdomen is soft. No tenderness is present. No masses are present. Bowel sounds are normal.    Genitourinary: The bladder is not distended. No CVA tenderness is present.    Extremities: No edema is noted. No deformities are present.    Neurological: Cognition is normal. Tone, power and sensation are normal. Gait is steady.    Skin: No lesions are seen  or palpated.    Lymph Nodes: No lymphadenopathy is present.    Psychiatric: Flat affect.. No hallucinations or flight of ideas are noted.    I&O's Summary    2023 07:  -  2023 07:00  --------------------------------------------------------  IN: 2405.8 mL / OUT: 1700 mL / NET: 705.8 mL    2023 07:01  -  2023 13:06  --------------------------------------------------------  IN: 191.4 mL / OUT: 210 mL / NET: -18.6 mL                                9.0    7.71  )-----------( 81       ( 2023 03:10 )             27.0     06-13    144  |  116<H>  |  49<H>  ----------------------------<  93  3.7   |  19<L>  |  4.63<H>    Ca    8.9      2023 03:10  Phos  2.3     06-13  Mg     2.2     -13    TPro  4.7<L>  /  Alb  1.8<L>  /  TBili  0.3  /  DBili  x   /  AST  16  /  ALT  7<L>  /  AlkPhos  81  06-12

## 2023-06-13 NOTE — PROGRESS NOTE ADULT - PROBLEM SELECTOR PLAN 3
Clinical evidence indicates that the patient has Severe protein calorie malnutrition/ 3rd degree. Albumin 1.8.      In context of     Chronic Illness (>1 month)    Energy/Food intake <50% of estimated energy requirement >5 days  Weight loss: Moderate - severe (lbs lost recently)  Body Fat loss: Severe   (Cachexia, temporal wasting, contracted, muscle atrophy)  Muscle mass loss: Severe  (Skin failure/pressure ulcers)  Fluid Accumulation: Severe (Fluid overload, ascites, pleural effusions)   Strength: weakened severe (bedbound)    Recommend:   pleasure feeds as tolerated - aspiration precautions, careful hand-feeding, teaching to caregivers  nutritional supplements as tolerated, nutrition consult      Currently NPO

## 2023-06-13 NOTE — ADVANCED PRACTICE NURSE CONSULT - RECOMMEDATIONS
-Clean all affected areas with normal saline and apply skin prep to the surrounding skin  -Apply TRIAD Moisture Barrier Cream to the Gluteal Fold and Perianal areas b.i.d. PRN  -Moisturize the patients Bilateral Lower Extremities Daily PRN  -Elevate/float the patients heels using heel protectors and reposition the patient Q 2hrs using wedges or pillows

## 2023-06-13 NOTE — PROGRESS NOTE ADULT - SUBJECTIVE AND OBJECTIVE BOX
Time of Visit:  Patient seen and examined.     MEDICATIONS  (STANDING):  albuterol    90 MICROgram(s) HFA Inhaler 2 Puff(s) Inhalation every 6 hours  chlorhexidine 2% Cloths 1 Application(s) Topical <User Schedule>  dorzolamide 2% Ophthalmic Solution 1 Drop(s) Right EYE <User Schedule>  metoclopramide Injectable 10 milliGRAM(s) IV Push every 6 hours  nystatin Powder 1 Application(s) Topical two times a day  pantoprazole  Injectable 40 milliGRAM(s) IV Push every 24 hours  sucralfate suspension 1 Gram(s) Oral four times a day  valproate sodium  IVPB 500 milliGRAM(s) IV Intermittent two times a day  vitamin A &amp; D Ointment 1 Application(s) Topical at bedtime      MEDICATIONS  (PRN):  ondansetron Injectable 4 milliGRAM(s) IV Push every 8 hours PRN Nausea and/or Vomiting  sodium chloride 0.9% lock flush 10 milliLiter(s) IV Push every 1 hour PRN Pre/post blood products, medications, blood draw, and to maintain line patency       Medications up to date at time of exam.      PHYSICAL EXAMINATION:  Patient has no new complaints.  GENERAL: The patient is a well-developed, well-nourished, in no apparent distress.     Vital Signs Last 24 Hrs  T(C): 36.3 (13 Jun 2023 15:32), Max: 36.3 (13 Jun 2023 15:32)  T(F): 97.3 (13 Jun 2023 15:32), Max: 97.3 (13 Jun 2023 15:32)  HR: 108 (13 Jun 2023 16:00) (57 - 127)  BP: 94/62 (13 Jun 2023 16:00) (84/50 - 123/75)  BP(mean): 72 (13 Jun 2023 16:00) (58 - 95)  RR: 24 (13 Jun 2023 16:00) (15 - 34)  SpO2: 94% (13 Jun 2023 16:00) (90% - 100%)    Parameters below as of 13 Jun 2023 07:30  Patient On (Oxygen Delivery Method): room air       (if applicable)    Chest Tube (if applicable)    HEENT: Head is normocephalic and atraumatic. Extraocular muscles are intact. Mucous membranes are moist.     NECK: Supple, no palpable adenopathy.    LUNGS: Clear to auscultation, no wheezing, rales, or rhonchi.    HEART: Regular rate and rhythm without murmur.    ABDOMEN: Soft, nontender, and nondistended.  No hepatosplenomegaly is noted.    EXTREMITIES: Without any cyanosis, clubbing, rash, lesions or edema.    NEUROLOGIC: Awake     SKIN: Warm, dry, good turgor.      LABS:                        9.0    7.71  )-----------( 81       ( 13 Jun 2023 03:10 )             27.0     06-13    147<H>  |  118<H>  |  50<H>  ----------------------------<  90  4.1   |  19<L>  |  4.82<H>    Ca    9.4      13 Jun 2023 14:40  Phos  3.1     06-13  Mg     2.1     06-13    TPro  4.7<L>  /  Alb  1.8<L>  /  TBili  0.3  /  DBili  x   /  AST  16  /  ALT  7<L>  /  AlkPhos  81  06-12    PT/INR - ( 13 Jun 2023 03:10 )   PT: 18.1 sec;   INR: 1.51 ratio         PTT - ( 13 Jun 2023 03:10 )  PTT:44.5 sec          D-Dimer Assay, Quantitative: <150 ng/mL DDU (06-13-23 @ 11:30)            MICROBIOLOGY: (if applicable)    RADIOLOGY & ADDITIONAL STUDIES:  EKG:   CXR:  ECHO:    IMPRESSION: 63y Male PAST MEDICAL & SURGICAL HISTORY:  Hypothyroid      Hyperlipidemia      Ambulatory dysfunction      Anemia      History of BPH      CKD (chronic kidney disease)      Chronic obstructive pulmonary disease (COPD)      Bipolar disorder      HLD (hyperlipidemia)      BPH (benign prostatic hyperplasia)      Chronic diastolic congestive heart failure      Lymphedema      Chronic leg pain      No significant past surgical history       p/w           IMP: This is a 63 year old man , from [place] [id number], ambulates with walker , chronic FC, h/o obesity, HTN, HLD, PVD, Seizures, CKD4 (base SCr 3.8), anemia, BPH, Lymphedema, hypothyroidism, HFpEF, possible COPD, decubitus ulcer (sacrum/gluteal), polyneuropathy, polyarthritis, bipolar disorder. Patient presented to ED for abdominal pain, nausea and vomiting brown emesis. Patient admitted to ICU for Hypotension likely from Sepsis secondary to complicated UTI and esophagitis. CT head was negative for acute changes. CT abd showed circumferential wall thickening of distal esophagus. GI consulted recommending endoscopy which took place on 5/30 with findings of ulcerative esophagitis. On PPI.   Blood cultures from 5/28 + for P. aeruginasa. Ucx likely contaminant. On cefepime.  Patient with pressure injury. Wound care consult. Nephrology following for SHEYLA on CKD.   06/01: Repeat blood cx pending. Advance diet as tolerated. PT reccs TG (5/30). Plan to return to [facility] once repeat Bcx come back. C/w Cefepime for complicated UTI and P. Aeruginosa bacteriemia. Afebrile. BP soft this AM.    06/02: Repeat blood cx pending. On Cefepime. PT reccs TG. Dispo ([facility]). Hemodynamically stable. Plan for OOB to chair.   06/03: Repeat Bcx from 6/1 NGTD. Afebrile. On Cefepime. ID following. Hemodynamically stable. Plan to advance diet to full liquids.   6/4: Patient with tachycardia secondary to frustration from not being able to be discharge. Emotional support given, encouraged to verbalize concerns, explained in full the plan of care. Patient VS became stable, will hold off on EKG as isolated event.   06/07: Urinary retention with Viera insertion overnight. No acceptance from chosen facilities. Bcx NGTD. Plan to d/c on Cipro and Maxepime for a total of 14 days per ID. SCr increased to 4.80 from 4.48. Hypokalemic this AM. Replacement in progress. Titrating O2.   06/08: Costa in WBC. Afebrile. Soft BP yesterday with SBP in the 90s.  C/o SOB this AM. See assessment above. CXR ordered and BNP. Patient refuses to participate in PT. Will attempt again today. Plan to D/C to [facility] when optimized.   06/09: Lethargic. Thrombocytopenia Discontinued Depakote. Discussed with psych as it could be causing drop on plt. HIT panel negative. Hem/Onc consulted. Soft BP. Orthostatic hypotension. Midodrine started. IVF. Electrolyte derangements Replacement in progress. CXR shows possible infiltrate RLL. on 2L NC. Afebrile. Worsening SHEYLA. Discussed wit hnephro. No plan for HD at this time. Will continue to monitor. Patient has Viera and making urine.   Transferred to  ICU for Hypotension likely from Sepsis secondary to complicated UTI and esophagitis. Noted to have low H/H but no overt sign of bleeding s/p 1 unit PRBC. AMS due to encephalopathy       Sugg:    - O2 supp as needed   - No sedation   - AMS due to encephalopathy improving   - Hold depakote due to thrombocytopenia   - Heme eval noted .. not DIC   - Pat has cough due to oral secretions   - Prokinetic for GI   - Asp precaution        discussed with icu team

## 2023-06-13 NOTE — PROGRESS NOTE ADULT - ASSESSMENT
SHEYLA: Likely ATN on CKD after shock and bleeding. His Cr is improving today..  On Vasopressors and Urine Output is > 50 mL/hour.  - Follow up BMP.  - MAP > 65.  - Maintain daley for now.  - HD if no improvement.  - No need for urgent HD at this time.      Metabolic Acidosis:  Non AG, likely Distal RTA.  Associated with relatively low K.  - Monitor BMP.    CKD  4  -Renal diet.  - Avoid Nephrotoxins.  - follow up BMP.    CKD Mineral and Bone Disease:  He has Hypophosphatemia now.  Off binders. Likely Renal wasting or poor GI absorption.  - Supplement PO4.  - Vit D levels.  - His corrected Ca was 12.8 so he may have Primary Hyperparathyroidism, and this would explain his low PO4.    Anemia of CKD:  - Transfuse as needed for Hg < 7.    Hypokalemia:  Likely Distal RTA. Mg is wnl.  - Monitor K and Mg.  - Supplement as needed.

## 2023-06-13 NOTE — PHYSICAL THERAPY INITIAL EVALUATION ADULT - LEVEL OF INDEPENDENCE: SUPINE/SIT, REHAB EVAL
moderate assist (50% patients effort)
Patient was unable to perform supine to sit transfers; however, bed was set to chair position and patient was able to tolerate the same/unable to perform

## 2023-06-14 NOTE — SWALLOW BEDSIDE ASSESSMENT ADULT - SWALLOW EVAL: FUNCTIONAL LEVEL AT TIME OF EVAL
Dependent
Pt able to self feed hand held food, requires assist w/ spearing/scooping/cutting & reaching.

## 2023-06-14 NOTE — SWALLOW BEDSIDE ASSESSMENT ADULT - PHARYNGEAL PHASE
Delayed pharyngeal swallow/Decreased laryngeal elevation/Delayed cough post oral intake/Multiple swallows Delayed pharyngeal swallow/Decreased laryngeal elevation/Cough post oral intake/Multiple swallows Delayed pharyngeal swallow/Decreased laryngeal elevation/Multiple swallows Delayed pharyngeal swallow/Decreased laryngeal elevation Delayed pharyngeal swallow/Decreased laryngeal elevation/Cough post oral intake/Delayed cough post oral intake/Multiple swallows

## 2023-06-14 NOTE — PROGRESS NOTE ADULT - SUBJECTIVE AND OBJECTIVE BOX
Patient is a 63y old  Male who presents with a chief complaint of sepsis       SUBJECTIVE / OVERNIGHT EVENTS:      MEDICATIONS  (STANDING):  albuterol    90 MICROgram(s) HFA Inhaler 2 Puff(s) Inhalation every 6 hours  buPROPion XL (24-Hour) . 150 milliGRAM(s) Oral daily  chlorhexidine 2% Cloths 1 Application(s) Topical <User Schedule>  divalproex  milliGRAM(s) Oral two times a day  dorzolamide 2% Ophthalmic Solution 1 Drop(s) Right EYE <User Schedule>  finasteride 5 milliGRAM(s) Oral daily  heparin   Injectable 5000 Unit(s) SubCutaneous every 8 hours  metoclopramide Injectable 10 milliGRAM(s) IV Push every 6 hours  nystatin Powder 1 Application(s) Topical two times a day  pantoprazole  Injectable 40 milliGRAM(s) IV Push every 24 hours  sucralfate suspension 1 Gram(s) Oral four times a day  tamsulosin 0.4 milliGRAM(s) Oral at bedtime  traZODone 50 milliGRAM(s) Oral at bedtime  vitamin A &amp; D Ointment 1 Application(s) Topical at bedtime    MEDICATIONS  (PRN):  ondansetron Injectable 4 milliGRAM(s) IV Push every 8 hours PRN Nausea and/or Vomiting  sodium chloride 0.9% lock flush 10 milliLiter(s) IV Push every 1 hour PRN Pre/post blood products, medications, blood draw, and to maintain line patency    CAPILLARY BLOOD GLUCOSE      POCT Blood Glucose.: 101 mg/dL (14 Jun 2023 11:14)  POCT Blood Glucose.: 104 mg/dL (13 Jun 2023 18:08)    I&O's Summary    13 Jun 2023 07:01  -  14 Jun 2023 07:00  --------------------------------------------------------  IN: 503 mL / OUT: 1274 mL / NET: -771 mL        PHYSICAL EXAM:  Vital Signs Last 24 Hrs  T(C): 35.6 (14 Jun 2023 08:00), Max: 36.5 (13 Jun 2023 20:00)  T(F): 96 (14 Jun 2023 08:00), Max: 97.7 (13 Jun 2023 20:00)  HR: 101 (14 Jun 2023 09:00) (88 - 126)  BP: 102/63 (14 Jun 2023 09:00) (84/53 - 113/89)  BP(mean): 74 (14 Jun 2023 09:00) (63 - 97)  RR: 27 (14 Jun 2023 09:00) (12 - 38)  SpO2: 99% (14 Jun 2023 09:00) (94% - 100%)    Parameters below as of 14 Jun 2023 06:00  Patient On (Oxygen Delivery Method): room air        LABS:                        8.6    6.53  )-----------( 71       ( 14 Jun 2023 03:12 )             26.8     06-14    147<H>  |  119<H>  |  50<H>  ----------------------------<  109<H>  4.0   |  16<L>  |  5.15<H>    Ca    9.4      14 Jun 2023 05:31  Phos  3.4     06-14  Mg     2.2     06-14    TPro  4.3<L>  /  Alb  x   /  TBili  x   /  DBili  x   /  AST  x   /  ALT  x   /  AlkPhos  x   06-14    PT/INR - ( 14 Jun 2023 03:12 )   PT: 20.3 sec;   INR: 1.70 ratio         PTT - ( 14 Jun 2023 03:12 )  PTT:43.7 sec          SARS-CoV-2: NotDetec (10 Carlin 2023 10:35)  COVID-19 PCR: NotDetec (07 Jun 2023 06:43)  COVID-19 PCR: NotDetec (04 Jun 2023 18:40)  SARS-CoV-2: NotDetec (28 May 2023 21:02)         Patient is a 63y old  Male who presents with a chief complaint of sepsis       SUBJECTIVE / OVERNIGHT EVENTS: events noted. Pt c/o ongoing Nausea      MEDICATIONS  (STANDING):  albuterol    90 MICROgram(s) HFA Inhaler 2 Puff(s) Inhalation every 6 hours  buPROPion XL (24-Hour) . 150 milliGRAM(s) Oral daily  chlorhexidine 2% Cloths 1 Application(s) Topical <User Schedule>  divalproex  milliGRAM(s) Oral two times a day  dorzolamide 2% Ophthalmic Solution 1 Drop(s) Right EYE <User Schedule>  finasteride 5 milliGRAM(s) Oral daily  heparin   Injectable 5000 Unit(s) SubCutaneous every 8 hours  metoclopramide Injectable 10 milliGRAM(s) IV Push every 6 hours  nystatin Powder 1 Application(s) Topical two times a day  pantoprazole  Injectable 40 milliGRAM(s) IV Push every 24 hours  sucralfate suspension 1 Gram(s) Oral four times a day  tamsulosin 0.4 milliGRAM(s) Oral at bedtime  traZODone 50 milliGRAM(s) Oral at bedtime  vitamin A &amp; D Ointment 1 Application(s) Topical at bedtime    MEDICATIONS  (PRN):  ondansetron Injectable 4 milliGRAM(s) IV Push every 8 hours PRN Nausea and/or Vomiting  sodium chloride 0.9% lock flush 10 milliLiter(s) IV Push every 1 hour PRN Pre/post blood products, medications, blood draw, and to maintain line patency    CAPILLARY BLOOD GLUCOSE      POCT Blood Glucose.: 101 mg/dL (14 Jun 2023 11:14)  POCT Blood Glucose.: 104 mg/dL (13 Jun 2023 18:08)    I&O's Summary    13 Jun 2023 07:01  -  14 Jun 2023 07:00  --------------------------------------------------------  IN: 503 mL / OUT: 1274 mL / NET: -771 mL        PHYSICAL EXAM:  Vital Signs Last 24 Hrs  T(C): 35.6 (14 Jun 2023 08:00), Max: 36.5 (13 Jun 2023 20:00)  T(F): 96 (14 Jun 2023 08:00), Max: 97.7 (13 Jun 2023 20:00)  HR: 101 (14 Jun 2023 09:00) (88 - 126)  BP: 102/63 (14 Jun 2023 09:00) (84/53 - 113/89)  BP(mean): 74 (14 Jun 2023 09:00) (63 - 97)  RR: 27 (14 Jun 2023 09:00) (12 - 38)  SpO2: 99% (14 Jun 2023 09:00) (94% - 100%)    Parameters below as of 14 Jun 2023 06:00  Patient On (Oxygen Delivery Method): room air      GEN: NAD; A and O x 2, ill-appearing, pallor, lethargic  LUNGS: scattered rhonchi  HEART: S1 S2  ABDOMEN: soft, non-tender, non-distended, + BS  EXTREMITIES: B/LE edema, hyperpigmentation      LABS:                        8.6    6.53  )-----------( 71       ( 14 Jun 2023 03:12 )             26.8     06-14    147<H>  |  119<H>  |  50<H>  ----------------------------<  109<H>  4.0   |  16<L>  |  5.15<H>    Ca    9.4      14 Jun 2023 05:31  Phos  3.4     06-14  Mg     2.2     06-14    TPro  4.3<L>  /  Alb  x   /  TBili  x   /  DBili  x   /  AST  x   /  ALT  x   /  AlkPhos  x   06-14    PT/INR - ( 14 Jun 2023 03:12 )   PT: 20.3 sec;   INR: 1.70 ratio         PTT - ( 14 Jun 2023 03:12 )  PTT:43.7 sec          SARS-CoV-2: NotDetec (10 Carlin 2023 10:35)  COVID-19 PCR: NotDetec (07 Jun 2023 06:43)  COVID-19 PCR: NotDetec (04 Jun 2023 18:40)  SARS-CoV-2: NotDetec (28 May 2023 21:02)

## 2023-06-14 NOTE — SWALLOW BEDSIDE ASSESSMENT ADULT - SWALLOW EVAL: RECOMMENDED DIET
Unable to make diet recommendations at this time 2/2 pt refusal to take sufficient PO trials. Therefore, will defer PO diet to medical team at this time. Consider full liquid diet if nausea continues.
Mildly Thick Liquids

## 2023-06-14 NOTE — SWALLOW BEDSIDE ASSESSMENT ADULT - SPECIFY REASON(S)
Subjective assessment of current swallow function
Subjective re-assessment of current swallow function

## 2023-06-14 NOTE — PROGRESS NOTE ADULT - SUBJECTIVE AND OBJECTIVE BOX
INTERVAL HPI/OVERNIGHT EVENTS:       PRESSORS: [ ] YES [ ] NO  WHICH:    ANTIBIOTICS:                  DATE STARTED:  ANTIBIOTICS:                  DATE STARTED:    Antimicrobial:    Cardiovascular:    Pulmonary:  albuterol    90 MICROgram(s) HFA Inhaler 2 Puff(s) Inhalation every 6 hours    Hematalogic:    Other:  chlorhexidine 2% Cloths 1 Application(s) Topical <User Schedule>  dorzolamide 2% Ophthalmic Solution 1 Drop(s) Right EYE <User Schedule>  metoclopramide Injectable 10 milliGRAM(s) IV Push every 6 hours  nystatin Powder 1 Application(s) Topical two times a day  ondansetron Injectable 4 milliGRAM(s) IV Push every 8 hours PRN  pantoprazole  Injectable 40 milliGRAM(s) IV Push every 24 hours  sodium chloride 0.9% lock flush 10 milliLiter(s) IV Push every 1 hour PRN  sucralfate suspension 1 Gram(s) Oral four times a day  valproate sodium  IVPB 500 milliGRAM(s) IV Intermittent two times a day  vitamin A &amp; D Ointment 1 Application(s) Topical at bedtime    albuterol    90 MICROgram(s) HFA Inhaler 2 Puff(s) Inhalation every 6 hours  chlorhexidine 2% Cloths 1 Application(s) Topical <User Schedule>  dorzolamide 2% Ophthalmic Solution 1 Drop(s) Right EYE <User Schedule>  metoclopramide Injectable 10 milliGRAM(s) IV Push every 6 hours  nystatin Powder 1 Application(s) Topical two times a day  ondansetron Injectable 4 milliGRAM(s) IV Push every 8 hours PRN  pantoprazole  Injectable 40 milliGRAM(s) IV Push every 24 hours  sodium chloride 0.9% lock flush 10 milliLiter(s) IV Push every 1 hour PRN  sucralfate suspension 1 Gram(s) Oral four times a day  valproate sodium  IVPB 500 milliGRAM(s) IV Intermittent two times a day  vitamin A &amp; D Ointment 1 Application(s) Topical at bedtime    Drug Dosing Weight  Height (cm): 172.7 (09 Jun 2023 21:00)  Weight (kg): 84.2 (09 Jun 2023 21:00)  BMI (kg/m2): 28.2 (09 Jun 2023 21:00)  BSA (m2): 1.98 (09 Jun 2023 21:00)    PHYSICAL EXAM:  GENERAL: NAD  EYES: EOMI, PERRLA  NECK: Supple, No JVD; Trachea midline: No LAD   NERVOUS SYSTEM:  Alert & Oriented X3,  Motor Strength 5/5 B/L upper and lower extremities  CHEST/LUNG:  breath sounds present bilaterally, No rales, rhonchi, wheezing  HEART: Regular rate and rhythm; No murmurs, rubs, or gallops  ABDOMEN: Soft, Nontender, Nondistended; Bowel sounds present, no pain or masses on palpation  : voiding well, Hatfield in place  EXTREMITIES:  2+ Peripheral Pulses, No clubbing, cyanosis, or edema  SKIN: warm, intact, no lesions     LINES/DRAINS/DEVICES  CENTRAL LINE: [ ] YES [ ] NO  LOCATION:     HATFIELD: [ ] YES [ ] NO     A-LINE:  [ ] YES [ ] NO  LOCATION:       ICU Vital Signs Last 24 Hrs  T(C): 36.1 (14 Jun 2023 00:00), Max: 36.5 (13 Jun 2023 20:00)  T(F): 97 (14 Jun 2023 00:00), Max: 97.7 (13 Jun 2023 20:00)  HR: 88 (14 Jun 2023 07:00) (88 - 127)  BP: 90/60 (14 Jun 2023 07:00) (84/50 - 120/68)  BP(mean): 69 (14 Jun 2023 07:00) (58 - 97)  ABP: --  ABP(mean): --  RR: 12 (14 Jun 2023 07:00) (12 - 38)  SpO2: 99% (14 Jun 2023 07:00) (93% - 100%)    O2 Parameters below as of 14 Jun 2023 06:00  Patient On (Oxygen Delivery Method): room air                  06-13 @ 07:01  -  06-14 @ 07:00  --------------------------------------------------------  IN: 503 mL / OUT: 1274 mL / NET: -771 mL              LABS:  CBC Full  -  ( 14 Jun 2023 03:12 )  WBC Count : 6.53 K/uL  RBC Count : 2.92 M/uL  Hemoglobin : 8.6 g/dL  Hematocrit : 26.8 %  Platelet Count - Automated : 71 K/uL  Mean Cell Volume : 91.8 fl  Mean Cell Hemoglobin : 29.5 pg  Mean Cell Hemoglobin Concentration : 32.1 gm/dL  Auto Neutrophil # : 3.98 K/uL  Auto Lymphocyte # : 1.31 K/uL  Auto Monocyte # : 0.72 K/uL  Auto Eosinophil # : 0.07 K/uL  Auto Basophil # : 0.00 K/uL  Auto Neutrophil % : 61.0 %  Auto Lymphocyte % : 20.0 %  Auto Monocyte % : 11.0 %  Auto Eosinophil % : 1.0 %  Auto Basophil % : 0.0 %    06-14    147<H>  |  119<H>  |  50<H>  ----------------------------<  109<H>  4.0   |  16<L>  |  5.15<H>    Ca    9.4      14 Jun 2023 05:31  Phos  3.4     06-14  Mg     2.2     06-14    TPro  4.6<L>  /  Alb  x   /  TBili  x   /  DBili  x   /  AST  x   /  ALT  x   /  AlkPhos  x   06-13    PT/INR - ( 14 Jun 2023 03:12 )   PT: 20.3 sec;   INR: 1.70 ratio         PTT - ( 14 Jun 2023 03:12 )  PTT:43.7 sec        RADIOLOGY & ADDITIONAL STUDIES REVIEWED DURING TEAM ROUNDS    [ ]GOALS OF CARE DISCUSSION WITH PATIENT/FAMILY/PROXY:    CRITICAL CARE TIME SPENT: 35 minutes   INTERVAL HPI/OVERNIGHT EVENTS:       PRESSORS: [ ] YES [ ] NO  WHICH:    ANTIBIOTICS:                  DATE STARTED:  ANTIBIOTICS:                  DATE STARTED:    Antimicrobial:    Cardiovascular:    Pulmonary:  albuterol    90 MICROgram(s) HFA Inhaler 2 Puff(s) Inhalation every 6 hours    Hematalogic:    Other:  chlorhexidine 2% Cloths 1 Application(s) Topical <User Schedule>  dorzolamide 2% Ophthalmic Solution 1 Drop(s) Right EYE <User Schedule>  metoclopramide Injectable 10 milliGRAM(s) IV Push every 6 hours  nystatin Powder 1 Application(s) Topical two times a day  ondansetron Injectable 4 milliGRAM(s) IV Push every 8 hours PRN  pantoprazole  Injectable 40 milliGRAM(s) IV Push every 24 hours  sodium chloride 0.9% lock flush 10 milliLiter(s) IV Push every 1 hour PRN  sucralfate suspension 1 Gram(s) Oral four times a day  valproate sodium  IVPB 500 milliGRAM(s) IV Intermittent two times a day  vitamin A &amp; D Ointment 1 Application(s) Topical at bedtime    albuterol    90 MICROgram(s) HFA Inhaler 2 Puff(s) Inhalation every 6 hours  chlorhexidine 2% Cloths 1 Application(s) Topical <User Schedule>  dorzolamide 2% Ophthalmic Solution 1 Drop(s) Right EYE <User Schedule>  metoclopramide Injectable 10 milliGRAM(s) IV Push every 6 hours  nystatin Powder 1 Application(s) Topical two times a day  ondansetron Injectable 4 milliGRAM(s) IV Push every 8 hours PRN  pantoprazole  Injectable 40 milliGRAM(s) IV Push every 24 hours  sodium chloride 0.9% lock flush 10 milliLiter(s) IV Push every 1 hour PRN  sucralfate suspension 1 Gram(s) Oral four times a day  valproate sodium  IVPB 500 milliGRAM(s) IV Intermittent two times a day  vitamin A &amp; D Ointment 1 Application(s) Topical at bedtime    Drug Dosing Weight  Height (cm): 172.7 (09 Jun 2023 21:00)  Weight (kg): 84.2 (09 Jun 2023 21:00)  BMI (kg/m2): 28.2 (09 Jun 2023 21:00)  BSA (m2): 1.98 (09 Jun 2023 21:00)    PHYSICAL EXAM:  GENERAL: pale, thin male, chronically ill appearing  EYES: EOMI, PERRLA  NECK: Supple, No JVD; Trachea midline  NERVOUS SYSTEM:  Alert & Oriented X3,  Motor Strength 4/5 B/L upper and lower extremities  CHEST/LUNG:  audible upper airway fine rhonchi-cleared withcoughing, breath sounds present bilaterally, diminished at bases, No rales, rhonchi, wheezing  HEART: Regular rate and rhythm; No murmurs, rubs, or gallops  ABDOMEN: Soft, Nontender, Nondistended; Bowel sounds present, no pain or masses on palpation  : voiding well, Hatfield in place  EXTREMITIES:  2+ Peripheral Pulses, No clubbing, cyanosis, or edema  SKIN: warm, intact, no lesions     LINES/DRAINS/DEVICES  CENTRAL LINE: [ ] YES [x ] NO  LOCATION:     HATFIELD: [x ] YES [ ] NO     A-LINE:  [ ] YES [x ] NO  LOCATION:       ICU Vital Signs Last 24 Hrs  T(C): 36.1 (14 Jun 2023 00:00), Max: 36.5 (13 Jun 2023 20:00)  T(F): 97 (14 Jun 2023 00:00), Max: 97.7 (13 Jun 2023 20:00)  HR: 88 (14 Jun 2023 07:00) (88 - 127)  BP: 90/60 (14 Jun 2023 07:00) (84/50 - 120/68)  BP(mean): 69 (14 Jun 2023 07:00) (58 - 97)  ABP: --  ABP(mean): --  RR: 12 (14 Jun 2023 07:00) (12 - 38)  SpO2: 99% (14 Jun 2023 07:00) (93% - 100%)    O2 Parameters below as of 14 Jun 2023 06:00  Patient On (Oxygen Delivery Method): room air                  06-13 @ 07:01  -  06-14 @ 07:00  --------------------------------------------------------  IN: 503 mL / OUT: 1274 mL / NET: -771 mL              LABS:  CBC Full  -  ( 14 Jun 2023 03:12 )  WBC Count : 6.53 K/uL  RBC Count : 2.92 M/uL  Hemoglobin : 8.6 g/dL  Hematocrit : 26.8 %  Platelet Count - Automated : 71 K/uL  Mean Cell Volume : 91.8 fl  Mean Cell Hemoglobin : 29.5 pg  Mean Cell Hemoglobin Concentration : 32.1 gm/dL  Auto Neutrophil # : 3.98 K/uL  Auto Lymphocyte # : 1.31 K/uL  Auto Monocyte # : 0.72 K/uL  Auto Eosinophil # : 0.07 K/uL  Auto Basophil # : 0.00 K/uL  Auto Neutrophil % : 61.0 %  Auto Lymphocyte % : 20.0 %  Auto Monocyte % : 11.0 %  Auto Eosinophil % : 1.0 %  Auto Basophil % : 0.0 %    06-14    147<H>  |  119<H>  |  50<H>  ----------------------------<  109<H>  4.0   |  16<L>  |  5.15<H>    Ca    9.4      14 Jun 2023 05:31  Phos  3.4     06-14  Mg     2.2     06-14    TPro  4.6<L>  /  Alb  x   /  TBili  x   /  DBili  x   /  AST  x   /  ALT  x   /  AlkPhos  x   06-13    PT/INR - ( 14 Jun 2023 03:12 )   PT: 20.3 sec;   INR: 1.70 ratio         PTT - ( 14 Jun 2023 03:12 )  PTT:43.7 sec        RADIOLOGY & ADDITIONAL STUDIES REVIEWED DURING TEAM ROUNDS    [ ]GOALS OF CARE DISCUSSION WITH PATIENT/FAMILY/PROXY:    CRITICAL CARE TIME SPENT: 35 minutes

## 2023-06-14 NOTE — SWALLOW BEDSIDE ASSESSMENT ADULT - NS ASR SWALLOW FINDINGS DISCUS
D/w pt, RN Chay[place], NP Nichole/Physician/Nursing/Patient
D/w pt, Dr. Noland, Dr. Connors, Dr. Martinez/Physician/Patient

## 2023-06-14 NOTE — PROGRESS NOTE ADULT - NS ATTEND AMEND GEN_ALL_CORE FT
This is a 63 year old man , from [place], [facility], ambulates with walker , chronic FC, h/o obesity, HTN, HLD, PVD, Seizures, CKD4 (base SCr 3.8), anemia, BPH, Lymphedema, hypothyroidism, HFpEF, possible COPD, decubitus ulcer (sacrum/gluteal), polyneuropathy, polyarthritis, bipolar disorder. Patient presented to ED for abdominal pain, nausea and vomiting brown emesis. Transferred to  ICU for Hypotension possibly from septic shock secondary to complicated UTI and esophagitis, f/w PsA bacteremia, completed 14d cefepime. Noted to have low H/H but no overt sign of bleeding s/p 2 unit PRBCs total (last 6/9). Possible component of hypovolemia. AMS due to toxic/metabolic encephalopathy, improving.      Plan:  - O2 supp as needed   - No sedation  - AMS due to encephalopathy; mentation improving  - Depakote for seizure disorder  - Hemodynamic monitoring, off pressors  - Albumin iv   - antiemetics and scheduled prokinetics  - tolerating carafate  - SHEYLA   - S/P blood product transfusion   - Monitor CBC daily, H/H now stable; if downtrending will need CT chest abd and pelvis   - On cefepime x 14d; d/c  - Dose vanco as per serum level for now  - Wound care for sacral wound  - Viera cath  - L IJ TLC is 15 cm cath , wave form and blood gas confirm venous  - Psy f/u  - GI eval appreciated; "repeat EGD 6/12: 95% prio esophagitis healed, inflammatory nodule in antrum, shallow diverticulum seen towards distal esophagus"  - restart DVT ppx, H/H stable      Rosemary Davalos MD  Pulmonary & Critical Care  Available on Teams.

## 2023-06-14 NOTE — SWALLOW BEDSIDE ASSESSMENT ADULT - SWALLOW EVAL: DIAGNOSIS
Pt p/w s&s of oropharyngeal dysphagia c/b poor labial seal, decreased A-P movement, decreased hyolaryngeal elevation; unable to observe mastication as pt refused solid trials. Overt s&s of penetration/aspiration observed on consecutive cup sip trials of thin liquids.
Pt p/w s&s of oropharyngeal dysphagia c/b poor labial seal, poor bolus formation, decreased A-P movement of bolus, oral residue, delayed swallow trigger, decreased hyolaryngeal elevation. Suspected premature spillage of bolus into hypopharynx and pharyngeal residue. Overt s&s of penetration/aspiration observed on thin liquid trials.

## 2023-06-14 NOTE — SWALLOW BEDSIDE ASSESSMENT ADULT - ORAL PREPARATORY PHASE
poor oral seal/Reduced oral grading Reduced oral grading Reduced oral grading/Decreased mastication ability

## 2023-06-14 NOTE — PROGRESS NOTE ADULT - SUBJECTIVE AND OBJECTIVE BOX
Patient is a 63y old  Male who presents with a chief complaint of sepsis (2023 17:56)    PATIENT IS SEEN AND EXAMINED IN ICU.    ALLERGIES:  No Known Allergies      Daily     Daily Weight in k.8 (2023 08:00)    VITALS:    Vital Signs Last 24 Hrs  T(C): 36.7 (2023 22:41), Max: 36.8 (2023 20:00)  T(F): 98 (:41), Max: 98.3 (2023 20:00)  HR: 87 (:41) (82 - 106)  BP: 118/67 (:41) (84/53 - 120/59)  BP(mean): 85 (2023 20:00) (63 - 88)  RR: 18 (:41) (12 - 38)  SpO2: 100% (:41) (96% - 100%)    Parameters below as of 2023 22:41  Patient On (Oxygen Delivery Method): room air        LABS:    CBC Full  -  ( 2023 03:12 )  WBC Count : 6.53 K/uL  RBC Count : 2.92 M/uL  Hemoglobin : 8.6 g/dL  Hematocrit : 26.8 %  Platelet Count - Automated : 71 K/uL  Mean Cell Volume : 91.8 fl  Mean Cell Hemoglobin : 29.5 pg  Mean Cell Hemoglobin Concentration : 32.1 gm/dL  Auto Neutrophil # : 3.98 K/uL  Auto Lymphocyte # : 1.31 K/uL  Auto Monocyte # : 0.72 K/uL  Auto Eosinophil # : 0.07 K/uL  Auto Basophil # : 0.00 K/uL  Auto Neutrophil % : 61.0 %  Auto Lymphocyte % : 20.0 %  Auto Monocyte % : 11.0 %  Auto Eosinophil % : 1.0 %  Auto Basophil % : 0.0 %    PT/INR - ( 2023 03:12 )   PT: 20.3 sec;   INR: 1.70 ratio         PTT - ( 2023 03:12 )  PTT:43.7 sec  06-14    147<H>  |  119<H>  |  50<H>  ----------------------------<  109<H>  4.0   |  16<L>  |  5.15<H>    Ca    9.4      2023 05:31  Phos  3.4       Mg     2.2         TPro  4.3<L>  /  Alb  x   /  TBili  x   /  DBili  x   /  AST  x   /  ALT  x   /  AlkPhos  x       CAPILLARY BLOOD GLUCOSE      POCT Blood Glucose.: 101 mg/dL (2023 11:14)        LIVER FUNCTIONS - ( 2023 03:12 )  Alb: x     / Pro: 4.3 g/dL / ALK PHOS: x     / ALT: x     / AST: x     / GGT: x           Creatinine Trend: 5.15<--, 3.69<--, 4.82<--, 4.63<--, 4.94<--, 5.22<--  I&O's Summary    2023 07:  -  2023 07:00  --------------------------------------------------------  IN: 503 mL / OUT: 1274 mL / NET: -771 mL    2023 07:  -  2023 23:29  --------------------------------------------------------  IN: 200 mL / OUT: 0 mL / NET: 200 mL            .Sputum Sputum  06-10 @ 10:23   Normal Respiratory Ivelisse present  --    Few polymorphonuclear leukocytes per low power field  Few Squamous epithelial cells per low power field  Rare Yeast like cells per oil power field  Few Gram Variable Rods per oil power field      .Blood Blood-Peripheral  06-10 @ 04:42   No growth to date.  --  --      .Blood Blood-Peripheral  06-10 @ 03:40   No growth to date.  --  --      .Blood Blood   @ 10:52   No Growth Final  --  --      .Blood Blood   @ 10:46   No Growth Final  --  --      .Blood Blood-Peripheral   @ 21:09   No Growth Final  --  Blood Culture PCR  Pseudomonas aeruginosa      Clean Catch Clean Catch (Midstream)  05-28 @ 21:02   >=3 organisms. Probable collection contamination.  --  --          MEDICATIONS:    MEDICATIONS  (STANDING):  albuterol    90 MICROgram(s) HFA Inhaler 2 Puff(s) Inhalation every 6 hours  buPROPion XL (24-Hour) . 150 milliGRAM(s) Oral daily  divalproex  milliGRAM(s) Oral two times a day  dorzolamide 2% Ophthalmic Solution 1 Drop(s) Right EYE <User Schedule>  finasteride 5 milliGRAM(s) Oral daily  heparin   Injectable 5000 Unit(s) SubCutaneous every 8 hours  metoclopramide Injectable 10 milliGRAM(s) IV Push every 6 hours  nystatin Powder 1 Application(s) Topical two times a day  pantoprazole  Injectable 40 milliGRAM(s) IV Push every 24 hours  sodium chloride 0.45%. 1000 milliLiter(s) (100 mL/Hr) IV Continuous <Continuous>  sucralfate suspension 1 Gram(s) Oral four times a day  tamsulosin 0.4 milliGRAM(s) Oral at bedtime  traZODone 50 milliGRAM(s) Oral at bedtime  vitamin A &amp; D Ointment 1 Application(s) Topical at bedtime      MEDICATIONS  (PRN):  ondansetron Injectable 4 milliGRAM(s) IV Push every 8 hours PRN Nausea and/or Vomiting  sodium chloride 0.9% lock flush 10 milliLiter(s) IV Push every 1 hour PRN Pre/post blood products, medications, blood draw, and to maintain line patency      REVIEW OF SYSTEMS:                           ALL ROS DONE [ X   ]    CONSTITUTIONAL:  LETHARGIC [   ], FEVER [   ], UNRESPONSIVE [   ]  CVS:  CP  [   ], SOB, [   ], PALPITATIONS [   ], DIZZYNESS [   ]  RS: COUGH [   ], SPUTUM [   ]  GI: ABDOMINAL PAIN [   ], NAUSEA [   ], VOMITINGS [   ], DIARRHEA [   ], CONSTIPATION [   ]  :  DYSURIA [   ], NOCTURIA [   ], INCREASED FREQUENCY [   ], DRIBLING [   ],  SKELETAL: PAINFUL JOINTS [   ], SWOLLEN JOINTS [   ], NECK ACHE [   ], LOW BACK ACHE [   ],  SKIN : ULCERS [   ], RASH [   ], ITCHING [   ]  CNS: HEAD ACHE [   ], DOUBLE VISION [   ], BLURRED VISION [   ], AMS / CONFUSION [   ], SEIZURES [   ], WEAKNESS [   ],TINGLING / NUMBNESS [   ]    PHYSICAL EXAMINATION:    GENERAL APPEARANCE: NO DISTRESS  HEENT:  NO PALLOR, NO  JVD,  NO   NODES, NECK SUPPLE  CVS: S1 +, S2 +,   RS: AEEB,  OCCASIONAL  RALES +,   NO RONCHI  ABD: SOFT, NT, NO, BS +  EXT: PE+  SKIN: WARM,   SKELETAL:  ROM REDUCED AT CERVICAL & LS SPINE  CNS:  AAO X 2-3      RADIOLOGY :    RADIOLOGY AND READINGS REVIEWED    ASSESSMENT :       PLAN:  HPI:  62 y/o M, A&Ox3, bedbound, chronic daley catheter with CKD (baseline creat 3.8), COPD, CHFpEF, BPH, PVD, Lymphedema, decubitus ulcers (Sacrum/gluteal) , Anemia, poly-neuropathy, poly-arthritis, hypothyroidism, seizures and Bipolar disorder was BIB EMS from [place] for abdominal pain, [place]. Mr. Rhoades states that he developed acute onset sharp abdominal pain 4 days ago a/w "brown" emesis (witnessed by EMS). Unable to tolerate food/drink x 4 days. Having normal bowel movements, last this morning . Denies any fevers. NH paperwork states concern for obstruction.  Patient endorses BLOODY EMESIS x 4 days upon further questioning. (29 May 2023 01:36)      # [] - RRT CALLED FOR HYPOTENSION, FEVER AND ANEMIA - PATIENT GIVEN PRBC TRANSFUSION, VANCOMYCIN ADDED, TRANSFERRED FROM SCU TO ICU    # RESOLVING SEPSIS S/T P.AERUGINOSA BACTEREMIA + COMPLICATED UTI    - S/P ON CEFEPIME [AND VANCOMYCIN], BCX [P. AERUGINOSA] AND UCX [ > 3 ORGANISMS], REPEAT BCX - NGTD  - ID CONSULT  - CRITICAL CARE EVALUATION IN PROGRESS    - S/P VASOPRESSORS    # RESOLVING GI BLEED  # ESOPHAGITIS  # ANEMIA OF CHRONIC DISEASE   # THROMBOCYTOPENIA  - MONITOR HGB, TRANSFUSION THRESHOLD HGB < 8  - TREND PLT  - S/P EGD - ULCERATIVE ESOPHAGITIS  - PPI BID  - CARAFATE QID  - ADVANCING DIET PER GI RECOMMENDATION   - GI CONSULT  - HEME/ONC CONSULT    - [] - EPISODE OF NAUSEA/VOMITING - MONITORING CLOSELY, PRN ANTIEMETICS    - [6/10] - GIVEN PRBC TRANSFUSION, PLANNED FOR 1 MORE UNIT OF PRBC, PLT AND FFP, DEPAKOTE STOPPED GIVEN THROMBOCYTOPENIA    - [] - PLANNED FOR POSSIBLE EGD    -  - REPEAT EGD - IMPROVING ESOPHAGITIS, SHALLOW DIVERTICULUM IN DISTAL ESOPHAGUS, SINGLE INFLAMMATORY NODULE IN ANTRUM    # ANEMIA DUE TO GI BLEED, ANEMIA OF CKD     # CHRONIC HYPOXIC RESPIRATORY FAILURE S/T UNDERLYING COPD  - ON PRN O2  - CRITICAL CARE EVALUATION IN PROGRESS    # THROMBOCYTOPENIA  - W/UP IN PROGRESS  - HEME/ONC CONSULT    # AMS DUE TO ACUTE METABOLIC ENCEPHALOPATHY    # SHEYLA ON CKD STAGE 4  - WORSENING  # CHRONIC DALEY, BPH  - DALEY   - STRICT IS AND OS  - AVOID NEPHROTOXIC AGENTS  - MONITOR CR  - NEPHROLOGY CONSULT    - [] - URINARY RETENTION OVERNIGHT - DALEY PLACED    # AMBULATORY DYSFUNCTION, IMPAIRED GAIT DUE TO GENERALIZED MUSCLE WEAKNESS, CERVICAL & LS SPINAL STENOSIS, POLYARTRHITIS & PERIPHERAL NEUROPATHY. OP  - FALL PRECAUTIONS    # DYSPHAGIA DUE TO METABOLIC ENCEPHALOPATHY - ON MODIFIED DIET PER SWALLOW EVAL     # HX OF SEIZURE DX  - ON VALPROIC ACID    # HYPOTHYROIDISM - ON LEVOTHYROXINE    # PRESSURE ULCERS  - PATIENT IS HIGH RISK FOR PRESSURE ULCERS DESPITE PREVENTIVE MEASURES IN PLACE  - WOUND CARE CONSULT    # BIPOLAR DISORDER - ON DEPAKOTE    # GI PPX

## 2023-06-14 NOTE — PROGRESS NOTE ADULT - SUBJECTIVE AND OBJECTIVE BOX
Time of Visit:  Patient seen and examined. lying in bed comfortable     MEDICATIONS  (STANDING):  albuterol    90 MICROgram(s) HFA Inhaler 2 Puff(s) Inhalation every 6 hours  buPROPion XL (24-Hour) . 150 milliGRAM(s) Oral daily  chlorhexidine 2% Cloths 1 Application(s) Topical <User Schedule>  divalproex  milliGRAM(s) Oral two times a day  dorzolamide 2% Ophthalmic Solution 1 Drop(s) Right EYE <User Schedule>  finasteride 5 milliGRAM(s) Oral daily  heparin   Injectable 5000 Unit(s) SubCutaneous every 8 hours  metoclopramide Injectable 10 milliGRAM(s) IV Push every 6 hours  nystatin Powder 1 Application(s) Topical two times a day  pantoprazole  Injectable 40 milliGRAM(s) IV Push every 24 hours  sodium chloride 0.45%. 1000 milliLiter(s) (100 mL/Hr) IV Continuous <Continuous>  sucralfate suspension 1 Gram(s) Oral four times a day  tamsulosin 0.4 milliGRAM(s) Oral at bedtime  traZODone 50 milliGRAM(s) Oral at bedtime  vitamin A &amp; D Ointment 1 Application(s) Topical at bedtime      MEDICATIONS  (PRN):  ondansetron Injectable 4 milliGRAM(s) IV Push every 8 hours PRN Nausea and/or Vomiting  sodium chloride 0.9% lock flush 10 milliLiter(s) IV Push every 1 hour PRN Pre/post blood products, medications, blood draw, and to maintain line patency       Medications up to date at time of exam.      PHYSICAL EXAMINATION:  Patient has no new complaints.  GENERAL: The patient is a well-developed, well-nourished, in no apparent distress.     Vital Signs Last 24 Hrs  T(C): 36.1 (14 Jun 2023 16:04), Max: 36.5 (13 Jun 2023 20:00)  T(F): 97 (14 Jun 2023 16:04), Max: 97.7 (13 Jun 2023 20:00)  HR: 103 (14 Jun 2023 16:00) (87 - 126)  BP: 112/71 (14 Jun 2023 16:00) (84/53 - 120/59)  BP(mean): 83 (14 Jun 2023 16:00) (63 - 97)  RR: 36 (14 Jun 2023 16:00) (12 - 38)  SpO2: 98% (14 Jun 2023 16:00) (96% - 100%)    Parameters below as of 14 Jun 2023 06:00  Patient On (Oxygen Delivery Method): room air       (if applicable)    Chest Tube (if applicable)    HEENT: Head is normocephalic and atraumatic. Extraocular muscles are intact. Mucous membranes are moist.     NECK: Supple, no palpable adenopathy.    LUNGS: Clear to auscultation, no wheezing, rales, or rhonchi.    HEART: Regular rate and rhythm without murmur.    ABDOMEN: Soft, nontender, and nondistended.  No hepatosplenomegaly is noted.    : No painful voiding, no pelvic pain    EXTREMITIES: Without any cyanosis, clubbing, rash, lesions or edema.    NEUROLOGIC: Awake, alert, oriented, grossly intact    SKIN: Warm, dry, good turgor.      LABS:                        8.6    6.53  )-----------( 71       ( 14 Jun 2023 03:12 )             26.8     06-14    147<H>  |  119<H>  |  50<H>  ----------------------------<  109<H>  4.0   |  16<L>  |  5.15<H>    Ca    9.4      14 Jun 2023 05:31  Phos  3.4     06-14  Mg     2.2     06-14    TPro  4.3<L>  /  Alb  x   /  TBili  x   /  DBili  x   /  AST  x   /  ALT  x   /  AlkPhos  x   06-14    PT/INR - ( 14 Jun 2023 03:12 )   PT: 20.3 sec;   INR: 1.70 ratio         PTT - ( 14 Jun 2023 03:12 )  PTT:43.7 sec                    MICROBIOLOGY: (if applicable)    RADIOLOGY & ADDITIONAL STUDIES:  EKG:   CXR:  ECHO:    IMPRESSION: 63y Male PAST MEDICAL & SURGICAL HISTORY:  Hypothyroid      Hyperlipidemia      Ambulatory dysfunction      Anemia      History of BPH      CKD (chronic kidney disease)      Chronic obstructive pulmonary disease (COPD)      Bipolar disorder      HLD (hyperlipidemia)      BPH (benign prostatic hyperplasia)      Chronic diastolic congestive heart failure      Lymphedema      Chronic leg pain      No significant past surgical history       p/w           IMP: This is a 63 year old man , from [place], AOx3, ambulates with walker , chronic FC, h/o obesity, HTN, HLD, PVD, Seizures, CKD4 (base SCr 3.8), anemia, BPH, Lymphedema, hypothyroidism, HFpEF, possible COPD, decubitus ulcer (sacrum/gluteal), polyneuropathy, polyarthritis, bipolar disorder. Patient presented to ED for abdominal pain, nausea and vomiting brown emesis. Patient admitted to ICU for Hypotension likely from Sepsis secondary to complicated UTI and esophagitis. CT head was negative for acute changes. CT abd showed circumferential wall thickening of distal esophagus. GI consulted recommending endoscopy which took place on 5/30 with findings of ulcerative esophagitis. On PPI.   Blood cultures from 5/28 + for P. aeruginasa. Ucx likely contaminant. On cefepime.  Patient with pressure injury. Wound care consult. Nephrology following for SHEYLA on CKD.   06/01: Repeat blood cx pending. Advance diet as tolerated. PT reccs TG (5/30). Plan to return to [facility] once repeat Bcx come back. C/w Cefepime for complicated UTI and P. Aeruginosa bacteriemia. Afebrile. BP soft this AM.    06/02: Repeat blood cx pending. On Cefepime. PT reccs TG. Dispo ([facility]). Hemodynamically stable. Plan for OOB to chair.   06/03: Repeat Bcx from 6/1 NGTD. Afebrile. On Cefepime. ID following. Hemodynamically stable. Plan to advance diet to full liquids.   6/4: Patient with tachycardia secondary to frustration from not being able to be discharge. Emotional support given, encouraged to verbalize concerns, explained in full the plan of care. Patient VS became stable, will hold off on EKG as isolated event.   06/07: Urinary retention with Viera insertion overnight. No acceptance from chosen facilities. Bcx NGTD. Plan to d/c on Cipro and Maxepime for a total of 14 days per ID. SCr increased to 4.80 from 4.48. Hypokalemic this AM. Replacement in progress. Titrating O2.   06/08: [place] in WBC. Afebrile. Soft BP yesterday with SBP in the 90s.  C/o SOB this AM. See assessment above. CXR ordered and BNP. Patient refuses to participate in PT. Will attempt again today. Plan to D/C to [facility] when optimized.   06/09: Lethargic. Thrombocytopenia Discontinued Depakote. Discussed with psych as it could be causing drop on plt. HIT panel negative. Hem/Onc consulted. Soft BP. Orthostatic hypotension. Midodrine started. IVF. Electrolyte derangements Replacement in progress. CXR shows possible infiltrate RLL. on 2L NC. Afebrile. Worsening SHEYLA. Discussed wit hnephro. No plan for HD at this time. Will continue to monitor. Patient has Viera and making urine.   Transferred to  ICU for Hypotension likely from Sepsis secondary to complicated UTI and esophagitis. Noted to have low H/H but no overt sign of bleeding s/p 1 unit PRBC. AMS due to encephalopathy   SHEYLA is progressively worst with metabolic acidosis     [place]:  - Neph for possible HD   - O2 supp as needed   - No sedation   - AMS due to encephalopathy improving    - Heme eval noted .. not DIC   - Pat has cough due to oral secretions   - Prokinetic for GI   - Asp precaution      - DVT GI prophy

## 2023-06-14 NOTE — CHART NOTE - NSCHARTNOTEFT_GEN_A_CORE
[ICU course]    63M, from [place], AOx3, bedbound, chronic FC, h/o obesity, HTN, HLD, PVD, Seizures, CKD (base SCr 3.8), anemia, BPH, Lymphedema, hypothyroidism, HFpEF, COPD, decubitus ulcer (sacrum/gluteal), polyneuropathy, polyarthritis, bipolar disorder, p/w abd pain a/w "brown” emesis and food intolerance x4d. Admitted for complicated UTI + suspected GIB.    Patient initially had BP of 88/62 but it improved with IVF. Patient admitted to poor oral intake for the past 4 days due to nausea and vomiting.  CT head was negative for acute changes. CT abd showed circumferential wall thickening of distal esophagus. PPI was started.  Patient complains of persistent nausea without vomiting. Reglan was started TID to improve GI motility. Clear liquid diet was started. Hemoglobin was stable around his baseline ~9. Pt had thrombocytopenia with concern for cefepime induced thrombobytopenia.  We are following daily d-dimers and dribrinogen levels with plan to transfuse 10units cryo if PLTs < 100 or if PLTs < 150 with bleeding. PLTs have been stable in 70-50's and Heparin subQ was started for DVT prophylaxis.    Patient had chronic daley from Dec 2022 and he failed TOV. UA was positive. Patient was started on Cefepime. Blood cultures and urine cultures were sent.  Wound care was consulted for sacral pressure ulcer.    Patient presented with BUN/Cr 103/4.98 (Baseline Cr 3.6~3.8). Patient received IVF and SCr has been slowly improving. Dr. Valenzuela nephrologist is on board.    Patient presented with Hypokelamia with K 3.1->2.8, likely due to vomiting and decreased PO intake. It was repleted IV and oral.    Patient is stable for transfer to . NP _______ was informed.    [Things to follow]    - daily fibrinogen and d-dimer: if < 100 or < 150 and bleeding, transfuse 10units cryo  - Trend Hgb q8~q12hrs  - f/u Swallow eval   - Advance diet as tolerated [ICU course]    63M, from [place], AOx3, bedbound, chronic FC, h/o obesity, HTN, HLD, PVD, Seizures, CKD (base SCr 3.8), anemia, BPH, Lymphedema, hypothyroidism, HFpEF, COPD, decubitus ulcer (sacrum/gluteal), polyneuropathy, polyarthritis, bipolar disorder, p/w abd pain a/w "brown” emesis and food intolerance x4d. Admitted for complicated UTI + suspected GIB.    Patient initially had BP of 88/62 but it improved with IVF. Patient admitted to poor oral intake for the past 4 days due to nausea and vomiting.  CT head was negative for acute changes. CT abd showed circumferential wall thickening of distal esophagus. PPI was started.  Patient complains of persistent nausea without vomiting. Reglan was started TID to improve GI motility. Clear liquid diet was started. Hemoglobin was stable around his baseline ~9. Pt had thrombocytopenia with concern for cefepime induced thrombobytopenia.  We are following daily d-dimers and dribrinogen levels with plan to transfuse 10units cryo if PLTs < 100 or if PLTs < 150 with bleeding. PLTs have been stable in 70-50's and Heparin subQ was started for DVT prophylaxis.    Patient had chronic daley from Dec 2022 and he failed TOV. UA was positive. Patient was started on Cefepime. Blood cultures and urine cultures were sent.  Wound care was consulted for sacral pressure ulcer.    Patient presented with BUN/Cr 103/4.98 (Baseline Cr 3.6~3.8). Patient received IVF and SCr has been slowly improving. Dr. Valenzuela nephrologist is on board.    Patient presented with Hypokelamia with K 3.1->2.8, likely due to vomiting and decreased PO intake. It was repleted IV and oral.    Patient is stable for transfer to  ([id number]). NP Ally Spangler was informed.    [Things to follow]    - daily fibrinogen and d-dimer: if < 100 or < 150 and bleeding, transfuse 10units cryo  - Trend Hgb q8~q12hrs  - Advance diet as tolerated

## 2023-06-14 NOTE — SWALLOW BEDSIDE ASSESSMENT ADULT - ORAL PHASE
Decreased anterior-posterior movement of the bolus oral residue, over 50% not cleared by liquid wash/Decreased anterior-posterior movement of the bolus Bolus holding/Decreased anterior-posterior movement of the bolus/Delayed oral transit time

## 2023-06-14 NOTE — SWALLOW BEDSIDE ASSESSMENT ADULT - SWALLOW EVAL: RECOMMENDED FEEDING/EATING TECHNIQUES
allow for swallow between intakes/check mouth frequently for oral residue/pocketing/crush medication (when feasible)/hard swallow w/ each bite or sip/maintain upright posture during/after eating for 30 mins/no straws/oral hygiene/position upright (90 degrees)/small sips/bites
allow for swallow between intakes/check mouth frequently for oral residue/pocketing/crush medication (when feasible)/hard swallow w/ each bite or sip/maintain upright posture during/after eating for 30 mins/no straws/oral hygiene/position upright (90 degrees)/small sips/bites

## 2023-06-14 NOTE — SWALLOW BEDSIDE ASSESSMENT ADULT - ADDITIONAL RECOMMENDATIONS
Continue GI consult to r/o esophageal/GI concerns
1) Consider objective study (MBS) if pt continues to show s&s penetration/aspiration on liquids   2) Consider neuro consult due to worsening mentation since last visit

## 2023-06-14 NOTE — PROGRESS NOTE ADULT - ASSESSMENT
· Assessment	   63 year old Male, from [place], AOx3, ambulates with walker , chronic FC, h/o obesity, HTN, HLD, PVD, Seizures, CKD4 (base SCr 3.8), anemia, BPH, Lymphedema, hypothyroidism, HFpEF, possible COPD, decubitus ulcer (sacrum/gluteal), polyneuropathy, polyarthritis, bipolar disorder, presented to ED for abdominal pain, nausea and vomiting brown emesis. Admitted to ICU for Hypotension likely from Sepsis secondary to complicated UTI and esophagitis. Readmitted to ICU status post RRT for hypotension and concern for evolving septic vs hypovolemic shock.     Plan  ====Neuro====  #Baseline mentation: AAOx2 and able to be reoriented  - No active issues.     #Seizure  - Continue Valproic Acid IVPB 500mg BID  - no seizure activities, seizure precautions in place     #Bipolar disorder.   - Wellbutrin, Trazadone being held due to continued po intolerance  - Consult Psych for IM regimen while not tolerating po  - Continue Depakote    #Failure to thrive  - Palliative following    ====CVS====  #Sepsis 2/2 pseudomonas bacteremia and UTI   #Hemorrhagic shock 2/2 Ulcerative esophagitis  - was already on cefepime (last day 6/11 to complete a total of 14 days), d/c on 612  - off levophed as of 6/13  - Given 1x bolus of 1L LR   - sputum culture 6/10 neg, Blood Cx 6/10 Neg and cleared from 6/1, UA 6/6 +  - ID following, f/u ID recs    ====Pulm====  #No acute issues  - cont with NC as needed  - monitor O2 sat and titrate off as indicated  - repeat CXR today -> repeat CXR unchanged from prior  - 6/12 ON had episode of tachypnea and wheezing, given 1x dose of lasix. No concern at this time for acute pulmonary edema.   - +Staph on Nares PCR    ====GI====  #Diet: Clear Liquid diet.  #Bowel regimen: none    #Ulcerative esophagitis  - cont. w/ PPI and Carafate  - s/p EGD 5/30/23 -> contact GI in AM 6/12 for possible repeat EGD given patient required 2units PRBCs on 6/10  - Repeat EGD on 6/13 with no active bleeding, duodenal ulcer, and improving esophagitis  - Hgb 6/12 stable @ 8.9, no signs of active bleeding or hematemesis   - GI following, appreciate recs  - Send FOBT with neg BM    #Persistent Nausea 2/2 ulcerative esophagitis vs. gastroparesis   - Reglan 10mg q6h  - Zofran 4mg prn  - Consider placement of NGT for nutrition if nausea and vomiting persist  - Clear liquid diet started as patient tolerates, avoid carbonated beverages or orange juice    ====Renal====  #SHEYLA on CKD  #Hypokalemia likely 2/2 Distal RTA  - Cr plateaued 5.2 now 4.94   - monitor BMP and UOP  - no emergent need for HD at this time  - replete electrolytes prn, replete K if less than 3  - Maintain K>4, Mag >2, Ph >3  - nephro following  - bicarb gtt d/cd 6/11, trend venous pH    #BPH  - cont with finasteride and tamsulosin  - cont daley    ====ID====  #Sepsis 2/2 pseudomonas bacteremia and UTI   - was already on cefepime (last day 6/11 to complete a total of 14 days), d/c on 612  - sputum culture 6/10 neg, Blood Cx 6/10 Neg and cleared from 6/1, UA 6/6 +  - Cefepime d/c iso thrombocytopenia. No need fo abx therapy due to clinical improvement.   - ID following, f/u ID recs    ====Heme/Onc====  #thrombocytopenia likely 2/2 cefepime  -HIT panel negative, XENIA negative  -fibrinogen low, PTT elevated, but d-dimer low; inconsistent with DIC  - PLT 88 from 103, no active bleeding  - follow CBC from 6/12  - keep PLT > 50  - f/u hep panel, ferritin, B12, folate, SPEP, ROSANNE, FABRICE, RF  - f/u daily d-dimer and fibrinogen  - heme/onc following, appreciate recs    #anemia  -h/o suspected GIB, s/p EGD; negative for GIB (5/30), repeat EGD 6/12 with improving esophagitis and no signs of active bleeding  -s/p 2 units PRBCs with appropriate response  6.8> 9.7  - o evidence of hemolysis, hapto and bili WNL  -transfuse < 7  - Start subq heparin tmrw if hgb stable and plt continue to uptrend    ====Endo====  #bG goal: 140-180  - ISS for poor oral intake    #Hypothyroidism   - continue synthroid 137mcg daily    ====Skin/lines====  #Sacral and right gluteal pressure ulcer   - Wound care as ordered   #peripheral: 2 peripherals in each arm  #CVC: [facility] (6/9)  #daley: yes  ====Ppx====  #DVT: hold given acute anemia    #GI: PPI  #Palliative care following, maintains Full Code   · Assessment	   63 year old Male, from [place], AOx3, ambulates with walker , chronic FC, h/o obesity, HTN, HLD, PVD, Seizures, CKD4 (base SCr 3.8), anemia, BPH, Lymphedema, hypothyroidism, HFpEF, possible COPD, decubitus ulcer (sacrum/gluteal), polyneuropathy, polyarthritis, bipolar disorder, presented to ED for abdominal pain, nausea and vomiting brown emesis. Admitted to ICU for Hypotension likely from Sepsis secondary to complicated UTI and esophagitis. Readmitted to ICU status post RRT for hypotension and concern for evolving septic vs hypovolemic shock.     Plan  ====Neuro====  #Baseline mentation: AAOx2 and able to be reoriented  - No active issues  -tolerating clear liquid diet    #Seizure  - switch Valproic Acid to PO, 500mg BID  - no seizure activities, seizure precautions in place     #Bipolar disorder.   -tolerating clear liquid diet  -restart buproprion XL 150mg PO daily  -restart trazodone 50mg qHS  - Continue Depakote as above  -consulted with psych who recommends continuing depakote foremost  - if patient is unable to tolerate PO, may defer buproprion and trazodone    #Failure to thrive  - Palliative following    ====CVS====  #Sepsis 2/2 pseudomonas bacteremia and UTI    #Hemorrhagic shock 2/2 Ulcerative esophagitis  (resolved)  - was already on cefepime (last day 6/11 to complete a total of 14 days), d/c on 612  - off levophed as of 6/13  - sputum culture 6/10 neg, Blood Cx 6/10 Neg and cleared from 6/1, UA 6/6 +  - ID following, f/u ID recs  - low BP this am, in setting of dehydration as evidenced by FWD of 2L,m dry cracked mucous membranes, and collapsible IVC.  - will repeat 1L LR bolus and add maintenance 1/2NS@100ml/hr  x 10 hrs      ====Pulm====  #No acute issues  - on room air  - +Staph on Nares PCR  -- no acute changes on CXR 6/13  - POCUS exam A-line predominant with bibasilar consolidations and static airbronchograms.   - will initiate chest PT to facilitate airway clearance  - continue albuterol q6 hrs    ====GI====  #Diet: Clear Liquid diet.  #Bowel regimen: none  -tolerating clear liquids, reports subjective nausea without vomiting  -noted to be pooling saliva in oropharynx  -f/u formal swallow eval    #Ulcerative esophagitis  - cont. w/ PPI and Carafate  - s/p EGD 5/30/23 -> contact GI in AM 6/12 for possible repeat EGD given patient required 2units PRBCs on 6/10  - Repeat EGD on 6/13 with no active bleeding, duodenal ulcer, and improving esophagitis  - Hgb 6/12 stable @ 8.9, no signs of active bleeding or hematemesis   - GI following, appreciate recs  - Send FOBT with neg BM    #Persistent Nausea 2/2 ulcerative esophagitis vs. gastroparesis   - Reglan 10mg q6h  - Zofran 4mg prn  - Consider placement of NGT for nutrition if nausea and vomiting persist  - Clear liquid diet started as patient tolerates, avoid carbonated beverages or orange juice    ====Renal====  #SHEYLA on CKD  #Hypokalemia likely 2/2 Distal RTA  - Cr plateaued 5.2 now 4.94   - monitor BMP and UOP  - no emergent need for HD at this time  - replete electrolytes prn, replete K if less than 3  - Maintain K>4, Mag >2, Ph >3  - nephro following  - bicarb gtt d/cd 6/11, trend venous pH    #BPH  - cont with finasteride and tamsulosin  - cont daley    ====ID====  #Sepsis 2/2 pseudomonas bacteremia and UTI   - was already on cefepime (last day 6/11 to complete a total of 14 days), d/c on 612  - sputum culture 6/10 neg, Blood Cx 6/10 Neg and cleared from 6/1, UA 6/6 +  - Cefepime d/c iso thrombocytopenia. No need fo abx therapy due to clinical improvement.   - ID following, f/u ID recs    ====Heme/Onc====  #thrombocytopenia likely 2/2 cefepime  -HIT panel negative, XENIA negative  -fibrinogen low, PTT elevated, but d-dimer low; inconsistent with DIC  - PLT 88 from 103, no active bleeding  - follow CBC from 6/12  - keep PLT > 50  - f/u hep panel, ferritin, B12, folate, SPEP, ROSANNE, FABRICE, RF  - f/u daily d-dimer and fibrinogen  - heme/onc following, appreciate recs    #anemia  -h/o suspected GIB, s/p EGD; negative for GIB (5/30), repeat EGD 6/12 with improving esophagitis and no signs of active bleeding  -s/p 2 units PRBCs with appropriate response  6.8> 9.7  - o evidence of hemolysis, hapto and bili WNL  -transfuse < 7  - Start subq heparin tmrw if hgb stable and plt continue to uptrend    ====Endo====  #bG goal: 140-180  - ISS for poor oral intake    #Hypothyroidism   - continue synthroid 137mcg daily    ====Skin/lines====  #Sacral and right gluteal pressure ulcer   - Wound care as ordered   #peripheral: 2 peripherals in each arm  #CVC: [facility] (6/9)  #daley: yes  ====Ppx====  #DVT: hold given acute anemia    #GI: PPI  #Palliative care following, maintains Full Code   · Assessment	   63 year old Male, from [place], AOx3, ambulates with walker , chronic FC, h/o obesity, HTN, HLD, PVD, Seizures, CKD4 (base SCr 3.8), anemia, BPH, Lymphedema, hypothyroidism, HFpEF, possible COPD, decubitus ulcer (sacrum/gluteal), polyneuropathy, polyarthritis, bipolar disorder, presented to ED for abdominal pain, nausea and vomiting brown emesis. Admitted to ICU for Hypotension likely from Sepsis secondary to complicated UTI and esophagitis. Readmitted to ICU status post RRT for hypotension and concern for evolving septic vs hypovolemic shock.     Plan  ====Neuro====  #Baseline mentation: AAOx2 and able to be reoriented  - No active issues  -tolerating clear liquid diet    #Seizure  - switch Valproic Acid to PO, 500mg BID  - no seizure activities, seizure precautions in place     #Bipolar disorder.   -tolerating clear liquid diet  -restart buproprion XL 150mg PO daily  -restart trazodone 50mg qHS  - Continue Depakote as above  -consulted with psych who recommends continuing depakote foremost  - if patient is unable to tolerate PO, may defer buproprion and trazodone    #Failure to thrive  - Palliative following    ====CVS====  #Sepsis 2/2 pseudomonas bacteremia and UTI    #Hemorrhagic shock 2/2 Ulcerative esophagitis  (resolved)  - was already on cefepime (last day 6/11 to complete a total of 14 days), d/c on 612  - off levophed as of 6/13  - sputum culture 6/10 neg, Blood Cx 6/10 Neg and cleared from 6/1, UA 6/6 +  - ID following, f/u ID recs  - intermittent hypotension this am, in setting of dehydration as evidenced by FWD of 2L,m dry cracked mucous membranes, and collapsible IVC.  - will repeat 1L LR bolus and add maintenance 1/2NS@100ml/hr  x 10 hrs  -encourage PO intake      ====Pulm====  #No acute issues  - on room air  - +Staph on Nares PCR  -- no acute changes on CXR 6/13  - POCUS exam A-line predominant with bibasilar consolidations and static airbronchograms.   - will initiate chest PT to facilitate airway clearance  - continue albuterol q6 hrs    ====GI====  #Diet: Clear Liquid diet.  #Bowel regimen: none  -tolerating clear liquids, reports subjective nausea without vomiting  -noted to be pooling saliva in oropharynx  -f/u formal swallow eval    #Ulcerative esophagitis  - cont. w/ PPI and Carafate  - s/p EGD 5/30/23 -> contact GI in AM 6/12 for possible repeat EGD given patient required 2units PRBCs on 6/10  - Repeat EGD on 6/13 with no active bleeding, duodenal ulcer, and improving esophagitis  - Hgb 6/12 stable @ 8.9, no signs of active bleeding or hematemesis   - GI following, appreciate recs    #Persistent Nausea 2/2 ulcerative esophagitis vs. gastroparesis   - continue Reglan 10mg q6h  - continue Zofran 4mg prn  - tolerating clear liquid diet, small amounts, encourage PO intake    ====Renal====  #SHEYLA on CKD  #Hypokalemia likely 2/2 Distal RTA  - Cr plateaued 5.2 > 4.94 > now rising to 5.15 in setting of dehydration   - LR bolus as above  -1/2NS @100mL/hr x 10 hrs  - monitor BMP and UOP  - replete electrolytes prn, replete K if less than 3  - Maintain K>4, Mag >2, Ph >3  - Nephro, Dr Olivier,  following, if Cr continues to increase, he will discuss HD with Germán, Mr Alcaraz's brother and proxy    #BPH  - cont with finasteride and tamsulosin  - cont daley  -plan for ToV tomorrow    ====ID====  #Sepsis 2/2 pseudomonas bacteremia and UTI   - was already on cefepime (last day 6/11 to complete a total of 14 days), d/c on 612  - sputum culture 6/10 neg, Blood Cx 6/10 Neg and cleared from 6/1, UA 6/6 +  - Cefepime d/c iso thrombocytopenia. No need fo abx therapy due to clinical improvement.   - ID following, f/u ID recs    ====Heme/Onc====  #thrombocytopenia likely 2/2 cefepime  -HIT panel negative, XENIA negative  - fibrinogen low, PTT elevated, but d-dimer low; inconsistent with DIC  - PLT's 70-80s, stable at this time  - daily d-dimer and fibrinogen; dimer remains low, fibrinogen 157  - follow CBC from 6/12, completed cefepime 6/11  - keep PLT > 50  - f/u hep panel, ferritin, B12, folate, SPEP, ROSANNE, FABRICE, RF  - heme/onc following, appreciate recs    #anemia  -h/o suspected GIB, s/p EGD; negative for GIB (5/30), repeat EGD 6/12 with improving esophagitis and no signs of active bleeding  -s/p 2 units PRBCs with appropriate response  6.8> 9.7  - o evidence of hemolysis, hapto and bili WNL  -transfuse < 7    ====Endo====  #bG goal: 140-180  - ISS for poor oral intake    #Hypothyroidism   - continue synthroid 137mcg daily    ====Skin/lines====  #Sacral and right gluteal pressure ulcer   - Wound care as ordered   #peripheral: Right upper arm Accucath-extended dwell catheter  #CVC: d/c'd 6/13  #daley: yes    ====Ppx====  #DVT: will restart heparin 5000 units subQ q8 hours    #GI: PPI  #Palliative care following, maintains Full Code

## 2023-06-14 NOTE — SWALLOW BEDSIDE ASSESSMENT ADULT - COMMENTS
Less than 50% of bolus swallowed on initial swallow, unable to cue swallow; approx. 70% oral residue not cleared by liquid wash, suctioned by SLP. HOB elevated to 90°. AA+Ox2-3; pt awake, able to make needs known. Pt able to answer SLP queries and follow one-step directions. Difficulty following two-step directions. Pt denies pain, difficulty swallowing, globus sensation; endorses continued nausea. Pt positioning leaning right side. Mentation has worsened since previous s/s eval. Unable to cue swallow; pt would cough or jerk neck forward with verbal/tactile cues to swallow. Trials of MincedMoist solids were suctioned by SLP.

## 2023-06-14 NOTE — PROGRESS NOTE ADULT - SUBJECTIVE AND OBJECTIVE BOX
GAYLA PEARCE  63y  Patient is a 63y old  Male who presents with a chief complaint of sepsis (2023 07:40)    HPI:  Followed for SHEYLA on CKD. He is not Oliguric at this time.    HEALTH ISSUES - PROBLEM Dx:  GIB (gastrointestinal bleeding)    Sepsis    Anemia    Hypothyroid    CKD (chronic kidney disease)    BPH (benign prostatic hyperplasia)    Bipolar disorder    Advance care planning    Seizure    Esophagitis    Hypokalemia    Ambulatory dysfunction    Prophylactic measure    Electrolyte depletion    Acute encephalopathy    Acute kidney injury superimposed on CKD    Ulcerative esophagitis    Decubitus ulcers    Functional quadriplegia    Moderate protein-calorie malnutrition    Encounter for palliative care    Chronic heart failure with preserved ejection fraction (HFpEF)    Sepsis with acute hypoxic respiratory failure    Protein calorie malnutrition    Palliative care encounter    SHEYLA (acute kidney injury)    Palliative care encounter    Debility    Severe protein-calorie malnutrition    Encephalopathy, unspecified          MEDICATIONS  (STANDING):  albuterol    90 MICROgram(s) HFA Inhaler 2 Puff(s) Inhalation every 6 hours  buPROPion XL (24-Hour) . 150 milliGRAM(s) Oral daily  chlorhexidine 2% Cloths 1 Application(s) Topical <User Schedule>  divalproex  milliGRAM(s) Oral two times a day  dorzolamide 2% Ophthalmic Solution 1 Drop(s) Right EYE <User Schedule>  finasteride 5 milliGRAM(s) Oral daily  heparin   Injectable 5000 Unit(s) SubCutaneous every 8 hours  metoclopramide Injectable 10 milliGRAM(s) IV Push every 6 hours  nystatin Powder 1 Application(s) Topical two times a day  pantoprazole  Injectable 40 milliGRAM(s) IV Push every 24 hours  sucralfate suspension 1 Gram(s) Oral four times a day  tamsulosin 0.4 milliGRAM(s) Oral at bedtime  traZODone 50 milliGRAM(s) Oral at bedtime  vitamin A &amp; D Ointment 1 Application(s) Topical at bedtime    MEDICATIONS  (PRN):  ondansetron Injectable 4 milliGRAM(s) IV Push every 8 hours PRN Nausea and/or Vomiting  sodium chloride 0.9% lock flush 10 milliLiter(s) IV Push every 1 hour PRN Pre/post blood products, medications, blood draw, and to maintain line patency    Vital Signs Last 24 Hrs  T(C): 35.6 (2023 08:00), Max: 36.5 (2023 20:00)  T(F): 96 (2023 08:00), Max: 97.7 (2023 20:00)  HR: 101 (2023 09:00) (88 - 126)  BP: 102/63 (2023 09:00) (84/53 - 113/89)  BP(mean): 74 (2023 09:00) (63 - 97)  RR: 27 (:00) (12 - 38)  SpO2: 99% (2023 09:00) (94% - 100%)    Parameters below as of 2023 06:00  Patient On (Oxygen Delivery Method): room air      Daily     Daily Weight in k.8 (2023 08:00)    PHYSICAL EXAM:  Constitutional:  He appears comfortable and not distressed. Not diaphoretic.    Neck:  The thyroid is normal. Trachea is midline.     Breasts: Normal examination.    Respiratory: The lungs are clear to auscultation. No dullness and expansion is normal.    Cardiovascular: S1 and S2 are normal. No murmurs rubs or gallops are present.    Gastrointestinal: The abdomen is soft. No tenderness is present. No masses are present. Bowel sounds are normal.    Genitourinary: The bladder is not distended. No CVA tenderness is present.    Extremities: No edema is noted. No deformities are present.    Neurological: Cognition is normal. Tone, power and sensation are normal.     Skin: No lesions are seen  or palpated.                              8.6    6.53  )-----------( 71       ( 2023 03:12 )             26.8     06-14    147<H>  |  119<H>  |  50<H>  ----------------------------<  109<H>  4.0   |  16<L>  |  5.15<H>    Ca    9.4      2023 05:31  Phos  3.4     06-14  Mg     2.2     06-14    TPro  4.3<L>  /  Alb  x   /  TBili  x   /  DBili  x   /  AST  x   /  ALT  x   /  AlkPhos  x   -14

## 2023-06-14 NOTE — PROGRESS NOTE ADULT - ASSESSMENT
SHEYLA: Likely ATN on CKD after shock and bleeding. His Cr has worsened again.  On Vasopressors and Urine Output is > 50 mL/hour. He may be pre-renal now.  - Start hydration, monitoring for Pulmonary edema.  - Follow up BMP.  - MAP > 65.  - Maintain daley for now.  - HD if no improvement.      Metabolic Acidosis:  Non AG, likely Distal RTA.  Associated with relatively low K.  - Monitor BMP.    Hypernatremia:  Possible due to Volume depletion.  - Start Hypotonic fluids, preferable 1/2 NS at 50 mL/hour    CKD  4  -Renal diet.  - Avoid Nephrotoxins.  - follow up BMP.    Anemia of CKD:  - Transfuse as needed for Hg < 7.    Hypokalemia:  Likely Distal RTA. Mg is wnl. Improved today.  - Monitor K and Mg.  - Supplement as needed.

## 2023-06-14 NOTE — PROGRESS NOTE ADULT - NS ATTEND AMEND GEN_ALL_CORE FT
# THROMBOCYTOPENIA  most likely multifactorial  sepsis/polypharmacy = including abx and Depakote   DIC and  HIT w/u was negative  SDP tx if platelet < 20 K( sepsis)  or bleeding  keep plat > 50 K for  invasive procedure   f/u fibrinogen , recommend  Cryo tx if < 100-150  F/u CBC      #  NCNC ANEMIA  # SHEYLA on CKD  w/u c/w with anemia of chronic disease  f/u CBC, PRBC TX prn for HB<7   if persistent  CKD,  and EPO < 200  pt may benefit from SUREKHA inj  for h/h support   in the future   call with questions [phone]

## 2023-06-14 NOTE — SWALLOW BEDSIDE ASSESSMENT ADULT - SLP PERTINENT HISTORY OF CURRENT PROBLEM
64 y/o M, A&Ox3, bedbound, chronic daley catheter with CKD (baseline creat 3.8), COPD, CHFpEF, BPH, PVD, Lymphedema, decubitus ulcers (Sacrum/gluteal) , Anemia, poly-neuropathy, poly-arthritis, hypothyroidism, seizures and Bipolar disorder was BIB EMS from [place] for abdominal pain, [place]. As per EMR, Mr. Rhoades states that he developed acute onset sharp abdominal pain 4 days ago a/w "brown" emesis (witnessed by EMS). Unable to tolerate food/drink x 4 days. Head CT (5/28): No acute intracranial hemorrhage, mass effect, or midline shift. Paranasal sinus mucosal disease. The extent of ventricular and sulcal prominence appears increased compared with 12/4/2022, indeterminate clinical significance.
62 y/o M, A&Ox3, bedbound, chronic daley catheter with CKD (baseline creat 3.8), COPD, CHFpEF, BPH, PVD, Lymphedema, decubitus ulcers (Sacrum/gluteal) , Anemia, poly-neuropathy, poly-arthritis, hypothyroidism, seizures and Bipolar disorder was BIB EMS from [place] for abdominal pain, [place]. As per EMR, Mr. Rhoades states that he developed acute onset sharp abdominal pain 4 days ago a/w "brown" emesis (witnessed by EMS). Unable to tolerate food/drink x 4 days.

## 2023-06-14 NOTE — PROGRESS NOTE ADULT - ASSESSMENT
complete note to follow    #VTE Prophylaxis     Assessment and Plan:   · Assessment	    #Normocytic Anemia  #Thrombocytopenia  GNR sepsis, hypotension d/t UTI, esophagitis  SHEYLA on CKD  repeat BCx now (-)  Hgb=9.0 stable  no active bleeding/ecchymosis  LFT's nl  Plt=88k-->81k stable  elevated PT/PTT with borderline fibrinogen and normal D-dimer not c/w DIC  PF4 (-)  peripheral smear no schistocytes  hemolysis (-)  iron panel c/w ACD, hyperferritinemia likely d/t reactive process  Low TSH  Rec's:  -etiology thrombocytopenia multi-factorial GNR sepsis/Esophagitis/on valproate sodium  -etiology anemia SHEYLA on CKD, infxn, nutritional, drug induced  -H/H stable, s/p repeat EGD resolving esophagitis, no bleeding, cont PPI  -Platelets stable 71k  -check Mixing study, hepatitis panel, repeat ferritin, check B/12, folate, ROSANNE, FABRICE pending SPEP nl, RF nl  -Check fibrinogen, bemxk=121, D-dimer daily  -Cryo indicated if fibrinogen <100   -PRBC transfusion if Hgb <7.0 or symptomatic  -GI on board, pt with esophagitis, on PPI  -Transfuse SDP if Plt <20k in setting of sepsis  -avoid non-essential meds that can exacerbate plt drop (i.e. H2 blocker)  -monitor CBC daily  -always check post SDP transfusion plt level within 1 hour to confirm adequate response    Thank you for the referral. Will continue to monitor the patient.  Please call with any questions [phone]  Above reviewed with Attending Dr. Gamez  [facility]  [address] [facility], [address], [place], [place]  [phone]  *Note not finalized until signed by Attending Physician         Assessment and Plan:   · Assessment	    #Normocytic Anemia  #Thrombocytopenia  GNR sepsis, hypotension d/t UTI, esophagitis  SHEYLA on CKD  repeat BCx now (-)  Hgb=9.0 stable  no active bleeding/ecchymosis  LFT's nl  Plt=88k-->81k stable  elevated PT/PTT with borderline fibrinogen and normal D-dimer not c/w DIC  PF4 (-)  peripheral smear no schistocytes  hemolysis (-)  iron panel c/w ACD, hyperferritinemia likely d/t reactive process  Low TSH  Rec's:  -etiology thrombocytopenia multi-factorial GNR sepsis/Esophagitis/on valproate sodium  -etiology anemia SHEYLA on CKD, infxn, nutritional, drug induced  -H/H stable, s/p repeat EGD resolving esophagitis, no bleeding, cont PPI  -Platelets stable 71k  -check Mixing study, hepatitis panel (-), repeat [id number], B/12, folate, ROSANNE, FABRICE pending SPEP nl, RF nl  -Check fibrinogen daily, kepdg=704, D-dimer daily  -Cryo indicated if fibrinogen <100   -PRBC transfusion if Hgb <7.0 or symptomatic  -GI on board, pt with esophagitis, on PPI  -Transfuse SDP if Plt <20k in setting of sepsis  -avoid non-essential meds that can exacerbate plt drop (i.e. H2 blocker)  -monitor CBC daily  -always check post SDP transfusion plt level within 1 hour to confirm adequate response    Thank you for the referral. Will continue to monitor the patient.  Please call with any questions [phone]  Above reviewed with Attending Dr. Gamez  [facility]  [address] [facility], [address], [place], [place]  [phone]  *Note not finalized until signed by Attending Physician

## 2023-06-14 NOTE — PROGRESS NOTE ADULT - NUTRITIONAL ASSESSMENT
This patient has been assessed with a concern for Malnutrition and has been determined to have a diagnosis/diagnoses of Moderate protein-calorie malnutrition.    This patient is being managed with:   Diet Consistent Carbohydrate Clear Liquid-  Mildly Thick Liquids (MILDTHICKLIQS)  Entered: Jun 14 2023  2:51PM

## 2023-06-14 NOTE — SWALLOW BEDSIDE ASSESSMENT ADULT - CONSISTENCIES ADMINISTERED
x2 ice x1 straw sip apple juice; x2 cup sip water x1 tspn/minced & moist x2 tspn/pureed x5 tspn/mildly thick

## 2023-06-15 NOTE — PROGRESS NOTE ADULT - PROBLEM SELECTOR PLAN 5
Continue buproprion XL 150mg daily  Trazodone 50mg HS  Continue Depakote  Psych rec to continue depakote.

## 2023-06-15 NOTE — PROGRESS NOTE ADULT - SUBJECTIVE AND OBJECTIVE BOX
GAYLA PEARCE  63y  Patient is a 63y old  Male who presents with a chief complaint of sepsis (15 Carlin 2023 17:24)  Followed for SHEYLA on CKD.  Sent from ICU yesterday.    HEALTH ISSUES - PROBLEM Dx:  GIB (gastrointestinal bleeding)    Sepsis    Anemia    Hypothyroid    CKD (chronic kidney disease)    BPH (benign prostatic hyperplasia)    Bipolar disorder    Advance care planning    Seizure    Esophagitis    Hypokalemia    Ambulatory dysfunction    Prophylactic measure    Electrolyte depletion    Acute encephalopathy    Acute kidney injury superimposed on CKD    Ulcerative esophagitis    Decubitus ulcers    Functional quadriplegia    Moderate protein-calorie malnutrition    Encounter for palliative care    Chronic heart failure with preserved ejection fraction (HFpEF)    Sepsis with acute hypoxic respiratory failure    Protein calorie malnutrition    Palliative care encounter    SHEYLA (acute kidney injury)    Palliative care encounter    Debility    Severe protein-calorie malnutrition    Encephalopathy, unspecified    Thrombocytopenia          MEDICATIONS  (STANDING):  albuterol    90 MICROgram(s) HFA Inhaler 2 Puff(s) Inhalation every 6 hours  buPROPion XL (24-Hour) . 150 milliGRAM(s) Oral daily  divalproex  milliGRAM(s) Oral two times a day  dorzolamide 2% Ophthalmic Solution 1 Drop(s) Right EYE <User Schedule>  finasteride 5 milliGRAM(s) Oral daily  metoclopramide Injectable 10 milliGRAM(s) IV Push every 6 hours  nystatin Powder 1 Application(s) Topical two times a day  pantoprazole  Injectable 40 milliGRAM(s) IV Push every 24 hours  sodium chloride 0.45%. 1000 milliLiter(s) (100 mL/Hr) IV Continuous <Continuous>  sucralfate suspension 1 Gram(s) Oral four times a day  tamsulosin 0.4 milliGRAM(s) Oral at bedtime  traZODone 50 milliGRAM(s) Oral at bedtime  vitamin A &amp; D Ointment 1 Application(s) Topical at bedtime    MEDICATIONS  (PRN):  ondansetron Injectable 4 milliGRAM(s) IV Push every 8 hours PRN Nausea and/or Vomiting  sodium chloride 0.9% lock flush 10 milliLiter(s) IV Push every 1 hour PRN Pre/post blood products, medications, blood draw, and to maintain line patency    Vital Signs Last 24 Hrs  T(C): 35.4 (15 Carlin 2023 16:15), Max: 36.8 (14 Jun 2023 20:00)  T(F): 95.7 (15 Carlin 2023 16:15), Max: 98.3 (14 Jun 2023 20:00)  HR: 89 (15 Carlin 2023 16:15) (82 - 98)  BP: 111/57 (15 Carlin 2023 16:15) (109/61 - 118/67)  BP(mean): 85 (14 Jun 2023 20:00) (85 - 85)  RR: 19 (15 Carlin 2023 16:15) (18 - 25)  SpO2: 98% (15 Carlin 2023 16:15) (98% - 100%)    Parameters below as of 15 Carlin 2023 16:15  Patient On (Oxygen Delivery Method): room air      Daily     Daily     PHYSICAL EXAM:  Constitutional:   appears comfortable and not distressed. Not diaphoretic.    Neck:  The thyroid is normal. Trachea is midline.     Breasts: Normal examination.    Respiratory: The lungs are clear to auscultation. No dullness and expansion is normal.    Cardiovascular: S1 and S2 are normal. No mummurs, rubs or gallops are present.    Gastrointestinal: The abdomen is soft. No tenderness is present. No masses are present. Bowel sounds are normal.    Genitourinary: The bladder is not distended. No CVA tenderness is present.    Extremities: No edema is noted. No deformities are present.    Neurological: Cognition is normal. Tone, power and sensation are normal. Gait is steady.    Skin: No leasions are seen  or palpated.    Lymph Nodes: No lymphadenopathy is present.    Psychiatric: Mood is appropriate. No hallucinations or flight of ideas are noted.                              8.5    5.05  )-----------( 46       ( 15 Carlin 2023 06:35 )             27.5     06-15    146<H>  |  117<H>  |  48<H>  ----------------------------<  95  3.8   |  17<L>  |  4.88<H>    Ca    9.3      15 Carlin 2023 06:35  Phos  2.8     06-15  Mg     2.1     06-15    TPro  4.3<L>  /  Alb  x   /  TBili  x   /  DBili  x   /  AST  x   /  ALT  x   /  AlkPhos  x   06-14

## 2023-06-15 NOTE — PROGRESS NOTE ADULT - NUTRITIONAL ASSESSMENT
This patient has been assessed with a concern for Malnutrition and has been determined to have a diagnosis/diagnoses of   Severe Protein calorie Malnutrition    This patient is being managed with:   Diet Consistent Carbohydrate Clear Liquid-  Mildly Thick Liquids (MILDTHICKLIQS)  Entered: Jun 14 2023  2:51PM

## 2023-06-15 NOTE — PROGRESS NOTE ADULT - PROBLEM SELECTOR PLAN 3
S/P EGD  5/30 and 6/13  +Esophagitis  Continue reglan 10mg every 6 hours  Zofran 4mg prn  Sucralfate 1Gm 4 times daily  Pantoprazole 40mg daily.  1 episode of vomiting today.  Encourage fluid intake.

## 2023-06-15 NOTE — PROGRESS NOTE ADULT - PROBLEM SELECTOR PLAN 8
Chronic daley Failed multiple TOV. Chronic daley Failed multiple TOV.  Will need to restart flomax and finasteride when vomiting stops.

## 2023-06-15 NOTE — PROGRESS NOTE ADULT - SUBJECTIVE AND OBJECTIVE BOX
Patient is a 63y old  Male who presents with a chief complaint of sepsis       SUBJECTIVE / OVERNIGHT EVENTS: events noted. DG to floor      MEDICATIONS  (STANDING):  albuterol    90 MICROgram(s) HFA Inhaler 2 Puff(s) Inhalation every 6 hours  buPROPion XL (24-Hour) . 150 milliGRAM(s) Oral daily  divalproex  milliGRAM(s) Oral two times a day  dorzolamide 2% Ophthalmic Solution 1 Drop(s) Right EYE <User Schedule>  finasteride 5 milliGRAM(s) Oral daily  heparin   Injectable 5000 Unit(s) SubCutaneous every 8 hours  metoclopramide Injectable 10 milliGRAM(s) IV Push every 6 hours  nystatin Powder 1 Application(s) Topical two times a day  pantoprazole  Injectable 40 milliGRAM(s) IV Push every 24 hours  sodium chloride 0.45%. 1000 milliLiter(s) (100 mL/Hr) IV Continuous <Continuous>  sucralfate suspension 1 Gram(s) Oral four times a day  tamsulosin 0.4 milliGRAM(s) Oral at bedtime  traZODone 50 milliGRAM(s) Oral at bedtime  vitamin A &amp; D Ointment 1 Application(s) Topical at bedtime    MEDICATIONS  (PRN):  ondansetron Injectable 4 milliGRAM(s) IV Push every 8 hours PRN Nausea and/or Vomiting  sodium chloride 0.9% lock flush 10 milliLiter(s) IV Push every 1 hour PRN Pre/post blood products, medications, blood draw, and to maintain line patency    CAPILLARY BLOOD GLUCOSE      POCT Blood Glucose.: 101 mg/dL (14 Jun 2023 11:14)    I&O's Summary    14 Jun 2023 07:01  -  15 Carlin 2023 07:00  --------------------------------------------------------  IN: 200 mL / OUT: 700 mL / NET: -500 mL        PHYSICAL EXAM:  Vital Signs Last 24 Hrs  T(C): 36.2 (15 Carlin 2023 05:10), Max: 36.8 (14 Jun 2023 20:00)  T(F): 97.2 (15 Carlin 2023 05:10), Max: 98.3 (14 Jun 2023 20:00)  HR: 98 (15 Carlin 2023 05:10) (82 - 103)  BP: 115/61 (15 Carlin 2023 05:10) (112/71 - 120/59)  BP(mean): 85 (14 Jun 2023 20:00) (75 - 85)  RR: 20 (15 Carlin 2023 05:10) (18 - 36)  SpO2: 98% (15 Carlin 2023 05:10) (98% - 100%)    Parameters below as of 15 Carlin 2023 05:10  Patient On (Oxygen Delivery Method): room air      GEN: NAD; A and O x 2,lethargic  LUNGS: scattered rhonchi  HEART: S1 S2  ABDOMEN: soft, non-tender, non-distended, + BS  EXTREMITIES: B/LE edema, hyperpigmentation          LABS:                        8.5    5.05  )-----------( 46       ( 15 Carlin 2023 06:35 )             27.5     06-15    146<H>  |  117<H>  |  48<H>  ----------------------------<  95  3.8   |  17<L>  |  4.88<H>    Ca    9.3      15 Carlin 2023 06:35  Phos  2.8     06-15  Mg     2.1     06-15    TPro  4.3<L>  /  Alb  x   /  TBili  x   /  DBili  x   /  AST  x   /  ALT  x   /  AlkPhos  x   06-14    PT/INR - ( 14 Jun 2023 03:12 )   PT: 20.3 sec;   INR: 1.70 ratio         PTT - ( 14 Jun 2023 03:12 )  PTT:43.7 sec          SARS-CoV-2: NotDetec (10 Carlin 2023 10:35)  COVID-19 PCR: NotDetec (07 Jun 2023 06:43)  COVID-19 PCR: NotDetec (04 Jun 2023 18:40)  SARS-CoV-2: NotDetec (28 May 2023 21:02)      RADIOLOGY & ADDITIONAL TESTS:  Imaging from Last 24 Hours:    Electrocardiogram/QTc Interval:    COORDINATION OF CARE:  Care Discussed with Consultants/Other Providers:       Patient is a 63y old  Male who presents with a chief complaint of sepsis       SUBJECTIVE / OVERNIGHT EVENTS: events noted. DG to floor      MEDICATIONS  (STANDING):  albuterol    90 MICROgram(s) HFA Inhaler 2 Puff(s) Inhalation every 6 hours  buPROPion XL (24-Hour) . 150 milliGRAM(s) Oral daily  divalproex  milliGRAM(s) Oral two times a day  dorzolamide 2% Ophthalmic Solution 1 Drop(s) Right EYE <User Schedule>  finasteride 5 milliGRAM(s) Oral daily  heparin   Injectable 5000 Unit(s) SubCutaneous every 8 hours  metoclopramide Injectable 10 milliGRAM(s) IV Push every 6 hours  nystatin Powder 1 Application(s) Topical two times a day  pantoprazole  Injectable 40 milliGRAM(s) IV Push every 24 hours  sodium chloride 0.45%. 1000 milliLiter(s) (100 mL/Hr) IV Continuous <Continuous>  sucralfate suspension 1 Gram(s) Oral four times a day  tamsulosin 0.4 milliGRAM(s) Oral at bedtime  traZODone 50 milliGRAM(s) Oral at bedtime  vitamin A &amp; D Ointment 1 Application(s) Topical at bedtime    MEDICATIONS  (PRN):  ondansetron Injectable 4 milliGRAM(s) IV Push every 8 hours PRN Nausea and/or Vomiting  sodium chloride 0.9% lock flush 10 milliLiter(s) IV Push every 1 hour PRN Pre/post blood products, medications, blood draw, and to maintain line patency    CAPILLARY BLOOD GLUCOSE      POCT Blood Glucose.: 101 mg/dL (14 Jun 2023 11:14)    I&O's Summary    14 Jun 2023 07:01  -  15 Carlin 2023 07:00  --------------------------------------------------------  IN: 200 mL / OUT: 700 mL / NET: -500 mL        PHYSICAL EXAM:  Vital Signs Last 24 Hrs  T(C): 36.2 (15 Carlin 2023 05:10), Max: 36.8 (14 Jun 2023 20:00)  T(F): 97.2 (15 Carlin 2023 05:10), Max: 98.3 (14 Jun 2023 20:00)  HR: 98 (15 Carlin 2023 05:10) (82 - 103)  BP: 115/61 (15 Carlin 2023 05:10) (112/71 - 120/59)  BP(mean): 85 (14 Jun 2023 20:00) (75 - 85)  RR: 20 (15 Carlin 2023 05:10) (18 - 36)  SpO2: 98% (15 Carlin 2023 05:10) (98% - 100%)    Parameters below as of 15 Carlin 2023 05:10  Patient On (Oxygen Delivery Method): room air      GEN: NAD; A and O x 2,lethargic  LUNGS: scattered rhonchi  HEART: S1 S2  ABDOMEN: soft, non-tender, non-distended, + BS  EXTREMITIES: B/LE edema, hyperpigmentation          LABS:                        8.5    5.05  )-----------( 46       ( 15 Carlin 2023 06:35 )             27.5     06-15    146<H>  |  117<H>  |  48<H>  ----------------------------<  95  3.8   |  17<L>  |  4.88<H>    Ca    9.3      15 Carlin 2023 06:35  Phos  2.8     06-15  Mg     2.1     06-15    TPro  4.3<L>  /  Alb  x   /  TBili  x   /  DBili  x   /  AST  x   /  ALT  x   /  AlkPhos  x   06-14    PT/INR - ( 14 Jun 2023 03:12 )   PT: 20.3 sec;   INR: 1.70 ratio         PTT - ( 14 Jun 2023 03:12 )  PTT:43.7 sec          SARS-CoV-2: NotDetec (10 Carlin 2023 10:35)  COVID-19 PCR: NotDetec (07 Jun 2023 06:43)  COVID-19 PCR: NotDetec (04 Jun 2023 18:40)  SARS-CoV-2: NotDetec (28 May 2023 21:02)

## 2023-06-15 NOTE — PROGRESS NOTE ADULT - ASSESSMENT
Assessment and Plan:   · Assessment	    #Normocytic Anemia  #Thrombocytopenia  GNR sepsis, hypotension d/t UTI, esophagitis  SHEYLA on CKD  repeat BCx now (-)  Hgb=8.5 stable  no active bleeding/ecchymosis  LFT's nl  Plt=88k-->81k->71-->46k  elevated PT/PTT  PF4 (-)  peripheral smear NO schistocytes  hemolysis (-)  iron panel c/w ACD, hyperferritinemia likely d/t reactive process  Low TSH  Rec's:  -etiology thrombocytopenia multi-factorial GNR sepsis/Esophagitis/on valproate sodium  -etiology anemia SHEYLA on CKD, infxn, nutritional, drug induced  -H/H stable, s/p repeat EGD resolving esophagitis, no bleeding, cont PPI  -Platelet trending down and now fibrinogen 93 and D-dimer increased to 282, DIC w/u +  ------>give cryo 10u  ----->d/c heparin  -INR yest 1.7, repeat, if active bleeding consider giving vitamin K  -supportive care  -pt is on depakote which can cause thrombocytopenia  -incorrect Mixing study done, repeat PT/PTT mixing study, repeat PT/PTT, LFT's  -hepatitis panel (-), repeat [id number] (pt does have polyarthritis), B/12, folate, ROSANNE, FABRICE nl, SPEP nl, RF nl  -consider checking inflammatory markers  -Check fibrinogen daily, D-dimer daily  -Cryo indicated if fibrinogen <100   -PRBC transfusion if Hgb <7.0 or symptomatic  -GI on board, pt with esophagitis, on PPI  -Transfuse SDP if Plt <20k in setting of sepsis  -avoid non-essential meds that can exacerbate plt drop (i.e. H2 blocker)  -monitor CBC daily  -always check post SDP transfusion plt level within 1 hour to confirm adequate response    Thank you for the referral. Will continue to monitor the patient.  Please call with any questions [phone]  Above reviewed with Attending Dr. Vera POON/[facility]  [address] [facility], [address], [place], [place]  [phone]  *Note not finalized until signed by Attending Physician         Assessment and Plan:   · Assessment	    #Normocytic Anemia  #Thrombocytopenia  GNR sepsis, hypotension d/t UTI, esophagitis  SHEYLA on CKD  repeat BCx now (-)  Hgb=8.5 stable  no active bleeding/ecchymosis  LFT's nl  Plt=88k-->81k->71-->46k  elevated PT/PTT  PF4 (-)  peripheral smear NO schistocytes  hemolysis (-)  iron panel c/w ACD, hyperferritinemia likely d/t reactive process  Low TSH  Rec's:  -etiology thrombocytopenia multi-factorial GNR sepsis/Esophagitis/on valproate sodium  -etiology anemia SHEYLA on CKD, infxn, nutritional, drug induced  -H/H stable, s/p repeat EGD resolving esophagitis, no bleeding, cont PPI  -Platelet trending down and now fibrinogen 93 and D-dimer increased to 282, DIC w/u +  ------>give cryo 5u then repeat fibrinogen 2 hours after infusion  ----->d/c heparin  -INR yest 1.7, repeat, if active bleeding consider giving vitamin K  -supportive care  -pt is on depakote which can cause thrombocytopenia  -incorrect Mixing study done, repeat PT/PTT mixing study, repeat PT/PTT, LFT's  -hepatitis panel (-), repeat [id number] (pt does have polyarthritis), B/12, folate, ROSANNE, FABRICE nl, SPEP nl, RF nl  -consider checking inflammatory markers  -Check fibrinogen daily, D-dimer daily  -Cryo indicated if fibrinogen <100   -PRBC transfusion if Hgb <7.0 or symptomatic  -GI on board, pt with esophagitis, on PPI  -Transfuse SDP if Plt <20k in setting of sepsis  -avoid non-essential meds that can exacerbate plt drop (i.e. H2 blocker)  -monitor CBC daily  -always check post SDP transfusion plt level within 1 hour to confirm adequate response    Thank you for the referral. Will continue to monitor the patient.  Please call with any questions [phone]  Above reviewed with Attending Dr. Vera POON/[facility]  [address] [facility], [address], [place], [place]  [phone]  *Note not finalized until signed by Attending Physician

## 2023-06-15 NOTE — PROGRESS NOTE ADULT - PROBLEM SELECTOR PLAN 9
CKD stage 4  Daily CMP and phos level  Dr Olivier following  Continue to trend creatine.  Strict I&Os  Encourage water intake.

## 2023-06-15 NOTE — PROGRESS NOTE ADULT - SUBJECTIVE AND OBJECTIVE BOX
GAYLA PEARCE    SCU progress note    INTERVAL HPI/OVERNIGHT EVENTS: ***Transferred overnight from ICU to SCU  HPI: 63M, from [place], AOx3, bedbound, chronic FC, h/o obesity, HTN, HLD, PVD, Seizures, CKD (base SCr 3.8), anemia, BPH, Lymphedema, hypothyroidism, HFpEF, COPD, decubitus ulcer (sacrum/gluteal), polyneuropathy, polyarthritis, bipolar disorder, p/w abd pain a/w "brown” emesis and food intolerance x4d. Admitted for complicated UTI + suspected GIB.  Patient initially had BP of 88/62 but it improved with IVF. Patient admitted to poor oral intake for the past 4 days due to nausea and vomiting.  CT head was negative for acute changes. CT abd showed circumferential wall thickening of distal esophagus. PPI was started.  Patient complains of persistent nausea without vomiting. Reglan was started TID to improve GI motility. Clear liquid diet was started. Hemoglobin was stable around his baseline ~9. Pt had thrombocytopenia with concern for cefepime induced thrombobytopenia.  We are following daily d-dimers and dribrinogen levels with plan to transfuse 10units cryo if PLTs < 100 or if PLTs < 150 with bleeding. PLTs have been stable in 70-50's and Heparin subQ was started for DVT prophylaxis.    Patient had chronic hatfield from Dec 2022 and he failed TOV. UA was positive. Patient was started on Cefepime. Blood cultures and urine cultures were sent.  Wound care was consulted for sacral pressure ulcer.    Patient presented with BUN/Cr 103/4.98 (Baseline Cr 3.6~3.8). Patient received IVF and SCr has been slowly improving. Dr. Valenzuela nephrologist is on board.    Patient presented with Hypokelamia with K 3.1->2.8, likely due to vomiting and decreased PO intake. It was repleted IV and oral.      DNR [ ]   DNI  [  ]  FULL CODE    Covid - 19 PCR: NEGATIVE 6/10    The 4Ms    What Matters Most: see GOC  Age appropriate Medications/Screen for High Risk Medication: Yes  Mentation: see CAM below  Mobility: defer to physical exam    The Confusion Assessment Method (CAM) Diagnostic Algorithm     1: Acute Onset or Fluctuating Course  - Is there evidence of an acute change in mental status from the patient’s baseline? Did the (abnormal) behavior  fluctuate during the day, that is, tend to come and go, or increase and decrease in severity?  [ ] YES [X ] NO     2: Inattention  - Did the patient have difficulty focusing attention, being easily distractible, or having difficulty keeping track of what was being said?  [ ] YES [X ] NO     3: Disorganized thinking  -Was the patient’s thinking disorganized or incoherent, such as rambling or irrelevant conversation, unclear or illogical flow of ideas, or unpredictable switching from subject to subject?  [ ] YES [X ] NO    4: Altered Level of consciousness?  [ ] YES [X ] NO    The diagnosis of delirium by CAM requires the presence of features 1 and 2 and either 3 or 4.    PRESSORS: [ ] YES [X ] NO    Cardiovascular:  Heart Failure  Acute   Acute on Chronic  Chronic         Pulmonary:  albuterol    90 MICROgram(s) HFA Inhaler 2 Puff(s) Inhalation every 6 hours    Hematalogic:    Other:  buPROPion XL (24-Hour) . 150 milliGRAM(s) Oral daily  divalproex  milliGRAM(s) Oral two times a day  dorzolamide 2% Ophthalmic Solution 1 Drop(s) Right EYE <User Schedule>  finasteride 5 milliGRAM(s) Oral daily  metoclopramide Injectable 10 milliGRAM(s) IV Push every 6 hours  nystatin Powder 1 Application(s) Topical two times a day  ondansetron Injectable 4 milliGRAM(s) IV Push every 8 hours PRN  pantoprazole  Injectable 40 milliGRAM(s) IV Push every 24 hours  sodium chloride 0.45%. 1000 milliLiter(s) IV Continuous <Continuous>  sodium chloride 0.9% lock flush 10 milliLiter(s) IV Push every 1 hour PRN  sucralfate suspension 1 Gram(s) Oral four times a day  tamsulosin 0.4 milliGRAM(s) Oral at bedtime  traZODone 50 milliGRAM(s) Oral at bedtime  vitamin A &amp; D Ointment 1 Application(s) Topical at bedtime    albuterol    90 MICROgram(s) HFA Inhaler 2 Puff(s) Inhalation every 6 hours  buPROPion XL (24-Hour) . 150 milliGRAM(s) Oral daily  divalproex  milliGRAM(s) Oral two times a day  dorzolamide 2% Ophthalmic Solution 1 Drop(s) Right EYE <User Schedule>  finasteride 5 milliGRAM(s) Oral daily  metoclopramide Injectable 10 milliGRAM(s) IV Push every 6 hours  nystatin Powder 1 Application(s) Topical two times a day  ondansetron Injectable 4 milliGRAM(s) IV Push every 8 hours PRN  pantoprazole  Injectable 40 milliGRAM(s) IV Push every 24 hours  sodium chloride 0.45%. 1000 milliLiter(s) IV Continuous <Continuous>  sodium chloride 0.9% lock flush 10 milliLiter(s) IV Push every 1 hour PRN  sucralfate suspension 1 Gram(s) Oral four times a day  tamsulosin 0.4 milliGRAM(s) Oral at bedtime  traZODone 50 milliGRAM(s) Oral at bedtime  vitamin A &amp; D Ointment 1 Application(s) Topical at bedtime    Drug Dosing Weight  Height (cm): 172.7 (09 Jun 2023 21:00)  Weight (kg): 84.2 (09 Jun 2023 21:00)  BMI (kg/m2): 28.2 (09 Jun 2023 21:00)  BSA (m2): 1.98 (09 Jun 2023 21:00)    CENTRAL LINE: [ ] YES [X ] NO  LOCATION:   DATE INSERTED:  REMOVE: [ ] YES [ ] NO  EXPLAIN:    HATFIELD: [X ] YES [ ] NO    DATE INSERTED:  REMOVE:  [ ] YES [X ] NO  EXPLAIN:  CHRONIC HATFIELD    PAST MEDICAL & SURGICAL HISTORY:  Hypothyroid      Hyperlipidemia      Ambulatory dysfunction      Anemia      History of BPH      CKD (chronic kidney disease)      Chronic obstructive pulmonary disease (COPD)      Bipolar disorder      HLD (hyperlipidemia)      BPH (benign prostatic hyperplasia)      Chronic diastolic congestive heart failure      Lymphedema      Chronic leg pain      No significant past surgical history                  06-14 @ 07:01  -  06-15 @ 07:00  --------------------------------------------------------  IN: 200 mL / OUT: 700 mL / NET: -500 mL            PHYSICAL EXAM:    GENERAL: NAD, well-groomed, well-developed  HEAD:  Atraumatic, Normocephalic  EYES: EOMI, PERRLA, conjunctiva and sclera clear  ENMT: No tonsillar erythema, exudates  NECK: Supple, No JVD  NERVOUS SYSTEM:  Alert & Oriented X3, Follows commands. Moving all extremities. Extremities weak bilaterally.  CHEST/LUNG: Diminished breath sounds bilaterally with scattered rhonchi noted.  HEART: Regular rate and rhythm; No murmurs, rubs, or gallops  ABDOMEN: Soft, Nontender, Nondistended; Bowel sounds present  EXTREMITIES: Chronic venous stasis bilateral lower extremities.   2+ Peripheral Pulses, No clubbing, cyanosis, or edema  LYMPH: No lymphadenopathy noted  SKIN: Chronic venous stasis bilateral lower extremities.      LABS:  CBC Full  -  ( 15 Carlin 2023 06:35 )  WBC Count : 5.05 K/uL  RBC Count : 2.94 M/uL  Hemoglobin : 8.5 g/dL  Hematocrit : 27.5 %  Platelet Count - Automated : 46 K/uL  Mean Cell Volume : 93.5 fl  Mean Cell Hemoglobin : 28.9 pg  Mean Cell Hemoglobin Concentration : 30.9 gm/dL  Auto Neutrophil # : 3.04 K/uL  Auto Lymphocyte # : 1.21 K/uL  Auto Monocyte # : 0.45 K/uL  Auto Eosinophil # : 0.15 K/uL  Auto Basophil # : 0.01 K/uL  Auto Neutrophil % : 60.1 %  Auto Lymphocyte % : 24.0 %  Auto Monocyte % : 8.9 %  Auto Eosinophil % : 3.0 %  Auto Basophil % : 0.2 %    06-15    146<H>  |  117<H>  |  48<H>  ----------------------------<  95  3.8   |  17<L>  |  4.88<H>    Ca    9.3      15 Carlin 2023 06:35  Phos  2.8     06-15  Mg     2.1     06-15    TPro  4.3<L>  /  Alb  x   /  TBili  x   /  DBili  x   /  AST  x   /  ALT  x   /  AlkPhos  x   06-14    PT/INR - ( 14 Jun 2023 03:12 )   PT: 20.3 sec;   INR: 1.70 ratio         PTT - ( 14 Jun 2023 03:12 )  PTT:43.7 sec          [  ]  DVT Prophylaxis  [  ]  Nutrition, Brand, Rate         Goal Rate        Abnormal Nutritional Status -  Malnutrition   Cachexia      Morbid Obesity BMI >/=40    RADIOLOGY & ADDITIONAL STUDIES:  ***  < from: Xray Chest 1 View- PORTABLE-Routine (Xray Chest 1 View- PORTABLE-Routine in AM.) (06.14.23 @ 09:37) >  6/13/2023 8:27 AM:  The heart is normal in size.  There are patchy bibasilar opacities.  There are no pleural effusions.  There is no pneumothorax.    6/14/2023 8:51 AM:  The left IJ catheter was removed. There is improved aeration at the left   lung base.  IMPRESSION:    Patchy right basilar opacities, possibly atelectasis or pneumonia. The   left lung base has improved.    --- End of Report ---    < end of copied text >  < from: CT Abdomen and Pelvis No Cont (05.28.23 @ 22:34) >  CONTRAST/COMPLICATIONS:  IV Contrast: NONE  Evaluation of the visceral organs is limited without   intravenous contrast  Oral Contrast: NONE  Complications: None reported at time of study completion    PROCEDURE:  CT of the Abdomen and Pelvis was performed.  Sagittal and coronal reformats were performed.    FINDINGS:  LOWER CHEST: Bibasilar subsegmental atelectasis. Circumferential wall   thickening of the distal esophagus.    LIVER: Within normal limits.  BILE DUCTS: Normal caliber.  GALLBLADDER: Cholecystectomy.  SPLEEN: Within normal limits.  PANCREAS: Within normal limits.  ADRENALS: Within normal limits.  KIDNEYS/URETERS: Redemonstrated moderate nonspecific bilateral   perinephric fat stranding. No hydronephrosis.    BLADDER: Collapsed around a Hatfield catheter balloon. Intravesicular gas   secondary to instrumentation. Circumferential wall thickening.  REPRODUCTIVE ORGANS: Prostate within normal limits.    BOWEL: No bowel obstruction or inflammation. Scattered colonic   diverticulosis without diverticulitis. Appendix is normal.  PERITONEUM: No ascites.  VESSELS: Infrarenal IVC filter.  RETROPERITONEUM/LYMPH NODES: No lymphadenopathy.  ABDOMINAL WALL: Small bilateral fat-containing inguinal hernias.  BONES: Old right rib fractures. Severe right hip degenerative change with   remodeling of the right femoral head. Degenerative changes of the spine.    IMPRESSION:  1. No bowel obstruction or inflammation.  2. Circumferential wall thickening of the distal esophagus which could be   due to an esophagitis.  3. Circumferential urinary bladder wall thickening which could be due to   underdistention versus a cystitis. Correlate with urinalysis.        --- End of Report ---      < end of copied text >  < from: CT Head No Cont (05.28.23 @ 22:33) >  COMPARISON STUDY: 12/4/2022    FINDINGS:    PARENCHYMA AND VENTRICLES: No acute intracranial hemorrhage, mass effect,   or midline shift. No hydrocephalus. Prominence of the sulci and   ventricles, consistent with age-related parenchymal volume loss; the   extent of ventricular and sulcal prominence appears somewhat increased   compared to 12/4/2022. Small vessel white matter changes.  EXTRA-AXIAL: No abnormal extraaxial collection.  PARANASAL SINUSES: Layering fluid in the right sphenoid sinus and   additional mild scattered mucosal thickening, as on prior.  TYMPANOMASTOID CAVITIES: Within normal limits.  ORBITS: Bilateral cataract surgery.  CALVARIUM: Within normal limits.  MISCELLANEOUS: Intracranial atherosclerosis.    IMPRESSION:  No acute intracranial hemorrhage, mass effect, or midline shift.  Paranasal sinus mucosal disease.  The extent of ventricular and sulcal prominence appears increased   compared with 12/4/2022, indeterminate clinical significance.    --- End of Report ---    < end of copied text >    Goals of Care Discussion with Family/Proxy/Other   - see note from 5/30 and 6/13

## 2023-06-15 NOTE — PROGRESS NOTE ADULT - ASSESSMENT
Bacteremia - with Pseudomonas  UTI - complicated      Plan - Completed antibiotic course  reconsult prn.

## 2023-06-15 NOTE — PROGRESS NOTE ADULT - PROBLEM SELECTOR PLAN 10
Heparin d/c secondary to dropping platelets. Heparin d/c secondary to dropping platelets.  Cryo 5U ordered. Repeat fibrinogen level 2 hours after infusion.  SCB for DVT prophylaxis.  Daily CBC ,CMP, coags, fibrinogen, d-dimer  F/U inflammatory markers.

## 2023-06-15 NOTE — PROGRESS NOTE ADULT - SUBJECTIVE AND OBJECTIVE BOX
Time of Visit:  Patient seen and examined. pa tis more alert and talking     MEDICATIONS  (STANDING):  albuterol    90 MICROgram(s) HFA Inhaler 2 Puff(s) Inhalation every 6 hours  buPROPion XL (24-Hour) . 150 milliGRAM(s) Oral daily  divalproex  milliGRAM(s) Oral two times a day  dorzolamide 2% Ophthalmic Solution 1 Drop(s) Right EYE <User Schedule>  finasteride 5 milliGRAM(s) Oral daily  metoclopramide Injectable 10 milliGRAM(s) IV Push every 6 hours  nystatin Powder 1 Application(s) Topical two times a day  pantoprazole  Injectable 40 milliGRAM(s) IV Push every 24 hours  sodium chloride 0.45%. 1000 milliLiter(s) (100 mL/Hr) IV Continuous <Continuous>  sucralfate suspension 1 Gram(s) Oral four times a day  tamsulosin 0.4 milliGRAM(s) Oral at bedtime  traZODone 50 milliGRAM(s) Oral at bedtime  vitamin A &amp; D Ointment 1 Application(s) Topical at bedtime      MEDICATIONS  (PRN):  ondansetron Injectable 4 milliGRAM(s) IV Push every 8 hours PRN Nausea and/or Vomiting  sodium chloride 0.9% lock flush 10 milliLiter(s) IV Push every 1 hour PRN Pre/post blood products, medications, blood draw, and to maintain line patency       Medications up to date at time of exam.      PHYSICAL EXAMINATION:  Patient has no new complaints.  GENERAL: The patient is a well-developed, well-nourished, in no apparent distress.     Vital Signs Last 24 Hrs  T(C): 35.4 (15 Carlin 2023 16:15), Max: 36.8 (14 Jun 2023 20:00)  T(F): 95.7 (15 Carlin 2023 16:15), Max: 98.3 (14 Jun 2023 20:00)  HR: 89 (15 Carlin 2023 16:15) (82 - 98)  BP: 111/57 (15 Carlin 2023 16:15) (109/61 - 118/67)  BP(mean): 85 (14 Jun 2023 20:00) (85 - 85)  RR: 19 (15 Carlin 2023 16:15) (18 - 25)  SpO2: 98% (15 Carlin 2023 16:15) (98% - 100%)    Parameters below as of 15 Carlin 2023 16:15  Patient On (Oxygen Delivery Method): room air       (if applicable)    Chest Tube (if applicable)    HEENT: Head is normocephalic and atraumatic. Extraocular muscles are intact. Mucous membranes are moist.     NECK: Supple, no palpable adenopathy.    LUNGS: Clear to auscultation, no wheezing, rales, or rhonchi.    HEART: Regular rate and rhythm without murmur.    ABDOMEN: Soft, nontender, and nondistended.  No hepatosplenomegaly is noted.    : No painful voiding, no pelvic pain    EXTREMITIES: Without any cyanosis, clubbing, rash, lesions or edema.    NEUROLOGIC: Awake, alert, oriented, grossly intact    SKIN: Warm, dry, good turgor.      LABS:                        8.5    5.05  )-----------( 46       ( 15 Carlin 2023 06:35 )             27.5     06-15    146<H>  |  117<H>  |  48<H>  ----------------------------<  95  3.8   |  17<L>  |  4.88<H>    Ca    9.3      15 Carlin 2023 06:35  Phos  2.8     06-15  Mg     2.1     06-15    TPro  4.3<L>  /  Alb  x   /  TBili  x   /  DBili  x   /  AST  x   /  ALT  x   /  AlkPhos  x   06-14    PT/INR - ( 14 Jun 2023 03:12 )   PT: 20.3 sec;   INR: 1.70 ratio         PTT - ( 14 Jun 2023 03:12 )  PTT:43.7 sec          D-Dimer Assay, Quantitative: 282 ng/mL DDU (06-15-23 @ 06:35)            MICROBIOLOGY: (if applicable)    RADIOLOGY & ADDITIONAL STUDIES:  EKG:   CXR:  ECHO:    IMPRESSION: 63y Male PAST MEDICAL & SURGICAL HISTORY:  Hypothyroid      Hyperlipidemia      Ambulatory dysfunction      Anemia      History of BPH      CKD (chronic kidney disease)      Chronic obstructive pulmonary disease (COPD)      Bipolar disorder      HLD (hyperlipidemia)      BPH (benign prostatic hyperplasia)      Chronic diastolic congestive heart failure      Lymphedema      Chronic leg pain      No significant past surgical history       p/w         IMP: This is a 63 year old man , from [place], AOx3, ambulates with walker , chronic FC, h/o obesity, HTN, HLD, PVD, Seizures, CKD4 (base SCr 3.8), anemia, BPH, Lymphedema, hypothyroidism, HFpEF, possible COPD, decubitus ulcer (sacrum/gluteal), polyneuropathy, polyarthritis, bipolar disorder. Patient presented to ED for abdominal pain, nausea and vomiting brown emesis. Transferred to  ICU for Hypotension possibly from septic shock secondary to complicated UTI and esophagitis, f/w PsA bacteremia, completed 14d cefepime. Noted to have low H/H but no overt sign of bleeding s/p 2 unit PRBCs total (last 6/9). Possible component of hypovolemia. AMS due to toxic/metabolic encephalopathy. Mental status is back to baseline .. AAOx 3 . PLT is down trending      Plan:  - O2 supp as needed   - No sedation  - Mental status back to baseline   - Cryo as per Hematology  and monitor for sign of bleeding   - SHEYLA plateau   - S/P blood product transfusion   - Monitor CBC daily, H/H now stable; if downtrending will need CT chest abd and pelvis   - Dose vanco as per serum level for now  - Wound care for sacral wound  - Viera cath  - GI eval appreciated; "repeat EGD 6/12: 95% prio esophagitis healed, inflammatory nodule in antrum, shallow diverticulum seen towards distal esophagus"  - Restart DVT ppx, H/H stable    case discussed with medicine attending

## 2023-06-15 NOTE — PROGRESS NOTE ADULT - SUBJECTIVE AND OBJECTIVE BOX
Patient is a 63y old  Male who presents with a chief complaint of sepsis (15 Carlin 2023 16:55)    PATIENT IS SEEN AND EXAMINED IN SCU.    ALLERGIES:  No Known Allergies    VITALS:    Vital Signs Last 24 Hrs  T(C): 35.4 (15 Carlin 2023 16:15), Max: 36.8 (14 Jun 2023 20:00)  T(F): 95.7 (15 Carlin 2023 16:15), Max: 98.3 (14 Jun 2023 20:00)  HR: 89 (15 Carlin 2023 16:15) (82 - 98)  BP: 111/57 (15 Carlin 2023 16:15) (109/61 - 118/67)  BP(mean): 85 (14 Jun 2023 20:00) (85 - 85)  RR: 19 (15 Carlin 2023 16:15) (18 - 25)  SpO2: 98% (15 Carlin 2023 16:15) (98% - 100%)    Parameters below as of 15 Carlin 2023 16:15  Patient On (Oxygen Delivery Method): room air        LABS:    CBC Full  -  ( 15 Carlin 2023 06:35 )  WBC Count : 5.05 K/uL  RBC Count : 2.94 M/uL  Hemoglobin : 8.5 g/dL  Hematocrit : 27.5 %  Platelet Count - Automated : 46 K/uL  Mean Cell Volume : 93.5 fl  Mean Cell Hemoglobin : 28.9 pg  Mean Cell Hemoglobin Concentration : 30.9 gm/dL  Auto Neutrophil # : 3.04 K/uL  Auto Lymphocyte # : 1.21 K/uL  Auto Monocyte # : 0.45 K/uL  Auto Eosinophil # : 0.15 K/uL  Auto Basophil # : 0.01 K/uL  Auto Neutrophil % : 60.1 %  Auto Lymphocyte % : 24.0 %  Auto Monocyte % : 8.9 %  Auto Eosinophil % : 3.0 %  Auto Basophil % : 0.2 %    PT/INR - ( 14 Jun 2023 03:12 )   PT: 20.3 sec;   INR: 1.70 ratio         PTT - ( 14 Jun 2023 03:12 )  PTT:43.7 sec  06-15    146<H>  |  117<H>  |  48<H>  ----------------------------<  95  3.8   |  17<L>  |  4.88<H>    Ca    9.3      15 Carlin 2023 06:35  Phos  2.8     06-15  Mg     2.1     06-15    TPro  4.3<L>  /  Alb  x   /  TBili  x   /  DBili  x   /  AST  x   /  ALT  x   /  AlkPhos  x   06-14    CAPILLARY BLOOD GLUCOSE            LIVER FUNCTIONS - ( 14 Jun 2023 03:12 )  Alb: x     / Pro: 4.3 g/dL / ALK PHOS: x     / ALT: x     / AST: x     / GGT: x           Creatinine Trend: 4.88<--, 5.15<--, 3.69<--, 4.82<--, 4.63<--, 4.94<--  I&O's Summary    14 Jun 2023 07:01  -  15 Carlin 2023 07:00  --------------------------------------------------------  IN: 200 mL / OUT: 700 mL / NET: -500 mL            .Sputum Sputum  06-10 @ 10:23   Normal Respiratory Ivelisse present  --    Few polymorphonuclear leukocytes per low power field  Few Squamous epithelial cells per low power field  Rare Yeast like cells per oil power field  Few Gram Variable Rods per oil power field      .Blood Blood-Peripheral  06-10 @ 04:42   No Growth Final  --  --      .Blood Blood-Peripheral  06-10 @ 03:40   No Growth Final  --  --      .Blood Blood  06-01 @ 10:52   No Growth Final  --  --      .Blood Blood  06-01 @ 10:46   No Growth Final  --  --      .Blood Blood-Peripheral  05-28 @ 21:09   No Growth Final  --  Blood Culture PCR  Pseudomonas aeruginosa      Clean Catch Clean Catch (Midstream)  05-28 @ 21:02   >=3 organisms. Probable collection contamination.  --  --          MEDICATIONS:    MEDICATIONS  (STANDING):  albuterol    90 MICROgram(s) HFA Inhaler 2 Puff(s) Inhalation every 6 hours  buPROPion XL (24-Hour) . 150 milliGRAM(s) Oral daily  divalproex  milliGRAM(s) Oral two times a day  dorzolamide 2% Ophthalmic Solution 1 Drop(s) Right EYE <User Schedule>  finasteride 5 milliGRAM(s) Oral daily  metoclopramide Injectable 10 milliGRAM(s) IV Push every 6 hours  nystatin Powder 1 Application(s) Topical two times a day  pantoprazole  Injectable 40 milliGRAM(s) IV Push every 24 hours  sodium chloride 0.45%. 1000 milliLiter(s) (100 mL/Hr) IV Continuous <Continuous>  sucralfate suspension 1 Gram(s) Oral four times a day  tamsulosin 0.4 milliGRAM(s) Oral at bedtime  traZODone 50 milliGRAM(s) Oral at bedtime  vitamin A &amp; D Ointment 1 Application(s) Topical at bedtime      MEDICATIONS  (PRN):  ondansetron Injectable 4 milliGRAM(s) IV Push every 8 hours PRN Nausea and/or Vomiting  sodium chloride 0.9% lock flush 10 milliLiter(s) IV Push every 1 hour PRN Pre/post blood products, medications, blood draw, and to maintain line patency      REVIEW OF SYSTEMS:                           ALL ROS DONE [ X   ]    CONSTITUTIONAL:  LETHARGIC [   ], FEVER [   ], UNRESPONSIVE [   ]  CVS:  CP  [   ], SOB, [   ], PALPITATIONS [   ], DIZZYNESS [   ]  RS: COUGH [   ], SPUTUM [   ]  GI: ABDOMINAL PAIN [   ], NAUSEA [   ], VOMITINGS [   ], DIARRHEA [   ], CONSTIPATION [   ]  :  DYSURIA [   ], NOCTURIA [   ], INCREASED FREQUENCY [   ], DRIBLING [   ],  SKELETAL: PAINFUL JOINTS [   ], SWOLLEN JOINTS [   ], NECK ACHE [   ], LOW BACK ACHE [   ],  SKIN : ULCERS [   ], RASH [   ], ITCHING [   ]  CNS: HEAD ACHE [   ], DOUBLE VISION [   ], BLURRED VISION [   ], AMS / CONFUSION [   ], SEIZURES [   ], WEAKNESS [   ],TINGLING / NUMBNESS [   ]      PHYSICAL EXAMINATION:    GENERAL APPEARANCE: NO DISTRESS  HEENT:  NO PALLOR, NO  JVD,  NO   NODES, NECK SUPPLE  CVS: S1 +, S2 +,   RS: AEEB,  OCCASIONAL  RALES +,   NO RONCHI  ABD: SOFT, NT, NO, BS +  EXT: PE+  SKIN: WARM,   SKELETAL:  ROM REDUCED AT CERVICAL & LS SPINE  CNS:  AAO X 2-3      RADIOLOGY :    RADIOLOGY AND READINGS REVIEWED    ASSESSMENT :       PLAN:  HPI:  64 y/o M, A&Ox3, bedbound, chronic daley catheter with CKD (baseline creat 3.8), COPD, CHFpEF, BPH, PVD, Lymphedema, decubitus ulcers (Sacrum/gluteal) , Anemia, poly-neuropathy, poly-arthritis, hypothyroidism, seizures and Bipolar disorder was BIB EMS from [place] for abdominal pain, [place]. Mr. Rhoades states that he developed acute onset sharp abdominal pain 4 days ago a/w "brown" emesis (witnessed by EMS). Unable to tolerate food/drink x 4 days. Having normal bowel movements, last this morning 5/28. Denies any fevers. NH paperwork states concern for obstruction.  Patient endorses BLOODY EMESIS x 4 days upon further questioning. (29 May 2023 01:36)      # [6/9] - RRT CALLED FOR HYPOTENSION, FEVER AND ANEMIA - PATIENT GIVEN PRBC TRANSFUSION, VANCOMYCIN ADDED, TRANSFERRED FROM SCU TO ICU    # RESOLVING SEPSIS S/T P.AERUGINOSA BACTEREMIA + COMPLICATED UTI    - S/P ON CEFEPIME [AND VANCOMYCIN], BCX [P. AERUGINOSA] AND UCX [ > 3 ORGANISMS], REPEAT BCX - NGTD  - ID CONSULT  - CRITICAL CARE EVALUATION IN PROGRESS    - S/P VASOPRESSORS    # RESOLVING GI BLEED  # ESOPHAGITIS  # ANEMIA OF CHRONIC DISEASE   # THROMBOCYTOPENIA  - MONITOR HGB, TRANSFUSION THRESHOLD HGB < 8  - TREND PLT  - S/P EGD - ULCERATIVE ESOPHAGITIS  - PPI BID  - CARAFATE QID  - ADVANCING DIET PER GI RECOMMENDATION   - GI CONSULT  - HEME/ONC CONSULT    - [6/6] - EPISODE OF NAUSEA/VOMITING - MONITORING CLOSELY, PRN ANTIEMETICS    - [6/10] - GIVEN PRBC TRANSFUSION, PLANNED FOR 1 MORE UNIT OF PRBC, PLT AND FFP, DEPAKOTE STOPPED GIVEN THROMBOCYTOPENIA    - [6/12] - PLANNED FOR POSSIBLE EGD    - 6/12 - REPEAT EGD - IMPROVING ESOPHAGITIS, SHALLOW DIVERTICULUM IN DISTAL ESOPHAGUS, SINGLE INFLAMMATORY NODULE IN ANTRUM    # ANEMIA DUE TO GI BLEED, ANEMIA OF CKD     # CHRONIC HYPOXIC RESPIRATORY FAILURE S/T UNDERLYING COPD  - ON PRN O2  - CRITICAL CARE EVALUATION IN PROGRESS    # THROMBOCYTOPENIA  - W/UP IN PROGRESS  - HEME/ONC CONSULT    # AMS DUE TO ACUTE METABOLIC ENCEPHALOPATHY    # SHEYLA ON CKD STAGE 4  - WORSENING  # CHRONIC DALEY, BPH  - DALEY   - STRICT IS AND OS  - AVOID NEPHROTOXIC AGENTS  - MONITOR CR  - NEPHROLOGY CONSULT    - [6/7] - URINARY RETENTION OVERNIGHT - DALEY PLACED    # AMBULATORY DYSFUNCTION, IMPAIRED GAIT DUE TO GENERALIZED MUSCLE WEAKNESS, CERVICAL & LS SPINAL STENOSIS, POLYARTRHITIS & PERIPHERAL NEUROPATHY. OP  - FALL PRECAUTIONS    # DYSPHAGIA DUE TO METABOLIC ENCEPHALOPATHY - ON MODIFIED DIET PER SWALLOW EVAL     # HX OF SEIZURE DX  - ON VALPROIC ACID [STOPPED GIVEN THROMBOCYTOPENIA]  - DEPAKOTE    # HYPOTHYROIDISM - ON LEVOTHYROXINE    # PRESSURE ULCERS  - PATIENT IS HIGH RISK FOR PRESSURE ULCERS DESPITE PREVENTIVE MEASURES IN PLACE  - WOUND CARE CONSULT    # BIPOLAR DISORDER - ON DEPAKOTE, WELLBUTRIN XL    # GI PPX

## 2023-06-15 NOTE — PROGRESS NOTE ADULT - NS ATTEND AMEND GEN_ALL_CORE FT
This is a 63 year old man , from [place], [facility], ambulates with walker , chronic FC, h/o obesity, HTN, HLD, PVD, Seizures, CKD4 (base SCr 3.8), anemia, BPH, Lymphedema, hypothyroidism, HFpEF, possible COPD, decubitus ulcer (sacrum/gluteal), polyneuropathy, polyarthritis, bipolar disorder. Patient presented to ED for abdominal pain, nausea and vomiting brown emesis. Transferred to  ICU for Hypotension possibly from septic shock secondary to complicated UTI and esophagitis, f/w PsA bacteremia, completed 14d cefepime. Noted to have low H/H but no overt sign of bleeding s/p 2 unit PRBCs total (last 6/9). Possible component of hypovolemia. AMS due to toxic/metabolic encephalopathy, improved.      Plan:  - O2 supp as needed   - AMS due to encephalopathy; mentation improved  - Depakote for seizure disorder  - Hemodynamic monitoring, off pressors  - Albumin iv   - antiemetics and scheduled prokinetics  - tolerating carafate  - SHEYLA   - S/P blood product transfusion   - Monitor CBC daily, H/H now stable  - Recurrent thrombocytopenia: hold anticoagulation and giving cryo  - completed cefepime x 14d  - Dose vanco as per serum level for now  - Wound care for sacral wound  - GI eval appreciated; "repeat EGD 6/12: 95% prio esophagitis healed, inflammatory nodule in antrum, shallow diverticulum seen towards distal esophagus"      Rosemary Davalos MD  Pulmonary & Critical Care  Available on Teams

## 2023-06-15 NOTE — PROGRESS NOTE ADULT - PROBLEM SELECTOR PLAN 6
Likely related to medications cefepime and depakote.  HIT panel negative.   Platelet count continues to drop 46 this morning  Need daily D-dimer and fibrinogen. D-Dimer higher today 282  fibrinogen 93  Daily coags  Heme/onc following. Will give 5U cryo followed by repeat fibrinogen 2 hours after cryo infusion.  Heparin d/c secondary to dropping platelet count.  F/U inflammatory markers

## 2023-06-15 NOTE — PROGRESS NOTE ADULT - PROBLEM SELECTOR PLAN 7
Has received 2U PRBC.  No evidence of active bleeding.  transfuse for Hgb less than 7.9  Heme/onc following

## 2023-06-15 NOTE — PROGRESS NOTE ADULT - ASSESSMENT
SHEYLA: Likely ATN on CKD after shock and bleeding. His Cr has worsened again. Urine Output is > 50 mL/hour.  - Hold HD for now.  - Follow up BMP.  - Maintain daley for now.        Metabolic Acidosis:  Non AG, likely Distal RTA.  Associated with relatively low K.  - Monitor BMP.    Hypernatremia:  Possible due to Volume depletion.  - Start Hypotonic fluids, preferable 1/2 NS at 50 mL/hour until Na < 144.    CKD  4  -Renal diet.  - Avoid Nephrotoxins.  - follow up BMP.    Anemia of CKD:  - Transfuse as needed for Hg < 7.    Hypokalemia:  Likely Distal RTA. Mg is wnl. Improved today.  - Monitor K and Mg.  - Supplement as needed.

## 2023-06-15 NOTE — PROGRESS NOTE ADULT - SUBJECTIVE AND OBJECTIVE BOX
63y Male    Meds:    Allergies    No Known Allergies    Intolerances        VITALS:  Vital Signs Last 24 Hrs  T(C): 35.4 (15 Carlin 2023 16:15), Max: 36.8 (14 Jun 2023 20:00)  T(F): 95.7 (15 Carlin 2023 16:15), Max: 98.3 (14 Jun 2023 20:00)  HR: 89 (15 Carlin 2023 16:15) (82 - 98)  BP: 111/57 (15 Carlin 2023 16:15) (109/61 - 118/67)  BP(mean): 85 (14 Jun 2023 20:00) (85 - 85)  RR: 19 (15 Carlin 2023 16:15) (18 - 25)  SpO2: 98% (15 Carlin 2023 16:15) (98% - 100%)    Parameters below as of 15 Carlin 2023 16:15  Patient On (Oxygen Delivery Method): room air        LABS/DIAGNOSTIC TESTS:                          8.5    5.05  )-----------( 46       ( 15 Carlin 2023 06:35 )             27.5         06-15    146<H>  |  117<H>  |  48<H>  ----------------------------<  95  3.8   |  17<L>  |  4.88<H>    Ca    9.3      15 Carlin 2023 06:35  Phos  2.8     06-15  Mg     2.1     06-15    TPro  4.3<L>  /  Alb  x   /  TBili  x   /  DBili  x   /  AST  x   /  ALT  x   /  AlkPhos  x   06-14      LIVER FUNCTIONS - ( 14 Jun 2023 03:12 )  Alb: x     / Pro: 4.3 g/dL / ALK PHOS: x     / ALT: x     / AST: x     / GGT: x             CULTURES: .Sputum Sputum  06-10 @ 10:23   Normal Respiratory Ivelisse present  --    Few polymorphonuclear leukocytes per low power field  Few Squamous epithelial cells per low power field  Rare Yeast like cells per oil power field  Few Gram Variable Rods per oil power field      .Blood Blood-Peripheral  06-10 @ 04:42   No Growth Final  --  --      .Blood Blood-Peripheral  06-10 @ 03:40   No Growth Final  --  --      .Blood Blood  06-01 @ 10:52   No Growth Final  --  --      .Blood Blood  06-01 @ 10:46   No Growth Final  --  --      .Blood Blood-Peripheral  05-28 @ 21:09   No Growth Final  --  Blood Culture PCR  Pseudomonas aeruginosa      Clean Catch Clean Catch (Midstream)  05-28 @ 21:02   >=3 organisms. Probable collection contamination.  --  --            RADIOLOGY:      ROS:  [  ] UNABLE TO ELICIT 63y Male who is doing well, he denies having any complaints , no SOB, no coughing , no nausea, vomiting , diarrhea or abdominal pain. He has no fevers or chills. He completed his antibiotic treatment for his Pseudomonas Bacteremia a few days ago.    Meds:    Allergies    No Known Allergies    Intolerances        VITALS:  Vital Signs Last 24 Hrs  T(C): 35.4 (15 Carlin 2023 16:15), Max: 36.8 (14 Jun 2023 20:00)  T(F): 95.7 (15 Carlin 2023 16:15), Max: 98.3 (14 Jun 2023 20:00)  HR: 89 (15 Carlin 2023 16:15) (82 - 98)  BP: 111/57 (15 Carlin 2023 16:15) (109/61 - 118/67)  BP(mean): 85 (14 Jun 2023 20:00) (85 - 85)  RR: 19 (15 Carlin 2023 16:15) (18 - 25)  SpO2: 98% (15 Carlin 2023 16:15) (98% - 100%)    Parameters below as of 15 Carlin 2023 16:15  Patient On (Oxygen Delivery Method): room air        LABS/DIAGNOSTIC TESTS:                          8.5    5.05  )-----------( 46       ( 15 Carlin 2023 06:35 )             27.5         06-15    146<H>  |  117<H>  |  48<H>  ----------------------------<  95  3.8   |  17<L>  |  4.88<H>    Ca    9.3      15 Carlin 2023 06:35  Phos  2.8     06-15  Mg     2.1     06-15    TPro  4.3<L>  /  Alb  x   /  TBili  x   /  DBili  x   /  AST  x   /  ALT  x   /  AlkPhos  x   06-14      LIVER FUNCTIONS - ( 14 Jun 2023 03:12 )  Alb: x     / Pro: 4.3 g/dL / ALK PHOS: x     / ALT: x     / AST: x     / GGT: x             CULTURES: .Sputum Sputum  06-10 @ 10:23   Normal Respiratory Ivelisse present  --    Few polymorphonuclear leukocytes per low power field  Few Squamous epithelial cells per low power field  Rare Yeast like cells per oil power field  Few Gram Variable Rods per oil power field      .Blood Blood-Peripheral  06-10 @ 04:42   No Growth Final  --  --      .Blood Blood-Peripheral  06-10 @ 03:40   No Growth Final  --  --      .Blood Blood  06-01 @ 10:52   No Growth Final  --  --      .Blood Blood  06-01 @ 10:46   No Growth Final  --  --      .Blood Blood-Peripheral  05-28 @ 21:09   No Growth Final  --  Blood Culture PCR  Pseudomonas aeruginosa      Clean Catch Clean Catch (Midstream)  05-28 @ 21:02   >=3 organisms. Probable collection contamination.  --  --            RADIOLOGY:      ROS:  [  ] UNABLE TO ELICIT

## 2023-06-15 NOTE — PROGRESS NOTE ADULT - ASSESSMENT
63M, from [place], AOx3, bedbound, chronic FC, h/o obesity, HTN, HLD, PVD, Seizures, CKD (base SCr 3.8), anemia, BPH, Lymphedema, hypothyroidism, HFpEF, COPD, decubitus ulcer (sacrum/gluteal), polyneuropathy, polyarthritis, bipolar disorder, p/w abd pain a/w "brown” emesis and food intolerance x4d. Admitted for complicated UTI + suspected GIB.  Patient initially had BP of 88/62 but it improved with IVF. Patient admitted to poor oral intake for the past 4 days due to nausea and vomiting.  CT head was negative for acute changes. CT abd showed circumferential wall thickening of distal esophagus. PPI was started.  Patient complains of persistent nausea without vomiting. Reglan was started TID to improve GI motility. Clear liquid diet was started. Hemoglobin was stable around his baseline ~9. Pt had thrombocytopenia with concern for cefepime induced thrombobytopenia.  We are following daily d-dimers and dribrinogen levels with plan to transfuse 10units cryo if PLTs < 100 or if PLTs < 150 with bleeding. PLTs have been stable in 70-50's and Heparin subQ was started for DVT prophylaxis.    Patient had chronic daley from Dec 2022 and he failed TOV. UA was positive. Patient was started on Cefepime. Blood cultures and urine cultures were sent.  Wound care was consulted for sacral pressure ulcer.    Patient presented with BUN/Cr 103/4.98 (Baseline Cr 3.6~3.8). Patient received IVF and SCr has been slowly improving. Dr. Valenzuela nephrologist is on board.    Patient presented with Hypokelamia with K 3.1->2.8, likely due to vomiting and decreased PO intake. It was repleted IV and oral.

## 2023-06-15 NOTE — PROGRESS NOTE ADULT - NS ATTEND AMEND GEN_ALL_CORE FT
# THROMBOCYTOPENIA  most likely multifactorial  sepsis/polypharmacy = including abx and Depakote   noted  further decline  in plat count   HIT w/u was negative in the past  repeated DIC panel positive   SDP tx if platelet < 20 K( sepsis)  or bleeding  keep plat > 50 K for  invasive procedure   noted low  fibrinogen,  recommend  Cryo tx if  fibrinogen < 100-150  F/u CBC  avoid nonessential meds      #  NCNC ANEMIA  # SHEYLA on CKD  w/u c/w with anemia of chronic disease  f/u CBC, PRBC TX prn for HB<7   if persistent  CKD,  and EPO < 200  pt may benefit from SUREKHA inj  for h/h support   in the future   call with questions [phone]

## 2023-06-15 NOTE — PROGRESS NOTE ADULT - PROBLEM SELECTOR PLAN 1
Secondary to pseudomonas bacteremia and UTI  Blood culture from 6/11 Negative.  Antibiotics completed.  Continue to monitor off antibiotics.

## 2023-06-16 NOTE — PROGRESS NOTE ADULT - SUBJECTIVE AND OBJECTIVE BOX
Time of Visit:  Patient seen and examined.     MEDICATIONS  (STANDING):  albuterol    90 MICROgram(s) HFA Inhaler 2 Puff(s) Inhalation every 6 hours  buPROPion XL (24-Hour) . 150 milliGRAM(s) Oral daily  divalproex  milliGRAM(s) Oral two times a day  dorzolamide 2% Ophthalmic Solution 1 Drop(s) Right EYE <User Schedule>  finasteride 5 milliGRAM(s) Oral daily  nystatin Powder 1 Application(s) Topical two times a day  pantoprazole  Injectable 40 milliGRAM(s) IV Push every 24 hours  sodium chloride 0.45%. 1000 milliLiter(s) (100 mL/Hr) IV Continuous <Continuous>  sucralfate suspension 1 Gram(s) Oral four times a day  tamsulosin 0.4 milliGRAM(s) Oral at bedtime  traZODone 50 milliGRAM(s) Oral at bedtime  vitamin A &amp; D Ointment 1 Application(s) Topical at bedtime      MEDICATIONS  (PRN):  ondansetron Injectable 4 milliGRAM(s) IV Push every 8 hours PRN Nausea and/or Vomiting  sodium chloride 0.9% lock flush 10 milliLiter(s) IV Push every 1 hour PRN Pre/post blood products, medications, blood draw, and to maintain line patency       Medications up to date at time of exam.    ROS; No fever, chills, nasal congestion. Non productive cough .    PHYSICAL EXAMINATION:    Vital Signs Last 24 Hrs  T(C): 36.8 (16 Jun 2023 05:05), Max: 36.8 (16 Jun 2023 05:05)  T(F): 98.3 (16 Jun 2023 05:05), Max: 98.3 (16 Jun 2023 05:05)  HR: 102 (16 Jun 2023 05:05) (89 - 102)  BP: 108/62 (16 Jun 2023 05:05) (108/62 - 121/60)  BP(mean): --  RR: 18 (16 Jun 2023 05:05) (18 - 22)  SpO2: 100% (16 Jun 2023 05:05) (97% - 100%)    Parameters below as of 16 Jun 2023 05:05  Patient On (Oxygen Delivery Method): room air       (if applicable)    General : Alert and oriented. Morbid obese. No acute distress .     HEENT: Head is normocephalic and atraumatic. No nasal tenderness . Extraocular muscles are intact. Mucous membranes are moist.     NECK: Supple, no palpable adenopathy.    LUNGS: Non labored. Has scatterred rhonchi. No use of accessory muscle.     HEART: S1 S2 Regular rate and no click/ rub.     ABDOMEN: Soft, nontender, and nondistended.  Active bowel sounds.     EXTREMITIES: Has chronic Bilateral LE Venous Stasis .     NEUROLOGIC: Awake, alert, oriented.     SKIN: Warm and moist . Non diaphoretic. Has chronic Bilateral LE Venous Stasis .         LABS:                        7.8    5.30  )-----------( 58       ( 16 Jun 2023 08:35 )             25.1     06-16    149<H>  |  120<H>  |  46<H>  ----------------------------<  84  3.7   |  18<L>  |  5.02<H>    Ca    9.4      16 Jun 2023 08:35  Phos  3.3     06-16  Mg     2.0     06-16    TPro  4.6<L>  /  Alb  1.6<L>  /  TBili  0.4  /  DBili  0.1  /  AST  5<L>  /  ALT  8<L>  /  AlkPhos  93  06-16    PT/INR - ( 16 Jun 2023 08:35 )   PT: 18.6 sec;   INR: 1.56 ratio         PTT - ( 16 Jun 2023 08:35 )  PTT:41.5 sec          D-Dimer Assay, Quantitative: <150 ng/mL DDU (06-16-23 @ 08:35)            MICROBIOLOGY: (if applicable)    RADIOLOGY & ADDITIONAL STUDIES:  EKG:   CXR:  ECHO:    IMPRESSION: 63y Male PAST MEDICAL & SURGICAL HISTORY:  Hypothyroid      Hyperlipidemia      Ambulatory dysfunction      Anemia      History of BPH      CKD (chronic kidney disease)      Chronic obstructive pulmonary disease (COPD)      Bipolar disorder      HLD (hyperlipidemia)      BPH (benign prostatic hyperplasia)      Chronic diastolic congestive heart failure      Lymphedema      Chronic leg pain      No significant past surgical history    Impression: This is a 62 Y/O Male from [place] presented to ED for abdominal pain, nausea and vomiting brown emesis. Transferred to  ICU for Hypotension possibly from septic shock secondary to complicated UTI and esophagitis, f/w PsA bacteremia, completed 14d cefepime. 06-09-23 Had RRT due to Hypotension , Fever, Anemia , had s/p PRBC transfusion  . Now resolving Sepsis S/T  P Aeruginosa Bacteremia and complicated UTI.  Had a AMS due to toxic/metabolic encephalopathy. Mental status is back to baseline AXOX3. On PRN Oxygen supplementation due to Chronic Hypoxic Respiratory Failure secondary to COPD.        Suggestion:  O2 saturation 98% room air. Monitor O2 saturation room air trend. Can have Oxygen supplementation 2L NC if needed.   Continue Albuterol 90 mcg 2 puffs Q 6 Hours.   No sedation.  Cryo as per Hematology  and monitor for sign of bleeding    Monitor CBC daily, H/H now stable; if downtrending will need CT chest abd and pelvis .   Wound care for sacral wound.

## 2023-06-16 NOTE — PROGRESS NOTE ADULT - SUBJECTIVE AND OBJECTIVE BOX
Patient is a 63y old  Male who presents with a chief complaint of sepsis       SUBJECTIVE / OVERNIGHT EVENTS: events noted. s/p cryo tx  yesterday, no bleeding      MEDICATIONS  (STANDING):  albuterol    90 MICROgram(s) HFA Inhaler 2 Puff(s) Inhalation every 6 hours  buPROPion XL (24-Hour) . 150 milliGRAM(s) Oral daily  divalproex  milliGRAM(s) Oral two times a day  dorzolamide 2% Ophthalmic Solution 1 Drop(s) Right EYE <User Schedule>  finasteride 5 milliGRAM(s) Oral daily  heparin   Injectable 5000 Unit(s) SubCutaneous every 8 hours  metoclopramide Injectable 10 milliGRAM(s) IV Push every 6 hours  nystatin Powder 1 Application(s) Topical two times a day  pantoprazole  Injectable 40 milliGRAM(s) IV Push every 24 hours  sodium chloride 0.45%. 1000 milliLiter(s) (100 mL/Hr) IV Continuous <Continuous>  sucralfate suspension 1 Gram(s) Oral four times a day  tamsulosin 0.4 milliGRAM(s) Oral at bedtime  traZODone 50 milliGRAM(s) Oral at bedtime  vitamin A &amp; D Ointment 1 Application(s) Topical at bedtime    MEDICATIONS  (PRN):  ondansetron Injectable 4 milliGRAM(s) IV Push every 8 hours PRN Nausea and/or Vomiting  sodium chloride 0.9% lock flush 10 milliLiter(s) IV Push every 1 hour PRN Pre/post blood products, medications, blood draw, and to maintain line patency    CAPILLARY BLOOD GLUCOSE      POCT Blood Glucose.: 101 mg/dL (14 Jun 2023 11:14)    I&O's Summary    14 Jun 2023 07:01  -  15 Carlin 2023 07:00  --------------------------------------------------------  IN: 200 mL / OUT: 700 mL / NET: -500 mL        PHYSICAL EXAM:      Vital Signs Last 24 Hrs  T(C): 36.9 (16 Jun 2023 11:43), Max: 36.9 (16 Jun 2023 11:43)  T(F): 98.4 (16 Jun 2023 11:43), Max: 98.4 (16 Jun 2023 11:43)  HR: 99 (16 Jun 2023 11:43) (98 - 102)  BP: 131/94 (16 Jun 2023 11:43) (108/62 - 131/94)  BP(mean): --  RR: 20 (16 Jun 2023 11:43) (18 - 20)  SpO2: 100% (16 Jun 2023 11:43) (97% - 100%)    Parameters below as of 16 Jun 2023 11:43  Patient On (Oxygen Delivery Method): room air          GEN: NAD; A and O x 3,appears wak/pale   LUNGS: scattered rhonchi  HEART: S1 S2  ABDOMEN: soft, non-tender, non-distended, + BS  EXTREMITIES: B/LE edema, hyperpigmentation          LABS:                                     7.8    5.30  )-----------( 58       ( 16 Jun 2023 08:35 )             25.1     06-16    149<H>  |  120<H>  |  46<H>  ----------------------------<  84  3.7   |  18<L>  |  5.02<H>    Ca    9.4      16 Jun 2023 08:35  Phos  3.3     06-16  Mg     2.0     06-16    TPro  4.6<L>  /  Alb  1.6<L>  /  TBili  0.4  /  DBili  0.1  /  AST  5<L>  /  ALT  8<L>  /  AlkPhos  93  06-16  PT/INR - ( 16 Jun 2023 08:35 )   PT: 18.6 sec;   INR: 1.56 ratio         PTT - ( 16 Jun 2023 08:35 )  PTT:41.5 sec        Fibrinogen Clauss: 204:  D-Dimer Assay, Quantitative: <150    SARS-CoV-2: NotDetec (10 Carlin 2023 10:35)  COVID-19 PCR: NotDetec (07 Jun 2023 06:43)  COVID-19 PCR: NotDetec (04 Jun 2023 18:40)  SARS-CoV-2: NotDetec (28 May 2023 21:02)

## 2023-06-16 NOTE — PROGRESS NOTE ADULT - PROBLEM SELECTOR PLAN 7
Likely related to medications cefepime and Depakote.  HIT panel negative.   Repeat DIC panel +   Platelet count slightly improving.   Need daily D-dimer and fibrinogen. Numbers improving.   Daily coags  Heme/onc following. S/p 5U cryo.   Heparin d/c secondary to dropping platelet count.  F/U inflammatory markers  Keep Plt > 50 for invasive procedure. SDP tx of plt < 20 or bleeding.   Hem/Onc following and reccs appreciated.

## 2023-06-16 NOTE — PROGRESS NOTE ADULT - PROBLEM SELECTOR PLAN 2
+HX  S/P EGD X2  5/30 and 6/12. No active bleeding found  Transfused 2U PRBCs on 6/10  No s/s of bleeding   Slight drop in H&H.   Transfuse if Hgb < 7.0

## 2023-06-16 NOTE — PROGRESS NOTE ADULT - SUBJECTIVE AND OBJECTIVE BOX
GAYLA PEARCE    SCU progress note    INTERVAL HPI/OVERNIGHT EVENTS:  no acute events overnight.     FULL CODE.     Covid - 19 PCR: 6/10 non detected    The 4Ms    What Matters Most: see GOC  Age appropriate Medications/Screen for High Risk Medication: Yes  Mentation: see CAM below  Mobility: defer to physical exam    The Confusion Assessment Method (CAM) Diagnostic Algorithm     1: Acute Onset or Fluctuating Course  - Is there evidence of an acute change in mental status from the patient’s baseline? Did the (abnormal) behavior  fluctuate during the day, that is, tend to come and go, or increase and decrease in severity?  [ ] YES [x ] NO     2: Inattention  - Did the patient have difficulty focusing attention, being easily distractible, or having difficulty keeping track of what was being said?  [ ] YES [x ] NO     3: Disorganized thinking  -Was the patient’s thinking disorganized or incoherent, such as rambling or irrelevant conversation, unclear or illogical flow of ideas, or unpredictable switching from subject to subject?  [ ] YES [x ] NO    4: Altered Level of consciousness?  [ ] YES [x ] NO    The diagnosis of delirium by CAM requires the presence of features 1 and 2 and either 3 or 4.    PRESSORS: [ ] YES [x ] NO    Cardiovascular:  Heart Failure  Acute   Acute on Chronic  Chronic         Pulmonary:  albuterol    90 MICROgram(s) HFA Inhaler 2 Puff(s) Inhalation every 6 hours    Hematalogic:    Other:  albumin human 25% IVPB 50 milliLiter(s) IV Intermittent every 8 hours  buPROPion XL (24-Hour) . 150 milliGRAM(s) Oral daily  divalproex  milliGRAM(s) Oral two times a day  dorzolamide 2% Ophthalmic Solution 1 Drop(s) Right EYE <User Schedule>  finasteride 5 milliGRAM(s) Oral daily  nystatin Powder 1 Application(s) Topical two times a day  ondansetron Injectable 4 milliGRAM(s) IV Push every 8 hours PRN  pantoprazole  Injectable 40 milliGRAM(s) IV Push every 24 hours  sodium chloride 0.45%. 1000 milliLiter(s) IV Continuous <Continuous>  sodium chloride 0.9% lock flush 10 milliLiter(s) IV Push every 1 hour PRN  sucralfate suspension 1 Gram(s) Oral four times a day  tamsulosin 0.4 milliGRAM(s) Oral at bedtime  traZODone 50 milliGRAM(s) Oral at bedtime  vitamin A &amp; D Ointment 1 Application(s) Topical at bedtime    albumin human 25% IVPB 50 milliLiter(s) IV Intermittent every 8 hours  albuterol    90 MICROgram(s) HFA Inhaler 2 Puff(s) Inhalation every 6 hours  buPROPion XL (24-Hour) . 150 milliGRAM(s) Oral daily  divalproex  milliGRAM(s) Oral two times a day  dorzolamide 2% Ophthalmic Solution 1 Drop(s) Right EYE <User Schedule>  finasteride 5 milliGRAM(s) Oral daily  nystatin Powder 1 Application(s) Topical two times a day  ondansetron Injectable 4 milliGRAM(s) IV Push every 8 hours PRN  pantoprazole  Injectable 40 milliGRAM(s) IV Push every 24 hours  sodium chloride 0.45%. 1000 milliLiter(s) IV Continuous <Continuous>  sodium chloride 0.9% lock flush 10 milliLiter(s) IV Push every 1 hour PRN  sucralfate suspension 1 Gram(s) Oral four times a day  tamsulosin 0.4 milliGRAM(s) Oral at bedtime  traZODone 50 milliGRAM(s) Oral at bedtime  vitamin A &amp; D Ointment 1 Application(s) Topical at bedtime    Drug Dosing Weight  Height (cm): 172.7 (09 Jun 2023 21:00)  Weight (kg): 84.2 (09 Jun 2023 21:00)  BMI (kg/m2): 28.2 (09 Jun 2023 21:00)  BSA (m2): 1.98 (09 Jun 2023 21:00)    CENTRAL LINE: [ ] YES [x ] NO  LOCATION:   DATE INSERTED:  REMOVE: [ ] YES [ ] NO  EXPLAIN:    HATFIELD: [x ] YES [ ] NO    DATE INSERTED:  REMOVE:  [ ] YES [ ] NO  EXPLAIN:    PAST MEDICAL & SURGICAL HISTORY:  Hypothyroid      Hyperlipidemia      Ambulatory dysfunction      Anemia      History of BPH      CKD (chronic kidney disease)      Chronic obstructive pulmonary disease (COPD)      Bipolar disorder      HLD (hyperlipidemia)      BPH (benign prostatic hyperplasia)      Chronic diastolic congestive heart failure      Lymphedema      Chronic leg pain      No significant past surgical history      06-15 @ 07:01  -  06-16 @ 07:00  --------------------------------------------------------  IN: 0 mL / OUT: 600 mL / NET: -600 mL      PHYSICAL EXAM:  GENERAL: NAD, Anasarca.   HEAD:  Atraumatic, Normocephalic  EYES: PERRLA, conjunctiva and sclera clear.   ENMT: Dry mucous membranes.   NECK: Supple.   NERVOUS SYSTEM:  Alert & Oriented X3. Moves all extremities spontaneously. Generalized weakness.    CHEST/LUNG: Diminished at the bases bilaterally;  No rales, rhonchi, wheezing, or rubs  HEART: Regular rate and rhythm; No murmurs, rubs, or gallops  ABDOMEN: Soft, Nontender, Nondistended; Bowel sounds present  EXTREMITIES:  2+ Peripheral Pulses, No clubbing, cyanosis Upper and lower exts edema. +2 pitting edema of lower ext. RUE with extended dwell with redness and swelling above IV site.   SKIN: Sacral wound. Pale skin.       LABS:  CBC Full  -  ( 16 Jun 2023 08:35 )  WBC Count : 5.30 K/uL  RBC Count : 2.64 M/uL  Hemoglobin : 7.8 g/dL  Hematocrit : 25.1 %  Platelet Count - Automated : 58 K/uL  Mean Cell Volume : 95.1 fl  Mean Cell Hemoglobin : 29.5 pg  Mean Cell Hemoglobin Concentration : 31.1 gm/dL  Auto Neutrophil # : 3.53 K/uL  Auto Lymphocyte # : 0.84 K/uL  Auto Monocyte # : 0.52 K/uL  Auto Eosinophil # : 0.14 K/uL  Auto Basophil # : 0.00 K/uL  Auto Neutrophil % : 66.7 %  Auto Lymphocyte % : 15.8 %  Auto Monocyte % : 9.8 %  Auto Eosinophil % : 2.6 %  Auto Basophil % : 0.0 %    06-16    149<H>  |  120<H>  |  46<H>  ----------------------------<  84  3.7   |  18<L>  |  5.02<H>    Ca    9.4      16 Jun 2023 08:35  Phos  3.3     06-16  Mg     2.0     06-16    TPro  4.6<L>  /  Alb  1.6<L>  /  TBili  0.4  /  DBili  0.1  /  AST  5<L>  /  ALT  8<L>  /  AlkPhos  93  06-16    PT/INR - ( 16 Jun 2023 08:35 )   PT: 18.6 sec;   INR: 1.56 ratio         PTT - ( 16 Jun 2023 08:35 )  PTT:41.5 sec    [ x ]  DVT Prophylaxis SCDs  [  ]  Nutrition: Consistent Carb Diet and Mildly thick liquids.     RADIOLOGY & ADDITIONAL STUDIES:   < from: Xray Chest 1 View- PORTABLE-Routine (Xray Chest 1 View- PORTABLE-Routine in AM.) (06.14.23 @ 09:37) >    ACC: [id number] EXAM:  XR CHEST PORTABLE ROUTINE 1V   ORDERED BY: AMBROSE KUMAR     ACC: [id number] EXAM:  XR CHEST PORTABLE ROUTINE 1V   ORDERED BY: EDMUND BOLTON     PROCEDURE DATE:  06/13/2023          INTERPRETATION:  TECHNIQUE: A series of portable chest x-rays was   obtained.    COMPARISON: 6/11/2023    CLINICAL INFORMATION: Shortness of breath. Sepsis.    FINDINGS:    6/13/2023 8:27 AM:  The heart is normal in size.  There are patchy bibasilar opacities.  There are no pleural effusions.  There is no pneumothorax.    6/14/2023 8:51 AM:  The left IJ catheter was removed. There is improved aeration at the left   lung base.  IMPRESSION:    Patchy right basilar opacities, possibly atelectasis or pneumonia. The   left lung base has improved.    --- End of Report ---    LU DELEON MD; Attending Radiologist  This document has been electronically signed. Carlin 15 2023 11:24AM    < end of copied text >      Discussed plan of care with intensivist and consultants.

## 2023-06-16 NOTE — PROGRESS NOTE ADULT - ASSESSMENT
SHEYLA: Likely ATN on CKD after shock and bleeding. His Cr has worsened again. Urine Output is > 50 mL/hour.  He may need HD if his azotemia is worsening. He may be intravascular volume depleted.  - Albumin 25% IVP q 8 hours for 2 day.  - Gentle hydration.  - Repeat Urine Na, Cr and Osmolality.  - Hold HD for now.  - Follow up BMP.  - Maintain daley for now.        Metabolic Acidosis:  Non AG, likely Distal RTA.  Associated with relatively low K.  - Monitor BMP.    Hypernatremia:  Possible due to Volume depletion.  - Start Hypotonic fluids, preferable 1/2 NS at 50 mL/hour until Na < 144.    CKD  4  -Renal diet.  - Avoid Nephrotoxins.  - follow up BMP.    Anemia of CKD:  - Transfuse as needed for Hg < 7.    Hypokalemia:  Likely Distal RTA. Mg is wnl. Improved today.  - Monitor K and Mg.  - Supplement as needed.

## 2023-06-16 NOTE — PROGRESS NOTE ADULT - NS ATTEND AMEND GEN_ALL_CORE FT
This is a 63 year old man , from [place], [facility], ambulates with walker , chronic FC, h/o obesity, HTN, HLD, PVD, Seizures, CKD4 (base SCr 3.8), anemia, BPH, Lymphedema, hypothyroidism, HFpEF, possible COPD, decubitus ulcer (sacrum/gluteal), polyneuropathy, polyarthritis, bipolar disorder. Patient presented to ED for abdominal pain, nausea and vomiting brown emesis. Transferred to  ICU for Hypotension possibly from septic shock secondary to complicated UTI and esophagitis, f/w PsA bacteremia, completed 14d cefepime. Noted to have low H/H but no overt sign of bleeding s/p 2 unit PRBCs total (last 6/9). Possible component of hypovolemia. AMS due to toxic/metabolic encephalopathy, improved.      Plan:  - O2 supp as needed   - AMS due to encephalopathy; mentation improved  - Depakote for seizure disorder  - Hemodynamic monitoring, off pressors  - Albumin iv   - antiemetics and scheduled prokinetics  - tolerating carafate  - SHEYLA worsening but lytes acceptable and making urine; per nephrology if continuing to worsen will likely require HD in near future. To discuss with NOK/HCP  - GI eval appreciated; "repeat EGD 6/12: 95% prio esophagitis healed, inflammatory nodule in antrum, shallow diverticulum seen towards distal esophagus"  - S/P blood product transfusion   - Monitor CBC daily, H/H slightly downtreneded this AM. Will repeat in PM, no active s/s bleed and remains hemodynamically stable   - Recurrent thrombocytopenia slowly improving hold anticoagulation and giving cryo  - completed cefepime x 14d, s/p course of vancomycin  - Wound care for sacral wound        Rosemary Davalos MD  Pulmonary & Critical Care  Available on Teams

## 2023-06-16 NOTE — PROGRESS NOTE ADULT - PROBLEM SELECTOR PLAN 4
Extended dwell to RUE not in use.   Arm swollen and redness around IV site.   Line to be removed and pressure dressing to be applied and monitored.   Concern for DVT vs thrombophlebitis  Will obtain Venous doppler US to RUE

## 2023-06-16 NOTE — PROGRESS NOTE ADULT - PROBLEM SELECTOR PLAN 11
CKD stage 4  Daily CMP and phos level  Dr Olivier following  Continue to trend creatinine  Strict I&Os  Encourage water intake.    # SHEYLA on CKD   Scr 5.02 worsening. No plan for HD at this time.   Albumin and gentle hydration.   Through the weekend if HD needed, Intensivist will insert catheter.   Discussed with Nephro

## 2023-06-16 NOTE — PROGRESS NOTE ADULT - SUBJECTIVE AND OBJECTIVE BOX
GAYLA PEARCE  63y  Patient is a 63y old  Male who presents with a chief complaint of sepsis (16 Jun 2023 12:14)    HPI:  Seen and examined. Followed for SHEYLA on CKD. Cr is up again today. No new symptoms.    HEALTH ISSUES - PROBLEM Dx:  GIB (gastrointestinal bleeding)    Sepsis    Anemia    Hypothyroid    CKD (chronic kidney disease)    BPH (benign prostatic hyperplasia)    Bipolar disorder    Advance care planning    Seizure    Esophagitis    Hypokalemia    Ambulatory dysfunction    Prophylactic measure    Electrolyte depletion    Acute encephalopathy    Acute kidney injury superimposed on CKD    Ulcerative esophagitis    Decubitus ulcers    Functional quadriplegia    Moderate protein-calorie malnutrition    Encounter for palliative care    Chronic heart failure with preserved ejection fraction (HFpEF)    Sepsis with acute hypoxic respiratory failure    Protein calorie malnutrition    Palliative care encounter    SHEYLA (acute kidney injury)    Palliative care encounter    Debility    Severe protein-calorie malnutrition    Encephalopathy, unspecified    Thrombocytopenia          MEDICATIONS  (STANDING):  albuterol    90 MICROgram(s) HFA Inhaler 2 Puff(s) Inhalation every 6 hours  buPROPion XL (24-Hour) . 150 milliGRAM(s) Oral daily  divalproex  milliGRAM(s) Oral two times a day  dorzolamide 2% Ophthalmic Solution 1 Drop(s) Right EYE <User Schedule>  finasteride 5 milliGRAM(s) Oral daily  nystatin Powder 1 Application(s) Topical two times a day  pantoprazole  Injectable 40 milliGRAM(s) IV Push every 24 hours  sodium chloride 0.45%. 1000 milliLiter(s) (100 mL/Hr) IV Continuous <Continuous>  sucralfate suspension 1 Gram(s) Oral four times a day  tamsulosin 0.4 milliGRAM(s) Oral at bedtime  traZODone 50 milliGRAM(s) Oral at bedtime  vitamin A &amp; D Ointment 1 Application(s) Topical at bedtime    MEDICATIONS  (PRN):  ondansetron Injectable 4 milliGRAM(s) IV Push every 8 hours PRN Nausea and/or Vomiting  sodium chloride 0.9% lock flush 10 milliLiter(s) IV Push every 1 hour PRN Pre/post blood products, medications, blood draw, and to maintain line patency    Vital Signs Last 24 Hrs  T(C): 36.9 (16 Jun 2023 11:43), Max: 36.9 (16 Jun 2023 11:43)  T(F): 98.4 (16 Jun 2023 11:43), Max: 98.4 (16 Jun 2023 11:43)  HR: 99 (16 Jun 2023 11:43) (89 - 102)  BP: 131/94 (16 Jun 2023 11:43) (108/62 - 131/94)  BP(mean): --  RR: 20 (16 Jun 2023 11:43) (18 - 20)  SpO2: 100% (16 Jun 2023 11:43) (97% - 100%)    Parameters below as of 16 Jun 2023 11:43  Patient On (Oxygen Delivery Method): room air      Daily     Daily     PHYSICAL EXAM:  Constitutional:  He appears comfortable and not distressed. Not diaphoretic.    Neck:  The thyroid is normal. Trachea is midline.     Respiratory: The lungs are clear to auscultation. No dullness and expansion is normal.    Cardiovascular: S1 and S2 are normal. No mummurs, rubs or gallops are present.    Gastrointestinal: The abdomen is soft. No tenderness is present.     Genitourinary: The bladder is not distended. No CVA tenderness is present.    Extremities: No edema is noted but has anasarca. No deformities are present.    Neurological: Cognition is normal. Tone, power and sensation are normal.     Skin: No lesions are seen  or palpated.    Lymph Nodes: No lymphadenopathy is present.    Psychiatric: Mood is appropriate. No hallucinations or flight of ideas are noted.                              7.8    5.30  )-----------( 58       ( 16 Jun 2023 08:35 )             25.1     06-16    149<H>  |  120<H>  |  46<H>  ----------------------------<  84  3.7   |  18<L>  |  5.02<H>    Ca    9.4      16 Jun 2023 08:35  Phos  3.3     06-16  Mg     2.0     06-16    TPro  4.6<L>  /  Alb  1.6<L>  /  TBili  0.4  /  DBili  0.1  /  AST  5<L>  /  ALT  8<L>  /  AlkPhos  93  06-16

## 2023-06-16 NOTE — PROGRESS NOTE ADULT - PROBLEM SELECTOR PLAN 3
S/P EGD  5/30 and 6/13  +Esophagitis  Continue reglan 10mg every 6 hours  Zofran 4mg prn  Sucralfate 1Gm 4 times daily  Pantoprazole 40mg daily.  Encourage fluid intake.

## 2023-06-16 NOTE — PROGRESS NOTE ADULT - PROBLEM SELECTOR PLAN 9
Has received 2U PRBC on 6/10  No evidence of active bleeding.  transfuse for Hgb less than 7.0 or symptomatic.   Heme/onc following

## 2023-06-16 NOTE — PROGRESS NOTE ADULT - NS ATTEND AMEND GEN_ALL_CORE FT
Impression: This is a 64 Y/O Male from [place] presented to ED for abdominal pain, nausea and vomiting brown emesis. Transferred to  ICU for Hypotension possibly from septic shock secondary to complicated UTI and esophagitis, f/w PsA bacteremia, completed 14d cefepime. 06-09-23 Had RRT due to Hypotension , Fever, Anemia , had s/p PRBC transfusion  . Now resolving Sepsis S/T  P Aeruginosa Bacteremia and complicated UTI.  Had a AMS due to toxic/metabolic encephalopathy. Mental status is back to baseline AXOX3. On PRN Oxygen supplementation due to Chronic Hypoxic Respiratory Failure secondary to COPD.        Suggestion:  O2 saturation 98% room air. Monitor O2 saturation room air trend. Can have Oxygen supplementation 2L NC if needed.   Continue Albuterol 90 mcg 2 puffs Q 6 Hours.   No sedation.  Cryo as per Hematology  and monitor for sign of bleeding    Monitor CBC daily, H/H now stable; if downtrending will need CT chest abd and pelvis .   Wound care for sacral wound.

## 2023-06-16 NOTE — PROGRESS NOTE ADULT - PROBLEM SELECTOR PLAN 12
Heparin d/c secondary to thrombocytopenia.    S/p Cryo.   SCDs for DVT prophylaxis.  Daily CBC ,CMP, coags, fibrinogen, d-dimer  F/U inflammatory markers.   HD may be needed. Will re-eval this weekend.   If catheter needed it will be inserted by intensivist.

## 2023-06-16 NOTE — PROGRESS NOTE ADULT - ASSESSMENT
63M, from [place], AOx3, bedbound, chronic FC, h/o obesity, HTN, HLD, PVD, Seizures, CKD (base SCr 3.8), anemia, BPH, Lymphedema, hypothyroidism, HFpEF, COPD, decubitus ulcer (sacrum/gluteal), polyneuropathy, polyarthritis, bipolar disorder, p/w abd pain a/w "brown” emesis and food intolerance x4d. Admitted for complicated UTI + suspected GIB.  Patient initially had BP of 88/62 but it improved with IVF. Patient admitted to poor oral intake for the past 4 days due to nausea and vomiting.  CT head was negative for acute changes. CT abd showed circumferential wall thickening of distal esophagus. PPI was started.  Patient complains of persistent nausea without vomiting. Reglan was started TID to improve GI motility. Clear liquid diet was started. Hemoglobin was stable around his baseline ~9. Pt had thrombocytopenia with concern for cefepime induced thrombobytopenia.  We are following daily d-dimers and dribrinogen levels with plan to transfuse 10units cryo if PLTs < 100 or if PLTs < 150 with bleeding. PLTs have been stable in 70-50's and Heparin subQ was started for DVT prophylaxis.    Patient had chronic daley from Dec 2022 and he failed TOV. UA was positive. Patient was started on Cefepime. Blood cultures and urine cultures were sent.  Wound care was consulted for sacral pressure ulcer.    Patient presented with BUN/Cr 103/4.98 (Baseline Cr 3.6~3.8). Patient received IVF and SCr has been slowly improving. Dr. Valenzuela nephrologist is on board.    Patient presented with Hypokelamia with K 3.1->2.8, likely due to vomiting and decreased PO intake. It was repleted IV and oral.    06/16: Thrombocytopenia improving. S/p Cryo. Hypernatremic with worsening SHEYLA on CKD. Likely pre-renal. Discussed with Nephrology. Albumin and light hydration. Anasarca. Extended dwell to RUE with redness and swelling around IV site. Afebrile.   S/p ABX.

## 2023-06-16 NOTE — PROGRESS NOTE ADULT - ASSESSMENT
Assessment and Plan:   · Assessment	    #Normocytic Anemia  #Thrombocytopenia  GNR sepsis, hypotension d/t UTI, esophagitis  SHEYLA on CKD  repeat BCx now (-)  Hgb=8.5 stable  no active bleeding/ecchymosis  LFT's nl  Plt=88k-->81k->71-->46k  elevated PT/PTT  PF4 (-)  peripheral smear NO schistocytes  hemolysis (-)  iron panel c/w ACD, hyperferritinemia likely d/t reactive process  Low TSH  Rec's:  -etiology thrombocytopenia multi-factorial GNR sepsis/Esophagitis/on valproate sodium  -etiology anemia SHEYLA on CKD, infxn, nutritional, drug induced  -H/H stable, s/p repeat EGD resolving esophagitis, no bleeding, cont PPI  -Platelet   was trending down and had  fibrinogen 93 and D-dimer increased to 282, DIC w/u +  s/p Cryo tx  with improvement in Fibrinogen levels   ------>give cryo 5u then repeat fibrinogen 2 hours after infusion  ----->d/c heparin  -INR yest 1.7, repeat, if active bleeding consider giving vitamin K  -supportive care  -pt is on depakote which can cause thrombocytopenia  -incorrect Mixing study done, repeat PT/PTT mixing study, repeat PT/PTT, LFT's  -hepatitis panel (-), repeat [id number] (pt does have polyarthritis), B/12, folate, ROSANNE, FABRICE nl, SPEP nl, RF nl  -consider checking inflammatory markers  -Check fibrinogen daily, D-dimer daily  -Cryo indicated if fibrinogen <100   -PRBC transfusion if Hgb <7.0 or symptomatic  -GI on board, pt with esophagitis, on PPI  -Transfuse SDP if Plt <20k in setting of sepsis  -avoid non-essential meds that can exacerbate plt drop (i.e. H2 blocker)  -monitor CBC daily  -always check post SDP transfusion plt level within 1 hour to confirm adequate response    Thank you for the referral. Will continue to monitor the patient.  Please call with any questions [phone]    [facility]  [id number], [address], [place], [place]  [phone]

## 2023-06-17 NOTE — PROGRESS NOTE ADULT - PROBLEM SELECTOR PLAN 8
Na 149  Started on 1/2 NS @ 50 ml/hr with Na goal < 144  F/u Urine Na, Cr and Osmolality. Na 149 >>150   Changed from 1/2 NS to D5 @ 50 ml/hr for 10 hrs.   Noted Urine Na, Cr and Osmolality.  Goal of Na < 144  Will initiate free water flushes once NG tube in.   Dietitian consult.

## 2023-06-17 NOTE — PROGRESS NOTE ADULT - SUBJECTIVE AND OBJECTIVE BOX
Patient is a 63y old  Male who presents with a chief complaint of sepsis (15 Carlin 2023 16:55)    PATIENT IS SEEN AND EXAMINED IN SCU.    MEDICATIONS  (STANDING):  albumin human 25% IVPB 50 milliLiter(s) IV Intermittent every 8 hours  albuterol    90 MICROgram(s) HFA Inhaler 2 Puff(s) Inhalation every 6 hours  buPROPion XL (24-Hour) . 150 milliGRAM(s) Oral daily  dextrose 5%. 1000 milliLiter(s) (50 mL/Hr) IV Continuous <Continuous>  divalproex  milliGRAM(s) Oral two times a day  dorzolamide 2% Ophthalmic Solution 1 Drop(s) Right EYE <User Schedule>  finasteride 5 milliGRAM(s) Oral daily  nystatin Powder 1 Application(s) Topical two times a day  pantoprazole  Injectable 40 milliGRAM(s) IV Push every 24 hours  sucralfate suspension 1 Gram(s) Oral four times a day  tamsulosin 0.4 milliGRAM(s) Oral at bedtime  traZODone 50 milliGRAM(s) Oral at bedtime  vitamin A &amp; D Ointment 1 Application(s) Topical at bedtime    MEDICATIONS  (PRN):  ondansetron Injectable 4 milliGRAM(s) IV Push every 8 hours PRN Nausea and/or Vomiting  sodium chloride 0.9% lock flush 10 milliLiter(s) IV Push every 1 hour PRN Pre/post blood products, medications, blood draw, and to maintain line patency    Vital Signs Last 24 Hrs  T(C): 36.4 (06-17-23 @ 15:19), Max: 36.6 (06-17-23 @ 05:00)  T(F): 97.5 (06-17-23 @ 15:19), Max: 97.9 (06-17-23 @ 05:00)  HR: 89 (06-17-23 @ 15:19) (87 - 93)  BP: 116/60 (06-17-23 @ 15:19) (94/52 - 119/99)  BP(mean): --  RR: 20 (06-17-23 @ 15:19) (17 - 24)  SpO2: 100% (06-17-23 @ 15:19) (99% - 100%)            .Sputum Sputum  06-10 @ 10:23   Normal Respiratory Ivelisse present  --    Few polymorphonuclear leukocytes per low power field  Few Squamous epithelial cells per low power field  Rare Yeast like cells per oil power field  Few Gram Variable Rods per oil power field      .Blood Blood-Peripheral  06-10 @ 04:42   No Growth Final  --  --      .Blood Blood-Peripheral  06-10 @ 03:40   No Growth Final  --  --      .Blood Blood  06-01 @ 10:52   No Growth Final  --  --      .Blood Blood  06-01 @ 10:46   No Growth Final  --  --      .Blood Blood-Peripheral  05-28 @ 21:09   No Growth Final  --  Blood Culture PCR  Pseudomonas aeruginosa      Clean Catch Clean Catch (Midstream)  05-28 @ 21:02   >=3 organisms. Probable collection contamination.  --  --      PHYSICAL EXAMINATION:    GENERAL APPEARANCE: NO DISTRESS  HEENT:  NO PALLOR, NO  JVD,  NO   NODES, NECK SUPPLE  CVS: S1 +, S2 +,   RS: AEEB,  OCCASIONAL  RALES +,   NO RONCHI  ABD: SOFT, NT, NO, BS +  EXT: PE+  SKIN: WARM,   SKELETAL:  ROM REDUCED AT CERVICAL & LS SPINE  CNS:  AAO X 2-3    Vital Signs Last 24 Hrs  T(C): 36.4 (06-17-23 @ 15:19), Max: 36.6 (06-17-23 @ 05:00)  T(F): 97.5 (06-17-23 @ 15:19), Max: 97.9 (06-17-23 @ 05:00)  HR: 89 (06-17-23 @ 15:19) (87 - 93)  BP: 116/60 (06-17-23 @ 15:19) (94/52 - 119/99)  BP(mean): --  RR: 20 (06-17-23 @ 15:19) (17 - 24)  SpO2: 100% (06-17-23 @ 15:19) (99% - 100%)      RADIOLOGY :    RADIOLOGY AND READINGS REVIEWED    ASSESSMENT :       PLAN:  HPI:  64 y/o M, A&Ox3, bedbound, chronic daley catheter with CKD (baseline creat 3.8), COPD, CHFpEF, BPH, PVD, Lymphedema, decubitus ulcers (Sacrum/gluteal) , Anemia, poly-neuropathy, poly-arthritis, hypothyroidism, seizures and Bipolar disorder was BIB EMS from [facility] for abdominal pain, [place]. Mr. Rhoades states that he developed acute onset sharp abdominal pain 4 days ago a/w "brown" emesis (witnessed by EMS). Unable to tolerate food/drink x 4 days. Having normal bowel movements, last this morning 5/28. Denies any fevers. NH paperwork states concern for obstruction.  Patient endorses BLOODY EMESIS x 4 days upon further questioning. (29 May 2023 01:36)      # [6/9] - RRT CALLED FOR HYPOTENSION, FEVER AND ANEMIA - PATIENT GIVEN PRBC TRANSFUSION, VANCOMYCIN ADDED, TRANSFERRED FROM SCU TO ICU    # RESOLVING SEPSIS S/T P.AERUGINOSA BACTEREMIA + COMPLICATED UTI    - S/P ON CEFEPIME [AND VANCOMYCIN], BCX [P. AERUGINOSA] AND UCX [ > 3 ORGANISMS], REPEAT BCX - NGTD  - ID CONSULT  - CRITICAL CARE EVALUATION IN PROGRESS    - S/P VASOPRESSORS    # RESOLVING GI BLEED  # ESOPHAGITIS  # ANEMIA OF CHRONIC DISEASE   # THROMBOCYTOPENIA  - MONITOR HGB, TRANSFUSION THRESHOLD HGB < 8  - TREND PLT  - S/P EGD - ULCERATIVE ESOPHAGITIS  - PPI BID  - CARAFATE QID  - ADVANCING DIET PER GI RECOMMENDATION   - GI CONSULT  - HEME/ONC CONSULT    - [6/6] - EPISODE OF NAUSEA/VOMITING - MONITORING CLOSELY, PRN ANTIEMETICS    - [6/10] - GIVEN PRBC TRANSFUSION, PLANNED FOR 1 MORE UNIT OF PRBC, PLT AND FFP, DEPAKOTE STOPPED GIVEN THROMBOCYTOPENIA    - [6/12] - PLANNED FOR POSSIBLE EGD    - 6/12 - REPEAT EGD - IMPROVING ESOPHAGITIS, SHALLOW DIVERTICULUM IN DISTAL ESOPHAGUS, SINGLE INFLAMMATORY NODULE IN ANTRUM    # ANEMIA DUE TO GI BLEED, ANEMIA OF CKD     # CHRONIC HYPOXIC RESPIRATORY FAILURE S/T UNDERLYING COPD  - ON PRN O2  - CRITICAL CARE EVALUATION IN PROGRESS    # THROMBOCYTOPENIA  - W/UP IN PROGRESS  - HEME/ONC CONSULT    # AMS DUE TO ACUTE METABOLIC ENCEPHALOPATHY    # SHEYLA ON CKD STAGE 4  - WORSENING  # CHRONIC DALEY, BPH  - DALEY   - STRICT IS AND OS  - AVOID NEPHROTOXIC AGENTS  - MONITOR CR  - NEPHROLOGY CONSULT    - [6/7] - URINARY RETENTION OVERNIGHT - DALEY PLACED    # AMBULATORY DYSFUNCTION, IMPAIRED GAIT DUE TO GENERALIZED MUSCLE WEAKNESS, CERVICAL & LS SPINAL STENOSIS, POLYARTRHITIS & PERIPHERAL NEUROPATHY. OP  - FALL PRECAUTIONS    # DYSPHAGIA DUE TO METABOLIC ENCEPHALOPATHY - ON MODIFIED DIET PER SWALLOW EVAL     # HX OF SEIZURE DX  - ON VALPROIC ACID [STOPPED GIVEN THROMBOCYTOPENIA]  - DEPAKOTE    # HYPOTHYROIDISM - ON LEVOTHYROXINE    # PRESSURE ULCERS  - PATIENT IS HIGH RISK FOR PRESSURE ULCERS DESPITE PREVENTIVE MEASURES IN PLACE  - WOUND CARE CONSULT    # BIPOLAR DISORDER - ON DEPAKOTE, WELLBUTRIN XL    # GI PPX

## 2023-06-17 NOTE — PROGRESS NOTE ADULT - PROBLEM SELECTOR PLAN 11
CKD stage 4  Daily CMP and phos level  Dr Olivier following  Continue to trend creatinine  Strict I&Os  Encourage water intake.    # SHEYLA on CKD   Scr 5.02 worsening. No plan for HD at this time.   Albumin and gentle hydration.   Through the weekend if HD needed, Intensivist will insert catheter.   Discussed with Nephro CKD stage 4  Daily CMP and phos level  Dr Olivier following  Continue to trend creatinine  Strict I&Os  Encourage water intake.    # SHEYLA on CKD   Scr 5.02>>5.17   No plan for HD at this time.   Albumin x 2 days and gentle hydration.   Through the weekend if HD needed, Intensivist will insert catheter.   Discussed with Nephro

## 2023-06-17 NOTE — PROGRESS NOTE ADULT - NS ATTEND AMEND GEN_ALL_CORE FT
This is a 63 year old man , from [place], [facility], ambulates with walker , chronic FC, h/o obesity, HTN, HLD, PVD, Seizures, CKD4 (base SCr 3.8), anemia, BPH, Lymphedema, hypothyroidism, HFpEF, possible COPD, decubitus ulcer (sacrum/gluteal), polyneuropathy, polyarthritis, bipolar disorder. Patient presented to ED for abdominal pain, nausea and vomiting brown emesis. Transferred to  ICU for Hypotension possibly from septic shock secondary to complicated UTI and esophagitis, f/w PsA bacteremia, completed 14d cefepime. Noted to have low H/H but no overt sign of bleeding s/p 2 unit PRBCs total (last 6/9). Possible component of hypovolemia. AMS due to toxic/metabolic encephalopathy, improved.      Plan:  - O2 supp as needed   - AMS due to encephalopathy; mentation improved but appears more lethargic today. Awakens easily to voice and answering questions appropriately  - Depakote for seizure disorder  - Hemodynamic monitoring: overnight with 1 recording of tachycardia/hypotension that resolved without intervention, unclear if vasovagal response? this AM with normal hemodynamics and no s/s active bleed  - H/H downtrending but without s/s active bleed and slow downward trend. Will transfuse 1UpRBCs given possible symptomatic anemia  - 1U plts for thrombocytopenia, will attempt to place NGT for TF for nutrition given low PO intake  - Albumin iv   - antiemetics and scheduled prokinetics  - tolerating carafate  - SHEYLA worsening but lytes acceptable and making urine; per nephrology if continuing to worsen will likely require HD in near future. To discuss with NOK/HCP  - GI eval appreciated; "repeat EGD 6/12: 95% prio esophagitis healed, inflammatory nodule in antrum, shallow diverticulum seen towards distal esophagus"  - completed cefepime x 14d, s/p course of vancomycin  - Wound care for sacral wound        Rosemary Davalos MD  Pulmonary & Critical Care  Available on Teams.

## 2023-06-17 NOTE — PROGRESS NOTE ADULT - PROBLEM SELECTOR PLAN 9
Has received 2U PRBC on 6/10  No evidence of active bleeding.  transfuse for Hgb less than 7.0 or symptomatic.   Heme/onc following Has received 2U PRBC on 6/10  No evidence of active bleeding.  transfuse for Hgb less than 7.0 or symptomatic.   Heme/onc following  Patient pale and hypotensive overnight. Also lethargic.   Will transfuse 1 PRBC today for Hgb of 7.4

## 2023-06-17 NOTE — PROGRESS NOTE ADULT - ASSESSMENT
SHEYLA: Likely ATN on CKD after shock and bleeding. His Cr has worsened again. Urine Output is > 50 mL/hour.  He may need HD if his azotemia is worsening. He may be intravascular volume depleted.  - Albumin 25% IVP q 8 hours for 2 days.  - Gentle hydration.  - Hold HD for now.  - Follow up BMP.  - Maintain daley for now.        Metabolic Acidosis:  Non AG, likely Distal RTA.  Associated with relatively low K.  - Monitor BMP.    Hypernatremia:  Possible due to Volume depletion.  - Free water via  ml q 6 hours.  - Start Hypotonic fluids, preferable D5W at 50 mL/hour until Na < 144.    CKD  4  -Renal diet.  - Avoid Nephrotoxins.  - follow up BMP.    Anemia of CKD:  - Transfuse as needed for Hg < 7.    Hypokalemia:  Likely Distal RTA. Mg is wnl. Improved today.  - Monitor K and Mg.  - Supplement as needed.

## 2023-06-17 NOTE — PROGRESS NOTE ADULT - PROBLEM SELECTOR PLAN 10
Chronic Viera Failed multiple TOV.  C/w Flomax and finasteride when vomiting stops. Chronic Viera Failed multiple TOV.  C/w Flomax and finasteride when vomiting stops.  Monitor urine output

## 2023-06-17 NOTE — PROGRESS NOTE ADULT - SUBJECTIVE AND OBJECTIVE BOX
GAYLA PEARCE  63y  Patient is a 63y old  Male who presents with a chief complaint of sepsis (17 Jun 2023 13:57)    HPI:  Seen and examined. Poor appetite, NGT inserted.    HEALTH ISSUES - PROBLEM Dx:  GIB (gastrointestinal bleeding)    Sepsis    Anemia    Hypothyroid    CKD (chronic kidney disease)    BPH (benign prostatic hyperplasia)    Bipolar disorder    Advance care planning    Seizure    Esophagitis    Hypokalemia    Ambulatory dysfunction    Prophylactic measure    Electrolyte depletion    Acute encephalopathy    Acute kidney injury superimposed on CKD    Ulcerative esophagitis    Decubitus ulcers    Functional quadriplegia    Moderate protein-calorie malnutrition    Encounter for palliative care    Chronic heart failure with preserved ejection fraction (HFpEF)    Sepsis with acute hypoxic respiratory failure    Protein calorie malnutrition    Palliative care encounter    SHEYLA (acute kidney injury)    Palliative care encounter    Debility    Severe protein-calorie malnutrition    Encephalopathy, unspecified    Thrombocytopenia    Hypernatremia    At risk for thrombophlebitis          MEDICATIONS  (STANDING):  albumin human 25% IVPB 50 milliLiter(s) IV Intermittent every 8 hours  albuterol    90 MICROgram(s) HFA Inhaler 2 Puff(s) Inhalation every 6 hours  buPROPion XL (24-Hour) . 150 milliGRAM(s) Oral daily  dextrose 5%. 1000 milliLiter(s) (50 mL/Hr) IV Continuous <Continuous>  divalproex  milliGRAM(s) Oral two times a day  dorzolamide 2% Ophthalmic Solution 1 Drop(s) Right EYE <User Schedule>  finasteride 5 milliGRAM(s) Oral daily  nystatin Powder 1 Application(s) Topical two times a day  pantoprazole  Injectable 40 milliGRAM(s) IV Push every 24 hours  sucralfate suspension 1 Gram(s) Oral four times a day  tamsulosin 0.4 milliGRAM(s) Oral at bedtime  traZODone 50 milliGRAM(s) Oral at bedtime  vitamin A &amp; D Ointment 1 Application(s) Topical at bedtime    MEDICATIONS  (PRN):  ondansetron Injectable 4 milliGRAM(s) IV Push every 8 hours PRN Nausea and/or Vomiting  sodium chloride 0.9% lock flush 10 milliLiter(s) IV Push every 1 hour PRN Pre/post blood products, medications, blood draw, and to maintain line patency    Vital Signs Last 24 Hrs  T(C): 36.4 (17 Jun 2023 15:19), Max: 36.6 (17 Jun 2023 05:00)  T(F): 97.5 (17 Jun 2023 15:19), Max: 97.9 (17 Jun 2023 05:00)  HR: 89 (17 Jun 2023 15:19) (87 - 93)  BP: 116/60 (17 Jun 2023 15:19) (94/52 - 119/99)  BP(mean): --  RR: 20 (17 Jun 2023 15:19) (17 - 24)  SpO2: 100% (17 Jun 2023 15:19) (99% - 100%)    Parameters below as of 17 Jun 2023 15:19  Patient On (Oxygen Delivery Method): nasal cannula  O2 Flow (L/min): 2    Daily     Daily     PHYSICAL EXAM:  Constitutional:   appears comfortable and not distressed. Not diaphoretic.    Neck:  The thyroid is normal. Trachea is midline.     Breasts: Normal examination.    Respiratory: The lungs are clear to auscultation. No dullness and expansion is normal.    Cardiovascular: S1 and S2 are normal. No mummurs, rubs or gallops are present.    Gastrointestinal: The abdomen is soft. No tenderness is present. No masses are present. Bowel sounds are normal.    Genitourinary: The bladder is not distended. No CVA tenderness is present.    Extremities: No edema is noted. No deformities are present.    Neurological: Cognition is normal. Tone, power and sensation are normal. Gait is steady.    Skin: No leasions are seen  or palpated.    Lymph Nodes: No lymphadenopathy is present.    Psychiatric: Mood is appropriate. No hallucinations or flight of ideas are noted.                              7.4    4.39  )-----------( 49       ( 17 Jun 2023 07:40 )             24.8     06-17    150<H>  |  119<H>  |  45<H>  ----------------------------<  71  4.1   |  18<L>  |  5.17<H>    Ca    9.7      17 Jun 2023 07:40  Phos  4.0     06-17  Mg     2.0     06-17    TPro  5.2<L>  /  Alb  2.0<L>  /  TBili  0.5  /  DBili  x   /  AST  13  /  ALT  8<L>  /  AlkPhos  90  06-17

## 2023-06-17 NOTE — CHART NOTE - NSCHARTNOTEFT_GEN_A_CORE
Asked by primary team for assistance with IV access. Sterile placement of US-guided [id number] catheter in left AC performed.

## 2023-06-17 NOTE — PROGRESS NOTE ADULT - SUBJECTIVE AND OBJECTIVE BOX
Time of Visit:  Patient seen and examined. pat is laying in bed awake     MEDICATIONS  (STANDING):  albumin human 25% IVPB 50 milliLiter(s) IV Intermittent every 8 hours  albuterol    90 MICROgram(s) HFA Inhaler 2 Puff(s) Inhalation every 6 hours  buPROPion XL (24-Hour) . 150 milliGRAM(s) Oral daily  divalproex  milliGRAM(s) Oral two times a day  dorzolamide 2% Ophthalmic Solution 1 Drop(s) Right EYE <User Schedule>  finasteride 5 milliGRAM(s) Oral daily  nystatin Powder 1 Application(s) Topical two times a day  pantoprazole  Injectable 40 milliGRAM(s) IV Push every 24 hours  sodium chloride 0.45%. 1000 milliLiter(s) (50 mL/Hr) IV Continuous <Continuous>  sucralfate suspension 1 Gram(s) Oral four times a day  tamsulosin 0.4 milliGRAM(s) Oral at bedtime  traZODone 50 milliGRAM(s) Oral at bedtime  vitamin A &amp; D Ointment 1 Application(s) Topical at bedtime      MEDICATIONS  (PRN):  ondansetron Injectable 4 milliGRAM(s) IV Push every 8 hours PRN Nausea and/or Vomiting  sodium chloride 0.9% lock flush 10 milliLiter(s) IV Push every 1 hour PRN Pre/post blood products, medications, blood draw, and to maintain line patency       Medications up to date at time of exam.      PHYSICAL EXAMINATION:  Patient has no new complaints.  GENERAL: The patient is a well-developed, well-nourished, in no apparent distress.     Vital Signs Last 24 Hrs  T(C): 36.3 (17 Jun 2023 10:18), Max: 36.9 (16 Jun 2023 21:37)  T(F): 97.4 (17 Jun 2023 10:18), Max: 98.5 (16 Jun 2023 21:37)  HR: 87 (17 Jun 2023 13:05) (87 - 140)  BP: 104/56 (17 Jun 2023 13:05) (82/43 - 119/99)  BP(mean): --  RR: 24 (17 Jun 2023 13:05) (17 - 24)  SpO2: 99% (17 Jun 2023 13:05) (99% - 100%)    Parameters below as of 17 Jun 2023 13:05    O2 Flow (L/min): 2     (if applicable)    Chest Tube (if applicable)    HEENT: Head is normocephalic and atraumatic. Extraocular muscles are intact. Mucous membranes are moist.     NECK: Supple, no palpable adenopathy.    LUNGS: Clear to auscultation, no wheezing, rales, or rhonchi.    HEART: Regular rate and rhythm without murmur.    ABDOMEN: Soft, nontender, and nondistended.  No hepatosplenomegaly is noted.    : No painful voiding, no pelvic pain    EXTREMITIES: + edema and chronic skin changes     NEUROLOGIC: Awake, alert, oriented, grossly intact    SKIN: Warm, dry, good turgor.      LABS:                        7.4    4.39  )-----------( 49       ( 17 Jun 2023 07:40 )             24.8     06-17    150<H>  |  119<H>  |  45<H>  ----------------------------<  71  4.1   |  18<L>  |  5.17<H>    Ca    9.7      17 Jun 2023 07:40  Phos  4.0     06-17  Mg     2.0     06-17    TPro  5.2<L>  /  Alb  2.0<L>  /  TBili  0.5  /  DBili  x   /  AST  13  /  ALT  8<L>  /  AlkPhos  90  06-17    PT/INR - ( 17 Jun 2023 07:40 )   PT: 18.0 sec;   INR: 1.51 ratio         PTT - ( 17 Jun 2023 07:40 )  PTT:41.6 sec          D-Dimer Assay, Quantitative: <150 ng/mL DDU (06-17-23 @ 07:40)            MICROBIOLOGY: (if applicable)    RADIOLOGY & ADDITIONAL STUDIES:  EKG:   CXR:  ECHO:    IMPRESSION: 63y Male PAST MEDICAL & SURGICAL HISTORY:  Hypothyroid      Hyperlipidemia      Ambulatory dysfunction      Anemia      History of BPH      CKD (chronic kidney disease)      Chronic obstructive pulmonary disease (COPD)      Bipolar disorder      HLD (hyperlipidemia)      BPH (benign prostatic hyperplasia)      Chronic diastolic congestive heart failure      Lymphedema      Chronic leg pain      No significant past surgical history       p/w             IMP: This is a 63 year old man , from [place] [id number], ambulates with walker , chronic FC, h/o obesity, HTN, HLD, PVD, Seizures, CKD4 (base SCr 3.8), anemia, BPH, Lymphedema, hypothyroidism, HFpEF, possible COPD, decubitus ulcer (sacrum/gluteal), polyneuropathy, polyarthritis, bipolar disorder. Patient presented to ED for abdominal pain, nausea and vomiting brown emesis. Transferred to  ICU for Hypotension possibly from septic shock secondary to complicated UTI and esophagitis, f/w PsA bacteremia, completed 14d cefepime. Noted to have low H/H but no overt sign of bleeding s/p 2 unit PRBCs total (last 6/9). Possible component of hypovolemia. AMS due to toxic/metabolic encephalopathy. Mental status is back to baseline .. AAOx 3 . PLT is down trending      Plan:  - O2 supp as needed   - No sedation  - Mental status back to baseline   - Cryo as per Hematology  and monitor for sign of bleeding   - SHEYLA plateau   - S/P blood product transfusion   - Monitor CBC daily, H/H now stable; if downtrending will need CT chest abd and pelvis   - Dose vanco as per serum level for now  - Wound care for sacral wound  - Viera cath  - GI eval appreciated; "repeat EGD 6/12: 95% prio esophagitis healed, inflammatory nodule in antrum, shallow diverticulum seen towards distal esophagus"

## 2023-06-17 NOTE — CHART NOTE - NSCHARTNOTEFT_GEN_A_CORE
Spoke with Germán at length regarding patient's poor prognosis and current clinical situation. Plan of care discussed in detail. All questions were answered. GOC discussed as well. Germán (HCP) wants patient to remain FULL CODE.

## 2023-06-17 NOTE — PROGRESS NOTE ADULT - PROBLEM SELECTOR PLAN 1
Secondary to pseudomonas bacteremia and UTI  Blood culture from 6/11 Negative.  Antibiotics completed.  Continue to monitor off antibiotics. Secondary to pseudomonas bacteremia and UTI  Blood culture from 6/11 Negative.  Antibiotics completed.  Continue to monitor off antibiotics.  Afebrile.

## 2023-06-17 NOTE — PROGRESS NOTE ADULT - PROBLEM SELECTOR PLAN 7
Likely related to medications cefepime and Depakote.  HIT panel negative.   Repeat DIC panel +   Platelet count slightly improving.   Need daily D-dimer and fibrinogen. Numbers improving.   Daily coags  Heme/onc following. S/p 5U cryo.   Heparin d/c secondary to dropping platelet count.  F/U inflammatory markers  Keep Plt > 50 for invasive procedure. SDP tx of plt < 20 or bleeding.   Hem/Onc following and reccs appreciated. Likely related to medications cefepime and Depakote.  HIT panel negative.   Repeat DIC panel +   Platelet count remains low and blue top platelet count of 30.    daily D-dimer and fibrinogen.  Daily coags  Heme/onc following. S/p 5U cryo.   Heparin d/c secondary to dropping platelet count.  F/U inflammatory markers  Keep Plt > 50 for invasive procedure. SDP tx of plt < 20 or bleeding.   Hem/Onc following and reccs appreciated.  Since plan for NG tube insertion will transfuse 1 unit of platelet prior to insertion.

## 2023-06-17 NOTE — PROGRESS NOTE ADULT - ASSESSMENT
63M, from [place], AOx3, bedbound, chronic FC, h/o obesity, HTN, HLD, PVD, Seizures, CKD (base SCr 3.8), anemia, BPH, Lymphedema, hypothyroidism, HFpEF, COPD, decubitus ulcer (sacrum/gluteal), polyneuropathy, polyarthritis, bipolar disorder, p/w abd pain a/w "brown” emesis and food intolerance x4d. Admitted for complicated UTI + suspected GIB.  Patient initially had BP of 88/62 but it improved with IVF. Patient admitted to poor oral intake for the past 4 days due to nausea and vomiting.  CT head was negative for acute changes. CT abd showed circumferential wall thickening of distal esophagus. PPI was started.  Patient complains of persistent nausea without vomiting. Reglan was started TID to improve GI motility. Clear liquid diet was started. Hemoglobin was stable around his baseline ~9. Pt had thrombocytopenia with concern for cefepime induced thrombobytopenia.  We are following daily d-dimers and dribrinogen levels with plan to transfuse 10units cryo if PLTs < 100 or if PLTs < 150 with bleeding. PLTs have been stable in 70-50's and Heparin subQ was started for DVT prophylaxis.    Patient had chronic daley from Dec 2022 and he failed TOV. UA was positive. Patient was started on Cefepime. Blood cultures and urine cultures were sent.  Wound care was consulted for sacral pressure ulcer.    Patient presented with BUN/Cr 103/4.98 (Baseline Cr 3.6~3.8). Patient received IVF and SCr has been slowly improving. Dr. Valenzuela nephrologist is on board.    Patient presented with Hypokelamia with K 3.1->2.8, likely due to vomiting and decreased PO intake. It was repleted IV and oral.    06/16: Thrombocytopenia improving. S/p Cryo. Hypernatremic with worsening SHEYLA on CKD. Likely pre-renal. Discussed with Nephrology. Albumin and light hydration. Anasarca. Extended dwell to RUE with redness and swelling around IV site. Afebrile.   S/p ABX.     06/17:      63M, from [place], AOx3, bedbound, chronic FC, h/o obesity, HTN, HLD, PVD, Seizures, CKD (base SCr 3.8), anemia, BPH, Lymphedema, hypothyroidism, HFpEF, COPD, decubitus ulcer (sacrum/gluteal), polyneuropathy, polyarthritis, bipolar disorder, p/w abd pain a/w "brown” emesis and food intolerance x4d. Admitted for complicated UTI + suspected GIB.  Patient initially had BP of 88/62 but it improved with IVF. Patient admitted to poor oral intake for the past 4 days due to nausea and vomiting.  CT head was negative for acute changes. CT abd showed circumferential wall thickening of distal esophagus. PPI was started.  Patient complains of persistent nausea without vomiting. Reglan was started TID to improve GI motility. Clear liquid diet was started. Hemoglobin was stable around his baseline ~9. Pt had thrombocytopenia with concern for cefepime induced thrombobytopenia.  We are following daily d-dimers and dribrinogen levels with plan to transfuse 10units cryo if PLTs < 100 or if PLTs < 150 with bleeding. PLTs have been stable in 70-50's and Heparin subQ was started for DVT prophylaxis.    Patient had chronic daley from Dec 2022 and he failed TOV. UA was positive. Patient was started on Cefepime. Blood cultures and urine cultures were sent.  Wound care was consulted for sacral pressure ulcer.    Patient presented with BUN/Cr 103/4.98 (Baseline Cr 3.6~3.8). Patient received IVF and SCr has been slowly improving. Dr. Valenzuela nephrologist is on board.    Patient presented with Hypokelamia with K 3.1->2.8, likely due to vomiting and decreased PO intake. It was repleted IV and oral.    06/16: Thrombocytopenia improving. S/p Cryo. Hypernatremic with worsening SHEYLA on CKD. Likely pre-renal. Discussed with Nephrology. Albumin and light hydration. Anasarca. Extended dwell to RUE with redness and swelling around IV site. Afebrile.   S/p ABX.     06/17: Plt blue top 30 and drop in H&H. patient pale and lethargic. One unit of Platelet and 1 PRCBC ordered. Plan for NG tube insertion post platelet transfusion. Hypotensive and tachycardic overnight x 1 event. Improvement this AM. EKG noted. RUE venous doppler US negative for DVT. Hypernatremic with slight increase in SCr. Nephro following. Will start free water flushes once NG tube in. Dietitian consult for enteral feeding assessment and recommendations.

## 2023-06-17 NOTE — PROGRESS NOTE ADULT - PROBLEM SELECTOR PLAN 2
+HX  S/P EGD X2  5/30 and 6/12. No active bleeding found  Transfused 2U PRBCs on 6/10  No s/s of bleeding   Slight drop in H&H.   Transfuse if Hgb < 7.0 +HX  S/P EGD X2  5/30 and 6/12. No active bleeding found  S/p 2U PRBCs on 6/10  No s/s of bleeding   Drop in H&H this AM.   Plan to transfuse 1 unit of PRBC today  CBC check 1 hour post transfusion.

## 2023-06-17 NOTE — PROGRESS NOTE ADULT - NUTRITIONAL ASSESSMENT
This patient has been assessed with a concern for Malnutrition and has been determined to have a diagnosis/diagnoses of Moderate protein-calorie malnutrition.    This patient is being managed with:   Diet Consistent Carbohydrate Clear Liquid-  Mildly Thick Liquids (MILDTHICKLIQS)  Entered: Jun 14 2023  2:51PM    This patient has been assessed with a concern for Malnutrition and has been determined to have a diagnosis/diagnoses of Moderate protein-calorie malnutrition.    This patient is being managed with:   Diet Consistent Carbohydrate Clear Liquid-  Mildly Thick Liquids (MILDTHICKLIQS)  Entered: Jun 14 2023  2:51PM

## 2023-06-17 NOTE — PROGRESS NOTE ADULT - PROBLEM SELECTOR PLAN 12
Heparin d/c secondary to thrombocytopenia.    S/p Cryo.   SCDs for DVT prophylaxis.  Daily CBC ,CMP, coags, fibrinogen, d-dimer  F/U inflammatory markers.   HD may be needed. Will re-eval this weekend.   If catheter needed it will be inserted by intensivist. Heparin d/c secondary to thrombocytopenia.    S/p Cryo.   SCDs for DVT prophylaxis.  Daily CBC ,CMP, coags, fibrinogen, d-dimer  F/U inflammatory markers.   HD may be needed. Will re-eval this weekend.   If catheter needed it will be inserted by intensivist.  Transfusion of 1 PRBC and 1 unit pf platelet.   Plan for NG tube insertion  Dietitian consult.

## 2023-06-17 NOTE — PROGRESS NOTE ADULT - NUTRITIONAL ASSESSMENT
This patient has been assessed with a concern for Malnutrition and has been determined to have a diagnosis/diagnoses of Moderate protein-calorie malnutrition.    This patient is being managed with:   Diet NPO with Tube Feed-  Tube Feeding Modality: Nasogastric  N[place]ro with Carb Steady  Continuous  Until Goal Tube Feed Rate (mL per Hour): 10  Tube Feed Duration (in Hours): 24  Tube Feed Start Time: 17:00  Free Water Flush  Pump   Rate (mL per Hour): 215   Frequency: Every 8 Hours  Free Water Flush Instructions:  Per Nephro  Entered: Jun 17 2023  3:08PM

## 2023-06-17 NOTE — PROGRESS NOTE ADULT - SUBJECTIVE AND OBJECTIVE BOX
GAYLA PEARCE    SCU progress note    INTERVAL HPI/OVERNIGHT EVENTS: Tachycardia and hypotension overnight.     Full code.     Covid - 19 PCR: 6/10/2023 non-reactive.     The 4Ms    What Matters Most: see GOC  Age appropriate Medications/Screen for High Risk Medication: Yes  Mentation: see CAM below  Mobility: defer to physical exam    The Confusion Assessment Method (CAM) Diagnostic Algorithm     1: Acute Onset or Fluctuating Course  - Is there evidence of an acute change in mental status from the patient’s baseline? Did the (abnormal) behavior  fluctuate during the day, that is, tend to come and go, or increase and decrease in severity?  [ ] YES [ x] NO     2: Inattention  - Did the patient have difficulty focusing attention, being easily distractible, or having difficulty keeping track of what was being said?  [ ] YES [x ] NO     3: Disorganized thinking  -Was the patient’s thinking disorganized or incoherent, such as rambling or irrelevant conversation, unclear or illogical flow of ideas, or unpredictable switching from subject to subject?  [ ] YES [x ] NO    4: Altered Level of consciousness?  [ ] YES [x ] NO    The diagnosis of delirium by CAM requires the presence of features 1 and 2 and either 3 or 4.    PRESSORS: [ ] YES [x ] NO    Cardiovascular:  Heart Failure  Acute   Acute on Chronic  Chronic         Pulmonary:  albuterol    90 MICROgram(s) HFA Inhaler 2 Puff(s) Inhalation every 6 hours    Hematalogic:    Other:  albumin human 25% IVPB 50 milliLiter(s) IV Intermittent every 8 hours  buPROPion XL (24-Hour) . 150 milliGRAM(s) Oral daily  divalproex  milliGRAM(s) Oral two times a day  dorzolamide 2% Ophthalmic Solution 1 Drop(s) Right EYE <User Schedule>  finasteride 5 milliGRAM(s) Oral daily  nystatin Powder 1 Application(s) Topical two times a day  ondansetron Injectable 4 milliGRAM(s) IV Push every 8 hours PRN  pantoprazole  Injectable 40 milliGRAM(s) IV Push every 24 hours  sodium chloride 0.45%. 1000 milliLiter(s) IV Continuous <Continuous>  sodium chloride 0.9% lock flush 10 milliLiter(s) IV Push every 1 hour PRN  sucralfate suspension 1 Gram(s) Oral four times a day  tamsulosin 0.4 milliGRAM(s) Oral at bedtime  traZODone 50 milliGRAM(s) Oral at bedtime  vitamin A &amp; D Ointment 1 Application(s) Topical at bedtime    albumin human 25% IVPB 50 milliLiter(s) IV Intermittent every 8 hours  albuterol    90 MICROgram(s) HFA Inhaler 2 Puff(s) Inhalation every 6 hours  buPROPion XL (24-Hour) . 150 milliGRAM(s) Oral daily  divalproex  milliGRAM(s) Oral two times a day  dorzolamide 2% Ophthalmic Solution 1 Drop(s) Right EYE <User Schedule>  finasteride 5 milliGRAM(s) Oral daily  nystatin Powder 1 Application(s) Topical two times a day  ondansetron Injectable 4 milliGRAM(s) IV Push every 8 hours PRN  pantoprazole  Injectable 40 milliGRAM(s) IV Push every 24 hours  sodium chloride 0.45%. 1000 milliLiter(s) IV Continuous <Continuous>  sodium chloride 0.9% lock flush 10 milliLiter(s) IV Push every 1 hour PRN  sucralfate suspension 1 Gram(s) Oral four times a day  tamsulosin 0.4 milliGRAM(s) Oral at bedtime  traZODone 50 milliGRAM(s) Oral at bedtime  vitamin A &amp; D Ointment 1 Application(s) Topical at bedtime    Drug Dosing Weight  Height (cm): 172.7 (09 Jun 2023 21:00)  Weight (kg): 84.2 (09 Jun 2023 21:00)  BMI (kg/m2): 28.2 (09 Jun 2023 21:00)  BSA (m2): 1.98 (09 Jun 2023 21:00)    CENTRAL LINE: [ ] YES [x ] NO  LOCATION:   DATE INSERTED:  REMOVE: [ ] YES [ ] NO  EXPLAIN:    HATFIELD: [x ] YES [ ] NO    DATE INSERTED:  REMOVE:  [ ] YES [ ] NO  EXPLAIN:    PAST MEDICAL & SURGICAL HISTORY:  Hypothyroid      Hyperlipidemia      Ambulatory dysfunction      Anemia      History of BPH      CKD (chronic kidney disease)      Chronic obstructive pulmonary disease (COPD)      Bipolar disorder      HLD (hyperlipidemia)      BPH (benign prostatic hyperplasia)      Chronic diastolic congestive heart failure      Lymphedema      Chronic leg pain      No significant past surgical history      06-16 @ 07:01  -  06-17 @ 07:00  --------------------------------------------------------  IN: 0 mL / OUT: 400 mL / NET: -400 mL    PHYSICAL EXAM:    GENERAL: NAD, Ill-appearing  HEAD:  Atraumatic, Normocephalic  EYES:PERRLA, conjunctiva and sclera clear  ENMT:Moist mucous membranes.   NECK: Supple  NERVOUS SYSTEM:  Lethargic & Oriented X2 (confused to month). Good concentration;   CHEST/LUNG: Diminished bilaterally; No rales, rhonchi, wheezing, or rubs. On 2 L NC. 99% Sats.   HEART: Regular rate and rhythm; No murmurs, rubs, or gallops  ABDOMEN: Soft, diffused tenderness to palpation, no guarding.  Nondistended; Bowel sounds hypoactive.   EXTREMITIES:  2+ Peripheral Pulses, No clubbing, cyanosis, Anasarca. Lower exts +2 pitting edema. Cap refill < 3 sec toes  SKIN: Sacral wound. Ecchymosis  to abdomen, arms and legs.       LABS:  CBC Full  -  ( 17 Jun 2023 07:40 )  WBC Count : 4.39 K/uL  RBC Count : 2.51 M/uL  Hemoglobin : 7.4 g/dL  Hematocrit : 24.8 %  Platelet Count - Automated : 49 K/uL  Mean Cell Volume : 98.8 fl  Mean Cell Hemoglobin : 29.5 pg  Mean Cell Hemoglobin Concentration : 29.8 gm/dL  Auto Neutrophil # : 2.90 K/uL  Auto Lymphocyte # : 0.88 K/uL  Auto Monocyte # : 0.31 K/uL  Auto Eosinophil # : 0.13 K/uL  Auto Basophil # : 0.00 K/uL  Auto Neutrophil % : 63.0 %  Auto Lymphocyte % : 20.0 %  Auto Monocyte % : 7.0 %  Auto Eosinophil % : 3.0 %  Auto Basophil % : 0.0 %    06-17    150<H>  |  119<H>  |  45<H>  ----------------------------<  71  4.1   |  18<L>  |  5.17<H>    Ca    9.7      17 Jun 2023 07:40  Phos  4.0     06-17  Mg     2.0     06-17    TPro  5.2<L>  /  Alb  2.0<L>  /  TBili  0.5  /  DBili  x   /  AST  13  /  ALT  8<L>  /  AlkPhos  90  06-17    PT/INR - ( 17 Jun 2023 07:40 )   PT: 18.0 sec;   INR: 1.51 ratio         PTT - ( 17 Jun 2023 07:40 )  PTT:41.6 sec    [x  ]  DVT Prophylaxis: SCDs.   [ x ]  Nutrition:     RADIOLOGY & ADDITIONAL STUDIES:           GAYLA PEARCE    SCU progress note    INTERVAL HPI/OVERNIGHT EVENTS: Tachycardia and hypotension overnight.     Full code.     Covid - 19 PCR: 6/10/2023 non-reactive.     The 4Ms    What Matters Most: see GOC  Age appropriate Medications/Screen for High Risk Medication: Yes  Mentation: see CAM below  Mobility: defer to physical exam    The Confusion Assessment Method (CAM) Diagnostic Algorithm     1: Acute Onset or Fluctuating Course  - Is there evidence of an acute change in mental status from the patient’s baseline? Did the (abnormal) behavior  fluctuate during the day, that is, tend to come and go, or increase and decrease in severity?  [ ] YES [ x] NO     2: Inattention  - Did the patient have difficulty focusing attention, being easily distractible, or having difficulty keeping track of what was being said?  [ ] YES [x ] NO     3: Disorganized thinking  -Was the patient’s thinking disorganized or incoherent, such as rambling or irrelevant conversation, unclear or illogical flow of ideas, or unpredictable switching from subject to subject?  [ ] YES [x ] NO    4: Altered Level of consciousness?  [ ] YES [x ] NO    The diagnosis of delirium by CAM requires the presence of features 1 and 2 and either 3 or 4.    PRESSORS: [ ] YES [x ] NO    Cardiovascular:  Heart Failure  Acute   Acute on Chronic  Chronic         Pulmonary:  albuterol    90 MICROgram(s) HFA Inhaler 2 Puff(s) Inhalation every 6 hours    Hematalogic:    Other:  albumin human 25% IVPB 50 milliLiter(s) IV Intermittent every 8 hours  buPROPion XL (24-Hour) . 150 milliGRAM(s) Oral daily  divalproex  milliGRAM(s) Oral two times a day  dorzolamide 2% Ophthalmic Solution 1 Drop(s) Right EYE <User Schedule>  finasteride 5 milliGRAM(s) Oral daily  nystatin Powder 1 Application(s) Topical two times a day  ondansetron Injectable 4 milliGRAM(s) IV Push every 8 hours PRN  pantoprazole  Injectable 40 milliGRAM(s) IV Push every 24 hours  sodium chloride 0.45%. 1000 milliLiter(s) IV Continuous <Continuous>  sodium chloride 0.9% lock flush 10 milliLiter(s) IV Push every 1 hour PRN  sucralfate suspension 1 Gram(s) Oral four times a day  tamsulosin 0.4 milliGRAM(s) Oral at bedtime  traZODone 50 milliGRAM(s) Oral at bedtime  vitamin A &amp; D Ointment 1 Application(s) Topical at bedtime    albumin human 25% IVPB 50 milliLiter(s) IV Intermittent every 8 hours  albuterol    90 MICROgram(s) HFA Inhaler 2 Puff(s) Inhalation every 6 hours  buPROPion XL (24-Hour) . 150 milliGRAM(s) Oral daily  divalproex  milliGRAM(s) Oral two times a day  dorzolamide 2% Ophthalmic Solution 1 Drop(s) Right EYE <User Schedule>  finasteride 5 milliGRAM(s) Oral daily  nystatin Powder 1 Application(s) Topical two times a day  ondansetron Injectable 4 milliGRAM(s) IV Push every 8 hours PRN  pantoprazole  Injectable 40 milliGRAM(s) IV Push every 24 hours  sodium chloride 0.45%. 1000 milliLiter(s) IV Continuous <Continuous>  sodium chloride 0.9% lock flush 10 milliLiter(s) IV Push every 1 hour PRN  sucralfate suspension 1 Gram(s) Oral four times a day  tamsulosin 0.4 milliGRAM(s) Oral at bedtime  traZODone 50 milliGRAM(s) Oral at bedtime  vitamin A &amp; D Ointment 1 Application(s) Topical at bedtime    Drug Dosing Weight  Height (cm): 172.7 (09 Jun 2023 21:00)  Weight (kg): 84.2 (09 Jun 2023 21:00)  BMI (kg/m2): 28.2 (09 Jun 2023 21:00)  BSA (m2): 1.98 (09 Jun 2023 21:00)    CENTRAL LINE: [ ] YES [x ] NO  LOCATION:   DATE INSERTED:  REMOVE: [ ] YES [ ] NO  EXPLAIN:    HATFIELD: [x ] YES [ ] NO    DATE INSERTED:  REMOVE:  [ ] YES [ ] NO  EXPLAIN:    PAST MEDICAL & SURGICAL HISTORY:  Hypothyroid      Hyperlipidemia      Ambulatory dysfunction      Anemia      History of BPH      CKD (chronic kidney disease)      Chronic obstructive pulmonary disease (COPD)      Bipolar disorder      HLD (hyperlipidemia)      BPH (benign prostatic hyperplasia)      Chronic diastolic congestive heart failure      Lymphedema      Chronic leg pain      No significant past surgical history      06-16 @ 07:01  -  06-17 @ 07:00  --------------------------------------------------------  IN: 0 mL / OUT: 400 mL / NET: -400 mL    PHYSICAL EXAM:    GENERAL: NAD, Ill-appearing  HEAD:  Atraumatic, Normocephalic  EYES:PERRLA, conjunctiva and sclera clear  ENMT:Moist mucous membranes.   NECK: Supple  NERVOUS SYSTEM:  Lethargic & Oriented X2 (confused to month). Good concentration;   CHEST/LUNG: Diminished bilaterally; No rales, rhonchi, wheezing, or rubs. On 2 L NC. 99% Sats.   HEART: Regular rate and rhythm; No murmurs, rubs, or gallops  ABDOMEN: Soft, diffused tenderness to palpation, no guarding.  Nondistended; Bowel sounds hypoactive.   EXTREMITIES:  2+ Peripheral Pulses, No clubbing, cyanosis, Anasarca. Lower exts +2 pitting edema. Cap refill < 3 sec toes  SKIN: Sacral wound. Ecchymosis  to abdomen, arms and legs.       LABS:  CBC Full  -  ( 17 Jun 2023 07:40 )  WBC Count : 4.39 K/uL  RBC Count : 2.51 M/uL  Hemoglobin : 7.4 g/dL  Hematocrit : 24.8 %  Platelet Count - Automated : 49 K/uL  Mean Cell Volume : 98.8 fl  Mean Cell Hemoglobin : 29.5 pg  Mean Cell Hemoglobin Concentration : 29.8 gm/dL  Auto Neutrophil # : 2.90 K/uL  Auto Lymphocyte # : 0.88 K/uL  Auto Monocyte # : 0.31 K/uL  Auto Eosinophil # : 0.13 K/uL  Auto Basophil # : 0.00 K/uL  Auto Neutrophil % : 63.0 %  Auto Lymphocyte % : 20.0 %  Auto Monocyte % : 7.0 %  Auto Eosinophil % : 3.0 %  Auto Basophil % : 0.0 %    06-17    150<H>  |  119<H>  |  45<H>  ----------------------------<  71  4.1   |  18<L>  |  5.17<H>    Ca    9.7      17 Jun 2023 07:40  Phos  4.0     06-17  Mg     2.0     06-17    TPro  5.2<L>  /  Alb  2.0<L>  /  TBili  0.5  /  DBili  x   /  AST  13  /  ALT  8<L>  /  AlkPhos  90  06-17    PT/INR - ( 17 Jun 2023 07:40 )   PT: 18.0 sec;   INR: 1.51 ratio         PTT - ( 17 Jun 2023 07:40 )  PTT:41.6 sec    [x  ]  DVT Prophylaxis: SCDs.   [ x ]  Nutrition:     RADIOLOGY & ADDITIONAL STUDIES:      < from: US Duplex Venous Upper Ext Ltd, Right (06.17.23 @ 10:14) >  ACC: [id number] EXAM:  US DPLX UPR EXT VEINS LTD RT   ORDERED BY: FABRICE WORTHY     PROCEDURE DATE:  06/17/2023          INTERPRETATION:  CLINICAL INFORMATION: Right upper extremity swelling.    COMPARISON: None available.    TECHNIQUE: Duplex sonography of the RIGHT UPPER extremity veins with   color and spectral Doppler, with and without compression.    FINDINGS:    The right internal jugular, subclavian, axillary and brachial veins are   patent and compressible where applicable.  The basilic vein (superficial   vein) is patent and without thrombus.  The cephalic vein (superficial   vein) is patent and without thrombus.    Doppler examination shows normal spontaneous and phasic flow.  Subcutaneous edema.    IMPRESSION:  No evidence of right upper extremity deep venous thrombosis.        --- End of Report ---            HANNAH MCKEON MD; Attending Radiologist  This document has been electronically signed. Jun 17 2023 10:16AM    < end of copied text >  < from: Xray Chest 1 View- PORTABLE-Routine (Xray Chest 1 View- PORTABLE-Routine in AM.) (06.14.23 @ 09:37) >  ACC: [id number] EXAM:  XR CHEST PORTABLE ROUTINE 1V   ORDERED BY: AMBROSE KUMAR     ACC: [id number] EXAM:  XR CHEST PORTABLE ROUTINE 1V   ORDERED BY: EDMUND OBLTON     PROCEDURE DATE:  06/13/2023          INTERPRETATION:  TECHNIQUE: A series of portable chest x-rays was   obtained.    COMPARISON: 6/11/2023    CLINICAL INFORMATION: Shortness of breath. Sepsis.    FINDINGS:    6/13/2023 8:27 AM:  The heart is normal in size.  There are patchy bibasilar opacities.  There are no pleural effusions.  There is no pneumothorax.    6/14/2023 8:51 AM:  The left IJ catheter was removed. There is improved aeration at the left   lung base.  IMPRESSION:    Patchy right basilar opacities, possibly atelectasis or pneumonia. The   left lung base has improved.    --- End of Report ---            LU DELEON MD; Attending Radiologist  This document has been electronically signed. Carlin 15 2023 11:24AM    < end of copied text >  < from: CT Abdomen and Pelvis No Cont (05.28.23 @ 22:34) >  ACC: [id number] EXAM:  CT ABDOMEN AND PELVIS   ORDERED BY: GERHARD REDDY     PROCEDURE DATE:  05/28/2023          INTERPRETATION:  CLINICAL INFORMATION: Abdominal pain and vomiting. Rule   out obstruction.    COMPARISON: CT of the abdomen and pelvis from 12/1/2022.    CONTRAST/COMPLICATIONS:  IV Contrast: NONE  Evaluation of the visceral organs is limited without   intravenous contrast  Oral Contrast: NONE  Complications: None reported at time of study completion    PROCEDURE:  CT of the Abdomen and Pelvis was performed.  Sagittal and coronal reformats were performed.    FINDINGS:  LOWER CHEST: Bibasilar subsegmental atelectasis. Circumferential wall   thickening of the distal esophagus.    LIVER: Within normal limits.  BILE DUCTS: Normal caliber.  GALLBLADDER: Cholecystectomy.  SPLEEN: Within normal limits.  PANCREAS: Within normal limits.  ADRENALS: Within normal limits.  KIDNEYS/URETERS: Redemonstrated moderate nonspecific bilateral   perinephric fat stranding. No hydronephrosis.    BLADDER: Collapsed around a [place]field catheter balloon. Intravesicular gas   secondary to instrumentation. Circumferential wall thickening.  REPRODUCTIVE ORGANS: Prostate within normal limits.    BOWEL: No bowel obstruction or inflammation. Scattered colonic   diverticulosis without diverticulitis. Appendix is normal.  PERITONEUM: No ascites.  VESSELS: Infrarenal IVC filter.  RETROPERITONEUM/LYMPH NODES: No lymphadenopathy.  ABDOMINAL WALL: Small bilateral fat-containing inguinal hernias.  BONES: Old right rib fractures. Severe right hip degenerative change with   remodeling of the right femoral head. Degenerative changes of the spine.    IMPRESSION:  1. No bowel obstruction or inflammation.  2. Circumferential wall thickening of the distal esophagus which could be   due to an esophagitis.  3. Circumferential urinary bladder wall thickening which could be due to   underdistention versus a cystitis. Correlate with urinalysis.      --- End of Report ---      GERHARD HAGAN MD; Attending Radiologist  This document has been electronically signed. May 28 2023 11:30PM    < end of copied text >  < from: CT Head No Cont (05.28.23 @ 22:33) >  ACC: [id number] EXAM:  CT BRAIN   ORDERED BY: GERHARD REDDY     PROCEDURE DATE:  05/28/2023          INTERPRETATION:  CLINICAL INDICATION: Altered mental status, vomiting.    TECHNIQUE:  Noncontrast CT of the head was performed.  Sagittal and coronal reformats were created.    COMPARISON STUDY: 12/4/2022    FINDINGS:    PARENCHYMA AND VENTRICLES: No acute intracranial hemorrhage, mass effect,   or midline shift. No hydrocephalus. Prominence of the sulci and   ventricles, consistent with age-related parenchymal volume loss; the   extent of ventricular and sulcal prominence appears somewhat increased   compared to 12/4/2022. Small vessel white matter changes.  EXTRA-AXIAL: No abnormal extraaxial collection.  PARANASAL SINUSES: Layering fluid in the right sphenoid sinus and   additional mild scattered mucosal thickening, as on prior.  TYMPANOMASTOID CAVITIES: Within normal limits.  ORBITS: Bilateral cataract surgery.  CALVARIUM: Within normal limits.  MISCELLANEOUS: Intracranial atherosclerosis.    IMPRESSION:  No acute intracranial hemorrhage, mass effect, or midline shift.  Paranasal sinus mucosal disease.  The extent of ventricular and sulcal prominence appears increased   compared with 12/4/2022, indeterminate clinical significance.    --- End of Report ---       UMM SIBLEY MD; Attending Radiologist  This document has been electronically signed. May 28 2023 11:22PM    < end of copied text >

## 2023-06-18 NOTE — CHART NOTE - NSCHARTNOTEFT_GEN_A_CORE
Called Germán [phone] several times and left  with call back floor numbers for pt update. will attempt a call in another time.

## 2023-06-18 NOTE — PROGRESS NOTE ADULT - ASSESSMENT
SHEYLA: Likely ATN on CKD after shock and bleeding. His Cr has worsened again. Urine Output is > 50 mL/hour.  He may need HD if his azotemia is worsening. He may be intravascular volume depleted.  - Albumin 25% IVP q 8 hours for 2 days.  - Gentle hydration.  - Hold HD for now.  - Follow up BMP.  - Maintain daley for now.        Metabolic Acidosis:  Non AG, likely Distal RTA.  Associated with relatively low K.  - Monitor BMP.    Hypernatremia:  Possible due to Volume depletion.  - Free water via  ml q 6 hours.  - Start Hypotonic fluids, preferable D5W at 50 mL/hour until Na < 144.    CKD  4  -Renal diet.  - Avoid Nephrotoxins.  - follow up BMP.    Anemia of CKD:  - Transfuse as needed for Hg < 7.  - EPO 72622 units weekly.    Hypokalemia:  Likely Distal RTA. Mg is wnl. Improved today.  - Monitor K and Mg.  - Supplement as needed.

## 2023-06-18 NOTE — PROGRESS NOTE ADULT - PROBLEM SELECTOR PLAN 4
Na 149 >>150 >>> 146  Changed from 1/2 NS to D5 @ 50 ml/hr for 10 hrs.   Noted Urine Na, Cr and Osmolality.  Goal of Na < 144  Will initiate free water flushes once NG tube in.   Dietitian consult.  Nephro dr. Olivier Na 149 >>150 >>> 146  Edematous b/l extremities   gentle hydration with D5 @ 50 ml/hr x12h  Noted Urine Na, Cr and Osmolality.  Goal of Na < 144  s/p NGT 6/17  Free water via NGT, on tube feeding    Dietitian consult.  Nephro dr. Olivier

## 2023-06-18 NOTE — PROGRESS NOTE ADULT - PROBLEM SELECTOR PLAN 6
Extended dwell to RUE removed on 6/17  R arm swollen and redness. Elevate ext   US venous doppler (-) for DVT and thrombophlebitis.

## 2023-06-18 NOTE — PROGRESS NOTE ADULT - PROBLEM SELECTOR PLAN 5
SHEYLA on CKD 4  NAGMA-s/p Bicarb, improving Bicarb 19  Anasarca, s/p Albumin x 2 days  Cr 5.27 trending up  Daily CMP and phos/Mg level  Nephro Dr Olivier following  Strict I&Os  No plan for HD at this time.   gentle hydration.   Through the weekend if HD needed, Intensivist will insert catheter.   Discussed with Nephro

## 2023-06-18 NOTE — PROGRESS NOTE ADULT - NS ATTEND AMEND GEN_ALL_CORE FT
This is a 63 year old man , from [place], [facility], ambulates with walker , chronic FC, h/o obesity, HTN, HLD, PVD, Seizures, CKD4 (base SCr 3.8), anemia, BPH, Lymphedema, hypothyroidism, HFpEF, possible COPD, decubitus ulcer (sacrum/gluteal), polyneuropathy, polyarthritis, bipolar disorder. Patient presented to ED for abdominal pain, nausea and vomiting brown emesis. Transferred to  ICU for Hypotension possibly from septic shock secondary to complicated UTI and esophagitis, f/w PsA bacteremia, completed 14d cefepime. Noted to have low H/H but no overt sign of bleeding s/p 2 unit PRBCs total (last 6/9). Possible component of hypovolemia. AMS due to toxic/metabolic encephalopathy, improved.      Plan:  - O2 supp as needed   - AMS due to encephalopathy; mentation improved. Awakens easily to voice and answering questions appropriately  - Depakote for seizure disorder  - H/H downtrending but without s/s active bleed and slow downward trend. Will transfuse 1UpRBCs given possible symptomatic anemia  - 1U plts for thrombocytopenia and pre-NGT placement 6/17, slow TF to goal  - Repeat fibrinogen in AM  - Albumin iv   - antiemetics and scheduled prokinetics  - tolerating carafate  - SHEYLA worsening but lytes acceptable and making urine; per nephrology if continuing to worsen will likely require HD in near future. To discuss with NOK/HCP  - GI eval appreciated; "repeat EGD 6/12: 95% prio esophagitis healed, inflammatory nodule in antrum, shallow diverticulum seen towards distal esophagus"  - completed cefepime x 14d, s/p course of vancomycin  - Wound care for sacral wound        Rosemary Davalos MD  Pulmonary & Critical Care  Available on Teams

## 2023-06-18 NOTE — PROGRESS NOTE ADULT - PROBLEM SELECTOR PLAN 12
SCDs for DVT prophylaxis.  Daily CBC ,CMP, coags, fibrinogen, d-dimer  F/U inflammatory markers.   HD may be needed if Cr keeps trending up, If catheter needed it will be inserted by intensivist.  s/p 1 PRBC and 1 unit pf platelet 6/17  s/p NG tube insertion 6/17  Dietitian consult.  monitor for N/V, BM. From [facility]  SCDs for DVT prophylaxis.  PT rec TG.   Daily CBC ,CMP, coags, fibrinogen, d-dimer  F/U inflammatory markers.   HD may be needed if Cr keeps trending up, If catheter needed it will be inserted by intensivist.  s/p 1 PRBC and 1 unit pf platelet 6/17  s/p NG tube insertion 6/17  Dietitian consult.  monitor for N/V, BM.

## 2023-06-18 NOTE — PROGRESS NOTE ADULT - PROBLEM SELECTOR PLAN 8
Likely related to medications cefepime and Depakote.  HIT panel negative.   Repeat DIC panel +   Platelet count remains low and blue top platelet count of 30.   f/u daily D-dimer and fibrinogen.  Daily coags  S/p 5U cryo.   Heparin d/c secondary to dropping platelet count.  F/U inflammatory markers  Keep Plt > 50 for invasive procedure. SDP tx of plt < 20 or bleeding.   If Fibrinogen < 100, give Cryo  Heme/onc QMA following.   s/p NG tube insertion 6/17, received 1 unit of platelet prior to insertion

## 2023-06-18 NOTE — PROGRESS NOTE ADULT - SUBJECTIVE AND OBJECTIVE BOX
GAYLA PEARCE  63y  Patient is a 63y old  Male who presents with a chief complaint of sepsis (18 Jun 2023 10:11)    HPI:  Seen and examined. More lethargic today. He has arm edema but not dyspneic.  He is followed for SHEYLA on CKD.    HEALTH ISSUES - PROBLEM Dx:  GIB (gastrointestinal bleeding)    Sepsis    Anemia    Hypothyroid    BPH (benign prostatic hyperplasia)    Bipolar disorder    Advance care planning    Seizure    Esophagitis    Hypokalemia    Ambulatory dysfunction    Prophylactic measure    Electrolyte depletion    Acute encephalopathy    Acute kidney injury superimposed on CKD    Ulcerative esophagitis    Decubitus ulcers    Functional quadriplegia    Moderate protein-calorie malnutrition    Encounter for palliative care    Chronic heart failure with preserved ejection fraction (HFpEF)    Sepsis with acute hypoxic respiratory failure    Protein calorie malnutrition    Palliative care encounter    SHEYLA (acute kidney injury)    Palliative care encounter    Debility    Severe protein-calorie malnutrition    Encephalopathy, unspecified    Thrombocytopenia    Hypernatremia    At risk for thrombophlebitis          MEDICATIONS  (STANDING):  albumin human 25% IVPB 50 milliLiter(s) IV Intermittent every 8 hours  albuterol    90 MICROgram(s) HFA Inhaler 2 Puff(s) Inhalation every 6 hours  bisacodyl Suppository 10 milliGRAM(s) Rectal once  buPROPion XL (24-Hour) . 150 milliGRAM(s) Oral daily  dextrose 5%. 1000 milliLiter(s) (50 mL/Hr) IV Continuous <Continuous>  dextrose 5%. 1000 milliLiter(s) (50 mL/Hr) IV Continuous <Continuous>  divalproex  milliGRAM(s) Oral two times a day  dorzolamide 2% Ophthalmic Solution 1 Drop(s) Right EYE <User Schedule>  finasteride 5 milliGRAM(s) Oral daily  nystatin Powder 1 Application(s) Topical two times a day  pantoprazole  Injectable 40 milliGRAM(s) IV Push every 24 hours  sucralfate suspension 1 Gram(s) Oral four times a day  tamsulosin 0.4 milliGRAM(s) Oral at bedtime  traZODone 50 milliGRAM(s) Oral at bedtime  vitamin A &amp; D Ointment 1 Application(s) Topical at bedtime    MEDICATIONS  (PRN):  ondansetron Injectable 4 milliGRAM(s) IV Push every 8 hours PRN Nausea and/or Vomiting  sodium chloride 0.9% lock flush 10 milliLiter(s) IV Push every 1 hour PRN Pre/post blood products, medications, blood draw, and to maintain line patency    Vital Signs Last 24 Hrs  T(C): 37.1 (18 Jun 2023 12:27), Max: 37.1 (18 Jun 2023 12:27)  T(F): 98.8 (18 Jun 2023 12:27), Max: 98.8 (18 Jun 2023 12:27)  HR: 72 (18 Jun 2023 12:27) (72 - 89)  BP: 115/53 (18 Jun 2023 12:27) (93/52 - 116/60)  BP(mean): 68 (18 Jun 2023 06:07) (60 - 68)  RR: 18 (18 Jun 2023 12:27) (17 - 20)  SpO2: 100% (18 Jun 2023 12:27) (99% - 100%)    Parameters below as of 18 Jun 2023 12:27  Patient On (Oxygen Delivery Method): nasal cannula  O2 Flow (L/min): 2    Daily     Daily     PHYSICAL EXAM:  Constitutional: He appears comfortable and not distressed. Not diaphoretic.    Neck:  The thyroid is normal. Trachea is midline.     Breasts: Normal examination.    Respiratory: The lungs are clear to auscultation. No dullness and expansion is normal.    Cardiovascular: S1 and S2 are normal. No mummurs, rubs or gallops are present.    Gastrointestinal: The abdomen is soft. No tenderness is present.     Genitourinary: The bladder is not distended. No CVA tenderness is present.    Extremities: Anasarca of arms and sacrum, no pedema edema is noted. No deformities are present.    Neurological: Tone, power and sensation are normal.     Skin: No leasions are seen  or palpated.    Lymph Nodes: No lymphadenopathy is present.                              7.9    5.30  )-----------( 46       ( 18 Jun 2023 08:25 )             26.4     06-18    146<H>  |  117<H>  |  46<H>  ----------------------------<  103<H>  3.9   |  19<L>  |  5.27<H>    Ca    10.1      18 Jun 2023 08:25  Phos  4.2     06-18  Mg     1.9     06-18    TPro  5.2<L>  /  Alb  2.3<L>  /  TBili  0.5  /  DBili  x   /  AST  11  /  ALT  9<L>  /  AlkPhos  81  06-18

## 2023-06-18 NOTE — PROGRESS NOTE ADULT - ASSESSMENT
63M, from [place], AOx3, bedbound, chronic FC, h/o obesity, HTN, HLD, PVD, Seizures, CKD (base SCr 3.8), anemia, BPH, Lymphedema, hypothyroidism, HFpEF, COPD, decubitus ulcer (sacrum/gluteal), polyneuropathy, polyarthritis, bipolar disorder, p/w abd pain a/w "brown” emesis and food intolerance x4d. Admitted for complicated UTI + suspected GIB.  Patient initially had BP of 88/62 but it improved with IVF. Patient admitted to poor oral intake for the past 4 days due to nausea and vomiting.  CT head was negative for acute changes. CT abd showed circumferential wall thickening of distal esophagus. PPI was started.  Patient complains of persistent nausea without vomiting. Reglan was started TID to improve GI motility. Clear liquid diet was started. Hemoglobin was stable around his baseline ~9. Pt had thrombocytopenia with concern for cefepime induced thrombobytopenia.  We are following daily d-dimers and dribrinogen levels with plan to transfuse 10units cryo if PLTs < 100 or if PLTs < 150 with bleeding. PLTs have been stable in 70-50's and Heparin subQ was started for DVT prophylaxis.    Patient had chronic daley from Dec 2022 and he failed TOV. UA was positive. Patient was started on Cefepime. Blood cultures and urine cultures were sent.  Wound care was consulted for sacral pressure ulcer.    Patient presented with BUN/Cr 103/4.98 (Baseline Cr 3.6~3.8). Patient received IVF and SCr has been slowly improving. Dr. Valenzuela nephrologist is on board.    Patient presented with Hypokelamia with K 3.1->2.8, likely due to vomiting and decreased PO intake. It was repleted IV and oral.    06/16: Thrombocytopenia improving. S/p Cryo. Hypernatremic with worsening SHEYLA on CKD. Likely pre-renal. Discussed with Nephrology. Albumin and light hydration. Anasarca. Extended dwell to RUE with redness and swelling around IV site. Afebrile.   S/p ABX.     06/17: Plt blue top 30 and drop in H&H. patient pale and lethargic. One unit of Platelet and 1 PRCBC ordered. Plan for NG tube insertion post platelet transfusion. Hypotensive and tachycardic overnight x 1 event. Improvement this AM. EKG noted. RUE venous doppler US negative for DVT. Hypernatremic with slight increase in SCr. Nephro following. Will start free water flushes once NG tube in. Dietitian consult for enteral feeding assessment and recommendations.     6/18: more alert and awake at baseline. s/p vomiting about 100ml with c/o nausea. s/p zofran. last BM 6/16, ordered dulcolax supp.    feeding held.      63M, from [place], AOx3, bedbound, chronic FC, h/o obesity, HTN, HLD, PVD, Seizures, CKD (base SCr 3.8), anemia, BPH, Lymphedema, hypothyroidism, HFpEF, COPD, decubitus ulcer (sacrum/gluteal), polyneuropathy, polyarthritis, bipolar disorder, p/w abd pain a/w "brown” emesis and food intolerance x4d. Admitted for complicated UTI + suspected GIB.  Patient initially had BP of 88/62 but it improved with IVF. Patient admitted to poor oral intake for the past 4 days due to nausea and vomiting.  CT head was negative for acute changes. CT abd showed circumferential wall thickening of distal esophagus. PPI was started.  Patient complains of persistent nausea without vomiting. Reglan was started TID to improve GI motility. Clear liquid diet was started. Hemoglobin was stable around his baseline ~9. Pt had thrombocytopenia with concern for cefepime induced thrombobytopenia.  We are following daily d-dimers and dribrinogen levels with plan to transfuse 10units cryo if PLTs < 100 or if PLTs < 150 with bleeding. PLTs have been stable in 70-50's and Heparin subQ was started for DVT prophylaxis.    Patient had chronic daley from Dec 2022 and he failed TOV. UA was positive. Patient was started on Cefepime. Blood cultures and urine cultures were sent.  Wound care was consulted for sacral pressure ulcer.    Patient presented with BUN/Cr 103/4.98 (Baseline Cr 3.6~3.8). Patient received IVF and SCr has been slowly improving. Dr. Valenzuela nephrologist is on board.    Patient presented with Hypokalemia with K 3.1->2.8, likely due to vomiting and decreased PO intake. It was repleted IV and oral.    06/16: Thrombocytopenia improving. S/p Cryo. Hypernatremic with worsening SHEYLA on CKD. Likely pre-renal. Discussed with Nephrology. Albumin and light hydration. Anasarca. Extended dwell to RUE with redness and swelling around IV site. Afebrile.   S/p ABX.     06/17: Plt blue top 30 and drop in H&H. patient pale and lethargic. One unit of Platelet and 1 PRCBC ordered. Plan for NG tube insertion post platelet transfusion. Hypotensive and tachycardic overnight x 1 event. Improvement this AM. EKG noted. RUE venous doppler US negative for DVT. Hypernatremic with slight increase in SCr. Nephro following. Will start free water flushes once NG tube in. Dietitian consult for enteral feeding assessment and recommendations.     6/18: more alert and awake at baseline. s/p vomiting about 100ml with c/o nausea. s/p zofran. last BM 6/16, ordered dulcolax supp.  Hypernatremia improving.   feeding held.      63M, from [place], AOx3, bedbound, chronic FC, h/o obesity, HTN, HLD, PVD, Seizures, CKD (base SCr 3.8), anemia, BPH, Lymphedema, hypothyroidism, HFpEF, COPD, decubitus ulcer (sacrum/gluteal), polyneuropathy, polyarthritis, bipolar disorder, p/w abd pain a/w "brown” emesis and food intolerance x4d. Admitted for complicated UTI + suspected GIB.  Patient initially had BP of 88/62 but it improved with IVF. Patient admitted to poor oral intake for the past 4 days due to nausea and vomiting.  CT head was negative for acute changes. CT abd showed circumferential wall thickening of distal esophagus. PPI was started.  Patient complains of persistent nausea without vomiting. Reglan was started TID to improve GI motility. Clear liquid diet was started. Hemoglobin was stable around his baseline ~9. Pt had thrombocytopenia with concern for cefepime induced thrombobytopenia.  We are following daily d-dimers and dribrinogen levels with plan to transfuse 10units cryo if PLTs < 100 or if PLTs < 150 with bleeding. PLTs have been stable in 70-50's and Heparin subQ was started for DVT prophylaxis.    Patient had chronic daley from Dec 2022 and he failed TOV. UA was positive. Patient was started on Cefepime. Blood cultures and urine cultures were sent.  Wound care was consulted for sacral pressure ulcer.    Patient presented with BUN/Cr 103/4.98 (Baseline Cr 3.6~3.8). Patient received IVF and SCr has been slowly improving. Dr. Valenzuela nephrologist is on board.    Patient presented with Hypokalemia with K 3.1->2.8, likely due to vomiting and decreased PO intake. It was repleted IV and oral.    06/16: Thrombocytopenia improving. S/p Cryo. Hypernatremic with worsening SHEYLA on CKD. Likely pre-renal. Discussed with Nephrology. Albumin and light hydration. Anasarca. Extended dwell to RUE with redness and swelling around IV site. Afebrile.   S/p ABX.     06/17: Plt blue top 30 and drop in H&H. patient pale and lethargic. One unit of Platelet and 1 PRCBC ordered. Plan for NG tube insertion post platelet transfusion. Hypotensive and tachycardic overnight x 1 event. Improvement this AM. EKG noted. RUE venous doppler US negative for DVT. Hypernatremic with slight increase in SCr. Nephro following. Will start free water flushes once NG tube in. Dietitian consult for enteral feeding assessment and recommendations.     6/18: more alert and awake at baseline. s/p vomiting about 100ml with c/o nausea. s/p zofran. last BM 6/16, ordered dulcolax supp.  Hypernatremia improving. feeding held x few hours then resumed.

## 2023-06-18 NOTE — PROGRESS NOTE ADULT - PROBLEM SELECTOR PLAN 7
Has received 2U PRBC on 6/10  No evidence of active bleeding.  transfuse for Hgb < 7.0 or symptomatic.   Heme/onc QMA following  GI dr. Cheung  Patient pale and hypotensive, lethargic 6/17.   s/p 1 PRBC 6/17 for Hgb of 7.4  H/H improving

## 2023-06-18 NOTE — PROGRESS NOTE ADULT - PROBLEM SELECTOR PLAN 2
Hx of GIB  S/P EGD X2  5/30 and 6/12. No active bleeding found  S/p 2U PRBCs on 6/10  Drop in H&H 6/17, s/p 1 U PRBC and 1 U Platelet, H/H improving  No s/s of bleeding   Monitor CBC

## 2023-06-18 NOTE — PROGRESS NOTE ADULT - PROBLEM SELECTOR PLAN 11
Chronic Viera Failed multiple TOV.  C/w Flomax and finasteride when vomiting stops.  Monitor urine output Chronic Daley Failed multiple TOV.  C/w Flomax and finasteride when vomiting stops.  Monitor urine output  c/w daley care.

## 2023-06-18 NOTE — PROGRESS NOTE ADULT - SUBJECTIVE AND OBJECTIVE BOX
GAYLA PEARCE    SCU progress note    INTERVAL HPI/OVERNIGHT EVENTS: seen at bedside, more alert and awake this morning.     Full code    Covid - 19 PCR: 6/10 negative    The 4Ms    What Matters Most: see GOC  Age appropriate Medications/Screen for High Risk Medication: Yes  Mentation: see CAM below  Mobility: defer to physical exam    The Confusion Assessment Method (CAM) Diagnostic Algorithm     1: Acute Onset or Fluctuating Course  - Is there evidence of an acute change in mental status from the patient’s baseline? Did the (abnormal) behavior  fluctuate during the day, that is, tend to come and go, or increase and decrease in severity?  [ ] YES [x ] NO     2: Inattention  - Did the patient have difficulty focusing attention, being easily distractible, or having difficulty keeping track of what was being said?  [ ] YES [ x] NO     3: Disorganized thinking  -Was the patient’s thinking disorganized or incoherent, such as rambling or irrelevant conversation, unclear or illogical flow of ideas, or unpredictable switching from subject to subject?  [ ] YES [ x] NO    4: Altered Level of consciousness?  [ ] YES [x ] NO    The diagnosis of delirium by CAM requires the presence of features 1 and 2 and either 3 or 4.    PRESSORS: [ ] YES [ x] NO    Cardiovascular:  Heart Failure  Acute   Acute on Chronic  Chronic         Pulmonary:  albuterol    90 MICROgram(s) HFA Inhaler 2 Puff(s) Inhalation every 6 hours    Hematalogic:    Other:  albumin human 25% IVPB 50 milliLiter(s) IV Intermittent every 8 hours  bisacodyl Suppository 10 milliGRAM(s) Rectal once  buPROPion XL (24-Hour) . 150 milliGRAM(s) Oral daily  dextrose 5%. 1000 milliLiter(s) IV Continuous <Continuous>  dextrose 5%. 1000 milliLiter(s) IV Continuous <Continuous>  divalproex  milliGRAM(s) Oral two times a day  dorzolamide 2% Ophthalmic Solution 1 Drop(s) Right EYE <User Schedule>  finasteride 5 milliGRAM(s) Oral daily  nystatin Powder 1 Application(s) Topical two times a day  ondansetron Injectable 4 milliGRAM(s) IV Push every 8 hours PRN  pantoprazole  Injectable 40 milliGRAM(s) IV Push every 24 hours  sodium chloride 0.9% lock flush 10 milliLiter(s) IV Push every 1 hour PRN  sucralfate suspension 1 Gram(s) Oral four times a day  tamsulosin 0.4 milliGRAM(s) Oral at bedtime  traZODone 50 milliGRAM(s) Oral at bedtime  vitamin A &amp; D Ointment 1 Application(s) Topical at bedtime    albumin human 25% IVPB 50 milliLiter(s) IV Intermittent every 8 hours  albuterol    90 MICROgram(s) HFA Inhaler 2 Puff(s) Inhalation every 6 hours  bisacodyl Suppository 10 milliGRAM(s) Rectal once  buPROPion XL (24-Hour) . 150 milliGRAM(s) Oral daily  dextrose 5%. 1000 milliLiter(s) IV Continuous <Continuous>  dextrose 5%. 1000 milliLiter(s) IV Continuous <Continuous>  divalproex  milliGRAM(s) Oral two times a day  dorzolamide 2% Ophthalmic Solution 1 Drop(s) Right EYE <User Schedule>  finasteride 5 milliGRAM(s) Oral daily  nystatin Powder 1 Application(s) Topical two times a day  ondansetron Injectable 4 milliGRAM(s) IV Push every 8 hours PRN  pantoprazole  Injectable 40 milliGRAM(s) IV Push every 24 hours  sodium chloride 0.9% lock flush 10 milliLiter(s) IV Push every 1 hour PRN  sucralfate suspension 1 Gram(s) Oral four times a day  tamsulosin 0.4 milliGRAM(s) Oral at bedtime  traZODone 50 milliGRAM(s) Oral at bedtime  vitamin A &amp; D Ointment 1 Application(s) Topical at bedtime    Drug Dosing Weight  Height (cm): 172.7 (09 Jun 2023 21:00)  Weight (kg): 84.2 (09 Jun 2023 21:00)  BMI (kg/m2): 28.2 (09 Jun 2023 21:00)  BSA (m2): 1.98 (09 Jun 2023 21:00)    CENTRAL LINE: [ ] YES [x ] NO  LOCATION:   DATE INSERTED:  REMOVE: [ ] YES [ ] NO  EXPLAIN:  Extended dwell left AC.     HATFIELD: [ x] YES [ ] NO    DATE INSERTED:  last change 6/6.   REMOVE:  [ ] YES [ ] NO  EXPLAIN:    PAST MEDICAL & SURGICAL HISTORY:  Hypothyroid  Hyperlipidemia  Ambulatory dysfunction  Anemia  History of BPH  CKD (chronic kidney disease)  Chronic obstructive pulmonary disease (COPD)  Bipolar disorder  HLD (hyperlipidemia)  BPH (benign prostatic hyperplasia)  Chronic diastolic congestive heart failure  Lymphedema  Chronic leg pain    No significant past surgical history    PHYSICAL EXAM:    GENERAL: NAD, Ill-appearing  HEAD:  Atraumatic, Normocephalic  EYES:PERRLA, conjunctiva and sclera clear  ENMT:Moist mucous membranes.   NECK: Supple  NERVOUS SYSTEM:  Lethargic & Oriented X2 (confused to month). Good concentration;   CHEST/LUNG: Diminished bilaterally; No rales, rhonchi, wheezing, or rubs. On 2 L NC. 99% Sats.   HEART: Regular rate and rhythm; No murmurs, rubs, or gallops  ABDOMEN: Soft, diffused tenderness to palpation, no guarding.  Nondistended; Bowel sounds hypoactive.   EXTREMITIES:  2+ Peripheral Pulses, No clubbing, cyanosis, Anasarca. Lower exts +2 pitting edema. Cap refill < 3 sec toes  SKIN: Sacral wound. Ecchymosis  to abdomen, arms and legs.         LABS:  CBC Full  -  ( 18 Jun 2023 08:25 )  WBC Count : 5.30 K/uL  RBC Count : 2.70 M/uL  Hemoglobin : 7.9 g/dL  Hematocrit : 26.4 %  Platelet Count - Automated : 46 K/uL  Mean Cell Volume : 97.8 fl  Mean Cell Hemoglobin : 29.3 pg  Mean Cell Hemoglobin Concentration : 29.9 gm/dL  Auto Neutrophil # : 3.68 K/uL  Auto Lymphocyte # : 0.88 K/uL  Auto Monocyte # : 0.46 K/uL  Auto Eosinophil # : 0.10 K/uL  Auto Basophil # : 0.01 K/uL  Auto Neutrophil % : 69.4 %  Auto Lymphocyte % : 16.6 %  Auto Monocyte % : 8.7 %  Auto Eosinophil % : 1.9 %  Auto Basophil % : 0.2 %    06-18    146<H>  |  117<H>  |  46<H>  ----------------------------<  103<H>  3.9   |  19<L>  |  5.27<H>    Ca    10.1      18 Jun 2023 08:25  Phos  4.2     06-18  Mg     1.9     06-18    TPro  5.2<L>  /  Alb  2.3<L>  /  TBili  0.5  /  DBili  x   /  AST  11  /  ALT  9<L>  /  AlkPhos  81  06-18    PT/INR - ( 18 Jun 2023 08:25 )   PT: 16.7 sec;   INR: 1.40 ratio         PTT - ( 18 Jun 2023 08:25 )  PTT:40.7 sec    [  ]  DVT Prophylaxis  [  ]  Nutrition, Brand, Rate         Goal Rate        Abnormal Nutritional Status -  Malnutrition   Cachexia      Morbid Obesity BMI >/=40    RADIOLOGY & ADDITIONAL STUDIES:     < from: US Duplex Venous Upper Ext Ltd, Right (06.17.23 @ 10:14) >  ACC: [id number] EXAM:  US DPLX UPR EXT VEINS LTD RT   ORDERED BY: FABRICE WORTHY   PROCEDURE DATE:  06/17/2023    INTERPRETATION:  CLINICAL INFORMATION: Right upper extremity swelling.  COMPARISON: None available.  TECHNIQUE: Duplex sonography of the RIGHT UPPER extremity veins with   color and spectral Doppler, with and without compression.  FINDINGS:  The right internal jugular, subclavian, axillary and brachial veins are   patent and compressible where applicable.  The basilic vein (superficial   vein) is patent and without thrombus.  The cephalic vein (superficial   vein) is patent and without thrombus.  Doppler examination shows normal spontaneous and phasic flow.  Subcutaneous edema.  IMPRESSION:  No evidence of right upper extremity deep venous thrombosis.  --- End of Report ---    HANNAH MCKEON MD; Attending Radiologist  This document has been electronically signed. Jun 17 2023 10:16AM    < end of copied text >    < from: Xray Chest 1 View- PORTABLE-Routine (Xray Chest 1 View- PORTABLE-Routine in AM.) (06.14.23 @ 09:37) >    ACC: [id number] EXAM:  XR CHEST PORTABLE ROUTINE 1V   ORDERED BY: AMBROSE KUMAR     ACC: [id number] EXAM:  XR CHEST PORTABLE ROUTINE 1V   ORDERED BY: EDMUND BLOTON     PROCEDURE DATE:  06/13/2023          INTERPRETATION:  TECHNIQUE: A series of portable chest x-rays was   obtained.    COMPARISON: 6/11/2023    CLINICAL INFORMATION: Shortness of breath. Sepsis.    FINDINGS:    6/13/2023 8:27 AM:  The heart is normal in size.  There are patchy bibasilar opacities.  There are no pleural effusions.  There is no pneumothorax.    6/14/2023 8:51 AM:  The left IJ catheter was removed. There is improved aeration at the left   lung base.  IMPRESSION:    Patchy right basilar opacities, possibly atelectasis or pneumonia. The   left lung base has improved.    --- End of Report ---    LU DELEON MD; Attending Radiologist  This document has been electronically signed. Carlin 15 2023 11:24AM    < end of copied text >    < from: CT Abdomen and Pelvis No Cont (05.28.23 @ 22:34) >    ACC: [id number] EXAM:  CT ABDOMEN AND PELVIS   ORDERED BY: GERHARD REDDY     PROCEDURE DATE:  05/28/2023    INTERPRETATION:  CLINICAL INFORMATION: Abdominal pain and vomiting. Rule   out obstruction.  COMPARISON: CT of the abdomen and pelvis from 12/1/2022.  CONTRAST/COMPLICATIONS:  IV Contrast: NONE  Evaluation of the visceral organs is limited without   intravenous contrast  Oral Contrast: NONE  Complications: None reported at time of study completion    PROCEDURE:  CT of the Abdomen and Pelvis was performed.  Sagittal and coronal reformats were performed.    FINDINGS:  LOWER CHEST: Bibasilar subsegmental atelectasis. Circumferential wall   thickening of the distal esophagus.  LIVER: Within normal limits.  BILE DUCTS: Normal caliber.  GALLBLADDER: Cholecystectomy.  SPLEEN: Within normal limits.  PANCREAS: Within normal limits.  ADRENALS: Within normal limits.  KIDNEYS/URETERS: Redemonstrated moderate nonspecific bilateral   perinephric fat stranding. No hydronephrosis.    BLADDER: Collapsed around a Hatfield catheter balloon. Intravesicular gas   secondary to instrumentation. Circumferential wall thickening.  REPRODUCTIVE ORGANS: Prostate within normal limits.    BOWEL: No bowel obstruction or inflammation. Scattered colonic   diverticulosis without diverticulitis. Appendix is normal.  PERITONEUM: No ascites.  VESSELS: Infrarenal IVC filter.  RETROPERITONEUM/LYMPH NODES: No lymphadenopathy.  ABDOMINAL WALL: Small bilateral fat-containing inguinal hernias.  BONES: Old right rib fractures. Severe right hip degenerative change with   remodeling of the right femoral head. Degenerative changes of the spine.    IMPRESSION:  1. No bowel obstruction or inflammation.  2. Circumferential wall thickening of the distal esophagus which could be   due to an esophagitis.  3. Circumferential urinary bladder wall thickening which could be due to   underdistention versus a cystitis. Correlate with urinalysis.  --- End of Report ---            GERHARD HAGAN MD; Attending Radiologist  This document has been electronically signed. May 28 2023 11:30PM    < end of copied text >  Goals of Care Discussion with Family/Proxy/Other   - see note from/family update note   GAYLA PEARCE    SCU progress note    INTERVAL HPI/OVERNIGHT EVENTS: seen at bedside, more alert and awake this morning.     Full code    Covid - 19 PCR: 6/10 negative    The 4Ms    What Matters Most: see GOC  Age appropriate Medications/Screen for High Risk Medication: Yes  Mentation: see CAM below  Mobility: defer to physical exam    The Confusion Assessment Method (CAM) Diagnostic Algorithm     1: Acute Onset or Fluctuating Course  - Is there evidence of an acute change in mental status from the patient’s baseline? Did the (abnormal) behavior  fluctuate during the day, that is, tend to come and go, or increase and decrease in severity?  [ ] YES [x ] NO     2: Inattention  - Did the patient have difficulty focusing attention, being easily distractible, or having difficulty keeping track of what was being said?  [ ] YES [ x] NO     3: Disorganized thinking  -Was the patient’s thinking disorganized or incoherent, such as rambling or irrelevant conversation, unclear or illogical flow of ideas, or unpredictable switching from subject to subject?  [ ] YES [ x] NO    4: Altered Level of consciousness?  [ ] YES [x ] NO    The diagnosis of delirium by CAM requires the presence of features 1 and 2 and either 3 or 4.    PRESSORS: [ ] YES [ x] NO    Cardiovascular:  Heart Failure  Acute   Acute on Chronic  Chronic         Pulmonary:  albuterol    90 MICROgram(s) HFA Inhaler 2 Puff(s) Inhalation every 6 hours    Hematalogic:    Other:  albumin human 25% IVPB 50 milliLiter(s) IV Intermittent every 8 hours  bisacodyl Suppository 10 milliGRAM(s) Rectal once  buPROPion XL (24-Hour) . 150 milliGRAM(s) Oral daily  dextrose 5%. 1000 milliLiter(s) IV Continuous <Continuous>  dextrose 5%. 1000 milliLiter(s) IV Continuous <Continuous>  divalproex  milliGRAM(s) Oral two times a day  dorzolamide 2% Ophthalmic Solution 1 Drop(s) Right EYE <User Schedule>  finasteride 5 milliGRAM(s) Oral daily  nystatin Powder 1 Application(s) Topical two times a day  ondansetron Injectable 4 milliGRAM(s) IV Push every 8 hours PRN  pantoprazole  Injectable 40 milliGRAM(s) IV Push every 24 hours  sodium chloride 0.9% lock flush 10 milliLiter(s) IV Push every 1 hour PRN  sucralfate suspension 1 Gram(s) Oral four times a day  tamsulosin 0.4 milliGRAM(s) Oral at bedtime  traZODone 50 milliGRAM(s) Oral at bedtime  vitamin A &amp; D Ointment 1 Application(s) Topical at bedtime    albumin human 25% IVPB 50 milliLiter(s) IV Intermittent every 8 hours  albuterol    90 MICROgram(s) HFA Inhaler 2 Puff(s) Inhalation every 6 hours  bisacodyl Suppository 10 milliGRAM(s) Rectal once  buPROPion XL (24-Hour) . 150 milliGRAM(s) Oral daily  dextrose 5%. 1000 milliLiter(s) IV Continuous <Continuous>  dextrose 5%. 1000 milliLiter(s) IV Continuous <Continuous>  divalproex  milliGRAM(s) Oral two times a day  dorzolamide 2% Ophthalmic Solution 1 Drop(s) Right EYE <User Schedule>  finasteride 5 milliGRAM(s) Oral daily  nystatin Powder 1 Application(s) Topical two times a day  ondansetron Injectable 4 milliGRAM(s) IV Push every 8 hours PRN  pantoprazole  Injectable 40 milliGRAM(s) IV Push every 24 hours  sodium chloride 0.9% lock flush 10 milliLiter(s) IV Push every 1 hour PRN  sucralfate suspension 1 Gram(s) Oral four times a day  tamsulosin 0.4 milliGRAM(s) Oral at bedtime  traZODone 50 milliGRAM(s) Oral at bedtime  vitamin A &amp; D Ointment 1 Application(s) Topical at bedtime    Drug Dosing Weight  Height (cm): 172.7 (09 Jun 2023 21:00)  Weight (kg): 84.2 (09 Jun 2023 21:00)  BMI (kg/m2): 28.2 (09 Jun 2023 21:00)  BSA (m2): 1.98 (09 Jun 2023 21:00)    CENTRAL LINE: [ ] YES [x ] NO  LOCATION:   DATE INSERTED:  REMOVE: [ ] YES [ ] NO  EXPLAIN:    HATFIELD: [ x] YES [ ] NO    DATE INSERTED:  last change 6/6.   REMOVE:  [ ] YES [ ] NO  EXPLAIN:    PAST MEDICAL & SURGICAL HISTORY:  Hypothyroid  Hyperlipidemia  Ambulatory dysfunction  Anemia  History of BPH  CKD (chronic kidney disease)  Chronic obstructive pulmonary disease (COPD)  Bipolar disorder  HLD (hyperlipidemia)  BPH (benign prostatic hyperplasia)  Chronic diastolic congestive heart failure  Lymphedema  Chronic leg pain    No significant past surgical history    PHYSICAL EXAM:    GENERAL: NAD, Ill-appearing  HEAD:  Atraumatic, Normocephalic  EYES:PERRLA, conjunctiva and sclera clear  ENMT:Moist mucous membranes.   NECK: Supple  NERVOUS SYSTEM:  Alert and Oriented X2 (confused to month). following commands  CHEST/LUNG: Diminished bilaterally; No rales, rhonchi, wheezing, or rubs. On 2 L NC. 99% Sats.   HEART: Regular rate and rhythm; No murmurs, rubs, or gallops  ABDOMEN: Soft, diffused tenderness to palpation, no guarding.  Nondistended; Bowel sounds hypoactive.   EXTREMITIES:  2+ Peripheral Pulses, No clubbing, cyanosis, Anasarca. right arm edema, Lower exts +2 pitting edema. Cap refill < 3 sec toes  SKIN: Sacral wound. Ecchymosis  to abdomen, arms and legs, mid neck.         LABS:  CBC Full  -  ( 18 Jun 2023 08:25 )  WBC Count : 5.30 K/uL  RBC Count : 2.70 M/uL  Hemoglobin : 7.9 g/dL  Hematocrit : 26.4 %  Platelet Count - Automated : 46 K/uL  Mean Cell Volume : 97.8 fl  Mean Cell Hemoglobin : 29.3 pg  Mean Cell Hemoglobin Concentration : 29.9 gm/dL  Auto Neutrophil # : 3.68 K/uL  Auto Lymphocyte # : 0.88 K/uL  Auto Monocyte # : 0.46 K/uL  Auto Eosinophil # : 0.10 K/uL  Auto Basophil # : 0.01 K/uL  Auto Neutrophil % : 69.4 %  Auto Lymphocyte % : 16.6 %  Auto Monocyte % : 8.7 %  Auto Eosinophil % : 1.9 %  Auto Basophil % : 0.2 %    06-18    146<H>  |  117<H>  |  46<H>  ----------------------------<  103<H>  3.9   |  19<L>  |  5.27<H>    Ca    10.1      18 Jun 2023 08:25  Phos  4.2     06-18  Mg     1.9     06-18    TPro  5.2<L>  /  Alb  2.3<L>  /  TBili  0.5  /  DBili  x   /  AST  11  /  ALT  9<L>  /  AlkPhos  81  06-18    PT/INR - ( 18 Jun 2023 08:25 )   PT: 16.7 sec;   INR: 1.40 ratio         PTT - ( 18 Jun 2023 08:25 )  PTT:40.7 sec    [  ]  DVT Prophylaxis  [  ]  Nutrition, Brand, Rate         Goal Rate        Abnormal Nutritional Status -  Malnutrition   Cachexia      Morbid Obesity BMI >/=40    RADIOLOGY & ADDITIONAL STUDIES:     < from: US Duplex Venous Upper Ext Ltd, Right (06.17.23 @ 10:14) >  ACC: [id number] EXAM:  US DPLX UPR EXT VEINS LTD RT   ORDERED BY: FABRICE WORTHY   PROCEDURE DATE:  06/17/2023    INTERPRETATION:  CLINICAL INFORMATION: Right upper extremity swelling.  COMPARISON: None available.  TECHNIQUE: Duplex sonography of the RIGHT UPPER extremity veins with   color and spectral Doppler, with and without compression.  FINDINGS:  The right internal jugular, subclavian, axillary and brachial veins are   patent and compressible where applicable.  The basilic vein (superficial   vein) is patent and without thrombus.  The cephalic vein (superficial   vein) is patent and without thrombus.  Doppler examination shows normal spontaneous and phasic flow.  Subcutaneous edema.  IMPRESSION:  No evidence of right upper extremity deep venous thrombosis.  --- End of Report ---    HANNAH MCKEON MD; Attending Radiologist  This document has been electronically signed. Jun 17 2023 10:16AM    < end of copied text >    < from: Xray Chest 1 View- PORTABLE-Routine (Xray Chest 1 View- PORTABLE-Routine in AM.) (06.14.23 @ 09:37) >    ACC: [id number] EXAM:  XR CHEST PORTABLE ROUTINE 1V   ORDERED BY: AMBROSE KUMAR     ACC: [id number] EXAM:  XR CHEST PORTABLE ROUTINE 1V   ORDERED BY: EDMUND BOLTON     PROCEDURE DATE:  06/13/2023          INTERPRETATION:  TECHNIQUE: A series of portable chest x-rays was   obtained.    COMPARISON: 6/11/2023    CLINICAL INFORMATION: Shortness of breath. Sepsis.    FINDINGS:    6/13/2023 8:27 AM:  The heart is normal in size.  There are patchy bibasilar opacities.  There are no pleural effusions.  There is no pneumothorax.    6/14/2023 8:51 AM:  The left IJ catheter was removed. There is improved aeration at the left   lung base.  IMPRESSION:    Patchy right basilar opacities, possibly atelectasis or pneumonia. The   left lung base has improved.    --- End of Report ---    LU DELEON MD; Attending Radiologist  This document has been electronically signed. Carlin 15 2023 11:24AM    < end of copied text >    < from: CT Abdomen and Pelvis No Cont (05.28.23 @ 22:34) >    ACC: [id number] EXAM:  CT ABDOMEN AND PELVIS   ORDERED BY: GERHARD REDDY     PROCEDURE DATE:  05/28/2023    INTERPRETATION:  CLINICAL INFORMATION: Abdominal pain and vomiting. Rule   out obstruction.  COMPARISON: CT of the abdomen and pelvis from 12/1/2022.  CONTRAST/COMPLICATIONS:  IV Contrast: NONE  Evaluation of the visceral organs is limited without   intravenous contrast  Oral Contrast: NONE  Complications: None reported at time of study completion    PROCEDURE:  CT of the Abdomen and Pelvis was performed.  Sagittal and coronal reformats were performed.    FINDINGS:  LOWER CHEST: Bibasilar subsegmental atelectasis. Circumferential wall   thickening of the distal esophagus.  LIVER: Within normal limits.  BILE DUCTS: Normal caliber.  GALLBLADDER: Cholecystectomy.  SPLEEN: Within normal limits.  PANCREAS: Within normal limits.  ADRENALS: Within normal limits.  KIDNEYS/URETERS: Redemonstrated moderate nonspecific bilateral   perinephric fat stranding. No hydronephrosis.    BLADDER: Collapsed around a Hatfield catheter balloon. Intravesicular gas   secondary to instrumentation. Circumferential wall thickening.  REPRODUCTIVE ORGANS: Prostate within normal limits.    BOWEL: No bowel obstruction or inflammation. Scattered colonic   diverticulosis without diverticulitis. Appendix is normal.  PERITONEUM: No ascites.  VESSELS: Infrarenal IVC filter.  RETROPERITONEUM/LYMPH NODES: No lymphadenopathy.  ABDOMINAL WALL: Small bilateral fat-containing inguinal hernias.  BONES: Old right rib fractures. Severe right hip degenerative change with   remodeling of the right femoral head. Degenerative changes of the spine.    IMPRESSION:  1. No bowel obstruction or inflammation.  2. Circumferential wall thickening of the distal esophagus which could be   due to an esophagitis.  3. Circumferential urinary bladder wall thickening which could be due to   underdistention versus a cystitis. Correlate with urinalysis.  --- End of Report ---            GERHARD HAGAN MD; Attending Radiologist  This document has been electronically signed. May 28 2023 11:30PM    < end of copied text >  Goals of Care Discussion with Family/Proxy/Other   - see note from/family update note

## 2023-06-18 NOTE — PROGRESS NOTE ADULT - SUBJECTIVE AND OBJECTIVE BOX
Time of Visit:  Patient seen and examined.     MEDICATIONS  (STANDING):  albuterol    90 MICROgram(s) HFA Inhaler 2 Puff(s) Inhalation every 6 hours  bisacodyl Suppository 10 milliGRAM(s) Rectal once  buPROPion XL (24-Hour) . 150 milliGRAM(s) Oral daily  dextrose 5%. 1000 milliLiter(s) (50 mL/Hr) IV Continuous <Continuous>  dextrose 5%. 1000 milliLiter(s) (50 mL/Hr) IV Continuous <Continuous>  divalproex  milliGRAM(s) Oral two times a day  dorzolamide 2% Ophthalmic Solution 1 Drop(s) Right EYE <User Schedule>  finasteride 5 milliGRAM(s) Oral daily  nystatin Powder 1 Application(s) Topical two times a day  pantoprazole  Injectable 40 milliGRAM(s) IV Push every 24 hours  sucralfate suspension 1 Gram(s) Oral four times a day  tamsulosin 0.4 milliGRAM(s) Oral at bedtime  traZODone 50 milliGRAM(s) Oral at bedtime  vitamin A &amp; D Ointment 1 Application(s) Topical at bedtime      MEDICATIONS  (PRN):  ondansetron Injectable 4 milliGRAM(s) IV Push every 8 hours PRN Nausea and/or Vomiting  sodium chloride 0.9% lock flush 10 milliLiter(s) IV Push every 1 hour PRN Pre/post blood products, medications, blood draw, and to maintain line patency       Medications up to date at time of exam.      PHYSICAL EXAMINATION:  Patient has no new complaints.  GENERAL: The patient is a well-developed, well-nourished, in no apparent distress.     Vital Signs Last 24 Hrs  T(C): 37.1 (18 Jun 2023 12:27), Max: 37.1 (18 Jun 2023 12:27)  T(F): 98.8 (18 Jun 2023 12:27), Max: 98.8 (18 Jun 2023 12:27)  HR: 72 (18 Jun 2023 12:27) (72 - 87)  BP: 115/53 (18 Jun 2023 12:27) (93/52 - 115/53)  BP(mean): 68 (18 Jun 2023 06:07) (60 - 68)  RR: 18 (18 Jun 2023 12:27) (17 - 20)  SpO2: 100% (18 Jun 2023 12:27) (99% - 100%)    Parameters below as of 18 Jun 2023 12:27  Patient On (Oxygen Delivery Method): nasal cannula  O2 Flow (L/min): 2     (if applicable)    Chest Tube (if applicable)    HEENT: Head is normocephalic and atraumatic. Extraocular muscles are intact. Mucous membranes are moist.     NECK: Supple, no palpable adenopathy.    LUNGS: Clear to auscultation, no wheezing, rales, or rhonchi.    HEART: Regular rate and rhythm without murmur.    ABDOMEN: Soft, nontender, and nondistended.  No hepatosplenomegaly is noted.    : No painful voiding, no pelvic pain    EXTREMITIES: Without any cyanosis, clubbing, rash, lesions or edema.    NEUROLOGIC: Awake, alert, oriented, grossly intact    SKIN: Warm, dry, good turgor.      LABS:                        7.9    5.30  )-----------( 46       ( 18 Jun 2023 08:25 )             26.4     06-18    146<H>  |  117<H>  |  46<H>  ----------------------------<  103<H>  3.9   |  19<L>  |  5.27<H>    Ca    10.1      18 Jun 2023 08:25  Phos  4.2     06-18  Mg     1.9     06-18    TPro  5.2<L>  /  Alb  2.3<L>  /  TBili  0.5  /  DBili  x   /  AST  11  /  ALT  9<L>  /  AlkPhos  81  06-18    PT/INR - ( 18 Jun 2023 08:25 )   PT: 16.7 sec;   INR: 1.40 ratio         PTT - ( 18 Jun 2023 08:25 )  PTT:40.7 sec          D-Dimer Assay, Quantitative: <150 ng/mL DDU (06-18-23 @ 08:25)            MICROBIOLOGY: (if applicable)    RADIOLOGY & ADDITIONAL STUDIES:  EKG:   CXR:  ECHO:    IMPRESSION: 63y Male PAST MEDICAL & SURGICAL HISTORY:  Hypothyroid      Hyperlipidemia      Ambulatory dysfunction      Anemia      History of BPH      CKD (chronic kidney disease)      Chronic obstructive pulmonary disease (COPD)      Bipolar disorder      HLD (hyperlipidemia)      BPH (benign prostatic hyperplasia)      Chronic diastolic congestive heart failure      Lymphedema      Chronic leg pain      No significant past surgical history       p/w           RECOMMENDATIONS:   Time of Visit:  Patient seen and examined.     MEDICATIONS  (STANDING):  albuterol    90 MICROgram(s) HFA Inhaler 2 Puff(s) Inhalation every 6 hours  bisacodyl Suppository 10 milliGRAM(s) Rectal once  buPROPion XL (24-Hour) . 150 milliGRAM(s) Oral daily  dextrose 5%. 1000 milliLiter(s) (50 mL/Hr) IV Continuous <Continuous>  dextrose 5%. 1000 milliLiter(s) (50 mL/Hr) IV Continuous <Continuous>  divalproex  milliGRAM(s) Oral two times a day  dorzolamide 2% Ophthalmic Solution 1 Drop(s) Right EYE <User Schedule>  finasteride 5 milliGRAM(s) Oral daily  nystatin Powder 1 Application(s) Topical two times a day  pantoprazole  Injectable 40 milliGRAM(s) IV Push every 24 hours  sucralfate suspension 1 Gram(s) Oral four times a day  tamsulosin 0.4 milliGRAM(s) Oral at bedtime  traZODone 50 milliGRAM(s) Oral at bedtime  vitamin A &amp; D Ointment 1 Application(s) Topical at bedtime      MEDICATIONS  (PRN):  ondansetron Injectable 4 milliGRAM(s) IV Push every 8 hours PRN Nausea and/or Vomiting  sodium chloride 0.9% lock flush 10 milliLiter(s) IV Push every 1 hour PRN Pre/post blood products, medications, blood draw, and to maintain line patency       Medications up to date at time of exam.      PHYSICAL EXAMINATION:  Patient has no new complaints.  GENERAL: The patient is a well-developed, well-nourished, in no apparent distress.     Vital Signs Last 24 Hrs  T(C): 37.1 (18 Jun 2023 12:27), Max: 37.1 (18 Jun 2023 12:27)  T(F): 98.8 (18 Jun 2023 12:27), Max: 98.8 (18 Jun 2023 12:27)  HR: 72 (18 Jun 2023 12:27) (72 - 87)  BP: 115/53 (18 Jun 2023 12:27) (93/52 - 115/53)  BP(mean): 68 (18 Jun 2023 06:07) (60 - 68)  RR: 18 (18 Jun 2023 12:27) (17 - 20)  SpO2: 100% (18 Jun 2023 12:27) (99% - 100%)    Parameters below as of 18 Jun 2023 12:27  Patient On (Oxygen Delivery Method): nasal cannula  O2 Flow (L/min): 2     (if applicable)    Chest Tube (if applicable)    HEENT: Head is normocephalic and atraumatic. Extraocular muscles are intact. Mucous membranes are moist.     NECK: Supple, no palpable adenopathy.    LUNGS: Clear to auscultation, no wheezing, rales, or rhonchi.    HEART: Regular rate and rhythm without murmur.    ABDOMEN: Soft, nontender, and nondistended.  No hepatosplenomegaly is noted.    : No painful voiding, no pelvic pain    EXTREMITIES: Without any cyanosis, clubbing, rash, lesions or edema.    NEUROLOGIC: Awake, alert, oriented, grossly intact    SKIN: Warm, dry, good turgor.      LABS:                        7.9    5.30  )-----------( 46       ( 18 Jun 2023 08:25 )             26.4     06-18    146<H>  |  117<H>  |  46<H>  ----------------------------<  103<H>  3.9   |  19<L>  |  5.27<H>    Ca    10.1      18 Jun 2023 08:25  Phos  4.2     06-18  Mg     1.9     06-18    TPro  5.2<L>  /  Alb  2.3<L>  /  TBili  0.5  /  DBili  x   /  AST  11  /  ALT  9<L>  /  AlkPhos  81  06-18    PT/INR - ( 18 Jun 2023 08:25 )   PT: 16.7 sec;   INR: 1.40 ratio         PTT - ( 18 Jun 2023 08:25 )  PTT:40.7 sec          D-Dimer Assay, Quantitative: <150 ng/mL DDU (06-18-23 @ 08:25)            MICROBIOLOGY: (if applicable)    RADIOLOGY & ADDITIONAL STUDIES:  EKG:   CXR:  ECHO:    IMPRESSION: 63y Male PAST MEDICAL & SURGICAL HISTORY:  Hypothyroid      Hyperlipidemia      Ambulatory dysfunction      Anemia      History of BPH      CKD (chronic kidney disease)      Chronic obstructive pulmonary disease (COPD)      Bipolar disorder      HLD (hyperlipidemia)      BPH (benign prostatic hyperplasia)      Chronic diastolic congestive heart failure      Lymphedema      Chronic leg pain      No significant past surgical history       p/w         IMP: This is a 63 year old man , from [place], [facility], ambulates with walker , chronic FC, h/o obesity, HTN, HLD, PVD, Seizures, CKD4 (base SCr 3.8), anemia, BPH, Lymphedema, hypothyroidism, HFpEF, possible COPD, decubitus ulcer (sacrum/gluteal), polyneuropathy, polyarthritis, bipolar disorder. Patient presented to ED for abdominal pain, nausea and vomiting brown emesis. Transferred to  ICU for Hypotension possibly from septic shock secondary to complicated UTI and esophagitis, f/w PsA bacteremia, completed 14d cefepime. Noted to have low H/H but no overt sign of bleeding s/p 2 unit PRBCs total (last 6/9). Possible component of hypovolemia. AMS due to toxic/metabolic encephalopathy. Mental status is back to baseline .. AAOx 3 . PLT is down trending      Plan:  - O2 supp as needed   - No sedation  - Mental status back to baseline   - Cryo as per Hematology  and monitor for sign of bleeding   - SHEYLA plateau   - S/P blood product transfusion   - Monitor CBC daily, H/H now stable; if downtrending will need CT chest abd and pelvis   - Dose vanco as per serum level for now  - Wound care for sacral wound  - Viera cath  - GI eval appreciated; "repeat EGD 6/12: 95% prio esophagitis healed, inflammatory nodule in antrum, shallow diverticulum seen towards distal esophagus"

## 2023-06-18 NOTE — PROGRESS NOTE ADULT - PROBLEM SELECTOR PLAN 3
S/P EGD  5/30 and 6/13  +Esophagitis  Zofran 4mg prn  Sucralfate 1Gm 4 times daily  Pantoprazole 40mg daily.  Gentle hydration  GI dr. Cheung  NGT in place 6/17.   s/p N/V with formula contents 100ml 6/17, Feeding held in AM then restarted. last BM 6/16. give dulcolax supp.

## 2023-06-18 NOTE — PROGRESS NOTE ADULT - PROBLEM SELECTOR PLAN 1
Secondary to pseudomonas bacteremia and UTI  Blood culture from 6/11 Negative.  Antibiotics completed.  Continue to monitor off antibiotics.  Afebrile. BP soft  on gently hydration. Secondary to pseudomonas bacteremia and UTI  Blood culture from 6/11 Negative.  Antibiotics completed.  Continue to monitor off antibiotics.  Afebrile.   BP remains low 90s-110s.  on gentle hydration.

## 2023-06-19 NOTE — PROGRESS NOTE ADULT - PROBLEM SELECTOR PLAN 8
Likely related to medications cefepime and Depakote.  HIT panel negative.   Repeat DIC panel +   Platelet count remains low and blue top platelet count of 30.   f/u daily D-dimer and fibrinogen.  Daily coags  S/p 5U cryo.   Heparin d/c secondary to dropping platelet count.  F/U inflammatory markers  Keep Plt > 50 for invasive procedure. SDP tx of plt < 20 or bleeding.   If Fibrinogen < 100, give Cryo  Heme/onc QMA following.   s/p NG tube insertion 6/17, received 1 unit of platelet prior to insertion.

## 2023-06-19 NOTE — PROGRESS NOTE ADULT - NS ATTEND AMEND GEN_ALL_CORE FT
Problem/Plan - 1:  ·  Problem: Sepsis.   ·  Plan: Secondary to pseudomonas bacteremia and UTI  Blood culture from 6/11 Negative.  Antibiotics completed.  Continue to monitor off antibiotics.  Afebrile.   on gentle hydration.     Problem/Plan - 2:  ·  Problem: GIB (gastrointestinal bleeding).   ·  Plan: Hx of GIB  S/P EGD X2  5/30 and 6/12. No active bleeding found  S/p 2U PRBCs on 6/10  Drop in H&H 6/17, s/p 1 U PRBC and 1 U Platelet, H/H improving  Drop in H&H 06/18 7.2/23.9   No s/s of bleeding   Monitor CBC.     Problem/Plan - 3:  ·  Problem: Esophagitis.   ·  Plan: S/P EGD  5/30 and 6/13  +Esophagitis  Zofran 4mg prn  Sucralfate 1Gm 4 times daily  Pantoprazole 40mg daily.  Gentle hydration  GI dr. Cheung  NGT in place 6/17 to right nare.   s/p N/V with formula contents 100ml 6/17, Feeding held in AM then restarted. last BM 6/16. give dulcolax supp.     Problem/Plan - 4:  ·  Problem: Hypernatremia.   ·  Plan: Na 149 >>150 >>> 146>>145  Edematous b/l extremities   gentle hydration with D5 @ 50 ml/hr x12h  Noted Urine Na, Cr and Osmolality.  Goal of Na < 144  s/p NGT 6/17  Free water via NGT, on tube feeding    Dietitian consult.  Nephro dr. Olivier.     Problem/Plan - 5:  ·  Problem: Acute kidney injury superimposed on CKD.   ·  Plan: SHEYLA on CKD 4  NAGMA-s/p Bicarb, improving Bicarb 19  Anasarca, s/p Albumin x 2 days  Cr 5.27>>5.17   Daily CMP and phos/Mg level  Nephcolleen Olivier following  Strict I&Os  gentle hydration.   Discussed with Nephro.

## 2023-06-19 NOTE — PROGRESS NOTE ADULT - PROBLEM SELECTOR PLAN 4
Na 149 >>150 >>> 146>>145  Edematous b/l extremities   gentle hydration with D5 @ 50 ml/hr x12h  Noted Urine Na, Cr and Osmolality.  Goal of Na < 144  s/p NGT 6/17  Free water via NGT, on tube feeding    Dietitian consult.  Nephro dr. Olivier Na 149 >>150 >>> 146>>145  Edematous b/l extremities   IVF on hold.   Noted Urine Na, Cr and Osmolality.  Goal of Na < 144  s/p NGT 6/17  Free water via NGT, on tube feeding (on hold)  Dietitian consulted  Nephro dr. Olivier

## 2023-06-19 NOTE — PROGRESS NOTE ADULT - PROBLEM SELECTOR PLAN 5
SHEYLA on CKD 4  NAGMA-s/p Bicarb, improving Bicarb 19  Anasarca, s/p Albumin x 2 days  Cr 5.27>>5.17   Daily CMP and phos/Mg level  Nephro Dr Olivier following  Strict I&Os  gentle hydration.   Discussed with Nephro SHEYLA on CKD 4  NAGMA-s/p Bicarb, improving Bicarb 19  Anasarca, s/p Albumin x 2 days.   Cr 5.27>>5.17   Daily CMP and phos/Mg level  Nephro Dr Olivier following  Strict I&Os  Discussed with Nephro and no plans for HD at this time.   Discussed CXR results however due to soft BP, will hold off on Lasix  Per Nephro, if BP normalizes patient would benefit from Lasix 80 mg instead of 40 mg.

## 2023-06-19 NOTE — PROGRESS NOTE ADULT - PROBLEM SELECTOR PLAN 1
Patient remains alert and oriented x 2.  Less confused/lethargic but continues to have limited/poor insight into his medical condition.    Consult appreciated--patient retains the capacity of choosing his code status, as he has been consistent with wanting everything done to save his life.    Patient remains Full Code  Further management per AI/primary medical team

## 2023-06-19 NOTE — PROGRESS NOTE ADULT - PROBLEM SELECTOR PLAN 6
Multiple stage I/II decubitus ulcers of sacrum, bilateral heels, and bilateral buttocks/gluteal area (including stage II of R gluteus muscle), likely due to functional quadriplegia in the setting of multiple medical comorbidities.    Continue present wound care recommendations  Encourage PT as tolerated when medically stable  Offloading of heels  Frequent turning and repositioning while in bed.

## 2023-06-19 NOTE — BH CONSULTATION LIAISON PROGRESS NOTE - NSBHASSESSMENTFT_PSY_ALL_CORE
64 y/o M with a hx of CKD, COPD, CHF, BPH, PVD, lymphedema, anemia, decubitus ulcers, poly-neuropathy, hypothyroidism, and Bipolar disorder, who is followed due to capacity to choose a code status. It is unclear how robust his understanding regarding his current conditions and prognosis is, however, he clearly states that he wants everything done to save his life. He is not suicidal, homicidal or psychotic.    -Although the extent of his understanding regarding his current illnesses and prognosis is vague, at the moment, he clearly states his wish for everything to be done to prolong his life, hence it should be assumed that he retains the capacity of choosing his code status  -Given continued thrombocytopenia, would d/c Depakote  -Consider Lurasidone 20 mg PO BID w/feedings  -Cont Bupropion  mg PO daily, and Trazodone 50 mg PO HS  -PRN: Haldol 1mg IM q 6 hrs PRN for agitation  -Monitor his QTc  -Delirium precautions  -Avoid use of benzodiazepines and anticholinergics  -Case discussed with the primary team  -Will follow as needed

## 2023-06-19 NOTE — PROGRESS NOTE ADULT - ASSESSMENT
1. SHEYLA due to ATN  -Scr improved slowly to 5.12mg/dL with IVFs. Will monitor next few days for possible renal recovery. No urgent need of dialysis at present.  -Discussed with patient in detail (AAOx3) and understands about dialysis and would like to proceed with dialysis when necessary.   -Adjust meds to eGFR and avoid IV Gadolinium contrast,NSAIDs, and phosphate enema.  -Monitor I/O's daily.   -Monitor SMA daily.  2. CKD stage 4 most likely due to ischemic nephropathy  -baseline scr around 3.7mg/dL in Dec after ATN with renal recovery.  -Keep patient euvolemic and renal diet  -Avoid Nephrotoxic Meds/ Agents such as (NSAIDs, IV contrast, Aminoglycosides such as gentamicin, -Gadolinium contrast, Phosphate containing enemas, etc..)  -Adjust Medications according to eGFR  3. h/o Hypotension:  -bp is stable   -monitor BP.  4. Mineral Bone Disease:  -phos is okay without binders; low phos and phos supplemented.  -PTH is low and no need vitamin d analog.  5. Anemia of CKD  -start epo 10,000 tiw.  -F/u CBC daily  -transfuse if HB < 7.0.  6. Hypokalemia:  -give kcl 20meq once.  -monitor K.   7. Acidosis  -off sodium bicarbonate 650mg tid.  -monitor CO2.   8. Hypernatremia due to lack of free water intake.  -Na is improving.   -continue D5W at 50cc/hr  -on free water 250cc q6hrs.  9. Thrombocytopenia:  -workup in progress  -plan as per heme/onco     Discussed with patient in detail regarding the renal plan and care  Discussed the assessment and plan with Primary Team/Nurse

## 2023-06-19 NOTE — CHART NOTE - NSCHARTNOTEFT_GEN_A_CORE
Assessment:     Factors impacting intake: [ ] none [ ] nausea  [ ] vomiting [ ] diarrhea [ ] constipation  [ ]chewing problems [ ] swallowing issues  [ ] other:     Diet Presciption: Diet, NPO with Tube Feed:   Tube Feeding Modality: Nasogastric  Nepro with Carb Steady  Continuous  Until Goal Tube Feed Rate (mL per Hour): 10  Tube Feed Duration (in Hours): 24  Tube Feed Start Time: 17:00  Free Water Flush  Pump   Rate (mL per Hour): 215   Frequency: Every 8 Hours  Free Water Flush Instructions:  Per Nephro (23 @ 15:09)    Intake:     Daily Weight in k.8 (2023 08:00)  Weight in k.3 (2023 07:30)    % Weight Change    Pertinent Medications: MEDICATIONS  (STANDING):  albuterol    90 MICROgram(s) HFA Inhaler 2 Puff(s) Inhalation every 6 hours  buPROPion XL (24-Hour) . 150 milliGRAM(s) Oral daily  dextrose 5%. 1000 milliLiter(s) (50 mL/Hr) IV Continuous <Continuous>  dextrose 5%. 1000 milliLiter(s) (50 mL/Hr) IV Continuous <Continuous>  divalproex  milliGRAM(s) Oral two times a day  dorzolamide 2% Ophthalmic Solution 1 Drop(s) Right EYE <User Schedule>  finasteride 5 milliGRAM(s) Oral daily  nystatin Powder 1 Application(s) Topical two times a day  pantoprazole  Injectable 40 milliGRAM(s) IV Push every 24 hours  sucralfate suspension 1 Gram(s) Oral four times a day  tamsulosin 0.4 milliGRAM(s) Oral at bedtime  traZODone 50 milliGRAM(s) Oral at bedtime  vitamin A &amp; D Ointment 1 Application(s) Topical at bedtime    MEDICATIONS  (PRN):  ondansetron Injectable 4 milliGRAM(s) IV Push every 8 hours PRN Nausea and/or Vomiting  sodium chloride 0.9% lock flush 10 milliLiter(s) IV Push every 1 hour PRN Pre/post blood products, medications, blood draw, and to maintain line patency    Pertinent Labs:  Na145 mmol/L Glu 85 mg/dL K+ 3.4 mmol/L<L> Cr  5.17 mg/dL<H> BUN 49 mg/dL<H>  Phos 4.3 mg/dL  Alb 2.2 g/dL<L>     CAPILLARY BLOOD GLUCOSE          Skin:     Estimated Needs:   [ ] no change since previous assessment  [ ] recalculated:     Previous Nutrition Diagnosis:   [ ] Inadequate Energy Intake [ ]Inadequate Oral Intake [ ] Excessive Energy Intake   [ ] Underweight [ ] Increased Nutrient Needs [ ] Overweight/Obesity  [ ] Swallowing Difficult   [ ] Altered GI Function [ ] Unintended Weight Loss [ ] Food & Nutrition Related Knowledge Deficit [ ] Malnutrition   [ ] Not Ready for Diet/Life Style Changes     Nutrition Diagnosis is [ ] ongoing  [ ] Improving   [ ] resolved [ ] not applicable     New Nutrition Diagnosis: [ ] not applicable       Interventions:   Recommend  [ ] Change Diet To:  [ ] Nutrition Supplement  [ ] Nutrition Support  [ ] Other:     Monitoring and Evaluation:   [ ] PO intake [ x ] Tolerance to diet prescription [ x ] weights [ x ] labs[ x ] follow up per protocol  [ ] other: Assessment:       Nutrition consult requested for enteral nutrition. Chart reviewed, pt visited, lethargy, NGT tube in place, TF progressing at 10ml/hr, tolerated at present, to keep low rate f  Factors impacting intake: [ ] none [ ] nausea  [ ] vomiting [ ] diarrhea [ ] constipation  [ ]chewing problems [ ] swallowing issues  [ ] other:     Diet Prescription:   'Diet, NPO with Tube Feed:   Tube Feeding Modality: Nasogastric  Nepro with Carb Steady  Continuous  Until Goal Tube Feed Rate (mL per Hour): 10  Tube Feed Duration (in Hours): 24  Tube Feed Start Time: 17:00  Free Water Flush  Pump   Rate (mL per Hour): 215   Frequency: Every 8 Hours  Free Water Flush Instructions:  Per Nephro (23 @ 15:09)    Intake:     Daily Weight in k.8 (2023 08:00)  Weight in k.3 (2023 07:30)    % Weight Change    Pertinent Medications: MEDICATIONS  (STANDING):  albuterol    90 MICROgram(s) HFA Inhaler 2 Puff(s) Inhalation every 6 hours  buPROPion XL (24-Hour) . 150 milliGRAM(s) Oral daily  dextrose 5%. 1000 milliLiter(s) (50 mL/Hr) IV Continuous <Continuous>  dextrose 5%. 1000 milliLiter(s) (50 mL/Hr) IV Continuous <Continuous>  divalproex  milliGRAM(s) Oral two times a day  dorzolamide 2% Ophthalmic Solution 1 Drop(s) Right EYE <User Schedule>  finasteride 5 milliGRAM(s) Oral daily  nystatin Powder 1 Application(s) Topical two times a day  pantoprazole  Injectable 40 milliGRAM(s) IV Push every 24 hours  sucralfate suspension 1 Gram(s) Oral four times a day  tamsulosin 0.4 milliGRAM(s) Oral at bedtime  traZODone 50 milliGRAM(s) Oral at bedtime  vitamin A &amp; D Ointment 1 Application(s) Topical at bedtime    MEDICATIONS  (PRN):  ondansetron Injectable 4 milliGRAM(s) IV Push every 8 hours PRN Nausea and/or Vomiting  sodium chloride 0.9% lock flush 10 milliLiter(s) IV Push every 1 hour PRN Pre/post blood products, medications, blood draw, and to maintain line patency    Pertinent Labs:  Na145 mmol/L Glu 85 mg/dL K+ 3.4 mmol/L<L> Cr  5.17 mg/dL<H> BUN 49 mg/dL<H>  Phos 4.3 mg/dL  Alb 2.2 g/dL<L>     CAPILLARY BLOOD GLUCOSE          Skin:     Estimated Needs:   [ ] no change since previous assessment  [ ] recalculated:     Previous Nutrition Diagnosis:   [ ] Inadequate Energy Intake [ ]Inadequate Oral Intake [ ] Excessive Energy Intake   [ ] Underweight [ ] Increased Nutrient Needs [ ] Overweight/Obesity  [ ] Swallowing Difficult   [ ] Altered GI Function [ ] Unintended Weight Loss [ ] Food & Nutrition Related Knowledge Deficit [ ] Malnutrition   [ ] Not Ready for Diet/Life Style Changes     Nutrition Diagnosis is [ ] ongoing  [ ] Improving   [ ] resolved [ ] not applicable     New Nutrition Diagnosis: [ ] not applicable       Interventions:   Recommend  [ ] Change Diet To:  [ ] Nutrition Supplement  [ ] Nutrition Support  [ ] Other:     Monitoring and Evaluation:   [ ] PO intake [ x ] Tolerance to diet prescription [ x ] weights [ x ] labs[ x ] follow up per protocol  [ ] other: Assessment:       Nutrition consult requested for enteral nutrition. Chart reviewed, pt visited, lethargy, NGT tube in place, TF progressing at 10ml/hr, tolerated at present, to keep low rate due to high risk for aspiration per RN; s/p Swallow re-evaluation 23 with Mildly Thickly FLuid per SLP, NGT inserted 23 for poor oral intake; "s/p N/V with formula contents 100ml , Feeding held in AM then restarted" per Provider; eGFT=5, K=3.4, PO4=4.3ATN on CKD after shork and bleeding, may need HD if azotemia is worsening. per Nephrology,     Factors impacting intake:  [ x ] other: acute on chronic comorbidities, persistent poor oral intake    Diet Prescription:   'Diet, NPO with Tube Feed:   Tube Feeding Modality: Nasogastric  Nepro with Carb Steady  Continuous  Until Goal Tube Feed Rate (mL per Hour): 10  Tube Feed Duration (in Hours): 24  Tube Feed Start Time: 17:00  Free Water Flush  Pump   Rate (mL per Hour): 215   Frequency: Every 8 Hours  Free Water Flush Instructions:  Per Nephro (23 @ 15:09)    Intake:     Daily Weight in k.8 (2023 08:00)  Weight in k.3 (2023 07:30)    % Weight Change: upward trend, may due to scale/fluid changes; 3+/4+ extremity edema     Pertinent Medications: MEDICATIONS  (STANDING):  albuterol    90 MICROgram(s) HFA Inhaler 2 Puff(s) Inhalation every 6 hours  buPROPion XL (24-Hour) . 150 milliGRAM(s) Oral daily  dextrose 5%. 1000 milliLiter(s) (50 mL/Hr) IV Continuous <Continuous>  dextrose 5%. 1000 milliLiter(s) (50 mL/Hr) IV Continuous <Continuous>  divalproex  milliGRAM(s) Oral two times a day  dorzolamide 2% Ophthalmic Solution 1 Drop(s) Right EYE <User Schedule>  finasteride 5 milliGRAM(s) Oral daily  nystatin Powder 1 Application(s) Topical two times a day  pantoprazole  Injectable 40 milliGRAM(s) IV Push every 24 hours  sucralfate suspension 1 Gram(s) Oral four times a day  tamsulosin 0.4 milliGRAM(s) Oral at bedtime  traZODone 50 milliGRAM(s) Oral at bedtime  vitamin A &amp; D Ointment 1 Application(s) Topical at bedtime    MEDICATIONS  (PRN):  ondansetron Injectable 4 milliGRAM(s) IV Push every 8 hours PRN Nausea and/or Vomiting  sodium chloride 0.9% lock flush 10 milliLiter(s) IV Push every 1 hour PRN Pre/post blood products, medications, blood draw, and to maintain line patency    Pertinent Labs:  Na145 mmol/L Glu 85 mg/dL K+ 3.4 mmol/L<L> Cr  5.17 mg/dL<H> BUN 49 mg/dL<H>  Phos 4.3 mg/dL  Alb 2.2 g/dL<L>     CAPILLARY BLOOD GLUCOSE    Skin: healed pressure ulcer I per Flowsheet    Estimated Needs:   [ x ] no change since previous assessment  [ ] recalculated:     Previous Nutrition Diagnosis:   [ x ] Malnutrition ( Moderate)    Nutrition Diagnosis is [ x ] ongoing  [ ] Improving   [ ] resolved [ ] not applicable     New Nutrition Diagnosis: [ x ] not applicable     Interventions/Recommend  [ ] Change Diet To:  [ ] Nutrition Supplement  [ x ] Nutrition Support: May consider to increase TF as medically feasible, Goal: Nepro @ 40 ml/h x 24 h  MD to adjust free water as needed  ( 960 ml, 1728 kcal, 78 g protein, 698 ml water daily)   MD to adjust free water as needed   [ x ] Other: Discussed with RN    Monitoring and Evaluation:   [ ] PO intake [ x ] Tolerance to diet prescription [ x ] weights [ x ] labs[ x ] follow up per protocol  [ ] other: Assessment:       Nutrition consult requested for enteral nutrition. Chart reviewed, pt visited, lethargy, NGT tube in place, TF progressing at 10ml/hr, tolerated at present, to keep low rate due to high risk for aspiration per RN; s/p Swallow re-evaluation 23 with Mildly Thickly FLuid per SLP, NGT inserted 23 for poor oral intake; "s/p N/V with formula contents 100ml , Feeding held in AM then restarted" from chart, s/p Reglan, unable to restart due to cardiac issue, will increase TF slowly per NP; eGFT=5, K=3.4, PO4=4.3ATN on CKD after shork and bleeding, may need HD if azotemia is worsening. per Nephrology,     Factors impacting intake:  [ x ] other: acute on chronic comorbidities, persistent poor oral intake    Diet Prescription:   'Diet, NPO with Tube Feed:   Tube Feeding Modality: Nasogastric  Nepro with Carb Steady  Continuous  Until Goal Tube Feed Rate (mL per Hour): 10  Tube Feed Duration (in Hours): 24  Tube Feed Start Time: 17:00  Free Water Flush  Pump   Rate (mL per Hour): 215   Frequency: Every 8 Hours  Free Water Flush Instructions:  Per Nephro (23 @ 15:09)    Intake:     Daily Weight in k.8 (2023 08:00)  Weight in k.3 (2023 07:30)    % Weight Change: upward trend, may due to scale/fluid changes; 3+/4+ extremity edema     Pertinent Medications: MEDICATIONS  (STANDING):  albuterol    90 MICROgram(s) HFA Inhaler 2 Puff(s) Inhalation every 6 hours  buPROPion XL (24-Hour) . 150 milliGRAM(s) Oral daily  dextrose 5%. 1000 milliLiter(s) (50 mL/Hr) IV Continuous <Continuous>  dextrose 5%. 1000 milliLiter(s) (50 mL/Hr) IV Continuous <Continuous>  divalproex  milliGRAM(s) Oral two times a day  dorzolamide 2% Ophthalmic Solution 1 Drop(s) Right EYE <User Schedule>  finasteride 5 milliGRAM(s) Oral daily  nystatin Powder 1 Application(s) Topical two times a day  pantoprazole  Injectable 40 milliGRAM(s) IV Push every 24 hours  sucralfate suspension 1 Gram(s) Oral four times a day  tamsulosin 0.4 milliGRAM(s) Oral at bedtime  traZODone 50 milliGRAM(s) Oral at bedtime  vitamin A &amp; D Ointment 1 Application(s) Topical at bedtime    MEDICATIONS  (PRN):  ondansetron Injectable 4 milliGRAM(s) IV Push every 8 hours PRN Nausea and/or Vomiting  sodium chloride 0.9% lock flush 10 milliLiter(s) IV Push every 1 hour PRN Pre/post blood products, medications, blood draw, and to maintain line patency    Pertinent Labs:  Na145 mmol/L Glu 85 mg/dL K+ 3.4 mmol/L<L> Cr  5.17 mg/dL<H> BUN 49 mg/dL<H>  Phos 4.3 mg/dL  Alb 2.2 g/dL<L>     CAPILLARY BLOOD GLUCOSE    Skin: healed pressure ulcer I per Flowsheet    Estimated Needs:   [ x ] no change since previous assessment  [ ] recalculated:     Previous Nutrition Diagnosis:   [ x ] Malnutrition ( Moderate)    Nutrition Diagnosis is [ x ] ongoing  [ ] Improving   [ ] resolved [ ] not applicable     New Nutrition Diagnosis: [ x ] not applicable     Interventions/Recommend  [ ] Change Diet To:  [ ] Nutrition Supplement  [ x ] Nutrition Support: May consider to increase TF as medically feasible/tolerated, Goal: Nepro @ 40 ml/h x 24 h  MD to adjust free water as needed  ( 960 ml, 1728 kcal, 78 g protein, 698 ml water daily)   MD to adjust free water as needed   [ x ] Other: Discussed with RN/NP    Monitoring and Evaluation:   [ ] PO intake [ x ] Tolerance to diet prescription [ x ] weights [ x ] labs[ x ] follow up per protocol  [ ] other:

## 2023-06-19 NOTE — PROGRESS NOTE ADULT - PROBLEM SELECTOR PLAN 12
From [facility]  SCDs for DVT prophylaxis.  PT rec TG.   Daily CBC ,CMP, coags, fibrinogen, d-dimer  F/U inflammatory markers.   HD may be needed if Cr keeps trending up.   s/p 1 PRBC and 1 unit pf platelet 6/17  s/p NG tube insertion 6/17 to right nare.   Dietitian consult.  monitor for N/V, BM.

## 2023-06-19 NOTE — BH CONSULTATION LIAISON PROGRESS NOTE - NSBHFUPINTERVALHXFT_PSY_A_CORE
Patient seen and chart reviewed. Patient recently transferred from the ICU. Team concerned regarding his worsening health, his understanding regarding his prognosis and his worsening platelet count. Patient found awake and alert. He was oriented to person, place and partially to time. Says that he has been more and more sick, although his understanding regarding the extent of his illnesses is unclear. Says that if his health worsens he still wants everything done so as to live. He denies any SI, HI or AVH. His platelet has continued to steadily trend down, with no clear etiology to explain it.

## 2023-06-19 NOTE — PROGRESS NOTE ADULT - ASSESSMENT
64 y/o M, current resident of [facility], A&Ox3, bedbound at baseline, chronic Viera catheter with PMHx of  CKD (baseline creat 3.8), COPD, CHFpEF, BPH, PVD, Lymphedema, multiple decubitus ulcers (Sacrum/gluteal) , Anemia, polyneuropathy, polyarthritis, hypothyroidism, seizures and Bipolar disorder.  Originally BIBEMS from [facility] to [facility] late evening 5/28 for abdominal pain, nausea, and vomiting, including reports of bloody emesis.    Patient originally admitted to ICU for sepsis 2/2 CAUTI and suspected UGI bleeding.  Initial CT of abd/pelvis notable for circumferential wall thickening of the distal esophagus c/w esophagitis and bladder wall thickening but otherwise negative for intraabdominal pathology.  Started on IV vancomycin + cefepime, and IV Protonix.  Underwent EGD on 5/30 which showed ulcerative esophagitis.  Found to have Pseudomonas bacteremia (pan-sensitive).  Failed TOV, Coude Viera catheter reinserted 6/6.  Per chart review, patient does not want to return to [facility], asking for different facility, frustrated by delay in discharge (no accepting facilities yet), ? now refusing to participate in PT and speech therapy, refusing to eat.  WBC spiked to 12 on 6/8.  Also started developing worsening SHEYLA.  On 6/9 patient developed worsening confusion and disorientation; RRT called for hypotension, fever, and anemia, patient transferred back to ICU with concern for evolving septic vs. hypovolemic shock.  Started back on Levophed, antibiotics continued.  Repeat blood cultures from 6/10 have remained NGTD.  Transfused 2 units PRBCs for Hgb of 5.3.  Underwent repeat EGD on 6/12 which showed significant healing (95%) of prior esophagitis with no active bleeding.  Mental status slowly improved towards baseline, patient downgraded back to AI unit on 6/14.  Original antibiotic course completed.  Hospital course further complicated by worsening anemia and thrombocytopenia, HIT panel negative but repeat DIC panel +, received 5 units of cryoprecipitate on 6/15, and 1 unit platelets/1 unit PRBCs on 6/17.  Also developed persistent nausea and vomiting, ? poorly responsive to IV Reglan, NGT inserted on 5/17 for persistent symptoms, also started on NG feeds.  Heme/Onc consulted, thrombocytopenia felt to be multifactorial in nature, Depakote now discontinued.  Has continued severe SHEYLA on CKD without resolution.

## 2023-06-19 NOTE — PROGRESS NOTE ADULT - ASSESSMENT
complete note to follow    #VTE Prophylaxis     Assessment and Plan:   · Assessment	    #Normocytic Anemia  #Thrombocytopenia  GNR sepsis, hypotension d/t UTI, esophagitis  SHEYLA on CKD  repeat BCx now (-)  no active bleeding/ecchymosis  LFT's nl  elevated PT/PTT  PF4 (-)  peripheral smear NO schistocytes  hemolysis (-)  iron panel c/w ACD, hyperferritinemia likely d/t reactive process  Low TSH  DIC panel was +  Rec's:  -etiology thrombocytopenia multi-factorial GNR sepsis/Esophagitis/on valproate sodium  -etiology anemia SHEYLA on CKD, infxn, nutritional, drug induced  -H/H stable, s/p repeat EGD resolving esophagitis, no bleeding, cont PPI  -MM w/u c/w MGUS, no tx indicated  -Platelet cont trending down, now 38k  -no active bleeding  -repeat fibrinogen and D-dimer now nl  -s/p Cryo tx  with improvement in Fibrinogen levels   -INR now wnl  -supportive care  -pt is on depakote which can cause thrombocytopenia, consider speaking with Psych about changing medication for bipolar  -hepatitis panel (-), repeat [id number] (pt does have polyarthritis), B/12, folate, ROSANNE, FABRICE nl, SPEP nl, RF nl  -consider checking inflammatory markers  -Cryo indicated if fibrinogen <100   -PRBC transfusion if Hgb <7.0 or symptomatic  -GI on board, pt with esophagitis, on PPI  -Transfuse SDP if Plt <20k in setting of sepsis  -avoid non-essential meds that can exacerbate plt drop (i.e. H2 blocker)  -monitor CBC daily  -always check post SDP transfusion plt level within 1 hour to confirm adequate response    Thank you for the referral. Will continue to monitor the patient.  Please call with any questions [phone]  Above reviewed with Attending Dr. Vera POON/[facility]  [address] [facility] [address], [address], [place], [place]  [phone]  *Note not finalized until signed by Attending Physician         Assessment and Plan:   · Assessment	    #Normocytic Anemia  #Thrombocytopenia  GNR sepsis, hypotension d/t UTI, esophagitis  SHEYLA on CKD  repeat BCx now (-)  no active bleeding/ecchymosis  LFT's nl  elevated PT/PTT  PF4 (-)  peripheral smear NO schistocytes  hemolysis (-)  iron panel c/w ACD, hyperferritinemia likely d/t reactive process  Low TSH  DIC panel was + 6/15  Rec's:  -etiology thrombocytopenia multi-factorial s/p GNR sepsis/Esophagitis/and cont on valproate sodium  -etiology anemia SHEYLA on CKD, infxn, nutritional, drug induced  -H/H stable, s/p repeat EGD resolving esophagitis, no bleeding, cont PPI  -MM w/u c/w MGUS, no tx indicated  -Platelet cont trending down, now 38k  -no active bleeding  -repeat fibrinogen and D-dimer now nl  -Cryo indicated if fibrinogen <100   -s/p Cryo tx given 6/15    -INR now wnl  -supportive care  -pt is on depakote which can cause thrombocytopenia, consider speaking with Psych about changing medication for bipolar  -hepatitis panel (-), repeat [id number] (pt does have polyarthritis), B/12, folate, ROSANNE, FABRICE nl, SPEP nl, RF nl  -consider checking inflammatory markers  -PRBC transfusion if Hgb <7.0 or symptomatic  -GI on board, pt with esophagitis, on PPI  -Transfuse SDP if Plt <20k or active bleeding  -avoid non-essential meds that can exacerbate plt drop (i.e. H2 blocker)  -monitor CBC daily  -always check post SDP transfusion plt level within 1 hour to confirm adequate response    #RUE edema  doppler (-)  elevate  mgmt as per Medicine Team    Thank you for the referral. Will continue to monitor the patient.  Please call with any questions [phone]  Above reviewed with Attending Dr. Gamez  A/[facility]  [address] [facility], [address], [place], [place]  [phone]  *Note not finalized until signed by Attending Physician

## 2023-06-19 NOTE — PROGRESS NOTE ADULT - PROBLEM SELECTOR PLAN 9
Continue valproic acid  Maintain seizure precautions. Continue buproprion XL 150mg daily  Trazodone 50mg HS  Discontinued Depakote 2/2 thrombocytopenia.   Discussed with Hem/Onc and Psych  Psych re-eval for capacity. Patient at this time deemed capable of making decisions.

## 2023-06-19 NOTE — PROGRESS NOTE ADULT - PROBLEM SELECTOR PLAN 1
Secondary to pseudomonas bacteremia and UTI  Blood culture from 6/11 Negative.  Antibiotics completed.  Continue to monitor off antibiotics.  Afebrile.   on gentle hydration. Secondary to pseudomonas bacteremia and UTI  Blood culture from 6/11 Negative.  Antibiotics completed.  Continue to monitor off antibiotics.  Afebrile.   IVF on hold 2/2 chest congestion and CXR noted.   Lasix held 2/2 soft bp.   No leukocytosis.

## 2023-06-19 NOTE — PROGRESS NOTE ADULT - SUBJECTIVE AND OBJECTIVE BOX
Patient is a 63y old  Male who presents with a chief complaint of sepsis     SUBJECTIVE / OVERNIGHT EVENTS:      MEDICATIONS  (STANDING):  albuterol    90 MICROgram(s) HFA Inhaler 2 Puff(s) Inhalation every 6 hours  buPROPion XL (24-Hour) . 150 milliGRAM(s) Oral daily  dextrose 5%. 1000 milliLiter(s) (50 mL/Hr) IV Continuous <Continuous>  dextrose 5%. 1000 milliLiter(s) (50 mL/Hr) IV Continuous <Continuous>  divalproex  milliGRAM(s) Oral two times a day  dorzolamide 2% Ophthalmic Solution 1 Drop(s) Right EYE <User Schedule>  finasteride 5 milliGRAM(s) Oral daily  nystatin Powder 1 Application(s) Topical two times a day  pantoprazole  Injectable 40 milliGRAM(s) IV Push every 24 hours  sucralfate suspension 1 Gram(s) Oral four times a day  tamsulosin 0.4 milliGRAM(s) Oral at bedtime  traZODone 50 milliGRAM(s) Oral at bedtime  vitamin A &amp; D Ointment 1 Application(s) Topical at bedtime    MEDICATIONS  (PRN):  ondansetron Injectable 4 milliGRAM(s) IV Push every 8 hours PRN Nausea and/or Vomiting  sodium chloride 0.9% lock flush 10 milliLiter(s) IV Push every 1 hour PRN Pre/post blood products, medications, blood draw, and to maintain line patency    CAPILLARY BLOOD GLUCOSE        I&O's Summary    18 Jun 2023 07:01  -  19 Jun 2023 07:00  --------------------------------------------------------  IN: 0 mL / OUT: 600 mL / NET: -600 mL        PHYSICAL EXAM:  Vital Signs Last 24 Hrs  T(C): 36.6 (19 Jun 2023 05:11), Max: 37.1 (18 Jun 2023 12:27)  T(F): 97.8 (19 Jun 2023 05:11), Max: 98.8 (18 Jun 2023 12:27)  HR: 89 (19 Jun 2023 05:11) (72 - 89)  BP: 142/76 (19 Jun 2023 05:11) (102/48 - 142/76)  BP(mean): 90 (19 Jun 2023 05:11) (90 - 90)  RR: 19 (19 Jun 2023 05:11) (18 - 19)  SpO2: 98% (19 Jun 2023 05:11) (98% - 100%)    Parameters below as of 19 Jun 2023 05:11  Patient On (Oxygen Delivery Method): nasal cannula  O2 Flow (L/min): 2      LABS:                        7.2    4.74  )-----------( 38       ( 19 Jun 2023 06:03 )             23.9     06-19    145  |  114<H>  |  49<H>  ----------------------------<  85  3.4<L>   |  19<L>  |  5.17<H>    Ca    9.7      19 Jun 2023 06:03  Phos  4.3     06-19  Mg     2.1     06-19    TPro  5.0<L>  /  Alb  2.2<L>  /  TBili  0.6  /  DBili  x   /  AST  5<L>  /  ALT  7<L>  /  AlkPhos  81  06-19    PT/INR - ( 19 Jun 2023 06:03 )   PT: 13.7 sec;   INR: 1.15 ratio         PTT - ( 19 Jun 2023 06:03 )  PTT:36.9 sec          SARS-CoV-2: NotDetec (10 Carlin 2023 10:35)  COVID-19 PCR: NotDetec (07 Jun 2023 06:43)  COVID-19 PCR: NotDetec (04 Jun 2023 18:40)  SARS-CoV-2: NotDetec (28 May 2023 21:02)             Patient is a 63y old  Male who presents with a chief complaint of sepsis     SUBJECTIVE / OVERNIGHT EVENTS:      MEDICATIONS  (STANDING):  albuterol    90 MICROgram(s) HFA Inhaler 2 Puff(s) Inhalation every 6 hours  buPROPion XL (24-Hour) . 150 milliGRAM(s) Oral daily  dextrose 5%. 1000 milliLiter(s) (50 mL/Hr) IV Continuous <Continuous>  dextrose 5%. 1000 milliLiter(s) (50 mL/Hr) IV Continuous <Continuous>  divalproex  milliGRAM(s) Oral two times a day  dorzolamide 2% Ophthalmic Solution 1 Drop(s) Right EYE <User Schedule>  finasteride 5 milliGRAM(s) Oral daily  nystatin Powder 1 Application(s) Topical two times a day  pantoprazole  Injectable 40 milliGRAM(s) IV Push every 24 hours  sucralfate suspension 1 Gram(s) Oral four times a day  tamsulosin 0.4 milliGRAM(s) Oral at bedtime  traZODone 50 milliGRAM(s) Oral at bedtime  vitamin A &amp; D Ointment 1 Application(s) Topical at bedtime    MEDICATIONS  (PRN):  ondansetron Injectable 4 milliGRAM(s) IV Push every 8 hours PRN Nausea and/or Vomiting  sodium chloride 0.9% lock flush 10 milliLiter(s) IV Push every 1 hour PRN Pre/post blood products, medications, blood draw, and to maintain line patency    CAPILLARY BLOOD GLUCOSE        I&O's Summary    18 Jun 2023 07:01  -  19 Jun 2023 07:00  --------------------------------------------------------  IN: 0 mL / OUT: 600 mL / NET: -600 mL        PHYSICAL EXAM:  Vital Signs Last 24 Hrs  T(C): 36.6 (19 Jun 2023 05:11), Max: 37.1 (18 Jun 2023 12:27)  T(F): 97.8 (19 Jun 2023 05:11), Max: 98.8 (18 Jun 2023 12:27)  HR: 89 (19 Jun 2023 05:11) (72 - 89)  BP: 142/76 (19 Jun 2023 05:11) (102/48 - 142/76)  BP(mean): 90 (19 Jun 2023 05:11) (90 - 90)  RR: 19 (19 Jun 2023 05:11) (18 - 19)  SpO2: 98% (19 Jun 2023 05:11) (98% - 100%)    Parameters below as of 19 Jun 2023 05:11  Patient On (Oxygen Delivery Method): nasal cannula  O2 Flow (L/min): 2      GEN: NAD; A and O x 3, appears weak/pale   LUNGS: scattered rhonchi  HEART: S1 S2  ABDOMEN: soft, non-tender, non-distended, + BS  EXTREMITIES: B/L UE/LE edema, hyperpigmentation, scattered ecchymosis, no petechiae seen      LABS:                        7.2    4.74  )-----------( 38       ( 19 Jun 2023 06:03 )             23.9     06-19    145  |  114<H>  |  49<H>  ----------------------------<  85  3.4<L>   |  19<L>  |  5.17<H>    Ca    9.7      19 Jun 2023 06:03  Phos  4.3     06-19  Mg     2.1     06-19    TPro  5.0<L>  /  Alb  2.2<L>  /  TBili  0.6  /  DBili  x   /  AST  5<L>  /  ALT  7<L>  /  AlkPhos  81  06-19    PT/INR - ( 19 Jun 2023 06:03 )   PT: 13.7 sec;   INR: 1.15 ratio         PTT - ( 19 Jun 2023 06:03 )  PTT:36.9 sec          SARS-CoV-2: NotDetec (10 Carlin 2023 10:35)  COVID-19 PCR: NotDetec (07 Jun 2023 06:43)  COVID-19 PCR: NotDetec (04 Jun 2023 18:40)  SARS-CoV-2: NotDetec (28 May 2023 21:02)             Patient is a 63y old  Male who presents with a chief complaint of sepsis     SUBJECTIVE / OVERNIGHT EVENTS: events noted. Pt c/o lower abdominal pain and right arm pain. No bleeding      MEDICATIONS  (STANDING):  albuterol    90 MICROgram(s) HFA Inhaler 2 Puff(s) Inhalation every 6 hours  buPROPion XL (24-Hour) . 150 milliGRAM(s) Oral daily  dextrose 5%. 1000 milliLiter(s) (50 mL/Hr) IV Continuous <Continuous>  dextrose 5%. 1000 milliLiter(s) (50 mL/Hr) IV Continuous <Continuous>  divalproex  milliGRAM(s) Oral two times a day  dorzolamide 2% Ophthalmic Solution 1 Drop(s) Right EYE <User Schedule>  finasteride 5 milliGRAM(s) Oral daily  nystatin Powder 1 Application(s) Topical two times a day  pantoprazole  Injectable 40 milliGRAM(s) IV Push every 24 hours  sucralfate suspension 1 Gram(s) Oral four times a day  tamsulosin 0.4 milliGRAM(s) Oral at bedtime  traZODone 50 milliGRAM(s) Oral at bedtime  vitamin A &amp; D Ointment 1 Application(s) Topical at bedtime    MEDICATIONS  (PRN):  ondansetron Injectable 4 milliGRAM(s) IV Push every 8 hours PRN Nausea and/or Vomiting  sodium chloride 0.9% lock flush 10 milliLiter(s) IV Push every 1 hour PRN Pre/post blood products, medications, blood draw, and to maintain line patency    CAPILLARY BLOOD GLUCOSE        I&O's Summary    18 Jun 2023 07:01  -  19 Jun 2023 07:00  --------------------------------------------------------  IN: 0 mL / OUT: 600 mL / NET: -600 mL        PHYSICAL EXAM:  Vital Signs Last 24 Hrs  T(C): 36.6 (19 Jun 2023 05:11), Max: 37.1 (18 Jun 2023 12:27)  T(F): 97.8 (19 Jun 2023 05:11), Max: 98.8 (18 Jun 2023 12:27)  HR: 89 (19 Jun 2023 05:11) (72 - 89)  BP: 142/76 (19 Jun 2023 05:11) (102/48 - 142/76)  BP(mean): 90 (19 Jun 2023 05:11) (90 - 90)  RR: 19 (19 Jun 2023 05:11) (18 - 19)  SpO2: 98% (19 Jun 2023 05:11) (98% - 100%)    Parameters below as of 19 Jun 2023 05:11  Patient On (Oxygen Delivery Method): nasal cannula  O2 Flow (L/min): 2      GEN: NAD; A and O x 3, appears weak/pale   LUNGS: scattered rhonchi  HEART: S1 S2  ABDOMEN: soft, non-tender, non-distended, + BS  EXTREMITIES: B/L UE/LE edema, hyperpigmentation, scattered ecchymosis, no petechiae seen      LABS:                        7.2    4.74  )-----------( 38       ( 19 Jun 2023 06:03 )             23.9     06-19    145  |  114<H>  |  49<H>  ----------------------------<  85  3.4<L>   |  19<L>  |  5.17<H>    Ca    9.7      19 Jun 2023 06:03  Phos  4.3     06-19  Mg     2.1     06-19    TPro  5.0<L>  /  Alb  2.2<L>  /  TBili  0.6  /  DBili  x   /  AST  5<L>  /  ALT  7<L>  /  AlkPhos  81  06-19    PT/INR - ( 19 Jun 2023 06:03 )   PT: 13.7 sec;   INR: 1.15 ratio         PTT - ( 19 Jun 2023 06:03 )  PTT:36.9 sec          SARS-CoV-2: NotDetec (10 Carlin 2023 10:35)  COVID-19 PCR: NotDetec (07 Jun 2023 06:43)  COVID-19 PCR: NotDetec (04 Jun 2023 18:40)  SARS-CoV-2: NotDetec (28 May 2023 21:02)      Radiology:  < from: US Duplex Venous Upper Ext Ltd, Right (06.17.23 @ 10:14) >    ACC: [id number] EXAM:  US DPLX UPR EXT VEINS LTD RT   ORDERED BY: FABRICE WORTHY     PROCEDURE DATE:  06/17/2023          INTERPRETATION:  CLINICAL INFORMATION: Right upper extremity swelling.    COMPARISON: None available.    TECHNIQUE: Duplex sonography of the RIGHT UPPER extremity veins with   color and spectral Doppler, with and without compression.    FINDINGS:    The right internal jugular, subclavian, axillary and brachial veins are   patent and compressible where applicable.  The basilic vein (superficial   vein) is patent and without thrombus.  The cephalic vein (superficial   vein) is patent and without thrombus.    Doppler examination shows normal spontaneous and phasic flow.  Subcutaneous edema.    IMPRESSION:  No evidence of right upper extremity deep venous thrombosis.    < end of copied text >

## 2023-06-19 NOTE — PROGRESS NOTE ADULT - PROBLEM SELECTOR PLAN 5
Depakote discontinued due to persistent thrombocytopenia  Continue Buproprion XL and trazodone  Psychiatry/ input appreciated

## 2023-06-19 NOTE — PROGRESS NOTE ADULT - PROBLEM SELECTOR PLAN 10
Continue buproprion XL 150mg daily  Trazodone 50mg HS  Continue Depakote  Psych rec to continue Depakote. Chronic Daley Failed multiple TOV.  C/w Flomax and finasteride when vomiting stops.  Monitor urine output  c/w daley care Per Nephro.

## 2023-06-19 NOTE — PROGRESS NOTE ADULT - SUBJECTIVE AND OBJECTIVE BOX
GAYLA PEARCE    SCU progress note    INTERVAL HPI/OVERNIGHT EVENTS: No acute events overnight    FULL CODE    Covid - 19 PCR:     The 4Ms    What Matters Most: see GOC  Age appropriate Medications/Screen for High Risk Medication: Yes  Mentation: see CAM below  Mobility: defer to physical exam    The Confusion Assessment Method (CAM) Diagnostic Algorithm     1: Acute Onset or Fluctuating Course  - Is there evidence of an acute change in mental status from the patient’s baseline? Did the (abnormal) behavior  fluctuate during the day, that is, tend to come and go, or increase and decrease in severity?  [ ] YES [x ] NO     2: Inattention  - Did the patient have difficulty focusing attention, being easily distractible, or having difficulty keeping track of what was being said?  [ ] YES [x ] NO     3: Disorganized thinking  -Was the patient’s thinking disorganized or incoherent, such as rambling or irrelevant conversation, unclear or illogical flow of ideas, or unpredictable switching from subject to subject?  [ ] YES [x ] NO    4: Altered Level of consciousness?  [ ] YES [x ] NO    The diagnosis of delirium by CAM requires the presence of features 1 and 2 and either 3 or 4.    PRESSORS: [ ] YES [ x] NO    Cardiovascular:  Heart Failure  Acute   Acute on Chronic  Chronic         Pulmonary:  albuterol    90 MICROgram(s) HFA Inhaler 2 Puff(s) Inhalation every 6 hours    Hematalogic:    Other:  buPROPion XL (24-Hour) . 150 milliGRAM(s) Oral daily  dextrose 5%. 1000 milliLiter(s) IV Continuous <Continuous>  dextrose 5%. 1000 milliLiter(s) IV Continuous <Continuous>  divalproex  milliGRAM(s) Oral two times a day  dorzolamide 2% Ophthalmic Solution 1 Drop(s) Right EYE <User Schedule>  finasteride 5 milliGRAM(s) Oral daily  nystatin Powder 1 Application(s) Topical two times a day  ondansetron Injectable 4 milliGRAM(s) IV Push every 8 hours PRN  pantoprazole  Injectable 40 milliGRAM(s) IV Push every 24 hours  sodium chloride 0.9% lock flush 10 milliLiter(s) IV Push every 1 hour PRN  sucralfate suspension 1 Gram(s) Oral four times a day  tamsulosin 0.4 milliGRAM(s) Oral at bedtime  traZODone 50 milliGRAM(s) Oral at bedtime  vitamin A &amp; D Ointment 1 Application(s) Topical at bedtime    albuterol    90 MICROgram(s) HFA Inhaler 2 Puff(s) Inhalation every 6 hours  buPROPion XL (24-Hour) . 150 milliGRAM(s) Oral daily  dextrose 5%. 1000 milliLiter(s) IV Continuous <Continuous>  dextrose 5%. 1000 milliLiter(s) IV Continuous <Continuous>  divalproex  milliGRAM(s) Oral two times a day  dorzolamide 2% Ophthalmic Solution 1 Drop(s) Right EYE <User Schedule>  finasteride 5 milliGRAM(s) Oral daily  nystatin Powder 1 Application(s) Topical two times a day  ondansetron Injectable 4 milliGRAM(s) IV Push every 8 hours PRN  pantoprazole  Injectable 40 milliGRAM(s) IV Push every 24 hours  sodium chloride 0.9% lock flush 10 milliLiter(s) IV Push every 1 hour PRN  sucralfate suspension 1 Gram(s) Oral four times a day  tamsulosin 0.4 milliGRAM(s) Oral at bedtime  traZODone 50 milliGRAM(s) Oral at bedtime  vitamin A &amp; D Ointment 1 Application(s) Topical at bedtime    Drug Dosing Weight  Height (cm): 172.7 (09 Jun 2023 21:00)  Weight (kg): 84.2 (09 Jun 2023 21:00)  BMI (kg/m2): 28.2 (09 Jun 2023 21:00)  BSA (m2): 1.98 (09 Jun 2023 21:00)    CENTRAL LINE: [ ] YES [x ] NO  LOCATION:   DATE INSERTED:  REMOVE: [ ] YES [ ] NO  EXPLAIN:    HATFIELD: [x ] YES [ ] NO    DATE INSERTED:  REMOVE:  [ ] YES [ ] NO  EXPLAIN:    PAST MEDICAL & SURGICAL HISTORY:  Hypothyroid      Hyperlipidemia      Ambulatory dysfunction      Anemia      History of BPH      CKD (chronic kidney disease)      Chronic obstructive pulmonary disease (COPD)      Bipolar disorder      HLD (hyperlipidemia)      BPH (benign prostatic hyperplasia)      Chronic diastolic congestive heart failure      Lymphedema      Chronic leg pain      No significant past surgical history      06-18 @ 07:01  -  06-19 @ 07:00  --------------------------------------------------------  IN: 0 mL / OUT: 600 mL / NET: -600 mL      PHYSICAL EXAM:    GENERAL: NAD, Anasarca  HEAD:  Atraumatic, Normocephalic  EYES:  PERRLA, conjunctiva and sclera clear  ENMT:  Moist mucous membranes  NECK: Supple, No JVD, Normal thyroid  NERVOUS SYSTEM:  Alert & Oriented X, Good concentration;   CHEST/LUNG: Clear to percussion bilaterally; No rales, rhonchi, wheezing, or rubs  HEART: Regular rate and rhythm; No murmurs, rubs, or gallops  ABDOMEN: Soft, Nontender, Nondistended; Bowel sounds present  EXTREMITIES:  2+ Peripheral Pulses, No clubbing, cyanosis, + edema Speedy. Anasarca.   SKIN: Sacral wound      LABS:  CBC Full  -  ( 19 Jun 2023 06:03 )  WBC Count : 4.74 K/uL  RBC Count : 2.43 M/uL  Hemoglobin : 7.2 g/dL  Hematocrit : 23.9 %  Platelet Count - Automated : 38 K/uL  Mean Cell Volume : 98.4 fl  Mean Cell Hemoglobin : 29.6 pg  Mean Cell Hemoglobin Concentration : 30.1 gm/dL  Auto Neutrophil # : x  Auto Lymphocyte # : x  Auto Monocyte # : x  Auto Eosinophil # : x  Auto Basophil # : x  Auto Neutrophil % : x  Auto Lymphocyte % : x  Auto Monocyte % : x  Auto Eosinophil % : x  Auto Basophil % : x    06-19    145  |  114<H>  |  49<H>  ----------------------------<  85  3.4<L>   |  19<L>  |  5.17<H>    Ca    9.7      19 Jun 2023 06:03  Phos  4.3     06-19  Mg     2.1     06-19    TPro  5.0<L>  /  Alb  2.2<L>  /  TBili  0.6  /  DBili  x   /  AST  5<L>  /  ALT  7<L>  /  AlkPhos  81  06-19    PT/INR - ( 19 Jun 2023 06:03 )   PT: 13.7 sec;   INR: 1.15 ratio         PTT - ( 19 Jun 2023 06:03 )  PTT:36.9 sec      [ x ]  DVT Prophylaxis SCDs  [  ]  Tube feecing    RADIOLOGY & ADDITIONAL STUDIES:    < from: Xray Chest 1 View- PORTABLE-Urgent (Xray Chest 1 View- PORTABLE-Urgent .) (06.17.23 @ 18:48) >    ACC: [id number] EXAM:  XR CHEST PORTABLE URGENT 1V   ORDERED BY: FABRICE WORTHY     PROCEDURE DATE:  06/17/2023          INTERPRETATION:  CLINICAL STATEMENT: NG tube placement    TECHNIQUE: AP view of the chest.      COMPARISON: 6/14/2023    Frontal radiographs centered to the upper abdomen performed for   confirmation of NG tube position.    Distal NG tube seen upper abdomen, expected region of the stomach.    No significant interval change elevation/eventration right hemidiaphragm   with overlying;effusion, atelectasis and/or infiltrate.    IMPRESSION:    Distal NG tube left upper abdomen, expected region of the stomach.    --- End of Report ---      < end of copied text >      Plan of care discussed with intensivist and consultants.      GAYLA PEARCE    SCU progress note    INTERVAL HPI/OVERNIGHT EVENTS: No acute events overnight    FULL CODE    Covid - 19 PCR: 6/10/2023     The 4Ms    What Matters Most: see GOC  Age appropriate Medications/Screen for High Risk Medication: Yes  Mentation: see CAM below  Mobility: defer to physical exam    The Confusion Assessment Method (CAM) Diagnostic Algorithm     1: Acute Onset or Fluctuating Course  - Is there evidence of an acute change in mental status from the patient’s baseline? Did the (abnormal) behavior  fluctuate during the day, that is, tend to come and go, or increase and decrease in severity?  [ ] YES [x ] NO     2: Inattention  - Did the patient have difficulty focusing attention, being easily distractible, or having difficulty keeping track of what was being said?  [ ] YES [x ] NO     3: Disorganized thinking  -Was the patient’s thinking disorganized or incoherent, such as rambling or irrelevant conversation, unclear or illogical flow of ideas, or unpredictable switching from subject to subject?  [ ] YES [x ] NO    4: Altered Level of consciousness?  [ ] YES [x ] NO    The diagnosis of delirium by CAM requires the presence of features 1 and 2 and either 3 or 4.    PRESSORS: [ ] YES [ x] NO    Cardiovascular:  Heart Failure  Acute   Acute on Chronic  Chronic         Pulmonary:  albuterol    90 MICROgram(s) HFA Inhaler 2 Puff(s) Inhalation every 6 hours    Hematalogic:    Other:  buPROPion XL (24-Hour) . 150 milliGRAM(s) Oral daily  dextrose 5%. 1000 milliLiter(s) IV Continuous <Continuous>  dextrose 5%. 1000 milliLiter(s) IV Continuous <Continuous>  divalproex  milliGRAM(s) Oral two times a day  dorzolamide 2% Ophthalmic Solution 1 Drop(s) Right EYE <User Schedule>  finasteride 5 milliGRAM(s) Oral daily  nystatin Powder 1 Application(s) Topical two times a day  ondansetron Injectable 4 milliGRAM(s) IV Push every 8 hours PRN  pantoprazole  Injectable 40 milliGRAM(s) IV Push every 24 hours  sodium chloride 0.9% lock flush 10 milliLiter(s) IV Push every 1 hour PRN  sucralfate suspension 1 Gram(s) Oral four times a day  tamsulosin 0.4 milliGRAM(s) Oral at bedtime  traZODone 50 milliGRAM(s) Oral at bedtime  vitamin A &amp; D Ointment 1 Application(s) Topical at bedtime    albuterol    90 MICROgram(s) HFA Inhaler 2 Puff(s) Inhalation every 6 hours  buPROPion XL (24-Hour) . 150 milliGRAM(s) Oral daily  dextrose 5%. 1000 milliLiter(s) IV Continuous <Continuous>  dextrose 5%. 1000 milliLiter(s) IV Continuous <Continuous>  divalproex  milliGRAM(s) Oral two times a day  dorzolamide 2% Ophthalmic Solution 1 Drop(s) Right EYE <User Schedule>  finasteride 5 milliGRAM(s) Oral daily  nystatin Powder 1 Application(s) Topical two times a day  ondansetron Injectable 4 milliGRAM(s) IV Push every 8 hours PRN  pantoprazole  Injectable 40 milliGRAM(s) IV Push every 24 hours  sodium chloride 0.9% lock flush 10 milliLiter(s) IV Push every 1 hour PRN  sucralfate suspension 1 Gram(s) Oral four times a day  tamsulosin 0.4 milliGRAM(s) Oral at bedtime  traZODone 50 milliGRAM(s) Oral at bedtime  vitamin A &amp; D Ointment 1 Application(s) Topical at bedtime    Drug Dosing Weight  Height (cm): 172.7 (09 Jun 2023 21:00)  Weight (kg): 84.2 (09 Jun 2023 21:00)  BMI (kg/m2): 28.2 (09 Jun 2023 21:00)  BSA (m2): 1.98 (09 Jun 2023 21:00)    CENTRAL LINE: [ ] YES [x ] NO  LOCATION:   DATE INSERTED:  REMOVE: [ ] YES [ ] NO  EXPLAIN:    HATFIELD: [x ] YES [ ] NO    DATE INSERTED:  REMOVE:  [ ] YES [ ] NO  EXPLAIN:    PAST MEDICAL & SURGICAL HISTORY:  Hypothyroid      Hyperlipidemia      Ambulatory dysfunction      Anemia      History of BPH      CKD (chronic kidney disease)      Chronic obstructive pulmonary disease (COPD)      Bipolar disorder      HLD (hyperlipidemia)      BPH (benign prostatic hyperplasia)      Chronic diastolic congestive heart failure      Lymphedema      Chronic leg pain      No significant past surgical history      06-18 @ 07:01  -  06-19 @ 07:00  --------------------------------------------------------  IN: 0 mL / OUT: 600 mL / NET: -600 mL      PHYSICAL EXAM:    GENERAL: NAD, Anasarca  HEAD:  Atraumatic, Normocephalic  EYES:  PERRLA, conjunctiva and sclera clear  ENMT:  Moist mucous membranes  NECK: Supple  NERVOUS SYSTEM:  Alert & Oriented X 3, Good concentration; Moves upper exts.    CHEST/LUNG: Rhonchi lower lobes. b/l No rales, wheezing, or rubs On 2L NC.   HEART: Regular rate and rhythm; No murmurs, rubs, or gallops  ABDOMEN: Soft, tender to palpation, Obese. Bowel sounds present  EXTREMITIES:  2+ Peripheral Pulses, No clubbing, cyanosis, + 2 pitting edema Speedy. Anasarca.   SKIN: Sacral wound.      LABS:  CBC Full  -  ( 19 Jun 2023 06:03 )  WBC Count : 4.74 K/uL  RBC Count : 2.43 M/uL  Hemoglobin : 7.2 g/dL  Hematocrit : 23.9 %  Platelet Count - Automated : 38 K/uL  Mean Cell Volume : 98.4 fl  Mean Cell Hemoglobin : 29.6 pg  Mean Cell Hemoglobin Concentration : 30.1 gm/dL  Auto Neutrophil # : x  Auto Lymphocyte # : x  Auto Monocyte # : x  Auto Eosinophil # : x  Auto Basophil # : x  Auto Neutrophil % : x  Auto Lymphocyte % : x  Auto Monocyte % : x  Auto Eosinophil % : x  Auto Basophil % : x    06-19    145  |  114<H>  |  49<H>  ----------------------------<  85  3.4<L>   |  19<L>  |  5.17<H>    Ca    9.7      19 Jun 2023 06:03  Phos  4.3     06-19  Mg     2.1     06-19    TPro  5.0<L>  /  Alb  2.2<L>  /  TBili  0.6  /  DBili  x   /  AST  5<L>  /  ALT  7<L>  /  AlkPhos  81  06-19    PT/INR - ( 19 Jun 2023 06:03 )   PT: 13.7 sec;   INR: 1.15 ratio         PTT - ( 19 Jun 2023 06:03 )  PTT:36.9 sec      [ x ]  DVT Prophylaxis SCDs  [  ]  Tube feecing on hold 2/2 emesis.     RADIOLOGY & ADDITIONAL STUDIES:    < from: Xray Chest 1 View- PORTABLE-Urgent (Xray Chest 1 View- PORTABLE-Urgent .) (06.17.23 @ 18:48) >    ACC: [id number] EXAM:  XR CHEST PORTABLE URGENT 1V   ORDERED BY: FABRICE WORTHY     PROCEDURE DATE:  06/17/2023          INTERPRETATION:  CLINICAL STATEMENT: NG tube placement    TECHNIQUE: AP view of the chest.      COMPARISON: 6/14/2023    Frontal radiographs centered to the upper abdomen performed for   confirmation of NG tube position.    Distal NG tube seen upper abdomen, expected region of the stomach.    No significant interval change elevation/eventration right hemidiaphragm   with overlying;effusion, atelectasis and/or infiltrate.    IMPRESSION:    Distal NG tube left upper abdomen, expected region of the stomach.    --- End of Report ---      < end of copied text >      Plan of care discussed with intensivist and consultants.

## 2023-06-19 NOTE — PROGRESS NOTE ADULT - SUBJECTIVE AND OBJECTIVE BOX
Patient is a 63y old  Male who presents with a chief complaint of sepsis (19 Jun 2023 14:56)    PATIENT IS SEEN AND EXAMINED IN MEDICAL FLOOR.  DORIST [    ]    ALYSIA [   ]      GT [   ]    ALLERGIES:  No Known Allergies      Daily     Daily     VITALS:    Vital Signs Last 24 Hrs  T(C): 36.3 (19 Jun 2023 11:55), Max: 36.6 (19 Jun 2023 05:11)  T(F): 97.4 (19 Jun 2023 11:55), Max: 97.8 (19 Jun 2023 05:11)  HR: 87 (19 Jun 2023 11:55) (81 - 89)  BP: 99/54 (19 Jun 2023 11:55) (99/54 - 142/76)  BP(mean): 90 (19 Jun 2023 05:11) (90 - 90)  RR: 20 (19 Jun 2023 11:55) (19 - 20)  SpO2: 100% (19 Jun 2023 11:55) (98% - 100%)    Parameters below as of 19 Jun 2023 11:55  Patient On (Oxygen Delivery Method): nasal cannula  O2 Flow (L/min): 2      LABS:    CBC Full  -  ( 19 Jun 2023 06:03 )  WBC Count : 4.74 K/uL  RBC Count : 2.43 M/uL  Hemoglobin : 7.2 g/dL  Hematocrit : 23.9 %  Platelet Count - Automated : 38 K/uL  Mean Cell Volume : 98.4 fl  Mean Cell Hemoglobin : 29.6 pg  Mean Cell Hemoglobin Concentration : 30.1 gm/dL  Auto Neutrophil # : x  Auto Lymphocyte # : x  Auto Monocyte # : x  Auto Eosinophil # : x  Auto Basophil # : x  Auto Neutrophil % : x  Auto Lymphocyte % : x  Auto Monocyte % : x  Auto Eosinophil % : x  Auto Basophil % : x    PT/INR - ( 19 Jun 2023 06:03 )   PT: 13.7 sec;   INR: 1.15 ratio         PTT - ( 19 Jun 2023 06:03 )  PTT:36.9 sec  06-19    145  |  114<H>  |  49<H>  ----------------------------<  85  3.4<L>   |  19<L>  |  5.17<H>    Ca    9.7      19 Jun 2023 06:03  Phos  4.3     06-19  Mg     2.1     06-19    TPro  5.0<L>  /  Alb  2.2<L>  /  TBili  0.6  /  DBili  x   /  AST  5<L>  /  ALT  7<L>  /  AlkPhos  81  06-19    CAPILLARY BLOOD GLUCOSE            LIVER FUNCTIONS - ( 19 Jun 2023 06:03 )  Alb: 2.2 g/dL / Pro: 5.0 g/dL / ALK PHOS: 81 U/L / ALT: 7 U/L DA / AST: 5 U/L / GGT: x           Creatinine Trend: 5.17<--, 5.27<--, 5.17<--, 5.11<--, 5.02<--, 4.88<--  I&O's Summary    18 Jun 2023 07:01  -  19 Jun 2023 07:00  --------------------------------------------------------  IN: 0 mL / OUT: 600 mL / NET: -600 mL    19 Jun 2023 07:01  -  19 Jun 2023 15:47  --------------------------------------------------------  IN: 0 mL / OUT: 600 mL / NET: -600 mL            .Sputum Sputum  06-10 @ 10:23   Normal Respiratory Ivelisse present  --    Few polymorphonuclear leukocytes per low power field  Few Squamous epithelial cells per low power field  Rare Yeast like cells per oil power field  Few Gram Variable Rods per oil power field      .Blood Blood-Peripheral  06-10 @ 04:42   No Growth Final  --  --      .Blood Blood-Peripheral  06-10 @ 03:40   No Growth Final  --  --      .Blood Blood  06-01 @ 10:52   No Growth Final  --  --      .Blood Blood  06-01 @ 10:46   No Growth Final  --  --      .Blood Blood-Peripheral  05-28 @ 21:09   No Growth Final  --  Blood Culture PCR  Pseudomonas aeruginosa      Clean Catch Clean Catch (Midstream)  05-28 @ 21:02   >=3 organisms. Probable collection contamination.  --  --          MEDICATIONS:    MEDICATIONS  (STANDING):  albuterol    90 MICROgram(s) HFA Inhaler 2 Puff(s) Inhalation every 6 hours  buPROPion XL (24-Hour) . 150 milliGRAM(s) Oral daily  dextrose 5%. 1000 milliLiter(s) (50 mL/Hr) IV Continuous <Continuous>  dextrose 5%. 1000 milliLiter(s) (50 mL/Hr) IV Continuous <Continuous>  dorzolamide 2% Ophthalmic Solution 1 Drop(s) Right EYE <User Schedule>  finasteride 5 milliGRAM(s) Oral daily  nystatin Powder 1 Application(s) Topical two times a day  pantoprazole  Injectable 40 milliGRAM(s) IV Push every 24 hours  sucralfate suspension 1 Gram(s) Oral four times a day  tamsulosin 0.4 milliGRAM(s) Oral at bedtime  traZODone 50 milliGRAM(s) Oral at bedtime  vitamin A &amp; D Ointment 1 Application(s) Topical at bedtime      MEDICATIONS  (PRN):  ondansetron Injectable 4 milliGRAM(s) IV Push every 8 hours PRN Nausea and/or Vomiting  sodium chloride 0.9% lock flush 10 milliLiter(s) IV Push every 1 hour PRN Pre/post blood products, medications, blood draw, and to maintain line patency      REVIEW OF SYSTEMS:                           ALL ROS DONE [ X   ]    CONSTITUTIONAL:  LETHARGIC [   ], FEVER [   ], UNRESPONSIVE [   ]  CVS:  CP  [   ], SOB, [   ], PALPITATIONS [   ], DIZZYNESS [   ]  RS: COUGH [   ], SPUTUM [   ]  GI: ABDOMINAL PAIN [   ], NAUSEA [   ], VOMITINGS [   ], DIARRHEA [   ], CONSTIPATION [   ]  :  DYSURIA [   ], NOCTURIA [   ], INCREASED FREQUENCY [   ], DRIBLING [   ],  SKELETAL: PAINFUL JOINTS [   ], SWOLLEN JOINTS [   ], NECK ACHE [   ], LOW BACK ACHE [   ],  SKIN : ULCERS [   ], RASH [   ], ITCHING [   ]  CNS: HEAD ACHE [   ], DOUBLE VISION [   ], BLURRED VISION [   ], AMS / CONFUSION [   ], SEIZURES [   ], WEAKNESS [   ],TINGLING / NUMBNESS [   ]      PHYSICAL EXAMINATION:    GENERAL APPEARANCE: NO DISTRESS  HEENT:  NO PALLOR, NO  JVD,  NO   NODES, NECK SUPPLE  CVS: S1 +, S2 +,   RS: AEEB,  OCCASIONAL  RALES +,   NO RONCHI  ABD: SOFT, NT, NO, BS +  EXT: PE+  SKIN: WARM,   SKELETAL:  ROM REDUCED AT CERVICAL & LS SPINE  CNS:  AAO X 2-3      RADIOLOGY :    RADIOLOGY AND READINGS REVIEWED    ASSESSMENT :       PLAN:  HPI:  62 y/o M, A&Ox3, bedbound, chronic daley catheter with CKD (baseline creat 3.8), COPD, CHFpEF, BPH, PVD, Lymphedema, decubitus ulcers (Sacrum/gluteal) , Anemia, poly-neuropathy, poly-arthritis, hypothyroidism, seizures and Bipolar disorder was BIB EMS from [place] for abdominal pain, [place]. Mr. Rhoades states that he developed acute onset sharp abdominal pain 4 days ago a/w "brown" emesis (witnessed by EMS). Unable to tolerate food/drink x 4 days. Having normal bowel movements, last this morning 5/28. Denies any fevers. NH paperwork states concern for obstruction.  Patient endorses BLOODY EMESIS x 4 days upon further questioning. (29 May 2023 01:36)      # [6/9] - RRT CALLED FOR HYPOTENSION, FEVER AND ANEMIA - PATIENT GIVEN PRBC TRANSFUSION, VANCOMYCIN ADDED, TRANSFERRED FROM SCU TO ICU    # RESOLVING SEPSIS S/T P.AERUGINOSA BACTEREMIA + COMPLICATED UTI    - S/P ON CEFEPIME [AND VANCOMYCIN], BCX [P. AERUGINOSA] AND UCX [ > 3 ORGANISMS], REPEAT BCX - NGTD  - ID CONSULT  - CRITICAL CARE EVALUATION IN PROGRESS    - S/P VASOPRESSORS    # RESOLVING GI BLEED  # ESOPHAGITIS  # ANEMIA OF CHRONIC DISEASE   # THROMBOCYTOPENIA  - MONITOR HGB, TRANSFUSION THRESHOLD HGB < 8  - TREND PLT  - S/P EGD - ULCERATIVE ESOPHAGITIS  - PPI BID  - CARAFATE QID  - ADVANCING DIET PER GI RECOMMENDATION   - GI CONSULT  - HEME/ONC CONSULT    - [6/6] - EPISODE OF NAUSEA/VOMITING - MONITORING CLOSELY, PRN ANTIEMETICS    - [6/10] - GIVEN PRBC TRANSFUSION, PLANNED FOR 1 MORE UNIT OF PRBC, PLT AND FFP, DEPAKOTE STOPPED GIVEN THROMBOCYTOPENIA    - [6/12] - PLANNED FOR POSSIBLE EGD    - 6/12 - REPEAT EGD - IMPROVING ESOPHAGITIS, SHALLOW DIVERTICULUM IN DISTAL ESOPHAGUS, SINGLE INFLAMMATORY NODULE IN ANTRUM    - 6/17 - S/P PRBC TRANSFUSION     - 6/17 - NAUSEA - NGT PLACED, GI CONSULT IN PROGRESS, REGLAN AS TOLERATED GIVEN QTC    # ANEMIA DUE TO GI BLEED, ANEMIA OF CKD     # CHRONIC HYPOXIC RESPIRATORY FAILURE S/T UNDERLYING COPD  - ON PRN O2  - CRITICAL CARE EVALUATION IN PROGRESS    # THROMBOCYTOPENIA  - W/UP IN PROGRESS  - HEME/ONC CONSULT    - S/P PLT TRANSFUSION [6/17]    # AMS DUE TO ACUTE METABOLIC ENCEPHALOPATHY    # SHEYLA ON CKD STAGE 4  - ATN - WORSENING  # CHRONIC DALEY, BPH  - DALEY   - STRICT IS AND OS  - AVOID NEPHROTOXIC AGENTS  - MONITOR CR  - NEPHROLOGY CONSULT    - [6/7] - URINARY RETENTION OVERNIGHT - DALEY PLACED    - PATIENT IS AGREEABLE FOR HD IF REQUIRED    # AMBULATORY DYSFUNCTION, IMPAIRED GAIT DUE TO GENERALIZED MUSCLE WEAKNESS, CERVICAL & LS SPINAL STENOSIS, POLYARTRHITIS & PERIPHERAL NEUROPATHY. OP  - FALL PRECAUTIONS    # DYSPHAGIA DUE TO METABOLIC ENCEPHALOPATHY - ON MODIFIED DIET PER SWALLOW EVAL    # HX OF SEIZURE DX  - ON VALPROIC ACID [STOPPED GIVEN THROMBOCYTOPENIA]  - DEPAKOTE    # HYPOTHYROIDISM - ON LEVOTHYROXINE    # PRESSURE ULCERS  - PATIENT IS HIGH RISK FOR PRESSURE ULCERS DESPITE PREVENTIVE MEASURES IN PLACE  - WOUND CARE CONSULT    # BIPOLAR DISORDER - ON DEPAKOTE, WELLBUTRIN XL    # GI PPX   Patient is a 63y old  Male who presents with a chief complaint of sepsis (19 Jun 2023 14:56)    PATIENT IS SEEN AND EXAMINED IN SCU.    ALLERGIES:  No Known Allergies    VITALS:    Vital Signs Last 24 Hrs  T(C): 36.3 (19 Jun 2023 11:55), Max: 36.6 (19 Jun 2023 05:11)  T(F): 97.4 (19 Jun 2023 11:55), Max: 97.8 (19 Jun 2023 05:11)  HR: 87 (19 Jun 2023 11:55) (81 - 89)  BP: 99/54 (19 Jun 2023 11:55) (99/54 - 142/76)  BP(mean): 90 (19 Jun 2023 05:11) (90 - 90)  RR: 20 (19 Jun 2023 11:55) (19 - 20)  SpO2: 100% (19 Jun 2023 11:55) (98% - 100%)    Parameters below as of 19 Jun 2023 11:55  Patient On (Oxygen Delivery Method): nasal cannula  O2 Flow (L/min): 2      LABS:    CBC Full  -  ( 19 Jun 2023 06:03 )  WBC Count : 4.74 K/uL  RBC Count : 2.43 M/uL  Hemoglobin : 7.2 g/dL  Hematocrit : 23.9 %  Platelet Count - Automated : 38 K/uL  Mean Cell Volume : 98.4 fl  Mean Cell Hemoglobin : 29.6 pg  Mean Cell Hemoglobin Concentration : 30.1 gm/dL  Auto Neutrophil # : x  Auto Lymphocyte # : x  Auto Monocyte # : x  Auto Eosinophil # : x  Auto Basophil # : x  Auto Neutrophil % : x  Auto Lymphocyte % : x  Auto Monocyte % : x  Auto Eosinophil % : x  Auto Basophil % : x    PT/INR - ( 19 Jun 2023 06:03 )   PT: 13.7 sec;   INR: 1.15 ratio         PTT - ( 19 Jun 2023 06:03 )  PTT:36.9 sec  06-19    145  |  114<H>  |  49<H>  ----------------------------<  85  3.4<L>   |  19<L>  |  5.17<H>    Ca    9.7      19 Jun 2023 06:03  Phos  4.3     06-19  Mg     2.1     06-19    TPro  5.0<L>  /  Alb  2.2<L>  /  TBili  0.6  /  DBili  x   /  AST  5<L>  /  ALT  7<L>  /  AlkPhos  81  06-19    CAPILLARY BLOOD GLUCOSE            LIVER FUNCTIONS - ( 19 Jun 2023 06:03 )  Alb: 2.2 g/dL / Pro: 5.0 g/dL / ALK PHOS: 81 U/L / ALT: 7 U/L DA / AST: 5 U/L / GGT: x           Creatinine Trend: 5.17<--, 5.27<--, 5.17<--, 5.11<--, 5.02<--, 4.88<--  I&O's Summary    18 Jun 2023 07:01  -  19 Jun 2023 07:00  --------------------------------------------------------  IN: 0 mL / OUT: 600 mL / NET: -600 mL    19 Jun 2023 07:01  -  19 Jun 2023 15:47  --------------------------------------------------------  IN: 0 mL / OUT: 600 mL / NET: -600 mL            .Sputum Sputum  06-10 @ 10:23   Normal Respiratory Ivelisse present  --    Few polymorphonuclear leukocytes per low power field  Few Squamous epithelial cells per low power field  Rare Yeast like cells per oil power field  Few Gram Variable Rods per oil power field      .Blood Blood-Peripheral  06-10 @ 04:42   No Growth Final  --  --      .Blood Blood-Peripheral  06-10 @ 03:40   No Growth Final  --  --      .Blood Blood  06-01 @ 10:52   No Growth Final  --  --      .Blood Blood  06-01 @ 10:46   No Growth Final  --  --      .Blood Blood-Peripheral  05-28 @ 21:09   No Growth Final  --  Blood Culture PCR  Pseudomonas aeruginosa      Clean Catch Clean Catch (Midstream)  05-28 @ 21:02   >=3 organisms. Probable collection contamination.  --  --          MEDICATIONS:    MEDICATIONS  (STANDING):  albuterol    90 MICROgram(s) HFA Inhaler 2 Puff(s) Inhalation every 6 hours  buPROPion XL (24-Hour) . 150 milliGRAM(s) Oral daily  dextrose 5%. 1000 milliLiter(s) (50 mL/Hr) IV Continuous <Continuous>  dextrose 5%. 1000 milliLiter(s) (50 mL/Hr) IV Continuous <Continuous>  dorzolamide 2% Ophthalmic Solution 1 Drop(s) Right EYE <User Schedule>  finasteride 5 milliGRAM(s) Oral daily  nystatin Powder 1 Application(s) Topical two times a day  pantoprazole  Injectable 40 milliGRAM(s) IV Push every 24 hours  sucralfate suspension 1 Gram(s) Oral four times a day  tamsulosin 0.4 milliGRAM(s) Oral at bedtime  traZODone 50 milliGRAM(s) Oral at bedtime  vitamin A &amp; D Ointment 1 Application(s) Topical at bedtime      MEDICATIONS  (PRN):  ondansetron Injectable 4 milliGRAM(s) IV Push every 8 hours PRN Nausea and/or Vomiting  sodium chloride 0.9% lock flush 10 milliLiter(s) IV Push every 1 hour PRN Pre/post blood products, medications, blood draw, and to maintain line patency      REVIEW OF SYSTEMS:                           ALL ROS DONE [ X   ]    CONSTITUTIONAL:  LETHARGIC [   ], FEVER [   ], UNRESPONSIVE [   ]  CVS:  CP  [   ], SOB, [   ], PALPITATIONS [   ], DIZZYNESS [   ]  RS: COUGH [   ], SPUTUM [   ]  GI: ABDOMINAL PAIN [   ], NAUSEA [   ], VOMITINGS [   ], DIARRHEA [   ], CONSTIPATION [   ]  :  DYSURIA [   ], NOCTURIA [   ], INCREASED FREQUENCY [   ], DRIBLING [   ],  SKELETAL: PAINFUL JOINTS [   ], SWOLLEN JOINTS [   ], NECK ACHE [   ], LOW BACK ACHE [   ],  SKIN : ULCERS [   ], RASH [   ], ITCHING [   ]  CNS: HEAD ACHE [   ], DOUBLE VISION [   ], BLURRED VISION [   ], AMS / CONFUSION [   ], SEIZURES [   ], WEAKNESS [   ],TINGLING / NUMBNESS [   ]      PHYSICAL EXAMINATION:    GENERAL APPEARANCE: NO DISTRESS  HEENT:  NO PALLOR, NO  JVD,  NO   NODES, NECK SUPPLE  CVS: S1 +, S2 +,   RS: AEEB,  OCCASIONAL  RALES +,   NO RONCHI  ABD: SOFT, NT, NO, BS +  EXT: PE+  SKIN: WARM,   SKELETAL:  ROM REDUCED AT CERVICAL & LS SPINE  CNS:  AAO X 2-3      RADIOLOGY :    RADIOLOGY AND READINGS REVIEWED    ASSESSMENT :       PLAN:  HPI:  64 y/o M, A&Ox3, bedbound, chronic daley catheter with CKD (baseline creat 3.8), COPD, CHFpEF, BPH, PVD, Lymphedema, decubitus ulcers (Sacrum/gluteal) , Anemia, poly-neuropathy, poly-arthritis, hypothyroidism, seizures and Bipolar disorder was BIB EMS from [place] for abdominal pain, [place]. Mr. Rhoades states that he developed acute onset sharp abdominal pain 4 days ago a/w "brown" emesis (witnessed by EMS). Unable to tolerate food/drink x 4 days. Having normal bowel movements, last this morning 5/28. Denies any fevers. NH paperwork states concern for obstruction.  Patient endorses BLOODY EMESIS x 4 days upon further questioning. (29 May 2023 01:36)      # [6/9] - RRT CALLED FOR HYPOTENSION, FEVER AND ANEMIA - PATIENT GIVEN PRBC TRANSFUSION, VANCOMYCIN ADDED, TRANSFERRED FROM SCU TO ICU    # RESOLVING SEPSIS S/T P.AERUGINOSA BACTEREMIA + COMPLICATED UTI    - S/P ON CEFEPIME [AND VANCOMYCIN], BCX [P. AERUGINOSA] AND UCX [ > 3 ORGANISMS], REPEAT BCX - NGTD  - ID CONSULT  - CRITICAL CARE EVALUATION IN PROGRESS    - S/P VASOPRESSORS    # RESOLVING GI BLEED  # ESOPHAGITIS  # ANEMIA OF CHRONIC DISEASE   # THROMBOCYTOPENIA  - MONITOR HGB, TRANSFUSION THRESHOLD HGB < 8  - TREND PLT  - S/P EGD - ULCERATIVE ESOPHAGITIS  - PPI BID  - CARAFATE QID  - ADVANCING DIET PER GI RECOMMENDATION   - GI CONSULT  - HEME/ONC CONSULT    - [6/6] - EPISODE OF NAUSEA/VOMITING - MONITORING CLOSELY, PRN ANTIEMETICS    - [6/10] - GIVEN PRBC TRANSFUSION, PLANNED FOR 1 MORE UNIT OF PRBC, PLT AND FFP, DEPAKOTE STOPPED GIVEN THROMBOCYTOPENIA    - [6/12] - PLANNED FOR POSSIBLE EGD    - 6/12 - REPEAT EGD - IMPROVING ESOPHAGITIS, SHALLOW DIVERTICULUM IN DISTAL ESOPHAGUS, SINGLE INFLAMMATORY NODULE IN ANTRUM    - 6/17 - S/P PRBC TRANSFUSION     - 6/17 - NAUSEA - NGT PLACED, GI CONSULT IN PROGRESS, REGLAN AS TOLERATED GIVEN QTC    # ANEMIA DUE TO GI BLEED, ANEMIA OF CKD     # CHRONIC HYPOXIC RESPIRATORY FAILURE S/T UNDERLYING COPD  - ON PRN O2  - CRITICAL CARE EVALUATION IN PROGRESS    # THROMBOCYTOPENIA  - W/UP IN PROGRESS  - HEME/ONC CONSULT    - S/P PLT TRANSFUSION [6/17]    # AMS DUE TO ACUTE METABOLIC ENCEPHALOPATHY    # SHEYLA ON CKD STAGE 4  - ATN - WORSENING  # CHRONIC DALEY, BPH  - DALEY   - STRICT IS AND OS  - AVOID NEPHROTOXIC AGENTS  - MONITOR CR  - NEPHROLOGY CONSULT    - [6/7] - URINARY RETENTION OVERNIGHT - DALEY PLACED    - PATIENT IS AGREEABLE FOR HD IF REQUIRED    # AMBULATORY DYSFUNCTION, IMPAIRED GAIT DUE TO GENERALIZED MUSCLE WEAKNESS, CERVICAL & LS SPINAL STENOSIS, POLYARTRHITIS & PERIPHERAL NEUROPATHY. OP  - FALL PRECAUTIONS    # DYSPHAGIA DUE TO METABOLIC ENCEPHALOPATHY - ON MODIFIED DIET PER SWALLOW EVAL    # HX OF SEIZURE DX  - ON VALPROIC ACID [STOPPED GIVEN THROMBOCYTOPENIA]  - DEPAKOTE    # HYPOTHYROIDISM - ON LEVOTHYROXINE    # PRESSURE ULCERS  - PATIENT IS HIGH RISK FOR PRESSURE ULCERS DESPITE PREVENTIVE MEASURES IN PLACE  - WOUND CARE CONSULT    # BIPOLAR DISORDER - ON DEPAKOTE, WELLBUTRIN XL    # GI PPX

## 2023-06-19 NOTE — PROGRESS NOTE ADULT - NS ATTEND AMEND GEN_ALL_CORE FT
# THROMBOCYTOPENIA  most likely multifactorial  sepsis/polypharmacy = including abx and Depakote   noted  further decline  in plat count   HIT w/u was negative in the past  repeated DIC panel positive  last week , neg today with normal fibrinogen levels   SDP tx if platelet < 20 K( sepsis)  or bleeding  keep plat > 50 K for  invasive procedure  recommend  Cryo tx if  fibrinogen < 100-150  F/u CBC  avoid nonessential meds  consider changing Depakote to another medication, if no improvement off  meds consider further w/u with BMBX       #  NCNC ANEMIA  # SHEYLA on CKD  w/u c/w with anemia of chronic disease  f/u CBC, PRBC TX prn for HB<7   if persistent  CKD,  and EPO < 200  pt may benefit from SUREKHA inj  for h/h support   in the future   -MM w/u c/w MGUS, recommend observation for now   call with questions [phone]

## 2023-06-19 NOTE — PROGRESS NOTE ADULT - SUBJECTIVE AND OBJECTIVE BOX
NEPHROLOGY MEDICAL CARE, [facility] - Dr. Taj Valenzuela/ Dr. Hazel Solis/ Dr. Cosmo Root/ Dr. Fang Gifford    Date of Service: 06-19-23 @ 14:56    Patient was seen and examined at bedside.    CC: patient is okay and no uremic symptoms  pt able answer all questions.     Vital Signs Last 24 Hrs  T(C): 36.3 (19 Jun 2023 11:55), Max: 36.6 (19 Jun 2023 05:11)  T(F): 97.4 (19 Jun 2023 11:55), Max: 97.8 (19 Jun 2023 05:11)  HR: 87 (19 Jun 2023 11:55) (81 - 89)  BP: 99/54 (19 Jun 2023 11:55) (99/54 - 142/76)  BP(mean): 90 (19 Jun 2023 05:11) (90 - 90)  RR: 20 (19 Jun 2023 11:55) (19 - 20)  SpO2: 100% (19 Jun 2023 11:55) (98% - 100%)    Parameters below as of 19 Jun 2023 11:55  Patient On (Oxygen Delivery Method): nasal cannula  O2 Flow (L/min): 2      06-18 @ 07:01 - 06-19 @ 07:00  --------------------------------------------------------  IN: 0 mL / OUT: 600 mL / NET: -600 mL    06-19 @ 07:01  -  06-19 @ 14:56  --------------------------------------------------------  IN: 0 mL / OUT: 600 mL / NET: -600 mL        PHYSICAL EXAM:  GENERAL: No acute respiratory distress. Anascarca  HEAD:  Atraumatic, Normocephalic  EYES: conjunctiva and sclera clear  ENMT: Moist mucous membranes; NGT  NECK: Supple, No JVD  NERVOUS SYSTEM:  Awake, Alert & Oriented X3  CHEST/LUNG: b/l poor air entry No rales, rhonchi, wheezing  HEART: Regular rate and rhythm; No murmurs, rubs, or gallops  ABDOMEN: Soft, Non-tender, Nondistended; Bowel sounds present  : daley's cath with clear urine.  EXTREMITIES:  3+ pedal edema   SKIN: No rashes or lesions        MEDICATIONS:  MEDICATIONS  (STANDING):  albuterol    90 MICROgram(s) HFA Inhaler 2 Puff(s) Inhalation every 6 hours  buPROPion XL (24-Hour) . 150 milliGRAM(s) Oral daily  dextrose 5%. 1000 milliLiter(s) (50 mL/Hr) IV Continuous <Continuous>  dextrose 5%. 1000 milliLiter(s) (50 mL/Hr) IV Continuous <Continuous>  dorzolamide 2% Ophthalmic Solution 1 Drop(s) Right EYE <User Schedule>  finasteride 5 milliGRAM(s) Oral daily  nystatin Powder 1 Application(s) Topical two times a day  pantoprazole  Injectable 40 milliGRAM(s) IV Push every 24 hours  sucralfate suspension 1 Gram(s) Oral four times a day  tamsulosin 0.4 milliGRAM(s) Oral at bedtime  traZODone 50 milliGRAM(s) Oral at bedtime  vitamin A &amp; D Ointment 1 Application(s) Topical at bedtime    MEDICATIONS  (PRN):  ondansetron Injectable 4 milliGRAM(s) IV Push every 8 hours PRN Nausea and/or Vomiting  sodium chloride 0.9% lock flush 10 milliLiter(s) IV Push every 1 hour PRN Pre/post blood products, medications, blood draw, and to maintain line patency          LABS:                        7.2    4.74  )-----------( 38       ( 19 Jun 2023 06:03 )             23.9     06-19    145  |  114<H>  |  49<H>  ----------------------------<  85  3.4<L>   |  19<L>  |  5.17<H>    Ca    9.7      19 Jun 2023 06:03  Phos  4.3     06-19  Mg     2.1     06-19    TPro  5.0<L>  /  Alb  2.2<L>  /  TBili  0.6  /  DBili  x   /  AST  5<L>  /  ALT  7<L>  /  AlkPhos  81  06-19    PT/INR - ( 19 Jun 2023 06:03 )   PT: 13.7 sec;   INR: 1.15 ratio         PTT - ( 19 Jun 2023 06:03 )  PTT:36.9 sec    Magnesium, Serum: 2.1 mg/dL (06-19 @ 06:03)  Phosphorus Level, Serum: 4.3 mg/dL (06-19 @ 06:03)    Urine studies  Osmolality, Random Urine: 261 mos/kg (06-18 @ 01:40)  Sodium, Random Urine: 76 mmol/L (06-18 @ 01:40)  Creatinine, Random Urine: 30 mg/dL (06-18 @ 01:40)    PTH and Vit D:

## 2023-06-19 NOTE — BH CONSULTATION LIAISON PROGRESS NOTE - NSBHCHARTREVIEWVS_PSY_A_CORE FT
Vital Signs Last 24 Hrs  T(C): 36.3 (19 Jun 2023 11:55), Max: 36.6 (19 Jun 2023 05:11)  T(F): 97.4 (19 Jun 2023 11:55), Max: 97.8 (19 Jun 2023 05:11)  HR: 87 (19 Jun 2023 11:55) (81 - 89)  BP: 99/54 (19 Jun 2023 11:55) (99/54 - 142/76)  BP(mean): 90 (19 Jun 2023 05:11) (90 - 90)  RR: 20 (19 Jun 2023 11:55) (19 - 20)  SpO2: 100% (19 Jun 2023 11:55) (98% - 100%)    Parameters below as of 19 Jun 2023 11:55  Patient On (Oxygen Delivery Method): nasal cannula  O2 Flow (L/min): 2

## 2023-06-19 NOTE — CHART NOTE - NSCHARTNOTEFT_GEN_A_CORE
Patient is a 63M with a PMHx of chronic daley, obesity, HTN, HLD, PVD, CKD stage IV, seizures, anemia, BPH, lymphedema, hypothyroidism, HFpEF, ? COPD, decubitus ulcer, polyneuropathy, polyarthritis, bipolar disorder, who presented to the ED with abd pain, n/v, and brown emesis. GI was consulted for GIB.     EGD 5/30: ulcerative esophagitis  EGD 6/12: 95% prior esophagitis healed, inflammatory nodule in antrum, shallow diverticulum seen towards distal esophagus.     GI was re-engaged for ? repeat EGD iso anemia and drop in Hgb. Chart reviewed. Upon GI sign off on 6/13, patient's Hgb was 9.0, INR 1.51. Since then, patient has remained anemic but stable, Hgb 7.9 and this morning 7.2, no overt signs of bleeding. Plt 38, previously 46. Heme/onc following for anemia, thrombocytopenia, and DIC.     	- Conservative management  	- Heme/onc following, appreciate recs  	- Continue IV protonix 40mg daily, PO carafate 1g four times daily, and PRN IV Zofran 4mg q8h  	- Maintain active T&S, 2 large bore peripheral IVs, transfuse for goal Hgb >7 or if symptomatic  	- Trend H/H daily  	- Monitor for recurrent bleeding  	- No indication for acute GI intervention.    This note and its recommendations herein are preliminary until such time as cosigned by an attending.

## 2023-06-19 NOTE — PROGRESS NOTE ADULT - PROBLEM SELECTOR PLAN 11
Chronic Daley Failed multiple TOV.  C/w Flomax and finasteride when vomiting stops.  Monitor urine output  c/w daley care. From [facility]  SCDs for DVT prophylaxis.  PT rec TG.   Daily CBC ,CMP, coags, fibrinogen, d-dimer  No plans for HD at this time. Will re-eval.   s/p 1 PRBC and 1 unit pf platelet 6/17  s/p NG tube insertion 6/17 to right nare.   Dietitian consult.  Hem/Onc following.   Palliative following  Family meeting 6/20 at 10 am

## 2023-06-19 NOTE — PROGRESS NOTE ADULT - PROBLEM SELECTOR PLAN 3
S/P EGD  5/30 and 6/13  +Esophagitis  Zofran 4mg prn  Sucralfate 1Gm 4 times daily  Pantoprazole 40mg daily.  Gentle hydration  GI dr. Cheung  NGT in place 6/17 to right nare.   s/p N/V with formula contents 100ml 6/17, Feeding held in AM then restarted. last BM 6/16. give dulcolax supp. S/P EGD  5/30 and 6/13  +Esophagitis  Zofran 4mg prn  Sucralfate 1Gm 4 times daily  Pantoprazole 40mg daily.  GI dr. Cheung  NGT in place 6/17 to right nare.   s/p N/V 6/19  Held feeding tube  BM today

## 2023-06-19 NOTE — BH CONSULTATION LIAISON PROGRESS NOTE - CURRENT MEDICATION
MEDICATIONS  (STANDING):  albuterol    90 MICROgram(s) HFA Inhaler 2 Puff(s) Inhalation every 6 hours  buPROPion XL (24-Hour) . 150 milliGRAM(s) Oral daily  dextrose 5%. 1000 milliLiter(s) (50 mL/Hr) IV Continuous <Continuous>  dextrose 5%. 1000 milliLiter(s) (50 mL/Hr) IV Continuous <Continuous>  dorzolamide 2% Ophthalmic Solution 1 Drop(s) Right EYE <User Schedule>  finasteride 5 milliGRAM(s) Oral daily  nystatin Powder 1 Application(s) Topical two times a day  pantoprazole  Injectable 40 milliGRAM(s) IV Push every 24 hours  sucralfate suspension 1 Gram(s) Oral four times a day  tamsulosin 0.4 milliGRAM(s) Oral at bedtime  traZODone 50 milliGRAM(s) Oral at bedtime  vitamin A &amp; D Ointment 1 Application(s) Topical at bedtime    MEDICATIONS  (PRN):  ondansetron Injectable 4 milliGRAM(s) IV Push every 8 hours PRN Nausea and/or Vomiting  sodium chloride 0.9% lock flush 10 milliLiter(s) IV Push every 1 hour PRN Pre/post blood products, medications, blood draw, and to maintain line patency

## 2023-06-19 NOTE — BH CONSULTATION LIAISON PROGRESS NOTE - NSBHCHARTREVIEWLAB_PSY_A_CORE FT
CBC Full  -  ( 19 Jun 2023 06:03 )  WBC Count : 4.74 K/uL  RBC Count : 2.43 M/uL  Hemoglobin : 7.2 g/dL  Hematocrit : 23.9 %  Platelet Count - Automated : 38 K/uL  Mean Cell Volume : 98.4 fl  Mean Cell Hemoglobin : 29.6 pg  Mean Cell Hemoglobin Concentration : 30.1 gm/dL  Auto Neutrophil # : x  Auto Lymphocyte # : x  Auto Monocyte # : x  Auto Eosinophil # : x  Auto Basophil # : x  Auto Neutrophil % : x  Auto Lymphocyte % : x  Auto Monocyte % : x  Auto Eosinophil % : x  Auto Basophil % : x  06-19    145  |  114<H>  |  49<H>  ----------------------------<  85  3.4<L>   |  19<L>  |  5.17<H>    Ca    9.7      19 Jun 2023 06:03  Phos  4.3     06-19  Mg     2.1     06-19    TPro  5.0<L>  /  Alb  2.2<L>  /  TBili  0.6  /  DBili  x   /  AST  5<L>  /  ALT  7<L>  /  AlkPhos  81  06-19

## 2023-06-19 NOTE — PROGRESS NOTE ADULT - PROBLEM SELECTOR PLAN 2
Hx of GIB  S/P EGD X2  5/30 and 6/12. No active bleeding found  S/p 2U PRBCs on 6/10  Drop in H&H 6/17, s/p 1 U PRBC and 1 U Platelet, H/H improving  Drop in H&H 06/18 7.2/23.9   No s/s of bleeding   Monitor CBC Hx of GIB  S/P EGD X2  5/30 and 6/12. No active bleeding found  S/p 2U PRBCs on 6/10  Drop in H&H 6/17, s/p 1 U PRBC and 1 U Platelet, H/H improving  Drop in H&H 06/19 7.2/23.9  Plt 38  No s/s of bleeding   Monitor CBC

## 2023-06-19 NOTE — PROGRESS NOTE ADULT - PROBLEM SELECTOR PLAN 2
Patient with worsening SHEYLA and serum Creatinine, now approaching CKD V  He has extensive anasarca and audible pulmonary congestion on NG feeds.      Given his repeated preferences for full resuscitation and no limitation on medical interventions, including possible HD, a time limited trial of dialysis is reasonable and medically appropriate.    Please consider stopping NG feeds temporarily until fluid overload is improved  Further management as per Nephrology and AI team

## 2023-06-19 NOTE — PROGRESS NOTE ADULT - PROBLEM SELECTOR PLAN 4
Found to have ulcerative esophagitis on initial EGD of 5/30, repeat EGD on 6/12 showed 95% clinical resolution  Now with persistent nausea and vomiting, ? failed course of Reglan    Continue IV Protonix/carafate/IV Zofran PRN  Currently on NG feeds--recommend holding due to pulmonary congestion/fluid overload  In my medical opinion he would be a poor candidate for long term PEG/enteral feeds.

## 2023-06-19 NOTE — PROGRESS NOTE ADULT - PROBLEM SELECTOR PLAN 6
Extended dwell to RUE removed on 6/17 due to edema and redness to site. elevate ext.   US venous doppler (-) for DVT and thrombophlebitis.

## 2023-06-19 NOTE — CHART NOTE - NSCHARTNOTEFT_GEN_A_CORE
EVENT: anemia    OBJECTIVE:  Vital Signs Last 24 Hrs  T(C): 36.9 (19 Jun 2023 18:45), Max: 36.9 (19 Jun 2023 18:45)  T(F): 98.5 (19 Jun 2023 18:45), Max: 98.5 (19 Jun 2023 18:45)  HR: 90 (19 Jun 2023 18:45) (81 - 90)  BP: 99/47 (19 Jun 2023 18:45) (99/47 - 142/76)  BP(mean): 90 (19 Jun 2023 05:11) (90 - 90)  RR: 20 (19 Jun 2023 11:55) (19 - 20)  SpO2: 100% (19 Jun 2023 11:55) (98% - 100%)    Parameters below as of 19 Jun 2023 11:55  Patient On (Oxygen Delivery Method): nasal cannula  O2 Flow (L/min): 2      FOCUSED PHYSICAL EXAM:  GENERAL APPEARANCE: NO DISTRESS  HEENT:  NO PALLOR, NO  JVD,  NO   NODES, NECK SUPPLE  CVS: S1 +, S2 +,   RS: AEEB,  OCCASIONAL  RALES +,   NO RONCHI  ABD: SOFT, NT, NO, BS +  EXT: PE+  SKIN: WARM,   SKELETAL:  ROM REDUCED AT CERVICAL & LS SPINE  CNS:  AAO X 2-3    LABS:                        6.9    5.82  )-----------( x        ( 19 Jun 2023 20:30 )             23.0     06-19    145  |  114<H>  |  49<H>  ----------------------------<  85  3.4<L>   |  19<L>  |  5.17<H>    Ca    9.7      19 Jun 2023 06:03  Phos  4.3     06-19  Mg     2.1     06-19    TPro  5.0<L>  /  Alb  2.2<L>  /  TBili  0.6  /  DBili  x   /  AST  5<L>  /  ALT  7<L>  /  AlkPhos  81  06-19        ASSESSMENT:  anemia 2/2 GIB  Hgb 6.9  No active bleeding noted at this time    PLAN:   give 1 unit PRBC  monitor for bloody BM  F/U CBC post transfusion    FOLLOW UP / RESULT:

## 2023-06-19 NOTE — BH CONSULTATION LIAISON PROGRESS NOTE - NSBHCHARTREVIEWINVESTIGATE_PSY_A_CORE FT
< from: 12 Lead ECG (06.17.23 @ 10:48) >    Ventricular Rate 88 BPM    Atrial Rate 88 BPM    P-R Interval 140 ms    QRS Duration 138 ms    Q-T Interval 410 ms    QTC Calculation(Bazett) 496 ms    P Axis 25 degrees    R Axis -59 degrees    T Axis 1 degrees    Diagnosis Line *** Poor data quality, interpretation may be adversely affected  Normal sinus rhythm  Right bundle branch block  Left anterior fascicular block  *** Bifascicular block ***  Possible Lateral infarct , age undetermined  Abnormal ECG    Confirmed by Alia Hamm ([id number]) on 6/18/2023 3:07:57 PM    < end of copied text >

## 2023-06-19 NOTE — PROGRESS NOTE ADULT - ASSESSMENT
63M, from [place], AOx3, bedbound, chronic FC, h/o obesity, HTN, HLD, PVD, Seizures, CKD (base SCr 3.8), anemia, BPH, Lymphedema, hypothyroidism, HFpEF, COPD, decubitus ulcer (sacrum/gluteal), polyneuropathy, polyarthritis, bipolar disorder, p/w abd pain a/w "brown” emesis and food intolerance x4d. Admitted for complicated UTI + suspected GIB.  Patient initially had BP of 88/62 but it improved with IVF. Patient admitted to poor oral intake for the past 4 days due to nausea and vomiting.  CT head was negative for acute changes. CT abd showed circumferential wall thickening of distal esophagus. PPI was started.  Patient complains of persistent nausea without vomiting. Reglan was started TID to improve GI motility. Clear liquid diet was started. Hemoglobin was stable around his baseline ~9. Pt had thrombocytopenia with concern for cefepime induced thrombobytopenia.  We are following daily d-dimers and dribrinogen levels with plan to transfuse 10units cryo if PLTs < 100 or if PLTs < 150 with bleeding. PLTs have been stable in 70-50's and Heparin subQ was started for DVT prophylaxis.    Patient had chronic daley from Dec 2022 and he failed TOV. UA was positive. Patient was started on Cefepime. Blood cultures and urine cultures were sent.  Wound care was consulted for sacral pressure ulcer.    Patient presented with BUN/Cr 103/4.98 (Baseline Cr 3.6~3.8). Patient received IVF and SCr has been slowly improving. Dr. Valenzuela nephrologist is on board.    Patient presented with Hypokalemia with K 3.1->2.8, likely due to vomiting and decreased PO intake. It was repleted IV and oral.    06/16: Thrombocytopenia improving. S/p Cryo. Hypernatremic with worsening SHEYLA on CKD. Likely pre-renal. Discussed with Nephrology. Albumin and light hydration. Anasarca. Extended dwell to RUE with redness and swelling around IV site. Afebrile.   S/p ABX.     06/17: Plt blue top 30 and drop in H&H. patient pale and lethargic. One unit of Platelet and 1 PRCBC ordered. Plan for NG tube insertion post platelet transfusion. Hypotensive and tachycardic overnight x 1 event. Improvement this AM. EKG noted. RUE venous doppler US negative for DVT. Hypernatremic with slight increase in SCr. Nephro following. Will start free water flushes once NG tube in. Dietitian consult for enteral feeding assessment and recommendations.     6/18: more alert and awake at baseline. s/p vomiting about 100ml with c/o nausea. s/p zofran. last BM 6/16, ordered dulcolax supp.  Hypernatremia improving. feeding held x few hours then resumed.      63M, from [place], AOx3, bedbound, chronic FC, h/o obesity, HTN, HLD, PVD, Seizures, CKD (base SCr 3.8), anemia, BPH, Lymphedema, hypothyroidism, HFpEF, COPD, decubitus ulcer (sacrum/gluteal), polyneuropathy, polyarthritis, bipolar disorder, p/w abd pain a/w "brown” emesis and food intolerance x4d. Admitted for complicated UTI + suspected GIB.  Patient initially had BP of 88/62 but it improved with IVF. Patient admitted to poor oral intake for the past 4 days due to nausea and vomiting.  CT head was negative for acute changes. CT abd showed circumferential wall thickening of distal esophagus. PPI was started.  Patient complains of persistent nausea without vomiting. Reglan was started TID to improve GI motility. Clear liquid diet was started. Hemoglobin was stable around his baseline ~9. Pt had thrombocytopenia with concern for cefepime induced thrombobytopenia.  We are following daily d-dimers and dribrinogen levels with plan to transfuse 10units cryo if PLTs < 100 or if PLTs < 150 with bleeding. PLTs have been stable in 70-50's and Heparin subQ was started for DVT prophylaxis.    Patient had chronic daley from Dec 2022 and he failed TOV. UA was positive. Patient was started on Cefepime. Blood cultures and urine cultures were sent.  Wound care was consulted for sacral pressure ulcer.    Patient presented with BUN/Cr 103/4.98 (Baseline Cr 3.6~3.8). Patient received IVF and SCr has been slowly improving. Dr. Valenzuela nephrologist is on board.    Patient presented with Hypokalemia with K 3.1->2.8, likely due to vomiting and decreased PO intake. It was repleted IV and oral.    06/16: Thrombocytopenia improving. S/p Cryo. Hypernatremic with worsening SHEYLA on CKD. Likely pre-renal. Discussed with Nephrology. Albumin and light hydration. Anasarca. Extended dwell to RUE with redness and swelling around IV site. Afebrile.   S/p ABX.     06/17: Plt blue top 30 and drop in H&H. patient pale and lethargic. One unit of Platelet and 1 PRCBC ordered. Plan for NG tube insertion post platelet transfusion. Hypotensive and tachycardic overnight x 1 event. Improvement this AM. EKG noted. RUE venous doppler US negative for DVT. Hypernatremic with slight increase in SCr. Nephro following. Will start free water flushes once NG tube in. Dietitian consult for enteral feeding assessment and recommendations.     6/18: more alert and awake at baseline. s/p vomiting about 100ml with c/o nausea. s/p zofran. last BM 6/16, ordered dulcolax supp.  Hypernatremia improving. feeding held x few hours then resumed.     06/19: Emesis x 1. Feeding tube held. Reglan. Afebrile. Chest congestion. 3% Sodium chloride inhaler and chest PT. CXR noted and discussed findings with intensivist. Lasix held 2/2 soft bp. Discussed with Nephro. No plan for HD at this time. Thrombocytopenia worsening Discussed wit Hem/Onc. Concern that it could be drug induced (Depakote). Discussed with Psych and Depakote discontinued. Psych re-eval for capacity. Updated patient's brother on clinical condition.

## 2023-06-19 NOTE — PROGRESS NOTE ADULT - SUBJECTIVE AND OBJECTIVE BOX
follow up on:  complex medical decision making related to goals of care    [facility] Geriatric and Palliative Consult Service:  Paradise Dianna DO: cell [phone])  Curt Palmer MD: cell ([phone])  Bubba Kelly NP: cell ([phone])   Renay Andrew DNP: cell ([phone])  Avery Santiago LMSW: cell ([phone])   Angela King NP: via [employer] Teams    Can contact any Palliative Team member via [employer] Teams for consults and questions      INTERIM HISTORY;  On 6/9 patient developed worsening confusion and disorientation--then RRT called for hypotension, fever, and anemia, patient transferred back to ICU with concern for evolving septic vs. hypovolemic shock.  Started back on Levophed, antibiotics continued.  Repeat blood cultures from 6/10 have remained NGTD.  Transfused 2 units PRBCs for Hgb of 5.3.  Underwent repeat EGD on 6/12 which showed significant healing (95%) of prior esophagitis with no active bleeding.  Mental status slowly improved towards baseline, patient downgraded back to AI unit on 6/14.  Hospital course further complicated by worsening anemia and thrombocytopenia, HIT panel negative but repeat DIC panel +, received 5 units of cryoprecipitate on 6/15, and 1 unit platelets/1 unit PRBCs on 6/17.  Also developed persistent nausea and vomiting, ? poorly responsive to IV Reglan, NGT inserted on 5/17 for persistent symptoms, also started on NG feeds.  Heme/Onc consulted, thrombocytopenia felt to be multifactorial in nature, Depakote now discontinued.  Has continued severe SHEYLA on CKD without resolution, serum Cr remains > 5 (5.17 this AM 6/19 with eGFR of 12).      Patient examined at bedside this afternoon.  Generally alert and oriented x 2, responsive to questions.  Denies acute pain, chest pain, or SOB, but with noted audible congestion.  Otherwise has no acute complaints.      Present Symptoms: Mild, Moderate, Severe  Pain:             Location -   Denies presently                             Aggravating factors -             Quality -             Radiation -             Timing-             Severity (0-10 scale):  0/10             Minimal acceptable level (0-10 scale):  Fatigue:  Mild  Nausea:  Denies  Lack of Appetite:   SOB:  Denies  Depression:  Anxiety:  Review of Systems:  All other systems reviewed and are negative      MEDICATIONS  (STANDING):  albuterol    90 MICROgram(s) HFA Inhaler 2 Puff(s) Inhalation every 6 hours  buPROPion XL (24-Hour) . 150 milliGRAM(s) Oral daily  dextrose 5%. 1000 milliLiter(s) (50 mL/Hr) IV Continuous <Continuous>  dextrose 5%. 1000 milliLiter(s) (50 mL/Hr) IV Continuous <Continuous>  dorzolamide 2% Ophthalmic Solution 1 Drop(s) Right EYE <User Schedule>  epoetin madyson (PROCRIT) Injectable 04454 Unit(s) SubCutaneous <User Schedule>  finasteride 5 milliGRAM(s) Oral daily  nystatin Powder 1 Application(s) Topical two times a day  pantoprazole  Injectable 40 milliGRAM(s) IV Push every 24 hours  sodium chloride 0.9% for Nebulization 3 milliLiter(s) Nebulizer every 6 hours  sucralfate suspension 1 Gram(s) Oral four times a day  tamsulosin 0.4 milliGRAM(s) Oral at bedtime  traZODone 50 milliGRAM(s) Oral at bedtime  vitamin A &amp; D Ointment 1 Application(s) Topical at bedtime    MEDICATIONS  (PRN):  ondansetron Injectable 4 milliGRAM(s) IV Push every 8 hours PRN Nausea and/or Vomiting  sodium chloride 0.9% lock flush 10 milliLiter(s) IV Push every 1 hour PRN Pre/post blood products, medications, blood draw, and to maintain line patency      PHYSICAL EXAM:  Vital Signs Last 24 Hrs  T(C): 36.9 (19 Jun 2023 18:45), Max: 36.9 (19 Jun 2023 18:45)  T(F): 98.5 (19 Jun 2023 18:45), Max: 98.5 (19 Jun 2023 18:45)  HR: 90 (19 Jun 2023 18:45) (87 - 90)  BP: 99/47 (19 Jun 2023 18:45) (99/47 - 142/76)  BP(mean): 90 (19 Jun 2023 05:11) (90 - 90)  RR: 20 (19 Jun 2023 11:55) (19 - 20)  SpO2: 100% (19 Jun 2023 11:55) (98% - 100%)    Parameters below as of 19 Jun 2023 11:55  Patient On (Oxygen Delivery Method): nasal cannula  O2 Flow (L/min): 2      General: alert  oriented x __2__   mild temporal wasting, NAD    Palliative Performance Scale/Karnofsky Score:  30%  http://npcrc.org/files/news/palliative_performance_scale_[email or web address].pdf    HEENT:  EOMI, anicteric, + NGT in place, pharynx clear  Lungs: ++ audible congestion, lungs otherwise clear to ausculation, respirations unlabored  CV: RRR,  normal S1 and S2, no murmur  GI: soft non distended non tender, + normal bowel sounds  : incontinent  + Viera cath in place  Musculoskeletal: weakness x4 in all extremities, bedbound, 2+ LE edema/generalized anasarca present  Skin:  Multiple stage I DU of bilateral heels, bilateral gluteal areas, and coccyx; stage II DU to R gluteus muscle (now healed)  Neuro: no focal deficits, + mild cognitive impairment (likely 2/2 ongoing metabolic encephalopathy)  Oral intake ability: unable/mouth care only    LABS:                          6.9    5.82  )-----------( 44       ( 19 Jun 2023 20:30 )             23.0     06-19    145  |  114<H>  |  49<H>  ----------------------------<  85  3.4<L>   |  19<L>  |  5.17<H>    Ca    9.7      19 Jun 2023 06:03  Phos  4.3     06-19  Mg     2.1     06-19    TPro  5.0<L>  /  Alb  2.2<L>  /  TBili  0.6  /  DBili  x   /  AST  5<L>  /  ALT  7<L>  /  AlkPhos  81  06-19        RADIOLOGY & ADDITIONAL STUDIES: follow up on:  complex medical decision making related to goals of care    [facility] Geriatric and Palliative Consult Service:  Paradise Bustamante DO: cell ([phone])  Curt Palmer MD: cell ([phone])  Bubba Kelly NP: cell ([phone])   Renay Andrew DNP: cell ([phone])  Avery Santiago LMSW: cell ([phone])   Angela King NP: via [employer] Teams    Can contact any Palliative Team member via [employer] Teams for consults and questions      INTERIM HISTORY:  On 6/9 patient developed worsening confusion and disorientation--then RRT called for hypotension, fever, and anemia, patient transferred back to ICU with concern for evolving septic vs. hypovolemic shock.  Started back on Levophed, antibiotics continued.  Repeat blood cultures from 6/10 have remained NGTD.  Transfused 2 units PRBCs for Hgb of 5.3.  Underwent repeat EGD on 6/12 which showed significant healing (95%) of prior esophagitis with no active bleeding.  Mental status slowly improved towards baseline, patient downgraded back to AI unit on 6/14.  Hospital course further complicated by worsening anemia and thrombocytopenia, HIT panel negative but repeat DIC panel +, received 5 units of cryoprecipitate on 6/15, and 1 unit platelets/1 unit PRBCs on 6/17.  Also developed persistent nausea and vomiting, ? poorly responsive to IV Reglan, NGT inserted on 5/17 for persistent symptoms, also started on NG feeds.  Heme/Onc consulted, thrombocytopenia felt to be multifactorial in nature, Depakote now discontinued.  Has continued severe SHEYLA on CKD without resolution, serum Cr remains > 5 (5.17 this AM 6/19 with eGFR of 12).  Palliative care service requested to reconsult for additional decision making and GOC discussion in context of worsening clinical condition.        Patient examined at bedside this afternoon.  Generally alert and oriented x 2, responsive to questions.  Denies acute pain, chest pain, or SOB, but with noted audible congestion.  Otherwise has no acute complaints.      Present Symptoms: Mild, Moderate, Severe  Pain:             Location -   Denies presently                             Aggravating factors -             Quality -             Radiation -             Timing-             Severity (0-10 scale):  0/10             Minimal acceptable level (0-10 scale):  Fatigue:  Mild  Nausea:  Denies  Lack of Appetite:   SOB:  Denies  Depression:  Anxiety:  Review of Systems:  All other systems reviewed and are negative      MEDICATIONS  (STANDING):  albuterol    90 MICROgram(s) HFA Inhaler 2 Puff(s) Inhalation every 6 hours  buPROPion XL (24-Hour) . 150 milliGRAM(s) Oral daily  dextrose 5%. 1000 milliLiter(s) (50 mL/Hr) IV Continuous <Continuous>  dextrose 5%. 1000 milliLiter(s) (50 mL/Hr) IV Continuous <Continuous>  dorzolamide 2% Ophthalmic Solution 1 Drop(s) Right EYE <User Schedule>  epoetin madyson (PROCRIT) Injectable 27552 Unit(s) SubCutaneous <User Schedule>  finasteride 5 milliGRAM(s) Oral daily  nystatin Powder 1 Application(s) Topical two times a day  pantoprazole  Injectable 40 milliGRAM(s) IV Push every 24 hours  sodium chloride 0.9% for Nebulization 3 milliLiter(s) Nebulizer every 6 hours  sucralfate suspension 1 Gram(s) Oral four times a day  tamsulosin 0.4 milliGRAM(s) Oral at bedtime  traZODone 50 milliGRAM(s) Oral at bedtime  vitamin A &amp; D Ointment 1 Application(s) Topical at bedtime    MEDICATIONS  (PRN):  ondansetron Injectable 4 milliGRAM(s) IV Push every 8 hours PRN Nausea and/or Vomiting  sodium chloride 0.9% lock flush 10 milliLiter(s) IV Push every 1 hour PRN Pre/post blood products, medications, blood draw, and to maintain line patency      PHYSICAL EXAM:  Vital Signs Last 24 Hrs  T(C): 36.9 (19 Jun 2023 18:45), Max: 36.9 (19 Jun 2023 18:45)  T(F): 98.5 (19 Jun 2023 18:45), Max: 98.5 (19 Jun 2023 18:45)  HR: 90 (19 Jun 2023 18:45) (87 - 90)  BP: 99/47 (19 Jun 2023 18:45) (99/47 - 142/76)  BP(mean): 90 (19 Jun 2023 05:11) (90 - 90)  RR: 20 (19 Jun 2023 11:55) (19 - 20)  SpO2: 100% (19 Jun 2023 11:55) (98% - 100%)    Parameters below as of 19 Jun 2023 11:55  Patient On (Oxygen Delivery Method): nasal cannula  O2 Flow (L/min): 2      General: alert  oriented x __2__   mild temporal wasting, NAD    Palliative Performance Scale/Karnofsky Score:  30%  [email or web address]_[email or web address]    HEENT:  EOMI, anicteric, + NGT in place, pharynx clear  Lungs: ++ audible congestion, lungs otherwise clear to ausculation, respirations unlabored  CV: RRR,  normal S1 and S2, no murmur  GI: soft non distended non tender, + normal bowel sounds  : incontinent  + Viera cath in place  Musculoskeletal: weakness x4 in all extremities, bedbound, 2+ LE edema/generalized anasarca present  Skin:  Multiple stage I DU of bilateral heels, bilateral gluteal areas, and coccyx; stage II DU to R gluteus muscle (now healed)  Neuro: no focal deficits, + mild cognitive impairment (likely 2/2 ongoing metabolic encephalopathy)  Oral intake ability: unable/mouth care only    LABS:                          6.9    5.82  )-----------( 44       ( 19 Jun 2023 20:30 )             23.0     06-19    145  |  114<H>  |  49<H>  ----------------------------<  85  3.4<L>   |  19<L>  |  5.17<H>    Ca    9.7      19 Jun 2023 06:03  Phos  4.3     06-19  Mg     2.1     06-19    TPro  5.0<L>  /  Alb  2.2<L>  /  TBili  0.6  /  DBili  x   /  AST  5<L>  /  ALT  7<L>  /  AlkPhos  81  06-19        RADIOLOGY & ADDITIONAL STUDIES:

## 2023-06-19 NOTE — PROGRESS NOTE ADULT - PROBLEM SELECTOR PLAN 3
Due to CAUTI with Pseudomonas bacteremia.    Repeat blood cultures from 6/10 have remained NGTD  Clinically resolved, completed antibiotics 6/12

## 2023-06-19 NOTE — BH CONSULTATION LIAISON PROGRESS NOTE - NSICDXBHSECONDARYDX_PSY_ALL_CORE
GIB (gastrointestinal bleeding)   K92.2  Sepsis   A41.9  Anemia   D64.9  Hypothyroid   E03.9  BPH (benign prostatic hyperplasia)   N40.0  Bipolar disorder   F31.9  Advance care planning   Z71.89  Seizure   R56.9  Esophagitis   K20.90  Hypokalemia   E87.6  Ambulatory dysfunction   R26.2  Prophylactic measure   Z29.9  Electrolyte depletion   E87.8

## 2023-06-19 NOTE — PROGRESS NOTE ADULT - PROBLEM SELECTOR PLAN 7
Has received 2U PRBC on 6/10  No evidence of active bleeding.  transfuse for Hgb < 7.0 or symptomatic.   Heme/onc QMA following  GI dr. Cheung  Patient was pale and hypotensive, lethargic 6/17.   s/p 1 PRBC 6/17 for Hgb of 7.4  Trend H&H Has received 2U PRBC on 6/10  No evidence of active bleeding.  transfuse for Hgb < 7.0 or symptomatic.   Heme/onc QMA following  GI dr. Cheung  Patient was pale and hypotensive, lethargic 6/17.   s/p 1 PRBC 6/17 for Hgb of 7.4  Hbg today 7.2 however no plan for transfusion today.   Trend H&H

## 2023-06-19 NOTE — PROGRESS NOTE ADULT - PROBLEM SELECTOR PLAN 7
Clinical evidence indicates that the patient has at least moderate protein calorie malnutrition  In the context of     Chronic Illness (>1 month), as evidenced by    Energy/Food intake <50% of estimated energy requirement >5 days  Weight loss: Moderate - severe  Body Fat loss: Severe   (mild temporal wasting)  Muscle mass loss: Severe  (Skin failure/ multiple pressure ulcers), albumin 2.2  Fluid Accumulation: Severe (Fluid overload, ++ anasarca, pulmonary congestion)   Strength: weakened severe (bedbound)    Recommend:  Currently remains NPO, on NG feeds  Strict aspiration precautions, keep head of bed elevated at all times  Patient is a poor candidate for a PEG tube/long term enteral feeds, and per initial GOC conversation on 5/30 had stated that he would not want a long term feeding tube

## 2023-06-19 NOTE — PROGRESS NOTE ADULT - PROBLEM SELECTOR PLAN 8
Patient with worsening SHEYLA on CKD, functional quadriplegia, and multiple medical comorbidities.  He is at high risk of increased morbidity and mortality, including further functional decline, worsening skin failure, recurrent infections, further decline in renal and cardiac status, and recurrent hospitalizations (patient already with multiple hospital admits since 2021). He now has an eGFR of 12 with anasarca/fluid overload and will likely require dialysis in the very near future.  He is at increased risk of death over the next 6-12 months and would be low threshold for hospice, depending upon his future clinical course.  Give his multiple medical comorbidities, he would be unlikely to have a good functional outcome in the event of in-hospital cardiac arrest requiring CPR.     consult greatly appreciated.  Even though patient's understanding into his overall prognosis may be limited/vague, he has consistently stated that he wants everything done to save his life, including dialysis and full CPR.      See GOC conversation above--patient remains clear that he wishes to remain Full Code and to proceed with HD if clinically indicated.  Given his consistently expressed wishes and his medical acuity, a time limited trial of dialysis would be medically appropriate.      Recommend holding NGT feeds temporarily due to fluid overload  Consider urgent HD  Remainder of medical management as per AI team  Agree that patient and family would benefit from additional discussions about GOC/disease trajectory.  Will continue attempts to contact/facilitate family meeting with brother.    Palliative care team will continue to follow.

## 2023-06-20 NOTE — PROGRESS NOTE ADULT - SUBJECTIVE AND OBJECTIVE BOX
follow up on:  complex medical decision making related to goals of care    [facility] Geriatric and Palliative Consult Service:  Paradise Bustamante DO: cell ([phone])  Curt Palmer MD: cell ([phone])  Bubba Kelly NP: cell ([phone])   Renay Morales DNP: cell ([phone])  Avery Henderson LMSW: cell ([phone])   Angela King NP: via [employer]    Can contact any Palliative Team member via [employer] for consults and questions      OVERNIGHT EVENTS:    Present Symptoms: Mild, Moderate, Severe  Pain:             Location -                               Aggravating factors -             Quality -             Radiation -             Timing-             Severity (0-10 scale):             Minimal acceptable level (0-10 scale):  Fatigue:  Nausea:  Lack of Appetite:   SOB:  Depression:  Anxiety:  Review of Systems: [All others negative or Unable to obtain due to poor mentation]    CPOT:    https://www.[email or web address]/getattachment/[email or web address]/Critical-Care-Pain-Observation-Tool-(CPOT)  PAIN AD Score:   [email or web address]/[email or web address] (press ctrl +  left click to view)MEDICATIONS  (STANDING):  acetaminophen   IVPB .. 1000 milliGRAM(s) IV Intermittent once  acetylcysteine 20%  Inhalation 4 milliLiter(s) Inhalation every 8 hours  albumin human 25% IVPB 50 milliLiter(s) IV Intermittent every 8 hours  albuterol    0.083% 2.5 milliGRAM(s) Nebulizer every 6 hours  albuterol    90 MICROgram(s) HFA Inhaler 2 Puff(s) Inhalation every 6 hours  buPROPion XL (24-Hour) . 150 milliGRAM(s) Oral daily  dextrose 5%. 1000 milliLiter(s) (50 mL/Hr) IV Continuous <Continuous>  dextrose 5%. 1000 milliLiter(s) (50 mL/Hr) IV Continuous <Continuous>  dorzolamide 2% Ophthalmic Solution 1 Drop(s) Right EYE <User Schedule>  epoetin madyson (PROCRIT) Injectable 90777 Unit(s) SubCutaneous <User Schedule>  finasteride 5 milliGRAM(s) Oral daily  nystatin Powder 1 Application(s) Topical two times a day  pantoprazole  Injectable 40 milliGRAM(s) IV Push every 24 hours  sodium chloride 0.9% for Nebulization 3 milliLiter(s) Nebulizer every 6 hours  sucralfate suspension 1 Gram(s) Oral four times a day  tamsulosin 0.4 milliGRAM(s) Oral at bedtime  traZODone 50 milliGRAM(s) Oral at bedtime  vitamin A &amp; D Ointment 1 Application(s) Topical at bedtime    MEDICATIONS  (PRN):  ondansetron Injectable 4 milliGRAM(s) IV Push every 8 hours PRN Nausea and/or Vomiting  sodium chloride 0.9% lock flush 10 milliLiter(s) IV Push every 1 hour PRN Pre/post blood products, medications, blood draw, and to maintain line patency      PHYSICAL EXAM:  Vital Signs Last 24 Hrs  T(C): 36.4 (20 Jun 2023 13:29), Max: 36.9 (19 Jun 2023 18:45)  T(F): 97.5 (20 Jun 2023 13:29), Max: 98.5 (19 Jun 2023 18:45)  HR: 90 (20 Jun 2023 13:29) (85 - 92)  BP: 115/59 (20 Jun 2023 13:29) (91/49 - 130/89)  BP(mean): 95 (20 Jun 2023 01:15) (95 - 97)  RR: 19 (20 Jun 2023 13:29) (16 - 20)  SpO2: 98% (20 Jun 2023 13:29) (97% - 100%)    Parameters below as of 20 Jun 2023 13:29  Patient On (Oxygen Delivery Method): nasal cannula  O2 Flow (L/min): 2      General: alert  oriented x ____    lethargic distressed cachexia  verbal nonverbal  unarousable     Palliative Performance Scale/Karnofsky Score:  http://npcrc.org/files/news/palliative_performance_scale_ppsv2.pdf    HEENT: no abnormal lesion, dry mouth  ET tube/trach oral lesions:  Lungs: tachypnea/labored breathing, audible excessive secretions  CV: RRR, S1S2, tachycardia  GI: soft non distended non tender  incontinent               PEG/NG/OG tube  constipation  last BM:   : incontinent  oliguria/anuria  daley  Musculoskeletal: weakness x4 edema x4    ambulatory with assistance   mostly/fully bedbound/wheelchair bound  Skin: no abnormal skin lesions, poor skin turgor, pressure ulcers stage:   Neuro: no deficits, mild cognitive impairment dsyphagia/dysarthria paresis  Oral intake ability: unable/only mouth care, minimal moderate full capability    LABS:                          8.3    6.50  )-----------( 43       ( 20 Jun 2023 07:08 )             27.3     06-20    145  |  115<H>  |  51<H>  ----------------------------<  85  3.6   |  18<L>  |  5.41<H>    Ca    9.9      20 Jun 2023 07:08  Phos  3.6     06-20  Mg     2.1     06-20    TPro  4.9<L>  /  Alb  2.0<L>  /  TBili  0.7  /  DBili  x   /  AST  5<L>  /  ALT  8<L>  /  AlkPhos  92  06-20        RADIOLOGY & ADDITIONAL STUDIES: follow up on:  complex medical decision making related to goals of care    [facility] Geriatric and Palliative Consult Service:  Paradise Bustamante DO: cell ([phone])  Curt Palmer MD: cell ([phone])  Bubba Kelly NP: cell ([phone])   Renay Morales DNP: cell ([phone])  Avery Santiago LMSW: cell ([phone])   Angela King NP: via [employer]    Can contact any Palliative Team member via [employer] for consults and questions      OVERNIGHT EVENTS:  Received additional 1 unit PRBC 6/19 for Hgb of 6.9.    Patient examined this morning with brother Germán at bedside.  Alert and oriented x 2, slightly more confused today.  Remains audibly congested.  Now reporting 9/10 bilateral leg pain.  Denies overt chest pain, SOB, nausea, or vomiting.  Otherwise has no acute complaints.      Present Symptoms: Mild, Moderate, Severe  Pain:  Moderate             Location -   bilateral legs                            Aggravating factors -             Quality -  unable to describe             Radiation -             Timing-             Severity (0-10 scale):  9/10             Minimal acceptable level (0-10 scale):  2  Fatigue:  Mild  Nausea: Denies  Lack of Appetite:   SOB:  Denies  Depression:  Anxiety:  Review of Systems:  All other systems reviewed and are negative      MEDICATIONS  (STANDING):  acetaminophen   IVPB .. 1000 milliGRAM(s) IV Intermittent once  acetylcysteine 20%  Inhalation 4 milliLiter(s) Inhalation every 8 hours  albumin human 25% IVPB 50 milliLiter(s) IV Intermittent every 8 hours  albuterol    0.083% 2.5 milliGRAM(s) Nebulizer every 6 hours  albuterol    90 MICROgram(s) HFA Inhaler 2 Puff(s) Inhalation every 6 hours  buPROPion XL (24-Hour) . 150 milliGRAM(s) Oral daily  dextrose 5%. 1000 milliLiter(s) (50 mL/Hr) IV Continuous <Continuous>  dextrose 5%. 1000 milliLiter(s) (50 mL/Hr) IV Continuous <Continuous>  dorzolamide 2% Ophthalmic Solution 1 Drop(s) Right EYE <User Schedule>  epoetin madyson (PROCRIT) Injectable 36663 Unit(s) SubCutaneous <User Schedule>  finasteride 5 milliGRAM(s) Oral daily  nystatin Powder 1 Application(s) Topical two times a day  pantoprazole  Injectable 40 milliGRAM(s) IV Push every 24 hours  sodium chloride 0.9% for Nebulization 3 milliLiter(s) Nebulizer every 6 hours  sucralfate suspension 1 Gram(s) Oral four times a day  tamsulosin 0.4 milliGRAM(s) Oral at bedtime  traZODone 50 milliGRAM(s) Oral at bedtime  vitamin A &amp; D Ointment 1 Application(s) Topical at bedtime    MEDICATIONS  (PRN):  ondansetron Injectable 4 milliGRAM(s) IV Push every 8 hours PRN Nausea and/or Vomiting  sodium chloride 0.9% lock flush 10 milliLiter(s) IV Push every 1 hour PRN Pre/post blood products, medications, blood draw, and to maintain line patency      PHYSICAL EXAM:  Vital Signs Last 24 Hrs  T(C): 36.4 (20 Jun 2023 13:29), Max: 36.9 (19 Jun 2023 18:45)  T(F): 97.5 (20 Jun 2023 13:29), Max: 98.5 (19 Jun 2023 18:45)  HR: 90 (20 Jun 2023 13:29) (85 - 92)  BP: 115/59 (20 Jun 2023 13:29) (91/49 - 130/89)  BP(mean): 95 (20 Jun 2023 01:15) (95 - 97)  RR: 19 (20 Jun 2023 13:29) (16 - 20)  SpO2: 98% (20 Jun 2023 13:29) (97% - 100%)    Parameters below as of 20 Jun 2023 13:29  Patient On (Oxygen Delivery Method): nasal cannula  O2 Flow (L/min): 2      General: alert  oriented x _2_   more confused, occasional lethargy, mild temporal wasting, NAD    Palliative Performance Scale/Karnofsky Score:  30%  http://npcrc.org/files/news/palliative_performance_scale_ppsv2.pdf    HEENT:  EOMI anicteric, pharynx clear, + NGT in place  Lungs: continued audible congestion and rhonchi, lungs otherwise clear, respirations unlabored  CV:   RSR normal S1 and S2, no murmur  GI: soft non distended non tender  + normal bowel sounds   : incontinent  + Viera cath in place  Musculoskeletal: weakness x4 in all extremities, bedbound, 2+ LE edema/generalized anasarca present  Skin:  Multiple stage I DU of bilateral heels, bilateral gluteal areas, and coccyx; stage II DU to R gluteus muscle (now healed)  Neuro: no focal deficits, + mild cognitive impairment ( 2/2 ongoing metabolic encephalopathy)  Oral intake ability: unable/mouth care only      LABS:                          8.3    6.50  )-----------( 43       ( 20 Jun 2023 07:08 )             27.3     06-20    145  |  115<H>  |  51<H>  ----------------------------<  85  3.6   |  18<L>  |  5.41<H>    Ca    9.9      20 Jun 2023 07:08  Phos  3.6     06-20  Mg     2.1     06-20    TPro  4.9<L>  /  Alb  2.0<L>  /  TBili  0.7  /  DBili  x   /  AST  5<L>  /  ALT  8<L>  /  AlkPhos  92  06-20        RADIOLOGY & ADDITIONAL STUDIES:

## 2023-06-20 NOTE — PROGRESS NOTE ADULT - PROBLEM SELECTOR PLAN 5
SHEYLA on CKD 4  NAGMA-s/p Bicarb, improving Bicarb 19  Anasarca, s/p Albumin x 2 days.   Cr 5.27>>5.17   Daily CMP and phos/Mg level  Nephro Dr Olivier following  Strict I&Os  Discussed with Nephro and no plans for HD at this time.   Discussed CXR results however due to soft BP, will hold off on Lasix  Per Nephro, if BP normalizes patient would benefit from Lasix 80 mg instead of 40 mg. SHEYLA on CKD 4  NAGMA-s/p Bicarb, improving Bicarb 19  Anasarca, s/p Albumin x 2 days.   C/w Albumin Q 8 for 2 more days.   Cr 5.27>>5.17>>5.41   Daily CMP and phos/Mg level  Nephro  following and Plan for HD in the next day or 2 pending ID clearance and IR availability.   Strict I&Os  Per Nephro, if BP normalizes patient would benefit from Lasix 80 mg instead of 40 mg.  CXR noted. Soft BP this AM.

## 2023-06-20 NOTE — PROGRESS NOTE ADULT - PROBLEM SELECTOR PLAN 4
Found to have ulcerative esophagitis on initial EGD of 5/30, repeat EGD on 6/12 showed 95% clinical resolution  Now with persistent nausea and vomiting, ? failed course of Reglan    Continue IV Protonix/carafate/IV Zofran PRN  Currently on NG feeds--recommend holding due to pulmonary congestion/fluid overload  In my medical opinion he would be a poor candidate for long term PEG/enteral feeds (also no PEG per prior MOLST by patient)

## 2023-06-20 NOTE — PROGRESS NOTE ADULT - NS ATTEND AMEND GEN_ALL_CORE FT
# THROMBOCYTOPENIA  most likely multifactorial  sepsis/polypharmacy = including abx and Depakote   noted  further decline  in plat count   HIT w/u was negative in the past  repeated DIC panel positive  last week , neg today with normal fibrinogen levels   SDP tx if platelet < 20 K( sepsis)  or bleeding  keep plat > 50 K for  invasive procedure  recommend  Cryo tx if  fibrinogen < 100-150  F/u CBC  avoid nonessential meds  off Depakote after 6/19/2023  f/u plat count   if no improvement off  meds in> 7-10  days  consider further w/u with BMBX       #  NCNC ANEMIA  # SHEYLA on CKD  w/u c/w with anemia of chronic disease  f/u CBC, PRBC TX prn for HB<7   if persistent  CKD,  and EPO < 200  pt may benefit from SUREKHA inj  for h/h support   in the future   -MM w/u c/w MGUS, recommend observation for now   If invasive procedure planned recommend to use DDDAVP to decrease risk of bleeding   call with questions [phone]

## 2023-06-20 NOTE — PROGRESS NOTE ADULT - PROBLEM SELECTOR PLAN 9
Continue buproprion XL 150mg daily  Trazodone 50mg HS  Discontinued Depakote 2/2 thrombocytopenia.   Discussed with Hem/Onc and Psych  Psych re-eval for capacity. Patient at this time deemed capable of making decisions.

## 2023-06-20 NOTE — PROGRESS NOTE ADULT - PROBLEM SELECTOR PLAN 7
Has received 2U PRBC on 6/10  No evidence of active bleeding.  transfuse for Hgb < 7.0 or symptomatic.   Heme/onc QMA following  GI dr. Cheung  Patient was pale and hypotensive, lethargic 6/17.   s/p 1 PRBC 6/17 for Hgb of 7.4  Hbg today 7.2 however no plan for transfusion today.   Trend H&H

## 2023-06-20 NOTE — PROGRESS NOTE ADULT - PROBLEM SELECTOR PLAN 2
Patient with worsening SHEYLA and serum Creatinine, now approaching CKD V  He has extensive anasarca and audible pulmonary congestion on NG feeds.      Given his repeated preferences for full resuscitation and no limitation on medical interventions, including possible HD, a time limited trial of dialysis is reasonable and medically appropriate.    Please consider stopping NG feeds temporarily until fluid overload is improved  Further management as per Nephrology and AI team  See C discussion above--brother in agreement with Permacath insertion and initiation of HD

## 2023-06-20 NOTE — PROGRESS NOTE ADULT - TREATMENT GUIDELINE COMMENT
Full resuscitation with no limitations on medical treatment
CPR and trial of intubation/BIPAP, no long term PEG/enteral feeds

## 2023-06-20 NOTE — PROGRESS NOTE ADULT - CONVERSATION DETAILS
Face to face family meeting held with patient x 16 minutes to review prognosis, ACP, goals of care, and treatment preferences.  Patient is currently alert and oriented x 2-3 (off on date).  He knows that he is in the hospital and that he is very sick from multiple medical conditions.  He understands that he is in severe renal failure and at risk of needing dialysis.  Benefits vs risks/burdens of HD discussed; patient understands that he will die without dialysis.  He remains clear that he wishes to proceed with dialysis if clinically indicated.      Also briefly discussed advance directives, including benefits/risks of natural death vs full cardiac resuscitation.  Patient remains clear that he wishes to remain Full Code at this time.  Counseled patient that in case of a cardiac event requiring CPR, he would be at high risk of a poor functional outcome and/or requiring prolonged ventilatory support; despite these risks he wishes to proceed with resuscitation.  Discussed possibility of long term PEG placement--he is unable to definitively state whether he would want a long term feeding tube (he had refused PEG at time of initial C conversation).
Face to face family meeting held with brother/HCP Germán at bedside x 30 minutes (as separately identifiable service) to discuss prognosis, HCP, goals of care, and treatment preferences.  Meeting done jointly with NP Nichole Apple from  service.  Additional background obtained re: patient's prior medical/functional history.  Germán states that patient was living with him up to the early 2000s, then was stable enough from his bipolar disorder to live independently in his own apartment.  He did well for about 10 years, then had eventual gradual decline in his ability to perform ADLs and IADLs, requiring him to be placed in assisted living in Nov. 2019.  He now has had accelerated decline with decreased mobility and inability to walk/get out of bed for the last several months, compounded by his recent string of hospitalizations and TG stays.  Germán is keenly aware of patient's multiple medical conditions and overall guarded prognosis. While he realizes that his brother may never get strong enough to walk again, he otherwise feels that patient had a good quality of life (for him) prior to this admission, and remains hopeful that patient will be able to return to his prior mental baseline and enjoy a good quality of life for the future.    Discussion then turned to patient's acute medical issues, including worsening renal failure and fluid overload likely to require HD, his recurrent anemia and thrombocytopenia, and current respiratory congestion, with risk for worsening pulmonary failure and possible need for intubation.  Benefits vs risks, including long-terms burdens of dialysis and risks of Permacath placement, discussed with brother in detail.  Germán voiced understanding of the above and is clear that we should proceed with plans for at least a short term trial of dialysis, consistent with patient's previously expressed wishes.  Brother voiced understanding that should patient's respiratory status further decline, he might require intubation prior to initiation of HD, with patient having increased risk of requiring prolonged ventilatory support.  Discussed risks vs benefits of allowing natural death vs full cardiopulmonary resuscitation.  Brother again advised that in the case of a cardiac event requiring CPR, patient is at significant risk of having a poor functional outcome with further decline in his mental/functional status.  Brother voiced understanding, and remains clear that he wishes for patient to remain Full Code with no limitations on medical interventions and all treatments necessary to save his life, again consistent with patient's previous expressed wishes.      Reviewed prior MOLST orders completed  by patient on 5/30--CPR with trial of intubation, no long term PEG/enteral feeds.  Brother endorsed agreement.  Brother was appreciative of the conversation, and all his questions were answered.
Spoke with the pt's brother Germán via phone, discussed his  current clinical condition, trajectory of illness, overall goals of care. Poor prognosis.  Germán acknowledged understanding  the pt's health is declining.  Germán stated he had to make advanced care decisions for his mother and it was very clear she was not going to do well.  He is overwhelmed because on his last visit with the pt, the pt told him he wants to live. Explained despite medical management pt still remains at high risk for further complications and decompensation.   Discussed risks/benefits of LST such as CPR/ intubation, artificial nutrition and PEG in the context of multiple comorbidities, debility and severe PCM.  Family aware these invasive measures will not optimize the pt's life but may cause more stress and pain. Germán stated he wants all medical interventions to keep the pt alive.  Pt remains a FULL CODE.   All questions answered.  Support provided.  d/w ICU team

## 2023-06-20 NOTE — PROGRESS NOTE ADULT - ASSESSMENT
1. SHEYLA due to ATN  -Scr worsening today at 5.4mg/dL from 5.12mg/dL. off fluids. Kidney function not showing signs of renal recovery with underlying CKD. Recommend to initiate HD.   -Discussed with patient in detail (AAOx3) and understands about dialysis and would like to proceed with dialysis. Consult IR for permacath placement if possible.   -Adjust meds to eGFR and avoid IV Gadolinium contrast,NSAIDs, and phosphate enema.  -Monitor I/O's daily.   -Monitor SMA daily.  2. CKD stage 4 most likely due to ischemic nephropathy  -baseline scr around 3.7mg/dL in Dec after ATN with renal recovery.  -Keep patient euvolemic and renal diet  -Avoid Nephrotoxic Meds/ Agents such as (NSAIDs, IV contrast, Aminoglycosides such as gentamicin, -Gadolinium contrast, Phosphate containing enemas, etc..)  -Adjust Medications according to eGFR  3. h/o Hypotension:  -bp is stable   -monitor BP.  4. Mineral Bone Disease:  -phos is okay without binders; low phos and phos supplemented.  -PTH is low and no need vitamin d analog.  5. Anemia of CKD  -hb is better and continue epo 10,000 tiw.  -F/u CBC daily  -transfuse if HB < 7.0.  6. Hypokalemia:  -stable.   -monitor K.   7. Acidosis  -off sodium bicarbonate 650mg tid.  -monitor CO2.   8. Hypernatremia due to lack of free water intake.  -Na is improving.   -continue D5W at 50cc/hr  -on free water 250cc q6hrs.  9. Thrombocytopenia:  -workup in progress  -plan as per heme/onco     Discussed with patient in detail regarding the renal plan and care  Discussed the assessment and plan with Primary Team/Nurse   1. SHEYLA due to ATN  -Scr worsening today at 5.4mg/dL from 5.12mg/dL. off fluids. Kidney function not showing signs of renal recovery with underlying CKD. Recommend to initiate HD.   -Discussed with patient in detail (AAOx3) and understands about dialysis and would like to proceed with dialysis. Consult IR for permacath placement if possible.   -will start HD once access is established.   -Adjust meds to eGFR and avoid IV Gadolinium contrast,NSAIDs, and phosphate enema.  -Monitor I/O's daily.   -Monitor SMA daily.  2. CKD stage 4 most likely due to ischemic nephropathy  -baseline scr around 3.7mg/dL in Dec after ATN with renal recovery.  -Keep patient euvolemic and renal diet  -Avoid Nephrotoxic Meds/ Agents such as (NSAIDs, IV contrast, Aminoglycosides such as gentamicin, -Gadolinium contrast, Phosphate containing enemas, etc..)  -Adjust Medications according to eGFR  3. h/o Hypotension:  -bp is stable   -monitor BP.  4. Mineral Bone Disease:  -phos is okay without binders; low phos and phos supplemented.  -PTH is low and no need vitamin d analog.  5. Anemia of CKD  -hb is better and continue epo 10,000 tiw.  -F/u CBC daily  -transfuse if HB < 7.0.  6. Hypokalemia:  -stable.   -monitor K.   7. Acidosis  -off sodium bicarbonate 650mg tid.  -monitor CO2.   8. Hypernatremia due to lack of free water intake.  -Na improved  -off D5W at 50cc/hr  -on free water 250cc q6hrs.  9. Thrombocytopenia:  -workup in progress  -plan as per heme/onco     Discussed with patient in detail regarding the renal plan and care  Discussed the assessment and plan with Primary Team/Nurse

## 2023-06-20 NOTE — PROGRESS NOTE ADULT - PROBLEM SELECTOR PLAN 1
Secondary to pseudomonas bacteremia and UTI  Blood culture from 6/11 Negative.  Antibiotics completed.  Continue to monitor off antibiotics.  Afebrile.   IVF on hold 2/2 chest congestion and CXR noted.   Lasix held 2/2 soft bp.   No leukocytosis. Secondary to pseudomonas bacteremia and UTI  Blood culture from 6/10 Negative.  Sputum cx 6/10 negative.   Antibiotics completed.  Continue to monitor off antibiotics.  IVF on hold 2/2 chest congestion   CXR shows right-sided atelectasis.  Lasix on hold 2/2 soft bp.   No leukocytosis. Afebrile.

## 2023-06-20 NOTE — PROGRESS NOTE ADULT - SUBJECTIVE AND OBJECTIVE BOX
GAYLA PEARCE    SCU progress note    INTERVAL HPI/OVERNIGHT EVENTS: Drop in Hgb overnight. S/p 1 unit PRBC.     FULL CODE.     Covid - 19 PCR: 06/16/2023 Non-reactive.     The 4Ms    What Matters Most: see GOC  Age appropriate Medications/Screen for High Risk Medication: Yes  Mentation: see CAM below  Mobility: defer to physical exam    The Confusion Assessment Method (CAM) Diagnostic Algorithm     1: Acute Onset or Fluctuating Course  - Is there evidence of an acute change in mental status from the patient’s baseline? Did the (abnormal) behavior  fluctuate during the day, that is, tend to come and go, or increase and decrease in severity?  [ ] YES [ ] NO     2: Inattention  - Did the patient have difficulty focusing attention, being easily distractible, or having difficulty keeping track of what was being said?  [ ] YES [ ] NO     3: Disorganized thinking  -Was the patient’s thinking disorganized or incoherent, such as rambling or irrelevant conversation, unclear or illogical flow of ideas, or unpredictable switching from subject to subject?  [ ] YES [ ] NO    4: Altered Level of consciousness?  [ ] YES [ ] NO    The diagnosis of delirium by CAM requires the presence of features 1 and 2 and either 3 or 4.    PRESSORS: [ ] YES [ ] NO    Cardiovascular:  Heart Failure  Acute   Acute on Chronic  Chronic         Pulmonary:  albuterol    90 MICROgram(s) HFA Inhaler 2 Puff(s) Inhalation every 6 hours  sodium chloride 0.9% for Nebulization 3 milliLiter(s) Nebulizer every 6 hours    Hematalogic:    Other:  albumin human 25% IVPB 50 milliLiter(s) IV Intermittent every 8 hours  buPROPion XL (24-Hour) . 150 milliGRAM(s) Oral daily  dextrose 5%. 1000 milliLiter(s) IV Continuous <Continuous>  dextrose 5%. 1000 milliLiter(s) IV Continuous <Continuous>  dorzolamide 2% Ophthalmic Solution 1 Drop(s) Right EYE <User Schedule>  epoetin madyson (PROCRIT) Injectable 43916 Unit(s) SubCutaneous <User Schedule>  finasteride 5 milliGRAM(s) Oral daily  nystatin Powder 1 Application(s) Topical two times a day  ondansetron Injectable 4 milliGRAM(s) IV Push every 8 hours PRN  pantoprazole  Injectable 40 milliGRAM(s) IV Push every 24 hours  sodium chloride 0.9% lock flush 10 milliLiter(s) IV Push every 1 hour PRN  sucralfate suspension 1 Gram(s) Oral four times a day  tamsulosin 0.4 milliGRAM(s) Oral at bedtime  traZODone 50 milliGRAM(s) Oral at bedtime  vitamin A &amp; D Ointment 1 Application(s) Topical at bedtime    albumin human 25% IVPB 50 milliLiter(s) IV Intermittent every 8 hours  albuterol    90 MICROgram(s) HFA Inhaler 2 Puff(s) Inhalation every 6 hours  buPROPion XL (24-Hour) . 150 milliGRAM(s) Oral daily  dextrose 5%. 1000 milliLiter(s) IV Continuous <Continuous>  dextrose 5%. 1000 milliLiter(s) IV Continuous <Continuous>  dorzolamide 2% Ophthalmic Solution 1 Drop(s) Right EYE <User Schedule>  epoetin madyson (PROCRIT) Injectable 13334 Unit(s) SubCutaneous <User Schedule>  finasteride 5 milliGRAM(s) Oral daily  nystatin Powder 1 Application(s) Topical two times a day  ondansetron Injectable 4 milliGRAM(s) IV Push every 8 hours PRN  pantoprazole  Injectable 40 milliGRAM(s) IV Push every 24 hours  sodium chloride 0.9% for Nebulization 3 milliLiter(s) Nebulizer every 6 hours  sodium chloride 0.9% lock flush 10 milliLiter(s) IV Push every 1 hour PRN  sucralfate suspension 1 Gram(s) Oral four times a day  tamsulosin 0.4 milliGRAM(s) Oral at bedtime  traZODone 50 milliGRAM(s) Oral at bedtime  vitamin A &amp; D Ointment 1 Application(s) Topical at bedtime    Drug Dosing Weight  Height (cm): 172.7 (09 Jun 2023 21:00)  Weight (kg): 84.2 (09 Jun 2023 21:00)  BMI (kg/m2): 28.2 (09 Jun 2023 21:00)  BSA (m2): 1.98 (09 Jun 2023 21:00)    CENTRAL LINE: [ ] YES [ ] NO  LOCATION:   DATE INSERTED:  REMOVE: [ ] YES [ ] NO  EXPLAIN:    HATFIELD: [ ] YES [ ] NO    DATE INSERTED:  REMOVE:  [ ] YES [ ] NO  EXPLAIN:    PAST MEDICAL & SURGICAL HISTORY:  Hypothyroid      Hyperlipidemia      Ambulatory dysfunction      Anemia      History of BPH      CKD (chronic kidney disease)      Chronic obstructive pulmonary disease (COPD)      Bipolar disorder      HLD (hyperlipidemia)      BPH (benign prostatic hyperplasia)      Chronic diastolic congestive heart failure      Lymphedema      Chronic leg pain      No significant past surgical history      06-19 @ 07:01  -  06-20 @ 07:00  --------------------------------------------------------  IN: 0 mL / OUT: 1000 mL / NET: -1000 mL      ROS:     PHYSICAL EXAM:    GENERAL:   HEAD:  Atraumatic, Normocephalic  EYES:   ENMT:   NECK:   NERVOUS SYSTEM:    CHEST/LUNG:   HEART:   ABDOMEN:  EXTREMITIES:   LYMPH:   SKIN:       LABS:  CBC Full  -  ( 19 Jun 2023 20:30 )  WBC Count : 5.82 K/uL  RBC Count : 2.35 M/uL  Hemoglobin : 6.9 g/dL  Hematocrit : 23.0 %  Platelet Count - Automated : 44 K/uL  Mean Cell Volume : 97.9 fl  Mean Cell Hemoglobin : 29.4 pg  Mean Cell Hemoglobin Concentration : 30.0 gm/dL  Auto Neutrophil # : x  Auto Lymphocyte # : x  Auto Monocyte # : x  Auto Eosinophil # : x  Auto Basophil # : x  Auto Neutrophil % : x  Auto Lymphocyte % : x  Auto Monocyte % : x  Auto Eosinophil % : x  Auto Basophil % : x    06-19    145  |  114<H>  |  49<H>  ----------------------------<  85  3.4<L>   |  19<L>  |  5.17<H>    Ca    9.7      19 Jun 2023 06:03  Phos  4.3     06-19  Mg     2.1     06-19    TPro  5.0<L>  /  Alb  2.2<L>  /  TBili  0.6  /  DBili  x   /  AST  5<L>  /  ALT  7<L>  /  AlkPhos  81  06-19    PT/INR - ( 19 Jun 2023 06:03 )   PT: 13.7 sec;   INR: 1.15 ratio         PTT - ( 19 Jun 2023 06:03 )  PTT:36.9 sec    [ x ]  DVT Prophylaxis: SCDs  [ x ]  Feeding tube    RADIOLOGY & ADDITIONAL STUDIES:   < from: Xray Chest 1 View- PORTABLE-Urgent (Xray Chest 1 View- PORTABLE-Urgent .) (06.19.23 @ 12:27) >  ACC: [id number] EXAM:  XR CHEST PORTABLE URGENT 1V   ORDERED BY: FABRICE WORTHY     PROCEDURE DATE:  06/19/2023          INTERPRETATION:  TIME OF EXAM: June 19, 2023 at 12:00 PM.    CLINICAL INFORMATION: Chronic kidney disease. Congestive heart failure.   Admitted for complicated UTI. Esophagitis. Bacteremia. Sepsis. Fluid   overload.    COMPARISON:  June 17, 2023.    TECHNIQUE:   AP Portable chest x-ray.    INTERPRETATION:    Heart size and the mediastinum cannot be accurately evaluated on this   projection. Calcified thoracic aorta.  Enteric tube tip is in the stomach.  Elevated right hemidiaphragm again seen.  There is new hazy opacity projecting over the lower half of the right   chest. There is left lower lung linear atelectasis. The left lung is   otherwise clear. There is a trace left pleural effusion.  No pneumothorax is seen.  There is osteoarthritic degenerative change of the spine. Old right rib   fractures are seen.    IMPRESSION:  Enteric tube tip is in the stomach.    Elevated right hemidiaphragm again seen.    New hazy opacity projecting over the lower half of the right chest,   possibly a layering right pleural effusion with associated passive   atelectasis. Atelectasis of other cause and/or developing pneumonia is   not excluded.    --- End of Report ---      TREVOR OLIVAS MD; Attending Radiologist  This document has been electronically signed. Jun 19 2023  4:00PM    < end of copied text >         GAYLA PEARCE    SCU progress note    INTERVAL HPI/OVERNIGHT EVENTS: Drop in Hgb overnight. S/p 1 unit PRBC.     FULL CODE.     Covid - 19 PCR: 06/16/2023 Non-reactive.     The 4Ms    What Matters Most: see GOC  Age appropriate Medications/Screen for High Risk Medication: Yes  Mentation: see CAM below  Mobility: defer to physical exam    The Confusion Assessment Method (CAM) Diagnostic Algorithm     1: Acute Onset or Fluctuating Course  - Is there evidence of an acute change in mental status from the patient’s baseline? Did the (abnormal) behavior  fluctuate during the day, that is, tend to come and go, or increase and decrease in severity?  [ ] YES [x ] NO     2: Inattention  - Did the patient have difficulty focusing attention, being easily distractible, or having difficulty keeping track of what was being said?  [ ] YES [x ] NO     3: Disorganized thinking  -Was the patient’s thinking disorganized or incoherent, such as rambling or irrelevant conversation, unclear or illogical flow of ideas, or unpredictable switching from subject to subject?  [ ] YES [x ] NO    4: Altered Level of consciousness?  [ ] YES [x ] NO    The diagnosis of delirium by CAM requires the presence of features 1 and 2 and either 3 or 4.    PRESSORS: [ ] YES [x ] NO    Cardiovascular:  Heart Failure  Acute   Acute on Chronic  Chronic         Pulmonary:  albuterol    90 MICROgram(s) HFA Inhaler 2 Puff(s) Inhalation every 6 hours  sodium chloride 0.9% for Nebulization 3 milliLiter(s) Nebulizer every 6 hours    Hematalogic:    Other:  albumin human 25% IVPB 50 milliLiter(s) IV Intermittent every 8 hours  buPROPion XL (24-Hour) . 150 milliGRAM(s) Oral daily  dextrose 5%. 1000 milliLiter(s) IV Continuous <Continuous>  dextrose 5%. 1000 milliLiter(s) IV Continuous <Continuous>  dorzolamide 2% Ophthalmic Solution 1 Drop(s) Right EYE <User Schedule>  epoetin madyson (PROCRIT) Injectable 09373 Unit(s) SubCutaneous <User Schedule>  finasteride 5 milliGRAM(s) Oral daily  nystatin Powder 1 Application(s) Topical two times a day  ondansetron Injectable 4 milliGRAM(s) IV Push every 8 hours PRN  pantoprazole  Injectable 40 milliGRAM(s) IV Push every 24 hours  sodium chloride 0.9% lock flush 10 milliLiter(s) IV Push every 1 hour PRN  sucralfate suspension 1 Gram(s) Oral four times a day  tamsulosin 0.4 milliGRAM(s) Oral at bedtime  traZODone 50 milliGRAM(s) Oral at bedtime  vitamin A &amp; D Ointment 1 Application(s) Topical at bedtime    albumin human 25% IVPB 50 milliLiter(s) IV Intermittent every 8 hours  albuterol    90 MICROgram(s) HFA Inhaler 2 Puff(s) Inhalation every 6 hours  buPROPion XL (24-Hour) . 150 milliGRAM(s) Oral daily  dextrose 5%. 1000 milliLiter(s) IV Continuous <Continuous>  dextrose 5%. 1000 milliLiter(s) IV Continuous <Continuous>  dorzolamide 2% Ophthalmic Solution 1 Drop(s) Right EYE <User Schedule>  epoetin madyson (PROCRIT) Injectable 73263 Unit(s) SubCutaneous <User Schedule>  finasteride 5 milliGRAM(s) Oral daily  nystatin Powder 1 Application(s) Topical two times a day  ondansetron Injectable 4 milliGRAM(s) IV Push every 8 hours PRN  pantoprazole  Injectable 40 milliGRAM(s) IV Push every 24 hours  sodium chloride 0.9% for Nebulization 3 milliLiter(s) Nebulizer every 6 hours  sodium chloride 0.9% lock flush 10 milliLiter(s) IV Push every 1 hour PRN  sucralfate suspension 1 Gram(s) Oral four times a day  tamsulosin 0.4 milliGRAM(s) Oral at bedtime  traZODone 50 milliGRAM(s) Oral at bedtime  vitamin A &amp; D Ointment 1 Application(s) Topical at bedtime    Drug Dosing Weight  Height (cm): 172.7 (09 Jun 2023 21:00)  Weight (kg): 84.2 (09 Jun 2023 21:00)  BMI (kg/m2): 28.2 (09 Jun 2023 21:00)  BSA (m2): 1.98 (09 Jun 2023 21:00)    CENTRAL LINE: [ ] YES [x ] NO  LOCATION:   DATE INSERTED:  REMOVE: [ ] YES [ ] NO  EXPLAIN:    HATFIELD: [ ] YES [x ] NO    DATE INSERTED:  REMOVE:  [ ] YES [ ] NO  EXPLAIN:    PAST MEDICAL & SURGICAL HISTORY:  Hypothyroid      Hyperlipidemia      Ambulatory dysfunction      Anemia      History of BPH      CKD (chronic kidney disease)      Chronic obstructive pulmonary disease (COPD)      Bipolar disorder      HLD (hyperlipidemia)      BPH (benign prostatic hyperplasia)      Chronic diastolic congestive heart failure      Lymphedema      Chronic leg pain      No significant past surgical history      06-19 @ 07:01  -  06-20 @ 07:00  --------------------------------------------------------  IN: 0 mL / OUT: 1000 mL / NET: -1000 mL      ROS:     PHYSICAL EXAM:    GENERAL: NAD. Ill-appearing.   HEAD:  Atraumatic, Normocephalic.   EYES: PERRLA. Conjunctiva clear. No exudate.   ENMT: Dry mucous membranes. Teeth intact.   NECK: Supple.   NERVOUS SYSTEM: Lethargic. Moves all extremities spontaneously. Lifts arms AG. Generalized weakness.    CHEST/LUNG: Rhonchi b/l throughout R>L. On 2L NC. No wheezing. No friction.   HEART: S1/S2 present and regular rate/rhythm. No gallops. No murmur appreciated.   ABDOMEN: Soft, tender to palpation throughout.  +soft BM today. No signs of bleeding.   EXTREMITIES: +3 pitting edema b/l lower extremities. Anasarca.   LYMPH: No cervical lymph note swelling appreciated.   SKIN: Pale, lower exts cool to touch. Pressure ulcer to right heel (DTI).       LABS:  CBC Full  -  ( 19 Jun 2023 20:30 )  WBC Count : 5.82 K/uL  RBC Count : 2.35 M/uL  Hemoglobin : 6.9 g/dL  Hematocrit : 23.0 %  Platelet Count - Automated : 44 K/uL  Mean Cell Volume : 97.9 fl  Mean Cell Hemoglobin : 29.4 pg  Mean Cell Hemoglobin Concentration : 30.0 gm/dL  Auto Neutrophil # : x  Auto Lymphocyte # : x  Auto Monocyte # : x  Auto Eosinophil # : x  Auto Basophil # : x  Auto Neutrophil % : x  Auto Lymphocyte % : x  Auto Monocyte % : x  Auto Eosinophil % : x  Auto Basophil % : x    06-19    145  |  114<H>  |  49<H>  ----------------------------<  85  3.4<L>   |  19<L>  |  5.17<H>    Ca    9.7      19 Jun 2023 06:03  Phos  4.3     06-19  Mg     2.1     06-19    TPro  5.0<L>  /  Alb  2.2<L>  /  TBili  0.6  /  DBili  x   /  AST  5<L>  /  ALT  7<L>  /  AlkPhos  81  06-19    PT/INR - ( 19 Jun 2023 06:03 )   PT: 13.7 sec;   INR: 1.15 ratio         PTT - ( 19 Jun 2023 06:03 )  PTT:36.9 sec    [ x ]  DVT Prophylaxis: SCDs  [ x ]  Feeding tube    RADIOLOGY & ADDITIONAL STUDIES:   < from: Xray Chest 1 View- PORTABLE-Urgent (Xray Chest 1 View- PORTABLE-Urgent .) (06.19.23 @ 12:27) >  ACC: [id number] EXAM:  XR CHEST PORTABLE URGENT 1V   ORDERED BY: FABRICE WORTHY     PROCEDURE DATE:  06/19/2023          INTERPRETATION:  TIME OF EXAM: June 19, 2023 at 12:00 PM.    CLINICAL INFORMATION: Chronic kidney disease. Congestive heart failure.   Admitted for complicated UTI. Esophagitis. Bacteremia. Sepsis. Fluid   overload.    COMPARISON:  June 17, 2023.    TECHNIQUE:   AP Portable chest x-ray.    INTERPRETATION:    Heart size and the mediastinum cannot be accurately evaluated on this   projection. Calcified thoracic aorta.  Enteric tube tip is in the stomach.  Elevated right hemidiaphragm again seen.  There is new hazy opacity projecting over the lower half of the right   chest. There is left lower lung linear atelectasis. The left lung is   otherwise clear. There is a trace left pleural effusion.  No pneumothorax is seen.  There is osteoarthritic degenerative change of the spine. Old right rib   fractures are seen.    IMPRESSION:  Enteric tube tip is in the stomach.    Elevated right hemidiaphragm again seen.    New hazy opacity projecting over the lower half of the right chest,   possibly a layering right pleural effusion with associated passive   atelectasis. Atelectasis of other cause and/or developing pneumonia is   not excluded.    --- End of Report ---      TREVOR OLIVAS MD; Attending Radiologist  This document has been electronically signed. Jun 19 2023  4:00PM    < end of copied text >    Plan of care discussed with intensivist, nephro and Palliative care.

## 2023-06-20 NOTE — PROGRESS NOTE ADULT - NS ATTEND AMEND GEN_ALL_CORE FT
- Complete right lung atelectasis   - Conservative management   - Frequent suction with bronchodilators   - Aggressive chest PT with suction   - Keep right side up   - Bronchodilators   - Repeat CXR in afternoon   - Palliative eval  with meeting with brother today   - Pat has capacity as per Psy   - Will need permcath placement for HD pending family meeting

## 2023-06-20 NOTE — PROGRESS NOTE ADULT - ASSESSMENT
63M, from [place], AOx3, bedbound, chronic FC, h/o obesity, HTN, HLD, PVD, Seizures, CKD (base SCr 3.8), anemia, BPH, Lymphedema, hypothyroidism, HFpEF, COPD, decubitus ulcer (sacrum/gluteal), polyneuropathy, polyarthritis, bipolar disorder, p/w abd pain a/w "brown” emesis and food intolerance x4d. Admitted for complicated UTI + suspected GIB.  Patient initially had BP of 88/62 but it improved with IVF. Patient admitted to poor oral intake for the past 4 days due to nausea and vomiting.  CT head was negative for acute changes. CT abd showed circumferential wall thickening of distal esophagus. PPI was started.  Patient complains of persistent nausea without vomiting. Reglan was started TID to improve GI motility. Clear liquid diet was started. Hemoglobin was stable around his baseline ~9. Pt had thrombocytopenia with concern for cefepime induced thrombobytopenia.  We are following daily d-dimers and dribrinogen levels with plan to transfuse 10units cryo if PLTs < 100 or if PLTs < 150 with bleeding. PLTs have been stable in 70-50's and Heparin subQ was started for DVT prophylaxis.    Patient had chronic daley from Dec 2022 and he failed TOV. UA was positive. Patient was started on Cefepime. Blood cultures and urine cultures were sent.  Wound care was consulted for sacral pressure ulcer.    Patient presented with BUN/Cr 103/4.98 (Baseline Cr 3.6~3.8). Patient received IVF and SCr has been slowly improving. Dr. Valenzuela nephrologist is on board.    Patient presented with Hypokalemia with K 3.1->2.8, likely due to vomiting and decreased PO intake. It was repleted IV and oral.    06/16: Thrombocytopenia improving. S/p Cryo. Hypernatremic with worsening SHEYLA on CKD. Likely pre-renal. Discussed with Nephrology. Albumin and light hydration. Anasarca. Extended dwell to RUE with redness and swelling around IV site. Afebrile.   S/p ABX.     06/17: Plt blue top 30 and drop in H&H. patient pale and lethargic. One unit of Platelet and 1 PRCBC ordered. Plan for NG tube insertion post platelet transfusion. Hypotensive and tachycardic overnight x 1 event. Improvement this AM. EKG noted. RUE venous doppler US negative for DVT. Hypernatremic with slight increase in SCr. Nephro following. Will start free water flushes once NG tube in. Dietitian consult for enteral feeding assessment and recommendations.     6/18: more alert and awake at baseline. s/p vomiting about 100ml with c/o nausea. s/p zofran. last BM 6/16, ordered dulcolax supp.  Hypernatremia improving. feeding held x few hours then resumed.     06/19: Emesis x 1. Feeding tube held. Reglan. Afebrile. Chest congestion. 3% Sodium chloride inhaler and chest PT. CXR noted and discussed findings with intensivist. Lasix held 2/2 soft bp. Discussed with Nephro. No plan for HD at this time. Thrombocytopenia worsening Discussed wit Hem/Onc. Concern that it could be drug induced (Depakote). Discussed with Psych and Depakote discontinued. Psych re-eval for capacity. Updated patient's brother on clinical condition.     06/20: Drop in Hgb. S/p 1 PRBC. Albumin x 2 days. NEphro following.      63M, from [place], AOx3, bedbound, chronic FC, h/o obesity, HTN, HLD, PVD, Seizures, CKD (base SCr 3.8), anemia, BPH, Lymphedema, hypothyroidism, HFpEF, COPD, decubitus ulcer (sacrum/gluteal), polyneuropathy, polyarthritis, bipolar disorder, p/w abd pain a/w "brown” emesis and food intolerance x4d. Admitted for complicated UTI + suspected GIB.  Patient initially had BP of 88/62 but it improved with IVF. Patient admitted to poor oral intake for the past 4 days due to nausea and vomiting.  CT head was negative for acute changes. CT abd showed circumferential wall thickening of distal esophagus. PPI was started.  Patient complains of persistent nausea without vomiting. Reglan was started TID to improve GI motility. Clear liquid diet was started. Hemoglobin was stable around his baseline ~9. Pt had thrombocytopenia with concern for cefepime induced thrombobytopenia.  We are following daily d-dimers and dribrinogen levels with plan to transfuse 10units cryo if PLTs < 100 or if PLTs < 150 with bleeding. PLTs have been stable in 70-50's and Heparin subQ was started for DVT prophylaxis.    Patient had chronic daley from Dec 2022 and he failed TOV. UA was positive. Patient was started on Cefepime. Blood cultures and urine cultures were sent.  Wound care was consulted for sacral pressure ulcer.    Patient presented with BUN/Cr 103/4.98 (Baseline Cr 3.6~3.8). Patient received IVF and SCr has been slowly improving. Dr. Valenzuela nephrologist is on board.    Patient presented with Hypokalemia with K 3.1->2.8, likely due to vomiting and decreased PO intake. It was repleted IV and oral.    06/16: Thrombocytopenia improving. S/p Cryo. Hypernatremic with worsening SHEYLA on CKD. Likely pre-renal. Discussed with Nephrology. Albumin and light hydration. Anasarca. Extended dwell to RUE with redness and swelling around IV site. Afebrile.   S/p ABX.     06/17: Plt blue top 30 and drop in H&H. patient pale and lethargic. One unit of Platelet and 1 PRCBC ordered. Plan for NG tube insertion post platelet transfusion. Hypotensive and tachycardic overnight x 1 event. Improvement this AM. EKG noted. RUE venous doppler US negative for DVT. Hypernatremic with slight increase in SCr. Nephro following. Will start free water flushes once NG tube in. Dietitian consult for enteral feeding assessment and recommendations.     6/18: more alert and awake at baseline. s/p vomiting about 100ml with c/o nausea. s/p zofran. last BM 6/16, ordered dulcolax supp.  Hypernatremia improving. feeding held x few hours then resumed.     06/19: Emesis x 1. Feeding tube held. Reglan. Afebrile. Chest congestion. 3% Sodium chloride inhaler and chest PT. CXR noted and discussed findings with intensivist. Lasix held 2/2 soft bp. Discussed with Nephro. No plan for HD at this time. Thrombocytopenia worsening Discussed wit Hem/Onc. Concern that it could be drug induced (Depakote). Discussed with Psych and Depakote discontinued. Psych re-eval for capacity. Updated patient's brother on clinical condition.     06/20: Drop in Hgb overnight. Hgb 6.9.  S/p 1 PRBC. Albumin 25% Q 8 x 2 days. Nephro following. Plan for HD in the next 1 to 2 days pending ID clearance and IR availability. Patient will need Platelet transfusion to maintain Plt count > 50 prior to Perm Cath insertion. Discussion with patient, brother and Palliative care took place at bedside. Updates given on clinical status and plan of care. As of now patient and brother (HCP) wants to maintain FULL CODE status and agrees with plan for dialysis. Called IR to schedule procedure. Pending ID clearance. CXR this AM shows complete right-sided atelectasis. Plan for chest PT, Mucomyst and repositioning patient to maintain right-sided elevated. Suctioning as needed.   Patient exposed to Staph Auris and swabbed by Infection control department on 06/19/2023 by RN as guided by Samaria (infection control hospital staff). Spoke today with Spike Perez (Infection preventionist) from [facility] who advised that results should be available in 3 days to 1 week.

## 2023-06-20 NOTE — PROGRESS NOTE ADULT - SUBJECTIVE AND OBJECTIVE BOX
Patient is a 63y old  Male who presents with a chief complaint of sepsis      SUBJECTIVE / OVERNIGHT EVENTS:        MEDICATIONS  (STANDING):  acetylcysteine 20%  Inhalation 4 milliLiter(s) Inhalation every 8 hours  albumin human 25% IVPB 50 milliLiter(s) IV Intermittent every 8 hours  albuterol    90 MICROgram(s) HFA Inhaler 2 Puff(s) Inhalation every 6 hours  buPROPion XL (24-Hour) . 150 milliGRAM(s) Oral daily  dextrose 5%. 1000 milliLiter(s) (50 mL/Hr) IV Continuous <Continuous>  dextrose 5%. 1000 milliLiter(s) (50 mL/Hr) IV Continuous <Continuous>  dorzolamide 2% Ophthalmic Solution 1 Drop(s) Right EYE <User Schedule>  epoetin madyson (PROCRIT) Injectable 18611 Unit(s) SubCutaneous <User Schedule>  finasteride 5 milliGRAM(s) Oral daily  nystatin Powder 1 Application(s) Topical two times a day  pantoprazole  Injectable 40 milliGRAM(s) IV Push every 24 hours  sodium chloride 0.9% for Nebulization 3 milliLiter(s) Nebulizer every 6 hours  sucralfate suspension 1 Gram(s) Oral four times a day  tamsulosin 0.4 milliGRAM(s) Oral at bedtime  traZODone 50 milliGRAM(s) Oral at bedtime  vitamin A &amp; D Ointment 1 Application(s) Topical at bedtime    MEDICATIONS  (PRN):  ondansetron Injectable 4 milliGRAM(s) IV Push every 8 hours PRN Nausea and/or Vomiting  sodium chloride 0.9% lock flush 10 milliLiter(s) IV Push every 1 hour PRN Pre/post blood products, medications, blood draw, and to maintain line patency    CAPILLARY BLOOD GLUCOSE        I&O's Summary    19 Jun 2023 07:01  -  20 Jun 2023 07:00  --------------------------------------------------------  IN: 0 mL / OUT: 1000 mL / NET: -1000 mL        PHYSICAL EXAM:  Vital Signs Last 24 Hrs  T(C): 36.1 (20 Jun 2023 05:00), Max: 36.9 (19 Jun 2023 18:45)  T(F): 97 (20 Jun 2023 05:00), Max: 98.5 (19 Jun 2023 18:45)  HR: 92 (20 Jun 2023 05:00) (85 - 92)  BP: 91/49 (20 Jun 2023 09:17) (91/49 - 130/89)  BP(mean): 95 (20 Jun 2023 01:15) (95 - 97)  RR: 20 (20 Jun 2023 09:17) (16 - 20)  SpO2: 97% (20 Jun 2023 09:17) (97% - 100%)    Parameters below as of 20 Jun 2023 09:17    O2 Flow (L/min): 2      LABS:                        8.3    6.50  )-----------( 43       ( 20 Jun 2023 07:08 )             27.3     06-20    145  |  115<H>  |  51<H>  ----------------------------<  85  3.6   |  18<L>  |  5.41<H>    Ca    9.9      20 Jun 2023 07:08  Phos  3.6     06-20  Mg     2.1     06-20    TPro  4.9<L>  /  Alb  2.0<L>  /  TBili  0.7  /  DBili  x   /  AST  5<L>  /  ALT  8<L>  /  AlkPhos  92  06-20    PT/INR - ( 20 Jun 2023 07:08 )   PT: 12.7 sec;   INR: 1.07 ratio         PTT - ( 20 Jun 2023 07:08 )  PTT:37.6 sec          SARS-CoV-2: NotDetec (10 Carlin 2023 10:35)  COVID-19 PCR: NotDetec (07 Jun 2023 06:43)  COVID-19 PCR: NotDetec (04 Jun 2023 18:40)  SARS-CoV-2: NotDetec (28 May 2023 21:02)           Patient is a 63y old  Male who presents with a chief complaint of sepsis      SUBJECTIVE / OVERNIGHT EVENTS: events noted. Pt on contact d/t c. auris exposure        MEDICATIONS  (STANDING):  acetylcysteine 20%  Inhalation 4 milliLiter(s) Inhalation every 8 hours  albumin human 25% IVPB 50 milliLiter(s) IV Intermittent every 8 hours  albuterol    90 MICROgram(s) HFA Inhaler 2 Puff(s) Inhalation every 6 hours  buPROPion XL (24-Hour) . 150 milliGRAM(s) Oral daily  dextrose 5%. 1000 milliLiter(s) (50 mL/Hr) IV Continuous <Continuous>  dextrose 5%. 1000 milliLiter(s) (50 mL/Hr) IV Continuous <Continuous>  dorzolamide 2% Ophthalmic Solution 1 Drop(s) Right EYE <User Schedule>  epoetin madyson (PROCRIT) Injectable 56303 Unit(s) SubCutaneous <User Schedule>  finasteride 5 milliGRAM(s) Oral daily  nystatin Powder 1 Application(s) Topical two times a day  pantoprazole  Injectable 40 milliGRAM(s) IV Push every 24 hours  sodium chloride 0.9% for Nebulization 3 milliLiter(s) Nebulizer every 6 hours  sucralfate suspension 1 Gram(s) Oral four times a day  tamsulosin 0.4 milliGRAM(s) Oral at bedtime  traZODone 50 milliGRAM(s) Oral at bedtime  vitamin A &amp; D Ointment 1 Application(s) Topical at bedtime    MEDICATIONS  (PRN):  ondansetron Injectable 4 milliGRAM(s) IV Push every 8 hours PRN Nausea and/or Vomiting  sodium chloride 0.9% lock flush 10 milliLiter(s) IV Push every 1 hour PRN Pre/post blood products, medications, blood draw, and to maintain line patency    CAPILLARY BLOOD GLUCOSE        I&O's Summary    19 Jun 2023 07:01  -  20 Jun 2023 07:00  --------------------------------------------------------  IN: 0 mL / OUT: 1000 mL / NET: -1000 mL        PHYSICAL EXAM:  Vital Signs Last 24 Hrs  T(C): 36.1 (20 Jun 2023 05:00), Max: 36.9 (19 Jun 2023 18:45)  T(F): 97 (20 Jun 2023 05:00), Max: 98.5 (19 Jun 2023 18:45)  HR: 92 (20 Jun 2023 05:00) (85 - 92)  BP: 91/49 (20 Jun 2023 09:17) (91/49 - 130/89)  BP(mean): 95 (20 Jun 2023 01:15) (95 - 97)  RR: 20 (20 Jun 2023 09:17) (16 - 20)  SpO2: 97% (20 Jun 2023 09:17) (97% - 100%)    Parameters below as of 20 Jun 2023 09:17    O2 Flow (L/min): 2    GEN: NAD; A and O x 3, appears weak/pale   LUNGS: scattered rhonchi  HEART: S1 S2  ABDOMEN: soft, non-tender, non-distended, + BS  EXTREMITIES: B/L UE/LE edema, hyperpigmentation, scattered ecchymosis, no petechiae seen        LABS:                        8.3    6.50  )-----------( 43       ( 20 Jun 2023 07:08 )             27.3     06-20    145  |  115<H>  |  51<H>  ----------------------------<  85  3.6   |  18<L>  |  5.41<H>    Ca    9.9      20 Jun 2023 07:08  Phos  3.6     06-20  Mg     2.1     06-20    TPro  4.9<L>  /  Alb  2.0<L>  /  TBili  0.7  /  DBili  x   /  AST  5<L>  /  ALT  8<L>  /  AlkPhos  92  06-20    PT/INR - ( 20 Jun 2023 07:08 )   PT: 12.7 sec;   INR: 1.07 ratio         PTT - ( 20 Jun 2023 07:08 )  PTT:37.6 sec          SARS-CoV-2: NotDetec (10 Carlin 2023 10:35)  COVID-19 PCR: NotDetec (07 Jun 2023 06:43)  COVID-19 PCR: NotDetec (04 Jun 2023 18:40)  SARS-CoV-2: NotDetec (28 May 2023 21:02)    Rad:  < from: Xray Chest 1 View- PORTABLE-Urgent (Xray Chest 1 View- PORTABLE-Urgent .) (06.20.23 @ 09:47) >    ACC: [id number] EXAM:  XR CHEST PORTABLE URGENT 1V   ORDERED BY: FABRICE WORTHY     PROCEDURE DATE:  06/20/2023          INTERPRETATION:  AP erect chest on June 20, 2023 at 8:54 AM. Patient has   history of CHF and bacteremia. Patient has thrombocytopenia and acute   anemia and vomiting.    Heart size difficult to assess.    On June 19 there is scattered infiltrate with effusion in the right lung.    Presently the right lung is felipe out. NG tube remains.    Slight infiltrate at left base has developed.    IMPRESSION: Whiteout of the right lung likely are secondary to mucous   plug. Small developing left base infiltrate.    < end of copied text >

## 2023-06-20 NOTE — PROGRESS NOTE ADULT - PROBLEM SELECTOR PLAN 11
From [facility]  SCDs for DVT prophylaxis.  PT rec TG.   Daily CBC ,CMP, coags, fibrinogen, d-dimer  No plans for HD at this time. Will re-eval.   s/p 1 PRBC and 1 unit pf platelet 6/17  s/p NG tube insertion 6/17 to right nare.   Dietitian consult.  Hem/Onc following.   Palliative following  Family meeting 6/20 at 10 am From [facility]  SCDs for DVT prophylaxis.  PT rec TG. At this point patient unstable for PT 2/2 soft BP, thrombocytopenia and acute anemia).   Daily CBC ,CMP, coags, fibrinogen, d-dimer  No plans for HD at this time. Will re-eval.   s/p 1 PRBC and 1 unit pf platelet 6/17  S/p 1 PRBC on 6/20.   s/p NG tube insertion 6/17 to right nare.   Dietitian following.   Hem/Onc following.   Palliative following  Family meeting with Palliative and SCU team took place in patient's room today with HCP.

## 2023-06-20 NOTE — PROGRESS NOTE ADULT - PROBLEM SELECTOR PLAN 8
Likely related to medications cefepime and Depakote.  HIT panel negative.   Repeat DIC panel +   Platelet count remains low and blue top platelet count of 30.   f/u daily D-dimer and fibrinogen.  Daily coags  S/p 5U cryo.   Heparin d/c secondary to dropping platelet count.  F/U inflammatory markers  Keep Plt > 50 for invasive procedure. SDP tx of plt < 20 or bleeding.   If Fibrinogen < 100, give Cryo  Heme/onc QMA following.   s/p NG tube insertion 6/17, received 1 unit of platelet prior to insertion. Likely related to medications cefepime and Depakote.  HIT panel negative.   Repeat DIC panel +   Platelet count remains low and blue top platelet count of 31 today.   f/u daily D-dimer and fibrinogen.  Daily coags  S/p 5U cryo.   Heparin d/c secondary to dropping platelet count.  Keep Plt > 50 for invasive procedure. SDP tx of plt < 20 or bleeding.   If Fibrinogen < 100, give Cryo  Heme/onc QMA following.   s/p NG tube insertion 6/17, received 1 unit of platelet prior to insertion.

## 2023-06-20 NOTE — CHART NOTE - NSCHARTNOTEFT_GEN_A_CORE
Spoke with JODEE Alaniz regarding the need for Perm Cath and labs. PA will check with IR attending for placement. Likely to happen on Friday according to Katty. Nephro following. Will need to transfuse to keep Plt > 50 prior to Perm Cath placement. Discussed with Hem/Onc Miriam regarding plan for Perm cath.

## 2023-06-20 NOTE — PROGRESS NOTE ADULT - PROBLEM SELECTOR PLAN 4
Na 149 >>150 >>> 146>>145  Edematous b/l extremities   IVF on hold.   Noted Urine Na, Cr and Osmolality.  Goal of Na < 144  s/p NGT 6/17  Free water via NGT, on tube feeding (on hold)  Dietitian consulted  Nephro dr. Olivier Na 149 >>150 >>> 146>>145.   Anasarca.   IVF on hold.   Noted Urine Na, Cr and Osmolality.  Goal of Na < 144 per nephro.   s/p NGT insertion on  6/17.   Free water via NGT, on tube feeding (on hold).   Dietitian following.   Nephro dr. Valenzuela

## 2023-06-20 NOTE — PROGRESS NOTE ADULT - PROBLEM SELECTOR PLAN 3
S/P EGD  5/30 and 6/13  +Esophagitis  Zofran 4mg prn  Sucralfate 1Gm 4 times daily  Pantoprazole 40mg daily.  GI dr. Cheung  NGT in place 6/17 to right nare.   s/p N/V 6/19  Held feeding tube  BM today S/P EGD  5/30 and 6/13.   GI signed off.   + Ulcerative Esophagitis.   Zofran 4mg prn  Sucralfate 1Gm 4 times daily  Pantoprazole 40mg daily.  GI dr. Cheung  NGT in place 6/17 to right nare.   Held feeding tube in the setting of severe congestion.

## 2023-06-20 NOTE — PROGRESS NOTE ADULT - PROBLEM SELECTOR PLAN 3
Due to CAUTI with Pseudomonas bacteremia.    Repeat blood cultures from 6/10 have remained NGTD  Clinically resolved, completed antibiotics 6/12    Now with pulmonary congestion likely 2/2 fluid overload from worsening renal failure  Recommend holding NG feeds  Started on Mucomyst and Saline nebs  Further management as per AI team

## 2023-06-20 NOTE — PROGRESS NOTE ADULT - PROBLEM SELECTOR PLAN 10
Chronic Daley Failed multiple TOV.  C/w Flomax and finasteride when vomiting stops.  Monitor urine output  c/w daley care Per Nephro. Chronic Viera Failed multiple TOV.  C/w Flomax and finasteride when vomiting stops.  Monitor urine output  c/w Viera care Per Nephro.

## 2023-06-20 NOTE — PROGRESS NOTE ADULT - PROBLEM SELECTOR PLAN 8
Patient with worsening SHEYLA on CKD, functional quadriplegia, and multiple medical comorbidities.  He is at high risk of increased morbidity and mortality, including further functional decline, worsening skin failure, recurrent infections, further decline in renal and cardiac status, and recurrent hospitalizations (patient already with multiple hospital admits since 2021). He now has an eGFR of 12 with anasarca/fluid overload and will likely require dialysis in the very near future.  He is at increased risk of death over the next 6-12 months and would be low threshold for hospice, depending upon his future clinical course.  Give his multiple medical comorbidities, he would be unlikely to have a good functional outcome in the event of in-hospital cardiac arrest requiring CPR.     consult greatly appreciated.  Even though patient's understanding into his overall prognosis may be limited/vague, he has consistently stated that he wants everything done to save his life, including dialysis and full CPR.      See GOC from 6/19 and today--patient remains clear that he wishes to remain Full Code and to proceed with HD if clinically indicated.  Given his consistently expressed wishes and his medical acuity, a time limited trial of dialysis would be medically appropriate.  Brother/HCP also continues to endorse CPR and trial of intubation w/o limitation on medical interventions, and trial of HD, voiced understanding of short and long term risks/burdens    Recommend holding NGT feeds temporarily due to fluid overload  Remainder of medical management as per AI team  Patient remains Full Code at this time      GOC remain clear for aggressive care.  No other acute Palliative Care needs identified at this time  Palliative Care team will sign off.  Please reconsult PRN or if there is significant change in goals of care.

## 2023-06-20 NOTE — PROGRESS NOTE ADULT - PROBLEM SELECTOR PLAN 1
Patient remains alert and oriented x 2.  Less confused/lethargic but continues to have limited/poor insight into his medical condition.    Consult appreciated--patient retains the capacity of choosing his code status, as he has been consistent with wanting everything done to save his life.    Patient remains Full Code, endorsed by brother/HCP--see C discussion above  Further management per /primary medical team

## 2023-06-20 NOTE — PROGRESS NOTE ADULT - PROBLEM SELECTOR PLAN 2
Hx of GIB  S/P EGD X2  5/30 and 6/12. No active bleeding found  S/p 2U PRBCs on 6/10  Drop in H&H 6/17, s/p 1 U PRBC and 1 U Platelet, H/H improving  Drop in H&H 06/19 7.2/23.9  Plt 38  No s/s of bleeding   Monitor CBC Hx of GIB. (hematemesis).   S/P EGD X2  5/30 and 6/12. No active bleeding found. Ulcerative esophagitis.   GI signed off. Conservative management per GI.   S/p 2U PRBCs on 6/10  Drop in H&H 6/17, s/p 1 U PRBC and 1 U Platelet, H/H improving  Drop in H&H 06/19 7.2/23.9  Plt 38  Drop in Hgb 06/20 7.2>>6.9 Plt 31  S/p 1 unit PRBC on 6/20  No s/s of bleeding   Monitor CBC

## 2023-06-20 NOTE — PROGRESS NOTE ADULT - ASSESSMENT
complete note to follow    #VTE Prophylaxis     Assessment and Plan:   · Assessment	    #Normocytic Anemia  #Thrombocytopenia  GNR sepsis, hypotension d/t UTI, esophagitis  SHEYLA on CKD  repeat BCx now (-)  no active bleeding/ecchymosis  LFT's nl  elevated PT/PTT  PF4 (-)  peripheral smear NO schistocytes  hemolysis (-)  iron panel c/w ACD, hyperferritinemia likely d/t reactive process  Low TSH  DIC panel was + 6/15  Rec's:  -etiology thrombocytopenia multi-factorial s/p GNR sepsis/Esophagitis/and on valproate sodium---> now discontinued on 6/19, monitor plt count  -etiology anemia SHEYLA on CKD, infxn, nutritional, drug induced  -ESRD, plan to start HD  -H/H stable, s/p repeat EGD resolving esophagitis, no bleeding, cont PPI  -MM w/u c/w MGUS, no tx indicated  -Platelet cont stable at 43k  -no active bleeding  -repeat fibrinogen and D-dimer nl, no indication to continue to repeat levels  -Cryo indicated if fibrinogen <100   -s/p Cryo tx given 6/15    -INR wnl  -supportive care  -pt is on depakote which can cause thrombocytopenia,  Psych ok with discontinue depakote and continue bupropion and trazodone  for bipolar  -f/u cx for c. auris  -hepatitis panel (-), repeat ekuxcoft=0267 (pt does have polyarthritis), B/12, folate, ROSANNE, FABRICE nl, SPEP nl, RF nl  - inflammatory markers pending  -PRBC transfusion if Hgb <7.0 or symptomatic  -GI on board, pt with esophagitis, on PPI  -Transfuse SDP if Plt <20k or active bleeding  -avoid non-essential meds that can exacerbate plt drop (i.e. H2 blocker)  -monitor CBC daily  -always check post SDP transfusion plt level within 1 hour to confirm adequate response  Pall Care had family discussion about hospice, but refused at this time    #RUE edema  doppler (-)  elevate  mgmt as per Medicine Team    Thank you for the referral. Will continue to monitor the patient.  Please call with any questions [phone]  Above reviewed with Attending Dr. Vera POON/[facility]  [address] [facility], [address], [place], [place]  [phone]  *Note not finalized until signed by Attending Physician   Assessment and Plan:   · Assessment	    #Normocytic Anemia  #Thrombocytopenia  GNR sepsis, hypotension d/t UTI, esophagitis  SHEYLA on CKD  repeat BCx now (-)  no active bleeding/ecchymosis  LFT's nl  elevated PT/PTT  PF4 (-)  peripheral smear NO schistocytes  hemolysis (-)  iron panel c/w ACD, hyperferritinemia likely d/t reactive process  Low TSH  DIC panel was + 6/15  Rec's:  -etiology thrombocytopenia multi-factorial s/p GNR sepsis/Esophagitis/and on valproate sodium---> now discontinued on 6/19, monitor plt count  -etiology anemia SHEYLA on CKD, infxn, nutritional, drug induced  -ESRD, plan to start HD  -H/H stable, s/p repeat EGD resolving esophagitis, no bleeding, cont PPI  -MM w/u c/w MGUS, no tx indicated  -Platelet cont stable at 43k  -no active bleeding  -repeat fibrinogen and D-dimer nl, no indication to continue to repeat levels  -Cryo indicated if fibrinogen <100   -s/p Cryo tx given 6/15    -INR wnl  -supportive care  -pt is on depakote which can cause thrombocytopenia,  Psych ok with discontinue depakote and continue bupropion and trazodone  for bipolar  -f/u cx for c. auris  -hepatitis panel (-), repeat [id number] (pt does have polyarthritis), B/12, folate, ROSANNE, FABRICE nl, SPEP nl, RF nl  - inflammatory markers pending  -PRBC transfusion if Hgb <7.0 or symptomatic  -GI on board, pt with esophagitis, on PPI  -Transfuse SDP if Plt <20k or active bleeding, keep platelets > 50 K for invasive procedure   If invasive procedure planned recommend to use DDDAVP to decrease risk of bleeding   -avoid non-essential meds that can exacerbate plt drop (i.e. H2 blocker)  -monitor CBC daily  -always check post SDP transfusion plt level within 1 hour to confirm adequate response  Pall Care had family discussion about hospice, but refused at this time    #RUE edema  doppler (-)  elevate  mgmt as per Medicine Team    Thank you for the referral. Will continue to monitor the patient.  Please call with any questions [phone]  Above reviewed with Attending Dr. Gamez  A/[facility]  [address], [address], [place], [place]  [phone]  *Note not finalized until signed by Attending Physician

## 2023-06-20 NOTE — PROGRESS NOTE ADULT - NUTRITIONAL ASSESSMENT
This patient has been assessed with a concern for Malnutrition and has been determined to have a diagnosis/diagnoses of Moderate protein-calorie malnutrition.    This patient is being managed with:   Diet NPO with Tube Feed-  Tube Feeding Modality: Nasogastric  Nepro with Carb Steady  Total Volume for 24 Hours (mL): 960  Continuous  Starting Tube Feed Rate {mL per Hour}: 20  Increase Tube Feed Rate by (mL): 10     Every 24 hours  Until Goal Tube Feed Rate (mL per Hour): 40  Tube Feed Duration (in Hours): 24  Tube Feed Start Time: 00:00  Free Water Flush  Pump   Rate (mL per Hour): 215   Frequency: Every 8 Hours  Free Water Flush Instructions:  Per Nephro  Entered: Jun 19 2023  3:12PM    Diet NPO with Tube Feed-  Tube Feeding Modality: Nasogastric  Nepro with Carb Steady  Continuous  Until Goal Tube Feed Rate (mL per Hour): 10  Tube Feed Duration (in Hours): 24  Tube Feed Start Time: 17:00  Free Water Flush  Pump   Rate (mL per Hour): 215   Frequency: Every 8 Hours  Free Water Flush Instructions:  Per Nephro  Entered: Jun 17 2023  3:08PM    The following pending diet order is being considered for treatment of Moderate protein-calorie malnutrition:null

## 2023-06-20 NOTE — PROGRESS NOTE ADULT - ASSESSMENT
62 y/o M, current resident of [facility], A&Ox3, bedbound at baseline, chronic Viera catheter with PMHx of  CKD (baseline creat 3.8), COPD, CHFpEF, BPH, PVD, Lymphedema, multiple decubitus ulcers (Sacrum/gluteal) , Anemia, polyneuropathy, polyarthritis, hypothyroidism, seizures and Bipolar disorder.  Originally BIBEMS from [facility] to [facility] late evening 5/28 for abdominal pain, nausea, and vomiting, including reports of bloody emesis.    Patient originally admitted to ICU for sepsis 2/2 CAUTI and suspected UGI bleeding.  Initial CT of abd/pelvis notable for circumferential wall thickening of the distal esophagus c/w esophagitis and bladder wall thickening but otherwise negative for intraabdominal pathology.  Started on IV vancomycin + cefepime, and IV Protonix.  Underwent EGD on 5/30 which showed ulcerative esophagitis.  Found to have Pseudomonas bacteremia (pan-sensitive).  Failed TOV, Coude Viera catheter reinserted 6/6.  Per chart review, patient does not want to return to [facility], asking for different facility, frustrated by delay in discharge (no accepting facilities yet), ? now refusing to participate in PT and speech therapy, refusing to eat.  WBC spiked to 12 on 6/8.  Also started developing worsening SHEYLA.  On 6/9 patient developed worsening confusion and disorientation; RRT called for hypotension, fever, and anemia, patient transferred back to ICU with concern for evolving septic vs. hypovolemic shock.  Started back on Levophed, antibiotics continued.  Repeat blood cultures from 6/10 have remained NGTD.  Transfused 2 units PRBCs for Hgb of 5.3.  Underwent repeat EGD on 6/12 which showed significant healing (95%) of prior esophagitis with no active bleeding.  Mental status slowly improved towards baseline, patient downgraded back to AI unit on 6/14.  Original antibiotic course completed.  Hospital course further complicated by worsening anemia and thrombocytopenia, HIT panel negative but repeat DIC panel +, received 5 units of cryoprecipitate on 6/15, and 1 unit platelets/1 unit PRBCs on 6/17.  Also developed persistent nausea and vomiting, ? poorly responsive to IV Reglan, NGT inserted on 5/17 for persistent symptoms, also started on NG feeds.  Heme/Onc consulted, thrombocytopenia felt to be multifactorial in nature, Depakote now discontinued.  Has continued severe SHEYLA on CKD without resolution.

## 2023-06-20 NOTE — PROGRESS NOTE ADULT - SUBJECTIVE AND OBJECTIVE BOX
Patient is a 63y old  Male who presents with a chief complaint of sepsis (20 Jun 2023 13:46)    PATIENT IS SEEN AND EXAMINED IN SCU.    ALLERGIES:  No Known Allergies    VITALS:    Vital Signs Last 24 Hrs  T(C): 36.9 (20 Jun 2023 21:34), Max: 36.9 (20 Jun 2023 21:34)  T(F): 98.5 (20 Jun 2023 21:34), Max: 98.5 (20 Jun 2023 21:34)  HR: 77 (20 Jun 2023 21:34) (77 - 92)  BP: 102/57 (20 Jun 2023 21:34) (91/49 - 130/89)  BP(mean): 95 (20 Jun 2023 01:15) (95 - 97)  RR: 19 (20 Jun 2023 21:34) (16 - 20)  SpO2: 98% (20 Jun 2023 21:34) (97% - 100%)    Parameters below as of 20 Jun 2023 21:34  Patient On (Oxygen Delivery Method): nasal cannula  O2 Flow (L/min): 2      LABS:    CBC Full  -  ( 20 Jun 2023 07:08 )  WBC Count : 6.50 K/uL  RBC Count : 2.83 M/uL  Hemoglobin : 8.3 g/dL  Hematocrit : 27.3 %  Platelet Count - Automated : 43 K/uL  Mean Cell Volume : 96.5 fl  Mean Cell Hemoglobin : 29.3 pg  Mean Cell Hemoglobin Concentration : 30.4 gm/dL  Auto Neutrophil # : 4.78 K/uL  Auto Lymphocyte # : 0.91 K/uL  Auto Monocyte # : 0.50 K/uL  Auto Eosinophil # : 0.10 K/uL  Auto Basophil # : 0.02 K/uL  Auto Neutrophil % : 73.6 %  Auto Lymphocyte % : 14.0 %  Auto Monocyte % : 7.7 %  Auto Eosinophil % : 1.5 %  Auto Basophil % : 0.3 %    PT/INR - ( 20 Jun 2023 07:08 )   PT: 12.7 sec;   INR: 1.07 ratio         PTT - ( 20 Jun 2023 07:08 )  PTT:37.6 sec  06-20    145  |  115<H>  |  51<H>  ----------------------------<  85  3.6   |  18<L>  |  5.41<H>    Ca    9.9      20 Jun 2023 07:08  Phos  3.6     06-20  Mg     2.1     06-20    TPro  4.9<L>  /  Alb  2.0<L>  /  TBili  0.7  /  DBili  x   /  AST  5<L>  /  ALT  8<L>  /  AlkPhos  92  06-20    CAPILLARY BLOOD GLUCOSE            LIVER FUNCTIONS - ( 20 Jun 2023 07:08 )  Alb: 2.0 g/dL / Pro: 4.9 g/dL / ALK PHOS: 92 U/L / ALT: 8 U/L DA / AST: 5 U/L / GGT: x           Creatinine Trend: 5.41<--, 5.17<--, 5.27<--, 5.17<--, 5.11<--, 5.02<--  I&O's Summary    19 Jun 2023 07:01  -  20 Jun 2023 07:00  --------------------------------------------------------  IN: 0 mL / OUT: 1000 mL / NET: -1000 mL            .Sputum Sputum  06-10 @ 10:23   Normal Respiratory Ivelisse present  --    Few polymorphonuclear leukocytes per low power field  Few Squamous epithelial cells per low power field  Rare Yeast like cells per oil power field  Few Gram Variable Rods per oil power field      .Blood Blood-Peripheral  06-10 @ 04:42   No Growth Final  --  --      .Blood Blood-Peripheral  06-10 @ 03:40   No Growth Final  --  --      .Blood Blood  06-01 @ 10:52   No Growth Final  --  --      .Blood Blood  06-01 @ 10:46   No Growth Final  --  --      .Blood Blood-Peripheral  05-28 @ 21:09   No Growth Final  --  Blood Culture PCR  Pseudomonas aeruginosa      Clean Catch Clean Catch (Midstream)  05-28 @ 21:02   >=3 organisms. Probable collection contamination.  --  --          MEDICATIONS:    MEDICATIONS  (STANDING):  acetaminophen   IVPB .. 1000 milliGRAM(s) IV Intermittent once  acetylcysteine 20%  Inhalation 4 milliLiter(s) Inhalation every 8 hours  albumin human 25% IVPB 50 milliLiter(s) IV Intermittent every 8 hours  albuterol    0.083% 2.5 milliGRAM(s) Nebulizer every 6 hours  albuterol    90 MICROgram(s) HFA Inhaler 2 Puff(s) Inhalation every 6 hours  buPROPion XL (24-Hour) . 150 milliGRAM(s) Oral daily  dextrose 5%. 1000 milliLiter(s) (50 mL/Hr) IV Continuous <Continuous>  dextrose 5%. 1000 milliLiter(s) (50 mL/Hr) IV Continuous <Continuous>  dorzolamide 2% Ophthalmic Solution 1 Drop(s) Right EYE <User Schedule>  epoetin madyson (PROCRIT) Injectable 10573 Unit(s) SubCutaneous <User Schedule>  finasteride 5 milliGRAM(s) Oral daily  nystatin Powder 1 Application(s) Topical two times a day  pantoprazole  Injectable 40 milliGRAM(s) IV Push every 24 hours  sodium chloride 0.9% for Nebulization 3 milliLiter(s) Nebulizer every 6 hours  sucralfate suspension 1 Gram(s) Oral four times a day  tamsulosin 0.4 milliGRAM(s) Oral at bedtime  traZODone 50 milliGRAM(s) Oral at bedtime  vitamin A &amp; D Ointment 1 Application(s) Topical at bedtime      MEDICATIONS  (PRN):  ondansetron Injectable 4 milliGRAM(s) IV Push every 8 hours PRN Nausea and/or Vomiting  sodium chloride 0.9% lock flush 10 milliLiter(s) IV Push every 1 hour PRN Pre/post blood products, medications, blood draw, and to maintain line patency      REVIEW OF SYSTEMS:                           ALL ROS DONE [ X   ]    CONSTITUTIONAL:  LETHARGIC [   ], FEVER [   ], UNRESPONSIVE [   ]  CVS:  CP  [   ], SOB, [   ], PALPITATIONS [   ], DIZZYNESS [   ]  RS: COUGH [   ], SPUTUM [   ]  GI: ABDOMINAL PAIN [   ], NAUSEA [   ], VOMITINGS [   ], DIARRHEA [   ], CONSTIPATION [   ]  :  DYSURIA [   ], NOCTURIA [   ], INCREASED FREQUENCY [   ], DRIBLING [   ],  SKELETAL: PAINFUL JOINTS [   ], SWOLLEN JOINTS [   ], NECK ACHE [   ], LOW BACK ACHE [   ],  SKIN : ULCERS [   ], RASH [   ], ITCHING [   ]  CNS: HEAD ACHE [   ], DOUBLE VISION [   ], BLURRED VISION [   ], AMS / CONFUSION [   ], SEIZURES [   ], WEAKNESS [   ],TINGLING / NUMBNESS [   ]      PHYSICAL EXAMINATION:    GENERAL APPEARANCE: NO DISTRESS  HEENT:  NO PALLOR, NO  JVD,  NO   NODES, NECK SUPPLE  CVS: S1 +, S2 +,   RS: AEEB,  OCCASIONAL  RALES +,   NO RONCHI  ABD: SOFT, NT, NO, BS +   NGT+  EXT: PE+  SKIN: WARM,   SKELETAL:  ROM REDUCED AT CERVICAL & LS SPINE  CNS:  AAO X 2-3      RADIOLOGY :    RADIOLOGY AND READINGS REVIEWED    ASSESSMENT :       PLAN:  HPI:  62 y/o M, A&Ox3, bedbound, chronic daley catheter with CKD (baseline creat 3.8), COPD, CHFpEF, BPH, PVD, Lymphedema, decubitus ulcers (Sacrum/gluteal) , Anemia, poly-neuropathy, poly-arthritis, hypothyroidism, seizures and Bipolar disorder was BIB EMS from [place] for abdominal pain, [place]. Mr. Rhoades states that he developed acute onset sharp abdominal pain 4 days ago a/w "brown" emesis (witnessed by EMS). Unable to tolerate food/drink x 4 days. Having normal bowel movements, last this morning 5/28. Denies any fevers. NH paperwork states concern for obstruction.  Patient endorses BLOODY EMESIS x 4 days upon further questioning. (29 May 2023 01:36)      # [6/9] - RRT CALLED FOR HYPOTENSION, FEVER AND ANEMIA - PATIENT GIVEN PRBC TRANSFUSION, VANCOMYCIN ADDED, TRANSFERRED FROM SCU TO ICU    # CANDIDA AURIS EXPOSURE  - ON ISOLATION PRECAUTIONS    # RESOLVING SEPSIS S/T P.AERUGINOSA BACTEREMIA + COMPLICATED UTI    - S/P ON CEFEPIME [AND VANCOMYCIN], BCX [P. AERUGINOSA] AND UCX [ > 3 ORGANISMS], REPEAT BCX - NGTD  - ID CONSULT  - CRITICAL CARE EVALUATION IN PROGRESS    - S/P VASOPRESSORS    # RESOLVING GI BLEED  # ESOPHAGITIS  # ANEMIA OF CHRONIC DISEASE   # THROMBOCYTOPENIA  - MONITOR HGB, TRANSFUSION THRESHOLD HGB < 8  - TREND PLT  - S/P EGD - ULCERATIVE ESOPHAGITIS  - PPI BID  - CARAFATE QID  - ADVANCING DIET PER GI RECOMMENDATION   - GI CONSULT  - HEME/ONC CONSULT    - [6/6] - EPISODE OF NAUSEA/VOMITING - MONITORING CLOSELY, PRN ANTIEMETICS    - [6/10] - GIVEN PRBC TRANSFUSION, PLANNED FOR 1 MORE UNIT OF PRBC, PLT AND FFP, DEPAKOTE STOPPED GIVEN THROMBOCYTOPENIA    - [6/12] - PLANNED FOR POSSIBLE EGD    - 6/12 - REPEAT EGD - IMPROVING ESOPHAGITIS, SHALLOW DIVERTICULUM IN DISTAL ESOPHAGUS, SINGLE INFLAMMATORY NODULE IN ANTRUM    - 6/17 - S/P PRBC TRANSFUSION     - 6/17 - NAUSEA - NGT PLACED, GI CONSULT IN PROGRESS, REGLAN AS TOLERATED GIVEN QTC    # ANEMIA DUE TO GI BLEED, ANEMIA OF CKD     # CHRONIC HYPOXIC RESPIRATORY FAILURE S/T UNDERLYING COPD  - ON PRN O2  - CRITICAL CARE EVALUATION IN PROGRESS    # THROMBOCYTOPENIA  - W/UP IN PROGRESS  - S/P PLT TRANSFUSION  - HEME/ONC CONSULT    - S/P PLT TRANSFUSION [6/17]    # AMS DUE TO ACUTE METABOLIC ENCEPHALOPATHY    # SHEYLA ON CKD STAGE 4  - ATN - WORSENING  # CHRONIC DALEY, BPH  - DALEY   - STRICT IS AND OS  - AVOID NEPHROTOXIC AGENTS  - MONITOR CR  - NEPHROLOGY CONSULT    - [6/7] - URINARY RETENTION OVERNIGHT - DALEY PLACED    - PLANNED FOR PERMACATH PLACEMENT FOR HD    # AMBULATORY DYSFUNCTION, IMPAIRED GAIT DUE TO GENERALIZED MUSCLE WEAKNESS, CERVICAL & LS SPINAL STENOSIS, POLYARTRHITIS & PERIPHERAL NEUROPATHY. OP  - FALL PRECAUTIONS    # DYSPHAGIA DUE TO METABOLIC ENCEPHALOPATHY - ON MODIFIED DIET PER SWALLOW EVAL    # HX OF SEIZURE DX  - ON VALPROIC ACID [STOPPED GIVEN THROMBOCYTOPENIA]  - DEPAKOTE    # HYPOTHYROIDISM - ON LEVOTHYROXINE    # PRESSURE ULCERS  - PATIENT IS HIGH RISK FOR PRESSURE ULCERS DESPITE PREVENTIVE MEASURES IN PLACE  - WOUND CARE CONSULT    # BIPOLAR DISORDER - ON DEPAKOTE, WELLBUTRIN XL    # GI PPX

## 2023-06-21 NOTE — PROGRESS NOTE ADULT - MUSCULOSKELETAL
negative
no joint swelling/no joint erythema/no joint warmth
no joint swelling/no joint erythema/no joint warmth
negative
no joint swelling/no joint erythema/no joint warmth

## 2023-06-21 NOTE — PROGRESS NOTE ADULT - ASSESSMENT
63M, from [place], AOx3, bedbound, chronic FC, h/o obesity, HTN, HLD, PVD, Seizures, CKD (base SCr 3.8), anemia, BPH, Lymphedema, hypothyroidism, HFpEF, COPD, decubitus ulcer (sacrum/gluteal), polyneuropathy, polyarthritis, bipolar disorder, p/w abd pain a/w "brown” emesis and food intolerance x4d. Admitted for complicated UTI + suspected GIB.  Patient initially had BP of 88/62 but it improved with IVF. Patient admitted to poor oral intake for the past 4 days due to nausea and vomiting.  CT head was negative for acute changes. CT abd showed circumferential wall thickening of distal esophagus. PPI was started.  Patient complains of persistent nausea without vomiting. Reglan was started TID to improve GI motility. Clear liquid diet was started. Hemoglobin was stable around his baseline ~9. Pt had thrombocytopenia with concern for cefepime induced thrombobytopenia.  We are following daily d-dimers and dribrinogen levels with plan to transfuse 10units cryo if PLTs < 100 or if PLTs < 150 with bleeding. PLTs have been stable in 70-50's and Heparin subQ was started for DVT prophylaxis.    Patient had chronic daley from Dec 2022 and he failed TOV. UA was positive. Patient was started on Cefepime. Blood cultures and urine cultures were sent.  Wound care was consulted for sacral pressure ulcer.    Patient presented with BUN/Cr 103/4.98 (Baseline Cr 3.6~3.8). Patient received IVF and SCr has been slowly improving. Dr. Valenzuela nephrologist is on board.    Patient presented with Hypokalemia with K 3.1->2.8, likely due to vomiting and decreased PO intake. It was repleted IV and oral.    06/16: Thrombocytopenia improving. S/p Cryo. Hypernatremic with worsening SHEYLA on CKD. Likely pre-renal. Discussed with Nephrology. Albumin and light hydration. Anasarca. Extended dwell to RUE with redness and swelling around IV site. Afebrile.   S/p ABX.     06/17: Plt blue top 30 and drop in H&H. patient pale and lethargic. One unit of Platelet and 1 PRCBC ordered. Plan for NG tube insertion post platelet transfusion. Hypotensive and tachycardic overnight x 1 event. Improvement this AM. EKG noted. RUE venous doppler US negative for DVT. Hypernatremic with slight increase in SCr. Nephro following. Will start free water flushes once NG tube in. Dietitian consult for enteral feeding assessment and recommendations.     6/18: more alert and awake at baseline. s/p vomiting about 100ml with c/o nausea. s/p zofran. last BM 6/16, ordered dulcolax supp.  Hypernatremia improving. feeding held x few hours then resumed.     06/19: Emesis x 1. Feeding tube held. Reglan. Afebrile. Chest congestion. 3% Sodium chloride inhaler and chest PT. CXR noted and discussed findings with intensivist. Lasix held 2/2 soft bp. Discussed with Nephro. No plan for HD at this time. Thrombocytopenia worsening Discussed wit Hem/Onc. Concern that it could be drug induced (Depakote). Discussed with Psych and Depakote discontinued. Psych re-eval for capacity. Updated patient's brother on clinical condition.     06/20: Drop in Hgb overnight. Hgb 6.9.  S/p 1 PRBC. Albumin 25% Q 8 x 2 days. Nephro following. Plan for HD in the next 1 to 2 days pending ID clearance and IR availability. Patient will need Platelet transfusion to maintain Plt count > 50 prior to Perm Cath insertion. Discussion with patient, brother and Palliative care took place at bedside. Updates given on clinical status and plan of care. As of now patient and brother (HCP) wants to maintain FULL CODE status and agrees with plan for dialysis. Called IR to schedule procedure. Pending ID clearance. CXR this AM shows complete right-sided atelectasis. Plan for chest PT, Mucomyst and repositioning patient to maintain right-sided elevated. Suctioning as needed.   Patient exposed to Staph Auris and swabbed by Infection control department on 06/19/2023 by RN as guided by Samaria (infection control hospital staff). Spoke today with Spike Perez (Infection preventionist) from [facility] who advised that results should be available in 3 days to 1 week.     06/21: Drop in Hgb 7.3. Type and screen. Repeat CBC in afternoon. No obvious signs of bleeding. C/w Albumin for anasarca. Plan for PermaCath with IR. Spoke with Katty today to confirm and procedure will occur on Friday. Nephro following. Pending ID clearance for PermaCath by IR. Dr. Nolasco notified.      63M, from [place], AOx3, bedbound, chronic FC, h/o obesity, HTN, HLD, PVD, Seizures, CKD (base SCr 3.8), anemia, BPH, Lymphedema, hypothyroidism, HFpEF, COPD, decubitus ulcer (sacrum/gluteal), polyneuropathy, polyarthritis, bipolar disorder, p/w abd pain a/w "brown” emesis and food intolerance x4d. Admitted for complicated UTI + suspected GIB.  Patient initially had BP of 88/62 but it improved with IVF. Patient admitted to poor oral intake for the past 4 days due to nausea and vomiting.  CT head was negative for acute changes. CT abd showed circumferential wall thickening of distal esophagus. PPI was started.  Patient complains of persistent nausea without vomiting. Reglan was started TID to improve GI motility. Clear liquid diet was started. Hemoglobin was stable around his baseline ~9. Pt had thrombocytopenia with concern for cefepime induced thrombocytopenia  We are following daily d-dimers and fibrinogen levels with plan to transfuse 10units cryo if PLTs < 100 or if PLTs < 150 with bleeding. PLTs have been stable in 70-50's and Heparin subQ was started for DVT prophylaxis.    Patient had chronic daley from Dec 2022 and he failed TOV. UA was positive. Patient was started on Cefepime. Blood cultures and urine cultures were sent.  Wound care was consulted for sacral pressure ulcer.    Patient presented with BUN/Cr 103/4.98 (Baseline Cr 3.6~3.8). Patient received IVF and SCr has been slowly improving. Dr. Valenzuela nephrologist is on board.    Patient presented with Hypokalemia with K 3.1->2.8, likely due to vomiting and decreased PO intake. It was repleted IV and oral.    06/16: Thrombocytopenia improving. S/p Cryo. Hypernatremic with worsening SHEYLA on CKD. Likely pre-renal. Discussed with Nephrology. Albumin and light hydration. Anasarca. Extended dwell to [facility] with redness and swelling around IV site. Afebrile.   S/p ABX.     06/17: Plt blue top 30 and drop in H&H. patient pale and lethargic. One unit of Platelet and 1 PRCBC ordered. Plan for NG tube insertion post platelet transfusion. Hypotensive and tachycardic overnight x 1 event. Improvement this AM. EKG noted. RUE venous doppler US negative for DVT. Hypernatremic with slight increase in SCr. Nephro following. Will start free water flushes once NG tube in. Dietitian consult for enteral feeding assessment and recommendations.     6/18: more alert and awake at baseline. s/p vomiting about 100ml with c/o nausea. s/p zofran. last BM 6/16, ordered dulcolax supp.  Hypernatremia improving. feeding held x few hours then resumed.     06/19: Emesis x 1. Feeding tube held. Reglan. Afebrile. Chest congestion. 3% Sodium chloride inhaler and chest PT. CXR noted and discussed findings with intensivist. Lasix held 2/2 soft bp. Discussed with Nephro. No plan for HD at this time. Thrombocytopenia worsening Discussed wit Hem/Onc. Concern that it could be drug induced (Depakote). Discussed with Psych and Depakote discontinued. Psych re-eval for capacity. Updated patient's brother on clinical condition.     06/20: Drop in Hgb overnight. Hgb 6.9.  S/p 1 PRBC. Albumin 25% Q 8 x 2 days. Nephro following. Plan for HD in the next 1 to 2 days pending ID clearance and IR availability. Patient will need Platelet transfusion to maintain Plt count > 50 prior to Perm Cath insertion. Discussion with patient, brother and Palliative care took place at bedside. Updates given on clinical status and plan of care. As of now patient and brother (HCP) wants to maintain FULL CODE status and agrees with plan for dialysis. Called IR to schedule procedure. Pending ID clearance. CXR this AM shows complete right-sided atelectasis. Plan for chest PT, Mucomyst and repositioning patient to maintain right-sided elevated. Suctioning as needed.   Patient exposed to Staph Auris and swabbed by Infection control department on 06/19/2023 by RN as guided by Samaria (infection control hospital staff). Spoke today with Spike Perez (Infection preventionist) from [facility] who advised that results should be available in 3 days to 1 week.     06/21: Drop in Hgb 7.3. Type and screen. Repeat CBC in afternoon. No obvious signs of bleeding. C/w Albumin for anasarca. Plan for PermaCath with IR. Spoke with Katty today to confirm and procedure will occur on Friday. Nephro following. Pending ID clearance for PermaCath by IR. Dr. Nolasco notified.

## 2023-06-21 NOTE — PROGRESS NOTE ADULT - PROBLEM SELECTOR PLAN 3
Hx of GIB. (hematemesis).   S/P EGD X2  5/30 and 6/12. No active bleeding found. Ulcerative esophagitis.   GI signed off. Conservative management per GI.   S/p 2U PRBCs on 6/10  Drop in H&H 6/17, s/p 1 U PRBC and 1 U Platelet, H/H improving  Drop in H&H 06/19 7.2/23.9  Plt 38  Drop in Hgb 06/20 7.2>>6.9 Plt 31  S/p 1 unit PRBC on 6/20  No s/s of bleeding   Monitor CBC Hx of GIB. (hematemesis).   S/P EGD X2  5/30 and 6/12. No active bleeding found. Ulcerative esophagitis.   GI signed off. Conservative management per GI.   S/p 2U PRBCs on 6/10  Drop in H&H 6/17, s/p 1 U PRBC and 1 U Platelet, H/H improving  Drop in H&H 06/19 7.2/23.9  Plt 38  Drop in Hgb 06/20 7.2>>6.9 Plt 31  S/p 1 unit PRBC on 6/20  H&H today 7.3/23.3 Plt 44.   Type & Screen   No s/s of bleeding   Monitor CBC

## 2023-06-21 NOTE — PROGRESS NOTE ADULT - PROBLEM SELECTOR PLAN 6
SHEYLA on CKD 4  NAGMA-s/p Bicarb, improving Bicarb 19  Anasarca, s/p Albumin x 2 days.   C/w Albumin Q 8 for 2 more days.   Cr 5.27>>5.17>>5.41   Daily CMP and phos/Mg level  Nephro  following and Plan for HD in the next day or 2 pending ID clearance and IR availability.   Strict I&Os  Per Nephro, if BP normalizes patient would benefit from Lasix 80 mg instead of 40 mg.  CXR noted. Soft BP this AM. SHEYLA on CKD 4  NAGMA-s/p Bicarb, improving Bicarb 19  Anasarca, s/p Albumin x 2 days.   C/w Albumin Q 8 for 2 more days.   Cr 5.27>>5.17>>5.41>>5.43   Daily CMP and phos/Mg level  Nephro  following and Plan for HD in the next day or 2 pending ID clearance and IR availability.   Strict I&Os  Per Nephro, if BP normalizes patient would benefit from Lasix 80 mg instead of 40 mg.  CXR noted. Hypotensive this AM with improvement post Albumin.

## 2023-06-21 NOTE — CHART NOTE - NSCHARTNOTEFT_GEN_A_CORE
Spoke with Dr. Valenzuela (Nephro) and plan for possible Shiley tomorrow instead of PermaCath if patient clinical status worsens. Soft BP. Patient lethargic. ABG noted. Discussed further with intensivist and Nephro.

## 2023-06-21 NOTE — PROGRESS NOTE ADULT - PROBLEM SELECTOR PLAN 4
S/P EGD  5/30 and 6/13.   GI signed off.   + Ulcerative Esophagitis.   Zofran 4mg prn  Sucralfate 1Gm 4 times daily  Pantoprazole 40mg daily.  GI dr. Cheung  NGT in place 6/17 to right nare.   Held feeding tube in the setting of severe congestion.

## 2023-06-21 NOTE — PROGRESS NOTE ADULT - NS ATTEND AMEND GEN_ALL_CORE FT
Repeat CXR showing improving with right lung atelectasis   Cont. Chest PT and pulmonary toilet   Hemodynamic monitoring   Oxygen support to maintain saturation > 92%  Gentle hydration with albumin  Plan for permcath placement by IR for possible HD   Cont. thuy for now

## 2023-06-21 NOTE — PROGRESS NOTE ADULT - PROBLEM SELECTOR PLAN 9
Likely related to medications cefepime and Depakote.  HIT panel negative.   Repeat DIC panel +   Platelet count remains low and blue top platelet count of 31 today.   f/u daily D-dimer and fibrinogen.  Daily coags  S/p 5U cryo.   Heparin d/c secondary to dropping platelet count.  Keep Plt > 50 for invasive procedure. SDP tx of plt < 20 or bleeding.   If Fibrinogen < 100, give Cryo  Heme/onc QMA following.   s/p NG tube insertion 6/17, received 1 unit of platelet prior to insertion.

## 2023-06-21 NOTE — PROGRESS NOTE ADULT - SUBJECTIVE AND OBJECTIVE BOX
Patient is a 63y old  Male who presents with a chief complaint of sepsis (21 Jun 2023 17:01)    PATIENT IS SEEN AND EXAMINED IN MEDICAL FLOOR.  SOWMYA [    ]    ALYSIA [   ]      GT [   ]    ALLERGIES:  No Known Allergies      Daily     Daily     VITALS:    Vital Signs Last 24 Hrs  T(C): 36.6 (21 Jun 2023 14:09), Max: 37.7 (21 Jun 2023 05:10)  T(F): 97.8 (21 Jun 2023 14:09), Max: 99.9 (21 Jun 2023 05:10)  HR: 101 (21 Jun 2023 14:09) (77 - 102)  BP: 106/77 (21 Jun 2023 14:09) (98/47 - 106/77)  BP(mean): --  RR: 18 (21 Jun 2023 14:09) (18 - 20)  SpO2: 100% (21 Jun 2023 14:09) (98% - 100%)    Parameters below as of 21 Jun 2023 14:09  Patient On (Oxygen Delivery Method): nasal cannula  O2 Flow (L/min): 2      LABS:    CBC Full  -  ( 21 Jun 2023 06:04 )  WBC Count : 5.19 K/uL  RBC Count : 2.43 M/uL  Hemoglobin : 7.3 g/dL  Hematocrit : 23.3 %  Platelet Count - Automated : 44 K/uL  Mean Cell Volume : 95.9 fl  Mean Cell Hemoglobin : 30.0 pg  Mean Cell Hemoglobin Concentration : 31.3 gm/dL  Auto Neutrophil # : 3.70 K/uL  Auto Lymphocyte # : 0.88 K/uL  Auto Monocyte # : 0.41 K/uL  Auto Eosinophil # : 0.09 K/uL  Auto Basophil # : 0.01 K/uL  Auto Neutrophil % : 71.3 %  Auto Lymphocyte % : 17.0 %  Auto Monocyte % : 7.9 %  Auto Eosinophil % : 1.7 %  Auto Basophil % : 0.2 %    PT/INR - ( 21 Jun 2023 06:04 )   PT: 12.6 sec;   INR: 1.06 ratio         PTT - ( 20 Jun 2023 07:08 )  PTT:37.6 sec  06-21    147<H>  |  119<H>  |  59<H>  ----------------------------<  86  3.3<L>   |  18<L>  |  5.43<H>    Ca    10.0      21 Jun 2023 06:04  Phos  3.2     06-21  Mg     2.1     06-21    TPro  4.8<L>  /  Alb  2.1<L>  /  TBili  0.6  /  DBili  x   /  AST  <3<L>  /  ALT  <6<L>  /  AlkPhos  70  06-21    CAPILLARY BLOOD GLUCOSE      POCT Blood Glucose.: 82 mg/dL (21 Jun 2023 11:31)        LIVER FUNCTIONS - ( 21 Jun 2023 06:04 )  Alb: 2.1 g/dL / Pro: 4.8 g/dL / ALK PHOS: 70 U/L / ALT: <6 U/L DA / AST: <3 U/L / GGT: x           Creatinine Trend: 5.43<--, 5.41<--, 5.17<--, 5.27<--, 5.17<--, 5.11<--  I&O's Summary    20 Jun 2023 07:01  -  21 Jun 2023 07:00  --------------------------------------------------------  IN: 0 mL / OUT: 400 mL / NET: -400 mL    21 Jun 2023 07:01  -  21 Jun 2023 17:39  --------------------------------------------------------  IN: 0 mL / OUT: 550 mL / NET: -550 mL        ABG - ( 21 Jun 2023 16:05 )  pH, Arterial: 7.31  pH, Blood: x     /  pCO2: 35    /  pO2: 80    / HCO3: 18    / Base Excess: -7.8  /  SaO2: 98                  .Sputum Sputum  06-10 @ 10:23   Normal Respiratory Ivelisse present  --    Few polymorphonuclear leukocytes per low power field  Few Squamous epithelial cells per low power field  Rare Yeast like cells per oil power field  Few Gram Variable Rods per oil power field      .Blood Blood-Peripheral  06-10 @ 04:42   No Growth Final  --  --      .Blood Blood-Peripheral  06-10 @ 03:40   No Growth Final  --  --      .Blood Blood  06-01 @ 10:52   No Growth Final  --  --      .Blood Blood  06-01 @ 10:46   No Growth Final  --  --      .Blood Blood-Peripheral  05-28 @ 21:09   No Growth Final  --  Blood Culture PCR  Pseudomonas aeruginosa      Clean Catch Clean Catch (Midstream)  05-28 @ 21:02   >=3 organisms. Probable collection contamination.  --  --          MEDICATIONS:    MEDICATIONS  (STANDING):  acetaminophen   IVPB .. 1000 milliGRAM(s) IV Intermittent once  acetylcysteine 20%  Inhalation 4 milliLiter(s) Inhalation every 8 hours  albumin human 25% IVPB 50 milliLiter(s) IV Intermittent every 8 hours  albuterol    0.083% 2.5 milliGRAM(s) Nebulizer every 6 hours  albuterol    90 MICROgram(s) HFA Inhaler 2 Puff(s) Inhalation every 6 hours  buPROPion XL (24-Hour) . 150 milliGRAM(s) Oral daily  dorzolamide 2% Ophthalmic Solution 1 Drop(s) Right EYE <User Schedule>  epoetin madyson (PROCRIT) Injectable 28897 Unit(s) SubCutaneous <User Schedule>  finasteride 5 milliGRAM(s) Oral daily  nystatin Powder 1 Application(s) Topical two times a day  pantoprazole  Injectable 40 milliGRAM(s) IV Push every 24 hours  sucralfate suspension 1 Gram(s) Oral four times a day  tamsulosin 0.4 milliGRAM(s) Oral at bedtime  traZODone 50 milliGRAM(s) Oral at bedtime  vitamin A &amp; D Ointment 1 Application(s) Topical at bedtime      MEDICATIONS  (PRN):  ondansetron Injectable 4 milliGRAM(s) IV Push every 8 hours PRN Nausea and/or Vomiting  sodium chloride 0.9% lock flush 10 milliLiter(s) IV Push every 1 hour PRN Pre/post blood products, medications, blood draw, and to maintain line patency      REVIEW OF SYSTEMS:                           ALL ROS DONE [ X   ]    CONSTITUTIONAL:  LETHARGIC [   ], FEVER [   ], UNRESPONSIVE [   ]  CVS:  CP  [   ], SOB, [   ], PALPITATIONS [   ], DIZZYNESS [   ]  RS: COUGH [   ], SPUTUM [   ]  GI: ABDOMINAL PAIN [   ], NAUSEA [   ], VOMITINGS [   ], DIARRHEA [   ], CONSTIPATION [   ]  :  DYSURIA [   ], NOCTURIA [   ], INCREASED FREQUENCY [   ], DRIBLING [   ],  SKELETAL: PAINFUL JOINTS [   ], SWOLLEN JOINTS [   ], NECK ACHE [   ], LOW BACK ACHE [   ],  SKIN : ULCERS [   ], RASH [   ], ITCHING [   ]  CNS: HEAD ACHE [   ], DOUBLE VISION [   ], BLURRED VISION [   ], AMS / CONFUSION [   ], SEIZURES [   ], WEAKNESS [   ],TINGLING / NUMBNESS [   ]    PHYSICAL EXAMINATION:    GENERAL APPEARANCE: NO DISTRESS  HEENT:  NO PALLOR, NO  JVD,  NO   NODES, NECK SUPPLE  CVS: S1 +, S2 +,   RS: AEEB,  OCCASIONAL  RALES +,   NO RONCHI  ABD: SOFT, NT, NO, BS +   NGT+  EXT: PE+  SKIN: WARM,   SKELETAL:  ROM REDUCED AT CERVICAL & LS SPINE  CNS:  AAO X 2-3      RADIOLOGY :    RADIOLOGY AND READINGS REVIEWED    ASSESSMENT :       PLAN:  HPI:  62 y/o M, A&Ox3, bedbound, chronic daley catheter with CKD (baseline creat 3.8), COPD, CHFpEF, BPH, PVD, Lymphedema, decubitus ulcers (Sacrum/gluteal) , Anemia, poly-neuropathy, poly-arthritis, hypothyroidism, seizures and Bipolar disorder was BIB EMS from [place] for abdominal pain, [place]. Mr. Rhoades states that he developed acute onset sharp abdominal pain 4 days ago a/w "brown" emesis (witnessed by EMS). Unable to tolerate food/drink x 4 days. Having normal bowel movements, last this morning 5/28. Denies any fevers. NH paperwork states concern for obstruction.  Patient endorses BLOODY EMESIS x 4 days upon further questioning. (29 May 2023 01:36)      # [6/9] - RRT CALLED FOR HYPOTENSION, FEVER AND ANEMIA - PATIENT GIVEN PRBC TRANSFUSION, VANCOMYCIN ADDED, TRANSFERRED FROM SCU TO ICU    # CANDIDA AURIS EXPOSURE  - ON ISOLATION PRECAUTIONS    # RESOLVING SEPSIS S/T P.AERUGINOSA BACTEREMIA + COMPLICATED UTI    - S/P ON CEFEPIME [AND VANCOMYCIN], BCX [P. AERUGINOSA] AND UCX [ > 3 ORGANISMS], REPEAT BCX - NGTD  - ID CONSULT  - CRITICAL CARE EVALUATION IN PROGRESS    - S/P VASOPRESSORS    # RESOLVING GI BLEED  # ESOPHAGITIS  # ANEMIA OF CHRONIC DISEASE   # THROMBOCYTOPENIA  - MONITOR HGB, TRANSFUSION THRESHOLD HGB < 8  - TREND PLT  - S/P EGD - ULCERATIVE ESOPHAGITIS  - PPI BID  - CARAFATE QID  - ADVANCING DIET PER GI RECOMMENDATION   - GI CONSULT  - HEME/ONC CONSULT    - [6/6] - EPISODE OF NAUSEA/VOMITING - MONITORING CLOSELY, PRN ANTIEMETICS    - [6/10] - GIVEN PRBC TRANSFUSION, PLANNED FOR 1 MORE UNIT OF PRBC, PLT AND FFP, DEPAKOTE STOPPED GIVEN THROMBOCYTOPENIA    - [6/12] - PLANNED FOR POSSIBLE EGD    - 6/12 - REPEAT EGD - IMPROVING ESOPHAGITIS, SHALLOW DIVERTICULUM IN DISTAL ESOPHAGUS, SINGLE INFLAMMATORY NODULE IN ANTRUM    - 6/17 - S/P PRBC TRANSFUSION     - 6/17 - NAUSEA - NGT PLACED, GI CONSULT IN PROGRESS, REGLAN AS TOLERATED GIVEN QTC    - 6/19 - S/P PRBC    # ANEMIA DUE TO GI BLEED, ANEMIA OF CKD     # CHRONIC HYPOXIC RESPIRATORY FAILURE S/T UNDERLYING COPD  - ON PRN O2  - CRITICAL CARE EVALUATION IN PROGRESS    # THROMBOCYTOPENIA  - W/UP IN PROGRESS  - S/P PLT TRANSFUSION  - HEME/ONC CONSULT    - S/P PLT TRANSFUSION [6/17]    # AMS DUE TO ACUTE METABOLIC ENCEPHALOPATHY    # SHEYLA ON CKD STAGE 4  - ATN - WORSENING  # CHRONIC DALEY, BPH  - DALEY   - STRICT IS AND OS  - AVOID NEPHROTOXIC AGENTS  - MONITOR CR  - NEPHROLOGY CONSULT    - [6/7] - URINARY RETENTION OVERNIGHT - DALEY PLACED    - PLANNED FOR PERMACATH PLACEMENT FOR HD    # AMBULATORY DYSFUNCTION, IMPAIRED GAIT DUE TO GENERALIZED MUSCLE WEAKNESS, CERVICAL & LS SPINAL STENOSIS, POLYARTRHITIS & PERIPHERAL NEUROPATHY. OP  - FALL PRECAUTIONS    # DYSPHAGIA DUE TO METABOLIC ENCEPHALOPATHY - ON MODIFIED DIET PER SWALLOW EVAL    # HX OF SEIZURE DX  - ON VALPROIC ACID [STOPPED GIVEN THROMBOCYTOPENIA]  - DEPAKOTE    # HYPOTHYROIDISM - ON LEVOTHYROXINE    # PRESSURE ULCERS  - PATIENT IS HIGH RISK FOR PRESSURE ULCERS DESPITE PREVENTIVE MEASURES IN PLACE  - WOUND CARE CONSULT    # BIPOLAR DISORDER - ON DEPAKOTE, WELLBUTRIN XL    # GI PPX   Patient is a 63y old  Male who presents with a chief complaint of sepsis (21 Jun 2023 17:01)    PATIENT IS SEEN AND EXAMINED IN SCU.    ALLERGIES:  No Known Allergies      VITALS:    Vital Signs Last 24 Hrs  T(C): 36.6 (21 Jun 2023 14:09), Max: 37.7 (21 Jun 2023 05:10)  T(F): 97.8 (21 Jun 2023 14:09), Max: 99.9 (21 Jun 2023 05:10)  HR: 101 (21 Jun 2023 14:09) (77 - 102)  BP: 106/77 (21 Jun 2023 14:09) (98/47 - 106/77)  BP(mean): --  RR: 18 (21 Jun 2023 14:09) (18 - 20)  SpO2: 100% (21 Jun 2023 14:09) (98% - 100%)    Parameters below as of 21 Jun 2023 14:09  Patient On (Oxygen Delivery Method): nasal cannula  O2 Flow (L/min): 2      LABS:    CBC Full  -  ( 21 Jun 2023 06:04 )  WBC Count : 5.19 K/uL  RBC Count : 2.43 M/uL  Hemoglobin : 7.3 g/dL  Hematocrit : 23.3 %  Platelet Count - Automated : 44 K/uL  Mean Cell Volume : 95.9 fl  Mean Cell Hemoglobin : 30.0 pg  Mean Cell Hemoglobin Concentration : 31.3 gm/dL  Auto Neutrophil # : 3.70 K/uL  Auto Lymphocyte # : 0.88 K/uL  Auto Monocyte # : 0.41 K/uL  Auto Eosinophil # : 0.09 K/uL  Auto Basophil # : 0.01 K/uL  Auto Neutrophil % : 71.3 %  Auto Lymphocyte % : 17.0 %  Auto Monocyte % : 7.9 %  Auto Eosinophil % : 1.7 %  Auto Basophil % : 0.2 %    PT/INR - ( 21 Jun 2023 06:04 )   PT: 12.6 sec;   INR: 1.06 ratio         PTT - ( 20 Jun 2023 07:08 )  PTT:37.6 sec  06-21    147<H>  |  119<H>  |  59<H>  ----------------------------<  86  3.3<L>   |  18<L>  |  5.43<H>    Ca    10.0      21 Jun 2023 06:04  Phos  3.2     06-21  Mg     2.1     06-21    TPro  4.8<L>  /  Alb  2.1<L>  /  TBili  0.6  /  DBili  x   /  AST  <3<L>  /  ALT  <6<L>  /  AlkPhos  70  06-21    CAPILLARY BLOOD GLUCOSE      POCT Blood Glucose.: 82 mg/dL (21 Jun 2023 11:31)        LIVER FUNCTIONS - ( 21 Jun 2023 06:04 )  Alb: 2.1 g/dL / Pro: 4.8 g/dL / ALK PHOS: 70 U/L / ALT: <6 U/L DA / AST: <3 U/L / GGT: x           Creatinine Trend: 5.43<--, 5.41<--, 5.17<--, 5.27<--, 5.17<--, 5.11<--  I&O's Summary    20 Jun 2023 07:01  -  21 Jun 2023 07:00  --------------------------------------------------------  IN: 0 mL / OUT: 400 mL / NET: -400 mL    21 Jun 2023 07:01  -  21 Jun 2023 17:39  --------------------------------------------------------  IN: 0 mL / OUT: 550 mL / NET: -550 mL        ABG - ( 21 Jun 2023 16:05 )  pH, Arterial: 7.31  pH, Blood: x     /  pCO2: 35    /  pO2: 80    / HCO3: 18    / Base Excess: -7.8  /  SaO2: 98                  .Sputum Sputum  06-10 @ 10:23   Normal Respiratory Ivelisse present  --    Few polymorphonuclear leukocytes per low power field  Few Squamous epithelial cells per low power field  Rare Yeast like cells per oil power field  Few Gram Variable Rods per oil power field      .Blood Blood-Peripheral  06-10 @ 04:42   No Growth Final  --  --      .Blood Blood-Peripheral  06-10 @ 03:40   No Growth Final  --  --      .Blood Blood  06-01 @ 10:52   No Growth Final  --  --      .Blood Blood  06-01 @ 10:46   No Growth Final  --  --      .Blood Blood-Peripheral  05-28 @ 21:09   No Growth Final  --  Blood Culture PCR  Pseudomonas aeruginosa      Clean Catch Clean Catch (Midstream)  05-28 @ 21:02   >=3 organisms. Probable collection contamination.  --  --          MEDICATIONS:    MEDICATIONS  (STANDING):  acetaminophen   IVPB .. 1000 milliGRAM(s) IV Intermittent once  acetylcysteine 20%  Inhalation 4 milliLiter(s) Inhalation every 8 hours  albumin human 25% IVPB 50 milliLiter(s) IV Intermittent every 8 hours  albuterol    0.083% 2.5 milliGRAM(s) Nebulizer every 6 hours  albuterol    90 MICROgram(s) HFA Inhaler 2 Puff(s) Inhalation every 6 hours  buPROPion XL (24-Hour) . 150 milliGRAM(s) Oral daily  dorzolamide 2% Ophthalmic Solution 1 Drop(s) Right EYE <User Schedule>  epoetin madyson (PROCRIT) Injectable 00703 Unit(s) SubCutaneous <User Schedule>  finasteride 5 milliGRAM(s) Oral daily  nystatin Powder 1 Application(s) Topical two times a day  pantoprazole  Injectable 40 milliGRAM(s) IV Push every 24 hours  sucralfate suspension 1 Gram(s) Oral four times a day  tamsulosin 0.4 milliGRAM(s) Oral at bedtime  traZODone 50 milliGRAM(s) Oral at bedtime  vitamin A &amp; D Ointment 1 Application(s) Topical at bedtime      MEDICATIONS  (PRN):  ondansetron Injectable 4 milliGRAM(s) IV Push every 8 hours PRN Nausea and/or Vomiting  sodium chloride 0.9% lock flush 10 milliLiter(s) IV Push every 1 hour PRN Pre/post blood products, medications, blood draw, and to maintain line patency      REVIEW OF SYSTEMS:                           ALL ROS DONE [ X   ]    CONSTITUTIONAL:  LETHARGIC [   ], FEVER [   ], UNRESPONSIVE [   ]  CVS:  CP  [   ], SOB, [   ], PALPITATIONS [   ], DIZZYNESS [   ]  RS: COUGH [   ], SPUTUM [   ]  GI: ABDOMINAL PAIN [   ], NAUSEA [   ], VOMITINGS [   ], DIARRHEA [   ], CONSTIPATION [   ]  :  DYSURIA [   ], NOCTURIA [   ], INCREASED FREQUENCY [   ], DRIBLING [   ],  SKELETAL: PAINFUL JOINTS [   ], SWOLLEN JOINTS [   ], NECK ACHE [   ], LOW BACK ACHE [   ],  SKIN : ULCERS [   ], RASH [   ], ITCHING [   ]  CNS: HEAD ACHE [   ], DOUBLE VISION [   ], BLURRED VISION [   ], AMS / CONFUSION [   ], SEIZURES [   ], WEAKNESS [   ],TINGLING / NUMBNESS [   ]    PHYSICAL EXAMINATION:    GENERAL APPEARANCE: NO DISTRESS  HEENT:  NO PALLOR, NO  JVD,  NO   NODES, NECK SUPPLE  CVS: S1 +, S2 +,   RS: AEEB,  OCCASIONAL  RALES +,   NO RONCHI  ABD: SOFT, NT, NO, BS +   NGT+  EXT: PE+  SKIN: WARM,   SKELETAL:  ROM REDUCED AT CERVICAL & LS SPINE  CNS:  AAO X 2-3      RADIOLOGY :    RADIOLOGY AND READINGS REVIEWED    ASSESSMENT :       PLAN:  HPI:  62 y/o M, A&Ox3, bedbound, chronic daley catheter with CKD (baseline creat 3.8), COPD, CHFpEF, BPH, PVD, Lymphedema, decubitus ulcers (Sacrum/gluteal) , Anemia, poly-neuropathy, poly-arthritis, hypothyroidism, seizures and Bipolar disorder was BIB EMS from [place] for abdominal pain, [place]. Mr. Rhoades states that he developed acute onset sharp abdominal pain 4 days ago a/w "brown" emesis (witnessed by EMS). Unable to tolerate food/drink x 4 days. Having normal bowel movements, last this morning 5/28. Denies any fevers. NH paperwork states concern for obstruction.  Patient endorses BLOODY EMESIS x 4 days upon further questioning. (29 May 2023 01:36)        # CANDIDA AURIS EXPOSURE  - ON ISOLATION PRECAUTIONS    # CHRONIC HYPOXIC RESPIRATORY FAILURE S/T UNDERLYING COPD  # ATELECTASIS  - ON PRN O2  - CHEST PT  - CRITICAL CARE EVALUATION IN PROGRESS    # RESOLVING SEPSIS S/T P.AERUGINOSA BACTEREMIA + COMPLICATED UTI    - S/P ON CEFEPIME [AND VANCOMYCIN], BCX [P. AERUGINOSA] AND UCX [ > 3 ORGANISMS], REPEAT BCX - NGTD  - ID CONSULT  - CRITICAL CARE EVALUATION IN PROGRESS    - S/P VASOPRESSORS    # RESOLVING GI BLEED  # ESOPHAGITIS  # ANEMIA OF CHRONIC DISEASE, ANEMIA OF CKD   # THROMBOCYTOPENIA  - MONITOR HGB, TRANSFUSION THRESHOLD HGB < 8  - TREND PLT  - S/P EGD - ULCERATIVE ESOPHAGITIS  - PPI BID  - CARAFATE QID  - ADVANCING DIET PER GI RECOMMENDATION   - GI CONSULT  - HEME/ONC CONSULT    - [6/6] - EPISODE OF NAUSEA/VOMITING - MONITORING CLOSELY, PRN ANTIEMETICS    - [6/10] - GIVEN PRBC TRANSFUSION, PLANNED FOR 1 MORE UNIT OF PRBC, PLT AND FFP, DEPAKOTE STOPPED GIVEN THROMBOCYTOPENIA    - [6/12] - PLANNED FOR POSSIBLE EGD    - 6/12 - REPEAT EGD - IMPROVING ESOPHAGITIS, SHALLOW DIVERTICULUM IN DISTAL ESOPHAGUS, SINGLE INFLAMMATORY NODULE IN ANTRUM    - 6/17 - S/P PRBC TRANSFUSION     - 6/17 - NAUSEA - NGT PLACED, GI CONSULT IN PROGRESS, REGLAN AS TOLERATED GIVEN QTC    - 6/19 - S/P PRBC    # THROMBOCYTOPENIA  - W/UP IN PROGRESS  - S/P PLT TRANSFUSION  - HEME/ONC CONSULT    - S/P PLT TRANSFUSION [6/17]    # AMS DUE TO ACUTE METABOLIC ENCEPHALOPATHY    # SHEYLA ON CKD STAGE 4  - ATN - WORSENING  # CHRONIC DALEY, BPH  - DALEY   - STRICT IS AND OS  - AVOID NEPHROTOXIC AGENTS  - MONITOR CR  - NEPHROLOGY CONSULT    - [6/7] - URINARY RETENTION OVERNIGHT - DALEY PLACED    - PLANNED FOR PERMACATH PLACEMENT FOR HD    # AMBULATORY DYSFUNCTION, IMPAIRED GAIT DUE TO GENERALIZED MUSCLE WEAKNESS, CERVICAL & LS SPINAL STENOSIS, POLYARTRHITIS & PERIPHERAL NEUROPATHY. OP  - FALL PRECAUTIONS    # DYSPHAGIA DUE TO METABOLIC ENCEPHALOPATHY   - NPO GIVEN NAUSEA/EMESIS  - NGT PLACED  - NOTED ST EVAL    # HX OF SEIZURE DX  - ON VALPROIC ACID [STOPPED GIVEN THROMBOCYTOPENIA]  - DEPAKOTE    # HYPOTHYROIDISM - ON LEVOTHYROXINE    # PRESSURE ULCERS  - PATIENT IS HIGH RISK FOR PRESSURE ULCERS DESPITE PREVENTIVE MEASURES IN PLACE  - WOUND CARE CONSULT    # BIPOLAR DISORDER - ON DEPAKOTE, WELLBUTRIN XL    # GI PPX

## 2023-06-21 NOTE — PROGRESS NOTE ADULT - SUBJECTIVE AND OBJECTIVE BOX
GAYLA PEARCE    SCU progress note    INTERVAL HPI/OVERNIGHT EVENTS:  No acute events overnight.     FULL CODE    Covid - 19 PCR:     The 4Ms    What Matters Most: see GOC  Age appropriate Medications/Screen for High Risk Medication: Yes  Mentation: see CAM below  Mobility: defer to physical exam    The Confusion Assessment Method (CAM) Diagnostic Algorithm     1: Acute Onset or Fluctuating Course  - Is there evidence of an acute change in mental status from the patient’s baseline? Did the (abnormal) behavior  fluctuate during the day, that is, tend to come and go, or increase and decrease in severity?  [ ] YES [x ] NO     2: Inattention  - Did the patient have difficulty focusing attention, being easily distractible, or having difficulty keeping track of what was being said?  [ ] YES [x ] NO     3: Disorganized thinking  -Was the patient’s thinking disorganized or incoherent, such as rambling or irrelevant conversation, unclear or illogical flow of ideas, or unpredictable switching from subject to subject?  [ ] YES [x ] NO    4: Altered Level of consciousness?  [ ] YES [x ] NO     The diagnosis of delirium by CAM requires the presence of features 1 and 2 and either 3 or 4.    PRESSORS: [ ] YES [ ] NO    Cardiovascular:  Heart Failure  Acute   Acute on Chronic  Chronic         Pulmonary:  acetylcysteine 20%  Inhalation 4 milliLiter(s) Inhalation every 8 hours  albuterol    0.083% 2.5 milliGRAM(s) Nebulizer every 6 hours  albuterol    90 MICROgram(s) HFA Inhaler 2 Puff(s) Inhalation every 6 hours  sodium chloride 0.9% for Nebulization 3 milliLiter(s) Nebulizer every 6 hours    Hematalogic:    Other:  acetaminophen   IVPB .. 1000 milliGRAM(s) IV Intermittent once  albumin human 25% IVPB 50 milliLiter(s) IV Intermittent every 8 hours  buPROPion XL (24-Hour) . 150 milliGRAM(s) Oral daily  dorzolamide 2% Ophthalmic Solution 1 Drop(s) Right EYE <User Schedule>  epoetin madyson (PROCRIT) Injectable 79188 Unit(s) SubCutaneous <User Schedule>  finasteride 5 milliGRAM(s) Oral daily  nystatin Powder 1 Application(s) Topical two times a day  ondansetron Injectable 4 milliGRAM(s) IV Push every 8 hours PRN  pantoprazole  Injectable 40 milliGRAM(s) IV Push every 24 hours  potassium chloride    Tablet ER 20 milliEquivalent(s) Oral once  sodium chloride 0.9% lock flush 10 milliLiter(s) IV Push every 1 hour PRN  sucralfate suspension 1 Gram(s) Oral four times a day  tamsulosin 0.4 milliGRAM(s) Oral at bedtime  traZODone 50 milliGRAM(s) Oral at bedtime  vitamin A &amp; D Ointment 1 Application(s) Topical at bedtime    acetaminophen   IVPB .. 1000 milliGRAM(s) IV Intermittent once  acetylcysteine 20%  Inhalation 4 milliLiter(s) Inhalation every 8 hours  albumin human 25% IVPB 50 milliLiter(s) IV Intermittent every 8 hours  albuterol    0.083% 2.5 milliGRAM(s) Nebulizer every 6 hours  albuterol    90 MICROgram(s) HFA Inhaler 2 Puff(s) Inhalation every 6 hours  buPROPion XL (24-Hour) . 150 milliGRAM(s) Oral daily  dorzolamide 2% Ophthalmic Solution 1 Drop(s) Right EYE <User Schedule>  epoetin madyson (PROCRIT) Injectable 91034 Unit(s) SubCutaneous <User Schedule>  finasteride 5 milliGRAM(s) Oral daily  nystatin Powder 1 Application(s) Topical two times a day  ondansetron Injectable 4 milliGRAM(s) IV Push every 8 hours PRN  pantoprazole  Injectable 40 milliGRAM(s) IV Push every 24 hours  potassium chloride    Tablet ER 20 milliEquivalent(s) Oral once  sodium chloride 0.9% for Nebulization 3 milliLiter(s) Nebulizer every 6 hours  sodium chloride 0.9% lock flush 10 milliLiter(s) IV Push every 1 hour PRN  sucralfate suspension 1 Gram(s) Oral four times a day  tamsulosin 0.4 milliGRAM(s) Oral at bedtime  traZODone 50 milliGRAM(s) Oral at bedtime  vitamin A &amp; D Ointment 1 Application(s) Topical at bedtime    Drug Dosing Weight  Height (cm): 172.7 (09 Jun 2023 21:00)  Weight (kg): 84.2 (09 Jun 2023 21:00)  BMI (kg/m2): 28.2 (09 Jun 2023 21:00)  BSA (m2): 1.98 (09 Jun 2023 21:00)    CENTRAL LINE: [ ] YES [x ] NO  LOCATION:   DATE INSERTED:  REMOVE: [ ] YES [ ] NO  EXPLAIN:    HATFIELD: [x ] YES [ ] NO    DATE INSERTED:  REMOVE:  [ ] YES [ ] NO  EXPLAIN:    PAST MEDICAL & SURGICAL HISTORY:  Hypothyroid      Hyperlipidemia      Ambulatory dysfunction      Anemia      History of BPH      CKD (chronic kidney disease)      Chronic obstructive pulmonary disease (COPD)      Bipolar disorder      HLD (hyperlipidemia)      BPH (benign prostatic hyperplasia)      Chronic diastolic congestive heart failure      Lymphedema      Chronic leg pain      No significant past surgical history      06-20 @ 07:01  -  06-21 @ 07:00  --------------------------------------------------------  IN: 0 mL / OUT: 400 mL / NET: -400 mL       PHYSICAL EXAM:    GENERAL: NAD, Ill-appearing  HEAD:  Atraumatic, Normocephalic  EYES: PERRLA, conjunctiva and sclera clear  ENMT:  Dry mucous membranes.   NECK: Supple  NERVOUS SYSTEM: Lethargic and O x 3 with occasional episodes of confusion. Follows simple commands.    CHEST/LUNG: Rhonchi RUL. Absent lung sound No rales, rhonchi, wheezing, or rubs  HEART: Regular rate and rhythm; No murmurs, rubs, or gallops  ABDOMEN: Soft, Nontender, Nondistended; Bowel sounds present  EXTREMITIES:  1+ Peripheral Pulses, No clubbing, cyanosis, Anasarca. + 2 pitting edema to lower exts.   SKIN: Skin excoriation with skin tear on both thighs, anteriorly and posteriorly. Redness. Sacrum excoriation and DTI. DTI of right heel. Ecchymosis to abdomen, arms, legs.  Pale skin.       LABS:  CBC Full  -  ( 21 Jun 2023 06:04 )  WBC Count : 5.19 K/uL  RBC Count : 2.43 M/uL  Hemoglobin : 7.3 g/dL  Hematocrit : 23.3 %  Platelet Count - Automated : 44 K/uL  Mean Cell Volume : 95.9 fl  Mean Cell Hemoglobin : 30.0 pg  Mean Cell Hemoglobin Concentration : 31.3 gm/dL  Auto Neutrophil # : 3.70 K/uL  Auto Lymphocyte # : 0.88 K/uL  Auto Monocyte # : 0.41 K/uL  Auto Eosinophil # : 0.09 K/uL  Auto Basophil # : 0.01 K/uL  Auto Neutrophil % : 71.3 %  Auto Lymphocyte % : 17.0 %  Auto Monocyte % : 7.9 %  Auto Eosinophil % : 1.7 %  Auto Basophil % : 0.2 %    06-21    147<H>  |  119<H>  |  59<H>  ----------------------------<  86  3.3<L>   |  18<L>  |  5.43<H>    Ca    10.0      21 Jun 2023 06:04  Phos  3.2     06-21  Mg     2.1     06-21    TPro  4.8<L>  /  Alb  2.1<L>  /  TBili  0.6  /  DBili  x   /  AST  <3<L>  /  ALT  <6<L>  /  AlkPhos  70  06-21    PT/INR - ( 21 Jun 2023 06:04 )   PT: 12.6 sec;   INR: 1.06 ratio      PTT - ( 20 Jun 2023 07:08 )  PTT:37.6 sec  Urinalysis Basic - ( 21 Jun 2023 06:04 )    Color: x / Appearance: x / SG: x / pH: x  Gluc: 86 mg/dL / Ketone: x  / Bili: x / Urobili: x   Blood: x / Protein: x / Nitrite: x   Leuk Esterase: x / RBC: x / WBC x   Sq Epi: x / Non Sq Epi: x / Bacteria: x      [  ]  DVT Prophylaxis SCDs  [  ]  Feedign tube on hold.     RADIOLOGY & ADDITIONAL STUDIES:      < from: Xray Chest 1 View- PORTABLE-Urgent (Xray Chest 1 View- PORTABLE-Urgent .) (06.20.23 @ 09:47) >    ACC: [id number] EXAM:  XR CHEST PORTABLE URGENT 1V   ORDERED BY: FABRICE WORTHY     PROCEDURE DATE:  06/20/2023          INTERPRETATION:  AP erect chest on June 20, 2023 at 8:54 AM. Patient has   history of CHF and bacteremia. Patient has thrombocytopenia and acute   anemia and vomiting.    Heart size difficult to assess.    On June 19 there is scattered infiltrate with effusion in the right lung.    Presently the right lung is felipe out. NG tube remains.    Slight infiltrate at left base has developed.    IMPRESSION: Whiteout of the right lung likely are secondary to mucous   plug. Small developing left base infiltrate.    --- End of Report ---        MAICO CHAMPAGNE MD; Attending Radiologist  This document has been electronically signed. Jun20 2023  1:40PM    < end of copied text >

## 2023-06-21 NOTE — PROGRESS NOTE ADULT - PROBLEM SELECTOR PROBLEM 12
Advance care planning

## 2023-06-21 NOTE — PROGRESS NOTE ADULT - NS ATTEND AMEND GEN_ALL_CORE FT
pt seen and examined. Chart reviewed and case d/w ACP. 63y male with ESRD admit for sepsis and treated with iv antibiotics. hematology consult for anemia and thrombocytopenia due to multifactorial. plt stable at 44 with no significant bleeding and Hb 7.3. continue monitoring and pt is on Epogen . other anemia workup done showed normal B12/folic acid and no hemolysis. continue other care as per renal and id. will f/u pt intermittently. pt is on  NGT feeding for malnutrition     MEDICATIONS  (STANDING):  acetaminophen   IVPB .. 1000 milliGRAM(s) IV Intermittent once  acetylcysteine 20%  Inhalation 4 milliLiter(s) Inhalation every 8 hours  albumin human 25% IVPB 50 milliLiter(s) IV Intermittent every 8 hours  albuterol    0.083% 2.5 milliGRAM(s) Nebulizer every 6 hours  albuterol    90 MICROgram(s) HFA Inhaler 2 Puff(s) Inhalation every 6 hours  buPROPion XL (24-Hour) . 150 milliGRAM(s) Oral daily  dorzolamide 2% Ophthalmic Solution 1 Drop(s) Right EYE <User Schedule>  epoetin madyson (PROCRIT) Injectable 25715 Unit(s) SubCutaneous <User Schedule>  finasteride 5 milliGRAM(s) Oral daily  nystatin Powder 1 Application(s) Topical two times a day  pantoprazole  Injectable 40 milliGRAM(s) IV Push every 24 hours  sucralfate suspension 1 Gram(s) Oral four times a day  tamsulosin 0.4 milliGRAM(s) Oral at bedtime  traZODone 50 milliGRAM(s) Oral at bedtime  vitamin A &amp; D Ointment 1 Application(s) Topical at bedtime    MEDICATIONS  (PRN):  ondansetron Injectable 4 milliGRAM(s) IV Push every 8 hours PRN Nausea and/or Vomiting  sodium chloride 0.9% lock flush 10 milliLiter(s) IV Push every 1 hour PRN Pre/post blood products, medications, blood draw, and to maintain line patency

## 2023-06-21 NOTE — PROGRESS NOTE ADULT - SUBJECTIVE AND OBJECTIVE BOX
63y Male    Meds:    Allergies    No Known Allergies    Intolerances        VITALS:  Vital Signs Last 24 Hrs  T(C): 36.6 (21 Jun 2023 14:09), Max: 37.7 (21 Jun 2023 05:10)  T(F): 97.8 (21 Jun 2023 14:09), Max: 99.9 (21 Jun 2023 05:10)  HR: 101 (21 Jun 2023 14:09) (77 - 102)  BP: 106/77 (21 Jun 2023 14:09) (98/47 - 106/77)  BP(mean): --  RR: 18 (21 Jun 2023 14:09) (18 - 20)  SpO2: 100% (21 Jun 2023 14:09) (98% - 100%)    Parameters below as of 21 Jun 2023 14:09  Patient On (Oxygen Delivery Method): nasal cannula  O2 Flow (L/min): 2      LABS/DIAGNOSTIC TESTS:                          7.3    5.19  )-----------( 44       ( 21 Jun 2023 06:04 )             23.3         06-21    147<H>  |  119<H>  |  59<H>  ----------------------------<  86  3.3<L>   |  18<L>  |  5.43<H>    Ca    10.0      21 Jun 2023 06:04  Phos  3.2     06-21  Mg     2.1     06-21    TPro  4.8<L>  /  Alb  2.1<L>  /  TBili  0.6  /  DBili  x   /  AST  <3<L>  /  ALT  <6<L>  /  AlkPhos  70  06-21      LIVER FUNCTIONS - ( 21 Jun 2023 06:04 )  Alb: 2.1 g/dL / Pro: 4.8 g/dL / ALK PHOS: 70 U/L / ALT: <6 U/L DA / AST: <3 U/L / GGT: x             CULTURES: .Sputum Sputum  06-10 @ 10:23   Normal Respiratory Ivelisse present  --    Few polymorphonuclear leukocytes per low power field  Few Squamous epithelial cells per low power field  Rare Yeast like cells per oil power field  Few Gram Variable Rods per oil power field      .Blood Blood-Peripheral  06-10 @ 04:42   No Growth Final  --  --      .Blood Blood-Peripheral  06-10 @ 03:40   No Growth Final  --  --      .Blood Blood  06-01 @ 10:52   No Growth Final  --  --      .Blood Blood  06-01 @ 10:46   No Growth Final  --  --      .Blood Blood-Peripheral  05-28 @ 21:09   No Growth Final  --  Blood Culture PCR  Pseudomonas aeruginosa      Clean Catch Clean Catch (Midstream)  05-28 @ 21:02   >=3 organisms. Probable collection contamination.  --  --            RADIOLOGY:      ROS:  [  ] UNABLE TO ELICIT 63y Male whom I was asked by his nephrologist for ID clearance to put in a Permacath this Friday. He has no fevers or chills, no Leukocytosis, all his cultures have been negative in the recent past so I not sure why he needs ID clearance. He is SOB and sounds congested and is lethargic. He is answering some questions but is tired and sleepy. His creatinine remains high though he has been putting out urine.    Meds:    Allergies    No Known Allergies    Intolerances        VITALS:  Vital Signs Last 24 Hrs  T(C): 36.6 (21 Jun 2023 14:09), Max: 37.7 (21 Jun 2023 05:10)  T(F): 97.8 (21 Jun 2023 14:09), Max: 99.9 (21 Jun 2023 05:10)  HR: 101 (21 Jun 2023 14:09) (77 - 102)  BP: 106/77 (21 Jun 2023 14:09) (98/47 - 106/77)  BP(mean): --  RR: 18 (21 Jun 2023 14:09) (18 - 20)  SpO2: 100% (21 Jun 2023 14:09) (98% - 100%)    Parameters below as of 21 Jun 2023 14:09  Patient On (Oxygen Delivery Method): nasal cannula  O2 Flow (L/min): 2      LABS/DIAGNOSTIC TESTS:                          7.3    5.19  )-----------( 44       ( 21 Jun 2023 06:04 )             23.3         06-21    147<H>  |  119<H>  |  59<H>  ----------------------------<  86  3.3<L>   |  18<L>  |  5.43<H>    Ca    10.0      21 Jun 2023 06:04  Phos  3.2     06-21  Mg     2.1     06-21    TPro  4.8<L>  /  Alb  2.1<L>  /  TBili  0.6  /  DBili  x   /  AST  <3<L>  /  ALT  <6<L>  /  AlkPhos  70  06-21      LIVER FUNCTIONS - ( 21 Jun 2023 06:04 )  Alb: 2.1 g/dL / Pro: 4.8 g/dL / ALK PHOS: 70 U/L / ALT: <6 U/L DA / AST: <3 U/L / GGT: x             CULTURES: .Sputum Sputum  06-10 @ 10:23   Normal Respiratory Ivelisse present  --    Few polymorphonuclear leukocytes per low power field  Few Squamous epithelial cells per low power field  Rare Yeast like cells per oil power field  Few Gram Variable Rods per oil power field      .Blood Blood-Peripheral  06-10 @ 04:42   No Growth Final  --  --      .Blood Blood-Peripheral  06-10 @ 03:40   No Growth Final  --  --      .Blood Blood  06-01 @ 10:52   No Growth Final  --  --      .Blood Blood  06-01 @ 10:46   No Growth Final  --  --      .Blood Blood-Peripheral  05-28 @ 21:09   No Growth Final  --  Blood Culture PCR  Pseudomonas aeruginosa      Clean Catch Clean Catch (Midstream)  05-28 @ 21:02   >=3 organisms. Probable collection contamination.  --  --            RADIOLOGY:< from: Xray Chest 1 View- PORTABLE-Urgent (Xray Chest 1 View- PORTABLE-Urgent .) (06.21.23 @ 13:33) >  ACC: [id number] EXAM:  XR CHEST PORTABLE URGENT 1V   ORDERED BY: FABRICE WORTHY     PROCEDURE DATE:  06/21/2023          INTERPRETATION:  Clinical history: 63-year-old male NG tube placement.    Portable/kyphotic view of the chest is compared to 15 minutes prior.    FINDINGS: NG tube with the tip in the stomach fundus.    Normal cardiac silhouette with no pneumothorax or acute osseous finding.    Right perihilar/lower lobe interstitial infiltrates/platelike atelectasis.    Small bilateral pleural effusions.    IMPRESSION:    NG tube with the tip in the stomach.    Right perihilar/lower lobe interstitial infiltrates/platelike atelectasis.    Small bilateral pleural effusions    --- End of Report ---            SAM GARCIA DO; Attending Radiologist  This document has been electronically signed. Jun 21 2023  1:53PM    < end of copied text >        ROS:  [  ] UNABLE TO ELICIT, limited

## 2023-06-21 NOTE — PROGRESS NOTE ADULT - PROBLEM SELECTOR PLAN 2
Cxr 06/20:CXR 06/20 shows white out of right side and slight infiltrate starting on L side. On 2L.    F/u repeat CXR today 2/2 NG tube advancement.   Mucomyst, Duoneb, 3% saline inhaler  Chest PT TID  Chest vest.   Monitor Sats

## 2023-06-21 NOTE — PROGRESS NOTE ADULT - PROBLEM SELECTOR PLAN 1
Secondary to pseudomonas bacteremia and UTI  Blood culture from 6/10 Negative.  Sputum cx 6/10 negative.   Antibiotics completed.  Continue to monitor off antibiotics.  IVF on hold 2/2 chest congestion   CXR shows right-sided atelectasis.  CXR 06/20 shows white out of right side and infiltrates starting on L side. On 2L   Lasix on hold 2/2 soft bp.   No leukocytosis. Afebrile.

## 2023-06-21 NOTE — PROGRESS NOTE ADULT - PROBLEM SELECTOR PLAN 11
Chronic Viera Failed multiple TOV.  C/w Flomax and finasteride when vomiting stops.  Monitor urine output  c/w Viera care Per Nephro.

## 2023-06-21 NOTE — PROGRESS NOTE ADULT - PROBLEM SELECTOR PLAN 12
From [facility]  SCDs for DVT prophylaxis.  PT rec TG. At this point patient unstable for PT 2/2 soft BP, thrombocytopenia and acute anemia).   Daily CBC ,CMP, coags, fibrinogen, d-dimer  No plans for HD at this time. Will re-eval.   s/p 1 PRBC and 1 unit pf platelet 6/17  S/p 1 PRBC on 6/20.   s/p NG tube insertion 6/17 to right nare.   Dietitian following.   Hem/Onc following.   Palliative following  Family meeting with Palliative and SCU team took place in patient's room today with HCP.

## 2023-06-21 NOTE — PROGRESS NOTE ADULT - SUBJECTIVE AND OBJECTIVE BOX
Patient is a 63y old  Male who presents with a chief complaint of sepsis       SUBJECTIVE / OVERNIGHT EVENTS:      MEDICATIONS  (STANDING):  acetaminophen   IVPB .. 1000 milliGRAM(s) IV Intermittent once  acetylcysteine 20%  Inhalation 4 milliLiter(s) Inhalation every 8 hours  albumin human 25% IVPB 50 milliLiter(s) IV Intermittent every 8 hours  albuterol    0.083% 2.5 milliGRAM(s) Nebulizer every 6 hours  albuterol    90 MICROgram(s) HFA Inhaler 2 Puff(s) Inhalation every 6 hours  buPROPion XL (24-Hour) . 150 milliGRAM(s) Oral daily  dorzolamide 2% Ophthalmic Solution 1 Drop(s) Right EYE <User Schedule>  epoetin madyson (PROCRIT) Injectable 31747 Unit(s) SubCutaneous <User Schedule>  finasteride 5 milliGRAM(s) Oral daily  nystatin Powder 1 Application(s) Topical two times a day  pantoprazole  Injectable 40 milliGRAM(s) IV Push every 24 hours  potassium chloride    Tablet ER 20 milliEquivalent(s) Oral once  sodium chloride 0.9% for Nebulization 3 milliLiter(s) Nebulizer every 6 hours  sucralfate suspension 1 Gram(s) Oral four times a day  tamsulosin 0.4 milliGRAM(s) Oral at bedtime  traZODone 50 milliGRAM(s) Oral at bedtime  vitamin A &amp; D Ointment 1 Application(s) Topical at bedtime    MEDICATIONS  (PRN):  ondansetron Injectable 4 milliGRAM(s) IV Push every 8 hours PRN Nausea and/or Vomiting  sodium chloride 0.9% lock flush 10 milliLiter(s) IV Push every 1 hour PRN Pre/post blood products, medications, blood draw, and to maintain line patency    CAPILLARY BLOOD GLUCOSE      POCT Blood Glucose.: 82 mg/dL (21 Jun 2023 11:31)    I&O's Summary    20 Jun 2023 07:01  -  21 Jun 2023 07:00  --------------------------------------------------------  IN: 0 mL / OUT: 400 mL / NET: -400 mL        PHYSICAL EXAM:  Vital Signs Last 24 Hrs  T(C): 37.1 (21 Jun 2023 09:20), Max: 37.7 (21 Jun 2023 05:10)  T(F): 98.8 (21 Jun 2023 09:20), Max: 99.9 (21 Jun 2023 05:10)  HR: 98 (21 Jun 2023 09:20) (77 - 102)  BP: 103/48 (21 Jun 2023 09:20) (98/47 - 115/59)  BP(mean): --  RR: 20 (21 Jun 2023 09:20) (19 - 20)  SpO2: 98% (21 Jun 2023 09:20) (98% - 98%)    Parameters below as of 21 Jun 2023 09:20  Patient On (Oxygen Delivery Method): nasal cannula  O2 Flow (L/min): 2        GEN: NAD; A and O x 3, appears weak/pale   LUNGS: scattered rhonchi  HEART: S1 S2  ABDOMEN: soft, non-tender, non-distended, + BS  EXTREMITIES: B/L UE/LE edema, hyperpigmentation, scattered ecchymosis, no petechiae seen    LABS:                        7.3    5.19  )-----------( 44       ( 21 Jun 2023 06:04 )             23.3     06-21    147<H>  |  119<H>  |  59<H>  ----------------------------<  86  3.3<L>   |  18<L>  |  5.43<H>    Ca    10.0      21 Jun 2023 06:04  Phos  3.2     06-21  Mg     2.1     06-21    TPro  4.8<L>  /  Alb  2.1<L>  /  TBili  0.6  /  DBili  x   /  AST  <3<L>  /  ALT  <6<L>  /  AlkPhos  70  06-21    PT/INR - ( 21 Jun 2023 06:04 )   PT: 12.6 sec;   INR: 1.06 ratio         PTT - ( 20 Jun 2023 07:08 )  PTT:37.6 sec      Urinalysis Basic - ( 21 Jun 2023 06:04 )    Color: x / Appearance: x / SG: x / pH: x  Gluc: 86 mg/dL / Ketone: x  / Bili: x / Urobili: x   Blood: x / Protein: x / Nitrite: x   Leuk Esterase: x / RBC: x / WBC x   Sq Epi: x / Non Sq Epi: x / Bacteria: x        SARS-CoV-2: NotDetec (10 Carlin 2023 10:35)  COVID-19 PCR: NotDetec (07 Jun 2023 06:43)  COVID-19 PCR: NotDetec (04 Jun 2023 18:40)  SARS-CoV-2: NotDetec (28 May 2023 21:02)         Patient is a 63y old  Male who presents with a chief complaint of sepsis       SUBJECTIVE / OVERNIGHT EVENTS: events noted. LOPEZ Jimenez is + as per Medicine Team      MEDICATIONS  (STANDING):  acetaminophen   IVPB .. 1000 milliGRAM(s) IV Intermittent once  acetylcysteine 20%  Inhalation 4 milliLiter(s) Inhalation every 8 hours  albumin human 25% IVPB 50 milliLiter(s) IV Intermittent every 8 hours  albuterol    0.083% 2.5 milliGRAM(s) Nebulizer every 6 hours  albuterol    90 MICROgram(s) HFA Inhaler 2 Puff(s) Inhalation every 6 hours  buPROPion XL (24-Hour) . 150 milliGRAM(s) Oral daily  dorzolamide 2% Ophthalmic Solution 1 Drop(s) Right EYE <User Schedule>  epoetin madyson (PROCRIT) Injectable 04784 Unit(s) SubCutaneous <User Schedule>  finasteride 5 milliGRAM(s) Oral daily  nystatin Powder 1 Application(s) Topical two times a day  pantoprazole  Injectable 40 milliGRAM(s) IV Push every 24 hours  potassium chloride    Tablet ER 20 milliEquivalent(s) Oral once  sodium chloride 0.9% for Nebulization 3 milliLiter(s) Nebulizer every 6 hours  sucralfate suspension 1 Gram(s) Oral four times a day  tamsulosin 0.4 milliGRAM(s) Oral at bedtime  traZODone 50 milliGRAM(s) Oral at bedtime  vitamin A &amp; D Ointment 1 Application(s) Topical at bedtime    MEDICATIONS  (PRN):  ondansetron Injectable 4 milliGRAM(s) IV Push every 8 hours PRN Nausea and/or Vomiting  sodium chloride 0.9% lock flush 10 milliLiter(s) IV Push every 1 hour PRN Pre/post blood products, medications, blood draw, and to maintain line patency    CAPILLARY BLOOD GLUCOSE      POCT Blood Glucose.: 82 mg/dL (21 Jun 2023 11:31)    I&O's Summary    20 Jun 2023 07:01  -  21 Jun 2023 07:00  --------------------------------------------------------  IN: 0 mL / OUT: 400 mL / NET: -400 mL        PHYSICAL EXAM:  Vital Signs Last 24 Hrs  T(C): 37.1 (21 Jun 2023 09:20), Max: 37.7 (21 Jun 2023 05:10)  T(F): 98.8 (21 Jun 2023 09:20), Max: 99.9 (21 Jun 2023 05:10)  HR: 98 (21 Jun 2023 09:20) (77 - 102)  BP: 103/48 (21 Jun 2023 09:20) (98/47 - 115/59)  BP(mean): --  RR: 20 (21 Jun 2023 09:20) (19 - 20)  SpO2: 98% (21 Jun 2023 09:20) (98% - 98%)    Parameters below as of 21 Jun 2023 09:20  Patient On (Oxygen Delivery Method): nasal cannula  O2 Flow (L/min): 2        GEN: NAD; A and O x 3, appears weak/pale   LUNGS: scattered rhonchi  HEART: S1 S2  ABDOMEN: soft, non-tender, non-distended, + BS, NGT+  EXTREMITIES: B/L UE/LE edema, hyperpigmentation, scattered ecchymosis, no petechiae seen    LABS:                        7.3    5.19  )-----------( 44       ( 21 Jun 2023 06:04 )             23.3     06-21    147<H>  |  119<H>  |  59<H>  ----------------------------<  86  3.3<L>   |  18<L>  |  5.43<H>    Ca    10.0      21 Jun 2023 06:04  Phos  3.2     06-21  Mg     2.1     06-21    TPro  4.8<L>  /  Alb  2.1<L>  /  TBili  0.6  /  DBili  x   /  AST  <3<L>  /  ALT  <6<L>  /  AlkPhos  70  06-21    PT/INR - ( 21 Jun 2023 06:04 )   PT: 12.6 sec;   INR: 1.06 ratio         PTT - ( 20 Jun 2023 07:08 )  PTT:37.6 sec      Urinalysis Basic - ( 21 Jun 2023 06:04 )    Color: x / Appearance: x / SG: x / pH: x  Gluc: 86 mg/dL / Ketone: x  / Bili: x / Urobili: x   Blood: x / Protein: x / Nitrite: x   Leuk Esterase: x / RBC: x / WBC x   Sq Epi: x / Non Sq Epi: x / Bacteria: x        SARS-CoV-2: NotDetec (10 Carlin 2023 10:35)  COVID-19 PCR: NotDetec (07 Jun 2023 06:43)  COVID-19 PCR: NotDetec (04 Jun 2023 18:40)  SARS-CoV-2: NotDetec (28 May 2023 21:02)         Patient is a 63y old  Male who presents with a chief complaint of sepsis       SUBJECTIVE / OVERNIGHT EVENTS: events noted. Pt not as alert as yesterday, non-verbal. He nodded "no" to pain. LOPEZ Jimenez is + as per Medicine Team      MEDICATIONS  (STANDING):  acetaminophen   IVPB .. 1000 milliGRAM(s) IV Intermittent once  acetylcysteine 20%  Inhalation 4 milliLiter(s) Inhalation every 8 hours  albumin human 25% IVPB 50 milliLiter(s) IV Intermittent every 8 hours  albuterol    0.083% 2.5 milliGRAM(s) Nebulizer every 6 hours  albuterol    90 MICROgram(s) HFA Inhaler 2 Puff(s) Inhalation every 6 hours  buPROPion XL (24-Hour) . 150 milliGRAM(s) Oral daily  dorzolamide 2% Ophthalmic Solution 1 Drop(s) Right EYE <User Schedule>  epoetin madyson (PROCRIT) Injectable 62276 Unit(s) SubCutaneous <User Schedule>  finasteride 5 milliGRAM(s) Oral daily  nystatin Powder 1 Application(s) Topical two times a day  pantoprazole  Injectable 40 milliGRAM(s) IV Push every 24 hours  potassium chloride    Tablet ER 20 milliEquivalent(s) Oral once  sodium chloride 0.9% for Nebulization 3 milliLiter(s) Nebulizer every 6 hours  sucralfate suspension 1 Gram(s) Oral four times a day  tamsulosin 0.4 milliGRAM(s) Oral at bedtime  traZODone 50 milliGRAM(s) Oral at bedtime  vitamin A &amp; D Ointment 1 Application(s) Topical at bedtime    MEDICATIONS  (PRN):  ondansetron Injectable 4 milliGRAM(s) IV Push every 8 hours PRN Nausea and/or Vomiting  sodium chloride 0.9% lock flush 10 milliLiter(s) IV Push every 1 hour PRN Pre/post blood products, medications, blood draw, and to maintain line patency    CAPILLARY BLOOD GLUCOSE      POCT Blood Glucose.: 82 mg/dL (21 Jun 2023 11:31)    I&O's Summary    20 Jun 2023 07:01  -  21 Jun 2023 07:00  --------------------------------------------------------  IN: 0 mL / OUT: 400 mL / NET: -400 mL        PHYSICAL EXAM:  Vital Signs Last 24 Hrs  T(C): 37.1 (21 Jun 2023 09:20), Max: 37.7 (21 Jun 2023 05:10)  T(F): 98.8 (21 Jun 2023 09:20), Max: 99.9 (21 Jun 2023 05:10)  HR: 98 (21 Jun 2023 09:20) (77 - 102)  BP: 103/48 (21 Jun 2023 09:20) (98/47 - 115/59)  BP(mean): --  RR: 20 (21 Jun 2023 09:20) (19 - 20)  SpO2: 98% (21 Jun 2023 09:20) (98% - 98%)    Parameters below as of 21 Jun 2023 09:20  Patient On (Oxygen Delivery Method): nasal cannula  O2 Flow (L/min): 2        GEN: NAD; A and O x 3, appears weak, ill-appearing  LUNGS: decreased BS on right, NC O2 in place  HEART: S1 S2  ABDOMEN: soft, non-tender, non-distended, + BS, NGT+  EXTREMITIES: B/L UE/LE edema, hyperpigmentation, scattered ecchymosis, no petechiae seen    LABS:                        7.3    5.19  )-----------( 44       ( 21 Jun 2023 06:04 )             23.3     06-21    147<H>  |  119<H>  |  59<H>  ----------------------------<  86  3.3<L>   |  18<L>  |  5.43<H>    Ca    10.0      21 Jun 2023 06:04  Phos  3.2     06-21  Mg     2.1     06-21    TPro  4.8<L>  /  Alb  2.1<L>  /  TBili  0.6  /  DBili  x   /  AST  <3<L>  /  ALT  <6<L>  /  AlkPhos  70  06-21    PT/INR - ( 21 Jun 2023 06:04 )   PT: 12.6 sec;   INR: 1.06 ratio         PTT - ( 20 Jun 2023 07:08 )  PTT:37.6 sec      Urinalysis Basic - ( 21 Jun 2023 06:04 )    Color: x / Appearance: x / SG: x / pH: x  Gluc: 86 mg/dL / Ketone: x  / Bili: x / Urobili: x   Blood: x / Protein: x / Nitrite: x   Leuk Esterase: x / RBC: x / WBC x   Sq Epi: x / Non Sq Epi: x / Bacteria: x        SARS-CoV-2: NotDetec (10 Carlin 2023 10:35)  COVID-19 PCR: NotDetec (07 Jun 2023 06:43)  COVID-19 PCR: NotDetec (04 Jun 2023 18:40)  SARS-CoV-2: NotDetec (28 May 2023 21:02)    Rad:  < from: Xray Chest 1 View- PORTABLE-Urgent (Xray Chest 1 View- PORTABLE-Urgent .) (06.21.23 @ 13:33) >    ACC: [id number] EXAM:  XR CHEST PORTABLE URGENT 1V   ORDERED BY: FABRICE WORTHY     PROCEDURE DATE:  06/21/2023          INTERPRETATION:  Clinical history: 63-year-old male NG tube placement.    Portable/kyphotic view of the chest is compared to 15 minutes prior.    FINDINGS: NG tube with the tip in the stomach fundus.    Normal cardiac silhouette with no pneumothorax or acute osseous finding.    Right perihilar/lower lobe interstitial infiltrates/platelike atelectasis.    Small bilateral pleural effusions.    IMPRESSION:    NG tube with the tip in the stomach.    Right perihilar/lower lobe interstitial infiltrates/platelike atelectasis.    Small bilateral pleural effusions    < end of copied text >       Patient is a 63y old  Male who presents with a chief complaint of sepsis       SUBJECTIVE / OVERNIGHT EVENTS: events noted. Pt not as alert as yesterday, non-verbal. He nodded "no" to pain. LOPEZ Jimenez is pending as per Medicine Team      MEDICATIONS  (STANDING):  acetaminophen   IVPB .. 1000 milliGRAM(s) IV Intermittent once  acetylcysteine 20%  Inhalation 4 milliLiter(s) Inhalation every 8 hours  albumin human 25% IVPB 50 milliLiter(s) IV Intermittent every 8 hours  albuterol    0.083% 2.5 milliGRAM(s) Nebulizer every 6 hours  albuterol    90 MICROgram(s) HFA Inhaler 2 Puff(s) Inhalation every 6 hours  buPROPion XL (24-Hour) . 150 milliGRAM(s) Oral daily  dorzolamide 2% Ophthalmic Solution 1 Drop(s) Right EYE <User Schedule>  epoetin madyson (PROCRIT) Injectable 64409 Unit(s) SubCutaneous <User Schedule>  finasteride 5 milliGRAM(s) Oral daily  nystatin Powder 1 Application(s) Topical two times a day  pantoprazole  Injectable 40 milliGRAM(s) IV Push every 24 hours  potassium chloride    Tablet ER 20 milliEquivalent(s) Oral once  sodium chloride 0.9% for Nebulization 3 milliLiter(s) Nebulizer every 6 hours  sucralfate suspension 1 Gram(s) Oral four times a day  tamsulosin 0.4 milliGRAM(s) Oral at bedtime  traZODone 50 milliGRAM(s) Oral at bedtime  vitamin A &amp; D Ointment 1 Application(s) Topical at bedtime    MEDICATIONS  (PRN):  ondansetron Injectable 4 milliGRAM(s) IV Push every 8 hours PRN Nausea and/or Vomiting  sodium chloride 0.9% lock flush 10 milliLiter(s) IV Push every 1 hour PRN Pre/post blood products, medications, blood draw, and to maintain line patency    CAPILLARY BLOOD GLUCOSE      POCT Blood Glucose.: 82 mg/dL (21 Jun 2023 11:31)    I&O's Summary    20 Jun 2023 07:01  -  21 Jun 2023 07:00  --------------------------------------------------------  IN: 0 mL / OUT: 400 mL / NET: -400 mL        PHYSICAL EXAM:  Vital Signs Last 24 Hrs  T(C): 37.1 (21 Jun 2023 09:20), Max: 37.7 (21 Jun 2023 05:10)  T(F): 98.8 (21 Jun 2023 09:20), Max: 99.9 (21 Jun 2023 05:10)  HR: 98 (21 Jun 2023 09:20) (77 - 102)  BP: 103/48 (21 Jun 2023 09:20) (98/47 - 115/59)  BP(mean): --  RR: 20 (21 Jun 2023 09:20) (19 - 20)  SpO2: 98% (21 Jun 2023 09:20) (98% - 98%)    Parameters below as of 21 Jun 2023 09:20  Patient On (Oxygen Delivery Method): nasal cannula  O2 Flow (L/min): 2        GEN: NAD; A and O x 3, appears weak, ill-appearing  LUNGS: decreased BS on right, NC O2 in place  HEART: S1 S2  ABDOMEN: soft, non-tender, non-distended, + BS, NGT+  EXTREMITIES: B/L UE/LE edema, hyperpigmentation, scattered ecchymosis, no petechiae seen    LABS:                        7.3    5.19  )-----------( 44       ( 21 Jun 2023 06:04 )             23.3     06-21    147<H>  |  119<H>  |  59<H>  ----------------------------<  86  3.3<L>   |  18<L>  |  5.43<H>    Ca    10.0      21 Jun 2023 06:04  Phos  3.2     06-21  Mg     2.1     06-21    TPro  4.8<L>  /  Alb  2.1<L>  /  TBili  0.6  /  DBili  x   /  AST  <3<L>  /  ALT  <6<L>  /  AlkPhos  70  06-21    PT/INR - ( 21 Jun 2023 06:04 )   PT: 12.6 sec;   INR: 1.06 ratio         PTT - ( 20 Jun 2023 07:08 )  PTT:37.6 sec      Urinalysis Basic - ( 21 Jun 2023 06:04 )    Color: x / Appearance: x / SG: x / pH: x  Gluc: 86 mg/dL / Ketone: x  / Bili: x / Urobili: x   Blood: x / Protein: x / Nitrite: x   Leuk Esterase: x / RBC: x / WBC x   Sq Epi: x / Non Sq Epi: x / Bacteria: x        SARS-CoV-2: NotDetec (10 Carlin 2023 10:35)  COVID-19 PCR: NotDetec (07 Jun 2023 06:43)  COVID-19 PCR: NotDetec (04 Jun 2023 18:40)  SARS-CoV-2: NotDetec (28 May 2023 21:02)    Rad:  < from: Xray Chest 1 View- PORTABLE-Urgent (Xray Chest 1 View- PORTABLE-Urgent .) (06.21.23 @ 13:33) >    ACC: [id number] EXAM:  XR CHEST PORTABLE URGENT 1V   ORDERED BY: FABRICE WORTHY     PROCEDURE DATE:  06/21/2023          INTERPRETATION:  Clinical history: 63-year-old male NG tube placement.    Portable/kyphotic view of the chest is compared to 15 minutes prior.    FINDINGS: NG tube with the tip in the stomach fundus.    Normal cardiac silhouette with no pneumothorax or acute osseous finding.    Right perihilar/lower lobe interstitial infiltrates/platelike atelectasis.    Small bilateral pleural effusions.    IMPRESSION:    NG tube with the tip in the stomach.    Right perihilar/lower lobe interstitial infiltrates/platelike atelectasis.    Small bilateral pleural effusions    < end of copied text >

## 2023-06-21 NOTE — PROGRESS NOTE ADULT - RESPIRATORY
rales/rhonchi
normal/clear to auscultation bilaterally/no wheezes/no rales/no rhonchi
no wheezes/no rales/rhonchi

## 2023-06-21 NOTE — PROCEDURE NOTE - NSICDXPROCEDURE_GEN_ALL_CORE_FT
PROCEDURES:  Insertion, nasogastric tube 17-Jun-2023 17:28:40 Failure to thrive, dysphagia. Nichole Apple  Insertion of nasogastric tube 21-Jun-2023 17:51:24  Nichole Apple  
PROCEDURES:  Insertion, nasogastric tube 17-Jun-2023 17:28:40 Failure to thrive, dysphagia. Nichole Apple

## 2023-06-21 NOTE — PROGRESS NOTE ADULT - PROBLEM SELECTOR PLAN 5
Na 149 >>150 >>> 146>>145.   Anasarca.   IVF on hold.   Noted Urine Na, Cr and Osmolality.  Goal of Na < 144 per nephro.   s/p NGT insertion on  6/17.   Free water via NGT, on tube feeding (on hold).   Dietitian following.   Nephro dr. Valenzuela

## 2023-06-21 NOTE — PROGRESS NOTE ADULT - ASSESSMENT
Assessment and Plan:   · Assessment	    #Normocytic Anemia  #Thrombocytopenia  GNR sepsis, hypotension d/t UTI, esophagitis  SHEYLA on CKD  repeat BCx now (-)  no active bleeding/ecchymosis  LFT's nl  elevated PT/PTT  PF4 (-)  peripheral smear NO schistocytes  hemolysis (-)  iron panel c/w ACD, hyperferritinemia likely d/t reactive process  hepatitis panel (-), repeat [id number]=8471 (pt does have polyarthritis), B/12, folate, ROSANNE, FABRICE nl, SPEP nl, RF nl  Low TSH  DIC panel was + 6/15  s/p Cryo tx given 6/15    INR wnl  Rec's:  -etiology thrombocytopenia multi-factorial s/p GNR sepsis/Esophagitis/and on valproate sodium---> now discontinued on 6/19, monitor plt count  -etiology anemia SHEYLA on CKD, infxn, nutritional, drug induced  -ESRD, plan to start HD  -H/H stable, s/p repeat EGD resolving esophagitis, no bleeding, cont PPI  -MM w/u c/w MGUS, no tx indicated  -Platelet cont stable at 44k  -no active bleeding  -repeat fibrinogen and D-dimer nl, no indication to continue to repeat levels  -Cryo indicated if fibrinogen <100   -repeat DIC panel (-), discontinue repeat w/u  -pt is on depakote which can cause thrombocytopenia,  Psych ok with discontinue depakote and continue bupropion and trazodone  for bipolar  -f/u cx for c. auris  - inflammatory markers pending  -PRBC transfusion if Hgb <7.0 or symptomatic  -GI on board, pt with esophagitis, on PPI  -Transfuse SDP if Plt <20k or active bleeding, keep platelets > 50 K for invasive procedure   ---->plan for permacath placement 6/22, transfuse 1 SDP 1 hor prior to procedure if plt count <50k or as directed by IR  ---->If invasive procedure planned recommend to use DDDAVP to decrease risk of bleeding   -avoid non-essential meds that can exacerbate plt drop (i.e. H2 blocker)  -monitor CBC daily  -always check post SDP transfusion plt level within 1 hour to confirm adequate response  [facility] had family discussion about hospice, but refused at this time    #RUE edema  doppler (-)  elevate  mgmt as per Medicine Team    Thank you for the referral. Will continue to monitor the patient.  Please call with any questions [phone]  Above reviewed with Attending Dr. Vera [facility]  [address] [facility], [address], [place], [place]  [phone]  *Note not finalized until signed by Attending Physician       Assessment and Plan:   · Assessment	    #Normocytic Anemia  #Thrombocytopenia  GNR sepsis, hypotension d/t UTI, esophagitis  SHEYLA on CKD  repeat BCx now (-)  no active bleeding/ecchymosis  LFT's nl  elevated PT/PTT  PF4 (-)  peripheral smear NO schistocytes  hemolysis (-)  iron panel c/w ACD, hyperferritinemia likely d/t reactive process  hepatitis panel (-), repeat [id number]=[id number] (pt does have polyarthritis), B/12, folate, ROSANNE, FABRICE nl, SPEP nl, RF nl  Low TSH  DIC panel was + 6/15  s/p Cryo tx given 6/15    INR wnl  s/p repeat EGD resolving esophagitis, no bleeding, cont PPI  MM w/u c/w MGUS, no tx indicated  Rec's:  -etiology thrombocytopenia multi-factorial s/p GNR sepsis/Esophagitis/and on valproate sodium---> now discontinued on 6/19, monitor plt count  -etiology anemia SHEYLA on CKD, infxn, nutritional, drug induced, C. Auris +  -ESRD, plan to start HD  -H/H stable  -Platelet cont stable at 44k  -no active bleeding  -Cryo indicated if fibrinogen <100   -repeat DIC panel (-), discontinue repeat w/u  -Psych ok with discontinue depakote and continue bupropion and trazodone  for bipolar  -PRBC transfusion if Hgb <7.0 or symptomatic  -GI on board, pt with resolving esophagitis, on PPI  -Transfuse SDP if Plt <20k or active bleeding, keep platelets > 50 K for invasive procedure   ---->plan for permacath placement 6/22, transfuse 1 SDP 1 hor prior to procedure if plt count <50k or as directed by IR  ---->If invasive procedure planned recommend to use DDDAVP to decrease risk of bleeding   -avoid non-essential meds that can exacerbate plt drop (i.e. H2 blocker)  -monitor CBC daily  -always check post SDP transfusion plt level within 1 hour to confirm adequate response  [facility] had family discussion about hospice, but refused at this time    #RUE edema  doppler (-)  elevate  mgmt as per Medicine Team    will monitor CBC and follow along prn  Thank you for the referral. Will continue to monitor the patient.  Please call with any questions [phone]  Above reviewed with Attending Dr. Gamez  [facility]  [address] [facility], [address], [place], [place]  [phone]  *Note not finalized until signed by Attending Physician       Assessment and Plan:   · Assessment	    #Normocytic Anemia  #Thrombocytopenia  GNR sepsis, hypotension d/t UTI, esophagitis  SHEYLA on CKD  repeat BCx now (-)  no active bleeding/ecchymosis  LFT's nl  elevated PT/PTT  PF4 (-)  peripheral smear NO schistocytes  hemolysis (-)  iron panel c/w ACD, hyperferritinemia likely d/t reactive process  hepatitis panel (-), repeat [id number]=1965 (pt does have polyarthritis), B/12, folate, ROSANNE, FABRICE nl, SPEP nl, RF nl  Low TSH  DIC panel was + 6/15  s/p Cryo tx given 6/15    INR wnl  s/p repeat EGD resolving esophagitis, no bleeding, cont PPI  MM w/u c/w MGUS, no tx indicated  Rec's:  -etiology thrombocytopenia multi-factorial s/p GNR sepsis/Esophagitis/and on valproate sodium---> now discontinued on 6/19, monitor plt count  -etiology anemia SHEYLA on CKD, infxn, nutritional, drug induced, C. Auris +  -GI on board, pt with resolving esophagitis, on PPI  -ESRD, plan to start HD  -H/H stable  -Platelet cont stable at 44k  -no active bleeding  -Cryo indicated if fibrinogen <100   -repeat DIC panel (-), discontinue repeat w/u  -Psych ok with discontinue depakote and continue bupropion and trazodone  for bipolar  -PRBC transfusion if Hgb <7.0 or symptomatic  -Transfuse SDP if Plt <20k or active bleeding, keep platelets > 50 K for invasive procedure   ---->plan for permacath placement 6/22, transfuse 1 SDP 1 hor prior to procedure if plt count <50k or as directed by IR  ---->If invasive procedure planned recommend to use DDDAVP to decrease risk of bleeding   -avoid non-essential meds that can exacerbate plt drop (i.e. H2 blocker)  -monitor CBC daily  -always check post SDP transfusion plt level within 1 hour to confirm adequate response  [facility] had family discussion about hospice, but refused at this time    #RUE edema  doppler (-)  elevate  mgmt as per Medicine Team    #Abnl CXR  today -->Right perihilar/lower lobe interstitial infiltrates/platelike atelectasis  low-grade fever, hypotension, hypoxia  Mgmt as per Critical Care/ Medicine Teams    will monitor CBC and follow along prn  Thank you for the referral. Will continue to monitor the patient.  Please call with any questions [phone]  Above reviewed with Attending Dr. Nelson [facility]  [address] [facility], [address], [place], [place]  [phone]  *Note not finalized until signed by Attending Physician       Assessment and Plan:   · Assessment	    #Normocytic Anemia  #Thrombocytopenia  GNR sepsis, hypotension d/t UTI, esophagitis  SHEYLA on CKD  repeat BCx now (-)  no active bleeding/ecchymosis  LFT's nl  elevated PT/PTT  PF4 (-)  peripheral smear NO schistocytes  hemolysis (-)  iron panel c/w ACD, hyperferritinemia likely d/t reactive process  hepatitis panel (-), repeat [id number]=8597 (pt does have polyarthritis), B/12, folate, ROSANNE, FABRICE nl, SPEP nl, RF nl  Low TSH  DIC panel was + 6/15  s/p Cryo tx given 6/15    INR wnl  s/p repeat EGD resolving esophagitis, no bleeding, cont PPI  MM w/u c/w MGUS, no tx indicated  Rec's:  -etiology thrombocytopenia multi-factorial s/p GNR sepsis/Esophagitis/and on valproate sodium---> now discontinued on 6/19, monitor plt count  -etiology anemia SHEYLA on CKD, infxn, nutritional, drug induced, C. Auris pending  -GI on board, pt with resolving esophagitis, on PPI  -ESRD, plan to start HD  -H/H stable  -Platelet cont stable at 44k  -no active bleeding  -Cryo indicated if fibrinogen <100   -repeat DIC panel (-), discontinue repeat w/u  -Psych ok with discontinue depakote and continue bupropion and trazodone  for bipolar  -PRBC transfusion if Hgb <7.0 or symptomatic  -Transfuse SDP if Plt <20k or active bleeding, keep platelets > 50 K for invasive procedure   ---->plan for permacath placement 6/22, transfuse 1 SDP 1 hor prior to procedure if plt count <50k or as directed by IR  ---->If invasive procedure planned recommend to use DDDAVP to decrease risk of bleeding   -avoid non-essential meds that can exacerbate plt drop (i.e. H2 blocker)  -monitor CBC daily  -always check post SDP transfusion plt level within 1 hour to confirm adequate response  [facility] had family discussion about hospice, but refused at this time    #RUE edema  doppler (-)  elevate  mgmt as per Medicine Team    #Abnl CXR  today -->Right perihilar/lower lobe interstitial infiltrates/platelike atelectasis  low-grade fever, hypotension, hypoxia  Mgmt as per Critical Care/ Medicine Teams    will monitor CBC and follow along prn  Thank you for the referral. Will continue to monitor the patient.  Please call with any questions [phone]  Above reviewed with Attending Dr. Nelson POON/[facility]  [address] [facility], [address], [place], [place]  [phone]  *Note not finalized until signed by Attending Physician

## 2023-06-21 NOTE — PROGRESS NOTE ADULT - ASSESSMENT
Renal Failure     Plan - He is cleared from an ID standpoint to get a Permacath on Friday  he does appear in need of being more aggressively diuresed prior to that and or to get a Shiley and start dialysis as soon as possible if his BP can not tolerate diuretic therapy.  reconsult prn.

## 2023-06-21 NOTE — PROGRESS NOTE ADULT - ASSESSMENT
1. SHEYLA due to ATN  -Scr unchanged 5.4mg/dL from 5.12mg/dL. off fluids. Kidney function not showing signs of renal recovery with underlying CKD. Recommend to initiate HD.   -Discussed with patient in detail (AAOx3) and understands about dialysis and would like to proceed with dialysis.  IR consulted for permacath placement on Friday.  -will start HD once access is established.   -Adjust meds to eGFR and avoid IV Gadolinium contrast,NSAIDs, and phosphate enema.  -Monitor I/O's daily.   -Monitor SMA daily.  2. CKD stage 4 most likely due to ischemic nephropathy  -baseline scr around 3.7mg/dL in Dec after ATN with renal recovery.  -Keep patient euvolemic and renal diet  -Avoid Nephrotoxic Meds/ Agents such as (NSAIDs, IV contrast, Aminoglycosides such as gentamicin, -Gadolinium contrast, Phosphate containing enemas, etc..)  -Adjust Medications according to eGFR  3. h/o Hypotension:  -bp is stable   -monitor BP.  4. Mineral Bone Disease:  -phos is okay without binders; low phos and phos supplemented.  -PTH is low and no need vitamin d analog.  5. Anemia of CKD  -hb trending down and continue epo 10,000 tiw.  -F/u CBC daily  -transfuse if HB < 7.0.  6. Hypokalemia:  -give kcl 20meq once.  -monitor K.   7. Acidosis  -off sodium bicarbonate 650mg tid.  -monitor CO2.   8. Hypernatremia due to lack of free water intake.  -Na improved  -off D5W at 50cc/hr  -on free water 250cc q6hrs.  9. Thrombocytopenia:  -workup in progress  -plan as per heme/onco     Discussed with patient in detail regarding the renal plan and care  Discussed the assessment and plan with Primary Team/Nurse

## 2023-06-21 NOTE — PROCEDURE NOTE - GENERAL PROCEDURE DETAILS
Insertion of NG tune to right nare following protocol.
Insertion of nasogastric tube to left nare using hospital protocol.

## 2023-06-21 NOTE — PROGRESS NOTE ADULT - SUBJECTIVE AND OBJECTIVE BOX
NEPHROLOGY MEDICAL CARE, [facility] - Dr. Taj Valenzuela/ Dr. Hazel Solis/ Dr. Cosmo Root/ Dr. Fang Gifford    Date of Service: 06-21-23 @ 16:56    Patient was seen and examined at bedside.    CC: patient is okay and NAD    Vital Signs Last 24 Hrs  T(C): 36.6 (21 Jun 2023 14:09), Max: 37.7 (21 Jun 2023 05:10)  T(F): 97.8 (21 Jun 2023 14:09), Max: 99.9 (21 Jun 2023 05:10)  HR: 101 (21 Jun 2023 14:09) (77 - 102)  BP: 106/77 (21 Jun 2023 14:09) (98/47 - 106/77)  BP(mean): --  RR: 18 (21 Jun 2023 14:09) (18 - 20)  SpO2: 100% (21 Jun 2023 14:09) (98% - 100%)    Parameters below as of 21 Jun 2023 14:09  Patient On (Oxygen Delivery Method): nasal cannula  O2 Flow (L/min): 2      06-20 @ 07:01  -  06-21 @ 07:00  --------------------------------------------------------  IN: 0 mL / OUT: 400 mL / NET: -400 mL    06-21 @ 07:01 - 06-21 @ 16:56  --------------------------------------------------------  IN: 0 mL / OUT: 550 mL / NET: -550 mL        PHYSICAL EXAM:  GENERAL: No acute respiratory distress. Anascarca  HEAD:  Atraumatic, Normocephalic  EYES: conjunctiva and sclera clear  ENMT: Moist mucous membranes; NGT  NECK: Supple, No JVD  NERVOUS SYSTEM:  Awake, Alert & Oriented X3  CHEST/LUNG: b/l poor air entry No rales, rhonchi, wheezing  HEART: Regular rate and rhythm; No murmurs, rubs, or gallops  ABDOMEN: Soft, Non-tender, Nondistended; Bowel sounds present  : daley's cath with clear urine.  EXTREMITIES:  3+ pedal edema   SKIN: No rashes or lesions    MEDICATIONS:  MEDICATIONS  (STANDING):  acetaminophen   IVPB .. 1000 milliGRAM(s) IV Intermittent once  acetylcysteine 20%  Inhalation 4 milliLiter(s) Inhalation every 8 hours  albumin human 25% IVPB 50 milliLiter(s) IV Intermittent every 8 hours  albuterol    0.083% 2.5 milliGRAM(s) Nebulizer every 6 hours  albuterol    90 MICROgram(s) HFA Inhaler 2 Puff(s) Inhalation every 6 hours  buPROPion XL (24-Hour) . 150 milliGRAM(s) Oral daily  dorzolamide 2% Ophthalmic Solution 1 Drop(s) Right EYE <User Schedule>  epoetin madyson (PROCRIT) Injectable 77067 Unit(s) SubCutaneous <User Schedule>  finasteride 5 milliGRAM(s) Oral daily  nystatin Powder 1 Application(s) Topical two times a day  pantoprazole  Injectable 40 milliGRAM(s) IV Push every 24 hours  potassium chloride   Powder 20 milliEquivalent(s) Oral once  sucralfate suspension 1 Gram(s) Oral four times a day  tamsulosin 0.4 milliGRAM(s) Oral at bedtime  traZODone 50 milliGRAM(s) Oral at bedtime  vitamin A &amp; D Ointment 1 Application(s) Topical at bedtime    MEDICATIONS  (PRN):  ondansetron Injectable 4 milliGRAM(s) IV Push every 8 hours PRN Nausea and/or Vomiting  sodium chloride 0.9% lock flush 10 milliLiter(s) IV Push every 1 hour PRN Pre/post blood products, medications, blood draw, and to maintain line patency          LABS:                        7.3    5.19  )-----------( 44       ( 21 Jun 2023 06:04 )             23.3     06-21    147<H>  |  119<H>  |  59<H>  ----------------------------<  86  3.3<L>   |  18<L>  |  5.43<H>    Ca    10.0      21 Jun 2023 06:04  Phos  3.2     06-21  Mg     2.1     06-21    TPro  4.8<L>  /  Alb  2.1<L>  /  TBili  0.6  /  DBili  x   /  AST  <3<L>  /  ALT  <6<L>  /  AlkPhos  70  06-21    PT/INR - ( 21 Jun 2023 06:04 )   PT: 12.6 sec;   INR: 1.06 ratio         PTT - ( 20 Jun 2023 07:08 )  PTT:37.6 sec  Urinalysis Basic - ( 21 Jun 2023 06:04 )    Color: x / Appearance: x / SG: x / pH: x  Gluc: 86 mg/dL / Ketone: x  / Bili: x / Urobili: x   Blood: x / Protein: x / Nitrite: x   Leuk Esterase: x / RBC: x / WBC x   Sq Epi: x / Non Sq Epi: x / Bacteria: x      Magnesium: 2.1 mg/dL (06-21 @ 06:04)  Phosphorus: 3.2 mg/dL (06-21 @ 06:04)    Urine studies    PTH and Vit D:

## 2023-06-21 NOTE — PROCEDURE NOTE - ADDITIONAL PROCEDURE DETAILS
CXR to confirm placement.
Provided privacy, performed hand hygiene, and put on clean gloves.  Positioned the patient in high Watt's position and support his head and shoulders with a pillow. Assessed his nostrils for obstruction and chose the nostril with better airflow for tube insertion.   Measured the distance from the tip of his nose to his earlobe to the xiphoid process. Lubricated the first 4 inches of tube with water-soluble lubricant.   Instructed patient to swallow as gently advanced tube until the measured point. Checked placement via auscultation. Secured NG tube with tape. CXR ordered for placement confirmation. Patient tolerated procedure well. No signs of bleeding. Platelet transfusion prior to NG tube insertion.
20G Extended dwell line placed via ultrasound guidance. Catheter visualized in vein via US and VBG sent to lab to confirm placement.

## 2023-06-22 NOTE — PROGRESS NOTE ADULT - PROBLEM SELECTOR PLAN 10
Continue NPO status.  Will likely need to be intubated.  Strict aspiration precautions.  S/P 2 days of 25% albumin infusions.

## 2023-06-22 NOTE — PROGRESS NOTE ADULT - NS_MD_PANP_GEN_ALL_CORE

## 2023-06-22 NOTE — PROGRESS NOTE ADULT - PROBLEM SELECTOR PLAN 6
Has received multiple units this admission. See history above.  Daily CBC with platelet count  Transfuse for Hgb less than 7.0  Keep type and screen active.

## 2023-06-22 NOTE — PROGRESS NOTE ADULT - PROBLEM SELECTOR PLAN 7
S/P EGD  5/30 and 6/13.   + Ulcerative Esophagitis.   Zofran 4mg prn  Sucralfate 1Gm 4 times daily  Pantoprazole 40mg daily.  GI dr. Cheung  NGT in place 6/17 to right nare.   Held feeding tube in the setting of severe congestion.

## 2023-06-22 NOTE — PROGRESS NOTE ADULT - PROBLEM SELECTOR PLAN 3
Hx of GIB. (hematemesis).   S/P EGD X2  5/30 and 6/12. No active bleeding found. Ulcerative esophagitis.   6/10 transfused 2U PRBCs  6/15 2U cryo given  6/17 1U PRBC and 1U platelet  6/20 1U PRBC  6/22 1U PRBC   No s/s of bleeding   Monitor CBC.

## 2023-06-22 NOTE — CHART NOTE - NSCHARTNOTEFT_GEN_A_CORE
Assessment:   63yMalePatient is a 63y old  Male who presents with a chief complaint of sepsis (22 Jun 2023 09:57) Pt visited. Pt is on Contact isolation. Pt w NG Tube. NG tube on hold. D/W NP Pt with chest Congestion. Aspiration? .  Blood Transfusion in Progress. Labs noted. Pt DG from ICU to 4 N on 6/14.      Factors impacting intake: [ ] none [ ] nausea  [ ] vomiting [ ] diarrhea [ ] constipation  [ ]chewing problems [ ] swallowing issues  [ ] other:     Diet Prescription: Diet, NPO:   Except Medications (06-21-23 @ 18:37)    Intake:  NPO     Current Weight:   % Weight Change    Pertinent Medications: MEDICATIONS  (STANDING):  acetaminophen   IVPB .. 1000 milliGRAM(s) IV Intermittent once  acetylcysteine 20%  Inhalation 4 milliLiter(s) Inhalation every 8 hours  albuterol    0.083% 2.5 milliGRAM(s) Nebulizer every 6 hours  albuterol    90 MICROgram(s) HFA Inhaler 2 Puff(s) Inhalation every 6 hours  buPROPion XL (24-Hour) . 150 milliGRAM(s) Oral daily  dorzolamide 2% Ophthalmic Solution 1 Drop(s) Right EYE <User Schedule>  epoetin madyson (PROCRIT) Injectable 55158 Unit(s) SubCutaneous <User Schedule>  finasteride 5 milliGRAM(s) Oral daily  glycopyrrolate Injectable 0.2 milliGRAM(s) IV Push every 8 hours  nystatin Powder 1 Application(s) Topical two times a day  pantoprazole  Injectable 40 milliGRAM(s) IV Push every 24 hours  sucralfate suspension 1 Gram(s) Oral four times a day  tamsulosin 0.4 milliGRAM(s) Oral at bedtime  traZODone 50 milliGRAM(s) Oral at bedtime  vitamin A &amp; D Ointment 1 Application(s) Topical at bedtime    MEDICATIONS  (PRN):  ondansetron Injectable 4 milliGRAM(s) IV Push every 8 hours PRN Nausea and/or Vomiting  sodium chloride 0.9% lock flush 10 milliLiter(s) IV Push every 1 hour PRN Pre/post blood products, medications, blood draw, and to maintain line patency    Pertinent Labs: 06-22 Na150 mmol/L<H> Glu 96 mg/dL K+ 3.2 mmol/L<L> Cr  5.43 mg/dL<H> BUN 67 mg/dL<H> 06-22 Phos 3.4 mg/dL 06-22 Alb 2.7 g/dL<L>     CAPILLARY BLOOD GLUCOSE      POCT Blood Glucose.: 92 mg/dL (22 Jun 2023 06:52)  POCT Blood Glucose.: 80 mg/dL (22 Jun 2023 00:06)  POCT Blood Glucose.: 82 mg/dL (21 Jun 2023 18:50)    Skin: B/L Heel stage 1 Pressure ulcer     Estimated Needs:   [ ] no change since previous assessment  [ ] recalculated:     Previous Nutrition Diagnosis:   [ ] Inadequate Energy Intake [ ]Inadequate Oral Intake [ ] Excessive Energy Intake   [ ] Underweight [ ] Increased Nutrient Needs [ ] Overweight/Obesity   [ ] Altered GI Function [ ] Unintended Weight Loss [ ] Food & Nutrition Related Knowledge Deficit [x ] Malnutrition  Moderate malnutrition     Nutrition Diagnosis is [ x] ongoing  [ ] resolved [ ] not applicable     New Nutrition Diagnosis: [ ] not applicable       Interventions:   Recommend  [ ] Change Diet To:  [ ] Nutrition Supplement  [x ] Nutrition Support Restart TF when  medically stable.   [ ] Other:     Monitoring and Evaluation:   [ ] PO intake [ x ] Tolerance to diet prescription [ x ] weights [ x ] labs[ x ] follow up per protocol  [ ] other:

## 2023-06-22 NOTE — PROGRESS NOTE ADULT - PROBLEM SELECTOR PLAN 11
SCD for DVT prophylaxis.  Transfuse for Hgb less than 7.0  Will need platelet transfusion before any invasive procedures.  Needs dialysis access. Vascular and IR aware  CBC, CCMP mag and phos levels daily  Multiple transfusions: 6/10 transfused 2U PRBCs   6/15 2U cryo given   6/17 1U PRBC and 1U platelet  6/20 1U PRBC   6/22 1U PRBC   Patient to be transferred to ICU for closer monitoring.

## 2023-06-22 NOTE — PROGRESS NOTE ADULT - ASSESSMENT
1. SHEYLA due to ATN  -Scr unchanged 5.4mg/dL from 5.12mg/dL. off fluids. Kidney function not showing signs of renal recovery with underlying CKD. Will initiate HD tomorrow   -Discussed with patient in detail (AAOx3) and understands about dialysis and would like to proceed with dialysis.    -will start HD once access is established tomorrow.   -Adjust meds to eGFR and avoid IV Gadolinium contrast,NSAIDs, and phosphate enema.  -Monitor I/O's daily.   -Monitor SMA daily.  2. CKD stage 4 most likely due to ischemic nephropathy  -baseline scr around 3.7mg/dL in Dec after ATN with renal recovery.  -Keep patient euvolemic and renal diet  -Avoid Nephrotoxic Meds/ Agents such as (NSAIDs, IV contrast, Aminoglycosides such as gentamicin, -Gadolinium contrast, Phosphate containing enemas, etc..)  -Adjust Medications according to eGFR  3. h/o Hypotension:  -bp is stable   -monitor BP.  4. Mineral Bone Disease:  -phos is okay without binders; low phos and phos supplemented.  -PTH is low and no need vitamin d analog.  5. Anemia of CKD  -give 1 unit of prbc. continue epo 10,000 tiw.  -F/u CBC daily  -transfuse if HB < 7.0.  6. Hypokalemia:  -give kcl 20meq once.  -monitor K.   7. Acidosis  -off sodium bicarbonate 650mg tid.  -monitor CO2.   8. Hypernatremia due to lack of free water intake.  -Na worsening  -on free water 250cc q6hrs.  9. Thrombocytopenia:  -workup in progress  -plan as per heme/onco       Discussed the assessment and plan with Primary Team/Nurse     1. SHEYLA due to ATN  -Scr unchanged 5.4mg/dL from 5.12mg/dL. off fluids. Kidney function not showing signs of renal recovery with underlying CKD. Will initiate HD tomorrow   -Discussed with patient in detail (AAOx3) and understands about dialysis and would like to proceed with dialysis.    -will start HD once access is established tomorrow.   -Adjust meds to eGFR and avoid IV Gadolinium contrast,NSAIDs, and phosphate enema.  -Monitor I/O's daily.   -Monitor SMA daily.  2. CKD stage 4 most likely due to ischemic nephropathy  -baseline scr around 3.7mg/dL in Dec after ATN with renal recovery.  -Keep patient euvolemic and renal diet  -Avoid Nephrotoxic Meds/ Agents such as (NSAIDs, IV contrast, Aminoglycosides such as gentamicin, -Gadolinium contrast, Phosphate containing enemas, etc..)  -Adjust Medications according to eGFR  3. h/o Hypotension:  -bp is stable   -monitor BP.  4. Mineral Bone Disease:  -phos is okay without binders; low phos and phos supplemented.  -PTH is low and no need vitamin d analog.  5. Anemia of CKD  -give 1 unit of prbc. continue epo 10,000 tiw.  -F/u CBC daily  -transfuse if HB < 7.0.  6. Hypokalemia:  -give kcl 20meq once.  -monitor K.   7. Acidosis  -off sodium bicarbonate 650mg tid.  -monitor CO2.   8. Hypernatremia due to lack of free water intake.  -Na worsening  -on free water 250cc q6hrs.  9. Thrombocytopenia:  -workup in progress  -plan as per heme/onco     Patient is critically ill. Time Spent: 35mins.  Discussed the assessment and plan with ICU Team/Nurse

## 2023-06-22 NOTE — CHART NOTE - NSCHARTNOTEFT_GEN_A_CORE
Patient was noted to become more altered and lethargy, and hypoxia was worsening.  Pressor requirement was increasing as well.  Decision was made to intubate. Consents (including CVC and arterial line) were obtained from the brother at bedside Patient was noted to become more altered and lethargy, and hypoxia was worsening.  Pressor requirement was increasing as well.  Decision was made to intubate. Consents (including Intubation, CVC and arterial line) were obtained from the brother at bedside

## 2023-06-22 NOTE — CHART NOTE - NSCHARTNOTEFT_GEN_A_CORE
Had extensive discussion with pt's sisters Ms. Riley and Ms. Choe. Explained to them that unfortunately Mr. Rhoades will need permanent RRT as his chances of renal recovery are very poor.  They both stated that they had the impression that HD would be something temporary. I also discussed with them disease trajectory in light of recurrent hospital admission and requirements for high level of care. Also discussed with them high risk of further decline despite medical interventions. They verbalized understanding and acknowledged that they need to have a family discussion in regards GOC/ACD.  They stated that pt's brother and HCP will be coming later today for them to have such discussion.

## 2023-06-22 NOTE — AIRWAY PLACEMENT NOTE ADULT - POST AIRWAY PLACEMENT ASSESSMENT:
breath sounds bilateral/positive end tidal CO2 noted/CXR pending breath sounds bilateral/breath sounds equal/positive end tidal CO2 noted/CXR pending

## 2023-06-22 NOTE — PROGRESS NOTE ADULT - SUBJECTIVE AND OBJECTIVE BOX
Time of Visit:  Patient seen and examined.     MEDICATIONS  (STANDING):  acetaminophen   IVPB .. 1000 milliGRAM(s) IV Intermittent once  acetylcysteine 20%  Inhalation 4 milliLiter(s) Inhalation every 4 hours  albuterol    0.083% 2.5 milliGRAM(s) Nebulizer every 4 hours  buPROPion XL (24-Hour) . 150 milliGRAM(s) Oral daily  dorzolamide 2% Ophthalmic Solution 1 Drop(s) Right EYE <User Schedule>  epoetin madyson (PROCRIT) Injectable 04978 Unit(s) SubCutaneous <User Schedule>  finasteride 5 milliGRAM(s) Oral daily  glycopyrrolate Injectable 0.2 milliGRAM(s) IV Push every 8 hours  nystatin Powder 1 Application(s) Topical two times a day  pantoprazole  Injectable 40 milliGRAM(s) IV Push every 24 hours  sucralfate suspension 1 Gram(s) Oral four times a day  tamsulosin 0.4 milliGRAM(s) Oral at bedtime  traZODone 50 milliGRAM(s) Oral at bedtime  vitamin A &amp; D Ointment 1 Application(s) Topical at bedtime      MEDICATIONS  (PRN):  ondansetron Injectable 4 milliGRAM(s) IV Push every 8 hours PRN Nausea and/or Vomiting  sodium chloride 0.9% lock flush 10 milliLiter(s) IV Push every 1 hour PRN Pre/post blood products, medications, blood draw, and to maintain line patency       Medications up to date at time of exam.      PHYSICAL EXAMINATION:  Vital Signs Last 24 Hrs  T(C): 37.1 (22 Jun 2023 13:15), Max: 37.4 (22 Jun 2023 12:30)  T(F): 98.8 (22 Jun 2023 13:15), Max: 99.3 (22 Jun 2023 12:30)  HR: 114 (22 Jun 2023 14:00) (91 - 114)  BP: 96/63 (22 Jun 2023 14:00) (87/43 - 112/48)  BP(mean): 73 (22 Jun 2023 14:00) (71 - 73)  RR: 27 (22 Jun 2023 14:00) (18 - 27)  SpO2: 93% (22 Jun 2023 14:00) (91% - 99%)    Parameters below as of 22 Jun 2023 13:15  Patient On (Oxygen Delivery Method): nasal cannula  O2 Flow (L/min): 2     (if applicable)    General : Non verbal on exam. No acute distress .      HEENT: Head is normocephalic and atraumatic. +ve NGT. No nasal tenderness.  Mucous membranes are moist.     NECK: Supple, no palpable adenopathy.    LUNGS: Non labored. No wheezing. Scattered rales.  No use of accessory muscle.     HEART: S1 S2 Regular rate and no murmur.    ABDOMEN: Soft, nontender, and nondistended.  Active bowel sounds.     : No bladder distention and tenderness.     SKIN: Warm and non diaphoretic . Pale in appearance. Sacral, rt heel DTI. Multiple ecchymosis B/L extremities.       LABS:                        6.9    5.24  )-----------( 53       ( 22 Jun 2023 06:26 )             22.0     06-22    150<H>  |  120<H>  |  67<H>  ----------------------------<  96  3.2<L>   |  18<L>  |  5.43<H>    Ca    10.2      22 Jun 2023 06:26  Phos  3.4     06-22  Mg     1.9     06-22    TPro  5.5<L>  /  Alb  2.7<L>  /  TBili  0.6  /  DBili  x   /  AST  3<L>  /  ALT  9<L>  /  AlkPhos  66  06-22    PT/INR - ( 21 Jun 2023 06:04 )   PT: 12.6 sec;   INR: 1.06 ratio           Urinalysis Basic - ( 22 Jun 2023 06:26 )    Color: x / Appearance: x / SG: x / pH: x  Gluc: 96 mg/dL / Ketone: x  / Bili: x / Urobili: x   Blood: x / Protein: x / Nitrite: x   Leuk Esterase: x / RBC: x / WBC x   Sq Epi: x / Non Sq Epi: x / Bacteria: x      ABG - ( 21 Jun 2023 16:05 )  pH, Arterial: 7.31  pH, Blood: x     /  pCO2: 35    /  pO2: 80    / HCO3: 18    / Base Excess: -7.8  /  SaO2: 98                              MICROBIOLOGY: (if applicable)    RADIOLOGY & ADDITIONAL STUDIES:  EKG:   CXR:  ECHO:    IMPRESSION: 63y Male PAST MEDICAL & SURGICAL HISTORY:  Hypothyroid      Hyperlipidemia      Ambulatory dysfunction      Anemia      History of BPH      CKD (chronic kidney disease)      Chronic obstructive pulmonary disease (COPD)      Bipolar disorder      HLD (hyperlipidemia)      BPH (benign prostatic hyperplasia)      Chronic diastolic congestive heart failure      Lymphedema      Chronic leg pain      No significant past surgical history    Impression: This is a 64 Y/O male from [facility] presented to ED for abdominal pain, nausea and vomiting brown emesis . Transferred to  ICU for Hypotension possibly from septic shock secondary to complicated UTI and esophagitis, f/w PsA bacteremia, completed 14d cefepime. 06-09-23 Had RRT due to Hypotension , Fever, Anemia , had s/p PRBC transfusion  . Now resolving Sepsis S/T  P Aeruginosa Bacteremia and complicated UTI.  Had a AMS due to toxic/metabolic encephalopathy.  Back to ICU today with Hgb 6.9 and ongoing PRBC transfusion .  Oxygen dependent due to Acute hypoxic respiratory failure with excessive secretions. CXR 06-21-23 with improvement aeration in comparison to 06-20-23 CXR with complete atelectasis Right Lung. Left base infiltrate .      Suggestion:  Continue Oxygen supplementation 3L NC . Monitor respiratory status , O2 saturation trend, likely will need intubation to protect airway.      Continue Chest vest therapy .   Continue Mucomyst Q 4 Hours. Glycopyrrolate Q 8 Hours.   Continue Albuterol via nebulization Q 4 Hours.     Serial ABGs and CXRs  Aspiration precautions with HOB elevation.

## 2023-06-22 NOTE — PROGRESS NOTE ADULT - PROBLEM SELECTOR PLAN 4
SHEYLA on CKD 4  NAGMA-s/p Bicarb, improving Bicarb 19  Anasarca, s/p Albumin x 2 days.   Will need dialysis cath placed. Will likely need 1U platelet before cath is placed.  Dr Valenzuela following.  Daily CMP and phos levels

## 2023-06-22 NOTE — PROGRESS NOTE ADULT - ASSESSMENT
63M, from [place], AOx3, bedbound, chronic FC, h/o obesity, HTN, HLD, PVD, Seizures, CKD (base SCr 3.8), anemia, BPH, Lymphedema, hypothyroidism, HFpEF, COPD, decubitus ulcer (sacrum/gluteal), polyneuropathy, polyarthritis, bipolar disorder, p/w abd pain a/w "brown” emesis and food intolerance x4d. Admitted for complicated UTI + suspected GIB.  Patient initially had BP of 88/62 but it improved with IVF. Patient admitted to poor oral intake for the past 4 days due to nausea and vomiting.  CT head was negative for acute changes. CT abd showed circumferential wall thickening of distal esophagus. PPI was started.  Patient complains of persistent nausea without vomiting. Reglan was started TID to improve GI motility. Clear liquid diet was started. Hemoglobin was stable around his baseline ~9. Pt had thrombocytopenia with concern for cefepime induced thrombocytopenia  We are following daily d-dimers and fibrinogen levels with plan to transfuse 10units cryo if PLTs < 100 or if PLTs < 150 with bleeding. PLTs have been stable in 70-50's and Heparin subQ was started for DVT prophylaxis.    Patient had chronic daley from Dec 2022 and he failed TOV. UA was positive. Patient was started on Cefepime. Blood cultures and urine cultures were sent.  Wound care was consulted for sacral pressure ulcer.    Patient presented with BUN/Cr 103/4.98 (Baseline Cr 3.6~3.8). Patient received IVF and SCr has been slowly improving. Dr. Valenzuela nephrologist is on board.    Patient presented with Hypokalemia with K 3.1->2.8, likely due to vomiting and decreased PO intake. It was repleted IV and oral.    06/16: Thrombocytopenia improving. S/p Cryo. Hypernatremic with worsening SHEYLA on CKD. Likely pre-renal. Discussed with Nephrology. Albumin and light hydration. Anasarca. Extended dwell to [facility] with redness and swelling around IV site. Afebrile.   S/p ABX.     06/17: Plt blue top 30 and drop in H&H. patient pale and lethargic. One unit of Platelet and 1 PRCBC ordered. Plan for NG tube insertion post platelet transfusion. Hypotensive and tachycardic overnight x 1 event. Improvement this AM. EKG noted. RUE venous doppler US negative for DVT. Hypernatremic with slight increase in SCr. Nephro following. Will start free water flushes once NG tube in. Dietitian consult for enteral feeding assessment and recommendations.     6/18: more alert and awake at baseline. s/p vomiting about 100ml with c/o nausea. s/p zofran. last BM 6/16, ordered dulcolax supp.  Hypernatremia improving. feeding held x few hours then resumed.     06/19: Emesis x 1. Feeding tube held. Reglan. Afebrile. Chest congestion. 3% Sodium chloride inhaler and chest PT. CXR noted and discussed findings with intensivist. Lasix held 2/2 soft bp. Discussed with Nephro. No plan for HD at this time. Thrombocytopenia worsening Discussed wit Hem/Onc. Concern that it could be drug induced (Depakote). Discussed with Psych and Depakote discontinued. Psych re-eval for capacity. Updated patient's brother on clinical condition.   06/20: Drop in Hgb overnight. Hgb 6.9.  S/p 1 PRBC. Albumin 25% Q 8 x 2 days. Nephro following. Plan for HD in the next 1 to 2 days pending ID clearance and IR availability. Patient will need Platelet transfusion to maintain Plt count > 50 prior to Perm Cath insertion. Discussion with patient, brother and Palliative care took place at bedside. Updates given on clinical status and plan of care. As of now patient and brother (HCP) wants to maintain FULL CODE status and agrees with plan for dialysis. Called IR to schedule procedure. Pending ID clearance. CXR this AM shows complete right-sided atelectasis. Plan for chest PT, Mucomyst and repositioning patient to maintain right-sided elevated. Suctioning as needed.   Patient exposed to Staph Auris and swabbed by Infection control department on 06/19/2023 by RN as guided by Samaria (infection control hospital staff). Spoke today with Spike Perez (Infection preventionist) from [facility] who advised that results should be available in 3 days to 1 week.   06/21: Drop in Hgb 7.3. Type and screen. Repeat CBC in afternoon. No obvious signs of bleeding. C/w Albumin for anasarca. Plan for PermaCath with IR. Spoke with Katty today to confirm and procedure will occur on Friday. Nephro following. Pending ID clearance for PermaCath by IR. Dr. Nolasco notified.   6/22 Hgb 6.9  Transfuse 1UPRBC.  Vascular consulted for Permacath placement.

## 2023-06-22 NOTE — PROGRESS NOTE ADULT - SUBJECTIVE AND OBJECTIVE BOX
GAYLA PEARCE    SCU progress note    INTERVAL HPI/OVERNIGHT EVENTS: ***Hgb lower today 6.9. Will transfuse 1U PRBC today.    DNR [ ]   DNI  [  ]  FULL CODE    Covid - 19 PCR: Negative 6/10    The 4Ms    What Matters Most: see GOC  Age appropriate Medications/Screen for High Risk Medication: Yes  Mentation: see CAM below  Mobility: defer to physical exam    The Confusion Assessment Method (CAM) Diagnostic Algorithm     1: Acute Onset or Fluctuating Course  - Is there evidence of an acute change in mental status from the patient’s baseline? Did the (abnormal) behavior  fluctuate during the day, that is, tend to come and go, or increase and decrease in severity?  [ ] YES [x ] NO     2: Inattention  - Did the patient have difficulty focusing attention, being easily distractible, or having difficulty keeping track of what was being said?  [ ] YES [x ] NO     3: Disorganized thinking  -Was the patient’s thinking disorganized or incoherent, such as rambling or irrelevant conversation, unclear or illogical flow of ideas, or unpredictable switching from subject to subject?  [ ] YES [x ] NO    4: Altered Level of consciousness?  [ ] YES [x ] NO    The diagnosis of delirium by CAM requires the presence of features 1 and 2 and either 3 or 4.    PRESSORS: [ ] YES [x ] NO    Cardiovascular:  Heart Failure  Acute   Acute on Chronic  Chronic         Pulmonary:  acetylcysteine 20%  Inhalation 4 milliLiter(s) Inhalation every 8 hours  albuterol    0.083% 2.5 milliGRAM(s) Nebulizer every 6 hours  albuterol    90 MICROgram(s) HFA Inhaler 2 Puff(s) Inhalation every 6 hours    Hematalogic:    Other:  acetaminophen   IVPB .. 1000 milliGRAM(s) IV Intermittent once  buPROPion XL (24-Hour) . 150 milliGRAM(s) Oral daily  dorzolamide 2% Ophthalmic Solution 1 Drop(s) Right EYE <User Schedule>  epoetin madyson (PROCRIT) Injectable 97053 Unit(s) SubCutaneous <User Schedule>  finasteride 5 milliGRAM(s) Oral daily  nystatin Powder 1 Application(s) Topical two times a day  ondansetron Injectable 4 milliGRAM(s) IV Push every 8 hours PRN  pantoprazole  Injectable 40 milliGRAM(s) IV Push every 24 hours  sodium chloride 0.9% lock flush 10 milliLiter(s) IV Push every 1 hour PRN  sucralfate suspension 1 Gram(s) Oral four times a day  tamsulosin 0.4 milliGRAM(s) Oral at bedtime  traZODone 50 milliGRAM(s) Oral at bedtime  vitamin A &amp; D Ointment 1 Application(s) Topical at bedtime    acetaminophen   IVPB .. 1000 milliGRAM(s) IV Intermittent once  acetylcysteine 20%  Inhalation 4 milliLiter(s) Inhalation every 8 hours  albuterol    0.083% 2.5 milliGRAM(s) Nebulizer every 6 hours  albuterol    90 MICROgram(s) HFA Inhaler 2 Puff(s) Inhalation every 6 hours  buPROPion XL (24-Hour) . 150 milliGRAM(s) Oral daily  dorzolamide 2% Ophthalmic Solution 1 Drop(s) Right EYE <User Schedule>  epoetin madyson (PROCRIT) Injectable 88515 Unit(s) SubCutaneous <User Schedule>  finasteride 5 milliGRAM(s) Oral daily  nystatin Powder 1 Application(s) Topical two times a day  ondansetron Injectable 4 milliGRAM(s) IV Push every 8 hours PRN  pantoprazole  Injectable 40 milliGRAM(s) IV Push every 24 hours  sodium chloride 0.9% lock flush 10 milliLiter(s) IV Push every 1 hour PRN  sucralfate suspension 1 Gram(s) Oral four times a day  tamsulosin 0.4 milliGRAM(s) Oral at bedtime  traZODone 50 milliGRAM(s) Oral at bedtime  vitamin A &amp; D Ointment 1 Application(s) Topical at bedtime    Drug Dosing Weight  Height (cm): 172.7 (09 Jun 2023 21:00)  Weight (kg): 84.2 (09 Jun 2023 21:00)  BMI (kg/m2): 28.2 (09 Jun 2023 21:00)  BSA (m2): 1.98 (09 Jun 2023 21:00)    CENTRAL LINE: [ ] YES [x ] NO  LOCATION:   DATE INSERTED:  REMOVE: [ ] YES [ ] NO  EXPLAIN:    DALEY: [x ] YES [ ] NO    DATE INSERTED:  REMOVE:  [ ] YES [x ] NO  EXPLAIN:  Chronic daley    PAST MEDICAL & SURGICAL HISTORY:  Hypothyroid      Hyperlipidemia      Ambulatory dysfunction      Anemia      History of BPH      CKD (chronic kidney disease)      Chronic obstructive pulmonary disease (COPD)      Bipolar disorder      HLD (hyperlipidemia)      BPH (benign prostatic hyperplasia)      Chronic diastolic congestive heart failure      Lymphedema      Chronic leg pain      No significant past surgical history            ABG - ( 21 Jun 2023 16:05 )  pH, Arterial: 7.31  pH, Blood: x     /  pCO2: 35    /  pO2: 80    / HCO3: 18    / Base Excess: -7.8  /  SaO2: 98                    06-21 @ 07:01  -  06-22 @ 07:00  --------------------------------------------------------  IN: 0 mL / OUT: 900 mL / NET: -900 mL            PHYSICAL EXAM:    GENERAL: NAD, pale  HEAD:  Atraumatic, Normocephalic  EYES: EOMI, PERRLA, conjunctiva and sclera clear  ENMT: No tonsillar erythema, exudates  NECK: Supple, No JVD  NERVOUS SYSTEM:  Alert & Oriented X1-2 With intermittent periods of confusion.  Follows commands, moving all extremities, although extremities weak bilaterally.  CHEST/LUNG: Diminished breath sounds bilaterally. R>L Scattered rhonchi  HEART: Regular rate and rhythm; No murmurs, rubs, or gallops  ABDOMEN: Soft, Nontender, Nondistended; Bowel sounds present  EXTREMITIES:  2+ Peripheral Pulses, No clubbing, cyanosis, or edema  LYMPH: No lymphadenopathy noted  SKIN: No rashes or lesions      LABS:  CBC Full  -  ( 22 Jun 2023 06:26 )  WBC Count : 5.24 K/uL  RBC Count : 2.31 M/uL  Hemoglobin : 6.9 g/dL  Hematocrit : 22.0 %  Platelet Count - Automated : 53 K/uL  Mean Cell Volume : 95.2 fl  Mean Cell Hemoglobin : 29.9 pg  Mean Cell Hemoglobin Concentration : 31.4 gm/dL  Auto Neutrophil # : x  Auto Lymphocyte # : x  Auto Monocyte # : x  Auto Eosinophil # : x  Auto Basophil # : x  Auto Neutrophil % : x  Auto Lymphocyte % : x  Auto Monocyte % : x  Auto Eosinophil % : x  Auto Basophil % : x    06-22    150<H>  |  120<H>  |  67<H>  ----------------------------<  96  3.2<L>   |  18<L>  |  5.43<H>    Ca    10.2      22 Jun 2023 06:26  Phos  3.4     06-22  Mg     1.9     06-22    TPro  5.5<L>  /  Alb  2.7<L>  /  TBili  0.6  /  DBili  x   /  AST  3<L>  /  ALT  9<L>  /  AlkPhos  66  06-22    PT/INR - ( 21 Jun 2023 06:04 )   PT: 12.6 sec;   INR: 1.06 ratio           Urinalysis Basic - ( 22 Jun 2023 06:26 )    Color: x / Appearance: x / SG: x / pH: x  Gluc: 96 mg/dL / Ketone: x  / Bili: x / Urobili: x   Blood: x / Protein: x / Nitrite: x   Leuk Esterase: x / RBC: x / WBC x   Sq Epi: x / Non Sq Epi: x / Bacteria: x            [  ]  DVT Prophylaxis  [  ]  Nutrition, Brand, Rate         Goal Rate        Abnormal Nutritional Status -  Malnutrition   Cachexia      Morbid Obesity BMI >/=40    RADIOLOGY & ADDITIONAL STUDIES:  ***    Goals of Care Discussion with Family/Proxy/Other   - see note from/family meeting set up for...     GAYLA PEARCE    SCU progress note    INTERVAL HPI/OVERNIGHT EVENTS: ***Hgb lower today 6.9. Will transfuse 1U PRBC today. Platelet count 53 today.    DNR [ ]   DNI  [  ]  FULL CODE    Covid - 19 PCR: Negative 6/10    The 4Ms    What Matters Most: see GOC  Age appropriate Medications/Screen for High Risk Medication: Yes  Mentation: see CAM below  Mobility: defer to physical exam    The Confusion Assessment Method (CAM) Diagnostic Algorithm     1: Acute Onset or Fluctuating Course  - Is there evidence of an acute change in mental status from the patient’s baseline? Did the (abnormal) behavior  fluctuate during the day, that is, tend to come and go, or increase and decrease in severity?  [ ] YES [x ] NO     2: Inattention  - Did the patient have difficulty focusing attention, being easily distractible, or having difficulty keeping track of what was being said?  [ ] YES [x ] NO     3: Disorganized thinking  -Was the patient’s thinking disorganized or incoherent, such as rambling or irrelevant conversation, unclear or illogical flow of ideas, or unpredictable switching from subject to subject?  [ ] YES [x ] NO    4: Altered Level of consciousness?  [ ] YES [x ] NO    The diagnosis of delirium by CAM requires the presence of features 1 and 2 and either 3 or 4.    PRESSORS: [ ] YES [x ] NO    Cardiovascular:  Heart Failure  Acute   Acute on Chronic  Chronic         Pulmonary:  acetylcysteine 20%  Inhalation 4 milliLiter(s) Inhalation every 8 hours  albuterol    0.083% 2.5 milliGRAM(s) Nebulizer every 6 hours  albuterol    90 MICROgram(s) HFA Inhaler 2 Puff(s) Inhalation every 6 hours    Hematalogic:    Other:  acetaminophen   IVPB .. 1000 milliGRAM(s) IV Intermittent once  buPROPion XL (24-Hour) . 150 milliGRAM(s) Oral daily  dorzolamide 2% Ophthalmic Solution 1 Drop(s) Right EYE <User Schedule>  epoetin madyson (PROCRIT) Injectable 02097 Unit(s) SubCutaneous <User Schedule>  finasteride 5 milliGRAM(s) Oral daily  nystatin Powder 1 Application(s) Topical two times a day  ondansetron Injectable 4 milliGRAM(s) IV Push every 8 hours PRN  pantoprazole  Injectable 40 milliGRAM(s) IV Push every 24 hours  sodium chloride 0.9% lock flush 10 milliLiter(s) IV Push every 1 hour PRN  sucralfate suspension 1 Gram(s) Oral four times a day  tamsulosin 0.4 milliGRAM(s) Oral at bedtime  traZODone 50 milliGRAM(s) Oral at bedtime  vitamin A &amp; D Ointment 1 Application(s) Topical at bedtime    acetaminophen   IVPB .. 1000 milliGRAM(s) IV Intermittent once  acetylcysteine 20%  Inhalation 4 milliLiter(s) Inhalation every 8 hours  albuterol    0.083% 2.5 milliGRAM(s) Nebulizer every 6 hours  albuterol    90 MICROgram(s) HFA Inhaler 2 Puff(s) Inhalation every 6 hours  buPROPion XL (24-Hour) . 150 milliGRAM(s) Oral daily  dorzolamide 2% Ophthalmic Solution 1 Drop(s) Right EYE <User Schedule>  epoetin madyson (PROCRIT) Injectable 24844 Unit(s) SubCutaneous <User Schedule>  finasteride 5 milliGRAM(s) Oral daily  nystatin Powder 1 Application(s) Topical two times a day  ondansetron Injectable 4 milliGRAM(s) IV Push every 8 hours PRN  pantoprazole  Injectable 40 milliGRAM(s) IV Push every 24 hours  sodium chloride 0.9% lock flush 10 milliLiter(s) IV Push every 1 hour PRN  sucralfate suspension 1 Gram(s) Oral four times a day  tamsulosin 0.4 milliGRAM(s) Oral at bedtime  traZODone 50 milliGRAM(s) Oral at bedtime  vitamin A &amp; D Ointment 1 Application(s) Topical at bedtime    Drug Dosing Weight  Height (cm): 172.7 (09 Jun 2023 21:00)  Weight (kg): 84.2 (09 Jun 2023 21:00)  BMI (kg/m2): 28.2 (09 Jun 2023 21:00)  BSA (m2): 1.98 (09 Jun 2023 21:00)    CENTRAL LINE: [ ] YES [x ] NO  LOCATION:   DATE INSERTED:  REMOVE: [ ] YES [ ] NO  EXPLAIN:    DALEY: [x ] YES [ ] NO    DATE INSERTED:  REMOVE:  [ ] YES [x ] NO  EXPLAIN:  Chronic daley    PAST MEDICAL & SURGICAL HISTORY:  Hypothyroid      Hyperlipidemia      Ambulatory dysfunction      Anemia      History of BPH      CKD (chronic kidney disease)      Chronic obstructive pulmonary disease (COPD)      Bipolar disorder      HLD (hyperlipidemia)      BPH (benign prostatic hyperplasia)      Chronic diastolic congestive heart failure      Lymphedema      Chronic leg pain      No significant past surgical history            ABG - ( 21 Jun 2023 16:05 )  pH, Arterial: 7.31  pH, Blood: x     /  pCO2: 35    /  pO2: 80    / HCO3: 18    / Base Excess: -7.8  /  SaO2: 98                    06-21 @ 07:01  -  06-22 @ 07:00  --------------------------------------------------------  IN: 0 mL / OUT: 900 mL / NET: -900 mL            PHYSICAL EXAM:    GENERAL: NAD, pale  HEAD:  Atraumatic, Normocephalic  EYES: EOMI, PERRLA, conjunctiva and sclera clear  ENMT: No tonsillar erythema, exudates. Nasal feeding tube.  NECK: Supple, No JVD  NERVOUS SYSTEM:  Alert & Oriented X1-2 With intermittent periods of confusion.  Follows commands, moving all extremities, although extremities weak bilaterally.  CHEST/LUNG: Diminished breath sounds bilaterally. R>L Scattered rhonchi. +Bibasilar crackles.  HEART: Regular rate and rhythm; No murmurs, rubs, or gallops  ABDOMEN: Soft, Nontender, Nondistended; Bowel sounds present  EXTREMITIES:  +2 edema bilaterally. +anasarca. 2+ Peripheral Pulses, No clubbing or cyanosis  LYMPH: No lymphadenopathy noted  SKIN: +Pallor. Multiple ecchymotic areas. Skin tears bilateral thighs anteriorly and posteriorly.  Sacrum DTI. DTI right heel      LABS:  CBC Full  -  ( 22 Jun 2023 06:26 )  WBC Count : 5.24 K/uL  RBC Count : 2.31 M/uL  Hemoglobin : 6.9 g/dL  Hematocrit : 22.0 %  Platelet Count - Automated : 53 K/uL  Mean Cell Volume : 95.2 fl  Mean Cell Hemoglobin : 29.9 pg  Mean Cell Hemoglobin Concentration : 31.4 gm/dL  Auto Neutrophil # : x  Auto Lymphocyte # : x  Auto Monocyte # : x  Auto Eosinophil # : x  Auto Basophil # : x  Auto Neutrophil % : x  Auto Lymphocyte % : x  Auto Monocyte % : x  Auto Eosinophil % : x  Auto Basophil % : x    06-22    150<H>  |  120<H>  |  67<H>  ----------------------------<  96  3.2<L>   |  18<L>  |  5.43<H>    Ca    10.2      22 Jun 2023 06:26  Phos  3.4     06-22  Mg     1.9     06-22    TPro  5.5<L>  /  Alb  2.7<L>  /  TBili  0.6  /  DBili  x   /  AST  3<L>  /  ALT  9<L>  /  AlkPhos  66  06-22    PT/INR - ( 21 Jun 2023 06:04 )   PT: 12.6 sec;   INR: 1.06 ratio           Urinalysis Basic - ( 22 Jun 2023 06:26 )    Color: x / Appearance: x / SG: x / pH: x  Gluc: 96 mg/dL / Ketone: x  / Bili: x / Urobili: x   Blood: x / Protein: x / Nitrite: x   Leuk Esterase: x / RBC: x / WBC x   Sq Epi: x / Non Sq Epi: x / Bacteria: x            [  ]  DVT Prophylaxis  [  ]  Nutrition, Brand, Rate         Goal Rate        Abnormal Nutritional Status -  Malnutrition   Cachexia          RADIOLOGY & ADDITIONAL STUDIES:  ***  < from: Xray Chest 1 View- PORTABLE-Urgent (Xray Chest 1 View- PORTABLE-Urgent .) (06.21.23 @ 13:35) >  FINDINGS:  Heart/Vascular: The heart size, mediastinum, hilum and aorta are within   normal limits for projection.  Pulmonary: Midline trachea. There is significant reaeration of the right   lung suggesting resolving atelectasis. Persistent opacity right base   likely represents residual atelectasis and/or effusion.  Bones: There is no fracture.  Lines and catheter: None    Impression:    There is significant reaeration of the right lung suggesting resolving   atelectasis. Persistent opacity right base likely represents residual   atelectasis and/or effusion.    NG tube folded upon itself within the esophagus. This was subsequently   repositioned. Tip is in the stomach.      < end of copied text >  < from: Xray Chest 1 View- PORTABLE-Urgent (Xray Chest 1 View- PORTABLE-Urgent .) (06.20.23 @ 09:47) >  Heart size difficult to assess.    On June 19 there is scattered infiltrate with effusion in the right lung.    Presently the right lung is felipe out. NG tube remains.    Slight infiltrate at left base has developed.    IMPRESSION: Whiteout of the right lung likely are secondary to mucous   plug. Small developing left base infiltrate.    --- End of Report ---    < end of copied text >  < from: CT Abdomen and Pelvis No Cont (05.28.23 @ 22:34) >  PROCEDURE:  CT of the Abdomen and Pelvis was performed.  Sagittal and coronal reformats were performed.    FINDINGS:  LOWER CHEST: Bibasilar subsegmental atelectasis. Circumferential wall   thickening of the distal esophagus.    LIVER: Within normal limits.  BILE DUCTS: Normal caliber.  GALLBLADDER: Cholecystectomy.  SPLEEN: Within normal limits.  PANCREAS: Within normal limits.  ADRENALS: Within normal limits.  KIDNEYS/URETERS: Redemonstrated moderate nonspecific bilateral   perinephric fat stranding. No hydronephrosis.    BLADDER: Collapsed around a Daley catheter balloon. Intravesicular gas   secondary to instrumentation. Circumferential wall thickening.  REPRODUCTIVE ORGANS: Prostate within normal limits.    BOWEL: No bowel obstruction or inflammation. Scattered colonic   diverticulosis without diverticulitis. Appendix is normal.  PERITONEUM: No ascites.  VESSELS: Infrarenal IVC filter.  RETROPERITONEUM/LYMPH NODES: No lymphadenopathy.  ABDOMINAL WALL: Small bilateral fat-containing inguinal hernias.  BONES: Old right rib fractures. Severe right hip degenerative change with   remodeling of the right femoral head. Degenerative changes of the spine.    IMPRESSION:  1. No bowel obstruction or inflammation.  2. Circumferential wall thickening of the distal esophagus which could be   due to an esophagitis.  3. Circumferential urinary bladder wall thickening which could be due to   underdistention versus a cystitis. Correlate with urinalysis.        --- End of Report ---        < end of copied text >  < from: CT Head No Cont (05.28.23 @ 22:33) >  PARENCHYMA AND VENTRICLES: No acute intracranial hemorrhage, mass effect,   or midline shift. No hydrocephalus. Prominence of the sulci and   ventricles, consistent with age-related parenchymal volume loss; the   extent of ventricular and sulcal prominence appears somewhat increased   compared to 12/4/2022. Small vessel white matter changes.  EXTRA-AXIAL: No abnormal extraaxial collection.  PARANASAL SINUSES: Layering fluid in the right sphenoid sinus and   additional mild scattered mucosal thickening, as on prior.  TYMPANOMASTOID CAVITIES: Within normal limits.  ORBITS: Bilateral cataract surgery.  CALVARIUM: Within normal limits.  MISCELLANEOUS: Intracranial atherosclerosis.    IMPRESSION:  No acute intracranial hemorrhage, mass effect, or midline shift.  Paranasal sinus mucosal disease.  The extent of ventricular and sulcal prominence appears increased   compared with 12/4/2022, indeterminate clinical significance.    --- End of Report ---    < end of copied text >  < from: US Duplex Venous Upper Ext Ltd, Right (06.17.23 @ 10:14) >  FINDINGS:    The right internal jugular, subclavian, axillary and brachial veins are   patent and compressible where applicable.  The basilic vein (superficial   vein) is patent and without thrombus.  The cephalic vein (superficial   vein) is patent and without thrombus.    Doppler examination shows normal spontaneous and phasic flow.  Subcutaneous edema.    IMPRESSION:  No evidence of right upper extremity deep venous thrombosis.    < end of copied text >    Goals of Care Discussion with Family/Proxy/Other   - see note from  6/20/23

## 2023-06-22 NOTE — PROGRESS NOTE ADULT - NUTRITIONAL ASSESSMENT
This patient has been assessed with a concern for Malnutrition and has been determined to have a diagnosis/diagnoses of Moderate protein-calorie malnutrition.    This patient is being managed with:   Diet NPO-  Except Medications  Entered: Jun 21 2023  6:37PM    The following pending diet order is being considered for treatment of Moderate protein-calorie malnutrition:  Diet NPO with Tube Feed-  Tube Feeding Modality: Nasogastric  Nepro with Carb Steady  Total Volume for 24 Hours (mL): 960  Continuous  Starting Tube Feed Rate {mL per Hour}: 20  Increase Tube Feed Rate by (mL): 10     Every 24 hours  Until Goal Tube Feed Rate (mL per Hour): 40  Tube Feed Duration (in Hours): 24  Tube Feed Start Time: 00:00  Free Water Flush  Pump   Rate (mL per Hour): 215   Frequency: Every 8 Hours  Free Water Flush Instructions:  Per Nephro  Entered: Jun 19 2023  3:12PM   This patient has been assessed with a concern for Malnutrition and has been determined to have a diagnosis/diagnoses of Severe Protein calorie malnutrition  +Anasarca  S/P 25% Albumin infusions( last 2 days.)    This patient is being managed with:   Diet NPO-  Except Medications  Entered: Jun 21 2023  6:37PM

## 2023-06-22 NOTE — PROGRESS NOTE ADULT - SUBJECTIVE AND OBJECTIVE BOX
Patient is a 63y old  Male who presents with a chief complaint of sepsis (22 Jun 2023 15:40)    PATIENT IS SEEN AND EXAMINED IN ICU.    ALLERGIES:  No Known Allergies      VITALS:    Vital Signs Last 24 Hrs  T(C): 38 (22 Jun 2023 20:00), Max: 38 (22 Jun 2023 20:00)  T(F): 100.4 (22 Jun 2023 20:00), Max: 100.4 (22 Jun 2023 20:00)  HR: 119 (22 Jun 2023 23:30) (91 - 129)  BP: 78/53 (22 Jun 2023 23:30) (77/39 - 164/142)  BP(mean): 63 (22 Jun 2023 23:30) (51 - 149)  RR: 20 (22 Jun 2023 23:30) (16 - 33)  SpO2: 97% (22 Jun 2023 23:30) (89% - 100%)    Parameters below as of 22 Jun 2023 21:30  Patient On (Oxygen Delivery Method): conventional ventilator    O2 Concentration (%): 60    LABS:    CBC Full  -  ( 22 Jun 2023 20:40 )  WBC Count : 1.85 K/uL  RBC Count : 3.18 M/uL  Hemoglobin : 9.5 g/dL  Hematocrit : 30.3 %  Platelet Count - Automated : 60 K/uL  Mean Cell Volume : 95.3 fl  Mean Cell Hemoglobin : 29.9 pg  Mean Cell Hemoglobin Concentration : 31.4 gm/dL  Auto Neutrophil # : x  Auto Lymphocyte # : x  Auto Monocyte # : x  Auto Eosinophil # : x  Auto Basophil # : x  Auto Neutrophil % : x  Auto Lymphocyte % : x  Auto Monocyte % : x  Auto Eosinophil % : x  Auto Basophil % : x    PT/INR - ( 22 Jun 2023 20:40 )   PT: 16.0 sec;   INR: 1.34 ratio         PTT - ( 22 Jun 2023 20:40 )  PTT:40.2 sec  06-22    153<H>  |  125<H>  |  70<H>  ----------------------------<  93  3.3<L>   |  18<L>  |  5.55<H>    Ca    10.3      22 Jun 2023 18:35  Phos  3.4     06-22  Mg     1.9     06-22    TPro  5.5<L>  /  Alb  2.7<L>  /  TBili  0.6  /  DBili  x   /  AST  3<L>  /  ALT  9<L>  /  AlkPhos  66  06-22    CAPILLARY BLOOD GLUCOSE      POCT Blood Glucose.: 97 mg/dL (22 Jun 2023 11:31)  POCT Blood Glucose.: 92 mg/dL (22 Jun 2023 06:52)  POCT Blood Glucose.: 80 mg/dL (22 Jun 2023 00:06)        LIVER FUNCTIONS - ( 22 Jun 2023 06:26 )  Alb: 2.7 g/dL / Pro: 5.5 g/dL / ALK PHOS: 66 U/L / ALT: 9 U/L DA / AST: 3 U/L / GGT: x           Creatinine Trend: 5.55<--, 5.43<--, 5.43<--, 5.41<--, 5.17<--, 5.27<--  I&O's Summary    21 Jun 2023 07:01  -  22 Jun 2023 07:00  --------------------------------------------------------  IN: 0 mL / OUT: 900 mL / NET: -900 mL    22 Jun 2023 07:01  -  22 Jun 2023 23:37  --------------------------------------------------------  IN: 1227.7 mL / OUT: 595 mL / NET: 632.7 mL        ABG - ( 21 Jun 2023 16:05 )  pH, Arterial: 7.31  pH, Blood: x     /  pCO2: 35    /  pO2: 80    / HCO3: 18    / Base Excess: -7.8  /  SaO2: 98                  .Sputum Sputum  06-10 @ 10:23   Normal Respiratory Ivelisse present  --    Few polymorphonuclear leukocytes per low power field  Few Squamous epithelial cells per low power field  Rare Yeast like cells per oil power field  Few Gram Variable Rods per oil power field      .Blood Blood-Peripheral  06-10 @ 04:42   No Growth Final  --  --      .Blood Blood-Peripheral  06-10 @ 03:40   No Growth Final  --  --      .Blood Blood  06-01 @ 10:52   No Growth Final  --  --      .Blood Blood  06-01 @ 10:46   No Growth Final  --  --      .Blood Blood-Peripheral  05-28 @ 21:09   No Growth Final  --  Blood Culture PCR  Pseudomonas aeruginosa      Clean Catch Clean Catch (Midstream)  05-28 @ 21:02   >=3 organisms. Probable collection contamination.  --  --          MEDICATIONS:    MEDICATIONS  (STANDING):  buPROPion XL (24-Hour) . 150 milliGRAM(s) Oral daily  dorzolamide 2% Ophthalmic Solution 1 Drop(s) Right EYE <User Schedule>  epoetin madyson (PROCRIT) Injectable 50772 Unit(s) SubCutaneous <User Schedule>  finasteride 5 milliGRAM(s) Oral daily  glycopyrrolate Injectable 0.2 milliGRAM(s) IV Push every 8 hours  norepinephrine Infusion 0.049 MICROgram(s)/kG/Min (3.95 mL/Hr) IV Continuous <Continuous>  nystatin Powder 1 Application(s) Topical two times a day  pantoprazole  Injectable 40 milliGRAM(s) IV Push every 24 hours  propofol Infusion 15 MICROgram(s)/kG/Min (7.78 mL/Hr) IV Continuous <Continuous>  sucralfate suspension 1 Gram(s) Oral four times a day  tamsulosin 0.4 milliGRAM(s) Oral at bedtime  traZODone 50 milliGRAM(s) Oral at bedtime  vitamin A &amp; D Ointment 1 Application(s) Topical at bedtime      MEDICATIONS  (PRN):  ondansetron Injectable 4 milliGRAM(s) IV Push every 8 hours PRN Nausea and/or Vomiting  sodium chloride 0.9% lock flush 10 milliLiter(s) IV Push every 1 hour PRN Pre/post blood products, medications, blood draw, and to maintain line patency      REVIEW OF SYSTEMS:                           ALL ROS DONE [ X   ]    CONSTITUTIONAL:  LETHARGIC [   ], FEVER [   ], UNRESPONSIVE [   ]  CVS:  CP  [   ], SOB, [   ], PALPITATIONS [   ], DIZZYNESS [   ]  RS: COUGH [   ], SPUTUM [   ]  GI: ABDOMINAL PAIN [   ], NAUSEA [   ], VOMITINGS [   ], DIARRHEA [   ], CONSTIPATION [   ]  :  DYSURIA [   ], NOCTURIA [   ], INCREASED FREQUENCY [   ], DRIBLING [   ],  SKELETAL: PAINFUL JOINTS [   ], SWOLLEN JOINTS [   ], NECK ACHE [   ], LOW BACK ACHE [   ],  SKIN : ULCERS [   ], RASH [   ], ITCHING [   ]  CNS: HEAD ACHE [   ], DOUBLE VISION [   ], BLURRED VISION [   ], AMS / CONFUSION [   ], SEIZURES [   ], WEAKNESS [   ],TINGLING / NUMBNESS [   ]      PHYSICAL EXAMINATION:    GENERAL APPEARANCE: NO DISTRESS  HEENT:  NO PALLOR, NO  JVD,  NO   NODES, NECK SUPPLE  CVS: S1 +, S2 +,   RS: AEEB,  OCCASIONAL  RALES +,   NO RONCHI  ABD: SOFT, NT, NO, BS +   NGT+  EXT: PE+  SKIN: WARM,   SKELETAL:  ROM REDUCED AT CERVICAL & LS SPINE  CNS:  AAO X 2      RADIOLOGY :    RADIOLOGY AND READINGS REVIEWED    ASSESSMENT :       PLAN:  HPI:  62 y/o M, A&Ox3, bedbound, chronic daley catheter with CKD (baseline creat 3.8), COPD, CHFpEF, BPH, PVD, Lymphedema, decubitus ulcers (Sacrum/gluteal) , Anemia, poly-neuropathy, poly-arthritis, hypothyroidism, seizures and Bipolar disorder was BIB EMS from [place] for abdominal pain, [place]. Mr. Rhoades states that he developed acute onset sharp abdominal pain 4 days ago a/w "brown" emesis (witnessed by EMS). Unable to tolerate food/drink x 4 days. Having normal bowel movements, last this morning 5/28. Denies any fevers. NH paperwork states concern for obstruction.  Patient endorses BLOODY EMESIS x 4 days upon further questioning. (29 May 2023 01:36)    # PROGNOSIS IS POOR. PALLIATIVE CARE AND CRITICAL CARE TEAM ADDRESSING [facility] W/ FAMILY.     # CANDIDA AURIS EXPOSURE  - ON ISOLATION PRECAUTIONS    # ACUTE ON CHRONIC HYPOXIC RESPIRATORY FAILURE S/T UNDERLYING COPD  # ATELECTASIS  - ON PRN O2  - CHEST PT  - CRITICAL CARE EVALUATION IN PROGRESS    - 6/22 - TRANSFERRING TO ICU, SUPPLEMENTAL O2, F/U CXR.  ? ASPIRATION    # RESOLVING SEPSIS S/T P.AERUGINOSA BACTEREMIA + COMPLICATED UTI    - S/P ON CEFEPIME [AND VANCOMYCIN], BCX [P. AERUGINOSA] AND UCX [ > 3 ORGANISMS], REPEAT BCX - NGTD  - ID CONSULT  - CRITICAL CARE EVALUATION IN PROGRESS    - S/P VASOPRESSORS    # RESOLVING GI BLEED  # ESOPHAGITIS  # ANEMIA OF CHRONIC DISEASE, ANEMIA OF CKD   # THROMBOCYTOPENIA  - MONITOR HGB, TRANSFUSION THRESHOLD HGB < 8  - TREND PLT  - S/P EGD - ULCERATIVE ESOPHAGITIS  - PPI BID  - CARAFATE QID  - ADVANCING DIET PER GI RECOMMENDATION   - GI CONSULT  - HEME/ONC CONSULT    - [6/6] - EPISODE OF NAUSEA/VOMITING - MONITORING CLOSELY, PRN ANTIEMETICS    - [6/10] - GIVEN PRBC TRANSFUSION, PLANNED FOR 1 MORE UNIT OF PRBC, PLT AND FFP, DEPAKOTE STOPPED GIVEN THROMBOCYTOPENIA    - [6/12] - PLANNED FOR POSSIBLE EGD    - 6/12 - REPEAT EGD - IMPROVING ESOPHAGITIS, SHALLOW DIVERTICULUM IN DISTAL ESOPHAGUS, SINGLE INFLAMMATORY NODULE IN ANTRUM    - 6/17 - S/P PRBC TRANSFUSION     - 6/17 - NAUSEA - NGT PLACED, GI CONSULT IN PROGRESS, REGLAN AS TOLERATED GIVEN QTC    - 6/19 - S/P PRBC    - 6/22 - PLANNED FOR PRBC TRANSFUSION. ? NO OVERT SIGNS OF BLEEDING, MONITORING CLOSELY    # THROMBOCYTOPENIA  - W/UP IN PROGRESS  - S/P PLT TRANSFUSION  - HEME/ONC CONSULT    - S/P PLT TRANSFUSION [6/17]    # AMS DUE TO ACUTE METABOLIC ENCEPHALOPATHY    # SHEYLA ON CKD STAGE 4  - ATN - WORSENING  # CHRONIC DALEY, BPH  - DALEY   - STRICT IS AND OS  - AVOID NEPHROTOXIC AGENTS  - MONITOR CR  - NEPHROLOGY CONSULT    - [6/7] - URINARY RETENTION OVERNIGHT - DALEY PLACED    - PLANNED FOR PERMACATH PLACEMENT FOR HD    # AMBULATORY DYSFUNCTION, IMPAIRED GAIT DUE TO GENERALIZED MUSCLE WEAKNESS, CERVICAL & LS SPINAL STENOSIS, POLYARTRHITIS & PERIPHERAL NEUROPATHY. OP  - FALL PRECAUTIONS    # DYSPHAGIA DUE TO METABOLIC ENCEPHALOPATHY   - NPO GIVEN NAUSEA/EMESIS  - NGT PLACED  - NOTED ST EVAL    # HX OF SEIZURE DX  - ON VALPROIC ACID [STOPPED GIVEN THROMBOCYTOPENIA]  - DEPAKOTE    # HYPOTHYROIDISM - ON LEVOTHYROXINE    # PRESSURE ULCERS  - PATIENT IS HIGH RISK FOR PRESSURE ULCERS DESPITE PREVENTIVE MEASURES IN PLACE  - WOUND CARE CONSULT    # BIPOLAR DISORDER - ON DEPAKOTE, WELLBUTRIN XL    # GI PPX

## 2023-06-22 NOTE — PROGRESS NOTE ADULT - NS ATTEND OPT1 GEN_ALL_CORE

## 2023-06-22 NOTE — CHART NOTE - NSCHARTNOTEFT_GEN_A_CORE
Informed patient's brother Gerámn that patient to be transferred to ICU.  Informed that patient needed to be intubated and the reason why.  Explained test results. All questions answered.  Germán stated that he can reached by phone if needed. He will be at hospital around 7pm  His sisters are coming in around 6pm.  Germán stated he wants his brother to remain a FULL CODE at this time.

## 2023-06-22 NOTE — PROGRESS NOTE ADULT - NS ATTEND AMEND GEN_ALL_CORE FT
This morning with waxing waning mentation, concern for possible aspiration  Aggressive Chest PT and pulmonary toilet   Hemodynamic monitoring   Oxygen support to maintain saturation > 92%  Transfuse 1 unit PRBC  Check post transfusion CBC  Monitor for signs of bleeding  Cont. daley for now  Likely will need HD at some point  Will transfer to ICU for closer observation as may need intubation  Family arriving today for GOC conversations

## 2023-06-22 NOTE — PROGRESS NOTE ADULT - PROBLEM SELECTOR PLAN 1
Recurrent respiratory failure  Currently on 3LPM NC. Likely will need to be intubated to protect his airway  CXR showed complete atelectasis right lung 6/20. Repeat CXR 6/21 showed improved aeration.  Continue Mucomyst, bronchodilators  Continue mechanical chest vest therapy.

## 2023-06-22 NOTE — PROGRESS NOTE ADULT - PROBLEM SELECTOR PLAN 9
Depakote d/c secondary to thrombocytopenia.  Continue buproprion xl and trazodone  Maintain safety measure.

## 2023-06-22 NOTE — PROGRESS NOTE ADULT - PROBLEM SELECTOR PLAN 2
Multiple infiltrates and atelectasis on CXR  Improving aeration.  Start glycopyrrolate for excessive secretions.  Serial ABGs and CXRs  Low threshold for intubation.

## 2023-06-22 NOTE — PROGRESS NOTE ADULT - NS ATTEND OPT1A GEN_ALL_CORE
Medical decision making

## 2023-06-22 NOTE — PROGRESS NOTE ADULT - SUBJECTIVE AND OBJECTIVE BOX
NEPHROLOGY MEDICAL CARE, [facility] - Dr. Taj Valenzuela/ Dr. Hazel Solis/ Dr. Cosmo Root/ Dr. Fang Gifford    Date of Service: 06-22-23 @ 15:40    Patient was seen and examined at bedside.    CC: patient was has respiratory distress on the floor  and transferred to the ICU and patient was better.    Vital Signs Last 24 Hrs  T(C): 37.1 (22 Jun 2023 13:15), Max: 37.4 (22 Jun 2023 12:30)  T(F): 98.8 (22 Jun 2023 13:15), Max: 99.3 (22 Jun 2023 12:30)  HR: 117 (22 Jun 2023 15:30) (91 - 117)  BP: 97/53 (22 Jun 2023 15:30) (83/53 - 112/48)  BP(mean): 64 (22 Jun 2023 15:30) (63 - 73)  RR: 22 (22 Jun 2023 15:30) (18 - 27)  SpO2: 97% (22 Jun 2023 15:30) (91% - 99%)    Parameters below as of 22 Jun 2023 13:15  Patient On (Oxygen Delivery Method): nasal cannula  O2 Flow (L/min): 2      06-21 @ 07:01  -  06-22 @ 07:00  --------------------------------------------------------  IN: 0 mL / OUT: 900 mL / NET: -900 mL    06-22 @ 07:01  -  06-22 @ 15:40  --------------------------------------------------------  IN: 0 mL / OUT: 350 mL / NET: -350 mL        PHYSICAL EXAM:  GENERAL: No acute respiratory distress. Anascarca  HEAD:  Atraumatic, Normocephalic  EYES: conjunctiva and sclera clear  ENMT: Moist mucous membranes; NGT  NECK: Supple, No JVD  NERVOUS SYSTEM:  Awake, Alert & Oriented X3  CHEST/LUNG: b/l poor air entry No rales, rhonchi, wheezing  HEART: Regular rate and rhythm; No murmurs, rubs, or gallops  ABDOMEN: Soft, Non-tender, Nondistended; Bowel sounds present  : daley's cath with clear urine.  EXTREMITIES:  3+ pedal edema   SKIN: No rashes or lesions    MEDICATIONS:  MEDICATIONS  (STANDING):  acetaminophen   IVPB .. 1000 milliGRAM(s) IV Intermittent once  acetylcysteine 20%  Inhalation 4 milliLiter(s) Inhalation every 4 hours  albuterol    0.083% 2.5 milliGRAM(s) Nebulizer every 4 hours  buPROPion XL (24-Hour) . 150 milliGRAM(s) Oral daily  dorzolamide 2% Ophthalmic Solution 1 Drop(s) Right EYE <User Schedule>  epoetin madyson (PROCRIT) Injectable 45707 Unit(s) SubCutaneous <User Schedule>  finasteride 5 milliGRAM(s) Oral daily  glycopyrrolate Injectable 0.2 milliGRAM(s) IV Push every 8 hours  nystatin Powder 1 Application(s) Topical two times a day  pantoprazole  Injectable 40 milliGRAM(s) IV Push every 24 hours  sucralfate suspension 1 Gram(s) Oral four times a day  tamsulosin 0.4 milliGRAM(s) Oral at bedtime  traZODone 50 milliGRAM(s) Oral at bedtime  vitamin A &amp; D Ointment 1 Application(s) Topical at bedtime    MEDICATIONS  (PRN):  ondansetron Injectable 4 milliGRAM(s) IV Push every 8 hours PRN Nausea and/or Vomiting  sodium chloride 0.9% lock flush 10 milliLiter(s) IV Push every 1 hour PRN Pre/post blood products, medications, blood draw, and to maintain line patency          LABS:                        6.9    5.24  )-----------( 53       ( 22 Jun 2023 06:26 )             22.0     06-22    150<H>  |  120<H>  |  67<H>  ----------------------------<  96  3.2<L>   |  18<L>  |  5.43<H>    Ca    10.2      22 Jun 2023 06:26  Phos  3.4     06-22  Mg     1.9     06-22    TPro  5.5<L>  /  Alb  2.7<L>  /  TBili  0.6  /  DBili  x   /  AST  3<L>  /  ALT  9<L>  /  AlkPhos  66  06-22    PT/INR - ( 21 Jun 2023 06:04 )   PT: 12.6 sec;   INR: 1.06 ratio           Urinalysis Basic - ( 22 Jun 2023 06:26 )    Color: x / Appearance: x / SG: x / pH: x  Gluc: 96 mg/dL / Ketone: x  / Bili: x / Urobili: x   Blood: x / Protein: x / Nitrite: x   Leuk Esterase: x / RBC: x / WBC x   Sq Epi: x / Non Sq Epi: x / Bacteria: x      Magnesium: 1.9 mg/dL (06-22 @ 06:26)  Phosphorus: 3.4 mg/dL (06-22 @ 06:26)    Urine studies    PTH and Vit D:

## 2023-06-22 NOTE — PROGRESS NOTE ADULT - PROBLEM SELECTOR PLAN 5
Likely related to medications cefepime and Depakote.  HIT panel negative.   Daily coags  S/p 5U cryo.   Heparin d/c secondary to dropping platelet count.  Keep Plt > 50 for invasive procedure. SDP tx of plt < 20 or bleeding.   If Fibrinogen < 100, give Cryo  Heme/onc QMA following.   Transfusion Hx see above.

## 2023-06-23 NOTE — PROGRESS NOTE ADULT - SUBJECTIVE AND OBJECTIVE BOX
NEPHROLOGY MEDICAL CARE, [facility] - Dr. Taj Valenzuela/ Dr. Hazel Solis/ Dr. Cosmo Root/ Dr. Fang Gifford    Date of Service: 06-23-23 @ 15:56    Patient was seen and examined at bedside.    CC: patient decompensated this morning and s/p intubated and placed on pressor.    Vital Signs Last 24 Hrs  T(C): 37.3 (23 Jun 2023 14:00), Max: 38 (22 Jun 2023 20:00)  T(F): 99.1 (23 Jun 2023 14:00), Max: 100.4 (22 Jun 2023 20:00)  HR: 97 (23 Jun 2023 14:30) (76 - 129)  BP: 80/45 (23 Jun 2023 05:00) (77/39 - 164/142)  BP(mean): 55 (23 Jun 2023 05:00) (51 - 149)  RR: 20 (23 Jun 2023 14:30) (16 - 33)  SpO2: 100% (23 Jun 2023 14:30) (89% - 100%)    Parameters below as of 22 Jun 2023 21:30  Patient On (Oxygen Delivery Method): conventional ventilator    O2 Concentration (%): 60    06-22 @ 07:01 - 06-23 @ 07:00  --------------------------------------------------------  IN: 1718.9 mL / OUT: 595 mL / NET: 1123.9 mL    06-23 @ 07:01 - 06-23 @ 15:56  --------------------------------------------------------  IN: 858.2 mL / OUT: 150 mL / NET: 708.2 mL        PHYSICAL EXAM:  GENERAL: No acute respiratory distress. Anascarca on vent  HEAD:  Atraumatic, Normocephalic  EYES: conjunctiva and sclera clear  ENMT: Moist mucous membranes; NGT; ET on vent  NECK: Supple, No JVD  NERVOUS SYSTEM: sedated.  CHEST/LUNG: b/l poor air entry No rales, rhonchi, wheezing  HEART: Regular rate and rhythm; No murmurs, rubs, or gallops  ABDOMEN: Soft, Non-tender, Nondistended; Bowel sounds present  : daley's cath with clear urine.  EXTREMITIES:  3+ pedal edema   SKIN: No rashes or lesions      MEDICATIONS:  MEDICATIONS  (STANDING):  dorzolamide 2% Ophthalmic Solution 1 Drop(s) Right EYE <User Schedule>  fentaNYL   Infusion 1 MICROgram(s)/kG/Hr (8.64 mL/Hr) IV Continuous <Continuous>  glycopyrrolate Injectable 0.2 milliGRAM(s) IV Push every 8 hours  polyethylene glycol 3350 17 Gram(s) Oral every 12 hours  propofol Infusion 15 MICROgram(s)/kG/Min (7.78 mL/Hr) IV Continuous <Continuous>  senna Syrup 10 milliLiter(s) Oral at bedtime  vitamin A &amp; D Ointment 1 Application(s) Topical at bedtime    MEDICATIONS  (PRN):  HYDROmorphone  Injectable 1 milliGRAM(s) IV Push every 3 hours PRN Severe Pain (7 - 10)  LORazepam   Injectable 1 milliGRAM(s) IV Push every 4 hours PRN Agitation          LABS:                        9.0    3.08  )-----------( 71       ( 23 Jun 2023 03:47 )             28.8     06-23    151<H>  |  125<H>  |  69<H>  ----------------------------<  117<H>  3.2<L>   |  16<L>  |  5.16<H>    Ca    9.7      23 Jun 2023 08:33  Phos  2.5     06-23  Mg     1.4     06-23    TPro  5.2<L>  /  Alb  2.1<L>  /  TBili  0.6  /  DBili  x   /  AST  8<L>  /  ALT  9<L>  /  AlkPhos  59  06-23    PT/INR - ( 22 Jun 2023 20:40 )   PT: 16.0 sec;   INR: 1.34 ratio         PTT - ( 22 Jun 2023 20:40 )  PTT:40.2 sec  Urinalysis Basic - ( 23 Jun 2023 08:33 )    Color: x / Appearance: x / SG: x / pH: x  Gluc: 117 mg/dL / Ketone: x  / Bili: x / Urobili: x   Blood: x / Protein: x / Nitrite: x   Leuk Esterase: x / RBC: x / WBC x   Sq Epi: x / Non Sq Epi: x / Bacteria: x      Magnesium: 1.4 mg/dL (06-23 @ 03:47)  Phosphorus: 2.5 mg/dL (06-23 @ 03:47)    Urine studies    PTH and Vit D:

## 2023-06-23 NOTE — CHART NOTE - NSCHARTNOTEFT_GEN_A_CORE
Mr. Rhoades had a significant worsening of his condition, now intubated, on high dose Levophed and Vasopressin, and with lab/exam findings consistent with multiorgan system failure. Spoke with patient's brother Germán and had an extensive conversation regarding the patient's current status, diagnostics, treatment plan, and overall prognosis. Germán asked about the plans for HD and we explained he was not a candidate for intermittent HD while on high doses of two vasopressors, which he expressed an understanding of. All questions answered. Planned for family meeting 6/23 afternoon to discuss Mr. Rhoades' overall condition and goals of care.

## 2023-06-23 NOTE — PROCEDURE NOTE - PROCEDURE DATE TIME, MLM
21-Jun-2023 12:30
13-Jun-2023 14:00
23-Jun-2023 05:30
23-Jun-2023 02:10
17-Jun-2023 17:33
09-Jun-2023 23:46

## 2023-06-23 NOTE — CHART NOTE - NSCHARTNOTEFT_GEN_A_CORE
Assessment:   Patient is a 63y old  Male who presents with a chief complaint of sepsis (2023 15:56). Pt transferred to ICU , intubated. MOSF, pressor support. Trickle TF Nepro was ordered. Pt discussed on ICU IDR, palliative referral was pending for family meeting in PM. Pt now DNR/ DNI, Comfort measures only. TF order has been canceled.        Diet Prescription: (none)  Intake:     Daily     Daily Weight in k.1 (2023 08:00)  Weight in k.8 (2023 08:00)  Weight in k.3 (2023 07:30)  Weight in k.6 (10 Carlin 2023 07:00)        Pertinent Medications: MEDICATIONS  (STANDING):  dorzolamide 2% Ophthalmic Solution 1 Drop(s) Right EYE <User Schedule>  fentaNYL   Infusion 1 MICROgram(s)/kG/Hr (8.64 mL/Hr) IV Continuous <Continuous>  glycopyrrolate Injectable 0.2 milliGRAM(s) IV Push every 8 hours  polyethylene glycol 3350 17 Gram(s) Oral every 12 hours  propofol Infusion 15 MICROgram(s)/kG/Min (7.78 mL/Hr) IV Continuous <Continuous>  senna Syrup 10 milliLiter(s) Oral at bedtime  vitamin A &amp; D Ointment 1 Application(s) Topical at bedtime    MEDICATIONS  (PRN):  HYDROmorphone  Injectable 1 milliGRAM(s) IV Push every 3 hours PRN Severe Pain (7 - 10)  LORazepam   Injectable 1 milliGRAM(s) IV Push every 4 hours PRN Agitation    Pertinent Labs:  Na151 mmol/L<H> Glu 117 mg/dL<H> K+ 3.2 mmol/L<L> Cr  5.16 mg/dL<H> BUN 69 mg/dL<H>  Phos 2.5 mg/dL  Alb 2.1 g/dL<L>     CAPILLARY BLOOD GLUCOSE      POCT Blood Glucose.: 110 mg/dL (2023 11:33)        Estimated Needs: N/A  [ ] no change since previous assessment  [ ] recalculated:       Previous Nutrition Diagnosis:   [ ] Altered GI function  [ ]Inadequate Oral Intake [ ] Swallowing Difficulty   [ ] Altered nutrition related labs [ ] Increased Nutrient Needs [ ] Overweight/Obesity   [ ] Unintended Weight Loss [ ] Food & Nutrition Related Knowledge Deficit [x ] Malnutrition (moderate PCM)  [ ] Other:     Nutrition Diagnosis is [x ] at least moderate PCM     Interventions:   Recommend  [ ] Change Diet To:  [ ] Nutrition Supplement  [ ] Nutrition Support  [x ] Other: Comfort measures only. Please enter diet order as NPO (as appropriate).  MD to monitor. RD available.     Monitoring and Evaluation:   [ x ] follow up per protocol  [ ] other:

## 2023-06-23 NOTE — PROGRESS NOTE ADULT - SUBJECTIVE AND OBJECTIVE BOX
INTERVAL HPI/OVERNIGHT EVENTS: Intubated for AHRF. Found to have worsening shock, likely septic, on two vasopressors with escalating dose requirements. Findings of multiorgan system failure.       PRESSORS: [X] YES [ ] NO  WHICH: Levophed and Vasopressin     Antimicrobial:  meropenem  IVPB 500 milliGRAM(s) IV Intermittent every 12 hours    Cardiovascular:  norepinephrine Infusion 0.55 MICROgram(s)/kG/Min IV Continuous <Continuous>    Pulmonary:    Hematalogic:    Other:  chlorhexidine 2% Cloths 1 Application(s) Topical <User Schedule>  dorzolamide 2% Ophthalmic Solution 1 Drop(s) Right EYE <User Schedule>  epoetin madyson (PROCRIT) Injectable 07690 Unit(s) SubCutaneous <User Schedule>  fentaNYL    Injectable 50 MICROGram(s) IV Push every 3 hours PRN  fentaNYL   Infusion 1 MICROgram(s)/kG/Hr IV Continuous <Continuous>  glycopyrrolate Injectable 0.2 milliGRAM(s) IV Push every 8 hours  nystatin Powder 1 Application(s) Topical two times a day  pantoprazole  Injectable 40 milliGRAM(s) IV Push every 24 hours  propofol Infusion 15 MICROgram(s)/kG/Min IV Continuous <Continuous>  sodium chloride 0.9% lock flush 10 milliLiter(s) IV Push every 1 hour PRN  sucralfate suspension 1 Gram(s) Oral four times a day  vasopressin Infusion 0.04 Unit(s)/Min IV Continuous <Continuous>  vitamin A &amp; D Ointment 1 Application(s) Topical at bedtime    chlorhexidine 2% Cloths 1 Application(s) Topical <User Schedule>  dorzolamide 2% Ophthalmic Solution 1 Drop(s) Right EYE <User Schedule>  epoetin madyson (PROCRIT) Injectable 31381 Unit(s) SubCutaneous <User Schedule>  fentaNYL    Injectable 50 MICROGram(s) IV Push every 3 hours PRN  fentaNYL   Infusion 1 MICROgram(s)/kG/Hr IV Continuous <Continuous>  glycopyrrolate Injectable 0.2 milliGRAM(s) IV Push every 8 hours  meropenem  IVPB 500 milliGRAM(s) IV Intermittent every 12 hours  norepinephrine Infusion 0.55 MICROgram(s)/kG/Min IV Continuous <Continuous>  nystatin Powder 1 Application(s) Topical two times a day  pantoprazole  Injectable 40 milliGRAM(s) IV Push every 24 hours  propofol Infusion 15 MICROgram(s)/kG/Min IV Continuous <Continuous>  sodium chloride 0.9% lock flush 10 milliLiter(s) IV Push every 1 hour PRN  sucralfate suspension 1 Gram(s) Oral four times a day  vasopressin Infusion 0.04 Unit(s)/Min IV Continuous <Continuous>  vitamin A &amp; D Ointment 1 Application(s) Topical at bedtime    Drug Dosing Weight  Height (cm): 172.7 (09 Jun 2023 21:00)  Weight (kg): 86.4 (22 Jun 2023 13:15)  BMI (kg/m2): 29 (22 Jun 2023 13:15)  BSA (m2): 2 (22 Jun 2023 13:15)    PHYSICAL EXAM:  GENERAL: Critically ill appearing.   HEAD:  Atraumatic, Normocephalic  EYES: EOMI, PERRLA, conjunctiva and sclera clear  ENMT: No tonsillar erythema, exudates. Nasal feeding tube.  NECK: Supple, No JVD  NERVOUS SYSTEM:  Intubated and sedated to RASS -2.   CHEST/LUNG: Diminished breath sounds bilaterally. R>L Scattered rhonchi. +Bibasilar crackles.  HEART: NSR. Hypotensive on vasopressors.   ABDOMEN: Soft, Nontender, Nondistended; Bowel sounds present  EXTREMITIES:  +2 edema bilaterally. +anasarca. 2+ Peripheral Pulses, No clubbing or cyanosis  SKIN: +Pallor. Multiple ecchymotic areas. Skin tears bilateral thighs anteriorly and posteriorly.  Sacrum DTI. DTI right heel. Extensive skin breakdown.     LINES/DRAINS/DEVICES  CENTRAL LINE: [X] YES [ ] NO  LOCATION: [facility] C[facility]  HATFIELD: [X] YES [ ] NO     A-LINE:  [X] YES [ ] NO  LOCATION: Right Axillary       ICU Vital Signs Last 24 Hrs  T(C): 36.7 (23 Jun 2023 12:00), Max: 38 (22 Jun 2023 20:00)  T(F): 98.1 (23 Jun 2023 12:00), Max: 100.4 (22 Jun 2023 20:00)  HR: 86 (23 Jun 2023 12:00) (76 - 129)  BP: 80/45 (23 Jun 2023 05:00) (77/39 - 164/142)  BP(mean): 55 (23 Jun 2023 05:00) (51 - 149)  ABP: 107/47 (23 Jun 2023 12:00) (87/48 - 130/57)  ABP(mean): 68 (23 Jun 2023 12:00) (62 - 90)  RR: 18 (23 Jun 2023 12:00) (16 - 33)  SpO2: 100% (23 Jun 2023 12:00) (89% - 100%)    O2 Parameters below as of 22 Jun 2023 21:30  Patient On (Oxygen Delivery Method): conventional ventilator    O2 Concentration (%): 60        ABG - ( 23 Jun 2023 03:15 )  pH, Arterial: 7.22  pH, Blood: x     /  pCO2: 39    /  pO2: 76    / HCO3: 16    / Base Excess: -11.1 /  SaO2: 98                    06-22 @ 07:01  -  06-23 @ 07:00  --------------------------------------------------------  IN: 1718.9 mL / OUT: 595 mL / NET: 1123.9 mL        Mode: AC/ CMV (Assist Control/ Continuous Mandatory Ventilation)  RR (machine): 18  TV (machine): 450  FiO2: 50  PEEP: 5  ITime: 0.8  MAP: 11  PIP: 25        LABS:  CBC Full  -  ( 23 Jun 2023 03:47 )  WBC Count : 3.08 K/uL  RBC Count : 3.08 M/uL  Hemoglobin : 9.0 g/dL  Hematocrit : 28.8 %  Platelet Count - Automated : 71 K/uL  Mean Cell Volume : 93.5 fl  Mean Cell Hemoglobin : 29.2 pg  Mean Cell Hemoglobin Concentration : 31.3 gm/dL  Auto Neutrophil # : 1.88 K/uL  Auto Lymphocyte # : 0.89 K/uL  Auto Monocyte # : 0.22 K/uL  Auto Eosinophil # : 0.00 K/uL  Auto Basophil # : 0.00 K/uL  Auto Neutrophil % : 53.0 %  Auto Lymphocyte % : 29.0 %  Auto Monocyte % : 7.0 %  Auto Eosinophil % : 0.0 %  Auto Basophil % : 0.0 %    06-23    151<H>  |  125<H>  |  69<H>  ----------------------------<  117<H>  3.2<L>   |  16<L>  |  5.16<H>    Ca    9.7      23 Jun 2023 08:33  Phos  2.5     06-23  Mg     1.4     06-23    TPro  5.2<L>  /  Alb  2.1<L>  /  TBili  0.6  /  DBili  x   /  AST  8<L>  /  ALT  9<L>  /  AlkPhos  59  06-23    PT/INR - ( 22 Jun 2023 20:40 )   PT: 16.0 sec;   INR: 1.34 ratio         PTT - ( 22 Jun 2023 20:40 )  PTT:40.2 sec  Urinalysis Basic - ( 23 Jun 2023 08:33 )    Color: x / Appearance: x / SG: x / pH: x  Gluc: 117 mg/dL / Ketone: x  / Bili: x / Urobili: x   Blood: x / Protein: x / Nitrite: x   Leuk Esterase: x / RBC: x / WBC x   Sq Epi: x / Non Sq Epi: x / Bacteria: x

## 2023-06-23 NOTE — PROGRESS NOTE ADULT - REASON FOR ADMISSION
sepsis

## 2023-06-23 NOTE — CHART NOTE - NSCHARTNOTESELECT_GEN_ALL_CORE
Anesthesia/Event Note
Event Note
Family update/Event Note
Gastroenterology/Event Note
Note/Event Note
Nutrition Services
Palliative Care/Event Note
RRT for hypotension/Event Note
Removal of Central Line/Event Note
TransferAI/Event Note
Update/Event Note
family update/Event Note
family update/Event Note
Event Note
Family Update Note/Event Note
Family Update Note/Event Note
Family Update/Event Note
Family update/Event Note
FamilyTalk/Event Note
IR notification/Event Note
Intubation/Event Note
Nutrition Services
Transfer Note
family update/Event Note
Event Note

## 2023-06-23 NOTE — PROGRESS NOTE ADULT - NUTRITIONAL ASSESSMENT
This patient has been assessed with a concern for Malnutrition and has been determined to have a diagnosis/diagnoses of Moderate protein-calorie malnutrition.    This patient is being managed with:   Diet NPO with Tube Feed-  Tube Feeding Modality: Nasogastric  Nepro with Carb Steady  Total Volume for 24 Hours (mL): 240  Continuous  Starting Tube Feed Rate {mL per Hour}: 10  Increase Tube Feed Rate by (mL): 0  Until Goal Tube Feed Rate (mL per Hour): 10  Tube Feed Duration (in Hours): 24  Tube Feed Start Time: 09:00  Free Water Flush  Bolus   Total Volume per Flush (mL): 300   Frequency: Every 4 Hours  Entered: Jun 23 2023  8:04AM

## 2023-06-23 NOTE — PROCEDURE NOTE - NSPOSTCAREGUIDE_GEN_A_CORE
Verbal/written post procedure instructions were given to patient/caregiver/Care for catheter as per unit/ICU protocols
Verbal/written post procedure instructions were given to patient/caregiver
Care for catheter as per unit/ICU protocols
Verbal/written post procedure instructions were given to patient/caregiver

## 2023-06-23 NOTE — PROCEDURE NOTE - NSPROCSEDATIONNOT_GEN_ALL_CORE
----- Message from Du Watkins sent at 7/6/2018  9:20 AM CDT -----  Contact: PT   PT needs a refill on tamsulosin (FLOMAX) 0.4 mg Cp24 called into pharmacy at [facility]  [phone]     Pt is completely out of meds     Pt can be reached at [phone]  
Spoke with patient, informed med has been called in for refill and to contact before picking up to make sure they received it, patient verbally understood.  
No
Yes
No
Yes
No

## 2023-06-23 NOTE — PROGRESS NOTE ADULT - ASSESSMENT
ASSESSMENT & PLAN:  63-year-old man from [place] with HFpEF, COPD, bedbound, chronic sacral/gluteal ulcers, HTN, HLD, PVD, seizures, CKD IV, anemia, BPH, lymphedema, and bipolar disorder presented to  with urosepsis and GI bleeding, prolonged hospital course complicated by multiple ICU transfers, sepsis, AHRF, esophagitis, and thrombocytopenia. Transferred to ICU (6/22) for AHRF, pneumonia, and septic shock requiring intubation and vasopressor support.     Active Issues  - Acute on Chronic Hypoxic Respiratory Failure     NEUROLOGY:  -   -    PULMONARY:  -  -    CARDIOLOGY:  -  -    GASTROINTESTINAL:  -  -    RENAL/:  -  -    INFECTIOUS DISEASE:  -   -    HEMATOLOGY:  -   - DVT prophylaxis with bilateral mechanical compression devices and SubQ Heparin.     ENDOCRINE  -    PROPHYLAXIS  - SubQ Lovenox at prophylaxis dosing and bilateral mechanical compression devices.  - Pantroprazole 40mg daily.     ETHICS/DISPOSITION:  - Full code.   - Admitted to ICU.     LINES/DRAINS/TUBES/DEVICES:  -  -    Above assesment and plan of care performed, created, and reviewed in real time with ICU attending physician Dr. iVcki Morin ASSESSMENT & PLAN:  63-year-old man from [place] with HFpEF, COPD, bedbound, chronic sacral/gluteal ulcers, HTN, HLD, PVD, seizures, CKD IV, anemia, BPH, lymphedema, and bipolar disorder presented to  with urosepsis and GI bleeding, prolonged hospital course complicated by multiple ICU transfers, sepsis, AHRF, esophagitis, and thrombocytopenia. Transferred to ICU (6/22) for AHRF, pneumonia, and septic shock requiring intubation and vasopressor support.     Active Issues  - Acute on Chronic Hypoxic Respiratory Failure   - Lobar Pneumonia  - Septic Shock  - COPD  - sacral/gluteal ulcers  - Acute Renal Failure on Chronic Kidney Disease   - Anemia  - BPH with Chronic Viera  - Lymphedema  - Bipolar Disorder   - GI Bleeding (non-active)    NEUROLOGY:  #Sedation & Analgesia   - Propofol RASS -1 - -2 while intubated. Fentanyl infusion for pain control.     #Acute Toxic/Metabolic Encephelopathy  - Worsening confusion/lethargy prior to intubation/sedation (6/22).   - Likely related to sepsis, hypoxia, and uremia.     PULMONARY:  #Acute on Chronic Hypoxic Respiratory Failure   #Lobar Pneumonia  #COPD  - Intubated on 6/22 for hypoxia and airway protection.   - Chest imaging with interval development of right middle lobe pneumonia.   - Meropenem and Vancomycin (6/23 - ?) to cover resistant organisms.     CARDIOLOGY:  #Septic Shock  - On high dose Levophed and Vasopressin.   - Received volume resuscitation, no additional fluid boluses required at this time.   - Signs of multiorgan system failure.     GASTROINTESTINAL:  Nutrition  - Trickle feeds with Nephro while on high dose vasopressors.   - Bowel regimen with Senna and Miralax while on Fentanyl.     #GI Bleeding  - No signs of active bleeding.   - Continue Pantoprazole and Carafate.     RENAL/:  #Acute Renal Failure on Chronic Kidney Disease  - Baseline CKD III-IV.   - With ARF on CKD, planned for Permcath placement and initiation of HD prior to ICU transfer.   - Not a candidate for intermittent HD due to high dose vasopressors. Discussed with brother Germán who agrees.   - Nephrology Dr. Valenzuela.     INFECTIOUS DISEASE:  #Septic Shock  #Lobar Pneumonia  - Septic shock with multiorgan system failure possibly secondary to right middle lobe pneumonia.  - Course of Meropenem/Vancomycin (6/23 - ?) due to hospital stay.   - Pan-cultures sent 6/22, results pending. BCx from 6/10 NGTD. Last positive BCx was 5/28 with pseudomonas   - Previous on multiple different antibiotics for empiric coverage, pneumonia, and complicated UTIs.     HEMATOLOGY:  #Anemia  #Thrombocytopenia  - Chronic anemia and GI bleeding, on Epo.  - DVT prophylaxis with bilateral mechanical compression devices a    ENDOCRINE  #Glycemic control  - Blood glucose and ISS q6.     ETHICS/DISPOSITION:  - Full code.   - High likelihood of death within hours to days. Multiple extensive goals of care conversations with HCP and Brother Germán.   - ICU.    ASSESSMENT & PLAN:  63-year-old man from [place] with HFpEF, COPD, bedbound, chronic sacral/gluteal ulcers, HTN, HLD, PVD, seizures, CKD IV, anemia, BPH, lymphedema, and bipolar disorder presented to  with urosepsis and GI bleeding, prolonged hospital course complicated by multiple ICU transfers, sepsis, AHRF, esophagitis, and thrombocytopenia. Transferred to ICU (6/22) for AHRF, pneumonia, and septic shock requiring intubation and vasopressor support.     Active Issues  - Acute on Chronic Hypoxic Respiratory Failure   - Lobar Pneumonia  - Septic Shock  - COPD  - sacral/gluteal ulcers  - Acute Renal Failure on Chronic Kidney Disease   - Anemia  - BPH with Chronic Viera  - Lymphedema  - Bipolar Disorder   - GI Bleeding (non-active)    NEUROLOGY:  #Sedation & Analgesia   - Propofol RASS -1 - -2 while intubated. Fentanyl infusion for pain control.     #Acute Toxic/Metabolic Encephelopathy  - Worsening confusion/lethargy prior to intubation/sedation (6/22).   - Likely related to sepsis, hypoxia, and uremia.     PULMONARY:  #Acute on Chronic Hypoxic Respiratory Failure   #Lobar Pneumonia  #COPD  - Intubated on 6/22 for hypoxia and airway protection.   - Chest imaging with interval development of right middle lobe pneumonia.   - Meropenem and Vancomycin (6/23 - ?) to cover resistant organisms.     CARDIOLOGY:  #Septic Shock  - On high dose Levophed and Vasopressin.   - Received volume resuscitation, no additional fluid boluses required at this time.   - Signs of multiorgan system failure.     GASTROINTESTINAL:  Nutrition  - Trickle feeds with Nephro while on high dose vasopressors.   - Bowel regimen with Senna and Miralax while on Fentanyl.     #GI Bleeding  - No signs of active bleeding.   - Continue Pantoprazole and Carafate.     RENAL/:  #Acute Renal Failure on Chronic Kidney Disease  - Baseline CKD III-IV.   - With ARF on CKD, planned for Permcath placement and initiation of HD prior to ICU transfer.   - Not a candidate for intermittent HD due to high dose vasopressors. Discussed with brother Germán who agrees.   - Nephrology Dr. Valenzuela.     INFECTIOUS DISEASE:  #Septic Shock  #Lobar Pneumonia  - Septic shock with multiorgan system failure possibly secondary to right middle lobe pneumonia.  - Course of Meropenem/Vancomycin (6/23 - ?) due to hospital stay.   - Pan-cultures sent 6/22, results pending. BCx from 6/10 NGTD. Last positive BCx was 5/28 with pseudomonas   - Previous on multiple different antibiotics for empiric coverage, pneumonia, and complicated UTIs.     HEMATOLOGY:  #Anemia  #Thrombocytopenia  - Chronic anemia and GI bleeding, on Epo.  - DVT prophylaxis with bilateral mechanical compression devices.    ENDOCRINE  #Glycemic control  - Blood glucose and ISS q6.     SKIN/SOFT TISSUE  Decubiti   - Multiple areas of wounds and skin breakdown.  - Wound care following.      ETHICS/DISPOSITION:  - Full code.   - High likelihood of death within hours to days. Multiple extensive goals of care conversations with HCP and Brother Germán.   - ICU.

## 2023-06-23 NOTE — PROCEDURE NOTE - NSINDICATIONS_GEN_A_CORE
critical patient/monitoring purposes
antibiotic therapy
critical illness/emergency venous access/volume resuscitation

## 2023-06-23 NOTE — PROCEDURE NOTE - NSSITEPREP_SKIN_A_CORE
chlorhexidine
chlorhexidine/Adherence to aseptic technique: hand hygiene prior to donning barriers (gown, gloves), don cap and mask, sterile drape over patient
chlorhexidine
chlorhexidine
N/A

## 2023-06-23 NOTE — PROGRESS NOTE ADULT - SUBJECTIVE AND OBJECTIVE BOX
Patient is a 63y old  Male who presents with a chief complaint of sepsis (2023 15:56)    PATIENT IS SEEN AND EXAMINED IN ICU.    ALLERGIES:  No Known Allergies      Daily     Daily Weight in k.1 (2023 08:00)    VITALS:    Vital Signs Last 24 Hrs  T(C): 37.3 (2023 14:00), Max: 38 (2023 20:00)  T(F): 99.1 (2023 14:00), Max: 100.4 (2023 20:00)  HR: 81 (2023 16:01) (76 - 129)  BP: 80/45 (2023 05:00) (77/39 - 164/142)  BP(mean): 55 (2023 05:00) (51 - 149)  RR: 20 (2023 14:30) (16 - 33)  SpO2: 99% (2023 16:01) (89% - 100%)    Parameters below as of 2023 21:30  Patient On (Oxygen Delivery Method): conventional ventilator    O2 Concentration (%): 60    LABS:    CBC Full  -  ( 2023 03:47 )  WBC Count : 3.08 K/uL  RBC Count : 3.08 M/uL  Hemoglobin : 9.0 g/dL  Hematocrit : 28.8 %  Platelet Count - Automated : 71 K/uL  Mean Cell Volume : 93.5 fl  Mean Cell Hemoglobin : 29.2 pg  Mean Cell Hemoglobin Concentration : 31.3 gm/dL  Auto Neutrophil # : 1.88 K/uL  Auto Lymphocyte # : 0.89 K/uL  Auto Monocyte # : 0.22 K/uL  Auto Eosinophil # : 0.00 K/uL  Auto Basophil # : 0.00 K/uL  Auto Neutrophil % : 53.0 %  Auto Lymphocyte % : 29.0 %  Auto Monocyte % : 7.0 %  Auto Eosinophil % : 0.0 %  Auto Basophil % : 0.0 %    PT/INR - ( 2023 20:40 )   PT: 16.0 sec;   INR: 1.34 ratio         PTT - ( 2023 20:40 )  PTT:40.2 sec  06-    151<H>  |  125<H>  |  69<H>  ----------------------------<  117<H>  3.2<L>   |  16<L>  |  5.16<H>    Ca    9.7      2023 08:33  Phos  2.5       Mg     1.4         TPro  5.2<L>  /  Alb  2.1<L>  /  TBili  0.6  /  DBili  x   /  AST  8<L>  /  ALT  9<L>  /  AlkPhos  59      CAPILLARY BLOOD GLUCOSE      POCT Blood Glucose.: 110 mg/dL (2023 11:33)        LIVER FUNCTIONS - ( 2023 03:47 )  Alb: 2.1 g/dL / Pro: 5.2 g/dL / ALK PHOS: 59 U/L / ALT: 9 U/L DA / AST: 8 U/L / GGT: x           Creatinine Trend: 5.16<--, 5.40<--, 5.55<--, 5.43<--, 5.43<--, 5.41<--  I&O's Summary    2023 07:  -  2023 07:00  --------------------------------------------------------  IN: 1718.9 mL / OUT: 595 mL / NET: 1123.9 mL    2023 07:01  -  2023 17:30  --------------------------------------------------------  IN: 891.4 mL / OUT: 150 mL / NET: 741.4 mL        ABG - ( 2023 03:15 )  pH, Arterial: 7.22  pH, Blood: x     /  pCO2: 39    /  pO2: 76    / HCO3: 16    / Base Excess: -11.1 /  SaO2: 98          .Sputum Sputum  06-10 @ 10:23   Normal Respiratory Ivelisse present  --    Few polymorphonuclear leukocytes per low power field  Few Squamous epithelial cells per low power field  Rare Yeast like cells per oil power field  Few Gram Variable Rods per oil power field      .Blood Blood-Peripheral  06-10 @ 04:42   No Growth Final  --  --      .Blood Blood-Peripheral  06-10 @ 03:40   No Growth Final  --  --      .Blood Blood   @ 10:52   No Growth Final  --  --      .Blood Blood   @ 10:46   No Growth Final  --  --      .Blood Blood-Peripheral   @ 21:09   No Growth Final  --  Blood Culture PCR  Pseudomonas aeruginosa      Clean Catch Clean Catch (Midstream)   @ 21:02   >=3 organisms. Probable collection contamination.  --  --          MEDICATIONS:    MEDICATIONS  (STANDING):  dorzolamide 2% Ophthalmic Solution 1 Drop(s) Right EYE <User Schedule>  fentaNYL   Infusion 1 MICROgram(s)/kG/Hr (8.64 mL/Hr) IV Continuous <Continuous>  glycopyrrolate Injectable 0.2 milliGRAM(s) IV Push every 8 hours  propofol Infusion 15 MICROgram(s)/kG/Min (7.78 mL/Hr) IV Continuous <Continuous>  senna Syrup 10 milliLiter(s) Oral at bedtime  vitamin A &amp; D Ointment 1 Application(s) Topical at bedtime      MEDICATIONS  (PRN):  HYDROmorphone  Injectable 1 milliGRAM(s) IV Push every 3 hours PRN Severe Pain (7 - 10)  LORazepam   Injectable 1 milliGRAM(s) IV Push every 4 hours PRN Agitation      REVIEW OF SYSTEMS:                           ALL ROS DONE [ X   ]    CONSTITUTIONAL:  LETHARGIC [   ], FEVER [   ], UNRESPONSIVE [   ]  CVS:  CP  [   ], SOB, [   ], PALPITATIONS [   ], DIZZYNESS [   ]  RS: COUGH [   ], SPUTUM [   ]  GI: ABDOMINAL PAIN [   ], NAUSEA [   ], VOMITINGS [   ], DIARRHEA [   ], CONSTIPATION [   ]  :  DYSURIA [   ], NOCTURIA [   ], INCREASED FREQUENCY [   ], DRIBLING [   ],  SKELETAL: PAINFUL JOINTS [   ], SWOLLEN JOINTS [   ], NECK ACHE [   ], LOW BACK ACHE [   ],  SKIN : ULCERS [   ], RASH [   ], ITCHING [   ]  CNS: HEAD ACHE [   ], DOUBLE VISION [   ], BLURRED VISION [   ], AMS / CONFUSION [   ], SEIZURES [   ], WEAKNESS [   ],TINGLING / NUMBNESS [   ]    PHYSICAL EXAMINATION:    GENERAL APPEARANCE: NO DISTRESS  HEENT:  NO PALLOR, NO  JVD,  NO   NODES, NECK SUPPLE  CVS: S1 +, S2 +,   RS: AEEB,  OCCASIONAL  RALES +,   NO RONCHI  ABD: SOFT, NT, NO, BS +   NGT+  EXT: PE+  SKIN: WARM,   SKELETAL:  ROM REDUCED AT CERVICAL & LS SPINE  CNS:  AAO X 2      RADIOLOGY :    RADIOLOGY AND READINGS REVIEWED    ASSESSMENT :       PLAN:  HPI:  64 y/o M, A&Ox3, bedbound, chronic daley catheter with CKD (baseline creat 3.8), COPD, CHFpEF, BPH, PVD, Lymphedema, decubitus ulcers (Sacrum/gluteal) , Anemia, poly-neuropathy, poly-arthritis, hypothyroidism, seizures and Bipolar disorder was BIB EMS from [place] for abdominal pain, [place]. Mr. Rhoades states that he developed acute onset sharp abdominal pain 4 days ago a/w "brown" emesis (witnessed by EMS). Unable to tolerate food/drink x 4 days. Having normal bowel movements, last this morning . Denies any fevers. NH paperwork states concern for obstruction.  Patient endorses BLOODY EMESIS x 4 days upon further questioning. (29 May 2023 01:36)    # PROGNOSIS IS POOR. PALLIATIVE CARE AND CRITICAL CARE TEAM ADDRESSING GOC W/ FAMILY.     # SEPSIS S/T SUSPECT ASPIRATION PNEUMONIA  # ACUTE ON CHRONIC HYPOXIC RESPIRATORY FAILURE S/T SUSPECTED ASPIRATION PNA, ATELECTASIS AND COPD  - MEROPENEM, F/U BCX   - INTUBATED ON MECHANICAL VENITALTION  - ON VASOPRESSORS  - CHEST PT  - CRITICAL CARE MANAGEMENT IN PROGRESS  - ID CONSULT    # RESOLVING SEPSIS S/T P.AERUGINOSA BACTEREMIA + COMPLICATED UTI    - S/P ON CEFEPIME [AND VANCOMYCIN], BCX [P. AERUGINOSA] AND UCX [ > 3 ORGANISMS], REPEAT BCX - NGTD   - S/P VASOPRESSORS  - ID CONSULT  - CRITICAL CARE EVALUATION IN PROGRESS    # RESOLVING GI BLEED  # ESOPHAGITIS  # ? UREMIC GASTROPARESIS  # ANEMIA OF CHRONIC DISEASE, ANEMIA OF CKD   # THROMBOCYTOPENIA  - MONITOR HGB, TRANSFUSION THRESHOLD HGB < 8  - TREND PLT  - S/P EGD - ULCERATIVE ESOPHAGITIS ; REPEAT EGD IMPROVING ESOPHAGITIS, SHALLOW DIVERTICULUM IN DISTAL ESOPHAGUS, SINGLE INFLAMMATORY NODULE IN ANTRUM  - PPI BID  - CARAFATE QID  - ADVANCING DIET PER GI RECOMMENDATION   - GI CONSULT  - HEME/ONC CONSULT  - CRITICAL CARE MANAGMENT IN PROGRESS    - RECURRENT NAUSEA/VOMITTING  - S/P MULTIPLE PRBC TRANSFUSION. ? NO OVERT SIGNS OF BLEEDING, MONITORING CLOSELY    # THROMBOCYTOPENIA  - W/UP IN PROGRESS  - S/P PLT TRANSFUSION  - HEME/ONC CONSULT    - S/P PLT TRANSFUSION    # AMS DUE TO ACUTE METABOLIC ENCEPHALOPATHY    # SHEYLA ON CKD STAGE 4  - ATN - WORSENING  # CHRONIC DALEY, BPH, URINARY RETENTION  - DALEY   - STRICT IS AND OS  - AVOID NEPHROTOXIC AGENTS  - MONITOR CR  - NEPHROLOGY CONSULT    - PLANNED FOR PERMACATH PLACEMENT FOR HD    # CANDIDA AURIS EXPOSURE  - ON ISOLATION PRECAUTIONS    # AMBULATORY DYSFUNCTION, IMPAIRED GAIT DUE TO GENERALIZED MUSCLE WEAKNESS, CERVICAL & LS SPINAL STENOSIS, POLYARTRHITIS & PERIPHERAL NEUROPATHY. OP  - FALL PRECAUTIONS    # DYSPHAGIA DUE TO METABOLIC ENCEPHALOPATHY   - NPO GIVEN NAUSEA/EMESIS  - NGT PLACED  - NOTED ST EVAL    # HX OF SEIZURE DX  - ON VALPROIC ACID [STOPPED GIVEN THROMBOCYTOPENIA]  - DEPAKOTE    # HYPOTHYROIDISM - ON LEVOTHYROXINE    # PRESSURE ULCERS  - PATIENT IS HIGH RISK FOR PRESSURE ULCERS DESPITE PREVENTIVE MEASURES IN PLACE  - WOUND CARE CONSULT    # BIPOLAR DISORDER - ON DEPAKOTE, WELLBUTRIN XL    # GI PPX

## 2023-06-23 NOTE — GOALS OF CARE CONVERSATION - ADVANCED CARE PLANNING - CONVERSATION DETAILS
Mr. Rhoades has multiple chronic medical conditions, admitted to [facility] on 5/29 and has an extensive hospital course with multiple ICU transfers, re-presented to ICU on 6/22 for AHRF requiring intubation and septic shock requiring high dose vasopressor support likely secondary to pneumonia. Over the past few days Mr. Rhoades has had a worsening of condition, pending initiation of HD, and over the past 24 hours his condition has sharply declined and he now appears to be in multiple organ system failure.     I spoke with Mr. Germán Rhoades, his brother, who was identified as the health care proxy, his sisters Daija and ***, and we had an extensive conversation about Mr. Rhoades's diagnosis, treatment plan, and overall grave prognosis with high likelihood of further decompensation and death during this hospital course.     After this we discussed overall goals of care including advanced directives with Mr. Rhoades' family. His family explained that they understood the severity of his acute and chronic conditions, but were hopeful that with continued medical care and starting hemodialysis he would have an improvement, and that was why they consented to ICU care (intubation, central line, etc.). They spoke about how seeing him in this critical condition, they want to focus on doing whatever they can to promote comfort.      After this conversation, an explanation of advanced directives and MOLST was provided. The Jf family identifies ensure Bear's comfort as one of the most important goals. Decision was made by HCP Germán with agreement from both of his sister to change status to DNR/Allow Natural Death, discontinue vasopressors, and transition to comfort focused care. MOLST form completed, signed, and placed in chart.     Above was reviewed with MICU attending physician  ____ .     Ajith López NP Mr. Rhoades has multiple chronic medical conditions, admitted to [facility] on 5/29 and has an extensive hospital course with multiple ICU transfers, re-presented to ICU on 6/22 for AHRF requiring intubation and septic shock requiring high dose vasopressor support likely secondary to pneumonia. Over the past few days Mr. Rhoades has had a worsening of condition, pending initiation of HD, and over the past 24 hours his condition has sharply declined and he now appears to be in multiple organ system failure.     I spoke with Mr. Germán Rhoades, his brother, who was identified as the health care proxy, his sisters Daija and ***, and we had an extensive conversation about Mr. Rhoades's diagnosis, treatment plan, and overall grave prognosis with high likelihood of further decompensation and death during this hospital course.     After this we discussed overall goals of care including advanced directives with Mr. Rhoades' family. His family explained that they understood the severity of his acute and chronic conditions, but were hopeful that with continued medical care and starting hemodialysis he would have an improvement, and that was why they consented to ICU care (intubation, central line, etc.). They spoke about how seeing him in this critical condition, they want to focus on doing whatever they can to promote comfort.      After this conversation, an explanation of advanced directives and MOLST was provided. The Jf family identifies ensure Bear's comfort as one of the most important goals. Decision was made by HCP Germán with agreement from both of his sister to change status to DNR/Allow Natural Death, discontinue vasopressors, and transition to comfort focused care. MOLST form completed, signed, and placed in chart.     Above was reviewed with MICU attending physician Dr. Cohen .     Ajith López, GARY

## 2023-06-23 NOTE — PROGRESS NOTE ADULT - ASSESSMENT
1. SHEYLA due to ATN  -Scr unchanged 5.4mg/dL from 5.12mg/dL. off fluids. Kidney function not showing signs of renal recovery with underlying CKD.   -Family decided on comfort care measures and dialysis is not needed since comfort care.  -Adjust meds to eGFR and avoid IV Gadolinium contrast,NSAIDs, and phosphate enema.  -Monitor I/O's daily.   -Monitor SMA daily.  2. CKD stage 4 most likely due to ischemic nephropathy  -baseline scr around 3.7mg/dL in Dec after ATN with renal recovery.  -Keep patient euvolemic and renal diet  -Avoid Nephrotoxic Meds/ Agents such as (NSAIDs, IV contrast, Aminoglycosides such as gentamicin, -Gadolinium contrast, Phosphate containing enemas, etc..)  -Adjust Medications according to eGFR  3. h/o Hypotension:  -pressor prn due to septic shock.   -monitor BP.  4. Mineral Bone Disease:  -phos is okay without binders; low phos and phos supplemented.  -PTH is low and no need vitamin d analog.  5. Anemia of CKD  -continue epo 10,000 tiw.  -F/u CBC daily  -transfuse if HB < 7.0.  6. Hypokalemia:  -stable.  -monitor K.   7. Acidosis  -off sodium bicarbonate 650mg tid.  -monitor CO2.   8. Hypernatremia due to lack of free water intake.  -Na worsening  -on free water 250cc q6hrs.  9. Thrombocytopenia:  -plan as per heme/onco   10. Pulm:  -s/p intubation due to respiratory failure.  -Plan as per ICU  -family has decided on comfort care.       Patient is critically ill. Time Spent: 35mins.  Discussed the assessment and plan with ICU Team/Nurse

## 2023-06-23 NOTE — PROCEDURE NOTE - NSPOSTPRCRAD_GEN_A_CORE
central line located in the/central line located in the superior vena cava
no pneumothorax/post-procedure radiography performed

## 2023-06-23 NOTE — PROGRESS NOTE ADULT - CRITICAL CARE ATTENDING COMMENT
This is a 63 year old man , from [place], [facility], ambulates with walker , chronic FC, h/o obesity, HTN, HLD, PVD, Seizures, CKD4 (base SCr 3.8), anemia, BPH, Lymphedema, hypothyroidism, HFpEF, possible COPD, decubitus ulcer (sacrum/gluteal), polyneuropathy, polyarthritis, bipolar disorder. Patient presented to ED for abdominal pain, nausea and vomiting brown emesis. Transferred to  ICU for Hypotension possibly from septic shock secondary to complicated UTI and esophagitis, f/w PsA bacteremia, completed 14d cefepime. Noted to have low H/H but no overt sign of bleeding s/p 2 unit PRBCs total (last 6/9). Possible component of hypovolemia. AMS due to toxic/metabolic encephalopathy, improving.      Plan:  - O2 supp as needed   - No sedation  - AMS due to encephalopathy; mentation improving  - Depakote for seizure disorder  - Hemodynamic monitoring, titrate levophed to MAP >65  - Albumin iv   - NPO; antiemetics and scheduled prokinetics  - SHEYLA   - S/P blood product transfusion   - Monitor CBC daily, H/H now stable; if downtrending will need CT chest abd and pelvis   - On cefepime x 14d; d/c  - Dose vanco as per serum level for now  - Wound care for sacral wound  - Viera cath  - L IJ TLC is 15 cm cath , wave form and blood gas confirm venous  - Psy f/u  - GI eval appreciated; "repeat EGD 6/12: 95% prio esophagitis healed, inflammatory nodule in antrum, shallow diverticulum seen towards distal esophagus"  - HIT eval pending      Rosemary Davalos MD  Pulmonary & Critical Care  Available on Teams.
This is a 63 year old man , from [place], [facility], ambulates with walker , chronic FC, h/o obesity, HTN, HLD, PVD, Seizures, CKD4 (base SCr 3.8), anemia, BPH, Lymphedema, hypothyroidism, HFpEF, possible COPD, decubitus ulcer (sacrum/gluteal), polyneuropathy, polyarthritis, bipolar disorder. Patient presented to ED for abdominal pain, nausea and vomiting brown emesis. Transferred to  ICU for Hypotension possibly from septic shock secondary to complicated UTI and esophagitis, f/w PsA bacteremia, completed 14d cefepime. Noted to have low H/H but no overt sign of bleeding s/p 2 unit PRBCs total (last 6/9). Possible component of hypovolemia. AMS due to toxic/metabolic encephalopathy, improving.      Plan:  - O2 supp as needed   - No sedation  - AMS due to encephalopathy; mentation improving  - Depakote for seizure disorder  - Hemodynamic monitoring   - Albumin iv   - NPO   - SHEYLA   - S/P blood product transfusion   - Monitor CBC daily, H/H now stable; if downtrending will need CT chest abd and pelvis   - On cefepime x 14d; d/c  - Dose vanco as per serum level for now;   - Wound care for sacral decub ulcer  - Viera cath   - D/C Bicarb gtt   - Heme eval noted .. not DIC  - L IJ TLC is 15 cm cath , wave form and blood gas confirm venous   - Will monitor line position and change location  - Psy f/u  - GI eval  - HIT eval pending      Rosemary Davalos MD  Pulmonary & Critical Care  Available on Teams
63-year-old man from [place] with HFpEF, COPD, bedbound, chronic sacral/gluteal ulcers, HTN, HLD, PVD, seizures, CKD IV, anemia, BPH, lymphedema, and bipolar disorder presented to  with urosepsis and GI bleeding, prolonged hospital course complicated by multiple ICU transfers, sepsis, AHRF, esophagitis, and thrombocytopenia. Transferred to ICU (6/22) for AHRF, pneumonia, and septic shock requiring intubation and vasopressor support.     Active Issues  - Acute on Chronic Hypoxic Respiratory Failure   - Right Lobar Pneumonia  - Septic Shock  - COPD  - sacral/gluteal ulcers  - Acute Renal Failure on Chronic Kidney Disease   - Anemia  - BPH with Chronic Daley  - Lymphedema  - Bipolar Disorder  - Gastrointestinal bleeding    Plan:  - Cont. mechanical ventilation with lung protective strategy   - Sedation for vent synchrony   - Broad spectrum antibiotics  - Obtain cultures, including sputum cultures   - Vasopressor support to maintain MAP > 65  - On multiple vasopressors   - Bronchodilators  - Holding off HD as hemodynamically unstable at this time  - Cont. daley and monitoring urine output   - Free water flushes for hypernatremia   - Monitor for signs of bleeding  - Overall prognosis is poor given multiple organ failure   - DVT and stress ulcer prophylaxis  - Aspiration precuations  - Tube feedings  - Prognosis is guarded, palliative care follow up requested
This morning with waxing waning mentation, concern for possible aspiration  Aggressive Chest PT and pulmonary toilet   Hemodynamic monitoring   Oxygen support to maintain saturation > 92%  Transfuse 1 unit PRBC  Check post transfusion CBC  Monitor for signs of bleeding  Cont. daley for now  Likely will need HD at some point  Will transfer to ICU for closer observation as may need intubation  Family arriving today for GOC conversations

## 2023-06-23 NOTE — PROCEDURE NOTE - NSPROCDETAILS_GEN_ALL_CORE
guidewire recovered/lumen(s) aspirated and flushed/sterile dressing applied/sterile technique, catheter placed/ultrasound guidance with use of sterile gel and probe cove
guidewire recovered/lumen(s) aspirated and flushed/sterile dressing applied/sterile technique, catheter placed/ultrasound guidance with use of sterile gel and probe cove
location identified, draped/prepped, sterile technique used, needle inserted/introduced/sutured in place/hemostasis with direct pressure, dressing applied

## 2023-06-23 NOTE — PROCEDURE NOTE - NSPROCNAME_GEN_A_CORE
Central Line Insertion
Arterial Puncture/Cannulation
Central Line Insertion
Peripheral Line Insertion
General
General

## 2023-06-24 NOTE — DISCHARGE NOTE FOR THE EXPIRED PATIENT - HOSPITAL COURSE
63M, from [place], AOx3, bedbound, chronic FC, h/o obesity, HTN, HLD, PVD, Seizures, CKD (base SCr 3.8), anemia, BPH, Lymphedema, hypothyroidism, HFpEF, COPD, decubitus ulcer (sacrum/gluteal), polyneuropathy, polyarthritis, bipolar disorder, p/w abd pain a/w "brown” emesis and food intolerance x4d. Admitted for complicated UTI + suspected GIB.    Patient initially had BP of 88/62 but it improved with IVF. Patient admitted to poor oral intake for the past 4 days due to nausea and vomiting.  CT head was negative for acute changes. CT abd showed circumferential wall thickening of distal esophagus. PPI was started.  Patient complains of persistent nausea without vomiting. Reglan was started TID to improve GI motility. Clear liquid diet was started. Hemoglobin was stable around his baseline ~9. Pt had thrombocytopenia with concern for cefepime induced thrombobytopenia.  We are following daily d-dimers and dribrinogen levels with plan to transfuse 10units cryo if PLTs < 100 or if PLTs < 150 with bleeding. PLTs have been stable in 70-50's and Heparin subQ was started for DVT prophylaxis.    Patient had chronic daley from Dec 2022 and he failed TOV. UA was positive. Patient was started on Cefepime. Blood cultures and urine cultures were sent.  Wound care was consulted for sacral pressure ulcer.    Patient presented with BUN/Cr 103/4.98 (Baseline Cr 3.6~3.8). Patient received IVF and SCr has been slowly improving. Dr. Valenzuela nephrologist is on board.    Patient presented with Hypokelamia with K 3.1->2.8, likely due to vomiting and decreased PO intake. It was repleved IV and oral.    Mr. Rhoades has multiple chronic medical conditions, admitted to [facility] on  and has an extensive hospital course with multiple ICU transfers, re-presented to ICU on  for AHRF requiring intubation and septic shock requiring high dose vasopressor support likely secondary to pneumonia. Over the past few days Mr. Rhoades has had a worsening of condition, pending initiation of HD, and over the past 24 hours his condition has sharply declined and he now appears to be in multiple organ system failure.     After this we discussed overall goals of care including advanced directives with Mr. Rhoades' family. His family explained that they understood the severity of his acute and chronic conditions, but were hopeful that with continued medical care and starting hemodialysis he would have an improvement, and that was why they consented to ICU care (intubation, central line, etc.). They spoke about how seeing him in this critical condition, they want to focus on doing whatever they can to promote comfort.      After this conversation, an explanation of advanced directives and MOLST was provided. The Jf family identifies ensure Bear's comfort as one of the most important goals. Decision was made by HCP Germán with agreement from both of his sister to change status to DNR/Allow Natural Death, discontinue vasopressors, and transition to comfort focused care. MOLST form completed, signed, and placed in chart.    At 6:03AM of 23, the patient had no spontaneous respirations, heart sounds, response to any stimulus including noxious stimuli. Patient's pupil were fixed and dilated and with no response to light, no corneal reflexes, no gag reflex, and no oculocephalic reflex. Patient was pronounced at 6:03AM. Patient's family was contacted.  services were offered, and  arrangements were discussed. ME was not determined to be required, and was not consulted. Attending Dr. Vargas notified. 63M, from [place], AOx3, bedbound, chronic FC, h/o obesity, HTN, HLD, PVD, Seizures, CKD (base SCr 3.8), anemia, BPH, Lymphedema, hypothyroidism, HFpEF, COPD, decubitus ulcer (sacrum/gluteal), polyneuropathy, polyarthritis, bipolar disorder, p/w abd pain a/w "brown” emesis and food intolerance x4d. Admitted for complicated UTI + suspected GIB.    Patient initially had BP of 88/62 but it improved with IVF. Patient admitted to poor oral intake for the past 4 days due to nausea and vomiting.  CT head was negative for acute changes. CT abd showed circumferential wall thickening of distal esophagus. PPI was started.  Patient complains of persistent nausea without vomiting. Reglan was started TID to improve GI motility. Clear liquid diet was started. Hemoglobin was stable around his baseline ~9. Pt had thrombocytopenia with concern for cefepime induced thrombobytopenia.  We are following daily d-dimers and dribrinogen levels with plan to transfuse 10units cryo if PLTs < 100 or if PLTs < 150 with bleeding. PLTs have been stable in 70-50's and Heparin subQ was started for DVT prophylaxis.    Patient had chronic daley from Dec 2022 and he failed TOV. UA was positive. Patient was started on Cefepime. Blood cultures and urine cultures were sent.  Wound care was consulted for sacral pressure ulcer.    Patient presented with BUN/Cr 103/4.98 (Baseline Cr 3.6~3.8). Patient received IVF and SCr has been slowly improving. Dr. Valenzuela nephrologist is on board.    Patient presented with Hypokelamia with K 3.1->2.8, likely due to vomiting and decreased PO intake. It was repleved IV and oral.    Mr. Rhoades has multiple chronic medical conditions, admitted to [facility] on  and has an extensive hospital course with multiple ICU transfers, re-presented to ICU on  for AHRF requiring intubation and septic shock requiring high dose vasopressor support likely secondary to pneumonia. Over the past few days Mr. Rhoades has had a worsening of condition, pending initiation of HD, and over the past 24 hours his condition has sharply declined and he now appears to be in multiple organ system failure.     After this we discussed overall goals of care including advanced directives with Mr. Rhoades' family. His family explained that they understood the severity of his acute and chronic conditions, but were hopeful that with continued medical care and starting hemodialysis he would have an improvement, and that was why they consented to ICU care (intubation, central line, etc.). They spoke about how seeing him in this critical condition, they want to focus on doing whatever they can to promote comfort.      After this conversation, an explanation of advanced directives and MOLST was provided. The Jf family identifies ensure Bear's comfort as one of the most important goals. Decision was made by HCP Germán with agreement from both of his sister to change status to DNR/Allow Natural Death, discontinue vasopressors, and transition to comfort focused care. MOLST form completed, signed, and placed in chart.    At 6:03AM of 23, the patient had no spontaneous respirations, heart sounds, response to any stimulus including noxious stimuli. Patient's pupil were fixed and dilated and with no response to light, no corneal reflexes, no gag reflex, and no oculocephalic reflex. Patient was pronounced at 6:03AM. Patient's family was contacted.  services were offered, and  arrangements were discussed. ME was not determined to be required, and was not consulted. Attending Dr. Vargas notified. Patient's death was secondary to septic shock and nontraumatic.

## 2023-06-24 NOTE — DISCHARGE NOTE FOR THE EXPIRED PATIENT - SECONDARY DIAGNOSIS.
Esophagitis Hypothyroid GIB (gastrointestinal bleeding) BPH (benign prostatic hyperplasia) Bipolar disorder Seizure Acute kidney injury superimposed on CKD CKD (chronic kidney disease) Anemia wrist

## 2023-06-25 LAB
-  AMIKACIN: SIGNIFICANT CHANGE UP
-  AZTREONAM: SIGNIFICANT CHANGE UP
-  CEFEPIME: SIGNIFICANT CHANGE UP
-  CEFTAZIDIME: SIGNIFICANT CHANGE UP
-  CIPROFLOXACIN: SIGNIFICANT CHANGE UP
-  GENTAMICIN: SIGNIFICANT CHANGE UP
-  IMIPENEM: SIGNIFICANT CHANGE UP
-  LEVOFLOXACIN: SIGNIFICANT CHANGE UP
-  MEROPENEM: SIGNIFICANT CHANGE UP
-  PIPERACILLIN/TAZOBACTAM: SIGNIFICANT CHANGE UP
-  TOBRAMYCIN: SIGNIFICANT CHANGE UP
CULTURE RESULTS: SIGNIFICANT CHANGE UP
CULTURE RESULTS: SIGNIFICANT CHANGE UP
METHOD TYPE: SIGNIFICANT CHANGE UP
ORGANISM # SPEC MICROSCOPIC CNT: SIGNIFICANT CHANGE UP
SPECIMEN SOURCE: SIGNIFICANT CHANGE UP
SPECIMEN SOURCE: SIGNIFICANT CHANGE UP

## 2023-07-28 NOTE — PHYSICAL THERAPY INITIAL EVALUATION ADULT - LEVEL OF INDEPENDENCE: BED TO CHAIR, REHAB EVAL
TBA/unable to perform
TBA as patient progresses with strength and tolerance/unable to perform
Cantharidin Counseling:  I discussed with the patient the risks of Cantharidin including but not limited to pain, redness, burning, itching, and blistering.

## 2023-08-01 NOTE — PROGRESS NOTE ADULT - ASSESSMENT
Called mother; seen at ER on July 13; 100.8 fever     Mother states that the ER took child and mother off for more than 2 days and now she has to have paperwork filled out for her job. Per Dr. Gaxiola to bring it and he would try his best to fill it out for her. Mother also states that it is due tomorrow. She states that the ER did not even tell her that they couldn't fill it out until she went up there to  today since it was due tomorrow.    61 year old man with morbid obesity from [facility] [place] ambulates w/ walker has past medical hx of COPD not on O2 at home, hypothyroidism, anemia, left DVT, CKD, BPH, HLD, bipolar disorder, initially admitted for increased lethargy is admitted for hypercapnic respiratroy failure requiring BIPAP.  On 8/31, pt was noted to be increasingly lethargic and drowsy and was hypotensive as well. Rapid response team was called. Patient was only opening eyes to painful stimuli. Pinpoint pupils were noted. Chart review showed he received Dilaudid 4mg PO in the afternoon, Narcan 0.4mg was given and pt opened eyes.   ABG shows PCO2 of 100 for which pt was placed on BiPAP in ICU and improved. IV Lasix 40 mg added for euvolemic status. Pt was able to drink water and take pills 9/1, hence diet advanced to soft. Latest ABG 7.22/62/83/25 on 4 liters NC, hence patient is being downgraded to AI service.     9/2 - Failed TOV, daley reinserted  9/4 - PT rec TG, pending choices, will likely go to [facility] per pt  9/7, R buttock wound noted, dressing applied by RN, wound consult placed. Daley D/c, voiding without any difficulty

## 2023-08-05 NOTE — PROGRESS NOTE ADULT - ASSESSMENT
62 year old morbid obese male, from [facility], has past medical hx of ambulatory dysfunction, ACD, bipolar disorder, BPH, CKD, HLD, PVD, COPD not on oxygen, hypothyroidism, RBBB, lymphedema, CHF (last echo 2/22 normal EF, GIDD) was BIBEMS for hypotension 73/37 mmHg and bradycardia (40's). Patient admitted to ICU for shock and acute encephalopathy.     DX:  - Shock septic vs hypovolemia  - LE cellulitis   - Acute encephalopathy   - Sinus bradycardia  - HAGMA   - SHEYLA on CKD  - Anemia   - COPD not on oxygen at home   - HLD   - BPH  - Hypothyroidism   - Mood disorder       =================== Neuro============================  #Acute Encephalopathy; likely in the setting of acidemia, infection, uremia    Lethargic, fighting vent    Pain meds with Tylenol   CT Head negative for acute bleed/swelling  Off sedation  Awakens to voice, but does not follow commands    #Seizure  - Home med includes valproic acid  - Patient is having repeated muscle spasms, improved on Versed but not fully stopped  - Still having muscle movements after dialysis which brought down BUN  - Started propofol instead of precedex  - Valproic acid level low, likely due to missed doses when patient was originally admitted.  - DC keppra per neuro recs, prefer valproate as less nephrotoxic  - spot EEG 12/3 - moderate generalized slowing  - f/u 24 hr EEG (12/4)  - Valproic Acid to 750mg Q6    ================= Cardiovascular==========================  Sinus bradycardia  - EKG sinus bradycardia, old RBBB  - TTE 12/1 - LVEF 60-65%  - trops neg  - Given normal EF, no changes in EKG, cardiogenic shock less likely    Hypotension  - off pressers      ================- Pulm=================================  COPD  - Patient with hx of COPD not in exacerbation   - Chest X ray Showed bilateral patchy infiltrates, no concern for fluid overload    - CXR 12/7 showed severe R Atelectasis, likely due to mucous plugging, chest PT, R side elevated  -CXR 12/7 3PM showed improvement with chest PT  -CXR 12/8 showed worsening edema, fluids stopped,   - Patient on high flow  - Oxygen supplementation as needed to maintain SpO2 of 88-92%      ==================ID===================================  Shock  - Concern for sepsis vs hypovolemic   - Empirical treatment with CTX for LE cellulitis and for pseudomonal coverage  - S/p 4L NS   - Started on pressors   F/u lactate 1.4, procalcitonin, Bcx, Ucx, CK NGTD  - ID consulted Dr Nolasco     ================= Nephro================================  SHEYLA on CKD  - On admission, patient with Cr 6.4>>5.6, baseline 3.7; stable at baseline today  - Worsening CKD most likely a combination of obstructive vs pre- renal   - Viera catheter placed in ED with 1000 cc of UO  - -58mL in the past 24 hours  - Avoid nephrotoxins, NSAIDS, ACEI and ARBS  - Monitor BMP daily  - Nephro Dr Valenzuela consulted  - S/p Dialysis 12/2 and 12/3, Creatnine near baseline, mental status improved  Dialysis held today 12/8    HAGMA  - Most likely from uremia   - Nephro Dr Valenzuela consulted  - Hold dialysis today as BUN/Cr near baseline    Hypernatremia  Held D5 IV fluids in the setting of worsening edema,   Give 200ml Q6 free water via NGT    =================GI====================================  NGT in place  Start tube feeds  Resume psych PO medications- Wellbutrin  Swallow evaluation  Bowel Regimen Give tonight,  Possible disimpaction tomorrow    ================ Heme==================================  Anemia   - Anemia of chronic disease   - Monitor CBC daily   - Hgb Stable no signs of active bleed    =================Endocrine===============================  Hypothyroidism  - F/u TSH Normal   - C/w home meds when not NPO     ================= Skin/Catheters============================  Peripheral IV lines   Viera catheter    Sacral and Posterior Thigh Ulcers, Wound Care Consulted    =================Prophylaxis =============================  DVT prophylaxis with Heparin SQ daily   GI prophylaxis with PPI daily    ==================GOC==================================  FULL CODE    62 year old morbid obese male, from [facility], has past medical hx of ambulatory dysfunction, ACD, bipolar disorder, BPH, CKD, HLD, PVD, COPD not on oxygen, hypothyroidism, RBBB, lymphedema, CHF (last echo 2/22 normal EF, GIDD) was BIBEMS for hypotension 73/37 mmHg and bradycardia (40's). Patient admitted to ICU for shock and acute encephalopathy.     DX:  - Shock septic vs hypovolemia  - LE cellulitis   - Acute encephalopathy   - Sinus bradycardia  - HAGMA   - SHEYLA on CKD  - Anemia   - COPD not on oxygen at home   - HLD   - BPH  - Hypothyroidism   - Mood disorder       =================== Neuro============================  #Acute Encephalopathy; likely in the setting of acidemia, infection, uremia    Lethargic, fighting vent    Pain meds with Tylenol   CT Head negative for acute bleed/swelling  Awake and alert X2     #Seizure  - Home med includes valproic acid  - Patient is having repeated muscle spasms, improved on Versed but not fully stopped  - Still having muscle movements after dialysis which brought down BUN  - Started propofol instead of precedex  - Valproic acid level low, likely due to missed doses when patient was originally admitted.  - DC keppra per neuro recs, prefer valproate as less nephrotoxic  - spot EEG 12/3 - moderate generalized slowing  - f/u 24 hr EEG (12/4)  - Valproic Acid to 750mg Q6    ================= Cardiovascular==========================  Sinus bradycardia  - EKG sinus bradycardia, old RBBB  - TTE 12/1 - LVEF 60-65%  - trops neg  - Given normal EF, no changes in EKG, cardiogenic shock less likely    Hypotension  - off pressers      ================- Pulm=================================  COPD  - Patient with hx of COPD not in exacerbation   - Chest X ray Showed bilateral patchy infiltrates, no concern for fluid overload    - CXR 12/7 showed severe R Atelectasis, likely due to mucous plugging, chest PT, R side elevated  -CXR 12/7 3PM showed improvement with chest PT  -CXR 12/8 showed worsening edema, fluids stopped,   - Patient on high flow  - Oxygen supplementation as needed to maintain SpO2 of 88-92%      ==================ID===================================  Shock  - Concern for sepsis vs hypovolemic   - Empirical treatment with CTX for LE cellulitis and for pseudomonal coverage  - S/p 4L NS   - Started on pressors   F/u lactate 1.4, procalcitonin, Bcx, Ucx, CK NGTD  - ID consulted Dr Nolasco     ================= Nephro================================  SHEYLA on CKD  - On admission, patient with Cr 6.4>>5.6, baseline 3.7; stable at baseline today  - Worsening CKD most likely a combination of obstructive vs pre- renal   - Viera catheter placed in ED with 1000 cc of UO  - -58mL in the past 24 hours  - Avoid nephrotoxins, NSAIDS, ACEI and ARBS  - Monitor BMP daily  - Nephro Dr Valenzuela consulted  - S/p Dialysis 12/2 and 12/3, Creatnine near baseline, mental status improved  - Patient's renal function returned to baseline  [ ] Okay to remove Shiley    HAGMA  - Most likely from uremia   - Nephro Dr Valenzuela consulted  - Resolved    Hypernatremia  Likely hypervolemic hypernatremia, given fluid overload, and patient's refractory Na secondary to fluid.  [ ] Metolazone trial 5mg to induce Na excretion      =================GI====================================  NGT in place  Start tube feeds  Resume psych PO medications- Wellbutrin  Swallow evaluation  Bowel Regimen Give tonight,  Possible disimpaction tomorrow    ================ Heme==================================  Anemia   - Anemia of chronic disease   - Monitor CBC daily   - Hgb Stable no signs of active bleed    =================Endocrine===============================  Hypothyroidism  - F/u TSH Normal   - C/w home meds when not NPO     ================= Skin/Catheters============================  Peripheral IV lines   Viera catheter    Sacral and Posterior Thigh Ulcers, Wound Care Consulted    =================Prophylaxis =============================  DVT prophylaxis with Heparin SQ daily   GI prophylaxis with PPI daily    ==================GOC==================================  FULL CODE    Abdominal Pain, N/V/D

## 2023-08-22 NOTE — CONSULT NOTE ADULT - NSPROBSELRECBLANK_5_GEN
Name: Cass Sultana      : 1991      MRN: [id number]  Encounter Provider: Lauren Ferraro DO  Encounter Date: 2023   Encounter department: [facility]     1. Umbilical hernia without obstruction and without gangrene  Ambulatory Referral to General Surgery      2. Weight gain          This has been there for ~ 1 year. Acutely worsened yesterday with sneezing and now with burning pain around the umbilicus and distal.  Exacerbated by his weight gain and he recognizes this. No w/o incarceration. Not able to fully reduce here due to pain. He agrees to see Gen Sx for next best steps. Return for keep f/u appt . Patient/Caretaker verbalized understanding and were in agreement with today's assessment and plan. Time was taken to address any questions patient/caretaker had. Chief Complaint   Patient presents with   • Abdominal Pain     Patient states he has had a bulge right above his belly button for a couple months now but started yesterday with pain after having a sneezing fit. Notes that it was bulging out more yesterday. Subjective     There is umbilical bulge with burning. No changes with BMs -- no pain with BMs. When he sneezes, there is increasing pressure/pain - this is what acutely exacerbated things for him, yesterday. Had a "sneezing fit," experienced burning and now with ongoing issues. There is also pain distal and to the L of this. No skin color changes. No f/c/s. He has gained weight over the past year as well and recognizes this is not helping things. Review of Systems   All other systems reviewed and are negative.       Current Outpatient Medications on File Prior to Visit   Medication Sig   • loratadine (CLARITIN) 10 mg tablet Take 10 mg by mouth daily   • [DISCONTINUED] benzonatate (TESSALON PERLES) 100 mg capsule Take 1 capsule (100 mg total) by mouth every 8 (eight) hours (Patient not taking: Reported
on 8/26/2022)   • [DISCONTINUED] naproxen (NAPROSYN) 375 mg tablet Take 1 tablet (375 mg total) by mouth 2 (two) times a day with meals (Patient not taking: Reported on 8/26/2022)   • [DISCONTINUED] ondansetron (ZOFRAN) 4 mg tablet Take 1 tablet (4 mg total) by mouth every 8 (eight) hours as needed for nausea or vomiting       Objective     /92 (BP Location: Left arm, Patient Position: Sitting, Cuff Size: Standard)   Pulse 60   Temp (!) 97.3 °F (36.3 °C) (Temporal)   Resp 12   Wt 83.9 kg (185 lb)   SpO2 97%   BMI 28.13 kg/m²     Physical Exam  Vitals and nursing note reviewed. Constitutional:       General: He is not in acute distress. Appearance: He is well-developed. HENT:      Head: Normocephalic and atraumatic. Eyes:      Conjunctiva/sclera: Conjunctivae normal.   Cardiovascular:      Rate and Rhythm: Normal rate and regular rhythm. Heart sounds: Normal heart sounds. Pulmonary:      Effort: Pulmonary effort is normal.      Breath sounds: Normal breath sounds. No wheezing, rhonchi or rales. Abdominal:      General: Bowel sounds are normal.      Palpations: Abdomen is soft. Comments: Soft, there is ttp over the umbilicus where there is hernia. Not able to fully reduce 2/2 pain. No overlying skin changes noted  (+) BS    Musculoskeletal:         General: No swelling. Cervical back: Neck supple. Lymphadenopathy:      Cervical: No cervical adenopathy. Skin:     General: Skin is warm and dry. Neurological:      General: No focal deficit present. Mental Status: He is alert and oriented to person, place, and time. Psychiatric:         Mood and Affect: Mood normal.         Behavior: Behavior normal.         Thought Content:  Thought content normal.         Judgment: Judgment normal.       Rani Lane DO
DISPLAY PLAN FREE TEXT
DISPLAY PLAN FREE TEXT

## 2023-11-08 NOTE — PATIENT PROFILE ADULT - FUNCTIONAL ASSESSMENT - BASIC MOBILITY 3.
Message left on answering machine.  Pacemaker alert shows 11 to 20% burden of atrial fibrillation putting pt at high risk of stroke.  Dr Quiroga thought risk of bleeding from anticoagulation was prohibitively high and recommended Watchman device with Dr Cortes.  Pt apparently cancelled appt with Dr Cortes  Pt instructed to make another appt with Dr Cortes.   1 = Total assistance

## 2023-12-07 NOTE — ED PROVIDER NOTE - CPE EDP ENMT NORM
Subjective   Patient ID: Nancy Marcus is a 62 y.o. female who presents for No chief complaint on file..    HPI recheck for urinary tract infection    Review of Systems    Objective   There were no vitals taken for this visit.    Physical Exam    Assessment/Plan   Problem List Items Addressed This Visit    None  Visit Diagnoses         Codes    Urinary tract infection without hematuria, site unspecified    -  Primary N39.0    Relevant Orders    Microscopic Only, Urine    Urine Culture               
normal...

## 2024-02-01 NOTE — PROGRESS NOTE ADULT - SUBJECTIVE AND OBJECTIVE BOX
A/p: 85 y/o male with a PMH of HTN, HLD, prostate cancer, dementia, and afib (on eliquis), presented after fall on 1/29, found to have 4-5mm SDH.     PLAN:    -SDH- stable, remains neuro intact, q2 nuero checks   -C/w all home meds Donepezil, Namenda, and Sertraline; and 12.5mg Seroquel for bedtime  -Afib- continue Metoprolol for rate control 12.5 BID   -CHF- c/w lasix and potassium   -High MARCIAL-VASC score however, given ICH will continue to hold AC however, risk vs benefit given frequent falls    - Incentive spirometer for compressive atelectasis    -Regular diet  -Monitor for bowel movement  - Voiding spontaneously, cont lasix   - LE duplex 1/30 negative for DVT  - SQH for dvt ppx   -RLE cellulitis-Continue Cefazolin x 5 days for cellulitis  -2cm scalp laceration, stapled on 1/29      Downgrade level of care, possible discharge      NEPHROLOGY MEDICAL CARE, [facility] - Dr. Taj Valenzuela/ Dr. Hazel Solis/ Dr. Cosmo Root/ Dr. Fang Gifford    Date of Service: 06-20-23 @ 13:46    Patient was seen and examined at bedside.    CC: patient is okay and NAD    Vital Signs Last 24 Hrs  T(C): 36.4 (20 Jun 2023 13:29), Max: 36.9 (19 Jun 2023 18:45)  T(F): 97.5 (20 Jun 2023 13:29), Max: 98.5 (19 Jun 2023 18:45)  HR: 90 (20 Jun 2023 13:29) (85 - 92)  BP: 115/59 (20 Jun 2023 13:29) (91/49 - 130/89)  BP(mean): 95 (20 Jun 2023 01:15) (95 - 97)  RR: 19 (20 Jun 2023 13:29) (16 - 20)  SpO2: 98% (20 Jun 2023 13:29) (97% - 100%)    Parameters below as of 20 Jun 2023 13:29  Patient On (Oxygen Delivery Method): nasal cannula  O2 Flow (L/min): 2      06-19 @ 07:01  -  06-20 @ 07:00  --------------------------------------------------------  IN: 0 mL / OUT: 1000 mL / NET: -1000 mL        PHYSICAL EXAM:  GENERAL: No acute respiratory distress. Anascarca  HEAD:  Atraumatic, Normocephalic  EYES: conjunctiva and sclera clear  ENMT: Moist mucous membranes; NGT  NECK: Supple, No JVD  NERVOUS SYSTEM:  Awake, Alert & Oriented X3  CHEST/LUNG: b/l poor air entry No rales, rhonchi, wheezing  HEART: Regular rate and rhythm; No murmurs, rubs, or gallops  ABDOMEN: Soft, Non-tender, Nondistended; Bowel sounds present  : daley's cath with clear urine.  EXTREMITIES:  3+ pedal edema   SKIN: No rashes or lesions      MEDICATIONS:  MEDICATIONS  (STANDING):  acetylcysteine 20%  Inhalation 4 milliLiter(s) Inhalation every 8 hours  albumin human 25% IVPB 50 milliLiter(s) IV Intermittent every 8 hours  albuterol    90 MICROgram(s) HFA Inhaler 2 Puff(s) Inhalation every 6 hours  buPROPion XL (24-Hour) . 150 milliGRAM(s) Oral daily  dextrose 5%. 1000 milliLiter(s) (50 mL/Hr) IV Continuous <Continuous>  dextrose 5%. 1000 milliLiter(s) (50 mL/Hr) IV Continuous <Continuous>  dorzolamide 2% Ophthalmic Solution 1 Drop(s) Right EYE <User Schedule>  epoetin madyson (PROCRIT) Injectable 75097 Unit(s) SubCutaneous <User Schedule>  finasteride 5 milliGRAM(s) Oral daily  nystatin Powder 1 Application(s) Topical two times a day  pantoprazole  Injectable 40 milliGRAM(s) IV Push every 24 hours  sodium chloride 0.9% for Nebulization 3 milliLiter(s) Nebulizer every 6 hours  sucralfate suspension 1 Gram(s) Oral four times a day  tamsulosin 0.4 milliGRAM(s) Oral at bedtime  traZODone 50 milliGRAM(s) Oral at bedtime  vitamin A &amp; D Ointment 1 Application(s) Topical at bedtime    MEDICATIONS  (PRN):  ondansetron Injectable 4 milliGRAM(s) IV Push every 8 hours PRN Nausea and/or Vomiting  sodium chloride 0.9% lock flush 10 milliLiter(s) IV Push every 1 hour PRN Pre/post blood products, medications, blood draw, and to maintain line patency          LABS:                        8.3    6.50  )-----------( 43       ( 20 Jun 2023 07:08 )             27.3     06-20    145  |  115<H>  |  51<H>  ----------------------------<  85  3.6   |  18<L>  |  5.41<H>    Ca    9.9      20 Jun 2023 07:08  Phos  3.6     06-20  Mg     2.1     06-20    TPro  4.9<L>  /  Alb  2.0<L>  /  TBili  0.7  /  DBili  x   /  AST  5<L>  /  ALT  8<L>  /  AlkPhos  92  06-20    PT/INR - ( 20 Jun 2023 07:08 )   PT: 12.7 sec;   INR: 1.07 ratio         PTT - ( 20 Jun 2023 07:08 )  PTT:37.6 sec    Magnesium, Serum: 2.1 mg/dL (06-20 @ 07:08)  Phosphorus Level, Serum: 3.6 mg/dL (06-20 @ 07:08)    Urine studies  Osmolality, Random Urine: 261 mos/kg (06-18 @ 01:40)  Sodium, Random Urine: 76 mmol/L (06-18 @ 01:40)  Creatinine, Random Urine: 30 mg/dL (06-18 @ 01:40)    PTH and Vit D:

## 2024-02-06 NOTE — ED PROVIDER NOTE - CRITERIA ADMIT PEDS PT
Plan: Isotretinoin therapy should further smooth and reduce the appearance of acne scarring. If any scarring remains at the completion of therapy, we can further discuss the additional treatment options available. Acne scars should not be treated while on isotretinoin therapy or within several months of completion to reduce the risk of inadequate healing and additional scar formation. Detail Level: Zone No

## 2024-02-22 NOTE — PROGRESS NOTE ADULT - PROBLEM SELECTOR PLAN 8
----- Message from Leanne Washington sent at 2/22/2024  3:31 PM CST -----  Contact: Zoraida  Patient is calling to receive a call back at [phone], regarding results. States results were supposed to come back yesterday.      Will attempt TOV in am  Continue finasteride and tamsulosin

## 2024-03-20 NOTE — ED ADULT NURSE NOTE - PRO INTERPRETER NEED 2
CC: Surveillance     HPI: 79-year-old gentleman presents for surveillance colonoscopy secondary to personal history of polyps including tubulovillous polyp in  2018 and family history of colon cancer-father    PMH, PSH, MEDS AND ALLERGIES reviewed and reconciled in  EPIC    PHYSICAL EXAM:  Constitutional:  awake, alert, no acute distress  VS: afebrile, VSS  Respiratory:  normal inspiratory effort  Cardiovascular: regular rate  Gastrointestinal: Soft    ROS:  relevant systems negative other than any presenting complaints    ASSESSMENT:    Surveillance    PLAN:  Colonoscopy    Ross Stearns M.D.   English

## 2024-03-25 NOTE — ED ADULT NURSE NOTE - INTERVENTIONS DEFINITIONS
Note is added to last note from 3/14/24. Did reach out to patient.
Patient Name:Aide   Caller Name:  Name of Facility:  Callback Number:[phone]   Best Availability:  Can a Detailed Message be left? Fax Number:  Additional Information: pt calling because she has a problem with her medication and would like to talk to a nurse as soon as possible  Did you confirm the message with the caller? Patient has been advised of 24-48 hour call back policy?
Stretcher in lowest position, wheels locked, appropriate side rails in place/Call bell, personal items and telephone within reach/Provide visual clues: red socks/[place] to call system

## 2024-07-08 NOTE — H&P ADULT - BIRTH SEX
Detail Level: Zone Otc Regimen: Zyrtec Continue Regimen: Finish Prednisone taper as directed Plan: Patient advised to discontinue topicals.\\nPatient advised to continue taking the Zyrtec\\nPatient advised to finish Prednisone taper.\\n\\nPatient will follow up in 2 weeks. Discontinue Regimen: Triamcinolone 0.1% cream BID x 2 weeks/month as needed on the body, pt to avoid the face\\nHydrocortisone 2.5% cream BID x 2 weeks/month as needed for the face. Male Plan: Patient advised no treatment is needed, this will go away on its own.

## 2024-08-15 NOTE — ED ADULT NURSE NOTE - NSFALLRSKUNASSIST_ED_ALL_ED
What Type Of Note Output Would You Prefer (Optional)?: Bullet Format What Is The Reason For Today's Visit?: Full Body Skin Examination no

## 2024-12-02 NOTE — ED ADULT NURSE NOTE - EXTENSIONS OF SELF_ADULT
HPI:  Unknown age male, unknown past medical history (reported stroke and MI by coworkers) presented to [facility] with AMS, Pt was working at [employer] when he was found down by coworkers. EMS called and pt brought to [facility] ED. Intubated, sedated, started on cardene for SBPs in 200s. CT head showed brain stem bleed. Transferred to NSICU for further management.  (30 Sep 2024 12:55)      Subjective:  GEORGE overnight.    Hospital course:  9/30: transferred from [facility]. A line placed. Versed dc'd. Cy Rader at bedside, states pt has family in [place], cannot confirm medications or PMH other than stroke and MI. 250cc bolus 3% given. LR switched to NS. hydralazine 25q8 started, 3% started, switched propofol to precedex   10/1: stability CTH done. Added labetalol, started TF. Palliative consulted. ethics consulted to determine surrogate. febrile 103, pan cx sent  10/2: BD 2, GEORGE overnight. TF resumed. Desatt'd to 80s, FiO2 inc. to 50. Fentanyl given, ABG, CXR ordered. Maxxed on precedex, started on propofol for DARIEN -4 - -5. Precedex dc'd. Duonebs, mucomyst, hypertonic added. 3% dc'd. Cardene dc'd. Start vanc/CTX. Increased labetalol 200q8. MRSA negative, dc'd vanc. ETT pulled back 2cm x 2, good positioning after confirmatory chest xray. Ethics attempting to establish HCP with family. Na 159, starting FW 250q6 for range 150-155.   10/3: BD3, GEORGE o/n, neuro stable. Na elevating, FW increased to 300q6. Dc'd bowel reg for diarrhea. vEEG started. SQH 5000q8 tonight.   10/4: BD 4, albumn bolus, incr. LR to 80 2/2 incr. in Cr, LR to 100cc/hr for uptrending Cr. Started 7% hypersal for 48hrs and SL atropine for inline/oral thick secretions. Dc'd CTX and started ancef for MSSA in the sputum. Nephrology consulted for CKD, f/u recs. SBP 170s, given hydralazine 10mg IVP.   10/5: BD5, o/n 10mg IVP hydralazine given for SBP 170s and started on hydralazine 25q8 via OGT. 10mg IV push labetalol for SBP > 160s. RT placed for diarrhea.   10/6: BD6, o/n FW increased to 350q4 per nephrology recs. IV tylenol for temp 100.6, SBp 160s presumed uncomfortable.   10/7: BD7, overnight pancultured for temp 101.8F.   10/8: BD8. GEORGE. Cr bumped. decreased LR to 75cc/hr. Adding simethicone ATC. incr hydralazine 50mgTID. Incr labetalol 300mgTID. Na 145, decreased FWF to 250q6. Start precedex. FENa consistent with intrinsic kidney injury. Pend repeat renal US. Retaining up to 1.3L, bladder scans q6, straight cath PRN  10/9: BD 9. GEORGE overnight. Neuro stable. abd xray for distention w non-specific gas pattern, OGT to LIWS for morning. duonebs/mucomyst to q8 for improving secretions. Changed tube feeds to Jevity 1.5 20cc/hr, low rate due to abdominal distention, nepro dense and more difficult to digest. Tolerating CPAP, confirmed by ABG.   10/10: BD 10. GEORGE overnight. Neuro stable. (+) gabriel for urinary retention on bladder scan. inc TF to goal rate of 40cc/hr. family leaning toward pursuing trach/PEG. 1/2 amp for FS 81.   10/11: BD 11. GEORGE overnight. Neuro stable. Trach/PEG consults placed.   10/12: BD 12. GEORGE overnight. Neuro stable. MRI brain complete.   10/13: BD 13. Increase flomax. Hold SQH after PM dose for trach tm. IVL.   10/14: BD 14. GEORGE overnight, remains on AC/VC. Gabriel placed for urinary retention. Dc'd free water.  S/p trach with pulm. NGT placed and CXR confirmed in good position.   10/15: BD 15, GEORGE ovn. resumed feeds. spiked 101, pan cx sent.   10/16: BD 16. GEORGE ovn. Lokelma 5mg for K+ 5.4. Started vanc q 24/zosyn for empiric PNA coverage, IVF to 100/hr. PEG held for fever.   10/17: BD 17,  ordered serum osm and urine osm for am. Started sinemet for neurostimulation. Increased cardura to 0.8. Started FW 100q4, dc'd IVF. MRSA negative, dc'd vanc. NGT replaced d/t coiling.   10/18: BD 18, GEORGE overnight, neuro stable. Amantadine added for neurostim. zosyn changed to unasyn for acinetobacter baumannii, failed TOV and required SC  10/19: BD 19, GEORGE ovn. cardura 2mg added for retention. labetalol decreased 200q8, hydralazine decreased 25q8. Gabriel replaced.   10/20: BD20, GEORGE overnight. NGT dislodged, replaced. PEG tomorrow w/ gen surg, FW increased to 150q4 and labetalol decreased to 100q8, lokelma given for hyperkalemia.   10/21: BD 21. POD0 PEG placement with Gen surg. decr labetolol to 50q8, incr. cardura to 0.4, started lokelma and phoslo, dc gabriel POD0 PEG placement with Gen surg.  10/22: BD 22. Plan to start TF today via PEG. dc labetalol, Following ophtho recs. Increased apnea settings - found to be in cheyne-moe respiration. CPAP 5/5.  10/23: BD 23. hydralazine d/c'd, trach collar trial today. Rectal tube placed at 6am.  10/24: BD24, o/n lokelma held due to diarrhea. Free water 100q6 resumed. dc'd tamsulosin, amantadine. Incr'd cardura to 8mg qhs. Dc'd FW. Switched jevity to nepro. gabriel placed for high urine output. Started SL atropine for oral secretions. Dc'd free water.  10/25: BD25, o/n decreased suctioning requirements to > q4hrs, GEORGE. Cr improving, cont phoslo, lokelma held at this time. Gabriel placed yest, cont. Tolerating trach collar. Given 500cc plasmalyte bolus for ANIKA. Dc'd sinemet.   10/26: BD26, o/n resumed lokelma 5mg daily and resumed 100cc free water q6hrs. Change in neuro status with new right pupillary dilation with anisocoria (right pupil 6mm fixed and left pupil 3mm briskly reactive). Given 23.4% NaCl bullet, taken for emergent CTH showing mostly resolved pontine hemorrhage, continued brainstem hypodensity likely edema d/t hemorrhage, no new hemorrhage or infarct, no herniation, mild increase in size of left lateral ventricle. Vitals remaine stable. Na goal > 140.   10/27: BD27, o/n GEORGE.Neuro stable. Pend stepdown with airway bed.   10/28: BD 28. GEORGE overnight. Neuro stable. Miralax ordered. Gabriel removed, pending TOV.  10/29: BD 29. GEORGE o/n. Given 2L NS over 8 hrs for increased BUN/Cr ratio. Gabriel placed for frequent straight cath.   10/30: BD 30.   10/31: BD 31. GEORGE overnight. Na 149, increased free water to 200q6. 1L NS for uptrending BUN.   11/1: BD 32. GEORGE overnight. Given 1L NS for dehydration. Na 146, increased FW to 250q6.   11/2: BD 33 GEORGE overnight, neuro stable, given 500cc bolus for net negative status and tachycardia   11/3: BD 34, GEORGE overnight, neuro stable. Patient remains tachycardic, EKG showing sinus tachycardia, given additional 500cc NS bolus. Febrile to 101.9F, pan cultured (without UA), CXR WNL, given tylenol.   11/4: BD 35, GEORGE overnight, neuro stable. Given 1L NS for tachycardia. sputum (+) for stenotropohomas maltophilia.   11/5: BD 36 GEORGE overnight, neuro stable. Vancomycin dc'd. Chest PT BID. ID consulted, cont zosyn.  11/6: BD 37. blood cx + klebsiella dc zosyn changed to cefepime, CTAP ordered, rpt blood cx sent.    11/7: BD 38. Pending CT A/P, given 250cc bolus and starting maintenance fluids overnight. Pending CT A/P after bolus   11/8: BD 39. CT CAP negative for infection.   11/9: BD 40. GEORGE overnight.  11/10: BD 41. GEORGE overnight. desat to 85 on trach collar, O2 inc to 10L and 100%, O2 sat inc to 95. pt tachy to 110s, euvolemic. given tylenol. ABG and CXR ordered. spiked fever, pancultured, RVP negative. AM ABG w pO2 79, rpt w pO2 79. pt appears comfortable, satting 94%.   11/11: BD 42. GEORGE overnight. pt became tachy to 130s, desat to 90 on 100% FiO2 and 10L. suctioned, (+) productive cough. temp 101.4, given 1g IV tylenol and 500cc NS bolus for euvolemia. fever and HR downtrending. LE dopplers negative for dvt  11/12: BD 43, GEORGE ovn, fever and HR downtrending, satting 97% 70% FIO2  11/13: BD 44, GEORGE ovn. started standing tylenol x24 hours for tachycardia. desat to 80s, o2 increased. CXR stable, pending CTA PE protocol.   11/14: BD 45, GEORGE overnight, neuro stable. resp therapy dec FiO2 to 70%.   11/15: BD 46, GEORGE overnight, neuro stable.  Rapid called for desaturation 30s, tachycardic 140s. Patient bagged, 100% fio2, heavily suctioned. CXR/POCUS unremarkable. ABG c/w desaturation. WBC 14.71. Afebrile. O2 improved to 90s and patient upgraded to ICU. ABG paO2 30s improved to 89 on vent. IV Tylenol x 1, sputum sent. Start protonix while o-n vent.   11/16: BD 47. POCUS showed collapsable IVCF, given 1L bolus. Vanco/Cefpime added empriic for PNA, NGT feeds restarted. MRSA swab neg, Vanc DC'd.   11/17: BD 48. GEORGE overnight. 1l bolus for tachycardia. Spiked to 101, cultured. 500cc bolus for tachycardia, tachy to 148 given 25mcg fentanyl, 250cc albumin, 1.5L bolus. 5 IV lopressor with response HR to 100s. +Stenotrophomonas on sputum cx.   11/18: BD 49. GEORGE overnight. Consulted ID, cefepime switched to bactrim x7days. Started hydrochlorothiazine 12.5mg daily.  11/19: BD 50. Tachy 120s, given tylenol and 500cc NS. Tolerating 5/5, switched to TCx. Holding phos binder. D/c Bactrim. D/c gabriel, f/u TOV. Dc'd PPI.   11/20: BD 51. GEORGE ovn. 1600 satting low 90s, mildly tachy to 110s, afebrile, RR wnl. O2 improved to mid 90s while inc O2 to 100% on TCx. CPAP 5/5 placed back on.  11/21: BD 52, GEORGE ovn, tolerating CPAP 5/5. Switch to trach collar during the day if tolerating well. HCTZ held for Cr bump, straight cath frequence increased to q4  11/22: BD 53, GEORGE ovn. Resumed phoslo. Gabriel placed. Resumed HCTZ.   11/23: BD 54. Holding tylenol in setting of possible fever, will require pan cx if febrile. Cr improved today. Cont CPAP. Bowel regimen held i/s/o diarrhea. FOBT negative.  11/24: BD 55. GEORGE overnight. Neuro stable. HCTZ dc'ed, started lisinopril 5. Lokelma dc'ed for K 3.7.   11/25: BD 56, GEORGE overnight. Neuro stable. dc'd lisinopril 5mg. Gabriel dc'd. TOV. 1545 noted to be hypotensive, MAP 50, in supine position on chair, HR 60s, afebrile, O2 96%. Given 1L cc NS bolus, placed back on bed in reverse trendelenberg, improved to map of 66. Neostick at bedside. Vitals check q1h. Dc'ed amlodipine. Failed TOV, bladder scan q6, sc prn. Added back Senna.   11/26: BD 57, GEORGE overnight. Neuro stable. Dc'd phoslo.   11/27: BD 58, GEORGE overnight. Neuro stable.    11/28: BD 59. Gabriel replaced.   11/29: GEORGE.  11/30: GEORGE, neuro stable.   12/1: BD 62, GEORGE overnight  12/2: BD 63, GEORGE overnight    Vital Signs Last 24 Hrs  T(C): 36.7 (01 Dec 2024 21:54), Max: 36.7 (01 Dec 2024 09:00)  T(F): 98 (01 Dec 2024 21:54), Max: 98.1 (01 Dec 2024 09:00)  HR: 90 (01 Dec 2024 23:32) (76 - 90)  BP: 130/87 (01 Dec 2024 23:32) (112/81 - 131/85)  BP(mean): 104 (01 Dec 2024 23:32) (93 - 104)  RR: 18 (01 Dec 2024 23:32) (16 - 18)  SpO2: 94% (01 Dec 2024 23:32) (91% - 100%)    Parameters below as of 01 Dec 2024 23:32  Patient On (Oxygen Delivery Method): tracheostomy collar  O2 Flow (L/min): 10  O2 Concentration (%): 40    I&O's Summary    30 Nov 2024 07:01  -  01 Dec 2024 07:00  --------------------------------------------------------  IN: 1550 mL / OUT: 1475 mL / NET: 75 mL    01 Dec 2024 07:01  -  02 Dec 2024 00:17  --------------------------------------------------------  IN: 840 mL / OUT: 750 mL / NET: 90 mL        PHYSICAL EXAM:  General: patient seen laying supine in bed in NAD  Neuro: OEs to voice, A&Ox0, L hand 2/5 and L triceps 3/5 to command, RLE withdraws to noxious, LLE trace withdrawal, LUE 0/5  HEENT: PERRL, only tracks vertically, +trach collar  Neck: supple  Cardiac: RRR, S1S2  Pulmonary: chest rise symmetric  Abdomen: soft, nontender, nondistended, +PEG  Ext: perfusing well  Skin: warm, dry        LABS:                        11.2   7.69  )-----------( 219      ( 01 Dec 2024 05:30 )             33.7     12-01    139  |  103  |  61[H]  ----------------------------<  137[H]  3.9   |  23  |  1.27    Ca    9.3      01 Dec 2024 05:30  Phos  3.1     12-01  Mg     2.1     12-01        Urinalysis Basic - ( 01 Dec 2024 05:30 )    Color: x / Appearance: x / SG: x / pH: x  Gluc: 137 mg/dL / Ketone: x  / Bili: x / Urobili: x   Blood: x / Protein: x / Nitrite: x   Leuk Esterase: x / RBC: x / WBC x   Sq Epi: x / Non Sq Epi: x / Bacteria: x          CAPILLARY BLOOD GLUCOSE          Drug Levels: [] N/A    CSF Analysis: [] N/A      Allergies    Allergy Status Unknown    Intolerances      MEDICATIONS:  Antibiotics:    Neuro:  acetaminophen   Oral Liquid .. 650 milliGRAM(s) Oral every 6 hours PRN    Anticoagulation:  heparin   Injectable 5000 Unit(s) SubCutaneous every 8 hours    OTHER:  acetylcysteine 10%  Inhalation 4 milliLiter(s) Inhalation every 8 hours  albuterol/ipratropium for Nebulization 3 milliLiter(s) Nebulizer every 8 hours  artificial tears (preservative free) Ophthalmic Solution 1 Drop(s) Right EYE every 4 hours  chlorhexidine 2% Cloths 1 Application(s) Topical <User Schedule>  doxazosin 8 milliGRAM(s) Oral at bedtime  petrolatum Ophthalmic Ointment 1 Application(s) Both EYES two times a day  senna 2 Tablet(s) Oral at bedtime    IVF:    CULTURES:  Culture Results:   Mixed gram negative rods including Moderate Stenotrophomonas maltophilia  Commensal iram consistent with body site (11-25 @ 00:19)    RADIOLOGY & ADDITIONAL TESTS:    AMS    Handoff    MEWS Score    Acute myocardial infarction    CVA (cerebral vascular accident)    Intracerebral hemorrhage of brain stem    Brainstem stroke    Brain stem stroke syndrome    Brain stem hemorrhage    Brain stem stroke syndrome    Hemorrhagic stroke    Brainstem stroke    Encephalopathy acute    Functional quadriplegia    Advanced care planning/counseling discussion    Encounter for palliative care    Pontine hemorrhage    Neurogenic dysphagia    Chronic respiratory failure    Acute kidney injury superimposed on CKD    Acute urinary retention    Hypertensive emergency    Sepsis, unspecified organism    Sepsis    Gram-negative bacteremia    Percutaneous tracheal puncture    Altered mental status examination    EGD, with PEG    AMS    [email or web address]        ASSESSMENT:  44 y/o PMH ?stroke/MI present to [facility] after collapsing at work. Decorticate posturing, vomiting, intubated for airway protection. Found to have brainstem hemorrhage (NIHSS 33, ICH score 3). Transferred to [facility] for further management. s/p trach 10/14. s/p peg 10/21. Re-upgrade to ICU 2/2 desaturation event and suctioning requirements 11/15.     PLAN:  Neuro:  - neuro/vitals q4h, protected sleep time 10PM-5AM  - pain control: tylenol prn  - vEEG (10/3-4)- negative, (10/17-10/19) - negative  - CTH 9/30: enlarged pontine hemorrhage, CTH 10/3: stable, CTH 10/25: mostly resolved pontine hemorrhage  - MRI brain 10/12: parenchymal hemorrhage, acute/subacute R cerebellar stroke    - stroke core measures, stroke neuro signed off    CV:  - -160  - HTN: hold amlodipine 10mg, hold lisinopril 5mg   - echo (9/30) EF 75%    Resp:  - trach collar   - Secretions: duonebs/mucomyst/chest PT q8h   - CTA chest 11/13 negative for PE, inc ground glass opacities    GI:  - TF via PEG (placed 10/21 by gen surg)  - bowel regimen, last BM 12/1  - CTAP 11/8 negative for infection    - FOBT negative 11/23    Renal:  - IVL  - FW flushes 250cc q6hrs for uptrending BUN/Cr  - Urinary retenion: Gabriel replaced 11/28  - CKD: trend BUN/Cr  - renal US 10/1: echogenicity c/w chronic med renal dz, repeat 10/8: inc renal echogeicity, c/f medical renal disease w increased hydro     Endo:  - A1c 5.4    Heme:  - DVT ppx: SCDs, SQH 5000u q8h   - LE dopplers negative 11/11    ID:  - febrile, last pancx 11/16, MRSA swab neg 11/16   -  s/p Stenotrophomonas maltophilia PNA: s/p Bactrim (11/18-11/19) s/p Cefepime (11/16-11/18)  - 11/3, (+) sputum for stenotrophomas maltophlia, blood cx (+) klebsiella, cefepime 2gq12 (11/6 - 11/12)   - empiric tx: s/p zosyn (11/3-11/6) , s/p vanc  (11/3- 11/5)  - S/p Ancef (10/4-10/14) for PNA, and s/p Unasyn (10/18-10/23) +actinobacter baumanii    MISC:  - ophtho consult for keratitis, s/p erythromycin ointment to rt eye q4hrs, ofloxacin ointment to rt eye QID, artificial tears to rt eye q2hrs, moisture chamber at bedtime    Dispo: tele status, full code, pending placement and guardianship hearing     D/w Dr. D'Amico     Assessment:  Present when checked    []  GCS  E   V  M     Heart Failure: []Acute, [] acute on chronic , []chronic  Heart Failure:  [] Diastolic (HFpEF), [] Systolic (HFrEF), []Combined (HFpEF and HFrEF), [] RHF, [] Pulm HTN, [] Other    [] ANIKA, [] ATN, [] AIN, [] other  [] CKD1, [] CKD2, [] CKD 3, [] CKD 4, [] CKD 5, []ESRD    Encephalopathy: [] Metabolic, [] Hepatic, [] toxic, [] Neurological, [] Other    Abnormal Nurtitional Status: [] malnurtition (see nutrition note), [ ]underweight: BMI < 19, [] morbid obesity: BMI >40, [] Cachexia    [] Sepsis  [] hypovolemic shock,[] cardiogenic shock, [] hemorrhagic shock, [] neuogenic shock  [] Acute Respiratory Failure  []Cerebral edema, [] Brain compression/ herniation,   [] Functional quadriplegia  [] Acute blood loss anemia   HPI:  Unknown age male, unknown past medical history (reported stroke and MI by coworkers) presented to [facility] with AMS, Pt was working at [employer] when he was found down by coworkers. EMS called and pt brought to [facility] ED. Intubated, sedated, started on cardene for SBPs in 200s. CT head showed brain stem bleed. Transferred to NSICU for further management.  (30 Sep 2024 12:55)      Subjective:  GEORGE overnight.    Hospital course:  9/30: transferred from [facility]. A line placed. Versed dc'd. Cy Rader at bedside, states pt has family in [place], cannot confirm medications or PMH other than stroke and MI. 250cc bolus 3% given. LR switched to NS. hydralazine 25q8 started, 3% started, switched propofol to precedex   10/1: stability CTH done. Added labetalol, started TF. Palliative consulted. ethics consulted to determine surrogate. febrile 103, pan cx sent  10/2: BD 2, GEORGE overnight. TF resumed. Desatt'd to 80s, FiO2 inc. to 50. Fentanyl given, ABG, CXR ordered. Maxxed on precedex, started on propofol for DARIEN -4 - -5. Precedex dc'd. Duonebs, mucomyst, hypertonic added. 3% dc'd. Cardene dc'd. Start vanc/CTX. Increased labetalol 200q8. MRSA negative, dc'd vanc. ETT pulled back 2cm x 2, good positioning after confirmatory chest xray. Ethics attempting to establish HCP with family. Na 159, starting FW 250q6 for range 150-155.   10/3: BD3, GEORGE o/n, neuro stable. Na elevating, FW increased to 300q6. Dc'd bowel reg for diarrhea. vEEG started. SQH 5000q8 tonight.   10/4: BD 4, albumn bolus, incr. LR to 80 2/2 incr. in Cr, LR to 100cc/hr for uptrending Cr. Started 7% hypersal for 48hrs and SL atropine for inline/oral thick secretions. Dc'd CTX and started ancef for MSSA in the sputum. Nephrology consulted for CKD, f/u recs. SBP 170s, given hydralazine 10mg IVP.   10/5: BD5, o/n 10mg IVP hydralazine given for SBP 170s and started on hydralazine 25q8 via OGT. 10mg IV push labetalol for SBP > 160s. RT placed for diarrhea.   10/6: BD6, o/n FW increased to 350q4 per nephrology recs. IV tylenol for temp 100.6, SBp 160s presumed uncomfortable.   10/7: BD7, overnight pancultured for temp 101.8F.   10/8: BD8. GEORGE. Cr bumped. decreased LR to 75cc/hr. Adding simethicone ATC. incr hydralazine 50mgTID. Incr labetalol 300mgTID. Na 145, decreased FWF to 250q6. Start precedex. FENa consistent with intrinsic kidney injury. Pend repeat renal US. Retaining up to 1.3L, bladder scans q6, straight cath PRN  10/9: BD 9. GEORGE overnight. Neuro stable. abd xray for distention w non-specific gas pattern, OGT to LIWS for morning. duonebs/mucomyst to q8 for improving secretions. Changed tube feeds to Jevity 1.5 20cc/hr, low rate due to abdominal distention, nepro dense and more difficult to digest. Tolerating CPAP, confirmed by ABG.   10/10: BD 10. GEORGE overnight. Neuro stable. (+) gabriel for urinary retention on bladder scan. inc TF to goal rate of 40cc/hr. family leaning toward pursuing trach/PEG. 1/2 amp for FS 81.   10/11: BD 11. GEORGE overnight. Neuro stable. Trach/PEG consults placed.   10/12: BD 12. GEORGE overnight. Neuro stable. MRI brain complete.   10/13: BD 13. Increase flomax. Hold SQH after PM dose for trach tm. IVL.   10/14: BD 14. GEORGE overnight, remains on AC/VC. Gabriel placed for urinary retention. Dc'd free water.  S/p trach with pulm. NGT placed and CXR confirmed in good position.   10/15: BD 15, GEORGE ovn. resumed feeds. spiked 101, pan cx sent.   10/16: BD 16. GEOREG ovn. Lokelma 5mg for K+ 5.4. Started vanc q 24/zosyn for empiric PNA coverage, IVF to 100/hr. PEG held for fever.   10/17: BD 17,  ordered serum osm and urine osm for am. Started sinemet for neurostimulation. Increased cardura to 0.8. Started FW 100q4, dc'd IVF. MRSA negative, dc'd vanc. NGT replaced d/t coiling.   10/18: BD 18, GEORGE overnight, neuro stable. Amantadine added for neurostim. zosyn changed to unasyn for acinetobacter baumannii, failed TOV and required SC  10/19: BD 19, GEORGE ovn. cardura 2mg added for retention. labetalol decreased 200q8, hydralazine decreased 25q8. Gabriel replaced.   10/20: BD20, GEORGE overnight. NGT dislodged, replaced. PEG tomorrow w/ gen surg, FW increased to 150q4 and labetalol decreased to 100q8, lokelma given for hyperkalemia.   10/21: BD 21. POD0 PEG placement with Gen surg. decr labetolol to 50q8, incr. cardura to 0.4, started lokelma and phoslo, dc gabriel POD0 PEG placement with Gen surg.  10/22: BD 22. Plan to start TF today via PEG. dc labetalol, Following ophtho recs. Increased apnea settings - found to be in cheyne-moe respiration. CPAP 5/5.  10/23: BD 23. hydralazine d/c'd, trach collar trial today. Rectal tube placed at 6am.  10/24: BD24, o/n lokelma held due to diarrhea. Free water 100q6 resumed. dc'd tamsulosin, amantadine. Incr'd cardura to 8mg qhs. Dc'd FW. Switched jevity to nepro. gabriel placed for high urine output. Started SL atropine for oral secretions. Dc'd free water.  10/25: BD25, o/n decreased suctioning requirements to > q4hrs, GEORGE. Cr improving, cont phoslo, lokelma held at this time. Gabriel placed yest, cont. Tolerating trach collar. Given 500cc plasmalyte bolus for ANIKA. Dc'd sinemet.   10/26: BD26, o/n resumed lokelma 5mg daily and resumed 100cc free water q6hrs. Change in neuro status with new right pupillary dilation with anisocoria (right pupil 6mm fixed and left pupil 3mm briskly reactive). Given 23.4% NaCl bullet, taken for emergent CTH showing mostly resolved pontine hemorrhage, continued brainstem hypodensity likely edema d/t hemorrhage, no new hemorrhage or infarct, no herniation, mild increase in size of left lateral ventricle. Vitals remaine stable. Na goal > 140.   10/27: BD27, o/n GEORGE.Neuro stable. Pend stepdown with airway bed.   10/28: BD 28. GEORGE overnight. Neuro stable. Miralax ordered. Gabriel removed, pending TOV.  10/29: BD 29. GEORGE o/n. Given 2L NS over 8 hrs for increased BUN/Cr ratio. Gabriel placed for frequent straight cath.   10/30: BD 30.   10/31: BD 31. GEORGE overnight. Na 149, increased free water to 200q6. 1L NS for uptrending BUN.   11/1: BD 32. GEORGE overnight. Given 1L NS for dehydration. Na 146, increased FW to 250q6.   11/2: BD 33 GEORGE overnight, neuro stable, given 500cc bolus for net negative status and tachycardia   11/3: BD 34, GEORGE overnight, neuro stable. Patient remains tachycardic, EKG showing sinus tachycardia, given additional 500cc NS bolus. Febrile to 101.9F, pan cultured (without UA), CXR WNL, given tylenol.   11/4: BD 35, GEORGE overnight, neuro stable. Given 1L NS for tachycardia. sputum (+) for stenotropohomas maltophilia.   11/5: BD 36 GEORGE overnight, neuro stable. Vancomycin dc'd. Chest PT BID. ID consulted, cont zosyn.  11/6: BD 37. blood cx + klebsiella dc zosyn changed to cefepime, CTAP ordered, rpt blood cx sent.    11/7: BD 38. Pending CT A/P, given 250cc bolus and starting maintenance fluids overnight. Pending CT A/P after bolus   11/8: BD 39. CT CAP negative for infection.   11/9: BD 40. GEORGE overnight.  11/10: BD 41. GEORGE overnight. desat to 85 on trach collar, O2 inc to 10L and 100%, O2 sat inc to 95. pt tachy to 110s, euvolemic. given tylenol. ABG and CXR ordered. spiked fever, pancultured, RVP negative. AM ABG w pO2 79, rpt w pO2 79. pt appears comfortable, satting 94%.   11/11: BD 42. GEORGE overnight. pt became tachy to 130s, desat to 90 on 100% FiO2 and 10L. suctioned, (+) productive cough. temp 101.4, given 1g IV tylenol and 500cc NS bolus for euvolemia. fever and HR downtrending. LE dopplers negative for dvt  11/12: BD 43, GEORGE ovn, fever and HR downtrending, satting 97% 70% FIO2  11/13: BD 44, GEORGE ovn. started standing tylenol x24 hours for tachycardia. desat to 80s, o2 increased. CXR stable, pending CTA PE protocol.   11/14: BD 45, GEORGE overnight, neuro stable. resp therapy dec FiO2 to 70%.   11/15: BD 46, GEORGE overnight, neuro stable.  Rapid called for desaturation 30s, tachycardic 140s. Patient bagged, 100% fio2, heavily suctioned. CXR/POCUS unremarkable. ABG c/w desaturation. WBC 14.71. Afebrile. O2 improved to 90s and patient upgraded to ICU. ABG paO2 30s improved to 89 on vent. IV Tylenol x 1, sputum sent. Start protonix while o-n vent.   11/16: BD 47. POCUS showed collapsable IVCF, given 1L bolus. Vanco/Cefpime added empriic for PNA, NGT feeds restarted. MRSA swab neg, Vanc DC'd.   11/17: BD 48. GEORGE overnight. 1l bolus for tachycardia. Spiked to 101, cultured. 500cc bolus for tachycardia, tachy to 148 given 25mcg fentanyl, 250cc albumin, 1.5L bolus. 5 IV lopressor with response HR to 100s. +Stenotrophomonas on sputum cx.   11/18: BD 49. GEORGE overnight. Consulted ID, cefepime switched to bactrim x7days. Started hydrochlorothiazine 12.5mg daily.  11/19: BD 50. Tachy 120s, given tylenol and 500cc NS. Tolerating 5/5, switched to TCx. Holding phos binder. D/c Bactrim. D/c gabriel, f/u TOV. Dc'd PPI.   11/20: BD 51. GEORGE ovn. 1600 satting low 90s, mildly tachy to 110s, afebrile, RR wnl. O2 improved to mid 90s while inc O2 to 100% on TCx. CPAP 5/5 placed back on.  11/21: BD 52, GEORGE ovn, tolerating CPAP 5/5. Switch to trach collar during the day if tolerating well. HCTZ held for Cr bump, straight cath frequence increased to q4  11/22: BD 53, GEORGE ovn. Resumed phoslo. Gabriel placed. Resumed HCTZ.   11/23: BD 54. Holding tylenol in setting of possible fever, will require pan cx if febrile. Cr improved today. Cont CPAP. Bowel regimen held i/s/o diarrhea. FOBT negative.  11/24: BD 55. GEORGE overnight. Neuro stable. HCTZ dc'ed, started lisinopril 5. Lokelma dc'ed for K 3.7.   11/25: BD 56, GEORGE overnight. Neuro stable. dc'd lisinopril 5mg. Gabriel dc'd. TOV. 1545 noted to be hypotensive, MAP 50, in supine position on chair, HR 60s, afebrile, O2 96%. Given 1L cc NS bolus, placed back on bed in reverse trendelenberg, improved to map of 66. Neostick at bedside. Vitals check q1h. Dc'ed amlodipine. Failed TOV, bladder scan q6, sc prn. Added back Senna.   11/26: BD 57, GEORGE overnight. Neuro stable. Dc'd phoslo.   11/27: BD 58, GEORGE overnight. Neuro stable.    11/28: BD 59. Gabriel replaced.   11/29: GEORGE.  11/30: GEORGE, neuro stable.   12/1: BD 62, GEORGE overnight  12/2: BD 63, GEORGE overnight    Vital Signs Last 24 Hrs  T(C): 36.7 (01 Dec 2024 21:54), Max: 36.7 (01 Dec 2024 09:00)  T(F): 98 (01 Dec 2024 21:54), Max: 98.1 (01 Dec 2024 09:00)  HR: 90 (01 Dec 2024 23:32) (76 - 90)  BP: 130/87 (01 Dec 2024 23:32) (112/81 - 131/85)  BP(mean): 104 (01 Dec 2024 23:32) (93 - 104)  RR: 18 (01 Dec 2024 23:32) (16 - 18)  SpO2: 94% (01 Dec 2024 23:32) (91% - 100%)    Parameters below as of 01 Dec 2024 23:32  Patient On (Oxygen Delivery Method): tracheostomy collar  O2 Flow (L/min): 10  O2 Concentration (%): 40    I&O's Summary    30 Nov 2024 07:01  -  01 Dec 2024 07:00  --------------------------------------------------------  IN: 1550 mL / OUT: 1475 mL / NET: 75 mL    01 Dec 2024 07:01  -  02 Dec 2024 00:17  --------------------------------------------------------  IN: 840 mL / OUT: 750 mL / NET: 90 mL        PHYSICAL EXAM:  General: patient seen laying supine in bed in NAD  Neuro: OEs to voice, A&Ox0, R hand 2/5 and R triceps 3/5 to command, RLE withdraws to noxious, LLE trace withdrawal, LUE 0/5  HEENT: PERRL, only tracks vertically, +trach collar  Neck: supple  Cardiac: RRR, S1S2  Pulmonary: chest rise symmetric  Abdomen: soft, nontender, nondistended, +PEG  Ext: perfusing well  Skin: warm, dry        LABS:                        11.2   7.69  )-----------( 219      ( 01 Dec 2024 05:30 )             33.7     12-01    139  |  103  |  61[H]  ----------------------------<  137[H]  3.9   |  23  |  1.27    Ca    9.3      01 Dec 2024 05:30  Phos  3.1     12-01  Mg     2.1     12-01        Urinalysis Basic - ( 01 Dec 2024 05:30 )    Color: x / Appearance: x / SG: x / pH: x  Gluc: 137 mg/dL / Ketone: x  / Bili: x / Urobili: x   Blood: x / Protein: x / Nitrite: x   Leuk Esterase: x / RBC: x / WBC x   Sq Epi: x / Non Sq Epi: x / Bacteria: x          CAPILLARY BLOOD GLUCOSE          Drug Levels: [] N/A    CSF Analysis: [] N/A      Allergies    Allergy Status Unknown    Intolerances      MEDICATIONS:  Antibiotics:    Neuro:  acetaminophen   Oral Liquid .. 650 milliGRAM(s) Oral every 6 hours PRN    Anticoagulation:  heparin   Injectable 5000 Unit(s) SubCutaneous every 8 hours    OTHER:  acetylcysteine 10%  Inhalation 4 milliLiter(s) Inhalation every 8 hours  albuterol/ipratropium for Nebulization 3 milliLiter(s) Nebulizer every 8 hours  artificial tears (preservative free) Ophthalmic Solution 1 Drop(s) Right EYE every 4 hours  chlorhexidine 2% Cloths 1 Application(s) Topical <User Schedule>  doxazosin 8 milliGRAM(s) Oral at bedtime  petrolatum Ophthalmic Ointment 1 Application(s) Both EYES two times a day  senna 2 Tablet(s) Oral at bedtime    IVF:    CULTURES:  Culture Results:   Mixed gram negative rods including Moderate Stenotrophomonas maltophilia  Commensal iram consistent with body site (11-25 @ 00:19)    RADIOLOGY & ADDITIONAL TESTS:    AMS    Handoff    MEWS Score    Acute myocardial infarction    CVA (cerebral vascular accident)    Intracerebral hemorrhage of brain stem    Brainstem stroke    Brain stem stroke syndrome    Brain stem hemorrhage    Brain stem stroke syndrome    Hemorrhagic stroke    Brainstem stroke    Encephalopathy acute    Functional quadriplegia    Advanced care planning/counseling discussion    Encounter for palliative care    Pontine hemorrhage    Neurogenic dysphagia    Chronic respiratory failure    Acute kidney injury superimposed on CKD    Acute urinary retention    Hypertensive emergency    Sepsis, unspecified organism    Sepsis    Gram-negative bacteremia    Percutaneous tracheal puncture    Altered mental status examination    EGD, with PEG    AMS    [email or web address]        ASSESSMENT:  46 y/o PMH ?stroke/MI present to [facility] after collapsing at work. Decorticate posturing, vomiting, intubated for airway protection. Found to have brainstem hemorrhage (NIHSS 33, ICH score 3). Transferred to [facility] for further management. s/p trach 10/14. s/p peg 10/21. Re-upgrade to ICU 2/2 desaturation event and suctioning requirements 11/15.     PLAN:  Neuro:  - neuro/vitals q4h, protected sleep time 10PM-5AM  - pain control: tylenol prn  - vEEG (10/3-4)- negative, (10/17-10/19) - negative  - CTH 9/30: enlarged pontine hemorrhage, CTH 10/3: stable, CTH 10/25: mostly resolved pontine hemorrhage  - MRI brain 10/12: parenchymal hemorrhage, acute/subacute R cerebellar stroke    - stroke core measures, stroke neuro signed off    CV:  - -160  - HTN: hold amlodipine 10mg, hold lisinopril 5mg   - echo (9/30) EF 75%    Resp:  - trach collar   - Secretions: duonebs/mucomyst/chest PT q8h   - CTA chest 11/13 negative for PE, inc ground glass opacities    GI:  - TF via PEG (placed 10/21 by gen surg)  - bowel regimen, last BM 12/1  - CTAP 11/8 negative for infection    - FOBT negative 11/23    Renal:  - IVL  - FW flushes 250cc q6hrs for uptrending BUN/Cr  - Urinary retenion: Gabriel replaced 11/28  - CKD: trend BUN/Cr  - renal US 10/1: echogenicity c/w chronic med renal dz, repeat 10/8: inc renal echogeicity, c/f medical renal disease w increased hydro     Endo:  - A1c 5.4    Heme:  - DVT ppx: SCDs, SQH 5000u q8h   - LE dopplers negative 11/11    ID:  - febrile, last pancx 11/16, MRSA swab neg 11/16   -  s/p Stenotrophomonas maltophilia PNA: s/p Bactrim (11/18-11/19) s/p Cefepime (11/16-11/18)  - 11/3, (+) sputum for stenotrophomas maltophlia, blood cx (+) klebsiella, cefepime 2gq12 (11/6 - 11/12)   - empiric tx: s/p zosyn (11/3-11/6) , s/p vanc  (11/3- 11/5)  - S/p Ancef (10/4-10/14) for PNA, and s/p Unasyn (10/18-10/23) +actinobacter baumanii    MISC:  - ophtho consult for keratitis, s/p erythromycin ointment to rt eye q4hrs, ofloxacin ointment to rt eye QID, artificial tears to rt eye q2hrs, moisture chamber at bedtime    Dispo: tele status, full code, pending placement and guardianship hearing     D/w Dr. D'Amico     Assessment:  Present when checked    []  GCS  E   V  M     Heart Failure: []Acute, [] acute on chronic , []chronic  Heart Failure:  [] Diastolic (HFpEF), [] Systolic (HFrEF), []Combined (HFpEF and HFrEF), [] RHF, [] Pulm HTN, [] Other    [] ANIKA, [] ATN, [] AIN, [] other  [] CKD1, [] CKD2, [] CKD 3, [] CKD 4, [] CKD 5, []ESRD    Encephalopathy: [] Metabolic, [] Hepatic, [] toxic, [] Neurological, [] Other    Abnormal Nurtitional Status: [] malnurtition (see nutrition note), [ ]underweight: BMI < 19, [] morbid obesity: BMI >40, [] Cachexia    [] Sepsis  [] hypovolemic shock,[] cardiogenic shock, [] hemorrhagic shock, [] neuogenic shock  [] Acute Respiratory Failure  []Cerebral edema, [] Brain compression/ herniation,   [] Functional quadriplegia  [] Acute blood loss anemia   None

## 2025-03-14 NOTE — PATIENT PROFILE ADULT - NSPROPTRIGHTBILLOFRIGHTS_GEN_A_NUR
"  Daily Progress Note.   [facility] OBSERVATION  3/14/2025    Patient:  Name:  Katharina Torres  MRN:  [id number]  1963  61 y.o.  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 Current ongoing tobacco use.    Interval History:  Some cough sputum no hemoptysis  Sig sinus headache congestion drainage  No lethargy, awake alert       Physical Exam:  /66 (BP Location: Right arm, Patient Position: Lying)   Pulse 83   Temp 98.6 °F (37 °C) (Oral)   Resp 18   Ht 162.6 cm (64\")   Wt 83.9 kg (185 lb)   SpO2 92%   BMI 31.76 kg/m²   Body mass index is 31.76 kg/m².  No intake or output data in the 24 hours ending 03/14/25 1502  General appearance: ill but non toxic, conversant   Eyes: anicteric sclerae, moist conjunctivae; no lidlag;    HENT: Atraumatic; oropharynx clear with moist mucous membranes    Neck: Trachea midline;  supple   Lungs: No expiratory wheeze no sig rhonchi, with normal respiratory effort and no intercostal retractions  CV: RRR, no rub   Abdomen: Soft, nontender; BS+  Extremities: No peripheral edema or ext lad  Skin: WWP no diffuse visible rash  Psych: Appropriate affect, alert   Neuro: Moves all ext. CN II-XII. Speech intact.    Data Review:  Notable Labs:  Results from last 7 days   Lab Units 03/13/25  0348 03/12/25  1654   WBC 10*3/mm3 8.52 7.29   HEMOGLOBIN g/dL 14.1 14.4   PLATELETS 10*3/mm3 182 172     Results from last 7 days   Lab Units 03/13/25  0348 03/12/25  1654   SODIUM mmol/L 142 141   POTASSIUM mmol/L 4.2 3.2*   CHLORIDE mmol/L 105 101   CO2 mmol/L 24.7 25.3   BUN mg/dL 5* 6* "   CREATININE mg/dL 0.45* 0.62   GLUCOSE mg/dL 123* 91   CALCIUM mg/dL 8.8 9.0   Estimated Creatinine Clearance: 135.9 mL/min (A) (by C-G formula based on SCr of 0.45 mg/dL (L)).    Results from last 7 days   Lab Units 03/13/25  0348 03/12/25  1654   AST (SGOT) U/L  --  26   ALT (SGPT) U/L  --  20   LACTATE mmol/L  --  1.2   D DIMER QUANT MCGFEU/mL  --  1.13*   PLATELETS 10*3/mm3 182 172       Results from last 7 days   Lab Units 03/12/25  2035   PH, ARTERIAL pH units 7.483*   PCO2, ARTERIAL mm Hg 35.4   PO2 ART mm Hg 54.9*   HCO3 ART mmol/L 26.5       Imaging:  Reviewed chest images personally from past 3 days    A/P:  Influenza A  COPD without acute exacerbation  Acute hypoxemia  Hypokalemia  Ongoing tobacco abuse  Hypothyroidism  Bipolar disorder  History of lung cancer, currently in remission        Admitted to observation for management of COPD exacerbation/ influenza A.  Tamiflu      supportive care.  Continue supplemental oxygen as needed to maintain SpO2 >90%.  No active wheezing on exam- continue scheduled bronchodilators.     Sinus congestion 3 days afrin  Saline nasal spray    Okay to give steroids if worsening wheezing or chest tightness it is okay to treat with steroids in patients with exacerbations of COPD secondary to flu however right now no wheeze    Tobacco cessation education provided- nicotine patch and PRN nicotine gum ordered.     Significant calf pain sent for duplex venous lower extremity for completeness  Discussed with bedside nursing     Electronically signed by Donald East MD, 03/14/25, 3:20 PM EDT.         patient

## 2025-06-21 NOTE — ED PROVIDER NOTE - CHPI ED SYMPTOMS POS
Advance Care Planning   Healthcare Decision Maker:    Primary Decision Maker: Fermin Bailon - Child - [phone]    Secondary Decision Maker: Estelle Peace - Child - [phone]    Click here to complete Healthcare Decision Makers including selection of the Healthcare Decision Maker Relationship (ie \"Primary\").             
sweaty, shaky/SHORTNESS OF BREATH

## (undated) DEVICE — SOL INJ NS 0.9% 500ML 1-PORT

## (undated) DEVICE — CONTAINER FORMALIN 10% 20ML

## (undated) DEVICE — MASK OXYGEN PANORAMIC

## (undated) DEVICE — BITE BLOCK ADULT 20 X 27MM (GREEN)

## (undated) DEVICE — SOLIDIFIER ISOLYZER 2000 CC

## (undated) DEVICE — BIOPSY FORCEP RADIAL JAW 4 STANDARD WITH NEEDLE

## (undated) DEVICE — BITE BLOCK MOUTHPCW/STRAP

## (undated) DEVICE — TUBING CANNULA SALTER LABS NASAL ADULT 7FT

## (undated) DEVICE — KIT ENDO PROCEDURE CUST W/VLV

## (undated) DEVICE — Device

## (undated) DEVICE — VENODYNE/SCD SLEEVE CALF MEDIUM